# Patient Record
Sex: FEMALE | Race: WHITE | NOT HISPANIC OR LATINO | URBAN - METROPOLITAN AREA
[De-identification: names, ages, dates, MRNs, and addresses within clinical notes are randomized per-mention and may not be internally consistent; named-entity substitution may affect disease eponyms.]

---

## 2021-03-24 ENCOUNTER — INPATIENT (INPATIENT)
Facility: HOSPITAL | Age: 81
LOS: 1 days | Discharge: HOME | DRG: 251 | End: 2021-03-26
Attending: INTERNAL MEDICINE | Admitting: INTERNAL MEDICINE
Payer: MEDICARE

## 2021-03-24 VITALS
HEIGHT: 65.5 IN | RESPIRATION RATE: 18 BRPM | DIASTOLIC BLOOD PRESSURE: 82 MMHG | OXYGEN SATURATION: 100 % | HEART RATE: 72 BPM | WEIGHT: 164.91 LBS | SYSTOLIC BLOOD PRESSURE: 173 MMHG | TEMPERATURE: 99 F

## 2021-03-24 DIAGNOSIS — I20.0 UNSTABLE ANGINA: ICD-10-CM

## 2021-03-24 DIAGNOSIS — I73.9 PERIPHERAL VASCULAR DISEASE, UNSPECIFIED: ICD-10-CM

## 2021-03-24 DIAGNOSIS — I10 ESSENTIAL (PRIMARY) HYPERTENSION: ICD-10-CM

## 2021-03-24 DIAGNOSIS — R79.9 ABNORMAL FINDING OF BLOOD CHEMISTRY, UNSPECIFIED: ICD-10-CM

## 2021-03-24 DIAGNOSIS — E78.5 HYPERLIPIDEMIA, UNSPECIFIED: ICD-10-CM

## 2021-03-24 DIAGNOSIS — Z79.899 OTHER LONG TERM (CURRENT) DRUG THERAPY: ICD-10-CM

## 2021-03-24 DIAGNOSIS — I25.10 ATHEROSCLEROTIC HEART DISEASE OF NATIVE CORONARY ARTERY WITHOUT ANGINA PECTORIS: ICD-10-CM

## 2021-03-24 LAB
ALBUMIN SERPL ELPH-MCNC: 4.3 G/DL — SIGNIFICANT CHANGE UP (ref 3.3–5)
ALP SERPL-CCNC: 71 U/L — SIGNIFICANT CHANGE UP (ref 40–120)
ALT FLD-CCNC: 11 U/L — SIGNIFICANT CHANGE UP (ref 10–45)
ANION GAP SERPL CALC-SCNC: 18 MMOL/L — HIGH (ref 5–17)
APTT BLD: 32.2 SEC — SIGNIFICANT CHANGE UP (ref 27.5–35.5)
AST SERPL-CCNC: 19 U/L — SIGNIFICANT CHANGE UP (ref 10–40)
BASOPHILS # BLD AUTO: 0.03 K/UL — SIGNIFICANT CHANGE UP (ref 0–0.2)
BASOPHILS NFR BLD AUTO: 0.4 % — SIGNIFICANT CHANGE UP (ref 0–2)
BILIRUB SERPL-MCNC: 0.4 MG/DL — SIGNIFICANT CHANGE UP (ref 0.2–1.2)
BUN SERPL-MCNC: 37 MG/DL — HIGH (ref 7–23)
CALCIUM SERPL-MCNC: 10 MG/DL — SIGNIFICANT CHANGE UP (ref 8.4–10.5)
CHLORIDE SERPL-SCNC: 104 MMOL/L — SIGNIFICANT CHANGE UP (ref 96–108)
CK MB CFR SERPL CALC: 2.9 NG/ML — SIGNIFICANT CHANGE UP (ref 0–3.8)
CK SERPL-CCNC: 74 U/L — SIGNIFICANT CHANGE UP (ref 25–170)
CO2 SERPL-SCNC: 18 MMOL/L — LOW (ref 22–31)
CREAT SERPL-MCNC: 0.91 MG/DL — SIGNIFICANT CHANGE UP (ref 0.5–1.3)
EOSINOPHIL # BLD AUTO: 0.48 K/UL — SIGNIFICANT CHANGE UP (ref 0–0.5)
EOSINOPHIL NFR BLD AUTO: 5.9 % — SIGNIFICANT CHANGE UP (ref 0–6)
GLUCOSE SERPL-MCNC: 90 MG/DL — SIGNIFICANT CHANGE UP (ref 70–99)
HCT VFR BLD CALC: 38.6 % — SIGNIFICANT CHANGE UP (ref 34.5–45)
HGB BLD-MCNC: 12.4 G/DL — SIGNIFICANT CHANGE UP (ref 11.5–15.5)
IMM GRANULOCYTES NFR BLD AUTO: 0.5 % — SIGNIFICANT CHANGE UP (ref 0–1.5)
INR BLD: 0.99 RATIO — SIGNIFICANT CHANGE UP (ref 0.88–1.16)
LYMPHOCYTES # BLD AUTO: 2.34 K/UL — SIGNIFICANT CHANGE UP (ref 1–3.3)
LYMPHOCYTES # BLD AUTO: 29 % — SIGNIFICANT CHANGE UP (ref 13–44)
MCHC RBC-ENTMCNC: 30.4 PG — SIGNIFICANT CHANGE UP (ref 27–34)
MCHC RBC-ENTMCNC: 32.1 GM/DL — SIGNIFICANT CHANGE UP (ref 32–36)
MCV RBC AUTO: 94.6 FL — SIGNIFICANT CHANGE UP (ref 80–100)
MONOCYTES # BLD AUTO: 0.51 K/UL — SIGNIFICANT CHANGE UP (ref 0–0.9)
MONOCYTES NFR BLD AUTO: 6.3 % — SIGNIFICANT CHANGE UP (ref 2–14)
NEUTROPHILS # BLD AUTO: 4.67 K/UL — SIGNIFICANT CHANGE UP (ref 1.8–7.4)
NEUTROPHILS NFR BLD AUTO: 57.9 % — SIGNIFICANT CHANGE UP (ref 43–77)
NRBC # BLD: 0 /100 WBCS — SIGNIFICANT CHANGE UP (ref 0–0)
PLATELET # BLD AUTO: 330 K/UL — SIGNIFICANT CHANGE UP (ref 150–400)
POTASSIUM SERPL-MCNC: 4.4 MMOL/L — SIGNIFICANT CHANGE UP (ref 3.5–5.3)
POTASSIUM SERPL-SCNC: 4.4 MMOL/L — SIGNIFICANT CHANGE UP (ref 3.5–5.3)
PROT SERPL-MCNC: 7.4 G/DL — SIGNIFICANT CHANGE UP (ref 6–8.3)
PROTHROM AB SERPL-ACNC: 11.9 SEC — SIGNIFICANT CHANGE UP (ref 10.6–13.6)
RBC # BLD: 4.08 M/UL — SIGNIFICANT CHANGE UP (ref 3.8–5.2)
RBC # FLD: 12.7 % — SIGNIFICANT CHANGE UP (ref 10.3–14.5)
SARS-COV-2 RNA SPEC QL NAA+PROBE: SIGNIFICANT CHANGE UP
SODIUM SERPL-SCNC: 140 MMOL/L — SIGNIFICANT CHANGE UP (ref 135–145)
TROPONIN T, HIGH SENSITIVITY RESULT: 20 NG/L — SIGNIFICANT CHANGE UP (ref 0–51)
TROPONIN T, HIGH SENSITIVITY RESULT: 25 NG/L — SIGNIFICANT CHANGE UP (ref 0–51)
WBC # BLD: 8.07 K/UL — SIGNIFICANT CHANGE UP (ref 3.8–10.5)
WBC # FLD AUTO: 8.07 K/UL — SIGNIFICANT CHANGE UP (ref 3.8–10.5)

## 2021-03-24 PROCEDURE — 99285 EMERGENCY DEPT VISIT HI MDM: CPT | Mod: CS,GC

## 2021-03-24 PROCEDURE — 99223 1ST HOSP IP/OBS HIGH 75: CPT

## 2021-03-24 PROCEDURE — 71046 X-RAY EXAM CHEST 2 VIEWS: CPT | Mod: 26

## 2021-03-24 PROCEDURE — 93010 ELECTROCARDIOGRAM REPORT: CPT

## 2021-03-24 RX ORDER — METOPROLOL TARTRATE 50 MG
50 TABLET ORAL
Refills: 0 | Status: DISCONTINUED | OUTPATIENT
Start: 2021-03-24 | End: 2021-03-26

## 2021-03-24 RX ORDER — SODIUM CHLORIDE 9 MG/ML
1000 INJECTION, SOLUTION INTRAVENOUS
Refills: 0 | Status: DISCONTINUED | OUTPATIENT
Start: 2021-03-24 | End: 2021-03-26

## 2021-03-24 RX ORDER — ATORVASTATIN CALCIUM 80 MG/1
40 TABLET, FILM COATED ORAL AT BEDTIME
Refills: 0 | Status: DISCONTINUED | OUTPATIENT
Start: 2021-03-24 | End: 2021-03-26

## 2021-03-24 RX ORDER — SIMVASTATIN 20 MG/1
0 TABLET, FILM COATED ORAL
Qty: 0 | Refills: 0 | DISCHARGE

## 2021-03-24 RX ORDER — INSULIN GLARGINE 100 [IU]/ML
0 INJECTION, SOLUTION SUBCUTANEOUS
Qty: 0 | Refills: 0 | DISCHARGE

## 2021-03-24 RX ORDER — GABAPENTIN 400 MG/1
0 CAPSULE ORAL
Qty: 0 | Refills: 0 | DISCHARGE

## 2021-03-24 RX ORDER — METFORMIN HYDROCHLORIDE 850 MG/1
0 TABLET ORAL
Qty: 0 | Refills: 0 | DISCHARGE

## 2021-03-24 RX ORDER — DEXTROSE 50 % IN WATER 50 %
15 SYRINGE (ML) INTRAVENOUS ONCE
Refills: 0 | Status: DISCONTINUED | OUTPATIENT
Start: 2021-03-24 | End: 2021-03-26

## 2021-03-24 RX ORDER — HYDROCHLOROTHIAZIDE 25 MG
25 TABLET ORAL DAILY
Refills: 0 | Status: DISCONTINUED | OUTPATIENT
Start: 2021-03-24 | End: 2021-03-26

## 2021-03-24 RX ORDER — INSULIN LISPRO 100/ML
VIAL (ML) SUBCUTANEOUS
Refills: 0 | Status: DISCONTINUED | OUTPATIENT
Start: 2021-03-24 | End: 2021-03-26

## 2021-03-24 RX ORDER — CLOPIDOGREL BISULFATE 75 MG/1
75 TABLET, FILM COATED ORAL DAILY
Refills: 0 | Status: DISCONTINUED | OUTPATIENT
Start: 2021-03-24 | End: 2021-03-26

## 2021-03-24 RX ORDER — ASPIRIN/CALCIUM CARB/MAGNESIUM 324 MG
81 TABLET ORAL DAILY
Refills: 0 | Status: DISCONTINUED | OUTPATIENT
Start: 2021-03-24 | End: 2021-03-26

## 2021-03-24 RX ORDER — GLIMEPIRIDE 1 MG
0 TABLET ORAL
Qty: 0 | Refills: 0 | DISCHARGE

## 2021-03-24 RX ORDER — ENOXAPARIN SODIUM 100 MG/ML
40 INJECTION SUBCUTANEOUS DAILY
Refills: 0 | Status: DISCONTINUED | OUTPATIENT
Start: 2021-03-24 | End: 2021-03-26

## 2021-03-24 RX ORDER — GLUCAGON INJECTION, SOLUTION 0.5 MG/.1ML
1 INJECTION, SOLUTION SUBCUTANEOUS ONCE
Refills: 0 | Status: DISCONTINUED | OUTPATIENT
Start: 2021-03-24 | End: 2021-03-26

## 2021-03-24 RX ORDER — INSULIN GLARGINE 100 [IU]/ML
12 INJECTION, SOLUTION SUBCUTANEOUS AT BEDTIME
Refills: 0 | Status: DISCONTINUED | OUTPATIENT
Start: 2021-03-24 | End: 2021-03-26

## 2021-03-24 RX ORDER — DEXTROSE 50 % IN WATER 50 %
25 SYRINGE (ML) INTRAVENOUS ONCE
Refills: 0 | Status: DISCONTINUED | OUTPATIENT
Start: 2021-03-24 | End: 2021-03-26

## 2021-03-24 RX ORDER — LOSARTAN POTASSIUM 100 MG/1
100 TABLET, FILM COATED ORAL DAILY
Refills: 0 | Status: DISCONTINUED | OUTPATIENT
Start: 2021-03-24 | End: 2021-03-26

## 2021-03-24 RX ORDER — LIRAGLUTIDE 6 MG/ML
0 INJECTION SUBCUTANEOUS
Qty: 0 | Refills: 0 | DISCHARGE

## 2021-03-24 RX ADMIN — CLOPIDOGREL BISULFATE 75 MILLIGRAM(S): 75 TABLET, FILM COATED ORAL at 18:13

## 2021-03-24 RX ADMIN — Medication 50 MILLIGRAM(S): at 21:54

## 2021-03-24 RX ADMIN — INSULIN GLARGINE 12 UNIT(S): 100 INJECTION, SOLUTION SUBCUTANEOUS at 23:19

## 2021-03-24 RX ADMIN — ATORVASTATIN CALCIUM 40 MILLIGRAM(S): 80 TABLET, FILM COATED ORAL at 21:53

## 2021-03-24 NOTE — H&P ADULT - NSHPLABSRESULTS_GEN_ALL_CORE
.  LABS:                         12.4   8.07  )-----------( 330      ( 24 Mar 2021 16:07 )             38.6     03-24    140  |  104  |  37<H>  ----------------------------<  90  4.4   |  18<L>  |  0.91    Ca    10.0      24 Mar 2021 16:07    TPro  7.4  /  Alb  4.3  /  TBili  0.4  /  DBili  x   /  AST  19  /  ALT  11  /  AlkPhos  71  03-24    PT/INR - ( 24 Mar 2021 17:38 )   PT: 11.9 sec;   INR: 0.99 ratio         PTT - ( 24 Mar 2021 17:38 )  PTT:32.2 sec          RADIOLOGY, EKG & ADDITIONAL TESTS: Reviewed.

## 2021-03-24 NOTE — ED PROVIDER NOTE - OBJECTIVE STATEMENT
80 F with hx of CAD x 3 stents ( on aspirin/plavix), DM, HTN, PAD presenting for chest pressure on monday. As per patient symptoms started suddenly at rest on monday and was relieved shortly after taking tums. Patient was seen by her cardiologist today who sent her in for concern of EKG changes. Patient denies sob, fever, chills, headache, nausea, emesis, diarrhea, abdominal pain, hematuria/dysuria or lower extremity swelling. Unknown last echo.

## 2021-03-24 NOTE — H&P ADULT - ASSESSMENT
80f hx as above presenting with chest pressure which resolved on monday, possible GERD/dyspepsia, however given cardiac and PAD history, there is concern this may represent angina. Currently CP free. Also noted to have elevated anion gap and BUN

## 2021-03-24 NOTE — ED PROVIDER NOTE - ATTENDING CONTRIBUTION TO CARE
pt is a 79 y/o female with cad, 3 stents, pvd, htn dm hld presents with indigestion on monday saw dr dean today told she has usa and needs a cath, discussed already with dr hoffman, his np is here and spoke with him for admission for cath today.  no cp at this time.ekg with wellens like pattern, cardiac work up, admission.

## 2021-03-24 NOTE — ED PROVIDER NOTE - NS ED ROS FT
Constitutional: No fever or chills  Eyes: No visual changes, eye pain or redness  HEENT: No throat pain, ear pain, nasal pain. No nose bleeding.  CV: (+) chest pain. No lower extremity edema  Resp: No SOB no cough  GI: No abd pain. No nausea or vomiting. No diarrhea. No constipation.   : No dysuria, hematuria.   MSK: No musculoskeletal pain  Skin: No rash  Neuro: No headache. No numbness or tingling. No weakness.

## 2021-03-24 NOTE — H&P ADULT - PROBLEM SELECTOR PLAN 5
unclear etiology. In setting of recent presumed dyspepsia this could signal upper GIB. However patient without any other abd symptoms/bloody stool.  - continue monitoring  - send FOBT

## 2021-03-24 NOTE — H&P ADULT - HISTORY OF PRESENT ILLNESS
80f hx CAD/PAD last cardiac stent 1990's, recent RLE PAD stent on aspirin plavix, HTN, DM, presenting from PCPs office for new EKG changes. Pt states she had an episode of chest pressure on monday that self resolved after taking tums for presumed dyspepsia. No recurrent symptoms, no SOB, cough, no fevers/chills. No further abd symptoms/n/v/d. Went for routine check up today to Dr. Uribe's office, where she had EKG performed that prompted him to send her ED. Unclear what these new EKG changes were. At present, pt reports she is chest pain free, no acute symptoms. Of note, patient reports longstanding emotional stress from the loss of her  and two sons. ROS otherwise negative.    In ED: 98.9, 72, 173/82, 18, 100 RA. Seen by cardiology who recommended heparin gtt.

## 2021-03-24 NOTE — ED ADULT NURSE NOTE - OBJECTIVE STATEMENT
79yo F with hx CAD, PVD, DM, HTN . sent by pts cardiologist for noted EKG changes. pt states on monday she felt indigestion-type chest discomfort, took two tums, and pain subsided. went today to MD to follow up. hx MI x 3 stents placed years ago. pt arrives aaox4, good skin color, denies pain or complaints. no nausea diaphoresis clenching or signs of distress. pt reports compliance with daily meds including asa and plavix. CM placed. EKG is with MD Schwab

## 2021-03-24 NOTE — ED PROVIDER NOTE - PROGRESS NOTE DETAILS
Patients cardiology team noting patient to be taken to cath at this time and would like admission to their service. Will follow recommendations at this time.

## 2021-03-24 NOTE — ED PROVIDER NOTE - PLAN OF CARE
80 F with hx as above presenting for chest pressure on monday. Given hx concern for acs vs unstable angina. No signs of PNA, Pneumothorax or aortic dissection at this time. Plan for labs, EKG, cardiology.

## 2021-03-24 NOTE — ED ADULT NURSE REASSESSMENT NOTE - NS ED NURSE REASSESS COMMENT FT1
no change in pt presentation. remains free of complaints. CM in place. pending transport to possibly cath lab vs. tele floor. per cardiology team, heparin drip on hold at this time, not to be initiated

## 2021-03-24 NOTE — CONSULT NOTE ADULT - ASSESSMENT
a/p    80 F with hx of CAD x 3 stents 1997/1998 ( on aspirin/plavix), DM, HTN, PVD presenting for chest pressure on Monday.     1. Unstable Angina/ CAD x 3 stents   -symptoms resolved  -pending labs, Hema, CXR   -EKG noted with biphasic twave V1, slight ST elevation V2  -start hep gtt  -resume outpt asa, Plavix, Zocor 40 mg     2. HTN  -bp elevated  -resume outpt bp meds: Losartan 100 mg, Lopressor 50mg BID    dvt ppx     d/w ED a/p    80 F with hx of CAD x 3 stents 1997/1998 ( on aspirin/plavix), DM, HTN, PVD presenting for chest pressure on Monday.     1. Unstable Angina/ CAD x 3 stents   -symptoms resolved  -pending labs, Hema, CXR   -EKG noted with biphasic twave V1, slight ST elevation V2  -start hep gtt if hsT elevated  -resume outpt asa, Plavix, Zocor 40 mg   -plan for [facility] tm    2. HTN  -bp elevated  -resume outpt bp meds: Losartan 100 mg, Lopressor 50mg BID    dvt ppx     d/w ED

## 2021-03-24 NOTE — H&P ADULT - NSICDXPASTMEDICALHX_GEN_ALL_CORE_FT
PAST MEDICAL HISTORY:  Coronary artery disease     Hyperlipidemia     Hypertension     Peripheral artery disease

## 2021-03-24 NOTE — ED ADULT NURSE NOTE - CHPI ED NUR SYMPTOMS NEG
LM informing pt that a small supply was called in and that she should plan on scheduling an appt.  Andreina Giraldo     no back pain/no chest pain

## 2021-03-24 NOTE — ED PROVIDER NOTE - PHYSICAL EXAMINATION
· Physical Examination: PHYSICAL EXAM:   · CONSTITUTIONAL:  Appearance: well appearing.  Development: well developed.  Distress: no apparent  · Manner: appropriate for situation.  Mentation: awake, alert, oriented to person, place, time/situation  · Mood: appropriate.  Nourishment: well  · Head Shape: normal cephalic, ATRAUMATIC  · EYES: bilateral normal, no discharge, redness or evidence of any abnormality  · Nose: clear Mouth: normal mucosa  · Throat: uvula midline, no vesicles, no redness, and no oropharyngeal exudate.  · CARDIAC:  CARDIAC RHYTHM: regular  CARDIAC RATE: normal  CARDIAC PEDAL EDEMA: absent  CARDIAC JVD: non-distended bilaterally  · CARDIAC PULSES: normal bilaterally  · RESPIRATORY:  Respiratory Distress: no  Breath Sounds: normal  · Chest Exam: normal, non-tender  · Abdominal Exam: soft, nondistended, nontender  · MUSCULOSKELETAL: Spine appears normal, range of motion is not limited, no muscle or joint tenderness  · NEUROLOGICAL: Alert and oriented, no focal deficits, no motor or sensory deficits.  · SKIN: Skin normal color for race, warm, dry and intact. No evidence of rash.  · PSYCHIATRIC: Alert and oriented to person, place, time/situation. normal mood and affect. no apparent risk to self or others.

## 2021-03-24 NOTE — H&P ADULT - PROBLEM SELECTOR PLAN 1
s/p PCI, last in 1990's, currently asymptomatic. EKG with biphasic T waves and possible V2 ST elevation  - serial EKGs to track dynamic changes  - initial troponin indeterminate, continue trending  - discussed with Dr. Alcaraz- hold off on heparin unless tropes/EKG turn positive or recurrence of chest pain  - continue aspirin plavix, lopressor 50mg po bid  - cardiology following s/p PCI, last in 1990's, currently asymptomatic. EKG with biphasic T waves and possible V2 ST elevation  - serial EKGs to track dynamic changes  - initial troponin indeterminate, continue trending  - discussed with Dr. Alcaraz- hold off on heparin unless tropes/EKG turn positive or recurrence of chest pain  - continue aspirin plavix, lopressor 50mg po tid  - cardiology following- plan for [facility] tomorrow

## 2021-03-24 NOTE — ED ADULT NURSE REASSESSMENT NOTE - NS ED NURSE REASSESS COMMENT FT1
Report received from MONIQUE Romero. Pt AAOx4, NAD, resp nonlabored, skin warm/dry, resting comfortably in bed with call bell at bedside. Pt denies dizziness, chest pain, palpitations, SOB, abd pain, n/v/d, urinary symptoms, fevers, chills, weakness at this time. Pt awaiting bed/dispo. Safety maintained.

## 2021-03-24 NOTE — ED ADULT NURSE REASSESSMENT NOTE - NS ED NURSE REASSESS COMMENT FT1
MD team spoke with pts cardiologist who is recommending possible need to go to cath lab today. pt is aware and ok with possible plan

## 2021-03-24 NOTE — ED PROVIDER NOTE - CARE PLAN
Principal Discharge DX:	Unstable angina   Principal Discharge DX:	Unstable angina  Assessment and plan of treatment:	80 F with hx as above presenting for chest pressure on monday. Given hx concern for acs vs unstable angina. No signs of PNA, Pneumothorax or aortic dissection at this time. Plan for labs, EKG, cardiology.

## 2021-03-24 NOTE — H&P ADULT - PROBLEM SELECTOR PLAN 2
continue lopressor, losartan/hctz with hold parameters continue lopressor, losartan/hctz with hold parameters    #DM- hold oral meds while inpatient  - lantus 12 units qhs, sliding scale for now

## 2021-03-25 ENCOUNTER — TRANSCRIPTION ENCOUNTER (OUTPATIENT)
Age: 81
End: 2021-03-25

## 2021-03-25 LAB
A1C WITH ESTIMATED AVERAGE GLUCOSE RESULT: 7.2 % — HIGH (ref 4–5.6)
CHOLEST SERPL-MCNC: 118 MG/DL — SIGNIFICANT CHANGE UP
COVID-19 SPIKE DOMAIN AB INTERP: POSITIVE
COVID-19 SPIKE DOMAIN ANTIBODY RESULT: >250 U/ML — HIGH
ESTIMATED AVERAGE GLUCOSE: 160 MG/DL — HIGH (ref 68–114)
HDLC SERPL-MCNC: 29 MG/DL — LOW
LIPID PNL WITH DIRECT LDL SERPL: 69 MG/DL — SIGNIFICANT CHANGE UP
NON HDL CHOLESTEROL: 89 MG/DL — SIGNIFICANT CHANGE UP
SARS-COV-2 IGG+IGM SERPL QL IA: >250 U/ML — HIGH
SARS-COV-2 IGG+IGM SERPL QL IA: POSITIVE
TRIGL SERPL-MCNC: 98 MG/DL — SIGNIFICANT CHANGE UP

## 2021-03-25 PROCEDURE — 92920 PRQ TRLUML C ANGIOP 1ART&/BR: CPT | Mod: RC

## 2021-03-25 PROCEDURE — 93454 CORONARY ARTERY ANGIO S&I: CPT | Mod: 26,59

## 2021-03-25 PROCEDURE — 93010 ELECTROCARDIOGRAM REPORT: CPT

## 2021-03-25 RX ADMIN — Medication 25 MILLIGRAM(S): at 06:19

## 2021-03-25 RX ADMIN — CLOPIDOGREL BISULFATE 75 MILLIGRAM(S): 75 TABLET, FILM COATED ORAL at 06:18

## 2021-03-25 RX ADMIN — Medication 50 MILLIGRAM(S): at 06:19

## 2021-03-25 RX ADMIN — Medication 1: at 16:52

## 2021-03-25 RX ADMIN — LOSARTAN POTASSIUM 100 MILLIGRAM(S): 100 TABLET, FILM COATED ORAL at 06:19

## 2021-03-25 RX ADMIN — Medication 81 MILLIGRAM(S): at 06:19

## 2021-03-25 RX ADMIN — Medication 3: at 11:23

## 2021-03-25 RX ADMIN — INSULIN GLARGINE 12 UNIT(S): 100 INJECTION, SOLUTION SUBCUTANEOUS at 21:15

## 2021-03-25 RX ADMIN — ATORVASTATIN CALCIUM 40 MILLIGRAM(S): 80 TABLET, FILM COATED ORAL at 21:16

## 2021-03-25 RX ADMIN — Medication 50 MILLIGRAM(S): at 17:15

## 2021-03-25 NOTE — DISCHARGE NOTE PROVIDER - NSDCCPTREATMENT_GEN_ALL_CORE_FT
PRINCIPAL PROCEDURE  Procedure: Left heart cardiac cath  Findings and Treatment: s/p PCI/POBA to pCX 90% via RFA       PRINCIPAL PROCEDURE  Procedure: Left heart cardiac cath  Findings and Treatment: INDICATIONS: Other chest pain.  HISTORY: 80f hx CAD/PAD last cardiac stent 1990's, recent RLE PAD stent on aspirin plavix, HTN, DM presenting with chest pain, and posible NST. The patient has a history of coronary artery disease. There were no  significant risk factors.  PROCEDURE:  --  Left coronary angiography.  --  Right coronary angiography.  --  Sonosite - Diagnostic.  --  Sheath Exchange for Intervention.  --  Intervention on mid RCA: balloon angioplasty.  CORONARY VESSELS: The coronary circulation is right dominant.  LM:   --  LM: Angiography showed minor luminal irregularities with no flow  limiting lesions.  LAD:   --  LAD: Angiography showed mild atherosclerosis with no flow  limiting lesions.  CX:   --  Circumflex: Angiography showed mild atherosclerosis with no flow limiting lesions.  RCA:   --  Mid RCA: There was a 80 % stenosis.  COMPLICATIONS: There were no complications.  DIAGNOSTIC RECOMMENDATIONS: Successful angioplasty to mRCA severe ISR, with adequate expansion and FILI 3 flow. IVUS revealing multiple stent layers at site of lesion. Recommend brachytherapy in 3-4 weeks by Dr. Green.  Plan relayed to patient and patient's cardiologist.  INTERVENTIONAL RECOMMENDATIONS: Successful angioplasty to mRCA severe ISR,  with adequate expansion and FILI 3 flow. IVUS revealing multiple stent layers at site of lesion. Recommend brachytherapy in 3-4 weeks by Dr. Green. Plan relayed to patient and patient's cardiologist.

## 2021-03-25 NOTE — DISCHARGE NOTE PROVIDER - HOSPITAL COURSE
80f hx CAD/PAD last cardiac stent 1990's, recent RLE PAD stent on aspirin plavix, HTN, DM, presenting from PCPs office for new EKG changes. Pt states she had an episode of chest pressure on monday that self resolved after taking tums for presumed dyspepsia. No recurrent symptoms, no SOB, cough, no fevers/chills. No further abd symptoms/n/v/d. Went for routine check up today to Dr. Uribe's office, where she had EKG performed that prompted him to send her ED. Unclear what these new EKG changes were. At present, pt reports she is chest pain free, no acute symptoms. Of note, patient reports longstanding emotional stress from the loss of her  and two sons. ROS otherwise negative.  3/25 s/p C PCI/POBA to pCx (90%) via RFA, no hematoma, positive pulses. Patient without complaint, hemodynamically stable. Pending discharge tomorrow

## 2021-03-25 NOTE — PROGRESS NOTE ADULT - ASSESSMENT
a/p  80 F with hx of CAD x 3 stents 1997/1998 ( on aspirin/plavix), DM, HTN, PVD presenting for chest pressure on Monday.     1. Unstable Angina/ CAD x 3 stents   -no further cp  -mild dizziness - orthostatics neg  -EKG noted with biphasic twave V1, slight ST elevation V2  -HsT indeterminate, ck, ckmb nl   -c/w asa, plavix, statin, bb   -plan for C tpday   -Risks/benefits/alternatives of cardiac catheterization discussed with patient at length.  All questions and concerns were addressed. Patient agrees to proceed.     2. HTN  -bp improved   -c/w current meds     dvt ppx

## 2021-03-25 NOTE — DISCHARGE NOTE PROVIDER - CARE PROVIDER_API CALL
Leonardo Alcaraz)  Cardiology  [address], [address]  [place] [address]  Phone: [phone]  Fax: [phone]  Established Patient  Follow Up Time: 2 weeks   Kimani Uribe  CARDIOVASCULAR DISEASE  [address], [address]  [place] [address]  Phone: [phone]  Fax: [phone]  Follow Up Time: 2 weeks

## 2021-03-25 NOTE — DISCHARGE NOTE PROVIDER - NSDCFUADDINST_GEN_ALL_CORE_FT
Wound Care:   the day AFTER your procedure remove bandage GENTLY, and clean using  mild soap and gentle warm, water stream, pat dry. leave OPEN to air. YOU MAY SHOWER   DO NOT apply lotions, creams, ointments, powder, perfumes to your incision site  DO NOT SOAK your site for 1 week ( no baths, no pools, no tubs, etc...)  Check  your groin daily. A small amount of bruising, and soreness are normal    ACTIVITY: for 24 hours   - DO NOT DRIVE  - DO NOT make any important decisions or sign legal documents   - DO NOT operate heavy machineries  - you may resume sexual activity in 48 hours, unless otherwise instructed by your cardiologist     If your procedure was done through the WRIST: for the NEXT 3DAYS:  - avoid pushing, pulling, with that affected wrist   - avoid repeated movement of that hand and wrist (eg: typing, hammering)  - DO NOT LIFT anything more than 5 lbs     If your procedure was done through the GROIN: for the NEXT 5 DAYS  - Limit climbing stairs, DO NOT soak in bathtub or pool  - no strenuous activities, pushing, pulling, straining  - Do not lift anything 10lbs or heavier     MEDICATION:   take your medications as explained (see discharge paperwork)   If you received a STENT, you will be taking antiplatelet medications to KEEP YOUR STENT OPEN ( eg: Aspirin, Plavix, Brilinta, Effient, etc).  Take as prescribed DO NOT STOP taking them without consulting with your cardiologist first.     Follow heart healthy diet recommended by your doctor, , if you smoke STOP SMOKING ( may call [phone] for center of tobacco control if you need assistance)     CALL your doctor to make appointment in 2 WEEKS     ***CALL YOUR DOCTOR***  if you experience: fever, chills, body aches, or severe pain, swelling, redness, heat or yellow discharge at incision site  If you experience Bleeding or excruciating pain at the procedural site, swelling ( golf ball size) at your procedural site  If you experience CHEST PAIN  If you experience extremity numbness, tingling, temperature change ( of your procedural site)   If you are unable to reach your doctor, you may contact:   -Cardiology Office at [facility] at [phone] or   - [facility] [phone]  - [facility] [phone]

## 2021-03-25 NOTE — DISCHARGE NOTE PROVIDER - PROVIDER TOKENS
PROVIDER:[TOKEN:[[id number]],FOLLOWUP:[2 weeks],ESTABLISHEDPATIENT:[T]] PROVIDER:[TOKEN:[[id number]],FOLLOWUP:[2 weeks]]

## 2021-03-25 NOTE — PROGRESS NOTE ADULT - SUBJECTIVE AND OBJECTIVE BOX
CARDIOLOGY FOLLOW UP - Dr. Alcaraz    CC: no further cp. no sob.  reports mild dizziness, orthostatics neg       PHYSICAL EXAM:  T(C): 36.7 (03-25-21 @ 09:25), Max: 37.2 (03-24-21 @ 15:18)  HR: 63 (03-25-21 @ 09:25) (63 - 78)  BP: 139/49 (03-25-21 @ 09:25) (104/60 - 173/82)  RR: 16 (03-25-21 @ 09:25) (14 - 19)  SpO2: 100% (03-25-21 @ 09:25) (96% - 100%)  Wt(kg): --  I&O's Summary    25 Mar 2021 07:01  -  25 Mar 2021 10:48  --------------------------------------------------------  IN: 0 mL / OUT: 0 mL / NET: 0 mL        Appearance: Normal	  Cardiovascular: Normal S1 S2,RRR, No JVD, No murmurs  Respiratory: Lungs clear to auscultation	  Gastrointestinal:  Soft, Non-tender, + BS	  Extremities: Normal range of motion, No clubbing, cyanosis or edema      Home Medications:  aspirin 81 mg oral tablet: 1 tab(s) orally once a day (24 Mar 2021 18:17)  glimepiride 4 mg oral tablet: 1 tab(s) orally once a day (24 Mar 2021 18:17)  losartan-hydrochlorothiazide 100 mg-25 mg oral tablet: 1 tab(s) orally once a day (24 Mar 2021 18:17)  metFORMIN 1000 mg oral tablet: 1 tab(s) orally 2 times a day (24 Mar 2021 18:17)  metoprolol tartrate 50 mg oral tablet: 1 tab(s) orally 3 times a day (24 Mar 2021 18:17)  Plavix 75 mg oral tablet: 1 tab(s) orally once a day (24 Mar 2021 18:17)  simvastatin: 40 milligram(s) orally once a day (24 Mar 2021 18:17)      MEDICATIONS  (STANDING):  aspirin enteric coated 81 milliGRAM(s) Oral daily  atorvastatin 40 milliGRAM(s) Oral at bedtime  clopidogrel Tablet 75 milliGRAM(s) Oral daily  dextrose 40% Gel 15 Gram(s) Oral once  dextrose 5%. 1000 milliLiter(s) (50 mL/Hr) IV Continuous <Continuous>  dextrose 50% Injectable 25 Gram(s) IV Push once  enoxaparin Injectable 40 milliGRAM(s) SubCutaneous daily  glucagon  Injectable 1 milliGRAM(s) IntraMuscular once  hydrochlorothiazide 25 milliGRAM(s) Oral daily  insulin glargine Injectable (LANTUS) 12 Unit(s) SubCutaneous at bedtime  insulin lispro (ADMELOG) corrective regimen sliding scale   SubCutaneous three times a day before meals  losartan 100 milliGRAM(s) Oral daily  metoprolol tartrate 50 milliGRAM(s) Oral two times a day      TELEMETRY: NSR 	    ECG:  	  RADIOLOGY:  Xray Chest 2 Views PA/Lat (03.24.21 @ 17:07)   FINDINGS:    The lungs are clear. No pleural effusion or pneumothorax.  The heart appears normal in size. There is calcification of the aorta.  Status post reverse total right shoulder arthroplasty. Surgical clips are noted in the neck.  The visualized portions of the abdomen are unremarkable.    IMPRESSION:    Clear lungs.  DIAGNOSTIC TESTING:  [ ] Echocardiogram:  [ ]  Catheterization:  [ ] Stress Test:    OTHER: 	    LABS:	 	    Creatine Kinase, Serum: 74 U/L [25 - 170] (03-24 @ 22:00)  CKMB Units: 2.9 ng/mL [0.0 - 3.8] (03-24 @ 22:00)  Troponin T, High Sensitivity Result: 20 ng/L [0 - 51] (03-24 @ 22:00)  Troponin T, High Sensitivity Result: 25 ng/L [0 - 51] (03-24 @ 16:07)                          12.4   8.07  )-----------( 330      ( 24 Mar 2021 16:07 )             38.6     03-24    140  |  104  |  37<H>  ----------------------------<  90  4.4   |  18<L>  |  0.91    Ca    10.0      24 Mar 2021 16:07    TPro  7.4  /  Alb  4.3  /  TBili  0.4  /  DBili  x   /  AST  19  /  ALT  11  /  AlkPhos  71  03-24    PT/INR - ( 24 Mar 2021 17:38 )   PT: 11.9 sec;   INR: 0.99 ratio         PTT - ( 24 Mar 2021 17:38 )  PTT:32.2 sec

## 2021-03-25 NOTE — DISCHARGE NOTE PROVIDER - NSDCMRMEDTOKEN_GEN_ALL_CORE_FT
aspirin 81 mg oral tablet: 1 tab(s) orally once a day  glimepiride 4 mg oral tablet: 1 tab(s) orally once a day  losartan-hydrochlorothiazide 100 mg-25 mg oral tablet: 1 tab(s) orally once a day  metFORMIN 1000 mg oral tablet: 1 tab(s) orally 2 times a day  metoprolol tartrate 50 mg oral tablet: 1 tab(s) orally 3 times a day  Plavix 75 mg oral tablet: 1 tab(s) orally once a day  simvastatin: 40 milligram(s) orally once a day   aspirin 81 mg oral tablet: 1 tab(s) orally once a day  glimepiride 4 mg oral tablet: 1 tab(s) orally once a day  losartan-hydrochlorothiazide 100 mg-25 mg oral tablet: 1 tab(s) orally once a day  metFORMIN 1000 mg oral tablet: 1 tab(s) orally 2 times a day  HOLD  restart on 3/28/2021  metoprolol tartrate 50 mg oral tablet: 1 tab(s) orally 3 times a day  Plavix 75 mg oral tablet: 1 tab(s) orally once a day MDD:1  simvastatin: 40 milligram(s) orally once a day

## 2021-03-26 ENCOUNTER — TRANSCRIPTION ENCOUNTER (OUTPATIENT)
Age: 81
End: 2021-03-26

## 2021-03-26 VITALS
TEMPERATURE: 98 F | RESPIRATION RATE: 16 BRPM | DIASTOLIC BLOOD PRESSURE: 59 MMHG | HEART RATE: 63 BPM | OXYGEN SATURATION: 99 % | SYSTOLIC BLOOD PRESSURE: 111 MMHG

## 2021-03-26 LAB
ANION GAP SERPL CALC-SCNC: 13 MMOL/L — SIGNIFICANT CHANGE UP (ref 5–17)
BASOPHILS # BLD AUTO: 0.04 K/UL — SIGNIFICANT CHANGE UP (ref 0–0.2)
BASOPHILS NFR BLD AUTO: 0.5 % — SIGNIFICANT CHANGE UP (ref 0–2)
BUN SERPL-MCNC: 28 MG/DL — HIGH (ref 7–23)
CALCIUM SERPL-MCNC: 9.6 MG/DL — SIGNIFICANT CHANGE UP (ref 8.4–10.5)
CHLORIDE SERPL-SCNC: 105 MMOL/L — SIGNIFICANT CHANGE UP (ref 96–108)
CO2 SERPL-SCNC: 23 MMOL/L — SIGNIFICANT CHANGE UP (ref 22–31)
CREAT SERPL-MCNC: 1.06 MG/DL — SIGNIFICANT CHANGE UP (ref 0.5–1.3)
EOSINOPHIL # BLD AUTO: 0.37 K/UL — SIGNIFICANT CHANGE UP (ref 0–0.5)
EOSINOPHIL NFR BLD AUTO: 4.3 % — SIGNIFICANT CHANGE UP (ref 0–6)
GLUCOSE SERPL-MCNC: 146 MG/DL — HIGH (ref 70–99)
HCT VFR BLD CALC: 38.2 % — SIGNIFICANT CHANGE UP (ref 34.5–45)
HGB BLD-MCNC: 12.4 G/DL — SIGNIFICANT CHANGE UP (ref 11.5–15.5)
IMM GRANULOCYTES NFR BLD AUTO: 0.2 % — SIGNIFICANT CHANGE UP (ref 0–1.5)
LYMPHOCYTES # BLD AUTO: 2.04 K/UL — SIGNIFICANT CHANGE UP (ref 1–3.3)
LYMPHOCYTES # BLD AUTO: 23.9 % — SIGNIFICANT CHANGE UP (ref 13–44)
MCHC RBC-ENTMCNC: 30.9 PG — SIGNIFICANT CHANGE UP (ref 27–34)
MCHC RBC-ENTMCNC: 32.5 GM/DL — SIGNIFICANT CHANGE UP (ref 32–36)
MCV RBC AUTO: 95.3 FL — SIGNIFICANT CHANGE UP (ref 80–100)
MONOCYTES # BLD AUTO: 0.57 K/UL — SIGNIFICANT CHANGE UP (ref 0–0.9)
MONOCYTES NFR BLD AUTO: 6.7 % — SIGNIFICANT CHANGE UP (ref 2–14)
NEUTROPHILS # BLD AUTO: 5.51 K/UL — SIGNIFICANT CHANGE UP (ref 1.8–7.4)
NEUTROPHILS NFR BLD AUTO: 64.4 % — SIGNIFICANT CHANGE UP (ref 43–77)
NRBC # BLD: 0 /100 WBCS — SIGNIFICANT CHANGE UP (ref 0–0)
PLATELET # BLD AUTO: 324 K/UL — SIGNIFICANT CHANGE UP (ref 150–400)
POTASSIUM SERPL-MCNC: 3.7 MMOL/L — SIGNIFICANT CHANGE UP (ref 3.5–5.3)
POTASSIUM SERPL-SCNC: 3.7 MMOL/L — SIGNIFICANT CHANGE UP (ref 3.5–5.3)
RBC # BLD: 4.01 M/UL — SIGNIFICANT CHANGE UP (ref 3.8–5.2)
RBC # FLD: 12.8 % — SIGNIFICANT CHANGE UP (ref 10.3–14.5)
SODIUM SERPL-SCNC: 141 MMOL/L — SIGNIFICANT CHANGE UP (ref 135–145)
WBC # BLD: 8.55 K/UL — SIGNIFICANT CHANGE UP (ref 3.8–10.5)
WBC # FLD AUTO: 8.55 K/UL — SIGNIFICANT CHANGE UP (ref 3.8–10.5)

## 2021-03-26 PROCEDURE — C1887: CPT

## 2021-03-26 PROCEDURE — C1894: CPT

## 2021-03-26 PROCEDURE — 71046 X-RAY EXAM CHEST 2 VIEWS: CPT

## 2021-03-26 PROCEDURE — 82550 ASSAY OF CK (CPK): CPT

## 2021-03-26 PROCEDURE — 80061 LIPID PANEL: CPT

## 2021-03-26 PROCEDURE — 80048 BASIC METABOLIC PNL TOTAL CA: CPT

## 2021-03-26 PROCEDURE — C1725: CPT

## 2021-03-26 PROCEDURE — 86769 SARS-COV-2 COVID-19 ANTIBODY: CPT

## 2021-03-26 PROCEDURE — 93454 CORONARY ARTERY ANGIO S&I: CPT | Mod: 59

## 2021-03-26 PROCEDURE — 85730 THROMBOPLASTIN TIME PARTIAL: CPT

## 2021-03-26 PROCEDURE — 84484 ASSAY OF TROPONIN QUANT: CPT

## 2021-03-26 PROCEDURE — 85610 PROTHROMBIN TIME: CPT

## 2021-03-26 PROCEDURE — 83036 HEMOGLOBIN GLYCOSYLATED A1C: CPT

## 2021-03-26 PROCEDURE — 82962 GLUCOSE BLOOD TEST: CPT

## 2021-03-26 PROCEDURE — 80053 COMPREHEN METABOLIC PANEL: CPT

## 2021-03-26 PROCEDURE — 99285 EMERGENCY DEPT VISIT HI MDM: CPT

## 2021-03-26 PROCEDURE — 82553 CREATINE MB FRACTION: CPT

## 2021-03-26 PROCEDURE — C1753: CPT

## 2021-03-26 PROCEDURE — C1769: CPT

## 2021-03-26 PROCEDURE — 92920 PRQ TRLUML C ANGIOP 1ART&/BR: CPT | Mod: RC

## 2021-03-26 PROCEDURE — 93005 ELECTROCARDIOGRAM TRACING: CPT

## 2021-03-26 PROCEDURE — 85025 COMPLETE CBC W/AUTO DIFF WBC: CPT

## 2021-03-26 PROCEDURE — U0003: CPT

## 2021-03-26 RX ORDER — CLOPIDOGREL BISULFATE 75 MG/1
1 TABLET, FILM COATED ORAL
Qty: 90 | Refills: 3
Start: 2021-03-26

## 2021-03-26 RX ORDER — POTASSIUM CHLORIDE 20 MEQ
40 PACKET (EA) ORAL DAILY
Refills: 0 | Status: COMPLETED | OUTPATIENT
Start: 2021-03-26 | End: 2021-03-26

## 2021-03-26 RX ORDER — CLOPIDOGREL BISULFATE 75 MG/1
1 TABLET, FILM COATED ORAL
Qty: 0 | Refills: 0 | DISCHARGE

## 2021-03-26 RX ORDER — METFORMIN HYDROCHLORIDE 850 MG/1
1 TABLET ORAL
Qty: 0 | Refills: 0 | DISCHARGE

## 2021-03-26 RX ADMIN — Medication 81 MILLIGRAM(S): at 06:04

## 2021-03-26 RX ADMIN — Medication 1: at 07:48

## 2021-03-26 RX ADMIN — Medication 25 MILLIGRAM(S): at 06:04

## 2021-03-26 RX ADMIN — Medication 50 MILLIGRAM(S): at 06:04

## 2021-03-26 RX ADMIN — Medication 40 MILLIEQUIVALENT(S): at 11:58

## 2021-03-26 RX ADMIN — Medication 2: at 11:58

## 2021-03-26 RX ADMIN — LOSARTAN POTASSIUM 100 MILLIGRAM(S): 100 TABLET, FILM COATED ORAL at 06:04

## 2021-03-26 RX ADMIN — CLOPIDOGREL BISULFATE 75 MILLIGRAM(S): 75 TABLET, FILM COATED ORAL at 06:04

## 2021-03-26 NOTE — PROGRESS NOTE ADULT - ASSESSMENT
[facility] 3/25/21: PCI/POBA to pCX 90%     a/p  80 F with hx of CAD x 3 stents 1997/1998 ( on aspirin/plavix), DM, HTN, PVD presenting for chest pressure on Monday.     1. Unstable Angina/ CAD x 3 stents   -no further cp  -mild dizziness - orthostatics neg  -EKG noted with biphasic twave V1, slight ST elevation V2  -HsT indeterminate, ck, ckmb nl   -[facility] with PCI/POBA to pCX 90%   -plan for brachytherapy in a few weeks   -c/w asa, plavix, statin, bb     2. HTN  -bp stable  -c/w current meds     dvt ppx     DCP pt to f/u w Dr. Uribe upon dc

## 2021-03-26 NOTE — PROGRESS NOTE ADULT - TIME BILLING
Agree with above NP note.  cv stable  s/p cath with POBA to severe RCA instent restenosis  cont dap  d/c planning for tomorrow   will plan for brachytherapy next month
Agree with above NP note.  cv stable  s/p cath with POBA to severe RCA instent restenosis  cont dap  d/c planning   will plan for brachytherapy next month

## 2021-03-26 NOTE — CONSULT NOTE ADULT - ASSESSMENT
80f hx as above presenting with chest pressure which resolved on monday, possible GERD/dyspepsia, however given cardiac and PAD history, there is concern this may represent angina. Currently CP free. Also noted to have elevated anion gap and BUN    Coronary artery disease.      Unstable Angina/ CAD x 3 stents   -no further cp  -mild dizziness - orthostatics neg  -EKG noted with biphasic twave V1, slight ST elevation V2  -HsT indeterminate, ck, ckmb nl   -[facility] with PCI/POBA to pCX 90%   -plan for brachytherapy in a few weeks   -c/w asa, plavix, statin, bb   - cardiology following-      Hypertension.    continue lopressor, losartan/hctz with hold parameters    DM- hold oral meds while inpatient  - lantus 12 units qhs, sliding scale for now.     Peripheral artery disease.     s/p recent RLE stent  - cont aspirin, plavix for now.      Hyperlipidemia.    atorvastatin 40mg po qhs per therapeutic interchange.     Elevated BUN.    unclear etiology. In setting of recent presumed dyspepsia this could signal upper GIB. However patient without any other abd symptoms/bloody stool.  - continue monitoring  - send FOBT.      Medication management.   verified meds with [facility].

## 2021-03-26 NOTE — DISCHARGE NOTE NURSING/CASE MANAGEMENT/SOCIAL WORK - PATIENT PORTAL LINK FT
You can access the FollowMyHealth Patient Portal offered by [facility] by registering at the following website: [email or web address]. By joining [employer]’s FollowMyHealth portal, you will also be able to view your health information using other applications (apps) compatible with our system.

## 2021-03-26 NOTE — CONSULT NOTE ADULT - SUBJECTIVE AND OBJECTIVE BOX
80f hx CAD/PAD last cardiac stent 1990's, recent RLE PAD stent on aspirin plavix, HTN, DM, presenting from PCPs office for new EKG changes. Pt states she had an episode of chest pressure on monday that self resolved after taking tums for presumed dyspepsia. No recurrent symptoms, no SOB, cough, no fevers/chills. No further abd symptoms/n/v/d. Went for routine check up today to Dr. Uribe's office, where she had EKG performed that prompted him to send her ED. Unclear what these new EKG changes were. At present, pt reports she is chest pain free, no acute symptoms. Of note, patient reports longstanding emotional stress from the loss of her  and two sons. ROS otherwise negative.    In ED: 98.9, 72, 173/82, 18, 100 RA. Seen by cardiology who recommended heparin gtt. (24 Mar 2021 17:53)      PAST MEDICAL & SURGICAL HISTORY:  Peripheral artery disease    Hyperlipidemia    Coronary artery disease    Hypertension        Review of Systems:   CONSTITUTIONAL: No fever, weight loss, or fatigue  EYES: No eye pain, visual disturbances, or discharge  ENMT:  No difficulty hearing, tinnitus, vertigo; No sinus or throat pain  NECK: No pain or stiffness  BREASTS: No pain, masses, or nipple discharge  RESPIRATORY: No cough, wheezing, chills or hemoptysis; No shortness of breath  CARDIOVASCULAR: as above  GASTROINTESTINAL: No abdominal or epigastric pain. No nausea, vomiting, or hematemesis; No diarrhea or constipation. No melena or hematochezia.  GENITOURINARY: No dysuria, frequency, hematuria, or incontinence  NEUROLOGICAL: No headaches, memory loss, loss of strength, numbness, or tremors  SKIN: No itching, burning, rashes, or lesions   LYMPH NODES: No enlarged glands  ENDOCRINE: No heat or cold intolerance; No hair loss  MUSCULOSKELETAL: No joint pain or swelling; No muscle, back, or extremity pain  PSYCHIATRIC: No depression, anxiety, mood swings, or difficulty sleeping  HEME/LYMPH: No easy bruising, or bleeding gums  ALLERY AND IMMUNOLOGIC: No hives or eczema    Allergies    No Known Allergies    Intolerances        Social History:     FAMILY HISTORY:      MEDICATIONS  (STANDING):  aspirin enteric coated 81 milliGRAM(s) Oral daily  atorvastatin 40 milliGRAM(s) Oral at bedtime  clopidogrel Tablet 75 milliGRAM(s) Oral daily  dextrose 40% Gel 15 Gram(s) Oral once  dextrose 5%. 1000 milliLiter(s) (50 mL/Hr) IV Continuous <Continuous>  dextrose 50% Injectable 25 Gram(s) IV Push once  enoxaparin Injectable 40 milliGRAM(s) SubCutaneous daily  glucagon  Injectable 1 milliGRAM(s) IntraMuscular once  hydrochlorothiazide 25 milliGRAM(s) Oral daily  insulin glargine Injectable (LANTUS) 12 Unit(s) SubCutaneous at bedtime  insulin lispro (ADMELOG) corrective regimen sliding scale   SubCutaneous three times a day before meals  losartan 100 milliGRAM(s) Oral daily  metoprolol tartrate 50 milliGRAM(s) Oral two times a day    MEDICATIONS  (PRN):      Vital Signs Last 24 Hrs  T(C): 36.9 (26 Mar 2021 09:03), Max: 36.9 (26 Mar 2021 04:54)  T(F): 98.5 (26 Mar 2021 09:03), Max: 98.5 (26 Mar 2021 04:54)  HR: 63 (26 Mar 2021 09:03) (63 - 79)  BP: 111/59 (26 Mar 2021 09:03) (106/47 - 133/68)  BP(mean): --  RR: 16 (26 Mar 2021 09:03) (16 - 18)  SpO2: 99% (26 Mar 2021 09:03) (96% - 99%)  CAPILLARY BLOOD GLUCOSE      POCT Blood Glucose.: 204 mg/dL (26 Mar 2021 11:16)  POCT Blood Glucose.: 164 mg/dL (26 Mar 2021 07:05)  POCT Blood Glucose.: 212 mg/dL (25 Mar 2021 21:13)  POCT Blood Glucose.: 183 mg/dL (25 Mar 2021 16:39)    I&O's Summary    25 Mar 2021 07:01  -  26 Mar 2021 07:00  --------------------------------------------------------  IN: 0 mL / OUT: 0 mL / NET: 0 mL    26 Mar 2021 07:01  -  26 Mar 2021 16:00  --------------------------------------------------------  IN: 480 mL / OUT: 0 mL / NET: 480 mL        PHYSICAL EXAM:  GENERAL: NAD, well-developed  HEAD:  Atraumatic, Normocephalic  EYES: EOMI, PERRLA, conjunctiva and sclera clear  NECK: Supple, No JVD  CHEST/LUNG: Clear to auscultation bilaterally; No wheeze  HEART: Regular rate and rhythm; No murmurs, rubs, or gallops  ABDOMEN: Soft, Nontender, Nondistended; Bowel sounds present  EXTREMITIES:  2+ Peripheral Pulses, No clubbing, cyanosis, or edema  PSYCH: AAOx3  NEUROLOGY: non-focal  SKIN: No rashes or lesions    LABS:                        12.4   8.55  )-----------( 324      ( 26 Mar 2021 01:56 )             38.2     03-26    141  |  105  |  28<H>  ----------------------------<  146<H>  3.7   |  23  |  1.06    Ca    9.6      26 Mar 2021 01:56    TPro  7.4  /  Alb  4.3  /  TBili  0.4  /  DBili  x   /  AST  19  /  ALT  11  /  AlkPhos  71  03-24    PT/INR - ( 24 Mar 2021 17:38 )   PT: 11.9 sec;   INR: 0.99 ratio         PTT - ( 24 Mar 2021 17:38 )  PTT:32.2 sec  CARDIAC MARKERS ( 24 Mar 2021 22:00 )  x     / x     / 74 U/L / x     / 2.9 ng/mL          RADIOLOGY & ADDITIONAL TESTS:    Imaging Personally Reviewed:    Consultant(s) Notes Reviewed:      Care Discussed with Consultants/Other Providers:  
CARDIOLOGY CONSULT - Dr. Alcaraz         HPI:    80 F with hx of CAD x 3 stents 1997/1998 ( on aspirin/plavix), DM, HTN, PVD presenting for chest pressure on Monday.  As per patient symptoms started suddenly at rest on Monday, thought it was indigestion and took Tums with minimal relief.  Reports pain lasted for hours accompanied with mild dizziness and overall malaise.  Symptoms have resolved over the past 24-48 hours.  Patient was seen by her cardiologist ([facility] Dr. Uribe) today who sent her in for concern of EKG changes. Patient denies sob, fever, chills, headache, nausea, emesis, diarrhea, abdominal pain, hematuria/dysuria or lower extremity swelling. Denies any recent cardiac testing.        PAST MEDICAL & SURGICAL HISTORY:          PREVIOUS DIAGNOSTIC TESTING:    [ ] Echocardiogram:  [ ]  Catheterization:  [ ] Stress Test:  	    MEDICATIONS:  Home Medications:      MEDICATIONS  (STANDING):      FAMILY HISTORY:      SOCIAL HISTORY:    [x] Non-smoker  [ ] Smoker  [ ] Alcohol    Allergies    No Known Allergies    Intolerances    	    REVIEW OF SYSTEMS:  CONSTITUTIONAL: No fever, weight loss, or fatigue  EYES: No eye pain, visual disturbances, or discharge  ENMT:  No difficulty hearing, tinnitus, vertigo; No sinus or throat pain  NECK: No pain or stiffness  RESPIRATORY: No cough, wheezing, chills or hemoptysis; No Shortness of Breath  CARDIOVASCULAR: + chest pain,  no palpitations, passing out, dizziness, or leg swelling  GASTROINTESTINAL: No abdominal or epigastric pain. No nausea, vomiting, or hematemesis; No diarrhea or constipation. No melena or hematochezia.  GENITOURINARY: No dysuria, frequency, hematuria, or incontinence  NEUROLOGICAL: No headaches, memory loss, loss of strength, numbness, or tremors  SKIN: No itching, burning, rashes, or lesions   	    [x ] All others negative	see hpi   [ ] Unable to obtain    PHYSICAL EXAM:  T(C): 36.9 (03-24-21 @ 16:32), Max: 37.2 (03-24-21 @ 15:18)  HR: 78 (03-24-21 @ 16:32) (72 - 78)  BP: 165/75 (03-24-21 @ 16:32) (165/75 - 173/82)  RR: 18 (03-24-21 @ 16:32) (18 - 18)  SpO2: 100% (03-24-21 @ 16:32) (100% - 100%)  Wt(kg): --  I&O's Summary      Appearance: Normal	  Psychiatry: A & O x 3, Mood & affect appropriate  HEENT:   Normal oral mucosa, PERRL, EOMI	  Lymphatic: No lymphadenopathy  Cardiovascular: Normal S1 S2,RRR, No JVD, No murmurs  Respiratory: Lungs clear to auscultation	  Gastrointestinal:  Soft, Non-tender, + BS	  Skin: No rashes, No ecchymoses, No cyanosis	  Neurologic: Non-focal  Extremities: Normal range of motion, No clubbing, cyanosis or edema  Vascular: Peripheral pulses palpable 2+ bilaterally    TELEMETRY: NSR 	    ECG:  	NSR 71 - biphasic twave V1, slight ST elevation V2  RADIOLOGY:  OTHER: 	  	  LABS:	 	    CARDIAC MARKERS:                                  12.4   8.07  )-----------( 330      ( 24 Mar 2021 16:07 )             38.6             proBNP:   Lipid Profile:   HgA1c:   TSH:

## 2021-03-26 NOTE — PROGRESS NOTE ADULT - SUBJECTIVE AND OBJECTIVE BOX
CARDIOLOGY FOLLOW UP - Dr. Alcaraz    CC: denies cp, sob, and palpitations       PHYSICAL EXAM:  T(C): 36.9 (03-26-21 @ 09:03), Max: 36.9 (03-26-21 @ 04:54)  HR: 63 (03-26-21 @ 09:03) (63 - 79)  BP: 111/59 (03-26-21 @ 09:03) (98/66 - 133/68)  RR: 16 (03-26-21 @ 09:03) (16 - 18)  SpO2: 99% (03-26-21 @ 09:03) (96% - 99%)  Wt(kg): --  I&O's Summary    25 Mar 2021 07:01  -  26 Mar 2021 07:00  --------------------------------------------------------  IN: 0 mL / OUT: 0 mL / NET: 0 mL    26 Mar 2021 07:01  -  26 Mar 2021 13:57  --------------------------------------------------------  IN: 480 mL / OUT: 0 mL / NET: 480 mL        Appearance: Normal	  Cardiovascular: Normal S1 S2,RRR, No JVD, No murmurs  Respiratory: Lungs clear to auscultation	  Gastrointestinal:  Soft, Non-tender, + BS	  Extremities: Normal range of motion, No clubbing, cyanosis or edema      Home Medications:  aspirin 81 mg oral tablet: 1 tab(s) orally once a day (24 Mar 2021 18:17)  glimepiride 4 mg oral tablet: 1 tab(s) orally once a day (24 Mar 2021 18:17)  losartan-hydrochlorothiazide 100 mg-25 mg oral tablet: 1 tab(s) orally once a day (24 Mar 2021 18:17)  metFORMIN 1000 mg oral tablet: 1 tab(s) orally 2 times a day  HOLD  restart on 3/28/2021 (26 Mar 2021 10:50)  metoprolol tartrate 50 mg oral tablet: 1 tab(s) orally 3 times a day (24 Mar 2021 18:17)  simvastatin: 40 milligram(s) orally once a day (24 Mar 2021 18:17)      MEDICATIONS  (STANDING):  aspirin enteric coated 81 milliGRAM(s) Oral daily  atorvastatin 40 milliGRAM(s) Oral at bedtime  clopidogrel Tablet 75 milliGRAM(s) Oral daily  dextrose 40% Gel 15 Gram(s) Oral once  dextrose 5%. 1000 milliLiter(s) (50 mL/Hr) IV Continuous <Continuous>  dextrose 50% Injectable 25 Gram(s) IV Push once  enoxaparin Injectable 40 milliGRAM(s) SubCutaneous daily  glucagon  Injectable 1 milliGRAM(s) IntraMuscular once  hydrochlorothiazide 25 milliGRAM(s) Oral daily  insulin glargine Injectable (LANTUS) 12 Unit(s) SubCutaneous at bedtime  insulin lispro (ADMELOG) corrective regimen sliding scale   SubCutaneous three times a day before meals  losartan 100 milliGRAM(s) Oral daily  metoprolol tartrate 50 milliGRAM(s) Oral two times a day      TELEMETRY: NSR 	    ECG:  	  RADIOLOGY:   DIAGNOSTIC TESTING:  [ ] Echocardiogram:  [x ]  Catheterization:  Cardiac Cath Lab - Adult (03.25.21 @ 11:34)   CORONARY VESSELS: The coronary circulation is right dominant.  LM:   --  LM: Angiography showed minor luminal irregularities with no flow  limiting lesions.  LAD:   --  LAD: Angiography showed mild atherosclerosis with no flow  limiting lesions.  CX:   --  Circumflex: Angiography showed mild atherosclerosis with no flow  limiting lesions.  RCA:   --  Mid RCA: There was a 80 % stenosis.  COMPLICATIONS: Therewere no complications.  DIAGNOSTIC RECOMMENDATIONS: Successful angioplasty to mRCA severe ISR, with  adequate expansion and FILI 3 flow. IVUS revealing multiple stent layers  at site of lesion. Recommend brachytherapy in 3-4 weeks by Dr. Green.  Plan relayed to patient and patient's cardiologist.  INTERVENTIONAL RECOMMENDATIONS: Successful angioplasty to mRCA severe ISR,  with adequate expansion and FILI 3 flow. IVUS revealing multiple stent  layers at site of lesion. Recommend brachytherapy in 3-4 weeks by Dr. Green. Plan relayed to patient and patient's cardiologist.    < end of copied text >    [ ] Stress Test:    OTHER: 	    LABS:	 	    Creatine Kinase, Serum: 74 U/L [25 - 170] (03-24 @ 22:00)  CKMB Units: 2.9 ng/mL [0.0 - 3.8] (03-24 @ 22:00)  Troponin T, High Sensitivity Result: 20 ng/L [0 - 51] (03-24 @ 22:00)  Troponin T, High Sensitivity Result: 25 ng/L [0 - 51] (03-24 @ 16:07)                          12.4   8.55  )-----------( 324      ( 26 Mar 2021 01:56 )             38.2     03-26    141  |  105  |  28<H>  ----------------------------<  146<H>  3.7   |  23  |  1.06    Ca    9.6      26 Mar 2021 01:56    TPro  7.4  /  Alb  4.3  /  TBili  0.4  /  DBili  x   /  AST  19  /  ALT  11  /  AlkPhos  71  03-24    PT/INR - ( 24 Mar 2021 17:38 )   PT: 11.9 sec;   INR: 0.99 ratio         PTT - ( 24 Mar 2021 17:38 )  PTT:32.2 sec

## 2021-04-09 PROBLEM — I25.10 ATHEROSCLEROTIC HEART DISEASE OF NATIVE CORONARY ARTERY WITHOUT ANGINA PECTORIS: Chronic | Status: ACTIVE | Noted: 2021-03-24

## 2021-04-09 PROBLEM — E78.5 HYPERLIPIDEMIA, UNSPECIFIED: Chronic | Status: ACTIVE | Noted: 2021-03-24

## 2021-04-09 PROBLEM — I10 ESSENTIAL (PRIMARY) HYPERTENSION: Chronic | Status: ACTIVE | Noted: 2021-03-24

## 2021-04-09 PROBLEM — Z00.00 ENCOUNTER FOR PREVENTIVE HEALTH EXAMINATION: Status: ACTIVE | Noted: 2021-04-09

## 2021-04-09 PROBLEM — I73.9 PERIPHERAL VASCULAR DISEASE, UNSPECIFIED: Chronic | Status: ACTIVE | Noted: 2021-03-24

## 2021-04-10 DIAGNOSIS — Z01.818 ENCOUNTER FOR OTHER PREPROCEDURAL EXAMINATION: ICD-10-CM

## 2021-04-11 ENCOUNTER — APPOINTMENT (OUTPATIENT)
Dept: DISASTER EMERGENCY | Facility: CLINIC | Age: 81
End: 2021-04-11

## 2021-04-11 LAB — SARS-COV-2 N GENE NPH QL NAA+PROBE: NOT DETECTED

## 2021-04-12 ENCOUNTER — NON-APPOINTMENT (OUTPATIENT)
Age: 81
End: 2021-04-12

## 2021-04-14 ENCOUNTER — INPATIENT (INPATIENT)
Facility: HOSPITAL | Age: 81
LOS: 0 days | Discharge: HOME | DRG: 251 | End: 2021-04-15
Attending: INTERNAL MEDICINE | Admitting: INTERNAL MEDICINE
Payer: COMMERCIAL

## 2021-04-14 VITALS
SYSTOLIC BLOOD PRESSURE: 148 MMHG | TEMPERATURE: 98 F | OXYGEN SATURATION: 96 % | RESPIRATION RATE: 20 BRPM | WEIGHT: 164.02 LBS | HEIGHT: 65 IN | DIASTOLIC BLOOD PRESSURE: 94 MMHG | HEART RATE: 73 BPM

## 2021-04-14 DIAGNOSIS — I25.10 ATHEROSCLEROTIC HEART DISEASE OF NATIVE CORONARY ARTERY WITHOUT ANGINA PECTORIS: ICD-10-CM

## 2021-04-14 LAB
ANION GAP SERPL CALC-SCNC: 10 MMOL/L — SIGNIFICANT CHANGE UP (ref 5–17)
BUN SERPL-MCNC: 38 MG/DL — HIGH (ref 7–23)
CALCIUM SERPL-MCNC: 10 MG/DL — SIGNIFICANT CHANGE UP (ref 8.4–10.5)
CHLORIDE SERPL-SCNC: 104 MMOL/L — SIGNIFICANT CHANGE UP (ref 96–108)
CO2 SERPL-SCNC: 25 MMOL/L — SIGNIFICANT CHANGE UP (ref 22–31)
CREAT SERPL-MCNC: 1.09 MG/DL — SIGNIFICANT CHANGE UP (ref 0.5–1.3)
GLUCOSE SERPL-MCNC: 111 MG/DL — HIGH (ref 70–99)
HCT VFR BLD CALC: 38.5 % — SIGNIFICANT CHANGE UP (ref 34.5–45)
HGB BLD-MCNC: 12.1 G/DL — SIGNIFICANT CHANGE UP (ref 11.5–15.5)
MCHC RBC-ENTMCNC: 30.4 PG — SIGNIFICANT CHANGE UP (ref 27–34)
MCHC RBC-ENTMCNC: 31.4 GM/DL — LOW (ref 32–36)
MCV RBC AUTO: 96.7 FL — SIGNIFICANT CHANGE UP (ref 80–100)
NRBC # BLD: 0 /100 WBCS — SIGNIFICANT CHANGE UP (ref 0–0)
PLATELET # BLD AUTO: 324 K/UL — SIGNIFICANT CHANGE UP (ref 150–400)
POTASSIUM SERPL-MCNC: 4.7 MMOL/L — SIGNIFICANT CHANGE UP (ref 3.5–5.3)
POTASSIUM SERPL-SCNC: 4.7 MMOL/L — SIGNIFICANT CHANGE UP (ref 3.5–5.3)
RBC # BLD: 3.98 M/UL — SIGNIFICANT CHANGE UP (ref 3.8–5.2)
RBC # FLD: 12.8 % — SIGNIFICANT CHANGE UP (ref 10.3–14.5)
SODIUM SERPL-SCNC: 139 MMOL/L — SIGNIFICANT CHANGE UP (ref 135–145)
WBC # BLD: 8.03 K/UL — SIGNIFICANT CHANGE UP (ref 3.8–10.5)
WBC # FLD AUTO: 8.03 K/UL — SIGNIFICANT CHANGE UP (ref 3.8–10.5)

## 2021-04-14 RX ORDER — METOPROLOL TARTRATE 50 MG
50 TABLET ORAL THREE TIMES A DAY
Refills: 0 | Status: DISCONTINUED | OUTPATIENT
Start: 2021-04-14 | End: 2021-04-15

## 2021-04-14 RX ORDER — ACETAMINOPHEN 500 MG
650 TABLET ORAL ONCE
Refills: 0 | Status: COMPLETED | OUTPATIENT
Start: 2021-04-14 | End: 2021-04-14

## 2021-04-14 RX ORDER — GLIMEPIRIDE 1 MG
1 TABLET ORAL
Qty: 0 | Refills: 0 | DISCHARGE

## 2021-04-14 RX ORDER — LOSARTAN POTASSIUM 100 MG/1
100 TABLET, FILM COATED ORAL DAILY
Refills: 0 | Status: DISCONTINUED | OUTPATIENT
Start: 2021-04-14 | End: 2021-04-15

## 2021-04-14 RX ORDER — CLOPIDOGREL BISULFATE 75 MG/1
75 TABLET, FILM COATED ORAL DAILY
Refills: 0 | Status: DISCONTINUED | OUTPATIENT
Start: 2021-04-14 | End: 2021-04-15

## 2021-04-14 RX ORDER — SODIUM CHLORIDE 9 MG/ML
1000 INJECTION, SOLUTION INTRAVENOUS
Refills: 0 | Status: DISCONTINUED | OUTPATIENT
Start: 2021-04-14 | End: 2021-04-15

## 2021-04-14 RX ORDER — INSULIN LISPRO 100/ML
VIAL (ML) SUBCUTANEOUS AT BEDTIME
Refills: 0 | Status: DISCONTINUED | OUTPATIENT
Start: 2021-04-14 | End: 2021-04-15

## 2021-04-14 RX ORDER — INSULIN LISPRO 100/ML
VIAL (ML) SUBCUTANEOUS
Refills: 0 | Status: DISCONTINUED | OUTPATIENT
Start: 2021-04-14 | End: 2021-04-15

## 2021-04-14 RX ORDER — DEXTROSE 50 % IN WATER 50 %
15 SYRINGE (ML) INTRAVENOUS ONCE
Refills: 0 | Status: DISCONTINUED | OUTPATIENT
Start: 2021-04-14 | End: 2021-04-15

## 2021-04-14 RX ORDER — DEXTROSE 50 % IN WATER 50 %
25 SYRINGE (ML) INTRAVENOUS ONCE
Refills: 0 | Status: DISCONTINUED | OUTPATIENT
Start: 2021-04-14 | End: 2021-04-15

## 2021-04-14 RX ORDER — HYDROCHLOROTHIAZIDE 25 MG
25 TABLET ORAL DAILY
Refills: 0 | Status: DISCONTINUED | OUTPATIENT
Start: 2021-04-14 | End: 2021-04-15

## 2021-04-14 RX ORDER — DEXTROSE 50 % IN WATER 50 %
12.5 SYRINGE (ML) INTRAVENOUS ONCE
Refills: 0 | Status: DISCONTINUED | OUTPATIENT
Start: 2021-04-14 | End: 2021-04-15

## 2021-04-14 RX ORDER — GLUCAGON INJECTION, SOLUTION 0.5 MG/.1ML
1 INJECTION, SOLUTION SUBCUTANEOUS ONCE
Refills: 0 | Status: DISCONTINUED | OUTPATIENT
Start: 2021-04-14 | End: 2021-04-15

## 2021-04-14 RX ORDER — ASPIRIN/CALCIUM CARB/MAGNESIUM 324 MG
81 TABLET ORAL DAILY
Refills: 0 | Status: DISCONTINUED | OUTPATIENT
Start: 2021-04-14 | End: 2021-04-15

## 2021-04-14 RX ORDER — GABAPENTIN 400 MG/1
100 CAPSULE ORAL THREE TIMES A DAY
Refills: 0 | Status: DISCONTINUED | OUTPATIENT
Start: 2021-04-14 | End: 2021-04-15

## 2021-04-14 RX ORDER — ATORVASTATIN CALCIUM 80 MG/1
40 TABLET, FILM COATED ORAL AT BEDTIME
Refills: 0 | Status: DISCONTINUED | OUTPATIENT
Start: 2021-04-14 | End: 2021-04-15

## 2021-04-14 RX ORDER — SODIUM CHLORIDE 9 MG/ML
250 INJECTION INTRAMUSCULAR; INTRAVENOUS; SUBCUTANEOUS ONCE
Refills: 0 | Status: COMPLETED | OUTPATIENT
Start: 2021-04-14 | End: 2021-04-14

## 2021-04-14 RX ADMIN — Medication 50 MILLIGRAM(S): at 22:58

## 2021-04-14 RX ADMIN — SODIUM CHLORIDE 750 MILLILITER(S): 9 INJECTION INTRAMUSCULAR; INTRAVENOUS; SUBCUTANEOUS at 10:35

## 2021-04-14 RX ADMIN — Medication 650 MILLIGRAM(S): at 19:24

## 2021-04-14 RX ADMIN — ATORVASTATIN CALCIUM 40 MILLIGRAM(S): 80 TABLET, FILM COATED ORAL at 22:59

## 2021-04-14 RX ADMIN — GABAPENTIN 100 MILLIGRAM(S): 400 CAPSULE ORAL at 22:59

## 2021-04-14 RX ADMIN — Medication 650 MILLIGRAM(S): at 18:00

## 2021-04-14 NOTE — H&P CARDIOLOGY - HISTORY OF PRESENT ILLNESS
80 year old female (no implantable devices) with a PMHx of CAD (hx of stents), PAD (recent RLE PAD stent 11/2020), HTN, DMT2 (last A1c unknown). C/o new SOB and chest discomfort with exertion. States initially thought it was indigestion took Tums and it resolved. Presented to Cardiologists office and found to have abnormal EKG. Recent hospital admission 3/2021 had PCI with POBA to pCx, discharged and planned for brachytherapy today. She currently denies any complaints.

## 2021-04-14 NOTE — ASU PATIENT PROFILE, ADULT - REASON FOR ADMISSION, PROFILE
here for angiogram, EKG abnormal, c/o SOB and indigestion c/o SOB and indigestion, EKG abnormal, here for brachy therapy

## 2021-04-15 ENCOUNTER — TRANSCRIPTION ENCOUNTER (OUTPATIENT)
Age: 81
End: 2021-04-15

## 2021-04-15 VITALS
SYSTOLIC BLOOD PRESSURE: 109 MMHG | RESPIRATION RATE: 18 BRPM | HEART RATE: 69 BPM | OXYGEN SATURATION: 97 % | TEMPERATURE: 97 F | DIASTOLIC BLOOD PRESSURE: 66 MMHG

## 2021-04-15 LAB
COVID-19 SPIKE DOMAIN AB INTERP: POSITIVE
COVID-19 SPIKE DOMAIN ANTIBODY RESULT: >250 U/ML — HIGH
SARS-COV-2 IGG+IGM SERPL QL IA: >250 U/ML — HIGH
SARS-COV-2 IGG+IGM SERPL QL IA: POSITIVE

## 2021-04-15 PROCEDURE — 92974 CATH PLACE CARDIO BRACHYTX: CPT

## 2021-04-15 PROCEDURE — 99152 MOD SED SAME PHYS/QHP 5/>YRS: CPT

## 2021-04-15 PROCEDURE — C1769: CPT

## 2021-04-15 PROCEDURE — 93005 ELECTROCARDIOGRAM TRACING: CPT

## 2021-04-15 PROCEDURE — 82962 GLUCOSE BLOOD TEST: CPT

## 2021-04-15 PROCEDURE — 99153 MOD SED SAME PHYS/QHP EA: CPT

## 2021-04-15 PROCEDURE — 85027 COMPLETE CBC AUTOMATED: CPT

## 2021-04-15 PROCEDURE — C1894: CPT

## 2021-04-15 PROCEDURE — 86769 SARS-COV-2 COVID-19 ANTIBODY: CPT

## 2021-04-15 PROCEDURE — 80048 BASIC METABOLIC PNL TOTAL CA: CPT

## 2021-04-15 PROCEDURE — 92920 PRQ TRLUML C ANGIOP 1ART&/BR: CPT | Mod: RC

## 2021-04-15 PROCEDURE — C1887: CPT

## 2021-04-15 PROCEDURE — C1725: CPT

## 2021-04-15 RX ORDER — METFORMIN HYDROCHLORIDE 850 MG/1
1 TABLET ORAL
Qty: 0 | Refills: 0 | DISCHARGE

## 2021-04-15 RX ADMIN — Medication 2: at 09:02

## 2021-04-15 RX ADMIN — Medication 50 MILLIGRAM(S): at 05:51

## 2021-04-15 RX ADMIN — Medication 25 MILLIGRAM(S): at 05:51

## 2021-04-15 RX ADMIN — GABAPENTIN 100 MILLIGRAM(S): 400 CAPSULE ORAL at 05:51

## 2021-04-15 RX ADMIN — LOSARTAN POTASSIUM 100 MILLIGRAM(S): 100 TABLET, FILM COATED ORAL at 05:51

## 2021-04-15 RX ADMIN — Medication 2: at 13:46

## 2021-04-15 NOTE — DISCHARGE NOTE PROVIDER - PROVIDER TOKENS
PROVIDER:[TOKEN:[[id number]],FOLLOWUP:[1 week]] PROVIDER:[TOKEN:[[id number]],FOLLOWUP:[1 week]]

## 2021-04-15 NOTE — DISCHARGE NOTE PROVIDER - NSDCMRMEDTOKEN_GEN_ALL_CORE_FT
aspirin 81 mg oral tablet: 1 tab(s) orally once a day  gabapentin 100 mg oral capsule: 1 cap(s) orally 3 times a day  glimepiride 4 mg oral tablet: 2 tab(s) orally once a day  losartan-hydrochlorothiazide 100 mg-25 mg oral tablet: 1 tab(s) orally once a day  metFORMIN 1000 mg oral tablet: 1 tab(s) orally 2 times a day  HOLD  restart on 4/16/21  metoprolol tartrate 50 mg oral tablet: 1 tab(s) orally 3 times a day  Plavix 75 mg oral tablet: 1 tab(s) orally once a day MDD:1  simvastatin: 40 milligram(s) orally once a day  Toujeo Max SoloStar 300 units/mL subcutaneous solution: 6 unit(s) subcutaneous 2 times a day as directed  Victoza 18 mg/3 mL subcutaneous solution: 1 unit(s) subcutaneous once a day 6 units

## 2021-04-15 NOTE — CONSULT NOTE ADULT - SUBJECTIVE AND OBJECTIVE BOX
CARDIOLOGY CONSULT - Dr. Alcaraz         HPI:  80 year old female (no implantable devices) with a PMHx of CAD (hx of stents), PAD (recent RLE PAD stent 11/2020), HTN, DMT2 (last A1c unknown) presented for elective brachytherapy. Recent hospital admission 3/2021 had PCI with POBA to pCx, discharged and planned for brachytherapy. She currently denies any complaints.  Patient denies any chest pain, dyspnea, palpitations, cough, syncope, edema, exertional symptoms, nausea, abdominal pain, fever, chills,  or rash.      PAST MEDICAL & SURGICAL HISTORY:  Hypertension    Coronary artery disease    Hyperlipidemia    Peripheral artery disease            PREVIOUS DIAGNOSTIC TESTING:    [ ] Echocardiogram:    [ x]  Catheterization:   Cardiac Cath Lab - Adult (03.25.21 @ 11:34)   PROCEDURE:  --  Left coronary angiography.  --  Right coronary angiography.  --  Sonosite - Diagnostic.  --  Sheath Exchange for Intervention.  --  Intervention on mid RCA: balloon angioplasty.  TECHNIQUE: The risks and alternatives of the procedures and conscious  sedation were explained to the patient and informed consent was obtained.  Cardiac catheterization performed electively.  Local anesthetic given. Right femoral artery access. Left coronary artery  angiography. The vessel was injected utilizing a catheter. Right coronary  artery angiography. The vessel was injected utilizing a catheter. Sonosite  - Diagnostic. RADIATION EXPOSURE: 7.5 min. A balloon angioplasty was  performed on the 80 % lesion in the mid RCA. Following intervention there  was a 1 % residual stenosis. Vessel setup was performed. A 6FR JR4  LAUNCHER guiding catheter was used to intubate the vessel. Vessel setup  was performed. A ARSENIO [id number] wire was used to cross the lesion. Balloon  angioplasty was performed, using a 2.0 X 20 EUPHORA balloon, with 2  inflations and a maximum inflation pressure of 20 jony. Balloon angioplasty  was performed, using a 3.00 X 20 EUPHORA NC balloon, with 2 inflations.  Sheath Exchange for Intervention.  CONTRAST GIVEN: Omnipaque 20 ml. Omnipaque 30 ml.  MEDICATIONS GIVEN: Fentanyl, 25 mcg, IV. Nicardipine (Cardene), 250 mcg,  intracoronary. Heparin, 7000 units, IV.  CORONARY VESSELS: The coronary circulation is right dominant.  LM:   --  LM: Angiography showed minor luminal irregularities with no flow  limiting lesions.  LAD:   --  LAD: Angiography showed mild atherosclerosis with no flow  limiting lesions.  CX:   --  Circumflex: Angiography showed mild atherosclerosis with no flow  limiting lesions.  RCA:   --  Mid RCA: There was a 80 % stenosis.  COMPLICATIONS: Therewere no complications.  DIAGNOSTIC RECOMMENDATIONS: Successful angioplasty to mRCA severe ISR, with  adequate expansion and FILI 3 flow. IVUS revealing multiple stent layers  at site of lesion. Recommend brachytherapy in 3-4 weeks by Dr. Green.  Plan relayed to patient and patient's cardiologist.  INTERVENTIONAL RECOMMENDATIONS: Successful angioplasty to mRCA severe ISR,  with adequate expansion and FILI 3 flow. IVUS revealing multiple stent  layers at site of lesion. Recommend brachytherapy in 3-4 weeks by Dr. Green. Plan relayed to patient and patient's cardiologist.        [ ] Stress Test:  	    MEDICATIONS:  Home Medications:  aspirin 81 mg oral tablet: 1 tab(s) orally once a day (14 Apr 2021 09:44)  gabapentin 100 mg oral capsule: 1 cap(s) orally 3 times a day (14 Apr 2021 14:06)  glimepiride 4 mg oral tablet: 2 tab(s) orally once a day (14 Apr 2021 14:06)  losartan-hydrochlorothiazide 100 mg-25 mg oral tablet: 1 tab(s) orally once a day (14 Apr 2021 09:44)  metFORMIN 1000 mg oral tablet: 1 tab(s) orally 2 times a day  HOLD  restart on 4/16/21 (15 Apr 2021 11:21)  metoprolol tartrate 50 mg oral tablet: 1 tab(s) orally 3 times a day (14 Apr 2021 09:44)  simvastatin: 40 milligram(s) orally once a day (14 Apr 2021 09:44)  Toujeo Max SoloStar 300 units/mL subcutaneous solution: 6 unit(s) subcutaneous 2 times a day as directed (14 Apr 2021 14:06)  Victoza 18 mg/3 mL subcutaneous solution: 1 unit(s) subcutaneous once a day 6 units  (14 Apr 2021 14:06)      MEDICATIONS  (STANDING):  aspirin enteric coated 81 milliGRAM(s) Oral daily  atorvastatin 40 milliGRAM(s) Oral at bedtime  clopidogrel Tablet 75 milliGRAM(s) Oral daily  dextrose 40% Gel 15 Gram(s) Oral once  dextrose 5%. 1000 milliLiter(s) (50 mL/Hr) IV Continuous <Continuous>  dextrose 5%. 1000 milliLiter(s) (100 mL/Hr) IV Continuous <Continuous>  dextrose 50% Injectable 25 Gram(s) IV Push once  dextrose 50% Injectable 12.5 Gram(s) IV Push once  dextrose 50% Injectable 25 Gram(s) IV Push once  gabapentin 100 milliGRAM(s) Oral three times a day  glucagon  Injectable 1 milliGRAM(s) IntraMuscular once  hydrochlorothiazide 25 milliGRAM(s) Oral daily  insulin lispro (ADMELOG) corrective regimen sliding scale   SubCutaneous three times a day before meals  insulin lispro (ADMELOG) corrective regimen sliding scale   SubCutaneous at bedtime  losartan 100 milliGRAM(s) Oral daily  metoprolol tartrate 50 milliGRAM(s) Oral three times a day      FAMILY HISTORY:  No pertinent family history in first degree relatives        SOCIAL HISTORY:    [x ] Non-smoker  [ ] Smoker  [ ] Alcohol    Allergies    No Known Allergies    Intolerances    	    REVIEW OF SYSTEMS:  CONSTITUTIONAL: No fever, weight loss, or fatigue  EYES: No eye pain, visual disturbances, or discharge  ENMT:  No difficulty hearing, tinnitus, vertigo; No sinus or throat pain  NECK: No pain or stiffness  RESPIRATORY: No cough, wheezing, chills or hemoptysis; No Shortness of Breath  CARDIOVASCULAR: No chest pain, palpitations, passing out, dizziness, or leg swelling  GASTROINTESTINAL: No abdominal or epigastric pain. No nausea, vomiting, or hematemesis; No diarrhea or constipation. No melena or hematochezia.  GENITOURINARY: No dysuria, frequency, hematuria, or incontinence  NEUROLOGICAL: No headaches, memory loss, loss of strength, numbness, or tremors  SKIN: No itching, burning, rashes, or lesions   	    [x ] All others negative	see hpi  [ ] Unable to obtain    PHYSICAL EXAM:  T(C): 36.3 (04-15-21 @ 05:00), Max: 36.7 (04-14-21 @ 14:15)  HR: 69 (04-15-21 @ 05:00) (65 - 74)  BP: 109/66 (04-15-21 @ 05:00) (109/66 - 173/74)  RR: 18 (04-15-21 @ 05:00) (15 - 22)  SpO2: 97% (04-15-21 @ 05:00) (95% - 100%)  Wt(kg): --  I&O's Summary    15 Apr 2021 07:01  -  15 Apr 2021 11:52  --------------------------------------------------------  IN: 240 mL / OUT: 0 mL / NET: 240 mL        Appearance: Normal	  Psychiatry: A & O x 3, Mood & affect appropriate  HEENT:   Normal oral mucosa, PERRL, EOMI	  Lymphatic: No lymphadenopathy  Cardiovascular: Normal S1 S2,RRR, No JVD, No murmurs  Respiratory: Lungs clear to auscultation	  Gastrointestinal:  Soft, Non-tender, + BS	  Skin: No rashes, No ecchymoses, No cyanosis	  Neurologic: Non-focal  Extremities: Normal range of motion, No clubbing, cyanosis or edema  Vascular: Peripheral pulses palpable 2+ bilaterally    TELEMETRY: NSR 	    ECG:  	nsr @ 68bpm - grossly unchanged from prior   RADIOLOGY:  Cardiac Cath Lab - Adult (04.14.21 @ 12:28)   CORONARY VESSELS:  RCA:   --  Mid RCA: There was a 80 % stenosis. The lesion was mildly  calcified.  COMPLICATIONS: There were no complications.  DIAGNOSTIC IMPRESSIONS: Severe ISR of the mid RCA.  S/P succesful POBA to the lesion.  Unsuccessful deployment of the brachytherapy catheter due to lesion  calcification and recoil following angioplasty.  DIAGNOSTIC RECOMMENDATIONS: Continue current medical therapy.  Plan to repeat procedure with laser atherectomy in 1-2 weeks pending  clinical course.  INTERVENTIONAL IMPRESSIONS: Severe ISR of the mid RCA.  S/P succesful POBA to the lesion.  Unsuccessful deployment of the brachytherapy catheter due to lesion  calcification and recoil following angioplasty.  INTERVENTIONAL RECOMMENDATIONS: Continue current medical therapy.  Plan to repeat procedure with laser atherectomy in 1-2 weeks pending  clinical course.        OTHER: 	  	  LABS:	 	    CARDIAC MARKERS:                                  12.1   8.03  )-----------( 324      ( 14 Apr 2021 09:47 )             38.5     04-14    139  |  104  |  38<H>  ----------------------------<  111<H>  4.7   |  25  |  1.09    Ca    10.0      14 Apr 2021 09:47        proBNP:   Lipid Profile:   HgA1c:   TSH:       
80 year old female (no implantable devices) with a PMHx of CAD (hx of stents), PAD (recent RLE PAD stent 11/2020), HTN, DMT2 (last A1c unknown). C/o new SOB and chest discomfort with exertion. States initially thought it was indigestion took Tums and it resolved. Presented to Cardiologists office and found to have abnormal EKG. Recent hospital admission 3/2021 had PCI with POBA to pCx, discharged and planned for brachytherapy today. She currently denies any complaints.   (14 Apr 2021 09:16)      PAST MEDICAL & SURGICAL HISTORY:  Hypertension    Coronary artery disease    Hyperlipidemia    Peripheral artery disease        Review of Systems:   CONSTITUTIONAL: No fever, weight loss, or fatigue  EYES: No eye pain, visual disturbances, or discharge  ENMT:  No difficulty hearing, tinnitus, vertigo; No sinus or throat pain  NECK: No pain or stiffness  BREASTS: No pain, masses, or nipple discharge  RESPIRATORY: No cough, wheezing, chills or hemoptysis; has  shortness of breath  CARDIOVASCULAR: chest pain, nO palpitations, dizziness, or leg swelling  GASTROINTESTINAL: No abdominal or epigastric pain. No nausea, vomiting, or hematemesis; No diarrhea or constipation. No melena or hematochezia.  GENITOURINARY: No dysuria, frequency, hematuria, or incontinence  NEUROLOGICAL: No headaches, memory loss, loss of strength, numbness, or tremors  SKIN: No itching, burning, rashes, or lesions   LYMPH NODES: No enlarged glands  ENDOCRINE: No heat or cold intolerance; No hair loss  MUSCULOSKELETAL: No joint pain or swelling; No muscle, back, or extremity pain  PSYCHIATRIC: No depression, anxiety, mood swings, or difficulty sleeping  HEME/LYMPH: No easy bruising, or bleeding gums  ALLERY AND IMMUNOLOGIC: No hives or eczema    Allergies    No Known Allergies    Intolerances        Social History:     FAMILY HISTORY:  No pertinent family history in first degree relatives        MEDICATIONS  (STANDING):  aspirin enteric coated 81 milliGRAM(s) Oral daily  atorvastatin 40 milliGRAM(s) Oral at bedtime  clopidogrel Tablet 75 milliGRAM(s) Oral daily  dextrose 40% Gel 15 Gram(s) Oral once  dextrose 5%. 1000 milliLiter(s) (50 mL/Hr) IV Continuous <Continuous>  dextrose 5%. 1000 milliLiter(s) (100 mL/Hr) IV Continuous <Continuous>  dextrose 50% Injectable 25 Gram(s) IV Push once  dextrose 50% Injectable 12.5 Gram(s) IV Push once  dextrose 50% Injectable 25 Gram(s) IV Push once  gabapentin 100 milliGRAM(s) Oral three times a day  glucagon  Injectable 1 milliGRAM(s) IntraMuscular once  hydrochlorothiazide 25 milliGRAM(s) Oral daily  insulin lispro (ADMELOG) corrective regimen sliding scale   SubCutaneous three times a day before meals  insulin lispro (ADMELOG) corrective regimen sliding scale   SubCutaneous at bedtime  losartan 100 milliGRAM(s) Oral daily  metoprolol tartrate 50 milliGRAM(s) Oral three times a day    MEDICATIONS  (PRN):      Vital Signs Last 24 Hrs  T(C): 36.3 (15 Apr 2021 05:00), Max: 36.7 (14 Apr 2021 14:15)  T(F): 97.4 (15 Apr 2021 05:00), Max: 98.1 (14 Apr 2021 14:15)  HR: 69 (15 Apr 2021 05:00) (65 - 74)  BP: 109/66 (15 Apr 2021 05:00) (109/66 - 173/74)  BP(mean): --  RR: 18 (15 Apr 2021 05:00) (15 - 22)  SpO2: 97% (15 Apr 2021 05:00) (95% - 100%)  CAPILLARY BLOOD GLUCOSE      POCT Blood Glucose.: 211 mg/dL (15 Apr 2021 08:55)  POCT Blood Glucose.: 172 mg/dL (14 Apr 2021 22:11)  POCT Blood Glucose.: 144 mg/dL (14 Apr 2021 16:50)    I&O's Summary    15 Apr 2021 07:01  -  15 Apr 2021 12:42  --------------------------------------------------------  IN: 240 mL / OUT: 0 mL / NET: 240 mL        PHYSICAL EXAM:  GENERAL: NAD, well-developed  HEAD:  Atraumatic, Normocephalic  EYES: EOMI, PERRLA, conjunctiva and sclera clear  NECK: Supple, No JVD  CHEST/LUNG: Clear to auscultation bilaterally; No wheeze  HEART: Regular rate and rhythm; No murmurs, rubs, or gallops  ABDOMEN: Soft, Nontender, Nondistended; Bowel sounds present  EXTREMITIES:  2+ Peripheral Pulses, No clubbing, cyanosis, or edema  PSYCH: AAOx3  NEUROLOGY: non-focal  SKIN: No rashes or lesions    LABS:                        12.1   8.03  )-----------( 324      ( 14 Apr 2021 09:47 )             38.5     04-14    139  |  104  |  38<H>  ----------------------------<  111<H>  4.7   |  25  |  1.09    Ca    10.0      14 Apr 2021 09:47                RADIOLOGY & ADDITIONAL TESTS:    Imaging Personally Reviewed:    Consultant(s) Notes Reviewed:      Care Discussed with Consultants/Other Providers:

## 2021-04-15 NOTE — DISCHARGE NOTE NURSING/CASE MANAGEMENT/SOCIAL WORK - PATIENT PORTAL LINK FT
You can access the FollowMyHealth Patient Portal offered by [facility] by registering at the following website: [email or web address]. By joining [facility]’s FollowMyHealth portal, you will also be able to view your health information using other applications (apps) compatible with our system.

## 2021-04-15 NOTE — DISCHARGE NOTE PROVIDER - NSDCCPCAREPLAN_GEN_ALL_CORE_FT
PRINCIPAL DISCHARGE DIAGNOSIS  Diagnosis: Unstable angina  Assessment and Plan of Treatment: You had angioplasty done   follow up with cardiology  continue aspirin and plavix      SECONDARY DISCHARGE DIAGNOSES  Diagnosis: Benign hypertension  Assessment and Plan of Treatment: Low salt diet  Activity as tolerated.  Take all medication as prescribed.  Follow up with your medical doctor for routine blood pressure monitoring at your next visit.  Notify your doctor if you have any of the following symptoms:   Dizziness, Lightheadedness, Blurry vision, Headache, Chest pain, Shortness of breath      Diagnosis: Type 2 diabetes mellitus  Assessment and Plan of Treatment: HgA1C this admission  7.2  Make sure you get your HgA1c checked every three months.  If you take oral diabetes medications, check your blood glucose two times a day.  If you take insulin, check your blood glucose before meals and at bedtime.  It's important not to skip any meals.  Keep a log of your blood glucose results and always take it with you to your doctor appointments.  Keep a list of your current medications including injectables and over the counter medications and bring this medication list with you to all your doctor appointments.  If you have not seen your ophthalmologist this year call for appointment.  Check your feet daily for redness, sores, or openings. Do not self treat. If no improvement in two days call your primary care physician for an appointment.  Low blood sugar (hypoglycemia) is a blood sugar below 70mg/dl. Check your blood sugar if you feel signs/symptoms of hypoglycemia. If your blood sugar is below 70 take 15 grams of carbohydrates (ex 4 oz of apple juice, 3-4 glucose tablets, or 4-6 oz of regular soda) wait 15 minutes and repeat blood sugar to make sure it comes up above 70.  If your blood sugar is above 70 and you are due for a meal, have a meal.  If you are not due for a meal have a snack.  This snack helps keeps your blood sugar at a safe range.

## 2021-04-15 NOTE — PATIENT PROFILE ADULT - DO YOU FEEL UNSAFE AT HOME, WORK, OR SCHOOL?
History:   Diagnosis Date    Diabetes mellitus ([facility] [address]     Hypertension      SURGICAL HISTORY     No past surgical history on file. CURRENT MEDICATIONS       There are no discharge medications for this patient. ALLERGIES     has No Known Allergies. FAMILY HISTORY     has no family status information on file. SOCIAL HISTORY       Social History     Tobacco Use    Smoking status: Never Smoker    Smokeless tobacco: Never Used   Substance Use Topics    Alcohol use: Never     Frequency: Never    Drug use: Never     PHYSICAL EXAM     INITIAL VITALS: /61   Pulse 77   Temp 97.2 °F (36.2 °C)   Resp 16   Ht 5' 3\" (1.6 m)   Wt 198 lb (89.8 kg)   SpO2 98%   BMI 35.07 kg/m²    Physical Exam  Vitals signs and nursing note reviewed. Constitutional:       General: She is not in acute distress. Appearance: She is not ill-appearing, toxic-appearing or diaphoretic. HENT:      Head: Normocephalic and atraumatic. Eyes:      Extraocular Movements: Extraocular movements intact. Pupils: Pupils are equal, round, and reactive to light. Neck:      Musculoskeletal: Normal range of motion and neck supple. Cardiovascular:      Rate and Rhythm: Normal rate and regular rhythm. Pulmonary:      Effort: Pulmonary effort is normal. No respiratory distress. Abdominal:      Palpations: Abdomen is soft. Tenderness: There is no abdominal tenderness. Musculoskeletal: Normal range of motion. General: No deformity. Right hip: Normal.      Left hip: Normal.      Cervical back: She exhibits tenderness. She exhibits no bony tenderness. Thoracic back: Normal.      Lumbar back: Normal.      Comments: Paraspinal tenderness left side of the neck. She has occipital tenderness left back of the head area. There is no step-offs deformities no crepitus. No lacerations   Skin:     General: Skin is warm and dry. Capillary Refill: Capillary refill takes less than 2 seconds.    Neurological: General: No focal deficit present. Mental Status: She is alert and oriented to person, place, and time. GCS: GCS eye subscore is 4. GCS verbal subscore is 5. GCS motor subscore is 6. Cranial Nerves: No dysarthria or facial asymmetry. Sensory: Sensation is intact. Motor: Motor function is intact. No pronator drift. Coordination: Finger-Nose-Finger Test normal.      Comments: Strength is 5/5 all extremities   Psychiatric:         Thought Content: Thought content normal.         MEDICAL DECISION MAKING:          Please see ED Course below for MDM/ED course. DDx: Head injury, infection, arrhythmia, ACS, medication side effect    All patient's question's and concerns were answered prior to disposition and patient and/or family expressed understanding and agreement of treatment plan. NIH STROKE SCALE:            PROCEDURES:    Procedures    DIAGNOSTIC RESULTS   EKG:All EKG's are interpreted by the Emergency Department Physician who either signs or Co-signs this chart in the absence of a cardiologist.    Sinus rhythm rate of 74 first-degree AV block NM is prolonged 332 QRS is also prolonged at 146 with right bundle branch block, QTC also prolonged at 510, axis is normal, no ST elevations or depressions there is T wave inversion lead III, Q wave lead III, abnormal EKG; no prior for comparison    RADIOLOGY:All plain film, CT, MRI, and formal ultrasound images (except ED bedside ultrasound) are read by the radiologist, see reports below, unless otherwisenoted in MDM or here. XR CHEST 1 VIEW   Final Result   Borderline cardiac size. Otherwise unremarkable chest appearance. CT CERVICAL SPINE WO CONTRAST   Final Result   1. No acute fracture or traumatic malalignment. Multilevel degenerative and   degenerative disc changes, canal stenosis C5-C6 and multilevel neural   foraminal narrowing. 2.  Heterogenous thyroid, with nodule of 18 mm in the right lobe.   Ultrasound workup of the thyroid is advised. RECOMMENDATIONS:   Advise ultrasound of the thyroid which can be performed on an outpatient   nonemergent basis. CT HEAD WO CONTRAST   Final Result   1. No acute fracture or traumatic malalignment. Multilevel degenerative and   degenerative disc changes, canal stenosis C5-C6 and multilevel neural   foraminal narrowing. 2.  Heterogenous thyroid, with nodule of 18 mm in the right lobe. Ultrasound   workup of the thyroid is advised. RECOMMENDATIONS:   Advise ultrasound of the thyroid which can be performed on an outpatient   nonemergent basis. LABS: All lab results were reviewed by myself, and all abnormals are listed below. Labs Reviewed   CBC WITH AUTO DIFFERENTIAL - Abnormal; Notable for the following components:       Result Value    Platelets 600 (*)     Seg Neutrophils 72 (*)     Lymphocytes 18 (*)     Absolute Lymph # 1.09 (*)     All other components within normal limits   BASIC METABOLIC PANEL - Abnormal; Notable for the following components:    Glucose 192 (*)     CREATININE 1.81 (*)     Sodium 130 (*)     GFR Non- 28 (*)     GFR  34 (*)     All other components within normal limits   TROP/MYOGLOBIN - Abnormal; Notable for the following components:    Troponin, High Sensitivity 20 (*)     Myoglobin 237 (*)     All other components within normal limits   MAGNESIUM   TSH WITH REFLEX       EMERGENCY DEPARTMENTCOURSE:     Patient is a 77-year-old female here with left-sided neck and occipital pain after recent fall. She is hypotensive and reportedly was orthostatic at the PCP office. She is on Eliquis. She has been having possible syncopal episodes. On exam here in no distress speaking full sentences GCS 15. Neurologically intact. No signs of basilar skull fracture. Moving all extremities well. Will CT head neck, check cardiac work-up EKG and give fluids if repeat BP low and reassess.   EKG does show significant prolonged MD at 332 QRS at 146 and . First-degree AV block. Report from Dr. Christiana Centeno stated she wanted CT head and neck imaging as patient does have a new tremor and rule out stroke. No acute stroke findings on exam. NIH 0. Repeat BP normal. CT imaging negative for acute findings, I did update patient on thyroid findings and she has a thyroid doctor and had this addressed prior. Patient signed out to Dr. Rajinder Kirk pending blood work. Vitals:    Vitals:    04/12/21 1558 04/12/21 1805   BP: (!) 64/52 118/61   Pulse: 84 77   Resp: 17 16   Temp: 97.2 °F (36.2 °C)    SpO2: 98%    Weight: 198 lb (89.8 kg)    Height: 5' 3\" (1.6 m)        The patient was given the following medications while in the emergency department:  Orders Placed This Encounter   Medications    0.9 % sodium chloride bolus     CONSULTS:  None    FINAL IMPRESSION      1. Neck pain    2. Fall, initial encounter    3. First degree AV block          DISPOSITION/PLAN   DISPOSITION  Discharge    PATIENT REFERRED TO:  Maddy Alvarado MD  [address] [place] [address] [id number]      As needed    [facility] ED  [address]  [phone]    If symptoms worsen    DISCHARGE MEDICATIONS:  There are no discharge medications for this patient. Vivien Gomez MD  Attending Emergency Physician    This note was created with the assistance of a speech-recognition program. While intending to generate a document that actually reflects the content of the visit, no guarantees can be provided that every mistake has been identified and corrected by editing.                     Vivien Gomez MD  04/12/21 2028 no

## 2021-04-15 NOTE — CONSULT NOTE ADULT - ASSESSMENT
80 year old female (no implantable devices) with a PMHx of CAD (hx of stents), PAD (recent RLE PAD stent 11/2020), HTN, DMT2 (last A1c unknown) presented for elective brachytherapy.    Unstable Angina/ CAD x 3 stents   -no further cp  -EKG noted with biphasic twave V1, slight ST elevation V2  -LHC with POBA of mid RCA (80%) , Unsuccessful deployment of the brachytherapy catheter due to lesion calcification and recoil following angioplasty  -plan for brachytherapy in a few weeks   -c/w asa, plavix, statin, bb     HTN  -bp stable  -c/w current meds HCTZ, metoprolol, and losaran    DM  - hgb a1c 7.2  -resume metformin 48 hours post cath  - iss  - fs qid    neuropathy  - c/w neurontin    dvt ppx

## 2021-04-15 NOTE — CONSULT NOTE ADULT - ASSESSMENT
[facility] 4/14/21: POBA of mid RCA (80%)   [facility] 3/25/21: PCI/POBA to pCX 90%     a/p  80 year old female (no implantable devices) with a PMHx of CAD (hx of stents), PAD (recent RLE PAD stent 11/2020), HTN, DMT2 (last A1c unknown) presented for elective brachytherapy.    1. Unstable Angina/ CAD x 3 stents   -no further cp  -EKG noted with biphasic twave V1, slight ST elevation V2  -[facility] with POBA of mid RCA (80%) , Unsuccessful deployment of the brachytherapy catheter due to lesion calcification and recoil following angioplasty  -plan for brachytherapy in a few weeks   -c/w asa, plavix, statin, bb     2. HTN  -bp stable  -c/w current meds     3. DM  -resume metformin 48 hours post cath    dvt ppx     DCP     plan discussed with ACP     DCP pt to f/u w Dr. Uribe upon dc

## 2021-04-15 NOTE — DISCHARGE NOTE PROVIDER - CARE PROVIDER_API CALL
Leonardo Alcaraz)  Cardiology  [address], [address]  [place] [address]  Phone: [phone]  Fax: [phone]  Follow Up Time: 1 week   Kimani Uribe  CARDIOVASCULAR DISEASE  [address], [address]  [place] [address]  Phone: [phone]  Fax: [phone]  Follow Up Time: 1 week

## 2021-04-15 NOTE — CONSULT NOTE ADULT - TIME BILLING
Patient seen and examined.  Agree with above NP note.  cv stable  cath site ok  no hematoma  no chest pain, dyspnea  d/c home today   reattempt poba/brachy with atherectomy in 2-4 weeks   f/u dr dean

## 2021-04-15 NOTE — DISCHARGE NOTE PROVIDER - HOSPITAL COURSE
80 year old female (no implantable devices) with a PMHx of CAD (hx of stents), PAD (recent RLE PAD stent 11/2020), HTN, DMT2 (last A1c unknown). C/o new SOB and chest discomfort with exertion. States initially thought it was indigestion took Tums and it resolved. Presented to Cardiologists office and found to have abnormal EKG. Recent hospital admission 3/2021 had PCI with POBA to pCx, discharged and planned for brachytherapy today.Patient with s/p recent POBA to severe ISR of the mid RCA  returns today for intracoronary brachytherapy. Unstable angina - CCS3.  HISTORY: The patient has a history of coronary artery disease. The patient  has hypertension, oral hypoglycemic-treated diabetes, and  medication-treated dyslipidemia.   Severe ISR of the mid RCA.  S/P succesful POBA to the lesion.  Unsuccessful deployment of the brachytherapy catheter due to lesion  calcification and recoil following angioplasty.  DIAGNOSTIC RECOMMENDATIONS: Continue current medical therapy.  Plan to repeat procedure with laser atherectomy in 1-2 weeks pending  clinical course.  INTERVENTIONAL 80 year old female (no implantable devices) with a PMHx of CAD (hx of stents), PAD (recent RLE PAD stent 11/2020), HTN, DMT2 (last A1c unknown). C/o new SOB and chest discomfort with exertion. States initially thought it was indigestion took Tums and it resolved. Presented to Cardiologists office and found to have abnormal EKG. Recent hospital admission 3/2021 had PCI with POBA to pRCA, discharged and planned for brachytherapy today.Patient with s/p recent POBA to severe ISR of the mid RCA; returns today for intracoronary brachytherapy for severe ISR of the mid RCA. S/P successful POBA to the lesion, but Unsuccessful deployment of the brachytherapy catheter due to lesion calcification and recoil following angioplasty. plan to  Continue current medical therapy. with plan to repeat procedure with laser atherectomy in 1-2 weeks pending  clinical course. hemodynamically stable and discharged home with cardiology follow up.

## 2021-05-23 ENCOUNTER — APPOINTMENT (OUTPATIENT)
Dept: DISASTER EMERGENCY | Facility: CLINIC | Age: 81
End: 2021-05-23

## 2021-05-24 ENCOUNTER — NON-APPOINTMENT (OUTPATIENT)
Age: 81
End: 2021-05-24

## 2021-05-24 LAB — SARS-COV-2 N GENE NPH QL NAA+PROBE: NOT DETECTED

## 2021-05-26 ENCOUNTER — OUTPATIENT (OUTPATIENT)
Dept: OUTPATIENT SERVICES | Facility: HOSPITAL | Age: 81
LOS: 1 days | End: 2021-05-26
Payer: MEDICARE

## 2021-05-26 PROCEDURE — 85027 COMPLETE CBC AUTOMATED: CPT

## 2021-05-26 PROCEDURE — 93005 ELECTROCARDIOGRAM TRACING: CPT

## 2021-05-26 PROCEDURE — 82962 GLUCOSE BLOOD TEST: CPT

## 2021-05-26 PROCEDURE — 80053 COMPREHEN METABOLIC PANEL: CPT

## 2021-05-26 RX ORDER — LIRAGLUTIDE 6 MG/ML
1 INJECTION SUBCUTANEOUS
Qty: 0 | Refills: 0 | DISCHARGE

## 2021-05-26 RX ORDER — GABAPENTIN 400 MG/1
1 CAPSULE ORAL
Qty: 0 | Refills: 0 | DISCHARGE

## 2021-05-26 RX ORDER — GLIMEPIRIDE 1 MG
2 TABLET ORAL
Qty: 0 | Refills: 0 | DISCHARGE

## 2021-06-10 DIAGNOSIS — I25.10 ATHEROSCLEROTIC HEART DISEASE OF NATIVE CORONARY ARTERY WITHOUT ANGINA PECTORIS: ICD-10-CM

## 2021-06-17 PROBLEM — I21.9 ACUTE MYOCARDIAL INFARCTION, UNSPECIFIED: Chronic | Status: ACTIVE | Noted: 2021-05-26

## 2021-06-17 PROBLEM — G62.9 POLYNEUROPATHY, UNSPECIFIED: Chronic | Status: ACTIVE | Noted: 2021-05-26

## 2021-06-17 PROBLEM — E11.9 TYPE 2 DIABETES MELLITUS WITHOUT COMPLICATIONS: Chronic | Status: ACTIVE | Noted: 2021-05-26

## 2021-06-23 ENCOUNTER — INPATIENT (INPATIENT)
Facility: HOSPITAL | Age: 81
LOS: 0 days | Discharge: HOME | DRG: 251 | End: 2021-06-24
Attending: INTERNAL MEDICINE | Admitting: INTERNAL MEDICINE
Payer: COMMERCIAL

## 2021-06-23 ENCOUNTER — TRANSCRIPTION ENCOUNTER (OUTPATIENT)
Age: 81
End: 2021-06-23

## 2021-06-23 VITALS
SYSTOLIC BLOOD PRESSURE: 138 MMHG | HEART RATE: 66 BPM | OXYGEN SATURATION: 98 % | RESPIRATION RATE: 15 BRPM | TEMPERATURE: 98 F | DIASTOLIC BLOOD PRESSURE: 85 MMHG

## 2021-06-23 DIAGNOSIS — I25.10 ATHEROSCLEROTIC HEART DISEASE OF NATIVE CORONARY ARTERY WITHOUT ANGINA PECTORIS: Chronic | ICD-10-CM

## 2021-06-23 DIAGNOSIS — I73.9 PERIPHERAL VASCULAR DISEASE, UNSPECIFIED: Chronic | ICD-10-CM

## 2021-06-23 DIAGNOSIS — I25.10 ATHEROSCLEROTIC HEART DISEASE OF NATIVE CORONARY ARTERY WITHOUT ANGINA PECTORIS: ICD-10-CM

## 2021-06-23 LAB
ANION GAP SERPL CALC-SCNC: 13 MMOL/L — SIGNIFICANT CHANGE UP (ref 5–17)
BUN SERPL-MCNC: 41 MG/DL — HIGH (ref 7–23)
CALCIUM SERPL-MCNC: 9.9 MG/DL — SIGNIFICANT CHANGE UP (ref 8.4–10.5)
CHLORIDE SERPL-SCNC: 104 MMOL/L — SIGNIFICANT CHANGE UP (ref 96–108)
CO2 SERPL-SCNC: 25 MMOL/L — SIGNIFICANT CHANGE UP (ref 22–31)
CREAT SERPL-MCNC: 1.08 MG/DL — SIGNIFICANT CHANGE UP (ref 0.5–1.3)
GLUCOSE SERPL-MCNC: 186 MG/DL — HIGH (ref 70–99)
HCT VFR BLD CALC: 39.4 % — SIGNIFICANT CHANGE UP (ref 34.5–45)
HGB BLD-MCNC: 12.6 G/DL — SIGNIFICANT CHANGE UP (ref 11.5–15.5)
MCHC RBC-ENTMCNC: 31 PG — SIGNIFICANT CHANGE UP (ref 27–34)
MCHC RBC-ENTMCNC: 32 GM/DL — SIGNIFICANT CHANGE UP (ref 32–36)
MCV RBC AUTO: 97 FL — SIGNIFICANT CHANGE UP (ref 80–100)
NRBC # BLD: 0 /100 WBCS — SIGNIFICANT CHANGE UP (ref 0–0)
PLATELET # BLD AUTO: 329 K/UL — SIGNIFICANT CHANGE UP (ref 150–400)
POTASSIUM SERPL-MCNC: 4.4 MMOL/L — SIGNIFICANT CHANGE UP (ref 3.5–5.3)
POTASSIUM SERPL-SCNC: 4.4 MMOL/L — SIGNIFICANT CHANGE UP (ref 3.5–5.3)
RBC # BLD: 4.06 M/UL — SIGNIFICANT CHANGE UP (ref 3.8–5.2)
RBC # FLD: 12.3 % — SIGNIFICANT CHANGE UP (ref 10.3–14.5)
SODIUM SERPL-SCNC: 142 MMOL/L — SIGNIFICANT CHANGE UP (ref 135–145)
WBC # BLD: 9.43 K/UL — SIGNIFICANT CHANGE UP (ref 3.8–10.5)
WBC # FLD AUTO: 9.43 K/UL — SIGNIFICANT CHANGE UP (ref 3.8–10.5)

## 2021-06-23 PROCEDURE — 93010 ELECTROCARDIOGRAM REPORT: CPT | Mod: 77

## 2021-06-23 RX ORDER — SIMVASTATIN 20 MG/1
40 TABLET, FILM COATED ORAL AT BEDTIME
Refills: 0 | Status: DISCONTINUED | OUTPATIENT
Start: 2021-06-23 | End: 2021-06-24

## 2021-06-23 RX ORDER — DEXTROSE 50 % IN WATER 50 %
15 SYRINGE (ML) INTRAVENOUS ONCE
Refills: 0 | Status: DISCONTINUED | OUTPATIENT
Start: 2021-06-23 | End: 2021-06-24

## 2021-06-23 RX ORDER — SODIUM CHLORIDE 9 MG/ML
1000 INJECTION, SOLUTION INTRAVENOUS
Refills: 0 | Status: DISCONTINUED | OUTPATIENT
Start: 2021-06-23 | End: 2021-06-24

## 2021-06-23 RX ORDER — METFORMIN HYDROCHLORIDE 850 MG/1
1 TABLET ORAL
Qty: 0 | Refills: 0 | DISCHARGE

## 2021-06-23 RX ORDER — LOSARTAN POTASSIUM 100 MG/1
100 TABLET, FILM COATED ORAL DAILY
Refills: 0 | Status: DISCONTINUED | OUTPATIENT
Start: 2021-06-23 | End: 2021-06-24

## 2021-06-23 RX ORDER — INSULIN LISPRO 100/ML
VIAL (ML) SUBCUTANEOUS AT BEDTIME
Refills: 0 | Status: DISCONTINUED | OUTPATIENT
Start: 2021-06-23 | End: 2021-06-24

## 2021-06-23 RX ORDER — ASPIRIN/CALCIUM CARB/MAGNESIUM 324 MG
81 TABLET ORAL DAILY
Refills: 0 | Status: DISCONTINUED | OUTPATIENT
Start: 2021-06-23 | End: 2021-06-24

## 2021-06-23 RX ORDER — METOPROLOL TARTRATE 50 MG
50 TABLET ORAL THREE TIMES A DAY
Refills: 0 | Status: DISCONTINUED | OUTPATIENT
Start: 2021-06-23 | End: 2021-06-24

## 2021-06-23 RX ORDER — CLOPIDOGREL BISULFATE 75 MG/1
75 TABLET, FILM COATED ORAL DAILY
Refills: 0 | Status: DISCONTINUED | OUTPATIENT
Start: 2021-06-23 | End: 2021-06-24

## 2021-06-23 RX ORDER — GLUCAGON INJECTION, SOLUTION 0.5 MG/.1ML
1 INJECTION, SOLUTION SUBCUTANEOUS ONCE
Refills: 0 | Status: DISCONTINUED | OUTPATIENT
Start: 2021-06-23 | End: 2021-06-24

## 2021-06-23 RX ORDER — DEXTROSE 50 % IN WATER 50 %
12.5 SYRINGE (ML) INTRAVENOUS ONCE
Refills: 0 | Status: DISCONTINUED | OUTPATIENT
Start: 2021-06-23 | End: 2021-06-24

## 2021-06-23 RX ORDER — DEXTROSE 50 % IN WATER 50 %
25 SYRINGE (ML) INTRAVENOUS ONCE
Refills: 0 | Status: DISCONTINUED | OUTPATIENT
Start: 2021-06-23 | End: 2021-06-24

## 2021-06-23 RX ORDER — INSULIN LISPRO 100/ML
VIAL (ML) SUBCUTANEOUS
Refills: 0 | Status: DISCONTINUED | OUTPATIENT
Start: 2021-06-23 | End: 2021-06-24

## 2021-06-23 RX ORDER — GABAPENTIN 400 MG/1
200 CAPSULE ORAL AT BEDTIME
Refills: 0 | Status: DISCONTINUED | OUTPATIENT
Start: 2021-06-23 | End: 2021-06-24

## 2021-06-23 RX ORDER — HYDROCHLOROTHIAZIDE 25 MG
25 TABLET ORAL DAILY
Refills: 0 | Status: DISCONTINUED | OUTPATIENT
Start: 2021-06-23 | End: 2021-06-24

## 2021-06-23 RX ADMIN — GABAPENTIN 200 MILLIGRAM(S): 400 CAPSULE ORAL at 21:31

## 2021-06-23 RX ADMIN — Medication 50 MILLIGRAM(S): at 21:36

## 2021-06-23 RX ADMIN — Medication 4: at 21:31

## 2021-06-23 RX ADMIN — SIMVASTATIN 40 MILLIGRAM(S): 20 TABLET, FILM COATED ORAL at 21:31

## 2021-06-23 RX ADMIN — Medication 1: at 16:43

## 2021-06-23 RX ADMIN — Medication 50 MILLIGRAM(S): at 14:19

## 2021-06-23 RX ADMIN — LOSARTAN POTASSIUM 100 MILLIGRAM(S): 100 TABLET, FILM COATED ORAL at 16:44

## 2021-06-23 NOTE — DISCHARGE NOTE PROVIDER - PROVIDER TOKENS
PROVIDER:[TOKEN:[[id number]]] PROVIDER:[TOKEN:[[id number]],FOLLOWUP:[2 weeks],ESTABLISHEDPATIENT:[T]]

## 2021-06-23 NOTE — DISCHARGE NOTE PROVIDER - NSDCMRMEDTOKEN_GEN_ALL_CORE_FT
aspirin 81 mg oral tablet: 1 tab(s) orally once a day  gabapentin 100 mg oral capsule: 2 cap(s) orally once a day (at bedtime)  glimepiride 4 mg oral tablet: 1 tab(s) orally 2 times a day  losartan-hydrochlorothiazide 100 mg-25 mg oral tablet: 1 tab(s) orally once a day  metFORMIN 1000 mg oral tablet: 1 tab(s) orally 2 times a day  Hold for 2 days, restart on Saturday 6/26   metoprolol tartrate 50 mg oral tablet: 1 tab(s) orally 3 times a day  Plavix 75 mg oral tablet: 1 tab(s) orally once a day MDD:1  simvastatin: 40 milligram(s) orally once a day  Toujeo Max SoloStar 300 units/mL subcutaneous solution: 6 unit(s) subcutaneous 2 times a day as directed  Victoza 18 mg/3 mL subcutaneous solution: 0.6 milligram(s) subcutaneous once a day

## 2021-06-23 NOTE — DISCHARGE NOTE PROVIDER - NSDCCPCAREPLAN_GEN_ALL_CORE_FT
PRINCIPAL DISCHARGE DIAGNOSIS  Diagnosis: CAD (coronary artery disease)  Assessment and Plan of Treatment: Low salt, low fat diet.   Weight management.   Take medications as prescribed.    No smoking.  Follow up appointments with your doctor(s)  as instructed.        SECONDARY DISCHARGE DIAGNOSES  Diagnosis: HTN (hypertension)  Assessment and Plan of Treatment: Continue with your blood pressure medications; eat a heart healthy diet with low salt diet; exercise regularly (consult with your physician or cardiologist first); maintain a heart healthy weight; if you smoke - quit (A resource to help you stop smoking is the [facility] – phone number [phone].); include healthy ways to manage stress. Continue to follow with your primary care physician or cardiologist.    Diagnosis: HLD (hyperlipidemia)  Assessment and Plan of Treatment: Continue with your cholesterol medications. Eat a heart healthy diet that is low in saturated fats and salt, and includes whole grains, fruits, vegetables and lean protein; exercise regularly (consult with your physician or cardiologist first); maintain a heart healthy weight; if you smoke - quit (A resource to help you stop smoking is the [facility] – phone number [phone].). Continue to follow with your primary physician or cardiologist.    Diagnosis: DM (diabetes mellitus)  Assessment and Plan of Treatment: Continue to follow up with your primary care MD or your endocrinologist. Follow a heart healthy diabetic diet. Call your MD if your sugar level is greater than 250mg/dL or less than 100mg/dL on 2 occasions. Know signs of low blood sugar, such as: dizziness, shakiness, sweating, confusion, hunger, nervousness-drink 4 ounces apple juice call your doctor. Know early signs of high blood sugar; frequent urination,  inceased thirst, blurry vision, fatigue, headache. Follow with other practitioners to care for your diabetes, such as ophthalmologist and podiatrist every 3-6months.

## 2021-06-23 NOTE — H&P CARDIOLOGY - HISTORY OF PRESENT ILLNESS
81 year old female (no implantable devices) with a PMHx of CAD (hx of stents), PAD (recent RLE PAD stent 11/2020), HTN, DMT2 (last A1c unknown). Patient had an unsuccessful attempt at brachytherapy due to severely calcified lesion in April. Patient presents today for another brachytherapy attempt of the RCA. Patient denies any cardiac symptoms today.    COVID vaccinated    Cards: Leonardo Alcaraz

## 2021-06-23 NOTE — DISCHARGE NOTE PROVIDER - NSDCCPTREATMENT_GEN_ALL_CORE_FT
PRINCIPAL PROCEDURE  Procedure: Percutaneous transluminal coronary angioplasty (PTCA) with intravascular brachytherapy  Findings and Treatment:

## 2021-06-23 NOTE — H&P CARDIOLOGY - PMH
Coronary artery disease    Diabetes    Hyperlipidemia    Hypertension    Myocardial infarct    Peripheral artery disease    Peripheral neuropathy

## 2021-06-23 NOTE — DISCHARGE NOTE PROVIDER - NSDCFUADDINST_GEN_ALL_CORE_FT
Wound Care:   the day AFTER your procedure remove bandage GENTLY, and clean using  mild soap and warm water stream, pat dry. Leave the area OPEN to air. YOU MAY SHOWER 24 hour after procedure.   DO NOT apply lotions, creams, ointments, powder, perfumes to the puncture site.  DO NOT SOAK the site for 1 week (no tub bath, no swimming, etc.)  Check  your groin and daily. A small amount of bruising and sourness are normal.     ACTIVITY: for 24 hours   - DO NOT DRIVE  - DO NOT make any important decisions or sign legal documents   - DO NOT operate heavy duty machineries   - you may resume sexual activity in 48 hours, unless otherwise instructed by your cardiologist       If your procedure was done through the [facility]: for the NEXT 5 DAYS  - Limit climbing stairs, DO NOT soak in bathtub or pool  - no strenuous activities, pushing, pulling, straining  - Do not lift anything 10lbs or heavier     MEDICATION:   Take your medications as explained (see discharge paperwork)   If you received a STENT, you will be taking antiplatelet medications to KEEP YOUR STENT OPEN (Aspirin, Plavix).  Take as prescribed, DO NOT STOP taking them without consulting with your cardiologist first.     Follow heart healthy diet recommended by your doctor, if you smoke STOP SMOKING (may call [phone] for center of tobacco control if you need assistance)     CALL your doctor to make appointment in 2 WEEKS     ***CALL YOUR DOCTOR***  if you experience: fever, chills, body aches, or severe pain, swelling, redness, heat or yellow discharge at incision site  If you experience Bleeding or excruciating pain at the procedural site, swelling (golf ball size) at your procedural site  If you experience CHEST PAIN  If you experience extremity numbness, tingling, temperature change (of your procedural site)   If you are unable to reach your doctor, you may contact:   -Cardiology Office at [facility] at [phone] or [facility] [phone]- [facility] [phone]

## 2021-06-23 NOTE — DISCHARGE NOTE PROVIDER - HOSPITAL COURSE
81 year old female (no implantable devices) with a PMHx of CAD (hx of stents), PAD (recent RLE PAD stent 11/2020), HTN, DMT2 (last A1c unknown). Patient had an unsuccessful attempt at brachytherapy due to severely calcified lesion in April. Patient presents today for another brachytherapy attempt of the RCA. Patient denies any cardiac symptoms today.  S/p brachytherapy to the RCA,  RFA area benign, paln to discharge 6/24 with aspirin and plavix  and statin follow up with Dr. Alcaraz in 1-2 weeks.

## 2021-06-24 ENCOUNTER — TRANSCRIPTION ENCOUNTER (OUTPATIENT)
Age: 81
End: 2021-06-24

## 2021-06-24 VITALS
HEART RATE: 68 BPM | DIASTOLIC BLOOD PRESSURE: 50 MMHG | RESPIRATION RATE: 17 BRPM | SYSTOLIC BLOOD PRESSURE: 118 MMHG | OXYGEN SATURATION: 96 % | TEMPERATURE: 98 F

## 2021-06-24 PROCEDURE — C1769: CPT

## 2021-06-24 PROCEDURE — C1728: CPT

## 2021-06-24 PROCEDURE — C9607: CPT

## 2021-06-24 PROCEDURE — 93454 CORONARY ARTERY ANGIO S&I: CPT | Mod: 59

## 2021-06-24 PROCEDURE — C1725: CPT

## 2021-06-24 PROCEDURE — 80048 BASIC METABOLIC PNL TOTAL CA: CPT

## 2021-06-24 PROCEDURE — 93005 ELECTROCARDIOGRAM TRACING: CPT

## 2021-06-24 PROCEDURE — 85027 COMPLETE CBC AUTOMATED: CPT

## 2021-06-24 PROCEDURE — 99152 MOD SED SAME PHYS/QHP 5/>YRS: CPT

## 2021-06-24 PROCEDURE — 92920 PRQ TRLUML C ANGIOP 1ART&/BR: CPT | Mod: RC

## 2021-06-24 PROCEDURE — 93458 L HRT ARTERY/VENTRICLE ANGIO: CPT

## 2021-06-24 PROCEDURE — 99153 MOD SED SAME PHYS/QHP EA: CPT

## 2021-06-24 PROCEDURE — 93799 UNLISTED CV SVC/PROCEDURE: CPT

## 2021-06-24 PROCEDURE — 82962 GLUCOSE BLOOD TEST: CPT

## 2021-06-24 PROCEDURE — 92974 CATH PLACE CARDIO BRACHYTX: CPT

## 2021-06-24 PROCEDURE — C1887: CPT

## 2021-06-24 RX ORDER — SODIUM CHLORIDE 9 MG/ML
1000 INJECTION, SOLUTION INTRAVENOUS
Refills: 0 | Status: DISCONTINUED | OUTPATIENT
Start: 2021-06-24 | End: 2021-06-24

## 2021-06-24 RX ORDER — INSULIN LISPRO 100/ML
VIAL (ML) SUBCUTANEOUS AT BEDTIME
Refills: 0 | Status: DISCONTINUED | OUTPATIENT
Start: 2021-06-24 | End: 2021-06-24

## 2021-06-24 RX ORDER — INSULIN LISPRO 100/ML
VIAL (ML) SUBCUTANEOUS
Refills: 0 | Status: DISCONTINUED | OUTPATIENT
Start: 2021-06-24 | End: 2021-06-24

## 2021-06-24 RX ORDER — DEXTROSE 50 % IN WATER 50 %
15 SYRINGE (ML) INTRAVENOUS ONCE
Refills: 0 | Status: DISCONTINUED | OUTPATIENT
Start: 2021-06-24 | End: 2021-06-24

## 2021-06-24 RX ORDER — DEXTROSE 50 % IN WATER 50 %
25 SYRINGE (ML) INTRAVENOUS ONCE
Refills: 0 | Status: DISCONTINUED | OUTPATIENT
Start: 2021-06-24 | End: 2021-06-24

## 2021-06-24 RX ORDER — DEXTROSE 50 % IN WATER 50 %
12.5 SYRINGE (ML) INTRAVENOUS ONCE
Refills: 0 | Status: DISCONTINUED | OUTPATIENT
Start: 2021-06-24 | End: 2021-06-24

## 2021-06-24 RX ORDER — INSULIN GLARGINE 100 [IU]/ML
4 INJECTION, SOLUTION SUBCUTANEOUS
Refills: 0 | Status: DISCONTINUED | OUTPATIENT
Start: 2021-06-24 | End: 2021-06-24

## 2021-06-24 RX ORDER — GLUCAGON INJECTION, SOLUTION 0.5 MG/.1ML
1 INJECTION, SOLUTION SUBCUTANEOUS ONCE
Refills: 0 | Status: DISCONTINUED | OUTPATIENT
Start: 2021-06-24 | End: 2021-06-24

## 2021-06-24 RX ADMIN — Medication 6: at 11:15

## 2021-06-24 RX ADMIN — Medication 50 MILLIGRAM(S): at 05:43

## 2021-06-24 RX ADMIN — Medication 25 MILLIGRAM(S): at 05:44

## 2021-06-24 RX ADMIN — CLOPIDOGREL BISULFATE 75 MILLIGRAM(S): 75 TABLET, FILM COATED ORAL at 05:45

## 2021-06-24 RX ADMIN — Medication 2: at 07:13

## 2021-06-24 RX ADMIN — LOSARTAN POTASSIUM 100 MILLIGRAM(S): 100 TABLET, FILM COATED ORAL at 07:43

## 2021-06-24 RX ADMIN — Medication 81 MILLIGRAM(S): at 05:46

## 2021-06-24 RX ADMIN — INSULIN GLARGINE 4 UNIT(S): 100 INJECTION, SOLUTION SUBCUTANEOUS at 01:01

## 2021-06-24 NOTE — CONSULT NOTE ADULT - ASSESSMENT
81 year old female (no implantable devices) with a PMHx of CAD (hx of stents), PAD (recent RLE PAD stent 11/2020), HTN, DMT2 (last A1c unknown). Patient had an unsuccessful attempt at brachytherapy due to severely calcified lesion in April. Patient presented for another brachytherapy attempt of the RCA, S/p brachytherapy to the RCA.     #CAD s/p Stents, s/p brachytherapy   -6/23 - successful angioplasty to the mid RCA followed by intracoronary brachytherapy   -cv stable, no cp/sob - NAVARRETE improving - no ADHF on exam   -c/w asa , plavix, statin, bb     #htn  -stable  -c/w bb, losartan, hydrochlorothiazide.      plan for dc today - outpt f/u with Dr. Kimani Uribe in 1-2 weeks.

## 2021-06-24 NOTE — CONSULT NOTE ADULT - TIME BILLING
Agree with above NP note.  cv stable post sonali/poba to mid rca lesion  no chest pain  cont dap  d/c home today   f/u dr breanna dean

## 2021-06-24 NOTE — DISCHARGE NOTE NURSING/CASE MANAGEMENT/SOCIAL WORK - PATIENT PORTAL LINK FT
You can access the FollowMyHealth Patient Portal offered by [facility] by registering at the following website: [email or web address]. By joining [facility]’s FollowMyHealth portal, you will also be able to view your health information using other applications (apps) compatible with our system.

## 2021-06-24 NOTE — CONSULT NOTE ADULT - SUBJECTIVE AND OBJECTIVE BOX
CARDIOLOGY CONSULT - Dr. Alcaraz     CHIEF COMPLAINT: chest pain    HPI: 81 year old female (no implantable devices) with a PMHx of CAD (hx of stents), PAD (recent RLE PAD stent 2020), HTN, DMT2 (last A1c unknown). Patient had an unsuccessful attempt at brachytherapy due to severely calcified lesion in April. Patient presented for another brachytherapy attempt of the RCA.   S/p brachytherapy to the RCA,  RFA area benign. Patient denies any chest pain, dyspnea, palpitations, cough, syncope, edema, exertional symptoms, nausea, abdominal pain, fever, chills,  or rash.        COVID vaccinated    Cards: Leonardo Alcaraz   (2021 09:40)      PAST MEDICAL & SURGICAL HISTORY:  Hypertension    Coronary artery disease    Hyperlipidemia    Peripheral artery disease    Myocardial infarct    Peripheral neuropathy    Diabetes    PAD (peripheral artery disease)    CAD (coronary artery disease)  stents            PREVIOUS DIAGNOSTIC TESTING:    [ ] Echocardiogram: < from: Cardiac Cath Lab - Adult (21 @ 11:46) >    [facility]  Department of Cardiology  [address] [place] [address]  [phone]  Cath Lab Report -- Comprehensive Report  Patient: LANIE BERTRAND  Study date: 2021  Account number: [id number]  MR number: [id number]  : 1940  Gender: Female  Race: W  Case Physician(s):  PAT Laboy M.D.  Fellow:  Igor Allison MD  Referring Physician:  Kimani Uribe M.D.  INDICATIONS: Patient with prior history of severe ISR of the RCA s/p POBA  who returns today for intracoronary brachytherapy. Unstable angina - CCS3.  HISTORY: The patient has a history of coronary artery disease. The patient  has hypertension and oral hypoglycemic-treated diabetes.  PROCEDURE:  --  Left coronary angiography.  --  Sonosite - Diagnostic.  --  Intra-Coronary Brachytherapy.  --  Intravascular Lithotripsy.  --  Intervention on mid RCA: brachytherapy.  TECHNIQUE: The risks and alternatives of the procedures and conscious  sedation were explained to the patient and informed consent was obtained.  Cardiac catheterization performed electively.  Local anesthetic given. Right femoral artery access. Left coronary artery  angiography. The vessel was injected utilizing a catheter. Sonosite -  Diagnostic. RADIATION EXPOSURE: 34.7 min. A brachytherapy was performed on  the 80 % lesion in the mid RCA. Following intervention there was a 1 %  residual stenosis. Vessel setup was performed. A 6FR AL.75 LAUNCHER  guiding catheter was used to intubate thevessel. Vessel setup was  performed. A ARSENIO [id number] wire was used to cross the lesion. Balloon  angioplasty was performed, using a 2.5 X 20 EUPHORA balloon, with 10  inflations and a maximum inflation pressure of 20 jony. Balloon angioplasty  was performed, using a 3.00 X 20 NC APEX balloon, with 5 inflations and a  maximum inflation pressure of 20 jony. Balloon angioplasty was performed,  using a 3.50 X 20 EUPHORA NC balloon, with 3 inflations and a maximum  inflation pressure of 20 jony. Balloonangioplasty was performed, with 5  inflations. Balloon angioplasty was performed, using a 3.50 X 20 EUPHORA  NC balloon, with 1 inflations. Intracoronary brachytherapy was performed.  A total dose of 0 Gray was administered to the coronary segment.  Intra-Coronary Brachytherapy. Intravascular Lithotripsy.  CONTRAST GIVEN: Omnipaque 45 ml.  MEDICATIONS GIVEN: Fentanyl, 25 mcg, IV. Midazolam, 1 mg, IV. Labetalol  (Trandate), 20 mg, IV. Heparin, 7500 units, IV. Heparin, 2000 units, IV.  CORONARY VESSELS: The coronary circulation is right dominant.  LM:   --  LM: Angiography showed minor luminal irregularities with no flow  limiting lesions.  LAD:   --  LAD: Angiography showed minor luminal irregularities with no  flow limiting lesions.  CX:   -- Circumflex: Angiography showed minor luminal irregularities with  no flow limiting lesions.  RCA:   --  Mid RCA: There was a 90 % stenosis.  COMPLICATIONS: There were no complications.  DIAGNOSTIC IMPRESSIONS: Patient s/p successful angioplasty to the mid RCA  followed by intracoronary brachytherapy.  DIAGNOSTIC RECOMMENDATIONS: Continue dual antiplatelet therapy.  Continue routine post-cath care.  INTERVENTIONAL IMPRESSIONS: Patient s/p successful angioplasty to the mid  RCA followed by intracoronary brachytherapy.  INTERVENTIONAL RECOMMENDATIONS: Continue dual antiplatelet therapy.  Continue routine post-cath care.  DISPOSITION: The patient left the catheterization laboratory in stable  condition.  Prepared and signed by  Leonardo Alcaraz M.D.    < end of copied text >    [ ]  Catheterization:   [ ] Stress Test:  	    MEDICATIONS:  HOME MEDICATIONS:  aspirin 81 mg oral tablet: 1 tab(s) orally once a day (:46)  gabapentin 100 mg oral capsule: 2 cap(s) orally once a day (at bedtime) (:46)  glimepiride 4 mg oral tablet: 1 tab(s) orally 2 times a day (:46)  losartan-hydrochlorothiazide 100 mg-25 mg oral tablet: 1 tab(s) orally once a day (2021 09:46)  metFORMIN 1000 mg oral tablet: 1 tab(s) orally 2 times a day  Hold for 2 days, restart on   (2021 20:48)  metoprolol tartrate 50 mg oral tablet: 1 tab(s) orally 3 times a day (:46)  simvastatin: 40 milligram(s) orally once a day (:46)  Toujeo Max SoloStar 300 units/mL subcutaneous solution: 6 unit(s) subcutaneous 2 times a day as directed (2021 09:46)  Victoza 18 mg/3 mL subcutaneous solution: 0.6 milligram(s) subcutaneous once a day (:46)      MEDICATIONS  (STANDING):  aspirin enteric coated 81 milliGRAM(s) Oral daily  clopidogrel Tablet 75 milliGRAM(s) Oral daily  dextrose 40% Gel 15 Gram(s) Oral once  dextrose 5%. 1000 milliLiter(s) (50 mL/Hr) IV Continuous <Continuous>  dextrose 5%. 1000 milliLiter(s) (100 mL/Hr) IV Continuous <Continuous>  dextrose 50% Injectable 25 Gram(s) IV Push once  dextrose 50% Injectable 12.5 Gram(s) IV Push once  dextrose 50% Injectable 25 Gram(s) IV Push once  gabapentin 200 milliGRAM(s) Oral at bedtime  glucagon  Injectable 1 milliGRAM(s) IntraMuscular once  hydrochlorothiazide 25 milliGRAM(s) Oral daily  insulin glargine Injectable (LANTUS) 4 Unit(s) SubCutaneous two times a day  insulin lispro (ADMELOG) corrective regimen sliding scale   SubCutaneous three times a day before meals  insulin lispro (ADMELOG) corrective regimen sliding scale   SubCutaneous at bedtime  losartan 100 milliGRAM(s) Oral daily  metoprolol tartrate 50 milliGRAM(s) Oral three times a day  simvastatin 40 milliGRAM(s) Oral at bedtime          FAMILY HISTORY:  Family history of stroke (Sibling)        SOCIAL HISTORY:    [x ] Non-smoker  [ ] Smoker  [ ] Alcohol    Allergies    No Known Allergies    Intolerances    	    REVIEW OF SYSTEMS:  CONSTITUTIONAL: No fever, weight loss, or fatigue  EYES: No eye pain, visual disturbances, or discharge  ENMT:  No difficulty hearing, tinnitus, vertigo; No sinus or throat pain  NECK: No pain or stiffness  RESPIRATORY: No cough, wheezing, chills or hemoptysis; No Shortness of Breath  CARDIOVASCULAR: No chest pain, palpitations, passing out, dizziness, or leg swelling  GASTROINTESTINAL: No abdominal or epigastric pain. No nausea, vomiting, or hematemesis; No diarrhea or constipation. No melena or hematochezia.  GENITOURINARY: No dysuria, frequency, hematuria, or incontinence  NEUROLOGICAL: No headaches, memory loss, loss of strength, numbness, or tremors  SKIN: No itching, burning, rashes, or lesions   	    [x ] All others negative	  [ ] Unable to obtain    PHYSICAL EXAM:  T(C): 36.7 (21 @ 09:01), Max: 36.9 (21 @ 19:05)  HR: 64 (21 @ 09:01) (57 - 71)  BP: 124/49 (21 @ 09:01) (100/53 - 151/46)  RR: 17 (21 @ 09:01) (16 - 17)  SpO2: 97% (21 @ 09:01) (92% - 100%)  Wt(kg): --  I&O's Summary    2021 07:  -  2021 07:00  --------------------------------------------------------  IN: 320 mL / OUT: 700 mL / NET: -380 mL    :  -  2021 12:19  --------------------------------------------------------  IN: 480 mL / OUT: 0 mL / NET: 480 mL        Appearance: Normal	  Psychiatry: A & O x 3, Mood & affect appropriate  HEENT:   Normal oral mucosa, PERRL, EOMI	  Lymphatic: No lymphadenopathy  Cardiovascular: Normal S1 S2,RRR, No JVD, No murmurs  Respiratory: Lungs clear to auscultation	  Gastrointestinal:  Soft, Non-tender, + BS	  Skin: No rashes, No ecchymoses, No cyanosis	  Neurologic: Non-focal  Extremities: Normal range of motion, No clubbing, cyanosis or edema  Vascular: Peripheral pulses palpable 2+ bilaterally    TELEMETRY: nsr 	    ECG:  	  RADIOLOGY:  OTHER: 	  	  LABS:	 	    CARDIAC MARKERS:                                  12.6   9.43  )-----------( 329      ( 2021 09:49 )             39.4     06-    142  |  104  |  41<H>  ----------------------------<  186<H>  4.4   |  25  |  1.08    Ca    9.9      2021 09:49        proBNP:   Lipid Profile:   HgA1c:   TSH:

## 2021-06-24 NOTE — CHART NOTE - NSCHARTNOTEFT_GEN_A_CORE
RFA sheath removed , the 6 Fr sheath was removed and manual pressure held for 18 minutes. HEMOSTASIS achieved. DSD applied ( tegaderm and sterile guaze)   No hematoma or bleeding. DP pulse 2+/2+. Bedrest for 2 hrs . Vitals ordered for q15 x 4 , q 30 min x 3 , q 1 hr x 1 .
Pt seen by Dr. Alcaraz's NP and cleared pt for d/c home.

## 2022-03-04 NOTE — PATIENT PROFILE ADULT - ARRIVAL FROM
[facility]    3/7/2022      Re:  DANDY LOMAX  :  1954    Dear Sami:    It was my pleasure to see your patient, Dandy Lomax, in the [facility] today.      As you know, we follow him for subclinical coronary artery disease and dyslipidemia.     The patient has been through a previous cardiac workup by Dr. Santo Tucker at [facility].  A coronary calcium score was obtained in  which revealed Agatston score 174 with significant plaquing of his LAD.  This prompted a stress study which was equivocal for ischemia.  This was followed by a diagnostic left heart catheterization in 2016 which demonstrated moderate nonobstructive Mid LAD disease with negative FFR.  Medical management was indicated.    The patient has been on a prevention program over the past several years.  He is on Lipitor 10 mg daily.  His recent lipid panel dated 2021 revealed a total cholesterol of 167, triglycerides 33, HDL 78, LDL 81.  This is an excellent response to Lipitor 10 mg daily.    On his advanced lipid panel in  his bioinflammatory markers including HSCRP and LPPLA-2 were normal.  His endothelial function was normal.  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  He has a prothrombin mutation which increases his risk for venous thrombosis.  His homocystine level is elevated at 11.  There was no evidence of insulin resistance.  His ferritin levels are low.  His omega-3 index was10.9.    We reviewed his recent [facility] advanced lipid panel as well as his medications and supplements in detail and are making no substantial changes today.     The patient reports he had recent laminectomy which has decreased his physical activity tolerance lately, though he still tries to exercise 3 or 4 times a week. He reports eating lots of fruits, vegetables, nuts, and flaxseed oil.    His EKG today demonstrates an ECG finding of right BBB this  visit.  In reviewing the records he had an incomplete right bundle branch block in 2018 and then a complete right bundle branch block in 2019.  He underwent a stress echo which was negative for microischemia or scar.  In 2020 his EKG reverted back to normal conduction pattern.  We discussed this finding in detail, it is not cause for immediate concern as he is asymptomatic. We will get a repeat ECHO to confirm cardiac function.    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    CURRENT MEDICATIONS:   Current Outpatient Medications   Medication Instructions   • alpha lipoic acid 200 mg, oral, Daily   • aspirin (ASPIR-81) 81 mg, oral, Daily   • atorvastatin (LIPITOR) 10 mg, oral, Daily   • cholecalciferol, vitamin D3, (VITAMIN D3) 2,000 unit tablet oral, Daily   • coenzyme Q10 (CO Q-10) 200 mg, oral, Daily   • escitalopram (LEXAPRO) 10 mg, oral, Daily   • Lactobac no.41-Bifidobact no.7 (PROBIOTIC-10) 70 mg (3 billion cell) capsule oral, Daily   • losartan (COZAAR) 50 mg, oral, Daily   • magnesium 250 mg, oral, 2 times daily   • MULTIVIT WITH  Home MINERALS/LUTEIN (MULTIVITAMIN 50 PLUS ORAL) oral, Daily   • omega-3 fatty acids 1,000 mg capsule oral, Daily, Two capsules daily    • pantoprazole (PROTONIX) 40 mg, oral, Daily   • resveratroL 250 mg, oral, Daily   • zolpidem (AMBIEN) 5 mg, oral, Nightly PRN       SUPPLEMENTS:  Multivitamin, omega-3 fish oil, vitamin-D, Reservitol, methyl-B essentials, probiotic, coenzyme-Q10, lipoic acid, calcium.  We reviewed his supplements in detail today.    ALLERGIES:  No known drug allergies.    FAMILY HISTORY:  Mother with hypertension, congestive heart failure.  Father with myocardial infarction, hypertension.    SOCIAL HISTORY:  He is .  His wife's name is Lakeshia Presley.  He has one child.  He is an , self-employed.  He does not smoke.  Rare alcohol.  Regular exercise.    REVIEW OF SYSTEMS:  The patient has snoring, uses nasal CPAP, so he does have sleep apnea, GERD, reflux.  He has been on proton pump inhibitors for 15 years.  The remainder of his 12 point review of systems is negative.    PHYSICAL EXAMINATION:   Last is a 67-year-old male no acute distress    Vitals:    03/07/22 0958   BP: 118/72   Pulse: 76   SpO2: 98%        Wt Readings from Last 3 Encounters:   03/07/22 73.5 kg (162 lb)   02/10/22 73.9 kg (163 lb)   11/30/21 72.6 kg (160 lb)       HEENT:  Unremarkable.  Neck:  Supple, no JVD.  Lungs:  Clear to auscultation and percussion.  Cardiac:  Regular rate and rhythm.  Abdomen:  Soft, bowel sounds present, no organomegaly.  Extremities:  No edema, pulses intact.  Skin:  Warm and dry.  Neuro:  Alert and oriented X 3.    LABORATORY DATA:      EKG: Normal sinus rhythm with a right bundle branch block.  The patient has had intermittent right bundle branch block in the past.    Coronary Calcium score 2016 at [place]: 174 predominantly involving the mid LAD.       Cardiac catheterization 2016:  Mild to moderate non-obstructive LAD disease. FFR LAD negative.  Minor luminal  "irregularities LCX and RCA.  Normal LV function EF 60%.    Stress echo November 2019:  · Stress echocardiogram reveals discordant results with borderline abnormal ecg but normal wall motion.  · Response to Stress: A horizontal ST segment depression of 1 mm was noted during stress in the V5 and V6 leads, beginning at 11 minutes 50 seconds of stress, and returning to baseline by 1 min of recovery.  · Post-stress echocardiogram does not meet criteria for ischemia. All ventricular walls become hyperdynamic and the ejection fraction improves.    [facility] lipid panel April 2019 in \"media\":  Total cholesterol 153 LDL 72 HDL 77 trig, 30 APO B 72 LP(a) 64  Inflammation panel: Normal  Endothelial function panel: Normal  Diabetes panel: Normal  Metabolic panel: Normal.  Vitamin D 66 homocystine 11  Omega-3 index 10.6  Sterol panel: Normal  Renal function panel: GFR 74    Basic Lipid Panels:  Component      Latest Ref Rng & Units 10/31/2019 1/27/2020 7/29/2021   Triglycerides      0 - 149 mg/dL 37 30 33   LDL Calculated      0 - 99 mg/dL 76 71 81   Cholesterol      100 - 199 mg/dL 162 159 167   VLDL Cholesterol Timmy      5 - 40 mg/dL  6 8   HDL      >39 mg/dL 75 82 78     [facility]  12/21/21:  Lipid panel: Total cholesterol 162, LDL 69, HDL 76, triglycerides 31. ApoB 60, Lp(a) 25.  Inflammation panel: HS CRP 0.4 LP MIGUEL ÁNGEL 2 129  Sterol balance panel: Hyper absorber of cholesterol  Diabetes panel: Normal hemoglobin A1c 5.5 insulin 6 Kacy IR 1.6 LP IR 13  Fatty acid panel: EPA 68 DHA 80 mcg/mL  Metabolic panel: Uric acid 5.2 homocysteine 10.4  Hormone panel: TSH 3.6 vitamin D 63    IMPRESSIONS/RECOMMENDATIONS:  1. Coronary artery disease.  The patient has had prior cardiac catheterization which demonstrated nonobstructive LAD coronary artery disease in 2016.  His recent stress echo November 2019 demonstrated normal wall motion.  He did have borderline EKG changes at peak exercise. The patient needs an aggressive risk " factor modification program.  He will continue baby aspirin.   2. Dyslipidemia.  Continue Lipitor.  He has reached goal.  3. 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  The patient is at increased risk for progressive atherosclerosis.  4. Prothrombin mutation.  The patient is at increased risk of DVT.  5. Hyperhomocystinemia.  The patient should continue with B-complex and L5 methyl folate.  6. Obstructive sleep apnea.  The patient will continue nasal CPAP.  7. Borderline hypertension.  Patient will monitor his blood pressures at home.  Our goal is a blood pressure less than 130/80.  His bp today is He continues on losartan 50 mg daily.  8. Anxiety disorder.  9. GERD and irritable bowel syndrome.  Stable  10.        New right bundle branch block.  The patient has demonstrated an incomplete right bundle branch block since 2018.  He had a complete right bundle branch block in 2019.  This prompted a stress echo which was negative for ischemia in 2019.  His EKG in 2020 reverted to normal sinus rhythm without bundle branch block.  We recommend that he undergo a repeat echocardiogram at this time.    Summary: Dandy is demonstrating cardiovascular stability. .  We reviewed his prevention program in detail.  We are making no substantial changes today.    We reviewed his EKGs dating back to 2016.  He had incomplete right bundle branch block in 2018.  He subsequently developed complete bundle right bundle branch block in 2019.  A stress echo in 2019 was negative for ischemia.  In 2020 he reverted back to normal conduction.  Today his EKG demonstrates right bundle branch block.  I suspect that he is having intermittent right bundle branch block.  We will obtain an echocardiogram.    This was a 30-minute patient encounter with greater than 50% time spent in care coordination and counseling.      Sincerely,    Nnamdi Gustafson MD    3/7/2022

## 2022-03-21 NOTE — DISCHARGE NOTE PROVIDER - NSDCADMDATE_GEN_ALL_CORE_FT
14-Apr-2021 14:05 Ear Wedge Repair Text: A wedge excision was completed by carrying down an excision through the full thickness of the ear and cartilage with an inward facing Burow's triangle. The wound was then closed in a layered fashion.

## 2022-07-08 NOTE — DISCHARGE NOTE PROVIDER - CARE PROVIDER_API CALL
Yes Leonardo Alcaraz)  Cardiology  [address], [address]  [place] [address]  Phone: [phone]  Fax: [phone]  Follow Up Time:    Kimani Uribe  CARDIOVASCULAR DISEASE  [address], [address]  [place] [address]  Phone: [phone]  Fax: [phone]  Established Patient  Follow Up Time: 2 weeks

## 2022-07-18 ENCOUNTER — INPATIENT (INPATIENT)
Facility: HOSPITAL | Age: 82
LOS: 22 days | Discharge: NURSING, REHAB OR LONG-TERM CARE | DRG: 25 | End: 2022-08-10
Attending: NEUROLOGICAL SURGERY | Admitting: NEUROLOGICAL SURGERY
Payer: MEDICARE

## 2022-07-18 VITALS
OXYGEN SATURATION: 98 % | TEMPERATURE: 99 F | HEART RATE: 88 BPM | DIASTOLIC BLOOD PRESSURE: 74 MMHG | RESPIRATION RATE: 20 BRPM | HEIGHT: 65 IN | SYSTOLIC BLOOD PRESSURE: 141 MMHG | WEIGHT: 162.92 LBS

## 2022-07-18 DIAGNOSIS — I73.9 PERIPHERAL VASCULAR DISEASE, UNSPECIFIED: Chronic | ICD-10-CM

## 2022-07-18 DIAGNOSIS — I25.10 ATHEROSCLEROTIC HEART DISEASE OF NATIVE CORONARY ARTERY WITHOUT ANGINA PECTORIS: Chronic | ICD-10-CM

## 2022-07-18 LAB
ALBUMIN SERPL ELPH-MCNC: 4.8 G/DL — SIGNIFICANT CHANGE UP (ref 3.3–5)
ALP SERPL-CCNC: 96 U/L — SIGNIFICANT CHANGE UP (ref 40–120)
ALT FLD-CCNC: 9 U/L — LOW (ref 10–45)
ANION GAP SERPL CALC-SCNC: 14 MMOL/L — SIGNIFICANT CHANGE UP (ref 5–17)
APTT BLD: 29.2 SEC — SIGNIFICANT CHANGE UP (ref 27.5–35.5)
AST SERPL-CCNC: 16 U/L — SIGNIFICANT CHANGE UP (ref 10–40)
BASOPHILS # BLD AUTO: 0.04 K/UL — SIGNIFICANT CHANGE UP (ref 0–0.2)
BASOPHILS NFR BLD AUTO: 0.4 % — SIGNIFICANT CHANGE UP (ref 0–2)
BILIRUB SERPL-MCNC: 0.4 MG/DL — SIGNIFICANT CHANGE UP (ref 0.2–1.2)
BUN SERPL-MCNC: 29 MG/DL — HIGH (ref 7–23)
CALCIUM SERPL-MCNC: 10.3 MG/DL — SIGNIFICANT CHANGE UP (ref 8.4–10.5)
CHLORIDE SERPL-SCNC: 103 MMOL/L — SIGNIFICANT CHANGE UP (ref 96–108)
CO2 SERPL-SCNC: 25 MMOL/L — SIGNIFICANT CHANGE UP (ref 22–31)
CREAT SERPL-MCNC: 0.9 MG/DL — SIGNIFICANT CHANGE UP (ref 0.5–1.3)
EGFR: 64 ML/MIN/1.73M2 — SIGNIFICANT CHANGE UP
EOSINOPHIL # BLD AUTO: 0.48 K/UL — SIGNIFICANT CHANGE UP (ref 0–0.5)
EOSINOPHIL NFR BLD AUTO: 4.9 % — SIGNIFICANT CHANGE UP (ref 0–6)
GLUCOSE SERPL-MCNC: 180 MG/DL — HIGH (ref 70–99)
HCT VFR BLD CALC: 43.2 % — SIGNIFICANT CHANGE UP (ref 34.5–45)
HGB BLD-MCNC: 13.8 G/DL — SIGNIFICANT CHANGE UP (ref 11.5–15.5)
IMM GRANULOCYTES NFR BLD AUTO: 0.4 % — SIGNIFICANT CHANGE UP (ref 0–1.5)
INR BLD: 1.03 RATIO — SIGNIFICANT CHANGE UP (ref 0.88–1.16)
LYMPHOCYTES # BLD AUTO: 3.01 K/UL — SIGNIFICANT CHANGE UP (ref 1–3.3)
LYMPHOCYTES # BLD AUTO: 30.9 % — SIGNIFICANT CHANGE UP (ref 13–44)
MCHC RBC-ENTMCNC: 30.7 PG — SIGNIFICANT CHANGE UP (ref 27–34)
MCHC RBC-ENTMCNC: 31.9 GM/DL — LOW (ref 32–36)
MCV RBC AUTO: 96.2 FL — SIGNIFICANT CHANGE UP (ref 80–100)
MONOCYTES # BLD AUTO: 0.53 K/UL — SIGNIFICANT CHANGE UP (ref 0–0.9)
MONOCYTES NFR BLD AUTO: 5.4 % — SIGNIFICANT CHANGE UP (ref 2–14)
NEUTROPHILS # BLD AUTO: 5.63 K/UL — SIGNIFICANT CHANGE UP (ref 1.8–7.4)
NEUTROPHILS NFR BLD AUTO: 58 % — SIGNIFICANT CHANGE UP (ref 43–77)
NRBC # BLD: 0 /100 WBCS — SIGNIFICANT CHANGE UP (ref 0–0)
PLATELET # BLD AUTO: 387 K/UL — SIGNIFICANT CHANGE UP (ref 150–400)
POTASSIUM SERPL-MCNC: 3.8 MMOL/L — SIGNIFICANT CHANGE UP (ref 3.5–5.3)
POTASSIUM SERPL-SCNC: 3.8 MMOL/L — SIGNIFICANT CHANGE UP (ref 3.5–5.3)
PROT SERPL-MCNC: 7.7 G/DL — SIGNIFICANT CHANGE UP (ref 6–8.3)
PROTHROM AB SERPL-ACNC: 11.8 SEC — SIGNIFICANT CHANGE UP (ref 10.5–13.4)
RBC # BLD: 4.49 M/UL — SIGNIFICANT CHANGE UP (ref 3.8–5.2)
RBC # FLD: 12.5 % — SIGNIFICANT CHANGE UP (ref 10.3–14.5)
SODIUM SERPL-SCNC: 142 MMOL/L — SIGNIFICANT CHANGE UP (ref 135–145)
WBC # BLD: 9.73 K/UL — SIGNIFICANT CHANGE UP (ref 3.8–10.5)
WBC # FLD AUTO: 9.73 K/UL — SIGNIFICANT CHANGE UP (ref 3.8–10.5)

## 2022-07-18 PROCEDURE — 71260 CT THORAX DX C+: CPT | Mod: 26,MG

## 2022-07-18 PROCEDURE — 99285 EMERGENCY DEPT VISIT HI MDM: CPT | Mod: FS

## 2022-07-18 PROCEDURE — G1004: CPT

## 2022-07-18 PROCEDURE — 70450 CT HEAD/BRAIN W/O DYE: CPT | Mod: 26,MA

## 2022-07-18 PROCEDURE — 74177 CT ABD & PELVIS W/CONTRAST: CPT | Mod: 26,MG

## 2022-07-18 RX ORDER — LEVETIRACETAM 250 MG/1
500 TABLET, FILM COATED ORAL
Refills: 0 | Status: DISCONTINUED | OUTPATIENT
Start: 2022-07-18 | End: 2022-07-27

## 2022-07-18 RX ORDER — DEXAMETHASONE 0.5 MG/5ML
10 ELIXIR ORAL ONCE
Refills: 0 | Status: DISCONTINUED | OUTPATIENT
Start: 2022-07-18 | End: 2022-07-18

## 2022-07-18 RX ORDER — DEXAMETHASONE 0.5 MG/5ML
4 ELIXIR ORAL ONCE
Refills: 0 | Status: COMPLETED | OUTPATIENT
Start: 2022-07-18 | End: 2022-07-18

## 2022-07-18 NOTE — ED PROVIDER NOTE - NSICDXPASTSURGICALHX_GEN_ALL_CORE_FT
PAST SURGICAL HISTORY:  CAD (coronary artery disease) stents    PAD (peripheral artery disease)

## 2022-07-18 NOTE — ED PROVIDER NOTE - RAPID ASSESSMENT
83 y/o F w/ PMHx HTN, CAD, HLD, MI, peripheral neuropathy, DM and peripheral artery disease, had an abnormal MRI of spine of 07/14 and was told by her specialist (Dr. Castro) on 07/15 she needed to get a dedicated MRI of her brain. Pt waited until today to come in because she states she "feels fine." Denies any headache, weakness/numbness/tingling of extremities or any other acute complaints.     Tena MCCAINibpita) have documented this rapid assessment note under the dictation of Hillary TOWNSEND) which has been reviewed and affirmed to be accurate. Patient was seen as a QPA patient. The patient will be seen and further worked up in the main emergency department and their care will be completed by the main emergency department team along with a thorough physical exam. Receiving team will follow up on labs, analgesia, any clinical imaging, reassess and disposition as clinically indicated, all decisions regarding the progression of care will be made at their discretion. 81 y/o F w/ PMHx HTN, CAD, HLD, MI, peripheral neuropathy, DM and peripheral artery disease, had an abnormal MRI of spine of 07/14 and was told by her specialist (Dr. Castro) on 07/15 she needed to get a dedicated MRI of her brain. Pt waited until today to come in because she states she "feels fine." Denies any headache, weakness/numbness/tingling of extremities or any other acute complaints. reports chronic balance issues for over a year but nothing new or different. she reports "I don't know why I am here but Dr. Uribe does"    I, Tena Motley (Scribe) have documented this rapid assessment note under the dictation of Hillary Albarran (PA) which has been reviewed and affirmed to be accurate. Patient was seen as a QPA patient. The patient will be seen and further worked up in the main emergency department and their care will be completed by the main emergency department team along with a thorough physical exam. Receiving team will follow up on labs, analgesia, any clinical imaging, reassess and disposition as clinically indicated, all decisions regarding the progression of care will be made at their discretion.    Hillary Albarran PA-C: The scribe's documentation has been prepared under my direction and personally reviewed by me in its entirety. I confirm that the note above accurately reflects history obtained.   Patient was rapidly assessed via telemedicine encounter; a limited history was obtained. The patient will be seen and further examined and worked up in the main ED and their care will be completed by the main ED team. Receiving team will follow up on labs, analgesia, any clinical imaging, and perform reassessment and disposition of the patient as clinically indicated. All decisions regarding the progression of care will be made at their discretion. 81 y/o F w/ PMHx HTN, CAD, HLD, MI, peripheral neuropathy, DM and peripheral artery disease, had an abnormal MRI of spine of 07/14 and was told by her specialist (Dr. Castro) on 07/15 she needed to get a dedicated MRI of her brain. Pt waited until today to come in because she states she "feels fine." Denies any headache, weakness/numbness/tingling of extremities or any other acute complaints. reports chronic balance issues for over a year but nothing new or different. she reports "I don't know why I am here but Dr. Uribe does"    I, Tena Motley (Scribe) have documented this rapid assessment note under the dictation of Hillary Albarran (PA) which has been reviewed and affirmed to be accurate. Patient was seen as a QPA patient. The patient will be seen and further worked up in the main emergency department and their care will be completed by the main emergency department team along with a thorough physical exam. Receiving team will follow up on labs, analgesia, any clinical imaging, reassess and disposition as clinically indicated, all decisions regarding the progression of care will be made at their discretion.    Hillary Albarran PA-C: The scribe's documentation has been prepared under my direction and personally reviewed by me in its entirety. I confirm that the note above accurately reflects history obtained.   Patient was rapidly assessed via telemedicine encounter; a limited history was obtained. The patient will be seen and further examined and worked up in the main ED and their care will be completed by the main ED team. Receiving team will follow up on labs, analgesia, any clinical imaging, and perform reassessment and disposition of the patient as clinically indicated. All decisions regarding the progression of care will be made at their discretion.    Dandy Taylor MD: I did not see nor evaluate nor provide care for this patient.

## 2022-07-18 NOTE — ED PROVIDER NOTE - ATTENDING APP SHARED VISIT CONTRIBUTION OF CARE
83 y/o F w/ PMHx HTN, CAD, HLD, MI, peripheral neuropathy, DM and peripheral artery disease, had an abnormal MRI of spine of 07/14 and was told by her specialist (Dr. Castro) on 07/15 due to mri brain showing abnormal finding, here qdoc ct head with mass noted r sided with NEUROLOGICAL: Alert and oriented, no focal deficits, no motor or sensory deficits. CN II-XII intact, 5/5 strength. Gait intact, FTN intact, tandem gait with difficulty. Imngo-Hallpike neg. No nystagmus.  NS consulted likely will need med admission for malignancy work up, NS recommendations.

## 2022-07-18 NOTE — ED ADULT NURSE NOTE - OBJECTIVE STATEMENT
82 y.o female, A&Ox4, PMH neuropathy, DM, HTN, HLD, MI, CAD, pt presents to ED c/o abnormal MRI. pt states she has chronic spine issues and went for an MRI on 7/14, pt was then contacted by MD who advised pt to come to ED for further workup as he noticed a mass on the pt brain. pt states she has been dealing with balance issues since april 2022, pt states she "feels like her legs won't walk straight", pt states when she sits for long periods of time when she stands up she has increased loss of balance when walking. gross neuro intact, PERRL 3mm equal and reactive. pt otherwise denies headaches, N/V/D, dizziness, fevers, chills, chest pain, sob. pt iv done in triage by qdoc RN, pt safety and comfort provided.

## 2022-07-18 NOTE — ED PROVIDER NOTE - RAPID ASSESSMENT DATE/TIME
Patient and family member reviewed discharge.  Discussed printed discharge information.  Follow-up appts reviewed.   What s/s warrant return to the ER.    Importance of social distancing, good hand hygiene, and wearing a mask when in public spaces.   18-Jul-2022 20:51

## 2022-07-18 NOTE — ED PROVIDER NOTE - NSICDXFAMILYHX_GEN_ALL_CORE_FT
FAMILY HISTORY:  Sibling  Still living? No  Family history of stroke, Age at diagnosis: Age Unknown

## 2022-07-18 NOTE — ED PROVIDER NOTE - PROGRESS NOTE DETAILS
call from neuro rads regarding multiple masses with mass effect 1 cm shift, called mobile to get patient to main section ED,  spoke to care team to advise of discussion with neuro rads, paged nsx - Hillary Albarran PA-C nsx aware. - Hillary Albarran PA-C Reviewed pt case with Dr. Garza for admission, pt was accepted.  - Saul Painter PA-C Seen by KIMBERLY, recs for steroids, Keppra, recs for admission for expedited malignancy workup. Reviewed imaging with pt, aware of brain mass. Spoke with pt's son, aware of brain mass, questions answered to his satisfaction.  - Saul Painter PA-C

## 2022-07-18 NOTE — CONSULT NOTE ADULT - ASSESSMENT
82F mult med hx (no cancer hx) inc "stent in R leg" 2-3y ago on ASA/plavix, was called by her orthopedic surgeon, Dr. Alin Castro, telling her to come to ED 4d ago for abnormal MRI C-spine (7/14) c/f intracranial pathology. Pt waited until today to come in b/c feels fine. Reports balance/gait issues since April. CTH showing 4.8x3.2x3.2cm R temp mass and 1.4x1.3x1.6cm L frontal mass w/ vasogenic edema in mult lobes; ~8mm MLS. Also got MR L spine (7/14) form LBP radiating to legs showing multilevel foraminal stenosis/disc bulges; L5-S1 grade 1 spondy. Exam: AOx3, PERRL, EOMI, no facial, no drift, DE LA GARZA 5/5, SILT, no alfredo, no clonus.  -No acute nsgy intervention  -Rec adm med for met w/u inc CT CAP w/ IV contrast  -MRI brain yariel  -Keppra 500bid  -Dex 4q6 82F mult med hx (no cancer hx) inc "stent in R leg" 2-3y ago on ASA/plavix, was called by her orthopedic surgeon, Dr. Alin Castro, telling her to come to ED 4d ago for abnormal MRI C-spine (7/14) c/f intracranial pathology. Pt waited until today to come in b/c feels fine. Reports balance/gait issues since April. CTH showing 4.8x3.2x3.2cm R temp mass and 1.4x1.3x1.6cm L frontal mass w/ vasogenic edema in mult lobes; ~8mm MLS. Also got MR L spine (7/14) form LBP radiating to legs showing multilevel foraminal stenosis/disc bulges; L5-S1 grade 1 spondy. Exam: AOx3, PERRL, EOMI, no facial, no drift, DE LA GARZA 5/5, SILT, no alfredo, no clonus.  -No acute nsgy intervention  -Rec adm med for met w/u inc CT CAP w/ IV contrast  -MRI brain rachelo  -Keppra 500bid  -Dex 4q6  -PT/OT  -Pain control 82F mult med hx (no cancer hx) inc "stent in R leg" 2-3y ago on ASA/plavix, was called by her orthopedic surgeon, Dr. Alin Castro, telling her to come to ED 4d ago for abnormal MRI C-spine (7/14) c/f intracranial pathology. Pt waited until today to come in b/c feels fine. Reports balance/gait issues since April. CTH showing 4.8x3.2x3.2cm R temp mass and 1.4x1.3x1.6cm L frontal mass w/ vasogenic edema in mult lobes; ~8mm MLS. Also got MR L spine (7/14) form LBP radiating to legs showing multilevel foraminal stenosis/disc bulges; L5-S1 grade 1 spondy. Exam: AOx3, PERRL, EOMI, no facial, no drift, DE LA GARZA 5/5, SILT, no alfredo, no clonus.  -No acute nsgy intervention  -Rec adm med for met w/u inc CT CAP w/ IV contrast  -MRI brain rachelo  -Keppra 500bid  -Hold AC/AP  -Dex 4q6  -PT/OT  -Pain control

## 2022-07-18 NOTE — ED PROVIDER NOTE - OBJECTIVE STATEMENT
83 y/o female (former smoker) with PMHx of HTN, CAD, HLD, MI, peripheral neuropathy, DM and peripheral artery disease presented to the ED for imaging for her head due to abnormal spine MRI by her orthopedist (Dr. Castro) during workup for her spine for her chronic back problems. The orthopedist noted something in her brain. Otherwise pt has no symptoms, stated that since 04/2022 she has had gait/balance issues. Endorsed that if she sits for a while and gets up, she feels like she is "drunk" and feels off. Denied chest pain, SOB, fevers, chills, numbness/paresthesias, muscular weakness, dizziness, abdominal pain, headaches.

## 2022-07-18 NOTE — ED PROVIDER NOTE - NSICDXPASTMEDICALHX_GEN_ALL_CORE_FT
PAST MEDICAL HISTORY:  Coronary artery disease     Diabetes     Hyperlipidemia     Hypertension     Myocardial infarct     Peripheral artery disease     Peripheral neuropathy

## 2022-07-18 NOTE — CONSULT NOTE ADULT - SUBJECTIVE AND OBJECTIVE BOX
p (2650)    82F mult med hx (no cancer hx) inc "stent in R leg" 2-3y ago on ASA/plavix, was called by her orthopedic surgeon, Dr. Alin Castro, telling her to come to ED 4d ago for abnormal MRI C-spine (7/14) c/f intracranial pathology. Pt waited until today to come in b/c feels fine. Reports balance/gait issues since April. CTH showing 4.8x3.2x3.2cm R temp mass and 1.4x1.3x1.6cm L frontal mass w/ vasogenic edema in mult lobes; ~8mm MLS. Also got MR L spine (7/14) form LBP radiating to legs showing multilevel foraminal stenosis/disc bulges; L5-S1 grade 1 spondy. Exam: AOx3, PERRL, EOMI, no facial, no drift, DE LA GARZA 5/5, SILT, no alfredo, no clonus.    --Anticoagulation:    =====================  PAST MEDICAL HISTORY   Hypertension    Coronary artery disease    Hyperlipidemia    Peripheral artery disease    Myocardial infarct    Peripheral neuropathy    Diabetes      PAST SURGICAL HISTORY   PAD (peripheral artery disease)    CAD (coronary artery disease)          MEDICATIONS:  Antibiotics:    Neuro:    Other:  dexAMETHasone  Injectable 10 milliGRAM(s) IV Push Once      SOCIAL HISTORY:   Occupation:   Marital Status:     FAMILY HISTORY:  No pertinent family history in first degree relatives    Family history of stroke (Sibling)        ROS: Negative except per HPI    LABS:  PT/INR - ( 18 Jul 2022 21:10 )   PT: 11.8 sec;   INR: 1.03 ratio         PTT - ( 18 Jul 2022 21:10 )  PTT:29.2 sec                        13.8   9.73  )-----------( 387      ( 18 Jul 2022 21:10 )             43.2     07-18    142  |  103  |  29<H>  ----------------------------<  180<H>  3.8   |  25  |  0.90    Ca    10.3      18 Jul 2022 21:10    TPro  7.7  /  Alb  4.8  /  TBili  0.4  /  DBili  x   /  AST  16  /  ALT  9<L>  /  AlkPhos  96  07-18

## 2022-07-18 NOTE — ED PROVIDER NOTE - PHYSICAL EXAMINATION
CONSTITUTIONAL: Patient is awake, alert and oriented x 3. Patient is well appearing and in no acute distress.  HEAD: NC/AT  EYES: PERRL b/l, EOMI  NECK: supple, no LAD  LUNGS: CTAB, no increased WOB, no wheezing/rales appreciated  HEART: RRR.+S1S2, no murmurs appreciated  ABDOMEN: Soft nd/nt, +BS, no rebound or guarding.   EXTREMITY: WWP, no edema or calf tenderness b/l, full ROM upper and lower ext b/l  SKIN: No rash or lesions appreciated  NEURO: Cn3-12 grossly intact. Strength 5/5 UE/LE. normal sensation, normal gait.

## 2022-07-19 DIAGNOSIS — G93.89 OTHER SPECIFIED DISORDERS OF BRAIN: ICD-10-CM

## 2022-07-19 LAB
GLUCOSE BLDC GLUCOMTR-MCNC: 241 MG/DL — HIGH (ref 70–99)
GLUCOSE BLDC GLUCOMTR-MCNC: 299 MG/DL — HIGH (ref 70–99)
GLUCOSE BLDC GLUCOMTR-MCNC: 365 MG/DL — HIGH (ref 70–99)
GLUCOSE BLDC GLUCOMTR-MCNC: 394 MG/DL — HIGH (ref 70–99)
SARS-COV-2 RNA SPEC QL NAA+PROBE: SIGNIFICANT CHANGE UP

## 2022-07-19 RX ORDER — ATORVASTATIN CALCIUM 80 MG/1
40 TABLET, FILM COATED ORAL AT BEDTIME
Refills: 0 | Status: DISCONTINUED | OUTPATIENT
Start: 2022-07-19 | End: 2022-07-27

## 2022-07-19 RX ORDER — LOSARTAN POTASSIUM 100 MG/1
100 TABLET, FILM COATED ORAL DAILY
Refills: 0 | Status: DISCONTINUED | OUTPATIENT
Start: 2022-07-19 | End: 2022-07-27

## 2022-07-19 RX ORDER — METOPROLOL TARTRATE 50 MG
50 TABLET ORAL THREE TIMES A DAY
Refills: 0 | Status: DISCONTINUED | OUTPATIENT
Start: 2022-07-19 | End: 2022-07-21

## 2022-07-19 RX ORDER — INSULIN LISPRO 100/ML
VIAL (ML) SUBCUTANEOUS
Refills: 0 | Status: DISCONTINUED | OUTPATIENT
Start: 2022-07-19 | End: 2022-07-27

## 2022-07-19 RX ORDER — DEXTROSE 50 % IN WATER 50 %
15 SYRINGE (ML) INTRAVENOUS ONCE
Refills: 0 | Status: DISCONTINUED | OUTPATIENT
Start: 2022-07-19 | End: 2022-07-27

## 2022-07-19 RX ORDER — DEXTROSE 50 % IN WATER 50 %
12.5 SYRINGE (ML) INTRAVENOUS ONCE
Refills: 0 | Status: DISCONTINUED | OUTPATIENT
Start: 2022-07-19 | End: 2022-07-27

## 2022-07-19 RX ORDER — SIMVASTATIN 20 MG/1
40 TABLET, FILM COATED ORAL
Qty: 0 | Refills: 0 | DISCHARGE

## 2022-07-19 RX ORDER — HYDROCHLOROTHIAZIDE 25 MG
25 TABLET ORAL DAILY
Refills: 0 | Status: DISCONTINUED | OUTPATIENT
Start: 2022-07-19 | End: 2022-07-27

## 2022-07-19 RX ORDER — SODIUM CHLORIDE 9 MG/ML
1000 INJECTION, SOLUTION INTRAVENOUS
Refills: 0 | Status: DISCONTINUED | OUTPATIENT
Start: 2022-07-19 | End: 2022-07-27

## 2022-07-19 RX ORDER — GLUCAGON INJECTION, SOLUTION 0.5 MG/.1ML
1 INJECTION, SOLUTION SUBCUTANEOUS ONCE
Refills: 0 | Status: DISCONTINUED | OUTPATIENT
Start: 2022-07-19 | End: 2022-07-27

## 2022-07-19 RX ORDER — INSULIN LISPRO 100/ML
2 VIAL (ML) SUBCUTANEOUS
Refills: 0 | Status: DISCONTINUED | OUTPATIENT
Start: 2022-07-19 | End: 2022-07-20

## 2022-07-19 RX ORDER — INSULIN LISPRO 100/ML
VIAL (ML) SUBCUTANEOUS AT BEDTIME
Refills: 0 | Status: DISCONTINUED | OUTPATIENT
Start: 2022-07-19 | End: 2022-07-27

## 2022-07-19 RX ORDER — GABAPENTIN 400 MG/1
200 CAPSULE ORAL AT BEDTIME
Refills: 0 | Status: DISCONTINUED | OUTPATIENT
Start: 2022-07-19 | End: 2022-07-27

## 2022-07-19 RX ORDER — DEXAMETHASONE 0.5 MG/5ML
4 ELIXIR ORAL EVERY 6 HOURS
Refills: 0 | Status: DISCONTINUED | OUTPATIENT
Start: 2022-07-19 | End: 2022-07-27

## 2022-07-19 RX ORDER — DEXTROSE 50 % IN WATER 50 %
25 SYRINGE (ML) INTRAVENOUS ONCE
Refills: 0 | Status: DISCONTINUED | OUTPATIENT
Start: 2022-07-19 | End: 2022-07-27

## 2022-07-19 RX ORDER — INSULIN GLARGINE 100 [IU]/ML
15 INJECTION, SOLUTION SUBCUTANEOUS AT BEDTIME
Refills: 0 | Status: DISCONTINUED | OUTPATIENT
Start: 2022-07-19 | End: 2022-07-20

## 2022-07-19 RX ADMIN — Medication 4 MILLIGRAM(S): at 12:37

## 2022-07-19 RX ADMIN — Medication 4 MILLIGRAM(S): at 00:00

## 2022-07-19 RX ADMIN — Medication 2 UNIT(S): at 09:29

## 2022-07-19 RX ADMIN — GABAPENTIN 200 MILLIGRAM(S): 400 CAPSULE ORAL at 21:46

## 2022-07-19 RX ADMIN — LEVETIRACETAM 500 MILLIGRAM(S): 250 TABLET, FILM COATED ORAL at 00:00

## 2022-07-19 RX ADMIN — INSULIN GLARGINE 15 UNIT(S): 100 INJECTION, SOLUTION SUBCUTANEOUS at 21:48

## 2022-07-19 RX ADMIN — Medication 2 UNIT(S): at 12:58

## 2022-07-19 RX ADMIN — Medication 4 MILLIGRAM(S): at 06:15

## 2022-07-19 RX ADMIN — LEVETIRACETAM 500 MILLIGRAM(S): 250 TABLET, FILM COATED ORAL at 18:52

## 2022-07-19 RX ADMIN — Medication 4: at 15:54

## 2022-07-19 RX ADMIN — LEVETIRACETAM 500 MILLIGRAM(S): 250 TABLET, FILM COATED ORAL at 06:15

## 2022-07-19 RX ADMIN — Medication 6: at 12:13

## 2022-07-19 RX ADMIN — Medication 10: at 09:30

## 2022-07-19 RX ADMIN — Medication 6: at 21:49

## 2022-07-19 RX ADMIN — Medication 4 MILLIGRAM(S): at 18:52

## 2022-07-19 RX ADMIN — Medication 4 MILLIGRAM(S): at 23:24

## 2022-07-19 RX ADMIN — Medication 50 MILLIGRAM(S): at 21:47

## 2022-07-19 RX ADMIN — ATORVASTATIN CALCIUM 40 MILLIGRAM(S): 80 TABLET, FILM COATED ORAL at 21:46

## 2022-07-19 RX ADMIN — Medication 2 UNIT(S): at 15:54

## 2022-07-19 RX ADMIN — Medication 50 MILLIGRAM(S): at 15:32

## 2022-07-19 NOTE — CONSULT NOTE ADULT - SUBJECTIVE AND OBJECTIVE BOX
CARDIOLOGY CONSULT - Dr. Alcaraz         HPI:  83 y/o female (former smoker) with PMHx of HTN, CAD sp PCO,  HLD, MI, peripheral neuropathy, DM and peripheral artery disease presented to the ED for imaging for her head due to abnormal spine MRI by her orthopedist (Dr. Castro) during workup for her spine for her chronic back problems. The orthopedist noted something in her brain. Otherwise pt has no symptoms, stated that since 04/2022 she has had gait/balance issues. Endorsed that if she sits for a while and gets up, she feels like she is "drunk" and feels off. Denied chest pain, SOB, fevers, chills, numbness/paresthesias, muscular weakness, dizziness, abdominal pain, headaches. pt is known from prior admissions.       PAST MEDICAL & SURGICAL HISTORY:  Hypertension      Coronary artery disease      Hyperlipidemia      Peripheral artery disease      Myocardial infarct      Peripheral neuropathy      Diabetes      PAD (peripheral artery disease)      CAD (coronary artery disease)  stents              PREVIOUS DIAGNOSTIC TESTING:   [facility] 6/23/21:   s/p successful angioplasty to the mid RCA followed by intracoronary brachytherapy.  [facility] 4/14/21: POBA of mid RCA (80%)   [facility] 3/25/21: PCI/POBA to pCX 90%       MEDICATIONS:  Home Medications:  aspirin 81 mg oral tablet: 1 tab(s) orally once a day (23 Jun 2021 09:46)  gabapentin 100 mg oral capsule: 2 cap(s) orally once a day (at bedtime) (23 Jun 2021 09:46)  glimepiride 4 mg oral tablet: 1 tab(s) orally 2 times a day (23 Jun 2021 09:46)  losartan-hydrochlorothiazide 100 mg-25 mg oral tablet: 1 tab(s) orally once a day (23 Jun 2021 09:46)  metFORMIN 1000 mg oral tablet: 1 tab(s) orally 2 times a day  Hold for 2 days, restart on Saturday 6/26  (23 Jun 2021 20:48)  metoprolol tartrate 50 mg oral tablet: 1 tab(s) orally 3 times a day (23 Jun 2021 09:46)  simvastatin: 40 milligram(s) orally once a day (23 Jun 2021 09:46)  Toujeo Max SoloStar 300 units/mL subcutaneous solution: 6 unit(s) subcutaneous 2 times a day as directed (23 Jun 2021 09:46)  Victoza 18 mg/3 mL subcutaneous solution: 0.6 milligram(s) subcutaneous once a day (23 Jun 2021 09:46)      MEDICATIONS  (STANDING):  dexAMETHasone     Tablet 4 milliGRAM(s) Oral every 6 hours  dextrose 5%. 1000 milliLiter(s) (50 mL/Hr) IV Continuous <Continuous>  dextrose 5%. 1000 milliLiter(s) (100 mL/Hr) IV Continuous <Continuous>  dextrose 50% Injectable 25 Gram(s) IV Push once  dextrose 50% Injectable 12.5 Gram(s) IV Push once  dextrose 50% Injectable 25 Gram(s) IV Push once  glucagon  Injectable 1 milliGRAM(s) IntraMuscular once  insulin lispro (ADMELOG) corrective regimen sliding scale   SubCutaneous three times a day before meals  insulin lispro (ADMELOG) corrective regimen sliding scale   SubCutaneous at bedtime  insulin lispro Injectable (ADMELOG) 2 Unit(s) SubCutaneous three times a day before meals  levETIRAcetam 500 milliGRAM(s) Oral two times a day      FAMILY HISTORY:  Family history of stroke (Sibling)        SOCIAL HISTORY:    [ ] Non-smoker  [ ] Smoker  [ ] Alcohol    Allergies    No Known Allergies    Intolerances    	    REVIEW OF SYSTEMS:  CONSTITUTIONAL: No fever, weight loss, or fatigue  EYES: No eye pain, visual disturbances, or discharge  ENMT:  No difficulty hearing, tinnitus, vertigo; No sinus or throat pain  NECK: No pain or stiffness  RESPIRATORY: No cough, wheezing, chills or hemoptysis; No Shortness of Breath  CARDIOVASCULAR: No chest pain, palpitations, passing out, dizziness, or leg swelling  GASTROINTESTINAL: No abdominal or epigastric pain. No nausea, vomiting, or hematemesis; No diarrhea or constipation. No melena or hematochezia.  GENITOURINARY: No dysuria, frequency, hematuria, or incontinence  NEUROLOGICAL: No headaches, memory loss, loss of strength, numbness, or tremors  SKIN: No itching, burning, rashes, or lesions   	    [ ] All others negative	  [ ] Unable to obtain    PHYSICAL EXAM:  T(C): 36.6 (07-19-22 @ 07:15), Max: 37.1 (07-18-22 @ 17:01)  HR: 72 (07-19-22 @ 07:15) (72 - 95)  BP: 144/82 (07-19-22 @ 07:15) (102/61 - 164/83)  RR: 18 (07-19-22 @ 07:15) (18 - 20)  SpO2: 96% (07-19-22 @ 07:15) (96% - 99%)  Wt(kg): --  I&O's Summary      Appearance: Normal	  Psychiatry: A & O x 3, Mood & affect appropriate  HEENT:   Normal oral mucosa, PERRL, EOMI	  Lymphatic: No lymphadenopathy  Cardiovascular: Normal S1 S2,RRR, No JVD, No murmurs  Respiratory: Lungs clear to auscultation	  Gastrointestinal:  Soft, Non-tender, + BS	  Skin: No rashes, No ecchymoses, No cyanosis	  Neurologic: Non-focal  Extremities: Normal range of motion, No clubbing, cyanosis or edema  Vascular: Peripheral pulses palpable 2+ bilaterally    TELEMETRY: 	    ECG:  	  RADIOLOGY:  OTHER: 	  	  LABS:	 	    CARDIAC MARKERS:                                  13.8   9.73  )-----------( 387      ( 18 Jul 2022 21:10 )             43.2     07-18    142  |  103  |  29<H>  ----------------------------<  180<H>  3.8   |  25  |  0.90    Ca    10.3      18 Jul 2022 21:10    TPro  7.7  /  Alb  4.8  /  TBili  0.4  /  DBili  x   /  AST  16  /  ALT  9<L>  /  AlkPhos  96  07-18    PT/INR - ( 18 Jul 2022 21:10 )   PT: 11.8 sec;   INR: 1.03 ratio         PTT - ( 18 Jul 2022 21:10 )  PTT:29.2 sec  proBNP:   Lipid Profile:   HgA1c:   TSH:        CARDIOLOGY CONSULT - Dr. Alcaraz         HPI:  83 y/o female (former smoker) with PMHx of HTN, CAD sp PCI,  HLD, MI, peripheral neuropathy, DM and peripheral artery disease presented to the ED for imaging for her head due to abnormal spine MRI by her orthopedist (Dr. Castro) during workup for her spine for her chronic back problems. The orthopedist noted something in her brain. Otherwise pt has no symptoms, stated that since 04/2022 she has had gait/balance issues. Endorsed that if she sits for a while and gets up, she feels like she is "drunk" and feels off. Denied chest pain, SOB, fevers, chills, numbness/paresthesias, muscular weakness, dizziness, abdominal pain, headaches. pt is known from prior admissions. no cp or sob   takes 81 asa give PCI hx   ROS otherwise negative         PAST MEDICAL & SURGICAL HISTORY:  Hypertension      Coronary artery disease      Hyperlipidemia      Peripheral artery disease      Myocardial infarct      Peripheral neuropathy      Diabetes      PAD (peripheral artery disease)      CAD (coronary artery disease)  stents              PREVIOUS DIAGNOSTIC TESTING:   [facility] 6/23/21:   s/p successful angioplasty to the mid RCA followed by intracoronary brachytherapy.  [facility] 4/14/21: POBA of mid RCA (80%)   [facility] 3/25/21: PCI/POBA to pCX 90%       MEDICATIONS:  Home Medications:  aspirin 81 mg oral tablet: 1 tab(s) orally once a day (23 Jun 2021 09:46)  gabapentin 100 mg oral capsule: 2 cap(s) orally once a day (at bedtime) (23 Jun 2021 09:46)  glimepiride 4 mg oral tablet: 1 tab(s) orally 2 times a day (23 Jun 2021 09:46)  losartan-hydrochlorothiazide 100 mg-25 mg oral tablet: 1 tab(s) orally once a day (23 Jun 2021 09:46)  metFORMIN 1000 mg oral tablet: 1 tab(s) orally 2 times a day  Hold for 2 days, restart on Saturday 6/26  (23 Jun 2021 20:48)  metoprolol tartrate 50 mg oral tablet: 1 tab(s) orally 3 times a day (23 Jun 2021 09:46)  simvastatin: 40 milligram(s) orally once a day (23 Jun 2021 09:46)  Toujeo Max SoloStar 300 units/mL subcutaneous solution: 6 unit(s) subcutaneous 2 times a day as directed (23 Jun 2021 09:46)  Victoza 18 mg/3 mL subcutaneous solution: 0.6 milligram(s) subcutaneous once a day (23 Jun 2021 09:46)      MEDICATIONS  (STANDING):  dexAMETHasone     Tablet 4 milliGRAM(s) Oral every 6 hours  dextrose 5%. 1000 milliLiter(s) (50 mL/Hr) IV Continuous <Continuous>  dextrose 5%. 1000 milliLiter(s) (100 mL/Hr) IV Continuous <Continuous>  dextrose 50% Injectable 25 Gram(s) IV Push once  dextrose 50% Injectable 12.5 Gram(s) IV Push once  dextrose 50% Injectable 25 Gram(s) IV Push once  glucagon  Injectable 1 milliGRAM(s) IntraMuscular once  insulin lispro (ADMELOG) corrective regimen sliding scale   SubCutaneous three times a day before meals  insulin lispro (ADMELOG) corrective regimen sliding scale   SubCutaneous at bedtime  insulin lispro Injectable (ADMELOG) 2 Unit(s) SubCutaneous three times a day before meals  levETIRAcetam 500 milliGRAM(s) Oral two times a day      FAMILY HISTORY:  Family history of stroke (Sibling)        SOCIAL HISTORY:    [x ] Former smoker     Allergies    No Known Allergies    Intolerances    	    REVIEW OF SYSTEMS:  CONSTITUTIONAL: No fever, weight loss, or fatigue  EYES: No eye pain, visual disturbances, or discharge  ENMT:  No difficulty hearing, tinnitus, vertigo; No sinus or throat pain  NECK: No pain or stiffness  RESPIRATORY: No cough, wheezing, chills or hemoptysis; No Shortness of Breath  CARDIOVASCULAR: No chest pain, palpitations, passing out, dizziness, or leg swelling  GASTROINTESTINAL: No abdominal or epigastric pain. No nausea, vomiting, or hematemesis; No diarrhea or constipation. No melena or hematochezia.  GENITOURINARY: No dysuria, frequency, hematuria, or incontinence  NEUROLOGICAL: No headaches, memory loss, loss of strength, numbness, or tremors  SKIN: No itching, burning, rashes, or lesions   	    [x ] All others negative	  [ ] Unable to obtain    PHYSICAL EXAM:  T(C): 36.6 (07-19-22 @ 07:15), Max: 37.1 (07-18-22 @ 17:01)  HR: 72 (07-19-22 @ 07:15) (72 - 95)  BP: 144/82 (07-19-22 @ 07:15) (102/61 - 164/83)  RR: 18 (07-19-22 @ 07:15) (18 - 20)  SpO2: 96% (07-19-22 @ 07:15) (96% - 99%)  Wt(kg): --  I&O's Summary      Appearance: Normal	  Psychiatry: A & O x 3, Mood & affect appropriate  HEENT:   Normal oral mucosa, PERRL, EOMI	  Lymphatic: No lymphadenopathy  Cardiovascular: Normal S1 S2,RRR, No JVD, No murmurs  Respiratory: Lungs clear to auscultation	  Gastrointestinal:  Soft, Non-tender, + BS	  Skin: No rashes, No ecchymoses, No cyanosis	  Neurologic: Non-focal  Extremities: Normal range of motion, No clubbing, cyanosis or edema  Vascular: Peripheral pulses palpable 2+ bilaterally    TELEMETRY: 	    ECG:  	PENDING   RADIOLOGY:    < from: CT Head No Cont (07.18.22 @ 21:39) >  IMPRESSION:  Cystic versus a necrotic masses measure approximately 4.8 x 3.2 x 3.2 cm   in the right temporal lobe and 1.4 x 1.3 x 1.6 cm in the medial left   frontal lobe.    Moderate to large amount of vasogenic edema in the right frontal,   parietal and temporal lobes.  Trace edema in the left frontal lobe.    Regional mass effect in the right cerebral hemisphere with partial   effacement of theventricular system and 5 mm subfalcine herniation of   the right frontal lobe underneath the cerebral falx.    The findings may represent intracranial metastatic disease versus   glioblastoma.  Contrast-enhanced brain MRI is recommended for further   evaluation.      Findings discussed with DAVID Albarran by Dr. Harrington at 2153 hours on   7/8/2022 with readback confirmation.    < end of copied text >    < from: CT Chest w/ IV Cont (07.18.22 @ 22:52) >  IMPRESSION:  Left upper lobe lung mass with partial atelectasis of the left upper   lobe, concerning for primary lung neoplasm. Few scattered sub-4 mm   pulmonary nodules, indeterminate.    Indeterminant left adrenal nodule for which contrast enhanced MRI is   recommended for further evaluation.        < end of copied text >    OTHER: 	  	  LABS:	 	    CARDIAC MARKERS:                                  13.8   9.73  )-----------( 387      ( 18 Jul 2022 21:10 )             43.2     07-18    142  |  103  |  29<H>  ----------------------------<  180<H>  3.8   |  25  |  0.90    Ca    10.3      18 Jul 2022 21:10    TPro  7.7  /  Alb  4.8  /  TBili  0.4  /  DBili  x   /  AST  16  /  ALT  9<L>  /  AlkPhos  96  07-18    PT/INR - ( 18 Jul 2022 21:10 )   PT: 11.8 sec;   INR: 1.03 ratio         PTT - ( 18 Jul 2022 21:10 )  PTT:29.2 sec  proBNP:   Lipid Profile:   HgA1c:   TSH:

## 2022-07-19 NOTE — ED CLERICAL - DIVISION
[facility]... Rx Refill Note  Requested Prescriptions     Pending Prescriptions Disp Refills   • glucose blood (FREESTYLE TEST STRIPS) test strip 100 each 12     Sig: Twice daily and prn      Last office visit with prescribing clinician: 11/16/2021      Next office visit with prescribing clinician: 3/16/2022       {TIP  Please add Last Relevant Lab Date if appropriate: 11/16/21    Talita Spangler MA  01/21/22, 14:20 EST

## 2022-07-19 NOTE — PROGRESS NOTE ADULT - ASSESSMENT
82F mult med hx (no cancer hx) inc "stent in R leg" 2-3y ago on ASA/plavix, was called by her orthopedic surgeon, Dr. Alin Castro, telling her to come to ED 4d ago for abnormal MRI C-spine (7/14) c/f intracranial pathology. Pt waited until today to come in b/c feels fine. Reports balance/gait issues since April. CTH showing 4.8x3.2x3.2cm R temp mass and 1.4x1.3x1.6cm L frontal mass w/ vasogenic edema in mult lobes; ~8mm MLS. Also got MR L spine (7/14) form LBP radiating to legs showing multilevel foraminal stenosis/disc bulges; L5-S1 grade 1 spondy. Exam: AOx3, PERRL, EOMI, no facial, no drift, DE LA GARZA 5/5, SILT, no alfredo, no clonus.    -No acute nsgy intervention  -Rec adm med for met w/u  -MRI brain wwo  -IR consult to biopsy lung lesion  -Rad Onc, Med Onc consult  -Keppra 500bid  -Hold AC/AP  -Dex 4q6  -PT/OT  -Pain control 82F mult med hx (no cancer hx) inc "stent in R leg" 2-3y ago on ASA/plavix, was called by her orthopedic surgeon, Dr. Alin Castro, telling her to come to ED 4d ago for abnormal MRI C-spine (7/14) c/f intracranial pathology. Pt waited until today to come in b/c feels fine. Reports balance/gait issues since April. CTH showing 4.8x3.2x3.2cm R temp mass and 1.4x1.3x1.6cm L frontal mass w/ vasogenic edema in mult lobes; ~8mm MLS. Also got MR L spine (7/14) form LBP radiating to legs showing multilevel foraminal stenosis/disc bulges; L5-S1 grade 1 spondy. Exam: AOx3, PERRL, EOMI, no facial, no drift, DE LA GARZA 5/5, SILT, no alfredo, no clonus.  -Adm Medicine, q4h neurochecks  -MRI brain wwo  -Rec IR consult to biopsy lung lesion  -Rad Onc, Med Onc consult  -Keppra 500bid  -Hold AC/AP  -Dex 4q6  -PT/OT  -Pain control

## 2022-07-19 NOTE — CONSULT NOTE ADULT - SUBJECTIVE AND OBJECTIVE BOX
Chief Complaint:  Patient is a 82y old  Female who presents with a chief complaint of abnormal imaging (19 Jul 2022 12:50)      HPI: Patient has been experiencing abnormal gait. She had an out-pt MRI and reportedly was noted to have brain abnormalities. She was sent to the ER. CT scan noted multiple brain lesions with vasogenic edema. Seen by NS and she has been started on steroids and Keppra. CT C/A/P done and she has a ANTHONY lung mass and endobronchial lesion and scattered lung nodules and an indeterminate adrenal mass. She has been seen by pulmonary and she will be having a bronchoscopy tomorrow. patient seen and daughter at bedside. She says that her mother has been acting strange lately.     Medications:  atorvastatin 40 milliGRAM(s) Oral at bedtime  dexAMETHasone     Tablet 4 milliGRAM(s) Oral every 6 hours  dextrose 5%. 1000 milliLiter(s) IV Continuous <Continuous>  dextrose 5%. 1000 milliLiter(s) IV Continuous <Continuous>  dextrose 50% Injectable 25 Gram(s) IV Push once  dextrose 50% Injectable 12.5 Gram(s) IV Push once  dextrose 50% Injectable 25 Gram(s) IV Push once  dextrose Oral Gel 15 Gram(s) Oral once PRN  gabapentin 200 milliGRAM(s) Oral at bedtime  glucagon  Injectable 1 milliGRAM(s) IntraMuscular once  hydrochlorothiazide 25 milliGRAM(s) Oral daily  insulin glargine Injectable (LANTUS) 15 Unit(s) SubCutaneous at bedtime  insulin lispro (ADMELOG) corrective regimen sliding scale   SubCutaneous three times a day before meals  insulin lispro (ADMELOG) corrective regimen sliding scale   SubCutaneous at bedtime  insulin lispro Injectable (ADMELOG) 2 Unit(s) SubCutaneous three times a day before meals  levETIRAcetam 500 milliGRAM(s) Oral two times a day  losartan 100 milliGRAM(s) Oral daily  metoprolol tartrate 50 milliGRAM(s) Oral three times a day        Allergies:  No Known Allergies    FAMILY HISTORY:  Sister had a brain mass but she brady snot know the details    Social history: Social ETOH. No IVDA. Quite smoking in 2004.     PAST MEDICAL & SURGICAL HISTORY:  Hypertension      Coronary artery disease      Hyperlipidemia      Peripheral artery disease      Myocardial infarct      Peripheral neuropathy      Diabetes      PAD (peripheral artery disease)      CAD (coronary artery disease)  stents      REVIEW OF SYSTEMS      General: Appetite has been okay. She has not been losing weight. Some fatigue. No fevers, chills, sweats    Skin/Breast: No rash or bruising  	  	  ENMT:	No nosebleeds or sore thraot    Respiratory and Thorax: No cough, wheeze, SOB, or hemoptysis  	  Cardiovascular:	 No CP    Gastrointestinal:	No N/V/D/C, abd pain    Genitourinary:	No dysuria or hematuria    Musculoskeletal:	 Some back pain. No leg pain or swelling    Neurological:	No HA, dizziness. She has neuropathy in feet due to DM    Vitals:  Vital Signs Last 24 Hrs  T(C): 36.3 (19 Jul 2022 13:33), Max: 37.1 (18 Jul 2022 17:01)  T(F): 97.3 (19 Jul 2022 13:33), Max: 98.8 (18 Jul 2022 17:01)  HR: 82 (19 Jul 2022 13:33) (72 - 95)  BP: 119/81 (19 Jul 2022 13:33) (102/61 - 164/83)  BP(mean): --  RR: 18 (19 Jul 2022 13:33) (18 - 20)  SpO2: 98% (19 Jul 2022 13:33) (96% - 99%)    Parameters below as of 19 Jul 2022 13:33  Patient On (Oxygen Delivery Method): room air        Pex:  alert NAD  EOMI anicteric sclera  Neck  No LNA  Cv s1 S2 RRR  Lungs clear B/L  abd soft NT ND +BS  No LE edema or tenderness  Moves all extremities    Labs:                        13.8   9.73  )-----------( 387      ( 18 Jul 2022 21:10 )             43.2     CBC Full  -  ( 18 Jul 2022 21:10 )  WBC Count : 9.73 K/uL  RBC Count : 4.49 M/uL  Hemoglobin : 13.8 g/dL  Hematocrit : 43.2 %  Platelet Count - Automated : 387 K/uL  Mean Cell Volume : 96.2 fl  Mean Cell Hemoglobin : 30.7 pg  Mean Cell Hemoglobin Concentration : 31.9 gm/dL  Auto Neutrophil # : 5.63 K/uL  Auto Lymphocyte # : 3.01 K/uL  Auto Monocyte # : 0.53 K/uL  Auto Eosinophil # : 0.48 K/uL  Auto Basophil # : 0.04 K/uL  Auto Neutrophil % : 58.0 %  Auto Lymphocyte % : 30.9 %  Auto Monocyte % : 5.4 %  Auto Eosinophil % : 4.9 %  Auto Basophil % : 0.4 %    07-18    142  |  103  |  29<H>  ----------------------------<  180<H>  3.8   |  25  |  0.90    Ca    10.3      18 Jul 2022 21:10    TPro  7.7  /  Alb  4.8  /  TBili  0.4  /  DBili  x   /  AST  16  /  ALT  9<L>  /  AlkPhos  96  07-18    PT/INR - ( 18 Jul 2022 21:10 )   PT: 11.8 sec;   INR: 1.03 ratio         PTT - ( 18 Jul 2022 21:10 )  PTT:29.2 sec  [id number]

## 2022-07-19 NOTE — CONSULT NOTE ADULT - CONSULT REASON
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

## 2022-07-19 NOTE — PATIENT PROFILE ADULT - STATED REASON FOR ADMISSION
Orthopedic sent her to hospital due to unbalance gait Sent in by MD. Seen something in MRI outpt STEMI

## 2022-07-19 NOTE — CONSULT NOTE ADULT - ASSESSMENT
82 year old female admitted with an abnormal out pt MRI. She has been having unsteady gait. Per her daughter she is acting differently. CT head notes several brain lesions with vasogenic edema and she has been seen by NS who recommended an MRI and starting Dex and Keppra which have been started. Ct C/A/P notes a ANTHONY mass with indeterminate lung nodules and adrenal nodule possibly/likely reflecting metastatic lung cancer. She will be having a bronchoscopy tomorrow. Explained that the management will likely be as out-pt and pathology results will take several days to be resulted and will likely not have any results until next week. Therefore, if she is otherwise medically stable and okay for d/c from medical perspective, then she could follow up pathology results as out-pt next week and then could decide further plan. labs are mostly unremarkable. They were given office contact information. They do understand all that was discussed.

## 2022-07-19 NOTE — CONSULT NOTE ADULT - ASSESSMENT
ASSESSMENT:    82 year old gentlewoman, former smoker, without history of intrinsic lung disease. He has a history of HTN, HLD, DM, CAD s/p PCI (1990s and 2021) and PAD s/p RLE stenting. The patient has been having ambulating over the last several months due to a sense of imbalance with impaired gait. Outpatient CT revealed a right temporal lobe and a left frontal lobe mass with vasogenic edema in multiple lobes. An MRI of the lumbar spine due back pain revealed multilevel foraminal stenosis/disc bulges. The patient has no shortness of breath or hypoxemia on room air. She has no cough, sputum production, chest congestion or wheeze. No fevers, chills or sweats. No chest pain/pressure or palpitations.    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 She has a history of HTN, HLD, DM, CAD s/p PCI (1990s and 2021) and PAD s/p RLE stenting. The patient has been having difficulty ambulating over the last several months due to a sense of imbalance with impaired gait. Her family has noted a change in her personality with pressured speech and insomnia. Outpatient cervical MRI incidentally note a CNS lesion. Lumbar spine MRI performed due to chronic back pain revealed multilevel foraminal stenosis/disc bulges. 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 The patient has no shortness of breath or hypoxemia on room air. She has no cough, sputum production, chest congestion or wheeze. No fevers, chills or sweats. No chest pain/pressure or palpitations.     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 with presenting with several months of ataxia - frontal lobe lesions have lead to decreased inhibition as well as pressured and loquacious speech      PLAN/RECOMMENDATIONS:    stable oxygenation on room air  no indication for pulmonary medications or antibiotics  bronchoscopy scheduled for 7/20 at noon - NPO after midnight  neurosurgery evaluation noted    no emergent surgery indicated    await MRI brain    no anticoagulation    prophylactic anticonvulsants -> keppra    decadron 4mg q6h -> follow fingerstick glucoses and treat steroid induced hyperglycemia  cardiology evaluation    ASA and plavix are currently on hold - patient had PCI last year    usual medications: ASA/plavix/zocor.lopressor/losartan-HCTZ    Thank you for the courtesy of this referral. Plan of care discussed with the patient and her daughter-in-law at bedside and with Dr. Kayla Hopkins MD, [facility]  [phone]  Pulmonary Medicine   ASSESSMENT:    82 year old gentlewoman, former smoker, without history of intrinsic lung disease. She has a history of HTN, HLD, DM, CAD s/p PCI (1990s and 2021) and PAD s/p RLE stenting. The patient has been having difficulty ambulating over the last several months due to a sense of imbalance with impaired gait. Her family has noted a change in her personality with pressured speech and insomnia. Outpatient cervical MRI incidentally note a CNS lesion. Lumbar spine MRI performed due to chronic back pain revealed multilevel foraminal stenosis/disc bulges. 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 The patient has no shortness of breath or hypoxemia on room air. She has no cough, sputum production, chest congestion or wheeze. No fevers, chills or sweats. No chest pain/pressure or palpitations.     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 presenting with several months of ataxia - frontal lobe lesions have lead to decreased inhibition as well as pressured and loquacious speech      PLAN/RECOMMENDATIONS:    stable oxygenation on room air  no indication for pulmonary medications or antibiotics  bronchoscopy scheduled for 7/20 at noon - NPO after midnight  neurosurgery evaluation noted    no emergent surgery indicated    await MRI brain    no anticoagulation    prophylactic anticonvulsants -> keppra    decadron 4mg q6h -> follow fingerstick glucoses and treat steroid induced hyperglycemia    radiation oncology evaluation  cardiology evaluation    ASA and plavix are currently on hold - patient had PCI last year    usual medications: ASA/plavix/zocor.lopressor/losartan-HCTZ    Thank you for the courtesy of this referral. Plan of care discussed with the patient and her daughter-in-law at bedside and with Crissy Garza and Catherine Hopkins MD, [facility]  [phone]  Pulmonary Medicine

## 2022-07-19 NOTE — PATIENT PROFILE ADULT - FALL HARM RISK - HARM RISK INTERVENTIONS

## 2022-07-19 NOTE — PROGRESS NOTE ADULT - SUBJECTIVE AND OBJECTIVE BOX
Patient seen and examined at bedside.    --Anticoagulation--    T(C): 37.1 (07-19-22 @ 06:29), Max: 37.1 (07-18-22 @ 17:01)  HR: 85 (07-19-22 @ 06:29) (77 - 95)  BP: 164/83 (07-19-22 @ 06:29) (102/61 - 164/83)  RR: 18 (07-19-22 @ 06:29) (18 - 20)  SpO2: 97% (07-19-22 @ 06:29) (96% - 99%)  Wt(kg): --    Exam: AOx3, PERRL, EOMI, no drift, no facial droop, DE LA GARZA 5/5, SILT

## 2022-07-19 NOTE — CONSULT NOTE ADULT - TIME BILLING
agree with above  83 y/o female (former smoker) with PMHx of HTN, CAD sp PCi,  HLD, MI, peripheral neuropathy, DM and peripheral artery disease presented with abnl imaging on MRI as outpt      #Brain/ Lung  mass   -CT head/ CT chest noted   -work up hem/onc   -neurosx fu   -pending MRI brain  -pending IR for biopsy lung lesion  -Dex 4q6 per neuro sx   -pulm fu     #CAD sp PCI   -resume ASA once cleared by IR -- currently on hold for Bx  -check ecg     dvt ppx

## 2022-07-19 NOTE — H&P ADULT - ASSESSMENT
83 y/o female (former smoker) with PMHx of HTN, CAD, HLD, MI, peripheral neuropathy, DM and peripheral artery disease presented to the ED for imaging for her head due to abnormal spine MRI by her orthopedist (Dr. Castro) during workup for her spine for her chronic back problems. The orthopedist noted something in her brain. Otherwise pt has no symptoms, stated that since 04/2022 she has had gait/balance issues. Endorsed that if she sits for a while and gets up, she feels like she is "drunk" and feels off. Denied chest pain, SOB, fevers, chills, numbness/paresthesias, muscular weakness, dizziness, abdominal pain, headaches.  83 y/o female (former smoker) with PMHx of HTN, CAD, HLD, MI, peripheral neuropathy, DM and peripheral artery disease presented to the ED for imaging for her head due to abnormal spine MRI by her orthopedist (Dr. Castro) during workup for her spine for her chronic back problems. The orthopedist noted something in her brain. Otherwise pt has no symptoms, stated that since 04/2022 she has had gait/balance issues. Endorsed that if she sits for a while and gets up, she feels like she is "drunk" and feels off. Denied chest pain, SOB, fevers, chills, numbness/paresthesias, muscular weakness, dizziness, abdominal pain, headaches.    brain mass  - likely metastatic lung cancer  - await mri  - decadron  - keppra  - seen by NS    lung mass  - pulm called  - bronch is scheduled    diabetes  - hold oral hypoglycemics  - insulin ss  - hgb a1c  - elevated sugars are expected  - endo consult    HTN  -  cont home meds    cad  - s/p stent  - hold asa and plavix for poss bx    dvt px  - scd's

## 2022-07-19 NOTE — CONSULT NOTE ADULT - SUBJECTIVE AND OBJECTIVE BOX
[facility] - [facility] - CONSULT NOTE    HPI: 82 year old gentlewoman, former smoker, without history of intrinsic lung disease. He has a history of HTN, HLD, DM, CAD s/p PCI (1990s and 2021) and PAD s/p RLE stenting. The patient has been having ambulating over the last several months due to a sense of imbalance with impaired gait. Outpatient CT revealed a right temporal lobe and a left frontal lobe mass with vasogenic edema in multiple lobes. An MRI of the lumbar spine due back pain revealed multilevel foraminal stenosis/disc bulges. The patient has no shortness of breath or hypoxemia on room air. She has no cough, sputum production, chest congestion or wheeze. No fevers, chills or sweats. No chest pain/pressure or palpitations. Chest radiographs are abnormal. Asked to evaluate.    PMHX:  Hypertension  Hyperlipidemia  Diabetes mellitus  Coronary artery disease  Myocardial infarct  Peripheral artery disease  Peripheral neuropathy    PSHX:  Right lower extremity stenting  Percutaneous coronary intervention      FAMILY HISTORY:  Family history of stroke (Sibling)    SOCIAL HISTORY:  former smoker;     Pulmonary Medications:       Antimicrobials:      Cardiology:      Other:  dexAMETHasone     Tablet 4 milliGRAM(s) Oral every 6 hours  dextrose 5%. 1000 milliLiter(s) IV Continuous <Continuous>  dextrose 5%. 1000 milliLiter(s) IV Continuous <Continuous>  dextrose 50% Injectable 25 Gram(s) IV Push once  dextrose 50% Injectable 12.5 Gram(s) IV Push once  dextrose 50% Injectable 25 Gram(s) IV Push once  glucagon  Injectable 1 milliGRAM(s) IntraMuscular once  insulin lispro (ADMELOG) corrective regimen sliding scale   SubCutaneous three times a day before meals  insulin lispro (ADMELOG) corrective regimen sliding scale   SubCutaneous at bedtime  insulin lispro Injectable (ADMELOG) 2 Unit(s) SubCutaneous three times a day before meals  levETIRAcetam 500 milliGRAM(s) Oral two times a day      Prn:  MEDICATIONS  (PRN):  dextrose Oral Gel 15 Gram(s) Oral once PRN Blood Glucose LESS THAN 70 milliGRAM(s)/deciliter      Allergies    No Known Allergies    HOME MEDICATIONS: see  H & P    REVIEW OF SYSTEMS:  Constitutional: As per HPI  HEENT: Within normal limits  CV: As per HPI  Resp: As per HPI  GI: Within normal limits   : Within normal limits  Musculoskeletal: Within normal limits  Skin: Within normal limits  Neurological: Within normal limits  Psychiatric: Within normal limits  Endocrine: Within normal limits  Hematologic/Lymphatic: Within normal limits  Allergic/Immunologic: Within normal limits    [x] All other systems negative    OBJECTIVE:    I&O's Detail    Daily Height in cm: 165.1 (18 Jul 2022 17:01)      POCT Blood Glucose.: 365 mg/dL (19 Jul 2022 09:20)    PHYSICAL EXAM:  ICU Vital Signs Last 24 Hrs  T(C): 36.6 (19 Jul 2022 07:15), Max: 37.1 (18 Jul 2022 17:01)  T(F): 97.8 (19 Jul 2022 07:15), Max: 98.8 (18 Jul 2022 17:01)  HR: 72 (19 Jul 2022 07:15) (72 - 95)  BP: 144/82 (19 Jul 2022 07:15) (102/61 - 164/83)  BP(mean): --  ABP: --  ABP(mean): --  RR: 18 (19 Jul 2022 07:15) (18 - 20)  SpO2: 96% (19 Jul 2022 07:15) (96% - 99%) on room air    General: Awake. Alert. Cooperative. No distress. Appears stated age 	  HEENT:   Atraumatic. Normocephalic. Anicteric. Normal oral mucosa. PERRL. EOMI.  Neck: Supple. Trachea midline. Thyroid without enlargement/tenderness/nodules. No carotid bruit. No JVD.	  Cardiovascular: Regular rate and rhythm. S1 S2 normal. No murmurs, rubs or gallops.  Respiratory: Respirations unlabored. Clear to auscultation and percussion bilaterally. No curvature.  Abdomen: Soft. Non-tender. Non-distended. No organomegaly. No masses. Normal bowel sounds.  Extremities: Warm to touch. No clubbing or cyanosis. No pedal edema.  Pulses: 2+ peripheral pulses all extremities.	  Skin: Normal skin color. No rashes or lesions. No ecchymoses. No cyanosis. Warm to touch.  Lymph Nodes: Cervical, supraclavicular and axillary nodes normal  Neurological: Motor and sensory examination equal and normal. A and O x 3  Psychiatry: Appropriate mood and affect.      LABS:                          13.8   9.73  )-----------( 387      ( 18 Jul 2022 21:10 )             43.2     CBC    WBC  9.73 <==    Hemoglobin  13.8 <<==    Hematocrit  43.2 <==    Platelets  387 <==      142  |  103  |  29<H>  ----------------------------<  180<H>    07-18  3.8   |  25  |  0.90    LYTES    sodium  142 <==    potassium   3.8 <==    chloride  103 <==    carbon dioxide  25 <==    =============================================================================================  RENAL FUNCTION:    Creatinine:   0.90  <<==    BUN:   29 <==    ============================================================================================    calcium   10.3 <==    ============================================================================================  LFTs    AST:   16 <==     ALT:  9  <==     AP:  96  <=    Bili:  0.4  <=    PT/INR - ( 18 Jul 2022 21:10 )   PT: 11.8 sec;   INR: 1.03 ratio       PTT - ( 18 Jul 2022 21:10 )  PTT:29.2 sec    MICROBIOLOGY:     COVID-19 PCR . (07.18.22 @ 23:25)   COVID-19 PCR: [facility]    RADIOLOGY:  [x ] Chest radiographs reviewed and interpreted by me    EXAM:  CT ABDOMEN AND PELVIS IC                        EXAM:  CT CHEST IC                          PROCEDURE DATE:  07/18/2022      FINDINGS:  CHEST:  LUNGS AND LARGE AIRWAYS: There is a heterogeneous left upper lobe mass   approximately measuring 3.3 x 2.6 cm (2, 28) obstructing the left apical   posterior bronchus with partial atelectasis of the left upper lobe. Few   scattered bilateral pulmonary nodules measuring up to 3 mm in the right   upper lobe (2, 39).  PLEURA: No pleural effusion.  VESSELS: Atherosclerotic changes of the aorta and coronary arteries.  HEART: Heart size is normal. No pericardial effusion.  MEDIASTINUM AND SIXTO: Soft tissue contiguous with mass versus adenopathy   measuring 1.7 x 1.5 cm (2, 33).  CHEST WALL AND LOWER NECK: Bilateral subcentimeter hypoattenuating   thyroid nodules.    ABDOMEN AND PELVIS:  LIVER: Within normal limits.  BILE DUCTS: Normal caliber.  GALLBLADDER: Within normal limits.  SPLEEN: Within normal limits.  PANCREAS: Within normal limits.  ADRENALS: Indeterminant left adrenal nodule measuring 1.2 cm.  KIDNEYS/URETERS: No hydronephrosis. Renal cysts and subcentimeter   hypoattenuating foci bilaterally, too small to characterize.    BLADDER: Within normal limits.  REPRODUCTIVE ORGANS: Uterus and adnexa within normal limits.    BOWEL: Colonic diverticula withoutevidence of acute diverticulitis. No   bowel obstruction. Appendix is normal.  PERITONEUM: No ascites.  VESSELS: Atherosclerotic changes.  RETROPERITONEUM/LYMPH NODES: No lymphadenopathy.  ABDOMINAL WALL: Within normal limits.  BONES: Degenerative changes. Grade 1 anterolisthesis of L5 on S1. Status   post right shoulder arthroplasty.    IMPRESSION:  Left upper lobe lung mass with partial atelectasis of the left upper   lobe, concerning for primary lung neoplasm. Few scattered sub-4 mm   pulmonary nodules, indeterminate.    Indeterminant left adrenal nodule for which contrast enhanced MRI is   recommended for further evaluation.    DEEPAK HARRINGTON MD; Resident Radiologist  This document has been electronically signed.  HAYLEY DENTON MD; Attending Radiologist  This document has been electronically signed. Jul 19 2022  9:02AM  ---------------------------------------------------------------------------------------------------------------  EXAM:  CT BRAIN                          PROCEDURE DATE:  07/18/2022      FINDINGS:  A cystic versus centrally necrotic mass in the right temporal lobe   measures approximately 4.8 x 3.2 x 3.2 cm (602:15 and 601:33).    There moderate tolarge amount of vasogenic edema in the right frontal,   parietal and temporal lobes.  There is regional mass effect in the right   cerebral hemisphere with partial effacement of the right lateral   ventricle and third ventricle.  There is medial bulging of the right   thalamus across the midline by up to 1.4 cm (2:17).  There is   approximately 5 mm subfalcine subfalcine herniation of the right frontal   lobe underneath the cerebral falx (2:21).    There is an approximately 1.4 x 1.3 x 1.6 cm cystic versus centimeter   necrotic mass in the medial left frontal lobe.  There appears to be trace   edema surrounding this lesion, without significant mass effect.    There is no evidence of acute intracranial hemorrhage, transtentorial   herniation or acute territorial infarct.  Mild prominence the left   lateral ventricle may be related to pre-existing cerebral volume loss   rather than hydrocephalus.    The paranasal sinuses and mastoids are grossly clear.    The patient is status post cataractlens placement surgery bilaterally.    The calvarium and skull base appear within normal limits.    IMPRESSION:  Cystic versus a necrotic masses measure approximately 4.8 x 3.2 x 3.2 cm   in the right temporal lobe and 1.4 x 1.3 x 1.6 cm in the medial left   frontal lobe.    Moderate to large amount of vasogenic edema in the right frontal,   parietal and temporal lobes.  Trace edema in the left frontal lobe.    Regional mass effect in the right cerebral hemisphere with partial   effacement of theventricular system and 5 mm subfalcine herniation of   the right frontal lobe underneath the cerebral falx.    The findings may represent intracranial metastatic disease versus   glioblastoma.  Contrast-enhanced brain MRI is recommended for further   evaluation.      Findings discussed with DAVID Albarran by Dr. Harrington at 2153 hours on   7/8/2022 with readback confirmation.    DEEPAK HARRINGTON MD; Resident Radiologist  This document has been electronically signed.  PHILLY CISSE MD; Attending Radiologist  This document has been electronically signed. Jul 18 2022 10:15PM  ---------------------------------------------------------------------------------------------------------------             [facility] - [facility] - CONSULT NOTE    HPI: 82 year old gentlewoman, former smoker, without history of intrinsic lung disease. She has a history of HTN, HLD, DM, CAD s/p PCI (1990s and 2021) and PAD s/p RLE stenting. The patient has been having difficulty ambulating over the last several months due to a sense of imbalance with impaired gait. Her family has noted a change in her personality with pressured speech and insomnia. Outpatient cervical MRI incidentally note a CNS lesion. Lumbar spine MRI performed due to chronic back pain revealed multilevel foraminal stenosis/disc bulges. 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 The patient has no shortness of breath or hypoxemia on room air. She has no cough, sputum production, chest congestion or wheeze. No fevers, chills or sweats. No chest pain/pressure or palpitations. Chest radiographs are abnormal. Asked to evaluate.    PMHX:  Hypertension  Hyperlipidemia  Diabetes mellitus  Coronary artery disease  Myocardial infarct  Peripheral artery disease  Peripheral neuropathy    PSHX:  Right lower extremity stenting  Bilateral carotid endarterectomy  Percutaneous coronary intervention  Right shoulder replacement    FAMILY HISTORY:  Family history of stroke (Sibling)    SOCIAL HISTORY:  former smoker; ; retired    Pulmonary Medications:       Antimicrobials:      Cardiology:      Other:  dexAMETHasone     Tablet 4 milliGRAM(s) Oral every 6 hours  dextrose 5%. 1000 milliLiter(s) IV Continuous <Continuous>  dextrose 5%. 1000 milliLiter(s) IV Continuous <Continuous>  dextrose 50% Injectable 25 Gram(s) IV Push once  dextrose 50% Injectable 12.5 Gram(s) IV Push once  dextrose 50% Injectable 25 Gram(s) IV Push once  glucagon  Injectable 1 milliGRAM(s) IntraMuscular once  insulin lispro (ADMELOG) corrective regimen sliding scale   SubCutaneous three times a day before meals  insulin lispro (ADMELOG) corrective regimen sliding scale   SubCutaneous at bedtime  insulin lispro Injectable (ADMELOG) 2 Unit(s) SubCutaneous three times a day before meals  levETIRAcetam 500 milliGRAM(s) Oral two times a day      Prn:  MEDICATIONS  (PRN):  dextrose Oral Gel 15 Gram(s) Oral once PRN Blood Glucose LESS THAN 70 milliGRAM(s)/deciliter      Allergies    No Known Allergies    HOME MEDICATIONS: see  H & P    REVIEW OF SYSTEMS:  Constitutional: As per HPI  HEENT: Within normal limits  CV: As per HPI  Resp: As per HPI  GI: Within normal limits   : Within normal limits  Musculoskeletal: Within normal limits  Skin: Within normal limits  Neurological: ataxia - pressure speech  Psychiatric: Within normal limits  Endocrine: Within normal limits  Hematologic/Lymphatic: Within normal limits  Allergic/Immunologic: Within normal limits    [x] All other systems negative    OBJECTIVE:    Daily Height in cm: 165.1 (18 Jul 2022 17:01)      POCT Blood Glucose.: 365 mg/dL (19 Jul 2022 09:20)    PHYSICAL EXAM:  ICU Vital Signs Last 24 Hrs  T(C): 36.6 (19 Jul 2022 07:15), Max: 37.1 (18 Jul 2022 17:01)  T(F): 97.8 (19 Jul 2022 07:15), Max: 98.8 (18 Jul 2022 17:01)  HR: 72 (19 Jul 2022 07:15) (72 - 95)  BP: 144/82 (19 Jul 2022 07:15) (102/61 - 164/83)  BP(mean): --  ABP: --  ABP(mean): --  RR: 18 (19 Jul 2022 07:15) (18 - 20)  SpO2: 96% (19 Jul 2022 07:15) (96% - 99%) on room air    General: Awake. Alert. Cooperative. No distress. Appears stated age. Loquacious. Pressured speech.	  HEENT:  Atraumatic. Normocephalic. Anicteric. Normal oral mucosa. PERRL. EOMI.  Neck: Supple. Trachea midline. Thyroid without enlargement/tenderness/nodules. No carotid bruit. No JVD.	  Cardiovascular: Regular rate and rhythm. S1 S2 normal. No murmurs, rubs or gallops.  Respiratory: Respirations unlabored. Clear to auscultation and percussion bilaterally. No curvature.  Abdomen: Soft. Non-tender. Non-distended. No organomegaly. No masses. Normal bowel sounds.  Extremities: Warm to touch. No clubbing or cyanosis. No pedal edema.  Pulses: 2+ peripheral pulses all extremities.	  Skin: Normal skin color. No rashes or lesions. No ecchymoses. No cyanosis. Warm to touch.  Lymph Nodes: Cervical, supraclavicular and axillary nodes normal  Neurological: Ataxic gait. A and O x 3  Psychiatry: Appropriate mood and affect.      LABS:                          13.8   9.73  )-----------( 387      ( 18 Jul 2022 21:10 )             43.2     CBC    WBC  9.73 <==    Hemoglobin  13.8 <<==    Hematocrit  43.2 <==    Platelets  387 <==      142  |  103  |  29<H>  ----------------------------<  180<H>    07-18  3.8   |  25  |  0.90    LYTES    sodium  142 <==    potassium   3.8 <==    chloride  103 <==    carbon dioxide  25 <==    =============================================================================================  RENAL FUNCTION:    Creatinine:   0.90  <<==    BUN:   29 <==    ============================================================================================    calcium   10.3 <==    ============================================================================================  LFTs    AST:   16 <==     ALT:  9  <==     AP:  96  <=    Bili:  0.4  <=    PT/INR - ( 18 Jul 2022 21:10 )   PT: 11.8 sec;   INR: 1.03 ratio       PTT - ( 18 Jul 2022 21:10 )  PTT:29.2 sec    MICROBIOLOGY:     COVID-19 PCR . (07.18.22 @ 23:25)   COVID-19 PCR: [facility]    RADIOLOGY:  [x ] Chest radiographs reviewed and interpreted by me    EXAM:  CT ABDOMEN AND PELVIS IC                        EXAM:  CT CHEST IC                          PROCEDURE DATE:  07/18/2022      FINDINGS:  CHEST:  LUNGS AND LARGE AIRWAYS: There is a heterogeneous left upper lobe mass   approximately measuring 3.3 x 2.6 cm (2, 28) obstructing the left apical   posterior bronchus with partial atelectasis of the left upper lobe. Few   scattered bilateral pulmonary nodules measuring up to 3 mm in the right   upper lobe (2, 39).  PLEURA: No pleural effusion.  VESSELS: Atherosclerotic changes of the aorta and coronary arteries.  HEART: Heart size is normal. No pericardial effusion.  MEDIASTINUM AND SIXTO: Soft tissue contiguous with mass versus adenopathy   measuring 1.7 x 1.5 cm (2, 33).  CHEST WALL AND LOWER NECK: Bilateral subcentimeter hypoattenuating   thyroid nodules.    ABDOMEN AND PELVIS:  LIVER: Within normal limits.  BILE DUCTS: Normal caliber.  GALLBLADDER: Within normal limits.  SPLEEN: Within normal limits.  PANCREAS: Within normal limits.  ADRENALS: Indeterminant left adrenal nodule measuring 1.2 cm.  KIDNEYS/URETERS: No hydronephrosis. Renal cysts and subcentimeter   hypoattenuating foci bilaterally, too small to characterize.    BLADDER: Within normal limits.  REPRODUCTIVE ORGANS: Uterus and adnexa within normal limits.    BOWEL: Colonic diverticula withoutevidence of acute diverticulitis. No   bowel obstruction. Appendix is normal.  PERITONEUM: No ascites.  VESSELS: Atherosclerotic changes.  RETROPERITONEUM/LYMPH NODES: No lymphadenopathy.  ABDOMINAL WALL: Within normal limits.  BONES: Degenerative changes. Grade 1 anterolisthesis of L5 on S1. Status   post right shoulder arthroplasty.    IMPRESSION:  Left upper lobe lung mass with partial atelectasis of the left upper   lobe, concerning for primary lung neoplasm. Few scattered sub-4 mm   pulmonary nodules, indeterminate.    Indeterminant left adrenal nodule for which contrast enhanced MRI is   recommended for further evaluation.    DEEPAK HARRINGTON MD; Resident Radiologist  This document has been electronically signed.  HAYLEY DENTON MD; Attending Radiologist  This document has been electronically signed. Jul 19 2022  9:02AM  ---------------------------------------------------------------------------------------------------------------  EXAM:  CT BRAIN                          PROCEDURE DATE:  07/18/2022      FINDINGS:  A cystic versus centrally necrotic mass in the right temporal lobe   measures approximately 4.8 x 3.2 x 3.2 cm (602:15 and 601:33).    There moderate tolarge amount of vasogenic edema in the right frontal,   parietal and temporal lobes.  There is regional mass effect in the right   cerebral hemisphere with partial effacement of the right lateral   ventricle and third ventricle.  There is medial bulging of the right   thalamus across the midline by up to 1.4 cm (2:17).  There is   approximately 5 mm subfalcine subfalcine herniation of the right frontal   lobe underneath the cerebral falx (2:21).    There is an approximately 1.4 x 1.3 x 1.6 cm cystic versus centimeter   necrotic mass in the medial left frontal lobe.  There appears to be trace   edema surrounding this lesion, without significant mass effect.    There is no evidence of acute intracranial hemorrhage, transtentorial   herniation or acute territorial infarct.  Mild prominence the left   lateral ventricle may be related to pre-existing cerebral volume loss   rather than hydrocephalus.    The paranasal sinuses and mastoids are grossly clear.    The patient is status post cataractlens placement surgery bilaterally.    The calvarium and skull base appear within normal limits.    IMPRESSION:  Cystic versus a necrotic masses measure approximately 4.8 x 3.2 x 3.2 cm   in the right temporal lobe and 1.4 x 1.3 x 1.6 cm in the medial left   frontal lobe.    Moderate to large amount of vasogenic edema in the right frontal,   parietal and temporal lobes.  Trace edema in the left frontal lobe.    Regional mass effect in the right cerebral hemisphere with partial   effacement of theventricular system and 5 mm subfalcine herniation of   the right frontal lobe underneath the cerebral falx.    The findings may represent intracranial metastatic disease versus   glioblastoma.  Contrast-enhanced brain MRI is recommended for further   evaluation.      Findings discussed with DAVID Albarran by Dr. Harrington at 2153 hours on   7/8/2022 with readback confirmation.    DEEPAK HARRINGTON MD; Resident Radiologist  This document has been electronically signed.  PHILLY CISSE MD; Attending Radiologist  This document has been electronically signed. Jul 18 2022 10:15PM  ---------------------------------------------------------------------------------------------------------------

## 2022-07-19 NOTE — H&P ADULT - NSHPLABSRESULTS_GEN_ALL_CORE
LABS:                        13.8   9.73  )-----------( 387      ( 18 Jul 2022 21:10 )             43.2     07-18    142  |  103  |  29<H>  ----------------------------<  180<H>  3.8   |  25  |  0.90    Ca    10.3      18 Jul 2022 21:10    TPro  7.7  /  Alb  4.8  /  TBili  0.4  /  DBili  x   /  AST  16  /  ALT  9<L>  /  AlkPhos  96  07-18    PT/INR - ( 18 Jul 2022 21:10 )   PT: 11.8 sec;   INR: 1.03 ratio         PTT - ( 18 Jul 2022 21:10 )  PTT:29.2 sec          RADIOLOGY & ADDITIONAL TESTS:

## 2022-07-19 NOTE — CONSULT NOTE ADULT - ASSESSMENT
[facility] 6/23/21:   s/p successful angioplasty to the mid RCA followed by intracoronary brachytherapy.  [facility] 4/14/21: POBA of mid RCA (80%)   [facility] 3/25/21: PCI/POBA to pCX 90%     a/p [facility] 6/23/21:   s/p successful angioplasty to the mid RCA followed by intracoronary brachytherapy.  [facility] 4/14/21: POBA of mid RCA (80%)   [facility] 3/25/21: PCI/POBA to pCX 90%     a/p  83 y/o female (former smoker) with PMHx of HTN, CAD sp PCi,  HLD, MI, peripheral neuropathy, DM and peripheral artery disease presented with abnl imaging on MRI as outpt      #Brain  mass   -CT head/ CT chest noted   -work up hem/onc   -neurosx fu   -pending MRI brain  -IR consult to biopsy lung lesion  -Dex 4q6 per neuro sx     #CAD sp PCI   -resume ASA once cleared by IR -- currently on hold for Bx  -check ecg     dvt ppx  [facility] 6/23/21:   s/p successful angioplasty to the mid RCA followed by intracoronary brachytherapy.  [facility] 4/14/21: POBA of mid RCA (80%)   [facility] 3/25/21: PCI/POBA to pCX 90%     a/p  83 y/o female (former smoker) with PMHx of HTN, CAD sp PCi,  HLD, MI, peripheral neuropathy, DM and peripheral artery disease presented with abnl imaging on MRI as outpt      #Brain/ Lung  mass   -CT head/ CT chest noted   -work up hem/onc   -neurosx fu   -pending MRI brain  -pending IR for biopsy lung lesion  -Dex 4q6 per neuro sx   -pulm fu     #CAD sp PCI   -resume ASA once cleared by IR -- currently on hold for Bx  -check ecg     dvt ppx

## 2022-07-20 ENCOUNTER — RESULT REVIEW (OUTPATIENT)
Age: 82
End: 2022-07-20

## 2022-07-20 DIAGNOSIS — R91.8 OTHER NONSPECIFIC ABNORMAL FINDING OF LUNG FIELD: ICD-10-CM

## 2022-07-20 DIAGNOSIS — E04.2 NONTOXIC MULTINODULAR GOITER: ICD-10-CM

## 2022-07-20 DIAGNOSIS — E11.9 TYPE 2 DIABETES MELLITUS WITHOUT COMPLICATIONS: ICD-10-CM

## 2022-07-20 DIAGNOSIS — E27.8 OTHER SPECIFIED DISORDERS OF ADRENAL GLAND: ICD-10-CM

## 2022-07-20 DIAGNOSIS — G93.89 OTHER SPECIFIED DISORDERS OF BRAIN: ICD-10-CM

## 2022-07-20 LAB
A1C WITH ESTIMATED AVERAGE GLUCOSE RESULT: 8.7 % — HIGH (ref 4–5.6)
ANION GAP SERPL CALC-SCNC: 14 MMOL/L — SIGNIFICANT CHANGE UP (ref 5–17)
BUN SERPL-MCNC: 36 MG/DL — HIGH (ref 7–23)
CALCIUM SERPL-MCNC: 9.8 MG/DL — SIGNIFICANT CHANGE UP (ref 8.4–10.5)
CHLORIDE SERPL-SCNC: 104 MMOL/L — SIGNIFICANT CHANGE UP (ref 96–108)
CO2 SERPL-SCNC: 22 MMOL/L — SIGNIFICANT CHANGE UP (ref 22–31)
CREAT SERPL-MCNC: 1.08 MG/DL — SIGNIFICANT CHANGE UP (ref 0.5–1.3)
EGFR: 51 ML/MIN/1.73M2 — LOW
ESTIMATED AVERAGE GLUCOSE: 203 MG/DL — HIGH (ref 68–114)
FOLATE SERPL-MCNC: >20 NG/ML — SIGNIFICANT CHANGE UP
GLUCOSE BLDC GLUCOMTR-MCNC: 253 MG/DL — HIGH (ref 70–99)
GLUCOSE BLDC GLUCOMTR-MCNC: 261 MG/DL — HIGH (ref 70–99)
GLUCOSE BLDC GLUCOMTR-MCNC: 276 MG/DL — HIGH (ref 70–99)
GLUCOSE BLDC GLUCOMTR-MCNC: 370 MG/DL — HIGH (ref 70–99)
GLUCOSE SERPL-MCNC: 328 MG/DL — HIGH (ref 70–99)
GRAM STN FLD: SIGNIFICANT CHANGE UP
HCT VFR BLD CALC: 41.5 % — SIGNIFICANT CHANGE UP (ref 34.5–45)
HGB BLD-MCNC: 13.3 G/DL — SIGNIFICANT CHANGE UP (ref 11.5–15.5)
MAGNESIUM SERPL-MCNC: 1.5 MG/DL — LOW (ref 1.6–2.6)
MCHC RBC-ENTMCNC: 31.4 PG — SIGNIFICANT CHANGE UP (ref 27–34)
MCHC RBC-ENTMCNC: 32 GM/DL — SIGNIFICANT CHANGE UP (ref 32–36)
MCV RBC AUTO: 97.9 FL — SIGNIFICANT CHANGE UP (ref 80–100)
NRBC # BLD: 0 /100 WBCS — SIGNIFICANT CHANGE UP (ref 0–0)
PHOSPHATE SERPL-MCNC: 3.4 MG/DL — SIGNIFICANT CHANGE UP (ref 2.5–4.5)
PLATELET # BLD AUTO: 329 K/UL — SIGNIFICANT CHANGE UP (ref 150–400)
POTASSIUM SERPL-MCNC: 4.7 MMOL/L — SIGNIFICANT CHANGE UP (ref 3.5–5.3)
POTASSIUM SERPL-SCNC: 4.7 MMOL/L — SIGNIFICANT CHANGE UP (ref 3.5–5.3)
RBC # BLD: 4.24 M/UL — SIGNIFICANT CHANGE UP (ref 3.8–5.2)
RBC # FLD: 12.4 % — SIGNIFICANT CHANGE UP (ref 10.3–14.5)
SODIUM SERPL-SCNC: 140 MMOL/L — SIGNIFICANT CHANGE UP (ref 135–145)
SPECIMEN SOURCE: SIGNIFICANT CHANGE UP
TSH SERPL-MCNC: 0.27 UIU/ML — SIGNIFICANT CHANGE UP (ref 0.27–4.2)
VIT B12 SERPL-MCNC: 553 PG/ML — SIGNIFICANT CHANGE UP (ref 232–1245)
WBC # BLD: 11.97 K/UL — HIGH (ref 3.8–10.5)
WBC # FLD AUTO: 11.97 K/UL — HIGH (ref 3.8–10.5)

## 2022-07-20 PROCEDURE — 88360 TUMOR IMMUNOHISTOCHEM/MANUAL: CPT | Mod: 26

## 2022-07-20 PROCEDURE — 88341 IMHCHEM/IMCYTCHM EA ADD ANTB: CPT | Mod: 26,59

## 2022-07-20 PROCEDURE — 88112 CYTOPATH CELL ENHANCE TECH: CPT | Mod: 26

## 2022-07-20 PROCEDURE — 88305 TISSUE EXAM BY PATHOLOGIST: CPT | Mod: 26

## 2022-07-20 PROCEDURE — 70553 MRI BRAIN STEM W/O & W/DYE: CPT | Mod: 26

## 2022-07-20 PROCEDURE — 88342 IMHCHEM/IMCYTCHM 1ST ANTB: CPT | Mod: 26,59

## 2022-07-20 DEVICE — PACK THORACENTESIS CHG: Type: IMPLANTABLE DEVICE | Status: FUNCTIONAL

## 2022-07-20 RX ORDER — CHLORHEXIDINE GLUCONATE 213 G/1000ML
1 SOLUTION TOPICAL DAILY
Refills: 0 | Status: DISCONTINUED | OUTPATIENT
Start: 2022-07-20 | End: 2022-07-27

## 2022-07-20 RX ORDER — INSULIN LISPRO 100/ML
8 VIAL (ML) SUBCUTANEOUS
Refills: 0 | Status: DISCONTINUED | OUTPATIENT
Start: 2022-07-20 | End: 2022-07-21

## 2022-07-20 RX ORDER — INSULIN GLARGINE 100 [IU]/ML
26 INJECTION, SOLUTION SUBCUTANEOUS AT BEDTIME
Refills: 0 | Status: DISCONTINUED | OUTPATIENT
Start: 2022-07-20 | End: 2022-07-21

## 2022-07-20 RX ORDER — SODIUM CHLORIDE 9 MG/ML
500 INJECTION INTRAMUSCULAR; INTRAVENOUS; SUBCUTANEOUS
Refills: 0 | Status: COMPLETED | OUTPATIENT
Start: 2022-07-20 | End: 2022-07-20

## 2022-07-20 RX ORDER — MAGNESIUM SULFATE 500 MG/ML
2 VIAL (ML) INJECTION ONCE
Refills: 0 | Status: COMPLETED | OUTPATIENT
Start: 2022-07-20 | End: 2022-07-20

## 2022-07-20 RX ADMIN — Medication 10: at 10:00

## 2022-07-20 RX ADMIN — LEVETIRACETAM 500 MILLIGRAM(S): 250 TABLET, FILM COATED ORAL at 05:22

## 2022-07-20 RX ADMIN — SODIUM CHLORIDE 30 MILLILITER(S): 9 INJECTION INTRAMUSCULAR; INTRAVENOUS; SUBCUTANEOUS at 18:48

## 2022-07-20 RX ADMIN — Medication 25 MILLIGRAM(S): at 05:23

## 2022-07-20 RX ADMIN — Medication 25 GRAM(S): at 18:48

## 2022-07-20 RX ADMIN — Medication 2: at 21:34

## 2022-07-20 RX ADMIN — ATORVASTATIN CALCIUM 40 MILLIGRAM(S): 80 TABLET, FILM COATED ORAL at 21:33

## 2022-07-20 RX ADMIN — Medication 4 MILLIGRAM(S): at 19:05

## 2022-07-20 RX ADMIN — Medication 8 UNIT(S): at 18:47

## 2022-07-20 RX ADMIN — INSULIN GLARGINE 26 UNIT(S): 100 INJECTION, SOLUTION SUBCUTANEOUS at 21:33

## 2022-07-20 RX ADMIN — Medication 50 MILLIGRAM(S): at 19:06

## 2022-07-20 RX ADMIN — Medication 6: at 18:47

## 2022-07-20 RX ADMIN — Medication 4 MILLIGRAM(S): at 05:22

## 2022-07-20 RX ADMIN — Medication 25 GRAM(S): at 09:28

## 2022-07-20 RX ADMIN — GABAPENTIN 200 MILLIGRAM(S): 400 CAPSULE ORAL at 21:33

## 2022-07-20 RX ADMIN — LOSARTAN POTASSIUM 100 MILLIGRAM(S): 100 TABLET, FILM COATED ORAL at 05:23

## 2022-07-20 RX ADMIN — LEVETIRACETAM 500 MILLIGRAM(S): 250 TABLET, FILM COATED ORAL at 19:04

## 2022-07-20 RX ADMIN — CHLORHEXIDINE GLUCONATE 1 APPLICATION(S): 213 SOLUTION TOPICAL at 19:15

## 2022-07-20 NOTE — PROGRESS NOTE ADULT - SUBJECTIVE AND OBJECTIVE BOX
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; ataxia; frontal lobe syndrome    INTERVAL HISTORY: remains in the ER awaiting a bronchoscopy; no shortness of breath or hypoxemia on room air; no cough, sputum production, hemoptysis, chest congestion or wheeze; no fevers, chills or sweats; no chest pain/pressure or palpitations; she does not know if her gait has improved as she has not got off her stretcher      REVIEW OF SYSTEMS:  Constitutional: As per interval history  HEENT: Within normal limits  CV: As per interval history  Resp: As per interval history  GI: Within normal limits   : Within normal limits  Musculoskeletal: Within normal limits  Skin: Within normal limits  Neurological: ataxia - pressured speech  Psychiatric: Within normal limits  Endocrine: Within normal limits  Hematologic/Lymphatic: Within normal limits  Allergic/Immunologic: Within normal limits    MEDICATIONS:     Pulmonary "    Anti-microbials:    Cardiovascular:  hydrochlorothiazide 25 milliGRAM(s) Oral daily  losartan 100 milliGRAM(s) Oral daily  metoprolol tartrate 50 milliGRAM(s) Oral three times a day    Other:  atorvastatin 40 milliGRAM(s) Oral at bedtime  dexAMETHasone     Tablet 4 milliGRAM(s) Oral every 6 hours  dextrose 5%. 1000 milliLiter(s) IV Continuous <Continuous>  dextrose 5%. 1000 milliLiter(s) IV Continuous <Continuous>  dextrose 50% Injectable 25 Gram(s) IV Push once  dextrose 50% Injectable 12.5 Gram(s) IV Push once  dextrose 50% Injectable 25 Gram(s) IV Push once  gabapentin 200 milliGRAM(s) Oral at bedtime  glucagon  Injectable 1 milliGRAM(s) IntraMuscular once  insulin glargine Injectable (LANTUS) 26 Unit(s) SubCutaneous at bedtime  insulin lispro (ADMELOG) corrective regimen sliding scale   SubCutaneous three times a day before meals  insulin lispro (ADMELOG) corrective regimen sliding scale   SubCutaneous at bedtime  insulin lispro Injectable (ADMELOG) 8 Unit(s) SubCutaneous three times a day before meals  levETIRAcetam 500 milliGRAM(s) Oral two times a day  magnesium sulfate  IVPB 2 Gram(s) IV Intermittent once  sodium chloride 0.9%. 500 milliLiter(s) IV Continuous <Continuous>    MEDICATIONS  (PRN):  dextrose Oral Gel 15 Gram(s) Oral once PRN Blood Glucose LESS THAN 70 milliGRAM(s)/deciliter    OBJECTIVE:    POCT Blood Glucose.: 370 mg/dL (20 Jul 2022 09:49)  POCT Blood Glucose.: 394 mg/dL (19 Jul 2022 21:27)  POCT Blood Glucose.: 241 mg/dL (19 Jul 2022 15:51)  POCT Blood Glucose.: 299 mg/dL (19 Jul 2022 12:13)      PHYSICAL EXAM:       ICU Vital Signs Last 24 Hrs  T(C): 36.2 (20 Jul 2022 11:26), Max: 36.8 (20 Jul 2022 10:58)  T(F): 97.2 (20 Jul 2022 11:26), Max: 98.2 (20 Jul 2022 10:58)  HR: 58 (20 Jul 2022 11:26) (55 - 82)  BP: 131/59 (20 Jul 2022 11:26) (108/64 - 136/70)  BP(mean): --  ABP: --  ABP(mean): --  RR: 17 (20 Jul 2022 11:26) (17 - 18)  SpO2: 99% (20 Jul 2022 11:26) (95% - 99%) on room air    General: Awake. Alert. Cooperative. No distress. Appears stated age. Loquacious. Pressured speech.	  HEENT:  Atraumatic. Normocephalic. Anicteric. Normal oral mucosa. PERRL. EOMI.  Neck: Supple. Trachea midline. Thyroid without enlargement/tenderness/nodules. No carotid bruit. No JVD.	  Cardiovascular: Regular rate and rhythm. S1 S2 normal. No murmurs, rubs or gallops.  Respiratory: Respirations unlabored. Clear to auscultation and percussion bilaterally. No curvature.  Abdomen: Soft. Non-tender. Non-distended. No organomegaly. No masses. Normal bowel sounds.  Extremities: Warm to touch. No clubbing or cyanosis. No pedal edema.  Pulses: 2+ peripheral pulses all extremities.	  Skin: Normal skin color. No rashes or lesions. No ecchymoses. No cyanosis. Warm to touch.  Lymph Nodes: Cervical, supraclavicular and axillary nodes normal  Neurological: Ataxic gait. A and O x 3  Psychiatry: Appropriate mood and affect.    LABS:                          13.3   11.97 )-----------( 325 ( 20 Jul 2022 06:50 )             41.5     CBC    WBC  11.97 <==, 9.73 <==    Hemoglobin  13.3 <<==, 13.8 <<==    Hematocrit  41.5 <==, 43.2 <==    Platelets  329 <==, 387 <==      140  |  104  |  36<H>  ----------------------------<  328<H>    07-20  4.7   |  22  |  1.08      LYTES    sodium  140 <==, 142 <==    potassium   4.7 <==, 3.8 <==    chloride  104 <==, 103 <==    carbon dioxide  22 <==, 25 <==    =============================================================================================  RENAL FUNCTION:    Creatinine:   1.08  <<==, 0.90  <<==    BUN:   36 <==, 29 <==    ============================================================================================    calcium   9.8 <==, 10.3 <==    phos   3.4 <==    mag   1.5 <==    ============================================================================================  LFTs    AST:   16 <==     ALT:  9  <==     AP:  96  <=    Bili:  0.4  <=      PT/INR - ( 18 Jul 2022 21:10 )   PT: 11.8 sec;   INR: 1.03 ratio       PTT - ( 18 Jul 2022 21:10 )  PTT: 29.2 sec    MICROBIOLOGY:     COVID-19 PCR . (07.18.22 @ 23:25)   [facility]    RADIOLOGY:  [x ] Chest radiographs reviewed and interpreted by me    EXAM:  CT ABDOMEN AND PELVIS IC                        EXAM:  CT CHEST IC                          PROCEDURE DATE:  07/18/2022      FINDINGS:  CHEST:  LUNGS AND LARGE AIRWAYS: There is a heterogeneous left upper lobe mass   approximately measuring 3.3 x 2.6 cm (2, 28) obstructing the left apical   posterior bronchus with partial atelectasis of the left upper lobe. Few   scattered bilateral pulmonary nodules measuring up to 3 mm in the right   upper lobe (2, 39).  PLEURA: No pleural effusion.  VESSELS: Atherosclerotic changes of the aorta and coronary arteries.  HEART: Heart size is normal. No pericardial effusion.  MEDIASTINUM AND SIXTO: Soft tissue contiguous with mass versus adenopathy   measuring 1.7 x 1.5 cm (2, 33).  CHEST WALL AND LOWER NECK: Bilateral subcentimeter hypoattenuating   thyroid nodules.    ABDOMEN AND PELVIS:  LIVER: Within normal limits.  BILE DUCTS: Normal caliber.  GALLBLADDER: Within normal limits.  SPLEEN: Within normal limits.  PANCREAS: Within normal limits.  ADRENALS: Indeterminant left adrenal nodule measuring 1.2 cm.  KIDNEYS/URETERS: No hydronephrosis. Renal cysts and subcentimeter   hypoattenuating foci bilaterally, too small to characterize.    BLADDER: Within normal limits.  REPRODUCTIVE ORGANS: Uterus and adnexa within normal limits.    BOWEL: Colonic diverticula withoutevidence of acute diverticulitis. No   bowel obstruction. Appendix is normal.  PERITONEUM: No ascites.  VESSELS: Atherosclerotic changes.  RETROPERITONEUM/LYMPH NODES: No lymphadenopathy.  ABDOMINAL WALL: Within normal limits.  BONES: Degenerative changes. Grade 1 anterolisthesis of L5 on S1. Status   post right shoulder arthroplasty.    IMPRESSION:  Left upper lobe lung mass with partial atelectasis of the left upper   lobe, concerning for primary lung neoplasm. Few scattered sub-4 mm   pulmonary nodules, indeterminate.    Indeterminant left adrenal nodule for which contrast enhanced MRI is   recommended for further evaluation.    DEEPAK HARRINGTON MD; Resident Radiologist  This document has been electronically signed.  HAYLEY DENTON MD; Attending Radiologist  This document has been electronically signed. Jul 19 2022  9:02AM  ---------------------------------------------------------------------------------------------------------------  EXAM:  CT BRAIN                          PROCEDURE DATE:  07/18/2022      FINDINGS:  A cystic versus centrally necrotic mass in the right temporal lobe   measures approximately 4.8 x 3.2 x 3.2 cm (602:15 and 601:33).    There moderate tolarge amount of vasogenic edema in the right frontal,   parietal and temporal lobes.  There is regional mass effect in the right   cerebral hemisphere with partial effacement of the right lateral   ventricle and third ventricle.  There is medial bulging of the right   thalamus across the midline by up to 1.4 cm (2:17).  There is   approximately 5 mm subfalcine subfalcine herniation of the right frontal   lobe underneath the cerebral falx (2:21).    There is an approximately 1.4 x 1.3 x 1.6 cm cystic versus centimeter   necrotic mass in the medial left frontal lobe.  There appears to be trace   edema surrounding this lesion, without significant mass effect.    There is no evidence of acute intracranial hemorrhage, transtentorial   herniation or acute territorial infarct.  Mild prominence the left   lateral ventricle may be related to pre-existing cerebral volume loss   rather than hydrocephalus.    The paranasal sinuses and mastoids are grossly clear.    The patient is status post cataractlens placement surgery bilaterally.    The calvarium and skull base appear within normal limits.    IMPRESSION:  Cystic versus a necrotic masses measure approximately 4.8 x 3.2 x 3.2 cm   in the right temporal lobe and 1.4 x 1.3 x 1.6 cm in the medial left   frontal lobe.    Moderate to large amount of vasogenic edema in the right frontal,   parietal and temporal lobes.  Trace edema in the left frontal lobe.    Regional mass effect in the right cerebral hemisphere with partial   effacement of theventricular system and 5 mm subfalcine herniation of   the right frontal lobe underneath the cerebral falx.    The findings may represent intracranial metastatic disease versus   glioblastoma.  Contrast-enhanced brain MRI is recommended for further   evaluation.      Findings discussed with DAVID Albararn by Dr. Harrington at 2153 hours on   7/8/2022 with readback confirmation.    DEEPAK HARRINGTON MD; Resident Radiologist  This document has been electronically signed.  PHILLY CISSE MD; Attending Radiologist  This document has been electronically signed. Jul 18 2022 10:15PM  ---------------------------------------------------------------------------------------------------------------

## 2022-07-20 NOTE — PROGRESS NOTE ADULT - ASSESSMENT
ASSESSMENT:    82 year old gentlewoman, former smoker, without history of intrinsic lung disease. She has a history of HTN, HLD, DM, CAD s/p PCI (1990s and 2021) and PAD s/p RLE stenting. The patient has been having difficulty ambulating over the last several months due to a sense of imbalance with impaired gait. Her family has noted a change in her personality with pressured speech and insomnia. Outpatient cervical MRI incidentally note a CNS lesion. Lumbar spine MRI performed due to chronic back pain revealed multilevel foraminal stenosis/disc bulges. 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 The patient has no shortness of breath or hypoxemia on room air. She has no cough, sputum production, chest congestion or wheeze. No fevers, chills or sweats. No chest pain/pressure or palpitations.     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 presenting with several months of ataxia - frontal lobe lesions have lead to decreased inhibition as well as pressured and loquacious speech      PLAN/RECOMMENDATIONS:    stable oxygenation on room air  no indication for pulmonary medications or antibiotics  bronchoscopy for BAL, endobronchial brushings and biopsy today - no objection to discharge following the procedure if "cleared by neurosurgery)  neurosurgery evaluation noted    no emergent surgery indicated    awaiting MRI brain - can be done in the outpatient setting    no anticoagulation    prophylactic anticonvulsants -> keppra    decadron 4mg q6h po -> follow fingerstick glucoses and treat steroid induced hyperglycemia - her endocrinologist will need to adjust her insulin regimen    radiation oncology evaluation -> can be done in the outpatient setting  cardiology     ASA and plavix are currently on hold - patient had PCI last year    continue lipitor/lopressor/losartan/HCTZ    Will follow with you. Plan of care discussed with the patient and her daughter-in-law at bedside, her son by phone and with Dr. Garza.    Follow-up oncology care will be provided by Dr. Yaron Alexander who will arrange a radiation oncology evaluation in the outpatient setting and start therapy when biopsy results are available    Satish Hopkins MD, [facility]  [phone]  Pulmonary Medicine

## 2022-07-20 NOTE — PROGRESS NOTE ADULT - SUBJECTIVE AND OBJECTIVE BOX
DATE OF SERVICE: 07-20-22 @ 13:52    Patient is a 82y old  Female who presents with a chief complaint of abnormal imaging (20 Jul 2022 07:55)      SUBJECTIVE / OVERNIGHT EVENTS:  No chest pain. No shortness of breath. No complaints. No events overnight.     MEDICATIONS  (STANDING):  atorvastatin 40 milliGRAM(s) Oral at bedtime  dexAMETHasone     Tablet 4 milliGRAM(s) Oral every 6 hours  dextrose 5%. 1000 milliLiter(s) (50 mL/Hr) IV Continuous <Continuous>  dextrose 5%. 1000 milliLiter(s) (100 mL/Hr) IV Continuous <Continuous>  dextrose 50% Injectable 25 Gram(s) IV Push once  dextrose 50% Injectable 12.5 Gram(s) IV Push once  dextrose 50% Injectable 25 Gram(s) IV Push once  gabapentin 200 milliGRAM(s) Oral at bedtime  glucagon  Injectable 1 milliGRAM(s) IntraMuscular once  hydrochlorothiazide 25 milliGRAM(s) Oral daily  insulin glargine Injectable (LANTUS) 26 Unit(s) SubCutaneous at bedtime  insulin lispro (ADMELOG) corrective regimen sliding scale   SubCutaneous three times a day before meals  insulin lispro (ADMELOG) corrective regimen sliding scale   SubCutaneous at bedtime  insulin lispro Injectable (ADMELOG) 8 Unit(s) SubCutaneous three times a day before meals  levETIRAcetam 500 milliGRAM(s) Oral two times a day  losartan 100 milliGRAM(s) Oral daily  magnesium sulfate  IVPB 2 Gram(s) IV Intermittent once  metoprolol tartrate 50 milliGRAM(s) Oral three times a day  sodium chloride 0.9%. 500 milliLiter(s) (30 mL/Hr) IV Continuous <Continuous>    MEDICATIONS  (PRN):  dextrose Oral Gel 15 Gram(s) Oral once PRN Blood Glucose LESS THAN 70 milliGRAM(s)/deciliter      Vital Signs Last 24 Hrs  T(C): 36.6 (20 Jul 2022 13:00), Max: 36.8 (20 Jul 2022 10:58)  T(F): 97.9 (20 Jul 2022 13:00), Max: 98.2 (20 Jul 2022 10:58)  HR: 67 (20 Jul 2022 13:30) (55 - 71)  BP: 116/51 (20 Jul 2022 13:30) (104/53 - 136/70)  BP(mean): --  RR: 14 (20 Jul 2022 13:30) (14 - 18)  SpO2: 93% (20 Jul 2022 13:30) (93% - 99%)    Parameters below as of 20 Jul 2022 13:30  Patient On (Oxygen Delivery Method): room air      CAPILLARY BLOOD GLUCOSE      POCT Blood Glucose.: 370 mg/dL (20 Jul 2022 09:49)  POCT Blood Glucose.: 394 mg/dL (19 Jul 2022 21:27)  POCT Blood Glucose.: 241 mg/dL (19 Jul 2022 15:51)    I&O's Summary      PHYSICAL EXAM:  GENERAL: NAD, well-developed  HEAD:  Atraumatic, Normocephalic  EYES: EOMI, PERRLA, conjunctiva and sclera clear  NECK: Supple, No JVD  CHEST/LUNG: Clear to auscultation bilaterally; No wheeze  HEART: Regular rate and rhythm; No murmurs, rubs, or gallops  ABDOMEN: Soft, Nontender, Nondistended; Bowel sounds present  EXTREMITIES:  2+ Peripheral Pulses, No clubbing, cyanosis, or edema  PSYCH: AAOx3  NEUROLOGY: non-focal  SKIN: No rashes or lesions    LABS:                        13.3   11.97 )-----------( 329      ( 20 Jul 2022 06:50 )             41.5     07-20    140  |  104  |  36<H>  ----------------------------<  328<H>  4.7   |  22  |  1.08    Ca    9.8      20 Jul 2022 06:50  Phos  3.4     07-20  Mg     1.5     07-20    TPro  7.7  /  Alb  4.8  /  TBili  0.4  /  DBili  x   /  AST  16  /  ALT  9<L>  /  AlkPhos  96  07-18    PT/INR - ( 18 Jul 2022 21:10 )   PT: 11.8 sec;   INR: 1.03 ratio         PTT - ( 18 Jul 2022 21:10 )  PTT:29.2 sec          RADIOLOGY & ADDITIONAL TESTS:    Imaging Personally Reviewed:    Consultant(s) Notes Reviewed:      Care Discussed with Consultants/Other Providers:

## 2022-07-20 NOTE — PROGRESS NOTE ADULT - ASSESSMENT
[facility] 6/23/21:   s/p successful angioplasty to the mid RCA followed by intracoronary brachytherapy.  [facility] 4/14/21: POBA of mid RCA (80%)   [facility] 3/25/21: PCI/POBA to pCX 90%     a/p  81 y/o female (former smoker) with PMHx of HTN, CAD sp PCi,  HLD, MI, peripheral neuropathy, DM and peripheral artery disease presented with abnl imaging on MRI as outpt      #Brain/ Lung  mass   -CT head/ CT chest noted   -work up hem/onc   -neurosx fu   -pending MRI brain   -IR for biopsy lung lesion  -Dex 4q6 per neuro sx   -pulm fu     #CAD sp PCI   -resume ASA once cleared by IR -- currently on hold for Bx  -check ecg     dvt ppx

## 2022-07-20 NOTE — PROGRESS NOTE ADULT - ASSESSMENT
83 y/o female (former smoker) with PMHx of HTN, CAD, HLD, MI, peripheral neuropathy, DM and peripheral artery disease presented to the ED for imaging for her head due to abnormal spine MRI by her orthopedist (Dr. Castro) during workup for her spine for her chronic back problems. The orthopedist noted something in her brain. Otherwise pt has no symptoms, stated that since 04/2022 she has had gait/balance issues. Endorsed that if she sits for a while and gets up, she feels like she is "drunk" and feels off. Denied chest pain, SOB, fevers, chills, numbness/paresthesias, muscular weakness, dizziness, abdominal pain, headaches.    brain mass  - likely metastatic lung cancer  - await mri  - decadron  - keppra  - seen by NS    lung mass  - pulm called  - bronch is scheduled for today    diabetes  - hold oral hypoglycemics  - insulin ss  - hgb a1c  - elevated sugars are expected  - endo consult    HTN  -  cont home meds    cad  - s/p stent  - hold asa and plavix for poss bx    dvt px  - scd's

## 2022-07-20 NOTE — CONSULT NOTE ADULT - SUBJECTIVE AND OBJECTIVE BOX
HPI:   81 y/o female (former smoker) with PMHx of HTN, CAD, HLD, MI, peripheral neuropathy, DM and peripheral artery disease presented to the ED for imaging for her head due to abnormal spine MRI by her orthopedist (Dr. Castro) during workup for her spine for her chronic back problems. The orthopedist noted something in her brain. Otherwise pt has no symptoms, stated that since 04/2022 she has had gait/balance issues. Endorsed that if she sits for a while and gets up, she feels like she is "drunk" and feels off. Denied chest pain, SOB, fevers, chills, numbness/paresthesias, muscular weakness, dizziness, abdominal pain, headaches. (19 Jul 2022 10:06)    Patient has history of diabetes, A1C 8.7%, was on oral meds and insulin at home (Toujeo 10u PM, 6-10u AM, Glimepiride 4mg daily AM, Metformin), reports fluctuating blood sugars with recent hypoglycemic episodes, no polyuria polydipsia. Patient follows up with Endo Dr Richardson for diabetes management.  Endo was consulted for glycemic control.    PAST MEDICAL & SURGICAL HISTORY:  Hypertension      Coronary artery disease      Hyperlipidemia      Peripheral artery disease      Myocardial infarct      Peripheral neuropathy      Diabetes      PAD (peripheral artery disease)      CAD (coronary artery disease)  stents          FAMILY HISTORY:  Family history of stroke (Sibling)        Social History:    Outpatient Medications:    MEDICATIONS  (STANDING):  atorvastatin 40 milliGRAM(s) Oral at bedtime  dexAMETHasone     Tablet 4 milliGRAM(s) Oral every 6 hours  dextrose 5%. 1000 milliLiter(s) (50 mL/Hr) IV Continuous <Continuous>  dextrose 5%. 1000 milliLiter(s) (100 mL/Hr) IV Continuous <Continuous>  dextrose 50% Injectable 25 Gram(s) IV Push once  dextrose 50% Injectable 12.5 Gram(s) IV Push once  dextrose 50% Injectable 25 Gram(s) IV Push once  gabapentin 200 milliGRAM(s) Oral at bedtime  glucagon  Injectable 1 milliGRAM(s) IntraMuscular once  hydrochlorothiazide 25 milliGRAM(s) Oral daily  insulin glargine Injectable (LANTUS) 26 Unit(s) SubCutaneous at bedtime  insulin lispro (ADMELOG) corrective regimen sliding scale   SubCutaneous three times a day before meals  insulin lispro (ADMELOG) corrective regimen sliding scale   SubCutaneous at bedtime  insulin lispro Injectable (ADMELOG) 8 Unit(s) SubCutaneous three times a day before meals  levETIRAcetam 500 milliGRAM(s) Oral two times a day  losartan 100 milliGRAM(s) Oral daily  magnesium sulfate  IVPB 2 Gram(s) IV Intermittent once  metoprolol tartrate 50 milliGRAM(s) Oral three times a day  sodium chloride 0.9%. 500 milliLiter(s) (30 mL/Hr) IV Continuous <Continuous>    MEDICATIONS  (PRN):  dextrose Oral Gel 15 Gram(s) Oral once PRN Blood Glucose LESS THAN 70 milliGRAM(s)/deciliter      Allergies    No Known Allergies    Intolerances      Review of Systems:  Constitutional: No fever, no chills  Eyes: No blurry vision  Neuro: No tremors  HEENT: No pain, no neck swelling  Cardiovascular: No chest pain, no palpitations  Respiratory: Has SOB, no cough  GI: No nausea, vomiting, abdominal pain  : No dysuria  Skin: no rash  MSK: Has leg swelling.  Psych: no depression  Endocrine: no polyuria, polydipsia    ALL OTHER SYSTEMS REVIEWED AND NEGATIVE    UNABLE TO OBTAIN    PHYSICAL EXAM:  VITALS: T(C): 36.5 (07-20-22 @ 14:44)  T(F): 97.7 (07-20-22 @ 14:44), Max: 98.2 (07-20-22 @ 10:58)  HR: 64 (07-20-22 @ 14:44) (55 - 71)  BP: 127/83 (07-20-22 @ 14:44) (98/73 - 136/70)  RR:  (14 - 18)  SpO2:  (93% - 99%)  Wt(kg): --  GENERAL: NAD, well-groomed, well-developed  EYES: No proptosis, no lid lag  HEENT:  Atraumatic, Normocephalic  THYROID: Normal size, no palpable nodules  RESPIRATORY: Clear to auscultation bilaterally; No rales, rhonchi, wheezing  CARDIOVASCULAR: Si S2, No murmurs;  GI: Soft, non distended, normal bowel sounds  SKIN: Dry, intact, No rashes or lesions  MUSCULOSKELETAL: Has BL lower extremity edema.  NEURO:  no tremor, sensation decreased in feet BL,    POCT Blood Glucose.: 370 mg/dL (07-20-22 @ 09:49)  POCT Blood Glucose.: 394 mg/dL (07-19-22 @ 21:27)  POCT Blood Glucose.: 241 mg/dL (07-19-22 @ 15:51)  POCT Blood Glucose.: 299 mg/dL (07-19-22 @ 12:13)  POCT Blood Glucose.: 365 mg/dL (07-19-22 @ 09:20)                            13.3   11.97 )-----------( 329      ( 20 Jul 2022 06:50 )             41.5       07-20    140  |  104  |  36<H>  ----------------------------<  328<H>  4.7   |  22  |  1.08    eGFR: 51<L>    Ca    9.8      07-20  Mg     1.5     07-20  Phos  3.4     07-20    TPro  7.7  /  Alb  4.8  /  TBili  0.4  /  DBili  x   /  AST  16  /  ALT  9<L>  /  AlkPhos  96  07-18      Thyroid Function Tests:  07-20 @ 06:50 TSH 0.27 FreeT4 -- T3 -- Anti TPO -- Anti Thyroglobulin Ab -- TSI --              Radiology:

## 2022-07-20 NOTE — CONSULT NOTE ADULT - ASSESSMENT
Assessment  DMT2: 82y Female with DM T2 with hyperglycemia, A1C 8.4%, was on oral meds and insulin at home, now on basal bolus, blood sugars running high and not at target in the setting of steroid management, no hypoglycemic episodes, NAD, on dexamethasone.  Brain Mass: on meds, monitored, FU Neurosurgery.  Lung Mass: for bronchoscopy, monitored.  Thyroid Nodules: seen on imaging, B/L subcentimeter thyroid nodules, she denies neck mass/pain/dysphagia, euthyroid, reports known history and is being followed by Dr. Richardson.  Adrenal Nodule: seen on imaging, 1.2 cm left thyroid nodule, unknown history.        Marsha Tovar MD  Cell: [phone]  Office: [phone]               Assessment  DMT2: 82y Female with DM T2 with hyperglycemia, A1C 8.4%, was on oral meds and insulin at home, now on basal bolus, blood sugars running high and not at target in the setting of steroid management, no hypoglycemic episodes, NAD, on dexamethasone.  Brain Mass: on meds, monitored, FU Neurosurgery.  Lung Mass: for bronchoscopy, monitored.  Thyroid Nodules: seen on imaging, B/L subcentimeter thyroid nodules, she denies neck mass/pain/dysphagia, euthyroid, reports known history and is being followed by Dr. Richardson.  Adrenal Nodule: seen on imaging, 1.2 cm left thyroid nodule, unknown history.        Troy Thibodeaux MD  [phone]

## 2022-07-20 NOTE — CONSULT NOTE ADULT - PROBLEM SELECTOR RECOMMENDATION 9
Will increase Lantus to 26u at bedtime.  Will increase Admelog to 8u before each meal and continue Admelog correction scale coverage. Will continue monitoring FS and FU.  Patient counseled for compliance with consistent low carb diet and exercise as tolerated outpatient.

## 2022-07-20 NOTE — PROGRESS NOTE ADULT - SUBJECTIVE AND OBJECTIVE BOX
Patient seen and examined at bedside.    --Anticoagulation--    T(C): 36.4 (07-20-22 @ 04:31), Max: 36.6 (07-19-22 @ 21:30)  HR: 55 (07-20-22 @ 04:31) (55 - 82)  BP: 136/70 (07-20-22 @ 04:31) (118/76 - 136/70)  RR: 18 (07-20-22 @ 04:31) (18 - 18)  SpO2: 99% (07-20-22 @ 04:31) (95% - 99%)  Wt(kg): --    Exam: AOx3, PERRL, EOMI, no drift, no facial droop, DE LA GARZA 5/5, SILT

## 2022-07-20 NOTE — PROGRESS NOTE ADULT - ASSESSMENT
83 y/o woman with lung mass Bronchoscopy planned  Await pathology.    Brain mass- Right parietal and left as well.   Mri planned  Case d/w NSGY.    Oncology planning pending above.

## 2022-07-20 NOTE — PROGRESS NOTE ADULT - SUBJECTIVE AND OBJECTIVE BOX
CARDIOLOGY FOLLOW UP - Dr. Alcaraz  DATE OF SERVICE: 7/20/22      no acute cv events       REVIEW OF SYSTEMS:  CONSTITUTIONAL: No fever, weight loss, or fatigue  RESPIRATORY: No cough, wheezing, chills or hemoptysis; No Shortness of Breath  CARDIOVASCULAR: No chest pain, palpitations, passing out, dizziness, or leg swelling  GASTROINTESTINAL: No abdominal or epigastric pain. No nausea, vomiting, or hematemesis; No diarrhea or constipation. No melena or hematochezia.  VASCULAR: No edema     PHYSICAL EXAM:  T(C): 36.5 (07-20-22 @ 14:44), Max: 36.8 (07-20-22 @ 10:58)  HR: 64 (07-20-22 @ 14:44) (55 - 71)  BP: 127/83 (07-20-22 @ 14:44) (98/73 - 136/70)  RR: 16 (07-20-22 @ 14:44) (14 - 18)  SpO2: 96% (07-20-22 @ 14:44) (93% - 99%)  Wt(kg): --  I&O's Summary      Appearance: Normal	  Cardiovascular: Normal S1 S2,RRR, No JVD, No murmurs  Respiratory: Lungs clear to auscultation	  Gastrointestinal:  Soft, Non-tender, + BS	  Extremities: Normal range of motion, No clubbing, cyanosis or edema      Home Medications:  aspirin 81 mg oral tablet: 1 tab(s) orally once a day (19 Jul 2022 14:45)  gabapentin 100 mg oral capsule: 2 cap(s) orally once a day (at bedtime) (19 Jul 2022 14:45)  glimepiride 4 mg oral tablet: 1 tab(s) orally 2 times a day (19 Jul 2022 14:45)  losartan-hydrochlorothiazide 100 mg-25 mg oral tablet: 1 tab(s) orally once a day (19 Jul 2022 14:45)  metFORMIN 1000 mg oral tablet: 1 tab(s) orally 2 times a day   (19 Jul 2022 14:45)  metoprolol tartrate 50 mg oral tablet: 1 tab(s) orally 3 times a day (19 Jul 2022 14:45)  simvastatin 40 mg oral tablet: 1 tab(s) orally once a day (at bedtime) (19 Jul 2022 14:45)  Toujeo Max SoloStar 300 units/mL subcutaneous solution: 6 unit(s) subcutaneous 2 times a day as directed (19 Jul 2022 14:45)  Victoza 18 mg/3 mL subcutaneous solution: 0.6 milligram(s) subcutaneous once a day (19 Jul 2022 14:45)      MEDICATIONS  (STANDING):  atorvastatin 40 milliGRAM(s) Oral at bedtime  dexAMETHasone     Tablet 4 milliGRAM(s) Oral every 6 hours  dextrose 5%. 1000 milliLiter(s) (50 mL/Hr) IV Continuous <Continuous>  dextrose 5%. 1000 milliLiter(s) (100 mL/Hr) IV Continuous <Continuous>  dextrose 50% Injectable 25 Gram(s) IV Push once  dextrose 50% Injectable 12.5 Gram(s) IV Push once  dextrose 50% Injectable 25 Gram(s) IV Push once  gabapentin 200 milliGRAM(s) Oral at bedtime  glucagon  Injectable 1 milliGRAM(s) IntraMuscular once  hydrochlorothiazide 25 milliGRAM(s) Oral daily  insulin glargine Injectable (LANTUS) 26 Unit(s) SubCutaneous at bedtime  insulin lispro (ADMELOG) corrective regimen sliding scale   SubCutaneous three times a day before meals  insulin lispro (ADMELOG) corrective regimen sliding scale   SubCutaneous at bedtime  insulin lispro Injectable (ADMELOG) 8 Unit(s) SubCutaneous three times a day before meals  levETIRAcetam 500 milliGRAM(s) Oral two times a day  losartan 100 milliGRAM(s) Oral daily  magnesium sulfate  IVPB 2 Gram(s) IV Intermittent once  metoprolol tartrate 50 milliGRAM(s) Oral three times a day  sodium chloride 0.9%. 500 milliLiter(s) (30 mL/Hr) IV Continuous <Continuous>      TELEMETRY: 	    ECG:  	  RADIOLOGY:   DIAGNOSTIC TESTING:  [ ] Echocardiogram:  [ ]  Catheterization:  [ ] Stress Test:    OTHER: 	    LABS:	 	                            13.3   11.97 )-----------( 329      ( 20 Jul 2022 06:50 )             41.5     07-20    140  |  104  |  36<H>  ----------------------------<  328<H>  4.7   |  22  |  1.08    Ca    9.8      20 Jul 2022 06:50  Phos  3.4     07-20  Mg     1.5     07-20    TPro  7.7  /  Alb  4.8  /  TBili  0.4  /  DBili  x   /  AST  16  /  ALT  9<L>  /  AlkPhos  96  07-18    PT/INR - ( 18 Jul 2022 21:10 )   PT: 11.8 sec;   INR: 1.03 ratio         PTT - ( 18 Jul 2022 21:10 )  PTT:29.2 sec

## 2022-07-20 NOTE — PRE PROCEDURE NOTE - PRE PROCEDURE EVALUATION
Attending Physician:    Dr. Hopkins                        Procedure:   Bronchoscopy    Indication for Procedure:   Concern for Primary Malignancy  ________________________________________________________  PAST MEDICAL & SURGICAL HISTORY:    Hypertension  Hyperlipidemia  Peripheral artery disease  Myocardial infarct  Peripheral neuropathy  Diabetes  PAD (peripheral artery disease)  CAD (coronary artery disease)-stents    ALLERGIES:  No Known Allergies    HOME MEDICATIONS:  aspirin 81 mg oral tablet: 1 tab(s) orally once a day  gabapentin 100 mg oral capsule: 2 cap(s) orally once a day (at bedtime)  glimepiride 4 mg oral tablet: 1 tab(s) orally 2 times a day  losartan-hydrochlorothiazide 100 mg-25 mg oral tablet: 1 tab(s) orally once a day  metFORMIN 1000 mg oral tablet: 1 tab(s) orally 2 times a day  metoprolol tartrate 50 mg oral tablet: 1 tab(s) orally 3 times a day  simvastatin 40 mg oral tablet: 1 tab(s) orally once a day (at bedtime)  Toujeo Max SoloStar 300 units/mL subcutaneous solution: 6 unit(s) subcutaneous 2 times a day as directed  Victoza 18 mg/3 mL subcutaneous solution: 0.6 milligram(s) subcutaneous once a day    AICD/PPM: [ ] yes   [X ] no    PERTINENT LAB DATA:                        13.3   11.97 )-----------( 329      ( 20 Jul 2022 06:50 )             41.5     07-20    140  |  104  |  36<H>  ----------------------------<  328<H>  4.7   |  22  |  1.08    Ca    9.8      20 Jul 2022 06:50  Phos  3.4     07-20  Mg     1.5     07-20  TPro  7.7  /  Alb  4.8  /  TBili  0.4  /  DBili  x   /  AST  16  /  ALT  9<L>  /  AlkPhos  96  07-18  PT/INR - ( 18 Jul 2022 21:10 )   PT: 11.8 sec;   INR: 1.03 ratio    PTT - ( 18 Jul 2022 21:10 )  PTT:29.2 sec    PHYSICAL EXAMINATION:    T(C): 36.8  HR: 58  BP: 108/64  RR: 18  SpO2: 99%    Constitutional: NAD    Neck:  No JVD  Respiratory: CTAB/L  Cardiovascular: S1 and S2  Gastrointestinal: BS+, soft, NT/ND  Extremities: No peripheral edema  Neurological: A/O x 4      COMMENTS:    The patient is a suitable candidate for the planned procedure unless box checked [ ]  No, explain:

## 2022-07-20 NOTE — PROGRESS NOTE ADULT - ASSESSMENT
82F mult med hx (no cancer hx) inc "stent in R leg" 2-3y ago on ASA/plavix, was called by her orthopedic surgeon, Dr. Alin Castro, telling her to come to ED 4d ago for abnormal MRI C-spine (7/14) c/f intracranial pathology. Pt waited until today to come in b/c feels fine. Reports balance/gait issues since April. CTH showing 4.8x3.2x3.2cm R temp mass and 1.4x1.3x1.6cm L frontal mass w/ vasogenic edema in mult lobes; ~8mm MLS. Also got MR L spine (7/14) form LBP radiating to legs showing multilevel foraminal stenosis/disc bulges; L5-S1 grade 1 spondy. Exam: AOx3, PERRL, EOMI, no facial, no drift, DE LA GARZA 5/5, SILT, no alfredo, no clonus.    -No acute nsgy intervention  -Adm Medicine, q4h neurochecks  -MRI brain w/wo  -Bronchoscopy today to biopsy ANTHONY mass  -Rad Onc, Med Onc consult  -Keppra 500bid  -Hold AC/AP  -Dex 4q6  -PT/OT  -Pain control

## 2022-07-20 NOTE — PROGRESS NOTE ADULT - SUBJECTIVE AND OBJECTIVE BOX
LANIE BERTRAND  MRN-[id number]    Patient is a 82y old  Female who presents with a chief complaint of abnormal imaging (19 Jul 2022 14:13)      Review of System  REVIEW OF SYSTEMS      General:	Denies fatigue, fevers, chills, sweats, decreased appetite.    Skin/Breast: denies pruritis, rash  	  Ophthalmologic: no change in vision or blurring  	  HEENT	Denies dry mouth, oral sores, dysphagia,  change in hearing.    Respiratory and Thorax:  cough, sob, wheeze, hemoptysis  	  Cardiovascular:	no cp , palp, orthopnea    Gastrointestinal:	no n/v/d constipation    Genitourinary:	no dysuria of frequency, no hematuria, no flank pain    Musculoskeletal:	no bone or joint pain. no muscle aches.     Neurological:	no change in sensory or motor function. no headache. no weakness.     Psychiatric:	no depression, no anxiety, insomnia.     Hematology/Lymphatics:	no bleeding or bruising        Current Meds  MEDICATIONS  (STANDING):  atorvastatin 40 milliGRAM(s) Oral at bedtime  dexAMETHasone     Tablet 4 milliGRAM(s) Oral every 6 hours  dextrose 5%. 1000 milliLiter(s) (50 mL/Hr) IV Continuous <Continuous>  dextrose 5%. 1000 milliLiter(s) (100 mL/Hr) IV Continuous <Continuous>  dextrose 50% Injectable 25 Gram(s) IV Push once  dextrose 50% Injectable 12.5 Gram(s) IV Push once  dextrose 50% Injectable 25 Gram(s) IV Push once  gabapentin 200 milliGRAM(s) Oral at bedtime  glucagon  Injectable 1 milliGRAM(s) IntraMuscular once  hydrochlorothiazide 25 milliGRAM(s) Oral daily  insulin glargine Injectable (LANTUS) 15 Unit(s) SubCutaneous at bedtime  insulin lispro (ADMELOG) corrective regimen sliding scale   SubCutaneous three times a day before meals  insulin lispro (ADMELOG) corrective regimen sliding scale   SubCutaneous at bedtime  insulin lispro Injectable (ADMELOG) 2 Unit(s) SubCutaneous three times a day before meals  levETIRAcetam 500 milliGRAM(s) Oral two times a day  losartan 100 milliGRAM(s) Oral daily  metoprolol tartrate 50 milliGRAM(s) Oral three times a day    MEDICATIONS  (PRN):  dextrose Oral Gel 15 Gram(s) Oral once PRN Blood Glucose LESS THAN 70 milliGRAM(s)/deciliter      Vitals  Vital Signs Last 24 Hrs  T(C): 36.4 (20 Jul 2022 04:31), Max: 37.1 (19 Jul 2022 06:29)  T(F): 97.6 (20 Jul 2022 04:31), Max: 98.7 (19 Jul 2022 06:29)  HR: 55 (20 Jul 2022 04:31) (55 - 85)  BP: 136/70 (20 Jul 2022 04:31) (118/76 - 164/83)  BP(mean): --  RR: 18 (20 Jul 2022 04:31) (18 - 18)  SpO2: 99% (20 Jul 2022 04:31) (95% - 99%)    Parameters below as of 20 Jul 2022 04:31  Patient On (Oxygen Delivery Method): room air        Physical Exam  PHYSICAL EXAM:      Constitutional: NAD    Eyes: PERRLA EOMI, anicteric sclera    Heent :No oral sores, no pharyngeal injection. moist mucosa.    Neck: supple, no jvd, no LAD    Respiratory: CTA b/l     Cardiovascular: s1s2, no m/g/r    Gastrointestinal: soft, nt, nd, + BS    Extremities: no c/c/e    Neurological:A&O x 3 moves all ext.    Skin: no rash on exposed skin    Lymph Nodes: no lymphadenopathy.              Lab  CBC Full  -  ( 18 Jul 2022 21:10 )  WBC Count : 9.73 K/uL  RBC Count : 4.49 M/uL  Hemoglobin : 13.8 g/dL  Hematocrit : 43.2 %  Platelet Count - Automated : 387 K/uL  Mean Cell Volume : 96.2 fl  Mean Cell Hemoglobin : 30.7 pg  Mean Cell Hemoglobin Concentration : 31.9 gm/dL  Auto Neutrophil # : 5.63 K/uL  Auto Lymphocyte # : 3.01 K/uL  Auto Monocyte # : 0.53 K/uL  Auto Eosinophil # : 0.48 K/uL  Auto Basophil # : 0.04 K/uL  Auto Neutrophil % : 58.0 %  Auto Lymphocyte % : 30.9 %  Auto Monocyte % : 5.4 %  Auto Eosinophil % : 4.9 %  Auto Basophil % : 0.4 %    07-18    142  |  103  |  29<H>  ----------------------------<  180<H>  3.8   |  25  |  0.90    Ca    10.3      18 Jul 2022 21:10    TPro  7.7  /  Alb  4.8  /  TBili  0.4  /  DBili  x   /  AST  16  /  ALT  9<L>  /  AlkPhos  96  07-18    PT/INR - ( 18 Jul 2022 21:10 )   PT: 11.8 sec;   INR: 1.03 ratio         PTT - ( 18 Jul 2022 21:10 )  PTT:29.2 sec    Rad:    Assessment/Plan   LANIE BERTRAND  MRN-[id number]    Patient is a 82y old  Female who presents with a chief complaint of abnormal imaging (19 Jul 2022 14:13)      Review of System    No new events overnight.    General:	Denies fatigue, fevers, chills, sweats, decreased appetite.    Skin/Breast: denies pruritis, rash  	  Ophthalmologic: no change in vision or blurring  	  HEENT	Denies dry mouth, oral sores, dysphagia,  change in hearing.    Respiratory and Thorax:  no cough, sob, wheeze, hemoptysis  	  Cardiovascular:	no cp , palp, orthopnea    Gastrointestinal:	no n/v/d constipation    Genitourinary:	no dysuria of frequency, no hematuria, no flank pain    Musculoskeletal:	no bone or joint pain. no muscle aches.     Neurological:	no change in sensory or motor function. no headache. no weakness.     Psychiatric:	no depression, no anxiety, insomnia.     Hematology/Lymphatics:	no bleeding or bruising    Current Meds  MEDICATIONS  (STANDING):  atorvastatin 40 milliGRAM(s) Oral at bedtime  dexAMETHasone     Tablet 4 milliGRAM(s) Oral every 6 hours  dextrose 5%. 1000 milliLiter(s) (50 mL/Hr) IV Continuous <Continuous>  dextrose 5%. 1000 milliLiter(s) (100 mL/Hr) IV Continuous <Continuous>  dextrose 50% Injectable 25 Gram(s) IV Push once  dextrose 50% Injectable 12.5 Gram(s) IV Push once  dextrose 50% Injectable 25 Gram(s) IV Push once  gabapentin 200 milliGRAM(s) Oral at bedtime  glucagon  Injectable 1 milliGRAM(s) IntraMuscular once  hydrochlorothiazide 25 milliGRAM(s) Oral daily  insulin glargine Injectable (LANTUS) 15 Unit(s) SubCutaneous at bedtime  insulin lispro (ADMELOG) corrective regimen sliding scale   SubCutaneous three times a day before meals  insulin lispro (ADMELOG) corrective regimen sliding scale   SubCutaneous at bedtime  insulin lispro Injectable (ADMELOG) 2 Unit(s) SubCutaneous three times a day before meals  levETIRAcetam 500 milliGRAM(s) Oral two times a day  losartan 100 milliGRAM(s) Oral daily  metoprolol tartrate 50 milliGRAM(s) Oral three times a day    MEDICATIONS  (PRN):  dextrose Oral Gel 15 Gram(s) Oral once PRN Blood Glucose LESS THAN 70 milliGRAM(s)/deciliter    Vital Signs Last 24 Hrs  T(C): 36.4 (20 Jul 2022 04:31), Max: 37.1 (19 Jul 2022 06:29)  T(F): 97.6 (20 Jul 2022 04:31), Max: 98.7 (19 Jul 2022 06:29)  HR: 55 (20 Jul 2022 04:31) (55 - 85)  BP: 136/70 (20 Jul 2022 04:31) (118/76 - 164/83)  BP(mean): --  RR: 18 (20 Jul 2022 04:31) (18 - 18)  SpO2: 99% (20 Jul 2022 04:31) (95% - 99%)    Parameters below as of 20 Jul 2022 04:31  Patient On (Oxygen Delivery Method): room air      PHYSICAL EXAM:    Constitutional: NAD    Eyes: PERRLA EOMI, anicteric sclera    Heent :No oral sores, no pharyngeal injection. moist mucosa.    Neck: supple, no jvd, no LAD    Respiratory: CTA b/l     Cardiovascular: s1s2, no m/g/r    Gastrointestinal: soft, nt, nd, + BS    Extremities: no c/c/e    Neurological:A&O x 3 moves all ext.    Skin: no rash on exposed skin    Lymph Nodes: no lymphadenopathy.              Lab  CBC Full  -  ( 18 Jul 2022 21:10 )  WBC Count : 9.73 K/uL  RBC Count : 4.49 M/uL  Hemoglobin : 13.8 g/dL  Hematocrit : 43.2 %  Platelet Count - Automated : 387 K/uL  Mean Cell Volume : 96.2 fl  Mean Cell Hemoglobin : 30.7 pg  Mean Cell Hemoglobin Concentration : 31.9 gm/dL  Auto Neutrophil # : 5.63 K/uL  Auto Lymphocyte # : 3.01 K/uL  Auto Monocyte # : 0.53 K/uL  Auto Eosinophil # : 0.48 K/uL  Auto Basophil # : 0.04 K/uL  Auto Neutrophil % : 58.0 %  Auto Lymphocyte % : 30.9 %  Auto Monocyte % : 5.4 %  Auto Eosinophil % : 4.9 %  Auto Basophil % : 0.4 %    07-18    142  |  103  |  29<H>  ----------------------------<  180<H>  3.8   |  25  |  0.90    Ca    10.3      18 Jul 2022 21:10    TPro  7.7  /  Alb  4.8  /  TBili  0.4  /  DBili  x   /  AST  16  /  ALT  9<L>  /  AlkPhos  96  07-18    PT/INR - ( 18 Jul 2022 21:10 )   PT: 11.8 sec;   INR: 1.03 ratio         PTT - ( 18 Jul 2022 21:10 )  PTT:29.2 sec    Rad:    Assessment/Plan

## 2022-07-21 LAB
ANION GAP SERPL CALC-SCNC: 15 MMOL/L — SIGNIFICANT CHANGE UP (ref 5–17)
BUN SERPL-MCNC: 38 MG/DL — HIGH (ref 7–23)
CALCIUM SERPL-MCNC: 9.8 MG/DL — SIGNIFICANT CHANGE UP (ref 8.4–10.5)
CHLORIDE SERPL-SCNC: 100 MMOL/L — SIGNIFICANT CHANGE UP (ref 96–108)
CO2 SERPL-SCNC: 23 MMOL/L — SIGNIFICANT CHANGE UP (ref 22–31)
CORTIS AM PEAK SERPL-MCNC: 1.8 UG/DL — LOW (ref 6–18.4)
CREAT SERPL-MCNC: 0.98 MG/DL — SIGNIFICANT CHANGE UP (ref 0.5–1.3)
EGFR: 58 ML/MIN/1.73M2 — LOW
GLUCOSE BLDC GLUCOMTR-MCNC: 205 MG/DL — HIGH (ref 70–99)
GLUCOSE BLDC GLUCOMTR-MCNC: 219 MG/DL — HIGH (ref 70–99)
GLUCOSE BLDC GLUCOMTR-MCNC: 251 MG/DL — HIGH (ref 70–99)
GLUCOSE BLDC GLUCOMTR-MCNC: 315 MG/DL — HIGH (ref 70–99)
GLUCOSE SERPL-MCNC: 239 MG/DL — HIGH (ref 70–99)
HCT VFR BLD CALC: 41.8 % — SIGNIFICANT CHANGE UP (ref 34.5–45)
HGB BLD-MCNC: 13.6 G/DL — SIGNIFICANT CHANGE UP (ref 11.5–15.5)
MAGNESIUM SERPL-MCNC: 2.2 MG/DL — SIGNIFICANT CHANGE UP (ref 1.6–2.6)
MCHC RBC-ENTMCNC: 30.4 PG — SIGNIFICANT CHANGE UP (ref 27–34)
MCHC RBC-ENTMCNC: 32.5 GM/DL — SIGNIFICANT CHANGE UP (ref 32–36)
MCV RBC AUTO: 93.3 FL — SIGNIFICANT CHANGE UP (ref 80–100)
NIGHT BLUE STAIN TISS: SIGNIFICANT CHANGE UP
NRBC # BLD: 0 /100 WBCS — SIGNIFICANT CHANGE UP (ref 0–0)
PHOSPHATE SERPL-MCNC: 3.3 MG/DL — SIGNIFICANT CHANGE UP (ref 2.5–4.5)
PLATELET # BLD AUTO: 417 K/UL — HIGH (ref 150–400)
POTASSIUM SERPL-MCNC: 4.1 MMOL/L — SIGNIFICANT CHANGE UP (ref 3.5–5.3)
POTASSIUM SERPL-SCNC: 4.1 MMOL/L — SIGNIFICANT CHANGE UP (ref 3.5–5.3)
RBC # BLD: 4.48 M/UL — SIGNIFICANT CHANGE UP (ref 3.8–5.2)
RBC # FLD: 12.5 % — SIGNIFICANT CHANGE UP (ref 10.3–14.5)
SODIUM SERPL-SCNC: 138 MMOL/L — SIGNIFICANT CHANGE UP (ref 135–145)
SPECIMEN SOURCE: SIGNIFICANT CHANGE UP
WBC # BLD: 15.12 K/UL — HIGH (ref 3.8–10.5)
WBC # FLD AUTO: 15.12 K/UL — HIGH (ref 3.8–10.5)

## 2022-07-21 PROCEDURE — 99222 1ST HOSP IP/OBS MODERATE 55: CPT

## 2022-07-21 PROCEDURE — 93010 ELECTROCARDIOGRAM REPORT: CPT

## 2022-07-21 RX ORDER — INSULIN LISPRO 100/ML
10 VIAL (ML) SUBCUTANEOUS
Refills: 0 | Status: DISCONTINUED | OUTPATIENT
Start: 2022-07-21 | End: 2022-07-22

## 2022-07-21 RX ORDER — LANOLIN ALCOHOL/MO/W.PET/CERES
3 CREAM (GRAM) TOPICAL ONCE
Refills: 0 | Status: COMPLETED | OUTPATIENT
Start: 2022-07-21 | End: 2022-07-21

## 2022-07-21 RX ORDER — METOPROLOL TARTRATE 50 MG
50 TABLET ORAL THREE TIMES A DAY
Refills: 0 | Status: DISCONTINUED | OUTPATIENT
Start: 2022-07-21 | End: 2022-07-26

## 2022-07-21 RX ORDER — LANOLIN ALCOHOL/MO/W.PET/CERES
3 CREAM (GRAM) TOPICAL AT BEDTIME
Refills: 0 | Status: DISCONTINUED | OUTPATIENT
Start: 2022-07-21 | End: 2022-07-27

## 2022-07-21 RX ORDER — INSULIN GLARGINE 100 [IU]/ML
32 INJECTION, SOLUTION SUBCUTANEOUS AT BEDTIME
Refills: 0 | Status: DISCONTINUED | OUTPATIENT
Start: 2022-07-21 | End: 2022-07-22

## 2022-07-21 RX ADMIN — Medication 6: at 17:25

## 2022-07-21 RX ADMIN — LEVETIRACETAM 500 MILLIGRAM(S): 250 TABLET, FILM COATED ORAL at 17:51

## 2022-07-21 RX ADMIN — GABAPENTIN 200 MILLIGRAM(S): 400 CAPSULE ORAL at 21:52

## 2022-07-21 RX ADMIN — Medication 3 MILLIGRAM(S): at 00:15

## 2022-07-21 RX ADMIN — LOSARTAN POTASSIUM 100 MILLIGRAM(S): 100 TABLET, FILM COATED ORAL at 05:33

## 2022-07-21 RX ADMIN — Medication 3 MILLIGRAM(S): at 23:09

## 2022-07-21 RX ADMIN — ATORVASTATIN CALCIUM 40 MILLIGRAM(S): 80 TABLET, FILM COATED ORAL at 21:53

## 2022-07-21 RX ADMIN — Medication 25 MILLIGRAM(S): at 05:33

## 2022-07-21 RX ADMIN — INSULIN GLARGINE 32 UNIT(S): 100 INJECTION, SOLUTION SUBCUTANEOUS at 21:53

## 2022-07-21 RX ADMIN — Medication 10 UNIT(S): at 17:26

## 2022-07-21 RX ADMIN — Medication 4 MILLIGRAM(S): at 05:33

## 2022-07-21 RX ADMIN — Medication 4: at 12:20

## 2022-07-21 RX ADMIN — Medication 4 MILLIGRAM(S): at 23:09

## 2022-07-21 RX ADMIN — Medication 4: at 21:53

## 2022-07-21 RX ADMIN — CHLORHEXIDINE GLUCONATE 1 APPLICATION(S): 213 SOLUTION TOPICAL at 12:50

## 2022-07-21 RX ADMIN — Medication 4: at 08:53

## 2022-07-21 RX ADMIN — Medication 4 MILLIGRAM(S): at 12:51

## 2022-07-21 RX ADMIN — LEVETIRACETAM 500 MILLIGRAM(S): 250 TABLET, FILM COATED ORAL at 05:33

## 2022-07-21 RX ADMIN — Medication 4 MILLIGRAM(S): at 17:50

## 2022-07-21 RX ADMIN — Medication 4 MILLIGRAM(S): at 00:17

## 2022-07-21 NOTE — CONSULT NOTE ADULT - SUBJECTIVE AND OBJECTIVE BOX
HPI:   81 y/o female (former smoker) with PMHx of HTN, CAD, HLD, MI, peripheral neuropathy, DM and peripheral artery disease presented to the ED for imaging for her head due to abnormal spine MRI by her orthopedist (Dr. Castro) during workup for her spine for her chronic back problems. The orthopedist noted something in her brain. Otherwise pt has no symptoms, stated that since 04/2022 she has had gait/balance issues. Endorsed that if she sits for a while and gets up, she feels like she is "drunk" and feels off. Denied chest pain, SOB, fevers, chills, numbness/paresthesias, muscular weakness, dizziness, abdominal pain, headaches. (19 Jul 2022 10:06)    She is s/p brachytherapy for the right coronary artery 2021 with Dr. Chavez.  Unsteady gait has been a new complaint recently.   She is pending a brain MRI.    Seen by neurosurgery and will determine if brain metastases are amenable to resection.  Heme onc discussed likely outpatient consideration of treatment after lung biopsy and pathology results are in which may take days; no reason  to remain inpatient as she is medically stable.   Rad Onc consulted to be on board.     Inpatient work up showed: ANTHONY mass 3.3 cm and brain metastases, r/o adrenal nodule    < from: CT Chest w/ IV Cont (07.18.22 @ 22:52) >  IMPRESSION:  Left upper lobe lung mass with partial atelectasis of the left upper   lobe, concerning for primary lung neoplasm. Few scattered sub-4 mm   pulmonary nodules, indeterminate.    Indeterminant left adrenal nodule for which contrast enhanced MRI is   recommended for further evaluation.      < from: CT Head No Cont (07.18.22 @ 21:39) >  IMPRESSION:  Cystic versus a necrotic masses measure approximately 4.8 x 3.2 x 3.2 cm   in the right temporal lobe and 1.4 x 1.3 x 1.6 cm in the medial left   frontal lobe.    Moderate to large amount of vasogenic edema in the right frontal,   parietal and temporal lobes.  Trace edema in the left frontal lobe.    Regional mass effect in the right cerebral hemisphere with partial   effacement of theventricular system and 5 mm subfalcine herniation of   the right frontal lobe underneath the cerebral falx.    The findings may represent intracranial metastatic disease versus   glioblastoma.  Contrast-enhanced brain MRI is recommended for further   evaluation.      Findings discussed with DAVID Albarran by Dr. Harrington at 2153 hours on   7/8/2022 with readback confirmation.    < end of copied text >        Allergies    No Known Allergies    Intolerances        ROS: [  ] Fever  [  ] Chills  [  ]Chest Pain [  ] SOB  [  ]Cough [  ] N/V  [  ] Diarrhea [  ]Constipation [  ]Other ROS:  [  ] ROS otherwise negative    PAST MEDICAL & SURGICAL HISTORY:  Hypertension    Coronary artery disease    Hyperlipidemia    Peripheral artery disease    Myocardial infarct    Peripheral neuropathy    Diabetes    PAD (peripheral artery disease)    CAD (coronary artery disease)  stents        FAMILY HISTORY:  Family history of stroke (Sibling)        MEDICATIONS  (STANDING):  atorvastatin 40 milliGRAM(s) Oral at bedtime  chlorhexidine 2% Cloths 1 Application(s) Topical daily  dexAMETHasone     Tablet 4 milliGRAM(s) Oral every 6 hours  dextrose 5%. 1000 milliLiter(s) (50 mL/Hr) IV Continuous <Continuous>  dextrose 5%. 1000 milliLiter(s) (100 mL/Hr) IV Continuous <Continuous>  dextrose 50% Injectable 25 Gram(s) IV Push once  dextrose 50% Injectable 12.5 Gram(s) IV Push once  dextrose 50% Injectable 25 Gram(s) IV Push once  gabapentin 200 milliGRAM(s) Oral at bedtime  glucagon  Injectable 1 milliGRAM(s) IntraMuscular once  hydrochlorothiazide 25 milliGRAM(s) Oral daily  insulin glargine Injectable (LANTUS) 32 Unit(s) SubCutaneous at bedtime  insulin lispro (ADMELOG) corrective regimen sliding scale   SubCutaneous three times a day before meals  insulin lispro (ADMELOG) corrective regimen sliding scale   SubCutaneous at bedtime  insulin lispro Injectable (ADMELOG) 10 Unit(s) SubCutaneous three times a day before meals  levETIRAcetam 500 milliGRAM(s) Oral two times a day  losartan 100 milliGRAM(s) Oral daily  metoprolol tartrate 50 milliGRAM(s) Oral three times a day    MEDICATIONS  (PRN):  dextrose Oral Gel 15 Gram(s) Oral once PRN Blood Glucose LESS THAN 70 milliGRAM(s)/deciliter      PHYSICAL EXAM  Vital Signs Last 24 Hrs  T(C): 36.8 (21 Jul 2022 13:04), Max: 36.8 (21 Jul 2022 01:33)  T(F): 98.2 (21 Jul 2022 13:04), Max: 98.3 (21 Jul 2022 01:33)  HR: 68 (21 Jul 2022 13:04) (53 - 68)  BP: 125/83 (21 Jul 2022 13:04) (101/50 - 129/60)  BP(mean): --  RR: 18 (21 Jul 2022 13:04) (16 - 18)  SpO2: 97% (21 Jul 2022 13:04) (94% - 97%)    Parameters below as of 21 Jul 2022 13:04  Patient On (Oxygen Delivery Method): room air        General: Well nourished, well developed, no acute distress  HEENT: NC/AT; EOMI, PERRL, sclera nonicteric; external ears normal; no rhinorrhea or epistaxis; mucous membranes moist; oropharynx clear and without erythema  CV: NR, RR; no appreciable r/m/g  Lungs: CTAB, no increased work of breathing  Abdomen: Bowel sounds present; soft, NTND  MSK: Vertebral spine non-tender to palpation  Neuro: AAOx3; cranial nerves II-XII intact; strength 5/5 in upper and lower extremities; sensation to light touch in tact bilaterally.  Psych: Full affect; mood congruent  Skin: no visible rashes on limited examination    IMAGING/LABS/PATHOLOGY: I have personally reviewed the relevant labs, pathology, and imaging as noted in the HPI.  In addition,                          13.6   15.12 )-----------( [id number]      ( 21 Jul 2022 11:13 )             41.8     07-21    138  |  100  |  38<H>  ----------------------------<  239<H>  4.1   |  23  |  0.98    Ca    9.8      21 Jul 2022 11:13  Phos  3.3     07-21  Mg     2.2     07-21          ASSESSMENT/PLAN    LANIE BERTRAND is a 82y woman with  HPI:   83 y/o female (former smoker) with PMHx of HTN, CAD, HLD, MI, peripheral neuropathy, DM and peripheral artery disease presented to the ED for imaging for her head due to abnormal spine MRI by her orthopedist (Dr. Castro) during workup for her spine for her chronic back problems. The orthopedist noted something in her brain. Otherwise pt has no symptoms, stated that since 04/2022 she has had gait/balance issues. Endorsed that if she sits for a while and gets up, she feels like she is "drunk" and feels off. Denied chest pain, SOB, fevers, chills, numbness/paresthesias, muscular weakness, dizziness, abdominal pain, headaches. (19 Jul 2022 10:06)    Son at bedside, Dick: [phone].   He says there is a change in her personality.  She is more talkative and repeats herself.   Dick spoke to Dr. Loomis and his impression is she is planned for surgical resection for the larger mass at the left temporal lobe.     She is s/p brachytherapy for the right coronary artery 2021 with Dr. Chavez.  Unsteady gait has been a new complaint recently.   She is pending a brain MRI.    Seen by neurosurgery and will determine if brain metastases are amenable to resection.  Heme onc discussed likely outpatient consideration of treatment after lung biopsy and pathology results are in which may take days; no reason  to remain inpatient as she is medically stable.   Rad Onc consulted to be on board.     Inpatient work up showed: ANTHONY mass 3.3 cm and brain metastases, r/o adrenal nodule    < from: CT Chest w/ IV Cont (07.18.22 @ 22:52) >  IMPRESSION:  Left upper lobe lung mass with partial atelectasis of the left upper   lobe, concerning for primary lung neoplasm. Few scattered sub-4 mm   pulmonary nodules, indeterminate.    Indeterminant left adrenal nodule for which contrast enhanced MRI is   recommended for further evaluation.      < from: CT Head No Cont (07.18.22 @ 21:39) >  IMPRESSION:  Cystic versus a necrotic masses measure approximately 4.8 x 3.2 x 3.2 cm   in the right temporal lobe and 1.4 x 1.3 x 1.6 cm in the medial left   frontal lobe.    Moderate to large amount of vasogenic edema in the right frontal,   parietal and temporal lobes.  Trace edema in the left frontal lobe.    Regional mass effect in the right cerebral hemisphere with partial   effacement of theventricular system and 5 mm subfalcine herniation of   the right frontal lobe underneath the cerebral falx.    The findings may represent intracranial metastatic disease versus   glioblastoma.  Contrast-enhanced brain MRI is recommended for further   evaluation.      Findings discussed with DAVID Albarran by Dr. Harrington at 2153 hours on   7/8/2022 with readback confirmation.    < end of copied text >        Allergies    No Known Allergies    Intolerances        ROS: [  ] Fever  [  ] Chills  [  ]Chest Pain [  ] SOB  [  ]Cough [  ] N/V  [  ] Diarrhea [  ]Constipation [  ]Other ROS:  [  ] ROS otherwise negative    PAST MEDICAL & SURGICAL HISTORY:  Hypertension    Coronary artery disease    Hyperlipidemia    Peripheral artery disease    Myocardial infarct    Peripheral neuropathy    Diabetes    PAD (peripheral artery disease)    CAD (coronary artery disease)  stents        FAMILY HISTORY:  Family history of stroke (Sibling)        MEDICATIONS  (STANDING):  atorvastatin 40 milliGRAM(s) Oral at bedtime  chlorhexidine 2% Cloths 1 Application(s) Topical daily  dexAMETHasone     Tablet 4 milliGRAM(s) Oral every 6 hours  dextrose 5%. 1000 milliLiter(s) (50 mL/Hr) IV Continuous <Continuous>  dextrose 5%. 1000 milliLiter(s) (100 mL/Hr) IV Continuous <Continuous>  dextrose 50% Injectable 25 Gram(s) IV Push once  dextrose 50% Injectable 12.5 Gram(s) IV Push once  dextrose 50% Injectable 25 Gram(s) IV Push once  gabapentin 200 milliGRAM(s) Oral at bedtime  glucagon  Injectable 1 milliGRAM(s) IntraMuscular once  hydrochlorothiazide 25 milliGRAM(s) Oral daily  insulin glargine Injectable (LANTUS) 32 Unit(s) SubCutaneous at bedtime  insulin lispro (ADMELOG) corrective regimen sliding scale   SubCutaneous three times a day before meals  insulin lispro (ADMELOG) corrective regimen sliding scale   SubCutaneous at bedtime  insulin lispro Injectable (ADMELOG) 10 Unit(s) SubCutaneous three times a day before meals  levETIRAcetam 500 milliGRAM(s) Oral two times a day  losartan 100 milliGRAM(s) Oral daily  metoprolol tartrate 50 milliGRAM(s) Oral three times a day    MEDICATIONS  (PRN):  dextrose Oral Gel 15 Gram(s) Oral once PRN Blood Glucose LESS THAN 70 milliGRAM(s)/deciliter      PHYSICAL EXAM  Vital Signs Last 24 Hrs  T(C): 36.8 (21 Jul 2022 13:04), Max: 36.8 (21 Jul 2022 01:33)  T(F): 98.2 (21 Jul 2022 13:04), Max: 98.3 (21 Jul 2022 01:33)  HR: 68 (21 Jul 2022 13:04) (53 - 68)  BP: 125/83 (21 Jul 2022 13:04) (101/50 - 129/60)  BP(mean): --  RR: 18 (21 Jul 2022 13:04) (16 - 18)  SpO2: 97% (21 Jul 2022 13:04) (94% - 97%)    Parameters below as of 21 Jul 2022 13:04  Patient On (Oxygen Delivery Method): room air        General: Well nourished, well developed, no acute distress  HEENT: NC/AT; EOMI, PERRL, sclera nonicteric; external ears normal; no rhinorrhea or epistaxis; mucous membranes moist; oropharynx clear and without erythema  CV: NR, RR; no appreciable r/m/g  Lungs: CTAB, no increased work of breathing  Abdomen: Bowel sounds present; soft, NTND  MSK: Vertebral spine non-tender to palpation  Neuro: AAOx3; cranial nerves II-XII intact; strength 5/5 in upper and lower extremities; sensation to light touch in tact bilaterally.  no neuro deficits.  - Romberg.  able to touch finger to nose.  mildly unsteady when I had her stand up.       IMAGING/LABS/PATHOLOGY: I have personally reviewed the relevant labs, pathology, and imaging as noted in the HPI.  In addition,                          13.6   15.12 )-----------( 417      ( 21 Jul 2022 11:13 )             41.8     07-21    138  |  100  |  38<H>  ----------------------------<  239<H>  4.1   |  23  |  0.98    Ca    9.8      21 Jul 2022 11:13  Phos  3.3     07-21  Mg     2.2     07-21          ASSESSMENT/PLAN    LANIE BERTRAND is a 82y woman with no known cancer, h/o extensive smoking when younger, >40 years of smoking, admitted with gait instability for weeks to months.  Sent in by Dr. Castro orthopedist for abnormal MRI's of the spine and saw something on her "brain."  Now at [facility] undergoing work up.  Imaging shows ANTHONY mass and two brain metastases the   largest about 4cm at the left temporal lobe, another 1.6cm right frontal lobe.  Per son, she is planned for resection.  Await neurosurgery's recommendations.  We also discussed adjuvant chemo/RT.  I gave my card and RAd Onc DR. Funes - our CNS specialist.   Will continue to follow.

## 2022-07-21 NOTE — PROGRESS NOTE ADULT - ASSESSMENT
[facility] 6/23/21:   s/p successful angioplasty to the mid RCA followed by intracoronary brachytherapy.  [facility] 4/14/21: POBA of mid RCA (80%)   [facility] 3/25/21: PCI/POBA to pCX 90%     a/p  83 y/o female (former smoker) with PMHx of HTN, CAD sp PCi,  HLD, MI, peripheral neuropathy, DM and peripheral artery disease presented with abnl imaging on MRI as outpt      #Brain/ Lung  mass   -CT head/ CT chest noted   -work up hem/onc   -neurosx fu   -pending MRI brain   -s/p bronchoscopy  -Dex 4q6 per neuro sx   -pulm fu     #CAD sp PCI   -resume ASA given PCI hx once cleared by IR -- currently SP lung Bx  -check ecg   -possible plan for tumor debulking; check echo for optimization for possible sx      dvt ppx

## 2022-07-21 NOTE — PROGRESS NOTE ADULT - SUBJECTIVE AND OBJECTIVE BOX
Chief complaint  Patient is a 82y old  Female who presents with a chief complaint of abnormal imaging (20 Jul 2022 15:58)   Review of systems  Patient in bed, looks comfortable, no hypoglycemic episodes.    Labs and Fingersticks  CAPILLARY BLOOD GLUCOSE      POCT Blood Glucose.: 205 mg/dL (21 Jul 2022 12:02)  POCT Blood Glucose.: 219 mg/dL (21 Jul 2022 08:35)  POCT Blood Glucose.: 276 mg/dL (20 Jul 2022 21:10)  POCT Blood Glucose.: 261 mg/dL (20 Jul 2022 18:28)  POCT Blood Glucose.: 253 mg/dL (20 Jul 2022 16:56)      Anion Gap, Serum: 15 (07-21 @ 11:13)  Anion Gap, Serum: 14 (07-20 @ 06:50)      Calcium, Total Serum: 9.8 (07-21 @ 11:13)  Calcium, Total Serum: 9.8 (07-20 @ 06:50)          07-21    138  |  100  |  38<H>  ----------------------------<  239<H>  4.1   |  23  |  0.98    Ca    9.8      21 Jul 2022 11:13  Phos  3.3     07-21  Mg     2.2     07-21                          13.6   15.12 )-----------( 417      ( 21 Jul 2022 11:13 )             41.8     Medications  MEDICATIONS  (STANDING):  atorvastatin 40 milliGRAM(s) Oral at bedtime  chlorhexidine 2% Cloths 1 Application(s) Topical daily  dexAMETHasone     Tablet 4 milliGRAM(s) Oral every 6 hours  dextrose 5%. 1000 milliLiter(s) (50 mL/Hr) IV Continuous <Continuous>  dextrose 5%. 1000 milliLiter(s) (100 mL/Hr) IV Continuous <Continuous>  dextrose 50% Injectable 25 Gram(s) IV Push once  dextrose 50% Injectable 12.5 Gram(s) IV Push once  dextrose 50% Injectable 25 Gram(s) IV Push once  gabapentin 200 milliGRAM(s) Oral at bedtime  glucagon  Injectable 1 milliGRAM(s) IntraMuscular once  hydrochlorothiazide 25 milliGRAM(s) Oral daily  insulin glargine Injectable (LANTUS) 32 Unit(s) SubCutaneous at bedtime  insulin lispro (ADMELOG) corrective regimen sliding scale   SubCutaneous three times a day before meals  insulin lispro (ADMELOG) corrective regimen sliding scale   SubCutaneous at bedtime  insulin lispro Injectable (ADMELOG) 10 Unit(s) SubCutaneous three times a day before meals  levETIRAcetam 500 milliGRAM(s) Oral two times a day  losartan 100 milliGRAM(s) Oral daily  metoprolol tartrate 50 milliGRAM(s) Oral three times a day      Physical Exam  Culture - Fungal, Bronchial (collected 07-20-22 @ 16:52)  Source: .Bronchial Bronchial Lavage  Preliminary Report (07-21-22 @ 10:49):    Testing in progress    Culture - Bronchial (collected 07-20-22 @ 16:52)  Source: .Bronchial Bronchial Lavage  Gram Stain (07-20-22 @ 22:37):    Rare polymorphonuclear leukocytes per low power field    No squamous epithelial cells per low power field    Rare Gram positive cocci in pairs per oil power field      General: Patient comfortable in bed  Vital Signs Last 12 Hrs  T(F): 98.2 (07-21-22 @ 13:04), Max: 98.3 (07-21-22 @ 01:33)  HR: 68 (07-21-22 @ 13:04) (53 - 68)  BP: 125/83 (07-21-22 @ 13:04) (125/83 - 129/60)  BP(mean): --  RR: 18 (07-21-22 @ 13:04) (18 - 18)  SpO2: 97% (07-21-22 @ 13:04) (94% - 97%)  Neck: No palpable thyroid nodules.  CVS: S1S2, No murmurs  Respiratory: No wheezing, no crepitations  GI: Abdomen soft, bowel sounds positive  Musculoskeletal:  edema lower extremities.   Skin: No skin rashes, no ecchymosis    Diagnostics    Metanephrine, Plasma: AM Sched. Collection: 21-Jul-2022 06:00 (07-20 @ 10:54)  Aldosterone, Serum: AM Sched. Collection: 21-Jul-2022 06:00 (07-20 @ 10:54)  Cortisol AM, Serum: AM Sched. Collection: 21-Jul-2022 06:00 (07-20 @ 10:54)

## 2022-07-21 NOTE — CONSULT NOTE ADULT - ATTENDING COMMENTS
HPI as per resident note, personally verified by me. Denies any focal neurologic deficits or seizures. Only complains of dizziness and "balance issues" which she attributes to her peripheral neuropathy from diabetes.    Mental status - Awake, Alert, Oriented to person, place, and time. Speech fluent and pressured at times, repetition and naming intact. Follows simple and complex commands. Attention/concentration (can deviate from conversations from time to time but returns to topic), recent and remote memory (including registration 3/3 and recall 2/3), and fund of knowledge intact.    Cranial nerves - PERRLA, VFF, EOMI, face sensation (V1-V3) intact b/l, facial strength intact without asymmetry b/l, hearing intact b/l, palate with symmetric elevation, trapezius 5/5 strength b/l, tongue midline on protrusion with full lateral movement    Motor - Normal bulk and tone throughout. No pronator drift. Strength 5/5 all except for LUE 4+/5    Sensation - Light touch/temperature intact throughout    DTR's -             Biceps      Triceps     Brachioradialis      Patellar    Ankle    Toes/plantar response  R             2+             2+                  2+             1+            1+                mute  L              2+             2+                 2+              1+           1+                 mute    Coordination - Finger to Nose intact b/l. No tremors appreciated    Gait and station - Not willing to participate due to "balance issue." Romberg (-)    A/P:  Brain metastases with vasogenic edema  Lung cancer  HTN  CAD s/p stent  DM type 2    - Likely brain metastases from lung primary leading to some mild L sided weakness and possible imbalance. She has received dexamethasone infusion. No other focal neurologic deficits or seizures  - Appreciate NRS and heme/onc recommendations, for debulking surgery and likely chemo +/- XRT after  - Continue dexamethasone 4mg [id number] for now  - Continue Keppra 500mg PO BID for seizure prophylaxis  - rEEG to evaluate for focal slowing, epileptiform discharges, or seizures and establish baseline  - Continue to address above medical problems, as you are doing  - Will continue to follow patient with you, as needed

## 2022-07-21 NOTE — CONSULT NOTE ADULT - ASSESSMENT
82F (former smoker) PMHx of HTN, CAD, HLD, MI, peripheral neuropathy, DM and peripheral artery disease who initially presented to the ED after incidental abnormal findings of brain on MRI spine.  82F (former smoker) PMHx of HTN, CAD s/p stent, HLD, MI, DM c/b peripheral neuropathy and peripheral artery disease who initially presented to the ED after incidental findings of brain lesions on MRI cervical spine. Pt sent to ED to obtain dedicated MRI brain. MRI demonstrating multiple peripherally enhancing, centrally necrotic lesions including Right temporal lobe, Left parafalcine and Left frontal cortex concerning for brain metastasis from primary lung CA. Neuro consulted for further management. Vitals stable. CBC w/ leukocytosis and thrombocytosis. CMP w/ elevated BUN and glucose. Upon exam, no focal findings.     IMPRESSION: Abnormal MRI findings likely metastatic brain lesions i/s/o known primary lung CA. Pending pathology from bronchoscopy. Pt w/ ~3 months of imbalance and unsteady gait likely 2/2 metastatic findings although pt w/ extensive chronic back issues.      RECOMMENDATIONS:  [ ] Follow-up pathology results per pulm  [ ] Follow-up neurosurgery recommendations in terms of potential surgery   [ ] Follow-up radiation-oncology recommendations   [ ] Continue decadron 4mg Q6H  [ ] Continue Keppra 500mg BID for seizure ppx  [ ] Rest of care per primary team     Case to be seen and d/w Neuro attending in AM. Recommendations not finalized until attending attestation.  82F (former smoker) PMHx of HTN, CAD s/p stent, HLD, MI, DM c/b peripheral neuropathy and peripheral artery disease who initially presented to the ED after incidental findings of brain lesions on MRI cervical spine. Pt sent to ED to obtain dedicated MRI brain by her orthopedic surgeon, Dr. Castro. MRI demonstrating multiple peripherally enhancing, centrally necrotic lesions including Right temporal lobe, Left parafalcine and Left frontal cortex concerning for brain metastasis from primary lung CA. Neuro consulted for further management. Vitals stable. CBC w/ leukocytosis and thrombocytosis. CMP w/ elevated BUN and glucose. Upon exam, no focal findings.     IMPRESSION: Abnormal MRI findings likely metastatic brain lesions i/s/o known primary lung CA. Pending pathology from bronchoscopy. Pt w/ ~3 months of imbalance and unsteady gait likely 2/2 metastatic findings and/or pt's extensive chronic back issues    RECOMMENDATIONS:  [ ] Follow-up pathology results per pulm  [ ] Follow-up neurosurgery recommendations in terms of potential debulking surgery   [ ] Follow-up radiation-oncology and medical oncology recommendations   [ ] Continue decadron 4mg Q6H  [ ] Continue Keppra 500mg BID for seizure ppx  [ ] Rest of care per primary team     Case to be seen and d/w Neuro attending in AM. Recommendations not finalized until attending attestation.

## 2022-07-21 NOTE — PROGRESS NOTE ADULT - ASSESSMENT
Lung and brain masses likely a metastatic process. Spoke with NS who recommends surgery. Spoke with son and patient and they are considering options. Await pathology from bronchoscopy. patient has also been seen by rad/Onc. Getting steroids and keppra. Further med onc plan once pathology results are finalized.     Thrombocytosis is reactive.

## 2022-07-21 NOTE — PROGRESS NOTE ADULT - ASSESSMENT
83 y/o female (former smoker) with PMHx of HTN, CAD, HLD, MI, peripheral neuropathy, DM and peripheral artery disease presented to the ED for imaging for her head due to abnormal spine MRI by her orthopedist (Dr. Castro) during workup for her spine for her chronic back problems. The orthopedist noted something in her brain. Otherwise pt has no symptoms, stated that since 04/2022 she has had gait/balance issues. Endorsed that if she sits for a while and gets up, she feels like she is "drunk" and feels off. Denied chest pain, SOB, fevers, chills, numbness/paresthesias, muscular weakness, dizziness, abdominal pain, headaches.    brain mass  - likely metastatic lung cancer  - await mri  - decadron  - keppra  - seen by NS  - for poss resection    lung mass  - pulm following  - bronch was done  - follow up path    diabetes  - hold oral hypoglycemics  - insulin ss  - hgb a1c  - elevated sugars are expected  - endo consult following    HTN  -  cont home meds    cad  - s/p stent  - hold asa and plavix for poss brain surgery    dvt px  - scd's

## 2022-07-21 NOTE — PROGRESS NOTE ADULT - PROBLEM SELECTOR PLAN 1
Will increase Lantus to 32u at bedtime.  Will increase Admelog to 10u before each meal and continue Admelog correction scale coverage. Will continue monitoring FS and FU, will adjust dose as steroids are tapered/dc.  Patient counseled for compliance with consistent low carb diet and exercise as tolerated outpatient.

## 2022-07-21 NOTE — PROGRESS NOTE ADULT - ASSESSMENT
Assessment  DMT2: 82y Female with DM T2 with hyperglycemia, A1C 8.4%, was on oral meds and insulin at home, now on basal bolus insulin, increased dose yesterday, blood sugars improving though still running high in the setting of steroid management, no hypoglycemic episodes. Patient is s/p bronchoscopy with biopsy, NAD, remains on dexamethasone.  Brain Mass: on meds, monitored, FU Neurosurgery.  Lung Mass: s/p bronchoscopy, CA workup in progress, monitored.  Thyroid Nodules: seen on imaging, B/L subcentimeter thyroid nodules, she denies neck mass/pain/dysphagia, euthyroid, reports known history and is being followed by Dr. Richardson.  Adrenal Nodule: seen on imaging, 1.2 cm left thyroid nodule, unknown history. Adrenal labs pending.        Troy Thibodeaux MD  [phone]

## 2022-07-21 NOTE — PROGRESS NOTE ADULT - SUBJECTIVE AND OBJECTIVE BOX
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; ataxia; frontal lobe syndrome    INTERVAL HISTORY: s/p bronchoscopy with endobronchial brushings/biopsies of an obstructing left upper lobe endobronchial lesion; no shortness of breath or hypoxemia on room air; no cough, sputum production, hemoptysis, chest congestion or wheeze; no fevers, chills or sweats; no chest pain/pressure or palpitations; ongoing decreased inhibition, pressure speech and ataxia    REVIEW OF SYSTEMS:  Constitutional: As per interval history  HEENT: Within normal limits  CV: As per interval history  Resp: As per interval history  GI: Within normal limits   : Within normal limits  Musculoskeletal: Within normal limits  Skin: Within normal limits  Neurological: ataxia - pressured speech  Psychiatric: Within normal limits  Endocrine: Within normal limits  Hematologic/Lymphatic: Within normal limits  Allergic/Immunologic: Within normal limits    MEDICATIONS:     Pulmonary "    Anti-microbials:    Cardiovascular:  hydrochlorothiazide 25 milliGRAM(s) Oral daily  losartan 100 milliGRAM(s) Oral daily  metoprolol tartrate 50 milliGRAM(s) Oral three times a day    Other:  atorvastatin 40 milliGRAM(s) Oral at bedtime  chlorhexidine 2% Cloths 1 Application(s) Topical daily  dexAMETHasone     Tablet 4 milliGRAM(s) Oral every 6 hours  dextrose 5%. 1000 milliLiter(s) IV Continuous <Continuous>  dextrose 5%. 1000 milliLiter(s) IV Continuous <Continuous>  dextrose 50% Injectable 25 Gram(s) IV Push once  dextrose 50% Injectable 12.5 Gram(s) IV Push once  dextrose 50% Injectable 25 Gram(s) IV Push once  gabapentin 200 milliGRAM(s) Oral at bedtime  glucagon  Injectable 1 milliGRAM(s) IntraMuscular once  insulin glargine Injectable (LANTUS) 26 Unit(s) SubCutaneous at bedtime  insulin lispro (ADMELOG) corrective regimen sliding scale   SubCutaneous three times a day before meals  insulin lispro (ADMELOG) corrective regimen sliding scale   SubCutaneous at bedtime  insulin lispro Injectable (ADMELOG) 8 Unit(s) SubCutaneous three times a day before meals  levETIRAcetam 500 milliGRAM(s) Oral two times a day    MEDICATIONS  (PRN):  dextrose Oral Gel 15 Gram(s) Oral once PRN Blood Glucose LESS THAN 70 milliGRAM(s)/deciliter    OBJECTIVE:    Daily Height in cm: 166.37 (20 Jul 2022 14:44)      POCT Blood Glucose.: 276 mg/dL (20 Jul 2022 21:10)  POCT Blood Glucose.: 261 mg/dL (20 Jul 2022 18:28)  POCT Blood Glucose.: 253 mg/dL (20 Jul 2022 16:56)  POCT Blood Glucose.: 370 mg/dL (20 Jul 2022 09:49)      PHYSICAL EXAM:       ICU Vital Signs Last 24 Hrs  T(C): 36.8 (21 Jul 2022 01:33), Max: 36.8 (20 Jul 2022 10:58)  T(F): 98.3 (21 Jul 2022 01:33), Max: 98.3 (21 Jul 2022 01:33)  HR: 53 (21 Jul 2022 05:30) (53 - 71)  BP: 129/60 (21 Jul 2022 05:30) (98/73 - 131/59)  BP(mean): --  ABP: --  ABP(mean): --  RR: 18 (21 Jul 2022 01:33) (14 - 18)  SpO2: 94% (21 Jul 2022 01:33) (93% - 99%) on room air    General: Awake. Alert. Cooperative. No distress. Appears stated age. Loquacious. Pressured speech.	  HEENT:  Atraumatic. Normocephalic. Anicteric. Normal oral mucosa. PERRL. EOMI.  Neck: Supple. Trachea midline. Thyroid without enlargement/tenderness/nodules. No carotid bruit. No JVD.	  Cardiovascular: Regular rate and rhythm. S1 S2 normal. No murmurs, rubs or gallops.  Respiratory: Respirations unlabored. Clear to auscultation and percussion bilaterally. No curvature.  Abdomen: Soft. Non-tender. Non-distended. No organomegaly. No masses. Normal bowel sounds.  Extremities: Warm to touch. No clubbing or cyanosis. No pedal edema.  Pulses: 2+ peripheral pulses all extremities.	  Skin: Normal skin color. No rashes or lesions. No ecchymoses. No cyanosis. Warm to touch.  Lymph Nodes: Cervical, supraclavicular and axillary nodes normal  Neurological: Ataxic gait. A and O x 3  Psychiatry: Appropriate mood and affect.    LABS:                          13.3   11.97 )-----------( 329       20 Jul 2022 06:50 )             41.5     CBC    WBC  11.97 <==, 9.73 <==    Hemoglobin  13.3 <<==, 13.8 <<==    Hematocrit  41.5 <==, 43.2 <==    Platelets  329 <==, 387 <==      140  |  104  |  36<H>  ----------------------------<  328<H>    07-20  4.7   |  22  |  1.08      LYTES    sodium  140 <==, 142 <==    potassium   4.7 <==, 3.8 <==    chloride  104 <==, 103 <==    carbon dioxide  22 <==, 25 <==    =============================================================================================  RENAL FUNCTION:    Creatinine:   1.08  <<==, 0.90  <<==    BUN:   36 <==, 29 <==    ============================================================================================    calcium   9.8 <==, 10.3 <==    phos   3.4 <==    mag   1.5 <==    ============================================================================================  LFTs    AST:   16 <==     ALT:  9  <==     AP:  96  <=    Bili:  0.4  <=    PT/INR - ( 18 Jul 2022 21:10 )   PT: 11.8 sec;   INR: 1.03 ratio      PTT - ( 18 Jul 2022 21:10 )  PTT: 29.2 sec    MICROBIOLOGY:   COVID-19 PCR . (07.18.22 @ 23:25)   COVID-19 PCR: NotDetec    Culture - Bronchial (07.20.22 @ 16:52)   Gram Stain:   Rare polymorphonuclear leukocytes per low power field   No squamous epithelial cells per low power field   Rare Gram positive cocci in pairs per oil power field   Specimen Source: .Bronchial Bronchial Lavage     RADIOLOGY:  [x ] Chest radiographs reviewed and interpreted by me    EXAM:  CT ABDOMEN AND PELVIS IC                        EXAM:  CT CHEST IC                          PROCEDURE DATE:  07/18/2022      FINDINGS:  CHEST:  LUNGS AND LARGE AIRWAYS: There is a heterogeneous left upper lobe mass   approximately measuring 3.3 x 2.6 cm (2, 28) obstructing the left apical   posterior bronchus with partial atelectasis of the left upper lobe. Few   scattered bilateral pulmonary nodules measuring up to 3 mm in the right   upper lobe (2, 39).  PLEURA: No pleural effusion.  VESSELS: Atherosclerotic changes of the aorta and coronary arteries.  HEART: Heart size is normal. No pericardial effusion.  MEDIASTINUM AND SIXTO: Soft tissue contiguous with mass versus adenopathy   measuring 1.7 x 1.5 cm (2, 33).  CHEST WALL AND LOWER NECK: Bilateral subcentimeter hypoattenuating   thyroid nodules.    ABDOMEN AND PELVIS:  LIVER: Within normal limits.  BILE DUCTS: Normal caliber.  GALLBLADDER: Within normal limits.  SPLEEN: Within normal limits.  PANCREAS: Within normal limits.  ADRENALS: Indeterminant left adrenal nodule measuring 1.2 cm.  KIDNEYS/URETERS: No hydronephrosis. Renal cysts and subcentimeter   hypoattenuating foci bilaterally, too small to characterize.    BLADDER: Within normal limits.  REPRODUCTIVE ORGANS: Uterus and adnexa within normal limits.    BOWEL: Colonic diverticula withoutevidence of acute diverticulitis. No   bowel obstruction. Appendix is normal.  PERITONEUM: No ascites.  VESSELS: Atherosclerotic changes.  RETROPERITONEUM/LYMPH NODES: No lymphadenopathy.  ABDOMINAL WALL: Within normal limits.  BONES: Degenerative changes. Grade 1 anterolisthesis of L5 on S1. Status   post right shoulder arthroplasty.    IMPRESSION:  Left upper lobe lung mass with partial atelectasis of the left upper   lobe, concerning for primary lung neoplasm. Few scattered sub-4 mm   pulmonary nodules, indeterminate.    Indeterminant left adrenal nodule for which contrast enhanced MRI is   recommended for further evaluation.    DEEPAK HARRINGTON MD; Resident Radiologist  This document has been electronically signed.  HAYLEY DENTON MD; Attending Radiologist  This document has been electronically signed. Jul 19 2022  9:02AM  ---------------------------------------------------------------------------------------------------------------  EXAM:  CT BRAIN                          PROCEDURE DATE:  07/18/2022      FINDINGS:  A cystic versus centrally necrotic mass in the right temporal lobe   measures approximately 4.8 x 3.2 x 3.2 cm (602:15 and 601:33).    There moderate tolarge amount of vasogenic edema in the right frontal,   parietal and temporal lobes.  There is regional mass effect in the right   cerebral hemisphere with partial effacement of the right lateral   ventricle and third ventricle.  There is medial bulging of the right   thalamus across the midline by up to 1.4 cm (2:17).  There is   approximately 5 mm subfalcine subfalcine herniation of the right frontal   lobe underneath the cerebral falx (2:21).    There is an approximately 1.4 x 1.3 x 1.6 cm cystic versus centimeter   necrotic mass in the medial left frontal lobe.  There appears to be trace   edema surrounding this lesion, without significant mass effect.    There is no evidence of acute intracranial hemorrhage, transtentorial   herniation or acute territorial infarct.  Mild prominence the left   lateral ventricle may be related to pre-existing cerebral volume loss   rather than hydrocephalus.    The paranasal sinuses and mastoids are grossly clear.    The patient is status post cataractlens placement surgery bilaterally.    The calvarium and skull base appear within normal limits.    IMPRESSION:  Cystic versus a necrotic masses measure approximately 4.8 x 3.2 x 3.2 cm   in the right temporal lobe and 1.4 x 1.3 x 1.6 cm in the medial left   frontal lobe.    Moderate to large amount of vasogenic edema in the right frontal,   parietal and temporal lobes.  Trace edema in the left frontal lobe.    Regional mass effect in the right cerebral hemisphere with partial   effacement of theventricular system and 5 mm subfalcine herniation of   the right frontal lobe underneath the cerebral falx.    The findings may represent intracranial metastatic disease versus   glioblastoma.  Contrast-enhanced brain MRI is recommended for further   evaluation.      Findings discussed with DAVID Albarran by Dr. Harrington at 2153 hours on   7/8/2022 with readback confirmation.    DEEPAK HARRINGTON MD; Resident Radiologist  This document has been electronically signed.  PHILLY CISSE MD; Attending Radiologist  This document has been electronically signed. Jul 18 2022 10:15PM  ---------------------------------------------------------------------------------------------------------------

## 2022-07-21 NOTE — CONSULT NOTE ADULT - SUBJECTIVE AND OBJECTIVE BOX
Neurology - Consult Note    Spectra: [id number] ([facility]), [phone] ([facility])    HPI:   82F (former smoker) with PMHx of HTN, CAD, HLD, MI, peripheral neuropathy, DM and peripheral artery disease presented to the ED for imaging for her head due to abnormal spine MRI by her orthopedist (Dr. Castro) during workup for her spine for her chronic back problems. The orthopedist noted something in her brain. Otherwise pt has no symptoms, stated that since 04/2022 she has had gait/balance issues. Endorsed that if she sits for a while and gets up, she feels like she is "drunk" and feels off. Denied chest pain, SOB, fevers, chills, numbness/paresthesias, muscular weakness, dizziness, abdominal pain, headaches. MRI head demonstrating multiple peripherally enhancing, centrally necrotic lesions, one in the R temporal lobe, Left prafalcine and Left frontal cortex concerning for brain metastasis from primary lung CA. Neuro consulted for further management.     Review of Systems:  INCOMPLETE   CONSTITUTIONAL: No fevers or chills  EYES AND ENT: No visual changes or no throat pain   NECK: No pain or stiffness  RESPIRATORY: No hemoptysis or shortness of breath  CARDIOVASCULAR: No chest pain or palpitations  GASTROINTESTINAL: No melena or hematochezia  GENITOURINARY: No dysuria or hematuria  NEUROLOGICAL: +As stated in HPI above  SKIN: No itching, burning, rashes, or lesions   All other review of systems is negative unless indicated above.    Allergies: NKDA    PMHx/PSHx/Family Hx: As above, otherwise see below   Hypertension    Coronary artery disease    Hyperlipidemia    Peripheral artery disease    Myocardial infarct    Peripheral neuropathy    Diabetes    Social Hx:  Never smoker; no current use of tobacco, alcohol, or illicit drugs  Lives with ***; occupation ***, baseline functional status is ***    Medications:  MEDICATIONS  (STANDING):  atorvastatin 40 milliGRAM(s) Oral at bedtime  chlorhexidine 2% Cloths 1 Application(s) Topical daily  dexAMETHasone     Tablet 4 milliGRAM(s) Oral every 6 hours  dextrose 5%. 1000 milliLiter(s) (50 mL/Hr) IV Continuous <Continuous>  dextrose 5%. 1000 milliLiter(s) (100 mL/Hr) IV Continuous <Continuous>  dextrose 50% Injectable 25 Gram(s) IV Push once  dextrose 50% Injectable 12.5 Gram(s) IV Push once  dextrose 50% Injectable 25 Gram(s) IV Push once  gabapentin 200 milliGRAM(s) Oral at bedtime  glucagon  Injectable 1 milliGRAM(s) IntraMuscular once  hydrochlorothiazide 25 milliGRAM(s) Oral daily  insulin glargine Injectable (LANTUS) 32 Unit(s) SubCutaneous at bedtime  insulin lispro (ADMELOG) corrective regimen sliding scale   SubCutaneous three times a day before meals  insulin lispro (ADMELOG) corrective regimen sliding scale   SubCutaneous at bedtime  insulin lispro Injectable (ADMELOG) 10 Unit(s) SubCutaneous three times a day before meals  levETIRAcetam 500 milliGRAM(s) Oral two times a day  losartan 100 milliGRAM(s) Oral daily  metoprolol tartrate 50 milliGRAM(s) Oral three times a day    MEDICATIONS  (PRN):  dextrose Oral Gel 15 Gram(s) Oral once PRN Blood Glucose LESS THAN 70 milliGRAM(s)/deciliter    Vitals:  T(C): 36.8 (07-21-22 @ 13:04), Max: 36.8 (07-21-22 @ 01:33)  HR: 68 (07-21-22 @ 13:04) (53 - 68)  BP: 125/83 (07-21-22 @ 13:04) (125/83 - 129/60)  RR: 18 (07-21-22 @ 13:04) (18 - 18)  SpO2: 97% (07-21-22 @ 13:04) (94% - 97%)    Physical Examination: INCOMPLETE  General - non-toxic appearing male/female in no acute distress  Cardiovascular - peripheral pulses palpable, no edema  Respiratory - breathing comfortably with no incresed work of breathing    Neurologic Exam:  Mental status - Awake, Alert, Oriented to person, place, and time. Speech fluent, repetition and naming intact. Follows simple and complex commands. Attention/concentration, recent and remote memory (including registration and recall), and fund of knowledge intact    Cranial nerves - PERRLA, VFF, EOMI, face sensation (V1-V3) intact b/l, facial strength intact without asymmetry b/l, hearing intact b/l, palate with symmetric elevation, trapezius OR sternocleidomastiod 5/5 strength b/l, tongue midline on protrusion with full lateral movement    Motor - Normal bulk and tone throughout. No pronator drift.  Strength testing            Deltoid      Biceps      Triceps     Wrist Extension    Wrist Flexion     Interossei         R            5                 5               5                     5                              5                        5                 5  L             5                 5               5                     5                              5                        5                 5              Hip Flexion    Hip Extension    Knee Flexion    Knee Extension    Dorsiflexion    Plantar Flexion  R              5                         5                       5                           5                            5                          5  L              5                         5                        5                           5                            5                          5    Sensation - Light touch/temperature OR pain/vibration intact throughout    DTR's -             Biceps      Triceps     Brachioradialis      Patellar    Ankle    Toes/plantar response  R             2+             2+                  2+                       2+            2+                 Down  L              2+             2+                 2+                        2+           2+                 Down    Coordination - Finger to Nose intact b/l. No tremors appreciated    Gait and station - Normal casual gait. Romberg (-)    Labs:                        13.6   15.12 )-----------( 417      ( 21 Jul 2022 11:13 )             41.8     07-21    138  |  100  |  38<H>  ----------------------------<  239<H>  4.1   |  23  |  0.98    Ca    9.8      21 Jul 2022 11:13  Phos  3.3     07-21  Mg     2.2     07-21      CAPILLARY BLOOD GLUCOSE  POCT Blood Glucose.: 251 mg/dL (21 Jul 2022 16:43)    Culture - Fungal, Bronchial (collected 20 Jul 2022 16:52)  Source: .Bronchial Bronchial Lavage  Preliminary Report (21 Jul 2022 10:49):    Testing in progress    Culture - Bronchial (collected 20 Jul 2022 16:52)  Source: .Bronchial Bronchial Lavage  Gram Stain (20 Jul 2022 22:37):    Rare polymorphonuclear leukocytes per low power field    No squamous epithelial cells per low power field    Rare Gram positive cocci in pairs per oil power field    Culture - Acid Fast - Bronchial w/Smear (collected 20 Jul 2022 16:52)  Source: .Bronchial Bronchial Lavage    Radiology:  < from: MR Head w/wo IV Cont (07.20.22 @ 17:41) >  IMPRESSION:  Right temporal lobe peripherally enhancing, centrally necrotic 3.1 x 2.6   cm mass demonstrating mild diffusion restriction. There is surrounding   vasogenic edema. 8 mm leftward midline shift. Right uncal herniation.   Additional left parafalcine 1.3 x 0.9 cm rim-enhancing cystic lesion with   mild surrounding vasogenic edema. Tiny 0.3 x 0.3 cm rim-enhancing lesion   within the left frontal cortex. Findings are concerning for metastatic   disease in the context of patient's known primary lung cancer. The left   lateral ventricle is mildly dilated.    < end of copied text >       Neurology - Consult Note    Spectra: [phone] ([facility]), [phone] ([facility])    HPI:   82F (former smoker) PMHx of HTN, CAD s/p stent, HLD, MI, DM c/b peripheral neuropathy and peripheral artery disease presented to the ED for imaging for her head due to abnormal spine MRI by her orthopedist (Dr. Castro) during workup for her spine for her chronic back problems. The orthopedist noted something in her brain. Otherwise pt has no symptoms, stated that since 2022 she has had gait/balance issues. Endorsed that if she sits for a while and gets up, she feels like she is "drunk" and feels off. Denied chest pain, SOB, fevers, chills, numbness/paresthesias, muscular weakness, dizziness, abdominal pain, headaches. MRI head demonstrating multiple peripherally enhancing, centrally necrotic lesions including Right temporal lobe, Left parafalcine and Left frontal cortex concerning for brain metastasis from primary lung CA. Neuro consulted for further management.     Review of Systems:  CONSTITUTIONAL: No fevers or chills  EYES AND ENT: No visual changes or no throat pain   NECK: No pain or stiffness  RESPIRATORY: No hemoptysis or shortness of breath  CARDIOVASCULAR: No chest pain or palpitations  GASTROINTESTINAL: No melena or hematochezia  GENITOURINARY: No dysuria or hematuria  NEUROLOGICAL: +As stated in HPI above  SKIN: No itching, burning, rashes, or lesions   All other review of systems is negative unless indicated above.    Allergies: NKDA    PMHx/PSHx/Family Hx: As above, otherwise see below   Hypertension    Coronary artery disease    Hyperlipidemia    Peripheral artery disease    Myocardial infarct    Peripheral neuropathy    Diabetes    Social Hx:  Former smoker, 1ppd x40 years, quit 18 years ago after one of her sons ; no current use of tobacco, alcohol, or illicit drugs    Medications:  MEDICATIONS  (STANDING):  atorvastatin 40 milliGRAM(s) Oral at bedtime  chlorhexidine 2% Cloths 1 Application(s) Topical daily  dexAMETHasone     Tablet 4 milliGRAM(s) Oral every 6 hours  dextrose 5%. 1000 milliLiter(s) (50 mL/Hr) IV Continuous <Continuous>  dextrose 5%. 1000 milliLiter(s) (100 mL/Hr) IV Continuous <Continuous>  dextrose 50% Injectable 25 Gram(s) IV Push once  dextrose 50% Injectable 12.5 Gram(s) IV Push once  dextrose 50% Injectable 25 Gram(s) IV Push once  gabapentin 200 milliGRAM(s) Oral at bedtime  glucagon  Injectable 1 milliGRAM(s) IntraMuscular once  hydrochlorothiazide 25 milliGRAM(s) Oral daily  insulin glargine Injectable (LANTUS) 32 Unit(s) SubCutaneous at bedtime  insulin lispro (ADMELOG) corrective regimen sliding scale   SubCutaneous three times a day before meals  insulin lispro (ADMELOG) corrective regimen sliding scale   SubCutaneous at bedtime  insulin lispro Injectable (ADMELOG) 10 Unit(s) SubCutaneous three times a day before meals  levETIRAcetam 500 milliGRAM(s) Oral two times a day  losartan 100 milliGRAM(s) Oral daily  metoprolol tartrate 50 milliGRAM(s) Oral three times a day    MEDICATIONS  (PRN):  dextrose Oral Gel 15 Gram(s) Oral once PRN Blood Glucose LESS THAN 70 milliGRAM(s)/deciliter    Vitals:  T(C): 36.8 (22 @ 13:04), Max: 36.8 (22 @ 01:33)  HR: 68 (22 @ 13:04) (53 - 68)  BP: 125/83 (22 @ 13:04) (125/83 - 129/60)  RR: 18 (22 @ 13:04) (18 - 18)  SpO2: 97% (22 @ 13:04) (94% - 97%)    Physical Examination:  General - non-toxic appearing female in no acute distress  Cardiovascular - peripheral pulses palpable, no edema  Respiratory - breathing comfortably with no increased work of breathing    Neurologic Exam:  Mental status - Awake, Alert, Oriented to person, place, and time. Speech fluent and pressured at times, repetition and naming intact. Follows simple and complex commands. Attention/concentration (can deviate from conversations from time to time but returns to topic), recent and remote memory (including registration 3/3 and recall 2/3), and fund of knowledge intact.    Cranial nerves - PERRLA, VFF, EOMI, face sensation (V1-V3) intact b/l, facial strength intact without asymmetry b/l, hearing intact b/l, palate with symmetric elevation, trapezius 5/5 strength b/l, tongue midline on protrusion with full lateral movement    Motor - Normal bulk and tone throughout. No pronator drift.    Strength testing            Deltoid      Biceps      Triceps     Wrist Extension    Wrist Flexion     Interossei         R            5              5               5                    5                    5                     5                 5  L             5              5               5                     5                   5                      5                 5              Hip Flexion    Hip Extension    Knee Flexion    Knee Extension    Dorsiflexion    Plantar Flexion  R               5                        5                   5                   5                            5                   5  L               5                        5                    5                   5                            5                   5    Sensation - Light touch/temperature intact throughout    DTR's -             Biceps      Triceps     Brachioradialis      Patellar    Ankle    Toes/plantar response  R             2+             2+                  2+             1+            1+                mute  L              2+             2+                 2+              1+           1+                 mute    Coordination - Finger to Nose intact b/l. No tremors appreciated    Gait and station - Not willing to participate due to "balance issue." Romberg (-)    Labs:                        13.6   15.12 )-----------( 417      ( 2022 11:13 )             41.8     07-21    138  |  100  |  38<H>  ----------------------------<  239<H>  4.1   |  23  |  0.98    Ca    9.8      2022 11:13  Phos  3.3     07-21  Mg     2.2     07-21      CAPILLARY BLOOD GLUCOSE  POCT Blood Glucose.: 251 mg/dL (2022 16:43)    Culture - Fungal, Bronchial (collected 2022 16:52)  Source: .Bronchial Bronchial Lavage  Preliminary Report (2022 10:49):    Testing in progress    Culture - Bronchial (collected 2022 16:52)  Source: .Bronchial Bronchial Lavage  Gram Stain (2022 22:37):    Rare polymorphonuclear leukocytes per low power field    No squamous epithelial cells per low power field    Rare Gram positive cocci in pairs per oil power field    Culture - Acid Fast - Bronchial w/Smear (collected 2022 16:52)  Source: .Bronchial Bronchial Lavage    Radiology:  < from: MR Head w/wo IV Cont (22 @ 17:41) >  IMPRESSION:  Right temporal lobe peripherally enhancing, centrally necrotic 3.1 x 2.6   cm mass demonstrating mild diffusion restriction. There is surrounding   vasogenic edema. 8 mm leftward midline shift. Right uncal herniation.   Additional left parafalcine 1.3 x 0.9 cm rim-enhancing cystic lesion with   mild surrounding vasogenic edema. Tiny 0.3 x 0.3 cm rim-enhancing lesion   within the left frontal cortex. Findings are concerning for metastatic   disease in the context of patient's known primary lung cancer. The left   lateral ventricle is mildly dilated.  < end of copied text >       Neurology - Consult Note    Spectra: [phone] ([facility]), [phone] ([facility])    HPI:   82F (former smoker) PMHx of HTN, CAD s/p stent, HLD, MI, DM c/b peripheral neuropathy and peripheral artery disease presented to the ED for imaging for her head due to abnormal spine MRI by her orthopedist (Dr. Castro) during workup for her spine for her chronic back problems. The orthopedist noted something in her brain. Otherwise pt has no symptoms, stated that since 2022 she has had gait/balance issues. Endorsed that if she sits for a while and gets up, she feels like she is "drunk" and feels off. Denied chest pain, SOB, fevers, chills, numbness/paresthesias, muscular weakness, dizziness, abdominal pain, headaches. MRI head demonstrating multiple peripherally enhancing, centrally necrotic lesions including Right temporal lobe, Left parafalcine and Left frontal cortex concerning for brain metastasis from primary lung CA. Neuro consulted for further management.     Review of Systems:  CONSTITUTIONAL: No fevers or chills  EYES AND ENT: No visual changes or no throat pain   NECK: No pain or stiffness  RESPIRATORY: No hemoptysis or shortness of breath  CARDIOVASCULAR: No chest pain or palpitations  GASTROINTESTINAL: No melena or hematochezia  GENITOURINARY: No dysuria or hematuria  NEUROLOGICAL: +As stated in HPI above  SKIN: No itching, burning, rashes, or lesions   All other review of systems is negative unless indicated above.    Allergies: NKDA    PMHx/PSHx/Family Hx: As above, otherwise see below   Hypertension    Coronary artery disease    Hyperlipidemia    Peripheral artery disease    Myocardial infarct    Peripheral neuropathy    Diabetes    Social Hx:  Former smoker, 1ppd x40 years, quit 18 years ago after one of her sons ; no current use of tobacco, alcohol, or illicit drugs    Medications:  MEDICATIONS  (STANDING):  atorvastatin 40 milliGRAM(s) Oral at bedtime  chlorhexidine 2% Cloths 1 Application(s) Topical daily  dexAMETHasone     Tablet 4 milliGRAM(s) Oral every 6 hours  dextrose 5%. 1000 milliLiter(s) (50 mL/Hr) IV Continuous <Continuous>  dextrose 5%. 1000 milliLiter(s) (100 mL/Hr) IV Continuous <Continuous>  dextrose 50% Injectable 25 Gram(s) IV Push once  dextrose 50% Injectable 12.5 Gram(s) IV Push once  dextrose 50% Injectable 25 Gram(s) IV Push once  gabapentin 200 milliGRAM(s) Oral at bedtime  glucagon  Injectable 1 milliGRAM(s) IntraMuscular once  hydrochlorothiazide 25 milliGRAM(s) Oral daily  insulin glargine Injectable (LANTUS) 32 Unit(s) SubCutaneous at bedtime  insulin lispro (ADMELOG) corrective regimen sliding scale   SubCutaneous three times a day before meals  insulin lispro (ADMELOG) corrective regimen sliding scale   SubCutaneous at bedtime  insulin lispro Injectable (ADMELOG) 10 Unit(s) SubCutaneous three times a day before meals  levETIRAcetam 500 milliGRAM(s) Oral two times a day  losartan 100 milliGRAM(s) Oral daily  metoprolol tartrate 50 milliGRAM(s) Oral three times a day    MEDICATIONS  (PRN):  dextrose Oral Gel 15 Gram(s) Oral once PRN Blood Glucose LESS THAN 70 milliGRAM(s)/deciliter    Vitals:  T(C): 36.8 (22 @ 13:04), Max: 36.8 (22 @ 01:33)  HR: 68 (22 @ 13:04) (53 - 68)  BP: 125/83 (22 @ 13:04) (125/83 - 129/60)  RR: 18 (22 @ 13:04) (18 - 18)  SpO2: 97% (22 @ 13:04) (94% - 97%)    Physical Examination:  General - non-toxic appearing female in no acute distress  Cardiovascular - peripheral pulses palpable, no edema  Respiratory - breathing comfortably with no increased work of breathing    Neurologic Exam:  Mental status - Awake, Alert, Oriented to person, place, and time. Speech fluent and pressured at times, repetition and naming intact. Follows simple and complex commands. Attention/concentration (can deviate from conversations from time to time but returns to topic), recent and remote memory (including registration 3/3 and recall 2/3), and fund of knowledge intact.    Cranial nerves - PERRLA, VFF, EOMI, face sensation (V1-V3) intact b/l, facial strength intact without asymmetry b/l, hearing intact b/l, palate with symmetric elevation, trapezius 5/5 strength b/l, tongue midline on protrusion with full lateral movement    Motor - Normal bulk and tone throughout. No pronator drift.    Strength testing            Deltoid      Biceps      Triceps     Wrist Extension    Wrist Flexion     Interossei         R            4+             4+            4+                  4+                  4+                4+               4+  L             4+             4+            4+                 4+                  4+                4+               4+              Hip Flexion    Hip Extension    Knee Flexion    Knee Extension    Dorsiflexion    Plantar Flexion  R              4+                      4+                   4+                  4+               4+                  4+   L              4+                      4+                   4+                  4+                4+                 4+    Sensation - Light touch/temperature intact throughout    DTR's -             Biceps      Triceps     Brachioradialis      Patellar    Ankle    Toes/plantar response  R             2+             2+                  2+             1+            1+                mute  L              2+             2+                 2+              1+           1+                 mute    Coordination - Finger to Nose intact b/l. No tremors appreciated    Gait and station - Not willing to participate due to "balance issue." Romberg (-)    Labs:                        13.6   15.12 )-----------( 417      ( 2022 11:13 )             41.8     07-21    138  |  100  |  38<H>  ----------------------------<  239<H>  4.1   |  23  |  0.98    Ca    9.8      2022 11:13  Phos  3.3     07-  Mg     2.2     -      CAPILLARY BLOOD GLUCOSE  POCT Blood Glucose.: 251 mg/dL (2022 16:43)    Culture - Fungal, Bronchial (collected 2022 16:52)  Source: .Bronchial Bronchial Lavage  Preliminary Report (2022 10:49):    Testing in progress    Culture - Bronchial (collected 2022 16:52)  Source: .Bronchial Bronchial Lavage  Gram Stain (2022 22:37):    Rare polymorphonuclear leukocytes per low power field    No squamous epithelial cells per low power field    Rare Gram positive cocci in pairs per oil power field    Culture - Acid Fast - Bronchial w/Smear (collected 2022 16:52)  Source: .Bronchial Bronchial Lavage    Radiology:  < from: MR Head w/wo IV Cont (22 @ 17:41) >  IMPRESSION:  Right temporal lobe peripherally enhancing, centrally necrotic 3.1 x 2.6   cm mass demonstrating mild diffusion restriction. There is surrounding   vasogenic edema. 8 mm leftward midline shift. Right uncal herniation.   Additional left parafalcine 1.3 x 0.9 cm rim-enhancing cystic lesion with   mild surrounding vasogenic edema. Tiny 0.3 x 0.3 cm rim-enhancing lesion   within the left frontal cortex. Findings are concerning for metastatic   disease in the context of patient's known primary lung cancer. The left   lateral ventricle is mildly dilated.  < end of copied text >       Neurology - Consult Note    Spectra: [id number] ([facility]), [phone] ([facility])    HPI:   82F (former smoker) PMHx of HTN, CAD s/p stent, HLD, MI, DM c/b peripheral neuropathy and peripheral artery disease presented to the ED for imaging for her head due to abnormal spine MRI by her orthopedist (Dr. Castro) during workup for her spine for her chronic back problems. The orthopedist noted something in her brain. Otherwise pt has no symptoms, stated that since 2022 she has had gait/balance issues. Endorsed that if she sits for a while and gets up, she feels like she is "drunk" and feels off. Denied chest pain, SOB, fevers, chills, numbness/paresthesias, muscular weakness, dizziness, abdominal pain, headaches. MRI head demonstrating multiple peripherally enhancing, centrally necrotic lesions including Right temporal lobe, Left parafalcine and Left frontal cortex concerning for brain metastasis from primary lung CA. Neuro consulted for further management.     Review of Systems:  CONSTITUTIONAL: No fevers or chills  EYES AND ENT: No visual changes or no throat pain   NECK: No pain or stiffness  RESPIRATORY: No hemoptysis or shortness of breath  CARDIOVASCULAR: No chest pain or palpitations  GASTROINTESTINAL: No melena or hematochezia  GENITOURINARY: No dysuria or hematuria  NEUROLOGICAL: +As stated in HPI above  SKIN: No itching, burning, rashes, or lesions   All other review of systems is negative unless indicated above.    Allergies: NKDA    PMHx/PSHx/Family Hx: As above, otherwise see below   Hypertension    Coronary artery disease    Hyperlipidemia    Peripheral artery disease    Myocardial infarct    Peripheral neuropathy    Diabetes    Social Hx:  Former smoker, 1ppd x40 years, quit 18 years ago after one of her sons ; no current use of tobacco, alcohol, or illicit drugs    Medications:  MEDICATIONS  (STANDING):  atorvastatin 40 milliGRAM(s) Oral at bedtime  chlorhexidine 2% Cloths 1 Application(s) Topical daily  dexAMETHasone     Tablet 4 milliGRAM(s) Oral every 6 hours  dextrose 5%. 1000 milliLiter(s) (50 mL/Hr) IV Continuous <Continuous>  dextrose 5%. 1000 milliLiter(s) (100 mL/Hr) IV Continuous <Continuous>  dextrose 50% Injectable 25 Gram(s) IV Push once  dextrose 50% Injectable 12.5 Gram(s) IV Push once  dextrose 50% Injectable 25 Gram(s) IV Push once  gabapentin 200 milliGRAM(s) Oral at bedtime  glucagon  Injectable 1 milliGRAM(s) IntraMuscular once  hydrochlorothiazide 25 milliGRAM(s) Oral daily  insulin glargine Injectable (LANTUS) 32 Unit(s) SubCutaneous at bedtime  insulin lispro (ADMELOG) corrective regimen sliding scale   SubCutaneous three times a day before meals  insulin lispro (ADMELOG) corrective regimen sliding scale   SubCutaneous at bedtime  insulin lispro Injectable (ADMELOG) 10 Unit(s) SubCutaneous three times a day before meals  levETIRAcetam 500 milliGRAM(s) Oral two times a day  losartan 100 milliGRAM(s) Oral daily  metoprolol tartrate 50 milliGRAM(s) Oral three times a day    MEDICATIONS  (PRN):  dextrose Oral Gel 15 Gram(s) Oral once PRN Blood Glucose LESS THAN 70 milliGRAM(s)/deciliter    Vitals:  T(C): 36.8 (22 @ 13:04), Max: 36.8 (22 @ 01:33)  HR: 68 (22 @ 13:04) (53 - 68)  BP: 125/83 (22 @ 13:04) (125/83 - 129/60)  RR: 18 (22 @ 13:04) (18 - 18)  SpO2: 97% (22 @ 13:04) (94% - 97%)    Physical Examination:  General - non-toxic appearing female in no acute distress  Cardiovascular - peripheral pulses palpable, no edema  Respiratory - breathing comfortably with no increased work of breathing  Eyes - Fundoscopy not well visualized    Neurologic Exam:  Mental status - Awake, Alert, Oriented to person, place, and time. Speech fluent and pressured at times, repetition and naming intact. Follows simple and complex commands. Attention/concentration (can deviate from conversations from time to time but returns to topic), recent and remote memory (including registration 3/3 and recall 2/3), and fund of knowledge intact.    Cranial nerves - PERRLA, VFF, EOMI, face sensation (V1-V3) intact b/l, facial strength intact without asymmetry b/l, hearing intact b/l, palate with symmetric elevation, trapezius 5/5 strength b/l, tongue midline on protrusion with full lateral movement    Motor - Normal bulk and tone throughout. No pronator drift.    Strength testing            Deltoid      Biceps      Triceps     Wrist Extension    Wrist Flexion     Interossei         R            4+             4+            4+                  4+                  4+                4+               4+  L             4+             4+            4+                 4+                  4+                4+               4+              Hip Flexion    Hip Extension    Knee Flexion    Knee Extension    Dorsiflexion    Plantar Flexion  R              4+                      4+                   4+                  4+               4+                  4+   L              4+                      4+                   4+                  4+                4+                 4+    Sensation - Light touch/temperature intact throughout    DTR's -             Biceps      Triceps     Brachioradialis      Patellar    Ankle    Toes/plantar response  R             2+             2+                  2+             1+            1+                mute  L              2+             2+                 2+              1+           1+                 mute    Coordination - Finger to Nose intact b/l. No tremors appreciated    Gait and station - Not willing to participate due to "balance issue." Romberg (-)    Labs:                        13.6   15.12 )-----------( 417      ( 2022 11:13 )             41.8     07-    138  |  100  |  38<H>  ----------------------------<  239<H>  4.1   |  23  |  0.98    Ca    9.8      2022 11:13  Phos  3.3     -  Mg     2.2     -      CAPILLARY BLOOD GLUCOSE  POCT Blood Glucose.: 251 mg/dL (2022 16:43)    Culture - Fungal, Bronchial (collected 2022 16:52)  Source: .Bronchial Bronchial Lavage  Preliminary Report (2022 10:49):    Testing in progress    Culture - Bronchial (collected 2022 16:52)  Source: .Bronchial Bronchial Lavage  Gram Stain (2022 22:37):    Rare polymorphonuclear leukocytes per low power field    No squamous epithelial cells per low power field    Rare Gram positive cocci in pairs per oil power field    Culture - Acid Fast - Bronchial w/Smear (collected 2022 16:52)  Source: .Bronchial Bronchial Lavage    Radiology:  < from: MR Head w/wo IV Cont (22 @ 17:41) >  IMPRESSION:  Right temporal lobe peripherally enhancing, centrally necrotic 3.1 x 2.6   cm mass demonstrating mild diffusion restriction. There is surrounding   vasogenic edema. 8 mm leftward midline shift. Right uncal herniation.   Additional left parafalcine 1.3 x 0.9 cm rim-enhancing cystic lesion with   mild surrounding vasogenic edema. Tiny 0.3 x 0.3 cm rim-enhancing lesion   within the left frontal cortex. Findings are concerning for metastatic   disease in the context of patient's known primary lung cancer. The left   lateral ventricle is mildly dilated.  < end of copied text >

## 2022-07-21 NOTE — PROGRESS NOTE ADULT - SUBJECTIVE AND OBJECTIVE BOX
DATE OF SERVICE: 07-21-22 @ 14:37    Patient is a 82y old  Female who presents with a chief complaint of abnormal imaging (21 Jul 2022 13:55)      SUBJECTIVE / OVERNIGHT EVENTS:  No chest pain. No shortness of breath. No complaints. No events overnight.     MEDICATIONS  (STANDING):  atorvastatin 40 milliGRAM(s) Oral at bedtime  chlorhexidine 2% Cloths 1 Application(s) Topical daily  dexAMETHasone     Tablet 4 milliGRAM(s) Oral every 6 hours  dextrose 5%. 1000 milliLiter(s) (50 mL/Hr) IV Continuous <Continuous>  dextrose 5%. 1000 milliLiter(s) (100 mL/Hr) IV Continuous <Continuous>  dextrose 50% Injectable 25 Gram(s) IV Push once  dextrose 50% Injectable 12.5 Gram(s) IV Push once  dextrose 50% Injectable 25 Gram(s) IV Push once  gabapentin 200 milliGRAM(s) Oral at bedtime  glucagon  Injectable 1 milliGRAM(s) IntraMuscular once  hydrochlorothiazide 25 milliGRAM(s) Oral daily  insulin glargine Injectable (LANTUS) 32 Unit(s) SubCutaneous at bedtime  insulin lispro (ADMELOG) corrective regimen sliding scale   SubCutaneous three times a day before meals  insulin lispro (ADMELOG) corrective regimen sliding scale   SubCutaneous at bedtime  insulin lispro Injectable (ADMELOG) 10 Unit(s) SubCutaneous three times a day before meals  levETIRAcetam 500 milliGRAM(s) Oral two times a day  losartan 100 milliGRAM(s) Oral daily  metoprolol tartrate 50 milliGRAM(s) Oral three times a day    MEDICATIONS  (PRN):  dextrose Oral Gel 15 Gram(s) Oral once PRN Blood Glucose LESS THAN 70 milliGRAM(s)/deciliter      Vital Signs Last 24 Hrs  T(C): 36.8 (21 Jul 2022 13:04), Max: 36.8 (21 Jul 2022 01:33)  T(F): 98.2 (21 Jul 2022 13:04), Max: 98.3 (21 Jul 2022 01:33)  HR: 68 (21 Jul 2022 13:04) (53 - 68)  BP: 125/83 (21 Jul 2022 13:04) (111/64 - 129/60)  BP(mean): --  RR: 18 (21 Jul 2022 13:04) (16 - 18)  SpO2: 97% (21 Jul 2022 13:04) (94% - 97%)    Parameters below as of 21 Jul 2022 13:04  Patient On (Oxygen Delivery Method): room air      CAPILLARY BLOOD GLUCOSE      POCT Blood Glucose.: 205 mg/dL (21 Jul 2022 12:02)  POCT Blood Glucose.: 219 mg/dL (21 Jul 2022 08:35)  POCT Blood Glucose.: 276 mg/dL (20 Jul 2022 21:10)  POCT Blood Glucose.: 261 mg/dL (20 Jul 2022 18:28)  POCT Blood Glucose.: 253 mg/dL (20 Jul 2022 16:56)    I&O's Summary    20 Jul 2022 07:01  -  21 Jul 2022 07:00  --------------------------------------------------------  IN: 240 mL / OUT: 0 mL / NET: 240 mL        PHYSICAL EXAM:  GENERAL: NAD, well-developed  HEAD:  Atraumatic, Normocephalic  EYES: EOMI, PERRLA, conjunctiva and sclera clear  NECK: Supple, No JVD  CHEST/LUNG: Clear to auscultation bilaterally; No wheeze  HEART: Regular rate and rhythm; No murmurs, rubs, or gallops  ABDOMEN: Soft, Nontender, Nondistended; Bowel sounds present  EXTREMITIES:  2+ Peripheral Pulses, No clubbing, cyanosis, or edema  PSYCH: AAOx3  NEUROLOGY: non-focal  SKIN: No rashes or lesions    LABS:                        13.6   15.12 )-----------( 417      ( 21 Jul 2022 11:13 )             41.8     07-21    138  |  100  |  38<H>  ----------------------------<  239<H>  4.1   |  23  |  0.98    Ca    9.8      21 Jul 2022 11:13  Phos  3.3     07-21  Mg     2.2     07-21                RADIOLOGY & ADDITIONAL TESTS:    Imaging Personally Reviewed:    Consultant(s) Notes Reviewed:      Care Discussed with Consultants/Other Providers:

## 2022-07-21 NOTE — PROGRESS NOTE ADULT - ASSESSMENT
ASSESSMENT:    82 year old gentlewoman, former smoker, without history of intrinsic lung disease. She has a history of HTN, HLD, DM, CAD s/p PCI (1990s and 2021) and PAD s/p RLE stenting. The patient has been having difficulty ambulating over the last several months due to a sense of imbalance with impaired gait. Her family has noted a change in her personality with pressured speech and insomnia. Outpatient cervical MRI incidentally note a CNS lesion. Lumbar spine MRI performed due to chronic back pain revealed multilevel foraminal stenosis/disc bulges. 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 The patient has no shortness of breath or hypoxemia on room air. She has no cough, sputum production, chest congestion or wheeze. No fevers, chills or sweats. No chest pain/pressure or palpitations.     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 presenting with several months of ataxia - frontal lobe lesions have lead to decreased inhibition as well as pressured and loquacious speech      PLAN/RECOMMENDATIONS:    stable oxygenation on room air  no indication for pulmonary medications or antibiotics  s/p bronchoscopy for BAL and endobronchial brushings and biopsy of an obstructing tumor in the left upper lobe  neurosurgery evaluation noted    no emergent surgery indicated    awaiting MRI brain - can be done in the outpatient setting    no anticoagulation    prophylactic anticonvulsants -> keppra    decadron 4mg q6h po   endocrinology evaluation noted    insulin regimen being adjusted for steroid induced hyperglycemia  radiation oncology evaluation -> can be done in the outpatient setting  cardiology     ASA and plavix are currently on hold - patient had PCI last year -> can be restarted from the pulmonary perspective - would ask neurosurgery for their recommendations    continue lipitor/lopressor/losartan/HCTZ    Will follow with you. Plan of care discussed with the patient and her daughter-in-law at bedside, her son by phone and with Dr. Garza. No pulmonary objection to discharge    The patient and her family are deciding where they will pursue future oncologic care    Satish Hopkins MD, [facility]  [phone]  Pulmonary Medicine     ASSESSMENT:    82 year old gentlewoman, former smoker, without history of intrinsic lung disease. She has a history of HTN, HLD, DM, CAD s/p PCI (1990s and 2021) and PAD s/p RLE stenting. The patient has been having difficulty ambulating over the last several months due to a sense of imbalance with impaired gait. Her family has noted a change in her personality with pressured speech and insomnia. Outpatient cervical MRI incidentally note a CNS lesion. Lumbar spine MRI performed due to chronic back pain revealed multilevel foraminal stenosis/disc bulges. 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 The patient has no shortness of breath or hypoxemia on room air. She has no cough, sputum production, chest congestion or wheeze. No fevers, chills or sweats. No chest pain/pressure or palpitations.     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 presenting with several months of ataxia - frontal lobe lesions have lead to decreased inhibition as well as pressured and loquacious speech      PLAN/RECOMMENDATIONS:    stable oxygenation on room air  bedside spirometry  no indication for pulmonary medications or antibiotics  s/p bronchoscopy for BAL and endobronchial brushings and biopsy of an obstructing tumor in the left upper lobe  neurosurgery evaluation noted    being considered for neurosurgery for tumor debulking    awaiting MRI brain "official" results    no anticoagulation    prophylactic anticonvulsants -> keppra    decadron 4mg q6h po   endocrinology evaluation noted    insulin regimen being adjusted for steroid induced hyperglycemia  radiation oncology evaluation -> can be done in the outpatient setting  cardiology     ASA and plavix are currently on hold in anticipation of neurosurgery - patient had PCI last year    continue lipitor/lopressor/losartan/HCTZ    Will follow with you. Plan of care discussed with the patient and her daughter-in-law at bedside, her son by phone and with Crissy Garza and Ebonie Hopkins MD, [facility]  [phone]  Pulmonary Medicine

## 2022-07-21 NOTE — CONSULT NOTE ADULT - NS ATTEND AMEND GEN_ALL_CORE FT
Pt was seen and examined with PA, agree with note above  In brief. Ms. Phan is an 81 yo woman presenting with imaging findings suspicious for metastatic lung cancer. The dominant right temporal mass causes edema, midline shift and uncal herniation. Results of a lung biopsy are pending. She may be undergoing craniotomy for the right temporal mass. She is likely to be a candidate for RT, either to the whole brain or with SRS to the remaining lesions and the surgical bed. Systemic therapy will also likely be indicated, pending full characterization of the malignancy. A reassessment with coordination with multiple involved teams will be done. Will follow closely.
I have made amendments to the documentation where necessary. Additional comments: I have made amendments to the documentation where necessary. Additional comments: I attest my time as attending is greater than 50% of the total combined time spent on qualifying patient care activities by the PA/NP and attending.     I have made amendments to the documentation where necessary. Additional comments: Pt seen and examined, plan of care discussed with the NP, spent 30 min on the case.

## 2022-07-21 NOTE — PROGRESS NOTE ADULT - SUBJECTIVE AND OBJECTIVE BOX
Patient is a 82y old  Female who presents with a chief complaint of abnormal imaging (21 Jul 2022 14:49)    Denies HA, dizziness, cough, CP, SOB, N/V/D, abd pain, dysuria, fevers, bleeding.    Medication:   atorvastatin 40 milliGRAM(s) Oral at bedtime  chlorhexidine 2% Cloths 1 Application(s) Topical daily  dexAMETHasone     Tablet 4 milliGRAM(s) Oral every 6 hours  dextrose 5%. 1000 milliLiter(s) IV Continuous <Continuous>  dextrose 5%. 1000 milliLiter(s) IV Continuous <Continuous>  dextrose 50% Injectable 25 Gram(s) IV Push once  dextrose 50% Injectable 12.5 Gram(s) IV Push once  dextrose 50% Injectable 25 Gram(s) IV Push once  dextrose Oral Gel 15 Gram(s) Oral once PRN  gabapentin 200 milliGRAM(s) Oral at bedtime  glucagon  Injectable 1 milliGRAM(s) IntraMuscular once  hydrochlorothiazide 25 milliGRAM(s) Oral daily  insulin glargine Injectable (LANTUS) 32 Unit(s) SubCutaneous at bedtime  insulin lispro (ADMELOG) corrective regimen sliding scale   SubCutaneous three times a day before meals  insulin lispro (ADMELOG) corrective regimen sliding scale   SubCutaneous at bedtime  insulin lispro Injectable (ADMELOG) 10 Unit(s) SubCutaneous three times a day before meals  levETIRAcetam 500 milliGRAM(s) Oral two times a day  losartan 100 milliGRAM(s) Oral daily  metoprolol tartrate 50 milliGRAM(s) Oral three times a day      Physical exam    T(C): 36.8 (07-21-22 @ 13:04), Max: 36.8 (07-21-22 @ 01:33)  HR: 68 (07-21-22 @ 13:04) (53 - 68)  BP: 125/83 (07-21-22 @ 13:04) (111/64 - 129/60)  RR: 18 (07-21-22 @ 13:04) (18 - 18)  SpO2: 97% (07-21-22 @ 13:04) (94% - 97%)  Wt(kg): --    alert NAD  EOMI anicteric sclera  Neck No LNA  Cv s1 S2 RRR  Lungs clear B/L  abd soft NT ND +BS  No LE edema or tenderness    Labs                        13.6   15.12 )-----------( 417      ( 21 Jul 2022 11:13 )             41.8       07-21    138  |  100  |  38<H>  ----------------------------<  239<H>  4.1   |  23  |  0.98    Ca    9.8      21 Jul 2022 11:13  Phos  3.3     07-21  Mg     2.2     07-21            [id number]

## 2022-07-21 NOTE — PROGRESS NOTE ADULT - SUBJECTIVE AND OBJECTIVE BOX
CARDIOLOGY FOLLOW UP - Dr. Alcaraz  DATE OF SERVICE: 7/21/22     CC no cp or sob        REVIEW OF SYSTEMS:  CONSTITUTIONAL: No fever, weight loss, or fatigue  RESPIRATORY: No cough, wheezing, chills or hemoptysis; No Shortness of Breath  CARDIOVASCULAR: No chest pain, palpitations, passing out, dizziness, or leg swelling  GASTROINTESTINAL: No abdominal or epigastric pain. No nausea, vomiting, or hematemesis; No diarrhea or constipation. No melena or hematochezia.      PHYSICAL EXAM:  T(C): 36.8 (07-21-22 @ 13:04), Max: 36.8 (07-21-22 @ 01:33)  HR: 68 (07-21-22 @ 13:04) (53 - 68)  BP: 125/83 (07-21-22 @ 13:04) (111/64 - 129/60)  RR: 18 (07-21-22 @ 13:04) (18 - 18)  SpO2: 97% (07-21-22 @ 13:04) (94% - 97%)  Wt(kg): --  I&O's Summary    20 Jul 2022 07:01  -  21 Jul 2022 07:00  --------------------------------------------------------  IN: 240 mL / OUT: 0 mL / NET: 240 mL        Appearance: Normal	  Cardiovascular: Normal S1 S2,RRR, No JVD, No murmurs  Respiratory: Lungs clear to auscultation	  Gastrointestinal:  Soft, Non-tender, + BS	  Extremities: Normal range of motion, No clubbing, cyanosis or edema      Home Medications:  aspirin 81 mg oral tablet: 1 tab(s) orally once a day (19 Jul 2022 14:45)  gabapentin 100 mg oral capsule: 2 cap(s) orally once a day (at bedtime) (19 Jul 2022 14:45)  glimepiride 4 mg oral tablet: 1 tab(s) orally 2 times a day (19 Jul 2022 14:45)  losartan-hydrochlorothiazide 100 mg-25 mg oral tablet: 1 tab(s) orally once a day (19 Jul 2022 14:45)  metFORMIN 1000 mg oral tablet: 1 tab(s) orally 2 times a day   (19 Jul 2022 14:45)  metoprolol tartrate 50 mg oral tablet: 1 tab(s) orally 3 times a day (19 Jul 2022 14:45)  simvastatin 40 mg oral tablet: 1 tab(s) orally once a day (at bedtime) (19 Jul 2022 14:45)  [place] Max SoloStar 300 units/mL subcutaneous solution: 6 unit(s) subcutaneous 2 times a day as directed (19 Jul 2022 14:45)  Victoza 18 mg/3 mL subcutaneous solution: 0.6 milligram(s) subcutaneous once a day (19 Jul 2022 14:45)      MEDICATIONS  (STANDING):  atorvastatin 40 milliGRAM(s) Oral at bedtime  chlorhexidine 2% Cloths 1 Application(s) Topical daily  dexAMETHasone     Tablet 4 milliGRAM(s) Oral every 6 hours  dextrose 5%. 1000 milliLiter(s) (50 mL/Hr) IV Continuous <Continuous>  dextrose 5%. 1000 milliLiter(s) (100 mL/Hr) IV Continuous <Continuous>  dextrose 50% Injectable 25 Gram(s) IV Push once  dextrose 50% Injectable 12.5 Gram(s) IV Push once  dextrose 50% Injectable 25 Gram(s) IV Push once  gabapentin 200 milliGRAM(s) Oral at bedtime  glucagon  Injectable 1 milliGRAM(s) IntraMuscular once  hydrochlorothiazide 25 milliGRAM(s) Oral daily  insulin glargine Injectable (LANTUS) 32 Unit(s) SubCutaneous at bedtime  insulin lispro (ADMELOG) corrective regimen sliding scale   SubCutaneous three times a day before meals  insulin lispro (ADMELOG) corrective regimen sliding scale   SubCutaneous at bedtime  insulin lispro Injectable (ADMELOG) 10 Unit(s) SubCutaneous three times a day before meals  levETIRAcetam 500 milliGRAM(s) Oral two times a day  losartan 100 milliGRAM(s) Oral daily  metoprolol tartrate 50 milliGRAM(s) Oral three times a day      TELEMETRY: 	    ECG:  	  RADIOLOGY:   DIAGNOSTIC TESTING:  [ ] Echocardiogram:  [ ]  Catheterization:  [ ] Stress Test:    OTHER: 	    LABS:	 	                            13.6   15.12 )-----------( 417      ( 21 Jul 2022 11:13 )             41.8     07-21    138  |  100  |  38<H>  ----------------------------<  239<H>  4.1   |  23  |  0.98    Ca    9.8      21 Jul 2022 11:13  Phos  3.3     07-21  Mg     2.2     07-21

## 2022-07-22 LAB
CULTURE RESULTS: SIGNIFICANT CHANGE UP
GLUCOSE BLDC GLUCOMTR-MCNC: 242 MG/DL — HIGH (ref 70–99)
GLUCOSE BLDC GLUCOMTR-MCNC: 254 MG/DL — HIGH (ref 70–99)
GLUCOSE BLDC GLUCOMTR-MCNC: 381 MG/DL — HIGH (ref 70–99)
GLUCOSE BLDC GLUCOMTR-MCNC: 94 MG/DL — SIGNIFICANT CHANGE UP (ref 70–99)
PA ADP PRP-ACNC: 183 PRU — LOW (ref 194–417)
PLATELET RESPONSE ASPIRIN RESULT: 530 ARU — SIGNIFICANT CHANGE UP (ref 350–700)
SPECIMEN SOURCE: SIGNIFICANT CHANGE UP

## 2022-07-22 PROCEDURE — 99223 1ST HOSP IP/OBS HIGH 75: CPT | Mod: GC

## 2022-07-22 PROCEDURE — 93306 TTE W/DOPPLER COMPLETE: CPT | Mod: 26

## 2022-07-22 PROCEDURE — 94010 BREATHING CAPACITY TEST: CPT | Mod: 26

## 2022-07-22 RX ORDER — INSULIN GLARGINE 100 [IU]/ML
38 INJECTION, SOLUTION SUBCUTANEOUS AT BEDTIME
Refills: 0 | Status: DISCONTINUED | OUTPATIENT
Start: 2022-07-22 | End: 2022-07-23

## 2022-07-22 RX ORDER — INSULIN LISPRO 100/ML
14 VIAL (ML) SUBCUTANEOUS
Refills: 0 | Status: DISCONTINUED | OUTPATIENT
Start: 2022-07-22 | End: 2022-07-23

## 2022-07-22 RX ADMIN — Medication 10 UNIT(S): at 08:24

## 2022-07-22 RX ADMIN — Medication 3 MILLIGRAM(S): at 22:40

## 2022-07-22 RX ADMIN — Medication 14 UNIT(S): at 12:08

## 2022-07-22 RX ADMIN — GABAPENTIN 200 MILLIGRAM(S): 400 CAPSULE ORAL at 22:40

## 2022-07-22 RX ADMIN — LOSARTAN POTASSIUM 100 MILLIGRAM(S): 100 TABLET, FILM COATED ORAL at 06:14

## 2022-07-22 RX ADMIN — LEVETIRACETAM 500 MILLIGRAM(S): 250 TABLET, FILM COATED ORAL at 06:14

## 2022-07-22 RX ADMIN — CHLORHEXIDINE GLUCONATE 1 APPLICATION(S): 213 SOLUTION TOPICAL at 12:51

## 2022-07-22 RX ADMIN — Medication 4: at 12:10

## 2022-07-22 RX ADMIN — INSULIN GLARGINE 38 UNIT(S): 100 INJECTION, SOLUTION SUBCUTANEOUS at 22:40

## 2022-07-22 RX ADMIN — Medication 50 MILLIGRAM(S): at 12:54

## 2022-07-22 RX ADMIN — Medication 6: at 08:24

## 2022-07-22 RX ADMIN — Medication 6: at 22:40

## 2022-07-22 RX ADMIN — Medication 4 MILLIGRAM(S): at 17:58

## 2022-07-22 RX ADMIN — Medication 25 MILLIGRAM(S): at 06:14

## 2022-07-22 RX ADMIN — LEVETIRACETAM 500 MILLIGRAM(S): 250 TABLET, FILM COATED ORAL at 17:58

## 2022-07-22 RX ADMIN — Medication 4 MILLIGRAM(S): at 06:13

## 2022-07-22 RX ADMIN — Medication 4 MILLIGRAM(S): at 12:51

## 2022-07-22 RX ADMIN — ATORVASTATIN CALCIUM 40 MILLIGRAM(S): 80 TABLET, FILM COATED ORAL at 22:39

## 2022-07-22 NOTE — PROGRESS NOTE ADULT - SUBJECTIVE AND OBJECTIVE BOX
CARDIOLOGY FOLLOW UP - Dr. Alcaraz  DATE OF SERVICE: 7/22/22     CC no cp or sob       REVIEW OF SYSTEMS:  CONSTITUTIONAL: No fever, weight loss, or fatigue  RESPIRATORY: No cough, wheezing, chills or hemoptysis; No Shortness of Breath  CARDIOVASCULAR: No chest pain, palpitations, passing out, dizziness, or leg swelling  GASTROINTESTINAL: No abdominal or epigastric pain. No nausea, vomiting, or hematemesis; No diarrhea or constipation. No melena or hematochezia.  VASCULAR: No edema     PHYSICAL EXAM:  T(C): 36.4 (07-22-22 @ 10:04), Max: 36.8 (07-22-22 @ 00:04)  HR: 58 (07-22-22 @ 10:04) (55 - 81)  BP: 126/55 (07-22-22 @ 10:04) (116/72 - 130/56)  RR: 18 (07-22-22 @ 10:04) (18 - 18)  SpO2: 98% (07-22-22 @ 10:04) (93% - 98%)  Wt(kg): --  I&O's Summary    21 Jul 2022 07:01  -  22 Jul 2022 07:00  --------------------------------------------------------  IN: 240 mL / OUT: 0 mL / NET: 240 mL        Appearance: Normal	  Cardiovascular: Normal S1 S2,RRR, No JVD, No murmurs  Respiratory: Lungs clear to auscultation	  Gastrointestinal:  Soft, Non-tender, + BS	  Extremities: Normal range of motion, No clubbing, cyanosis or edema      Home Medications:  aspirin 81 mg oral tablet: 1 tab(s) orally once a day (19 Jul 2022 14:45)  gabapentin 100 mg oral capsule: 2 cap(s) orally once a day (at bedtime) (19 Jul 2022 14:45)  glimepiride 4 mg oral tablet: 1 tab(s) orally 2 times a day (19 Jul 2022 14:45)  losartan-hydrochlorothiazide 100 mg-25 mg oral tablet: 1 tab(s) orally once a day (19 Jul 2022 14:45)  metFORMIN 1000 mg oral tablet: 1 tab(s) orally 2 times a day   (19 Jul 2022 14:45)  metoprolol tartrate 50 mg oral tablet: 1 tab(s) orally 3 times a day (19 Jul 2022 14:45)  simvastatin 40 mg oral tablet: 1 tab(s) orally once a day (at bedtime) (19 Jul 2022 14:45)  [place] Max SoloStar 300 units/mL subcutaneous solution: 6 unit(s) subcutaneous 2 times a day as directed (19 Jul 2022 14:45)  Victoza 18 mg/3 mL subcutaneous solution: 0.6 milligram(s) subcutaneous once a day (19 Jul 2022 14:45)      MEDICATIONS  (STANDING):  atorvastatin 40 milliGRAM(s) Oral at bedtime  chlorhexidine 2% Cloths 1 Application(s) Topical daily  dexAMETHasone     Tablet 4 milliGRAM(s) Oral every 6 hours  dextrose 5%. 1000 milliLiter(s) (50 mL/Hr) IV Continuous <Continuous>  dextrose 5%. 1000 milliLiter(s) (100 mL/Hr) IV Continuous <Continuous>  dextrose 50% Injectable 25 Gram(s) IV Push once  dextrose 50% Injectable 12.5 Gram(s) IV Push once  dextrose 50% Injectable 25 Gram(s) IV Push once  gabapentin 200 milliGRAM(s) Oral at bedtime  glucagon  Injectable 1 milliGRAM(s) IntraMuscular once  hydrochlorothiazide 25 milliGRAM(s) Oral daily  insulin glargine Injectable (LANTUS) 38 Unit(s) SubCutaneous at bedtime  insulin lispro (ADMELOG) corrective regimen sliding scale   SubCutaneous three times a day before meals  insulin lispro (ADMELOG) corrective regimen sliding scale   SubCutaneous at bedtime  insulin lispro Injectable (ADMELOG) 14 Unit(s) SubCutaneous three times a day before meals  levETIRAcetam 500 milliGRAM(s) Oral two times a day  losartan 100 milliGRAM(s) Oral daily  melatonin 3 milliGRAM(s) Oral at bedtime  metoprolol tartrate 50 milliGRAM(s) Oral three times a day      TELEMETRY: 	    ECG:  	nsr pvc old inferior infarct LVH  RADIOLOGY:   DIAGNOSTIC TESTING:  [ ] Echocardiogram:  [ ]  Catheterization:  [ ] Stress Test:    OTHER: 	    LABS:	 	                            13.6   15.12 )-----------( 417      ( 21 Jul 2022 11:13 )             41.8     07-21    138  |  100  |  38<H>  ----------------------------<  239<H>  4.1   |  23  |  0.98    Ca    9.8      21 Jul 2022 11:13  Phos  3.3     07-21  Mg     2.2     07-21

## 2022-07-22 NOTE — PROGRESS NOTE ADULT - SUBJECTIVE AND OBJECTIVE BOX
DATE OF SERVICE: 07-22-22 @ 12:37    Patient is a 82y old  Female who presents with a chief complaint of abnormal imaging (22 Jul 2022 09:03)      SUBJECTIVE / OVERNIGHT EVENTS:  No chest pain. No shortness of breath. No complaints. No events overnight.     MEDICATIONS  (STANDING):  atorvastatin 40 milliGRAM(s) Oral at bedtime  chlorhexidine 2% Cloths 1 Application(s) Topical daily  dexAMETHasone     Tablet 4 milliGRAM(s) Oral every 6 hours  dextrose 5%. 1000 milliLiter(s) (50 mL/Hr) IV Continuous <Continuous>  dextrose 5%. 1000 milliLiter(s) (100 mL/Hr) IV Continuous <Continuous>  dextrose 50% Injectable 25 Gram(s) IV Push once  dextrose 50% Injectable 12.5 Gram(s) IV Push once  dextrose 50% Injectable 25 Gram(s) IV Push once  gabapentin 200 milliGRAM(s) Oral at bedtime  glucagon  Injectable 1 milliGRAM(s) IntraMuscular once  hydrochlorothiazide 25 milliGRAM(s) Oral daily  insulin glargine Injectable (LANTUS) 38 Unit(s) SubCutaneous at bedtime  insulin lispro (ADMELOG) corrective regimen sliding scale   SubCutaneous three times a day before meals  insulin lispro (ADMELOG) corrective regimen sliding scale   SubCutaneous at bedtime  insulin lispro Injectable (ADMELOG) 14 Unit(s) SubCutaneous three times a day before meals  levETIRAcetam 500 milliGRAM(s) Oral two times a day  losartan 100 milliGRAM(s) Oral daily  melatonin 3 milliGRAM(s) Oral at bedtime  metoprolol tartrate 50 milliGRAM(s) Oral three times a day    MEDICATIONS  (PRN):  dextrose Oral Gel 15 Gram(s) Oral once PRN Blood Glucose LESS THAN 70 milliGRAM(s)/deciliter      Vital Signs Last 24 Hrs  T(C): 36.4 (22 Jul 2022 10:04), Max: 36.8 (21 Jul 2022 13:04)  T(F): 97.6 (22 Jul 2022 10:04), Max: 98.3 (22 Jul 2022 00:04)  HR: 58 (22 Jul 2022 10:04) (55 - 81)  BP: 126/55 (22 Jul 2022 10:04) (116/72 - 130/56)  BP(mean): --  RR: 18 (22 Jul 2022 10:04) (18 - 18)  SpO2: 98% (22 Jul 2022 10:04) (93% - 98%)    Parameters below as of 22 Jul 2022 10:04  Patient On (Oxygen Delivery Method): room air      CAPILLARY BLOOD GLUCOSE      POCT Blood Glucose.: 242 mg/dL (22 Jul 2022 11:55)  POCT Blood Glucose.: 254 mg/dL (22 Jul 2022 08:09)  POCT Blood Glucose.: 315 mg/dL (21 Jul 2022 21:21)  POCT Blood Glucose.: 251 mg/dL (21 Jul 2022 16:43)    I&O's Summary    21 Jul 2022 07:01  -  22 Jul 2022 07:00  --------------------------------------------------------  IN: 240 mL / OUT: 0 mL / NET: 240 mL        PHYSICAL EXAM:  GENERAL: NAD, well-developed  HEAD:  Atraumatic, Normocephalic  EYES: EOMI, PERRLA, conjunctiva and sclera clear  NECK: Supple, No JVD  CHEST/LUNG: Clear to auscultation bilaterally; No wheeze  HEART: Regular rate and rhythm; No murmurs, rubs, or gallops  ABDOMEN: Soft, Nontender, Nondistended; Bowel sounds present  EXTREMITIES:  2+ Peripheral Pulses, No clubbing, cyanosis, or edema  PSYCH: AAOx3  NEUROLOGY: non-focal  SKIN: No rashes or lesions    LABS:                        13.6   15.12 )-----------( 417      ( 21 Jul 2022 11:13 )             41.8     07-21    138  |  100  |  38<H>  ----------------------------<  239<H>  4.1   |  23  |  0.98    Ca    9.8      21 Jul 2022 11:13  Phos  3.3     07-21  Mg     2.2     07-21                RADIOLOGY & ADDITIONAL TESTS:    Imaging Personally Reviewed:    Consultant(s) Notes Reviewed:      Care Discussed with Consultants/Other Providers:

## 2022-07-22 NOTE — PROGRESS NOTE ADULT - ASSESSMENT
[facility] 6/23/21:   s/p successful angioplasty to the mid RCA followed by intracoronary brachytherapy.  [facility] 4/14/21: POBA of mid RCA (80%)   [facility] 3/25/21: PCI/POBA to pCX 90%     a/p  81 y/o female (former smoker) with PMHx of HTN, CAD sp PCi,  HLD, MI, peripheral neuropathy, DM and peripheral artery disease presented with abnl imaging on MRI as outpt      #Brain/ Lung  mass   -CT head/ CT chest noted   -work up hem/onc   -neurosx fu noted MRI brain showed 2 mets R temporal 4.7x3.7x3cm L frontal 1.4x1.5x1.2cm   -s/p bronchoscopy  -Dex 4q6 per neuro sx   -pulm fu   -per neurosx -Tentatively plan for OR Monday 7/25    #CAD sp PCI   -resume ASA given PCI hx once cleared by IR -- currently SP lung Bx  -ecg with no ischemic changes   -possible plan for tumor debulking; check echo for optimization for possible sx      dvt ppx  [facility] 6/23/21:   s/p successful angioplasty to the mid RCA followed by intracoronary brachytherapy.  [facility] 4/14/21: POBA of mid RCA (80%)   [facility] 3/25/21: PCI/POBA to pCX 90%     a/p  83 y/o female (former smoker) with PMHx of HTN, CAD sp PCi,  HLD, MI, peripheral neuropathy, DM and peripheral artery disease presented with abnl imaging on MRI as outpt      #Brain/ Lung  mass   -CT head/ CT chest noted   -work up hem/onc   -neurosx fu noted MRI brain showed 2 mets R temporal 4.7x3.7x3cm L frontal 1.4x1.5x1.2cm   -s/p bronchoscopy  -Dex 4q6 per neuro sx   -pulm fu   -per neurosx -Tentatively plan for OR Monday 7/25    #CAD sp PCI   -resume ASA given PCI hx once cleared by IR/neurosx -- currently SP lung Bx  -ecg with no ischemic changes   -possible plan for tumor debulking; check echo for optimization for possible sx      dvt ppx

## 2022-07-22 NOTE — PROGRESS NOTE ADULT - ASSESSMENT
ASSESSMENT:    82 year old gentlewoman, former smoker, without history of intrinsic lung disease. She has a history of HTN, HLD, DM, CAD s/p PCI (1990s and 2021) and PAD s/p RLE stenting. The patient has been having difficulty ambulating over the last several months due to a sense of imbalance with impaired gait. Her family has noted a change in her personality with pressured speech and insomnia. Outpatient cervical MRI incidentally note a CNS lesion. Lumbar spine MRI performed due to chronic back pain revealed multilevel foraminal stenosis/disc bulges. 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 The patient has no shortness of breath or hypoxemia on room air. She has no cough, sputum production, chest congestion or wheeze. No fevers, chills or sweats. No chest pain/pressure or palpitations.     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 presenting with several months of ataxia - frontal lobe lesions have lead to decreased inhibition as well as pressured and loquacious speech      PLAN/RECOMMENDATIONS:    stable oxygenation on room air  spirometry    FEV1 - 1.81 liters - 88% predicted    FVC - 2.48 liters - 90% predicted    FEV1% - 73       c/w normal spiromatery  no indication for pulmonary medications or antibiotics  s/p bronchoscopy -> preliminary pathology -> poorly differentiated carcinoma -> special stains are pending  neurosurgery evaluation noted    being considered for neurosurgery for tumor debulking    MRI -> right temporal lobe peripherally enhancing, centrally necrotic 3.1 x 2.6 cm mass demonstrating mild diffusion restriction - there is surrounding vasogenic edema - 8 mm leftward midline shift right uncal herniation - additional left parafalcine 1.3 x 0.9 cm rim-enhancing cystic lesion with mild surrounding vasogenic edema - tiny 0.3 x 0.3 cm rim-enhancing lesion within the left frontal cortex - findings are concerning for metastatic disease - the left lateral ventricle is mildly dilated    no anticoagulation, ASA or plavix    prophylactic anticonvulsants -> keppra    decadron 4mg q6h po    tentatively scheduled for neurosurgery on Monday 7/25  endocrinology evaluation noted    insulin regimen being adjusted for steroid induced hyperglycemia  radiation oncology, neurooncology, medical oncology evaluations  cardiology     ASA and plavix are currently on hold in anticipation of neurosurgery - patient had PCI last year    continue lipitor/lopressor/losartan/HCTZ    Will follow with you. Plan of care discussed with the patient and her daughter-in-law at bedside, her son by phone and with Crissy Garza and Ebonie. There is no pulmonary contraindication to the proposed neurosurgical procedure    Satish Hopkins MD, [facility]  [phone]  Pulmonary Medicine     ASSESSMENT:    82 year old gentlewoman, former smoker, without history of intrinsic lung disease. She has a history of HTN, HLD, DM, CAD s/p PCI (1990s and 2021) and PAD s/p RLE stenting. The patient has been having difficulty ambulating over the last several months due to a sense of imbalance with impaired gait. Her family has noted a change in her personality with pressured speech and insomnia. Outpatient cervical MRI incidentally note a CNS lesion. Lumbar spine MRI performed due to chronic back pain revealed multilevel foraminal stenosis/disc bulges. 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 The patient has no shortness of breath or hypoxemia on room air. She has no cough, sputum production, chest congestion or wheeze. No fevers, chills or sweats. No chest pain/pressure or palpitations.     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 presenting with several months of ataxia - frontal lobe lesions have lead to decreased inhibition as well as pressured and loquacious speech      PLAN/RECOMMENDATIONS:    stable oxygenation on room air  spirometry    FEV1 - 1.81 liters - 88% predicted    FVC - 2.48 liters - 90% predicted    FEV1% - 73       c/w normal spiromatery  no indication for pulmonary medications or antibiotics  s/p bronchoscopy -> preliminary pathology -> poorly differentiated carcinoma -> special stains are pending  neurosurgery evaluation noted    being considered for neurosurgery for tumor debulking    MRI -> right temporal lobe peripherally enhancing, centrally necrotic 3.1 x 2.6 cm mass demonstrating mild diffusion restriction - there is surrounding vasogenic edema - 8 mm leftward midline shift right uncal herniation - additional left parafalcine 1.3 x 0.9 cm rim-enhancing cystic lesion with mild surrounding vasogenic edema - tiny 0.3 x 0.3 cm rim-enhancing lesion within the left frontal cortex - findings are concerning for metastatic disease - the left lateral ventricle is mildly dilated    no anticoagulation, ASA or plavix    prophylactic anticonvulsants -> keppra    decadron 4mg q6h po    tentatively scheduled for neurosurgery on Monday 7/25  endocrinology evaluation noted    insulin regimen being adjusted for steroid induced hyperglycemia  radiation oncology, neurooncology, medical oncology evaluations  cardiology     ASA and plavix are currently on hold in anticipation of neurosurgery - patient had PCI last year    continue lipitor/lopressor/losartan/HCTZ    ECHO - normal LVEF - no significant valvular heart disease    Will follow with you. Plan of care discussed with the patient and her daughter-in-law at bedside, her son by phone and with Crissy Garza and Ebonie. There is no pulmonary contraindication to the proposed neurosurgical procedure    Satish Hopkins MD, [facility]  [phone]  Pulmonary Medicine

## 2022-07-22 NOTE — PROGRESS NOTE ADULT - ASSESSMENT
82F mult med hx (no cancer hx) inc "stent in R leg" 2-3y ago on ASA/plavix, was called by her orthopedic surgeon, Dr. Alin Castro, telling her to come to ED 4d ago for abnormal MRI C-spine (7/14) c/f intracranial pathology. Pt waited until today to come in b/c feels fine. Reports balance/gait issues since April. CTH showing 4.8x3.2x3.2cm R temp mass and 1.4x1.3x1.6cm L frontal mass w/ vasogenic edema in mult lobes; ~8mm MLS. Also got MR L spine (7/14) form LBP radiating to legs showing multilevel foraminal stenosis/disc bulges; L5-S1 grade 1 spondy. Exam: AOx3, PERRL, EOMI, no facial, no drift, DE LA GARZA 5/5, SILT, no alfredo, no clonus.    -[facility]m [facility], q4h neurochecks  -MRI brain showed 2 mets R temporal 4.7x3.7x3cm L frontal 1.4x1.5x1.2cm   -s/p bronchoscopy ANTHONY mass 7/20, FU path  -Rad Onc, Med Onc, Neuro-onc consult  -Keppra 500bid  -Hold AC/AP  -Dex 4q6  -Tentatively plan for OR Monday 7/25  -Please document Medicine clearance

## 2022-07-22 NOTE — PROGRESS NOTE ADULT - SUBJECTIVE AND OBJECTIVE BOX
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; ataxia; frontal lobe syndrome    INTERVAL HISTORY: s/p bronchoscopy with endobronchial brushings/biopsies of an obstructing left upper lobe endobronchial lesion -> preliminary pathology c/w poorly differentiated cancer; no shortness of breath or hypoxemia on room air; no cough, sputum production, hemoptysis, chest congestion or wheeze; no fevers, chills or sweats; no chest pain/pressure or palpitations; ongoing decreased inhibition, pressured speech and ataxia    REVIEW OF SYSTEMS:  Constitutional: As per interval history  HEENT: Within normal limits  CV: As per interval history  Resp: As per interval history  GI: Within normal limits   : Within normal limits  Musculoskeletal: Within normal limits  Skin: Within normal limits  Neurological: ataxia - pressured speech  Psychiatric: Within normal limits  Endocrine: Within normal limits  Hematologic/Lymphatic: Within normal limits  Allergic/Immunologic: Within normal limits    MEDICATIONS:     Pulmonary "    Anti-microbials:    Cardiovascular:  hydrochlorothiazide 25 milliGRAM(s) Oral daily  losartan 100 milliGRAM(s) Oral daily  metoprolol tartrate 50 milliGRAM(s) Oral three times a day    Other:  atorvastatin 40 milliGRAM(s) Oral at bedtime  chlorhexidine 2% Cloths 1 Application(s) Topical daily  dexAMETHasone     Tablet 4 milliGRAM(s) Oral every 6 hours  dextrose 5%. 1000 milliLiter(s) IV Continuous <Continuous>  dextrose 5%. 1000 milliLiter(s) IV Continuous <Continuous>  dextrose 50% Injectable 25 Gram(s) IV Push once  dextrose 50% Injectable 12.5 Gram(s) IV Push once  dextrose 50% Injectable 25 Gram(s) IV Push once  gabapentin 200 milliGRAM(s) Oral at bedtime  glucagon  Injectable 1 milliGRAM(s) IntraMuscular once  insulin glargine Injectable (LANTUS) 38 Unit(s) SubCutaneous at bedtime  insulin lispro (ADMELOG) corrective regimen sliding scale   SubCutaneous three times a day before meals  insulin lispro (ADMELOG) corrective regimen sliding scale   SubCutaneous at bedtime  insulin lispro Injectable (ADMELOG) 14 Unit(s) SubCutaneous three times a day before meals  levETIRAcetam 500 milliGRAM(s) Oral two times a day  melatonin 3 milliGRAM(s) Oral at bedtime    MEDICATIONS  (PRN):  dextrose Oral Gel 15 Gram(s) Oral once PRN Blood Glucose LESS THAN 70 milliGRAM(s)/deciliter    OBJECTIVE:    POCT Blood Glucose.: 242 mg/dL (22 Jul 2022 11:55)  POCT Blood Glucose.: 254 mg/dL (22 Jul 2022 08:09)  POCT Blood Glucose.: 315 mg/dL (21 Jul 2022 21:21)  POCT Blood Glucose.: 251 mg/dL (21 Jul 2022 16:43)      PHYSICAL EXAM:       ICU Vital Signs Last 24 Hrs  T(C): 36.4 (22 Jul 2022 10:04), Max: 36.8 (22 Jul 2022 00:04)  T(F): 97.6 (22 Jul 2022 10:04), Max: 98.3 (22 Jul 2022 00:04)  HR: 58 (22 Jul 2022 10:04) (55 - 81)  BP: 126/55 (22 Jul 2022 10:04) (116/72 - 130/56)  BP(mean): --  ABP: --  ABP(mean): --  RR: 18 (22 Jul 2022 10:04) (18 - 18)  SpO2: 98% (22 Jul 2022 10:04) (93% - 98%) on room air     General: Awake. Alert. Cooperative. No distress. Appears stated age. Loquacious. Pressured speech.	  HEENT:  Atraumatic. Normocephalic. Anicteric. Normal oral mucosa. PERRL. EOMI.  Neck: Supple. Trachea midline. Thyroid without enlargement/tenderness/nodules. No carotid bruit. No JVD.	  Cardiovascular: Regular rate and rhythm. S1 S2 normal. No murmurs, rubs or gallops.  Respiratory: Respirations unlabored. Clear to auscultation and percussion bilaterally. No curvature.  Abdomen: Soft. Non-tender. Non-distended. No organomegaly. No masses. Normal bowel sounds.  Extremities: Warm to touch. No clubbing or cyanosis. No pedal edema.  Pulses: 2+ peripheral pulses all extremities.	  Skin: Normal skin color. No rashes or lesions. No ecchymoses. No cyanosis. Warm to touch.  Lymph Nodes: Cervical, supraclavicular and axillary nodes normal  Neurological: Ataxic gait. A and O x 3  Psychiatry: Appropriate mood and affect.    LABS:                          13.6   15.12 )-----------( 417      ( 21 Jul 2022 11:13 )             41.8     CBC    WBC  15.12 <==, 11.97 <==, 9.73 <==    Hemoglobin  13.6 <<==, 13.3 <<==, 13.8 <<==    Hematocrit  41.8 <==, 41.5 <==, 43.2 <==    Platelets  417 <==, 329 <==, 387 <==      138  |  100  |  38<H>  ----------------------------<  239<H>    07-21  4.1   |  23  |  0.98      LYTES    sodium  138 <==, 140 <==, 142 <==    potassium   4.1 <==, 4.7 <==, 3.8 <==    chloride  100 <==, 104 <==, 103 <==    carbon dioxide  23 <==, 22 <==, 25 <==    =============================================================================================  RENAL FUNCTION:    Creatinine:   0.98  <<==, 1.08  <<==, 0.90  <<==    BUN:   38 <==, 36 <==, 29 <==    ============================================================================================    calcium   9.8 <==, 9.8 <==, 10.3 <==    phos   3.3 <==, 3.4 <==    mag   2.2 <==, 1.5 <==    ============================================================================================  LFTs    AST:   16 <==     ALT:  9  <==     AP:  96  <=    Bili:  0.4  <=    PT/INR - ( 18 Jul 2022 21:10 )   PT: 11.8 sec;   INR: 1.03 ratio      PTT - ( 18 Jul 2022 21:10 )  PTT: 29.2 sec    MICROBIOLOGY:   COVID-19 PCR . (07.18.22 @ 23:25)   COVID-19 PCR: NotDetec    Culture - Bronchial (07.20.22 @ 16:52)   Culture Results:   Normal Respiratory Tamy present     Culture - Acid Fast - Bronchial w/Smear (07.20.22 @ 16:52)   Specimen Source: .Bronchial Bronchial Lavage   Acid Fast Bacilli Smear:   No acid fast bacilli seen by fluorochrome stain     RADIOLOGY:  [x ] Chest radiographs reviewed and interpreted by me    EXAM:  CT ABDOMEN AND PELVIS IC                        EXAM:  CT CHEST IC                          PROCEDURE DATE:  07/18/2022      FINDINGS:  CHEST:  LUNGS AND LARGE AIRWAYS: There is a heterogeneous left upper lobe mass   approximately measuring 3.3 x 2.6 cm (2, 28) obstructing the left apical   posterior bronchus with partial atelectasis of the left upper lobe. Few   scattered bilateral pulmonary nodules measuring up to 3 mm in the right   upper lobe (2, 39).  PLEURA: No pleural effusion.  VESSELS: Atherosclerotic changes of the aorta and coronary arteries.  HEART: Heart size is normal. No pericardial effusion.  MEDIASTINUM AND SIXTO: Soft tissue contiguous with mass versus adenopathy   measuring 1.7 x 1.5 cm (2, 33).  CHEST WALL AND LOWER NECK: Bilateral subcentimeter hypoattenuating   thyroid nodules.    ABDOMEN AND PELVIS:  LIVER: Within normal limits.  BILE DUCTS: Normal caliber.  GALLBLADDER: Within normal limits.  SPLEEN: Within normal limits.  PANCREAS: Within normal limits.  ADRENALS: Indeterminant left adrenal nodule measuring 1.2 cm.  KIDNEYS/URETERS: No hydronephrosis. Renal cysts and subcentimeter   hypoattenuating foci bilaterally, too small to characterize.    BLADDER: Within normal limits.  REPRODUCTIVE ORGANS: Uterus and adnexa within normal limits.    BOWEL: Colonic diverticula withoutevidence of acute diverticulitis. No   bowel obstruction. Appendix is normal.  PERITONEUM: No ascites.  VESSELS: Atherosclerotic changes.  RETROPERITONEUM/LYMPH NODES: No lymphadenopathy.  ABDOMINAL WALL: Within normal limits.  BONES: Degenerative changes. Grade 1 anterolisthesis of L5 on S1. Status   post right shoulder arthroplasty.    IMPRESSION:  Left upper lobe lung mass with partial atelectasis of the left upper   lobe, concerning for primary lung neoplasm. Few scattered sub-4 mm   pulmonary nodules, indeterminate.    Indeterminant left adrenal nodule for which contrast enhanced MRI is   recommended for further evaluation.    DEEPAK HARRINGTON MD; Resident Radiologist  This document has been electronically signed.  HAYLEY DENTON MD; Attending Radiologist  This document has been electronically signed. Jul 19 2022  9:02AM  ---------------------------------------------------------------------------------------------------------------  EXAM:  CT BRAIN                          PROCEDURE DATE:  07/18/2022      FINDINGS:  A cystic versus centrally necrotic mass in the right temporal lobe   measures approximately 4.8 x 3.2 x 3.2 cm (602:15 and 601:33).    There moderate tolarge amount of vasogenic edema in the right frontal,   parietal and temporal lobes.  There is regional mass effect in the right   cerebral hemisphere with partial effacement of the right lateral   ventricle and third ventricle.  There is medial bulging of the right   thalamus across the midline by up to 1.4 cm (2:17).  There is   approximately 5 mm subfalcine subfalcine herniation of the right frontal   lobe underneath the cerebral falx (2:21).    There is an approximately 1.4 x 1.3 x 1.6 cm cystic versus centimeter   necrotic mass in the medial left frontal lobe.  There appears to be trace   edema surrounding this lesion, without significant mass effect.    There is no evidence of acute intracranial hemorrhage, transtentorial   herniation or acute territorial infarct.  Mild prominence the left   lateral ventricle may be related to pre-existing cerebral volume loss   rather than hydrocephalus.    The paranasal sinuses and mastoids are grossly clear.    The patient is status post cataractlens placement surgery bilaterally.    The calvarium and skull base appear within normal limits.    IMPRESSION:  Cystic versus a necrotic masses measure approximately 4.8 x 3.2 x 3.2 cm   in the right temporal lobe and 1.4 x 1.3 x 1.6 cm in the medial left   frontal lobe.    Moderate to large amount of vasogenic edema in the right frontal,   parietal and temporal lobes.  Trace edema in the left frontal lobe.    Regional mass effect in the right cerebral hemisphere with partial   effacement of theventricular system and 5 mm subfalcine herniation of   the right frontal lobe underneath the cerebral falx.    The findings may represent intracranial metastatic disease versus   glioblastoma.  Contrast-enhanced brain MRI is recommended for further   evaluation.      Findings discussed with DAVID Albarran by Dr. Harrington at 2153 hours on   7/8/2022 with readback confirmation.    DEEPAK HARRINGTON MD; Resident Radiologist  This document has been electronically signed.  PHILLY CISSE MD; Attending Radiologist  This document has been electronically signed. Jul 18 2022 10:15PM  ---------------------------------------------------------------------------------------------------------------  EXAM:  MR BRAIN WAW IC                          PROCEDURE DATE:  07/20/2022      FINDINGS:    Right temporal lobe peripherally enhancing, centrally necrotic 3.1 x 2.6   cm mass demonstrating mild diffusion restriction. There is surrounding   vasogenic edema. 8 mm leftward midline shift. Right uncal herniation.   Additional left parafalcine 1.3 x 0.9 cm rim-enhancing cystic lesion with   mild surrounding vasogenic edema. Tiny 0.3 x 0.3 cm rim-enhancing lesion   within the left frontal cortex (10:125).    Scattered foci of T2/FLAIR hyperintensity in the bilateral hemispheric   white matter are nonspecific but likely related to chronic white matter   microvascular ischemic disease. The left lateral ventricle is mildly   dilated. Flow voids of the major intracranial vessels at the skull base   follow expected course and contour.    The paranasal sinuses demonstrate no signal abnormality. The mastoids   demonstrate no signal abnormality.  Status post bilateral intraocular   lens implants.      IMPRESSION:  Right temporal lobe peripherally enhancing, centrally necrotic 3.1 x 2.6   cm mass demonstrating mild diffusion restriction. There is surrounding   vasogenic edema. 8 mm leftward midline shift. Right uncal herniation.   Additional left parafalcine 1.3 x 0.9 cm rim-enhancing cystic lesion with   mild surrounding vasogenic edema. Tiny 0.3 x 0.3 cm rim-enhancing lesion   within the left frontal cortex. Findings are concerning for metastatic   disease in the context of patient's known primary lung cancer. The left   lateral ventricle is mildly dilated.    NEIL DIOP MD; Resident Radiologist  This document has been electronically signed.  NERI BARRERA MD; Attending Radiologist  This document has been electronically signed. 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; ataxia; frontal lobe syndrome    INTERVAL HISTORY: s/p bronchoscopy with endobronchial brushings/biopsies of an obstructing left upper lobe endobronchial lesion -> preliminary pathology c/w poorly differentiated cancer; no shortness of breath or hypoxemia on room air; no cough, sputum production, hemoptysis, chest congestion or wheeze; no fevers, chills or sweats; no chest pain/pressure or palpitations; ongoing decreased inhibition, pressured speech and ataxia    REVIEW OF SYSTEMS:  Constitutional: As per interval history  HEENT: Within normal limits  CV: As per interval history  Resp: As per interval history  GI: Within normal limits   : Within normal limits  Musculoskeletal: Within normal limits  Skin: Within normal limits  Neurological: ataxia - pressured speech  Psychiatric: Within normal limits  Endocrine: Within normal limits  Hematologic/Lymphatic: Within normal limits  Allergic/Immunologic: Within normal limits    MEDICATIONS:     Pulmonary "    Anti-microbials:    Cardiovascular:  hydrochlorothiazide 25 milliGRAM(s) Oral daily  losartan 100 milliGRAM(s) Oral daily  metoprolol tartrate 50 milliGRAM(s) Oral three times a day    Other:  atorvastatin 40 milliGRAM(s) Oral at bedtime  chlorhexidine 2% Cloths 1 Application(s) Topical daily  dexAMETHasone     Tablet 4 milliGRAM(s) Oral every 6 hours  dextrose 5%. 1000 milliLiter(s) IV Continuous <Continuous>  dextrose 5%. 1000 milliLiter(s) IV Continuous <Continuous>  dextrose 50% Injectable 25 Gram(s) IV Push once  dextrose 50% Injectable 12.5 Gram(s) IV Push once  dextrose 50% Injectable 25 Gram(s) IV Push once  gabapentin 200 milliGRAM(s) Oral at bedtime  glucagon  Injectable 1 milliGRAM(s) IntraMuscular once  insulin glargine Injectable (LANTUS) 38 Unit(s) SubCutaneous at bedtime  insulin lispro (ADMELOG) corrective regimen sliding scale   SubCutaneous three times a day before meals  insulin lispro (ADMELOG) corrective regimen sliding scale   SubCutaneous at bedtime  insulin lispro Injectable (ADMELOG) 14 Unit(s) SubCutaneous three times a day before meals  levETIRAcetam 500 milliGRAM(s) Oral two times a day  melatonin 3 milliGRAM(s) Oral at bedtime    MEDICATIONS  (PRN):  dextrose Oral Gel 15 Gram(s) Oral once PRN Blood Glucose LESS THAN 70 milliGRAM(s)/deciliter    OBJECTIVE:    POCT Blood Glucose.: 242 mg/dL (22 Jul 2022 11:55)  POCT Blood Glucose.: 254 mg/dL (22 Jul 2022 08:09)  POCT Blood Glucose.: 315 mg/dL (21 Jul 2022 21:21)  POCT Blood Glucose.: 251 mg/dL (21 Jul 2022 16:43)      PHYSICAL EXAM:       ICU Vital Signs Last 24 Hrs  T(C): 36.4 (22 Jul 2022 10:04), Max: 36.8 (22 Jul 2022 00:04)  T(F): 97.6 (22 Jul 2022 10:04), Max: 98.3 (22 Jul 2022 00:04)  HR: 58 (22 Jul 2022 10:04) (55 - 81)  BP: 126/55 (22 Jul 2022 10:04) (116/72 - 130/56)  BP(mean): --  ABP: --  ABP(mean): --  RR: 18 (22 Jul 2022 10:04) (18 - 18)  SpO2: 98% (22 Jul 2022 10:04) (93% - 98%) on room air     General: Awake. Alert. Cooperative. No distress. Appears stated age. Loquacious. Pressured speech.	  HEENT:  Atraumatic. Normocephalic. Anicteric. Normal oral mucosa. PERRL. EOMI.  Neck: Supple. Trachea midline. Thyroid without enlargement/tenderness/nodules. No carotid bruit. No JVD.	  Cardiovascular: Regular rate and rhythm. S1 S2 normal. No murmurs, rubs or gallops.  Respiratory: Respirations unlabored. Clear to auscultation and percussion bilaterally. No curvature.  Abdomen: Soft. Non-tender. Non-distended. No organomegaly. No masses. Normal bowel sounds.  Extremities: Warm to touch. No clubbing or cyanosis. No pedal edema.  Pulses: 2+ peripheral pulses all extremities.	  Skin: Normal skin color. No rashes or lesions. No ecchymoses. No cyanosis. Warm to touch.  Lymph Nodes: Cervical, supraclavicular and axillary nodes normal  Neurological: Ataxic gait. A and O x 3  Psychiatry: Appropriate mood and affect.    LABS:                          13.6   15.12 )-----------( 417      ( 21 Jul 2022 11:13 )             41.8     CBC    WBC  15.12 <==, 11.97 <==, 9.73 <==    Hemoglobin  13.6 <<==, 13.3 <<==, 13.8 <<==    Hematocrit  41.8 <==, 41.5 <==, 43.2 <==    Platelets  417 <==, 329 <==, 387 <==      138  |  100  |  38<H>  ----------------------------<  239<H>    07-21  4.1   |  23  |  0.98      LYTES    sodium  138 <==, 140 <==, 142 <==    potassium   4.1 <==, 4.7 <==, 3.8 <==    chloride  100 <==, 104 <==, 103 <==    carbon dioxide  23 <==, 22 <==, 25 <==    =============================================================================================  RENAL FUNCTION:    Creatinine:   0.98  <<==, 1.08  <<==, 0.90  <<==    BUN:   38 <==, 36 <==, 29 <==    ============================================================================================    calcium   9.8 <==, 9.8 <==, 10.3 <==    phos   3.3 <==, 3.4 <==    mag   2.2 <==, 1.5 <==    ============================================================================================  LFTs    AST:   16 <==     ALT:  9  <==     AP:  96  <=    Bili:  0.4  <=    PT/INR - ( 18 Jul 2022 21:10 )   PT: 11.8 sec;   INR: 1.03 ratio      PTT - ( 18 Jul 2022 21:10 )  PTT: 29.2 sec    < from: TTE with Doppler (w/Cont) (07.22.22 @ 10:09) >    Patient name: LANIE BERTRAND  YOB: 1940   Age: 82 (F)   MR#: [id number]  Study Date: 7/22/2022  Location: [address] [place], [place] [id number]: Yelena Louis DIMITRIS  Study quality: Technically difficult  Referring Physician: Mk Garza MD  BloodPressure: 116/72 mmHg  Height: 165 cm  Weight: 73 kg  BSA: 1.8 m2  ------------------------------------------------------------------------  PROCEDURE: Transthoracic echocardiogram with 2-D, M-Mode  and complete spectral and color flow Doppler.  INDICATION: Encounter for other preprocedural examination  (Z01.818)  ------------------------------------------------------------------------  Dimensions:    Normal Values:  LA:     3.4    2.0 - 4.0 cm  Ao:     3.1    2.0 - 3.8 cm  SEPTUM: 1.0    0.6 - 1.2 cm  PWT:    0.8    0.6 - 1.1 cm  LVIDd:  4.8    3.0 - 5.6 cm  LVIDs:  3.7    1.8 - 4.0 cm  Derived variables:  LVMI: 82 g/m2  RWT: 0.33  EF (Visual Estimate): 55 %  ------------------------------------------------------------------------  Observations:  Mitral Valve: Mitral annular calcification.  Aortic Valve/Aorta: Normal aortic valve.  Normal aortic root size.  Left Atrium: Normal left atrium.  Left Ventricle: Endocardial visualization enhanced with  intravenous injection of Ultrasonic Enhancing Agent  (Lumason).  Normal left ventricular internal dimensions with discrete  upper septal hypertrophy.  Estimated ejection fraction 55%, The inferolateral wall is  akinetic.  Impaired LV-relaxation with normal filling pressure.  Right Heart: Normal right atrium. Normal right ventricular  size and function.  Normal tricuspid valve.  Normal pulmonic valve. Mild pulmonic regurgitation.  Pericardium/Pleura: Normal pericardium with no pericardial  effusion.  Hemodynamic: Estimated right atrial pressure is normal.  No evidence of pulmonary hypertension.  ------------------------------------------------------------------------  Conclusions:  Endocardial visualization enhanced with intravenous  injection of Ultrasonic Enhancing Agent (Lumason).  Normal left ventricular internal dimensions with discrete  upper septal hypertrophy.  Estimated ejection fraction 55%, The inferolateral wall is  akinetic.  ------------------------------------------------------------------------  Confirmed on  7/22/2022 - 14:42:07 by David Vicente MD,  FASE  ------------------------------------------------------------------------  ---------------------------------------------------------------------------------------------------------------        MICROBIOLOGY:   COVID-19 PCR . (07.18.22 @ 23:25)   COVID-19 PCR: NotDeXerion Advanced Battery    Culture - Bronchial (07.20.22 @ 16:52)   Culture Results:   Normal Respiratory Tamy present     Culture - Acid Fast - Bronchial w/Smear (07.20.22 @ 16:52)   Specimen Source: .Bronchial Bronchial Lavage   Acid Fast Bacilli Smear:   No acid fast bacilli seen by fluorochrome stain     RADIOLOGY:  [x ] Chest radiographs reviewed and interpreted by me    EXAM:  CT ABDOMEN AND PELVIS IC                        EXAM:  CT CHEST IC                          PROCEDURE DATE:  07/18/2022      FINDINGS:  CHEST:  LUNGS AND LARGE AIRWAYS: There is a heterogeneous left upper lobe mass   approximately measuring 3.3 x 2.6 cm (2, 28) obstructing the left apical   posterior bronchus with partial atelectasis of the left upper lobe. Few   scattered bilateral pulmonary nodules measuring up to 3 mm in the right   upper lobe (2, 39).  PLEURA: No pleural effusion.  VESSELS: Atherosclerotic changes of the aorta and coronary arteries.  HEART: Heart size is normal. No pericardial effusion.  MEDIASTINUM AND SIXTO: Soft tissue contiguous with mass versus adenopathy   measuring 1.7 x 1.5 cm (2, 33).  CHEST WALL AND LOWER NECK: Bilateral subcentimeter hypoattenuating   thyroid nodules.    ABDOMEN AND PELVIS:  LIVER: Within normal limits.  BILE DUCTS: Normal caliber.  GALLBLADDER: Within normal limits.  SPLEEN: Within normal limits.  PANCREAS: Within normal limits.  ADRENALS: Indeterminant left adrenal nodule measuring 1.2 cm.  KIDNEYS/URETERS: No hydronephrosis. Renal cysts and subcentimeter   hypoattenuating foci bilaterally, too small to characterize.    BLADDER: Within normal limits.  REPRODUCTIVE ORGANS: Uterus and adnexa within normal limits.    BOWEL: Colonic diverticula withoutevidence of acute diverticulitis. No   bowel obstruction. Appendix is normal.  PERITONEUM: No ascites.  VESSELS: Atherosclerotic changes.  RETROPERITONEUM/LYMPH NODES: No lymphadenopathy.  ABDOMINAL WALL: Within normal limits.  BONES: Degenerative changes. Grade 1 anterolisthesis of L5 on S1. Status   post right shoulder arthroplasty.    IMPRESSION:  Left upper lobe lung mass with partial atelectasis of the left upper   lobe, concerning for primary lung neoplasm. Few scattered sub-4 mm   pulmonary nodules, indeterminate.    Indeterminant left adrenal nodule for which contrast enhanced MRI is   recommended for further evaluation.    DEEPAK HARRINGTON MD; Resident Radiologist  This document has been electronically signed.  HAYLEY DENTON MD; Attending Radiologist  This document has been electronically signed. Jul 19 2022  9:02AM  ---------------------------------------------------------------------------------------------------------------  EXAM:  CT BRAIN                          PROCEDURE DATE:  07/18/2022      FINDINGS:  A cystic versus centrally necrotic mass in the right temporal lobe   measures approximately 4.8 x 3.2 x 3.2 cm (602:15 and 601:33).    There moderate tolarge amount of vasogenic edema in the right frontal,   parietal and temporal lobes.  There is regional mass effect in the right   cerebral hemisphere with partial effacement of the right lateral   ventricle and third ventricle.  There is medial bulging of the right   thalamus across the midline by up to 1.4 cm (2:17).  There is   approximately 5 mm subfalcine subfalcine herniation of the right frontal   lobe underneath the cerebral falx (2:21).    There is an approximately 1.4 x 1.3 x 1.6 cm cystic versus centimeter   necrotic mass in the medial left frontal lobe.  There appears to be trace   edema surrounding this lesion, without significant mass effect.    There is no evidence of acute intracranial hemorrhage, transtentorial   herniation or acute territorial infarct.  Mild prominence the left   lateral ventricle may be related to pre-existing cerebral volume loss   rather than hydrocephalus.    The paranasal sinuses and mastoids are grossly clear.    The patient is status post cataractlens placement surgery bilaterally.    The calvarium and skull base appear within normal limits.    IMPRESSION:  Cystic versus a necrotic masses measure approximately 4.8 x 3.2 x 3.2 cm   in the right temporal lobe and 1.4 x 1.3 x 1.6 cm in the medial left   frontal lobe.    Moderate to large amount of vasogenic edema in the right frontal,   parietal and temporal lobes.  Trace edema in the left frontal lobe.    Regional mass effect in the right cerebral hemisphere with partial   effacement of theventricular system and 5 mm subfalcine herniation of   the right frontal lobe underneath the cerebral falx.    The findings may represent intracranial metastatic disease versus   glioblastoma.  Contrast-enhanced brain MRI is recommended for further   evaluation.      Findings discussed with DAVID Albarran by Dr. Harrington at 2153 hours on   7/8/2022 with readback confirmation.    DEEPAK HARRINGTON MD; Resident Radiologist  This document has been electronically signed.  PHILLY CISSE MD; Attending Radiologist  This document has been electronically signed. Jul 18 2022 10:15PM  ---------------------------------------------------------------------------------------------------------------  EXAM:  MR BRAIN WAW IC                          PROCEDURE DATE:  07/20/2022      FINDINGS:    Right temporal lobe peripherally enhancing, centrally necrotic 3.1 x 2.6   cm mass demonstrating mild diffusion restriction. There is surrounding   vasogenic edema. 8 mm leftward midline shift. Right uncal herniation.   Additional left parafalcine 1.3 x 0.9 cm rim-enhancing cystic lesion with   mild surrounding vasogenic edema. Tiny 0.3 x 0.3 cm rim-enhancing lesion   within the left frontal cortex (10:125).    Scattered foci of T2/FLAIR hyperintensity in the bilateral hemispheric   white matter are nonspecific but likely related to chronic white matter   microvascular ischemic disease. The left lateral ventricle is mildly   dilated. Flow voids of the major intracranial vessels at the skull base   follow expected course and contour.    The paranasal sinuses demonstrate no signal abnormality. The mastoids   demonstrate no signal abnormality.  Status post bilateral intraocular   lens implants.      IMPRESSION:  Right temporal lobe peripherally enhancing, centrally necrotic 3.1 x 2.6   cm mass demonstrating mild diffusion restriction. There is surrounding   vasogenic edema. 8 mm leftward midline shift. Right uncal herniation.   Additional left parafalcine 1.3 x 0.9 cm rim-enhancing cystic lesion with   mild surrounding vasogenic edema. Tiny 0.3 x 0.3 cm rim-enhancing lesion   within the left frontal cortex. Findings are concerning for metastatic   disease in the context of patient's known primary lung cancer. The left   lateral ventricle is mildly dilated.    NEIL DIOP MD; Resident Radiologist  This document has been electronically signed.  NERI BARRERA MD; Attending Radiologist  This document has been electronically signed. Jul 21 2022  4:26PM  ---------------------------------------------------------------------------------------------------------------

## 2022-07-22 NOTE — PROGRESS NOTE ADULT - ASSESSMENT
Assessment  DMT2: 82y Female with DM T2 with hyperglycemia, A1C 8.4%, was on oral meds and insulin at home, now on basal bolus insulin, increased dose yesterday, blood sugars are still running high in the setting of steroid management, no hypoglycemic episodes, eating meals, NAD, remains on dexamethasone.  Brain Mass: on meds, monitored, FU Neurosurgery.  Lung Mass: s/p bronchoscopy, CA workup in progress, monitored.  Thyroid Nodules: seen on imaging, B/L subcentimeter thyroid nodules, she denies neck mass/pain/dysphagia, euthyroid, reports known history and is being followed by Dr. Richardson.  Adrenal Nodule: seen on imaging, 1.2 cm left thyroid nodule, unknown history. AM cortisol suppressed 2/2 current steroid use, remaining labs pending..        Troy Thibodeaux MD  [phone]

## 2022-07-22 NOTE — PROGRESS NOTE ADULT - PROBLEM SELECTOR PLAN 1
Will increase Lantus to 38u at bedtime.  Will increase Admelog to 14u before each meal and continue Admelog correction scale coverage. Will continue monitoring FS and FU, will adjust dose as steroids are tapered/dc.  Patient counseled for compliance with consistent low carb diet and exercise as tolerated outpatient.

## 2022-07-22 NOTE — PROGRESS NOTE ADULT - ASSESSMENT
83 y/o female (former smoker) with PMHx of HTN, CAD, HLD, MI, peripheral neuropathy, DM and peripheral artery disease presented to the ED for imaging for her head due to abnormal spine MRI by her orthopedist (Dr. Castro) during workup for her spine for her chronic back problems. The orthopedist noted something in her brain. Otherwise pt has no symptoms, stated that since 04/2022 she has had gait/balance issues. Endorsed that if she sits for a while and gets up, she feels like she is "drunk" and feels off. Denied chest pain, SOB, fevers, chills, numbness/paresthesias, muscular weakness, dizziness, abdominal pain, headaches.    brain mass  - likely metastatic lung cancer  - await mri  - decadron  - keppra  - seen by NS  - for poss resection tentatively scheduled for Monday    lung mass  - pulm following  - bronch was done  - follow up path    diabetes  - hold oral hypoglycemics  - insulin ss  - hgb a1c  - elevated sugars are expected  - endo consult following    HTN  -  cont home meds    cad  - s/p stent  - hold asa and plavix for poss brain surgery    dvt px  - scd's

## 2022-07-22 NOTE — PROGRESS NOTE ADULT - SUBJECTIVE AND OBJECTIVE BOX
Patient seen and examined at bedside.    --Anticoagulation--    T(C): 36.8 (07-22-22 @ 00:04), Max: 36.8 (07-21-22 @ 13:04)  HR: 55 (07-22-22 @ 06:14) (55 - 81)  BP: 116/72 (07-22-22 @ 06:14) (116/72 - 130/56)  RR: 18 (07-22-22 @ 00:04) (18 - 18)  SpO2: 93% (07-22-22 @ 00:04) (93% - 97%)  Wt(kg): --    Exam: AOx3, PERRL, EOMI, no drift, no facial droop, DE LA GARZA 5/5, SILT

## 2022-07-22 NOTE — PROGRESS NOTE ADULT - SUBJECTIVE AND OBJECTIVE BOX
Chief complaint  Patient is a 82y old  Female who presents with a chief complaint of abnormal imaging (22 Jul 2022 12:37)   Review of systems  Patient in bed, looks comfortable, no hypoglycemic episodes.    Labs and Fingersticks  CAPILLARY BLOOD GLUCOSE      POCT Blood Glucose.: 242 mg/dL (22 Jul 2022 11:55)  POCT Blood Glucose.: 254 mg/dL (22 Jul 2022 08:09)  POCT Blood Glucose.: 315 mg/dL (21 Jul 2022 21:21)  POCT Blood Glucose.: 251 mg/dL (21 Jul 2022 16:43)      Anion Gap, Serum: 15 (07-21 @ 11:13)      Calcium, Total Serum: 9.8 (07-21 @ 11:13)          07-21    138  |  100  |  38<H>  ----------------------------<  239<H>  4.1   |  23  |  0.98    Ca    9.8      21 Jul 2022 11:13  Phos  3.3     07-21  Mg     2.2     07-21                          13.6   15.12 )-----------( 417      ( 21 Jul 2022 11:13 )             41.8     Medications  MEDICATIONS  (STANDING):  atorvastatin 40 milliGRAM(s) Oral at bedtime  chlorhexidine 2% Cloths 1 Application(s) Topical daily  dexAMETHasone     Tablet 4 milliGRAM(s) Oral every 6 hours  dextrose 5%. 1000 milliLiter(s) (50 mL/Hr) IV Continuous <Continuous>  dextrose 5%. 1000 milliLiter(s) (100 mL/Hr) IV Continuous <Continuous>  dextrose 50% Injectable 25 Gram(s) IV Push once  dextrose 50% Injectable 12.5 Gram(s) IV Push once  dextrose 50% Injectable 25 Gram(s) IV Push once  gabapentin 200 milliGRAM(s) Oral at bedtime  glucagon  Injectable 1 milliGRAM(s) IntraMuscular once  hydrochlorothiazide 25 milliGRAM(s) Oral daily  insulin glargine Injectable (LANTUS) 38 Unit(s) SubCutaneous at bedtime  insulin lispro (ADMELOG) corrective regimen sliding scale   SubCutaneous three times a day before meals  insulin lispro (ADMELOG) corrective regimen sliding scale   SubCutaneous at bedtime  insulin lispro Injectable (ADMELOG) 14 Unit(s) SubCutaneous three times a day before meals  levETIRAcetam 500 milliGRAM(s) Oral two times a day  losartan 100 milliGRAM(s) Oral daily  melatonin 3 milliGRAM(s) Oral at bedtime  metoprolol tartrate 50 milliGRAM(s) Oral three times a day      Physical Exam  General: Patient comfortable in bed  Vital Signs Last 12 Hrs  T(F): 97.6 (07-22-22 @ 10:04), Max: 97.6 (07-22-22 @ 10:04)  HR: 58 (07-22-22 @ 10:04) (55 - 58)  BP: 126/55 (07-22-22 @ 10:04) (116/72 - 126/55)  BP(mean): --  RR: 18 (07-22-22 @ 10:04) (18 - 18)  SpO2: 98% (07-22-22 @ 10:04) (98% - 98%)  Neck: No palpable thyroid nodules.  CVS: S1S2, No murmurs  Respiratory: No wheezing, no crepitations  GI: Abdomen soft, bowel sounds positive  Musculoskeletal:  edema lower extremities.   Skin: No skin rashes, no ecchymosis    Diagnostics    Metanephrine, Plasma: AM Sched. Collection: 21-Jul-2022 06:00 (07-20 @ 10:54)  Aldosterone, Serum: AM Sched. Collection: 21-Jul-2022 06:00 (07-20 @ 10:54)  Cortisol AM, Serum: AM Sched. Collection: 21-Jul-2022 06:00 (07-20 @ 10:54)

## 2022-07-23 LAB
GLUCOSE BLDC GLUCOMTR-MCNC: 122 MG/DL — HIGH (ref 70–99)
GLUCOSE BLDC GLUCOMTR-MCNC: 147 MG/DL — HIGH (ref 70–99)
GLUCOSE BLDC GLUCOMTR-MCNC: 171 MG/DL — HIGH (ref 70–99)
GLUCOSE BLDC GLUCOMTR-MCNC: 176 MG/DL — HIGH (ref 70–99)
GLUCOSE BLDC GLUCOMTR-MCNC: 200 MG/DL — HIGH (ref 70–99)

## 2022-07-23 RX ORDER — INSULIN GLARGINE 100 [IU]/ML
42 INJECTION, SOLUTION SUBCUTANEOUS AT BEDTIME
Refills: 0 | Status: DISCONTINUED | OUTPATIENT
Start: 2022-07-23 | End: 2022-07-24

## 2022-07-23 RX ORDER — INSULIN LISPRO 100/ML
16 VIAL (ML) SUBCUTANEOUS
Refills: 0 | Status: DISCONTINUED | OUTPATIENT
Start: 2022-07-23 | End: 2022-07-27

## 2022-07-23 RX ORDER — INSULIN LISPRO 100/ML
12 VIAL (ML) SUBCUTANEOUS
Refills: 0 | Status: DISCONTINUED | OUTPATIENT
Start: 2022-07-23 | End: 2022-07-27

## 2022-07-23 RX ADMIN — ATORVASTATIN CALCIUM 40 MILLIGRAM(S): 80 TABLET, FILM COATED ORAL at 21:10

## 2022-07-23 RX ADMIN — LEVETIRACETAM 500 MILLIGRAM(S): 250 TABLET, FILM COATED ORAL at 06:34

## 2022-07-23 RX ADMIN — Medication 3 MILLIGRAM(S): at 21:10

## 2022-07-23 RX ADMIN — Medication 50 MILLIGRAM(S): at 14:18

## 2022-07-23 RX ADMIN — INSULIN GLARGINE 42 UNIT(S): 100 INJECTION, SOLUTION SUBCUTANEOUS at 21:42

## 2022-07-23 RX ADMIN — Medication 2: at 08:15

## 2022-07-23 RX ADMIN — LOSARTAN POTASSIUM 100 MILLIGRAM(S): 100 TABLET, FILM COATED ORAL at 06:33

## 2022-07-23 RX ADMIN — LEVETIRACETAM 500 MILLIGRAM(S): 250 TABLET, FILM COATED ORAL at 17:29

## 2022-07-23 RX ADMIN — CHLORHEXIDINE GLUCONATE 1 APPLICATION(S): 213 SOLUTION TOPICAL at 13:15

## 2022-07-23 RX ADMIN — Medication 14 UNIT(S): at 08:14

## 2022-07-23 RX ADMIN — Medication 4 MILLIGRAM(S): at 17:29

## 2022-07-23 RX ADMIN — Medication 4 MILLIGRAM(S): at 13:20

## 2022-07-23 RX ADMIN — Medication 4 MILLIGRAM(S): at 00:38

## 2022-07-23 RX ADMIN — Medication 25 MILLIGRAM(S): at 06:34

## 2022-07-23 RX ADMIN — Medication 50 MILLIGRAM(S): at 06:33

## 2022-07-23 RX ADMIN — Medication 12 UNIT(S): at 13:07

## 2022-07-23 RX ADMIN — Medication 2: at 17:24

## 2022-07-23 RX ADMIN — Medication 4 MILLIGRAM(S): at 06:33

## 2022-07-23 RX ADMIN — GABAPENTIN 200 MILLIGRAM(S): 400 CAPSULE ORAL at 21:10

## 2022-07-23 RX ADMIN — Medication 16 UNIT(S): at 17:28

## 2022-07-23 NOTE — PROGRESS NOTE ADULT - ASSESSMENT
Assessment  DMT2: 82y Female with DM T2 with hyperglycemia, A1C 8.4%, was on oral meds and insulin at home, now on basal bolus insulin, increased dose yesterday, blood sugars are still running high in the setting of steroid management, no hypoglycemic episodes, eating meals, NAD, remains on dexamethasone.  Brain Mass: on meds, monitored, FU Neurosurgery.  Lung Mass: s/p bronchoscopy, CA workup in progress, monitored.  Thyroid Nodules: seen on imaging, B/L subcentimeter thyroid nodules, she denies neck mass/pain/dysphagia, euthyroid, reports known history and is being followed by Dr. Richardson.  Adrenal Nodule: seen on imaging, 1.2 cm left thyroid nodule, unknown history. AM cortisol suppressed 2/2 current steroid use, remaining labs pending..        Troy Thibodeaux MD  [phone]

## 2022-07-23 NOTE — PROGRESS NOTE ADULT - SUBJECTIVE AND OBJECTIVE BOX
Patient seen and examined at bedside.    --Anticoagulation--    T(C): 36.6 (07-23-22 @ 10:18), Max: 36.9 (07-22-22 @ 16:18)  HR: 58 (07-23-22 @ 10:18) (57 - 58)  BP: 136/65 (07-23-22 @ 10:18) (109/86 - 136/65)  RR: 18 (07-23-22 @ 10:18) (18 - 18)  SpO2: 98% (07-23-22 @ 10:18) (97% - 98%)  Wt(kg): --    Exam: AOx3, PERRL, EOMI, no drift, no facial droop, DE LA GARZA 5/5, SILT

## 2022-07-23 NOTE — PROGRESS NOTE ADULT - SUBJECTIVE AND OBJECTIVE BOX
Endocrinology Attending Covering for Dr. Tovar      Chief complaint  Patient is a 82y old  Female who presents with a chief complaint of abnormal imaging (23 Jul 2022 09:04)   Review of systems  Patient in bed, looks comfortable, no fever,  had no hypoglycemia.    Labs and Fingersticks  CAPILLARY BLOOD GLUCOSE      POCT Blood Glucose.: 200 mg/dL (23 Jul 2022 08:01)  POCT Blood Glucose.: 381 mg/dL (22 Jul 2022 22:16)  POCT Blood Glucose.: 94 mg/dL (22 Jul 2022 16:59)  POCT Blood Glucose.: 242 mg/dL (22 Jul 2022 11:55)      Anion Gap, Serum: 15 (07-21 @ 11:13)      Calcium, Total Serum: 9.8 (07-21 @ 11:13)          07-21    138  |  100  |  38<H>  ----------------------------<  239<H>  4.1   |  23  |  0.98    Ca    9.8      21 Jul 2022 11:13  Phos  3.3     07-21  Mg     2.2     07-21                          13.6   15.12 )-----------( 417      ( 21 Jul 2022 11:13 )             41.8     Medications  MEDICATIONS  (STANDING):  atorvastatin 40 milliGRAM(s) Oral at bedtime  chlorhexidine 2% Cloths 1 Application(s) Topical daily  dexAMETHasone     Tablet 4 milliGRAM(s) Oral every 6 hours  dextrose 5%. 1000 milliLiter(s) (100 mL/Hr) IV Continuous <Continuous>  dextrose 5%. 1000 milliLiter(s) (50 mL/Hr) IV Continuous <Continuous>  dextrose 50% Injectable 25 Gram(s) IV Push once  dextrose 50% Injectable 12.5 Gram(s) IV Push once  dextrose 50% Injectable 25 Gram(s) IV Push once  gabapentin 200 milliGRAM(s) Oral at bedtime  glucagon  Injectable 1 milliGRAM(s) IntraMuscular once  hydrochlorothiazide 25 milliGRAM(s) Oral daily  insulin glargine Injectable (LANTUS) 38 Unit(s) SubCutaneous at bedtime  insulin lispro (ADMELOG) corrective regimen sliding scale   SubCutaneous three times a day before meals  insulin lispro (ADMELOG) corrective regimen sliding scale   SubCutaneous at bedtime  insulin lispro Injectable (ADMELOG) 14 Unit(s) SubCutaneous three times a day before meals  levETIRAcetam 500 milliGRAM(s) Oral two times a day  losartan 100 milliGRAM(s) Oral daily  melatonin 3 milliGRAM(s) Oral at bedtime  metoprolol tartrate 50 milliGRAM(s) Oral three times a day      Physical Exam  General: Patient comfortable in bed  Vital Signs Last 12 Hrs  T(F): --  HR: --  BP: --  BP(mean): --  RR: --  SpO2: --  Neck: No palpable thyroid nodules.  CVS: S1S2, No murmurs  Respiratory: No wheezing, no crepitations  GI: Abdomen soft, bowel sounds positive  Musculoskeletal:  edema lower extremities.   Skin: No skin rashes, no ecchymosis    Diagnostics

## 2022-07-23 NOTE — PROGRESS NOTE ADULT - SUBJECTIVE AND OBJECTIVE BOX
CARDIOLOGY FOLLOW UP - Dr. Alcaraz  Date of Service: 7/23/2022  CC: no cp/sob    Review of Systems:  Constitutional: No fever, weight loss, or fatigue  Respiratory: No cough, wheezing, or hemoptysis, no shortness of breath  Cardiovascular: No chest pain, palpitations, passing out, dizziness, or leg swelling  Gastrointestinal: No abd or epigastric pain. No nausea, vomiting, or hematemesis; no diarrhea or consiptaiton, no melena or hematochezia  Vascular: No edema     TELEMETRY:    PHYSICAL EXAM:  T(C): 36.9 (07-22-22 @ 16:18), Max: 36.9 (07-22-22 @ 16:18)  HR: 57 (07-22-22 @ 16:18) (57 - 58)  BP: 109/86 (07-22-22 @ 16:18) (109/86 - 126/55)  RR: 18 (07-22-22 @ 16:18) (18 - 18)  SpO2: 97% (07-22-22 @ 16:18) (97% - 98%)  Wt(kg): --  I&O's Summary    22 Jul 2022 07:01  -  23 Jul 2022 07:00  --------------------------------------------------------  IN: 240 mL / OUT: 0 mL / NET: 240 mL        Appearance: Normal	  Cardiovascular: Normal S1 S2,RRR, No JVD, No murmurs  Respiratory: Lungs clear to auscultation	  Gastrointestinal:  Soft, Non-tender, + BS	  Extremities: Normal range of motion, No clubbing, cyanosis or edema  Vascular: Peripheral pulses palpable 2+ bilaterally       Home Medications:  aspirin 81 mg oral tablet: 1 tab(s) orally once a day (19 Jul 2022 14:45)  gabapentin 100 mg oral capsule: 2 cap(s) orally once a day (at bedtime) (19 Jul 2022 14:45)  glimepiride 4 mg oral tablet: 1 tab(s) orally 2 times a day (19 Jul 2022 14:45)  losartan-hydrochlorothiazide 100 mg-25 mg oral tablet: 1 tab(s) orally once a day (19 Jul 2022 14:45)  metFORMIN 1000 mg oral tablet: 1 tab(s) orally 2 times a day   (19 Jul 2022 14:45)  metoprolol tartrate 50 mg oral tablet: 1 tab(s) orally 3 times a day (19 Jul 2022 14:45)  simvastatin 40 mg oral tablet: 1 tab(s) orally once a day (at bedtime) (19 Jul 2022 14:45)  Toujeri Max SoloStar 300 units/mL subcutaneous solution: 6 unit(s) subcutaneous 2 times a day as directed (19 Jul 2022 14:45)  Victoza 18 mg/3 mL subcutaneous solution: 0.6 milligram(s) subcutaneous once a day (19 Jul 2022 14:45)        MEDICATIONS  (STANDING):  atorvastatin 40 milliGRAM(s) Oral at bedtime  chlorhexidine 2% Cloths 1 Application(s) Topical daily  dexAMETHasone     Tablet 4 milliGRAM(s) Oral every 6 hours  dextrose 5%. 1000 milliLiter(s) (100 mL/Hr) IV Continuous <Continuous>  dextrose 5%. 1000 milliLiter(s) (50 mL/Hr) IV Continuous <Continuous>  dextrose 50% Injectable 25 Gram(s) IV Push once  dextrose 50% Injectable 12.5 Gram(s) IV Push once  dextrose 50% Injectable 25 Gram(s) IV Push once  gabapentin 200 milliGRAM(s) Oral at bedtime  glucagon  Injectable 1 milliGRAM(s) IntraMuscular once  hydrochlorothiazide 25 milliGRAM(s) Oral daily  insulin glargine Injectable (LANTUS) 38 Unit(s) SubCutaneous at bedtime  insulin lispro (ADMELOG) corrective regimen sliding scale   SubCutaneous three times a day before meals  insulin lispro (ADMELOG) corrective regimen sliding scale   SubCutaneous at bedtime  insulin lispro Injectable (ADMELOG) 14 Unit(s) SubCutaneous three times a day before meals  levETIRAcetam 500 milliGRAM(s) Oral two times a day  losartan 100 milliGRAM(s) Oral daily  melatonin 3 milliGRAM(s) Oral at bedtime  metoprolol tartrate 50 milliGRAM(s) Oral three times a day        EKG:  RADIOLOGY:  DIAGNOSTIC TESTING:  [ ] Echocardiogram:  [ ] Catherterization:  [ ] Stress Test:  OTHER:     LABS:	 	                          13.6   15.12 )-----------( 417      ( 21 Jul 2022 11:13 )             41.8     07-21    138  |  100  |  38<H>  ----------------------------<  239<H>  4.1   |  23  |  0.98    Ca    9.8      21 Jul 2022 11:13  Phos  3.3     07-21  Mg     2.2     07-21            CARDIAC MARKERS:

## 2022-07-23 NOTE — PROGRESS NOTE ADULT - ASSESSMENT
82F mult med hx (no cancer hx) inc "stent in R leg" 2-3y ago on ASA/plavix, was called by her orthopedic surgeon, Dr. Alin Castro, telling her to come to ED 4d ago for abnormal MRI C-spine (7/14) c/f intracranial pathology. Pt waited until today to come in b/c feels fine. Reports balance/gait issues since April. CTH showing 4.8x3.2x3.2cm R temp mass and 1.4x1.3x1.6cm L frontal mass w/ vasogenic edema in mult lobes; ~8mm MLS. Also got MR L spine (7/14) form LBP radiating to legs showing multilevel foraminal stenosis/disc bulges; L5-S1 grade 1 spondy. Exam: AOx3, PERRL, EOMI, no facial, no drift, DE LA GARZA 5/5, SILT, no alfredo, no clonus.  -Adm Medicine, q4h neurochecks  -MRI brain showed 2 mets R temporal 4.7x3.7x3cm L frontal 1.4x1.5x1.2cm   -Pending bronchoscopy ANTHONY mass 7/20, pending final Path results  -Rad Onc, Med Onc, Neuro-onc consult  -Keppra 500bid  -Hold AC/AP  -Dex 4q6  -Tentatively plan for OR Monday 7/25  -TTE done, please document Medicine and Cardiac clearance

## 2022-07-23 NOTE — PROGRESS NOTE ADULT - SUBJECTIVE AND OBJECTIVE BOX
DATE OF SERVICE: 07-23-22 @ 08:48    Patient is a 82y old  Female who presents with a chief complaint of abnormal imaging (22 Jul 2022 13:57)      SUBJECTIVE / OVERNIGHT EVENTS:  No chest pain. No shortness of breath. No complaints. No events overnight.     MEDICATIONS  (STANDING):  atorvastatin 40 milliGRAM(s) Oral at bedtime  chlorhexidine 2% Cloths 1 Application(s) Topical daily  dexAMETHasone     Tablet 4 milliGRAM(s) Oral every 6 hours  dextrose 5%. 1000 milliLiter(s) (100 mL/Hr) IV Continuous <Continuous>  dextrose 5%. 1000 milliLiter(s) (50 mL/Hr) IV Continuous <Continuous>  dextrose 50% Injectable 25 Gram(s) IV Push once  dextrose 50% Injectable 12.5 Gram(s) IV Push once  dextrose 50% Injectable 25 Gram(s) IV Push once  gabapentin 200 milliGRAM(s) Oral at bedtime  glucagon  Injectable 1 milliGRAM(s) IntraMuscular once  hydrochlorothiazide 25 milliGRAM(s) Oral daily  insulin glargine Injectable (LANTUS) 38 Unit(s) SubCutaneous at bedtime  insulin lispro (ADMELOG) corrective regimen sliding scale   SubCutaneous at bedtime  insulin lispro (ADMELOG) corrective regimen sliding scale   SubCutaneous three times a day before meals  insulin lispro Injectable (ADMELOG) 14 Unit(s) SubCutaneous three times a day before meals  levETIRAcetam 500 milliGRAM(s) Oral two times a day  losartan 100 milliGRAM(s) Oral daily  melatonin 3 milliGRAM(s) Oral at bedtime  metoprolol tartrate 50 milliGRAM(s) Oral three times a day    MEDICATIONS  (PRN):  dextrose Oral Gel 15 Gram(s) Oral once PRN Blood Glucose LESS THAN 70 milliGRAM(s)/deciliter      Vital Signs Last 24 Hrs  T(C): 36.9 (22 Jul 2022 16:18), Max: 36.9 (22 Jul 2022 16:18)  T(F): 98.4 (22 Jul 2022 16:18), Max: 98.4 (22 Jul 2022 16:18)  HR: 57 (22 Jul 2022 16:18) (57 - 58)  BP: 109/86 (22 Jul 2022 16:18) (109/86 - 126/55)  BP(mean): --  RR: 18 (22 Jul 2022 16:18) (18 - 18)  SpO2: 97% (22 Jul 2022 16:18) (97% - 98%)    Parameters below as of 22 Jul 2022 16:18  Patient On (Oxygen Delivery Method): room air      CAPILLARY BLOOD GLUCOSE      POCT Blood Glucose.: 200 mg/dL (23 Jul 2022 08:01)  POCT Blood Glucose.: 381 mg/dL (22 Jul 2022 22:16)  POCT Blood Glucose.: 94 mg/dL (22 Jul 2022 16:59)  POCT Blood Glucose.: 242 mg/dL (22 Jul 2022 11:55)    I&O's Summary    22 Jul 2022 07:01  -  23 Jul 2022 07:00  --------------------------------------------------------  IN: 240 mL / OUT: 0 mL / NET: 240 mL        PHYSICAL EXAM:  GENERAL: NAD, well-developed  HEAD:  Atraumatic, Normocephalic  EYES: EOMI, PERRLA, conjunctiva and sclera clear  NECK: Supple, No JVD  CHEST/LUNG: Clear to auscultation bilaterally; No wheeze  HEART: Regular rate and rhythm; No murmurs, rubs, or gallops  ABDOMEN: Soft, Nontender, Nondistended; Bowel sounds present  EXTREMITIES:  2+ Peripheral Pulses, No clubbing, cyanosis, or edema  PSYCH: AAOx3  NEUROLOGY: non-focal  SKIN: No rashes or lesions    LABS:                        13.6   15.12 )-----------( 417      ( 21 Jul 2022 11:13 )             41.8     07-21    138  |  100  |  38<H>  ----------------------------<  239<H>  4.1   |  23  |  0.98    Ca    9.8      21 Jul 2022 11:13  Phos  3.3     07-21  Mg     2.2     07-21                RADIOLOGY & ADDITIONAL TESTS:    Imaging Personally Reviewed:    Consultant(s) Notes Reviewed:      Care Discussed with Consultants/Other Providers:

## 2022-07-23 NOTE — PROGRESS NOTE ADULT - PROBLEM SELECTOR PLAN 1
Will increase Lantus to 42u at bedtime.  Will increase Admelog to 16u before breakfast and dinner, and 12 units pre-lunch( since at 4 pm fs was 94) and continue Admelog correction scale coverage. Will continue monitoring FS and FU, will adjust dose as steroids are tapered/dc.  Patient counseled for compliance with consistent low carb diet and exercise as tolerated outpatient.

## 2022-07-23 NOTE — PROGRESS NOTE ADULT - ASSESSMENT
83 y/o female (former smoker) with PMHx of HTN, CAD, HLD, MI, peripheral neuropathy, DM and peripheral artery disease presented to the ED for imaging for her head due to abnormal spine MRI by her orthopedist (Dr. Castro) during workup for her spine for her chronic back problems. The orthopedist noted something in her brain. Otherwise pt has no symptoms, stated that since 04/2022 she has had gait/balance issues. Endorsed that if she sits for a while and gets up, she feels like she is "drunk" and feels off. Denied chest pain, SOB, fevers, chills, numbness/paresthesias, muscular weakness, dizziness, abdominal pain, headaches.    brain mass  - likely metastatic lung cancer  - await mri  - decadron  - keppra  - seen by NS  - for poss resection tentatively scheduled for Monday    lung mass  - pulm following  - bronch was done  - follow up path    diabetes  - hold oral hypoglycemics  - insulin ss  - hgb a1c  8.7   - elevated sugars are expected  - endo consult following    HTN  -  cont home meds    cad  - s/p stent  - hold asa and plavix for poss brain surgery    dvt px  - scd's

## 2022-07-23 NOTE — PROGRESS NOTE ADULT - ASSESSMENT
[facility] 6/23/21:   s/p successful angioplasty to the mid RCA followed by intracoronary brachytherapy.  [facility] 4/14/21: POBA of mid RCA (80%)   [facility] 3/25/21: PCI/POBA to pCX 90%     a/p  83 y/o female (former smoker) with PMHx of HTN, CAD sp PCi,  HLD, MI, peripheral neuropathy, DM and peripheral artery disease presented with abnl imaging on MRI as outpt      #Brain/ Lung  mass   -CT head/ CT chest noted   -work up hem/onc   -neurosx fu noted MRI brain showed 2 mets R temporal 4.7x3.7x3cm L frontal 1.4x1.5x1.2cm   -s/p bronchoscopy  -Dex 4q6 per neuro sx   -pulm fu   -per neurosx -Tentatively plan for OR Monday 7/25    #CAD sp PCI   -resume ASA given PCI hx once cleared by IR/neurosx -- currently SP lung Bx  -ecg with no ischemic changes   -possible plan for tumor debulking; check echo for optimization for possible sx      dvt ppx     35 minutes spent on total encounter; more than 50% of the visit was spent counseling and/or coordinating care by the attending physician.

## 2022-07-24 LAB
ANION GAP SERPL CALC-SCNC: 14 MMOL/L — SIGNIFICANT CHANGE UP (ref 5–17)
APTT BLD: 25.7 SEC — LOW (ref 27.5–35.5)
BLD GP AB SCN SERPL QL: NEGATIVE — SIGNIFICANT CHANGE UP
BUN SERPL-MCNC: 38 MG/DL — HIGH (ref 7–23)
CALCIUM SERPL-MCNC: 9.6 MG/DL — SIGNIFICANT CHANGE UP (ref 8.4–10.5)
CHLORIDE SERPL-SCNC: 102 MMOL/L — SIGNIFICANT CHANGE UP (ref 96–108)
CO2 SERPL-SCNC: 26 MMOL/L — SIGNIFICANT CHANGE UP (ref 22–31)
CREAT SERPL-MCNC: 1.08 MG/DL — SIGNIFICANT CHANGE UP (ref 0.5–1.3)
EGFR: 51 ML/MIN/1.73M2 — LOW
GLUCOSE BLDC GLUCOMTR-MCNC: 108 MG/DL — HIGH (ref 70–99)
GLUCOSE BLDC GLUCOMTR-MCNC: 122 MG/DL — HIGH (ref 70–99)
GLUCOSE BLDC GLUCOMTR-MCNC: 154 MG/DL — HIGH (ref 70–99)
GLUCOSE BLDC GLUCOMTR-MCNC: 99 MG/DL — SIGNIFICANT CHANGE UP (ref 70–99)
GLUCOSE SERPL-MCNC: 99 MG/DL — SIGNIFICANT CHANGE UP (ref 70–99)
HCT VFR BLD CALC: 47.1 % — HIGH (ref 34.5–45)
HGB BLD-MCNC: 15.2 G/DL — SIGNIFICANT CHANGE UP (ref 11.5–15.5)
INR BLD: 0.87 RATIO — LOW (ref 0.88–1.16)
MCHC RBC-ENTMCNC: 30.6 PG — SIGNIFICANT CHANGE UP (ref 27–34)
MCHC RBC-ENTMCNC: 32.3 GM/DL — SIGNIFICANT CHANGE UP (ref 32–36)
MCV RBC AUTO: 94.8 FL — SIGNIFICANT CHANGE UP (ref 80–100)
MRSA PCR RESULT.: SIGNIFICANT CHANGE UP
NRBC # BLD: 0 /100 WBCS — SIGNIFICANT CHANGE UP (ref 0–0)
PA ADP PRP-ACNC: 190 PRU — LOW (ref 194–417)
PLATELET # BLD AUTO: 374 K/UL — SIGNIFICANT CHANGE UP (ref 150–400)
PLATELET RESPONSE ASPIRIN RESULT: 647 ARU — SIGNIFICANT CHANGE UP (ref 350–700)
POTASSIUM SERPL-MCNC: 3.3 MMOL/L — LOW (ref 3.5–5.3)
POTASSIUM SERPL-SCNC: 3.3 MMOL/L — LOW (ref 3.5–5.3)
PROTHROM AB SERPL-ACNC: 10.1 SEC — LOW (ref 10.5–13.4)
RBC # BLD: 4.97 M/UL — SIGNIFICANT CHANGE UP (ref 3.8–5.2)
RBC # FLD: 12.3 % — SIGNIFICANT CHANGE UP (ref 10.3–14.5)
RH IG SCN BLD-IMP: POSITIVE — SIGNIFICANT CHANGE UP
S AUREUS DNA NOSE QL NAA+PROBE: SIGNIFICANT CHANGE UP
SARS-COV-2 RNA SPEC QL NAA+PROBE: SIGNIFICANT CHANGE UP
SODIUM SERPL-SCNC: 142 MMOL/L — SIGNIFICANT CHANGE UP (ref 135–145)
WBC # BLD: 14.95 K/UL — HIGH (ref 3.8–10.5)
WBC # FLD AUTO: 14.95 K/UL — HIGH (ref 3.8–10.5)

## 2022-07-24 PROCEDURE — 93970 EXTREMITY STUDY: CPT | Mod: 26

## 2022-07-24 RX ORDER — CHLORHEXIDINE GLUCONATE 213 G/1000ML
1 SOLUTION TOPICAL ONCE
Refills: 0 | Status: COMPLETED | OUTPATIENT
Start: 2022-07-24 | End: 2022-07-24

## 2022-07-24 RX ORDER — INSULIN GLARGINE 100 [IU]/ML
34 INJECTION, SOLUTION SUBCUTANEOUS AT BEDTIME
Refills: 0 | Status: DISCONTINUED | OUTPATIENT
Start: 2022-07-24 | End: 2022-07-25

## 2022-07-24 RX ORDER — SODIUM CHLORIDE 9 MG/ML
1000 INJECTION INTRAMUSCULAR; INTRAVENOUS; SUBCUTANEOUS
Refills: 0 | Status: DISCONTINUED | OUTPATIENT
Start: 2022-07-25 | End: 2022-07-27

## 2022-07-24 RX ORDER — POTASSIUM CHLORIDE 20 MEQ
20 PACKET (EA) ORAL
Refills: 0 | Status: COMPLETED | OUTPATIENT
Start: 2022-07-24 | End: 2022-07-24

## 2022-07-24 RX ADMIN — LOSARTAN POTASSIUM 100 MILLIGRAM(S): 100 TABLET, FILM COATED ORAL at 06:01

## 2022-07-24 RX ADMIN — Medication 20 MILLIEQUIVALENT(S): at 12:27

## 2022-07-24 RX ADMIN — ATORVASTATIN CALCIUM 40 MILLIGRAM(S): 80 TABLET, FILM COATED ORAL at 21:25

## 2022-07-24 RX ADMIN — CHLORHEXIDINE GLUCONATE 1 APPLICATION(S): 213 SOLUTION TOPICAL at 12:28

## 2022-07-24 RX ADMIN — Medication 16 UNIT(S): at 08:18

## 2022-07-24 RX ADMIN — LEVETIRACETAM 500 MILLIGRAM(S): 250 TABLET, FILM COATED ORAL at 06:01

## 2022-07-24 RX ADMIN — CHLORHEXIDINE GLUCONATE 1 APPLICATION(S): 213 SOLUTION TOPICAL at 21:33

## 2022-07-24 RX ADMIN — Medication 4 MILLIGRAM(S): at 06:02

## 2022-07-24 RX ADMIN — Medication 4 MILLIGRAM(S): at 12:27

## 2022-07-24 RX ADMIN — Medication 2: at 12:28

## 2022-07-24 RX ADMIN — Medication 20 MILLIEQUIVALENT(S): at 17:05

## 2022-07-24 RX ADMIN — Medication 12 UNIT(S): at 12:28

## 2022-07-24 RX ADMIN — Medication 4 MILLIGRAM(S): at 23:12

## 2022-07-24 RX ADMIN — LEVETIRACETAM 500 MILLIGRAM(S): 250 TABLET, FILM COATED ORAL at 17:03

## 2022-07-24 RX ADMIN — INSULIN GLARGINE 34 UNIT(S): 100 INJECTION, SOLUTION SUBCUTANEOUS at 21:26

## 2022-07-24 RX ADMIN — Medication 3 MILLIGRAM(S): at 21:26

## 2022-07-24 RX ADMIN — Medication 4 MILLIGRAM(S): at 17:03

## 2022-07-24 RX ADMIN — GABAPENTIN 200 MILLIGRAM(S): 400 CAPSULE ORAL at 21:25

## 2022-07-24 RX ADMIN — Medication 20 MILLIEQUIVALENT(S): at 15:05

## 2022-07-24 RX ADMIN — Medication 50 MILLIGRAM(S): at 15:05

## 2022-07-24 RX ADMIN — Medication 16 UNIT(S): at 17:03

## 2022-07-24 RX ADMIN — Medication 25 MILLIGRAM(S): at 06:01

## 2022-07-24 NOTE — PROGRESS NOTE ADULT - SUBJECTIVE AND OBJECTIVE BOX
DATE OF SERVICE: 07-24-22 @ 22:18    Patient is a 82y old  Female who presents with a chief complaint of abnormal imaging (24 Jul 2022 10:25)      SUBJECTIVE / OVERNIGHT EVENTS:  No chest pain. No shortness of breath. No complaints. No events overnight.     MEDICATIONS  (STANDING):  atorvastatin 40 milliGRAM(s) Oral at bedtime  chlorhexidine 2% Cloths 1 Application(s) Topical daily  dexAMETHasone     Tablet 4 milliGRAM(s) Oral every 6 hours  dextrose 5%. 1000 milliLiter(s) (100 mL/Hr) IV Continuous <Continuous>  dextrose 5%. 1000 milliLiter(s) (50 mL/Hr) IV Continuous <Continuous>  dextrose 50% Injectable 25 Gram(s) IV Push once  dextrose 50% Injectable 12.5 Gram(s) IV Push once  dextrose 50% Injectable 25 Gram(s) IV Push once  gabapentin 200 milliGRAM(s) Oral at bedtime  glucagon  Injectable 1 milliGRAM(s) IntraMuscular once  hydrochlorothiazide 25 milliGRAM(s) Oral daily  insulin glargine Injectable (LANTUS) 34 Unit(s) SubCutaneous at bedtime  insulin lispro (ADMELOG) corrective regimen sliding scale   SubCutaneous three times a day before meals  insulin lispro (ADMELOG) corrective regimen sliding scale   SubCutaneous at bedtime  insulin lispro Injectable (ADMELOG) 12 Unit(s) SubCutaneous before lunch  insulin lispro Injectable (ADMELOG) 16 Unit(s) SubCutaneous before dinner  insulin lispro Injectable (ADMELOG) 16 Unit(s) SubCutaneous before breakfast  levETIRAcetam 500 milliGRAM(s) Oral two times a day  losartan 100 milliGRAM(s) Oral daily  melatonin 3 milliGRAM(s) Oral at bedtime  metoprolol tartrate 50 milliGRAM(s) Oral three times a day    MEDICATIONS  (PRN):  dextrose Oral Gel 15 Gram(s) Oral once PRN Blood Glucose LESS THAN 70 milliGRAM(s)/deciliter      Vital Signs Last 24 Hrs  T(C): 36.9 (24 Jul 2022 16:00), Max: 36.9 (23 Jul 2022 23:16)  T(F): 98.5 (24 Jul 2022 16:00), Max: 98.5 (24 Jul 2022 16:00)  HR: 72 (24 Jul 2022 16:00) (56 - 72)  BP: 103/59 (24 Jul 2022 16:00) (91/61 - 142/69)  BP(mean): --  RR: 18 (24 Jul 2022 16:00) (18 - 18)  SpO2: 95% (24 Jul 2022 16:00) (95% - 99%)    Parameters below as of 24 Jul 2022 16:00  Patient On (Oxygen Delivery Method): room air      CAPILLARY BLOOD GLUCOSE      POCT Blood Glucose.: 122 mg/dL (24 Jul 2022 21:31)  POCT Blood Glucose.: 108 mg/dL (24 Jul 2022 16:37)  POCT Blood Glucose.: 154 mg/dL (24 Jul 2022 12:05)  POCT Blood Glucose.: 99 mg/dL (24 Jul 2022 08:02)    I&O's Summary    23 Jul 2022 07:01  -  24 Jul 2022 07:00  --------------------------------------------------------  IN: 300 mL / OUT: 0 mL / NET: 300 mL        PHYSICAL EXAM:  GENERAL: NAD, well-developed  HEAD:  Atraumatic, Normocephalic  EYES: EOMI, PERRLA, conjunctiva and sclera clear  NECK: Supple, No JVD  CHEST/LUNG: Clear to auscultation bilaterally; No wheeze  HEART: Regular rate and rhythm; No murmurs, rubs, or gallops  ABDOMEN: Soft, Nontender, Nondistended; Bowel sounds present  EXTREMITIES:  2+ Peripheral Pulses, No clubbing, cyanosis, or edema  PSYCH: AAOx3  NEUROLOGY: non-focal  SKIN: No rashes or lesions    LABS:                        15.2   14.95 )-----------( 374      ( 24 Jul 2022 06:15 )             47.1     07-24    142  |  102  |  38<H>  ----------------------------<  99  3.3<L>   |  26  |  1.08    Ca    9.6      24 Jul 2022 06:15      PT/INR - ( 24 Jul 2022 06:15 )   PT: 10.1 sec;   INR: 0.87 ratio         PTT - ( 24 Jul 2022 06:15 )  PTT:25.7 sec          RADIOLOGY & ADDITIONAL TESTS:    Imaging Personally Reviewed:    Consultant(s) Notes Reviewed:      Care Discussed with Consultants/Other Providers:

## 2022-07-24 NOTE — PROGRESS NOTE ADULT - PROBLEM SELECTOR PLAN 1
Will icontinue Lantus to 42u at bedtime., IF surgery tomorrow GIVE ONLY 34 Units Lantus tonight.  Will continue Admelog to 16u before breakfast and dinner, and 12 units pre-lunch( if Eating )) and continue Admelog correction scale coverage. Will continue monitoring FS and FU, will adjust dose as steroids are tapered/dc.  Patient counseled for compliance with consistent low carb diet and exercise as tolerated outpatient.

## 2022-07-24 NOTE — PROGRESS NOTE ADULT - SUBJECTIVE AND OBJECTIVE BOX
Patient seen and examined at bedside.    --Anticoagulation--    T(C): 36.9 (07-23-22 @ 23:16), Max: 36.9 (07-23-22 @ 23:16)  HR: 56 (07-23-22 @ 23:16) (56 - 62)  BP: 142/69 (07-23-22 @ 23:16) (109/57 - 142/69)  RR: 18 (07-23-22 @ 23:16) (18 - 18)  SpO2: 99% (07-23-22 @ 23:16) (96% - 99%)  Wt(kg): --    Exam:  AOx3, PERRL, EOMI, no drift, no facial droop, DE LA GARZA 5/5, SILT

## 2022-07-24 NOTE — PROGRESS NOTE ADULT - SUBJECTIVE AND OBJECTIVE BOX
CARDIOLOGY FOLLOW UP - Dr. Alcaraz  Date of Service: 7/24/2022  CC: no events    Review of Systems:  Constitutional: No fever, weight loss, or fatigue  Respiratory: No cough, wheezing, or hemoptysis, no shortness of breath  Cardiovascular: No chest pain, palpitations, passing out, dizziness, or leg swelling  Gastrointestinal: No abd or epigastric pain. No nausea, vomiting, or hematemesis; no diarrhea or consiptaiton, no melena or hematochezia  Vascular: No edema     TELEMETRY:    PHYSICAL EXAM:  T(C): 36.9 (07-23-22 @ 23:16), Max: 36.9 (07-23-22 @ 23:16)  HR: 56 (07-23-22 @ 23:16) (56 - 62)  BP: 142/69 (07-23-22 @ 23:16) (109/57 - 142/69)  RR: 18 (07-23-22 @ 23:16) (18 - 18)  SpO2: 99% (07-23-22 @ 23:16) (96% - 99%)  Wt(kg): --  I&O's Summary    23 Jul 2022 07:01  -  24 Jul 2022 07:00  --------------------------------------------------------  IN: 300 mL / OUT: 0 mL / NET: 300 mL        Appearance: Normal	  Cardiovascular: Normal S1 S2,RRR, No JVD, No murmurs  Respiratory: Lungs clear to auscultation	  Gastrointestinal:  Soft, Non-tender, + BS	  Extremities: Normal range of motion, No clubbing, cyanosis or edema  Vascular: Peripheral pulses palpable 2+ bilaterally       Home Medications:  aspirin 81 mg oral tablet: 1 tab(s) orally once a day (19 Jul 2022 14:45)  gabapentin 100 mg oral capsule: 2 cap(s) orally once a day (at bedtime) (19 Jul 2022 14:45)  glimepiride 4 mg oral tablet: 1 tab(s) orally 2 times a day (19 Jul 2022 14:45)  losartan-hydrochlorothiazide 100 mg-25 mg oral tablet: 1 tab(s) orally once a day (19 Jul 2022 14:45)  metFORMIN 1000 mg oral tablet: 1 tab(s) orally 2 times a day   (19 Jul 2022 14:45)  metoprolol tartrate 50 mg oral tablet: 1 tab(s) orally 3 times a day (19 Jul 2022 14:45)  simvastatin 40 mg oral tablet: 1 tab(s) orally once a day (at bedtime) (19 Jul 2022 14:45)  [place] Max SoloStar 300 units/mL subcutaneous solution: 6 unit(s) subcutaneous 2 times a day as directed (19 Jul 2022 14:45)  Victoza 18 mg/3 mL subcutaneous solution: 0.6 milligram(s) subcutaneous once a day (19 Jul 2022 14:45)        MEDICATIONS  (STANDING):  atorvastatin 40 milliGRAM(s) Oral at bedtime  chlorhexidine 2% Cloths 1 Application(s) Topical daily  chlorhexidine 4% Liquid 1 Application(s) Topical once  dexAMETHasone     Tablet 4 milliGRAM(s) Oral every 6 hours  dextrose 5%. 1000 milliLiter(s) (100 mL/Hr) IV Continuous <Continuous>  dextrose 5%. 1000 milliLiter(s) (50 mL/Hr) IV Continuous <Continuous>  dextrose 50% Injectable 25 Gram(s) IV Push once  dextrose 50% Injectable 12.5 Gram(s) IV Push once  dextrose 50% Injectable 25 Gram(s) IV Push once  gabapentin 200 milliGRAM(s) Oral at bedtime  glucagon  Injectable 1 milliGRAM(s) IntraMuscular once  hydrochlorothiazide 25 milliGRAM(s) Oral daily  insulin glargine Injectable (LANTUS) 42 Unit(s) SubCutaneous at bedtime  insulin lispro (ADMELOG) corrective regimen sliding scale   SubCutaneous three times a day before meals  insulin lispro (ADMELOG) corrective regimen sliding scale   SubCutaneous at bedtime  insulin lispro Injectable (ADMELOG) 12 Unit(s) SubCutaneous before lunch  insulin lispro Injectable (ADMELOG) 16 Unit(s) SubCutaneous before dinner  insulin lispro Injectable (ADMELOG) 16 Unit(s) SubCutaneous before breakfast  levETIRAcetam 500 milliGRAM(s) Oral two times a day  losartan 100 milliGRAM(s) Oral daily  melatonin 3 milliGRAM(s) Oral at bedtime  metoprolol tartrate 50 milliGRAM(s) Oral three times a day        EKG:  RADIOLOGY:  DIAGNOSTIC TESTING:  [ ] Echocardiogram:  [ ] Catherterization:  [ ] Stress Test:  OTHER:     LABS:	 	                          15.2   14.95 )-----------( 374      ( 24 Jul 2022 06:15 )             47.1     07-24    142  |  102  |  38<H>  ----------------------------<  99  3.3<L>   |  26  |  1.08    Ca    9.6      24 Jul 2022 06:15        PT/INR - ( 24 Jul 2022 06:15 )   PT: 10.1 sec;   INR: 0.87 ratio         PTT - ( 24 Jul 2022 06:15 )  PTT:25.7 sec    CARDIAC MARKERS:

## 2022-07-24 NOTE — PROGRESS NOTE ADULT - ASSESSMENT
82F mult med hx (no cancer hx) inc "stent in R leg" 2-3y ago on ASA/plavix, was called by her orthopedic surgeon, Dr. Alin Castro, telling her to come to ED 4d ago for abnormal MRI C-spine (7/14) c/f intracranial pathology. Pt waited until today to come in b/c feels fine. Reports balance/gait issues since April. CTH showing 4.8x3.2x3.2cm R temp mass and 1.4x1.3x1.6cm L frontal mass w/ vasogenic edema in mult lobes; ~8mm MLS. Also got MR L spine (7/14) form LBP radiating to legs showing multilevel foraminal stenosis/disc bulges; L5-S1 grade 1 spondy. Exam: AOx3, PERRL, EOMI, no facial, no drift, DE LA GARZA 5/5, SILT, no alfredo, no clonus.    -Adm Medicine, q4h neurochecks  -MRI brain showed 2 mets R temporal 4.7x3.7x3cm L frontal 1.4x1.5x1.2cm   -S/P bronchoscopy ANTHONY mass 7/20, pending final Path results  -please document medical clearance today  -Fu w/ Dr. Alcaraz today, please document cards clearance today, echo done EF 55%, inferolateral wall akinetic, no phth, impaired LV relaxation.   - Satting high 90s on RA, no pulm issues  - Please fu ARU/PRU this AM, last therapeutic 530/183  - ASA/Plavix held since 7/18 (s/p cardiac stent 1ya)  - please fu BLE duplex today  - Please adjust PM lantus for preop  - Cont dex, keppra

## 2022-07-24 NOTE — PROGRESS NOTE ADULT - ASSESSMENT
Assessment  DMT2: 82y Female with DM T2 with hyperglycemia, A1C 8.4%, was on oral meds and insulin at home, now on basal bolus insulin, increased dose yesterday, blood sugars trending nicely down in the setting of steroid management, no hypoglycemic episodes, eating meals, NAD, remains on dexamethasone.  Brain Mass: on meds, monitored, FU Neurosurgery.  Lung Mass: s/p bronchoscopy, CA workup in progress, monitored.  Thyroid Nodules: seen on imaging, B/L subcentimeter thyroid nodules, she denies neck mass/pain/dysphagia, euthyroid, reports known history and is being followed by Dr. Richardson.  Adrenal Nodule: seen on imaging, 1.2 cm left thyroid nodule, unknown history. AM cortisol suppressed 2/2 current steroid use, remaining labs pending..        Troy Thibodeaux MD  [phone]

## 2022-07-24 NOTE — PROGRESS NOTE ADULT - ASSESSMENT
81 y/o female (former smoker) with PMHx of HTN, CAD, HLD, MI, peripheral neuropathy, DM and peripheral artery disease presented to the ED for imaging for her head due to abnormal spine MRI by her orthopedist (Dr. Castro) during workup for her spine for her chronic back problems. The orthopedist noted something in her brain. Otherwise pt has no symptoms, stated that since 04/2022 she has had gait/balance issues. Endorsed that if she sits for a while and gets up, she feels like she is "drunk" and feels off. Denied chest pain, SOB, fevers, chills, numbness/paresthesias, muscular weakness, dizziness, abdominal pain, headaches.    brain mass  - likely metastatic lung cancer  - await mri  - decadron  - keppra  - seen by NS  - for poss resection tentatively scheduled for Monday  - pt has no medical contraindications for the planned procedure    lung mass  - pulm following  - bronch was done  - follow up path    diabetes  - hold oral hypoglycemics  - insulin ss  - hgb a1c  8.7   - elevated sugars are expected  - endo consult following    HTN  -  cont home meds    cad  - s/p stent  - hold asa and plavix for poss brain surgery    dvt px  - scd's

## 2022-07-24 NOTE — PROGRESS NOTE ADULT - ASSESSMENT
[facility] 6/23/21:   s/p successful angioplasty to the mid RCA followed by intracoronary brachytherapy.  [facility] 4/14/21: POBA of mid RCA (80%)   [facility] 3/25/21: PCI/POBA to pCX 90%     a/p  81 y/o female (former smoker) with PMHx of HTN, CAD sp PCi,  HLD, MI, peripheral neuropathy, DM and peripheral artery disease presented with abnl imaging on MRI as outpt      #Brain/ Lung  mass   -CT head/ CT chest noted   -work up hem/onc   -neurosx fu noted MRI brain showed 2 mets R temporal 4.7x3.7x3cm L frontal 1.4x1.5x1.2cm   -s/p bronchoscopy  -Dex 4q6 per neuro sx   -pulm fu   -per neurosx -Tentatively plan for OR tomorrow 7/25  -pt is medically optimized for the OT from CV perspective    #CAD sp PCI   -resume ASA given PCI hx once cleared by IR/neurosx -- currently SP lung Bx  -ecg with no ischemic changes   -possible plan for tumor debulking  -echo w inferlat akinesis and overall preserved EF check echo for optimization for possible sx      dvt ppx     35 minutes spent on total encounter; more than 50% of the visit was spent counseling and/or coordinating care by the attending physician.

## 2022-07-24 NOTE — PROGRESS NOTE ADULT - SUBJECTIVE AND OBJECTIVE BOX
Endocrinology Attending Covering for Dr. Tovar      Chief complaint  Patient is a 82y old  Female who presents with a chief complaint of abnormal imaging (24 Jul 2022 07:57)   Review of systems  Patient in bed, looks comfortable, no fever,  had no hypoglycemia.    Labs and Fingersticks  CAPILLARY BLOOD GLUCOSE      POCT Blood Glucose.: 99 mg/dL (24 Jul 2022 08:02)  POCT Blood Glucose.: 147 mg/dL (23 Jul 2022 21:32)  POCT Blood Glucose.: 171 mg/dL (23 Jul 2022 17:03)  POCT Blood Glucose.: 122 mg/dL (23 Jul 2022 13:06)  POCT Blood Glucose.: 176 mg/dL (23 Jul 2022 11:24)      Anion Gap, Serum: 14 (07-24 @ 06:15)      Calcium, Total Serum: 9.6 (07-24 @ 06:15)          07-24    142  |  102  |  38<H>  ----------------------------<  99  3.3<L>   |  26  |  1.08    Ca    9.6      24 Jul 2022 06:15                          15.2   14.95 )-----------( 374      ( 24 Jul 2022 06:15 )             47.1     Medications  MEDICATIONS  (STANDING):  atorvastatin 40 milliGRAM(s) Oral at bedtime  chlorhexidine 2% Cloths 1 Application(s) Topical daily  chlorhexidine 4% Liquid 1 Application(s) Topical once  dexAMETHasone     Tablet 4 milliGRAM(s) Oral every 6 hours  dextrose 5%. 1000 milliLiter(s) (100 mL/Hr) IV Continuous <Continuous>  dextrose 5%. 1000 milliLiter(s) (50 mL/Hr) IV Continuous <Continuous>  dextrose 50% Injectable 25 Gram(s) IV Push once  dextrose 50% Injectable 12.5 Gram(s) IV Push once  dextrose 50% Injectable 25 Gram(s) IV Push once  gabapentin 200 milliGRAM(s) Oral at bedtime  glucagon  Injectable 1 milliGRAM(s) IntraMuscular once  hydrochlorothiazide 25 milliGRAM(s) Oral daily  insulin glargine Injectable (LANTUS) 42 Unit(s) SubCutaneous at bedtime  insulin lispro (ADMELOG) corrective regimen sliding scale   SubCutaneous three times a day before meals  insulin lispro (ADMELOG) corrective regimen sliding scale   SubCutaneous at bedtime  insulin lispro Injectable (ADMELOG) 12 Unit(s) SubCutaneous before lunch  insulin lispro Injectable (ADMELOG) 16 Unit(s) SubCutaneous before dinner  insulin lispro Injectable (ADMELOG) 16 Unit(s) SubCutaneous before breakfast  levETIRAcetam 500 milliGRAM(s) Oral two times a day  losartan 100 milliGRAM(s) Oral daily  melatonin 3 milliGRAM(s) Oral at bedtime  metoprolol tartrate 50 milliGRAM(s) Oral three times a day  potassium chloride    Tablet ER 20 milliEquivalent(s) Oral every 2 hours      Physical Exam  General: Patient comfortable in bed  Vital Signs Last 12 Hrs  T(F): 98.4 (07-23-22 @ 23:16), Max: 98.4 (07-23-22 @ 23:16)  HR: 56 (07-23-22 @ 23:16) (56 - 56)  BP: 142/69 (07-23-22 @ 23:16) (142/69 - 142/69)  BP(mean): --  RR: 18 (07-23-22 @ 23:16) (18 - 18)  SpO2: 99% (07-23-22 @ 23:16) (99% - 99%)  Neck: No palpable thyroid nodules.  CVS: S1S2, No murmurs  Respiratory: No wheezing, no crepitations  GI: Abdomen soft, bowel sounds positive  Musculoskeletal:  edema lower extremities.   Skin: No skin rashes, no ecchymosis    Diagnostics

## 2022-07-25 LAB
ANION GAP SERPL CALC-SCNC: 11 MMOL/L — SIGNIFICANT CHANGE UP (ref 5–17)
BUN SERPL-MCNC: 52 MG/DL — HIGH (ref 7–23)
CALCIUM SERPL-MCNC: 8.8 MG/DL — SIGNIFICANT CHANGE UP (ref 8.4–10.5)
CHLORIDE SERPL-SCNC: 107 MMOL/L — SIGNIFICANT CHANGE UP (ref 96–108)
CO2 SERPL-SCNC: 23 MMOL/L — SIGNIFICANT CHANGE UP (ref 22–31)
CREAT SERPL-MCNC: 1.06 MG/DL — SIGNIFICANT CHANGE UP (ref 0.5–1.3)
EGFR: 52 ML/MIN/1.73M2 — LOW
GLUCOSE BLDC GLUCOMTR-MCNC: 108 MG/DL — HIGH (ref 70–99)
GLUCOSE BLDC GLUCOMTR-MCNC: 117 MG/DL — HIGH (ref 70–99)
GLUCOSE BLDC GLUCOMTR-MCNC: 119 MG/DL — HIGH (ref 70–99)
GLUCOSE BLDC GLUCOMTR-MCNC: 132 MG/DL — HIGH (ref 70–99)
GLUCOSE SERPL-MCNC: 75 MG/DL — SIGNIFICANT CHANGE UP (ref 70–99)
PA ADP PRP-ACNC: 209 PRU — SIGNIFICANT CHANGE UP (ref 194–417)
POTASSIUM SERPL-MCNC: 4.2 MMOL/L — SIGNIFICANT CHANGE UP (ref 3.5–5.3)
POTASSIUM SERPL-SCNC: 4.2 MMOL/L — SIGNIFICANT CHANGE UP (ref 3.5–5.3)
SODIUM SERPL-SCNC: 141 MMOL/L — SIGNIFICANT CHANGE UP (ref 135–145)

## 2022-07-25 RX ORDER — INSULIN GLARGINE 100 [IU]/ML
30 INJECTION, SOLUTION SUBCUTANEOUS AT BEDTIME
Refills: 0 | Status: DISCONTINUED | OUTPATIENT
Start: 2022-07-25 | End: 2022-07-25

## 2022-07-25 RX ORDER — INSULIN GLARGINE 100 [IU]/ML
36 INJECTION, SOLUTION SUBCUTANEOUS AT BEDTIME
Refills: 0 | Status: DISCONTINUED | OUTPATIENT
Start: 2022-07-25 | End: 2022-07-26

## 2022-07-25 RX ORDER — SENNA PLUS 8.6 MG/1
2 TABLET ORAL ONCE
Refills: 0 | Status: COMPLETED | OUTPATIENT
Start: 2022-07-25 | End: 2022-07-25

## 2022-07-25 RX ORDER — SODIUM CHLORIDE 9 MG/ML
250 INJECTION INTRAMUSCULAR; INTRAVENOUS; SUBCUTANEOUS ONCE
Refills: 0 | Status: COMPLETED | OUTPATIENT
Start: 2022-07-25 | End: 2022-07-26

## 2022-07-25 RX ADMIN — Medication 25 MILLIGRAM(S): at 06:33

## 2022-07-25 RX ADMIN — Medication 4 MILLIGRAM(S): at 06:33

## 2022-07-25 RX ADMIN — Medication 12 UNIT(S): at 11:54

## 2022-07-25 RX ADMIN — INSULIN GLARGINE 36 UNIT(S): 100 INJECTION, SOLUTION SUBCUTANEOUS at 21:23

## 2022-07-25 RX ADMIN — LOSARTAN POTASSIUM 100 MILLIGRAM(S): 100 TABLET, FILM COATED ORAL at 06:33

## 2022-07-25 RX ADMIN — CHLORHEXIDINE GLUCONATE 1 APPLICATION(S): 213 SOLUTION TOPICAL at 11:55

## 2022-07-25 RX ADMIN — Medication 16 UNIT(S): at 17:30

## 2022-07-25 RX ADMIN — Medication 4 MILLIGRAM(S): at 16:42

## 2022-07-25 RX ADMIN — Medication 4 MILLIGRAM(S): at 11:55

## 2022-07-25 RX ADMIN — ATORVASTATIN CALCIUM 40 MILLIGRAM(S): 80 TABLET, FILM COATED ORAL at 21:23

## 2022-07-25 RX ADMIN — LEVETIRACETAM 500 MILLIGRAM(S): 250 TABLET, FILM COATED ORAL at 06:33

## 2022-07-25 RX ADMIN — Medication 3 MILLIGRAM(S): at 23:24

## 2022-07-25 RX ADMIN — Medication 50 MILLIGRAM(S): at 16:42

## 2022-07-25 RX ADMIN — GABAPENTIN 200 MILLIGRAM(S): 400 CAPSULE ORAL at 21:23

## 2022-07-25 RX ADMIN — LEVETIRACETAM 500 MILLIGRAM(S): 250 TABLET, FILM COATED ORAL at 16:42

## 2022-07-25 RX ADMIN — SENNA PLUS 2 TABLET(S): 8.6 TABLET ORAL at 21:23

## 2022-07-25 NOTE — PROGRESS NOTE ADULT - ASSESSMENT
1) Lung and brain masses likely a metastatic process.   - NSGY planned    Await pathology from bronchoscopy.   patient has also been seen by rad/Onc. Getting steroids and keppra.   Further med onc plan once pathology results are finalized.     Thrombocytosis , now resolved.

## 2022-07-25 NOTE — PROGRESS NOTE ADULT - ASSESSMENT
Assessment  DMT2: 82y Female with DM T2 with hyperglycemia, A1C 8.4%, was on oral meds and insulin at home, now on basal bolus insulin, blood sugars trending down this morning (blood sugar 75) and trending within otherwise acceptable range postprandially, no hypoglycemic episodes, eating meals, NAD, remains on same-dose dexamethasone.  Brain Mass: on meds, monitored, FU Neurosurgery.  Lung Mass: s/p bronchoscopy, CA workup in progress, monitored.  Thyroid Nodules: seen on imaging, B/L subcentimeter thyroid nodules, she denies neck mass/pain/dysphagia, euthyroid, reports known history and is being followed by Dr. Richardson.  Adrenal Nodule: seen on imaging, 1.2 cm left thyroid nodule, unknown history. AM cortisol suppressed 2/2 current steroid use, remaining labs pending..        Troy Thibodeaux MD  [phone]               Assessment  DMT2: 82y Female with DM T2 with hyperglycemia, A1C 8.4%, was on oral meds and insulin at home, now on basal bolus insulin, received partial-dose Lantus 2/2 NPO status, blood sugars trending down this morning (blood sugar 75) and in otherwise acceptable range postprandially, no hypoglycemic episodes, eating meals, NAD, remains on same-dose dexamethasone, OR postponed.  Brain Mass: on meds, monitored, FU Neurosurgery.  Lung Mass: s/p bronchoscopy, CA workup in progress, monitored.  Thyroid Nodules: seen on imaging, B/L subcentimeter thyroid nodules, she denies neck mass/pain/dysphagia, euthyroid, reports known history and is being followed by Dr. Richardson.  Adrenal Nodule: seen on imaging, 1.2 cm left thyroid nodule, unknown history. AM cortisol suppressed 2/2 current steroid use, remaining labs pending..        Troy Thibodeaux MD  [phone]

## 2022-07-25 NOTE — PROGRESS NOTE ADULT - SUBJECTIVE AND OBJECTIVE BOX
CARDIOLOGY FOLLOW UP - Dr. Alcaraz  Date of Service: 7/25/2022  CC: no cp/sob    Review of Systems:  Constitutional: No fever, weight loss, or fatigue  Respiratory: No cough, wheezing, or hemoptysis, no shortness of breath  Cardiovascular: No chest pain, palpitations, passing out, dizziness, or leg swelling  Gastrointestinal: No abd or epigastric pain. No nausea, vomiting, or hematemesis; no diarrhea or consiptaiton, no melena or hematochezia  Vascular: No edema     TELEMETRY:    PHYSICAL EXAM:  T(C): 36.7 (07-25-22 @ 09:56), Max: 36.9 (07-24-22 @ 16:00)  HR: 56 (07-25-22 @ 09:56) (56 - 72)  BP: 109/49 (07-25-22 @ 09:56) (101/54 - 109/49)  RR: 18 (07-25-22 @ 09:56) (18 - 18)  SpO2: 99% (07-25-22 @ 09:56) (95% - 99%)  Wt(kg): --  I&O's Summary    25 Jul 2022 07:01  -  25 Jul 2022 12:45  --------------------------------------------------------  IN: 0 mL / OUT: 0 mL / NET: 0 mL        Appearance: Normal	  Cardiovascular: Normal S1 S2,RRR, No JVD, No murmurs  Respiratory: Lungs clear to auscultation	  Gastrointestinal:  Soft, Non-tender, + BS	  Extremities: Normal range of motion, No clubbing, cyanosis or edema  Vascular: Peripheral pulses palpable 2+ bilaterally       Home Medications:  aspirin 81 mg oral tablet: 1 tab(s) orally once a day (19 Jul 2022 14:45)  gabapentin 100 mg oral capsule: 2 cap(s) orally once a day (at bedtime) (19 Jul 2022 14:45)  glimepiride 4 mg oral tablet: 1 tab(s) orally 2 times a day (19 Jul 2022 14:45)  losartan-hydrochlorothiazide 100 mg-25 mg oral tablet: 1 tab(s) orally once a day (19 Jul 2022 14:45)  metFORMIN 1000 mg oral tablet: 1 tab(s) orally 2 times a day   (19 Jul 2022 14:45)  metoprolol tartrate 50 mg oral tablet: 1 tab(s) orally 3 times a day (19 Jul 2022 14:45)  simvastatin 40 mg oral tablet: 1 tab(s) orally once a day (at bedtime) (19 Jul 2022 14:45)  [place] Max SoloStar 300 units/mL subcutaneous solution: 6 unit(s) subcutaneous 2 times a day as directed (19 Jul 2022 14:45)  Victoza 18 mg/3 mL subcutaneous solution: 0.6 milligram(s) subcutaneous once a day (19 Jul 2022 14:45)        MEDICATIONS  (STANDING):  atorvastatin 40 milliGRAM(s) Oral at bedtime  chlorhexidine 2% Cloths 1 Application(s) Topical daily  dexAMETHasone     Tablet 4 milliGRAM(s) Oral every 6 hours  dextrose 5%. 1000 milliLiter(s) (100 mL/Hr) IV Continuous <Continuous>  dextrose 5%. 1000 milliLiter(s) (50 mL/Hr) IV Continuous <Continuous>  dextrose 50% Injectable 25 Gram(s) IV Push once  dextrose 50% Injectable 12.5 Gram(s) IV Push once  dextrose 50% Injectable 25 Gram(s) IV Push once  gabapentin 200 milliGRAM(s) Oral at bedtime  glucagon  Injectable 1 milliGRAM(s) IntraMuscular once  hydrochlorothiazide 25 milliGRAM(s) Oral daily  insulin glargine Injectable (LANTUS) 30 Unit(s) SubCutaneous at bedtime  insulin lispro (ADMELOG) corrective regimen sliding scale   SubCutaneous three times a day before meals  insulin lispro (ADMELOG) corrective regimen sliding scale   SubCutaneous at bedtime  insulin lispro Injectable (ADMELOG) 12 Unit(s) SubCutaneous before lunch  insulin lispro Injectable (ADMELOG) 16 Unit(s) SubCutaneous before dinner  insulin lispro Injectable (ADMELOG) 16 Unit(s) SubCutaneous before breakfast  levETIRAcetam 500 milliGRAM(s) Oral two times a day  losartan 100 milliGRAM(s) Oral daily  melatonin 3 milliGRAM(s) Oral at bedtime  metoprolol tartrate 50 milliGRAM(s) Oral three times a day  sodium chloride 0.9%. 1000 milliLiter(s) (75 mL/Hr) IV Continuous <Continuous>        EKG:  RADIOLOGY:  DIAGNOSTIC TESTING:  [ ] Echocardiogram:  [ ] Catherterization:  [ ] Stress Test:  OTHER:     LABS:	 	                          15.2   14.95 )-----------( 374      ( 24 Jul 2022 06:15 )             47.1     07-25    141  |  107  |  52<H>  ----------------------------<  75  4.2   |  23  |  1.06    Ca    8.8      25 Jul 2022 05:46        PT/INR - ( 24 Jul 2022 06:15 )   PT: 10.1 sec;   INR: 0.87 ratio         PTT - ( 24 Jul 2022 06:15 )  PTT:25.7 sec    CARDIAC MARKERS:

## 2022-07-25 NOTE — PROGRESS NOTE ADULT - ASSESSMENT
83 y/o female (former smoker) with PMHx of HTN, CAD, HLD, MI, peripheral neuropathy, DM and peripheral artery disease presented to the ED for imaging for her head due to abnormal spine MRI by her orthopedist (Dr. Castro) during workup for her spine for her chronic back problems. The orthopedist noted something in her brain. Otherwise pt has no symptoms, stated that since 04/2022 she has had gait/balance issues. Endorsed that if she sits for a while and gets up, she feels like she is "drunk" and feels off. Denied chest pain, SOB, fevers, chills, numbness/paresthesias, muscular weakness, dizziness, abdominal pain, headaches.    brain mass  - likely metastatic lung cancer  -  mri done  - decadron  - keppra  - seen by NS  - for poss resection tentatively scheduled for wednesday  - pt has no medical contraindications for the planned procedure    lung mass  - pulm following  - bronch was done  - follow up path    diabetes  - hold oral hypoglycemics  - insulin ss  - hgb a1c  8.7   - elevated sugars are expected  - endo consult following    HTN  -  cont home meds    cad  - s/p stent  - hold asa and plavix for poss brain surgery    dvt px  - scd's

## 2022-07-25 NOTE — PROGRESS NOTE ADULT - SUBJECTIVE AND OBJECTIVE BOX
Chief complaint  Patient is a 82y old  Female who presents with a chief complaint of abnormal imaging (25 Jul 2022 12:45)   Review of systems  Patient in bed, looks comfortable, no hypoglycemic episodes.    Labs and Fingersticks  CAPILLARY BLOOD GLUCOSE      POCT Blood Glucose.: 119 mg/dL (25 Jul 2022 11:31)  POCT Blood Glucose.: 108 mg/dL (25 Jul 2022 07:35)  POCT Blood Glucose.: 122 mg/dL (24 Jul 2022 21:31)  POCT Blood Glucose.: 108 mg/dL (24 Jul 2022 16:37)      Anion Gap, Serum: 11 (07-25 @ 05:46)  Anion Gap, Serum: 14 (07-24 @ 06:15)      Calcium, Total Serum: 8.8 (07-25 @ 05:46)  Calcium, Total Serum: 9.6 (07-24 @ 06:15)          07-25    141  |  107  |  52<H>  ----------------------------<  75  4.2   |  23  |  1.06    Ca    8.8      25 Jul 2022 05:46                          15.2   14.95 )-----------( 374      ( 24 Jul 2022 06:15 )             47.1     Medications  MEDICATIONS  (STANDING):  atorvastatin 40 milliGRAM(s) Oral at bedtime  chlorhexidine 2% Cloths 1 Application(s) Topical daily  dexAMETHasone     Tablet 4 milliGRAM(s) Oral every 6 hours  dextrose 5%. 1000 milliLiter(s) (100 mL/Hr) IV Continuous <Continuous>  dextrose 5%. 1000 milliLiter(s) (50 mL/Hr) IV Continuous <Continuous>  dextrose 50% Injectable 25 Gram(s) IV Push once  dextrose 50% Injectable 12.5 Gram(s) IV Push once  dextrose 50% Injectable 25 Gram(s) IV Push once  gabapentin 200 milliGRAM(s) Oral at bedtime  glucagon  Injectable 1 milliGRAM(s) IntraMuscular once  hydrochlorothiazide 25 milliGRAM(s) Oral daily  insulin glargine Injectable (LANTUS) 30 Unit(s) SubCutaneous at bedtime  insulin lispro (ADMELOG) corrective regimen sliding scale   SubCutaneous three times a day before meals  insulin lispro (ADMELOG) corrective regimen sliding scale   SubCutaneous at bedtime  insulin lispro Injectable (ADMELOG) 12 Unit(s) SubCutaneous before lunch  insulin lispro Injectable (ADMELOG) 16 Unit(s) SubCutaneous before dinner  insulin lispro Injectable (ADMELOG) 16 Unit(s) SubCutaneous before breakfast  levETIRAcetam 500 milliGRAM(s) Oral two times a day  losartan 100 milliGRAM(s) Oral daily  melatonin 3 milliGRAM(s) Oral at bedtime  metoprolol tartrate 50 milliGRAM(s) Oral three times a day  sodium chloride 0.9%. 1000 milliLiter(s) (75 mL/Hr) IV Continuous <Continuous>      Physical Exam  General: Patient comfortable in bed  Vital Signs Last 12 Hrs  T(F): 98 (07-25-22 @ 09:56), Max: 98 (07-25-22 @ 09:56)  HR: 56 (07-25-22 @ 09:56) (56 - 56)  BP: 109/49 (07-25-22 @ 09:56) (109/49 - 109/49)  BP(mean): --  RR: 18 (07-25-22 @ 09:56) (18 - 18)  SpO2: 99% (07-25-22 @ 09:56) (99% - 99%)  Neck: No palpable thyroid nodules.  CVS: S1S2, No murmurs  Respiratory: No wheezing, no crepitations  GI: Abdomen soft, bowel sounds positive  Musculoskeletal:  edema lower extremities.   Skin: No skin rashes, no ecchymosis    Diagnostics    Metanephrine, Plasma: AM Sched. Collection: 21-Jul-2022 06:00 (07-20 @ 10:54)  Aldosterone, Serum: AM Sched. Collection: 21-Jul-2022 06:00 (07-20 @ 10:54)  Cortisol AM, Serum: AM Sched. Collection: 21-Jul-2022 06:00 (07-20 @ 10:54)

## 2022-07-25 NOTE — PROGRESS NOTE ADULT - ASSESSMENT
[facility] 6/23/21:   s/p successful angioplasty to the mid RCA followed by intracoronary brachytherapy.  [facility] 4/14/21: POBA of mid RCA (80%)   [facility] 3/25/21: PCI/POBA to pCX 90%     a/p  83 y/o female (former smoker) with PMHx of HTN, CAD sp PCi,  HLD, MI, peripheral neuropathy, DM and peripheral artery disease presented with abnl imaging on MRI as outpt      #Brain/ Lung  mass   -CT head/ CT chest noted   -work up hem/onc   -neurosx fu noted MRI brain showed 2 mets R temporal 4.7x3.7x3cm L frontal 1.4x1.5x1.2cm   -s/p bronchoscopy  -Dex 4q6 per neuro sx   -pulm fu   -per neurosx -Tentatively plan for OR   -pt is medically optimized for the OT from CV perspective    #CAD sp PCI   -resume ASA given PCI hx once cleared by IR/neurosx -- currently SP lung Bx  -ecg with no ischemic changes   -possible plan for tumor debulking  -echo w inferlat akinesis and overall preserved EF check echo for optimization for possible sx      dvt ppx     35 minutes spent on total encounter; more than 50% of the visit was spent counseling and/or coordinating care by the attending physician.

## 2022-07-25 NOTE — PROGRESS NOTE ADULT - ASSESSMENT
ASSESSMENT:    82 year old gentlewoman, former smoker, without history of intrinsic lung disease. She has a history of HTN, HLD, DM, CAD s/p PCI (1990s and 2021) and PAD s/p RLE stenting. The patient has been having difficulty ambulating over the last several months due to a sense of imbalance with impaired gait. Her family has noted a change in her personality with pressured speech and insomnia. Outpatient cervical MRI incidentally note a CNS lesion. Lumbar spine MRI performed due to chronic back pain revealed multilevel foraminal stenosis/disc bulges. 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 The patient has no shortness of breath or hypoxemia on room air. She has no cough, sputum production, chest congestion or wheeze. No fevers, chills or sweats. No chest pain/pressure or palpitations.     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 presenting with several months of ataxia - frontal lobe lesions have lead to decreased inhibition as well as to pressured and loquacious speech      PLAN/RECOMMENDATIONS:    stable oxygenation on room air  spirometry    FEV1 - 1.81 liters - 88% predicted    FVC - 2.48 liters - 90% predicted    FEV1% - 73       c/w normal spiromatery  no indication for pulmonary medications or antibiotics  s/p bronchoscopy -> preliminary pathology -> poorly differentiated carcinoma -> special stains are pending  neurosurgery evaluation noted    neurosurgery for tumor debulking is scheduled for Wednesday - NPO discontinued    MRI -> right temporal lobe peripherally enhancing, centrally necrotic 3.1 x 2.6 cm mass demonstrating mild diffusion restriction - there is surrounding vasogenic edema - 8 mm leftward midline shift right uncal herniation - additional left parafalcine 1.3 x 0.9 cm rim-enhancing cystic lesion with mild surrounding vasogenic edema - tiny 0.3 x 0.3 cm rim-enhancing lesion within the left frontal cortex - findings are concerning for metastatic disease - the left lateral ventricle is mildly dilated    no anticoagulation, ASA or plavix    prophylactic anticonvulsants -> keppra    decadron 4mg q6h po  endocrinology evaluation noted    insulin regimen being adjusted for steroid induced hyperglycemia  radiation oncology, neurooncology, medical oncology evaluations noted  cardiology     ASA and plavix are currently on hold in anticipation of neurosurgery - patient had PCI last year    continue lipitor/lopressor/losartan/HCTZ    ECHO - normal LVEF - no significant valvular heart disease    Will follow with you. Plan of care discussed with the patient and her daughter-in-law by phone and with Crissy Garza and Ebonie. There is no pulmonary contraindication to the proposed neurosurgical procedure    Satish Hopkins MD, [facility]  [phone]  Pulmonary Medicine

## 2022-07-25 NOTE — PROGRESS NOTE ADULT - SUBJECTIVE AND OBJECTIVE BOX
LANIE BERTRAND  MRN-[id number]    Patient is a 82y old  Female who presents with a chief complaint of abnormal imaging (24 Jul 2022 22:18)      Review of System:    Pt reports being comfortable  Without complaints    General:	Denies fatigue, fevers, chills, sweats, decreased appetite.    Skin/Breast: denies pruritis, rash  	  Ophthalmologic: no change in vision or blurring  	  HEENT	Denies dry mouth, oral sores, dysphagia,  change in hearing.    Respiratory and Thorax:  no cough, sob, wheeze, hemoptysis  	  Cardiovascular:	no cp , palp, orthopnea    Gastrointestinal:	no n/v/d constipation    Genitourinary:	no dysuria of frequency, no hematuria, no flank pain    Musculoskeletal:	no bone or joint pain. no muscle aches.     Neurological:	no change in sensory or motor function. no headache. no weakness.     Psychiatric:	no depression, no anxiety, insomnia.     Hematology/Lymphatics:	no bleeding or bruising    Current Meds  MEDICATIONS  (STANDING):  atorvastatin 40 milliGRAM(s) Oral at bedtime  chlorhexidine 2% Cloths 1 Application(s) Topical daily  dexAMETHasone     Tablet 4 milliGRAM(s) Oral every 6 hours  dextrose 5%. 1000 milliLiter(s) (100 mL/Hr) IV Continuous <Continuous>  dextrose 5%. 1000 milliLiter(s) (50 mL/Hr) IV Continuous <Continuous>  dextrose 50% Injectable 25 Gram(s) IV Push once  dextrose 50% Injectable 12.5 Gram(s) IV Push once  dextrose 50% Injectable 25 Gram(s) IV Push once  gabapentin 200 milliGRAM(s) Oral at bedtime  glucagon  Injectable 1 milliGRAM(s) IntraMuscular once  hydrochlorothiazide 25 milliGRAM(s) Oral daily  insulin glargine Injectable (LANTUS) 34 Unit(s) SubCutaneous at bedtime  insulin lispro (ADMELOG) corrective regimen sliding scale   SubCutaneous three times a day before meals  insulin lispro (ADMELOG) corrective regimen sliding scale   SubCutaneous at bedtime  insulin lispro Injectable (ADMELOG) 12 Unit(s) SubCutaneous before lunch  insulin lispro Injectable (ADMELOG) 16 Unit(s) SubCutaneous before dinner  insulin lispro Injectable (ADMELOG) 16 Unit(s) SubCutaneous before breakfast  levETIRAcetam 500 milliGRAM(s) Oral two times a day  losartan 100 milliGRAM(s) Oral daily  melatonin 3 milliGRAM(s) Oral at bedtime  metoprolol tartrate 50 milliGRAM(s) Oral three times a day  sodium chloride 0.9%. 1000 milliLiter(s) (75 mL/Hr) IV Continuous <Continuous>    MEDICATIONS  (PRN):  dextrose Oral Gel 15 Gram(s) Oral once PRN Blood Glucose LESS THAN 70 milliGRAM(s)/deciliter      Vital Signs Last 24 Hrs  T(C): 36.8 (24 Jul 2022 23:09), Max: 36.9 (24 Jul 2022 16:00)  T(F): 98.3 (24 Jul 2022 23:09), Max: 98.5 (24 Jul 2022 16:00)  HR: 58 (24 Jul 2022 23:09) (58 - 72)  BP: 101/54 (24 Jul 2022 23:09) (91/61 - 103/59)  BP(mean): --  RR: 18 (24 Jul 2022 23:09) (18 - 18)  SpO2: 98% (24 Jul 2022 23:09) (95% - 99%)    Parameters below as of 24 Jul 2022 23:09  Patient On (Oxygen Delivery Method): room air      PHYSICAL EXAM:    Constitutional: NAD    Eyes: PERRLA EOMI, anicteric sclera    Heent :No oral sores, no pharyngeal injection. moist mucosa.    Neck: supple, no jvd, no LAD    Respiratory: CTA b/l     Cardiovascular: s1s2, no m/g/r    Gastrointestinal: soft, nt, nd, + BS    Extremities: no c/c/e    Neurological:A&O x 3 moves all ext.    Skin: no rash on exposed skin    Lymph Nodes: no lymphadenopathy.      Lab  CBC Full  -  ( 24 Jul 2022 06:15 )  WBC Count : 14.95 K/uL  RBC Count : 4.97 M/uL  Hemoglobin : 15.2 g/dL  Hematocrit : 47.1 %  Platelet Count - Automated : 374 K/uL  Mean Cell Volume : 94.8 fl  Mean Cell Hemoglobin : 30.6 pg  Mean Cell Hemoglobin Concentration : 32.3 gm/dL  Auto Neutrophil # : x  Auto Lymphocyte # : x  Auto Monocyte # : x  Auto Eosinophil # : x  Auto Basophil # : x  Auto Neutrophil % : x  Auto Lymphocyte % : x  Auto Monocyte % : x  Auto Eosinophil % : x  Auto Basophil % : x    07-25    141  |  107  |  52<H>  ----------------------------<  75  4.2   |  23  |  1.06    Ca    8.8      25 Jul 2022 05:46      PT/INR - ( 24 Jul 2022 06:15 )   PT: 10.1 sec;   INR: 0.87 ratio         PTT - ( 24 Jul 2022 06:15 )  PTT:25.7 sec    Rad:    Assessment/Plan

## 2022-07-25 NOTE — PROGRESS NOTE ADULT - SUBJECTIVE AND OBJECTIVE BOX
DATE OF SERVICE: 07-25-22 @ 17:38    Patient is a 82y old  Female who presents with a chief complaint of abnormal imaging (25 Jul 2022 14:24)      SUBJECTIVE / OVERNIGHT EVENTS:  No chest pain. No shortness of breath. No complaints. No events overnight.     MEDICATIONS  (STANDING):  atorvastatin 40 milliGRAM(s) Oral at bedtime  chlorhexidine 2% Cloths 1 Application(s) Topical daily  dexAMETHasone     Tablet 4 milliGRAM(s) Oral every 6 hours  dextrose 5%. 1000 milliLiter(s) (50 mL/Hr) IV Continuous <Continuous>  dextrose 5%. 1000 milliLiter(s) (100 mL/Hr) IV Continuous <Continuous>  dextrose 50% Injectable 25 Gram(s) IV Push once  dextrose 50% Injectable 12.5 Gram(s) IV Push once  dextrose 50% Injectable 25 Gram(s) IV Push once  gabapentin 200 milliGRAM(s) Oral at bedtime  glucagon  Injectable 1 milliGRAM(s) IntraMuscular once  hydrochlorothiazide 25 milliGRAM(s) Oral daily  insulin glargine Injectable (LANTUS) 36 Unit(s) SubCutaneous at bedtime  insulin lispro (ADMELOG) corrective regimen sliding scale   SubCutaneous three times a day before meals  insulin lispro (ADMELOG) corrective regimen sliding scale   SubCutaneous at bedtime  insulin lispro Injectable (ADMELOG) 12 Unit(s) SubCutaneous before lunch  insulin lispro Injectable (ADMELOG) 16 Unit(s) SubCutaneous before dinner  insulin lispro Injectable (ADMELOG) 16 Unit(s) SubCutaneous before breakfast  levETIRAcetam 500 milliGRAM(s) Oral two times a day  losartan 100 milliGRAM(s) Oral daily  melatonin 3 milliGRAM(s) Oral at bedtime  metoprolol tartrate 50 milliGRAM(s) Oral three times a day  sodium chloride 0.9%. 1000 milliLiter(s) (75 mL/Hr) IV Continuous <Continuous>    MEDICATIONS  (PRN):  dextrose Oral Gel 15 Gram(s) Oral once PRN Blood Glucose LESS THAN 70 milliGRAM(s)/deciliter      Vital Signs Last 24 Hrs  T(C): 36.8 (25 Jul 2022 16:27), Max: 36.8 (24 Jul 2022 23:09)  T(F): 98.3 (25 Jul 2022 16:27), Max: 98.3 (24 Jul 2022 23:09)  HR: 72 (25 Jul 2022 16:27) (56 - 72)  BP: 105/73 (25 Jul 2022 16:27) (101/54 - 109/49)  BP(mean): --  RR: 18 (25 Jul 2022 16:27) (18 - 18)  SpO2: 99% (25 Jul 2022 16:27) (98% - 99%)    Parameters below as of 25 Jul 2022 16:27  Patient On (Oxygen Delivery Method): room air      CAPILLARY BLOOD GLUCOSE      POCT Blood Glucose.: 117 mg/dL (25 Jul 2022 16:58)  POCT Blood Glucose.: 119 mg/dL (25 Jul 2022 11:31)  POCT Blood Glucose.: 108 mg/dL (25 Jul 2022 07:35)  POCT Blood Glucose.: 122 mg/dL (24 Jul 2022 21:31)    I&O's Summary    25 Jul 2022 07:01  -  25 Jul 2022 17:38  --------------------------------------------------------  IN: 0 mL / OUT: 0 mL / NET: 0 mL        PHYSICAL EXAM:  GENERAL: NAD, well-developed  HEAD:  Atraumatic, Normocephalic  EYES: EOMI, PERRLA, conjunctiva and sclera clear  NECK: Supple, No JVD  CHEST/LUNG: Clear to auscultation bilaterally; No wheeze  HEART: Regular rate and rhythm; No murmurs, rubs, or gallops  ABDOMEN: Soft, Nontender, Nondistended; Bowel sounds present  EXTREMITIES:  2+ Peripheral Pulses, No clubbing, cyanosis, or edema  PSYCH: AAOx3  NEUROLOGY: non-focal  SKIN: No rashes or lesions    LABS:                        15.2   14.95 )-----------( 374      ( 24 Jul 2022 06:15 )             47.1     07-25    141  |  107  |  52<H>  ----------------------------<  75  4.2   |  23  |  1.06    Ca    8.8      25 Jul 2022 05:46      PT/INR - ( 24 Jul 2022 06:15 )   PT: 10.1 sec;   INR: 0.87 ratio         PTT - ( 24 Jul 2022 06:15 )  PTT:25.7 sec    < from: MR Head w/wo IV Cont (07.20.22 @ 17:41) >  INTERPRETATION:  CLINICAL INFORMATION: intracranial metastases. MMR.   Admitting Dxs: G93.89 ABNORMAL MRI OF BRAIN.    TECHNIQUE: Multiplanar, multisequence brain MRI was performed following   the administration of 7 ml Gadavist intravenous contrast with 0.5 ml   discarded.    COMPARISON: CT head dated 7/18/2022.    FINDINGS:    Right temporal lobe peripherally enhancing, centrally necrotic 3.1 x 2.6   cm mass demonstrating mild diffusion restriction. There is surrounding   vasogenic edema. 8 mm leftward midline shift. Right uncal herniation.   Additional left parafalcine 1.3 x 0.9 cm rim-enhancing cystic lesion with   mild surrounding vasogenic edema. Tiny 0.3 x 0.3 cm rim-enhancing lesion   within the left frontal cortex (10:125).    Scattered foci of T2/FLAIR hyperintensity in the bilateral hemispheric   white matter are nonspecific but likely related to chronic white matter   microvascular ischemic disease. The left lateral ventricle is mildly   dilated. Flow voids of the major intracranial vessels at the skull base   follow expected course and contour.    The paranasal sinuses demonstrate no signal abnormality. The mastoids   demonstrate no signal abnormality.  Status post bilateral intraocular   lens implants.      IMPRESSION:  Right temporal lobe peripherally enhancing, centrally necrotic 3.1 x 2.6   cm mass demonstrating mild diffusion restriction. There is surrounding   vasogenic edema. 8 mm leftward midline shift. Right uncal herniation.   Additional left parafalcine 1.3 x 0.9 cm rim-enhancing cystic lesion with   mild surrounding vasogenic edema. Tiny 0.3 x 0.3 cm rim-enhancing lesion   within the left frontal cortex. Findings are concerning for metastatic   disease in the context of patient's known primary lung cancer. The left   lateral ventricle is mildly dilated.    < end of copied text >        RADIOLOGY & ADDITIONAL TESTS:    Imaging Personally Reviewed:    Consultant(s) Notes Reviewed:      Care Discussed with Consultants/Other Providers:

## 2022-07-25 NOTE — PROGRESS NOTE ADULT - PROBLEM SELECTOR PLAN 1
Will decrease Lantus to 30u at bedtime.  Will continue 16/12/16 units before each meal as well as coverage scale. Will continue monitoring FS and FU, will adjust dose as steroids are tapered/dc.  Patient counseled for compliance with consistent low carb diet and exercise as tolerated outpatient. Will adjust Lantus to 36u at bedtime.  Will continue 16/12/16 units before each meal as well as coverage scale. Will continue monitoring FS and FU, will adjust dose as steroids are tapered/dc.  Patient counseled for compliance with consistent low carb diet and exercise as tolerated outpatient.

## 2022-07-26 ENCOUNTER — TRANSCRIPTION ENCOUNTER (OUTPATIENT)
Age: 82
End: 2022-07-26

## 2022-07-26 LAB
ALDOST SERPL-MCNC: 10.5 NG/DL — SIGNIFICANT CHANGE UP
ANION GAP SERPL CALC-SCNC: 15 MMOL/L — SIGNIFICANT CHANGE UP (ref 5–17)
BLD GP AB SCN SERPL QL: NEGATIVE — SIGNIFICANT CHANGE UP
BUN SERPL-MCNC: 56 MG/DL — HIGH (ref 7–23)
CALCIUM SERPL-MCNC: 9.5 MG/DL — SIGNIFICANT CHANGE UP (ref 8.4–10.5)
CHLORIDE SERPL-SCNC: 105 MMOL/L — SIGNIFICANT CHANGE UP (ref 96–108)
CO2 SERPL-SCNC: 24 MMOL/L — SIGNIFICANT CHANGE UP (ref 22–31)
CREAT SERPL-MCNC: 1.18 MG/DL — SIGNIFICANT CHANGE UP (ref 0.5–1.3)
EGFR: 46 ML/MIN/1.73M2 — LOW
GLUCOSE BLDC GLUCOMTR-MCNC: 105 MG/DL — HIGH (ref 70–99)
GLUCOSE BLDC GLUCOMTR-MCNC: 149 MG/DL — HIGH (ref 70–99)
GLUCOSE BLDC GLUCOMTR-MCNC: 262 MG/DL — HIGH (ref 70–99)
GLUCOSE BLDC GLUCOMTR-MCNC: 80 MG/DL — SIGNIFICANT CHANGE UP (ref 70–99)
GLUCOSE SERPL-MCNC: 56 MG/DL — LOW (ref 70–99)
HCT VFR BLD CALC: 40.4 % — SIGNIFICANT CHANGE UP (ref 34.5–45)
HGB BLD-MCNC: 13.3 G/DL — SIGNIFICANT CHANGE UP (ref 11.5–15.5)
MCHC RBC-ENTMCNC: 31.3 PG — SIGNIFICANT CHANGE UP (ref 27–34)
MCHC RBC-ENTMCNC: 32.9 GM/DL — SIGNIFICANT CHANGE UP (ref 32–36)
MCV RBC AUTO: 95.1 FL — SIGNIFICANT CHANGE UP (ref 80–100)
NON-GYNECOLOGICAL CYTOLOGY STUDY: SIGNIFICANT CHANGE UP
NRBC # BLD: 0 /100 WBCS — SIGNIFICANT CHANGE UP (ref 0–0)
PLATELET # BLD AUTO: 329 K/UL — SIGNIFICANT CHANGE UP (ref 150–400)
POTASSIUM SERPL-MCNC: 3.4 MMOL/L — LOW (ref 3.5–5.3)
POTASSIUM SERPL-SCNC: 3.4 MMOL/L — LOW (ref 3.5–5.3)
RBC # BLD: 4.25 M/UL — SIGNIFICANT CHANGE UP (ref 3.8–5.2)
RBC # FLD: 12.3 % — SIGNIFICANT CHANGE UP (ref 10.3–14.5)
RH IG SCN BLD-IMP: POSITIVE — SIGNIFICANT CHANGE UP
SODIUM SERPL-SCNC: 144 MMOL/L — SIGNIFICANT CHANGE UP (ref 135–145)
WBC # BLD: 16.53 K/UL — HIGH (ref 3.8–10.5)
WBC # FLD AUTO: 16.53 K/UL — HIGH (ref 3.8–10.5)

## 2022-07-26 PROCEDURE — 95720 EEG PHY/QHP EA INCR W/VEEG: CPT

## 2022-07-26 RX ORDER — INSULIN GLARGINE 100 [IU]/ML
30 INJECTION, SOLUTION SUBCUTANEOUS AT BEDTIME
Refills: 0 | Status: DISCONTINUED | OUTPATIENT
Start: 2022-07-26 | End: 2022-07-26

## 2022-07-26 RX ORDER — POTASSIUM CHLORIDE 20 MEQ
40 PACKET (EA) ORAL EVERY 4 HOURS
Refills: 0 | Status: COMPLETED | OUTPATIENT
Start: 2022-07-26 | End: 2022-07-26

## 2022-07-26 RX ORDER — METOPROLOL TARTRATE 50 MG
25 TABLET ORAL
Refills: 0 | Status: DISCONTINUED | OUTPATIENT
Start: 2022-07-26 | End: 2022-07-27

## 2022-07-26 RX ORDER — INSULIN GLARGINE 100 [IU]/ML
20 INJECTION, SOLUTION SUBCUTANEOUS AT BEDTIME
Refills: 0 | Status: DISCONTINUED | OUTPATIENT
Start: 2022-07-26 | End: 2022-07-27

## 2022-07-26 RX ADMIN — Medication 16 UNIT(S): at 17:49

## 2022-07-26 RX ADMIN — Medication 4 MILLIGRAM(S): at 00:06

## 2022-07-26 RX ADMIN — Medication 0: at 21:40

## 2022-07-26 RX ADMIN — CHLORHEXIDINE GLUCONATE 1 APPLICATION(S): 213 SOLUTION TOPICAL at 12:09

## 2022-07-26 RX ADMIN — GABAPENTIN 200 MILLIGRAM(S): 400 CAPSULE ORAL at 21:39

## 2022-07-26 RX ADMIN — Medication 40 MILLIEQUIVALENT(S): at 14:22

## 2022-07-26 RX ADMIN — SODIUM CHLORIDE 500 MILLILITER(S): 9 INJECTION INTRAMUSCULAR; INTRAVENOUS; SUBCUTANEOUS at 00:10

## 2022-07-26 RX ADMIN — Medication 4 MILLIGRAM(S): at 05:09

## 2022-07-26 RX ADMIN — LEVETIRACETAM 500 MILLIGRAM(S): 250 TABLET, FILM COATED ORAL at 17:49

## 2022-07-26 RX ADMIN — INSULIN GLARGINE 20 UNIT(S): 100 INJECTION, SOLUTION SUBCUTANEOUS at 21:39

## 2022-07-26 RX ADMIN — Medication 3 MILLIGRAM(S): at 21:40

## 2022-07-26 RX ADMIN — Medication 12 UNIT(S): at 12:23

## 2022-07-26 RX ADMIN — Medication 4 MILLIGRAM(S): at 21:39

## 2022-07-26 RX ADMIN — LEVETIRACETAM 500 MILLIGRAM(S): 250 TABLET, FILM COATED ORAL at 05:09

## 2022-07-26 RX ADMIN — Medication 4 MILLIGRAM(S): at 12:09

## 2022-07-26 RX ADMIN — Medication 50 MILLIGRAM(S): at 05:10

## 2022-07-26 RX ADMIN — Medication 40 MILLIEQUIVALENT(S): at 17:48

## 2022-07-26 RX ADMIN — Medication 4 MILLIGRAM(S): at 17:49

## 2022-07-26 RX ADMIN — Medication 6: at 12:23

## 2022-07-26 RX ADMIN — LOSARTAN POTASSIUM 100 MILLIGRAM(S): 100 TABLET, FILM COATED ORAL at 05:10

## 2022-07-26 RX ADMIN — ATORVASTATIN CALCIUM 40 MILLIGRAM(S): 80 TABLET, FILM COATED ORAL at 21:39

## 2022-07-26 RX ADMIN — Medication 25 MILLIGRAM(S): at 05:10

## 2022-07-26 NOTE — PROGRESS NOTE ADULT - SUBJECTIVE AND OBJECTIVE BOX
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; ataxia; frontal lobe syndrome    INTERVAL HISTORY: s/p bronchoscopy with endobronchial brushings/biopsies of an obstructing left upper lobe endobronchial lesion -> preliminary pathology c/w poorly differentiated cancer; no shortness of breath or hypoxemia on room air; no cough, sputum production, hemoptysis, chest congestion or wheeze; no fevers, chills or sweats; no chest pain/pressure or palpitations; ongoing decreased inhibition, pressured speech and ataxia; neurosurgery planned for tomorrow    REVIEW OF SYSTEMS:  Constitutional: As per interval history  HEENT: Within normal limits  CV: As per interval history  Resp: As per interval history  GI: Within normal limits   : Within normal limits  Musculoskeletal: Within normal limits  Skin: Within normal limits  Neurological: ataxia - pressured speech  Psychiatric: Within normal limits  Endocrine: Within normal limits  Hematologic/Lymphatic: lung cancer  Allergic/Immunologic: Within normal limits    MEDICATIONS:     Pulmonary "    Anti-microbials:    Cardiovascular:  hydrochlorothiazide 25 milliGRAM(s) Oral daily  losartan 100 milliGRAM(s) Oral daily  metoprolol tartrate 50 milliGRAM(s) Oral three times a day    Other:  atorvastatin 40 milliGRAM(s) Oral at bedtime  chlorhexidine 2% Cloths 1 Application(s) Topical daily  dexAMETHasone     Tablet 4 milliGRAM(s) Oral every 6 hours  dextrose 5%. 1000 milliLiter(s) IV Continuous <Continuous>  dextrose 5%. 1000 milliLiter(s) IV Continuous <Continuous>  dextrose 50% Injectable 25 Gram(s) IV Push once  dextrose 50% Injectable 12.5 Gram(s) IV Push once  dextrose 50% Injectable 25 Gram(s) IV Push once  gabapentin 200 milliGRAM(s) Oral at bedtime  glucagon  Injectable 1 milliGRAM(s) IntraMuscular once  insulin glargine Injectable (LANTUS) 30 Unit(s) SubCutaneous at bedtime  insulin lispro (ADMELOG) corrective regimen sliding scale   SubCutaneous three times a day before meals  insulin lispro (ADMELOG) corrective regimen sliding scale   SubCutaneous at bedtime  insulin lispro Injectable (ADMELOG) 12 Unit(s) SubCutaneous before lunch  insulin lispro Injectable (ADMELOG) 16 Unit(s) SubCutaneous before dinner  insulin lispro Injectable (ADMELOG) 16 Unit(s) SubCutaneous before breakfast  levETIRAcetam 500 milliGRAM(s) Oral two times a day  melatonin 3 milliGRAM(s) Oral at bedtime  potassium chloride   Powder 40 milliEquivalent(s) Oral every 4 hours  sodium chloride 0.9%. 1000 milliLiter(s) IV Continuous <Continuous>    MEDICATIONS  (PRN):  dextrose Oral Gel 15 Gram(s) Oral once PRN Blood Glucose LESS THAN 70 milliGRAM(s)/deciliter    OBJECTIVE:    POCT Blood Glucose.: 262 mg/dL (26 Jul 2022 12:13)  POCT Blood Glucose.: 80 mg/dL (26 Jul 2022 08:09)  POCT Blood Glucose.: 132 mg/dL (25 Jul 2022 21:16)  POCT Blood Glucose.: 117 mg/dL (25 Jul 2022 16:58)      PHYSICAL EXAM:       ICU Vital Signs Last 24 Hrs  T(C): 36.5 (26 Jul 2022 00:09), Max: 36.8 (25 Jul 2022 16:27)  T(F): 97.7 (26 Jul 2022 00:09), Max: 98.3 (25 Jul 2022 16:27)  HR: 74 (26 Jul 2022 05:00) (54 - 74)  BP: 117/54 (26 Jul 2022 05:00) (79/54 - 125/60)  BP(mean): --  ABP: --  ABP(mean): --  RR: 17 (26 Jul 2022 05:00) (17 - 18)  SpO2: 94% (26 Jul 2022 05:00) (94% - 99%) on room air    General: Awake. Alert. Cooperative. No distress. Appears stated age. Loquacious. Pressured speech.	  HEENT:  Atraumatic. Normocephalic. Anicteric. Normal oral mucosa. PERRL. EOMI.  Neck: Supple. Trachea midline. Thyroid without enlargement/tenderness/nodules. No carotid bruit. No JVD.	  Cardiovascular: Regular rate and rhythm. S1 S2 normal. No murmurs, rubs or gallops.  Respiratory: Respirations unlabored. Clear to auscultation and percussion bilaterally. No curvature.  Abdomen: Soft. Non-tender. Non-distended. No organomegaly. No masses. Normal bowel sounds.  Extremities: Warm to touch. No clubbing or cyanosis. No pedal edema.  Pulses: 2+ peripheral pulses all extremities.	  Skin: Normal skin color. No rashes or lesions. No ecchymoses. No cyanosis. Warm to touch.  Lymph Nodes: Cervical, supraclavicular and axillary nodes normal  Neurological: Ataxic gait. A and O x 3  Psychiatry: Appropriate mood and affect.      LABS:                          13.3   16.53 )-----------( 329      ( 26 Jul 2022 06:51 )             40.4     CBC    WBC  16.53 <==, 14.95 <==, 15.12 <==, 11.97 <==    Hemoglobin  13.3 <<==, 15.2 <<==, 13.6 <<==, 13.3 <<==    Hematocrit  40.4 <==, 47.1 <==, 41.8 <==, 41.5 <==    Platelets  329 <==, 374 <==, 417 <==, 329 <==      144  |  105  |  56<H>  ----------------------------<  56<L>    07-26  3.4<L>   |  24  |  1.18      LYTES    sodium  144 <==, 141 <==, 142 <==, 138 <==, 140 <==    potassium   3.4 <==, 4.2 <==, 3.3 <==, 4.1 <==, 4.7 <==    chloride  105 <==, 107 <==, 102 <==, 100 <==, 104 <==    carbon dioxide  24 <==, 23 <==, 26 <==, 23 <==, 22 <==    =============================================================================================  RENAL FUNCTION:    Creatinine:   1.18  <<==, 1.06  <<==, 1.08  <<==, 0.98  <<==, 1.08  <<==    BUN:   56 <==, 52 <==, 38 <==, 38 <==, 36 <==    ============================================================================================    calcium   9.5 <==, 8.8 <==, 9.6 <==, 9.8 <==, 9.8 <==    phos   3.3 <==, 3.4 <==    mag   2.2 <==, 1.5 <==    ============================================================================================  PT/INR - ( 24 Jul 2022 06:15 )   PT: 10.1 sec;   INR: 0.87 ratio      PTT - ( 24 Jul 2022 06:15 )  PTT:25.7 sec    < from: TTE with Doppler (w/Cont) (07.22.22 @ 10:09) >    Patient name: LANIE BERTRAND  YOB: 1940   Age: 82 (F)   MR#: [id number]  Study Date: 7/22/2022  Location: [address] [place], [place] [id number]: Yelena Louis RDCS  Study quality: Technically difficult  Referring Physician: Mk Garza MD  BloodPressure: 116/72 mmHg  Height: 165 cm  Weight: 73 kg  BSA: 1.8 m2  ------------------------------------------------------------------------  PROCEDURE: Transthoracic echocardiogram with 2-D, M-Mode  and complete spectral and color flow Doppler.  INDICATION: Encounter for other preprocedural examination  (Z01.818)  ------------------------------------------------------------------------  Dimensions:    Normal Values:  LA:     3.4    2.0 - 4.0 cm  Ao:     3.1    2.0 - 3.8 cm  SEPTUM: 1.0    0.6 - 1.2 cm  PWT:    0.8    0.6 - 1.1 cm  LVIDd:  4.8    3.0 - 5.6 cm  LVIDs:  3.7    1.8 - 4.0 cm  Derived variables:  LVMI: 82 g/m2  RWT: 0.33  EF (Visual Estimate): 55 %  ------------------------------------------------------------------------  Observations:  Mitral Valve: Mitral annular calcification.  Aortic Valve/Aorta: Normal aortic valve.  Normal aortic root size.  Left Atrium: Normal left atrium.  Left Ventricle: Endocardial visualization enhanced with  intravenous injection of Ultrasonic Enhancing Agent  (Lumason).  Normal left ventricular internal dimensions with discrete  upper septal hypertrophy.  Estimated ejection fraction 55%, The inferolateral wall is  akinetic.  Impaired LV-relaxation with normal filling pressure.  Right Heart: Normal right atrium. Normal right ventricular  size and function.  Normal tricuspid valve.  Normal pulmonic valve. Mild pulmonic regurgitation.  Pericardium/Pleura: Normal pericardium with no pericardial  effusion.  Hemodynamic: Estimated right atrial pressure is normal.  No evidence of pulmonary hypertension.  ------------------------------------------------------------------------  Conclusions:  Endocardial visualization enhanced with intravenous  injection of Ultrasonic Enhancing Agent (Lumason).  Normal left ventricular internal dimensions with discrete  upper septal hypertrophy.  Estimated ejection fraction 55%, The inferolateral wall is  akinetic.  ------------------------------------------------------------------------  Confirmed on  7/22/2022 - 14:42:07 by David Vicente MD, FASE  ------------------------------------------------------------------------  ---------------------------------------------------------------------------------------------------------------    MICROBIOLOGY:   COVID-19 PCR . (07.18.22 @ 23:25)   COVID-19 PCR: NotDete    Culture - Bronchial (07.20.22 @ 16:52)   Culture Results:   Normal Respiratory Tamy present     Culture - Acid Fast - Bronchial w/Smear (07.20.22 @ 16:52)   Specimen Source: .Bronchial Bronchial Lavage   Acid Fast Bacilli Smear:   No acid fast bacilli seen by fluorochrome stain     RADIOLOGY:  [x] Chest radiographs reviewed and interpreted by me    EXAM:  CT ABDOMEN AND PELVIS IC                        EXAM:  CT CHEST IC                          PROCEDURE DATE:  07/18/2022      FINDINGS:  CHEST:  LUNGS AND LARGE AIRWAYS: There is a heterogeneous left upper lobe mass   approximately measuring 3.3 x 2.6 cm (2, 28) obstructing the left apical   posterior bronchus with partial atelectasis of the left upper lobe. Few   scattered bilateral pulmonary nodules measuring up to 3 mm in the right   upper lobe (2, 39).  PLEURA: No pleural effusion.  VESSELS: Atherosclerotic changes of the aorta and coronary arteries.  HEART: Heart size is normal. No pericardial effusion.  MEDIASTINUM AND SIXTO: Soft tissue contiguous with mass versus adenopathy   measuring 1.7 x 1.5 cm (2, 33).  CHEST WALL AND LOWER NECK: Bilateral subcentimeter hypoattenuating   thyroid nodules.    ABDOMEN AND PELVIS:  LIVER: Within normal limits.  BILE DUCTS: Normal caliber.  GALLBLADDER: Within normal limits.  SPLEEN: Within normal limits.  PANCREAS: Within normal limits.  ADRENALS: Indeterminant left adrenal nodule measuring 1.2 cm.  KIDNEYS/URETERS: No hydronephrosis. Renal cysts and subcentimeter   hypoattenuating foci bilaterally, too small to characterize.    BLADDER: Within normal limits.  REPRODUCTIVE ORGANS: Uterus and adnexa within normal limits.    BOWEL: Colonic diverticula withoutevidence of acute diverticulitis. No   bowel obstruction. Appendix is normal.  PERITONEUM: No ascites.  VESSELS: Atherosclerotic changes.  RETROPERITONEUM/LYMPH NODES: No lymphadenopathy.  ABDOMINAL WALL: Within normal limits.  BONES: Degenerative changes. Grade 1 anterolisthesis of L5 on S1. Status   post right shoulder arthroplasty.    IMPRESSION:  Left upper lobe lung mass with partial atelectasis of the left upper   lobe, concerning for primary lung neoplasm. Few scattered sub-4 mm   pulmonary nodules, indeterminate.    Indeterminant left adrenal nodule for which contrast enhanced MRI is   recommended for further evaluation.    DEEPAK HARRINGTON MD; Resident Radiologist  This document has been electronically signed.  HAYLEY DENTON MD; Attending Radiologist  This document has been electronically signed. Jul 19 2022  9:02AM  ---------------------------------------------------------------------------------------------------------------  EXAM:  CT BRAIN                          PROCEDURE DATE:  07/18/2022      FINDINGS:  A cystic versus centrally necrotic mass in the right temporal lobe   measures approximately 4.8 x 3.2 x 3.2 cm (602:15 and 601:33).    There moderate tolarge amount of vasogenic edema in the right frontal,   parietal and temporal lobes.  There is regional mass effect in the right   cerebral hemisphere with partial effacement of the right lateral   ventricle and third ventricle.  There is medial bulging of the right   thalamus across the midline by up to 1.4 cm (2:17).  There is   approximately 5 mm subfalcine subfalcine herniation of the right frontal   lobe underneath the cerebral falx (2:21).    There is an approximately 1.4 x 1.3 x 1.6 cm cystic versus centimeter   necrotic mass in the medial left frontal lobe.  There appears to be trace   edema surrounding this lesion, without significant mass effect.    There is no evidence of acute intracranial hemorrhage, transtentorial   herniation or acute territorial infarct.  Mild prominence the left   lateral ventricle may be related to pre-existing cerebral volume loss   rather than hydrocephalus.    The paranasal sinuses and mastoids are grossly clear.    The patient is status post cataractlens placement surgery bilaterally.    The calvarium and skull base appear within normal limits.    IMPRESSION:  Cystic versus a necrotic masses measure approximately 4.8 x 3.2 x 3.2 cm   in the right temporal lobe and 1.4 x 1.3 x 1.6 cm in the medial left   frontal lobe.    Moderate to large amount of vasogenic edema in the right frontal,   parietal and temporal lobes.  Trace edema in the left frontal lobe.    Regional mass effect in the right cerebral hemisphere with partial   effacement of theventricular system and 5 mm subfalcine herniation of   the right frontal lobe underneath the cerebral falx.    The findings may represent intracranial metastatic disease versus   glioblastoma.  Contrast-enhanced brain MRI is recommended for further   evaluation.      Findings discussed with DAVID Albarran by Dr. Harrington at 2153 hours on   7/8/2022 with readback confirmation.    DEEPAK HARRINGTON MD; Resident Radiologist  This document has been electronically signed.  PHILLY CISSE MD; Attending Radiologist  This document has been electronically signed. Jul 18 2022 10:15PM  ---------------------------------------------------------------------------------------------------------------  EXAM:  MR BRAIN WAW IC                          PROCEDURE DATE:  07/20/2022      FINDINGS:    Right temporal lobe peripherally enhancing, centrally necrotic 3.1 x 2.6   cm mass demonstrating mild diffusion restriction. There is surrounding   vasogenic edema. 8 mm leftward midline shift. Right uncal herniation.   Additional left parafalcine 1.3 x 0.9 cm rim-enhancing cystic lesion with   mild surrounding vasogenic edema. Tiny 0.3 x 0.3 cm rim-enhancing lesion   within the left frontal cortex (10:125).    Scattered foci of T2/FLAIR hyperintensity in the bilateral hemispheric   white matter are nonspecific but likely related to chronic white matter   microvascular ischemic disease. The left lateral ventricle is mildly   dilated. Flow voids of the major intracranial vessels at the skull base   follow expected course and contour.    The paranasal sinuses demonstrate no signal abnormality. The mastoids   demonstrate no signal abnormality.  Status post bilateral intraocular   lens implants.      IMPRESSION:  Right temporal lobe peripherally enhancing, centrally necrotic 3.1 x 2.6   cm mass demonstrating mild diffusion restriction. There is surrounding   vasogenic edema. 8 mm leftward midline shift. Right uncal herniation.   Additional left parafalcine 1.3 x 0.9 cm rim-enhancing cystic lesion with   mild surrounding vasogenic edema. Tiny 0.3 x 0.3 cm rim-enhancing lesion   within the left frontal cortex. Findings are concerning for metastatic   disease in the context of patient's known primary lung cancer. The left   lateral ventricle is mildly dilated.    NEIL DIOP MD; Resident Radiologist  This document has been electronically signed.  NERI BARRERA MD; Attending Radiologist  This document has been electronically signed. Jul 21 2022  4:26PM  ---------------------------------------------------------------------------------------------------------------  EXAM:  DUPLEX SCAN EXT VEINS LOWER BI                          PROCEDURE DATE:  07/24/2022      IMPRESSION:  No evidence of deep venous thrombosis in either lower extremity.    TISH VIDAL MD; Attending Radiologist  This document has been electronically signed. Jul 24 2022 11:19AM  ---------------------------------------------------------------------------------------------------------------

## 2022-07-26 NOTE — PROGRESS NOTE ADULT - SUBJECTIVE AND OBJECTIVE BOX
Patient seen and examined at bedside.    --Anticoagulation--    T(C): 36.5 (07-26-22 @ 00:09), Max: 36.8 (07-25-22 @ 16:27)  HR: 74 (07-26-22 @ 05:00) (54 - 74)  BP: 117/54 (07-26-22 @ 05:00) (79/54 - 125/60)  RR: 17 (07-26-22 @ 05:00) (17 - 18)  SpO2: 94% (07-26-22 @ 05:00) (94% - 99%)  Wt(kg): --    Exam: AOx3, PERRL, EOMI, no drift, no facial droop, DE LA GARZA 5/5, SILT

## 2022-07-26 NOTE — PROGRESS NOTE ADULT - ASSESSMENT
Assessment  DMT2: 82y Female with DM T2 with hyperglycemia, A1C 8.4%, was on oral meds and insulin at home, now on basal bolus insulin, adjusted dose, bolus dose held this morning 2/2 hypoglycemic episode and refusing breakfast, FS in otherwise acceptable range. Patient appears comfortable in NAD, eating meals with inconsistent intake, remains on same-dose dexamethasone. Planning OR for neurosurgery tomorrow.  Brain Mass: on meds, monitored, FU Neurosurgery.  Lung Mass: s/p bronchoscopy, CA workup in progress, monitored.  Thyroid Nodules: seen on imaging, B/L subcentimeter thyroid nodules, she denies neck mass/pain/dysphagia, euthyroid, reports known history and is being followed by Dr. Richardson.  Adrenal Nodule: seen on imaging, 1.2 cm left thyroid nodule, unknown history. AM cortisol suppressed 2/2 current steroid use, remaining labs pending..        Troy Thibodeaux MD  [phone]

## 2022-07-26 NOTE — PROGRESS NOTE ADULT - ASSESSMENT
ASSESSMENT:    82 year old gentlewoman, former smoker, without history of intrinsic lung disease. She has a history of HTN, HLD, DM, CAD s/p PCI (1990s and 2021) and PAD s/p RLE stenting. The patient has been having difficulty ambulating over the last several months due to a sense of imbalance with impaired gait. Her family has noted a change in her personality with pressured speech and insomnia. Outpatient cervical MRI incidentally note a CNS lesion. Lumbar spine MRI performed due to chronic back pain revealed multilevel foraminal stenosis/disc bulges. 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 The patient has no shortness of breath or hypoxemia on room air. She has no cough, sputum production, chest congestion or wheeze. No fevers, chills or sweats. No chest pain/pressure or palpitations.     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 presenting with several months of ataxia - frontal lobe lesions have lead to decreased inhibition as well as to pressured and loquacious speech      PLAN/RECOMMENDATIONS:    stable oxygenation on room air  spirometry    FEV1 - 1.81 liters - 88% predicted    FVC - 2.48 liters - 90% predicted    FEV1% - 73       c/w normal spiromatery  no indication for pulmonary medications or antibiotics  s/p bronchoscopy -> preliminary pathology -> poorly differentiated carcinoma -> special stains are pending  neurosurgery evaluation noted    neurosurgery for tumor debulking is scheduled for tomorrow    MRI -> right temporal lobe peripherally enhancing, centrally necrotic 3.1 x 2.6 cm mass demonstrating mild diffusion restriction - there is surrounding vasogenic edema - 8 mm leftward midline shift right uncal herniation - additional left parafalcine 1.3 x 0.9 cm rim-enhancing cystic lesion with mild surrounding vasogenic edema - tiny 0.3 x 0.3 cm rim-enhancing lesion within the left frontal cortex - findings are concerning for metastatic disease - the left lateral ventricle is mildly dilated    no anticoagulation, ASA or plavix    prophylactic anticonvulsants -> keppra    decadron 4mg q6h po  endocrinology evaluation noted    insulin regimen being adjusted for steroid induced hyperglycemia  radiation oncology, neurooncology, medical oncology evaluations noted  cardiology     ASA and plavix are currently on hold in anticipation of neurosurgery - patient had PCI last year    continue lipitor/lopressor/losartan/HCTZ    ECHO - normal LVEF - no significant valvular heart disease    Will follow with you. Plan of care discussed with the patient and her daughter-in-law by phone and with Crissy Garza and Ebonie. There is no pulmonary contraindication to the proposed neurosurgical procedure    Satish Hopkins MD, [facility]  [phone]  Pulmonary Medicine

## 2022-07-26 NOTE — PROGRESS NOTE ADULT - ASSESSMENT
[facility] 6/23/21:   s/p successful angioplasty to the mid RCA followed by intracoronary brachytherapy.  [facility] 4/14/21: POBA of mid RCA (80%)   [facility] 3/25/21: PCI/POBA to pCX 90%     a/p  81 y/o female (former smoker) with PMHx of HTN, CAD sp PCi,  HLD, MI, peripheral neuropathy, DM and peripheral artery disease presented with abnl imaging on MRI as outpt      #Brain/ Lung  mass   -CT head/ CT chest noted   -work up hem/onc   -neurosx fu noted MRI brain showed 2 mets R temporal 4.7x3.7x3cm L frontal 1.4x1.5x1.2cm   -s/p bronchoscopy  -Dex 4q6 per neuro sx   -pulm fu   -per neurosx -Tentatively plan for OR tomorrow  -pt is medically optimized for the OR from CV perspective    #CAD sp PCI   -resume ASA given PCI hx once cleared by IR/neurosx -- currently SP lung Bx  -ecg with no ischemic changes   -possible plan for tumor debulking  -echo w inferlat akinesis and overall preserved EF check echo for optimization for possible sx      dvt ppx     35 minutes spent on total encounter; more than 50% of the visit was spent counseling and/or coordinating care by the attending physician.

## 2022-07-26 NOTE — PROGRESS NOTE ADULT - SUBJECTIVE AND OBJECTIVE BOX
CARDIOLOGY FOLLOW UP - Dr. Alcaraz  Date of Service: 7/26/2022  CC: no cp/sob    Review of Systems:  Constitutional: No fever, weight loss, or fatigue  Respiratory: No cough, wheezing, or hemoptysis, no shortness of breath  Cardiovascular: No chest pain, palpitations, passing out, dizziness, or leg swelling  Gastrointestinal: No abd or epigastric pain. No nausea, vomiting, or hematemesis; no diarrhea or consiptaiton, no melena or hematochezia  Vascular: No edema     TELEMETRY:    PHYSICAL EXAM:  T(C): 36.5 (07-26-22 @ 00:09), Max: 36.8 (07-25-22 @ 16:27)  HR: 74 (07-26-22 @ 05:00) (54 - 74)  BP: 117/54 (07-26-22 @ 05:00) (79/54 - 125/60)  RR: 17 (07-26-22 @ 05:00) (17 - 18)  SpO2: 94% (07-26-22 @ 05:00) (94% - 99%)  Wt(kg): --  I&O's Summary    25 Jul 2022 07:01  -  26 Jul 2022 07:00  --------------------------------------------------------  IN: 490 mL / OUT: 0 mL / NET: 490 mL        Appearance: Normal	  Cardiovascular: Normal S1 S2,RRR, No JVD, No murmurs  Respiratory: Lungs clear to auscultation	  Gastrointestinal:  Soft, Non-tender, + BS	  Extremities: Normal range of motion, No clubbing, cyanosis or edema  Vascular: Peripheral pulses palpable 2+ bilaterally       Home Medications:  aspirin 81 mg oral tablet: 1 tab(s) orally once a day (19 Jul 2022 14:45)  gabapentin 100 mg oral capsule: 2 cap(s) orally once a day (at bedtime) (19 Jul 2022 14:45)  glimepiride 4 mg oral tablet: 1 tab(s) orally 2 times a day (19 Jul 2022 14:45)  losartan-hydrochlorothiazide 100 mg-25 mg oral tablet: 1 tab(s) orally once a day (19 Jul 2022 14:45)  metFORMIN 1000 mg oral tablet: 1 tab(s) orally 2 times a day   (19 Jul 2022 14:45)  metoprolol tartrate 50 mg oral tablet: 1 tab(s) orally 3 times a day (19 Jul 2022 14:45)  simvastatin 40 mg oral tablet: 1 tab(s) orally once a day (at bedtime) (19 Jul 2022 14:45)  T[place]elo Max SoloStar 300 units/mL subcutaneous solution: 6 unit(s) subcutaneous 2 times a day as directed (19 Jul 2022 14:45)  Victoza 18 mg/3 mL subcutaneous solution: 0.6 milligram(s) subcutaneous once a day (19 Jul 2022 14:45)        MEDICATIONS  (STANDING):  atorvastatin 40 milliGRAM(s) Oral at bedtime  chlorhexidine 2% Cloths 1 Application(s) Topical daily  dexAMETHasone     Tablet 4 milliGRAM(s) Oral every 6 hours  dextrose 5%. 1000 milliLiter(s) (50 mL/Hr) IV Continuous <Continuous>  dextrose 5%. 1000 milliLiter(s) (100 mL/Hr) IV Continuous <Continuous>  dextrose 50% Injectable 25 Gram(s) IV Push once  dextrose 50% Injectable 12.5 Gram(s) IV Push once  dextrose 50% Injectable 25 Gram(s) IV Push once  gabapentin 200 milliGRAM(s) Oral at bedtime  glucagon  Injectable 1 milliGRAM(s) IntraMuscular once  hydrochlorothiazide 25 milliGRAM(s) Oral daily  insulin glargine Injectable (LANTUS) 20 Unit(s) SubCutaneous at bedtime  insulin lispro (ADMELOG) corrective regimen sliding scale   SubCutaneous three times a day before meals  insulin lispro (ADMELOG) corrective regimen sliding scale   SubCutaneous at bedtime  insulin lispro Injectable (ADMELOG) 12 Unit(s) SubCutaneous before lunch  insulin lispro Injectable (ADMELOG) 16 Unit(s) SubCutaneous before dinner  insulin lispro Injectable (ADMELOG) 16 Unit(s) SubCutaneous before breakfast  levETIRAcetam 500 milliGRAM(s) Oral two times a day  losartan 100 milliGRAM(s) Oral daily  melatonin 3 milliGRAM(s) Oral at bedtime  metoprolol tartrate 50 milliGRAM(s) Oral three times a day  potassium chloride   Powder 40 milliEquivalent(s) Oral every 4 hours  sodium chloride 0.9%. 1000 milliLiter(s) (75 mL/Hr) IV Continuous <Continuous>        EKG:  RADIOLOGY:  DIAGNOSTIC TESTING:  [ ] Echocardiogram:  [ ] Catherterization:  [ ] Stress Test:  OTHER:     LABS:	 	                          13.3   16.53 )-----------( 329      ( 26 Jul 2022 06:51 )             40.4     07-26    144  |  105  |  56<H>  ----------------------------<  56<L>  3.4<L>   |  24  |  1.18    Ca    9.5      26 Jul 2022 06:51            CARDIAC MARKERS:

## 2022-07-26 NOTE — PROGRESS NOTE ADULT - PROBLEM SELECTOR PLAN 1
Will be NPO for OR tomorrow, Will adjust Lantus to be 20u tonight.  Will continue 16/12/16 units before each meal as well as coverage scale. Will continue monitoring FS and FU, will adjust dose as steroids are tapered/dc.  Discussed plan with patient, counseled for compliance with consistent low carb diet and exercise as tolerated outpatient.

## 2022-07-26 NOTE — PROGRESS NOTE ADULT - SUBJECTIVE AND OBJECTIVE BOX
Chief complaint  Patient is a 82y old  Female who presents with a chief complaint of abnormal imaging (26 Jul 2022 08:43)   Review of systems  Patient awake in bed, looks comfortable, had hypoglycemia this morning.    Labs and Fingersticks  CAPILLARY BLOOD GLUCOSE      POCT Blood Glucose.: 262 mg/dL (26 Jul 2022 12:13)  POCT Blood Glucose.: 80 mg/dL (26 Jul 2022 08:09)  POCT Blood Glucose.: 132 mg/dL (25 Jul 2022 21:16)  POCT Blood Glucose.: 117 mg/dL (25 Jul 2022 16:58)      Anion Gap, Serum: 15 (07-26 @ 06:51)  Anion Gap, Serum: 11 (07-25 @ 05:46)      Calcium, Total Serum: 9.5 (07-26 @ 06:51)  Calcium, Total Serum: 8.8 (07-25 @ 05:46)          07-26    144  |  105  |  56<H>  ----------------------------<  56<L>  3.4<L>   |  24  |  1.18    Ca    9.5      26 Jul 2022 06:51                          13.3   16.53 )-----------( 329      ( 26 Jul 2022 06:51 )             40.4     Medications  MEDICATIONS  (STANDING):  atorvastatin 40 milliGRAM(s) Oral at bedtime  chlorhexidine 2% Cloths 1 Application(s) Topical daily  dexAMETHasone     Tablet 4 milliGRAM(s) Oral every 6 hours  dextrose 5%. 1000 milliLiter(s) (50 mL/Hr) IV Continuous <Continuous>  dextrose 5%. 1000 milliLiter(s) (100 mL/Hr) IV Continuous <Continuous>  dextrose 50% Injectable 25 Gram(s) IV Push once  dextrose 50% Injectable 12.5 Gram(s) IV Push once  dextrose 50% Injectable 25 Gram(s) IV Push once  gabapentin 200 milliGRAM(s) Oral at bedtime  glucagon  Injectable 1 milliGRAM(s) IntraMuscular once  hydrochlorothiazide 25 milliGRAM(s) Oral daily  insulin glargine Injectable (LANTUS) 30 Unit(s) SubCutaneous at bedtime  insulin lispro (ADMELOG) corrective regimen sliding scale   SubCutaneous three times a day before meals  insulin lispro (ADMELOG) corrective regimen sliding scale   SubCutaneous at bedtime  insulin lispro Injectable (ADMELOG) 12 Unit(s) SubCutaneous before lunch  insulin lispro Injectable (ADMELOG) 16 Unit(s) SubCutaneous before dinner  insulin lispro Injectable (ADMELOG) 16 Unit(s) SubCutaneous before breakfast  levETIRAcetam 500 milliGRAM(s) Oral two times a day  losartan 100 milliGRAM(s) Oral daily  melatonin 3 milliGRAM(s) Oral at bedtime  metoprolol tartrate 50 milliGRAM(s) Oral three times a day  potassium chloride   Powder 40 milliEquivalent(s) Oral every 4 hours  sodium chloride 0.9%. 1000 milliLiter(s) (75 mL/Hr) IV Continuous <Continuous>      Physical Exam  General: Patient comfortable in bed  Vital Signs Last 12 Hrs  T(F): --  HR: 74 (07-26-22 @ 05:00) (74 - 74)  BP: 117/54 (07-26-22 @ 05:00) (117/54 - 117/54)  BP(mean): --  RR: 17 (07-26-22 @ 05:00) (17 - 17)  SpO2: 94% (07-26-22 @ 05:00) (94% - 94%)  Neck: No palpable thyroid nodules.  CVS: S1S2, No murmurs  Respiratory: No wheezing, no crepitations  GI: Abdomen soft, bowel sounds positive  Musculoskeletal:  edema lower extremities.   Skin: No skin rashes, no ecchymosis    Diagnostics    Metanephrine, Plasma: AM Sched. Collection: 21-Jul-2022 06:00 (07-20 @ 10:54)  Aldosterone, Serum: AM Sched. Collection: 21-Jul-2022 06:00 (07-20 @ 10:54)  Cortisol AM, Serum: AM Sched. Collection: 21-Jul-2022 06:00 (07-20 @ 10:54)

## 2022-07-26 NOTE — PROGRESS NOTE ADULT - SUBJECTIVE AND OBJECTIVE BOX
DATE OF SERVICE: 07-26-22 @ 14:22    Patient is a 82y old  Female who presents with a chief complaint of abnormal imaging (26 Jul 2022 14:15)      SUBJECTIVE / OVERNIGHT EVENTS:  No chest pain. No shortness of breath. No complaints. No events overnight.     MEDICATIONS  (STANDING):  atorvastatin 40 milliGRAM(s) Oral at bedtime  chlorhexidine 2% Cloths 1 Application(s) Topical daily  dexAMETHasone     Tablet 4 milliGRAM(s) Oral every 6 hours  dextrose 5%. 1000 milliLiter(s) (50 mL/Hr) IV Continuous <Continuous>  dextrose 5%. 1000 milliLiter(s) (100 mL/Hr) IV Continuous <Continuous>  dextrose 50% Injectable 25 Gram(s) IV Push once  dextrose 50% Injectable 12.5 Gram(s) IV Push once  dextrose 50% Injectable 25 Gram(s) IV Push once  gabapentin 200 milliGRAM(s) Oral at bedtime  glucagon  Injectable 1 milliGRAM(s) IntraMuscular once  hydrochlorothiazide 25 milliGRAM(s) Oral daily  insulin glargine Injectable (LANTUS) 20 Unit(s) SubCutaneous at bedtime  insulin lispro (ADMELOG) corrective regimen sliding scale   SubCutaneous three times a day before meals  insulin lispro (ADMELOG) corrective regimen sliding scale   SubCutaneous at bedtime  insulin lispro Injectable (ADMELOG) 12 Unit(s) SubCutaneous before lunch  insulin lispro Injectable (ADMELOG) 16 Unit(s) SubCutaneous before dinner  insulin lispro Injectable (ADMELOG) 16 Unit(s) SubCutaneous before breakfast  levETIRAcetam 500 milliGRAM(s) Oral two times a day  losartan 100 milliGRAM(s) Oral daily  melatonin 3 milliGRAM(s) Oral at bedtime  metoprolol tartrate 50 milliGRAM(s) Oral three times a day  potassium chloride   Powder 40 milliEquivalent(s) Oral every 4 hours  sodium chloride 0.9%. 1000 milliLiter(s) (75 mL/Hr) IV Continuous <Continuous>    MEDICATIONS  (PRN):  dextrose Oral Gel 15 Gram(s) Oral once PRN Blood Glucose LESS THAN 70 milliGRAM(s)/deciliter      Vital Signs Last 24 Hrs  T(C): 36.5 (26 Jul 2022 13:18), Max: 36.8 (25 Jul 2022 16:27)  T(F): 97.7 (26 Jul 2022 13:18), Max: 98.3 (25 Jul 2022 16:27)  HR: 74 (26 Jul 2022 05:00) (54 - 74)  BP: 145/58 (26 Jul 2022 13:18) (79/54 - 145/58)  BP(mean): --  RR: 17 (26 Jul 2022 13:18) (17 - 18)  SpO2: 100% (26 Jul 2022 13:18) (94% - 100%)    Parameters below as of 26 Jul 2022 13:18  Patient On (Oxygen Delivery Method): room air      CAPILLARY BLOOD GLUCOSE      POCT Blood Glucose.: 262 mg/dL (26 Jul 2022 12:13)  POCT Blood Glucose.: 80 mg/dL (26 Jul 2022 08:09)  POCT Blood Glucose.: 132 mg/dL (25 Jul 2022 21:16)  POCT Blood Glucose.: 117 mg/dL (25 Jul 2022 16:58)    I&O's Summary    25 Jul 2022 07:01  -  26 Jul 2022 07:00  --------------------------------------------------------  IN: 490 mL / OUT: 0 mL / NET: 490 mL    26 Jul 2022 07:01  -  26 Jul 2022 14:22  --------------------------------------------------------  IN: 480 mL / OUT: 0 mL / NET: 480 mL        PHYSICAL EXAM:  GENERAL: NAD, well-developed  HEAD:  Atraumatic, Normocephalic  EYES: EOMI, PERRLA, conjunctiva and sclera clear  NECK: Supple, No JVD  CHEST/LUNG: Clear to auscultation bilaterally; No wheeze  HEART: Regular rate and rhythm; No murmurs, rubs, or gallops  ABDOMEN: Soft, Nontender, Nondistended; Bowel sounds present  EXTREMITIES:  2+ Peripheral Pulses, No clubbing, cyanosis, or edema  PSYCH: AAOx3  NEUROLOGY: non-focal  SKIN: No rashes or lesions    LABS:                        13.3   16.53 )-----------( 329      ( 26 Jul 2022 06:51 )             40.4     07-26    144  |  105  |  56<H>  ----------------------------<  56<L>  3.4<L>   |  24  |  1.18    Ca    9.5      26 Jul 2022 06:51                RADIOLOGY & ADDITIONAL TESTS:    Imaging Personally Reviewed:    Consultant(s) Notes Reviewed:      Care Discussed with Consultants/Other Providers:

## 2022-07-26 NOTE — PROGRESS NOTE ADULT - SUBJECTIVE AND OBJECTIVE BOX
LANIE BERTRAND  MRN-[id number]    Patient is a 82y old  Female who presents with a chief complaint of abnormal imaging (26 Jul 2022 14:22)      Review of System  REVIEW OF SYSTEMS      General:	Denies fatigue, fevers, chills, sweats, decreased appetite.    Skin/Breast: denies pruritis, rash  	  Ophthalmologic: no change in vision or blurring  	  HEENT	Denies dry mouth, oral sores, dysphagia,  change in hearing.    Respiratory and Thorax:  cough, sob, wheeze, hemoptysis  	  Cardiovascular:	no cp , palp, orthopnea    Gastrointestinal:	no n/v/d constipation    Genitourinary:	no dysuria of frequency, no hematuria, no flank pain    Musculoskeletal:	no bone or joint pain. no muscle aches.     Neurological:	no change in sensory or motor function. no headache. no weakness.     Psychiatric:	no depression, no anxiety, insomnia.     Hematology/Lymphatics:	no bleeding or bruising        Current Meds  MEDICATIONS  (STANDING):  atorvastatin 40 milliGRAM(s) Oral at bedtime  chlorhexidine 2% Cloths 1 Application(s) Topical daily  dexAMETHasone     Tablet 4 milliGRAM(s) Oral every 6 hours  dextrose 5%. 1000 milliLiter(s) (50 mL/Hr) IV Continuous <Continuous>  dextrose 5%. 1000 milliLiter(s) (100 mL/Hr) IV Continuous <Continuous>  dextrose 50% Injectable 25 Gram(s) IV Push once  dextrose 50% Injectable 12.5 Gram(s) IV Push once  dextrose 50% Injectable 25 Gram(s) IV Push once  gabapentin 200 milliGRAM(s) Oral at bedtime  glucagon  Injectable 1 milliGRAM(s) IntraMuscular once  hydrochlorothiazide 25 milliGRAM(s) Oral daily  insulin glargine Injectable (LANTUS) 20 Unit(s) SubCutaneous at bedtime  insulin lispro (ADMELOG) corrective regimen sliding scale   SubCutaneous three times a day before meals  insulin lispro (ADMELOG) corrective regimen sliding scale   SubCutaneous at bedtime  insulin lispro Injectable (ADMELOG) 12 Unit(s) SubCutaneous before lunch  insulin lispro Injectable (ADMELOG) 16 Unit(s) SubCutaneous before dinner  insulin lispro Injectable (ADMELOG) 16 Unit(s) SubCutaneous before breakfast  levETIRAcetam 500 milliGRAM(s) Oral two times a day  losartan 100 milliGRAM(s) Oral daily  melatonin 3 milliGRAM(s) Oral at bedtime  metoprolol tartrate 50 milliGRAM(s) Oral three times a day  potassium chloride   Powder 40 milliEquivalent(s) Oral every 4 hours  sodium chloride 0.9%. 1000 milliLiter(s) (75 mL/Hr) IV Continuous <Continuous>    MEDICATIONS  (PRN):  dextrose Oral Gel 15 Gram(s) Oral once PRN Blood Glucose LESS THAN 70 milliGRAM(s)/deciliter      Vitals  Vital Signs Last 24 Hrs  T(C): 36.9 (26 Jul 2022 16:28), Max: 36.9 (26 Jul 2022 16:28)  T(F): 98.5 (26 Jul 2022 16:28), Max: 98.5 (26 Jul 2022 16:28)  HR: 53 (26 Jul 2022 16:28) (53 - 74)  BP: 120/63 (26 Jul 2022 16:28) (79/54 - 145/58)  BP(mean): --  RR: 18 (26 Jul 2022 16:28) (17 - 18)  SpO2: 99% (26 Jul 2022 16:28) (94% - 100%)    Parameters below as of 26 Jul 2022 16:28  Patient On (Oxygen Delivery Method): room air        Physical Exam  PHYSICAL EXAM:      Constitutional: NAD    Eyes: PERRLA EOMI, anicteric sclera    Heent :No oral sores, no pharyngeal injection. moist mucosa.    Neck: supple, no jvd, no LAD    Respiratory: CTA b/l     Cardiovascular: s1s2, no m/g/r    Gastrointestinal: soft, nt, nd, + BS    Extremities: no c/c/e    Neurological:A&O x 3 moves all ext.    Skin: no rash on exposed skin    Lymph Nodes: no lymphadenopathy.              Lab  CBC Full  -  ( 26 Jul 2022 06:51 )  WBC Count : 16.53 K/uL  RBC Count : 4.25 M/uL  Hemoglobin : 13.3 g/dL  Hematocrit : 40.4 %  Platelet Count - Automated : 329 K/uL  Mean Cell Volume : 95.1 fl  Mean Cell Hemoglobin : 31.3 pg  Mean Cell Hemoglobin Concentration : 32.9 gm/dL  Auto Neutrophil # : x  Auto Lymphocyte # : x  Auto Monocyte # : x  Auto Eosinophil # : x  Auto Basophil # : x  Auto Neutrophil % : x  Auto Lymphocyte % : x  Auto Monocyte % : x  Auto Eosinophil % : x  Auto Basophil % : x    07-26    144  |  105  |  56<H>  ----------------------------<  56<L>  3.4<L>   |  24  |  1.18    Ca    9.5      26 Jul 2022 06:51          Rad:    Assessment/Plan   LANIE BERTRAND  MRN-[id number]    Patient is a 82y old  Female who presents with a chief complaint of abnormal imaging (26 Jul 2022 14:22)      Review of System    Feels well.  Family at bedside    General:	Denies fatigue, fevers, chills, sweats, decreased appetite.    Skin/Breast: denies pruritis, rash  	  Ophthalmologic: no change in vision or blurring  	  HEENT	Denies dry mouth, oral sores, dysphagia,  change in hearing.    Respiratory and Thorax:  cough, sob, wheeze, hemoptysis  	  Cardiovascular:	no cp , palp, orthopnea    Gastrointestinal:	no n/v/d constipation    Genitourinary:	no dysuria of frequency, no hematuria, no flank pain    Musculoskeletal:	no bone or joint pain. no muscle aches.     Neurological:	no change in sensory or motor function. no headache. no weakness.     Psychiatric:	no depression, no anxiety, insomnia.     Hematology/Lymphatics:	no bleeding or bruising    Current Meds  MEDICATIONS  (STANDING):  atorvastatin 40 milliGRAM(s) Oral at bedtime  chlorhexidine 2% Cloths 1 Application(s) Topical daily  dexAMETHasone     Tablet 4 milliGRAM(s) Oral every 6 hours  dextrose 5%. 1000 milliLiter(s) (50 mL/Hr) IV Continuous <Continuous>  dextrose 5%. 1000 milliLiter(s) (100 mL/Hr) IV Continuous <Continuous>  dextrose 50% Injectable 25 Gram(s) IV Push once  dextrose 50% Injectable 12.5 Gram(s) IV Push once  dextrose 50% Injectable 25 Gram(s) IV Push once  gabapentin 200 milliGRAM(s) Oral at bedtime  glucagon  Injectable 1 milliGRAM(s) IntraMuscular once  hydrochlorothiazide 25 milliGRAM(s) Oral daily  insulin glargine Injectable (LANTUS) 20 Unit(s) SubCutaneous at bedtime  insulin lispro (ADMELOG) corrective regimen sliding scale   SubCutaneous three times a day before meals  insulin lispro (ADMELOG) corrective regimen sliding scale   SubCutaneous at bedtime  insulin lispro Injectable (ADMELOG) 12 Unit(s) SubCutaneous before lunch  insulin lispro Injectable (ADMELOG) 16 Unit(s) SubCutaneous before dinner  insulin lispro Injectable (ADMELOG) 16 Unit(s) SubCutaneous before breakfast  levETIRAcetam 500 milliGRAM(s) Oral two times a day  losartan 100 milliGRAM(s) Oral daily  melatonin 3 milliGRAM(s) Oral at bedtime  metoprolol tartrate 50 milliGRAM(s) Oral three times a day  potassium chloride   Powder 40 milliEquivalent(s) Oral every 4 hours  sodium chloride 0.9%. 1000 milliLiter(s) (75 mL/Hr) IV Continuous <Continuous>    MEDICATIONS  (PRN):  dextrose Oral Gel 15 Gram(s) Oral once PRN Blood Glucose LESS THAN 70 milliGRAM(s)/deciliter    Vital Signs Last 24 Hrs  T(C): 36.9 (26 Jul 2022 16:28), Max: 36.9 (26 Jul 2022 16:28)  T(F): 98.5 (26 Jul 2022 16:28), Max: 98.5 (26 Jul 2022 16:28)  HR: 53 (26 Jul 2022 16:28) (53 - 74)  BP: 120/63 (26 Jul 2022 16:28) (79/54 - 145/58)  BP(mean): --  RR: 18 (26 Jul 2022 16:28) (17 - 18)  SpO2: 99% (26 Jul 2022 16:28) (94% - 100%)    Parameters below as of 26 Jul 2022 16:28  Patient On (Oxygen Delivery Method): room air      PHYSICAL EXAM:    Constitutional: NAD    Eyes: PERRLA EOMI, anicteric sclera    Heent :No oral sores, no pharyngeal injection. moist mucosa.    Neck: supple, no jvd, no LAD    Respiratory: CTA b/l     Cardiovascular: s1s2, no m/g/r    Gastrointestinal: soft, nt, nd, + BS    Extremities: no c/c/e    Neurological:A&O x 3 moves all ext.    Skin: no rash on exposed skin    Lymph Nodes: no lymphadenopathy.              Lab  CBC Full  -  ( 26 Jul 2022 06:51 )  WBC Count : 16.53 K/uL  RBC Count : 4.25 M/uL  Hemoglobin : 13.3 g/dL  Hematocrit : 40.4 %  Platelet Count - Automated : 329 K/uL  Mean Cell Volume : 95.1 fl  Mean Cell Hemoglobin : 31.3 pg  Mean Cell Hemoglobin Concentration : 32.9 gm/dL  Auto Neutrophil # : x  Auto Lymphocyte # : x  Auto Monocyte # : x  Auto Eosinophil # : x  Auto Basophil # : x  Auto Neutrophil % : x  Auto Lymphocyte % : x  Auto Monocyte % : x  Auto Eosinophil % : x  Auto Basophil % : x    07-26    144  |  105  |  56<H>  ----------------------------<  56<L>  3.4<L>   |  24  |  1.18    Ca    9.5      26 Jul 2022 06:51          Rad:    Assessment/Plan

## 2022-07-26 NOTE — PROGRESS NOTE ADULT - ASSESSMENT
82F mult med hx (no cancer hx) inc "stent in R leg" 2-3y ago on ASA/plavix, was called by her orthopedic surgeon, Dr. Alin Castro, telling her to come to ED 4d ago for abnormal MRI C-spine (7/14) c/f intracranial pathology. Pt waited until today to come in b/c feels fine. Reports balance/gait issues since April. CTH showing 4.8x3.2x3.2cm R temp mass and 1.4x1.3x1.6cm L frontal mass w/ vasogenic edema in mult lobes; ~8mm MLS. Also got MR L spine (7/14) form LBP radiating to legs showing multilevel foraminal stenosis/disc bulges; L5-S1 grade 1 spondy. Exam: AOx3, PERRL, EOMI, no facial, no drift, DE LA GARZA 5/5, SILT, no alfredo, no clonus.  -Adm Medicine, q4h neurochecks  -MRI brain showed 2 mets R temporal 4.7x3.7x3cm L frontal 1.4x1.5x1.2cm   -Pending bronchoscopy ANTHONY mass 7/20, pending final Path results  -Rad Onc, Med Onc, Neuro-onc consult  -Keppra 500bid  -Hold AC/AP  -Dex 4q6  -Plan for OR Wednesday 7/27

## 2022-07-26 NOTE — PROGRESS NOTE ADULT - ASSESSMENT
81 y/o female (former smoker) with PMHx of HTN, CAD, HLD, MI, peripheral neuropathy, DM and peripheral artery disease presented to the ED for imaging for her head due to abnormal spine MRI by her orthopedist (Dr. Castro) during workup for her spine for her chronic back problems. The orthopedist noted something in her brain. Otherwise pt has no symptoms, stated that since 04/2022 she has had gait/balance issues. Endorsed that if she sits for a while and gets up, she feels like she is "drunk" and feels off. Denied chest pain, SOB, fevers, chills, numbness/paresthesias, muscular weakness, dizziness, abdominal pain, headaches.    brain mass  - likely metastatic lung cancer  -  mri done  - decadron  - keppra  - seen by NS  - for poss resection tentatively scheduled for wednesday  - pt has no medical contraindications for the planned procedure    lung mass  - pulm following  - bronch was done  - follow up path    diabetes  - hold oral hypoglycemics  - insulin ss  - hgb a1c  8.7   - elevated sugars are expected  - endo consult following    HTN  -  cont home meds    cad  - s/p stent  - hold asa and plavix for poss brain surgery    dvt px  - scd's

## 2022-07-26 NOTE — PROGRESS NOTE ADULT - ASSESSMENT
1) Lung and brain masses likely a metastatic process.  Lung bx c/w metastatic adenocarcinoma  - NSGY planned   patient has also been seen by rad/Onc. Getting steroids and keppra.   Further med onc mgmt as outpt  will work on having pathology sent for foundation testing.     Thrombocytosis , now resolved.

## 2022-07-27 ENCOUNTER — TRANSCRIPTION ENCOUNTER (OUTPATIENT)
Age: 82
End: 2022-07-27

## 2022-07-27 ENCOUNTER — APPOINTMENT (OUTPATIENT)
Dept: NEUROSURGERY | Facility: HOSPITAL | Age: 82
End: 2022-07-27

## 2022-07-27 ENCOUNTER — RESULT REVIEW (OUTPATIENT)
Age: 82
End: 2022-07-27

## 2022-07-27 LAB
ALBUMIN SERPL ELPH-MCNC: 3.7 G/DL — SIGNIFICANT CHANGE UP (ref 3.3–5)
ALP SERPL-CCNC: 76 U/L — SIGNIFICANT CHANGE UP (ref 40–120)
ALT FLD-CCNC: 21 U/L — SIGNIFICANT CHANGE UP (ref 10–45)
ANION GAP SERPL CALC-SCNC: 12 MMOL/L — SIGNIFICANT CHANGE UP (ref 5–17)
ANION GAP SERPL CALC-SCNC: 17 MMOL/L — SIGNIFICANT CHANGE UP (ref 5–17)
AST SERPL-CCNC: 14 U/L — SIGNIFICANT CHANGE UP (ref 10–40)
BILIRUB SERPL-MCNC: 0.2 MG/DL — SIGNIFICANT CHANGE UP (ref 0.2–1.2)
BUN SERPL-MCNC: 38 MG/DL — HIGH (ref 7–23)
BUN SERPL-MCNC: 43 MG/DL — HIGH (ref 7–23)
CALCIUM SERPL-MCNC: 9.1 MG/DL — SIGNIFICANT CHANGE UP (ref 8.4–10.5)
CALCIUM SERPL-MCNC: 9.1 MG/DL — SIGNIFICANT CHANGE UP (ref 8.4–10.5)
CHLORIDE SERPL-SCNC: 103 MMOL/L — SIGNIFICANT CHANGE UP (ref 96–108)
CHLORIDE SERPL-SCNC: 110 MMOL/L — HIGH (ref 96–108)
CO2 SERPL-SCNC: 18 MMOL/L — LOW (ref 22–31)
CO2 SERPL-SCNC: 20 MMOL/L — LOW (ref 22–31)
CREAT SERPL-MCNC: 0.93 MG/DL — SIGNIFICANT CHANGE UP (ref 0.5–1.3)
CREAT SERPL-MCNC: 0.93 MG/DL — SIGNIFICANT CHANGE UP (ref 0.5–1.3)
EGFR: 61 ML/MIN/1.73M2 — SIGNIFICANT CHANGE UP
EGFR: 61 ML/MIN/1.73M2 — SIGNIFICANT CHANGE UP
GAS PNL BLDA: SIGNIFICANT CHANGE UP
GLUCOSE BLDC GLUCOMTR-MCNC: 230 MG/DL — HIGH (ref 70–99)
GLUCOSE BLDC GLUCOMTR-MCNC: 265 MG/DL — HIGH (ref 70–99)
GLUCOSE BLDC GLUCOMTR-MCNC: 93 MG/DL — SIGNIFICANT CHANGE UP (ref 70–99)
GLUCOSE SERPL-MCNC: 253 MG/DL — HIGH (ref 70–99)
GLUCOSE SERPL-MCNC: 90 MG/DL — SIGNIFICANT CHANGE UP (ref 70–99)
HCT VFR BLD CALC: 38.8 % — SIGNIFICANT CHANGE UP (ref 34.5–45)
HCT VFR BLD CALC: 39.2 % — SIGNIFICANT CHANGE UP (ref 34.5–45)
HGB BLD-MCNC: 13.1 G/DL — SIGNIFICANT CHANGE UP (ref 11.5–15.5)
HGB BLD-MCNC: 13.4 G/DL — SIGNIFICANT CHANGE UP (ref 11.5–15.5)
INR BLD: 1 RATIO — SIGNIFICANT CHANGE UP (ref 0.88–1.16)
MCHC RBC-ENTMCNC: 31.4 PG — SIGNIFICANT CHANGE UP (ref 27–34)
MCHC RBC-ENTMCNC: 31.5 PG — SIGNIFICANT CHANGE UP (ref 27–34)
MCHC RBC-ENTMCNC: 33.8 GM/DL — SIGNIFICANT CHANGE UP (ref 32–36)
MCHC RBC-ENTMCNC: 34.2 GM/DL — SIGNIFICANT CHANGE UP (ref 32–36)
MCV RBC AUTO: 92 FL — SIGNIFICANT CHANGE UP (ref 80–100)
MCV RBC AUTO: 93 FL — SIGNIFICANT CHANGE UP (ref 80–100)
METANEPHRINE, PL: 17 PG/ML — SIGNIFICANT CHANGE UP (ref 0–88)
NORMETANEPHRINE, PL: 72.6 PG/ML — SIGNIFICANT CHANGE UP (ref 0–297.2)
NRBC # BLD: 0 /100 WBCS — SIGNIFICANT CHANGE UP (ref 0–0)
NRBC # BLD: 0 /100 WBCS — SIGNIFICANT CHANGE UP (ref 0–0)
PLATELET # BLD AUTO: 309 K/UL — SIGNIFICANT CHANGE UP (ref 150–400)
PLATELET # BLD AUTO: 342 K/UL — SIGNIFICANT CHANGE UP (ref 150–400)
POTASSIUM SERPL-MCNC: 4.1 MMOL/L — SIGNIFICANT CHANGE UP (ref 3.5–5.3)
POTASSIUM SERPL-MCNC: 4.1 MMOL/L — SIGNIFICANT CHANGE UP (ref 3.5–5.3)
POTASSIUM SERPL-SCNC: 4.1 MMOL/L — SIGNIFICANT CHANGE UP (ref 3.5–5.3)
POTASSIUM SERPL-SCNC: 4.1 MMOL/L — SIGNIFICANT CHANGE UP (ref 3.5–5.3)
PROT SERPL-MCNC: 6 G/DL — SIGNIFICANT CHANGE UP (ref 6–8.3)
PROTHROM AB SERPL-ACNC: 11.6 SEC — SIGNIFICANT CHANGE UP (ref 10.5–13.4)
RBC # BLD: 4.17 M/UL — SIGNIFICANT CHANGE UP (ref 3.8–5.2)
RBC # BLD: 4.26 M/UL — SIGNIFICANT CHANGE UP (ref 3.8–5.2)
RBC # FLD: 12.3 % — SIGNIFICANT CHANGE UP (ref 10.3–14.5)
RBC # FLD: 12.5 % — SIGNIFICANT CHANGE UP (ref 10.3–14.5)
SODIUM SERPL-SCNC: 138 MMOL/L — SIGNIFICANT CHANGE UP (ref 135–145)
SODIUM SERPL-SCNC: 142 MMOL/L — SIGNIFICANT CHANGE UP (ref 135–145)
WBC # BLD: 15.37 K/UL — HIGH (ref 3.8–10.5)
WBC # BLD: 21.83 K/UL — HIGH (ref 3.8–10.5)
WBC # FLD AUTO: 15.37 K/UL — HIGH (ref 3.8–10.5)
WBC # FLD AUTO: 21.83 K/UL — HIGH (ref 3.8–10.5)

## 2022-07-27 PROCEDURE — 61781 SCAN PROC CRANIAL INTRA: CPT

## 2022-07-27 PROCEDURE — 69990 MICROSURGERY ADD-ON: CPT | Mod: 59

## 2022-07-27 PROCEDURE — 88360 TUMOR IMMUNOHISTOCHEM/MANUAL: CPT | Mod: 26

## 2022-07-27 PROCEDURE — 88341 IMHCHEM/IMCYTCHM EA ADD ANTB: CPT | Mod: 26,59

## 2022-07-27 PROCEDURE — 61510 CRNEC TREPH EXC BRN TUM STTL: CPT

## 2022-07-27 PROCEDURE — 88342 IMHCHEM/IMCYTCHM 1ST ANTB: CPT | Mod: 26,59

## 2022-07-27 PROCEDURE — 88307 TISSUE EXAM BY PATHOLOGIST: CPT | Mod: 26

## 2022-07-27 PROCEDURE — 99291 CRITICAL CARE FIRST HOUR: CPT

## 2022-07-27 DEVICE — SCREW UN3 AXS SELF DRILL 1.5X4MM 5/PK: Type: IMPLANTABLE DEVICE | Site: RIGHT | Status: FUNCTIONAL

## 2022-07-27 DEVICE — PLATE BONE MICRO 10MM 2H: Type: IMPLANTABLE DEVICE | Site: RIGHT | Status: FUNCTIONAL

## 2022-07-27 DEVICE — SCREW 1.5X5MM: Type: IMPLANTABLE DEVICE | Site: RIGHT | Status: FUNCTIONAL

## 2022-07-27 DEVICE — MATRIX DURAGEN PLUS DURAL REGENERATION 3X3: Type: IMPLANTABLE DEVICE | Site: RIGHT | Status: FUNCTIONAL

## 2022-07-27 DEVICE — MAYFIELD SKULL PIN ADULT PLASTIC: Type: IMPLANTABLE DEVICE | Site: RIGHT | Status: FUNCTIONAL

## 2022-07-27 DEVICE — SURGIFOAM PAD 8CM X 12.5CM X 10MM (100): Type: IMPLANTABLE DEVICE | Site: RIGHT | Status: FUNCTIONAL

## 2022-07-27 DEVICE — GRAFT DURAL MATRX BOVINE 2.5X7.5CM: Type: IMPLANTABLE DEVICE | Site: RIGHT | Status: FUNCTIONAL

## 2022-07-27 DEVICE — SURGIFLO MATRIX WITH THROMBIN KIT: Type: IMPLANTABLE DEVICE | Site: RIGHT | Status: FUNCTIONAL

## 2022-07-27 DEVICE — SURGICEL FIBRILLAR 2 X 4": Type: IMPLANTABLE DEVICE | Site: RIGHT | Status: FUNCTIONAL

## 2022-07-27 DEVICE — PLATE COVER BURRHOLE UN3 W/TAB 14MM: Type: IMPLANTABLE DEVICE | Site: RIGHT | Status: FUNCTIONAL

## 2022-07-27 DEVICE — SURGICEL 2 X 14": Type: IMPLANTABLE DEVICE | Site: RIGHT | Status: FUNCTIONAL

## 2022-07-27 RX ORDER — OXYCODONE HYDROCHLORIDE 5 MG/1
10 TABLET ORAL EVERY 4 HOURS
Refills: 0 | Status: DISCONTINUED | OUTPATIENT
Start: 2022-07-27 | End: 2022-08-01

## 2022-07-27 RX ORDER — CHLORHEXIDINE GLUCONATE 213 G/1000ML
1 SOLUTION TOPICAL
Refills: 0 | Status: DISCONTINUED | OUTPATIENT
Start: 2022-07-27 | End: 2022-07-28

## 2022-07-27 RX ORDER — SODIUM CHLORIDE 9 MG/ML
1000 INJECTION, SOLUTION INTRAVENOUS
Refills: 0 | Status: DISCONTINUED | OUTPATIENT
Start: 2022-07-27 | End: 2022-07-27

## 2022-07-27 RX ORDER — SENNA PLUS 8.6 MG/1
2 TABLET ORAL AT BEDTIME
Refills: 0 | Status: DISCONTINUED | OUTPATIENT
Start: 2022-07-27 | End: 2022-08-04

## 2022-07-27 RX ORDER — AMLODIPINE BESYLATE 2.5 MG/1
10 TABLET ORAL ONCE
Refills: 0 | Status: COMPLETED | OUTPATIENT
Start: 2022-07-27 | End: 2022-07-27

## 2022-07-27 RX ORDER — SODIUM CHLORIDE 9 MG/ML
1000 INJECTION INTRAMUSCULAR; INTRAVENOUS; SUBCUTANEOUS
Refills: 0 | Status: DISCONTINUED | OUTPATIENT
Start: 2022-07-27 | End: 2022-07-28

## 2022-07-27 RX ORDER — ACETAMINOPHEN 500 MG
650 TABLET ORAL EVERY 6 HOURS
Refills: 0 | Status: DISCONTINUED | OUTPATIENT
Start: 2022-07-27 | End: 2022-08-01

## 2022-07-27 RX ORDER — DEXAMETHASONE 0.5 MG/5ML
4 ELIXIR ORAL EVERY 6 HOURS
Refills: 0 | Status: DISCONTINUED | OUTPATIENT
Start: 2022-07-27 | End: 2022-07-28

## 2022-07-27 RX ORDER — PANTOPRAZOLE SODIUM 20 MG/1
40 TABLET, DELAYED RELEASE ORAL DAILY
Refills: 0 | Status: DISCONTINUED | OUTPATIENT
Start: 2022-07-27 | End: 2022-07-28

## 2022-07-27 RX ORDER — INSULIN LISPRO 100/ML
VIAL (ML) SUBCUTANEOUS
Refills: 0 | Status: DISCONTINUED | OUTPATIENT
Start: 2022-07-27 | End: 2022-07-27

## 2022-07-27 RX ORDER — GABAPENTIN 400 MG/1
200 CAPSULE ORAL AT BEDTIME
Refills: 0 | Status: DISCONTINUED | OUTPATIENT
Start: 2022-07-27 | End: 2022-08-04

## 2022-07-27 RX ORDER — CEFAZOLIN SODIUM 1 G
2000 VIAL (EA) INJECTION EVERY 8 HOURS
Refills: 0 | Status: COMPLETED | OUTPATIENT
Start: 2022-07-27 | End: 2022-07-28

## 2022-07-27 RX ORDER — INSULIN LISPRO 100/ML
VIAL (ML) SUBCUTANEOUS
Refills: 0 | Status: DISCONTINUED | OUTPATIENT
Start: 2022-07-27 | End: 2022-08-04

## 2022-07-27 RX ORDER — LANOLIN ALCOHOL/MO/W.PET/CERES
3 CREAM (GRAM) TOPICAL AT BEDTIME
Refills: 0 | Status: DISCONTINUED | OUTPATIENT
Start: 2022-07-27 | End: 2022-07-27

## 2022-07-27 RX ORDER — OXYCODONE HYDROCHLORIDE 5 MG/1
5 TABLET ORAL EVERY 4 HOURS
Refills: 0 | Status: DISCONTINUED | OUTPATIENT
Start: 2022-07-27 | End: 2022-08-01

## 2022-07-27 RX ORDER — INSULIN GLARGINE 100 [IU]/ML
28 INJECTION, SOLUTION SUBCUTANEOUS AT BEDTIME
Refills: 0 | Status: DISCONTINUED | OUTPATIENT
Start: 2022-07-27 | End: 2022-07-27

## 2022-07-27 RX ORDER — LEVETIRACETAM 250 MG/1
500 TABLET, FILM COATED ORAL
Refills: 0 | Status: DISCONTINUED | OUTPATIENT
Start: 2022-07-27 | End: 2022-07-27

## 2022-07-27 RX ORDER — INSULIN GLARGINE 100 [IU]/ML
28 INJECTION, SOLUTION SUBCUTANEOUS AT BEDTIME
Refills: 0 | Status: DISCONTINUED | OUTPATIENT
Start: 2022-07-27 | End: 2022-07-28

## 2022-07-27 RX ORDER — INSULIN LISPRO 100/ML
12 VIAL (ML) SUBCUTANEOUS
Refills: 0 | Status: DISCONTINUED | OUTPATIENT
Start: 2022-07-27 | End: 2022-07-28

## 2022-07-27 RX ORDER — LOSARTAN POTASSIUM 100 MG/1
100 TABLET, FILM COATED ORAL DAILY
Refills: 0 | Status: DISCONTINUED | OUTPATIENT
Start: 2022-07-27 | End: 2022-08-04

## 2022-07-27 RX ORDER — DEXTROSE 50 % IN WATER 50 %
15 SYRINGE (ML) INTRAVENOUS ONCE
Refills: 0 | Status: DISCONTINUED | OUTPATIENT
Start: 2022-07-27 | End: 2022-07-27

## 2022-07-27 RX ORDER — DEXTROSE 50 % IN WATER 50 %
25 SYRINGE (ML) INTRAVENOUS ONCE
Refills: 0 | Status: DISCONTINUED | OUTPATIENT
Start: 2022-07-27 | End: 2022-08-01

## 2022-07-27 RX ORDER — ATORVASTATIN CALCIUM 80 MG/1
40 TABLET, FILM COATED ORAL AT BEDTIME
Refills: 0 | Status: DISCONTINUED | OUTPATIENT
Start: 2022-07-27 | End: 2022-08-01

## 2022-07-27 RX ORDER — METOPROLOL TARTRATE 50 MG
25 TABLET ORAL
Refills: 0 | Status: DISCONTINUED | OUTPATIENT
Start: 2022-07-27 | End: 2022-08-01

## 2022-07-27 RX ORDER — INSULIN LISPRO 100/ML
16 VIAL (ML) SUBCUTANEOUS
Refills: 0 | Status: DISCONTINUED | OUTPATIENT
Start: 2022-07-27 | End: 2022-07-28

## 2022-07-27 RX ORDER — VALPROIC ACID (AS SODIUM SALT) 250 MG/5ML
500 SOLUTION, ORAL ORAL EVERY 8 HOURS
Refills: 0 | Status: DISCONTINUED | OUTPATIENT
Start: 2022-07-27 | End: 2022-07-28

## 2022-07-27 RX ORDER — GLUCAGON INJECTION, SOLUTION 0.5 MG/.1ML
1 INJECTION, SOLUTION SUBCUTANEOUS ONCE
Refills: 0 | Status: DISCONTINUED | OUTPATIENT
Start: 2022-07-27 | End: 2022-07-27

## 2022-07-27 RX ORDER — LOSARTAN POTASSIUM 100 MG/1
100 TABLET, FILM COATED ORAL ONCE
Refills: 0 | Status: COMPLETED | OUTPATIENT
Start: 2022-07-27 | End: 2022-07-27

## 2022-07-27 RX ORDER — POLYETHYLENE GLYCOL 3350 17 G/17G
17 POWDER, FOR SOLUTION ORAL DAILY
Refills: 0 | Status: DISCONTINUED | OUTPATIENT
Start: 2022-07-27 | End: 2022-07-28

## 2022-07-27 RX ORDER — HYDROCHLOROTHIAZIDE 25 MG
25 TABLET ORAL DAILY
Refills: 0 | Status: DISCONTINUED | OUTPATIENT
Start: 2022-07-27 | End: 2022-07-27

## 2022-07-27 RX ORDER — INSULIN LISPRO 100/ML
VIAL (ML) SUBCUTANEOUS AT BEDTIME
Refills: 0 | Status: DISCONTINUED | OUTPATIENT
Start: 2022-07-27 | End: 2022-07-27

## 2022-07-27 RX ORDER — ONDANSETRON 8 MG/1
4 TABLET, FILM COATED ORAL EVERY 6 HOURS
Refills: 0 | Status: DISCONTINUED | OUTPATIENT
Start: 2022-07-27 | End: 2022-08-01

## 2022-07-27 RX ORDER — DEXTROSE 50 % IN WATER 50 %
12.5 SYRINGE (ML) INTRAVENOUS ONCE
Refills: 0 | Status: DISCONTINUED | OUTPATIENT
Start: 2022-07-27 | End: 2022-08-01

## 2022-07-27 RX ORDER — NICARDIPINE HYDROCHLORIDE 30 MG/1
7.5 CAPSULE, EXTENDED RELEASE ORAL
Qty: 40 | Refills: 0 | Status: DISCONTINUED | OUTPATIENT
Start: 2022-07-27 | End: 2022-07-28

## 2022-07-27 RX ADMIN — Medication 6: at 23:08

## 2022-07-27 RX ADMIN — INSULIN GLARGINE 28 UNIT(S): 100 INJECTION, SOLUTION SUBCUTANEOUS at 23:09

## 2022-07-27 RX ADMIN — AMLODIPINE BESYLATE 10 MILLIGRAM(S): 2.5 TABLET ORAL at 23:09

## 2022-07-27 RX ADMIN — Medication 25 MILLIGRAM(S): at 05:30

## 2022-07-27 RX ADMIN — ATORVASTATIN CALCIUM 40 MILLIGRAM(S): 80 TABLET, FILM COATED ORAL at 23:00

## 2022-07-27 RX ADMIN — Medication 4 MILLIGRAM(S): at 05:30

## 2022-07-27 RX ADMIN — OXYCODONE HYDROCHLORIDE 5 MILLIGRAM(S): 5 TABLET ORAL at 22:30

## 2022-07-27 RX ADMIN — LOSARTAN POTASSIUM 100 MILLIGRAM(S): 100 TABLET, FILM COATED ORAL at 05:30

## 2022-07-27 RX ADMIN — OXYCODONE HYDROCHLORIDE 5 MILLIGRAM(S): 5 TABLET ORAL at 23:00

## 2022-07-27 RX ADMIN — SENNA PLUS 2 TABLET(S): 8.6 TABLET ORAL at 23:01

## 2022-07-27 RX ADMIN — Medication 100 MILLIGRAM(S): at 22:59

## 2022-07-27 RX ADMIN — Medication 4 MILLIGRAM(S): at 23:01

## 2022-07-27 RX ADMIN — GABAPENTIN 200 MILLIGRAM(S): 400 CAPSULE ORAL at 23:00

## 2022-07-27 RX ADMIN — LOSARTAN POTASSIUM 100 MILLIGRAM(S): 100 TABLET, FILM COATED ORAL at 22:58

## 2022-07-27 RX ADMIN — LEVETIRACETAM 500 MILLIGRAM(S): 250 TABLET, FILM COATED ORAL at 05:30

## 2022-07-27 NOTE — PROGRESS NOTE ADULT - SUBJECTIVE AND OBJECTIVE BOX
CARDIOLOGY FOLLOW UP - Dr. Alcaraz  Date of Service: 7/27/2022  CC: no cp/sob, OR planned for today    Review of Systems:  Constitutional: No fever, weight loss, or fatigue  Respiratory: No cough, wheezing, or hemoptysis, no shortness of breath  Cardiovascular: No chest pain, palpitations, passing out, dizziness, or leg swelling  Gastrointestinal: No abd or epigastric pain. No nausea, vomiting, or hematemesis; no diarrhea or consiptaiton, no melena or hematochezia  Vascular: No edema     TELEMETRY:    PHYSICAL EXAM:  T(C): 36.6 (07-27-22 @ 11:11), Max: 36.9 (07-26-22 @ 16:28)  HR: 70 (07-27-22 @ 11:11) (50 - 70)  BP: 116/49 (07-27-22 @ 11:11) (116/49 - 155/83)  RR: 18 (07-27-22 @ 11:11) (17 - 18)  SpO2: 100% (07-27-22 @ 11:11) (99% - 100%)  Wt(kg): --  I&O's Summary    26 Jul 2022 07:01  -  27 Jul 2022 07:00  --------------------------------------------------------  IN: 1245 mL / OUT: 0 mL / NET: 1245 mL    27 Jul 2022 07:01  -  27 Jul 2022 12:32  --------------------------------------------------------  IN: 0 mL / OUT: 0 mL / NET: 0 mL        Appearance: Normal	  Cardiovascular: Normal S1 S2,RRR, No JVD, No murmurs  Respiratory: Lungs clear to auscultation	  Gastrointestinal:  Soft, Non-tender, + BS	  Extremities: Normal range of motion, No clubbing, cyanosis or edema  Vascular: Peripheral pulses palpable 2+ bilaterally       Home Medications:  aspirin 81 mg oral tablet: 1 tab(s) orally once a day (19 Jul 2022 14:45)  gabapentin 100 mg oral capsule: 2 cap(s) orally once a day (at bedtime) (19 Jul 2022 14:45)  glimepiride 4 mg oral tablet: 1 tab(s) orally 2 times a day (19 Jul 2022 14:45)  losartan-hydrochlorothiazide 100 mg-25 mg oral tablet: 1 tab(s) orally once a day (19 Jul 2022 14:45)  metFORMIN 1000 mg oral tablet: 1 tab(s) orally 2 times a day   (19 Jul 2022 14:45)  metoprolol tartrate 50 mg oral tablet: 1 tab(s) orally 3 times a day (19 Jul 2022 14:45)  simvastatin 40 mg oral tablet: 1 tab(s) orally once a day (at bedtime) (19 Jul 2022 14:45)  Toujeo Max SoloStar 300 units/mL subcutaneous solution: 6 unit(s) subcutaneous 2 times a day as directed (19 Jul 2022 14:45)  Victoza 18 mg/3 mL subcutaneous solution: 0.6 milligram(s) subcutaneous once a day (19 Jul 2022 14:45)        MEDICATIONS  (STANDING):        EKG:  RADIOLOGY:  DIAGNOSTIC TESTING:  [ ] Echocardiogram:  [ ] Catherterization:  [ ] Stress Test:  OTHER:     LABS:	 	                          13.1   15.37 )-----------( 309      ( 27 Jul 2022 07:14 )             38.8     07-27    142  |  110<H>  |  43<H>  ----------------------------<  90  4.1   |  20<L>  |  0.93    Ca    9.1      27 Jul 2022 07:17        PT/INR - ( 27 Jul 2022 07:17 )   PT: 11.6 sec;   INR: 1.00 ratio             CARDIAC MARKERS:

## 2022-07-27 NOTE — PROGRESS NOTE ADULT - ASSESSMENT
ASSESSMENT:    82 year old gentlewoman, former smoker, without history of intrinsic lung disease. She has a history of HTN, HLD, DM, CAD s/p PCI (1990s and 2021) and PAD s/p RLE stenting. The patient has been having difficulty ambulating over the last several months due to a sense of imbalance with impaired gait. Her family has noted a change in her personality with pressured speech and insomnia. Outpatient cervical MRI incidentally note a CNS lesion. Lumbar spine MRI performed due to chronic back pain revealed multilevel foraminal stenosis/disc bulges. 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 The patient has no shortness of breath or hypoxemia on room air. She has no cough, sputum production, chest congestion or wheeze. No fevers, chills or sweats. No chest pain/pressure or palpitations.     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  neurosurgery evaluation noted    neurosurgery for tumor debulking is scheduled for today    MRI -> right temporal lobe peripherally enhancing, centrally necrotic 3.1 x 2.6 cm mass demonstrating mild diffusion restriction - there is surrounding vasogenic edema - 8 mm leftward midline shift right uncal herniation - additional left parafalcine 1.3 x 0.9 cm rim-enhancing cystic lesion with mild surrounding vasogenic edema - tiny 0.3 x 0.3 cm rim-enhancing lesion within the left frontal cortex - findings are concerning for metastatic disease - the left lateral ventricle is mildly dilated    no anticoagulation, ASA or plavix    prophylactic anticonvulsants -> keppra    decadron 4mg q6h po  endocrinology evaluation noted    insulin regimen being adjusted for steroid induced hyperglycemia  radiation oncology, neurooncology, medical oncology evaluations noted  cardiology     ASA and plavix are currently on hold in anticipation of neurosurgery - patient had PCI last year    continue lipitor/lopressor/losartan/HCTZ    ECHO - normal LVEF - no significant valvular heart disease    Will follow with you. Plan of care discussed with the patient and her daughter-in-law by phone and with Crissy Garza and Ebonie. There is no pulmonary contraindication to the proposed neurosurgical procedure    Satish Hopkins MD, [facility]  [phone]  Pulmonary Medicine

## 2022-07-27 NOTE — PROGRESS NOTE ADULT - ASSESSMENT
82F mult med hx (no cancer hx) inc "stent in R leg" 2-3y ago on ASA/plavix, was called by her orthopedic surgeon, Dr. Alin Castro, telling her to come to ED 4d ago for abnormal MRI C-spine (7/14) c/f intracranial pathology. Pt waited until today to come in b/c feels fine. Reports balance/gait issues since April. CTH showing 4.8x3.2x3.2cm R temp mass and 1.4x1.3x1.6cm L frontal mass w/ vasogenic edema in mult lobes; ~8mm MLS. Also got MR L spine (7/14) form LBP radiating to legs showing multilevel foraminal stenosis/disc bulges; L5-S1 grade 1 spondy. Exam: AOx3, PERRL, EOMI, no facial, no drift, DE LA GARZA 5/5, SILT, no alfredo, no clonus.    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

## 2022-07-27 NOTE — PROGRESS NOTE ADULT - ASSESSMENT
POD 0 R craniotomy for tumor resection, likely metastatic     NEURO:  CT Head in AM, MRI post-op, Pain control  decadron for cerebral edema  keppra for seizure ppx   Q1hr neurochecks   HOB 30 degrees   Activity: [x] OOB as tolerated [] Bedrest [x] PT [x] OT [] PMNR    PULM:  Incentive spirometry, mobilize as tolerated    CV:  -160mmHg, d/c a-line in AM  nicardipine gtt right now wean as able   losartan continue, add amlodipine     RENAL:  IVF until good PO intake, d/c bob in AM    GI:  Diet: Dysphagia screen and then advance diet as tolerated  GI prophylaxis [] not indicated [x] PPI [] other:  Bowel regimen [x] miralax [x] senna [] other:    ENDO:   Goal euglycemia (-180)  lantus and premeal insulin, endo following     HEME/ONC:  VTE prophylaxis: [x] SCDs [] chemoprophylaxis [x] hold chemoprophylaxis due to: fresh post op [x] high risk of DVT/PE on admission due to: tumor     ID:  Dali-op antibiotics  monitor for fevers  leukocytosis likely 2/2 steroid     MISC:    SOCIAL/FAMILY:  [] awaiting [x] updated at bedside [] family meeting    CODE STATUS:  [x] Full Code [] DNR [] DNI [] Palliative/Comfort Care    DISPOSITION:  [x] ICU [] Stroke Unit [] Floor [] EMU [] RCU [] PCU    [x] Patient is at high risk of neurologic deterioration/death due to: post op hemorrhage, cerebral edema, brain compression    Contact: [phone]

## 2022-07-27 NOTE — PROGRESS NOTE ADULT - SUBJECTIVE AND OBJECTIVE BOX
SUMMARY:  83 y/o woman (former smoker) adm 7/19 for abnormal brain imaging. PMHx of HTN, CAD, HLD, MI, peripheral neuropathy, DM and peripheral artery disease presented to the ED for imaging for her head due to abnormal spine MRI by her orthopedist (Dr. Castro) during workup for her spine for her chronic back problems. The orthopedist noted something in her brain. Otherwise pt has no symptoms, stated that since 04/2022 she has had gait/balance issues. Endorsed that if she sits for a while and gets up, she feels like she is "drunk" and feels off. Denied chest pain, SOB, fevers, chills, numbness/paresthesias, muscular weakness, dizziness, abdominal pain, headaches. CTH showing 4.8x3.2x3.2cm R temp mass and 1.4x1.3x1.6cm L frontal mass w/ vasogenic edema in mult lobes; ~8mm MLS. Also got MR L spine (7/14) form LBP radiating to legs showing multilevel foraminal stenosis/disc bulges; L5-S1 grade 1 spondy.    Adm NSCU s/p R craniotomy for tumor resection    ADMISSION SCORES:   GCS: 15 HH: MF: NIHSS: ICH Score:    REVIEW OF SYSTEMS: [] Unable to Assess due to neurologic exam   [ x] All ROS addressed below are non-contributory, except:  Neuro: [x ] Headache [ ] Back pain [ ] Numbness [ ] Weakness [ ] Ataxia [ ] Dizziness [ ] Aphasia [ ] Dysarthria [ ] Visual disturbance  Resp: [ ] Shortness of breath/dyspnea [ ] Orthopnea [ ] Cough  CV: [ ] Chest pain [ ] Palpitation [ ] Lightheadedness [ ] Syncope  Renal: [ ] Thirst [ ] Edema  GI: [ ] Nausea [ ] Emesis [ ] Abdominal pain [ ] Constipation [ ] Diarrhea  Hem: [ ] Hematemesis [ ] bBright red blood per rectum  ID: [ ] Fever [ ] Chills [ ] Dysuria  ENT: [ ] Rhinorrhea    VITALS: [x] Reviewed    IMAGING/DATA: [x] Reviewed    IVF FLUIDS/MEDICATIONS: [x] Reviewed    ALLERGIES: Allergies    adhesives (Pruritus)  No Known Drug Allergies    Intolerances    DEVICES:   [] Restraints [x] PIVs [] ET tube [] central line [] PICC [x] arterial line [x] bob [] NGT/OGT [] EVD [] LD [] TRICIA/HMV [] LiCOX [] ICP monitor [] Trach [] PEG [] Chest Tube [] other:    EXAMINATION:  General: distress due to headache   HEENT: Anicteric sclerae  Cardiac: [id number]  Lungs: Clear  Abdomen: Soft, non-tender, +BS  Extremities: No c/c/e  Skin/Incision Site: Clean, dry and intact  Neurologic: Awake, alert, fully oriented, follows commands, PERRL, EOMI, face symmetric, tongue midline, no drift, full strength SUMMARY:  83 y/o woman (former smoker) adm 7/19 for abnormal brain imaging. PMHx of HTN, CAD, HLD, MI, peripheral neuropathy, DM and peripheral artery disease presented to the ED for imaging for her head due to abnormal spine MRI by her orthopedist (Dr. Castro) during workup for her spine for her chronic back problems. The orthopedist noted something in her brain. Otherwise pt has no symptoms, stated that since 04/2022 she has had gait/balance issues. Endorsed that if she sits for a while and gets up, she feels like she is "drunk" and feels off. Denied chest pain, SOB, fevers, chills, numbness/paresthesias, muscular weakness, dizziness, abdominal pain, headaches. CTH showing 4.8x3.2x3.2cm R temp mass and 1.4x1.3x1.6cm L frontal mass w/ vasogenic edema in mult lobes; ~8mm MLS. Also got MR L spine (7/14) form LBP radiating to legs showing multilevel foraminal stenosis/disc bulges; L5-S1 grade 1 spondy.    Adm NSCU s/p R craniotomy for tumor resection    ADMISSION SCORES:   GCS: 15 HH: MF: NIHSS: ICH Score:    REVIEW OF SYSTEMS: [] Unable to Assess due to neurologic exam   [ x] All ROS addressed below are non-contributory, except:  Neuro: [x ] Headache [ ] Back pain [ ] Numbness [ ] Weakness [ ] Ataxia [ ] Dizziness [ ] Aphasia [ ] Dysarthria [ ] Visual disturbance  Resp: [ ] Shortness of breath/dyspnea [ ] Orthopnea [ ] Cough  CV: [ ] Chest pain [ ] Palpitation [ ] Lightheadedness [ ] Syncope  Renal: [ ] Thirst [ ] Edema  GI: [ ] Nausea [ ] Emesis [ ] Abdominal pain [ ] Constipation [ ] Diarrhea  Hem: [ ] Hematemesis [ ] bBright red blood per rectum  ID: [ ] Fever [ ] Chills [ ] Dysuria  ENT: [ ] Rhinorrhea    VITALS: [x] Reviewed    IMAGING/DATA: [x] Reviewed    IVF FLUIDS/MEDICATIONS: [x] Reviewed    ALLERGIES: Allergies    adhesives (Pruritus)  No Known Drug Allergies    Intolerances    DEVICES:   [] Restraints [x] PIVs [] ET tube [] central line [] PICC [x] arterial line [x] bob [] NGT/OGT [] EVD [] LD [] TRICIA/HMV [] LiCOX [] ICP monitor [] Trach [] PEG [] Chest Tube [] other:    EXAMINATION:  General: agitated    HEENT: Anicteric sclerae  Cardiac: [id number]  Lungs: Clear  Abdomen: Soft, non-tender, +BS  Extremities: No c/c/e  Skin/Incision Site: Clean, dry and intact  Neurologic: Awake, agitated, oriented x3, follows commands, PERRL, EOMI, face symmetric, tongue midline, no drift, full strength

## 2022-07-27 NOTE — PROGRESS NOTE ADULT - ASSESSMENT
[facility] 6/23/21:   s/p successful angioplasty to the mid RCA followed by intracoronary brachytherapy.  [facility] 4/14/21: POBA of mid RCA (80%)   [facility] 3/25/21: PCI/POBA to pCX 90%     a/p  83 y/o female (former smoker) with PMHx of HTN, CAD sp PCi,  HLD, MI, peripheral neuropathy, DM and peripheral artery disease presented with abnl imaging on MRI as outpt      #Brain/ Lung  mass   -CT head/ CT chest noted   -work up hem/onc   -neurosx fu noted MRI brain showed 2 mets R temporal 4.7x3.7x3cm L frontal 1.4x1.5x1.2cm   -s/p bronchoscopy  -pulm fu   -per neurosx -plan for OR today  -pt remains medically optimized for the OR from a CV perspective    #CAD sp PCI   -resume ASA given PCI hx once cleared by IR/neurosx -- currently SP lung Bx  -ecg with no ischemic changes   -echo w inferlat akinesis and overall preserved EF check echo for optimization for possible sx      dvt ppx     35 minutes spent on total encounter; more than 50% of the visit was spent counseling and/or coordinating care by the attending physician.

## 2022-07-27 NOTE — EEG REPORT - NS EEG TEXT BOX
LANIE BERTRAND [id number]     Study Date: 		07-26-22 17:04PM to 07-27-22 08:00AM  Duration x Hours: 14:34 hrs  --------------------------------------------------------------------------------------------------  History:  CC/ HPI Patient is a 82y old  Female who presents with a chief complaint of abnormal imaging (27 Jul 2022 08:13)    MEDICATIONS  (STANDING):  atorvastatin 40 milliGRAM(s) Oral at bedtime  chlorhexidine 2% Cloths 1 Application(s) Topical daily  dexAMETHasone     Tablet 4 milliGRAM(s) Oral every 6 hours  dextrose 5%. 1000 milliLiter(s) (50 mL/Hr) IV Continuous <Continuous>  dextrose 5%. 1000 milliLiter(s) (100 mL/Hr) IV Continuous <Continuous>  dextrose 50% Injectable 25 Gram(s) IV Push once  dextrose 50% Injectable 12.5 Gram(s) IV Push once  dextrose 50% Injectable 25 Gram(s) IV Push once  gabapentin 200 milliGRAM(s) Oral at bedtime  glucagon  Injectable 1 milliGRAM(s) IntraMuscular once  hydrochlorothiazide 25 milliGRAM(s) Oral daily  insulin glargine Injectable (LANTUS) 20 Unit(s) SubCutaneous at bedtime  insulin lispro (ADMELOG) corrective regimen sliding scale   SubCutaneous three times a day before meals  insulin lispro (ADMELOG) corrective regimen sliding scale   SubCutaneous at bedtime  insulin lispro Injectable (ADMELOG) 12 Unit(s) SubCutaneous before lunch  insulin lispro Injectable (ADMELOG) 16 Unit(s) SubCutaneous before dinner  insulin lispro Injectable (ADMELOG) 16 Unit(s) SubCutaneous before breakfast  levETIRAcetam 500 milliGRAM(s) Oral two times a day  losartan 100 milliGRAM(s) Oral daily  melatonin 3 milliGRAM(s) Oral at bedtime  metoprolol tartrate 25 milliGRAM(s) Oral two times a day  sodium chloride 0.9%. 1000 milliLiter(s) (75 mL/Hr) IV Continuous <Continuous>    --------------------------------------------------------------------------------------------------  Study Interpretation:    [[[Abbreviation Key:  PDR=alpha rhythm/posterior dominant rhythm. A-P=anterior posterior.  Amplitude: ‘very low’:<20; ‘low’:20-49; ‘medium’:; ‘high’:>150uV.  Persistence for periodic/rhythmic patterns (% of epoch) ‘rare’:<1%; ‘occasional’:1-10%; ‘frequent’:10-50%; ‘abundant’:50-90%; ‘continuous’:>90%.  Persistence for sporadic discharges: ‘rare’:<1/hr; ‘occasional’:1/min-1/hr; ‘frequent’:>1/min; ‘abundant’:>1/10 sec.  RPP=rhythmic and periodic patterns; GRDA=generalized rhythmic delta activity; FIRDA=frontal intermittent GRDA; LRDA=lateralized rhythmic delta activity; TIRDA=temporal intermittent rhythmic delta activity;  LPD=PLED=lateralized periodic discharges; GPD=generalized periodic discharges; BIPDs =bilateral independent periodic discharges; Mf=multifocal; SIRPDs=stimulus induced rhythmic, periodic, or ictal appearing discharges; BIRDs=brief potentially ictal rhythmic discharges >4 Hz, lasting .5-10s; PFA (paroxysmal bursts >13 Hz or =8 Hz <10s).  Modifiers: +F=with fast component; +S=with spike component; +R=with rhythmic component.  S-B=burst suppression pattern.  Max=maximal. N1-drowsy; N2-stage II sleep; N3-slow wave sleep. SSS/BETS=small sharp spikes/benign epileptiform transients of sleep. HV=hyperventilation; PS=photic stimulation]]]    Daily EEG Visual Analysis    FINDINGS:      Background:  Continuous: continuous  Symmetry: symmetric  PDR: 10 Hz activity, with amplitude to 40 uV, that attenuated to eye opening.  Low amplitude frontal beta noted in wakefulness.  Reactivity: present  Voltage: normal, mostly 20-150uV  Anterior Posterior Gradient: present  Other background findings: none  Breach: absent    Background Slowing:  Generalized slowing: none was present.  Focal slowing: Intermittent right hemispheric delta and theta, more prominent during state changes, max in temporal region, with attenuation of fast frequency compared to left    State Changes:   -Drowsiness noted with increased slowing, attenuation of fast activity, vertex transients.  -Present with N2 sleep transients with symmetric spindles and K-complexes.    Sporadic Epileptiform Discharges:    None    Rhythmic and Periodic Patterns (RPPs):  None     Electrographic and Electroclinical seizures:  None    Other Clinical Events:  None    Activation Procedures:   -Hyperventilation was not performed.    -Photic stimulation was not performed.    Artifacts:  Intermittent myogenic and movement artifacts were noted.    ECG:  The heart rate on single channel ECG was predominantly between 55-65 BPM.    EEG Classification / Summary:  Abnormal  EEG in the awake / drowsy / asleep state(s).  Intermittent polymorphic slowing with loss of fast activity, focal, right hemispheric maximal in temporal region.     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     This is a prelim report only, pending review with attending prior to finalization.    -------------------------------------------------------------------------------------------------------  [facility] EEG Reading Room Ph#: [phone]  Epilepsy Answering Service after 5PM and before 8:30AM: Ph#: [phone]    Abhijeet Leal M.D.   Epilepsy fellow LANIE BERTRAND [id number]     Study Date: 		07-26-22 17:04PM to 07-27-22 08:00AM  Duration x Hours: 14:34 hrs  --------------------------------------------------------------------------------------------------  History:  CC/ HPI Patient is a 82y old  Female who presents with a chief complaint of abnormal imaging (27 Jul 2022 08:13)    MEDICATIONS  (STANDING):  atorvastatin 40 milliGRAM(s) Oral at bedtime  chlorhexidine 2% Cloths 1 Application(s) Topical daily  dexAMETHasone     Tablet 4 milliGRAM(s) Oral every 6 hours  dextrose 5%. 1000 milliLiter(s) (50 mL/Hr) IV Continuous <Continuous>  dextrose 5%. 1000 milliLiter(s) (100 mL/Hr) IV Continuous <Continuous>  dextrose 50% Injectable 25 Gram(s) IV Push once  dextrose 50% Injectable 12.5 Gram(s) IV Push once  dextrose 50% Injectable 25 Gram(s) IV Push once  gabapentin 200 milliGRAM(s) Oral at bedtime  glucagon  Injectable 1 milliGRAM(s) IntraMuscular once  hydrochlorothiazide 25 milliGRAM(s) Oral daily  insulin glargine Injectable (LANTUS) 20 Unit(s) SubCutaneous at bedtime  insulin lispro (ADMELOG) corrective regimen sliding scale   SubCutaneous three times a day before meals  insulin lispro (ADMELOG) corrective regimen sliding scale   SubCutaneous at bedtime  insulin lispro Injectable (ADMELOG) 12 Unit(s) SubCutaneous before lunch  insulin lispro Injectable (ADMELOG) 16 Unit(s) SubCutaneous before dinner  insulin lispro Injectable (ADMELOG) 16 Unit(s) SubCutaneous before breakfast  levETIRAcetam 500 milliGRAM(s) Oral two times a day  losartan 100 milliGRAM(s) Oral daily  melatonin 3 milliGRAM(s) Oral at bedtime  metoprolol tartrate 25 milliGRAM(s) Oral two times a day  sodium chloride 0.9%. 1000 milliLiter(s) (75 mL/Hr) IV Continuous <Continuous>    --------------------------------------------------------------------------------------------------  Study Interpretation:    [[[Abbreviation Key:  PDR=alpha rhythm/posterior dominant rhythm. A-P=anterior posterior.  Amplitude: ‘very low’:<20; ‘low’:20-49; ‘medium’:; ‘high’:>150uV.  Persistence for periodic/rhythmic patterns (% of epoch) ‘rare’:<1%; ‘occasional’:1-10%; ‘frequent’:10-50%; ‘abundant’:50-90%; ‘continuous’:>90%.  Persistence for sporadic discharges: ‘rare’:<1/hr; ‘occasional’:1/min-1/hr; ‘frequent’:>1/min; ‘abundant’:>1/10 sec.  RPP=rhythmic and periodic patterns; GRDA=generalized rhythmic delta activity; FIRDA=frontal intermittent GRDA; LRDA=lateralized rhythmic delta activity; TIRDA=temporal intermittent rhythmic delta activity;  LPD=PLED=lateralized periodic discharges; GPD=generalized periodic discharges; BIPDs =bilateral independent periodic discharges; Mf=multifocal; SIRPDs=stimulus induced rhythmic, periodic, or ictal appearing discharges; BIRDs=brief potentially ictal rhythmic discharges >4 Hz, lasting .5-10s; PFA (paroxysmal bursts >13 Hz or =8 Hz <10s).  Modifiers: +F=with fast component; +S=with spike component; +R=with rhythmic component.  S-B=burst suppression pattern.  Max=maximal. N1-drowsy; N2-stage II sleep; N3-slow wave sleep. SSS/BETS=small sharp spikes/benign epileptiform transients of sleep. HV=hyperventilation; PS=photic stimulation]]]    Daily EEG Visual Analysis    FINDINGS:      Background:  Continuous: continuous  Symmetry: symmetric  PDR: 10 Hz activity, with amplitude to 40 uV, that attenuated to eye opening.  Low amplitude frontal beta noted in wakefulness.  Reactivity: present  Voltage: normal, mostly 20-150uV  Anterior Posterior Gradient: present  Other background findings: none  Breach: absent    Background Slowing:  Generalized slowing: none was present.  Focal slowing: Intermittent right hemispheric delta and theta, more prominent during state changes, max in temporal region, with attenuation of fast frequency compared to left    State Changes:   -Drowsiness noted with increased slowing, attenuation of fast activity, vertex transients.  -Present with N2 sleep transients with symmetric spindles and K-complexes.    Sporadic Epileptiform Discharges:    None    Rhythmic and Periodic Patterns (RPPs):  None     Electrographic and Electroclinical seizures:  None    Other Clinical Events:  None    Activation Procedures:   -Hyperventilation was not performed.    -Photic stimulation was not performed.    Artifacts:  Intermittent myogenic and movement artifacts were noted.    ECG:  The heart rate on single channel ECG was predominantly between 55-65 BPM.    EEG Classification / Summary:  Abnormal  EEG in the awake / drowsy / asleep state(s).  Intermittent polymorphic slowing with loss of fast activity, focal, right hemispheric maximal in right temporal region.     Clinical Impression:  Evidence for right hemispheric (right temporal maximal) cortical/subcortical structural lesion  There were no epileptiform abnormalities or seizures recorded.     -------------------------------------------------------------------------------------------------------  [facility] EEG Reading Room Ph#: [phone]  Epilepsy Answering Service after 5PM and before 8:30AM: Ph#: [phone]    Abhijeet Leal M.D.   Epilepsy fellow LANIE BERTRAND [id number]     Study Date: 		07-26-22 17:04PM to 07-27-22 10:48 AM  Duration x Hours: 17 :22 hrs  --------------------------------------------------------------------------------------------------  History:  CC/ HPI Patient is a 82y old  Female who presents with a chief complaint of abnormal imaging (27 Jul 2022 08:13)    MEDICATIONS  (STANDING):  atorvastatin 40 milliGRAM(s) Oral at bedtime  chlorhexidine 2% Cloths 1 Application(s) Topical daily  dexAMETHasone     Tablet 4 milliGRAM(s) Oral every 6 hours  dextrose 5%. 1000 milliLiter(s) (50 mL/Hr) IV Continuous <Continuous>  dextrose 5%. 1000 milliLiter(s) (100 mL/Hr) IV Continuous <Continuous>  dextrose 50% Injectable 25 Gram(s) IV Push once  dextrose 50% Injectable 12.5 Gram(s) IV Push once  dextrose 50% Injectable 25 Gram(s) IV Push once  gabapentin 200 milliGRAM(s) Oral at bedtime  glucagon  Injectable 1 milliGRAM(s) IntraMuscular once  hydrochlorothiazide 25 milliGRAM(s) Oral daily  insulin glargine Injectable (LANTUS) 20 Unit(s) SubCutaneous at bedtime  insulin lispro (ADMELOG) corrective regimen sliding scale   SubCutaneous three times a day before meals  insulin lispro (ADMELOG) corrective regimen sliding scale   SubCutaneous at bedtime  insulin lispro Injectable (ADMELOG) 12 Unit(s) SubCutaneous before lunch  insulin lispro Injectable (ADMELOG) 16 Unit(s) SubCutaneous before dinner  insulin lispro Injectable (ADMELOG) 16 Unit(s) SubCutaneous before breakfast  levETIRAcetam 500 milliGRAM(s) Oral two times a day  losartan 100 milliGRAM(s) Oral daily  melatonin 3 milliGRAM(s) Oral at bedtime  metoprolol tartrate 25 milliGRAM(s) Oral two times a day  sodium chloride 0.9%. 1000 milliLiter(s) (75 mL/Hr) IV Continuous <Continuous>    --------------------------------------------------------------------------------------------------  Study Interpretation:    [[[Abbreviation Key:  PDR=alpha rhythm/posterior dominant rhythm. A-P=anterior posterior.  Amplitude: ‘very low’:<20; ‘low’:20-49; ‘medium’:; ‘high’:>150uV.  Persistence for periodic/rhythmic patterns (% of epoch) ‘rare’:<1%; ‘occasional’:1-10%; ‘frequent’:10-50%; ‘abundant’:50-90%; ‘continuous’:>90%.  Persistence for sporadic discharges: ‘rare’:<1/hr; ‘occasional’:1/min-1/hr; ‘frequent’:>1/min; ‘abundant’:>1/10 sec.  RPP=rhythmic and periodic patterns; GRDA=generalized rhythmic delta activity; FIRDA=frontal intermittent GRDA; LRDA=lateralized rhythmic delta activity; TIRDA=temporal intermittent rhythmic delta activity;  LPD=PLED=lateralized periodic discharges; GPD=generalized periodic discharges; BIPDs =bilateral independent periodic discharges; Mf=multifocal; SIRPDs=stimulus induced rhythmic, periodic, or ictal appearing discharges; BIRDs=brief potentially ictal rhythmic discharges >4 Hz, lasting .5-10s; PFA (paroxysmal bursts >13 Hz or =8 Hz <10s).  Modifiers: +F=with fast component; +S=with spike component; +R=with rhythmic component.  S-B=burst suppression pattern.  Max=maximal. N1-drowsy; N2-stage II sleep; N3-slow wave sleep. SSS/BETS=small sharp spikes/benign epileptiform transients of sleep. HV=hyperventilation; PS=photic stimulation]]]    Daily EEG Visual Analysis    FINDINGS:      Background:  Continuous: continuous  Symmetry: symmetric  PDR: 10 Hz activity, with amplitude to 40 uV, that attenuated to eye opening.  Low amplitude frontal beta noted in wakefulness.  Reactivity: present  Voltage: normal, mostly 20-150uV  Anterior Posterior Gradient: present  Other background findings: none  Breach: absent    Background Slowing:  Generalized slowing: none was present.  Focal slowing: Intermittent right hemispheric delta and theta, more prominent during state changes, max in temporal region, with attenuation of fast frequency compared to left    State Changes:   -Drowsiness noted with increased slowing, attenuation of fast activity, vertex transients.  -Present with N2 sleep transients with symmetric spindles and K-complexes.    Sporadic Epileptiform Discharges:    None    Rhythmic and Periodic Patterns (RPPs):  None     Electrographic and Electroclinical seizures:  None    Other Clinical Events:  None    Activation Procedures:   -Hyperventilation was not performed.    -Photic stimulation was not performed.    Artifacts:  Intermittent myogenic and movement artifacts were noted.    ECG:  The heart rate on single channel ECG was predominantly between 55-65 BPM.    EEG Classification / Summary:  Abnormal  EEG in the awake / drowsy / asleep state(s).  Intermittent polymorphic slowing with loss of fast activity, focal, right hemispheric maximal in right temporal region.     Clinical Impression:  Evidence for right hemispheric (right temporal maximal) cortical/subcortical structural lesion  There were no epileptiform abnormalities or seizures recorded.     -------------------------------------------------------------------------------------------------------  [facility] EEG Reading Room Ph#: [phone]  Epilepsy Answering Service after 5PM and before 8:30AM: Ph#: [phone]    Abhijeet Leal M.D.   Epilepsy fellow

## 2022-07-27 NOTE — BRIEF OPERATIVE NOTE - ASSISTANT(S)
Jonnie Diggs Alon 1. I was told the name of the doctor(s) who took care of my child while in the hospital.    2. I have been told about any important findings on my child's plan of care.    3. The doctor clearly explained my child's diagnosis and other possible diagnoses that were considered.    4. My child's doctor explained all the tests that were done and their results (if available). I understand that some of the test results may not be ready before we go home and I was told how I can get these results. I understand that a summary of my child's hospitalization and important test results will be shared with my child's outpatient doctor.    5. My child's doctor talked to me about what I need to do when we go home.    6. I understand what signs and symptoms to watch for. I understand what symptoms I would need to call my doctor for and/or return to the hospital.    7. I have the phone number to call the hospital for results and/or questions after I leave the hospital.

## 2022-07-27 NOTE — PROGRESS NOTE ADULT - SUBJECTIVE AND OBJECTIVE BOX
Patient seen and examined at bedside.    --Anticoagulation--    T(C): 36.5 (07-27-22 @ 00:49), Max: 36.9 (07-26-22 @ 16:28)  HR: 60 (07-27-22 @ 00:49) (53 - 60)  BP: 155/83 (07-27-22 @ 00:49) (120/63 - 155/83)  RR: 18 (07-27-22 @ 00:49) (17 - 18)  SpO2: 100% (07-27-22 @ 00:49) (99% - 100%)  Wt(kg): --    Exam: AOx3, PERRL, EOMI, no drift, no facial droop, DE LA GARZA 5/5, SILT

## 2022-07-27 NOTE — PROGRESS NOTE ADULT - SUBJECTIVE AND OBJECTIVE BOX
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; ataxia; frontal lobe syndrome    INTERVAL HISTORY: EEG ongoing; awaiting neurosurgical surgery; s/p bronchoscopy with endobronchial brushings/biopsies of an obstructing left upper lobe endobronchial lesion -> biopsy c/w - poorly differentiated adenocarcinoma of lung origin; no shortness of breath or hypoxemia on room air; no cough, sputum production, hemoptysis, chest congestion or wheeze; no fevers, chills or sweats; no chest pain/pressure or palpitations; ongoing decreased inhibition, pressured speech and ataxia;     REVIEW OF SYSTEMS:  Constitutional: As per interval history  HEENT: Within normal limits  CV: As per interval history  Resp: As per interval history  GI: Within normal limits   : Within normal limits  Musculoskeletal: Within normal limits  Skin: Within normal limits  Neurological: ataxia - pressured speech  Psychiatric: Within normal limits  Endocrine: Within normal limits  Hematologic/Lymphatic: lung cancer  Allergic/Immunologic: Within normal limits    MEDICATIONS:     Pulmonary "    Anti-microbials:    Cardiovascular:  hydrochlorothiazide 25 milliGRAM(s) Oral daily  losartan 100 milliGRAM(s) Oral daily  metoprolol tartrate 25 milliGRAM(s) Oral two times a day    Other:  atorvastatin 40 milliGRAM(s) Oral at bedtime  chlorhexidine 2% Cloths 1 Application(s) Topical daily  dexAMETHasone     Tablet 4 milliGRAM(s) Oral every 6 hours  dextrose 5%. 1000 milliLiter(s) IV Continuous <Continuous>  dextrose 5%. 1000 milliLiter(s) IV Continuous <Continuous>  dextrose 50% Injectable 25 Gram(s) IV Push once  dextrose 50% Injectable 12.5 Gram(s) IV Push once  dextrose 50% Injectable 25 Gram(s) IV Push once  gabapentin 200 milliGRAM(s) Oral at bedtime  glucagon  Injectable 1 milliGRAM(s) IntraMuscular once  insulin glargine Injectable (LANTUS) 20 Unit(s) SubCutaneous at bedtime  insulin lispro (ADMELOG) corrective regimen sliding scale   SubCutaneous three times a day before meals  insulin lispro (ADMELOG) corrective regimen sliding scale   SubCutaneous at bedtime  insulin lispro Injectable (ADMELOG) 12 Unit(s) SubCutaneous before lunch  insulin lispro Injectable (ADMELOG) 16 Unit(s) SubCutaneous before dinner  insulin lispro Injectable (ADMELOG) 16 Unit(s) SubCutaneous before breakfast  levETIRAcetam 500 milliGRAM(s) Oral two times a day  melatonin 3 milliGRAM(s) Oral at bedtime  sodium chloride 0.9%. 1000 milliLiter(s) IV Continuous <Continuous>    MEDICATIONS  (PRN):  dextrose Oral Gel 15 Gram(s) Oral once PRN Blood Glucose LESS THAN 70 milliGRAM(s)/deciliter    OBJECTIVE:    POCT Blood Glucose.: 149 mg/dL (26 Jul 2022 21:20)  POCT Blood Glucose.: 105 mg/dL (26 Jul 2022 17:14)  POCT Blood Glucose.: 262 mg/dL (26 Jul 2022 12:13)  POCT Blood Glucose.: 80 mg/dL (26 Jul 2022 08:09)    PHYSICAL EXAM:       ICU Vital Signs Last 24 Hrs  T(C): 36.5 (27 Jul 2022 00:49), Max: 36.9 (26 Jul 2022 16:28)  T(F): 97.7 (27 Jul 2022 00:49), Max: 98.5 (26 Jul 2022 16:28)  HR: 60 (27 Jul 2022 00:49) (53 - 60)  BP: 155/83 (27 Jul 2022 00:49) (120/63 - 155/83)  BP(mean): --  ABP: --  ABP(mean): --  RR: 18 (27 Jul 2022 00:49) (17 - 18)  SpO2: 100% (27 Jul 2022 00:49) (99% - 100%) on room air    General: Awake. Alert. Cooperative. No distress. Appears stated age. Loquacious. Pressured speech.	  HEENT:  Atraumatic. Normocephalic. Anicteric. Normal oral mucosa. PERRL. EOMI. EEG leads in place.  Neck: Supple. Trachea midline. Thyroid without enlargement/tenderness/nodules. No carotid bruit. No JVD.	  Cardiovascular: Regular rate and rhythm. S1 S2 normal. No murmurs, rubs or gallops.  Respiratory: Respirations unlabored. Clear to auscultation and percussion bilaterally. No curvature.  Abdomen: Soft. Non-tender. Non-distended. No organomegaly. No masses. Normal bowel sounds.  Extremities: Warm to touch. No clubbing or cyanosis. No pedal edema.  Pulses: 2+ peripheral pulses all extremities.	  Skin: Normal skin color. No rashes or lesions. No ecchymoses. No cyanosis. Warm to touch.  Lymph Nodes: Cervical, supraclavicular and axillary nodes normal  Neurological: Ataxic gait. A and O x 3  Psychiatry: Appropriate mood and affect.    LABS:                          13.3   16.53 )-----------( 329      ( 26 Jul 2022 06:51 )             40.4     CBC    WBC  16.53 <==, 14.95 <==, 15.12 <==, 11.97 <==    Hemoglobin  13.3 <<==, 15.2 <<==, 13.6 <<==, 13.3 <<==    Hematocrit  40.4 <==, 47.1 <==, 41.8 <==, 41.5 <==    Platelets  329 <==, 374 <==, 417 <==, 329 <==      144  |  105  |  56<H>  ----------------------------<  56<L>    07-26  3.4<L>   |  24  |  1.18      LYTES    sodium  144 <==, 141 <==, 142 <==, 138 <==, 140 <==    potassium   3.4 <==, 4.2 <==, 3.3 <==, 4.1 <==, 4.7 <==    chloride  105 <==, 107 <==, 102 <==, 100 <==, 104 <==    carbon dioxide  24 <==, 23 <==, 26 <==, 23 <==, 22 <==    =============================================================================================  RENAL FUNCTION:    Creatinine:   1.18  <<==, 1.06  <<==, 1.08  <<==, 0.98  <<==, 1.08  <<==    BUN:   56 <==, 52 <==, 38 <==, 38 <==, 36 <==    ============================================================================================    calcium   9.5 <==, 8.8 <==, 9.6 <==, 9.8 <==, 9.8 <==    phos   3.3 <==, 3.4 <==    mag   2.2 <==, 1.5 <==    ============================================================================================  PT/INR - ( 24 Jul 2022 06:15 )   PT: 10.1 sec;   INR: 0.87 ratio      PTT - ( 24 Jul 2022 06:15 )  PTT:25.7 sec    < from: TTE with Doppler (w/Cont) (07.22.22 @ 10:09) >    Patient name: LANIE BERTRAND  YOB: 1940   Age: 82 (F)   MR#: [id number]  Study Date: 7/22/2022  Location: [address] [place], [place] [id number]: Yelena Louis RDCS  Study quality: Technically difficult  Referring Physician: Mk Garza MD  BloodPressure: 116/72 mmHg  Height: 165 cm  Weight: 73 kg  BSA: 1.8 m2  ------------------------------------------------------------------------  PROCEDURE: Transthoracic echocardiogram with 2-D, M-Mode  and complete spectral and color flow Doppler.  INDICATION: Encounter for other preprocedural examination  (Z01.818)  ------------------------------------------------------------------------  Dimensions:    Normal Values:  LA:     3.4    2.0 - 4.0 cm  Ao:     3.1    2.0 - 3.8 cm  SEPTUM: 1.0    0.6 - 1.2 cm  PWT:    0.8    0.6 - 1.1 cm  LVIDd:  4.8    3.0 - 5.6 cm  LVIDs:  3.7    1.8 - 4.0 cm  Derived variables:  LVMI: 82 g/m2  RWT: 0.33  EF (Visual Estimate): 55 %  ------------------------------------------------------------------------  Observations:  Mitral Valve: Mitral annular calcification.  Aortic Valve/Aorta: Normal aortic valve.  Normal aortic root size.  Left Atrium: Normal left atrium.  Left Ventricle: Endocardial visualization enhanced with  intravenous injection of Ultrasonic Enhancing Agent  (Lumason).  Normal left ventricular internal dimensions with discrete  upper septal hypertrophy.  Estimated ejection fraction 55%, The inferolateral wall is  akinetic.  Impaired LV-relaxation with normal filling pressure.  Right Heart: Normal right atrium. Normal right ventricular  size and function.  Normal tricuspid valve.  Normal pulmonic valve. Mild pulmonic regurgitation.  Pericardium/Pleura: Normal pericardium with no pericardial  effusion.  Hemodynamic: Estimated right atrial pressure is normal.  No evidence of pulmonary hypertension.  ------------------------------------------------------------------------  Conclusions:  Endocardial visualization enhanced with intravenous  injection of Ultrasonic Enhancing Agent (Lumason).  Normal left ventricular internal dimensions with discrete  upper septal hypertrophy.  Estimated ejection fraction 55%, The inferolateral wall is  akinetic.  ------------------------------------------------------------------------  Confirmed on  7/22/2022 - 14:42:07 by David Vicente MD, FASE  ------------------------------------------------------------------------  ---------------------------------------------------------------------------------------------------------------  Cytopathology - Non Gyn Report (07.20.22 @ 16:10)   Cytopathology - Non Gyn Report:   ACCESSION No: [id number]   Patient: LANIE BERTRAND   Final Diagnosis   1. LUNG, LEFT UPPER LOBE, BRONCHOALVEOLAR LAVAGE   NEGATIVE FOR MALIGNANT CELLS.   Cytology slide and cell block revel ciliated bronchial cells.   2. LUNG, LEFT, ENDOBRONCHIAL BRUSH   POSITIVE FOR MALIGNANT CELLS.   Non small cell carcinoma favor adenocarcinoma   ---------------------------------------------------------------------------------------------------------------  Surgical Pathology Report (07.20.22 @ 11:53)   Surgical Pathology Report:   1. Left mainstem bronchus biopsy   Final Diagnosis   Bronchus, mainstem, left, biopsy:   - Poorly differentiated adenocarcinoma of lung origin, see comment.   Comment: Immunohistochemical stains: The neoplastic cells are positive   for CAM 5.2 and TTF-1 while negative for P40, CK20, chromogranin,   synaptophysin and CD56. Ki67 reveals a proliferative index of 40%.   In summary the morphology and immunophenotype are consistent with poorly   differentiated adenocarcinoma of lung origin.   Dr. Hopkins was notified of the diagnosis on 07/26/2022.   ---------------------------------------------------------------------------------------------------------------  MICROBIOLOGY:   COVID-19 PCR . (07.18.22 @ 23:25)   [facility]    Culture - Bronchial (07.20.22 @ 16:52)   Culture Results:   Normal Respiratory Tamy present     Culture - Acid Fast - Bronchial w/Smear (07.20.22 @ 16:52)   Specimen Source: .Bronchial Bronchial Lavage   Acid Fast Bacilli Smear:   No acid fast bacilli seen by fluorochrome stain     RADIOLOGY:  [x] Chest radiographs reviewed and interpreted by me    EXAM:  CT ABDOMEN AND PELVIS IC                        EXAM:  CT CHEST IC                          PROCEDURE DATE:  07/18/2022      FINDINGS:  CHEST:  LUNGS AND LARGE AIRWAYS: There is a heterogeneous left upper lobe mass   approximately measuring 3.3 x 2.6 cm (2, 28) obstructing the left apical   posterior bronchus with partial atelectasis of the left upper lobe. Few   scattered bilateral pulmonary nodules measuring up to 3 mm in the right   upper lobe (2, 39).  PLEURA: No pleural effusion.  VESSELS: Atherosclerotic changes of the aorta and coronary arteries.  HEART: Heart size is normal. No pericardial effusion.  MEDIASTINUM AND SIXTO: Soft tissue contiguous with mass versus adenopathy   measuring 1.7 x 1.5 cm (2, 33).  CHEST WALL AND LOWER NECK: Bilateral subcentimeter hypoattenuating   thyroid nodules.    ABDOMEN AND PELVIS:  LIVER: Within normal limits.  BILE DUCTS: Normal caliber.  GALLBLADDER: Within normal limits.  SPLEEN: Within normal limits.  PANCREAS: Within normal limits.  ADRENALS: Indeterminant left adrenal nodule measuring 1.2 cm.  KIDNEYS/URETERS: No hydronephrosis. Renal cysts and subcentimeter   hypoattenuating foci bilaterally, too small to characterize.    BLADDER: Within normal limits.  REPRODUCTIVE ORGANS: Uterus and adnexa within normal limits.    BOWEL: Colonic diverticula withoutevidence of acute diverticulitis. No   bowel obstruction. Appendix is normal.  PERITONEUM: No ascites.  VESSELS: Atherosclerotic changes.  RETROPERITONEUM/LYMPH NODES: No lymphadenopathy.  ABDOMINAL WALL: Within normal limits.  BONES: Degenerative changes. Grade 1 anterolisthesis of L5 on S1. Status   post right shoulder arthroplasty.    IMPRESSION:  Left upper lobe lung mass with partial atelectasis of the left upper   lobe, concerning for primary lung neoplasm. Few scattered sub-4 mm   pulmonary nodules, indeterminate.    Indeterminant left adrenal nodule for which contrast enhanced MRI is   recommended for further evaluation.    DEEPAK HARRINGTON MD; Resident Radiologist  This document has been electronically signed.  HAYLEY DENTON MD; Attending Radiologist  This document has been electronically signed. Jul 19 2022  9:02AM  ---------------------------------------------------------------------------------------------------------------  EXAM:  CT BRAIN                          PROCEDURE DATE:  07/18/2022      FINDINGS:  A cystic versus centrally necrotic mass in the right temporal lobe   measures approximately 4.8 x 3.2 x 3.2 cm (602:15 and 601:33).    There moderate tolarge amount of vasogenic edema in the right frontal,   parietal and temporal lobes.  There is regional mass effect in the right   cerebral hemisphere with partial effacement of the right lateral   ventricle and third ventricle.  There is medial bulging of the right   thalamus across the midline by up to 1.4 cm (2:17).  There is   approximately 5 mm subfalcine subfalcine herniation of the right frontal   lobe underneath the cerebral falx (2:21).    There is an approximately 1.4 x 1.3 x 1.6 cm cystic versus centimeter   necrotic mass in the medial left frontal lobe.  There appears to be trace   edema surrounding this lesion, without significant mass effect.    There is no evidence of acute intracranial hemorrhage, transtentorial   herniation or acute territorial infarct.  Mild prominence the left   lateral ventricle may be related to pre-existing cerebral volume loss   rather than hydrocephalus.    The paranasal sinuses and mastoids are grossly clear.    The patient is status post cataractlens placement surgery bilaterally.    The calvarium and skull base appear within normal limits.    IMPRESSION:  Cystic versus a necrotic masses measure approximately 4.8 x 3.2 x 3.2 cm   in the right temporal lobe and 1.4 x 1.3 x 1.6 cm in the medial left   frontal lobe.    Moderate to large amount of vasogenic edema in the right frontal,   parietal and temporal lobes.  Trace edema in the left frontal lobe.    Regional mass effect in the right cerebral hemisphere with partial   effacement of theventricular system and 5 mm subfalcine herniation of   the right frontal lobe underneath the cerebral falx.    The findings may represent intracranial metastatic disease versus   glioblastoma.  Contrast-enhanced brain MRI is recommended for further   evaluation.      Findings discussed with DAVID Albarran by Dr. Harrington at 2153 hours on   7/8/2022 with readback confirmation.    DEEPAK HARRINGTON MD; Resident Radiologist  This document has been electronically signed.  PHILLY CISSE MD; Attending Radiologist  This document has been electronically signed. Jul 18 2022 10:15PM  ---------------------------------------------------------------------------------------------------------------  EXAM:  MR BRAIN WAW IC                          PROCEDURE DATE:  07/20/2022      FINDINGS:    Right temporal lobe peripherally enhancing, centrally necrotic 3.1 x 2.6   cm mass demonstrating mild diffusion restriction. There is surrounding   vasogenic edema. 8 mm leftward midline shift. Right uncal herniation.   Additional left parafalcine 1.3 x 0.9 cm rim-enhancing cystic lesion with   mild surrounding vasogenic edema. Tiny 0.3 x 0.3 cm rim-enhancing lesion   within the left frontal cortex (10:125).    Scattered foci of T2/FLAIR hyperintensity in the bilateral hemispheric   white matter are nonspecific but likely related to chronic white matter   microvascular ischemic disease. The left lateral ventricle is mildly   dilated. Flow voids of the major intracranial vessels at the skull base   follow expected course and contour.    The paranasal sinuses demonstrate no signal abnormality. The mastoids   demonstrate no signal abnormality.  Status post bilateral intraocular   lens implants.      IMPRESSION:  Right temporal lobe peripherally enhancing, centrally necrotic 3.1 x 2.6   cm mass demonstrating mild diffusion restriction. There is surrounding   vasogenic edema. 8 mm leftward midline shift. Right uncal herniation.   Additional left parafalcine 1.3 x 0.9 cm rim-enhancing cystic lesion with   mild surrounding vasogenic edema. Tiny 0.3 x 0.3 cm rim-enhancing lesion   within the left frontal cortex. Findings are concerning for metastatic   disease in the context of patient's known primary lung cancer. The left   lateral ventricle is mildly dilated.    NEIL DIOP MD; Resident Radiologist  This document has been electronically signed.  NERI BARRERA MD; Attending Radiologist  This document has been electronically signed. Jul 21 2022  4:26PM  ---------------------------------------------------------------------------------------------------------------  EXAM:  DUPLEX SCAN EXT VEINS LOWER BI                          PROCEDURE DATE:  07/24/2022      IMPRESSION:  No evidence of deep venous thrombosis in either lower extremity.    TISH VIDAL MD; Attending Radiologist  This document has been electronically signed. Jul 24 2022 11:19AM  ---------------------------------------------------------------------------------------------------------------

## 2022-07-27 NOTE — PROGRESS NOTE ADULT - SUBJECTIVE AND OBJECTIVE BOX
DATE OF SERVICE: 07-27-22 @ 15:20    Patient is a 82y old  Female who presents with a chief complaint of abnormal imaging (27 Jul 2022 13:05)      SUBJECTIVE / OVERNIGHT EVENTS:  seen and examined this mourning  No chest pain. No shortness of breath. No complaints. No events overnight.     MEDICATIONS  (STANDING):    MEDICATIONS  (PRN):      Vital Signs Last 24 Hrs  T(C): 36.6 (27 Jul 2022 11:11), Max: 36.9 (26 Jul 2022 16:28)  T(F): 97.9 (27 Jul 2022 11:11), Max: 98.5 (26 Jul 2022 16:28)  HR: 70 (27 Jul 2022 11:11) (50 - 70)  BP: 116/49 (27 Jul 2022 11:11) (116/49 - 155/83)  BP(mean): --  RR: 18 (27 Jul 2022 11:11) (18 - 18)  SpO2: 100% (27 Jul 2022 11:11) (99% - 100%)    Parameters below as of 27 Jul 2022 11:11  Patient On (Oxygen Delivery Method): room air      CAPILLARY BLOOD GLUCOSE      POCT Blood Glucose.: 93 mg/dL (27 Jul 2022 07:37)  POCT Blood Glucose.: 149 mg/dL (26 Jul 2022 21:20)  POCT Blood Glucose.: 105 mg/dL (26 Jul 2022 17:14)    I&O's Summary    26 Jul 2022 07:01  -  27 Jul 2022 07:00  --------------------------------------------------------  IN: 1245 mL / OUT: 0 mL / NET: 1245 mL    27 Jul 2022 07:01  -  27 Jul 2022 15:20  --------------------------------------------------------  IN: 0 mL / OUT: 0 mL / NET: 0 mL        PHYSICAL EXAM:  GENERAL: NAD, well-developed  HEAD:  Atraumatic, Normocephalic  EYES: EOMI, PERRLA, conjunctiva and sclera clear  NECK: Supple, No JVD  CHEST/LUNG: Clear to auscultation bilaterally; No wheeze  HEART: Regular rate and rhythm; No murmurs, rubs, or gallops  ABDOMEN: Soft, Nontender, Nondistended; Bowel sounds present  EXTREMITIES:  2+ Peripheral Pulses, No clubbing, cyanosis, or edema  PSYCH: AAOx3  NEUROLOGY: non-focal  SKIN: No rashes or lesions    LABS:                        13.1   15.37 )-----------( 309      ( 27 Jul 2022 07:14 )             38.8     07-27    142  |  110<H>  |  43<H>  ----------------------------<  90  4.1   |  20<L>  |  0.93    Ca    9.1      27 Jul 2022 07:17      PT/INR - ( 27 Jul 2022 07:17 )   PT: 11.6 sec;   INR: 1.00 ratio                   RADIOLOGY & ADDITIONAL TESTS:    Imaging Personally Reviewed:    Consultant(s) Notes Reviewed:      Care Discussed with Consultants/Other Providers:

## 2022-07-27 NOTE — PROGRESS NOTE ADULT - SUBJECTIVE AND OBJECTIVE BOX
Chief complaint  Patient is a 82y old  Female who presents with a chief complaint of abnormal imaging (27 Jul 2022 11:32)   Review of systems  Patient for OR today, no hypoglycemic episodes.    Labs and Fingersticks  CAPILLARY BLOOD GLUCOSE      POCT Blood Glucose.: 93 mg/dL (27 Jul 2022 07:37)  POCT Blood Glucose.: 149 mg/dL (26 Jul 2022 21:20)  POCT Blood Glucose.: 105 mg/dL (26 Jul 2022 17:14)      Medications  MEDICATIONS  (STANDING):      Physical Exam  Vital Signs Last 12 Hrs  T(F): 97.9 (07-27-22 @ 11:11), Max: 97.9 (07-27-22 @ 11:11)  HR: 70 (07-27-22 @ 11:11) (50 - 70)  BP: 116/49 (07-27-22 @ 11:11) (116/49 - 134/88)  BP(mean): --  RR: 18 (07-27-22 @ 11:11) (18 - 18)  SpO2: 100% (07-27-22 @ 11:11) (100% - 100%)    Diagnostics    Metanephrine, Plasma: AM Sched. Collection: 21-Jul-2022 06:00 (07-20 @ 10:54)  Aldosterone, Serum: AM Sched. Collection: 21-Jul-2022 06:00 (07-20 @ 10:54)  Cortisol AM, Serum: AM Sched. Collection: 21-Jul-2022 06:00 (07-20 @ 10:54)         declines

## 2022-07-27 NOTE — PROGRESS NOTE ADULT - ASSESSMENT
Assessment  DMT2: 82y Female with DM T2 with hyperglycemia, A1C 8.4%, was on oral meds and insulin at home, now on basal bolus insulin, received Lantus 20u last night 2/2 NPO status, blood sugars trending within acceptable range, no hypoglycemias, NPO for OR today.  Brain Mass: on meds, monitored, FU Neurosurgery.  Lung Mass: s/p bronchoscopy, CA workup in progress, monitored.  Thyroid Nodules: seen on imaging, B/L subcentimeter thyroid nodules, she denies neck mass/pain/dysphagia, euthyroid, reports known history and is being followed by Dr. Richardson.  Adrenal Nodule: seen on imaging, 1.2 cm left thyroid nodule, unknown history. AM cortisol suppressed 2/2 current steroid use, remaining labs pending..        Troy Thibodeaux MD  [phone]

## 2022-07-28 LAB
ANION GAP SERPL CALC-SCNC: 11 MMOL/L — SIGNIFICANT CHANGE UP (ref 5–17)
BUN SERPL-MCNC: 31 MG/DL — HIGH (ref 7–23)
CALCIUM SERPL-MCNC: 9.1 MG/DL — SIGNIFICANT CHANGE UP (ref 8.4–10.5)
CHLORIDE SERPL-SCNC: 104 MMOL/L — SIGNIFICANT CHANGE UP (ref 96–108)
CO2 SERPL-SCNC: 25 MMOL/L — SIGNIFICANT CHANGE UP (ref 22–31)
CREAT SERPL-MCNC: 1.15 MG/DL — SIGNIFICANT CHANGE UP (ref 0.5–1.3)
EGFR: 48 ML/MIN/1.73M2 — LOW
GLUCOSE BLDC GLUCOMTR-MCNC: 119 MG/DL — HIGH (ref 70–99)
GLUCOSE BLDC GLUCOMTR-MCNC: 210 MG/DL — HIGH (ref 70–99)
GLUCOSE BLDC GLUCOMTR-MCNC: 79 MG/DL — SIGNIFICANT CHANGE UP (ref 70–99)
GLUCOSE BLDC GLUCOMTR-MCNC: 94 MG/DL — SIGNIFICANT CHANGE UP (ref 70–99)
GLUCOSE SERPL-MCNC: 142 MG/DL — HIGH (ref 70–99)
HCT VFR BLD CALC: 36.3 % — SIGNIFICANT CHANGE UP (ref 34.5–45)
HGB BLD-MCNC: 12.2 G/DL — SIGNIFICANT CHANGE UP (ref 11.5–15.5)
MAGNESIUM SERPL-MCNC: 1.8 MG/DL — SIGNIFICANT CHANGE UP (ref 1.6–2.6)
MAGNESIUM SERPL-MCNC: 1.9 MG/DL — SIGNIFICANT CHANGE UP (ref 1.6–2.6)
MCHC RBC-ENTMCNC: 31.2 PG — SIGNIFICANT CHANGE UP (ref 27–34)
MCHC RBC-ENTMCNC: 33.6 GM/DL — SIGNIFICANT CHANGE UP (ref 32–36)
MCV RBC AUTO: 92.8 FL — SIGNIFICANT CHANGE UP (ref 80–100)
NRBC # BLD: 0 /100 WBCS — SIGNIFICANT CHANGE UP (ref 0–0)
PHOSPHATE SERPL-MCNC: 4.2 MG/DL — SIGNIFICANT CHANGE UP (ref 2.5–4.5)
PHOSPHATE SERPL-MCNC: 4.2 MG/DL — SIGNIFICANT CHANGE UP (ref 2.5–4.5)
PLATELET # BLD AUTO: 254 K/UL — SIGNIFICANT CHANGE UP (ref 150–400)
POTASSIUM SERPL-MCNC: 4.5 MMOL/L — SIGNIFICANT CHANGE UP (ref 3.5–5.3)
POTASSIUM SERPL-SCNC: 4.5 MMOL/L — SIGNIFICANT CHANGE UP (ref 3.5–5.3)
RBC # BLD: 3.91 M/UL — SIGNIFICANT CHANGE UP (ref 3.8–5.2)
RBC # FLD: 12.7 % — SIGNIFICANT CHANGE UP (ref 10.3–14.5)
SODIUM SERPL-SCNC: 140 MMOL/L — SIGNIFICANT CHANGE UP (ref 135–145)
WBC # BLD: 23.88 K/UL — HIGH (ref 3.8–10.5)
WBC # FLD AUTO: 23.88 K/UL — HIGH (ref 3.8–10.5)

## 2022-07-28 PROCEDURE — 99233 SBSQ HOSP IP/OBS HIGH 50: CPT

## 2022-07-28 PROCEDURE — 70450 CT HEAD/BRAIN W/O DYE: CPT | Mod: 26

## 2022-07-28 PROCEDURE — 93970 EXTREMITY STUDY: CPT | Mod: 26

## 2022-07-28 RX ORDER — POLYETHYLENE GLYCOL 3350 17 G/17G
17 POWDER, FOR SOLUTION ORAL
Refills: 0 | Status: DISCONTINUED | OUTPATIENT
Start: 2022-07-28 | End: 2022-08-04

## 2022-07-28 RX ORDER — ENOXAPARIN SODIUM 100 MG/ML
40 INJECTION SUBCUTANEOUS
Refills: 0 | Status: DISCONTINUED | OUTPATIENT
Start: 2022-07-28 | End: 2022-08-01

## 2022-07-28 RX ORDER — INSULIN LISPRO 100/ML
12 VIAL (ML) SUBCUTANEOUS
Refills: 0 | Status: DISCONTINUED | OUTPATIENT
Start: 2022-07-29 | End: 2022-07-31

## 2022-07-28 RX ORDER — INSULIN LISPRO 100/ML
12 VIAL (ML) SUBCUTANEOUS
Refills: 0 | Status: DISCONTINUED | OUTPATIENT
Start: 2022-07-28 | End: 2022-07-31

## 2022-07-28 RX ORDER — INSULIN LISPRO 100/ML
8 VIAL (ML) SUBCUTANEOUS
Refills: 0 | Status: DISCONTINUED | OUTPATIENT
Start: 2022-07-28 | End: 2022-07-30

## 2022-07-28 RX ORDER — AMLODIPINE BESYLATE 2.5 MG/1
5 TABLET ORAL DAILY
Refills: 0 | Status: DISCONTINUED | OUTPATIENT
Start: 2022-07-28 | End: 2022-08-01

## 2022-07-28 RX ORDER — MULTIVIT WITH MIN/MFOLATE/K2 340-15/3 G
1 POWDER (GRAM) ORAL ONCE
Refills: 0 | Status: DISCONTINUED | OUTPATIENT
Start: 2022-07-28 | End: 2022-07-28

## 2022-07-28 RX ORDER — ACETAMINOPHEN 500 MG
1000 TABLET ORAL ONCE
Refills: 0 | Status: COMPLETED | OUTPATIENT
Start: 2022-07-28 | End: 2022-07-28

## 2022-07-28 RX ORDER — PANTOPRAZOLE SODIUM 20 MG/1
40 TABLET, DELAYED RELEASE ORAL
Refills: 0 | Status: DISCONTINUED | OUTPATIENT
Start: 2022-07-29 | End: 2022-08-04

## 2022-07-28 RX ORDER — DIVALPROEX SODIUM 500 MG/1
500 TABLET, DELAYED RELEASE ORAL EVERY 8 HOURS
Refills: 0 | Status: DISCONTINUED | OUTPATIENT
Start: 2022-07-28 | End: 2022-08-03

## 2022-07-28 RX ORDER — INSULIN GLARGINE 100 [IU]/ML
15 INJECTION, SOLUTION SUBCUTANEOUS AT BEDTIME
Refills: 0 | Status: DISCONTINUED | OUTPATIENT
Start: 2022-07-28 | End: 2022-07-29

## 2022-07-28 RX ORDER — DEXAMETHASONE 0.5 MG/5ML
4 ELIXIR ORAL EVERY 8 HOURS
Refills: 0 | Status: DISCONTINUED | OUTPATIENT
Start: 2022-07-28 | End: 2022-07-31

## 2022-07-28 RX ORDER — MAGNESIUM SULFATE 500 MG/ML
1 VIAL (ML) INJECTION ONCE
Refills: 0 | Status: COMPLETED | OUTPATIENT
Start: 2022-07-28 | End: 2022-07-28

## 2022-07-28 RX ADMIN — Medication 1000 MILLIGRAM(S): at 01:53

## 2022-07-28 RX ADMIN — INSULIN GLARGINE 15 UNIT(S): 100 INJECTION, SOLUTION SUBCUTANEOUS at 21:36

## 2022-07-28 RX ADMIN — LOSARTAN POTASSIUM 100 MILLIGRAM(S): 100 TABLET, FILM COATED ORAL at 11:28

## 2022-07-28 RX ADMIN — Medication 4 MILLIGRAM(S): at 21:35

## 2022-07-28 RX ADMIN — Medication 16 UNIT(S): at 08:39

## 2022-07-28 RX ADMIN — Medication 25 MILLIGRAM(S): at 05:13

## 2022-07-28 RX ADMIN — SENNA PLUS 2 TABLET(S): 8.6 TABLET ORAL at 21:36

## 2022-07-28 RX ADMIN — OXYCODONE HYDROCHLORIDE 10 MILLIGRAM(S): 5 TABLET ORAL at 21:36

## 2022-07-28 RX ADMIN — OXYCODONE HYDROCHLORIDE 5 MILLIGRAM(S): 5 TABLET ORAL at 03:45

## 2022-07-28 RX ADMIN — PANTOPRAZOLE SODIUM 40 MILLIGRAM(S): 20 TABLET, DELAYED RELEASE ORAL at 11:28

## 2022-07-28 RX ADMIN — ATORVASTATIN CALCIUM 40 MILLIGRAM(S): 80 TABLET, FILM COATED ORAL at 21:35

## 2022-07-28 RX ADMIN — Medication 4 MILLIGRAM(S): at 05:13

## 2022-07-28 RX ADMIN — Medication 100 MILLIGRAM(S): at 05:12

## 2022-07-28 RX ADMIN — OXYCODONE HYDROCHLORIDE 10 MILLIGRAM(S): 5 TABLET ORAL at 22:06

## 2022-07-28 RX ADMIN — DIVALPROEX SODIUM 500 MILLIGRAM(S): 500 TABLET, DELAYED RELEASE ORAL at 21:35

## 2022-07-28 RX ADMIN — Medication 4 MILLIGRAM(S): at 14:08

## 2022-07-28 RX ADMIN — Medication 10 MILLIGRAM(S): at 12:51

## 2022-07-28 RX ADMIN — Medication 4: at 08:40

## 2022-07-28 RX ADMIN — Medication 400 MILLIGRAM(S): at 01:23

## 2022-07-28 RX ADMIN — Medication 650 MILLIGRAM(S): at 18:04

## 2022-07-28 RX ADMIN — Medication 100 GRAM(S): at 06:08

## 2022-07-28 RX ADMIN — Medication 55 MILLIGRAM(S): at 05:12

## 2022-07-28 RX ADMIN — AMLODIPINE BESYLATE 5 MILLIGRAM(S): 2.5 TABLET ORAL at 05:13

## 2022-07-28 RX ADMIN — OXYCODONE HYDROCHLORIDE 5 MILLIGRAM(S): 5 TABLET ORAL at 04:15

## 2022-07-28 RX ADMIN — DIVALPROEX SODIUM 500 MILLIGRAM(S): 500 TABLET, DELAYED RELEASE ORAL at 14:09

## 2022-07-28 RX ADMIN — POLYETHYLENE GLYCOL 3350 17 GRAM(S): 17 POWDER, FOR SOLUTION ORAL at 17:59

## 2022-07-28 RX ADMIN — Medication 25 MILLIGRAM(S): at 17:59

## 2022-07-28 RX ADMIN — CHLORHEXIDINE GLUCONATE 1 APPLICATION(S): 213 SOLUTION TOPICAL at 21:36

## 2022-07-28 RX ADMIN — GABAPENTIN 200 MILLIGRAM(S): 400 CAPSULE ORAL at 21:36

## 2022-07-28 RX ADMIN — Medication 650 MILLIGRAM(S): at 10:30

## 2022-07-28 RX ADMIN — Medication 650 MILLIGRAM(S): at 11:00

## 2022-07-28 RX ADMIN — ENOXAPARIN SODIUM 40 MILLIGRAM(S): 100 INJECTION SUBCUTANEOUS at 18:00

## 2022-07-28 NOTE — PHYSICAL THERAPY INITIAL EVALUATION ADULT - PRECAUTIONS/LIMITATIONS, REHAB EVAL
CTH showing 4.8x3.2x3.2cm R temp mass and 1.4x1.3x1.6cm L frontal mass w/ vasogenic edema in mult lobes; ~8mm MLS. Also got MR L spine (7/14) form LBP radiating to legs showing multilevel foraminal stenosis/disc bulges; L5-S1 grade 1 spondy. Planned for possible OR for resection on monday 7/25./fall precautions
L frontal mass w/ vasogenic edema in mult lobes; ~8mm MLS. Also got MR L spine (7/14) form LBP radiating to BLES showing multilevel foraminal stenosis/disc bulges; L5-S1 grade 1 spondy. Hospital Course: Pt is now s/p R craniotomy for tumor resection on 7/27. CT CHEST (7/18): Left upper lobe lung mass with partial atelectasis of the left upper lobe, concerning for primary lung neoplasm. Few scattered sub-4 mm pulmonary nodules, indeterminate. CT HEAD (7/27): Mild pneumocephalus. Mod residual edema is noted but improved. There is improved effacement of RIGHT lateral ventricle with 8 mm subfalcine herniation to the LEFT, improved from prior./fall precautions

## 2022-07-28 NOTE — PHYSICAL THERAPY INITIAL EVALUATION ADULT - CRITERIA FOR SKILLED THERAPEUTIC INTERVENTIONS
impairments found
impairments found/functional limitations in following categories/rehab potential/therapy frequency/predicted duration of therapy intervention/anticipated discharge recommendation

## 2022-07-28 NOTE — DIETITIAN INITIAL EVALUATION ADULT - ORAL INTAKE PTA/DIET HISTORY
Pt reports good appetite PTA; consumes three meals daily and eats a variety of foods. Denies food allergies, denies intolerance to chewing/swallowing. Takes D3 and MVI. Denies N/V; reports occasional diarrhea and/or constipation. Pt educated on importance of increased protein needs at this time 2/2 neurosurgical procedure. Briefly discussed importance of pairing protein source with carbohydrate to aid in management of BG. To note, pt currently prescribed Decadron; at risk for elevated FSBG + hx of DM (see below).  No

## 2022-07-28 NOTE — OCCUPATIONAL THERAPY INITIAL EVALUATION ADULT - PRECAUTIONS/LIMITATIONS, REHAB EVAL
CTH showing 4.8x3.2x3.2cm R temp mass and 1.4x1.3x1.6cm L frontal mass w/ vasogenic edema in mult lobes; ~8mm MLS. Also got MR L spine (7/14) form LBP radiating to legs showing multilevel foraminal stenosis/disc bulges; L5-S1 grade 1 spondy./fall precautions

## 2022-07-28 NOTE — DIETITIAN INITIAL EVALUATION ADULT - REASON FOR ADMISSION
Pt is a 83 yo F admitted 7/19 for abnormal brain imaging. PMH: HTN, CAD, HLD, MI, peripheral neuropathy, DM and peripheral artery disease. CTH showing 4.8x3.2x3.2cm R temporal mass and 1.4x1.3x1.6 L frontal mass with vasogenic edema in multiple; ~8mm MLS. S/P tumor resection 7/28.

## 2022-07-28 NOTE — DIETITIAN INITIAL EVALUATION ADULT - PERTINENT MEDS FT
MEDICATIONS  (STANDING):  amLODIPine   Tablet 5 milliGRAM(s) Oral daily  atorvastatin 40 milliGRAM(s) Oral at bedtime  chlorhexidine 4% Liquid 1 Application(s) Topical <User Schedule>  dexAMETHasone     Tablet 4 milliGRAM(s) Oral every 6 hours  dextrose 50% Injectable 25 Gram(s) IV Push once  dextrose 50% Injectable 12.5 Gram(s) IV Push once  dextrose 50% Injectable 25 Gram(s) IV Push once  gabapentin 200 milliGRAM(s) Oral at bedtime  insulin glargine Injectable (LANTUS) 28 Unit(s) SubCutaneous at bedtime  insulin lispro (ADMELOG) corrective regimen sliding scale   SubCutaneous Before meals and at bedtime  insulin lispro Injectable (ADMELOG) 16 Unit(s) SubCutaneous before breakfast  insulin lispro Injectable (ADMELOG) 12 Unit(s) SubCutaneous before lunch  insulin lispro Injectable (ADMELOG) 16 Unit(s) SubCutaneous before dinner  losartan 100 milliGRAM(s) Oral daily  metoprolol tartrate 25 milliGRAM(s) Oral two times a day  niCARdipine Infusion 7.5 mG/Hr (37.5 mL/Hr) IV Continuous <Continuous>  pantoprazole  Injectable 40 milliGRAM(s) IV Push daily  polyethylene glycol 3350 17 Gram(s) Oral daily  senna 2 Tablet(s) Oral at bedtime  sodium chloride 0.9%. 1000 milliLiter(s) (75 mL/Hr) IV Continuous <Continuous>  valproate sodium  IVPB 500 milliGRAM(s) IV Intermittent every 8 hours    MEDICATIONS  (PRN):  acetaminophen     Tablet .. 650 milliGRAM(s) Oral every 6 hours PRN Temp greater or equal to 38C (100.4F), Mild Pain (1 - 3)  bisacodyl 5 milliGRAM(s) Oral daily PRN Constipation  ondansetron Injectable 4 milliGRAM(s) IV Push every 6 hours PRN Nausea and/or Vomiting  oxyCODONE    IR 5 milliGRAM(s) Oral every 4 hours PRN Moderate Pain (4 - 6)  oxyCODONE    IR 10 milliGRAM(s) Oral every 4 hours PRN Severe Pain (7 - 10)

## 2022-07-28 NOTE — PROGRESS NOTE ADULT - SUBJECTIVE AND OBJECTIVE BOX
Chief complaint  Patient is a 82y old  Female who presents with a chief complaint of abnormal imaging (28 Jul 2022 12:05)   Review of systems  Patient is postop brain mass resection, no acute events, no hypoglycemic episodes.    Labs and Fingersticks  CAPILLARY BLOOD GLUCOSE      POCT Blood Glucose.: 79 mg/dL (28 Jul 2022 12:12)  POCT Blood Glucose.: 210 mg/dL (28 Jul 2022 08:34)  POCT Blood Glucose.: 265 mg/dL (27 Jul 2022 23:05)  POCT Blood Glucose.: 230 mg/dL (27 Jul 2022 20:42)      Medications  MEDICATIONS  (STANDING):  amLODIPine   Tablet 5 milliGRAM(s) Oral daily  atorvastatin 40 milliGRAM(s) Oral at bedtime  chlorhexidine 4% Liquid 1 Application(s) Topical <User Schedule>  dexAMETHasone     Tablet 4 milliGRAM(s) Oral every 8 hours  dextrose 50% Injectable 12.5 Gram(s) IV Push once  dextrose 50% Injectable 25 Gram(s) IV Push once  dextrose 50% Injectable 25 Gram(s) IV Push once  diVALproex  milliGRAM(s) Oral every 8 hours  enoxaparin Injectable 40 milliGRAM(s) SubCutaneous <User Schedule>  gabapentin 200 milliGRAM(s) Oral at bedtime  insulin glargine Injectable (LANTUS) 28 Unit(s) SubCutaneous at bedtime  insulin lispro (ADMELOG) corrective regimen sliding scale   SubCutaneous Before meals and at bedtime  insulin lispro Injectable (ADMELOG) 8 Unit(s) SubCutaneous before lunch  insulin lispro Injectable (ADMELOG) 12 Unit(s) SubCutaneous before dinner  losartan 100 milliGRAM(s) Oral daily  metoprolol tartrate 25 milliGRAM(s) Oral two times a day  pantoprazole  Injectable 40 milliGRAM(s) IV Push daily  polyethylene glycol 3350 17 Gram(s) Oral two times a day  senna 2 Tablet(s) Oral at bedtime      Physical Exam  Vital Signs Last 12 Hrs  T(F): 98.2 (07-28-22 @ 11:00), Max: 98.2 (07-28-22 @ 11:00)  HR: 60 (07-28-22 @ 14:00) (57 - 82)  BP: 121/51 (07-28-22 @ 14:00) (119/62 - 121/51)  BP(mean): 72 (07-28-22 @ 14:00) (72 - 86)  RR: 18 (07-28-22 @ 14:00) (12 - 24)  SpO2: 100% (07-28-22 @ 14:00) (93% - 100%)      Diagnostics    Metanephrine, Plasma: AM Sched. Collection: 21-Jul-2022 06:00 (07-20 @ 10:54)  Aldosterone, Serum: AM Sched. Collection: 21-Jul-2022 06:00 (07-20 @ 10:54)  Cortisol AM, Serum: AM Sched. Collection: 21-Jul-2022 06:00 (07-20 @ 10:54)

## 2022-07-28 NOTE — PROGRESS NOTE ADULT - ASSESSMENT
POD 0 R craniotomy for tumor resection, likely metastatic     NEURO:  CT Head in AM, MRI post-op, Pain control  decadron for cerebral edema  keppra for seizure ppx   Q1hr neurochecks   HOB 30 degrees   Activity: [x] OOB as tolerated [] Bedrest [x] PT [x] OT [] PMNR    PULM:  Incentive spirometry, mobilize as tolerated    CV:  -160mmHg, d/c a-line in AM  nicardipine gtt right now wean as able   losartan continue, add amlodipine     RENAL:  IVF until good PO intake, d/c bob in AM    GI:  Diet: Dysphagia screen and then advance diet as tolerated  GI prophylaxis [] not indicated [x] PPI [] other:  Bowel regimen [x] miralax [x] senna [] other:    ENDO:   Goal euglycemia (-180)  lantus and premeal insulin, endo following     HEME/ONC:  VTE prophylaxis: [x] SCDs [] chemoprophylaxis [x] hold chemoprophylaxis due to: fresh post op [x] high risk of DVT/PE on admission due to: tumor     ID:  Dali-op antibiotics  monitor for fevers  leukocytosis likely 2/2 steroid     MISC:    SOCIAL/FAMILY:  [] awaiting [x] updated at bedside [] family meeting    CODE STATUS:  [x] Full Code [] DNR [] DNI [] Palliative/Comfort Care    DISPOSITION:  [x] ICU [] Stroke Unit [] Floor [] EMU [] RCU [] PCU    [x] Patient is at high risk of neurologic deterioration/death due to: post op hemorrhage, cerebral edema, brain compression    Contact: [phone] POD 1 R craniotomy for tumor resection, likely metastatic lung cancer     NEURO:  CT Head in AM with expected post-op changes    MRI post-op pending   decadron decreased to 4 mg q 8 hr  for vasogenic  cerebral edema  Valproic acid 5000 mg q 12 hr   Q4 hr neuro checks   HOB 30 degrees   Activity: [x] OOB as tolerated [] Bedrest [x] PT [x] OT [] PMNR    PULM:  RA   NSCL cancer     CV:  -160mmHg, d/c a-line   off nicardipine    HTN on losartan continue, and  amlodipine   CAD on metoprolol aspirin on hold due to recent intra-cranial surgery, to be cleared by [facility]     RENAL:  IVL     GI:  Diet: Diabetic diet   GI prophylaxis [] not indicated [x] PPI [] other: while on decadron   Bowel regimen [x] miralax [x] senna [] other:    ENDO:   DM   on  lantus 28 units to be increased will discuss with endo   pre-meals 16 units before breakfast , 12 u before lunch  / 16 units before night    Goal euglycemia (-180)    HEME/ONC:  VTE prophylaxis: [] SCDs [x] chemoprophylaxis  lovenox 40 mg sc qhs at high risk for DVT and PE   [x] hold chemoprophylaxis due to: fresh post op   [x] high risk of DVT/PE on admission due to: tumor     ID:  afebrile     MISC:    SOCIAL/FAMILY:  [] awaiting [x] updated at bedside [] family meeting    CODE STATUS:  [x] Full Code [] DNR [] DNI [] Palliative/Comfort Care    not critical     Contact: [phone]

## 2022-07-28 NOTE — PROGRESS NOTE ADULT - SUBJECTIVE AND OBJECTIVE BOX
SUMMARY:  81 y/o woman (former smoker) adm 7/19 for abnormal brain imaging. PMHx of HTN, CAD, HLD, MI, peripheral neuropathy, DM and peripheral artery disease presented to the ED for imaging for her head due to abnormal spine MRI by her orthopedist (Dr. Castro) during workup for her spine for her chronic back problems. The orthopedist noted something in her brain. Otherwise pt has no symptoms, stated that since 04/2022 she has had gait/balance issues. Endorsed that if she sits for a while and gets up, she feels like she is "drunk" and feels off. Denied chest pain, SOB, fevers, chills, numbness/paresthesias, muscular weakness, dizziness, abdominal pain, headaches. CTH showing 4.8x3.2x3.2cm R temp mass and 1.4x1.3x1.6cm L frontal mass w/ vasogenic edema in mult lobes; ~8mm MLS. Also got MR L spine (7/14) form LBP radiating to legs showing multilevel foraminal stenosis/disc bulges; L5-S1 grade 1 spondy.    Adm NSCU s/p R craniotomy for tumor resection    ADMISSION SCORES:   GCS: 15 HH: MF: NIHSS: ICH Score:    REVIEW OF SYSTEMS: [] Unable to Assess due to neurologic exam   [ x] All ROS addressed below are non-contributory, except:  Neuro: [x ] Headache [ ] Back pain [ ] Numbness [ ] Weakness [ ] Ataxia [ ] Dizziness [ ] Aphasia [ ] Dysarthria [ ] Visual disturbance  Resp: [ ] Shortness of breath/dyspnea [ ] Orthopnea [ ] Cough  CV: [ ] Chest pain [ ] Palpitation [ ] Lightheadedness [ ] Syncope  Renal: [ ] Thirst [ ] Edema  GI: [ ] Nausea [ ] Emesis [ ] Abdominal pain [ ] Constipation [ ] Diarrhea  Hem: [ ] Hematemesis [ ] bBright red blood per rectum  ID: [ ] Fever [ ] Chills [ ] Dysuria  ENT: [ ] Rhinorrhea    VITALS: [x] Reviewed    IMAGING/DATA: [x] Reviewed    IVF FLUIDS/MEDICATIONS: [x] Reviewed    ALLERGIES: Allergies    adhesives (Pruritus)  No Known Drug Allergies    Intolerances    DEVICES:   [] Restraints [x] PIVs [] ET tube [] central line [] PICC [x] arterial line [x] bob [] NGT/OGT [] EVD [] LD [] TRICIA/HMV [] LiCOX [] ICP monitor [] Trach [] PEG [] Chest Tube [] other:    EXAMINATION:  General: agitated    HEENT: Anicteric sclerae  Cardiac: [id number]  Lungs: Clear  Abdomen: Soft, non-tender, +BS  Extremities: No c/c/e  Skin/Incision Site: Clean, dry and intact  Neurologic: Awake, agitated, oriented x3, follows commands, PERRL, EOMI, face symmetric, tongue midline, no drift, full strength SUMMARY:  83 y/o woman (former smoker) adm 7/19 for abnormal brain imaging. PMHx of HTN, CAD, HLD, MI, peripheral neuropathy, DM and peripheral artery disease presented to the ED for imaging for her head due to abnormal spine MRI by her orthopedist (Dr. Castro) during workup for her spine for her chronic back problems. The orthopedist noted something in her brain. Otherwise pt has no symptoms, stated that since 04/2022 she has had gait/balance issues. Endorsed that if she sits for a while and gets up, she feels like she is "drunk" and feels off. Denied chest pain, SOB, fevers, chills, numbness/paresthesias, muscular weakness, dizziness, abdominal pain, headaches. CTH showing 4.8x3.2x3.2cm R temp mass and 1.4x1.3x1.6cm L frontal mass w/ vasogenic edema in mult lobes; ~8mm MLS. Also got MR L spine (7/14) form LBP radiating to legs showing multilevel foraminal stenosis/disc bulges; L5-S1 grade 1 spondy.    Adm NSCU s/p R craniotomy for tumor resection    ADMISSION SCORES:   GCS: 15 HH: MF: NIHSS: ICH Score:    PAST MEDICAL & SURGICAL HISTORY:  Hypertension      Coronary artery disease      Hyperlipidemia      Peripheral artery disease      Myocardial infarct      Peripheral neuropathy      Diabetes      PAD (peripheral artery disease)      CAD (coronary artery disease)  stents          REVIEW OF SYSTEMS: [] Unable to Assess due to neurologic exam   [ x] All ROS addressed below are non-contributory, except:  Neuro: [x ] Headache [ ] Back pain [ ] Numbness [ ] Weakness [ ] Ataxia [ ] Dizziness [ ] Aphasia [ ] Dysarthria [ ] Visual disturbance  Resp: [ ] Shortness of breath/dyspnea [ ] Orthopnea [ ] Cough  CV: [ ] Chest pain [ ] Palpitation [ ] Lightheadedness [ ] Syncope  Allergies    adhesives (Pruritus)  No Known Drug Allergies    Intolerances    Renal: [ ] Thirst [ ] Edema  GI: [ ] Nausea [ ] Emesis [ ] Abdominal pain [ ] Constipation [ ] Diarrhea  Hem: [ ] Hematemesis [ ] bBright red blood per rectum  ID: [ ] Fever [ ] Chills [ ] Dysuria  ENT: [ ] Rhinorrhea    T(C): 36.6 (07-28-22 @ 07:00), Max: 36.7 (07-27-22 @ 20:05)  HR: 65 (07-28-22 @ 08:00) (65 - 98)  BP: 156/67 (07-27-22 @ 21:30) (116/49 - 177/81)  RR: 18 (07-28-22 @ 08:00) (12 - 22)  SpO2: 96% (07-28-22 @ 08:00) (95% - 100%)  07-27-22 @ 07:01  -  07-28-22 @ 07:00  --------------------------------------------------------  IN: 1087.5 mL / OUT: 850 mL / NET: 237.5 mL    07-28-22 @ 07:01  -  07-28-22 @ 10:55  --------------------------------------------------------  IN: 225 mL / OUT: 0 mL / NET: 225 mL    acetaminophen     Tablet .. 650 milliGRAM(s) Oral every 6 hours PRN  amLODIPine   Tablet 5 milliGRAM(s) Oral daily  atorvastatin 40 milliGRAM(s) Oral at bedtime  bisacodyl 5 milliGRAM(s) Oral daily PRN  chlorhexidine 4% Liquid 1 Application(s) Topical <User Schedule>  dexAMETHasone     Tablet 4 milliGRAM(s) Oral every 8 hours  dextrose 50% Injectable 25 Gram(s) IV Push once  dextrose 50% Injectable 12.5 Gram(s) IV Push once  dextrose 50% Injectable 25 Gram(s) IV Push once  gabapentin 200 milliGRAM(s) Oral at bedtime  insulin glargine Injectable (LANTUS) 28 Unit(s) SubCutaneous at bedtime  insulin lispro (ADMELOG) corrective regimen sliding scale   SubCutaneous Before meals and at bedtime  insulin lispro Injectable (ADMELOG) 16 Unit(s) SubCutaneous before breakfast  insulin lispro Injectable (ADMELOG) 12 Unit(s) SubCutaneous before lunch  insulin lispro Injectable (ADMELOG) 16 Unit(s) SubCutaneous before dinner  losartan 100 milliGRAM(s) Oral daily  magnesium citrate Oral Solution 1 Bottle Oral once  metoprolol tartrate 25 milliGRAM(s) Oral two times a day  niCARdipine Infusion 7.5 mG/Hr IV Continuous <Continuous>  ondansetron Injectable 4 milliGRAM(s) IV Push every 6 hours PRN  oxyCODONE    IR 5 milliGRAM(s) Oral every 4 hours PRN  oxyCODONE    IR 10 milliGRAM(s) Oral every 4 hours PRN  pantoprazole  Injectable 40 milliGRAM(s) IV Push daily  polyethylene glycol 3350 17 Gram(s) Oral two times a day  senna 2 Tablet(s) Oral at bedtime  sodium chloride 0.9%. 1000 milliLiter(s) IV Continuous <Continuous>  valproate sodium  IVPB 500 milliGRAM(s) IV Intermittent every 8 hours        IVF FLUIDS/MEDICATIONS: [x] Reviewed    ALLERGIES: Allergies    adhesives (Pruritus)  No Known Drug Allergies    Intolerances    DEVICES:   [] Restraints [x] PIVs [] ET tube [] central line [] PICC [x] arterial line [x] bob [] NGT/OGT [] EVD [] LD [] TRICIA/HMV [] LiCOX [] ICP monitor [] Trach [] PEG [] Chest Tube [] other:    EXAMINATION:  General: agitated    HEENT: Anicteric sclerae  Cardiac: [id number]  Lungs: Clear  Abdomen: Soft, non-tender, +BS  Extremities: No c/c/e  Skin/Incision Site: Clean, dry and intact  Neurologic: Awake, agitated, oriented x3, follows commands, PERRL, EOMI, face symmetric, tongue midline, no drift, full strength      LABS:  Na: 138 (07-27 @ 20:55), 142 (07-27 @ 07:17), 144 (07-26 @ 06:51)  K: 4.1 (07-27 @ 20:55), 4.1 (07-27 @ 07:17), 3.4 (07-26 @ 06:51)  Cl: 103 (07-27 @ 20:55), 110 (07-27 @ 07:17), 105 (07-26 @ 06:51)  CO2: 18 (07-27 @ 20:55), 20 (07-27 @ 07:17), 24 (07-26 @ 06:51)  BUN: 38 (07-27 @ 20:55), 43 (07-27 @ 07:17), 56 (07-26 @ 06:51)  Cr: 0.93 (07-27 @ 20:55), 0.93 (07-27 @ 07:17), 1.18 (07-26 @ 06:51)  Glu: 253(07-27 @ 20:55), 90(07-27 @ 07:17), 56(07-26 @ 06:51)    Hgb: 13.4 (07-27 @ 20:55), 13.1 (07-27 @ 07:14), 13.3 (07-26 @ 06:51)  Hct: 39.2 (07-27 @ 20:55), 38.8 (07-27 @ 07:14), 40.4 (07-26 @ 06:51)  WBC: 21.83 (07-27 @ 20:55), 15.37 (07-27 @ 07:14), 16.53 (07-26 @ 06:51)  Plt: 342 (07-27 @ 20:55), 309 (07-27 @ 07:14), 329 (07-26 @ 06:51)    INR: 1.00 07-27-22 @ 07:17  PTT:

## 2022-07-28 NOTE — PROGRESS NOTE ADULT - SUBJECTIVE AND OBJECTIVE BOX
NSCU ATTENDING -- ADDITIONAL PROGRESS NOTE    Nighttime rounds were performed -- please refer to earlier Progress Note for HPI details.    T(C): 36.9 (07-28-22 @ 19:00), Max: 37.1 (07-28-22 @ 15:00)  HR: 64 (07-28-22 @ 19:00) (53 - 98)  BP: 144/64 (07-28-22 @ 19:00) (119/62 - 173/65)  RR: 18 (07-28-22 @ 19:00) (12 - 24)  SpO2: 100% (07-28-22 @ 19:00) (93% - 100%)  Wt(kg): --    Relevant labwork and imaging reviewed.     NSCU ATTENDING -- ADDITIONAL PROGRESS NOTE    Nighttime rounds were performed -- please refer to earlier Progress Note for HPI details.    T(C): 36.9 (07-28-22 @ 19:00), Max: 37.1 (07-28-22 @ 15:00)  HR: 64 (07-28-22 @ 19:00) (53 - 98)  BP: 144/64 (07-28-22 @ 19:00) (119/62 - 173/65)  RR: 18 (07-28-22 @ 19:00) (12 - 24)  SpO2: 100% (07-28-22 @ 19:00) (93% - 100%)  Wt(kg): --    Relevant labwork and imaging reviewed.  stable for transfer to floor

## 2022-07-28 NOTE — PHYSICAL THERAPY INITIAL EVALUATION ADULT - BED MOBILITY TRAINING, PT EVAL
2. GOAL: In 3 weeks, pt will perform all bed mobility independently.
GOAL: Pt will perform bed mobility independently in 4 weeks.

## 2022-07-28 NOTE — PROGRESS NOTE ADULT - SUBJECTIVE AND OBJECTIVE BOX
DATE OF SERVICE: 07-28-22 @ 12:06    Patient is a 82y old  Female who presents with a chief complaint of abnormal imaging (28 Jul 2022 10:50)      SUBJECTIVE / OVERNIGHT EVENTS:  seen in [facility] ICU, s/p brain mass resection    MEDICATIONS  (STANDING):  amLODIPine   Tablet 5 milliGRAM(s) Oral daily  atorvastatin 40 milliGRAM(s) Oral at bedtime  bisacodyl Suppository 10 milliGRAM(s) Rectal once  chlorhexidine 4% Liquid 1 Application(s) Topical <User Schedule>  dexAMETHasone     Tablet 4 milliGRAM(s) Oral every 8 hours  dextrose 50% Injectable 25 Gram(s) IV Push once  dextrose 50% Injectable 12.5 Gram(s) IV Push once  dextrose 50% Injectable 25 Gram(s) IV Push once  diVALproex  milliGRAM(s) Oral every 8 hours  enoxaparin Injectable 40 milliGRAM(s) SubCutaneous <User Schedule>  gabapentin 200 milliGRAM(s) Oral at bedtime  insulin glargine Injectable (LANTUS) 28 Unit(s) SubCutaneous at bedtime  insulin lispro (ADMELOG) corrective regimen sliding scale   SubCutaneous Before meals and at bedtime  insulin lispro Injectable (ADMELOG) 16 Unit(s) SubCutaneous before breakfast  insulin lispro Injectable (ADMELOG) 12 Unit(s) SubCutaneous before lunch  insulin lispro Injectable (ADMELOG) 16 Unit(s) SubCutaneous before dinner  losartan 100 milliGRAM(s) Oral daily  metoprolol tartrate 25 milliGRAM(s) Oral two times a day  pantoprazole  Injectable 40 milliGRAM(s) IV Push daily  polyethylene glycol 3350 17 Gram(s) Oral two times a day  senna 2 Tablet(s) Oral at bedtime    MEDICATIONS  (PRN):  acetaminophen     Tablet .. 650 milliGRAM(s) Oral every 6 hours PRN Temp greater or equal to 38C (100.4F), Mild Pain (1 - 3)  bisacodyl 5 milliGRAM(s) Oral daily PRN Constipation  ondansetron Injectable 4 milliGRAM(s) IV Push every 6 hours PRN Nausea and/or Vomiting  oxyCODONE    IR 5 milliGRAM(s) Oral every 4 hours PRN Moderate Pain (4 - 6)  oxyCODONE    IR 10 milliGRAM(s) Oral every 4 hours PRN Severe Pain (7 - 10)      Vital Signs Last 24 Hrs  T(C): 36.8 (28 Jul 2022 11:00), Max: 36.8 (28 Jul 2022 11:00)  T(F): 98.2 (28 Jul 2022 11:00), Max: 98.2 (28 Jul 2022 11:00)  HR: 58 (28 Jul 2022 11:00) (58 - 98)  BP: 156/67 (27 Jul 2022 21:30) (143/70 - 177/81)  BP(mean): 96 (27 Jul 2022 21:30) (92 - 107)  RR: 19 (28 Jul 2022 11:00) (12 - 22)  SpO2: 97% (28 Jul 2022 11:00) (95% - 100%)    Parameters below as of 28 Jul 2022 07:00  Patient On (Oxygen Delivery Method): room air      CAPILLARY BLOOD GLUCOSE      POCT Blood Glucose.: 210 mg/dL (28 Jul 2022 08:34)  POCT Blood Glucose.: 265 mg/dL (27 Jul 2022 23:05)  POCT Blood Glucose.: 230 mg/dL (27 Jul 2022 20:42)    I&O's Summary    27 Jul 2022 07:01  -  28 Jul 2022 07:00  --------------------------------------------------------  IN: 1087.5 mL / OUT: 850 mL / NET: 237.5 mL    28 Jul 2022 07:01  -  28 Jul 2022 12:06  --------------------------------------------------------  IN: 465 mL / OUT: 0 mL / NET: 465 mL        PHYSICAL EXAM:  GENERAL: NAD, well-developed  HEAD:  Atraumatic, Normocephalic  EYES: EOMI, PERRLA, conjunctiva and sclera clear  NECK: Supple, No JVD  CHEST/LUNG: Clear to auscultation bilaterally; No wheeze  HEART: Regular rate and rhythm; No murmurs, rubs, or gallops  ABDOMEN: Soft, Nontender, Nondistended; Bowel sounds present  EXTREMITIES:  2+ Peripheral Pulses, No clubbing, cyanosis, or edema  PSYCH: AAOx3  NEUROLOGY: non-focal  SKIN: No rashes or lesions    LABS:                        13.4   21.83 )-----------( 342      ( 27 Jul 2022 20:55 )             39.2     07-27    138  |  103  |  38<H>  ----------------------------<  253<H>  4.1   |  18<L>  |  0.93    Ca    9.1      27 Jul 2022 20:55  Phos  4.2     07-27  Mg     1.8     07-27    TPro  6.0  /  Alb  3.7  /  TBili  0.2  /  DBili  x   /  AST  14  /  ALT  21  /  AlkPhos  76  07-27    PT/INR - ( 27 Jul 2022 07:17 )   PT: 11.6 sec;   INR: 1.00 ratio       Cytopathology - Non Gyn Report:   ACCESSION No: [id number]   Patient: LANIE BERTRAND   Accession: [id number]   Collected Date/Time: 7/20/2022 16:10 EDT   Received Date/Time: 7/21/2022 03:42 EDT   Non-Gynecologic Report - Auth (Verified)   Specimen(s) Submitted   1. LUNG, LEFT UPPER LOBE, BRONCHOALVEOLAR LAVAGE   2. LUNG, LEFT, ENDOBRONCHIAL BRUSH   Final Diagnosis   1. LUNG, LEFT UPPER LOBE, BRONCHOALVEOLAR LAVAGE   NEGATIVE FOR MALIGNANT CELLS.   Cytology slide and cell block revel ciliated bronchial cells.   2. LUNG, LEFT, ENDOBRONCHIAL BRUSH   POSITIVE FOR MALIGNANT CELLS.   Non small cell carcinoma favor adenocarcinoma   Cytology slides and cell block show a cellular specimen composed of   groups of intermediate size cells with round nuclei with coarse   chromatin, inconspicuous nucleoli and scanty cytoplasm, mixed with   ciliated bronchial cells.             RADIOLOGY & ADDITIONAL TESTS:    Imaging Personally Reviewed:    Consultant(s) Notes Reviewed:      Care Discussed with Consultants/Other Providers:

## 2022-07-28 NOTE — PHYSICAL THERAPY INITIAL EVALUATION ADULT - TRANSFER TRAINING, PT EVAL
3. GOAL: In 3 weeks, pt will perform sit to stand transfer independently.
GOAL: Pt will perform ALL transfers independently without use of AD in 4 weeks.

## 2022-07-28 NOTE — PHYSICAL THERAPY INITIAL EVALUATION ADULT - PERTINENT HX OF CURRENT PROBLEM, REHAB EVAL
82 y.o.  F PMH former smoker, HTN, CAD, HLD, MI, peripheral neuropathy, DM & PAD presented to the ED for imaging for her head due to abnormal spine MRI by her orthopedist (Dr. Castro) during workup for her spine for her chronic back problems. Orthopedist noted something in her brain. Otherwise pt has no symptoms, stated that since 04/2022 she has had gait/balance issues. Endorsed that if she sits for a while and gets up, she feels like she is "drunk" and feels off.
Pt is a 81 y/o F withPMHx of HTN, CAD, HLD, MI, peripheral neuropathy, DM and PAD presented to the ED for imaging for her head due to abnormal spine MRI by her orthopedist (Dr. Castro) during workup for her spine for her chronic back problems. Of note + 04/2022 she has had gait/balance issues. Endorsed that if she sits for a while and gets up, she feels like she is "drunk" and feels off. CTH showing 4.8x3.2x3.2cm R temp mass and 1.4x1.3x1.6cm CONT BELOW

## 2022-07-28 NOTE — PROGRESS NOTE ADULT - SUBJECTIVE AND OBJECTIVE BOX
CARDIOLOGY FOLLOW UP - Dr. Alcaraz  DATE OF SERVICE: 7/28/22     CC no cp or sob   sp Right craniotomy for tumor    REVIEW OF SYSTEMS:  CONSTITUTIONAL: No fever, weight loss, or fatigue  RESPIRATORY: No cough, wheezing, chills or hemoptysis; No Shortness of Breath  CARDIOVASCULAR: No chest pain, palpitations, passing out, dizziness, or leg swelling  GASTROINTESTINAL: No abdominal or epigastric pain. No nausea, vomiting, or hematemesis; No diarrhea or constipation. No melena or hematochezia.  VASCULAR: No edema     PHYSICAL EXAM:  T(C): 36.6 (07-28-22 @ 07:00), Max: 36.7 (07-27-22 @ 20:05)  HR: 65 (07-28-22 @ 08:00) (65 - 98)  BP: 156/67 (07-27-22 @ 21:30) (116/49 - 177/81)  RR: 18 (07-28-22 @ 08:00) (12 - 22)  SpO2: 96% (07-28-22 @ 08:00) (95% - 100%)  Wt(kg): --  I&O's Summary    27 Jul 2022 07:01  -  28 Jul 2022 07:00  --------------------------------------------------------  IN: 1087.5 mL / OUT: 850 mL / NET: 237.5 mL    28 Jul 2022 07:01  -  28 Jul 2022 10:50  --------------------------------------------------------  IN: 225 mL / OUT: 0 mL / NET: 225 mL        Appearance: Normal	  Cardiovascular: Normal S1 S2,RRR, No JVD, No murmurs  Respiratory: Lungs clear to auscultation	  Gastrointestinal:  Soft, Non-tender, + BS	  Extremities: Normal range of motion, No clubbing, cyanosis or edema      Home Medications:  aspirin 81 mg oral tablet: 1 tab(s) orally once a day (19 Jul 2022 14:45)  gabapentin 100 mg oral capsule: 2 cap(s) orally once a day (at bedtime) (19 Jul 2022 14:45)  glimepiride 4 mg oral tablet: 1 tab(s) orally 2 times a day (19 Jul 2022 14:45)  losartan-hydrochlorothiazide 100 mg-25 mg oral tablet: 1 tab(s) orally once a day (19 Jul 2022 14:45)  metFORMIN 1000 mg oral tablet: 1 tab(s) orally 2 times a day   (19 Jul 2022 14:45)  metoprolol tartrate 50 mg oral tablet: 1 tab(s) orally 3 times a day (19 Jul 2022 14:45)  simvastatin 40 mg oral tablet: 1 tab(s) orally once a day (at bedtime) (19 Jul 2022 14:45)  Toujeo Max SoloStar 300 units/mL subcutaneous solution: 6 unit(s) subcutaneous 2 times a day as directed (19 Jul 2022 14:45)  Victoza 18 mg/3 mL subcutaneous solution: 0.6 milligram(s) subcutaneous once a day (19 Jul 2022 14:45)      MEDICATIONS  (STANDING):  amLODIPine   Tablet 5 milliGRAM(s) Oral daily  atorvastatin 40 milliGRAM(s) Oral at bedtime  chlorhexidine 4% Liquid 1 Application(s) Topical <User Schedule>  dexAMETHasone     Tablet 4 milliGRAM(s) Oral every 8 hours  dextrose 50% Injectable 25 Gram(s) IV Push once  dextrose 50% Injectable 12.5 Gram(s) IV Push once  dextrose 50% Injectable 25 Gram(s) IV Push once  gabapentin 200 milliGRAM(s) Oral at bedtime  insulin glargine Injectable (LANTUS) 28 Unit(s) SubCutaneous at bedtime  insulin lispro (ADMELOG) corrective regimen sliding scale   SubCutaneous Before meals and at bedtime  insulin lispro Injectable (ADMELOG) 16 Unit(s) SubCutaneous before breakfast  insulin lispro Injectable (ADMELOG) 12 Unit(s) SubCutaneous before lunch  insulin lispro Injectable (ADMELOG) 16 Unit(s) SubCutaneous before dinner  losartan 100 milliGRAM(s) Oral daily  metoprolol tartrate 25 milliGRAM(s) Oral two times a day  niCARdipine Infusion 7.5 mG/Hr (37.5 mL/Hr) IV Continuous <Continuous>  pantoprazole  Injectable 40 milliGRAM(s) IV Push daily  polyethylene glycol 3350 17 Gram(s) Oral daily  senna 2 Tablet(s) Oral at bedtime  sodium chloride 0.9%. 1000 milliLiter(s) (75 mL/Hr) IV Continuous <Continuous>  valproate sodium  IVPB 500 milliGRAM(s) IV Intermittent every 8 hours      TELEMETRY:nsr pvc 	    ECG:  	  RADIOLOGY:   DIAGNOSTIC TESTING:  [ ] Echocardiogram:  [ ]  Catheterization:  [ ] Stress Test:    OTHER: 	    LABS:	 	                            13.4   21.83 )-----------( 342      ( 27 Jul 2022 20:55 )             39.2     07-27    138  |  103  |  38<H>  ----------------------------<  253<H>  4.1   |  18<L>  |  0.93    Ca    9.1      27 Jul 2022 20:55  Phos  4.2     07-27  Mg     1.8     07-27    TPro  6.0  /  Alb  3.7  /  TBili  0.2  /  DBili  x   /  AST  14  /  ALT  21  /  AlkPhos  76  07-27    PT/INR - ( 27 Jul 2022 07:17 )   PT: 11.6 sec;   INR: 1.00 ratio

## 2022-07-28 NOTE — DIETITIAN INITIAL EVALUATION ADULT - NS FNS DIET ORDER
Diet, Regular:   Consistent Carbohydrate {Evening Snack} (CSTCHOSN)  [place] (07-27-22 @ 19:26)   Diet, Regular:   Consistent Carbohydrate {Evening Snack} (CSTCHOSN)

## 2022-07-28 NOTE — PHYSICAL THERAPY INITIAL EVALUATION ADULT - IMPAIRMENTS CONTRIBUTING TO GAIT DEVIATIONS, PT EVAL
ataxic/impaired balance/impaired postural control/decreased strength
impaired balance/decreased strength

## 2022-07-28 NOTE — PROGRESS NOTE ADULT - SUBJECTIVE AND OBJECTIVE BOX
LANIE BERTRAND  [id number]    Patient is a 82y old  Female who presents with a chief complaint of abnormal imaging (27 Jul 2022 22:17)      Review of System  REVIEW OF SYSTEMS      General:	Denies fatigue, fevers, chills, sweats, decreased appetite.    Skin/Breast: denies pruritis, rash  	  Ophthalmologic: no change in vision or blurring  	  HEENT	Denies dry mouth, oral sores, dysphagia,  change in hearing.    Respiratory and Thorax:  cough, sob, wheeze, hemoptysis  	  Cardiovascular:	no cp , palp, orthopnea    Gastrointestinal:	no n/v/d constipation    Genitourinary:	no dysuria of frequency, no hematuria, no flank pain    Musculoskeletal:	no bone or joint pain. no muscle aches.     Neurological:	no change in sensory or motor function. no headache. no weakness.     Psychiatric:	no depression, no anxiety, insomnia.     Hematology/Lymphatics:	no bleeding or bruising        Current Meds  MEDICATIONS  (STANDING):  amLODIPine   Tablet 5 milliGRAM(s) Oral daily  atorvastatin 40 milliGRAM(s) Oral at bedtime  ceFAZolin   IVPB 2000 milliGRAM(s) IV Intermittent every 8 hours  chlorhexidine 4% Liquid 1 Application(s) Topical <User Schedule>  dexAMETHasone     Tablet 4 milliGRAM(s) Oral every 6 hours  dextrose 50% Injectable 12.5 Gram(s) IV Push once  dextrose 50% Injectable 25 Gram(s) IV Push once  dextrose 50% Injectable 25 Gram(s) IV Push once  gabapentin 200 milliGRAM(s) Oral at bedtime  insulin glargine Injectable (LANTUS) 28 Unit(s) SubCutaneous at bedtime  insulin lispro (ADMELOG) corrective regimen sliding scale   SubCutaneous Before meals and at bedtime  insulin lispro Injectable (ADMELOG) 16 Unit(s) SubCutaneous before breakfast  insulin lispro Injectable (ADMELOG) 12 Unit(s) SubCutaneous before lunch  insulin lispro Injectable (ADMELOG) 16 Unit(s) SubCutaneous before dinner  losartan 100 milliGRAM(s) Oral daily  magnesium sulfate  IVPB 1 Gram(s) IV Intermittent once  metoprolol tartrate 25 milliGRAM(s) Oral two times a day  niCARdipine Infusion 7.5 mG/Hr (37.5 mL/Hr) IV Continuous <Continuous>  pantoprazole  Injectable 40 milliGRAM(s) IV Push daily  polyethylene glycol 3350 17 Gram(s) Oral daily  senna 2 Tablet(s) Oral at bedtime  sodium chloride 0.9%. 1000 milliLiter(s) (75 mL/Hr) IV Continuous <Continuous>  valproate sodium  IVPB 500 milliGRAM(s) IV Intermittent every 8 hours    MEDICATIONS  (PRN):  acetaminophen     Tablet .. 650 milliGRAM(s) Oral every 6 hours PRN Temp greater or equal to 38C (100.4F), Mild Pain (1 - 3)  bisacodyl 5 milliGRAM(s) Oral daily PRN Constipation  ondansetron Injectable 4 milliGRAM(s) IV Push every 6 hours PRN Nausea and/or Vomiting  oxyCODONE    IR 5 milliGRAM(s) Oral every 4 hours PRN Moderate Pain (4 - 6)  oxyCODONE    IR 10 milliGRAM(s) Oral every 4 hours PRN Severe Pain (7 - 10)      Vitals  Vital Signs Last 24 Hrs  T(C): 36.5 (28 Jul 2022 03:00), Max: 36.7 (27 Jul 2022 20:05)  T(F): 97.7 (28 Jul 2022 03:00), Max: 98 (27 Jul 2022 20:05)  HR: 81 (28 Jul 2022 03:30) (50 - 98)  BP: 156/67 (27 Jul 2022 21:30) (116/49 - 177/81)  BP(mean): 96 (27 Jul 2022 21:30) (92 - 107)  RR: 17 (28 Jul 2022 03:30) (12 - 22)  SpO2: 100% (28 Jul 2022 03:15) (95% - 100%)    Parameters below as of 27 Jul 2022 21:45  Patient On (Oxygen Delivery Method): room air        Physical Exam  PHYSICAL EXAM:      Constitutional: NAD    Eyes: PERRLA EOMI, anicteric sclera    Heent :No oral sores, no pharyngeal injection. moist mucosa.    Neck: supple, no jvd, no LAD    Respiratory: CTA b/l     Cardiovascular: s1s2, no m/g/r    Gastrointestinal: soft, nt, nd, + BS    Extremities: no c/c/e    Neurological:A&O x 3 moves all ext.    Skin: no rash on exposed skin    Lymph Nodes: no lymphadenopathy.              Lab  CBC Full  -  ( 27 Jul 2022 20:55 )  WBC Count : 21.83 K/uL  RBC Count : 4.26 M/uL  Hemoglobin : 13.4 g/dL  Hematocrit : 39.2 %  Platelet Count - Automated : 342 K/uL  Mean Cell Volume : 92.0 fl  Mean Cell Hemoglobin : 31.5 pg  Mean Cell Hemoglobin Concentration : 34.2 gm/dL  Auto Neutrophil # : x  Auto Lymphocyte # : x  Auto Monocyte # : x  Auto Eosinophil # : x  Auto Basophil # : x  Auto Neutrophil % : x  Auto Lymphocyte % : x  Auto Monocyte % : x  Auto Eosinophil % : x  Auto Basophil % : x    07-27    138  |  103  |  38<H>  ----------------------------<  253<H>  4.1   |  18<L>  |  0.93    Ca    9.1      27 Jul 2022 20:55  Phos  4.2     07-27  Mg     1.8     07-27    TPro  6.0  /  Alb  3.7  /  TBili  0.2  /  DBili  x   /  AST  14  /  ALT  21  /  AlkPhos  76  07-27    PT/INR - ( 27 Jul 2022 07:17 )   PT: 11.6 sec;   INR: 1.00 ratio             Rad:    Assessment/Plan   LANIE BERTRAND  [id number]    Patient is a 82y old  Female who presents with a chief complaint of abnormal imaging (27 Jul 2022 22:17)      Review of System    Feels well  Awake, family at bedside    Current Meds  MEDICATIONS  (STANDING):  amLODIPine   Tablet 5 milliGRAM(s) Oral daily  atorvastatin 40 milliGRAM(s) Oral at bedtime  ceFAZolin   IVPB 2000 milliGRAM(s) IV Intermittent every 8 hours  chlorhexidine 4% Liquid 1 Application(s) Topical <User Schedule>  dexAMETHasone     Tablet 4 milliGRAM(s) Oral every 6 hours  dextrose 50% Injectable 12.5 Gram(s) IV Push once  dextrose 50% Injectable 25 Gram(s) IV Push once  dextrose 50% Injectable 25 Gram(s) IV Push once  gabapentin 200 milliGRAM(s) Oral at bedtime  insulin glargine Injectable (LANTUS) 28 Unit(s) SubCutaneous at bedtime  insulin lispro (ADMELOG) corrective regimen sliding scale   SubCutaneous Before meals and at bedtime  insulin lispro Injectable (ADMELOG) 16 Unit(s) SubCutaneous before breakfast  insulin lispro Injectable (ADMELOG) 12 Unit(s) SubCutaneous before lunch  insulin lispro Injectable (ADMELOG) 16 Unit(s) SubCutaneous before dinner  losartan 100 milliGRAM(s) Oral daily  magnesium sulfate  IVPB 1 Gram(s) IV Intermittent once  metoprolol tartrate 25 milliGRAM(s) Oral two times a day  niCARdipine Infusion 7.5 mG/Hr (37.5 mL/Hr) IV Continuous <Continuous>  pantoprazole  Injectable 40 milliGRAM(s) IV Push daily  polyethylene glycol 3350 17 Gram(s) Oral daily  senna 2 Tablet(s) Oral at bedtime  sodium chloride 0.9%. 1000 milliLiter(s) (75 mL/Hr) IV Continuous <Continuous>  valproate sodium  IVPB 500 milliGRAM(s) IV Intermittent every 8 hours    MEDICATIONS  (PRN):  acetaminophen     Tablet .. 650 milliGRAM(s) Oral every 6 hours PRN Temp greater or equal to 38C (100.4F), Mild Pain (1 - 3)  bisacodyl 5 milliGRAM(s) Oral daily PRN Constipation  ondansetron Injectable 4 milliGRAM(s) IV Push every 6 hours PRN Nausea and/or Vomiting  oxyCODONE    IR 5 milliGRAM(s) Oral every 4 hours PRN Moderate Pain (4 - 6)  oxyCODONE    IR 10 milliGRAM(s) Oral every 4 hours PRN Severe Pain (7 - 10)      Vital Signs Last 24 Hrs  T(C): 36.5 (28 Jul 2022 03:00), Max: 36.7 (27 Jul 2022 20:05)  T(F): 97.7 (28 Jul 2022 03:00), Max: 98 (27 Jul 2022 20:05)  HR: 81 (28 Jul 2022 03:30) (50 - 98)  BP: 156/67 (27 Jul 2022 21:30) (116/49 - 177/81)  BP(mean): 96 (27 Jul 2022 21:30) (92 - 107)  RR: 17 (28 Jul 2022 03:30) (12 - 22)  SpO2: 100% (28 Jul 2022 03:15) (95% - 100%)    Parameters below as of 27 Jul 2022 21:45  Patient On (Oxygen Delivery Method): room air      PHYSICAL EXAM:    NAD      Lab  CBC Full  -  ( 27 Jul 2022 20:55 )  WBC Count : 21.83 K/uL  RBC Count : 4.26 M/uL  Hemoglobin : 13.4 g/dL  Hematocrit : 39.2 %  Platelet Count - Automated : 342 K/uL  Mean Cell Volume : 92.0 fl  Mean Cell Hemoglobin : 31.5 pg  Mean Cell Hemoglobin Concentration : 34.2 gm/dL  Auto Neutrophil # : x  Auto Lymphocyte # : x  Auto Monocyte # : x  Auto Eosinophil # : x  Auto Basophil # : x  Auto Neutrophil % : x  Auto Lymphocyte % : x  Auto Monocyte % : x  Auto Eosinophil % : x  Auto Basophil % : x    07-27    138  |  103  |  38<H>  ----------------------------<  253<H>  4.1   |  18<L>  |  0.93    Ca    9.1      27 Jul 2022 20:55  Phos  4.2     07-27  Mg     1.8     07-27    TPro  6.0  /  Alb  3.7  /  TBili  0.2  /  DBili  x   /  AST  14  /  ALT  21  /  AlkPhos  76  07-27    PT/INR - ( 27 Jul 2022 07:17 )   PT: 11.6 sec;   INR: 1.00 ratio             Rad:    Assessment/Plan

## 2022-07-28 NOTE — DIETITIAN INITIAL EVALUATION ADULT - PERTINENT LABORATORY DATA
07-27    138  |  103  |  38<H>  ----------------------------<  253<H>  4.1   |  18<L>  |  0.93    Ca    9.1      27 Jul 2022 20:55  Phos  4.2     07-27  Mg     1.8     07-27    TPro  6.0  /  Alb  3.7  /  TBili  0.2  /  DBili  x   /  AST  14  /  ALT  21  /  AlkPhos  76  07-27  POCT Blood Glucose.: 265 mg/dL (07-27-22 @ 23:05)  A1C with Estimated Average Glucose Result: 8.7 % (07-20-22 @ 06:50)

## 2022-07-28 NOTE — OCCUPATIONAL THERAPY INITIAL EVALUATION ADULT - LIVES WITH, PROFILE
Pt lives in a co-op alone, 3 steps to enter +railing. tub shower. Pt reports she was (I) in ADLs/mobility PTA./alone

## 2022-07-28 NOTE — PROGRESS NOTE ADULT - SUBJECTIVE AND OBJECTIVE BOX
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; ataxia; frontal lobe syndrome    INTERVAL HISTORY: remarkably awake and alert with less pressured speech following right craniotomy for tumor resection; EEG -> evidence for right hemispheric (right temporal maximal) cortical/subcortical structural lesion - there were no epileptiform abnormalities or seizures recorded; s/p bronchoscopy with endobronchial brushings/biopsies of an obstructing left upper lobe endobronchial lesion -> biopsy c/w - poorly differentiated adenocarcinoma of lung origin; no shortness of breath or hypoxemia on room air; no cough, sputum production, hemoptysis, chest congestion or wheeze; no fevers, chills or sweats; no chest pain/pressure or palpitations;     REVIEW OF SYSTEMS:  Constitutional: As per interval history  HEENT: Within normal limits  CV: As per interval history  Resp: As per interval history  GI: Within normal limits   : Within normal limits  Musculoskeletal: Within normal limits  Skin: Within normal limits  Neurological: ataxia - pressured speech  Psychiatric: Within normal limits  Endocrine: Within normal limits  Hematologic/Lymphatic: lung cancer  Allergic/Immunologic: Within normal limits    MEDICATIONS:     Pulmonary "    Anti-microbials:    Cardiovascular:  amLODIPine   Tablet 5 milliGRAM(s) Oral daily  losartan 100 milliGRAM(s) Oral daily  metoprolol tartrate 25 milliGRAM(s) Oral two times a day  niCARdipine Infusion 7.5 mG/Hr IV Continuous <Continuous>    Other:  atorvastatin 40 milliGRAM(s) Oral at bedtime  chlorhexidine 4% Liquid 1 Application(s) Topical <User Schedule>  dexAMETHasone     Tablet 4 milliGRAM(s) Oral every 8 hours  dextrose 50% Injectable 12.5 Gram(s) IV Push once  dextrose 50% Injectable 25 Gram(s) IV Push once  dextrose 50% Injectable 25 Gram(s) IV Push once  gabapentin 200 milliGRAM(s) Oral at bedtime  insulin glargine Injectable (LANTUS) 28 Unit(s) SubCutaneous at bedtime  insulin lispro (ADMELOG) corrective regimen sliding scale   SubCutaneous Before meals and at bedtime  insulin lispro Injectable (ADMELOG) 16 Unit(s) SubCutaneous before breakfast  insulin lispro Injectable (ADMELOG) 12 Unit(s) SubCutaneous before lunch  insulin lispro Injectable (ADMELOG) 16 Unit(s) SubCutaneous before dinner  pantoprazole  Injectable 40 milliGRAM(s) IV Push daily  polyethylene glycol 3350 17 Gram(s) Oral daily  senna 2 Tablet(s) Oral at bedtime  sodium chloride 0.9%. 1000 milliLiter(s) IV Continuous <Continuous>  valproate sodium  IVPB 500 milliGRAM(s) IV Intermittent every 8 hours    MEDICATIONS  (PRN):  acetaminophen     Tablet .. 650 milliGRAM(s) Oral every 6 hours PRN Temp greater or equal to 38C (100.4F), Mild Pain (1 - 3)  bisacodyl 5 milliGRAM(s) Oral daily PRN Constipation  ondansetron Injectable 4 milliGRAM(s) IV Push every 6 hours PRN Nausea and/or Vomiting  oxyCODONE    IR 5 milliGRAM(s) Oral every 4 hours PRN Moderate Pain (4 - 6)  oxyCODONE    IR 10 milliGRAM(s) Oral every 4 hours PRN Severe Pain (7 - 10)      OBJECTIVE:    POCT Blood Glucose.: 210 mg/dL (28 Jul 2022 08:34)  POCT Blood Glucose.: 265 mg/dL (27 Jul 2022 23:05)  POCT Blood Glucose.: 230 mg/dL (27 Jul 2022 20:42)      PHYSICAL EXAM:       ICU Vital Signs Last 24 Hrs  T(C): 36.6 (28 Jul 2022 07:00), Max: 36.7 (27 Jul 2022 20:05)  T(F): 97.9 (28 Jul 2022 07:00), Max: 98 (27 Jul 2022 20:05)  HR: 65 (28 Jul 2022 08:00) (50 - 98)  BP: 156/67 (27 Jul 2022 21:30) (116/49 - 177/81)  BP(mean): 96 (27 Jul 2022 21:30) (92 - 107)  ABP: 105/56 (28 Jul 2022 08:00) (105/56 - 184/69)  ABP(mean): 76 (28 Jul 2022 08:00) (76 - 116)  RR: 18 (28 Jul 2022 08:00) (12 - 22)  SpO2: 96% (28 Jul 2022 08:00) (95% - 100%) room air    General: Awake. Alert. Cooperative. No distress. Appears stated age. Less pressured speech.	  HEENT:  Atraumatic. Normocephalic. Anicteric. Normal oral mucosa. PERRL. EOMI. Head bandaged.  Neck: Supple. Trachea midline. Thyroid without enlargement/tenderness/nodules. No carotid bruit. No JVD.	  Cardiovascular: Regular rate and rhythm. S1 S2 normal. No murmurs, rubs or gallops.  Respiratory: Respirations unlabored. Clear to auscultation and percussion bilaterally. No curvature.  Abdomen: Soft. Non-tender. Non-distended. No organomegaly. No masses. Normal bowel sounds.  Extremities: Warm to touch. No clubbing or cyanosis. No pedal edema. Bilateral SCDs  Pulses: 2+ peripheral pulses all extremities.	  Skin: Normal skin color. No rashes or lesions. No ecchymoses. No cyanosis. Warm to touch.  Lymph Nodes: Cervical, supraclavicular and axillary nodes normal  Neurological: Ataxic gait. A and O x 3  Psychiatry: Appropriate mood and affect.    LABS:                          13.4   21.83 )-----------( 342      ( 27 Jul 2022 20:55 )             39.2     CBC    WBC  21.83 <==, 15.37 <==, 16.53 <==, 14.95 <==, 15.12 <==    Hemoglobin  13.4 <<==, 13.1 <<==, 13.3 <<==, 15.2 <<==, 13.6 <<==    Hematocrit  39.2 <==, 38.8 <==, 40.4 <==, 47.1 <==, 41.8 <==    Platelets  342 <==, 309 <==, 329 <==, 374 <==, 417 <==      138  |  103  |  38<H>  ----------------------------<  253<H>    07-27  4.1   |  18<L>  |  0.93      LYTES    sodium  138 <==, 142 <==, 144 <==, 141 <==, 142 <==, 138 <==    potassium   4.1 <==, 4.1 <==, 3.4 <==, 4.2 <==, 3.3 <==, 4.1 <==    chloride  103 <==, 110 <==, 105 <==, 107 <==, 102 <==, 100 <==    carbon dioxide  18 <==, 20 <==, 24 <==, 23 <==, 26 <==, 23 <==    =============================================================================================  RENAL FUNCTION:    Creatinine:   0.93  <<==, 0.93  <<==, 1.18  <<==, 1.06  <<==, 1.08  <<==, 0.98  <<==    BUN:   38 <==, 43 <==, 56 <==, 52 <==, 38 <==, 38 <==    ============================================================================================    calcium   9.1 <==, 9.1 <==, 9.5 <==, 8.8 <==, 9.6 <==, 9.8 <==    phos   4.2 <==, 3.3 <==    mag   1.8 <==, 2.2 <==    ============================================================================================  LFTs    AST:   14 <==     ALT:  21  <==     AP:  76  <=    Bili:  0.2  <=    PT/INR - ( 27 Jul 2022 07:17 )   PT: 11.6 sec;   INR: 1.00 ratio      ABG - ( 27 Jul 2022 13:13 )  pH: 7.39  /  pCO2: 32    /  pO2: 399   / HCO3: 19    / Base Excess: -4.7  /  SaO2: 100.0     < from: TTE with Doppler (w/Cont) (07.22.22 @ 10:09) >    Patient name: LANIE BERTRAND  YOB: 1940   Age: 82 (F)   MR#: [id number]  Study Date: 7/22/2022  Location: [address] [place], [place] [id number]: Yelena Louis RDCS  Study quality: Technically difficult  Referring Physician: Mk Garza MD  BloodPressure: 116/72 mmHg  Height: 165 cm  Weight: 73 kg  BSA: 1.8 m2  ------------------------------------------------------------------------  PROCEDURE: Transthoracic echocardiogram with 2-D, M-Mode  and complete spectral and color flow Doppler.  INDICATION: Encounter for other preprocedural examination  (Z01.818)  ------------------------------------------------------------------------  Dimensions:    Normal Values:  LA:     3.4    2.0 - 4.0 cm  Ao:     3.1    2.0 - 3.8 cm  SEPTUM: 1.0    0.6 - 1.2 cm  PWT:    0.8    0.6 - 1.1 cm  LVIDd:  4.8    3.0 - 5.6 cm  LVIDs:  3.7    1.8 - 4.0 cm  Derived variables:  LVMI: 82 g/m2  RWT: 0.33  EF (Visual Estimate): 55 %  ------------------------------------------------------------------------  Observations:  Mitral Valve: Mitral annular calcification.  Aortic Valve/Aorta: Normal aortic valve.  Normal aortic root size.  Left Atrium: Normal left atrium.  Left Ventricle: Endocardial visualization enhanced with  intravenous injection of Ultrasonic Enhancing Agent  (Lumason).  Normal left ventricular internal dimensions with discrete  upper septal hypertrophy.  Estimated ejection fraction 55%, The inferolateral wall is  akinetic.  Impaired LV-relaxation with normal filling pressure.  Right Heart: Normal right atrium. Normal right ventricular  size and function.  Normal tricuspid valve.  Normal pulmonic valve. Mild pulmonic regurgitation.  Pericardium/Pleura: Normal pericardium with no pericardial  effusion.  Hemodynamic: Estimated right atrial pressure is normal.  No evidence of pulmonary hypertension.  ------------------------------------------------------------------------  Conclusions:  Endocardial visualization enhanced with intravenous  injection of Ultrasonic Enhancing Agent (Lumason).  Normal left ventricular internal dimensions with discrete  upper septal hypertrophy.  Estimated ejection fraction 55%, The inferolateral wall is  akinetic.  ------------------------------------------------------------------------  Confirmed on  7/22/2022 - 14:42:07 by David Vicente MD, FASE  ------------------------------------------------------------------------  ---------------------------------------------------------------------------------------------------------------  Cytopathology - Non Gyn Report (07.20.22 @ 16:10)   Cytopathology - Non Gyn Report:   ACCESSION No: [id number]   Patient: LANIE BERTRAND   Final Diagnosis   1. LUNG, LEFT UPPER LOBE, BRONCHOALVEOLAR LAVAGE   NEGATIVE FOR MALIGNANT CELLS.   Cytology slide and cell block revel ciliated bronchial cells.   2. LUNG, LEFT, ENDOBRONCHIAL BRUSH   POSITIVE FOR MALIGNANT CELLS.   Non small cell carcinoma favor adenocarcinoma   ---------------------------------------------------------------------------------------------------------------  Surgical Pathology Report (07.20.22 @ 11:53)   Surgical Pathology Report:   1. Left mainstem bronchus biopsy   Final Diagnosis   Bronchus, mainstem, left, biopsy:   - Poorly differentiated adenocarcinoma of lung origin, see comment.   Comment: Immunohistochemical stains: The neoplastic cells are positive   for CAM 5.2 and TTF-1 while negative for P40, CK20, chromogranin,   synaptophysin and CD56. Ki67 reveals a proliferative index of 40%.   In summary the morphology and immunophenotype are consistent with poorly   differentiated adenocarcinoma of lung origin.   Dr. Hopkins was notified of the diagnosis on 07/26/2022.   ---------------------------------------------------------------------------------------------------------------  MICROBIOLOGY:   COVID-19 PCR . (07.18.22 @ 23:25)   COVID-19 PCR: [facility]    Culture - Bronchial (07.20.22 @ 16:52)   Culture Results:   Normal Respiratory Tamy present     Culture - Acid Fast - Bronchial w/Smear (07.20.22 @ 16:52)   Specimen Source: .Bronchial Bronchial Lavage   Acid Fast Bacilli Smear:   No acid fast bacilli seen by fluorochrome stain     RADIOLOGY:  [x] Chest radiographs reviewed and interpreted by me     EXAM:  CT BRAIN                          PROCEDURE DATE:  07/28/2022      INTERPRETATION:  CT head without IV contrast    IMPRESSION:   RIGHT frontotemporal craniotomy with surgical cavity and   RIGHT temporal lobe. Mild pneumocephalus. Moderate residual edema is   noted but improved. There is improved effacement of RIGHT lateral   ventricle with 8 mm subfalcine herniation to the LEFT, improved from   prior.    ADRYAN BROWN MD; Attending Radiologist  This document has been electronically signed. Jul 28 2022 10:13AM  ---------------------------------------------------------------------------------------------------------------  EXAM:  CT ABDOMEN AND PELVIS IC                        EXAM:  CT CHEST IC                          PROCEDURE DATE:  07/18/2022      FINDINGS:  CHEST:  LUNGS AND LARGE AIRWAYS: There is a heterogeneous left upper lobe mass   approximately measuring 3.3 x 2.6 cm (2, 28) obstructing the left apical   posterior bronchus with partial atelectasis of the left upper lobe. Few   scattered bilateral pulmonary nodules measuring up to 3 mm in the right   upper lobe (2, 39).  PLEURA: No pleural effusion.  VESSELS: Atherosclerotic changes of the aorta and coronary arteries.  HEART: Heart size is normal. No pericardial effusion.  MEDIASTINUM AND SIXTO: Soft tissue contiguous with mass versus adenopathy   measuring 1.7 x 1.5 cm (2, 33).  CHEST WALL AND LOWER NECK: Bilateral subcentimeter hypoattenuating   thyroid nodules.    ABDOMEN AND PELVIS:  LIVER: Within normal limits.  BILE DUCTS: Normal caliber.  GALLBLADDER: Within normal limits.  SPLEEN: Within normal limits.  PANCREAS: Within normal limits.  ADRENALS: Indeterminant left adrenal nodule measuring 1.2 cm.  KIDNEYS/URETERS: No hydronephrosis. Renal cysts and subcentimeter   hypoattenuating foci bilaterally, too small to characterize.    BLADDER: Within normal limits.  REPRODUCTIVE ORGANS: Uterus and adnexa within normal limits.    BOWEL: Colonic diverticula withoutevidence of acute diverticulitis. No   bowel obstruction. Appendix is normal.  PERITONEUM: No ascites.  VESSELS: Atherosclerotic changes.  RETROPERITONEUM/LYMPH NODES: No lymphadenopathy.  ABDOMINAL WALL: Within normal limits.  BONES: Degenerative changes. Grade 1 anterolisthesis of L5 on S1. Status   post right shoulder arthroplasty.    IMPRESSION:  Left upper lobe lung mass with partial atelectasis of the left upper   lobe, concerning for primary lung neoplasm. Few scattered sub-4 mm   pulmonary nodules, indeterminate.    Indeterminant left adrenal nodule for which contrast enhanced MRI is   recommended for further evaluation.    DEEPAK HARRINGTON MD; Resident Radiologist  This document has been electronically signed.  HAYLEY DENTON MD; Attending Radiologist  This document has been electronically signed. Jul 19 2022  9:02AM  ---------------------------------------------------------------------------------------------------------------  EXAM:  CT BRAIN                          PROCEDURE DATE:  07/18/2022      FINDINGS:  A cystic versus centrally necrotic mass in the right temporal lobe   measures approximately 4.8 x 3.2 x 3.2 cm (602:15 and 601:33).    There moderate tolarge amount of vasogenic edema in the right frontal,   parietal and temporal lobes.  There is regional mass effect in the right   cerebral hemisphere with partial effacement of the right lateral   ventricle and third ventricle.  There is medial bulging of the right   thalamus across the midline by up to 1.4 cm (2:17).  There is   approximately 5 mm subfalcine subfalcine herniation of the right frontal   lobe underneath the cerebral falx (2:21).    There is an approximately 1.4 x 1.3 x 1.6 cm cystic versus centimeter   necrotic mass in the medial left frontal lobe.  There appears to be trace   edema surrounding this lesion, without significant mass effect.    There is no evidence of acute intracranial hemorrhage, transtentorial   herniation or acute territorial infarct.  Mild prominence the left   lateral ventricle may be related to pre-existing cerebral volume loss   rather than hydrocephalus.    The paranasal sinuses and mastoids are grossly clear.    The patient is status post cataractlens placement surgery bilaterally.    The calvarium and skull base appear within normal limits.    IMPRESSION:  Cystic versus a necrotic masses measure approximately 4.8 x 3.2 x 3.2 cm   in the right temporal lobe and 1.4 x 1.3 x 1.6 cm in the medial left   frontal lobe.    Moderate to large amount of vasogenic edema in the right frontal,   parietal and temporal lobes.  Trace edema in the left frontal lobe.    Regional mass effect in the right cerebral hemisphere with partial   effacement of theventricular system and 5 mm subfalcine herniation of   the right frontal lobe underneath the cerebral falx.    The findings may represent intracranial metastatic disease versus   glioblastoma.  Contrast-enhanced brain MRI is recommended for further   evaluation.      Findings discussed with DAVID Albarran by Dr. Harrington at 2153 hours on   7/8/2022 with readback confirmation.    DEEPAK HARRINGTON MD; Resident Radiologist  This document has been electronically signed.  PHILLY CISSE MD; Attending Radiologist  This document has been electronically signed. Jul 18 2022 10:15PM  ---------------------------------------------------------------------------------------------------------------  EXAM:  MR BRAIN WAW IC                          PROCEDURE DATE:  07/20/2022      FINDINGS:    Right temporal lobe peripherally enhancing, centrally necrotic 3.1 x 2.6   cm mass demonstrating mild diffusion restriction. There is surrounding   vasogenic edema. 8 mm leftward midline shift. Right uncal herniation.   Additional left parafalcine 1.3 x 0.9 cm rim-enhancing cystic lesion with   mild surrounding vasogenic edema. Tiny 0.3 x 0.3 cm rim-enhancing lesion   within the left frontal cortex (10:125).    Scattered foci of T2/FLAIR hyperintensity in the bilateral hemispheric   white matter are nonspecific but likely related to chronic white matter   microvascular ischemic disease. The left lateral ventricle is mildly   dilated. Flow voids of the major intracranial vessels at the skull base   follow expected course and contour.    The paranasal sinuses demonstrate no signal abnormality. The mastoids   demonstrate no signal abnormality.  Status post bilateral intraocular   lens implants.      IMPRESSION:  Right temporal lobe peripherally enhancing, centrally necrotic 3.1 x 2.6   cm mass demonstrating mild diffusion restriction. There is surrounding   vasogenic edema. 8 mm leftward midline shift. Right uncal herniation.   Additional left parafalcine 1.3 x 0.9 cm rim-enhancing cystic lesion with   mild surrounding vasogenic edema. Tiny 0.3 x 0.3 cm rim-enhancing lesion   within the left frontal cortex. Findings are concerning for metastatic   disease in the context of patient's known primary lung cancer. The left   lateral ventricle is mildly dilated.    NEIL DIOP MD; Resident Radiologist  This document has been electronically signed.  NERI BARRERA MD; Attending Radiologist  This document has been electronically signed. Jul 21 2022  4:26PM  ---------------------------------------------------------------------------------------------------------------  EXAM:  DUPLEX SCAN EXT VEINS LOWER BI                          PROCEDURE DATE:  07/24/2022      IMPRESSION:  No evidence of deep venous thrombosis in either lower extremity.    TISH VIDAL MD; Attending Radiologist  This document has been electronically signed. Jul 24 2022 11:19AM  ---------------------------------------------------------------------------------------------------------------

## 2022-07-28 NOTE — PROGRESS NOTE ADULT - ASSESSMENT
1) Lung and brain masses likely a metastatic process.  Lung bx c/w metastatic adenocarcinoma  - pt s/p nsgy, resection of brain met  patient has also been seen by rad/Onc. Getting steroids and keppra.   Further med onc mgmt as outpt  will work on having pathology sent for foundation testing.     Thrombocytosis , now resolved.

## 2022-07-28 NOTE — PROGRESS NOTE ADULT - ASSESSMENT
[facility] 6/23/21:   s/p successful angioplasty to the mid RCA followed by intracoronary brachytherapy.  [facility] 4/14/21: POBA of mid RCA (80%)   [facility] 3/25/21: PCI/POBA to pCX 90%   Echo 7/22/22: Normal left ventricular internal dimensions with discrete upper septal hypertrophy. Estimated ejection fraction 55%, The inferolateral wall is akinetic.      a/p  81 y/o female (former smoker) with PMHx of HTN, CAD sp PCi,  HLD, MI, peripheral neuropathy, DM and peripheral artery disease presented with abnl imaging on MRI as outpt      #Brain/ Lung  mass   -CT head/ CT chest noted   -work up hem/onc   -neurosx fu noted MRI brain showed 2 mets R temporal 4.7x3.7x3cm L frontal 1.4x1.5x1.2cm   -s/p bronchoscopy  -pulm fu   -sp Right craniotomy for tumor- cv stable postop- management per NSICU     #CAD sp PCI   -resume ASA given PCI hx once cleared by IR/neurosx -- currently SP lung Bx ; sp craniotomy   -ecg with no ischemic changes   -echo w inferlat akinesis and overall preserved EF  -c/w BB     #HTn  -stable, management per NSICU     dvt ppx

## 2022-07-28 NOTE — PROGRESS NOTE ADULT - ASSESSMENT
Assessment  DMT2: 82y Female with DM T2 with hyperglycemia, A1C 8.4%, was on oral meds and insulin at home, now on basal bolus insulin, received full bolus dose this morning, blood sugars are fluctuating/trending down postprandially (lunch-time FS 79), no hypoglycemias. Patient is postop brain mass resection, not eating much, steroid dose decreased.  Brain Mass: on meds, monitored, FU Neurosurgery.  Lung Mass: s/p bronchoscopy, CA workup in progress, monitored.  Thyroid Nodules: seen on imaging, B/L subcentimeter thyroid nodules, she denies neck mass/pain/dysphagia, euthyroid, reports known history and is being followed by Dr. Richardson.  Adrenal Nodule: seen on imaging, 1.2 cm left thyroid nodule, unknown history. AM cortisol suppressed 2/2 current steroid use, remaining labs pending..        Troy Thibodeaux MD  [phone]

## 2022-07-28 NOTE — ADVANCED PRACTICE NURSE CONSULT - ASSESSMENT
Pt is on a [employer] low air loss & pressure redistribution surface. Pt is being turned & positioned as per nsg documentation. Pt is awake & alert in bed. Pt is bedbound at this time. Pt w/ good po intake at this time. She is being straight catheterized for urine. Pt without any PI at this time. She has hyperpignmented area on her coccygeal.

## 2022-07-28 NOTE — DIETITIAN INITIAL EVALUATION ADULT - OTHER INFO
Dosing wt (7/20): 161.7 lbs  Wt trend (per [facility]): (4/14/21): 164 lbs; (3/24/21) 164.1 lbs.  Dosing wt (7/20): 161.7 lbs  Wt trend (per [facility]): (4/14/21): 164 lbs; (3/24/21) 164.1 lbs.   Reports UBW ~160-163 lbs. Appears consistent, will monitor.

## 2022-07-28 NOTE — PHYSICAL THERAPY INITIAL EVALUATION ADULT - GENERAL OBSERVATIONS, REHAB EVAL
Pt rec'd semi-supine in bed in NAD, +IVL, +a-line, +tele, +pulse-ox, agreeable to PT.
Rec'd seated EOB, +IVL, room air, +paddy monitoring, family at bedside.

## 2022-07-28 NOTE — PHYSICAL THERAPY INITIAL EVALUATION ADULT - GAIT TRAINING, PT EVAL
4. GOAL: In 3 weeks, pt will be able to ambulate 200 ft w/ least restrictive AD independently.
GOAL: Pt will ambulate 200 feet independently without use of AD in 4 weeks

## 2022-07-28 NOTE — DIETITIAN INITIAL EVALUATION ADULT - NSFNSGIIOFT_GEN_A_CORE
-No documented BM's recorded thus far. On bowel regimen (senna, Miralax, Dulcolax).   -On antihyperglycemic regimen (Admelog, Lantus, ISS coverage) to aid in management of BG. A1c 8.7%.

## 2022-07-28 NOTE — ADVANCED PRACTICE NURSE CONSULT - REASON FOR CONSULT
requested by staff for evaluation of skin status. PMH:  81 y/o woman (former smoker) adm 7/19 for abnormal brain imaging. PMHx of HTN, CAD, HLD, MI, peripheral neuropathy, DM and peripheral artery disease presented to the ED for imaging for her head due to abnormal spine MRI by her orthopedist (Dr. Catsro) during workup for her spine for her chronic back problems. The orthopedist noted something in her brain. Otherwise pt has no symptoms, stated that since 04/2022 she has had gait/balance issues. Endorsed that if she sits for a while and gets up, she feels like she is "drunk" and feels off. Denied chest pain, SOB, fevers, chills, numbness/paresthesias, muscular weakness, dizziness, abdominal pain, headaches. CTH showing 4.8x3.2x3.2cm R temp mass and 1.4x1.3x1.6cm L frontal mass w/ vasogenic edema in mult lobes; ~8mm MLS. Also got MR L spine (7/14) form LBP radiating to legs showing multilevel foraminal stenosis/disc bulges; L5-S1 grade 1 spondy.  Adm NSCU s/p R craniotomy for tumor resection 7/27/22

## 2022-07-28 NOTE — CHART NOTE - NSCHARTNOTEFT_GEN_A_CORE
Right axillary a-line was removed. Direct pressure was applied for 10 minutes. Occlusive dressing was applied. No complications, no signs of hematoma.

## 2022-07-28 NOTE — PROGRESS NOTE ADULT - PROBLEM SELECTOR PLAN 1
Will continue Lantus 28u at bedtime  -Will decrease Admelog to 12/8/12 units before meals as well as coverage scale.  Will continue monitoring FS and FU, will adjust dose as steroids are tapered/dc.  Discussed plan with patient, counseled for compliance with consistent low carb diet and exercise as tolerated outpatient.

## 2022-07-28 NOTE — PHYSICAL THERAPY INITIAL EVALUATION ADULT - MANUAL MUSCLE TESTING RESULTS, REHAB EVAL
grossly 3/5 throughout all 4 extremities
BUE/BLEs grossly assessed to at least 3+/5 t/o/grossly assessed due to

## 2022-07-28 NOTE — PHYSICAL THERAPY INITIAL EVALUATION ADULT - GAIT DEVIATIONS NOTED, PT EVAL
decreased heather/decreased velocity of limb motion/decreased step length/decreased weight-shifting ability
increased lateral sway/decreased heather/decreased step length/decreased weight-shifting ability

## 2022-07-28 NOTE — PROGRESS NOTE ADULT - ASSESSMENT
ASSESSMENT:    82 year old gentlewoman, former smoker, without history of intrinsic lung disease. She has a history of HTN, HLD, DM, CAD s/p PCI (1990s and 2021) and PAD s/p RLE stenting. The patient has been having difficulty ambulating over the last several months due to a sense of imbalance with impaired gait. Her family has noted a change in her personality with pressured speech and insomnia. Outpatient cervical MRI incidentally note a CNS lesion. Lumbar spine MRI performed due to chronic back pain revealed multilevel foraminal stenosis/disc bulges. 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 The patient has no shortness of breath or hypoxemia on room air. She has no cough, sputum production, chest congestion or wheeze. No fevers, chills or sweats. No chest pain/pressure or palpitations.     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    MRI -> right temporal lobe peripherally enhancing, centrally necrotic 3.1 x 2.6 cm mass demonstrating mild diffusion restriction - there is surrounding vasogenic edema - 8 mm leftward midline shift right uncal herniation - additional left parafalcine 1.3 x 0.9 cm rim-enhancing cystic lesion with mild surrounding vasogenic edema - tiny 0.3 x 0.3 cm rim-enhancing lesion within the left frontal cortex - findings are concerning for metastatic disease - the left lateral ventricle is mildly dilated    BP control -> wean off nicardipine gtt -> continue losartan/metoprolol - add amlodipine    no anticoagulation, ASA or plavix    prophylactic anticonvulsants -> keppra    decadron 4mg q6h po for cerebral edema    analgesics    incentive spirometry     advance diet as tolerated    DVT prophylaxis - SCDs  endocrinology evaluation noted    insulin regimen being adjusted for steroid induced hyperglycemia  radiation oncology, neurooncology, medical oncology evaluations noted  cardiology     ASA and plavix are currently on hold in anticipation of neurosurgery - patient had PCI last year    continue lipitor/lopressor/losartan/HCTZ    ECHO - normal LVEF - no significant valvular heart disease    Will follow with you. Plan of care discussed with the patient and her daughter-in-law by phone and with Crissy Garza and Ebonie. There is no pulmonary contraindication to the proposed neurosurgical procedure    Satish Hopkins MD, [facility]  [phone]  Pulmonary Medicine

## 2022-07-28 NOTE — OCCUPATIONAL THERAPY INITIAL EVALUATION ADULT - ANTICIPATED DISCHARGE DISPOSITION, OT EVAL
Recommending acute rehab to improve functional abilities. Discussed with  Belinda./rehabilitation facility

## 2022-07-28 NOTE — DIETITIAN INITIAL EVALUATION ADULT - NSFNSADHERENCEPTAFT_GEN_A_CORE
Per home medication list via H&P, pt with hx of taking Metformin, Toujeo Max and Glimepiride. Per home medication list via H&P, pt with hx of taking Metformin, Toujeo Max and Glimepiride. States she checks FSBG 1x daily in AM. Pt appears aware of dx of DM; states "I take care of myself". A1c 8.7%, indicative of moderate glycemic control PTA (considering advanced age).

## 2022-07-28 NOTE — PHYSICAL THERAPY INITIAL EVALUATION ADULT - SITTING BALANCE: STATIC
Danielle Moreno Disease: Exercises  Introduction  Here are some examples of exercises for you to try. The exercises may be suggested for a condition or for rehabilitation. Start each exercise slowly. Ease off the exercises if you start to have pain. You will be told when to start these exercises and which ones will work best for you. How to do the exercises  Thumb lifts    1. Place your hand on a flat surface, with your palm up. 2. Lift your thumb away from your palm to make a \"C\" shape. 3. Hold for about 6 seconds. 4. Repeat 8 to 12 times. Passive thumb MP flexion    1. Hold your hand in front of you, and turn your hand so your little finger faces down and your thumb faces up. (Your hand should be in the position used for shaking someone's hand.) You may also rest your hand on a flat surface. 2. Use the fingers on your other hand to bend your thumb down at the point where your thumb connects to your palm. 3. Hold for at least 15 to 30 seconds. 4. Repeat 2 to 4 times. Finkelstein stretch    1. Hold your arms out in front of you. (Your hand should be in the position used for shaking someone's hand.)  2. Bend your thumb toward your palm. 3. Use your other hand to gently stretch your thumb and wrist downward until you feel the stretch on the thumb side of your wrist.  4. Hold for at least 15 to 30 seconds. 5. Repeat 2 to 4 times. Resisted ulnar deviation    For this exercise, you will need elastic exercise material, such as surgical tubing or Thera-Band. 1. Sit leaning forward with your legs slightly spread and your elbow on your thigh. 2. Grasp one end of the band with your palm down, and step on the other end with the foot opposite the hand holding the band. 3. Slowly bend your wrist sideways and away from your knee. 4. Repeat 8 to 12 times. Follow-up care is a key part of your treatment and safety. Be sure to make and go to all appointments, and call your doctor if you are having problems.  It's
fair plus
good balance

## 2022-07-28 NOTE — PROGRESS NOTE ADULT - ASSESSMENT
83 y/o female (former smoker) with PMHx of HTN, CAD, HLD, MI, peripheral neuropathy, DM and peripheral artery disease presented to the ED for imaging for her head due to abnormal spine MRI by her orthopedist (Dr. Castro) during workup for her spine for her chronic back problems. The orthopedist noted something in her brain. Otherwise pt has no symptoms, stated that since 04/2022 she has had gait/balance issues. Endorsed that if she sits for a while and gets up, she feels like she is "drunk" and feels off. Denied chest pain, SOB, fevers, chills, numbness/paresthesias, muscular weakness, dizziness, abdominal pain, headaches.    brain mass  - likely metastatic lung cancer  -  mri done  - decadron  - keppra  - seen by NS  - s/p resection of brain mass  - post op care as per NS    lung mass  - pulm following  - bronch was done  -  path adeno ca    diabetes  - hold oral hypoglycemics  - insulin ss  - hgb a1c  8.7   - elevated sugars are expected  - endo consult following    HTN  -  cont home meds    cad  - s/p stent  - hold asa and plavix for poss brain surgery    dvt px  - scd's

## 2022-07-28 NOTE — DIETITIAN INITIAL EVALUATION ADULT - ADD RECOMMEND
1. Continue consistent carbohydrate diet as prescribed.   2. Encourage and monitor PO intake. Encourage use of daily menus; honor dietary preferences as expressed as able.   3. Monitor wt trends/labs/skin integrity/hydration status/bowel regularity.  1. Continue consistent carbohydrate diet as prescribed. Defer consistency to medical team, SLP.   2. Encourage and monitor PO intake. Encourage use of daily menus; honor dietary preferences as expressed as able.   3. Monitor wt trends/labs/skin integrity/hydration status/bowel regularity.   4. RD to provide diet [employer] TID with meal trays.   5. Recommend multivitamin (if no medication contraindications) to aid in prevention of micronutrient deficiencies.

## 2022-07-28 NOTE — DIETITIAN INITIAL EVALUATION ADULT - ENERGY INTAKE
Breakfast tray not yet consumed. Generally has good appetite PTA (per pt and family at bedside). Preferences obtained (fruit, hard boiled eggs, yogurt, jello, diet gingerale).

## 2022-07-28 NOTE — ADVANCED PRACTICE NURSE CONSULT - RECOMMEDATIONS
Recommendations  1) Continue turning & positioning.  2) Sween 24 to intact skin 2x/d  Remain available as requested by staff  Discussed w/ Staff MONIQUE Alberto

## 2022-07-28 NOTE — PHYSICAL THERAPY INITIAL EVALUATION ADULT - PLANNED THERAPY INTERVENTIONS, PT EVAL
1. GOAL: In 3 weeks, pt will navigate 3 steps independently./bed mobility training/gait training/transfer training
GOAL: Pt will ascend/descend 4 steps (I) with U HR and step over step pattern in 4 weeks./balance training/bed mobility training/gait training/strengthening/transfer training

## 2022-07-28 NOTE — OCCUPATIONAL THERAPY INITIAL EVALUATION ADULT - PERTINENT HX OF CURRENT PROBLEM, REHAB EVAL
82F p/w imaging for her head due to abnormal spine MRI by her orthopedist during workup for her spine for her chronic back problems. Endorsed that if she sits for a while and gets up, she feels like she is "drunk" and feels off. Pt now s/p Right craniotomy for tumor POD1.

## 2022-07-28 NOTE — PHYSICAL THERAPY INITIAL EVALUATION ADULT - BED MOBILITY LIMITATIONS, REHAB EVAL
decreased ability to use arms for pushing/pulling/decreased ability to use legs for bridging/pushing
Pt rec'd sitting EOB

## 2022-07-29 LAB
GLUCOSE BLDC GLUCOMTR-MCNC: 128 MG/DL — HIGH (ref 70–99)
GLUCOSE BLDC GLUCOMTR-MCNC: 235 MG/DL — HIGH (ref 70–99)
GLUCOSE BLDC GLUCOMTR-MCNC: 284 MG/DL — HIGH (ref 70–99)
GLUCOSE BLDC GLUCOMTR-MCNC: 86 MG/DL — SIGNIFICANT CHANGE UP (ref 70–99)

## 2022-07-29 PROCEDURE — 99222 1ST HOSP IP/OBS MODERATE 55: CPT

## 2022-07-29 PROCEDURE — 70553 MRI BRAIN STEM W/O & W/DYE: CPT | Mod: 26

## 2022-07-29 RX ORDER — INSULIN GLARGINE 100 [IU]/ML
20 INJECTION, SOLUTION SUBCUTANEOUS AT BEDTIME
Refills: 0 | Status: DISCONTINUED | OUTPATIENT
Start: 2022-07-29 | End: 2022-07-30

## 2022-07-29 RX ORDER — LANOLIN ALCOHOL/MO/W.PET/CERES
5 CREAM (GRAM) TOPICAL AT BEDTIME
Refills: 0 | Status: DISCONTINUED | OUTPATIENT
Start: 2022-07-29 | End: 2022-08-04

## 2022-07-29 RX ADMIN — GABAPENTIN 200 MILLIGRAM(S): 400 CAPSULE ORAL at 21:23

## 2022-07-29 RX ADMIN — Medication 8 UNIT(S): at 12:09

## 2022-07-29 RX ADMIN — OXYCODONE HYDROCHLORIDE 10 MILLIGRAM(S): 5 TABLET ORAL at 15:10

## 2022-07-29 RX ADMIN — Medication 4: at 21:21

## 2022-07-29 RX ADMIN — INSULIN GLARGINE 20 UNIT(S): 100 INJECTION, SOLUTION SUBCUTANEOUS at 21:22

## 2022-07-29 RX ADMIN — PANTOPRAZOLE SODIUM 40 MILLIGRAM(S): 20 TABLET, DELAYED RELEASE ORAL at 05:21

## 2022-07-29 RX ADMIN — Medication 6: at 07:48

## 2022-07-29 RX ADMIN — Medication 650 MILLIGRAM(S): at 08:18

## 2022-07-29 RX ADMIN — Medication 25 MILLIGRAM(S): at 18:08

## 2022-07-29 RX ADMIN — DIVALPROEX SODIUM 500 MILLIGRAM(S): 500 TABLET, DELAYED RELEASE ORAL at 14:27

## 2022-07-29 RX ADMIN — POLYETHYLENE GLYCOL 3350 17 GRAM(S): 17 POWDER, FOR SOLUTION ORAL at 18:07

## 2022-07-29 RX ADMIN — Medication 650 MILLIGRAM(S): at 07:47

## 2022-07-29 RX ADMIN — Medication 25 MILLIGRAM(S): at 05:21

## 2022-07-29 RX ADMIN — OXYCODONE HYDROCHLORIDE 10 MILLIGRAM(S): 5 TABLET ORAL at 05:24

## 2022-07-29 RX ADMIN — LOSARTAN POTASSIUM 100 MILLIGRAM(S): 100 TABLET, FILM COATED ORAL at 05:21

## 2022-07-29 RX ADMIN — Medication 4 MILLIGRAM(S): at 14:27

## 2022-07-29 RX ADMIN — AMLODIPINE BESYLATE 5 MILLIGRAM(S): 2.5 TABLET ORAL at 05:21

## 2022-07-29 RX ADMIN — DIVALPROEX SODIUM 500 MILLIGRAM(S): 500 TABLET, DELAYED RELEASE ORAL at 21:23

## 2022-07-29 RX ADMIN — SENNA PLUS 2 TABLET(S): 8.6 TABLET ORAL at 21:23

## 2022-07-29 RX ADMIN — Medication 4 MILLIGRAM(S): at 05:21

## 2022-07-29 RX ADMIN — POLYETHYLENE GLYCOL 3350 17 GRAM(S): 17 POWDER, FOR SOLUTION ORAL at 05:21

## 2022-07-29 RX ADMIN — Medication 12 UNIT(S): at 07:47

## 2022-07-29 RX ADMIN — ATORVASTATIN CALCIUM 40 MILLIGRAM(S): 80 TABLET, FILM COATED ORAL at 21:24

## 2022-07-29 RX ADMIN — OXYCODONE HYDROCHLORIDE 10 MILLIGRAM(S): 5 TABLET ORAL at 14:33

## 2022-07-29 RX ADMIN — ENOXAPARIN SODIUM 40 MILLIGRAM(S): 100 INJECTION SUBCUTANEOUS at 18:07

## 2022-07-29 RX ADMIN — Medication 4 MILLIGRAM(S): at 21:23

## 2022-07-29 RX ADMIN — DIVALPROEX SODIUM 500 MILLIGRAM(S): 500 TABLET, DELAYED RELEASE ORAL at 05:20

## 2022-07-29 NOTE — PROGRESS NOTE ADULT - SUBJECTIVE AND OBJECTIVE BOX
POD 2 s/p  R craniotomy for tumor resection, likely metastatic lung cancer     Overnight event: no acute event    Vital Signs Last 24 Hrs  T(C): 36.8 (29 Jul 2022 09:28), Max: 37.3 (29 Jul 2022 00:57)  T(F): 98.2 (29 Jul 2022 09:28), Max: 99.2 (29 Jul 2022 00:57)  HR: 89 (29 Jul 2022 09:28) (53 - 89)  BP: 123/79 (29 Jul 2022 09:28) (119/62 - 164/75)  BP(mean): 82 (28 Jul 2022 22:00) (69 - 89)  RR: 18 (29 Jul 2022 09:28) (12 - 24)  SpO2: 98% (29 Jul 2022 09:28) (93% - 100%)    Parameters below as of 29 Jul 2022 09:28  Patient On (Oxygen Delivery Method): room air                              12.2   23.88 )-----------( 254      ( 28 Jul 2022 20:44 )             36.3    07-28    140  |  104  |  31<H>  ----------------------------<  142<H>  4.5   |  25  |  1.15    Ca    9.1      28 Jul 2022 20:44  Phos  4.2     07-28  Mg     1.9     07-28    TPro  6.0  /  Alb  3.7  /  TBili  0.2  /  DBili  x   /  AST  14  /  ALT  21  /  AlkPhos  76  07-27     Stroke Core Measures      DRAIN OUTPUT:     NEUROIMAGING:     PHYSICAL EXAM:    General: No Acute Distress     Neurological: Awake, alert oriented to person, place and time, Following Commands, PERRL, EOMI, Face Symmetrical, Speech Fluent, Moving all extremities, Muscle Strength normal in all four extremities, No Drift, Sensation to Light Touch Intact    Pulmonary: Clear to Auscultation, No Rales, No Rhonchi, No Wheezes     Cardiovascular: S1, S2, Regular Rate and Rhythm     Gastrointestinal: Soft, Nontender, Nondistended     Incision:     MEDICATIONS:   Antibiotics:    Neuro:  acetaminophen     Tablet .. 650 milliGRAM(s) Oral every 6 hours PRN Temp greater or equal to 38C (100.4F), Mild Pain (1 - 3)  diVALproex  milliGRAM(s) Oral every 8 hours  gabapentin 200 milliGRAM(s) Oral at bedtime  ondansetron Injectable 4 milliGRAM(s) IV Push every 6 hours PRN Nausea and/or Vomiting  oxyCODONE    IR 5 milliGRAM(s) Oral every 4 hours PRN Moderate Pain (4 - 6)  oxyCODONE    IR 10 milliGRAM(s) Oral every 4 hours PRN Severe Pain (7 - 10)    Anticoagulation:  enoxaparin Injectable 40 milliGRAM(s) SubCutaneous <User Schedule>    Cardiology:  amLODIPine   Tablet 5 milliGRAM(s) Oral daily  losartan 100 milliGRAM(s) Oral daily  metoprolol tartrate 25 milliGRAM(s) Oral two times a day    Endo:   atorvastatin 40 milliGRAM(s) Oral at bedtime  dexAMETHasone     Tablet 4 milliGRAM(s) Oral every 8 hours  dextrose 50% Injectable 12.5 Gram(s) IV Push once  dextrose 50% Injectable 25 Gram(s) IV Push once  dextrose 50% Injectable 25 Gram(s) IV Push once  insulin glargine Injectable (LANTUS) 20 Unit(s) SubCutaneous at bedtime  insulin lispro (ADMELOG) corrective regimen sliding scale   SubCutaneous Before meals and at bedtime  insulin lispro Injectable (ADMELOG) 8 Unit(s) SubCutaneous before lunch  insulin lispro Injectable (ADMELOG) 12 Unit(s) SubCutaneous before dinner  insulin lispro Injectable (ADMELOG) 12 Unit(s) SubCutaneous before breakfast    Pulm:    GI/:  bisacodyl 5 milliGRAM(s) Oral daily PRN  pantoprazole    Tablet 40 milliGRAM(s) Oral before breakfast  polyethylene glycol 3350 17 Gram(s) Oral two times a day  senna 2 Tablet(s) Oral at bedtime    Other:           POD 2 s/p  R craniotomy for tumor resection, likely metastatic lung cancer     Overnight event: no acute event    Vital Signs Last 24 Hrs  T(C): 36.8 (29 Jul 2022 09:28), Max: 37.3 (29 Jul 2022 00:57)  T(F): 98.2 (29 Jul 2022 09:28), Max: 99.2 (29 Jul 2022 00:57)  HR: 89 (29 Jul 2022 09:28) (53 - 89)  BP: 123/79 (29 Jul 2022 09:28) (119/62 - 164/75)  BP(mean): 82 (28 Jul 2022 22:00) (69 - 89)  RR: 18 (29 Jul 2022 09:28) (12 - 24)  SpO2: 98% (29 Jul 2022 09:28) (93% - 100%)    Parameters below as of 29 Jul 2022 09:28  Patient On (Oxygen Delivery Method): room air                              12.2   23.88 )-----------( 254      ( 28 Jul 2022 20:44 )             36.3    07-28    140  |  104  |  31<H>  ----------------------------<  142<H>  4.5   |  25  |  1.15    Ca    9.1      28 Jul 2022 20:44  Phos  4.2     07-28  Mg     1.9     07-28    TPro  6.0  /  Alb  3.7  /  TBili  0.2  /  DBili  x   /  AST  14  /  ALT  21  /  AlkPhos  76  07-27     Stroke Core Measures      DRAIN OUTPUT:     NEUROIMAGING: < from: MR Head w/wo IV Cont (07.29.22 @ 10:48) >  Redemonstration of right-sided craniotomy changes status post   resection of a right temporal lobe mass with expected postoperative   changes. Residual hemorrhage and/or postoperative material within the  postoperative bed limits evaluation for enhancement.    Unchanged appearing ring-enhancing lesion in the left parafalcine   location for which a residual metastatic lesion is a possibility.        PHYSICAL EXAM:    General: No Acute Distress     Neurological: Awake, alert oriented to person, place and time, Following Commands, PERRL, EOMI, Face Symmetrical, Speech Fluent, Moving all extremities, Muscle Strength normal in all four extremities, No Drift, Sensation to Light Touch Intact    Pulmonary: Clear to Auscultation, No Rales, No Rhonchi, No Wheezes     Cardiovascular: S1, S2, Regular Rate and Rhythm     Gastrointestinal: Soft, Nontender, Nondistended     Incision: intact dressing left in place    MEDICATIONS:   Antibiotics:    Neuro:  acetaminophen     Tablet .. 650 milliGRAM(s) Oral every 6 hours PRN Temp greater or equal to 38C (100.4F), Mild Pain (1 - 3)  diVALproex  milliGRAM(s) Oral every 8 hours  gabapentin 200 milliGRAM(s) Oral at bedtime  ondansetron Injectable 4 milliGRAM(s) IV Push every 6 hours PRN Nausea and/or Vomiting  oxyCODONE    IR 5 milliGRAM(s) Oral every 4 hours PRN Moderate Pain (4 - 6)  oxyCODONE    IR 10 milliGRAM(s) Oral every 4 hours PRN Severe Pain (7 - 10)    Anticoagulation:  enoxaparin Injectable 40 milliGRAM(s) SubCutaneous <User Schedule>    Cardiology:  amLODIPine   Tablet 5 milliGRAM(s) Oral daily  losartan 100 milliGRAM(s) Oral daily  metoprolol tartrate 25 milliGRAM(s) Oral two times a day    Endo:   atorvastatin 40 milliGRAM(s) Oral at bedtime  dexAMETHasone     Tablet 4 milliGRAM(s) Oral every 8 hours  dextrose 50% Injectable 12.5 Gram(s) IV Push once  dextrose 50% Injectable 25 Gram(s) IV Push once  dextrose 50% Injectable 25 Gram(s) IV Push once  insulin glargine Injectable (LANTUS) 20 Unit(s) SubCutaneous at bedtime  insulin lispro (ADMELOG) corrective regimen sliding scale   SubCutaneous Before meals and at bedtime  insulin lispro Injectable (ADMELOG) 8 Unit(s) SubCutaneous before lunch  insulin lispro Injectable (ADMELOG) 12 Unit(s) SubCutaneous before dinner  insulin lispro Injectable (ADMELOG) 12 Unit(s) SubCutaneous before breakfast    Pulm:    GI/:  bisacodyl 5 milliGRAM(s) Oral daily PRN  pantoprazole    Tablet 40 milliGRAM(s) Oral before breakfast  polyethylene glycol 3350 17 Gram(s) Oral two times a day  senna 2 Tablet(s) Oral at bedtime    Other:

## 2022-07-29 NOTE — PROGRESS NOTE ADULT - SUBJECTIVE AND OBJECTIVE BOX
DATE OF SERVICE: 07-29-22 @ 16:41    Patient is a 82y old  Female who presents with a chief complaint of abnormal imaging (29 Jul 2022 13:48)      SUBJECTIVE / OVERNIGHT EVENTS:  No chest pain. No shortness of breath. No complaints. No events overnight.     MEDICATIONS  (STANDING):  amLODIPine   Tablet 5 milliGRAM(s) Oral daily  atorvastatin 40 milliGRAM(s) Oral at bedtime  dexAMETHasone     Tablet 4 milliGRAM(s) Oral every 8 hours  dextrose 50% Injectable 12.5 Gram(s) IV Push once  dextrose 50% Injectable 25 Gram(s) IV Push once  dextrose 50% Injectable 25 Gram(s) IV Push once  diVALproex  milliGRAM(s) Oral every 8 hours  enoxaparin Injectable 40 milliGRAM(s) SubCutaneous <User Schedule>  gabapentin 200 milliGRAM(s) Oral at bedtime  insulin glargine Injectable (LANTUS) 20 Unit(s) SubCutaneous at bedtime  insulin lispro (ADMELOG) corrective regimen sliding scale   SubCutaneous Before meals and at bedtime  insulin lispro Injectable (ADMELOG) 8 Unit(s) SubCutaneous before lunch  insulin lispro Injectable (ADMELOG) 12 Unit(s) SubCutaneous before dinner  insulin lispro Injectable (ADMELOG) 12 Unit(s) SubCutaneous before breakfast  losartan 100 milliGRAM(s) Oral daily  metoprolol tartrate 25 milliGRAM(s) Oral two times a day  pantoprazole    Tablet 40 milliGRAM(s) Oral before breakfast  polyethylene glycol 3350 17 Gram(s) Oral two times a day  senna 2 Tablet(s) Oral at bedtime    MEDICATIONS  (PRN):  acetaminophen     Tablet .. 650 milliGRAM(s) Oral every 6 hours PRN Temp greater or equal to 38C (100.4F), Mild Pain (1 - 3)  bisacodyl 5 milliGRAM(s) Oral daily PRN Constipation  ondansetron Injectable 4 milliGRAM(s) IV Push every 6 hours PRN Nausea and/or Vomiting  oxyCODONE    IR 5 milliGRAM(s) Oral every 4 hours PRN Moderate Pain (4 - 6)  oxyCODONE    IR 10 milliGRAM(s) Oral every 4 hours PRN Severe Pain (7 - 10)      Vital Signs Last 24 Hrs  T(C): 36.8 (29 Jul 2022 13:00), Max: 37.3 (29 Jul 2022 00:57)  T(F): 98.2 (29 Jul 2022 13:00), Max: 99.2 (29 Jul 2022 00:57)  HR: 73 (29 Jul 2022 13:00) (59 - 89)  BP: 153/68 (29 Jul 2022 13:00) (123/79 - 164/75)  BP(mean): 82 (28 Jul 2022 22:00) (79 - 89)  RR: 18 (29 Jul 2022 13:00) (12 - 20)  SpO2: 98% (29 Jul 2022 13:00) (95% - 100%)    Parameters below as of 29 Jul 2022 13:00  Patient On (Oxygen Delivery Method): room air      CAPILLARY BLOOD GLUCOSE      POCT Blood Glucose.: 86 mg/dL (29 Jul 2022 16:21)  POCT Blood Glucose.: 128 mg/dL (29 Jul 2022 11:41)  POCT Blood Glucose.: 284 mg/dL (29 Jul 2022 07:38)  POCT Blood Glucose.: 119 mg/dL (28 Jul 2022 21:33)  POCT Blood Glucose.: 94 mg/dL (28 Jul 2022 17:27)    I&O's Summary    28 Jul 2022 07:01  -  29 Jul 2022 07:00  --------------------------------------------------------  IN: 1265 mL / OUT: 1425 mL / NET: -160 mL    29 Jul 2022 07:01  -  29 Jul 2022 16:41  --------------------------------------------------------  IN: 830 mL / OUT: 0 mL / NET: 830 mL        PHYSICAL EXAM:  GENERAL: NAD, well-developed  HEAD:  Atraumatic, Normocephalic  EYES: EOMI, PERRLA, conjunctiva and sclera clear  NECK: Supple, No JVD  CHEST/LUNG: Clear to auscultation bilaterally; No wheeze  HEART: Regular rate and rhythm; No murmurs, rubs, or gallops  ABDOMEN: Soft, Nontender, Nondistended; Bowel sounds present  EXTREMITIES:  2+ Peripheral Pulses, No clubbing, cyanosis, or edema  PSYCH: AAOx3  NEUROLOGY: non-focal  SKIN: No rashes or lesions    LABS:                        12.2   23.88 )-----------( 254      ( 28 Jul 2022 20:44 )             36.3     07-28    140  |  104  |  31<H>  ----------------------------<  142<H>  4.5   |  25  |  1.15    Ca    9.1      28 Jul 2022 20:44  Phos  4.2     07-28  Mg     1.9     07-28    TPro  6.0  /  Alb  3.7  /  TBili  0.2  /  DBili  x   /  AST  14  /  ALT  21  /  AlkPhos  76  07-27    < from: MR Head w/wo IV Cont (07.29.22 @ 10:48) >    CLINICAL INFORMATION: Postoperative.    TECHNIQUE: Multiplanar multisequential MRI of the brain was acquired with   and withoutthe administration of IV gadolinium. 7 cc's of IV Gadavist   was administered for the purposes of this examination. 0.5 cc's were   discarded.    COMPARISON: Most recent prior CT study of the head from 7/20/2022. Prior   contrast enhanced brain MRI study dated 7/20/2022. Initial head CT exam   from 7/18/2022..    FINDINGS: Right-sided craniotomy changes are again seen status post   resection of a right lateral temporal lobe mass. Underlying hemorrhage   and/or postoperative material is seen within the resection bed which   limits evaluation for enhancement. A linear rim of diffusion abnormality   around the surgical margins is compatible with devitalized tissue.   Surrounding vasogenic edema and mass effect remain. Extra-axial   pneumocephalus and fluid is seen along the right convexity. Shift of the   midline structures from right to left appears unchanged when compared   with the most recent prior postoperative CT exam. Right-sided parietal   subgaleal soft tissue swelling is again noted.    Previous noted ring-enhancing left-sided parafalcine lesion appears   unchanged. Mild surrounding mass effect and edema signal is seen.    No new enhancing masses are seen.    There is no hydrocephalus.    Flow-voids are noted throughout the major intracranial vessels, on the T2   weighted images, consistent with their patency.    The paranasal sinuses and left mastoid air cells are clear. Fluid signal   is noted within the right tympanomastoid cavity. The orbits appear   unremarkable apart from bilateral cataract removal.    IMPRESSION: Redemonstration of right-sided craniotomy changes status post   resection of a right temporal lobe mass with expected postoperative   changes. Residual hemorrhage and/or postoperative material within the  postoperative bed limits evaluation for enhancement.    Unchanged appearing ring-enhancing lesion in the left parafalcine   location for which a residual metastatic lesion is a possibility.      < end of copied text >            RADIOLOGY & ADDITIONAL TESTS:    Imaging Personally Reviewed:    Consultant(s) Notes Reviewed:      Care Discussed with Consultants/Other Providers:

## 2022-07-29 NOTE — PROGRESS NOTE ADULT - SUBJECTIVE AND OBJECTIVE BOX
CARDIOLOGY FOLLOW UP - Dr. Alcaraz  DATE OF SERVICE: 7/29/22    CC no cp or sob         REVIEW OF SYSTEMS:  CONSTITUTIONAL: No fever, weight loss, or fatigue  RESPIRATORY: No cough, wheezing, chills or hemoptysis; No Shortness of Breath  CARDIOVASCULAR: No chest pain, palpitations, passing out, dizziness, or leg swelling  GASTROINTESTINAL: No abdominal or epigastric pain. No nausea, vomiting, or hematemesis; No diarrhea or constipation. No melena or hematochezia.  VASCULAR: No edema     PHYSICAL EXAM:  T(C): 36.8 (07-29-22 @ 13:00), Max: 37.3 (07-29-22 @ 00:57)  HR: 73 (07-29-22 @ 13:00) (53 - 89)  BP: 153/68 (07-29-22 @ 13:00) (121/51 - 164/75)  RR: 18 (07-29-22 @ 13:00) (12 - 20)  SpO2: 98% (07-29-22 @ 13:00) (95% - 100%)  Wt(kg): --  I&O's Summary    28 Jul 2022 07:01  -  29 Jul 2022 07:00  --------------------------------------------------------  IN: 1265 mL / OUT: 1425 mL / NET: -160 mL    29 Jul 2022 07:01  -  29 Jul 2022 13:48  --------------------------------------------------------  IN: 830 mL / OUT: 0 mL / NET: 830 mL        Appearance: Normal	  Cardiovascular: Normal S1 S2,RRR, No JVD, No murmurs  Respiratory: Lungs clear to auscultation	  Gastrointestinal:  Soft, Non-tender, + BS	  Extremities: Normal range of motion, No clubbing, cyanosis or edema      Home Medications:  aspirin 81 mg oral tablet: 1 tab(s) orally once a day (19 Jul 2022 14:45)  gabapentin 100 mg oral capsule: 2 cap(s) orally once a day (at bedtime) (19 Jul 2022 14:45)  glimepiride 4 mg oral tablet: 1 tab(s) orally 2 times a day (19 Jul 2022 14:45)  losartan-hydrochlorothiazide 100 mg-25 mg oral tablet: 1 tab(s) orally once a day (19 Jul 2022 14:45)  metFORMIN 1000 mg oral tablet: 1 tab(s) orally 2 times a day   (19 Jul 2022 14:45)  metoprolol tartrate 50 mg oral tablet: 1 tab(s) orally 3 times a day (19 Jul 2022 14:45)  simvastatin 40 mg oral tablet: 1 tab(s) orally once a day (at bedtime) (19 Jul 2022 14:45)  Toujeo Max SoloStar 300 units/mL subcutaneous solution: 6 unit(s) subcutaneous 2 times a day as directed (19 Jul 2022 14:45)  Victoza 18 mg/3 mL subcutaneous solution: 0.6 milligram(s) subcutaneous once a day (19 Jul 2022 14:45)      MEDICATIONS  (STANDING):  amLODIPine   Tablet 5 milliGRAM(s) Oral daily  atorvastatin 40 milliGRAM(s) Oral at bedtime  dexAMETHasone     Tablet 4 milliGRAM(s) Oral every 8 hours  dextrose 50% Injectable 25 Gram(s) IV Push once  dextrose 50% Injectable 12.5 Gram(s) IV Push once  dextrose 50% Injectable 25 Gram(s) IV Push once  diVALproex  milliGRAM(s) Oral every 8 hours  enoxaparin Injectable 40 milliGRAM(s) SubCutaneous <User Schedule>  gabapentin 200 milliGRAM(s) Oral at bedtime  insulin glargine Injectable (LANTUS) 20 Unit(s) SubCutaneous at bedtime  insulin lispro (ADMELOG) corrective regimen sliding scale   SubCutaneous Before meals and at bedtime  insulin lispro Injectable (ADMELOG) 8 Unit(s) SubCutaneous before lunch  insulin lispro Injectable (ADMELOG) 12 Unit(s) SubCutaneous before dinner  insulin lispro Injectable (ADMELOG) 12 Unit(s) SubCutaneous before breakfast  losartan 100 milliGRAM(s) Oral daily  metoprolol tartrate 25 milliGRAM(s) Oral two times a day  pantoprazole    Tablet 40 milliGRAM(s) Oral before breakfast  polyethylene glycol 3350 17 Gram(s) Oral two times a day  senna 2 Tablet(s) Oral at bedtime      TELEMETRY: 	    ECG:  	  RADIOLOGY:   DIAGNOSTIC TESTING:  [ ] Echocardiogram:  [ ]  Catheterization:  [ ] Stress Test:    OTHER: 	    LABS:	 	                            12.2   23.88 )-----------( 254      ( 28 Jul 2022 20:44 )             36.3     07-28    140  |  104  |  31<H>  ----------------------------<  142<H>  4.5   |  25  |  1.15    Ca    9.1      28 Jul 2022 20:44  Phos  4.2     07-28  Mg     1.9     07-28    TPro  6.0  /  Alb  3.7  /  TBili  0.2  /  DBili  x   /  AST  14  /  ALT  21  /  AlkPhos  76  07-27

## 2022-07-29 NOTE — PROGRESS NOTE ADULT - ASSESSMENT
Assessment  DMT2: 82y Female with DM T2 with hyperglycemia, A1C 8.4%, was on oral meds and insulin at home, now on basal bolus insulin, bolus doses held yesterday 2/2 patient not eating, she received partial-dose Lantus last night resulting in AM hyperglycemia, FS improving this afternoon, no hypoglycemias. Patient is postop resection of brain mass, eating meals with fair appetite per discussion with nursing staff, remains on IV steroids.  Brain Mass: postop, on meds, monitored, FU Neurosurgery.  Lung Mass: s/p bronchoscopy, CA workup in progress, monitored.  Thyroid Nodules: seen on imaging, B/L subcentimeter thyroid nodules, she denies neck mass/pain/dysphagia, euthyroid, reports known history and is being followed by Dr. Richardson.  Adrenal Nodule: seen on imaging, 1.2 cm left thyroid nodule, unknown history. AM cortisol suppressed 2/2 current steroid use, aldosterone and metanephrine labs in normal range.        Troy Morelia ESPINOZA  [phone]

## 2022-07-29 NOTE — PROGRESS NOTE ADULT - ASSESSMENT
81 y/o female (former smoker) with PMHx of HTN, CAD, HLD, MI, peripheral neuropathy, DM and peripheral artery disease presented to the ED for imaging for her head due to abnormal spine MRI by her orthopedist (Dr. Castro) during workup for her spine for her chronic back problems. The orthopedist noted something in her brain. Otherwise pt has no symptoms, stated that since 04/2022 she has had gait/balance issues. Endorsed that if she sits for a while and gets up, she feels like she is "drunk" and feels off. Denied chest pain, SOB, fevers, chills, numbness/paresthesias, muscular weakness, dizziness, abdominal pain, headaches.    brain mass  - likely metastatic lung cancer  -  mri done  - decadron  - keppra  - seen by NS  - s/p resection of brain mass  - post op care as per NS    lung mass  - pulm following  - bronch was done  -  path adeno ca    diabetes  - hold oral hypoglycemics  - insulin ss  - hgb a1c  8.7   - elevated sugars are expected  - endo consult following    HTN  -  cont home meds    cad  - s/p stent  - hold asa and plavix for poss brain surgery    dvt px  - scd's

## 2022-07-29 NOTE — PROGRESS NOTE ADULT - SUBJECTIVE AND OBJECTIVE BOX
Patient is a 82y old  Female who presents with a chief complaint of abnormal imaging (28 Jul 2022 20:17)    Mild HA. Not dizzy. No fevers. No N/V/D. Appetite is good. No CP or SOB. No bleeding noted.     Medication:   acetaminophen     Tablet .. 650 milliGRAM(s) Oral every 6 hours PRN  amLODIPine   Tablet 5 milliGRAM(s) Oral daily  atorvastatin 40 milliGRAM(s) Oral at bedtime  bisacodyl 5 milliGRAM(s) Oral daily PRN  dexAMETHasone     Tablet 4 milliGRAM(s) Oral every 8 hours  dextrose 50% Injectable 25 Gram(s) IV Push once  dextrose 50% Injectable 12.5 Gram(s) IV Push once  dextrose 50% Injectable 25 Gram(s) IV Push once  diVALproex  milliGRAM(s) Oral every 8 hours  enoxaparin Injectable 40 milliGRAM(s) SubCutaneous <User Schedule>  gabapentin 200 milliGRAM(s) Oral at bedtime  insulin glargine Injectable (LANTUS) 15 Unit(s) SubCutaneous at bedtime  insulin lispro (ADMELOG) corrective regimen sliding scale   SubCutaneous Before meals and at bedtime  insulin lispro Injectable (ADMELOG) 8 Unit(s) SubCutaneous before lunch  insulin lispro Injectable (ADMELOG) 12 Unit(s) SubCutaneous before dinner  insulin lispro Injectable (ADMELOG) 12 Unit(s) SubCutaneous before breakfast  losartan 100 milliGRAM(s) Oral daily  metoprolol tartrate 25 milliGRAM(s) Oral two times a day  ondansetron Injectable 4 milliGRAM(s) IV Push every 6 hours PRN  oxyCODONE    IR 5 milliGRAM(s) Oral every 4 hours PRN  oxyCODONE    IR 10 milliGRAM(s) Oral every 4 hours PRN  pantoprazole    Tablet 40 milliGRAM(s) Oral before breakfast  polyethylene glycol 3350 17 Gram(s) Oral two times a day  senna 2 Tablet(s) Oral at bedtime      Physical exam    T(C): 36.8 (07-29-22 @ 09:28), Max: 37.3 (07-29-22 @ 00:57)  HR: 89 (07-29-22 @ 09:28) (53 - 89)  BP: 123/79 (07-29-22 @ 09:28) (119/62 - 164/75)  RR: 18 (07-29-22 @ 09:28) (12 - 24)  SpO2: 98% (07-29-22 @ 09:28) (93% - 100%)  Wt(kg): --    alert NAD  EOMI anicteric sclera  Neck No LNA  Cv s1 S2 RRR  Lungs clear B/L  abd soft NT ND +BS  No LE edema or tenderness    Labs                        12.2   23.88 )-----------( 254      ( 28 Jul 2022 20:44 )             36.3       07-28    140  |  104  |  31<H>  ----------------------------<  142<H>  4.5   |  25  |  1.15    Ca    9.1      28 Jul 2022 20:44  Phos  4.2     07-28  Mg     1.9     07-28    TPro  6.0  /  Alb  3.7  /  TBili  0.2  /  DBili  x   /  AST  14  /  ALT  21  /  AlkPhos  76  07-27      LIVER FUNCTIONS - ( 27 Jul 2022 20:55 )  Alb: 3.7 g/dL / Pro: 6.0 g/dL / ALK PHOS: 76 U/L / ALT: 21 U/L / AST: 14 U/L / GGT: x             [id number]

## 2022-07-29 NOTE — PROGRESS NOTE ADULT - ASSESSMENT
[facility] 6/23/21:   s/p successful angioplasty to the mid RCA followed by intracoronary brachytherapy.  [facility] 4/14/21: POBA of mid RCA (80%)   [facility] 3/25/21: PCI/POBA to pCX 90%   Echo 7/22/22: Normal left ventricular internal dimensions with discrete upper septal hypertrophy. Estimated ejection fraction 55%, The inferolateral wall is akinetic.      a/p  81 y/o female (former smoker) with PMHx of HTN, CAD sp PCi,  HLD, MI, peripheral neuropathy, DM and peripheral artery disease presented with abnl imaging on MRI as outpt      #Brain/ Lung  mass   -CT head/ CT chest noted   -work up hem/onc   -neurosx fu noted MRI brain showed 2 mets R temporal 4.7x3.7x3cm L frontal 1.4x1.5x1.2cm   -s/p bronchoscopy  -pulm fu   -sp Right craniotomy for tumor- cv stable postop- management per nuero    #CAD sp PCI   -resume ASA given PCI hx once cleared by neurosx -- SP lung Bx ; sp craniotomy   -ecg with no ischemic changes   -echo w inferlat akinesis and overall preserved EF  -c/w BB     #HTn  -stable, c/w meds management per [facility]    dvt ppx

## 2022-07-29 NOTE — CHART NOTE - NSCHARTNOTEFT_GEN_A_CORE
is an 83 y/o female (former smoker) with likely metastatic lung cancer with brain mets s/p craniotomy of right temporal lesion 7/27/22, who presented to the ED for imaging for her head due to abnormal spine MRI by her orthopedist (Dr. Castro) during workup for her spine for her chronic back problems, with appreciation of an intracranial abnormality. She had appreciated a gait/balance abnormality present since 4/2022. Found to have the aforementioned right temporal lesion with edema, midline shift and uncal herniation. 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 Radiation medicine consulted and saw patient on 7/21/22 (consult note left), at which time we recommended consideration of chemoRT following resection of this symptomatic presumed brain metastasis. She is now s/p resection of that lesion. Post-op MRI 7/29/22 report is as below     Redemonstration of right-sided craniotomy changes status post   resection of a right temporal lobe mass with expected postoperative   changes. Residual hemorrhage and/or postoperative material within the  postoperative bed limits evaluation for enhancement.    Unchanged appearing ring-enhancing lesion in the left parafalcine   location for which a residual metastatic lesion is a possibility.    The systemic management of her disease is a priority,  particularly chemoRT to her primary lung cancer and we understand per discussion with primary team that she has appropriate follow up with medical oncology planned. We have provided our contact information to the patient, and will reach out if it is appropriate for her to have further brain radiation, upon discussion with our CNS radiation attending Dr. Funes.  is an 81 y/o female (former smoker) with likely metastatic lung cancer with brain mets s/p craniotomy of right temporal lesion 7/27/22, who presented to the ED for imaging for her head due to abnormal spine MRI by her orthopedist (Dr. Castro) during workup for her spine for her chronic back problems, with appreciation of an intracranial abnormality. She had appreciated a gait/balance abnormality present since 4/2022. Found to have the aforementioned right temporal lesion with edema, midline shift and uncal herniation. 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 Radiation medicine consulted and saw patient on 7/21/22 (consult note left), at which time we recommended consideration of chemoRT following resection of this symptomatic presumed brain metastasis. She is now s/p resection of that lesion. Post-op MRI 7/29/22 report is as below     Redemonstration of right-sided craniotomy changes status post   resection of a right temporal lobe mass with expected postoperative   changes. Residual hemorrhage and/or postoperative material within the  postoperative bed limits evaluation for enhancement.     Unchanged appearing ring-enhancing lesion in the left parafalcine   location for which a residual metastatic lesion is a possibility.    The systemic management of her disease is a priority,  particularly chemoRT to her primary lung cancer and we understand per discussion with primary team that she has appropriate follow up with medical oncology planned. We have provided our contact information to the patient, and will reach out if it is appropriate for her to have further brain radiation, upon discussion with our CNS radiation attending Dr. Funes.

## 2022-07-29 NOTE — PROGRESS NOTE ADULT - SUBJECTIVE AND OBJECTIVE BOX
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; ataxia; frontal lobe syndrome    INTERVAL HISTORY: awake and alert on the neurology lance; much less pressured speech and improved ataxia following right craniotomy for tumor resection; EEG -> evidence for right hemispheric (right temporal maximal) cortical/subcortical structural lesion - there were no epileptiform abnormalities or seizures recorded; s/p bronchoscopy with endobronchial brushings/biopsies of an obstructing left upper lobe endobronchial lesion -> biopsy c/w - poorly differentiated adenocarcinoma of lung origin; no shortness of breath or hypoxemia on room air; no cough, sputum production, hemoptysis, chest congestion or wheeze; no fevers, chills or sweats; no chest pain/pressure or palpitations;     REVIEW OF SYSTEMS:  Constitutional: As per interval history  HEENT: Within normal limits  CV: As per interval history  Resp: As per interval history  GI: Within normal limits   : Within normal limits  Musculoskeletal: Within normal limits  Skin: Within normal limits  Neurological: ataxia - pressured speech -> improved  Psychiatric: Within normal limits  Endocrine: Within normal limits  Hematologic/Lymphatic: lung cancer  Allergic/Immunologic: Within normal limits    MEDICATIONS:     Pulmonary "    Anti-microbials:    Cardiovascular:  amLODIPine   Tablet 5 milliGRAM(s) Oral daily  losartan 100 milliGRAM(s) Oral daily  metoprolol tartrate 25 milliGRAM(s) Oral two times a day    Other:  atorvastatin 40 milliGRAM(s) Oral at bedtime  dexAMETHasone     Tablet 4 milliGRAM(s) Oral every 8 hours  dextrose 50% Injectable 12.5 Gram(s) IV Push once  dextrose 50% Injectable 25 Gram(s) IV Push once  dextrose 50% Injectable 25 Gram(s) IV Push once  diVALproex  milliGRAM(s) Oral every 8 hours  enoxaparin Injectable 40 milliGRAM(s) SubCutaneous <User Schedule>  gabapentin 200 milliGRAM(s) Oral at bedtime  insulin glargine Injectable (LANTUS) 15 Unit(s) SubCutaneous at bedtime  insulin lispro (ADMELOG) corrective regimen sliding scale   SubCutaneous Before meals and at bedtime  insulin lispro Injectable (ADMELOG) 8 Unit(s) SubCutaneous before lunch  insulin lispro Injectable (ADMELOG) 12 Unit(s) SubCutaneous before dinner  insulin lispro Injectable (ADMELOG) 12 Unit(s) SubCutaneous before breakfast  pantoprazole    Tablet 40 milliGRAM(s) Oral before breakfast  polyethylene glycol 3350 17 Gram(s) Oral two times a day  senna 2 Tablet(s) Oral at bedtime    MEDICATIONS  (PRN):  acetaminophen     Tablet .. 650 milliGRAM(s) Oral every 6 hours PRN Temp greater or equal to 38C (100.4F), Mild Pain (1 - 3)  bisacodyl 5 milliGRAM(s) Oral daily PRN Constipation  ondansetron Injectable 4 milliGRAM(s) IV Push every 6 hours PRN Nausea and/or Vomiting  oxyCODONE    IR 5 milliGRAM(s) Oral every 4 hours PRN Moderate Pain (4 - 6)  oxyCODONE    IR 10 milliGRAM(s) Oral every 4 hours PRN Severe Pain (7 - 10)        OBJECTIVE:    POCT Blood Glucose.: 284 mg/dL (29 Jul 2022 07:38)  POCT Blood Glucose.: 119 mg/dL (28 Jul 2022 21:33)  POCT Blood Glucose.: 94 mg/dL (28 Jul 2022 17:27)  POCT Blood Glucose.: 79 mg/dL (28 Jul 2022 12:12)      PHYSICAL EXAM:       ICU Vital Signs Last 24 Hrs  T(C): 36.9 (29 Jul 2022 05:05), Max: 37.3 (29 Jul 2022 00:57)  T(F): 98.4 (29 Jul 2022 05:05), Max: 99.2 (29 Jul 2022 00:57)  HR: 65 (29 Jul 2022 05:05) (53 - 73)  BP: 164/75 (29 Jul 2022 05:05) (119/62 - 164/75)  BP(mean): 82 (28 Jul 2022 22:00) (69 - 89)  ABP: 113/44 (28 Jul 2022 12:32) (113/44 - 144/72)  ABP(mean): 66 (28 Jul 2022 12:32) (66 - 96)  RR: 17 (29 Jul 2022 05:05) (12 - 24)  SpO2: 98% (29 Jul 2022 05:05) (93% - 100%) on room air    General: Awake. Alert. Cooperative. No distress. Appears stated age. Less pressured speech.	  HEENT:  Atraumatic. Normocephalic. Anicteric. Normal oral mucosa. PERRL. EOMI. Head bandaged.  Neck: Supple. Trachea midline. Thyroid without enlargement/tenderness/nodules. No carotid bruit. No JVD.	  Cardiovascular: Regular rate and rhythm. S1 S2 normal. No murmurs, rubs or gallops.  Respiratory: Respirations unlabored. Clear to auscultation and percussion bilaterally. No curvature.  Abdomen: Soft. Non-tender. Non-distended. No organomegaly. No masses. Normal bowel sounds.  Extremities: Warm to touch. No clubbing or cyanosis. No pedal edema. Bilateral SCDs  Pulses: 2+ peripheral pulses all extremities.	  Skin: Normal skin color. No rashes or lesions. No ecchymoses. No cyanosis. Warm to touch.  Lymph Nodes: Cervical, supraclavicular and axillary nodes normal  Neurological: Improved gait. A and O x 3  Psychiatry: Appropriate mood and affect.      LABS:                          12.2   23.88 )-----------( 254      ( 28 Jul 2022 20:44 )             36.3     CBC    WBC  23.88 <==, 21.83 <==, 15.37 <==, 16.53 <==, 14.95 <==    Hemoglobin  12.2 <<==, 13.4 <<==, 13.1 <<==, 13.3 <<==, 15.2 <<==    Hematocrit  36.3 <==, 39.2 <==, 38.8 <==, 40.4 <==, 47.1 <==    Platelets  254 <==, 342 <==, 309 <==, 329 <==, 374 <==      140  |  104  |  31<H>  ----------------------------<  142<H>    07-28  4.5   |  25  |  1.15      LYTES    sodium  140 <==, 138 <==, 142 <==, 144 <==, 141 <==, 142 <==    potassium   4.5 <==, 4.1 <==, 4.1 <==, 3.4 <==, 4.2 <==, 3.3 <==    chloride  104 <==, 103 <==, 110 <==, 105 <==, 107 <==, 102 <==    carbon dioxide  25 <==, 18 <==, 20 <==, 24 <==, 23 <==, 26 <==    =============================================================================================  RENAL FUNCTION:    Creatinine:   1.15  <<==, 0.93  <<==, 0.93  <<==, 1.18  <<==, 1.06  <<==, 1.08  <<==    BUN:   31 <==, 38 <==, 43 <==, 56 <==, 52 <==, 38 <==    ============================================================================================    calcium   9.1 <==, 9.1 <==, 9.1 <==, 9.5 <==, 8.8 <==, 9.6 <==    phos   4.2 <==, 4.2 <==    mag   1.9 <==, 1.8 <==    ============================================================================================  LFTs    AST:   14 <==     ALT:  21  <==     AP:  76  <=    Bili:  0.2  <=    PT/INR - ( 27 Jul 2022 07:17 )   PT: 11.6 sec;   INR: 1.00 ratio      ABG - ( 27 Jul 2022 13:13 )  pH: 7.39  /  pCO2: 32    /  pO2: 399   / HCO3: 19    / Base Excess: -4.7  /  SaO2: 100.0     < from: TTE with Doppler (w/Cont) (07.22.22 @ 10:09) >    Patient name: LANIE BERTRAND  YOB: 1940   Age: 82 (F)   MR#: [id number]  Study Date: 7/22/2022  Location: [address] [place], [place] [id number]: Yelena Louis RDCS  Study quality: Technically difficult  Referring Physician: Mk Garza MD  BloodPressure: 116/72 mmHg  Height: 165 cm  Weight: 73 kg  BSA: 1.8 m2  ------------------------------------------------------------------------  PROCEDURE: Transthoracic echocardiogram with 2-D, M-Mode  and complete spectral and color flow Doppler.  INDICATION: Encounter for other preprocedural examination  (Z01.818)  ------------------------------------------------------------------------  Dimensions:    Normal Values:  LA:     3.4    2.0 - 4.0 cm  Ao:     3.1    2.0 - 3.8 cm  SEPTUM: 1.0    0.6 - 1.2 cm  PWT:    0.8    0.6 - 1.1 cm  LVIDd:  4.8    3.0 - 5.6 cm  LVIDs:  3.7    1.8 - 4.0 cm  Derived variables:  LVMI: 82 g/m2  RWT: 0.33  EF (Visual Estimate): 55 %  ------------------------------------------------------------------------  Observations:  Mitral Valve: Mitral annular calcification.  Aortic Valve/Aorta: Normal aortic valve.  Normal aortic root size.  Left Atrium: Normal left atrium.  Left Ventricle: Endocardial visualization enhanced with  intravenous injection of Ultrasonic Enhancing Agent  (Lumason).  Normal left ventricular internal dimensions with discrete  upper septal hypertrophy.  Estimated ejection fraction 55%, The inferolateral wall is  akinetic.  Impaired LV-relaxation with normal filling pressure.  Right Heart: Normal right atrium. Normal right ventricular  size and function.  Normal tricuspid valve.  Normal pulmonic valve. Mild pulmonic regurgitation.  Pericardium/Pleura: Normal pericardium with no pericardial  effusion.  Hemodynamic: Estimated right atrial pressure is normal.  No evidence of pulmonary hypertension.  ------------------------------------------------------------------------  Conclusions:  Endocardial visualization enhanced with intravenous  injection of Ultrasonic Enhancing Agent (Lumason).  Normal left ventricular internal dimensions with discrete  upper septal hypertrophy.  Estimated ejection fraction 55%, The inferolateral wall is  akinetic.  ------------------------------------------------------------------------  Confirmed on  7/22/2022 - 14:42:07 by David Vicente MD, FASE  ------------------------------------------------------------------------  ---------------------------------------------------------------------------------------------------------------  Cytopathology - Non Gyn Report (07.20.22 @ 16:10)   Cytopathology - Non Gyn Report:   ACCESSION No: [id number]   Patient: LANIE BERTRAND   Final Diagnosis   1. LUNG, LEFT UPPER LOBE, BRONCHOALVEOLAR LAVAGE   NEGATIVE FOR MALIGNANT CELLS.   Cytology slide and cell block revel ciliated bronchial cells.   2. LUNG, LEFT, ENDOBRONCHIAL BRUSH   POSITIVE FOR MALIGNANT CELLS.   Non small cell carcinoma favor adenocarcinoma   ---------------------------------------------------------------------------------------------------------------  Surgical Pathology Report (07.20.22 @ 11:53)   Surgical Pathology Report:   1. Left mainstem bronchus biopsy   Final Diagnosis   Bronchus, mainstem, left, biopsy:   - Poorly differentiated adenocarcinoma of lung origin, see comment.   Comment: Immunohistochemical stains: The neoplastic cells are positive   for CAM 5.2 and TTF-1 while negative for P40, CK20, chromogranin,   synaptophysin and CD56. Ki67 reveals a proliferative index of 40%.   In summary the morphology and immunophenotype are consistent with poorly   differentiated adenocarcinoma of lung origin.   Dr. Hopkins was notified of the diagnosis on 07/26/2022.   ---------------------------------------------------------------------------------------------------------------  MICROBIOLOGY:   COVID-19 PCR . (07.18.22 @ 23:25)   [place] [facility]    Culture - Bronchial (07.20.22 @ 16:52)   Culture Results:   Normal Respiratory Tamy present     Culture - Acid Fast - Bronchial w/Smear (07.20.22 @ 16:52)   Specimen Source: .Bronchial Bronchial Lavage   Acid Fast Bacilli Smear:   No acid fast bacilli seen by fluorochrome stain     RADIOLOGY:  [x] Chest radiographs reviewed and interpreted by me     EXAM:  CT BRAIN                          PROCEDURE DATE:  07/28/2022      INTERPRETATION:  CT head without IV contrast    IMPRESSION:   RIGHT frontotemporal craniotomy with surgical cavity and   RIGHT temporal lobe. Mild pneumocephalus. Moderate residual edema is   noted but improved. There is improved effacement of RIGHT lateral   ventricle with 8 mm subfalcine herniation to the LEFT, improved from   prior.    ADRYAN BROWN MD; Attending Radiologist  This document has been electronically signed. Jul 28 2022 10:13AM  ---------------------------------------------------------------------------------------------------------------  EXAM:  CT ABDOMEN AND PELVIS IC                        EXAM:  CT CHEST IC                          PROCEDURE DATE:  07/18/2022      FINDINGS:  CHEST:  LUNGS AND LARGE AIRWAYS: There is a heterogeneous left upper lobe mass   approximately measuring 3.3 x 2.6 cm (2, 28) obstructing the left apical   posterior bronchus with partial atelectasis of the left upper lobe. Few   scattered bilateral pulmonary nodules measuring up to 3 mm in the right   upper lobe (2, 39).  PLEURA: No pleural effusion.  VESSELS: Atherosclerotic changes of the aorta and coronary arteries.  HEART: Heart size is normal. No pericardial effusion.  MEDIASTINUM AND SIXTO: Soft tissue contiguous with mass versus adenopathy   measuring 1.7 x 1.5 cm (2, 33).  CHEST WALL AND LOWER NECK: Bilateral subcentimeter hypoattenuating   thyroid nodules.    ABDOMEN AND PELVIS:  LIVER: Within normal limits.  BILE DUCTS: Normal caliber.  GALLBLADDER: Within normal limits.  SPLEEN: Within normal limits.  PANCREAS: Within normal limits.  ADRENALS: Indeterminant left adrenal nodule measuring 1.2 cm.  KIDNEYS/URETERS: No hydronephrosis. Renal cysts and subcentimeter   hypoattenuating foci bilaterally, too small to characterize.    BLADDER: Within normal limits.  REPRODUCTIVE ORGANS: Uterus and adnexa within normal limits.    BOWEL: Colonic diverticula withoutevidence of acute diverticulitis. No   bowel obstruction. Appendix is normal.  PERITONEUM: No ascites.  VESSELS: Atherosclerotic changes.  RETROPERITONEUM/LYMPH NODES: No lymphadenopathy.  ABDOMINAL WALL: Within normal limits.  BONES: Degenerative changes. Grade 1 anterolisthesis of L5 on S1. Status   post right shoulder arthroplasty.    IMPRESSION:  Left upper lobe lung mass with partial atelectasis of the left upper   lobe, concerning for primary lung neoplasm. Few scattered sub-4 mm   pulmonary nodules, indeterminate.    Indeterminant left adrenal nodule for which contrast enhanced MRI is   recommended for further evaluation.    DEEPAK HARRINGTON MD; Resident Radiologist  This document has been electronically signed.  HAYLEY DENTON MD; Attending Radiologist  This document has been electronically signed. Jul 19 2022  9:02AM  ---------------------------------------------------------------------------------------------------------------  EXAM:  CT BRAIN                          PROCEDURE DATE:  07/18/2022      FINDINGS:  A cystic versus centrally necrotic mass in the right temporal lobe   measures approximately 4.8 x 3.2 x 3.2 cm (602:15 and 601:33).    There moderate tolarge amount of vasogenic edema in the right frontal,   parietal and temporal lobes.  There is regional mass effect in the right   cerebral hemisphere with partial effacement of the right lateral   ventricle and third ventricle.  There is medial bulging of the right   thalamus across the midline by up to 1.4 cm (2:17).  There is   approximately 5 mm subfalcine subfalcine herniation of the right frontal   lobe underneath the cerebral falx (2:21).    There is an approximately 1.4 x 1.3 x 1.6 cm cystic versus centimeter   necrotic mass in the medial left frontal lobe.  There appears to be trace   edema surrounding this lesion, without significant mass effect.    There is no evidence of acute intracranial hemorrhage, transtentorial   herniation or acute territorial infarct.  Mild prominence the left   lateral ventricle may be related to pre-existing cerebral volume loss   rather than hydrocephalus.    The paranasal sinuses and mastoids are grossly clear.    The patient is status post cataractlens placement surgery bilaterally.    The calvarium and skull base appear within normal limits.    IMPRESSION:  Cystic versus a necrotic masses measure approximately 4.8 x 3.2 x 3.2 cm   in the right temporal lobe and 1.4 x 1.3 x 1.6 cm in the medial left   frontal lobe.    Moderate to large amount of vasogenic edema in the right frontal,   parietal and temporal lobes.  Trace edema in the left frontal lobe.    Regional mass effect in the right cerebral hemisphere with partial   effacement of theventricular system and 5 mm subfalcine herniation of   the right frontal lobe underneath the cerebral falx.    The findings may represent intracranial metastatic disease versus   glioblastoma.  Contrast-enhanced brain MRI is recommended for further   evaluation.      Findings discussed with DAVID Albarran by Dr. Harrington at 2153 hours on   7/8/2022 with readback confirmation.    DEEPAK HARRINGTON MD; Resident Radiologist  This document has been electronically signed.  PHILLY CISSE MD; Attending Radiologist  This document has been electronically signed. Jul 18 2022 10:15PM  ---------------------------------------------------------------------------------------------------------------  EXAM:  MR BRAIN WAW IC                          PROCEDURE DATE:  07/20/2022      FINDINGS:    Right temporal lobe peripherally enhancing, centrally necrotic 3.1 x 2.6   cm mass demonstrating mild diffusion restriction. There is surrounding   vasogenic edema. 8 mm leftward midline shift. Right uncal herniation.   Additional left parafalcine 1.3 x 0.9 cm rim-enhancing cystic lesion with   mild surrounding vasogenic edema. Tiny 0.3 x 0.3 cm rim-enhancing lesion   within the left frontal cortex (10:125).    Scattered foci of T2/FLAIR hyperintensity in the bilateral hemispheric   white matter are nonspecific but likely related to chronic white matter   microvascular ischemic disease. The left lateral ventricle is mildly   dilated. Flow voids of the major intracranial vessels at the skull base   follow expected course and contour.    The paranasal sinuses demonstrate no signal abnormality. The mastoids   demonstrate no signal abnormality.  Status post bilateral intraocular   lens implants.      IMPRESSION:  Right temporal lobe peripherally enhancing, centrally necrotic 3.1 x 2.6   cm mass demonstrating mild diffusion restriction. There is surrounding   vasogenic edema. 8 mm leftward midline shift. Right uncal herniation.   Additional left parafalcine 1.3 x 0.9 cm rim-enhancing cystic lesion with   mild surrounding vasogenic edema. Tiny 0.3 x 0.3 cm rim-enhancing lesion   within the left frontal cortex. Findings are concerning for metastatic   disease in the context of patient's known primary lung cancer. The left   lateral ventricle is mildly dilated.    NEIL DIOP MD; Resident Radiologist  This document has been electronically signed.  NERI BARRERA MD; Attending Radiologist  This document has been electronically signed. Jul 21 2022  4:26PM  ---------------------------------------------------------------------------------------------------------------  EXAM:  DUPLEX SCAN EXT VEINS LOWER BI                          PROCEDURE DATE:  07/28/2022      IMPRESSION:  No evidence of deep venous thrombosis in either lower extremity.    YENIFER QUAN MD; Attending Radiologist  This document has been electronically signed. Jul 28 2022  2:08PM  ---------------------------------------------------------------------------------------------------------------

## 2022-07-29 NOTE — PROGRESS NOTE ADULT - ASSESSMENT
81 y/o female (former smoker) with PMHx of HTN, CAD, HLD, MI, peripheral neuropathy, DM and peripheral artery disease presented to the ED for imaging for her head due to abnormal spine MRI by her orthopedist (Dr. Castro) during workup for her spine for her chronic back problems. The orthopedist noted something in her brain. Otherwise pt has no symptoms, stated that since 04/2022 she has had gait/balance issues. Endorsed that if she sits for a while and gets up, she feels like she is "drunk" and feels off. Denied chest pain, SOB, fevers, chills, numbness/paresthesias, muscular weakness, dizziness, abdominal pain, headaches. (19 Jul 2022 10:06)  POD 2 s/p  R craniotomy for tumor resection, likely metastatic lung cancer     NEURO:  CT Head in AM with expected post-op changes    MRI post-op pending   decadron decreased to 4 mg q 8 hr  for vasogenic  cerebral edema  Valproic acid 5000 mg q 12 hr   Q4 hr neuro checks   HOB 30 degrees   Activity: [x] OOB as tolerated [] Bedrest [x] PT [x] OT [] PMNR    PULM:  RA   NSCL cancer     CV:  -160mmHg, d/c a-line   off nicardipine    HTN on losartan continue, and  amlodipine   CAD on metoprolol aspirin on hold due to recent intra-cranial surgery, to be cleared by NS     RENAL:  IVL     GI:  Diet: Diabetic diet   GI prophylaxis [] not indicated [x] PPI [] other: while on decadron   Bowel regimen [x] miralax [x] senna [] other:    ENDO:   DM   on  lantus 28 units to be increased will discuss with endo   pre-meals 16 units before breakfast , 12 u before lunch  / 16 units before night    Goal euglycemia (-180)    HEME/ONC:  VTE prophylaxis: [] SCDs [x] chemoprophylaxis  lovenox 40 mg sc qhs at high risk for DVT and PE   [x] hold chemoprophylaxis due to: fresh post op   [x] high risk of DVT/PE on admission due to: tumor     ID:  [email or web address]     Will discuss with Dr Loomis  Spectra link [phone]       81 y/o female (former smoker) with PMHx of HTN, CAD, HLD, MI, peripheral neuropathy, DM and peripheral artery disease presented to the ED for imaging for her head due to abnormal spine MRI by her orthopedist (Dr. Castro) during workup for her spine for her chronic back problems. The orthopedist noted something in her brain. Otherwise pt has no symptoms, stated that since 04/2022 she has had gait/balance issues. Endorsed that if she sits for a while and gets up, she feels like she is "drunk" and feels off. Denied chest pain, SOB, fevers, chills, numbness/paresthesias, muscular weakness, dizziness, abdominal pain, headaches. (19 Jul 2022 10:06)  POD 2 s/p  R craniotomy for tumor resection, likely metastatic lung cancer     NEURO:  Q4 hr neuro checks   decadron decreased to 4 mg q 8 hr  for vasogenic  cerebral edema  Valproic acid 5000 mg q 12 hr for seizure prophylaxis  analgesic as needed  MRI post-op official report noted  PT/OT/PM&R: acute rehab  Heme oncology following  Will call RT      PULM:  RA   keep O2 sat > 94  NSCL cancer   incentive spirometer    CV  Hemodynamically stable  HTN: c/w  losartan and  amlodipine   CAD on metoprolol aspirin on hold due to recent intra-cranial surgery, to be cleared by NS   C/w lipitor    RENAL:  IVL   Voiding    GI:  Diet: Diabetic diet   GI prophylaxis [] not indicated [x] PPI [] other: while on decadron   Bowel regimen [x] miralax [x] senna [] other:    ENDO:   DM   on  lantus 28 units to be increased will discuss with endo   pre-meals 16 units before breakfast , 12 u before lunch  / 16 units before night    Goal euglycemia (-180)    HEME/ONC:  VTE prophylaxis: [] SCDs [x] chemoprophylaxis  lovenox 40 mg sc qhs at high risk for DVT and PE   [x] hold chemoprophylaxis due to: fresh post op   [x] high risk of DVT/PE on admission due to: tumor     ID:  [email or web address]     Will discuss with Dr Loomis  Spectra link [phone]       81 y/o female (former smoker) with PMHx of HTN, CAD, HLD, MI, peripheral neuropathy, DM and peripheral artery disease presented to the ED for imaging for her head due to abnormal spine MRI by her orthopedist (Dr. Castro) during workup for her spine for her chronic back problems. The orthopedist noted something in her brain. Otherwise pt has no symptoms, stated that since 04/2022 she has had gait/balance issues. Endorsed that if she sits for a while and gets up, she feels like she is "drunk" and feels off. Denied chest pain, SOB, fevers, chills, numbness/paresthesias, muscular weakness, dizziness, abdominal pain, headaches. (19 Jul 2022 10:06)  POD 2 s/p  R craniotomy for tumor resection, likely metastatic lung cancer     NEURO:  Q4 hr neuro checks   decadron decreased to 4 mg q 8 hr  for vasogenic  cerebral edema  Valproic acid 5000 mg q 12 hr for seizure prophylaxis  analgesic as needed  MRI post-op official report noted  PT/OT/PM&R: acute rehab  Heme oncology following  RT follow up requested      PULM:  RA   keep O2 sat > 94  NSCL cancer   incentive spirometer    CV  Hemodynamically stable  HTN: c/w  losartan and  amlodipine   CAD on metoprolol aspirin on hold due to recent intra-cranial surgery, to be cleared by NS   C/w lipitor    RENAL:  IVL   Voiding    GI:  Diet: Diabetic diet   GI prophylaxis [] not indicated [x] PPI [] other: while on decadron   Bowel regimen [x] miralax [x] senna [] other:    ENDO:   DM   on  lantus 28 units to be increased will discuss with endo   pre-meals 16 units before breakfast , 12 u before lunch  / 16 units before night    Goal euglycemia (-180)    HEME/ONC:  VTE prophylaxis: [] SCDs [x] chemoprophylaxis  lovenox 40 mg sc qhs at high risk for DVT and PE   [x] hold chemoprophylaxis due to: fresh post op   [x] high risk of DVT/PE on admission due to: tumor     ID:  [email or web address]     Will discuss with Dr Loomis  Spectra link [phone]       83 y/o female (former smoker) with PMHx of HTN, CAD, HLD, MI, peripheral neuropathy, DM and peripheral artery disease presented to the ED for imaging for her head due to abnormal spine MRI by her orthopedist (Dr. Castro) during workup for her spine for her chronic back problems. The orthopedist noted something in her brain. Otherwise pt has no symptoms, stated that since 04/2022 she has had gait/balance issues. Endorsed that if she sits for a while and gets up, she feels like she is "drunk" and feels off. Denied chest pain, SOB, fevers, chills, numbness/paresthesias, muscular weakness, dizziness, abdominal pain, headaches. (19 Jul 2022 10:06)  POD 2 s/p  R craniotomy for tumor resection, likely metastatic lung cancer     NEURO:  Q4 hr neuro checks   decadron decreased to 4 mg q 8 hr  for vasogenic  cerebral edema  Valproic acid 5000 mg q 12 hr for seizure prophylaxis  analgesic as needed  MRI post-op official report noted  PT/OT/PM&R: acute rehab  Heme oncology following  RT follow up requested      PULM:  RA   keep O2 sat > 94  NSCL cancer   incentive spirometer    CV  Hemodynamically stable  HTN: c/w  losartan and  amlodipine   CAD on metoprolol aspirin on hold due to recent intra-cranial surgery, to be cleared by NS   C/w lipitor    RENAL:  IVL   Voiding    GI:  Diet: Diabetic diet   GI prophylaxis [] not indicated [x] PPI [] other: while on decadron   Bowel regimen [x] miralax [x] senna [] other:    ENDO:   DM: as per endo   Will increase Lantus to 20u at bedtime  Will continue Admelog 12/8/12 units before meals as well as coverage scale. Will continue monitoring FS and FU, will adjust dose as steroids are tapered/dc.  If blood sugars are stable, suggest DC on current insulin regimen. FU Endo 4 weeks.  Discussed plan with patient, counseled for compliance with consistent low carb diet and exercise as tolerated outpatient.    HEME/ONC:  VTE prophylaxis: [] SCDs [x] chemoprophylaxis  lovenox 40 mg sc qhs at high risk for DVT and PE   [x] hold chemoprophylaxis due to: fresh post op   [x] high risk of DVT/PE on admission due to: tumor     ID:  [email or web address]     Will discuss with Dr Loomis  Spectra link [phone]

## 2022-07-29 NOTE — PROGRESS NOTE ADULT - ASSESSMENT
ASSESSMENT:    82 year old gentlewoman, former smoker, without history of intrinsic lung disease. She has a history of HTN, HLD, DM, CAD s/p PCI (1990s and 2021) and PAD s/p RLE stenting. The patient has been having difficulty ambulating over the last several months due to a sense of imbalance with impaired gait. Her family has noted a change in her personality with pressured speech and insomnia. Outpatient cervical MRI incidentally note a CNS lesion. Lumbar spine MRI performed due to chronic back pain revealed multilevel foraminal stenosis/disc bulges. 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 The patient has no shortness of breath or hypoxemia on room air. She has no cough, sputum production, chest congestion or wheeze. No fevers, chills or sweats. No chest pain/pressure or palpitations.     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    MRI -> right temporal lobe peripherally enhancing, centrally necrotic 3.1 x 2.6 cm mass demonstrating mild diffusion restriction - there is surrounding vasogenic edema - 8 mm leftward midline shift right uncal herniation - additional left parafalcine 1.3 x 0.9 cm rim-enhancing cystic lesion with mild surrounding vasogenic edema - tiny 0.3 x 0.3 cm rim-enhancing lesion within the left frontal cortex - findings are concerning for metastatic disease - the left lateral ventricle is mildly dilated    BP control -> losartan/metoprolol/amlodipine    no anticoagulation, ASA or plavix    prophylactic anticonvulsants -> depakote    decadron 4mg q8h po for cerebral edema    analgesics    incentive spirometry     advance diet as tolerated    GI/DVT prophylaxis - protonix/SQ lovenox  endocrinology evaluation noted    insulin regimen being adjusted for steroid induced hyperglycemia  radiation oncology, neurooncology, medical oncology evaluations noted  cardiology     ASA and plavix are currently on hold in anticipation of neurosurgery - patient had PCI last year    continue lipitor/lopressor/losartan/metoprolol    ECHO - normal LVEF - no significant valvular heart disease  bowel regimen    Will follow with you. Plan of care discussed with the patient and her daughter-in-law by phone and with Crissy Garza and Ebonie. There is no pulmonary contraindication to the proposed neurosurgical procedure    Satish Hopkins MD, [facility]  [phone]  Pulmonary Medicine

## 2022-07-29 NOTE — CONSULT NOTE ADULT - SUBJECTIVE AND OBJECTIVE BOX
HPI:   83 y/o female (former smoker) with PMHx of HTN, CAD, HLD, MI, peripheral neuropathy, DM and peripheral artery disease presented to the ED for imaging for her head due to abnormal spine MRI by her orthopedist (Dr. Castro) during workup for her spine for her chronic back problems. The orthopedist noted something in her brain. Otherwise pt has no symptoms, stated that since 04/2022 she has had gait/balance issues. Endorsed that if she sits for a while and gets up, she feels like she is "drunk" and feels off. Denied chest pain, SOB, fevers, chills, numbness/paresthesias, muscular weakness, dizziness, abdominal pain, headaches. (19 Jul 2022 10:06)    Patient was admitted on 7/19, CT head with right temporal mass, left frontal mass with vasogenic edema in multiple lobes, mid line shift, s/p  bronchoscopy ANTHONY mass on 7/20, right craniotomy for tumor on 7/27, patient seen today, states her "head" hurts, denies weakness.     REVIEW OF SYSTEMS  Constitutional - No fever, No weight loss, No fatigue  HEENT - No eye pain, No visual disturbances, No difficulty hearing, No tinnitus, No vertigo, No neck pain  Respiratory - No cough, No wheezing, No shortness of breath  Cardiovascular - No chest pain, No palpitations  Gastrointestinal - No abdominal pain, No nausea, No vomiting, No diarrhea, No constipation  Genitourinary - No dysuria, No frequency, No hematuria, No incontinence  Psychiatric - No depression, No anxiety    VITALS  T(C): 36.8 (07-29-22 @ 09:28), Max: 37.3 (07-29-22 @ 00:57)  HR: 89 (07-29-22 @ 09:28) (53 - 89)  BP: 123/79 (07-29-22 @ 09:28) (119/62 - 164/75)  RR: 18 (07-29-22 @ 09:28) (12 - 24)  SpO2: 98% (07-29-22 @ 09:28) (93% - 100%)  Wt(kg): --    PAST MEDICAL & SURGICAL HISTORY  Hypertension    Coronary artery disease    Hyperlipidemia    Peripheral artery disease    Myocardial infarct    Peripheral neuropathy    Diabetes        SOCIAL HISTORY  Smoking - Denied  EtOH - Denied   Drugs - Denied    FUNCTIONAL HISTORY  Lives alone, 3 HECTOR  Independent AMB and ADLs PTA     CURRENT FUNCTIONAL STATUS  7/28 PT  bed mobility min assist  transfers min assist with RW  gait mod assist bed to chair with RW  ataxic, follows commands    7/28 OT  bed mobility min assist  transfers min assist with RW    FAMILY HISTORY   No pertinent family history in first degree relatives    Family history of stroke (Sibling)        RECENT LABS/IMAGING  CBC Full  -  ( 28 Jul 2022 20:44 )  WBC Count : 23.88 K/uL  RBC Count : 3.91 M/uL  Hemoglobin : 12.2 g/dL  Hematocrit : 36.3 %  Platelet Count - Automated : 254 K/uL  Mean Cell Volume : 92.8 fl  Mean Cell Hemoglobin : 31.2 pg  Mean Cell Hemoglobin Concentration : 33.6 gm/dL  Auto Neutrophil # : x  Auto Lymphocyte # : x  Auto Monocyte # : x  Auto Eosinophil # : x  Auto Basophil # : x  Auto Neutrophil % : x  Auto Lymphocyte % : x  Auto Monocyte % : x  Auto Eosinophil % : x  Auto Basophil % : x    07-28    140  |  104  |  31<H>  ----------------------------<  142<H>  4.5   |  25  |  1.15    Ca    9.1      28 Jul 2022 20:44  Phos  4.2     07-28  Mg     1.9     07-28    TPro  6.0  /  Alb  3.7  /  TBili  0.2  /  DBili  x   /  AST  14  /  ALT  21  /  AlkPhos  76  07-27    < from: CT Head No Cont (07.28.22 @ 09:06) >    IMPRESSION:   RIGHT frontotemporal craniotomy with surgical cavity and   RIGHT temporal lobe. Mild pneumocephalus. Moderate residual edema is   noted but improved. There is improved effacement of RIGHT lateral   ventricle with 8 mm subfalcine herniation to the LEFT, improved from   prior.    < end of copied text >    < from: CT Chest w/ IV Cont (07.18.22 @ 22:52) >    IMPRESSION:  Left upper lobe lung mass with partial atelectasis of the left upper   lobe, concerning for primary lung neoplasm. Few scattered sub-4 mm   pulmonary nodules, indeterminate.    Indeterminant left adrenal nodule for which contrast enhanced MRI is   recommended for further evaluation.    < end of copied text >    ALLERGIES  adhesives (Pruritus)  No Known Drug Allergies      MEDICATIONS   acetaminophen     Tablet .. 650 milliGRAM(s) Oral every 6 hours PRN  amLODIPine   Tablet 5 milliGRAM(s) Oral daily  atorvastatin 40 milliGRAM(s) Oral at bedtime  bisacodyl 5 milliGRAM(s) Oral daily PRN  dexAMETHasone     Tablet 4 milliGRAM(s) Oral every 8 hours  dextrose 50% Injectable 12.5 Gram(s) IV Push once  dextrose 50% Injectable 25 Gram(s) IV Push once  dextrose 50% Injectable 25 Gram(s) IV Push once  diVALproex  milliGRAM(s) Oral every 8 hours  enoxaparin Injectable 40 milliGRAM(s) SubCutaneous <User Schedule>  gabapentin 200 milliGRAM(s) Oral at bedtime  insulin glargine Injectable (LANTUS) 20 Unit(s) SubCutaneous at bedtime  insulin lispro (ADMELOG) corrective regimen sliding scale   SubCutaneous Before meals and at bedtime  insulin lispro Injectable (ADMELOG) 8 Unit(s) SubCutaneous before lunch  insulin lispro Injectable (ADMELOG) 12 Unit(s) SubCutaneous before dinner  insulin lispro Injectable (ADMELOG) 12 Unit(s) SubCutaneous before breakfast  losartan 100 milliGRAM(s) Oral daily  metoprolol tartrate 25 milliGRAM(s) Oral two times a day  ondansetron Injectable 4 milliGRAM(s) IV Push every 6 hours PRN  oxyCODONE    IR 5 milliGRAM(s) Oral every 4 hours PRN  oxyCODONE    IR 10 milliGRAM(s) Oral every 4 hours PRN  pantoprazole    Tablet 40 milliGRAM(s) Oral before breakfast  polyethylene glycol 3350 17 Gram(s) Oral two times a day  senna 2 Tablet(s) Oral at bedtime      ----------------------------------------------------------------------------------------  PHYSICAL EXAM  Constitutional - NAD, Comfortable, laying in bed   Chest - Breathing comfortably  Cardiovascular - S1S2   Extremities - No C/C/E, No calf tenderness   Neurologic Exam -    follows commands                 Cognitive - Awake, Alert, AAO to self, place, date, year, situation     Communication - Fluent, No dysarthria     Cranial Nerves - CN 2-12 intact     Motor - No focal deficits                    LEFT    UE - ShAB 5/5, EF 5/5, EE 5/5, WE 5/5,  5/5                    RIGHT UE - ShAB 5/5, EF 5/5, EE 5/5, WE 5/5,  5/5                    LEFT    LE - HF 5/5, KE 5/5, DF 5/5, PF 5/5                    RIGHT LE - HF 5/5, KE 5/5, DF 5/5, PF 5/5        Sensory - Intact to LT     Psychiatric - Mood stable, Affect WNL  ----------------------------------------------------------------------------------------  ASSESSMENT/PLAN  82yFemale h/o HTN, CAD, HLD, MI, peripheral neuropathy, DM, smoker, PAD with functional deficits after craniotomy for brain mass, tumor resection  decadron taper, valproic acid   NSCL, mets to brain, seen by Fariba, Rad onc, outpatient follow up   Pain - Tylenol, gabapentin, oxycodone prn   DVT PPX - SCDs, lovenox   Rehab - Will continue to follow for ongoing rehab needs and recommendations.   continue bedside therapy  out of bed to chair daily    Recommend ACUTE inpatient rehabilitation for the functional deficits consisting of 3 hours of therapy/day & 24 hour RN/daily PMR physician for comorbid medical management. Patient will be able to tolerate 3 hours a day.

## 2022-07-29 NOTE — PROGRESS NOTE ADULT - ASSESSMENT
Stage IV NSCLC with brain metastases. S/p craniotomy and care per NS and primary team. Further out-pt systemic therapy. Will need to order foundation testing to assess optimal therapy. She will also need RT.    Leucocytosis is reactive due to steroids, surgery, etc. Trend the WBC.

## 2022-07-29 NOTE — PROGRESS NOTE ADULT - PROBLEM SELECTOR PLAN 1
Will increase Lantus to 20u at bedtime  Will continue Admelog 12/8/12 units before meals as well as coverage scale. Will continue monitoring FS and FU, will adjust dose as steroids are tapered/dc.  If blood sugars are stable, suggest DC on current insulin regimen. FU Endo 4 weeks.  Discussed plan with patient, counseled for compliance with consistent low carb diet and exercise as tolerated outpatient.

## 2022-07-29 NOTE — PROGRESS NOTE ADULT - SUBJECTIVE AND OBJECTIVE BOX
Chief complaint  Patient is a 82y old  Female who presents with a chief complaint of abnormal imaging (29 Jul 2022 12:22)   Review of systems  Patient in bed, looks comfortable, no hypoglycemic episodes.    Labs and Fingersticks  CAPILLARY BLOOD GLUCOSE      POCT Blood Glucose.: 128 mg/dL (29 Jul 2022 11:41)  POCT Blood Glucose.: 284 mg/dL (29 Jul 2022 07:38)  POCT Blood Glucose.: 119 mg/dL (28 Jul 2022 21:33)  POCT Blood Glucose.: 94 mg/dL (28 Jul 2022 17:27)      Anion Gap, Serum: 11 (07-28 @ 20:44)  Anion Gap, Serum: 17 (07-27 @ 20:55)      Calcium, Total Serum: 9.1 (07-28 @ 20:44)  Calcium, Total Serum: 9.1 (07-27 @ 20:55)  Albumin, Serum: 3.7 (07-27 @ 20:55)    Alanine Aminotransferase (ALT/SGPT): 21 (07-27 @ 20:55)  Alkaline Phosphatase, Serum: 76 (07-27 @ 20:55)  Aspartate Aminotransferase (AST/SGOT): 14 (07-27 @ 20:55)        07-28    140  |  104  |  31<H>  ----------------------------<  142<H>  4.5   |  25  |  1.15    Ca    9.1      28 Jul 2022 20:44  Phos  4.2     07-28  Mg     1.9     07-28    TPro  6.0  /  Alb  3.7  /  TBili  0.2  /  DBili  x   /  AST  14  /  ALT  21  /  AlkPhos  76  07-27                        12.2   23.88 )-----------( 254      ( 28 Jul 2022 20:44 )             36.3     Medications  MEDICATIONS  (STANDING):  amLODIPine   Tablet 5 milliGRAM(s) Oral daily  atorvastatin 40 milliGRAM(s) Oral at bedtime  dexAMETHasone     Tablet 4 milliGRAM(s) Oral every 8 hours  dextrose 50% Injectable 25 Gram(s) IV Push once  dextrose 50% Injectable 12.5 Gram(s) IV Push once  dextrose 50% Injectable 25 Gram(s) IV Push once  diVALproex  milliGRAM(s) Oral every 8 hours  enoxaparin Injectable 40 milliGRAM(s) SubCutaneous <User Schedule>  gabapentin 200 milliGRAM(s) Oral at bedtime  insulin glargine Injectable (LANTUS) 20 Unit(s) SubCutaneous at bedtime  insulin lispro (ADMELOG) corrective regimen sliding scale   SubCutaneous Before meals and at bedtime  insulin lispro Injectable (ADMELOG) 8 Unit(s) SubCutaneous before lunch  insulin lispro Injectable (ADMELOG) 12 Unit(s) SubCutaneous before dinner  insulin lispro Injectable (ADMELOG) 12 Unit(s) SubCutaneous before breakfast  losartan 100 milliGRAM(s) Oral daily  metoprolol tartrate 25 milliGRAM(s) Oral two times a day  pantoprazole    Tablet 40 milliGRAM(s) Oral before breakfast  polyethylene glycol 3350 17 Gram(s) Oral two times a day  senna 2 Tablet(s) Oral at bedtime      Physical Exam  General: Patient comfortable in bed  Vital Signs Last 12 Hrs  T(F): 98.2 (07-29-22 @ 09:28), Max: 99.2 (07-29-22 @ 00:57)  HR: 89 (07-29-22 @ 09:28) (62 - 89)  BP: 123/79 (07-29-22 @ 09:28) (123/79 - 164/75)  BP(mean): --  RR: 18 (07-29-22 @ 09:28) (17 - 18)  SpO2: 98% (07-29-22 @ 09:28) (95% - 98%)    Diagnostics    Metanephrine, Plasma: AM Sched. Collection: 21-Jul-2022 06:00 (07-20 @ 10:54)  Aldosterone, Serum: AM Sched. Collection: 21-Jul-2022 06:00 (07-20 @ 10:54)  Cortisol AM, Serum: AM Sched. Collection: 21-Jul-2022 06:00 (07-20 @ 10:54)

## 2022-07-30 ENCOUNTER — TRANSCRIPTION ENCOUNTER (OUTPATIENT)
Age: 82
End: 2022-07-30

## 2022-07-30 LAB
ANION GAP SERPL CALC-SCNC: 11 MMOL/L — SIGNIFICANT CHANGE UP (ref 5–17)
BUN SERPL-MCNC: 34 MG/DL — HIGH (ref 7–23)
CALCIUM SERPL-MCNC: 9.9 MG/DL — SIGNIFICANT CHANGE UP (ref 8.4–10.5)
CHLORIDE SERPL-SCNC: 102 MMOL/L — SIGNIFICANT CHANGE UP (ref 96–108)
CO2 SERPL-SCNC: 26 MMOL/L — SIGNIFICANT CHANGE UP (ref 22–31)
CREAT SERPL-MCNC: 0.85 MG/DL — SIGNIFICANT CHANGE UP (ref 0.5–1.3)
EGFR: 68 ML/MIN/1.73M2 — SIGNIFICANT CHANGE UP
GLUCOSE BLDC GLUCOMTR-MCNC: 147 MG/DL — HIGH (ref 70–99)
GLUCOSE BLDC GLUCOMTR-MCNC: 153 MG/DL — HIGH (ref 70–99)
GLUCOSE BLDC GLUCOMTR-MCNC: 238 MG/DL — HIGH (ref 70–99)
GLUCOSE BLDC GLUCOMTR-MCNC: 260 MG/DL — HIGH (ref 70–99)
GLUCOSE SERPL-MCNC: 227 MG/DL — HIGH (ref 70–99)
HCT VFR BLD CALC: 37.1 % — SIGNIFICANT CHANGE UP (ref 34.5–45)
HGB BLD-MCNC: 12.4 G/DL — SIGNIFICANT CHANGE UP (ref 11.5–15.5)
MCHC RBC-ENTMCNC: 31.1 PG — SIGNIFICANT CHANGE UP (ref 27–34)
MCHC RBC-ENTMCNC: 33.4 GM/DL — SIGNIFICANT CHANGE UP (ref 32–36)
MCV RBC AUTO: 93 FL — SIGNIFICANT CHANGE UP (ref 80–100)
NRBC # BLD: 0 /100 WBCS — SIGNIFICANT CHANGE UP (ref 0–0)
PLATELET # BLD AUTO: 235 K/UL — SIGNIFICANT CHANGE UP (ref 150–400)
POTASSIUM SERPL-MCNC: 4.6 MMOL/L — SIGNIFICANT CHANGE UP (ref 3.5–5.3)
POTASSIUM SERPL-SCNC: 4.6 MMOL/L — SIGNIFICANT CHANGE UP (ref 3.5–5.3)
RBC # BLD: 3.99 M/UL — SIGNIFICANT CHANGE UP (ref 3.8–5.2)
RBC # FLD: 12.3 % — SIGNIFICANT CHANGE UP (ref 10.3–14.5)
SODIUM SERPL-SCNC: 139 MMOL/L — SIGNIFICANT CHANGE UP (ref 135–145)
WBC # BLD: 21.01 K/UL — HIGH (ref 3.8–10.5)
WBC # FLD AUTO: 21.01 K/UL — HIGH (ref 3.8–10.5)

## 2022-07-30 RX ORDER — INSULIN LISPRO 100/ML
6 VIAL (ML) SUBCUTANEOUS
Refills: 0 | Status: DISCONTINUED | OUTPATIENT
Start: 2022-07-30 | End: 2022-07-31

## 2022-07-30 RX ORDER — INSULIN GLARGINE 100 [IU]/ML
24 INJECTION, SOLUTION SUBCUTANEOUS AT BEDTIME
Refills: 0 | Status: DISCONTINUED | OUTPATIENT
Start: 2022-07-30 | End: 2022-07-31

## 2022-07-30 RX ADMIN — Medication 2: at 17:02

## 2022-07-30 RX ADMIN — AMLODIPINE BESYLATE 5 MILLIGRAM(S): 2.5 TABLET ORAL at 05:04

## 2022-07-30 RX ADMIN — Medication 4 MILLIGRAM(S): at 21:22

## 2022-07-30 RX ADMIN — INSULIN GLARGINE 24 UNIT(S): 100 INJECTION, SOLUTION SUBCUTANEOUS at 22:01

## 2022-07-30 RX ADMIN — OXYCODONE HYDROCHLORIDE 5 MILLIGRAM(S): 5 TABLET ORAL at 05:03

## 2022-07-30 RX ADMIN — OXYCODONE HYDROCHLORIDE 5 MILLIGRAM(S): 5 TABLET ORAL at 06:24

## 2022-07-30 RX ADMIN — Medication 12 UNIT(S): at 07:53

## 2022-07-30 RX ADMIN — DIVALPROEX SODIUM 500 MILLIGRAM(S): 500 TABLET, DELAYED RELEASE ORAL at 21:22

## 2022-07-30 RX ADMIN — Medication 4 MILLIGRAM(S): at 12:57

## 2022-07-30 RX ADMIN — POLYETHYLENE GLYCOL 3350 17 GRAM(S): 17 POWDER, FOR SOLUTION ORAL at 05:04

## 2022-07-30 RX ADMIN — ATORVASTATIN CALCIUM 40 MILLIGRAM(S): 80 TABLET, FILM COATED ORAL at 21:21

## 2022-07-30 RX ADMIN — LOSARTAN POTASSIUM 100 MILLIGRAM(S): 100 TABLET, FILM COATED ORAL at 05:04

## 2022-07-30 RX ADMIN — POLYETHYLENE GLYCOL 3350 17 GRAM(S): 17 POWDER, FOR SOLUTION ORAL at 17:01

## 2022-07-30 RX ADMIN — DIVALPROEX SODIUM 500 MILLIGRAM(S): 500 TABLET, DELAYED RELEASE ORAL at 05:04

## 2022-07-30 RX ADMIN — SENNA PLUS 2 TABLET(S): 8.6 TABLET ORAL at 21:21

## 2022-07-30 RX ADMIN — Medication 5 MILLIGRAM(S): at 21:21

## 2022-07-30 RX ADMIN — Medication 4: at 12:01

## 2022-07-30 RX ADMIN — Medication 4 MILLIGRAM(S): at 05:04

## 2022-07-30 RX ADMIN — ENOXAPARIN SODIUM 40 MILLIGRAM(S): 100 INJECTION SUBCUTANEOUS at 17:03

## 2022-07-30 RX ADMIN — GABAPENTIN 200 MILLIGRAM(S): 400 CAPSULE ORAL at 21:21

## 2022-07-30 RX ADMIN — DIVALPROEX SODIUM 500 MILLIGRAM(S): 500 TABLET, DELAYED RELEASE ORAL at 12:57

## 2022-07-30 RX ADMIN — Medication 8 UNIT(S): at 12:01

## 2022-07-30 RX ADMIN — Medication 25 MILLIGRAM(S): at 17:03

## 2022-07-30 RX ADMIN — PANTOPRAZOLE SODIUM 40 MILLIGRAM(S): 20 TABLET, DELAYED RELEASE ORAL at 05:04

## 2022-07-30 RX ADMIN — Medication 6: at 07:52

## 2022-07-30 RX ADMIN — Medication 12 UNIT(S): at 17:02

## 2022-07-30 NOTE — DISCHARGE NOTE PROVIDER - NSDCCPCAREPLAN_GEN_ALL_CORE_FT
PRINCIPAL DISCHARGE DIAGNOSIS  Diagnosis: Brain mass  Assessment and Plan of Treatment: S/p Rt crani for brain mass resection on 7/27  Please follow up with neurosurgeon 1-2 weeks after discharge from rehab   Please follow up with heme onc and radiation oncology 1-2 weeks after discharge from rehab for further treatment planning        SECONDARY DISCHARGE DIAGNOSES  Diagnosis: Lung mass  Assessment and Plan of Treatment: S/p bronchoscopy on 7/20   Please follow up with pulmonology 1-2 weeks after dsicharge from rehab    Diagnosis: Multiple thyroid nodules  Assessment and Plan of Treatment: Please follow up with endocrinologist as an outpatient for further follow up.  Please call to make an appointment 1-2 weeks after discharge from rehab     PRINCIPAL DISCHARGE DIAGNOSIS  Diagnosis: Brain mass  Assessment and Plan of Treatment: s/p right craniotomy for resection of brain mass on 7/27/2022   No strenuous activity.  No lifting.  No twisting or bending.  Do not return to work until cleared to do so by physician.  No driving until cleared to do so by physician.  Keep incisions clean and dry. do not get incision wet until post op day #4. do not soak in water. no baths. pat dry. do not apply cream/moisture to incisions.  Do not take NSAIDs (aspirin, motrin, advil, aleve, etc) or blood thinners until cleared to do so by neurosurgeon  items for follow up: wound check  remove staples on 8/10/2022 (post op day #14)  follow up with neurosurgery, Dr. Loomis, within 1 week of discharge from rehab, call [phone]  follow up with hematology, Dr. Alexander, within 1 week of discharge from rehab, call [phone]  follow up with radiation oncology, Dr. Funes, within 1 week of discharge from rehab, call [phone]  follow up with cardiology, Dr. Alcaraz, within 1 week of discharge from rehab, call [phone]  follow up with PMD within 1 week of discharge from rehab      SECONDARY DISCHARGE DIAGNOSES  Diagnosis: Lung mass  Assessment and Plan of Treatment: S/p bronchoscopy on 7/20 /2022  follow up with pulmonary, Dr. Hopkins, within 1 week of discharge, call [phone]    Diagnosis: Multiple thyroid nodules  Assessment and Plan of Treatment: Please follow up with endocrinologist as an outpatient for further follow up and routine screening.  Please call to make an appointment 1-2 weeks after discharge from rehab     PRINCIPAL DISCHARGE DIAGNOSIS  Diagnosis: Brain mass  Assessment and Plan of Treatment: s/p right craniotomy for resection of brain mass on 7/27/2022   No strenuous activity.  No lifting.  No twisting or bending.  Do not return to work until cleared to do so by physician.  No driving until cleared to do so by physician.  Keep incisions clean and dry. do not get incision wet until post op day #4. do not soak in water. no baths. pat dry. do not apply cream/moisture to incisions.  Do not take NSAIDs (aspirin, motrin, advil, aleve, etc) or blood thinners until cleared to do so by neurosurgeon  White blood cell count elevated post op, likely reactive from OR/decadron, has been afebrile, repeat cbc at rehab.  items for follow up: wound check  remove staples on 8/10/2022 (post op day #14)  follow up with neurosurgery, Dr. Loomis, within 1 week of discharge from rehab, call [phone]  follow up with hematology, Dr. Alexander, within 1 week of discharge from rehab, call [phone]  follow up with radiation oncology, Dr. Funes, within 1 week of discharge from rehab, call [phone]  follow up with cardiology, Dr. Alcaraz, within 1 week of discharge from rehab, call [phone]  follow up with PMD within 1 week of discharge from rehab      SECONDARY DISCHARGE DIAGNOSES  Diagnosis: Lung mass  Assessment and Plan of Treatment: S/p bronchoscopy on 7/20 /2022  follow up with pulmonary, Dr. Hopkins, within 1 week of discharge, call [phone]    Diagnosis: Multiple thyroid nodules  Assessment and Plan of Treatment: Please follow up with endocrinologist as an outpatient for further follow up and routine screening.  Please call to make an appointment 1-2 weeks after discharge from rehab     PRINCIPAL DISCHARGE DIAGNOSIS  Diagnosis: Brain mass  Assessment and Plan of Treatment: s/p right craniotomy for resection of brain mass on 7/27/2022   No strenuous activity.  No lifting.  No twisting or bending.  Do not return to work until cleared to do so by physician.  No driving until cleared to do so by physician.  Keep incisions clean and dry. do not get incision wet until post op day #4. do not soak in water. no baths. pat dry. do not apply cream/moisture to incisions.  Do not take NSAIDs (aspirin, motrin, advil, aleve, etc) or blood thinners until cleared to do so by neurosurgeon  White blood cell count elevated post op, likely reactive from OR/decadron, has been afebrile, repeat cbc at rehab.  items for follow up: wound check, when to resume aspirin/plavix  remove staples on 8/10/2022 (post op day #14)  follow up with neurosurgery, Dr. Loomis, within 1 week of discharge from rehab, call [phone]  follow up with hematology, Dr. Alexander, within 1 week of discharge from rehab, call [phone]  follow up with radiation oncology, Dr. Funes, within 1 week of discharge from rehab, call [phone]  follow up with cardiology, Dr. Alcaraz, within 1 week of discharge from rehab, call [phone]  follow up with PMD within 1 week of discharge from rehab      SECONDARY DISCHARGE DIAGNOSES  Diagnosis: Lung mass  Assessment and Plan of Treatment: S/p bronchoscopy on 7/20 /2022  follow up with pulmonary, Dr. Hopkins, within 1 week of discharge, call [phone]    Diagnosis: Multiple thyroid nodules  Assessment and Plan of Treatment: Please follow up with endocrinologist as an outpatient for further follow up and routine screening.  Please call to make an appointment 1-2 weeks after discharge from rehab     PRINCIPAL DISCHARGE DIAGNOSIS  Diagnosis: Brain mass  Assessment and Plan of Treatment: s/p right craniotomy for resection of brain mass on 7/27/2022   Pathology NSC adenocarcinoma - lung origin   No strenuous activity.  No lifting.  No twisting or bending.  Do not return to work until cleared to do so by physician.  No driving until cleared to do so by physician.  Do not take NSAIDs (motrin, advil, aleve, etc)   Tissue injury forehead related to tight head wrap. Bacitracin to healing blisters. Keep area clean   follow up with neurosurgery, Dr. Loomis, within 1 week of discharge from rehab, call [phone]  follow up with hematology, Dr. Alexander, within 1 week of discharge from rehab, call [phone]  follow up with radiation oncology, Dr. Funes, within 1 week of discharge from rehab, call [phone]  follow up with PMD within 1 week of discharge from rehab      SECONDARY DISCHARGE DIAGNOSES  Diagnosis: DM (diabetes mellitus)  Assessment and Plan of Treatment: Continue Lantus & premeal. d/c Glimiperide. Follow up with endocrinology outpatient    Diagnosis: Lung mass  Assessment and Plan of Treatment: S/p bronchoscopy on 7/20 /2022  follow up with pulmonary, Dr. Hopkins, within 1 week of discharge, call [phone]    Diagnosis: Multiple thyroid nodules  Assessment and Plan of Treatment: Please follow up with endocrinologist as an outpatient for further follow up and routine screening.  Please call to make an appointment 1-2 weeks after discharge from rehab    Diagnosis: STEMI (ST elevation myocardial infarction)  Assessment and Plan of Treatment: On Aspirin Eliquis & Metoprolol  follow up with cardiology, Dr. Alcaraz, within 1 week of discharge from rehab, call [phone]      Diagnosis: Paroxysmal atrial fibrillation  Assessment and Plan of Treatment: New onset Afib. Continue Eliquis & Metoprolol for rate control     PRINCIPAL DISCHARGE DIAGNOSIS  Diagnosis: Brain mass  Assessment and Plan of Treatment: s/p right craniotomy for resection of brain mass on 7/27/2022   Pathology NSC adenocarcinoma - lung origin   No strenuous activity.  No lifting.  No twisting or bending.  Do not return to work until cleared to do so by physician.  No driving until cleared to do so by physician.  Do not take NSAIDs (motrin, advil, aleve, etc)   Tissue injury forehead related to tight head wrap. Bacitracin to healing blisters. Keep area clean   follow up with neurosurgery, Dr. Loomis, within 1 week of discharge from rehab, call [phone]  follow up with hematology, Dr. Alexander, within 1 week of discharge from rehab, call [phone]  follow up with radiation oncology, Dr. Funes, within 1 week of discharge from rehab, call [phone]  follow up with PMD within 1 week of discharge from rehab      SECONDARY DISCHARGE DIAGNOSES  Diagnosis: DM (diabetes mellitus)  Assessment and Plan of Treatment: Continue Lantus & premeal. d/c Glimiperide. Follow up with endocrinology outpatient    Diagnosis: Lung mass  Assessment and Plan of Treatment: S/p bronchoscopy on 7/20 /2022  follow up with pulmonary, Dr. Hopkins, within 1 week of discharge, call [phone]    Diagnosis: Multiple thyroid nodules  Assessment and Plan of Treatment: Please follow up with endocrinologist as an outpatient for further follow up and routine screening.  Please call to make an appointment 1-2 weeks after discharge from rehab    Diagnosis: STEMI (ST elevation myocardial infarction)  Assessment and Plan of Treatment: On Aspirin Eliquis & Metoprolol  follow up with cardiology, Dr. Alcaraz, within 1 week of discharge from rehab, call [phone]      Diagnosis: Paroxysmal atrial fibrillation  Assessment and Plan of Treatment: New onset Afib. 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 Alexander, within 1 week of discharge from rehab, call [phone]  follow up with radiation oncology, Dr. Funes, within 1 week of discharge from rehab, call [phone]

## 2022-07-30 NOTE — PROGRESS NOTE ADULT - SUBJECTIVE AND OBJECTIVE BOX
DATE OF SERVICE: 07-30-22 @ 10:15    Patient is a 82y old  Female who presents with a chief complaint of abnormal imaging (29 Jul 2022 16:41)      SUBJECTIVE / OVERNIGHT EVENTS:    MEDICATIONS  (STANDING):  amLODIPine   Tablet 5 milliGRAM(s) Oral daily  atorvastatin 40 milliGRAM(s) Oral at bedtime  dexAMETHasone     Tablet 4 milliGRAM(s) Oral every 8 hours  dextrose 50% Injectable 25 Gram(s) IV Push once  dextrose 50% Injectable 12.5 Gram(s) IV Push once  dextrose 50% Injectable 25 Gram(s) IV Push once  diVALproex  milliGRAM(s) Oral every 8 hours  enoxaparin Injectable 40 milliGRAM(s) SubCutaneous <User Schedule>  gabapentin 200 milliGRAM(s) Oral at bedtime  insulin glargine Injectable (LANTUS) 20 Unit(s) SubCutaneous at bedtime  insulin lispro (ADMELOG) corrective regimen sliding scale   SubCutaneous Before meals and at bedtime  insulin lispro Injectable (ADMELOG) 8 Unit(s) SubCutaneous before lunch  insulin lispro Injectable (ADMELOG) 12 Unit(s) SubCutaneous before dinner  insulin lispro Injectable (ADMELOG) 12 Unit(s) SubCutaneous before breakfast  losartan 100 milliGRAM(s) Oral daily  metoprolol tartrate 25 milliGRAM(s) Oral two times a day  pantoprazole    Tablet 40 milliGRAM(s) Oral before breakfast  polyethylene glycol 3350 17 Gram(s) Oral two times a day  senna 2 Tablet(s) Oral at bedtime    MEDICATIONS  (PRN):  acetaminophen     Tablet .. 650 milliGRAM(s) Oral every 6 hours PRN Temp greater or equal to 38C (100.4F), Mild Pain (1 - 3)  bisacodyl 5 milliGRAM(s) Oral daily PRN Constipation  melatonin 5 milliGRAM(s) Oral at bedtime PRN Sleep  ondansetron Injectable 4 milliGRAM(s) IV Push every 6 hours PRN Nausea and/or Vomiting  oxyCODONE    IR 5 milliGRAM(s) Oral every 4 hours PRN Moderate Pain (4 - 6)  oxyCODONE    IR 10 milliGRAM(s) Oral every 4 hours PRN Severe Pain (7 - 10)      Vital Signs Last 24 Hrs  T(C): 36.5 (30 Jul 2022 09:26), Max: 37.3 (29 Jul 2022 21:40)  T(F): 97.7 (30 Jul 2022 09:26), Max: 99.1 (29 Jul 2022 21:40)  HR: 62 (30 Jul 2022 09:26) (55 - 73)  BP: 123/76 (30 Jul 2022 09:26) (123/76 - 159/78)  BP(mean): --  RR: 18 (30 Jul 2022 09:26) (18 - 18)  SpO2: 97% (30 Jul 2022 09:26) (94% - 98%)    Parameters below as of 30 Jul 2022 04:57  Patient On (Oxygen Delivery Method): room air      CAPILLARY BLOOD GLUCOSE      POCT Blood Glucose.: 260 mg/dL (30 Jul 2022 07:37)  POCT Blood Glucose.: 235 mg/dL (29 Jul 2022 21:19)  POCT Blood Glucose.: 86 mg/dL (29 Jul 2022 16:21)  POCT Blood Glucose.: 128 mg/dL (29 Jul 2022 11:41)    I&O's Summary    29 Jul 2022 07:01  -  30 Jul 2022 07:00  --------------------------------------------------------  IN: 950 mL / OUT: 400 mL / NET: 550 mL        PHYSICAL EXAM:  GENERAL: NAD, well-developed  HEAD:  Atraumatic, Normocephalic  EYES: EOMI, PERRLA, conjunctiva and sclera clear  NECK: Supple, No JVD  CHEST/LUNG: Clear to auscultation bilaterally; No wheeze  HEART: Regular rate and rhythm; No murmurs, rubs, or gallops  ABDOMEN: Soft, Nontender, Nondistended; Bowel sounds present  EXTREMITIES:  2+ Peripheral Pulses, No clubbing, cyanosis, or edema  PSYCH: AAOx3  NEUROLOGY: non-focal  SKIN: No rashes or lesions    LABS:                        12.4   21.01 )-----------( 235      ( 30 Jul 2022 06:07 )             37.1     07-30    139  |  102  |  34<H>  ----------------------------<  227<H>  4.6   |  26  |  0.85    Ca    9.9      30 Jul 2022 06:07  Phos  4.2     07-28  Mg     1.9     07-28                RADIOLOGY & ADDITIONAL TESTS:    Imaging Personally Reviewed:    Consultant(s) Notes Reviewed:      Care Discussed with Consultants/Other Providers:

## 2022-07-30 NOTE — PROGRESS NOTE ADULT - PROBLEM SELECTOR PLAN 1
can be DC Lantus to 20u at bedtime  and  Admelog  8 units before meals as  . FU  by her Endocrinologist dr tommy Richardson 2weeks.  Discussed plan with patient, counseled for compliance with consistent low carb diet and exercise as tolerated outpatient. Increase  Lantus to 24u at bedtime  and  Admelog   12/ 6 /12 respectively pre-meals.  . FU  by her Endocrinologist dr tommy Richardson 2weeks.  Discussed plan with patient, counseled for compliance with consistent low carb diet and exercise as tolerated outpatient.

## 2022-07-30 NOTE — PROGRESS NOTE ADULT - ASSESSMENT
81 y/o female (former smoker) with PMHx of HTN, CAD, HLD, MI, peripheral neuropathy, DM and peripheral artery disease presented to the ED for imaging for her head due to abnormal spine MRI by her orthopedist (Dr. Castro) during workup for her spine for her chronic back problems. The orthopedist noted something in her brain. Otherwise pt has no symptoms, stated that since 04/2022 she has had gait/balance issues. Endorsed that if she sits for a while and gets up, she feels like she is "drunk" and feels off. Denied chest pain, SOB, fevers, chills, numbness/paresthesias, muscular weakness, dizziness, abdominal pain, headaches. (19 Jul 2022 10:06)  POD 3 s/p  R craniotomy for tumor resection, likely metastatic lung cancer     NEURO:  Q4 hr neuro checks   decadron decreased to 4 mg q 8 hr  for vasogenic  cerebral edema  Valproic acid 5000 mg q 12 hr for seizure prophylaxis  analgesic as needed  MRI post-op official report noted  PT/OT/PM&R: acute rehab  Heme oncology following  RT follow up requested      PULM:  RA   keep O2 sat > 94  NSCL cancer   incentive spirometer    CV  Hemodynamically stable  HTN: c/w  losartan and  amlodipine   CAD on metoprolol aspirin on hold due to recent intra-cranial surgery, to be cleared by NS   C/w lipitor    RENAL:  IVL   Voiding    GI:  Diet: Diabetic diet   GI prophylaxis [] not indicated [x] PPI [] other: while on decadron   Bowel regimen [x] miralax [x] senna [] other:    ENDO:   DM: as per endo   Will increase Lantus to 20u at bedtime  Will continue Admelog 12/8/12 units before meals as well as coverage scale. Will continue monitoring FS and FU, will adjust dose as steroids are tapered/dc.  If blood sugars are stable, suggest DC on current insulin regimen. FU Endo 4 weeks.  Discussed plan with patient, counseled for compliance with consistent low carb diet and exercise as tolerated outpatient.    HEME/ONC:  VTE prophylaxis: [] SCDs [x] chemoprophylaxis  lovenox 40 mg sc qhs at high risk for DVT and PE   [x] hold chemoprophylaxis due to: fresh post op   [x] high risk of DVT/PE on admission due to: tumor     ID:  afebrile     Will discuss with Dr Loomis  Spectra link [id number]       83 y/o female (former smoker) with PMHx of HTN, CAD, HLD, MI, peripheral neuropathy, DM and peripheral artery disease presented to the ED for imaging for her head due to abnormal spine MRI by her orthopedist (Dr. Castro) during workup for her spine for her chronic back problems. The orthopedist noted something in her brain. Otherwise pt has no symptoms, stated that since 04/2022 she has had gait/balance issues. Endorsed that if she sits for a while and gets up, she feels like she is "drunk" and feels off. Denied chest pain, SOB, fevers, chills, numbness/paresthesias, muscular weakness, dizziness, abdominal pain, headaches. (19 Jul 2022 10:06)  POD #3 s/p  R craniotomy for tumor resection, likely metastatic lung cancer     NEURO:  Q4 hr neuro checks   decadron decreased to 4 mg q 8 hr  for vasogenic  cerebral edema  Valproic acid 5000 mg q 12 hr for seizure prophylaxis  analgesic as needed  MRI post-op official report noted  PT/OT/PM&R: acute rehab  Heme oncology following  RT follow up requested pt needs to f/u with Dr Rodriguez once d/c from rehab      PULM:  RA   keep O2 sat > 94  NSCL cancer   incentive spirometer    CV  Hemodynamically stable  HTN: c/w  losartan and  amlodipine   CAD on metoprolol aspirin on hold due to recent intra-cranial surgery, to be cleared by NS   C/w lipitor    RENAL:  IVL   Voiding    GI:  Diet: Diabetic diet   GI prophylaxis [] not indicated [x] PPI [] other: while on decadron   Bowel regimen [x] miralax [x] senna [] other:    ENDO:   DM: as per endo   Will increase Lantus to 20u at bedtime  Will continue Admelog 12/8/12 units before meals as well as coverage scale. Will continue monitoring FS and FU, will adjust dose as steroids are tapered/dc.  If blood sugars are stable, suggest DC on current insulin regimen. FU Endo 4 weeks.  Discussed plan with patient, counseled for compliance with consistent low carb diet and exercise as tolerated outpatient.    HEME/ONC:  VTE prophylaxis: [] SCDs [x] chemoprophylaxis    [x] high risk of DVT/PE on admission due to: tumor     ID:  [email or web address]     Will discuss with Dr Loomis  Spectra link [phone]       83 y/o female (former smoker) with PMHx of HTN, CAD, HLD, MI, peripheral neuropathy, DM and peripheral artery disease presented to the ED for imaging for her head due to abnormal spine MRI by her orthopedist (Dr. Castro) during workup for her spine for her chronic back problems. The orthopedist noted something in her brain. Otherwise pt has no symptoms, stated that since 04/2022 she has had gait/balance issues. Endorsed that if she sits for a while and gets up, she feels like she is "drunk" and feels off. Denied chest pain, SOB, fevers, chills, numbness/paresthesias, muscular weakness, dizziness, abdominal pain, headaches. (19 Jul 2022 10:06)  POD #3 s/p  R craniotomy for tumor resection, likely metastatic lung cancer     NEURO:  Q4 hr neuro checks   decadron decreased to 4 mg q 8 hr  for vasogenic  cerebral edema  Valproic acid 5000 mg q 12 hr for seizure prophylaxis  analgesic as needed  MRI post-op official report noted  PT/OT/PM&R: acute rehab  Heme oncology following, will need out patient rx  RT follow up requested pt needs to f/u with Dr Rodriguez once d/c from rehab      PULM:  RA   keep O2 sat > 94  NSCL cancer   incentive spirometer    CV  Hemodynamically stable  HTN: c/w  losartan and  amlodipine   CAD on metoprolol aspirin on hold due to recent intra-cranial surgery, to be cleared by NS   C/w lipitor    RENAL:  IVL   Voiding    GI:  Diet: Diabetic diet   GI prophylaxis [] not indicated [x] PPI [] other: while on decadron   Bowel regimen [x] miralax [x] senna [] other:    ENDO:   DM: as per endo   Will increase Lantus to 20u at bedtime  Will continue Admelog 12/8/12 units before meals as well as coverage scale. Will continue monitoring FS and FU, will adjust dose as steroids are tapered/dc.  If blood sugars are stable, suggest DC on current insulin regimen. FU Endo 4 weeks.  Discussed plan with patient, counseled for compliance with consistent low carb diet and exercise as tolerated outpatient.    HEME/ONC:  VTE prophylaxis: [] SCDs [x] chemoprophylaxis    [x] high risk of DVT/PE on admission due to: tumor     ID:  afebrile     Will discuss with Dr Ebonie Johnston link [phone]       83 y/o female (former smoker) with PMHx of HTN, CAD, HLD, MI, peripheral neuropathy, DM and peripheral artery disease presented to the ED for imaging for her head due to abnormal spine MRI by her orthopedist (Dr. Castro) during workup for her spine for her chronic back problems. The orthopedist noted something in her brain. Otherwise pt has no symptoms, stated that since 04/2022 she has had gait/balance issues. Endorsed that if she sits for a while and gets up, she feels like she is "drunk" and feels off. Denied chest pain, SOB, fevers, chills, numbness/paresthesias, muscular weakness, dizziness, abdominal pain, headaches. (19 Jul 2022 10:06)  POD #3 s/p  R craniotomy for tumor resection, likely metastatic lung cancer     NEURO:  Q4 hr neuro checks   decadron decreased to 4 mg q 8 hr  for vasogenic  cerebral edema  Valproic acid 5000 mg q 12 hr for seizure prophylaxis  analgesic as needed  MRI post-op official report noted  PT/OT/PM&R: acute rehab  Heme oncology following, will need out patient rx  RT follow up requested pt needs to f/u with Dr Rodriguez once d/c from rehab      PULM:  RA   keep O2 sat > 94  NSCL cancer   incentive spirometer    CV  Hemodynamically stable  HTN: c/w  losartan and  amlodipine   CAD on metoprolol aspirin on hold due to recent intra-cranial surgery, to be cleared by NS   C/w lipitor    RENAL:  IVL   Voiding    GI:  Diet: Diabetic diet   GI prophylaxis [] not indicated [x] PPI [] other: while on decadron   Bowel regimen [x] miralax [x] senna [] other:    ENDO:   DM: as per endo   Will increase Lantus to 24u at bedtime  Will continue Admelog 12/6/12 units before meals as well as coverage scale. Will continue monitoring FS and FU, will adjust dose as steroids are tapered/dc.  If blood sugars are stable, suggest DC on current insulin regimen. FU Endo 4 weeks.  Discussed plan with patient, counseled for compliance with consistent low carb diet and exercise as tolerated outpatient.    HEME/ONC:  VTE prophylaxis: [] SCDs [x] chemoprophylaxis    [x] high risk of DVT/PE on admission due to: tumor     ID:  afebrile     Will discuss with Dr Ebonie Johnston link [phone]

## 2022-07-30 NOTE — PROGRESS NOTE ADULT - SUBJECTIVE AND OBJECTIVE BOX
CARDIOLOGY FOLLOW UP NOTE - DR. REDDING    Patient Name: LANIE BERTRAND  Date of Service: 07-30-22 @ 13:44    Patient seen and examined    Subjective:    cv: denies chest pain, dyspnea, palpitations, dizziness  pulmonary: denies cough  GI: denies abdominal pain, nausea, vomiting  vascular/legs: no edema   skin: no rash  ROS: otherwise negative   overnight events:      PHYSICAL EXAM:  T(C): 36.6 (07-30-22 @ 13:33), Max: 37.3 (07-29-22 @ 21:40)  HR: 71 (07-30-22 @ 13:33) (55 - 71)  BP: 118/82 (07-30-22 @ 13:33) (118/82 - 159/78)  RR: 18 (07-30-22 @ 13:33) (18 - 18)  SpO2: 96% (07-30-22 @ 13:33) (94% - 98%)  Wt(kg): --  I&O's Summary    29 Jul 2022 07:01  -  30 Jul 2022 07:00  --------------------------------------------------------  IN: 950 mL / OUT: 400 mL / NET: 550 mL    30 Jul 2022 07:01  -  30 Jul 2022 13:44  --------------------------------------------------------  IN: 360 mL / OUT: 0 mL / NET: 360 mL      Daily     Daily     Appearance: Normal	s/p cranio  Cardiovascular: Normal S1 S2,RRR, No JVD, No murmurs  Respiratory: Lungs clear to auscultation	  Gastrointestinal:  Soft, Non-tender, + BS	  Extremities: Normal range of motion, No clubbing, cyanosis or edema      Home Medications:  aspirin 81 mg oral tablet: 1 tab(s) orally once a day (19 Jul 2022 14:45)  gabapentin 100 mg oral capsule: 2 cap(s) orally once a day (at bedtime) (19 Jul 2022 14:45)  glimepiride 4 mg oral tablet: 1 tab(s) orally 2 times a day (19 Jul 2022 14:45)  losartan-hydrochlorothiazide 100 mg-25 mg oral tablet: 1 tab(s) orally once a day (19 Jul 2022 14:45)  metFORMIN 1000 mg oral tablet: 1 tab(s) orally 2 times a day   (19 Jul 2022 14:45)  metoprolol tartrate 50 mg oral tablet: 1 tab(s) orally 3 times a day (19 Jul 2022 14:45)  simvastatin 40 mg oral tablet: 1 tab(s) orally once a day (at bedtime) (19 Jul 2022 14:45)  Toujeo Max SoloStar 300 units/mL subcutaneous solution: 6 unit(s) subcutaneous 2 times a day as directed (19 Jul 2022 14:45)  Victoza 18 mg/3 mL subcutaneous solution: 0.6 milligram(s) subcutaneous once a day (19 Jul 2022 14:45)      MEDICATIONS  (STANDING):  amLODIPine   Tablet 5 milliGRAM(s) Oral daily  atorvastatin 40 milliGRAM(s) Oral at bedtime  dexAMETHasone     Tablet 4 milliGRAM(s) Oral every 8 hours  dextrose 50% Injectable 25 Gram(s) IV Push once  dextrose 50% Injectable 12.5 Gram(s) IV Push once  dextrose 50% Injectable 25 Gram(s) IV Push once  diVALproex  milliGRAM(s) Oral every 8 hours  enoxaparin Injectable 40 milliGRAM(s) SubCutaneous <User Schedule>  gabapentin 200 milliGRAM(s) Oral at bedtime  insulin glargine Injectable (LANTUS) 24 Unit(s) SubCutaneous at bedtime  insulin lispro (ADMELOG) corrective regimen sliding scale   SubCutaneous Before meals and at bedtime  insulin lispro Injectable (ADMELOG) 6 Unit(s) SubCutaneous before lunch  insulin lispro Injectable (ADMELOG) 12 Unit(s) SubCutaneous before dinner  insulin lispro Injectable (ADMELOG) 12 Unit(s) SubCutaneous before breakfast  losartan 100 milliGRAM(s) Oral daily  metoprolol tartrate 25 milliGRAM(s) Oral two times a day  pantoprazole    Tablet 40 milliGRAM(s) Oral before breakfast  polyethylene glycol 3350 17 Gram(s) Oral two times a day  senna 2 Tablet(s) Oral at bedtime      TELEMETRY: 	    ECG:  	  RADIOLOGY:   DIAGNOSTIC TESTING:  [ ] Echocardiogram:  [ ] Catheterization:  [ ] Stress Test:    OTHER: 	    LABS:	 	    CARDIAC MARKERS:                                      12.4   21.01 )-----------( 235      ( 30 Jul 2022 06:07 )             37.1     07-30    139  |  102  |  34<H>  ----------------------------<  227<H>  4.6   |  26  |  0.85    Ca    9.9      30 Jul 2022 06:07  Phos  4.2     07-28  Mg     1.9     07-28      proBNP:     Lipid Profile:   HgA1c:     Creatinine, Serum: 0.85 mg/dL (07-30-22 @ 06:07)  Creatinine, Serum: 1.15 mg/dL (07-28-22 @ 20:44)  Creatinine, Serum: 0.93 mg/dL (07-27-22 @ 20:55)

## 2022-07-30 NOTE — PROGRESS NOTE ADULT - SUBJECTIVE AND OBJECTIVE BOX
Endocrinology Attending Covering for Dr. Tovar      Chief complaint  Patient is a 82y old  Female who presents with a chief complaint of abnormal imaging (30 Jul 2022 10:59)   Review of systems  Patient in bed, looks comfortable, no fever,  had no hypoglycemia.    Labs and Fingersticks  CAPILLARY BLOOD GLUCOSE      POCT Blood Glucose.: 238 mg/dL (30 Jul 2022 11:29)  POCT Blood Glucose.: 260 mg/dL (30 Jul 2022 07:37)  POCT Blood Glucose.: 235 mg/dL (29 Jul 2022 21:19)  POCT Blood Glucose.: 86 mg/dL (29 Jul 2022 16:21)      Anion Gap, Serum: 11 (07-30 @ 06:07)  Anion Gap, Serum: 11 (07-28 @ 20:44)      Calcium, Total Serum: 9.9 (07-30 @ 06:07)  Calcium, Total Serum: 9.1 (07-28 @ 20:44)          07-30    139  |  102  |  34<H>  ----------------------------<  227<H>  4.6   |  26  |  0.85    Ca    9.9      30 Jul 2022 06:07  Phos  4.2     07-28  Mg     1.9     07-28                          12.4   21.01 )-----------( 235      ( 30 Jul 2022 06:07 )             37.1     Medications  MEDICATIONS  (STANDING):  amLODIPine   Tablet 5 milliGRAM(s) Oral daily  atorvastatin 40 milliGRAM(s) Oral at bedtime  dexAMETHasone     Tablet 4 milliGRAM(s) Oral every 8 hours  dextrose 50% Injectable 25 Gram(s) IV Push once  dextrose 50% Injectable 12.5 Gram(s) IV Push once  dextrose 50% Injectable 25 Gram(s) IV Push once  diVALproex  milliGRAM(s) Oral every 8 hours  enoxaparin Injectable 40 milliGRAM(s) SubCutaneous <User Schedule>  gabapentin 200 milliGRAM(s) Oral at bedtime  insulin glargine Injectable (LANTUS) 20 Unit(s) SubCutaneous at bedtime  insulin lispro (ADMELOG) corrective regimen sliding scale   SubCutaneous Before meals and at bedtime  insulin lispro Injectable (ADMELOG) 8 Unit(s) SubCutaneous before lunch  insulin lispro Injectable (ADMELOG) 12 Unit(s) SubCutaneous before dinner  insulin lispro Injectable (ADMELOG) 12 Unit(s) SubCutaneous before breakfast  losartan 100 milliGRAM(s) Oral daily  metoprolol tartrate 25 milliGRAM(s) Oral two times a day  pantoprazole    Tablet 40 milliGRAM(s) Oral before breakfast  polyethylene glycol 3350 17 Gram(s) Oral two times a day  senna 2 Tablet(s) Oral at bedtime      Physical Exam  General: Patient comfortable in bed  Vital Signs Last 12 Hrs  T(F): 97.7 (07-30-22 @ 09:26), Max: 98.3 (07-30-22 @ 00:41)  HR: 62 (07-30-22 @ 09:26) (55 - 62)  BP: 123/76 (07-30-22 @ 09:26) (123/76 - 159/78)  BP(mean): --  RR: 18 (07-30-22 @ 09:26) (18 - 18)  SpO2: 97% (07-30-22 @ 09:26) (95% - 97%)  Neck: No palpable thyroid nodules.  CVS: S1S2, No murmurs  Respiratory: No wheezing, no crepitations  GI: Abdomen soft, bowel sounds positive  Musculoskeletal:  edema lower extremities.   Skin: No skin rashes, no ecchymosis    Diagnostics

## 2022-07-30 NOTE — DISCHARGE NOTE PROVIDER - NSDCFUADDINST_GEN_ALL_CORE_FT
Go to the ED if you have any: bleeding, nausea, vomiting, diarrhea, constipation, fevers, chills, chest pain, shortness of breath, dizziness, gait/balance disturbances, wound drainage/erythema, pain not controlled by medication, or any other new symptoms.

## 2022-07-30 NOTE — DISCHARGE NOTE PROVIDER - PROVIDER TOKENS
PROVIDER:[TOKEN:[[id number]]],PROVIDER:[TOKEN:[[id number]]],PROVIDER:[TOKEN:[[id number]]],PROVIDER:[TOKEN:[[id number]]],PROVIDER:[TOKEN:[[id number]]]

## 2022-07-30 NOTE — PROGRESS NOTE ADULT - ASSESSMENT
83 y/o female (former smoker) with PMHx of HTN, CAD, HLD, MI, peripheral neuropathy, DM and peripheral artery disease presented to the ED for imaging for her head due to abnormal spine MRI by her orthopedist (Dr. Castro) during workup for her spine for her chronic back problems. The orthopedist noted something in her brain. Otherwise pt has no symptoms, stated that since 04/2022 she has had gait/balance issues. Endorsed that if she sits for a while and gets up, she feels like she is "drunk" and feels off. Denied chest pain, SOB, fevers, chills, numbness/paresthesias, muscular weakness, dizziness, abdominal pain, headaches.    brain mass  - likely metastatic lung cancer  -  mri done  - decadron  - keppra  - seen by NS  - s/p resection of brain mass  - post op care as per NS    lung mass  - pulm following  - bronch was done  -  path adeno ca  - follow up with onc    diabetes  - hold oral hypoglycemics  - insulin ss  - hgb a1c  8.7   - elevated sugars are expected  - endo consult following    HTN  -  cont home meds    cad  - s/p stent  - hold asa and plavix for poss brain surgery    dvt px  - scd's

## 2022-07-30 NOTE — DISCHARGE NOTE PROVIDER - CARE PROVIDERS DIRECT ADDRESSES
,DirectAddress_Unknown,DirectAddress_Unknown,[email or web address],DirectAddress_Unknown,DirectAddress_Unknown

## 2022-07-30 NOTE — PROGRESS NOTE ADULT - ASSESSMENT
[facility] 6/23/21:   s/p successful angioplasty to the mid RCA followed by intracoronary brachytherapy.  [facility] 4/14/21: POBA of mid RCA (80%)   [facility] 3/25/21: PCI/POBA to pCX 90%   Echo 7/22/22: Normal left ventricular internal dimensions with discrete upper septal hypertrophy. Estimated ejection fraction 55%, The inferolateral wall is akinetic.      a/p  83 y/o female (former smoker) with PMHx of HTN, CAD sp PCi,  HLD, MI, peripheral neuropathy, DM and peripheral artery disease presented with abnl imaging on MRI as outpt      #Brain/ Lung  mass   -CT head/ CT chest noted   -work up hem/onc   -neurosx fu noted MRI brain showed 2 mets R temporal 4.7x3.7x3cm L frontal 1.4x1.5x1.2cm   -s/p bronchoscopy  -s/p Right craniotomy for tumor  -cv stable postop- management per neuro    #CAD sp PCI   -resume ASA given PCI hx once cleared by neurosx   -SP lung Bx ; sp craniotomy   -echo w inflat akinesis and overall preserved EF  -c/w BB     #HTN  -stable, c/w meds    dvt ppx    35 minutes spent on total encounter; more than 50% of the visit was spent counseling and/or coordinating care by the attending physician.

## 2022-07-30 NOTE — PROGRESS NOTE ADULT - ASSESSMENT
ASSESSMENT:    82 year old gentlewoman, former smoker, without history of intrinsic lung disease. She has a history of HTN, HLD, DM, CAD s/p PCI (1990s and 2021) and PAD s/p RLE stenting. The patient has been having difficulty ambulating over the last several months due to a sense of imbalance with impaired gait. Her family has noted a change in her personality with pressured speech and insomnia. Outpatient cervical MRI incidentally note a CNS lesion. Lumbar spine MRI performed due to chronic back pain revealed multilevel foraminal stenosis/disc bulges. 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 The patient has no shortness of breath or hypoxemia on room air. She has no cough, sputum production, chest congestion or wheeze. No fevers, chills or sweats. No chest pain/pressure or palpitations.     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    MRI -> right temporal lobe peripherally enhancing, centrally necrotic 3.1 x 2.6 cm mass demonstrating mild diffusion restriction - there is surrounding vasogenic edema - 8 mm leftward midline shift right uncal herniation - additional left parafalcine 1.3 x 0.9 cm rim-enhancing cystic lesion with mild surrounding vasogenic edema - tiny 0.3 x 0.3 cm rim-enhancing lesion within the left frontal cortex - findings are concerning for metastatic disease - the left lateral ventricle is mildly dilated    BP control -> losartan/metoprolol/amlodipine    avoid full dose anticoagulation, ASA or plavix    prophylactic anticonvulsants -> depakote    decadron 4mg q8h po for cerebral edema    analgesics    incentive spirometry     advance diet as tolerated    GI/DVT prophylaxis - protonix/SQ lovenox  endocrinology evaluation noted    insulin regimen being adjusted for steroid induced hyperglycemia  radiation oncology, neurooncology, medical oncology evaluations   cardiology     ASA and plavix are currently on hold in anticipation of neurosurgery - patient had PCI last year    continue lipitor/lopressor/losartan/metoprolol    ECHO - normal LVEF - no significant valvular heart disease  bowel regimen    Will follow with you. Plan of care discussed with the patient and her daughter-in-law by phone and with Crissy Garza and Ebonie. Discharge planning to acute rehab.    Satish Hopkins MD, [facility]  [phone]  Pulmonary Medicine

## 2022-07-30 NOTE — DISCHARGE NOTE PROVIDER - HOSPITAL COURSE
82 year old female (former smoker) with PMHx of HTN, CAD, HLD, MI, peripheral neuropathy, DM and peripheral artery disease presented to the ED for imaging for her head due to abnormal spine MRI by her orthopedist (Dr. Castro) during workup for her spine for her chronic back problems. The orthopedist noted something in her brain. Stated that since 04/2022  she has had gait/balance issues. Endorsed that if she sits for a while and gets up, she feels like she is "drunk" and feels off. Lumbar spine MRI performed due to chronic back pain revealed multilevel foraminal stenosis/disc bulges. CT head -> cystic versus necrotic masses measure approximately in the right temporal lobe and in the medial left frontal lobe. Pt was found to have lung cancer with mets to the brain. Pt was seen by hemeonc and pulmonology, had bronchoscopy done on 7/20. Pt was also seen by endocrinology for multiple thyroid nodules and an adrenal nodule, which pt was advised to follow up as outpatient with endo for further monitoring. On 7/27 pt underwent right crani for tumor resection. Pt was monitored in the ICU postoperatively and postop imaging reviewed by neurosurgery. Pt followed by radiation oncology and patient will need to follow up after discharge.  Pt was also seen by cardiology and hospitalist team. Patient was eventually transferred to the floor for further discharge planning. Seen by PT/OT and recommended for acute rehab. Also seen by rehab medicine who agreed with discharge to acute rehab. Patient is an 82 year old female that presented to ED with abnormal MRI reading (discovered by patient's orthopedic).  Has been having gait/balance issues since April.  Admitted to the neurosurgery service for further management.  CT head -> cystic versus necrotic masses measure approximately in the right temporal lobe and in the medial left frontal lobe. Pt was found to have lung cancer with mets to the brain. Pt was seen by hemeonc and pulmonology, had bronchoscopy done on 7/20. Pt was also seen by endocrinology for multiple thyroid nodules and an adrenal nodule, which pt was advised to follow up as outpatient with endo for further monitoring. On 7/27 pt underwent right crani for tumor resection. Pt was monitored in the ICU postoperatively and postop imaging reviewed by neurosurgery. Pt followed by radiation oncology and patient will need to follow up after discharge.  Pt was also seen by cardiology and hospitalist team. Patient was eventually transferred to the floor for further discharge planning. Seen by PT/OT and recommended for acute rehab. Also seen by rehab medicine who agreed with discharge to acute rehab.  Final pathology pending.  Patient is medically and neurologically stable for discharge.      Please remove surgical site staples on 8/10/2022 (post op day #14). Patient is an 82 year old female that presented to ED with abnormal MRI reading (discovered by patient's orthopedic).  Has been having gait/balance issues since April.  Admitted to the neurosurgery service for further management.  CT head -> cystic versus necrotic masses measure approximately in the right temporal lobe and in the medial left frontal lobe. Pt was found to have lung cancer with likely mets to the brain.  Pt was seen by hemeonc and pulmonology, had bronchoscopy done on 7/20/2022.  Pt was also seen by endocrinology for multiple thyroid nodules and an adrenal nodule, which pt was advised to follow up as outpatient with endo for further monitoring.  On 7/27/2022 pt underwent right crani for tumor resection. Pt was monitored in the ICU postoperatively and postop imaging reviewed by neurosurgery. Pt followed by radiation oncology and patient will need to follow up after discharge.  Pt was also seen by cardiology and medicine team.  Patient was eventually transferred to the floor for further discharge planning.  Seen by PT/OT and recommended for acute rehab.  Also seen by rehab medicine who agreed with discharge to acute rehab.  White blood cell count elevated post op, likely reactive from OR/decadron, has been afebrile, repeat cbc at rehab.  Final pathology pending.  Patient is medically and neurologically stable for discharge.      Please remove surgical site staples on 8/10/2022 (post op day #14).  83 y/o female (former smoker) with PMHx of HTN, CAD, HLD, MI, peripheral neuropathy, DM and peripheral artery disease presented to the ED for imaging for her head due to abnormal spine MRI by her orthopedist (Dr. Castro) during workup for her spine for her chronic back problems Had gait/balance issues since April . . Work up revealed brai  mass. 04/2022 she has had gait/balance issues & feels drunk once sits up for a while . MR brain 7/20/22 with Right temporal lobe peripherally enhancing, centrally necrotic 3.1 x 2.6 cm mass with surrounding vasogenic edema. 8 mm leftward midline shift. Right uncal herniation. Additional left parafalcine 1.3 x 0.9 cm rim-enhancing cystic lesion with mild surrounding vasogenic edema. Tiny 0.3 x 0.3 cm rim-enhancing lesion within the left frontal cortex concerning for metastatic disease . CT chest obstructing left upper lobe endobronchial lesion Had bronchial biopsy consistent with poorly differentiated adenocarcinoma 7/20/22.      s/p Right craniotomy for resection of brain mass 7/27/22, pathology  showing NSC carcinoma (favor adeno) post op STEMI  8/1/22 Transferred to CICU- medical treatment On ASA/ heparin gtt & betablocker . no cath given high risk from comorbidities and recent surgery.    TTE 7/22 with normal LV internal dimensions with discrete upper septal hypertrophy. EF 55%, inferolateral wall is akinetic. 8/3 RML segmental PE , Started on heparin gtt.   Pt noticed with hyperkalemia. Renal consulted. Received Lokelma 8/7 & 8/8  Off ARB since 8/4/22. NSCU consulted for encephalopathy 8/3/22  likely UTI/early urosepsis, Treated with 5 days of Zosyn. Mental status improved  EEG rule out seizures Right temporal sharp waves. No seizures .  Started on Briviact 8/3/22 New onset Afib w RVR 8/4/22 .Transferred to floor 8/6/22  PAT 150s on 8/7/22 . Troponin remains  elevated.  Increased Metoprolol. Heparin anticoagulation switched to Eliquis on 8/9/22 Seen & followed by medicine & cardiology & endocrinology  . Oncology consulted & following.  Plans  for out-pt PET/CT scan, RT, and consideration for systemic therapy. Evaluated by PT/OT & PM&R and recommended for acute rehab Discharged in stable condition

## 2022-07-30 NOTE — DISCHARGE NOTE PROVIDER - NSDCACTIVITY_GEN_ALL_CORE
Do not drive or operate machinery/Do not make important decisions/Walking - Indoors allowed/No heavy lifting/straining/Follow Instructions Provided by your Surgical Team

## 2022-07-30 NOTE — DISCHARGE NOTE PROVIDER - NSDCCPTREATMENT_GEN_ALL_CORE_FT
PRINCIPAL PROCEDURE  Procedure: Right craniotomy for tumor  Findings and Treatment: Sp Rt craniotomy fo tumor resection on 7/27   Please follow up with Neurosurgeon 1-2 weeks after dsicahrge from rehab      SECONDARY PROCEDURE  Procedure: Bronchoscopy  Findings and Treatment: S/p bronchoscopy for lung mass on 7/20.  Please follow up with pulmonology 1-2 weeks after dischagre from rehab

## 2022-07-30 NOTE — PROGRESS NOTE ADULT - SUBJECTIVE AND OBJECTIVE BOX
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; ataxia; frontal lobe syndrome    INTERVAL HISTORY: awake and alert on the neurology lance; much less pressured speech following right craniotomy for tumor resection; remains in bed today due to ongoing ataxia; EEG -> evidence for right hemispheric (right temporal maximal) cortical/subcortical structural lesion - there were no epileptiform abnormalities or seizures recorded; s/p bronchoscopy with endobronchial brushings/biopsies of an obstructing left upper lobe endobronchial lesion -> 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; no shortness of breath or hypoxemia on room air; no cough, sputum production, hemoptysis, chest congestion or wheeze; no fevers, chills or sweats; no chest pain/pressure or palpitations;     REVIEW OF SYSTEMS:  Constitutional: As per interval history  HEENT: Within normal limits  CV: As per interval history  Resp: As per interval history  GI: Within normal limits   : Within normal limits  Musculoskeletal: Within normal limits  Skin: Within normal limits  Neurological: ataxia - pressured speech -> improved  Psychiatric: Within normal limits  Endocrine: Within normal limits  Hematologic/Lymphatic: lung cancer  Allergic/Immunologic: Within normal limits    MEDICATIONS:     Pulmonary "    Anti-microbials:    Cardiovascular:  amLODIPine   Tablet 5 milliGRAM(s) Oral daily  losartan 100 milliGRAM(s) Oral daily  metoprolol tartrate 25 milliGRAM(s) Oral two times a day    Other:  atorvastatin 40 milliGRAM(s) Oral at bedtime  dexAMETHasone     Tablet 4 milliGRAM(s) Oral every 8 hours  dextrose 50% Injectable 12.5 Gram(s) IV Push once  dextrose 50% Injectable 25 Gram(s) IV Push once  dextrose 50% Injectable 25 Gram(s) IV Push once  diVALproex  milliGRAM(s) Oral every 8 hours  enoxaparin Injectable 40 milliGRAM(s) SubCutaneous <User Schedule>  gabapentin 200 milliGRAM(s) Oral at bedtime  insulin glargine Injectable (LANTUS) 24 Unit(s) SubCutaneous at bedtime  insulin lispro (ADMELOG) corrective regimen sliding scale   SubCutaneous Before meals and at bedtime  insulin lispro Injectable (ADMELOG) 6 Unit(s) SubCutaneous before lunch  insulin lispro Injectable (ADMELOG) 12 Unit(s) SubCutaneous before dinner  insulin lispro Injectable (ADMELOG) 12 Unit(s) SubCutaneous before breakfast  pantoprazole    Tablet 40 milliGRAM(s) Oral before breakfast  polyethylene glycol 3350 17 Gram(s) Oral two times a day  senna 2 Tablet(s) Oral at bedtime    MEDICATIONS  (PRN):  acetaminophen     Tablet .. 650 milliGRAM(s) Oral every 6 hours PRN Temp greater or equal to 38C (100.4F), Mild Pain (1 - 3)  bisacodyl 5 milliGRAM(s) Oral daily PRN Constipation  melatonin 5 milliGRAM(s) Oral at bedtime PRN Sleep  ondansetron Injectable 4 milliGRAM(s) IV Push every 6 hours PRN Nausea and/or Vomiting  oxyCODONE    IR 5 milliGRAM(s) Oral every 4 hours PRN Moderate Pain (4 - 6)  oxyCODONE    IR 10 milliGRAM(s) Oral every 4 hours PRN Severe Pain (7 - 10)    OBJECTIVE:    POCT Blood Glucose.: 238 mg/dL (30 Jul 2022 11:29)  POCT Blood Glucose.: 260 mg/dL (30 Jul 2022 07:37)  POCT Blood Glucose.: 235 mg/dL (29 Jul 2022 21:19)  POCT Blood Glucose.: 86 mg/dL (29 Jul 2022 16:21)      PHYSICAL EXAM:       ICU Vital Signs Last 24 Hrs  T(C): 36.6 (30 Jul 2022 13:33), Max: 37.3 (29 Jul 2022 21:40)  T(F): 97.9 (30 Jul 2022 13:33), Max: 99.1 (29 Jul 2022 21:40)  HR: 71 (30 Jul 2022 13:33) (55 - 71)  BP: 118/82 (30 Jul 2022 13:33) (118/82 - 159/78)  BP(mean): --  ABP: --  ABP(mean): --  RR: 18 (30 Jul 2022 13:33) (18 - 18)  SpO2: 96% (30 Jul 2022 13:33) (94% - 98%) on room air     General: Awake. Alert. Cooperative. No distress. Appears stated age. Less pressured speech.	  HEENT:  Right craniotomy incision stapled. Normocephalic. Anicteric. Normal oral mucosa. PERRL. EOMI.  Neck: Supple. Trachea midline. Thyroid without enlargement/tenderness/nodules. No carotid bruit. No JVD.	  Cardiovascular: Regular rate and rhythm. S1 S2 normal. No murmurs, rubs or gallops.  Respiratory: Respirations unlabored. Clear to auscultation and percussion bilaterally. No curvature.  Abdomen: Soft. Non-tender. Non-distended. No organomegaly. No masses. Normal bowel sounds.  Extremities: Warm to touch. No clubbing or cyanosis. No pedal edema. Bilateral SCDs  Pulses: 2+ peripheral pulses all extremities.	  Skin: Craniotomy scar C/D/I. Abrasion on the forehead at the site of the EEG dressing  Lymph Nodes: Cervical, supraclavicular and axillary nodes normal  Neurological: Improved gait. A and O x 3  Psychiatry: Appropriate mood and affect.    LABS:                          12.4   21.01 )-----------( 235      ( 30 Jul 2022 06:07 )             37.1     CBC    WBC  21.01 <==, 23.88 <==, 21.83 <==, 15.37 <==, 16.53 <==, 14.95 <==    Hemoglobin  12.4 <<==, 12.2 <<==, 13.4 <<==, 13.1 <<==, 13.3 <<==, 15.2 <<==    Hematocrit  37.1 <==, 36.3 <==, 39.2 <==, 38.8 <==, 40.4 <==, 47.1 <==    Platelets  235 <==, 254 <==, 342 <==, 309 <==, 329 <==, 374 <==      139  |  102  |  34<H>  ----------------------------<  227<H>    07-30  4.6   |  26  |  0.85      LYTES    sodium  139 <==, 140 <==, 138 <==, 142 <==, 144 <==, 141 <==, 142 <==    potassium   4.6 <==, 4.5 <==, 4.1 <==, 4.1 <==, 3.4 <==, 4.2 <==, 3.3 <==    chloride  102 <==, 104 <==, 103 <==, 110 <==, 105 <==, 107 <==, 102 <==    carbon dioxide  26 <==, 25 <==, 18 <==, 20 <==, 24 <==, 23 <==, 26 <==    =============================================================================================  RENAL FUNCTION:    Creatinine:   0.85  <<==, 1.15  <<==, 0.93  <<==, 0.93  <<==, 1.18  <<==, 1.06  <<==, 1.08  <<==    BUN:   34 <==, 31 <==, 38 <==, 43 <==, 56 <==, 52 <==, 38 <==    ============================================================================================    calcium   9.9 <==, 9.1 <==, 9.1 <==, 9.1 <==, 9.5 <==, 8.8 <==, 9.6 <==    phos   4.2 <==, 4.2 <==    mag   1.9 <==, 1.8 <==    ============================================================================================  LFTs    AST:   14 <==     ALT:  21  <==     AP:  76  <=    Bili:  0.2  <=    PT/INR - ( 27 Jul 2022 07:17 )   PT: 11.6 sec;   INR: 1.00 ratio      ABG - ( 27 Jul 2022 13:13 )  pH: 7.39  /  pCO2: 32    /  pO2: 399   / HCO3: 19    / Base Excess: -4.7  /  SaO2: 100.0     < from: TTE with Doppler (w/Cont) (07.22.22 @ 10:09) >    Patient name: LANIE BERTRAND  YOB: 1940   Age: 82 (F)   MR#: [id number]  Study Date: 7/22/2022  Location: [address] [place], [place] [id number]: Yelena Louis RDCS  Study quality: Technically difficult  Referring Physician: Mk Garza MD  BloodPressure: 116/72 mmHg  Height: 165 cm  Weight: 73 kg  BSA: 1.8 m2  ------------------------------------------------------------------------  PROCEDURE: Transthoracic echocardiogram with 2-D, M-Mode  and complete spectral and color flow Doppler.  INDICATION: Encounter for other preprocedural examination  (Z01.818)  ------------------------------------------------------------------------  Dimensions:    Normal Values:  LA:     3.4    2.0 - 4.0 cm  Ao:     3.1    2.0 - 3.8 cm  SEPTUM: 1.0    0.6 - 1.2 cm  PWT:    0.8    0.6 - 1.1 cm  LVIDd:  4.8    3.0 - 5.6 cm  LVIDs:  3.7    1.8 - 4.0 cm  Derived variables:  LVMI: 82 g/m2  RWT: 0.33  EF (Visual Estimate): 55 %  ------------------------------------------------------------------------  Observations:  Mitral Valve: Mitral annular calcification.  Aortic Valve/Aorta: Normal aortic valve.  Normal aortic root size.  Left Atrium: Normal left atrium.  Left Ventricle: Endocardial visualization enhanced with  intravenous injection of Ultrasonic Enhancing Agent  (Lumason).  Normal left ventricular internal dimensions with discrete  upper septal hypertrophy.  Estimated ejection fraction 55%, The inferolateral wall is  akinetic.  Impaired LV-relaxation with normal filling pressure.  Right Heart: Normal right atrium. Normal right ventricular  size and function.  Normal tricuspid valve.  Normal pulmonic valve. Mild pulmonic regurgitation.  Pericardium/Pleura: Normal pericardium with no pericardial  effusion.  Hemodynamic: Estimated right atrial pressure is normal.  No evidence of pulmonary hypertension.  ------------------------------------------------------------------------  Conclusions:  Endocardial visualization enhanced with intravenous  injection of Ultrasonic Enhancing Agent (Lumason).  Normal left ventricular internal dimensions with discrete  upper septal hypertrophy.  Estimated ejection fraction 55%, The inferolateral wall is  akinetic.  ------------------------------------------------------------------------  Confirmed on  7/22/2022 - 14:42:07 by David Vicente MD, FASE  ------------------------------------------------------------------------  ---------------------------------------------------------------------------------------------------------------  Cytopathology - Non Gyn Report (07.20.22 @ 16:10)   Cytopathology - Non Gyn Report:   ACCESSION No: [id number]   Patient: LANIE BERTRAND   Final Diagnosis   1. LUNG, LEFT UPPER LOBE, BRONCHOALVEOLAR LAVAGE   NEGATIVE FOR MALIGNANT CELLS.   Cytology slide and cell block revel ciliated bronchial cells.   2. LUNG, LEFT, ENDOBRONCHIAL BRUSH   POSITIVE FOR MALIGNANT CELLS.   Non small cell carcinoma favor adenocarcinoma   ---------------------------------------------------------------------------------------------------------------  Surgical Pathology Report (07.20.22 @ 11:53)   Surgical Pathology Report:   1. Left mainstem bronchus biopsy   Final Diagnosis   Bronchus, mainstem, left, biopsy:   - Poorly differentiated adenocarcinoma of lung origin, see comment.   Comment: Immunohistochemical stains: The neoplastic cells are positive   for CAM 5.2 and TTF-1 while negative for P40, CK20, chromogranin,   synaptophysin and CD56. Ki67 reveals a proliferative index of 40%.   In summary the morphology and immunophenotype are consistent with poorly   differentiated adenocarcinoma of lung origin.   Dr. Hopkins was notified of the diagnosis on 07/26/2022.   ---------------------------------------------------------------------------------------------------------------  MICROBIOLOGY:   COVID-19 PCR . (07.18.22 @ 23:25)   COVID-19 PCR: [facility]    Culture - Bronchial (07.20.22 @ 16:52)   Culture Results:   Normal Respiratory Tamy present     Culture - Acid Fast - Bronchial w/Smear (07.20.22 @ 16:52)   Specimen Source: .Bronchial Bronchial Lavage   Acid Fast Bacilli Smear:   No acid fast bacilli seen by fluorochrome stain     RADIOLOGY:  [x] Chest radiographs reviewed and interpreted by me     EXAM:  CT BRAIN                          PROCEDURE DATE:  07/28/2022      INTERPRETATION:  CT head without IV contrast    IMPRESSION:   RIGHT frontotemporal craniotomy with surgical cavity and   RIGHT temporal lobe. Mild pneumocephalus. Moderate residual edema is   noted but improved. There is improved effacement of RIGHT lateral   ventricle with 8 mm subfalcine herniation to the LEFT, improved from   prior.    ADRYAN BROWN MD; Attending Radiologist  This document has been electronically signed. Jul 28 2022 10:13AM  ---------------------------------------------------------------------------------------------------------------  EXAM:  CT ABDOMEN AND PELVIS IC                        EXAM:  CT CHEST IC                          PROCEDURE DATE:  07/18/2022      FINDINGS:  CHEST:  LUNGS AND LARGE AIRWAYS: There is a heterogeneous left upper lobe mass   approximately measuring 3.3 x 2.6 cm (2, 28) obstructing the left apical   posterior bronchus with partial atelectasis of the left upper lobe. Few   scattered bilateral pulmonary nodules measuring up to 3 mm in the right   upper lobe (2, 39).  PLEURA: No pleural effusion.  VESSELS: Atherosclerotic changes of the aorta and coronary arteries.  HEART: Heart size is normal. No pericardial effusion.  MEDIASTINUM AND SIXTO: Soft tissue contiguous with mass versus adenopathy   measuring 1.7 x 1.5 cm (2, 33).  CHEST WALL AND LOWER NECK: Bilateral subcentimeter hypoattenuating   thyroid nodules.    ABDOMEN AND PELVIS:  LIVER: Within normal limits.  BILE DUCTS: Normal caliber.  GALLBLADDER: Within normal limits.  SPLEEN: Within normal limits.  PANCREAS: Within normal limits.  ADRENALS: Indeterminant left adrenal nodule measuring 1.2 cm.  KIDNEYS/URETERS: No hydronephrosis. Renal cysts and subcentimeter   hypoattenuating foci bilaterally, too small to characterize.    BLADDER: Within normal limits.  REPRODUCTIVE ORGANS: Uterus and adnexa within normal limits.    BOWEL: Colonic diverticula withoutevidence of acute diverticulitis. No   bowel obstruction. Appendix is normal.  PERITONEUM: No ascites.  VESSELS: Atherosclerotic changes.  RETROPERITONEUM/LYMPH NODES: No lymphadenopathy.  ABDOMINAL WALL: Within normal limits.  BONES: Degenerative changes. Grade 1 anterolisthesis of L5 on S1. Status   post right shoulder arthroplasty.    IMPRESSION:  Left upper lobe lung mass with partial atelectasis of the left upper   lobe, concerning for primary lung neoplasm. Few scattered sub-4 mm   pulmonary nodules, indeterminate.    Indeterminant left adrenal nodule for which contrast enhanced MRI is   recommended for further evaluation.    DEEPAK HARRINGTON MD; Resident Radiologist  This document has been electronically signed.  HAYLEY DENTON MD; Attending Radiologist  This document has been electronically signed. Jul 19 2022  9:02AM  ---------------------------------------------------------------------------------------------------------------  EXAM:  CT BRAIN                          PROCEDURE DATE:  07/18/2022      FINDINGS:  A cystic versus centrally necrotic mass in the right temporal lobe   measures approximately 4.8 x 3.2 x 3.2 cm (602:15 and 601:33).    There moderate tolarge amount of vasogenic edema in the right frontal,   parietal and temporal lobes.  There is regional mass effect in the right   cerebral hemisphere with partial effacement of the right lateral   ventricle and third ventricle.  There is medial bulging of the right   thalamus across the midline by up to 1.4 cm (2:17).  There is   approximately 5 mm subfalcine subfalcine herniation of the right frontal   lobe underneath the cerebral falx (2:21).    There is an approximately 1.4 x 1.3 x 1.6 cm cystic versus centimeter   necrotic mass in the medial left frontal lobe.  There appears to be trace   edema surrounding this lesion, without significant mass effect.    There is no evidence of acute intracranial hemorrhage, transtentorial   herniation or acute territorial infarct.  Mild prominence the left   lateral ventricle may be related to pre-existing cerebral volume loss   rather than hydrocephalus.    The paranasal sinuses and mastoids are grossly clear.    The patient is status post cataractlens placement surgery bilaterally.    The calvarium and skull base appear within normal limits.    IMPRESSION:  Cystic versus a necrotic masses measure approximately 4.8 x 3.2 x 3.2 cm   in the right temporal lobe and 1.4 x 1.3 x 1.6 cm in the medial left   frontal lobe.    Moderate to large amount of vasogenic edema in the right frontal,   parietal and temporal lobes.  Trace edema in the left frontal lobe.    Regional mass effect in the right cerebral hemisphere with partial   effacement of theventricular system and 5 mm subfalcine herniation of   the right frontal lobe underneath the cerebral falx.    The findings may represent intracranial metastatic disease versus   glioblastoma.  Contrast-enhanced brain MRI is recommended for further   evaluation.      Findings discussed with DAVID Albarran by Dr. Harrington at 2153 hours on   7/8/2022 with readback confirmation.    DEEPAK HARRINGTON MD; Resident Radiologist  This document has been electronically signed.  PHILLY CISSE MD; Attending Radiologist  This document has been electronically signed. Jul 18 2022 10:15PM  ---------------------------------------------------------------------------------------------------------------  EXAM:  MR BRAIN WAW IC                          PROCEDURE DATE:  07/20/2022      FINDINGS:    Right temporal lobe peripherally enhancing, centrally necrotic 3.1 x 2.6   cm mass demonstrating mild diffusion restriction. There is surrounding   vasogenic edema. 8 mm leftward midline shift. Right uncal herniation.   Additional left parafalcine 1.3 x 0.9 cm rim-enhancing cystic lesion with   mild surrounding vasogenic edema. Tiny 0.3 x 0.3 cm rim-enhancing lesion   within the left frontal cortex (10:125).    Scattered foci of T2/FLAIR hyperintensity in the bilateral hemispheric   white matter are nonspecific but likely related to chronic white matter   microvascular ischemic disease. The left lateral ventricle is mildly   dilated. Flow voids of the major intracranial vessels at the skull base   follow expected course and contour.    The paranasal sinuses demonstrate no signal abnormality. The mastoids   demonstrate no signal abnormality.  Status post bilateral intraocular   lens implants.      IMPRESSION:  Right temporal lobe peripherally enhancing, centrally necrotic 3.1 x 2.6   cm mass demonstrating mild diffusion restriction. There is surrounding   vasogenic edema. 8 mm leftward midline shift. Right uncal herniation.   Additional left parafalcine 1.3 x 0.9 cm rim-enhancing cystic lesion with   mild surrounding vasogenic edema. Tiny 0.3 x 0.3 cm rim-enhancing lesion   within the left frontal cortex. Findings are concerning for metastatic   disease in the context of patient's known primary lung cancer. The left   lateral ventricle is mildly dilated.    NEIL DIOP MD; Resident Radiologist  This document has been electronically signed.  NERI BARRERA MD; Attending Radiologist  This document has been electronically signed. Jul 21 2022  4:26PM  ---------------------------------------------------------------------------------------------------------------  EXAM:  DUPLEX SCAN EXT VEINS LOWER BI                          PROCEDURE DATE:  07/28/2022      IMPRESSION:  No evidence of deep venous thrombosis in either lower extremity.    YENIFER QUAN MD; Attending Radiologist  This document has been electronically signed. Jul 28 2022  2:08PM  ---------------------------------------------------------------------------------------------------------------

## 2022-07-30 NOTE — PROGRESS NOTE ADULT - SUBJECTIVE AND OBJECTIVE BOX
POD# 3 s/p  R craniotomy for tumor resection, likely metastatic lung cancer     Overnight event: no acute event    Vital Signs Last 24 Hrs  T(C): 36.5 (30 Jul 2022 09:26), Max: 37.3 (29 Jul 2022 21:40)  T(F): 97.7 (30 Jul 2022 09:26), Max: 99.1 (29 Jul 2022 21:40)  HR: 62 (30 Jul 2022 09:26) (55 - 73)  BP: 123/76 (30 Jul 2022 09:26) (123/76 - 159/78)  BP(mean): --  RR: 18 (30 Jul 2022 09:26) (18 - 18)  SpO2: 97% (30 Jul 2022 09:26) (94% - 98%)    Parameters below as of 30 Jul 2022 04:57  Patient On (Oxygen Delivery Method): room air                              12.4   21.01 )-----------( 235      ( 30 Jul 2022 06:07 )             37.1    07-30    139  |  102  |  34<H>  ----------------------------<  227<H>  4.6   |  26  |  0.85    Ca    9.9      30 Jul 2022 06:07  Phos  4.2     07-28  Mg     1.9     07-28       Stroke Core Measures      DRAIN OUTPUT:     NEUROIMAGING:     PHYSICAL EXAM:    General: No Acute Distress     Neurological: Awake, alert oriented to person, place and time, Following Commands, PERRL, EOMI, mild Rt Facial, Speech Fluent, Moving all extremities, Muscle Strength normal in all four extremities, No Drift, Sensation to Light Touch Intact    Pulmonary: Clear to Auscultation, No Rales, No Rhonchi, No Wheezes     Cardiovascular: S1, S2, Regular Rate and Rhythm     Gastrointestinal: Soft, Nontender, Nondistended     Incision:     MEDICATIONS:   Antibiotics:    Neuro:  acetaminophen     Tablet .. 650 milliGRAM(s) Oral every 6 hours PRN Temp greater or equal to 38C (100.4F), Mild Pain (1 - 3)  diVALproex  milliGRAM(s) Oral every 8 hours  gabapentin 200 milliGRAM(s) Oral at bedtime  melatonin 5 milliGRAM(s) Oral at bedtime PRN Sleep  ondansetron Injectable 4 milliGRAM(s) IV Push every 6 hours PRN Nausea and/or Vomiting  oxyCODONE    IR 5 milliGRAM(s) Oral every 4 hours PRN Moderate Pain (4 - 6)  oxyCODONE    IR 10 milliGRAM(s) Oral every 4 hours PRN Severe Pain (7 - 10)    Anticoagulation:  enoxaparin Injectable 40 milliGRAM(s) SubCutaneous <User Schedule>    Cardiology:  amLODIPine   Tablet 5 milliGRAM(s) Oral daily  losartan 100 milliGRAM(s) Oral daily  metoprolol tartrate 25 milliGRAM(s) Oral two times a day    Endo:   atorvastatin 40 milliGRAM(s) Oral at bedtime  dexAMETHasone     Tablet 4 milliGRAM(s) Oral every 8 hours  dextrose 50% Injectable 12.5 Gram(s) IV Push once  dextrose 50% Injectable 25 Gram(s) IV Push once  dextrose 50% Injectable 25 Gram(s) IV Push once  insulin glargine Injectable (LANTUS) 20 Unit(s) SubCutaneous at bedtime  insulin lispro (ADMELOG) corrective regimen sliding scale   SubCutaneous Before meals and at bedtime  insulin lispro Injectable (ADMELOG) 8 Unit(s) SubCutaneous before lunch  insulin lispro Injectable (ADMELOG) 12 Unit(s) SubCutaneous before dinner  insulin lispro Injectable (ADMELOG) 12 Unit(s) SubCutaneous before breakfast    Pulm:    GI/:  bisacodyl 5 milliGRAM(s) Oral daily PRN  pantoprazole    Tablet 40 milliGRAM(s) Oral before breakfast  polyethylene glycol 3350 17 Gram(s) Oral two times a day  senna 2 Tablet(s) Oral at bedtime    Other:      ASSESSMENT:   bisacodyl 5 milliGRAM(s) Oral daily PRN  pantoprazole    Tablet 40 milliGRAM(s) Oral before breakfast  polyethylene glycol 3350 17 Gram(s) Oral two times a day  senna 2 Tablet(s) Oral at bedtime   s/p    PLAN:  NEURO:  CARDIOVASCULAR:  PULMONARY:  RENAL:  GI:  HEME:  ID:  ENDO:    DVT PROPHYLAXIS:  [] Venodynes                                [] Heparin/Lovenox    FALL RISK:  [] Low Risk                                    [] Impulsive POD# 3 s/p  R craniotomy for tumor resection, likely metastatic lung cancer     Overnight event: no acute event    Vital Signs Last 24 Hrs  T(C): 36.5 (30 Jul 2022 09:26), Max: 37.3 (29 Jul 2022 21:40)  T(F): 97.7 (30 Jul 2022 09:26), Max: 99.1 (29 Jul 2022 21:40)  HR: 62 (30 Jul 2022 09:26) (55 - 73)  BP: 123/76 (30 Jul 2022 09:26) (123/76 - 159/78)  BP(mean): --  RR: 18 (30 Jul 2022 09:26) (18 - 18)  SpO2: 97% (30 Jul 2022 09:26) (94% - 98%)    Parameters below as of 30 Jul 2022 04:57  Patient On (Oxygen Delivery Method): room air                              12.4   21.01 )-----------( 235      ( 30 Jul 2022 06:07 )             37.1    07-30    139  |  102  |  34<H>  ----------------------------<  227<H>  4.6   |  26  |  0.85    Ca    9.9      30 Jul 2022 06:07  Phos  4.2     07-28  Mg     1.9     07-28       Stroke Core Measures      DRAIN OUTPUT:     NEUROIMAGING:     PHYSICAL EXAM:    General: No Acute Distress     Neurological: Awake, alert oriented to person, place and time, Following Commands, PERRL, EOMI, mild Rt Facial, Speech Fluent, Moving all extremities, Muscle Strength normal in all four extremities, No Drift, Sensation to Light Touch Intact    Pulmonary: Clear to Auscultation, No Rales, No Rhonchi, No Wheezes     Cardiovascular: S1, S2, Regular Rate and Rhythm     Gastrointestinal: Soft, Nontender, Nondistended     Incision: incision intact, forehead hematoma no evidence of infection or breakdown    MEDICATIONS:   Antibiotics:    Neuro:  acetaminophen     Tablet .. 650 milliGRAM(s) Oral every 6 hours PRN Temp greater or equal to 38C (100.4F), Mild Pain (1 - 3)  diVALproex  milliGRAM(s) Oral every 8 hours  gabapentin 200 milliGRAM(s) Oral at bedtime  melatonin 5 milliGRAM(s) Oral at bedtime PRN Sleep  ondansetron Injectable 4 milliGRAM(s) IV Push every 6 hours PRN Nausea and/or Vomiting  oxyCODONE    IR 5 milliGRAM(s) Oral every 4 hours PRN Moderate Pain (4 - 6)  oxyCODONE    IR 10 milliGRAM(s) Oral every 4 hours PRN Severe Pain (7 - 10)    Anticoagulation:  enoxaparin Injectable 40 milliGRAM(s) SubCutaneous <User Schedule>    Cardiology:  amLODIPine   Tablet 5 milliGRAM(s) Oral daily  losartan 100 milliGRAM(s) Oral daily  metoprolol tartrate 25 milliGRAM(s) Oral two times a day    Endo:   atorvastatin 40 milliGRAM(s) Oral at bedtime  dexAMETHasone     Tablet 4 milliGRAM(s) Oral every 8 hours  dextrose 50% Injectable 12.5 Gram(s) IV Push once  dextrose 50% Injectable 25 Gram(s) IV Push once  dextrose 50% Injectable 25 Gram(s) IV Push once  insulin glargine Injectable (LANTUS) 20 Unit(s) SubCutaneous at bedtime  insulin lispro (ADMELOG) corrective regimen sliding scale   SubCutaneous Before meals and at bedtime  insulin lispro Injectable (ADMELOG) 8 Unit(s) SubCutaneous before lunch  insulin lispro Injectable (ADMELOG) 12 Unit(s) SubCutaneous before dinner  insulin lispro Injectable (ADMELOG) 12 Unit(s) SubCutaneous before breakfast    Pulm:    GI/:  bisacodyl 5 milliGRAM(s) Oral daily PRN  pantoprazole    Tablet 40 milliGRAM(s) Oral before breakfast  polyethylene glycol 3350 17 Gram(s) Oral two times a day  senna 2 Tablet(s) Oral at bedtime    Other:  bisacodyl 5 milliGRAM(s) Oral daily PRN  pantoprazole    Tablet 40 milliGRAM(s) Oral before breakfast  polyethylene glycol 3350 17 Gram(s) Oral two times a day  senna 2 Tablet(s) Oral at bedtime

## 2022-07-30 NOTE — DISCHARGE NOTE PROVIDER - NSDCFUADDAPPT_GEN_ALL_CORE_FT
follow up with neurosurgery, Dr. Loomis, within 1 week of discharge from rehab, call [phone] follow up with hematology, Dr. Alexander, within 1 week of discharge from rehab, call [phone]. Needs outpatient PET scan & plans for systemic therapy & radiation  follow up with radiation oncology, Dr. Funes, within 1 week of discharge from rehab, call [phone] follow up with cardiology, Dr. Alcaraz, within 1 week of discharge from rehab, call [phone] follow up with PMD within 1 week of discharge from rehab

## 2022-07-30 NOTE — PROGRESS NOTE ADULT - ASSESSMENT
Assessment  DMT2: 82y Female with DM T2 with hyperglycemia, A1C 8.4%, was on oral meds and insulin at home, pt planed to DC home today    FS improving in the  afternoon, no hypoglycemias. Patient is postop resection of brain mass, eating meals with fair appetite per discussion with nursing staff, remains on IV steroids.  Brain Mass: postop, on meds, monitored, FU Neurosurgery.  Lung Mass: s/p bronchoscopy, CA workup in progress, monitored.  Thyroid Nodules: seen on imaging, B/L subcentimeter thyroid nodules, she denies neck mass/pain/dysphagia, euthyroid, reports known history and is being followed by Dr. Richardson.  Adrenal Nodule: seen on imaging, 1.2 cm left thyroid nodule, unknown history. AM cortisol suppressed 2/2 current steroid use, aldosterone and metanephrine labs in normal range.        Troy Morelia ESPINOZA  [phone]               Assessment  DMT2: 82y Female with DM T2 with hyperglycemia, A1C 8.4%, was on oral meds and insulin at home,posible discharge to rehab today, still on steroids    FS improving in the  afternoon, no hypoglycemias. Patient is postop resection of brain mass, eating meals with fair appetite per discussion with nursing staff, remains on IV steroids.  Brain Mass: postop, on meds, monitored, FU Neurosurgery.  Lung Mass: s/p bronchoscopy, CA workup in progress, monitored.  Thyroid Nodules: seen on imaging, B/L subcentimeter thyroid nodules, she denies neck mass/pain/dysphagia, euthyroid, reports known history and is being followed by Dr. Richardson.  Adrenal Nodule: seen on imaging, 1.2 cm left thyroid nodule, unknown history. AM cortisol suppressed 2/2 current steroid use, aldosterone and metanephrine labs in normal range.        Troy Morelia ESPINOZA  [phone]

## 2022-07-30 NOTE — DISCHARGE NOTE PROVIDER - NSDCMRMEDTOKEN_GEN_ALL_CORE_FT
aspirin 81 mg oral tablet: 1 tab(s) orally once a day  gabapentin 100 mg oral capsule: 2 cap(s) orally once a day (at bedtime)  glimepiride 4 mg oral tablet: 1 tab(s) orally 2 times a day  losartan-hydrochlorothiazide 100 mg-25 mg oral tablet: 1 tab(s) orally once a day  metFORMIN 1000 mg oral tablet: 1 tab(s) orally 2 times a day    metoprolol tartrate 50 mg oral tablet: 1 tab(s) orally 3 times a day  Plavix 75 mg oral tablet: 1 tab(s) orally once a day MDD:1  simvastatin 40 mg oral tablet: 1 tab(s) orally once a day (at bedtime)  Toujeo Max SoloStar 300 units/mL subcutaneous solution: 6 unit(s) subcutaneous 2 times a day as directed  Victoza 18 mg/3 mL subcutaneous solution: 0.6 milligram(s) subcutaneous once a day   acetaminophen 325 mg oral tablet: 2 tab(s) orally every 6 hours, As needed, Temp greater or equal to 38C (100.4F), Mild Pain (1 - 3)  amLODIPine 5 mg oral tablet: 1 tab(s) orally once a day  atorvastatin 40 mg oral tablet: 1 tab(s) orally once a day (at bedtime)  bisacodyl 5 mg oral delayed release tablet: 1 tab(s) orally once a day, As needed, Constipation  dexamethasone 2 mg oral tablet: 1 tab po every 8 hours for 2 days, then 1 tab po q12 hours x2 days, then 1 tab po daily x3 days, then stop  divalproex sodium 500 mg oral delayed release tablet: 1 tab(s) orally every 8 hours  enoxaparin: 40 milligram(s) subcutaneous once a day (at bedtime)  gabapentin 100 mg oral capsule: 2 cap(s) orally once a day (at bedtime)  insulin glargine 100 units/mL subcutaneous solution: 15 unit(s) subcutaneous once a day (at bedtime)  insulin lispro 100 units/mL injectable solution: 5 unit(s) subcutaneous once a day before lunch  insulin lispro 100 units/mL injectable solution: 7 unit(s) subcutaneous once a day before breakfast  insulin lispro 100 units/mL injectable solution: 7 unit(s) subcutaneous once a day before dinner  insulin lispro 100 units/mL injectable solution: moderate dose sliding scale as per hospital/facility protocol, 3 times a day (before meals)  losartan 100 mg oral tablet: 1 tab(s) orally once a day  melatonin 5 mg oral tablet: 1 tab(s) orally once a day (at bedtime), As needed, Sleep  metoprolol tartrate 25 mg oral tablet: 1 tab(s) orally 2 times a day  oxyCODONE 10 mg oral tablet: 1 tab(s) orally every 4 hours, As needed, Severe Pain (7 - 10)  oxyCODONE 5 mg oral tablet: 1 tab(s) orally every 4 hours, As needed, Moderate Pain (4 - 6)  pantoprazole 40 mg oral delayed release tablet: 1 tab(s) orally once a day (before a meal)  polyethylene glycol 3350 oral powder for reconstitution: 17 gram(s) orally 2 times a day  senna leaf extract oral tablet: 2 tab(s) orally once a day (at bedtime)   acetaminophen 325 mg oral tablet: 2 tab(s) orally every 6 hours, As needed, , Mild - moderate pain  apixaban 5 mg oral tablet: 1 tab(s) orally every 12 hours  aspirin 81 mg oral tablet, chewable: 1 tab(s) orally once a day  atorvastatin 40 mg oral tablet: 1 tab(s) orally once a day (at bedtime)  bacitracin 500 units/g topical ointment: 1 application topically 2 times a day x 7 days on  healing blisters   bisacodyl 5 mg oral delayed release tablet: 1 tab(s) orally once a day, As needed, Constipation  brivaracetam 50 mg oral tablet: 1 tab(s) orally 2 times a day  gabapentin 100 mg oral capsule: 2 cap(s) orally once a day (at bedtime)  insulin glargine 100 units/mL subcutaneous solution: 28 unit(s) subcutaneous once a day (at bedtime)  insulin lispro 100 units/mL injectable solution: 10 unit(s) subcutaneous 3 times a day (with meals)  insulin lispro 100 units/mL injectable solution: low  dose sliding scale as per hospital/facility protocol, 3 times a day (before meals) &amp; bedtime   melatonin 5 mg oral tablet: 1 tab(s) orally once a day (at bedtime), As needed, Sleep  metoprolol tartrate 25 mg oral tablet: 1 tab(s) orally 2 times a day  pantoprazole 40 mg oral delayed release tablet: 1 tab(s) orally once a day (before a meal)  senna leaf extract oral tablet: 2 tab(s) orally once a day (at bedtime)

## 2022-07-31 LAB
ANION GAP SERPL CALC-SCNC: 11 MMOL/L — SIGNIFICANT CHANGE UP (ref 5–17)
BUN SERPL-MCNC: 42 MG/DL — HIGH (ref 7–23)
CALCIUM SERPL-MCNC: 9.2 MG/DL — SIGNIFICANT CHANGE UP (ref 8.4–10.5)
CHLORIDE SERPL-SCNC: 102 MMOL/L — SIGNIFICANT CHANGE UP (ref 96–108)
CO2 SERPL-SCNC: 28 MMOL/L — SIGNIFICANT CHANGE UP (ref 22–31)
CREAT SERPL-MCNC: 0.94 MG/DL — SIGNIFICANT CHANGE UP (ref 0.5–1.3)
EGFR: 61 ML/MIN/1.73M2 — SIGNIFICANT CHANGE UP
GLUCOSE BLDC GLUCOMTR-MCNC: 124 MG/DL — HIGH (ref 70–99)
GLUCOSE BLDC GLUCOMTR-MCNC: 144 MG/DL — HIGH (ref 70–99)
GLUCOSE BLDC GLUCOMTR-MCNC: 199 MG/DL — HIGH (ref 70–99)
GLUCOSE BLDC GLUCOMTR-MCNC: 76 MG/DL — SIGNIFICANT CHANGE UP (ref 70–99)
GLUCOSE BLDC GLUCOMTR-MCNC: 91 MG/DL — SIGNIFICANT CHANGE UP (ref 70–99)
GLUCOSE SERPL-MCNC: 97 MG/DL — SIGNIFICANT CHANGE UP (ref 70–99)
HCT VFR BLD CALC: 38.5 % — SIGNIFICANT CHANGE UP (ref 34.5–45)
HGB BLD-MCNC: 12.8 G/DL — SIGNIFICANT CHANGE UP (ref 11.5–15.5)
MCHC RBC-ENTMCNC: 31 PG — SIGNIFICANT CHANGE UP (ref 27–34)
MCHC RBC-ENTMCNC: 33.2 GM/DL — SIGNIFICANT CHANGE UP (ref 32–36)
MCV RBC AUTO: 93.2 FL — SIGNIFICANT CHANGE UP (ref 80–100)
NRBC # BLD: 0 /100 WBCS — SIGNIFICANT CHANGE UP (ref 0–0)
PLATELET # BLD AUTO: 258 K/UL — SIGNIFICANT CHANGE UP (ref 150–400)
POTASSIUM SERPL-MCNC: 4.4 MMOL/L — SIGNIFICANT CHANGE UP (ref 3.5–5.3)
POTASSIUM SERPL-SCNC: 4.4 MMOL/L — SIGNIFICANT CHANGE UP (ref 3.5–5.3)
RBC # BLD: 4.13 M/UL — SIGNIFICANT CHANGE UP (ref 3.8–5.2)
RBC # FLD: 12.3 % — SIGNIFICANT CHANGE UP (ref 10.3–14.5)
SARS-COV-2 RNA SPEC QL NAA+PROBE: SIGNIFICANT CHANGE UP
SODIUM SERPL-SCNC: 141 MMOL/L — SIGNIFICANT CHANGE UP (ref 135–145)
WBC # BLD: 19 K/UL — HIGH (ref 3.8–10.5)
WBC # FLD AUTO: 19 K/UL — HIGH (ref 3.8–10.5)

## 2022-07-31 RX ORDER — SODIUM CHLORIDE 9 MG/ML
1000 INJECTION, SOLUTION INTRAVENOUS
Refills: 0 | Status: DISCONTINUED | OUTPATIENT
Start: 2022-07-31 | End: 2022-08-01

## 2022-07-31 RX ORDER — DEXTROSE 50 % IN WATER 50 %
15 SYRINGE (ML) INTRAVENOUS ONCE
Refills: 0 | Status: DISCONTINUED | OUTPATIENT
Start: 2022-07-31 | End: 2022-08-01

## 2022-07-31 RX ORDER — INSULIN LISPRO 100/ML
5 VIAL (ML) SUBCUTANEOUS
Refills: 0 | Status: DISCONTINUED | OUTPATIENT
Start: 2022-07-31 | End: 2022-08-04

## 2022-07-31 RX ORDER — INSULIN LISPRO 100/ML
9 VIAL (ML) SUBCUTANEOUS
Refills: 0 | Status: DISCONTINUED | OUTPATIENT
Start: 2022-07-31 | End: 2022-08-01

## 2022-07-31 RX ORDER — INSULIN LISPRO 100/ML
9 VIAL (ML) SUBCUTANEOUS
Refills: 0 | Status: DISCONTINUED | OUTPATIENT
Start: 2022-08-01 | End: 2022-08-01

## 2022-07-31 RX ORDER — GLUCAGON INJECTION, SOLUTION 0.5 MG/.1ML
1 INJECTION, SOLUTION SUBCUTANEOUS ONCE
Refills: 0 | Status: DISCONTINUED | OUTPATIENT
Start: 2022-07-31 | End: 2022-08-01

## 2022-07-31 RX ORDER — DEXAMETHASONE 0.5 MG/5ML
3 ELIXIR ORAL EVERY 8 HOURS
Refills: 0 | Status: DISCONTINUED | OUTPATIENT
Start: 2022-07-31 | End: 2022-08-01

## 2022-07-31 RX ORDER — INSULIN GLARGINE 100 [IU]/ML
20 INJECTION, SOLUTION SUBCUTANEOUS AT BEDTIME
Refills: 0 | Status: DISCONTINUED | OUTPATIENT
Start: 2022-07-31 | End: 2022-07-31

## 2022-07-31 RX ORDER — INSULIN GLARGINE 100 [IU]/ML
10 INJECTION, SOLUTION SUBCUTANEOUS AT BEDTIME
Refills: 0 | Status: DISCONTINUED | OUTPATIENT
Start: 2022-07-31 | End: 2022-08-01

## 2022-07-31 RX ADMIN — Medication 2: at 22:16

## 2022-07-31 RX ADMIN — Medication 3 MILLIGRAM(S): at 13:05

## 2022-07-31 RX ADMIN — LOSARTAN POTASSIUM 100 MILLIGRAM(S): 100 TABLET, FILM COATED ORAL at 05:04

## 2022-07-31 RX ADMIN — INSULIN GLARGINE 10 UNIT(S): 100 INJECTION, SOLUTION SUBCUTANEOUS at 22:22

## 2022-07-31 RX ADMIN — Medication 12 UNIT(S): at 08:02

## 2022-07-31 RX ADMIN — Medication 5 MILLIGRAM(S): at 21:24

## 2022-07-31 RX ADMIN — DIVALPROEX SODIUM 500 MILLIGRAM(S): 500 TABLET, DELAYED RELEASE ORAL at 21:24

## 2022-07-31 RX ADMIN — ENOXAPARIN SODIUM 40 MILLIGRAM(S): 100 INJECTION SUBCUTANEOUS at 16:53

## 2022-07-31 RX ADMIN — Medication 5 MILLIGRAM(S): at 16:53

## 2022-07-31 RX ADMIN — GABAPENTIN 200 MILLIGRAM(S): 400 CAPSULE ORAL at 21:25

## 2022-07-31 RX ADMIN — DIVALPROEX SODIUM 500 MILLIGRAM(S): 500 TABLET, DELAYED RELEASE ORAL at 13:05

## 2022-07-31 RX ADMIN — POLYETHYLENE GLYCOL 3350 17 GRAM(S): 17 POWDER, FOR SOLUTION ORAL at 16:53

## 2022-07-31 RX ADMIN — SENNA PLUS 2 TABLET(S): 8.6 TABLET ORAL at 21:25

## 2022-07-31 RX ADMIN — Medication 5 UNIT(S): at 12:21

## 2022-07-31 RX ADMIN — POLYETHYLENE GLYCOL 3350 17 GRAM(S): 17 POWDER, FOR SOLUTION ORAL at 05:07

## 2022-07-31 RX ADMIN — Medication 3 MILLIGRAM(S): at 21:24

## 2022-07-31 RX ADMIN — DIVALPROEX SODIUM 500 MILLIGRAM(S): 500 TABLET, DELAYED RELEASE ORAL at 05:03

## 2022-07-31 RX ADMIN — Medication 9 UNIT(S): at 16:55

## 2022-07-31 RX ADMIN — AMLODIPINE BESYLATE 5 MILLIGRAM(S): 2.5 TABLET ORAL at 05:04

## 2022-07-31 RX ADMIN — Medication 4 MILLIGRAM(S): at 05:03

## 2022-07-31 RX ADMIN — PANTOPRAZOLE SODIUM 40 MILLIGRAM(S): 20 TABLET, DELAYED RELEASE ORAL at 05:03

## 2022-07-31 RX ADMIN — Medication 25 MILLIGRAM(S): at 16:54

## 2022-07-31 RX ADMIN — ATORVASTATIN CALCIUM 40 MILLIGRAM(S): 80 TABLET, FILM COATED ORAL at 21:25

## 2022-07-31 RX ADMIN — Medication 25 MILLIGRAM(S): at 05:04

## 2022-07-31 NOTE — PROGRESS NOTE ADULT - ASSESSMENT
83 y/o female (former smoker) with PMHx of HTN, CAD, HLD, MI, peripheral neuropathy, DM and peripheral artery disease presented to the ED for imaging for her head due to abnormal spine MRI by her orthopedist (Dr. Catsro) during workup for her spine for her chronic back problems. The orthopedist noted something in her brain. Otherwise pt has no symptoms, stated that since 04/2022 she has had gait/balance issues. Endorsed that if she sits for a while and gets up, she feels like she is "drunk" and feels off. Denied chest pain, SOB, fevers, chills, numbness/paresthesias, muscular weakness, dizziness, abdominal pain, headaches.    brain mass  - likely metastatic lung cancer  -  mri done  - decadron  - keppra  - seen by NS  - s/p resection of brain mass  - post op care as per NS    lung mass  - pulm following  - bronch was done  -  path adeno ca  - follow up with onc    diabetes  - hold oral hypoglycemics  - insulin ss  - hgb a1c  8.7   - elevated sugars are expected  - endo consult following    HTN  -  cont home meds    cad  - s/p stent  - hold asa and plavix for poss brain surgery    dvt px  - scd's

## 2022-07-31 NOTE — PROGRESS NOTE ADULT - SUBJECTIVE AND OBJECTIVE BOX
Chief complaint  Patient is a 82y old  Female who presents with a chief complaint of abnormal imaging (31 Jul 2022 12:25)   Review of systems  Patient in bed, looks comfortable, no hypoglycemic episodes.    Labs and Fingersticks  CAPILLARY BLOOD GLUCOSE      POCT Blood Glucose.: 124 mg/dL (31 Jul 2022 12:03)  POCT Blood Glucose.: 91 mg/dL (31 Jul 2022 07:42)  POCT Blood Glucose.: 147 mg/dL (30 Jul 2022 21:37)  POCT Blood Glucose.: 153 mg/dL (30 Jul 2022 16:27)      Anion Gap, Serum: 11 (07-31 @ 07:13)  Anion Gap, Serum: 11 (07-30 @ 06:07)      Calcium, Total Serum: 9.2 (07-31 @ 07:13)  Calcium, Total Serum: 9.9 (07-30 @ 06:07)          07-31    141  |  102  |  42<H>  ----------------------------<  97  4.4   |  28  |  0.94    Ca    9.2      31 Jul 2022 07:13                          12.8   19.00 )-----------( 258      ( 31 Jul 2022 07:34 )             38.5     Medications  MEDICATIONS  (STANDING):  amLODIPine   Tablet 5 milliGRAM(s) Oral daily  atorvastatin 40 milliGRAM(s) Oral at bedtime  dexAMETHasone     Tablet 3 milliGRAM(s) Oral every 8 hours  dextrose 50% Injectable 25 Gram(s) IV Push once  dextrose 50% Injectable 12.5 Gram(s) IV Push once  dextrose 50% Injectable 25 Gram(s) IV Push once  diVALproex  milliGRAM(s) Oral every 8 hours  enoxaparin Injectable 40 milliGRAM(s) SubCutaneous <User Schedule>  gabapentin 200 milliGRAM(s) Oral at bedtime  insulin glargine Injectable (LANTUS) 20 Unit(s) SubCutaneous at bedtime  insulin lispro (ADMELOG) corrective regimen sliding scale   SubCutaneous Before meals and at bedtime  insulin lispro Injectable (ADMELOG) 9 Unit(s) SubCutaneous before dinner  insulin lispro Injectable (ADMELOG) 5 Unit(s) SubCutaneous before lunch  losartan 100 milliGRAM(s) Oral daily  metoprolol tartrate 25 milliGRAM(s) Oral two times a day  pantoprazole    Tablet 40 milliGRAM(s) Oral before breakfast  polyethylene glycol 3350 17 Gram(s) Oral two times a day  senna 2 Tablet(s) Oral at bedtime      Physical Exam  General: Patient comfortable in bed  Vital Signs Last 12 Hrs  T(F): 98.2 (07-31-22 @ 11:39), Max: 98.2 (07-31-22 @ 11:39)  HR: 72 (07-31-22 @ 11:39) (61 - 72)  BP: 122/59 (07-31-22 @ 11:39) (122/59 - 142/70)  BP(mean): --  RR: 18 (07-31-22 @ 11:39) (18 - 18)  SpO2: 95% (07-31-22 @ 11:39) (95% - 95%)  Neck: No palpable thyroid nodules.  CVS: S1S2, No murmurs  Respiratory: No wheezing, no crepitations  GI: Abdomen soft, bowel sounds positive  Musculoskeletal:  edema lower extremities.   Skin: No skin rashes, no ecchymosis    Diagnostics    Metanephrine, Plasma: AM Sched. Collection: 21-Jul-2022 06:00 (07-20 @ 10:54)  Aldosterone, Serum: AM Sched. Collection: 21-Jul-2022 06:00 (07-20 @ 10:54)  Cortisol AM, Serum: AM Sched. Collection: 21-Jul-2022 06:00 (07-20 @ 10:54)

## 2022-07-31 NOTE — PROGRESS NOTE ADULT - ASSESSMENT
[facility] 6/23/21:   s/p successful angioplasty to the mid RCA followed by intracoronary brachytherapy.  [facility] 4/14/21: POBA of mid RCA (80%)   [facility] 3/25/21: PCI/POBA to pCX 90%   Echo 7/22/22: Normal left ventricular internal dimensions with discrete upper septal hypertrophy. Estimated ejection fraction 55%, The inferolateral wall is akinetic.      a/p  81 y/o female (former smoker) with PMHx of HTN, CAD sp PCi,  HLD, MI, peripheral neuropathy, DM and peripheral artery disease presented with abnl imaging on MRI as outpt      #Brain/ Lung  mass   -CT head/ CT chest noted   -work up hem/onc   -neurosx fu noted MRI brain showed 2 mets R temporal 4.7x3.7x3cm L frontal 1.4x1.5x1.2cm   -s/p bronchoscopy  -s/p Right craniotomy for tumor  -cv stable postop- management per neuro    #CAD sp PCI   -resume ASA given PCI hx once cleared by neurosx   -SP lung Bx ; sp craniotomy   -echo w inflat akinesis and overall preserved EF  -c/w BB     #HTN  -stable, c/w meds    dvt ppx    35 minutes spent on total encounter; more than 50% of the visit was spent counseling and/or coordinating care by the attending physician.

## 2022-07-31 NOTE — PROGRESS NOTE ADULT - ASSESSMENT
ASSESSMENT:    82 year old gentlewoman, former smoker, without history of intrinsic lung disease. She has a history of HTN, HLD, DM, CAD s/p PCI (1990s and 2021) and PAD s/p RLE stenting. The patient has been having difficulty ambulating over the last several months due to a sense of imbalance with impaired gait. Her family has noted a change in her personality with pressured speech and insomnia. Outpatient cervical MRI incidentally note a CNS lesion. Lumbar spine MRI performed due to chronic back pain revealed multilevel foraminal stenosis/disc bulges. 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 The patient has no shortness of breath or hypoxemia on room air. She has no cough, sputum production, chest congestion or wheeze. No fevers, chills or sweats. No chest pain/pressure or palpitations.     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    MRI -> right temporal lobe peripherally enhancing, centrally necrotic 3.1 x 2.6 cm mass demonstrating mild diffusion restriction - there is surrounding vasogenic edema - 8 mm leftward midline shift right uncal herniation - additional left parafalcine 1.3 x 0.9 cm rim-enhancing cystic lesion with mild surrounding vasogenic edema - tiny 0.3 x 0.3 cm rim-enhancing lesion within the left frontal cortex - findings are concerning for metastatic disease - the left lateral ventricle is mildly dilated    BP control -> losartan/metoprolol/amlodipine    avoid full dose anticoagulation, ASA or plavix    prophylactic anticonvulsants -> depakote    decadron 3mg q8h po for cerebral edema -> taper continues    analgesics    incentive spirometry     advance diet as tolerated    GI/DVT prophylaxis - protonix/SQ lovenox  endocrinology evaluation noted    insulin regimen being adjusted for steroid induced hyperglycemia  radiation oncology, neurooncology, medical oncology evaluations   cardiology     ASA and plavix are currently on hold in anticipation of neurosurgery - patient had PCI last year    continue lipitor/lopressor/losartan/metoprolol    ECHO - normal LVEF - no significant valvular heart disease  bowel regimen    Will follow with you. Plan of care discussed with the patient and her daughter-in-law by phone and with Crissy Garza and Ebonie. Discharge planning to acute rehab.    Satish Hopkins MD, [facility]  [phone]  Pulmonary Medicine

## 2022-07-31 NOTE — PROGRESS NOTE ADULT - SUBJECTIVE AND OBJECTIVE BOX
Patient is a 82y old  Female who presents with a chief complaint of abnormal imaging (31 Jul 2022 13:00)      Medication:   acetaminophen     Tablet .. 650 milliGRAM(s) Oral every 6 hours PRN  amLODIPine   Tablet 5 milliGRAM(s) Oral daily  atorvastatin 40 milliGRAM(s) Oral at bedtime  bisacodyl 5 milliGRAM(s) Oral daily PRN  dexAMETHasone     Tablet 3 milliGRAM(s) Oral every 8 hours  dextrose 50% Injectable 25 Gram(s) IV Push once  dextrose 50% Injectable 12.5 Gram(s) IV Push once  dextrose 50% Injectable 25 Gram(s) IV Push once  diVALproex  milliGRAM(s) Oral every 8 hours  enoxaparin Injectable 40 milliGRAM(s) SubCutaneous <User Schedule>  gabapentin 200 milliGRAM(s) Oral at bedtime  insulin glargine Injectable (LANTUS) 20 Unit(s) SubCutaneous at bedtime  insulin lispro (ADMELOG) corrective regimen sliding scale   SubCutaneous Before meals and at bedtime  insulin lispro Injectable (ADMELOG) 9 Unit(s) SubCutaneous before dinner  insulin lispro Injectable (ADMELOG) 5 Unit(s) SubCutaneous before lunch  losartan 100 milliGRAM(s) Oral daily  melatonin 5 milliGRAM(s) Oral at bedtime PRN  metoprolol tartrate 25 milliGRAM(s) Oral two times a day  ondansetron Injectable 4 milliGRAM(s) IV Push every 6 hours PRN  oxyCODONE    IR 5 milliGRAM(s) Oral every 4 hours PRN  oxyCODONE    IR 10 milliGRAM(s) Oral every 4 hours PRN  pantoprazole    Tablet 40 milliGRAM(s) Oral before breakfast  polyethylene glycol 3350 17 Gram(s) Oral two times a day  senna 2 Tablet(s) Oral at bedtime      Physical exam    T(C): 36.8 (07-31-22 @ 17:00), Max: 36.8 (07-31-22 @ 11:39)  HR: 69 (07-31-22 @ 17:00) (61 - 72)  BP: 105/56 (07-31-22 @ 17:00) (105/56 - 143/87)  RR: 18 (07-31-22 @ 17:00) (18 - 18)  SpO2: 98% (07-31-22 @ 17:00) (94% - 98%)  Wt(kg): --    resting NAD  Cv s1 S2 RRR  Lungs clear B/L anteriorly    Labs                        12.8   19.00 )-----------( 258      ( 31 Jul 2022 07:34 )             38.5       07-31    141  |  102  |  42<H>  ----------------------------<  97  4.4   |  28  |  0.94    Ca    9.2      31 Jul 2022 07:13            [id number]

## 2022-07-31 NOTE — PROGRESS NOTE ADULT - SUBJECTIVE AND OBJECTIVE BOX
DATE OF SERVICE: 07-31-22 @ 12:11    Patient is a 82y old  Female who presents with a chief complaint of abnormal imaging (31 Jul 2022 10:27)      SUBJECTIVE / OVERNIGHT EVENTS:  No chest pain. No shortness of breath. No complaints. No events overnight.     MEDICATIONS  (STANDING):  amLODIPine   Tablet 5 milliGRAM(s) Oral daily  atorvastatin 40 milliGRAM(s) Oral at bedtime  dexAMETHasone     Tablet 3 milliGRAM(s) Oral every 8 hours  dextrose 50% Injectable 25 Gram(s) IV Push once  dextrose 50% Injectable 12.5 Gram(s) IV Push once  dextrose 50% Injectable 25 Gram(s) IV Push once  diVALproex  milliGRAM(s) Oral every 8 hours  enoxaparin Injectable 40 milliGRAM(s) SubCutaneous <User Schedule>  gabapentin 200 milliGRAM(s) Oral at bedtime  insulin glargine Injectable (LANTUS) 20 Unit(s) SubCutaneous at bedtime  insulin lispro (ADMELOG) corrective regimen sliding scale   SubCutaneous Before meals and at bedtime  insulin lispro Injectable (ADMELOG) 9 Unit(s) SubCutaneous before dinner  insulin lispro Injectable (ADMELOG) 5 Unit(s) SubCutaneous before lunch  losartan 100 milliGRAM(s) Oral daily  metoprolol tartrate 25 milliGRAM(s) Oral two times a day  pantoprazole    Tablet 40 milliGRAM(s) Oral before breakfast  polyethylene glycol 3350 17 Gram(s) Oral two times a day  senna 2 Tablet(s) Oral at bedtime    MEDICATIONS  (PRN):  acetaminophen     Tablet .. 650 milliGRAM(s) Oral every 6 hours PRN Temp greater or equal to 38C (100.4F), Mild Pain (1 - 3)  bisacodyl 5 milliGRAM(s) Oral daily PRN Constipation  melatonin 5 milliGRAM(s) Oral at bedtime PRN Sleep  ondansetron Injectable 4 milliGRAM(s) IV Push every 6 hours PRN Nausea and/or Vomiting  oxyCODONE    IR 5 milliGRAM(s) Oral every 4 hours PRN Moderate Pain (4 - 6)  oxyCODONE    IR 10 milliGRAM(s) Oral every 4 hours PRN Severe Pain (7 - 10)      Vital Signs Last 24 Hrs  T(C): 36.8 (31 Jul 2022 11:39), Max: 36.8 (31 Jul 2022 11:39)  T(F): 98.2 (31 Jul 2022 11:39), Max: 98.2 (31 Jul 2022 11:39)  HR: 72 (31 Jul 2022 11:39) (61 - 72)  BP: 122/59 (31 Jul 2022 11:39) (108/69 - 143/87)  BP(mean): --  RR: 18 (31 Jul 2022 11:39) (18 - 18)  SpO2: 95% (31 Jul 2022 11:39) (94% - 97%)    Parameters below as of 31 Jul 2022 04:35  Patient On (Oxygen Delivery Method): room air      CAPILLARY BLOOD GLUCOSE      POCT Blood Glucose.: 124 mg/dL (31 Jul 2022 12:03)  POCT Blood Glucose.: 91 mg/dL (31 Jul 2022 07:42)  POCT Blood Glucose.: 147 mg/dL (30 Jul 2022 21:37)  POCT Blood Glucose.: 153 mg/dL (30 Jul 2022 16:27)    I&O's Summary    30 Jul 2022 07:01  -  31 Jul 2022 07:00  --------------------------------------------------------  IN: 1140 mL / OUT: 0 mL / NET: 1140 mL        PHYSICAL EXAM:  GENERAL: NAD, well-developed  HEAD:  Atraumatic, Normocephalic  EYES: EOMI, PERRLA, conjunctiva and sclera clear  NECK: Supple, No JVD  CHEST/LUNG: Clear to auscultation bilaterally; No wheeze  HEART: Regular rate and rhythm; No murmurs, rubs, or gallops  ABDOMEN: Soft, Nontender, Nondistended; Bowel sounds present  EXTREMITIES:  2+ Peripheral Pulses, No clubbing, cyanosis, or edema  PSYCH: AAOx3  NEUROLOGY: non-focal  SKIN: No rashes or lesions    LABS:                        12.8   19.00 )-----------( 258      ( 31 Jul 2022 07:34 )             38.5     07-31    141  |  102  |  42<H>  ----------------------------<  97  4.4   |  28  |  0.94    Ca    9.2      31 Jul 2022 07:13                RADIOLOGY & ADDITIONAL TESTS:    Imaging Personally Reviewed:    Consultant(s) Notes Reviewed:      Care Discussed with Consultants/Other Providers:

## 2022-07-31 NOTE — PROGRESS NOTE ADULT - PROBLEM SELECTOR PLAN 1
Expect blood sugars to continue trending down 2/2 steroid taper.  Will decrease Lantus to 20u at bedtime.  Will decrease Admelog to be 9/5/9 units before each meal as well as Admelog correction scale coverage. Will continue monitoring FS and FU.  Suggest to DC on current insulin regimen, FU  by her Endocrinologist dr salas Richardson 2weeks.  Discussed plan with patient, counseled for compliance with consistent low carb diet and exercise as tolerated outpatient.

## 2022-07-31 NOTE — PROGRESS NOTE ADULT - ASSESSMENT
81 y/o female (former smoker) with PMHx of HTN, CAD, HLD, MI, peripheral neuropathy, DM and peripheral artery disease presented to the ED for imaging for her head due to abnormal spine MRI by her orthopedist (Dr. Castro) during workup for her spine for her chronic back problems. The orthopedist noted something in her brain. Otherwise pt has no symptoms, stated that since 04/2022 she has had gait/balance issues. Endorsed that if she sits for a while and gets up, she feels like she is "drunk" and feels off. Denied chest pain, SOB, fevers, chills, numbness/paresthesias, muscular weakness, dizziness, abdominal pain, headaches. (19 Jul 2022 10:06)  POD #4 s/p  R craniotomy for tumor resection, likely metastatic lung cancer     NEURO:  Q4 hr neuro checks   decadron decreased to 3 mg q 8 hr today for vasogenic  cerebral edema  Valproic acid 5000 mg q 12 hr for seizure prophylaxis  analgesic as needed  MRI post-op official report noted  PT/OT/PM&R: acute rehab  [facility] oncology following, will need out patient rx  RT follow up requested pt needs to f/u with Dr Rodriguez once d/c from rehab      PULM:  RA   keep O2 sat > 94  NSCL cancer   incentive spirometer    CV  Hemodynamically stable  HTN: c/w  losartan and  amlodipine   CAD on metoprolol, aspirin/plavix on hold due to recent intra-cranial surgery, to be cleared by NS   C/w lipitor    RENAL:  IVL   Voiding    GI:  Diet: Diabetic diet   GI prophylaxis [] not indicated [x] PPI [] other: while on decadron   Bowel regimen [x] miralax [x] senna [] other:    ENDO:   DM: as per endo   Continue Lantus to 20u at bedtime  continue Admelog 9/5/9 units before meals as well as coverage scale. Will continue monitoring FS and FU, will adjust dose as steroids are tapered/dc.  If blood sugars are stable, suggest DC on current insulin regimen. FU Endo 4 weeks.  Discussed plan with patient, counseled for compliance with consistent low carb diet and exercise as tolerated outpatient.    HEME/ONC:  VTE prophylaxis: [] SCDs [x] chemoprophylaxis    [x] high risk of DVT/PE on admission due to: tumor     ID:  afebrile     Will discuss with Dr Loomis  Spectra link [phone]

## 2022-07-31 NOTE — PROGRESS NOTE ADULT - SUBJECTIVE AND OBJECTIVE BOX
POD#4 s/p  R craniotomy for tumor resection, likely metastatic lung cancer     Overnight event: no acute event    Vital Signs Last 24 Hrs  T(C): 36.4 (31 Jul 2022 04:35), Max: 36.7 (31 Jul 2022 00:32)  T(F): 97.5 (31 Jul 2022 04:35), Max: 98 (31 Jul 2022 00:32)  HR: 61 (31 Jul 2022 04:35) (61 - 72)  BP: 142/70 (31 Jul 2022 04:35) (108/69 - 143/87)  BP(mean): --  RR: 18 (31 Jul 2022 04:35) (18 - 18)  SpO2: 95% (31 Jul 2022 04:35) (94% - 97%)    Parameters below as of 31 Jul 2022 04:35  Patient On (Oxygen Delivery Method): room air        I&O's Detail    30 Jul 2022 07:01  -  31 Jul 2022 07:00  --------------------------------------------------------  IN:    Oral Fluid: 1140 mL  Total IN: 1140 mL    OUT:  Total OUT: 0 mL    Total NET: 1140 mL        I&O's Summary    30 Jul 2022 07:01  -  31 Jul 2022 07:00  --------------------------------------------------------  IN: 1140 mL / OUT: 0 mL / NET: 1140 mL        PHYSICAL EXAM:  General: No Acute Distress     Neurological: Awake, alert oriented to person, place and time, Following Commands, PERRL, EOMI, mild Rt Facial, Speech Fluent, Moving all extremities, Muscle Strength normal in all four extremities, No Drift, Sensation to Light Touch Intact    Pulmonary: Clear to Auscultation, No Rales, No Rhonchi, No Wheezes     Cardiovascular: S1, S2, Regular Rate and Rhythm     Gastrointestinal: Soft, Nontender, Nondistended     Incision: staples in place, C/D/I      LABS:                        12.8   19.00 )-----------( 258      ( 31 Jul 2022 07:34 )             38.5     07-31    141  |  102  |  42<H>  ----------------------------<  97  4.4   |  28  |  0.94    Ca    9.2      31 Jul 2022 07:13              CAPILLARY BLOOD GLUCOSE      POCT Blood Glucose.: 91 mg/dL (31 Jul 2022 07:42)  POCT Blood Glucose.: 147 mg/dL (30 Jul 2022 21:37)  POCT Blood Glucose.: 153 mg/dL (30 Jul 2022 16:27)  POCT Blood Glucose.: 238 mg/dL (30 Jul 2022 11:29)      MEDICATIONS:  Antibiotics:    Neuro:  acetaminophen     Tablet .. 650 milliGRAM(s) Oral every 6 hours PRN  diVALproex  milliGRAM(s) Oral every 8 hours  gabapentin 200 milliGRAM(s) Oral at bedtime  melatonin 5 milliGRAM(s) Oral at bedtime PRN  ondansetron Injectable 4 milliGRAM(s) IV Push every 6 hours PRN  oxyCODONE    IR 5 milliGRAM(s) Oral every 4 hours PRN  oxyCODONE    IR 10 milliGRAM(s) Oral every 4 hours PRN    Anticoagulation:  enoxaparin Injectable 40 milliGRAM(s) SubCutaneous <User Schedule>    OTHER:  amLODIPine   Tablet 5 milliGRAM(s) Oral daily  atorvastatin 40 milliGRAM(s) Oral at bedtime  bisacodyl 5 milliGRAM(s) Oral daily PRN  dexAMETHasone     Tablet 3 milliGRAM(s) Oral every 8 hours  dextrose 50% Injectable 12.5 Gram(s) IV Push once  dextrose 50% Injectable 25 Gram(s) IV Push once  dextrose 50% Injectable 25 Gram(s) IV Push once  insulin glargine Injectable (LANTUS) 20 Unit(s) SubCutaneous at bedtime  insulin lispro (ADMELOG) corrective regimen sliding scale   SubCutaneous Before meals and at bedtime  insulin lispro Injectable (ADMELOG) 9 Unit(s) SubCutaneous before dinner  insulin lispro Injectable (ADMELOG) 5 Unit(s) SubCutaneous before lunch  losartan 100 milliGRAM(s) Oral daily  metoprolol tartrate 25 milliGRAM(s) Oral two times a day  pantoprazole    Tablet 40 milliGRAM(s) Oral before breakfast  polyethylene glycol 3350 17 Gram(s) Oral two times a day  senna 2 Tablet(s) Oral at bedtime    IVF:    CULTURES:  Culture Results:   No growth at 1 week. (07-20 @ 16:52)  Culture Results:   Normal Respiratory Tamy present (07-20 @ 16:52)

## 2022-07-31 NOTE — PROGRESS NOTE ADULT - SUBJECTIVE AND OBJECTIVE BOX
CARDIOLOGY FOLLOW UP NOTE - DR. REDDING    Patient Name: LANIE BETRRAND  Date of Service: 07-31-22     no new events    Subjective:    cv: denies chest pain, dyspnea, palpitations, dizziness  pulmonary: denies cough  GI: denies abdominal pain, nausea, vomiting  vascular/legs: no edema   skin: no rash  ROS: otherwise negative   overnight events:      PHYSICAL EXAM:  T(C): 36.8 (07-31-22 @ 11:39), Max: 36.8 (07-31-22 @ 11:39)  HR: 72 (07-31-22 @ 11:39) (61 - 72)  BP: 122/59 (07-31-22 @ 11:39) (108/69 - 143/87)  RR: 18 (07-31-22 @ 11:39) (18 - 18)  SpO2: 95% (07-31-22 @ 11:39) (94% - 97%)  Wt(kg): --  I&O's Summary    30 Jul 2022 07:01  -  31 Jul 2022 07:00  --------------------------------------------------------  IN: 1140 mL / OUT: 0 mL / NET: 1140 mL      Daily     Daily     Appearance: Normal	  Cardiovascular: Normal S1 S2,RRR, No JVD, No murmurs  Respiratory: Lungs clear to auscultation	  Gastrointestinal:  Soft, Non-tender, + BS	  Extremities: Normal range of motion, No clubbing, cyanosis or edema      Home Medications:  aspirin 81 mg oral tablet: 1 tab(s) orally once a day (19 Jul 2022 14:45)  gabapentin 100 mg oral capsule: 2 cap(s) orally once a day (at bedtime) (19 Jul 2022 14:45)  glimepiride 4 mg oral tablet: 1 tab(s) orally 2 times a day (19 Jul 2022 14:45)  losartan-hydrochlorothiazide 100 mg-25 mg oral tablet: 1 tab(s) orally once a day (19 Jul 2022 14:45)  metFORMIN 1000 mg oral tablet: 1 tab(s) orally 2 times a day   (19 Jul 2022 14:45)  metoprolol tartrate 50 mg oral tablet: 1 tab(s) orally 3 times a day (19 Jul 2022 14:45)  simvastatin 40 mg oral tablet: 1 tab(s) orally once a day (at bedtime) (19 Jul 2022 14:45)  Aydin Marsh SoloStar 300 units/mL subcutaneous solution: 6 unit(s) subcutaneous 2 times a day as directed (19 Jul 2022 14:45)  Victoza 18 mg/3 mL subcutaneous solution: 0.6 milligram(s) subcutaneous once a day (19 Jul 2022 14:45)      MEDICATIONS  (STANDING):  amLODIPine   Tablet 5 milliGRAM(s) Oral daily  atorvastatin 40 milliGRAM(s) Oral at bedtime  dexAMETHasone     Tablet 3 milliGRAM(s) Oral every 8 hours  dextrose 50% Injectable 25 Gram(s) IV Push once  dextrose 50% Injectable 12.5 Gram(s) IV Push once  dextrose 50% Injectable 25 Gram(s) IV Push once  diVALproex  milliGRAM(s) Oral every 8 hours  enoxaparin Injectable 40 milliGRAM(s) SubCutaneous <User Schedule>  gabapentin 200 milliGRAM(s) Oral at bedtime  insulin glargine Injectable (LANTUS) 20 Unit(s) SubCutaneous at bedtime  insulin lispro (ADMELOG) corrective regimen sliding scale   SubCutaneous Before meals and at bedtime  insulin lispro Injectable (ADMELOG) 9 Unit(s) SubCutaneous before dinner  insulin lispro Injectable (ADMELOG) 5 Unit(s) SubCutaneous before lunch  losartan 100 milliGRAM(s) Oral daily  metoprolol tartrate 25 milliGRAM(s) Oral two times a day  pantoprazole    Tablet 40 milliGRAM(s) Oral before breakfast  polyethylene glycol 3350 17 Gram(s) Oral two times a day  senna 2 Tablet(s) Oral at bedtime      TELEMETRY: 	    ECG:  	  RADIOLOGY:   DIAGNOSTIC TESTING:  [ ] Echocardiogram:  [ ] Catheterization:  [ ] Stress Test:    OTHER: 	    LABS:	 	    CARDIAC MARKERS:                                      12.8   19.00 )-----------( 258      ( 31 Jul 2022 07:34 )             38.5     07-31    141  |  102  |  42<H>  ----------------------------<  97  4.4   |  28  |  0.94    Ca    9.2      31 Jul 2022 07:13      proBNP:     Lipid Profile:   HgA1c:     Creatinine, Serum: 0.94 mg/dL (07-31-22 @ 07:13)  Creatinine, Serum: 0.85 mg/dL (07-30-22 @ 06:07)  Creatinine, Serum: 1.15 mg/dL (07-28-22 @ 20:44)

## 2022-07-31 NOTE — PROGRESS NOTE ADULT - SUBJECTIVE AND OBJECTIVE BOX
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; ataxia; frontal lobe syndrome    INTERVAL HISTORY: awake and alert on the neurology lance; much less pressured speech following right craniotomy for tumor resection; remains in bed today due to ongoing ataxia; EEG -> evidence for right hemispheric (right temporal maximal) cortical/subcortical structural lesion - there were no epileptiform abnormalities or seizures recorded; s/p bronchoscopy with endobronchial brushings/biopsies of an obstructing left upper lobe endobronchial lesion -> 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; no shortness of breath or hypoxemia on room air; no cough, sputum production, hemoptysis, chest congestion or wheeze; no fevers, chills or sweats; no chest pain/pressure or palpitations;     REVIEW OF SYSTEMS:  Constitutional: As per interval history  HEENT: Within normal limits  CV: As per interval history  Resp: As per interval history  GI: Within normal limits   : Within normal limits  Musculoskeletal: Within normal limits  Skin: Within normal limits  Neurological: ataxia - pressured speech -> improved  Psychiatric: Within normal limits  Endocrine: Within normal limits  Hematologic/Lymphatic: lung cancer  Allergic/Immunologic: Within normal limits    MEDICATIONS:     Cardiovascular:  amLODIPine   Tablet 5 milliGRAM(s) Oral daily  losartan 100 milliGRAM(s) Oral daily  metoprolol tartrate 25 milliGRAM(s) Oral two times a day    Other:  atorvastatin 40 milliGRAM(s) Oral at bedtime  dexAMETHasone     Tablet 3 milliGRAM(s) Oral every 8 hours  dextrose 50% Injectable 12.5 Gram(s) IV Push once  dextrose 50% Injectable 25 Gram(s) IV Push once  dextrose 50% Injectable 25 Gram(s) IV Push once  diVALproex  milliGRAM(s) Oral every 8 hours  enoxaparin Injectable 40 milliGRAM(s) SubCutaneous <User Schedule>  gabapentin 200 milliGRAM(s) Oral at bedtime  insulin glargine Injectable (LANTUS) 20 Unit(s) SubCutaneous at bedtime  insulin lispro (ADMELOG) corrective regimen sliding scale   SubCutaneous Before meals and at bedtime  insulin lispro Injectable (ADMELOG) 9 Unit(s) SubCutaneous before dinner  insulin lispro Injectable (ADMELOG) 5 Unit(s) SubCutaneous before lunch  pantoprazole    Tablet 40 milliGRAM(s) Oral before breakfast  polyethylene glycol 3350 17 Gram(s) Oral two times a day  senna 2 Tablet(s) Oral at bedtime    MEDICATIONS  (PRN):  acetaminophen     Tablet .. 650 milliGRAM(s) Oral every 6 hours PRN Temp greater or equal to 38C (100.4F), Mild Pain (1 - 3)  bisacodyl 5 milliGRAM(s) Oral daily PRN Constipation  melatonin 5 milliGRAM(s) Oral at bedtime PRN Sleep  ondansetron Injectable 4 milliGRAM(s) IV Push every 6 hours PRN Nausea and/or Vomiting  oxyCODONE    IR 5 milliGRAM(s) Oral every 4 hours PRN Moderate Pain (4 - 6)  oxyCODONE    IR 10 milliGRAM(s) Oral every 4 hours PRN Severe Pain (7 - 10)    OBJECTIVE:    POCT Blood Glucose.: 124 mg/dL (31 Jul 2022 12:03)  POCT Blood Glucose.: 91 mg/dL (31 Jul 2022 07:42)  POCT Blood Glucose.: 147 mg/dL (30 Jul 2022 21:37)  POCT Blood Glucose.: 153 mg/dL (30 Jul 2022 16:27)    PHYSICAL EXAM:       ICU Vital Signs Last 24 Hrs  T(C): 36.8 (31 Jul 2022 11:39), Max: 36.8 (31 Jul 2022 11:39)  T(F): 98.2 (31 Jul 2022 11:39), Max: 98.2 (31 Jul 2022 11:39)  HR: 72 (31 Jul 2022 11:39) (61 - 72)  BP: 122/59 (31 Jul 2022 11:39) (108/69 - 143/87)  BP(mean): --  ABP: --  ABP(mean): --  RR: 18 (31 Jul 2022 11:39) (18 - 18)  SpO2: 95% (31 Jul 2022 11:39) (94% - 97%) on room air    General: Awake. Alert. Cooperative. No distress. Appears stated age. Less pressured speech.	  HEENT:  Right craniotomy incision stapled. Normocephalic. Anicteric. Normal oral mucosa. PERRL. EOMI.  Neck: Supple. Trachea midline. Thyroid without enlargement/tenderness/nodules. No carotid bruit. No JVD.	  Cardiovascular: Regular rate and rhythm. S1 S2 normal. No murmurs, rubs or gallops.  Respiratory: Respirations unlabored. Clear to auscultation and percussion bilaterally. No curvature.  Abdomen: Soft. Non-tender. Non-distended. No organomegaly. No masses. Normal bowel sounds.  Extremities: Warm to touch. No clubbing or cyanosis. No pedal edema. Bilateral SCDs  Pulses: 2+ peripheral pulses all extremities.	  Skin: Craniotomy scar C/D/I. Abrasion on the forehead at the site of the EEG dressing  Lymph Nodes: Cervical, supraclavicular and axillary nodes normal  Neurological: Improved gait. A and O x 3  Psychiatry: Appropriate mood and affect.    LABS:                          12.8   19.00 )-----------( 258      ( 31 Jul 2022 07:34 )             38.5     CBC    WBC  19.00 <==, 21.01 <==, 23.88 <==, 21.83 <==, 15.37 <==, 16.53 <==    Hemoglobin  12.8 <<==, 12.4 <<==, 12.2 <<==, 13.4 <<==, 13.1 <<==, 13.3 <<==    Hematocrit  38.5 <==, 37.1 <==, 36.3 <==, 39.2 <==, 38.8 <==, 40.4 <==    Platelets  258 <==, 235 <==, 254 <==, 342 <==, 309 <==, 329 <==      141  |  102  |  42<H>  ----------------------------<  97    07-31  4.4   |  28  |  0.94      LYTES    sodium  141 <==, 139 <==, 140 <==, 138 <==, 142 <==, 144 <==, 141 <==    potassium   4.4 <==, 4.6 <==, 4.5 <==, 4.1 <==, 4.1 <==, 3.4 <==, 4.2 <==    chloride  102 <==, 102 <==, 104 <==, 103 <==, 110 <==, 105 <==, 107 <==    carbon dioxide  28 <==, 26 <==, 25 <==, 18 <==, 20 <==, 24 <==, 23 <==    =============================================================================================  RENAL FUNCTION:    Creatinine:   0.94  <<==, 0.85  <<==, 1.15  <<==, 0.93  <<==, 0.93  <<==, 1.18  <<==, 1.06  <<==    BUN:   42 <==, 34 <==, 31 <==, 38 <==, 43 <==, 56 <==, 52 <==    ============================================================================================    calcium   9.2 <==, 9.9 <==, 9.1 <==, 9.1 <==, 9.1 <==, 9.5 <==, 8.8 <==    phos   4.2 <==, 4.2 <==    mag   1.9 <==, 1.8 <==    ============================================================================================  LFTs    AST:   14 <==     ALT:  21  <==     AP:  76  <=    Bili:  0.2  <=      PT/INR - ( 27 Jul 2022 07:17 )   PT: 11.6 sec;   INR: 1.00 ratio      PT/INR - ( 27 Jul 2022 07:17 )   PT: 11.6 sec;   INR: 1.00 ratio      ABG - ( 27 Jul 2022 13:13 )  pH: 7.39  /  pCO2: 32    /  pO2: 399   / HCO3: 19    / Base Excess: -4.7  /  SaO2: 100.0     < from: TTE with Doppler (w/Cont) (07.22.22 @ 10:09) >    Patient name: LANIE BERTRAND  YOB: 1940   Age: 82 (F)   MR#: [id number]  Study Date: 7/22/2022  Location: [address] [place], [place] [id number]: Yelena Louis RDCS  Study quality: Technically difficult  Referring Physician: Mk Garza MD  BloodPressure: 116/72 mmHg  Height: 165 cm  Weight: 73 kg  BSA: 1.8 m2  ------------------------------------------------------------------------  PROCEDURE: Transthoracic echocardiogram with 2-D, M-Mode  and complete spectral and color flow Doppler.  INDICATION: Encounter for other preprocedural examination  (Z01.818)  ------------------------------------------------------------------------  Dimensions:    Normal Values:  LA:     3.4    2.0 - 4.0 cm  Ao:     3.1    2.0 - 3.8 cm  SEPTUM: 1.0    0.6 - 1.2 cm  PWT:    0.8    0.6 - 1.1 cm  LVIDd:  4.8    3.0 - 5.6 cm  LVIDs:  3.7    1.8 - 4.0 cm  Derived variables:  LVMI: 82 g/m2  RWT: 0.33  EF (Visual Estimate): 55 %  ------------------------------------------------------------------------  Observations:  Mitral Valve: Mitral annular calcification.  Aortic Valve/Aorta: Normal aortic valve.  Normal aortic root size.  Left Atrium: Normal left atrium.  Left Ventricle: Endocardial visualization enhanced with  intravenous injection of Ultrasonic Enhancing Agent  (Lumason).  Normal left ventricular internal dimensions with discrete  upper septal hypertrophy.  Estimated ejection fraction 55%, The inferolateral wall is  akinetic.  Impaired LV-relaxation with normal filling pressure.  Right Heart: Normal right atrium. Normal right ventricular  size and function.  Normal tricuspid valve.  Normal pulmonic valve. Mild pulmonic regurgitation.  Pericardium/Pleura: Normal pericardium with no pericardial  effusion.  Hemodynamic: Estimated right atrial pressure is normal.  No evidence of pulmonary hypertension.  ------------------------------------------------------------------------  Conclusions:  Endocardial visualization enhanced with intravenous  injection of Ultrasonic Enhancing Agent (Lumason).  Normal left ventricular internal dimensions with discrete  upper septal hypertrophy.  Estimated ejection fraction 55%, The inferolateral wall is  akinetic.  ------------------------------------------------------------------------  Confirmed on  7/22/2022 - 14:42:07 by David Vicente MD, FASE  ------------------------------------------------------------------------  ---------------------------------------------------------------------------------------------------------------  Cytopathology - Non Gyn Report (07.20.22 @ 16:10)   Cytopathology - Non Gyn Report:   ACCESSION No: [id number]   Patient: LANIE BERTRAND   Final Diagnosis   1. LUNG, LEFT UPPER LOBE, BRONCHOALVEOLAR LAVAGE   NEGATIVE FOR MALIGNANT CELLS.   Cytology slide and cell block revel ciliated bronchial cells.   2. LUNG, LEFT, ENDOBRONCHIAL BRUSH   POSITIVE FOR MALIGNANT CELLS.   Non small cell carcinoma favor adenocarcinoma   ---------------------------------------------------------------------------------------------------------------  Surgical Pathology Report (07.20.22 @ 11:53)   Surgical Pathology Report:   1. Left mainstem bronchus biopsy   Final Diagnosis   Bronchus, mainstem, left, biopsy:   - Poorly differentiated adenocarcinoma of lung origin, see comment.   Comment: Immunohistochemical stains: The neoplastic cells are positive   for CAM 5.2 and TTF-1 while negative for P40, CK20, chromogranin,   synaptophysin and CD56. Ki67 reveals a proliferative index of 40%.   In summary the morphology and immunophenotype are consistent with poorly   differentiated adenocarcinoma of lung origin.   Dr. Hopkins was notified of the diagnosis on 07/26/2022.   ---------------------------------------------------------------------------------------------------------------  MICROBIOLOGY:   COVID-19 PCR . (07.18.22 @ 23:25)   COVID-19 PCR: [facility]    Culture - Bronchial (07.20.22 @ 16:52)   Culture Results:   Normal Respiratory Tamy present     Culture - Acid Fast - Bronchial w/Smear (07.20.22 @ 16:52)   Specimen Source: .Bronchial Bronchial Lavage   Acid Fast Bacilli Smear:   No acid fast bacilli seen by fluorochrome stain     RADIOLOGY:  [x] Chest radiographs reviewed and interpreted by me    EXAM:  MR BRAIN WAW IC                          PROCEDURE DATE:  07/29/2022      IMPRESSION: Redemonstration of right-sided craniotomy changes status post   resection of a right temporal lobe mass with expected postoperative   changes. Residual hemorrhage and/or postoperative material within the  postoperative bed limits evaluation for enhancement.    Unchanged appearing ring-enhancing lesion in the left parafalcine   location for which a residual metastatic lesion is a possibility.    KATIE BARRERA MD; AttendingRadiologist  This document has been electronically signed. Jul 29 2022 12:24PM  ---------------------------------------------------------------------------------------------------------------     EXAM:  CT BRAIN                          PROCEDURE DATE:  07/28/2022      INTERPRETATION:  CT head without IV contrast    IMPRESSION:   RIGHT frontotemporal craniotomy with surgical cavity and   RIGHT temporal lobe. Mild pneumocephalus. Moderate residual edema is   noted but improved. There is improved effacement of RIGHT lateral   ventricle with 8 mm subfalcine herniation to the LEFT, improved from   prior.    ADRYAN BROWN MD; Attending Radiologist  This document has been electronically signed. Jul 28 2022 10:13AM  ---------------------------------------------------------------------------------------------------------------  EXAM:  CT ABDOMEN AND PELVIS IC                        EXAM:  CT CHEST IC                          PROCEDURE DATE:  07/18/2022      FINDINGS:  CHEST:  LUNGS AND LARGE AIRWAYS: There is a heterogeneous left upper lobe mass   approximately measuring 3.3 x 2.6 cm (2, 28) obstructing the left apical   posterior bronchus with partial atelectasis of the left upper lobe. Few   scattered bilateral pulmonary nodules measuring up to 3 mm in the right   upper lobe (2, 39).  PLEURA: No pleural effusion.  VESSELS: Atherosclerotic changes of the aorta and coronary arteries.  HEART: Heart size is normal. No pericardial effusion.  MEDIASTINUM AND SIXTO: Soft tissue contiguous with mass versus adenopathy   measuring 1.7 x 1.5 cm (2, 33).  CHEST WALL AND LOWER NECK: Bilateral subcentimeter hypoattenuating   thyroid nodules.    ABDOMEN AND PELVIS:  LIVER: Within normal limits.  BILE DUCTS: Normal caliber.  GALLBLADDER: Within normal limits.  SPLEEN: Within normal limits.  PANCREAS: Within normal limits.  ADRENALS: Indeterminant left adrenal nodule measuring 1.2 cm.  KIDNEYS/URETERS: No hydronephrosis. Renal cysts and subcentimeter   hypoattenuating foci bilaterally, too small to characterize.    BLADDER: Within normal limits.  REPRODUCTIVE ORGANS: Uterus and adnexa within normal limits.    BOWEL: Colonic diverticula withoutevidence of acute diverticulitis. No   bowel obstruction. Appendix is normal.  PERITONEUM: No ascites.  VESSELS: Atherosclerotic changes.  RETROPERITONEUM/LYMPH NODES: No lymphadenopathy.  ABDOMINAL WALL: Within normal limits.  BONES: Degenerative changes. Grade 1 anterolisthesis of L5 on S1. Status   post right shoulder arthroplasty.    IMPRESSION:  Left upper lobe lung mass with partial atelectasis of the left upper   lobe, concerning for primary lung neoplasm. Few scattered sub-4 mm   pulmonary nodules, indeterminate.    Indeterminant left adrenal nodule for which contrast enhanced MRI is   recommended for further evaluation.    DEEPAK HARRINGTON MD; Resident Radiologist  This document has been electronically signed.  HAYLEY DENTON MD; Attending Radiologist  This document has been electronically signed. Jul 19 2022  9:02AM  ---------------------------------------------------------------------------------------------------------------  EXAM:  CT BRAIN                          PROCEDURE DATE:  07/18/2022      FINDINGS:  A cystic versus centrally necrotic mass in the right temporal lobe   measures approximately 4.8 x 3.2 x 3.2 cm (602:15 and 601:33).    There moderate tolarge amount of vasogenic edema in the right frontal,   parietal and temporal lobes.  There is regional mass effect in the right   cerebral hemisphere with partial effacement of the right lateral   ventricle and third ventricle.  There is medial bulging of the right   thalamus across the midline by up to 1.4 cm (2:17).  There is   approximately 5 mm subfalcine subfalcine herniation of the right frontal   lobe underneath the cerebral falx (2:21).    There is an approximately 1.4 x 1.3 x 1.6 cm cystic versus centimeter   necrotic mass in the medial left frontal lobe.  There appears to be trace   edema surrounding this lesion, without significant mass effect.    There is no evidence of acute intracranial hemorrhage, transtentorial   herniation or acute territorial infarct.  Mild prominence the left   lateral ventricle may be related to pre-existing cerebral volume loss   rather than hydrocephalus.    The paranasal sinuses and mastoids are grossly clear.    The patient is status post cataractlens placement surgery bilaterally.    The calvarium and skull base appear within normal limits.    IMPRESSION:  Cystic versus a necrotic masses measure approximately 4.8 x 3.2 x 3.2 cm   in the right temporal lobe and 1.4 x 1.3 x 1.6 cm in the medial left   frontal lobe.    Moderate to large amount of vasogenic edema in the right frontal,   parietal and temporal lobes.  Trace edema in the left frontal lobe.    Regional mass effect in the right cerebral hemisphere with partial   effacement of theventricular system and 5 mm subfalcine herniation of   the right frontal lobe underneath the cerebral falx.    The findings may represent intracranial metastatic disease versus   glioblastoma.  Contrast-enhanced brain MRI is recommended for further   evaluation.      Findings discussed with DAVID Albarran by Dr. Harrington at 2153 hours on   7/8/2022 with readback confirmation.    DEEPAK HARRINGTON MD; Resident Radiologist  This document has been electronically signed.  PHILLY CISSE MD; Attending Radiologist  This document has been electronically signed. Jul 18 2022 10:15PM  ---------------------------------------------------------------------------------------------------------------  EXAM:  MR BRAIN WAW IC                          PROCEDURE DATE:  07/20/2022      FINDINGS:    Right temporal lobe peripherally enhancing, centrally necrotic 3.1 x 2.6   cm mass demonstrating mild diffusion restriction. There is surrounding   vasogenic edema. 8 mm leftward midline shift. Right uncal herniation.   Additional left parafalcine 1.3 x 0.9 cm rim-enhancing cystic lesion with   mild surrounding vasogenic edema. Tiny 0.3 x 0.3 cm rim-enhancing lesion   within the left frontal cortex (10:125).    Scattered foci of T2/FLAIR hyperintensity in the bilateral hemispheric   white matter are nonspecific but likely related to chronic white matter   microvascular ischemic disease. The left lateral ventricle is mildly   dilated. Flow voids of the major intracranial vessels at the skull base   follow expected course and contour.    The paranasal sinuses demonstrate no signal abnormality. The mastoids   demonstrate no signal abnormality.  Status post bilateral intraocular   lens implants.      IMPRESSION:  Right temporal lobe peripherally enhancing, centrally necrotic 3.1 x 2.6   cm mass demonstrating mild diffusion restriction. There is surrounding   vasogenic edema. 8 mm leftward midline shift. Right uncal herniation.   Additional left parafalcine 1.3 x 0.9 cm rim-enhancing cystic lesion with   mild surrounding vasogenic edema. Tiny 0.3 x 0.3 cm rim-enhancing lesion   within the left frontal cortex. Findings are concerning for metastatic   disease in the context of patient's known primary lung cancer. The left   lateral ventricle is mildly dilated.    NEIL DIOP MD; Resident Radiologist  This document has been electronically signed.  NERI BARRERA MD; Attending Radiologist  This document has been electronically signed. Jul 21 2022  4:26PM  ---------------------------------------------------------------------------------------------------------------  EXAM:  DUPLEX SCAN EXT VEINS LOWER BI                          PROCEDURE DATE:  07/28/2022      IMPRESSION:  No evidence of deep venous thrombosis in either lower extremity.    YENIFER QUAN MD; Attending Radiologist  This document has been electronically signed. Jul 28 2022  2:08PM  ---------------------------------------------------------------------------------------------------------------

## 2022-07-31 NOTE — PROGRESS NOTE ADULT - ASSESSMENT
Stage IV NSCLC with brain metastases s/p NS for resection of brain mass. Foundation testing to be done as out-pt to decide best option for systemic therapy. Outpt RT as well.    leucocytosis - getting steroids.

## 2022-07-31 NOTE — PROGRESS NOTE ADULT - ASSESSMENT
Assessment  DMT2: 82y Female with DM T2 with hyperglycemia, A1C 8.4%, was on oral meds and insulin at home, now on basal bolus insulin, blood sugars in acceptable range/trending down, no hypoglycemias, eating meals, dexamethasone dose decreased, DC planning for rehab.  Brain Mass: postop, on meds, monitored, FU Neurosurgery.  Lung Mass: s/p bronchoscopy, CA workup in progress, monitored.  Thyroid Nodules: seen on imaging, B/L subcentimeter thyroid nodules, she denies neck mass/pain/dysphagia, euthyroid, reports known history and is being followed by Dr. Richardson.  Adrenal Nodule: seen on imaging, 1.2 cm left thyroid nodule, unknown history. AM cortisol suppressed 2/2 current steroid use, aldosterone and metanephrine labs in normal range.        Troy Morelia ESPINOZA  [phone]

## 2022-08-01 ENCOUNTER — TRANSCRIPTION ENCOUNTER (OUTPATIENT)
Age: 82
End: 2022-08-01

## 2022-08-01 LAB
ALBUMIN SERPL ELPH-MCNC: 3.4 G/DL — SIGNIFICANT CHANGE UP (ref 3.3–5)
ALP SERPL-CCNC: 75 U/L — SIGNIFICANT CHANGE UP (ref 40–120)
ALT FLD-CCNC: 16 U/L — SIGNIFICANT CHANGE UP (ref 10–45)
ANION GAP SERPL CALC-SCNC: 17 MMOL/L — SIGNIFICANT CHANGE UP (ref 5–17)
APTT BLD: 50 SEC — HIGH (ref 27.5–35.5)
APTT BLD: >200 SEC — CRITICAL HIGH (ref 27.5–35.5)
AST SERPL-CCNC: 25 U/L — SIGNIFICANT CHANGE UP (ref 10–40)
BASOPHILS # BLD AUTO: 0 K/UL — SIGNIFICANT CHANGE UP (ref 0–0.2)
BASOPHILS NFR BLD AUTO: 0 % — SIGNIFICANT CHANGE UP (ref 0–2)
BILIRUB SERPL-MCNC: 0.2 MG/DL — SIGNIFICANT CHANGE UP (ref 0.2–1.2)
BLD GP AB SCN SERPL QL: NEGATIVE — SIGNIFICANT CHANGE UP
BUN SERPL-MCNC: 55 MG/DL — HIGH (ref 7–23)
CALCIUM SERPL-MCNC: 9 MG/DL — SIGNIFICANT CHANGE UP (ref 8.4–10.5)
CHLORIDE SERPL-SCNC: 94 MMOL/L — LOW (ref 96–108)
CK MB BLD-MCNC: 11.5 % — HIGH (ref 0–3.5)
CK MB BLD-MCNC: 2.6 % — SIGNIFICANT CHANGE UP (ref 0–3.5)
CK MB CFR SERPL CALC: 19.7 NG/ML — HIGH (ref 0–3.8)
CK MB CFR SERPL CALC: 2 NG/ML — SIGNIFICANT CHANGE UP (ref 0–3.8)
CK SERPL-CCNC: 171 U/L — HIGH (ref 25–170)
CK SERPL-CCNC: 78 U/L — SIGNIFICANT CHANGE UP (ref 25–170)
CO2 SERPL-SCNC: 25 MMOL/L — SIGNIFICANT CHANGE UP (ref 22–31)
CREAT SERPL-MCNC: 1.24 MG/DL — SIGNIFICANT CHANGE UP (ref 0.5–1.3)
EGFR: 43 ML/MIN/1.73M2 — LOW
EOSINOPHIL # BLD AUTO: 0 K/UL — SIGNIFICANT CHANGE UP (ref 0–0.5)
EOSINOPHIL NFR BLD AUTO: 0 % — SIGNIFICANT CHANGE UP (ref 0–6)
GLUCOSE BLDC GLUCOMTR-MCNC: 112 MG/DL — HIGH (ref 70–99)
GLUCOSE BLDC GLUCOMTR-MCNC: 187 MG/DL — HIGH (ref 70–99)
GLUCOSE BLDC GLUCOMTR-MCNC: 218 MG/DL — HIGH (ref 70–99)
GLUCOSE BLDC GLUCOMTR-MCNC: 261 MG/DL — HIGH (ref 70–99)
GLUCOSE SERPL-MCNC: 307 MG/DL — HIGH (ref 70–99)
HCT VFR BLD CALC: 45.4 % — HIGH (ref 34.5–45)
HGB BLD-MCNC: 15.2 G/DL — SIGNIFICANT CHANGE UP (ref 11.5–15.5)
INR BLD: 0.91 RATIO — SIGNIFICANT CHANGE UP (ref 0.88–1.16)
INR BLD: 0.93 RATIO — SIGNIFICANT CHANGE UP (ref 0.88–1.16)
INR BLD: 6.09 RATIO — CRITICAL HIGH (ref 0.88–1.16)
LACTATE SERPL-SCNC: 2.7 MMOL/L — HIGH (ref 0.7–2)
LYMPHOCYTES # BLD AUTO: 1.9 K/UL — SIGNIFICANT CHANGE UP (ref 1–3.3)
LYMPHOCYTES # BLD AUTO: 7.6 % — LOW (ref 13–44)
MAGNESIUM SERPL-MCNC: 1.6 MG/DL — SIGNIFICANT CHANGE UP (ref 1.6–2.6)
MANUAL SMEAR VERIFICATION: SIGNIFICANT CHANGE UP
MCHC RBC-ENTMCNC: 31.1 PG — SIGNIFICANT CHANGE UP (ref 27–34)
MCHC RBC-ENTMCNC: 33.5 GM/DL — SIGNIFICANT CHANGE UP (ref 32–36)
MCV RBC AUTO: 92.8 FL — SIGNIFICANT CHANGE UP (ref 80–100)
MONOCYTES # BLD AUTO: 0.85 K/UL — SIGNIFICANT CHANGE UP (ref 0–0.9)
MONOCYTES NFR BLD AUTO: 3.4 % — SIGNIFICANT CHANGE UP (ref 2–14)
NEUTROPHILS # BLD AUTO: 22.27 K/UL — HIGH (ref 1.8–7.4)
NEUTROPHILS NFR BLD AUTO: 89 % — HIGH (ref 43–77)
NT-PROBNP SERPL-SCNC: 1286 PG/ML — HIGH (ref 0–300)
PHOSPHATE SERPL-MCNC: 3.4 MG/DL — SIGNIFICANT CHANGE UP (ref 2.5–4.5)
PLAT MORPH BLD: NORMAL — SIGNIFICANT CHANGE UP
PLATELET # BLD AUTO: 336 K/UL — SIGNIFICANT CHANGE UP (ref 150–400)
POTASSIUM SERPL-MCNC: 4.2 MMOL/L — SIGNIFICANT CHANGE UP (ref 3.5–5.3)
POTASSIUM SERPL-SCNC: 4.2 MMOL/L — SIGNIFICANT CHANGE UP (ref 3.5–5.3)
PROT SERPL-MCNC: 6.5 G/DL — SIGNIFICANT CHANGE UP (ref 6–8.3)
PROTHROM AB SERPL-ACNC: 10.5 SEC — SIGNIFICANT CHANGE UP (ref 10.5–13.4)
PROTHROM AB SERPL-ACNC: 10.8 SEC — SIGNIFICANT CHANGE UP (ref 10.5–13.4)
PROTHROM AB SERPL-ACNC: 72 SEC — HIGH (ref 10.5–13.4)
RBC # BLD: 4.89 M/UL — SIGNIFICANT CHANGE UP (ref 3.8–5.2)
RBC # FLD: 12.2 % — SIGNIFICANT CHANGE UP (ref 10.3–14.5)
RBC BLD AUTO: SIGNIFICANT CHANGE UP
RH IG SCN BLD-IMP: POSITIVE — SIGNIFICANT CHANGE UP
SODIUM SERPL-SCNC: 136 MMOL/L — SIGNIFICANT CHANGE UP (ref 135–145)
TROPONIN T, HIGH SENSITIVITY RESULT: 22 NG/L — SIGNIFICANT CHANGE UP (ref 0–51)
TROPONIN T, HIGH SENSITIVITY RESULT: 285 NG/L — HIGH (ref 0–51)
WBC # BLD: 25.02 K/UL — HIGH (ref 3.8–10.5)
WBC # FLD AUTO: 25.02 K/UL — HIGH (ref 3.8–10.5)

## 2022-08-01 PROCEDURE — 93010 ELECTROCARDIOGRAM REPORT: CPT

## 2022-08-01 PROCEDURE — 93010 ELECTROCARDIOGRAM REPORT: CPT | Mod: 76

## 2022-08-01 PROCEDURE — 70450 CT HEAD/BRAIN W/O DYE: CPT | Mod: 26

## 2022-08-01 RX ORDER — ATORVASTATIN CALCIUM 80 MG/1
80 TABLET, FILM COATED ORAL AT BEDTIME
Refills: 0 | Status: DISCONTINUED | OUTPATIENT
Start: 2022-08-01 | End: 2022-08-04

## 2022-08-01 RX ORDER — HEPARIN SODIUM 5000 [USP'U]/ML
INJECTION INTRAVENOUS; SUBCUTANEOUS
Qty: 25000 | Refills: 0 | Status: DISCONTINUED | OUTPATIENT
Start: 2022-08-01 | End: 2022-08-01

## 2022-08-01 RX ORDER — NITROGLYCERIN 6.5 MG
0.4 CAPSULE, EXTENDED RELEASE ORAL ONCE
Refills: 0 | Status: COMPLETED | OUTPATIENT
Start: 2022-08-01 | End: 2022-08-01

## 2022-08-01 RX ORDER — INSULIN LISPRO 100/ML
7 VIAL (ML) SUBCUTANEOUS
Refills: 0 | Status: DISCONTINUED | OUTPATIENT
Start: 2022-08-01 | End: 2022-08-04

## 2022-08-01 RX ORDER — HYDRALAZINE HCL 50 MG
10 TABLET ORAL ONCE
Refills: 0 | Status: COMPLETED | OUTPATIENT
Start: 2022-08-01 | End: 2022-08-01

## 2022-08-01 RX ORDER — SODIUM CHLORIDE 9 MG/ML
1000 INJECTION, SOLUTION INTRAVENOUS
Refills: 0 | Status: DISCONTINUED | OUTPATIENT
Start: 2022-08-10 | End: 2022-08-26

## 2022-08-01 RX ORDER — DEXAMETHASONE 0.5 MG/5ML
ELIXIR ORAL
Refills: 0 | Status: COMPLETED | OUTPATIENT
Start: 2022-08-10 | End: 2022-08-18

## 2022-08-01 RX ORDER — INSULIN GLARGINE 100 [IU]/ML
15 INJECTION, SOLUTION SUBCUTANEOUS AT BEDTIME
Refills: 0 | Status: DISCONTINUED | OUTPATIENT
Start: 2022-08-10 | End: 2022-08-12

## 2022-08-01 RX ORDER — INSULIN LISPRO 100/ML
VIAL (ML) SUBCUTANEOUS
Refills: 0 | Status: DISCONTINUED | OUTPATIENT
Start: 2022-08-10 | End: 2022-08-26

## 2022-08-01 RX ORDER — ENOXAPARIN SODIUM 100 MG/ML
40 INJECTION SUBCUTANEOUS
Refills: 0 | Status: DISCONTINUED | OUTPATIENT
Start: 2022-08-10 | End: 2022-08-26

## 2022-08-01 RX ORDER — DEXTROSE 50 % IN WATER 50 %
25 SYRINGE (ML) INTRAVENOUS ONCE
Refills: 0 | Status: DISCONTINUED | OUTPATIENT
Start: 2022-08-10 | End: 2022-08-26

## 2022-08-01 RX ORDER — INSULIN GLARGINE 100 [IU]/ML
6 INJECTION, SOLUTION SUBCUTANEOUS
Qty: 0 | Refills: 0 | DISCHARGE

## 2022-08-01 RX ORDER — INSULIN LISPRO 100/ML
2 VIAL (ML) SUBCUTANEOUS
Qty: 0 | Refills: 0 | DISCHARGE
Start: 2022-08-01

## 2022-08-01 RX ORDER — METOPROLOL TARTRATE 50 MG
1 TABLET ORAL
Qty: 0 | Refills: 0 | DISCHARGE

## 2022-08-01 RX ORDER — METOPROLOL TARTRATE 50 MG
25 TABLET ORAL
Refills: 0 | Status: DISCONTINUED | OUTPATIENT
Start: 2022-08-10 | End: 2022-08-26

## 2022-08-01 RX ORDER — AMLODIPINE BESYLATE 2.5 MG/1
1 TABLET ORAL
Qty: 0 | Refills: 0 | DISCHARGE
Start: 2022-08-01

## 2022-08-01 RX ORDER — INSULIN LISPRO 100/ML
5 VIAL (ML) SUBCUTANEOUS
Refills: 0 | Status: DISCONTINUED | OUTPATIENT
Start: 2022-08-10 | End: 2022-08-13

## 2022-08-01 RX ORDER — ENOXAPARIN SODIUM 100 MG/ML
40 INJECTION SUBCUTANEOUS
Qty: 0 | Refills: 0 | DISCHARGE
Start: 2022-08-01

## 2022-08-01 RX ORDER — ATORVASTATIN CALCIUM 80 MG/1
40 TABLET, FILM COATED ORAL AT BEDTIME
Refills: 0 | Status: DISCONTINUED | OUTPATIENT
Start: 2022-08-10 | End: 2022-08-26

## 2022-08-01 RX ORDER — PANTOPRAZOLE SODIUM 20 MG/1
40 TABLET, DELAYED RELEASE ORAL
Refills: 0 | Status: DISCONTINUED | OUTPATIENT
Start: 2022-08-11 | End: 2022-08-26

## 2022-08-01 RX ORDER — GABAPENTIN 400 MG/1
2 CAPSULE ORAL
Qty: 0 | Refills: 0 | DISCHARGE
Start: 2022-08-01

## 2022-08-01 RX ORDER — ASPIRIN/CALCIUM CARB/MAGNESIUM 324 MG
325 TABLET ORAL ONCE
Refills: 0 | Status: COMPLETED | OUTPATIENT
Start: 2022-08-01 | End: 2022-08-01

## 2022-08-01 RX ORDER — LOSARTAN POTASSIUM 100 MG/1
1 TABLET, FILM COATED ORAL
Qty: 0 | Refills: 0 | DISCHARGE
Start: 2022-08-01

## 2022-08-01 RX ORDER — LANOLIN ALCOHOL/MO/W.PET/CERES
1 CREAM (GRAM) TOPICAL
Qty: 0 | Refills: 0 | DISCHARGE
Start: 2022-08-01

## 2022-08-01 RX ORDER — LANOLIN ALCOHOL/MO/W.PET/CERES
6 CREAM (GRAM) TOPICAL AT BEDTIME
Refills: 0 | Status: DISCONTINUED | OUTPATIENT
Start: 2022-08-10 | End: 2022-08-26

## 2022-08-01 RX ORDER — INSULIN LISPRO 100/ML
7 VIAL (ML) SUBCUTANEOUS
Refills: 0 | Status: DISCONTINUED | OUTPATIENT
Start: 2022-08-10 | End: 2022-08-13

## 2022-08-01 RX ORDER — ASPIRIN/CALCIUM CARB/MAGNESIUM 324 MG
1 TABLET ORAL
Qty: 0 | Refills: 0 | DISCHARGE

## 2022-08-01 RX ORDER — ACETAMINOPHEN 500 MG
2 TABLET ORAL
Qty: 0 | Refills: 0 | DISCHARGE
Start: 2022-08-01

## 2022-08-01 RX ORDER — INSULIN LISPRO 100/ML
7 VIAL (ML) SUBCUTANEOUS
Qty: 0 | Refills: 0 | DISCHARGE
Start: 2022-08-01

## 2022-08-01 RX ORDER — SENNA PLUS 8.6 MG/1
2 TABLET ORAL AT BEDTIME
Refills: 0 | Status: DISCONTINUED | OUTPATIENT
Start: 2022-08-10 | End: 2022-08-26

## 2022-08-01 RX ORDER — POLYETHYLENE GLYCOL 3350 17 G/17G
17 POWDER, FOR SOLUTION ORAL
Qty: 0 | Refills: 0 | DISCHARGE
Start: 2022-08-01

## 2022-08-01 RX ORDER — ACETAMINOPHEN 500 MG
650 TABLET ORAL EVERY 6 HOURS
Refills: 0 | Status: DISCONTINUED | OUTPATIENT
Start: 2022-08-10 | End: 2022-08-26

## 2022-08-01 RX ORDER — ASPIRIN/CALCIUM CARB/MAGNESIUM 324 MG
81 TABLET ORAL DAILY
Refills: 0 | Status: DISCONTINUED | OUTPATIENT
Start: 2022-08-02 | End: 2022-08-05

## 2022-08-01 RX ORDER — PANTOPRAZOLE SODIUM 20 MG/1
1 TABLET, DELAYED RELEASE ORAL
Qty: 0 | Refills: 0 | DISCHARGE
Start: 2022-08-01

## 2022-08-01 RX ORDER — MORPHINE SULFATE 50 MG/1
2 CAPSULE, EXTENDED RELEASE ORAL ONCE
Refills: 0 | Status: DISCONTINUED | OUTPATIENT
Start: 2022-08-01 | End: 2022-08-01

## 2022-08-01 RX ORDER — HEPARIN SODIUM 5000 [USP'U]/ML
1050 INJECTION INTRAVENOUS; SUBCUTANEOUS
Qty: 25000 | Refills: 0 | Status: DISCONTINUED | OUTPATIENT
Start: 2022-08-01 | End: 2022-08-03

## 2022-08-01 RX ORDER — INSULIN LISPRO 100/ML
7 VIAL (ML) SUBCUTANEOUS
Refills: 0 | Status: DISCONTINUED | OUTPATIENT
Start: 2022-08-02 | End: 2022-08-04

## 2022-08-01 RX ORDER — CARVEDILOL PHOSPHATE 80 MG/1
6.25 CAPSULE, EXTENDED RELEASE ORAL EVERY 12 HOURS
Refills: 0 | Status: DISCONTINUED | OUTPATIENT
Start: 2022-08-01 | End: 2022-08-01

## 2022-08-01 RX ORDER — GLUCAGON INJECTION, SOLUTION 0.5 MG/.1ML
1 INJECTION, SOLUTION SUBCUTANEOUS ONCE
Refills: 0 | Status: DISCONTINUED | OUTPATIENT
Start: 2022-08-10 | End: 2022-08-26

## 2022-08-01 RX ORDER — INSULIN GLARGINE 100 [IU]/ML
15 INJECTION, SOLUTION SUBCUTANEOUS
Qty: 0 | Refills: 0 | DISCHARGE
Start: 2022-08-01

## 2022-08-01 RX ORDER — OXYCODONE HYDROCHLORIDE 5 MG/1
1 TABLET ORAL
Qty: 0 | Refills: 0 | DISCHARGE
Start: 2022-08-01

## 2022-08-01 RX ORDER — DEXTROSE 50 % IN WATER 50 %
15 SYRINGE (ML) INTRAVENOUS ONCE
Refills: 0 | Status: DISCONTINUED | OUTPATIENT
Start: 2022-08-10 | End: 2022-08-26

## 2022-08-01 RX ORDER — INSULIN LISPRO 100/ML
5 VIAL (ML) SUBCUTANEOUS
Qty: 0 | Refills: 0 | DISCHARGE
Start: 2022-08-01

## 2022-08-01 RX ORDER — SENNA PLUS 8.6 MG/1
2 TABLET ORAL
Qty: 0 | Refills: 0 | DISCHARGE
Start: 2022-08-01

## 2022-08-01 RX ORDER — DEXTROSE 50 % IN WATER 50 %
12.5 SYRINGE (ML) INTRAVENOUS ONCE
Refills: 0 | Status: DISCONTINUED | OUTPATIENT
Start: 2022-08-10 | End: 2022-08-26

## 2022-08-01 RX ORDER — LOSARTAN/HYDROCHLOROTHIAZIDE 100MG-25MG
1 TABLET ORAL
Qty: 0 | Refills: 0 | DISCHARGE

## 2022-08-01 RX ORDER — DEXAMETHASONE 0.5 MG/5ML
3 ELIXIR ORAL EVERY 12 HOURS
Refills: 0 | Status: DISCONTINUED | OUTPATIENT
Start: 2022-08-01 | End: 2022-08-02

## 2022-08-01 RX ORDER — DIVALPROEX SODIUM 500 MG/1
500 TABLET, DELAYED RELEASE ORAL EVERY 8 HOURS
Refills: 0 | Status: DISCONTINUED | OUTPATIENT
Start: 2022-08-10 | End: 2022-08-26

## 2022-08-01 RX ORDER — HEPARIN SODIUM 5000 [USP'U]/ML
1000 INJECTION INTRAVENOUS; SUBCUTANEOUS
Qty: 25000 | Refills: 0 | Status: DISCONTINUED | OUTPATIENT
Start: 2022-08-01 | End: 2022-08-01

## 2022-08-01 RX ORDER — GABAPENTIN 400 MG/1
200 CAPSULE ORAL AT BEDTIME
Refills: 0 | Status: DISCONTINUED | OUTPATIENT
Start: 2022-08-10 | End: 2022-08-26

## 2022-08-01 RX ORDER — INSULIN LISPRO 100/ML
VIAL (ML) SUBCUTANEOUS AT BEDTIME
Refills: 0 | Status: DISCONTINUED | OUTPATIENT
Start: 2022-08-10 | End: 2022-08-26

## 2022-08-01 RX ORDER — INSULIN GLARGINE 100 [IU]/ML
15 INJECTION, SOLUTION SUBCUTANEOUS AT BEDTIME
Refills: 0 | Status: DISCONTINUED | OUTPATIENT
Start: 2022-08-01 | End: 2022-08-02

## 2022-08-01 RX ORDER — CARVEDILOL PHOSPHATE 80 MG/1
6.25 CAPSULE, EXTENDED RELEASE ORAL EVERY 12 HOURS
Refills: 0 | Status: DISCONTINUED | OUTPATIENT
Start: 2022-08-02 | End: 2022-08-02

## 2022-08-01 RX ORDER — SIMVASTATIN 20 MG/1
1 TABLET, FILM COATED ORAL
Qty: 0 | Refills: 0 | DISCHARGE

## 2022-08-01 RX ORDER — METFORMIN HYDROCHLORIDE 850 MG/1
1 TABLET ORAL
Qty: 0 | Refills: 0 | DISCHARGE

## 2022-08-01 RX ORDER — INSULIN LISPRO 100/ML
10 VIAL (ML) SUBCUTANEOUS
Qty: 0 | Refills: 0 | DISCHARGE
Start: 2022-08-01

## 2022-08-01 RX ORDER — METOPROLOL TARTRATE 50 MG
1 TABLET ORAL
Qty: 0 | Refills: 0 | DISCHARGE
Start: 2022-08-01

## 2022-08-01 RX ORDER — ATORVASTATIN CALCIUM 80 MG/1
1 TABLET, FILM COATED ORAL
Qty: 0 | Refills: 0 | DISCHARGE
Start: 2022-08-01

## 2022-08-01 RX ORDER — DIVALPROEX SODIUM 500 MG/1
1 TABLET, DELAYED RELEASE ORAL
Qty: 0 | Refills: 0 | DISCHARGE
Start: 2022-08-01

## 2022-08-01 RX ORDER — POLYETHYLENE GLYCOL 3350 17 G/17G
17 POWDER, FOR SOLUTION ORAL
Refills: 0 | Status: DISCONTINUED | OUTPATIENT
Start: 2022-08-10 | End: 2022-08-16

## 2022-08-01 RX ADMIN — GABAPENTIN 200 MILLIGRAM(S): 400 CAPSULE ORAL at 21:35

## 2022-08-01 RX ADMIN — Medication 25 MILLIGRAM(S): at 20:11

## 2022-08-01 RX ADMIN — Medication 5 UNIT(S): at 12:07

## 2022-08-01 RX ADMIN — Medication 4: at 12:07

## 2022-08-01 RX ADMIN — Medication 325 MILLIGRAM(S): at 17:25

## 2022-08-01 RX ADMIN — Medication 9 UNIT(S): at 08:14

## 2022-08-01 RX ADMIN — Medication 2: at 22:57

## 2022-08-01 RX ADMIN — INSULIN GLARGINE 15 UNIT(S): 100 INJECTION, SOLUTION SUBCUTANEOUS at 22:57

## 2022-08-01 RX ADMIN — MORPHINE SULFATE 2 MILLIGRAM(S): 50 CAPSULE, EXTENDED RELEASE ORAL at 15:11

## 2022-08-01 RX ADMIN — Medication 3 MILLIGRAM(S): at 05:26

## 2022-08-01 RX ADMIN — PANTOPRAZOLE SODIUM 40 MILLIGRAM(S): 20 TABLET, DELAYED RELEASE ORAL at 05:28

## 2022-08-01 RX ADMIN — LOSARTAN POTASSIUM 100 MILLIGRAM(S): 100 TABLET, FILM COATED ORAL at 05:27

## 2022-08-01 RX ADMIN — HEPARIN SODIUM 10 UNIT(S)/HR: 5000 INJECTION INTRAVENOUS; SUBCUTANEOUS at 20:12

## 2022-08-01 RX ADMIN — POLYETHYLENE GLYCOL 3350 17 GRAM(S): 17 POWDER, FOR SOLUTION ORAL at 05:27

## 2022-08-01 RX ADMIN — Medication 10 MILLIGRAM(S): at 20:36

## 2022-08-01 RX ADMIN — DIVALPROEX SODIUM 500 MILLIGRAM(S): 500 TABLET, DELAYED RELEASE ORAL at 13:59

## 2022-08-01 RX ADMIN — DIVALPROEX SODIUM 500 MILLIGRAM(S): 500 TABLET, DELAYED RELEASE ORAL at 21:34

## 2022-08-01 RX ADMIN — MORPHINE SULFATE 2 MILLIGRAM(S): 50 CAPSULE, EXTENDED RELEASE ORAL at 21:14

## 2022-08-01 RX ADMIN — SENNA PLUS 2 TABLET(S): 8.6 TABLET ORAL at 21:36

## 2022-08-01 RX ADMIN — MORPHINE SULFATE 2 MILLIGRAM(S): 50 CAPSULE, EXTENDED RELEASE ORAL at 21:29

## 2022-08-01 RX ADMIN — Medication 6: at 08:14

## 2022-08-01 RX ADMIN — HEPARIN SODIUM 1300 UNIT(S)/HR: 5000 INJECTION INTRAVENOUS; SUBCUTANEOUS at 17:57

## 2022-08-01 RX ADMIN — Medication 0.4 MILLIGRAM(S): at 16:00

## 2022-08-01 RX ADMIN — HEPARIN SODIUM 10.5 UNIT(S)/HR: 5000 INJECTION INTRAVENOUS; SUBCUTANEOUS at 22:15

## 2022-08-01 RX ADMIN — MORPHINE SULFATE 2 MILLIGRAM(S): 50 CAPSULE, EXTENDED RELEASE ORAL at 15:40

## 2022-08-01 RX ADMIN — DIVALPROEX SODIUM 500 MILLIGRAM(S): 500 TABLET, DELAYED RELEASE ORAL at 05:27

## 2022-08-01 RX ADMIN — AMLODIPINE BESYLATE 5 MILLIGRAM(S): 2.5 TABLET ORAL at 05:27

## 2022-08-01 RX ADMIN — ATORVASTATIN CALCIUM 80 MILLIGRAM(S): 80 TABLET, FILM COATED ORAL at 21:35

## 2022-08-01 RX ADMIN — POLYETHYLENE GLYCOL 3350 17 GRAM(S): 17 POWDER, FOR SOLUTION ORAL at 21:36

## 2022-08-01 NOTE — PROGRESS NOTE ADULT - ASSESSMENT
Brain metastases due to NSCLC s/p craniotomy. Out-pt RT and systemic therapy.     Leucocytosis likely due to steroids. Trend the WBC

## 2022-08-01 NOTE — CHART NOTE - NSCHARTNOTEFT_GEN_A_CORE
Initial ECG: 15:50  Called by primary team: 15:51  Saw patient at bedside: 15:59  Called Cath Attendin:08    HPI:  81yo F h/o HTN, HLD, CAD (s/p multiple PCI, last 2021), DM (c/b peripheral neuropathy), PAD, originally sent in for further mgmt after outpt MRI spine performed for neck pain found incidental brain mass, since dx w/ metastatic lung cancer, currently POD#5 s/p craniectomy and mass resection. DAPT held for ~2weeks now. About 2 hr prior to being called pt developed 8/10 substernal chest pain, EKGs followed and eventually w/ findings c/f STEMI, cardiology called. Pt received morphine IV prior to evaluation.  Upon interview w/ cousin at bedside pt w/ 8/10 chest pain, slightly confused ("my last cath was in "), slightly diaphoretic, denies dyspnea, orthopnea, palpitations, dizziness, abd pain, n/v, BRUCE, cough.     PMHx:   Hypertension  Coronary artery disease  Hyperlipidemia  Peripheral artery disease  Myocardial infarct  Peripheral neuropathy  Diabetes    PSHx:   PAD (peripheral artery disease)  CAD (coronary artery disease)    Current Meds:   acetaminophen     Tablet .. 650 milliGRAM(s) Oral every 6 hours PRN  amLODIPine   Tablet 5 milliGRAM(s) Oral daily  atorvastatin 40 milliGRAM(s) Oral at bedtime  bisacodyl 5 milliGRAM(s) Oral daily PRN  dexAMETHasone     Tablet 3 milliGRAM(s) Oral every 12 hours  dextrose 5%. 1000 milliLiter(s) IV Continuous <Continuous>  dextrose 5%. 1000 milliLiter(s) IV Continuous <Continuous>  dextrose 50% Injectable 25 Gram(s) IV Push once  dextrose 50% Injectable 12.5 Gram(s) IV Push once  dextrose 50% Injectable 25 Gram(s) IV Push once  dextrose Oral Gel 15 Gram(s) Oral once PRN  diVALproex  milliGRAM(s) Oral every 8 hours  gabapentin 200 milliGRAM(s) Oral at bedtime  glucagon  Injectable 1 milliGRAM(s) IntraMuscular once  heparin  Infusion.  Unit(s)/Hr IV Continuous <Continuous>  insulin glargine Injectable (LANTUS) 15 Unit(s) SubCutaneous at bedtime  insulin lispro (ADMELOG) corrective regimen sliding scale   SubCutaneous Before meals and at bedtime  insulin lispro Injectable (ADMELOG) 5 Unit(s) SubCutaneous before lunch  insulin lispro Injectable (ADMELOG) 7 Unit(s) SubCutaneous before dinner  losartan 100 milliGRAM(s) Oral daily  melatonin 5 milliGRAM(s) Oral at bedtime PRN  metoprolol tartrate 25 milliGRAM(s) Oral two times a day  ondansetron Injectable 4 milliGRAM(s) IV Push every 6 hours PRN  oxyCODONE    IR 5 milliGRAM(s) Oral every 4 hours PRN  oxyCODONE    IR 10 milliGRAM(s) Oral every 4 hours PRN  pantoprazole    Tablet 40 milliGRAM(s) Oral before breakfast  polyethylene glycol 3350 17 Gram(s) Oral two times a day  senna 2 Tablet(s) Oral at bedtime    Allergies:  adhesives (Pruritus)  No Known Drug Allergies    FAMILY HISTORY:  Family history of stroke (Sibling)    REVIEW OF SYSTEMS:  As above.    Physical Exam:  T(F): 98.1 (), Max: 98.1 ()  HR: 90 () (58 - 90)  BP: 163/79 () (104/60 - 169/84)  RR: 18 ()  SpO2: 95% ()    - gen: tired appearing elderly woman, laying in hospital bed, mild distress  - HEENT: s/p surgery, NC  - neck: supple, no JVD  - heart: RRR, nml S1/S2, no RMG  - lungs: trace bibasilar rales, nml WOB, no cough  - abd: obese, soft, NTND, ND  - ext: WWP, PPP, no BRUCE    Cardiovascular Diagnostic Testing:  ECG: Personally reviewed  Echo: Personally reviewed  Stress Testing: Personally reviewed  Cath: Personally reviewed  Imaging: Personally reviewed  Labs: Personally reviewed                        12.8   19.00 )-----------( 258      ( 2022 07:34 )             38.5         141  |  102  |  42<H>  ----------------------------<  97  4.4   |  28  |  0.94    Ca    9.2      2022 07:13    PT/INR - ( 01 Aug 2022 17:37 )   PT: 10.5 sec;   INR: 0.91 ratio      CARDIAC MARKERS ( 01 Aug 2022 15:26 )  22 ng/L / x     / x     / 78 U/L / x     / 2.0 ng/mL    A/P:  81yo F w/ newly dx metastatic lung cancer c/b brain mets, POD#5 s/p craniotomy w/ mass resection, off of DAPT x 2w, now w/ CP and EKG c/w inferolateral STEMI    - discussed at length w/ interventionalist on call (Dr. Jc), not offering interventional management given potential benefits would be outweighed by the risks of the burden of anticoagulation and antiplatelet agents required for therapy given recent brain surgery and metastatic cancer  - plan for medical management, dispo CCU, risk/benefits/alternatives discussed at length w/ neurosurgical team, interventional cardiology, and pt's HCP (son), will opt for ASA load, hep gtt (no bolus), will continue to discuss role of P2Y12 agents though likely risks to outweigh benefits in this setting  - appreciate nsgy recs re interval imaging required after loading  - hold amlodipine, losartan  - c/t metoprolol, atorvastatin    Ricardo Govea MD  Cardiology Fellow - F2    For all New Consults and Questions:  [email or web address]   Login: cardfellows    *** Recommendations are preliminary until cosigned by the attending.

## 2022-08-01 NOTE — PROGRESS NOTE ADULT - ASSESSMENT
Assessment  DMT2: 82y Female with DM T2 with hyperglycemia, A1C 8.4%, was on oral meds and insulin at home, now on basal bolus insulin, received partial-dose Lantus last night (10u instead of 20u) 2/2 blood sugars trending down, FS now elevated today, no hypoglycemias. Patient is eating meals, dexamethasone dose decreased, planning one week steroid taper per Neurosurgery.  Brain Mass: postop, on meds, monitored, FU Neurosurgery.  Lung Mass: s/p bronchoscopy, CA workup in progress, monitored.  Thyroid Nodules: seen on imaging, B/L subcentimeter thyroid nodules, she denies neck mass/pain/dysphagia, euthyroid, reports known history and is being followed by Dr. Richardson.  Adrenal Nodule: seen on imaging, 1.2 cm left thyroid nodule, unknown history. AM cortisol suppressed 2/2 current steroid use, aldosterone and metanephrine labs in normal range.        Troymichelle Thibodeaux MD  [phone]

## 2022-08-01 NOTE — CHART NOTE - NSCHARTNOTEFT_GEN_A_CORE
note delayed due to patient care.  called by rn for patient having chest pain.  patient describes pain and across her upper chest and radiating up to her head.  ekg done showing st elevations in multiple leads.  cardiac enzymes sent.  cardiology following and reviewed ekg, recommended repeating ekg and calling for cardiac cath consult (patient with a history of cardiac stents and she has been off her aspirin and plavix for recent craniotomy).  repeat ekg with more pronounced st elevations in multiple leads, reviewed with cardiology fellow Dr. Govea.  bedside tte done.  decision made by cardiology that patient too high risk for cardiac cath with recent craniotomy.  recommended aspirin 325mg po x1 and starting heparin gtt no bolus.  patient's son updated by Dr. Govea and once in CCU, will discuss further treatment options with son.  placed on telemetry and will transfer to CCU.  serial cardiac enzymes.  plan discussed w/ Dr. Loomis by neurosurgical residents.

## 2022-08-01 NOTE — PROGRESS NOTE ADULT - PROBLEM SELECTOR PLAN 1
Will adjust Lantus to 15u at bedtime.  Will adjust Admelog to be 7/5/7 units before each meal as well as Admelog correction scale coverage. Will continue monitoring FS and FU, will adjust dose as steroids are tapered/dc.  If blood sugars are stable, can DC on current insulin regimen, FU with her Endocrinologist Dr Chaz Richardson 2weeks.  Discussed plan with patient. Counseled that blood sugars will trend down as steroids are tapered/dc, counseled that insulin dose will have to be adjusted based on blood sugars/steroid. Counseled for compliance with consistent low carb diet and exercise as tolerated outpatient.

## 2022-08-01 NOTE — PROGRESS NOTE ADULT - SUBJECTIVE AND OBJECTIVE BOX
Chief complaint  Patient is a 82y old  Female who presents with a chief complaint of abnormal imaging (01 Aug 2022 12:10)   Review of systems  Patient in bed, looks comfortable, no hypoglycemic episodes.    Labs and Fingersticks  CAPILLARY BLOOD GLUCOSE      POCT Blood Glucose.: 218 mg/dL (01 Aug 2022 11:30)  POCT Blood Glucose.: 261 mg/dL (01 Aug 2022 07:43)  POCT Blood Glucose.: 199 mg/dL (31 Jul 2022 21:44)  POCT Blood Glucose.: 76 mg/dL (31 Jul 2022 21:02)  POCT Blood Glucose.: 144 mg/dL (31 Jul 2022 16:23)      Anion Gap, Serum: 11 (07-31 @ 07:13)      Calcium, Total Serum: 9.2 (07-31 @ 07:13)          07-31    141  |  102  |  42<H>  ----------------------------<  97  4.4   |  28  |  0.94    Ca    9.2      31 Jul 2022 07:13                          12.8   19.00 )-----------( 258      ( 31 Jul 2022 07:34 )             38.5     Medications  MEDICATIONS  (STANDING):  amLODIPine   Tablet 5 milliGRAM(s) Oral daily  atorvastatin 40 milliGRAM(s) Oral at bedtime  dexAMETHasone     Tablet 3 milliGRAM(s) Oral every 12 hours  dextrose 5%. 1000 milliLiter(s) (50 mL/Hr) IV Continuous <Continuous>  dextrose 5%. 1000 milliLiter(s) (100 mL/Hr) IV Continuous <Continuous>  dextrose 50% Injectable 25 Gram(s) IV Push once  dextrose 50% Injectable 12.5 Gram(s) IV Push once  dextrose 50% Injectable 25 Gram(s) IV Push once  diVALproex  milliGRAM(s) Oral every 8 hours  enoxaparin Injectable 40 milliGRAM(s) SubCutaneous <User Schedule>  gabapentin 200 milliGRAM(s) Oral at bedtime  glucagon  Injectable 1 milliGRAM(s) IntraMuscular once  insulin glargine Injectable (LANTUS) 15 Unit(s) SubCutaneous at bedtime  insulin lispro (ADMELOG) corrective regimen sliding scale   SubCutaneous Before meals and at bedtime  insulin lispro Injectable (ADMELOG) 5 Unit(s) SubCutaneous before lunch  insulin lispro Injectable (ADMELOG) 7 Unit(s) SubCutaneous before dinner  losartan 100 milliGRAM(s) Oral daily  metoprolol tartrate 25 milliGRAM(s) Oral two times a day  pantoprazole    Tablet 40 milliGRAM(s) Oral before breakfast  polyethylene glycol 3350 17 Gram(s) Oral two times a day  senna 2 Tablet(s) Oral at bedtime      Physical Exam  General: Patient comfortable in bed  Vital Signs Last 12 Hrs  T(F): 98.1 (08-01-22 @ 04:36), Max: 98.1 (08-01-22 @ 04:36)  HR: 62 (08-01-22 @ 09:00) (58 - 62)  BP: 104/60 (08-01-22 @ 09:00) (104/60 - 124/83)  BP(mean): --  RR: 18 (08-01-22 @ 09:00) (18 - 18)  SpO2: 98% (08-01-22 @ 09:00) (98% - 99%)  Neck: No palpable thyroid nodules.  CVS: S1S2, No murmurs  Respiratory: No wheezing, no crepitations  GI: Abdomen soft, bowel sounds positive  Musculoskeletal:  edema lower extremities.   Skin: No skin rashes, no ecchymosis    Diagnostics    Metanephrine, Plasma: AM Sched. Collection: 21-Jul-2022 06:00 (07-20 @ 10:54)  Aldosterone, Serum: AM Sched. Collection: 21-Jul-2022 06:00 (07-20 @ 10:54)  Cortisol AM, Serum: AM Sched. Collection: 21-Jul-2022 06:00 (07-20 @ 10:54)

## 2022-08-01 NOTE — CHART NOTE - NSCHARTNOTEFT_GEN_A_CORE
CCU Accept Note    LANIE BERTRAND  82y  Patient is a 82y old  Female who presents with a chief complaint of abnormal imaging (01 Aug 2022 14:38)      ====================  HPI & Hospital Course:     81 y/o female (former smoker) with PMHx of HTN, CAD, HLD, MI, peripheral neuropathy, DM and peripheral artery disease presented to the ED for imaging for her head due to abnormal spine MRI by her orthopedist (Dr. Castro) during workup for her spine for her chronic back problems. The orthopedist noted something in her brain. Otherwise pt has no symptoms, stated that since 04/2022 she has had gait/balance issues. Endorsed that if she sits for a while and gets up, she feels like she is "drunk" and feels off. Denied chest pain, SOB, fevers, chills, numbness/paresthesias, muscular weakness, dizziness, abdominal pain, headaches.    MR head significant for R temporal lobe lesion with edema, midline shift and uncal herniation concerning for metastatic disease. 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 s/p craniotomy for resection of brain mass 7/27. ASA/Plavix was held in preparation for craniotomy. 8/1 patient began to complain of 8/10 CP, EKG with STEMI in multiple leads. Patient ASA loaded and restarted on heparin gtt.    Past Medical History:   Coronary artery disease   Diabetes   Hyperlipidemia   Hypertension   Myocardial infarct   Peripheral artery disease   Peripheral neuropathy.       Past Surgical History:   CAD (coronary artery disease) stents  PAD (peripheral artery disease)      Home Medications:  acetaminophen 325 mg oral tablet: 2 tab(s) orally every 6 hours, As needed, Temp greater or equal to 38C (100.4F), Mild Pain (1 - 3) (01 Aug 2022 13:52)  amLODIPine 5 mg oral tablet: 1 tab(s) orally once a day (01 Aug 2022 13:52)  atorvastatin 40 mg oral tablet: 1 tab(s) orally once a day (at bedtime) (01 Aug 2022 13:52)  bisacodyl 5 mg oral delayed release tablet: 1 tab(s) orally once a day, As needed, Constipation (01 Aug 2022 13:52)  dexamethasone 2 mg oral tablet: 1 tab po every 8 hours for 2 days, then 1 tab po q12 hours x2 days, then 1 tab po daily x3 days, then stop (01 Aug 2022 13:52)  divalproex sodium 500 mg oral delayed release tablet: 1 tab(s) orally every 8 hours (01 Aug 2022 13:52)  enoxaparin: 40 milligram(s) subcutaneous once a day (at bedtime) (01 Aug 2022 13:52)  gabapentin 100 mg oral capsule: 2 cap(s) orally once a day (at bedtime) (01 Aug 2022 13:52)  insulin glargine 100 units/mL subcutaneous solution: 15 unit(s) subcutaneous once a day (at bedtime) (01 Aug 2022 13:52)  insulin lispro 100 units/mL injectable solution: 5 unit(s) subcutaneous once a day before lunch (01 Aug 2022 13:52)  insulin lispro 100 units/mL injectable solution: 7 unit(s) subcutaneous once a day before breakfast (01 Aug 2022 13:52)  insulin lispro 100 units/mL injectable solution: 7 unit(s) subcutaneous once a day before dinner (01 Aug 2022 13:52)  insulin lispro 100 units/mL injectable solution: moderate dose sliding scale as per hospital/facility protocol, 3 times a day (before meals) (01 Aug 2022 13:52)  losartan 100 mg oral tablet: 1 tab(s) orally once a day (01 Aug 2022 13:52)  melatonin 5 mg oral tablet: 1 tab(s) orally once a day (at bedtime), As needed, Sleep (01 Aug 2022 13:52)  metoprolol tartrate 25 mg oral tablet: 1 tab(s) orally 2 times a day (01 Aug 2022 13:52)  oxyCODONE 10 mg oral tablet: 1 tab(s) orally every 4 hours, As needed, Severe Pain (7 - 10) (01 Aug 2022 13:52)  oxyCODONE 5 mg oral tablet: 1 tab(s) orally every 4 hours, As needed, Moderate Pain (4 - 6) (01 Aug 2022 13:52)  pantoprazole 40 mg oral delayed release tablet: 1 tab(s) orally once a day (before a meal) (01 Aug 2022 13:52)  polyethylene glycol 3350 oral powder for reconstitution: 17 gram(s) orally 2 times a day (01 Aug 2022 13:52)  senna leaf extract oral tablet: 2 tab(s) orally once a day (at bedtime) (01 Aug 2022 13:52)      Current Medications:   MEDICATIONS  (STANDING):  amLODIPine   Tablet 5 milliGRAM(s) Oral daily  aspirin 325 milliGRAM(s) Oral once  atorvastatin 40 milliGRAM(s) Oral at bedtime  dexAMETHasone     Tablet 3 milliGRAM(s) Oral every 12 hours  dextrose 5%. 1000 milliLiter(s) (50 mL/Hr) IV Continuous <Continuous>  dextrose 5%. 1000 milliLiter(s) (100 mL/Hr) IV Continuous <Continuous>  dextrose 50% Injectable 25 Gram(s) IV Push once  dextrose 50% Injectable 12.5 Gram(s) IV Push once  dextrose 50% Injectable 25 Gram(s) IV Push once  diVALproex  milliGRAM(s) Oral every 8 hours  enoxaparin Injectable 40 milliGRAM(s) SubCutaneous <User Schedule>  gabapentin 200 milliGRAM(s) Oral at bedtime  glucagon  Injectable 1 milliGRAM(s) IntraMuscular once  heparin  Infusion.  Unit(s)/Hr (13 mL/Hr) IV Continuous <Continuous>  insulin glargine Injectable (LANTUS) 15 Unit(s) SubCutaneous at bedtime  insulin lispro (ADMELOG) corrective regimen sliding scale   SubCutaneous Before meals and at bedtime  insulin lispro Injectable (ADMELOG) 5 Unit(s) SubCutaneous before lunch  insulin lispro Injectable (ADMELOG) 7 Unit(s) SubCutaneous before dinner  losartan 100 milliGRAM(s) Oral daily  metoprolol tartrate 25 milliGRAM(s) Oral two times a day  nitroglycerin     SubLingual 0.4 milliGRAM(s) SubLingual once  pantoprazole    Tablet 40 milliGRAM(s) Oral before breakfast  polyethylene glycol 3350 17 Gram(s) Oral two times a day  senna 2 Tablet(s) Oral at bedtime    MEDICATIONS  (PRN):  acetaminophen     Tablet .. 650 milliGRAM(s) Oral every 6 hours PRN Temp greater or equal to 38C (100.4F), Mild Pain (1 - 3)  bisacodyl 5 milliGRAM(s) Oral daily PRN Constipation  dextrose Oral Gel 15 Gram(s) Oral once PRN Blood Glucose LESS THAN 70 milliGRAM(s)/deciliter  melatonin 5 milliGRAM(s) Oral at bedtime PRN Sleep  ondansetron Injectable 4 milliGRAM(s) IV Push every 6 hours PRN Nausea and/or Vomiting  oxyCODONE    IR 5 milliGRAM(s) Oral every 4 hours PRN Moderate Pain (4 - 6)  oxyCODONE    IR 10 milliGRAM(s) Oral every 4 hours PRN Severe Pain (7 - 10)      Allergies: NKDA    FAMILY HISTORY:  Sibling  Still living? No  Family history of stroke, Age at diagnosis: Age Unknown.    Social History:  Social History (marital status, living situation, occupation, tobacco use, alcohol and drug use, and sexual history): no tob  social etoh  lives alone      ====================  Vital Signs Last 24 Hrs  T(C): 36.7 (01 Aug 2022 04:36), Max: 36.7 (31 Jul 2022 22:48)  T(F): 98.1 (01 Aug 2022 04:36), Max: 98.1 (31 Jul 2022 22:48)  HR: 62 (01 Aug 2022 09:00) (58 - 75)  BP: 104/60 (01 Aug 2022 09:00) (104/60 - 124/83)  BP(mean): --  RR: 18 (01 Aug 2022 09:00) (18 - 18)  SpO2: 98% (01 Aug 2022 09:00) (96% - 99%)    Parameters below as of 01 Aug 2022 09:00  Patient On (Oxygen Delivery Method): room air        Adult Advanced Hemodynamics Last 24 Hrs  CVP(mm Hg): --  CVP(cm H2O): --  CO: --  CI: --  PA: --  PA(mean): --  PCWP: --  SVR: --  SVRI: --  PVR: --  PVRI: --    Physical Exam:   General: NAD  HEENT: PERRL, EOMI, normal sclera and conjunctiva, no oral lesions  Neck: Supple, no JVD  Lungs: CTA bilaterally  Heart: RRR, normal S1S2, no murmurs/rubs/gallops  Abdomen: Soft, ND/NT  Extremities: 2+ peripheral pulses, no cyanosis/clubbing/edema, full ROM  Skin: Warm, well-perfused  Neuro: A&O x3, no focal deficits      ====================  Labs & Imaging:   CBC Full  -  ( 31 Jul 2022 07:34 )  WBC Count : 19.00 K/uL  RBC Count : 4.13 M/uL  Hemoglobin : 12.8 g/dL  Hematocrit : 38.5 %  Platelet Count - Automated : 258 K/uL  Mean Cell Volume : 93.2 fl  Mean Cell Hemoglobin : 31.0 pg  Mean Cell Hemoglobin Concentration : 33.2 gm/dL  Auto Neutrophil # : x  Auto Lymphocyte # : x  Auto Monocyte # : x  Auto Eosinophil # : x  Auto Basophil # : x  Auto Neutrophil % : x  Auto Lymphocyte % : x  Auto Monocyte % : x  Auto Eosinophil % : x  Auto Basophil % : x    07-31    141  |  102  |  42<H>  ----------------------------<  97  4.4   |  28  |  0.94    Ca    9.2      31 Jul 2022 07:13          CARDIAC MARKERS ( 01 Aug 2022 15:26 )  x     / x     / 78 U/L / x     / 2.0 ng/mL      Echo 7/22/22: Normal left ventricular internal dimensions with discrete upper septal hypertrophy. Estimated ejection fraction 55%, The inferolateral wall is akinetic.            ====================  Assessment & Plan:   Patient is an 82 year old female that presented to ED with abnormal MRI reading (discovered by patient's orthopedic), also with gait/balance issues since April, found to have R temporal brain mass c/f metastatic brain lesion now s/p craniotomy 7/27. While pending surgery, AC held, and on 8/1 presented with CP found to have STEMI on EKG. ASA loaded and started on hep gtt 8/1 and transferred to CCU for further management.    ====================  NEUROLOGY    #Brain lesion  - s/p craniotomy for resection of brain mass 7/27  - c/w Keppra   - c/w dexamethasone  - repeat CTH noncon 1 hour after resuming ASA load and hep gtt     #Peripheral neuropathy  - c/w gabapentin    ====================  CARDIOVASCULAR    #CAD s/p PCI  #STEMI  - TTE 7/22 with normal LV internal dimensions with discrete upper septal hypertrophy. EF 55%, inferolateral wall is akinetic.  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  - no indication for pulmonary medications or antibiotics  - Pulm following, appreciate recs    - satting well on RA  - incentive mario    ====================  GI    - CC Diet  - pantoprazole 40 qD  - senna, dulcolax    ====================  ENDOCRINE    #DM  - c/w Lantus 15 qhs, Admelog 7 before breakfast and dinner, Admelog 5 before lunch  - moderate dose correctional scale  - Endo following, appreciate recs     ====================  RENAL    - No active issues  - Monitor UOP, lytes    ====================  HEMATOLOGY/ONCOLOGY    #Leukocytosis  - iso dexamethasone, no s/s infection  - ctm    #DVT PPx  - will resume ASA load, heparin gtt    ====================  INFECTIOUS DISEASES    - No active issues    ====================    Betty Reed MD  Internal Medicine Resident CCU Accept Note    LANIE BERTRAND  82y  Patient is a 82y old  Female who presents with a chief complaint of abnormal imaging (01 Aug 2022 14:38)      ====================  HPI & Hospital Course:     83 y/o female (former smoker) with PMHx of HTN, CAD, HLD, MI, peripheral neuropathy, DM and peripheral artery disease presented to the ED for imaging for her head due to abnormal spine MRI by her orthopedist (Dr. Castro) during workup for her spine for her chronic back problems. The orthopedist noted something in her brain. Otherwise pt has no symptoms, stated that since 04/2022 she has had gait/balance issues. Endorsed that if she sits for a while and gets up, she feels like she is "drunk" and feels off. Denied chest pain, SOB, fevers, chills, numbness/paresthesias, muscular weakness, dizziness, abdominal pain, headaches.    MR head significant for R temporal lobe lesion with edema, midline shift and uncal herniation concerning for metastatic disease. 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 s/p craniotomy for resection of brain mass 7/27. ASA/Plavix was held in preparation for craniotomy. 8/1 patient began to complain of 8/10 CP, EKG with STEMI in multiple leads. Patient ASA loaded and restarted on heparin gtt.    Past Medical History:   Coronary artery disease   Diabetes   Hyperlipidemia   Hypertension   Myocardial infarct   Peripheral artery disease   Peripheral neuropathy.       Past Surgical History:   CAD (coronary artery disease) stents  PAD (peripheral artery disease)      Home Medications:  acetaminophen 325 mg oral tablet: 2 tab(s) orally every 6 hours, As needed, Temp greater or equal to 38C (100.4F), Mild Pain (1 - 3) (01 Aug 2022 13:52)  amLODIPine 5 mg oral tablet: 1 tab(s) orally once a day (01 Aug 2022 13:52)  atorvastatin 40 mg oral tablet: 1 tab(s) orally once a day (at bedtime) (01 Aug 2022 13:52)  bisacodyl 5 mg oral delayed release tablet: 1 tab(s) orally once a day, As needed, Constipation (01 Aug 2022 13:52)  dexamethasone 2 mg oral tablet: 1 tab po every 8 hours for 2 days, then 1 tab po q12 hours x2 days, then 1 tab po daily x3 days, then stop (01 Aug 2022 13:52)  divalproex sodium 500 mg oral delayed release tablet: 1 tab(s) orally every 8 hours (01 Aug 2022 13:52)  enoxaparin: 40 milligram(s) subcutaneous once a day (at bedtime) (01 Aug 2022 13:52)  gabapentin 100 mg oral capsule: 2 cap(s) orally once a day (at bedtime) (01 Aug 2022 13:52)  insulin glargine 100 units/mL subcutaneous solution: 15 unit(s) subcutaneous once a day (at bedtime) (01 Aug 2022 13:52)  insulin lispro 100 units/mL injectable solution: 5 unit(s) subcutaneous once a day before lunch (01 Aug 2022 13:52)  insulin lispro 100 units/mL injectable solution: 7 unit(s) subcutaneous once a day before breakfast (01 Aug 2022 13:52)  insulin lispro 100 units/mL injectable solution: 7 unit(s) subcutaneous once a day before dinner (01 Aug 2022 13:52)  insulin lispro 100 units/mL injectable solution: moderate dose sliding scale as per hospital/facility protocol, 3 times a day (before meals) (01 Aug 2022 13:52)  losartan 100 mg oral tablet: 1 tab(s) orally once a day (01 Aug 2022 13:52)  melatonin 5 mg oral tablet: 1 tab(s) orally once a day (at bedtime), As needed, Sleep (01 Aug 2022 13:52)  metoprolol tartrate 25 mg oral tablet: 1 tab(s) orally 2 times a day (01 Aug 2022 13:52)  oxyCODONE 10 mg oral tablet: 1 tab(s) orally every 4 hours, As needed, Severe Pain (7 - 10) (01 Aug 2022 13:52)  oxyCODONE 5 mg oral tablet: 1 tab(s) orally every 4 hours, As needed, Moderate Pain (4 - 6) (01 Aug 2022 13:52)  pantoprazole 40 mg oral delayed release tablet: 1 tab(s) orally once a day (before a meal) (01 Aug 2022 13:52)  polyethylene glycol 3350 oral powder for reconstitution: 17 gram(s) orally 2 times a day (01 Aug 2022 13:52)  senna leaf extract oral tablet: 2 tab(s) orally once a day (at bedtime) (01 Aug 2022 13:52)      Current Medications:   MEDICATIONS  (STANDING):  amLODIPine   Tablet 5 milliGRAM(s) Oral daily  aspirin 325 milliGRAM(s) Oral once  atorvastatin 40 milliGRAM(s) Oral at bedtime  dexAMETHasone     Tablet 3 milliGRAM(s) Oral every 12 hours  dextrose 5%. 1000 milliLiter(s) (50 mL/Hr) IV Continuous <Continuous>  dextrose 5%. 1000 milliLiter(s) (100 mL/Hr) IV Continuous <Continuous>  dextrose 50% Injectable 25 Gram(s) IV Push once  dextrose 50% Injectable 12.5 Gram(s) IV Push once  dextrose 50% Injectable 25 Gram(s) IV Push once  diVALproex  milliGRAM(s) Oral every 8 hours  enoxaparin Injectable 40 milliGRAM(s) SubCutaneous <User Schedule>  gabapentin 200 milliGRAM(s) Oral at bedtime  glucagon  Injectable 1 milliGRAM(s) IntraMuscular once  heparin  Infusion.  Unit(s)/Hr (13 mL/Hr) IV Continuous <Continuous>  insulin glargine Injectable (LANTUS) 15 Unit(s) SubCutaneous at bedtime  insulin lispro (ADMELOG) corrective regimen sliding scale   SubCutaneous Before meals and at bedtime  insulin lispro Injectable (ADMELOG) 5 Unit(s) SubCutaneous before lunch  insulin lispro Injectable (ADMELOG) 7 Unit(s) SubCutaneous before dinner  losartan 100 milliGRAM(s) Oral daily  metoprolol tartrate 25 milliGRAM(s) Oral two times a day  nitroglycerin     SubLingual 0.4 milliGRAM(s) SubLingual once  pantoprazole    Tablet 40 milliGRAM(s) Oral before breakfast  polyethylene glycol 3350 17 Gram(s) Oral two times a day  senna 2 Tablet(s) Oral at bedtime    MEDICATIONS  (PRN):  acetaminophen     Tablet .. 650 milliGRAM(s) Oral every 6 hours PRN Temp greater or equal to 38C (100.4F), Mild Pain (1 - 3)  bisacodyl 5 milliGRAM(s) Oral daily PRN Constipation  dextrose Oral Gel 15 Gram(s) Oral once PRN Blood Glucose LESS THAN 70 milliGRAM(s)/deciliter  melatonin 5 milliGRAM(s) Oral at bedtime PRN Sleep  ondansetron Injectable 4 milliGRAM(s) IV Push every 6 hours PRN Nausea and/or Vomiting  oxyCODONE    IR 5 milliGRAM(s) Oral every 4 hours PRN Moderate Pain (4 - 6)  oxyCODONE    IR 10 milliGRAM(s) Oral every 4 hours PRN Severe Pain (7 - 10)      Allergies: NKDA    FAMILY HISTORY:  Sibling  Still living? No  Family history of stroke, Age at diagnosis: Age Unknown.    Social History:  Social History (marital status, living situation, occupation, tobacco use, alcohol and drug use, and sexual history): no tob  social etoh  lives alone      ====================  Vital Signs Last 24 Hrs  T(C): 36.7 (01 Aug 2022 04:36), Max: 36.7 (31 Jul 2022 22:48)  T(F): 98.1 (01 Aug 2022 04:36), Max: 98.1 (31 Jul 2022 22:48)  HR: 62 (01 Aug 2022 09:00) (58 - 75)  BP: 104/60 (01 Aug 2022 09:00) (104/60 - 124/83)  BP(mean): --  RR: 18 (01 Aug 2022 09:00) (18 - 18)  SpO2: 98% (01 Aug 2022 09:00) (96% - 99%)    Parameters below as of 01 Aug 2022 09:00  Patient On (Oxygen Delivery Method): room air        Adult Advanced Hemodynamics Last 24 Hrs  CVP(mm Hg): --  CVP(cm H2O): --  CO: --  CI: --  PA: --  PA(mean): --  PCWP: --  SVR: --  SVRI: --  PVR: --  PVRI: --    Physical Exam:   General: NAD  HEENT: Craniotomy scar noted  Neck: Supple, no JVD  Lungs: CTA bilaterally  Heart: RRR, normal S1S2, no murmurs/rubs/gallops  Abdomen: Soft, ND/NT  Extremities: 2+ peripheral pulses, no cyanosis/clubbing/edema, full ROM  Skin: Warm, well-perfused  Neuro: A&O x3, no focal deficits      ====================  Labs & Imaging:   CBC Full  -  ( 31 Jul 2022 07:34 )  WBC Count : 19.00 K/uL  RBC Count : 4.13 M/uL  Hemoglobin : 12.8 g/dL  Hematocrit : 38.5 %  Platelet Count - Automated : 258 K/uL  Mean Cell Volume : 93.2 fl  Mean Cell Hemoglobin : 31.0 pg  Mean Cell Hemoglobin Concentration : 33.2 gm/dL  Auto Neutrophil # : x  Auto Lymphocyte # : x  Auto Monocyte # : x  Auto Eosinophil # : x  Auto Basophil # : x  Auto Neutrophil % : x  Auto Lymphocyte % : x  Auto Monocyte % : x  Auto Eosinophil % : x  Auto Basophil % : x    07-31    141  |  102  |  42<H>  ----------------------------<  97  4.4   |  28  |  0.94    Ca    9.2      31 Jul 2022 07:13          CARDIAC MARKERS ( 01 Aug 2022 15:26 )  x     / x     / 78 U/L / x     / 2.0 ng/mL      Echo 7/22/22: Normal left ventricular internal dimensions with discrete upper septal hypertrophy. Estimated ejection fraction 55%, The inferolateral wall is akinetic.            ====================  Assessment & Plan:   Patient is an 82 year old female that presented to ED with abnormal MRI reading (discovered by patient's orthopedic), also with gait/balance issues since April, found to have R temporal brain mass c/f metastatic brain lesion now s/p craniotomy 7/27. While pending surgery, AC held, and on 8/1 presented with CP found to have STEMI on EKG. ASA loaded and started on hep gtt 8/1 and transferred to CCU for further management.    ====================  NEUROLOGY    #Brain lesion  - s/p craniotomy for resection of brain mass 7/27  - c/w Keppra   - c/w dexamethasone  - repeat CTH noncon 1 hour after resuming ASA load and hep gtt     #Peripheral neuropathy  - c/w gabapentin    ====================  CARDIOVASCULAR    #CAD s/p PCI  #STEMI  - TTE 7/22 with normal LV internal dimensions with discrete upper septal hypertrophy. EF 55%, inferolateral wall is akinetic.  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  - no indication for pulmonary medications or antibiotics  - Pulm following, appreciate recs    - satting well on RA  - incentive mario    ====================  GI    - CC Diet  - pantoprazole 40 qD  - senna, dulcolax    ====================  ENDOCRINE    #DM  - c/w Lantus 15 qhs, Admelog 7 before breakfast and dinner, Admelog 5 before lunch  - moderate dose correctional scale  - Endo following, appreciate recs     ====================  RENAL    - No active issues  - Monitor UOP, lytes    ====================  HEMATOLOGY/ONCOLOGY    #Leukocytosis  - iso dexamethasone, no s/s infection  - ctm    #DVT PPx  - will resume ASA load, heparin gtt    ====================  INFECTIOUS DISEASES    - No active issues    ====================    Betty Reed MD  Internal Medicine Resident

## 2022-08-01 NOTE — DISCHARGE NOTE NURSING/CASE MANAGEMENT/SOCIAL WORK - PATIENT PORTAL LINK FT
You can access the FollowMyHealth Patient Portal offered by [facility] by registering at the following website: [email or web address]. By joining [employer]’s FollowMyHealth portal, you will also be able to view your health information using other applications (apps) compatible with our system.

## 2022-08-01 NOTE — PROGRESS NOTE ADULT - SUBJECTIVE AND OBJECTIVE BOX
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; ataxia; frontal lobe syndrome    INTERVAL HISTORY: awake and alert on the neurology lance; much less pressured speech following right craniotomy for tumor resection; out of bed and into the chair with some assistance given ataxia; EEG -> evidence for right hemispheric (right temporal maximal) cortical/subcortical structural lesion - there were no epileptiform abnormalities or seizures recorded; s/p bronchoscopy with endobronchial brushings/biopsies of an obstructing left upper lobe endobronchial lesion -> 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; no shortness of breath or hypoxemia on room air; no cough, sputum production, hemoptysis, chest congestion or wheeze; no fevers, chills or sweats; no chest pain/pressure or palpitations;     REVIEW OF SYSTEMS:  Constitutional: As per interval history  HEENT: Within normal limits  CV: As per interval history  Resp: As per interval history  GI: Within normal limits   : Within normal limits  Musculoskeletal: Within normal limits  Skin: Within normal limits  Neurological: ataxia - pressured speech -> improved  Psychiatric: Within normal limits  Endocrine: Within normal limits  Hematologic/Lymphatic: lung cancer  Allergic/Immunologic: Within normal limits    MEDICATIONS:     Cardiovascular:  amLODIPine   Tablet 5 milliGRAM(s) Oral daily  losartan 100 milliGRAM(s) Oral daily  metoprolol tartrate 25 milliGRAM(s) Oral two times a day    Other:  atorvastatin 40 milliGRAM(s) Oral at bedtime  dexAMETHasone     Tablet 3 milliGRAM(s) Oral every 12 hours  dextrose 5%. 1000 milliLiter(s) IV Continuous <Continuous>  dextrose 5%. 1000 milliLiter(s) IV Continuous <Continuous>  dextrose 50% Injectable 12.5 Gram(s) IV Push once  dextrose 50% Injectable 25 Gram(s) IV Push once  dextrose 50% Injectable 25 Gram(s) IV Push once  diVALproex  milliGRAM(s) Oral every 8 hours  enoxaparin Injectable 40 milliGRAM(s) SubCutaneous <User Schedule>  gabapentin 200 milliGRAM(s) Oral at bedtime  glucagon  Injectable 1 milliGRAM(s) IntraMuscular once  insulin glargine Injectable (LANTUS) 15 Unit(s) SubCutaneous at bedtime  insulin lispro (ADMELOG) corrective regimen sliding scale   SubCutaneous Before meals and at bedtime  insulin lispro Injectable (ADMELOG) 5 Unit(s) SubCutaneous before lunch  insulin lispro Injectable (ADMELOG) 7 Unit(s) SubCutaneous before dinner  pantoprazole    Tablet 40 milliGRAM(s) Oral before breakfast  polyethylene glycol 3350 17 Gram(s) Oral two times a day  senna 2 Tablet(s) Oral at bedtime    MEDICATIONS  (PRN):  acetaminophen     Tablet .. 650 milliGRAM(s) Oral every 6 hours PRN Temp greater or equal to 38C (100.4F), Mild Pain (1 - 3)  bisacodyl 5 milliGRAM(s) Oral daily PRN Constipation  dextrose Oral Gel 15 Gram(s) Oral once PRN Blood Glucose LESS THAN 70 milliGRAM(s)/deciliter  melatonin 5 milliGRAM(s) Oral at bedtime PRN Sleep  ondansetron Injectable 4 milliGRAM(s) IV Push every 6 hours PRN Nausea and/or Vomiting  oxyCODONE    IR 5 milliGRAM(s) Oral every 4 hours PRN Moderate Pain (4 - 6)  oxyCODONE    IR 10 milliGRAM(s) Oral every 4 hours PRN Severe Pain (7 - 10)    OBJECTIVE:    POCT Blood Glucose.: 218 mg/dL (01 Aug 2022 11:30)  POCT Blood Glucose.: 261 mg/dL (01 Aug 2022 07:43)  POCT Blood Glucose.: 199 mg/dL (31 Jul 2022 21:44)  POCT Blood Glucose.: 76 mg/dL (31 Jul 2022 21:02)  POCT Blood Glucose.: 144 mg/dL (31 Jul 2022 16:23)    PHYSICAL EXAM:       ICU Vital Signs Last 24 Hrs  T(C): 36.7 (01 Aug 2022 04:36), Max: 36.8 (31 Jul 2022 17:00)  T(F): 98.1 (01 Aug 2022 04:36), Max: 98.3 (31 Jul 2022 17:00)  HR: 62 (01 Aug 2022 09:00) (58 - 75)  BP: 104/60 (01 Aug 2022 09:00) (104/60 - 124/83)  BP(mean): --  ABP: --  ABP(mean): --  RR: 18 (01 Aug 2022 09:00) (18 - 18)  SpO2: 98% (01 Aug 2022 09:00) (96% - 99%) on room air     General: Awake. Alert. Cooperative. No distress. Appears stated age. Sitting in the chair  HEENT:  Right craniotomy incision stapled. Normocephalic. Anicteric. Normal oral mucosa. PERRL. EOMI.  Neck: Supple. Trachea midline. Thyroid without enlargement/tenderness/nodules. No carotid bruit. No JVD.	  Cardiovascular: Regular rate and rhythm. S1 S2 normal. No murmurs, rubs or gallops.  Respiratory: Respirations unlabored. Clear to auscultation and percussion bilaterally. No curvature.  Abdomen: Soft. Non-tender. Non-distended. No organomegaly. No masses. Normal bowel sounds.  Extremities: Warm to touch. No clubbing or cyanosis. No pedal edema.   Pulses: 2+ peripheral pulses all extremities.	  Skin: Craniotomy scar C/D/I. Abrasion on the forehead at the site of the EEG dressing  Lymph Nodes: Cervical, supraclavicular and axillary nodes normal  Neurological: Improved gait. A and O x 3  Psychiatry: Appropriate mood and affect.    LABS:                       12.8   19.00 )-----------( 258      ( 31 Jul 2022 07:34 )             38.5   CBC    WBC  19.00 <==, 21.01 <==, 23.88 <==, 21.83 <==, 15.37 <==, 16.53 <==    Hemoglobin  12.8 <<==, 12.4 <<==, 12.2 <<==, 13.4 <<==, 13.1 <<==, 13.3 <<==    Hematocrit  38.5 <==, 37.1 <==, 36.3 <==, 39.2 <==, 38.8 <==, 40.4 <==    Platelets  258 <==, 235 <==, 254 <==, 342 <==, 309 <==, 329 <==    141  |  102  |  42<H>  ----------------------------<  97    07-31  4.4   |  28  |  0.94    LYTES    sodium  141 <==, 139 <==, 140 <==, 138 <==, 142 <==, 144 <==    potassium   4.4 <==, 4.6 <==, 4.5 <==, 4.1 <==, 4.1 <==, 3.4 <==    chloride  102 <==, 102 <==, 104 <==, 103 <==, 110 <==, 105 <==    carbon dioxide  28 <==, 26 <==, 25 <==, 18 <==, 20 <==, 24 <==  =============================================================================================  RENAL FUNCTION:    Creatinine:   0.94  <<==, 0.85  <<==, 1.15  <<==, 0.93  <<==, 0.93  <<==, 1.18  <<==    BUN:   42 <==, 34 <==, 31 <==, 38 <==, 43 <==, 56 <==  ============================================================================================  calcium   9.2 <==, 9.9 <==, 9.1 <==, 9.1 <==, 9.1 <==, 9.5 <==    phos   4.2 <==, 4.2 <==    mag   1.9 <==, 1.8 <==  ============================================================================================  LFTs    AST:   14 <==     ALT:  21  <==     AP:  76  <=    Bili:  0.2  <=    PT/INR - ( 27 Jul 2022 07:17 )   PT: 11.6 sec;   INR: 1.00 ratio      PT/INR - ( 27 Jul 2022 07:17 )   PT: 11.6 sec;   INR: 1.00 ratio      ABG - ( 27 Jul 2022 13:13 )  pH: 7.39  /  pCO2: 32    /  pO2: 399   / HCO3: 19    / Base Excess: -4.7  /  SaO2: 100.0     < from: TTE with Doppler (w/Cont) (07.22.22 @ 10:09) >    Patient name: LANIE BERTRAND  YOB: 1940   Age: 82 (F)   MR#: [id number]  Study Date: 7/22/2022  Location: [address] [place], [place] [id number]: Yelena Louis DIMITRIS  Study quality: Technically difficult  Referring Physician: Mk Garza MD  BloodPressure: 116/72 mmHg  Height: 165 cm  Weight: 73 kg  BSA: 1.8 m2  ------------------------------------------------------------------------  PROCEDURE: Transthoracic echocardiogram with 2-D, M-Mode  and complete spectral and color flow Doppler.  INDICATION: Encounter for other preprocedural examination  (Z01.818)  ------------------------------------------------------------------------  Dimensions:    Normal Values:  LA:     3.4    2.0 - 4.0 cm  Ao:     3.1    2.0 - 3.8 cm  SEPTUM: 1.0    0.6 - 1.2 cm  PWT:    0.8    0.6 - 1.1 cm  LVIDd:  4.8    3.0 - 5.6 cm  LVIDs:  3.7    1.8 - 4.0 cm  Derived variables:  LVMI: 82 g/m2  RWT: 0.33  EF (Visual Estimate): 55 %  ------------------------------------------------------------------------  Observations:  Mitral Valve: Mitral annular calcification.  Aortic Valve/Aorta: Normal aortic valve.  Normal aortic root size.  Left Atrium: Normal left atrium.  Left Ventricle: Endocardial visualization enhanced with  intravenous injection of Ultrasonic Enhancing Agent  (Lumason).  Normal left ventricular internal dimensions with discrete  upper septal hypertrophy.  Estimated ejection fraction 55%, The inferolateral wall is  akinetic.  Impaired LV-relaxation with normal filling pressure.  Right Heart: Normal right atrium. Normal right ventricular  size and function.  Normal tricuspid valve.  Normal pulmonic valve. Mild pulmonic regurgitation.  Pericardium/Pleura: Normal pericardium with no pericardial  effusion.  Hemodynamic: Estimated right atrial pressure is normal.  No evidence of pulmonary hypertension.  ------------------------------------------------------------------------  Conclusions:  Endocardial visualization enhanced with intravenous  injection of Ultrasonic Enhancing Agent (Lumason).  Normal left ventricular internal dimensions with discrete  upper septal hypertrophy.  Estimated ejection fraction 55%, The inferolateral wall is  akinetic.  ------------------------------------------------------------------------  Confirmed on  7/22/2022 - 14:42:07 by David Vicente MD, FASE  ------------------------------------------------------------------------  ---------------------------------------------------------------------------------------------------------------  Cytopathology - Non Gyn Report (07.20.22 @ 16:10)   Cytopathology - Non Gyn Report:   ACCESSION No: [id number]   Patient: LANIE BERTRAND   Final Diagnosis   1. LUNG, LEFT UPPER LOBE, BRONCHOALVEOLAR LAVAGE   NEGATIVE FOR MALIGNANT CELLS.   Cytology slide and cell block revel ciliated bronchial cells.   2. LUNG, LEFT, ENDOBRONCHIAL BRUSH   POSITIVE FOR MALIGNANT CELLS.   Non small cell carcinoma favor adenocarcinoma   ---------------------------------------------------------------------------------------------------------------  Surgical Pathology Report (07.20.22 @ 11:53)   Surgical Pathology Report:   1. Left mainstem bronchus biopsy   Final Diagnosis   Bronchus, mainstem, left, biopsy:   - Poorly differentiated adenocarcinoma of lung origin, see comment.   Comment: Immunohistochemical stains: The neoplastic cells are positive   for CAM 5.2 and TTF-1 while negative for P40, CK20, chromogranin,   synaptophysin and CD56. Ki67 reveals a proliferative index of 40%.   In summary the morphology and immunophenotype are consistent with poorly   differentiated adenocarcinoma of lung origin.   Dr. Hopkins was notified of the diagnosis on 07/26/2022.   ---------------------------------------------------------------------------------------------------------------  MICROBIOLOGY:   COVID-19 PCR . (07.18.22 @ 23:25)   COVID-19 PCR: RF [facility]    Culture - Bronchial (07.20.22 @ 16:52)   Culture Results:   Normal Respiratory Tamy present     Culture - Acid Fast - Bronchial w/Smear (07.20.22 @ 16:52)   Specimen Source: .Bronchial Bronchial Lavage   Acid Fast Bacilli Smear:   No acid fast bacilli seen by fluorochrome stain     RADIOLOGY:  [x] Chest radiographs reviewed and interpreted by me    EXAM:  MR BRAIN WAW IC                          PROCEDURE DATE:  07/29/2022      IMPRESSION: Redemonstration of right-sided craniotomy changes status post   resection of a right temporal lobe mass with expected postoperative   changes. Residual hemorrhage and/or postoperative material within the  postoperative bed limits evaluation for enhancement.    Unchanged appearing ring-enhancing lesion in the left parafalcine   location for which a residual metastatic lesion is a possibility.    KATIE BARRERA MD; AttendingRadiologist  This document has been electronically signed. Jul 29 2022 12:24PM  ---------------------------------------------------------------------------------------------------------------     EXAM:  CT BRAIN                          PROCEDURE DATE:  07/28/2022      INTERPRETATION:  CT head without IV contrast    IMPRESSION:   RIGHT frontotemporal craniotomy with surgical cavity and   RIGHT temporal lobe. Mild pneumocephalus. Moderate residual edema is   noted but improved. There is improved effacement of RIGHT lateral   ventricle with 8 mm subfalcine herniation to the LEFT, improved from   prior.    ADRYAN BROWN MD; Attending Radiologist  This document has been electronically signed. Jul 28 2022 10:13AM  ---------------------------------------------------------------------------------------------------------------  EXAM:  CT ABDOMEN AND PELVIS IC                        EXAM:  CT CHEST IC                          PROCEDURE DATE:  07/18/2022      FINDINGS:  CHEST:  LUNGS AND LARGE AIRWAYS: There is a heterogeneous left upper lobe mass   approximately measuring 3.3 x 2.6 cm (2, 28) obstructing the left apical   posterior bronchus with partial atelectasis of the left upper lobe. Few   scattered bilateral pulmonary nodules measuring up to 3 mm in the right   upper lobe (2, 39).  PLEURA: No pleural effusion.  VESSELS: Atherosclerotic changes of the aorta and coronary arteries.  HEART: Heart size is normal. No pericardial effusion.  MEDIASTINUM AND SIXTO: Soft tissue contiguous with mass versus adenopathy   measuring 1.7 x 1.5 cm (2, 33).  CHEST WALL AND LOWER NECK: Bilateral subcentimeter hypoattenuating   thyroid nodules.    ABDOMEN AND PELVIS:  LIVER: Within normal limits.  BILE DUCTS: Normal caliber.  GALLBLADDER: Within normal limits.  SPLEEN: Within normal limits.  PANCREAS: Within normal limits.  ADRENALS: Indeterminant left adrenal nodule measuring 1.2 cm.  KIDNEYS/URETERS: No hydronephrosis. Renal cysts and subcentimeter   hypoattenuating foci bilaterally, too small to characterize.    BLADDER: Within normal limits.  REPRODUCTIVE ORGANS: Uterus and adnexa within normal limits.    BOWEL: Colonic diverticula withoutevidence of acute diverticulitis. No   bowel obstruction. Appendix is normal.  PERITONEUM: No ascites.  VESSELS: Atherosclerotic changes.  RETROPERITONEUM/LYMPH NODES: No lymphadenopathy.  ABDOMINAL WALL: Within normal limits.  BONES: Degenerative changes. Grade 1 anterolisthesis of L5 on S1. Status   post right shoulder arthroplasty.    IMPRESSION:  Left upper lobe lung mass with partial atelectasis of the left upper   lobe, concerning for primary lung neoplasm. Few scattered sub-4 mm   pulmonary nodules, indeterminate.    Indeterminant left adrenal nodule for which contrast enhanced MRI is   recommended for further evaluation.    DEEPAK HARRINGTON MD; Resident Radiologist  This document has been electronically signed.  HAYLEY DENTON MD; Attending Radiologist  This document has been electronically signed. Jul 19 2022  9:02AM  ---------------------------------------------------------------------------------------------------------------  EXAM:  CT BRAIN                          PROCEDURE DATE:  07/18/2022      FINDINGS:  A cystic versus centrally necrotic mass in the right temporal lobe   measures approximately 4.8 x 3.2 x 3.2 cm (602:15 and 601:33).    There moderate tolarge amount of vasogenic edema in the right frontal,   parietal and temporal lobes.  There is regional mass effect in the right   cerebral hemisphere with partial effacement of the right lateral   ventricle and third ventricle.  There is medial bulging of the right   thalamus across the midline by up to 1.4 cm (2:17).  There is   approximately 5 mm subfalcine subfalcine herniation of the right frontal   lobe underneath the cerebral falx (2:21).    There is an approximately 1.4 x 1.3 x 1.6 cm cystic versus centimeter   necrotic mass in the medial left frontal lobe.  There appears to be trace   edema surrounding this lesion, without significant mass effect.    There is no evidence of acute intracranial hemorrhage, transtentorial   herniation or acute territorial infarct.  Mild prominence the left   lateral ventricle may be related to pre-existing cerebral volume loss   rather than hydrocephalus.    The paranasal sinuses and mastoids are grossly clear.    The patient is status post cataractlens placement surgery bilaterally.    The calvarium and skull base appear within normal limits.    IMPRESSION:  Cystic versus a necrotic masses measure approximately 4.8 x 3.2 x 3.2 cm   in the right temporal lobe and 1.4 x 1.3 x 1.6 cm in the medial left   frontal lobe.    Moderate to large amount of vasogenic edema in the right frontal,   parietal and temporal lobes.  Trace edema in the left frontal lobe.    Regional mass effect in the right cerebral hemisphere with partial   effacement of theventricular system and 5 mm subfalcine herniation of   the right frontal lobe underneath the cerebral falx.    The findings may represent intracranial metastatic disease versus   glioblastoma.  Contrast-enhanced brain MRI is recommended for further   evaluation.      Findings discussed with DAVID Albarran by Dr. Harrington at 2153 hours on   7/8/2022 with readback confirmation.    DEEPAK HARRINGTON MD; Resident Radiologist  This document has been electronically signed.  PHILLY CISSE MD; Attending Radiologist  This document has been electronically signed. Jul 18 2022 10:15PM  ---------------------------------------------------------------------------------------------------------------  EXAM:  MR BRAIN WAW IC                          PROCEDURE DATE:  07/20/2022      FINDINGS:    Right temporal lobe peripherally enhancing, centrally necrotic 3.1 x 2.6   cm mass demonstrating mild diffusion restriction. There is surrounding   vasogenic edema. 8 mm leftward midline shift. Right uncal herniation.   Additional left parafalcine 1.3 x 0.9 cm rim-enhancing cystic lesion with   mild surrounding vasogenic edema. Tiny 0.3 x 0.3 cm rim-enhancing lesion   within the left frontal cortex (10:125).    Scattered foci of T2/FLAIR hyperintensity in the bilateral hemispheric   white matter are nonspecific but likely related to chronic white matter   microvascular ischemic disease. The left lateral ventricle is mildly   dilated. Flow voids of the major intracranial vessels at the skull base   follow expected course and contour.    The paranasal sinuses demonstrate no signal abnormality. The mastoids   demonstrate no signal abnormality.  Status post bilateral intraocular   lens implants.      IMPRESSION:  Right temporal lobe peripherally enhancing, centrally necrotic 3.1 x 2.6   cm mass demonstrating mild diffusion restriction. There is surrounding   vasogenic edema. 8 mm leftward midline shift. Right uncal herniation.   Additional left parafalcine 1.3 x 0.9 cm rim-enhancing cystic lesion with   mild surrounding vasogenic edema. Tiny 0.3 x 0.3 cm rim-enhancing lesion   within the left frontal cortex. Findings are concerning for metastatic   disease in the context of patient's known primary lung cancer. The left   lateral ventricle is mildly dilated.    NEIL DIOP MD; Resident Radiologist  This document has been electronically signed.  NERI BARRERA MD; Attending Radiologist  This document has been electronically signed. Jul 21 2022  4:26PM  ---------------------------------------------------------------------------------------------------------------  EXAM:  DUPLEX SCAN EXT VEINS LOWER BI                          PROCEDURE DATE:  07/28/2022      IMPRESSION:  No evidence of deep venous thrombosis in either lower extremity.    YENIFER QUAN MD; Attending Radiologist  This document has been electronically signed. Jul 28 2022  2:08PM  ---------------------------------------------------------------------------------------------------------------

## 2022-08-01 NOTE — PROGRESS NOTE ADULT - ASSESSMENT
ASSESSMENT:    82 year old gentlewoman, former smoker, without history of intrinsic lung disease. She has a history of HTN, HLD, DM, CAD s/p PCI (1990s and 2021) and PAD s/p RLE stenting. The patient has been having difficulty ambulating over the last several months due to a sense of imbalance with impaired gait. Her family has noted a change in her personality with pressured speech and insomnia. Outpatient cervical MRI incidentally note a CNS lesion. Lumbar spine MRI performed due to chronic back pain revealed multilevel foraminal stenosis/disc bulges. 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 The patient has no shortness of breath or hypoxemia on room air. She has no cough, sputum production, chest congestion or wheeze. No fevers, chills or sweats. No chest pain/pressure or palpitations.     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    MRI -> right temporal lobe peripherally enhancing, centrally necrotic 3.1 x 2.6 cm mass demonstrating mild diffusion restriction - there is surrounding vasogenic edema - 8 mm leftward midline shift right uncal herniation - additional left parafalcine 1.3 x 0.9 cm rim-enhancing cystic lesion with mild surrounding vasogenic edema - tiny 0.3 x 0.3 cm rim-enhancing lesion within the left frontal cortex - findings are concerning for metastatic disease - the left lateral ventricle is mildly dilated    BP control -> losartan/metoprolol/amlodipine    avoid full dose anticoagulation, ASA or plavix    prophylactic anticonvulsants -> depakote    decadron 3mg q12h po for cerebral edema -> taper continues    analgesics    incentive spirometry     advance diet as tolerated    GI/DVT prophylaxis - protonix/SQ lovenox  endocrinology evaluation noted    insulin regimen being adjusted for steroid induced hyperglycemia  radiation oncology, neurooncology, medical oncology evaluations   bowel regimen    Will follow with you. Plan of care discussed with the patient and her daughter-in-law by phone and with Crissy Garza and Ebonie. Discharge planning to acute rehab.    Satish Hopkins MD, [facility]  [phone]  Pulmonary Medicine

## 2022-08-01 NOTE — PROGRESS NOTE ADULT - ASSESSMENT
[facility] 6/23/21:   s/p successful angioplasty to the mid RCA followed by intracoronary brachytherapy.  [facility] 4/14/21: POBA of mid RCA (80%)   [facility] 3/25/21: PCI/POBA to pCX 90%   Echo 7/22/22: Normal left ventricular internal dimensions with discrete upper septal hypertrophy. Estimated ejection fraction 55%, The inferolateral wall is akinetic.      a/p  81 y/o female (former smoker) with PMHx of HTN, CAD sp PCi,  HLD, MI, peripheral neuropathy, DM and peripheral artery disease presented with abnl imaging on MRI as outpt      #Brain/ Lung  mass   -CT head/ CT chest noted   -work up hem/onc   -neurosx fu noted MRI brain showed 2 mets R temporal 4.7x3.7x3cm L frontal 1.4x1.5x1.2cm   -s/p bronchoscopy  -s/p Right craniotomy for tumor  -cv stable postop- management per neuro    #CAD sp PCI   -resume ASA given PCI hx once cleared by neurosx   -SP lung Bx ; sp craniotomy   -echo w inflat akinesis and overall preserved EF  -c/w BB     #HTN  -stable, c/w meds    dvt ppx   [facility] 6/23/21:   s/p successful angioplasty to the mid RCA followed by intracoronary brachytherapy.  [facility] 4/14/21: POBA of mid RCA (80%)   [facility] 3/25/21: PCI/POBA to pCX 90%   Echo 7/22/22: Normal left ventricular internal dimensions with discrete upper septal hypertrophy. Estimated ejection fraction 55%, The inferolateral wall is akinetic.      a/p  83 y/o female (former smoker) with PMHx of HTN, CAD sp PCi,  HLD, MI, peripheral neuropathy, DM and peripheral artery disease presented with abnl imaging on MRI as outpt      #Brain/ Lung  mass   -CT head/ CT chest noted   -work up hem/onc   -neurosx fu noted MRI brain showed 2 mets R temporal 4.7x3.7x3cm L frontal 1.4x1.5x1.2cm   -s/p bronchoscopy  -s/p Right craniotomy for tumor  -cv stable postop- management per neuro    #CAD sp PCI   -resume ASA given PCI hx once cleared by neurosx   -SP lung Bx ; sp craniotomy   -echo w inflat akinesis and overall preserved EF  -c/w BB     #HTN  -stable, c/w meds      ADDENDUM   this afternoon doe chacon reported pt had  acute onset of chest pain   on exam pt reported 8/10- radiating to bl ears   -ecg wtih st elevation to inferion and anterior leads  -trop , ck ckmb sent   -Cath lab consult called stat     d/w attending     loy ppx

## 2022-08-01 NOTE — PROGRESS NOTE ADULT - SUBJECTIVE AND OBJECTIVE BOX
HPI:  Patient is an 82 year old female that presented to ED with abnormal MRI reading (discovered by patient's orthopedic).  Has been having gait/balance issues since April.  Admitted to the neurosurgery service for further management.    OVERNIGHT EVENTS:  No acute events overnight.  Incisional pain well controlled.  Has been out of bed.  Tolerating diet.    Vital Signs Last 24 Hrs  T(C): 36.7 (01 Aug 2022 04:36), Max: 36.8 (31 Jul 2022 17:00)  T(F): 98.1 (01 Aug 2022 04:36), Max: 98.3 (31 Jul 2022 17:00)  HR: 62 (01 Aug 2022 09:00) (58 - 75)  BP: 104/60 (01 Aug 2022 09:00) (104/60 - 124/83)  BP(mean): --  RR: 18 (01 Aug 2022 09:00) (18 - 18)  SpO2: 98% (01 Aug 2022 09:00) (96% - 99%)    Parameters below as of 01 Aug 2022 09:00  Patient On (Oxygen Delivery Method): room air        PHYSICAL EXAM:  Neurological: awake, alert, oriented x3, follows commands, speech clear and fluent, perrl, eomi, face symmetric, tongue midline, no drift b/l, moves all extremities x4 w/ 5/5 strength throughout, sensation present, intact, equal throughout    Cardiovascular: +s1, s2  Respiratory: clear to auscultation b/l  Gastrointestinal: soft, non-distended, non-tender  Genitourinary: +voiding  Incision/Wound: right crani incision c/d/i w/ staples    TUBES/LINES:  [x] none    DIET:  [x] consistent carbohydrate      LABS:                        12.8   19.00 )-----------( 258      ( 31 Jul 2022 07:34 )             38.5     07-31    141  |  102  |  42<H>  ----------------------------<  97  4.4   |  28  |  0.94    Ca    9.2      31 Jul 2022 07:13              CAPILLARY BLOOD GLUCOSE      POCT Blood Glucose.: 218 mg/dL (01 Aug 2022 11:30)  POCT Blood Glucose.: 261 mg/dL (01 Aug 2022 07:43)  POCT Blood Glucose.: 199 mg/dL (31 Jul 2022 21:44)  POCT Blood Glucose.: 76 mg/dL (31 Jul 2022 21:02)  POCT Blood Glucose.: 144 mg/dL (31 Jul 2022 16:23)          Allergies    adhesives (Pruritus)  No Known Drug Allergies          MEDICATIONS:  Antibiotics:  none    Neuro:  acetaminophen     Tablet .. 650 milliGRAM(s) Oral every 6 hours PRN  diVALproex  milliGRAM(s) Oral every 8 hours  gabapentin 200 milliGRAM(s) Oral at bedtime  melatonin 5 milliGRAM(s) Oral at bedtime PRN  ondansetron Injectable 4 milliGRAM(s) IV Push every 6 hours PRN  oxyCODONE    IR 5 milliGRAM(s) Oral every 4 hours PRN  oxyCODONE    IR 10 milliGRAM(s) Oral every 4 hours PRN    Anticoagulation:  enoxaparin Injectable 40 milliGRAM(s) SubCutaneous <User Schedule>    OTHER:  amLODIPine   Tablet 5 milliGRAM(s) Oral daily  atorvastatin 40 milliGRAM(s) Oral at bedtime  bisacodyl 5 milliGRAM(s) Oral daily PRN  dexAMETHasone     Tablet 3 milliGRAM(s) Oral every 12 hours  dextrose 50% Injectable 12.5 Gram(s) IV Push once  dextrose 50% Injectable 25 Gram(s) IV Push once  dextrose 50% Injectable 25 Gram(s) IV Push once  dextrose Oral Gel 15 Gram(s) Oral once PRN  glucagon  Injectable 1 milliGRAM(s) IntraMuscular once  insulin glargine Injectable (LANTUS) 15 Unit(s) SubCutaneous at bedtime  insulin lispro (ADMELOG) corrective regimen sliding scale   SubCutaneous Before meals and at bedtime  insulin lispro Injectable (ADMELOG) 5 Unit(s) SubCutaneous before lunch  insulin lispro Injectable (ADMELOG) 7 Unit(s) SubCutaneous before dinner  losartan 100 milliGRAM(s) Oral daily  metoprolol tartrate 25 milliGRAM(s) Oral two times a day  pantoprazole    Tablet 40 milliGRAM(s) Oral before breakfast  polyethylene glycol 3350 17 Gram(s) Oral two times a day  senna 2 Tablet(s) Oral at bedtime    IVF:  dextrose 5%. 1000 milliLiter(s) IV Continuous <Continuous>  dextrose 5%. 1000 milliLiter(s) IV Continuous <Continuous>    CULTURES:  Culture Results:   No growth at 1 week. (07-20 @ 16:52)  Culture Results:   Normal Respiratory Tamy present (07-20 @ 16:52)    RADIOLOGY & ADDITIONAL TESTS:  no new imaging     HPI:  Patient is an 82 year old female that presented to ED with abnormal MRI reading (discovered by patient's orthopedic).  Has been having gait/balance issues since April.  Admitted to the neurosurgery service for further management.    OVERNIGHT EVENTS:  No acute events overnight.  Incisional pain well controlled.  Has been out of bed.  Tolerating diet.    Vital Signs Last 24 Hrs  T(C): 36.7 (01 Aug 2022 04:36), Max: 36.8 (31 Jul 2022 17:00)  T(F): 98.1 (01 Aug 2022 04:36), Max: 98.3 (31 Jul 2022 17:00)  HR: 62 (01 Aug 2022 09:00) (58 - 75)  BP: 104/60 (01 Aug 2022 09:00) (104/60 - 124/83)  BP(mean): --  RR: 18 (01 Aug 2022 09:00) (18 - 18)  SpO2: 98% (01 Aug 2022 09:00) (96% - 99%)    Parameters below as of 01 Aug 2022 09:00  Patient On (Oxygen Delivery Method): room air        PHYSICAL EXAM:  Neurological: awake, alert, oriented x3, follows commands, speech clear and fluent, perrl, eomi, face symmetric, tongue midline, no drift b/l, moves all extremities x4 w/ 5/5 strength throughout, sensation present, intact, equal throughout    Cardiovascular: +s1, s2  Respiratory: clear to auscultation b/l  Gastrointestinal: soft, non-distended, non-tender  Genitourinary: +voiding  Incision/Wound: right crani incision c/d/i w/ staples, forehead with horizontal pressure ulcer, no open areas or drainage, healing    TUBES/LINES:  [x] none    DIET:  [x] consistent carbohydrate      LABS:                        12.8   19.00 )-----------( 258      ( 31 Jul 2022 07:34 )             38.5     07-31    141  |  102  |  42<H>  ----------------------------<  97  4.4   |  28  |  0.94    Ca    9.2      31 Jul 2022 07:13              CAPILLARY BLOOD GLUCOSE      POCT Blood Glucose.: 218 mg/dL (01 Aug 2022 11:30)  POCT Blood Glucose.: 261 mg/dL (01 Aug 2022 07:43)  POCT Blood Glucose.: 199 mg/dL (31 Jul 2022 21:44)  POCT Blood Glucose.: 76 mg/dL (31 Jul 2022 21:02)  POCT Blood Glucose.: 144 mg/dL (31 Jul 2022 16:23)          Allergies    adhesives (Pruritus)  No Known Drug Allergies          MEDICATIONS:  Antibiotics:  none    Neuro:  acetaminophen     Tablet .. 650 milliGRAM(s) Oral every 6 hours PRN  diVALproex  milliGRAM(s) Oral every 8 hours  gabapentin 200 milliGRAM(s) Oral at bedtime  melatonin 5 milliGRAM(s) Oral at bedtime PRN  ondansetron Injectable 4 milliGRAM(s) IV Push every 6 hours PRN  oxyCODONE    IR 5 milliGRAM(s) Oral every 4 hours PRN  oxyCODONE    IR 10 milliGRAM(s) Oral every 4 hours PRN    Anticoagulation:  enoxaparin Injectable 40 milliGRAM(s) SubCutaneous <User Schedule>    OTHER:  amLODIPine   Tablet 5 milliGRAM(s) Oral daily  atorvastatin 40 milliGRAM(s) Oral at bedtime  bisacodyl 5 milliGRAM(s) Oral daily PRN  dexAMETHasone     Tablet 3 milliGRAM(s) Oral every 12 hours  dextrose 50% Injectable 12.5 Gram(s) IV Push once  dextrose 50% Injectable 25 Gram(s) IV Push once  dextrose 50% Injectable 25 Gram(s) IV Push once  dextrose Oral Gel 15 Gram(s) Oral once PRN  glucagon  Injectable 1 milliGRAM(s) IntraMuscular once  insulin glargine Injectable (LANTUS) 15 Unit(s) SubCutaneous at bedtime  insulin lispro (ADMELOG) corrective regimen sliding scale   SubCutaneous Before meals and at bedtime  insulin lispro Injectable (ADMELOG) 5 Unit(s) SubCutaneous before lunch  insulin lispro Injectable (ADMELOG) 7 Unit(s) SubCutaneous before dinner  losartan 100 milliGRAM(s) Oral daily  metoprolol tartrate 25 milliGRAM(s) Oral two times a day  pantoprazole    Tablet 40 milliGRAM(s) Oral before breakfast  polyethylene glycol 3350 17 Gram(s) Oral two times a day  senna 2 Tablet(s) Oral at bedtime    IVF:  dextrose 5%. 1000 milliLiter(s) IV Continuous <Continuous>  dextrose 5%. 1000 milliLiter(s) IV Continuous <Continuous>    CULTURES:  Culture Results:   No growth at 1 week. (07-20 @ 16:52)  Culture Results:   Normal Respiratory Tamy present (07-20 @ 16:52)    RADIOLOGY & ADDITIONAL TESTS:  no new imaging

## 2022-08-01 NOTE — PROGRESS NOTE ADULT - ASSESSMENT
81 y/o female (former smoker) with PMHx of HTN, CAD, HLD, MI, peripheral neuropathy, DM and peripheral artery disease presented to the ED for imaging for her head due to abnormal spine MRI by her orthopedist (Dr. Castro) during workup for her spine for her chronic back problems. The orthopedist noted something in her brain. Otherwise pt has no symptoms, stated that since 04/2022 she has had gait/balance issues. Endorsed that if she sits for a while and gets up, she feels like she is "drunk" and feels off. Denied chest pain, SOB, fevers, chills, numbness/paresthesias, muscular weakness, dizziness, abdominal pain, headaches.    brain mass  - likely metastatic lung cancer  -  mri done  - decadron  - keppra  - seen by NS  - s/p resection of brain mass  - post op care as per NS    lung mass  - pulm following  - bronch was done  -  path adeno ca  - follow up with onc    diabetes  - hold oral hypoglycemics  - insulin ss  - hgb a1c  8.7   - elevated sugars are expected  - endo consult following    HTN  -  cont home meds    cad  - s/p stent  - hold asa and plavix for poss brain surgery    constipation  - on senna and Miralax  - consider dulcolax or enema    dvt px  - scd's

## 2022-08-01 NOTE — PROGRESS NOTE ADULT - ASSESSMENT
HPI:  Patient is an 82 year old female that presented to ED with abnormal MRI reading (discovered by patient's orthopedic).  Has been having gait/balance issues since April.  Admitted to the neurosurgery service for further management.    PROCEDURE: s/p right craniotomy for resection of brain mass on 7/27/2022   POD#5    PLAN:  Neuro:   -q4 hour neuro checks  -oxycodone for pain control  -gabapentin for neuropathic pain  -decadron 3q12, taper to off over 1 week  -depakote for seizure prophylaxis  -out of bed with assistance  -pt/ot - acute rehab upon discharge    Respiratory:   -satting well on room air  -incentive spirometer for lung expansion    CV:  -keep sbp 100-140  -losartan, metoprolol, and norvasc for bp control  -atorvastatin for lipid control    Endocrine:   -lantus, lispro, and SALAS for glucose control  -keep fingersticks 100-180  -endocrine following    Heme/Onc:   -lovenox and scds for dvt prophylaxis     Renal:   -voiding    ID:   -afebrile    GI:   -consistent carbohydrate diet  -senna, dulcolax, and miralax for bowel regimen  -protonix for gi prophylaxis      discussed w/ Dr. Ebonie Johnston #[phone]

## 2022-08-01 NOTE — PROGRESS NOTE ADULT - SUBJECTIVE AND OBJECTIVE BOX
DATE OF SERVICE: 08-01-22 @ 14:38    Patient is a 82y old  Female who presents with a chief complaint of abnormal imaging (01 Aug 2022 12:44)      SUBJECTIVE / OVERNIGHT EVENTS:  No chest pain. No shortness of breath. No BM for days    MEDICATIONS  (STANDING):  amLODIPine   Tablet 5 milliGRAM(s) Oral daily  atorvastatin 40 milliGRAM(s) Oral at bedtime  dexAMETHasone     Tablet 3 milliGRAM(s) Oral every 12 hours  dextrose 5%. 1000 milliLiter(s) (50 mL/Hr) IV Continuous <Continuous>  dextrose 5%. 1000 milliLiter(s) (100 mL/Hr) IV Continuous <Continuous>  dextrose 50% Injectable 25 Gram(s) IV Push once  dextrose 50% Injectable 12.5 Gram(s) IV Push once  dextrose 50% Injectable 25 Gram(s) IV Push once  diVALproex  milliGRAM(s) Oral every 8 hours  enoxaparin Injectable 40 milliGRAM(s) SubCutaneous <User Schedule>  gabapentin 200 milliGRAM(s) Oral at bedtime  glucagon  Injectable 1 milliGRAM(s) IntraMuscular once  insulin glargine Injectable (LANTUS) 15 Unit(s) SubCutaneous at bedtime  insulin lispro (ADMELOG) corrective regimen sliding scale   SubCutaneous Before meals and at bedtime  insulin lispro Injectable (ADMELOG) 5 Unit(s) SubCutaneous before lunch  insulin lispro Injectable (ADMELOG) 7 Unit(s) SubCutaneous before dinner  losartan 100 milliGRAM(s) Oral daily  metoprolol tartrate 25 milliGRAM(s) Oral two times a day  pantoprazole    Tablet 40 milliGRAM(s) Oral before breakfast  polyethylene glycol 3350 17 Gram(s) Oral two times a day  senna 2 Tablet(s) Oral at bedtime    MEDICATIONS  (PRN):  acetaminophen     Tablet .. 650 milliGRAM(s) Oral every 6 hours PRN Temp greater or equal to 38C (100.4F), Mild Pain (1 - 3)  bisacodyl 5 milliGRAM(s) Oral daily PRN Constipation  dextrose Oral Gel 15 Gram(s) Oral once PRN Blood Glucose LESS THAN 70 milliGRAM(s)/deciliter  melatonin 5 milliGRAM(s) Oral at bedtime PRN Sleep  ondansetron Injectable 4 milliGRAM(s) IV Push every 6 hours PRN Nausea and/or Vomiting  oxyCODONE    IR 5 milliGRAM(s) Oral every 4 hours PRN Moderate Pain (4 - 6)  oxyCODONE    IR 10 milliGRAM(s) Oral every 4 hours PRN Severe Pain (7 - 10)      Vital Signs Last 24 Hrs  T(C): 36.7 (01 Aug 2022 04:36), Max: 36.8 (31 Jul 2022 17:00)  T(F): 98.1 (01 Aug 2022 04:36), Max: 98.3 (31 Jul 2022 17:00)  HR: 62 (01 Aug 2022 09:00) (58 - 75)  BP: 104/60 (01 Aug 2022 09:00) (104/60 - 124/83)  BP(mean): --  RR: 18 (01 Aug 2022 09:00) (18 - 18)  SpO2: 98% (01 Aug 2022 09:00) (96% - 99%)    Parameters below as of 01 Aug 2022 09:00  Patient On (Oxygen Delivery Method): room air      CAPILLARY BLOOD GLUCOSE      POCT Blood Glucose.: 218 mg/dL (01 Aug 2022 11:30)  POCT Blood Glucose.: 261 mg/dL (01 Aug 2022 07:43)  POCT Blood Glucose.: 199 mg/dL (31 Jul 2022 21:44)  POCT Blood Glucose.: 76 mg/dL (31 Jul 2022 21:02)  POCT Blood Glucose.: 144 mg/dL (31 Jul 2022 16:23)    I&O's Summary      PHYSICAL EXAM:  GENERAL: NAD, well-developed  HEAD:  Atraumatic, Normocephalic  EYES: EOMI, PERRLA, conjunctiva and sclera clear  NECK: Supple, No JVD  CHEST/LUNG: Clear to auscultation bilaterally; No wheeze  HEART: Regular rate and rhythm; No murmurs, rubs, or gallops  ABDOMEN: Soft, Nontender, Nondistended; Bowel sounds present  EXTREMITIES:  2+ Peripheral Pulses, No clubbing, cyanosis, or edema  PSYCH: AAOx3  NEUROLOGY: non-focal  SKIN: No rashes or lesions    LABS:                        12.8   19.00 )-----------( 258      ( 31 Jul 2022 07:34 )             38.5     07-31    141  |  102  |  42<H>  ----------------------------<  97  4.4   |  28  |  0.94    Ca    9.2      31 Jul 2022 07:13                RADIOLOGY & ADDITIONAL TESTS:    Imaging Personally Reviewed:    Consultant(s) Notes Reviewed:      Care Discussed with Consultants/Other Providers:

## 2022-08-01 NOTE — DISCHARGE NOTE NURSING/CASE MANAGEMENT/SOCIAL WORK - NSDCFUADDAPPT_GEN_ALL_CORE_FT
follow up with neurosurgery, Dr. Loomis, within 1 week of discharge from rehab, call [phone] follow up with hematology, Dr. Alexander, within 1 week of discharge from rehab, call [phone]. Needs outpatient PET scan & plans for systemic therapy & radiation  follow up with radiation oncology, Dr. Funes, within 1 week of discharge from rehab, call [phone] follow up with cardiology, Dr. Alcaraz, within 1 week of discharge from rehab, call [phone] follow up with PMD within 1 week of discharge from rehab

## 2022-08-01 NOTE — PROGRESS NOTE ADULT - SUBJECTIVE AND OBJECTIVE BOX
Patient is a 82y old  Female who presents with a chief complaint of abnormal imaging (01 Aug 2022 14:38)    appetite good. No HA or dizziness. No CP or SOB    Medication:   acetaminophen     Tablet .. 650 milliGRAM(s) Oral every 6 hours PRN  amLODIPine   Tablet 5 milliGRAM(s) Oral daily  atorvastatin 40 milliGRAM(s) Oral at bedtime  bisacodyl 5 milliGRAM(s) Oral daily PRN  dexAMETHasone     Tablet 3 milliGRAM(s) Oral every 12 hours  dextrose 5%. 1000 milliLiter(s) IV Continuous <Continuous>  dextrose 5%. 1000 milliLiter(s) IV Continuous <Continuous>  dextrose 50% Injectable 25 Gram(s) IV Push once  dextrose 50% Injectable 12.5 Gram(s) IV Push once  dextrose 50% Injectable 25 Gram(s) IV Push once  dextrose Oral Gel 15 Gram(s) Oral once PRN  diVALproex  milliGRAM(s) Oral every 8 hours  gabapentin 200 milliGRAM(s) Oral at bedtime  glucagon  Injectable 1 milliGRAM(s) IntraMuscular once  heparin  Infusion.  Unit(s)/Hr IV Continuous <Continuous>  insulin glargine Injectable (LANTUS) 15 Unit(s) SubCutaneous at bedtime  insulin lispro (ADMELOG) corrective regimen sliding scale   SubCutaneous Before meals and at bedtime  insulin lispro Injectable (ADMELOG) 5 Unit(s) SubCutaneous before lunch  insulin lispro Injectable (ADMELOG) 7 Unit(s) SubCutaneous before dinner  losartan 100 milliGRAM(s) Oral daily  melatonin 5 milliGRAM(s) Oral at bedtime PRN  metoprolol tartrate 25 milliGRAM(s) Oral two times a day  ondansetron Injectable 4 milliGRAM(s) IV Push every 6 hours PRN  oxyCODONE    IR 5 milliGRAM(s) Oral every 4 hours PRN  oxyCODONE    IR 10 milliGRAM(s) Oral every 4 hours PRN  pantoprazole    Tablet 40 milliGRAM(s) Oral before breakfast  polyethylene glycol 3350 17 Gram(s) Oral two times a day  senna 2 Tablet(s) Oral at bedtime      Physical exam    T(C): 36.7 (08-01-22 @ 04:36), Max: 36.7 (07-31-22 @ 22:48)  HR: 90 (08-01-22 @ 17:41) (58 - 90)  BP: 163/79 (08-01-22 @ 17:41) (104/60 - 169/84)  RR: 18 (08-01-22 @ 16:00) (18 - 18)  SpO2: 95% (08-01-22 @ 16:00) (95% - 99%)  Wt(kg): --    alert NAD  EOMI anicteric sclera  Cv s1 S2 RRR  Lungs clear B/L  abd soft NT ND +BS  No LE edema or tenderness    Labs                        12.8   19.00 )-----------( 258      ( 31 Jul 2022 07:34 )             38.5       07-31    141  |  102  |  42<H>  ----------------------------<  97  4.4   |  28  |  0.94    Ca    9.2      31 Jul 2022 07:13            [phone]

## 2022-08-02 LAB
ALBUMIN SERPL ELPH-MCNC: 2.9 G/DL — LOW (ref 3.3–5)
ALP SERPL-CCNC: 63 U/L — SIGNIFICANT CHANGE UP (ref 40–120)
ALT FLD-CCNC: 14 U/L — SIGNIFICANT CHANGE UP (ref 10–45)
ANION GAP SERPL CALC-SCNC: 11 MMOL/L — SIGNIFICANT CHANGE UP (ref 5–17)
APTT BLD: 58.6 SEC — HIGH (ref 27.5–35.5)
APTT BLD: 62 SEC — HIGH (ref 27.5–35.5)
APTT BLD: 92.3 SEC — HIGH (ref 27.5–35.5)
AST SERPL-CCNC: 53 U/L — HIGH (ref 10–40)
BASOPHILS # BLD AUTO: 0.05 K/UL — SIGNIFICANT CHANGE UP (ref 0–0.2)
BASOPHILS NFR BLD AUTO: 0.3 % — SIGNIFICANT CHANGE UP (ref 0–2)
BILIRUB SERPL-MCNC: 0.2 MG/DL — SIGNIFICANT CHANGE UP (ref 0.2–1.2)
BUN SERPL-MCNC: 60 MG/DL — HIGH (ref 7–23)
CALCIUM SERPL-MCNC: 8.8 MG/DL — SIGNIFICANT CHANGE UP (ref 8.4–10.5)
CHLORIDE SERPL-SCNC: 102 MMOL/L — SIGNIFICANT CHANGE UP (ref 96–108)
CHOLEST SERPL-MCNC: 136 MG/DL — SIGNIFICANT CHANGE UP
CK MB BLD-MCNC: 13.6 % — HIGH (ref 0–3.5)
CK MB BLD-MCNC: 14.8 % — HIGH (ref 0–3.5)
CK MB BLD-MCNC: 15.5 % — HIGH (ref 0–3.5)
CK MB CFR SERPL CALC: 57.1 NG/ML — HIGH (ref 0–3.8)
CK MB CFR SERPL CALC: 63.2 NG/ML — HIGH (ref 0–3.8)
CK MB CFR SERPL CALC: 72.7 NG/ML — HIGH (ref 0–3.8)
CK SERPL-CCNC: 386 U/L — HIGH (ref 25–170)
CK SERPL-CCNC: 466 U/L — HIGH (ref 25–170)
CK SERPL-CCNC: 470 U/L — HIGH (ref 25–170)
CO2 SERPL-SCNC: 28 MMOL/L — SIGNIFICANT CHANGE UP (ref 22–31)
CREAT SERPL-MCNC: 1.23 MG/DL — SIGNIFICANT CHANGE UP (ref 0.5–1.3)
EGFR: 44 ML/MIN/1.73M2 — LOW
EOSINOPHIL # BLD AUTO: 0.06 K/UL — SIGNIFICANT CHANGE UP (ref 0–0.5)
EOSINOPHIL NFR BLD AUTO: 0.3 % — SIGNIFICANT CHANGE UP (ref 0–6)
GLUCOSE BLDC GLUCOMTR-MCNC: 130 MG/DL — HIGH (ref 70–99)
GLUCOSE BLDC GLUCOMTR-MCNC: 152 MG/DL — HIGH (ref 70–99)
GLUCOSE BLDC GLUCOMTR-MCNC: 156 MG/DL — HIGH (ref 70–99)
GLUCOSE BLDC GLUCOMTR-MCNC: 255 MG/DL — HIGH (ref 70–99)
GLUCOSE BLDC GLUCOMTR-MCNC: 318 MG/DL — HIGH (ref 70–99)
GLUCOSE SERPL-MCNC: 145 MG/DL — HIGH (ref 70–99)
HCT VFR BLD CALC: 40.5 % — SIGNIFICANT CHANGE UP (ref 34.5–45)
HCT VFR BLD CALC: 40.7 % — SIGNIFICANT CHANGE UP (ref 34.5–45)
HDLC SERPL-MCNC: 59 MG/DL — SIGNIFICANT CHANGE UP
HGB BLD-MCNC: 13.3 G/DL — SIGNIFICANT CHANGE UP (ref 11.5–15.5)
HGB BLD-MCNC: 13.4 G/DL — SIGNIFICANT CHANGE UP (ref 11.5–15.5)
IMM GRANULOCYTES NFR BLD AUTO: 2.2 % — HIGH (ref 0–1.5)
INR BLD: 0.98 RATIO — SIGNIFICANT CHANGE UP (ref 0.88–1.16)
LACTATE SERPL-SCNC: 1.4 MMOL/L — SIGNIFICANT CHANGE UP (ref 0.7–2)
LIPID PNL WITH DIRECT LDL SERPL: 63 MG/DL — SIGNIFICANT CHANGE UP
LYMPHOCYTES # BLD AUTO: 17.8 % — SIGNIFICANT CHANGE UP (ref 13–44)
LYMPHOCYTES # BLD AUTO: 3.09 K/UL — SIGNIFICANT CHANGE UP (ref 1–3.3)
MAGNESIUM SERPL-MCNC: 1.7 MG/DL — SIGNIFICANT CHANGE UP (ref 1.6–2.6)
MCHC RBC-ENTMCNC: 30.8 PG — SIGNIFICANT CHANGE UP (ref 27–34)
MCHC RBC-ENTMCNC: 30.9 PG — SIGNIFICANT CHANGE UP (ref 27–34)
MCHC RBC-ENTMCNC: 32.7 GM/DL — SIGNIFICANT CHANGE UP (ref 32–36)
MCHC RBC-ENTMCNC: 33.1 GM/DL — SIGNIFICANT CHANGE UP (ref 32–36)
MCV RBC AUTO: 93.3 FL — SIGNIFICANT CHANGE UP (ref 80–100)
MCV RBC AUTO: 94.2 FL — SIGNIFICANT CHANGE UP (ref 80–100)
MONOCYTES # BLD AUTO: 1.23 K/UL — HIGH (ref 0–0.9)
MONOCYTES NFR BLD AUTO: 7.1 % — SIGNIFICANT CHANGE UP (ref 2–14)
NEUTROPHILS # BLD AUTO: 12.56 K/UL — HIGH (ref 1.8–7.4)
NEUTROPHILS NFR BLD AUTO: 72.3 % — SIGNIFICANT CHANGE UP (ref 43–77)
NON HDL CHOLESTEROL: 77 MG/DL — SIGNIFICANT CHANGE UP
NRBC # BLD: 0 /100 WBCS — SIGNIFICANT CHANGE UP (ref 0–0)
NRBC # BLD: 0 /100 WBCS — SIGNIFICANT CHANGE UP (ref 0–0)
PHOSPHATE SERPL-MCNC: 3.3 MG/DL — SIGNIFICANT CHANGE UP (ref 2.5–4.5)
PLATELET # BLD AUTO: 267 K/UL — SIGNIFICANT CHANGE UP (ref 150–400)
PLATELET # BLD AUTO: 303 K/UL — SIGNIFICANT CHANGE UP (ref 150–400)
POTASSIUM SERPL-MCNC: 4.5 MMOL/L — SIGNIFICANT CHANGE UP (ref 3.5–5.3)
POTASSIUM SERPL-SCNC: 4.5 MMOL/L — SIGNIFICANT CHANGE UP (ref 3.5–5.3)
PROT SERPL-MCNC: 5.6 G/DL — LOW (ref 6–8.3)
PROTHROM AB SERPL-ACNC: 11.4 SEC — SIGNIFICANT CHANGE UP (ref 10.5–13.4)
RBC # BLD: 4.32 M/UL — SIGNIFICANT CHANGE UP (ref 3.8–5.2)
RBC # BLD: 4.34 M/UL — SIGNIFICANT CHANGE UP (ref 3.8–5.2)
RBC # FLD: 12.2 % — SIGNIFICANT CHANGE UP (ref 10.3–14.5)
RBC # FLD: 12.4 % — SIGNIFICANT CHANGE UP (ref 10.3–14.5)
SODIUM SERPL-SCNC: 141 MMOL/L — SIGNIFICANT CHANGE UP (ref 135–145)
T4 AB SER-ACNC: 6.3 UG/DL — SIGNIFICANT CHANGE UP (ref 4.6–12)
TRIGL SERPL-MCNC: 73 MG/DL — SIGNIFICANT CHANGE UP
TROPONIN T, HIGH SENSITIVITY RESULT: 622 NG/L — HIGH (ref 0–51)
TROPONIN T, HIGH SENSITIVITY RESULT: 948 NG/L — HIGH (ref 0–51)
TROPONIN T, HIGH SENSITIVITY RESULT: 961 NG/L — HIGH (ref 0–51)
WBC # BLD: 16.24 K/UL — HIGH (ref 3.8–10.5)
WBC # BLD: 17.37 K/UL — HIGH (ref 3.8–10.5)
WBC # FLD AUTO: 16.24 K/UL — HIGH (ref 3.8–10.5)
WBC # FLD AUTO: 17.37 K/UL — HIGH (ref 3.8–10.5)

## 2022-08-02 PROCEDURE — 93010 ELECTROCARDIOGRAM REPORT: CPT

## 2022-08-02 PROCEDURE — 93308 TTE F-UP OR LMTD: CPT | Mod: 26

## 2022-08-02 PROCEDURE — 93321 DOPPLER ECHO F-UP/LMTD STD: CPT | Mod: 26

## 2022-08-02 PROCEDURE — 70450 CT HEAD/BRAIN W/O DYE: CPT | Mod: 26,76

## 2022-08-02 PROCEDURE — 99291 CRITICAL CARE FIRST HOUR: CPT | Mod: 25

## 2022-08-02 PROCEDURE — 99292 CRITICAL CARE ADDL 30 MIN: CPT

## 2022-08-02 PROCEDURE — 93010 ELECTROCARDIOGRAM REPORT: CPT | Mod: 77

## 2022-08-02 RX ORDER — METOCLOPRAMIDE HCL 10 MG
10 TABLET ORAL ONCE
Refills: 0 | Status: COMPLETED | OUTPATIENT
Start: 2022-08-02 | End: 2022-08-02

## 2022-08-02 RX ORDER — INSULIN GLARGINE 100 [IU]/ML
5 INJECTION, SOLUTION SUBCUTANEOUS ONCE
Refills: 0 | Status: COMPLETED | OUTPATIENT
Start: 2022-08-02 | End: 2022-08-02

## 2022-08-02 RX ORDER — DEXAMETHASONE 0.5 MG/5ML
2 ELIXIR ORAL EVERY 12 HOURS
Refills: 0 | Status: DISCONTINUED | OUTPATIENT
Start: 2022-08-02 | End: 2022-08-03

## 2022-08-02 RX ORDER — MAGNESIUM SULFATE 500 MG/ML
2 VIAL (ML) INJECTION
Refills: 0 | Status: COMPLETED | OUTPATIENT
Start: 2022-08-02 | End: 2022-08-02

## 2022-08-02 RX ORDER — INSULIN GLARGINE 100 [IU]/ML
20 INJECTION, SOLUTION SUBCUTANEOUS AT BEDTIME
Refills: 0 | Status: DISCONTINUED | OUTPATIENT
Start: 2022-08-03 | End: 2022-08-04

## 2022-08-02 RX ORDER — METOPROLOL TARTRATE 50 MG
12.5 TABLET ORAL
Refills: 0 | Status: DISCONTINUED | OUTPATIENT
Start: 2022-08-02 | End: 2022-08-04

## 2022-08-02 RX ORDER — SODIUM CHLORIDE 9 MG/ML
250 INJECTION INTRAMUSCULAR; INTRAVENOUS; SUBCUTANEOUS ONCE
Refills: 0 | Status: COMPLETED | OUTPATIENT
Start: 2022-08-02 | End: 2022-08-02

## 2022-08-02 RX ADMIN — LOSARTAN POTASSIUM 100 MILLIGRAM(S): 100 TABLET, FILM COATED ORAL at 05:22

## 2022-08-02 RX ADMIN — Medication 7 UNIT(S): at 18:16

## 2022-08-02 RX ADMIN — DIVALPROEX SODIUM 500 MILLIGRAM(S): 500 TABLET, DELAYED RELEASE ORAL at 22:25

## 2022-08-02 RX ADMIN — Medication 25 GRAM(S): at 05:22

## 2022-08-02 RX ADMIN — SENNA PLUS 2 TABLET(S): 8.6 TABLET ORAL at 22:25

## 2022-08-02 RX ADMIN — GABAPENTIN 200 MILLIGRAM(S): 400 CAPSULE ORAL at 22:25

## 2022-08-02 RX ADMIN — Medication 12.5 MILLIGRAM(S): at 18:15

## 2022-08-02 RX ADMIN — INSULIN GLARGINE 15 UNIT(S): 100 INJECTION, SOLUTION SUBCUTANEOUS at 22:28

## 2022-08-02 RX ADMIN — PANTOPRAZOLE SODIUM 40 MILLIGRAM(S): 20 TABLET, DELAYED RELEASE ORAL at 05:23

## 2022-08-02 RX ADMIN — POLYETHYLENE GLYCOL 3350 17 GRAM(S): 17 POWDER, FOR SOLUTION ORAL at 05:22

## 2022-08-02 RX ADMIN — CARVEDILOL PHOSPHATE 6.25 MILLIGRAM(S): 80 CAPSULE, EXTENDED RELEASE ORAL at 05:23

## 2022-08-02 RX ADMIN — DIVALPROEX SODIUM 500 MILLIGRAM(S): 500 TABLET, DELAYED RELEASE ORAL at 15:37

## 2022-08-02 RX ADMIN — Medication 3 MILLIGRAM(S): at 05:22

## 2022-08-02 RX ADMIN — Medication 25 GRAM(S): at 08:25

## 2022-08-02 RX ADMIN — Medication 2: at 08:28

## 2022-08-02 RX ADMIN — HEPARIN SODIUM 8.5 UNIT(S)/HR: 5000 INJECTION INTRAVENOUS; SUBCUTANEOUS at 06:00

## 2022-08-02 RX ADMIN — SODIUM CHLORIDE 250 MILLILITER(S): 9 INJECTION INTRAMUSCULAR; INTRAVENOUS; SUBCUTANEOUS at 09:15

## 2022-08-02 RX ADMIN — Medication 2 MILLIGRAM(S): at 18:15

## 2022-08-02 RX ADMIN — INSULIN GLARGINE 5 UNIT(S): 100 INJECTION, SOLUTION SUBCUTANEOUS at 23:21

## 2022-08-02 RX ADMIN — Medication 81 MILLIGRAM(S): at 15:37

## 2022-08-02 RX ADMIN — DIVALPROEX SODIUM 500 MILLIGRAM(S): 500 TABLET, DELAYED RELEASE ORAL at 05:23

## 2022-08-02 RX ADMIN — Medication 8: at 22:29

## 2022-08-02 RX ADMIN — POLYETHYLENE GLYCOL 3350 17 GRAM(S): 17 POWDER, FOR SOLUTION ORAL at 18:43

## 2022-08-02 RX ADMIN — Medication 7 UNIT(S): at 08:29

## 2022-08-02 RX ADMIN — ATORVASTATIN CALCIUM 80 MILLIGRAM(S): 80 TABLET, FILM COATED ORAL at 22:25

## 2022-08-02 RX ADMIN — Medication 6: at 18:16

## 2022-08-02 RX ADMIN — Medication 10 MILLIGRAM(S): at 11:05

## 2022-08-02 NOTE — PROGRESS NOTE ADULT - ASSESSMENT
[facility] 6/23/21:   s/p successful angioplasty to the mid RCA followed by intracoronary brachytherapy.  [facility] 4/14/21: POBA of mid RCA (80%)   [facility] 3/25/21: PCI/POBA to pCX 90%   Echo 7/22/22: Normal left ventricular internal dimensions with discrete upper septal hypertrophy. Estimated ejection fraction 55%, The inferolateral wall is akinetic.  Echo 8/2/22:  grossly, preserved left ventricular systolic function with segmental wall motion abnormalities. EF approximately 55%. The mid to distal inferolateral and mid to distal inferior segments are hypkinetic. The apex is akinetic. No left ventricular thrombus seen.      a/p  83 y/o female (former smoker) with PMHx of HTN, CAD sp PCi,  HLD, MI, peripheral neuropathy, DM and peripheral artery disease presented with abnl imaging on MRI as outpt      #Brain/ Lung  mass   -CT head/ CT chest noted   -work up hem/onc   -neurosx fu noted MRI brain showed 2 mets R temporal 4.7x3.7x3cm L frontal 1.4x1.5x1.2cm   -s/p bronchoscopy  -s/p Right craniotomy for tumor  -management per neuro-- started on antiplt/ hep as mentioned below- CT head fu     #STEMI   -8/1 co cp-  found to have an inferior STEMI  -per cath lab  attending -Not a PCI candidate given potential benefits would be outweighed by the risks of the burden of anticoagulation and antiplatelet agents required for therapy  due to recent brain surgery  -now in  CICU for medical management  -started on asa,  hep gtt   -limited echo grossly, preserved left ventricular systolic function with segmental wall motion abnormalities. EF approximately 55%. The mid to distal inferolateral and mid to distal inferior segments are hypkinetic. The apex is akinetic. No left ventricular thrombus seen.  -c/w statin   -ccu fu   -monitor ct head - neuro sx following     #CAD sp PCI   -antiplt/ac as mentioned above  -echo w inflat akinesis and overall preserved EF  -repeat limited echo as mentioned above   -c/w BB     #HTN  -stable, c/w meds    dvt ppx

## 2022-08-02 NOTE — CHART NOTE - NSCHARTNOTEFT_GEN_A_CORE
CCU Transfer Note    Transfer from: CCU    Transfer to: (  ) Medicine    (  ) Telemetry    (  ) RCU                               (  ) Palliative    (  ) Stroke Unit    (  ) MICU    (  ) Other:    Accepting Physician:    Signout given to:     HPI / CCU COURSE:    81 y/o female (former smoker) with PMHx of HTN, CAD, HLD, MI, peripheral neuropathy, DM and peripheral artery disease presented to the ED for imaging for her head due to abnormal spine MRI by her orthopedist (Dr. Castro) during workup for her spine for her chronic back problems. The orthopedist noted something in her brain. Otherwise pt has no symptoms, stated that since 04/2022 she has had gait/balance issues. Endorsed that if she sits for a while and gets up, she feels like she is "drunk" and feels off. Denied chest pain, SOB, fevers, chills, numbness/paresthesias, muscular weakness, dizziness, abdominal pain, headaches.    MR head significant for R temporal lobe lesion with edema, midline shift and uncal herniation concerning for metastatic disease. 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 s/p craniotomy for resection of brain mass 7/27. ASA/Plavix was held in preparation for craniotomy. 8/1 patient began to complain of 8/10 CP, EKG with STEMI in multiple leads. Patient ASA loaded and restarted on heparin gtt.        Vital Signs Last 24 Hrs  T(C): 36.8 (02 Aug 2022 08:00), Max: 36.9 (01 Aug 2022 19:50)  T(F): 98.2 (02 Aug 2022 08:00), Max: 98.4 (01 Aug 2022 19:50)  HR: 63 (02 Aug 2022 15:00) (59 - 95)  BP: 97/51 (02 Aug 2022 15:00) (77/48 - 177/77)  BP(mean): 70 (02 Aug 2022 15:00) (58 - 123)  RR: 18 (02 Aug 2022 15:00) (16 - 27)  SpO2: 93% (02 Aug 2022 13:00) (93% - 100%)    Parameters below as of 02 Aug 2022 11:00  Patient On (Oxygen Delivery Method): room air        I&O's Summary    01 Aug 2022 07:01  -  02 Aug 2022 07:00  --------------------------------------------------------  IN: 310 mL / OUT: 0 mL / NET: 310 mL    02 Aug 2022 07:01  -  02 Aug 2022 16:50  --------------------------------------------------------  IN: 368 mL / OUT: 1100 mL / NET: -732 mL        PHYSICAL EXAMINATION:  General: Comfortable, no acute distress, cooperative with exam.  Respiratory: CTAB, normal respiratory effort, no coughing, wheezes, crackles, or rales.  CV: RRR, S1/S2, no murmurs, rubs or gallops. No JVD. Distal pulses intact.  Abdominal: Soft, nontender, nondistended, no rebound or guarding, normal bowel sounds.  Neurology: AOx3, no focal neuro defects, DE LA GARZA x 4.  Extremities: No pitting edema, + Peripheral pulses.  Incisions:   Tubes:    LABS:   CARDIAC MARKERS ( 02 Aug 2022 11:09 )  x     / x     / 470 U/L / x     / 72.7 ng/mL  CARDIAC MARKERS ( 02 Aug 2022 04:07 )  x     / x     / 386 U/L / x     / 57.1 ng/mL  CARDIAC MARKERS ( 01 Aug 2022 20:29 )  x     / x     / 171 U/L / x     / 19.7 ng/mL  CARDIAC MARKERS ( 01 Aug 2022 15:26 )  x     / x     / 78 U/L / x     / 2.0 ng/mL                              13.3   16.24 )-----------( 267      ( 02 Aug 2022 11:09 )             40.7       08-02    141  |  102  |  60<H>  ----------------------------<  145<H>  4.5   |  28  |  1.23    Ca    8.8      02 Aug 2022 04:07  Phos  3.3     08-02  Mg     1.7     08-02    TPro  5.6<L>  /  Alb  2.9<L>  /  TBili  0.2  /  DBili  x   /  AST  53<H>  /  ALT  14  /  AlkPhos  63  08-02      PT/INR - ( 02 Aug 2022 04:07 )   PT: 11.4 sec;   INR: 0.98 ratio         PTT - ( 02 Aug 2022 11:09 )  PTT:58.6 sec          ECG: QTc 415    TELEMETRY: 3 beats NSVT at 5:30am    IMAGING:     < from: CT Head No Cont (08.02.22 @ 14:58) >    IMPRESSION:    No significant interval change from 8/2/2022 study obtained at 10:43 AM.    --- End of Report ---    < end of copied text >      < from: CT Head No Cont (08.02.22 @ 10:55) >    IMPRESSION:    No significant interval change from 8/2/2022 study obtained at 4:51 AM    --- End of Report ---    < end of copied text >      < from: CT Head No Cont (08.02.22 @ 05:17) >    IMPRESSION:    No significant interval change from 8/1/2022.    < end of copied text >          ASSESSMENT & PLAN:   ====================  Assessment & Plan:   Patient is an 82 year old female that presented to ED with abnormal MRI reading (discovered by patient's orthopedic), also with gait/balance issues since April, found to have R temporal brain mass c/f metastatic brain lesion now s/p craniotomy 7/27. While pending surgery, AC held, and on 8/1 presented with CP found to have STEMI on EKG. ASA loaded and started on hep gtt 8/1 and transferred to CCU for further management.    ====================  NEUROLOGY    #Brain lesion  - s/p craniotomy for resection of brain mass 7/27, path showing NSC carcinoma (favor adeno)  - c/w depakote  - c/w dexamethasone  - repeat CTH 1 hour post AC resumption unremarkable  - patient increasingly somnolent this AM, repeat CTH x2 unchanged from prior, improved later in day  > Will ctm, NSGY aware  - goal -160     #Peripheral neuropathy  - c/w gabapentin    ====================  CARDIOVASCULAR    #CAD s/p PCI  #STEMI  - TTE 7/22 with normal LV internal dimensions with discrete upper septal hypertrophy. EF 55%, inferolateral wall is akinetic.  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  - no indication for pulmonary medications or antibiotics  - Pulm following, appreciate recs    - satting well on RA  - incentive mario    ====================  GI    - CC Diet  - pantoprazole 40 qD  - senna, dulcolax    ====================  ENDOCRINE    #DM  - c/w Lantus 15 qhs, Admelog 7 before breakfast and dinner, Admelog 5 before lunch  - moderate dose correctional scale  - Endo following, appreciate recs     ====================  RENAL    - Retaining - straight cath prn  - Monitor UOP, lytes    ====================  HEMATOLOGY/ONCOLOGY    #Leukocytosis  - iso dexamethasone, no s/s infection  - ctm    #DVT PPx  - c/w ASA, heparin gtt    ====================  INFECTIOUS DISEASES    - No active issues    ====================    Betty Reed MD  Internal Medicine Resident.       FOR FOLLOW UP:  [ ] SBP goal 100-160  [ ]   [ ] CCU Transfer Note    Transfer from: CCU    Transfer to: (  ) Medicine    (  ) Telemetry    (  ) RCU                               (  ) Palliative    (  ) Stroke Unit    (  ) MICU    ( X ) Other: NSICU    Accepting Physician:    Signout given to:     HPI / CCU COURSE:    83 y/o female (former smoker) with PMHx of HTN, CAD, HLD, MI, peripheral neuropathy, DM and peripheral artery disease presented to the ED for imaging for her head due to abnormal spine MRI by her orthopedist (Dr. Castro) during workup for her spine for her chronic back problems. The orthopedist noted something in her brain. Otherwise pt has no symptoms, stated that since 04/2022 she has had gait/balance issues. Endorsed that if she sits for a while and gets up, she feels like she is "drunk" and feels off. Denied chest pain, SOB, fevers, chills, numbness/paresthesias, muscular weakness, dizziness, abdominal pain, headaches.    MR head significant for R temporal lobe lesion with edema, midline shift and uncal herniation concerning for metastatic disease. 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 s/p craniotomy for resection of brain mass 7/27. ASA/Plavix was held in preparation for craniotomy. 8/1 patient began to complain of 8/10 CP, EKG with STEMI in multiple leads. Patient ASA loaded and restarted on heparin gtt.        Vital Signs Last 24 Hrs  T(C): 36.8 (02 Aug 2022 08:00), Max: 36.9 (01 Aug 2022 19:50)  T(F): 98.2 (02 Aug 2022 08:00), Max: 98.4 (01 Aug 2022 19:50)  HR: 63 (02 Aug 2022 15:00) (59 - 95)  BP: 97/51 (02 Aug 2022 15:00) (77/48 - 177/77)  BP(mean): 70 (02 Aug 2022 15:00) (58 - 123)  RR: 18 (02 Aug 2022 15:00) (16 - 27)  SpO2: 93% (02 Aug 2022 13:00) (93% - 100%)    Parameters below as of 02 Aug 2022 11:00  Patient On (Oxygen Delivery Method): room air        I&O's Summary    01 Aug 2022 07:01  -  02 Aug 2022 07:00  --------------------------------------------------------  IN: 310 mL / OUT: 0 mL / NET: 310 mL    02 Aug 2022 07:01  -  02 Aug 2022 16:50  --------------------------------------------------------  IN: 368 mL / OUT: 1100 mL / NET: -732 mL        PHYSICAL EXAMINATION:  General: Comfortable, no acute distress, cooperative with exam.  Respiratory: CTAB, normal respiratory effort, no coughing, wheezes, crackles, or rales.  CV: RRR, S1/S2, no murmurs, rubs or gallops. No JVD. Distal pulses intact.  Abdominal: Soft, nontender, nondistended, no rebound or guarding, normal bowel sounds.  Neurology: AOx3, no focal neuro defects, DE LA GARZA x 4.  Extremities: No pitting edema, + Peripheral pulses.  Incisions:   Tubes:    LABS:   CARDIAC MARKERS ( 02 Aug 2022 11:09 )  x     / x     / 470 U/L / x     / 72.7 ng/mL  CARDIAC MARKERS ( 02 Aug 2022 04:07 )  x     / x     / 386 U/L / x     / 57.1 ng/mL  CARDIAC MARKERS ( 01 Aug 2022 20:29 )  x     / x     / 171 U/L / x     / 19.7 ng/mL  CARDIAC MARKERS ( 01 Aug 2022 15:26 )  x     / x     / 78 U/L / x     / 2.0 ng/mL                              13.3   16.24 )-----------( 267      ( 02 Aug 2022 11:09 )             40.7       08-02    141  |  102  |  60<H>  ----------------------------<  145<H>  4.5   |  28  |  1.23    Ca    8.8      02 Aug 2022 04:07  Phos  3.3     08-02  Mg     1.7     08-02    TPro  5.6<L>  /  Alb  2.9<L>  /  TBili  0.2  /  DBili  x   /  AST  53<H>  /  ALT  14  /  AlkPhos  63  08-02      PT/INR - ( 02 Aug 2022 04:07 )   PT: 11.4 sec;   INR: 0.98 ratio         PTT - ( 02 Aug 2022 11:09 )  PTT:58.6 sec          ECG: QTc 415    TELEMETRY: 3 beats NSVT at 5:30am    IMAGING:     < from: CT Head No Cont (08.02.22 @ 14:58) >    IMPRESSION:    No significant interval change from 8/2/2022 study obtained at 10:43 AM.    --- End of Report ---    < end of copied text >      < from: CT Head No Cont (08.02.22 @ 10:55) >    IMPRESSION:    No significant interval change from 8/2/2022 study obtained at 4:51 AM    --- End of Report ---    < end of copied text >      < from: CT Head No Cont (08.02.22 @ 05:17) >    IMPRESSION:    No significant interval change from 8/1/2022.    < end of copied text >          ASSESSMENT & PLAN:   ====================  Assessment & Plan:   Patient is an 82 year old female that presented to ED with abnormal MRI reading (discovered by patient's orthopedic), also with gait/balance issues since April, found to have R temporal brain mass c/f metastatic brain lesion now s/p craniotomy 7/27. While pending surgery, AC held, and on 8/1 presented with CP found to have STEMI on EKG. ASA loaded and started on hep gtt 8/1 and transferred to CCU for further management.    ====================  NEUROLOGY    #Brain lesion  - s/p craniotomy for resection of brain mass 7/27, path showing NSC carcinoma (favor adeno)  - c/w depakote  - c/w dexamethasone  - repeat CTH 1 hour post AC resumption unremarkable  - patient increasingly somnolent this AM, repeat CTH x2 unchanged from prior, improved later in day  > Will ctm, NSGY aware  - goal -160     #Peripheral neuropathy  - c/w gabapentin    ====================  CARDIOVASCULAR    #CAD s/p PCI  #STEMI  - TTE 7/22 with normal LV internal dimensions with discrete upper septal hypertrophy. EF 55%, inferolateral wall is akinetic.  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  - no indication for pulmonary medications or antibiotics  - Pulm following, appreciate recs    - satting well on RA  - incentive mario    ====================  GI    - CC Diet  - pantoprazole 40 qD  - senna, dulcolax    ====================  ENDOCRINE    #DM  - c/w Lantus 15 qhs, Admelog 7 before breakfast and dinner, Admelog 5 before lunch  - moderate dose correctional scale  - Endo following, appreciate recs     ====================  RENAL    - Retaining - straight cath prn  - Monitor UOP, lytes    ====================  HEMATOLOGY/ONCOLOGY    #Leukocytosis  - iso dexamethasone, no s/s infection  - ctm    #DVT PPx  - c/w ASA, heparin gtt    ====================  INFECTIOUS DISEASES    - No active issues    ====================    Betty Reed MD  Internal Medicine Resident.       FOR FOLLOW UP:  [ ] SBP goal 100-160  [ ]   [ ] CCU Transfer Note    Transfer from: CCU    Transfer to: (  ) Medicine    (  ) Telemetry    (  ) RCU                               (  ) Palliative    (  ) Stroke Unit    (  ) MICU    ( X ) Other: NSICU    Accepting Physician:    Signout given to:     HPI / CCU COURSE:    81 y/o female (former smoker) with PMHx of HTN, CAD, HLD, MI, peripheral neuropathy, DM and peripheral artery disease presented to the ED for imaging for her head due to abnormal spine MRI by her orthopedist (Dr. Castro) during workup for her spine for her chronic back problems. The orthopedist noted something in her brain. Otherwise pt has no symptoms, stated that since 04/2022 she has had gait/balance issues. Endorsed that if she sits for a while and gets up, she feels like she is "drunk" and feels off. Denied chest pain, SOB, fevers, chills, numbness/paresthesias, muscular weakness, dizziness, abdominal pain, headaches.    MR head significant for R temporal lobe lesion with edema, midline shift and uncal herniation concerning for metastatic disease. 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 s/p craniotomy for resection of brain mass 7/27. ASA/Plavix was held in preparation for craniotomy. 8/1 patient began to complain of 8/10 CP, EKG with STEMI in multiple leads. Patient ASA loaded and restarted on heparin gtt.        Vital Signs Last 24 Hrs  T(C): 36.8 (02 Aug 2022 08:00), Max: 36.9 (01 Aug 2022 19:50)  T(F): 98.2 (02 Aug 2022 08:00), Max: 98.4 (01 Aug 2022 19:50)  HR: 63 (02 Aug 2022 15:00) (59 - 95)  BP: 97/51 (02 Aug 2022 15:00) (77/48 - 177/77)  BP(mean): 70 (02 Aug 2022 15:00) (58 - 123)  RR: 18 (02 Aug 2022 15:00) (16 - 27)  SpO2: 93% (02 Aug 2022 13:00) (93% - 100%)    Parameters below as of 02 Aug 2022 11:00  Patient On (Oxygen Delivery Method): room air        I&O's Summary    01 Aug 2022 07:01  -  02 Aug 2022 07:00  --------------------------------------------------------  IN: 310 mL / OUT: 0 mL / NET: 310 mL    02 Aug 2022 07:01  -  02 Aug 2022 16:50  --------------------------------------------------------  IN: 368 mL / OUT: 1100 mL / NET: -732 mL        PHYSICAL EXAMINATION:  General: Comfortable, no acute distress, cooperative with exam.  Respiratory: CTAB, normal respiratory effort, no coughing, wheezes, crackles, or rales.  CV: RRR, S1/S2, no murmurs, rubs or gallops. No JVD. Distal pulses intact.  Abdominal: Soft, nontender, nondistended, no rebound or guarding, normal bowel sounds.  Neurology: AOx3, no focal neuro defects, DE LA GARZA x 4.  Extremities: No pitting edema, + Peripheral pulses.  Incisions:   Tubes:    LABS:   CARDIAC MARKERS ( 02 Aug 2022 11:09 )  x     / x     / 470 U/L / x     / 72.7 ng/mL  CARDIAC MARKERS ( 02 Aug 2022 04:07 )  x     / x     / 386 U/L / x     / 57.1 ng/mL  CARDIAC MARKERS ( 01 Aug 2022 20:29 )  x     / x     / 171 U/L / x     / 19.7 ng/mL  CARDIAC MARKERS ( 01 Aug 2022 15:26 )  x     / x     / 78 U/L / x     / 2.0 ng/mL                              13.3   16.24 )-----------( 267      ( 02 Aug 2022 11:09 )             40.7       08-02    141  |  102  |  60<H>  ----------------------------<  145<H>  4.5   |  28  |  1.23    Ca    8.8      02 Aug 2022 04:07  Phos  3.3     08-02  Mg     1.7     08-02    TPro  5.6<L>  /  Alb  2.9<L>  /  TBili  0.2  /  DBili  x   /  AST  53<H>  /  ALT  14  /  AlkPhos  63  08-02      PT/INR - ( 02 Aug 2022 04:07 )   PT: 11.4 sec;   INR: 0.98 ratio         PTT - ( 02 Aug 2022 11:09 )  PTT:58.6 sec          ECG: QTc 415    TELEMETRY: 3 beats NSVT at 5:30am    IMAGING:     < from: CT Head No Cont (08.02.22 @ 14:58) >    IMPRESSION:    No significant interval change from 8/2/2022 study obtained at 10:43 AM.    --- End of Report ---    < end of copied text >      < from: CT Head No Cont (08.02.22 @ 10:55) >    IMPRESSION:    No significant interval change from 8/2/2022 study obtained at 4:51 AM    --- End of Report ---    < end of copied text >      < from: CT Head No Cont (08.02.22 @ 05:17) >    IMPRESSION:    No significant interval change from 8/1/2022.    < end of copied text >          ASSESSMENT & PLAN:   ====================  Assessment & Plan:   Patient is an 82 year old female that presented to ED with abnormal MRI reading (discovered by patient's orthopedic), also with gait/balance issues since April, found to have R temporal brain mass c/f metastatic brain lesion now s/p craniotomy 7/27. While pending surgery, AC held, and on 8/1 presented with CP found to have STEMI on EKG. ASA loaded and started on hep gtt 8/1 and transferred to CCU for further management.    ====================  NEUROLOGY    #AMS  - patient increasingly somnolent 8/2 AM, follows commands intermittently  - responds briefly to sternal rub and painful stimuli   - Repeat CTH x5 unchanged from prior  - UA+, started on CTX  > Will ctm, NSGY aware    #Brain lesion  - s/p craniotomy for resection of brain mass 7/27, path showing NSC carcinoma (favor adeno)  - c/w depakote  - c/w dexamethasone  - repeat CTH 1 hour post AC resumption unremarkable  - goal -160     #Peripheral neuropathy  - c/w gabapentin    ====================  CARDIOVASCULAR    #CAD s/p PCI  #STEMI  - TTE 7/22 with normal LV internal dimensions with discrete upper septal hypertrophy. EF 55%, inferolateral wall is akinetic.  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  - no indication for pulmonary medications or antibiotics  - Pulm following, appreciate recs    - Pt tachypneic, borderline HoTN 8/2 c/f PE, CTA chest obtained to r/o PE  - F/u CTA chest  - satting well on RA  - incentive mario    ====================  GI    - CC Diet  - pantoprazole 40 qD  - senna, dulcolax    ====================  ENDOCRINE    #DM  - c/w Lantus 15 qhs, Admelog 7 before breakfast and dinner, Admelog 5 before lunch  - moderate dose correctional scale  - Endo following, appreciate recs     ====================  RENAL    - Retaining - straight cath prn  - Monitor UOP, lytes    ====================  HEMATOLOGY/ONCOLOGY    #Leukocytosis  - iso dexamethasone, however now with +UA    #Metastatic lung cancer  - Onc following, recs appreciated  - Path +NSCC    #DVT PPx  - c/w ASA, heparin gtt    ====================  INFECTIOUS DISEASES    - UA +LE  - started on CTX    ====================    Betty Reed MD  Internal Medicine Resident.        FOR FOLLOW UP:  [ ] SBP goal 100-160  [ ] f/u CT PE  [ ] CCU Transfer Note    Transfer from: CCU    Transfer to: (  ) Medicine    (  ) Telemetry    (  ) RCU                               (  ) Palliative    (  ) Stroke Unit    (  ) MICU    ( X ) Other: NSICU    Accepting Physician: Dr. Loomis    Signout given to: Maddy Tyler NP    HPI / CCU COURSE:    83 y/o female (former smoker) with PMHx of HTN, CAD, HLD, MI, peripheral neuropathy, DM and peripheral artery disease presented to the ED for imaging for her head due to abnormal spine MRI by her orthopedist (Dr. Castro) during workup for her spine for her chronic back problems. The orthopedist noted something in her brain. Otherwise pt has no symptoms, stated that since 04/2022 she has had gait/balance issues. Endorsed that if she sits for a while and gets up, she feels like she is "drunk" and feels off. Denied chest pain, SOB, fevers, chills, numbness/paresthesias, muscular weakness, dizziness, abdominal pain, headaches.    MR head significant for R temporal lobe lesion with edema, midline shift and uncal herniation concerning for metastatic disease. 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 s/p craniotomy for resection of brain mass 7/27. ASA/Plavix was held in preparation for craniotomy. 8/1 patient began to complain of 8/10 CP, EKG with STEMI in multiple leads. Patient ASA loaded and restarted on heparin gtt.        Vital Signs Last 24 Hrs  T(C): 36.8 (02 Aug 2022 08:00), Max: 36.9 (01 Aug 2022 19:50)  T(F): 98.2 (02 Aug 2022 08:00), Max: 98.4 (01 Aug 2022 19:50)  HR: 63 (02 Aug 2022 15:00) (59 - 95)  BP: 97/51 (02 Aug 2022 15:00) (77/48 - 177/77)  BP(mean): 70 (02 Aug 2022 15:00) (58 - 123)  RR: 18 (02 Aug 2022 15:00) (16 - 27)  SpO2: 93% (02 Aug 2022 13:00) (93% - 100%)    Parameters below as of 02 Aug 2022 11:00  Patient On (Oxygen Delivery Method): room air        I&O's Summary    01 Aug 2022 07:01  -  02 Aug 2022 07:00  --------------------------------------------------------  IN: 310 mL / OUT: 0 mL / NET: 310 mL    02 Aug 2022 07:01  -  02 Aug 2022 16:50  --------------------------------------------------------  IN: 368 mL / OUT: 1100 mL / NET: -732 mL        PHYSICAL EXAMINATION:  General: Comfortable, no acute distress, cooperative with exam.  Respiratory: CTAB, normal respiratory effort, no coughing, wheezes, crackles, or rales.  CV: RRR, S1/S2, no murmurs, rubs or gallops. No JVD. Distal pulses intact.  Abdominal: Soft, nontender, nondistended, no rebound or guarding, normal bowel sounds.  Neurology: AOx3, no focal neuro defects, DE LA GARZA x 4.  Extremities: No pitting edema, + Peripheral pulses.  Incisions:   Tubes:    LABS:   CARDIAC MARKERS ( 02 Aug 2022 11:09 )  x     / x     / 470 U/L / x     / 72.7 ng/mL  CARDIAC MARKERS ( 02 Aug 2022 04:07 )  x     / x     / 386 U/L / x     / 57.1 ng/mL  CARDIAC MARKERS ( 01 Aug 2022 20:29 )  x     / x     / 171 U/L / x     / 19.7 ng/mL  CARDIAC MARKERS ( 01 Aug 2022 15:26 )  x     / x     / 78 U/L / x     / 2.0 ng/mL                              13.3   16.24 )-----------( 267      ( 02 Aug 2022 11:09 )             40.7       08-02    141  |  102  |  60<H>  ----------------------------<  145<H>  4.5   |  28  |  1.23    Ca    8.8      02 Aug 2022 04:07  Phos  3.3     08-02  Mg     1.7     08-02    TPro  5.6<L>  /  Alb  2.9<L>  /  TBili  0.2  /  DBili  x   /  AST  53<H>  /  ALT  14  /  AlkPhos  63  08-02      PT/INR - ( 02 Aug 2022 04:07 )   PT: 11.4 sec;   INR: 0.98 ratio         PTT - ( 02 Aug 2022 11:09 )  PTT:58.6 sec          ECG: QTc 415    TELEMETRY: 3 beats NSVT at 5:30am    IMAGING:     < from: CT Head No Cont (08.02.22 @ 14:58) >    IMPRESSION:    No significant interval change from 8/2/2022 study obtained at 10:43 AM.    --- End of Report ---    < end of copied text >      < from: CT Head No Cont (08.02.22 @ 10:55) >    IMPRESSION:    No significant interval change from 8/2/2022 study obtained at 4:51 AM    --- End of Report ---    < end of copied text >      < from: CT Head No Cont (08.02.22 @ 05:17) >    IMPRESSION:    No significant interval change from 8/1/2022.    < end of copied text >          ASSESSMENT & PLAN:   ====================  Assessment & Plan:   Patient is an 82 year old female that presented to ED with abnormal MRI reading (discovered by patient's orthopedic), also with gait/balance issues since April, found to have R temporal brain mass c/f metastatic brain lesion now s/p craniotomy 7/27. While pending surgery, AC held, and on 8/1 presented with CP found to have STEMI on EKG. ASA loaded and started on hep gtt 8/1 and transferred to CCU for further management.    ====================  NEUROLOGY    #AMS  - patient increasingly somnolent 8/2 AM, follows commands intermittently  - responds briefly to sternal rub and painful stimuli   - Repeat CTH x5 unchanged from prior  - UA+, started on CTX  > Will ctm, NSGY aware    #Brain lesion  - s/p craniotomy for resection of brain mass 7/27, path showing NSC carcinoma (favor adeno)  - c/w depakote  - c/w dexamethasone  - repeat CTH 1 hour post AC resumption unremarkable  - goal -160     #Peripheral neuropathy  - c/w gabapentin    ====================  CARDIOVASCULAR    #CAD s/p PCI  #STEMI  - TTE 7/22 with normal LV internal dimensions with discrete upper septal hypertrophy. EF 55%, inferolateral wall is akinetic.  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  - no indication for pulmonary medications or antibiotics  - Pulm following, appreciate recs    - Pt tachypneic, borderline HoTN 8/2 c/f PE, CTA chest obtained to r/o PE  - F/u CTA chest  - satting well on RA  - incentive mario    ====================  GI    - CC Diet  - pantoprazole 40 qD  - senna, dulcolax    ====================  ENDOCRINE    #DM  - c/w Lantus 15 qhs, Admelog 7 before breakfast and dinner, Admelog 5 before lunch  - moderate dose correctional scale  - Endo following, appreciate recs     ====================  RENAL    - Retaining - straight cath prn  - Monitor UOP, lytes    ====================  HEMATOLOGY/ONCOLOGY    #Leukocytosis  - iso dexamethasone, however now with +UA    #Metastatic lung cancer  - Onc following, recs appreciated  - Path +NSCC    #DVT PPx  - c/w ASA, heparin gtt    ====================  INFECTIOUS DISEASES    - UA +LE  - started on CTX    ====================    Betty Reed MD  Internal Medicine Resident.        FOR FOLLOW UP:  [ ] SBP goal 100-160  [ ] f/u CT PE  [ ]

## 2022-08-02 NOTE — PROGRESS NOTE ADULT - ASSESSMENT
====================  Assessment & Plan:   Patient is an 82 year old female that presented to ED with abnormal MRI reading (discovered by patient's orthopedic), also with gait/balance issues since April, found to have R temporal brain mass c/f metastatic brain lesion now s/p craniotomy 7/27. While pending surgery, AC held, and on 8/1 presented with CP found to have STEMI on EKG. ASA loaded and started on hep gtt 8/1 and transferred to CCU for further management.    ====================  NEUROLOGY    #Brain lesion  - s/p craniotomy for resection of brain mass 7/27, path showing NSC carcinoma (favor adeno)  - c/w depakote  - c/w dexamethasone  - repeat CTH 1 hour post AC resumption unremarkable  - patient increasingly somnolent this AM, follows commands intermittently, and repeat CTH unchanged from prior  > Will ctm, NSGY aware  - goal -160     #Peripheral neuropathy  - c/w gabapentin    ====================  CARDIOVASCULAR    #CAD s/p PCI  #STEMI  - TTE 7/22 with normal LV internal dimensions with discrete upper septal hypertrophy. EF 55%, inferolateral wall is akinetic.  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  - no indication for pulmonary medications or antibiotics  - Pulm following, appreciate recs    - satting well on RA  - incentive mario    ====================  GI    - CC Diet  - pantoprazole 40 qD  - senna, dulcolax    ====================  ENDOCRINE    #DM  - c/w Lantus 15 qhs, Admelog 7 before breakfast and dinner, Admelog 5 before lunch  - moderate dose correctional scale  - Endo following, appreciate recs     ====================  RENAL    - Retaining - straight cath prn  - Monitor UOP, lytes    ====================  HEMATOLOGY/ONCOLOGY    #Leukocytosis  - iso dexamethasone, no s/s infection  - ctm    #DVT PPx  - c/w ASA, heparin gtt    ====================  INFECTIOUS DISEASES    - No active issues    ====================    Betty Reed MD  Internal Medicine Resident.

## 2022-08-02 NOTE — PROGRESS NOTE ADULT - SUBJECTIVE AND OBJECTIVE BOX
LANIE BERTRAND  MRN-[id number]    Patient is a 82y old  Female who presents with a chief complaint of abnormal imaging (02 Aug 2022 02:39)      Review of System  REVIEW OF SYSTEMS      General:	Denies fatigue, fevers, chills, sweats, decreased appetite.    Skin/Breast: denies pruritis, rash  	  Ophthalmologic: no change in vision or blurring  	  HEENT	Denies dry mouth, oral sores, dysphagia,  change in hearing.    Respiratory and Thorax:  cough, sob, wheeze, hemoptysis  	  Cardiovascular:	no cp , palp, orthopnea    Gastrointestinal:	no n/v/d constipation    Genitourinary:	no dysuria of frequency, no hematuria, no flank pain    Musculoskeletal:	no bone or joint pain. no muscle aches.     Neurological:	no change in sensory or motor function. no headache. no weakness.     Psychiatric:	no depression, no anxiety, insomnia.     Hematology/Lymphatics:	no bleeding or bruising        Current Meds  MEDICATIONS  (STANDING):  aspirin enteric coated 81 milliGRAM(s) Oral daily  atorvastatin 80 milliGRAM(s) Oral at bedtime  carvedilol 6.25 milliGRAM(s) Oral every 12 hours  dexAMETHasone     Tablet 2 milliGRAM(s) Oral every 12 hours  diVALproex  milliGRAM(s) Oral every 8 hours  gabapentin 200 milliGRAM(s) Oral at bedtime  heparin  Infusion 1050 Unit(s)/Hr (8.5 mL/Hr) IV Continuous <Continuous>  insulin glargine Injectable (LANTUS) 15 Unit(s) SubCutaneous at bedtime  insulin lispro (ADMELOG) corrective regimen sliding scale   SubCutaneous Before meals and at bedtime  insulin lispro Injectable (ADMELOG) 5 Unit(s) SubCutaneous before lunch  insulin lispro Injectable (ADMELOG) 7 Unit(s) SubCutaneous before breakfast  insulin lispro Injectable (ADMELOG) 7 Unit(s) SubCutaneous before dinner  losartan 100 milliGRAM(s) Oral daily  magnesium sulfate  IVPB 2 Gram(s) IV Intermittent every 2 hours  pantoprazole    Tablet 40 milliGRAM(s) Oral before breakfast  polyethylene glycol 3350 17 Gram(s) Oral two times a day  senna 2 Tablet(s) Oral at bedtime    MEDICATIONS  (PRN):  bisacodyl 5 milliGRAM(s) Oral daily PRN Constipation  melatonin 5 milliGRAM(s) Oral at bedtime PRN Sleep      Vitals  Vital Signs Last 24 Hrs  T(C): 36.8 (02 Aug 2022 03:00), Max: 36.9 (01 Aug 2022 19:50)  T(F): 98.2 (02 Aug 2022 03:00), Max: 98.4 (01 Aug 2022 19:50)  HR: 72 (02 Aug 2022 06:00) (62 - 95)  BP: 102/54 (02 Aug 2022 06:00) (102/54 - 177/77)  BP(mean): 74 (02 Aug 2022 06:00) (74 - 123)  RR: 24 (02 Aug 2022 06:00) (18 - 27)  SpO2: 100% (02 Aug 2022 06:00) (95% - 100%)    Parameters below as of 02 Aug 2022 06:00  Patient On (Oxygen Delivery Method): nasal cannula  O2 Flow (L/min): 2      Physical Exam  PHYSICAL EXAM:      Constitutional: NAD    Eyes: PERRLA EOMI, anicteric sclera    Heent :No oral sores, no pharyngeal injection. moist mucosa.    Neck: supple, no jvd, no LAD    Respiratory: CTA b/l     Cardiovascular: s1s2, no m/g/r    Gastrointestinal: soft, nt, nd, + BS    Extremities: no c/c/e    Neurological:A&O x 3 moves all ext.    Skin: no rash on exposed skin    Lymph Nodes: no lymphadenopathy.              Lab  CBC Full  -  ( 02 Aug 2022 04:07 )  WBC Count : 17.37 K/uL  RBC Count : 4.34 M/uL  Hemoglobin : 13.4 g/dL  Hematocrit : 40.5 %  Platelet Count - Automated : 303 K/uL  Mean Cell Volume : 93.3 fl  Mean Cell Hemoglobin : 30.9 pg  Mean Cell Hemoglobin Concentration : 33.1 gm/dL  Auto Neutrophil # : 12.56 K/uL  Auto Lymphocyte # : 3.09 K/uL  Auto Monocyte # : 1.23 K/uL  Auto Eosinophil # : 0.06 K/uL  Auto Basophil # : 0.05 K/uL  Auto Neutrophil % : 72.3 %  Auto Lymphocyte % : 17.8 %  Auto Monocyte % : 7.1 %  Auto Eosinophil % : 0.3 %  Auto Basophil % : 0.3 %    08-02    141  |  102  |  60<H>  ----------------------------<  145<H>  4.5   |  28  |  1.23    Ca    8.8      02 Aug 2022 04:07  Phos  3.3     08-02  Mg     1.7     08-02    TPro  5.6<L>  /  Alb  2.9<L>  /  TBili  0.2  /  DBili  x   /  AST  53<H>  /  ALT  14  /  AlkPhos  63  08-02    PT/INR - ( 02 Aug 2022 04:07 )   PT: 11.4 sec;   INR: 0.98 ratio         PTT - ( 02 Aug 2022 04:07 )  PTT:92.3 sec    Rad:    Assessment/Plan   LANIE BERTRAND  MRN-[id number]    Patient is a 82y old  Female who presents with a chief complaint of abnormal imaging (02 Aug 2022 02:39)      Review of System    comfortable  family at bedside    Current Meds  MEDICATIONS  (STANDING):  aspirin enteric coated 81 milliGRAM(s) Oral daily  atorvastatin 80 milliGRAM(s) Oral at bedtime  carvedilol 6.25 milliGRAM(s) Oral every 12 hours  dexAMETHasone     Tablet 2 milliGRAM(s) Oral every 12 hours  diVALproex  milliGRAM(s) Oral every 8 hours  gabapentin 200 milliGRAM(s) Oral at bedtime  heparin  Infusion 1050 Unit(s)/Hr (8.5 mL/Hr) IV Continuous <Continuous>  insulin glargine Injectable (LANTUS) 15 Unit(s) SubCutaneous at bedtime  insulin lispro (ADMELOG) corrective regimen sliding scale   SubCutaneous Before meals and at bedtime  insulin lispro Injectable (ADMELOG) 5 Unit(s) SubCutaneous before lunch  insulin lispro Injectable (ADMELOG) 7 Unit(s) SubCutaneous before breakfast  insulin lispro Injectable (ADMELOG) 7 Unit(s) SubCutaneous before dinner  losartan 100 milliGRAM(s) Oral daily  magnesium sulfate  IVPB 2 Gram(s) IV Intermittent every 2 hours  pantoprazole    Tablet 40 milliGRAM(s) Oral before breakfast  polyethylene glycol 3350 17 Gram(s) Oral two times a day  senna 2 Tablet(s) Oral at bedtime    MEDICATIONS  (PRN):  bisacodyl 5 milliGRAM(s) Oral daily PRN Constipation  melatonin 5 milliGRAM(s) Oral at bedtime PRN Sleep    Vital Signs Last 24 Hrs  T(C): 36.8 (02 Aug 2022 03:00), Max: 36.9 (01 Aug 2022 19:50)  T(F): 98.2 (02 Aug 2022 03:00), Max: 98.4 (01 Aug 2022 19:50)  HR: 72 (02 Aug 2022 06:00) (62 - 95)  BP: 102/54 (02 Aug 2022 06:00) (102/54 - 177/77)  BP(mean): 74 (02 Aug 2022 06:00) (74 - 123)  RR: 24 (02 Aug 2022 06:00) (18 - 27)  SpO2: 100% (02 Aug 2022 06:00) (95% - 100%)    Parameters below as of 02 Aug 2022 06:00  Patient On (Oxygen Delivery Method): nasal cannula  O2 Flow (L/min): 2        PHYSICAL EXAM:     NAD    Lab  CBC Full  -  ( 02 Aug 2022 04:07 )  WBC Count : 17.37 K/uL  RBC Count : 4.34 M/uL  Hemoglobin : 13.4 g/dL  Hematocrit : 40.5 %  Platelet Count - Automated : 303 K/uL  Mean Cell Volume : 93.3 fl  Mean Cell Hemoglobin : 30.9 pg  Mean Cell Hemoglobin Concentration : 33.1 gm/dL  Auto Neutrophil # : 12.56 K/uL  Auto Lymphocyte # : 3.09 K/uL  Auto Monocyte # : 1.23 K/uL  Auto Eosinophil # : 0.06 K/uL  Auto Basophil # : 0.05 K/uL  Auto Neutrophil % : 72.3 %  Auto Lymphocyte % : 17.8 %  Auto Monocyte % : 7.1 %  Auto Eosinophil % : 0.3 %  Auto Basophil % : 0.3 %    08-02    141  |  102  |  60<H>  ----------------------------<  145<H>  4.5   |  28  |  1.23    Ca    8.8      02 Aug 2022 04:07  Phos  3.3     08-02  Mg     1.7     08-02    TPro  5.6<L>  /  Alb  2.9<L>  /  TBili  0.2  /  DBili  x   /  AST  53<H>  /  ALT  14  /  AlkPhos  63  08-02    PT/INR - ( 02 Aug 2022 04:07 )   PT: 11.4 sec;   INR: 0.98 ratio         PTT - ( 02 Aug 2022 04:07 )  PTT:92.3 sec    Rad:    Assessment/Plan

## 2022-08-02 NOTE — PROGRESS NOTE ADULT - SUBJECTIVE AND OBJECTIVE BOX
DATE OF SERVICE: 08-02-22 @ 13:32    Patient is a 82y old  Female who presents with a chief complaint of abnormal imaging (02 Aug 2022 12:14)      SUBJECTIVE / OVERNIGHT EVENTS:  events noted.  seen in  ccu, spoke with son    MEDICATIONS  (STANDING):  aspirin enteric coated 81 milliGRAM(s) Oral daily  atorvastatin 80 milliGRAM(s) Oral at bedtime  dexAMETHasone     Tablet 2 milliGRAM(s) Oral every 12 hours  diVALproex  milliGRAM(s) Oral every 8 hours  gabapentin 200 milliGRAM(s) Oral at bedtime  heparin  Infusion 1050 Unit(s)/Hr (8.5 mL/Hr) IV Continuous <Continuous>  insulin glargine Injectable (LANTUS) 15 Unit(s) SubCutaneous at bedtime  insulin lispro (ADMELOG) corrective regimen sliding scale   SubCutaneous Before meals and at bedtime  insulin lispro Injectable (ADMELOG) 5 Unit(s) SubCutaneous before lunch  insulin lispro Injectable (ADMELOG) 7 Unit(s) SubCutaneous before dinner  insulin lispro Injectable (ADMELOG) 7 Unit(s) SubCutaneous before breakfast  losartan 100 milliGRAM(s) Oral daily  metoprolol tartrate 12.5 milliGRAM(s) Oral two times a day  pantoprazole    Tablet 40 milliGRAM(s) Oral before breakfast  polyethylene glycol 3350 17 Gram(s) Oral two times a day  senna 2 Tablet(s) Oral at bedtime    MEDICATIONS  (PRN):  bisacodyl 5 milliGRAM(s) Oral daily PRN Constipation  melatonin 5 milliGRAM(s) Oral at bedtime PRN Sleep      Vital Signs Last 24 Hrs  T(C): 36.8 (02 Aug 2022 08:00), Max: 36.9 (01 Aug 2022 19:50)  T(F): 98.2 (02 Aug 2022 08:00), Max: 98.4 (01 Aug 2022 19:50)  HR: 65 (02 Aug 2022 13:00) (59 - 95)  BP: 102/55 (02 Aug 2022 13:00) (77/48 - 177/77)  BP(mean): 77 (02 Aug 2022 13:00) (58 - 123)  RR: 16 (02 Aug 2022 13:00) (16 - 27)  SpO2: 93% (02 Aug 2022 13:00) (93% - 100%)    Parameters below as of 02 Aug 2022 11:00  Patient On (Oxygen Delivery Method): room air      CAPILLARY BLOOD GLUCOSE      POCT Blood Glucose.: 130 mg/dL (02 Aug 2022 13:20)  POCT Blood Glucose.: 152 mg/dL (02 Aug 2022 11:17)  POCT Blood Glucose.: 156 mg/dL (02 Aug 2022 07:57)  POCT Blood Glucose.: 187 mg/dL (01 Aug 2022 22:35)  POCT Blood Glucose.: 112 mg/dL (01 Aug 2022 17:34)    I&O's Summary    01 Aug 2022 07:01  -  02 Aug 2022 07:00  --------------------------------------------------------  IN: 310 mL / OUT: 0 mL / NET: 310 mL    02 Aug 2022 07:01  -  02 Aug 2022 13:32  --------------------------------------------------------  IN: 317 mL / OUT: 0 mL / NET: 317 mL        PHYSICAL EXAM:  GENERAL: NAD, well-developed  HEAD:  Atraumatic, Normocephalic  EYES: EOMI, PERRLA, conjunctiva and sclera clear  NECK: Supple, No JVD  CHEST/LUNG: Clear to auscultation bilaterally; No wheeze  HEART: Regular rate and rhythm; No murmurs, rubs, or gallops  ABDOMEN: Soft, Nontender, Nondistended; Bowel sounds present  EXTREMITIES:  2+ Peripheral Pulses, No clubbing, cyanosis, or edema  PSYCH: AAOx3  NEUROLOGY: non-focal  SKIN: No rashes or lesions    LABS:                        13.3   16.24 )-----------( 267      ( 02 Aug 2022 11:09 )             40.7     08-02    141  |  102  |  60<H>  ----------------------------<  145<H>  4.5   |  28  |  1.23    Ca    8.8      02 Aug 2022 04:07  Phos  3.3     08-02  Mg     1.7     08-02    TPro  5.6<L>  /  Alb  2.9<L>  /  TBili  0.2  /  DBili  x   /  AST  53<H>  /  ALT  14  /  AlkPhos  63  08-02    PT/INR - ( 02 Aug 2022 04:07 )   PT: 11.4 sec;   INR: 0.98 ratio         PTT - ( 02 Aug 2022 11:09 )  PTT:58.6 sec  CARDIAC MARKERS ( 02 Aug 2022 11:09 )  x     / x     / 470 U/L / x     / 72.7 ng/mL  CARDIAC MARKERS ( 02 Aug 2022 04:07 )  x     / x     / 386 U/L / x     / 57.1 ng/mL  CARDIAC MARKERS ( 01 Aug 2022 20:29 )  x     / x     / 171 U/L / x     / 19.7 ng/mL  CARDIAC MARKERS ( 01 Aug 2022 15:26 )  x     / x     / 78 U/L / x     / 2.0 ng/mL          RADIOLOGY & ADDITIONAL TESTS:    Imaging Personally Reviewed:    Consultant(s) Notes Reviewed:      Care Discussed with Consultants/Other Providers:

## 2022-08-02 NOTE — PROGRESS NOTE ADULT - SUBJECTIVE AND OBJECTIVE BOX
Patient seen and examined at bedside.    --Anticoagulation--  aspirin enteric coated 81 milliGRAM(s) Oral daily  heparin  Infusion 1050 Unit(s)/Hr IV Continuous <Continuous>    T(C): 36.9 (08-01-22 @ 19:50), Max: 36.9 (08-01-22 @ 19:50)  HR: 75 (08-02-22 @ 02:00) (58 - 95)  BP: 139/64 (08-02-22 @ 02:00) (104/60 - 177/77)  RR: 21 (08-02-22 @ 02:00) (18 - 27)  SpO2: 100% (08-02-22 @ 01:00) (95% - 100%)  Wt(kg): --    Exam: OX3, EOMI, mild R facial, [place] 5/5

## 2022-08-02 NOTE — PROGRESS NOTE ADULT - SUBJECTIVE AND OBJECTIVE BOX
Betty Reed , PGY-2    PATIENT: LANIE BERTRAND, MRN: [id number]    CHIEF COMPLAINT: Patient is a 82y old  Female who presents with a chief complaint of abnormal imaging (02 Aug 2022 07:05)      INTERVAL HISTORY/OVERNIGHT EVENTS: No overnight events. At bedside, patient has been sleeping and eating well. Denies N/V/D/C. Last BM x1 regular yesterday. Denies abdominal pain. Denies chest pain or SOB, cough. Has been ambulating with assistance. Oriented to person, place, and time. Breathing comfortably on room air.      MEDICATIONS:  MEDICATIONS  (STANDING):  aspirin enteric coated 81 milliGRAM(s) Oral daily  atorvastatin 80 milliGRAM(s) Oral at bedtime  carvedilol 6.25 milliGRAM(s) Oral every 12 hours  dexAMETHasone     Tablet 2 milliGRAM(s) Oral every 12 hours  diVALproex  milliGRAM(s) Oral every 8 hours  gabapentin 200 milliGRAM(s) Oral at bedtime  heparin  Infusion 1050 Unit(s)/Hr (8.5 mL/Hr) IV Continuous <Continuous>  insulin glargine Injectable (LANTUS) 15 Unit(s) SubCutaneous at bedtime  insulin lispro (ADMELOG) corrective regimen sliding scale   SubCutaneous Before meals and at bedtime  insulin lispro Injectable (ADMELOG) 5 Unit(s) SubCutaneous before lunch  insulin lispro Injectable (ADMELOG) 7 Unit(s) SubCutaneous before dinner  insulin lispro Injectable (ADMELOG) 7 Unit(s) SubCutaneous before breakfast  losartan 100 milliGRAM(s) Oral daily  magnesium sulfate  IVPB 2 Gram(s) IV Intermittent every 2 hours  pantoprazole    Tablet 40 milliGRAM(s) Oral before breakfast  polyethylene glycol 3350 17 Gram(s) Oral two times a day  senna 2 Tablet(s) Oral at bedtime    MEDICATIONS  (PRN):  bisacodyl 5 milliGRAM(s) Oral daily PRN Constipation  melatonin 5 milliGRAM(s) Oral at bedtime PRN Sleep      ALLERGIES: Allergies    adhesives (Pruritus)  No Known Drug Allergies    Intolerances        OBJECTIVE:  ICU Vital Signs Last 24 Hrs  T(C): 36.8 (02 Aug 2022 03:00), Max: 36.9 (01 Aug 2022 19:50)  T(F): 98.2 (02 Aug 2022 03:00), Max: 98.4 (01 Aug 2022 19:50)  HR: 70 (02 Aug 2022 07:00) (62 - 95)  BP: 96/51 (02 Aug 2022 07:00) (96/51 - 177/77)  BP(mean): 69 (02 Aug 2022 07:00) (69 - 123)  ABP: --  ABP(mean): --  RR: 21 (02 Aug 2022 07:00) (18 - 27)  SpO2: 98% (02 Aug 2022 07:00) (95% - 100%)    O2 Parameters below as of 02 Aug 2022 07:00  Patient On (Oxygen Delivery Method): nasal cannula  O2 Flow (L/min): 2          Adult Advanced Hemodynamics Last 24 Hrs  CVP(mm Hg): --  CVP(cm H2O): --  CO: --  CI: --  PA: --  PA(mean): --  PCWP: --  SVR: --  SVRI: --  PVR: --  PVRI: --  CAPILLARY BLOOD GLUCOSE      POCT Blood Glucose.: 156 mg/dL (02 Aug 2022 07:57)  POCT Blood Glucose.: 187 mg/dL (01 Aug 2022 22:35)  POCT Blood Glucose.: 112 mg/dL (01 Aug 2022 17:34)  POCT Blood Glucose.: 218 mg/dL (01 Aug 2022 11:30)    CAPILLARY BLOOD GLUCOSE      POCT Blood Glucose.: 156 mg/dL (02 Aug 2022 07:57)    I&O's Summary    01 Aug 2022 07:01  -  02 Aug 2022 07:00  --------------------------------------------------------  IN: 310 mL / OUT: 0 mL / NET: 310 mL      Daily     Daily     PHYSICAL EXAMINATION:  General: Comfortable, no acute distress, cooperative with exam.  HEENT: PERRLA, EOMI, moist mucous membranes.  Respiratory: CTAB, normal respiratory effort, no coughing, wheezes, crackles, or rales.  CV: RRR, S1/S2, no murmurs, rubs or gallops. No JVD. Distal pulses intact.  Abdominal: Soft, nontender, nondistended, no rebound or guarding, normal bowel sounds.  Neurology: AOx3, no focal neuro defects, DE LA GARZA x 4.  Extremities: No pitting edema, + Peripheral pulses.  Incisions:   Tubes:    LABS:                          13.4   17.37 )-----------( 303      ( 02 Aug 2022 04:07 )             40.5     08-02    141  |  102  |  60<H>  ----------------------------<  145<H>  4.5   |  28  |  1.23    Ca    8.8      02 Aug 2022 04:07  Phos  3.3     08-02  Mg     1.7     08-02    TPro  5.6<L>  /  Alb  2.9<L>  /  TBili  0.2  /  DBili  x   /  AST  53<H>  /  ALT  14  /  AlkPhos  63  08-02    LIVER FUNCTIONS - ( 02 Aug 2022 04:07 )  Alb: 2.9 g/dL / Pro: 5.6 g/dL / ALK PHOS: 63 U/L / ALT: 14 U/L / AST: 53 U/L / GGT: x           PT/INR - ( 02 Aug 2022 04:07 )   PT: 11.4 sec;   INR: 0.98 ratio         PTT - ( 02 Aug 2022 04:07 )  PTT:92.3 sec  CKMB Units: 57.1 ng/mL (08-02 @ 04:07)  Creatine Kinase, Serum: 386 U/L (08-02 @ 04:07)  CKMB Units: 19.7 ng/mL (08-01 @ 20:29)  Creatine Kinase, Serum: 171 U/L (08-01 @ 20:29)  CKMB Units: 2.0 ng/mL (08-01 @ 15:26)  Creatine Kinase, Serum: 78 U/L (08-01 @ 15:26)    CARDIAC MARKERS ( 02 Aug 2022 04:07 )  x     / x     / 386 U/L / x     / 57.1 ng/mL  CARDIAC MARKERS ( 01 Aug 2022 20:29 )  x     / x     / 171 U/L / x     / 19.7 ng/mL  CARDIAC MARKERS ( 01 Aug 2022 15:26 )  x     / x     / 78 U/L / x     / 2.0 ng/mL          TELEMETRY:     EKG:     IMAGING:  Betty Reed , PGY-2    PATIENT: LANIE BERTRAND, MRN: [id number]    CHIEF COMPLAINT: Patient is a 82y old  Female who presents with a chief complaint of abnormal imaging (02 Aug 2022 07:05)      INTERVAL HISTORY/OVERNIGHT EVENTS: Overnight CTH unchanged. This AM, follows is AAOx3 and follows commands, however somnolent. Denies N/V/D/C. Denies abdominal pain. Denies chest pain or SOB, cough. Oriented to person, place, and time. Breathing comfortably on 2L NC.      MEDICATIONS:  MEDICATIONS  (STANDING):  aspirin enteric coated 81 milliGRAM(s) Oral daily  atorvastatin 80 milliGRAM(s) Oral at bedtime  carvedilol 6.25 milliGRAM(s) Oral every 12 hours  dexAMETHasone     Tablet 2 milliGRAM(s) Oral every 12 hours  diVALproex  milliGRAM(s) Oral every 8 hours  gabapentin 200 milliGRAM(s) Oral at bedtime  heparin  Infusion 1050 Unit(s)/Hr (8.5 mL/Hr) IV Continuous <Continuous>  insulin glargine Injectable (LANTUS) 15 Unit(s) SubCutaneous at bedtime  insulin lispro (ADMELOG) corrective regimen sliding scale   SubCutaneous Before meals and at bedtime  insulin lispro Injectable (ADMELOG) 5 Unit(s) SubCutaneous before lunch  insulin lispro Injectable (ADMELOG) 7 Unit(s) SubCutaneous before dinner  insulin lispro Injectable (ADMELOG) 7 Unit(s) SubCutaneous before breakfast  losartan 100 milliGRAM(s) Oral daily  magnesium sulfate  IVPB 2 Gram(s) IV Intermittent every 2 hours  pantoprazole    Tablet 40 milliGRAM(s) Oral before breakfast  polyethylene glycol 3350 17 Gram(s) Oral two times a day  senna 2 Tablet(s) Oral at bedtime    MEDICATIONS  (PRN):  bisacodyl 5 milliGRAM(s) Oral daily PRN Constipation  melatonin 5 milliGRAM(s) Oral at bedtime PRN Sleep      ALLERGIES: Allergies    adhesives (Pruritus)  No Known Drug Allergies    Intolerances        OBJECTIVE:  ICU Vital Signs Last 24 Hrs  T(C): 36.8 (02 Aug 2022 03:00), Max: 36.9 (01 Aug 2022 19:50)  T(F): 98.2 (02 Aug 2022 03:00), Max: 98.4 (01 Aug 2022 19:50)  HR: 70 (02 Aug 2022 07:00) (62 - 95)  BP: 96/51 (02 Aug 2022 07:00) (96/51 - 177/77)  BP(mean): 69 (02 Aug 2022 07:00) (69 - 123)  ABP: --  ABP(mean): --  RR: 21 (02 Aug 2022 07:00) (18 - 27)  SpO2: 98% (02 Aug 2022 07:00) (95% - 100%)    O2 Parameters below as of 02 Aug 2022 07:00  Patient On (Oxygen Delivery Method): nasal cannula  O2 Flow (L/min): 2          Adult Advanced Hemodynamics Last 24 Hrs  CVP(mm Hg): --  CVP(cm H2O): --  CO: --  CI: --  PA: --  PA(mean): --  PCWP: --  SVR: --  SVRI: --  PVR: --  PVRI: --  CAPILLARY BLOOD GLUCOSE      POCT Blood Glucose.: 156 mg/dL (02 Aug 2022 07:57)  POCT Blood Glucose.: 187 mg/dL (01 Aug 2022 22:35)  POCT Blood Glucose.: 112 mg/dL (01 Aug 2022 17:34)  POCT Blood Glucose.: 218 mg/dL (01 Aug 2022 11:30)    CAPILLARY BLOOD GLUCOSE      POCT Blood Glucose.: 156 mg/dL (02 Aug 2022 07:57)    I&O's Summary    01 Aug 2022 07:01  -  02 Aug 2022 07:00  --------------------------------------------------------  IN: 310 mL / OUT: 0 mL / NET: 310 mL      Daily     Daily     PHYSICAL EXAMINATION:  General: Comfortable, no acute distress, cooperative with exam.  Respiratory: CTAB, normal respiratory effort, no coughing, wheezes, crackles, or rales.  CV: RRR, S1/S2, no murmurs, rubs or gallops. No JVD. Distal pulses intact.  Abdominal: Soft, nontender, nondistended, no rebound or guarding, normal bowel sounds.  Neurology: AOx3, no focal neuro defects, DE LA GARZA x 4.  Extremities: No pitting edema, + Peripheral pulses.  Incisions:   Tubes:    LABS:                          13.4   17.37 )-----------( 303      ( 02 Aug 2022 04:07 )             40.5         141  |  102  |  60<H>  ----------------------------<  145<H>  4.5   |  28  |  1.23    Ca    8.8      02 Aug 2022 04:07  Phos  3.3       Mg     1.7         TPro  5.6<L>  /  Alb  2.9<L>  /  TBili  0.2  /  DBili  x   /  AST  53<H>  /  ALT  14  /  AlkPhos  63      LIVER FUNCTIONS - ( 02 Aug 2022 04:07 )  Alb: 2.9 g/dL / Pro: 5.6 g/dL / ALK PHOS: 63 U/L / ALT: 14 U/L / AST: 53 U/L / GGT: x           PT/INR - ( 02 Aug 2022 04:07 )   PT: 11.4 sec;   INR: 0.98 ratio         PTT - ( 02 Aug 2022 04:07 )  PTT:92.3 sec  CKMB Units: 57.1 ng/mL ( @ 04:07)  Creatine Kinase, Serum: 386 U/L ( @ 04:07)  CKMB Units: 19.7 ng/mL ( @ 20:29)  Creatine Kinase, Serum: 171 U/L ( @ 20:29)  CKMB Units: 2.0 ng/mL ( @ 15:26)  Creatine Kinase, Serum: 78 U/L ( @ 15:26)    CARDIAC MARKERS ( 02 Aug 2022 04:07 )  x     / x     / 386 U/L / x     / 57.1 ng/mL  CARDIAC MARKERS ( 01 Aug 2022 20:29 )  x     / x     / 171 U/L / x     / 19.7 ng/mL  CARDIAC MARKERS ( 01 Aug 2022 15:26 )  x     / x     / 78 U/L / x     / 2.0 ng/mL          TELEMETRY: 3 beats NSVT at 5:30am    EKG: QTc 506    IMAGIN:15am CTH reviewed, unchanged from prior  10:55am CTH reviewed, unchanged from prior

## 2022-08-02 NOTE — PROGRESS NOTE ADULT - ASSESSMENT
Assessment  DMT2: 82y Female with DM T2 with hyperglycemia, A1C 8.4%, was on oral meds and insulin at home, now on basal bolus insulin, decreased dose yesterday, blood sugars are trending within acceptable range, no hypoglycemias, steroid taper in progress, transferred to ccu s/p chest pain/STEMI.  Brain Mass: postop, on meds, monitored, FU Neurosurgery.  Lung Mass: s/p bronchoscopy, CA workup in progress, monitored.  Thyroid Nodules: seen on imaging, B/L subcentimeter thyroid nodules, she denies neck mass/pain/dysphagia, euthyroid, reports known history and is being followed by Dr. Richardson.  Adrenal Nodule: seen on imaging, 1.2 cm left thyroid nodule, unknown history. AM cortisol suppressed 2/2 current steroid use, aldosterone and metanephrine labs in normal range.        Troy Morelia ESPINOZA  [phone]

## 2022-08-02 NOTE — PROGRESS NOTE ADULT - ASSESSMENT
ASSESSMENT:    82 year old gentlewoman, former smoker, without history of intrinsic lung disease. She has a history of HTN, HLD, DM, CAD s/p PCI (1990s and 2021) and PAD s/p RLE stenting. The patient has been having difficulty ambulating over the last several months due to a sense of imbalance with impaired gait. Her family has noted a change in her personality with pressured speech and insomnia. Outpatient cervical MRI incidentally note a CNS lesion. Lumbar spine MRI performed due to chronic back pain revealed multilevel foraminal stenosis/disc bulges. 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 The patient has no shortness of breath or hypoxemia on room air. She has no cough, sputum production, chest congestion or wheeze. No fevers, chills or sweats. No chest pain/pressure or palpitations.     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 presenting with several months of ataxia - frontal lobe lesions have lead to decreased inhibition as well as to pressured and loquacious speech    8/2 - chest pain last night -> EKG c/w STEMI - cardiac enzymes are elevated -> catheterization not performed due to recent neurosurgery -> started on ASA and heparin gtt -> now quite lethargic although awakes to state her name and the name of her children;       PLAN/RECOMMENDATIONS:    stable oxygenation on room air  spirometry    FEV1 - 1.81 liters - 88% predicted    FVC - 2.48 liters - 90% predicted    FEV1% - 73       c/w normal spiromatery  no indication for pulmonary medications or antibiotics  s/p bronchoscopy -> 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 - foundation studies have been ordered  cardiology evaluation noted     STEMI - not a candidate for percutaneous coronary intervention given recent craniotomy limiting anti-platelet agents    medical management including ASA/heparin gtt/lipitor/losartan/metoprolol  neurosurgery evaluation noted    s/p right craniotomy for tumor resection    repeat CT scan 8/2  -> right temporal craniotomy - minimally decreased right frontal pneumocephalus - similar subdural hemorrhage along the right lateral convexity measuring up to 7 to 8 mm in greatest depth anteriorly - extensive edema surrounding the right temporal surgical bed - leftward midline shift of 6 to 7 mm - partial effacement of the suprasellar and right perimesencephalic cisterns - mild asymmetric dilatation of the left lateral ventricle - low density lesion with mild surrounding edema in the left parasagittal frontal lobe.    prophylactic anticonvulsants -> depakote    decadron 2mg q12h po for cerebral edema -> taper continues (?)    incentive spirometry as able    NPO until mental status improves    GI/DVT prophylaxis - protonix/heparin gtt  endocrinology evaluation noted    insulin regimen being adjusted for steroid induced hyperglycemia  radiation oncology, neurooncology, medical oncology evaluations   bowel regimen    Will follow with you. Plan of care discussed with the patient's daughter-in-law by phone and with the CCU team.    Satish Hopkins MD, [facility]  [phone]  Pulmonary Medicine

## 2022-08-02 NOTE — ED PROVIDER NOTE - MDM ORDERS SUBMITTED SELECTION
“Patient's name, , procedure and correct site were confirmed during the [place] Timeout.” Labs/EKG/Imaging Studies/Medications

## 2022-08-02 NOTE — PROGRESS NOTE ADULT - SUBJECTIVE AND OBJECTIVE BOX
Chief complaint  Patient is a 82y old  Female who presents with a chief complaint of abnormal imaging (02 Aug 2022 13:32)   Review of systems  Events noted, transferred to cicu s/p STEMI, no hypoglycemic episodes.    Labs and Fingersticks  CAPILLARY BLOOD GLUCOSE      POCT Blood Glucose.: 130 mg/dL (02 Aug 2022 13:20)  POCT Blood Glucose.: 152 mg/dL (02 Aug 2022 11:17)  POCT Blood Glucose.: 156 mg/dL (02 Aug 2022 07:57)  POCT Blood Glucose.: 187 mg/dL (01 Aug 2022 22:35)  POCT Blood Glucose.: 112 mg/dL (01 Aug 2022 17:34)      Anion Gap, Serum: 11 (08-02 @ 04:07)  Anion Gap, Serum: 17 (08-01 @ 20:29)      Calcium, Total Serum: 8.8 (08-02 @ 04:07)  Calcium, Total Serum: 9.0 (08-01 @ 20:29)  Albumin, Serum: 2.9 *L* (08-02 @ 04:07)  Albumin, Serum: 3.4 (08-01 @ 20:29)    Alanine Aminotransferase (ALT/SGPT): 14 (08-02 @ 04:07)  Alanine Aminotransferase (ALT/SGPT): 16 (08-01 @ 20:29)  Alkaline Phosphatase, Serum: 63 (08-02 @ 04:07)  Alkaline Phosphatase, Serum: 75 (08-01 @ 20:29)  Aspartate Aminotransferase (AST/SGOT): 53 *H* (08-02 @ 04:07)  Aspartate Aminotransferase (AST/SGOT): 25 (08-01 @ 20:29)        08-02    141  |  102  |  60<H>  ----------------------------<  145<H>  4.5   |  28  |  1.23    Ca    8.8      02 Aug 2022 04:07  Phos  3.3     08-02  Mg     1.7     08-02    TPro  5.6<L>  /  Alb  2.9<L>  /  TBili  0.2  /  DBili  x   /  AST  53<H>  /  ALT  14  /  AlkPhos  63  08-02                        13.3   16.24 )-----------( 267      ( 02 Aug 2022 11:09 )             40.7     Medications  MEDICATIONS  (STANDING):  aspirin enteric coated 81 milliGRAM(s) Oral daily  atorvastatin 80 milliGRAM(s) Oral at bedtime  dexAMETHasone     Tablet 2 milliGRAM(s) Oral every 12 hours  diVALproex  milliGRAM(s) Oral every 8 hours  gabapentin 200 milliGRAM(s) Oral at bedtime  heparin  Infusion 1050 Unit(s)/Hr (8.5 mL/Hr) IV Continuous <Continuous>  insulin glargine Injectable (LANTUS) 15 Unit(s) SubCutaneous at bedtime  insulin lispro (ADMELOG) corrective regimen sliding scale   SubCutaneous Before meals and at bedtime  insulin lispro Injectable (ADMELOG) 5 Unit(s) SubCutaneous before lunch  insulin lispro Injectable (ADMELOG) 7 Unit(s) SubCutaneous before dinner  insulin lispro Injectable (ADMELOG) 7 Unit(s) SubCutaneous before breakfast  losartan 100 milliGRAM(s) Oral daily  metoprolol tartrate 12.5 milliGRAM(s) Oral two times a day  pantoprazole    Tablet 40 milliGRAM(s) Oral before breakfast  polyethylene glycol 3350 17 Gram(s) Oral two times a day  senna 2 Tablet(s) Oral at bedtime      Physical Exam  General: Patient comfortable in bed  Vital Signs Last 12 Hrs  T(F): 98.2 (08-02-22 @ 08:00), Max: 98.2 (08-02-22 @ 03:00)  HR: 65 (08-02-22 @ 13:00) (59 - 75)  BP: 102/55 (08-02-22 @ 13:00) (77/48 - 152/69)  BP(mean): 77 (08-02-22 @ 13:00) (58 - 107)  RR: 16 (08-02-22 @ 13:00) (16 - 24)  SpO2: 93% (08-02-22 @ 13:00) (93% - 100%)    Diagnostics    Metanephrine, Plasma: AM Sched. Collection: 21-Jul-2022 06:00 (07-20 @ 10:54)  Aldosterone, Serum: AM Sched. Collection: 21-Jul-2022 06:00 (07-20 @ 10:54)  Cortisol AM, Serum: AM Sched. Collection: 21-Jul-2022 06:00 (07-20 @ 10:54)

## 2022-08-02 NOTE — PROGRESS NOTE ADULT - ASSESSMENT
1) Lung and brain masses likely a metastatic process.  Lung bx c/w metastatic adenocarcinoma  - pt s/p nsgy, resection of brain met  patient has also been seen by rad/Onc. Getting steroids and keppra.   Further med onc mgmt as outpt  pathology being sent for foundation testing.     2)Leukocytosis- likely reactive/steroid related 1) Lung and brain masses likely a metastatic process.  Lung bx c/w metastatic adenocarcinoma  - pt s/p nsgy, resection of brain met  patient has also been seen by rad/Onc. Getting steroids and keppra.   Further med onc mgmt as outpt  pathology being sent for foundation testing.     2)Leukocytosis- likely reactive/steroid related    30 CAD- s/p MI. mgmt as per mrd, cv

## 2022-08-02 NOTE — PROGRESS NOTE ADULT - SUBJECTIVE AND OBJECTIVE BOX
CARDIOLOGY FOLLOW UP - Dr. Alcaraz  DATE OF SERVICE: 8/2/22     CC transferred to CCU   fam at bedside   unable to assess ROS given mental status       REVIEW OF SYSTEMS:  unable to assess given mental status     PHYSICAL EXAM:  T(C): 36.8 (08-02-22 @ 08:00), Max: 36.9 (08-01-22 @ 19:50)  HR: 59 (08-02-22 @ 11:00) (59 - 95)  BP: 126/60 (08-02-22 @ 11:00) (77/49 - 177/77)  RR: 16 (08-02-22 @ 11:00) (16 - 27)  SpO2: 95% (08-02-22 @ 11:00) (94% - 100%)  Wt(kg): --  I&O's Summary    01 Aug 2022 07:01  -  02 Aug 2022 07:00  --------------------------------------------------------  IN: 310 mL / OUT: 0 mL / NET: 310 mL    02 Aug 2022 07:01  -  02 Aug 2022 12:14  --------------------------------------------------------  IN: 317 mL / OUT: 0 mL / NET: 317 mL        Appearance: Normal	  Cardiovascular: Normal S1 S2,RRR, No JVD, No murmurs  Respiratory: Lungs clear to auscultation	  Gastrointestinal:  Soft, Non-tender, + BS	  Extremities: Normal range of motion, No clubbing, cyanosis or edema      Home Medications:  acetaminophen 325 mg oral tablet: 2 tab(s) orally every 6 hours, As needed, Temp greater or equal to 38C (100.4F), Mild Pain (1 - 3) (01 Aug 2022 13:52)  amLODIPine 5 mg oral tablet: 1 tab(s) orally once a day (01 Aug 2022 13:52)  atorvastatin 40 mg oral tablet: 1 tab(s) orally once a day (at bedtime) (01 Aug 2022 13:52)  bisacodyl 5 mg oral delayed release tablet: 1 tab(s) orally once a day, As needed, Constipation (01 Aug 2022 13:52)  dexamethasone 2 mg oral tablet: 1 tab po every 8 hours for 2 days, then 1 tab po q12 hours x2 days, then 1 tab po daily x3 days, then stop (01 Aug 2022 13:52)  divalproex sodium 500 mg oral delayed release tablet: 1 tab(s) orally every 8 hours (01 Aug 2022 13:52)  enoxaparin: 40 milligram(s) subcutaneous once a day (at bedtime) (01 Aug 2022 13:52)  gabapentin 100 mg oral capsule: 2 cap(s) orally once a day (at bedtime) (01 Aug 2022 13:52)  insulin glargine 100 units/mL subcutaneous solution: 15 unit(s) subcutaneous once a day (at bedtime) (01 Aug 2022 13:52)  insulin lispro 100 units/mL injectable solution: 5 unit(s) subcutaneous once a day before lunch (01 Aug 2022 13:52)  insulin lispro 100 units/mL injectable solution: 7 unit(s) subcutaneous once a day before breakfast (01 Aug 2022 13:52)  insulin lispro 100 units/mL injectable solution: 7 unit(s) subcutaneous once a day before dinner (01 Aug 2022 13:52)  insulin lispro 100 units/mL injectable solution: moderate dose sliding scale as per hospital/facility protocol, 3 times a day (before meals) (01 Aug 2022 13:52)  losartan 100 mg oral tablet: 1 tab(s) orally once a day (01 Aug 2022 13:52)  melatonin 5 mg oral tablet: 1 tab(s) orally once a day (at bedtime), As needed, Sleep (01 Aug 2022 13:52)  metoprolol tartrate 25 mg oral tablet: 1 tab(s) orally 2 times a day (01 Aug 2022 13:52)  oxyCODONE 10 mg oral tablet: 1 tab(s) orally every 4 hours, As needed, Severe Pain (7 - 10) (01 Aug 2022 13:52)  oxyCODONE 5 mg oral tablet: 1 tab(s) orally every 4 hours, As needed, Moderate Pain (4 - 6) (01 Aug 2022 13:52)  pantoprazole 40 mg oral delayed release tablet: 1 tab(s) orally once a day (before a meal) (01 Aug 2022 13:52)  polyethylene glycol 3350 oral powder for reconstitution: 17 gram(s) orally 2 times a day (01 Aug 2022 13:52)  senna leaf extract oral tablet: 2 tab(s) orally once a day (at bedtime) (01 Aug 2022 13:52)      MEDICATIONS  (STANDING):  aspirin enteric coated 81 milliGRAM(s) Oral daily  atorvastatin 80 milliGRAM(s) Oral at bedtime  dexAMETHasone     Tablet 2 milliGRAM(s) Oral every 12 hours  diVALproex  milliGRAM(s) Oral every 8 hours  gabapentin 200 milliGRAM(s) Oral at bedtime  heparin  Infusion 1050 Unit(s)/Hr (8.5 mL/Hr) IV Continuous <Continuous>  insulin glargine Injectable (LANTUS) 15 Unit(s) SubCutaneous at bedtime  insulin lispro (ADMELOG) corrective regimen sliding scale   SubCutaneous Before meals and at bedtime  insulin lispro Injectable (ADMELOG) 5 Unit(s) SubCutaneous before lunch  insulin lispro Injectable (ADMELOG) 7 Unit(s) SubCutaneous before dinner  insulin lispro Injectable (ADMELOG) 7 Unit(s) SubCutaneous before breakfast  losartan 100 milliGRAM(s) Oral daily  metoclopramide Injectable 10 milliGRAM(s) IV Push once  metoprolol tartrate 12.5 milliGRAM(s) Oral two times a day  pantoprazole    Tablet 40 milliGRAM(s) Oral before breakfast  polyethylene glycol 3350 17 Gram(s) Oral two times a day  senna 2 Tablet(s) Oral at bedtime      TELEMETRY: 	    ECG:  	  RADIOLOGY:   < from: CT Head No Cont (08.02.22 @ 10:55) >  FINDINGS:    Redemonstration of right temporal craniotomy.    Mildly decreased right frontal pneumocephalus.    Right lateral convexity mixed density subdural collection measures 8 mm   in greatest depth, previously measuring 7.    Leftward midline shift of 6 mm, previously 7 mm.    Similar partial effacement of the right aspect of the suprasellar and   perimesencephalic cisterns. Similar mild asymmetric dilatation of the   left lateral ventricle.    Similar minimal hemorrhage in the right temporal surgical bed. Similar   extensive surrounding vasogenic edema.    Similar 1.1 cm low-density lesion in the left parasagittal frontal lobe   with small surrounding edema.    IMPRESSION:    No significant interval change from 8/2/2022 study obtained at 4:51 AM    --- End of Report ---      < end of copied text >    DIAGNOSTIC TESTING:  [ ] Echocardiogram:  < from: Limited Transthoracic Echo (w/Cont) (08.02.22 @ 08:02) >    ------------------------------------------------------------------------  Conclusions:  1. Endocardial visualization enhanced with intravenous  injection of Ultrasonic Enhancing Agent (Lumason). Left  ventricle not well visualized despite intravenous  ultrasonic enhancing agent; grossly, preserved left  ventricular systolic function with segmental wall motion  abnormalities. EF approximately 55%. The mid to distal  inferolateral and mid to distal inferior segments are  hypkinetic. The apex is akinetic. No left ventricular  thrombus seen.  *** Compared with echocardiogram of 7/22/2022, results are  similar on today's study. The mid to distal inferior and  the apex appear hypo/akinetic on today's study on images  with intravenous ultrasonic enhancing agent.  ------------------------------------------------------------------------  Confirmed on  8/2/2022 - 10:57:19 by Kelsie Becker M.D.  ------------------------------------------------------------------------    < end of copied text >    [ ]  Catheterization:  [ ] Stress Test:    OTHER: 	    LABS:	 	    Creatine Kinase, Serum: 470 U/L [25 - 170] (08-02 @ 11:09)  Troponin T, High Sensitivity Result: 961 ng/L [0 - 51] (08-02 @ 11:09)  CKMB Units: 57.1 ng/mL [0.0 - 3.8] (08-02 @ 04:07)  Creatine Kinase, Serum: 386 U/L [25 - 170] (08-02 @ 04:07)  Troponin T, High Sensitivity Result: 622 ng/L [0 - 51] (08-02 @ 04:07)  CKMB Units: 19.7 ng/mL [0.0 - 3.8] (08-01 @ 20:29)  Creatine Kinase, Serum: 171 U/L [25 - 170] (08-01 @ 20:29)  Troponin T, High Sensitivity Result: 285 ng/L [0 - 51] (08-01 @ 20:29)  Troponin T, High Sensitivity Result: 22 ng/L [0 - 51] (08-01 @ 15:26)  CKMB Units: 2.0 ng/mL [0.0 - 3.8] (08-01 @ 15:26)  Creatine Kinase, Serum: 78 U/L [25 - 170] (08-01 @ 15:26)                          13.3   16.24 )-----------( 267      ( 02 Aug 2022 11:09 )             40.7     08-02    141  |  102  |  60<H>  ----------------------------<  145<H>  4.5   |  28  |  1.23    Ca    8.8      02 Aug 2022 04:07  Phos  3.3     08-02  Mg     1.7     08-02    TPro  5.6<L>  /  Alb  2.9<L>  /  TBili  0.2  /  DBili  x   /  AST  53<H>  /  ALT  14  /  AlkPhos  63  08-02    PT/INR - ( 02 Aug 2022 04:07 )   PT: 11.4 sec;   INR: 0.98 ratio         PTT - ( 02 Aug 2022 11:09 )  PTT:58.6 sec

## 2022-08-02 NOTE — PROGRESS NOTE ADULT - SUBJECTIVE AND OBJECTIVE BOX
LANIE BERTRAND  MRN-[id number]  Patient is a 82y old  Female who presents with a chief complaint of abnormal imaging (02 Aug 2022 13:47)    HPI: 82f (former smoker) with PMHx of HTN, CAD, HLD, MI, peripheral neuropathy, DM and peripheral artery disease presented to the ED for imaging for her head due to abnormal spine MRI by her orthopedist (Dr. Castro) during workup for her spine for her chronic back problems. The orthopedist noted something in her brain. Otherwise pt has no symptoms, stated that since 2022 she has had gait/balance issues. Endorsed that if she sits for a while and gets up, she feels like she is "drunk" and feels off. Denied chest pain, SOB, fevers, chills, numbness/paresthesias, muscular weakness, dizziness, abdominal pain, headaches. (2022 10:06)    Hospital course: MR head significant for R temporal lobe lesion with edema, midline shift and uncal herniation concerning for metastatic disease. 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 s/p craniotomy for resection of brain mass . ASA/Plavix was held in preparation for craniotomy.  patient began to complain of 8/10 CP, EKG with STEMI in multiple leads. Patient ASA loaded and restarted on heparin gtt.    REVIEW OF SYSTEMS:  CONSTITUTIONAL: No weakness, fevers or chills  EYES/ENT: No visual changes;  No vertigo or throat pain   NECK: No pain or stiffness  RESPIRATORY: No cough, wheezing, hemoptysis; No shortness of breath  CARDIOVASCULAR: No chest pain or palpitations  GASTROINTESTINAL: No abdominal or epigastric pain. No nausea, vomiting, or hematemesis; No diarrhea or constipation. No melena or hematochezia.  GENITOURINARY: No dysuria, frequency or hematuria  NEUROLOGICAL: No numbness or weakness  SKIN: No itching, rashes    ICU Vital Signs Last 24 Hrs  T(C): 36.4 (02 Aug 2022 19:00), Max: 36.9 (01 Aug 2022 23:00)  T(F): 97.6 (02 Aug 2022 19:00), Max: 98.4 (01 Aug 2022 23:00)  HR: 83 (02 Aug 2022 19:00) (59 - 89)  BP: 121/63 (02 Aug 2022 19:00) (77/48 - 152/69)  BP(mean): 84 (02 Aug 2022 19:00) (58 - 107)  RR: 25 (02 Aug 2022 19:00) (16 - 26)  SpO2: 97% (02 Aug 2022 19:00) (93% - 100%)    O2 Parameters below as of 02 Aug 2022 19:00  Patient On (Oxygen Delivery Method): room air    I&O's Summary    01 Aug 2022 07:01  -  02 Aug 2022 07:00  --------------------------------------------------------  IN: 310 mL / OUT: 0 mL / NET: 310 mL    02 Aug 2022 07:01  -  02 Aug 2022 22:32  --------------------------------------------------------  IN: 659 mL / OUT: 1100 mL / NET: -441 mL      CAPILLARY BLOOD GLUCOSE  POCT Blood Glucose.: 318 mg/dL (02 Aug 2022 22:27)  ============================I/O===========================   I&O's Detail    01 Aug 2022 07:01  -  02 Aug 2022 07:00  --------------------------------------------------------  IN:    Heparin: 20 mL    Heparin: 80 mL    IV PiggyBack: 50 mL    Oral Fluid: 160 mL  Total IN: 310 mL    OUT:  Total OUT: 0 mL    Total NET: 310 mL      02 Aug 2022 07:01  -  02 Aug 2022 22:32  --------------------------------------------------------  IN:    Heparin: 119 mL    IV PiggyBack: 50 mL    Oral Fluid: 240 mL    Sodium Chloride 0.9% Bolus: 250 mL  Total IN: 659 mL    OUT:    Intermittent Catheterization - Urethral (mL): 1100 mL  Total OUT: 1100 mL    Total NET: -441 mL  ============================ LABS ========================                   13.3   16.24 )-----------( 267      ( 02 Aug 2022 11:09 )             40.7         141  |  102  |  60<H>  ----------------------------<  145<H>  4.5   |  28  |  1.23    Ca    8.8      02 Aug 2022 04:07  Phos  3.3       Mg     1.7         TPro  5.6<L>  /  Alb  2.9<L>  /  TBili  0.2  /  DBili  x   /  AST  53<H>  /  ALT  14  /  AlkPhos  63      Troponin T, High Sensitivity Result: 948 ng/L (22 @ 17:12)  Troponin T, High Sensitivity Result: 961 ng/L (22 @ 11:09)  Troponin T, High Sensitivity Result: 622 ng/L (22 @ 04:07)  Troponin T, High Sensitivity Result: 285 ng/L (22 @ 20:29)  Troponin T, High Sensitivity Result: 22 ng/L (22 @ 15:26)    CKMB Units: 63.2 ng/mL (22 @ 17:12)  CKMB Units: 72.7 ng/mL (22 @ 11:09)  CKMB Units: 57.1 ng/mL (22 @ 04:07)  CKMB Units: 19.7 ng/mL (22 @ 20:29)  CKMB Units: 2.0 ng/mL (22 @ 15:26)    Creatine Kinase, Serum: 466 U/L (22 @ 17:12)  Creatine Kinase, Serum: 470 U/L (22 @ 11:09)  Creatine Kinase, Serum: 386 U/L (22 @ 04:07)  Creatine Kinase, Serum: 171 U/L (22 @ 20:29)  Creatine Kinase, Serum: 78 U/L (22 @ 15:26)    CPK Mass Assay %: 13.6 % (22 @ 17:12)  CPK Mass Assay %: 15.5 % (22 @ 11:09)  CPK Mass Assay %: 14.8 % (22 @ 04:07)  CPK Mass Assay %: 11.5 % (22 @ 20:29)  CPK Mass Assay %: 2.6 % (22 @ 15:26)    LIVER FUNCTIONS - ( 02 Aug 2022 04:07 )  Alb: 2.9 g/dL / Pro: 5.6 g/dL / ALK PHOS: 63 U/L / ALT: 14 U/L / AST: 53 U/L / GGT: x           PT/INR - ( 02 Aug 2022 04:07 )   PT: 11.4 sec;   INR: 0.98 ratio    PTT - ( 02 Aug 2022 18:01 )  PTT:62.0 sec    Lactate, Blood: 1.4 mmol/L (22 @ 04:07)  Lactate, Blood: 2.7 mmol/L (22 @ 20:29)  ======================Micro/Rad/Cardio=================  Telemetry: Reviewed   EKG: Reviewed  CXR: Reviewed  Culture: Reviewed   Echo: Limited Transthoracic Echo (w/Cont):   Patient name: LANIE BERTRAND  YOB: 1940   Age: 82 (F)   MR#: [id number]  Study Date: 2022  [facility] Cesar Cobos RDCS  Study quality: Technically difficult  Referring Physician: Willard Loomis MD  Blood Pressure: 102/54 mmHg  Height: 165 cm  Weight: 73 kg  BSA: 1.8 m2  ------------------------------------------------------------------------  PROCEDURE: Limited transthoracic echocardiogram with 2-D.  M-Mode and spectral and color flow Doppler.  INDICATION: ST elevation (STEMI) myocardial infarction of  unspecified site (I21.3)  ------------------------------------------------------------------------  Observations:  Left Ventricle: Endocardial visualization enhanced with  intravenous injection of Ultrasonic Enhancing Agent  (Lumason). Left ventricle not well visualized despite  intravenous ultrasonic enhancing agent; grossly, preserved  left ventricular systolic function with segmental wall  motion abnormalities. EF approximately 55%. The mid to  distal inferolateral and mid to distal inferior segments  are hypkinetic. The apex is akinetic. No left ventricular  thrombus seen.  ------------------------------------------------------------------------  Conclusions:  1. Endocardial visualization enhanced with intravenous  injection of Ultrasonic Enhancing Agent (Lumason). Left  ventricle not well visualized despite intravenous  ultrasonic enhancing agent; grossly, preserved left  ventricular systolic function with segmental wall motion  abnormalities. EF approximately 55%. The mid to distal  inferolateral and mid to distal inferior segments are  hypkinetic. The apex is akinetic. No left ventricular  thrombus seen.    Cath: Cardiac Cath Lab - Adult:   [facility]  Department of Cardiology  [address] [place], [place] [address] [phone]  Cath Lab Report -- Comprehensive Report  Patient: LANIE BERTRAND  Study date: 2021  Account number: [id number]  MR number: [id number]  : 1940  Gender: Female  Race: W  Case Physician(s):  PAT Laboy M.D.  Fellow:  Igor Allison MD  Referring Physician:  Kimani Uribe M.D.  INDICATIONS: Patient with prior history of severe ISR of the RCA s/p POBA  who returns today for intracoronary brachytherapy. Unstable angina - CCS3.  HISTORY: The patient has a history of coronary artery disease. The patient  has hypertension and oral hypoglycemic-treated diabetes.  PROCEDURE:  --  Left coronary angiography.  --  Sonosite - Diagnostic.  --  Intra-Coronary Brachytherapy.  --  Intravascular Lithotripsy.  --  Intervention on mid RCA: brachytherapy.  TECHNIQUE: The risks and alternatives of the procedures and conscious  sedation were explained to the patient and informed consent was obtained.  Cardiac catheterization performed electively.  Local anesthetic given. Right femoral artery access. Left coronary artery  angiography. The vessel was injected utilizing a catheter. Sonosite -  Diagnostic. RADIATION EXPOSURE: 34.7 min. A brachytherapy was performed on  the 80 % lesion in the mid RCA. Following intervention there was a 1 %  residual stenosis. Vessel setup was performed. A 6FR AL.75 LAUNCHER  guiding catheter was used to intubate thevessel. Vessel setup was  performed. A ARSENIO [id number] wire was used to cross the lesion. Balloon  angioplasty was performed, using a 2.5 X 20 EUPHORA balloon, with 10  inflations and a maximum inflation pressure of 20 jony. Balloon angioplasty  was performed, using a 3.00 X 20 NC APEX balloon, with 5 inflations and a  maximum inflation pressure of 20 jony. Balloon angioplasty was performed,  using a 3.50 X 20 EUPHORA NC balloon, with 3 inflations and a maximum  inflation pressure of 20 jony. Balloonangioplasty was performed, with 5  inflations. Balloon angioplasty was performed, using a 3.50 X 20 EUPHORA  NC balloon, with 1 inflations. Intracoronary brachytherapy was performed.  A total dose of 0 Gray was administered to the coronary segment.  Intra-Coronary Brachytherapy. Intravascular Lithotripsy.  CONTRAST GIVEN: Omnipaque 45 ml.  MEDICATIONS GIVEN: Fentanyl, 25 mcg, IV. Midazolam, 1 mg, IV. Labetalol  (Trandate), 20 mg, IV. Heparin, 7500 units, IV. Heparin, 2000 units, IV.  CORONARY VESSELS: The coronary circulation is right dominant.  LM:   --  LM: Angiography showed minor luminal irregularities with no flow  limiting lesions.  LAD:   --  LAD: Angiography showed minor luminal irregularities with no  flow limiting lesions.  CX:   -- Circumflex: Angiography showed minor luminal irregularities with  no flow limiting lesions.  RCA:   --  Mid RCA: There was a 90 % stenosis.  COMPLICATIONS: There were no complications.  DIAGNOSTIC IMPRESSIONS: Patient s/p successful angioplasty to the mid RCA  followed by intracoronary brachytherapy.  DIAGNOSTIC RECOMMENDATIONS: Continue dual antiplatelet therapy.  Continue routine post-cath care.  INTERVENTIONAL IMPRESSIONS: Patient s/p successful angioplasty to the mid  RCA followed by intracoronary brachytherapy.  INTERVENTIONAL RECOMMENDATIONS: Continue dual antiplatelet therapy.  Continue routine post-cath care.  DISPOSITION: The patient left the catheterization laboratory in stable  condition.  Prepared and signed by  Leonardo Alcaraz M.D.  Signed 2021 15:09:28  HEMODYNAMIC TABLES  Outputs:  Baseline  Outputs:  -- CALCULATIONS[age]  Outputs:  -- CALCULATIONS: Body Surface Area: 1.82  Outputs:  -- CALCULATIONS: Height in cm: 165.00  Outputs:  -- CALCULATIONS:Sex: Female  Outputs:  -- CALCULATIONS: Weight in k.40  Outputs:  -- OUTPUTS: O2 consumption: 227.18  Outputs:  -- OUTPUTS: Vo2 Indexed: 125.00 (21 @ 11:46)  Cardiac Cath Lab - Adult:   [facility]  Department of Cardiology  [address] [place] [address]  [phone]  Cath Lab Report -- Comprehensive Report  Patient: LANIE BERTRAND  Study date: 2021  Account number: [id number]  MR number: [id number]  : 1940  Gender: Female  Race: W  Case Physician(s):  Leonardo Alcaraz M.D.  Fellow:  Chuck Crawford MD  Referring Physician:  Kimani Uribe M.D.  INDICATIONS: Patient with s/p recent POBA to severe ISR of the mid RCA  returns today for intracoronary brachytherapy. Unstable angina - CCS3.  HISTORY: The patient has a history of coronary artery disease. The patient  has hypertension, oral hypoglycemic-treated diabetes, and  medication-treated dyslipidemia.  PROCEDURE:  --  Intervention on mid RCA: balloon angioplasty.  TECHNIQUE: The risks and alternatives of the procedures and conscious  sedation were explained to the patient and informed consent was obtained.  Cardiac catheterization performed electively. Coronary intervention  performed electively.  Local anesthetic given. Right radial artery access. RADIATION EXPOSURE:  38.4 min. A balloon angioplasty was performed on the 80 % lesion in the  mid RCA. Following intervention there was a 30 % residual stenosis. There  was no dissection. Vessel setup was performed. A RUNTHROUGH 180CM wire was  used to cross the lesion. Vessel setup was performed. A 6FR JR4 LAUNCHER  guiding catheter was used to intubate the vessel. Balloon angioplasty was  performed, using a 3.00 X 15 EUPHORA NC balloon, with 3 inflations and a  maximum inflation pressure of 20 jony. Balloon angioplasty was performed,  using a 3.50 X 20 EUPHORA NC balloon, with 3 inflations and a maximum  inflation pressure of 20 jony. Balloon angioplasty was performed, using a  4.00 X 15 EUPHORA NC balloon, with 1 inflations and a maximum inflation  pressure of 8 jony. Balloon angioplasty was performed, using a 3.0 X 15  EUPHORA balloon, with 2 inflations and a maximum inflation pressure of 18  jony. Vessel setup was performed. A [employer] UNIVERSAL 190CM wire was used to  cross the lesion. Balloon angioplasty was performed, using a 4.0 X 15  EUPHORA balloon, with 2 inflations and a maximum inflation pressure of 18  jony.  CONTRAST GIVEN: Omnipaque 45 ml.  MEDICATIONS GIVEN: Midazolam, 0.5 mg, IV. Fentanyl, 25 mcg, IV. Midazolam,  0.5 mg, IV. Fentanyl, 25 mcg, IV. Verapamil (Isoptin, Calan, Covera), 2.5  mg, IA. Heparin, 3000 units, IA. Heparin, 4000 units, IV. Heparin, 3000  units, IV.  CORONARY VESSELS:  RCA:   --  Mid RCA: There was a 80 % stenosis. The lesion was mildly  calcified.  COMPLICATIONS: There were no complications.  DIAGNOSTIC IMPRESSIONS: Severe ISR of the mid RCA.  S/P succesful POBA to the lesion.  Unsuccessful deployment of the brachytherapy catheter due to lesion  calcification and recoil following angioplasty.  DIAGNOSTIC RECOMMENDATIONS: Continue current medical therapy.  Plan to repeat procedure with laser atherectomy in 1-2 weeks pending  clinical course.  INTERVENTIONAL IMPRESSIONS: Severe ISR of the mid RCA.  S/P succesful POBA to the lesion.  Unsuccessful deployment of the brachytherapy catheter due to lesion  calcification and recoil following angioplasty.  INTERVENTIONAL RECOMMENDATIONS: Continue current medical therapy.  Plan to repeat procedure with laser atherectomy in 1-2 weeks pending  clinical course.  DISPOSITION: The patient left the catheterization laboratory in stable  condition.  Prepared and signed by  Leonardo Alcaraz M.D.  Signed 2021 15:59:58  HEMODYNAMIC TABLES  Pressures:  Baseline  Pressures:  - HR: 73  Pressures:  - Rhythm:  Pressures:  -- Aortic Pressure (S/D/M): 134/44/77  Outputs:  Baseline  Outputs:  -- CALCULATIONS: [age]  Outputs:  -- CALCULATIONS: Body Surface Area: 1.82  Outputs:  -- CALCULATIONS: Height in cm: 165.00  Outputs:  -- CALCULATIONS: Sex: Female  Outputs:  -- CALCULATIONS: Weight in k.40 (21 @ 12:28)  Cardiac Cath Lab - Adult:   [facility]  Department of Cardiology  [address] [place] [address]  [phone]  Cath Lab Report -- Comprehensive Report  Patient: LANIE BERTRAND  Study date: 2021  Account number: [id number]  MR number: [id number]  : 1940  Gender: Female  Race: W  Case Physician(s):  PAT Cabral M.D.  Fellow:  Raciel Gabriel M.D.  Referring Physician:  Leonardo Alcaraz M.D.  INDICATIONS: Other chest pain.  HISTORY: 80f hx CAD/PAD last cardiac stent , recent RLE PAD stent on  aspirin plavix, HTN, DM presenting with chest pain, and posible NST. The  patient has a history of coronary artery disease. There were no  significant risk factors.  PROCEDURE:  --  Left coronary angiography.  --  Right coronary angiography.  --  Sonosite - Diagnostic.  --  Sheath Exchange for Intervention.  --  Intervention on mid RCA: balloon angioplasty.  TECHNIQUE: The risks and alternatives of the procedures and conscious  sedation were explained to the patient and informed consent was obtained.  Cardiac catheterization performed electively.  Local anesthetic given. Right femoral artery access. Left coronary artery  angiography. The vessel was injected utilizing a catheter. Right coronary  artery angiography. The vessel was injected utilizing a catheter. Sonosite  - Diagnostic. RADIATION EXPOSURE: 7.5 min. A balloon angioplasty was  performed on the 80 % lesion in the mid RCA. Following intervention there  was a 1 % residual stenosis. Vessel setup was performed. A 6FR JR4  LAUNCHER guiding catheter was used to intubate the vessel. Vessel setup  was performed. A ARSENIO [id number] wire was used to cross the lesion. Balloon  angioplasty was performed, using a 2.0 X 20 EUPHORA balloon, with 2  inflations and a maximum inflation pressure of 20 jony. Balloon angioplasty  was performed, using a 3.00 X 20 EUPHORA NC balloon, with 2 inflations.  Sheath Exchange for Intervention.  CONTRAST GIVEN: Omnipaque 20 ml. Omnipaque 30 ml.  MEDICATIONS GIVEN: Fentanyl, 25 mcg, IV. Nicardipine (Cardene), 250 mcg,  intracoronary. Heparin, 7000 units, IV.  CORONARY VESSELS: The coronary circulation is right dominant.  LM:   --  LM: Angiography showed minor luminal irregularities with no flow  limiting lesions.  LAD:   --  LAD: Angiography showed mild atherosclerosis with no flow  limiting lesions.  CX:   --  Circumflex: Angiography showed mild atherosclerosis with no flow  limiting lesions.  RCA:   --  Mid RCA: There was a 80 % stenosis.  COMPLICATIONS: Therewere no complications.  DIAGNOSTIC RECOMMENDATIONS: Successful angioplasty to mRCA severe ISR, with  adequate expansion and FILI 3 flow. IVUS revealing multiple stent layers  at site of lesion. Recommend brachytherapy in 3-4 weeks by Dr. Green.  Plan relayed to patient and patient's cardiologist.  INTERVENTIONAL RECOMMENDATIONS: Successful angioplasty to mRCA severe ISR,  with adequate expansion and FILI 3 flow. IVUS revealing multiple stent  layers at site of lesion. Recommend brachytherapy in 3-4 weeks by Dr. Green. Plan relayed to patient and patient's cardiologist.  Prepared and signed by  Juan Pat M.D.  Signed 2021 20:30:30  HEMODYNAMIC TABLES  Outputs:  Baseline  Outputs:  -- CALCULATIONS: [age]  Outputs:  -- CALCULATIONS: Body Surface Area: 1.82  Outputs:  -- CALCULATIONS: Height in cm: 165.00  Outputs:  -- CALCULATIONS: Sex: Female  Outputs:  -- CALCULATIONS: Weight in k.80 (03-25-21 @ 11:34)  ======================================================  PAST MEDICAL & SURGICAL HISTORY:  Hypertension    Coronary artery disease    Hyperlipidemia    Peripheral artery disease    Myocardial infarct    Peripheral neuropathy    Diabetes    PAD (peripheral artery disease)    CAD (coronary artery disease)

## 2022-08-02 NOTE — PROGRESS NOTE ADULT - ASSESSMENT
Patient is an 82 year old female that presented to ED with abnormal MRI reading (discovered by patient's orthopedic), also with gait/balance issues since April, found to have R temporal brain mass c/f metastatic brain lesion now s/p craniotomy 7/27. While pending surgery, AC held, and on 8/1 presented with CP found to have STEMI on EKG. ASA loaded and started on hep gtt 8/1 and transferred to CCU for further management.    ====================  NEUROLOGY    #Brain lesion  - s/p craniotomy for resection of brain mass 7/27, path showing NSC carcinoma (favor adeno)  - c/w depakote  - c/w dexamethasone  - repeat CTH 1 hour post AC resumption unremarkable  - patient increasingly somnolent this AM, follows commands intermittently, and repeat CTH unchanged from prior  > Will ctm, NSGY aware  - goal -160     #Peripheral neuropathy  - c/w gabapentin    ====================  CARDIOVASCULAR    #CAD s/p PCI  #STEMI  - TTE 7/22 with normal LV internal dimensions with discrete upper septal hypertrophy. EF 55%, inferolateral wall is akinetic.  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  - no indication for pulmonary medications or antibiotics  - Pulm following, appreciate recs    - satting well on RA  - incentive mario    ====================  GI    - CC Diet  - pantoprazole 40 qD  - senna, dulcolax    ====================  ENDOCRINE    #DM  - c/w Lantus 15 qhs, Admelog 7 before breakfast and dinner, Admelog 5 before lunch  - moderate dose correctional scale  - Endo following, appreciate recs     ====================  RENAL    - Retaining - straight cath prn  - Monitor UOP, lytes    ====================  HEMATOLOGY/ONCOLOGY    #Leukocytosis  - iso dexamethasone, no s/s infection  - ctm    #DVT PPx  - c/w ASA, heparin gtt

## 2022-08-02 NOTE — PROGRESS NOTE ADULT - SUBJECTIVE AND OBJECTIVE BOX
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; ataxia; frontal lobe syndrome    INTERVAL HISTORY: chest pain last night -> EKG c/w STEMI - cardiac enzymes are elevated -> catheterization not performed due to recent neurosurgery -> started on ASA and heparin gtt -> now quite lethargic although awakes to state her name and the name of her children; no shortness of breath or hypoxemia on room air; no cough, sputum production, hemoptysis, chest congestion or wheeze; no fevers, chills or sweats; no chest pain/pressure or palpitations; s/p bronchoscopy with endobronchial brushings/biopsies of an obstructing left upper lobe endobronchial lesion -> 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;     REVIEW OF SYSTEMS:  Constitutional: As per interval history  HEENT: Within normal limits  CV: As per interval history  Resp: As per interval history  GI: Within normal limits   : Within normal limits  Musculoskeletal: Within normal limits  Skin: Within normal limits  Neurological: lethargy  Psychiatric: Within normal limits  Endocrine: Within normal limits  Hematologic/Lymphatic: Within normal limits  Allergic/Immunologic: Within normal limits    MEDICATIONS:   Cardiovascular:  losartan 100 milliGRAM(s) Oral daily  metoprolol tartrate 12.5 milliGRAM(s) Oral two times a day    Other:  aspirin enteric coated 81 milliGRAM(s) Oral daily  atorvastatin 80 milliGRAM(s) Oral at bedtime  dexAMETHasone     Tablet 2 milliGRAM(s) Oral every 12 hours  diVALproex  milliGRAM(s) Oral every 8 hours  gabapentin 200 milliGRAM(s) Oral at bedtime  heparin  Infusion 1050 Unit(s)/Hr IV Continuous <Continuous>  insulin glargine Injectable (LANTUS) 15 Unit(s) SubCutaneous at bedtime  insulin lispro (ADMELOG) corrective regimen sliding scale   SubCutaneous Before meals and at bedtime  insulin lispro Injectable (ADMELOG) 5 Unit(s) SubCutaneous before lunch  insulin lispro Injectable (ADMELOG) 7 Unit(s) SubCutaneous before dinner  insulin lispro Injectable (ADMELOG) 7 Unit(s) SubCutaneous before breakfast  metoclopramide Injectable 10 milliGRAM(s) IV Push once  pantoprazole    Tablet 40 milliGRAM(s) Oral before breakfast  polyethylene glycol 3350 17 Gram(s) Oral two times a day  senna 2 Tablet(s) Oral at bedtime  sodium chloride 0.9% Bolus 250 milliLiter(s) IV Bolus once    MEDICATIONS  (PRN):  bisacodyl 5 milliGRAM(s) Oral daily PRN Constipation  melatonin 5 milliGRAM(s) Oral at bedtime PRN Sleep    OBJECTIVE:    POCT Blood Glucose.: 156 mg/dL (02 Aug 2022 07:57)  POCT Blood Glucose.: 187 mg/dL (01 Aug 2022 22:35)  POCT Blood Glucose.: 112 mg/dL (01 Aug 2022 17:34)  POCT Blood Glucose.: 218 mg/dL (01 Aug 2022 11:30)    PHYSICAL EXAM:       ICU Vital Signs Last 24 Hrs  T(C): 36.8 (02 Aug 2022 08:00), Max: 36.9 (01 Aug 2022 19:50)  T(F): 98.2 (02 Aug 2022 08:00), Max: 98.4 (01 Aug 2022 19:50)  HR: 63 (02 Aug 2022 10:00) (63 - 95)  BP: 105/51 (02 Aug 2022 10:00) (77/49 - 177/77)  BP(mean): 74 (02 Aug 2022 10:00) (58 - 123)  ABP: --  ABP(mean): --  RR: 21 (02 Aug 2022 10:00) (18 - 27)  SpO2: 94% (02 Aug 2022 10:00) (94% - 100%) on room air    General: Lethargic. Opens eyes to loud verbal stimuli. No distress. Appears stated age.  HEENT:  Right craniotomy incision stapled. Normocephalic. Anicteric. Normal oral mucosa. PERRL. EOMI.  Neck: Supple. Trachea midline. Thyroid without enlargement/tenderness/nodules. No carotid bruit. No JVD.	  Cardiovascular: Regular rate and rhythm. S1 S2 normal. No murmurs, rubs or gallops.  Respiratory: Respirations unlabored. Clear to auscultation and percussion bilaterally. No curvature.  Abdomen: Soft. Non-tender. Non-distended. No organomegaly. No masses. Normal bowel sounds.  Extremities: Warm to touch. No clubbing or cyanosis. No pedal edema.   Pulses: 2+ peripheral pulses all extremities.	  Skin: Craniotomy scar C/D/I. Abrasion on the forehead at the site of the EEG dressing  Lymph Nodes: Cervical, supraclavicular and axillary nodes normal  Neurological: Moving all extremities. A and O x 1  Psychiatry: Calm    LABS:                        13.4   17.37 )-----------( 303      ( 02 Aug 2022 04:07 )             40.5   CBC    WBC  17.37 <==, 25.02 <==, 19.00 <==, 21.01 <==, 23.88 <==, 21.83 <==, 15.37 <==    Hemoglobin  13.4 <<==, 15.2 <<==, 12.8 <<==, 12.4 <<==, 12.2 <<==, 13.4 <<==, 13.1 <<==    Hematocrit  40.5 <==, 45.4 <==, 38.5 <==, 37.1 <==, 36.3 <==, 39.2 <==, 38.8 <==    Platelets  303 <==, 336 <==, 258 <==, 235 <==, 254 <==, 342 <==, 309 <==    141  |  102  |  60<H>  ----------------------------<  145<H>    08-02  4.5   |  28  |  1.23    LYTES    sodium  141 <==, 136 <==, 141 <==, 139 <==, 140 <==, 138 <==, 142 <==    potassium   4.5 <==, 4.2 <==, 4.4 <==, 4.6 <==, 4.5 <==, 4.1 <==, 4.1 <==    chloride  102 <==, 94 <==, 102 <==, 102 <==, 104 <==, 103 <==, 110 <==    carbon dioxide  28 <==, 25 <==, 28 <==, 26 <==, 25 <==, 18 <==, 20 <==  =============================================================================================  RENAL FUNCTION:    Creatinine:   1.23  <<==, 1.24  <<==, 0.94  <<==, 0.85  <<==, 1.15  <<==, 0.93  <<==, 0.93  <<==    BUN:   60 <==, 55 <==, 42 <==, 34 <==, 31 <==, 38 <==, 43 <==  ============================================================================================  calcium   8.8 <==, 9.0 <==, 9.2 <==, 9.9 <==, 9.1 <==, 9.1 <==, 9.1 <==    phos   3.3 <==, 3.4 <==, 4.2 <==, 4.2 <==    mag   1.7 <==, 1.6 <==, 1.9 <==, 1.8 <==  ============================================================================================  LFTs    AST:   53 <== , 25 <== , 14 <==     ALT:  14  <== , 16  <== , 21  <==     AP:  63  <=, 75  <=, 76  <=    Bili:  0.2  <=, 0.2  <=, 0.2  <=    PT/INR - ( 02 Aug 2022 04:07 )   PT: 11.4 sec;   INR: 0.98 ratio       PTT - ( 02 Aug 2022 04:07 )  PTT:92.3 sec    Serum Pro-Brain Natriuretic Peptide: 1286 pg/mL (08-01 @ 20:29)    CARDIAC MARKERS ( 02 Aug 2022 04:07 )   U/L /CKMB x     /CKMB Units 57.1 ng/mL    troponin 622 ng/L    CARDIAC MARKERS ( 01 Aug 2022 20:29 )   U/L /CKMB x     /CKMB Units 19.7 ng/mL    troponin 285 ng/L    CARDIAC MARKERS ( 01 Aug 2022 15:26 )  CPK 78 U/L /CKMB x     /CKMB Units 2.0 ng/mL    troponin 22 ng/L    < from: Limited Transthoracic Echo (w/Cont) (08.02.22 @ 08:02) >    Patient name: LANIE BERTRAND  YOB: 1940   Age: 82 (F)   MR#: [id number]  Study Date: 8/2/2022  ------------------------------------------------------------------------  Conclusions:  1. Endocardial visualization enhanced with intravenous  injection of Ultrasonic Enhancing Agent (Lumason). Left  ventricle not well visualized despite intravenous  ultrasonic enhancing agent; grossly, preserved left  ventricular systolic function with segmental wall motion  abnormalities. EF approximately 55%. The mid to distal  inferolateral and mid to distal inferior segments are  hypkinetic. The apex is akinetic. No left ventricular  thrombus seen.  *** Compared with echocardiogram of 7/22/2022, results are  similar on today's study. The mid to distal inferior and  the apex appear hypo/akinetic on today's study on images  with intravenous ultrasonic enhancing agent.  ------------------------------------------------------------------------  Confirmed on  8/2/2022 - 10:57:19 by Kelsie Becker M.D.  ------------------------------------------------------------------------  ---------------------------------------------------------------------------------------------------------------  < from: TTE with Doppler (w/Cont) (07.22.22 @ 10:09) >    Patient name: LANIE BERTRAND  YOB: 1940   Age: 82 (F)   MR#: [id number]  Study Date: 7/22/2022  ------------------------------------------------------------------------  Dimensions:    Normal Values:  LA:     3.4    2.0 - 4.0 cm  Ao:     3.1    2.0 - 3.8 cm  SEPTUM: 1.0    0.6 - 1.2 cm  PWT:    0.8    0.6 - 1.1 cm  LVIDd:  4.8    3.0 - 5.6 cm  LVIDs:  3.7    1.8 - 4.0 cm  Derived variables:  LVMI: 82 g/m2  RWT: 0.33  EF (Visual Estimate): 55 %  ------------------------------------------------------------------------  Observations:  Mitral Valve: Mitral annular calcification.  Aortic Valve/Aorta: Normal aortic valve.  Normal aortic root size.  Left Atrium: Normal left atrium.  Left Ventricle: Endocardial visualization enhanced with  intravenous injection of Ultrasonic Enhancing Agent  (Lumason).  Normal left ventricular internal dimensions with discrete  upper septal hypertrophy.  Estimated ejection fraction 55%, The inferolateral wall is  akinetic.  Impaired LV-relaxation with normal filling pressure.  Right Heart: Normal right atrium. Normal right ventricular  size and function.  Normal tricuspid valve.  Normal pulmonic valve. Mild pulmonic regurgitation.  Pericardium/Pleura: Normal pericardium with no pericardial  effusion.  Hemodynamic: Estimated right atrial pressure is normal.  No evidence of pulmonary hypertension.  ------------------------------------------------------------------------  Conclusions:  Endocardial visualization enhanced with intravenous  injection of Ultrasonic Enhancing Agent (Lumason).  Normal left ventricular internal dimensions with discrete  upper septal hypertrophy.  Estimated ejection fraction 55%, The inferolateral wall is  akinetic.  ------------------------------------------------------------------------  Confirmed on  7/22/2022 - 14:42:07 by David Vicente MD, FASE  ------------------------------------------------------------------------  ---------------------------------------------------------------------------------------------------------------  Cytopathology - Non Gyn Report (07.20.22 @ 16:10)   Cytopathology - Non Gyn Report:   LUNG, LEFT, ENDOBRONCHIAL BRUSH   POSITIVE FOR MALIGNANT CELLS.   Non small cell carcinoma favor adenocarcinoma   ---------------------------------------------------------------------------------------------------------------  Surgical Pathology Report (07.20.22 @ 11:53)   Surgical Pathology Report:   1. Left mainstem bronchus biopsy   Final Diagnosis   Bronchus, mainstem, left, biopsy:   In summary the morphology and immunophenotype are consistent with poorly   differentiated adenocarcinoma of lung origin.   Dr. Hopkins was notified of the diagnosis on 07/26/2022.   ---------------------------------------------------------------------------------------------------------------  MICROBIOLOGY:   COVID-19 PCR . (07.18.22 @ 23:25)   COVID-19 PCR: NotDetec    Culture - Bronchial (07.20.22 @ 16:52)   Culture Results:   Normal Respiratory Tamy present     RADIOLOGY:  [x] Chest radiographs reviewed and interpreted by me    EXAM:  CT BRAIN                          PROCEDURE DATE:  08/02/2022      FINDINGS:    Redemonstration of right temporal craniotomy.    Minimally decreased right frontal pneumocephalus.    Similar subdural hemorrhage along the right lateral convexity measuring   up to 7 to 8 mm in greatest depth anteriorly.    Similar extensive edema surrounding the right temporal surgical bed.    Similar leftward midline shift of 6 to 7 mm. Similar partial effacement   of the suprasellar and right perimesencephalic cisterns.    Similar mild asymmetric dilatation of the left lateral ventricle.    Similar low density lesion with mild surrounding edema in the left   parasagittal frontal lobe.    IMPRESSION:    No significant interval change from 8/1/2022.    WENDY MEADE MD; Attending Radiologist  This document has been electronically signed. Aug  2 2022  9:02AM  ---------------------------------------------------------------------------------------------------------------  EXAM:  CT ABDOMEN AND PELVIS IC                        EXAM:  CT CHEST IC                          PROCEDURE DATE:  07/18/2022      FINDINGS:  CHEST:  LUNGS AND LARGE AIRWAYS: There is a heterogeneous left upper lobe mass   approximately measuring 3.3 x 2.6 cm (2, 28) obstructing the left apical   posterior bronchus with partial atelectasis of the left upper lobe. Few   scattered bilateral pulmonary nodules measuring up to 3 mm in the right   upper lobe (2, 39).  PLEURA: No pleural effusion.  VESSELS: Atherosclerotic changes of the aorta and coronary arteries.  HEART: Heart size is normal. No pericardial effusion.  MEDIASTINUM AND SIXTO: Soft tissue contiguous with mass versus adenopathy   measuring 1.7 x 1.5 cm (2, 33).  CHEST WALL AND LOWER NECK: Bilateral subcentimeter hypoattenuating   thyroid nodules.    ABDOMEN AND PELVIS:  LIVER: Within normal limits.  BILE DUCTS: Normal caliber.  GALLBLADDER: Within normal limits.  SPLEEN: Within normal limits.  PANCREAS: Within normal limits.  ADRENALS: Indeterminant left adrenal nodule measuring 1.2 cm.  KIDNEYS/URETERS: No hydronephrosis. Renal cysts and subcentimeter   hypoattenuating foci bilaterally, too small to characterize.    BLADDER: Within normal limits.  REPRODUCTIVE ORGANS: Uterus and adnexa within normal limits.    BOWEL: Colonic diverticula withoutevidence of acute diverticulitis. No   bowel obstruction. Appendix is normal.  PERITONEUM: No ascites.  VESSELS: Atherosclerotic changes.  RETROPERITONEUM/LYMPH NODES: No lymphadenopathy.  ABDOMINAL WALL: Within normal limits.  BONES: Degenerative changes. Grade 1 anterolisthesis of L5 on S1. Status   post right shoulder arthroplasty.    IMPRESSION:  Left upper lobe lung mass with partial atelectasis of the left upper   lobe, concerning for primary lung neoplasm. Few scattered sub-4 mm   pulmonary nodules, indeterminate.    Indeterminant left adrenal nodule for which contrast enhanced MRI is   recommended for further evaluation.    DEEPAK HARRINGTON MD; Resident Radiologist  This document has been electronically signed.  HAYLEY DENTON MD; Attending Radiologist  This document has been electronically signed. Jul 19 2022  9:02AM  ---------------------------------------------------------------------------------------------------------------  EXAM:  CT BRAIN                          PROCEDURE DATE:  07/18/2022      FINDINGS:  A cystic versus centrally necrotic mass in the right temporal lobe   measures approximately 4.8 x 3.2 x 3.2 cm (602:15 and 601:33).    There moderate tolarge amount of vasogenic edema in the right frontal,   parietal and temporal lobes.  There is regional mass effect in the right   cerebral hemisphere with partial effacement of the right lateral   ventricle and third ventricle.  There is medial bulging of the right   thalamus across the midline by up to 1.4 cm (2:17).  There is   approximately 5 mm subfalcine subfalcine herniation of the right frontal   lobe underneath the cerebral falx (2:21).    There is an approximately 1.4 x 1.3 x 1.6 cm cystic versus centimeter   necrotic mass in the medial left frontal lobe.  There appears to be trace   edema surrounding this lesion, without significant mass effect.    There is no evidence of acute intracranial hemorrhage, transtentorial   herniation or acute territorial infarct.  Mild prominence the left   lateral ventricle may be related to pre-existing cerebral volume loss   rather than hydrocephalus.    The paranasal sinuses and mastoids are grossly clear.    The patient is status post cataractlens placement surgery bilaterally.    The calvarium and skull base appear within normal limits.    IMPRESSION:  Cystic versus a necrotic masses measure approximately 4.8 x 3.2 x 3.2 cm   in the right temporal lobe and 1.4 x 1.3 x 1.6 cm in the medial left   frontal lobe.    Moderate to large amount of vasogenic edema in the right frontal,   parietal and temporal lobes.  Trace edema in the left frontal lobe.    Regional mass effect in the right cerebral hemisphere with partial   effacement of theventricular system and 5 mm subfalcine herniation of   the right frontal lobe underneath the cerebral falx.    The findings may represent intracranial metastatic disease versus   glioblastoma.  Contrast-enhanced brain MRI is recommended for further   evaluation.      Findings discussed with DAVID Albarran by Dr. Harrington at 2153 hours on   7/8/2022 with readback confirmation.    DEEPAK HARRINGTON MD; Resident Radiologist  This document has been electronically signed.  PHILLY CISSE MD; Attending Radiologist  This document has been electronically signed. Jul 18 2022 10:15PM  ---------------------------------------------------------------------------------------------------------------  EXAM:  MR BRAIN WAW IC                          PROCEDURE DATE:  07/20/2022      FINDINGS:    Right temporal lobe peripherally enhancing, centrally necrotic 3.1 x 2.6   cm mass demonstrating mild diffusion restriction. There is surrounding   vasogenic edema. 8 mm leftward midline shift. Right uncal herniation.   Additional left parafalcine 1.3 x 0.9 cm rim-enhancing cystic lesion with   mild surrounding vasogenic edema. Tiny 0.3 x 0.3 cm rim-enhancing lesion   within the left frontal cortex (10:125).    Scattered foci of T2/FLAIR hyperintensity in the bilateral hemispheric   white matter are nonspecific but likely related to chronic white matter   microvascular ischemic disease. The left lateral ventricle is mildly   dilated. Flow voids of the major intracranial vessels at the skull base   follow expected course and contour.    The paranasal sinuses demonstrate no signal abnormality. The mastoids   demonstrate no signal abnormality.  Status post bilateral intraocular   lens implants.      IMPRESSION:  Right temporal lobe peripherally enhancing, centrally necrotic 3.1 x 2.6   cm mass demonstrating mild diffusion restriction. There is surrounding   vasogenic edema. 8 mm leftward midline shift. Right uncal herniation.   Additional left parafalcine 1.3 x 0.9 cm rim-enhancing cystic lesion with   mild surrounding vasogenic edema. Tiny 0.3 x 0.3 cm rim-enhancing lesion   within the left frontal cortex. Findings are concerning for metastatic   disease in the context of patient's known primary lung cancer. The left   lateral ventricle is mildly dilated.    NEIL DIOP MD; Resident Radiologist  This document has been electronically signed.  NERI BARRERA MD; Attending Radiologist  This document has been electronically signed. Jul 21 2022  4:26PM  ---------------------------------------------------------------------------------------------------------------  EXAM:  DUPLEX SCAN EXT VEINS LOWER BI                          PROCEDURE DATE:  07/28/2022      IMPRESSION:  No evidence of deep venous thrombosis in either lower extremity.    YENIFER QUAN MD; Attending Radiologist  This document has been electronically signed. Jul 28 2022  2:08PM  ---------------------------------------------------------------------------------------------------------------

## 2022-08-02 NOTE — PROGRESS NOTE ADULT - ASSESSMENT
A/P: 82F presented to ED with abnormal MRI reading (discovered by patient's orthopedic), also with gait/balance issues since April, found to have R temporal brain mass c/f metastatic brain lesion now s/p craniotomy 7/27. While pending surgery, AC held, and on 8/1 presented with CP found to have STEMI on EKG. ASA loaded and started on hep gtt 8/1 and transferred to CCU for further management.    ====================  NEUROLOGY  #Brain lesion  - s/p craniotomy for resection of brain mass 7/27, path showing NSC carcinoma (favor adeno)  - c/w depakote  - c/w dexamethasone  - repeat CTH 1 hour post AC resumption unremarkable  - patient increasingly somnolent this AM, repeat CTH x2 unchanged from prior, improved later in day  > Will ctm, NSGY aware  - goal -160     #Peripheral neuropathy  - c/w gabapentin    ====================  CARDIOVASCULAR    #CAD s/p PCI  #STEMI  - TTE 7/22 with normal LV internal dimensions with discrete upper septal hypertrophy     EF 55%, inferolateral wall is akinetic.  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  - no indication for pulmonary medications or antibiotics  - Pulm following, appreciate recs  - satting well on RA  - incentive mario  ====================  GI  - CC Diet  - pantoprazole 40 qD  - senna, dulcolax    ====================  ENDOCRINE    #DM  - Hyperglycemia, on steriods  - increase Lantus to 20 qhr   - c/w Admelog 7 ac breakfast/dinner  - c/w Admelog 5 ac lunch  - moderate dose correctional scale  - Endo following, appreciate recs     ====================  RENAL  PERCY  - SCr 1.24, c/w strict I/Os  - renally dose meds, monitor on ARB    ====================  HEMATOLOGY/ONCOLOGY  #Leukocytosis  - iso dexamethasone, no s/s infection  - ctm    #DVT PPx  - c/w ASA, on heparin gtt x 48hrs     ====================  INFECTIOUS DISEASES  - No active issues  ======================= DISPOSITION  =====================  [] Critical   [ ] Guarded    [X ] Stable    [ ] Maintain in CICU  [X ] Downgrade to Telemetry  [ ] Discharge Home    Patient requires continuous monitoring with bedside rhythm monitoring, pulse ox monitoring, and intermittent blood gas analysis. Care plan discussed with ICU care team. Patient remained critical and at risk for life threatening decompensation.  Patient seen, examined and plan discussed with CCU team during rounds.     I have personally provided 30 minutes of critical care time excluding time spent on separate procedures, in addition to initial critical care time provided by the CICU Attending, Dr. Junior/ Caroline/ Tari/ Angi/ Rosita/ Vito .       Maddy Hoffmann [facility] [phone] A/P: 82F presented to ED with abnormal MRI reading (discovered by patient's orthopedic), also with gait/balance issues since April, found to have R temporal brain mass c/f metastatic brain lesion now s/p craniotomy 7/27. While pending surgery, AC held, and on 8/1 presented with CP found to have STEMI on EKG. ASA loaded and started on hep gtt 8/1 and transferred to CCU for further management.    ====================  NEUROLOGY  #Brain lesion  - s/p craniotomy for resection of brain mass 7/27, path showing NSC carcinoma (favor adeno)  - c/w depakote  - c/w dexamethasone  - repeat CTH 1 hour post AC resumption unremarkable  - patient increasingly somnolent this AM, repeat CTH x2 unchanged from prior, improved later in day  > Will ctm, NSGY aware  - goal -160     #Peripheral neuropathy  - c/w gabapentin    ====================  CARDIOVASCULAR    #CAD s/p PCI  #STEMI  - TTE 7/22 with normal LV internal dimensions with discrete upper septal hypertrophy     EF 55%, inferolateral wall is akinetic.  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  - no indication for pulmonary medications or antibiotics  - Pulm following, appreciate recs  - satting well on RA  - incentive mario  ====================  GI  - CC Diet  - pantoprazole 40 qD  - senna, dulcolax    ====================  ENDOCRINE    #DM  - Hyperglycemia, on steriods  - increase Lantus to 20 qhr   - c/w Admelog 7 ac breakfast/dinner  - c/w Admelog 5 ac lunch  - moderate dose correctional scale  - Endo following, appreciate recs     ====================  RENAL  PERCY  - SCr 1.24, c/w strict I/Os  - renally dose meds, monitor on ARB    ====================  HEMATOLOGY/ONCOLOGY  #Leukocytosis  - iso dexamethasone, no s/s infection  - ctm    #DVT PPx  - c/w ASA, on heparin gtt x 48hrs     ====================  INFECTIOUS DISEASES  - No active issues  ======================= DISPOSITION  =====================  [] Critical   [ ] Guarded    [X ] Stable    [ ] Maintain in CICU  [X ] Downgrade to Telemetry  [ ] Discharge Home    Patient requires continuous monitoring with bedside rhythm monitoring, pulse ox monitoring, and intermittent blood gas analysis while in CICU awaiting for telemetry bed transfer.  Care plan discussed with ICU care team.Patient seen, examined and plan discussed with CCU team during rounds.  I have personally provided 30 minutes of critical care time excluding time spent on separate procedures, in addition to initial critical care time provided by the CICU Attending, Dr. Junior.      Maddy Hoffmann [facility] [phone]

## 2022-08-02 NOTE — PROGRESS NOTE ADULT - PROBLEM SELECTOR PLAN 1
Will continue Lantus 15u at bedtime.  Will continue Admelog 7/5/7 units before each meal as well as Admelog correction scale coverage. Will continue monitoring FS and FU, will adjust dose as steroids are tapered/dc.  If blood sugars are stable, can DC on current insulin regimen, FU with her Endocrinologist Dr Chaz Richardson 2weeks.  Discussed plan with patient. Counseled that blood sugars will trend down as steroids are tapered/dc, counseled that insulin dose will have to be adjusted based on blood sugars/steroid. Counseled for compliance with consistent low carb diet and exercise as tolerated outpatient.

## 2022-08-02 NOTE — PROGRESS NOTE ADULT - ATTENDING COMMENTS
Admitted with brain mass s/p resection  On 8/1 was having chest pain and was found to have an inferior STEMI  Not a PCI candidate due to recent brain surgery  Transferred to the CICU for medical management  Continue Depakote, Decadron per Neuro; CT head has not shown bleed after starting Heparin  Continue ASA, holding P2Y12 inhibitor due to bleeding risk  Hemodynamics acceptable, chest pain free - start beta blocker  Check TTE  Trend cardiac enzymes until peak  O2 sats mid 90s on nasal cannula  Normal renal function, compensated on exam  H/H acceptable on Heparin drip for ACS  COVID negative, no antibiotics   Sugars controlled on Lantus  No central access Admitted with brain mass s/p resection  On 8/1 was having chest pain and was found to have an inferior STEMI  Not a PCI candidate due to recent brain surgery  Transferred to the CICU for medical management  Continue Depakote, Decadron per Neuro; CT head has not shown bleed after starting Heparin  Continue ASA, holding P2Y12 inhibitor due to bleeding risk  Borderline hypotension, chest pain free - transition Coreg to Lopressor, continue Losartan  Check TTE  Trend cardiac enzymes until peak  O2 sats mid 90s on nasal cannula - wean to room air  Normal renal function, compensated on exam  H/H acceptable on Heparin drip for ACS  COVID negative, no antibiotics   Sugars controlled on Lantus  No central access Admitted with brain mass s/p resection  On 8/1 was having chest pain and was found to have an inferior STEMI  Not a PCI candidate due to recent brain surgery  Transferred to the CICU for medical management  Continue Depakote, Decadron per Neuro; CT head has not shown bleed after starting Heparin  Continue ASA, holding P2Y12 inhibitor due to bleeding risk  Borderline hypotension, chest pain free - transition Coreg to Lopressor, continue Losartan  Target -160 per Neurosurgery  Check TTE  Trend cardiac enzymes until peak  O2 sats mid 90s on nasal cannula - wean to room air  Normal renal function, compensated on exam  H/H acceptable on Heparin drip for ACS  COVID negative, no antibiotics   Sugars controlled on Lantus  No central access

## 2022-08-03 LAB
ALBUMIN SERPL ELPH-MCNC: 2.8 G/DL — LOW (ref 3.3–5)
ALP SERPL-CCNC: 66 U/L — SIGNIFICANT CHANGE UP (ref 40–120)
ALT FLD-CCNC: 16 U/L — SIGNIFICANT CHANGE UP (ref 10–45)
ANION GAP SERPL CALC-SCNC: 13 MMOL/L — SIGNIFICANT CHANGE UP (ref 5–17)
APPEARANCE UR: CLEAR — SIGNIFICANT CHANGE UP
APTT BLD: 66.8 SEC — HIGH (ref 27.5–35.5)
APTT BLD: 67.6 SEC — HIGH (ref 27.5–35.5)
APTT BLD: 93.5 SEC — HIGH (ref 27.5–35.5)
AST SERPL-CCNC: 57 U/L — HIGH (ref 10–40)
BACTERIA # UR AUTO: ABNORMAL
BASOPHILS # BLD AUTO: 0.04 K/UL — SIGNIFICANT CHANGE UP (ref 0–0.2)
BASOPHILS NFR BLD AUTO: 0.2 % — SIGNIFICANT CHANGE UP (ref 0–2)
BILIRUB SERPL-MCNC: 0.2 MG/DL — SIGNIFICANT CHANGE UP (ref 0.2–1.2)
BILIRUB UR-MCNC: NEGATIVE — SIGNIFICANT CHANGE UP
BUN SERPL-MCNC: 56 MG/DL — HIGH (ref 7–23)
CALCIUM SERPL-MCNC: 8.9 MG/DL — SIGNIFICANT CHANGE UP (ref 8.4–10.5)
CHLORIDE SERPL-SCNC: 102 MMOL/L — SIGNIFICANT CHANGE UP (ref 96–108)
CK MB BLD-MCNC: 9 % — HIGH (ref 0–3.5)
CK MB BLD-MCNC: 9 % — HIGH (ref 0–3.5)
CK MB CFR SERPL CALC: 29.3 NG/ML — HIGH (ref 0–3.8)
CK MB CFR SERPL CALC: 32 NG/ML — HIGH (ref 0–3.8)
CK SERPL-CCNC: 326 U/L — HIGH (ref 25–170)
CK SERPL-CCNC: 355 U/L — HIGH (ref 25–170)
CO2 SERPL-SCNC: 25 MMOL/L — SIGNIFICANT CHANGE UP (ref 22–31)
COLOR SPEC: SIGNIFICANT CHANGE UP
CREAT SERPL-MCNC: 1.28 MG/DL — SIGNIFICANT CHANGE UP (ref 0.5–1.3)
DIFF PNL FLD: NEGATIVE — SIGNIFICANT CHANGE UP
EGFR: 42 ML/MIN/1.73M2 — LOW
EOSINOPHIL # BLD AUTO: 0.01 K/UL — SIGNIFICANT CHANGE UP (ref 0–0.5)
EOSINOPHIL NFR BLD AUTO: 0.1 % — SIGNIFICANT CHANGE UP (ref 0–6)
EPI CELLS # UR: 0 /HPF — SIGNIFICANT CHANGE UP
GAS PNL BLDA: SIGNIFICANT CHANGE UP
GLUCOSE BLDC GLUCOMTR-MCNC: 111 MG/DL — HIGH (ref 70–99)
GLUCOSE BLDC GLUCOMTR-MCNC: 139 MG/DL — HIGH (ref 70–99)
GLUCOSE BLDC GLUCOMTR-MCNC: 173 MG/DL — HIGH (ref 70–99)
GLUCOSE BLDC GLUCOMTR-MCNC: 189 MG/DL — HIGH (ref 70–99)
GLUCOSE SERPL-MCNC: 169 MG/DL — HIGH (ref 70–99)
GLUCOSE UR QL: ABNORMAL
HCT VFR BLD CALC: 40.4 % — SIGNIFICANT CHANGE UP (ref 34.5–45)
HGB BLD-MCNC: 13.2 G/DL — SIGNIFICANT CHANGE UP (ref 11.5–15.5)
HYALINE CASTS # UR AUTO: 1 /LPF — SIGNIFICANT CHANGE UP (ref 0–2)
IMM GRANULOCYTES NFR BLD AUTO: 1.8 % — HIGH (ref 0–1.5)
INR BLD: 1.02 RATIO — SIGNIFICANT CHANGE UP (ref 0.88–1.16)
KETONES UR-MCNC: SIGNIFICANT CHANGE UP
LACTATE SERPL-SCNC: 2.5 MMOL/L — HIGH (ref 0.7–2)
LEUKOCYTE ESTERASE UR-ACNC: ABNORMAL
LYMPHOCYTES # BLD AUTO: 1.9 K/UL — SIGNIFICANT CHANGE UP (ref 1–3.3)
LYMPHOCYTES # BLD AUTO: 10.1 % — LOW (ref 13–44)
MAGNESIUM SERPL-MCNC: 2.2 MG/DL — SIGNIFICANT CHANGE UP (ref 1.6–2.6)
MCHC RBC-ENTMCNC: 30.6 PG — SIGNIFICANT CHANGE UP (ref 27–34)
MCHC RBC-ENTMCNC: 32.7 GM/DL — SIGNIFICANT CHANGE UP (ref 32–36)
MCV RBC AUTO: 93.5 FL — SIGNIFICANT CHANGE UP (ref 80–100)
MONOCYTES # BLD AUTO: 1.02 K/UL — HIGH (ref 0–0.9)
MONOCYTES NFR BLD AUTO: 5.4 % — SIGNIFICANT CHANGE UP (ref 2–14)
NEUTROPHILS # BLD AUTO: 15.55 K/UL — HIGH (ref 1.8–7.4)
NEUTROPHILS NFR BLD AUTO: 82.4 % — HIGH (ref 43–77)
NITRITE UR-MCNC: NEGATIVE — SIGNIFICANT CHANGE UP
NRBC # BLD: 0 /100 WBCS — SIGNIFICANT CHANGE UP (ref 0–0)
NT-PROBNP SERPL-SCNC: 7011 PG/ML — HIGH (ref 0–300)
NT-PROBNP SERPL-SCNC: 7944 PG/ML — HIGH (ref 0–300)
PH UR: 6 — SIGNIFICANT CHANGE UP (ref 5–8)
PHOSPHATE SERPL-MCNC: 2.7 MG/DL — SIGNIFICANT CHANGE UP (ref 2.5–4.5)
PLATELET # BLD AUTO: 230 K/UL — SIGNIFICANT CHANGE UP (ref 150–400)
POTASSIUM SERPL-MCNC: 4.5 MMOL/L — SIGNIFICANT CHANGE UP (ref 3.5–5.3)
POTASSIUM SERPL-SCNC: 4.5 MMOL/L — SIGNIFICANT CHANGE UP (ref 3.5–5.3)
PROCALCITONIN SERPL-MCNC: 0.11 NG/ML — HIGH (ref 0.02–0.1)
PROLACTIN SERPL-MCNC: 13.4 NG/ML — SIGNIFICANT CHANGE UP (ref 3.4–24.1)
PROT SERPL-MCNC: 5.5 G/DL — LOW (ref 6–8.3)
PROT UR-MCNC: NEGATIVE — SIGNIFICANT CHANGE UP
PROTHROM AB SERPL-ACNC: 11.8 SEC — SIGNIFICANT CHANGE UP (ref 10.5–13.4)
RBC # BLD: 4.32 M/UL — SIGNIFICANT CHANGE UP (ref 3.8–5.2)
RBC # FLD: 12.4 % — SIGNIFICANT CHANGE UP (ref 10.3–14.5)
RBC CASTS # UR COMP ASSIST: 1 /HPF — SIGNIFICANT CHANGE UP (ref 0–4)
SODIUM SERPL-SCNC: 140 MMOL/L — SIGNIFICANT CHANGE UP (ref 135–145)
SP GR SPEC: 1.01 — SIGNIFICANT CHANGE UP (ref 1.01–1.02)
TROPONIN T, HIGH SENSITIVITY RESULT: 1120 NG/L — HIGH (ref 0–51)
TROPONIN T, HIGH SENSITIVITY RESULT: 1121 NG/L — HIGH (ref 0–51)
UROBILINOGEN FLD QL: NEGATIVE — SIGNIFICANT CHANGE UP
VALPROATE SERPL-MCNC: 102 UG/ML — HIGH (ref 50–100)
WBC # BLD: 18.86 K/UL — HIGH (ref 3.8–10.5)
WBC # FLD AUTO: 18.86 K/UL — HIGH (ref 3.8–10.5)
WBC UR QL: 51 /HPF — HIGH (ref 0–5)

## 2022-08-03 PROCEDURE — 99233 SBSQ HOSP IP/OBS HIGH 50: CPT

## 2022-08-03 PROCEDURE — 99291 CRITICAL CARE FIRST HOUR: CPT | Mod: 25

## 2022-08-03 PROCEDURE — 93010 ELECTROCARDIOGRAM REPORT: CPT

## 2022-08-03 PROCEDURE — 95720 EEG PHY/QHP EA INCR W/VEEG: CPT

## 2022-08-03 PROCEDURE — 70450 CT HEAD/BRAIN W/O DYE: CPT | Mod: 26

## 2022-08-03 PROCEDURE — 71275 CT ANGIOGRAPHY CHEST: CPT | Mod: 26

## 2022-08-03 PROCEDURE — 93010 ELECTROCARDIOGRAM REPORT: CPT | Mod: 77

## 2022-08-03 PROCEDURE — 71045 X-RAY EXAM CHEST 1 VIEW: CPT | Mod: 26

## 2022-08-03 RX ORDER — HEPARIN SODIUM 5000 [USP'U]/ML
650 INJECTION INTRAVENOUS; SUBCUTANEOUS
Qty: 25000 | Refills: 0 | Status: DISCONTINUED | OUTPATIENT
Start: 2022-08-03 | End: 2022-08-04

## 2022-08-03 RX ORDER — NOREPINEPHRINE BITARTRATE/D5W 8 MG/250ML
0.05 PLASTIC BAG, INJECTION (ML) INTRAVENOUS
Qty: 8 | Refills: 0 | Status: DISCONTINUED | OUTPATIENT
Start: 2022-08-03 | End: 2022-08-04

## 2022-08-03 RX ORDER — VALPROIC ACID (AS SODIUM SALT) 250 MG/5ML
500 SOLUTION, ORAL ORAL EVERY 8 HOURS
Refills: 0 | Status: DISCONTINUED | OUTPATIENT
Start: 2022-08-03 | End: 2022-08-03

## 2022-08-03 RX ORDER — CEFTRIAXONE 500 MG/1
1000 INJECTION, POWDER, FOR SOLUTION INTRAMUSCULAR; INTRAVENOUS EVERY 24 HOURS
Refills: 0 | Status: DISCONTINUED | OUTPATIENT
Start: 2022-08-03 | End: 2022-08-03

## 2022-08-03 RX ORDER — SODIUM CHLORIDE 9 MG/ML
500 INJECTION INTRAMUSCULAR; INTRAVENOUS; SUBCUTANEOUS ONCE
Refills: 0 | Status: COMPLETED | OUTPATIENT
Start: 2022-08-03 | End: 2022-08-03

## 2022-08-03 RX ORDER — PIPERACILLIN AND TAZOBACTAM 4; .5 G/20ML; G/20ML
3.38 INJECTION, POWDER, LYOPHILIZED, FOR SOLUTION INTRAVENOUS ONCE
Refills: 0 | Status: COMPLETED | OUTPATIENT
Start: 2022-08-03 | End: 2022-08-03

## 2022-08-03 RX ORDER — DEXAMETHASONE 0.5 MG/5ML
2 ELIXIR ORAL EVERY 12 HOURS
Refills: 0 | Status: DISCONTINUED | OUTPATIENT
Start: 2022-08-03 | End: 2022-08-04

## 2022-08-03 RX ORDER — PIPERACILLIN AND TAZOBACTAM 4; .5 G/20ML; G/20ML
3.38 INJECTION, POWDER, LYOPHILIZED, FOR SOLUTION INTRAVENOUS EVERY 8 HOURS
Refills: 0 | Status: DISCONTINUED | OUTPATIENT
Start: 2022-08-03 | End: 2022-08-07

## 2022-08-03 RX ORDER — ASPIRIN/CALCIUM CARB/MAGNESIUM 324 MG
300 TABLET ORAL ONCE
Refills: 0 | Status: DISCONTINUED | OUTPATIENT
Start: 2022-08-03 | End: 2022-08-03

## 2022-08-03 RX ORDER — BRIVARACETAM 25 MG/1
100 TABLET, FILM COATED ORAL
Refills: 0 | Status: DISCONTINUED | OUTPATIENT
Start: 2022-08-03 | End: 2022-08-10

## 2022-08-03 RX ORDER — BACITRACIN ZINC 500 UNIT/G
1 OINTMENT IN PACKET (EA) TOPICAL
Refills: 0 | Status: DISCONTINUED | OUTPATIENT
Start: 2022-08-03 | End: 2022-08-10

## 2022-08-03 RX ADMIN — Medication 2: at 18:24

## 2022-08-03 RX ADMIN — SODIUM CHLORIDE 1000 MILLILITER(S): 9 INJECTION INTRAMUSCULAR; INTRAVENOUS; SUBCUTANEOUS at 23:00

## 2022-08-03 RX ADMIN — PIPERACILLIN AND TAZOBACTAM 25 GRAM(S): 4; .5 INJECTION, POWDER, LYOPHILIZED, FOR SOLUTION INTRAVENOUS at 17:47

## 2022-08-03 RX ADMIN — HEPARIN SODIUM 7 UNIT(S)/HR: 5000 INJECTION INTRAVENOUS; SUBCUTANEOUS at 05:30

## 2022-08-03 RX ADMIN — BRIVARACETAM 240 MILLIGRAM(S): 25 TABLET, FILM COATED ORAL at 17:46

## 2022-08-03 RX ADMIN — Medication 1 APPLICATION(S): at 13:16

## 2022-08-03 RX ADMIN — PIPERACILLIN AND TAZOBACTAM 200 GRAM(S): 4; .5 INJECTION, POWDER, LYOPHILIZED, FOR SOLUTION INTRAVENOUS at 12:57

## 2022-08-03 RX ADMIN — Medication 2 MILLIGRAM(S): at 05:51

## 2022-08-03 RX ADMIN — Medication 2 MILLIGRAM(S): at 17:47

## 2022-08-03 RX ADMIN — HEPARIN SODIUM 6.5 UNIT(S)/HR: 5000 INJECTION INTRAVENOUS; SUBCUTANEOUS at 22:22

## 2022-08-03 RX ADMIN — Medication 81 MILLIGRAM(S): at 19:03

## 2022-08-03 RX ADMIN — Medication 12.5 MILLIGRAM(S): at 17:47

## 2022-08-03 RX ADMIN — POLYETHYLENE GLYCOL 3350 17 GRAM(S): 17 POWDER, FOR SOLUTION ORAL at 17:47

## 2022-08-03 RX ADMIN — Medication 55 MILLIGRAM(S): at 05:51

## 2022-08-03 NOTE — PROGRESS NOTE ADULT - ASSESSMENT
Assessment  DMT2: 82y Female with DM T2 with hyperglycemia, A1C 8.4%, was on oral meds and insulin at home, now on basal bolus insulin, bolus dose held this AM, blood sugars are trending within overall acceptable range, no hypoglycemias, steroid taper in progress, transferred to ccu s/p chest pain/STEMI now with changes in mental status.  Brain Mass: postop, on meds, monitored, FU Neurosurgery.  Lung Mass: s/p bronchoscopy, CA workup in progress, monitored.  Thyroid Nodules: seen on imaging, B/L subcentimeter thyroid nodules, she denies neck mass/pain/dysphagia, euthyroid, reports known history and is being followed by Dr. Richardson.  Adrenal Nodule: seen on imaging, 1.2 cm left thyroid nodule, unknown history. AM cortisol suppressed 2/2 current steroid use, aldosterone and metanephrine labs in normal range.        Troy Morelia ESPINOZA  [phone]

## 2022-08-03 NOTE — PROGRESS NOTE ADULT - SUBJECTIVE AND OBJECTIVE BOX
Chief complaint  Patient is a 82y old  Female who presents with a chief complaint of abnormal imaging (03 Aug 2022 12:05)   Review of systems  Events noted, change in mental status, no hypoglycemic episodes.    Labs and Fingersticks  CAPILLARY BLOOD GLUCOSE      POCT Blood Glucose.: 139 mg/dL (03 Aug 2022 04:45)  POCT Blood Glucose.: 318 mg/dL (02 Aug 2022 22:27)  POCT Blood Glucose.: 255 mg/dL (02 Aug 2022 17:55)  POCT Blood Glucose.: 130 mg/dL (02 Aug 2022 13:20)      Anion Gap, Serum: 13 (08-03 @ 04:07)  Anion Gap, Serum: 11 (08-02 @ 04:07)  Anion Gap, Serum: 17 (08-01 @ 20:29)      Calcium, Total Serum: 8.9 (08-03 @ 04:07)  Calcium, Total Serum: 8.8 (08-02 @ 04:07)  Calcium, Total Serum: 9.0 (08-01 @ 20:29)  Albumin, Serum: 2.8 *L* (08-03 @ 04:07)  Albumin, Serum: 2.9 *L* (08-02 @ 04:07)  Albumin, Serum: 3.4 (08-01 @ 20:29)    Alanine Aminotransferase (ALT/SGPT): 16 (08-03 @ 04:07)  Alanine Aminotransferase (ALT/SGPT): 14 (08-02 @ 04:07)  Alanine Aminotransferase (ALT/SGPT): 16 (08-01 @ 20:29)  Alkaline Phosphatase, Serum: 66 (08-03 @ 04:07)  Alkaline Phosphatase, Serum: 63 (08-02 @ 04:07)  Alkaline Phosphatase, Serum: 75 (08-01 @ 20:29)  Aspartate Aminotransferase (AST/SGOT): 57 *H* (08-03 @ 04:07)  Aspartate Aminotransferase (AST/SGOT): 53 *H* (08-02 @ 04:07)  Aspartate Aminotransferase (AST/SGOT): 25 (08-01 @ 20:29)        08-03    140  |  102  |  56<H>  ----------------------------<  169<H>  4.5   |  25  |  1.28    Ca    8.9      03 Aug 2022 04:07  Phos  2.7     08-03  Mg     2.2     08-03    TPro  5.5<L>  /  Alb  2.8<L>  /  TBili  0.2  /  DBili  x   /  AST  57<H>  /  ALT  16  /  AlkPhos  66  08-03                        13.2   18.86 )-----------( 230      ( 03 Aug 2022 04:07 )             40.4     Medications  MEDICATIONS  (STANDING):  aspirin enteric coated 81 milliGRAM(s) Oral daily  atorvastatin 80 milliGRAM(s) Oral at bedtime  BACItracin   Ointment 1 Application(s) Topical two times a day  brivaracetam  IVPB 100 milliGRAM(s) IV Intermittent two times a day  dexAMETHasone  Injectable 2 milliGRAM(s) IV Push every 12 hours  gabapentin 200 milliGRAM(s) Oral at bedtime  heparin  Infusion 1050 Unit(s)/Hr (7 mL/Hr) IV Continuous <Continuous>  insulin glargine Injectable (LANTUS) 20 Unit(s) SubCutaneous at bedtime  insulin lispro (ADMELOG) corrective regimen sliding scale   SubCutaneous Before meals and at bedtime  insulin lispro Injectable (ADMELOG) 5 Unit(s) SubCutaneous before lunch  insulin lispro Injectable (ADMELOG) 7 Unit(s) SubCutaneous before breakfast  insulin lispro Injectable (ADMELOG) 7 Unit(s) SubCutaneous before dinner  losartan 100 milliGRAM(s) Oral daily  metoprolol tartrate 12.5 milliGRAM(s) Oral two times a day  pantoprazole    Tablet 40 milliGRAM(s) Oral before breakfast  piperacillin/tazobactam IVPB. 3.375 Gram(s) IV Intermittent once  piperacillin/tazobactam IVPB.. 3.375 Gram(s) IV Intermittent every 8 hours  polyethylene glycol 3350 17 Gram(s) Oral two times a day  senna 2 Tablet(s) Oral at bedtime      Physical Exam  Vital Signs Last 12 Hrs  T(F): 98.1 (08-03-22 @ 06:00), Max: 98.1 (08-03-22 @ 06:00)  HR: 73 (08-03-22 @ 10:00) (71 - 80)  BP: 118/58 (08-03-22 @ 10:00) (94/53 - 150/66)  BP(mean): 84 (08-03-22 @ 10:00) (71 - 95)  RR: 23 (08-03-22 @ 10:00) (22 - 28)  SpO2: 99% (08-03-22 @ 10:00) (94% - 99%)    Diagnostics    Metanephrine, Plasma: AM Sched. Collection: 21-Jul-2022 06:00 (07-20 @ 10:54)  Aldosterone, Serum: AM Sched. Collection: 21-Jul-2022 06:00 (07-20 @ 10:54)  Cortisol AM, Serum: AM Sched. Collection: 21-Jul-2022 06:00 (07-20 @ 10:54)

## 2022-08-03 NOTE — PROVIDER CONTACT NOTE (CHANGE IN STATUS NOTIFICATION) - ASSESSMENT
Pt. lethargic/obtunded. Opening eyes briefly but no sustained to voice, withdraws from pain, unable to sustain awakening to follow commands, no verbal response. Pt. moving legs/arms to move side to side to sleep but unable to follow commands. VSS. Blood sugar 139. Pupils remain reactive +2 brisk.

## 2022-08-03 NOTE — PROGRESS NOTE ADULT - SUBJECTIVE AND OBJECTIVE BOX
Patient seen and examined at bedside.    --Anticoagulation--  aspirin enteric coated 81 milliGRAM(s) Oral daily  heparin  Infusion 1050 Unit(s)/Hr IV Continuous <Continuous>    T(C): 36.4 (08-02-22 @ 19:00), Max: 36.8 (08-02-22 @ 03:00)  HR: 75 (08-03-22 @ 02:00) (59 - 83)  BP: 143/65 (08-03-22 @ 02:00) (77/48 - 156/97)  RR: 25 (08-03-22 @ 02:00) (16 - 32)  SpO2: 97% (08-03-22 @ 02:00) (93% - 100%)  Wt(kg): --    Exam overnight: EOV, pupils 3R, no gaze preference. But not answering orientation questions, no FC, says "ouch" to noxious stimulus. Wds all extremities AG to nox.

## 2022-08-03 NOTE — PROGRESS NOTE ADULT - ASSESSMENT
82F, s/p Right crani for tumor resection  -Adm CICU, q1h neurochecks  -Per CICU, on ASA81, hep gtt PTT goal 60-90, no need for Cangrelor  -Latest PTT 93.5 (goal 60-90)  -VEEG d/t concern for seizure activity  -Further steroid dosing/taper per neuro-onc

## 2022-08-03 NOTE — PROGRESS NOTE ADULT - SUBJECTIVE AND OBJECTIVE BOX
DATE OF SERVICE: 22 @ 12:06    Patient is a 82y old  Female who presents with a chief complaint of abnormal imaging (03 Aug 2022 09:28)      SUBJECTIVE / OVERNIGHT EVENTS:  seen in ccu, pt is lethargic    MEDICATIONS  (STANDING):  aspirin enteric coated 81 milliGRAM(s) Oral daily  atorvastatin 80 milliGRAM(s) Oral at bedtime  BACItracin   Ointment 1 Application(s) Topical two times a day  brivaracetam  IVPB 100 milliGRAM(s) IV Intermittent two times a day  dexAMETHasone  Injectable 2 milliGRAM(s) IV Push every 12 hours  gabapentin 200 milliGRAM(s) Oral at bedtime  heparin  Infusion 1050 Unit(s)/Hr (7 mL/Hr) IV Continuous <Continuous>  insulin glargine Injectable (LANTUS) 20 Unit(s) SubCutaneous at bedtime  insulin lispro (ADMELOG) corrective regimen sliding scale   SubCutaneous Before meals and at bedtime  insulin lispro Injectable (ADMELOG) 5 Unit(s) SubCutaneous before lunch  insulin lispro Injectable (ADMELOG) 7 Unit(s) SubCutaneous before breakfast  insulin lispro Injectable (ADMELOG) 7 Unit(s) SubCutaneous before dinner  losartan 100 milliGRAM(s) Oral daily  metoprolol tartrate 12.5 milliGRAM(s) Oral two times a day  pantoprazole    Tablet 40 milliGRAM(s) Oral before breakfast  piperacillin/tazobactam IVPB. 3.375 Gram(s) IV Intermittent once  piperacillin/tazobactam IVPB.. 3.375 Gram(s) IV Intermittent every 8 hours  polyethylene glycol 3350 17 Gram(s) Oral two times a day  senna 2 Tablet(s) Oral at bedtime    MEDICATIONS  (PRN):  bisacodyl 5 milliGRAM(s) Oral daily PRN Constipation  melatonin 5 milliGRAM(s) Oral at bedtime PRN Sleep      Vital Signs Last 24 Hrs  T(C): 36.7 (03 Aug 2022 06:00), Max: 36.7 (03 Aug 2022 06:00)  T(F): 98.1 (03 Aug 2022 06:00), Max: 98.1 (03 Aug 2022 06:00)  HR: 73 (03 Aug 2022 10:00) (63 - 83)  BP: 118/58 (03 Aug 2022 10:00) (94/53 - 156/97)  BP(mean): 84 (03 Aug 2022 10:00) (70 - 121)  RR: 23 (03 Aug 2022 10:00) (16 - 32)  SpO2: 99% (03 Aug 2022 10:00) (93% - 99%)    Parameters below as of 03 Aug 2022 10:00  Patient On (Oxygen Delivery Method): nasal cannula  O2 Flow (L/min): 2    CAPILLARY BLOOD GLUCOSE      POCT Blood Glucose.: 139 mg/dL (03 Aug 2022 04:45)  POCT Blood Glucose.: 318 mg/dL (02 Aug 2022 22:27)  POCT Blood Glucose.: 255 mg/dL (02 Aug 2022 17:55)  POCT Blood Glucose.: 130 mg/dL (02 Aug 2022 13:20)    I&O's Summary    02 Aug 2022 07:01  -  03 Aug 2022 07:00  --------------------------------------------------------  IN: 744 mL / OUT: 1100 mL / NET: -356 mL    03 Aug 2022 07:01  -  03 Aug 2022 12:06  --------------------------------------------------------  IN: 21 mL / OUT: 900 mL / NET: -879 mL        PHYSICAL EXAM:  GENERAL: NAD, well-developed  HEAD:  Atraumatic, Normocephalic  EYES: EOMI, PERRLA, conjunctiva and sclera clear  NECK: Supple, No JVD  CHEST/LUNG: Clear to auscultation bilaterally; No wheeze  HEART: Regular rate and rhythm; No murmurs, rubs, or gallops  ABDOMEN: Soft, Nontender, Nondistended; Bowel sounds present  EXTREMITIES:  2+ Peripheral Pulses, No clubbing, cyanosis, or edema  NEUROLOGY: non-focal  SKIN: No rashes or lesions    LABS:                        13.2   18.86 )-----------( 230      ( 03 Aug 2022 04:07 )             40.4     08-03    140  |  102  |  56<H>  ----------------------------<  169<H>  4.5   |  25  |  1.28    Ca    8.9      03 Aug 2022 04:07  Phos  2.7     08-03  Mg     2.2     08-03    TPro  5.5<L>  /  Alb  2.8<L>  /  TBili  0.2  /  DBili  x   /  AST  57<H>  /  ALT  16  /  AlkPhos  66  08-03    PT/INR - ( 03 Aug 2022 04:07 )   PT: 11.8 sec;   INR: 1.02 ratio         PTT - ( 03 Aug 2022 04:07 )  PTT:93.5 sec  CARDIAC MARKERS ( 03 Aug 2022 08:57 )  x     / x     / 326 U/L / x     / 29.3 ng/mL  CARDIAC MARKERS ( 03 Aug 2022 07:45 )  x     / x     / 355 U/L / x     / 32.0 ng/mL  CARDIAC MARKERS ( 02 Aug 2022 17:12 )  x     / x     / 466 U/L / x     / 63.2 ng/mL  CARDIAC MARKERS ( 02 Aug 2022 11:09 )  x     / x     / 470 U/L / x     / 72.7 ng/mL  CARDIAC MARKERS ( 02 Aug 2022 04:07 )  x     / x     / 386 U/L / x     / 57.1 ng/mL  CARDIAC MARKERS ( 01 Aug 2022 20:29 )  x     / x     / 171 U/L / x     / 19.7 ng/mL  CARDIAC MARKERS ( 01 Aug 2022 15:26 )  x     / x     / 78 U/L / x     / 2.0 ng/mL      Urinalysis Basic - ( 03 Aug 2022 08:40 )    Color: Light Yellow / Appearance: Clear / S.015 / pH: x  Gluc: x / Ketone: Trace  / Bili: Negative / Urobili: Negative   Blood: x / Protein: Negative / Nitrite: Negative   Leuk Esterase: Large / RBC: 1 /hpf / WBC 51 /HPF   Sq Epi: x / Non Sq Epi: 0 /hpf / Bacteria: Many    < from: CT Head No Cont (22 @ 04:38) >  IMPRESSION:    Redemonstration of right temporal craniotomy for prior resection of a   lesion. There is a large degree of hypoattenuation throughout the right   temporal lobe and extending into the right basal ganglia and right   parietal lobe, suggestive of a combination of vasogenic edema and/or   encephalomalacia/gliosis. This results in secondary mass effect upon the   parenchyma of the right cerebral hemisphere, not significantly changed.   There is mild mass effect on the ventricular system, right greater than   left. There is secondary right to left midline shift of approximately 5.7   mm. Expected postsurgical extra-axial pneumocephalus is reidentified   without significant change. Tiny expected subdural hematoma overlying the   right anterior superior frontal lobe is reidentified without change.    Reidentified is a hypoattenuating lesion within the left medial frontal   lobe measuring 1.4 x 1.6 cm, compatible with known additional metastasis.   No significant surrounding edema.    < end of copied text >      RADIOLOGY & ADDITIONAL TESTS:    Imaging Personally Reviewed:    Consultant(s) Notes Reviewed:      Care Discussed with Consultants/Other Providers:

## 2022-08-03 NOTE — PROGRESS NOTE ADULT - ASSESSMENT
====================  Assessment & Plan:   Patient is an 82 year old female that presented to ED with abnormal MRI reading (discovered by patient's orthopedic), also with gait/balance issues since April, found to have R temporal brain mass c/f metastatic brain lesion now s/p craniotomy 7/27. While pending surgery, AC held, and on 8/1 presented with CP found to have STEMI on EKG. ASA loaded and started on hep gtt 8/1 and transferred to CCU for further management.    ====================  NEUROLOGY    #Brain lesion  - s/p craniotomy for resection of brain mass 7/27, path showing NSC carcinoma (favor adeno)  - c/w depakote  - c/w dexamethasone  - repeat CTH 1 hour post AC resumption unremarkable  - patient increasingly somnolent this AM, follows commands intermittently, and repeat CTH unchanged from prior  > Will ctm, NSGY aware  - goal -160     #Peripheral neuropathy  - c/w gabapentin    ====================  CARDIOVASCULAR    #CAD s/p PCI  #STEMI  - TTE 7/22 with normal LV internal dimensions with discrete upper septal hypertrophy. EF 55%, inferolateral wall is akinetic.  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  - no indication for pulmonary medications or antibiotics  - Pulm following, appreciate recs    - satting well on RA  - incentive mario    ====================  GI    - CC Diet  - pantoprazole 40 qD  - senna, dulcolax    ====================  ENDOCRINE    #DM  - c/w Lantus 15 qhs, Admelog 7 before breakfast and dinner, Admelog 5 before lunch  - moderate dose correctional scale  - Endo following, appreciate recs     ====================  RENAL    - Retaining - straight cath prn  - Monitor UOP, lytes    ====================  HEMATOLOGY/ONCOLOGY    #Leukocytosis  - iso dexamethasone, no s/s infection  - ctm    #DVT PPx  - c/w ASA, heparin gtt    ====================  INFECTIOUS DISEASES    - No active issues    ====================    Betty Reed MD  Internal Medicine Resident. ====================  Assessment & Plan:   Patient is an 82 year old female that presented to ED with abnormal MRI reading (discovered by patient's orthopedic), also with gait/balance issues since April, found to have R temporal brain mass c/f metastatic brain lesion now s/p craniotomy 7/27. While pending surgery, AC held, and on 8/1 presented with CP found to have STEMI on EKG. ASA loaded and started on hep gtt 8/1 and transferred to CCU for further management.    ====================  NEUROLOGY    #AMS  - patient increasingly somnolent 8/2 AM, follows commands intermittently  - responds briefly to sternal rub and painful stimuli   - Repeat CTH x5 unchanged from prior  - UA+, started on CTX  > Will ctm, NSGY aware    #Brain lesion  - s/p craniotomy for resection of brain mass 7/27, path showing NSC carcinoma (favor adeno)  - c/w depakote  - c/w dexamethasone  - repeat CTH 1 hour post AC resumption unremarkable  - goal -160     #Peripheral neuropathy  - c/w gabapentin    ====================  CARDIOVASCULAR    #CAD s/p PCI  #STEMI  - TTE 7/22 with normal LV internal dimensions with discrete upper septal hypertrophy. EF 55%, inferolateral wall is akinetic.  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  - no indication for pulmonary medications or antibiotics  - Pulm following, appreciate recs    - Pt tachypneic, borderline HoTN 8/2 c/f PE, CTA chest obtained to r/o PE  - F/u CTA chest  - satting well on RA  - incentive mario    ====================  GI    - CC Diet  - pantoprazole 40 qD  - senna, dulcolax    ====================  ENDOCRINE    #DM  - c/w Lantus 15 qhs, Admelog 7 before breakfast and dinner, Admelog 5 before lunch  - moderate dose correctional scale  - Endo following, appreciate recs     ====================  RENAL    - Retaining - straight cath prn  - Monitor UOP, lytes    ====================  HEMATOLOGY/ONCOLOGY    #Leukocytosis  - iso dexamethasone, however now with +UA    #Metastatic lung cancer  - Onc following, recs appreciated  - Path +NSCC    #DVT PPx  - c/w ASA, heparin gtt    ====================  INFECTIOUS DISEASES    - UA +LE  - started on CTX    ====================    Betty Reed MD  Internal Medicine Resident.

## 2022-08-03 NOTE — PROGRESS NOTE ADULT - SUBJECTIVE AND OBJECTIVE BOX
SUMMARY:  83 y/o female (former smoker) with PMHx of HTN, CAD, HLD, MI, peripheral neuropathy, DM and peripheral artery disease presented to the ED for imaging for her head due to abnormal spine MRI by her orthopedist (Dr. Castro) during workup for her spine for her chronic back problems. The orthopedist noted something in her brain. Otherwise pt has no symptoms, stated that since 04/2022 she has had gait/balance issues. Endorsed that if she sits for a while and gets up, she feels like she is "drunk" and feels off. Denied chest pain, SOB, fevers, chills, numbness/paresthesias, muscular weakness, dizziness, abdominal pain, headaches. MR head significant for R temporal lobe lesion with edema, midline shift and uncal herniation concerning for metastatic disease. 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     HOSPITAL COURSE: R crani for metastatic lesion 7/27 consistent w metastatic non small cell lung cancer, residual lesion on L side of brain. Course complicated on the hospital floors by inferolateral STEMI 8/1, pt transferred to CICU and started on hep gtt no bolus and ASA, no cath given high risk from comorbidities and recent surgery.     24 HOUR EVENTS: Decreasing mental status this AM, back to baseline after straight cath, UA+, started on zosyn, cultures sent.       REVIEW OF SYSTEMS: Denies any symptoms    ALLERGIES: Allergies    adhesives (Pruritus)  No Known Drug Allergies    Intolerances        VITALS/DATA/ORDERS: [x] Reviewed    DEVICES:   [] Restraints [x] PIVs [] ET tube [] central line [] PICC [] arterial line [] bob [] NGT/OGT [] EVD [] LD [] TRICIA/HMV [] LiCOX [] ICP monitor [] Trach [] PEG [] Chest Tube [] other:    EXAMINATION:  General: No acute distress, conversational  HEENT: Anicteric sclerae  Cardiac: [id number]  Lungs: Clear  Abdomen: Soft, non-tender, +BS  Extremities: No c/c/e  Skin/Incision Site: Clean, dry and intact  Neurologic: Awake, alert, fully oriented, follows commands, PERRL, EOMI, ?R nasolabial flattening, tongue midline, no drift, full strength

## 2022-08-03 NOTE — CHART NOTE - NSCHARTNOTEFT_GEN_A_CORE
Called to bedside because patient had another episode of decreased responsiveness.  Exam: EOV, pupils 3R, no gaze preference. But not answering orientation questions, no FC, says "ouch" to noxious stimulus. Wds all extremities AG to nox.  -CTH stat  -Hook patient to vEEG due to concern for seizure activity Called to bedside because patient had another episode of decreased responsiveness.  Exam: EOV, pupils 3R, no gaze preference. But not answering orientation questions, no FC, says "ouch" to noxious stimulus. Wds all extremities AG to nox.  -CTH appears stable, no new acute ICH  -vEEG to r/o seizure activity

## 2022-08-03 NOTE — PROGRESS NOTE ADULT - ASSESSMENT
ASSESSMENT:    82 year old gentlewoman, former smoker, without history of intrinsic lung disease. She has a history of HTN, HLD, DM, CAD s/p PCI (1990s and 2021) and PAD s/p RLE stenting. The patient has been having difficulty ambulating over the last several months due to a sense of imbalance with impaired gait. Her family has noted a change in her personality with pressured speech and insomnia. Outpatient cervical MRI incidentally note a CNS lesion. Lumbar spine MRI performed due to chronic back pain revealed multilevel foraminal stenosis/disc bulges. 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 The patient has no shortness of breath or hypoxemia on room air. She has no cough, sputum production, chest congestion or wheeze. No fevers, chills or sweats. No chest pain/pressure or palpitations.     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 presenting with several months of ataxia - frontal lobe lesions have lead to decreased inhibition as well as to pressured and loquacious speech    8/2 - chest pain last night -> EKG c/w STEMI - cardiac enzymes are elevated -> catheterization not performed due to recent neurosurgery -> started on ASA and heparin gtt -> now quite lethargic although awakes to state her name and the name of her children;       PLAN/RECOMMENDATIONS:    stable oxygenation on room air  start zosyn for possible UTI and post-obstructive pneumonia  CTA ordered by CCU team - patient already on heparin  spirometry    FEV1 - 1.81 liters - 88% predicted    FVC - 2.48 liters - 90% predicted    FEV1% - 73       c/w normal spirometry  no indication for pulmonary medications  s/p bronchoscopy -> 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 - foundation studies have been ordered  cardiology evaluation noted     STEMI - not a candidate for percutaneous coronary intervention given recent craniotomy limiting anti-platelet agents    medical management including ASA/heparin gtt/lipitor/losartan/metoprolol  neurosurgery evaluation noted    s/p right craniotomy for tumor resection    repeat CT scan 8/2  -> right temporal craniotomy - minimally decreased right frontal pneumocephalus - similar subdural hemorrhage along the right lateral convexity measuring up to 7 to 8 mm in greatest depth anteriorly - extensive edema surrounding the right temporal surgical bed - leftward midline shift of 6 to 7 mm - partial effacement of the suprasellar and right perimesencephalic cisterns - mild asymmetric dilatation of the left lateral ventricle - low density lesion with mild surrounding edema in the left parasagittal frontal lobe.    prophylactic anticonvulsants -> brivaracetam    decadron 2mg q12h po for cerebral edema -> taper continues (?)    incentive spirometry as able    NPO until mental status improves    GI/DVT prophylaxis - protonix/heparin gtt  endocrinology evaluation noted    insulin regimen being adjusted for steroid induced hyperglycemia  radiation oncology, neurooncology, medical oncology evaluations   bowel regimen    Will follow with you. Plan of care discussed with the patient's daughter-in-law by phone, with the neurosurgery team and with the CCU team.    Satish Hopkins MD, [facility]  [phone]  Pulmonary Medicine     ASSESSMENT:    82 year old gentlewoman, former smoker, without history of intrinsic lung disease. She has a history of HTN, HLD, DM, CAD s/p PCI (1990s and 2021) and PAD s/p RLE stenting. The patient has been having difficulty ambulating over the last several months due to a sense of imbalance with impaired gait. Her family has noted a change in her personality with pressured speech and insomnia. Outpatient cervical MRI incidentally note a CNS lesion. Lumbar spine MRI performed due to chronic back pain revealed multilevel foraminal stenosis/disc bulges. 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 The patient has no shortness of breath or hypoxemia on room air. She has no cough, sputum production, chest congestion or wheeze. No fevers, chills or sweats. No chest pain/pressure or palpitations.     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 presenting with several months of ataxia - frontal lobe lesions have lead to decreased inhibition as well as to pressured and loquacious speech    8/2 - chest pain last night -> EKG c/w STEMI - cardiac enzymes are elevated -> catheterization not performed due to recent neurosurgery -> started on ASA and heparin gtt -> now quite lethargic although awakes to state her name and the name of her children;       PLAN/RECOMMENDATIONS:    stable oxygenation on room air  start zosyn for possible UTI and post-obstructive pneumonia  CTA -> pulmonary embolism in the lateral segmental branch of the right middle lobe without evidence of right heart strain - multiple segmental branches in the left lung are obscured by motion - 1.2 cm left hilar lymph node - patent central airways - left upper lobe mass decreased in size - obstruction of the left apical posterior bronchus with associated segmental atelectasis along the mediastinum  spirometry    FEV1 - 1.81 liters - 88% predicted    FVC - 2.48 liters - 90% predicted    FEV1% - 73       c/w normal spirometry  no indication for pulmonary medications  patient already on heparin gtt for ACS which will treat the pulmonary embolism  s/p bronchoscopy -> 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 - foundation studies have been ordered  cardiology evaluation noted     STEMI - not a candidate for percutaneous coronary intervention given recent craniotomy limiting anti-platelet agents    medical management including ASA/heparin gtt/lipitor/losartan/metoprolol  neurosurgery evaluation noted    s/p right craniotomy for tumor resection    repeat CT scan 8/2  -> right temporal craniotomy - minimally decreased right frontal pneumocephalus - similar subdural hemorrhage along the right lateral convexity measuring up to 7 to 8 mm in greatest depth anteriorly - extensive edema surrounding the right temporal surgical bed - leftward midline shift of 6 to 7 mm - partial effacement of the suprasellar and right perimesencephalic cisterns - mild asymmetric dilatation of the left lateral ventricle - low density lesion with mild surrounding edema in the left parasagittal frontal lobe.    prophylactic anticonvulsants -> brivaracetam    decadron 2mg q12h po for cerebral edema -> taper continues (?)    incentive spirometry as able    NPO until mental status improves    GI/DVT prophylaxis - protonix/heparin gtt  endocrinology evaluation noted    insulin regimen being adjusted for steroid induced hyperglycemia  radiation oncology, neurooncology, medical oncology evaluations   bowel regimen    Will follow with you. Plan of care discussed with the patient's daughter-in-law by phone, with the neurosurgery team and with the CCU team.    Satish Hopkins MD, [facility]  [phone]  Pulmonary Medicine

## 2022-08-03 NOTE — PROGRESS NOTE ADULT - SUBJECTIVE AND OBJECTIVE BOX
CARDIOLOGY FOLLOW UP - Dr. Alcaraz  DATE OF SERVICE: 8/3/22     CC events noted , change in mental status   eeg in progress       REVIEW OF SYSTEMS:  DOUGLAS     PHYSICAL EXAM:  T(C): 36.7 (08-03-22 @ 06:00), Max: 36.7 (08-03-22 @ 06:00)  HR: 73 (08-03-22 @ 10:00) (63 - 83)  BP: 118/58 (08-03-22 @ 10:00) (94/53 - 156/97)  RR: 23 (08-03-22 @ 10:00) (16 - 32)  SpO2: 99% (08-03-22 @ 10:00) (93% - 99%)  Wt(kg): --  I&O's Summary    02 Aug 2022 07:01  -  03 Aug 2022 07:00  --------------------------------------------------------  IN: 744 mL / OUT: 1100 mL / NET: -356 mL    03 Aug 2022 07:01  -  03 Aug 2022 12:04  --------------------------------------------------------  IN: 21 mL / OUT: 900 mL / NET: -879 mL        Appearance: lethargic 	  Cardiovascular: Normal S1 S2,RR  Respiratory: Lungs clear to auscultation	  Gastrointestinal:  Soft, Non-tender, + BS	  Extremities: Normal range of motion, No clubbing, cyanosis or edema      Home Medications:  acetaminophen 325 mg oral tablet: 2 tab(s) orally every 6 hours, As needed, Temp greater or equal to 38C (100.4F), Mild Pain (1 - 3) (01 Aug 2022 13:52)  amLODIPine 5 mg oral tablet: 1 tab(s) orally once a day (01 Aug 2022 13:52)  atorvastatin 40 mg oral tablet: 1 tab(s) orally once a day (at bedtime) (01 Aug 2022 13:52)  bisacodyl 5 mg oral delayed release tablet: 1 tab(s) orally once a day, As needed, Constipation (01 Aug 2022 13:52)  dexamethasone 2 mg oral tablet: 1 tab po every 8 hours for 2 days, then 1 tab po q12 hours x2 days, then 1 tab po daily x3 days, then stop (01 Aug 2022 13:52)  divalproex sodium 500 mg oral delayed release tablet: 1 tab(s) orally every 8 hours (01 Aug 2022 13:52)  enoxaparin: 40 milligram(s) subcutaneous once a day (at bedtime) (01 Aug 2022 13:52)  gabapentin 100 mg oral capsule: 2 cap(s) orally once a day (at bedtime) (01 Aug 2022 13:52)  insulin glargine 100 units/mL subcutaneous solution: 15 unit(s) subcutaneous once a day (at bedtime) (01 Aug 2022 13:52)  insulin lispro 100 units/mL injectable solution: 5 unit(s) subcutaneous once a day before lunch (01 Aug 2022 13:52)  insulin lispro 100 units/mL injectable solution: 7 unit(s) subcutaneous once a day before breakfast (01 Aug 2022 13:52)  insulin lispro 100 units/mL injectable solution: 7 unit(s) subcutaneous once a day before dinner (01 Aug 2022 13:52)  insulin lispro 100 units/mL injectable solution: moderate dose sliding scale as per hospital/facility protocol, 3 times a day (before meals) (01 Aug 2022 13:52)  losartan 100 mg oral tablet: 1 tab(s) orally once a day (01 Aug 2022 13:52)  melatonin 5 mg oral tablet: 1 tab(s) orally once a day (at bedtime), As needed, Sleep (01 Aug 2022 13:52)  metoprolol tartrate 25 mg oral tablet: 1 tab(s) orally 2 times a day (01 Aug 2022 13:52)  oxyCODONE 10 mg oral tablet: 1 tab(s) orally every 4 hours, As needed, Severe Pain (7 - 10) (01 Aug 2022 13:52)  oxyCODONE 5 mg oral tablet: 1 tab(s) orally every 4 hours, As needed, Moderate Pain (4 - 6) (01 Aug 2022 13:52)  pantoprazole 40 mg oral delayed release tablet: 1 tab(s) orally once a day (before a meal) (01 Aug 2022 13:52)  polyethylene glycol 3350 oral powder for reconstitution: 17 gram(s) orally 2 times a day (01 Aug 2022 13:52)  senna leaf extract oral tablet: 2 tab(s) orally once a day (at bedtime) (01 Aug 2022 13:52)      MEDICATIONS  (STANDING):  aspirin enteric coated 81 milliGRAM(s) Oral daily  atorvastatin 80 milliGRAM(s) Oral at bedtime  BACItracin   Ointment 1 Application(s) Topical two times a day  brivaracetam  IVPB 100 milliGRAM(s) IV Intermittent two times a day  dexAMETHasone  Injectable 2 milliGRAM(s) IV Push every 12 hours  gabapentin 200 milliGRAM(s) Oral at bedtime  heparin  Infusion 1050 Unit(s)/Hr (7 mL/Hr) IV Continuous <Continuous>  insulin glargine Injectable (LANTUS) 20 Unit(s) SubCutaneous at bedtime  insulin lispro (ADMELOG) corrective regimen sliding scale   SubCutaneous Before meals and at bedtime  insulin lispro Injectable (ADMELOG) 5 Unit(s) SubCutaneous before lunch  insulin lispro Injectable (ADMELOG) 7 Unit(s) SubCutaneous before breakfast  insulin lispro Injectable (ADMELOG) 7 Unit(s) SubCutaneous before dinner  losartan 100 milliGRAM(s) Oral daily  metoprolol tartrate 12.5 milliGRAM(s) Oral two times a day  pantoprazole    Tablet 40 milliGRAM(s) Oral before breakfast  piperacillin/tazobactam IVPB. 3.375 Gram(s) IV Intermittent once  piperacillin/tazobactam IVPB.. 3.375 Gram(s) IV Intermittent every 8 hours  polyethylene glycol 3350 17 Gram(s) Oral two times a day  senna 2 Tablet(s) Oral at bedtime      TELEMETRY: nsr    ECG:  	  RADIOLOGY:    < from: CT Head No Cont (08.03.22 @ 04:38) >    IMPRESSION:    Redemonstration of right temporal craniotomy for prior resection of a   lesion. There is a large degree of hypoattenuation throughout the right   temporal lobe and extending into the right basal ganglia and right   parietal lobe, suggestive of a combination of vasogenic edema and/or   encephalomalacia/gliosis. This results in secondary mass effect upon the   parenchyma of the right cerebral hemisphere, not significantly changed.   There is mild mass effect on the ventricular system, right greater than   left. There is secondary right to left midline shift of approximately 5.7   mm. Expected postsurgical extra-axial pneumocephalus is reidentified   without significant change. Tiny expected subdural hematoma overlying the   right anterior superior frontal lobe is reidentified without change.    Reidentified is a hypoattenuating lesion within the left medial frontal   lobe measuring 1.4 x 1.6 cm, compatible with known additional metastasis.   No significant surrounding edema.    < end of copied text >    DIAGNOSTIC TESTING:  [ ] Echocardiogram:  [ ]  Catheterization:  [ ] Stress Test:    OTHER: 	    LABS:	 	    Creatine Kinase, Serum: 326 U/L [25 - 170] (08-03 @ 08:57)  CKMB Units: 29.3 ng/mL [0.0 - 3.8] (08-03 @ 08:57)  Troponin T, High Sensitivity Result: 1121 ng/L [0 - 51] (08-03 @ 08:57)  Creatine Kinase, Serum: 355 U/L [25 - 170] (08-03 @ 07:45)  CKMB Units: 32.0 ng/mL [0.0 - 3.8] (08-03 @ 07:45)  Troponin T, High Sensitivity Result: 1120 ng/L [0 - 51] (08-03 @ 07:45)  CKMB Units: 63.2 ng/mL [0.0 - 3.8] (08-02 @ 17:12)  Creatine Kinase, Serum: 466 U/L [25 - 170] (08-02 @ 17:12)  Troponin T, High Sensitivity Result: 948 ng/L [0 - 51] (08-02 @ 17:12)  Creatine Kinase, Serum: 470 U/L [25 - 170] (08-02 @ 11:09)  CKMB Units: 72.7 ng/mL [0.0 - 3.8] (08-02 @ 11:09)  Troponin T, High Sensitivity Result: 961 ng/L [0 - 51] (08-02 @ 11:09)  CKMB Units: 57.1 ng/mL [0.0 - 3.8] (08-02 @ 04:07)  Creatine Kinase, Serum: 386 U/L [25 - 170] (08-02 @ 04:07)  Troponin T, High Sensitivity Result: 622 ng/L [0 - 51] (08-02 @ 04:07)  CKMB Units: 19.7 ng/mL [0.0 - 3.8] (08-01 @ 20:29)  Creatine Kinase, Serum: 171 U/L [25 - 170] (08-01 @ 20:29)  Troponin T, High Sensitivity Result: 285 ng/L [0 - 51] (08-01 @ 20:29)  Troponin T, High Sensitivity Result: 22 ng/L [0 - 51] (08-01 @ 15:26)  CKMB Units: 2.0 ng/mL [0.0 - 3.8] (08-01 @ 15:26)  Creatine Kinase, Serum: 78 U/L [25 - 170] (08-01 @ 15:26)                          13.2   18.86 )-----------( 230      ( 03 Aug 2022 04:07 )             40.4     08-03    140  |  102  |  56<H>  ----------------------------<  169<H>  4.5   |  25  |  1.28    Ca    8.9      03 Aug 2022 04:07  Phos  2.7     08-03  Mg     2.2     08-03    TPro  5.5<L>  /  Alb  2.8<L>  /  TBili  0.2  /  DBili  x   /  AST  57<H>  /  ALT  16  /  AlkPhos  66  08-03    PT/INR - ( 03 Aug 2022 04:07 )   PT: 11.8 sec;   INR: 1.02 ratio         PTT - ( 03 Aug 2022 04:07 )  PTT:93.5 sec

## 2022-08-03 NOTE — PROGRESS NOTE ADULT - ATTENDING COMMENTS
Admitted with brain mass s/p resection  On 8/1 was having chest pain and was found to have an inferior STEMI  Not a PCI candidate due to recent brain surgery  Transferred to the CICU for medical management  She is somnolent, rousable to painful stimuli only  She has had multiple head CTs which have not shown new pathology  Continue Depakote, Decadron; start EEG per Neuro  Continue ASA, holding P2Y12 inhibitor due to bleeding risk  Borderline hypotension, chest pain free - continue Lopressor and Losartan  Target -160 per Neurosurgery  TTE with preserved biventricular systolic function  Cardiac enzymes have peaked  O2 sats mid 90s on room air (former smoker)  Normal renal function, compensated on exam  H/H acceptable on Heparin drip for ACS and segmental PE  +UA - start Ceftriaxone   Sugars controlled on Lantus  No central access

## 2022-08-03 NOTE — CHART NOTE - NSCHARTNOTEFT_GEN_A_CORE
Wound Care Team Note:    Request for wound care consult for forehead wound related to neurosurgery procedure. Neurosurgery managing. Discussed with clinical nurse. Will not follow.    Paige Epstein NP-C, CWOCN [id number]

## 2022-08-03 NOTE — PROVIDER CONTACT NOTE (CHANGE IN STATUS NOTIFICATION) - BACKGROUND
Pt. admitted for right temporal brain mass s/p craniotomy 07/27 during admission and metastic disease to lungs and brain. Pt. experienced lethargy and difficulty moving extremities 08/02 during the day, urgent CTH performed x3 resulting no changes. After straight cath, pt. resumed baseline neuro status.

## 2022-08-03 NOTE — PROGRESS NOTE ADULT - ASSESSMENT
Patient is an 82 year old female that presented to ED with abnormal MRI reading (discovered by patient's orthopedic), also with gait/balance issues since April, found to have R temporal brain mass c/f metastatic brain lesion now s/p craniotomy 7/27. While pending surgery, AC held, and on 8/1 presented with CP found to have STEMI on EKG. ASA loaded and started on hep gtt 8/1 and transferred to CCU for further management.    ====================  NEUROLOGY    #AMS  - patient increasingly somnolent 8/2 AM, follows commands intermittently  - responds briefly to sternal rub and painful stimuli   - Repeat CTH x5 unchanged from prior  - UA+, started on CTX  > Will ctm, NSGY aware    #Brain lesion  - s/p craniotomy for resection of brain mass 7/27, path showing NSC carcinoma (favor adeno)  - c/w depakote  - c/w dexamethasone  - repeat CTH 1 hour post AC resumption unremarkable  - goal -160     #Peripheral neuropathy  - c/w gabapentin    ====================  CARDIOVASCULAR    #CAD s/p PCI  #STEMI  - TTE 7/22 with normal LV internal dimensions with discrete upper septal hypertrophy. EF 55%, inferolateral wall is akinetic.  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  - no indication for pulmonary medications or antibiotics  - Pulm following, appreciate recs    - Pt tachypneic, borderline HoTN 8/2 c/f PE, CTA chest obtained to r/o PE  - F/u CTA chest  - satting well on RA  - incentive mario    ====================  GI    - CC Diet  - pantoprazole 40 qD  - senna, dulcolax    ====================  ENDOCRINE    #DM  - c/w Lantus 15 qhs, Admelog 7 before breakfast and dinner, Admelog 5 before lunch  - moderate dose correctional scale  - Endo following, appreciate recs     ====================  RENAL    - Retaining - straight cath prn  - Monitor UOP, lytes    ====================  HEMATOLOGY/ONCOLOGY    #Leukocytosis  - iso dexamethasone, however now with +UA    #Metastatic lung cancer  - Onc following, recs appreciated  - Path +NSCC    #DVT PPx  - c/w ASA, heparin gtt    ====================  INFECTIOUS DISEASES    - UA +LE  - started on CTX

## 2022-08-03 NOTE — PROGRESS NOTE ADULT - SUBJECTIVE AND OBJECTIVE BOX
Betty Reed , PGY-2    PATIENT: LANIE BERTRAND, MRN: [id number]    CHIEF COMPLAINT: Patient is a 82y old  Female who presents with a chief complaint of abnormal imaging (03 Aug 2022 05:38)      INTERVAL HISTORY/OVERNIGHT EVENTS: No overnight events. At bedside, patient has been sleeping and eating well. Denies N/V/D/C. Last BM x1 regular yesterday. Denies abdominal pain. Denies chest pain or SOB, cough. Has been ambulating with assistance. Oriented to person, place, and time. Breathing comfortably on room air.      MEDICATIONS:  MEDICATIONS  (STANDING):  aspirin enteric coated 81 milliGRAM(s) Oral daily  atorvastatin 80 milliGRAM(s) Oral at bedtime  BACItracin   Ointment 1 Application(s) Topical two times a day  brivaracetam  IVPB 100 milliGRAM(s) IV Intermittent two times a day  cefTRIAXone   IVPB 1000 milliGRAM(s) IV Intermittent every 24 hours  dexAMETHasone  Injectable 2 milliGRAM(s) IV Push every 12 hours  gabapentin 200 milliGRAM(s) Oral at bedtime  heparin  Infusion 1050 Unit(s)/Hr (7 mL/Hr) IV Continuous <Continuous>  insulin glargine Injectable (LANTUS) 20 Unit(s) SubCutaneous at bedtime  insulin lispro (ADMELOG) corrective regimen sliding scale   SubCutaneous Before meals and at bedtime  insulin lispro Injectable (ADMELOG) 5 Unit(s) SubCutaneous before lunch  insulin lispro Injectable (ADMELOG) 7 Unit(s) SubCutaneous before breakfast  insulin lispro Injectable (ADMELOG) 7 Unit(s) SubCutaneous before dinner  losartan 100 milliGRAM(s) Oral daily  metoprolol tartrate 12.5 milliGRAM(s) Oral two times a day  pantoprazole    Tablet 40 milliGRAM(s) Oral before breakfast  polyethylene glycol 3350 17 Gram(s) Oral two times a day  senna 2 Tablet(s) Oral at bedtime    MEDICATIONS  (PRN):  bisacodyl 5 milliGRAM(s) Oral daily PRN Constipation  melatonin 5 milliGRAM(s) Oral at bedtime PRN Sleep      ALLERGIES: Allergies    adhesives (Pruritus)  No Known Drug Allergies    Intolerances        OBJECTIVE:  ICU Vital Signs Last 24 Hrs  T(C): 36.7 (03 Aug 2022 06:00), Max: 36.7 (03 Aug 2022 06:00)  T(F): 98.1 (03 Aug 2022 06:00), Max: 98.1 (03 Aug 2022 06:00)  HR: 80 (03 Aug 2022 08:00) (59 - 83)  BP: 128/58 (03 Aug 2022 08:00) (77/48 - 156/97)  BP(mean): 83 (03 Aug 2022 08:00) (58 - 121)  ABP: --  ABP(mean): --  RR: 24 (03 Aug 2022 08:00) (16 - 32)  SpO2: 94% (03 Aug 2022 08:00) (93% - 98%)    O2 Parameters below as of 03 Aug 2022 08:00  Patient On (Oxygen Delivery Method): room air            Adult Advanced Hemodynamics Last 24 Hrs  CVP(mm Hg): --  CVP(cm H2O): --  CO: --  CI: --  PA: --  PA(mean): --  PCWP: --  SVR: --  SVRI: --  PVR: --  PVRI: --  CAPILLARY BLOOD GLUCOSE      POCT Blood Glucose.: 139 mg/dL (03 Aug 2022 04:45)  POCT Blood Glucose.: 318 mg/dL (02 Aug 2022 22:27)  POCT Blood Glucose.: 255 mg/dL (02 Aug 2022 17:55)  POCT Blood Glucose.: 130 mg/dL (02 Aug 2022 13:20)  POCT Blood Glucose.: 152 mg/dL (02 Aug 2022 11:17)    CAPILLARY BLOOD GLUCOSE      POCT Blood Glucose.: 139 mg/dL (03 Aug 2022 04:45)    I&O's Summary    02 Aug 2022 07:01  -  03 Aug 2022 07:00  --------------------------------------------------------  IN: 744 mL / OUT: 1100 mL / NET: -356 mL      Daily     Daily     PHYSICAL EXAMINATION:  General: Comfortable, no acute distress, cooperative with exam.  HEENT: PERRLA, EOMI, moist mucous membranes.  Respiratory: CTAB, normal respiratory effort, no coughing, wheezes, crackles, or rales.  CV: RRR, S1/S2, no murmurs, rubs or gallops. No JVD. Distal pulses intact.  Abdominal: Soft, nontender, nondistended, no rebound or guarding, normal bowel sounds.  Neurology: AOx3, no focal neuro defects, DE LA GARZA x 4.  Extremities: No pitting edema, + Peripheral pulses.  Incisions:   Tubes:    LABS:  ABG - ( 03 Aug 2022 08:23 )  pH, Arterial: 7.56  pH, Blood: x     /  pCO2: 33    /  pO2: 84    / HCO3: 30    / Base Excess: 7.3   /  SaO2: 97.0                                    13.2   18.86 )-----------( 230      ( 03 Aug 2022 04:07 )             40.4     08-    140  |  102  |  56<H>  ----------------------------<  169<H>  4.5   |  25  |  1.28    Ca    8.9      03 Aug 2022 04:07  Phos  2.7     08-  Mg     2.2     08-    TPro  5.5<L>  /  Alb  2.8<L>  /  TBili  0.2  /  DBili  x   /  AST  57<H>  /  ALT  16  /  AlkPhos  66  08-03    LIVER FUNCTIONS - ( 03 Aug 2022 04:07 )  Alb: 2.8 g/dL / Pro: 5.5 g/dL / ALK PHOS: 66 U/L / ALT: 16 U/L / AST: 57 U/L / GGT: x           PT/INR - ( 03 Aug 2022 04:07 )   PT: 11.8 sec;   INR: 1.02 ratio         PTT - ( 03 Aug 2022 04:07 )  PTT:93.5 sec  Creatine Kinase, Serum: 355 U/L ( @ 07:45)  CKMB Units: 32.0 ng/mL ( @ 07:45)  CKMB Units: 63.2 ng/mL ( @ 17:12)  Creatine Kinase, Serum: 466 U/L ( @ 17:12)  Creatine Kinase, Serum: 470 U/L ( @ 11:09)  CKMB Units: 72.7 ng/mL ( @ 11:09)    CARDIAC MARKERS ( 03 Aug 2022 07:45 )  x     / x     / 355 U/L / x     / 32.0 ng/mL  CARDIAC MARKERS ( 02 Aug 2022 17:12 )  x     / x     / 466 U/L / x     / 63.2 ng/mL  CARDIAC MARKERS ( 02 Aug 2022 11:09 )  x     / x     / 470 U/L / x     / 72.7 ng/mL  CARDIAC MARKERS ( 02 Aug 2022 04:07 )  x     / x     / 386 U/L / x     / 57.1 ng/mL  CARDIAC MARKERS ( 01 Aug 2022 20:29 )  x     / x     / 171 U/L / x     / 19.7 ng/mL  CARDIAC MARKERS ( 01 Aug 2022 15:26 )  x     / x     / 78 U/L / x     / 2.0 ng/mL      Urinalysis Basic - ( 03 Aug 2022 08:40 )    Color: Light Yellow / Appearance: Clear / S.015 / pH: x  Gluc: x / Ketone: Trace  / Bili: Negative / Urobili: Negative   Blood: x / Protein: Negative / Nitrite: Negative   Leuk Esterase: Large / RBC: 1 /hpf / WBC 51 /HPF   Sq Epi: x / Non Sq Epi: 0 /hpf / Bacteria: Many        TELEMETRY:     EKG:     IMAGING:  Betty Reed , PGY-2    PATIENT: LANIE BERTRAND, MRN: [id number]    CHIEF COMPLAINT: Patient is a 82y old  Female who presents with a chief complaint of abnormal imaging (03 Aug 2022 05:38)      INTERVAL HISTORY/OVERNIGHT EVENTS: No overnight events. At bedside, patient has been sleeping and eating well. Denies N/V/D/C. Last BM x1 regular yesterday. Denies abdominal pain. Denies chest pain or SOB, cough. Has been ambulating with assistance. Oriented to person, place, and time. Breathing comfortably on room air.      MEDICATIONS:  MEDICATIONS  (STANDING):  aspirin enteric coated 81 milliGRAM(s) Oral daily  atorvastatin 80 milliGRAM(s) Oral at bedtime  BACItracin   Ointment 1 Application(s) Topical two times a day  brivaracetam  IVPB 100 milliGRAM(s) IV Intermittent two times a day  cefTRIAXone   IVPB 1000 milliGRAM(s) IV Intermittent every 24 hours  dexAMETHasone  Injectable 2 milliGRAM(s) IV Push every 12 hours  gabapentin 200 milliGRAM(s) Oral at bedtime  heparin  Infusion 1050 Unit(s)/Hr (7 mL/Hr) IV Continuous <Continuous>  insulin glargine Injectable (LANTUS) 20 Unit(s) SubCutaneous at bedtime  insulin lispro (ADMELOG) corrective regimen sliding scale   SubCutaneous Before meals and at bedtime  insulin lispro Injectable (ADMELOG) 5 Unit(s) SubCutaneous before lunch  insulin lispro Injectable (ADMELOG) 7 Unit(s) SubCutaneous before breakfast  insulin lispro Injectable (ADMELOG) 7 Unit(s) SubCutaneous before dinner  losartan 100 milliGRAM(s) Oral daily  metoprolol tartrate 12.5 milliGRAM(s) Oral two times a day  pantoprazole    Tablet 40 milliGRAM(s) Oral before breakfast  polyethylene glycol 3350 17 Gram(s) Oral two times a day  senna 2 Tablet(s) Oral at bedtime    MEDICATIONS  (PRN):  bisacodyl 5 milliGRAM(s) Oral daily PRN Constipation  melatonin 5 milliGRAM(s) Oral at bedtime PRN Sleep      ALLERGIES: Allergies    adhesives (Pruritus)  No Known Drug Allergies    Intolerances        OBJECTIVE:  ICU Vital Signs Last 24 Hrs  T(C): 36.7 (03 Aug 2022 06:00), Max: 36.7 (03 Aug 2022 06:00)  T(F): 98.1 (03 Aug 2022 06:00), Max: 98.1 (03 Aug 2022 06:00)  HR: 80 (03 Aug 2022 08:00) (59 - 83)  BP: 128/58 (03 Aug 2022 08:00) (77/48 - 156/97)  BP(mean): 83 (03 Aug 2022 08:00) (58 - 121)  ABP: --  ABP(mean): --  RR: 24 (03 Aug 2022 08:00) (16 - 32)  SpO2: 94% (03 Aug 2022 08:00) (93% - 98%)    O2 Parameters below as of 03 Aug 2022 08:00  Patient On (Oxygen Delivery Method): room air            Adult Advanced Hemodynamics Last 24 Hrs  CVP(mm Hg): --  CVP(cm H2O): --  CO: --  CI: --  PA: --  PA(mean): --  PCWP: --  SVR: --  SVRI: --  PVR: --  PVRI: --  CAPILLARY BLOOD GLUCOSE      POCT Blood Glucose.: 139 mg/dL (03 Aug 2022 04:45)  POCT Blood Glucose.: 318 mg/dL (02 Aug 2022 22:27)  POCT Blood Glucose.: 255 mg/dL (02 Aug 2022 17:55)  POCT Blood Glucose.: 130 mg/dL (02 Aug 2022 13:20)  POCT Blood Glucose.: 152 mg/dL (02 Aug 2022 11:17)    CAPILLARY BLOOD GLUCOSE      POCT Blood Glucose.: 139 mg/dL (03 Aug 2022 04:45)    I&O's Summary    02 Aug 2022 07:01  -  03 Aug 2022 07:00  --------------------------------------------------------  IN: 744 mL / OUT: 1100 mL / NET: -356 mL      Daily     Daily     PHYSICAL EXAMINATION:  General: Comfortable, no acute distress, cooperative with exam.  HEENT: PERRLA, EOMI, moist mucous membranes.  Respiratory: CTAB, normal respiratory effort, no coughing, wheezes, crackles, or rales.  CV: RRR, S1/S2, no murmurs, rubs or gallops. No JVD. Distal pulses intact.  Abdominal: Soft, nontender, nondistended, no rebound or guarding, normal bowel sounds.  Neurology: Responds to painful stimuli, DE LA GARZA x 4.  Extremities: No pitting edema, 2+ Peripheral pulses.  Incisions:   Tubes:    LABS:  ABG - ( 03 Aug 2022 08:23 )  pH, Arterial: 7.56  pH, Blood: x     /  pCO2: 33    /  pO2: 84    / HCO3: 30    / Base Excess: 7.3   /  SaO2: 97.0                                    13.2   18.86 )-----------( 230      ( 03 Aug 2022 04:07 )             40.4     08-03    140  |  102  |  56<H>  ----------------------------<  169<H>  4.5   |  25  |  1.28    Ca    8.9      03 Aug 2022 04:07  Phos  2.7     08-  Mg     2.2     08-    TPro  5.5<L>  /  Alb  2.8<L>  /  TBili  0.2  /  DBili  x   /  AST  57<H>  /  ALT  16  /  AlkPhos  66  08-03    LIVER FUNCTIONS - ( 03 Aug 2022 04:07 )  Alb: 2.8 g/dL / Pro: 5.5 g/dL / ALK PHOS: 66 U/L / ALT: 16 U/L / AST: 57 U/L / GGT: x           PT/INR - ( 03 Aug 2022 04:07 )   PT: 11.8 sec;   INR: 1.02 ratio         PTT - ( 03 Aug 2022 04:07 )  PTT:93.5 sec  Creatine Kinase, Serum: 355 U/L ( @ 07:45)  CKMB Units: 32.0 ng/mL ( @ 07:45)  CKMB Units: 63.2 ng/mL ( @ 17:12)  Creatine Kinase, Serum: 466 U/L ( @ 17:12)  Creatine Kinase, Serum: 470 U/L ( @ 11:09)  CKMB Units: 72.7 ng/mL ( @ 11:09)    CARDIAC MARKERS ( 03 Aug 2022 07:45 )  x     / x     / 355 U/L / x     / 32.0 ng/mL  CARDIAC MARKERS ( 02 Aug 2022 17:12 )  x     / x     / 466 U/L / x     / 63.2 ng/mL  CARDIAC MARKERS ( 02 Aug 2022 11:09 )  x     / x     / 470 U/L / x     / 72.7 ng/mL  CARDIAC MARKERS ( 02 Aug 2022 04:07 )  x     / x     / 386 U/L / x     / 57.1 ng/mL  CARDIAC MARKERS ( 01 Aug 2022 20:29 )  x     / x     / 171 U/L / x     / 19.7 ng/mL  CARDIAC MARKERS ( 01 Aug 2022 15:26 )  x     / x     / 78 U/L / x     / 2.0 ng/mL      Urinalysis Basic - ( 03 Aug 2022 08:40 )    Color: Light Yellow / Appearance: Clear / S.015 / pH: x  Gluc: x / Ketone: Trace  / Bili: Negative / Urobili: Negative   Blood: x / Protein: Negative / Nitrite: Negative   Leuk Esterase: Large / RBC: 1 /hpf / WBC 51 /HPF   Sq Epi: x / Non Sq Epi: 0 /hpf / Bacteria: Many        TELEMETRY:     EKG:     IMAGING:

## 2022-08-03 NOTE — PROGRESS NOTE ADULT - SUBJECTIVE AND OBJECTIVE BOX
LANIE BERTRAND  MRN-[id number]    Patient is a 82y old  Female who presents with a chief complaint of abnormal imaging (03 Aug 2022 05:16)      Review of System  REVIEW OF SYSTEMS      General:	Denies fatigue, fevers, chills, sweats, decreased appetite.    Skin/Breast: denies pruritis, rash  	  Ophthalmologic: no change in vision or blurring  	  HEENT	Denies dry mouth, oral sores, dysphagia,  change in hearing.    Respiratory and Thorax:  cough, sob, wheeze, hemoptysis  	  Cardiovascular:	no cp , palp, orthopnea    Gastrointestinal:	no n/v/d constipation    Genitourinary:	no dysuria of frequency, no hematuria, no flank pain    Musculoskeletal:	no bone or joint pain. no muscle aches.     Neurological:	no change in sensory or motor function. no headache. no weakness.     Psychiatric:	no depression, no anxiety, insomnia.     Hematology/Lymphatics:	no bleeding or bruising        Current Meds  MEDICATIONS  (STANDING):  aspirin enteric coated 81 milliGRAM(s) Oral daily  atorvastatin 80 milliGRAM(s) Oral at bedtime  dexAMETHasone  Injectable 2 milliGRAM(s) IV Push every 12 hours  gabapentin 200 milliGRAM(s) Oral at bedtime  heparin  Infusion 1050 Unit(s)/Hr (7 mL/Hr) IV Continuous <Continuous>  insulin glargine Injectable (LANTUS) 20 Unit(s) SubCutaneous at bedtime  insulin lispro (ADMELOG) corrective regimen sliding scale   SubCutaneous Before meals and at bedtime  insulin lispro Injectable (ADMELOG) 5 Unit(s) SubCutaneous before lunch  insulin lispro Injectable (ADMELOG) 7 Unit(s) SubCutaneous before breakfast  insulin lispro Injectable (ADMELOG) 7 Unit(s) SubCutaneous before dinner  losartan 100 milliGRAM(s) Oral daily  metoprolol tartrate 12.5 milliGRAM(s) Oral two times a day  pantoprazole    Tablet 40 milliGRAM(s) Oral before breakfast  polyethylene glycol 3350 17 Gram(s) Oral two times a day  senna 2 Tablet(s) Oral at bedtime  valproate sodium  IVPB 500 milliGRAM(s) IV Intermittent every 8 hours    MEDICATIONS  (PRN):  bisacodyl 5 milliGRAM(s) Oral daily PRN Constipation  melatonin 5 milliGRAM(s) Oral at bedtime PRN Sleep      Vitals  Vital Signs Last 24 Hrs  T(C): 36.4 (02 Aug 2022 19:00), Max: 36.8 (02 Aug 2022 08:00)  T(F): 97.6 (02 Aug 2022 19:00), Max: 98.2 (02 Aug 2022 08:00)  HR: 77 (03 Aug 2022 04:00) (59 - 83)  BP: 117/57 (03 Aug 2022 04:00) (77/48 - 156/97)  BP(mean): 80 (03 Aug 2022 04:00) (58 - 121)  RR: 26 (03 Aug 2022 04:00) (16 - 32)  SpO2: 95% (03 Aug 2022 04:00) (93% - 100%)    Parameters below as of 03 Aug 2022 04:00  Patient On (Oxygen Delivery Method): room air        Physical Exam  PHYSICAL EXAM:      Constitutional: NAD    Eyes: PERRLA EOMI, anicteric sclera    Heent :No oral sores, no pharyngeal injection. moist mucosa.    Neck: supple, no jvd, no LAD    Respiratory: CTA b/l     Cardiovascular: s1s2, no m/g/r    Gastrointestinal: soft, nt, nd, + BS    Extremities: no c/c/e    Neurological:A&O x 3 moves all ext.    Skin: no rash on exposed skin    Lymph Nodes: no lymphadenopathy.              Lab  CBC Full  -  ( 03 Aug 2022 04:07 )  WBC Count : 18.86 K/uL  RBC Count : 4.32 M/uL  Hemoglobin : 13.2 g/dL  Hematocrit : 40.4 %  Platelet Count - Automated : 230 K/uL  Mean Cell Volume : 93.5 fl  Mean Cell Hemoglobin : 30.6 pg  Mean Cell Hemoglobin Concentration : 32.7 gm/dL  Auto Neutrophil # : 15.55 K/uL  Auto Lymphocyte # : 1.90 K/uL  Auto Monocyte # : 1.02 K/uL  Auto Eosinophil # : 0.01 K/uL  Auto Basophil # : 0.04 K/uL  Auto Neutrophil % : 82.4 %  Auto Lymphocyte % : 10.1 %  Auto Monocyte % : 5.4 %  Auto Eosinophil % : 0.1 %  Auto Basophil % : 0.2 %    08-03    140  |  102  |  56<H>  ----------------------------<  169<H>  4.5   |  25  |  1.28    Ca    8.9      03 Aug 2022 04:07  Phos  2.7     08-03  Mg     2.2     08-03    TPro  5.5<L>  /  Alb  2.8<L>  /  TBili  0.2  /  DBili  x   /  AST  57<H>  /  ALT  16  /  AlkPhos  66  08-03    PT/INR - ( 03 Aug 2022 04:07 )   PT: 11.8 sec;   INR: 1.02 ratio         PTT - ( 03 Aug 2022 04:07 )  PTT:93.5 sec    Rad:    Assessment/Plan   LANIE BERTRAND  MRN-[id number]    Patient is a 82y old  Female who presents with a chief complaint of abnormal imaging (03 Aug 2022 05:16)      Review of System    Seen together with unit staff at bedside  lethargic at times    Current Meds  MEDICATIONS  (STANDING):  aspirin enteric coated 81 milliGRAM(s) Oral daily  atorvastatin 80 milliGRAM(s) Oral at bedtime  dexAMETHasone  Injectable 2 milliGRAM(s) IV Push every 12 hours  gabapentin 200 milliGRAM(s) Oral at bedtime  heparin  Infusion 1050 Unit(s)/Hr (7 mL/Hr) IV Continuous <Continuous>  insulin glargine Injectable (LANTUS) 20 Unit(s) SubCutaneous at bedtime  insulin lispro (ADMELOG) corrective regimen sliding scale   SubCutaneous Before meals and at bedtime  insulin lispro Injectable (ADMELOG) 5 Unit(s) SubCutaneous before lunch  insulin lispro Injectable (ADMELOG) 7 Unit(s) SubCutaneous before breakfast  insulin lispro Injectable (ADMELOG) 7 Unit(s) SubCutaneous before dinner  losartan 100 milliGRAM(s) Oral daily  metoprolol tartrate 12.5 milliGRAM(s) Oral two times a day  pantoprazole    Tablet 40 milliGRAM(s) Oral before breakfast  polyethylene glycol 3350 17 Gram(s) Oral two times a day  senna 2 Tablet(s) Oral at bedtime  valproate sodium  IVPB 500 milliGRAM(s) IV Intermittent every 8 hours    MEDICATIONS  (PRN):  bisacodyl 5 milliGRAM(s) Oral daily PRN Constipation  melatonin 5 milliGRAM(s) Oral at bedtime PRN Sleep    Vital Signs Last 24 Hrs  T(C): 36.4 (02 Aug 2022 19:00), Max: 36.8 (02 Aug 2022 08:00)  T(F): 97.6 (02 Aug 2022 19:00), Max: 98.2 (02 Aug 2022 08:00)  HR: 77 (03 Aug 2022 04:00) (59 - 83)  BP: 117/57 (03 Aug 2022 04:00) (77/48 - 156/97)  BP(mean): 80 (03 Aug 2022 04:00) (58 - 121)  RR: 26 (03 Aug 2022 04:00) (16 - 32)  SpO2: 95% (03 Aug 2022 04:00) (93% - 100%)    Parameters below as of 03 Aug 2022 04:00  Patient On (Oxygen Delivery Method): room air      PHYSICAL EXAM:     NAD    Lab  CBC Full  -  ( 03 Aug 2022 04:07 )  WBC Count : 18.86 K/uL  RBC Count : 4.32 M/uL  Hemoglobin : 13.2 g/dL  Hematocrit : 40.4 %  Platelet Count - Automated : 230 K/uL  Mean Cell Volume : 93.5 fl  Mean Cell Hemoglobin : 30.6 pg  Mean Cell Hemoglobin Concentration : 32.7 gm/dL  Auto Neutrophil # : 15.55 K/uL  Auto Lymphocyte # : 1.90 K/uL  Auto Monocyte # : 1.02 K/uL  Auto Eosinophil # : 0.01 K/uL  Auto Basophil # : 0.04 K/uL  Auto Neutrophil % : 82.4 %  Auto Lymphocyte % : 10.1 %  Auto Monocyte % : 5.4 %  Auto Eosinophil % : 0.1 %  Auto Basophil % : 0.2 %    08-03    140  |  102  |  56<H>  ----------------------------<  169<H>  4.5   |  25  |  1.28    Ca    8.9      03 Aug 2022 04:07  Phos  2.7     08-03  Mg     2.2     08-03    TPro  5.5<L>  /  Alb  2.8<L>  /  TBili  0.2  /  DBili  x   /  AST  57<H>  /  ALT  16  /  AlkPhos  66  08-03    PT/INR - ( 03 Aug 2022 04:07 )   PT: 11.8 sec;   INR: 1.02 ratio         PTT - ( 03 Aug 2022 04:07 )  PTT:93.5 sec    Rad:    Assessment/Plan

## 2022-08-03 NOTE — PROGRESS NOTE ADULT - SUBJECTIVE AND OBJECTIVE BOX
Patient seen and examined at bedside.    --Anticoagulation--  aspirin enteric coated 81 milliGRAM(s) Oral daily  heparin  Infusion 1050 Unit(s)/Hr IV Continuous <Continuous>    T(C): 36.4 (08-02-22 @ 19:00), Max: 36.8 (08-02-22 @ 03:00)  HR: 75 (08-03-22 @ 02:00) (59 - 83)  BP: 143/65 (08-03-22 @ 02:00) (77/48 - 156/97)  RR: 25 (08-03-22 @ 02:00) (16 - 32)  SpO2: 97% (08-03-22 @ 02:00) (93% - 100%)  Wt(kg): --    Exam: Ox3, PERRL, EOMI, no facial/drift, DE LA GARZA 5/5, SILT

## 2022-08-03 NOTE — PROGRESS NOTE ADULT - ASSESSMENT
82F, s/p Right crani for tumor resection  -Adm CICU, q4h neurochecks  -Per CICU, on ASA81, hep gtt PTT goal 60-90, no need for Cangrelor  -Latest PTT 62.0  -Pending transfer out of CICU

## 2022-08-03 NOTE — PROGRESS NOTE ADULT - SUBJECTIVE AND OBJECTIVE BOX
SUMMARY:  81 y/o female (former smoker) with PMHx of HTN, CAD, HLD, MI, peripheral neuropathy, DM and peripheral artery disease presented to the ED for imaging for her head due to abnormal spine MRI by her orthopedist (Dr. Castro) during workup for her spine for her chronic back problems. The orthopedist noted something in her brain. Otherwise pt has no symptoms, stated that since 04/2022 she has had gait/balance issues. Endorsed that if she sits for a while and gets up, she feels like she is "drunk" and feels off. Denied chest pain, SOB, fevers, chills, numbness/paresthesias, muscular weakness, dizziness, abdominal pain, headaches. MR head significant for R temporal lobe lesion with edema, midline shift and uncal herniation concerning for metastatic disease. 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     HOSPITAL COURSE: R crani for metastatic lesion 7/27 consistent w metastatic non small cell lung cancer, residual lesion on L side of brain. Course complicated on the hospital floors by inferolateral STEMI 8/1, pt transferred to CICU and started on hep gtt no bolus and ASA, no cath given high risk from comorbidities and recent surgery.     24 HOUR EVENTS: Decreasing mental status this AM, back to baseline after straight cath, UA+, started on zosyn, cultures sent.       REVIEW OF SYSTEMS: Denies any symptoms    ALLERGIES: Allergies    adhesives (Pruritus)  No Known Drug Allergies    Intolerances        VITALS/DATA/ORDERS: [x] Reviewed    DEVICES:   [] Restraints [x] PIVs [] ET tube [] central line [] PICC [] arterial line [] bob [] NGT/OGT [] EVD [] LD [] TRICIA/HMV [] LiCOX [] ICP monitor [] Trach [] PEG [] Chest Tube [] other:    EXAMINATION:  General: No acute distress, conversational  HEENT: Anicteric sclerae  Cardiac: [id number]  Lungs: Clear  Abdomen: Soft, non-tender, +BS  Extremities: No c/c/e  Skin/Incision Site: Clean, dry and intact  Neurologic: Awake, alert, fully oriented, follows commands, PERRL, EOMI, ?R nasolabial flattening, tongue midline, no drift, full strength SUMMARY:  83 y/o female (former smoker) with PMHx of HTN, CAD, HLD, MI, peripheral neuropathy, DM and peripheral artery disease presented to the ED for imaging for her head due to abnormal spine MRI by her orthopedist (Dr. Castro) during workup for her spine for her chronic back problems. The orthopedist noted something in her brain. Otherwise pt has no symptoms, stated that since 04/2022 she has had gait/balance issues. Endorsed that if she sits for a while and gets up, she feels like she is "drunk" and feels off. Denied chest pain, SOB, fevers, chills, numbness/paresthesias, muscular weakness, dizziness, abdominal pain, headaches. MR head significant for R temporal lobe lesion with edema, midline shift and uncal herniation concerning for metastatic disease. 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     HOSPITAL COURSE: R crani for metastatic lesion 7/27 consistent w metastatic non small cell lung cancer, residual lesion on L side of brain. Course complicated on the hospital floors by inferolateral STEMI 8/1, pt transferred to CICU and started on hep gtt no bolus and ASA, no cath given high risk from comorbidities and recent surgery.     24 HOUR EVENTS:   - started on zosyn for +UA, transient pressor requirements overnight i nthe s/o ?urosepsis; brodened to includ vanc, pending MRSA Swab  - given 500cc IVB, new onset afib rvr      REVIEW OF SYSTEMS: Denies any symptoms    ALLERGIES: Allergies        VITALS/DATA/ORDERS: [x] Reviewed      EXAMINATION:  General: No acute distress, conversational  HEENT: Anicteric sclerae  Cardiac: [id number]  Lungs: Clear  Abdomen: Soft, non-tender, +BS  Extremities: No c/c/e  Skin/Incision Site: Clean, dry and intact  Neurologic: AOx0, ROBERTH intermittently, PERRL, EOMI, ?R nasolabial flattening, tongue midline, no drift, DE [place] GARZA spont/to nox

## 2022-08-03 NOTE — PROGRESS NOTE ADULT - ASSESSMENT
ASSESSMENT:    81 y/o female (former smoker) with PMHx of HTN, CAD, HLD, MI, peripheral neuropathy, DM and peripheral artery disease presented to the ED for imaging for her head due to abnormal spine MRI by her orthopedist (Dr. Castro) during workup for her spine for her chronic back problems. The orthopedist noted something in her brain. Otherwise pt has no symptoms, stated that since 04/2022 she has had gait/balance issues. Endorsed that if she sits for a while and gets up, she feels like she is "drunk" and feels off. Denied chest pain, SOB, fevers, chills, numbness/paresthesias, muscular weakness, dizziness, abdominal pain, headaches.     NSCU consulted for encephalopathy in s/o likely UTI/early urosepsis, rule out seizures    NEURO:  [id number]  CT Head if any mental status changes  Pain control  VEEG  cont steroids taper per nsgy and keppra per nsgy, no hx of seizures  Activity: [] OOB as tolerated [] Bedrest [x] PT [x] OT [] PMNR    PULM: RA  s/p bronchoscopy with endobronchial brushings/biopsies of an obstructing left upper lobe endobronchial lesion -> 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; No O2 requirements, pulmonary on board  Incentive spirometry, mobilize as tolerated    CV:  Keep -160mmHg  EF 55%  STEMI being treated medically, cont hep gtt goal 55-65 monitor ptt, cont ASA  cardiology following  cont losartan  cont lopressor    RENAL: borderline PERCY w high BUN, continue hydration if ok w cardiology keep net even to 500cc+  cont straight caths, bladder [id number]  See ID for UA+  check ur lytes    GI: reg diet  GI prophylaxis x PPI [] other:  Bowel regimen standing    ENDO: Endo following  outpt FU for adrenal and thyroid abnormalities  ISS, premeals and lantus 20U  Goal euglycemia (-180)    HEME/ONC: hep gtt  small RML PE on CTA today, on hep gtt, if any worsening O2 requirements, consult cardio interventional  SCDs    ID: on zosyn, 5 day course  WBC high, could be steroid induced hyperglycemia vs new UTI  FU blood and urine cultures  consult ID      MISC:    SOCIAL/FAMILY:  [] awaiting [x] updated at bedside [] family meeting    CODE STATUS:  [x] Full Code [] DNR [] DNI [] Palliative/Comfort Care    DISPOSITION:  [x] ICU [] Stroke Unit [] Floor [] EMU [] RCU [] PCU    [] Patient is at high risk of neurologic deterioration/death due to:     Time seen:  Time spent: ___ [] critical care minutes    Contact: [phone]

## 2022-08-03 NOTE — CHART NOTE - NSCHARTNOTEFT_GEN_A_CORE
Nutrition Follow Up Note  LANIE BERTRAND  Patient seen for: nutrition follow-up and transfer to CICU     Hospital course per chart: Pt is an 81 yo female who presented with abnormal MRI reading, also with gait/balance issues since April, found to have R temporal brain mass concerning for metastatic brain lesion now s/p craniotomy 7/27. While pending surgery, AC held, and on 8/1 presented with CP found to have STEMI. Transferred to CICU for further management. Chart reviewed, events noted.    - S/p bronchoscopy, biopsy consistent with poorly differentiated adenocarcinoma of lung origin     Source: [] Patient       [x] EMR        [] RN        [] Family at bedside       [] Other:    -If unable to interview patient: [] Trach/Vent/BiPAP  [x] Disoriented/confused/inappropriate to interview  - Patient noted to be lethargic, obtunded this morning    Diet Order:   Diet, Regular:   Consistent Carbohydrate {Evening Snack} (CSTCHOSN)  DASH/TLC {Sodium & Cholesterol Restricted} (DASH) (08-02-22)    Is current diet order appropriate/adequate? [x] Yes  []  No:     PO intake: Previously noted with fair PO intake, no PO intake this morning in setting of AMS     Nutrition-related concerns:  - Hyperglycemia noted, ordered for Consistent Carbohydrate diet, Lantus, Admelog. Endocrinology following. Will continue to monitor as able.    GI:  Last BM 8/1.   Bowel Regimen? [x] Yes   [] No    Weights: no new weights to assess at this time  - Will continue to monitor and trend weights as able     MEDICATIONS  (STANDING):  atorvastatin  dexAMETHasone  Injectable  insulin glargine Injectable (LANTUS)  insulin lispro (ADMELOG) corrective regimen sliding scale  insulin lispro Injectable (ADMELOG)  insulin lispro Injectable (ADMELOG)  insulin lispro Injectable (ADMELOG)  losartan  metoprolol tartrate  pantoprazole    Tablet  polyethylene glycol 3350  senna    Pertinent Labs: 08-03 @ 04:07: Na 140, BUN 56<H>, Cr 1.28, <H>, K+ 4.5, Phos 2.7, Mg 2.2, Alk Phos 66, ALT/SGPT 16, AST/SGOT 57<H>, HbA1c --    A1C with Estimated Average Glucose Result: 8.7 % (07-20-22 @ 06:50)    Finger Sticks:  POCT Blood Glucose.: 139 mg/dL (08-03 @ 04:45)  POCT Blood Glucose.: 318 mg/dL (08-02 @ 22:27)  POCT Blood Glucose.: 255 mg/dL (08-02 @ 17:55)  POCT Blood Glucose.: 130 mg/dL (08-02 @ 13:20)  POCT Blood Glucose.: 152 mg/dL (08-02 @ 11:17)    Triglycerides, Serum: 73 mg/dL (08-01-22 @ 20:29)    Skin per nursing documentation: +surgical incision, no pressure injuries  Edema: no edema per flowsheets     Estimated needs based on dosing weight 73.5 kg   Estimated Energy Needs (25-30 kcal/kg):2648-8753 kcal/day  Estimated Protein Needs (1.2-1.6 g/kg):  g/day  Defer fluids to team     Previous Nutrition Diagnosis: Increased Nutrient Needs  Nutrition Diagnosis is: [x] ongoing  [] resolved [] not applicable   Being addressed with PO diet    New Nutrition Diagnosis:   1) Inadequate Oral Intake related to altered mental status/acute on chronic illness as evidenced by documented suboptimal PO intake in-house.  Goal: Pt to meet >80% of estimated nutritional needs during hospital stay.   2) Altered Nutrition Related Lab Values related to suboptimal glycemic control as evidenced by elevated glucose fingersticks, HbA1c 8.7%.   Goal: Pt's glucose to trend toward goal in-house.    Nutrition Care Plan:  [x] In Progress  [] Achieved  [] Not applicable    Nutrition Interventions:     Education Provided:       [] Yes:  [x] No: inappropriate at this time in setting of AMS       Recommendations:      1) As medically feasible, continue consistent carbohydrate (evening snack), DASH/TLC diet. Adjust diet as needed.  2) As medically feasible, recommend adding Glucerna 2 daily to optimize intake in setting of increased nutrient needs  3) Add multivitamin if no medical contraindications  4) Please obtain new weight via zeroed bed scale as able    Monitoring and Evaluation: Monitor PO intake, weight, labs, skin, GI status, diet     RD remains available upon request and will follow up per protocol.  Magali Johnston MS RD CDN [facility] Pager #[phone]

## 2022-08-03 NOTE — PROGRESS NOTE ADULT - ASSESSMENT
[facility] 6/23/21:   s/p successful angioplasty to the mid RCA followed by intracoronary brachytherapy.  [facility] 4/14/21: POBA of mid RCA (80%)   [facility] 3/25/21: PCI/POBA to pCX 90%   Echo 7/22/22: Normal left ventricular internal dimensions with discrete upper septal hypertrophy. Estimated ejection fraction 55%, The inferolateral wall is akinetic.  Echo 8/2/22:  grossly, preserved left ventricular systolic function with segmental wall motion abnormalities. EF approximately 55%. The mid to distal inferolateral and mid to distal inferior segments are hypkinetic. The apex is akinetic. No left ventricular thrombus seen.      a/p  81 y/o female (former smoker) with PMHx of HTN, CAD sp PCi,  HLD, MI, peripheral neuropathy, DM and peripheral artery disease presented with abnl imaging on MRI as outpt      #Brain/ Lung  mass   -CT head/ CT chest noted   -work up hem/onc   -neurosx fu noted MRI brain showed 2 mets R temporal 4.7x3.7x3cm L frontal 1.4x1.5x1.2cm   -s/p bronchoscopy  -s/p Right craniotomy for tumor  -management per neuro-- started on antiplt/ hep as mentioned below- repeat CT head  8/3 noted -neurosx to fu   -eeg       #STEMI   -8/1 co cp-  found to have an inferior STEMI  -per cath lab  attending -Not a PCI candidate given potential benefits would be outweighed by the risks of the burden of anticoagulation and antiplatelet agents required for therapy  due to recent brain surgery  -now in  CICU for medical management  -on asa,  hep gtt   -limited echo grossly, preserved left ventricular systolic function with segmental wall motion abnormalities. EF approximately 55%. The mid to distal inferolateral and mid to distal inferior segments are hypkinetic. The apex is akinetic. No left ventricular thrombus seen.  -c/w statin ,  bb, arb  -ccu fu   -monitor ct head - neuro sx following     #CAD sp PCI   -antiplt/ac as mentioned above  -echo w inflat akinesis and overall preserved EF  -repeat limited echo as mentioned above   -c/w BB     #HTN  -stable, c/w meds    dvt ppx

## 2022-08-03 NOTE — PROGRESS NOTE ADULT - PROBLEM SELECTOR PLAN 1
Suggest to hold standing bolus dose if patient is not eating.  Will continue current insulin regimen for now. Will continue monitoring FS and FU, will adjust dose as steroids are tapered/dc.  If blood sugars are stable, can DC on current insulin regimen, FU with her Endocrinologist Dr Chaz Richardson 2weeks.  Discussed plan with patient. Counseled that blood sugars will trend down as steroids are tapered/dc, counseled that insulin dose will have to be adjusted based on blood sugars/steroid. Counseled for compliance with consistent low carb diet and exercise as tolerated outpatient.

## 2022-08-03 NOTE — PROGRESS NOTE ADULT - ASSESSMENT
ASSESSMENT:    81 y/o female (former smoker) with PMHx of HTN, CAD, HLD, MI, peripheral neuropathy, DM and peripheral artery disease presented to the ED for imaging for her head due to abnormal spine MRI by her orthopedist (Dr. Castro) during workup for her spine for her chronic back problems. The orthopedist noted something in her brain. Otherwise pt has no symptoms, stated that since 04/2022 she has had gait/balance issues. Endorsed that if she sits for a while and gets up, she feels like she is "drunk" and feels off. Denied chest pain, SOB, fevers, chills, numbness/paresthesias, muscular weakness, dizziness, abdominal pain, headaches.     NSCU consulted for encephalopathy in s/o likely UTI/early urosepsis, rule out seizures    NEURO:  [id number]  CT Head if any mental status changes  Pain control  VEEG  cont steroids taper per nsgy and keppra per nsgy, no hx of seizures  Activity: [] OOB as tolerated [] Bedrest [x] PT [x] OT [] PMNR    PULM: RA  s/p bronchoscopy with endobronchial brushings/biopsies of an obstructing left upper lobe endobronchial lesion -> 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; No O2 requirements, pulmonary on board  Incentive spirometry, mobilize as tolerated    CV:  Keep -160mmHg  EF 55%  STEMI being treated medically, cont hep gtt goal 55-65 monitor ptt, cont ASA  cardiology following  cont losartan  cont lopressor    RENAL: borderline PERCY w high BUN, continue hydration if ok w cardiology keep net even to 500cc+  cont straight caths, bladder [id number]  See ID for UA+  check ur lytes    GI: reg diet  GI prophylaxis x PPI [] other:  Bowel regimen standing    ENDO: Endo following  outpt FU for adrenal and thyroid abnormalities  ISS, premeals and lantus 20U  Goal euglycemia (-180)    HEME/ONC: hep gtt  small RML PE on CTA today, on hep gtt, if any worsening O2 requirements, consult cardio interventional  SCDs    ID: on zosyn, 5 day course  WBC high, could be steroid induced hyperglycemia vs new UTI  FU blood and urine cultures  consult ID      MISC:    SOCIAL/FAMILY:  [] awaiting [x] updated at bedside [] family meeting    CODE STATUS:  [x] Full Code [] DNR [] DNI [] Palliative/Comfort Care    DISPOSITION:  [x] ICU [] Stroke Unit [] Floor [] EMU [] RCU [] PCU    [] Patient is at high risk of neurologic deterioration/death due to:     Time seen:  Time spent: ___ [] critical care minutes    Contact: [phone] ASSESSMENT:    81 y/o female (former smoker) with PMHx of HTN, CAD, HLD, MI, peripheral neuropathy, DM and peripheral artery disease presented to the ED for imaging for her head due to abnormal spine MRI by her orthopedist (Dr. Castro) during workup for her spine for her chronic back problems. The orthopedist noted something in her brain. Otherwise pt has no symptoms, stated that since 04/2022 she has had gait/balance issues. Endorsed that if she sits for a while and gets up, she feels like she is "drunk" and feels off. Denied chest pain, SOB, fevers, chills, numbness/paresthesias, muscular weakness, dizziness, abdominal pain, headaches.     NSCU consulted for encephalopathy in s/o likely UTI/early urosepsis, rule out seizures    NEURO:  [id number]  CT Head if any mental status changes  Pain control  VEEG to r/o sz given mental status change  cont steroids taper per nsgy and keppra per nsgy, no hx of seizures  Activity: [] OOB as tolerated [] Bedrest [x] PT [x] OT [] PMNR    PULM: RA  s/p bronchoscopy with endobronchial brushings/biopsies of an obstructing left upper lobe endobronchial lesion -> 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; No O2 requirements, pulmonary on board  Incentive spirometry, mobilize as tolerated    CV:  Keep -160mmHg (MAP >65 in the s/o sepsis)  EF 55%  STEMI being treated medically, cont hep gtt goal 55-65 monitor ptt, cont ASA  cardiology following  cont losartan  cont lopressor  new onset afib  levo PRN    RENAL: borderline PERCY w high BUN, continue hydration if ok w cardiology keep net even to 500cc+  cont straight caths, bladder [id number]  See ID for UA+  check ur lytes    GI: reg diet  GI prophylaxis x PPI [] other:  Bowel regimen standing    ENDO: Endo following  outpt FU for adrenal and thyroid abnormalities  ISS, premeals and lantus 20U  Goal euglycemia (-180)    HEME/ONC: hep gtt  small RML PE on CTA today, on hep gtt, if any worsening O2 requirements, consult cardio interventional  SCDs    ID: on zosyn, 5 day course  WBC high, could be steroid induced hyperglycemia vs new UTI  FU blood and urine cultures  consult ID  vanc empricically  MRSA swab      MISC:    SOCIAL/FAMILY:  [] awaiting [x] updated at bedside [] family meeting    CODE STATUS:  [x] Full Code [] DNR [] DNI [] Palliative/Comfort Care    DISPOSITION:  [x] ICU [] Stroke Unit [] Floor [] EMU [] RCU [] PCU    [] Patient is at high risk of neurologic deterioration/death due to:     Time seen:  Time spent: ___ [] critical care minutes    Contact: [phone] ASSESSMENT:    81 y/o female (former smoker) with PMHx of HTN, CAD, HLD, MI, peripheral neuropathy, DM and peripheral artery disease presented to the ED for imaging for her head due to abnormal spine MRI by her orthopedist (Dr. Castro) during workup for her spine for her chronic back problems. The orthopedist noted something in her brain. Otherwise pt has no symptoms, stated that since 04/2022 she has had gait/balance issues. Endorsed that if she sits for a while and gets up, she feels like she is "drunk" and feels off. Denied chest pain, SOB, fevers, chills, numbness/paresthesias, muscular weakness, dizziness, abdominal pain, headaches.     NSCU consulted for encephalopathy in s/o likely UTI/early urosepsis, rule out seizures    NEURO:  [id number]  CT Head if any mental status changes  Pain control  VEEG to r/o sz given mental status change  cont steroids taper per nsgy and keppra per nsgy, no hx of seizures  Activity: [] OOB as tolerated [] Bedrest [x] PT [x] OT [] PMNR    PULM: RA  s/p bronchoscopy with endobronchial brushings/biopsies of an obstructing left upper lobe endobronchial lesion -> 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; No O2 requirements, pulmonary on board  Incentive spirometry, mobilize as tolerated  on 4L NC, wean as tolerated  small incidental PE  Repeat CXR    CV:  Keep -160mmHg (MAP >65 in the s/o sepsis)  EF 55%  STEMI being treated medically, cont hep gtt goal 55-65 monitor ptt, cont ASA  cardiology following  cont losartan  cont lopressor  new onset afib  levo PRN    RENAL: borderline PERCY w high BUN, continue hydration if ok w cardiology keep net even to 500cc+  cont straight caths, bladder [id number]  See ID for UA+  check ur lytes    GI: reg diet  GI prophylaxis x PPI [] other:  Bowel regimen standing    ENDO: Endo following  outpt FU for adrenal and thyroid abnormalities  ISS, premeals and lantus 20U  Goal euglycemia (-180)    HEME/ONC: hep gtt  small RML PE on CTA today, on hep gtt, if any worsening O2 requirements, consult cardio interventional  SCDs    ID: on zosyn, 5 day course  WBC high, could be steroid induced hyperglycemia vs new UTI  FU blood and urine cultures  consult ID  vanc empricically  MRSA swab      MISC:    SOCIAL/FAMILY:  [] awaiting [x] updated at bedside [] family meeting    CODE STATUS:  [x] Full Code [] DNR [] DNI [] Palliative/Comfort Care    DISPOSITION:  [x] ICU [] Stroke Unit [] Floor [] EMU [] RCU [] PCU    [] Patient is at high risk of neurologic deterioration/death due to:     Time seen:  Time spent: ___ [] critical care minutes    Contact: [phone]

## 2022-08-03 NOTE — PROGRESS NOTE ADULT - ASSESSMENT
1) Lung and brain masses likely a metastatic process.  Lung bx c/w metastatic adenocarcinoma  - pt s/p nsgy, resection of brain met  patient has also been seen by rad/Onc. Getting steroids and keppra.   Further med onc mgmt as outpt  pathology being sent for foundation testing.     2)Leukocytosis- likely reactive/steroid related    3) CAD- s/p MI. mgmt as per mrd, cv

## 2022-08-04 LAB
ALBUMIN SERPL ELPH-MCNC: 2.2 G/DL — LOW (ref 3.3–5)
ALBUMIN SERPL ELPH-MCNC: 3 G/DL — LOW (ref 3.3–5)
ALP SERPL-CCNC: 57 U/L — SIGNIFICANT CHANGE UP (ref 40–120)
ALP SERPL-CCNC: 79 U/L — SIGNIFICANT CHANGE UP (ref 40–120)
ALT FLD-CCNC: 17 U/L — SIGNIFICANT CHANGE UP (ref 10–45)
ALT FLD-CCNC: 22 U/L — SIGNIFICANT CHANGE UP (ref 10–45)
ANION GAP SERPL CALC-SCNC: 12 MMOL/L — SIGNIFICANT CHANGE UP (ref 5–17)
ANION GAP SERPL CALC-SCNC: 14 MMOL/L — SIGNIFICANT CHANGE UP (ref 5–17)
ANION GAP SERPL CALC-SCNC: 16 MMOL/L — SIGNIFICANT CHANGE UP (ref 5–17)
APTT BLD: 54.5 SEC — HIGH (ref 27.5–35.5)
APTT BLD: 58.3 SEC — HIGH (ref 27.5–35.5)
APTT BLD: 64.6 SEC — HIGH (ref 27.5–35.5)
APTT BLD: >200 SEC — CRITICAL HIGH (ref 27.5–35.5)
AST SERPL-CCNC: 37 U/L — SIGNIFICANT CHANGE UP (ref 10–40)
AST SERPL-CCNC: 48 U/L — HIGH (ref 10–40)
BASE EXCESS BLDV CALC-SCNC: 2.1 MMOL/L — HIGH (ref -2–2)
BILIRUB SERPL-MCNC: 0.4 MG/DL — SIGNIFICANT CHANGE UP (ref 0.2–1.2)
BILIRUB SERPL-MCNC: 0.5 MG/DL — SIGNIFICANT CHANGE UP (ref 0.2–1.2)
BLD GP AB SCN SERPL QL: NEGATIVE — SIGNIFICANT CHANGE UP
BUN SERPL-MCNC: 41 MG/DL — HIGH (ref 7–23)
BUN SERPL-MCNC: 42 MG/DL — HIGH (ref 7–23)
BUN SERPL-MCNC: 44 MG/DL — HIGH (ref 7–23)
CA-I SERPL-SCNC: 1.21 MMOL/L — SIGNIFICANT CHANGE UP (ref 1.15–1.33)
CALCIUM SERPL-MCNC: 8.2 MG/DL — LOW (ref 8.4–10.5)
CALCIUM SERPL-MCNC: 9 MG/DL — SIGNIFICANT CHANGE UP (ref 8.4–10.5)
CALCIUM SERPL-MCNC: 9.3 MG/DL — SIGNIFICANT CHANGE UP (ref 8.4–10.5)
CHLORIDE BLDV-SCNC: 99 MMOL/L — SIGNIFICANT CHANGE UP (ref 96–108)
CHLORIDE SERPL-SCNC: 100 MMOL/L — SIGNIFICANT CHANGE UP (ref 96–108)
CHLORIDE SERPL-SCNC: 103 MMOL/L — SIGNIFICANT CHANGE UP (ref 96–108)
CHLORIDE SERPL-SCNC: 99 MMOL/L — SIGNIFICANT CHANGE UP (ref 96–108)
CK MB BLD-MCNC: 7.1 % — HIGH (ref 0–3.5)
CK MB CFR SERPL CALC: 18.9 NG/ML — HIGH (ref 0–3.8)
CK SERPL-CCNC: 265 U/L — HIGH (ref 25–170)
CO2 BLDV-SCNC: 30 MMOL/L — HIGH (ref 22–26)
CO2 SERPL-SCNC: 20 MMOL/L — LOW (ref 22–31)
CO2 SERPL-SCNC: 24 MMOL/L — SIGNIFICANT CHANGE UP (ref 22–31)
CO2 SERPL-SCNC: 25 MMOL/L — SIGNIFICANT CHANGE UP (ref 22–31)
CREAT SERPL-MCNC: 1.05 MG/DL — SIGNIFICANT CHANGE UP (ref 0.5–1.3)
CREAT SERPL-MCNC: 1.06 MG/DL — SIGNIFICANT CHANGE UP (ref 0.5–1.3)
CREAT SERPL-MCNC: 1.13 MG/DL — SIGNIFICANT CHANGE UP (ref 0.5–1.3)
EGFR: 49 ML/MIN/1.73M2 — LOW
EGFR: 52 ML/MIN/1.73M2 — LOW
EGFR: 53 ML/MIN/1.73M2 — LOW
GAS PNL BLDV: 131 MMOL/L — LOW (ref 136–145)
GAS PNL BLDV: SIGNIFICANT CHANGE UP
GLUCOSE BLDC GLUCOMTR-MCNC: 186 MG/DL — HIGH (ref 70–99)
GLUCOSE BLDC GLUCOMTR-MCNC: 197 MG/DL — HIGH (ref 70–99)
GLUCOSE BLDC GLUCOMTR-MCNC: 198 MG/DL — HIGH (ref 70–99)
GLUCOSE BLDC GLUCOMTR-MCNC: 305 MG/DL — HIGH (ref 70–99)
GLUCOSE BLDV-MCNC: 294 MG/DL — HIGH (ref 70–99)
GLUCOSE SERPL-MCNC: 200 MG/DL — HIGH (ref 70–99)
GLUCOSE SERPL-MCNC: 264 MG/DL — HIGH (ref 70–99)
GLUCOSE SERPL-MCNC: 274 MG/DL — HIGH (ref 70–99)
HCO3 BLDV-SCNC: 28 MMOL/L — SIGNIFICANT CHANGE UP (ref 22–29)
HCT VFR BLD CALC: 34.9 % — SIGNIFICANT CHANGE UP (ref 34.5–45)
HCT VFR BLD CALC: 38.9 % — SIGNIFICANT CHANGE UP (ref 34.5–45)
HCT VFR BLD CALC: 44.3 % — SIGNIFICANT CHANGE UP (ref 34.5–45)
HCT VFR BLDA CALC: 44 % — SIGNIFICANT CHANGE UP (ref 34.5–46.5)
HGB BLD CALC-MCNC: 14.8 G/DL — SIGNIFICANT CHANGE UP (ref 11.7–16.1)
HGB BLD-MCNC: 11.5 G/DL — SIGNIFICANT CHANGE UP (ref 11.5–15.5)
HGB BLD-MCNC: 12.4 G/DL — SIGNIFICANT CHANGE UP (ref 11.5–15.5)
HGB BLD-MCNC: 14 G/DL — SIGNIFICANT CHANGE UP (ref 11.5–15.5)
HOROWITZ INDEX BLDV+IHG-RTO: 28 — SIGNIFICANT CHANGE UP
INR BLD: 0.99 RATIO — SIGNIFICANT CHANGE UP (ref 0.88–1.16)
INR BLD: 1.57 RATIO — HIGH (ref 0.88–1.16)
LACTATE BLDV-MCNC: 2.3 MMOL/L — HIGH (ref 0.7–2)
LACTATE SERPL-SCNC: 2.3 MMOL/L — HIGH (ref 0.7–2)
MAGNESIUM SERPL-MCNC: 1.8 MG/DL — SIGNIFICANT CHANGE UP (ref 1.6–2.6)
MAGNESIUM SERPL-MCNC: 2.4 MG/DL — SIGNIFICANT CHANGE UP (ref 1.6–2.6)
MAGNESIUM SERPL-MCNC: 2.5 MG/DL — SIGNIFICANT CHANGE UP (ref 1.6–2.6)
MCHC RBC-ENTMCNC: 30.7 PG — SIGNIFICANT CHANGE UP (ref 27–34)
MCHC RBC-ENTMCNC: 30.8 PG — SIGNIFICANT CHANGE UP (ref 27–34)
MCHC RBC-ENTMCNC: 31.2 PG — SIGNIFICANT CHANGE UP (ref 27–34)
MCHC RBC-ENTMCNC: 31.6 GM/DL — LOW (ref 32–36)
MCHC RBC-ENTMCNC: 31.9 GM/DL — LOW (ref 32–36)
MCHC RBC-ENTMCNC: 33 GM/DL — SIGNIFICANT CHANGE UP (ref 32–36)
MCV RBC AUTO: 94.6 FL — SIGNIFICANT CHANGE UP (ref 80–100)
MCV RBC AUTO: 96.5 FL — SIGNIFICANT CHANGE UP (ref 80–100)
MCV RBC AUTO: 97.1 FL — SIGNIFICANT CHANGE UP (ref 80–100)
MRSA PCR RESULT.: SIGNIFICANT CHANGE UP
NRBC # BLD: 0 /100 WBCS — SIGNIFICANT CHANGE UP (ref 0–0)
NT-PROBNP SERPL-SCNC: 4009 PG/ML — HIGH (ref 0–300)
NT-PROBNP SERPL-SCNC: 7071 PG/ML — HIGH (ref 0–300)
PCO2 BLDV: 49 MMHG — HIGH (ref 39–42)
PH BLDV: 7.37 — SIGNIFICANT CHANGE UP (ref 7.32–7.43)
PHOSPHATE SERPL-MCNC: 4 MG/DL — SIGNIFICANT CHANGE UP (ref 2.5–4.5)
PHOSPHATE SERPL-MCNC: 4.6 MG/DL — HIGH (ref 2.5–4.5)
PHOSPHATE SERPL-MCNC: 5.3 MG/DL — HIGH (ref 2.5–4.5)
PLATELET # BLD AUTO: 182 K/UL — SIGNIFICANT CHANGE UP (ref 150–400)
PLATELET # BLD AUTO: 219 K/UL — SIGNIFICANT CHANGE UP (ref 150–400)
PLATELET # BLD AUTO: 229 K/UL — SIGNIFICANT CHANGE UP (ref 150–400)
PO2 BLDV: 21 MMHG — LOW (ref 25–45)
POTASSIUM BLDV-SCNC: 4.4 MMOL/L — SIGNIFICANT CHANGE UP (ref 3.5–5.1)
POTASSIUM SERPL-MCNC: 4.3 MMOL/L — SIGNIFICANT CHANGE UP (ref 3.5–5.3)
POTASSIUM SERPL-MCNC: 4.5 MMOL/L — SIGNIFICANT CHANGE UP (ref 3.5–5.3)
POTASSIUM SERPL-MCNC: 4.7 MMOL/L — SIGNIFICANT CHANGE UP (ref 3.5–5.3)
POTASSIUM SERPL-SCNC: 4.3 MMOL/L — SIGNIFICANT CHANGE UP (ref 3.5–5.3)
POTASSIUM SERPL-SCNC: 4.5 MMOL/L — SIGNIFICANT CHANGE UP (ref 3.5–5.3)
POTASSIUM SERPL-SCNC: 4.7 MMOL/L — SIGNIFICANT CHANGE UP (ref 3.5–5.3)
PROT SERPL-MCNC: 5 G/DL — LOW (ref 6–8.3)
PROT SERPL-MCNC: 6.7 G/DL — SIGNIFICANT CHANGE UP (ref 6–8.3)
PROTHROM AB SERPL-ACNC: 11.4 SEC — SIGNIFICANT CHANGE UP (ref 10.5–13.4)
PROTHROM AB SERPL-ACNC: 18.3 SEC — HIGH (ref 10.5–13.4)
RBC # BLD: 3.69 M/UL — LOW (ref 3.8–5.2)
RBC # BLD: 4.03 M/UL — SIGNIFICANT CHANGE UP (ref 3.8–5.2)
RBC # BLD: 4.56 M/UL — SIGNIFICANT CHANGE UP (ref 3.8–5.2)
RBC # FLD: 12.5 % — SIGNIFICANT CHANGE UP (ref 10.3–14.5)
RBC # FLD: 12.5 % — SIGNIFICANT CHANGE UP (ref 10.3–14.5)
RBC # FLD: 12.6 % — SIGNIFICANT CHANGE UP (ref 10.3–14.5)
RH IG SCN BLD-IMP: POSITIVE — SIGNIFICANT CHANGE UP
S AUREUS DNA NOSE QL NAA+PROBE: SIGNIFICANT CHANGE UP
SAO2 % BLDV: 23 % — LOW (ref 67–88)
SODIUM SERPL-SCNC: 135 MMOL/L — SIGNIFICANT CHANGE UP (ref 135–145)
SODIUM SERPL-SCNC: 137 MMOL/L — SIGNIFICANT CHANGE UP (ref 135–145)
SODIUM SERPL-SCNC: 140 MMOL/L — SIGNIFICANT CHANGE UP (ref 135–145)
TROPONIN T, HIGH SENSITIVITY RESULT: 1535 NG/L — HIGH (ref 0–51)
TROPONIN T, HIGH SENSITIVITY RESULT: 1633 NG/L — HIGH (ref 0–51)
TROPONIN T, HIGH SENSITIVITY RESULT: 1971 NG/L — HIGH (ref 0–51)
WBC # BLD: 16.18 K/UL — HIGH (ref 3.8–10.5)
WBC # BLD: 17.41 K/UL — HIGH (ref 3.8–10.5)
WBC # BLD: 20.39 K/UL — HIGH (ref 3.8–10.5)
WBC # FLD AUTO: 16.18 K/UL — HIGH (ref 3.8–10.5)
WBC # FLD AUTO: 17.41 K/UL — HIGH (ref 3.8–10.5)
WBC # FLD AUTO: 20.39 K/UL — HIGH (ref 3.8–10.5)

## 2022-08-04 PROCEDURE — 99233 SBSQ HOSP IP/OBS HIGH 50: CPT

## 2022-08-04 PROCEDURE — 71045 X-RAY EXAM CHEST 1 VIEW: CPT | Mod: 26

## 2022-08-04 PROCEDURE — 93010 ELECTROCARDIOGRAM REPORT: CPT

## 2022-08-04 PROCEDURE — 95720 EEG PHY/QHP EA INCR W/VEEG: CPT

## 2022-08-04 PROCEDURE — 76700 US EXAM ABDOM COMPLETE: CPT | Mod: 26

## 2022-08-04 RX ORDER — SENNA PLUS 8.6 MG/1
2 TABLET ORAL AT BEDTIME
Refills: 0 | Status: DISCONTINUED | OUTPATIENT
Start: 2022-08-04 | End: 2022-08-10

## 2022-08-04 RX ORDER — MAGNESIUM SULFATE 500 MG/ML
2 VIAL (ML) INJECTION ONCE
Refills: 0 | Status: COMPLETED | OUTPATIENT
Start: 2022-08-04 | End: 2022-08-04

## 2022-08-04 RX ORDER — VANCOMYCIN HCL 1 G
500 VIAL (EA) INTRAVENOUS EVERY 8 HOURS
Refills: 0 | Status: DISCONTINUED | OUTPATIENT
Start: 2022-08-04 | End: 2022-08-04

## 2022-08-04 RX ORDER — GABAPENTIN 400 MG/1
200 CAPSULE ORAL AT BEDTIME
Refills: 0 | Status: DISCONTINUED | OUTPATIENT
Start: 2022-08-04 | End: 2022-08-10

## 2022-08-04 RX ORDER — METOPROLOL TARTRATE 50 MG
12.5 TABLET ORAL
Refills: 0 | Status: DISCONTINUED | OUTPATIENT
Start: 2022-08-04 | End: 2022-08-07

## 2022-08-04 RX ORDER — CHLORHEXIDINE GLUCONATE 213 G/1000ML
1 SOLUTION TOPICAL DAILY
Refills: 0 | Status: DISCONTINUED | OUTPATIENT
Start: 2022-08-04 | End: 2022-08-05

## 2022-08-04 RX ORDER — PANTOPRAZOLE SODIUM 20 MG/1
40 TABLET, DELAYED RELEASE ORAL EVERY 24 HOURS
Refills: 0 | Status: DISCONTINUED | OUTPATIENT
Start: 2022-08-04 | End: 2022-08-10

## 2022-08-04 RX ORDER — LANOLIN ALCOHOL/MO/W.PET/CERES
5 CREAM (GRAM) TOPICAL AT BEDTIME
Refills: 0 | Status: DISCONTINUED | OUTPATIENT
Start: 2022-08-04 | End: 2022-08-10

## 2022-08-04 RX ORDER — HEPARIN SODIUM 5000 [USP'U]/ML
800 INJECTION INTRAVENOUS; SUBCUTANEOUS
Qty: 25000 | Refills: 0 | Status: DISCONTINUED | OUTPATIENT
Start: 2022-08-04 | End: 2022-08-05

## 2022-08-04 RX ORDER — POLYETHYLENE GLYCOL 3350 17 G/17G
17 POWDER, FOR SOLUTION ORAL
Refills: 0 | Status: DISCONTINUED | OUTPATIENT
Start: 2022-08-04 | End: 2022-08-05

## 2022-08-04 RX ORDER — SODIUM CHLORIDE 9 MG/ML
4 INJECTION INTRAMUSCULAR; INTRAVENOUS; SUBCUTANEOUS EVERY 6 HOURS
Refills: 0 | Status: DISCONTINUED | OUTPATIENT
Start: 2022-08-04 | End: 2022-08-06

## 2022-08-04 RX ORDER — VANCOMYCIN HCL 1 G
VIAL (EA) INTRAVENOUS
Refills: 0 | Status: DISCONTINUED | OUTPATIENT
Start: 2022-08-04 | End: 2022-08-04

## 2022-08-04 RX ORDER — INSULIN LISPRO 100/ML
VIAL (ML) SUBCUTANEOUS EVERY 6 HOURS
Refills: 0 | Status: DISCONTINUED | OUTPATIENT
Start: 2022-08-04 | End: 2022-08-10

## 2022-08-04 RX ORDER — HEPARIN SODIUM 5000 [USP'U]/ML
750 INJECTION INTRAVENOUS; SUBCUTANEOUS
Qty: 25000 | Refills: 0 | Status: DISCONTINUED | OUTPATIENT
Start: 2022-08-04 | End: 2022-08-04

## 2022-08-04 RX ORDER — VANCOMYCIN HCL 1 G
500 VIAL (EA) INTRAVENOUS ONCE
Refills: 0 | Status: COMPLETED | OUTPATIENT
Start: 2022-08-04 | End: 2022-08-04

## 2022-08-04 RX ORDER — HEPARIN SODIUM 5000 [USP'U]/ML
700 INJECTION INTRAVENOUS; SUBCUTANEOUS
Qty: 25000 | Refills: 0 | Status: DISCONTINUED | OUTPATIENT
Start: 2022-08-04 | End: 2022-08-04

## 2022-08-04 RX ORDER — DEXAMETHASONE 0.5 MG/5ML
1 ELIXIR ORAL EVERY 12 HOURS
Refills: 0 | Status: DISCONTINUED | OUTPATIENT
Start: 2022-08-04 | End: 2022-08-09

## 2022-08-04 RX ORDER — INSULIN GLARGINE 100 [IU]/ML
10 INJECTION, SOLUTION SUBCUTANEOUS AT BEDTIME
Refills: 0 | Status: DISCONTINUED | OUTPATIENT
Start: 2022-08-04 | End: 2022-08-05

## 2022-08-04 RX ORDER — LOSARTAN POTASSIUM 100 MG/1
100 TABLET, FILM COATED ORAL DAILY
Refills: 0 | Status: DISCONTINUED | OUTPATIENT
Start: 2022-08-04 | End: 2022-08-08

## 2022-08-04 RX ORDER — ACETYLCYSTEINE 200 MG/ML
4 VIAL (ML) MISCELLANEOUS EVERY 6 HOURS
Refills: 0 | Status: DISCONTINUED | OUTPATIENT
Start: 2022-08-04 | End: 2022-08-06

## 2022-08-04 RX ORDER — ATORVASTATIN CALCIUM 80 MG/1
80 TABLET, FILM COATED ORAL AT BEDTIME
Refills: 0 | Status: DISCONTINUED | OUTPATIENT
Start: 2022-08-04 | End: 2022-08-10

## 2022-08-04 RX ADMIN — Medication 4 MILLILITER(S): at 17:16

## 2022-08-04 RX ADMIN — Medication 12.5 MILLIGRAM(S): at 17:34

## 2022-08-04 RX ADMIN — Medication 4 MILLILITER(S): at 21:29

## 2022-08-04 RX ADMIN — INSULIN GLARGINE 10 UNIT(S): 100 INJECTION, SOLUTION SUBCUTANEOUS at 22:30

## 2022-08-04 RX ADMIN — BRIVARACETAM 240 MILLIGRAM(S): 25 TABLET, FILM COATED ORAL at 05:52

## 2022-08-04 RX ADMIN — PIPERACILLIN AND TAZOBACTAM 25 GRAM(S): 4; .5 INJECTION, POWDER, LYOPHILIZED, FOR SOLUTION INTRAVENOUS at 09:53

## 2022-08-04 RX ADMIN — PANTOPRAZOLE SODIUM 40 MILLIGRAM(S): 20 TABLET, DELAYED RELEASE ORAL at 17:33

## 2022-08-04 RX ADMIN — Medication 100 MILLIGRAM(S): at 16:29

## 2022-08-04 RX ADMIN — Medication 2: at 12:34

## 2022-08-04 RX ADMIN — Medication 1 APPLICATION(S): at 17:33

## 2022-08-04 RX ADMIN — SODIUM CHLORIDE 4 MILLILITER(S): 9 INJECTION INTRAMUSCULAR; INTRAVENOUS; SUBCUTANEOUS at 17:17

## 2022-08-04 RX ADMIN — Medication 1 MILLIGRAM(S): at 17:33

## 2022-08-04 RX ADMIN — HEPARIN SODIUM 8 UNIT(S)/HR: 5000 INJECTION INTRAVENOUS; SUBCUTANEOUS at 22:17

## 2022-08-04 RX ADMIN — Medication 10 MILLIGRAM(S): at 09:53

## 2022-08-04 RX ADMIN — BRIVARACETAM 240 MILLIGRAM(S): 25 TABLET, FILM COATED ORAL at 17:34

## 2022-08-04 RX ADMIN — PIPERACILLIN AND TAZOBACTAM 25 GRAM(S): 4; .5 INJECTION, POWDER, LYOPHILIZED, FOR SOLUTION INTRAVENOUS at 00:26

## 2022-08-04 RX ADMIN — Medication 2 MILLIGRAM(S): at 05:51

## 2022-08-04 RX ADMIN — Medication 25 GRAM(S): at 05:51

## 2022-08-04 RX ADMIN — HEPARIN SODIUM 7.5 UNIT(S)/HR: 5000 INJECTION INTRAVENOUS; SUBCUTANEOUS at 08:47

## 2022-08-04 RX ADMIN — Medication 2: at 09:03

## 2022-08-04 RX ADMIN — ATORVASTATIN CALCIUM 80 MILLIGRAM(S): 80 TABLET, FILM COATED ORAL at 22:22

## 2022-08-04 RX ADMIN — SODIUM CHLORIDE 4 MILLILITER(S): 9 INJECTION INTRAMUSCULAR; INTRAVENOUS; SUBCUTANEOUS at 21:30

## 2022-08-04 RX ADMIN — Medication 8: at 22:31

## 2022-08-04 RX ADMIN — Medication 1 APPLICATION(S): at 06:41

## 2022-08-04 RX ADMIN — Medication 2: at 17:34

## 2022-08-04 RX ADMIN — PIPERACILLIN AND TAZOBACTAM 25 GRAM(S): 4; .5 INJECTION, POWDER, LYOPHILIZED, FOR SOLUTION INTRAVENOUS at 17:46

## 2022-08-04 RX ADMIN — Medication 100 MILLIGRAM(S): at 06:00

## 2022-08-04 RX ADMIN — GABAPENTIN 200 MILLIGRAM(S): 400 CAPSULE ORAL at 22:22

## 2022-08-04 NOTE — PROGRESS NOTE ADULT - PROBLEM SELECTOR PLAN 1
Will DC standing-dose Admelog for now.  Will decrease Lantus to 10u at bedtime and continue coverage scale. Will continue monitoring blood sugars and FU (Will start NPH once TF started..)  Discussed with son at bedside.

## 2022-08-04 NOTE — PROGRESS NOTE ADULT - ASSESSMENT
1) Lung and brain masses,  metastatic process.  Lung bx c/w metastatic NSCLC, adenocarcinoma  - pt s/p nsgy, resection of brain met  patient has also been seen by rad/Onc. Getting steroids and keppra.   Further med onc mgmt as outpt  pathology being sent for foundation testing.     2)Leukocytosis- likely reactive/steroid related    3) CAD- s/p MI. mgmt as per mrd, cv    4) PE- ac as per CCU team

## 2022-08-04 NOTE — PROGRESS NOTE ADULT - ASSESSMENT
Assessment  DMT2: 82y Female with DM T2 with hyperglycemia, A1C 8.4%, was on oral meds and insulin at home, now on basal bolus insulin, insulin doses held 2/2 NPO, blood sugars slightly elevated today, no hypoglycemias, dexamethasone dose decreased, planning to start NGT feeds.  Brain Mass: postop, on meds, monitored, FU Neurosurgery.  Lung Mass: s/p bronchoscopy, CA workup in progress, monitored.  Thyroid Nodules: seen on imaging, B/L subcentimeter thyroid nodules, she denies neck mass/pain/dysphagia, euthyroid, reports known history and is being followed by Dr. Richardson.  Adrenal Nodule: seen on imaging, 1.2 cm left thyroid nodule, unknown history. AM cortisol suppressed 2/2 current steroid use, aldosterone and metanephrine labs in normal range.        Troy Thibodeaux MD  [phone]

## 2022-08-04 NOTE — EEG REPORT - NS EEG TEXT BOX
LANIE BERTRAND [id number]     Study Date: 08-03-22 10 AM to 8/4/22 8AM  Duration (hrs): 22hrs     --------------------------------------------------------------------------------------------------  History:  CC/ HPI Patient is a 82y old  Female who presents with a chief complaint of abnormal imaging (04 Aug 2022 12:55)    MEDICATIONS  (STANDING):  acetylcysteine 10%  Inhalation 4 milliLiter(s) Inhalation every 6 hours  aspirin enteric coated 81 milliGRAM(s) Oral daily  atorvastatin 80 milliGRAM(s) Oral at bedtime  BACItracin   Ointment 1 Application(s) Topical two times a day  brivaracetam  IVPB 100 milliGRAM(s) IV Intermittent two times a day  dexAMETHasone  Injectable 1 milliGRAM(s) IV Push every 12 hours  gabapentin 200 milliGRAM(s) Oral at bedtime  heparin  Infusion 750 Unit(s)/Hr (7.5 mL/Hr) IV Continuous <Continuous>  insulin glargine Injectable (LANTUS) 10 Unit(s) SubCutaneous at bedtime  insulin lispro (ADMELOG) corrective regimen sliding scale   SubCutaneous Before meals and at bedtime  losartan 100 milliGRAM(s) Oral daily  metoprolol tartrate 12.5 milliGRAM(s) Oral two times a day  pantoprazole    Tablet 40 milliGRAM(s) Oral before breakfast  piperacillin/tazobactam IVPB.. 3.375 Gram(s) IV Intermittent every 8 hours  polyethylene glycol 3350 17 Gram(s) Oral two times a day  senna 2 Tablet(s) Oral at bedtime  sodium chloride 3%  Inhalation 4 milliLiter(s) Inhalation every 6 hours  vancomycin  IVPB      vancomycin  IVPB 500 milliGRAM(s) IV Intermittent every 8 hours    --------------------------------------------------------------------------------------------------  Study Interpretation:    [Abbreviation Key:  PDR=alpha rhythm/posterior dominant rhythm. A-P=anterior posterior.  Amplitude: ‘very low’:<20; ‘low’:20-49; ‘medium’:; ‘high’:>150uV.  Persistence for periodic/rhythmic patterns (% of epoch) ‘rare’:<1%; ‘occasional’:1-10%; ‘frequent’:10-50%; ‘abundant’:50-90%; ‘continuous’:>90%.  Persistence for sporadic discharges: ‘rare’:<1/hr; ‘occasional’:1/min-1/hr; ‘frequent’:>1/min; ‘abundant’:>1/10 sec.  RPP=rhythmic and periodic patterns; GRDA=generalized rhythmic delta activity; FIRDA=frontal intermittent GRDA; LRDA=lateralized rhythmic delta activity; TIRDA=temporal intermittent rhythmic delta activity;  LPD=PLED=lateralized periodic discharges; GPD=generalized periodic discharges; BIPDs =bilateral independent periodic discharges; Mf=multifocal; SIRPDs=stimulus induced rhythmic, periodic, or ictal appearing discharges; BIRDs=brief potentially ictal rhythmic discharges >4 Hz, lasting .5-10s; PFA (paroxysmal bursts >13 Hz or =8 Hz <10s).  Modifiers: +F=with fast component; +S=with spike component; +R=with rhythmic component.  S-B=burst suppression pattern.  Max=maximal. N1-drowsy; N2-stage II sleep; N3-slow wave sleep. SSS/BETS=small sharp spikes/benign epileptiform transients of sleep. HV=hyperventilation; PS=photic stimulation]    Daily EEG Visual Analysis  FINDINGS:      Background:  Continuous: continuous  Symmetry: asymmetric  PDR: not present   Reactivity: present  Voltage: low ( 20-49uV)  Anterior Posterior Gradient: present  Other background findings: none  Breach: absent    Background Slowing:  Generalized slowing: generalized delta slowing  Focal slowing: right temporal delta slowing     State Changes:   -N2 sleep transients were not recorded.    Sporadic Epileptiform Discharges:    Frequent sharp waves over the right midtemporal region     Rhythmic and Periodic Patterns (RPPs):  None     Electrographic and Electroclinical seizures:  None    Other Clinical Events:  None    Activation Procedures:   -Hyperventilation was not performed.    -Photic stimulation was not performed.     Artifacts:  Intermittent myogenic and movement artifacts were noted.    ECG:  The heart rate on single channel ECG was predominantly between 60-70 BPM.    EEG Classification / Summary:  Abnormal EEG study  Frequent sharp waves over the right midtemporal region   Focal right temporal delta slowing   Severe generalized background slowing    -----------------------------------------------------------------------------------------------------    Clinical Impression:  Risk of seizures from the right temporal region   Focal right temporal cerebral dysfunction   Severe diffuse/multifocal cerebral dysfunction, not specific as to etiology.    this is a preliminary report     Samy Lowery MD  Epilepsy Fellow , [facility]  Department of Neurology, [facility]    [facility] EEG Reading Room Ph#: [phone]  Epilepsy Answering Service after 5PM and before 8:30AM: Ph#: [phone]   LANIE BERTRAND [id number]     Study Date: 08-03-22 10 AM to 8/4/22 8AM  Duration (hrs): 22hrs   No Fp1/Fp2  --------------------------------------------------------------------------------------------------  History:  CC/ HPI Patient is a 82y old  Female who presents with a chief complaint of abnormal imaging (04 Aug 2022 12:55)    MEDICATIONS  (STANDING):  acetylcysteine 10%  Inhalation 4 milliLiter(s) Inhalation every 6 hours  aspirin enteric coated 81 milliGRAM(s) Oral daily  atorvastatin 80 milliGRAM(s) Oral at bedtime  BACItracin   Ointment 1 Application(s) Topical two times a day  brivaracetam  IVPB 100 milliGRAM(s) IV Intermittent two times a day  dexAMETHasone  Injectable 1 milliGRAM(s) IV Push every 12 hours  gabapentin 200 milliGRAM(s) Oral at bedtime  heparin  Infusion 750 Unit(s)/Hr (7.5 mL/Hr) IV Continuous <Continuous>  insulin glargine Injectable (LANTUS) 10 Unit(s) SubCutaneous at bedtime  insulin lispro (ADMELOG) corrective regimen sliding scale   SubCutaneous Before meals and at bedtime  losartan 100 milliGRAM(s) Oral daily  metoprolol tartrate 12.5 milliGRAM(s) Oral two times a day  pantoprazole    Tablet 40 milliGRAM(s) Oral before breakfast  piperacillin/tazobactam IVPB.. 3.375 Gram(s) IV Intermittent every 8 hours  polyethylene glycol 3350 17 Gram(s) Oral two times a day  senna 2 Tablet(s) Oral at bedtime  sodium chloride 3%  Inhalation 4 milliLiter(s) Inhalation every 6 hours  vancomycin  IVPB      vancomycin  IVPB 500 milliGRAM(s) IV Intermittent every 8 hours    --------------------------------------------------------------------------------------------------  Study Interpretation:    [Abbreviation Key:  PDR=alpha rhythm/posterior dominant rhythm. A-P=anterior posterior.  Amplitude: ‘very low’:<20; ‘low’:20-49; ‘medium’:; ‘high’:>150uV.  Persistence for periodic/rhythmic patterns (% of epoch) ‘rare’:<1%; ‘occasional’:1-10%; ‘frequent’:10-50%; ‘abundant’:50-90%; ‘continuous’:>90%.  Persistence for sporadic discharges: ‘rare’:<1/hr; ‘occasional’:1/min-1/hr; ‘frequent’:>1/min; ‘abundant’:>1/10 sec.  RPP=rhythmic and periodic patterns; GRDA=generalized rhythmic delta activity; FIRDA=frontal intermittent GRDA; LRDA=lateralized rhythmic delta activity; TIRDA=temporal intermittent rhythmic delta activity;  LPD=PLED=lateralized periodic discharges; GPD=generalized periodic discharges; BIPDs =bilateral independent periodic discharges; Mf=multifocal; SIRPDs=stimulus induced rhythmic, periodic, or ictal appearing discharges; BIRDs=brief potentially ictal rhythmic discharges >4 Hz, lasting .5-10s; PFA (paroxysmal bursts >13 Hz or =8 Hz <10s).  Modifiers: +F=with fast component; +S=with spike component; +R=with rhythmic component.  S-B=burst suppression pattern.  Max=maximal. N1-drowsy; N2-stage II sleep; N3-slow wave sleep. SSS/BETS=small sharp spikes/benign epileptiform transients of sleep. HV=hyperventilation; PS=photic stimulation]    Daily EEG Visual Analysis  FINDINGS:      Background:  Continuous: continuous  Symmetry: asymmetric  PDR: not present   Reactivity: present  Voltage: low ( 20-49uV)  Anterior Posterior Gradient: present  Other background findings: none  Breach: absent    Background Slowing:  Generalized slowing: generalized delta slowing  Focal slowing: right temporal delta slowing     State Changes:   -N2 sleep transients were not recorded.    Sporadic Epileptiform Discharges:    Frequent sharp waves over the right midtemporal region     Rhythmic and Periodic Patterns (RPPs):  None     Electrographic and Electroclinical seizures:  None    Other Clinical Events:  None    Activation Procedures:   -Hyperventilation was not performed.    -Photic stimulation was not performed.     Artifacts:  Intermittent myogenic and movement artifacts were noted.    ECG:  The heart rate on single channel ECG was predominantly between 60-70 BPM.    EEG Classification / Summary:  Abnormal EEG study  Frequent sharp waves over the right midtemporal region   Focal right temporal delta slowing   Severe generalized background slowing    -----------------------------------------------------------------------------------------------------    Clinical Impression:  Risk of seizures from the right temporal region   Focal right temporal cerebral dysfunction   Severe diffuse/multifocal cerebral dysfunction, not specific as to etiology.    this is a preliminary report     Samy Lowery MD  Epilepsy Fellow , [facility]  Department of Neurology, [facility]    [facility] EEG Reading Room Ph#: [phone]  Epilepsy Answering Service after 5PM and before 8:30AM: Ph#: [phone]   LANIE BERTRAND [id number]     Study Date: 08-03-22 10 AM to 8/4/22 8AM  Duration (hrs): 22hrs   No Fp1/Fp2  --------------------------------------------------------------------------------------------------  History:  CC/ HPI Patient is a 82y old  Female who presents with a chief complaint of abnormal imaging (04 Aug 2022 12:55)    MEDICATIONS  (STANDING):  acetylcysteine 10%  Inhalation 4 milliLiter(s) Inhalation every 6 hours  aspirin enteric coated 81 milliGRAM(s) Oral daily  atorvastatin 80 milliGRAM(s) Oral at bedtime  BACItracin   Ointment 1 Application(s) Topical two times a day  brivaracetam  IVPB 100 milliGRAM(s) IV Intermittent two times a day  dexAMETHasone  Injectable 1 milliGRAM(s) IV Push every 12 hours  gabapentin 200 milliGRAM(s) Oral at bedtime  heparin  Infusion 750 Unit(s)/Hr (7.5 mL/Hr) IV Continuous <Continuous>  insulin glargine Injectable (LANTUS) 10 Unit(s) SubCutaneous at bedtime  insulin lispro (ADMELOG) corrective regimen sliding scale   SubCutaneous Before meals and at bedtime  losartan 100 milliGRAM(s) Oral daily  metoprolol tartrate 12.5 milliGRAM(s) Oral two times a day  pantoprazole    Tablet 40 milliGRAM(s) Oral before breakfast  piperacillin/tazobactam IVPB.. 3.375 Gram(s) IV Intermittent every 8 hours  polyethylene glycol 3350 17 Gram(s) Oral two times a day  senna 2 Tablet(s) Oral at bedtime  sodium chloride 3%  Inhalation 4 milliLiter(s) Inhalation every 6 hours  vancomycin  IVPB      vancomycin  IVPB 500 milliGRAM(s) IV Intermittent every 8 hours    --------------------------------------------------------------------------------------------------  Study Interpretation:    [Abbreviation Key:  PDR=alpha rhythm/posterior dominant rhythm. A-P=anterior posterior.  Amplitude: ‘very low’:<20; ‘low’:20-49; ‘medium’:; ‘high’:>150uV.  Persistence for periodic/rhythmic patterns (% of epoch) ‘rare’:<1%; ‘occasional’:1-10%; ‘frequent’:10-50%; ‘abundant’:50-90%; ‘continuous’:>90%.  Persistence for sporadic discharges: ‘rare’:<1/hr; ‘occasional’:1/min-1/hr; ‘frequent’:>1/min; ‘abundant’:>1/10 sec.  RPP=rhythmic and periodic patterns; GRDA=generalized rhythmic delta activity; FIRDA=frontal intermittent GRDA; LRDA=lateralized rhythmic delta activity; TIRDA=temporal intermittent rhythmic delta activity;  LPD=PLED=lateralized periodic discharges; GPD=generalized periodic discharges; BIPDs =bilateral independent periodic discharges; Mf=multifocal; SIRPDs=stimulus induced rhythmic, periodic, or ictal appearing discharges; BIRDs=brief potentially ictal rhythmic discharges >4 Hz, lasting .5-10s; PFA (paroxysmal bursts >13 Hz or =8 Hz <10s).  Modifiers: +F=with fast component; +S=with spike component; +R=with rhythmic component.  S-B=burst suppression pattern.  Max=maximal. N1-drowsy; N2-stage II sleep; N3-slow wave sleep. SSS/BETS=small sharp spikes/benign epileptiform transients of sleep. HV=hyperventilation; PS=photic stimulation]    Daily EEG Visual Analysis  FINDINGS:      Background:  Continuous: continuous  Symmetry: asymmetric  PDR: not present   Reactivity: present  Voltage: low ( 20-49uV)  Anterior Posterior Gradient: present  Other background findings: none  Breach: absent    Background Slowing:  Generalized slowing: generalized delta slowing  Focal slowing: right temporal delta slowing     State Changes:   -N2 sleep transients were not recorded.    Sporadic Epileptiform Discharges:    Frequent sharp waves over the right midtemporal region     Rhythmic and Periodic Patterns (RPPs):  None     Electrographic and Electroclinical seizures:  None    Other Clinical Events:  None    Activation Procedures:   -Hyperventilation was not performed.    -Photic stimulation was not performed.     Artifacts:  Intermittent myogenic and movement artifacts were noted.    ECG:  The heart rate on single channel ECG was predominantly between 60-70 BPM.    EEG Classification / Summary:  Abnormal EEG study  Frequent sharp waves over the right midtemporal region   Focal right temporal delta slowing   Moderate to severe generalized background slowing    -----------------------------------------------------------------------------------------------------    Clinical Impression:  Risk of seizures from the right temporal region   Focal right temporal cerebral dysfunction   Moderate to severe diffuse/multifocal cerebral dysfunction, not specific as to etiology.      Samy Lowery MD  Epilepsy Fellow , [facility]  Department of Neurology, [facility] [employer] [facility] [employer] [facility]    [facility] EEG Reading Room Ph#: [phone]  Epilepsy Answering Service after 5PM and before 8:30AM: Ph#: [phone]

## 2022-08-04 NOTE — PROGRESS NOTE ADULT - SUBJECTIVE AND OBJECTIVE BOX
is an 81 y/o female (former smoker) with newly diagnosed NSCLC favoring adenocarcinoma, with brain mets s/p craniotomy of right temporal lesion 7/27/22, who presented to the ED for imaging for her head due to abnormal spine MRI by her orthopedist (Dr. Castro) during workup for her spine for her chronic back problems, some difficulty walking with intracranial abnormality noted on imaging.  She was seen for consult prior. She had appreciated a gait/balance abnormality present since 4/2022.     Once stable enough to be discharged, plan is for outpatient adjuvant RT with Dr. Funes.  Her son was given our contact information.  [facility]: [address] [place] [address]  Radiation Oncology, main level  Dr. Funes.    Call: [phone] or email [email or web address]  to obtain consult date/time during discharge planning.

## 2022-08-04 NOTE — PROGRESS NOTE ADULT - SUBJECTIVE AND OBJECTIVE BOX
DATE OF SERVICE: 22 @ 15:05    Patient is a 82y old  Female who presents with a chief complaint of abnormal imaging (04 Aug 2022 12:55)      SUBJECTIVE / OVERNIGHT EVENTS:  No chest pain. No shortness of breath. No complaints. No events overnight. more alert and verbal    MEDICATIONS  (STANDING):  acetylcysteine 10%  Inhalation 4 milliLiter(s) Inhalation every 6 hours  aspirin enteric coated 81 milliGRAM(s) Oral daily  atorvastatin 80 milliGRAM(s) Oral at bedtime  BACItracin   Ointment 1 Application(s) Topical two times a day  brivaracetam  IVPB 100 milliGRAM(s) IV Intermittent two times a day  dexAMETHasone  Injectable 1 milliGRAM(s) IV Push every 12 hours  gabapentin 200 milliGRAM(s) Oral at bedtime  heparin  Infusion 750 Unit(s)/Hr (7.5 mL/Hr) IV Continuous <Continuous>  insulin glargine Injectable (LANTUS) 10 Unit(s) SubCutaneous at bedtime  insulin lispro (ADMELOG) corrective regimen sliding scale   SubCutaneous Before meals and at bedtime  losartan 100 milliGRAM(s) Oral daily  metoprolol tartrate 12.5 milliGRAM(s) Oral two times a day  pantoprazole    Tablet 40 milliGRAM(s) Oral before breakfast  pantoprazole  Injectable 40 milliGRAM(s) IV Push every 24 hours  piperacillin/tazobactam IVPB.. 3.375 Gram(s) IV Intermittent every 8 hours  polyethylene glycol 3350 17 Gram(s) Oral two times a day  senna 2 Tablet(s) Oral at bedtime  sodium chloride 3%  Inhalation 4 milliLiter(s) Inhalation every 6 hours  vancomycin  IVPB      vancomycin  IVPB 500 milliGRAM(s) IV Intermittent every 8 hours    MEDICATIONS  (PRN):  melatonin 5 milliGRAM(s) Oral at bedtime PRN Sleep      Vital Signs Last 24 Hrs  T(C): 36.6 (04 Aug 2022 12:00), Max: 36.8 (03 Aug 2022 20:00)  T(F): 97.9 (04 Aug 2022 12:00), Max: 98.2 (03 Aug 2022 20:00)  HR: 56 (04 Aug 2022 14:00) (56 - 120)  BP: 174/69 (04 Aug 2022 14:00) (66/44 - 174/69)  BP(mean): 99 (04 Aug 2022 14:00) (51 - 99)  RR: 20 (04 Aug 2022 14:00) (18 - 38)  SpO2: 100% (04 Aug 2022 14:00) (96% - 100%)    Parameters below as of 04 Aug 2022 08:00  Patient On (Oxygen Delivery Method): nasal cannula  O2 Flow (L/min): 2    CAPILLARY BLOOD GLUCOSE      POCT Blood Glucose.: 197 mg/dL (04 Aug 2022 12:31)  POCT Blood Glucose.: 198 mg/dL (04 Aug 2022 08:49)  POCT Blood Glucose.: 111 mg/dL (03 Aug 2022 22:24)  POCT Blood Glucose.: 189 mg/dL (03 Aug 2022 17:46)    I&O's Summary    03 Aug 2022 07:01  -  04 Aug 2022 07:00  --------------------------------------------------------  IN: 835 mL / OUT: 2600 mL / NET: -1765 mL        PHYSICAL EXAM:  GENERAL: NAD, well-developed  HEAD:  Atraumatic, Normocephalic  EYES: EOMI, PERRLA, conjunctiva and sclera clear  NECK: Supple, No JVD  CHEST/LUNG: Clear to auscultation bilaterally; No wheeze  HEART: Regular rate and rhythm; No murmurs, rubs, or gallops  ABDOMEN: Soft, Nontender, Nondistended; Bowel sounds present  EXTREMITIES:  2+ Peripheral Pulses, No clubbing, cyanosis, or edema  PSYCH: AAOx3  NEUROLOGY: non-focal  SKIN: No rashes or lesions    LABS:                        11.5   20.39 )-----------( 182      ( 04 Aug 2022 04:20 )             34.9     08-04    137  |  100  |  41<H>  ----------------------------<  264<H>  4.3   |  24  |  1.06    Ca    8.2<L>      04 Aug 2022 04:20  Phos  5.3     08-04  Mg     1.8     08-04    TPro  5.0<L>  /  Alb  2.2<L>  /  TBili  0.4  /  DBili  x   /  AST  48<H>  /  ALT  17  /  AlkPhos  57  08-04    PT/INR - ( 04 Aug 2022 04:20 )   PT: 18.3 sec;   INR: 1.57 ratio         PTT - ( 04 Aug 2022 06:10 )  PTT:54.5 sec  CARDIAC MARKERS ( 04 Aug 2022 04:20 )  x     / x     / 265 U/L / x     / 18.9 ng/mL  CARDIAC MARKERS ( 03 Aug 2022 08:57 )  x     / x     / 326 U/L / x     / 29.3 ng/mL  CARDIAC MARKERS ( 03 Aug 2022 07:45 )  x     / x     / 355 U/L / x     / 32.0 ng/mL  CARDIAC MARKERS ( 02 Aug 2022 17:12 )  x     / x     / 466 U/L / x     / 63.2 ng/mL      Urinalysis Basic - ( 03 Aug 2022 08:40 )    Color: Light Yellow / Appearance: Clear / S.015 / pH: x  Gluc: x / Ketone: Trace  / Bili: Negative / Urobili: Negative   Blood: x / Protein: Negative / Nitrite: Negative   Leuk Esterase: Large / RBC: 1 /hpf / WBC 51 /HPF   Sq Epi: x / Non Sq Epi: 0 /hpf / Bacteria: Many    < from: CT Angio Chest PE Protocol w/ IV Cont (22 @ 09:59) >  INTERPRETATION:  CLINICAL INFORMATION: Patient with metastatic brain   lesion status post craniotomy, metastatic lung cancer status post   bronchoscopy and biopsy indicating poorly differentiated adenocarcinoma   subsequent STEMI now with altered mental status and unresponsiveness.   Evaluate for PE    COMPARISON: CT chest from 2022    CONTRAST/COMPLICATIONS:  IV Contrast: Omnipaque 350  90 cc administered   0 cc discarded  Oral Contrast: NONE  Complications: None reported at time of study completion    PROCEDURE:  CT Angiogram of the chest was obtained with intravenous contrast. Three   dimensional maximum intensity projection (MIP) images were generated.    FINDINGS:    PULMONARY ANGIOGRAM: Pulmonary embolism in the lateral segmental branch   of the right middle lobe without evidence of right heart strain. Multiple   segmental branches in the left lung are obscured by motion.    LYMPH NODES: Stable 1.2 cm left hilar lymph node.    HEART/VASCULATURE: The heart is normal in size. No pericardial effusion.   There are aortic, aortic valve, and coronary artery calcifications.    AIRWAYS/LUNGS/PLEURA: Patent central airways. Left upper lobe mass   measuring 2.7 x 2.1 cm, series 5 image 50, decreased in size from prior,   and unchanged obstruction left apical posterior bronchus, and associated   segmental atelectasis along the mediastinum. No pleural effusion or   pneumothorax.    UPPER ABDOMEN: Within normal limits.    BONES/SOFT TISSUES: Stable multinodular thyroid. Metallic hardware in the   right humeral head and shaft. Degenerative changes of the spine.    IMPRESSION:  Pulmonary embolism in the lateral segmental branch of the right middle   lobe. No evidence of right heart strain.    Interval decrease in left upper lobe mass. Unchanged obstruction of the   posterior bronchus, and stable fibroglandular lymph node.    COMMUNICATION:  Discussed positive PE findings with [facility] DAVID Poon by Dr. Joycelyn Rebollar at 12:15 PM on 8/3/2022.    --- End of Report ---    < end of copied text >      RADIOLOGY & ADDITIONAL TESTS:    Imaging Personally Reviewed:    Consultant(s) Notes Reviewed:      Care Discussed with Consultants/Other Providers:

## 2022-08-04 NOTE — PROGRESS NOTE ADULT - ASSESSMENT
Patient is an 82 year old female that presented to ED with abnormal MRI reading (discovered by patient's orthopedic), also with gait/balance issues since April, found to have R temporal brain mass c/f metastatic brain lesion now s/p craniotomy 7/27. While pending surgery, AC held, and on 8/1 presented with CP found to have STEMI on EKG. ASA loaded and started on hep gtt 8/1 and transferred to CCU for further management.    ====================  NEUROLOGY    #AMS  - patient increasingly somnolent 8/2 AM, follows commands intermittently  - responds briefly to sternal rub and painful stimuli   - Repeat CTH x5 unchanged from prior  - UA+, started on CTX  > Will ctm, NSGY aware    #Brain lesion  - s/p craniotomy for resection of brain mass 7/27, path showing NSC carcinoma (favor adeno)  - c/w depakote  - c/w dexamethasone  - repeat CTH 1 hour post AC resumption unremarkable  - goal -160     #Peripheral neuropathy  - c/w gabapentin    ====================  CARDIOVASCULAR    #CAD s/p PCI  #STEMI  - TTE 7/22 with normal LV internal dimensions with discrete upper septal hypertrophy. EF 55%, inferolateral wall is akinetic.  - c/w lopressor 25 bid  - c/w atorva 40  - EKG 8/1 with STEMI in multiple leads, r/p EKG more pronounced STEMI   - too high risk for cath given recent craniotomy  - s/p ASA load, starting heparin gtt  - serial CE    PE  -  on iv heparin    #HTN  - c/w losartan 100 qD, amlo 5    ====================  RESPIRATORY    #Metastatic lung cancer  - s/p bronchoscopy - 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  - no indication for pulmonary medications or antibiotics  - Pulm following, appreciate recs    - Pt tachypneic, borderline HoTN 8/2 c/f PE, CTA chest obtained to r/o PE  - F/u CTA chest  - satting well on RA  - incentive mario    ====================  GI    - CC Diet  - pantoprazole 40 qD  - senna, dulcolax    ====================  ENDOCRINE    #DM  - c/w Lantus 15 qhs, Admelog 7 before breakfast and dinner, Admelog 5 before lunch  - moderate dose correctional scale  - Endo following, appreciate recs     ====================  RENAL    - Retaining - straight cath prn  - Monitor UOP, lytes    ====================  HEMATOLOGY/ONCOLOGY    #Leukocytosis  - iso dexamethasone, however now with +UA    #Metastatic lung cancer  - Onc following, recs appreciated  - Path +NSCC    #DVT PPx  - c/w ASA, heparin gtt    ====================  INFECTIOUS DISEASES    - on abx for poss uti

## 2022-08-04 NOTE — PROGRESS NOTE ADULT - SUBJECTIVE AND OBJECTIVE BOX
CARDIOLOGY FOLLOW UP - Dr. Alcaraz  DATE OF SERVICE: 8/4/22     CC pt is more awake today   family at bedside  events noted + acute PE   -PAFIB yesterday         REVIEW OF SYSTEMS:  CONSTITUTIONAL: No fever, weight loss, or fatigue  RESPIRATORY: No cough, wheezing, chills or hemoptysis; No Shortness of Breath  CARDIOVASCULAR: No chest pain, palpitations, passing out, dizziness, or leg swelling  GASTROINTESTINAL: No abdominal or epigastric pain. No nausea, vomiting, or hematemesis; No diarrhea or constipation. No melena or hematochezia.  VASCULAR: No edema     PHYSICAL EXAM:  T(C): 36.3 (08-04-22 @ 04:00), Max: 36.8 (08-03-22 @ 20:00)  HR: 63 (08-04-22 @ 12:00) (63 - 120)  BP: 102/56 (08-04-22 @ 12:00) (66/44 - 146/63)  RR: 25 (08-04-22 @ 12:00) (18 - 38)  SpO2: 97% (08-03-22 @ 18:00) (96% - 97%)  Wt(kg): --  I&O's Summary    03 Aug 2022 07:01  -  04 Aug 2022 07:00  --------------------------------------------------------  IN: 835 mL / OUT: 2600 mL / NET: -1765 mL        Appearance: Normal	  Cardiovascular: Normal S1 S2,RRR  Respiratory: Lungs clear to auscultation	  Gastrointestinal:  Soft, Non-tender, + BS	  Extremities: Normal range of motion, No clubbing, cyanosis or edema      Home Medications:  acetaminophen 325 mg oral tablet: 2 tab(s) orally every 6 hours, As needed, Temp greater or equal to 38C (100.4F), Mild Pain (1 - 3) (01 Aug 2022 13:52)  amLODIPine 5 mg oral tablet: 1 tab(s) orally once a day (01 Aug 2022 13:52)  atorvastatin 40 mg oral tablet: 1 tab(s) orally once a day (at bedtime) (01 Aug 2022 13:52)  bisacodyl 5 mg oral delayed release tablet: 1 tab(s) orally once a day, As needed, Constipation (01 Aug 2022 13:52)  dexamethasone 2 mg oral tablet: 1 tab po every 8 hours for 2 days, then 1 tab po q12 hours x2 days, then 1 tab po daily x3 days, then stop (01 Aug 2022 13:52)  divalproex sodium 500 mg oral delayed release tablet: 1 tab(s) orally every 8 hours (01 Aug 2022 13:52)  enoxaparin: 40 milligram(s) subcutaneous once a day (at bedtime) (01 Aug 2022 13:52)  gabapentin 100 mg oral capsule: 2 cap(s) orally once a day (at bedtime) (01 Aug 2022 13:52)  insulin glargine 100 units/mL subcutaneous solution: 15 unit(s) subcutaneous once a day (at bedtime) (01 Aug 2022 13:52)  insulin lispro 100 units/mL injectable solution: 5 unit(s) subcutaneous once a day before lunch (01 Aug 2022 13:52)  insulin lispro 100 units/mL injectable solution: 7 unit(s) subcutaneous once a day before breakfast (01 Aug 2022 13:52)  insulin lispro 100 units/mL injectable solution: 7 unit(s) subcutaneous once a day before dinner (01 Aug 2022 13:52)  insulin lispro 100 units/mL injectable solution: moderate dose sliding scale as per hospital/facility protocol, 3 times a day (before meals) (01 Aug 2022 13:52)  losartan 100 mg oral tablet: 1 tab(s) orally once a day (01 Aug 2022 13:52)  melatonin 5 mg oral tablet: 1 tab(s) orally once a day (at bedtime), As needed, Sleep (01 Aug 2022 13:52)  metoprolol tartrate 25 mg oral tablet: 1 tab(s) orally 2 times a day (01 Aug 2022 13:52)  oxyCODONE 10 mg oral tablet: 1 tab(s) orally every 4 hours, As needed, Severe Pain (7 - 10) (01 Aug 2022 13:52)  oxyCODONE 5 mg oral tablet: 1 tab(s) orally every 4 hours, As needed, Moderate Pain (4 - 6) (01 Aug 2022 13:52)  pantoprazole 40 mg oral delayed release tablet: 1 tab(s) orally once a day (before a meal) (01 Aug 2022 13:52)  polyethylene glycol 3350 oral powder for reconstitution: 17 gram(s) orally 2 times a day (01 Aug 2022 13:52)  senna leaf extract oral tablet: 2 tab(s) orally once a day (at bedtime) (01 Aug 2022 13:52)      MEDICATIONS  (STANDING):  acetylcysteine 10%  Inhalation 4 milliLiter(s) Inhalation every 6 hours  aspirin enteric coated 81 milliGRAM(s) Oral daily  atorvastatin 80 milliGRAM(s) Oral at bedtime  BACItracin   Ointment 1 Application(s) Topical two times a day  brivaracetam  IVPB 100 milliGRAM(s) IV Intermittent two times a day  dexAMETHasone  Injectable 1 milliGRAM(s) IV Push every 12 hours  gabapentin 200 milliGRAM(s) Oral at bedtime  heparin  Infusion 750 Unit(s)/Hr (7.5 mL/Hr) IV Continuous <Continuous>  insulin glargine Injectable (LANTUS) 20 Unit(s) SubCutaneous at bedtime  insulin lispro (ADMELOG) corrective regimen sliding scale   SubCutaneous Before meals and at bedtime  insulin lispro Injectable (ADMELOG) 5 Unit(s) SubCutaneous before lunch  insulin lispro Injectable (ADMELOG) 7 Unit(s) SubCutaneous before breakfast  insulin lispro Injectable (ADMELOG) 7 Unit(s) SubCutaneous before dinner  losartan 100 milliGRAM(s) Oral daily  metoprolol tartrate 12.5 milliGRAM(s) Oral two times a day  pantoprazole    Tablet 40 milliGRAM(s) Oral before breakfast  piperacillin/tazobactam IVPB.. 3.375 Gram(s) IV Intermittent every 8 hours  polyethylene glycol 3350 17 Gram(s) Oral two times a day  senna 2 Tablet(s) Oral at bedtime  sodium chloride 3%  Inhalation 4 milliLiter(s) Inhalation every 6 hours  vancomycin  IVPB      vancomycin  IVPB 500 milliGRAM(s) IV Intermittent every 8 hours      TELEMETRY: nsr  	    ECG:  	  RADIOLOGY:     < from: CT Angio Chest PE Protocol w/ IV Cont (08.03.22 @ 09:59) >    IMPRESSION:  Pulmonary embolism in the lateral segmental branch of the right middle   lobe. No evidence of right heart strain.    Interval decrease in left upper lobe mass. Unchanged obstruction of the   posterior bronchus, and stable fibroglandular lymph node.    COMMUNICATION:  Discussed positive PE findings with [facility] DAVID Poon by Dr. Joycelyn Rebollar at 12:15 PM on 8/3/2022.    --- End of Report ---        < end of copied text >    DIAGNOSTIC TESTING:  [ ] Echocardiogram:  [ ]  Catheterization:  [ ] Stress Test:    OTHER: 	    LABS:	 	    Troponin T, High Sensitivity Result: 1971 ng/L [0 - 51] (08-04 @ 04:20)  Creatine Kinase, Serum: 265 U/L [25 - 170] (08-04 @ 04:20)  CKMB Units: 18.9 ng/mL [0.0 - 3.8] (08-04 @ 04:20)  Creatine Kinase, Serum: 326 U/L [25 - 170] (08-03 @ 08:57)  CKMB Units: 29.3 ng/mL [0.0 - 3.8] (08-03 @ 08:57)  Troponin T, High Sensitivity Result: 1121 ng/L [0 - 51] (08-03 @ 08:57)  Creatine Kinase, Serum: 355 U/L [25 - 170] (08-03 @ 07:45)  CKMB Units: 32.0 ng/mL [0.0 - 3.8] (08-03 @ 07:45)  Troponin T, High Sensitivity Result: 1120 ng/L [0 - 51] (08-03 @ 07:45)  CKMB Units: 63.2 ng/mL [0.0 - 3.8] (08-02 @ 17:12)  Creatine Kinase, Serum: 466 U/L [25 - 170] (08-02 @ 17:12)  Troponin T, High Sensitivity Result: 948 ng/L [0 - 51] (08-02 @ 17:12)  Creatine Kinase, Serum: 470 U/L [25 - 170] (08-02 @ 11:09)  CKMB Units: 72.7 ng/mL [0.0 - 3.8] (08-02 @ 11:09)  Troponin T, High Sensitivity Result: 961 ng/L [0 - 51] (08-02 @ 11:09)  CKMB Units: 57.1 ng/mL [0.0 - 3.8] (08-02 @ 04:07)  Creatine Kinase, Serum: 386 U/L [25 - 170] (08-02 @ 04:07)  Troponin T, High Sensitivity Result: 622 ng/L [0 - 51] (08-02 @ 04:07)  CKMB Units: 19.7 ng/mL [0.0 - 3.8] (08-01 @ 20:29)  Creatine Kinase, Serum: 171 U/L [25 - 170] (08-01 @ 20:29)  Troponin T, High Sensitivity Result: 285 ng/L [0 - 51] (08-01 @ 20:29)  Troponin T, High Sensitivity Result: 22 ng/L [0 - 51] (08-01 @ 15:26)  CKMB Units: 2.0 ng/mL [0.0 - 3.8] (08-01 @ 15:26)  Creatine Kinase, Serum: 78 U/L [25 - 170] (08-01 @ 15:26)                          11.5   20.39 )-----------( 182      ( 04 Aug 2022 04:20 )             34.9     08-04    137  |  100  |  41<H>  ----------------------------<  264<H>  4.3   |  24  |  1.06    Ca    8.2<L>      04 Aug 2022 04:20  Phos  5.3     08-04  Mg     1.8     08-04    TPro  5.0<L>  /  Alb  2.2<L>  /  TBili  0.4  /  DBili  x   /  AST  48<H>  /  ALT  17  /  AlkPhos  57  08-04    PT/INR - ( 04 Aug 2022 04:20 )   PT: 18.3 sec;   INR: 1.57 ratio         PTT - ( 04 Aug 2022 06:10 )  PTT:54.5 sec

## 2022-08-04 NOTE — PROGRESS NOTE ADULT - SUBJECTIVE AND OBJECTIVE BOX
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; pulmonary embolism; STEMI; lethargy    INTERVAL HISTORY: much more awake and alert -> A & O x 3 -> very slow speech; remains in the CCU -> EKG c/w STEMI - cardiac enzymes are elevated -> catheterization not performed due to recent neurosurgery -> continues on ASA and heparin gtt without a change in brain CT -> troponin level continues to increase; no shortness of breath or hypoxemia on a 2lpm nasal canula; no cough, sputum production, hemoptysis, chest congestion or wheeze; no fevers, chills or sweats; no chest pain/pressure or palpitations; s/p bronchoscopy with endobronchial brushings/biopsies of an obstructing left upper lobe endobronchial lesion -> 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;     REVIEW OF SYSTEMS:  Constitutional: As per interval history  HEENT: Within normal limits  CV: As per interval history  Resp: As per interval history  GI: Within normal limits   : Within normal limits  Musculoskeletal: Within normal limits  Skin: Within normal limits  Neurological: lethargy  Psychiatric: Within normal limits  Endocrine: Within normal limits  Hematologic/Lymphatic: Within normal limits  Allergic/Immunologic: Within normal limits      MEDICATIONS:     Pulmonary "  acetylcysteine 10%  Inhalation 4 milliLiter(s) Inhalation every 6 hours  sodium chloride 3%  Inhalation 4 milliLiter(s) Inhalation every 6 hours    Anti-microbials:  piperacillin/tazobactam IVPB.. 3.375 Gram(s) IV Intermittent every 8 hours  vancomycin  IVPB      vancomycin  IVPB 500 milliGRAM(s) IV Intermittent every 8 hours    Cardiovascular:  losartan 100 milliGRAM(s) Oral daily  metoprolol tartrate 12.5 milliGRAM(s) Oral two times a day  norepinephrine Infusion 0.05 MICROgram(s)/kG/Min IV Continuous <Continuous>    Other:  aspirin enteric coated 81 milliGRAM(s) Oral daily  atorvastatin 80 milliGRAM(s) Oral at bedtime  BACItracin   Ointment 1 Application(s) Topical two times a day  bisacodyl Suppository 10 milliGRAM(s) Rectal once  brivaracetam  IVPB 100 milliGRAM(s) IV Intermittent two times a day  dexAMETHasone  Injectable 1 milliGRAM(s) IV Push every 12 hours  gabapentin 200 milliGRAM(s) Oral at bedtime  heparin  Infusion 750 Unit(s)/Hr IV Continuous <Continuous>  insulin glargine Injectable (LANTUS) 20 Unit(s) SubCutaneous at bedtime  insulin lispro (ADMELOG) corrective regimen sliding scale   SubCutaneous Before meals and at bedtime  insulin lispro Injectable (ADMELOG) 5 Unit(s) SubCutaneous before lunch  insulin lispro Injectable (ADMELOG) 7 Unit(s) SubCutaneous before breakfast  insulin lispro Injectable (ADMELOG) 7 Unit(s) SubCutaneous before dinner  pantoprazole    Tablet 40 milliGRAM(s) Oral before breakfast  polyethylene glycol 3350 17 Gram(s) Oral two times a day  senna 2 Tablet(s) Oral at bedtime    MEDICATIONS  (PRN):  melatonin 5 milliGRAM(s) Oral at bedtime PRN Sleep    OBJECTIVE:    Daily Weight in k.5 (04 Aug 2022 06:00)    POCT Blood Glucose.: 198 mg/dL (04 Aug 2022 08:49)  POCT Blood Glucose.: 111 mg/dL (03 Aug 2022 22:24)  POCT Blood Glucose.: 189 mg/dL (03 Aug 2022 17:46)  POCT Blood Glucose.: 173 mg/dL (03 Aug 2022 13:45)      PHYSICAL EXAM:       ICU Vital Signs Last 24 Hrs  T(C): 36.3 (04 Aug 2022 04:00), Max: 36.8 (03 Aug 2022 12:00)  T(F): 97.4 (04 Aug 2022 04:00), Max: 98.3 (03 Aug 2022 12:00)  HR: 69 (04 Aug 2022 06:00) (64 - 120)  BP: 120/58 (04 Aug 2022 06:00) (66/44 - 137/62)  BP(mean): 72 (04 Aug 2022 05:00) (51 - 91)  ABP: --  ABP(mean): --  RR: 20 (04 Aug 2022 06:00) (19 - 38)  SpO2: 97% (03 Aug 2022 18:00) (96% - 99%) on 2lpm nasal canula     General: Awake. Alert. Slow speech. Cooperative. No distress. Appears stated age.	  HEENT:  Right craniotomy incision stapled. Normocephalic. Anicteric. Normal oral mucosa. PERRL. EOMI.  Neck: Supple. Trachea midline. Thyroid without enlargement/tenderness/nodules. No carotid bruit. No JVD.	  Cardiovascular: Regular rate and rhythm. S1 S2 normal. No murmurs, rubs or gallops.  Respiratory: Respirations unlabored. Clear to auscultation and percussion bilaterally. No curvature.  Abdomen: Soft. Non-tender. Non-distended. No organomegaly. No masses. Normal bowel sounds.  Extremities: Warm to touch. No clubbing or cyanosis. No pedal edema.   Pulses: 2+ peripheral pulses all extremities.	  Skin: Craniotomy scar C/D/I. Abrasion on the forehead at the site of the craniotomy dressing  Lymph Nodes: Cervical, supraclavicular and axillary nodes normal  Neurological: Moving all extremities. A and O x 3.  Psychiatry: Calm    LABS:                          11.5   20.39 )-----------( 182      ( 04 Aug 2022 04:20 )             34.9     CBC    WBC  20.39 <==, 18.86 <==, 16.24 <==, 17.37 <==, 25.02 <==, 19.00 <==, 21.01 <==    Hemoglobin  11.5 <<==, 13.2 <<==, 13.3 <<==, 13.4 <<==, 15.2 <<==, 12.8 <<==, 12.4 <<==    Hematocrit  34.9 <==, 40.4 <==, 40.7 <==, 40.5 <==, 45.4 <==, 38.5 <==, 37.1 <==    Platelets  182 <==, 230 <==, 267 <==, 303 <==, 336 <==, 258 <==, 235 <==      137  |  100  |  41<H>  ----------------------------<  264<H>    08-04  4.3   |  24  |  1.06      LYTES    sodium  137 <==, 140 <==, 141 <==, 136 <==, 141 <==, 139 <==, 140 <==    potassium   4.3 <==, 4.5 <==, 4.5 <==, 4.2 <==, 4.4 <==, 4.6 <==, 4.5 <==    chloride  100 <==, 102 <==, 102 <==, 94 <==, 102 <==, 102 <==, 104 <==    carbon dioxide  24 <==, 25 <==, 28 <==, 25 <==, 28 <==, 26 <==, 25 <==    =============================================================================================  RENAL FUNCTION:    Creatinine:   1.06  <<==, 1.28  <<==, 1.23  <<==, 1.24  <<==, 0.94  <<==, 0.85  <<==, 1.15  <<==    BUN:   41 <==, 56 <==, 60 <==, 55 <==, 42 <==, 34 <==, 31 <==    ============================================================================================    calcium   8.2 <==, 8.9 <==, 8.8 <==, 9.0 <==, 9.2 <==, 9.9 <==, 9.1 <==    phos   5.3 <==, 2.7 <==, 3.3 <==, 3.4 <==, 4.2 <==    mag   1.8 <==, 2.2 <==, 1.7 <==, 1.6 <==, 1.9 <==    ============================================================================================  LFTs    AST:   48 <== , 57 <== , 53 <== , 25 <==     ALT:  17  <== , 16  <== , 14  <== , 16  <==     AP:  57  <=, 66  <=, 63  <=, 75  <=    Bili:  0.4  <=, 0.2  <=, 0.2  <=, 0.2  <=    PT/INR - ( 04 Aug 2022 04:20 )   PT: 18.3 sec;   INR: 1.57 ratio      PTT - ( 04 Aug 2022 06:10 )  PTT:54.5 sec    ABG - ( 03 Aug 2022 08:23 )  pH: 7.56  /  pCO2: 33    /  pO2: 84    / HCO3: 30    / Base Excess: 7.3   /  SaO2: 97.0      Procalcitonin, Serum: 0.11 ng/mL ( @ 07:45)    Serum Pro-Brain Natriuretic Peptide: 7071 pg/mL ( @ 04:20)  Serum Pro-Brain Natriuretic Peptide: 7944 pg/mL ( @ 07:45)  Serum Pro-Brain Natriuretic Peptide: 7011 pg/mL ( @ 04:07)  Serum Pro-Brain Natriuretic Peptide: 1286 pg/mL ( @ 20:29)    CARDIAC MARKERS ( 04 Aug 2022 04:20 )   U/L /CKMB x     /CKMB Units 18.9 ng/mL    troponin 1971 ng/L    CARDIAC MARKERS ( 03 Aug 2022 08:57 )   U/L /CKMB x     /CKMB Units 29.3 ng/mL    troponin 1121 ng/LCARDIAC MARKERS ( 03 Aug 2022 07:45 )   U/L /CKMB x     /CKMB Units 32.0 ng/mL    troponin 1120 ng/LCARDIAC MARKERS ( 02 Aug 2022 17:12 )   U/L /CKMB x     /CKMB Units 63.2 ng/mL    troponin 948 ng/LCARDIAC MARKERS ( 02 Aug 2022 11:09 )   U/L /CKMB x     /CKMB Units 72.7 ng/mL    troponin 961 ng/LCARDIAC MARKERS ( 02 Aug 2022 04:07 )   U/L /CKMB x     /CKMB Units 57.1 ng/mL    troponin 622 ng/LCARDIAC MARKERS ( 01 Aug 2022 20:29 )   U/L /CKMB x     /CKMB Units 19.7 ng/mL    troponin 285 ng/LCARDIAC MARKERS ( 01 Aug 2022 15:26 )  CPK 78 U/L /CKMB x     /CKMB Units 2.0 ng/mL    troponin 22 ng/L    < from: Limited Transthoracic Echo (w/Cont) (22 @ 08:02) >    Patient name: LANIE BERTRAND  YOB: 1940   Age: 82 (F)   MR#: [id number]  Study Date: 2022  ------------------------------------------------------------------------  Conclusions:  1. Endocardial visualization enhanced with intravenous  injection of Ultrasonic Enhancing Agent (Lumason). Left  ventricle not well visualized despite intravenous  ultrasonic enhancing agent; grossly, preserved left  ventricular systolic function with segmental wall motion  abnormalities. EF approximately 55%. The mid to distal  inferolateral and mid to distal inferior segments are  hypkinetic. The apex is akinetic. No left ventricular  thrombus seen.  *** Compared with echocardiogram of 2022, results are  similar on today's study. The mid to distal inferior and  the apex appear hypo/akinetic on today's study on images  with intravenous ultrasonic enhancing agent.  ------------------------------------------------------------------------  Confirmed on  2022 - 10:57:19 by Kelsie Becker M.D.  ------------------------------------------------------------------------  ------------------------------------------------------------------------  Surgical Pathology Report (22 @ 11:53)   Surgical Pathology Report:   1. Left mainstem bronchus biopsy   Final Diagnosis   Bronchus, mainstem, left, biopsy:   In summary the morphology and immunophenotype are consistent with poorly   differentiated adenocarcinoma of lung origin.   Dr. Hopkins was notified of the diagnosis on 2022.   ------------------------------------------------------------------------------  MICROBIOLOGY:   Urinalysis Basic - ( 03 Aug 2022 08:40 )    Color: Light Yellow / Appearance: Clear / S.015 / pH: x  Gluc: x / Ketone: Trace  / Bili: Negative / Urobili: Negative   Blood: x / Protein: Negative / Nitrite: Negative   Leuk Esterase: Large / RBC: 1 /hpf / WBC 51 /HPF   Sq Epi: x / Non Sq Epi: 0 /hpf / Bacteria: Many    RADIOLOGY:    EXAM:  [facility]                          PROCEDURE DATE:  2022      FINDINGS:    PULMONARY ANGIOGRAM: Pulmonary embolism in the lateral segmental branch   of the right middle lobe without evidence of right heart strain. Multiple   segmental branches in the left lung are obscured by motion.    LYMPH NODES: Stable 1.2 cm left hilar lymph node.    HEART/VASCULATURE: The heart is normal in size. No pericardial effusion.   There are aortic, aortic valve, and coronary artery calcifications.    AIRWAYS/LUNGS/PLEURA: Patent central airways. Left upper lobe mass   measuring 2.7 x 2.1 cm, series 5 image 50, decreased in size from prior,   and unchanged obstruction left apical posterior bronchus, and associated   segmental atelectasis along the mediastinum. No pleural effusion or   pneumothorax.    UPPER ABDOMEN: Within normal limits.    BONES/SOFT TISSUES: Stable multinodular thyroid. Metallic hardware in the   right humeral head and shaft. Degenerative changes of the spine.    IMPRESSION:  Pulmonary embolism in the lateral segmental branch of the right middle   lobe. No evidence of right heart strain.    Interval decrease in left upper lobe mass. Unchanged obstruction of the   posterior bronchus, and stable fibroglandular lymph node.    COMMUNICATION:  Discussed positive PE findings with [facility] DAVID Poon by Dr. Michelle Rebollar at 12:15 PM on 8/3/2022.    MICHELLE JAUREGUI MD; Resident Radiologist  This document has been electronically signed.  NEHA BAILON M.D., ATTENDINGRADIOGIST  This document has been electronically signed. Aug  3 2022 12:35PM  ---------------------------------------------------------------------------------------------------------------  EXAM:  CT BRAIN                          PROCEDURE DATE:  2022      IMPRESSION:    Redemonstration of right temporal craniotomy for prior resection of a   lesion. There is a large degree of hypoattenuation throughout the right   temporal lobe and extending into the right basal ganglia and right   parietal lobe, suggestive of a combination of vasogenic edema and/or   encephalomalacia/gliosis. This results in secondary mass effect upon the   parenchyma of the right cerebral hemisphere, not significantly changed.   There is mild mass effect on the ventricular system, right greater than   left. There is secondary right to left midline shift of approximately 5.7   mm. Expected postsurgical extra-axial pneumocephalus is reidentified   without significant change. Tiny expected subdural hematoma overlying the   right anterior superior frontal lobe is reidentified without change.    Reidentified is a hypoattenuating lesion within the left medial frontal   lobe measuring 1.4 x 1.6 cm, compatible with known additional metastasis.   No significant surrounding edema.    NERI CERNA MD; Resident Radiologist  This document has been electronically signed.  ALTAF SEBASTIAN MD; Attending Radiologist  This document has been electronically signed. Aug  3 2022 10:23AM  ---------------------------------------------------------------------------------------------------------------  EXAM:  CT ABDOMEN AND PELVIS IC                        EXAM:  CT CHEST IC                          PROCEDURE DATE:  2022      FINDINGS:  CHEST:  LUNGS AND LARGE AIRWAYS: There is a heterogeneous left upper lobe mass   approximately measuring 3.3 x 2.6 cm (2, 28) obstructing the left apical   posterior bronchus with partial atelectasis of the left upper lobe. Few   scattered bilateral pulmonary nodules measuring up to 3 mm in the right   upper lobe (2, 39).  PLEURA: No pleural effusion.  VESSELS: Atherosclerotic changes of the aorta and coronary arteries.  HEART: Heart size is normal. No pericardial effusion.  MEDIASTINUM AND SIXTO: Soft tissue contiguous with mass versus adenopathy   measuring 1.7 x 1.5 cm (2, 33).  CHEST WALL AND LOWER NECK: Bilateral subcentimeter hypoattenuating   thyroid nodules.    ABDOMEN AND PELVIS:  LIVER: Within normal limits.  BILE DUCTS: Normal caliber.  GALLBLADDER: Within normal limits.  SPLEEN: Within normal limits.  PANCREAS: Within normal limits.  ADRENALS: Indeterminant left adrenal nodule measuring 1.2 cm.  KIDNEYS/URETERS: No hydronephrosis. Renal cysts and subcentimeter   hypoattenuating foci bilaterally, too small to characterize.    BLADDER: Within normal limits.  REPRODUCTIVE ORGANS: Uterus and adnexa within normal limits.    BOWEL: Colonic diverticula withoutevidence of acute diverticulitis. No   bowel obstruction. Appendix is normal.  PERITONEUM: No ascites.  VESSELS: Atherosclerotic changes.  RETROPERITONEUM/LYMPH NODES: No lymphadenopathy.  ABDOMINAL WALL: Within normal limits.  BONES: Degenerative changes. Grade 1 anterolisthesis of L5 on S1. Status   post right shoulder arthroplasty.    IMPRESSION:  Left upper lobe lung mass with partial atelectasis of the left upper   lobe, concerning for primary lung neoplasm. Few scattered sub-4 mm   pulmonary nodules, indeterminate.    Indeterminant left adrenal nodule for which contrast enhanced MRI is   recommended for further evaluation.    DEEPAK MORALES MD; Resident Radiologist  This document has been electronically signed.  HAYLEY DENTON MD; Attending Radiologist  This document has been electronically signed. 2022  9:02AM  ---------------------------------------------------------------------------------------------------------------  EXAM:  DUPLEX SCAN EXT VEINS LOWER BI                          PROCEDURE DATE:  2022      IMPRESSION:  No evidence of deep venous thrombosis in either lower extremity.    YENIFER QUAN MD; Attending Radiologist  This document has been electronically signed. 2022  2:08PM  ---------------------------------------------------------------------------------------------------------------

## 2022-08-04 NOTE — PROGRESS NOTE ADULT - SUBJECTIVE AND OBJECTIVE BOX
SUMMARY:  83 y/o female (former smoker) with PMHx of HTN, CAD, HLD, MI, peripheral neuropathy, DM and peripheral artery disease presented to the ED for imaging for her head due to abnormal spine MRI by her orthopedist (Dr. Castro) during workup for her spine for her chronic back problems. The orthopedist noted something in her brain. Otherwise pt has no symptoms, stated that since 04/2022 she has had gait/balance issues. Endorsed that if she sits for a while and gets up, she feels like she is "drunk" and feels off. Denied chest pain, SOB, fevers, chills, numbness/paresthesias, muscular weakness, dizziness, abdominal pain, headaches. MR head significant for R temporal lobe lesion with edema, midline shift and uncal herniation concerning for metastatic disease. 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     HOSPITAL COURSE: R crani for metastatic lesion 7/27 consistent w metastatic non small cell lung cancer, residual lesion on L side of brain. Course complicated on the hospital floors by inferolateral STEMI 8/1, pt transferred to CICU and started on hep gtt no bolus and ASA, no cath given high risk from comorbidities and recent surgery.     24 HOUR EVENTS:   - started on zosyn for +UA, transient pressor requirements overnight inthe s/o ?urosepsis; brodened to includ vanc, pending MRSA Swab  - given 500cc IVB, new onset afib rvr      REVIEW OF SYSTEMS: Denies any symptoms    ALLERGIES: Allergies        VITALS/DATA/ORDERS: [x] Reviewed      EXAMINATION:  General: No acute distress, conversational  HEENT: Anicteric sclerae  Cardiac: [id number]  Lungs: Clear  Abdomen: Soft, non-tender, +BS  Extremities: No c/c/e  Skin/Incision Site: Clean, dry and intact  Neurologic: AOx0, ROBERTH intermittently, PERRL, EOMI, ?R nasolabial flattening, tongue midline, no drift, DE LA GARZA spont/to nox

## 2022-08-04 NOTE — PROGRESS NOTE ADULT - SUBJECTIVE AND OBJECTIVE BOX
Chief complaint  Patient is a 82y old  Female who presents with a chief complaint of abnormal imaging (04 Aug 2022 15:04)   Review of systems  Patient resting in bed, NAD, no hypoglycemic episodes. Spoke with son at bedside.    Labs and Fingersticks  CAPILLARY BLOOD GLUCOSE      POCT Blood Glucose.: 197 mg/dL (04 Aug 2022 12:31)  POCT Blood Glucose.: 198 mg/dL (04 Aug 2022 08:49)  POCT Blood Glucose.: 111 mg/dL (03 Aug 2022 22:24)  POCT Blood Glucose.: 189 mg/dL (03 Aug 2022 17:46)      Anion Gap, Serum: 14 (08-04 @ 04:20)  Anion Gap, Serum: 13 (08-03 @ 04:07)      Calcium, Total Serum: 8.2 *L* (08-04 @ 04:20)  Calcium, Total Serum: 8.9 (08-03 @ 04:07)  Albumin, Serum: 2.2 *L* (08-04 @ 04:20)  Albumin, Serum: 2.8 *L* (08-03 @ 04:07)    Alanine Aminotransferase (ALT/SGPT): 17 (08-04 @ 04:20)  Alanine Aminotransferase (ALT/SGPT): 16 (08-03 @ 04:07)  Alkaline Phosphatase, Serum: 57 (08-04 @ 04:20)  Alkaline Phosphatase, Serum: 66 (08-03 @ 04:07)  Aspartate Aminotransferase (AST/SGOT): 48 *H* (08-04 @ 04:20)  Aspartate Aminotransferase (AST/SGOT): 57 *H* (08-03 @ 04:07)        08-04    137  |  100  |  41<H>  ----------------------------<  264<H>  4.3   |  24  |  1.06    Ca    8.2<L>      04 Aug 2022 04:20  Phos  5.3     08-04  Mg     1.8     08-04    TPro  5.0<L>  /  Alb  2.2<L>  /  TBili  0.4  /  DBili  x   /  AST  48<H>  /  ALT  17  /  AlkPhos  57  08-04                        12.4   17.41 )-----------( 219      ( 04 Aug 2022 15:09 )             38.9     Medications  MEDICATIONS  (STANDING):  acetylcysteine 10%  Inhalation 4 milliLiter(s) Inhalation every 6 hours  aspirin enteric coated 81 milliGRAM(s) Oral daily  atorvastatin 80 milliGRAM(s) Oral at bedtime  BACItracin   Ointment 1 Application(s) Topical two times a day  brivaracetam  IVPB 100 milliGRAM(s) IV Intermittent two times a day  dexAMETHasone  Injectable 1 milliGRAM(s) IV Push every 12 hours  gabapentin 200 milliGRAM(s) Oral at bedtime  heparin  Infusion 750 Unit(s)/Hr (7.5 mL/Hr) IV Continuous <Continuous>  insulin glargine Injectable (LANTUS) 10 Unit(s) SubCutaneous at bedtime  insulin lispro (ADMELOG) corrective regimen sliding scale   SubCutaneous Before meals and at bedtime  losartan 100 milliGRAM(s) Oral daily  metoprolol tartrate 12.5 milliGRAM(s) Oral two times a day  pantoprazole  Injectable 40 milliGRAM(s) IV Push every 24 hours  piperacillin/tazobactam IVPB.. 3.375 Gram(s) IV Intermittent every 8 hours  polyethylene glycol 3350 17 Gram(s) Oral two times a day  senna 2 Tablet(s) Oral at bedtime  sodium chloride 3%  Inhalation 4 milliLiter(s) Inhalation every 6 hours  vancomycin  IVPB      vancomycin  IVPB 500 milliGRAM(s) IV Intermittent every 8 hours      Physical Exam  General: Patient comfortable in bed  Vital Signs Last 12 Hrs  T(F): 97.9 (08-04-22 @ 12:00), Max: 97.9 (08-04-22 @ 12:00)  HR: 63 (08-04-22 @ 15:00) (56 - 74)  BP: 119/58 (08-04-22 @ 15:00) (97/55 - 174/69)  BP(mean): 83 (08-04-22 @ 15:00) (72 - 99)  RR: 23 (08-04-22 @ 15:00) (18 - 25)  SpO2: 100% (08-04-22 @ 15:00) (96% - 100%)    Diagnostics    Metanephrine, Plasma: AM Sched. Collection: 21-Jul-2022 06:00 (07-20 @ 10:54)  Aldosterone, Serum: AM Sched. Collection: 21-Jul-2022 06:00 (07-20 @ 10:54)  Cortisol AM, Serum: AM Sched. Collection: 21-Jul-2022 06:00 (07-20 @ 10:54)

## 2022-08-04 NOTE — PROGRESS NOTE ADULT - SUBJECTIVE AND OBJECTIVE BOX
SUMMARY:  81 y/o female (former smoker) with PMHx of HTN, CAD, HLD, MI, peripheral neuropathy, DM and peripheral artery disease presented to the ED for imaging for her head due to abnormal spine MRI by her orthopedist (Dr. Castro) during workup for her spine for her chronic back problems. The orthopedist noted something in her brain. Otherwise pt has no symptoms, stated that since 04/2022 she has had gait/balance issues. Endorsed that if she sits for a while and gets up, she feels like she is "drunk" and feels off. Denied chest pain, SOB, fevers, chills, numbness/paresthesias, muscular weakness, dizziness, abdominal pain, headaches. MR head significant for R temporal lobe lesion with edema, midline shift and uncal herniation concerning for metastatic disease. 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     HOSPITAL COURSE: R crani for metastatic lesion 7/27 consistent w metastatic non small cell lung cancer, residual lesion on L side of brain. Course complicated on the hospital floors by inferolateral STEMI 8/1, pt transferred to CICU and started on hep gtt no bolus and ASA, no cath given high risk from comorbidities and recent surgery.     24 HOUR EVENTS:   - started on zosyn for +UA, transient pressor requirements overnight i nthe s/o ?urosepsis; brodened to includ vanc, pending MRSA Swab  - given 500cc IVB, new onset afib rvr      REVIEW OF SYSTEMS: Denies any symptoms    ALLERGIES: Allergies        VITALS/DATA/ORDERS: [x] Reviewed      EXAMINATION:  General: No acute distress, conversational  HEENT: Anicteric sclerae  Cardiac: [id number]  Lungs: Clear  Abdomen: Soft, non-tender, +BS  Extremities: No c/c/e  Skin/Incision Site: Clean, dry and intact  Neurologic: AOx0, ROBERTH intermittently, PERRL, EOMI, ?R nasolabial flattening, tongue midline, no drift, DE LA GARZA spont/to nox SUMMARY:  81 y/o female (former smoker) with PMHx of HTN, CAD, HLD, MI, peripheral neuropathy, DM and peripheral artery disease presented to the ED for imaging for her head due to abnormal spine MRI by her orthopedist (Dr. Castro) during workup for her spine for her chronic back problems. The orthopedist noted something in her brain. Otherwise pt has no symptoms, stated that since 04/2022 she has had gait/balance issues. Endorsed that if she sits for a while and gets up, she feels like she is "drunk" and feels off. Denied chest pain, SOB, fevers, chills, numbness/paresthesias, muscular weakness, dizziness, abdominal pain, headaches. MR head significant for R temporal lobe lesion with edema, midline shift and uncal herniation concerning for metastatic disease. 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     HOSPITAL COURSE: R crani for metastatic lesion 7/27 consistent w metastatic non small cell lung cancer, residual lesion on L side of brain. Course complicated on the hospital floors by inferolateral STEMI 8/1, pt transferred to CICU and started on hep gtt no bolus and ASA, no cath given high risk from comorbidities and recent surgery.     24 HOUR EVENTS:   - no sz on eeg, dc'ed  -mental status significantly improved  - on zosyn for uti      REVIEW OF SYSTEMS: Denies any symptoms    ALLERGIES: Allergies        VITALS/DATA/ORDERS: [x] Reviewed      EXAMINATION:  General: No acute distress, conversational  HEENT: Anicteric sclerae  Cardiac: [id number]  Lungs: Clear  Abdomen: Soft, non-tender, +BS  Extremities: No c/c/e  Skin/Incision Site: Clean, dry and intact  Neurologic: AOx3, fc, no facial, DE LA GARZA spont/ag/purposeful

## 2022-08-04 NOTE — PROGRESS NOTE ADULT - ASSESSMENT
-POD 8 from Right craniotomy for tumor resection  -Levo to keep -160, MAP >65  -Continue Zosyn for UTI

## 2022-08-04 NOTE — PROGRESS NOTE ADULT - ASSESSMENT
ASSESSMENT:    81 y/o female (former smoker) with PMHx of HTN, CAD, HLD, MI, peripheral neuropathy, DM and peripheral artery disease presented to the ED for imaging for her head due to abnormal spine MRI by her orthopedist (Dr. Castro) during workup for her spine for her chronic back problems. The orthopedist noted something in her brain. Otherwise pt has no symptoms, stated that since 04/2022 she has had gait/balance issues. Endorsed that if she sits for a while and gets up, she feels like she is "drunk" and feels off. Denied chest pain, SOB, fevers, chills, numbness/paresthesias, muscular weakness, dizziness, abdominal pain, headaches.     NSCU consulted for encephalopathy in s/o likely UTI/early urosepsis, rule out seizures    NEURO:  [id number]  CT Head if any mental status changes  Pain control  VEEG to r/o sz given mental status change  cont steroids taper per nsgy and keppra per nsgy, no hx of seizures  Activity: [] OOB as tolerated [] Bedrest [x] PT [x] OT [] PMNR    PULM: RA  s/p bronchoscopy with endobronchial brushings/biopsies of an obstructing left upper lobe endobronchial lesion -> 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; No O2 requirements, pulmonary on board  Incentive spirometry, mobilize as tolerated  on 4L NC, wean as tolerated  small incidental PE  Repeat CXR    CV:  Keep -160mmHg (MAP >65 in the s/o sepsis)  EF 55%  STEMI being treated medically, cont hep gtt goal 55-65 monitor ptt, cont ASA  cardiology following  cont losartan  cont lopressor  new onset afib  levo PRN    RENAL: borderline PERCY w high BUN, continue hydration if ok w cardiology keep net even to 500cc+  cont straight caths, bladder [id number]  See ID for UA+  check ur lytes    GI: reg diet  GI prophylaxis x PPI [] other:  Bowel regimen standing    ENDO: Endo following  outpt FU for adrenal and thyroid abnormalities  ISS, premeals and lantus 20U  Goal euglycemia (-180)    HEME/ONC: hep gtt  small RML PE on CTA today, on hep gtt, if any worsening O2 requirements, consult cardio interventional  SCDs    ID: on zosyn, 5 day course  WBC high, could be steroid induced hyperglycemia vs new UTI  FU blood and urine cultures  consult ID  vanc empricically  MRSA swab      MISC:    SOCIAL/FAMILY:  [] awaiting [x] updated at bedside [] family meeting    CODE STATUS:  [x] Full Code [] DNR [] DNI [] Palliative/Comfort Care    DISPOSITION:  [x] ICU [] Stroke Unit [] Floor [] EMU [] RCU [] PCU    [] Patient is at high risk of neurologic deterioration/death due to:     Time seen:  Time spent: ___ [] critical care minutes    Contact: [phone]

## 2022-08-04 NOTE — CONSULT NOTE ADULT - SUBJECTIVE AND OBJECTIVE BOX
HPI:   Patient is a 82y female with cad, pvd, neuropathy, dm who developed back pain and unsteady gait some months ago, seen by orthopedist who found brain lesins hence admitted  for further evaluation. She was found to have lung mass and brain mets. She had bronch on  and bx showed poorly differentiated cancer. SH underwen tright craniotomy for brain mass and found to be adenoca of lung origin on . On  she developed chest pain and found to have stemi. On 8/3 she was very sleepy, had ct angio of chest and found to have a pE. She was also placed on zosyn and vanco  due to pyuria. She has been much more alert today and is having eeg for seizure evaluation.   SHe is now on a heparin drip. Urine cx pend, bc so far neg. She reports that I dont look old enough to be a doctor so she is obviously somewhat confused and she feels like she is drunk all the time. No other complaints. She is planned for eventual brain RT since has another mass and for chemo  REVIEW OF SYSTEMS:  All other review of systems negative (Comprehensive ROS)    PAST MEDICAL & SURGICAL HISTORY:  Hypertension      Coronary artery disease      Hyperlipidemia      Peripheral artery disease      Myocardial infarct      Peripheral neuropathy      Diabetes      PAD (peripheral artery disease)      CAD (coronary artery disease)  stents          Allergies    adhesives (Pruritus)  No Known Drug Allergies    Intolerances        Antimicrobials Day #  :2  piperacillin/tazobactam IVPB.. 3.375 Gram(s) IV Intermittent every 8 hours  vancomycin  IVPB      vancomycin  IVPB 500 milliGRAM(s) IV Intermittent every 8 hours    Other Medications:  acetylcysteine 10%  Inhalation 4 milliLiter(s) Inhalation every 6 hours  aspirin enteric coated 81 milliGRAM(s) Oral daily  atorvastatin 80 milliGRAM(s) Oral at bedtime  BACItracin   Ointment 1 Application(s) Topical two times a day  brivaracetam  IVPB 100 milliGRAM(s) IV Intermittent two times a day  dexAMETHasone  Injectable 1 milliGRAM(s) IV Push every 12 hours  gabapentin 200 milliGRAM(s) Oral at bedtime  heparin  Infusion 750 Unit(s)/Hr IV Continuous <Continuous>  insulin glargine Injectable (LANTUS) 10 Unit(s) SubCutaneous at bedtime  insulin lispro (ADMELOG) corrective regimen sliding scale   SubCutaneous Before meals and at bedtime  losartan 100 milliGRAM(s) Oral daily  melatonin 5 milliGRAM(s) Oral at bedtime PRN  metoprolol tartrate 12.5 milliGRAM(s) Oral two times a day  pantoprazole  Injectable 40 milliGRAM(s) IV Push every 24 hours  polyethylene glycol 3350 17 Gram(s) Oral two times a day  senna 2 Tablet(s) Oral at bedtime  sodium chloride 3%  Inhalation 4 milliLiter(s) Inhalation every 6 hours      FAMILY HISTORY:  Family history of stroke (Sibling)        SOCIAL HISTORY:  Smoking: [ ]Yes [ x]No  ETOH: [ ]Yesx [ ]No  Drug Use: [ ]Yes [ x]No   [x ] Single[ ]    T(F): 97.9 (22 @ 12:00), Max: 98.2 (22 @ 20:00)  HR: 63 (22 @ 15:00)  BP: 119/58 (22 @ 15:00)  RR: 23 (22 @ 15:00)  SpO2: 100% (22 @ 15:00)  Wt(kg): --    PHYSICAL EXAM:  General: alert, no acute distress, speech mild slur  excoriation on forehead  Eyes:  anicteric, no conjunctival injection, no discharge  Oropharynx: no lesions or injection 	  Neck: supple, without adenopathy  Lungs: clear to auscultation  Heart: regular rate and rhythm; no murmur, rubs or gallops  Abdomen: soft, nondistended, nontender, without mass or organomegaly  Skin: no lesions  Extremities: no clubbing, cyanosis, or edema  Neurologic: alert, moves all extremities  head wound is clean  LAB RESULTS:                        12.4   17.41 )-----------( 219      ( 04 Aug 2022 15:09 )             38.9     08-04    140  |  103  |  44<H>  ----------------------------<  200<H>  4.5   |  25  |  1.13    Ca    9.0      04 Aug 2022 15:08  Phos  4.6     08-04  Mg     2.5     08-04    TPro  5.0<L>  /  Alb  2.2<L>  /  TBili  0.4  /  DBili  x   /  AST  48<H>  /  ALT  17  /  AlkPhos  57  08-04    LIVER FUNCTIONS - ( 04 Aug 2022 04:20 )  Alb: 2.2 g/dL / Pro: 5.0 g/dL / ALK PHOS: 57 U/L / ALT: 17 U/L / AST: 48 U/L / GGT: x           Urinalysis Basic - ( 03 Aug 2022 08:40 )    Color: Light Yellow / Appearance: Clear / S.015 / pH: x  Gluc: x / Ketone: Trace  / Bili: Negative / Urobili: Negative   Blood: x / Protein: Negative / Nitrite: Negative   Leuk Esterase: Large / RBC: 1 /hpf / WBC 51 /HPF   Sq Epi: x / Non Sq Epi: 0 /hpf / Bacteria: Many        MICROBIOLOGY:  RECENT CULTURES:   @ 10:59 .Blood Blood     No growth to date.       @ 10:58 .Blood Blood     No growth to date.            RADIOLOGY REVIEWED:    < from: US Abdomen Complete (US Abdomen Complete .) (22 @ 15:00) >    ACC: [id number] EXAM:  US ABDOMEN COMPLETE                          PROCEDURE DATE:  2022          INTERPRETATION:  CLINICAL INFORMATION: 82 year-old female presenting with   abnormal MRI as well as gait/balance issues. Evaluate for renal and liver   acute pathology. Pulmonary embolization.      COMPARISON: CT Abdomen 2022. Chest CT 8/3/2022.    TECHNIQUE: Sonography of the abdomen.    FINDINGS:  Liver: Increased echogenicity. Smooth contour.  Bile ducts: Normal caliber. Common bile duct measures 3 mm.  Gallbladder: Mildly distended. No stones. No sludge. Negative near the   sonographic Rivera's sign.  Pancreas: Some degree of atrophy. Limited visualization due to bowel gas.  Spleen: 9.9 cm. Within normal limits.  Right kidney: 9.4cm. Possible mild hydronephrosis including a mildly   dilated renal pelvis.  Left kidney: 11.4 cm. No hydronephrosis. Several small cysts lower pole   cyst measuring 1.1 x 1.1 x 0.9 cm at the lower pole.  Ascites: None.  Aorta and IVC: Aorta with atherosclerotic changes. IVC is patent..    IMPRESSION:    Mildly distended gallbladder. No evidence of acute cholecystitis.    Possible mild hydronephrosis.    < from: CT Angio Chest PE Protocol w/ IV Cont (22 @ 09:59) >    ACC: [id number] EXAM:  CT ANGIO CHEST PULM ART [facility]                          PROCEDURE DATE:  2022          INTERPRETATION:  CLINICAL INFORMATION: Patient with metastatic brain   lesion status post craniotomy, metastatic lung cancer status post   bronchoscopy and biopsy indicating poorly differentiated adenocarcinoma   subsequent STEMI now with altered mental status and unresponsiveness.   Evaluate for PE    COMPARISON: CT chest from 2022    CONTRAST/COMPLICATIONS:  IV Contrast: Omnipaque 350  90 cc administered   0 cc discarded  Oral Contrast: NONE  Complications: None reported at time of study completion    PROCEDURE:  CT Angiogram of the chest was obtained with intravenous contrast. Three   dimensional maximum intensity projection (MIP) images were generated.    FINDINGS:    PULMONARY ANGIOGRAM: Pulmonary embolism in the lateral segmental branch   of the right middle lobe without evidence of right heart strain. Multiple   segmental branches in the left lung are obscured by motion.    LYMPH NODES: Stable 1.2 cm left hilar lymph node.    HEART/VASCULATURE: The heart is normal in size. No pericardial effusion.   There are aortic, aortic valve, and coronary artery calcifications.    AIRWAYS/LUNGS/PLEURA: Patent central airways. Left upper lobe mass   measuring 2.7 x 2.1 cm, series 5 image 50, decreased in size from prior,   and unchanged obstruction left apical posterior bronchus, and associated   segmental atelectasis along the mediastinum. No pleural effusion or   pneumothorax.    UPPER ABDOMEN: Within normal limits.    BONES/SOFT TISSUES: Stable multinodular thyroid. Metallic hardware in the   right humeral head and shaft. Degenerative changes of the spine.    IMPRESSION:  Pulmonary embolism in the lateral segmental branch of the right middle   lobe. No evidence of right heart strain.    Interval decrease in left upper lobe mass. Unchanged obstruction of the   posterior bronchus, and stable fibroglandular lymph node.    COMMUNICATION:  Discussed positive PE findings with NSICU DAVID Poon by Dr. Joycelyn Rebollar at 12:15 PM on 8/3/2022.    --- End of Report ---      < from: MR Head w/wo IV Cont (22 @ 10:48) >  ACC: [id number] EXAM:  MR BRAIN WAW                           PROCEDURE DATE:  2022          INTERPRETATION:  .    CLINICAL INFORMATION: Postoperative.    TECHNIQUE: Multiplanar multisequential MRI of the brain was acquired with   and withoutthe administration of IV gadolinium. 7 cc's of IV Gadavist   was administered for the purposes of this examination. 0.5 cc's were   discarded.    COMPARISON: Most recent prior CT study of the head from 2022. Prior   contrast enhanced brain MRI study dated 2022. Initial head CT exam   from 2022..    FINDINGS: Right-sided craniotomy changes are again seen status post   resection of a right lateral temporal lobe mass. Underlying hemorrhage   and/or postoperative material is seen within the resection bed which   limits evaluation for enhancement. A linear rim of diffusion abnormality   around the surgical margins is compatible with devitalized tissue.   Surrounding vasogenic edema and mass effect remain. Extra-axial   pneumocephalus and fluid is seen along the right convexity. Shift of the   midline structures from right to left appears unchanged when compared   with the most recent prior postoperative CT exam. Right-sided parietal   subgaleal soft tissue swelling is again noted.    Previous noted ring-enhancing left-sided parafalcine lesion appears   unchanged. Mild surrounding mass effect and edema signal is seen.    No new enhancing masses are seen.    There is no hydrocephalus.    Flow-voids are noted throughout the major intracranial vessels, on the T2   weighted images, consistent with their patency.    The paranasal sinuses and left mastoid air cells are clear. Fluid signal   is noted within the right tympanomastoid cavity. The orbits appear   unremarkable apart from bilateral cataract removal.    IMPRESSION: Redemonstration of right-sided craniotomy changes status post   resection of a right temporal lobe mass with expected postoperative   changes. Residual hemorrhage and/or postoperative material within the  postoperative bed limits evaluation for enhancement.    Unchanged appearing ring-enhancing lesion in the left parafalcine   location for which a residual metastatic lesion is a possibility.    --- End of Report ---    < end of copied text >    < end of copied text >    < end of copied text >        Impression:    83 y/o woman admitted for evaluation of brain masses after seen for gait disturbance, found to have lung mass with some post obstructive changes and 2 brain masses. SHe had bronch on  with poorly differentiated cancer found and had craniotomy for resection of right temporal lobe mass with finding of adenoca of lung origin on .  she had chest pain and found to have an MI. She did not have cath due to too high risk in face of recent brain surgery. Yesterday she got very lethargic , wbc high despite decadron taperand had a ct angio chest with PE found and some suggestion of post obstructive pneumonia . She also has pyuria . She has not had fever. Today she is awake and seems confused but otherwise intact and is being evaluated for seizure. Vanco and zosyn commenced for concern of pneumonia/uti. Leukocytosis has multiple reasons in this patient including the cardiac event, PE and steroids. I am not as suspect of infection but for now given that she did get better after starting antibiotics and cultures pend , will continue zosyn but hold further vanco to limit renal toxicity.       Recommendations:  will continue zosyn pending further culture data, day 2  if cultures are negative and she remains stable anticipate limited course of antibiotics.   supportive care per cicu and nscu team

## 2022-08-04 NOTE — PROGRESS NOTE ADULT - ASSESSMENT
ASSESSMENT:    83 y/o female (former smoker) with PMHx of HTN, CAD, HLD, MI, peripheral neuropathy, DM and peripheral artery disease presented to the ED for imaging for her head due to abnormal spine MRI by her orthopedist (Dr. Castro) during workup for her spine for her chronic back problems. The orthopedist noted something in her brain. Otherwise pt has no symptoms, stated that since 04/2022 she has had gait/balance issues. Endorsed that if she sits for a while and gets up, she feels like she is "drunk" and feels off. Denied chest pain, SOB, fevers, chills, numbness/paresthesias, muscular weakness, dizziness, abdominal pain, headaches.     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; No O2 requirements, pulmonary on board  Incentive spirometry, mobilize as tolerated  on 4L NC, wean as tolerated  small incidental PE  Repeat CXR    CV:  Keep -160mmHg (MAP >65 in the s/o sepsis)  EF 55%  STEMI being treated medically, cont hep gtt goal 55-65 monitor ptt, cont ASA  cardiology following  cont losartan  cont lopressor  new onset afib  levo PRN    RENAL: borderline PERCY w high BUN, continue hydration if ok w cardiology keep net even to 500cc+  cont straight caths, bladder [id number]  See ID for UA+  check ur lytes    GI: reg diet  GI prophylaxis x PPI [] other:  Bowel regimen standing    ENDO: Endo following  outpt FU for adrenal and thyroid abnormalities  ISS, premeals and lantus 20U  Goal euglycemia (-180)    HEME/ONC: hep gtt  small RML PE on CTA today, on hep gtt, if any worsening O2 requirements, consult cardio interventional  SCDs    ID: on zosyn, 5 day course  WBC high, could be steroid induced hyperglycemia vs new UTI  FU blood and urine cultures  consult ID  vanc empricically  MRSA swab      MISC:    SOCIAL/FAMILY:  [] awaiting [x] updated at bedside [] family meeting    CODE STATUS:  [x] Full Code [] DNR [] DNI [] Palliative/Comfort Care    DISPOSITION:  [x] ICU [] Stroke Unit [] Floor [] EMU [] RCU [] PCU    [] Patient is at high risk of neurologic deterioration/death due to:     Time seen:  Time spent: ___ [] critical care minutes    Contact: [phone] ASSESSMENT:    83 y/o female (former smoker) with PMHx of HTN, CAD, HLD, MI, peripheral neuropathy, DM and peripheral artery disease presented to the ED for imaging for her head due to abnormal spine MRI by her orthopedist (Dr. Castro) during workup for her spine for her chronic back problems. The orthopedist noted something in her brain. Otherwise pt has no symptoms, stated that since 04/2022 she has had gait/balance issues. Endorsed that if she sits for a while and gets up, she feels like she is "drunk" and feels off. Denied chest pain, SOB, fevers, chills, numbness/paresthesias, muscular weakness, dizziness, abdominal pain, headaches.     NSCU consulted for encephalopathy in s/o likely UTI/early urosepsis, rule out seizures    NEURO:  [id number]  CT Head if any mental status changes  Pain control  DC eeg  cont steroids taper per nsgy and keppra per nsgy, no hx of seizures  Activity: [] OOB as tolerated [] Bedrest [x] PT [x] OT [] PMNR    PULM: RA  s/p bronchoscopy with endobronchial brushings/biopsies of an obstructing left upper lobe endobronchial lesion -> 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; No O2 requirements, pulmonary on board  Incentive spirometry, mobilize as tolerated  on 4L NC, wean as tolerated  small incidental PE  Repeat CXR    CV:  Keep -160mmHg (MAP >65 in the s/o sepsis)  EF 55%  STEMI being treated medically, cont hep gtt goal 55-65 monitor ptt, cont ASA  cardiology following  cont losartan  cont lopressor  new onset afib  levo PRN    RENAL: borderline PERCY w high BUN, continue hydration if ok w cardiology keep net even to 500cc+  cont straight caths, bladder [id number]  See ID for UA+  check ur lytes    GI: reg diet  GI prophylaxis x PPI [] other:  Bowel regimen standing    ENDO: Endo following  outpt FU for adrenal and thyroid abnormalities  ISS, premeals and lantus 20U  Goal euglycemia (-180)    HEME/ONC: hep gtt  small RML PE on CTA today, on hep gtt, if any worsening O2 requirements, consult cardio interventional  SCDs    ID: on zosyn, 5 day course  WBC high, could be steroid induced hyperglycemia vs new UTI  FU blood and urine cultures  consult ID  vanc empricically  MRSA swab      MISC:    SOCIAL/FAMILY:  [] awaiting [x] updated at bedside [] family meeting    CODE STATUS:  [x] Full Code [] DNR [] DNI [] Palliative/Comfort Care    DISPOSITION:  [x] ICU [] Stroke Unit [] Floor [] EMU [] RCU [] PCU    [] Patient is at high risk of neurologic deterioration/death due to:     Time seen:  Time spent: ___ [] critical care minutes    Contact: [phone]

## 2022-08-04 NOTE — PROGRESS NOTE ADULT - SUBJECTIVE AND OBJECTIVE BOX
LANIE BERTRAND  MRN-[id number]    Patient is a 82y old  Female who presents with a chief complaint of abnormal imaging (04 Aug 2022 06:47)      Review of System  REVIEW OF SYSTEMS      General:	Denies fatigue, fevers, chills, sweats, decreased appetite.    Skin/Breast: denies pruritis, rash  	  Ophthalmologic: no change in vision or blurring  	  HEENT	Denies dry mouth, oral sores, dysphagia,  change in hearing.    Respiratory and Thorax:  cough, sob, wheeze, hemoptysis  	  Cardiovascular:	no cp , palp, orthopnea    Gastrointestinal:	no n/v/d constipation    Genitourinary:	no dysuria of frequency, no hematuria, no flank pain    Musculoskeletal:	no bone or joint pain. no muscle aches.     Neurological:	no change in sensory or motor function. no headache. no weakness.     Psychiatric:	no depression, no anxiety, insomnia.     Hematology/Lymphatics:	no bleeding or bruising        Current Meds  MEDICATIONS  (STANDING):  acetylcysteine 10%  Inhalation 4 milliLiter(s) Inhalation every 6 hours  aspirin enteric coated 81 milliGRAM(s) Oral daily  atorvastatin 80 milliGRAM(s) Oral at bedtime  BACItracin   Ointment 1 Application(s) Topical two times a day  brivaracetam  IVPB 100 milliGRAM(s) IV Intermittent two times a day  dexAMETHasone  Injectable 2 milliGRAM(s) IV Push every 12 hours  gabapentin 200 milliGRAM(s) Oral at bedtime  heparin  Infusion 700 Unit(s)/Hr (7 mL/Hr) IV Continuous <Continuous>  insulin glargine Injectable (LANTUS) 20 Unit(s) SubCutaneous at bedtime  insulin lispro (ADMELOG) corrective regimen sliding scale   SubCutaneous Before meals and at bedtime  insulin lispro Injectable (ADMELOG) 5 Unit(s) SubCutaneous before lunch  insulin lispro Injectable (ADMELOG) 7 Unit(s) SubCutaneous before breakfast  insulin lispro Injectable (ADMELOG) 7 Unit(s) SubCutaneous before dinner  losartan 100 milliGRAM(s) Oral daily  metoprolol tartrate 12.5 milliGRAM(s) Oral two times a day  norepinephrine Infusion 0.05 MICROgram(s)/kG/Min (6.89 mL/Hr) IV Continuous <Continuous>  pantoprazole    Tablet 40 milliGRAM(s) Oral before breakfast  piperacillin/tazobactam IVPB.. 3.375 Gram(s) IV Intermittent every 8 hours  polyethylene glycol 3350 17 Gram(s) Oral two times a day  senna 2 Tablet(s) Oral at bedtime  sodium chloride 3%  Inhalation 4 milliLiter(s) Inhalation every 6 hours  vancomycin  IVPB      vancomycin  IVPB 500 milliGRAM(s) IV Intermittent every 8 hours    MEDICATIONS  (PRN):  bisacodyl 5 milliGRAM(s) Oral daily PRN Constipation  melatonin 5 milliGRAM(s) Oral at bedtime PRN Sleep      Vitals  Vital Signs Last 24 Hrs  T(C): 36.3 (04 Aug 2022 04:00), Max: 36.8 (03 Aug 2022 12:00)  T(F): 97.4 (04 Aug 2022 04:00), Max: 98.3 (03 Aug 2022 12:00)  HR: 69 (04 Aug 2022 06:00) (64 - 120)  BP: 120/58 (04 Aug 2022 06:00) (66/44 - 137/62)  BP(mean): 72 (04 Aug 2022 05:00) (51 - 91)  RR: 20 (04 Aug 2022 06:00) (19 - 38)  SpO2: 97% (03 Aug 2022 18:00) (94% - 99%)    Parameters below as of 04 Aug 2022 06:00  Patient On (Oxygen Delivery Method): nasal cannula  O2 Flow (L/min): 2      Physical Exam  PHYSICAL EXAM:      Constitutional: NAD    Eyes: PERRLA EOMI, anicteric sclera    Heent :No oral sores, no pharyngeal injection. moist mucosa.    Neck: supple, no jvd, no LAD    Respiratory: CTA b/l     Cardiovascular: s1s2, no m/g/r    Gastrointestinal: soft, nt, nd, + BS    Extremities: no c/c/e    Neurological:A&O x 3 moves all ext.    Skin: no rash on exposed skin    Lymph Nodes: no lymphadenopathy.              Lab  CBC Full  -  ( 04 Aug 2022 04:20 )  WBC Count : 20.39 K/uL  RBC Count : 3.69 M/uL  Hemoglobin : 11.5 g/dL  Hematocrit : 34.9 %  Platelet Count - Automated : 182 K/uL  Mean Cell Volume : 94.6 fl  Mean Cell Hemoglobin : 31.2 pg  Mean Cell Hemoglobin Concentration : 33.0 gm/dL  Auto Neutrophil # : x  Auto Lymphocyte # : x  Auto Monocyte # : x  Auto Eosinophil # : x  Auto Basophil # : x  Auto Neutrophil % : x  Auto Lymphocyte % : x  Auto Monocyte % : x  Auto Eosinophil % : x  Auto Basophil % : x    08-04    137  |  100  |  41<H>  ----------------------------<  264<H>  4.3   |  24  |  1.06    Ca    8.2<L>      04 Aug 2022 04:20  Phos  5.3     08-04  Mg     1.8     08-04    TPro  5.0<L>  /  Alb  2.2<L>  /  TBili  0.4  /  DBili  x   /  AST  48<H>  /  ALT  17  /  AlkPhos  57  08-04    PT/INR - ( 04 Aug 2022 04:20 )   PT: 18.3 sec;   INR: 1.57 ratio         PTT - ( 04 Aug 2022 06:10 )  PTT:54.5 sec    Rad:    Assessment/Plan   JERZYLANIE ARREGUIN  MRN-[id number]    Patient is a 82y old  Female who presents with a chief complaint of abnormal imaging (04 Aug 2022 06:47)      Review of System    resting comfortably    Current Meds  MEDICATIONS  (STANDING):  acetylcysteine 10%  Inhalation 4 milliLiter(s) Inhalation every 6 hours  aspirin enteric coated 81 milliGRAM(s) Oral daily  atorvastatin 80 milliGRAM(s) Oral at bedtime  BACItracin   Ointment 1 Application(s) Topical two times a day  brivaracetam  IVPB 100 milliGRAM(s) IV Intermittent two times a day  dexAMETHasone  Injectable 2 milliGRAM(s) IV Push every 12 hours  gabapentin 200 milliGRAM(s) Oral at bedtime  heparin  Infusion 700 Unit(s)/Hr (7 mL/Hr) IV Continuous <Continuous>  insulin glargine Injectable (LANTUS) 20 Unit(s) SubCutaneous at bedtime  insulin lispro (ADMELOG) corrective regimen sliding scale   SubCutaneous Before meals and at bedtime  insulin lispro Injectable (ADMELOG) 5 Unit(s) SubCutaneous before lunch  insulin lispro Injectable (ADMELOG) 7 Unit(s) SubCutaneous before breakfast  insulin lispro Injectable (ADMELOG) 7 Unit(s) SubCutaneous before dinner  losartan 100 milliGRAM(s) Oral daily  metoprolol tartrate 12.5 milliGRAM(s) Oral two times a day  norepinephrine Infusion 0.05 MICROgram(s)/kG/Min (6.89 mL/Hr) IV Continuous <Continuous>  pantoprazole    Tablet 40 milliGRAM(s) Oral before breakfast  piperacillin/tazobactam IVPB.. 3.375 Gram(s) IV Intermittent every 8 hours  polyethylene glycol 3350 17 Gram(s) Oral two times a day  senna 2 Tablet(s) Oral at bedtime  sodium chloride 3%  Inhalation 4 milliLiter(s) Inhalation every 6 hours  vancomycin  IVPB      vancomycin  IVPB 500 milliGRAM(s) IV Intermittent every 8 hours    MEDICATIONS  (PRN):  bisacodyl 5 milliGRAM(s) Oral daily PRN Constipation  melatonin 5 milliGRAM(s) Oral at bedtime PRN Sleep      Vitals  Vital Signs Last 24 Hrs  T(C): 36.3 (04 Aug 2022 04:00), Max: 36.8 (03 Aug 2022 12:00)  T(F): 97.4 (04 Aug 2022 04:00), Max: 98.3 (03 Aug 2022 12:00)  HR: 69 (04 Aug 2022 06:00) (64 - 120)  BP: 120/58 (04 Aug 2022 06:00) (66/44 - 137/62)  BP(mean): 72 (04 Aug 2022 05:00) (51 - 91)  RR: 20 (04 Aug 2022 06:00) (19 - 38)  SpO2: 97% (03 Aug 2022 18:00) (94% - 99%)    Parameters below as of 04 Aug 2022 06:00  Patient On (Oxygen Delivery Method): nasal cannula  O2 Flow (L/min): 2        PHYSICAL EXAM:    NAD    Lab  CBC Full  -  ( 04 Aug 2022 04:20 )  WBC Count : 20.39 K/uL  RBC Count : 3.69 M/uL  Hemoglobin : 11.5 g/dL  Hematocrit : 34.9 %  Platelet Count - Automated : 182 K/uL  Mean Cell Volume : 94.6 fl  Mean Cell Hemoglobin : 31.2 pg  Mean Cell Hemoglobin Concentration : 33.0 gm/dL  Auto Neutrophil # : x  Auto Lymphocyte # : x  Auto Monocyte # : x  Auto Eosinophil # : x  Auto Basophil # : x  Auto Neutrophil % : x  Auto Lymphocyte % : x  Auto Monocyte % : x  Auto Eosinophil % : x  Auto Basophil % : x    08-04    137  |  100  |  41<H>  ----------------------------<  264<H>  4.3   |  24  |  1.06    Ca    8.2<L>      04 Aug 2022 04:20  Phos  5.3     08-04  Mg     1.8     08-04    TPro  5.0<L>  /  Alb  2.2<L>  /  TBili  0.4  /  DBili  x   /  AST  48<H>  /  ALT  17  /  AlkPhos  57  08-04    PT/INR - ( 04 Aug 2022 04:20 )   PT: 18.3 sec;   INR: 1.57 ratio         PTT - ( 04 Aug 2022 06:10 )  PTT:54.5 sec    Rad:    Assessment/Plan

## 2022-08-04 NOTE — PROGRESS NOTE ADULT - SUBJECTIVE AND OBJECTIVE BOX
Patient seen and examined at bedside.    --Anticoagulation--  aspirin enteric coated 81 milliGRAM(s) Oral daily  heparin  Infusion 650 Unit(s)/Hr IV Continuous <Continuous>    T(C): 36.7 (08-04-22 @ 00:00), Max: 36.8 (08-03-22 @ 12:00)  HR: 69 (08-04-22 @ 02:00) (64 - 120)  BP: 93/50 (08-04-22 @ 02:00) (66/44 - 146/63)  RR: 21 (08-04-22 @ 02:00) (19 - 38)  SpO2: 97% (08-03-22 @ 18:00) (94% - 99%)  Wt(kg): --    Exam: EOS, PERRL, no FC, DE LA GARZA AG to nox

## 2022-08-04 NOTE — PROGRESS NOTE ADULT - ASSESSMENT
[facility] 6/23/21:   s/p successful angioplasty to the mid RCA followed by intracoronary brachytherapy.  [facility] 4/14/21: POBA of mid RCA (80%)   [facility] 3/25/21: PCI/POBA to pCX 90%   Echo 7/22/22: Normal left ventricular internal dimensions with discrete upper septal hypertrophy. Estimated ejection fraction 55%, The inferolateral wall is akinetic.  Echo 8/2/22:  grossly, preserved left ventricular systolic function with segmental wall motion abnormalities. EF approximately 55%. The mid to distal inferolateral and mid to distal inferior segments are hypkinetic. The apex is akinetic. No left ventricular thrombus seen.      a/p  81 y/o female (former smoker) with PMHx of HTN, CAD sp PCi,  HLD, MI, peripheral neuropathy, DM and peripheral artery disease presented with abnl imaging on MRI as outpt      #Brain/ Lung  mass   -CT head/ CT chest noted   -work up hem/onc   -neurosx fu noted MRI brain showed 2 mets R temporal 4.7x3.7x3cm L frontal 1.4x1.5x1.2cm   -s/p bronchoscopy  -s/p Right craniotomy for tumor  -management per neuro-- started on antiplt/ hep as mentioned below- repeat CT head  8/3 noted -neurosx to fu   -eeg       #STEMI   -8/1 co cp-  found to have an inferior STEMI  -per cath lab  attending -Not a PCI candidate given potential benefits would be outweighed by the risks of the burden of anticoagulation and antiplatelet agents required for therapy  due to recent brain surgery  -now in  CICU for medical management  -on asa,  hep gtt   -limited echo grossly, preserved left ventricular systolic function with segmental wall motion abnormalities. EF approximately 55%. The mid to distal inferolateral and mid to distal inferior segments are hypkinetic. The apex is akinetic. No left ventricular thrombus seen.  -c/w statin ,  bb, arb  -ccu fu   -monitor ct head - neuro sx following     #acute PE   -c/w hep gtt     #new PAFIB   -now in NSR   -c.w BB   -ac as mentioned above     #CAD sp PCI   -antiplt/ac as mentioned above  -echo w inflat akinesis and overall preserved EF  -repeat limited echo as mentioned above   -c/w BB     #HTN  -stable, c/w meds    dvt ppx

## 2022-08-04 NOTE — PROGRESS NOTE ADULT - ASSESSMENT
ASSESSMENT:    82 year old gentlewoman, former smoker, without history of intrinsic lung disease. She has a history of HTN, HLD, DM, CAD s/p PCI (1990s and 2021) and PAD s/p RLE stenting. The patient has been having difficulty ambulating over the last several months due to a sense of imbalance with impaired gait. Her family has noted a change in her personality with pressured speech and insomnia. Outpatient cervical MRI incidentally note a CNS lesion. Lumbar spine MRI performed due to chronic back pain revealed multilevel foraminal stenosis/disc bulges. 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 The patient has no shortness of breath or hypoxemia on room air. She has no cough, sputum production, chest congestion or wheeze. No fevers, chills or sweats. No chest pain/pressure or palpitations.     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 presenting with several months of ataxia - frontal lobe lesions have lead to decreased inhibition as well as to pressured and loquacious speech    8/2 - chest pain last night -> EKG c/w STEMI - cardiac enzymes are elevated -> catheterization not performed due to recent neurosurgery -> started on ASA and heparin gtt -> now quite lethargic although awakes to state her name and the name of her children    8/4 - much more awake and alert -> A & O x 3 -> very slow speech; remains in the CCU -> EKG c/w STEMI - cardiac enzymes are elevated -> catheterization not performed due to recent neurosurgery -> continues on ASA and heparin gtt without a change in brain CT -> troponin level continues to increase; no shortness of breath or hypoxemia on a 2lpm nasal canula;       PLAN/RECOMMENDATIONS:    stable oxygenation on a 2lpm nasal canula  markedly improved mental status on vanco/zosyn for possible UTI and post-obstructive pneumonia - check MRSA nasal swab  CTA -> pulmonary embolism in the lateral segmental branch of the right middle lobe without evidence of right heart strain - multiple segmental branches in the left lung are obscured by motion - 1.2 cm left hilar lymph node - patent central airways - left upper lobe mass decreased in size - obstruction of the left apical posterior bronchus with associated segmental atelectasis along the mediastinum  spirometry    FEV1 - 1.81 liters - 88% predicted    FVC - 2.48 liters - 90% predicted    FEV1% - 73       c/w normal spirometry  mucomyst and hypertonic saline nebs to facilitate mucous clearance   patient is already on heparin gtt for ACS which will treat the pulmonary embolism  s/p bronchoscopy -> 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 - foundation studies have been ordered  cardiology evaluation noted     STEMI - not a candidate for percutaneous coronary intervention given recent craniotomy limiting anti-platelet agents    medical management including ASA/heparin gtt/lipitor/losartan/metoprolol  neurosurgery evaluation noted    s/p right craniotomy for tumor resection    repeat CT scan 8/2  -> right temporal craniotomy - minimally decreased right frontal pneumocephalus - similar subdural hemorrhage along the right lateral convexity measuring up to 7 to 8 mm in greatest depth anteriorly - extensive edema surrounding the right temporal surgical bed - leftward midline shift of 6 to 7 mm - partial effacement of the suprasellar and right perimesencephalic cisterns - mild asymmetric dilatation of the left lateral ventricle - low density lesion with mild surrounding edema in the left parasagittal frontal lobe.    prophylactic anticonvulsants -> brivaracetam    decadron 1mg q12h po for cerebral edema -> taper continues (?)    incentive spirometry as able    NGT being placed for nutritional support given dsyphagia    GI/DVT prophylaxis - protonix/heparin gtt  endocrinology evaluation noted    insulin regimen being adjusted for steroid induced hyperglycemia  radiation oncology, neurooncology, medical oncology evaluations   bowel regimen    Will follow with you. Plan of care discussed with the patient and her family at bedside, with the neurosurgery team and with the CCU staff.    Satish Hopkins MD, [facility]  [phone]  Pulmonary Medicine

## 2022-08-05 LAB
APTT BLD: 53.9 SEC — HIGH (ref 27.5–35.5)
APTT BLD: 67.5 SEC — HIGH (ref 27.5–35.5)
APTT BLD: 69.5 SEC — HIGH (ref 27.5–35.5)
APTT BLD: >200 SEC — CRITICAL HIGH (ref 27.5–35.5)
GLUCOSE BLDC GLUCOMTR-MCNC: 275 MG/DL — HIGH (ref 70–99)
GLUCOSE BLDC GLUCOMTR-MCNC: 279 MG/DL — HIGH (ref 70–99)
GLUCOSE BLDC GLUCOMTR-MCNC: 288 MG/DL — HIGH (ref 70–99)
GLUCOSE BLDC GLUCOMTR-MCNC: 350 MG/DL — HIGH (ref 70–99)

## 2022-08-05 PROCEDURE — 99233 SBSQ HOSP IP/OBS HIGH 50: CPT

## 2022-08-05 RX ORDER — HEPARIN SODIUM 5000 [USP'U]/ML
850 INJECTION INTRAVENOUS; SUBCUTANEOUS
Qty: 25000 | Refills: 0 | Status: DISCONTINUED | OUTPATIENT
Start: 2022-08-05 | End: 2022-08-06

## 2022-08-05 RX ORDER — HUMAN INSULIN 100 [IU]/ML
3 INJECTION, SUSPENSION SUBCUTANEOUS EVERY 6 HOURS
Refills: 0 | Status: DISCONTINUED | OUTPATIENT
Start: 2022-08-05 | End: 2022-08-05

## 2022-08-05 RX ORDER — ASPIRIN/CALCIUM CARB/MAGNESIUM 324 MG
81 TABLET ORAL DAILY
Refills: 0 | Status: DISCONTINUED | OUTPATIENT
Start: 2022-08-05 | End: 2022-08-10

## 2022-08-05 RX ORDER — GLUCAGON INJECTION, SOLUTION 0.5 MG/.1ML
1 INJECTION, SOLUTION SUBCUTANEOUS ONCE
Refills: 0 | Status: DISCONTINUED | OUTPATIENT
Start: 2022-08-05 | End: 2022-08-05

## 2022-08-05 RX ORDER — DEXTROSE 50 % IN WATER 50 %
15 SYRINGE (ML) INTRAVENOUS ONCE
Refills: 0 | Status: DISCONTINUED | OUTPATIENT
Start: 2022-08-05 | End: 2022-08-05

## 2022-08-05 RX ORDER — DEXTROSE 50 % IN WATER 50 %
25 SYRINGE (ML) INTRAVENOUS ONCE
Refills: 0 | Status: DISCONTINUED | OUTPATIENT
Start: 2022-08-05 | End: 2022-08-05

## 2022-08-05 RX ORDER — SODIUM CHLORIDE 9 MG/ML
1000 INJECTION, SOLUTION INTRAVENOUS
Refills: 0 | Status: DISCONTINUED | OUTPATIENT
Start: 2022-08-05 | End: 2022-08-05

## 2022-08-05 RX ORDER — PSYLLIUM SEED (WITH DEXTROSE)
1 POWDER (GRAM) ORAL EVERY 12 HOURS
Refills: 0 | Status: DISCONTINUED | OUTPATIENT
Start: 2022-08-05 | End: 2022-08-10

## 2022-08-05 RX ORDER — HUMAN INSULIN 100 [IU]/ML
5 INJECTION, SUSPENSION SUBCUTANEOUS ONCE
Refills: 0 | Status: COMPLETED | OUTPATIENT
Start: 2022-08-05 | End: 2022-08-05

## 2022-08-05 RX ORDER — HUMAN INSULIN 100 [IU]/ML
10 INJECTION, SUSPENSION SUBCUTANEOUS EVERY 6 HOURS
Refills: 0 | Status: DISCONTINUED | OUTPATIENT
Start: 2022-08-05 | End: 2022-08-05

## 2022-08-05 RX ORDER — DEXTROSE 50 % IN WATER 50 %
12.5 SYRINGE (ML) INTRAVENOUS ONCE
Refills: 0 | Status: DISCONTINUED | OUTPATIENT
Start: 2022-08-05 | End: 2022-08-05

## 2022-08-05 RX ORDER — IPRATROPIUM/ALBUTEROL SULFATE 18-103MCG
3 AEROSOL WITH ADAPTER (GRAM) INHALATION ONCE
Refills: 0 | Status: COMPLETED | OUTPATIENT
Start: 2022-08-05 | End: 2022-08-05

## 2022-08-05 RX ORDER — HUMAN INSULIN 100 [IU]/ML
6 INJECTION, SUSPENSION SUBCUTANEOUS ONCE
Refills: 0 | Status: COMPLETED | OUTPATIENT
Start: 2022-08-05 | End: 2022-08-05

## 2022-08-05 RX ORDER — HUMAN INSULIN 100 [IU]/ML
16 INJECTION, SUSPENSION SUBCUTANEOUS EVERY 6 HOURS
Refills: 0 | Status: DISCONTINUED | OUTPATIENT
Start: 2022-08-05 | End: 2022-08-06

## 2022-08-05 RX ADMIN — Medication 1 APPLICATION(S): at 05:42

## 2022-08-05 RX ADMIN — CHLORHEXIDINE GLUCONATE 1 APPLICATION(S): 213 SOLUTION TOPICAL at 12:35

## 2022-08-05 RX ADMIN — HEPARIN SODIUM 8.5 UNIT(S)/HR: 5000 INJECTION INTRAVENOUS; SUBCUTANEOUS at 05:36

## 2022-08-05 RX ADMIN — HUMAN INSULIN 10 UNIT(S): 100 INJECTION, SUSPENSION SUBCUTANEOUS at 16:05

## 2022-08-05 RX ADMIN — SODIUM CHLORIDE 4 MILLILITER(S): 9 INJECTION INTRAMUSCULAR; INTRAVENOUS; SUBCUTANEOUS at 17:35

## 2022-08-05 RX ADMIN — Medication 3 MILLILITER(S): at 09:19

## 2022-08-05 RX ADMIN — Medication 6: at 10:03

## 2022-08-05 RX ADMIN — ATORVASTATIN CALCIUM 80 MILLIGRAM(S): 80 TABLET, FILM COATED ORAL at 21:54

## 2022-08-05 RX ADMIN — Medication 1 APPLICATION(S): at 18:35

## 2022-08-05 RX ADMIN — PIPERACILLIN AND TAZOBACTAM 25 GRAM(S): 4; .5 INJECTION, POWDER, LYOPHILIZED, FOR SOLUTION INTRAVENOUS at 00:41

## 2022-08-05 RX ADMIN — PIPERACILLIN AND TAZOBACTAM 25 GRAM(S): 4; .5 INJECTION, POWDER, LYOPHILIZED, FOR SOLUTION INTRAVENOUS at 16:48

## 2022-08-05 RX ADMIN — PANTOPRAZOLE SODIUM 40 MILLIGRAM(S): 20 TABLET, DELAYED RELEASE ORAL at 16:46

## 2022-08-05 RX ADMIN — SENNA PLUS 2 TABLET(S): 8.6 TABLET ORAL at 21:54

## 2022-08-05 RX ADMIN — SODIUM CHLORIDE 4 MILLILITER(S): 9 INJECTION INTRAMUSCULAR; INTRAVENOUS; SUBCUTANEOUS at 21:55

## 2022-08-05 RX ADMIN — Medication 4 MILLILITER(S): at 03:59

## 2022-08-05 RX ADMIN — HUMAN INSULIN 16 UNIT(S): 100 INJECTION, SUSPENSION SUBCUTANEOUS at 18:38

## 2022-08-05 RX ADMIN — Medication 1 PACKET(S): at 17:24

## 2022-08-05 RX ADMIN — Medication 81 MILLIGRAM(S): at 13:06

## 2022-08-05 RX ADMIN — Medication 8: at 16:04

## 2022-08-05 RX ADMIN — HEPARIN SODIUM 8.5 UNIT(S)/HR: 5000 INJECTION INTRAVENOUS; SUBCUTANEOUS at 18:34

## 2022-08-05 RX ADMIN — BRIVARACETAM 240 MILLIGRAM(S): 25 TABLET, FILM COATED ORAL at 05:52

## 2022-08-05 RX ADMIN — LOSARTAN POTASSIUM 100 MILLIGRAM(S): 100 TABLET, FILM COATED ORAL at 05:37

## 2022-08-05 RX ADMIN — Medication 4 MILLILITER(S): at 17:36

## 2022-08-05 RX ADMIN — Medication 12.5 MILLIGRAM(S): at 05:37

## 2022-08-05 RX ADMIN — Medication 4 MILLILITER(S): at 22:26

## 2022-08-05 RX ADMIN — Medication 12.5 MILLIGRAM(S): at 18:36

## 2022-08-05 RX ADMIN — BRIVARACETAM 240 MILLIGRAM(S): 25 TABLET, FILM COATED ORAL at 18:34

## 2022-08-05 RX ADMIN — Medication 4 MILLILITER(S): at 09:17

## 2022-08-05 RX ADMIN — HUMAN INSULIN 5 UNIT(S): 100 INJECTION, SUSPENSION SUBCUTANEOUS at 16:37

## 2022-08-05 RX ADMIN — Medication 1 MILLIGRAM(S): at 18:58

## 2022-08-05 RX ADMIN — GABAPENTIN 200 MILLIGRAM(S): 400 CAPSULE ORAL at 21:54

## 2022-08-05 RX ADMIN — Medication 1 MILLIGRAM(S): at 05:36

## 2022-08-05 RX ADMIN — SODIUM CHLORIDE 4 MILLILITER(S): 9 INJECTION INTRAMUSCULAR; INTRAVENOUS; SUBCUTANEOUS at 09:17

## 2022-08-05 RX ADMIN — HUMAN INSULIN 10 UNIT(S): 100 INJECTION, SUSPENSION SUBCUTANEOUS at 10:00

## 2022-08-05 RX ADMIN — Medication 6: at 03:53

## 2022-08-05 RX ADMIN — HUMAN INSULIN 3 UNIT(S): 100 INJECTION, SUSPENSION SUBCUTANEOUS at 04:24

## 2022-08-05 RX ADMIN — PIPERACILLIN AND TAZOBACTAM 25 GRAM(S): 4; .5 INJECTION, POWDER, LYOPHILIZED, FOR SOLUTION INTRAVENOUS at 09:13

## 2022-08-05 RX ADMIN — SODIUM CHLORIDE 4 MILLILITER(S): 9 INJECTION INTRAMUSCULAR; INTRAVENOUS; SUBCUTANEOUS at 03:59

## 2022-08-05 NOTE — EEG REPORT - NS EEG TEXT BOX
LANIE BERTRAND [id number]     Study Date: 08-04-22 8 AM to 8/4/22 6PM  Duration (hrs): 10hrs   No Fp1/Fp2  --------------------------------------------------------------------------------------------------  History:  CC/ HPI Patient is a 82y old  Female who presents with a chief complaint of abnormal imaging (04 Aug 2022 12:55)    MEDICATIONS  (STANDING):  acetylcysteine 10%  Inhalation 4 milliLiter(s) Inhalation every 6 hours  aspirin enteric coated 81 milliGRAM(s) Oral daily  atorvastatin 80 milliGRAM(s) Oral at bedtime  BACItracin   Ointment 1 Application(s) Topical two times a day  brivaracetam  IVPB 100 milliGRAM(s) IV Intermittent two times a day  dexAMETHasone  Injectable 1 milliGRAM(s) IV Push every 12 hours  gabapentin 200 milliGRAM(s) Oral at bedtime  heparin  Infusion 750 Unit(s)/Hr (7.5 mL/Hr) IV Continuous <Continuous>  insulin glargine Injectable (LANTUS) 10 Unit(s) SubCutaneous at bedtime  insulin lispro (ADMELOG) corrective regimen sliding scale   SubCutaneous Before meals and at bedtime  losartan 100 milliGRAM(s) Oral daily  metoprolol tartrate 12.5 milliGRAM(s) Oral two times a day  pantoprazole    Tablet 40 milliGRAM(s) Oral before breakfast  piperacillin/tazobactam IVPB.. 3.375 Gram(s) IV Intermittent every 8 hours  polyethylene glycol 3350 17 Gram(s) Oral two times a day  senna 2 Tablet(s) Oral at bedtime  sodium chloride 3%  Inhalation 4 milliLiter(s) Inhalation every 6 hours  vancomycin  IVPB      vancomycin  IVPB 500 milliGRAM(s) IV Intermittent every 8 hours    --------------------------------------------------------------------------------------------------  Study Interpretation:    [Abbreviation Key:  PDR=alpha rhythm/posterior dominant rhythm. A-P=anterior posterior.  Amplitude: ‘very low’:<20; ‘low’:20-49; ‘medium’:; ‘high’:>150uV.  Persistence for periodic/rhythmic patterns (% of epoch) ‘rare’:<1%; ‘occasional’:1-10%; ‘frequent’:10-50%; ‘abundant’:50-90%; ‘continuous’:>90%.  Persistence for sporadic discharges: ‘rare’:<1/hr; ‘occasional’:1/min-1/hr; ‘frequent’:>1/min; ‘abundant’:>1/10 sec.  RPP=rhythmic and periodic patterns; GRDA=generalized rhythmic delta activity; FIRDA=frontal intermittent GRDA; LRDA=lateralized rhythmic delta activity; TIRDA=temporal intermittent rhythmic delta activity;  LPD=PLED=lateralized periodic discharges; GPD=generalized periodic discharges; BIPDs =bilateral independent periodic discharges; Mf=multifocal; SIRPDs=stimulus induced rhythmic, periodic, or ictal appearing discharges; BIRDs=brief potentially ictal rhythmic discharges >4 Hz, lasting .5-10s; PFA (paroxysmal bursts >13 Hz or =8 Hz <10s).  Modifiers: +F=with fast component; +S=with spike component; +R=with rhythmic component.  S-B=burst suppression pattern.  Max=maximal. N1-drowsy; N2-stage II sleep; N3-slow wave sleep. SSS/BETS=small sharp spikes/benign epileptiform transients of sleep. HV=hyperventilation; PS=photic stimulation]    Daily EEG Visual Analysis  FINDINGS:      Background:  Continuous: continuous  Symmetry: asymmetric  PDR: 5-6 Hz during maximal wakefulness   Reactivity: present  Voltage: low ( 20-49uV)  Anterior Posterior Gradient: present  Other background findings: none  Breach: absent    Background Slowing:  Generalized slowing: generalized delta slowing  Focal slowing: right temporal delta slowing     State Changes:   -N2 sleep transients were not recorded.    Sporadic Epileptiform Discharges:    Frequent sharp waves over the right midtemporal region     Rhythmic and Periodic Patterns (RPPs):  None     Electrographic and Electroclinical seizures:  None    Other Clinical Events:  None    Activation Procedures:   -Hyperventilation was not performed.    -Photic stimulation was not performed.     Artifacts:  Intermittent myogenic and movement artifacts were noted.    ECG:  The heart rate on single channel ECG was predominantly between 60-70 BPM.    EEG Classification / Summary:  Abnormal EEG study  Frequent sharp waves over the right midtemporal region   Focal right temporal delta slowing   Moderate to severe generalized background slowing    -----------------------------------------------------------------------------------------------------    Clinical Impression:  Risk of seizures from the right temporal region   Focal right temporal cerebral dysfunction   Moderate to severe diffuse/multifocal cerebral dysfunction, not specific as to etiology.    Preliminary report    Samy Lowery MD  Epilepsy Fellow , [facility]  Department of Neurology, [employer]    [facility] EEG Reading Room Ph#: [phone]  Epilepsy Answering Service after 5PM and before 8:30AM: Ph#: [phone]

## 2022-08-05 NOTE — PROGRESS NOTE ADULT - ASSESSMENT
-POD 9 from Right craniotomy for tumor resection  -Levo to keep -160, MAP >65  -Continue Zosyn and vanc  -Continue hep gtt  -Dex 2BID

## 2022-08-05 NOTE — PROGRESS NOTE ADULT - ASSESSMENT
ASSESSMENT:    83 y/o female (former smoker) with PMHx of HTN, CAD, HLD, MI, peripheral neuropathy, DM and peripheral artery disease presented to the ED for imaging for her head due to abnormal spine MRI by her orthopedist (Dr. Castro) during workup for her spine for her chronic back problems. The orthopedist noted something in her brain. Otherwise pt has no symptoms, stated that since 04/2022 she has had gait/balance issues. Endorsed that if she sits for a while and gets up, she feels like she is "drunk" and feels off. Denied chest pain, SOB, fevers, chills, numbness/paresthesias, muscular weakness, dizziness, abdominal pain, headaches.     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; No O2 requirements, pulmonary on board  Incentive spirometry, mobilize as tolerated  on 4L NC, wean as tolerated  small incidental PE  Repeat CXR    CV:  Keep -160mmHg (MAP >65 in the s/o sepsis)  EF 55%  STEMI being treated medically, cont hep gtt goal 55-65 monitor ptt, cont ASA  cardiology following  cont losartan  cont lopressor  new onset afib  levo PRN    RENAL: borderline PERCY w high BUN, continue hydration if ok w cardiology keep net even to 500cc+  cont straight caths, bladder [id number]  See ID for UA+  check ur lytes    GI: reg diet  GI prophylaxis x PPI [] other:  Bowel regimen standing    ENDO: Endo following  outpt FU for adrenal and thyroid abnormalities  ISS, premeals and lantus 20U  Goal euglycemia (-180)    HEME/ONC: hep gtt  small RML PE on CTA today, on hep gtt, if any worsening O2 requirements, consult cardio interventional  SCDs    ID: on zosyn, 5 day course  WBC high, could be steroid induced hyperglycemia vs new UTI  FU blood and urine cultures  consult ID  vanc empricically  MRSA swab      MISC:    SOCIAL/FAMILY:  [] awaiting [x] updated at bedside [] family meeting    CODE STATUS:  [x] Full Code [] DNR [] DNI [] Palliative/Comfort Care    DISPOSITION:  [x] ICU [] Stroke Unit [] Floor [] EMU [] RCU [] PCU    [] Patient is at high risk of neurologic deterioration/death due to:     Time seen:  Time spent: ___ [] critical care minutes    Contact: [phone] ASSESSMENT:    83 y/o female (former smoker) with PMHx of HTN, CAD, HLD, MI, peripheral neuropathy, DM and peripheral artery disease presented to the ED for imaging for her head due to abnormal spine MRI by her orthopedist (Dr. Castro) during workup for her spine for her chronic back problems. The orthopedist noted something in her brain. Otherwise pt has no symptoms, stated that since 04/2022 she has had gait/balance issues. Endorsed that if she sits for a while and gets up, she feels like she is "drunk" and feels off. Denied chest pain, SOB, fevers, chills, numbness/paresthesias, muscular weakness, dizziness, abdominal pain, headaches.     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; No O2 requirements, pulmonary on board  Incentive spirometry, mobilize as tolerated  on 2L NC, wean as tolerated  small incidental PE    CV:  Keep -160mmHg (MAP >65 in the s/o sepsis)  EF 55%  STEMI being treated medically, cont hep gtt goal 55-65 monitor ptt, cont ASA  cardiology following  cont losartan  cont lopressor  new onset afib  off levo    RENAL: borderline PERCY w high BUN, continue hydration if ok w cardiology keep net even to 500cc+  cont straight caths, bladder [id number]  See ID for UA+      GI: NGT w TF  Repeat S&S eval  GI prophylaxis x PPI while on steroids  Bowel regimen standing    ENDO: Endo following  outpt FU for adrenal and thyroid abnormalities  ISS, NPH will adjust based in sugars  Goal euglycemia (-180)    HEME/ONC: hep gtt  small RML PE on CTA today  SCDs    ID: on zosyn, 5 day course  WBC high, could be steroid induced hyperglycemia vs new UTI  FU blood and urine cultures  consult ID  vanc empricically  MRSA swab      MISC:    SOCIAL/FAMILY:  [] awaiting [x] updated at bedside [] family meeting    CODE STATUS:  [x] Full Code [] DNR [] DNI [] Palliative/Comfort Care    DISPOSITION:  [] ICU [] Stroke Unit [x] Floor [] EMU [] RCU [] PCU    [] Patient is at high risk of neurologic deterioration/death due to:     Time seen:  Time spent: ___ [] critical care minutes    Contact: [phone] ASSESSMENT:    83 y/o female (former smoker) with PMHx of HTN, CAD, HLD, MI, peripheral neuropathy, DM and peripheral artery disease presented to the ED for imaging for her head due to abnormal spine MRI by her orthopedist (Dr. Castro) during workup for her spine for her chronic back problems. The orthopedist noted something in her brain. Otherwise pt has no symptoms, stated that since 04/2022 she has had gait/balance issues. Endorsed that if she sits for a while and gets up, she feels like she is "drunk" and feels off. Denied chest pain, SOB, fevers, chills, numbness/paresthesias, muscular weakness, dizziness, abdominal pain, headaches.     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; No O2 requirements, pulmonary on board  Incentive spirometry, mobilize as tolerated  on 2L NC, wean as tolerated  small incidental PE    CV:  Keep -160mmHg (MAP >65 in the s/o sepsis)  EF 55%  STEMI being treated medically, cont hep gtt goal 55-65 monitor ptt, cont ASA  cardiology following  cont losartan  cont lopressor  new onset afib  off levo    RENAL: borderline PERYC w high BUN, continue hydration if ok w cardiology keep net even to 500cc+  cont straight caths, bladder [id number]  See ID for UA+      GI: NGT w TF  Repeat S&S eval  GI prophylaxis x PPI while on steroids  Bowel regimen standing    ENDO: Endo following  outpt FU for adrenal and thyroid abnormalities  ISS, NPH will adjust based in sugars  Goal euglycemia (-180)    HEME/ONC: hep gtt  small RML PE on CTA today  SCDs    ID: on zosyn, 5 day course  WBC high, could be steroid induced hyperglycemia vs new UTI  FU blood and urine cultures  consult ID  vanc empricically  MRSA swab          CODE STATUS:  [x] Full Code [] DNR [] DNI [] Palliative/Comfort Care    DISPOSITION:  [] ICU [] Stroke Unit [x] Floor [] EMU [] RCU [] PCU      Contact: [phone]

## 2022-08-05 NOTE — PROGRESS NOTE ADULT - SUBJECTIVE AND OBJECTIVE BOX
DATE OF SERVICE: 08-05-22 @ 13:39    Patient is a 82y old  Female who presents with a chief complaint of abnormal imaging (05 Aug 2022 13:04)      SUBJECTIVE / OVERNIGHT EVENTS:  seen and examined in sicu    MEDICATIONS  (STANDING):  acetylcysteine 10%  Inhalation 4 milliLiter(s) Inhalation every 6 hours  aspirin  chewable 81 milliGRAM(s) Oral daily  atorvastatin 80 milliGRAM(s) Oral at bedtime  BACItracin   Ointment 1 Application(s) Topical two times a day  brivaracetam  IVPB 100 milliGRAM(s) IV Intermittent two times a day  chlorhexidine 4% Liquid 1 Application(s) Topical daily  dexAMETHasone  Injectable 1 milliGRAM(s) IV Push every 12 hours  gabapentin 200 milliGRAM(s) Oral at bedtime  heparin  Infusion 800 Unit(s)/Hr (8.5 mL/Hr) IV Continuous <Continuous>  insulin lispro (ADMELOG) corrective regimen sliding scale   SubCutaneous every 6 hours  insulin NPH human recombinant 10 Unit(s) SubCutaneous every 6 hours  losartan 100 milliGRAM(s) Oral daily  metoprolol tartrate 12.5 milliGRAM(s) Oral two times a day  pantoprazole  Injectable 40 milliGRAM(s) IV Push every 24 hours  piperacillin/tazobactam IVPB.. 3.375 Gram(s) IV Intermittent every 8 hours  psyllium Powder 1 Packet(s) Enteral Tube every 12 hours  senna 2 Tablet(s) Oral at bedtime  sodium chloride 3%  Inhalation 4 milliLiter(s) Inhalation every 6 hours    MEDICATIONS  (PRN):  melatonin 5 milliGRAM(s) Oral at bedtime PRN Sleep      Vital Signs Last 24 Hrs  T(C): 37 (05 Aug 2022 11:00), Max: 37 (05 Aug 2022 11:00)  T(F): 98.6 (05 Aug 2022 11:00), Max: 98.6 (05 Aug 2022 11:00)  HR: 78 (05 Aug 2022 13:00) (49 - 78)  BP: 106/59 (05 Aug 2022 13:00) (87/44 - 174/69)  BP(mean): 77 (05 Aug 2022 13:00) (60 - 99)  RR: 23 (05 Aug 2022 13:00) (17 - 29)  SpO2: 100% (05 Aug 2022 13:00) (97% - 100%)    Parameters below as of 05 Aug 2022 09:27  Patient On (Oxygen Delivery Method): room air      CAPILLARY BLOOD GLUCOSE      POCT Blood Glucose.: 288 mg/dL (05 Aug 2022 09:50)  POCT Blood Glucose.: 275 mg/dL (05 Aug 2022 03:36)  POCT Blood Glucose.: 305 mg/dL (04 Aug 2022 22:21)  POCT Blood Glucose.: 186 mg/dL (04 Aug 2022 17:32)    I&O's Summary    04 Aug 2022 07:01  -  05 Aug 2022 07:00  --------------------------------------------------------  IN: 1386 mL / OUT: 200 mL / NET: 1186 mL    05 Aug 2022 07:01  -  05 Aug 2022 13:39  --------------------------------------------------------  IN: 511 mL / OUT: 0 mL / NET: 511 mL        PHYSICAL EXAM:  GENERAL: NAD, well-developed  HEAD:  Atraumatic, Normocephalic  EYES: EOMI, PERRLA, conjunctiva and sclera clear  NECK: Supple, No JVD  CHEST/LUNG: Clear to auscultation bilaterally; No wheeze  HEART: Regular rate and rhythm; No murmurs, rubs, or gallops  ABDOMEN: Soft, Nontender, Nondistended; Bowel sounds present  EXTREMITIES:  2+ Peripheral Pulses, No clubbing, cyanosis, or edema  PSYCH: AAOx3  NEUROLOGY: non-focal  SKIN: No rashes or lesions    LABS:                        14.0   16.18 )-----------( 229      ( 04 Aug 2022 21:53 )             44.3     08-04    135  |  99  |  42<H>  ----------------------------<  274<H>  4.7   |  20<L>  |  1.05    Ca    9.3      04 Aug 2022 21:53  Phos  4.0     08-04  Mg     2.4     08-04    TPro  6.7  /  Alb  3.0<L>  /  TBili  0.5  /  DBili  x   /  AST  37  /  ALT  22  /  AlkPhos  79  08-04    PT/INR - ( 04 Aug 2022 21:53 )   PT: 11.4 sec;   INR: 0.99 ratio         PTT - ( 05 Aug 2022 11:53 )  PTT:69.5 sec  CARDIAC MARKERS ( 04 Aug 2022 04:20 )  x     / x     / 265 U/L / x     / 18.9 ng/mL          RADIOLOGY & ADDITIONAL TESTS:    Imaging Personally Reviewed:    Consultant(s) Notes Reviewed:      Care Discussed with Consultants/Other Providers:

## 2022-08-05 NOTE — PROGRESS NOTE ADULT - SUBJECTIVE AND OBJECTIVE BOX
CC: f/u for possible pneumonia    Patient reports: she is alert, seems a little confused, frequent BM's but not loose.    REVIEW OF SYSTEMS:  All other review of systems negative (Comprehensive ROS)    Antimicrobials Day #  :day 3, s/p vanco  piperacillin/tazobactam IVPB.. 3.375 Gram(s) IV Intermittent every 8 hours    Other Medications Reviewed  MEDICATIONS  (STANDING):  acetylcysteine 10%  Inhalation 4 milliLiter(s) Inhalation every 6 hours  aspirin enteric coated 81 milliGRAM(s) Oral daily  atorvastatin 80 milliGRAM(s) Oral at bedtime  BACItracin   Ointment 1 Application(s) Topical two times a day  brivaracetam  IVPB 100 milliGRAM(s) IV Intermittent two times a day  chlorhexidine 4% Liquid 1 Application(s) Topical daily  dexAMETHasone  Injectable 1 milliGRAM(s) IV Push every 12 hours  gabapentin 200 milliGRAM(s) Oral at bedtime  heparin  Infusion 800 Unit(s)/Hr (8.5 mL/Hr) IV Continuous <Continuous>  insulin lispro (ADMELOG) corrective regimen sliding scale   SubCutaneous every 6 hours  insulin NPH human recombinant 10 Unit(s) SubCutaneous every 6 hours  losartan 100 milliGRAM(s) Oral daily  metoprolol tartrate 12.5 milliGRAM(s) Oral two times a day  pantoprazole  Injectable 40 milliGRAM(s) IV Push every 24 hours  piperacillin/tazobactam IVPB.. 3.375 Gram(s) IV Intermittent every 8 hours  psyllium Powder 1 Packet(s) Enteral Tube every 12 hours  senna 2 Tablet(s) Oral at bedtime  sodium chloride 3%  Inhalation 4 milliLiter(s) Inhalation every 6 hours    T(F): 98.4 (08-05-22 @ 07:00), Max: 98.4 (08-05-22 @ 07:00)  HR: 72 (08-05-22 @ 10:00)  BP: 116/60 (08-05-22 @ 10:00)  RR: 24 (08-05-22 @ 10:00)  SpO2: 98% (08-05-22 @ 10:00)  Wt(kg): --    PHYSICAL EXAM:  General: alert, no acute distress, forehead abrasion  Eyes:  anicteric, no conjunctival injection, no discharge  Oropharynx: no lesions or injection 	  Neck: supple, without adenopathy  Lungs: clear to auscultation  Heart: regular rate and rhythm; no murmur, rubs or gallops  Abdomen: soft, nondistended, nontender, without mass or organomegaly  Skin: no lesions  Extremities: no clubbing, cyanosis, or edema  Neurologic: alert, oriented, moves all extremities    LAB RESULTS:                        14.0   16.18 )-----------( 229      ( 04 Aug 2022 21:53 )             44.3     08-04    135  |  99  |  42<H>  ----------------------------<  274<H>  4.7   |  20<L>  |  1.05    Ca    9.3      04 Aug 2022 21:53  Phos  4.0     08-04  Mg     2.4     08-04    TPro  6.7  /  Alb  3.0<L>  /  TBili  0.5  /  DBili  x   /  AST  37  /  ALT  22  /  AlkPhos  79  08-04    LIVER FUNCTIONS - ( 04 Aug 2022 21:53 )  Alb: 3.0 g/dL / Pro: 6.7 g/dL / ALK PHOS: 79 U/L / ALT: 22 U/L / AST: 37 U/L / GGT: x             MICROBIOLOGY:  RECENT CULTURES:  08-03 @ 13:05 Catheterized Catheterized     >100,000 CFU/ml Escherichia coli  >100,000 CFU/ml Klebsiella pneumoniae      08-03 @ 10:59 .Blood Blood     No growth to date.      08-03 @ 10:58 .Blood Blood     No growth to date.          RADIOLOGY REVIEWED:  < from: US Abdomen Complete (US Abdomen Complete .) (08.04.22 @ 15:00) >  IMPRESSION:    Mildly distended gallbladder. No evidence of acute cholecystitis.    Possible mild hydronephrosis.    --- End of Report ---    < end of copied text >  < from: CT Angio Chest PE Protocol w/ IV Cont (08.03.22 @ 09:59) >  IMPRESSION:  Pulmonary embolism in the lateral segmental branch of the right middle   lobe. No evidence of right heart strain.    Interval decrease in left upper lobe mass. Unchanged obstruction of the   posterior bronchus, and stable fibroglandular lymph node.    COMMUNICATION:  Discussed positive PE findings with [facility] DAVID Poon by Dr. Joycelyn Rebollar at 12:15 PM on 8/3/2022.    < end of copied text >  < from: CT Head No Cont (08.03.22 @ 04:38) >  IMPRESSION:    Redemonstration of right temporal craniotomy for prior resection of a   lesion. There is a large degree of hypoattenuation throughout the right   temporal lobe and extending into the right basal ganglia and right   parietal lobe, suggestive of a combination of vasogenic edema and/or   encephalomalacia/gliosis. This results in secondary mass effect upon the   parenchyma of the right cerebral hemisphere, not significantly changed.   There is mild mass effect on the ventricular system, right greater than   left. There is secondary right to left midline shift of approximately 5.7   mm. Expected postsurgical extra-axial pneumocephalus is reidentified   without significant change. Tiny expected subdural hematoma overlying the   right anterior superior frontal lobe is reidentified without change.    Reidentified is a hypoattenuating lesion within the left medial frontal   lobe measuring 1.4 x 1.6 cm, compatible with known additional metastasis.   No significant surrounding edema.    < end of copied text >

## 2022-08-05 NOTE — SWALLOW BEDSIDE ASSESSMENT ADULT - SLP GENERAL OBSERVATIONS
Patient encountered at bedside with daughter present. NGT in place. Per daughter, patient had been tolerating regular diet, but then declined in function following NSTEMI and UTI, with overlaying worsening cognitive status and speech. IVs and telemonitoring in place. NC in place for 2 L supplemental oxygen. Patient oriented to person, type of place but with some confusion. Patient's speech dysarthric and patient with marked confusion during basic conversations requiring redirecting and cues for topic maintenance.

## 2022-08-05 NOTE — PROGRESS NOTE ADULT - SUBJECTIVE AND OBJECTIVE BOX
SUMMARY:  81 y/o female (former smoker) with PMHx of HTN, CAD, HLD, MI, peripheral neuropathy, DM and peripheral artery disease presented to the ED for imaging for her head due to abnormal spine MRI by her orthopedist (Dr. Castro) during workup for her spine for her chronic back problems. The orthopedist noted something in her brain. Otherwise pt has no symptoms, stated that since 04/2022 she has had gait/balance issues. Endorsed that if she sits for a while and gets up, she feels like she is "drunk" and feels off. Denied chest pain, SOB, fevers, chills, numbness/paresthesias, muscular weakness, dizziness, abdominal pain, headaches. MR head significant for R temporal lobe lesion with edema, midline shift and uncal herniation concerning for metastatic disease. 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     HOSPITAL COURSE: R crani for metastatic lesion 7/27 consistent w metastatic non small cell lung cancer, residual lesion on L side of brain. Course complicated on the hospital floors by inferolateral STEMI 8/1, pt transferred to CICU and started on hep gtt no bolus and ASA, no cath given high risk from comorbidities and recent surgery.     24 HOUR EVENTS:   - no sz on eeg, dc'ed  -mental status significantly improved  - on zosyn for uti      REVIEW OF SYSTEMS: Denies any symptoms    ALLERGIES: Allergies        VITALS/DATA/ORDERS: [x] Reviewed      EXAMINATION:  General: No acute distress, conversational  HEENT: Anicteric sclerae  Cardiac: [id number]  Lungs: Clear  Abdomen: Soft, non-tender, +BS  Extremities: No c/c/e  Skin/Incision Site: Clean, dry and intact  Neurologic: AOx3, fc, no facial, [place] GARZA spont/ag/purposeful

## 2022-08-05 NOTE — PROGRESS NOTE ADULT - SUBJECTIVE AND OBJECTIVE BOX
CARDIOLOGY FOLLOW UP - Dr. Alcaraz  DATE OF SERVICE: 8/5/22    CC transferred to SICU   -alert but appears confuse at times   -family at bedside     REVIEW OF SYSTEMS:  limited no CP OR  sob     PHYSICAL EXAM:  T(C): 37 (08-05-22 @ 11:00), Max: 37 (08-05-22 @ 11:00)  HR: 72 (08-05-22 @ 11:00) (49 - 77)  BP: 87/44 (08-05-22 @ 11:00) (87/44 - 174/69)  RR: 25 (08-05-22 @ 11:00) (17 - 28)  SpO2: 98% (08-05-22 @ 11:00) (97% - 100%)  Wt(kg): --  I&O's Summary    04 Aug 2022 07:01  -  05 Aug 2022 07:00  --------------------------------------------------------  IN: 1386 mL / OUT: 200 mL / NET: 1186 mL    05 Aug 2022 07:01  -  05 Aug 2022 13:05  --------------------------------------------------------  IN: 315.5 mL / OUT: 0 mL / NET: 315.5 mL        Appearance: Normal	  Cardiovascular: Normal S1 S2,RRR  Respiratory: Lungs clear to auscultation	  Gastrointestinal:  Soft, Non-tender, + BS	  Extremities: Normal range of motion, No clubbing, cyanosis or edema      Home Medications:  acetaminophen 325 mg oral tablet: 2 tab(s) orally every 6 hours, As needed, Temp greater or equal to 38C (100.4F), Mild Pain (1 - 3) (01 Aug 2022 13:52)  amLODIPine 5 mg oral tablet: 1 tab(s) orally once a day (01 Aug 2022 13:52)  atorvastatin 40 mg oral tablet: 1 tab(s) orally once a day (at bedtime) (01 Aug 2022 13:52)  bisacodyl 5 mg oral delayed release tablet: 1 tab(s) orally once a day, As needed, Constipation (01 Aug 2022 13:52)  dexamethasone 2 mg oral tablet: 1 tab po every 8 hours for 2 days, then 1 tab po q12 hours x2 days, then 1 tab po daily x3 days, then stop (01 Aug 2022 13:52)  divalproex sodium 500 mg oral delayed release tablet: 1 tab(s) orally every 8 hours (01 Aug 2022 13:52)  enoxaparin: 40 milligram(s) subcutaneous once a day (at bedtime) (01 Aug 2022 13:52)  gabapentin 100 mg oral capsule: 2 cap(s) orally once a day (at bedtime) (01 Aug 2022 13:52)  insulin glargine 100 units/mL subcutaneous solution: 15 unit(s) subcutaneous once a day (at bedtime) (01 Aug 2022 13:52)  insulin lispro 100 units/mL injectable solution: 5 unit(s) subcutaneous once a day before lunch (01 Aug 2022 13:52)  insulin lispro 100 units/mL injectable solution: 7 unit(s) subcutaneous once a day before breakfast (01 Aug 2022 13:52)  insulin lispro 100 units/mL injectable solution: 7 unit(s) subcutaneous once a day before dinner (01 Aug 2022 13:52)  insulin lispro 100 units/mL injectable solution: moderate dose sliding scale as per hospital/facility protocol, 3 times a day (before meals) (01 Aug 2022 13:52)  losartan 100 mg oral tablet: 1 tab(s) orally once a day (01 Aug 2022 13:52)  melatonin 5 mg oral tablet: 1 tab(s) orally once a day (at bedtime), As needed, Sleep (01 Aug 2022 13:52)  metoprolol tartrate 25 mg oral tablet: 1 tab(s) orally 2 times a day (01 Aug 2022 13:52)  oxyCODONE 10 mg oral tablet: 1 tab(s) orally every 4 hours, As needed, Severe Pain (7 - 10) (01 Aug 2022 13:52)  oxyCODONE 5 mg oral tablet: 1 tab(s) orally every 4 hours, As needed, Moderate Pain (4 - 6) (01 Aug 2022 13:52)  pantoprazole 40 mg oral delayed release tablet: 1 tab(s) orally once a day (before a meal) (01 Aug 2022 13:52)  polyethylene glycol 3350 oral powder for reconstitution: 17 gram(s) orally 2 times a day (01 Aug 2022 13:52)  senna leaf extract oral tablet: 2 tab(s) orally once a day (at bedtime) (01 Aug 2022 13:52)      MEDICATIONS  (STANDING):  acetylcysteine 10%  Inhalation 4 milliLiter(s) Inhalation every 6 hours  aspirin  chewable 81 milliGRAM(s) Oral daily  atorvastatin 80 milliGRAM(s) Oral at bedtime  BACItracin   Ointment 1 Application(s) Topical two times a day  brivaracetam  IVPB 100 milliGRAM(s) IV Intermittent two times a day  chlorhexidine 4% Liquid 1 Application(s) Topical daily  dexAMETHasone  Injectable 1 milliGRAM(s) IV Push every 12 hours  gabapentin 200 milliGRAM(s) Oral at bedtime  heparin  Infusion 800 Unit(s)/Hr (8.5 mL/Hr) IV Continuous <Continuous>  insulin lispro (ADMELOG) corrective regimen sliding scale   SubCutaneous every 6 hours  insulin NPH human recombinant 10 Unit(s) SubCutaneous every 6 hours  losartan 100 milliGRAM(s) Oral daily  metoprolol tartrate 12.5 milliGRAM(s) Oral two times a day  pantoprazole  Injectable 40 milliGRAM(s) IV Push every 24 hours  piperacillin/tazobactam IVPB.. 3.375 Gram(s) IV Intermittent every 8 hours  psyllium Powder 1 Packet(s) Enteral Tube every 12 hours  senna 2 Tablet(s) Oral at bedtime  sodium chloride 3%  Inhalation 4 milliLiter(s) Inhalation every 6 hours      TELEMETRY: nsr	    ECG:  	  RADIOLOGY:   DIAGNOSTIC TESTING:  [ ] Echocardiogram:  [ ]  Catheterization:  [ ] Stress Test:    OTHER: 	    LABS:	 	    Troponin T, High Sensitivity Result: 1633 ng/L [0 - 51] (08-04 @ 21:53)  Troponin T, High Sensitivity Result: 1535 ng/L [0 - 51] (08-04 @ 15:08)  Troponin T, High Sensitivity Result: 1971 ng/L [0 - 51] (08-04 @ 04:20)  Creatine Kinase, Serum: 265 U/L [25 - 170] (08-04 @ 04:20)  CKMB Units: 18.9 ng/mL [0.0 - 3.8] (08-04 @ 04:20)  Creatine Kinase, Serum: 326 U/L [25 - 170] (08-03 @ 08:57)  CKMB Units: 29.3 ng/mL [0.0 - 3.8] (08-03 @ 08:57)  Troponin T, High Sensitivity Result: 1121 ng/L [0 - 51] (08-03 @ 08:57)  Creatine Kinase, Serum: 355 U/L [25 - 170] (08-03 @ 07:45)  CKMB Units: 32.0 ng/mL [0.0 - 3.8] (08-03 @ 07:45)  Troponin T, High Sensitivity Result: 1120 ng/L [0 - 51] (08-03 @ 07:45)  CKMB Units: 63.2 ng/mL [0.0 - 3.8] (08-02 @ 17:12)  Creatine Kinase, Serum: 466 U/L [25 - 170] (08-02 @ 17:12)  Troponin T, High Sensitivity Result: 948 ng/L [0 - 51] (08-02 @ 17:12)  Creatine Kinase, Serum: 470 U/L [25 - 170] (08-02 @ 11:09)  CKMB Units: 72.7 ng/mL [0.0 - 3.8] (08-02 @ 11:09)  Troponin T, High Sensitivity Result: 961 ng/L [0 - 51] (08-02 @ 11:09)  CKMB Units: 57.1 ng/mL [0.0 - 3.8] (08-02 @ 04:07)  Creatine Kinase, Serum: 386 U/L [25 - 170] (08-02 @ 04:07)  Troponin T, High Sensitivity Result: 622 ng/L [0 - 51] (08-02 @ 04:07)  CKMB Units: 19.7 ng/mL [0.0 - 3.8] (08-01 @ 20:29)  Creatine Kinase, Serum: 171 U/L [25 - 170] (08-01 @ 20:29)  Troponin T, High Sensitivity Result: 285 ng/L [0 - 51] (08-01 @ 20:29)  Troponin T, High Sensitivity Result: 22 ng/L [0 - 51] (08-01 @ 15:26)  CKMB Units: 2.0 ng/mL [0.0 - 3.8] (08-01 @ 15:26)  Creatine Kinase, Serum: 78 U/L [25 - 170] (08-01 @ 15:26)                          14.0   16.18 )-----------( 229      ( 04 Aug 2022 21:53 )             44.3     08-04    135  |  99  |  42<H>  ----------------------------<  274<H>  4.7   |  20<L>  |  1.05    Ca    9.3      04 Aug 2022 21:53  Phos  4.0     08-04  Mg     2.4     08-04    TPro  6.7  /  Alb  3.0<L>  /  TBili  0.5  /  DBili  x   /  AST  37  /  ALT  22  /  AlkPhos  79  08-04    PT/INR - ( 04 Aug 2022 21:53 )   PT: 11.4 sec;   INR: 0.99 ratio         PTT - ( 05 Aug 2022 11:53 )  PTT:69.5 sec

## 2022-08-05 NOTE — PROGRESS NOTE ADULT - ASSESSMENT
[facility] 6/23/21:   s/p successful angioplasty to the mid RCA followed by intracoronary brachytherapy.  [facility] 4/14/21: POBA of mid RCA (80%)   [facility] 3/25/21: PCI/POBA to pCX 90%   Echo 7/22/22: Normal left ventricular internal dimensions with discrete upper septal hypertrophy. Estimated ejection fraction 55%, The inferolateral wall is akinetic.  Echo 8/2/22:  grossly, preserved left ventricular systolic function with segmental wall motion abnormalities. EF approximately 55%. The mid to distal inferolateral and mid to distal inferior segments are hypkinetic. The apex is akinetic. No left ventricular thrombus seen.      a/p  83 y/o female (former smoker) with PMHx of HTN, CAD sp PCi,  HLD, MI, peripheral neuropathy, DM and peripheral artery disease presented with abnl imaging on MRI as outpt      #Brain/ Lung  mass   -CT head/ CT chest noted   -work up hem/onc   -neurosx fu noted MRI brain showed 2 mets R temporal 4.7x3.7x3cm L frontal 1.4x1.5x1.2cm   -s/p bronchoscopy  -s/p Right craniotomy for tumor  -management per neuro-- started on antiplt/ hep as mentioned below- repeat CT head  8/3 noted -neurosx to fu   -eeg fu per neuro      #STEMI   -8/1 co cp-  found to have an inferior STEMI  -per cath lab  attending -Not a PCI candidate given potential benefits would be outweighed by the risks of the burden of anticoagulation and antiplatelet agents required for therapy  due to recent brain surgery  -sp  CICU for medical management  -on asa,  c/w hep gtt   -limited echo grossly, preserved left ventricular systolic function with segmental wall motion abnormalities. EF approximately 55%. The mid to distal inferolateral and mid to distal inferior segments are hypkinetic. The apex is akinetic. No left ventricular thrombus seen.  -c/w statin ,  bb, arb  -monitor ct head - neuro sx following     #acute PE   -c/w hep gtt     #new PAFIB   -now in NSR   -c.w BB   -ac as mentioned above     #CAD sp PCI   -antiplt/ac as mentioned above  -echo w inflat akinesis and overall preserved EF  -repeat limited echo as mentioned above   -c/w BB     #HTN  -hypotension noted- decrease ARB  if needed-- management per  NSICU

## 2022-08-05 NOTE — PROGRESS NOTE ADULT - PROBLEM SELECTOR PLAN 1
Will continue NPH 10u q6 as well as coverage scale for now..  If blood sugars remain elevated > 160, suggest increasing to NPH 12-14u q6. Will continue monitoring blood sugars and FU.

## 2022-08-05 NOTE — SWALLOW BEDSIDE ASSESSMENT ADULT - SWALLOW EVAL: PATIENT/FAMILY GOALS STATEMENT
Patient will tolerate least restrictive po diet by time of discharge without overt s/s penetration/aspiration across trials.

## 2022-08-05 NOTE — PROGRESS NOTE ADULT - SUBJECTIVE AND OBJECTIVE BOX
LANIE BERTRAND  MRN-[id number]    Patient is a 82y old  Female who presents with a chief complaint of abnormal imaging (05 Aug 2022 13:39)      Review of System  REVIEW OF SYSTEMS      General:	Denies fatigue, fevers, chills, sweats, decreased appetite.    Skin/Breast: denies pruritis, rash  	  Ophthalmologic: no change in vision or blurring  	  HEENT	Denies dry mouth, oral sores, dysphagia,  change in hearing.    Respiratory and Thorax:  cough, sob, wheeze, hemoptysis  	  Cardiovascular:	no cp , palp, orthopnea    Gastrointestinal:	no n/v/d constipation    Genitourinary:	no dysuria of frequency, no hematuria, no flank pain    Musculoskeletal:	no bone or joint pain. no muscle aches.     Neurological:	no change in sensory or motor function. no headache. no weakness.     Psychiatric:	no depression, no anxiety, insomnia.     Hematology/Lymphatics:	no bleeding or bruising        Current Meds  MEDICATIONS  (STANDING):  acetylcysteine 10%  Inhalation 4 milliLiter(s) Inhalation every 6 hours  aspirin  chewable 81 milliGRAM(s) Oral daily  atorvastatin 80 milliGRAM(s) Oral at bedtime  BACItracin   Ointment 1 Application(s) Topical two times a day  brivaracetam  IVPB 100 milliGRAM(s) IV Intermittent two times a day  chlorhexidine 4% Liquid 1 Application(s) Topical daily  dexAMETHasone  Injectable 1 milliGRAM(s) IV Push every 12 hours  gabapentin 200 milliGRAM(s) Oral at bedtime  heparin  Infusion 800 Unit(s)/Hr (8.5 mL/Hr) IV Continuous <Continuous>  insulin lispro (ADMELOG) corrective regimen sliding scale   SubCutaneous every 6 hours  insulin NPH human recombinant 10 Unit(s) SubCutaneous every 6 hours  losartan 100 milliGRAM(s) Oral daily  metoprolol tartrate 12.5 milliGRAM(s) Oral two times a day  pantoprazole  Injectable 40 milliGRAM(s) IV Push every 24 hours  piperacillin/tazobactam IVPB.. 3.375 Gram(s) IV Intermittent every 8 hours  psyllium Powder 1 Packet(s) Enteral Tube every 12 hours  senna 2 Tablet(s) Oral at bedtime  sodium chloride 3%  Inhalation 4 milliLiter(s) Inhalation every 6 hours    MEDICATIONS  (PRN):  melatonin 5 milliGRAM(s) Oral at bedtime PRN Sleep      Vitals  Vital Signs Last 24 Hrs  T(C): 37 (05 Aug 2022 11:00), Max: 37 (05 Aug 2022 11:00)  T(F): 98.6 (05 Aug 2022 11:00), Max: 98.6 (05 Aug 2022 11:00)  HR: 78 (05 Aug 2022 13:00) (49 - 78)  BP: 106/59 (05 Aug 2022 13:00) (87/44 - 159/60)  BP(mean): 77 (05 Aug 2022 13:00) (60 - 97)  RR: 23 (05 Aug 2022 13:00) (17 - 29)  SpO2: 100% (05 Aug 2022 13:00) (97% - 100%)    Parameters below as of 05 Aug 2022 09:27  Patient On (Oxygen Delivery Method): room air        Physical Exam  PHYSICAL EXAM:      Constitutional: NAD    Eyes: PERRLA EOMI, anicteric sclera    Heent :No oral sores, no pharyngeal injection. moist mucosa.    Neck: supple, no jvd, no LAD    Respiratory: CTA b/l     Cardiovascular: s1s2, no m/g/r    Gastrointestinal: soft, nt, nd, + BS    Extremities: no c/c/e    Neurological:A&O x 3 moves all ext.    Skin: no rash on exposed skin    Lymph Nodes: no lymphadenopathy.              Lab  CBC Full  -  ( 04 Aug 2022 21:53 )  WBC Count : 16.18 K/uL  RBC Count : 4.56 M/uL  Hemoglobin : 14.0 g/dL  Hematocrit : 44.3 %  Platelet Count - Automated : 229 K/uL  Mean Cell Volume : 97.1 fl  Mean Cell Hemoglobin : 30.7 pg  Mean Cell Hemoglobin Concentration : 31.6 gm/dL  Auto Neutrophil # : x  Auto Lymphocyte # : x  Auto Monocyte # : x  Auto Eosinophil # : x  Auto Basophil # : x  Auto Neutrophil % : x  Auto Lymphocyte % : x  Auto Monocyte % : x  Auto Eosinophil % : x  Auto Basophil % : x    08-04    135  |  99  |  42<H>  ----------------------------<  274<H>  4.7   |  20<L>  |  1.05    Ca    9.3      04 Aug 2022 21:53  Phos  4.0     08-04  Mg     2.4     08-04    TPro  6.7  /  Alb  3.0<L>  /  TBili  0.5  /  DBili  x   /  AST  37  /  ALT  22  /  AlkPhos  79  08-04    PT/INR - ( 04 Aug 2022 21:53 )   PT: 11.4 sec;   INR: 0.99 ratio         PTT - ( 05 Aug 2022 11:53 )  PTT:69.5 sec    Rad:    Assessment/Plan

## 2022-08-05 NOTE — PROGRESS NOTE ADULT - ASSESSMENT
Assessment  DMT2: 82y Female with DM T2 with hyperglycemia, A1C 8.4%, was on oral meds and insulin at home, transitioned to NPH insulin and coverage (first dose NPH at 10AM), blood sugars running high and not at target, no hypoglycemias, dexamethasone taper in progress, started on tube feeds.  Brain Mass: postop, on meds, monitored, FU Neurosurgery.  Lung Mass: s/p bronchoscopy, CA workup in progress, monitored.  Thyroid Nodules: seen on imaging, B/L subcentimeter thyroid nodules, she denies neck mass/pain/dysphagia, euthyroid, reports known history and is being followed by Dr. Richardson.  Adrenal Nodule: seen on imaging, 1.2 cm left thyroid nodule, unknown history. AM cortisol suppressed 2/2 current steroid use, aldosterone and metanephrine labs in normal range.        Troy Morelia ESPINOZA  [phone]

## 2022-08-05 NOTE — SWALLOW BEDSIDE ASSESSMENT ADULT - SLP PERTINENT HISTORY OF CURRENT PROBLEM
81 y/o female (former smoker) with PMHx of HTN, CAD, HLD, MI, peripheral neuropathy, DM and peripheral artery disease presented to the ED for imaging for her head due to abnormal spine MRI by her orthopedist (Dr. Castro) during workup for her spine for her chronic back problems. The orthopedist noted something in her brain. Otherwise pt has no symptoms, stated that since 04/2022 she has had gait/balance issues. Endorsed that if she sits for a while and gets up, she feels like she is "drunk" and feels off. Denied chest pain, SOB, fevers, chills, numbness/paresthesias, muscular weakness, dizziness, abdominal pain, headaches. MR head significant for R temporal lobe lesion with edema, midline shift and uncal herniation concerning for metastatic disease. 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

## 2022-08-05 NOTE — PROGRESS NOTE ADULT - SUBJECTIVE AND OBJECTIVE BOX
Chief complaint  Patient is a 82y old  Female who presents with a chief complaint of abnormal imaging (05 Aug 2022 10:06)   Review of systems  Patient in bed, NAD, no hypoglycemic episodes.    Labs and Fingersticks  CAPILLARY BLOOD GLUCOSE      POCT Blood Glucose.: 288 mg/dL (05 Aug 2022 09:50)  POCT Blood Glucose.: 275 mg/dL (05 Aug 2022 03:36)  POCT Blood Glucose.: 305 mg/dL (04 Aug 2022 22:21)  POCT Blood Glucose.: 186 mg/dL (04 Aug 2022 17:32)  POCT Blood Glucose.: 197 mg/dL (04 Aug 2022 12:31)      Anion Gap, Serum: 16 (08-04 @ 21:53)  Anion Gap, Serum: 12 (08-04 @ 15:08)  Anion Gap, Serum: 14 (08-04 @ 04:20)      Calcium, Total Serum: 9.3 (08-04 @ 21:53)  Calcium, Total Serum: 9.0 (08-04 @ 15:08)  Calcium, Total Serum: 8.2 *L* (08-04 @ 04:20)  Albumin, Serum: 3.0 *L* (08-04 @ 21:53)  Albumin, Serum: 2.2 *L* (08-04 @ 04:20)    Alanine Aminotransferase (ALT/SGPT): 22 (08-04 @ 21:53)  Alanine Aminotransferase (ALT/SGPT): 17 (08-04 @ 04:20)  Alkaline Phosphatase, Serum: 79 (08-04 @ 21:53)  Alkaline Phosphatase, Serum: 57 (08-04 @ 04:20)  Aspartate Aminotransferase (AST/SGOT): 37 (08-04 @ 21:53)  Aspartate Aminotransferase (AST/SGOT): 48 *H* (08-04 @ 04:20)        08-04    135  |  99  |  42<H>  ----------------------------<  274<H>  4.7   |  20<L>  |  1.05    Ca    9.3      04 Aug 2022 21:53  Phos  4.0     08-04  Mg     2.4     08-04    TPro  6.7  /  Alb  3.0<L>  /  TBili  0.5  /  DBili  x   /  AST  37  /  ALT  22  /  AlkPhos  79  08-04                        14.0   16.18 )-----------( 229      ( 04 Aug 2022 21:53 )             44.3     Medications  MEDICATIONS  (STANDING):  acetylcysteine 10%  Inhalation 4 milliLiter(s) Inhalation every 6 hours  aspirin  chewable 81 milliGRAM(s) Oral daily  atorvastatin 80 milliGRAM(s) Oral at bedtime  BACItracin   Ointment 1 Application(s) Topical two times a day  brivaracetam  IVPB 100 milliGRAM(s) IV Intermittent two times a day  chlorhexidine 4% Liquid 1 Application(s) Topical daily  dexAMETHasone  Injectable 1 milliGRAM(s) IV Push every 12 hours  gabapentin 200 milliGRAM(s) Oral at bedtime  heparin  Infusion 800 Unit(s)/Hr (8.5 mL/Hr) IV Continuous <Continuous>  insulin lispro (ADMELOG) corrective regimen sliding scale   SubCutaneous every 6 hours  insulin NPH human recombinant 10 Unit(s) SubCutaneous every 6 hours  losartan 100 milliGRAM(s) Oral daily  metoprolol tartrate 12.5 milliGRAM(s) Oral two times a day  pantoprazole  Injectable 40 milliGRAM(s) IV Push every 24 hours  piperacillin/tazobactam IVPB.. 3.375 Gram(s) IV Intermittent every 8 hours  psyllium Powder 1 Packet(s) Enteral Tube every 12 hours  senna 2 Tablet(s) Oral at bedtime  sodium chloride 3%  Inhalation 4 milliLiter(s) Inhalation every 6 hours      Physical Exam  Vital Signs Last 12 Hrs  T(F): 98.6 (08-05-22 @ 11:00), Max: 98.6 (08-05-22 @ 11:00)  HR: 72 (08-05-22 @ 11:00) (63 - 77)  BP: 87/44 (08-05-22 @ 11:00) (87/44 - 144/69)  BP(mean): 60 (08-05-22 @ 11:00) (60 - 97)  RR: 25 (08-05-22 @ 11:00) (17 - 25)  SpO2: 98% (08-05-22 @ 11:00) (98% - 100%)    Diagnostics    Metanephrine, Plasma: AM Sched. Collection: 21-Jul-2022 06:00 (07-20 @ 10:54)  Aldosterone, Serum: AM Sched. Collection: 21-Jul-2022 06:00 (07-20 @ 10:54)  Cortisol AM, Serum: AM Sched. Collection: 21-Jul-2022 06:00 (07-20 @ 10:54)

## 2022-08-05 NOTE — PROGRESS NOTE ADULT - SUBJECTIVE AND OBJECTIVE BOX
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; pulmonary embolism; STEMI; lethargy    INTERVAL HISTORY: much more awake and alert -> A & O x 3 -> slow speech and talking as if intoxicated; transferred to the surgical ICU; continues on ASA and heparin gtt without a change in brain CT following a NSTEMI; no shortness of breath or hypoxemia on room air; no cough, sputum production, hemoptysis, chest congestion or wheeze; no fevers, chills or sweats; no chest pain/pressure or palpitations; s/p bronchoscopy with endobronchial brushings/biopsies of an obstructing left upper lobe endobronchial lesion -> 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; no seizures on EEG; receiving NGT feeds due to dysphagia    REVIEW OF SYSTEMS:  Constitutional: As per interval history  HEENT: Within normal limits  CV: As per interval history  Resp: As per interval history  GI: Within normal limits   : Within normal limits  Musculoskeletal: Within normal limits  Skin: Within normal limits  Neurological: slow speech  Psychiatric: Within normal limits  Endocrine: Within normal limits  Hematologic/Lymphatic: Within normal limits  Allergic/Immunologic: Within normal limits    MEDICATIONS:     Pulmonary "  acetylcysteine 10%  Inhalation 4 milliLiter(s) Inhalation every 6 hours  sodium chloride 3%  Inhalation 4 milliLiter(s) Inhalation every 6 hours    Anti-microbials:  piperacillin/tazobactam IVPB.. 3.375 Gram(s) IV Intermittent every 8 hours    Cardiovascular:  losartan 100 milliGRAM(s) Oral daily  metoprolol tartrate 12.5 milliGRAM(s) Oral two times a day    Other:  aspirin enteric coated 81 milliGRAM(s) Oral daily  atorvastatin 80 milliGRAM(s) Oral at bedtime  BACItracin   Ointment 1 Application(s) Topical two times a day  brivaracetam  IVPB 100 milliGRAM(s) IV Intermittent two times a day  chlorhexidine 4% Liquid 1 Application(s) Topical daily  dexAMETHasone  Injectable 1 milliGRAM(s) IV Push every 12 hours  gabapentin 200 milliGRAM(s) Oral at bedtime  heparin  Infusion 800 Unit(s)/Hr IV Continuous <Continuous>  insulin lispro (ADMELOG) corrective regimen sliding scale   SubCutaneous every 6 hours  insulin NPH human recombinant 10 Unit(s) SubCutaneous every 6 hours  pantoprazole  Injectable 40 milliGRAM(s) IV Push every 24 hours  psyllium Powder 1 Packet(s) Enteral Tube every 12 hours  senna 2 Tablet(s) Oral at bedtime    MEDICATIONS  (PRN):  melatonin 5 milliGRAM(s) Oral at bedtime PRN Sleep      OBJECTIVE:    POCT Blood Glucose.: 288 mg/dL (05 Aug 2022 09:50)  POCT Blood Glucose.: 275 mg/dL (05 Aug 2022 03:36)  POCT Blood Glucose.: 305 mg/dL (04 Aug 2022 22:21)  POCT Blood Glucose.: 186 mg/dL (04 Aug 2022 17:32)  POCT Blood Glucose.: 197 mg/dL (04 Aug 2022 12:31)      PHYSICAL EXAM:       ICU Vital Signs Last 24 Hrs  T(C): 36.9 (05 Aug 2022 07:00), Max: 36.9 (05 Aug 2022 07:00)  T(F): 98.4 (05 Aug 2022 07:00), Max: 98.4 (05 Aug 2022 07:00)  HR: 72 (05 Aug 2022 10:00) (49 - 77)  BP: 116/60 (05 Aug 2022 10:00) (95/51 - 174/69)  BP(mean): 83 (05 Aug 2022 10:00) (69 - 99)  ABP: --  ABP(mean): --  RR: 24 (05 Aug 2022 10:00) (17 - 28)  SpO2: 98% (05 Aug 2022 10:00) (97% - 100%) on room air    General: Awake. Alert. Slow speech. Sounds intoxicated. Cooperative. No distress. Appears stated age.	  HEENT:  Right craniotomy incision stapled. Normocephalic. Anicteric. Normal oral mucosa. PERRL. EOMI.  Neck: Supple. Trachea midline. Thyroid without enlargement/tenderness/nodules. No carotid bruit. No JVD.	  Cardiovascular: Regular rate and rhythm. S1 S2 normal. No murmurs, rubs or gallops.  Respiratory: Respirations unlabored. Clear to auscultation and percussion bilaterally. No curvature.  Abdomen: Soft. Non-tender. Non-distended. No organomegaly. No masses. Normal bowel sounds.  Extremities: Warm to touch. No clubbing or cyanosis. No pedal edema.   Pulses: 2+ peripheral pulses all extremities.	  Skin: Craniotomy scar C/D/I. Abrasion on the forehead at the site of the craniotomy dressing  Lymph Nodes: Cervical, supraclavicular and axillary nodes normal  Neurological: Moving all extremities. A and O x 3.  Psychiatry: Calm    LABS:                          14.0   16.18 )-----------( 229      ( 04 Aug 2022 21:53 )             44.3     CBC    WBC  16.18 <==, 17.41 <==, 20.39 <==, 18.86 <==, 16.24 <==, 17.37 <==, 25.02 <==    Hemoglobin  14.0 <<==, 12.4 <<==, 11.5 <<==, 13.2 <<==, 13.3 <<==, 13.4 <<==, 15.2 <<==    Hematocrit  44.3 <==, 38.9 <==, 34.9 <==, 40.4 <==, 40.7 <==, 40.5 <==, 45.4 <==    Platelets  229 <==, 219 <==, 182 <==, 230 <==, 267 <==, 303 <==, 336 <==      135  |  99  |  42<H>  ----------------------------<  274<H>    08-04  4.7   |  20<L>  |  1.05      LYTES    sodium  135 <==, 140 <==, 137 <==, 140 <==, 141 <==, 136 <==, 141 <==    potassium   4.7 <==, 4.5 <==, 4.3 <==, 4.5 <==, 4.5 <==, 4.2 <==, 4.4 <==    chloride  99 <==, 103 <==, 100 <==, 102 <==, 102 <==, 94 <==, 102 <==    carbon dioxide  20 <==, 25 <==, 24 <==, 25 <==, 28 <==, 25 <==, 28 <==    =============================================================================================  RENAL FUNCTION:    Creatinine:   1.05  <<==, 1.13  <<==, 1.06  <<==, 1.28  <<==, 1.23  <<==, 1.24  <<==, 0.94  <<==    BUN:   42 <==, 44 <==, 41 <==, 56 <==, 60 <==, 55 <==, 42 <==    ============================================================================================    calcium   9.3 <==, 9.0 <==, 8.2 <==, 8.9 <==, 8.8 <==, 9.0 <==, 9.2 <==    phos   4.0 <==, 4.6 <==, 5.3 <==, 2.7 <==, 3.3 <==, 3.4 <==    mag   2.4 <==, 2.5 <==, 1.8 <==, 2.2 <==, 1.7 <==, 1.6 <==    ============================================================================================  LFTs    AST:   37 <== , 48 <== , 57 <== , 53 <== , 25 <==     ALT:  22  <== , 17  <== , 16  <== , 14  <== , 16  <==     AP:  79  <=, 57  <=, 66  <=, 63  <=, 75  <=    Bili:  0.5  <=, 0.4  <=, 0.2  <=, 0.2  <=, 0.2  <=      PT/INR - ( 04 Aug 2022 21:53 )   PT: 11.4 sec;   INR: 0.99 ratio       PTT - ( 05 Aug 2022 04:48 )  PTT:53.9 sec    Venous Blood Gas:   @ 21:40  7.37/49/21/28/23.0  VBG Lactate: 2.3    Procalcitonin, Serum: 0.11 ng/mL ( @ 07:45)    Serum Pro-Brain Natriuretic Peptide: 4009 pg/mL ( @ 21:53)  Serum Pro-Brain Natriuretic Peptide: 7071 pg/mL ( @ 04:20)  Serum Pro-Brain Natriuretic Peptide: 7944 pg/mL ( @ 07:45)  Serum Pro-Brain Natriuretic Peptide: 7011 pg/mL ( @ 04:07)  Serum Pro-Brain Natriuretic Peptide: 1286 pg/mL ( @ 20:29)    CARDIAC MARKERS ( 04 Aug 2022 21:53 )  CPK x     /CKMB x     /CKMB Units x        troponin 1633 ng/L    CARDIAC MARKERS ( 04 Aug 2022 15:08 )  CPK x     /CKMB x     /CKMB Units x        troponin 1535 ng/L    CARDIAC MARKERS ( 04 Aug 2022 04:20 )   U/L /CKMB x     /CKMB Units 18.9 ng/mL    troponin 1971 ng/L    CARDIAC MARKERS ( 03 Aug 2022 08:57 )   U/L /CKMB x     /CKMB Units 29.3 ng/mL    troponin 1121 ng/L    CARDIAC MARKERS ( 03 Aug 2022 07:45 )   U/L /CKMB x     /CKMB Units 32.0 ng/mL    troponin 1120 ng/L    CARDIAC MARKERS ( 02 Aug 2022 17:12 )   U/L /CKMB x     /CKMB Units 63.2 ng/mL    troponin 948 ng/L  CARDIAC MARKERS ( 02 Aug 2022 11:09 )   U/L /CKMB x     /CKMB Units 72.7 ng/mL    troponin 961 ng/L    CARDIAC MARKERS ( 02 Aug 2022 04:07 )   U/L /CKMB x     /CKMB Units 57.1 ng/mL    troponin 622 ng/L    CARDIAC MARKERS ( 01 Aug 2022 20:29 )   U/L /CKMB x     /CKMB Units 19.7 ng/mL    troponin 285 ng/L    CARDIAC MARKERS ( 01 Aug 2022 15:26 )  CPK 78 U/L /CKMB x     /CKMB Units 2.0 ng/mL    troponin 22 ng/L    < from: Limited Transthoracic Echo (w/Cont) (22 @ 08:02) >    Patient name: LANIE BERTRAND  YOB: 1940   Age: 82 (F)   MR#: [id number]  Study Date: 2022  ------------------------------------------------------------------------  Conclusions:  1. Endocardial visualization enhanced with intravenous  injection of Ultrasonic Enhancing Agent (Lumason). Left  ventricle not well visualized despite intravenous  ultrasonic enhancing agent; grossly, preserved left  ventricular systolic function with segmental wall motion  abnormalities. EF approximately 55%. The mid to distal  inferolateral and mid to distal inferior segments are  hypkinetic. The apex is akinetic. No left ventricular  thrombus seen.  *** Compared with echocardiogram of 2022, results are  similar on today's study. The mid to distal inferior and  the apex appear hypo/akinetic on today's study on images  with intravenous ultrasonic enhancing agent.  ------------------------------------------------------------------------  Confirmed on  2022 - 10:57:19 by Kelsie Becker M.D.  ------------------------------------------------------------------------  ------------------------------------------------------------------------  Surgical Pathology Report (22 @ 11:53)   Surgical Pathology Report:   1. Left mainstem bronchus biopsy   Final Diagnosis   Bronchus, mainstem, left, biopsy:   In summary the morphology and immunophenotype are consistent with poorly   differentiated adenocarcinoma of lung origin.   Dr. Hopkins was notified of the diagnosis on 2022.   ------------------------------------------------------------------------------  MICROBIOLOGY:   Urinalysis Basic - ( 03 Aug 2022 08:40 )    Color: Light Yellow / Appearance: Clear / S.015 / pH: x  Gluc: x / Ketone: Trace  / Bili: Negative / Urobili: Negative   Blood: x / Protein: Negative / Nitrite: Negative   Leuk Esterase: Large / RBC: 1 /hpf / WBC 51 /HPF   Sq Epi: x / Non Sq Epi: 0 /hpf / Bacteria: Many    Culture - Urine (22 @ 13:05)   Specimen Source: Catheterized Catheterized   Culture Results:   >100,000 CFU/ml Escherichia coli   >100,000 CFU/ml Klebsiella pneumoniae     Culture - Blood (22 @ 10:59)   Specimen Source: .Blood Blood   Culture Results:   No growth to date.     Culture - Blood (22 @ 10:58)   Specimen Source: .Blood Blood   Culture Results:   No growth to date.     MRSA/MSSA PCR (22 @ 07:20)   MRSA PCR Result.: NotDetefelicia  Staph aureus PCR Result: NotDetec     RADIOLOGY:    EXAM:  CT ANGIO CHEST PULM ART [facility]                          PROCEDURE DATE:  2022      FINDINGS:    PULMONARY ANGIOGRAM: Pulmonary embolism in the lateral segmental branch   of the right middle lobe without evidence of right heart strain. Multiple   segmental branches in the left lung are obscured by motion.    LYMPH NODES: Stable 1.2 cm left hilar lymph node.    HEART/VASCULATURE: The heart is normal in size. No pericardial effusion.   There are aortic, aortic valve, and coronary artery calcifications.    AIRWAYS/LUNGS/PLEURA: Patent central airways. Left upper lobe mass   measuring 2.7 x 2.1 cm, series 5 image 50, decreased in size from prior,   and unchanged obstruction left apical posterior bronchus, and associated   segmental atelectasis along the mediastinum. No pleural effusion or   pneumothorax.    UPPER ABDOMEN: Within normal limits.    BONES/SOFT TISSUES: Stable multinodular thyroid. Metallic hardware in the   right humeral head and shaft. Degenerative changes of the spine.    IMPRESSION:  Pulmonary embolism in the lateral segmental branch of the right middle   lobe. No evidence of right heart strain.    Interval decrease in left upper lobe mass. Unchanged obstruction of the   posterior bronchus, and stable fibroglandular lymph node.    COMMUNICATION:  Discussed positive PE findings with [facility] DAVID Poon by Dr. Michelle Rebollar at 12:15 PM on 8/3/2022.    MICHELLE JAUREGUI MD; Resident Radiologist  This document has been electronically signed.  NEHA BAILON M.D., ATTENDINGRADIOGIST  This document has been electronically signed. Aug  3 2022 12:35PM  ---------------------------------------------------------------------------------------------------------------  EXAM:  CT BRAIN                          PROCEDURE DATE:  2022      IMPRESSION:    Redemonstration of right temporal craniotomy for prior resection of a   lesion. There is a large degree of hypoattenuation throughout the right   temporal lobe and extending into the right basal ganglia and right   parietal lobe, suggestive of a combination of vasogenic edema and/or   encephalomalacia/gliosis. This results in secondary mass effect upon the   parenchyma of the right cerebral hemisphere, not significantly changed.   There is mild mass effect on the ventricular system, right greater than   left. There is secondary right to left midline shift of approximately 5.7   mm. Expected postsurgical extra-axial pneumocephalus is reidentified   without significant change. Tiny expected subdural hematoma overlying the   right anterior superior frontal lobe is reidentified without change.    Reidentified is a hypoattenuating lesion within the left medial frontal   lobe measuring 1.4 x 1.6 cm, compatible with known additional metastasis.   No significant surrounding edema.    NERI CERNA MD; Resident Radiologist  This document has been electronically signed.  ALTAF SEBASTIAN MD; Attending Radiologist  This document has been electronically signed. Aug  3 2022 10:23AM  ---------------------------------------------------------------------------------------------------------------  EXAM:  CT ABDOMEN AND PELVIS IC                        EXAM:  CT CHEST IC                          PROCEDURE DATE:  2022      FINDINGS:  CHEST:  LUNGS AND LARGE AIRWAYS: There is a heterogeneous left upper lobe mass   approximately measuring 3.3 x 2.6 cm (2, 28) obstructing the left apical   posterior bronchus with partial atelectasis of the left upper lobe. Few   scattered bilateral pulmonary nodules measuring up to 3 mm in the right   upper lobe (2, 39).  PLEURA: No pleural effusion.  VESSELS: Atherosclerotic changes of the aorta and coronary arteries.  HEART: Heart size is normal. No pericardial effusion.  MEDIASTINUM AND SIXTO: Soft tissue contiguous with mass versus adenopathy   measuring 1.7 x 1.5 cm (2, 33).  CHEST WALL AND LOWER NECK: Bilateral subcentimeter hypoattenuating   thyroid nodules.    ABDOMEN AND PELVIS:  LIVER: Within normal limits.  BILE DUCTS: Normal caliber.  GALLBLADDER: Within normal limits.  SPLEEN: Within normal limits.  PANCREAS: Within normal limits.  ADRENALS: Indeterminant left adrenal nodule measuring 1.2 cm.  KIDNEYS/URETERS: No hydronephrosis. Renal cysts and subcentimeter   hypoattenuating foci bilaterally, too small to characterize.    BLADDER: Within normal limits.  REPRODUCTIVE ORGANS: Uterus and adnexa within normal limits.    BOWEL: Colonic diverticula withoutevidence of acute diverticulitis. No   bowel obstruction. Appendix is normal.  PERITONEUM: No ascites.  VESSELS: Atherosclerotic changes.  RETROPERITONEUM/LYMPH NODES: No lymphadenopathy.  ABDOMINAL WALL: Within normal limits.  BONES: Degenerative changes. Grade 1 anterolisthesis of L5 on S1. Status   post right shoulder arthroplasty.    IMPRESSION:  Left upper lobe lung mass with partial atelectasis of the left upper   lobe, concerning for primary lung neoplasm. Few scattered sub-4 mm   pulmonary nodules, indeterminate.    Indeterminant left adrenal nodule for which contrast enhanced MRI is   recommended for further evaluation.    DEEPAK MORALES MD; Resident Radiologist  This document has been electronically signed.  HAYLEY DENTON MD; Attending Radiologist  This document has been electronically signed. 2022  9:02AM  ---------------------------------------------------------------------------------------------------------------  EXAM:  DUPLEX SCAN EXT VEINS LOWER BI                          PROCEDURE DATE:  2022      IMPRESSION:  No evidence of deep venous thrombosis in either lower extremity.    YENIFER QUAN MD; Attending Radiologist  This document has been electronically signed. 2022  2:08PM  ---------------------------------------------------------------------------------------------------------------

## 2022-08-05 NOTE — PROGRESS NOTE ADULT - SUBJECTIVE AND OBJECTIVE BOX
Patient seen and examined at bedside.    --Anticoagulation--  aspirin enteric coated 81 milliGRAM(s) Oral daily  heparin  Infusion 800 Unit(s)/Hr IV Continuous <Continuous>    T(C): 36.6 (08-05-22 @ 03:00), Max: 36.6 (08-04-22 @ 08:00)  HR: 67 (08-05-22 @ 04:00) (49 - 77)  BP: 144/69 (08-05-22 @ 04:00) (95/52 - 174/69)  RR: 21 (08-05-22 @ 04:00) (17 - 28)  SpO2: 100% (08-05-22 @ 04:00) (96% - 100%)  Wt(kg): --    Exam: AOx3, PERRL, EOMI, no facial/drift, DE LA GARZA AG

## 2022-08-05 NOTE — PROGRESS NOTE ADULT - ASSESSMENT
81 yo female with history of HTN,CAD,PAD,MI, and DM admitted July 19th with gait insatbility and OPD scans showing brain lesions.  She was found to have lung cancer, bronch on 7/20 with poorly differentiated cancer and CT scan with brain mets.  She is s/p RT frontal carniotomy on 7/27 for metastatic adenocarcinoma.  She has a STEMI on 8/1.  Her recent events over past 48 hours include a leukocytosis, lethargy, and she is now known to have PE.  No fever, possible post obstructive pneumonia, she is being covered broadly although not clear if infection is active.  Her micro is negative so far.Mixed GNR's in the urine may reflect colonizers, although she is being covered.  Suggest:  1. Zosyn for now, day 3  2.Hold additional vanco  3.Anticoagulation per NS  4.Await sensitivities of urine isolates  5, Steroid taper per NS

## 2022-08-05 NOTE — SWALLOW BEDSIDE ASSESSMENT ADULT - SWALLOW EVAL: DIAGNOSIS
81 yo female with history of HTN,CAD,PAD,MI, and DM admitted July 19th with gait insatbility and OPD scans showing brain lesions. She was found to have lung cancer, bronch on 7/20 with poorly differentiated cancer and CT scan with brain mets. She is s/p RT frontal carniotomy on 7/27 for metastatic adenocarcinoma. She has a STEMI on 8/1. Now being assessed for dysphagia. Patient with overlying cognitive-communication deficits noted, with dysarthria, suggest formal speech and language evaluation is warranted at this time. Patient's dysphagia is characterized by suspected piecemeal deglutition, suspected reduced hyolaryngeal elevation/excursion and pharyngeal trigger delay upon digital palpation, multiple swallows (x6-7 per bolus indicative of possible pharyngeal residue), and overt s/s penetration/aspiration following ice chip and honey thick liquid trials as seen by change in vocal quality. No overt s/s penetration/aspiration noted for thin puree consistency.

## 2022-08-05 NOTE — PROGRESS NOTE ADULT - ASSESSMENT
ASSESSMENT:    82 year old gentlewoman, former smoker, without history of intrinsic lung disease. She has a history of HTN, HLD, DM, CAD s/p PCI (1990s and 2021) and PAD s/p RLE stenting. The patient has been having difficulty ambulating over the last several months due to a sense of imbalance with impaired gait. Her family has noted a change in her personality with pressured speech and insomnia. Outpatient cervical MRI incidentally note a CNS lesion. Lumbar spine MRI performed due to chronic back pain revealed multilevel foraminal stenosis/disc bulges. 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 The patient has no shortness of breath or hypoxemia on room air. She has no cough, sputum production, chest congestion or wheeze. No fevers, chills or sweats. No chest pain/pressure or palpitations.     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 presenting with several months of ataxia - frontal lobe lesions have lead to decreased inhibition as well as to pressured and loquacious speech    8/2 - chest pain last night -> EKG c/w STEMI - cardiac enzymes are elevated -> catheterization not performed due to recent neurosurgery -> started on ASA and heparin gtt -> now quite lethargic although awakes to state her name and the name of her children    8/4 - much more awake and alert -> A & O x 3 -> very slow speech; remains in the CCU -> EKG c/w STEMI - cardiac enzymes are elevated -> catheterization not performed due to recent neurosurgery -> continues on ASA and heparin gtt without a change in brain CT -> troponin level continues to increase; no shortness of breath or hypoxemia on a 2lpm nasal canula    8/5 - much more awake and alert -> A & O x 3 -> slow speech and talking as if intoxicated; transferred to the surgical ICU; continues on ASA and heparin gtt without a change in brain CT following a NSTEMI; no shortness of breath or hypoxemia on room air; no seizures on EEG; receiving NGT feeds due to dysphagia    PLAN/RECOMMENDATIONS:    stable oxygenation on room air  markedly improved mental status on zosyn for E coli and Klebsiella UTI and possible post-obstructive pneumonia - MRSA nasal swab is negative -> vancomycin discontinued  CTA -> pulmonary embolism in the lateral segmental branch of the right middle lobe pulmonary artery without evidence of right heart strain - multiple segmental branches in the left lung are obscured by motion - 1.2 cm left hilar lymph node - patent central airways - left upper lobe mass decreased in size - obstruction of the left apical posterior bronchus with associated segmental atelectasis along the mediastinum  spirometry    FEV1 - 1.81 liters - 88% predicted    FVC - 2.48 liters - 90% predicted    FEV1% - 73       c/w normal spirometry  mucomyst and hypertonic saline nebs to facilitate mucous clearance   patient is already on heparin gtt for ACS which will treat the pulmonary embolism  s/p bronchoscopy -> 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 - foundation studies have been ordered  cardiology evaluation noted     STEMI - not a candidate for percutaneous coronary intervention given recent craniotomy limiting anti-platelet agents    medical management including ASA/heparin gtt/lipitor/losartan/metoprolol  neurosurgery evaluation noted    s/p right craniotomy for tumor resection    repeat CT scan 8/2  -> right temporal craniotomy - minimally decreased right frontal pneumocephalus - similar subdural hemorrhage along the right lateral convexity measuring up to 7 to 8 mm in greatest depth anteriorly - extensive edema surrounding the right temporal surgical bed - leftward midline shift of 6 to 7 mm - partial effacement of the suprasellar and right perimesencephalic cisterns - mild asymmetric dilatation of the left lateral ventricle - low density lesion with mild surrounding edema in the left parasagittal frontal lobe.    prophylactic anticonvulsants -> brivaracetam - no seizures on EEG    decadron 1mg q12h po for cerebral edema -> glucose control on steroids    incentive spirometry as able    NGT feeds due to dysphagia    GI/DVT prophylaxis - protonix/heparin gtt  radiation oncology, neurooncology, medical oncology evaluations   bowel regimen    Will follow with you. Plan of care discussed with the patient and her family at bedside.    Satish Hopkins MD, [facility]  [phone]  Pulmonary Medicine

## 2022-08-05 NOTE — SWALLOW BEDSIDE ASSESSMENT ADULT - ADDITIONAL RECOMMENDATIONS
This service to continue to follow. ST recommended at the next level of care. Consider formal speech and language evaluation to assess speech and cognitive-linguistic skills.

## 2022-08-05 NOTE — SWALLOW BEDSIDE ASSESSMENT ADULT - COMMENTS
s/p R crani for metastatic lesion 7/27 consistent w metastatic non small cell lung cancer, residual lesion on L side of brain. Course complicated on the hospital floors by inferolateral STEMI 8/1, pt transferred to CICU and started on hep gtt no bolus and ASA, no cath given high risk from comorbidities and recent surgery.

## 2022-08-06 LAB
-  AMIKACIN: SIGNIFICANT CHANGE UP
-  AMIKACIN: SIGNIFICANT CHANGE UP
-  AMOXICILLIN/CLAVULANIC ACID: SIGNIFICANT CHANGE UP
-  AMOXICILLIN/CLAVULANIC ACID: SIGNIFICANT CHANGE UP
-  AMPICILLIN/SULBACTAM: SIGNIFICANT CHANGE UP
-  AMPICILLIN/SULBACTAM: SIGNIFICANT CHANGE UP
-  AMPICILLIN: SIGNIFICANT CHANGE UP
-  AMPICILLIN: SIGNIFICANT CHANGE UP
-  AZTREONAM: SIGNIFICANT CHANGE UP
-  AZTREONAM: SIGNIFICANT CHANGE UP
-  CEFAZOLIN: SIGNIFICANT CHANGE UP
-  CEFAZOLIN: SIGNIFICANT CHANGE UP
-  CEFEPIME: SIGNIFICANT CHANGE UP
-  CEFEPIME: SIGNIFICANT CHANGE UP
-  CEFOXITIN: SIGNIFICANT CHANGE UP
-  CEFOXITIN: SIGNIFICANT CHANGE UP
-  CEFTRIAXONE: SIGNIFICANT CHANGE UP
-  CEFTRIAXONE: SIGNIFICANT CHANGE UP
-  CIPROFLOXACIN: SIGNIFICANT CHANGE UP
-  CIPROFLOXACIN: SIGNIFICANT CHANGE UP
-  ERTAPENEM: SIGNIFICANT CHANGE UP
-  ERTAPENEM: SIGNIFICANT CHANGE UP
-  GENTAMICIN: SIGNIFICANT CHANGE UP
-  GENTAMICIN: SIGNIFICANT CHANGE UP
-  IMIPENEM: SIGNIFICANT CHANGE UP
-  IMIPENEM: SIGNIFICANT CHANGE UP
-  LEVOFLOXACIN: SIGNIFICANT CHANGE UP
-  LEVOFLOXACIN: SIGNIFICANT CHANGE UP
-  MEROPENEM: SIGNIFICANT CHANGE UP
-  MEROPENEM: SIGNIFICANT CHANGE UP
-  NITROFURANTOIN: SIGNIFICANT CHANGE UP
-  NITROFURANTOIN: SIGNIFICANT CHANGE UP
-  PIPERACILLIN/TAZOBACTAM: SIGNIFICANT CHANGE UP
-  PIPERACILLIN/TAZOBACTAM: SIGNIFICANT CHANGE UP
-  TIGECYCLINE: SIGNIFICANT CHANGE UP
-  TIGECYCLINE: SIGNIFICANT CHANGE UP
-  TOBRAMYCIN: SIGNIFICANT CHANGE UP
-  TOBRAMYCIN: SIGNIFICANT CHANGE UP
-  TRIMETHOPRIM/SULFAMETHOXAZOLE: SIGNIFICANT CHANGE UP
-  TRIMETHOPRIM/SULFAMETHOXAZOLE: SIGNIFICANT CHANGE UP
ANION GAP SERPL CALC-SCNC: 11 MMOL/L — SIGNIFICANT CHANGE UP (ref 5–17)
APTT BLD: 62.6 SEC — HIGH (ref 27.5–35.5)
APTT BLD: 67.6 SEC — HIGH (ref 27.5–35.5)
APTT BLD: 90.4 SEC — HIGH (ref 27.5–35.5)
BUN SERPL-MCNC: 46 MG/DL — HIGH (ref 7–23)
CALCIUM SERPL-MCNC: 8.9 MG/DL — SIGNIFICANT CHANGE UP (ref 8.4–10.5)
CHLORIDE SERPL-SCNC: 103 MMOL/L — SIGNIFICANT CHANGE UP (ref 96–108)
CO2 SERPL-SCNC: 27 MMOL/L — SIGNIFICANT CHANGE UP (ref 22–31)
CREAT SERPL-MCNC: 1.01 MG/DL — SIGNIFICANT CHANGE UP (ref 0.5–1.3)
CULTURE RESULTS: SIGNIFICANT CHANGE UP
EGFR: 56 ML/MIN/1.73M2 — LOW
GLUCOSE BLDC GLUCOMTR-MCNC: 122 MG/DL — HIGH (ref 70–99)
GLUCOSE BLDC GLUCOMTR-MCNC: 217 MG/DL — HIGH (ref 70–99)
GLUCOSE BLDC GLUCOMTR-MCNC: 225 MG/DL — HIGH (ref 70–99)
GLUCOSE BLDC GLUCOMTR-MCNC: 257 MG/DL — HIGH (ref 70–99)
GLUCOSE BLDC GLUCOMTR-MCNC: 263 MG/DL — HIGH (ref 70–99)
GLUCOSE SERPL-MCNC: 215 MG/DL — HIGH (ref 70–99)
HCT VFR BLD CALC: 32.9 % — LOW (ref 34.5–45)
HCT VFR BLD CALC: 33.4 % — LOW (ref 34.5–45)
HGB BLD-MCNC: 10.6 G/DL — LOW (ref 11.5–15.5)
HGB BLD-MCNC: 10.6 G/DL — LOW (ref 11.5–15.5)
MCHC RBC-ENTMCNC: 30.9 PG — SIGNIFICANT CHANGE UP (ref 27–34)
MCHC RBC-ENTMCNC: 31.5 PG — SIGNIFICANT CHANGE UP (ref 27–34)
MCHC RBC-ENTMCNC: 31.7 GM/DL — LOW (ref 32–36)
MCHC RBC-ENTMCNC: 32.2 GM/DL — SIGNIFICANT CHANGE UP (ref 32–36)
MCV RBC AUTO: 97.4 FL — SIGNIFICANT CHANGE UP (ref 80–100)
MCV RBC AUTO: 97.9 FL — SIGNIFICANT CHANGE UP (ref 80–100)
METHOD TYPE: SIGNIFICANT CHANGE UP
METHOD TYPE: SIGNIFICANT CHANGE UP
NRBC # BLD: 0 /100 WBCS — SIGNIFICANT CHANGE UP (ref 0–0)
NRBC # BLD: 0 /100 WBCS — SIGNIFICANT CHANGE UP (ref 0–0)
ORGANISM # SPEC MICROSCOPIC CNT: SIGNIFICANT CHANGE UP
PLATELET # BLD AUTO: 228 K/UL — SIGNIFICANT CHANGE UP (ref 150–400)
PLATELET # BLD AUTO: 248 K/UL — SIGNIFICANT CHANGE UP (ref 150–400)
POTASSIUM SERPL-MCNC: 4.5 MMOL/L — SIGNIFICANT CHANGE UP (ref 3.5–5.3)
POTASSIUM SERPL-SCNC: 4.5 MMOL/L — SIGNIFICANT CHANGE UP (ref 3.5–5.3)
RBC # BLD: 3.36 M/UL — LOW (ref 3.8–5.2)
RBC # BLD: 3.43 M/UL — LOW (ref 3.8–5.2)
RBC # FLD: 12.6 % — SIGNIFICANT CHANGE UP (ref 10.3–14.5)
RBC # FLD: 12.6 % — SIGNIFICANT CHANGE UP (ref 10.3–14.5)
SARS-COV-2 RNA SPEC QL NAA+PROBE: SIGNIFICANT CHANGE UP
SODIUM SERPL-SCNC: 141 MMOL/L — SIGNIFICANT CHANGE UP (ref 135–145)
SPECIMEN SOURCE: SIGNIFICANT CHANGE UP
WBC # BLD: 12.7 K/UL — HIGH (ref 3.8–10.5)
WBC # BLD: 13.06 K/UL — HIGH (ref 3.8–10.5)
WBC # FLD AUTO: 12.7 K/UL — HIGH (ref 3.8–10.5)
WBC # FLD AUTO: 13.06 K/UL — HIGH (ref 3.8–10.5)

## 2022-08-06 RX ORDER — HEPARIN SODIUM 5000 [USP'U]/ML
750 INJECTION INTRAVENOUS; SUBCUTANEOUS
Qty: 25000 | Refills: 0 | Status: DISCONTINUED | OUTPATIENT
Start: 2022-08-06 | End: 2022-08-09

## 2022-08-06 RX ORDER — HUMAN INSULIN 100 [IU]/ML
18 INJECTION, SUSPENSION SUBCUTANEOUS EVERY 6 HOURS
Refills: 0 | Status: DISCONTINUED | OUTPATIENT
Start: 2022-08-06 | End: 2022-08-07

## 2022-08-06 RX ADMIN — LOSARTAN POTASSIUM 100 MILLIGRAM(S): 100 TABLET, FILM COATED ORAL at 05:46

## 2022-08-06 RX ADMIN — Medication 4 MILLILITER(S): at 02:06

## 2022-08-06 RX ADMIN — PANTOPRAZOLE SODIUM 40 MILLIGRAM(S): 20 TABLET, DELAYED RELEASE ORAL at 17:24

## 2022-08-06 RX ADMIN — Medication 1 PACKET(S): at 05:44

## 2022-08-06 RX ADMIN — Medication 1 PACKET(S): at 17:25

## 2022-08-06 RX ADMIN — SODIUM CHLORIDE 4 MILLILITER(S): 9 INJECTION INTRAMUSCULAR; INTRAVENOUS; SUBCUTANEOUS at 02:06

## 2022-08-06 RX ADMIN — Medication 1 APPLICATION(S): at 17:30

## 2022-08-06 RX ADMIN — HUMAN INSULIN 18 UNIT(S): 100 INJECTION, SUSPENSION SUBCUTANEOUS at 17:26

## 2022-08-06 RX ADMIN — Medication 1 APPLICATION(S): at 05:43

## 2022-08-06 RX ADMIN — HUMAN INSULIN 16 UNIT(S): 100 INJECTION, SUSPENSION SUBCUTANEOUS at 00:29

## 2022-08-06 RX ADMIN — Medication 6: at 10:56

## 2022-08-06 RX ADMIN — Medication 81 MILLIGRAM(S): at 12:20

## 2022-08-06 RX ADMIN — Medication 1 MILLIGRAM(S): at 17:24

## 2022-08-06 RX ADMIN — HUMAN INSULIN 18 UNIT(S): 100 INJECTION, SUSPENSION SUBCUTANEOUS at 12:20

## 2022-08-06 RX ADMIN — BRIVARACETAM 240 MILLIGRAM(S): 25 TABLET, FILM COATED ORAL at 17:23

## 2022-08-06 RX ADMIN — HEPARIN SODIUM 7.5 UNIT(S)/HR: 5000 INJECTION INTRAVENOUS; SUBCUTANEOUS at 07:47

## 2022-08-06 RX ADMIN — PIPERACILLIN AND TAZOBACTAM 25 GRAM(S): 4; .5 INJECTION, POWDER, LYOPHILIZED, FOR SOLUTION INTRAVENOUS at 17:28

## 2022-08-06 RX ADMIN — GABAPENTIN 200 MILLIGRAM(S): 400 CAPSULE ORAL at 22:26

## 2022-08-06 RX ADMIN — PIPERACILLIN AND TAZOBACTAM 25 GRAM(S): 4; .5 INJECTION, POWDER, LYOPHILIZED, FOR SOLUTION INTRAVENOUS at 01:01

## 2022-08-06 RX ADMIN — ATORVASTATIN CALCIUM 80 MILLIGRAM(S): 80 TABLET, FILM COATED ORAL at 22:26

## 2022-08-06 RX ADMIN — Medication 6: at 00:29

## 2022-08-06 RX ADMIN — Medication 4 MILLILITER(S): at 08:59

## 2022-08-06 RX ADMIN — Medication 1 MILLIGRAM(S): at 05:43

## 2022-08-06 RX ADMIN — Medication 4: at 17:22

## 2022-08-06 RX ADMIN — SODIUM CHLORIDE 4 MILLILITER(S): 9 INJECTION INTRAMUSCULAR; INTRAVENOUS; SUBCUTANEOUS at 08:59

## 2022-08-06 RX ADMIN — BRIVARACETAM 240 MILLIGRAM(S): 25 TABLET, FILM COATED ORAL at 05:42

## 2022-08-06 RX ADMIN — HUMAN INSULIN 16 UNIT(S): 100 INJECTION, SUSPENSION SUBCUTANEOUS at 05:42

## 2022-08-06 RX ADMIN — SENNA PLUS 2 TABLET(S): 8.6 TABLET ORAL at 22:27

## 2022-08-06 RX ADMIN — Medication 12.5 MILLIGRAM(S): at 05:46

## 2022-08-06 RX ADMIN — PIPERACILLIN AND TAZOBACTAM 25 GRAM(S): 4; .5 INJECTION, POWDER, LYOPHILIZED, FOR SOLUTION INTRAVENOUS at 08:58

## 2022-08-06 RX ADMIN — Medication 12.5 MILLIGRAM(S): at 17:25

## 2022-08-06 RX ADMIN — Medication 4: at 05:42

## 2022-08-06 NOTE — SPEECH LANGUAGE PATHOLOGY EVALUATION - SLP PERTINENT HISTORY OF CURRENT PROBLEM
81 y/o female (former smoker) with PMHx of HTN, CAD, HLD, MI, peripheral neuropathy, DM and peripheral artery disease presented to the ED for imaging for her head due to abnormal spine MRI by her orthopedist (Dr. Castro) during workup for her spine for her chronic back problems. The orthopedist noted something in her brain. Otherwise pt has no symptoms, stated that since 04/2022 she has had gait/balance issues. Endorsed that if she sits for a while and gets up, she feels like she is "drunk" and feels off. Denied chest pain, SOB, fevers, chills, numbness/paresthesias, muscular weakness, dizziness, abdominal pain, headaches. MR head significant for R temporal lobe lesion with edema, midline shift and uncal herniation concerning for metastatic disease. 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 hx con't s/p R crani for metastatic lesion 7/27 consistent w metastatic non small cell lung cancer, residual lesion on L side of brain. Course complicated on the hospital floors by inferolateral STEMI 8/1, pt transferred to CICU and started on hep gtt no bolus and ASA, no cath given high risk from comorbidities and recent surgery. 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

## 2022-08-06 NOTE — PROGRESS NOTE ADULT - ASSESSMENT
1) Lung and brain masses,  metastatic process.  Lung bx c/w metastatic NSCLC, adenocarcinoma  - pt s/p nsgy, resection of brain met  patient has also been seen by rad/Onc. Getting steroids and keppra.   Further med onc mgmt as outpt  pathology being sent for foundation testing.     2)Leukocytosis- likely reactive/steroid related    3) CAD- s/p MI. mgmt as per mrd, cv    4) PE- ac as per med

## 2022-08-06 NOTE — SPEECH LANGUAGE PATHOLOGY EVALUATION - H & P REVIEW
yes 81 y/o female (former smoker) with PMHx of HTN, CAD, HLD, MI, peripheral neuropathy, DM and peripheral artery disease presented to the ED for imaging for her head due to abnormal spine MRI by her orthopedist (Dr. Castro) during workup for her spine for her chronic back problems. The orthopedist noted something in her brain. Otherwise pt has no symptoms, stated that since 04/2022 she has had gait/balance issues. Endorsed that if she sits for a while and gets up, she feels like she is "drunk" and feels off. Denied chest pain, SOB, fevers, chills, numbness/paresthesias, muscular weakness, dizziness, abdominal pain, headaches. MR head significant for R temporal lobe lesion with edema, midline shift and uncal herniation concerning for metastatic disease. Had bronchial biopsy consistent with poorly differentiated adenocarcinoma 7/20/22./yes

## 2022-08-06 NOTE — PROGRESS NOTE ADULT - ASSESSMENT
83 yo female with history of HTN,CAD,PAD,MI, and DM admitted July 19th with gait insatbility and OPD scans showing brain lesions.  She was found to have lung cancer, bronch on 7/20 with poorly differentiated cancer and CT scan with brain mets.  She is s/p RT frontal carniotomy on 7/27 for metastatic adenocarcinoma.  She has a STEMI on 8/1.  Her recent events over past 48 hours include a leukocytosis, lethargy, and she is now known to have PE.  No fever, possible post obstructive pneumonia, she is being covered broadly although not clear if infection is active.  Her blood cultures remain  negative so far.Mixed GNR's in the urine may reflect colonizers, although she is being covered.  CTA with improvement in known ANTHONY mass.Her leukocytosis may simply be reactive to PE/STEMI/Surgery/Cancer?steroids  Suggest:  1. Zosyn for now, day 4, perhaps 5-7 days for any  or pulmonary infection concerns  2.Hold additional vanco  3.Anticoagulation per NS  4.Await sensitivities of urine isolates  5, Steroid taper per NS

## 2022-08-06 NOTE — PROGRESS NOTE ADULT - SUBJECTIVE AND OBJECTIVE BOX
Chief complaint    Patient is a 82y old  Female who presents with a chief complaint of Brain tumor (06 Aug 2022 16:31)   Review of systems  Patient in bed, appears comfortable.    Labs and Fingersticks  CAPILLARY BLOOD GLUCOSE      POCT Blood Glucose.: 225 mg/dL (06 Aug 2022 16:40)  POCT Blood Glucose.: 263 mg/dL (06 Aug 2022 10:44)  POCT Blood Glucose.: 217 mg/dL (06 Aug 2022 05:11)  POCT Blood Glucose.: 257 mg/dL (06 Aug 2022 00:24)  POCT Blood Glucose.: 279 mg/dL (05 Aug 2022 21:57)      Anion Gap, Serum: 11 (08-06 @ 06:36)  Anion Gap, Serum: 16 (08-04 @ 21:53)      Calcium, Total Serum: 8.9 (08-06 @ 06:36)  Calcium, Total Serum: 9.3 (08-04 @ 21:53)  Albumin, Serum: 3.0 *L* (08-04 @ 21:53)    Alanine Aminotransferase (ALT/SGPT): 22 (08-04 @ 21:53)  Alkaline Phosphatase, Serum: 79 (08-04 @ 21:53)  Aspartate Aminotransferase (AST/SGOT): 37 (08-04 @ 21:53)        08-06    141  |  103  |  46<H>  ----------------------------<  215<H>  4.5   |  27  |  1.01    Ca    8.9      06 Aug 2022 06:36  Phos  4.0     08-04  Mg     2.4     08-04    TPro  6.7  /  Alb  3.0<L>  /  TBili  0.5  /  DBili  x   /  AST  37  /  ALT  22  /  AlkPhos  79  08-04                        10.6   12.70 )-----------( 228      ( 06 Aug 2022 06:36 )             33.4     Medications  MEDICATIONS  (STANDING):  aspirin  chewable 81 milliGRAM(s) Oral daily  atorvastatin 80 milliGRAM(s) Oral at bedtime  BACItracin   Ointment 1 Application(s) Topical two times a day  brivaracetam  IVPB 100 milliGRAM(s) IV Intermittent two times a day  dexAMETHasone  Injectable 1 milliGRAM(s) IV Push every 12 hours  gabapentin 200 milliGRAM(s) Oral at bedtime  heparin  Infusion 750 Unit(s)/Hr (7.5 mL/Hr) IV Continuous <Continuous>  insulin lispro (ADMELOG) corrective regimen sliding scale   SubCutaneous every 6 hours  insulin NPH human recombinant 18 Unit(s) SubCutaneous every 6 hours  losartan 100 milliGRAM(s) Oral daily  metoprolol tartrate 12.5 milliGRAM(s) Oral two times a day  pantoprazole  Injectable 40 milliGRAM(s) IV Push every 24 hours  piperacillin/tazobactam IVPB.. 3.375 Gram(s) IV Intermittent every 8 hours  psyllium Powder 1 Packet(s) Enteral Tube every 12 hours  senna 2 Tablet(s) Oral at bedtime      Physical Exam  General: Patient comfortable in bed  Vital Signs Last 12 Hrs  T(F): 97.8 (08-06-22 @ 16:00), Max: 97.8 (08-06-22 @ 08:40)  HR: 72 (08-06-22 @ 16:00) (67 - 75)  BP: 126/69 (08-06-22 @ 16:00) (89/56 - 126/69)  BP(mean): --  RR: 18 (08-06-22 @ 16:00) (18 - 18)  SpO2: 99% (08-06-22 @ 16:00) (96% - 99%)  Neck: No palpable thyroid nodules.  CVS: S1S2, No murmurs  Respiratory: No wheezing, no crepitations  GI: Abdomen soft, bowel sounds positive  Musculoskeletal:  edema lower extremities.     Diagnostics

## 2022-08-06 NOTE — PROGRESS NOTE ADULT - ASSESSMENT
[facility] 6/23/21:   s/p successful angioplasty to the mid RCA followed by intracoronary brachytherapy.  [facility] 4/14/21: POBA of mid RCA (80%)   [facility] 3/25/21: PCI/POBA to pCX 90%   Echo 7/22/22: Normal left ventricular internal dimensions with discrete upper septal hypertrophy. Estimated ejection fraction 55%, The inferolateral wall is akinetic.  Echo 8/2/22:  grossly, preserved left ventricular systolic function with segmental wall motion abnormalities. EF approximately 55%. The mid to distal inferolateral and mid to distal inferior segments are hypkinetic. The apex is akinetic. No left ventricular thrombus seen.      a/p  81 y/o female (former smoker) with PMHx of HTN, CAD sp PCi,  HLD, MI, peripheral neuropathy, DM and peripheral artery disease presented with abnl imaging on MRI as outpt      #Brain/ Lung  mass   -CT head/ CT chest noted   -work up hem/onc   -neurosx fu noted MRI brain showed 2 mets R temporal 4.7x3.7x3cm L frontal 1.4x1.5x1.2cm   -s/p bronchoscopy  -s/p Right craniotomy for tumor  -management per neuro-- started on antiplt/ hep as mentioned below- repeat CT head  8/3 noted -neurosx to fu   -eeg fu per neuro      #STEMI   -8/1 co cp-  found to have an inferior STEMI  -per cath lab  attending -Not a PCI candidate given potential benefits would be outweighed by the risks of the burden of anticoagulation and antiplatelet agents required for therapy  due to recent brain surgery  -sp  CICU for medical management  -on asa,  c/w hep gtt   -limited echo grossly, preserved left ventricular systolic function with segmental wall motion abnormalities. EF approximately 55%. The mid to distal inferolateral and mid to distal inferior segments are hypkinetic. The apex is akinetic. No left ventricular thrombus seen.  -c/w statin ,  bb, arb  -monitor ct head - neuro sx following     #acute PE   -c/w hep gtt     #new PAFIB   -now in NSR   -c.w BB   -ac as mentioned above     #CAD sp PCI   -antiplt/ac as mentioned above  -echo w inflat akinesis and overall preserved EF  -repeat limited echo as mentioned above   -c/w BB     #HTN  -hypotension noted- decrease ARB  if needed-- management per  NS    35 minutes spent on total encounter; more than 50% of the visit was spent counseling and/or coordinating care by the attending physician.

## 2022-08-06 NOTE — PROGRESS NOTE ADULT - SUBJECTIVE AND OBJECTIVE BOX
CC: f/u for possible sepsis    Patient reports: she is alert, has some word finding difficulty, is not short of breath    REVIEW OF SYSTEMS:  All other review of systems negative (Comprehensive ROS): no headache , shortness of breath,GI or  complaints    Antimicrobials Day #  :day 4  piperacillin/tazobactam IVPB.. 3.375 Gram(s) IV Intermittent every 8 hours    Other Medications Reviewed  MEDICATIONS  (STANDING):  acetylcysteine 10%  Inhalation 4 milliLiter(s) Inhalation every 6 hours  aspirin  chewable 81 milliGRAM(s) Oral daily  atorvastatin 80 milliGRAM(s) Oral at bedtime  BACItracin   Ointment 1 Application(s) Topical two times a day  brivaracetam  IVPB 100 milliGRAM(s) IV Intermittent two times a day  dexAMETHasone  Injectable 1 milliGRAM(s) IV Push every 12 hours  gabapentin 200 milliGRAM(s) Oral at bedtime  heparin  Infusion 850 Unit(s)/Hr (8.5 mL/Hr) IV Continuous <Continuous>  insulin lispro (ADMELOG) corrective regimen sliding scale   SubCutaneous every 6 hours  insulin NPH human recombinant 16 Unit(s) SubCutaneous every 6 hours  insulin NPH human recombinant 6 Unit(s) SubCutaneous once  losartan 100 milliGRAM(s) Oral daily  metoprolol tartrate 12.5 milliGRAM(s) Oral two times a day  pantoprazole  Injectable 40 milliGRAM(s) IV Push every 24 hours  piperacillin/tazobactam IVPB.. 3.375 Gram(s) IV Intermittent every 8 hours  psyllium Powder 1 Packet(s) Enteral Tube every 12 hours  senna 2 Tablet(s) Oral at bedtime  sodium chloride 3%  Inhalation 4 milliLiter(s) Inhalation every 6 hours    T(F): 98.2 (08-06-22 @ 05:01), Max: 98.9 (08-05-22 @ 21:54)  HR: 75 (08-06-22 @ 05:01)  BP: 102/67 (08-06-22 @ 05:01)  RR: 18 (08-06-22 @ 05:01)  SpO2: 97% (08-06-22 @ 05:01)  Wt(kg): --    PHYSICAL EXAM:  General: alert, no acute distress, craniotomy incision intact  Eyes:  anicteric, no conjunctival injection, no discharge, forehead abrasion  Oropharynx: no lesions or injection 	  Neck: supple, without adenopathy  Lungs: clear to auscultation  Heart: regular rate and rhythm; no murmur, rubs or gallops  Abdomen: soft, nondistended, nontender, without mass or organomegaly  Skin: no lesions  Extremities: no clubbing, cyanosis, or edema  Neurologic: alert, oriented, moves all extremities    LAB RESULTS:                        14.0   16.18 )-----------( 229      ( 04 Aug 2022 21:53 )             44.3     08-04    135  |  99  |  42<H>  ----------------------------<  274<H>  4.7   |  20<L>  |  1.05    Ca    9.3      04 Aug 2022 21:53  Phos  4.0     08-04  Mg     2.4     08-04    TPro  6.7  /  Alb  3.0<L>  /  TBili  0.5  /  DBili  x   /  AST  37  /  ALT  22  /  AlkPhos  79  08-04    LIVER FUNCTIONS - ( 04 Aug 2022 21:53 )  Alb: 3.0 g/dL / Pro: 6.7 g/dL / ALK PHOS: 79 U/L / ALT: 22 U/L / AST: 37 U/L / GGT: x             MICROBIOLOGY:  RECENT CULTURES:  08-03 @ 13:05 Catheterized Catheterized     >100,000 CFU/ml Escherichia coli  >100,000 CFU/ml Klebsiella pneumoniae      08-03 @ 10:59 .Blood Blood     No growth to date.      08-03 @ 10:58 .Blood Blood     No growth to date.          RADIOLOGY REVIEWED:  < from: US Abdomen Complete (US Abdomen Complete .) (08.04.22 @ 15:00) >  IMPRESSION:    Mildly distended gallbladder. No evidence of acute cholecystitis.    Possible mild hydronephrosis.    --- End of Report ---    < end of copied text >  < from: Xray Chest 1 View- PORTABLE-Urgent (Xray Chest 1 View- PORTABLE-Urgent .) (08.04.22 @ 11:32) >  FINDINGS:    Clear lungs. No pleural effusions or pneumothorax. Cardiac silhouette is   unchanged. No acute osseous pathology. Partially imaged right shoulder   joint prosthesis. Enteric tube terminates in the stomach.      IMPRESSION:    Enteric tube is in a good position.    --- End of Report ---    < end of copied text >  < from: CT Angio Chest PE Protocol w/ IV Cont (08.03.22 @ 09:59) >  IMPRESSION:  Pulmonary embolism in the lateral segmental branch of the right middle   lobe. No evidence of right heart strain.    Interval decrease in left upper lobe mass. Unchanged obstruction of the   posterior bronchus, and stable fibroglandular lymph node.    COMMUNICATION:  Discussed positive PE findings with [facility] DAVID Poon by Dr. Joycelyn Rebollar at 12:15 PM on 8/3/2022.    < end of copied text >

## 2022-08-06 NOTE — PROGRESS NOTE ADULT - SUBJECTIVE AND OBJECTIVE BOX
LANIE BERTRAND  MRN-[id number]    Patient is a 82y old  Female who presents with a chief complaint of abnormal imaging (06 Aug 2022 20:03)      Review of System  REVIEW OF SYSTEMS      General:	Denies fatigue, fevers, chills, sweats, decreased appetite.    Skin/Breast: denies pruritis, rash  	  Ophthalmologic: no change in vision or blurring  	  HEENT	Denies dry mouth, oral sores, dysphagia,  change in hearing.    Respiratory and Thorax:  cough, sob, wheeze, hemoptysis  	  Cardiovascular:	no cp , palp, orthopnea    Gastrointestinal:	no n/v/d constipation    Genitourinary:	no dysuria of frequency, no hematuria, no flank pain    Musculoskeletal:	no bone or joint pain. no muscle aches.     Neurological:	no change in sensory or motor function. no headache. no weakness.     Psychiatric:	no depression, no anxiety, insomnia.     Hematology/Lymphatics:	no bleeding or bruising        Current Meds  MEDICATIONS  (STANDING):  aspirin  chewable 81 milliGRAM(s) Oral daily  atorvastatin 80 milliGRAM(s) Oral at bedtime  BACItracin   Ointment 1 Application(s) Topical two times a day  brivaracetam  IVPB 100 milliGRAM(s) IV Intermittent two times a day  dexAMETHasone  Injectable 1 milliGRAM(s) IV Push every 12 hours  gabapentin 200 milliGRAM(s) Oral at bedtime  heparin  Infusion 750 Unit(s)/Hr (7.5 mL/Hr) IV Continuous <Continuous>  insulin lispro (ADMELOG) corrective regimen sliding scale   SubCutaneous every 6 hours  insulin NPH human recombinant 18 Unit(s) SubCutaneous every 6 hours  losartan 100 milliGRAM(s) Oral daily  metoprolol tartrate 12.5 milliGRAM(s) Oral two times a day  pantoprazole  Injectable 40 milliGRAM(s) IV Push every 24 hours  piperacillin/tazobactam IVPB.. 3.375 Gram(s) IV Intermittent every 8 hours  psyllium Powder 1 Packet(s) Enteral Tube every 12 hours  senna 2 Tablet(s) Oral at bedtime    MEDICATIONS  (PRN):  melatonin 5 milliGRAM(s) Oral at bedtime PRN Sleep      Vitals  Vital Signs Last 24 Hrs  T(C): 36.5 (06 Aug 2022 21:00), Max: 36.9 (06 Aug 2022 00:13)  T(F): 97.7 (06 Aug 2022 21:00), Max: 98.5 (06 Aug 2022 00:13)  HR: 69 (06 Aug 2022 21:00) (67 - 75)  BP: 115/62 (06 Aug 2022 21:00) (89/56 - 126/69)  BP(mean): --  RR: 18 (06 Aug 2022 21:00) (18 - 19)  SpO2: 98% (06 Aug 2022 21:00) (96% - 99%)    Parameters below as of 06 Aug 2022 21:00  Patient On (Oxygen Delivery Method): room air        Physical Exam  PHYSICAL EXAM:      Constitutional: NAD    Eyes: PERRLA EOMI, anicteric sclera    Heent :No oral sores, no pharyngeal injection. moist mucosa.    Neck: supple, no jvd, no LAD    Respiratory: CTA b/l     Cardiovascular: s1s2, no m/g/r    Gastrointestinal: soft, nt, nd, + BS    Extremities: no c/c/e    Neurological:A&O x 3 moves all ext.    Skin: no rash on exposed skin    Lymph Nodes: no lymphadenopathy.              Lab  CBC Full  -  ( 06 Aug 2022 23:04 )  WBC Count : 13.06 K/uL  RBC Count : 3.36 M/uL  Hemoglobin : 10.6 g/dL  Hematocrit : 32.9 %  Platelet Count - Automated : 248 K/uL  Mean Cell Volume : 97.9 fl  Mean Cell Hemoglobin : 31.5 pg  Mean Cell Hemoglobin Concentration : 32.2 gm/dL  Auto Neutrophil # : x  Auto Lymphocyte # : x  Auto Monocyte # : x  Auto Eosinophil # : x  Auto Basophil # : x  Auto Neutrophil % : x  Auto Lymphocyte % : x  Auto Monocyte % : x  Auto Eosinophil % : x  Auto Basophil % : x    08-06    141  |  103  |  46<H>  ----------------------------<  215<H>  4.5   |  27  |  1.01    Ca    8.9      06 Aug 2022 06:36      PTT - ( 06 Aug 2022 23:04 )  PTT:62.6 sec    Rad:    Assessment/Plan   LANIE BERTRAND  MRN-[id number]    Patient is a 82y old  Female who presents with a chief complaint of abnormal imaging (06 Aug 2022 20:03)      Review of System    Resting comfortably	    Current Meds  MEDICATIONS  (STANDING):  aspirin  chewable 81 milliGRAM(s) Oral daily  atorvastatin 80 milliGRAM(s) Oral at bedtime  BACItracin   Ointment 1 Application(s) Topical two times a day  brivaracetam  IVPB 100 milliGRAM(s) IV Intermittent two times a day  dexAMETHasone  Injectable 1 milliGRAM(s) IV Push every 12 hours  gabapentin 200 milliGRAM(s) Oral at bedtime  heparin  Infusion 750 Unit(s)/Hr (7.5 mL/Hr) IV Continuous <Continuous>  insulin lispro (ADMELOG) corrective regimen sliding scale   SubCutaneous every 6 hours  insulin NPH human recombinant 18 Unit(s) SubCutaneous every 6 hours  losartan 100 milliGRAM(s) Oral daily  metoprolol tartrate 12.5 milliGRAM(s) Oral two times a day  pantoprazole  Injectable 40 milliGRAM(s) IV Push every 24 hours  piperacillin/tazobactam IVPB.. 3.375 Gram(s) IV Intermittent every 8 hours  psyllium Powder 1 Packet(s) Enteral Tube every 12 hours  senna 2 Tablet(s) Oral at bedtime    MEDICATIONS  (PRN):  melatonin 5 milliGRAM(s) Oral at bedtime PRN Sleep      Vitals  Vital Signs Last 24 Hrs  T(C): 36.5 (06 Aug 2022 21:00), Max: 36.9 (06 Aug 2022 00:13)  T(F): 97.7 (06 Aug 2022 21:00), Max: 98.5 (06 Aug 2022 00:13)  HR: 69 (06 Aug 2022 21:00) (67 - 75)  BP: 115/62 (06 Aug 2022 21:00) (89/56 - 126/69)  BP(mean): --  RR: 18 (06 Aug 2022 21:00) (18 - 19)  SpO2: 98% (06 Aug 2022 21:00) (96% - 99%)    Parameters below as of 06 Aug 2022 21:00  Patient On (Oxygen Delivery Method): room air        Physical Exam    Nad    Lab  CBC Full  -  ( 06 Aug 2022 23:04 )  WBC Count : 13.06 K/uL  RBC Count : 3.36 M/uL  Hemoglobin : 10.6 g/dL  Hematocrit : 32.9 %  Platelet Count - Automated : 248 K/uL  Mean Cell Volume : 97.9 fl  Mean Cell Hemoglobin : 31.5 pg  Mean Cell Hemoglobin Concentration : 32.2 gm/dL  Auto Neutrophil # : x  Auto Lymphocyte # : x  Auto Monocyte # : x  Auto Eosinophil # : x  Auto Basophil # : x  Auto Neutrophil % : x  Auto Lymphocyte % : x  Auto Monocyte % : x  Auto Eosinophil % : x  Auto Basophil % : x    08-06    141  |  103  |  46<H>  ----------------------------<  215<H>  4.5   |  27  |  1.01    Ca    8.9      06 Aug 2022 06:36      PTT - ( 06 Aug 2022 23:04 )  PTT:62.6 sec    Rad:    Assessment/Plan

## 2022-08-06 NOTE — PROGRESS NOTE ADULT - SUBJECTIVE AND OBJECTIVE BOX
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; pulmonary embolism; STEMI; lethargy    INTERVAL HISTORY: transferred to the neurology floor; much more awake and alert chatting with her family with much  more fluent speech; continues on ASA and heparin gtt without a change in brain CT following a NSTEMI; no shortness of breath or hypoxemia on room air; no cough, sputum production, hemoptysis, chest congestion or wheeze; no fevers, chills or sweats; no chest pain/pressure or palpitations; s/p bronchoscopy with endobronchial brushings/biopsies of an obstructing left upper lobe endobronchial lesion -> 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; no seizures on EEG; receiving NGT feeds due to dysphagia    REVIEW OF SYSTEMS:  Constitutional: As per interval history  HEENT: Within normal limits  CV: As per interval history  Resp: As per interval history  GI: Within normal limits   : Within normal limits  Musculoskeletal: Within normal limits  Skin: Within normal limits  Neurological: lethargy -> improving  Psychiatric: Within normal limits  Endocrine: Within normal limits  Hematologic/Lymphatic: 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 3.375 Gram(s) IV Intermittent every 8 hours    Cardiovascular:  losartan 100 milliGRAM(s) Oral daily  metoprolol tartrate 12.5 milliGRAM(s) Oral two times a day    Other:  aspirin  chewable 81 milliGRAM(s) Oral daily  atorvastatin 80 milliGRAM(s) Oral at bedtime  BACItracin   Ointment 1 Application(s) Topical two times a day  brivaracetam  IVPB 100 milliGRAM(s) IV Intermittent two times a day  dexAMETHasone  Injectable 1 milliGRAM(s) IV Push every 12 hours  gabapentin 200 milliGRAM(s) Oral at bedtime  heparin  Infusion 750 Unit(s)/Hr IV Continuous <Continuous>  insulin lispro (ADMELOG) corrective regimen sliding scale   SubCutaneous every 6 hours  insulin NPH human recombinant 18 Unit(s) SubCutaneous every 6 hours  pantoprazole  Injectable 40 milliGRAM(s) IV Push every 24 hours  psyllium Powder 1 Packet(s) Enteral Tube every 12 hours  senna 2 Tablet(s) Oral at bedtime    MEDICATIONS  (PRN):  melatonin 5 milliGRAM(s) Oral at bedtime PRN Sleep    OBJECTIVE:    POCT Blood Glucose.: 263 mg/dL (06 Aug 2022 10:44)  POCT Blood Glucose.: 217 mg/dL (06 Aug 2022 05:11)  POCT Blood Glucose.: 257 mg/dL (06 Aug 2022 00:24)  POCT Blood Glucose.: 279 mg/dL (05 Aug 2022 21:57)  POCT Blood Glucose.: 350 mg/dL (05 Aug 2022 16:00)      PHYSICAL EXAM:       ICU Vital Signs Last 24 Hrs  T(C): 36.4 (06 Aug 2022 12:04), Max: 37.2 (05 Aug 2022 21:54)  T(F): 97.6 (06 Aug 2022 12:04), Max: 98.9 (05 Aug 2022 21:54)  HR: 75 (06 Aug 2022 12:04) (68 - 81)  BP: 89/56 (06 Aug 2022 12:04) (89/56 - 132/58)  BP(mean): 83 (05 Aug 2022 17:00) (77 - 84)  ABP: --  ABP(mean): --  RR: 18 (06 Aug 2022 12:04) (18 - 27)  SpO2: 98% (06 Aug 2022 12:04) (94% - 100%) on room air    General: Awake. Alert. Cooperative. No distress. Appears stated age. Sitting in the chair  HEENT:  Right craniotomy incision stapled. Normocephalic. Anicteric. Normal oral mucosa. PERRL. EOMI.  Neck: Supple. Trachea midline. Thyroid without enlargement/tenderness/nodules. No carotid bruit. No JVD.	  Cardiovascular: Regular rate and rhythm. S1 S2 normal. No murmurs, rubs or gallops.  Respiratory: Respirations unlabored. Clear to auscultation and percussion bilaterally. No curvature.  Abdomen: Soft. Non-tender. Non-distended. No organomegaly. No masses. Normal bowel sounds.  Extremities: Warm to touch. No clubbing or cyanosis. No pedal edema.   Pulses: 2+ peripheral pulses all extremities.	  Skin: Craniotomy scar C/D/I. Abrasion on the forehead at the site of the craniotomy dressing  Lymph Nodes: Cervical, supraclavicular and axillary nodes normal  Neurological: Moving all extremities. A and O x 3.  Psychiatry: Appropriate mood and affect.    LABS:                          10.6   12.70 )-----------( 228      ( 06 Aug 2022 06:36 )             33.4     CBC    WBC  12.70 <==, 16.18 <==, 17.41 <==, 20.39 <==, 18.86 <==, 16.24 <==, 17.37 <==    Hemoglobin  10.6 <<==, 14.0 <<==, 12.4 <<==, 11.5 <<==, 13.2 <<==, 13.3 <<==, 13.4 <<==    Hematocrit  33.4 <==, 44.3 <==, 38.9 <==, 34.9 <==, 40.4 <==, 40.7 <==, 40.5 <==    Platelets  228 <==, 229 <==, 219 <==, 182 <==, 230 <==, 267 <==, 303 <==      141  |  103  |  46<H>  ----------------------------<  215<H>    0806  4.5   |  27  |  1.01      LYTES    sodium  141 <==, 135 <==, 140 <==, 137 <==, 140 <==, 141 <==, 136 <==    potassium   4.5 <==, 4.7 <==, 4.5 <==, 4.3 <==, 4.5 <==, 4.5 <==, 4.2 <==    chloride  103 <==, 99 <==, 103 <==, 100 <==, 102 <==, 102 <==, 94 <==    carbon dioxide  27 <==, 20 <==, 25 <==, 24 <==, 25 <==, 28 <==, 25 <==    =============================================================================================  RENAL FUNCTION:    Creatinine:   1.01  <<==, 1.05  <<==, 1.13  <<==, 1.06  <<==, 1.28  <<==, 1.23  <<==, 1.24  <<==    BUN:   46 <==, 42 <==, 44 <==, 41 <==, 56 <==, 60 <==, 55 <==    ============================================================================================    calcium   8.9 <==, 9.3 <==, 9.0 <==, 8.2 <==, 8.9 <==, 8.8 <==, 9.0 <==    phos   4.0 <==, 4.6 <==, 5.3 <==, 2.7 <==, 3.3 <==, 3.4 <==    mag   2.4 <==, 2.5 <==, 1.8 <==, 2.2 <==, 1.7 <==, 1.6 <==    ============================================================================================  LFTs    AST:   37 <== , 48 <== , 57 <== , 53 <== , 25 <==     ALT:  22  <== , 17  <== , 16  <== , 14  <== , 16  <==     AP:  79  <=, 57  <=, 66  <=, 63  <=, 75  <=    Bili:  0.5  <=, 0.4  <=, 0.2  <=, 0.2  <=, 0.2  <=    PT/INR - ( 04 Aug 2022 21:53 )   PT: 11.4 sec;   INR: 0.99 ratio       PTT - ( 06 Aug 2022 06:33 )  PTT:90.4 sec    Venous Blood Gas:   @ 21:40  7.37/49/21/28/23.0  VBG Lactate: 2.3    Procalcitonin, Serum: 0.11 ng/mL ( @ 07:45)    Serum Pro-Brain Natriuretic Peptide: 4009 pg/mL ( @ 21:53)  Serum Pro-Brain Natriuretic Peptide: 7071 pg/mL ( @ 04:20)  Serum Pro-Brain Natriuretic Peptide: 7944 pg/mL ( @ 07:45)  Serum Pro-Brain Natriuretic Peptide: 7011 pg/mL ( @ 04:07)  Serum Pro-Brain Natriuretic Peptide: 1286 pg/mL ( @ 20:29)    CARDIAC MARKERS ( 04 Aug 2022 21:53 )  CPK x     /CKMB x     /CKMB Units x        troponin 1633 ng/L    CARDIAC MARKERS ( 04 Aug 2022 15:08 )  CPK x     /CKMB x     /CKMB Units x        troponin 1535 ng/L    CARDIAC MARKERS ( 04 Aug 2022 04:20 )   U/L /CKMB x     /CKMB Units 18.9 ng/mL    troponin 1971 ng/L    CARDIAC MARKERS ( 03 Aug 2022 08:57 )   U/L /CKMB x     /CKMB Units 29.3 ng/mL    troponin 1121 ng/L    CARDIAC MARKERS ( 03 Aug 2022 07:45 )   U/L /CKMB x     /CKMB Units 32.0 ng/mL    troponin 1120 ng/L    CARDIAC MARKERS ( 02 Aug 2022 17:12 )   U/L /CKMB x     /CKMB Units 63.2 ng/mL    troponin 948 ng/L  CARDIAC MARKERS ( 02 Aug 2022 11:09 )   U/L /CKMB x     /CKMB Units 72.7 ng/mL    troponin 961 ng/L    CARDIAC MARKERS ( 02 Aug 2022 04:07 )   U/L /CKMB x     /CKMB Units 57.1 ng/mL    troponin 622 ng/L    CARDIAC MARKERS ( 01 Aug 2022 20:29 )   U/L /CKMB x     /CKMB Units 19.7 ng/mL    troponin 285 ng/L    CARDIAC MARKERS ( 01 Aug 2022 15:26 )  CPK 78 U/L /CKMB x     /CKMB Units 2.0 ng/mL    troponin 22 ng/L    < from: Limited Transthoracic Echo (w/Cont) (22 @ 08:02) >    Patient name: LANIE BERTRAND  YOB: 1940   Age: 82 (F)   MR#: [id number]  Study Date: 2022  ------------------------------------------------------------------------  Conclusions:  1. Endocardial visualization enhanced with intravenous  injection of Ultrasonic Enhancing Agent (Lumason). Left  ventricle not well visualized despite intravenous  ultrasonic enhancing agent; grossly, preserved left  ventricular systolic function with segmental wall motion  abnormalities. EF approximately 55%. The mid to distal  inferolateral and mid to distal inferior segments are  hypkinetic. The apex is akinetic. No left ventricular  thrombus seen.  *** Compared with echocardiogram of 2022, results are  similar on today's study. The mid to distal inferior and  the apex appear hypo/akinetic on today's study on images  with intravenous ultrasonic enhancing agent.  ------------------------------------------------------------------------  Confirmed on  2022 - 10:57:19 by Kelsie Becker M.D.  ------------------------------------------------------------------------  ------------------------------------------------------------------------  Surgical Pathology Report (22 @ 11:53)   Surgical Pathology Report:   1. Left mainstem bronchus biopsy   Final Diagnosis   Bronchus, mainstem, left, biopsy:   In summary the morphology and immunophenotype are consistent with poorly   differentiated adenocarcinoma of lung origin.   Dr. Hopkins was notified of the diagnosis on 2022.   ------------------------------------------------------------------------------  MICROBIOLOGY:   Urinalysis Basic - ( 03 Aug 2022 08:40 )    Color: Light Yellow / Appearance: Clear / S.015 / pH: x  Gluc: x / Ketone: Trace  / Bili: Negative / Urobili: Negative   Blood: x / Protein: Negative / Nitrite: Negative   Leuk Esterase: Large / RBC: 1 /hpf / WBC 51 /HPF   Sq Epi: x / Non Sq Epi: 0 /hpf / Bacteria: Many    Culture - Urine (22 @ 13:05)   Specimen Source: Catheterized Catheterized   Culture Results:   >100,000 CFU/ml Escherichia coli   >100,000 CFU/ml Klebsiella pneumoniae     Culture - Blood (22 @ 10:59)   Specimen Source: .Blood Blood   Culture Results:   No growth to date.     Culture - Blood (22 @ 10:58)   Specimen Source: .Blood Blood   Culture Results:   No growth to date.     MRSA/MSSA PCR (22 @ 07:20)   MRSA PCR Result.: NotDetefelicia  Staph aureus PCR Result: NotDetec     RADIOLOGY:    EXAM:  CT ANGIO CHEST PULM JULIO BONE                          PROCEDURE DATE:  2022      FINDINGS:    PULMONARY ANGIOGRAM: Pulmonary embolism in the lateral segmental branch   of the right middle lobe without evidence of right heart strain. Multiple   segmental branches in the left lung are obscured by motion.    LYMPH NODES: Stable 1.2 cm left hilar lymph node.    HEART/VASCULATURE: The heart is normal in size. No pericardial effusion.   There are aortic, aortic valve, and coronary artery calcifications.    AIRWAYS/LUNGS/PLEURA: Patent central airways. Left upper lobe mass   measuring 2.7 x 2.1 cm, series 5 image 50, decreased in size from prior,   and unchanged obstruction left apical posterior bronchus, and associated   segmental atelectasis along the mediastinum. No pleural effusion or   pneumothorax.    UPPER ABDOMEN: Within normal limits.    BONES/SOFT TISSUES: Stable multinodular thyroid. Metallic hardware in the   right humeral head and shaft. Degenerative changes of the spine.    IMPRESSION:  Pulmonary embolism in the lateral segmental branch of the right middle   lobe. No evidence of right heart strain.    Interval decrease in left upper lobe mass. Unchanged obstruction of the   posterior bronchus, and stable fibroglandular lymph node.    COMMUNICATION:  Discussed positive PE findings with [facility] DAVID Poon by Dr. Michelle Rebollar at 12:15 PM on 8/3/2022.    MICHELLE JAUREGUI MD; Resident Radiologist  This document has been electronically signed.  NEHA BAILON M.D., ATTENDINGRADIOGIST  This document has been electronically signed. Aug  3 2022 12:35PM  ---------------------------------------------------------------------------------------------------------------  EXAM:  CT BRAIN                          PROCEDURE DATE:  2022      IMPRESSION:    Redemonstration of right temporal craniotomy for prior resection of a   lesion. There is a large degree of hypoattenuation throughout the right   temporal lobe and extending into the right basal ganglia and right   parietal lobe, suggestive of a combination of vasogenic edema and/or   encephalomalacia/gliosis. This results in secondary mass effect upon the   parenchyma of the right cerebral hemisphere, not significantly changed.   There is mild mass effect on the ventricular system, right greater than   left. There is secondary right to left midline shift of approximately 5.7   mm. Expected postsurgical extra-axial pneumocephalus is reidentified   without significant change. Tiny expected subdural hematoma overlying the   right anterior superior frontal lobe is reidentified without change.    Reidentified is a hypoattenuating lesion within the left medial frontal   lobe measuring 1.4 x 1.6 cm, compatible with known additional metastasis.   No significant surrounding edema.    NERI CERNA MD; Resident Radiologist  This document has been electronically signed.  ALTAF SEBASTIAN MD; Attending Radiologist  This document has been electronically signed. Aug  3 2022 10:23AM  ---------------------------------------------------------------------------------------------------------------  EXAM:  CT ABDOMEN AND PELVIS IC                        EXAM:  CT CHEST IC                          PROCEDURE DATE:  2022      FINDINGS:  CHEST:  LUNGS AND LARGE AIRWAYS: There is a heterogeneous left upper lobe mass   approximately measuring 3.3 x 2.6 cm (2, 28) obstructing the left apical   posterior bronchus with partial atelectasis of the left upper lobe. Few   scattered bilateral pulmonary nodules measuring up to 3 mm in the right   upper lobe (2, 39).  PLEURA: No pleural effusion.  VESSELS: Atherosclerotic changes of the aorta and coronary arteries.  HEART: Heart size is normal. No pericardial effusion.  MEDIASTINUM AND SIXTO: Soft tissue contiguous with mass versus adenopathy   measuring 1.7 x 1.5 cm (2, 33).  CHEST WALL AND LOWER NECK: Bilateral subcentimeter hypoattenuating   thyroid nodules.    ABDOMEN AND PELVIS:  LIVER: Within normal limits.  BILE DUCTS: Normal caliber.  GALLBLADDER: Within normal limits.  SPLEEN: Within normal limits.  PANCREAS: Within normal limits.  ADRENALS: Indeterminant left adrenal nodule measuring 1.2 cm.  KIDNEYS/URETERS: No hydronephrosis. Renal cysts and subcentimeter   hypoattenuating foci bilaterally, too small to characterize.    BLADDER: Within normal limits.  REPRODUCTIVE ORGANS: Uterus and adnexa within normal limits.    BOWEL: Colonic diverticula withoutevidence of acute diverticulitis. No   bowel obstruction. Appendix is normal.  PERITONEUM: No ascites.  VESSELS: Atherosclerotic changes.  RETROPERITONEUM/LYMPH NODES: No lymphadenopathy.  ABDOMINAL WALL: Within normal limits.  BONES: Degenerative changes. Grade 1 anterolisthesis of L5 on S1. Status   post right shoulder arthroplasty.    IMPRESSION:  Left upper lobe lung mass with partial atelectasis of the left upper   lobe, concerning for primary lung neoplasm. Few scattered sub-4 mm   pulmonary nodules, indeterminate.    Indeterminant left adrenal nodule for which contrast enhanced MRI is   recommended for further evaluation.    DEEPAK MORALES MD; Resident Radiologist  This document has been electronically signed.  HAYLEY DENTON MD; Attending Radiologist  This document has been electronically signed. 2022  9:02AM  ---------------------------------------------------------------------------------------------------------------  EXAM:  DUPLEX SCAN EXT VEINS LOWER BI                          PROCEDURE DATE:  2022      IMPRESSION:  No evidence of deep venous thrombosis in either lower extremity.    YENIEFR QUAN MD; Attending Radiologist  This document has been electronically signed. 2022  2:08PM  ---------------------------------------------------------------------------------------------------------------

## 2022-08-06 NOTE — PROGRESS NOTE ADULT - SUBJECTIVE AND OBJECTIVE BOX
SUBJECTIVE: Pt seen and examined, resting comfortably in bed, daughter in law and grand daughter at bedside, pt says she wants to go home    OVERNIGHT EVENTS: none    Vital Signs Last 24 Hrs  T(C): 36.6 (06 Aug 2022 16:00), Max: 37.2 (05 Aug 2022 21:54)  T(F): 97.8 (06 Aug 2022 16:00), Max: 98.9 (05 Aug 2022 21:54)  HR: 72 (06 Aug 2022 16:00) (67 - 81)  BP: 126/69 (06 Aug 2022 16:00) (89/56 - 126/69)  BP(mean): 83 (05 Aug 2022 17:00) (83 - 83)  RR: 18 (06 Aug 2022 16:00) (18 - 26)  SpO2: 99% (06 Aug 2022 16:00) (94% - 99%)    Parameters below as of 06 Aug 2022 16:00  Patient On (Oxygen Delivery Method): room air        PHYSICAL EXAM:    General: No Acute Distress     Neurological: Awake, alert oriented to person, place and time, Following Commands, Face Symmetrical, Speech Fluent, Moving all extremities spont/ag/purposeful, No Drift, Sensation to Light Touch Intact    Pulmonary: Clear to Auscultation, No Rales, No Rhonchi, No Wheezes     Cardiovascular: S1, S2, Regular Rate and Rhythm     Gastrointestinal: Soft, Nontender, Nondistended, + NGT    Incision: cranial staples c/d/i    LABS:                        10.6   12.70 )-----------( 228      ( 06 Aug 2022 06:36 )             33.4    08-06    141  |  103  |  46<H>  ----------------------------<  215<H>  4.5   |  27  |  1.01    Ca    8.9      06 Aug 2022 06:36  Phos  4.0     08-04  Mg     2.4     08-04    TPro  6.7  /  Alb  3.0<L>  /  TBili  0.5  /  DBili  x   /  AST  37  /  ALT  22  /  AlkPhos  79  08-04  PT/INR - ( 04 Aug 2022 21:53 )   PT: 11.4 sec;   INR: 0.99 ratio         PTT - ( 06 Aug 2022 14:38 )  PTT:67.6 sec      08-05 @ 07:01 - 08-06 @ 07:00  --------------------------------------------------------  IN: 717.5 mL / OUT: 902 mL / NET: -184.5 mL    08-06 @ 07:01  - 08-06 @ 16:33  --------------------------------------------------------  IN: 0 mL / OUT: 600 mL / NET: -600 mL        MEDICATIONS:  Antibiotics:  piperacillin/tazobactam IVPB.. 3.375 Gram(s) IV Intermittent every 8 hours    Neuro:  brivaracetam  IVPB 100 milliGRAM(s) IV Intermittent two times a day  gabapentin 200 milliGRAM(s) Oral at bedtime  melatonin 5 milliGRAM(s) Oral at bedtime PRN Sleep    Cardiac:  losartan 100 milliGRAM(s) Oral daily  metoprolol tartrate 12.5 milliGRAM(s) Oral two times a day    Pulm:    GI/:  pantoprazole  Injectable 40 milliGRAM(s) IV Push every 24 hours  psyllium Powder 1 Packet(s) Enteral Tube every 12 hours  senna 2 Tablet(s) Oral at bedtime    Other:   aspirin  chewable 81 milliGRAM(s) Oral daily  atorvastatin 80 milliGRAM(s) Oral at bedtime  BACItracin   Ointment 1 Application(s) Topical two times a day  dexAMETHasone  Injectable 1 milliGRAM(s) IV Push every 12 hours  heparin  Infusion 750 Unit(s)/Hr IV Continuous <Continuous>  insulin lispro (ADMELOG) corrective regimen sliding scale   SubCutaneous every 6 hours  insulin NPH human recombinant 18 Unit(s) SubCutaneous every 6 hours    DIET: [] Regular [] CCD [] Renal [] Puree [] Dysphagia [x] Tube Feeds:     IMAGING:   < from: CT Angio Chest PE Protocol w/ IV Cont (08.03.22 @ 09:59) >  IMPRESSION:  Pulmonary embolism in the lateral segmental branch of the right middle   lobe. No evidence of right heart strain.    Interval decrease in left upper lobe mass. Unchanged obstruction of the   posterior bronchus, and stable fibroglandular lymph node.    < end of copied text >  < from: CT Head No Cont (08.03.22 @ 04:38) >    IMPRESSION:    Redemonstration of right temporal craniotomy for prior resection of a   lesion. There is a large degree of hypoattenuation throughout the right   temporal lobe and extending into the right basal ganglia and right   parietal lobe, suggestive of a combination of vasogenic edema and/or   encephalomalacia/gliosis. This results in secondary mass effect upon the   parenchyma of the right cerebral hemisphere, not significantly changed.   There is mild mass effect on the ventricular system, right greater than   left. There is secondary right to left midline shift of approximately 5.7   mm. Expected postsurgical extra-axial pneumocephalus is reidentified   without significant change. Tiny expected subdural hematoma overlying the   right anterior superior frontal lobe is reidentified without change.    Reidentified is a hypoattenuating lesion within the left medial frontal   lobe measuring 1.4 x 1.6 cm, compatible with known additional metastasis.   No significant surrounding edema.    < end of copied text >  < from: MR Head w/wo IV Cont (07.29.22 @ 10:48) >  IMPRESSION: Redemonstration of right-sided craniotomy changes status post   resection of a right temporal lobe mass with expected postoperative   changes. Residual hemorrhage and/or postoperative material within the  postoperative bed limits evaluation for enhancement.    Unchanged appearing ring-enhancing lesion in the left parafalcine   location for which a residual metastatic lesion is a possibility.    --- End of Report ---    < end of copied text >  < from: [facility] Duplex Lower Ext Vein Scan, Bilat (07.28.22 @ 12:35) >    IMPRESSION:  No evidence of deep venous thrombosis in either lower extremi    < end of copied text >  < from: TTE with Doppler (w/Cont) (07.22.22 @ 10:09) >  Conclusions:  Endocardial visualization enhanced with intravenous  injection of Ultrasonic Enhancing Agent (Lumason).  Normal left ventricular internal dimensions with discrete  upper septal hypertrophy.  Estimated ejection fraction 55%, The inferolateral wall is  akinetic.    < end of copied text >

## 2022-08-06 NOTE — PROGRESS NOTE ADULT - ASSESSMENT
81 y/o female (former smoker) with PMHx of HTN, CAD, HLD, MI, peripheral neuropathy, DM and peripheral artery disease presented to the ED for imaging for her head due to abnormal spine MRI by her orthopedist (Dr. Castro) during workup for her spine for her chronic back problems. The orthopedist noted something in her brain. Otherwise pt has no symptoms, stated that since 04/2022 she has had gait/balance issues. Endorsed that if she sits for a while and gets up, she feels like she is "drunk" and feels off. Denied chest pain, SOB, fevers, chills, numbness/paresthesias, muscular weakness, dizziness, abdominal pain, headaches.   MR head significant for R temporal lobe lesion with edema, midline shift and uncal herniation concerning for metastatic disease. 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      R crani for metastatic lesion 7/27 consistent w metastatic non small cell lung cancer, residual lesion on L side of brain. Course complicated on the hospital floors by inferolateral STEMI 8/1, pt transferred to CICU and started on hep gtt no bolus and ASA, no cath given high risk from comorbidities and recent surgery.       NSCU consulted for encephalopathy in s/o likely UTI/early urosepsis, rule out seizures    NEURO:  [id number]  CT Head if any mental status changes  Pain control  DC eeg  cont steroids taper per nsgy and keppra per nsgy, no hx of seizures  Activity: [] OOB as tolerated [] Bedrest [x] PT [x] OT [] PMNR    PULM: RA  s/p bronchoscopy with endobronchial brushings/biopsies of an obstructing left upper lobe endobronchial lesion -> 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; No O2 requirements, pulmonary on board  Incentive spirometry, mobilize as tolerated  on 2L NC, wean as tolerated  small incidental PE    CV:  Keep -160mmHg (MAP >65 in the s/o sepsis)  EF 55%  STEMI being treated medically, cont hep gtt goal 55-65 monitor ptt, cont ASA  cardiology following  cont losartan  cont lopressor  new onset afib  off levo    RENAL: borderline PERCY w high BUN, continue hydration if ok w cardiology keep net even to 500cc+  cont straight caths, bladder cieybc1h  See ID for UA+      GI: NGT w TF  Repeat S&S eval  GI prophylaxis x PPI while on steroids  Bowel regimen standing    ENDO: Endo following  outpt FU for adrenal and thyroid abnormalities  ISS, NPH will adjust based in sugars  Goal euglycemia (-180)    HEME/ONC: hep gtt  small RML PE on CTA today  SCDs    ID: on zosyn, 5 day course  WBC high, could be steroid induced hyperglycemia vs new UTI  FU blood and urine cultures  consult ID  vanc empricically  MRSA swab          CODE STATUS:  [x] Full Code [] DNR [] DNI [] Palliative/Comfort Care    DISPOSITION:  [] ICU [] Stroke Unit [x] Floor [] EMU [] RCU [] PCU       POD#    PLAN:  Neuro:   Respiratory:   CV:  Endocrine:   Heme/Onc:             DVT ppx:   Renal:   ID:   GI:    PT/OT:   Will discuss with ____      Spectralink # [id number]  83 y/o female (former smoker) with PMHx of HTN, CAD, HLD, MI, peripheral neuropathy, DM and peripheral artery disease presented to the ED for imaging for her head due to abnormal spine MRI by her orthopedist (Dr. Castro) during workup for her spine for her chronic back problems. The orthopedist noted something in her brain. Otherwise pt has no symptoms, stated that since 04/2022 she has had gait/balance issues. Endorsed that if she sits for a while and gets up, she feels like she is "drunk" and feels off. Denied chest pain, SOB, fevers, chills, numbness/paresthesias, muscular weakness, dizziness, abdominal pain, headaches.   MR head significant for R temporal lobe lesion with edema, midline shift and uncal herniation concerning for metastatic disease. 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      Hospital course: R crani for metastatic lesion 7/27 consistent w metastatic non small cell lung cancer, residual lesion on L side of brain. Course complicated on the hospital floor by inferolateral STEMI 8/1, pt transferred to CICU and started on hep gtt no bolus and ASA, no cath given high risk from comorbidities and recent surgery.       NSCU consulted for encephalopathy in s/o likely UTI/early urosepsis, rule out seizures    NEURO:  - no seizure on EEG  - neur checks q 4h  - -mental status significantly improved  - on zosyn for uti  - on decadron 1q12  - on brivaracetam for seizure      PULM: RA  - s/p bronchoscopy with endobronchial brushings/biopsies of an obstructing left upper lobe endobronchial lesion -> 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; No O2 requirements, pulmonary on board  - Incentive spirometry, mobilize as tolerated  - small incidental PE  - Pulmonary following    CV:  EF 55%  STEMI being treated medically, cont hep gtt goal 55-65, currently PTT 67.6, Hep @ 7.5,  monitor ptt, cont ASA 81  Pulm recs  switch to a DOAC when safe  cardiology following  cont losartan  cont lopressor  on statin  new onset afib  Echo-nmkl LVF with discrete upper septal hypertrophy, EF 55%, inferolateral wall is akinetic    RENAL: borderline PERCY w high BUN, continue hydration if ok w cardiology keep net even to 500cc+  cont straight caths, bladder bilbyu0l  See ID for UA+      GI: NGT w TF  Repeat S&S eval- MBS on Monday  GI prophylaxis x PPI while on steroids  Bowel regimen standing    ENDO: Endo following  outpt FU for adrenal and thyroid abnormalities  ISS, NPH will adjust based in sugars  Goal euglycemia (-180)    HEME/ONC: hep gtt  small RML PE on CTA, no LV thrombus  SCDs    ID: on zosyn, 5 day course  WBC high, could be steroid induced hyperglycemia vs new UTI  FU blood and urine cultures   ID following  MRSA swab     PT/OT:   - Acute rehab      Spectralink # [id number]

## 2022-08-06 NOTE — SPEECH LANGUAGE PATHOLOGY EVALUATION - SLP GENERAL OBSERVATIONS
Pt received at bedside +josé antonio Echeverriain +NGT AOx3, mildly tangential and easily distracted, but cooperative and pleasant throughout evaluation requiring min verbal cueing to re-direct. Able to follow all directives and express all wants/needs. Pt strongly exprssed desire to eat by mouth. . Noted with cognitive-impairments in logic, memory, executive functioning, and mild ataxic dysarthria. +upper dentures which were mildly loose given no adhesive placement today; pt reported she needs to put in adhesive daily. 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

## 2022-08-06 NOTE — PROGRESS NOTE ADULT - ASSESSMENT
ASSESSMENT:    82 year old gentlewoman, former smoker, without history of intrinsic lung disease. She has a history of HTN, HLD, DM, CAD s/p PCI (1990s and 2021) and PAD s/p RLE stenting. The patient has been having difficulty ambulating over the last several months due to a sense of imbalance with impaired gait. Her family has noted a change in her personality with pressured speech and insomnia. Outpatient cervical MRI incidentally note a CNS lesion. Lumbar spine MRI performed due to chronic back pain revealed multilevel foraminal stenosis/disc bulges. 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 The patient has no shortness of breath or hypoxemia on room air. She has no cough, sputum production, chest congestion or wheeze. No fevers, chills or sweats. No chest pain/pressure or palpitations.     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 presenting with several months of ataxia - frontal lobe lesions have lead to decreased inhibition as well as to pressured and loquacious speech    8/2 - chest pain last night -> EKG c/w STEMI - cardiac enzymes are elevated -> catheterization not performed due to recent neurosurgery -> started on ASA and heparin gtt -> now quite lethargic although awakes to state her name and the name of her children    8/4 - much more awake and alert -> A & O x 3 -> very slow speech; remains in the CCU -> EKG c/w STEMI - cardiac enzymes are elevated -> catheterization not performed due to recent neurosurgery -> continues on ASA and heparin gtt without a change in brain CT -> troponin level continues to increase; no shortness of breath or hypoxemia on a 2lpm nasal canula    8/5 - much more awake and alert -> A & O x 3 -> slow speech and talking as if intoxicated; transferred to the surgical ICU; continues on ASA and heparin gtt without a change in brain CT following a NSTEMI; no shortness of breath or hypoxemia on room air; no seizures on EEG; receiving NGT feeds due to dysphagia    PLAN/RECOMMENDATIONS:    stable oxygenation on room air  markedly improved mental status on zosyn for E coli and Klebsiella UTI and possible post-obstructive pneumonia - MRSA nasal swab is negative -> vancomycin discontinued  CTA -> pulmonary embolism in the lateral segmental branch of the right middle lobe pulmonary artery without evidence of right heart strain - multiple segmental branches in the left lung are obscured by motion - 1.2 cm left hilar lymph node - patent central airways - left upper lobe mass decreased in size - obstruction of the left apical posterior bronchus with associated segmental atelectasis along the mediastinum  spirometry    FEV1 - 1.81 liters - 88% predicted    FVC - 2.48 liters - 90% predicted    FEV1% - 73       c/w normal spirometry  discontinue mucomyst and hypertonic saline nebs - the patient now has a vigorous cough  patient is already on heparin gtt for ACS which will treat the pulmonary embolism -> would switch to a DOAC in the next few days  s/p bronchoscopy -> 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 - foundation studies have been ordered  cardiology evaluation noted     STEMI - not a candidate for percutaneous coronary intervention given recent craniotomy limiting anti-platelet agents    medical management including ASA/heparin gtt/lipitor/losartan/metoprolol  neurosurgery evaluation noted    s/p right craniotomy for tumor resection    repeat CT scan 8/2  -> right temporal craniotomy - minimally decreased right frontal pneumocephalus - similar subdural hemorrhage along the right lateral convexity measuring up to 7 to 8 mm in greatest depth anteriorly - extensive edema surrounding the right temporal surgical bed - leftward midline shift of 6 to 7 mm - partial effacement of the suprasellar and right perimesencephalic cisterns - mild asymmetric dilatation of the left lateral ventricle - low density lesion with mild surrounding edema in the left parasagittal frontal lobe.    prophylactic anticonvulsants -> brivaracetam - no seizures on EEG    decadron 1mg q12h IV for cerebral edema -> glucose control on steroids    incentive spirometry as able    NGT feeds due to dysphagia -> repeat swallowing study on 8/8 now that the patient's mental status has improved    GI/DVT prophylaxis - protonix/heparin gtt  radiation oncology, neurooncology, medical oncology evaluations   bowel regimen    Will follow with you. Plan of care discussed with the patient and her family at bedside.    Satish Hopkins MD, [facility]  [phone]  Pulmonary Medicine

## 2022-08-06 NOTE — SPEECH LANGUAGE PATHOLOGY EVALUATION - COMMENTS
s/p R crani for metastatic lesion 7/27 consistent w metastatic non small cell lung cancer, residual lesion on L side of brain. Course complicated on the hospital floors by inferolateral STEMI 8/1, pt transferred to CICU and started on hep gtt no bolus and ASA, no cath given high risk from comorbidities and recent surgery.    Pulm 8/5> markedly improved mental status on zosyn for E coli and Klebsiella UTI and possible post-obstructive pneumonia - MRSA nasal swab is negative -> vancomycin discontinued. CTA -> pulmonary embolism in the lateral segmental branch of the right middle lobe pulmonary artery without evidence of right heart strain - multiple segmental branches in the left lung are obscured by motion. On AC.    8/5 seen for bedside swallow evaluation. At time of evaluation, pt NPO with NGT. Per daughter at bedside, patient had been tolerating regular diet, but then declined in function following NSTEMI and UTI, with overlaying worsening cognitive status and speech. Patient with overlying cognitive-communication deficits noted, with dysarthria, suggest formal speech and language evaluation is warranted at this time. Pt p/w oropharyngeal dysphagia with overt s/s penetration/aspiration following ice chip and honey thick liquid trials as seen by change in vocal quality. No overt s/s penetration/aspiration noted for thin puree consistency.    CXR 8/4 FINDINGS: Clear lungs. No pleural effusions or pneumothorax. Cardiac silhouette is unchanged. No acute osseous pathology. Partially imaged right shoulder   joint prosthesis. Enteric tube terminates in the stomach.    CTH 8/1 IMPRESSION:  Evolving postop changes in the right temporal lobe with   significant lucency appreciated in this region. Still with mass effect on   the right lateral ventricle and midline shift to the left roughly 6 mm. Please see postop MR 7/29/2022 for discussion of the operative bed. Still with lucency in the left parafalcine region as described on the prior MR   7/29/2022. Mild ataxic dysarthria Pulm 8/5>  markedly improved mental status on zosyn for E coli and Klebsiella UTI and possible post-obstructive pneumonia. Pt with Pulmonary embolism in lateral segmental branch of the right middle lobe pulmonary artery    8/5 seen for bedside swallow evaluation. At time of evaluation, pt NPO with NGT. Per daughter at bedside, patient had been tolerating regular diet, but then declined in function following NSTEMI and UTI, with overlaying worsening cognitive status and speech. Patient with overlying cognitive-communication deficits noted, with dysarthria, suggest formal speech and language evaluation is warranted at this time. Pt p/w oropharyngeal dysphagia with overt s/s penetration/aspiration following ice chip and honey thick liquid trials as seen by change in vocal quality. No overt s/s penetration/aspiration noted for thin puree consistency.    CXR 8/4 FINDINGS: Clear lungs. No pleural effusions or pneumothorax. Cardiac silhouette is unchanged. No acute osseous pathology. Partially imaged right shoulder joint prosthesis. Enteric tube terminates in the stomach.    CTH 8/1 IMPRESSION:  Evolving postop changes in the right temporal lobe with significant lucency appreciated in this region. Still with mass effect on the right lateral ventricle and midline shift to the left roughly 6 mm. Please see postop MR 7/29/2022 for discussion of the operative bed. Still with lucency in the left parafalcine region as described on the prior MR 7/29/2022. Cognition: 1. Pt will improve cognitive-linguistic skills for functional communication.

## 2022-08-06 NOTE — SPEECH LANGUAGE PATHOLOGY EVALUATION - SLP EXECUTIVE FUNCTIONS
impaired Executive function impairment: With clock drawing task; pt tamiko numbers in counterclockwise order; placed the hour hand at the appropriate number and did not know where to put the minute hand./impaired

## 2022-08-06 NOTE — PROGRESS NOTE ADULT - SUBJECTIVE AND OBJECTIVE BOX
CARDIOLOGY FOLLOW UP - Dr. Alcaraz  Date of Service: 8/6/2022  CC: no events    Review of Systems:  Constitutional: No fever, weight loss, or fatigue  Respiratory: No cough, wheezing, or hemoptysis, no shortness of breath  Cardiovascular: No chest pain, palpitations, passing out, dizziness, or leg swelling  Gastrointestinal: No abd or epigastric pain. No nausea, vomiting, or hematemesis; no diarrhea or consiptaiton, no melena or hematochezia  Vascular: No edema     TELEMETRY:    PHYSICAL EXAM:  T(C): 36.8 (08-06-22 @ 05:01), Max: 37.2 (08-05-22 @ 21:54)  HR: 75 (08-06-22 @ 05:01) (63 - 81)  BP: 102/67 (08-06-22 @ 05:01) (87/44 - 132/58)  RR: 18 (08-06-22 @ 05:01) (18 - 29)  SpO2: 97% (08-06-22 @ 05:01) (94% - 100%)  Wt(kg): --  I&O's Summary    05 Aug 2022 07:01  -  06 Aug 2022 07:00  --------------------------------------------------------  IN: 717.5 mL / OUT: 902 mL / NET: -184.5 mL        Appearance: Normal	  Cardiovascular: Normal S1 S2,RRR, No JVD, No murmurs  Respiratory: Lungs clear to auscultation	  Gastrointestinal:  Soft, Non-tender, + BS	  Extremities: Normal range of motion, No clubbing, cyanosis or edema  Vascular: Peripheral pulses palpable 2+ bilaterally       Home Medications:  acetaminophen 325 mg oral tablet: 2 tab(s) orally every 6 hours, As needed, Temp greater or equal to 38C (100.4F), Mild Pain (1 - 3) (01 Aug 2022 13:52)  amLODIPine 5 mg oral tablet: 1 tab(s) orally once a day (01 Aug 2022 13:52)  atorvastatin 40 mg oral tablet: 1 tab(s) orally once a day (at bedtime) (01 Aug 2022 13:52)  bisacodyl 5 mg oral delayed release tablet: 1 tab(s) orally once a day, As needed, Constipation (01 Aug 2022 13:52)  dexamethasone 2 mg oral tablet: 1 tab po every 8 hours for 2 days, then 1 tab po q12 hours x2 days, then 1 tab po daily x3 days, then stop (01 Aug 2022 13:52)  divalproex sodium 500 mg oral delayed release tablet: 1 tab(s) orally every 8 hours (01 Aug 2022 13:52)  enoxaparin: 40 milligram(s) subcutaneous once a day (at bedtime) (01 Aug 2022 13:52)  gabapentin 100 mg oral capsule: 2 cap(s) orally once a day (at bedtime) (01 Aug 2022 13:52)  insulin glargine 100 units/mL subcutaneous solution: 15 unit(s) subcutaneous once a day (at bedtime) (01 Aug 2022 13:52)  insulin lispro 100 units/mL injectable solution: 5 unit(s) subcutaneous once a day before lunch (01 Aug 2022 13:52)  insulin lispro 100 units/mL injectable solution: 7 unit(s) subcutaneous once a day before breakfast (01 Aug 2022 13:52)  insulin lispro 100 units/mL injectable solution: 7 unit(s) subcutaneous once a day before dinner (01 Aug 2022 13:52)  insulin lispro 100 units/mL injectable solution: moderate dose sliding scale as per hospital/facility protocol, 3 times a day (before meals) (01 Aug 2022 13:52)  losartan 100 mg oral tablet: 1 tab(s) orally once a day (01 Aug 2022 13:52)  melatonin 5 mg oral tablet: 1 tab(s) orally once a day (at bedtime), As needed, Sleep (01 Aug 2022 13:52)  metoprolol tartrate 25 mg oral tablet: 1 tab(s) orally 2 times a day (01 Aug 2022 13:52)  oxyCODONE 10 mg oral tablet: 1 tab(s) orally every 4 hours, As needed, Severe Pain (7 - 10) (01 Aug 2022 13:52)  oxyCODONE 5 mg oral tablet: 1 tab(s) orally every 4 hours, As needed, Moderate Pain (4 - 6) (01 Aug 2022 13:52)  pantoprazole 40 mg oral delayed release tablet: 1 tab(s) orally once a day (before a meal) (01 Aug 2022 13:52)  polyethylene glycol 3350 oral powder for reconstitution: 17 gram(s) orally 2 times a day (01 Aug 2022 13:52)  senna leaf extract oral tablet: 2 tab(s) orally once a day (at bedtime) (01 Aug 2022 13:52)        MEDICATIONS  (STANDING):  acetylcysteine 10%  Inhalation 4 milliLiter(s) Inhalation every 6 hours  aspirin  chewable 81 milliGRAM(s) Oral daily  atorvastatin 80 milliGRAM(s) Oral at bedtime  BACItracin   Ointment 1 Application(s) Topical two times a day  brivaracetam  IVPB 100 milliGRAM(s) IV Intermittent two times a day  dexAMETHasone  Injectable 1 milliGRAM(s) IV Push every 12 hours  gabapentin 200 milliGRAM(s) Oral at bedtime  heparin  Infusion 750 Unit(s)/Hr (7.5 mL/Hr) IV Continuous <Continuous>  insulin lispro (ADMELOG) corrective regimen sliding scale   SubCutaneous every 6 hours  insulin NPH human recombinant 16 Unit(s) SubCutaneous every 6 hours  losartan 100 milliGRAM(s) Oral daily  metoprolol tartrate 12.5 milliGRAM(s) Oral two times a day  pantoprazole  Injectable 40 milliGRAM(s) IV Push every 24 hours  piperacillin/tazobactam IVPB.. 3.375 Gram(s) IV Intermittent every 8 hours  psyllium Powder 1 Packet(s) Enteral Tube every 12 hours  senna 2 Tablet(s) Oral at bedtime  sodium chloride 3%  Inhalation 4 milliLiter(s) Inhalation every 6 hours        EKG:  RADIOLOGY:  DIAGNOSTIC TESTING:  [ ] Echocardiogram:  [ ] Catherterization:  [ ] Stress Test:  OTHER:     LABS:	 	                          10.6   12.70 )-----------( 228      ( 06 Aug 2022 06:36 )             33.4     08-06    141  |  103  |  46<H>  ----------------------------<  215<H>  4.5   |  27  |  1.01    Ca    8.9      06 Aug 2022 06:36  Phos  4.0     08-04  Mg     2.4     08-04    TPro  6.7  /  Alb  3.0<L>  /  TBili  0.5  /  DBili  x   /  AST  37  /  ALT  22  /  AlkPhos  79  08-04      PT/INR - ( 04 Aug 2022 21:53 )   PT: 11.4 sec;   INR: 0.99 ratio         PTT - ( 06 Aug 2022 06:33 )  PTT:90.4 sec    CARDIAC MARKERS:

## 2022-08-06 NOTE — PROGRESS NOTE ADULT - ASSESSMENT
Assessment  DMT2: 82y Female with DM T2 with hyperglycemia, A1C 8.4%, was on oral meds and insulin at home, now on basal bolus, blood sugars running high and not at target in the setting of steroid management, no hypoglycemic episodes, NAD, on dexamethasone.  Brain Mass: on meds, monitored, FU Neurosurgery.  Lung Mass: for bronchoscopy, monitored.  Thyroid Nodules: seen on imaging, B/L subcentimeter thyroid nodules, she denies neck mass/pain/dysphagia, euthyroid, reports known history and is being followed by Dr. Richardson.  Adrenal Nodule: seen on imaging, 1.2 cm left thyroid nodule, unknown history.        Troy Thibodeaux MD  [phone]

## 2022-08-06 NOTE — CHART NOTE - NSCHARTNOTEFT_GEN_A_CORE
Pt is an 83 y/o female (former smoker) with PMHx of HTN, CAD, HLD, MI, peripheral neuropathy, DM and peripheral artery disease presented to the ED for head imaging due to abnormal spine MRI, found to have metastatic lung cancer with mets to brain.7/27. s/p R crani for metastatic lesion 7/27 consistent w metastatic non small cell lung cancer, residual lesion on L side of brain; heme/onc following. Course complicated on the hospital floors by inferolateral STEMI 8/1, pt transferred to CICU 8/6 transfer to neuro floor. Per pulm, + pulmonary embolism s/p bronch on 7/20 with poorly differentiated adenocarcinoma. ID following for leukocytosis unclear origin, possible post-obstructive PNA, blood cultures (-), on abx.      8/5 Seen for bedside swallow evaluation with Rx for NPO with alternate means of nutrition/hydration/meds given oropharyngeal dysphagia with overt s/s of aspiration following ice chips and moderately thick liquids. Per daughter, patient had been tolerating regular diet, but then declined in function following NSTEMI and UTI. Noted cognitive deficits and dysarthria warranting Speech-language evaluation  8/6 Speech-language exam completed. Pt p/w intact language skills, moderate cognitive-linguistic impairments, and mild ataxic dysarthria.     TODAY, seen for re-evaluation of swallow to assess candidacy for P.O. and objective testing. Pt received at bedside +josé antonio Del Rosario +NGT AOx3, mildly tangential/easily distracted, requiring min verbal re-direction. Overall cooperative and able to follow all directives and express all wants/needs in conversation. Pt strongly expressed desire to eat by mouth. +upper dentures +mild ataxic dysarthria. + large asymmetrical protrusion on hard palate; pt endorsed presence of torus palatinus for 8 years+ without change, discomfort, or impact to speech or swallow. Pt provided with puree, moderately thick liquids, mildly thick liquids, easy to chew solids. Swallow characterized by mildly prolonged oral transport with suspected premature spillage, multiple swallows (x2-4 per bolus) indicative of pharyngeal residue vs laryngeal penetration, with immediate cough following easy to chew solids, and latent coughing after intake of puree and mildly thick liquids. Noted with reduced safety awareness stating "I don't have any trouble swallowing" during coughing episode. Although no overt s/sx of aspiration with moderately thick liquids, given hospital course, severity of dysphagia upon onset, and compromised pulmonary status, recommendations for conservative management at this time due to high risk of aspiration. Oral diet not recommended at this time given high risk of aspiration with current swallow profile; objective testing warranted.    IMPRESSIONS; Pt p/w clinical s/s of oropharyngeal dysphagia with overt s/s of aspiration on conservative textures. Pt not a candidate for oral diet at this time. Objective testing indicated to r/o aspiration and determine least restrictive diet.     RECOMMENDATIONS  > NPO, with non-oral nutrition/hydration/medications.   > MBS scheduled for Monday 8/8  >Maintain aspiration precautions  >Maintain oral hygiene  >D/C acute rehab    D/W MONIQUE Mott; ANDREWS Duckworth  Speech Pathology: Erik Raphael CCC-SLP *Teams preferred* ([id number] or [phone])

## 2022-08-06 NOTE — PROGRESS NOTE ADULT - PROBLEM SELECTOR PLAN 1
Will increase NPH to 18u q6hr, continue current insulin coverage for now. Will continue monitoring  blood sugars, will Follow up.

## 2022-08-06 NOTE — PROGRESS NOTE ADULT - SUBJECTIVE AND OBJECTIVE BOX
DATE OF SERVICE: 08-06-22 @ 10:45    Patient is a 82y old  Female who presents with a chief complaint of abnormal imaging (06 Aug 2022 08:39)      SUBJECTIVE / OVERNIGHT EVENTS:  No chest pain. No shortness of breath. No complaints. No events overnight.     MEDICATIONS  (STANDING):  acetylcysteine 10%  Inhalation 4 milliLiter(s) Inhalation every 6 hours  aspirin  chewable 81 milliGRAM(s) Oral daily  atorvastatin 80 milliGRAM(s) Oral at bedtime  BACItracin   Ointment 1 Application(s) Topical two times a day  brivaracetam  IVPB 100 milliGRAM(s) IV Intermittent two times a day  dexAMETHasone  Injectable 1 milliGRAM(s) IV Push every 12 hours  gabapentin 200 milliGRAM(s) Oral at bedtime  heparin  Infusion 750 Unit(s)/Hr (7.5 mL/Hr) IV Continuous <Continuous>  insulin lispro (ADMELOG) corrective regimen sliding scale   SubCutaneous every 6 hours  insulin NPH human recombinant 16 Unit(s) SubCutaneous every 6 hours  losartan 100 milliGRAM(s) Oral daily  metoprolol tartrate 12.5 milliGRAM(s) Oral two times a day  pantoprazole  Injectable 40 milliGRAM(s) IV Push every 24 hours  piperacillin/tazobactam IVPB.. 3.375 Gram(s) IV Intermittent every 8 hours  psyllium Powder 1 Packet(s) Enteral Tube every 12 hours  senna 2 Tablet(s) Oral at bedtime  sodium chloride 3%  Inhalation 4 milliLiter(s) Inhalation every 6 hours    MEDICATIONS  (PRN):  melatonin 5 milliGRAM(s) Oral at bedtime PRN Sleep      Vital Signs Last 24 Hrs  T(C): 36.8 (06 Aug 2022 05:01), Max: 37.2 (05 Aug 2022 21:54)  T(F): 98.2 (06 Aug 2022 05:01), Max: 98.9 (05 Aug 2022 21:54)  HR: 75 (06 Aug 2022 05:01) (68 - 81)  BP: 102/67 (06 Aug 2022 05:01) (87/44 - 132/58)  BP(mean): 83 (05 Aug 2022 17:00) (60 - 84)  RR: 18 (06 Aug 2022 05:01) (18 - 29)  SpO2: 97% (06 Aug 2022 05:01) (94% - 100%)    Parameters below as of 06 Aug 2022 08:00  Patient On (Oxygen Delivery Method): room air      CAPILLARY BLOOD GLUCOSE      POCT Blood Glucose.: 217 mg/dL (06 Aug 2022 05:11)  POCT Blood Glucose.: 257 mg/dL (06 Aug 2022 00:24)  POCT Blood Glucose.: 279 mg/dL (05 Aug 2022 21:57)  POCT Blood Glucose.: 350 mg/dL (05 Aug 2022 16:00)    I&O's Summary    05 Aug 2022 07:01  -  06 Aug 2022 07:00  --------------------------------------------------------  IN: 717.5 mL / OUT: 902 mL / NET: -184.5 mL        PHYSICAL EXAM:  GENERAL: NAD, well-developed  HEAD:  Atraumatic, Normocephalic  EYES: EOMI, PERRLA, conjunctiva and sclera clear  NECK: Supple, No JVD  CHEST/LUNG: Clear to auscultation bilaterally; No wheeze  HEART: Regular rate and rhythm; No murmurs, rubs, or gallops  ABDOMEN: Soft, Nontender, Nondistended; Bowel sounds present  EXTREMITIES:  2+ Peripheral Pulses, No clubbing, cyanosis, or edema  PSYCH: AAOx3  NEUROLOGY: non-focal, slurred speech  SKIN: No rashes or lesions    LABS:                        10.6   12.70 )-----------( 228      ( 06 Aug 2022 06:36 )             33.4     08-06    141  |  103  |  46<H>  ----------------------------<  215<H>  4.5   |  27  |  1.01    Ca    8.9      06 Aug 2022 06:36  Phos  4.0     08-04  Mg     2.4     08-04    TPro  6.7  /  Alb  3.0<L>  /  TBili  0.5  /  DBili  x   /  AST  37  /  ALT  22  /  AlkPhos  79  08-04    PT/INR - ( 04 Aug 2022 21:53 )   PT: 11.4 sec;   INR: 0.99 ratio         PTT - ( 06 Aug 2022 06:33 )  PTT:90.4 sec          RADIOLOGY & ADDITIONAL TESTS:    Imaging Personally Reviewed:    Consultant(s) Notes Reviewed:      Care Discussed with Consultants/Other Providers:

## 2022-08-06 NOTE — PROGRESS NOTE ADULT - ASSESSMENT
Patient is an 82 year old female that presented to ED with abnormal MRI reading (discovered by patient's orthopedic), also with gait/balance issues since April, found to have R temporal brain mass c/f metastatic brain lesion now s/p craniotomy 7/27. While pending surgery, AC held, and on 8/1 presented with CP found to have STEMI on EKG. ASA loaded and started on hep gtt 8/1 and transferred to CCU for further management.    ====================  NEUROLOGY    #AMS  - Repeat CTH x5 unchanged from prior  - UA+, started on zosyn day 4    #Brain lesion  - s/p craniotomy for resection of brain mass 7/27, path showing NSC carcinoma (favor adeno)  - c/w depakote  - c/w dexamethasone  - repeat CTH 1 hour post AC resumption unremarkable  - goal -160     #Peripheral neuropathy  - c/w gabapentin    ====================  CARDIOVASCULAR    #CAD s/p PCI  #STEMI  - TTE 7/22 with normal LV internal dimensions with discrete upper septal hypertrophy. EF 55%, inferolateral wall is akinetic.  - c/w lopressor 25 bid  - c/w atorva 40  - EKG 8/1 with STEMI in multiple leads, r/p EKG more pronounced STEMI   - too high risk for cath given recent craniotomy  - s/p ASA load, starting heparin gtt      PE  -  on iv heparin    #HTN  - consider decreasing  losartan to 50 as pt is hypotensive    ====================  RESPIRATORY    #Metastatic lung cancer  - s/p bronchoscopy - 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  - no indication for pulmonary medications or antibiotics  - Pulm following, appreciate recs    - Pt tachypneic, borderline HoTN 8/2 c/f PE, CTA chest obtained to r/o PE  - iv heparin for PE  - satting well on RA  - incentive mario    ====================  GI    - NG tube feeds  - pantoprazole 40 qD  - senna, dulcolax    ====================  ENDOCRINE    #DM  - NPH 6   - moderate dose correctional scale  - Endo following, appreciate recs     ====================  RENAL    - Monitor UOP, lytes    ====================  HEMATOLOGY/ONCOLOGY    #Leukocytosis  - is on dexamethasone,     #Metastatic lung cancer  - Onc following, recs appreciated  - Path +NSCC    #DVT PPx  - c/w ASA, heparin gtt    ====================  INFECTIOUS DISEASES    - on abx for poss uti

## 2022-08-07 LAB
ALBUMIN SERPL ELPH-MCNC: 2.6 G/DL — LOW (ref 3.3–5)
ALP SERPL-CCNC: 63 U/L — SIGNIFICANT CHANGE UP (ref 40–120)
ALT FLD-CCNC: 26 U/L — SIGNIFICANT CHANGE UP (ref 10–45)
ANION GAP SERPL CALC-SCNC: 10 MMOL/L — SIGNIFICANT CHANGE UP (ref 5–17)
ANION GAP SERPL CALC-SCNC: 8 MMOL/L — SIGNIFICANT CHANGE UP (ref 5–17)
APTT BLD: 75.6 SEC — HIGH (ref 27.5–35.5)
AST SERPL-CCNC: 22 U/L — SIGNIFICANT CHANGE UP (ref 10–40)
BILIRUB SERPL-MCNC: 0.2 MG/DL — SIGNIFICANT CHANGE UP (ref 0.2–1.2)
BUN SERPL-MCNC: 46 MG/DL — HIGH (ref 7–23)
BUN SERPL-MCNC: 49 MG/DL — HIGH (ref 7–23)
CALCIUM SERPL-MCNC: 8.9 MG/DL — SIGNIFICANT CHANGE UP (ref 8.4–10.5)
CALCIUM SERPL-MCNC: 9.1 MG/DL — SIGNIFICANT CHANGE UP (ref 8.4–10.5)
CHLORIDE SERPL-SCNC: 103 MMOL/L — SIGNIFICANT CHANGE UP (ref 96–108)
CHLORIDE SERPL-SCNC: 104 MMOL/L — SIGNIFICANT CHANGE UP (ref 96–108)
CO2 SERPL-SCNC: 25 MMOL/L — SIGNIFICANT CHANGE UP (ref 22–31)
CO2 SERPL-SCNC: 26 MMOL/L — SIGNIFICANT CHANGE UP (ref 22–31)
CREAT SERPL-MCNC: 1.04 MG/DL — SIGNIFICANT CHANGE UP (ref 0.5–1.3)
CREAT SERPL-MCNC: 1.05 MG/DL — SIGNIFICANT CHANGE UP (ref 0.5–1.3)
EGFR: 53 ML/MIN/1.73M2 — LOW
EGFR: 54 ML/MIN/1.73M2 — LOW
GLUCOSE BLDC GLUCOMTR-MCNC: 150 MG/DL — HIGH (ref 70–99)
GLUCOSE BLDC GLUCOMTR-MCNC: 211 MG/DL — HIGH (ref 70–99)
GLUCOSE BLDC GLUCOMTR-MCNC: 216 MG/DL — HIGH (ref 70–99)
GLUCOSE BLDC GLUCOMTR-MCNC: 217 MG/DL — HIGH (ref 70–99)
GLUCOSE SERPL-MCNC: 167 MG/DL — HIGH (ref 70–99)
GLUCOSE SERPL-MCNC: 220 MG/DL — HIGH (ref 70–99)
HCT VFR BLD CALC: 31.8 % — LOW (ref 34.5–45)
HGB BLD-MCNC: 10.2 G/DL — LOW (ref 11.5–15.5)
MCHC RBC-ENTMCNC: 30.9 PG — SIGNIFICANT CHANGE UP (ref 27–34)
MCHC RBC-ENTMCNC: 32.1 GM/DL — SIGNIFICANT CHANGE UP (ref 32–36)
MCV RBC AUTO: 96.4 FL — SIGNIFICANT CHANGE UP (ref 80–100)
NRBC # BLD: 0 /100 WBCS — SIGNIFICANT CHANGE UP (ref 0–0)
PLATELET # BLD AUTO: 223 K/UL — SIGNIFICANT CHANGE UP (ref 150–400)
POTASSIUM SERPL-MCNC: 5.4 MMOL/L — HIGH (ref 3.5–5.3)
POTASSIUM SERPL-MCNC: 5.7 MMOL/L — HIGH (ref 3.5–5.3)
POTASSIUM SERPL-SCNC: 5.4 MMOL/L — HIGH (ref 3.5–5.3)
POTASSIUM SERPL-SCNC: 5.7 MMOL/L — HIGH (ref 3.5–5.3)
PROT SERPL-MCNC: 5.4 G/DL — LOW (ref 6–8.3)
RBC # BLD: 3.3 M/UL — LOW (ref 3.8–5.2)
RBC # FLD: 12.6 % — SIGNIFICANT CHANGE UP (ref 10.3–14.5)
SODIUM SERPL-SCNC: 137 MMOL/L — SIGNIFICANT CHANGE UP (ref 135–145)
SODIUM SERPL-SCNC: 139 MMOL/L — SIGNIFICANT CHANGE UP (ref 135–145)
TROPONIN T, HIGH SENSITIVITY RESULT: 3489 NG/L — HIGH (ref 0–51)
WBC # BLD: 11.75 K/UL — HIGH (ref 3.8–10.5)
WBC # FLD AUTO: 11.75 K/UL — HIGH (ref 3.8–10.5)

## 2022-08-07 PROCEDURE — 93970 EXTREMITY STUDY: CPT | Mod: 26

## 2022-08-07 PROCEDURE — 93010 ELECTROCARDIOGRAM REPORT: CPT

## 2022-08-07 RX ORDER — INSULIN GLARGINE 100 [IU]/ML
6 INJECTION, SOLUTION SUBCUTANEOUS AT BEDTIME
Refills: 0 | Status: DISCONTINUED | OUTPATIENT
Start: 2022-08-07 | End: 2022-08-07

## 2022-08-07 RX ORDER — HUMAN INSULIN 100 [IU]/ML
16 INJECTION, SUSPENSION SUBCUTANEOUS EVERY 8 HOURS
Refills: 0 | Status: DISCONTINUED | OUTPATIENT
Start: 2022-08-07 | End: 2022-08-08

## 2022-08-07 RX ORDER — GLUCAGON INJECTION, SOLUTION 0.5 MG/.1ML
1 INJECTION, SOLUTION SUBCUTANEOUS ONCE
Refills: 0 | Status: DISCONTINUED | OUTPATIENT
Start: 2022-08-07 | End: 2022-08-10

## 2022-08-07 RX ORDER — DEXTROSE 50 % IN WATER 50 %
15 SYRINGE (ML) INTRAVENOUS ONCE
Refills: 0 | Status: DISCONTINUED | OUTPATIENT
Start: 2022-08-07 | End: 2022-08-10

## 2022-08-07 RX ORDER — DEXTROSE 50 % IN WATER 50 %
25 SYRINGE (ML) INTRAVENOUS ONCE
Refills: 0 | Status: DISCONTINUED | OUTPATIENT
Start: 2022-08-07 | End: 2022-08-10

## 2022-08-07 RX ORDER — METOPROLOL TARTRATE 50 MG
25 TABLET ORAL
Refills: 0 | Status: DISCONTINUED | OUTPATIENT
Start: 2022-08-08 | End: 2022-08-10

## 2022-08-07 RX ORDER — SODIUM ZIRCONIUM CYCLOSILICATE 10 G/10G
5 POWDER, FOR SUSPENSION ORAL ONCE
Refills: 0 | Status: COMPLETED | OUTPATIENT
Start: 2022-08-07 | End: 2022-08-07

## 2022-08-07 RX ORDER — DEXTROSE 50 % IN WATER 50 %
12.5 SYRINGE (ML) INTRAVENOUS ONCE
Refills: 0 | Status: DISCONTINUED | OUTPATIENT
Start: 2022-08-07 | End: 2022-08-10

## 2022-08-07 RX ADMIN — HUMAN INSULIN 18 UNIT(S): 100 INJECTION, SUSPENSION SUBCUTANEOUS at 11:45

## 2022-08-07 RX ADMIN — SENNA PLUS 2 TABLET(S): 8.6 TABLET ORAL at 21:29

## 2022-08-07 RX ADMIN — SODIUM ZIRCONIUM CYCLOSILICATE 5 GRAM(S): 10 POWDER, FOR SUSPENSION ORAL at 23:59

## 2022-08-07 RX ADMIN — LOSARTAN POTASSIUM 100 MILLIGRAM(S): 100 TABLET, FILM COATED ORAL at 05:27

## 2022-08-07 RX ADMIN — PANTOPRAZOLE SODIUM 40 MILLIGRAM(S): 20 TABLET, DELAYED RELEASE ORAL at 17:32

## 2022-08-07 RX ADMIN — ATORVASTATIN CALCIUM 80 MILLIGRAM(S): 80 TABLET, FILM COATED ORAL at 21:28

## 2022-08-07 RX ADMIN — HUMAN INSULIN 16 UNIT(S): 100 INJECTION, SUSPENSION SUBCUTANEOUS at 21:30

## 2022-08-07 RX ADMIN — Medication 4: at 05:21

## 2022-08-07 RX ADMIN — Medication 1 APPLICATION(S): at 18:46

## 2022-08-07 RX ADMIN — Medication 1 PACKET(S): at 18:46

## 2022-08-07 RX ADMIN — BRIVARACETAM 240 MILLIGRAM(S): 25 TABLET, FILM COATED ORAL at 05:28

## 2022-08-07 RX ADMIN — Medication 81 MILLIGRAM(S): at 11:46

## 2022-08-07 RX ADMIN — HUMAN INSULIN 18 UNIT(S): 100 INJECTION, SUSPENSION SUBCUTANEOUS at 00:41

## 2022-08-07 RX ADMIN — Medication 1 MILLIGRAM(S): at 17:33

## 2022-08-07 RX ADMIN — Medication 1 MILLIGRAM(S): at 05:27

## 2022-08-07 RX ADMIN — Medication 4: at 11:46

## 2022-08-07 RX ADMIN — PIPERACILLIN AND TAZOBACTAM 25 GRAM(S): 4; .5 INJECTION, POWDER, LYOPHILIZED, FOR SOLUTION INTRAVENOUS at 00:40

## 2022-08-07 RX ADMIN — GABAPENTIN 200 MILLIGRAM(S): 400 CAPSULE ORAL at 21:29

## 2022-08-07 RX ADMIN — Medication 1 PACKET(S): at 05:26

## 2022-08-07 RX ADMIN — PIPERACILLIN AND TAZOBACTAM 25 GRAM(S): 4; .5 INJECTION, POWDER, LYOPHILIZED, FOR SOLUTION INTRAVENOUS at 10:05

## 2022-08-07 RX ADMIN — Medication 4: at 21:30

## 2022-08-07 RX ADMIN — BRIVARACETAM 240 MILLIGRAM(S): 25 TABLET, FILM COATED ORAL at 17:31

## 2022-08-07 RX ADMIN — Medication 1 APPLICATION(S): at 05:27

## 2022-08-07 RX ADMIN — HUMAN INSULIN 18 UNIT(S): 100 INJECTION, SUSPENSION SUBCUTANEOUS at 05:27

## 2022-08-07 NOTE — PROGRESS NOTE ADULT - SUBJECTIVE AND OBJECTIVE BOX
CC: f/u for possible septic event    Patient reports: she is frustrated by her inability to void.No cough or dyspnea, no headache    REVIEW OF SYSTEMS:  All other review of systems negative (Comprehensive ROS)    Antimicrobials Day #  :day 5/5  piperacillin/tazobactam IVPB.. 3.375 Gram(s) IV Intermittent every 8 hours    Other Medications Reviewed  MEDICATIONS  (STANDING):  aspirin  chewable 81 milliGRAM(s) Oral daily  atorvastatin 80 milliGRAM(s) Oral at bedtime  BACItracin   Ointment 1 Application(s) Topical two times a day  brivaracetam  IVPB 100 milliGRAM(s) IV Intermittent two times a day  dexAMETHasone  Injectable 1 milliGRAM(s) IV Push every 12 hours  gabapentin 200 milliGRAM(s) Oral at bedtime  heparin  Infusion 750 Unit(s)/Hr (7.5 mL/Hr) IV Continuous <Continuous>  insulin lispro (ADMELOG) corrective regimen sliding scale   SubCutaneous every 6 hours  insulin NPH human recombinant 18 Unit(s) SubCutaneous every 6 hours  losartan 100 milliGRAM(s) Oral daily  metoprolol tartrate 12.5 milliGRAM(s) Oral two times a day  pantoprazole  Injectable 40 milliGRAM(s) IV Push every 24 hours  piperacillin/tazobactam IVPB.. 3.375 Gram(s) IV Intermittent every 8 hours  psyllium Powder 1 Packet(s) Enteral Tube every 12 hours  senna 2 Tablet(s) Oral at bedtime    T(F): 98.4 (08-07-22 @ 04:53), Max: 98.4 (08-07-22 @ 04:53)  HR: 67 (08-07-22 @ 04:53)  BP: 104/64 (08-07-22 @ 04:53)  RR: 18 (08-07-22 @ 04:53)  SpO2: 99% (08-07-22 @ 04:53)  Wt(kg): --    PHYSICAL EXAM:  General: alert, no acute distress, craniotomy incision C/D/I  Eyes:  anicteric, no conjunctival injection, no discharge  Oropharynx: no lesions or injection 	  Neck: supple, without adenopathy  Lungs: clear to auscultation  Heart: regular rate and rhythm; no murmur, rubs or gallops  Abdomen: soft, nondistended, nontender, without mass or organomegaly  Skin: no lesions  Extremities: no clubbing, cyanosis, or edema  Neurologic: alert, oriented, moves all extremities  : ISC continues  LAB RESULTS:                        10.6   13.06 )-----------( 248      ( 06 Aug 2022 23:04 )             32.9     08-06    141  |  103  |  46<H>  ----------------------------<  215<H>  4.5   |  27  |  1.01    Ca    8.9      06 Aug 2022 06:36          MICROBIOLOGY:  RECENT CULTURES:  08-03 @ 13:05 Catheterized Catheterized Escherichia coli  Klebsiella pneumoniae    >100,000 CFU/ml Escherichia coli  >100,000 CFU/ml Klebsiella pneumoniae      08-03 @ 10:59 .Blood Blood     No growth to date.      08-03 @ 10:58 .Blood Blood     No growth to date.          RADIOLOGY REVIEWED:  < from: US Abdomen Complete (US Abdomen Complete .) (08.04.22 @ 15:00) >  IMPRESSION:    Mildly distended gallbladder. No evidence of acute cholecystitis.    Possible mild hydronephrosis.    < end of copied text >

## 2022-08-07 NOTE — PROGRESS NOTE ADULT - SUBJECTIVE AND OBJECTIVE BOX
DATE OF SERVICE: 08-07-22 @ 11:50    Patient is a 82y old  Female who presents with a chief complaint of abnormal imaging (07 Aug 2022 07:37)      SUBJECTIVE / OVERNIGHT EVENTS:  No chest pain. No shortness of breath. No complaints. No events overnight.     MEDICATIONS  (STANDING):  aspirin  chewable 81 milliGRAM(s) Oral daily  atorvastatin 80 milliGRAM(s) Oral at bedtime  BACItracin   Ointment 1 Application(s) Topical two times a day  brivaracetam  IVPB 100 milliGRAM(s) IV Intermittent two times a day  dexAMETHasone  Injectable 1 milliGRAM(s) IV Push every 12 hours  gabapentin 200 milliGRAM(s) Oral at bedtime  heparin  Infusion 750 Unit(s)/Hr (7.5 mL/Hr) IV Continuous <Continuous>  insulin lispro (ADMELOG) corrective regimen sliding scale   SubCutaneous every 6 hours  insulin NPH human recombinant 18 Unit(s) SubCutaneous every 6 hours  losartan 100 milliGRAM(s) Oral daily  metoprolol tartrate 12.5 milliGRAM(s) Oral two times a day  pantoprazole  Injectable 40 milliGRAM(s) IV Push every 24 hours  piperacillin/tazobactam IVPB.. 3.375 Gram(s) IV Intermittent every 8 hours  psyllium Powder 1 Packet(s) Enteral Tube every 12 hours  senna 2 Tablet(s) Oral at bedtime    MEDICATIONS  (PRN):  melatonin 5 milliGRAM(s) Oral at bedtime PRN Sleep      Vital Signs Last 24 Hrs  T(C): 36.7 (07 Aug 2022 09:47), Max: 36.9 (07 Aug 2022 04:53)  T(F): 98 (07 Aug 2022 09:47), Max: 98.4 (07 Aug 2022 04:53)  HR: 67 (07 Aug 2022 09:47) (67 - 75)  BP: 98/63 (07 Aug 2022 09:47) (89/56 - 126/69)  BP(mean): --  RR: 18 (07 Aug 2022 09:47) (18 - 18)  SpO2: 99% (07 Aug 2022 09:47) (95% - 99%)    Parameters below as of 07 Aug 2022 09:47  Patient On (Oxygen Delivery Method): room air      CAPILLARY BLOOD GLUCOSE      POCT Blood Glucose.: 216 mg/dL (07 Aug 2022 11:39)  POCT Blood Glucose.: 211 mg/dL (07 Aug 2022 04:40)  POCT Blood Glucose.: 122 mg/dL (06 Aug 2022 21:51)  POCT Blood Glucose.: 225 mg/dL (06 Aug 2022 16:40)    I&O's Summary    06 Aug 2022 07:01  -  07 Aug 2022 07:00  --------------------------------------------------------  IN: 0 mL / OUT: 1100 mL / NET: -1100 mL    07 Aug 2022 07:01  -  07 Aug 2022 11:50  --------------------------------------------------------  IN: 270 mL / OUT: 0 mL / NET: 270 mL        PHYSICAL EXAM:  GENERAL: NAD, well-developed  HEAD:  Atraumatic, Normocephalic  EYES: EOMI, PERRLA, conjunctiva and sclera clear  NECK: Supple, No JVD  CHEST/LUNG: Clear to auscultation bilaterally; No wheeze  HEART: Regular rate and rhythm; No murmurs, rubs, or gallops  ABDOMEN: Soft, Nontender, Nondistended; Bowel sounds present  EXTREMITIES:  2+ Peripheral Pulses, No clubbing, cyanosis, or edema  PSYCH: AAOx3  NEUROLOGY: non-focal  SKIN: No rashes or lesions    LABS:                        10.2   11.75 )-----------( 223      ( 07 Aug 2022 06:53 )             31.8     08-06    141  |  103  |  46<H>  ----------------------------<  215<H>  4.5   |  27  |  1.01    Ca    8.9      06 Aug 2022 06:36      PTT - ( 07 Aug 2022 06:53 )  PTT:75.6 sec          RADIOLOGY & ADDITIONAL TESTS:    Imaging Personally Reviewed:    Consultant(s) Notes Reviewed:      Care Discussed with Consultants/Other Providers:

## 2022-08-07 NOTE — PROVIDER CONTACT NOTE (OTHER) - REASON
pt c/o of chest pain
HR 45 while sleeping-Due for metoprolol
Bradycardia/ new EKG
HR 47 while patient asleep
Blood pressure 79/54
Patient's FS 79 before lunch.
ptt 58.3
patient has  last for 1.6 sec at 1920
pt finger stick 275 pt also with large loose BM X3 Since shift 1900

## 2022-08-07 NOTE — PROGRESS NOTE ADULT - ASSESSMENT
Assessment  DMT2: 82y Female with DM T2 with hyperglycemia, A1C 8.4%, was on oral meds and insulin at home, on NPH and coverage insulin , blood sugars trending up, no hypoglycemic episodes, NAD, on dexamethasone.  Brain Mass: on meds, monitored, FU Neurosurgery.  Lung Mass: for bronchoscopy, monitored.  Thyroid Nodules: seen on imaging, B/L subcentimeter thyroid nodules, she denies neck mass/pain/dysphagia, euthyroid, reports known history and is being followed by Dr. Richardson.  Adrenal Nodule: seen on imaging, 1.2 cm left thyroid nodule, unknown history.        Troy Thibodeaux MD  [phone]

## 2022-08-07 NOTE — PROGRESS NOTE ADULT - SUBJECTIVE AND OBJECTIVE BOX
SUBJECTIVE: Pt seen and examined, resting comfortably in bed    OVERNIGHT EVENTS: none    Vital Signs Last 24 Hrs  T(C): 36.7 (07 Aug 2022 09:47), Max: 36.9 (07 Aug 2022 04:53)  T(F): 98 (07 Aug 2022 09:47), Max: 98.4 (07 Aug 2022 04:53)  HR: 67 (07 Aug 2022 09:47) (67 - 73)  BP: 98/63 (07 Aug 2022 09:47) (98/63 - 126/69)  BP(mean): --  RR: 18 (07 Aug 2022 09:47) (18 - 18)  SpO2: 99% (07 Aug 2022 09:47) (95% - 99%)    Parameters below as of 07 Aug 2022 09:47  Patient On (Oxygen Delivery Method): room air        PHYSICAL EXAM:    General: No Acute Distress     Neurological: Awake, alert oriented to person, place and time, Following Commands, Face Symmetrical, Speech Fluent, Moving all extremities spont/ag/purposeful, No Drift, Sensation to Light Touch Intact    Pulmonary: Clear to Auscultation, No Rales, No Rhonchi, No Wheezes     Cardiovascular: S1, S2, Regular Rate and Rhythm     Gastrointestinal: Soft, Nontender, Nondistended, + NGT    Incision: cranial staples c/d/i      LABS:                        10.2   11.75 )-----------( 223      ( 07 Aug 2022 06:53 )             31.8    08-06    141  |  103  |  46<H>  ----------------------------<  215<H>  4.5   |  27  |  1.01    Ca    8.9      06 Aug 2022 06:36    PTT - ( 07 Aug 2022 06:53 )  PTT:75.6 sec      08-06 @ 07:01  -  08-07 @ 07:00  --------------------------------------------------------  IN: 0 mL / OUT: 1100 mL / NET: -1100 mL    08-07 @ 07:01  -  08-07 @ 12:43  --------------------------------------------------------  IN: 270 mL / OUT: 0 mL / NET: 270 mL        MEDICATIONS:  Antibiotics:  piperacillin/tazobactam IVPB.. 3.375 Gram(s) IV Intermittent every 8 hours    Neuro:  brivaracetam  IVPB 100 milliGRAM(s) IV Intermittent two times a day  gabapentin 200 milliGRAM(s) Oral at bedtime  melatonin 5 milliGRAM(s) Oral at bedtime PRN Sleep    Cardiac:  losartan 100 milliGRAM(s) Oral daily  metoprolol tartrate 12.5 milliGRAM(s) Oral two times a day    Pulm:    GI/:  pantoprazole  Injectable 40 milliGRAM(s) IV Push every 24 hours  psyllium Powder 1 Packet(s) Enteral Tube every 12 hours  senna 2 Tablet(s) Oral at bedtime    Other:   aspirin  chewable 81 milliGRAM(s) Oral daily  atorvastatin 80 milliGRAM(s) Oral at bedtime  BACItracin   Ointment 1 Application(s) Topical two times a day  dexAMETHasone  Injectable 1 milliGRAM(s) IV Push every 12 hours  heparin  Infusion 750 Unit(s)/Hr IV Continuous <Continuous>  insulin lispro (ADMELOG) corrective regimen sliding scale   SubCutaneous every 6 hours  insulin NPH human recombinant 18 Unit(s) SubCutaneous every 6 hours    DIET: [] Regular [] CCD [] Renal [] Puree [] Dysphagia [x] Tube Feeds:     IMAGING:   < from: CT Angio Chest PE Protocol w/ IV Cont (08.03.22 @ 09:59) >  IMPRESSION:  Pulmonary embolism in the lateral segmental branch of the right middle   lobe. No evidence of right heart strain.    Interval decrease in left upper lobe mass. Unchanged obstruction of the   posterior bronchus, and stable fibroglandular lymph node.    < end of copied text >  < from: CT Head No Cont (08.03.22 @ 04:38) >    IMPRESSION:    Redemonstration of right temporal craniotomy for prior resection of a   lesion. There is a large degree of hypoattenuation throughout the right   temporal lobe and extending into the right basal ganglia and right   parietal lobe, suggestive of a combination of vasogenic edema and/or   encephalomalacia/gliosis. This results in secondary mass effect upon the   parenchyma of the right cerebral hemisphere, not significantly changed.   There is mild mass effect on the ventricular system, right greater than   left. There is secondary right to left midline shift of approximately 5.7   mm. Expected postsurgical extra-axial pneumocephalus is reidentified   without significant change. Tiny expected subdural hematoma overlying the   right anterior superior frontal lobe is reidentified without change.    Reidentified is a hypoattenuating lesion within the left medial frontal   lobe measuring 1.4 x 1.6 cm, compatible with known additional metastasis.   No significant surrounding edema.    < end of copied text >  < from: MR Head w/wo IV Cont (07.29.22 @ 10:48) >  IMPRESSION: Redemonstration of right-sided craniotomy changes status post   resection of a right temporal lobe mass with expected postoperative   changes. Residual hemorrhage and/or postoperative material within the  postoperative bed limits evaluation for enhancement.    Unchanged appearing ring-enhancing lesion in the left parafalcine   location for which a residual metastatic lesion is a possibility.    --- End of Report ---    < end of copied text >  < from: [facility] Duplex Lower Ext Vein Scan, Bilat (07.28.22 @ 12:35) >    IMPRESSION:  No evidence of deep venous thrombosis in either lower extremi    < end of copied text >  < from: TTE with Doppler (w/Cont) (07.22.22 @ 10:09) >  Conclusions:  Endocardial visualization enhanced with intravenous  injection of Ultrasonic Enhancing Agent (Lumason).  Normal left ventricular internal dimensions with discrete  upper septal hypertrophy.  Estimated ejection fraction 55%, The inferolateral wall is  akinetic.    < end of copied text >

## 2022-08-07 NOTE — PROVIDER CONTACT NOTE (OTHER) - BACKGROUND
83 y/o female s/p   R craniotomy  for metastatic lesion on 7/27, inferolateral STEMI 8/1,
Admitted s/p tumor resection.
pmhx DM, peripheral neuropathy, MI, hyperlipidemia, PAD, CAD, HTN
pt admitted with other condition of brain
s/p crani
83 y/o female s/p   R craniotomy  for metastatic lesion on 7/27, inferolateral STEMI 8/1
pt admitted with dx of other conditions of brain
sp crani for brain tumor

## 2022-08-07 NOTE — PROVIDER CONTACT NOTE (OTHER) - SITUATION
pt finger stick 275 pt also with large loose BM X3 Since shift 1900
patient has  last for 1.6 sec at 1920
pt co of chest pain 8/10
HR 45 while sleeping-Due for metoprolol. No parameters listed.
HR 47 while patient asleep. when patient is awaken HR goes up to 58
Pt s/p craniotomy, EEG monitoring
patients Blood pressure 79/54. patient is asymptomatic
Patient's FS 79 before lunch. as per patient she has a poor appetite and only wants soup. Order for Admelog 12 units before lunch.
ptt 58.3

## 2022-08-07 NOTE — PROGRESS NOTE ADULT - ASSESSMENT
[facility] 6/23/21:   s/p successful angioplasty to the mid RCA followed by intracoronary brachytherapy.  [facility] 4/14/21: POBA of mid RCA (80%)   [facility] 3/25/21: PCI/POBA to pCX 90%   Echo 7/22/22: Normal left ventricular internal dimensions with discrete upper septal hypertrophy. Estimated ejection fraction 55%, The inferolateral wall is akinetic.  Echo 8/2/22:  grossly, preserved left ventricular systolic function with segmental wall motion abnormalities. EF approximately 55%. The mid to distal inferolateral and mid to distal inferior segments are hypkinetic. The apex is akinetic. No left ventricular thrombus seen.      a/p  83 y/o female (former smoker) with PMHx of HTN, CAD sp PCi,  HLD, MI, peripheral neuropathy, DM and peripheral artery disease presented with abnl imaging on MRI as outpt      #Brain/ Lung  mass   -MRI brain showed 2 mets R temporal 4.7x3.7x3cm L frontal 1.4x1.5x1.2cm   -s/p bronchoscopy  -s/p Right craniotomy for tumor  -management per neuro-- started on antiplt/ hep as mentioned below- repeat CT head  8/3 noted -neurosx to fu   -eeg fu per neuro      #STEMI   -8/1 co cp-  found to have an inferior STEMI  -per cath lab  attending -Not a PCI candidate given potential benefits would be outweighed by the risks of the burden of anticoagulation and antiplatelet agents required for therapy  due to recent brain surgery  --on asa,  c/w hep gtt   -limited echo grossly, preserved left ventricular systolic function with segmental wall motion abnormalities. EF approximately 55%. The mid to distal inferolateral and mid to distal inferior segments are hypkinetic. The apex is akinetic. No left ventricular thrombus seen.  -c/w statin ,  bb, arb  -monitor ct head - neuro sx following     #acute PE   -c/w hep gtt     #new PAFIB   -now in NSR   -c.w BB   -ac as mentioned above     #CAD sp PCI   -antiplt/ac as mentioned above  -echo w inflat akinesis and overall preserved EF  -repeat limited echo as mentioned above   -c/w BB     #HTN  -cont current meds    35 minutes spent on total encounter; more than 50% of the visit was spent counseling and/or coordinating care by the attending physician.

## 2022-08-07 NOTE — PROGRESS NOTE ADULT - ASSESSMENT
81 y/o female (former smoker) with PMHx of HTN, CAD, HLD, MI, peripheral neuropathy, DM and peripheral artery disease presented to the ED for imaging for her head due to abnormal spine MRI by her orthopedist (Dr. Castro) during workup for her spine for her chronic back problems. The orthopedist noted something in her brain. Otherwise pt has no symptoms, stated that since 04/2022 she has had gait/balance issues. Endorsed that if she sits for a while and gets up, she feels like she is "drunk" and feels off. Denied chest pain, SOB, fevers, chills, numbness/paresthesias, muscular weakness, dizziness, abdominal pain, headaches.   MR head significant for R temporal lobe lesion with edema, midline shift and uncal herniation concerning for metastatic disease. 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      Hospital course: R crani for metastatic lesion 7/27 consistent w metastatic non small cell lung cancer, residual lesion on L side of brain. Course complicated on the hospital floor by inferolateral STEMI 8/1, pt transferred to CICU and started on hep gtt no bolus and ASA, no cath given high risk from comorbidities and recent surgery.       NSCU consulted for encephalopathy in s/o likely UTI/early urosepsis, rule out seizures    NEURO:  - no seizure on EEG  - neuro checks q 4h   -mental status significantly improved  - on zosyn for uti  - on decadron 1q12  - on brivaracetam for seizure      PULM: RA  - s/p bronchoscopy with endobronchial brushings/biopsies of an obstructing left upper lobe endobronchial lesion -> 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; No O2 requirements, pulmonary on board  - Incentive spirometry, mobilize as tolerated  - small incidental PE  - Pulmonary following    CV:  EF 55%  STEMI being treated medically, cont hep gtt goal 55-65, currently PTT 67.6, Hep @ 7.5,  monitor ptt, cont ASA 81  Pulm recs  switch to a DOAC when safe  cardiology following  cont losartan  cont lopressor  on statin  new onset afib- rate controlled  Echo-nml LVF with discrete upper septal hypertrophy, EF 55%, inferolateral wall is akinetic    RENAL: borderline PERCY w high BUN, continue hydration if ok w cardiology keep net even to 500cc+  cont straight caths, bladder [id number]  See ID for UA+      GI: NGT w TF  Repeat S&S eval- MBS on Monday  GI prophylaxis x PPI while on steroids  Bowel regimen standing    ENDO: Endo following  outpt FU for adrenal and thyroid abnormalities  continue fingersticks with NPH 18 u q6  Goal euglycemia (-180)    HEME/ONC: hep gtt, PTT at goal 75.6  small RML PE on CTA, no LV thrombus  venodynes    ID: on zosyn, 5 day course ending today then observe off abx  WBC high, could be steroid induced hyperglycemia vs new UTI  FU blood and urine cultures   ID following  MRSA swab neg     PT/OT:   - Acute rehab      [id number]    83 y/o female (former smoker) with PMHx of HTN, CAD, HLD, MI, peripheral neuropathy, DM and peripheral artery disease presented to the ED for imaging for her head due to abnormal spine MRI by her orthopedist (Dr. Castro) during workup for her spine for her chronic back problems. The orthopedist noted something in her brain. Otherwise pt has no symptoms, stated that since 04/2022 she has had gait/balance issues. Endorsed that if she sits for a while and gets up, she feels like she is "drunk" and feels off. Denied chest pain, SOB, fevers, chills, numbness/paresthesias, muscular weakness, dizziness, abdominal pain, headaches.   MR head significant for R temporal lobe lesion with edema, midline shift and uncal herniation concerning for metastatic disease. 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      Hospital course: R crani for metastatic lesion 7/27 consistent w metastatic non small cell lung cancer, residual lesion on L side of brain. Course complicated on the hospital floor by inferolateral STEMI 8/1, pt transferred to CICU and started on hep gtt no bolus and ASA, no cath given high risk from comorbidities and recent surgery.       NSCU consulted for encephalopathy in s/o likely UTI/early urosepsis, rule out seizures    NEURO:  - no seizure on EEG  - neuro checks q 4h   -mental status significantly improved  - on zosyn for uti  - on decadron 1q12  - on brivaracetam for seizure      PULM: RA  - s/p bronchoscopy with endobronchial brushings/biopsies of an obstructing left upper lobe endobronchial lesion -> 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; No O2 requirements, pulmonary on board  - Incentive spirometry, mobilize as tolerated  - small incidental PE  - Pulmonary following    CV:  EF 55%  STEMI being treated medically, cont hep gtt goal 55-65, currently PTT 67.6, Hep @ 7.5,  monitor ptt, cont ASA 81  Pulm recs  switch to a DOAC when safe  cardiology following  cont losartan  cont lopressor  on statin  new onset afib- rate controlled  Echo-nml LVF with discrete upper septal hypertrophy, EF 55%, inferolateral wall is akinetic    RENAL: borderline PERCY w high BUN, continue hydration if ok w cardiology keep net even to 500cc+  cont straight caths, bladder [id number]  See ID for UA+      GI: NGT w TF  Repeat S&S eval- MBS on Monday  GI prophylaxis x PPI while on steroids  Bowel regimen standing    ENDO: Endo following  outpt FU for adrenal and thyroid abnormalities  continue fingersticks with NPH 18 u q6  Goal euglycemia (-180)    HEME/ONC: hep gtt, PTT at goal 75.6  small RML PE on CTA, no LV thrombus  venodynes    ID: on zosyn, 5 day course ending today then observe off abx  WBC high, could be steroid induced hyperglycemia vs new UTI  FU blood and urine cultures   ID following  MRSA swab neg     PT/OT:   - Acute rehab      [facility] # [id number]

## 2022-08-07 NOTE — PROGRESS NOTE ADULT - SUBJECTIVE AND OBJECTIVE BOX
LANIE BERTRAND  MRN-[id number]    Patient is a 82y old  Female who presents with a chief complaint of brain tumor (07 Aug 2022 12:42)      Review of System  REVIEW OF SYSTEMS      General:	Denies fatigue, fevers, chills, sweats, decreased appetite.    Skin/Breast: denies pruritis, rash  	  Ophthalmologic: no change in vision or blurring  	  HEENT	Denies dry mouth, oral sores, dysphagia,  change in hearing.    Respiratory and Thorax:  cough, sob, wheeze, hemoptysis  	  Cardiovascular:	no cp , palp, orthopnea    Gastrointestinal:	no n/v/d constipation    Genitourinary:	no dysuria of frequency, no hematuria, no flank pain    Musculoskeletal:	no bone or joint pain. no muscle aches.     Neurological:	no change in sensory or motor function. no headache. no weakness.     Psychiatric:	no depression, no anxiety, insomnia.     Hematology/Lymphatics:	no bleeding or bruising        Current Meds  MEDICATIONS  (STANDING):  aspirin  chewable 81 milliGRAM(s) Oral daily  atorvastatin 80 milliGRAM(s) Oral at bedtime  BACItracin   Ointment 1 Application(s) Topical two times a day  brivaracetam  IVPB 100 milliGRAM(s) IV Intermittent two times a day  dexAMETHasone  Injectable 1 milliGRAM(s) IV Push every 12 hours  gabapentin 200 milliGRAM(s) Oral at bedtime  heparin  Infusion 750 Unit(s)/Hr (7.5 mL/Hr) IV Continuous <Continuous>  insulin lispro (ADMELOG) corrective regimen sliding scale   SubCutaneous every 6 hours  insulin NPH human recombinant 18 Unit(s) SubCutaneous every 6 hours  losartan 100 milliGRAM(s) Oral daily  metoprolol tartrate 12.5 milliGRAM(s) Oral two times a day  pantoprazole  Injectable 40 milliGRAM(s) IV Push every 24 hours  piperacillin/tazobactam IVPB.. 3.375 Gram(s) IV Intermittent every 8 hours  psyllium Powder 1 Packet(s) Enteral Tube every 12 hours  senna 2 Tablet(s) Oral at bedtime    MEDICATIONS  (PRN):  melatonin 5 milliGRAM(s) Oral at bedtime PRN Sleep      Vitals  Vital Signs Last 24 Hrs  T(C): 36.7 (07 Aug 2022 09:47), Max: 36.9 (07 Aug 2022 04:53)  T(F): 98 (07 Aug 2022 09:47), Max: 98.4 (07 Aug 2022 04:53)  HR: 67 (07 Aug 2022 09:47) (67 - 73)  BP: 98/63 (07 Aug 2022 09:47) (98/63 - 126/69)  BP(mean): --  RR: 18 (07 Aug 2022 09:47) (18 - 18)  SpO2: 99% (07 Aug 2022 09:47) (95% - 99%)    Parameters below as of 07 Aug 2022 09:47  Patient On (Oxygen Delivery Method): room air        Physical Exam  PHYSICAL EXAM:      Constitutional: NAD    Eyes: PERRLA EOMI, anicteric sclera    Heent :No oral sores, no pharyngeal injection. moist mucosa.    Neck: supple, no jvd, no LAD    Respiratory: CTA b/l     Cardiovascular: s1s2, no m/g/r    Gastrointestinal: soft, nt, nd, + BS    Extremities: no c/c/e    Neurological:A&O x 3 moves all ext.    Skin: no rash on exposed skin    Lymph Nodes: no lymphadenopathy.              Lab  CBC Full  -  ( 07 Aug 2022 06:53 )  WBC Count : 11.75 K/uL  RBC Count : 3.30 M/uL  Hemoglobin : 10.2 g/dL  Hematocrit : 31.8 %  Platelet Count - Automated : 223 K/uL  Mean Cell Volume : 96.4 fl  Mean Cell Hemoglobin : 30.9 pg  Mean Cell Hemoglobin Concentration : 32.1 gm/dL  Auto Neutrophil # : x  Auto Lymphocyte # : x  Auto Monocyte # : x  Auto Eosinophil # : x  Auto Basophil # : x  Auto Neutrophil % : x  Auto Lymphocyte % : x  Auto Monocyte % : x  Auto Eosinophil % : x  Auto Basophil % : x    08-06    141  |  103  |  46<H>  ----------------------------<  215<H>  4.5   |  27  |  1.01    Ca    8.9      06 Aug 2022 06:36      PTT - ( 07 Aug 2022 06:53 )  PTT:75.6 sec    Rad:    Assessment/Plan   LANIE BERTRAND  MRN-[id number]    Patient is a 82y old  Female who presents with a chief complaint of brain tumor (07 Aug 2022 12:42)      Review of System    Pt seen with dtr at bedside. No complaints  no f./c/s  no Ha or dizziness  no cp or palp.  no cough or sob.  no n/v/d  no dysuria or hematuria    Current Meds  MEDICATIONS  (STANDING):  aspirin  chewable 81 milliGRAM(s) Oral daily  atorvastatin 80 milliGRAM(s) Oral at bedtime  BACItracin   Ointment 1 Application(s) Topical two times a day  brivaracetam  IVPB 100 milliGRAM(s) IV Intermittent two times a day  dexAMETHasone  Injectable 1 milliGRAM(s) IV Push every 12 hours  gabapentin 200 milliGRAM(s) Oral at bedtime  heparin  Infusion 750 Unit(s)/Hr (7.5 mL/Hr) IV Continuous <Continuous>  insulin lispro (ADMELOG) corrective regimen sliding scale   SubCutaneous every 6 hours  insulin NPH human recombinant 18 Unit(s) SubCutaneous every 6 hours  losartan 100 milliGRAM(s) Oral daily  metoprolol tartrate 12.5 milliGRAM(s) Oral two times a day  pantoprazole  Injectable 40 milliGRAM(s) IV Push every 24 hours  piperacillin/tazobactam IVPB.. 3.375 Gram(s) IV Intermittent every 8 hours  psyllium Powder 1 Packet(s) Enteral Tube every 12 hours  senna 2 Tablet(s) Oral at bedtime    MEDICATIONS  (PRN):  melatonin 5 milliGRAM(s) Oral at bedtime PRN Sleep      Vital Signs Last 24 Hrs  T(C): 36.7 (07 Aug 2022 09:47), Max: 36.9 (07 Aug 2022 04:53)  T(F): 98 (07 Aug 2022 09:47), Max: 98.4 (07 Aug 2022 04:53)  HR: 67 (07 Aug 2022 09:47) (67 - 73)  BP: 98/63 (07 Aug 2022 09:47) (98/63 - 126/69)  BP(mean): --  RR: 18 (07 Aug 2022 09:47) (18 - 18)  SpO2: 99% (07 Aug 2022 09:47) (95% - 99%)    Parameters below as of 07 Aug 2022 09:47  Patient On (Oxygen Delivery Method): room air    PHYSICAL EXAM:    Constitutional: NAD    Eyes: PERRLA EOMI, anicteric sclera    Heent :No oral sores, no pharyngeal injection. moist mucosa.    Neck: supple, no jvd, no LAD    Respiratory: CTA b/l     Cardiovascular: s1s2, no m/g/r    Gastrointestinal: soft, nt, nd, + BS    Extremities: no c/c/e    Neurological:A&O x 3 moves all ext.    Skin: no rash on exposed skin    Lymph Nodes: no lymphadenopathy.    Lab  CBC Full  -  ( 07 Aug 2022 06:53 )  WBC Count : 11.75 K/uL  RBC Count : 3.30 M/uL  Hemoglobin : 10.2 g/dL  Hematocrit : 31.8 %  Platelet Count - Automated : 223 K/uL  Mean Cell Volume : 96.4 fl  Mean Cell Hemoglobin : 30.9 pg  Mean Cell Hemoglobin Concentration : 32.1 gm/dL  Auto Neutrophil # : x  Auto Lymphocyte # : x  Auto Monocyte # : x  Auto Eosinophil # : x  Auto Basophil # : x  Auto Neutrophil % : x  Auto Lymphocyte % : x  Auto Monocyte % : x  Auto Eosinophil % : x  Auto Basophil % : x    08-06    141  |  103  |  46<H>  ----------------------------<  215<H>  4.5   |  27  |  1.01    Ca    8.9      06 Aug 2022 06:36      PTT - ( 07 Aug 2022 06:53 )  PTT:75.6 sec    Rad:    Assessment/Plan

## 2022-08-07 NOTE — PROVIDER CONTACT NOTE (OTHER) - RECOMMENDATIONS
adjusting tube feeds?
EKG
Notify Provider
EKG monitoring , repeat cardiac enzymes.
ekg, vs, labs
Hold Admelog 12 units.
titrate heparin up
Notify Provider

## 2022-08-07 NOTE — PROGRESS NOTE ADULT - SUBJECTIVE AND OBJECTIVE BOX
Chief complaint    Patient is a 82y old  Female who presents with a chief complaint of abnormal imaging (07 Aug 2022 13:34)   Review of systems  Patient in bed, appears comfortable.    Labs and Fingersticks  CAPILLARY BLOOD GLUCOSE      POCT Blood Glucose.: 216 mg/dL (07 Aug 2022 11:39)  POCT Blood Glucose.: 211 mg/dL (07 Aug 2022 04:40)  POCT Blood Glucose.: 122 mg/dL (06 Aug 2022 21:51)  POCT Blood Glucose.: 225 mg/dL (06 Aug 2022 16:40)      Anion Gap, Serum: 11 (08-06 @ 06:36)      Calcium, Total Serum: 8.9 (08-06 @ 06:36)          08-06    141  |  103  |  46<H>  ----------------------------<  215<H>  4.5   |  27  |  1.01    Ca    8.9      06 Aug 2022 06:36                          10.2   11.75 )-----------( 223      ( 07 Aug 2022 06:53 )             31.8     Medications  MEDICATIONS  (STANDING):  aspirin  chewable 81 milliGRAM(s) Oral daily  atorvastatin 80 milliGRAM(s) Oral at bedtime  BACItracin   Ointment 1 Application(s) Topical two times a day  brivaracetam  IVPB 100 milliGRAM(s) IV Intermittent two times a day  dexAMETHasone  Injectable 1 milliGRAM(s) IV Push every 12 hours  gabapentin 200 milliGRAM(s) Oral at bedtime  heparin  Infusion 750 Unit(s)/Hr (7.5 mL/Hr) IV Continuous <Continuous>  insulin lispro (ADMELOG) corrective regimen sliding scale   SubCutaneous every 6 hours  losartan 100 milliGRAM(s) Oral daily  metoprolol tartrate 12.5 milliGRAM(s) Oral two times a day  pantoprazole  Injectable 40 milliGRAM(s) IV Push every 24 hours  piperacillin/tazobactam IVPB.. 3.375 Gram(s) IV Intermittent every 8 hours  psyllium Powder 1 Packet(s) Enteral Tube every 12 hours  senna 2 Tablet(s) Oral at bedtime      Physical Exam  General: Patient comfortable in bed  Vital Signs Last 12 Hrs  T(F): 98.5 (08-07-22 @ 14:52), Max: 98.5 (08-07-22 @ 14:52)  HR: 68 (08-07-22 @ 14:52) (67 - 68)  BP: 105/68 (08-07-22 @ 14:52) (98/63 - 105/68)  BP(mean): --  RR: 18 (08-07-22 @ 14:52) (18 - 18)  SpO2: 98% (08-07-22 @ 14:52) (98% - 99%)  Neck: No palpable thyroid nodules.  CVS: S1S2, No murmurs  Respiratory: No wheezing, no crepitations  GI: Abdomen soft, bowel sounds positive  Musculoskeletal:  edema lower extremities.     Diagnostics

## 2022-08-07 NOTE — PROVIDER CONTACT NOTE (OTHER) - NAME OF MD/NP/PA/DO NOTIFIED:
Miko Connelly
DAVID Owen
Johnny HERNANDEZ neuro
DAVID Chavez
DAVID Pinedo
NSCU Team
DAVID graves
NSCU
Soco Sage MD

## 2022-08-07 NOTE — PROVIDER CONTACT NOTE (OTHER) - ACTION/TREATMENT ORDERED:
12 lead EKG, BMP and Troponin level ordered and send
hold Metoprolol dose at this time.
start heparin gtt at 8ml/hr repeat ptt in 6hrs.
EKG  completed. neuro pa called for interpretation and patient eval
NNO at this time
as per nscu team continue with tube feeds okay to give admelog 6units as per sliding scale, will add nph q6
PA Semianow notified and aware - 250 NS bolus administered, blood pressure regulated to 125/60
Per DAVID Parry ekg, vs labs will call cardio consult.
Shalini held.

## 2022-08-07 NOTE — PROVIDER CONTACT NOTE (OTHER) - DATE AND TIME:
04-Aug-2022 19:09
21-Jul-2022 01:45
25-Jul-2022 21:30
28-Jul-2022 12:15
04-Aug-2022 22:11
05-Aug-2022 03:48
07-Aug-2022 19:25
01-Aug-2022 14:55
21-Jul-2022 03:54

## 2022-08-07 NOTE — PROGRESS NOTE ADULT - ASSESSMENT
Patient is an 82 year old female that presented to ED with abnormal MRI reading (discovered by patient's orthopedic), also with gait/balance issues since April, found to have R temporal brain mass c/f metastatic brain lesion now s/p craniotomy 7/27. While pending surgery, AC held, and on 8/1 presented with CP found to have STEMI on EKG. ASA loaded and started on hep gtt 8/1 and transferred to CCU for further management.    ====================  NEUROLOGY    #AMS  - Repeat CTH x5 unchanged from prior  - UA+, started on zosyn day 4    #Brain lesion  - s/p craniotomy for resection of brain mass 7/27, path showing NSC carcinoma (favor adeno)  - c/w depakote  - c/w dexamethasone  - repeat CTH 1 hour post AC resumption unremarkable  - goal -160     #Peripheral neuropathy  - c/w gabapentin    ====================  CARDIOVASCULAR    #CAD s/p PCI  #STEMI  - TTE 7/22 with normal LV internal dimensions with discrete upper septal hypertrophy. EF 55%, inferolateral wall is akinetic.  - c/w lopressor 25 bid  - c/w atorva 40  - EKG 8/1 with STEMI in multiple leads, r/p EKG more pronounced STEMI   - too high risk for cath given recent craniotomy  - s/p ASA load, starting heparin gtt      PE  -  on iv heparin    #HTN  - consider decreasing  losartan to 50 as pt is hypotensive    ====================  RESPIRATORY    #Metastatic lung cancer  - s/p bronchoscopy - 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  - no indication for pulmonary medications or antibiotics  - Pulm following, appreciate recs    - Pt tachypneic, borderline HoTN 8/2 c/f PE, CTA chest obtained to r/o PE  - iv heparin for PE  - satting well on RA  - incentive mario    ====================  GI    - NG tube feeds  - pantoprazole 40 qD  - senna, dulcolax    ====================  ENDOCRINE    #DM  - NPH 6   - moderate dose correctional scale  - Endo following, appreciate recs     ====================  RENAL    - Monitor UOP, lytes    ====================  HEMATOLOGY/ONCOLOGY    #Leukocytosis  - is on dexamethasone,     #Metastatic lung cancer  - Onc following, recs appreciated  - Path +NSCC    #DVT PPx  - c/w ASA, heparin gtt    ====================  INFECTIOUS DISEASES    - on abx for poss uti x 5 days

## 2022-08-07 NOTE — PROVIDER CONTACT NOTE (OTHER) - ASSESSMENT
Blood pressure 79/54 - patient is asymptomatic and responsive
No acute distress noted
Patient alert and oriented x 4 , Vitals stable. No changes noted in neuro checks.
Pt bradycardic on monitor to 46 sustaining now in 50's , Lethargy noted. Pt denies chest pain at this time spo2 >90 on 2l
pt in pain, not comfortable
pt a&ox3, Glucerna 1.5 currently at 50cc/hr goal at 60.  pt finger stick 275, given 10 units lantus 2200 in addition to 8units admelog pt also with large loose BM X3 brown color, Since shift 1900
pt a&ox4, spo2  100%, pulse 67, bp 138/62 map 89. no s/sx of bleeding nored.
vital signs stable. Noc/o chestpain or anydistrss. see flow sheet for vital signs
No acute distress noted

## 2022-08-07 NOTE — PROGRESS NOTE ADULT - SUBJECTIVE AND OBJECTIVE BOX
CARDIOLOGY FOLLOW UP - Dr. Alcaraz  Date of Service: 8/7/22  CC: no events    Review of Systems:  Constitutional: No fever, weight loss, or fatigue  Respiratory: No cough, wheezing, or hemoptysis, no shortness of breath  Cardiovascular: No chest pain, palpitations, passing out, dizziness, or leg swelling  Gastrointestinal: No abd or epigastric pain. No nausea, vomiting, or hematemesis; no diarrhea or consiptaiton, no melena or hematochezia  Vascular: No edema     TELEMETRY:    PHYSICAL EXAM:  T(C): 36.9 (08-07-22 @ 04:53), Max: 36.9 (08-07-22 @ 04:53)  HR: 67 (08-07-22 @ 04:53) (67 - 75)  BP: 104/64 (08-07-22 @ 04:53) (89/56 - 126/69)  RR: 18 (08-07-22 @ 04:53) (18 - 18)  SpO2: 99% (08-07-22 @ 04:53) (95% - 99%)  Wt(kg): --  I&O's Summary    06 Aug 2022 07:01  -  07 Aug 2022 07:00  --------------------------------------------------------  IN: 0 mL / OUT: 1100 mL / NET: -1100 mL        Appearance: Normal	  Cardiovascular: Normal S1 S2,RRR, No JVD, No murmurs  Respiratory: Lungs clear to auscultation	  Gastrointestinal:  Soft, Non-tender, + BS	  Extremities: Normal range of motion, No clubbing, cyanosis or edema  Vascular: Peripheral pulses palpable 2+ bilaterally       Home Medications:  acetaminophen 325 mg oral tablet: 2 tab(s) orally every 6 hours, As needed, Temp greater or equal to 38C (100.4F), Mild Pain (1 - 3) (01 Aug 2022 13:52)  amLODIPine 5 mg oral tablet: 1 tab(s) orally once a day (01 Aug 2022 13:52)  atorvastatin 40 mg oral tablet: 1 tab(s) orally once a day (at bedtime) (01 Aug 2022 13:52)  bisacodyl 5 mg oral delayed release tablet: 1 tab(s) orally once a day, As needed, Constipation (01 Aug 2022 13:52)  dexamethasone 2 mg oral tablet: 1 tab po every 8 hours for 2 days, then 1 tab po q12 hours x2 days, then 1 tab po daily x3 days, then stop (01 Aug 2022 13:52)  divalproex sodium 500 mg oral delayed release tablet: 1 tab(s) orally every 8 hours (01 Aug 2022 13:52)  enoxaparin: 40 milligram(s) subcutaneous once a day (at bedtime) (01 Aug 2022 13:52)  gabapentin 100 mg oral capsule: 2 cap(s) orally once a day (at bedtime) (01 Aug 2022 13:52)  insulin glargine 100 units/mL subcutaneous solution: 15 unit(s) subcutaneous once a day (at bedtime) (01 Aug 2022 13:52)  insulin lispro 100 units/mL injectable solution: 5 unit(s) subcutaneous once a day before lunch (01 Aug 2022 13:52)  insulin lispro 100 units/mL injectable solution: 7 unit(s) subcutaneous once a day before breakfast (01 Aug 2022 13:52)  insulin lispro 100 units/mL injectable solution: 7 unit(s) subcutaneous once a day before dinner (01 Aug 2022 13:52)  insulin lispro 100 units/mL injectable solution: moderate dose sliding scale as per hospital/facility protocol, 3 times a day (before meals) (01 Aug 2022 13:52)  losartan 100 mg oral tablet: 1 tab(s) orally once a day (01 Aug 2022 13:52)  melatonin 5 mg oral tablet: 1 tab(s) orally once a day (at bedtime), As needed, Sleep (01 Aug 2022 13:52)  metoprolol tartrate 25 mg oral tablet: 1 tab(s) orally 2 times a day (01 Aug 2022 13:52)  oxyCODONE 10 mg oral tablet: 1 tab(s) orally every 4 hours, As needed, Severe Pain (7 - 10) (01 Aug 2022 13:52)  oxyCODONE 5 mg oral tablet: 1 tab(s) orally every 4 hours, As needed, Moderate Pain (4 - 6) (01 Aug 2022 13:52)  pantoprazole 40 mg oral delayed release tablet: 1 tab(s) orally once a day (before a meal) (01 Aug 2022 13:52)  polyethylene glycol 3350 oral powder for reconstitution: 17 gram(s) orally 2 times a day (01 Aug 2022 13:52)  senna leaf extract oral tablet: 2 tab(s) orally once a day (at bedtime) (01 Aug 2022 13:52)        MEDICATIONS  (STANDING):  aspirin  chewable 81 milliGRAM(s) Oral daily  atorvastatin 80 milliGRAM(s) Oral at bedtime  BACItracin   Ointment 1 Application(s) Topical two times a day  brivaracetam  IVPB 100 milliGRAM(s) IV Intermittent two times a day  dexAMETHasone  Injectable 1 milliGRAM(s) IV Push every 12 hours  gabapentin 200 milliGRAM(s) Oral at bedtime  heparin  Infusion 750 Unit(s)/Hr (7.5 mL/Hr) IV Continuous <Continuous>  insulin lispro (ADMELOG) corrective regimen sliding scale   SubCutaneous every 6 hours  insulin NPH human recombinant 18 Unit(s) SubCutaneous every 6 hours  losartan 100 milliGRAM(s) Oral daily  metoprolol tartrate 12.5 milliGRAM(s) Oral two times a day  pantoprazole  Injectable 40 milliGRAM(s) IV Push every 24 hours  piperacillin/tazobactam IVPB.. 3.375 Gram(s) IV Intermittent every 8 hours  psyllium Powder 1 Packet(s) Enteral Tube every 12 hours  senna 2 Tablet(s) Oral at bedtime        EKG:  RADIOLOGY:  DIAGNOSTIC TESTING:  [ ] Echocardiogram:  [ ] Catherterization:  [ ] Stress Test:  OTHER:     LABS:	 	                          10.2   11.75 )-----------( 223      ( 07 Aug 2022 06:53 )             31.8     08-06    141  |  103  |  46<H>  ----------------------------<  215<H>  4.5   |  27  |  1.01    Ca    8.9      06 Aug 2022 06:36        PTT - ( 07 Aug 2022 06:53 )  PTT:75.6 sec    CARDIAC MARKERS:

## 2022-08-07 NOTE — PROGRESS NOTE ADULT - ASSESSMENT
1) Lung and brain masses,  metastatic process.  Lung bx c/w metastatic NSCLC, adenocarcinoma  - pt s/p nsgy, resection of brain met  patient has also been seen by rad/Onc. Getting steroids and keppra.   Further med onc mgmt as outpt  pathology being sent for foundation testing.     2)Leukocytosis- likely reactive/steroid related    3) CAD- s/p MI. AFIB mgmt as per mrd, cv    4) PE- ac as per med    5) UTI- mgmt as per med, ID 1) Lung and brain masses,  metastatic process.  Lung bx c/w metastatic NSCLC, adenocarcinoma  - pt s/p nsgy, resection of brain met  patient has also been seen by rad/Onc. Getting steroids and keppra.   Further med onc mgmt as outpt  pathology being sent for foundation testing.     2)Leukocytosis- likely reactive/steroid related    3) CAD- s/p MI. AFIB mgmt as per mrd, cv    4) PE- ac as per med    5) UTI- mgmt as per med, ID    6) anemia - post-op. monitor cbc    case and plan d/w pt and dtr at bedside

## 2022-08-07 NOTE — PROGRESS NOTE ADULT - ASSESSMENT
83 yo female with history of HTN,CAD,PAD,MI, and DM admitted July 19th with gait insatbility and OPD scans showing brain lesions.  She was found to have lung cancer, bronch on 7/20 with poorly differentiated cancer and CT scan with brain mets.  She is s/p RT frontal carniotomy on 7/27 for metastatic adenocarcinoma.  She has a STEMI on 8/1.  Her recent events from 8/3-8/4  include a leukocytosis, lethargy, and she is now known to have PE.  Due to concerns of possible infection, zosyn was started empirically.  No fever, possible post obstructive pneumonia,although X rays have not shown progressive infiltrate .Regardless,  she is being covered broadly although not clear if infection is active.  Her blood cultures remain  negative so far.Mixed GNR's in the urine may reflect colonizers,she requires ISC,  although she is being covered.  CTA with improvement in known ANTHONY mass.Her leukocytosis may simply be reactive to PE/STEMI/Surgery/Cancer?steroids  She appears clinically stable, no clinical signs of active infection.  Mixed GNR in urine are sensitive to zosyn.  Suggest:  1. Zosyn for now, day 5, perhaps limiting to a 5 day course is adequate to address any concern of infection.  2.Anticoagulation per NS  3 Steroid taper per NS  4,Favor observing off antibiotics after todays doses.

## 2022-08-08 LAB
ANION GAP SERPL CALC-SCNC: 10 MMOL/L — SIGNIFICANT CHANGE UP (ref 5–17)
ANION GAP SERPL CALC-SCNC: 8 MMOL/L — SIGNIFICANT CHANGE UP (ref 5–17)
ANION GAP SERPL CALC-SCNC: 9 MMOL/L — SIGNIFICANT CHANGE UP (ref 5–17)
APTT BLD: 67.4 SEC — HIGH (ref 27.5–35.5)
BUN SERPL-MCNC: 49 MG/DL — HIGH (ref 7–23)
BUN SERPL-MCNC: 50 MG/DL — HIGH (ref 7–23)
BUN SERPL-MCNC: 50 MG/DL — HIGH (ref 7–23)
CALCIUM SERPL-MCNC: 9 MG/DL — SIGNIFICANT CHANGE UP (ref 8.4–10.5)
CALCIUM SERPL-MCNC: 9.1 MG/DL — SIGNIFICANT CHANGE UP (ref 8.4–10.5)
CALCIUM SERPL-MCNC: 9.2 MG/DL — SIGNIFICANT CHANGE UP (ref 8.4–10.5)
CHLORIDE SERPL-SCNC: 102 MMOL/L — SIGNIFICANT CHANGE UP (ref 96–108)
CHLORIDE SERPL-SCNC: 102 MMOL/L — SIGNIFICANT CHANGE UP (ref 96–108)
CHLORIDE SERPL-SCNC: 103 MMOL/L — SIGNIFICANT CHANGE UP (ref 96–108)
CK SERPL-CCNC: 40 U/L — SIGNIFICANT CHANGE UP (ref 25–170)
CO2 SERPL-SCNC: 25 MMOL/L — SIGNIFICANT CHANGE UP (ref 22–31)
CO2 SERPL-SCNC: 27 MMOL/L — SIGNIFICANT CHANGE UP (ref 22–31)
CO2 SERPL-SCNC: 27 MMOL/L — SIGNIFICANT CHANGE UP (ref 22–31)
CREAT SERPL-MCNC: 0.98 MG/DL — SIGNIFICANT CHANGE UP (ref 0.5–1.3)
CREAT SERPL-MCNC: 1.09 MG/DL — SIGNIFICANT CHANGE UP (ref 0.5–1.3)
CREAT SERPL-MCNC: 1.21 MG/DL — SIGNIFICANT CHANGE UP (ref 0.5–1.3)
CULTURE RESULTS: SIGNIFICANT CHANGE UP
CULTURE RESULTS: SIGNIFICANT CHANGE UP
EGFR: 45 ML/MIN/1.73M2 — LOW
EGFR: 51 ML/MIN/1.73M2 — LOW
EGFR: 58 ML/MIN/1.73M2 — LOW
GLUCOSE BLDC GLUCOMTR-MCNC: 148 MG/DL — HIGH (ref 70–99)
GLUCOSE BLDC GLUCOMTR-MCNC: 191 MG/DL — HIGH (ref 70–99)
GLUCOSE BLDC GLUCOMTR-MCNC: 206 MG/DL — HIGH (ref 70–99)
GLUCOSE BLDC GLUCOMTR-MCNC: 219 MG/DL — HIGH (ref 70–99)
GLUCOSE BLDC GLUCOMTR-MCNC: 258 MG/DL — HIGH (ref 70–99)
GLUCOSE SERPL-MCNC: 142 MG/DL — HIGH (ref 70–99)
GLUCOSE SERPL-MCNC: 157 MG/DL — HIGH (ref 70–99)
GLUCOSE SERPL-MCNC: 267 MG/DL — HIGH (ref 70–99)
HCT VFR BLD CALC: 32 % — LOW (ref 34.5–45)
HGB BLD-MCNC: 10.3 G/DL — LOW (ref 11.5–15.5)
MCHC RBC-ENTMCNC: 31.3 PG — SIGNIFICANT CHANGE UP (ref 27–34)
MCHC RBC-ENTMCNC: 32.2 GM/DL — SIGNIFICANT CHANGE UP (ref 32–36)
MCV RBC AUTO: 97.3 FL — SIGNIFICANT CHANGE UP (ref 80–100)
NRBC # BLD: 0 /100 WBCS — SIGNIFICANT CHANGE UP (ref 0–0)
PLATELET # BLD AUTO: 240 K/UL — SIGNIFICANT CHANGE UP (ref 150–400)
POTASSIUM SERPL-MCNC: 5 MMOL/L — SIGNIFICANT CHANGE UP (ref 3.5–5.3)
POTASSIUM SERPL-MCNC: 5.2 MMOL/L — SIGNIFICANT CHANGE UP (ref 3.5–5.3)
POTASSIUM SERPL-MCNC: 5.8 MMOL/L — HIGH (ref 3.5–5.3)
POTASSIUM SERPL-SCNC: 5 MMOL/L — SIGNIFICANT CHANGE UP (ref 3.5–5.3)
POTASSIUM SERPL-SCNC: 5.2 MMOL/L — SIGNIFICANT CHANGE UP (ref 3.5–5.3)
POTASSIUM SERPL-SCNC: 5.8 MMOL/L — HIGH (ref 3.5–5.3)
RBC # BLD: 3.29 M/UL — LOW (ref 3.8–5.2)
RBC # FLD: 12.6 % — SIGNIFICANT CHANGE UP (ref 10.3–14.5)
SODIUM SERPL-SCNC: 137 MMOL/L — SIGNIFICANT CHANGE UP (ref 135–145)
SODIUM SERPL-SCNC: 138 MMOL/L — SIGNIFICANT CHANGE UP (ref 135–145)
SODIUM SERPL-SCNC: 138 MMOL/L — SIGNIFICANT CHANGE UP (ref 135–145)
SPECIMEN SOURCE: SIGNIFICANT CHANGE UP
SPECIMEN SOURCE: SIGNIFICANT CHANGE UP
WBC # BLD: 12.58 K/UL — HIGH (ref 3.8–10.5)
WBC # FLD AUTO: 12.58 K/UL — HIGH (ref 3.8–10.5)

## 2022-08-08 PROCEDURE — 93321 DOPPLER ECHO F-UP/LMTD STD: CPT | Mod: 26

## 2022-08-08 PROCEDURE — 74230 X-RAY XM SWLNG FUNCJ C+: CPT | Mod: 26

## 2022-08-08 PROCEDURE — 93308 TTE F-UP OR LMTD: CPT | Mod: 26

## 2022-08-08 RX ORDER — INSULIN LISPRO 100/ML
8 VIAL (ML) SUBCUTANEOUS
Refills: 0 | Status: DISCONTINUED | OUTPATIENT
Start: 2022-08-08 | End: 2022-08-09

## 2022-08-08 RX ORDER — INSULIN GLARGINE 100 [IU]/ML
24 INJECTION, SOLUTION SUBCUTANEOUS AT BEDTIME
Refills: 0 | Status: DISCONTINUED | OUTPATIENT
Start: 2022-08-08 | End: 2022-08-09

## 2022-08-08 RX ORDER — HUMAN INSULIN 100 [IU]/ML
17 INJECTION, SUSPENSION SUBCUTANEOUS EVERY 8 HOURS
Refills: 0 | Status: DISCONTINUED | OUTPATIENT
Start: 2022-08-08 | End: 2022-08-08

## 2022-08-08 RX ORDER — SODIUM ZIRCONIUM CYCLOSILICATE 10 G/10G
10 POWDER, FOR SUSPENSION ORAL ONCE
Refills: 0 | Status: COMPLETED | OUTPATIENT
Start: 2022-08-08 | End: 2022-08-08

## 2022-08-08 RX ADMIN — Medication 1 MILLIGRAM(S): at 05:10

## 2022-08-08 RX ADMIN — Medication 2: at 21:49

## 2022-08-08 RX ADMIN — Medication 1 APPLICATION(S): at 17:23

## 2022-08-08 RX ADMIN — Medication 1 PACKET(S): at 05:25

## 2022-08-08 RX ADMIN — ATORVASTATIN CALCIUM 80 MILLIGRAM(S): 80 TABLET, FILM COATED ORAL at 21:43

## 2022-08-08 RX ADMIN — Medication 25 MILLIGRAM(S): at 05:11

## 2022-08-08 RX ADMIN — SODIUM ZIRCONIUM CYCLOSILICATE 10 GRAM(S): 10 POWDER, FOR SUSPENSION ORAL at 09:23

## 2022-08-08 RX ADMIN — LOSARTAN POTASSIUM 100 MILLIGRAM(S): 100 TABLET, FILM COATED ORAL at 05:11

## 2022-08-08 RX ADMIN — Medication 8 UNIT(S): at 17:28

## 2022-08-08 RX ADMIN — Medication 4: at 12:53

## 2022-08-08 RX ADMIN — BRIVARACETAM 240 MILLIGRAM(S): 25 TABLET, FILM COATED ORAL at 17:24

## 2022-08-08 RX ADMIN — INSULIN GLARGINE 24 UNIT(S): 100 INJECTION, SOLUTION SUBCUTANEOUS at 21:48

## 2022-08-08 RX ADMIN — Medication 1 PACKET(S): at 17:23

## 2022-08-08 RX ADMIN — Medication 1 MILLIGRAM(S): at 17:22

## 2022-08-08 RX ADMIN — Medication 5 MILLIGRAM(S): at 21:48

## 2022-08-08 RX ADMIN — HUMAN INSULIN 16 UNIT(S): 100 INJECTION, SUSPENSION SUBCUTANEOUS at 05:23

## 2022-08-08 RX ADMIN — Medication 1 APPLICATION(S): at 05:11

## 2022-08-08 RX ADMIN — BRIVARACETAM 240 MILLIGRAM(S): 25 TABLET, FILM COATED ORAL at 05:09

## 2022-08-08 RX ADMIN — Medication 81 MILLIGRAM(S): at 12:55

## 2022-08-08 RX ADMIN — SENNA PLUS 2 TABLET(S): 8.6 TABLET ORAL at 21:42

## 2022-08-08 RX ADMIN — PANTOPRAZOLE SODIUM 40 MILLIGRAM(S): 20 TABLET, DELAYED RELEASE ORAL at 17:23

## 2022-08-08 RX ADMIN — Medication 25 MILLIGRAM(S): at 17:22

## 2022-08-08 RX ADMIN — HEPARIN SODIUM 7.5 UNIT(S)/HR: 5000 INJECTION INTRAVENOUS; SUBCUTANEOUS at 05:56

## 2022-08-08 RX ADMIN — Medication 6: at 05:23

## 2022-08-08 RX ADMIN — GABAPENTIN 200 MILLIGRAM(S): 400 CAPSULE ORAL at 21:42

## 2022-08-08 NOTE — PROGRESS NOTE ADULT - ASSESSMENT
83 yo female with history of HTN,CAD,PAD,MI, and DM admitted July 19th with gait insatbility and OPD scans showing brain lesions.  She was found to have lung cancer, bronch on 7/20 with poorly differentiated cancer and CT scan with brain mets.  She is s/p RT frontal craniotomy  on 7/27 for metastatic adenocarcinoma.  She has a STEMI on 8/1.  Her recent events from 8/3-8/4  include a leukocytosis, lethargy, and she is now known to have PE.  Due to concerns of possible infection, zosyn was started empirically.  No fever, possible post obstructive pneumonia,although X rays have not shown progressive infiltrate .Regardless,  she is being covered broadly although not clear if infection is active.  Her blood cultures remain  negative so far.Mixed GNR's in the urine may reflect colonizers,she requires ISC,  although she is being covered.  CTA with improvement in known ANTHONY mass.Her leukocytosis may simply be reactive to PE/STEMI/Surgery/Cancer?steroids  She appears clinically stable, no clinical signs of active infection.  Mixed GNR in urine are sensitive to zosyn.(E Coli and Klebsiella)  5 day course of zosyn completed 8/7, she is being monitored off antibiotics  Suggest:  1.Monitor off antibiotics  2.Anticoagulation per NS  3 Steroid taper per NS  4,Additional ID w/u as needed

## 2022-08-08 NOTE — SWALLOW VFSS/MBS ASSESSMENT ADULT - ADDITIONAL RECOMMENDATIONS
Maintain good oral hygiene   Service will continue to follow to monitor tolerance of recommended diet.

## 2022-08-08 NOTE — PROGRESS NOTE ADULT - SUBJECTIVE AND OBJECTIVE BOX
CARDIOLOGY FOLLOW UP - Dr. Alcaraz  DATE OF SERVICE: 8/8/22     CC no cp or sob       REVIEW OF SYSTEMS:  CONSTITUTIONAL: No fever, weight loss, or fatigue  RESPIRATORY: No cough, wheezing, chills or hemoptysis; No Shortness of Breath  CARDIOVASCULAR: No chest pain, palpitations, passing out, dizziness, or leg swelling  GASTROINTESTINAL: No abdominal or epigastric pain. No nausea, vomiting, or hematemesis; No diarrhea or constipation. No melena or hematochezia.  VASCULAR: No edema     PHYSICAL EXAM:  T(C): 37.2 (08-08-22 @ 13:14), Max: 37.2 (08-08-22 @ 13:14)  HR: 72 (08-08-22 @ 13:14) (65 - 72)  BP: 104/67 (08-08-22 @ 13:14) (96/59 - 136/75)  RR: 18 (08-08-22 @ 13:14) (18 - 18)  SpO2: 96% (08-08-22 @ 13:14) (94% - 98%)  Wt(kg): --  I&O's Summary    07 Aug 2022 07:01  -  08 Aug 2022 07:00  --------------------------------------------------------  IN: 1935 mL / OUT: 1100 mL / NET: 835 mL    08 Aug 2022 07:01  -  08 Aug 2022 13:28  --------------------------------------------------------  IN: 320 mL / OUT: 0 mL / NET: 320 mL        Appearance: Normal	  Cardiovascular: Normal S1 S2,RRR, No JVD, No murmurs  Respiratory: Lungs clear to auscultation	  Gastrointestinal:  Soft, Non-tender, + BS	  Extremities: Normal range of motion, No clubbing, cyanosis or edema      Home Medications:  acetaminophen 325 mg oral tablet: 2 tab(s) orally every 6 hours, As needed, Temp greater or equal to 38C (100.4F), Mild Pain (1 - 3) (01 Aug 2022 13:52)  amLODIPine 5 mg oral tablet: 1 tab(s) orally once a day (01 Aug 2022 13:52)  atorvastatin 40 mg oral tablet: 1 tab(s) orally once a day (at bedtime) (01 Aug 2022 13:52)  bisacodyl 5 mg oral delayed release tablet: 1 tab(s) orally once a day, As needed, Constipation (01 Aug 2022 13:52)  dexamethasone 2 mg oral tablet: 1 tab po every 8 hours for 2 days, then 1 tab po q12 hours x2 days, then 1 tab po daily x3 days, then stop (01 Aug 2022 13:52)  divalproex sodium 500 mg oral delayed release tablet: 1 tab(s) orally every 8 hours (01 Aug 2022 13:52)  enoxaparin: 40 milligram(s) subcutaneous once a day (at bedtime) (01 Aug 2022 13:52)  gabapentin 100 mg oral capsule: 2 cap(s) orally once a day (at bedtime) (01 Aug 2022 13:52)  insulin glargine 100 units/mL subcutaneous solution: 15 unit(s) subcutaneous once a day (at bedtime) (01 Aug 2022 13:52)  insulin lispro 100 units/mL injectable solution: 5 unit(s) subcutaneous once a day before lunch (01 Aug 2022 13:52)  insulin lispro 100 units/mL injectable solution: 7 unit(s) subcutaneous once a day before breakfast (01 Aug 2022 13:52)  insulin lispro 100 units/mL injectable solution: 7 unit(s) subcutaneous once a day before dinner (01 Aug 2022 13:52)  insulin lispro 100 units/mL injectable solution: moderate dose sliding scale as per hospital/facility protocol, 3 times a day (before meals) (01 Aug 2022 13:52)  losartan 100 mg oral tablet: 1 tab(s) orally once a day (01 Aug 2022 13:52)  melatonin 5 mg oral tablet: 1 tab(s) orally once a day (at bedtime), As needed, Sleep (01 Aug 2022 13:52)  metoprolol tartrate 25 mg oral tablet: 1 tab(s) orally 2 times a day (01 Aug 2022 13:52)  oxyCODONE 10 mg oral tablet: 1 tab(s) orally every 4 hours, As needed, Severe Pain (7 - 10) (01 Aug 2022 13:52)  oxyCODONE 5 mg oral tablet: 1 tab(s) orally every 4 hours, As needed, Moderate Pain (4 - 6) (01 Aug 2022 13:52)  pantoprazole 40 mg oral delayed release tablet: 1 tab(s) orally once a day (before a meal) (01 Aug 2022 13:52)  polyethylene glycol 3350 oral powder for reconstitution: 17 gram(s) orally 2 times a day (01 Aug 2022 13:52)  senna leaf extract oral tablet: 2 tab(s) orally once a day (at bedtime) (01 Aug 2022 13:52)      MEDICATIONS  (STANDING):  aspirin  chewable 81 milliGRAM(s) Oral daily  atorvastatin 80 milliGRAM(s) Oral at bedtime  BACItracin   Ointment 1 Application(s) Topical two times a day  brivaracetam  IVPB 100 milliGRAM(s) IV Intermittent two times a day  dexAMETHasone  Injectable 1 milliGRAM(s) IV Push every 12 hours  dextrose 50% Injectable 25 Gram(s) IV Push once  dextrose 50% Injectable 12.5 Gram(s) IV Push once  dextrose 50% Injectable 25 Gram(s) IV Push once  dextrose Oral Gel 15 Gram(s) Oral once  gabapentin 200 milliGRAM(s) Oral at bedtime  glucagon  Injectable 1 milliGRAM(s) IntraMuscular once  heparin  Infusion 750 Unit(s)/Hr (7.5 mL/Hr) IV Continuous <Continuous>  insulin lispro (ADMELOG) corrective regimen sliding scale   SubCutaneous every 6 hours  insulin NPH human recombinant 17 Unit(s) SubCutaneous every 8 hours  losartan 100 milliGRAM(s) Oral daily  metoprolol tartrate 25 milliGRAM(s) Oral two times a day  pantoprazole  Injectable 40 milliGRAM(s) IV Push every 24 hours  psyllium Powder 1 Packet(s) Enteral Tube every 12 hours  senna 2 Tablet(s) Oral at bedtime      TELEMETRY: nsr 	    ECG:  	  RADIOLOGY:   DIAGNOSTIC TESTING:  [ ] Echocardiogram:  [ ]  Catheterization:  [ ] Stress Test:    OTHER: 	    LABS:	 	    Troponin T, High Sensitivity Result: 3489 ng/L [0 - 51] (08-07 @ 20:41)  Troponin T, High Sensitivity Result: 1633 ng/L [0 - 51] (08-04 @ 21:53)  Troponin T, High Sensitivity Result: 1535 ng/L [0 - 51] (08-04 @ 15:08)  Troponin T, High Sensitivity Result: 1971 ng/L [0 - 51] (08-04 @ 04:20)  Creatine Kinase, Serum: 265 U/L [25 - 170] (08-04 @ 04:20)  CKMB Units: 18.9 ng/mL [0.0 - 3.8] (08-04 @ 04:20)  Creatine Kinase, Serum: 326 U/L [25 - 170] (08-03 @ 08:57)  CKMB Units: 29.3 ng/mL [0.0 - 3.8] (08-03 @ 08:57)  Troponin T, High Sensitivity Result: 1121 ng/L [0 - 51] (08-03 @ 08:57)  Creatine Kinase, Serum: 355 U/L [25 - 170] (08-03 @ 07:45)  CKMB Units: 32.0 ng/mL [0.0 - 3.8] (08-03 @ 07:45)  Troponin T, High Sensitivity Result: 1120 ng/L [0 - 51] (08-03 @ 07:45)  CKMB Units: 63.2 ng/mL [0.0 - 3.8] (08-02 @ 17:12)  Creatine Kinase, Serum: 466 U/L [25 - 170] (08-02 @ 17:12)  Troponin T, High Sensitivity Result: 948 ng/L [0 - 51] (08-02 @ 17:12)  Creatine Kinase, Serum: 470 U/L [25 - 170] (08-02 @ 11:09)  CKMB Units: 72.7 ng/mL [0.0 - 3.8] (08-02 @ 11:09)  Troponin T, High Sensitivity Result: 961 ng/L [0 - 51] (08-02 @ 11:09)  CKMB Units: 57.1 ng/mL [0.0 - 3.8] (08-02 @ 04:07)  Creatine Kinase, Serum: 386 U/L [25 - 170] (08-02 @ 04:07)  Troponin T, High Sensitivity Result: 622 ng/L [0 - 51] (08-02 @ 04:07)  CKMB Units: 19.7 ng/mL [0.0 - 3.8] (08-01 @ 20:29)  Creatine Kinase, Serum: 171 U/L [25 - 170] (08-01 @ 20:29)  Troponin T, High Sensitivity Result: 285 ng/L [0 - 51] (08-01 @ 20:29)  Troponin T, High Sensitivity Result: 22 ng/L [0 - 51] (08-01 @ 15:26)  CKMB Units: 2.0 ng/mL [0.0 - 3.8] (08-01 @ 15:26)  Creatine Kinase, Serum: 78 U/L [25 - 170] (08-01 @ 15:26)                          10.3   12.58 )-----------( 240      ( 08 Aug 2022 05:19 )             32.0     08-08    137  |  102  |  49<H>  ----------------------------<  267<H>  5.8<H>   |  27  |  1.09    Ca    9.0      08 Aug 2022 05:19    TPro  5.4<L>  /  Alb  2.6<L>  /  TBili  0.2  /  DBili  x   /  AST  22  /  ALT  26  /  AlkPhos  63  08-07    PTT - ( 08 Aug 2022 05:19 )  PTT:67.4 sec

## 2022-08-08 NOTE — PROGRESS NOTE ADULT - ASSESSMENT
Patient is an 82 year old female that presented to ED with abnormal MRI reading (discovered by patient's orthopedic), also with gait/balance issues since April, found to have R temporal brain mass c/f metastatic brain lesion now s/p craniotomy 7/27. While pending surgery, AC held, and on 8/1 presented with CP found to have STEMI on EKG. ASA loaded and started on hep gtt 8/1 and transferred to CCU for further management.    ====================  NEUROLOGY    #AMS  - Repeat CTH x5 unchanged from prior  - UA+, completed zosyn     #Brain lesion  - s/p craniotomy for resection of brain mass 7/27, path showing NSC carcinoma (favor adeno)  - c/w depakote  - c/w dexamethasone  - repeat CTH 1 hour post AC resumption unremarkable  - goal -160     #Peripheral neuropathy  - c/w gabapentin    ====================  CARDIOVASCULAR    #CAD s/p PCI  #STEMI  - TTE 7/22 with normal LV internal dimensions with discrete upper septal hypertrophy. EF 55%, inferolateral wall is akinetic.  - c/w lopressor 25 bid  - c/w atorva 40  - EKG 8/1 with STEMI in multiple leads, r/p EKG more pronounced STEMI   - too high risk for cath given recent craniotomy  - s/p ASA load, starting heparin gtt      PE  -  on iv heparin  - switch to eliquis    #HTN  - consider decreasing  losartan to 50 as pt is hypotensive    ====================  RESPIRATORY    #Metastatic lung cancer  - s/p bronchoscopy - 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  - no indication for pulmonary medications or antibiotics  - Pulm following, appreciate recs    - Pt tachypneic, borderline HoTN 8/2 c/f PE, CTA chest obtained to r/o PE  - iv heparin for PE  - satting well on RA  - incentive mario    ====================  GI    dysphagia  - passed BMS  - pantoprazole 40 qD  - senna, dulcolax    ====================  ENDOCRINE    #DM  - NPH 6   - moderate dose correctional scale  - Endo following, appreciate recs     ====================  RENAL    - Monitor UOP, lytes    ====================  HEMATOLOGY/ONCOLOGY    #Leukocytosis  - is on dexamethasone,     #Metastatic lung cancer  - Onc following, recs appreciated  - Path +NSCC    #DVT PPx  - c/w ASA, heparin gtt    ====================  INFECTIOUS DISEASES    - completed abx for poss uti x 5 days

## 2022-08-08 NOTE — PROGRESS NOTE ADULT - ASSESSMENT
Assessment  DMT2: 82y Female with DM T2 with hyperglycemia, A1C 8.4%, was on oral meds and insulin at home, on NPH insulin and coverage, blood sugars running slightly high/improving this afternoon, no hypoglycemic episodes. NAD, on dexamethasone, diet advanced, diet orders noted to be nondiabetic.  Brain Mass: on meds, monitored, FU Neurosurgery.  Lung Mass: for bronchoscopy, monitored.  Thyroid Nodules: seen on imaging, B/L subcentimeter thyroid nodules, she denies neck mass/pain/dysphagia, euthyroid, reports known history and is being followed by Dr. Richardson.  Adrenal Nodule: seen on imaging, 1.2 cm left thyroid nodule, unknown history.        Troy Thibodeaux MD  [phone]               Assessment  DMT2: 82y Female with DM T2 with hyperglycemia, A1C 8.4%, was on oral meds and insulin at home, on NPH insulin and coverage, blood sugars running slightly high/improving this afternoon, no  hypoglycemic episodes. NAD, on dexamethasone, diet advanced, diet orders noted to be nondiabetic.  Brain Mass: on meds, monitored, FU Neurosurgery.  Lung Mass: for bronchoscopy, monitored.  Thyroid Nodules: seen on imaging, B/L subcentimeter thyroid nodules, she denies neck mass/pain/dysphagia, euthyroid, reports known history and is being followed by Dr. Richardson.  Adrenal Nodule: seen on imaging, 1.2 cm left thyroid nodule, unknown history.        Troy Thibodeaux MD  [phone]

## 2022-08-08 NOTE — CONSULT NOTE ADULT - CONSULT REQUESTED DATE/TIME
04-Aug-2022 16:12
08-Aug-2022 16:19
19-Jul-2022
21-Jul-2022 14:30
19-Jul-2022 11:08
18-Jul-2022 23:41
21-Jul-2022 17:30
29-Jul-2022 12:22
19-Jul-2022 14:37
20-Jul-2022 15:58

## 2022-08-08 NOTE — SWALLOW VFSS/MBS ASSESSMENT ADULT - DIAGNOSTIC IMPRESSIONS
Pt is 83 y/o F admitted for abnormal imaging found to have R temporal brain mass c/f metastatic brain lesion now s/p craniotomy 7/27. Course c/b STEMI. Now presenting with 1. Grossly functional swallow sequence to tolerate regular texture diet. One episode of laryngeal penetration with thin liquids was noted (however penetration is considered to be a normal variant). 2. Cognitive linguistic impairments.

## 2022-08-08 NOTE — PROGRESS NOTE ADULT - SUBJECTIVE AND OBJECTIVE BOX
Chief complaint  Patient is a 82y old  Female who presents with a chief complaint of abnormal imaging (08 Aug 2022 13:28)   Review of systems  Patient in bed, looks comfortable, no hypoglycemic episodes.    Labs and Fingersticks  CAPILLARY BLOOD GLUCOSE      POCT Blood Glucose.: 219 mg/dL (08 Aug 2022 12:51)  POCT Blood Glucose.: 206 mg/dL (08 Aug 2022 11:23)  POCT Blood Glucose.: 258 mg/dL (08 Aug 2022 05:15)  POCT Blood Glucose.: 217 mg/dL (07 Aug 2022 21:14)  POCT Blood Glucose.: 150 mg/dL (07 Aug 2022 17:25)      Anion Gap, Serum: 9 (08-08 @ 13:55)  Anion Gap, Serum: 8 (08-08 @ 05:19)  Anion Gap, Serum: 8 (08-07 @ 22:20)  Anion Gap, Serum: 10 (08-07 @ 20:41)      Calcium, Total Serum: 9.2 (08-08 @ 13:55)  Calcium, Total Serum: 9.0 (08-08 @ 05:19)  Calcium, Total Serum: 8.9 (08-07 @ 22:20)  Calcium, Total Serum: 9.1 (08-07 @ 20:41)  Albumin, Serum: 2.6 *L* (08-07 @ 22:20)    Alanine Aminotransferase (ALT/SGPT): 26 (08-07 @ 22:20)  Alkaline Phosphatase, Serum: 63 (08-07 @ 22:20)  Aspartate Aminotransferase (AST/SGOT): 22 (08-07 @ 22:20)        08-08    138  |  102  |  50<H>  ----------------------------<  157<H>  5.0   |  27  |  0.98    Ca    9.2      08 Aug 2022 13:55    TPro  5.4<L>  /  Alb  2.6<L>  /  TBili  0.2  /  DBili  x   /  AST  22  /  ALT  26  /  AlkPhos  63  08-07                        10.3   12.58 )-----------( 240      ( 08 Aug 2022 05:19 )             32.0     Medications  MEDICATIONS  (STANDING):  aspirin  chewable 81 milliGRAM(s) Oral daily  atorvastatin 80 milliGRAM(s) Oral at bedtime  BACItracin   Ointment 1 Application(s) Topical two times a day  brivaracetam  IVPB 100 milliGRAM(s) IV Intermittent two times a day  dexAMETHasone  Injectable 1 milliGRAM(s) IV Push every 12 hours  dextrose 50% Injectable 25 Gram(s) IV Push once  dextrose 50% Injectable 12.5 Gram(s) IV Push once  dextrose 50% Injectable 25 Gram(s) IV Push once  dextrose Oral Gel 15 Gram(s) Oral once  gabapentin 200 milliGRAM(s) Oral at bedtime  glucagon  Injectable 1 milliGRAM(s) IntraMuscular once  heparin  Infusion 750 Unit(s)/Hr (7.5 mL/Hr) IV Continuous <Continuous>  insulin lispro (ADMELOG) corrective regimen sliding scale   SubCutaneous every 6 hours  insulin NPH human recombinant 17 Unit(s) SubCutaneous every 8 hours  losartan 100 milliGRAM(s) Oral daily  metoprolol tartrate 25 milliGRAM(s) Oral two times a day  pantoprazole  Injectable 40 milliGRAM(s) IV Push every 24 hours  psyllium Powder 1 Packet(s) Enteral Tube every 12 hours  senna 2 Tablet(s) Oral at bedtime      Physical Exam  General: Patient comfortable in bed  Vital Signs Last 12 Hrs  T(F): 98.9 (08-08-22 @ 13:14), Max: 98.9 (08-08-22 @ 13:14)  HR: 72 (08-08-22 @ 13:14) (66 - 72)  BP: 104/67 (08-08-22 @ 13:14) (104/67 - 124/72)  BP(mean): --  RR: 18 (08-08-22 @ 13:14) (18 - 18)  SpO2: 96% (08-08-22 @ 13:14) (94% - 96%)      Diagnostics    Metanephrine, Plasma: AM Sched. Collection: 21-Jul-2022 06:00 (07-20 @ 10:54)  Aldosterone, Serum: AM Sched. Collection: 21-Jul-2022 06:00 (07-20 @ 10:54)  Cortisol AM, Serum: AM Sched. Collection: 21-Jul-2022 06:00 (07-20 @ 10:54)           Chief complaint  Patient is a 82y old  Female who presents with a chief complaint of abnormal imaging (08 Aug 2022 13:28)   Review of systems  Patient in bed, looks comfortable, no hypoglycemic episodes.    Labs and Fingersticks  CAPILLARY BLOOD GLUCOSE      POCT Blood Glucose.: 219 mg/dL (08 Aug 2022 12:51)  POCT Blood Glucose.: 206 mg/dL (08 Aug 2022 11:23)  POCT Blood Glucose.: 258 mg/dL (08 Aug 2022 05:15)  POCT Blood Glucose.: 217 mg/dL (07 Aug 2022 21:14)  POCT Blood Glucose.: 150 mg/dL (07 Aug 2022 17:25)      Anion Gap, Serum: 9 (08-08 @ 13:55)  Anion Gap, Serum: 8 (08-08 @ 05:19)  Anion Gap, Serum: 8 (08-07 @ 22:20)  Anion Gap, Serum: 10 (08-07 @ 20:41)      Calcium, Total Serum: 9.2 (08-08 @ 13:55)  Calcium, Total Serum: 9.0 (08-08 @ 05:19)  Calcium, Total Serum: 8.9 (08-07 @ 22:20)  Calcium, Total Serum: 9.1 (08-07 @ 20:41)  Albumin, Serum: 2.6 *L* (08-07 @ 22:20)    Alanine Aminotransferase (ALT/SGPT): 26 (08-07 @ 22:20)  Alkaline Phosphatase, Serum: 63 (08-07 @ 22:20)  Aspartate Aminotransferase (AST/SGOT): 22 (08-07 @ 22:20)        08-08    138  |  102  |  50<H>  ----------------------------<  157<H>  5.0   |  27  |  0.98    Ca    9.2      08 Aug 2022 13:55    TPro  5.4<L>  /  Alb  2.6<L>  /  TBili  0.2  /  DBili  x   /  AST  22  /  ALT  26  /  AlkPhos  63  08-07                        10.3   12.58 )-----------( 240      ( 08 Aug 2022 05:19 )             32.0     Medications  MEDICATIONS  (STANDING):  aspirin  chewable 81 milliGRAM(s) Oral daily  atorvastatin 80 milliGRAM(s) Oral at bedtime  BACItracin   Ointment 1 Application(s) Topical two times a day  brivaracetam  IVPB 100 milliGRAM(s) IV Intermittent two times a day  dexAMETHasone  Injectable 1 milliGRAM(s) IV Push every 12 hours  dextrose 50% Injectable 25 Gram(s) IV Push once  dextrose 50% Injectable 12.5 Gram(s) IV Push once  dextrose 50% Injectable 25 Gram(s) IV Push once  dextrose Oral Gel 15 Gram(s) Oral once  gabapentin 200 milliGRAM(s) Oral at bedtime  glucagon  Injectable 1 milliGRAM(s) IntraMuscular once  heparin  Infusion 750 Unit(s)/Hr (7.5 mL/Hr) IV Continuous <Continuous>  insulin lispro (ADMELOG) corrective regimen sliding scale   SubCutaneous every 6 hours  insulin NPH human recombinant 17 Unit(s) SubCutaneous every 8 hours  losartan 100 milliGRAM(s) Oral daily  metoprolol tartrate 25 milliGRAM(s) Oral two times a day  pantoprazole  Injectable 40 milliGRAM(s) IV Push every 24 hours  psyllium Powder 1 Packet(s) Enteral Tube every 12 hours  senna 2 Tablet(s) Oral at bedtime      Physical Exam  General: Patient comfortable in bed  Vital Signs Last 12 Hrs  T(F): 98.9 (08-08-22 @ 13:14), Max: 98.9 (08-08-22 @ 13:14)  HR: 72 (08-08-22 @ 13:14) (66 - 72)  BP: 104/67 (08-08-22 @ 13:14) (104/67 - 124/72)  BP(mean): --  RR: 18 (08-08-22 @ 13:14) (18 - 18)  SpO2: 96% (08-08-22 @ 13:14) (94% - 96%)      Diagnostics    Metanephrine, Plasma: AM Sched. Collection: 21-Jul-2022 06:00 (07-20 @ 10:54)  Aldosterone, Serum: AM Sched. Collection: 21-Jul-2022 06:00 (07-20 @ 10:54)  Cortisol AM, Serum: AM Sched. Collection: 21-Jul-2022 06:00 (07-20 @ 10:54)

## 2022-08-08 NOTE — PROGRESS NOTE ADULT - SUBJECTIVE AND OBJECTIVE BOX
CC: f/u for possible septic event    Patient reports: she offers no complaints, aware of condition    REVIEW OF SYSTEMS:  All other review of systems negative (Comprehensive ROS)    Antimicrobials Day #  :s/p 5 days of zosyn    Other Medications Reviewed  MEDICATIONS  (STANDING):  aspirin  chewable 81 milliGRAM(s) Oral daily  atorvastatin 80 milliGRAM(s) Oral at bedtime  BACItracin   Ointment 1 Application(s) Topical two times a day  brivaracetam  IVPB 100 milliGRAM(s) IV Intermittent two times a day  dexAMETHasone  Injectable 1 milliGRAM(s) IV Push every 12 hours  dextrose 50% Injectable 25 Gram(s) IV Push once  dextrose 50% Injectable 12.5 Gram(s) IV Push once  dextrose 50% Injectable 25 Gram(s) IV Push once  dextrose Oral Gel 15 Gram(s) Oral once  gabapentin 200 milliGRAM(s) Oral at bedtime  glucagon  Injectable 1 milliGRAM(s) IntraMuscular once  heparin  Infusion 750 Unit(s)/Hr (7.5 mL/Hr) IV Continuous <Continuous>  insulin lispro (ADMELOG) corrective regimen sliding scale   SubCutaneous every 6 hours  insulin NPH human recombinant 16 Unit(s) SubCutaneous every 8 hours  losartan 100 milliGRAM(s) Oral daily  metoprolol tartrate 25 milliGRAM(s) Oral two times a day  pantoprazole  Injectable 40 milliGRAM(s) IV Push every 24 hours  psyllium Powder 1 Packet(s) Enteral Tube every 12 hours  senna 2 Tablet(s) Oral at bedtime    T(F): 98.4 (08-08-22 @ 09:08), Max: 98.5 (08-07-22 @ 14:52)  HR: 72 (08-08-22 @ 09:08)  BP: 109/64 (08-08-22 @ 09:08)  RR: 18 (08-08-22 @ 09:08)  SpO2: 94% (08-08-22 @ 09:08)  Wt(kg): --    PHYSICAL EXAM:  General: alert, no acute distress, craniotomy incision is c/d/i  Eyes:  anicteric, no conjunctival injection, no discharge  Oropharynx: no lesions or injection 	  Neck: supple, without adenopathy  Lungs: clear to auscultation  Heart: irregular rate and rhythm;   Abdomen: soft, nondistended, nontender, without mass or organomegaly  Skin: no lesions  Extremities: no clubbing, cyanosis, or edema  Neurologic: alert, oriented, moves all extremities    LAB RESULTS:                        10.3   12.58 )-----------( 240      ( 08 Aug 2022 05:19 )             32.0     08-08    137  |  102  |  49<H>  ----------------------------<  267<H>  5.8<H>   |  27  |  1.09    Ca    9.0      08 Aug 2022 05:19    TPro  5.4<L>  /  Alb  2.6<L>  /  TBili  0.2  /  DBili  x   /  AST  22  /  ALT  26  /  AlkPhos  63  08-07    LIVER FUNCTIONS - ( 07 Aug 2022 22:20 )  Alb: 2.6 g/dL / Pro: 5.4 g/dL / ALK PHOS: 63 U/L / ALT: 26 U/L / AST: 22 U/L / GGT: x             MICROBIOLOGY:  RECENT CULTURES:  08-03 @ 13:05 Catheterized Catheterized Escherichia coli  Klebsiella pneumoniae    >100,000 CFU/ml Escherichia coli  >100,000 CFU/ml Klebsiella pneumoniae      08-03 @ 10:59 .Blood Blood     No growth to date.      08-03 @ 10:58 .Blood Blood     No growth to date.          RADIOLOGY REVIEWED:    < from: [facility] Duplex Lower Ext Vein Scan, Bilat (08.07.22 @ 12:59) >  IMPRESSION:  No evidence of deep venous thrombosis in either lower extremity.    < end of copied text >  < from: US Abdomen Complete (US Abdomen Complete .) (08.04.22 @ 15:00) >  IMPRESSION:    Mildly distended gallbladder. No evidence of acute cholecystitis.    Possible mild hydronephrosis.    --- End of Report ---    < end of copied text >

## 2022-08-08 NOTE — PROGRESS NOTE ADULT - PROBLEM SELECTOR PLAN 1
Will adjust diet orders to be consistent carb.  Will DC NPH and switch to the following basal bolus insulin regimen:  Will start Lantus 24u at bedtime.  Will start Admelog 8u before each meal and continue Admelog correction scale coverage. Will continue monitoring FS and FU.  Suggest DC to rehab on current basal bolus insulin, discontinue prior Glimepiride. FU 4 weeks.    for compliance with consistent low carb diet.

## 2022-08-08 NOTE — PROGRESS NOTE ADULT - SUBJECTIVE AND OBJECTIVE BOX
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; pulmonary embolism; STEMI; lethargy    INTERVAL HISTORY: looks well  on the neurology floor; awake and alert chatting with her family while sitting in the chair - speech is now fluent; no shortness of breath or hypoxemia on room air; no cough, sputum production, hemoptysis, chest congestion or wheeze; no fevers, chills or sweats; no chest pain/pressure or palpitations; s/p bronchoscopy with endobronchial brushings/biopsies of an obstructing left upper lobe endobronchial lesion -> 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; no seizures on EEG; NGT is out and the patient is enjoying a regular diet    REVIEW OF SYSTEMS:  Constitutional: As per interval history  HEENT: Within normal limits  CV: As per interval history  Resp: As per interval history  GI: dysphagia -> resolved   : Within normal limits  Musculoskeletal: Within normal limits  Skin: Within normal limits  Neurological: lethargy -> resovled  Psychiatric: Within normal limits  Endocrine: Within normal limits  Hematologic/Lymphatic: 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  Allergic/Immunologic: Within normal limits    MEDICATIONS:     Pulmonary "    Anti-microbials:    Cardiovascular:  losartan 100 milliGRAM(s) Oral daily  metoprolol tartrate 25 milliGRAM(s) Oral two times a day    Other:  aspirin  chewable 81 milliGRAM(s) Oral daily  atorvastatin 80 milliGRAM(s) Oral at bedtime  BACItracin   Ointment 1 Application(s) Topical two times a day  brivaracetam  IVPB 100 milliGRAM(s) IV Intermittent two times a day  dexAMETHasone  Injectable 1 milliGRAM(s) IV Push every 12 hours  dextrose 50% Injectable 25 Gram(s) IV Push once  dextrose 50% Injectable 12.5 Gram(s) IV Push once  dextrose 50% Injectable 25 Gram(s) IV Push once  dextrose Oral Gel 15 Gram(s) Oral once  gabapentin 200 milliGRAM(s) Oral at bedtime  glucagon  Injectable 1 milliGRAM(s) IntraMuscular once  heparin  Infusion 750 Unit(s)/Hr IV Continuous <Continuous>  insulin lispro (ADMELOG) corrective regimen sliding scale   SubCutaneous every 6 hours  insulin NPH human recombinant 17 Unit(s) SubCutaneous every 8 hours  pantoprazole  Injectable 40 milliGRAM(s) IV Push every 24 hours  psyllium Powder 1 Packet(s) Enteral Tube every 12 hours  senna 2 Tablet(s) Oral at bedtime    MEDICATIONS  (PRN):  melatonin 5 milliGRAM(s) Oral at bedtime PRN Sleep        OBJECTIVE:    POCT Blood Glucose.: 219 mg/dL (08 Aug 2022 12:51)  POCT Blood Glucose.: 206 mg/dL (08 Aug 2022 11:23)  POCT Blood Glucose.: 258 mg/dL (08 Aug 2022 05:15)  POCT Blood Glucose.: 217 mg/dL (07 Aug 2022 21:14)  POCT Blood Glucose.: 150 mg/dL (07 Aug 2022 17:25)      PHYSICAL EXAM:       ICU Vital Signs Last 24 Hrs  T(C): 37.2 (08 Aug 2022 13:14), Max: 37.2 (08 Aug 2022 13:14)  T(F): 98.9 (08 Aug 2022 13:14), Max: 98.9 (08 Aug 2022 13:14)  HR: 72 (08 Aug 2022 13:14) (65 - 72)  BP: 104/67 (08 Aug 2022 13:14) (96/59 - 136/75)  BP(mean): --  ABP: --  ABP(mean): --  RR: 18 (08 Aug 2022 13:14) (18 - 18)  SpO2: 96% (08 Aug 2022 13:14) (94% - 98%) on room air    General: Awake. Alert. Cooperative. No distress. Appears stated age. Sitting in the chair  HEENT:  Right craniotomy incision stapled. Normocephalic. Anicteric. Normal oral mucosa. PERRL. EOMI.  Neck: Supple. Trachea midline. Thyroid without enlargement/tenderness/nodules. No carotid bruit. No JVD.	  Cardiovascular: Regular rate and rhythm. S1 S2 normal. No murmurs, rubs or gallops.  Respiratory: Respirations unlabored. Clear to auscultation and percussion bilaterally. No curvature.  Abdomen: Soft. Non-tender. Non-distended. No organomegaly. No masses. Normal bowel sounds.  Extremities: Warm to touch. No clubbing or cyanosis. No pedal edema.   Pulses: 2+ peripheral pulses all extremities.	  Skin: Craniotomy scar C/D/I. Abrasion with ulceraton on the forehead at the site of the craniotomy dressing  Lymph Nodes: Cervical, supraclavicular and axillary nodes normal  Neurological: Moving all extremities. A and O x 3.  Psychiatry: Appropriate mood and affect.    LABS:                          10.3   12.58 )-----------( 240      ( 08 Aug 2022 05:19 )             32.0     CBC    WBC  12.58 <==, 11.75 <==, 13.06 <==, 12.70 <==, 16.18 <==, 17.41 <==, 20.39 <==    Hemoglobin  10.3 <<==, 10.2 <<==, 10.6 <<==, 10.6 <<==, 14.0 <<==, 12.4 <<==, 11.5 <<==    Hematocrit  32.0 <==, 31.8 <==, 32.9 <==, 33.4 <==, 44.3 <==, 38.9 <==, 34.9 <==    Platelets  240 <==, 223 <==, 248 <==, 228 <==, 229 <==, 219 <==, 182 <==      137  |  102  |  49<H>  ----------------------------<  267<H>    08-08  5.8<H>   |  27  |  1.09      LYTES    sodium  137 <==, 137 <==, 139 <==, 141 <==, 135 <==, 140 <==, 137 <==    potassium   5.8 <==, 5.7 <==, 5.4 <==, 4.5 <==, 4.7 <==, 4.5 <==, 4.3 <==    chloride  102 <==, 103 <==, 104 <==, 103 <==, 99 <==, 103 <==, 100 <==    carbon dioxide  27 <==, 26 <==, 25 <==, 27 <==, 20 <==, 25 <==, 24 <==    =============================================================================================  RENAL FUNCTION:    Creatinine:   1.09  <<==, 1.05  <<==, 1.04  <<==, 1.01  <<==, 1.05  <<==, 1.13  <<==, 1.06  <<==    BUN:   49 <==, 49 <==, 46 <==, 46 <==, 42 <==, 44 <==, 41 <==    ============================================================================================  calcium   9.0 <==, 8.9 <==, 9.1 <==, 8.9 <==, 9.3 <==, 9.0 <==, 8.2 <==    phos   4.0 <==, 4.6 <==, 5.3 <==, 2.7 <==, 3.3 <==, 3.4 <==    mag   2.4 <==, 2.5 <==, 1.8 <==, 2.2 <==, 1.7 <==, 1.6 <==  ============================================================================================  LFTs    AST:   22 <== , 37 <== , 48 <== , 57 <== , 53 <== , 25 <==     ALT:  26  <== , 22  <== , 17  <== , 16  <== , 14  <== , 16  <==     AP:  63  <=, 79  <=, 57  <=, 66  <=, 63  <=, 75  <=    Bili:  0.2  <=, 0.5  <=, 0.4  <=, 0.2  <=, 0.2  <=, 0.2  <=    PT/INR - ( 04 Aug 2022 21:53 )   PT: 11.4 sec;   INR: 0.99 ratio       PTT - ( 08 Aug 2022 05:19 )  PTT:67.4 sec    Serum Pro-Brain Natriuretic Peptide: 4009 pg/mL ( @ 21:53)  Serum Pro-Brain Natriuretic Peptide: 7071 pg/mL ( @ 04:20)  Serum Pro-Brain Natriuretic Peptide: 7944 pg/mL ( @ 07:45)  Serum Pro-Brain Natriuretic Peptide: 7011 pg/mL ( @ 04:07)  Serum Pro-Brain Natriuretic Peptide: 1286 pg/mL ( @ 20:29)    CARDIAC MARKERS ( 07 Aug 2022 20:41 )  CPK x     /CKMB x     /CKMB Units x        troponin 3489 ng/L    CARDIAC MARKERS ( 04 Aug 2022 21:53 )  CPK x     /CKMB x     /CKMB Units x        troponin 1633 ng/L    CARDIAC MARKERS ( 04 Aug 2022 15:08 )  CPK x     /CKMB x     /CKMB Units x        troponin 1535 ng/L    CARDIAC MARKERS ( 04 Aug 2022 04:20 )   U/L /CKMB x     /CKMB Units 18.9 ng/mL    troponin 1971 ng/L    CARDIAC MARKERS ( 03 Aug 2022 08:57 )   U/L /CKMB x     /CKMB Units 29.3 ng/mL    troponin 1121 ng/L    CARDIAC MARKERS ( 03 Aug 2022 07:45 )   U/L /CKMB x     /CKMB Units 32.0 ng/mL    troponin 1120 ng/L    CARDIAC MARKERS ( 02 Aug 2022 17:12 )   U/L /CKMB x     /CKMB Units 63.2 ng/mL    troponin 948 ng/L  CARDIAC MARKERS ( 02 Aug 2022 11:09 )   U/L /CKMB x     /CKMB Units 72.7 ng/mL    troponin 961 ng/L    CARDIAC MARKERS ( 02 Aug 2022 04:07 )   U/L /CKMB x     /CKMB Units 57.1 ng/mL    troponin 622 ng/L    CARDIAC MARKERS ( 01 Aug 2022 20:29 )   U/L /CKMB x     /CKMB Units 19.7 ng/mL    troponin 285 ng/L    CARDIAC MARKERS ( 01 Aug 2022 15:26 )  CPK 78 U/L /CKMB x     /CKMB Units 2.0 ng/mL    troponin 22 ng/L    < from: Limited Transthoracic Echo (w/Cont) (22 @ 08:02) >    Patient name: LANIE BERTRAND  YOB: 1940   Age: 82 (F)   MR#: [id number]  Study Date: 2022  ------------------------------------------------------------------------  Conclusions:  1. Endocardial visualization enhanced with intravenous  injection of Ultrasonic Enhancing Agent (Lumason). Left  ventricle not well visualized despite intravenous  ultrasonic enhancing agent; grossly, preserved left  ventricular systolic function with segmental wall motion  abnormalities. EF approximately 55%. The mid to distal  inferolateral and mid to distal inferior segments are  hypkinetic. The apex is akinetic. No left ventricular  thrombus seen.  *** Compared with echocardiogram of 2022, results are  similar on today's study. The mid to distal inferior and  the apex appear hypo/akinetic on today's study on images  with intravenous ultrasonic enhancing agent.  ------------------------------------------------------------------------  Confirmed on  2022 - 10:57:19 by Kelsie Becker M.D.  ------------------------------------------------------------------------  ------------------------------------------------------------------------  Surgical Pathology Report (22 @ 11:53)   Surgical Pathology Report:   1. Left mainstem bronchus biopsy   Final Diagnosis   Bronchus, mainstem, left, biopsy:   In summary the morphology and immunophenotype are consistent with poorly   differentiated adenocarcinoma of lung origin.   Dr. Hopkins was notified of the diagnosis on 2022.   ------------------------------------------------------------------------------  MICROBIOLOGY:   Urinalysis Basic - ( 03 Aug 2022 08:40 )    Color: Light Yellow / Appearance: Clear / S.015 / pH: x  Gluc: x / Ketone: Trace  / Bili: Negative / Urobili: Negative   Blood: x / Protein: Negative / Nitrite: Negative   Leuk Esterase: Large / RBC: 1 /hpf / WBC 51 /HPF   Sq Epi: x / Non Sq Epi: 0 /hpf / Bacteria: Many    Culture - Urine (22 @ 13:05)   Specimen Source: Catheterized Catheterized   Culture Results:   >100,000 CFU/ml Escherichia coli   >100,000 CFU/ml Klebsiella pneumoniae     Culture - Blood (22 @ 10:59)   Specimen Source: .Blood Blood   Culture Results:   No growth to date.     Culture - Blood (22 @ 10:58)   Specimen Source: .Blood Blood   Culture Results:   No growth to date.     MRSA/MSSA PCR (22 @ 07:20)   MRSA PCR Result.: NotDetec  Staph aureus PCR Result: NotDete     RADIOLOGY:    EXAM:  CT ANGIO CHEST PUL JULIO MCKOY                          PROCEDURE DATE:  2022      FINDINGS:    PULMONARY ANGIOGRAM: Pulmonary embolism in the lateral segmental branch   of the right middle lobe without evidence of right heart strain. Multiple   segmental branches in the left lung are obscured by motion.    LYMPH NODES: Stable 1.2 cm left hilar lymph node.    HEART/VASCULATURE: The heart is normal in size. No pericardial effusion.   There are aortic, aortic valve, and coronary artery calcifications.    AIRWAYS/LUNGS/PLEURA: Patent central airways. Left upper lobe mass   measuring 2.7 x 2.1 cm, series 5 image 50, decreased in size from prior,   and unchanged obstruction left apical posterior bronchus, and associated   segmental atelectasis along the mediastinum. No pleural effusion or   pneumothorax.    UPPER ABDOMEN: Within normal limits.    BONES/SOFT TISSUES: Stable multinodular thyroid. Metallic hardware in the   right humeral head and shaft. Degenerative changes of the spine.    IMPRESSION:  Pulmonary embolism in the lateral segmental branch of the right middle   lobe. No evidence of right heart strain.    Interval decrease in left upper lobe mass. Unchanged obstruction of the   posterior bronchus, and stable fibroglandular lymph node.    COMMUNICATION:  Discussed positive PE findings with [facility] DAVID Poon by Dr. Michelle Rebollar at 12:15 PM on 8/3/2022.    MICHELLE JAUREGUI MD; Resident Radiologist  This document has been electronically signed.  NEHA BAILON M.D., ATTENDINGRADIOGIST  This document has been electronically signed. Aug  3 2022 12:35PM  ---------------------------------------------------------------------------------------------------------------  EXAM:  CT BRAIN                          PROCEDURE DATE:  2022      IMPRESSION:    Redemonstration of right temporal craniotomy for prior resection of a   lesion. There is a large degree of hypoattenuation throughout the right   temporal lobe and extending into the right basal ganglia and right   parietal lobe, suggestive of a combination of vasogenic edema and/or   encephalomalacia/gliosis. This results in secondary mass effect upon the   parenchyma of the right cerebral hemisphere, not significantly changed.   There is mild mass effect on the ventricular system, right greater than   left. There is secondary right to left midline shift of approximately 5.7   mm. Expected postsurgical extra-axial pneumocephalus is reidentified   without significant change. Tiny expected subdural hematoma overlying the   right anterior superior frontal lobe is reidentified without change.    Reidentified is a hypoattenuating lesion within the left medial frontal   lobe measuring 1.4 x 1.6 cm, compatible with known additional metastasis.   No significant surrounding edema.    NERI CERNA MD; Resident Radiologist  This document has been electronically signed.  ALTAF SEBASTIAN MD; Attending Radiologist  This document has been electronically signed. Aug  3 2022 10:23AM  ---------------------------------------------------------------------------------------------------------------  EXAM:  CT ABDOMEN AND PELVIS IC                        EXAM:  CT CHEST IC                          PROCEDURE DATE:  2022      FINDINGS:  CHEST:  LUNGS AND LARGE AIRWAYS: There is a heterogeneous left upper lobe mass   approximately measuring 3.3 x 2.6 cm (2, 28) obstructing the left apical   posterior bronchus with partial atelectasis of the left upper lobe. Few   scattered bilateral pulmonary nodules measuring up to 3 mm in the right   upper lobe (2, 39).  PLEURA: No pleural effusion.  VESSELS: Atherosclerotic changes of the aorta and coronary arteries.  HEART: Heart size is normal. No pericardial effusion.  MEDIASTINUM AND SIXTO: Soft tissue contiguous with mass versus adenopathy   measuring 1.7 x 1.5 cm (2, 33).  CHEST WALL AND LOWER NECK: Bilateral subcentimeter hypoattenuating   thyroid nodules.    ABDOMEN AND PELVIS:  LIVER: Within normal limits.  BILE DUCTS: Normal caliber.  GALLBLADDER: Within normal limits.  SPLEEN: Within normal limits.  PANCREAS: Within normal limits.  ADRENALS: Indeterminant left adrenal nodule measuring 1.2 cm.  KIDNEYS/URETERS: No hydronephrosis. Renal cysts and subcentimeter   hypoattenuating foci bilaterally, too small to characterize.    BLADDER: Within normal limits.  REPRODUCTIVE ORGANS: Uterus and adnexa within normal limits.    BOWEL: Colonic diverticula withoutevidence of acute diverticulitis. No   bowel obstruction. Appendix is normal.  PERITONEUM: No ascites.  VESSELS: Atherosclerotic changes.  RETROPERITONEUM/LYMPH NODES: No lymphadenopathy.  ABDOMINAL WALL: Within normal limits.  BONES: Degenerative changes. Grade 1 anterolisthesis of L5 on S1. Status   post right shoulder arthroplasty.    IMPRESSION:  Left upper lobe lung mass with partial atelectasis of the left upper   lobe, concerning for primary lung neoplasm. Few scattered sub-4 mm   pulmonary nodules, indeterminate.    Indeterminant left adrenal nodule for which contrast enhanced MRI is   recommended for further evaluation.    DEEPAK MORALES MD; Resident Radiologist  This document has been electronically signed.  HAYLEY DENTON MD; Attending Radiologist  This document has been electronically signed. 2022  9:02AM  ---------------------------------------------------------------------------------------------------------------  EXAM:  DUPLEX SCAN EXT VEINS LOWER BI                          PROCEDURE DATE:  2022      IMPRESSION:  No evidence of deep venous thrombosis in either lower extremity.    TISH VIDAL MD; Attending Radiologist  This document has been electronically signed. Aug  7 2022  1:05PM  --------------------------------------------------------------------------------------------------------------  EXAM:  US ABDOMEN COMPLETE                          PROCEDURE DATE:  2022      FINDINGS:  Liver: Increased echogenicity. Smooth contour.  Bile ducts: Normal caliber. Common bile duct measures 3 mm.  Gallbladder: Mildly distended. No stones. No sludge. Negative near the   sonographic Rivera's sign.  Pancreas: Some degree of atrophy. Limited visualization due to bowel gas.  Spleen: 9.9 cm. Within normal limits.  Right kidney: 9.4cm. Possible mild hydronephrosis including a mildly   dilated renal pelvis.  Left kidney: 11.4 cm. No hydronephrosis. Several small cysts lower pole   cyst measuring 1.1 x 1.1 x 0.9 cm at the lower pole.  Ascites: None.  Aorta and IVC: Aorta with atherosclerotic changes. IVC is patent..    IMPRESSION:    Mildly distended gallbladder. No evidence of acute cholecystitis.    Possible mild hydronephrosis.    SOCORRO CARRION MD; Resident Radiologist  This document has been electronically signed.  KUSUM OSBORNE MD; Attending Radiologist  This document has been electronically signed. Aug  4 2022  4:05PM  ---------------------------------------------------------------------------------------------------------------

## 2022-08-08 NOTE — SWALLOW VFSS/MBS ASSESSMENT ADULT - NS SWALLOW VFSS REC ASPIR MON
Monitor for s/s aspiration/laryngeal penetration. If noted:  D/C p.o. intake, provide non-oral nutrition/hydration/meds, and contact this service @ [phone]. Please note: subtle intermittent wet vocal quality noted throughout exam, did not correlate with laryngeal penetration or aspiration./change of breathing pattern/cough/gurgly voice/fever/pneumonia/throat clearing/upper respiratory infection

## 2022-08-08 NOTE — SWALLOW VFSS/MBS ASSESSMENT ADULT - SLP GENERAL OBSERVATIONS
Pt arrived to radiology secured in HENRY chair, able to communicate needs and follow directions for exam. Distractible. + Cognitive linguistic impairments.

## 2022-08-08 NOTE — PROGRESS NOTE ADULT - SUBJECTIVE AND OBJECTIVE BOX
LANIE BERTRAND  MRN-[id number]    Patient is a 82y old  Female who presents with a chief complaint of abnormal imaging (07 Aug 2022 14:53)      Review of System  REVIEW OF SYSTEMS      General:	Denies fatigue, fevers, chills, sweats, decreased appetite.    Skin/Breast: denies pruritis, rash  	  Ophthalmologic: no change in vision or blurring  	  HEENT	Denies dry mouth, oral sores, dysphagia,  change in hearing.    Respiratory and Thorax:  cough, sob, wheeze, hemoptysis  	  Cardiovascular:	no cp , palp, orthopnea    Gastrointestinal:	no n/v/d constipation    Genitourinary:	no dysuria of frequency, no hematuria, no flank pain    Musculoskeletal:	no bone or joint pain. no muscle aches.     Neurological:	no change in sensory or motor function. no headache. no weakness.     Psychiatric:	no depression, no anxiety, insomnia.     Hematology/Lymphatics:	no bleeding or bruising        Current Meds  MEDICATIONS  (STANDING):  aspirin  chewable 81 milliGRAM(s) Oral daily  atorvastatin 80 milliGRAM(s) Oral at bedtime  BACItracin   Ointment 1 Application(s) Topical two times a day  brivaracetam  IVPB 100 milliGRAM(s) IV Intermittent two times a day  dexAMETHasone  Injectable 1 milliGRAM(s) IV Push every 12 hours  dextrose 50% Injectable 25 Gram(s) IV Push once  dextrose 50% Injectable 12.5 Gram(s) IV Push once  dextrose 50% Injectable 25 Gram(s) IV Push once  dextrose Oral Gel 15 Gram(s) Oral once  gabapentin 200 milliGRAM(s) Oral at bedtime  glucagon  Injectable 1 milliGRAM(s) IntraMuscular once  heparin  Infusion 750 Unit(s)/Hr (7.5 mL/Hr) IV Continuous <Continuous>  insulin lispro (ADMELOG) corrective regimen sliding scale   SubCutaneous every 6 hours  insulin NPH human recombinant 16 Unit(s) SubCutaneous every 8 hours  losartan 100 milliGRAM(s) Oral daily  metoprolol tartrate 25 milliGRAM(s) Oral two times a day  pantoprazole  Injectable 40 milliGRAM(s) IV Push every 24 hours  psyllium Powder 1 Packet(s) Enteral Tube every 12 hours  senna 2 Tablet(s) Oral at bedtime    MEDICATIONS  (PRN):  melatonin 5 milliGRAM(s) Oral at bedtime PRN Sleep      Vitals  Vital Signs Last 24 Hrs  T(C): 36.7 (08 Aug 2022 05:00), Max: 36.9 (07 Aug 2022 14:52)  T(F): 98.1 (08 Aug 2022 05:00), Max: 98.5 (07 Aug 2022 14:52)  HR: 66 (08 Aug 2022 05:00) (65 - 69)  BP: 124/72 (08 Aug 2022 05:00) (96/59 - 136/75)  BP(mean): --  RR: 18 (08 Aug 2022 05:00) (18 - 18)  SpO2: 96% (08 Aug 2022 05:00) (96% - 99%)    Parameters below as of 08 Aug 2022 05:00  Patient On (Oxygen Delivery Method): room air        Physical Exam  PHYSICAL EXAM:      Constitutional: NAD    Eyes: PERRLA EOMI, anicteric sclera    Heent :No oral sores, no pharyngeal injection. moist mucosa.    Neck: supple, no jvd, no LAD    Respiratory: CTA b/l     Cardiovascular: s1s2, no m/g/r    Gastrointestinal: soft, nt, nd, + BS    Extremities: no c/c/e    Neurological:A&O x 3 moves all ext.    Skin: no rash on exposed skin    Lymph Nodes: no lymphadenopathy.              Lab  CBC Full  -  ( 08 Aug 2022 05:19 )  WBC Count : 12.58 K/uL  RBC Count : 3.29 M/uL  Hemoglobin : 10.3 g/dL  Hematocrit : 32.0 %  Platelet Count - Automated : 240 K/uL  Mean Cell Volume : 97.3 fl  Mean Cell Hemoglobin : 31.3 pg  Mean Cell Hemoglobin Concentration : 32.2 gm/dL  Auto Neutrophil # : x  Auto Lymphocyte # : x  Auto Monocyte # : x  Auto Eosinophil # : x  Auto Basophil # : x  Auto Neutrophil % : x  Auto Lymphocyte % : x  Auto Monocyte % : x  Auto Eosinophil % : x  Auto Basophil % : x    08-08    137  |  102  |  49<H>  ----------------------------<  267<H>  5.8<H>   |  27  |  1.09    Ca    9.0      08 Aug 2022 05:19    TPro  5.4<L>  /  Alb  2.6<L>  /  TBili  0.2  /  DBili  x   /  AST  22  /  ALT  26  /  AlkPhos  63  08-07    PTT - ( 08 Aug 2022 05:19 )  PTT:67.4 sec    Rad:    Assessment/Plan   LANIE BERTRAND  MRN-[id number]    Patient is a 82y old  Female who presents with a chief complaint of abnormal imaging (07 Aug 2022 14:53)      Review of System    Resting comfortably    Current Meds  MEDICATIONS  (STANDING):  aspirin  chewable 81 milliGRAM(s) Oral daily  atorvastatin 80 milliGRAM(s) Oral at bedtime  BACItracin   Ointment 1 Application(s) Topical two times a day  brivaracetam  IVPB 100 milliGRAM(s) IV Intermittent two times a day  dexAMETHasone  Injectable 1 milliGRAM(s) IV Push every 12 hours  dextrose 50% Injectable 25 Gram(s) IV Push once  dextrose 50% Injectable 12.5 Gram(s) IV Push once  dextrose 50% Injectable 25 Gram(s) IV Push once  dextrose Oral Gel 15 Gram(s) Oral once  gabapentin 200 milliGRAM(s) Oral at bedtime  glucagon  Injectable 1 milliGRAM(s) IntraMuscular once  heparin  Infusion 750 Unit(s)/Hr (7.5 mL/Hr) IV Continuous <Continuous>  insulin lispro (ADMELOG) corrective regimen sliding scale   SubCutaneous every 6 hours  insulin NPH human recombinant 16 Unit(s) SubCutaneous every 8 hours  losartan 100 milliGRAM(s) Oral daily  metoprolol tartrate 25 milliGRAM(s) Oral two times a day  pantoprazole  Injectable 40 milliGRAM(s) IV Push every 24 hours  psyllium Powder 1 Packet(s) Enteral Tube every 12 hours  senna 2 Tablet(s) Oral at bedtime    MEDICATIONS  (PRN):  melatonin 5 milliGRAM(s) Oral at bedtime PRN Sleep    Vital Signs Last 24 Hrs  T(C): 36.7 (08 Aug 2022 05:00), Max: 36.9 (07 Aug 2022 14:52)  T(F): 98.1 (08 Aug 2022 05:00), Max: 98.5 (07 Aug 2022 14:52)  HR: 66 (08 Aug 2022 05:00) (65 - 69)  BP: 124/72 (08 Aug 2022 05:00) (96/59 - 136/75)  BP(mean): --  RR: 18 (08 Aug 2022 05:00) (18 - 18)  SpO2: 96% (08 Aug 2022 05:00) (96% - 99%)    Parameters below as of 08 Aug 2022 05:00  Patient On (Oxygen Delivery Method): room air      PHYSICAL EXAM:     NAD    Lab  CBC Full  -  ( 08 Aug 2022 05:19 )  WBC Count : 12.58 K/uL  RBC Count : 3.29 M/uL  Hemoglobin : 10.3 g/dL  Hematocrit : 32.0 %  Platelet Count - Automated : 240 K/uL  Mean Cell Volume : 97.3 fl  Mean Cell Hemoglobin : 31.3 pg  Mean Cell Hemoglobin Concentration : 32.2 gm/dL  Auto Neutrophil # : x  Auto Lymphocyte # : x  Auto Monocyte # : x  Auto Eosinophil # : x  Auto Basophil # : x  Auto Neutrophil % : x  Auto Lymphocyte % : x  Auto Monocyte % : x  Auto Eosinophil % : x  Auto Basophil % : x    08-08    137  |  102  |  49<H>  ----------------------------<  267<H>  5.8<H>   |  27  |  1.09    Ca    9.0      08 Aug 2022 05:19    TPro  5.4<L>  /  Alb  2.6<L>  /  TBili  0.2  /  DBili  x   /  AST  22  /  ALT  26  /  AlkPhos  63  08-07    PTT - ( 08 Aug 2022 05:19 )  PTT:67.4 sec    Rad:    Assessment/Plan

## 2022-08-08 NOTE — PROGRESS NOTE ADULT - ASSESSMENT
83 y/o female (former smoker) with PMHx of HTN, CAD, HLD, MI, peripheral neuropathy, DM and peripheral artery disease presented to the ED for imaging for her head due to abnormal spine MRI by her orthopedist (Dr. Castro) during workup for her spine for her chronic back problems. The orthopedist noted something in her brain. Otherwise pt has no symptoms, stated that since 04/2022 she has had gait/balance issues. Endorsed that if she sits for a while and gets up, she feels like she is "drunk" and feels off. Denied chest pain, SOB, fevers, chills, numbness/paresthesias, muscular weakness, dizziness, abdominal pain, headaches.   MR head significant for R temporal lobe lesion with edema, midline shift and uncal herniation concerning for metastatic disease. 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      Hospital course: R crani for metastatic lesion 7/27 consistent w metastatic non small cell lung cancer, residual lesion on L side of brain. Course complicated on the hospital floor by inferolateral STEMI 8/1, pt transferred to CICU and started on hep gtt no bolus and ASA, no cath given high risk from comorbidities and recent surgery.       NSCU consulted for encephalopathy in s/o likely UTI/early urosepsis, rule out seizures    NEURO:  - no seizure on EEG  - neuro checks q 4h   -mental status significantly improved  - on decadron 1q12  - on brivaracetam for seizure      PULM: RA  - s/p bronchoscopy with endobronchial brushings/biopsies of an obstructing left upper lobe endobronchial lesion -> 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; No O2 requirements, pulmonary on board  - Incentive spirometry, mobilize as tolerated  - small incidental PE- on hep gtt  - Pulmonary following    CV:  - last night episode of PACs lasting 1.5 sec, remained stable, no c/o chest pain or palpitations, troponin 3489, K 5.7 and given lokelma, metoprolol increased to 25 mg bid  - monitor K-  -Cardiology following  - EF 55%  - STEMI being treated medically, cont hep gtt, PTT  goal 60-80,  Hep @ 7.5,  monitor ptt,   - cont ASA 81  - Pulm recs  switch to a DOAC when safe  - cardiology following  - cont losartan  - cont lopressor ( increased to 25 mg bid) after episode of PAC overnight  - on statin  - new onset afib- rate controlled  - Echo-nml LVF with discrete upper septal hypertrophy, EF 55%, inferolateral wall is akinetic    RENAL:   - borderline PERCY w high BUN, trend BUN  - cont straight caths, bladder [id number]      GI:   - MBS today- did well and started on reg diet w/thin liquids  - GI prophylaxis x PPI while on steroids  - Bowel regimen standing    ENDO:   - Endo following  - outpt FU for adrenal and thyroid abnormalities  - continue fingersticks with NPH 16 u q6  - Goal euglycemia (-180)    HEME/ONC:   - hep gtt, PTT at goal   - small RML PE on CTA, no LV thrombus  - venodynes    ID:   - leukocytosis and lethargy- completed 5 day course of zosyn- being monitored off antibiotics  - leukocytosis may simply be reactive to PE/STEMI/Surgery/Cancer?steroids  - blood cultures remain neg  - ID following  - MRSA swab neg         PT/OT:   - Acute rehab      Spectralink # [id number]   81 y/o female (former smoker) with PMHx of HTN, CAD, HLD, MI, peripheral neuropathy, DM and peripheral artery disease presented to the ED for imaging for her head due to abnormal spine MRI by her orthopedist (Dr. Castro) during workup for her spine for her chronic back problems. The orthopedist noted something in her brain. Otherwise pt has no symptoms, stated that since 04/2022 she has had gait/balance issues. Endorsed that if she sits for a while and gets up, she feels like she is "drunk" and feels off. Denied chest pain, SOB, fevers, chills, numbness/paresthesias, muscular weakness, dizziness, abdominal pain, headaches.   MR head significant for R temporal lobe lesion with edema, midline shift and uncal herniation concerning for metastatic disease. 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      Hospital course: R crani for metastatic lesion 7/27 consistent w metastatic non small cell lung cancer, residual lesion on L side of brain. Course complicated on the hospital floor by inferolateral STEMI 8/1, pt transferred to CICU and started on hep gtt no bolus and ASA, no cath given high risk from comorbidities and recent surgery.       NSCU consulted for encephalopathy in s/o likely UTI/early urosepsis, rule out seizures    NEURO:  - no seizure on EEG  - neuro checks q 4h   -mental status significantly improved  - on decadron 1q12  - on brivaracetam for seizure      PULM: RA  - s/p bronchoscopy with endobronchial brushings/biopsies of an obstructing left upper lobe endobronchial lesion -> 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; No O2 requirements, pulmonary on board  - Incentive spirometry, mobilize as tolerated  - small incidental PE- on hep gtt  - Pulmonary following    CV:  - last night episode of PACs lasting 1.5 sec, remained stable, no c/o chest pain or palpitations, troponin 3489, K 5.7 and given lokelma, metoprolol increased to 25 mg bid  - monitor K-  -Cardiology following  - EF 55%  - STEMI being treated medically, cont hep gtt, PTT  goal 60-80,  Hep @ 7.5,  monitor ptt,   - cont ASA 81  - Pulm recs  switch to a DOAC when safe- ok per Dr Loomis to start DOAC- will f/u with Pulm/Card for recommendations  - cardiology following  - cont losartan  - cont lopressor ( increased to 25 mg bid) after episode of PAC overnight  - on statin  - new onset afib- rate controlled  - Echo-nml LVF with discrete upper septal hypertrophy, EF 55%, inferolateral wall is akinetic    RENAL:   - borderline PERCY w high BUN, trend BUN  - cont straight caths, bladder [id number]      GI:   - MBS today- did well and started on reg diet w/thin liquids  - GI prophylaxis x PPI while on steroids  - Bowel regimen standing    ENDO:   - Endo following  - outpt FU for adrenal and thyroid abnormalities  - continue fingersticks with NPH 16 u q6  - Goal euglycemia (-180)    HEME/ONC:   - hep gtt, PTT at goal   - small RML PE on CTA, no LV thrombus  - venodynes    ID:   - leukocytosis and lethargy- completed 5 day course of zosyn- being monitored off antibiotics  - leukocytosis may simply be reactive to PE/STEMI/Surgery/Cancer?steroids  - blood cultures remain neg  - ID following  - MRSA swab neg         PT/OT:   - Acute rehab      Spectralink # [id number]   81 y/o female (former smoker) with PMHx of HTN, CAD, HLD, MI, peripheral neuropathy, DM and peripheral artery disease presented to the ED for imaging for her head due to abnormal spine MRI by her orthopedist (Dr. Castro) during workup for her spine for her chronic back problems. The orthopedist noted something in her brain. Otherwise pt has no symptoms, stated that since 04/2022 she has had gait/balance issues. Endorsed that if she sits for a while and gets up, she feels like she is "drunk" and feels off. Denied chest pain, SOB, fevers, chills, numbness/paresthesias, muscular weakness, dizziness, abdominal pain, headaches.   MR head significant for R temporal lobe lesion with edema, midline shift and uncal herniation concerning for metastatic disease. 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      Hospital course: R crani for metastatic lesion 7/27 consistent w metastatic non small cell lung cancer, residual lesion on L side of brain. Course complicated on the hospital floor by inferolateral STEMI 8/1, pt transferred to CICU and started on hep gtt no bolus and ASA, no cath given high risk from comorbidities and recent surgery.       NSCU consulted for encephalopathy in s/o likely UTI/early urosepsis, rule out seizures    NEURO:  - no seizure on EEG  - neuro checks q 4h   -mental status significantly improved  - on decadron 1q12  - on brivaracetam for seizure      PULM: RA  - s/p bronchoscopy with endobronchial brushings/biopsies of an obstructing left upper lobe endobronchial lesion -> 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; No O2 requirements, pulmonary on board  - Incentive spirometry, mobilize as tolerated  - small incidental PE- on hep gtt  - Pulmonary following    CV:  - last night episode of PACs lasting 1.5 sec, remained stable, no c/o chest pain or palpitations, troponin 3489, K 5.7 and given lokelma, metoprolol increased to 25 mg bid  - hyperkalemia- K now 5.0 after 2 doses of lokelma- stopped losartan- monitor K-  -Cardiology following  - EF 55%  - STEMI being treated medically, cont hep gtt, PTT  goal 60-80,  Hep @ 7.5,  monitor ptt,   - continue heparin until 8/9 (will be 7 days total) (this will be loading dose) then transition to eliquis 5 mg bid tomorrow  - cont ASA 81  - Pulm recs  switch to a DOAC when safe- ok per Dr Loomis to start DOAC- will f/u with Pulm/Card for recommendations  - cardiology following  - cont losartan  - cont lopressor ( increased to 25 mg bid) after episode of PAC overnight  - on statin  - new onset afib- rate controlled  - Echo-nml LVF with discrete upper septal hypertrophy, EF 55%, inferolateral wall is akinetic    RENAL:   - borderline PERCY w high BUN, trend BUN  - cont straight caths, bladder [id number]      GI:   - MBS today- did well and started on reg diet w/thin liquids  - GI prophylaxis x PPI while on steroids  - Bowel regimen standing    ENDO:   - Endo following  - outpt FU for adrenal and thyroid abnormalities  - continue fingersticks with NPH 16 u q6  - Goal euglycemia (-180)    HEME/ONC:   - hep gtt, PTT at goal   - small RML PE on CTA, no LV thrombus  - venodynes    ID:   - leukocytosis and lethargy- completed 5 day course of zosyn- being monitored off antibiotics  - leukocytosis may simply be reactive to PE/STEMI/Surgery/Cancer?steroids  - blood cultures remain neg  - ID following  - MRSA swab neg         PT/OT:   - Acute rehab      Spectralink # [id number]

## 2022-08-08 NOTE — PROGRESS NOTE ADULT - SUBJECTIVE AND OBJECTIVE BOX
SUBJECTIVE: Pt seen and examined, resting in bed, multiple family members at the bedside. Just returned from [facility] and recommended for a regular diet. NGT is out.    OVERNIGHT EVENTS: Episode of PACs lasting 1.5 sec, remained stable, no c/o chest pain or palpitations, troponin 3489, K 5.7 and given lokelma, metoprolol increased to 25 mg bid    Vital Signs Last 24 Hrs  T(C): 36.9 (08 Aug 2022 09:08), Max: 36.9 (07 Aug 2022 14:52)  T(F): 98.4 (08 Aug 2022 09:08), Max: 98.5 (07 Aug 2022 14:52)  HR: 72 (08 Aug 2022 09:08) (65 - 72)  BP: 109/64 (08 Aug 2022 09:08) (96/59 - 136/75)  BP(mean): --  RR: 18 (08 Aug 2022 09:08) (18 - 18)  SpO2: 94% (08 Aug 2022 09:08) (94% - 98%)    Parameters below as of 08 Aug 2022 09:08  Patient On (Oxygen Delivery Method): room air        PHYSICAL EXAM:    General: No Acute Distress     Neurological: Awake, alert oriented to person, place and time, Following Commands, Face Symmetrical, Speech Fluent, Moving all extremities spont/ag/purposeful, No Drift, Sensation to Light Touch Intact    Pulmonary: Clear to Auscultation, No Rales, No Rhonchi, No Wheezes     Cardiovascular: S1, S2, Regular Rate and Rhythm     Gastrointestinal: Soft, Nontender, Nondistended     Incision: cranial staples c/d/i    LABS:                        10.3   12.58 )-----------( 240      ( 08 Aug 2022 05:19 )             32.0    08-08    137  |  102  |  49<H>  ----------------------------<  267<H>  5.8<H>   |  27  |  1.09    Ca    9.0      08 Aug 2022 05:19    TPro  5.4<L>  /  Alb  2.6<L>  /  TBili  0.2  /  DBili  x   /  AST  22  /  ALT  26  /  AlkPhos  63  08-07  PTT - ( 08 Aug 2022 05:19 )  PTT:67.4 sec      08-07 @ 07:01  -  08-08 @ 07:00  --------------------------------------------------------  IN: 1935 mL / OUT: 1100 mL / NET: 835 mL        MEDICATIONS:  Antibiotics:    Neuro:  brivaracetam  IVPB 100 milliGRAM(s) IV Intermittent two times a day  gabapentin 200 milliGRAM(s) Oral at bedtime  melatonin 5 milliGRAM(s) Oral at bedtime PRN Sleep    Cardiac:  losartan 100 milliGRAM(s) Oral daily  metoprolol tartrate 25 milliGRAM(s) Oral two times a day    Pulm:    GI/:  pantoprazole  Injectable 40 milliGRAM(s) IV Push every 24 hours  psyllium Powder 1 Packet(s) Enteral Tube every 12 hours  senna 2 Tablet(s) Oral at bedtime    Other:   aspirin  chewable 81 milliGRAM(s) Oral daily  atorvastatin 80 milliGRAM(s) Oral at bedtime  BACItracin   Ointment 1 Application(s) Topical two times a day  dexAMETHasone  Injectable 1 milliGRAM(s) IV Push every 12 hours  dextrose 50% Injectable 25 Gram(s) IV Push once  dextrose 50% Injectable 12.5 Gram(s) IV Push once  dextrose 50% Injectable 25 Gram(s) IV Push once  dextrose Oral Gel 15 Gram(s) Oral once  glucagon  Injectable 1 milliGRAM(s) IntraMuscular once  heparin  Infusion 750 Unit(s)/Hr IV Continuous <Continuous>  insulin lispro (ADMELOG) corrective regimen sliding scale   SubCutaneous every 6 hours  insulin NPH human recombinant 16 Unit(s) SubCutaneous every 8 hours    DIET: [] Regular [x] CCD [] Renal [] Puree [] Dysphagia [] Tube Feeds:     IMAGING:   < from: CT Angio Chest PE Protocol w/ IV Cont (08.03.22 @ 09:59) >  IMPRESSION:  Pulmonary embolism in the lateral segmental branch of the right middle   lobe. No evidence of right heart strain.    Interval decrease in left upper lobe mass. Unchanged obstruction of the   posterior bronchus, and stable fibroglandular lymph node.    < end of copied text >  < from: CT Head No Cont (08.03.22 @ 04:38) >    IMPRESSION:    Redemonstration of right temporal craniotomy for prior resection of a   lesion. There is a large degree of hypoattenuation throughout the right   temporal lobe and extending into the right basal ganglia and right   parietal lobe, suggestive of a combination of vasogenic edema and/or   encephalomalacia/gliosis. This results in secondary mass effect upon the   parenchyma of the right cerebral hemisphere, not significantly changed.   There is mild mass effect on the ventricular system, right greater than   left. There is secondary right to left midline shift of approximately 5.7   mm. Expected postsurgical extra-axial pneumocephalus is reidentified   without significant change. Tiny expected subdural hematoma overlying the   right anterior superior frontal lobe is reidentified without change.    Reidentified is a hypoattenuating lesion within the left medial frontal   lobe measuring 1.4 x 1.6 cm, compatible with known additional metastasis.   No significant surrounding edema.    < end of copied text >  < from: MR Head w/wo IV Cont (07.29.22 @ 10:48) >  IMPRESSION: Redemonstration of right-sided craniotomy changes status post   resection of a right temporal lobe mass with expected postoperative   changes. Residual hemorrhage and/or postoperative material within the  postoperative bed limits evaluation for enhancement.    Unchanged appearing ring-enhancing lesion in the left parafalcine   location for which a residual metastatic lesion is a possibility.    --- End of Report ---    < end of copied text >  < from: [facility] Duplex Lower Ext Vein Scan, Bilat (07.28.22 @ 12:35) >    IMPRESSION:  No evidence of deep venous thrombosis in either lower extremi    < end of copied text >  < from: TTE with Doppler (w/Cont) (07.22.22 @ 10:09) >  Conclusions:  Endocardial visualization enhanced with intravenous  injection of Ultrasonic Enhancing Agent (Lumason).  Normal left ventricular internal dimensions with discrete  upper septal hypertrophy.  Estimated ejection fraction 55%, The inferolateral wall is  akinetic.    < end of copied text >

## 2022-08-08 NOTE — CONSULT NOTE ADULT - ASSESSMENT
81 y/o female (former smoker) with PMHx of HTN, CAD, HLD, MI, peripheral neuropathy, DM and peripheral artery disease presented to the ED for imaging for her head due to abnormal spine MRI by her orthopedist (Dr. Castro) during workup for her spine for her chronic back problems. The orthopedist noted something in her brain. Otherwise pt has no symptoms, stated that since 04/2022 she has had gait/balance issues. now   s/p craniotomy for resection of brain mass 7/27, path showing NSC carcinoma (favor adeno) post op STEMI - TTE 7/22 with normal LV internal dimensions with discrete upper septal hypertrophy. EF 55%, inferolateral wall is akinetic.  pt noticed with hyperkalemia.     1- hyperkalemia   2- HTN   3- s/p STEMI   4- NSC carcinoma     etiology of hyperkalemia unclear.   to have renal scan   lokelma 10 g po   low k diet  pt is also very difficult blood draw. may need to have arterial blood gas for k if repeat k is elevated   to have LDH as well

## 2022-08-08 NOTE — PROGRESS NOTE ADULT - ASSESSMENT
[facility] 6/23/21:   s/p successful angioplasty to the mid RCA followed by intracoronary brachytherapy.  [facility] 4/14/21: POBA of mid RCA (80%)   [facility] 3/25/21: PCI/POBA to pCX 90%   Echo 7/22/22: Normal left ventricular internal dimensions with discrete upper septal hypertrophy. Estimated ejection fraction 55%, The inferolateral wall is akinetic.  Echo 8/2/22:  grossly, preserved left ventricular systolic function with segmental wall motion abnormalities. EF approximately 55%. The mid to distal inferolateral and mid to distal inferior segments are hypkinetic. The apex is akinetic. No left ventricular thrombus seen.      a/p  83 y/o female (former smoker) with PMHx of HTN, CAD sp PCi,  HLD, MI, peripheral neuropathy, DM and peripheral artery disease presented with abnl imaging on MRI as outpt      #Brain/ Lung  mass   -MRI brain showed 2 mets R temporal 4.7x3.7x3cm L frontal 1.4x1.5x1.2cm   -s/p bronchoscopy  -s/p Right craniotomy for tumor  -management per neuro-- started on antiplt/ hep as mentioned below- repeat CT head  8/3 noted -neurosx to fu   -speeg fu per neuro      #STEMI   -8/1 co cp-  found to have an inferior STEMI  -per cath lab  attending -Not a PCI candidate given potential benefits would be outweighed by the risks of the burden of anticoagulation and antiplatelet agents required for therapy  due to recent brain surgery  -on asa,  c/w hep gtt   -limited echo grossly, preserved left ventricular systolic function with segmental wall motion abnormalities. EF approximately 55%. The mid to distal inferolateral and mid to distal inferior segments are hypkinetic. The apex is akinetic. No left ventricular thrombus seen.  -c/w statin ,  bb, arb  -monitor ct head - neuro sx following     #acute PE   -c/w hep gtt - change to DOAC per primary team     #new PAFIB   -now in NSR   -c.w BB   -ac as mentioned above     #CAD sp PCI   -antiplt/ac as mentioned above  -echo w inflat akinesis and overall preserved EF  -repeat limited echo as mentioned above   -c/w BB     #HTN  -cont current meds   [facility] 6/23/21:   s/p successful angioplasty to the mid RCA followed by intracoronary brachytherapy.  [facility] 4/14/21: POBA of mid RCA (80%)   [facility] 3/25/21: PCI/POBA to pCX 90%   Echo 7/22/22: Normal left ventricular internal dimensions with discrete upper septal hypertrophy. Estimated ejection fraction 55%, The inferolateral wall is akinetic.  Echo 8/2/22:  grossly, preserved left ventricular systolic function with segmental wall motion abnormalities. EF approximately 55%. The mid to distal inferolateral and mid to distal inferior segments are hypkinetic. The apex is akinetic. No left ventricular thrombus seen.      a/p  81 y/o female (former smoker) with PMHx of HTN, CAD sp PCi,  HLD, MI, peripheral neuropathy, DM and peripheral artery disease presented with abnl imaging on MRI as outpt      #Brain/ Lung  mass   -MRI brain showed 2 mets R temporal 4.7x3.7x3cm L frontal 1.4x1.5x1.2cm   -s/p bronchoscopy  -s/p Right craniotomy for tumor  -management per neuro-- started on antiplt/ hep as mentioned below- repeat CT head  8/3 noted -neurosx to fu   -speeg fu per neuro      #STEMI   -8/1 co cp-  found to have an inferior STEMI  -per cath lab  attending -Not a PCI candidate given potential benefits would be outweighed by the risks of the burden of anticoagulation and antiplatelet agents required for therapy  due to recent brain surgery  -on asa,  c/w hep gtt   -limited echo grossly, preserved left ventricular systolic function with segmental wall motion abnormalities. EF approximately 55%. The mid to distal inferolateral and mid to distal inferior segments are hypkinetic. The apex is akinetic. No left ventricular thrombus seen.  -c/w statin ,  bb,  -hyperkalemia noted-- CAN HOLD ARB for now   -monitor ct head - neuro sx following     #acute PE   -c/w hep gtt - change to DOAC per primary team     #new PAFIB   -now in NSR   -c.w BB   -ac as mentioned above     #CAD sp PCI   -antiplt/ac as mentioned above  -echo w inflat akinesis and overall preserved EF  -repeat limited echo as mentioned above   -c/w BB     #HTN  -cont current meds

## 2022-08-08 NOTE — CONSULT NOTE ADULT - PROVIDER SPECIALTY LIST ADULT
Neurosurgery
Rehab Medicine
Infectious Disease
Nephrology
Neurology
Cardiology
Pulmonology
Rad Onc
Endocrinology
Heme/Onc

## 2022-08-08 NOTE — PROGRESS NOTE ADULT - SUBJECTIVE AND OBJECTIVE BOX
DATE OF SERVICE: 08-08-22 @ 12:03    Patient is a 82y old  Female who presents with a chief complaint of abnormal imaging (08 Aug 2022 09:58)      SUBJECTIVE / OVERNIGHT EVENTS:  No chest pain. No shortness of breath. No complaints. No events overnight.     MEDICATIONS  (STANDING):  aspirin  chewable 81 milliGRAM(s) Oral daily  atorvastatin 80 milliGRAM(s) Oral at bedtime  BACItracin   Ointment 1 Application(s) Topical two times a day  brivaracetam  IVPB 100 milliGRAM(s) IV Intermittent two times a day  dexAMETHasone  Injectable 1 milliGRAM(s) IV Push every 12 hours  dextrose 50% Injectable 25 Gram(s) IV Push once  dextrose 50% Injectable 12.5 Gram(s) IV Push once  dextrose 50% Injectable 25 Gram(s) IV Push once  dextrose Oral Gel 15 Gram(s) Oral once  gabapentin 200 milliGRAM(s) Oral at bedtime  glucagon  Injectable 1 milliGRAM(s) IntraMuscular once  heparin  Infusion 750 Unit(s)/Hr (7.5 mL/Hr) IV Continuous <Continuous>  insulin lispro (ADMELOG) corrective regimen sliding scale   SubCutaneous every 6 hours  insulin NPH human recombinant 16 Unit(s) SubCutaneous every 8 hours  losartan 100 milliGRAM(s) Oral daily  metoprolol tartrate 25 milliGRAM(s) Oral two times a day  pantoprazole  Injectable 40 milliGRAM(s) IV Push every 24 hours  psyllium Powder 1 Packet(s) Enteral Tube every 12 hours  senna 2 Tablet(s) Oral at bedtime    MEDICATIONS  (PRN):  melatonin 5 milliGRAM(s) Oral at bedtime PRN Sleep      Vital Signs Last 24 Hrs  T(C): 36.9 (08 Aug 2022 09:08), Max: 36.9 (07 Aug 2022 14:52)  T(F): 98.4 (08 Aug 2022 09:08), Max: 98.5 (07 Aug 2022 14:52)  HR: 72 (08 Aug 2022 09:08) (65 - 72)  BP: 109/64 (08 Aug 2022 09:08) (96/59 - 136/75)  BP(mean): --  RR: 18 (08 Aug 2022 09:08) (18 - 18)  SpO2: 94% (08 Aug 2022 09:08) (94% - 98%)    Parameters below as of 08 Aug 2022 09:08  Patient On (Oxygen Delivery Method): room air      CAPILLARY BLOOD GLUCOSE      POCT Blood Glucose.: 206 mg/dL (08 Aug 2022 11:23)  POCT Blood Glucose.: 258 mg/dL (08 Aug 2022 05:15)  POCT Blood Glucose.: 217 mg/dL (07 Aug 2022 21:14)  POCT Blood Glucose.: 150 mg/dL (07 Aug 2022 17:25)    I&O's Summary    07 Aug 2022 07:01  -  08 Aug 2022 07:00  --------------------------------------------------------  IN: 1935 mL / OUT: 1100 mL / NET: 835 mL        PHYSICAL EXAM:  GENERAL: NAD, well-developed  HEAD:  Atraumatic, Normocephalic  EYES: EOMI, PERRLA, conjunctiva and sclera clear  NECK: Supple, No JVD  CHEST/LUNG: Clear to auscultation bilaterally; No wheeze  HEART: Regular rate and rhythm; No murmurs, rubs, or gallops  ABDOMEN: Soft, Nontender, Nondistended; Bowel sounds present  EXTREMITIES:  2+ Peripheral Pulses, No clubbing, cyanosis, or edema  PSYCH: AAOx3  NEUROLOGY: non-focal  SKIN: No rashes or lesions    LABS:                        10.3   12.58 )-----------( 240      ( 08 Aug 2022 05:19 )             32.0     08-08    137  |  102  |  49<H>  ----------------------------<  267<H>  5.8<H>   |  27  |  1.09    Ca    9.0      08 Aug 2022 05:19    TPro  5.4<L>  /  Alb  2.6<L>  /  TBili  0.2  /  DBili  x   /  AST  22  /  ALT  26  /  AlkPhos  63  08-07    PTT - ( 08 Aug 2022 05:19 )  PTT:67.4 sec          RADIOLOGY & ADDITIONAL TESTS:    Imaging Personally Reviewed:    Consultant(s) Notes Reviewed:      Care Discussed with Consultants/Other Providers:

## 2022-08-08 NOTE — PROGRESS NOTE ADULT - ASSESSMENT
1) Lung and brain masses,  metastatic process.  Lung bx c/w metastatic NSCLC, adenocarcinoma  - pt s/p nsgy, resection of brain met  patient has also been seen by rad/Onc. Getting steroids and keppra.   Further med onc mgmt as outpt  pathology being sent for foundation testing.     2)Leukocytosis- likely reactive/steroid related    3) CAD- s/p MI. AFIB mgmt as per mrd, cv    4) PE- ac as per med    5) UTI- mgmt as per med, ID    6) anemia - post-op. monitor cbc

## 2022-08-08 NOTE — PROGRESS NOTE ADULT - ASSESSMENT
ASSESSMENT:    82 year old gentlewoman, former smoker, without history of intrinsic lung disease. She has a history of HTN, HLD, DM, CAD s/p PCI (1990s and 2021) and PAD s/p RLE stenting. The patient has been having difficulty ambulating over the last several months due to a sense of imbalance with impaired gait. Her family has noted a change in her personality with pressured speech and insomnia. Outpatient cervical MRI incidentally note a CNS lesion. Lumbar spine MRI performed due to chronic back pain revealed multilevel foraminal stenosis/disc bulges. 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 The patient has no shortness of breath or hypoxemia on room air. She has no cough, sputum production, chest congestion or wheeze. No fevers, chills or sweats. No chest pain/pressure or palpitations.     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 presenting with several months of ataxia - frontal lobe lesions have lead to decreased inhibition as well as to pressured and loquacious speech    8/2 - chest pain last night -> EKG c/w STEMI - cardiac enzymes are elevated -> catheterization not performed due to recent neurosurgery -> started on ASA and heparin gtt -> now quite lethargic although awakes to state her name and the name of her children    8/4 - much more awake and alert -> A & O x 3 -> very slow speech; remains in the CCU -> EKG c/w STEMI - cardiac enzymes are elevated -> catheterization not performed due to recent neurosurgery -> continues on ASA and heparin gtt without a change in brain CT -> troponin level continues to increase; no shortness of breath or hypoxemia on a 2lpm nasal canula    8/5 - much more awake and alert -> A & O x 3 -> slow speech and talking as if intoxicated; transferred to the surgical ICU; continues on ASA and heparin gtt without a change in brain CT following a NSTEMI; no shortness of breath or hypoxemia on room air; no seizures on EEG; receiving NGT feeds due to dysphagia    8/8 - looks well  on the neurology floor; awake and alert chatting with her family while sitting in the chair - speech is now fluent; no shortness of breath or hypoxemia on room air; no cough, sputum production, hemoptysis, chest congestion or wheeze; no fevers, chills or sweats; no chest pain/pressure or palpitations; s/p bronchoscopy with endobronchial brushings/biopsies of an obstructing left upper lobe endobronchial lesion -> 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; no seizures on EEG; NGT is out and the patient is enjoying a regular diet      PLAN/RECOMMENDATIONS:    stable oxygenation on room air  mental status has returned to normal on zosyn for E coli and Klebsiella UTI and possible post-obstructive pneumonia - MRSA nasal swab is negative -> vancomycin discontinued  CTA -> pulmonary embolism in the lateral segmental branch of the right middle lobe pulmonary artery without evidence of right heart strain - multiple segmental branches in the left lung are obscured by motion - 1.2 cm left hilar lymph node - patent central airways - left upper lobe mass decreased in size - obstruction of the left apical posterior bronchus with associated segmental atelectasis along the mediastinum  spirometry    FEV1 - 1.81 liters - 88% predicted    FVC - 2.48 liters - 90% predicted    FEV1% - 73       c/w normal spirometry  patient is already on heparin gtt for ACS which will treat the pulmonary embolism -> would switch to a DOAC   s/p bronchoscopy -> 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 - foundation studies have been ordered  cardiology evaluation noted     STEMI - not a candidate for percutaneous coronary intervention given recent craniotomy limiting anti-platelet agents    medical management including ASA/lipitor/heparin gtt/losartan/metoprolol     repeat ECHO for LVEF following STEMI  neurosurgery evaluation noted    s/p right craniotomy for tumor resection    repeat CT scan 8/2  -> right temporal craniotomy - minimally decreased right frontal pneumocephalus - similar subdural hemorrhage along the right lateral convexity measuring up to 7 to 8 mm in greatest depth anteriorly - extensive edema surrounding the right temporal surgical bed - leftward midline shift of 6 to 7 mm - partial effacement of the suprasellar and right perimesencephalic cisterns - mild asymmetric dilatation of the left lateral ventricle - low density lesion with mild surrounding edema in the left parasagittal frontal lobe.    prophylactic anticonvulsants -> brivaracetam - no seizures on EEG    decadron 1mg q12h IV for cerebral edema -> glucose control on steroids    incentive spirometry as able    regular diet -> NGT is out    GI/DVT prophylaxis - protonix/heparin gtt  bowel regimen  physical therapy    Will follow with you. Plan of care discussed with the patient and her family at bedside. Discharge planning    Satish Hopkins MD, [facility]  [phone]  Pulmonary Medicine

## 2022-08-08 NOTE — CONSULT NOTE ADULT - SUBJECTIVE AND OBJECTIVE BOX
seen and examined full consult to follow. repeat k at 8 pm  seen and examined full consult to follow. repeat k at 8 pm       [facility]  [phone]  NEPHROLOGY      INITIAL CONSULT NOTE  --------------------------------------------------------------------------------  HPI:       81 y/o female (former smoker) with PMHx of HTN, CAD, HLD, MI, peripheral neuropathy, DM and peripheral artery disease presented to the ED for imaging for her head due to abnormal spine MRI by her orthopedist (Dr. Castro) during workup for her spine for her chronic back problems. The orthopedist noted something in her brain. Otherwise pt has no symptoms, stated that since 04/2022 she has had gait/balance issues. Endorsed that if she sits for a while and gets up, she feels like she is "drunk" and feels off.   s/p craniotomy for resection of brain mass 7/27, path showing NSC carcinoma (favor adeno) post op STEMI - TTE 7/22 with normal LV internal dimensions with discrete upper septal hypertrophy. EF 55%, inferolateral wall is akinetic.  pt noticed with hyperkalemia. renal consult called.       PAST HISTORY  --------------------------------------------------------------------------------  PAST MEDICAL & SURGICAL HISTORY:  Hypertension      Coronary artery disease      Hyperlipidemia      Peripheral artery disease      Myocardial infarct      Peripheral neuropathy      Diabetes      PAD (peripheral artery disease)      CAD (coronary artery disease)  stents        FAMILY HISTORY:  Family history of stroke (Sibling)      PAST SOCIAL HISTORY:    ALLERGIES & MEDICATIONS  --------------------------------------------------------------------------------  Allergies    adhesives (Pruritus)  No Known Drug Allergies    Intolerances      Standing Inpatient Medications  aspirin  chewable 81 milliGRAM(s) Oral daily  atorvastatin 80 milliGRAM(s) Oral at bedtime  BACItracin   Ointment 1 Application(s) Topical two times a day  brivaracetam  IVPB 100 milliGRAM(s) IV Intermittent two times a day  dexAMETHasone  Injectable 1 milliGRAM(s) IV Push every 12 hours  dextrose 50% Injectable 25 Gram(s) IV Push once  dextrose 50% Injectable 12.5 Gram(s) IV Push once  dextrose 50% Injectable 25 Gram(s) IV Push once  dextrose Oral Gel 15 Gram(s) Oral once  gabapentin 200 milliGRAM(s) Oral at bedtime  glucagon  Injectable 1 milliGRAM(s) IntraMuscular once  heparin  Infusion 750 Unit(s)/Hr IV Continuous <Continuous>  insulin glargine Injectable (LANTUS) 24 Unit(s) SubCutaneous at bedtime  insulin lispro (ADMELOG) corrective regimen sliding scale   SubCutaneous every 6 hours  insulin lispro Injectable (ADMELOG) 8 Unit(s) SubCutaneous three times a day before meals  metoprolol tartrate 25 milliGRAM(s) Oral two times a day  pantoprazole  Injectable 40 milliGRAM(s) IV Push every 24 hours  psyllium Powder 1 Packet(s) Enteral Tube every 12 hours  senna 2 Tablet(s) Oral at bedtime    PRN Inpatient Medications  melatonin 5 milliGRAM(s) Oral at bedtime PRN      REVIEW OF SYSTEMS  reliability poor   --------------------------------------------------------------------------------  Gen: No  fevers/chills   Skin: No rashes  Head/Eyes/Ears/Mouth: No headache; Normal hearing;  No sinus pain/discomfort, sore throat  Respiratory: No dyspnea, cough, wheezing, hemoptysis  CV: No chest pain, orthopnea  GI: No abdominal pain, diarrhea, nausea, vomiting, melena  : No dysuria, decrease urination or hesitancy urinating  MSK: No joint pain/swelling; no back pain  Neuro: No dizziness/lightheadedness  also with no edema       VITALS/PHYSICAL EXAM  --------------------------------------------------------------------------------  T(C): 36.9 (08-08-22 @ 21:12), Max: 37.2 (08-08-22 @ 13:14)  HR: 73 (08-08-22 @ 21:12) (65 - 73)  BP: 109/61 (08-08-22 @ 21:12) (104/67 - 136/75)  RR: 18 (08-08-22 @ 21:12) (18 - 18)  SpO2: 97% (08-08-22 @ 21:12) (94% - 99%)  Wt(kg): --        08-07-22 @ 07:01  -  08-08-22 @ 07:00  --------------------------------------------------------  IN: 1935 mL / OUT: 1100 mL / NET: 835 mL    08-08-22 @ 07:01  -  08-08-22 @ 22:16  --------------------------------------------------------  IN: 320 mL / OUT: 0 mL / NET: 320 mL      Physical Exam:  	Gen: Non toxic comfortable appearing  + forehead skin ulceration   	no jvd   	Pulm: decrease bs  no rales or ronchi or wheezing  	CV: RRR, S1S2; no rub  	Back: No CVA tenderness  	Abd: +BS, soft, nontender/nondistended  	: No suprapubic tenderness  	UE: Warm, no cyanosis  no clubbing,  no edema  	LE: Warm, no cyanosis  no clubbing, no edema  	Neuro: oriented x 2   	Skin: Warm, no decrease skin turgor       LABS/STUDIES  --------------------------------------------------------------------------------              10.3   12.58 >-----------<  240      [08-08-22 @ 05:19]              32.0     138  |  103  |  50  ----------------------------<  142      [08-08-22 @ 20:19]  5.2   |  25  |  1.21        Ca     9.1     [08-08-22 @ 20:19]  k 5.8   TPro  5.4  /  Alb  2.6  /  TBili  0.2  /  DBili  x   /  AST  22  /  ALT  26  /  AlkPhos  63  [08-07-22 @ 22:20]      PTT: 67.4       [08-08-22 @ 05:19]    CK 40      [08-08-22 @ 20:19]    Creatinine Trend:  SCr 1.21 [08-08 @ 20:19]  SCr 0.98 [08-08 @ 13:55]  SCr 1.09 [08-08 @ 05:19]  SCr 1.05 [08-07 @ 22:20]  SCr 1.04 [08-07 @ 20:41]    Urinalysis - [08-03-22 @ 08:40]      Color Light Yellow / Appearance Clear / SG 1.015 / pH 6.0      Gluc Trace / Ketone Trace  / Bili Negative / Urobili Negative       Blood Negative / Protein Negative / Leuk Est Large / Nitrite Negative      RBC 1 / WBC 51 / Hyaline 1 / Gran  / Sq Epi  / Non Sq Epi 0 / Bacteria Many      TSH 0.27      [07-20-22 @ 06:50]  Lipid: chol 136, TG 73, HDL 59, LDL --      [08-01-22 @ 20:29]

## 2022-08-08 NOTE — CONSULT NOTE ADULT - REASON FOR ADMISSION
abnormal imaging

## 2022-08-09 LAB
ANION GAP SERPL CALC-SCNC: 12 MMOL/L — SIGNIFICANT CHANGE UP (ref 5–17)
APTT BLD: 65.9 SEC — HIGH (ref 27.5–35.5)
BUN SERPL-MCNC: 49 MG/DL — HIGH (ref 7–23)
CALCIUM SERPL-MCNC: 9 MG/DL — SIGNIFICANT CHANGE UP (ref 8.4–10.5)
CHLORIDE SERPL-SCNC: 103 MMOL/L — SIGNIFICANT CHANGE UP (ref 96–108)
CO2 SERPL-SCNC: 23 MMOL/L — SIGNIFICANT CHANGE UP (ref 22–31)
CREAT SERPL-MCNC: 1.04 MG/DL — SIGNIFICANT CHANGE UP (ref 0.5–1.3)
EGFR: 54 ML/MIN/1.73M2 — LOW
GLUCOSE BLDC GLUCOMTR-MCNC: 130 MG/DL — HIGH (ref 70–99)
GLUCOSE BLDC GLUCOMTR-MCNC: 185 MG/DL — HIGH (ref 70–99)
GLUCOSE BLDC GLUCOMTR-MCNC: 203 MG/DL — HIGH (ref 70–99)
GLUCOSE BLDC GLUCOMTR-MCNC: 221 MG/DL — HIGH (ref 70–99)
GLUCOSE SERPL-MCNC: 209 MG/DL — HIGH (ref 70–99)
HCT VFR BLD CALC: 33.2 % — LOW (ref 34.5–45)
HGB BLD-MCNC: 10.8 G/DL — LOW (ref 11.5–15.5)
LDH SERPL L TO P-CCNC: 321 U/L — HIGH (ref 50–242)
MCHC RBC-ENTMCNC: 31.3 PG — SIGNIFICANT CHANGE UP (ref 27–34)
MCHC RBC-ENTMCNC: 32.5 GM/DL — SIGNIFICANT CHANGE UP (ref 32–36)
MCV RBC AUTO: 96.2 FL — SIGNIFICANT CHANGE UP (ref 80–100)
NRBC # BLD: 0 /100 WBCS — SIGNIFICANT CHANGE UP (ref 0–0)
PLATELET # BLD AUTO: 268 K/UL — SIGNIFICANT CHANGE UP (ref 150–400)
POTASSIUM SERPL-MCNC: 4.8 MMOL/L — SIGNIFICANT CHANGE UP (ref 3.5–5.3)
POTASSIUM SERPL-SCNC: 4.8 MMOL/L — SIGNIFICANT CHANGE UP (ref 3.5–5.3)
RBC # BLD: 3.45 M/UL — LOW (ref 3.8–5.2)
RBC # FLD: 12.6 % — SIGNIFICANT CHANGE UP (ref 10.3–14.5)
SODIUM SERPL-SCNC: 138 MMOL/L — SIGNIFICANT CHANGE UP (ref 135–145)
WBC # BLD: 13.59 K/UL — HIGH (ref 3.8–10.5)
WBC # FLD AUTO: 13.59 K/UL — HIGH (ref 3.8–10.5)

## 2022-08-09 PROCEDURE — 99232 SBSQ HOSP IP/OBS MODERATE 35: CPT

## 2022-08-09 RX ORDER — BRIVARACETAM 25 MG/1
100 TABLET, FILM COATED ORAL
Refills: 0 | Status: DISCONTINUED | OUTPATIENT
Start: 2022-08-09 | End: 2022-08-09

## 2022-08-09 RX ORDER — INSULIN GLARGINE 100 [IU]/ML
28 INJECTION, SOLUTION SUBCUTANEOUS AT BEDTIME
Refills: 0 | Status: DISCONTINUED | OUTPATIENT
Start: 2022-08-09 | End: 2022-08-10

## 2022-08-09 RX ORDER — APIXABAN 2.5 MG/1
5 TABLET, FILM COATED ORAL EVERY 12 HOURS
Refills: 0 | Status: DISCONTINUED | OUTPATIENT
Start: 2022-08-09 | End: 2022-08-10

## 2022-08-09 RX ORDER — BRIVARACETAM 25 MG/1
100 TABLET, FILM COATED ORAL
Refills: 0 | Status: DISCONTINUED | OUTPATIENT
Start: 2022-08-10 | End: 2022-08-10

## 2022-08-09 RX ORDER — INSULIN LISPRO 100/ML
10 VIAL (ML) SUBCUTANEOUS
Refills: 0 | Status: DISCONTINUED | OUTPATIENT
Start: 2022-08-09 | End: 2022-08-10

## 2022-08-09 RX ADMIN — PANTOPRAZOLE SODIUM 40 MILLIGRAM(S): 20 TABLET, DELAYED RELEASE ORAL at 18:10

## 2022-08-09 RX ADMIN — BRIVARACETAM 240 MILLIGRAM(S): 25 TABLET, FILM COATED ORAL at 05:18

## 2022-08-09 RX ADMIN — Medication 1 APPLICATION(S): at 18:11

## 2022-08-09 RX ADMIN — APIXABAN 5 MILLIGRAM(S): 2.5 TABLET, FILM COATED ORAL at 11:22

## 2022-08-09 RX ADMIN — Medication 4: at 12:25

## 2022-08-09 RX ADMIN — Medication 4: at 08:00

## 2022-08-09 RX ADMIN — Medication 25 MILLIGRAM(S): at 18:10

## 2022-08-09 RX ADMIN — Medication 5 MILLIGRAM(S): at 22:46

## 2022-08-09 RX ADMIN — Medication 1 MILLIGRAM(S): at 05:20

## 2022-08-09 RX ADMIN — Medication 8 UNIT(S): at 12:25

## 2022-08-09 RX ADMIN — Medication 8 UNIT(S): at 08:00

## 2022-08-09 RX ADMIN — Medication 25 MILLIGRAM(S): at 05:20

## 2022-08-09 RX ADMIN — Medication 81 MILLIGRAM(S): at 11:22

## 2022-08-09 RX ADMIN — APIXABAN 5 MILLIGRAM(S): 2.5 TABLET, FILM COATED ORAL at 21:57

## 2022-08-09 RX ADMIN — Medication 1 PACKET(S): at 06:20

## 2022-08-09 RX ADMIN — BRIVARACETAM 240 MILLIGRAM(S): 25 TABLET, FILM COATED ORAL at 18:11

## 2022-08-09 RX ADMIN — ATORVASTATIN CALCIUM 80 MILLIGRAM(S): 80 TABLET, FILM COATED ORAL at 21:56

## 2022-08-09 RX ADMIN — Medication 1 APPLICATION(S): at 06:20

## 2022-08-09 RX ADMIN — GABAPENTIN 200 MILLIGRAM(S): 400 CAPSULE ORAL at 21:56

## 2022-08-09 RX ADMIN — HEPARIN SODIUM 7.5 UNIT(S)/HR: 5000 INJECTION INTRAVENOUS; SUBCUTANEOUS at 06:45

## 2022-08-09 RX ADMIN — INSULIN GLARGINE 28 UNIT(S): 100 INJECTION, SOLUTION SUBCUTANEOUS at 21:56

## 2022-08-09 NOTE — PROGRESS NOTE ADULT - SUBJECTIVE AND OBJECTIVE BOX
CARDIOLOGY FOLLOW UP - Dr. Alcaraz  DATE OF SERVICE: 8/8/22     CC no cp or sob   feels well   fam at bedside       REVIEW OF SYSTEMS:  CONSTITUTIONAL: No fever, weight loss, or fatigue  RESPIRATORY: No cough, wheezing, chills or hemoptysis; No Shortness of Breath  CARDIOVASCULAR: No chest pain, palpitations, passing out, dizziness, or leg swelling  GASTROINTESTINAL: No abdominal or epigastric pain. No nausea, vomiting, or hematemesis; No diarrhea or constipation. No melena or hematochezia.  VASCULAR: No edema     PHYSICAL EXAM:  T(C): 36.5 (08-09-22 @ 10:39), Max: 37.2 (08-08-22 @ 13:14)  HR: 64 (08-09-22 @ 10:39) (62 - 80)  BP: 123/78 (08-09-22 @ 10:39) (100/83 - 123/78)  RR: 18 (08-09-22 @ 10:39) (18 - 18)  SpO2: 98% (08-09-22 @ 10:39) (96% - 99%)  Wt(kg): --  I&O's Summary    08 Aug 2022 07:01  -  09 Aug 2022 07:00  --------------------------------------------------------  IN: 870 mL / OUT: 600 mL / NET: 270 mL    09 Aug 2022 07:01  -  09 Aug 2022 12:26  --------------------------------------------------------  IN: 15 mL / OUT: 0 mL / NET: 15 mL        Appearance: Normal	  Cardiovascular: Normal S1 S2,RRR  Respiratory: Lungs clear to auscultation	  Gastrointestinal:  Soft, Non-tender, + BS	  Extremities: Normal range of motion, No clubbing, cyanosis or edema      Home Medications:  acetaminophen 325 mg oral tablet: 2 tab(s) orally every 6 hours, As needed, Temp greater or equal to 38C (100.4F), Mild Pain (1 - 3) (01 Aug 2022 13:52)  amLODIPine 5 mg oral tablet: 1 tab(s) orally once a day (01 Aug 2022 13:52)  atorvastatin 40 mg oral tablet: 1 tab(s) orally once a day (at bedtime) (01 Aug 2022 13:52)  bisacodyl 5 mg oral delayed release tablet: 1 tab(s) orally once a day, As needed, Constipation (01 Aug 2022 13:52)  dexamethasone 2 mg oral tablet: 1 tab po every 8 hours for 2 days, then 1 tab po q12 hours x2 days, then 1 tab po daily x3 days, then stop (01 Aug 2022 13:52)  divalproex sodium 500 mg oral delayed release tablet: 1 tab(s) orally every 8 hours (01 Aug 2022 13:52)  enoxaparin: 40 milligram(s) subcutaneous once a day (at bedtime) (01 Aug 2022 13:52)  gabapentin 100 mg oral capsule: 2 cap(s) orally once a day (at bedtime) (01 Aug 2022 13:52)  insulin glargine 100 units/mL subcutaneous solution: 15 unit(s) subcutaneous once a day (at bedtime) (01 Aug 2022 13:52)  insulin lispro 100 units/mL injectable solution: 5 unit(s) subcutaneous once a day before lunch (01 Aug 2022 13:52)  insulin lispro 100 units/mL injectable solution: 7 unit(s) subcutaneous once a day before breakfast (01 Aug 2022 13:52)  insulin lispro 100 units/mL injectable solution: 7 unit(s) subcutaneous once a day before dinner (01 Aug 2022 13:52)  insulin lispro 100 units/mL injectable solution: moderate dose sliding scale as per hospital/facility protocol, 3 times a day (before meals) (01 Aug 2022 13:52)  losartan 100 mg oral tablet: 1 tab(s) orally once a day (01 Aug 2022 13:52)  melatonin 5 mg oral tablet: 1 tab(s) orally once a day (at bedtime), As needed, Sleep (01 Aug 2022 13:52)  metoprolol tartrate 25 mg oral tablet: 1 tab(s) orally 2 times a day (01 Aug 2022 13:52)  oxyCODONE 10 mg oral tablet: 1 tab(s) orally every 4 hours, As needed, Severe Pain (7 - 10) (01 Aug 2022 13:52)  oxyCODONE 5 mg oral tablet: 1 tab(s) orally every 4 hours, As needed, Moderate Pain (4 - 6) (01 Aug 2022 13:52)  pantoprazole 40 mg oral delayed release tablet: 1 tab(s) orally once a day (before a meal) (01 Aug 2022 13:52)  polyethylene glycol 3350 oral powder for reconstitution: 17 gram(s) orally 2 times a day (01 Aug 2022 13:52)  senna leaf extract oral tablet: 2 tab(s) orally once a day (at bedtime) (01 Aug 2022 13:52)      MEDICATIONS  (STANDING):  apixaban 5 milliGRAM(s) Oral every 12 hours  aspirin  chewable 81 milliGRAM(s) Oral daily  atorvastatin 80 milliGRAM(s) Oral at bedtime  BACItracin   Ointment 1 Application(s) Topical two times a day  brivaracetam  IVPB 100 milliGRAM(s) IV Intermittent two times a day  dexAMETHasone  Injectable 1 milliGRAM(s) IV Push every 12 hours  dextrose 50% Injectable 25 Gram(s) IV Push once  dextrose 50% Injectable 12.5 Gram(s) IV Push once  dextrose 50% Injectable 25 Gram(s) IV Push once  dextrose Oral Gel 15 Gram(s) Oral once  gabapentin 200 milliGRAM(s) Oral at bedtime  glucagon  Injectable 1 milliGRAM(s) IntraMuscular once  insulin glargine Injectable (LANTUS) 24 Unit(s) SubCutaneous at bedtime  insulin lispro (ADMELOG) corrective regimen sliding scale   SubCutaneous every 6 hours  insulin lispro Injectable (ADMELOG) 8 Unit(s) SubCutaneous three times a day before meals  metoprolol tartrate 25 milliGRAM(s) Oral two times a day  pantoprazole  Injectable 40 milliGRAM(s) IV Push every 24 hours  psyllium Powder 1 Packet(s) Enteral Tube every 12 hours  senna 2 Tablet(s) Oral at bedtime      TELEMETRY: sb hr  50-60	    ECG:  	  RADIOLOGY:   DIAGNOSTIC TESTING:  [ ] Echocardiogram:  [ ]  Catheterization:  [ ] Stress Test:    OTHER: 	    LABS:	 	    Creatine Kinase, Serum: 40 U/L [25 - 170] (08-08 @ 20:19)  Troponin T, High Sensitivity Result: 3489 ng/L [0 - 51] (08-07 @ 20:41)  Troponin T, High Sensitivity Result: 1633 ng/L [0 - 51] (08-04 @ 21:53)  Troponin T, High Sensitivity Result: 1535 ng/L [0 - 51] (08-04 @ 15:08)  Troponin T, High Sensitivity Result: 1971 ng/L [0 - 51] (08-04 @ 04:20)  Creatine Kinase, Serum: 265 U/L [25 - 170] (08-04 @ 04:20)  CKMB Units: 18.9 ng/mL [0.0 - 3.8] (08-04 @ 04:20)  Creatine Kinase, Serum: 326 U/L [25 - 170] (08-03 @ 08:57)  CKMB Units: 29.3 ng/mL [0.0 - 3.8] (08-03 @ 08:57)  Troponin T, High Sensitivity Result: 1121 ng/L [0 - 51] (08-03 @ 08:57)  Creatine Kinase, Serum: 355 U/L [25 - 170] (08-03 @ 07:45)  CKMB Units: 32.0 ng/mL [0.0 - 3.8] (08-03 @ 07:45)  Troponin T, High Sensitivity Result: 1120 ng/L [0 - 51] (08-03 @ 07:45)  CKMB Units: 63.2 ng/mL [0.0 - 3.8] (08-02 @ 17:12)  Creatine Kinase, Serum: 466 U/L [25 - 170] (08-02 @ 17:12)  Troponin T, High Sensitivity Result: 948 ng/L [0 - 51] (08-02 @ 17:12)                          10.8   13.59 )-----------( 268      ( 09 Aug 2022 06:20 )             33.2     08-09    138  |  103  |  49<H>  ----------------------------<  209<H>  4.8   |  23  |  1.04    Ca    9.0      09 Aug 2022 06:20    TPro  5.4<L>  /  Alb  2.6<L>  /  TBili  0.2  /  DBili  x   /  AST  22  /  ALT  26  /  AlkPhos  63  08-07    PTT - ( 09 Aug 2022 06:20 )  PTT:65.9 sec

## 2022-08-09 NOTE — PROGRESS NOTE ADULT - SUBJECTIVE AND OBJECTIVE BOX
DATE OF SERVICE: 08-09-22 @ 12:24    Patient is a 82y old  Female who presents with a chief complaint of abnormal imaging (09 Aug 2022 11:47)      SUBJECTIVE / OVERNIGHT EVENTS:  No chest pain. No shortness of breath. No complaints. No events overnight.     MEDICATIONS  (STANDING):  apixaban 5 milliGRAM(s) Oral every 12 hours  aspirin  chewable 81 milliGRAM(s) Oral daily  atorvastatin 80 milliGRAM(s) Oral at bedtime  BACItracin   Ointment 1 Application(s) Topical two times a day  brivaracetam  IVPB 100 milliGRAM(s) IV Intermittent two times a day  dexAMETHasone  Injectable 1 milliGRAM(s) IV Push every 12 hours  dextrose 50% Injectable 25 Gram(s) IV Push once  dextrose 50% Injectable 12.5 Gram(s) IV Push once  dextrose 50% Injectable 25 Gram(s) IV Push once  dextrose Oral Gel 15 Gram(s) Oral once  gabapentin 200 milliGRAM(s) Oral at bedtime  glucagon  Injectable 1 milliGRAM(s) IntraMuscular once  insulin glargine Injectable (LANTUS) 24 Unit(s) SubCutaneous at bedtime  insulin lispro (ADMELOG) corrective regimen sliding scale   SubCutaneous every 6 hours  insulin lispro Injectable (ADMELOG) 8 Unit(s) SubCutaneous three times a day before meals  metoprolol tartrate 25 milliGRAM(s) Oral two times a day  pantoprazole  Injectable 40 milliGRAM(s) IV Push every 24 hours  psyllium Powder 1 Packet(s) Enteral Tube every 12 hours  senna 2 Tablet(s) Oral at bedtime    MEDICATIONS  (PRN):  melatonin 5 milliGRAM(s) Oral at bedtime PRN Sleep      Vital Signs Last 24 Hrs  T(C): 36.5 (09 Aug 2022 10:39), Max: 37.2 (08 Aug 2022 13:14)  T(F): 97.7 (09 Aug 2022 10:39), Max: 98.9 (08 Aug 2022 13:14)  HR: 64 (09 Aug 2022 10:39) (62 - 80)  BP: 123/78 (09 Aug 2022 10:39) (100/83 - 123/78)  BP(mean): --  RR: 18 (09 Aug 2022 10:39) (18 - 18)  SpO2: 98% (09 Aug 2022 10:39) (96% - 99%)    Parameters below as of 09 Aug 2022 10:39  Patient On (Oxygen Delivery Method): room air      CAPILLARY BLOOD GLUCOSE      POCT Blood Glucose.: 221 mg/dL (09 Aug 2022 11:46)  POCT Blood Glucose.: 203 mg/dL (09 Aug 2022 07:25)  POCT Blood Glucose.: 191 mg/dL (08 Aug 2022 21:42)  POCT Blood Glucose.: 148 mg/dL (08 Aug 2022 17:27)  POCT Blood Glucose.: 219 mg/dL (08 Aug 2022 12:51)    I&O's Summary    08 Aug 2022 07:01  -  09 Aug 2022 07:00  --------------------------------------------------------  IN: 870 mL / OUT: 600 mL / NET: 270 mL    09 Aug 2022 07:01  -  09 Aug 2022 12:24  --------------------------------------------------------  IN: 15 mL / OUT: 0 mL / NET: 15 mL        PHYSICAL EXAM:  GENERAL: NAD, well-developed  HEAD:  Atraumatic, Normocephalic  EYES: EOMI, PERRLA, conjunctiva and sclera clear  NECK: Supple, No JVD  CHEST/LUNG: Clear to auscultation bilaterally; No wheeze  HEART: Regular rate and rhythm; No murmurs, rubs, or gallops  ABDOMEN: Soft, Nontender, Nondistended; Bowel sounds present  EXTREMITIES:  2+ Peripheral Pulses, No clubbing, cyanosis, or edema  PSYCH: AAOx3  NEUROLOGY: non-focal  SKIN: No rashes or lesions    LABS:                        10.8   13.59 )-----------( 268      ( 09 Aug 2022 06:20 )             33.2     08-09    138  |  103  |  49<H>  ----------------------------<  209<H>  4.8   |  23  |  1.04    Ca    9.0      09 Aug 2022 06:20    TPro  5.4<L>  /  Alb  2.6<L>  /  TBili  0.2  /  DBili  x   /  AST  22  /  ALT  26  /  AlkPhos  63  08-07    PTT - ( 09 Aug 2022 06:20 )  PTT:65.9 sec  CARDIAC MARKERS ( 08 Aug 2022 20:19 )  x     / x     / 40 U/L / x     / x              RADIOLOGY & ADDITIONAL TESTS:    Imaging Personally Reviewed:    Consultant(s) Notes Reviewed:      Care Discussed with Consultants/Other Providers:

## 2022-08-09 NOTE — PROGRESS NOTE ADULT - ASSESSMENT
Assessment  DMT2: 82y Female with DM T2 with hyperglycemia, A1C 8.4%, was on oral meds and insulin at home, DC'd NPH and transitioned to basal bolus insulin, blood sugars running slightly high and not at target, no hypoglycemic episodes. Remains on dexamethasone, eating meals with fair appetite, appears alert and comfortable.  Brain Mass: on meds, monitored, FU Neurosurgery.  Lung Mass: for bronchoscopy, monitored.  Thyroid Nodules: seen on imaging, B/L subcentimeter thyroid nodules, she denies neck mass/pain/dysphagia, euthyroid, reports known history and is being followed by Dr. Richardson.  Adrenal Nodule: seen on imaging, 1.2 cm left thyroid nodule, unknown history.

## 2022-08-09 NOTE — PROGRESS NOTE ADULT - SUBJECTIVE AND OBJECTIVE BOX
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; no seizures on EEG; NGT is out and the patient is enjoying a regular diet; continues on IV heparin and IV anticonvulsants    REVIEW OF SYSTEMS:  Constitutional: As per interval history  HEENT: Within normal limits  CV: As per interval history  Resp: As per interval history  GI: dysphagia -> resolved   : Within normal limits  Musculoskeletal: Within normal limits  Skin: Within normal limits  Neurological: lethargy -> resovled  Psychiatric: Within normal limits  Endocrine: Within normal limits  Hematologic/Lymphatic: 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  Allergic/Immunologic: Within normal limits    MEDICATIONS:     Pulmonary "    Anti-microbials:    Cardiovascular:  metoprolol tartrate 25 milliGRAM(s) Oral two times a day    Other:  apixaban 5 milliGRAM(s) Oral every 12 hours  aspirin  chewable 81 milliGRAM(s) Oral daily  atorvastatin 80 milliGRAM(s) Oral at bedtime  BACItracin   Ointment 1 Application(s) Topical two times a day  brivaracetam  IVPB 100 milliGRAM(s) IV Intermittent two times a day  dexAMETHasone  Injectable 1 milliGRAM(s) IV Push every 12 hours  dextrose 50% Injectable 25 Gram(s) IV Push once  dextrose 50% Injectable 12.5 Gram(s) IV Push once  dextrose 50% Injectable 25 Gram(s) IV Push once  dextrose Oral Gel 15 Gram(s) Oral once  gabapentin 200 milliGRAM(s) Oral at bedtime  glucagon  Injectable 1 milliGRAM(s) IntraMuscular once  insulin glargine Injectable (LANTUS) 24 Unit(s) SubCutaneous at bedtime  insulin lispro (ADMELOG) corrective regimen sliding scale   SubCutaneous every 6 hours  insulin lispro Injectable (ADMELOG) 8 Unit(s) SubCutaneous three times a day before meals  pantoprazole  Injectable 40 milliGRAM(s) IV Push every 24 hours  psyllium Powder 1 Packet(s) Enteral Tube every 12 hours  senna 2 Tablet(s) Oral at bedtime    MEDICATIONS  (PRN):  melatonin 5 milliGRAM(s) Oral at bedtime PRN Sleep    OBJECTIVE:    POCT Blood Glucose.: 203 mg/dL (09 Aug 2022 07:25)  POCT Blood Glucose.: 191 mg/dL (08 Aug 2022 21:42)  POCT Blood Glucose.: 148 mg/dL (08 Aug 2022 17:27)  POCT Blood Glucose.: 219 mg/dL (08 Aug 2022 12:51)  POCT Blood Glucose.: 206 mg/dL (08 Aug 2022 11:23)      PHYSICAL EXAM:       ICU Vital Signs Last 24 Hrs  T(C): 36.6 (09 Aug 2022 04:58), Max: 37.2 (08 Aug 2022 13:14)  T(F): 97.9 (09 Aug 2022 04:58), Max: 98.9 (08 Aug 2022 13:14)  HR: 80 (09 Aug 2022 04:58) (62 - 80)  BP: 100/83 (09 Aug 2022 04:58) (100/83 - 122/79)  BP(mean): --  ABP: --  ABP(mean): --  RR: 18 (09 Aug 2022 04:58) (18 - 18)  SpO2: 99% (09 Aug 2022 04:58) (96% - 99%) on room air    General: Awake. Alert. Cooperative. No distress. Appears stated age. Sitting in the chair  HEENT:  Right craniotomy incision stapled. Normocephalic. Anicteric. Normal oral mucosa. PERRL. EOMI.  Neck: Supple. Trachea midline. Thyroid without enlargement/tenderness/nodules. No carotid bruit. No JVD.	  Cardiovascular: Regular rate and rhythm. S1 S2 normal. No murmurs, rubs or gallops.  Respiratory: Respirations unlabored. Clear to auscultation and percussion bilaterally. No curvature.  Abdomen: Soft. Non-tender. Non-distended. No organomegaly. No masses. Normal bowel sounds.  Extremities: Warm to touch. No clubbing or cyanosis. No pedal edema.   Pulses: 2+ peripheral pulses all extremities.	  Skin: Craniotomy scar C/D/I. Abrasion with ulceration on the forehead at the site of the craniotomy dressing  Lymph Nodes: Cervical, supraclavicular and axillary nodes normal  Neurological: Moving all extremities. A and O x 3.  Psychiatry: Appropriate mood and affect.    LABS:                          10.8   13.59 )-----------( 268      ( 09 Aug 2022 06:20 )             33.2     CBC    WBC  13.59 <==, 12.58 <==, 11.75 <==, 13.06 <==, 12.70 <==, 16.18 <==, 17.41 <==    Hemoglobin  10.8 <<==, 10.3 <<==, 10.2 <<==, 10.6 <<==, 10.6 <<==, 14.0 <<==, 12.4 <<==    Hematocrit  33.2 <==, 32.0 <==, 31.8 <==, 32.9 <==, 33.4 <==, 44.3 <==, 38.9 <==    Platelets  268 <==, 240 <==, 223 <==, 248 <==, 228 <==, 229 <==, 219 <==      138  |  103  |  49<H>  ----------------------------<  209<H>    08-09  4.8   |  23  |  1.04      LYTES    sodium  138 <==, 138 <==, 138 <==, 137 <==, 137 <==, 139 <==, 141 <==    potassium   4.8 <==, 5.2 <==, 5.0 <==, 5.8 <==, 5.7 <==, 5.4 <==, 4.5 <==    chloride  103 <==, 103 <==, 102 <==, 102 <==, 103 <==, 104 <==, 103 <==    carbon dioxide  23 <==, 25 <==, 27 <==, 27 <==, 26 <==, 25 <==, 27 <==    =============================================================================================  RENAL FUNCTION:    Creatinine:   1.04  <<==, 1.21  <<==, 0.98  <<==, 1.09  <<==, 1.05  <<==, 1.04  <<==, 1.01  <<==    BUN:   49 <==, 50 <==, 50 <==, 49 <==, 49 <==, 46 <==, 46 <==    ============================================================================================    calcium   9.0 <==, 9.1 <==, 9.2 <==, 9.0 <==, 8.9 <==, 9.1 <==, 8.9 <==    phos   4.0 <==, 4.6 <==, 5.3 <==, 2.7 <==    mag   2.4 <==, 2.5 <==, 1.8 <==, 2.2 <==    ============================================================================================  LFTs    AST:   22 <== , 37 <== , 48 <== , 57 <==     ALT:  26  <== , 22  <== , 17  <== , 16  <==     AP:  63  <=, 79  <=, 57  <=, 66  <=    Bili:  0.2  <=, 0.5  <=, 0.4  <=, 0.2  <=    PT/INR - ( 04 Aug 2022 21:53 )   PT: 11.4 sec;   INR: 0.99 ratio       PTT - ( 09 Aug 2022 06:20 )  PTT:65.9 sec      Serum Pro-Brain Natriuretic Peptide: 4009 pg/mL ( @ 21:53)    CARDIAC MARKERS ( 08 Aug 2022 20:19 )  CPK 40 U/L /CKMB x     /CKMB Units x        troponin x        CARDIAC MARKERS ( 07 Aug 2022 20:41 )  CPK x     /CKMB x     /CKMB Units x        troponin 3489 ng/L    CARDIAC MARKERS ( 04 Aug 2022 21:53 )  CPK x     /CKMB x     /CKMB Units x        troponin 1633 ng/L    CARDIAC MARKERS ( 04 Aug 2022 15:08 )  CPK x     /CKMB x     /CKMB Units x        troponin 1535 ng/L    < from: Limited Transthoracic Echo (w/Cont) (22 @ 15:43) >    Patient name: LANIE BERTRAND  YOB: 1940   Age: 82 (F)   MR#: [id number]  Study Date: 2022  ------------------------------------------------------------------------  Dimensions:    Normal Values:  LA:            2.0 - 4.0 cm  Ao:            2.0 - 3.8 cm  SEPTUM:        0.6 - 1.2 cm  PWT:   0.7    0.6 - 1.1 cm  LVIDd:  3.3    3.0 - 5.6 cm  LVIDs:  2.7    1.8 - 4.0 cm  Derived variables:  RWT: 0.46  Fractional short: 16 %  EF (Visual Estimate): 60-65 %  ------------------------------------------------------------------------  Observations:  Aortic Valve/Aorta:  Left Ventricle: Endocardial visualization enhanced with  intravenous injection of Ultrasonic Enhancing Agent  (Lumason). Mild segmental left ventricular systolic  dysfunction. No left ventricular thrombus. The basal  inferolateral wall, the basal inferior wall, the basal  anterolateral wall, and the mid inferior wall are  hypokinetic.  Right Heart: Normal right ventricular size and function.  Hemodynamic: Estimated right atrial pressure is 8 mm Hg.  ------------------------------------------------------------------------  Conclusions:  1. Endocardial visualization enhanced with intravenous  injection of Ultrasonic Enhancing Agent (Lumason). Mild  segmental left ventricular systolic dysfunction. No left  ventricular thrombus. The basal  inferolateral wall, the  basal inferior wall, the basal anterolateral wall, and the  mid inferior wall are hypokinetic.  *** Compared with echocardiogram of 2022, the apical  akinesis in not seen in the current study.  ------------------------------------------------------------------------  Confirmed on  2022 - 19:12:19 by Pedro Luis Lorenzo M.D.  ------------------------------------------------------------------------  ---------------------------------------------------------------------------------------------------------------  < from: Limited Transthoracic Echo (w/Cont) (22 @ 08:02) >    Patient name: LANIE BERTRAND  YOB: 1940   Age: 82 (F)   MR#: [id number]  Study Date: 2022  ------------------------------------------------------------------------  Conclusions:  1. Endocardial visualization enhanced with intravenous  injection of Ultrasonic Enhancing Agent (Lumason). Left  ventricle not well visualized despite intravenous  ultrasonic enhancing agent; grossly, preserved left  ventricular systolic function with segmental wall motion  abnormalities. EF approximately 55%. The mid to distal  inferolateral and mid to distal inferior segments are  hypkinetic. The apex is akinetic. No left ventricular  thrombus seen.  *** Compared with echocardiogram of 2022, results are  similar on today's study. The mid to distal inferior and  the apex appear hypo/akinetic on today's study on images  with intravenous ultrasonic enhancing agent.  ------------------------------------------------------------------------  Confirmed on  2022 - 10:57:19 by Kelsie Becker M.D.  ------------------------------------------------------------------------  ------------------------------------------------------------------------  Surgical Pathology Report (22 @ 11:53)   Surgical Pathology Report:   1. Left mainstem bronchus biopsy   Final Diagnosis   Bronchus, mainstem, left, biopsy:   In summary the morphology and immunophenotype are consistent with poorly   differentiated adenocarcinoma of lung origin.   Dr. Hopkins was notified of the diagnosis on 2022.   ------------------------------------------------------------------------------  MICROBIOLOGY:   Urinalysis Basic - ( 03 Aug 2022 08:40 )    Color: Light Yellow / Appearance: Clear / S.015 / pH: x  Gluc: x / Ketone: Trace  / Bili: Negative / Urobili: Negative   Blood: x / Protein: Negative / Nitrite: Negative   Leuk Esterase: Large / RBC: 1 /hpf / WBC 51 /HPF   Sq Epi: x / Non Sq Epi: 0 /hpf / Bacteria: Many    Culture - Urine (22 @ 13:05)   Specimen Source: Catheterized Catheterized   Culture Results:   >100,000 CFU/ml Escherichia coli   >100,000 CFU/ml Klebsiella pneumoniae     Culture - Blood (22 @ 10:59)   Specimen Source: .Blood Blood   Culture Results:   No growth to date.     Culture - Blood (22 @ 10:58)   Specimen Source: .Blood Blood   Culture Results:   No growth to date.     MRSA/MSSA PCR (22 @ 07:20)   MRSA PCR Result.: NotDetec  Staph aureus PCR Result: NotDetec     RADIOLOGY:    [facility]  [facility]                          PROCEDURE DATE:  2022      FINDINGS:    PULMONARY ANGIOGRAM: Pulmonary embolism in the lateral segmental branch   of the right middle lobe without evidence of right heart strain. Multiple   segmental branches in the left lung are obscured by motion.    LYMPH NODES: Stable 1.2 cm left hilar lymph node.    HEART/VASCULATURE: The heart is normal in size. No pericardial effusion.   There are aortic, aortic valve, and coronary artery calcifications.    AIRWAYS/LUNGS/PLEURA: Patent central airways. Left upper lobe mass   measuring 2.7 x 2.1 cm, series 5 image 50, decreased in size from prior,   and unchanged obstruction left apical posterior bronchus, and associated   segmental atelectasis along the mediastinum. No pleural effusion or   pneumothorax.    UPPER ABDOMEN: Within normal limits.    BONES/SOFT TISSUES: Stable multinodular thyroid. Metallic hardware in the   right humeral head and shaft. Degenerative changes of the spine.    IMPRESSION:  Pulmonary embolism in the lateral segmental branch of the right middle   lobe. No evidence of right heart strain.    Interval decrease in left upper lobe mass. Unchanged obstruction of the   posterior bronchus, and stable fibroglandular lymph node.    COMMUNICATION:  Discussed positive PE findings with [facility] DAVID Poon by Dr. Michelle Rebollar at 12:15 PM on 8/3/2022.    MICHELLE JAUREGUI MD; Resident Radiologist  This document has been electronically signed.  NEHA BAILON M.D., ATTENDINGRADIOGIST  This document has been electronically signed. Aug  3 2022 12:35PM  ---------------------------------------------------------------------------------------------------------------  EXAM:  CT BRAIN                          PROCEDURE DATE:  2022      IMPRESSION:    Redemonstration of right temporal craniotomy for prior resection of a   lesion. There is a large degree of hypoattenuation throughout the right   temporal lobe and extending into the right basal ganglia and right   parietal lobe, suggestive of a combination of vasogenic edema and/or   encephalomalacia/gliosis. This results in secondary mass effect upon the   parenchyma of the right cerebral hemisphere, not significantly changed.   There is mild mass effect on the ventricular system, right greater than   left. There is secondary right to left midline shift of approximately 5.7   mm. Expected postsurgical extra-axial pneumocephalus is reidentified   without significant change. Tiny expected subdural hematoma overlying the   right anterior superior frontal lobe is reidentified without change.    Reidentified is a hypoattenuating lesion within the left medial frontal   lobe measuring 1.4 x 1.6 cm, compatible with known additional metastasis.   No significant surrounding edema.    NERI CERNA MD; Resident Radiologist  This document has been electronically signed.  ALTAF SEBASTIAN MD; Attending Radiologist  This document has been electronically signed. Aug  3 2022 10:23AM  ---------------------------------------------------------------------------------------------------------------  EXAM:  CT ABDOMEN AND PELVIS IC                        EXAM:  CT CHEST IC                          PROCEDURE DATE:  2022      FINDINGS:  CHEST:  LUNGS AND LARGE AIRWAYS: There is a heterogeneous left upper lobe mass   approximately measuring 3.3 x 2.6 cm (2, 28) obstructing the left apical   posterior bronchus with partial atelectasis of the left upper lobe. Few   scattered bilateral pulmonary nodules measuring up to 3 mm in the right   upper lobe (2, 39).  PLEURA: No pleural effusion.  VESSELS: Atherosclerotic changes of the aorta and coronary arteries.  HEART: Heart size is normal. No pericardial effusion.  MEDIASTINUM AND SIXTO: Soft tissue contiguous with mass versus adenopathy   measuring 1.7 x 1.5 cm (2, 33).  CHEST WALL AND LOWER NECK: Bilateral subcentimeter hypoattenuating   thyroid nodules.    ABDOMEN AND PELVIS:  LIVER: Within normal limits.  BILE DUCTS: Normal caliber.  GALLBLADDER: Within normal limits.  SPLEEN: Within normal limits.  PANCREAS: Within normal limits.  ADRENALS: Indeterminant left adrenal nodule measuring 1.2 cm.  KIDNEYS/URETERS: No hydronephrosis. Renal cysts and subcentimeter   hypoattenuating foci bilaterally, too small to characterize.    BLADDER: Within normal limits.  REPRODUCTIVE ORGANS: Uterus and adnexa within normal limits.    BOWEL: Colonic diverticula withoutevidence of acute diverticulitis. No   bowel obstruction. Appendix is normal.  PERITONEUM: No ascites.  VESSELS: Atherosclerotic changes.  RETROPERITONEUM/LYMPH NODES: No lymphadenopathy.  ABDOMINAL WALL: Within normal limits.  BONES: Degenerative changes. Grade 1 anterolisthesis of L5 on S1. Status   post right shoulder arthroplasty.    IMPRESSION:  Left upper lobe lung mass with partial atelectasis of the left upper   lobe, concerning for primary lung neoplasm. Few scattered sub-4 mm   pulmonary nodules, indeterminate.    Indeterminant left adrenal nodule for which contrast enhanced MRI is   recommended for further evaluation.    DEEPAK MORALES MD; Resident Radiologist  This document has been electronically signed.  HAYLEY DENTON MD; Attending Radiologist  This document has been electronically signed. 2022  9:02AM  ---------------------------------------------------------------------------------------------------------------  EXAM:  DUPLEX SCAN EXT VEINS LOWER BI                          PROCEDURE DATE:  2022      IMPRESSION:  No evidence of deep venous thrombosis in either lower extremity.    TISH VIDAL MD; Attending Radiologist  This document has been electronically signed. Aug  7 2022  1:05PM  --------------------------------------------------------------------------------------------------------------  EXAM:  US ABDOMEN COMPLETE                          PROCEDURE DATE:  2022      FINDINGS:  Liver: Increased echogenicity. Smooth contour.  Bile ducts: Normal caliber. Common bile duct measures 3 mm.  Gallbladder: Mildly distended. No stones. No sludge. Negative near the   sonographic Rivera's sign.  Pancreas: Some degree of atrophy. Limited visualization due to bowel gas.  Spleen: 9.9 cm. Within normal limits.  Right kidney: 9.4cm. Possible mild hydronephrosis including a mildly   dilated renal pelvis.  Left kidney: 11.4 cm. No hydronephrosis. Several small cysts lower pole   cyst measuring 1.1 x 1.1 x 0.9 cm at the lower pole.  Ascites: None.  Aorta and IVC: Aorta with atherosclerotic changes. IVC is patent..    IMPRESSION:    Mildly distended gallbladder. No evidence of acute cholecystitis.    Possible mild hydronephrosis.    SOCORRO CARRION MD; Resident Radiologist  This document has been electronically signed.  KUSUM OSBORNE MD; Attending Radiologist  This document has been electronically signed. Aug  4 2022  4:05PM  ---------------------------------------------------------------------------------------------------------------

## 2022-08-09 NOTE — PROGRESS NOTE ADULT - ASSESSMENT
Patient is an 82 year old female that presented to ED with abnormal MRI reading (discovered by patient's orthopedic), also with gait/balance issues since April, found to have R temporal brain mass c/f metastatic brain lesion now s/p craniotomy 7/27. While pending surgery, AC held, and on 8/1 presented with CP found to have STEMI on EKG. ASA loaded and started on hep gtt 8/1 and transferred to CCU for further management.    ====================  NEUROLOGY    #AMS  - Repeat CTH x5 unchanged from prior  - UA+, completed zosyn     #Brain lesion  - s/p craniotomy for resection of brain mass 7/27, path showing NSC carcinoma (favor adeno)  - c/w brivaracetam   - c/w dexamethasone  - repeat CTH 1 hour post AC resumption unremarkable  - goal -160     #Peripheral neuropathy  - c/w gabapentin    ====================  CARDIOVASCULAR    #CAD s/p PCI  #STEMI  - TTE 7/22 with normal LV internal dimensions with discrete upper septal hypertrophy. EF 55%, inferolateral wall is akinetic.  - c/w lopressor 25 bid  - c/w atorva 40  - EKG 8/1 with STEMI in multiple leads, r/p EKG more pronounced STEMI   - too high risk for cath given recent craniotomy  - s/p ASA load, starting heparin gtt      PE  - cont eliquis    #HTN  - consider decreasing  losartan to 50 as pt is hypotensive    ====================  RESPIRATORY    #Metastatic lung cancer  - s/p bronchoscopy - 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  - no indication for pulmonary medications or antibiotics  - Pulm following, appreciate recs    - Pt tachypneic, borderline HoTN 8/2 c/f PE, CTA chest obtained to r/o PE  - iv heparin for PE  - satting well on RA  - incentive mario    ====================  GI    dysphagia  - passed BMS  - pantoprazole 40 qD  - senna, dulcolax    ====================  ENDOCRINE    #DM  - NPH 6   - moderate dose correctional scale  - Endo following, appreciate recs     ====================  RENAL    - Monitor UOP, lytes    ====================  HEMATOLOGY/ONCOLOGY    #Leukocytosis  - is on dexamethasone,     #Metastatic lung cancer  - Onc following, recs appreciated  - Path +NSCC    #DVT PPx  - c/w ASA, heparin gtt    ====================  INFECTIOUS DISEASES    - completed abx for poss uti x 5 days

## 2022-08-09 NOTE — PROGRESS NOTE ADULT - ASSESSMENT
1) Lung and brain masses,  metastatic process.  Lung bx c/w metastatic NSCLC, adenocarcinoma  - pt s/p nsgy, resection of brain met  patient has also been seen by rad/Onc. Getting steroids and keppra.   Further med onc mgmt as outpt  pathology  sent for foundation testing.  Await full results. PDL1 negative.     2)Leukocytosis- likely reactive/steroid related, UTI, PE, MI, PNA    3) CAD- s/p MI. AFIB mgmt as per mrd, cv    4) PE- ac as per med    5) UTI- mgmt as per med, ID    6) anemia - post-op. monitor cbc

## 2022-08-09 NOTE — PROGRESS NOTE ADULT - ASSESSMENT
81 y/o female (former smoker) with PMHx of HTN, CAD, HLD, MI, peripheral neuropathy, DM and peripheral artery disease presented to the ED for imaging for her head due to abnormal spine MRI by her orthopedist (Dr. Castro) during workup for her spine for her chronic back problems Had gait/balance issues since April . . Work up revealed brai  mass. 04/2022 she has had gait/balance issues & feels drunk once sits up for a while . MR brain 7/20/22 with Right temporal lobe peripherally enhancing, centrally necrotic 3.1 x 2.6 cm mass with surrounding vasogenic edema. 8 mm leftward midline shift. Right uncal herniation. Additional left parafalcine 1.3 x 0.9 cm rim-enhancing cystic lesion with mild surrounding vasogenic edema. Tiny 0.3 x 0.3 cm rim-enhancing lesion within the left frontal cortex concerning for metastatic disease .. 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      s/p Right craniotomy for resection of brain mass 7/27, path showing NSC carcinoma (favor adeno) post op STEMI  8/1, pt transferred to CICU- medical treatment On ASA/ heparin gtt & betablocker . no cath given high risk from comorbidities and recent surgery.    TTE 7/22 with normal LV internal dimensions with discrete upper septal hypertrophy. EF 55%, inferolateral wall is akinetic. 8/3 RML segmental PE   Pt noticed with hyperkalemia. Renal consulted. Received Lokelma 8/7 & 8/8  Off ARB since 8/4/22. NSCU consulted for encephalopathy 8/3/22  likely UTI/early urosepsis, Started on Zosyn. Mental status improved  EEG rule out seizures Right temporal sharp waves. No seizures .  Started on Briviact 8/3/22 New onset Afib w RVR 8/4/22 .Transferred to floor 8/6/22  PAT 150s on 8/7. Troponin remains  elevated.  Increased Metoprolol.     Plan    Neuro stable. Cranial staples discontinues Decadron tapered off Continue Briviact  Cards- Continue Metoprolol 25mg BID , ASA 81mg > Switched to Eliquis BID  Hyperkalemia resolved . Creatinine stable D/w renal   Type 2 DM- BS 200s. Continue Lantus 28U & premeal 10u   DVT/PE- Continue Lovenox CBC stable   Awaiting rehab placement

## 2022-08-09 NOTE — PROGRESS NOTE ADULT - SUBJECTIVE AND OBJECTIVE BOX
Chief complaint  Patient is a 82y old  Female who presents with a chief complaint of abnormal imaging (09 Aug 2022 14:01)   Review of systems  Patient in chair, looks comfortable, no hypoglycemic episodes.    Labs and Fingersticks  CAPILLARY BLOOD GLUCOSE      POCT Blood Glucose.: 221 mg/dL (09 Aug 2022 11:46)  POCT Blood Glucose.: 203 mg/dL (09 Aug 2022 07:25)  POCT Blood Glucose.: 191 mg/dL (08 Aug 2022 21:42)  POCT Blood Glucose.: 148 mg/dL (08 Aug 2022 17:27)      Anion Gap, Serum: 12 (08-09 @ 06:20)  Anion Gap, Serum: 10 (08-08 @ 20:19)  Anion Gap, Serum: 9 (08-08 @ 13:55)  Anion Gap, Serum: 8 (08-08 @ 05:19)  Anion Gap, Serum: 8 (08-07 @ 22:20)  Anion Gap, Serum: 10 (08-07 @ 20:41)      Calcium, Total Serum: 9.0 (08-09 @ 06:20)  Calcium, Total Serum: 9.1 (08-08 @ 20:19)  Calcium, Total Serum: 9.2 (08-08 @ 13:55)  Calcium, Total Serum: 9.0 (08-08 @ 05:19)  Calcium, Total Serum: 8.9 (08-07 @ 22:20)  Calcium, Total Serum: 9.1 (08-07 @ 20:41)  Albumin, Serum: 2.6 *L* (08-07 @ 22:20)    Alanine Aminotransferase (ALT/SGPT): 26 (08-07 @ 22:20)  Alkaline Phosphatase, Serum: 63 (08-07 @ 22:20)  Aspartate Aminotransferase (AST/SGOT): 22 (08-07 @ 22:20)        08-09    138  |  103  |  49<H>  ----------------------------<  209<H>  4.8   |  23  |  1.04    Ca    9.0      09 Aug 2022 06:20    TPro  5.4<L>  /  Alb  2.6<L>  /  TBili  0.2  /  DBili  x   /  AST  22  /  ALT  26  /  AlkPhos  63  08-07                        10.8   13.59 )-----------( 268      ( 09 Aug 2022 06:20 )             33.2     Medications  MEDICATIONS  (STANDING):  apixaban 5 milliGRAM(s) Oral every 12 hours  aspirin  chewable 81 milliGRAM(s) Oral daily  atorvastatin 80 milliGRAM(s) Oral at bedtime  BACItracin   Ointment 1 Application(s) Topical two times a day  brivaracetam  IVPB 100 milliGRAM(s) IV Intermittent two times a day  dextrose 50% Injectable 25 Gram(s) IV Push once  dextrose 50% Injectable 12.5 Gram(s) IV Push once  dextrose 50% Injectable 25 Gram(s) IV Push once  dextrose Oral Gel 15 Gram(s) Oral once  gabapentin 200 milliGRAM(s) Oral at bedtime  glucagon  Injectable 1 milliGRAM(s) IntraMuscular once  insulin glargine Injectable (LANTUS) 28 Unit(s) SubCutaneous at bedtime  insulin lispro (ADMELOG) corrective regimen sliding scale   SubCutaneous every 6 hours  insulin lispro Injectable (ADMELOG) 10 Unit(s) SubCutaneous three times a day before meals  metoprolol tartrate 25 milliGRAM(s) Oral two times a day  pantoprazole  Injectable 40 milliGRAM(s) IV Push every 24 hours  psyllium Powder 1 Packet(s) Enteral Tube every 12 hours  senna 2 Tablet(s) Oral at bedtime      Physical Exam  General: Patient comfortable in bed  Vital Signs Last 12 Hrs  T(F): 97.6 (08-09-22 @ 13:25), Max: 97.9 (08-09-22 @ 04:58)  HR: 62 (08-09-22 @ 13:25) (62 - 80)  BP: 107/62 (08-09-22 @ 13:25) (100/83 - 123/78)  BP(mean): --  RR: 18 (08-09-22 @ 13:25) (18 - 18)  SpO2: 97% (08-09-22 @ 13:25) (97% - 99%)  Neck: No palpable thyroid nodules.  CVS: S1S2, No murmurs  Respiratory: No wheezing, no crepitations  GI: Abdomen soft, bowel sounds positive  Musculoskeletal:  edema lower extremities.   Skin: No skin rashes, no ecchymosis    Diagnostics    Diet, Regular:   Consistent Carbohydrate No Snacks (CSTCHO) (08-08 @ 15:11)  Metanephrine, Plasma: AM Sched. Collection: 21-Jul-2022 06:00 (07-20 @ 10:54)  Aldosterone, Serum: AM Sched. Collection: 21-Jul-2022 06:00 (07-20 @ 10:54)  Cortisol AM, Serum: AM Sched. Collection: 21-Jul-2022 06:00 (07-20 @ 10:54)           Chief complaint  Patient is a 82y old  Female who presents with a chief complaint of abnormal imaging (09 Aug 2022 14:01)   Review of systems  Patient in chair, looks comfortable, no hypoglycemic episodes.    Labs and Fingersticks  CAPILLARY BLOOD GLUCOSE      POCT Blood Glucose.: 221 mg/dL (09 Aug 2022 11:46)  POCT Blood Glucose.: 203 mg/dL (09 Aug 2022 07:25)  POCT Blood Glucose.: 191 mg/dL (08 Aug 2022 21:42)  POCT Blood Glucose.: 148 mg/dL (08 Aug 2022 17:27)      Anion Gap, Serum: 12 (08-09 @ 06:20)  Anion Gap, Serum: 10 (08-08 @ 20:19)  Anion Gap, Serum: 9 (08-08 @ 13:55)  Anion Gap, Serum: 8 (08-08 @ 05:19)  Anion Gap, Serum: 8 (08-07 @ 22:20)  Anion Gap, Serum: 10 (08-07 @ 20:41)      Calcium, Total Serum: 9.0 (08-09 @ 06:20)  Calcium, Total Serum: 9.1 (08-08 @ 20:19)  Calcium, Total Serum: 9.2 (08-08 @ 13:55)  Calcium, Total Serum: 9.0 (08-08 @ 05:19)  Calcium, Total Serum: 8.9 (08-07 @ 22:20)  Calcium, Total Serum: 9.1 (08-07 @ 20:41)  Albumin, Serum: 2.6 *L* (08-07 @ 22:20)    Alanine Aminotransferase (ALT/SGPT): 26 (08-07 @ 22:20)  Alkaline Phosphatase, Serum: 63 (08-07 @ 22:20)  Aspartate Aminotransferase (AST/SGOT): 22 (08-07 @ 22:20)        08-09    138  |  103  |  49<H>  ----------------------------<  209<H>  4.8   |  23  |  1.04    Ca    9.0      09 Aug 2022 06:20    TPro  5.4<L>  /  Alb  2.6<L>  /  TBili  0.2  /  DBili  x   /  AST  22  /  ALT  26  /  AlkPhos  63  08-07                        10.8   13.59 )-----------( 268      ( 09 Aug 2022 06:20 )             33.2     Medications  MEDICATIONS  (STANDING):  apixaban 5 milliGRAM(s) Oral every 12 hours  aspirin  chewable 81 milliGRAM(s) Oral daily  atorvastatin 80 milliGRAM(s) Oral at bedtime  BACItracin   Ointment 1 Application(s) Topical two times a day  brivaracetam  IVPB 100 milliGRAM(s) IV Intermittent two times a day  dextrose 50% Injectable 25 Gram(s) IV Push once  dextrose 50% Injectable 12.5 Gram(s) IV Push once  dextrose 50% Injectable 25 Gram(s) IV Push once  dextrose Oral Gel 15 Gram(s) Oral once  gabapentin 200 milliGRAM(s) Oral at bedtime  glucagon  Injectable 1 milliGRAM(s) IntraMuscular once  insulin glargine Injectable (LANTUS) 28 Unit(s) SubCutaneous at bedtime  insulin lispro (ADMELOG) corrective regimen sliding scale   SubCutaneous every 6 hours  insulin lispro Injectable (ADMELOG) 10 Unit(s) SubCutaneous three times a day before meals  metoprolol tartrate 25 milliGRAM(s) Oral two times a day  pantoprazole  Injectable 40 milliGRAM(s) IV Push every 24 hours  psyllium Powder 1 Packet(s) Enteral Tube every 12 hours  senna 2 Tablet(s) Oral at bedtime      Physical Exam  General: Patient comfortable in bed  Vital Signs Last 12 Hrs  T(F): 97.6 (08-09-22 @ 13:25), Max: 97.9 (08-09-22 @ 04:58)  HR: 62 (08-09-22 @ 13:25) (62 - 80)  BP: 107/62 (08-09-22 @ 13:25) (100/83 - 123/78)  BP(mean): --  RR: 18 (08-09-22 @ 13:25) (18 - 18)  SpO2: 97% (08-09-22 @ 13:25) (97% - 99%)  Neck: No palpable thyroid nodules.  CVS: S1S2, No murmurs  Respiratory: No wheezing, no crepitations  GI: Abdomen soft, bowel sounds positive  Musculoskeletal:  edema lower extremities.   Skin: No skin rashes, no ecchymosis    Diagnostics    Diet, Regular:   Consistent Carbohydrate No Snacks (CSTCHO) (08-08 @ 15:11)  Metanephrine, Plasma: AM Sched. Collection: 21-Jul-2022 06:00 (07-20 @ 10:54)  Aldosterone, Serum: AM Sched. Collection: 21-Jul-2022 06:00 (07-20 @ 10:54)  Cortisol AM, Serum: AM Sched. Collection: 21-Jul-2022 06:00 (07-20 @ 10:54)

## 2022-08-09 NOTE — PROGRESS NOTE ADULT - ASSESSMENT
ASSESSMENT:    82 year old gentlewoman, former smoker, without history of intrinsic lung disease. She has a history of HTN, HLD, DM, CAD s/p PCI (1990s and 2021) and PAD s/p RLE stenting. The patient has been having difficulty ambulating over the last several months due to a sense of imbalance with impaired gait. Her family has noted a change in her personality with pressured speech and insomnia. Outpatient cervical MRI incidentally note a CNS lesion. Lumbar spine MRI performed due to chronic back pain revealed multilevel foraminal stenosis/disc bulges. 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 The patient has no shortness of breath or hypoxemia on room air. She has no cough, sputum production, chest congestion or wheeze. No fevers, chills or sweats. No chest pain/pressure or palpitations.     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 presenting with several months of ataxia - frontal lobe lesions have lead to decreased inhibition as well as to pressured and loquacious speech    8/2 - chest pain last night -> EKG c/w STEMI - cardiac enzymes are elevated -> catheterization not performed due to recent neurosurgery -> started on ASA and heparin gtt -> now quite lethargic although awakes to state her name and the name of her children    8/4 - much more awake and alert -> A & O x 3 -> very slow speech; remains in the CCU -> EKG c/w STEMI - cardiac enzymes are elevated -> catheterization not performed due to recent neurosurgery -> continues on ASA and heparin gtt without a change in brain CT -> troponin level continues to increase; no shortness of breath or hypoxemia on a 2lpm nasal canula    8/5 - much more awake and alert -> A & O x 3 -> slow speech and talking as if intoxicated; transferred to the surgical ICU; continues on ASA and heparin gtt without a change in brain CT following a NSTEMI; no shortness of breath or hypoxemia on room air; no seizures on EEG; receiving NGT feeds due to dysphagia    8/8 - looks well  on the neurology floor; awake and alert chatting with her family while sitting in the chair - speech is now fluent; no shortness of breath or hypoxemia on room air; no cough, sputum production, hemoptysis, chest congestion or wheeze; no fevers, chills or sweats; no chest pain/pressure or palpitations; s/p bronchoscopy with endobronchial brushings/biopsies of an obstructing left upper lobe endobronchial lesion -> 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; no seizures on EEG; NGT is out and the patient is enjoying a regular diet      PLAN/RECOMMENDATIONS:    stable oxygenation on room air  mental status has returned to normal following a course of zosyn for E coli and Klebsiella UTI and possible post-obstructive pneumonia - MRSA nasal swab is negative -> vancomycin discontinued  CTA -> pulmonary embolism in the lateral segmental branch of the right middle lobe pulmonary artery without evidence of right heart strain - multiple segmental branches in the left lung are obscured by motion - 1.2 cm left hilar lymph node - patent central airways - left upper lobe mass decreased in size - obstruction of the left apical posterior bronchus with associated segmental atelectasis along the mediastinum  spirometry    FEV1 - 1.81 liters - 88% predicted    FVC - 2.48 liters - 90% predicted    FEV1% - 73       c/w normal spirometry  patient is already on heparin gtt for ACS which will treat the pulmonary embolism -> would switch to a DOAC   s/p bronchoscopy -> 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 - foundation studies have been ordered  cardiology evaluation noted     STEMI - not a candidate for percutaneous coronary intervention given recent craniotomy limiting anti-platelet agents    medical management including ASA/lipitor/metoprolol/eliquis    repeat ECHO 8/8 ->  mild segmental left ventricular systolic dysfunction - no left ventricular thrombus - the basal  inferolateral wall, the basal inferior wall, the basal anterolateral wall and the mid inferior wall are hypokinetic  neurosurgery evaluation noted    s/p right craniotomy for tumor resection    repeat CT scan 8/2  -> right temporal craniotomy - minimally decreased right frontal pneumocephalus - similar subdural hemorrhage along the right lateral convexity measuring up to 7 to 8 mm in greatest depth anteriorly - extensive edema surrounding the right temporal surgical bed - leftward midline shift of 6 to 7 mm - partial effacement of the suprasellar and right perimesencephalic cisterns - mild asymmetric dilatation of the left lateral ventricle - low density lesion with mild surrounding edema in the left parasagittal frontal lobe.    prophylactic anticonvulsants -> brivaracetam - no seizures on EEG    decadron 1mg q12h IV for cerebral edema -> glucose control on steroids    incentive spirometry as able    regular diet    GI/DVT prophylaxis - protonix/eliquis  bowel regimen  physical therapy    Will follow with you. Plan of care discussed with the patient and her family at bedside. Discharge planning    Satish Hopkins MD, [facility]  [phone]  Pulmonary Medicine

## 2022-08-09 NOTE — PROGRESS NOTE ADULT - SUBJECTIVE AND OBJECTIVE BOX
no new complaints, on [address] Martin     REVIEW OF SYSTEMS  Constitutional - No fever,  No fatigue  HEENT - No vertigo, No neck pain  Neurological - No headaches, No memory loss, No loss of strength, No numbness, No tremors  Skin - No rashes, No lesions   Musculoskeletal - No joint pain, No joint swelling, No muscle pain  Psychiatric - No depression, No anxiety    FUNCTIONAL PROGRESS  8/8 SLP/FEES  regular texture diet    8/6 PT  transfers mod assist with RW  gait mod assist with RW x 15 feet    8/8 OT  follows 75% simple commands   LB dressing max assist     VITALS  T(C): 36.5 (08-09-22 @ 10:39), Max: 37.2 (08-08-22 @ 13:14)  HR: 64 (08-09-22 @ 10:39) (62 - 80)  BP: 123/78 (08-09-22 @ 10:39) (100/83 - 123/78)  RR: 18 (08-09-22 @ 10:39) (18 - 18)  SpO2: 98% (08-09-22 @ 10:39) (96% - 99%)  Wt(kg): --    MEDICATIONS   apixaban 5 milliGRAM(s) every 12 hours  aspirin  chewable 81 milliGRAM(s) daily  atorvastatin 80 milliGRAM(s) at bedtime  BACItracin   Ointment 1 Application(s) two times a day  brivaracetam  IVPB 100 milliGRAM(s) two times a day  dexAMETHasone  Injectable 1 milliGRAM(s) every 12 hours  dextrose 50% Injectable 25 Gram(s) once  dextrose 50% Injectable 12.5 Gram(s) once  dextrose 50% Injectable 25 Gram(s) once  dextrose Oral Gel 15 Gram(s) once  gabapentin 200 milliGRAM(s) at bedtime  glucagon  Injectable 1 milliGRAM(s) once  insulin glargine Injectable (LANTUS) 24 Unit(s) at bedtime  insulin lispro (ADMELOG) corrective regimen sliding scale   every 6 hours  insulin lispro Injectable (ADMELOG) 8 Unit(s) three times a day before meals  melatonin 5 milliGRAM(s) at bedtime PRN  metoprolol tartrate 25 milliGRAM(s) two times a day  pantoprazole  Injectable 40 milliGRAM(s) every 24 hours  psyllium Powder 1 Packet(s) every 12 hours  senna 2 Tablet(s) at bedtime      RECENT LABS - Reviewed                        10.8   13.59 )-----------( 268      ( 09 Aug 2022 06:20 )             33.2     08-09    138  |  103  |  49<H>  ----------------------------<  209<H>  4.8   |  23  |  1.04    Ca    9.0      09 Aug 2022 06:20    TPro  5.4<L>  /  Alb  2.6<L>  /  TBili  0.2  /  DBili  x   /  AST  22  /  ALT  26  /  AlkPhos  63  08-07    PTT - ( 09 Aug 2022 06:20 )  PTT:65.9 sec    < from: CT Head No Cont (08.03.22 @ 04:38) >    IMPRESSION:    Redemonstration of right temporal craniotomy for prior resection of a   lesion. There is a large degree of hypoattenuation throughout the right   temporal lobe and extending into the right basal ganglia and right   parietal lobe, suggestive of a combination of vasogenic edema and/or   encephalomalacia/gliosis. This results in secondary mass effect upon the   parenchyma of the right cerebral hemisphere, not significantly changed.   There is mild mass effect on the ventricular system, right greater than   left. There is secondary right to left midline shift of approximately 5.7   mm. Expected postsurgical extra-axial pneumocephalus is reidentified   without significant change. Tiny expected subdural hematoma overlying the   right anterior superior frontal lobe is reidentified without change.    Reidentified is a hypoattenuating lesion within the left medial frontal   lobe measuring 1.4 x 1.6 cm, compatible with known additional metastasis.   No significant surrounding edema    < end of copied text >    -------------------------------------------------------------------------------  PHYSICAL EXAM  Constitutional - NAD, Comfortable, laying in bed   Chest - Breathing comfortably  Cardiovascular - S1S2   Extremities - No C/C/E, No calf tenderness   Neurologic Exam -    follows commands                 Cognitive - Awake, Alert, AAO to self, place, date, year, situation     Communication - Fluent, No dysarthria     Cranial Nerves - CN 2-12 intact     Motor - No focal deficits                    LEFT    UE - ShAB 5/5, EF 5/5, EE 5/5, WE 5/5,  5/5                    RIGHT UE - ShAB 5/5, EF 5/5, EE 5/5, WE 5/5,  5/5                    LEFT    LE - HF 5/5, KE 5/5, DF 5/5, PF 5/5                    RIGHT LE - HF 5/5, KE 5/5, DF 5/5, PF 5/5        Sensory - Intact to LT     Psychiatric - Mood stable, Affect WNL  ----------------------------------------------------------------------------------------  ASSESSMENT/PLAN  82yFemale h/o HTN, CAD, HLD, MI, peripheral neuropathy, DM, smoker, PAD with functional deficits after craniotomy for brain mass, tumor resection 7/27, post op STEMI, PE   pathology: NSC carcinoma  decadron, brivaracetam for seizures  NSCL, mets to brain, seen by [facility], outpatient follow up   dysphagia, s/p FEES, regular diet   Pain - Tylenol, gabapentin, oxycodone prn   heparin, transition to eliquis   Rehab - Will continue to follow for ongoing rehab needs and recommendations.   continue bedside therapy  out of bed to chair daily    Recommend ACUTE inpatient rehabilitation for the functional deficits consisting of 3 hours of therapy/day & 24 hour RN/daily PMR physician for comorbid medical management. Patient will be able to tolerate 3 hours a day.

## 2022-08-09 NOTE — PROGRESS NOTE ADULT - SUBJECTIVE AND OBJECTIVE BOX
CC: f/u for septic event    Patient reports she is feeling    REVIEW OF SYSTEMS:  All other review of systems negative (Comprehensive ROS)    Antimicrobials Day #  :    Other Medications Reviewed    T(F): 97.6 (08-09-22 @ 13:25), Max: 98.4 (08-08-22 @ 21:12)  HR: 74 (08-09-22 @ 18:07)  BP: 124/78 (08-09-22 @ 18:07)  RR: 18 (08-09-22 @ 18:07)  SpO2: 97% (08-09-22 @ 13:25)  Wt(kg): --    PHYSICAL EXAM:  General: alert, no acute distress  Eyes:  anicteric, no conjunctival injection, no discharge  Oropharynx: no lesions or injection 	  Neck: supple, without adenopathy  Lungs: course  to auscultation  Heart: regular rate and rhythm; no murmur, rubs or gallops  Abdomen: soft, nondistended, nontender, without mass or organomegaly  Skin: no lesions  Extremities: no clubbing, cyanosis, or edema  Neurologic: alert, , moves all extremities  forehead with scab  LAB RESULTS:                        10.8   13.59 )-----------( 268      ( 09 Aug 2022 06:20 )             33.2     08-09    138  |  103  |  49<H>  ----------------------------<  209<H>  4.8   |  23  |  1.04    Ca    9.0      09 Aug 2022 06:20    TPro  5.4<L>  /  Alb  2.6<L>  /  TBili  0.2  /  DBili  x   /  AST  22  /  ALT  26  /  AlkPhos  63  08-07    LIVER FUNCTIONS - ( 07 Aug 2022 22:20 )  Alb: 2.6 g/dL / Pro: 5.4 g/dL / ALK PHOS: 63 U/L / ALT: 26 U/L / AST: 22 U/L / GGT: x             MICROBIOLOGY:  RECENT CULTURES:      RADIOLOGY REVIEWED:  < from: [facility] Duplex Lower Ext Vein Scan, Bilat (08.07.22 @ 12:59) >    ACC: [id number] EXAM:  DUPLEX SCAN EXT VEINS LOWER BI                          PROCEDURE DATE:  08/07/2022          INTERPRETATION:  CLINICAL INFORMATION: Prolonged bedrest.    COMPARISON: Venous Doppler dated 07/28/2022.    TECHNIQUE: Duplex sonography of the BILATERAL LOWER extremity veins with   color and spectral Doppler, with and without compression.    FINDINGS:    RIGHT:  Normal compressibility of the RIGHT common femoral, femoral and popliteal   veins.  Doppler examination shows normal spontaneous and phasic flow.  No RIGHT calf vein thrombosis is detected.    LEFT:  Normal compressibility of the LEFT common femoral, femoral and popliteal   veins.  Doppler examination shows normal spontaneous and phasic flow.  No LEFT calf vein thrombosis is detected.    IMPRESSION:  No evidence of deep venous thrombosis in either lower extremity.      Assessment:  Patient s/p new dx lung ca, brain mets, s/p lung bx and craniotomy for brain met, had post op MI, had episode of lethargy, seemed septic, antibiotics commenced but no infection found. She  remains stable off antibiotics.   Plan:  monitor off antibiotics\  supportive care CC: f/u for septic event    Patient reports she is feeling fine, remembers meeting me last week and the green dress I had been wearing    REVIEW OF SYSTEMS:  All other review of systems negative (Comprehensive ROS)    Antimicrobials Day #  :    Other Medications Reviewed    T(F): 97.6 (08-09-22 @ 13:25), Max: 98.4 (08-08-22 @ 21:12)  HR: 74 (08-09-22 @ 18:07)  BP: 124/78 (08-09-22 @ 18:07)  RR: 18 (08-09-22 @ 18:07)  SpO2: 97% (08-09-22 @ 13:25)  Wt(kg): --    PHYSICAL EXAM:  General: alert, no acute distress, surgical wound on head clean,   Eyes:  anicteric, no conjunctival injection, no discharge  Oropharynx: no lesions or injection 	  Neck: supple, without adenopathy  Lungs: course  to auscultation  Heart: regular rate and rhythm; no murmur, rubs or gallops  Abdomen: soft, nondistended, nontender, without mass or organomegaly  Skin: no lesions  Extremities: no clubbing, cyanosis, or edema  Neurologic: alert, , moves all extremities  forehead with scab  LAB RESULTS:                        10.8   13.59 )-----------( 268      ( 09 Aug 2022 06:20 )             33.2     08-09    138  |  103  |  49<H>  ----------------------------<  209<H>  4.8   |  23  |  1.04    Ca    9.0      09 Aug 2022 06:20    TPro  5.4<L>  /  Alb  2.6<L>  /  TBili  0.2  /  DBili  x   /  AST  22  /  ALT  26  /  AlkPhos  63  08-07    LIVER FUNCTIONS - ( 07 Aug 2022 22:20 )  Alb: 2.6 g/dL / Pro: 5.4 g/dL / ALK PHOS: 63 U/L / ALT: 26 U/L / AST: 22 U/L / GGT: x             MICROBIOLOGY:  RECENT CULTURES:      RADIOLOGY REVIEWED:  < from: [facility] Duplex Lower Ext Vein Scan, Bilat (08.07.22 @ 12:59) >    ACC: [id number] EXAM:  DUPLEX SCAN EXT VEINS LOWER BI                          PROCEDURE DATE:  08/07/2022          INTERPRETATION:  CLINICAL INFORMATION: Prolonged bedrest.    COMPARISON: Venous Doppler dated 07/28/2022.    TECHNIQUE: Duplex sonography of the BILATERAL LOWER extremity veins with   color and spectral Doppler, with and without compression.    FINDINGS:    RIGHT:  Normal compressibility of the RIGHT common femoral, femoral and popliteal   veins.  Doppler examination shows normal spontaneous and phasic flow.  No RIGHT calf vein thrombosis is detected.    LEFT:  Normal compressibility of the LEFT common femoral, femoral and popliteal   veins.  Doppler examination shows normal spontaneous and phasic flow.  No LEFT calf vein thrombosis is detected.    IMPRESSION:  No evidence of deep venous thrombosis in either lower extremity.      Assessment:  Patient s/p new dx lung ca, brain mets, s/p lung bx and craniotomy for brain met, had post op MI, had episode of lethargy, seemed septic, antibiotics commenced , had 5 d of zosyn for a possible uti.  She  remains stable off antibiotics. Suspect mild leukocytosis is just reactive  Plan:  monitor off antibiotics\  supportive care

## 2022-08-09 NOTE — PROGRESS NOTE ADULT - ASSESSMENT
[facility] 6/23/21:   s/p successful angioplasty to the mid RCA followed by intracoronary brachytherapy.  [facility] 4/14/21: POBA of mid RCA (80%)   [facility] 3/25/21: PCI/POBA to pCX 90%   Echo 7/22/22: Normal left ventricular internal dimensions with discrete upper septal hypertrophy. Estimated ejection fraction 55%, The inferolateral wall is akinetic.  Echo 8/2/22:  grossly, preserved left ventricular systolic function with segmental wall motion abnormalities. EF approximately 55%. The mid to distal inferolateral and mid to distal inferior segments are hypkinetic. The apex is akinetic. No left ventricular thrombus seen.  limited Echo 8/8/22: EF 60-65%;  Mild segmental left ventricular systolic dysfunction; No left ventricular thrombus. The basal inferolateral wall, the basal inferior wall, the basal  anterolateral wall, and the mid inferior wall are hypokinetic.    a/p  83 y/o female (former smoker) with PMHx of HTN, CAD sp PCi,  HLD, MI, peripheral neuropathy, DM and peripheral artery disease presented with abnl imaging on MRI as outpt      #Brain/ Lung  mass   -MRI brain showed 2 mets R temporal 4.7x3.7x3cm L frontal 1.4x1.5x1.2cm   -s/p bronchoscopy  -s/p Right craniotomy for tumor  -management per neuro-- started on antiplt/ hep as mentioned below- repeat CT head  8/3 noted -neurosx to fu   -s/p eeg fu per neuro      #STEMI   -8/1 co cp-  found to have an inferior STEMI  -per cath lab  attending -Not a PCI candidate given potential benefits would be outweighed by the risks of the burden of anticoagulation and antiplatelet agents required for therapy  due to recent brain surgery  -on asa,  sp hep gtt -- now on eliquis    -limited ( 8/2 )echo grossly, preserved left ventricular systolic function with segmental wall motion abnormalities. EF approximately 55%. The mid to distal inferolateral and mid to distal inferior segments are hypkinetic. The apex is akinetic. No left ventricular thrombus seen.  -repeat limited echo (8/8) with preserved left ventricular systolic function with segmental wall motion abnormalities. EF 60-65%;  The basal inferolateral wall, the basal inferior wall, the basal  anterolateral wall, and the mid inferior wall are hypokinetic.  -c/w statin ,  bb,  -hyperkalemia improved-- CAN continue to HOLD ARB for now   -monitor ct head - neuro sx following     #acute PE   -sp hep gtt - now on eliquis     #new PAFIB   -now in NSR   -c.w BB   -ac as mentioned above     #CAD sp PCI   -antiplt/ac as mentioned above  -echo w inflat akinesis and overall preserved EF  -repeat limited echo as mentioned above   -c/w BB     #HTN  -cont current meds

## 2022-08-09 NOTE — PROGRESS NOTE ADULT - SUBJECTIVE AND OBJECTIVE BOX
SUBJECTIVE:   Patient seen & examined   OVERNIGHT EVENTS: none    Vital Signs Last 24 Hrs  T(C): 36.4 (09 Aug 2022 13:25), Max: 36.9 (08 Aug 2022 21:12)  T(F): 97.6 (09 Aug 2022 13:25), Max: 98.4 (08 Aug 2022 21:12)  HR: 62 (09 Aug 2022 13:25) (62 - 80)  BP: 107/62 (09 Aug 2022 13:25) (100/83 - 123/78)  BP(mean): --  RR: 18 (09 Aug 2022 13:25) (18 - 18)  SpO2: 97% (09 Aug 2022 13:25) (97% - 99%)    Parameters below as of 09 Aug 2022 13:25  Patient On (Oxygen Delivery Method): room air        PHYSICAL EXAM:      Neurological: Awake, alert oriented to person, place and time, Following Commands, Face Symmetrical, Speech Fluent, Moving all extremities spont/ag/purposeful, No Drift, Sensation to Light Touch Intact    Pulmonary: Clear to Auscultation, No Rales, No Rhonchi, No Wheezes     Cardiovascular: S1, S2, Regular Rate and Rhythm     Gastrointestinal: Soft, Nontender, Nondistended     Incision: cranial staples c/d/i. Forehead pressure injury open blister area with granulation tissue     LABS:                        10.8   13.59 )-----------( 268      ( 09 Aug 2022 06:20 )             33.2    08-09    138  |  103  |  49<H>  ----------------------------<  209<H>  4.8   |  23  |  1.04    Ca    9.0      09 Aug 2022 06:20    TPro  5.4<L>  /  Alb  2.6<L>  /  TBili  0.2  /  DBili  x   /  AST  22  /  ALT  26  /  AlkPhos  63  08-07  PTT:65.9 sec        IMAGING:         MEDICATIONS:    brivaracetam  IVPB 100 milliGRAM(s) IV Intermittent two times a day  gabapentin 200 milliGRAM(s) Oral at bedtime  melatonin 5 milliGRAM(s) Oral at bedtime PRN Sleep  metoprolol tartrate 25 milliGRAM(s) Oral two times a day  pantoprazole  Injectable 40 milliGRAM(s) IV Push every 24 hours  psyllium Powder 1 Packet(s) Enteral Tube every 12 hours  senna 2 Tablet(s) Oral at bedtime  apixaban 5 milliGRAM(s) Oral every 12 hours  aspirin  chewable 81 milliGRAM(s) Oral daily  atorvastatin 80 milliGRAM(s) Oral at bedtime  BACItracin   Ointment 1 Application(s) Topical two times a day  dexAMETHasone  Injectable 1 milliGRAM(s) IV Push every 12 hours  glucagon  Injectable 1 milliGRAM(s) IntraMuscular once  insulin glargine Injectable (LANTUS) 28 Unit(s) SubCutaneous at bedtime  insulin lispro (ADMELOG) corrective regimen sliding scale   SubCutaneous every 6 hours  insulin lispro Injectable (ADMELOG) 10 Unit(s) SubCutaneous three times a day before meals      DIET:

## 2022-08-09 NOTE — PROGRESS NOTE ADULT - PROBLEM SELECTOR PLAN 1
Will increase Lantus to 28u at bedtime.  Will increase Admelog to 10u before each meal and continue Admelog correction scale coverage. Will continue monitoring FS and FU.  Suggest DC to rehab on current basal bolus insulin, discontinue prior Glimepiride. FU 4 weeks.    for compliance with consistent low carb diet.

## 2022-08-09 NOTE — PROGRESS NOTE ADULT - SUBJECTIVE AND OBJECTIVE BOX
LANIE BERTRAND  MRN-[id number]    Patient is a 82y old  Female who presents with a chief complaint of abnormal imaging (08 Aug 2022 16:18)      Review of System  REVIEW OF SYSTEMS      General:	Denies fatigue, fevers, chills, sweats, decreased appetite.    Skin/Breast: denies pruritis, rash  	  Ophthalmologic: no change in vision or blurring  	  HEENT	Denies dry mouth, oral sores, dysphagia,  change in hearing.    Respiratory and Thorax:  cough, sob, wheeze, hemoptysis  	  Cardiovascular:	no cp , palp, orthopnea    Gastrointestinal:	no n/v/d constipation    Genitourinary:	no dysuria of frequency, no hematuria, no flank pain    Musculoskeletal:	no bone or joint pain. no muscle aches.     Neurological:	no change in sensory or motor function. no headache. no weakness.     Psychiatric:	no depression, no anxiety, insomnia.     Hematology/Lymphatics:	no bleeding or bruising        Current Meds  MEDICATIONS  (STANDING):  aspirin  chewable 81 milliGRAM(s) Oral daily  atorvastatin 80 milliGRAM(s) Oral at bedtime  BACItracin   Ointment 1 Application(s) Topical two times a day  brivaracetam  IVPB 100 milliGRAM(s) IV Intermittent two times a day  dexAMETHasone  Injectable 1 milliGRAM(s) IV Push every 12 hours  dextrose 50% Injectable 25 Gram(s) IV Push once  dextrose 50% Injectable 25 Gram(s) IV Push once  dextrose 50% Injectable 12.5 Gram(s) IV Push once  dextrose Oral Gel 15 Gram(s) Oral once  gabapentin 200 milliGRAM(s) Oral at bedtime  glucagon  Injectable 1 milliGRAM(s) IntraMuscular once  heparin  Infusion 750 Unit(s)/Hr (7.5 mL/Hr) IV Continuous <Continuous>  insulin glargine Injectable (LANTUS) 24 Unit(s) SubCutaneous at bedtime  insulin lispro (ADMELOG) corrective regimen sliding scale   SubCutaneous every 6 hours  insulin lispro Injectable (ADMELOG) 8 Unit(s) SubCutaneous three times a day before meals  metoprolol tartrate 25 milliGRAM(s) Oral two times a day  pantoprazole  Injectable 40 milliGRAM(s) IV Push every 24 hours  psyllium Powder 1 Packet(s) Enteral Tube every 12 hours  senna 2 Tablet(s) Oral at bedtime    MEDICATIONS  (PRN):  melatonin 5 milliGRAM(s) Oral at bedtime PRN Sleep      Vitals  Vital Signs Last 24 Hrs  T(C): 36.6 (09 Aug 2022 04:58), Max: 37.2 (08 Aug 2022 13:14)  T(F): 97.9 (09 Aug 2022 04:58), Max: 98.9 (08 Aug 2022 13:14)  HR: 80 (09 Aug 2022 04:58) (62 - 80)  BP: 100/83 (09 Aug 2022 04:58) (100/83 - 122/79)  BP(mean): --  RR: 18 (09 Aug 2022 04:58) (18 - 18)  SpO2: 99% (09 Aug 2022 04:58) (94% - 99%)    Parameters below as of 09 Aug 2022 04:58  Patient On (Oxygen Delivery Method): room air        Physical Exam  PHYSICAL EXAM:      Constitutional: NAD    Eyes: PERRLA EOMI, anicteric sclera    Heent :No oral sores, no pharyngeal injection. moist mucosa.    Neck: supple, no jvd, no LAD    Respiratory: CTA b/l     Cardiovascular: s1s2, no m/g/r    Gastrointestinal: soft, nt, nd, + BS    Extremities: no c/c/e    Neurological:A&O x 3 moves all ext.    Skin: no rash on exposed skin    Lymph Nodes: no lymphadenopathy.              Lab  CBC Full  -  ( 09 Aug 2022 06:20 )  WBC Count : 13.59 K/uL  RBC Count : 3.45 M/uL  Hemoglobin : 10.8 g/dL  Hematocrit : 33.2 %  Platelet Count - Automated : 268 K/uL  Mean Cell Volume : 96.2 fl  Mean Cell Hemoglobin : 31.3 pg  Mean Cell Hemoglobin Concentration : 32.5 gm/dL  Auto Neutrophil # : x  Auto Lymphocyte # : x  Auto Monocyte # : x  Auto Eosinophil # : x  Auto Basophil # : x  Auto Neutrophil % : x  Auto Lymphocyte % : x  Auto Monocyte % : x  Auto Eosinophil % : x  Auto Basophil % : x    08-09    138  |  103  |  49<H>  ----------------------------<  209<H>  4.8   |  23  |  1.04    Ca    9.0      09 Aug 2022 06:20    TPro  5.4<L>  /  Alb  2.6<L>  /  TBili  0.2  /  DBili  x   /  AST  22  /  ALT  26  /  AlkPhos  63  08-07    PTT - ( 09 Aug 2022 06:20 )  PTT:65.9 sec    Rad:    Assessment/Plan   LANIE BERTRAND  MRN-[id number]    Patient is a 82y old  Female who presents with a chief complaint of abnormal imaging (08 Aug 2022 16:18)      Review of System    Resting comfortably    Current Meds  MEDICATIONS  (STANDING):  aspirin  chewable 81 milliGRAM(s) Oral daily  atorvastatin 80 milliGRAM(s) Oral at bedtime  BACItracin   Ointment 1 Application(s) Topical two times a day  brivaracetam  IVPB 100 milliGRAM(s) IV Intermittent two times a day  dexAMETHasone  Injectable 1 milliGRAM(s) IV Push every 12 hours  dextrose 50% Injectable 25 Gram(s) IV Push once  dextrose 50% Injectable 25 Gram(s) IV Push once  dextrose 50% Injectable 12.5 Gram(s) IV Push once  dextrose Oral Gel 15 Gram(s) Oral once  gabapentin 200 milliGRAM(s) Oral at bedtime  glucagon  Injectable 1 milliGRAM(s) IntraMuscular once  heparin  Infusion 750 Unit(s)/Hr (7.5 mL/Hr) IV Continuous <Continuous>  insulin glargine Injectable (LANTUS) 24 Unit(s) SubCutaneous at bedtime  insulin lispro (ADMELOG) corrective regimen sliding scale   SubCutaneous every 6 hours  insulin lispro Injectable (ADMELOG) 8 Unit(s) SubCutaneous three times a day before meals  metoprolol tartrate 25 milliGRAM(s) Oral two times a day  pantoprazole  Injectable 40 milliGRAM(s) IV Push every 24 hours  psyllium Powder 1 Packet(s) Enteral Tube every 12 hours  senna 2 Tablet(s) Oral at bedtime    MEDICATIONS  (PRN):  melatonin 5 milliGRAM(s) Oral at bedtime PRN Sleep      Vitals  Vital Signs Last 24 Hrs  T(C): 36.6 (09 Aug 2022 04:58), Max: 37.2 (08 Aug 2022 13:14)  T(F): 97.9 (09 Aug 2022 04:58), Max: 98.9 (08 Aug 2022 13:14)  HR: 80 (09 Aug 2022 04:58) (62 - 80)  BP: 100/83 (09 Aug 2022 04:58) (100/83 - 122/79)  BP(mean): --  RR: 18 (09 Aug 2022 04:58) (18 - 18)  SpO2: 99% (09 Aug 2022 04:58) (94% - 99%)    Parameters below as of 09 Aug 2022 04:58  Patient On (Oxygen Delivery Method): room air    Physical Exam    NAD    Lab  CBC Full  -  ( 09 Aug 2022 06:20 )  WBC Count : 13.59 K/uL  RBC Count : 3.45 M/uL  Hemoglobin : 10.8 g/dL  Hematocrit : 33.2 %  Platelet Count - Automated : 268 K/uL  Mean Cell Volume : 96.2 fl  Mean Cell Hemoglobin : 31.3 pg  Mean Cell Hemoglobin Concentration : 32.5 gm/dL  Auto Neutrophil # : x  Auto Lymphocyte # : x  Auto Monocyte # : x  Auto Eosinophil # : x  Auto Basophil # : x  Auto Neutrophil % : x  Auto Lymphocyte % : x  Auto Monocyte % : x  Auto Eosinophil % : x  Auto Basophil % : x    08-09    138  |  103  |  49<H>  ----------------------------<  209<H>  4.8   |  23  |  1.04    Ca    9.0      09 Aug 2022 06:20    TPro  5.4<L>  /  Alb  2.6<L>  /  TBili  0.2  /  DBili  x   /  AST  22  /  ALT  26  /  AlkPhos  63  08-07    PTT - ( 09 Aug 2022 06:20 )  PTT:65.9 sec    Rad:    Assessment/Plan

## 2022-08-10 ENCOUNTER — INPATIENT (INPATIENT)
Facility: HOSPITAL | Age: 82
LOS: 15 days | Discharge: HOME HEALTH | DRG: 949 | End: 2022-08-26
Attending: PHYSICAL MEDICINE & REHABILITATION | Admitting: PHYSICAL MEDICINE & REHABILITATION
Payer: MEDICARE

## 2022-08-10 VITALS
RESPIRATION RATE: 16 BRPM | HEIGHT: 65 IN | OXYGEN SATURATION: 96 % | SYSTOLIC BLOOD PRESSURE: 102 MMHG | DIASTOLIC BLOOD PRESSURE: 65 MMHG | WEIGHT: 140.65 LBS | TEMPERATURE: 97 F | HEART RATE: 73 BPM

## 2022-08-10 VITALS
DIASTOLIC BLOOD PRESSURE: 64 MMHG | HEART RATE: 84 BPM | OXYGEN SATURATION: 97 % | SYSTOLIC BLOOD PRESSURE: 111 MMHG | TEMPERATURE: 98 F | RESPIRATION RATE: 18 BRPM

## 2022-08-10 DIAGNOSIS — Z79.4 LONG TERM (CURRENT) USE OF INSULIN: ICD-10-CM

## 2022-08-10 DIAGNOSIS — I10 ESSENTIAL (PRIMARY) HYPERTENSION: ICD-10-CM

## 2022-08-10 DIAGNOSIS — E86.0 DEHYDRATION: ICD-10-CM

## 2022-08-10 DIAGNOSIS — D72.829 ELEVATED WHITE BLOOD CELL COUNT, UNSPECIFIED: ICD-10-CM

## 2022-08-10 DIAGNOSIS — E43 UNSPECIFIED SEVERE PROTEIN-CALORIE MALNUTRITION: ICD-10-CM

## 2022-08-10 DIAGNOSIS — R29.810 FACIAL WEAKNESS: ICD-10-CM

## 2022-08-10 DIAGNOSIS — L89.621 PRESSURE ULCER OF LEFT HEEL, STAGE 1: ICD-10-CM

## 2022-08-10 DIAGNOSIS — E11.42 TYPE 2 DIABETES MELLITUS WITH DIABETIC POLYNEUROPATHY: ICD-10-CM

## 2022-08-10 DIAGNOSIS — R35.1 NOCTURIA: ICD-10-CM

## 2022-08-10 DIAGNOSIS — R47.01 APHASIA: ICD-10-CM

## 2022-08-10 DIAGNOSIS — Z79.01 LONG TERM (CURRENT) USE OF ANTICOAGULANTS: ICD-10-CM

## 2022-08-10 DIAGNOSIS — N39.0 URINARY TRACT INFECTION, SITE NOT SPECIFIED: ICD-10-CM

## 2022-08-10 DIAGNOSIS — R26.9 UNSPECIFIED ABNORMALITIES OF GAIT AND MOBILITY: ICD-10-CM

## 2022-08-10 DIAGNOSIS — R53.83 OTHER FATIGUE: ICD-10-CM

## 2022-08-10 DIAGNOSIS — E04.1 NONTOXIC SINGLE THYROID NODULE: ICD-10-CM

## 2022-08-10 DIAGNOSIS — D43.2 NEOPLASM OF UNCERTAIN BEHAVIOR OF BRAIN, UNSPECIFIED: ICD-10-CM

## 2022-08-10 DIAGNOSIS — R47.1 DYSARTHRIA AND ANARTHRIA: ICD-10-CM

## 2022-08-10 DIAGNOSIS — E87.1 HYPO-OSMOLALITY AND HYPONATREMIA: ICD-10-CM

## 2022-08-10 DIAGNOSIS — R41.3 OTHER AMNESIA: ICD-10-CM

## 2022-08-10 DIAGNOSIS — Z48.3 AFTERCARE FOLLOWING SURGERY FOR NEOPLASM: ICD-10-CM

## 2022-08-10 DIAGNOSIS — I95.1 ORTHOSTATIC HYPOTENSION: ICD-10-CM

## 2022-08-10 DIAGNOSIS — I22.9 SUBSEQUENT ST ELEVATION (STEMI) MYOCARDIAL INFARCTION OF UNSPECIFIED SITE: ICD-10-CM

## 2022-08-10 DIAGNOSIS — B96.1 KLEBSIELLA PNEUMONIAE [K. PNEUMONIAE] AS THE CAUSE OF DISEASES CLASSIFIED ELSEWHERE: ICD-10-CM

## 2022-08-10 DIAGNOSIS — L89.311 PRESSURE ULCER OF RIGHT BUTTOCK, STAGE 1: ICD-10-CM

## 2022-08-10 DIAGNOSIS — D63.0 ANEMIA IN NEOPLASTIC DISEASE: ICD-10-CM

## 2022-08-10 DIAGNOSIS — E11.51 TYPE 2 DIABETES MELLITUS WITH DIABETIC PERIPHERAL ANGIOPATHY WITHOUT GANGRENE: ICD-10-CM

## 2022-08-10 DIAGNOSIS — Z86.711 PERSONAL HISTORY OF PULMONARY EMBOLISM: ICD-10-CM

## 2022-08-10 DIAGNOSIS — Z48.812 ENCOUNTER FOR SURGICAL AFTERCARE FOLLOWING SURGERY ON THE CIRCULATORY SYSTEM: ICD-10-CM

## 2022-08-10 DIAGNOSIS — I48.91 UNSPECIFIED ATRIAL FIBRILLATION: ICD-10-CM

## 2022-08-10 DIAGNOSIS — R30.0 DYSURIA: ICD-10-CM

## 2022-08-10 DIAGNOSIS — E78.5 HYPERLIPIDEMIA, UNSPECIFIED: ICD-10-CM

## 2022-08-10 DIAGNOSIS — Z87.891 PERSONAL HISTORY OF NICOTINE DEPENDENCE: ICD-10-CM

## 2022-08-10 DIAGNOSIS — C79.31 SECONDARY MALIGNANT NEOPLASM OF BRAIN: ICD-10-CM

## 2022-08-10 DIAGNOSIS — H04.129 DRY EYE SYNDROME OF UNSPECIFIED LACRIMAL GLAND: ICD-10-CM

## 2022-08-10 DIAGNOSIS — H53.9 UNSPECIFIED VISUAL DISTURBANCE: ICD-10-CM

## 2022-08-10 DIAGNOSIS — N28.9 DISORDER OF KIDNEY AND URETER, UNSPECIFIED: ICD-10-CM

## 2022-08-10 DIAGNOSIS — I73.9 PERIPHERAL VASCULAR DISEASE, UNSPECIFIED: Chronic | ICD-10-CM

## 2022-08-10 DIAGNOSIS — T38.0X5D ADVERSE EFFECT OF GLUCOCORTICOIDS AND SYNTHETIC ANALOGUES, SUBSEQUENT ENCOUNTER: ICD-10-CM

## 2022-08-10 DIAGNOSIS — E11.37X9: ICD-10-CM

## 2022-08-10 DIAGNOSIS — Z51.89 ENCOUNTER FOR OTHER SPECIFIED AFTERCARE: ICD-10-CM

## 2022-08-10 DIAGNOSIS — I25.10 ATHEROSCLEROTIC HEART DISEASE OF NATIVE CORONARY ARTERY WITHOUT ANGINA PECTORIS: ICD-10-CM

## 2022-08-10 DIAGNOSIS — I25.10 ATHEROSCLEROTIC HEART DISEASE OF NATIVE CORONARY ARTERY WITHOUT ANGINA PECTORIS: Chronic | ICD-10-CM

## 2022-08-10 DIAGNOSIS — R41.89 OTHER SYMPTOMS AND SIGNS INVOLVING COGNITIVE FUNCTIONS AND AWARENESS: ICD-10-CM

## 2022-08-10 DIAGNOSIS — Z95.5 PRESENCE OF CORONARY ANGIOPLASTY IMPLANT AND GRAFT: ICD-10-CM

## 2022-08-10 DIAGNOSIS — E11.649 TYPE 2 DIABETES MELLITUS WITH HYPOGLYCEMIA WITHOUT COMA: ICD-10-CM

## 2022-08-10 DIAGNOSIS — C34.90 MALIGNANT NEOPLASM OF UNSPECIFIED PART OF UNSPECIFIED BRONCHUS OR LUNG: ICD-10-CM

## 2022-08-10 LAB
ANION GAP SERPL CALC-SCNC: 15 MMOL/L — SIGNIFICANT CHANGE UP (ref 5–17)
BUN SERPL-MCNC: 49 MG/DL — HIGH (ref 7–23)
CALCIUM SERPL-MCNC: 9.3 MG/DL — SIGNIFICANT CHANGE UP (ref 8.4–10.5)
CHLORIDE SERPL-SCNC: 103 MMOL/L — SIGNIFICANT CHANGE UP (ref 96–108)
CO2 SERPL-SCNC: 22 MMOL/L — SIGNIFICANT CHANGE UP (ref 22–31)
CREAT SERPL-MCNC: 1.16 MG/DL — SIGNIFICANT CHANGE UP (ref 0.5–1.3)
EGFR: 47 ML/MIN/1.73M2 — LOW
GLUCOSE BLDC GLUCOMTR-MCNC: 136 MG/DL — HIGH (ref 70–99)
GLUCOSE BLDC GLUCOMTR-MCNC: 144 MG/DL — HIGH (ref 70–99)
GLUCOSE BLDC GLUCOMTR-MCNC: 147 MG/DL — HIGH (ref 70–99)
GLUCOSE BLDC GLUCOMTR-MCNC: 151 MG/DL — HIGH (ref 70–99)
GLUCOSE BLDC GLUCOMTR-MCNC: 164 MG/DL — HIGH (ref 70–99)
GLUCOSE SERPL-MCNC: 139 MG/DL — HIGH (ref 70–99)
HCT VFR BLD CALC: 37.9 % — SIGNIFICANT CHANGE UP (ref 34.5–45)
HGB BLD-MCNC: 12 G/DL — SIGNIFICANT CHANGE UP (ref 11.5–15.5)
MCHC RBC-ENTMCNC: 31.4 PG — SIGNIFICANT CHANGE UP (ref 27–34)
MCHC RBC-ENTMCNC: 31.7 GM/DL — LOW (ref 32–36)
MCV RBC AUTO: 99.2 FL — SIGNIFICANT CHANGE UP (ref 80–100)
NRBC # BLD: 0 /100 WBCS — SIGNIFICANT CHANGE UP (ref 0–0)
PLATELET # BLD AUTO: 326 K/UL — SIGNIFICANT CHANGE UP (ref 150–400)
POTASSIUM SERPL-MCNC: 4.1 MMOL/L — SIGNIFICANT CHANGE UP (ref 3.5–5.3)
POTASSIUM SERPL-SCNC: 4.1 MMOL/L — SIGNIFICANT CHANGE UP (ref 3.5–5.3)
RBC # BLD: 3.82 M/UL — SIGNIFICANT CHANGE UP (ref 3.8–5.2)
RBC # FLD: 13 % — SIGNIFICANT CHANGE UP (ref 10.3–14.5)
SARS-COV-2 RNA SPEC QL NAA+PROBE: SIGNIFICANT CHANGE UP
SARS-COV-2 RNA SPEC QL NAA+PROBE: SIGNIFICANT CHANGE UP
SODIUM SERPL-SCNC: 140 MMOL/L — SIGNIFICANT CHANGE UP (ref 135–145)
WBC # BLD: 10.97 K/UL — HIGH (ref 3.8–10.5)
WBC # FLD AUTO: 10.97 K/UL — HIGH (ref 3.8–10.5)

## 2022-08-10 PROCEDURE — A9585: CPT

## 2022-08-10 PROCEDURE — 71045 X-RAY EXAM CHEST 1 VIEW: CPT

## 2022-08-10 PROCEDURE — 82533 TOTAL CORTISOL: CPT

## 2022-08-10 PROCEDURE — 88112 CYTOPATH CELL ENHANCE TECH: CPT

## 2022-08-10 PROCEDURE — 36415 COLL VENOUS BLD VENIPUNCTURE: CPT

## 2022-08-10 PROCEDURE — 80053 COMPREHEN METABOLIC PANEL: CPT

## 2022-08-10 PROCEDURE — 88305 TISSUE EXAM BY PATHOLOGIST: CPT

## 2022-08-10 PROCEDURE — 87086 URINE CULTURE/COLONY COUNT: CPT

## 2022-08-10 PROCEDURE — 87040 BLOOD CULTURE FOR BACTERIA: CPT

## 2022-08-10 PROCEDURE — 87116 MYCOBACTERIA CULTURE: CPT

## 2022-08-10 PROCEDURE — 87635 SARS-COV-2 COVID-19 AMP PRB: CPT

## 2022-08-10 PROCEDURE — 80048 BASIC METABOLIC PNL TOTAL CA: CPT

## 2022-08-10 PROCEDURE — 87206 SMEAR FLUORESCENT/ACID STAI: CPT

## 2022-08-10 PROCEDURE — 97110 THERAPEUTIC EXERCISES: CPT

## 2022-08-10 PROCEDURE — 88360 TUMOR IMMUNOHISTOCHEM/MANUAL: CPT

## 2022-08-10 PROCEDURE — 85025 COMPLETE CBC W/AUTO DIFF WBC: CPT

## 2022-08-10 PROCEDURE — 82550 ASSAY OF CK (CPK): CPT

## 2022-08-10 PROCEDURE — 95714 VEEG EA 12-26 HR UNMNTR: CPT

## 2022-08-10 PROCEDURE — 97116 GAIT TRAINING THERAPY: CPT

## 2022-08-10 PROCEDURE — 87070 CULTURE OTHR SPECIMN AEROBIC: CPT

## 2022-08-10 PROCEDURE — 97162 PT EVAL MOD COMPLEX 30 MIN: CPT

## 2022-08-10 PROCEDURE — 97530 THERAPEUTIC ACTIVITIES: CPT

## 2022-08-10 PROCEDURE — 93306 TTE W/DOPPLER COMPLETE: CPT

## 2022-08-10 PROCEDURE — 93321 DOPPLER ECHO F-UP/LMTD STD: CPT

## 2022-08-10 PROCEDURE — 82088 ASSAY OF ALDOSTERONE: CPT

## 2022-08-10 PROCEDURE — U0005: CPT

## 2022-08-10 PROCEDURE — 76700 US EXAM ABDOM COMPLETE: CPT

## 2022-08-10 PROCEDURE — 86901 BLOOD TYPING SEROLOGIC RH(D): CPT

## 2022-08-10 PROCEDURE — 83735 ASSAY OF MAGNESIUM: CPT

## 2022-08-10 PROCEDURE — 93005 ELECTROCARDIOGRAM TRACING: CPT

## 2022-08-10 PROCEDURE — 93308 TTE F-UP OR LMTD: CPT

## 2022-08-10 PROCEDURE — 84443 ASSAY THYROID STIM HORMONE: CPT

## 2022-08-10 PROCEDURE — 74177 CT ABD & PELVIS W/CONTRAST: CPT | Mod: MG

## 2022-08-10 PROCEDURE — 85576 BLOOD PLATELET AGGREGATION: CPT

## 2022-08-10 PROCEDURE — 99285 EMERGENCY DEPT VISIT HI MDM: CPT

## 2022-08-10 PROCEDURE — U0003: CPT

## 2022-08-10 PROCEDURE — 97535 SELF CARE MNGMENT TRAINING: CPT

## 2022-08-10 PROCEDURE — 80164 ASSAY DIPROPYLACETIC ACD TOT: CPT

## 2022-08-10 PROCEDURE — 92526 ORAL FUNCTION THERAPY: CPT

## 2022-08-10 PROCEDURE — 85018 HEMOGLOBIN: CPT

## 2022-08-10 PROCEDURE — 80061 LIPID PANEL: CPT

## 2022-08-10 PROCEDURE — 85730 THROMBOPLASTIN TIME PARTIAL: CPT

## 2022-08-10 PROCEDURE — 71275 CT ANGIOGRAPHY CHEST: CPT

## 2022-08-10 PROCEDURE — 87641 MR-STAPH DNA AMP PROBE: CPT

## 2022-08-10 PROCEDURE — 87186 SC STD MICRODIL/AGAR DIL: CPT

## 2022-08-10 PROCEDURE — 88341 IMHCHEM/IMCYTCHM EA ADD ANTB: CPT

## 2022-08-10 PROCEDURE — 84100 ASSAY OF PHOSPHORUS: CPT

## 2022-08-10 PROCEDURE — 86850 RBC ANTIBODY SCREEN: CPT

## 2022-08-10 PROCEDURE — 71260 CT THORAX DX C+: CPT | Mod: MG

## 2022-08-10 PROCEDURE — 95700 EEG CONT REC W/VID EEG TECH: CPT

## 2022-08-10 PROCEDURE — 85027 COMPLETE CBC AUTOMATED: CPT

## 2022-08-10 PROCEDURE — 93970 EXTREMITY STUDY: CPT

## 2022-08-10 PROCEDURE — 81001 URINALYSIS AUTO W/SCOPE: CPT

## 2022-08-10 PROCEDURE — 82565 ASSAY OF CREATININE: CPT

## 2022-08-10 PROCEDURE — 82607 VITAMIN B-12: CPT

## 2022-08-10 PROCEDURE — 82746 ASSAY OF FOLIC ACID SERUM: CPT

## 2022-08-10 PROCEDURE — 70450 CT HEAD/BRAIN W/O DYE: CPT | Mod: MA

## 2022-08-10 PROCEDURE — 84132 ASSAY OF SERUM POTASSIUM: CPT

## 2022-08-10 PROCEDURE — 94010 BREATHING CAPACITY TEST: CPT

## 2022-08-10 PROCEDURE — 84436 ASSAY OF TOTAL THYROXINE: CPT

## 2022-08-10 PROCEDURE — 83615 LACTATE (LD) (LDH) ENZYME: CPT

## 2022-08-10 PROCEDURE — 94640 AIRWAY INHALATION TREATMENT: CPT

## 2022-08-10 PROCEDURE — 83036 HEMOGLOBIN GLYCOSYLATED A1C: CPT

## 2022-08-10 PROCEDURE — 82947 ASSAY GLUCOSE BLOOD QUANT: CPT

## 2022-08-10 PROCEDURE — 85014 HEMATOCRIT: CPT

## 2022-08-10 PROCEDURE — 97161 PT EVAL LOW COMPLEX 20 MIN: CPT

## 2022-08-10 PROCEDURE — 82330 ASSAY OF CALCIUM: CPT

## 2022-08-10 PROCEDURE — 86900 BLOOD TYPING SEROLOGIC ABO: CPT

## 2022-08-10 PROCEDURE — 88342 IMHCHEM/IMCYTCHM 1ST ANTB: CPT

## 2022-08-10 PROCEDURE — 85610 PROTHROMBIN TIME: CPT

## 2022-08-10 PROCEDURE — 97166 OT EVAL MOD COMPLEX 45 MIN: CPT

## 2022-08-10 PROCEDURE — 84295 ASSAY OF SERUM SODIUM: CPT

## 2022-08-10 PROCEDURE — 83605 ASSAY OF LACTIC ACID: CPT

## 2022-08-10 PROCEDURE — 84484 ASSAY OF TROPONIN QUANT: CPT

## 2022-08-10 PROCEDURE — 84146 ASSAY OF PROLACTIN: CPT

## 2022-08-10 PROCEDURE — 82803 BLOOD GASES ANY COMBINATION: CPT

## 2022-08-10 PROCEDURE — 92611 MOTION FLUOROSCOPY/SWALLOW: CPT

## 2022-08-10 PROCEDURE — 87015 SPECIMEN INFECT AGNT CONCNTJ: CPT

## 2022-08-10 PROCEDURE — 87077 CULTURE AEROBIC IDENTIFY: CPT

## 2022-08-10 PROCEDURE — 88307 TISSUE EXAM BY PATHOLOGIST: CPT

## 2022-08-10 PROCEDURE — C1713: CPT

## 2022-08-10 PROCEDURE — G1004: CPT

## 2022-08-10 PROCEDURE — 84145 PROCALCITONIN (PCT): CPT

## 2022-08-10 PROCEDURE — 82553 CREATINE MB FRACTION: CPT

## 2022-08-10 PROCEDURE — 83880 ASSAY OF NATRIURETIC PEPTIDE: CPT

## 2022-08-10 PROCEDURE — 83835 ASSAY OF METANEPHRINES: CPT

## 2022-08-10 PROCEDURE — 92523 SPEECH SOUND LANG COMPREHEN: CPT

## 2022-08-10 PROCEDURE — 82962 GLUCOSE BLOOD TEST: CPT

## 2022-08-10 PROCEDURE — 92610 EVALUATE SWALLOWING FUNCTION: CPT

## 2022-08-10 PROCEDURE — 82435 ASSAY OF BLOOD CHLORIDE: CPT

## 2022-08-10 PROCEDURE — 74230 X-RAY XM SWLNG FUNCJ C+: CPT

## 2022-08-10 PROCEDURE — C1889: CPT

## 2022-08-10 PROCEDURE — 87102 FUNGUS ISOLATION CULTURE: CPT

## 2022-08-10 PROCEDURE — 70553 MRI BRAIN STEM W/O & W/DYE: CPT

## 2022-08-10 PROCEDURE — 87640 STAPH A DNA AMP PROBE: CPT

## 2022-08-10 RX ORDER — APIXABAN 2.5 MG/1
1 TABLET, FILM COATED ORAL
Qty: 0 | Refills: 0 | DISCHARGE
Start: 2022-08-10

## 2022-08-10 RX ORDER — ASPIRIN/CALCIUM CARB/MAGNESIUM 324 MG
1 TABLET ORAL
Qty: 0 | Refills: 0 | DISCHARGE
Start: 2022-08-10

## 2022-08-10 RX ORDER — DEXAMETHASONE 0.5 MG/5ML
1 ELIXIR ORAL
Qty: 0 | Refills: 0 | DISCHARGE

## 2022-08-10 RX ORDER — DEXAMETHASONE 0.5 MG/5ML
2 ELIXIR ORAL
Refills: 0 | Status: COMPLETED | OUTPATIENT
Start: 2022-08-13 | End: 2022-08-14

## 2022-08-10 RX ORDER — DEXAMETHASONE 0.5 MG/5ML
2 ELIXIR ORAL THREE TIMES A DAY
Refills: 0 | Status: COMPLETED | OUTPATIENT
Start: 2022-08-11 | End: 2022-08-12

## 2022-08-10 RX ORDER — PANTOPRAZOLE SODIUM 20 MG/1
40 TABLET, DELAYED RELEASE ORAL DAILY
Refills: 0 | Status: DISCONTINUED | OUTPATIENT
Start: 2022-08-10 | End: 2022-08-10

## 2022-08-10 RX ORDER — AMLODIPINE BESYLATE 2.5 MG/1
5 TABLET ORAL DAILY
Refills: 0 | Status: DISCONTINUED | OUTPATIENT
Start: 2022-08-11 | End: 2022-08-11

## 2022-08-10 RX ORDER — BRIVARACETAM 25 MG/1
50 TABLET, FILM COATED ORAL
Refills: 0 | Status: DISCONTINUED | OUTPATIENT
Start: 2022-08-10 | End: 2022-08-10

## 2022-08-10 RX ORDER — LOSARTAN POTASSIUM 100 MG/1
100 TABLET, FILM COATED ORAL DAILY
Refills: 0 | Status: DISCONTINUED | OUTPATIENT
Start: 2022-08-11 | End: 2022-08-11

## 2022-08-10 RX ORDER — INSULIN GLARGINE 100 [IU]/ML
24 INJECTION, SOLUTION SUBCUTANEOUS AT BEDTIME
Refills: 0 | Status: DISCONTINUED | OUTPATIENT
Start: 2022-08-10 | End: 2022-08-10

## 2022-08-10 RX ORDER — INSULIN GLARGINE 100 [IU]/ML
28 INJECTION, SOLUTION SUBCUTANEOUS
Qty: 0 | Refills: 0 | DISCHARGE
Start: 2022-08-10

## 2022-08-10 RX ORDER — DEXAMETHASONE 0.5 MG/5ML
3 ELIXIR ORAL AT BEDTIME
Refills: 0 | Status: COMPLETED | OUTPATIENT
Start: 2022-08-10 | End: 2022-08-10

## 2022-08-10 RX ORDER — DEXAMETHASONE 0.5 MG/5ML
2 ELIXIR ORAL DAILY
Refills: 0 | Status: COMPLETED | OUTPATIENT
Start: 2022-08-15 | End: 2022-08-17

## 2022-08-10 RX ORDER — BACITRACIN ZINC 500 UNIT/G
1 OINTMENT IN PACKET (EA) TOPICAL
Qty: 0 | Refills: 0 | DISCHARGE
Start: 2022-08-10

## 2022-08-10 RX ORDER — BRIVARACETAM 25 MG/1
1 TABLET, FILM COATED ORAL
Qty: 0 | Refills: 0 | DISCHARGE
Start: 2022-08-10

## 2022-08-10 RX ORDER — ACETAMINOPHEN 500 MG
650 TABLET ORAL EVERY 6 HOURS
Refills: 0 | Status: DISCONTINUED | OUTPATIENT
Start: 2022-08-10 | End: 2022-08-10

## 2022-08-10 RX ORDER — INSULIN LISPRO 100/ML
8 VIAL (ML) SUBCUTANEOUS
Refills: 0 | Status: DISCONTINUED | OUTPATIENT
Start: 2022-08-10 | End: 2022-08-10

## 2022-08-10 RX ADMIN — DIVALPROEX SODIUM 500 MILLIGRAM(S): 500 TABLET, DELAYED RELEASE ORAL at 18:21

## 2022-08-10 RX ADMIN — Medication 10 UNIT(S): at 11:37

## 2022-08-10 RX ADMIN — Medication 10 UNIT(S): at 07:54

## 2022-08-10 RX ADMIN — DIVALPROEX SODIUM 500 MILLIGRAM(S): 500 TABLET, DELAYED RELEASE ORAL at 21:57

## 2022-08-10 RX ADMIN — Medication 25 MILLIGRAM(S): at 05:30

## 2022-08-10 RX ADMIN — PANTOPRAZOLE SODIUM 40 MILLIGRAM(S): 20 TABLET, DELAYED RELEASE ORAL at 11:36

## 2022-08-10 RX ADMIN — Medication 81 MILLIGRAM(S): at 11:36

## 2022-08-10 RX ADMIN — Medication 650 MILLIGRAM(S): at 12:10

## 2022-08-10 RX ADMIN — APIXABAN 5 MILLIGRAM(S): 2.5 TABLET, FILM COATED ORAL at 09:55

## 2022-08-10 RX ADMIN — Medication 6 MILLIGRAM(S): at 21:54

## 2022-08-10 RX ADMIN — Medication 650 MILLIGRAM(S): at 11:36

## 2022-08-10 RX ADMIN — SENNA PLUS 2 TABLET(S): 8.6 TABLET ORAL at 21:54

## 2022-08-10 RX ADMIN — ENOXAPARIN SODIUM 40 MILLIGRAM(S): 100 INJECTION SUBCUTANEOUS at 18:21

## 2022-08-10 RX ADMIN — Medication 2: at 07:54

## 2022-08-10 RX ADMIN — Medication 3 MILLIGRAM(S): at 21:59

## 2022-08-10 RX ADMIN — Medication 7 UNIT(S): at 17:13

## 2022-08-10 RX ADMIN — INSULIN GLARGINE 15 UNIT(S): 100 INJECTION, SOLUTION SUBCUTANEOUS at 22:45

## 2022-08-10 RX ADMIN — GABAPENTIN 200 MILLIGRAM(S): 400 CAPSULE ORAL at 21:54

## 2022-08-10 RX ADMIN — BRIVARACETAM 240 MILLIGRAM(S): 25 TABLET, FILM COATED ORAL at 05:29

## 2022-08-10 RX ADMIN — POLYETHYLENE GLYCOL 3350 17 GRAM(S): 17 POWDER, FOR SOLUTION ORAL at 18:21

## 2022-08-10 RX ADMIN — Medication 1 APPLICATION(S): at 05:31

## 2022-08-10 RX ADMIN — ATORVASTATIN CALCIUM 40 MILLIGRAM(S): 80 TABLET, FILM COATED ORAL at 21:58

## 2022-08-10 NOTE — PROGRESS NOTE ADULT - ASSESSMENT
ASSESSMENT:    82 year old gentlewoman, former smoker, without history of intrinsic lung disease. She has a history of HTN, HLD, DM, CAD s/p PCI (1990s and 2021) and PAD s/p RLE stenting. The patient has been having difficulty ambulating over the last several months due to a sense of imbalance with impaired gait. Her family has noted a change in her personality with pressured speech and insomnia. Outpatient cervical MRI incidentally note a CNS lesion. Lumbar spine MRI performed due to chronic back pain revealed multilevel foraminal stenosis/disc bulges. 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 The patient has no shortness of breath or hypoxemia on room air. She has no cough, sputum production, chest congestion or wheeze. No fevers, chills or sweats. No chest pain/pressure or palpitations.     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 presenting with several months of ataxia - frontal lobe lesions have lead to decreased inhibition as well as to pressured and loquacious speech    8/2 - chest pain last night -> EKG c/w STEMI - cardiac enzymes are elevated -> catheterization not performed due to recent neurosurgery -> started on ASA and heparin gtt -> now quite lethargic although awakes to state her name and the name of her children    8/4 - much more awake and alert -> A & O x 3 -> very slow speech; remains in the CCU -> EKG c/w STEMI - cardiac enzymes are elevated -> catheterization not performed due to recent neurosurgery -> continues on ASA and heparin gtt without a change in brain CT -> troponin level continues to increase; no shortness of breath or hypoxemia on a 2lpm nasal canula    8/5 - much more awake and alert -> A & O x 3 -> slow speech and talking as if intoxicated; transferred to the surgical ICU; continues on ASA and heparin gtt without a change in brain CT following a NSTEMI; no shortness of breath or hypoxemia on room air; no seizures on EEG; receiving NGT feeds due to dysphagia    8/8 - looks well  on the neurology floor; awake and alert chatting with her family while sitting in the chair - speech is now fluent; no shortness of breath or hypoxemia on room air; no cough, sputum production, hemoptysis, chest congestion or wheeze; no fevers, chills or sweats; no chest pain/pressure or palpitations; s/p bronchoscopy with endobronchial brushings/biopsies of an obstructing left upper lobe endobronchial lesion -> 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; no seizures on EEG; NGT is out and the patient is enjoying a regular diet    8/10 - looks well but overwhelmed; surgical staples removed; switched from heparin gtt -> Eliquis; transitioned from IV -> po anticonvulsants; awake and alert chatting with her family while sitting in the chair - speech is now fluent;       PLAN/RECOMMENDATIONS:    stable oxygenation on room air  mental status has returned to normal following a course of zosyn for E coli and Klebsiella UTI and possible post-obstructive pneumonia - MRSA nasal swab is negative   CTA -> pulmonary embolism in the lateral segmental branch of the right middle lobe pulmonary artery without evidence of right heart strain - multiple segmental branches in the left lung are obscured by motion - 1.2 cm left hilar lymph node - patent central airways - left upper lobe mass decreased in size - obstruction of the left apical posterior bronchus with associated segmental atelectasis along the mediastinum  spirometry    FEV1 - 1.81 liters - 88% predicted    FVC - 2.48 liters - 90% predicted    FEV1% - 73       c/w normal spirometry  patient is now on Eliquis for ACS which will treat the pulmonary embolism   s/p bronchoscopy -> 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 - foundation studies have been ordered  cardiology evaluation noted     STEMI - not a candidate for percutaneous coronary intervention given recent craniotomy limiting anti-platelet agents    medical management including ASA/lipitor/metoprolol/eliquis    repeat ECHO 8/8 ->  mild segmental left ventricular systolic dysfunction - no left ventricular thrombus - the basal  inferolateral wall, the basal inferior wall, the basal anterolateral wall and the mid inferior wall are hypokinetic  neurosurgery evaluation noted    s/p right craniotomy for tumor resection    repeat CT scan 8/2  -> right temporal craniotomy - minimally decreased right frontal pneumocephalus - similar subdural hemorrhage along the right lateral convexity measuring up to 7 to 8 mm in greatest depth anteriorly - extensive edema surrounding the right temporal surgical bed - leftward midline shift of 6 to 7 mm - partial effacement of the suprasellar and right perimesencephalic cisterns - mild asymmetric dilatation of the left lateral ventricle - low density lesion with mild surrounding edema in the left parasagittal frontal lobe.    prophylactic anticonvulsants -> brivaracetam po - no seizures on EEG    off decadron    incentive spirometry as able    regular diet    GI/DVT prophylaxis - protonix/eliquis  glucose control  bowel regimen  physical therapy    Will follow with you. Plan of care discussed with the patient and her family at bedside. No pulmonary objection to discharge to acute rehab    Satish Hopkins MD, [facility]  [phone]  Pulmonary Medicine

## 2022-08-10 NOTE — PROGRESS NOTE ADULT - ASSESSMENT
[facility] 6/23/21:   s/p successful angioplasty to the mid RCA followed by intracoronary brachytherapy.  [facility] 4/14/21: POBA of mid RCA (80%)   [facility] 3/25/21: PCI/POBA to pCX 90%   Echo 7/22/22: Normal left ventricular internal dimensions with discrete upper septal hypertrophy. Estimated ejection fraction 55%, The inferolateral wall is akinetic.  Echo 8/2/22:  grossly, preserved left ventricular systolic function with segmental wall motion abnormalities. EF approximately 55%. The mid to distal inferolateral and mid to distal inferior segments are hypkinetic. The apex is akinetic. No left ventricular thrombus seen.  limited Echo 8/8/22: EF 60-65%;  Mild segmental left ventricular systolic dysfunction; No left ventricular thrombus. The basal inferolateral wall, the basal inferior wall, the basal  anterolateral wall, and the mid inferior wall are hypokinetic.    a/p  81 y/o female (former smoker) with PMHx of HTN, CAD sp PCi,  HLD, MI, peripheral neuropathy, DM and peripheral artery disease presented with abnl imaging on MRI as outpt      #Brain/ Lung  mass   -MRI brain showed 2 mets R temporal 4.7x3.7x3cm L frontal 1.4x1.5x1.2cm   -s/p bronchoscopy  -s/p Right craniotomy for tumor  -management per neuro-- started on antiplt/ hep as mentioned below- repeat CT head  8/3 noted -neurosx to fu   -s/p eeg fu per neuro      #STEMI   -8/1 co cp-  found to have an inferior STEMI  -per cath lab  attending -Not a PCI candidate given potential benefits would be outweighed by the risks of the burden of anticoagulation and antiplatelet agents required for therapy  due to recent brain surgery  -on asa,  sp hep gtt -- now on eliquis    -limited ( 8/2 )echo grossly, preserved left ventricular systolic function with segmental wall motion abnormalities. EF approximately 55%. The mid to distal inferolateral and mid to distal inferior segments are hypkinetic. The apex is akinetic. No left ventricular thrombus seen.  -repeat limited echo (8/8) with preserved left ventricular systolic function with segmental wall motion abnormalities. EF 60-65%;  The basal inferolateral wall, the basal inferior wall, the basal  anterolateral wall, and the mid inferior wall are hypokinetic.  -c/w statin ,  bb,  -hyperkalemia improved-- CAN continue to HOLD ARB for now   -monitor ct head - neuro sx following     #acute PE   -sp hep gtt - now on eliquis     #new PAFIB   -now in NSR   -c.w BB   -ac as mentioned above     #CAD sp PCI   -antiplt/ac as mentioned above  -echo w inflat akinesis and overall preserved EF  -repeat limited echo as mentioned above   -c/w BB     #HTN  -cont current meds

## 2022-08-10 NOTE — H&P ADULT - HISTORY OF PRESENT ILLNESS
This is a 83 YO female with PMHx of HTN, CAD, HLD, MI, peripheral neuropathy, former smoker, DM and peripheral artery disease who presented to [facility] ED on 7/19 for imaging for her head due to abnormal spine MRI by her orthopedist (Dr. Castro) during workup for her chronic back problems. She has had gait/balance issues since April. Work up revealed brain  mass on 04/2022 she has had gait/balance issues & feels drunk once sits up for a while.     MR brain 7/20/22 with Right temporal lobe peripherally enhancing, centrally necrotic 3.1 x 2.6 cm mass with surrounding vasogenic edema. 8 mm leftward midline shift. Right uncal herniation. Additional left parafalcine 1.3 x 0.9 cm rim-enhancing cystic lesion with mild surrounding vasogenic edema. Tiny 0.3 x 0.3cm rim-enhancing lesion within the left frontal cortex concerning for metastatic disease. CT chest obstructing left upper lobe endobronchial lesion. 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     She is s/p Right craniotomy for resection of brain mass 7/27/22, pathology showing NSC carcinoma (favor adeno) post-op STEMI 8/1/22 Transferred to CICU- medical treatment on ASA/ heparin gtt & betablocker. no cath given high risk from comorbidities and recent surgery.    TTE 7/22 with normal LV internal dimensions with discrete upper septal hypertrophy. EF 55%, inferolateral wall is akinetic. 8/3 RML segmental PE, Started on heparin gtt.     Pt noticed with hyperkalemia. Renal consulted. Received Lokelma on 8/7 & 8/8. Off ARB since 8/4/22. NSCU consulted for encephalopathy on 8/3/22 likely UTI/early urosepsis, she was treated with 5 days of Zosyn. Mental status improved, EEG rule out seizures. Right temporal sharp waves seen, no seizures. Started on Briviact 8/3/22. Noted with new onset Afib w RVR on 8/4/22. She was transferred to floor on 8/6/22. PAT (150s) on 8/7/22. Troponin remained elevated, metoprolol was increased. Heparin anticoagulation switched to Eliquis on 8/9/22.    Seen & followed by medicine, cardiology, endocrinology, and Oncology. Plans for out-pt PET/CT scan, RT, and consideration for systemic therapy. Patient was evaluated by PM&R and therapy for functional deficits, gait/ADL impairments and acute rehabilitation was recommended. Patient was medically optimized for discharge to [facility] IRU on 8/10/22. This is a 83 YO female with PMHx of HTN, CAD, HLD, MI, peripheral neuropathy, former smoker, DM and peripheral artery disease who presented to [facility] ED on 7/19 for imaging for her head due to abnormal spine MRI by her orthopedist (Dr. Castro) during workup for her chronic back problems. She has had gait/balance issues since April. Work up revealed brain  mass on 04/2022 she has had gait/balance issues & feels drunk once sits up for a while.     MR brain 7/20/22 with Right temporal lobe peripherally enhancing, centrally necrotic 3.1 x 2.6 cm mass with surrounding vasogenic edema. 8 mm leftward midline shift. Right uncal herniation. Additional left parafalcine 1.3 x 0.9 cm rim-enhancing cystic lesion with mild surrounding vasogenic edema. Tiny 0.3 x 0.3cm rim-enhancing lesion within the left frontal cortex concerning for metastatic disease. CT chest obstructing left upper lobe endobronchial lesion. 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     She is s/p Right craniotomy for resection of brain mass 7/27/22, pathology showing NSC carcinoma (favor adeno) post-op STEMI 8/1/22 Transferred to CICU- medical treatment on ASA/ heparin gtt & betablocker. no cath given high risk from comorbidities and recent surgery. TTE 7/22 with normal LV internal dimensions with discrete upper septal hypertrophy. EF 55%, inferolateral wall is akinetic. 8/3 RML segmental PE, Started on heparin gtt.     Pt noticed with hyperkalemia. Renal consulted. Received Lokelma on 8/7 & 8/8. Off ARB since 8/4/22. NSCU consulted for encephalopathy on 8/3/22 likely UTI/early urosepsis, she was treated with 5 days of Zosyn. Mental status improved, EEG rule out seizures. Right temporal sharp waves seen, no seizures. Started on Briviact 8/3/22. Noted with new onset Afib w RVR on 8/4/22. She was transferred to floor on 8/6/22. PAT (150s) on 8/7/22. Troponin remained elevated, metoprolol was increased. Heparin anticoagulation switched to Eliquis on 8/9/22.    Seen & followed by medicine, cardiology, endocrinology, and Oncology. Plans for out-pt PET/CT scan, RT, and consideration for systemic therapy. Patient was evaluated by PM&R and therapy for functional deficits, gait/ADL impairments and acute rehabilitation was recommended. Patient was medically optimized for discharge to [facility] IRU on 8/10/22. Patient is a 83 YO female with PMHx HTN, DM, HLD, CAD, MI, PAD, peripheral neuropathy, former smoker, who presented to [facility] ED on 7/19/22 with chronic back pain and gait/balance issues x 4 months. She was found to have a brain mass on imaging while undergoing work up by her orthopedist (Dr. Castro).    MR brain 7/20/22 +Right temporal lobe peripherally enhancing, centrally necrotic 3.1 x 2.6 cm mass with surrounding vasogenic edema. 8 mm leftward midline shift. Right uncal herniation. Additional left parafalcine 1.3 x 0.9 cm rim-enhancing cystic lesion with mild surrounding vasogenic edema. Tiny 0.3 x 0.3cm rim-enhancing lesion within the left frontal cortex concerning for metastatic disease. CT chest showed an obstructing left upper lobe endobronchial lesion. 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 Patient underwent Right craniotomy for resection of brain mass 7/27/22, Pathology +NSC carcinoma (favor adeno). Plans for out-pt PET/CT scan, RT, and consideration for systemic therapy.     Post-op course significant for STEMI 8/1/22. Not a candidate for cath secondary to comorbidities and recent surgery. She was started on ASA/ heparin gtt and a betablocker. TTE 7/22 +normal LV internal dimensions with discrete upper septal hypertrophy. EF 55%, inferolateral wall is akinetic. She was found to have RML segmental PE 8./3. Hospital course also significant for hyperkalemia, and encephalopathy, likely secondary to UTI/early urosepsis (treated with 5 days of Zosyn). EEG +Right temporal sharp waves, no seizures; started on Briviact 8/3/22. She developed new onset Afib w RVR on 8/4/22. with elevated troponin; metoprolol was increased, and heparin anticoagulation switched to Eliquis on 8/9/22.    Patient was evaluated by PM&R and therapy for functional deficits, gait/ADL impairments and acute rehabilitation was recommended. Patient was medically optimized for discharge to [facility] IRF 8/10/22. Patient is a 81 YO female RH dominant with PMHx HTN, DM, HLD, CAD, MI, PAD, peripheral neuropathy, former smoker, who presented to [facility] ED on 7/19/22 with chronic back pain and gait/balance issues x 4 months. She was found to have a brain mass on imaging while undergoing work up by her orthopedist (Dr. Castro).    MR brain 7/20/22 +Right temporal lobe peripherally enhancing, centrally necrotic 3.1 x 2.6 cm mass with surrounding vasogenic edema. 8 mm leftward midline shift. Right uncal herniation. Additional left parafalcine 1.3 x 0.9 cm rim-enhancing cystic lesion with mild surrounding vasogenic edema. Tiny 0.3 x 0.3cm rim-enhancing lesion within the left frontal cortex concerning for metastatic disease. CT chest showed an obstructing left upper lobe endobronchial lesion. 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 Patient underwent Right craniotomy for resection of brain mass 7/27/22, Pathology +NSC carcinoma (favor adeno). Plans for out-pt PET/CT scan, RT, and consideration for systemic therapy.     Post-op course significant for STEMI 8/1/22. Not a candidate for cath secondary to comorbidities and recent surgery. She was started on ASA/ heparin gtt and a betablocker. TTE 7/22 +normal LV internal dimensions with discrete upper septal hypertrophy. EF 55%, inferolateral wall is akinetic. She was found to have RML segmental PE 8./3. Hospital course also significant for hyperkalemia, and encephalopathy, likely secondary to UTI/early urosepsis (treated with 5 days of Zosyn). EEG +Right temporal sharp waves, no seizures; started on Briviact 8/3/22. She developed new onset Afib w RVR on 8/4/22. with elevated troponin; metoprolol was increased, and heparin anticoagulation switched to Eliquis on 8/9/22.    Patient was evaluated by PM&R and therapy for functional deficits, gait/ADL impairments and acute rehabilitation was recommended. Patient was medically optimized for discharge to [facility] IRF 8/10/22.

## 2022-08-10 NOTE — PROGRESS NOTE ADULT - SUBJECTIVE AND OBJECTIVE BOX
CC: f/u for leukocytosis and UTI    Patient reports: she is afebrile, is anticipating discharge soon    REVIEW OF SYSTEMS:  All other review of systems negative (Comprehensive ROS)    Antimicrobials Day #  :off    Other Medications Reviewed  MEDICATIONS  (STANDING):  apixaban 5 milliGRAM(s) Oral every 12 hours  aspirin  chewable 81 milliGRAM(s) Oral daily  atorvastatin 80 milliGRAM(s) Oral at bedtime  BACItracin   Ointment 1 Application(s) Topical two times a day  brivaracetam 50 milliGRAM(s) Oral two times a day  dextrose 50% Injectable 25 Gram(s) IV Push once  dextrose 50% Injectable 12.5 Gram(s) IV Push once  dextrose 50% Injectable 25 Gram(s) IV Push once  dextrose Oral Gel 15 Gram(s) Oral once  gabapentin 200 milliGRAM(s) Oral at bedtime  glucagon  Injectable 1 milliGRAM(s) IntraMuscular once  insulin glargine Injectable (LANTUS) 28 Unit(s) SubCutaneous at bedtime  insulin lispro (ADMELOG) corrective regimen sliding scale   SubCutaneous every 6 hours  insulin lispro Injectable (ADMELOG) 10 Unit(s) SubCutaneous three times a day before meals  metoprolol tartrate 25 milliGRAM(s) Oral two times a day  pantoprazole    Tablet 40 milliGRAM(s) Oral daily  psyllium Powder 1 Packet(s) Enteral Tube every 12 hours  senna 2 Tablet(s) Oral at bedtime    T(F): 98.4 (08-10-22 @ 12:47), Max: 98.6 (08-10-22 @ 01:02)  HR: 84 (08-10-22 @ 12:47)  BP: 111/64 (08-10-22 @ 12:47)  RR: 18 (08-10-22 @ 12:47)  SpO2: 97% (08-10-22 @ 12:47)  Wt(kg): --    PHYSICAL EXAM:  General: alert, no acute distress, craniotomy incision intact  Eyes:  anicteric, no conjunctival injection, no discharge  Oropharynx: no lesions or injection 	  Neck: supple, without adenopathy  Lungs: clear to auscultation  Heart: regular rate and rhythm; no murmur, rubs or gallops  Abdomen: soft, nondistended, nontender, without mass or organomegaly  Skin: no lesions  Extremities: no clubbing, cyanosis, or edema  Neurologic: alert, oriented, moves all extremities    LAB RESULTS:                        12.0   10.97 )-----------( 326      ( 10 Aug 2022 11:24 )             37.9     08-10    140  |  103  |  49<H>  ----------------------------<  139<H>  4.1   |  22  |  1.16    Ca    9.3      10 Aug 2022 11:24          MICROBIOLOGY:  RECENT CULTURES:      RADIOLOGY REVIEWED:

## 2022-08-10 NOTE — PROGRESS NOTE ADULT - TIME BILLING
Agree with above NP note.  cv stable post cranio  care per neurosx  resume asa when feasible from neurosx standpoint
Agree with above NP note.  events noted  patient developed acute onset chest pain this afternoon with ECG evidence of inferior stemi  patient with known RCA disease, s/p pci, s/p poba, s/p brachy   patient s/p cranio 727 for new brain mass/met disease and has been off antiplatelets to minimize ich/bleed risk   in light of recent craniotomy, concern for post op brain bleeding with dual antiplatelet therapy, jonathan pci a/c, and known high risk anatomic features of likely culprit vessel, emergent cath deferred and decision was made to proceed with medical therapy  pt not candidate for lytics   patient remains HD stable  cont current mgmt per CCU  monitor for signs of neuro deficits/compromise  neuro sx f/u  plavix/bilinta deferred for now, R/B in light of recent craniotomy   if becomes unstable, refractory angina, develops decomp HF, consider IABP if tolerating iv heparin  case discussed w CCU team, interventional and patient's family in detail     care per CCU
agree with above  cv stable  pending MRI brain   IR for biopsy lung lesion  cont Dex 4q6 per neuro sx   resume ASA once cleared by IR -- currently on hold for Bx      dvt ppx
agree with above NP note.  events noted  ECHO noted with overall preserved LVEF  Continue current medical mgmt of MI  CTA noted for lateral acute PE  cont IV heparin for MI/PE/PAF  neurosx f/u  monitor neuro exam  brain imaging per neurosx
Agree with above NP note.  cv stable post cranio  care per neurosx  resume asa when feasible from neurosx standpoint
Agree with above NP note.  events noted  patient developed acute onset chest pain this afternoon with ECG evidence of inferior stemi  patient with known RCA disease, s/p pci, s/p poba, s/p brachy   patient s/p cranio 727 for new brain mass/met disease and has been off antiplatelets to minimize ich/bleed risk   in light of recent craniotomy, concern for post op brain bleeding with dual antiplatelet therapy, jonathan pci a/c, and known high risk anatomic features of likely culprit vessel, emergent cath deferred and decision was made to proceed with medical therapy  pt not candidate for lytics   patient remains HD stable  tx to CCU  cont asa, IV heparin  monitor for signs of neuro deficits/compromise  neuro sx f/u  plavix/bilinta deferred for now, R/B in light of recent craniotomy   if becomes unstable, refractory angina, develops decomp HF, consider IABP if tolerating iv heparin  d/w cath atth  d/w ccu fellow   d/w son Dick [phone] via phone  d/w r/b/a of cath vs med tx risks of antiplat/a/c post recent craniotomy   he is in agreement with overall medical treatment plan  updated outpatient cardio Dr Uribe     care per CCU
agree with above  cv stable  f/u MRI brain   IR for biopsy lung lesion  cont Dex 4q6 per neuro sx   resume ASA once cleared by IR -- currently on hold for Bx  dvt ppx
agree with above  cv stable  s/p IR biopsy  await neurosx monday   cardiac optimized to proceed with acceptable cardiac risk   resume ASA 81mg as soon as feasible form IR/neurosx standpoint  dvt ppx
agree with above NP note.  Vevents noted  ECHO noted with overall preserved LVEF  Continue current medical mgmt of MI  CTA noted for lateral acute PE  cont IV heparin for MI/PE/PAF  care per icu   neurosx f/u  monitor neuro exam  brain imaging per neurosx
agree with the above assessment and plan by ROSA Lindquist.  remains cv stable  continue Eliquis  continue BB  resume statin
Patient seen and examined, agree with the above assessment and plan by ROSA Lindquist.  CV status remains stable  pt somnolent but arousable  BP stable  ECHO w preserved LVEF  Continue current medical mgmt of MI  CTA noted for lateral acute PE  cont IV heparin for MI/PE  steroids per neurosx
Patient seen and examined, agree with the above assessment and plan by ROSA Lindquist.  remains cv stable  troponins were elevated yest  unclear if related to MI or subsequent PE  no symptoms  if no further invasive procedures planned and pt cleared from a neuro sx perspective I would start oral AC
agree with the above assessment and plan by ROSA Lindquist.  remains cv stable  continue Eliquis  continue BB  resume statin
POD 1 R craniotomy for tumor resection, likely metastatic lung cancer   CT Head in AM with expected post-op changes    MRI post-op pending   decadron decreased to 4 mg q 8 hr  for vasogenic  cerebral edema  Valproic acid 5000 mg q 12 hr   Q4 hr neuro checks   lovenox 40 mg sc qhs
Data reviewed, patient seen/examined and care plan reviewed/updated with fellow. Care plan coordinated with NSGY.

## 2022-08-10 NOTE — PROGRESS NOTE ADULT - ASSESSMENT
83 yo female with history of HTN,CAD,PAD,MI, and DM admitted July 19th with gait insatbility and OPD scans showing brain lesions.  She was found to have lung cancer, bronch on 7/20 with poorly differentiated cancer and CT scan with brain mets.  She is s/p RT frontal craniotomy  on 7/27 for metastatic adenocarcinoma.  She has a STEMI on 8/1.  Her recent events from 8/3-8/4  include a leukocytosis, lethargy, and she is now known to have PE.  Due to concerns of possible infection, zosyn was started empirically.  No fever, possible post obstructive pneumonia,although X rays have not shown progressive infiltrate .Regardless,  she is being covered broadly although not clear if infection is active.  Her blood cultures remain  negative so far.Mixed GNR's in the urine may reflect colonizers,she requires ISC,  although she is being covered.  CTA with improvement in known ANTHONY mass.Her leukocytosis may simply be reactive to PE/STEMI/Surgery/Cancer?steroids  She appears clinically stable, no clinical signs of active infection.  Mixed GNR in urine are sensitive to zosyn.(E Coli and Klebsiella)  5 day course of zosyn completed 8/7, she is being monitored off antibiotics  Her leukocytosis has resolved  Suggest:  1.Monitor off antibiotics  2.Anticoagulation per NS  3 Steroid taper per NS  4,With no additional ID w/u planned we will stop actively following.Please call if ID issues  arise

## 2022-08-10 NOTE — PROGRESS NOTE ADULT - NS ATTEND OPT1 GEN_ALL_CORE
I attest my time as attending is greater than 50% of the total combined time spent on qualifying patient care activities by the PA/NP and attending.

## 2022-08-10 NOTE — H&P ADULT - NSHPPHYSICALEXAM_GEN_ALL_CORE
Constitutional - NAD, Comfortable  HEENT - NCAT, EOMI  Neck - Supple, No limited ROM  Chest - good chest expansion, good respiratory effort, CTAB   Cardio - warm and well perfused, RRR, no murmur  Abdomen -  Soft, NTND  Extremities - No peripheral edema, No calf tenderness   Neurologic Exam:                    Cognitive -             Orientation: Awake, Alert, AAO to self, place, date, year, situation            Attention:  recall 3 objects without cuing, spelled word "WORLD" backwards            Memory: Recent/ remote memory intact            Thought: process, content appropriate     Speech - Fluent, Comprehensible, No dysarthria, No aphasia      Cranial Nerves - No facial asymmetry, Tongue midline, EOMI, Shoulder shrug intact     Motor -                      LEFT    UE - ShAB 5/5, EF 5/5, EE 5/5, WE 5/5,  WNL                    RIGHT UE - ShAB 5/5, EF 5/5, EE 5/5, WE 5/5,  WNL                    LEFT    LE - HF 5/5, KE 5/5, DF 5/5, PF 5/5                    RIGHT LE - HF 5/5, KE 5/5, DF 5/5, PF 5/5        Sensory - Intact to LT bilateral     Coordination - FTN / HTS intact     OculoVestibular -  No nystagmus  Psychiatric - Mood stable, Affect WNL  Skin on admission: stage 1 ulcer on LLE lateral heel and right buttock, cranial staples c/d/i. Forehead pressure injury open blister area with granulation tissue Constitutional - NAD, Comfortable  HEENT - NCAT, EOMI  Neck - Supple, No limited ROM  Chest - good chest expansion, good respiratory effort, CTAB   Cardio - warm and well perfused, RRR, no murmur  Abdomen -  Soft, NTND  Extremities - No peripheral edema, No calf tenderness   Neurologic Exam:                    Cognitive -             Orientation: Awake, Alert, AAO to self, place, date, year, situation            Attention:  recall 3 objects without cuing, spelled word "WORLD" backwards            Memory: Recent/ remote memory intact            Thought: process, content appropriate     Speech - Fluent, Comprehensible, No dysarthria, No aphasia      Cranial Nerves - No facial asymmetry, Tongue midline, EOMI, Shoulder shrug intact     Motor -                      LEFT    UE - ShAB 5/5, EF 5/5, EE 5/5, WE 5/5,  WNL                    RIGHT UE - ShAB 5/5, EF 5/5, EE 5/5, WE 5/5,  WNL                    LEFT    LE - HF 5/5, KE 5/5, DF 5/5, PF 5/5                    RIGHT LE - HF 5/5, KE 5/5, DF 5/5, PF 5/5        Sensory - Intact to LT bilateral     Coordination - FTN / HTS intact     OculoVestibular -  No nystagmus  Psychiatric - Mood stable, Affect WNL  Skin on admission: stage 1 pressure ulcer on LLE lateral heel and right buttock, cranial staples c/d/i. Forehead open blister area with granulation tissue Constitutional - NAD, Comfortable. Eyes open spontaneously, follows simple commands. Reduced complex command following, reduced sustained attention +word finding deficits at sentence level  HEENT - +right cranial incision, intact, dry no swelling no TTP or erythema    Chest - good chest expansion, good respiratory effort, CTAB   Cardio - warm and well perfused, RRR, no murmur  Abdomen -  Soft, NTND  Extremities - No peripheral edema, No calf tenderness   Neurologic Exam:                    Cognitive -             Orientation: Awake, Alert, AAO to self, place, date, year, grossly to situation            Attention:  immediate recall 3/3 delayed 2/3, spelled word "WORLD" backwards            Memory: difficulty with chrolongy events            Thought: process, content appropriate     Speech - +confrontational naming intact +word finding deficits in conversation     Cranial Nerves - No facial asymmetry, Tongue midline, EOMI, Shoulder shrug intact     Motor -                      LEFT    UE - ShAB 5/5, EF 5/5, EE 5/5, WE 5/5,  WNL                    RIGHT UE - ShAB 5/5, EF 5/5, EE 5/5, WE 5/5,  WNL                    LEFT    LE - HF 5/5, KE 5/5, DF 5/5, PF 5/5                    RIGHT LE - HF 5/5, KE 5/5, DF 5/5, PF 5/5        Sensory - Intact to LT bilateral     Coordination - FTN / HTS intact     OculoVestibular -  No nystagmus  Psychiatric - Mood stable, Affect WNL  Skin on admission: stage 1 pressure ulcer on LLE lateral heel and right buttock, cranial staples c/d/i. Forehead open blister area with granulation tissue

## 2022-08-10 NOTE — PROGRESS NOTE ADULT - PROVIDER SPECIALTY LIST ADULT
Cardiology
Heme/Onc
Heme/Onc
Infectious Disease
Internal Medicine
MATILDA
Neurosurgery
Pulmonology
Cardiology
Heme/Onc
Infectious Disease
NSICU
Neurosurgery
Neurosurgery
Pulmonology
Rad Onc
Cardiology
Endocrinology
Heme/Onc
Infectious Disease
Internal Medicine
MATILDA
MATILDA
NSICU
Neurosurgery
Pulmonology
Rehab Medicine
Cardiology
Endocrinology
Heme/Onc
Infectious Disease
Internal Medicine
NSICU
NSICU
Neurosurgery
Endocrinology
Endocrinology
Neurosurgery
Endocrinology

## 2022-08-10 NOTE — H&P ADULT - ASSESSMENT
ASSESSMENT/PLAN    This is a 81 YO female with PMHx of HTN, CAD, HLD, MI, peripheral neuropathy, former smoker, DM and peripheral artery disease who presented to [facility] ED on 7/19 for imaging for her head due to abnormal spine MRI by her orthopedist. Work up revealed brain mass on 04/2022. She is s/p Right craniotomy for resection of brain mass 7/27/22, pathology showing NSC carcinoma. Post-op significant for STEMI, hyperkalemia, encephalopathy 2/2 UTI, new onset a-fib. Patient now with gait Instability, ADL impairments and Functional impairments.    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   - Out-pt PET/CT scan, RT, and consideration for systemic therapy.   - Brivaracetam 50mg BID    #HTN  #CAD  - c/w ASA    #HLD  - Lipitor 40mg daily    #DMII  - ISS and FS  - Lantus and Admelog  - a1c 8.7 on 7/20    #A-fib  - Eliquis 5mg BID  - Metoprolol 25mg BID    #PE  - On Eliquis 5mg BID    #Pain management  - Tylenol PRN, gabapentin    #DVT ppx  - SCD, TEDs    #GI ppx  - Protonix 40mg    #Bowel Regimen  - Senna, miralax PRN    #Bladder management  - BS on admission, and q 8 hours (SC if > 400)  - Monitor UO    #FEN   - Diet: Consistent Carbohydrate Diabetic     #Skin:  - No active issues at this time  - Skin on admission: ***  - Pressure injury/Skin: Turn Q2hrs while in bed, OOB to Chair, PT/OT     #Sleep:   - Maintain quiet hours and low stim environment.  - Melatonin PRN to maximize participation in therapy during the day.     #Precaution  - Fall, Aspiration, cardiac, Seizure    Outpatient Follow-up (Specialty/Name of physician):    Willard Loomis (MD; YUKO; MS)  Neurosurgery  [address] [place] [address], [address]  [place] [address]  Phone: [phone]  Fax: [phone]  Follow Up Time:     Yaron Alexander)  Hematology; Internal Medicine; Medical Oncology  [address] [facility], [address]  [place] [address]  Phone: [phone]  Fax: [phone]  Follow Up Time:     Rafael Funes  RADIATION ONCOLOGY  [address] [facility] [address] [place] [address]  Phone: [phone]  Fax: [phone]    Satish Hopkins (MD)  Internal Medicine; Pulmonary Disease  [address] [facility], [address]  [place] [address]  Phone: [phone]  Fax: [phone]    Satish Alcaraz)  Cardiovascular Disease; Interventional Cardiology; Nuclear Cardiology  [address] [facility], [address]  [place] [address]  Phone: [phone]  Fax: [phone]    MEDICAL PROGNOSIS: GOOD            REHAB POTENTIAL: GOOD             ESTIMATED DISPOSITION: HOME WITH HOME CARE            ELOS: 10-14 Days   EXPECTED THERAPY:     P.T. 1hr/day       O.T. 1hr/day      S.L.P. 1hr/day     P&O Unnecessary     EXP FREQUENCY: 5 days per 7 day period     PRESCREEN COMPARISON:   I have reviewed the prescreen information and I have found no relevant changes between the preadmission screening and my post admission evaluation     RATIONALE FOR INPATIENT ADMISSION - Patient demonstrates the following: (check all that apply)  [X] Medically appropriate for rehabilitation admission  [X] Has attainable rehab goals with an appropriate initial discharge plan  [X] Has rehabilitation potential (expected to make a significant improvement within a reasonable period of time)   [X] Requires close medical management by a rehab physician, rehab nursing care, Hospitalist and comprehensive interdisciplinary team (including PT, OT, & or SLP, Prosthetics and Orthotics)   ASSESSMENT/PLAN    This is a 83 YO female with PMHx of HTN, CAD, HLD, MI, peripheral neuropathy, former smoker, DM and peripheral artery disease who presented to [facility] ED on 7/19 for imaging for her head due to abnormal spine MRI by her orthopedist. Work up revealed brain mass on 04/2022. She is s/p Right craniotomy for resection of brain mass 7/27/22, pathology showing NSC carcinoma. Post-op significant for STEMI, hyperkalemia, encephalopathy 2/2 UTI, new onset a-fib. Patient now with gait Instability, ADL impairments and Functional impairments.    #Brain Mass  - MR brain 7/20/22 with Right temporal lobe peripherally enhancing, centrally necrotic 3.1 x 2.6 cm mass with surrounding vasogenic edema with METS  - s/p Right craniotomy for resection of brain mass 7/27/22, pathology showing NSC carcinoma   - Start Comprehensive Rehab Program: PT/OT/ST, 3hours daily and 5 days weekly  - Bronchial biopsy consistent with poorly differentiated adenocarcinoma 7/20/22  - Out-pt PET/CT scan, RT, and consideration for systemic therapy  - Brivaracetam 50mg BID    #HTN  #CAD  #A-fib  - c/w ASA  - Eliquis 5mg BID  - Metoprolol 25mg BID    #HLD  - Lipitor 40mg daily    #DMII  - ISS and FS  - Lantus and Admelog  - a1c 8.7 on 7/20    #Thyroid Nodule  - OP f/u with Endocrinology    #PE  - On Eliquis 5mg BID    #Pain management  - Tylenol PRN, gabapentin    #DVT ppx  - SCD, TEDs    #GI ppx  - Protonix 40mg    #Bowel Regimen  - Senna, miralax PRN    #Bladder management  - BS on admission, and q 8 hours (SC if > 400)  - Monitor UO    #FEN   - Diet: Consistent Carbohydrate Diabetic     #Skin:  - No active issues at this time  - Skin on admission: ***  - Pressure injury/Skin: Turn Q2hrs while in bed, OOB to Chair, PT/OT     #Sleep:   - Maintain quiet hours and low stim environment.  - Melatonin PRN to maximize participation in therapy during the day.     #Precaution  - Fall, Aspiration, cardiac, Seizure    Outpatient Follow-up (Specialty/Name of physician):    Willard Loomis (MD; YUKO; MS)  Neurosurgery  [address] [place] [address], [address]  [place] [address]  Phone: [phone]  Fax: [phone]  Follow Up Time:     Yaron Alexander)  Hematology; Internal Medicine; Medical Oncology  [address] [facility], [address]  [place] [address]  Phone: [phone]  Fax: [phone]  Follow Up Time:     Rafael Funes  RADIATION ONCOLOGY  [address] [facility] [address] - [place] [address]  Phone: [phone]  Fax: [phone]    Satish Hopkins (MD)  Internal Medicine; Pulmonary Disease  [address] [facility], [address]  [place] [address]  Phone: [phone]  Fax: [phone]    Satish Alcaraz)  Cardiovascular Disease; Interventional Cardiology; Nuclear Cardiology  [address] [facility], [address]  [place] [address]  Phone: [phone]  Fax: [phone]    MEDICAL PROGNOSIS: GOOD            REHAB POTENTIAL: GOOD             ESTIMATED DISPOSITION: HOME WITH HOME CARE            ELOS: 10-14 Days   EXPECTED THERAPY:     P.T. 1hr/day       O.T. 1hr/day      S.L.P. 1hr/day     P&O Unnecessary     EXP FREQUENCY: 5 days per 7 day period     PRESCREEN COMPARISON:   I have reviewed the prescreen information and I have found no relevant changes between the preadmission screening and my post admission evaluation     RATIONALE FOR INPATIENT ADMISSION - Patient demonstrates the following: (check all that apply)  [X] Medically appropriate for rehabilitation admission  [X] Has attainable rehab goals with an appropriate initial discharge plan  [X] Has rehabilitation potential (expected to make a significant improvement within a reasonable period of time)   [X] Requires close medical management by a rehab physician, rehab nursing care, Hospitalist and comprehensive interdisciplinary team (including PT, OT, & or SLP, Prosthetics and Orthotics)   Patient is a 81 YO female with PMHx HTN, DM, HLD, CAD, MI, PAD, peripheral neuropathy, former smoker, who presented to [facility] ED on 7/19/22 with brain mass s/p Right craniotomy/resection 7/27/22, Pathology +NSC carcinoma. Post-op course significant for STEMI, hyperkalemia, encephalopathy 2/2 UTI, new onset a-fib. Patient now with gait Instability, ADL impairments and Functional impairments.    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   - MR brain 7/20/22 with Right temporal lobe peripherally enhancing, centrally necrotic 3.1 x 2.6 cm mass with surrounding vasogenic edema with METS  - s/p Right craniotomy for resection of brain mass 7/27/22  - Bronchial biopsy consistent with poorly differentiated adenocarcinoma 7/20/22  - Brivaracetam 50mg BID  - Start Comprehensive Rehab Program: PT/OT/ST, 3hours daily and 5 days weekly  - recreation therapy  - Out-pt PET/CT scan, RT, and consideration for systemic therapy  - Precautions: cardiac, DM, fall, sensory, SZ, AMS, aspiration, AC    # HTN/CAD/A-fib  - c/w ASA  - Eliquis 5mg BID  - Metoprolol 25mg BID  - admission EKG  - hospitalist consult    # HLD  - Lipitor 40mg daily    # DMII  - a1c 8.7 on 7/20  - ISS and FS  - Lantus and Admelog  - nutrition and hospitalist consults    # Thyroid Nodule  - OP f/u with Endocrinology    # PE  - On Eliquis 5mg BID    # Pain management  - Tylenol PRN  - gabapentin    # GI ppx  - Protonix 40mg  - Senna, miralax PRN    # Bladder management  - BS on admission, and q 8 hours (SC if > 400)  - Monitor UO    # FEN   - Diet: Consistent Carbohydrate Diabetic     # Skin:  - No active issues at this time  - Skin on admission: ***  - Pressure injury/Skin: Turn Q2hrs while in bed, OOB to Chair, PT/OT     # DVT ppx  - eliquis  - SCD, TEDs        Outpatient Follow-up (Specialty/Name of physician):    Willard Loomis (MD; YUKO; MS)  Neurosurgery  [address] [place] [address], [address]  [place] [address]  Phone: [phone]  Fax: [phone]  Follow Up Time:     Yaron Alexander)  Hematology; Internal Medicine; Medical Oncology  [address] [facility], [address]  [place] [address]  Phone: [phone]  Fax: [phone]  Follow Up Time:     Rafael Funes  RADIATION ONCOLOGY  [address] [facility] [address] - [place] [address]  Phone: [phone]  Fax: [phone]    Satish Hopkins (MD)  Internal Medicine; Pulmonary Disease  [address] [facility], [address]  [place] [address]  Phone: [phone]  Fax: [phone]    Satish Alcaraz (MD)  Cardiovascular Disease; Interventional Cardiology; Nuclear Cardiology  [address] [facility], [address]  [place] [address]  Phone: [phone]  Fax: [phone]    MEDICAL PROGNOSIS: fair-          REHAB POTENTIAL: fair+            ESTIMATED DISPOSITION: HOME WITH HOME CARE and caregiver support         ELOS: 17-21 Days   EXPECTED THERAPY:     P.T. 1hr/day       O.T. 1hr/day      S.L.P. 1hr/day     P&O Unnecessary     EXP FREQUENCY: 5 days per 7 day period     PRESCREEN COMPARISON:   I have reviewed the prescreen information and I have found no relevant changes between the preadmission screening and my post admission evaluation     RATIONALE FOR INPATIENT ADMISSION - Patient demonstrates the following: (check all that apply)  [X] Medically appropriate for rehabilitation admission  [X] Has attainable rehab goals with an appropriate initial discharge plan  [X] Has rehabilitation potential (expected to make a significant improvement within a reasonable period of time)   [X] Requires close medical management by a rehab physician, rehab nursing care, Hospitalist and comprehensive interdisciplinary team (including PT, OT, & or SLP, Prosthetics and Orthotics)   Patient is a 83 YO female with PMHx HTN, DM, HLD, CAD, MI, PAD, peripheral neuropathy, former smoker, who presented to [facility] ED on 7/19/22 with brain mass s/p Right craniotomy/resection 7/27/22, Pathology +NSC carcinoma. Post-op course significant for STEMI, hyperkalemia, encephalopathy 2/2 UTI, new onset a-fib. Patient now with gait Instability, ADL impairments and Functional impairments.    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, primary lung  - MR brain 7/20/22 with Right temporal lobe peripherally enhancing, centrally necrotic 3.1 x 2.6 cm mass with surrounding vasogenic edema with METS  - s/p Right craniotomy for resection of brain mass 7/27/22  - Bronchial biopsy consistent with poorly differentiated adenocarcinoma 7/20/22  - Brivaracetam 50mg BID  - Start Comprehensive Rehab Program: PT/OT/ST, 3hours daily and 5 days weekly  - recreation therapy  - Out-pt PET/CT scan, RT, and consideration for systemic therapy  - Precautions: cardiac, DM, fall, sensory, SZ, AMS, aspiration, AC    # HTN/CAD/A-fib  - c/w ASA  - Eliquis 5mg BID  - Metoprolol 25mg BID  - admission EKG  - hospitalist consult    # HLD  - Lipitor 40mg daily    # DMII  - a1c 8.7 on 7/20  - ISS and FS  - Lantus and Admelog  - nutrition and hospitalist consults    # Thyroid Nodule  - OP f/u with Endocrinology    # PE  - On Eliquis 5mg BID    # Pain management  - Tylenol PRN  - gabapentin    # GI ppx  - Protonix 40mg  - Senna, miralax PRN    # Bladder management  - BS on admission, and q 8 hours (SC if > 400)  - Monitor UO    # FEN   - Diet: Consistent Carbohydrate Diabetic     # Skin:  - No active issues at this time  - Skin on admission: ***  - Pressure injury/Skin: Turn Q2hrs while in bed, OOB to Chair, PT/OT     # DVT ppx  - eliquis  - SCD, TEDs        Outpatient Follow-up (Specialty/Name of physician):    Willard Lomois (MD; YUKO; MS)  Neurosurgery  [address] [place] [address], [address]  [place] [address]  Phone: [phone]  Fax: [phone]  Follow Up Time:     Yaron Alexander)  Hematology; Internal Medicine; Medical Oncology  [address] [facility], [address]  [place] [address]  Phone: [phone]  Fax: [phone]  Follow Up Time:     Rafael Funes  RADIATION ONCOLOGY  [address] [facility] [address] - [place] [address]  Phone: [phone]  Fax: [phone]    Satish Hopkins)  Internal Medicine; Pulmonary Disease  [address] [facility], [address]  [place] [address]  Phone: [phone]  Fax: [phone]    Satish Alcaraz)  Cardiovascular Disease; Interventional Cardiology; Nuclear Cardiology  [address] [facility], [address]  [place] [address]  Phone: [phone]  Fax: [phone]    MEDICAL PROGNOSIS: fair-          REHAB POTENTIAL: fair+            ESTIMATED DISPOSITION: HOME WITH HOME CARE and caregiver support         ELOS: 17-21 Days   EXPECTED THERAPY:     P.T. 1hr/day       O.T. 1hr/day      S.L.P. 1hr/day     P&O Unnecessary     EXP FREQUENCY: 5 days per 7 day period     PRESCREEN COMPARISON:   I have reviewed the prescreen information and I have found no relevant changes between the preadmission screening and my post admission evaluation     RATIONALE FOR INPATIENT ADMISSION - Patient demonstrates the following: (check all that apply)  [X] Medically appropriate for rehabilitation admission  [X] Has attainable rehab goals with an appropriate initial discharge plan  [X] Has rehabilitation potential (expected to make a significant improvement within a reasonable period of time)   [X] Requires close medical management by a rehab physician, rehab nursing care, Hospitalist and comprehensive interdisciplinary team (including PT, OT, & or SLP, Prosthetics and Orthotics)   Patient is a 81 YO female with PMHx HTN, DM, HLD, CAD, MI, PAD, peripheral neuropathy, former smoker, who presented to [facility] ED on 7/19/22 with brain mass s/p Right craniotomy/resection 7/27/22, Pathology +NSC carcinoma. Post-op course significant for STEMI, hyperkalemia, encephalopathy 2/2 UTI, new onset a-fib. Patient now with gait Instability, ADL impairments and Functional impairments.    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, primary lung  - MR brain 7/20/22 with Right temporal lobe peripherally enhancing, centrally necrotic 3.1 x 2.6 cm mass with surrounding vasogenic edema with METS  - s/p Right craniotomy for resection of brain mass 7/27/22  - Bronchial biopsy consistent with poorly differentiated adenocarcinoma 7/20/22  - Brivaracetam 50mg BID  - Start Comprehensive Rehab Program: PT/OT/ST, 3hours daily and 5 days weekly  - recreation therapy  - Out-pt PET/CT scan, RT, and consideration for systemic therapy  - Precautions: cardiac, DM, fall, sensory, SZ, AMS, aspiration, AC    # HTN/CAD/A-fib  - c/w ASA  - Eliquis 5mg BID  - Metoprolol 25mg BID  - admission EKG  - hospitalist consult    # HLD  - Lipitor 40mg daily    # DMII  - a1c 8.7 on 7/20  - ISS and FS  - Lantus and Admelog  - nutrition and hospitalist consults    # Thyroid Nodule  - OP f/u with Endocrinology    # PE  - On Eliquis 5mg BID    # Pain management  - Tylenol PRN  - gabapentin    # GI ppx  - Protonix 40mg  - Senna, miralax PRN    # Bladder management  - BS on admission, and q 8 hours (SC if > 400)  - Monitor UO    # FEN   - Diet: Consistent Carbohydrate Diabetic     # Skin:  - Skin on admission: stage 1 pressure ulcer on LLE lateral heel and right buttock, cranial staples c/d/i. Forehead open blister area with granulation tissue  - Pressure injury/Skin: Turn Q2hrs while in bed, OOB to Chair, PT/OT     # DVT ppx  - eliquis  - SCD, TEDs        Outpatient Follow-up (Specialty/Name of physician):    Willard Loomis (MD; YUKO; MS)  Neurosurgery  [address] [facility], [address]  [place] [address]  Phone: [phone]  Fax: [phone]  Follow Up Time:     Yaron Alexander (MD)  Hematology; Internal Medicine; Medical Oncology  [address] [facility], [address]  [place] [address]  Phone: [phone]  Fax: [phone]  Follow Up Time:     Rafael Funes  RADIATION ONCOLOGY  [address] [facility] [address] - [place] [address]  Phone: [phone]  Fax: [phone]    aStish Hopkins (MD)  Internal Medicine; Pulmonary Disease  [address] [facility], [address]  [place] [address]  Phone: [phone]  Fax: [phone]    Satish Alcaraz)  Cardiovascular Disease; Interventional Cardiology; Nuclear Cardiology  [address] [facility], [address]  [place] [address]  Phone: [phone]  Fax: [phone]    MEDICAL PROGNOSIS: fair-          REHAB POTENTIAL: fair+            ESTIMATED DISPOSITION: HOME WITH HOME CARE and caregiver support         ELOS: 17-21 Days   EXPECTED THERAPY:     P.T. 1hr/day       O.T. 1hr/day      S.L.P. 1hr/day     P&O Unnecessary     EXP FREQUENCY: 5 days per 7 day period     PRESCREEN COMPARISON:   I have reviewed the prescreen information and I have found no relevant changes between the preadmission screening and my post admission evaluation     RATIONALE FOR INPATIENT ADMISSION - Patient demonstrates the following: (check all that apply)  [X] Medically appropriate for rehabilitation admission  [X] Has attainable rehab goals with an appropriate initial discharge plan  [X] Has rehabilitation potential (expected to make a significant improvement within a reasonable period of time)   [X] Requires close medical management by a rehab physician, rehab nursing care, Hospitalist and comprehensive interdisciplinary team (including PT, OT, & or SLP, Prosthetics and Orthotics)   Patient is a 83 YO female with PMHx HTN, DM, HLD, CAD, MI, PAD, peripheral neuropathy, former smoker, who presented to [facility] ED on 7/19/22 with brain mass s/p Right craniotomy/resection 7/27/22, Pathology +NSC carcinoma. Post-op course significant for STEMI, hyperkalemia, encephalopathy 2/2 UTI, new onset a-fib. Patient now with gait Instability, ADL impairments and Functional impairments.    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, primary lung aphasia, cognitive impairment  - MR brain 7/20/22 with Right temporal lobe peripherally enhancing, centrally necrotic 3.1 x 2.6 cm mass with surrounding vasogenic edema with METS  - s/p Right craniotomy for resection of brain mass 7/27/22  - Bronchial biopsy consistent with poorly differentiated adenocarcinoma 7/20/22  - Brivaracetam 50mg BID  - Start Comprehensive Rehab Program: PT/OT/ST, 3hours daily and 5 days weekly  - recreation therapy  - Out-pt PET/CT scan, RT, and consideration for systemic therapy  - Precautions: cardiac, DM, fall, sensory, SZ, AMS, aspiration, AC    # HTN/CAD/A-fib  - c/w ASA  - Eliquis 5mg BID  - Metoprolol 25mg BID  - admission EKG  - hospitalist consult    # HLD  - Lipitor 40mg daily    # DMII  - a1c 8.7 on 7/20  - ISS and FS  - Lantus and Admelog  - nutrition and hospitalist consults    # Thyroid Nodule  - OP f/u with Endocrinology    # PE  - On Eliquis 5mg BID    # Pain management  - Tylenol PRN  - gabapentin    # GI ppx  - Protonix 40mg  - Senna, miralax PRN    # Bladder management  - BS on admission, and q 8 hours (SC if > 400)  - Monitor UO    # FEN   - Diet: Consistent Carbohydrate Diabetic     # Skin:  - Skin on admission: stage 1 pressure ulcer on LLE lateral heel and right buttock, cranial staples c/d/i. Forehead open blister area with granulation tissue  - Pressure injury/Skin: Turn Q2hrs while in bed, OOB to Chair, PT/OT     # DVT ppx  - eliquis  - SCD, TEDs        Outpatient Follow-up (Specialty/Name of physician):    Willard Loomis (MD; YUKO; MS)  Neurosurgery  [address] [facility], [address]  [place] [address]  Phone: [phone]  Fax: [phone]  Follow Up Time:     Yaron Alexander)  Hematology; Internal Medicine; Medical Oncology  [address] [facility], [address]  [place] [address]  Phone: [phone]  Fax: [phone]  Follow Up Time:     Rafael Funes  RADIATION ONCOLOGY  [address] [facility] [place] [address]  Phone: [phone]  Fax: [phone]    Satish Hopkins (MD)  Internal Medicine; Pulmonary Disease  [address] [facility], [address]  [place] [address]  Phone: [phone]  Fax: [phone]    Satish Alcaraz)  Cardiovascular Disease; Interventional Cardiology; Nuclear Cardiology  [address] [facility], [address]  [place] [address]  Phone: [phone]  Fax: [phone]    MEDICAL PROGNOSIS: fair-          REHAB POTENTIAL: fair+            ESTIMATED DISPOSITION: HOME WITH HOME CARE and caregiver support         ELOS: 17-21 Days   EXPECTED THERAPY:     P.T. 1hr/day       O.T. 1hr/day      S.L.P. 1hr/day     P&O Unnecessary     EXP FREQUENCY: 5 days per 7 day period     PRESCREEN COMPARISON:   I have reviewed the prescreen information and I have found no relevant changes between the preadmission screening and my post admission evaluation     RATIONALE FOR INPATIENT ADMISSION - Patient demonstrates the following: (check all that apply)  [X] Medically appropriate for rehabilitation admission  [X] Has attainable rehab goals with an appropriate initial discharge plan  [X] Has rehabilitation potential (expected to make a significant improvement within a reasonable period of time)   [X] Requires close medical management by a rehab physician, rehab nursing care, Hospitalist and comprehensive interdisciplinary team (including PT, OT, & or SLP, Prosthetics and Orthotics)

## 2022-08-10 NOTE — PROGRESS NOTE ADULT - PROBLEM SELECTOR PROBLEM 1
DM (diabetes mellitus)

## 2022-08-10 NOTE — H&P ADULT - NSHPLABSRESULTS_GEN_ALL_CORE
12.0   10.97 )-----------( 326      ( 10 Aug 2022 11:24 )             37.9     08-10    140  |  103  |  49<H>  ----------------------------<  139<H>  4.1   |  22  |  1.16    Ca    9.3      10 Aug 2022 11:24    Limited Transthoracic Echo (w/Cont) (08.08.22 @ 15:43)     Conclusions:  1. Endocardial visualization enhanced with intravenous injection of Ultrasonic Enhancing Agent (Lumason). Mild segmental left ventricular systolic dysfunction. No left ventricular thrombus. The basal  inferolateral wall, the basal inferior wall, the basal anterolateral wall, and the mid inferior wall are hypokinetic.    VA Duplex Lower Ext Vein Scan, Bilat (08.07.22 @ 12:59)     IMPRESSION:  No evidence of deep venous thrombosis in either lower extremity.      US Abdomen Complete (US Abdomen Complete .) (08.04.22 @ 15:00)     IMPRESSION:    Mildly distended gallbladder. No evidence of acute cholecystitis. Possible mild hydronephrosis.    CT Angio Chest PE Protocol w/ IV Cont (08.03.22 @ 09:59)     IMPRESSION:  Pulmonary embolism in the lateral segmental branch of the right middle lobe. No evidence of right heart strain.    CT Head No Cont (08.03.22 @ 04:38)     IMPRESSION:    Redemonstration of right temporal craniotomy for prior resection of a lesion. There is a large degree of hypoattenuation throughout the right temporal lobe and extending into the right basal ganglia and right parietal lobe, suggestive of a combination of vasogenic edema and/or   encephalomalacia/gliosis. This results in secondary mass effect upon the parenchyma of the right cerebral hemisphere, not significantly changed. There is mild mass effect on the ventricular system, right greater than left. There is secondary right to left midline shift of approximately 5.7   mm. Expected postsurgical extra-axial pneumocephalus is reidentified without significant change. Tiny expected subdural hematoma overlying the right anterior superior frontal lobe is reidentified without change.    Reidentified is a hypoattenuating lesion within the left medial frontal lobe measuring 1.4 x 1.6 cm, compatible with known additional metastasis. No significant surrounding edema.

## 2022-08-10 NOTE — PROGRESS NOTE ADULT - SUBJECTIVE AND OBJECTIVE BOX
CARDIOLOGY FOLLOW UP - Dr. Alcaraz  DATE OF SERVICE: 8/10/22     CC no cp or sob       REVIEW OF SYSTEMS:  CONSTITUTIONAL: No fever, weight loss, or fatigue  RESPIRATORY: No cough, wheezing, chills or hemoptysis; No Shortness of Breath  CARDIOVASCULAR: No chest pain, palpitations, passing out, dizziness, or leg swelling  GASTROINTESTINAL: No abdominal or epigastric pain. No nausea, vomiting, or hematemesis; No diarrhea or constipation. No melena or hematochezia.  VASCULAR: No edema     PHYSICAL EXAM:  T(C): 36.9 (08-10-22 @ 12:47), Max: 37 (08-10-22 @ 01:02)  HR: 84 (08-10-22 @ 12:47) (58 - 84)  BP: 111/64 (08-10-22 @ 12:47) (95/64 - 124/78)  RR: 18 (08-10-22 @ 12:47) (18 - 18)  SpO2: 97% (08-10-22 @ 12:47) (97% - 98%)  Wt(kg): --  I&O's Summary    09 Aug 2022 07:01  -  10 Aug 2022 07:00  --------------------------------------------------------  IN: 65 mL / OUT: 1600 mL / NET: -1535 mL    10 Aug 2022 07:01  -  10 Aug 2022 13:14  --------------------------------------------------------  IN: 600 mL / OUT: 600 mL / NET: 0 mL        Appearance: Normal	  Cardiovascular: Normal S1 S2,RRR, No JVD, No murmurs  Respiratory: Lungs clear to auscultation	  Gastrointestinal:  Soft, Non-tender, + BS	  Extremities: Normal range of motion, No clubbing, cyanosis or edema      Home Medications:  acetaminophen 325 mg oral tablet: 2 tab(s) orally every 6 hours, As needed, , Mild - moderate pain (10 Aug 2022 12:53)  apixaban 5 mg oral tablet: 1 tab(s) orally every 12 hours (10 Aug 2022 12:53)  aspirin 81 mg oral tablet, chewable: 1 tab(s) orally once a day (10 Aug 2022 12:53)  atorvastatin 40 mg oral tablet: 1 tab(s) orally once a day (at bedtime) (01 Aug 2022 13:52)  bacitracin 500 units/g topical ointment: 1 application topically 2 times a day x 7 days on  healing blisters  (10 Aug 2022 12:53)  bisacodyl 5 mg oral delayed release tablet: 1 tab(s) orally once a day, As needed, Constipation (01 Aug 2022 13:52)  brivaracetam 50 mg oral tablet: 1 tab(s) orally 2 times a day (10 Aug 2022 12:53)  gabapentin 100 mg oral capsule: 2 cap(s) orally once a day (at bedtime) (01 Aug 2022 13:52)  insulin glargine 100 units/mL subcutaneous solution: 28 unit(s) subcutaneous once a day (at bedtime) (10 Aug 2022 12:53)  insulin lispro 100 units/mL injectable solution: 10 unit(s) subcutaneous 3 times a day (with meals) (10 Aug 2022 12:53)  insulin lispro 100 units/mL injectable solution: low  dose sliding scale as per hospital/facility protocol, 3 times a day (before meals) &amp; bedtime  (10 Aug 2022 12:53)  melatonin 5 mg oral tablet: 1 tab(s) orally once a day (at bedtime), As needed, Sleep (01 Aug 2022 13:52)  metoprolol tartrate 25 mg oral tablet: 1 tab(s) orally 2 times a day (01 Aug 2022 13:52)  pantoprazole 40 mg oral delayed release tablet: 1 tab(s) orally once a day (before a meal) (01 Aug 2022 13:52)  senna leaf extract oral tablet: 2 tab(s) orally once a day (at bedtime) (01 Aug 2022 13:52)      MEDICATIONS  (STANDING):  apixaban 5 milliGRAM(s) Oral every 12 hours  aspirin  chewable 81 milliGRAM(s) Oral daily  atorvastatin 80 milliGRAM(s) Oral at bedtime  BACItracin   Ointment 1 Application(s) Topical two times a day  brivaracetam 50 milliGRAM(s) Oral two times a day  dextrose 50% Injectable 25 Gram(s) IV Push once  dextrose 50% Injectable 12.5 Gram(s) IV Push once  dextrose 50% Injectable 25 Gram(s) IV Push once  dextrose Oral Gel 15 Gram(s) Oral once  gabapentin 200 milliGRAM(s) Oral at bedtime  glucagon  Injectable 1 milliGRAM(s) IntraMuscular once  insulin glargine Injectable (LANTUS) 28 Unit(s) SubCutaneous at bedtime  insulin lispro (ADMELOG) corrective regimen sliding scale   SubCutaneous every 6 hours  insulin lispro Injectable (ADMELOG) 10 Unit(s) SubCutaneous three times a day before meals  metoprolol tartrate 25 milliGRAM(s) Oral two times a day  pantoprazole    Tablet 40 milliGRAM(s) Oral daily  psyllium Powder 1 Packet(s) Enteral Tube every 12 hours  senna 2 Tablet(s) Oral at bedtime      TELEMETRY: nsr 	    ECG:  	  RADIOLOGY:   DIAGNOSTIC TESTING:  [ ] Echocardiogram:  [ ]  Catheterization:  [ ] Stress Test:    OTHER: 	    LABS:	 	    Creatine Kinase, Serum: 40 U/L [25 - 170] (08-08 @ 20:19)  Troponin T, High Sensitivity Result: 3489 ng/L [0 - 51] (08-07 @ 20:41)  Troponin T, High Sensitivity Result: 1633 ng/L [0 - 51] (08-04 @ 21:53)  Troponin T, High Sensitivity Result: 1535 ng/L [0 - 51] (08-04 @ 15:08)  Troponin T, High Sensitivity Result: 1971 ng/L [0 - 51] (08-04 @ 04:20)  Creatine Kinase, Serum: 265 U/L [25 - 170] (08-04 @ 04:20)  CKMB Units: 18.9 ng/mL [0.0 - 3.8] (08-04 @ 04:20)                          12.0   10.97 )-----------( 326      ( 10 Aug 2022 11:24 )             37.9     08-10    140  |  103  |  49<H>  ----------------------------<  139<H>  4.1   |  22  |  1.16    Ca    9.3      10 Aug 2022 11:24      PTT - ( 09 Aug 2022 06:20 )  PTT:65.9 sec

## 2022-08-10 NOTE — PROGRESS NOTE ADULT - SUBJECTIVE AND OBJECTIVE BOX
SUBJECTIVE:     OVERNIGHT EVENTS: none    Vital Signs Last 24 Hrs  T(C): 36.9 (10 Aug 2022 12:47), Max: 37 (10 Aug 2022 01:02)  T(F): 98.4 (10 Aug 2022 12:47), Max: 98.6 (10 Aug 2022 01:02)  HR: 84 (10 Aug 2022 12:47) (58 - 84)  BP: 111/64 (10 Aug 2022 12:47) (95/64 - 124/78)  BP(mean): --  RR: 18 (10 Aug 2022 12:47) (18 - 18)  SpO2: 97% (10 Aug 2022 12:47) (97% - 98%)    Parameters below as of 10 Aug 2022 12:47  Patient On (Oxygen Delivery Method): room air        PHYSICAL EXAM:     Neurological: Awake, alert oriented to person, place and time, Following Commands, Face Symmetrical, Speech Fluent, Moving all extremities spont/ag/purposeful, No Drift, Sensation to Light Touch Intact    Pulmonary: Clear to Auscultation, No Rales, No Rhonchi, No Wheezes     Cardiovascular: S1, S2, Regular Rate and Rhythm     Gastrointestinal: Soft, Nontender, Nondistended     Incision: cranial staples c/d/i. Forehead pressure injury open blister area with granulation tissue     LABS:                        12.0   10.97 )-----------( 326      ( 10 Aug 2022 11:24 )             37.9    08-10    140  |  103  |  49<H>  ----------------------------<  139<H>  4.1   |  22  |  1.16    Ca    9.3      10 Aug 2022 11:24  PTT:65.9 sec    IMAGING:         MEDICATIONS:  Antibiotics:    Neuro:  acetaminophen     Tablet .. 650 milliGRAM(s) Oral every 6 hours PRN Moderate Pain (4 - 6)  brivaracetam 50 milliGRAM(s) Oral two times a day  gabapentin 200 milliGRAM(s) Oral at bedtime  melatonin 5 milliGRAM(s) Oral at bedtime PRN Sleep  metoprolol tartrate 25 milliGRAM(s) Oral two times a day  pantoprazole    Tablet 40 milliGRAM(s) Oral daily  psyllium Powder 1 Packet(s) Enteral Tube every 12 hours  senna 2 Tablet(s) Oral at bedtim  apixaban 5 milliGRAM(s) Oral every 12 hours  aspirin  chewable 81 milliGRAM(s) Oral daily  atorvastatin 80 milliGRAM(s) Oral at bedtime  BACItracin   Ointment 1 Application(s) Topical two times a day  glucagon  Injectable 1 milliGRAM(s) IntraMuscular once  insulin glargine Injectable (LANTUS) 24 Unit(s) SubCutaneous at bedtime  insulin lispro (ADMELOG) corrective regimen sliding scale   SubCutaneous every 6 hours  insulin lispro Injectable (ADMELOG) 8 Unit(s) SubCutaneous three times a day before meals      DIET:

## 2022-08-10 NOTE — PROGRESS NOTE ADULT - PROBLEM SELECTOR PLAN 4
Suggest to continue medications, monitoring, FU primary team recommendations.
Suggest to continue medications, monitoring, FU primary team recommendations. .
Suggest to continue medications, monitoring, FU primary team recommendations.
Suggest to continue medications, monitoring, FU primary team recommendations. .
Suggest to continue medications, monitoring, FU primary team recommendations. .
Suggest to continue medications, monitoring, FU primary team recommendations.
Suggest to continue medications, monitoring, FU primary team recommendations. .
Suggest to continue medications, monitoring, FU primary team recommendations.
Suggest to continue medications, monitoring, FU primary team recommendations. .
Suggest to continue medications, monitoring, FU primary team recommendations.

## 2022-08-10 NOTE — H&P ADULT - NSHPSOCIALHISTORY_GEN_ALL_CORE
Smoking - Denied  EtOH - Denied   Drugs - Denied     Patient lives   PTA: Independent in ADLs and ambulation     CURRENT FUNCTIONAL STATUS  Bed Mobility:   Transfers:   Gait: Smoking - Denied  EtOH - Denied   Drugs - Denied     Patient lives   PTA: Independent in ADLs and ambulation     CURRENT FUNCTIONAL STATUS  Transfers: mod assist with RW   Gait: most assist with RW x 15ft  Diet: regular texture diet

## 2022-08-10 NOTE — PROGRESS NOTE ADULT - SUBJECTIVE AND OBJECTIVE BOX
DATE OF SERVICE: 08-10-22 @ 22:10    Patient is a 82y old  Female who presents with a chief complaint of abnormal imaging (10 Aug 2022 15:07)      SUBJECTIVE / OVERNIGHT EVENTS:  No chest pain. No shortness of breath. No complaints. No events overnight.     MEDICATIONS  (STANDING):  apixaban 5 milliGRAM(s) Oral every 12 hours  aspirin  chewable 81 milliGRAM(s) Oral daily  atorvastatin 80 milliGRAM(s) Oral at bedtime  BACItracin   Ointment 1 Application(s) Topical two times a day  brivaracetam 50 milliGRAM(s) Oral two times a day  dextrose 50% Injectable 25 Gram(s) IV Push once  dextrose 50% Injectable 12.5 Gram(s) IV Push once  dextrose 50% Injectable 25 Gram(s) IV Push once  dextrose Oral Gel 15 Gram(s) Oral once  gabapentin 200 milliGRAM(s) Oral at bedtime  glucagon  Injectable 1 milliGRAM(s) IntraMuscular once  insulin glargine Injectable (LANTUS) 24 Unit(s) SubCutaneous at bedtime  insulin lispro (ADMELOG) corrective regimen sliding scale   SubCutaneous every 6 hours  insulin lispro Injectable (ADMELOG) 8 Unit(s) SubCutaneous three times a day before meals  metoprolol tartrate 25 milliGRAM(s) Oral two times a day  pantoprazole    Tablet 40 milliGRAM(s) Oral daily  psyllium Powder 1 Packet(s) Enteral Tube every 12 hours  senna 2 Tablet(s) Oral at bedtime    MEDICATIONS  (PRN):  acetaminophen     Tablet .. 650 milliGRAM(s) Oral every 6 hours PRN Moderate Pain (4 - 6)  melatonin 5 milliGRAM(s) Oral at bedtime PRN Sleep      Vital Signs Last 24 Hrs  T(C): 36.7 (10 Aug 2022 21:52), Max: 37 (10 Aug 2022 01:02)  T(F): 98 (10 Aug 2022 21:52), Max: 98.6 (10 Aug 2022 01:02)  HR: 77 (10 Aug 2022 21:52) (58 - 84)  BP: 111/69 (10 Aug 2022 21:52) (99/57 - 119/60)  BP(mean): --  RR: 16 (10 Aug 2022 21:52) (16 - 18)  SpO2: 98% (10 Aug 2022 21:52) (96% - 98%)    Parameters below as of 10 Aug 2022 12:47  Patient On (Oxygen Delivery Method): room air      CAPILLARY BLOOD GLUCOSE      POCT Blood Glucose.: 147 mg/dL (10 Aug 2022 17:10)  POCT Blood Glucose.: 164 mg/dL (10 Aug 2022 16:33)  POCT Blood Glucose.: 136 mg/dL (10 Aug 2022 11:16)  POCT Blood Glucose.: 151 mg/dL (10 Aug 2022 07:27)    I&O's Summary    09 Aug 2022 07:01  -  10 Aug 2022 07:00  --------------------------------------------------------  IN: 65 mL / OUT: 1600 mL / NET: -1535 mL    10 Aug 2022 07:01  -  10 Aug 2022 22:10  --------------------------------------------------------  IN: 600 mL / OUT: 600 mL / NET: 0 mL        PHYSICAL EXAM:  GENERAL: NAD, well-developed  HEAD:  Atraumatic, Normocephalic  EYES: EOMI, PERRLA, conjunctiva and sclera clear  NECK: Supple, No JVD  CHEST/LUNG: Clear to auscultation bilaterally; No wheeze  HEART: Regular rate and rhythm; No murmurs, rubs, or gallops  ABDOMEN: Soft, Nontender, Nondistended; Bowel sounds present  EXTREMITIES:  2+ Peripheral Pulses, No clubbing, cyanosis, or edema  PSYCH: AAOx3  NEUROLOGY: non-focal  SKIN: No rashes or lesions    LABS:                        12.0   10.97 )-----------( 326      ( 10 Aug 2022 11:24 )             37.9     08-10    140  |  103  |  49<H>  ----------------------------<  139<H>  4.1   |  22  |  1.16    Ca    9.3      10 Aug 2022 11:24      PTT - ( 09 Aug 2022 06:20 )  PTT:65.9 sec          RADIOLOGY & ADDITIONAL TESTS:    Imaging Personally Reviewed:    Consultant(s) Notes Reviewed:      Care Discussed with Consultants/Other Providers:

## 2022-08-10 NOTE — PROGRESS NOTE ADULT - PROBLEM SELECTOR PROBLEM 4
Brain mass

## 2022-08-10 NOTE — PROGRESS NOTE ADULT - PROBLEM SELECTOR PLAN 5
Oncology team FU
Suggest to continue medications, monitoring, FU primary team/Pulm recommendations.
Oncology team FU
Suggest to continue medications, monitoring, FU primary team/Pulm recommendations.
Suggest to continue medications, monitoring, FU primary team/Pulm recommendations.
Oncology team FU
Suggest to continue medications, monitoring, FU primary team/Pulm recommendations.
Oncology team FU
Suggest to continue medications, monitoring, FU primary team/Pulm recommendations.
Oncology team FU
Suggest to continue medications, monitoring, FU primary team/Pulm recommendations.

## 2022-08-10 NOTE — PROGRESS NOTE ADULT - SUBJECTIVE AND OBJECTIVE BOX
Chief complaint  Patient is a 82y old  Female who presents with a chief complaint of abnormal imaging (10 Aug 2022 13:32)   Review of systems  Patient in bed, looks comfortable, no hypoglycemic episodes.    Labs and Fingersticks  CAPILLARY BLOOD GLUCOSE      POCT Blood Glucose.: 136 mg/dL (10 Aug 2022 11:16)  POCT Blood Glucose.: 151 mg/dL (10 Aug 2022 07:27)  POCT Blood Glucose.: 185 mg/dL (09 Aug 2022 21:10)  POCT Blood Glucose.: 130 mg/dL (09 Aug 2022 16:21)      Anion Gap, Serum: 15 (08-10 @ 11:24)  Anion Gap, Serum: 12 (08-09 @ 06:20)  Anion Gap, Serum: 10 (08-08 @ 20:19)      Calcium, Total Serum: 9.3 (08-10 @ 11:24)  Calcium, Total Serum: 9.0 (08-09 @ 06:20)  Calcium, Total Serum: 9.1 (08-08 @ 20:19)          08-10    140  |  103  |  49<H>  ----------------------------<  139<H>  4.1   |  22  |  1.16    Ca    9.3      10 Aug 2022 11:24                          12.0   10.97 )-----------( 326      ( 10 Aug 2022 11:24 )             37.9     Medications  MEDICATIONS  (STANDING):  apixaban 5 milliGRAM(s) Oral every 12 hours  aspirin  chewable 81 milliGRAM(s) Oral daily  atorvastatin 80 milliGRAM(s) Oral at bedtime  BACItracin   Ointment 1 Application(s) Topical two times a day  brivaracetam 50 milliGRAM(s) Oral two times a day  dextrose 50% Injectable 25 Gram(s) IV Push once  dextrose 50% Injectable 12.5 Gram(s) IV Push once  dextrose 50% Injectable 25 Gram(s) IV Push once  dextrose Oral Gel 15 Gram(s) Oral once  gabapentin 200 milliGRAM(s) Oral at bedtime  glucagon  Injectable 1 milliGRAM(s) IntraMuscular once  insulin glargine Injectable (LANTUS) 28 Unit(s) SubCutaneous at bedtime  insulin lispro (ADMELOG) corrective regimen sliding scale   SubCutaneous every 6 hours  insulin lispro Injectable (ADMELOG) 10 Unit(s) SubCutaneous three times a day before meals  metoprolol tartrate 25 milliGRAM(s) Oral two times a day  pantoprazole    Tablet 40 milliGRAM(s) Oral daily  psyllium Powder 1 Packet(s) Enteral Tube every 12 hours  senna 2 Tablet(s) Oral at bedtime      Physical Exam  General: Patient comfortable in bed  Vital Signs Last 12 Hrs  T(F): 98.4 (08-10-22 @ 12:47), Max: 98.4 (08-10-22 @ 12:47)  HR: 84 (08-10-22 @ 12:47) (58 - 84)  BP: 111/64 (08-10-22 @ 12:47) (106/61 - 111/64)  BP(mean): --  RR: 18 (08-10-22 @ 12:47) (18 - 18)  SpO2: 97% (08-10-22 @ 12:47) (97% - 98%)  Neck: No palpable thyroid nodules.  CVS: S1S2, No murmurs  Respiratory: No wheezing, no crepitations  GI: Abdomen soft, bowel sounds positive  Musculoskeletal:  edema lower extremities.   Skin: No skin rashes, no ecchymosis    Diagnostics    Diet, Regular:   Consistent Carbohydrate No Snacks (CSTCHO) (08-08 @ 15:11)  Metanephrine, Plasma: AM Sched. Collection: 21-Jul-2022 06:00 (07-20 @ 10:54)  Aldosterone, Serum: AM Sched. Collection: 21-Jul-2022 06:00 (07-20 @ 10:54)  Cortisol AM, Serum: AM Sched. Collection: 21-Jul-2022 06:00 (07-20 @ 10:54)           Chief complaint  Patient is a 82y old  Female who presents with a chief complaint of abnormal imaging (10 Aug 2022 13:32)   Review of systems  Patient in bed, looks comfortable, no hypoglycemic episodes.    Labs and Fingersticks  CAPILLARY BLOOD GLUCOSE      POCT Blood Glucose.: 136 mg/dL (10 Aug 2022 11:16)  POCT Blood Glucose.: 151 mg/dL (10 Aug 2022 07:27)  POCT Blood Glucose.: 185 mg/dL (09 Aug 2022 21:10)  POCT Blood Glucose.: 130 mg/dL (09 Aug 2022 16:21)      Anion Gap, Serum: 15 (08-10 @ 11:24)  Anion Gap, Serum: 12 (08-09 @ 06:20)  Anion Gap, Serum: 10 (08-08 @ 20:19)      Calcium, Total Serum: 9.3 (08-10 @ 11:24)  Calcium, Total Serum: 9.0 (08-09 @ 06:20)  Calcium, Total Serum: 9.1 (08-08 @ 20:19)          08-10    140  |  103  |  49<H>  ----------------------------<  139<H>  4.1   |  22  |  1.16    Ca    9.3      10 Aug 2022 11:24                          12.0   10.97 )-----------( 326      ( 10 Aug 2022 11:24 )             37.9     Medications  MEDICATIONS  (STANDING):  apixaban 5 milliGRAM(s) Oral every 12 hours  aspirin  chewable 81 milliGRAM(s) Oral daily  atorvastatin 80 milliGRAM(s) Oral at bedtime  BACItracin   Ointment 1 Application(s) Topical two times a day  brivaracetam 50 milliGRAM(s) Oral two times a day  dextrose 50% Injectable 25 Gram(s) IV Push once  dextrose 50% Injectable 12.5 Gram(s) IV Push once  dextrose 50% Injectable 25 Gram(s) IV Push once  dextrose Oral Gel 15 Gram(s) Oral once  gabapentin 200 milliGRAM(s) Oral at bedtime  glucagon  Injectable 1 milliGRAM(s) IntraMuscular once  insulin glargine Injectable (LANTUS) 28 Unit(s) SubCutaneous at bedtime  insulin lispro (ADMELOG) corrective regimen sliding scale   SubCutaneous every 6 hours  insulin lispro Injectable (ADMELOG) 10 Unit(s) SubCutaneous three times a day before meals  metoprolol tartrate 25 milliGRAM(s) Oral two times a day  pantoprazole    Tablet 40 milliGRAM(s) Oral daily  psyllium Powder 1 Packet(s) Enteral Tube every 12 hours  senna 2 Tablet(s) Oral at bedtime      Physical Exam  General: Patient comfortable in bed  Vital Signs Last 12 Hrs  T(F): 98.4 (08-10-22 @ 12:47), Max: 98.4 (08-10-22 @ 12:47)  HR: 84 (08-10-22 @ 12:47) (58 - 84)  BP: 111/64 (08-10-22 @ 12:47) (106/61 - 111/64)  BP(mean): --  RR: 18 (08-10-22 @ 12:47) (18 - 18)  SpO2: 97% (08-10-22 @ 12:47) (97% - 98%)  Neck: No palpable thyroid nodules.  CVS: S1S2, No murmurs  Respiratory: No wheezing, no crepitations  GI: Abdomen soft, bowel sounds positive  Musculoskeletal:  edema lower extremities.   Skin: No skin rashes, no ecchymosis    Diagnostics    Diet, Regular:   Consistent Carbohydrate No Snacks (CSTCHO) (08-08 @ 15:11)  Metanephrine, Plasma: AM Sched. Collection: 21-Jul-2022 06:00 (07-20 @ 10:54)  Aldosterone, Serum: AM Sched. Collection: 21-Jul-2022 06:00 (07-20 @ 10:54)  Cortisol AM, Serum: AM Sched. Collection: 21-Jul-2022 06:00 (07-20 @ 10:54)

## 2022-08-10 NOTE — PROGRESS NOTE ADULT - ASSESSMENT
81 y/o female (former smoker) with PMHx of HTN, CAD, HLD, MI, peripheral neuropathy, DM and peripheral artery disease presented to the ED for imaging for her head due to abnormal spine MRI by her orthopedist (Dr. Castro) during workup for her spine for her chronic back problems Had gait/balance issues since April . . Work up revealed brai  mass. 04/2022 she has had gait/balance issues & feels drunk once sits up for a while . MR brain 7/20/22 with Right temporal lobe peripherally enhancing, centrally necrotic 3.1 x 2.6 cm mass with surrounding vasogenic edema. 8 mm leftward midline shift. Right uncal herniation. Additional left parafalcine 1.3 x 0.9 cm rim-enhancing cystic lesion with mild surrounding vasogenic edema. Tiny 0.3 x 0.3 cm rim-enhancing lesion within the left frontal cortex concerning for metastatic disease .. 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      s/p Right craniotomy for resection of brain mass 7/27, path showing NSC carcinoma (favor adeno) post op STEMI  8/1, pt transferred to CICU- medical treatment On ASA/ heparin gtt & betablocker . no cath given high risk from comorbidities and recent surgery.    TTE 7/22 with normal LV internal dimensions with discrete upper septal hypertrophy. EF 55%, inferolateral wall is akinetic. 8/3 RML segmental PE   Pt noticed with hyperkalemia. Renal consulted. Received Lokelma 8/7 & 8/8  Off ARB since 8/4/22. NSCU consulted for encephalopathy 8/3/22  likely UTI/early urosepsis, Started on Zosyn. Mental status improved  EEG rule out seizures Right temporal sharp waves. No seizures .  Started on Briviact 8/3/22 New onset Afib w RVR 8/4/22 .Transferred to floor 8/6/22  PAT 150s on 8/7. Troponin remains  elevated.  Increased Metoprolol.     Plan    Neuro stable. On Brivact 50q12 Off steroids   Cards- Continue Metoprolol 25mg BID , ASA 81mg > Switched to Eliquis BID  Hyperkalemia resolved . Creatinine stable D/w renal   Type 2 DM- BS 200s. Continue Lantus 28U & premeal 10u   DVT/PE- Continue Eliquis . CBC stable   Outpatient oncology f/u for PET scan & plans for systemic therapy &  radiation plans   d/c to rehab placement

## 2022-08-10 NOTE — PROGRESS NOTE ADULT - SUBJECTIVE AND OBJECTIVE BOX
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; pulmonary embolism; STEMI; lethargy    INTERVAL HISTORY: looks well but overwhelmed; surgical staples removed; switched from heparin gtt -> Eliquis; transitioned from IV -> po anticonvulsants; awake and alert chatting with her family while sitting in the chair - speech is now fluent; no shortness of breath or hypoxemia on room air; no cough, sputum production, hemoptysis, chest congestion or wheeze; no fevers, chills or sweats; no chest pain/pressure or palpitations; s/p bronchoscopy with endobronchial brushings/biopsies of an obstructing left upper lobe endobronchial lesion -> 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; no seizures on EEG;  tolerating a regular diet;     REVIEW OF SYSTEMS:  Constitutional: As per interval history  HEENT: Within normal limits  CV: As per interval history  Resp: As per interval history  GI: dysphagia -> resolved   : Within normal limits  Musculoskeletal: Within normal limits  Skin: Within normal limits  Neurological: lethargy -> resolved  Psychiatric: Within normal limits  Endocrine: Within normal limits  Hematologic/Lymphatic: 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  Allergic/Immunologic: Within normal limits    MEDICATIONS:     Pulmonary "    Anti-microbials:    Cardiovascular:  metoprolol tartrate 25 milliGRAM(s) Oral two times a day    Other:  apixaban 5 milliGRAM(s) Oral every 12 hours  aspirin  chewable 81 milliGRAM(s) Oral daily  atorvastatin 80 milliGRAM(s) Oral at bedtime  BACItracin   Ointment 1 Application(s) Topical two times a day  brivaracetam 50 milliGRAM(s) Oral two times a day  dextrose 50% Injectable 25 Gram(s) IV Push once  dextrose 50% Injectable 12.5 Gram(s) IV Push once  dextrose 50% Injectable 25 Gram(s) IV Push once  dextrose Oral Gel 15 Gram(s) Oral once  gabapentin 200 milliGRAM(s) Oral at bedtime  glucagon  Injectable 1 milliGRAM(s) IntraMuscular once  insulin glargine Injectable (LANTUS) 28 Unit(s) SubCutaneous at bedtime  insulin lispro (ADMELOG) corrective regimen sliding scale   SubCutaneous every 6 hours  insulin lispro Injectable (ADMELOG) 10 Unit(s) SubCutaneous three times a day before meals  pantoprazole    Tablet 40 milliGRAM(s) Oral daily  psyllium Powder 1 Packet(s) Enteral Tube every 12 hours  senna 2 Tablet(s) Oral at bedtime    MEDICATIONS  (PRN):  melatonin 5 milliGRAM(s) Oral at bedtime PRN Sleep        OBJECTIVE:    POCT Blood Glucose.: 151 mg/dL (10 Aug 2022 07:27)  POCT Blood Glucose.: 185 mg/dL (09 Aug 2022 21:10)  POCT Blood Glucose.: 130 mg/dL (09 Aug 2022 16:21)  POCT Blood Glucose.: 221 mg/dL (09 Aug 2022 11:46)      PHYSICAL EXAM:       ICU Vital Signs Last 24 Hrs  T(C): 36.7 (10 Aug 2022 09:12), Max: 37 (10 Aug 2022 01:02)  T(F): 98 (10 Aug 2022 09:12), Max: 98.6 (10 Aug 2022 01:02)  HR: 58 (10 Aug 2022 09:12) (58 - 74)  BP: 106/61 (10 Aug 2022 09:12) (95/64 - 124/78)  BP(mean): --  ABP: --  ABP(mean): --  RR: 18 (10 Aug 2022 09:12) (18 - 18)  SpO2: 98% (10 Aug 2022 09:12) (97% - 98%) on room air    General: Awake. Alert. Cooperative. No distress. Appears stated age. Sitting in the chair  HEENT:  Healing right craniotomy incision. Staples removed. Normocephalic. Anicteric. Normal oral mucosa. PERRL. EOMI.  Neck: Supple. Trachea midline. Thyroid without enlargement/tenderness/nodules. No carotid bruit. No JVD.	  Cardiovascular: Regular rate and rhythm. S1 S2 normal. No murmurs, rubs or gallops.  Respiratory: Respirations unlabored. Clear to auscultation and percussion bilaterally. No curvature.  Abdomen: Soft. Non-tender. Non-distended. No organomegaly. No masses. Normal bowel sounds.  Extremities: Warm to touch. No clubbing or cyanosis. No pedal edema.   Pulses: 2+ peripheral pulses all extremities.	  Skin: Craniotomy scar C/D/I. Abrasion with ulceration on the forehead at the site of the craniotomy dressing  Lymph Nodes: Cervical, supraclavicular and axillary nodes normal  Neurological: Moving all extremities. A and O x 3.  Psychiatry: Appropriate mood and affect.    LABS:                          10.8   13.59 )-----------( 268      ( 09 Aug 2022 06:20 )             33.2     CBC    WBC  13.59 <==, 12.58 <==, 11.75 <==, 13.06 <==, 12.70 <==, 16.18 <==, 17.41 <==    Hemoglobin  10.8 <<==, 10.3 <<==, 10.2 <<==, 10.6 <<==, 10.6 <<==, 14.0 <<==, 12.4 <<==    Hematocrit  33.2 <==, 32.0 <==, 31.8 <==, 32.9 <==, 33.4 <==, 44.3 <==, 38.9 <==    Platelets  268 <==, 240 <==, 223 <==, 248 <==, 228 <==, 229 <==, 219 <==      138  |  103  |  49<H>  ----------------------------<  209<H>    08-09  4.8   |  23  |  1.04      LYTES    sodium  138 <==, 138 <==, 138 <==, 137 <==, 137 <==, 139 <==, 141 <==    potassium   4.8 <==, 5.2 <==, 5.0 <==, 5.8 <==, 5.7 <==, 5.4 <==, 4.5 <==    chloride  103 <==, 103 <==, 102 <==, 102 <==, 103 <==, 104 <==, 103 <==    carbon dioxide  23 <==, 25 <==, 27 <==, 27 <==, 26 <==, 25 <==, 27 <==    =============================================================================================  RENAL FUNCTION:    Creatinine:   1.04  <<==, 1.21  <<==, 0.98  <<==, 1.09  <<==, 1.05  <<==, 1.04  <<==, 1.01  <<==    BUN:   49 <==, 50 <==, 50 <==, 49 <==, 49 <==, 46 <==, 46 <==    ============================================================================================    calcium   9.0 <==, 9.1 <==, 9.2 <==, 9.0 <==, 8.9 <==, 9.1 <==, 8.9 <==    phos   4.0 <==, 4.6 <==, 5.3 <==    mag   2.4 <==, 2.5 <==, 1.8 <==    ============================================================================================  LFTs    AST:   22 <== , 37 <== , 48 <==     ALT:  26  <== , 22  <== , 17  <==     AP:  63  <=, 79  <=, 57  <=    Bili:  0.2  <=, 0.5  <=, 0.4  <=    PTT - ( 09 Aug 2022 06:20 )  PTT:65.9 sec    PT/INR - ( 04 Aug 2022 21:53 )   PT: 11.4 sec;   INR: 0.99 ratio       PTT - ( 09 Aug 2022 06:20 )  PTT:65.9 sec      Serum Pro-Brain Natriuretic Peptide: 4009 pg/mL ( @ 21:53)    CARDIAC MARKERS ( 08 Aug 2022 20:19 )  CPK 40 U/L /CKMB x     /CKMB Units x        troponin x        CARDIAC MARKERS ( 07 Aug 2022 20:41 )  CPK x     /CKMB x     /CKMB Units x        troponin 3489 ng/L    CARDIAC MARKERS ( 04 Aug 2022 21:53 )  CPK x     /CKMB x     /CKMB Units x        troponin 1633 ng/L    CARDIAC MARKERS ( 04 Aug 2022 15:08 )  CPK x     /CKMB x     /CKMB Units x        troponin 1535 ng/L    < from: Limited Transthoracic Echo (w/Cont) (22 @ 15:43) >    Patient name: LANIE BERTRAND  YOB: 1940   Age: 82 (F)   MR#: [id number]  Study Date: 2022  ------------------------------------------------------------------------  Dimensions:    Normal Values:  LA:            2.0 - 4.0 cm  Ao:            2.0 - 3.8 cm  SEPTUM:        0.6 - 1.2 cm  PWT:   0.7    0.6 - 1.1 cm  LVIDd:  3.3    3.0 - 5.6 cm  LVIDs:  2.7    1.8 - 4.0 cm  Derived variables:  RWT: 0.46  Fractional short: 16 %  EF (Visual Estimate): 60-65 %  ------------------------------------------------------------------------  Observations:  Aortic Valve/Aorta:  Left Ventricle: Endocardial visualization enhanced with  intravenous injection of Ultrasonic Enhancing Agent  (Lumason). Mild segmental left ventricular systolic  dysfunction. No left ventricular thrombus. The basal  inferolateral wall, the basal inferior wall, the basal  anterolateral wall, and the mid inferior wall are  hypokinetic.  Right Heart: Normal right ventricular size and function.  Hemodynamic: Estimated right atrial pressure is 8 mm Hg.  ------------------------------------------------------------------------  Conclusions:  1. Endocardial visualization enhanced with intravenous  injection of Ultrasonic Enhancing Agent (Lumason). Mild  segmental left ventricular systolic dysfunction. No left  ventricular thrombus. The basal  inferolateral wall, the  basal inferior wall, the basal anterolateral wall, and the  mid inferior wall are hypokinetic.  *** Compared with echocardiogram of 2022, the apical  akinesis in not seen in the current study.  ------------------------------------------------------------------------  Confirmed on  2022 - 19:12:19 by Pedro Luis Lorenzo M.D.  ------------------------------------------------------------------------  ---------------------------------------------------------------------------------------------------------------  < from: Limited Transthoracic Echo (w/Cont) (22 @ 08:02) >    Patient name: LANIE BERTRAND  YOB: 1940   Age: 82 (F)   MR#: [id number]  Study Date: 2022  ------------------------------------------------------------------------  Conclusions:  1. Endocardial visualization enhanced with intravenous  injection of Ultrasonic Enhancing Agent (Lumason). Left  ventricle not well visualized despite intravenous  ultrasonic enhancing agent; grossly, preserved left  ventricular systolic function with segmental wall motion  abnormalities. EF approximately 55%. The mid to distal  inferolateral and mid to distal inferior segments are  hypkinetic. The apex is akinetic. No left ventricular  thrombus seen.  *** Compared with echocardiogram of 2022, results are  similar on today's study. The mid to distal inferior and  the apex appear hypo/akinetic on today's study on images  with intravenous ultrasonic enhancing agent.  ------------------------------------------------------------------------  Confirmed on  2022 - 10:57:19 by Kelsie Becker M.D.  ------------------------------------------------------------------------  ------------------------------------------------------------------------  Surgical Pathology Report (22 @ 11:53)   Surgical Pathology Report:   1. Left mainstem bronchus biopsy   Final Diagnosis   Bronchus, mainstem, left, biopsy:   In summary the morphology and immunophenotype are consistent with poorly   differentiated adenocarcinoma of lung origin.   Dr. Hopkins was notified of the diagnosis on 2022.   ------------------------------------------------------------------------------  MICROBIOLOGY:   Urinalysis Basic - ( 03 Aug 2022 08:40 )    Color: Light Yellow / Appearance: Clear / S.015 / pH: x  Gluc: x / Ketone: Trace  / Bili: Negative / Urobili: Negative   Blood: x / Protein: Negative / Nitrite: Negative   Leuk Esterase: Large / RBC: 1 /hpf / WBC 51 /HPF   Sq Epi: x / Non Sq Epi: 0 /hpf / Bacteria: Many    Culture - Urine (22 @ 13:05)   Specimen Source: Catheterized Catheterized   Culture Results:   >100,000 CFU/ml Escherichia coli   >100,000 CFU/ml Klebsiella pneumoniae     Culture - Blood (22 @ 10:59)   Specimen Source: .Blood Blood   Culture Results:   No growth to date.     Culture - Blood (22 @ 10:58)   Specimen Source: .Blood Blood   Culture Results:   No growth to date.     MRSA/MSSA PCR (22 @ 07:20)   MRSA PCR Result.: NotDetec  Staph aureus PCR Result: NotDetec   RADIOLOGY:    [facility]                          PROCEDURE DATE:  2022      FINDINGS:    PULMONARY ANGIOGRAM: Pulmonary embolism in the lateral segmental branch   of the right middle lobe without evidence of right heart strain. Multiple   segmental branches in the left lung are obscured by motion.    LYMPH NODES: Stable 1.2 cm left hilar lymph node.    HEART/VASCULATURE: The heart is normal in size. No pericardial effusion.   There are aortic, aortic valve, and coronary artery calcifications.    AIRWAYS/LUNGS/PLEURA: Patent central airways. Left upper lobe mass   measuring 2.7 x 2.1 cm, series 5 image 50, decreased in size from prior,   and unchanged obstruction left apical posterior bronchus, and associated   segmental atelectasis along the mediastinum. No pleural effusion or   pneumothorax.    UPPER ABDOMEN: Within normal limits.    BONES/SOFT TISSUES: Stable multinodular thyroid. Metallic hardware in the   right humeral head and shaft. Degenerative changes of the spine.    IMPRESSION:  Pulmonary embolism in the lateral segmental branch of the right middle   lobe. No evidence of right heart strain.    Interval decrease in left upper lobe mass. Unchanged obstruction of the   posterior bronchus, and stable fibroglandular lymph node.    COMMUNICATION:  Discussed positive PE findings with [facility] DAVID Poon by Dr. Michelle Rebollar at 12:15 PM on 8/3/2022.    MICHELLE JAUREGUI MD; Resident Radiologist  This document has been electronically signed.  NEHA BAILON M.D., ATTENDINGRADIOGIST  This document has been electronically signed. Aug  3 2022 12:35PM  ---------------------------------------------------------------------------------------------------------------  EXAM:  CT BRAIN                          PROCEDURE DATE:  2022      IMPRESSION:    Redemonstration of right temporal craniotomy for prior resection of a   lesion. There is a large degree of hypoattenuation throughout the right   temporal lobe and extending into the right basal ganglia and right   parietal lobe, suggestive of a combination of vasogenic edema and/or   encephalomalacia/gliosis. This results in secondary mass effect upon the   parenchyma of the right cerebral hemisphere, not significantly changed.   There is mild mass effect on the ventricular system, right greater than   left. There is secondary right to left midline shift of approximately 5.7   mm. Expected postsurgical extra-axial pneumocephalus is reidentified   without significant change. Tiny expected subdural hematoma overlying the   right anterior superior frontal lobe is reidentified without change.    Reidentified is a hypoattenuating lesion within the left medial frontal   lobe measuring 1.4 x 1.6 cm, compatible with known additional metastasis.   No significant surrounding edema.    NERI CERNA MD; Resident Radiologist  This document has been electronically signed.  ALTAF SEBASTIAN MD; Attending Radiologist  This document has been electronically signed. Aug  3 2022 10:23AM  ---------------------------------------------------------------------------------------------------------------  EXAM:  CT ABDOMEN AND PELVIS IC                        EXAM:  CT CHEST IC                          PROCEDURE DATE:  2022      FINDINGS:  CHEST:  LUNGS AND LARGE AIRWAYS: There is a heterogeneous left upper lobe mass   approximately measuring 3.3 x 2.6 cm (2, 28) obstructing the left apical   posterior bronchus with partial atelectasis of the left upper lobe. Few   scattered bilateral pulmonary nodules measuring up to 3 mm in the right   upper lobe (2, 39).  PLEURA: No pleural effusion.  VESSELS: Atherosclerotic changes of the aorta and coronary arteries.  HEART: Heart size is normal. No pericardial effusion.  MEDIASTINUM AND SIXTO: Soft tissue contiguous with mass versus adenopathy   measuring 1.7 x 1.5 cm (2, 33).  CHEST WALL AND LOWER NECK: Bilateral subcentimeter hypoattenuating   thyroid nodules.    ABDOMEN AND PELVIS:  LIVER: Within normal limits.  BILE DUCTS: Normal caliber.  GALLBLADDER: Within normal limits.  SPLEEN: Within normal limits.  PANCREAS: Within normal limits.  ADRENALS: Indeterminant left adrenal nodule measuring 1.2 cm.  KIDNEYS/URETERS: No hydronephrosis. Renal cysts and subcentimeter   hypoattenuating foci bilaterally, too small to characterize.    BLADDER: Within normal limits.  REPRODUCTIVE ORGANS: Uterus and adnexa within normal limits.    BOWEL: Colonic diverticula withoutevidence of acute diverticulitis. No   bowel obstruction. Appendix is normal.  PERITONEUM: No ascites.  VESSELS: Atherosclerotic changes.  RETROPERITONEUM/LYMPH NODES: No lymphadenopathy.  ABDOMINAL WALL: Within normal limits.  BONES: Degenerative changes. Grade 1 anterolisthesis of L5 on S1. Status   post right shoulder arthroplasty.    IMPRESSION:  Left upper lobe lung mass with partial atelectasis of the left upper   lobe, concerning for primary lung neoplasm. Few scattered sub-4 mm   pulmonary nodules, indeterminate.    Indeterminant left adrenal nodule for which contrast enhanced MRI is   recommended for further evaluation.    DEEPAK MORALES MD; Resident Radiologist  This document has been electronically signed.  HAYLEY DENTON MD; Attending Radiologist  This document has been electronically signed. 2022  9:02AM  ---------------------------------------------------------------------------------------------------------------  EXAM:  DUPLEX SCAN EXT VEINS LOWER BI                          PROCEDURE DATE:  2022      IMPRESSION:  No evidence of deep venous thrombosis in either lower extremity.    TISH VIDAL MD; Attending Radiologist  This document has been electronically signed. Aug  7 2022  1:05PM  --------------------------------------------------------------------------------------------------------------  EXAM:  US ABDOMEN COMPLETE                          PROCEDURE DATE:  2022      FINDINGS:  Liver: Increased echogenicity. Smooth contour.  Bile ducts: Normal caliber. Common bile duct measures 3 mm.  Gallbladder: Mildly distended. No stones. No sludge. Negative near the   sonographic Rivera's sign.  Pancreas: Some degree of atrophy. Limited visualization due to bowel gas.  Spleen: 9.9 cm. Within normal limits.  Right kidney: 9.4cm. Possible mild hydronephrosis including a mildly   dilated renal pelvis.  Left kidney: 11.4 cm. No hydronephrosis. Several small cysts lower pole   cyst measuring 1.1 x 1.1 x 0.9 cm at the lower pole.  Ascites: None.  Aorta and IVC: Aorta with atherosclerotic changes. IVC is patent..    IMPRESSION:    Mildly distended gallbladder. No evidence of acute cholecystitis.    Possible mild hydronephrosis.    SOCORRO CARRION MD; Resident Radiologist  This document has been electronically signed.  KUSUM OSBORNE MD; Attending Radiologist  This document has been electronically signed. Aug  4 2022  4:05PM  ---------------------------------------------------------------------------------------------------------------

## 2022-08-10 NOTE — PROGRESS NOTE ADULT - PROBLEM SELECTOR PLAN 2
FU outpatient for yearly monitoring.
Suggest to FU as out patient.
FU outpatient for yearly monitoring.
Suggest to FU as out patient.
FU outpatient for yearly monitoring.
Suggest to FU as out patient.
FU outpatient for yearly monitoring.
FU outpatient for yearly monitoring.
Suggest to FU as out patient.
Suggest to FU as out patient.
FU outpatient for yearly monitoring.

## 2022-08-10 NOTE — H&P ADULT - ATTENDING COMMENTS
Progress note amended to include my discussions with patient, resident, hospitalist.    Patient pleasant female, alert, grossly O x 3, RH dominant and to situation but has difficult time giving chronologic history as well as specifics. "I had a hard time" "I was off" "that's why I came in". She reports incidences where she had difficulty in transferring from sitting to standing in circumlocutory manner, and was unable to specify whether it was due to balance or weakness, only that she felt "off". She denies any history of falls. She reports living alone and was independent in household chores and ambulation prior to coming in. She knows that she had a procedure to her lung and brain surgery, but was unable to connect the two to a diagnosis which was discussed.    Follows 1-2 step verbal commands. Eyes open spontaneously and good simple attention, mild fatigue and fair+ sustained attention. mood stable and pleasant. very mild dysarthria, EOMI, PERRL, trace facial droop left side, tongue midline. Able to name "pen" "glove" "knife" but when asked what glove is for, unable to say. Has difficulty word finding in sentence level. motor 5/5 BUe and LE with normal tone, no calf swellign or pedal edema. no sensory deficits LT    Vitals and lbas reviewed as below. Slgiht elevation WBC 11.78 from 10.5, possibly reactive, afebrile, no symptoms. will monitor, CBC in AM 8/12. Also mild RI, encourage po fluids, BUn/C4 53/1.36, ordered BMP in AM. Primafit ordered for nighttime incontinence, but should focus on toileting program    RECENT LABS    Vital Signs Last 24 Hrs  T(C): 36.3 (11 Aug 2022 10:40), Max: 36.7 (10 Aug 2022 21:52)  T(F): 97.4 (11 Aug 2022 10:40), Max: 98 (10 Aug 2022 21:52)  HR: 57 (11 Aug 2022 10:40) (57 - 77)  BP: 80/53 (11 Aug 2022 10:40) (80/53 - 118/74)  BP(mean): --  RR: 16 (11 Aug 2022 10:40) (16 - 16)  SpO2: 97% (11 Aug 2022 10:40) (96% - 98%)    Parameters below as of 11 Aug 2022 10:40  Patient On (Oxygen Delivery Method): room air                              11.7   11.78 )-----------( 345      ( 11 Aug 2022 06:30 )             35.7     08-11    140  |  104  |  53<H>  ----------------------------<  222<H>  4.6   |  24  |  1.36<H>    Ca    9.2      11 Aug 2022 06:30    TPro  6.2  /  Alb  2.3<L>  /  TBili  0.2  /  DBili  x   /  AST  15  /  ALT  28  /  AlkPhos  73  08-11        CAPILLARY BLOOD GLUCOSE      POCT Blood Glucose.: 189 mg/dL (11 Aug 2022 12:14)  POCT Blood Glucose.: 212 mg/dL (11 Aug 2022 08:05)  POCT Blood Glucose.: 144 mg/dL (10 Aug 2022 22:36)  POCT Blood Glucose.: 147 mg/dL (10 Aug 2022 17:10)  POCT Blood Glucose.: 164 mg/dL (10 Aug 2022 16:33)

## 2022-08-10 NOTE — PROGRESS NOTE ADULT - PROBLEM SELECTOR PROBLEM 3
Adrenal nodule

## 2022-08-10 NOTE — H&P ADULT - NSICDXPASTMEDICALHX_GEN_ALL_CORE_FT
PAST MEDICAL HISTORY:  Chronic back pain     Coronary artery disease     Diabetes     Former smoker     Hyperlipidemia     Hypertension     Myocardial infarct     Peripheral artery disease     Peripheral neuropathy

## 2022-08-10 NOTE — PROGRESS NOTE ADULT - PROBLEM SELECTOR PLAN 1
Blood sugars may trend down 2/2 steroid DC.  Will decrease Lantus to 24u at bedtime.  Will decrease Admelog to 8u before each meal and continue Admelog correction scale coverage. Will continue monitoring FS and FU.  Suggest DC to rehab on current basal bolus insulin, discontinue prior Glimepiride. FU 4 weeks.    for compliance with consistent low carb diet.

## 2022-08-10 NOTE — PROGRESS NOTE ADULT - PROBLEM SELECTOR PROBLEM 2
Multiple thyroid nodules

## 2022-08-10 NOTE — PROGRESS NOTE ADULT - REASON FOR ADMISSION
abnormal imaging
brain tumor
abnormal imaging
Brain tumor
STEMI
abnormal imaging
brain tumor
abnormal imaging

## 2022-08-10 NOTE — PROGRESS NOTE ADULT - SUBJECTIVE AND OBJECTIVE BOX
Patient is a 82y old  Female who presents with a chief complaint of abnormal imaging (09 Aug 2022 19:12)   patient seen and daughter at bedside. says that she feels okay. No HA, dizziness, CP, SOB, cough, hemoptysis, N/V/D, abd pain, bleeding. has weakness and awaiting rehab. Appetite is good.     Medication:   apixaban 5 milliGRAM(s) Oral every 12 hours  aspirin  chewable 81 milliGRAM(s) Oral daily  atorvastatin 80 milliGRAM(s) Oral at bedtime  BACItracin   Ointment 1 Application(s) Topical two times a day  brivaracetam 50 milliGRAM(s) Oral two times a day  dextrose 50% Injectable 25 Gram(s) IV Push once  dextrose 50% Injectable 12.5 Gram(s) IV Push once  dextrose 50% Injectable 25 Gram(s) IV Push once  dextrose Oral Gel 15 Gram(s) Oral once  gabapentin 200 milliGRAM(s) Oral at bedtime  glucagon  Injectable 1 milliGRAM(s) IntraMuscular once  insulin glargine Injectable (LANTUS) 28 Unit(s) SubCutaneous at bedtime  insulin lispro (ADMELOG) corrective regimen sliding scale   SubCutaneous every 6 hours  insulin lispro Injectable (ADMELOG) 10 Unit(s) SubCutaneous three times a day before meals  melatonin 5 milliGRAM(s) Oral at bedtime PRN  metoprolol tartrate 25 milliGRAM(s) Oral two times a day  pantoprazole    Tablet 40 milliGRAM(s) Oral daily  psyllium Powder 1 Packet(s) Enteral Tube every 12 hours  senna 2 Tablet(s) Oral at bedtime      Physical exam    T(C): 36.7 (08-10-22 @ 09:12), Max: 37 (08-10-22 @ 01:02)  HR: 58 (08-10-22 @ 09:12) (58 - 74)  BP: 106/61 (08-10-22 @ 09:12) (95/64 - 124/78)  RR: 18 (08-10-22 @ 09:12) (18 - 18)  SpO2: 98% (08-10-22 @ 09:12) (97% - 98%)  Wt(kg): --    alert NAD  EOMI anicteric sclera  Cv s1 S2 RRR  Lungs clear B/L  abd soft NT ND +BS  No LE edema or tenderness    Labs                        10.8   13.59 )-----------( 268      ( 09 Aug 2022 06:20 )             33.2       08-09    138  |  103  |  49<H>  ----------------------------<  209<H>  4.8   |  23  |  1.04    Ca    9.0      09 Aug 2022 06:20            [id number]

## 2022-08-10 NOTE — PROGRESS NOTE ADULT - PROBLEM SELECTOR PROBLEM 5
Lung mass

## 2022-08-10 NOTE — PROGRESS NOTE ADULT - ASSESSMENT
Assessment  DMT2: 82y Female with DM T2 with hyperglycemia, A1C 8.4%, was on oral meds and insulin at home, now on basal bolus insulin, increased dose yesterday, blood sugars improved and trending within acceptable range today, no hypoglycemic episodes. NAD, eating meals with fair appetite, dexamethasone DC'd, planning DC to rehab.  Brain Mass: on meds, monitored, FU Neurosurgery.  Lung Mass: for bronchoscopy, monitored.  Thyroid Nodules: seen on imaging, B/L subcentimeter thyroid nodules, she denies neck mass/pain/dysphagia, euthyroid, reports known history and is being followed by Dr. Richardson.  Adrenal Nodule: seen on imaging, 1.2 cm left thyroid nodule, unknown history.             Assessment  DMT2: 82y Female with DM T2 with hyperglycemia, A1C 8.4%, was on oral meds and insulin at home, now on basal bolus insulin,  increased dose yesterday, blood sugars improved and trending within acceptable range today, no hypoglycemic episodes. NAD, eating meals with fair appetite, dexamethasone DC'd, planning DC to rehab.  Brain Mass: on meds, monitored, FU Neurosurgery.  Lung Mass: for bronchoscopy, monitored.  Thyroid Nodules: seen on imaging, B/L subcentimeter thyroid nodules, she denies neck mass/pain/dysphagia, euthyroid, reports known history and is being followed by Dr. Richardson.  Adrenal Nodule: seen on imaging, 1.2 cm left thyroid nodule, unknown history.

## 2022-08-10 NOTE — PROGRESS NOTE ADULT - NS ATTEND AMEND GEN_ALL_CORE FT
I have made amendments to the documentation where necessary. Additional comments: I have made amendments to the documentation where necessary. Additional comments: I have made amendments to the documentation where necessary. Additional comments: I attest my time as attending is greater than 50% of the total combined time spent on qualifying patient care activities by the PA/NP and attending.     I have made amendments to the documentation where necessary. Additional comments: Pt seen and examined, plan of care discussed with the NP, spent 30 min on the case.
I have made amendments to the documentation where necessary. Additional comments: I have made amendments to the documentation where necessary. Additional comments: I have made amendments to the documentation where necessary. Additional comments: I have made amendments to the documentation where necessary. Additional comments: I have made amendments to the documentation where necessary. Additional comments: I attest my time as attending is greater than 50% of the total combined time spent on qualifying patient care activities by the PA/NP and attending.
I have made amendments to the documentation where necessary. Additional comments: I have made amendments to the documentation where necessary. Additional comments: I have made amendments to the documentation where necessary. Additional comments: I have made amendments to the documentation where necessary. Additional comments: I have made amendments to the documentation where necessary. Additional comments: I have made amendments to the documentation where necessary. Additional comments: I attest my time as attending is greater than 50% of the total combined time spent on qualifying patient care activities by the PA/NP and attending.
I have made amendments to the documentation where necessary. Additional comments: I have made amendments to the documentation where necessary. Additional comments: have made amendments to the documentation where necessary. Additional comments: I have made amendments to the documentation where necessary. Additional comments: I have made amendments to the documentation where necessary. Additional comments: I have made amendments to the documentation where necessary. Additional comments: I attest my time as attending is greater than 50% of the total combined time spent on qualifying patient care activities by the PA/NP and attending.
I have made amendments to the documentation where necessary. Additional comments: have made amendments to the documentation where necessary. Additional comments: I have made amendments to the documentation where necessary. Additional comments: I have made amendments to the documentation where necessary. Additional comments: I have made amendments to the documentation where necessary. Additional comments: I attest my time as attending is greater than 50% of the total combined time spent on qualifying patient care activities by the PA/NP and attending.
have made amendments to the documentation where necessary. Additional comments: have made amendments to the documentation where necessary. Additional comments: I have made amendments to the documentation where necessary. Additional comments: I have made amendments to the documentation where necessary. Additional comments: I have made amendments to the documentation where necessary. Additional comments: I attest my time as attending is greater than 50% of the total combined time spent on qualifying patient care activities by the PA/NP and attending.
I have made amendments to the documentation where necessary. Additional comments: I have made amendments to the documentation where necessary. Additional comments: I have made amendments to the documentation where necessary. Additional comments: I have made amendments to the documentation where necessary. Additional comments: I attest my time as attending is greater than 50% of the total combined time spent on qualifying patient care activities by the PA/NP and attending.
Patient seen and examined today at bedside. Chart, labs, vitals, radiology reviewed. Above H&P reviewed and edited where appropriate. Agree with history and physical exam. Agree with assessment and plan. I reviewed the overnight course of events and discussed the care with the patient and their family  35 minutes spent on total encounter of which more than fifty percent of the encounter was spent counseling and/or coordinating care by the attending physician.
have made amendments to the documentation where necessary. Additional comments: I have made amendments to the documentation where necessary. Additional comments: I have made amendments to the documentation where necessary. Additional comments: I have made amendments to the documentation where necessary. Additional comments: I attest my time as attending is greater than 50% of the total combined time spent on qualifying patient care activities by the PA/NP and attending.
I have made amendments to the documentation where necessary. Additional comments: I have made amendments to the documentation where necessary. Additional comments: I attest my time as attending is greater than 50% of the total combined time spent on qualifying patient care activities by the PA/NP and attending.
I have made amendments to the documentation where necessary. Additional comments: I have made amendments to the documentation where necessary. Additional comments: I have made amendments to the documentation where necessary. Additional comments: I attest my time as attending is greater than 50% of the total combined time spent on qualifying patient care activities by the PA/NP and attending.
Patient seen and examined today at bedside. Chart, labs, vitals, radiology reviewed. Above H&P reviewed and edited where appropriate. Agree with history and physical exam. Agree with assessment and plan. I reviewed the overnight course of events and discussed the care with the patient and their family  35 minutes spent on total encounter of which more than fifty percent of the encounter was spent counseling and/or coordinating care by the attending physician.
Patient seen and examined today at bedside. Chart, labs, vitals, radiology reviewed. Above H&P reviewed and edited where appropriate. Agree with history and physical exam. Agree with assessment and plan. I reviewed the overnight course of events and discussed the care with the patient and their family  35 minutes spent on total encounter of which more than fifty percent of the encounter was spent counseling and/or coordinating care by the attending physician.
have made amendments to the documentation where necessary. Additional comments: I have made amendments to the documentation where necessary. Additional comments: I have made amendments to the documentation where necessary. Additional comments: I have made amendments to the documentation where necessary. Additional comments: I attest my time as attending is greater than 50% of the total combined time spent on qualifying patient care activities by the PA/NP and attending.
I have made amendments to the documentation where necessary. Additional comments: I have made amendments to the documentation where necessary. Additional comments: I have made amendments to the documentation where necessary. Additional comments: I attest my time as attending is greater than 50% of the total combined time spent on qualifying patient care activities by the PA/NP and attending.     I have made amendments to the documentation where necessary. Additional comments: Pt seen and examined, plan of care discussed with the NP, spent 30 min on the case.
I have made amendments to the documentation where necessary. Additional comments: I have made amendments to the documentation where necessary. Additional comments: I have made amendments to the documentation where necessary. Additional comments: I have made amendments to the documentation where necessary. Additional comments: I attest my time as attending is greater than 50% of the total combined time spent on qualifying patient care activities by the PA/NP and attending.

## 2022-08-10 NOTE — H&P ADULT - NSHPREVIEWOFSYSTEMS_GEN_ALL_CORE
REVIEW OF SYSTEMS  Constitutional: No fever, No Chills, No fatigue  HEENT: No eye pain, No visual disturbances, No difficulty hearing, No Dysphagia   Pulm: No cough,  No shortness of breath  Cardio: No chest pain, No palpitations  GI:  No abdominal pain, No nausea, No vomiting,  No incontinence, constipation with Last Bowel Movement on 8/5/22  : No dysuria, No frequency, No hematuria, No incontinence  Neuro: No headaches, No memory loss, No loss of strength, No numbness, No tremors  Skin: Stage 1 ulcer on left heel and right buttock, ,cranial staples c/d/i. Forehead pressure injury open blister area with granulation tissue   Endo: No temperature intolerance  MSK: No joint pain, No joint swelling, No muscle pain, No Neck or back pain  Psych:  No depression, No anxiety REVIEW OF SYSTEMS  Constitutional: No fever, No Chills, No fatigue  HEENT: No eye pain, No visual disturbances, No difficulty hearing, No Dysphagia   Pulm: No cough,  No shortness of breath  Cardio: No chest pain, No palpitations  GI:  No abdominal pain, No nausea, No vomiting,  No incontinence, constipation with Last Bowel Movement on 8/5/22  : No dysuria, No frequency, No hematuria, No incontinence  Neuro: No headaches, No memory loss, No loss of strength, No numbness, No tremors  Skin: Stage 1 pressure ulcer on left heel and right buttock, ,cranial staples c/d/i. Forehead  open blister area with granulation tissue   Endo: No temperature intolerance  MSK: No joint pain, No joint swelling, No muscle pain, No Neck or back pain  Psych:  No depression, No anxiety REVIEW OF SYSTEMS  Constitutional: No fever, No Chills, RH dominant +mild fatigue  HEENT: No eye pain, No visual disturbances, No difficulty hearing, No Dysphagia Denies H/A  Pulm: No cough,  No shortness of breath  Cardio: No chest pain, No palpitations  GI:  No abdominal pain, No nausea, No vomiting,  No incontinence, constipation with Last Bowel Movement on 8/5/22  : No dysuria, No frequency, No hematuria, +nocturia and nighttime functional incontinence  Neuro: No headaches, + memory loss, No loss of strength, No numbness, No tremors +difficulty walking  Skin: Stage 1 pressure ulcer on left heel and right buttock, ,cranial staples c/d/i. Forehead  open blister area with granulation tissue     MSK: No joint pain, No joint swelling, No muscle pain, No Neck or back pain  Psych:  No depression, No anxiety

## 2022-08-10 NOTE — PATIENT PROFILE ADULT - FALL HARM RISK - HARM RISK INTERVENTIONS

## 2022-08-10 NOTE — PROGRESS NOTE ADULT - PROBLEM SELECTOR PLAN 3
No active issues or management.
Adrenal labs pending; Will continue monitoring labs and FU.
No active issues or management.
FU outpatient for yearly monitoring.
FU outpatient for yearly monitoring.
Adrenal labs pending; Will continue monitoring labs and FU.
FU outpatient for yearly monitoring.
FU outpatient for yearly monitoring.
Adrenal labs pending; Will continue monitoring labs and FU.
Adrenal labs pending; Will continue monitoring labs and FU.
No active issues or management.
FU outpatient for yearly monitoring.
Adrenal labs pending; Will continue monitoring labs and FU.
FU outpatient for yearly monitoring.
Adrenal labs pending; Will continue monitoring labs and FU.
No active issues or management.
No active issues or management.
FU outpatient for yearly monitoring.
Adrenal labs pending; Will continue monitoring labs and FU.
FU outpatient for yearly monitoring.
Adrenal labs pending; Will continue monitoring labs and FU.

## 2022-08-10 NOTE — PROGRESS NOTE ADULT - ASSESSMENT
Stage IV NSCLC, adenocarcinoma, including brain metastases. S/p craniotomy and recovering. She is weak and awaiting rehab. Awaiting foundation testing rsults but PDL-1 TPS is 0 so not candidate for upfront single agent immunotherapy. plan for out-pt PET/CT scan, RT, and consideration for systemic therapy.    Anemia is an AOCD process due to malignancy and decreased EGFR. Hgb adequate at this time and can monitor.

## 2022-08-10 NOTE — PROGRESS NOTE ADULT - THIS PATIENT HAS THE FOLLOWING CONDITION(S)/DIAGNOSES ON THIS ADMISSION:
Cerebral Edema/Brain Compression / Herniation
None
Shock/Encephalopathy/Functional Quadriplegia/Cerebral Edema/Brain Compression / Herniation
Cerebral Edema
None
Cerebral Edema
None
None
Cerebral Edema
Encephalopathy/Functional Quadriplegia/Cerebral Edema
None
Cerebral Edema
Cerebral Edema
Functional Quadriplegia/Cerebral Edema
None

## 2022-08-11 LAB
ALBUMIN SERPL ELPH-MCNC: 2.3 G/DL — LOW (ref 3.3–5)
ALP SERPL-CCNC: 73 U/L — SIGNIFICANT CHANGE UP (ref 40–120)
ALT FLD-CCNC: 28 U/L — SIGNIFICANT CHANGE UP (ref 10–45)
ANION GAP SERPL CALC-SCNC: 12 MMOL/L — SIGNIFICANT CHANGE UP (ref 5–17)
ANISOCYTOSIS BLD QL: SLIGHT — SIGNIFICANT CHANGE UP
AST SERPL-CCNC: 15 U/L — SIGNIFICANT CHANGE UP (ref 10–40)
BASOPHILS # BLD AUTO: 0 K/UL — SIGNIFICANT CHANGE UP (ref 0–0.2)
BASOPHILS NFR BLD AUTO: 0 % — SIGNIFICANT CHANGE UP (ref 0–2)
BILIRUB SERPL-MCNC: 0.2 MG/DL — SIGNIFICANT CHANGE UP (ref 0.2–1.2)
BUN SERPL-MCNC: 53 MG/DL — HIGH (ref 7–23)
CALCIUM SERPL-MCNC: 9.2 MG/DL — SIGNIFICANT CHANGE UP (ref 8.4–10.5)
CHLORIDE SERPL-SCNC: 104 MMOL/L — SIGNIFICANT CHANGE UP (ref 96–108)
CO2 SERPL-SCNC: 24 MMOL/L — SIGNIFICANT CHANGE UP (ref 22–31)
CREAT SERPL-MCNC: 1.36 MG/DL — HIGH (ref 0.5–1.3)
EGFR: 39 ML/MIN/1.73M2 — LOW
EOSINOPHIL # BLD AUTO: 0.12 K/UL — SIGNIFICANT CHANGE UP (ref 0–0.5)
EOSINOPHIL NFR BLD AUTO: 1 % — SIGNIFICANT CHANGE UP (ref 0–6)
GLUCOSE BLDC GLUCOMTR-MCNC: 189 MG/DL — HIGH (ref 70–99)
GLUCOSE BLDC GLUCOMTR-MCNC: 206 MG/DL — HIGH (ref 70–99)
GLUCOSE BLDC GLUCOMTR-MCNC: 208 MG/DL — HIGH (ref 70–99)
GLUCOSE BLDC GLUCOMTR-MCNC: 212 MG/DL — HIGH (ref 70–99)
GLUCOSE SERPL-MCNC: 222 MG/DL — HIGH (ref 70–99)
HCT VFR BLD CALC: 35.7 % — SIGNIFICANT CHANGE UP (ref 34.5–45)
HGB BLD-MCNC: 11.7 G/DL — SIGNIFICANT CHANGE UP (ref 11.5–15.5)
LYMPHOCYTES # BLD AUTO: 1.77 K/UL — SIGNIFICANT CHANGE UP (ref 1–3.3)
LYMPHOCYTES # BLD AUTO: 15 % — SIGNIFICANT CHANGE UP (ref 13–44)
MANUAL SMEAR VERIFICATION: SIGNIFICANT CHANGE UP
MCHC RBC-ENTMCNC: 31.7 PG — SIGNIFICANT CHANGE UP (ref 27–34)
MCHC RBC-ENTMCNC: 32.8 GM/DL — SIGNIFICANT CHANGE UP (ref 32–36)
MCV RBC AUTO: 96.7 FL — SIGNIFICANT CHANGE UP (ref 80–100)
METAMYELOCYTES # FLD: 1 % — HIGH (ref 0–0)
MONOCYTES # BLD AUTO: 0.59 K/UL — SIGNIFICANT CHANGE UP (ref 0–0.9)
MONOCYTES NFR BLD AUTO: 5 % — SIGNIFICANT CHANGE UP (ref 2–14)
MYELOCYTES NFR BLD: 2 % — HIGH (ref 0–0)
NEUTROPHILS # BLD AUTO: 8.95 K/UL — HIGH (ref 1.8–7.4)
NEUTROPHILS NFR BLD AUTO: 75 % — SIGNIFICANT CHANGE UP (ref 43–77)
NEUTS BAND # BLD: 1 % — SIGNIFICANT CHANGE UP (ref 0–8)
NRBC # BLD: 0 /100 — SIGNIFICANT CHANGE UP (ref 0–0)
PLAT MORPH BLD: NORMAL — SIGNIFICANT CHANGE UP
PLATELET # BLD AUTO: 345 K/UL — SIGNIFICANT CHANGE UP (ref 150–400)
POLYCHROMASIA BLD QL SMEAR: SLIGHT — SIGNIFICANT CHANGE UP
POTASSIUM SERPL-MCNC: 4.6 MMOL/L — SIGNIFICANT CHANGE UP (ref 3.5–5.3)
POTASSIUM SERPL-SCNC: 4.6 MMOL/L — SIGNIFICANT CHANGE UP (ref 3.5–5.3)
PROT SERPL-MCNC: 6.2 G/DL — SIGNIFICANT CHANGE UP (ref 6–8.3)
RBC # BLD: 3.69 M/UL — LOW (ref 3.8–5.2)
RBC # FLD: 13 % — SIGNIFICANT CHANGE UP (ref 10.3–14.5)
RBC BLD AUTO: ABNORMAL
SODIUM SERPL-SCNC: 140 MMOL/L — SIGNIFICANT CHANGE UP (ref 135–145)
WBC # BLD: 11.78 K/UL — HIGH (ref 3.8–10.5)
WBC # FLD AUTO: 11.78 K/UL — HIGH (ref 3.8–10.5)

## 2022-08-11 PROCEDURE — 99223 1ST HOSP IP/OBS HIGH 75: CPT

## 2022-08-11 PROCEDURE — 93010 ELECTROCARDIOGRAM REPORT: CPT

## 2022-08-11 RX ORDER — BRIVARACETAM 25 MG/1
50 TABLET, FILM COATED ORAL EVERY 12 HOURS
Refills: 0 | Status: DISCONTINUED | OUTPATIENT
Start: 2022-08-11 | End: 2022-08-26

## 2022-08-11 RX ORDER — ZINC OXIDE 200 MG/G
1 OINTMENT TOPICAL
Refills: 0 | Status: DISCONTINUED | OUTPATIENT
Start: 2022-08-11 | End: 2022-08-26

## 2022-08-11 RX ORDER — AMLODIPINE BESYLATE 2.5 MG/1
5 TABLET ORAL DAILY
Refills: 0 | Status: DISCONTINUED | OUTPATIENT
Start: 2022-08-12 | End: 2022-08-11

## 2022-08-11 RX ORDER — LOSARTAN POTASSIUM 100 MG/1
100 TABLET, FILM COATED ORAL DAILY
Refills: 0 | Status: DISCONTINUED | OUTPATIENT
Start: 2022-08-12 | End: 2022-08-11

## 2022-08-11 RX ADMIN — Medication 2 MILLIGRAM(S): at 05:27

## 2022-08-11 RX ADMIN — ATORVASTATIN CALCIUM 40 MILLIGRAM(S): 80 TABLET, FILM COATED ORAL at 21:21

## 2022-08-11 RX ADMIN — Medication 7 UNIT(S): at 08:24

## 2022-08-11 RX ADMIN — Medication 4: at 08:24

## 2022-08-11 RX ADMIN — Medication 25 MILLIGRAM(S): at 05:27

## 2022-08-11 RX ADMIN — ENOXAPARIN SODIUM 40 MILLIGRAM(S): 100 INJECTION SUBCUTANEOUS at 19:01

## 2022-08-11 RX ADMIN — Medication 2 MILLIGRAM(S): at 13:12

## 2022-08-11 RX ADMIN — SENNA PLUS 2 TABLET(S): 8.6 TABLET ORAL at 21:21

## 2022-08-11 RX ADMIN — POLYETHYLENE GLYCOL 3350 17 GRAM(S): 17 POWDER, FOR SOLUTION ORAL at 19:01

## 2022-08-11 RX ADMIN — PANTOPRAZOLE SODIUM 40 MILLIGRAM(S): 20 TABLET, DELAYED RELEASE ORAL at 06:24

## 2022-08-11 RX ADMIN — ZINC OXIDE 1 APPLICATION(S): 200 OINTMENT TOPICAL at 19:01

## 2022-08-11 RX ADMIN — Medication 7 UNIT(S): at 17:24

## 2022-08-11 RX ADMIN — GABAPENTIN 200 MILLIGRAM(S): 400 CAPSULE ORAL at 21:21

## 2022-08-11 RX ADMIN — INSULIN GLARGINE 15 UNIT(S): 100 INJECTION, SOLUTION SUBCUTANEOUS at 21:30

## 2022-08-11 RX ADMIN — Medication 5 UNIT(S): at 12:35

## 2022-08-11 RX ADMIN — DIVALPROEX SODIUM 500 MILLIGRAM(S): 500 TABLET, DELAYED RELEASE ORAL at 05:27

## 2022-08-11 RX ADMIN — DIVALPROEX SODIUM 500 MILLIGRAM(S): 500 TABLET, DELAYED RELEASE ORAL at 21:21

## 2022-08-11 RX ADMIN — BRIVARACETAM 50 MILLIGRAM(S): 25 TABLET, FILM COATED ORAL at 19:01

## 2022-08-11 RX ADMIN — AMLODIPINE BESYLATE 5 MILLIGRAM(S): 2.5 TABLET ORAL at 05:27

## 2022-08-11 RX ADMIN — Medication 4: at 17:24

## 2022-08-11 RX ADMIN — Medication 2 MILLIGRAM(S): at 21:21

## 2022-08-11 RX ADMIN — POLYETHYLENE GLYCOL 3350 17 GRAM(S): 17 POWDER, FOR SOLUTION ORAL at 05:27

## 2022-08-11 RX ADMIN — Medication 6 MILLIGRAM(S): at 21:22

## 2022-08-11 RX ADMIN — DIVALPROEX SODIUM 500 MILLIGRAM(S): 500 TABLET, DELAYED RELEASE ORAL at 13:12

## 2022-08-11 RX ADMIN — LOSARTAN POTASSIUM 100 MILLIGRAM(S): 100 TABLET, FILM COATED ORAL at 05:27

## 2022-08-11 RX ADMIN — Medication 2: at 12:36

## 2022-08-12 LAB
ANION GAP SERPL CALC-SCNC: 12 MMOL/L — SIGNIFICANT CHANGE UP (ref 5–17)
BUN SERPL-MCNC: 65 MG/DL — HIGH (ref 7–23)
CALCIUM SERPL-MCNC: 8.6 MG/DL — SIGNIFICANT CHANGE UP (ref 8.4–10.5)
CHLORIDE SERPL-SCNC: 101 MMOL/L — SIGNIFICANT CHANGE UP (ref 96–108)
CO2 SERPL-SCNC: 23 MMOL/L — SIGNIFICANT CHANGE UP (ref 22–31)
CREAT SERPL-MCNC: 1.6 MG/DL — HIGH (ref 0.5–1.3)
EGFR: 32 ML/MIN/1.73M2 — LOW
GLUCOSE BLDC GLUCOMTR-MCNC: 221 MG/DL — HIGH (ref 70–99)
GLUCOSE BLDC GLUCOMTR-MCNC: 243 MG/DL — HIGH (ref 70–99)
GLUCOSE BLDC GLUCOMTR-MCNC: 330 MG/DL — HIGH (ref 70–99)
GLUCOSE BLDC GLUCOMTR-MCNC: 377 MG/DL — HIGH (ref 70–99)
GLUCOSE SERPL-MCNC: 265 MG/DL — HIGH (ref 70–99)
HCT VFR BLD CALC: 34.1 % — LOW (ref 34.5–45)
HGB BLD-MCNC: 11.2 G/DL — LOW (ref 11.5–15.5)
MCHC RBC-ENTMCNC: 31.5 PG — SIGNIFICANT CHANGE UP (ref 27–34)
MCHC RBC-ENTMCNC: 32.8 GM/DL — SIGNIFICANT CHANGE UP (ref 32–36)
MCV RBC AUTO: 96.1 FL — SIGNIFICANT CHANGE UP (ref 80–100)
NRBC # BLD: 0 /100 WBCS — SIGNIFICANT CHANGE UP (ref 0–0)
PLATELET # BLD AUTO: 369 K/UL — SIGNIFICANT CHANGE UP (ref 150–400)
POTASSIUM SERPL-MCNC: 4.4 MMOL/L — SIGNIFICANT CHANGE UP (ref 3.5–5.3)
POTASSIUM SERPL-SCNC: 4.4 MMOL/L — SIGNIFICANT CHANGE UP (ref 3.5–5.3)
RBC # BLD: 3.55 M/UL — LOW (ref 3.8–5.2)
RBC # FLD: 13.1 % — SIGNIFICANT CHANGE UP (ref 10.3–14.5)
SODIUM SERPL-SCNC: 136 MMOL/L — SIGNIFICANT CHANGE UP (ref 135–145)
WBC # BLD: 12.4 K/UL — HIGH (ref 3.8–10.5)
WBC # FLD AUTO: 12.4 K/UL — HIGH (ref 3.8–10.5)

## 2022-08-12 PROCEDURE — 99233 SBSQ HOSP IP/OBS HIGH 50: CPT

## 2022-08-12 RX ORDER — SODIUM CHLORIDE 9 MG/ML
1000 INJECTION INTRAMUSCULAR; INTRAVENOUS; SUBCUTANEOUS
Refills: 0 | Status: DISCONTINUED | OUTPATIENT
Start: 2022-08-12 | End: 2022-08-13

## 2022-08-12 RX ORDER — SODIUM CHLORIDE 9 MG/ML
500 INJECTION INTRAMUSCULAR; INTRAVENOUS; SUBCUTANEOUS ONCE
Refills: 0 | Status: COMPLETED | OUTPATIENT
Start: 2022-08-12 | End: 2022-08-12

## 2022-08-12 RX ORDER — INSULIN GLARGINE 100 [IU]/ML
18 INJECTION, SOLUTION SUBCUTANEOUS AT BEDTIME
Refills: 0 | Status: DISCONTINUED | OUTPATIENT
Start: 2022-08-12 | End: 2022-08-17

## 2022-08-12 RX ADMIN — POLYETHYLENE GLYCOL 3350 17 GRAM(S): 17 POWDER, FOR SOLUTION ORAL at 12:31

## 2022-08-12 RX ADMIN — Medication 2 MILLIGRAM(S): at 13:01

## 2022-08-12 RX ADMIN — GABAPENTIN 200 MILLIGRAM(S): 400 CAPSULE ORAL at 22:28

## 2022-08-12 RX ADMIN — Medication 6 MILLIGRAM(S): at 22:27

## 2022-08-12 RX ADMIN — Medication 7 UNIT(S): at 08:38

## 2022-08-12 RX ADMIN — DIVALPROEX SODIUM 500 MILLIGRAM(S): 500 TABLET, DELAYED RELEASE ORAL at 13:01

## 2022-08-12 RX ADMIN — Medication 25 MILLIGRAM(S): at 18:24

## 2022-08-12 RX ADMIN — Medication 4: at 08:38

## 2022-08-12 RX ADMIN — INSULIN GLARGINE 18 UNIT(S): 100 INJECTION, SOLUTION SUBCUTANEOUS at 22:28

## 2022-08-12 RX ADMIN — Medication 5 UNIT(S): at 12:31

## 2022-08-12 RX ADMIN — SODIUM CHLORIDE 60 MILLILITER(S): 9 INJECTION INTRAMUSCULAR; INTRAVENOUS; SUBCUTANEOUS at 18:25

## 2022-08-12 RX ADMIN — Medication 2 MILLIGRAM(S): at 22:28

## 2022-08-12 RX ADMIN — BRIVARACETAM 50 MILLIGRAM(S): 25 TABLET, FILM COATED ORAL at 05:49

## 2022-08-12 RX ADMIN — Medication 7 UNIT(S): at 17:11

## 2022-08-12 RX ADMIN — Medication 10: at 12:30

## 2022-08-12 RX ADMIN — ATORVASTATIN CALCIUM 40 MILLIGRAM(S): 80 TABLET, FILM COATED ORAL at 22:27

## 2022-08-12 RX ADMIN — SODIUM CHLORIDE 500 MILLILITER(S): 9 INJECTION INTRAMUSCULAR; INTRAVENOUS; SUBCUTANEOUS at 15:17

## 2022-08-12 RX ADMIN — BRIVARACETAM 50 MILLIGRAM(S): 25 TABLET, FILM COATED ORAL at 18:23

## 2022-08-12 RX ADMIN — Medication 8: at 17:10

## 2022-08-12 RX ADMIN — DIVALPROEX SODIUM 500 MILLIGRAM(S): 500 TABLET, DELAYED RELEASE ORAL at 22:27

## 2022-08-12 RX ADMIN — ENOXAPARIN SODIUM 40 MILLIGRAM(S): 100 INJECTION SUBCUTANEOUS at 18:23

## 2022-08-12 RX ADMIN — ZINC OXIDE 1 APPLICATION(S): 200 OINTMENT TOPICAL at 18:25

## 2022-08-12 RX ADMIN — Medication 2 MILLIGRAM(S): at 05:48

## 2022-08-12 RX ADMIN — PANTOPRAZOLE SODIUM 40 MILLIGRAM(S): 20 TABLET, DELAYED RELEASE ORAL at 05:49

## 2022-08-12 RX ADMIN — DIVALPROEX SODIUM 500 MILLIGRAM(S): 500 TABLET, DELAYED RELEASE ORAL at 05:48

## 2022-08-12 RX ADMIN — ZINC OXIDE 1 APPLICATION(S): 200 OINTMENT TOPICAL at 12:32

## 2022-08-12 RX ADMIN — POLYETHYLENE GLYCOL 3350 17 GRAM(S): 17 POWDER, FOR SOLUTION ORAL at 18:24

## 2022-08-12 NOTE — DIETITIAN NUTRITION RISK NOTIFICATION - TREATMENT: THE FOLLOWING DIET HAS BEEN RECOMMENDED
Diet, Regular:   Consistent Carbohydrate {Evening Snack} (CSTCHOSN) (08-01-22 @ 15:08) [Active]

## 2022-08-12 NOTE — DIETITIAN INITIAL EVALUATION ADULT - PERTINENT MEDS FT
MEDICATIONS  (STANDING):  atorvastatin 40 milliGRAM(s) Oral at bedtime  brivaracetam 50 milliGRAM(s) Oral every 12 hours  dexAMETHasone     Tablet   Oral   dexAMETHasone     Tablet 2 milliGRAM(s) Oral three times a day  dextrose 5%. 1000 milliLiter(s) (100 mL/Hr) IV Continuous <Continuous>  dextrose 5%. 1000 milliLiter(s) (50 mL/Hr) IV Continuous <Continuous>  dextrose 50% Injectable 25 Gram(s) IV Push once  dextrose 50% Injectable 12.5 Gram(s) IV Push once  dextrose 50% Injectable 25 Gram(s) IV Push once  diVALproex  milliGRAM(s) Oral every 8 hours  enoxaparin Injectable 40 milliGRAM(s) SubCutaneous <User Schedule>  gabapentin 200 milliGRAM(s) Oral at bedtime  glucagon  Injectable 1 milliGRAM(s) IntraMuscular once  insulin glargine Injectable (LANTUS) 18 Unit(s) SubCutaneous at bedtime  insulin lispro (ADMELOG) corrective regimen sliding scale   SubCutaneous three times a day before meals  insulin lispro (ADMELOG) corrective regimen sliding scale   SubCutaneous at bedtime  insulin lispro Injectable (ADMELOG) 7 Unit(s) SubCutaneous before breakfast  insulin lispro Injectable (ADMELOG) 5 Unit(s) SubCutaneous before lunch  insulin lispro Injectable (ADMELOG) 7 Unit(s) SubCutaneous before dinner  melatonin 6 milliGRAM(s) Oral at bedtime  metoprolol tartrate 25 milliGRAM(s) Oral two times a day  pantoprazole    Tablet 40 milliGRAM(s) Oral before breakfast  polyethylene glycol 3350 17 Gram(s) Oral two times a day  senna 2 Tablet(s) Oral at bedtime  sodium chloride 0.9% Bolus 500 milliLiter(s) IV Bolus once  sodium chloride 0.9%. 1000 milliLiter(s) (60 mL/Hr) IV Continuous <Continuous>  zinc oxide 40% Paste 1 Application(s) Topical two times a day    MEDICATIONS  (PRN):  acetaminophen     Tablet .. 650 milliGRAM(s) Oral every 6 hours PRN Temp greater or equal to 38C (100.4F), Mild Pain (1 - 3)  bisacodyl Oral Tab/Cap - Peds 5 milliGRAM(s) Oral daily PRN Constipation  dextrose Oral Gel 15 Gram(s) Oral once PRN Blood Glucose LESS THAN 70 milliGRAM(s)/deciliter

## 2022-08-12 NOTE — DIETITIAN INITIAL EVALUATION ADULT - PERTINENT LABORATORY DATA
08-12    136  |  101  |  65<H>  ----------------------------<  265<H>  4.4   |  23  |  1.60<H>    Ca    8.6      12 Aug 2022 06:07    TPro  6.2  /  Alb  2.3<L>  /  TBili  0.2  /  DBili  x   /  AST  15  /  ALT  28  /  AlkPhos  73  08-11  POCT Blood Glucose.: 221 mg/dL (08-12-22 @ 08:17)  A1C with Estimated Average Glucose Result: 8.7 % (07-20-22 @ 06:50)

## 2022-08-12 NOTE — DIETITIAN INITIAL EVALUATION ADULT - ORAL INTAKE PTA/DIET HISTORY
Pt reports fair PO intake normally at home, prior to admission. Pt normally does not eat breakfast, just drinks coffee, eats yogurt/fruit after 11 AM. Pt does not cook, gets dinner already prepared/takeout. Pt reports trying to monitor carb intake but noted with HbA1c of 8.7%. Pt reports taking MVI, vitamin D3 PTA. Pt reports trying to do weight watchers program PTA. Reports UBW of 164 lbs. Noted w/ suboptimal PO intake during previous hospitalization.

## 2022-08-12 NOTE — PROGRESS NOTE ADULT - SUBJECTIVE AND OBJECTIVE BOX
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 No events overnight however patient noted to have low BPs but asymptomatic. Denies headache, changes in vision, chest pain or palpitations.    ALLERGIES:  adhesives (Pruritus)  No Known Drug Allergies    MEDICATIONS  (STANDING):  atorvastatin 40 milliGRAM(s) Oral at bedtime  brivaracetam 50 milliGRAM(s) Oral every 12 hours  dexAMETHasone     Tablet   Oral   dexAMETHasone     Tablet 2 milliGRAM(s) Oral three times a day  dextrose 5%. 1000 milliLiter(s) (100 mL/Hr) IV Continuous <Continuous>  dextrose 5%. 1000 milliLiter(s) (50 mL/Hr) IV Continuous <Continuous>  dextrose 50% Injectable 25 Gram(s) IV Push once  dextrose 50% Injectable 12.5 Gram(s) IV Push once  dextrose 50% Injectable 25 Gram(s) IV Push once  diVALproex  milliGRAM(s) Oral every 8 hours  enoxaparin Injectable 40 milliGRAM(s) SubCutaneous <User Schedule>  gabapentin 200 milliGRAM(s) Oral at bedtime  glucagon  Injectable 1 milliGRAM(s) IntraMuscular once  insulin glargine Injectable (LANTUS) 18 Unit(s) SubCutaneous at bedtime  insulin lispro (ADMELOG) corrective regimen sliding scale   SubCutaneous three times a day before meals  insulin lispro (ADMELOG) corrective regimen sliding scale   SubCutaneous at bedtime  insulin lispro Injectable (ADMELOG) 7 Unit(s) SubCutaneous before breakfast  insulin lispro Injectable (ADMELOG) 5 Unit(s) SubCutaneous before lunch  insulin lispro Injectable (ADMELOG) 7 Unit(s) SubCutaneous before dinner  melatonin 6 milliGRAM(s) Oral at bedtime  metoprolol tartrate 25 milliGRAM(s) Oral two times a day  pantoprazole    Tablet 40 milliGRAM(s) Oral before breakfast  polyethylene glycol 3350 17 Gram(s) Oral two times a day  senna 2 Tablet(s) Oral at bedtime  sodium chloride 0.9% Bolus 500 milliLiter(s) IV Bolus once  sodium chloride 0.9%. 1000 milliLiter(s) (60 mL/Hr) IV Continuous <Continuous>  zinc oxide 40% Paste 1 Application(s) Topical two times a day    MEDICATIONS  (PRN):  acetaminophen     Tablet .. 650 milliGRAM(s) Oral every 6 hours PRN Temp greater or equal to 38C (100.4F), Mild Pain (1 - 3)  bisacodyl Oral Tab/Cap - Peds 5 milliGRAM(s) Oral daily PRN Constipation  dextrose Oral Gel 15 Gram(s) Oral once PRN Blood Glucose LESS THAN 70 milliGRAM(s)/deciliter    Vital Signs Last 24 Hrs  T(F): 98 (12 Aug 2022 09:38), Max: 98 (12 Aug 2022 09:38)  HR: 61 (12 Aug 2022 09:38) (61 - 82)  BP: 97/57 (12 Aug 2022 09:38) (86/49 - 97/72)  RR: 16 (12 Aug 2022 09:38) (15 - 16)  SpO2: 95% (12 Aug 2022 09:38) (95% - 99%)  I&O's Summary      PHYSICAL EXAM:  GENERAL: NAD, sitting in chair  HEAD:  Normocephalic, incision site on R parietal region c/d/i, forehead pressure injury blister  EYES: PERRL, conjunctiva and sclera clear  ENMT: Moist mucous membranes, Good dentition  CHEST/LUNG: Clear to auscultation bilaterally, non-labored breathing, good air entry  HEART: RRR; S1/S2, No murmur  ABDOMEN: Soft, Nontender, Nondistended; Bowel sounds present  VASCULAR: Normal pulses, Normal capillary refill  EXTREMITIES: No cyanosis, No edema  SKIN: Warm, perfused    LABS:                        11.2   12.40 )-----------( 369      ( 12 Aug 2022 06:07 )             34.1     08-12    136  |  101  |  65  ----------------------------<  265  4.4   |  23  |  1.60    Ca    8.6      12 Aug 2022 06:07    TPro  6.2  /  Alb  2.3  /  TBili  0.2  /  DBili  x   /  AST  15  /  ALT  28  /  AlkPhos  73  08-11              08-01 Chol 136 mg/dL LDL -- HDL 59 mg/dL Trig 73 mg/dL              POCT Blood Glucose.: 221 mg/dL (12 Aug 2022 08:17)  POCT Blood Glucose.: 206 mg/dL (11 Aug 2022 21:30)  POCT Blood Glucose.: 208 mg/dL (11 Aug 2022 17:00)  POCT Blood Glucose.: 189 mg/dL (11 Aug 2022 12:14)          COVID-19 PCR: NotDetec (08-10-22 @ 17:00)  COVID-19 PCR: NotDetec (08-10-22 @ 07:27)  COVID-19 PCR: NotDetec (08-06-22 @ 06:46)  COVID-19 PCR: NotDetec (07-31-22 @ 07:45)  COVID-19 PCR: NotDetec (07-24-22 @ 06:24)      RADIOLOGY & ADDITIONAL TESTS: reviewed labs    Care Discussed with Consultants/Other Providers: discussed with Dr. 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 No events overnight however patient noted to have low BPs but asymptomatic. Denies headache, changes in vision, chest pain or palpitations.    ALLERGIES:  adhesives (Pruritus)  No Known Drug Allergies    MEDICATIONS  (STANDING):  atorvastatin 40 milliGRAM(s) Oral at bedtime  brivaracetam 50 milliGRAM(s) Oral every 12 hours  dexAMETHasone     Tablet   Oral   dexAMETHasone     Tablet 2 milliGRAM(s) Oral three times a day  dextrose 5%. 1000 milliLiter(s) (100 mL/Hr) IV Continuous <Continuous>  dextrose 5%. 1000 milliLiter(s) (50 mL/Hr) IV Continuous <Continuous>  dextrose 50% Injectable 25 Gram(s) IV Push once  dextrose 50% Injectable 12.5 Gram(s) IV Push once  dextrose 50% Injectable 25 Gram(s) IV Push once  diVALproex  milliGRAM(s) Oral every 8 hours  enoxaparin Injectable 40 milliGRAM(s) SubCutaneous <User Schedule>  gabapentin 200 milliGRAM(s) Oral at bedtime  glucagon  Injectable 1 milliGRAM(s) IntraMuscular once  insulin glargine Injectable (LANTUS) 18 Unit(s) SubCutaneous at bedtime  insulin lispro (ADMELOG) corrective regimen sliding scale   SubCutaneous three times a day before meals  insulin lispro (ADMELOG) corrective regimen sliding scale   SubCutaneous at bedtime  insulin lispro Injectable (ADMELOG) 7 Unit(s) SubCutaneous before breakfast  insulin lispro Injectable (ADMELOG) 5 Unit(s) SubCutaneous before lunch  insulin lispro Injectable (ADMELOG) 7 Unit(s) SubCutaneous before dinner  melatonin 6 milliGRAM(s) Oral at bedtime  metoprolol tartrate 25 milliGRAM(s) Oral two times a day  pantoprazole    Tablet 40 milliGRAM(s) Oral before breakfast  polyethylene glycol 3350 17 Gram(s) Oral two times a day  senna 2 Tablet(s) Oral at bedtime  sodium chloride 0.9% Bolus 500 milliLiter(s) IV Bolus once  sodium chloride 0.9%. 1000 milliLiter(s) (60 mL/Hr) IV Continuous <Continuous>  zinc oxide 40% Paste 1 Application(s) Topical two times a day    MEDICATIONS  (PRN):  acetaminophen     Tablet .. 650 milliGRAM(s) Oral every 6 hours PRN Temp greater or equal to 38C (100.4F), Mild Pain (1 - 3)  bisacodyl Oral Tab/Cap - Peds 5 milliGRAM(s) Oral daily PRN Constipation  dextrose Oral Gel 15 Gram(s) Oral once PRN Blood Glucose LESS THAN 70 milliGRAM(s)/deciliter    Vital Signs Last 24 Hrs  T(F): 98 (12 Aug 2022 09:38), Max: 98 (12 Aug 2022 09:38)  HR: 61 (12 Aug 2022 09:38) (61 - 82)  BP: 97/57 (12 Aug 2022 09:38) (86/49 - 97/72)  RR: 16 (12 Aug 2022 09:38) (15 - 16)  SpO2: 95% (12 Aug 2022 09:38) (95% - 99%)  I&O's Summary      PHYSICAL EXAM:  GENERAL: NAD, sitting in chair  HEAD:  Normocephalic, incision site on R parietal region c/d/i, forehead blister  EYES: PERRL, conjunctiva and sclera clear  ENMT: Moist mucous membranes, Good dentition  CHEST/LUNG: Clear to auscultation bilaterally, non-labored breathing, good air entry  HEART: RRR; S1/S2, No murmur  ABDOMEN: Soft, Nontender, Nondistended; Bowel sounds present  VASCULAR: Normal pulses, Normal capillary refill  EXTREMITIES: No cyanosis, No edema  SKIN: Warm, perfused    LABS:                        11.2   12.40 )-----------( 369      ( 12 Aug 2022 06:07 )             34.1     08-12    136  |  101  |  65  ----------------------------<  265  4.4   |  23  |  1.60    Ca    8.6      12 Aug 2022 06:07    TPro  6.2  /  Alb  2.3  /  TBili  0.2  /  DBili  x   /  AST  15  /  ALT  28  /  AlkPhos  73  08-11              08-01 Chol 136 mg/dL LDL -- HDL 59 mg/dL Trig 73 mg/dL              POCT Blood Glucose.: 221 mg/dL (12 Aug 2022 08:17)  POCT Blood Glucose.: 206 mg/dL (11 Aug 2022 21:30)  POCT Blood Glucose.: 208 mg/dL (11 Aug 2022 17:00)  POCT Blood Glucose.: 189 mg/dL (11 Aug 2022 12:14)          COVID-19 PCR: NotDetec (08-10-22 @ 17:00)  COVID-19 PCR: NotDetec (08-10-22 @ 07:27)  COVID-19 PCR: NotDetec (08-06-22 @ 06:46)  COVID-19 PCR: NotDetec (07-31-22 @ 07:45)  COVID-19 PCR: NotDetec (07-24-22 @ 06:24)      RADIOLOGY & ADDITIONAL TESTS: reviewed labs    Care Discussed with Consultants/Other Providers: discussed with Dr. Saucedo

## 2022-08-12 NOTE — PROGRESS NOTE ADULT - ASSESSMENT
82 year old F PMH HTN, DM, HLD, CAD, MI, PAD, peripheral neuropathy, former smoker presented to [facility] ED on 7/19/22 with brain mass s/p Right craniotomy/resection 7/27/22. 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, primary lung  #ADL impairement  - MR brain 7/20/22 with Right temporal lobe peripherally enhancing, centrally necrotic 3.1 x 2.6 cm mass with surrounding vasogenic edema with METS  - s/p right craniotomy for resection of brain mass 7/27/22  - Bronchial biopsy consistent with poorly differentiated adenocarcinoma 7/20/22  - continue with Brivaracetam 50mg BID and decadron taper  - continue depakote 500mg q8hrs for seizure ppx  - continue comprehensive rehab program  - will need outpatient PET/CT scan, radiation therapy and further evaluation with heme/onc    #PERCY  - possibly due to dehydration vs hypoperfusion due to low BPs  - discontinue losartan and amlodipine  - encourage PO fluid intake  - will order for NS bolus today and IVF maintenance IVF  - follow up BMP tomorrow     #orthostatic  - orthostatic in PT, possibly from deconditioning and multiple BP meds  - discontinued losartan and amlodipine   - encourage PO intake  - continue to monitor     #leukocytosis  - likely in setting of recent steroid use  - afebrile, monitor closely and off abx for now  - recently treated with zosyn x5 days for UTI    #CAD  #STEMI  #new onset afib  #HTN  #HLD  - echo 8/8/22 with EF 60-65%, basal inferolateral wall, the basal inferior wall, the basal anterolateral wall, and the mid inferior wall are hypokinetic  - not candidate for PCI given risks > benefit as per cath team due to recent craniotomy   - continue asa 81mg and lopressor 25mg BID, atorvastatin 40mg daily   - continue eliquis 5mg BID  - follow up EKG  - will discontinue amlodipine and losartan for now, monitor BP    #DM type 2  - HbA1C 8.7 on 7/20  - continue lantus 15U qhs and admleog 5U TID     #history of PE  - found to have RML segmental PE on 8/3  - continue Eliquis 5mg BID    #anemia  - H/H stable  - anemia of chronic disease due to malignancy as per heme    #DVT ppx  - on Eliquis 82 year old F PMH HTN, DM, HLD, CAD, MI, PAD, peripheral neuropathy, former smoker presented to [facility] ED on 7/19/22 with brain mass s/p Right craniotomy/resection 7/27/22. 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, primary lung  #ADL impairement  - MR brain 7/20/22 with Right temporal lobe peripherally enhancing, centrally necrotic 3.1 x 2.6 cm mass with surrounding vasogenic edema with METS  - s/p right craniotomy for resection of brain mass 7/27/22  - Bronchial biopsy consistent with poorly differentiated adenocarcinoma 7/20/22  - continue with Brivaracetam 50mg BID and decadron taper  - continue depakote 500mg q8hrs for seizure ppx  - continue comprehensive rehab program  - will need outpatient PET/CT scan, radiation therapy and further evaluation with heme/onc    #PERCY  - possibly due to dehydration vs hypoperfusion due to low BPs  - discontinue losartan and amlodipine  - encourage PO fluid intake  - will order for NS bolus today and IVF maintenance IVF  - follow up BMP tomorrow     #orthostatic  - orthostatic in PT, possibly from deconditioning and multiple BP meds  - discontinued losartan and amlodipine   - encourage PO intake  - continue to monitor     #leukocytosis  - likely in setting of recent steroid use  - afebrile, monitor closely and off abx for now  - recently treated with zosyn x5 days for UTI    #CAD  #STEMI  #new onset afib  #HTN  #HLD  - echo 8/8/22 with EF 60-65%, basal inferolateral wall, the basal inferior wall, the basal anterolateral wall, and the mid inferior wall are hypokinetic  - not candidate for PCI given risks > benefit as per cath team due to recent craniotomy   - continue asa 81mg and lopressor 25mg BID, atorvastatin 40mg daily   - continue eliquis 5mg BID  - follow up EKG  - will discontinue amlodipine and losartan for now, monitor BP    #DM type 2  - HbA1C 8.7 on 7/20  - increase lantus to 18U 8/12  - continue admleog 7/5/7    #history of PE  - found to have RML segmental PE on 8/3  - continue Eliquis 5mg BID    #anemia  - H/H stable  - anemia of chronic disease due to malignancy as per heme    #DVT ppx  - on Eliquis

## 2022-08-12 NOTE — PROGRESS NOTE ADULT - SUBJECTIVE AND OBJECTIVE BOX
Patient is a 82y old  Female who presents with a chief complaint of Neoplasm of uncertain behavior of brain     (12 Aug 2022 11:51)      HPI:  Patient is a 81 YO female RH dominant with PMHx HTN, DM, HLD, CAD, MI, PAD, peripheral neuropathy, former smoker, who presented to [facility] ED on 7/19/22 with chronic back pain and gait/balance issues x 4 months. She was found to have a brain mass on imaging while undergoing work up by her orthopedist (Dr. Castro).    MR brain 7/20/22 +Right temporal lobe peripherally enhancing, centrally necrotic 3.1 x 2.6 cm mass with surrounding vasogenic edema. 8 mm leftward midline shift. Right uncal herniation. Additional left parafalcine 1.3 x 0.9 cm rim-enhancing cystic lesion with mild surrounding vasogenic edema. Tiny 0.3 x 0.3cm rim-enhancing lesion within the left frontal cortex concerning for metastatic disease. CT chest showed an obstructing left upper lobe endobronchial lesion. 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 Patient underwent Right craniotomy for resection of brain mass 7/27/22, Pathology +NSC carcinoma (favor adeno). Plans for out-pt PET/CT scan, RT, and consideration for systemic therapy.     Post-op course significant for STEMI 8/1/22. Not a candidate for cath secondary to comorbidities and recent surgery. She was started on ASA/ heparin gtt and a betablocker. TTE 7/22 +normal LV internal dimensions with discrete upper septal hypertrophy. EF 55%, inferolateral wall is akinetic. She was found to have RML segmental PE 8./3. Hospital course also significant for hyperkalemia, and encephalopathy, likely secondary to UTI/early urosepsis (treated with 5 days of Zosyn). EEG +Right temporal sharp waves, no seizures; started on Briviact 8/3/22. She developed new onset Afib w RVR on 8/4/22. with elevated troponin; metoprolol was increased, and heparin anticoagulation switched to Eliquis on 8/9/22.    Patient was evaluated by PM&R and therapy for functional deficits, gait/ADL impairments and acute rehabilitation was recommended. Patient was medically optimized for discharge to [facility] IRF 8/10/22. (10 Aug 2022 13:45)      PAST MEDICAL & SURGICAL HISTORY:  Hypertension      Coronary artery disease      Hyperlipidemia      Peripheral artery disease      Myocardial infarct      Peripheral neuropathy      Diabetes      Chronic back pain      Former smoker      PAD (peripheral artery disease)      CAD (coronary artery disease)  stents          MEDICATIONS  (STANDING):  atorvastatin 40 milliGRAM(s) Oral at bedtime  brivaracetam 50 milliGRAM(s) Oral every 12 hours  dexAMETHasone     Tablet 2 milliGRAM(s) Oral three times a day  dexAMETHasone     Tablet   Oral   dextrose 5%. 1000 milliLiter(s) (50 mL/Hr) IV Continuous <Continuous>  dextrose 5%. 1000 milliLiter(s) (100 mL/Hr) IV Continuous <Continuous>  dextrose 50% Injectable 25 Gram(s) IV Push once  dextrose 50% Injectable 12.5 Gram(s) IV Push once  dextrose 50% Injectable 25 Gram(s) IV Push once  diVALproex  milliGRAM(s) Oral every 8 hours  enoxaparin Injectable 40 milliGRAM(s) SubCutaneous <User Schedule>  gabapentin 200 milliGRAM(s) Oral at bedtime  glucagon  Injectable 1 milliGRAM(s) IntraMuscular once  insulin glargine Injectable (LANTUS) 18 Unit(s) SubCutaneous at bedtime  insulin lispro (ADMELOG) corrective regimen sliding scale   SubCutaneous three times a day before meals  insulin lispro (ADMELOG) corrective regimen sliding scale   SubCutaneous at bedtime  insulin lispro Injectable (ADMELOG) 7 Unit(s) SubCutaneous before breakfast  insulin lispro Injectable (ADMELOG) 5 Unit(s) SubCutaneous before lunch  insulin lispro Injectable (ADMELOG) 7 Unit(s) SubCutaneous before dinner  melatonin 6 milliGRAM(s) Oral at bedtime  metoprolol tartrate 25 milliGRAM(s) Oral two times a day  pantoprazole    Tablet 40 milliGRAM(s) Oral before breakfast  polyethylene glycol 3350 17 Gram(s) Oral two times a day  senna 2 Tablet(s) Oral at bedtime  sodium chloride 0.9% Bolus 500 milliLiter(s) IV Bolus once  sodium chloride 0.9%. 1000 milliLiter(s) (60 mL/Hr) IV Continuous <Continuous>  zinc oxide 40% Paste 1 Application(s) Topical two times a day    MEDICATIONS  (PRN):  acetaminophen     Tablet .. 650 milliGRAM(s) Oral every 6 hours PRN Temp greater or equal to 38C (100.4F), Mild Pain (1 - 3)  bisacodyl Oral Tab/Cap - Peds 5 milliGRAM(s) Oral daily PRN Constipation  dextrose Oral Gel 15 Gram(s) Oral once PRN Blood Glucose LESS THAN 70 milliGRAM(s)/deciliter      Allergies    adhesives (Pruritus)  No Known Drug Allergies    Intolerances          VITALS  82y  Vital Signs Last 24 Hrs  T(C): 36.7 (12 Aug 2022 09:38), Max: 36.7 (12 Aug 2022 09:38)  T(F): 98 (12 Aug 2022 09:38), Max: 98 (12 Aug 2022 09:38)  HR: 61 (12 Aug 2022 09:38) (61 - 82)  BP: 97/57 (12 Aug 2022 09:38) (86/49 - 97/72)  BP(mean): --  RR: 16 (12 Aug 2022 09:38) (15 - 16)  SpO2: 95% (12 Aug 2022 09:38) (95% - 99%)    Parameters below as of 12 Aug 2022 09:38  Patient On (Oxygen Delivery Method): room air      Daily     Daily         RECENT LABS:                          11.2   12.40 )-----------( 369      ( 12 Aug 2022 06:07 )             34.1     08-12    136  |  101  |  65<H>  ----------------------------<  265<H>  4.4   |  23  |  1.60<H>    Ca    8.6      12 Aug 2022 06:07    TPro  6.2  /  Alb  2.3<L>  /  TBili  0.2  /  DBili  x   /  AST  15  /  ALT  28  /  AlkPhos  73  08-11    LIVER FUNCTIONS - ( 11 Aug 2022 06:30 )  Alb: 2.3 g/dL / Pro: 6.2 g/dL / ALK PHOS: 73 U/L / ALT: 28 U/L / AST: 15 U/L / GGT: x                   CAPILLARY BLOOD GLUCOSE      POCT Blood Glucose.: 377 mg/dL (12 Aug 2022 12:25)  POCT Blood Glucose.: 221 mg/dL (12 Aug 2022 08:17)  POCT Blood Glucose.: 206 mg/dL (11 Aug 2022 21:30)  POCT Blood Glucose.: 208 mg/dL (11 Aug 2022 17:00)

## 2022-08-12 NOTE — DIETITIAN INITIAL EVALUATION ADULT - SIGNS/SYMPTOMS
as evidenced by HbA1c of 8.7%, elevated POCT glucose as evidenced by 24#/14% unintentional wt loss in 1 month, moderate loss of muscle and fat

## 2022-08-12 NOTE — PROGRESS NOTE ADULT - ASSESSMENT
Patient is a 83 YO female with PMHx HTN, DM, HLD, CAD, MI, PAD, peripheral neuropathy, former smoker, who presented to [facility] ED on 7/19/22 with brain mass s/p Right craniotomy/resection 7/27/22, Pathology +NSC carcinoma. Post-op course significant for STEMI, hyperkalemia, encephalopathy 2/2 UTI, new onset a-fib. Patient now with gait Instability, ADL impairments and Functional impairments.    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, primary lung aphasia, cognitive impairment  - MR brain 7/20/22 with Right temporal lobe peripherally enhancing, centrally necrotic 3.1 x 2.6 cm mass with surrounding vasogenic edema with METS  - s/p Right craniotomy for resection of brain mass 7/27/22  - Bronchial biopsy consistent with poorly differentiated adenocarcinoma 7/20/22  - Brivaracetam 50mg BID  - continueComprehensive Rehab Program: PT/OT/ST, 3hours daily and 5 days weekly  - recreation therapy  - Out-pt PET/CT scan, RT, and consideration for systemic therapy  - Precautions: cardiac, DM, fall, sensory, SZ, AMS, aspiration, AC    # HTN/CAD/A-fib  - c/w ASA  - Eliquis 5mg BID  - Metoprolol 25mg BID  - discontinue amlodipine and losartan for now, monitor BP  - IVF 8/12  -  (86/49 - 97/72)    # HLD  - Lipitor 40mg daily    # DMII  - a1c 8.7 on 7/20  - ISS and FS  - increase lantus to 18U 8/12  - continue admleog 7/5/7 qAC    # leukocytosis  - WBC 12.4 8/12 (increased from 11.78)  - possibly reactive, afebrile, no symptoms.  - monitor CBC in AM 8/13    # RI  - BUn/Cr increased 65/1.60 8/12, hypotension  - encourage po fluids, discussed with patient  - IVF NS bolus  - BMP 8/13    # Thyroid Nodule  - OP f/u with Endocrinology    # PE  - Eliquis 5mg BID  - O2 sat 95-99% RA 8/12    # Pain management  - Tylenol PRN  - gabapentin    # GI ppx  - Protonix 40mg  - Senna, miralax PRN    # FEN   - Diet: Consistent Carbohydrate Diabetic     # Skin:  - Skin on admission: stage 1 pressure ulcer on LLE lateral heel and right buttock, cranial staples c/d/i. Forehead open blister area with granulation tissue  - Pressure injury/Skin: Turn Q2hrs while in bed, OOB to Chair, PT/OT     # DVT ppx  - eliquis  - SCD, TEDs    # LABS  CBC BMP 8/13        Outpatient Follow-up (Specialty/Name of physician):    Willard Loomis; YUKO; MS)  Neurosurgery  [address], [address]  [place] [address]  Phone: [phone]  Fax: [phone]  Follow Up Time:     Yaron Alexander (MD)  Hematology; Internal Medicine; Medical Oncology  [address] [facility], [address]  [place] [address]  Phone: [phone]  Fax: [phone]  Follow Up Time:     Rafael Funes  RADIATION ONCOLOGY  [address] [facility] [address] [place] [address]  Phone: [phone]  Fax: [phone]    Satish Hopkins)  Internal Medicine; Pulmonary Disease  [address] [facility], [address]  [place] [address]  Phone: [phone]  Fax: [phone]    Satish Alcaraz)  Cardiovascular Disease; Interventional Cardiology; Nuclear Cardiology  [address] [facility], [address]  [place] [address]  Phone: [phone]  Fax: [phone]     Patient is a 83 YO female with PMHx HTN, DM, HLD, CAD, MI, PAD, peripheral neuropathy, former smoker, who presented to [facility] ED on 7/19/22 with brain mass s/p Right craniotomy/resection 7/27/22, Pathology +NSC carcinoma. Post-op course significant for STEMI, hyperkalemia, encephalopathy 2/2 UTI, new onset a-fib. Patient now with gait Instability, ADL impairments and Functional impairments.    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, primary lung aphasia, cognitive impairment  - MR brain 7/20/22 with Right temporal lobe peripherally enhancing, centrally necrotic 3.1 x 2.6 cm mass with surrounding vasogenic edema with METS  - s/p Right craniotomy for resection of brain mass 7/27/22  - Bronchial biopsy consistent with poorly differentiated adenocarcinoma 7/20/22  - Brivaracetam 50mg BID  - continueComprehensive Rehab Program: PT/OT/ST, 3hours daily and 5 days weekly  - recreation therapy  - Out-pt PET/CT scan, RT, and consideration for systemic therapy  - Precautions: cardiac, DM, fall, sensory, SZ, AMS, aspiration, AC    # HTN/CAD/A-fib  - c/w ASA  - Eliquis 5mg BID  - Metoprolol 25mg BID  - discontinue amlodipine and losartan for now, monitor BP  - IVF 8/12  -  (86/49 - 97/72)    # HLD  - Lipitor 40mg daily    # DMII  - a1c 8.7 on 7/20  - ISS and FS  - increase lantus to 18U 8/12  - continue admleog 7/5/7 qAC    # leukocytosis  - WBC 12.4 8/12 (increased from 11.78)  - possibly reactive, afebrile, no symptoms.  - monitor CBC in AM 8/13    # RI  - BUn/Cr increased 65/1.60 8/12, hypotension  - encourage po fluids, discussed with patient  - IVF NS bolus  - BMP 8/13    # Thyroid Nodule  - OP f/u with Endocrinology    # PE  - Eliquis 5mg BID  - O2 sat 95-99% RA 8/12    # Pain management  - Tylenol PRN  - gabapentin    # GI ppx  - Protonix 40mg  - Senna, miralax PRN    #   - voiding well, dc bladder scan    # FEN   - Diet: Consistent Carbohydrate Diabetic     # Skin:  - Skin on admission: stage 1 pressure ulcer on LLE lateral heel and right buttock, cranial staples c/d/i. Forehead open blister area with granulation tissue  - Pressure injury/Skin: Turn Q2hrs while in bed, OOB to Chair, PT/OT     # DVT ppx  - eliquis  - SCD, TEDs    # LABS  CBC BMP 8/13        Outpatient Follow-up (Specialty/Name of physician):    Willard Loomis (MD; YUKO; MS)  Neurosurgery  [address] [facility], [address]  [place] [address]  Phone: [phone]  Fax: [phone]  Follow Up Time:     Yaron Alexander (MD)  Hematology; Internal Medicine; Medical Oncology  [address] [facility], [address]  [place] [address]  Phone: [phone]  Fax: [phone]  Follow Up Time:     Rafael Funes  RADIATION ONCOLOGY  [address] [facility] [address] [place] [address]  Phone: [phone]  Fax: [phone]    Satish Hopkins)  Internal Medicine; Pulmonary Disease  [address] [facility], [address]  [place] [address]  Phone: [phone]  Fax: [phone]    Satish Alcaraz)  Cardiovascular Disease; Interventional Cardiology; Nuclear Cardiology  [address] [facility]  [place] [address]  Phone: [phone]  Fax: [phone]

## 2022-08-12 NOTE — DIETITIAN INITIAL EVALUATION ADULT - OTHER INFO
Patient is a 81 YO female with PMHx HTN, DM, HLD, CAD, MI, PAD, peripheral neuropathy, former smoker, who presented to [facility] ED on 7/19/22 with brain mass s/p Right craniotomy/resection 7/27/22, Pathology +NSC carcinoma. Post-op course significant for STEMI, hyperkalemia, encephalopathy 2/2 UTI, new onset a-fib. Patient now with gait Instability, ADL impairments and Functional impairments.    Pt tolerating regular consistency, consistent carbohydrate diet with evening snack, reports fair appetite/PO intake but has been liking the hospital food here thus far. Pt educated on consistent carbohydrate diet + carb counting at each meal to promote improvement in glycemic control. Noted to be on steroids which are likely affecting glycemic control. Pt reports UBW of 164 lbs. Current weight is 140.6 lbs. Noted w/ physical signs of malnutrition (loss of muscle and subcutaneous fat). Encouraged protein intake @ meals. Pt receptive to adding Glucerna supplement (provides 220 kcal, 10 g protein/serving) to current diet. Denies nausea, vomiting, diarrhea, constipation. Pt denies chewing/swallowing difficulties.

## 2022-08-13 LAB
ANION GAP SERPL CALC-SCNC: 10 MMOL/L — SIGNIFICANT CHANGE UP (ref 5–17)
BUN SERPL-MCNC: 45 MG/DL — HIGH (ref 7–23)
CALCIUM SERPL-MCNC: 8.4 MG/DL — SIGNIFICANT CHANGE UP (ref 8.4–10.5)
CHLORIDE SERPL-SCNC: 105 MMOL/L — SIGNIFICANT CHANGE UP (ref 96–108)
CO2 SERPL-SCNC: 25 MMOL/L — SIGNIFICANT CHANGE UP (ref 22–31)
CREAT SERPL-MCNC: 0.95 MG/DL — SIGNIFICANT CHANGE UP (ref 0.5–1.3)
EGFR: 60 ML/MIN/1.73M2 — SIGNIFICANT CHANGE UP
GLUCOSE BLDC GLUCOMTR-MCNC: 133 MG/DL — HIGH (ref 70–99)
GLUCOSE BLDC GLUCOMTR-MCNC: 141 MG/DL — HIGH (ref 70–99)
GLUCOSE BLDC GLUCOMTR-MCNC: 175 MG/DL — HIGH (ref 70–99)
GLUCOSE BLDC GLUCOMTR-MCNC: 215 MG/DL — HIGH (ref 70–99)
GLUCOSE SERPL-MCNC: 138 MG/DL — HIGH (ref 70–99)
HCT VFR BLD CALC: 32 % — LOW (ref 34.5–45)
HGB BLD-MCNC: 10.6 G/DL — LOW (ref 11.5–15.5)
MCHC RBC-ENTMCNC: 31.2 PG — SIGNIFICANT CHANGE UP (ref 27–34)
MCHC RBC-ENTMCNC: 33.1 GM/DL — SIGNIFICANT CHANGE UP (ref 32–36)
MCV RBC AUTO: 94.1 FL — SIGNIFICANT CHANGE UP (ref 80–100)
NRBC # BLD: 0 /100 WBCS — SIGNIFICANT CHANGE UP (ref 0–0)
PLATELET # BLD AUTO: 363 K/UL — SIGNIFICANT CHANGE UP (ref 150–400)
POTASSIUM SERPL-MCNC: 4.4 MMOL/L — SIGNIFICANT CHANGE UP (ref 3.5–5.3)
POTASSIUM SERPL-SCNC: 4.4 MMOL/L — SIGNIFICANT CHANGE UP (ref 3.5–5.3)
RBC # BLD: 3.4 M/UL — LOW (ref 3.8–5.2)
RBC # FLD: 13.3 % — SIGNIFICANT CHANGE UP (ref 10.3–14.5)
SODIUM SERPL-SCNC: 140 MMOL/L — SIGNIFICANT CHANGE UP (ref 135–145)
WBC # BLD: 14.09 K/UL — HIGH (ref 3.8–10.5)
WBC # FLD AUTO: 14.09 K/UL — HIGH (ref 3.8–10.5)

## 2022-08-13 PROCEDURE — 99233 SBSQ HOSP IP/OBS HIGH 50: CPT

## 2022-08-13 PROCEDURE — 99232 SBSQ HOSP IP/OBS MODERATE 35: CPT

## 2022-08-13 RX ORDER — INSULIN LISPRO 100/ML
7 VIAL (ML) SUBCUTANEOUS
Refills: 0 | Status: DISCONTINUED | OUTPATIENT
Start: 2022-08-13 | End: 2022-08-25

## 2022-08-13 RX ADMIN — DIVALPROEX SODIUM 500 MILLIGRAM(S): 500 TABLET, DELAYED RELEASE ORAL at 13:18

## 2022-08-13 RX ADMIN — Medication 7 UNIT(S): at 08:05

## 2022-08-13 RX ADMIN — Medication 2 MILLIGRAM(S): at 17:09

## 2022-08-13 RX ADMIN — GABAPENTIN 200 MILLIGRAM(S): 400 CAPSULE ORAL at 22:14

## 2022-08-13 RX ADMIN — Medication 4: at 12:28

## 2022-08-13 RX ADMIN — DIVALPROEX SODIUM 500 MILLIGRAM(S): 500 TABLET, DELAYED RELEASE ORAL at 22:14

## 2022-08-13 RX ADMIN — ZINC OXIDE 1 APPLICATION(S): 200 OINTMENT TOPICAL at 17:18

## 2022-08-13 RX ADMIN — Medication 2: at 17:10

## 2022-08-13 RX ADMIN — ENOXAPARIN SODIUM 40 MILLIGRAM(S): 100 INJECTION SUBCUTANEOUS at 17:09

## 2022-08-13 RX ADMIN — Medication 25 MILLIGRAM(S): at 17:10

## 2022-08-13 RX ADMIN — Medication 2 MILLIGRAM(S): at 06:18

## 2022-08-13 RX ADMIN — Medication 7 UNIT(S): at 12:29

## 2022-08-13 RX ADMIN — PANTOPRAZOLE SODIUM 40 MILLIGRAM(S): 20 TABLET, DELAYED RELEASE ORAL at 06:18

## 2022-08-13 RX ADMIN — Medication 7 UNIT(S): at 17:11

## 2022-08-13 RX ADMIN — BRIVARACETAM 50 MILLIGRAM(S): 25 TABLET, FILM COATED ORAL at 06:18

## 2022-08-13 RX ADMIN — Medication 6 MILLIGRAM(S): at 22:15

## 2022-08-13 RX ADMIN — BRIVARACETAM 50 MILLIGRAM(S): 25 TABLET, FILM COATED ORAL at 17:09

## 2022-08-13 RX ADMIN — DIVALPROEX SODIUM 500 MILLIGRAM(S): 500 TABLET, DELAYED RELEASE ORAL at 06:18

## 2022-08-13 RX ADMIN — INSULIN GLARGINE 18 UNIT(S): 100 INJECTION, SOLUTION SUBCUTANEOUS at 22:15

## 2022-08-13 RX ADMIN — SENNA PLUS 2 TABLET(S): 8.6 TABLET ORAL at 22:14

## 2022-08-13 RX ADMIN — ATORVASTATIN CALCIUM 40 MILLIGRAM(S): 80 TABLET, FILM COATED ORAL at 22:14

## 2022-08-13 NOTE — PROGRESS NOTE ADULT - SUBJECTIVE AND OBJECTIVE BOX
Patient is a 82y old  Female who presents with a chief complaint of meetastatic adenoCA to brain s/p resection 7/27/22 (12 Aug 2022 13:35)    Patient seen and examined at bedside. No events overnight. Denies headache, changes in vision, chest pain or palpitations.    ALLERGIES:  adhesives (Pruritus)  No Known Drug Allergies    MEDICATIONS  (STANDING):  atorvastatin 40 milliGRAM(s) Oral at bedtime  brivaracetam 50 milliGRAM(s) Oral every 12 hours  dexAMETHasone     Tablet   Oral   dexAMETHasone     Tablet 2 milliGRAM(s) Oral two times a day  dextrose 5%. 1000 milliLiter(s) (100 mL/Hr) IV Continuous <Continuous>  dextrose 5%. 1000 milliLiter(s) (50 mL/Hr) IV Continuous <Continuous>  dextrose 50% Injectable 25 Gram(s) IV Push once  dextrose 50% Injectable 12.5 Gram(s) IV Push once  dextrose 50% Injectable 25 Gram(s) IV Push once  diVALproex  milliGRAM(s) Oral every 8 hours  enoxaparin Injectable 40 milliGRAM(s) SubCutaneous <User Schedule>  gabapentin 200 milliGRAM(s) Oral at bedtime  glucagon  Injectable 1 milliGRAM(s) IntraMuscular once  insulin glargine Injectable (LANTUS) 18 Unit(s) SubCutaneous at bedtime  insulin lispro (ADMELOG) corrective regimen sliding scale   SubCutaneous three times a day before meals  insulin lispro (ADMELOG) corrective regimen sliding scale   SubCutaneous at bedtime  insulin lispro Injectable (ADMELOG) 7 Unit(s) SubCutaneous three times a day before meals  melatonin 6 milliGRAM(s) Oral at bedtime  metoprolol tartrate 25 milliGRAM(s) Oral two times a day  pantoprazole    Tablet 40 milliGRAM(s) Oral before breakfast  polyethylene glycol 3350 17 Gram(s) Oral two times a day  senna 2 Tablet(s) Oral at bedtime  zinc oxide 40% Paste 1 Application(s) Topical two times a day    MEDICATIONS  (PRN):  acetaminophen     Tablet .. 650 milliGRAM(s) Oral every 6 hours PRN Temp greater or equal to 38C (100.4F), Mild Pain (1 - 3)  bisacodyl Oral Tab/Cap - Peds 5 milliGRAM(s) Oral daily PRN Constipation  dextrose Oral Gel 15 Gram(s) Oral once PRN Blood Glucose LESS THAN 70 milliGRAM(s)/deciliter    Vital Signs Last 24 Hrs  T(F): 97.8 (13 Aug 2022 08:53), Max: 98 (12 Aug 2022 22:25)  HR: 57 (13 Aug 2022 08:53) (57 - 75)  BP: 125/57 (13 Aug 2022 08:53) (115/70 - 125/57)  RR: 16 (13 Aug 2022 08:53) (15 - 16)  SpO2: 96% (13 Aug 2022 08:53) (96% - 98%)  I&O's Summary    12 Aug 2022 07:01  -  13 Aug 2022 07:00  --------------------------------------------------------  IN: 0 mL / OUT: 1000 mL / NET: -1000 mL      PHYSICAL EXAM:  GENERAL: NAD, laying in bed  HEAD:  Normocephalic, incision site on R parietal region c/d/i, forehead blister  EYES: PERRL, conjunctiva and sclera clear  ENMT: Moist mucous membranes, Good dentition  CHEST/LUNG: Clear to auscultation bilaterally, non-labored breathing, good air entry  HEART: RRR; S1/S2, No murmur  ABDOMEN: Soft, Nontender, Nondistended; Bowel sounds present  VASCULAR: Normal pulses, Normal capillary refill  EXTREMITIES: No cyanosis, No edema  SKIN: Warm, perfused    LABS:                        10.6   14.09 )-----------( 363      ( 13 Aug 2022 06:00 )             32.0     08-13    140  |  105  |  45  ----------------------------<  138  4.4   |  25  |  0.95    Ca    8.4      13 Aug 2022 06:00    TPro  6.2  /  Alb  2.3  /  TBili  0.2  /  DBili  x   /  AST  15  /  ALT  28  /  AlkPhos  73  08-11              08-01 Chol 136 mg/dL LDL -- HDL 59 mg/dL Trig 73 mg/dL              POCT Blood Glucose.: 133 mg/dL (13 Aug 2022 08:10)  POCT Blood Glucose.: 243 mg/dL (12 Aug 2022 22:26)  POCT Blood Glucose.: 330 mg/dL (12 Aug 2022 17:03)  POCT Blood Glucose.: 377 mg/dL (12 Aug 2022 12:25)          COVID-19 PCR: NotDetec (08-10-22 @ 17:00)  COVID-19 PCR: NotDetec (08-10-22 @ 07:27)  COVID-19 PCR: NotDetec (08-06-22 @ 06:46)  COVID-19 PCR: NotDetec (07-31-22 @ 07:45)  COVID-19 PCR: NotDetec (07-24-22 @ 06:24)      RADIOLOGY & ADDITIONAL TESTS: reviewed labs    Care Discussed with Consultants/Other Providers:    Yes

## 2022-08-13 NOTE — PROGRESS NOTE ADULT - ASSESSMENT
82 year old F PMH HTN, DM, HLD, CAD, MI, PAD, peripheral neuropathy, former smoker presented to [facility] ED on 7/19/22 with brain mass s/p Right craniotomy/resection 7/27/22. 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, primary lung  #ADL impairement  - MR brain 7/20/22 with Right temporal lobe peripherally enhancing, centrally necrotic 3.1 x 2.6 cm mass with surrounding vasogenic edema with METS  - s/p right craniotomy for resection of brain mass 7/27/22  - Bronchial biopsy consistent with poorly differentiated adenocarcinoma 7/20/22  - continue with Brivaracetam 50mg BID and decadron taper  - continue depakote 500mg q8hrs for seizure ppx  - continue comprehensive rehab program  - will need outpatient PET/CT scan, radiation therapy and further evaluation with heme/onc    #PERCY  - possibly due to dehydration vs hypoperfusion due to low BPs  - discontinue losartan and amlodipine  - s/p IVF with resolution of PERCY  - encourage PO fluid intake    #orthostatic  - orthostatic in PT, possibly from deconditioning and multiple BP meds  - discontinued losartan and amlodipine   - encourage PO intake  - continue to monitor     #leukocytosis  - likely in setting of recent steroid use  - afebrile, monitor closely and off abx for now  - recently treated with zosyn x5 days for UTI    #CAD  #STEMI  #new onset afib  #HTN  #HLD  - echo 8/8/22 with EF 60-65%, basal inferolateral wall, the basal inferior wall, the basal anterolateral wall, and the mid inferior wall are hypokinetic  - not candidate for PCI given risks > benefit as per cath team due to recent craniotomy   - continue asa 81mg and lopressor 25mg BID, atorvastatin 40mg daily   - continue eliquis 5mg BID  - follow up EKG  - will discontinue amlodipine and losartan for now, monitor BP    #DM type 2  - HbA1C 8.7 on 7/20  - continue lantus 18U and admelog increased to 7U TID qAC    #history of PE  - found to have RML segmental PE on 8/3  - continue Eliquis 5mg BID    #anemia  - H/H stable  - anemia of chronic disease due to malignancy as per heme    #DVT ppx  - on Eliquis

## 2022-08-13 NOTE — PROGRESS NOTE ADULT - SUBJECTIVE AND OBJECTIVE BOX
"Subjective:   Esteban Al is a 15 month old male who presents for   Chief Complaint   Patient presents with     Ear Problem     Week after treatment for stridor/croup --was much iproved    Starting last night waking up every 15 minutes fussy . Not napping well today. Runny nose developed. No recorded fevers.   Appetite is so-so. No increased work of breathing.   Is having about 4-6 wet diapers a day.   No vomiting or diarrhea. No new rash of the body.     Patient is accompanied by both parents  PMHX/PSHX/MEDS/ALLERGIES/SHX/FHX reviewed in Epic.    Patient Active Problem List    Diagnosis Date Noted     Infantile eczema 2017     Priority: Medium     Liveborn by  2017     Priority: Medium       Current Outpatient Prescriptions   Medication     dexamethasone (DECADRON) 4 MG tablet     dexamethasone PF (DECADRON) 10 MG/ML injection     EPINEPHrine (EPIPEN JR) 0.15 MG/0.3ML injection 2-pack     POLY-Vi-SOL (POLY-VI-SOL) solution     No current facility-administered medications for this visit.      ROS:  As above per HPI    Objective:   Pulse 134  Temp 98.2  F (36.8  C) (Axillary)  Ht 2' 7\" (0.787 m)  Wt 21 lb 15 oz (9.951 kg)  SpO2 97%  BMI 16.05 kg/m2, Body mass index is 16.05 kg/(m^2).  Gen:  well-nourished, sitting comfortably, NAD, active, consolable  HEENT: EOMI, sclera anicteric, head normocephalic, ; nares patent; moist mucous membranes, left TM just mildly erythematous but no bulging, mild cerumen in both ears, right tm normal  Neck: trachea midline, no thyromegaly  CV:  Hemodynamically stable, RRR  Pulm:  no increased work of breathing , CTAB, no wheezes/rales/rhonchi   Extrem: no cyanosis, edema or clubbing  Skin: no obvious rashes or abnormalities of exposed skin  Gait: normal  MSK: no muscle wasting    Assessment & Plan:   Esteban Al, 15 month old male who presents with:  Fussy infant  If he looks well-hydrated and eating well while he was in the room has good energy and is " consolable.  Reassuring vital signs.  No increased work of breathing and he has no evidence of wheezing.  Does have a slightly stuffy nose and occasional cough.  Reassuring is had no fevers over the last day or so.  Had a difficult night with sleeping but on exam does not appear that he has a recurrence of an ear infection.  Recommended ibuprofen or Tylenol if he is fussy.  If he is coughing while laying down can try a dose of children's Benadryl to try to reduce postnasal drip.  Fussiness could be due to dentition that is coming in or sensitivity to changes in the barometric pressure as a rain storm is approaching. F/u as needed      Jacek Bowers MD   [facility]     Options for treatment and/or follow-up care were reviewed with the patient. Esteban Al and/or legal guardian was engaged and actively involved in the decision making process. Patient/guardian verbalized understanding of the options discussed and was satisfied with the final plan.     Patient is a 82y old  Female who presents with a chief complaint of metastatic adenoCA to brain s/p resection 7/27/22 (13 Aug 2022 10:00)      HPI:  Patient is a 81 YO female RH dominant with PMHx HTN, DM, HLD, CAD, MI, PAD, peripheral neuropathy, former smoker, who presented to [facility] ED on 7/19/22 with chronic back pain and gait/balance issues x 4 months. She was found to have a brain mass on imaging while undergoing work up by her orthopedist (Dr. Castro).    MR brain 7/20/22 +Right temporal lobe peripherally enhancing, centrally necrotic 3.1 x 2.6 cm mass with surrounding vasogenic edema. 8 mm leftward midline shift. Right uncal herniation. Additional left parafalcine 1.3 x 0.9 cm rim-enhancing cystic lesion with mild surrounding vasogenic edema. Tiny 0.3 x 0.3cm rim-enhancing lesion within the left frontal cortex concerning for metastatic disease. CT chest showed an obstructing left upper lobe endobronchial lesion. 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 Patient underwent Right craniotomy for resection of brain mass 7/27/22, Pathology +NSC carcinoma (favor adeno). Plans for out-pt PET/CT scan, RT, and consideration for systemic therapy.     Post-op course significant for STEMI 8/1/22. Not a candidate for cath secondary to comorbidities and recent surgery. She was started on ASA/ heparin gtt and a betablocker. TTE 7/22 +normal LV internal dimensions with discrete upper septal hypertrophy. EF 55%, inferolateral wall is akinetic. She was found to have RML segmental PE 8./3. Hospital course also significant for hyperkalemia, and encephalopathy, likely secondary to UTI/early urosepsis (treated with 5 days of Zosyn). EEG +Right temporal sharp waves, no seizures; started on Briviact 8/3/22. She developed new onset Afib w RVR on 8/4/22. with elevated troponin; metoprolol was increased, and heparin anticoagulation switched to Eliquis on 8/9/22.    Patient was evaluated by PM&R and therapy for functional deficits, gait/ADL impairments and acute rehabilitation was recommended. Patient was medically optimized for discharge to [facility] IRF 8/10/22. (10 Aug 2022 13:45)      PAST MEDICAL & SURGICAL HISTORY:  Hypertension      Coronary artery disease      Hyperlipidemia      Peripheral artery disease      Myocardial infarct      Peripheral neuropathy      Diabetes      Chronic back pain      Former smoker      PAD (peripheral artery disease)      CAD (coronary artery disease)  stents          MEDICATIONS  (STANDING):  atorvastatin 40 milliGRAM(s) Oral at bedtime  brivaracetam 50 milliGRAM(s) Oral every 12 hours  dexAMETHasone     Tablet   Oral   dexAMETHasone     Tablet 2 milliGRAM(s) Oral two times a day  dextrose 5%. 1000 milliLiter(s) (100 mL/Hr) IV Continuous <Continuous>  dextrose 5%. 1000 milliLiter(s) (50 mL/Hr) IV Continuous <Continuous>  dextrose 50% Injectable 25 Gram(s) IV Push once  dextrose 50% Injectable 12.5 Gram(s) IV Push once  dextrose 50% Injectable 25 Gram(s) IV Push once  diVALproex  milliGRAM(s) Oral every 8 hours  enoxaparin Injectable 40 milliGRAM(s) SubCutaneous <User Schedule>  gabapentin 200 milliGRAM(s) Oral at bedtime  glucagon  Injectable 1 milliGRAM(s) IntraMuscular once  insulin glargine Injectable (LANTUS) 18 Unit(s) SubCutaneous at bedtime  insulin lispro (ADMELOG) corrective regimen sliding scale   SubCutaneous three times a day before meals  insulin lispro (ADMELOG) corrective regimen sliding scale   SubCutaneous at bedtime  insulin lispro Injectable (ADMELOG) 7 Unit(s) SubCutaneous three times a day before meals  melatonin 6 milliGRAM(s) Oral at bedtime  metoprolol tartrate 25 milliGRAM(s) Oral two times a day  pantoprazole    Tablet 40 milliGRAM(s) Oral before breakfast  polyethylene glycol 3350 17 Gram(s) Oral two times a day  senna 2 Tablet(s) Oral at bedtime  zinc oxide 40% Paste 1 Application(s) Topical two times a day    MEDICATIONS  (PRN):  acetaminophen     Tablet .. 650 milliGRAM(s) Oral every 6 hours PRN Temp greater or equal to 38C (100.4F), Mild Pain (1 - 3)  bisacodyl Oral Tab/Cap - Peds 5 milliGRAM(s) Oral daily PRN Constipation  dextrose Oral Gel 15 Gram(s) Oral once PRN Blood Glucose LESS THAN 70 milliGRAM(s)/deciliter      Allergies    adhesives (Pruritus)  No Known Drug Allergies    Intolerances          VITALS  82y  Vital Signs Last 24 Hrs  T(C): 36.6 (13 Aug 2022 08:53), Max: 36.7 (12 Aug 2022 22:25)  T(F): 97.8 (13 Aug 2022 08:53), Max: 98 (12 Aug 2022 22:25)  HR: 57 (13 Aug 2022 08:53) (57 - 75)  BP: 125/57 (13 Aug 2022 08:53) (115/70 - 125/57)  BP(mean): --  RR: 16 (13 Aug 2022 08:53) (15 - 16)  SpO2: 96% (13 Aug 2022 08:53) (96% - 98%)    Parameters below as of 13 Aug 2022 08:53  Patient On (Oxygen Delivery Method): room air      Daily     Daily         RECENT LABS:                          10.6   14.09 )-----------( 363      ( 13 Aug 2022 06:00 )             32.0     08-13    140  |  105  |  45<H>  ----------------------------<  138<H>  4.4   |  25  |  0.95    Ca    8.4      13 Aug 2022 06:00                CAPILLARY BLOOD GLUCOSE      POCT Blood Glucose.: 215 mg/dL (13 Aug 2022 12:22)  POCT Blood Glucose.: 133 mg/dL (13 Aug 2022 08:10)  POCT Blood Glucose.: 243 mg/dL (12 Aug 2022 22:26)  POCT Blood Glucose.: 330 mg/dL (12 Aug 2022 17:03)        Review of Systems:   · Additional ROS	Patient denies any H/A, dizziness, lightheadedness, palpitations or SOB. Improtance of adequate hydration discussed, patient agreeable although she doesn't like using bathroom frequently    slept well and in good spirits,. feels steadier on standing acivities no new complaints. Had BM x 2 yesterday    Physical Exam:   · Constitutional Comments	alert O x 2-3 craniotomy incision healing well Mood stable. Reduced recall  · Respiratory	clear to auscultation bilaterally; no wheezes; no rales; no rhonchi; good- effort  · Cardiovascular	regular rate and rhythm; S1 S2 present; no gallops; no rub; no murmur  · Gastrointestinal	soft; nontender; nondistended; normal active bowel sounds  · Musculoskeletal	no calf tenderness  · Musculoskeletal Comments	bilateral HF 4/5 quad 4+/5 ankle PF and DF 5/5 normal tone

## 2022-08-13 NOTE — PROGRESS NOTE ADULT - ASSESSMENT
Patient is a 81 YO female with PMHx HTN, DM, HLD, CAD, MI, PAD, peripheral neuropathy, former smoker, who presented to [facility] ED on 7/19/22 with brain mass s/p Right craniotomy/resection 7/27/22, Pathology +NSC carcinoma. Post-op course significant for STEMI, hyperkalemia, encephalopathy 2/2 UTI, new onset a-fib.    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, primary lung aphasia, cognitive impairment  - MR brain 7/20/22 with Right temporal lobe peripherally enhancing, centrally necrotic 3.1 x 2.6 cm mass with surrounding vasogenic edema with METS  - s/p Right craniotomy for resection of brain mass 7/27/22  - Bronchial biopsy consistent with poorly differentiated adenocarcinoma 7/20/22  - Brivaracetam 50mg BID  - continue Comprehensive Rehab Program: PT/OT/ST, 3hours daily and 5 days weekly  - recreation therapy  - Out-pt PET/CT scan, RT, and consideration for systemic therapy  - Precautions: cardiac, DM, fall, sensory, SZ, AMS, aspiration, AC    # HTN/CAD/A-fib  - c/w ASA  - Eliquis 5mg BID  - Metoprolol 25mg BID  - discontinue amlodipine and losartan due to soft BP 8/12  - s/p IVF 8/12  - 115/70 - 125/57) 8/13 improved    # HLD  - Lipitor 40mg daily    # DMII  - a1c 8.7 on 7/20  - ISS and FS  - increase lantus to 18U 8/12  - continue admleog 7/5/7 qAC    # leukocytosis  - WBC 12.4 8/12 (increased from 11.78)--> 14.09 8/13  - discussed with hospitalist, recently Rx UTI, asymptomatic, afebrile ? steroid use. Monitor  - CBC 8/14    # RI  - encourage po fluids, discussed with patient  - s/p IVF NS bolus  - BUn/Cr  65/1.60 8/12--> 45/0.95 8/13 improved,  - BMP 8/14    # Thyroid Nodule  - OP f/u with Endocrinology    # PE  - Eliquis 5mg BID    # Pain management  - Tylenol PRN  - gabapentin    # GI ppx  - Protonix 40mg  - Senna, miralax PRN    # FEN   - Diet: Consistent Carbohydrate Diabetic     # Skin:  - Skin on admission: stage 1 pressure ulcer on LLE lateral heel and right buttock, cranial staples c/d/i. Forehead open blister area with granulation tissue  - Pressure injury/Skin: Turn Q2hrs while in bed, OOB to Chair, PT/OT     # DVT ppx  - eliquis  - SCD, TEDs    # LABS  CBC BMP 8/14        Outpatient Follow-up (Specialty/Name of physician):    Willard Loomis (MD; YUKO; MS)  Neurosurgery  [address], [address]  [place] [address]  Phone: [phone]  Fax: [phone]  Follow Up Time:     Yaron Alexander (MD)  Hematology; Internal Medicine; Medical Oncology  [address] [facility], [address]  [place] [address]  Phone: [phone]  Fax: [phone]  Follow Up Time:     Rafael Funes  RADIATION ONCOLOGY  [address] [facility] [address] - [place] [address]  Phone: [phone]  Fax: [phone]    Satish Hopkins)  Internal Medicine; Pulmonary Disease  [address] [facility] [address], [address]  [place] [address]  Phone: [phone]  Fax: [phone]    Satish Alcaraz)  Cardiovascular Disease; Interventional Cardiology; Nuclear Cardiology  [address] [facility], [address]  [place] [address]  Phone: [phone]  Fax: [phone]

## 2022-08-14 LAB
ANION GAP SERPL CALC-SCNC: 11 MMOL/L — SIGNIFICANT CHANGE UP (ref 5–17)
BUN SERPL-MCNC: 55 MG/DL — HIGH (ref 7–23)
CALCIUM SERPL-MCNC: 8.6 MG/DL — SIGNIFICANT CHANGE UP (ref 8.4–10.5)
CHLORIDE SERPL-SCNC: 108 MMOL/L — SIGNIFICANT CHANGE UP (ref 96–108)
CO2 SERPL-SCNC: 24 MMOL/L — SIGNIFICANT CHANGE UP (ref 22–31)
CREAT SERPL-MCNC: 1.23 MG/DL — SIGNIFICANT CHANGE UP (ref 0.5–1.3)
EGFR: 44 ML/MIN/1.73M2 — LOW
GLUCOSE BLDC GLUCOMTR-MCNC: 107 MG/DL — HIGH (ref 70–99)
GLUCOSE BLDC GLUCOMTR-MCNC: 120 MG/DL — HIGH (ref 70–99)
GLUCOSE BLDC GLUCOMTR-MCNC: 163 MG/DL — HIGH (ref 70–99)
GLUCOSE BLDC GLUCOMTR-MCNC: 92 MG/DL — SIGNIFICANT CHANGE UP (ref 70–99)
GLUCOSE SERPL-MCNC: 94 MG/DL — SIGNIFICANT CHANGE UP (ref 70–99)
HCT VFR BLD CALC: 29.9 % — LOW (ref 34.5–45)
HGB BLD-MCNC: 10 G/DL — LOW (ref 11.5–15.5)
MCHC RBC-ENTMCNC: 31.5 PG — SIGNIFICANT CHANGE UP (ref 27–34)
MCHC RBC-ENTMCNC: 33.4 GM/DL — SIGNIFICANT CHANGE UP (ref 32–36)
MCV RBC AUTO: 94.3 FL — SIGNIFICANT CHANGE UP (ref 80–100)
NRBC # BLD: 0 /100 WBCS — SIGNIFICANT CHANGE UP (ref 0–0)
PLATELET # BLD AUTO: 315 K/UL — SIGNIFICANT CHANGE UP (ref 150–400)
POTASSIUM SERPL-MCNC: 4 MMOL/L — SIGNIFICANT CHANGE UP (ref 3.5–5.3)
POTASSIUM SERPL-SCNC: 4 MMOL/L — SIGNIFICANT CHANGE UP (ref 3.5–5.3)
RBC # BLD: 3.17 M/UL — LOW (ref 3.8–5.2)
RBC # FLD: 13.5 % — SIGNIFICANT CHANGE UP (ref 10.3–14.5)
SODIUM SERPL-SCNC: 143 MMOL/L — SIGNIFICANT CHANGE UP (ref 135–145)
WBC # BLD: 8.38 K/UL — SIGNIFICANT CHANGE UP (ref 3.8–10.5)
WBC # FLD AUTO: 8.38 K/UL — SIGNIFICANT CHANGE UP (ref 3.8–10.5)

## 2022-08-14 PROCEDURE — 99232 SBSQ HOSP IP/OBS MODERATE 35: CPT

## 2022-08-14 RX ADMIN — SENNA PLUS 2 TABLET(S): 8.6 TABLET ORAL at 22:45

## 2022-08-14 RX ADMIN — ENOXAPARIN SODIUM 40 MILLIGRAM(S): 100 INJECTION SUBCUTANEOUS at 17:52

## 2022-08-14 RX ADMIN — ZINC OXIDE 1 APPLICATION(S): 200 OINTMENT TOPICAL at 18:01

## 2022-08-14 RX ADMIN — Medication 6 MILLIGRAM(S): at 22:44

## 2022-08-14 RX ADMIN — DIVALPROEX SODIUM 500 MILLIGRAM(S): 500 TABLET, DELAYED RELEASE ORAL at 06:44

## 2022-08-14 RX ADMIN — Medication 7 UNIT(S): at 08:17

## 2022-08-14 RX ADMIN — DIVALPROEX SODIUM 500 MILLIGRAM(S): 500 TABLET, DELAYED RELEASE ORAL at 22:45

## 2022-08-14 RX ADMIN — BRIVARACETAM 50 MILLIGRAM(S): 25 TABLET, FILM COATED ORAL at 06:45

## 2022-08-14 RX ADMIN — Medication 2 MILLIGRAM(S): at 17:51

## 2022-08-14 RX ADMIN — INSULIN GLARGINE 18 UNIT(S): 100 INJECTION, SOLUTION SUBCUTANEOUS at 22:44

## 2022-08-14 RX ADMIN — Medication 25 MILLIGRAM(S): at 17:56

## 2022-08-14 RX ADMIN — Medication 7 UNIT(S): at 17:53

## 2022-08-14 RX ADMIN — Medication 2 MILLIGRAM(S): at 06:44

## 2022-08-14 RX ADMIN — Medication 25 MILLIGRAM(S): at 06:45

## 2022-08-14 RX ADMIN — GABAPENTIN 200 MILLIGRAM(S): 400 CAPSULE ORAL at 22:44

## 2022-08-14 RX ADMIN — ZINC OXIDE 1 APPLICATION(S): 200 OINTMENT TOPICAL at 06:44

## 2022-08-14 RX ADMIN — DIVALPROEX SODIUM 500 MILLIGRAM(S): 500 TABLET, DELAYED RELEASE ORAL at 13:07

## 2022-08-14 RX ADMIN — ATORVASTATIN CALCIUM 40 MILLIGRAM(S): 80 TABLET, FILM COATED ORAL at 22:45

## 2022-08-14 RX ADMIN — PANTOPRAZOLE SODIUM 40 MILLIGRAM(S): 20 TABLET, DELAYED RELEASE ORAL at 06:44

## 2022-08-14 RX ADMIN — Medication 7 UNIT(S): at 11:58

## 2022-08-14 RX ADMIN — BRIVARACETAM 50 MILLIGRAM(S): 25 TABLET, FILM COATED ORAL at 17:51

## 2022-08-14 NOTE — PROGRESS NOTE ADULT - ASSESSMENT
82 year old F PMH HTN, DM, HLD, CAD, MI, PAD, peripheral neuropathy, former smoker presented to [facility] ED on 7/19/22 with brain mass s/p Right craniotomy/resection 7/27/22. 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, primary lung  #ADL impairement  - MR brain 7/20/22 with Right temporal lobe peripherally enhancing, centrally necrotic 3.1 x 2.6 cm mass with surrounding vasogenic edema with METS  - s/p right craniotomy for resection of brain mass 7/27/22  - Bronchial biopsy consistent with poorly differentiated adenocarcinoma 7/20/22  - continue with Brivaracetam 50mg BID and decadron taper  - continue depakote 500mg q8hrs for seizure ppx  - continue comprehensive rehab program  - will need outpatient PET/CT scan, radiation therapy and further evaluation with heme/onc    #visual changes in R eye  - will order for eye drops as she takes them at home  - continue to monitor for now as no other neurological changes noted    #PERCY  - possibly due to dehydration vs hypoperfusion due to low BPs  - discontinue losartan and amlodipine  - s/p IVF with resolution of PERCY  - encourage PO fluid intake    #orthostatic  - orthostatic in PT, possibly from deconditioning and multiple BP meds  - discontinued losartan and amlodipine   - encourage PO intake  - continue to monitor     #leukocytosis  - likely in setting of recent steroid use, resolved  - afebrile, monitor closely and off abx for now  - recently treated with zosyn x5 days for UTI    #CAD  #STEMI  #new onset afib  #HTN  #HLD  - echo 8/8/22 with EF 60-65%, basal inferolateral wall, the basal inferior wall, the basal anterolateral wall, and the mid inferior wall are hypokinetic  - not candidate for PCI given risks > benefit as per cath team due to recent craniotomy   - continue asa 81mg and lopressor 25mg BID, atorvastatin 40mg daily   - continue eliquis 5mg BID  - follow up EKG  - will discontinue amlodipine and losartan for now, monitor BP    #DM type 2  - HbA1C 8.7 on 7/20  - continue lantus 18U and admelog increased to 7U TID qAC    #history of PE  - found to have RML segmental PE on 8/3  - continue Eliquis 5mg BID    #anemia  - H/H stable  - anemia of chronic disease due to malignancy as per heme    #DVT ppx  - on Eliquis

## 2022-08-14 NOTE — PROGRESS NOTE ADULT - SUBJECTIVE AND OBJECTIVE BOX
Patient is a 82y old  Female who presents with a chief complaint of metastatic adenoCA to brain s/p resection 22 (14 Aug 2022 09:30)      HPI:  Patient is a 81 YO female RH dominant with PMHx HTN, DM, HLD, CAD, MI, PAD, peripheral neuropathy, former smoker, who presented to [facility] ED on 22 with chronic back pain and gait/balance issues x 4 months. She was found to have a brain mass on imaging while undergoing work up by her orthopedist (Dr. Castro).    MR brain 22 +Right temporal lobe peripherally enhancing, centrally necrotic 3.1 x 2.6 cm mass with surrounding vasogenic edema. 8 mm leftward midline shift. Right uncal herniation. Additional left parafalcine 1.3 x 0.9 cm rim-enhancing cystic lesion with mild surrounding vasogenic edema. Tiny 0.3 x 0.3cm rim-enhancing lesion within the left frontal cortex concerning for metastatic disease. CT chest showed an obstructing left upper lobe endobronchial lesion. She had bronchial biopsy, consistent with poorly differentiated adenocarcinoma 22. Patient underwent Right craniotomy for resection of brain mass 22, Pathology +NSC carcinoma (favor adeno). Plans for out-pt PET/CT scan, RT, and consideration for systemic therapy.     Post-op course significant for STEMI 22. Not a candidate for cath secondary to comorbidities and recent surgery. She was started on ASA/ heparin gtt and a betablocker. TTE  +normal LV internal dimensions with discrete upper septal hypertrophy. EF 55%, inferolateral wall is akinetic. She was found to have RML segmental PE 8./3. Hospital course also significant for hyperkalemia, and encephalopathy, likely secondary to UTI/early urosepsis (treated with 5 days of Zosyn). EEG +Right temporal sharp waves, no seizures; started on Briviact 8/3/22. She developed new onset Afib w RVR on 22. with elevated troponin; metoprolol was increased, and heparin anticoagulation switched to Eliquis on 22.    Patient was evaluated by PM&R and therapy for functional deficits, gait/ADL impairments and acute rehabilitation was recommended. Patient was medically optimized for discharge to [facility] IRF 8/10/22. (10 Aug 2022 13:45)      PAST MEDICAL & SURGICAL HISTORY:  Hypertension      Coronary artery disease      Hyperlipidemia      Peripheral artery disease      Myocardial infarct      Peripheral neuropathy      Diabetes      Chronic back pain      Former smoker      PAD (peripheral artery disease)      CAD (coronary artery disease)  stents          MEDICATIONS  (STANDING):  atorvastatin 40 milliGRAM(s) Oral at bedtime  brivaracetam 50 milliGRAM(s) Oral every 12 hours  dexAMETHasone     Tablet   Oral   dexAMETHasone     Tablet 2 milliGRAM(s) Oral two times a day  dextrose 5%. 1000 milliLiter(s) (100 mL/Hr) IV Continuous <Continuous>  dextrose 5%. 1000 milliLiter(s) (50 mL/Hr) IV Continuous <Continuous>  dextrose 50% Injectable 25 Gram(s) IV Push once  dextrose 50% Injectable 12.5 Gram(s) IV Push once  dextrose 50% Injectable 25 Gram(s) IV Push once  diVALproex  milliGRAM(s) Oral every 8 hours  enoxaparin Injectable 40 milliGRAM(s) SubCutaneous <User Schedule>  gabapentin 200 milliGRAM(s) Oral at bedtime  glucagon  Injectable 1 milliGRAM(s) IntraMuscular once  insulin glargine Injectable (LANTUS) 18 Unit(s) SubCutaneous at bedtime  insulin lispro (ADMELOG) corrective regimen sliding scale   SubCutaneous three times a day before meals  insulin lispro (ADMELOG) corrective regimen sliding scale   SubCutaneous at bedtime  insulin lispro Injectable (ADMELOG) 7 Unit(s) SubCutaneous three times a day before meals  melatonin 6 milliGRAM(s) Oral at bedtime  metoprolol tartrate 25 milliGRAM(s) Oral two times a day  pantoprazole    Tablet 40 milliGRAM(s) Oral before breakfast  polyethylene glycol 3350 17 Gram(s) Oral two times a day  senna 2 Tablet(s) Oral at bedtime  zinc oxide 40% Paste 1 Application(s) Topical two times a day    MEDICATIONS  (PRN):  acetaminophen     Tablet .. 650 milliGRAM(s) Oral every 6 hours PRN Temp greater or equal to 38C (100.4F), Mild Pain (1 - 3)  artificial  tears Solution 1 Drop(s) Both EYES three times a day PRN Dry Eyes  bisacodyl Oral Tab/Cap - Peds 5 milliGRAM(s) Oral daily PRN Constipation  dextrose Oral Gel 15 Gram(s) Oral once PRN Blood Glucose LESS THAN 70 milliGRAM(s)/deciliter      Allergies    adhesives (Pruritus)  No Known Drug Allergies    Intolerances          VITALS  82y  Vital Signs Last 24 Hrs  T(C): 36.3 (14 Aug 2022 08:27), Max: 36.8 (13 Aug 2022 22:09)  T(F): 97.3 (14 Aug 2022 08:27), Max: 98.2 (13 Aug 2022 22:09)  HR: 55 (14 Aug 2022 08:27) (55 - 81)  BP: 111/55 (14 Aug 2022 08:27) (111/55 - 126/74)  BP(mean): --  RR: 16 (14 Aug 2022 08:) (15 - 16)  SpO2: 98% (14 Aug 2022 08:) (98% - 98%)    Parameters below as of 14 Aug 2022 08:27  Patient On (Oxygen Delivery Method): room air      Daily     Daily Weight in k (14 Aug 2022 04:32)        RECENT LABS:                          10.0   8.38  )-----------( 315      ( 14 Aug 2022 06:40 )             29.9     08-14    143  |  108  |  55<H>  ----------------------------<  94  4.0   |  24  |  1.23    Ca    8.6      14 Aug 2022 06:40                CAPILLARY BLOOD GLUCOSE      POCT Blood Glucose.: 120 mg/dL (14 Aug 2022 11:54)  POCT Blood Glucose.: 92 mg/dL (14 Aug 2022 07:56)  POCT Blood Glucose.: 141 mg/dL (13 Aug 2022 22:12)  POCT Blood Glucose.: 175 mg/dL (13 Aug 2022 17:03)      Review of Systems:   · Additional ROS	Patient fair mood, enjoys chatting with roommates. Has reduced recall mood fair, stable. Reports some dry eye in past and took systane with relief. Questionable double vision, which she has mentioned in past, but she then attrigutes to irritaon    Physical Exam:   · Constitutional Comments	alert O x 2-3   mildly dry eye, no discahrge, no swelling  EOMI, no nystagmus    incision C/D/I  · Respiratory	clear to auscultation bilaterally; no wheezes; no rales; no rhonchi; good- effort  · Cardiovascular	regular rate and rhythm; S1 S2 present; no gallops; no rub; no murmur  · Gastrointestinal	soft; nontender; nondistended; normal active bowel sounds  · Musculoskeletal	no calf tenderness  · Musculoskeletal Comments	bilateral HF 4/5 quad 4+/5 ankle PF and DF 5/5 normal tone

## 2022-08-14 NOTE — PROGRESS NOTE ADULT - ASSESSMENT
Patient is a 81 YO female with PMHx HTN, DM, HLD, CAD, MI, PAD, peripheral neuropathy, former smoker, who presented to [facility] ED on 7/19/22 with brain mass s/p Right craniotomy/resection 7/27/22, Pathology +NSC carcinoma. Post-op course significant for STEMI, hyperkalemia, encephalopathy 2/2 UTI, new onset a-fib.    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 IVF  - 111/55 - 126/74) 8/14 stable/improved    # HLD  - Lipitor 40mg daily    # DMII  - a1c 8.7 on 7/20  - ISS and FS  - increase lantus to 18U 8/12  - continue admleog 7/5/7 qA    # leukocytosis  - WBC 12.4 8/12 (increased from 11.78)--> 14.09 8/13--> 8.38 8/14  - likely from steroids.   - CBC 8/15    # RI  - encourage po fluids, discussed with patient  - s/p IVF NS bolus  - BUn/Cr  65/1.60 8/12--> 45/0.95 8/13--> 55/1.24 8/15 encourage fluids  - BMP 8/15    # Thyroid Nodule  - OP f/u with Endocrinology    # PE  - Eliquis 5mg BID    # Pain management  - Tylenol PRN  - gabapentin    # GI ppx  - Protonix 40mg  - Senna, miralax PRN    # FEN   - Diet: Consistent Carbohydrate Diabetic     # Skin:  - Skin on admission: stage 1 pressure ulcer on LLE lateral heel and right buttock, cranial staples c/d/i. Forehead open blister area with granulation tissue  - Pressure injury/Skin: Turn Q2hrs while in bed, OOB to Chair, PT/OT     # DVT ppx  - eliquis  - SCD, TEDs    # LABS  CBC BMP 8/15        Outpatient Follow-up (Specialty/Name of physician):    Willard Loomis; YUKO; MS)  Neurosurgery  [address] [facility] [facility], [address]  [place] [address]  Phone: [phone]  Fax: [phone]  Follow Up Time:     Yaron Alexander)  Hematology; Internal Medicine; Medical Oncology  [address] [facility], [address]  [place] [address]  Phone: [phone]  Fax: [phone]  Follow Up Time:     Rafael Funes  RADIATION ONCOLOGY  [address] [facility] [address] - [place] [address]  Phone: [phone]  Fax: [phone]    Satish Hopkins (MD)  Internal Medicine; Pulmonary Disease  [address] [facility], [address]  [place] [address]  Phone: [phone]  Fax: [phone]    Satish Alcaraz)  Cardiovascular Disease; Interventional Cardiology; Nuclear Cardiology  [address] [facility], [address]  [place] [address]  Phone: [phone]  Fax: [phone]     Patient is a 81 YO female with PMHx HTN, DM, HLD, CAD, MI, PAD, peripheral neuropathy, former smoker, who presented to [facility] ED on 7/19/22 with brain mass s/p Right craniotomy/resection 7/27/22, Pathology +NSC carcinoma. Post-op course significant for STEMI, hyperkalemia, encephalopathy 2/2 UTI, new onset a-fib.    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 IVF  - 111/55 - 126/74) 8/14 stable/improved    # HLD  - Lipitor 40mg daily    # DMII  - a1c 8.7 on 7/20  - ISS and FS  - increase lantus to 18U 8/12  - continue admleog 7/5/7 qAC    # leukocytosis  - WBC 12.4 8/12 (increased from 11.78)--> 14.09 8/13--> 8.38 8/14  - likely from steroids.   - CBC 8/15    # RI  - encourage po fluids, discussed with patient  - s/p IVF NS bolus  - BUn/Cr  65/1.60 8/12--> 45/0.95 8/13--> 55/1.24 8/15 encourage fluids  - BMP 8/15    # Thyroid Nodule  - OP f/u with Endocrinology    # PE  - Eliquis 5mg BID    # Pain management  - Tylenol PRN  - gabapentin    # GI ppx  - Protonix 40mg  - Senna, miralax PRN    # FEN   - Diet: Consistent Carbohydrate Diabetic     # Skin:  - Skin on admission: stage 1 pressure ulcer on LLE lateral heel and right buttock, cranial staples c/d/i. Forehead open blister area with granulation tissue  - Pressure injury/Skin: Turn Q2hrs while in bed, OOB to Chair, PT/OT     # DVT ppx  - eliquis  - SCD, TEDs    # LABS  CBC BMP 8/15        Outpatient Follow-up (Specialty/Name of physician):    Willard Loomis; YUKO; MS)  Neurosurgery  [address] [facility], [address]  [place] [address]  Phone: [phone]  Fax: [phone]  Follow Up Time:     Yaron Alexander)  Hematology; Internal Medicine; Medical Oncology  [address] [facility], [address]  [place] [address]  Phone: [phone]  Fax: [phone]  Follow Up Time:     Rafael Funes  RADIATION ONCOLOGY  [address] [facility] [address] [place] [address]  Phone: [phone]  Fax: [phone]    Satish Hopkins (MD)  Internal Medicine; Pulmonary Disease  [address] [facility], [address]  [place] [address]  Phone: [phone]  Fax: [phone]    Satish Alcaraz)  Cardiovascular Disease; Interventional Cardiology; Nuclear Cardiology  [address] [facility], [address]  [place] [address]  Phone: [phone]  Fax: [phone]

## 2022-08-14 NOTE — PROGRESS NOTE ADULT - SUBJECTIVE AND OBJECTIVE BOX
Patient is a 82y old  Female who presents with a chief complaint of meetastatic adenoCA to brain s/p resection 7/27/22 (13 Aug 2022 13:05)    Patient seen and examined at bedside. No events overnight. Denies headache, chest pain or palpitations. Patient states that yesterday she had an episode of double vision where she saw 2 of her roommate and self resolved, happened again this morning but resolved. Denies blurry vision, states when one eye is closed at a time then no visual changes but happens when both eyes open, currently not having these symptoms. Hasn't noticed if this has happened since the surgery, states she also feels her eye is dry and uses eye drops at home. No other neurological changes    ALLERGIES:  adhesives (Pruritus)  No Known Drug Allergies    MEDICATIONS  (STANDING):  atorvastatin 40 milliGRAM(s) Oral at bedtime  brivaracetam 50 milliGRAM(s) Oral every 12 hours  dexAMETHasone     Tablet   Oral   dexAMETHasone     Tablet 2 milliGRAM(s) Oral two times a day  dextrose 5%. 1000 milliLiter(s) (100 mL/Hr) IV Continuous <Continuous>  dextrose 5%. 1000 milliLiter(s) (50 mL/Hr) IV Continuous <Continuous>  dextrose 50% Injectable 25 Gram(s) IV Push once  dextrose 50% Injectable 12.5 Gram(s) IV Push once  dextrose 50% Injectable 25 Gram(s) IV Push once  diVALproex  milliGRAM(s) Oral every 8 hours  enoxaparin Injectable 40 milliGRAM(s) SubCutaneous <User Schedule>  gabapentin 200 milliGRAM(s) Oral at bedtime  glucagon  Injectable 1 milliGRAM(s) IntraMuscular once  insulin glargine Injectable (LANTUS) 18 Unit(s) SubCutaneous at bedtime  insulin lispro (ADMELOG) corrective regimen sliding scale   SubCutaneous three times a day before meals  insulin lispro (ADMELOG) corrective regimen sliding scale   SubCutaneous at bedtime  insulin lispro Injectable (ADMELOG) 7 Unit(s) SubCutaneous three times a day before meals  melatonin 6 milliGRAM(s) Oral at bedtime  metoprolol tartrate 25 milliGRAM(s) Oral two times a day  pantoprazole    Tablet 40 milliGRAM(s) Oral before breakfast  polyethylene glycol 3350 17 Gram(s) Oral two times a day  senna 2 Tablet(s) Oral at bedtime  zinc oxide 40% Paste 1 Application(s) Topical two times a day    MEDICATIONS  (PRN):  acetaminophen     Tablet .. 650 milliGRAM(s) Oral every 6 hours PRN Temp greater or equal to 38C (100.4F), Mild Pain (1 - 3)  artificial  tears Solution 1 Drop(s) Both EYES three times a day PRN Dry Eyes  bisacodyl Oral Tab/Cap - Peds 5 milliGRAM(s) Oral daily PRN Constipation  dextrose Oral Gel 15 Gram(s) Oral once PRN Blood Glucose LESS THAN 70 milliGRAM(s)/deciliter    Vital Signs Last 24 Hrs  T(F): 97.3 (14 Aug 2022 08:27), Max: 98.2 (13 Aug 2022 22:09)  HR: 55 (14 Aug 2022 08:27) (55 - 81)  BP: 111/55 (14 Aug 2022 08:27) (111/55 - 126/74)  RR: 16 (14 Aug 2022 08:27) (15 - 16)  SpO2: 98% (14 Aug 2022 08:27) (98% - 98%)  I&O's Summary      PHYSICAL EXAM:  GENERAL: NAD, laying in bed  HEAD:  Normocephalic, incision site on R parietal region c/d/i, forehead blister  EYES: PERRL, conjunctiva and sclera clear  ENMT: Moist mucous membranes, Good dentition  CHEST/LUNG: Clear to auscultation bilaterally, non-labored breathing, good air entry  HEART: RRR; S1/S2, No murmur  ABDOMEN: Soft, Nontender, Nondistended; Bowel sounds present  VASCULAR: Normal pulses, Normal capillary refill  EXTREMITIES: No cyanosis, No edema  NEUROLOGICAL: CN 2-12 intact  SKIN: Warm, perfused    LABS:                        10.0   8.38  )-----------( 315      ( 14 Aug 2022 06:40 )             29.9     08-14    143  |  108  |  55  ----------------------------<  94  4.0   |  24  |  1.23    Ca    8.6      14 Aug 2022 06:40                08-01 Chol 136 mg/dL LDL -- HDL 59 mg/dL Trig 73 mg/dL              POCT Blood Glucose.: 92 mg/dL (14 Aug 2022 07:56)  POCT Blood Glucose.: 141 mg/dL (13 Aug 2022 22:12)  POCT Blood Glucose.: 175 mg/dL (13 Aug 2022 17:03)  POCT Blood Glucose.: 215 mg/dL (13 Aug 2022 12:22)          COVID-19 PCR: NotDetec (08-10-22 @ 17:00)  COVID-19 PCR: NotDetec (08-10-22 @ 07:27)  COVID-19 PCR: NotDetec (08-06-22 @ 06:46)  COVID-19 PCR: NotDetec (07-31-22 @ 07:45)  COVID-19 PCR: NotDetec (07-24-22 @ 06:24)    RADIOLOGY & ADDITIONAL TESTS: reviewed labs    Care Discussed with Consultants/Other Providers:

## 2022-08-15 LAB
GLUCOSE BLDC GLUCOMTR-MCNC: 149 MG/DL — HIGH (ref 70–99)
GLUCOSE BLDC GLUCOMTR-MCNC: 149 MG/DL — HIGH (ref 70–99)
GLUCOSE BLDC GLUCOMTR-MCNC: 160 MG/DL — HIGH (ref 70–99)
GLUCOSE BLDC GLUCOMTR-MCNC: 206 MG/DL — HIGH (ref 70–99)

## 2022-08-15 PROCEDURE — 99232 SBSQ HOSP IP/OBS MODERATE 35: CPT

## 2022-08-15 RX ADMIN — ATORVASTATIN CALCIUM 40 MILLIGRAM(S): 80 TABLET, FILM COATED ORAL at 22:17

## 2022-08-15 RX ADMIN — Medication 6 MILLIGRAM(S): at 22:17

## 2022-08-15 RX ADMIN — INSULIN GLARGINE 18 UNIT(S): 100 INJECTION, SOLUTION SUBCUTANEOUS at 22:18

## 2022-08-15 RX ADMIN — ZINC OXIDE 1 APPLICATION(S): 200 OINTMENT TOPICAL at 05:25

## 2022-08-15 RX ADMIN — Medication 7 UNIT(S): at 17:12

## 2022-08-15 RX ADMIN — Medication 4: at 12:26

## 2022-08-15 RX ADMIN — Medication 7 UNIT(S): at 12:27

## 2022-08-15 RX ADMIN — Medication 2: at 17:11

## 2022-08-15 RX ADMIN — Medication 7 UNIT(S): at 08:07

## 2022-08-15 RX ADMIN — ENOXAPARIN SODIUM 40 MILLIGRAM(S): 100 INJECTION SUBCUTANEOUS at 17:11

## 2022-08-15 RX ADMIN — BRIVARACETAM 50 MILLIGRAM(S): 25 TABLET, FILM COATED ORAL at 17:16

## 2022-08-15 RX ADMIN — DIVALPROEX SODIUM 500 MILLIGRAM(S): 500 TABLET, DELAYED RELEASE ORAL at 13:07

## 2022-08-15 RX ADMIN — SENNA PLUS 2 TABLET(S): 8.6 TABLET ORAL at 22:17

## 2022-08-15 RX ADMIN — GABAPENTIN 200 MILLIGRAM(S): 400 CAPSULE ORAL at 22:17

## 2022-08-15 RX ADMIN — DIVALPROEX SODIUM 500 MILLIGRAM(S): 500 TABLET, DELAYED RELEASE ORAL at 22:17

## 2022-08-15 RX ADMIN — Medication 1 DROP(S): at 17:11

## 2022-08-15 RX ADMIN — ZINC OXIDE 1 APPLICATION(S): 200 OINTMENT TOPICAL at 17:18

## 2022-08-15 RX ADMIN — Medication 2 MILLIGRAM(S): at 05:25

## 2022-08-15 RX ADMIN — BRIVARACETAM 50 MILLIGRAM(S): 25 TABLET, FILM COATED ORAL at 05:25

## 2022-08-15 RX ADMIN — Medication 25 MILLIGRAM(S): at 05:25

## 2022-08-15 RX ADMIN — Medication 1 DROP(S): at 22:18

## 2022-08-15 RX ADMIN — DIVALPROEX SODIUM 500 MILLIGRAM(S): 500 TABLET, DELAYED RELEASE ORAL at 05:25

## 2022-08-15 RX ADMIN — PANTOPRAZOLE SODIUM 40 MILLIGRAM(S): 20 TABLET, DELAYED RELEASE ORAL at 05:25

## 2022-08-15 NOTE — PROGRESS NOTE ADULT - ASSESSMENT
Patient is a 83 YO female with PMHx HTN, DM, HLD, CAD, MI, PAD, peripheral neuropathy, former smoker, who presented to [facility] ED on 7/19/22 with brain mass s/p Right craniotomy/resection 7/27/22, Pathology +NSC carcinoma. Post-op course significant for STEMI, hyperkalemia, encephalopathy 2/2 UTI, new onset a-fib.    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 diplopia    # HTN/CAD/A-fib  - c/w ASA  - Eliquis 5mg BID  - Metoprolol 25mg BID  - discontinue amlodipine and losartan due to soft BP  - encourage po fluids  - (112/63 - 141/58) 8/15 improved    # HLD  - Lipitor 40mg daily    # DMII  - a1c 8.7 on 7/20  - ISS and FS  - increase lantus to 18U 8/12  - continue admleog 7/5/7 qAC    # leukocytosis  - likely from steroids.   - WBC 12.4 8/12 -> 8.38 8/14 resolved  - CBC 8/18    # RI  - encourage po fluids, discussed with patient  - s/p IVF NS bolus  - BUn/Cr  65/1.60 8/12--> 45/0.95 8/13--> 55/1.24 8/14  - encourage fluids  - BMP 8/16    # Thyroid Nodule  - f/u with Endocrinology on dc    # PE  - Eliquis 5mg BID    # Pain management  - Tylenol PRN  - gabapentin    # GI ppx  - Protonix 40mg  - Senna, miralax PRN    # FEN   - Diet: Consistent Carbohydrate Diabetic     # Skin:  - Skin on admission: stage 1 pressure ulcer on LLE lateral heel and right buttock, cranial staples c/d/i. Forehead open blister area with granulation tissue  - Pressure injury/Skin: Turn Q2hrs while in bed, OOB to Chair, PT/OT     # DVT ppx  - eliquis  - SCD, TEDs    # Case discussed in IDT rounds 8/15. For tentative dc date home 8/24 with caregiver support during waking hours at supervision/CG level, and home PT, OT SLP  - will need caregiver training      # LABS  BMP 8/16  ophtho consult  CBC BMP 8/18        Outpatient Follow-up (Specialty/Name of physician):    Willard Loomis; YUKO; MS)  Neurosurgery  [address] [facility] [facility], [address]  [place] [address]  Phone: [phone]  Fax: [phone]  Follow Up Time:     Yaron Alexander (MD)  Hematology; Internal Medicine; Medical Oncology  [address] [facility] [facility], [address]  [place] [address]  Phone: [phone]  Fax: [phone]  Follow Up Time:     Rafael Funes  RADIATION ONCOLOGY  [address] [place] [address] A [place] [address]  Phone: [phone]  Fax: [phone]    Satish Hopkins (MD)  Internal Medicine; Pulmonary Disease  [address] [facility], [address]  [place] [address]  Phone: [phone]  Fax: [phone]    Satish Alcaraz)  Cardiovascular Disease; Interventional Cardiology; Nuclear Cardiology  [address] [facility], [address]  [place] [address]  Phone: [phone]  Fax: [phone]     Patient is a 83 YO female with PMHx HTN, DM, HLD, CAD, MI, PAD, peripheral neuropathy, former smoker, who presented to [facility] ED on 7/19/22 with brain mass s/p Right craniotomy/resection 7/27/22, Pathology +NSC carcinoma. Post-op course significant for STEMI, hyperkalemia, encephalopathy 2/2 UTI, new onset a-fib.    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 diplopia    # HTN/CAD/A-fib  - c/w ASA  - Eliquis 5mg BID  - Metoprolol 25mg BID  - discontinue amlodipine and losartan due to soft BP  - encourage po fluids  - (112/63 - 141/58) 8/15 improved    # HLD  - Lipitor 40mg daily    # DMII  - a1c 8.7 on 7/20  - ISS and FS  - increase lantus to 18U 8/12  - continue admleog 7/5/7 qAC    # leukocytosis  - likely from steroids.   - WBC 12.4 8/12 -> 8.38 8/14 resolved  - CBC 8/18    # RI  - encourage po fluids, discussed with patient  - s/p IVF NS bolus  - BUn/Cr  65/1.60 8/12--> 45/0.95 8/13--> 55/1.24 8/14  - encourage fluids  - BMP 8/16    # Thyroid Nodule  - f/u with Endocrinology on dc    # PE  - Eliquis 5mg BID    # Pain management  - Tylenol PRN  - gabapentin    # GI ppx  - Protonix 40mg  - Senna, miralax PRN    # FEN   - Diet: Consistent Carbohydrate Diabetic     # Skin:  - Skin on admission: stage 1 pressure ulcer on LLE lateral heel and right buttock, cranial staples c/d/i. Forehead open blister area with granulation tissue  - Pressure injury/Skin: Turn Q2hrs while in bed, OOB to Chair, PT/OT     # DVT ppx  - eliquis  - SCD, TEDs    # Case discussed in IDT rounds 8/15. For tentative dc date home 8/24 with caregiver support during waking hours at supervision/CG level, and home PT, OT SLP  - will need caregiver training      # LABS  [facility] 8/16  ophtho consult  CBC [facility] 8/18    addendum 3:11 PM: reached out to patient's private ophthalmologist Dr. Grady. 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 continue artificial tests 3-4 x daily as needed, will cancel in-house ophtho consult        Outpatient Follow-up (Specialty/Name of physician):    Willard Loomis; YUKO; MS)  Neurosurgery  [address] [facility], [address]  [place] [address]  Phone: [phone]  Fax: [phone]  Follow Up Time:     Yaron Alexander)  Hematology; Internal Medicine; Medical Oncology  [address] [facility], [address]  [place] [address]  Phone: [phone]  Fax: [phone]  Follow Up Time:     Rafael Funes  RADIATION ONCOLOGY  [address] [facility] [place] [address]  Phone: [phone]  Fax: [phone]    Satish Hopkins)  Internal Medicine; Pulmonary Disease  [address] [facility], [address]  [place] [address]  Phone: [phone]  Fax: [phone]    Satish Alcaraz)  Cardiovascular Disease; Interventional Cardiology; Nuclear Cardiology  [address] [facility] [address] [address], [address]  [place] [address]  Phone: [phone]  Fax: [phone]

## 2022-08-15 NOTE — PROGRESS NOTE ADULT - ASSESSMENT
82 year old F PMH HTN, DM, HLD, CAD, MI, PAD, peripheral neuropathy, former smoker presented to [facility] ED on 7/19/22 with brain mass s/p Right craniotomy/resection 7/27/22. 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, primary lung  #ADL impairement  - MR brain 7/20/22 with Right temporal lobe peripherally enhancing, centrally necrotic 3.1 x 2.6 cm mass with surrounding vasogenic edema with METS  - s/p right craniotomy for resection of brain mass 7/27/22  - Bronchial biopsy consistent with poorly differentiated adenocarcinoma 7/20/22  - continue with Brivaracetam 50mg BID and decadron taper  - continue depakote 500mg q8hrs for seizure ppx  - will need outpatient PET/CT scan, radiation therapy and further evaluation with heme/onc    #PERCY  - Resolved  - encourage PO fluid intake    #orthostatic  - orthostatic in PT, possibly from deconditioning and multiple BP meds  - Off antihypertensives losartan and amlodipine   - encourage PO intake  - continue to monitor     #leukocytosis  - Resolved    #CAD  #STEMI  #new onset afib  #HTN  #HLD  - echo 8/8/22 with EF 60-65%, basal inferolateral wall, the basal inferior wall, the basal anterolateral wall, and the mid inferior wall are hypokinetic  - not candidate for PCI given risks > benefit as per cath team due to recent craniotomy   - continue asa 81mg and lopressor 25mg BID, atorvastatin 40mg daily   - continue eliquis 5mg BID  - follow up EKG  - Off antihypertensives losartan and amlodipine     #DM type 2  - HbA1C 8.7 on 7/20  - continue lantus 18U and admelog 7U TID qAC    #history of PE  - found to have RML segmental PE on 8/3  - continue Eliquis 5mg BID    #anemia  - H/H stable  - anemia of chronic disease due to malignancy as per heme    #DVT ppx  - on Eliquis

## 2022-08-15 NOTE — PROGRESS NOTE ADULT - SUBJECTIVE AND OBJECTIVE BOX
Patient is a 82y old  Female who presents with a chief complaint of meetastatic adenoCA to brain s/p resection 22 (15 Aug 2022 11:57)      HPI:  Patient is a 83 YO female RH dominant with PMHx HTN, DM, HLD, CAD, MI, PAD, peripheral neuropathy, former smoker, who presented to [facility] ED on 22 with chronic back pain and gait/balance issues x 4 months. She was found to have a brain mass on imaging while undergoing work up by her orthopedist (Dr. Castro).    MR brain 22 +Right temporal lobe peripherally enhancing, centrally necrotic 3.1 x 2.6 cm mass with surrounding vasogenic edema. 8 mm leftward midline shift. Right uncal herniation. Additional left parafalcine 1.3 x 0.9 cm rim-enhancing cystic lesion with mild surrounding vasogenic edema. Tiny 0.3 x 0.3cm rim-enhancing lesion within the left frontal cortex concerning for metastatic disease. CT chest showed an obstructing left upper lobe endobronchial lesion. She had bronchial biopsy, consistent with poorly differentiated adenocarcinoma 22. Patient underwent Right craniotomy for resection of brain mass 22, Pathology +NSC carcinoma (favor adeno). Plans for out-pt PET/CT scan, RT, and consideration for systemic therapy.     Post-op course significant for STEMI 22. Not a candidate for cath secondary to comorbidities and recent surgery. She was started on ASA/ heparin gtt and a betablocker. TTE  +normal LV internal dimensions with discrete upper septal hypertrophy. EF 55%, inferolateral wall is akinetic. She was found to have RML segmental PE 8./3. Hospital course also significant for hyperkalemia, and encephalopathy, likely secondary to UTI/early urosepsis (treated with 5 days of Zosyn). EEG +Right temporal sharp waves, no seizures; started on Briviact 8/3/22. She developed new onset Afib w RVR on 22. with elevated troponin; metoprolol was increased, and heparin anticoagulation switched to Eliquis on 22.    Patient was evaluated by PM&R and therapy for functional deficits, gait/ADL impairments and acute rehabilitation was recommended. Patient was medically optimized for discharge to [facility] IRF 8/10/22. (10 Aug 2022 13:45)      PAST MEDICAL & SURGICAL HISTORY:  Hypertension      Coronary artery disease      Hyperlipidemia      Peripheral artery disease      Myocardial infarct      Peripheral neuropathy      Diabetes      Chronic back pain      Former smoker      PAD (peripheral artery disease)      CAD (coronary artery disease)  stents          MEDICATIONS  (STANDING):  atorvastatin 40 milliGRAM(s) Oral at bedtime  brivaracetam 50 milliGRAM(s) Oral every 12 hours  dexAMETHasone     Tablet   Oral   dexAMETHasone     Tablet 2 milliGRAM(s) Oral daily  dextrose 5%. 1000 milliLiter(s) (100 mL/Hr) IV Continuous <Continuous>  dextrose 5%. 1000 milliLiter(s) (50 mL/Hr) IV Continuous <Continuous>  dextrose 50% Injectable 25 Gram(s) IV Push once  dextrose 50% Injectable 12.5 Gram(s) IV Push once  dextrose 50% Injectable 25 Gram(s) IV Push once  diVALproex  milliGRAM(s) Oral every 8 hours  enoxaparin Injectable 40 milliGRAM(s) SubCutaneous <User Schedule>  gabapentin 200 milliGRAM(s) Oral at bedtime  glucagon  Injectable 1 milliGRAM(s) IntraMuscular once  insulin glargine Injectable (LANTUS) 18 Unit(s) SubCutaneous at bedtime  insulin lispro (ADMELOG) corrective regimen sliding scale   SubCutaneous at bedtime  insulin lispro (ADMELOG) corrective regimen sliding scale   SubCutaneous three times a day before meals  insulin lispro Injectable (ADMELOG) 7 Unit(s) SubCutaneous three times a day before meals  melatonin 6 milliGRAM(s) Oral at bedtime  metoprolol tartrate 25 milliGRAM(s) Oral two times a day  pantoprazole    Tablet 40 milliGRAM(s) Oral before breakfast  polyethylene glycol 3350 17 Gram(s) Oral two times a day  senna 2 Tablet(s) Oral at bedtime  zinc oxide 40% Paste 1 Application(s) Topical two times a day    MEDICATIONS  (PRN):  acetaminophen     Tablet .. 650 milliGRAM(s) Oral every 6 hours PRN Temp greater or equal to 38C (100.4F), Mild Pain (1 - 3)  artificial  tears Solution 1 Drop(s) Both EYES three times a day PRN Dry Eyes  bisacodyl Oral Tab/Cap - Peds 5 milliGRAM(s) Oral daily PRN Constipation  dextrose Oral Gel 15 Gram(s) Oral once PRN Blood Glucose LESS THAN 70 milliGRAM(s)/deciliter      Allergies    adhesives (Pruritus)  No Known Drug Allergies    Intolerances          VITALS  82y  Vital Signs Last 24 Hrs  T(C): 36.3 (15 Aug 2022 08:18), Max: 36.3 (15 Aug 2022 08:18)  T(F): 97.3 (15 Aug 2022 08:18), Max: 97.3 (15 Aug 2022 08:18)  HR: 69 (15 Aug 2022 08:18) (69 - 77)  BP: 141/58 (15 Aug 2022 08:18) (112/63 - 141/58)  BP(mean): --  RR: 16 (15 Aug 2022 08:18) (16 - 16)  SpO2: 98% (15 Aug 2022 08:18) (98% - 98%)    Parameters below as of 15 Aug 2022 08:18  Patient On (Oxygen Delivery Method): room air      Daily     Daily Weight in k.5 (15 Aug 2022 04:19)        RECENT LABS:                          10.0   8.38  )-----------( 315      ( 14 Aug 2022 06:40 )             29.9     08-14    143  |  108  |  55<H>  ----------------------------<  94  4.0   |  24  |  1.23    Ca    8.6      14 Aug 2022 06:40                CAPILLARY BLOOD GLUCOSE      POCT Blood Glucose.: 206 mg/dL (15 Aug 2022 12:10)  POCT Blood Glucose.: 149 mg/dL (15 Aug 2022 07:40)  POCT Blood Glucose.: 163 mg/dL (14 Aug 2022 22:36)  POCT Blood Glucose.: 107 mg/dL (14 Aug 2022 17:44)        Review of Systems:   · Additional ROS	Patient did not receive eye drops, currently written for PRN. Will switch to standing. She still feels some irritaiton in right eye; no discharge, no swelling, no pain. has history of cataracts, had laser surgery, but states that there is something off with her vision (?diplopia) with binocular input, none monocular    Physical Exam:   · Constitutional Comments	alert O x 2-3   conjunctiva clear. no discharge/weeping  appears slightly mproved with attention although benefits from redirection. good arousal    right cranial incision healing well, C/D/I no swelling  · Respiratory	clear to auscultation bilaterally; no wheezes; no rales; no rhonchi; good- effort  · Cardiovascular	regular rate and rhythm; S1 S2 present; no gallops; no rub; no murmur  · Gastrointestinal	soft; nontender; nondistended; normal active bowel sounds  · Musculoskeletal	no calf tenderness no erythema or warmth  · Musculoskeletal Comments	bilateral HF 4/5 quad 4+/5 ankle PF and DF 5/5 normal tone

## 2022-08-15 NOTE — PROGRESS NOTE ADULT - SUBJECTIVE AND OBJECTIVE BOX
Patient is a 82y old  Female who presents with a chief complaint of meetastatic adenoCA to brain s/p resection 7/27/22 (14 Aug 2022 13:29)      SUBJECTIVE / OVERNIGHT EVENTS:  Pt seen and examined at bedside. No acute events overnight.  Pt denies cp, palpitations, sob, abd pain, N/V, fever, chills.    ROS:  All other review of systems negative    Allergies    adhesives (Pruritus)  No Known Drug Allergies    Intolerances        MEDICATIONS  (STANDING):  atorvastatin 40 milliGRAM(s) Oral at bedtime  brivaracetam 50 milliGRAM(s) Oral every 12 hours  dexAMETHasone     Tablet   Oral   dexAMETHasone     Tablet 2 milliGRAM(s) Oral daily  dextrose 5%. 1000 milliLiter(s) (50 mL/Hr) IV Continuous <Continuous>  dextrose 5%. 1000 milliLiter(s) (100 mL/Hr) IV Continuous <Continuous>  dextrose 50% Injectable 25 Gram(s) IV Push once  dextrose 50% Injectable 12.5 Gram(s) IV Push once  dextrose 50% Injectable 25 Gram(s) IV Push once  diVALproex  milliGRAM(s) Oral every 8 hours  enoxaparin Injectable 40 milliGRAM(s) SubCutaneous <User Schedule>  gabapentin 200 milliGRAM(s) Oral at bedtime  glucagon  Injectable 1 milliGRAM(s) IntraMuscular once  insulin glargine Injectable (LANTUS) 18 Unit(s) SubCutaneous at bedtime  insulin lispro (ADMELOG) corrective regimen sliding scale   SubCutaneous three times a day before meals  insulin lispro (ADMELOG) corrective regimen sliding scale   SubCutaneous at bedtime  insulin lispro Injectable (ADMELOG) 7 Unit(s) SubCutaneous three times a day before meals  melatonin 6 milliGRAM(s) Oral at bedtime  metoprolol tartrate 25 milliGRAM(s) Oral two times a day  pantoprazole    Tablet 40 milliGRAM(s) Oral before breakfast  polyethylene glycol 3350 17 Gram(s) Oral two times a day  senna 2 Tablet(s) Oral at bedtime  zinc oxide 40% Paste 1 Application(s) Topical two times a day    MEDICATIONS  (PRN):  acetaminophen     Tablet .. 650 milliGRAM(s) Oral every 6 hours PRN Temp greater or equal to 38C (100.4F), Mild Pain (1 - 3)  artificial  tears Solution 1 Drop(s) Both EYES three times a day PRN Dry Eyes  bisacodyl Oral Tab/Cap - Peds 5 milliGRAM(s) Oral daily PRN Constipation  dextrose Oral Gel 15 Gram(s) Oral once PRN Blood Glucose LESS THAN 70 milliGRAM(s)/deciliter      Vital Signs Last 24 Hrs  T(C): 36.3 (15 Aug 2022 08:18), Max: 36.3 (15 Aug 2022 08:18)  T(F): 97.3 (15 Aug 2022 08:18), Max: 97.3 (15 Aug 2022 08:18)  HR: 69 (15 Aug 2022 08:18) (69 - 77)  BP: 141/58 (15 Aug 2022 08:18) (112/63 - 141/58)  BP(mean): --  RR: 16 (15 Aug 2022 08:18) (16 - 16)  SpO2: 98% (15 Aug 2022 08:18) (98% - 98%)    Parameters below as of 15 Aug 2022 08:18  Patient On (Oxygen Delivery Method): room air      CAPILLARY BLOOD GLUCOSE      POCT Blood Glucose.: 149 mg/dL (15 Aug 2022 07:40)  POCT Blood Glucose.: 163 mg/dL (14 Aug 2022 22:36)  POCT Blood Glucose.: 107 mg/dL (14 Aug 2022 17:44)    I&O's Summary      PHYSICAL EXAM:  GENERAL: NAD, well-developed female  HEAD:  Surgical incision c/d/i  EYES: EOMI, PERRLA, conjunctiva and sclera clear  NECK: Supple, No JVD  CHEST/LUNG: Clear to auscultation bilaterally; No wheeze, nonlabored breathing  HEART: Regular rate and rhythm; No murmurs, rubs, or gallops  ABDOMEN: Soft, Nontender, Nondistended; Bowel sounds present  EXTREMITIES:  2+ Peripheral Pulses, No clubbing, cyanosis, or edema  PSYCH: calm, appropriate mood  SKIN: + Forehead blister with fluid drained    LABS:                        10.0   8.38  )-----------( 315      ( 14 Aug 2022 06:40 )             29.9     08-14    143  |  108  |  55<H>  ----------------------------<  94  4.0   |  24  |  1.23    Ca    8.6      14 Aug 2022 06:40                RADIOLOGY & ADDITIONAL TESTS:  Results Reviewed:   Imaging Personally Reviewed:  Electrocardiogram Personally Reviewed:    COORDINATION OF CARE:  Care Discussed with Consultants/Other Providers [Y/N]:  Prior or Outpatient Records Reviewed [Y/N]:

## 2022-08-16 LAB
ANION GAP SERPL CALC-SCNC: 10 MMOL/L — SIGNIFICANT CHANGE UP (ref 5–17)
BUN SERPL-MCNC: 57 MG/DL — HIGH (ref 7–23)
CALCIUM SERPL-MCNC: 9.2 MG/DL — SIGNIFICANT CHANGE UP (ref 8.4–10.5)
CHLORIDE SERPL-SCNC: 109 MMOL/L — HIGH (ref 96–108)
CO2 SERPL-SCNC: 25 MMOL/L — SIGNIFICANT CHANGE UP (ref 22–31)
CREAT SERPL-MCNC: 1.04 MG/DL — SIGNIFICANT CHANGE UP (ref 0.5–1.3)
EGFR: 54 ML/MIN/1.73M2 — LOW
GLUCOSE BLDC GLUCOMTR-MCNC: 174 MG/DL — HIGH (ref 70–99)
GLUCOSE BLDC GLUCOMTR-MCNC: 72 MG/DL — SIGNIFICANT CHANGE UP (ref 70–99)
GLUCOSE BLDC GLUCOMTR-MCNC: 78 MG/DL — SIGNIFICANT CHANGE UP (ref 70–99)
GLUCOSE BLDC GLUCOMTR-MCNC: 85 MG/DL — SIGNIFICANT CHANGE UP (ref 70–99)
GLUCOSE SERPL-MCNC: 71 MG/DL — SIGNIFICANT CHANGE UP (ref 70–99)
POTASSIUM SERPL-MCNC: 3.8 MMOL/L — SIGNIFICANT CHANGE UP (ref 3.5–5.3)
POTASSIUM SERPL-SCNC: 3.8 MMOL/L — SIGNIFICANT CHANGE UP (ref 3.5–5.3)
SODIUM SERPL-SCNC: 144 MMOL/L — SIGNIFICANT CHANGE UP (ref 135–145)

## 2022-08-16 PROCEDURE — 99232 SBSQ HOSP IP/OBS MODERATE 35: CPT

## 2022-08-16 RX ORDER — POLYETHYLENE GLYCOL 3350 17 G/17G
17 POWDER, FOR SOLUTION ORAL DAILY
Refills: 0 | Status: DISCONTINUED | OUTPATIENT
Start: 2022-08-16 | End: 2022-08-26

## 2022-08-16 RX ADMIN — BRIVARACETAM 50 MILLIGRAM(S): 25 TABLET, FILM COATED ORAL at 05:20

## 2022-08-16 RX ADMIN — Medication 1 DROP(S): at 14:16

## 2022-08-16 RX ADMIN — Medication 25 MILLIGRAM(S): at 17:18

## 2022-08-16 RX ADMIN — BRIVARACETAM 50 MILLIGRAM(S): 25 TABLET, FILM COATED ORAL at 17:06

## 2022-08-16 RX ADMIN — Medication 2: at 12:31

## 2022-08-16 RX ADMIN — INSULIN GLARGINE 18 UNIT(S): 100 INJECTION, SOLUTION SUBCUTANEOUS at 22:19

## 2022-08-16 RX ADMIN — Medication 2 MILLIGRAM(S): at 05:21

## 2022-08-16 RX ADMIN — Medication 6 MILLIGRAM(S): at 22:20

## 2022-08-16 RX ADMIN — DIVALPROEX SODIUM 500 MILLIGRAM(S): 500 TABLET, DELAYED RELEASE ORAL at 14:16

## 2022-08-16 RX ADMIN — ENOXAPARIN SODIUM 40 MILLIGRAM(S): 100 INJECTION SUBCUTANEOUS at 17:06

## 2022-08-16 RX ADMIN — Medication 7 UNIT(S): at 17:05

## 2022-08-16 RX ADMIN — GABAPENTIN 200 MILLIGRAM(S): 400 CAPSULE ORAL at 22:20

## 2022-08-16 RX ADMIN — DIVALPROEX SODIUM 500 MILLIGRAM(S): 500 TABLET, DELAYED RELEASE ORAL at 05:21

## 2022-08-16 RX ADMIN — ATORVASTATIN CALCIUM 40 MILLIGRAM(S): 80 TABLET, FILM COATED ORAL at 22:21

## 2022-08-16 RX ADMIN — Medication 1 DROP(S): at 05:21

## 2022-08-16 RX ADMIN — Medication 1 DROP(S): at 22:20

## 2022-08-16 RX ADMIN — DIVALPROEX SODIUM 500 MILLIGRAM(S): 500 TABLET, DELAYED RELEASE ORAL at 22:21

## 2022-08-16 RX ADMIN — Medication 7 UNIT(S): at 12:33

## 2022-08-16 RX ADMIN — Medication 7 UNIT(S): at 08:31

## 2022-08-16 RX ADMIN — PANTOPRAZOLE SODIUM 40 MILLIGRAM(S): 20 TABLET, DELAYED RELEASE ORAL at 05:21

## 2022-08-16 RX ADMIN — ZINC OXIDE 1 APPLICATION(S): 200 OINTMENT TOPICAL at 17:18

## 2022-08-16 RX ADMIN — SENNA PLUS 2 TABLET(S): 8.6 TABLET ORAL at 22:20

## 2022-08-16 RX ADMIN — Medication 25 MILLIGRAM(S): at 05:21

## 2022-08-16 RX ADMIN — ZINC OXIDE 1 APPLICATION(S): 200 OINTMENT TOPICAL at 05:20

## 2022-08-16 NOTE — PROGRESS NOTE ADULT - ASSESSMENT
Patient is a 83 YO female with PMHx HTN, DM, HLD, CAD, MI, PAD, peripheral neuropathy, former smoker, who presented to [facility] ED on 7/19/22 with brain mass s/p Right craniotomy/resection 7/27/22, Pathology +NSC carcinoma. Post-op course significant for STEMI, hyperkalemia, encephalopathy 2/2 UTI, new onset a-fib.    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 diplopia    # HTN/CAD/A-fib  - c/w ASA  - Eliquis 5mg BID w/protonix GI ppx  - Metoprolol 25mg BID  - discontinue amlodipine and losartan due to soft BP  - encourage po fluids  - Lipitor 40mg daily continues    # DMII  - a1c 8.7 on 7/20  - ISS and FS  - lantus / admleog per hospitalist    # leukocytosis  - likely from steroids.   - afebrile  - CBC 8/18    # RI  - s/p IVF NS bolus prior  - BUn/Cr improved  - encourage fluids    # Thyroid Nodule  - f/u with Endocrinology on dc    # PE  - Eliquis 5mg BID    # Pain management  - Tylenol PRN  - gabapentin    # GI ppx  - Protonix 40mg  - Senna, miralax PRN    # FEN   - Diet: Consistent Carbohydrate Diabetic     # Skin:  - Skin on admission: stage 1 pressure ulcer on LLE lateral heel and right buttock, cranial staples c/d/i. Forehead open blister area with granulation tissue  - Pressure injury/Skin: Turn Q2hrs while in bed, OOB to Chair, PT/OT     # DVT ppx  - eliquis

## 2022-08-16 NOTE — PROGRESS NOTE ADULT - ASSESSMENT
82 year old F PMH HTN, DM, HLD, CAD, MI, PAD, peripheral neuropathy, former smoker presented to [facility] ED on 7/19/22 with brain mass s/p Right craniotomy/resection 7/27/22. 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, primary lung  #ADL impairement  - MR brain 7/20/22 with Right temporal lobe peripherally enhancing, centrally necrotic 3.1 x 2.6 cm mass with surrounding vasogenic edema with METS  - s/p right craniotomy for resection of brain mass 7/27/22  - Bronchial biopsy consistent with poorly differentiated adenocarcinoma 7/20/22  - continue with Brivaracetam 50mg BID and decadron taper  - continue depakote 500mg q8hrs for seizure ppx  - will need outpatient PET/CT scan, radiation therapy and further evaluation with heme/onc    #PERCY  - Resolved  - encourage PO fluid intake    #orthostatic  - orthostatic in PT, possibly from deconditioning and multiple BP meds  - Off antihypertensives losartan and amlodipine   - encourage PO intake  - continue to monitor     #leukocytosis  - Resolved    #CAD  #STEMI  #new onset afib  #HTN  #HLD  - echo 8/8/22 with EF 60-65%, basal inferolateral wall, the basal inferior wall, the basal anterolateral wall, and the mid inferior wall are hypokinetic  - not candidate for PCI given risks > benefit as per cath team due to recent craniotomy   - continue asa 81mg and lopressor 25mg BID, atorvastatin 40mg daily   - continue eliquis 5mg BID  - follow up EKG  - Off antihypertensives losartan and amlodipine     #DM type 2  - HbA1C 8.7 on 7/20  - continue lantus 18U and admelog 7U TID qAC    #history of PE  - found to have RML segmental PE on 8/3  - continue Eliquis 5mg BID    #anemia  - H/H stable  - anemia of chronic disease due to malignancy as per heme    #DVT ppx  - on Eliquis

## 2022-08-16 NOTE — PROGRESS NOTE ADULT - SUBJECTIVE AND OBJECTIVE BOX
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 No acute events overnight.  Pt denies cp, palpitations, sob, abd pain, N/V, fever, chills.    ROS:  All other review of systems negative    Allergies    adhesives (Pruritus)  No Known Drug Allergies    Intolerances        MEDICATIONS  (STANDING):  artificial  tears Solution 1 Drop(s) Both EYES three times a day  atorvastatin 40 milliGRAM(s) Oral at bedtime  brivaracetam 50 milliGRAM(s) Oral every 12 hours  dexAMETHasone     Tablet   Oral   dexAMETHasone     Tablet 2 milliGRAM(s) Oral daily  dextrose 5%. 1000 milliLiter(s) (100 mL/Hr) IV Continuous <Continuous>  dextrose 5%. 1000 milliLiter(s) (50 mL/Hr) IV Continuous <Continuous>  dextrose 50% Injectable 25 Gram(s) IV Push once  dextrose 50% Injectable 12.5 Gram(s) IV Push once  dextrose 50% Injectable 25 Gram(s) IV Push once  diVALproex  milliGRAM(s) Oral every 8 hours  enoxaparin Injectable 40 milliGRAM(s) SubCutaneous <User Schedule>  gabapentin 200 milliGRAM(s) Oral at bedtime  glucagon  Injectable 1 milliGRAM(s) IntraMuscular once  insulin glargine Injectable (LANTUS) 18 Unit(s) SubCutaneous at bedtime  insulin lispro (ADMELOG) corrective regimen sliding scale   SubCutaneous three times a day before meals  insulin lispro (ADMELOG) corrective regimen sliding scale   SubCutaneous at bedtime  insulin lispro Injectable (ADMELOG) 7 Unit(s) SubCutaneous three times a day before meals  melatonin 6 milliGRAM(s) Oral at bedtime  metoprolol tartrate 25 milliGRAM(s) Oral two times a day  pantoprazole    Tablet 40 milliGRAM(s) Oral before breakfast  polyethylene glycol 3350 17 Gram(s) Oral daily  senna 2 Tablet(s) Oral at bedtime  zinc oxide 40% Paste 1 Application(s) Topical two times a day    MEDICATIONS  (PRN):  acetaminophen     Tablet .. 650 milliGRAM(s) Oral every 6 hours PRN Temp greater or equal to 38C (100.4F), Mild Pain (1 - 3)  bisacodyl Oral Tab/Cap - Peds 5 milliGRAM(s) Oral daily PRN Constipation  dextrose Oral Gel 15 Gram(s) Oral once PRN Blood Glucose LESS THAN 70 milliGRAM(s)/deciliter      Vital Signs Last 24 Hrs  T(C): 36.4 (16 Aug 2022 08:24), Max: 36.6 (15 Aug 2022 20:15)  T(F): 97.6 (16 Aug 2022 08:24), Max: 97.9 (15 Aug 2022 20:15)  HR: 76 (16 Aug 2022 08:24) (68 - 78)  BP: 98/67 (16 Aug 2022 08:24) (97/61 - 122/86)  BP(mean): --  RR: 16 (16 Aug 2022 08:24) (16 - 16)  SpO2: 99% (16 Aug 2022 08:24) (98% - 99%)    Parameters below as of 16 Aug 2022 08:24  Patient On (Oxygen Delivery Method): room air      CAPILLARY BLOOD GLUCOSE      POCT Blood Glucose.: 174 mg/dL (16 Aug 2022 11:57)  POCT Blood Glucose.: 78 mg/dL (16 Aug 2022 08:21)  POCT Blood Glucose.: 149 mg/dL (15 Aug 2022 22:16)  POCT Blood Glucose.: 160 mg/dL (15 Aug 2022 17:10)    I&O's Summary      PHYSICAL EXAM:  GENERAL: NAD, well-developed female  HEAD:  Surgical incision c/d/i  EYES: EOMI, PERRLA, conjunctiva and sclera clear  NECK: Supple, No JVD  CHEST/LUNG: Clear to auscultation bilaterally; No wheeze, nonlabored breathing  HEART: Regular rate and rhythm; No murmurs, rubs, or gallops  ABDOMEN: Soft, Nontender, Nondistended; Bowel sounds present  EXTREMITIES:  2+ Peripheral Pulses, No clubbing, cyanosis, or edema  PSYCH: calm, appropriate mood  SKIN: + Forehead blister with fluid drained    LABS:    08-16    144  |  109<H>  |  57<H>  ----------------------------<  71  3.8   |  25  |  1.04    Ca    9.2      16 Aug 2022 06:38                RADIOLOGY & ADDITIONAL TESTS:  Results Reviewed:   Imaging Personally Reviewed:  Electrocardiogram Personally Reviewed:    COORDINATION OF CARE:  Care Discussed with Consultants/Other Providers [Y/N]:  Prior or Outpatient Records Reviewed [Y/N]:

## 2022-08-16 NOTE — PROGRESS NOTE ADULT - SUBJECTIVE AND OBJECTIVE BOX
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 She was found to have a brain mass on imaging while undergoing work up by her orthopedist (Dr. Castro).    MR brain 7/20/22 +Right temporal lobe peripherally enhancing, centrally necrotic 3.1 x 2.6 cm mass with surrounding vasogenic edema. 8 mm leftward midline shift. Right uncal herniation. Additional left parafalcine 1.3 x 0.9 cm rim-enhancing cystic lesion with mild surrounding vasogenic edema. Tiny 0.3 x 0.3cm rim-enhancing lesion within the left frontal cortex concerning for metastatic disease. CT chest showed an obstructing left upper lobe endobronchial lesion. 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 Patient underwent Right craniotomy for resection of brain mass 7/27/22, Pathology +NSC carcinoma (favor adeno). Plans for out-pt PET/CT scan, RT, and consideration for systemic therapy.     Post-op course significant for STEMI 8/1/22. Not a candidate for cath secondary to comorbidities and recent surgery. She was started on ASA/ heparin gtt and a betablocker. TTE 7/22 +normal LV internal dimensions with discrete upper septal hypertrophy. EF 55%, inferolateral wall is akinetic. She was found to have RML segmental PE 8./3. Hospital course also significant for hyperkalemia, and encephalopathy, likely secondary to UTI/early urosepsis (treated with 5 days of Zosyn). EEG +Right temporal sharp waves, no seizures; started on Briviact 8/3/22. She developed new onset Afib w RVR on 8/4/22. with elevated troponin; metoprolol was increased, and heparin anticoagulation switched to Eliquis on 8/9/22.    Patient was evaluated by PM&R and therapy for functional deficits, gait/ADL impairments and acute rehabilitation was recommended. Patient was medically optimized for discharge to [facility] IRF 8/10/22.     ROS/Subjective: NAD this am, denies any HA, vision changes, nor new weakness, denies chest pain/sob. No seizures overnight. Reports urinary freq overnight but resolved now. Impaired sleep. Tolerating PO. No NV, diarrhea resolved. to therapy.      PAST MEDICAL & SURGICAL HISTORY:  Hypertension      Coronary artery disease      Hyperlipidemia      Peripheral artery disease      Myocardial infarct      Peripheral neuropathy      Diabetes      Chronic back pain      Former smoker      PAD (peripheral artery disease)      CAD (coronary artery disease)  stents      VITALS  Vital Signs Last 24 Hrs  T(C): 36.4 (16 Aug 2022 08:24), Max: 36.6 (15 Aug 2022 20:15)  T(F): 97.6 (16 Aug 2022 08:24), Max: 97.9 (15 Aug 2022 20:15)  HR: 76 (16 Aug 2022 08:24) (68 - 78)  BP: 98/67 (16 Aug 2022 08:24) (97/61 - 122/86)  BP(mean): --  RR: 16 (16 Aug 2022 08:24) (16 - 16)  SpO2: 99% (16 Aug 2022 08:24) (98% - 99%)    Parameters below as of 16 Aug 2022 08:24  Patient On (Oxygen Delivery Method): room air       RECENT LABS    08-16    144  |  109<H>  |  57<H>  ----------------------------<  71  3.8   |  25  |  1.04    Ca    9.2      16 Aug 2022 06:38                Valproic Acid Level, Serum: 102 ug/mL (08-03-22 @ 05:52)      CURRENT MEDICATIONS  MEDICATIONS  (STANDING):  artificial  tears Solution 1 Drop(s) Both EYES three times a day  atorvastatin 40 milliGRAM(s) Oral at bedtime  brivaracetam 50 milliGRAM(s) Oral every 12 hours  dexAMETHasone     Tablet   Oral   dexAMETHasone     Tablet 2 milliGRAM(s) Oral daily  dextrose 5%. 1000 milliLiter(s) (100 mL/Hr) IV Continuous <Continuous>  dextrose 5%. 1000 milliLiter(s) (50 mL/Hr) IV Continuous <Continuous>  dextrose 50% Injectable 25 Gram(s) IV Push once  dextrose 50% Injectable 12.5 Gram(s) IV Push once  dextrose 50% Injectable 25 Gram(s) IV Push once  diVALproex  milliGRAM(s) Oral every 8 hours  enoxaparin Injectable 40 milliGRAM(s) SubCutaneous <User Schedule>  gabapentin 200 milliGRAM(s) Oral at bedtime  glucagon  Injectable 1 milliGRAM(s) IntraMuscular once  insulin glargine Injectable (LANTUS) 18 Unit(s) SubCutaneous at bedtime  insulin lispro (ADMELOG) corrective regimen sliding scale   SubCutaneous three times a day before meals  insulin lispro (ADMELOG) corrective regimen sliding scale   SubCutaneous at bedtime  insulin lispro Injectable (ADMELOG) 7 Unit(s) SubCutaneous three times a day before meals  melatonin 6 milliGRAM(s) Oral at bedtime  metoprolol tartrate 25 milliGRAM(s) Oral two times a day  pantoprazole    Tablet 40 milliGRAM(s) Oral before breakfast  polyethylene glycol 3350 17 Gram(s) Oral daily  senna 2 Tablet(s) Oral at bedtime  zinc oxide 40% Paste 1 Application(s) Topical two times a day    MEDICATIONS  (PRN):  acetaminophen     Tablet .. 650 milliGRAM(s) Oral every 6 hours PRN Temp greater or equal to 38C (100.4F), Mild Pain (1 - 3)  bisacodyl Oral Tab/Cap - Peds 5 milliGRAM(s) Oral daily PRN Constipation  dextrose Oral Gel 15 Gram(s) Oral once PRN Blood Glucose LESS THAN 70 milliGRAM(s)/deciliter      Review of Systems:   · Additional ROS	NAD this am, eating breakfast,     Physical Exam:   · Constitutional Comments	alert O x 2-3   conjunctiva clear. no discharge/weeping  appears tired/didn't sleep well    right cranial incision healing well, C/D/I   · Respiratory	clear to auscultation bilaterally; no wheezes; no rales; no rhonchi; good- effort  · Cardiovascular	regular rate and rhythm; S1 S2 present; no gallops; no rub; no murmur  · Gastrointestinal	soft; nontender; nondistended; normal active bowel sounds  · Musculoskeletal	no calf tenderness no erythema or warmth  · Musculoskeletal Comments	bilateral HF 4/5 quad 4+/5 ankle PF and DF 5/5 normal tone    Continue comprehensive acute rehab program consisting of 3hrs/day of OT/PT and SLP.

## 2022-08-17 LAB
APPEARANCE UR: ABNORMAL
BACTERIA # UR AUTO: ABNORMAL /HPF
BILIRUB UR-MCNC: NEGATIVE — SIGNIFICANT CHANGE UP
COLOR SPEC: YELLOW — SIGNIFICANT CHANGE UP
DIFF PNL FLD: ABNORMAL
EPI CELLS # UR: SIGNIFICANT CHANGE UP
GLUCOSE BLDC GLUCOMTR-MCNC: 142 MG/DL — HIGH (ref 70–99)
GLUCOSE BLDC GLUCOMTR-MCNC: 143 MG/DL — HIGH (ref 70–99)
GLUCOSE BLDC GLUCOMTR-MCNC: 67 MG/DL — LOW (ref 70–99)
GLUCOSE BLDC GLUCOMTR-MCNC: 77 MG/DL — SIGNIFICANT CHANGE UP (ref 70–99)
GLUCOSE BLDC GLUCOMTR-MCNC: 98 MG/DL — SIGNIFICANT CHANGE UP (ref 70–99)
GLUCOSE UR QL: NEGATIVE — SIGNIFICANT CHANGE UP
KETONES UR-MCNC: ABNORMAL
LEUKOCYTE ESTERASE UR-ACNC: ABNORMAL
NITRITE UR-MCNC: NEGATIVE — SIGNIFICANT CHANGE UP
PH UR: 5 — SIGNIFICANT CHANGE UP (ref 5–8)
PROT UR-MCNC: 15
RBC CASTS # UR COMP ASSIST: NEGATIVE /HPF — SIGNIFICANT CHANGE UP (ref 0–4)
SP GR SPEC: 1.01 — SIGNIFICANT CHANGE UP (ref 1.01–1.02)
UROBILINOGEN FLD QL: NEGATIVE — SIGNIFICANT CHANGE UP
WBC UR QL: >50 /HPF (ref 0–5)

## 2022-08-17 PROCEDURE — 99223 1ST HOSP IP/OBS HIGH 75: CPT

## 2022-08-17 PROCEDURE — 99232 SBSQ HOSP IP/OBS MODERATE 35: CPT

## 2022-08-17 RX ORDER — CIPROFLOXACIN LACTATE 400MG/40ML
500 VIAL (ML) INTRAVENOUS EVERY 12 HOURS
Refills: 0 | Status: COMPLETED | OUTPATIENT
Start: 2022-08-17 | End: 2022-08-20

## 2022-08-17 RX ORDER — NITROFURANTOIN MACROCRYSTAL 50 MG
100 CAPSULE ORAL
Refills: 0 | Status: DISCONTINUED | OUTPATIENT
Start: 2022-08-17 | End: 2022-08-17

## 2022-08-17 RX ORDER — INSULIN GLARGINE 100 [IU]/ML
12 INJECTION, SOLUTION SUBCUTANEOUS AT BEDTIME
Refills: 0 | Status: DISCONTINUED | OUTPATIENT
Start: 2022-08-17 | End: 2022-08-26

## 2022-08-17 RX ORDER — SODIUM CHLORIDE 9 MG/ML
1000 INJECTION INTRAMUSCULAR; INTRAVENOUS; SUBCUTANEOUS
Refills: 0 | Status: DISCONTINUED | OUTPATIENT
Start: 2022-08-17 | End: 2022-08-18

## 2022-08-17 RX ADMIN — ZINC OXIDE 1 APPLICATION(S): 200 OINTMENT TOPICAL at 17:43

## 2022-08-17 RX ADMIN — DIVALPROEX SODIUM 500 MILLIGRAM(S): 500 TABLET, DELAYED RELEASE ORAL at 05:26

## 2022-08-17 RX ADMIN — Medication 25 MILLIGRAM(S): at 05:26

## 2022-08-17 RX ADMIN — ZINC OXIDE 1 APPLICATION(S): 200 OINTMENT TOPICAL at 05:26

## 2022-08-17 RX ADMIN — INSULIN GLARGINE 12 UNIT(S): 100 INJECTION, SOLUTION SUBCUTANEOUS at 22:08

## 2022-08-17 RX ADMIN — Medication 2 MILLIGRAM(S): at 05:26

## 2022-08-17 RX ADMIN — Medication 7 UNIT(S): at 17:38

## 2022-08-17 RX ADMIN — SODIUM CHLORIDE 60 MILLILITER(S): 9 INJECTION INTRAMUSCULAR; INTRAVENOUS; SUBCUTANEOUS at 12:10

## 2022-08-17 RX ADMIN — Medication 1 DROP(S): at 13:23

## 2022-08-17 RX ADMIN — BRIVARACETAM 50 MILLIGRAM(S): 25 TABLET, FILM COATED ORAL at 05:27

## 2022-08-17 RX ADMIN — SENNA PLUS 2 TABLET(S): 8.6 TABLET ORAL at 22:09

## 2022-08-17 RX ADMIN — BRIVARACETAM 50 MILLIGRAM(S): 25 TABLET, FILM COATED ORAL at 17:38

## 2022-08-17 RX ADMIN — ATORVASTATIN CALCIUM 40 MILLIGRAM(S): 80 TABLET, FILM COATED ORAL at 22:09

## 2022-08-17 RX ADMIN — DIVALPROEX SODIUM 500 MILLIGRAM(S): 500 TABLET, DELAYED RELEASE ORAL at 22:09

## 2022-08-17 RX ADMIN — GABAPENTIN 200 MILLIGRAM(S): 400 CAPSULE ORAL at 22:09

## 2022-08-17 RX ADMIN — PANTOPRAZOLE SODIUM 40 MILLIGRAM(S): 20 TABLET, DELAYED RELEASE ORAL at 05:26

## 2022-08-17 RX ADMIN — Medication 25 MILLIGRAM(S): at 17:41

## 2022-08-17 RX ADMIN — Medication 6 MILLIGRAM(S): at 22:09

## 2022-08-17 RX ADMIN — Medication 500 MILLIGRAM(S): at 17:38

## 2022-08-17 RX ADMIN — DIVALPROEX SODIUM 500 MILLIGRAM(S): 500 TABLET, DELAYED RELEASE ORAL at 13:22

## 2022-08-17 RX ADMIN — Medication 1 DROP(S): at 05:27

## 2022-08-17 RX ADMIN — ENOXAPARIN SODIUM 40 MILLIGRAM(S): 100 INJECTION SUBCUTANEOUS at 17:40

## 2022-08-17 RX ADMIN — Medication 1 DROP(S): at 22:08

## 2022-08-17 RX ADMIN — Medication 7 UNIT(S): at 12:08

## 2022-08-17 NOTE — PROGRESS NOTE ADULT - SUBJECTIVE AND OBJECTIVE BOX
Patient is a 82y old  Female who presents with a chief complaint of meetastatic adenoCA to brain s/p resection 7/27/22 (17 Aug 2022 09:37)      SUBJECTIVE / OVERNIGHT EVENTS:  Pt seen and examined at bedside. No acute events overnight. C/o dysuria  Pt denies cp, palpitations, sob, abd pain, N/V, fever, chills.    ROS:  All other review of systems negative    Allergies    adhesives (Pruritus)  No Known Drug Allergies    Intolerances        MEDICATIONS  (STANDING):  artificial  tears Solution 1 Drop(s) Both EYES three times a day  atorvastatin 40 milliGRAM(s) Oral at bedtime  brivaracetam 50 milliGRAM(s) Oral every 12 hours  dextrose 5%. 1000 milliLiter(s) (100 mL/Hr) IV Continuous <Continuous>  dextrose 5%. 1000 milliLiter(s) (50 mL/Hr) IV Continuous <Continuous>  dextrose 50% Injectable 25 Gram(s) IV Push once  dextrose 50% Injectable 12.5 Gram(s) IV Push once  dextrose 50% Injectable 25 Gram(s) IV Push once  diVALproex  milliGRAM(s) Oral every 8 hours  enoxaparin Injectable 40 milliGRAM(s) SubCutaneous <User Schedule>  gabapentin 200 milliGRAM(s) Oral at bedtime  glucagon  Injectable 1 milliGRAM(s) IntraMuscular once  insulin glargine Injectable (LANTUS) 18 Unit(s) SubCutaneous at bedtime  insulin lispro (ADMELOG) corrective regimen sliding scale   SubCutaneous three times a day before meals  insulin lispro (ADMELOG) corrective regimen sliding scale   SubCutaneous at bedtime  insulin lispro Injectable (ADMELOG) 7 Unit(s) SubCutaneous three times a day before meals  melatonin 6 milliGRAM(s) Oral at bedtime  metoprolol tartrate 25 milliGRAM(s) Oral two times a day  pantoprazole    Tablet 40 milliGRAM(s) Oral before breakfast  polyethylene glycol 3350 17 Gram(s) Oral daily  senna 2 Tablet(s) Oral at bedtime  sodium chloride 0.9%. 1000 milliLiter(s) (60 mL/Hr) IV Continuous <Continuous>  zinc oxide 40% Paste 1 Application(s) Topical two times a day    MEDICATIONS  (PRN):  acetaminophen     Tablet .. 650 milliGRAM(s) Oral every 6 hours PRN Temp greater or equal to 38C (100.4F), Mild Pain (1 - 3)  bisacodyl Oral Tab/Cap - Peds 5 milliGRAM(s) Oral daily PRN Constipation  dextrose Oral Gel 15 Gram(s) Oral once PRN Blood Glucose LESS THAN 70 milliGRAM(s)/deciliter      Vital Signs Last 24 Hrs  T(C): 36.4 (17 Aug 2022 09:41), Max: 36.4 (16 Aug 2022 19:20)  T(F): 97.5 (17 Aug 2022 09:41), Max: 97.6 (16 Aug 2022 19:20)  HR: 68 (17 Aug 2022 09:41) (68 - 90)  BP: 128/72 (17 Aug 2022 09:41) (106/76 - 128/72)  BP(mean): --  RR: 16 (17 Aug 2022 09:41) (16 - 16)  SpO2: 96% (17 Aug 2022 09:41) (96% - 97%)    Parameters below as of 17 Aug 2022 09:41  Patient On (Oxygen Delivery Method): room air      CAPILLARY BLOOD GLUCOSE      POCT Blood Glucose.: 143 mg/dL (17 Aug 2022 12:07)  POCT Blood Glucose.: 77 mg/dL (17 Aug 2022 07:49)  POCT Blood Glucose.: 67 mg/dL (17 Aug 2022 07:45)  POCT Blood Glucose.: 72 mg/dL (16 Aug 2022 22:18)  POCT Blood Glucose.: 85 mg/dL (16 Aug 2022 17:02)    I&O's Summary      PHYSICAL EXAM:  GENERAL: NAD, well-developed female  HEAD:  Surgical incision c/d/i  EYES: EOMI, PERRLA, conjunctiva and sclera clear  NECK: Supple, No JVD  CHEST/LUNG: Clear to auscultation bilaterally; No wheeze, nonlabored breathing  HEART: Regular rate and rhythm; No murmurs, rubs, or gallops  ABDOMEN: Soft, Nontender, Nondistended; Bowel sounds present  EXTREMITIES:  2+ Peripheral Pulses, No clubbing, cyanosis, or edema  PSYCH: calm, appropriate mood  SKIN: + Forehead blister with fluid drained    LABS:    08-16    144  |  109<H>  |  57<H>  ----------------------------<  71  3.8   |  25  |  1.04    Ca    9.2      16 Aug 2022 06:38                RADIOLOGY & ADDITIONAL TESTS:  Results Reviewed:   Imaging Personally Reviewed:  Electrocardiogram Personally Reviewed:    COORDINATION OF CARE:  Care Discussed with Consultants/Other Providers [Y/N]:  Prior or Outpatient Records Reviewed [Y/N]:

## 2022-08-17 NOTE — CONSULT NOTE ADULT - ASSESSMENT
82 year old F PMH HTN, DM, HLD, CAD, MI, PAD, peripheral neuropathy, former smoker presented to [facility] ED on 7/19/22 with brain mass s/p Right craniotomy/resection 7/27/22. 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, primary lung  #ADL impairement  - MR brain 7/20/22 with Right temporal lobe peripherally enhancing, centrally necrotic 3.1 x 2.6 cm mass with surrounding vasogenic edema with METS  - s/p right craniotomy for resection of brain mass 7/27/22  - Bronchial biopsy consistent with poorly differentiated adenocarcinoma 7/20/22  - continue with Brivaracetam 50mg BID and decadron taper  - continue depakote 500mg q8hrs for seizure ppx  - start comprehensive rehab program  - will need outpatient PET/CT scan, radiation therapy and further evaluation with heme/onc    #PERCY  - possibly due to dehydration vs hypoperfusion due to low BPs  - discontinue losartan  - encourage PO fluid intake    #orthostatic  - orthostatic in PT, possibly from deconditioning and multiple BP meds  - discontinued losartan and amlodipine   - encourage PO intake  - continue to monitor     #leukocytosis  - likely in setting of recent steroid use  - afebrile, monitor closely and off abx for now    #CAD  #STEMI  #new onset afib  #HTN  #HLD  - echo 8/8/22 with EF 60-65%, basal inferolateral wall, the basal inferior wall, the basal anterolateral wall, and the mid inferior wall are hypokinetic  - not candidate for PCI given risks > benefit as per cath team due to recent craniotomy   - continue asa 81mg and lopressor 25mg BID, atorvastatin 40mg daily   - continue eliquis 5mg BID  - follow up EKG  - will discontinue amlodipine and losartan for now, monitor BP    #DM type 2  - HbA1C 8.7 on 7/20  - continue lantus 15U qhs and admleog 5U TID     #history of PE  - found to have RML segmental PE on 8/3  - continue Eliquis 5mg BID    #anemia  - H/H stable  - anemia of chronic disease due to malignancy as per heme    #DVT ppx  - on Eliquis
This is an 82 year old woman with a history of HTN, DM, HLD, CAD, MI, PAD, peripheral neuropathy, former heavy smoker though she quit 14 years ago.  Patient presented to [facility] ED on 7/19/22 with chronic back pain and gait imbalance for 4 months. Patient was found to have a brain mass on imaging while under orthopedic evaluation with Dr. Castro.  Brain MRI at [facility] demonstrated a 7/20/22 Right temporal lobe enhancing 3.1X2.6cm mass with vasogenic edema, left midline shift, right uncal herniation and left parafalcine 1.3X0.9cm mass, and a third lesion 3mm in the left frontal cortex. Patient also found to have a left upper lobe endobronchial lesion.  Biopsy 7/20/22 demonstrated a poorly differentiated adenocarcinoma.  Brain mas was resected by right craniotomy 7/27/22.  Non small cell cancer, favor adenocarcinoma of the lung.  Patient was seen by Dr. Yaron Tse once at [facility], however patient expresses wishes to transfer to the [facility] system.  Hospital course was complicated post  craniotomy by STEMI 8/1, RML segmental PE, Hyperkalemia, encephalopathy, UTI.  Patient on Brivaracetam gabapentin and depakote for seizure prophylaxis.  Now transferred to  acute rehab since 8/4/22.      Currently the most important task is to complete the rehabilitation training to gain functional status to begin palliative treatment.    Following discharge patient will need appointments with radiation oncology for palliative radiation therapy for the CNS metastasis.    Will also need follow up with neurosurgery to follow up on the craniotomy incisions  I can have patient set up with a lung oncologist at [facility] upon discharge to discuss palliative chemotherapy and immunotherapy.        Patient remains on decadron taper, that is ending in a few days, can discontinue soon after what appears to be 2 more days.      Given recent CNS surgery, PE can not be fully treated on full dose anticoagulation now on 40mg lovenox SQ daily for prophylaxis. Recommend increase to full dose of lovenox 1mg/kg BID once surgically feasible.      Attempted to call son and daughter to discuss further given patient has poor memory right now. No answers left VM for daughter Suzan

## 2022-08-17 NOTE — CONSULT NOTE ADULT - REASON FOR ADMISSION
metastatic adenocarcinoma to brain s/p resection 7/27/22
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

## 2022-08-17 NOTE — CONSULT NOTE ADULT - SUBJECTIVE AND OBJECTIVE BOX
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 She was found to have a brain mass on imaging while undergoing work up by her orthopedist (Dr. Castro).    MR brain 7/20/22 +Right temporal lobe peripherally enhancing, centrally necrotic 3.1 x 2.6 cm mass with surrounding vasogenic edema. 8 mm leftward midline shift. Right uncal herniation. Additional left parafalcine 1.3 x 0.9 cm rim-enhancing cystic lesion with mild surrounding vasogenic edema. Tiny 0.3 x 0.3cm rim-enhancing lesion within the left frontal cortex concerning for metastatic disease. CT chest showed an obstructing left upper lobe endobronchial lesion. 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 Patient underwent Right craniotomy for resection of brain mass 7/27/22, Pathology +NSC carcinoma (favor adeno). Plans for out-pt PET/CT scan, RT, and consideration for systemic therapy.     Post-op course significant for STEMI 8/1/22. Not a candidate for cath secondary to comorbidities and recent surgery. She was started on ASA/ heparin gtt and a betablocker. TTE 7/22 +normal LV internal dimensions with discrete upper septal hypertrophy. EF 55%, inferolateral wall is akinetic. She was found to have RML segmental PE 8./3. Hospital course also significant for hyperkalemia, and encephalopathy, likely secondary to UTI/early urosepsis (treated with 5 days of Zosyn). EEG +Right temporal sharp waves, no seizures; started on Briviact 8/3/22. She developed new onset Afib w RVR on 8/4/22. with elevated troponin; metoprolol was increased, and heparin anticoagulation switched to Eliquis on 8/9/22.    Patient was evaluated by PM&R and therapy for functional deficits, gait/ADL impairments and acute rehabilitation was recommended. Patient was medically optimized for discharge to [facility] IRF 8/10/22. (10 Aug 2022 13:45)      Subjective History:  Patient seen and examined at bedside, sitting in chair. She denies any complaints at this time, and is looking forward to PT as she was hospitalized for almost a month and was independent prior. She denies headache, changes in vision, chest pain or palpitations.    PAST MEDICAL & SURGICAL HISTORY:  Hypertension  Coronary artery disease  Hyperlipidemia  Peripheral artery disease  Myocardial infarct  Peripheral neuropathy  Diabetes  Chronic back pain  Former smoker  PAD (peripheral artery disease)  CAD (coronary artery disease) stents    SOCIAL HISTORY:  Lives at home alone  Ambulates and performs ADLs independently  Former smoker, quit about 14.5 years ago  Denies illicit drug use, EtOH    FAMILY HISTORY:  family history of stoke in sibling    ALLERGIES:  adhesives (Pruritus)  No Known Drug Allergies    MEDICATIONS  (STANDING):  amLODIPine   Tablet 5 milliGRAM(s) Oral daily  atorvastatin 40 milliGRAM(s) Oral at bedtime  dexAMETHasone     Tablet 2 milliGRAM(s) Oral three times a day  dexAMETHasone     Tablet   Oral   dextrose 5%. 1000 milliLiter(s) (100 mL/Hr) IV Continuous <Continuous>  dextrose 5%. 1000 milliLiter(s) (50 mL/Hr) IV Continuous <Continuous>  dextrose 50% Injectable 25 Gram(s) IV Push once  dextrose 50% Injectable 12.5 Gram(s) IV Push once  dextrose 50% Injectable 25 Gram(s) IV Push once  diVALproex  milliGRAM(s) Oral every 8 hours  enoxaparin Injectable 40 milliGRAM(s) SubCutaneous <User Schedule>  gabapentin 200 milliGRAM(s) Oral at bedtime  glucagon  Injectable 1 milliGRAM(s) IntraMuscular once  insulin glargine Injectable (LANTUS) 15 Unit(s) SubCutaneous at bedtime  insulin lispro (ADMELOG) corrective regimen sliding scale   SubCutaneous three times a day before meals  insulin lispro (ADMELOG) corrective regimen sliding scale   SubCutaneous at bedtime  insulin lispro Injectable (ADMELOG) 7 Unit(s) SubCutaneous before breakfast  insulin lispro Injectable (ADMELOG) 5 Unit(s) SubCutaneous before lunch  insulin lispro Injectable (ADMELOG) 7 Unit(s) SubCutaneous before dinner  losartan 100 milliGRAM(s) Oral daily  melatonin 6 milliGRAM(s) Oral at bedtime  metoprolol tartrate 25 milliGRAM(s) Oral two times a day  pantoprazole    Tablet 40 milliGRAM(s) Oral before breakfast  polyethylene glycol 3350 17 Gram(s) Oral two times a day  senna 2 Tablet(s) Oral at bedtime    MEDICATIONS  (PRN):  acetaminophen     Tablet .. 650 milliGRAM(s) Oral every 6 hours PRN Temp greater or equal to 38C (100.4F), Mild Pain (1 - 3)  bisacodyl Oral Tab/Cap - Peds 5 milliGRAM(s) Oral daily PRN Constipation  dextrose Oral Gel 15 Gram(s) Oral once PRN Blood Glucose LESS THAN 70 milliGRAM(s)/deciliter    Review of Systems:   CONSTITUTIONAL: denies fever, chills, fatigue, weakness  HEENT: denies blurred vision, sore throat  CARDIOVASCULAR: denies chest pain, chest pressure, palpitations  RESPIRATORY: denies shortness of breath, sputum production  GASTROINTESTINAL: denies nausea, vomiting, diarrhea, abdominal pain  GENITOURINARY: denies dysuria, discharge  NEUROLOGICAL: denies numbness, headache, focal weakness  MUSCULOSKELETAL: denies new joint pain, muscle aches  LYMPHATICS: denies enlarged lymph nodes, extremity swelling  PSYCHIATRIC: denies recent changes in anxiety, depression  ENDOCRINOLOGIC: denies sweating, cold or heat intolerance    Vital Signs Last 24 Hrs  T(F): 97.4 (11 Aug 2022 05:21), Max: 98.4 (10 Aug 2022 12:47)  HR: 70 (11 Aug 2022 05:21) (64 - 84)  BP: 118/74 (11 Aug 2022 05:21) (99/57 - 118/74)  RR: 16 (11 Aug 2022 05:21) (16 - 18)  SpO2: 96% (11 Aug 2022 05:21) (96% - 98%)  I&O's Summary    PHYSICAL EXAM:  GENERAL: NAD, sitting in chair  HEAD:  Normocephalic, incision site on R parietal region c/d/i, forehead pressure injury blister  EYES: PERRL, conjunctiva and sclera clear  ENMT: Moist mucous membranes, Good dentition  NECK: Supple, No JVD  CHEST/LUNG: Clear to auscultation bilaterally, non-labored breathing, good air entry  HEART: RRR; S1/S2, No murmur  ABDOMEN: Soft, Nontender, Nondistended; Bowel sounds present  VASCULAR: Normal pulses, Normal capillary refill  EXTREMITIES: No cyanosis, No edema  SKIN: Warm, perfused  PSYCH: Normal mood and affect  NERVOUS SYSTEM:  good concentration, 5/5 strength in all extremities     LABS:                        11.7   11.78 )-----------( 345      ( 11 Aug 2022 06:30 )             35.7     08-11    140  |  104  |  53  ----------------------------<  222  4.6   |  24  |  1.36    Ca    9.2      11 Aug 2022 06:30    TPro  6.2  /  Alb  2.3  /  TBili  0.2  /  DBili  x   /  AST  15  /  ALT  28  /  AlkPhos  73  08-11      PTT - ( 09 Aug 2022 06:20 )  PTT:65.9 sec    CARDIAC MARKERS ( 08 Aug 2022 20:19 )  x     / x     / 40 U/L / x     / x                      POCT Blood Glucose.: 212 mg/dL (11 Aug 2022 08:05)  POCT Blood Glucose.: 144 mg/dL (10 Aug 2022 22:36)  POCT Blood Glucose.: 147 mg/dL (10 Aug 2022 17:10)  POCT Blood Glucose.: 164 mg/dL (10 Aug 2022 16:33)  POCT Blood Glucose.: 136 mg/dL (10 Aug 2022 11:16)        Serum Pro-Brain Natriuretic Peptide: 4009 pg/mL (08-04-22 @ 21:53)  Serum Pro-Brain Natriuretic Peptide: 7071 pg/mL (08-04-22 @ 04:20)        COVID-19 PCR: NotDetec (08-10-22 @ 17:00)  COVID-19 PCR: NotDetec (08-10-22 @ 07:27)  COVID-19 PCR: NotDetec (08-06-22 @ 06:46)  COVID-19 PCR: NotDetec (07-31-22 @ 07:45)  COVID-19 PCR: NotDetec (07-24-22 @ 06:24)    RADIOLOGY & ADDITIONAL TESTS: reviewed labs    Care Discussed with Consultants/Other Providers: discussed with Dr. Saucedo
This is an 82 year old woman with a history of HTN, DM, HLD, CAD, MI, PAD, peripheral neuropathy, former heavy smoker though she quit 14 years ago.  Patient presented to [facility] ED on 7/19/22 with chronic back pain and gait imbalance for 4 months. Patient was found to have a brain mass on imaging while under orthopedic evaluation with Dr. Castro.  Brain MRI at [facility] demonstrated a 7/20/22 Right temporal lobe enhancing 3.1X2.6cm mass with vasogenic edema, left midline shift, right uncal herniation and left parafalcine 1.3X0.9cm mass, and a third lesion 3mm in the left frontal cortex. Patient also found to have a left upper lobe endobronchial lesion.  Biopsy 7/20/22 demonstrated a poorly differentiated adenocarcinoma.  Brain mas was resected by right craniotomy 7/27/22.  Non small cell cancer, favor adenocarcinoma of the lung.  Patient was seen by Dr. Yaron Tse once at [facility], however patient expresses wishes to transfer to the [facility] system.  Hospital course was complicated post  craniotomy by STEMI 8/1, RML segmental PE, Hyperkalemia, encephalopathy, UTI.  Patient on Brivaracetam gabapentin and depakote for seizure prophylaxis.  Now transferred to  acute rehab since 8/4/22.    Called heriberto Jennings, RACHEL not set up   Called pio Connors with call back.     MEDICATIONS  (STANDING):  artificial  tears Solution 1 Drop(s) Both EYES three times a day  atorvastatin 40 milliGRAM(s) Oral at bedtime  brivaracetam 50 milliGRAM(s) Oral every 12 hours  ciprofloxacin     Tablet 500 milliGRAM(s) Oral every 12 hours  diVALproex  milliGRAM(s) Oral every 8 hours  enoxaparin Injectable 40 milliGRAM(s) SubCutaneous <User Schedule>  gabapentin 200 milliGRAM(s) Oral at bedtime  glucagon  Injectable 1 milliGRAM(s) IntraMuscular once  insulin glargine Injectable (LANTUS) 12 Unit(s) SubCutaneous at bedtime  insulin lispro (ADMELOG) corrective regimen sliding scale   SubCutaneous three times a day before meals  insulin lispro (ADMELOG) corrective regimen sliding scale   SubCutaneous at bedtime  insulin lispro Injectable (ADMELOG) 7 Unit(s) SubCutaneous three times a day before meals  melatonin 6 milliGRAM(s) Oral at bedtime  metoprolol tartrate 25 milliGRAM(s) Oral two times a day  pantoprazole    Tablet 40 milliGRAM(s) Oral before breakfast  polyethylene glycol 3350 17 Gram(s) Oral daily  senna 2 Tablet(s) Oral at bedtime  sodium chloride 0.9%. 1000 milliLiter(s) (60 mL/Hr) IV Continuous <Continuous>  zinc oxide 40% Paste 1 Application(s) Topical two times a day    MEDICATIONS  (PRN):  acetaminophen     Tablet .. 650 milliGRAM(s) Oral every 6 hours PRN Temp greater or equal to 38C (100.4F), Mild Pain (1 - 3)  bisacodyl Oral Tab/Cap - Peds 5 milliGRAM(s) Oral daily PRN Constipation  dextrose Oral Gel 15 Gram(s) Oral once PRN Blood Glucose LESS THAN 70 milliGRAM(s)/deciliter    8-16  144  |  109<H>  |  57<H>  ----------------------------<  71  3.8   |  25  |  1.04    Ca    9.2      16 Aug 2022 06:38

## 2022-08-17 NOTE — PROGRESS NOTE ADULT - ASSESSMENT
82 year old F PMH HTN, DM, HLD, CAD, MI, PAD, peripheral neuropathy, former smoker presented to [facility] ED on 7/19/22 with brain mass s/p Right craniotomy/resection 7/27/22. Hospital course complicated with STEMI and metabolic encephalopathy 2/2 UTI, new onset afib, now admitted to  rehab for ADL impairment    #Dysuria  -Follow up with UA. 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, primary lung  #ADL impairement  - MR brain 7/20/22 with Right temporal lobe peripherally enhancing, centrally necrotic 3.1 x 2.6 cm mass with surrounding vasogenic edema with METS  - s/p right craniotomy for resection of brain mass 7/27/22  - Bronchial biopsy consistent with poorly differentiated adenocarcinoma 7/20/22  - continue with Brivaracetam 50mg BID and decadron taper  - continue depakote 500mg q8hrs for seizure ppx  - will need outpatient PET/CT scan, radiation therapy and further evaluation with heme/onc    #PERCY  - Resolved  - encourage PO fluid intake    #orthostatic  - orthostatic in PT, possibly from deconditioning and multiple BP meds  - Off antihypertensives losartan and amlodipine   - encourage PO intake  - continue to monitor     #leukocytosis  - Resolved    #CAD  #STEMI  #new onset afib  #HTN  #HLD  - echo 8/8/22 with EF 60-65%, basal inferolateral wall, the basal inferior wall, the basal anterolateral wall, and the mid inferior wall are hypokinetic  - not candidate for PCI given risks > benefit as per cath team due to recent craniotomy   - continue asa 81mg and lopressor 25mg BID, atorvastatin 40mg daily   - continue eliquis 5mg BID  - follow up EKG  - Off antihypertensives losartan and amlodipine     #DM type 2  -Hypoglycemic episodes  - HbA1C 8.7  - Decrease Lantus to 12U and continue admelog 7U TID qAC    #history of PE  - found to have RML segmental PE on 8/3  - continue Eliquis 5mg BID    #anemia  - H/H stable  - anemia of chronic disease due to malignancy as per heme    #DVT ppx  - on Eliquis

## 2022-08-17 NOTE — PROGRESS NOTE ADULT - SUBJECTIVE AND OBJECTIVE BOX
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 ED on 22 with chronic back pain and gait/balance issues x 4 months. She was found to have a brain mass on imaging while undergoing work up by her orthopedist (Dr. Castro).    MR brain 22 +Right temporal lobe peripherally enhancing, centrally necrotic 3.1 x 2.6 cm mass with surrounding vasogenic edema. 8 mm leftward midline shift. Right uncal herniation. Additional left parafalcine 1.3 x 0.9 cm rim-enhancing cystic lesion with mild surrounding vasogenic edema. Tiny 0.3 x 0.3cm rim-enhancing lesion within the left frontal cortex concerning for metastatic disease. CT chest showed an obstructing left upper lobe endobronchial lesion. She had bronchial biopsy, consistent with poorly differentiated adenocarcinoma 22. Patient underwent Right craniotomy for resection of brain mass 22, Pathology +NSC carcinoma (favor adeno). Plans for out-pt PET/CT scan, RT, and consideration for systemic therapy.     Post-op course significant for STEMI 22. Not a candidate for cath secondary to comorbidities and recent surgery. She was started on ASA/ heparin gtt and a betablocker. TTE  +normal LV internal dimensions with discrete upper septal hypertrophy. EF 55%, inferolateral wall is akinetic. She was found to have RML segmental PE 8./3. Hospital course also significant for hyperkalemia, and encephalopathy, likely secondary to UTI/early urosepsis (treated with 5 days of Zosyn). EEG +Right temporal sharp waves, no seizures; started on Briviact 8/3/22. She developed new onset Afib w RVR on 22. with elevated troponin; metoprolol was increased, and heparin anticoagulation switched to Eliquis on 22.    Patient was evaluated by PM&R and therapy for functional deficits, gait/ADL impairments and acute rehabilitation was recommended. Patient was medically optimized for discharge to [facility] IRF 8/10/22. (10 Aug 2022 13:45)      PAST MEDICAL & SURGICAL HISTORY:  Hypertension      Coronary artery disease      Hyperlipidemia      Peripheral artery disease      Myocardial infarct      Peripheral neuropathy      Diabetes      Chronic back pain      Former smoker      PAD (peripheral artery disease)      CAD (coronary artery disease)  stents          MEDICATIONS  (STANDING):  artificial  tears Solution 1 Drop(s) Both EYES three times a day  atorvastatin 40 milliGRAM(s) Oral at bedtime  brivaracetam 50 milliGRAM(s) Oral every 12 hours  dextrose 5%. 1000 milliLiter(s) (100 mL/Hr) IV Continuous <Continuous>  dextrose 5%. 1000 milliLiter(s) (50 mL/Hr) IV Continuous <Continuous>  dextrose 50% Injectable 25 Gram(s) IV Push once  dextrose 50% Injectable 12.5 Gram(s) IV Push once  dextrose 50% Injectable 25 Gram(s) IV Push once  diVALproex  milliGRAM(s) Oral every 8 hours  enoxaparin Injectable 40 milliGRAM(s) SubCutaneous <User Schedule>  gabapentin 200 milliGRAM(s) Oral at bedtime  glucagon  Injectable 1 milliGRAM(s) IntraMuscular once  insulin glargine Injectable (LANTUS) 18 Unit(s) SubCutaneous at bedtime  insulin lispro (ADMELOG) corrective regimen sliding scale   SubCutaneous three times a day before meals  insulin lispro (ADMELOG) corrective regimen sliding scale   SubCutaneous at bedtime  insulin lispro Injectable (ADMELOG) 7 Unit(s) SubCutaneous three times a day before meals  melatonin 6 milliGRAM(s) Oral at bedtime  metoprolol tartrate 25 milliGRAM(s) Oral two times a day  pantoprazole    Tablet 40 milliGRAM(s) Oral before breakfast  polyethylene glycol 3350 17 Gram(s) Oral daily  senna 2 Tablet(s) Oral at bedtime  zinc oxide 40% Paste 1 Application(s) Topical two times a day    MEDICATIONS  (PRN):  acetaminophen     Tablet .. 650 milliGRAM(s) Oral every 6 hours PRN Temp greater or equal to 38C (100.4F), Mild Pain (1 - 3)  bisacodyl Oral Tab/Cap - Peds 5 milliGRAM(s) Oral daily PRN Constipation  dextrose Oral Gel 15 Gram(s) Oral once PRN Blood Glucose LESS THAN 70 milliGRAM(s)/deciliter      Allergies    adhesives (Pruritus)  No Known Drug Allergies    Intolerances          VITALS  82y  Vital Signs Last 24 Hrs  T(C): 36.4 (16 Aug 2022 19:20), Max: 36.4 (16 Aug 2022 19:20)  T(F): 97.6 (16 Aug 2022 19:20), Max: 97.6 (16 Aug 2022 19:20)  HR: 74 (17 Aug 2022 05:21) (74 - 90)  BP: 106/76 (17 Aug 2022 05:21) (106/76 - 116/86)  BP(mean): --  RR: 16 (16 Aug 2022 19:20) (16 - 16)  SpO2: 97% (16 Aug 2022 19:20) (97% - 97%)    Parameters below as of 16 Aug 2022 19:20  Patient On (Oxygen Delivery Method): room air      Daily     Daily Weight in k.5 (17 Aug 2022 04:11)        RECENT LABS:          144  |  109<H>  |  57<H>  ----------------------------<  71  3.8   |  25  |  1.04    Ca    9.2      16 Aug 2022 06:38                CAPILLARY BLOOD GLUCOSE      POCT Blood Glucose.: 77 mg/dL (17 Aug 2022 07:49)  POCT Blood Glucose.: 67 mg/dL (17 Aug 2022 07:45)  POCT Blood Glucose.: 72 mg/dL (16 Aug 2022 22:18)  POCT Blood Glucose.: 85 mg/dL (16 Aug 2022 17:02)  POCT Blood Glucose.: 174 mg/dL (16 Aug 2022 11:57)               Patient is a 82y old  Female who presents with a chief complaint of 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 She was found to have a brain mass on imaging while undergoing work up by her orthopedist (Dr. Catsro).    MR brain 22 +Right temporal lobe peripherally enhancing, centrally necrotic 3.1 x 2.6 cm mass with surrounding vasogenic edema. 8 mm leftward midline shift. Right uncal herniation. Additional left parafalcine 1.3 x 0.9 cm rim-enhancing cystic lesion with mild surrounding vasogenic edema. Tiny 0.3 x 0.3cm rim-enhancing lesion within the left frontal cortex concerning for metastatic disease. CT chest showed an obstructing left upper lobe endobronchial lesion. She had bronchial biopsy, consistent with poorly differentiated adenocarcinoma 22. Patient underwent Right craniotomy for resection of brain mass 22, Pathology +NSC carcinoma (favor adeno). Plans for out-pt PET/CT scan, RT, and consideration for systemic therapy.     Post-op course significant for STEMI 22. Not a candidate for cath secondary to comorbidities and recent surgery. She was started on ASA/ heparin gtt and a betablocker. TTE  +normal LV internal dimensions with discrete upper septal hypertrophy. EF 55%, inferolateral wall is akinetic. She was found to have RML segmental PE 8./3. Hospital course also significant for hyperkalemia, and encephalopathy, likely secondary to UTI/early urosepsis (treated with 5 days of Zosyn). EEG +Right temporal sharp waves, no seizures; started on Briviact 8/3/22. She developed new onset Afib w RVR on 22. with elevated troponin; metoprolol was increased, and heparin anticoagulation switched to Eliquis on 22.    Patient was evaluated by PM&R and therapy for functional deficits, gait/ADL impairments and acute rehabilitation was recommended. Patient was medically optimized for discharge to [facility] IRF 8/10/22. (10 Aug 2022 13:45)      PAST MEDICAL & SURGICAL HISTORY:  Hypertension      Coronary artery disease      Hyperlipidemia      Peripheral artery disease      Myocardial infarct      Peripheral neuropathy      Diabetes      Chronic back pain      Former smoker      PAD (peripheral artery disease)      CAD (coronary artery disease)  stents          MEDICATIONS  (STANDING):  artificial  tears Solution 1 Drop(s) Both EYES three times a day  atorvastatin 40 milliGRAM(s) Oral at bedtime  brivaracetam 50 milliGRAM(s) Oral every 12 hours  dextrose 5%. 1000 milliLiter(s) (100 mL/Hr) IV Continuous <Continuous>  dextrose 5%. 1000 milliLiter(s) (50 mL/Hr) IV Continuous <Continuous>  dextrose 50% Injectable 25 Gram(s) IV Push once  dextrose 50% Injectable 12.5 Gram(s) IV Push once  dextrose 50% Injectable 25 Gram(s) IV Push once  diVALproex  milliGRAM(s) Oral every 8 hours  enoxaparin Injectable 40 milliGRAM(s) SubCutaneous <User Schedule>  gabapentin 200 milliGRAM(s) Oral at bedtime  glucagon  Injectable 1 milliGRAM(s) IntraMuscular once  insulin glargine Injectable (LANTUS) 18 Unit(s) SubCutaneous at bedtime  insulin lispro (ADMELOG) corrective regimen sliding scale   SubCutaneous three times a day before meals  insulin lispro (ADMELOG) corrective regimen sliding scale   SubCutaneous at bedtime  insulin lispro Injectable (ADMELOG) 7 Unit(s) SubCutaneous three times a day before meals  melatonin 6 milliGRAM(s) Oral at bedtime  metoprolol tartrate 25 milliGRAM(s) Oral two times a day  pantoprazole    Tablet 40 milliGRAM(s) Oral before breakfast  polyethylene glycol 3350 17 Gram(s) Oral daily  senna 2 Tablet(s) Oral at bedtime  zinc oxide 40% Paste 1 Application(s) Topical two times a day    MEDICATIONS  (PRN):  acetaminophen     Tablet .. 650 milliGRAM(s) Oral every 6 hours PRN Temp greater or equal to 38C (100.4F), Mild Pain (1 - 3)  bisacodyl Oral Tab/Cap - Peds 5 milliGRAM(s) Oral daily PRN Constipation  dextrose Oral Gel 15 Gram(s) Oral once PRN Blood Glucose LESS THAN 70 milliGRAM(s)/deciliter      Allergies    adhesives (Pruritus)  No Known Drug Allergies    Intolerances          VITALS  82y  Vital Signs Last 24 Hrs  T(C): 36.4 (16 Aug 2022 19:20), Max: 36.4 (16 Aug 2022 19:20)  T(F): 97.6 (16 Aug 2022 19:20), Max: 97.6 (16 Aug 2022 19:20)  HR: 74 (17 Aug 2022 05:21) (74 - 90)  BP: 106/76 (17 Aug 2022 05:21) (106/76 - 116/86)  BP(mean): --  RR: 16 (16 Aug 2022 19:20) (16 - 16)  SpO2: 97% (16 Aug 2022 19:20) (97% - 97%)    Parameters below as of 16 Aug 2022 19:20  Patient On (Oxygen Delivery Method): room air      Daily     Daily Weight in k.5 (17 Aug 2022 04:11)        RECENT LABS:          144  |  109<H>  |  57<H>  ----------------------------<  71  3.8   |  25  |  1.04    Ca    9.2      16 Aug 2022 06:38                CAPILLARY BLOOD GLUCOSE      POCT Blood Glucose.: 77 mg/dL (17 Aug 2022 07:49)  POCT Blood Glucose.: 67 mg/dL (17 Aug 2022 07:45)  POCT Blood Glucose.: 72 mg/dL (16 Aug 2022 22:18)  POCT Blood Glucose.: 85 mg/dL (16 Aug 2022 17:02)  POCT Blood Glucose.: 174 mg/dL (16 Aug 2022 11:57)        Review of Systems:   · Additional ROS	Discussed Dr Grady's recommendations, patient agreeable    Has mild sensitivity in eye which she states she always has (uses less eye makeup because of it). and dryness. No diplopia complaints. Denies H/A    Had some dysuria earlier and UA sent. BUn/Cr 57/1.04 and need for IVF discussed; patient not happy but agreeable. importance of trying to increae po liquids reinforced. BP sl better    Physical Exam:   · Constitutional Comments	alert O x 2-3   conjunctiva clear.no injection    right cranial incision healing well, C/D/I no swelling  · Respiratory	clear to auscultation bilaterally; no wheezes; no rales; no rhonchi; good- effort  · Cardiovascular	regular rate and rhythm; S1 S2 present; no gallops; no rub; no murmur  · Gastrointestinal	soft; nontender; nondistended; normal active bowel sounds  · Musculoskeletal	no calf tenderness no erythema or warmth  · Musculoskeletal Comments	bilateral HF 4/5 quad 4+/5 ankle PF and DF 5/5 normal tone

## 2022-08-17 NOTE — PROGRESS NOTE ADULT - ASSESSMENT
Patient is a 81 YO female with PMHx HTN, DM, HLD, CAD, MI, PAD, peripheral neuropathy, former smoker, who presented to [facility] ED on 7/19/22 with brain mass s/p Right craniotomy/resection 7/27/22, Pathology +NSC carcinoma. Post-op course significant for STEMI, hyperkalemia, encephalopathy 2/2 UTI, new onset a-fib.    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 Grady. 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   - afebrile  - CBC 8/18    # RI  - s/p IVF NS bolus prior  - encourage fluids  - BUn/Cr 57/1.04 8/17. Will resume IVF NS  - BMP 8/18    # Thyroid Nodule  - f/u with Endocrinology on dc    # PE  - Eliquis 5mg BID    # Pain management  - Tylenol PRN  - gabapentin    # GI ppx  - Protonix 40mg  - Senna, miralax PRN    # FEN   - Diet: Consistent Carbohydrate Diabetic     # Skin:  - Skin on admission: stage 1 pressure ulcer on LLE lateral heel and right buttock, cranial staples c/d/i. Forehead open blister area with granulation tissue  - Pressure injury/Skin: Turn Q2hrs while in bed, OOB to Chair, PT/OT     # DVT ppx  - eliquis    # Case discussed in IDT rounds 8/15.  - For tentative dc date home 8/24 with caregiver support during waking hours at supervision/CG level, and home PT, OT SLP  - will need caregiver training    # LABS  CBC BMP 8/18 Patient is a 81 YO female with PMHx HTN, DM, HLD, CAD, MI, PAD, peripheral neuropathy, former smoker, who presented to [facility] ED on 7/19/22 with brain mass s/p Right craniotomy/resection 7/27/22, Pathology +NSC carcinoma. Post-op course significant for STEMI, hyperkalemia, encephalopathy 2/2 UTI, new onset a-fib.    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 Discussed with patient    # HTN/CAD/A-fib  - c/w ASA  - Eliquis 5mg BID  - Metoprolol 25mg BID  - encourage po fluids  - Lipitor 40mg daily continues  -  (106/76 - 116/86) 8/17 improved    # DMII  - a1c 8.7 on 7/20  - ISS and FS  - lantus / admleog per hospitalist    # leukocytosis  - likely from steroids.   - afebrile  - CBC 8/18    # RI  - s/p IVF NS bolus prior  - encourage fluids  - BUn/Cr 57/1.04 8/17. Will resume IVF NS, discussed with patient  - BMP 8/18    # Thyroid Nodule  - f/u with Endocrinology on dc    # PE  - Eliquis 5mg BID    # Pain management  - Tylenol PRN  - gabapentin    # GI ppx  - Protonix 40mg  - Senna, miralax PRN    #   - dysuria 8/17  - UA +. many bacteria mod LE WBC >50  - urine Cx ordered. will discuss with hospitalist empiric Abx or hold for Cx results  - resume bladder scan q8 8/17 r/o retention    # FEN   - Diet: Consistent Carbohydrate Diabetic     # Skin:  - Skin on admission: stage 1 pressure ulcer on LLE lateral heel and right buttock, cranial staples c/d/i. Forehead open blister area with granulation tissue  - Pressure injury/Skin: Turn Q2hrs while in bed, OOB to Chair, PT/OT     # DVT ppx  - eliquis    # Case discussed in IDT rounds 8/15.  - For tentative dc date home 8/24 with caregiver support during waking hours at supervision/CG level, and home PT, OT SLP  - will need caregiver training    # LABS  CBC BMP 8/18  urine Cx Patient is a 81 YO female with PMHx HTN, DM, HLD, CAD, MI, PAD, peripheral neuropathy, former smoker, who presented to [facility] ED on 7/19/22 with brain mass s/p Right craniotomy/resection 7/27/22, Pathology +NSC carcinoma. Post-op course significant for STEMI, hyperkalemia, encephalopathy 2/2 UTI, new onset a-fib.    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 Will have in=-house oncologist per family request  - continue Comprehensive Rehab Program: PT/OT/ST, 3hours daily and 5 days weekly  - recreation therapy  - Precautions: cardiac, DM, fall, sensory, SZ, AMS, aspiration, AC    # dry eye  - artiificial tears TID standing  - private ophthalmologist Dr. 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 Discussed with patient    # HTN/CAD/A-fib  - c/w ASA  - Eliquis 5mg BID  - Metoprolol 25mg BID  - encourage po fluids  - Lipitor 40mg daily continues  - (106/76 - 116/86) 8/17 improved    # DMII  - a1c 8.7 on 7/20  - ISS and FS  - lantus / admleog per hospitalist    # leukocytosis  - likely from steroids.   - afebrile  - CBC 8/18    # RI  - s/p IVF NS bolus prior  - encourage fluids  - BUn/Cr 57/1.04 8/17. Will resume IVF NS, discussed with patient  - BMP 8/18    # Thyroid Nodule  - f/u with Endocrinology on dc    # PE  - Eliquis 5mg BID    # Pain management  - Tylenol PRN  - gabapentin    # GI ppx  - Protonix 40mg  - Senna, miralax PRN    #   - dysuria 8/17  - UA +. many bacteria mod LE WBC >50  - urine Cx ordered. will discuss with hospitalist empiric Abx or hold for Cx results  - resume bladder scan q8 8/17 r/o retention    # FEN   - Diet: Consistent Carbohydrate Diabetic     # Skin:  - Skin on admission: stage 1 pressure ulcer on LLE lateral heel and right buttock, cranial staples c/d/i. Forehead open blister area with granulation tissue  - Pressure injury/Skin: Turn Q2hrs while in bed, OOB to Chair, PT/OT     # DVT ppx  - eliquis    # Case discussed in IDT rounds 8/15.  - For tentative dc date home 8/24 with caregiver support during waking hours at supervision/CG level, and home PT, OT SLP  - will need caregiver training  - case discussed with daughter Suzan [phone] 8/17    # LABS  CBC BMP 8/18  urine Cx Patient is a 81 YO female with PMHx HTN, DM, HLD, CAD, MI, PAD, peripheral neuropathy, former smoker, who presented to [facility] ED on 7/19/22 with brain mass s/p Right craniotomy/resection 7/27/22, Pathology +NSC carcinoma. Post-op course significant for STEMI, hyperkalemia, encephalopathy 2/2 UTI, new onset a-fib.    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 Will have in=-house oncologist per family request  - continue Comprehensive Rehab Program: PT/OT/ST, 3hours daily and 5 days weekly  - recreation therapy  - Precautions: cardiac, DM, fall, sensory, SZ, AMS, aspiration, AC    # dry eye  - artiificial tears TID standing  - private ophthalmologist Dr. 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 Discussed with patient    # HTN/CAD/A-fib  - c/w ASA  - Eliquis 5mg BID  - Metoprolol 25mg BID  - encourage po fluids  - Lipitor 40mg daily continues  - (106/76 - 116/86) 8/17 improved    # DMII  - a1c 8.7 on 7/20  - ISS and FS  - lantus / admleog per hospitalist    # leukocytosis  - likely from steroids.   - afebrile  - CBC 8/18    # RI  - s/p IVF NS bolus prior  - encourage fluids  - BUn/Cr 57/1.04 8/17. Will resume IVF NS, discussed with patient  - BMP 8/18    # Thyroid Nodule  - f/u with Endocrinology on dc    # PE  - Eliquis 5mg BID    # Pain management  - Tylenol PRN  - gabapentin    # GI ppx  - Protonix 40mg  - Senna, miralax PRN    #   - dysuria 8/17  - UA +. many bacteria mod LE WBC >50  - urine Cx ordered. start Macrobid 100mg BID x 5 days 8/17  - resume bladder scan q8 8/17 r/o retention    # FEN   - Diet: Consistent Carbohydrate Diabetic     # Skin:  - Skin on admission: stage 1 pressure ulcer on LLE lateral heel and right buttock, cranial staples c/d/i. Forehead open blister area with granulation tissue  - Pressure injury/Skin: Turn Q2hrs while in bed, OOB to Chair, PT/OT     # DVT ppx  - eliquis    # Case discussed in IDT rounds 8/15.  - For tentative dc date home 8/24 with caregiver support during waking hours at supervision/CG level, and home PT, OT SLP  - will need caregiver training  - case discussed with daughter Suzan [phone] 8/17    # LABS  CBC BMP 8/18  urine Cx Patient is a 83 YO female with PMHx HTN, DM, HLD, CAD, MI, PAD, peripheral neuropathy, former smoker, who presented to [facility] ED on 7/19/22 with brain mass s/p Right craniotomy/resection 7/27/22, Pathology +NSC carcinoma. Post-op course significant for STEMI, hyperkalemia, encephalopathy 2/2 UTI, new onset a-fib.    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 Will have in=-house oncologist per family request  - continue Comprehensive Rehab Program: PT/OT/ST, 3hours daily and 5 days weekly  - recreation therapy  - Precautions: cardiac, DM, fall, sensory, SZ, AMS, aspiration, AC    # dry eye  - artiificial tears TID standing  - private ophthalmologist Dr. 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 Discussed with patient    # HTN/CAD/A-fib  - c/w ASA  - Eliquis 5mg BID  - Metoprolol 25mg BID  - encourage po fluids  - Lipitor 40mg daily continues  - (106/76 - 116/86) 8/17 improved    # DMII  - a1c 8.7 on 7/20  - ISS and FS  - lantus / admleog per hospitalist    # leukocytosis  - likely from steroids.   - afebrile  - CBC 8/18    # RI  - s/p IVF NS bolus prior  - encourage fluids  - BUn/Cr 57/1.04 8/17. Will resume IVF NS, discussed with patient  - BMP 8/18    # Thyroid Nodule  - f/u with Endocrinology on dc    # PE  - Eliquis 5mg BID    # Pain management  - Tylenol PRN  - gabapentin    # GI ppx  - Protonix 40mg  - Senna, miralax PRN    #   - dysuria 8/17  - UA +. many bacteria mod LE WBC >50  - urine Cx ordered. start cipro 500 mg q12 x 3 days, discussed with hospitalist and pharmacy (macrobid dc due to renal function)  - resume bladder scan q8 8/17 r/o retention    # FEN   - Diet: Consistent Carbohydrate Diabetic     # Skin:  - Skin on admission: stage 1 pressure ulcer on LLE lateral heel and right buttock, cranial staples c/d/i. Forehead open blister area with granulation tissue  - Pressure injury/Skin: Turn Q2hrs while in bed, OOB to Chair, PT/OT     # DVT ppx  - eliquis    # Case discussed in IDT rounds 8/15.  - For tentative dc date home 8/24 with caregiver support during waking hours at supervision/CG level, and home PT, OT SLP  - will need caregiver training  - case discussed with daughter Suzan [phone] 8/17    # LABS  CBC BMP 8/18  urine Cx

## 2022-08-18 LAB
ANION GAP SERPL CALC-SCNC: 9 MMOL/L — SIGNIFICANT CHANGE UP (ref 5–17)
BUN SERPL-MCNC: 48 MG/DL — HIGH (ref 7–23)
CALCIUM SERPL-MCNC: 8.5 MG/DL — SIGNIFICANT CHANGE UP (ref 8.4–10.5)
CHLORIDE SERPL-SCNC: 107 MMOL/L — SIGNIFICANT CHANGE UP (ref 96–108)
CO2 SERPL-SCNC: 27 MMOL/L — SIGNIFICANT CHANGE UP (ref 22–31)
CREAT SERPL-MCNC: 1.27 MG/DL — SIGNIFICANT CHANGE UP (ref 0.5–1.3)
EGFR: 42 ML/MIN/1.73M2 — LOW
GLUCOSE BLDC GLUCOMTR-MCNC: 111 MG/DL — HIGH (ref 70–99)
GLUCOSE BLDC GLUCOMTR-MCNC: 72 MG/DL — SIGNIFICANT CHANGE UP (ref 70–99)
GLUCOSE BLDC GLUCOMTR-MCNC: 76 MG/DL — SIGNIFICANT CHANGE UP (ref 70–99)
GLUCOSE BLDC GLUCOMTR-MCNC: 97 MG/DL — SIGNIFICANT CHANGE UP (ref 70–99)
GLUCOSE SERPL-MCNC: 118 MG/DL — HIGH (ref 70–99)
HCT VFR BLD CALC: 34.5 % — SIGNIFICANT CHANGE UP (ref 34.5–45)
HGB BLD-MCNC: 11 G/DL — LOW (ref 11.5–15.5)
MCHC RBC-ENTMCNC: 31.4 PG — SIGNIFICANT CHANGE UP (ref 27–34)
MCHC RBC-ENTMCNC: 31.9 GM/DL — LOW (ref 32–36)
MCV RBC AUTO: 98.6 FL — SIGNIFICANT CHANGE UP (ref 80–100)
NRBC # BLD: 0 /100 WBCS — SIGNIFICANT CHANGE UP (ref 0–0)
PLATELET # BLD AUTO: 305 K/UL — SIGNIFICANT CHANGE UP (ref 150–400)
POTASSIUM SERPL-MCNC: 3.3 MMOL/L — LOW (ref 3.5–5.3)
POTASSIUM SERPL-SCNC: 3.3 MMOL/L — LOW (ref 3.5–5.3)
RBC # BLD: 3.5 M/UL — LOW (ref 3.8–5.2)
RBC # FLD: 14.2 % — SIGNIFICANT CHANGE UP (ref 10.3–14.5)
SODIUM SERPL-SCNC: 143 MMOL/L — SIGNIFICANT CHANGE UP (ref 135–145)
WBC # BLD: 7.74 K/UL — SIGNIFICANT CHANGE UP (ref 3.8–10.5)
WBC # FLD AUTO: 7.74 K/UL — SIGNIFICANT CHANGE UP (ref 3.8–10.5)

## 2022-08-18 PROCEDURE — 99232 SBSQ HOSP IP/OBS MODERATE 35: CPT

## 2022-08-18 RX ORDER — POTASSIUM CHLORIDE 20 MEQ
20 PACKET (EA) ORAL
Refills: 0 | Status: COMPLETED | OUTPATIENT
Start: 2022-08-18 | End: 2022-08-18

## 2022-08-18 RX ORDER — SODIUM CHLORIDE 9 MG/ML
1000 INJECTION INTRAMUSCULAR; INTRAVENOUS; SUBCUTANEOUS
Refills: 0 | Status: DISCONTINUED | OUTPATIENT
Start: 2022-08-18 | End: 2022-08-19

## 2022-08-18 RX ADMIN — Medication 6 MILLIGRAM(S): at 21:56

## 2022-08-18 RX ADMIN — POLYETHYLENE GLYCOL 3350 17 GRAM(S): 17 POWDER, FOR SOLUTION ORAL at 14:22

## 2022-08-18 RX ADMIN — ZINC OXIDE 1 APPLICATION(S): 200 OINTMENT TOPICAL at 17:46

## 2022-08-18 RX ADMIN — BRIVARACETAM 50 MILLIGRAM(S): 25 TABLET, FILM COATED ORAL at 17:31

## 2022-08-18 RX ADMIN — Medication 20 MILLIEQUIVALENT(S): at 14:20

## 2022-08-18 RX ADMIN — Medication 500 MILLIGRAM(S): at 05:50

## 2022-08-18 RX ADMIN — GABAPENTIN 200 MILLIGRAM(S): 400 CAPSULE ORAL at 21:56

## 2022-08-18 RX ADMIN — DIVALPROEX SODIUM 500 MILLIGRAM(S): 500 TABLET, DELAYED RELEASE ORAL at 05:49

## 2022-08-18 RX ADMIN — Medication 1 DROP(S): at 14:20

## 2022-08-18 RX ADMIN — SENNA PLUS 2 TABLET(S): 8.6 TABLET ORAL at 21:56

## 2022-08-18 RX ADMIN — ENOXAPARIN SODIUM 40 MILLIGRAM(S): 100 INJECTION SUBCUTANEOUS at 17:30

## 2022-08-18 RX ADMIN — DIVALPROEX SODIUM 500 MILLIGRAM(S): 500 TABLET, DELAYED RELEASE ORAL at 21:56

## 2022-08-18 RX ADMIN — Medication 25 MILLIGRAM(S): at 05:49

## 2022-08-18 RX ADMIN — PANTOPRAZOLE SODIUM 40 MILLIGRAM(S): 20 TABLET, DELAYED RELEASE ORAL at 05:49

## 2022-08-18 RX ADMIN — BRIVARACETAM 50 MILLIGRAM(S): 25 TABLET, FILM COATED ORAL at 05:51

## 2022-08-18 RX ADMIN — Medication 1 DROP(S): at 05:49

## 2022-08-18 RX ADMIN — DIVALPROEX SODIUM 500 MILLIGRAM(S): 500 TABLET, DELAYED RELEASE ORAL at 14:20

## 2022-08-18 RX ADMIN — Medication 7 UNIT(S): at 08:28

## 2022-08-18 RX ADMIN — Medication 7 UNIT(S): at 17:28

## 2022-08-18 RX ADMIN — Medication 25 MILLIGRAM(S): at 17:28

## 2022-08-18 RX ADMIN — SODIUM CHLORIDE 60 MILLILITER(S): 9 INJECTION INTRAMUSCULAR; INTRAVENOUS; SUBCUTANEOUS at 14:24

## 2022-08-18 RX ADMIN — Medication 20 MILLIEQUIVALENT(S): at 12:40

## 2022-08-18 RX ADMIN — Medication 500 MILLIGRAM(S): at 17:28

## 2022-08-18 RX ADMIN — ATORVASTATIN CALCIUM 40 MILLIGRAM(S): 80 TABLET, FILM COATED ORAL at 21:56

## 2022-08-18 NOTE — PROGRESS NOTE ADULT - ASSESSMENT
Patient is a 81 YO female with PMHx HTN, DM, HLD, CAD, MI, PAD, peripheral neuropathy, former smoker, who presented to [facility] ED on 7/19/22 with brain mass s/p Right craniotomy/resection 7/27/22, Pathology +NSC carcinoma. Post-op course significant for STEMI, hyperkalemia, encephalopathy 2/2 UTI, new onset a-fib.    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 Will have in=-house oncologist per family request  - continue Comprehensive Rehab Program: PT/OT/ST, 3hours daily and 5 days weekly  - recreation therapy  - Precautions: cardiac, DM, fall, sensory, SZ, AMS, aspiration, AC    # dry eye  - artiificial tears TID standing  - private ophthalmologist Dr. 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 Discussed with patient    # HTN/CAD/A-fib  - c/w ASA  - Eliquis 5mg BID  - Metoprolol 25mg BID  - encourage po fluids  - Lipitor 40mg daily continues  - (108/73 - 122/69) 8/18 improved    # DMII  - a1c 8.7 on 7/20  - ISS and FS  - lantus / admleog per hospitalist    # leukocytosis  - likely from steroids.   - afebrile  - CBC 8/22    # RI  - s/p IVF NS bolus prior  - encourage fluids  - BUn/Cr 48/1.27 8/18. Will resume IVF NS, discussed with patient  - BMP 8/22    # Thyroid Nodule  - f/u with Endocrinology on dc    # PE  - Eliquis 5mg BID    # Pain management  - Tylenol PRN  - gabapentin    # GI ppx  - Protonix 40mg  - Senna, miralax PRN    #   - dysuria 8/17  - UA +. many bacteria mod LE WBC >50  - urine Cx ordered. start cipro 500 mg q12 x 3 days, discussed with hospitalist and pharmacy (macrobid dc due to renal function)  - resume bladder scan q8 8/17 r/o retention    # FEN   - Diet: Consistent Carbohydrate Diabetic     # Skin:  - Skin on admission: stage 1 pressure ulcer on LLE lateral heel and right buttock, cranial staples c/d/i. Forehead open blister area with granulation tissue  - Pressure injury/Skin: Turn Q2hrs while in bed, OOB to Chair, PT/OT     # DVT ppx  - eliquis    # Case discussed in IDT rounds 8/15.  - For tentative dc date home 8/24 with caregiver support during waking hours at supervision/CG level, and home PT, OT SLP  - will need caregiver training  - case discussed with daughter Suzan [phone] 8/17    # LABS  CBC BMP 8/22  urine Cx Patient is a 83 YO female with PMHx HTN, DM, HLD, CAD, MI, PAD, peripheral neuropathy, former smoker, who presented to [facility] ED on 7/19/22 with brain mass s/p Right craniotomy/resection 7/27/22, Pathology +NSC carcinoma. Post-op course significant for STEMI, hyperkalemia, encephalopathy 2/2 UTI, new onset a-fib.    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 in-house oncology consult pending per family request  - continue Comprehensive Rehab Program: PT/OT/ST, 3hours daily and 5 days weekly  - recreation therapy  - Precautions: cardiac, DM, fall, sensory, SZ, AMS, aspiration, AC    # dry eye  - artiificial tears TID standing  - private ophthalmologist Dr. 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 Discussed with patient    # HTN/CAD/A-fib  - c/w ASA  - Eliquis 5mg BID  - Metoprolol 25mg BID  - encourage po fluids  - Lipitor 40mg daily continues  - (108/73 - 122/69) 8/18 improved    # DMII  - a1c 8.7 on 7/20  - ISS and FS  - lantus / admleog per hospitalist    # leukocytosis  - likely due to steroids, UTI  - afebrile. WBC 7.7 8/18 resolved  - CBC 8/22    # RI  - s/p IVF NS bolus prior  - encourage fluids  - BUn/Cr 48/1.27 8/18. Will resume IVF NS, discussed with patient  - BMP 8/19    # Thyroid Nodule  - f/u with Endocrinology on dc    # PE  - Eliquis 5mg BID    # Pain management  - Tylenol PRN  - gabapentin    # GI ppx  - Protonix 40mg  - Senna, miralax PRN    #   - dysuria 8/17  - UA +. many bacteria mod LE WBC >50  - urine Cx ordered. start cipro 500 mg q12 x 3 days, discussed with hospitalist and pharmacy (macrobid dc due to renal function)  - resume bladder scan q8 8/17 r/o retention    # FEN   - Diet: Consistent Carbohydrate Diabetic     # Skin:  - Skin on admission: stage 1 pressure ulcer on LLE lateral heel and right buttock, cranial staples c/d/i. Forehead open blister area with granulation tissue  - Pressure injury/Skin: Turn Q2hrs while in bed, OOB to Chair, PT/OT     # DVT ppx  - eliquis    # Case discussed in IDT rounds 8/15.  - For tentative dc date home 8/24 with caregiver support during waking hours at supervision/CG level, and home PT, OT SLP  - will need caregiver training  - case discussed with daughter Suzan [phone] 8/17    # LABS  CBC BMP 8/22  urine Cx  heme onc

## 2022-08-18 NOTE — PROGRESS NOTE ADULT - ATTENDING COMMENTS
Progress note amended to include my discussions with patient, resident. Patient eating breakfast, had shower this morning. Denies any dysuria today, and had primafit placed last night to help with frequency especially in setting of IVF. Looks more alert today, BP improved, less fatigued, and BUn/Cr 48/1.27. Will continue IVF today; repeat BMP in AM, discussed with patient. Urine Cx pending, afebrile and leukocytosis resolved    Also found to be hypokalemic, repleted. monitor. Heme onc pending as family would like to have services at [facility] on dc, Sw also helping coordinate for services on dc. Continue program

## 2022-08-18 NOTE — PROGRESS NOTE ADULT - ASSESSMENT
82 year old F PMH HTN, DM, HLD, CAD, MI, PAD, peripheral neuropathy, former smoker presented to [facility] ED on 7/19/22 with brain mass s/p Right craniotomy/resection 7/27/22. 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, primary lung  #ADL impairement  - MR brain 7/20/22 with Right temporal lobe peripherally enhancing, centrally necrotic 3.1 x 2.6 cm mass with surrounding vasogenic edema with METS  - s/p right craniotomy for resection of brain mass 7/27/22  - Bronchial biopsy consistent with poorly differentiated adenocarcinoma 7/20/22  - continue with Brivaracetam 50mg BID and decadron taper  - continue depakote 500mg q8hrs for seizure ppx  - will need outpatient PET/CT scan, radiation therapy and further evaluation with heme/onc    #PERCY  - Resolved  - encourage PO fluid intake    #orthostatic  - orthostatic in PT, possibly from deconditioning and multiple BP meds  - Off antihypertensives losartan and amlodipine   - encourage PO intake  - continue to monitor     #leukocytosis  - Resolved    #CAD  #STEMI  #new onset afib  #HTN  #HLD  - echo 8/8/22 with EF 60-65%, basal inferolateral wall, the basal inferior wall, the basal anterolateral wall, and the mid inferior wall are hypokinetic  - not candidate for PCI given risks > benefit as per cath team due to recent craniotomy   - continue asa 81mg and lopressor 25mg BID, atorvastatin 40mg daily   - continue eliquis 5mg BID  - follow up EKG  - Off antihypertensives losartan and amlodipine     #DM type 2  -Hypoglycemic episodes  - HbA1C 8.7  - Decrease Lantus to 12U and continue admelog 7U TID qAC    #history of PE  - found to have RML segmental PE on 8/3  - continue Eliquis 5mg BID    #anemia  - H/H stable  - anemia of chronic disease due to malignancy as per heme    #DVT ppx  - on Eliquis

## 2022-08-18 NOTE — PROVIDER CONTACT NOTE (OTHER) - RECOMMENDATIONS
DAVID Li advised to have patient eat crackers and drink orange juice, then recheck blood glucose.  Patient refused to drink OJ and eat crackers.  DAVID Li advised to hold Lantus 12 units

## 2022-08-18 NOTE — PROGRESS NOTE ADULT - SUBJECTIVE AND OBJECTIVE BOX
Patient is a 82y old  Female who presents with a chief complaint of meetastatic adenoCA to brain s/p resection 22 (18 Aug 2022 12:19)      HPI:  Patient is a 83 YO female RH dominant with PMHx HTN, DM, HLD, CAD, MI, PAD, peripheral neuropathy, former smoker, who presented to [facility] ED on 22 with chronic back pain and gait/balance issues x 4 months. She was found to have a brain mass on imaging while undergoing work up by her orthopedist (Dr. Castro).    MR brain 22 +Right temporal lobe peripherally enhancing, centrally necrotic 3.1 x 2.6 cm mass with surrounding vasogenic edema. 8 mm leftward midline shift. Right uncal herniation. Additional left parafalcine 1.3 x 0.9 cm rim-enhancing cystic lesion with mild surrounding vasogenic edema. Tiny 0.3 x 0.3cm rim-enhancing lesion within the left frontal cortex concerning for metastatic disease. CT chest showed an obstructing left upper lobe endobronchial lesion. She had bronchial biopsy, consistent with poorly differentiated adenocarcinoma 22. Patient underwent Right craniotomy for resection of brain mass 22, Pathology +NSC carcinoma (favor adeno). Plans for out-pt PET/CT scan, RT, and consideration for systemic therapy.     Post-op course significant for STEMI 22. Not a candidate for cath secondary to comorbidities and recent surgery. She was started on ASA/ heparin gtt and a betablocker. TTE  +normal LV internal dimensions with discrete upper septal hypertrophy. EF 55%, inferolateral wall is akinetic. She was found to have RML segmental PE 8./3. Hospital course also significant for hyperkalemia, and encephalopathy, likely secondary to UTI/early urosepsis (treated with 5 days of Zosyn). EEG +Right temporal sharp waves, no seizures; started on Briviact 8/3/22. She developed new onset Afib w RVR on 22. with elevated troponin; metoprolol was increased, and heparin anticoagulation switched to Eliquis on 22.    Patient was evaluated by PM&R and therapy for functional deficits, gait/ADL impairments and acute rehabilitation was recommended. Patient was medically optimized for discharge to [facility] IRF 8/10/22. (10 Aug 2022 13:45)      PAST MEDICAL & SURGICAL HISTORY:  Hypertension      Coronary artery disease      Hyperlipidemia      Peripheral artery disease      Myocardial infarct      Peripheral neuropathy      Diabetes      Chronic back pain      Former smoker      PAD (peripheral artery disease)      CAD (coronary artery disease)  stents          Allergies    adhesives (Pruritus)  No Known Drug Allergies    Intolerances          PHYSICAL EXAM    VITALS  82y  Vital Signs Last 24 Hrs  T(C): 36.3 (18 Aug 2022 09:21), Max: 36.8 (17 Aug 2022 19:30)  T(F): 97.4 (18 Aug 2022 09:21), Max: 98.3 (17 Aug 2022 19:30)  HR: 63 (18 Aug 2022 09:21) (63 - 91)  BP: 108/73 (18 Aug 2022 09:21) (108/73 - 129/88)  BP(mean): --  RR: 16 (18 Aug 2022 09:21) (16 - 16)  SpO2: 98% (18 Aug 2022 09:21) (98% - 98%)    Parameters below as of 18 Aug 2022 09:21  Patient On (Oxygen Delivery Method): room air      Daily     Daily         RECENT LABS:                          11.0   7.74  )-----------( 305      ( 18 Aug 2022 06:45 )             34.5     08-18    143  |  107  |  48<H>  ----------------------------<  118<H>  3.3<L>   |  27  |  1.27    Ca    8.5      18 Aug 2022 06:45          Urinalysis Basic - ( 17 Aug 2022 13:38 )    Color: Yellow / Appearance: Slightly Turbid / S.015 / pH: x  Gluc: x / Ketone: Trace  / Bili: Negative / Urobili: Negative   Blood: x / Protein: 15 / Nitrite: Negative   Leuk Esterase: Moderate / RBC: Negative /HPF / WBC >50 /HPF   Sq Epi: x / Non Sq Epi: Neg.-Few / Bacteria: Many /HPF          CAPILLARY BLOOD GLUCOSE      POCT Blood Glucose.: 76 mg/dL (18 Aug 2022 12:33)  POCT Blood Glucose.: 97 mg/dL (18 Aug 2022 08:24)  POCT Blood Glucose.: 98 mg/dL (17 Aug 2022 22:04)  POCT Blood Glucose.: 142 mg/dL (17 Aug 2022 17:11)      MEDICATIONS  (STANDING):  artificial  tears Solution 1 Drop(s) Both EYES three times a day  atorvastatin 40 milliGRAM(s) Oral at bedtime  brivaracetam 50 milliGRAM(s) Oral every 12 hours  ciprofloxacin     Tablet 500 milliGRAM(s) Oral every 12 hours  dextrose 5%. 1000 milliLiter(s) (100 mL/Hr) IV Continuous <Continuous>  dextrose 5%. 1000 milliLiter(s) (50 mL/Hr) IV Continuous <Continuous>  dextrose 50% Injectable 25 Gram(s) IV Push once  dextrose 50% Injectable 12.5 Gram(s) IV Push once  dextrose 50% Injectable 25 Gram(s) IV Push once  diVALproex  milliGRAM(s) Oral every 8 hours  enoxaparin Injectable 40 milliGRAM(s) SubCutaneous <User Schedule>  gabapentin 200 milliGRAM(s) Oral at bedtime  glucagon  Injectable 1 milliGRAM(s) IntraMuscular once  insulin glargine Injectable (LANTUS) 12 Unit(s) SubCutaneous at bedtime  insulin lispro (ADMELOG) corrective regimen sliding scale   SubCutaneous three times a day before meals  insulin lispro (ADMELOG) corrective regimen sliding scale   SubCutaneous at bedtime  insulin lispro Injectable (ADMELOG) 7 Unit(s) SubCutaneous three times a day before meals  melatonin 6 milliGRAM(s) Oral at bedtime  metoprolol tartrate 25 milliGRAM(s) Oral two times a day  pantoprazole    Tablet 40 milliGRAM(s) Oral before breakfast  polyethylene glycol 3350 17 Gram(s) Oral daily  potassium chloride    Tablet ER 20 milliEquivalent(s) Oral every 2 hours  senna 2 Tablet(s) Oral at bedtime  sodium chloride 0.9%. 1000 milliLiter(s) (60 mL/Hr) IV Continuous <Continuous>  zinc oxide 40% Paste 1 Application(s) Topical two times a day    MEDICATIONS  (PRN):  acetaminophen     Tablet .. 650 milliGRAM(s) Oral every 6 hours PRN Temp greater or equal to 38C (100.4F), Mild Pain (1 - 3)  bisacodyl Oral Tab/Cap - Peds 5 milliGRAM(s) Oral daily PRN Constipation  dextrose Oral Gel 15 Gram(s) Oral once PRN Blood Glucose LESS THAN 70 milliGRAM(s)/deciliter      Review of Systems:   · Additional ROS	Patient in NAD and resting comfortably in bed. Last BM yesterday. Patient is currently on NS@60cc/h with last Cr slightly higher at 1.27. Denies dysuria but does complain of some burning sensation when urinating yesterday. Denies HA, N/V, sob, dyspnea, CP, dizziness or confusion. Tolerating therapies well without any other complaints.       Physical Exam:   · Constitutional Comments	alert O x 2-3   conjunctiva clear.no injection    right cranial incision healing well, C/D/I no swelling  · Respiratory	clear to auscultation bilaterally; no wheezes; no rales; no rhonchi; good- effort  · Cardiovascular	regular rate and rhythm; S1 S2 present; no gallops; no rub; no murmur  · Gastrointestinal	soft; nontender; nondistended; normal active bowel sounds  · Musculoskeletal	no calf tenderness no erythema or warmth  · Musculoskeletal Comments	bilateral HF 4/5 quad 4+/5 ankle PF and DF 5/5 normal tone       Patient is a 82y old  Female who presents with a chief complaint of meetastatic adenoCA to brain s/p resection 22 (18 Aug 2022 12:19)      HPI:  Patient is a 83 YO female RH dominant with PMHx HTN, DM, HLD, CAD, MI, PAD, peripheral neuropathy, former smoker, who presented to [facility] ED on 22 with chronic back pain and gait/balance issues x 4 months. She was found to have a brain mass on imaging while undergoing work up by her orthopedist (Dr. Castro).    MR brain 22 +Right temporal lobe peripherally enhancing, centrally necrotic 3.1 x 2.6 cm mass with surrounding vasogenic edema. 8 mm leftward midline shift. Right uncal herniation. Additional left parafalcine 1.3 x 0.9 cm rim-enhancing cystic lesion with mild surrounding vasogenic edema. Tiny 0.3 x 0.3cm rim-enhancing lesion within the left frontal cortex concerning for metastatic disease. CT chest showed an obstructing left upper lobe endobronchial lesion. She had bronchial biopsy, consistent with poorly differentiated adenocarcinoma 22. Patient underwent Right craniotomy for resection of brain mass 22, Pathology +NSC carcinoma (favor adeno). Plans for out-pt PET/CT scan, RT, and consideration for systemic therapy.     Post-op course significant for STEMI 22. Not a candidate for cath secondary to comorbidities and recent surgery. She was started on ASA/ heparin gtt and a betablocker. TTE  +normal LV internal dimensions with discrete upper septal hypertrophy. EF 55%, inferolateral wall is akinetic. She was found to have RML segmental PE 8./3. Hospital course also significant for hyperkalemia, and encephalopathy, likely secondary to UTI/early urosepsis (treated with 5 days of Zosyn). EEG +Right temporal sharp waves, no seizures; started on Briviact 8/3/22. She developed new onset Afib w RVR on 22. with elevated troponin; metoprolol was increased, and heparin anticoagulation switched to Eliquis on 22.    Patient was evaluated by PM&R and therapy for functional deficits, gait/ADL impairments and acute rehabilitation was recommended. Patient was medically optimized for discharge to [facility] IRF 8/10/22. (10 Aug 2022 13:45)      PAST MEDICAL & SURGICAL HISTORY:  Hypertension      Coronary artery disease      Hyperlipidemia      Peripheral artery disease      Myocardial infarct      Peripheral neuropathy      Diabetes      Chronic back pain      Former smoker      PAD (peripheral artery disease)      CAD (coronary artery disease)  stents          Allergies    adhesives (Pruritus)  No Known Drug Allergies    Intolerances          PHYSICAL EXAM    VITALS  82y  Vital Signs Last 24 Hrs  T(C): 36.3 (18 Aug 2022 09:21), Max: 36.8 (17 Aug 2022 19:30)  T(F): 97.4 (18 Aug 2022 09:21), Max: 98.3 (17 Aug 2022 19:30)  HR: 63 (18 Aug 2022 09:21) (63 - 91)  BP: 108/73 (18 Aug 2022 09:21) (108/73 - 129/88)  BP(mean): --  RR: 16 (18 Aug 2022 09:21) (16 - 16)  SpO2: 98% (18 Aug 2022 09:21) (98% - 98%)    Parameters below as of 18 Aug 2022 09:21  Patient On (Oxygen Delivery Method): room air      Daily     Daily         RECENT LABS:                          11.0   7.74  )-----------( 305      ( 18 Aug 2022 06:45 )             34.5     08-18    143  |  107  |  48<H>  ----------------------------<  118<H>  3.3<L>   |  27  |  1.27    Ca    8.5      18 Aug 2022 06:45          Urinalysis Basic - ( 17 Aug 2022 13:38 )    Color: Yellow / Appearance: Slightly Turbid / S.015 / pH: x  Gluc: x / Ketone: Trace  / Bili: Negative / Urobili: Negative   Blood: x / Protein: 15 / Nitrite: Negative   Leuk Esterase: Moderate / RBC: Negative /HPF / WBC >50 /HPF   Sq Epi: x / Non Sq Epi: Neg.-Few / Bacteria: Many /HPF          CAPILLARY BLOOD GLUCOSE      POCT Blood Glucose.: 76 mg/dL (18 Aug 2022 12:33)  POCT Blood Glucose.: 97 mg/dL (18 Aug 2022 08:24)  POCT Blood Glucose.: 98 mg/dL (17 Aug 2022 22:04)  POCT Blood Glucose.: 142 mg/dL (17 Aug 2022 17:11)      MEDICATIONS  (STANDING):  artificial  tears Solution 1 Drop(s) Both EYES three times a day  atorvastatin 40 milliGRAM(s) Oral at bedtime  brivaracetam 50 milliGRAM(s) Oral every 12 hours  ciprofloxacin     Tablet 500 milliGRAM(s) Oral every 12 hours  dextrose 5%. 1000 milliLiter(s) (100 mL/Hr) IV Continuous <Continuous>  dextrose 5%. 1000 milliLiter(s) (50 mL/Hr) IV Continuous <Continuous>  dextrose 50% Injectable 25 Gram(s) IV Push once  dextrose 50% Injectable 12.5 Gram(s) IV Push once  dextrose 50% Injectable 25 Gram(s) IV Push once  diVALproex  milliGRAM(s) Oral every 8 hours  enoxaparin Injectable 40 milliGRAM(s) SubCutaneous <User Schedule>  gabapentin 200 milliGRAM(s) Oral at bedtime  glucagon  Injectable 1 milliGRAM(s) IntraMuscular once  insulin glargine Injectable (LANTUS) 12 Unit(s) SubCutaneous at bedtime  insulin lispro (ADMELOG) corrective regimen sliding scale   SubCutaneous three times a day before meals  insulin lispro (ADMELOG) corrective regimen sliding scale   SubCutaneous at bedtime  insulin lispro Injectable (ADMELOG) 7 Unit(s) SubCutaneous three times a day before meals  melatonin 6 milliGRAM(s) Oral at bedtime  metoprolol tartrate 25 milliGRAM(s) Oral two times a day  pantoprazole    Tablet 40 milliGRAM(s) Oral before breakfast  polyethylene glycol 3350 17 Gram(s) Oral daily  potassium chloride    Tablet ER 20 milliEquivalent(s) Oral every 2 hours  senna 2 Tablet(s) Oral at bedtime  sodium chloride 0.9%. 1000 milliLiter(s) (60 mL/Hr) IV Continuous <Continuous>  zinc oxide 40% Paste 1 Application(s) Topical two times a day    MEDICATIONS  (PRN):  acetaminophen     Tablet .. 650 milliGRAM(s) Oral every 6 hours PRN Temp greater or equal to 38C (100.4F), Mild Pain (1 - 3)  bisacodyl Oral Tab/Cap - Peds 5 milliGRAM(s) Oral daily PRN Constipation  dextrose Oral Gel 15 Gram(s) Oral once PRN Blood Glucose LESS THAN 70 milliGRAM(s)/deciliter      Review of Systems:   · Additional ROS	Patient in NAD and resting comfortably in bed. Last BM yesterday. Patient is currently on NS@60cc/h with last Cr slightly higher at 1.27 but BP improved. Primafit placed last night     Denies dysuria but does complain of some burning sensation when urinating yesterday. Denies HA, N/V, sob, dyspnea, CP, dizziness or confusion. Tolerating therapies well without any other complaints.  Showered this morning      Physical Exam:   · Constitutional Comments	alert O x 2-3   right cranial incision C/D/I  · Respiratory	clear to auscultation bilaterally; no wheezes; no rales; no rhonchi; good- effort  · Cardiovascular	regular rate and rhythm; S1 S2 present; no gallops; no rub; no murmur  · Gastrointestinal	soft; nontender; nondistended; normal active bowel sounds  · Musculoskeletal	no calf tenderness no erythema or warmth  · Musculoskeletal Comments	bilateral HF 4/5 quad 4+/5 ankle PF and DF 5/5 normal tone

## 2022-08-18 NOTE — PROVIDER CONTACT NOTE (OTHER) - ASSESSMENT
Temp: 97.6  HR   : 85  BP    : 118/78  RR    : 15  SpO2:  99
Patient denies any complaints of dizziness or lightheadedness.

## 2022-08-18 NOTE — PROGRESS NOTE ADULT - SUBJECTIVE AND OBJECTIVE BOX
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 No acute events overnight.  Pt denies cp, palpitations, sob, abd pain, N/V, fever, chills.    ROS:  All other review of systems negative    Allergies    adhesives (Pruritus)  No Known Drug Allergies    Intolerances        MEDICATIONS  (STANDING):  artificial  tears Solution 1 Drop(s) Both EYES three times a day  atorvastatin 40 milliGRAM(s) Oral at bedtime  brivaracetam 50 milliGRAM(s) Oral every 12 hours  ciprofloxacin     Tablet 500 milliGRAM(s) Oral every 12 hours  dextrose 5%. 1000 milliLiter(s) (100 mL/Hr) IV Continuous <Continuous>  dextrose 5%. 1000 milliLiter(s) (50 mL/Hr) IV Continuous <Continuous>  dextrose 50% Injectable 25 Gram(s) IV Push once  dextrose 50% Injectable 12.5 Gram(s) IV Push once  dextrose 50% Injectable 25 Gram(s) IV Push once  diVALproex  milliGRAM(s) Oral every 8 hours  enoxaparin Injectable 40 milliGRAM(s) SubCutaneous <User Schedule>  gabapentin 200 milliGRAM(s) Oral at bedtime  glucagon  Injectable 1 milliGRAM(s) IntraMuscular once  insulin glargine Injectable (LANTUS) 12 Unit(s) SubCutaneous at bedtime  insulin lispro (ADMELOG) corrective regimen sliding scale   SubCutaneous three times a day before meals  insulin lispro (ADMELOG) corrective regimen sliding scale   SubCutaneous at bedtime  insulin lispro Injectable (ADMELOG) 7 Unit(s) SubCutaneous three times a day before meals  melatonin 6 milliGRAM(s) Oral at bedtime  metoprolol tartrate 25 milliGRAM(s) Oral two times a day  pantoprazole    Tablet 40 milliGRAM(s) Oral before breakfast  polyethylene glycol 3350 17 Gram(s) Oral daily  potassium chloride    Tablet ER 20 milliEquivalent(s) Oral every 2 hours  senna 2 Tablet(s) Oral at bedtime  sodium chloride 0.9%. 1000 milliLiter(s) (60 mL/Hr) IV Continuous <Continuous>  zinc oxide 40% Paste 1 Application(s) Topical two times a day    MEDICATIONS  (PRN):  acetaminophen     Tablet .. 650 milliGRAM(s) Oral every 6 hours PRN Temp greater or equal to 38C (100.4F), Mild Pain (1 - 3)  bisacodyl Oral Tab/Cap - Peds 5 milliGRAM(s) Oral daily PRN Constipation  dextrose Oral Gel 15 Gram(s) Oral once PRN Blood Glucose LESS THAN 70 milliGRAM(s)/deciliter      Vital Signs Last 24 Hrs  T(C): 36.3 (18 Aug 2022 09:21), Max: 36.8 (17 Aug 2022 19:30)  T(F): 97.4 (18 Aug 2022 09:21), Max: 98.3 (17 Aug 2022 19:30)  HR: 63 (18 Aug 2022 09:21) (63 - 91)  BP: 108/73 (18 Aug 2022 09:21) (108/73 - 129/88)  BP(mean): --  RR: 16 (18 Aug 2022 09:) (16 - 16)  SpO2: 98% (18 Aug 2022 09:21) (98% - 98%)    Parameters below as of 18 Aug 2022 09:21  Patient On (Oxygen Delivery Method): room air      CAPILLARY BLOOD GLUCOSE      POCT Blood Glucose.: 97 mg/dL (18 Aug 2022 08:24)  POCT Blood Glucose.: 98 mg/dL (17 Aug 2022 22:04)  POCT Blood Glucose.: 142 mg/dL (17 Aug 2022 17:11)    I&O's Summary      PHYSICAL EXAM:  GENERAL: NAD, well-developed female  HEAD:  Surgical incision c/d/i  EYES: EOMI, PERRLA, conjunctiva and sclera clear  NECK: Supple, No JVD  CHEST/LUNG: Clear to auscultation bilaterally; No wheeze, nonlabored breathing  HEART: Regular rate and rhythm; No murmurs, rubs, or gallops  ABDOMEN: Soft, Nontender, Nondistended; Bowel sounds present  EXTREMITIES:  2+ Peripheral Pulses, No clubbing, cyanosis, or edema  PSYCH: calm, appropriate mood  SKIN: + Forehead blister with fluid drained    LABS:                        11.0   7.74  )-----------( 305      ( 18 Aug 2022 06:45 )             34.5     08-18    143  |  107  |  48<H>  ----------------------------<  118<H>  3.3<L>   |  27  |  1.27    Ca    8.5      18 Aug 2022 06:45            Urinalysis Basic - ( 17 Aug 2022 13:38 )    Color: Yellow / Appearance: Slightly Turbid / S.015 / pH: x  Gluc: x / Ketone: Trace  / Bili: Negative / Urobili: Negative   Blood: x / Protein: 15 / Nitrite: Negative   Leuk Esterase: Moderate / RBC: Negative /HPF / WBC >50 /HPF   Sq Epi: x / Non Sq Epi: Neg.-Few / Bacteria: Many /HPF        RADIOLOGY & ADDITIONAL TESTS:  Results Reviewed:   Imaging Personally Reviewed:  Electrocardiogram Personally Reviewed:    COORDINATION OF CARE:  Care Discussed with Consultants/Other Providers [Y/N]:  Prior or Outpatient Records Reviewed [Y/N]:

## 2022-08-19 LAB
ANION GAP SERPL CALC-SCNC: 8 MMOL/L — SIGNIFICANT CHANGE UP (ref 5–17)
BUN SERPL-MCNC: 35 MG/DL — HIGH (ref 7–23)
CALCIUM SERPL-MCNC: 8.2 MG/DL — LOW (ref 8.4–10.5)
CHLORIDE SERPL-SCNC: 109 MMOL/L — HIGH (ref 96–108)
CO2 SERPL-SCNC: 26 MMOL/L — SIGNIFICANT CHANGE UP (ref 22–31)
CREAT SERPL-MCNC: 1.12 MG/DL — SIGNIFICANT CHANGE UP (ref 0.5–1.3)
EGFR: 49 ML/MIN/1.73M2 — LOW
GLUCOSE BLDC GLUCOMTR-MCNC: 109 MG/DL — HIGH (ref 70–99)
GLUCOSE BLDC GLUCOMTR-MCNC: 150 MG/DL — HIGH (ref 70–99)
GLUCOSE BLDC GLUCOMTR-MCNC: 162 MG/DL — HIGH (ref 70–99)
GLUCOSE BLDC GLUCOMTR-MCNC: 226 MG/DL — HIGH (ref 70–99)
GLUCOSE SERPL-MCNC: 128 MG/DL — HIGH (ref 70–99)
POTASSIUM SERPL-MCNC: 3.6 MMOL/L — SIGNIFICANT CHANGE UP (ref 3.5–5.3)
POTASSIUM SERPL-SCNC: 3.6 MMOL/L — SIGNIFICANT CHANGE UP (ref 3.5–5.3)
SODIUM SERPL-SCNC: 143 MMOL/L — SIGNIFICANT CHANGE UP (ref 135–145)

## 2022-08-19 PROCEDURE — 99232 SBSQ HOSP IP/OBS MODERATE 35: CPT

## 2022-08-19 RX ADMIN — BRIVARACETAM 50 MILLIGRAM(S): 25 TABLET, FILM COATED ORAL at 18:22

## 2022-08-19 RX ADMIN — Medication 1 DROP(S): at 06:24

## 2022-08-19 RX ADMIN — Medication 6 MILLIGRAM(S): at 20:22

## 2022-08-19 RX ADMIN — ZINC OXIDE 1 APPLICATION(S): 200 OINTMENT TOPICAL at 17:05

## 2022-08-19 RX ADMIN — Medication 1 DROP(S): at 20:23

## 2022-08-19 RX ADMIN — Medication 7 UNIT(S): at 08:26

## 2022-08-19 RX ADMIN — Medication 25 MILLIGRAM(S): at 06:23

## 2022-08-19 RX ADMIN — Medication 2: at 08:26

## 2022-08-19 RX ADMIN — SENNA PLUS 2 TABLET(S): 8.6 TABLET ORAL at 20:22

## 2022-08-19 RX ADMIN — Medication 4: at 17:04

## 2022-08-19 RX ADMIN — Medication 7 UNIT(S): at 12:06

## 2022-08-19 RX ADMIN — DIVALPROEX SODIUM 500 MILLIGRAM(S): 500 TABLET, DELAYED RELEASE ORAL at 20:22

## 2022-08-19 RX ADMIN — GABAPENTIN 200 MILLIGRAM(S): 400 CAPSULE ORAL at 20:22

## 2022-08-19 RX ADMIN — Medication 500 MILLIGRAM(S): at 18:21

## 2022-08-19 RX ADMIN — ENOXAPARIN SODIUM 40 MILLIGRAM(S): 100 INJECTION SUBCUTANEOUS at 18:26

## 2022-08-19 RX ADMIN — BRIVARACETAM 50 MILLIGRAM(S): 25 TABLET, FILM COATED ORAL at 06:24

## 2022-08-19 RX ADMIN — Medication 500 MILLIGRAM(S): at 06:24

## 2022-08-19 RX ADMIN — INSULIN GLARGINE 12 UNIT(S): 100 INJECTION, SOLUTION SUBCUTANEOUS at 20:22

## 2022-08-19 RX ADMIN — ATORVASTATIN CALCIUM 40 MILLIGRAM(S): 80 TABLET, FILM COATED ORAL at 20:22

## 2022-08-19 RX ADMIN — PANTOPRAZOLE SODIUM 40 MILLIGRAM(S): 20 TABLET, DELAYED RELEASE ORAL at 06:23

## 2022-08-19 RX ADMIN — Medication 1 DROP(S): at 13:23

## 2022-08-19 RX ADMIN — DIVALPROEX SODIUM 500 MILLIGRAM(S): 500 TABLET, DELAYED RELEASE ORAL at 13:22

## 2022-08-19 RX ADMIN — Medication 7 UNIT(S): at 17:04

## 2022-08-19 RX ADMIN — DIVALPROEX SODIUM 500 MILLIGRAM(S): 500 TABLET, DELAYED RELEASE ORAL at 06:23

## 2022-08-19 RX ADMIN — ZINC OXIDE 1 APPLICATION(S): 200 OINTMENT TOPICAL at 06:25

## 2022-08-19 RX ADMIN — Medication 25 MILLIGRAM(S): at 18:24

## 2022-08-19 NOTE — PROGRESS NOTE ADULT - SUBJECTIVE AND OBJECTIVE BOX
Patient is a 82y old  Female who presents with a chief complaint of meetastatic adenoCA to brain s/p resection 22 (19 Aug 2022 11:35)      HPI:  Patient is a 83 YO female RH dominant with PMHx HTN, DM, HLD, CAD, MI, PAD, peripheral neuropathy, former smoker, who presented to [facility] ED on 22 with chronic back pain and gait/balance issues x 4 months. She was found to have a brain mass on imaging while undergoing work up by her orthopedist (Dr. Castro).    MR brain 22 +Right temporal lobe peripherally enhancing, centrally necrotic 3.1 x 2.6 cm mass with surrounding vasogenic edema. 8 mm leftward midline shift. Right uncal herniation. Additional left parafalcine 1.3 x 0.9 cm rim-enhancing cystic lesion with mild surrounding vasogenic edema. Tiny 0.3 x 0.3cm rim-enhancing lesion within the left frontal cortex concerning for metastatic disease. CT chest showed an obstructing left upper lobe endobronchial lesion. She had bronchial biopsy, consistent with poorly differentiated adenocarcinoma 22. Patient underwent Right craniotomy for resection of brain mass 22, Pathology +NSC carcinoma (favor adeno). Plans for out-pt PET/CT scan, RT, and consideration for systemic therapy.     Post-op course significant for STEMI 22. Not a candidate for cath secondary to comorbidities and recent surgery. She was started on ASA/ heparin gtt and a betablocker. TTE  +normal LV internal dimensions with discrete upper septal hypertrophy. EF 55%, inferolateral wall is akinetic. She was found to have RML segmental PE 8./3. Hospital course also significant for hyperkalemia, and encephalopathy, likely secondary to UTI/early urosepsis (treated with 5 days of Zosyn). EEG +Right temporal sharp waves, no seizures; started on Briviact 8/3/22. She developed new onset Afib w RVR on 22. with elevated troponin; metoprolol was increased, and heparin anticoagulation switched to Eliquis on 22.    Patient was evaluated by PM&R and therapy for functional deficits, gait/ADL impairments and acute rehabilitation was recommended. Patient was medically optimized for discharge to [facility] IRF 8/10/22. (10 Aug 2022 13:45)      PAST MEDICAL & SURGICAL HISTORY:  Hypertension      Coronary artery disease      Hyperlipidemia      Peripheral artery disease      Myocardial infarct      Peripheral neuropathy      Diabetes      Chronic back pain      Former smoker      PAD (peripheral artery disease)      CAD (coronary artery disease)  stents          MEDICATIONS  (STANDING):  artificial  tears Solution 1 Drop(s) Both EYES three times a day  atorvastatin 40 milliGRAM(s) Oral at bedtime  brivaracetam 50 milliGRAM(s) Oral every 12 hours  ciprofloxacin     Tablet 500 milliGRAM(s) Oral every 12 hours  dextrose 5%. 1000 milliLiter(s) (100 mL/Hr) IV Continuous <Continuous>  dextrose 5%. 1000 milliLiter(s) (50 mL/Hr) IV Continuous <Continuous>  dextrose 50% Injectable 25 Gram(s) IV Push once  dextrose 50% Injectable 12.5 Gram(s) IV Push once  dextrose 50% Injectable 25 Gram(s) IV Push once  diVALproex  milliGRAM(s) Oral every 8 hours  enoxaparin Injectable 40 milliGRAM(s) SubCutaneous <User Schedule>  gabapentin 200 milliGRAM(s) Oral at bedtime  glucagon  Injectable 1 milliGRAM(s) IntraMuscular once  insulin glargine Injectable (LANTUS) 12 Unit(s) SubCutaneous at bedtime  insulin lispro (ADMELOG) corrective regimen sliding scale   SubCutaneous three times a day before meals  insulin lispro (ADMELOG) corrective regimen sliding scale   SubCutaneous at bedtime  insulin lispro Injectable (ADMELOG) 7 Unit(s) SubCutaneous three times a day before meals  melatonin 6 milliGRAM(s) Oral at bedtime  metoprolol tartrate 25 milliGRAM(s) Oral two times a day  pantoprazole    Tablet 40 milliGRAM(s) Oral before breakfast  polyethylene glycol 3350 17 Gram(s) Oral daily  senna 2 Tablet(s) Oral at bedtime  sodium chloride 0.9%. 1000 milliLiter(s) (60 mL/Hr) IV Continuous <Continuous>  zinc oxide 40% Paste 1 Application(s) Topical two times a day    MEDICATIONS  (PRN):  acetaminophen     Tablet .. 650 milliGRAM(s) Oral every 6 hours PRN Temp greater or equal to 38C (100.4F), Mild Pain (1 - 3)  bisacodyl Oral Tab/Cap - Peds 5 milliGRAM(s) Oral daily PRN Constipation  dextrose Oral Gel 15 Gram(s) Oral once PRN Blood Glucose LESS THAN 70 milliGRAM(s)/deciliter      Allergies    adhesives (Pruritus)  No Known Drug Allergies    Intolerances          VITALS  82y  Vital Signs Last 24 Hrs  T(C): 36.5 (19 Aug 2022 09:52), Max: 36.5 (19 Aug 2022 09:52)  T(F): 97.7 (19 Aug 2022 09:52), Max: 97.7 (19 Aug 2022 09:52)  HR: 56 (19 Aug 2022 09:52) (56 - 85)  BP: 99/59 (19 Aug 2022 09:52) (99/59 - 122/74)  BP(mean): --  RR: 16 (19 Aug 2022 09:52) (15 - 16)  SpO2: 97% (19 Aug 2022 09:52) (97% - 99%)    Parameters below as of 19 Aug 2022 09:52  Patient On (Oxygen Delivery Method): room air      Daily     Daily Weight in k.6 (19 Aug 2022 06:26)        RECENT LABS:                          11.0   7.74  )-----------( 305      ( 18 Aug 2022 06:45 )             34.5     08-19    143  |  109<H>  |  35<H>  ----------------------------<  128<H>  3.6   |  26  |  1.12    Ca    8.2<L>      19 Aug 2022 05:30              Culture - Urine (collected 22 @ 17:05)  Source: Clean Catch Clean Catch (Midstream)  Preliminary Report (22 @ 12:48):    >100,000 CFU/ml Klebsiella pneumoniae    <10,000 CFU/ml Normal Urogenital anju present        CAPILLARY BLOOD GLUCOSE      POCT Blood Glucose.: 150 mg/dL (19 Aug 2022 12:05)  POCT Blood Glucose.: 162 mg/dL (19 Aug 2022 08:16)  POCT Blood Glucose.: 72 mg/dL (18 Aug 2022 21:42)  POCT Blood Glucose.: 111 mg/dL (18 Aug 2022 17:17)        Review of Systems:   · Additional ROS	Patient seen in OT. Appears fatigued today; she states that the burning on urination has improved. IV infiltrated, Bun/Cr improved although not at baseline. Importance of po hydration reinforced as patient decline new IV; if worsens tomorrow, may need new one, discussed    afebrile. Has some anxiety and sad mood regarding diagnosis and future Rx    Physical Exam:   · Constitutional Comments	fatigued but sustained eye opening  reduced recall and comprehension/problem solving   right cranial incision C/D/I  · Respiratory	clear to auscultation bilaterally; no wheezes; no rales; no rhonchi; good- effort  · Cardiovascular	regular rate and rhythm; S1 S2 present; no gallops; no rub; no murmur  · Gastrointestinal	soft; nontender; nondistended; normal active bowel sounds  · Musculoskeletal	no calf tenderness no erythema or warmth  · Musculoskeletal Comments	bilateral HF 4/5 quad 4+/5 ankle PF and DF 5/5 normal tone

## 2022-08-19 NOTE — PROGRESS NOTE ADULT - ASSESSMENT
82 year old F PMH HTN, DM, HLD, CAD, MI, PAD, peripheral neuropathy, former smoker presented to [facility] ED on 7/19/22 with brain mass s/p Right craniotomy/resection 7/27/22. 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, primary lung  #ADL impairement  - MR brain 7/20/22 with Right temporal lobe peripherally enhancing, centrally necrotic 3.1 x 2.6 cm mass with surrounding vasogenic edema with METS  - s/p right craniotomy for resection of brain mass 7/27/22  - Bronchial biopsy consistent with poorly differentiated adenocarcinoma 7/20/22  - continue with Brivaracetam 50mg BID and decadron taper  - continue depakote 500mg q8hrs for seizure ppx  - will need outpatient PET/CT scan, radiation therapy and further evaluation with heme/onc    #PERCY  - Resolved  - encourage PO fluid intake    #orthostatic  - orthostatic in PT, possibly from deconditioning and multiple BP meds  - Off antihypertensives losartan and amlodipine   - encourage PO intake  - continue to monitor     #leukocytosis  - Resolved    #CAD  #STEMI  #new onset afib  #HTN  #HLD  - echo 8/8/22 with EF 60-65%, basal inferolateral wall, the basal inferior wall, the basal anterolateral wall, and the mid inferior wall are hypokinetic  - not candidate for PCI given risks > benefit as per cath team due to recent craniotomy   - continue asa 81mg and lopressor 25mg BID, atorvastatin 40mg daily   - continue eliquis 5mg BID  - Off antihypertensives losartan and amlodipine     #DM type 2  -Hypoglycemic episodes  - HbA1C 8.7  -Lantus 12U and continue admelog 7U TID qAC    #history of PE  - found to have RML segmental PE on 8/3  - continue Eliquis 5mg BID    #anemia  - H/H stable  - anemia of chronic disease due to malignancy as per heme    #DVT ppx  - on Eliquis

## 2022-08-19 NOTE — PROGRESS NOTE ADULT - SUBJECTIVE AND OBJECTIVE BOX
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 No acute events overnight.  Pt denies cp, palpitations, sob, abd pain, N/V, fever, chills.    ROS:  All other review of systems negative    Allergies    adhesives (Pruritus)  No Known Drug Allergies    Intolerances        MEDICATIONS  (STANDING):  artificial  tears Solution 1 Drop(s) Both EYES three times a day  atorvastatin 40 milliGRAM(s) Oral at bedtime  brivaracetam 50 milliGRAM(s) Oral every 12 hours  ciprofloxacin     Tablet 500 milliGRAM(s) Oral every 12 hours  dextrose 5%. 1000 milliLiter(s) (100 mL/Hr) IV Continuous <Continuous>  dextrose 5%. 1000 milliLiter(s) (50 mL/Hr) IV Continuous <Continuous>  dextrose 50% Injectable 25 Gram(s) IV Push once  dextrose 50% Injectable 12.5 Gram(s) IV Push once  dextrose 50% Injectable 25 Gram(s) IV Push once  diVALproex  milliGRAM(s) Oral every 8 hours  enoxaparin Injectable 40 milliGRAM(s) SubCutaneous <User Schedule>  gabapentin 200 milliGRAM(s) Oral at bedtime  glucagon  Injectable 1 milliGRAM(s) IntraMuscular once  insulin glargine Injectable (LANTUS) 12 Unit(s) SubCutaneous at bedtime  insulin lispro (ADMELOG) corrective regimen sliding scale   SubCutaneous three times a day before meals  insulin lispro (ADMELOG) corrective regimen sliding scale   SubCutaneous at bedtime  insulin lispro Injectable (ADMELOG) 7 Unit(s) SubCutaneous three times a day before meals  melatonin 6 milliGRAM(s) Oral at bedtime  metoprolol tartrate 25 milliGRAM(s) Oral two times a day  pantoprazole    Tablet 40 milliGRAM(s) Oral before breakfast  polyethylene glycol 3350 17 Gram(s) Oral daily  senna 2 Tablet(s) Oral at bedtime  sodium chloride 0.9%. 1000 milliLiter(s) (60 mL/Hr) IV Continuous <Continuous>  zinc oxide 40% Paste 1 Application(s) Topical two times a day    MEDICATIONS  (PRN):  acetaminophen     Tablet .. 650 milliGRAM(s) Oral every 6 hours PRN Temp greater or equal to 38C (100.4F), Mild Pain (1 - 3)  bisacodyl Oral Tab/Cap - Peds 5 milliGRAM(s) Oral daily PRN Constipation  dextrose Oral Gel 15 Gram(s) Oral once PRN Blood Glucose LESS THAN 70 milliGRAM(s)/deciliter      Vital Signs Last 24 Hrs  T(C): 36.5 (19 Aug 2022 09:52), Max: 36.5 (19 Aug 2022 09:52)  T(F): 97.7 (19 Aug 2022 09:52), Max: 97.7 (19 Aug 2022 09:52)  HR: 56 (19 Aug 2022 09:52) (56 - 85)  BP: 99/59 (19 Aug 2022 09:52) (99/59 - 122/74)  BP(mean): --  RR: 16 (19 Aug 2022 09:52) (15 - 16)  SpO2: 97% (19 Aug 2022 09:52) (97% - 99%)    Parameters below as of 19 Aug 2022 09:52  Patient On (Oxygen Delivery Method): room air      CAPILLARY BLOOD GLUCOSE      POCT Blood Glucose.: 162 mg/dL (19 Aug 2022 08:16)  POCT Blood Glucose.: 72 mg/dL (18 Aug 2022 21:42)  POCT Blood Glucose.: 111 mg/dL (18 Aug 2022 17:17)  POCT Blood Glucose.: 76 mg/dL (18 Aug 2022 12:33)    I&O's Summary      PHYSICAL EXAM:  GENERAL: NAD, well-developed female  HEAD:  Surgical incision c/d/i  NECK: Supple, No JVD  CHEST/LUNG: Clear to auscultation bilaterally; No wheeze, nonlabored breathing  HEART: Regular rate and rhythm; No murmurs, rubs, or gallops  ABDOMEN: Soft, Nontender, Nondistended; Bowel sounds present  EXTREMITIES:  2+ Peripheral Pulses, No clubbing, cyanosis, or edema  PSYCH: calm, appropriate mood  SKIN: + Forehead blister with fluid drained    LABS:                        11.0   7.74  )-----------( 305      ( 18 Aug 2022 06:45 )             34.5     08-19    143  |  109<H>  |  35<H>  ----------------------------<  128<H>  3.6   |  26  |  1.12    Ca    8.2<L>      19 Aug 2022 05:30            Urinalysis Basic - ( 17 Aug 2022 13:38 )    Color: Yellow / Appearance: Slightly Turbid / S.015 / pH: x  Gluc: x / Ketone: Trace  / Bili: Negative / Urobili: Negative   Blood: x / Protein: 15 / Nitrite: Negative   Leuk Esterase: Moderate / RBC: Negative /HPF / WBC >50 /HPF   Sq Epi: x / Non Sq Epi: Neg.-Few / Bacteria: Many /HPF        RADIOLOGY & ADDITIONAL TESTS:  Results Reviewed:   Imaging Personally Reviewed:  Electrocardiogram Personally Reviewed:    COORDINATION OF CARE:  Care Discussed with Consultants/Other Providers [Y/N]:  Prior or Outpatient Records Reviewed [Y/N]:

## 2022-08-19 NOTE — PROGRESS NOTE ADULT - ASSESSMENT
Patient is a 81 YO female with PMHx HTN, DM, HLD, CAD, MI, PAD, peripheral neuropathy, former smoker, who presented to [facility] ED on 7/19/22 with brain mass s/p Right craniotomy/resection 7/27/22, Pathology +NSC carcinoma. Post-op course significant for STEMI, hyperkalemia, encephalopathy 2/2 UTI, new onset a-fib.    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 Recommend increase to full dose of lovenox 1mg/kg BID once surgically feasible.    - continue Comprehensive Rehab Program: PT/OT/ST, 3hours daily and 5 days weekly  - recreation therapy. Add psychology supportive services as patient with anxiety about diagnosis and future Rx  - Precautions: cardiac, DM, fall, sensory, SZ, AMS, aspiration, AC    # dry eyes  - artificial tears TID standing  - private ophthalmologist Dr. 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 Discussed with patient    # HTN/CAD/A-fib  - c/w ASA  - Eliquis 5mg BID  - Metoprolol 25mg BID  - Lipitor 40mg daily continues  - 99/59 - 122/74 8/19    # DMII  - a1c 8.7 on 7/20  - ISS and FS  - lantus / admleog    # leukocytosis: resolved  - likely due to steroids, UTI  - WBC 7.7 8/18   - CBC 8/22    # RI  - s/p IVF NS. IV infiltrated, patient declines IVF today  - encourage fluids  - BUn/Cr 48/1.27 8/18--> 35/1.12 8/19  - BMP 8/20. May need IVF if worsens, discussed with patient    # UTI  - UA +. many bacteria mod LE WBC >50  - urine Cx pending >100k GNR 8/19  - cipro 500 mg q12 x 3 days, discussed with hospitalist and pharmacy (macrobid dc due to renal function)  - bladder scan q8    # Thyroid Nodule  - f/u with Endocrinology on dc    # PE  - Eliquis 5mg BID    # Pain management  - Tylenol PRN  - gabapentin    # GI ppx  - Protonix 40mg  - Senna, miralax PRN    # FEN   - Diet: Consistent Carbohydrate Diabetic     # Skin:  - Skin on admission: stage 1 pressure ulcer on LLE lateral heel and right buttock, cranial staples c/d/i. Forehead open blister area with granulation tissue  - Pressure injury/Skin: Turn Q2hrs while in bed, OOB to Chair, PT/OT     # DVT ppx  - eliquis    # Case discussed in IDT rounds 8/15.  - For tentative dc date home 8/24 with caregiver support during waking hours at supervision/CG level, and home PT, OT SLP  - will need caregiver training  - case discussed with daughter Suzan [phone] 8/17    # LABS  CBC BMP 8/22  urine Cx  BMp 8/20

## 2022-08-20 LAB
-  AMIKACIN: SIGNIFICANT CHANGE UP
-  AMOXICILLIN/CLAVULANIC ACID: SIGNIFICANT CHANGE UP
-  AMPICILLIN/SULBACTAM: SIGNIFICANT CHANGE UP
-  AMPICILLIN: SIGNIFICANT CHANGE UP
-  AZTREONAM: SIGNIFICANT CHANGE UP
-  CEFAZOLIN: SIGNIFICANT CHANGE UP
-  CEFEPIME: SIGNIFICANT CHANGE UP
-  CEFOXITIN: SIGNIFICANT CHANGE UP
-  CEFTRIAXONE: SIGNIFICANT CHANGE UP
-  CIPROFLOXACIN: SIGNIFICANT CHANGE UP
-  ERTAPENEM: SIGNIFICANT CHANGE UP
-  GENTAMICIN: SIGNIFICANT CHANGE UP
-  IMIPENEM: SIGNIFICANT CHANGE UP
-  LEVOFLOXACIN: SIGNIFICANT CHANGE UP
-  MEROPENEM: SIGNIFICANT CHANGE UP
-  NITROFURANTOIN: SIGNIFICANT CHANGE UP
-  PIPERACILLIN/TAZOBACTAM: SIGNIFICANT CHANGE UP
-  TIGECYCLINE: SIGNIFICANT CHANGE UP
-  TOBRAMYCIN: SIGNIFICANT CHANGE UP
-  TRIMETHOPRIM/SULFAMETHOXAZOLE: SIGNIFICANT CHANGE UP
CULTURE RESULTS: SIGNIFICANT CHANGE UP
CULTURE RESULTS: SIGNIFICANT CHANGE UP
GLUCOSE BLDC GLUCOMTR-MCNC: 112 MG/DL — HIGH (ref 70–99)
GLUCOSE BLDC GLUCOMTR-MCNC: 119 MG/DL — HIGH (ref 70–99)
GLUCOSE BLDC GLUCOMTR-MCNC: 121 MG/DL — HIGH (ref 70–99)
GLUCOSE BLDC GLUCOMTR-MCNC: 141 MG/DL — HIGH (ref 70–99)
METHOD TYPE: SIGNIFICANT CHANGE UP
ORGANISM # SPEC MICROSCOPIC CNT: SIGNIFICANT CHANGE UP
ORGANISM # SPEC MICROSCOPIC CNT: SIGNIFICANT CHANGE UP
SPECIMEN SOURCE: SIGNIFICANT CHANGE UP
SPECIMEN SOURCE: SIGNIFICANT CHANGE UP

## 2022-08-20 PROCEDURE — 99232 SBSQ HOSP IP/OBS MODERATE 35: CPT

## 2022-08-20 RX ADMIN — POLYETHYLENE GLYCOL 3350 17 GRAM(S): 17 POWDER, FOR SOLUTION ORAL at 13:12

## 2022-08-20 RX ADMIN — ZINC OXIDE 1 APPLICATION(S): 200 OINTMENT TOPICAL at 18:12

## 2022-08-20 RX ADMIN — ENOXAPARIN SODIUM 40 MILLIGRAM(S): 100 INJECTION SUBCUTANEOUS at 18:12

## 2022-08-20 RX ADMIN — INSULIN GLARGINE 12 UNIT(S): 100 INJECTION, SOLUTION SUBCUTANEOUS at 21:23

## 2022-08-20 RX ADMIN — GABAPENTIN 200 MILLIGRAM(S): 400 CAPSULE ORAL at 21:22

## 2022-08-20 RX ADMIN — BRIVARACETAM 50 MILLIGRAM(S): 25 TABLET, FILM COATED ORAL at 18:12

## 2022-08-20 RX ADMIN — DIVALPROEX SODIUM 500 MILLIGRAM(S): 500 TABLET, DELAYED RELEASE ORAL at 06:02

## 2022-08-20 RX ADMIN — ATORVASTATIN CALCIUM 40 MILLIGRAM(S): 80 TABLET, FILM COATED ORAL at 21:23

## 2022-08-20 RX ADMIN — Medication 7 UNIT(S): at 08:09

## 2022-08-20 RX ADMIN — SENNA PLUS 2 TABLET(S): 8.6 TABLET ORAL at 21:22

## 2022-08-20 RX ADMIN — DIVALPROEX SODIUM 500 MILLIGRAM(S): 500 TABLET, DELAYED RELEASE ORAL at 21:23

## 2022-08-20 RX ADMIN — Medication 6 MILLIGRAM(S): at 21:22

## 2022-08-20 RX ADMIN — Medication 500 MILLIGRAM(S): at 06:05

## 2022-08-20 RX ADMIN — Medication 1 DROP(S): at 06:02

## 2022-08-20 RX ADMIN — Medication 7 UNIT(S): at 17:11

## 2022-08-20 RX ADMIN — BRIVARACETAM 50 MILLIGRAM(S): 25 TABLET, FILM COATED ORAL at 06:02

## 2022-08-20 RX ADMIN — DIVALPROEX SODIUM 500 MILLIGRAM(S): 500 TABLET, DELAYED RELEASE ORAL at 13:12

## 2022-08-20 RX ADMIN — Medication 1 DROP(S): at 13:12

## 2022-08-20 RX ADMIN — Medication 1 DROP(S): at 21:23

## 2022-08-20 RX ADMIN — PANTOPRAZOLE SODIUM 40 MILLIGRAM(S): 20 TABLET, DELAYED RELEASE ORAL at 06:02

## 2022-08-20 RX ADMIN — ZINC OXIDE 1 APPLICATION(S): 200 OINTMENT TOPICAL at 06:06

## 2022-08-20 RX ADMIN — Medication 7 UNIT(S): at 12:17

## 2022-08-20 NOTE — PROGRESS NOTE ADULT - SUBJECTIVE AND OBJECTIVE BOX
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 She was found to have a brain mass on imaging while undergoing work up by her orthopedist (Dr. Castro).    MR brain 7/20/22 +Right temporal lobe peripherally enhancing, centrally necrotic 3.1 x 2.6 cm mass with surrounding vasogenic edema. 8 mm leftward midline shift. Right uncal herniation. Additional left parafalcine 1.3 x 0.9 cm rim-enhancing cystic lesion with mild surrounding vasogenic edema. Tiny 0.3 x 0.3cm rim-enhancing lesion within the left frontal cortex concerning for metastatic disease. CT chest showed an obstructing left upper lobe endobronchial lesion. 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 Patient underwent Right craniotomy for resection of brain mass 7/27/22, Pathology +NSC carcinoma (favor adeno). Plans for out-pt PET/CT scan, RT, and consideration for systemic therapy.     Post-op course significant for STEMI 8/1/22. Not a candidate for cath secondary to comorbidities and recent surgery. She was started on ASA/ heparin gtt and a betablocker. TTE 7/22 +normal LV internal dimensions with discrete upper septal hypertrophy. EF 55%, inferolateral wall is akinetic. She was found to have RML segmental PE 8./3. Hospital course also significant for hyperkalemia, and encephalopathy, likely secondary to UTI/early urosepsis (treated with 5 days of Zosyn). EEG +Right temporal sharp waves, no seizures; started on Briviact 8/3/22. She developed new onset Afib w RVR on 8/4/22. with elevated troponin; metoprolol was increased, and heparin anticoagulation switched to Eliquis on 8/9/22.    Patient was evaluated by PM&R and therapy for functional deficits, gait/ADL impairments and acute rehabilitation was recommended. Patient was medically optimized for discharge to [facility] IRF 8/10/22. (10 Aug 2022 13:45)      PAST MEDICAL & SURGICAL HISTORY:  Hypertension      Coronary artery disease      Hyperlipidemia      Peripheral artery disease      Myocardial infarct      Peripheral neuropathy      Diabetes      Chronic back pain      Former smoker      PAD (peripheral artery disease)      CAD (coronary artery disease)  stents          MEDICATIONS  (STANDING):  artificial  tears Solution 1 Drop(s) Both EYES three times a day  atorvastatin 40 milliGRAM(s) Oral at bedtime  brivaracetam 50 milliGRAM(s) Oral every 12 hours  ciprofloxacin     Tablet 500 milliGRAM(s) Oral every 12 hours  dextrose 5%. 1000 milliLiter(s) (100 mL/Hr) IV Continuous <Continuous>  dextrose 5%. 1000 milliLiter(s) (50 mL/Hr) IV Continuous <Continuous>  dextrose 50% Injectable 25 Gram(s) IV Push once  dextrose 50% Injectable 12.5 Gram(s) IV Push once  dextrose 50% Injectable 25 Gram(s) IV Push once  diVALproex  milliGRAM(s) Oral every 8 hours  enoxaparin Injectable 40 milliGRAM(s) SubCutaneous <User Schedule>  gabapentin 200 milliGRAM(s) Oral at bedtime  glucagon  Injectable 1 milliGRAM(s) IntraMuscular once  insulin glargine Injectable (LANTUS) 12 Unit(s) SubCutaneous at bedtime  insulin lispro (ADMELOG) corrective regimen sliding scale   SubCutaneous three times a day before meals  insulin lispro (ADMELOG) corrective regimen sliding scale   SubCutaneous at bedtime  insulin lispro Injectable (ADMELOG) 7 Unit(s) SubCutaneous three times a day before meals  melatonin 6 milliGRAM(s) Oral at bedtime  metoprolol tartrate 25 milliGRAM(s) Oral two times a day  pantoprazole    Tablet 40 milliGRAM(s) Oral before breakfast  polyethylene glycol 3350 17 Gram(s) Oral daily  senna 2 Tablet(s) Oral at bedtime  sodium chloride 0.9%. 1000 milliLiter(s) (60 mL/Hr) IV Continuous <Continuous>  zinc oxide 40% Paste 1 Application(s) Topical two times a day    MEDICATIONS  (PRN):  acetaminophen     Tablet .. 650 milliGRAM(s) Oral every 6 hours PRN Temp greater or equal to 38C (100.4F), Mild Pain (1 - 3)  bisacodyl Oral Tab/Cap - Peds 5 milliGRAM(s) Oral daily PRN Constipation  dextrose Oral Gel 15 Gram(s) Oral once PRN Blood Glucose LESS THAN 70 milliGRAM(s)/deciliter      Allergies    adhesives (Pruritus)  No Known Drug Allergies    Intolerances      ROS/Subjective: Patient seen this morning at bedside. Patient in bed in NAD, no SOB, no n/v, no CP, no pain    VITALS  Vital Signs Last 24 Hrs  T(C): 36.5 (20 Aug 2022 07:35), Max: 36.6 (19 Aug 2022 20:15)  T(F): 97.7 (20 Aug 2022 07:35), Max: 97.8 (19 Aug 2022 20:15)  HR: 67 (20 Aug 2022 07:35) (64 - 74)  BP: 100/60 (20 Aug 2022 10:54) (95/58 - 121/79)  BP(mean): --  RR: 16 (20 Aug 2022 07:35) (15 - 16)  SpO2: 98% (20 Aug 2022 07:35) (98% - 100%)    Parameters below as of 20 Aug 2022 07:35  Patient On (Oxygen Delivery Method): room air      PHYSICAL EXAM:  GENERAL: NAD, well-developed female  HEAD:  Surgical incision c/d/i  NECK: Supple  CHEST/LUNG: Clear to auscultation bilaterally; No wheeze  HEART: Regular rate and rhythm  ABDOMEN: Soft, Nontender, Nondistended; Bowel sounds present  EXTREMITIES:  2+ Peripheral Pulses, No clubbing, cyanosis, or edema  PSYCH: calm, appropriate mood  Motor:  BUE: 5/5mp  BLE 5/5mp          RECENT LABS:  LABS:      Ca    8.2        19 Aug 2022 05:30                              11.0   7.74  )-----------( 305      ( 18 Aug 2022 06:45 )             34.5     08-19    143  |  109<H>  |  35<H>  ----------------------------<  128<H>  3.6   |  26  |  1.12    Ca    8.2<L>      19 Aug 2022 05:30              Culture - Urine (collected 08-17-22 @ 17:05)  Source: Clean Catch Clean Catch (Midstream)  Preliminary Report (08-19-22 @ 12:48):    >100,000 CFU/ml Klebsiella pneumoniae    <10,000 CFU/ml Normal Urogenital anju present        CAPILLARY BLOOD GLUCOSE      POCT Blood Glucose.: 150 mg/dL (19 Aug 2022 12:05)  POCT Blood Glucose.: 162 mg/dL (19 Aug 2022 08:16)  POCT Blood Glucose.: 72 mg/dL (18 Aug 2022 21:42)  POCT Blood Glucose.: 111 mg/dL (18 Aug 2022 17:17)

## 2022-08-20 NOTE — PROGRESS NOTE ADULT - ASSESSMENT
82 year old F PMH HTN, DM, HLD, CAD, MI, PAD, peripheral neuropathy, former smoker presented to [facility] ED on 7/19/22 with brain mass s/p Right craniotomy/resection 7/27/22. Hospital course complicated with STEMI and metabolic encephalopathy 2/2 UTI, new onset afib, now admitted to  rehab for ADL impairment    #Klebsiella UTI  -Completed 3 day course of Cipro  -Encourage PO hydration  -Monitor vitals    #metastatic adenocarcinoma to brain, primary lung  #ADL impairement  - MR brain 7/20/22 with Right temporal lobe peripherally enhancing, centrally necrotic 3.1 x 2.6 cm mass with surrounding vasogenic edema with METS  - s/p right craniotomy for resection of brain mass 7/27/22  - Bronchial biopsy consistent with poorly differentiated adenocarcinoma 7/20/22  - continue with Brivaracetam 50mg BID and decadron taper  - continue depakote 500mg q8hrs for seizure ppx  - will need outpatient PET/CT scan, radiation therapy and further evaluation with heme/onc    #PERCY  - Resolved  - encourage PO fluid intake    #orthostatic  - orthostatic in PT, possibly from deconditioning and multiple BP meds  - Off antihypertensives losartan and amlodipine   - encourage PO intake  - continue to monitor     #leukocytosis  - Resolved    #CAD  #STEMI  #new onset afib  #HTN  #HLD  - echo 8/8/22 with EF 60-65%, basal inferolateral wall, the basal inferior wall, the basal anterolateral wall, and the mid inferior wall are hypokinetic  - not candidate for PCI given risks > benefit as per cath team due to recent craniotomy   - continue asa 81mg and lopressor 25mg BID, atorvastatin 40mg daily   - continue eliquis 5mg BID  - Off antihypertensives losartan and amlodipine     #DM type 2  -Hypoglycemic episodes  - HbA1C 8.7  -Lantus 12U and continue admelog 7U TID qAC    #history of PE  - found to have RML segmental PE on 8/3  - continue Eliquis 5mg BID    #anemia  - H/H stable  - anemia of chronic disease due to malignancy as per heme    #DVT ppx  - on Eliquis

## 2022-08-20 NOTE — PROGRESS NOTE ADULT - ASSESSMENT
Patient is a 81 YO female with PMHx HTN, DM, HLD, CAD, MI, PAD, peripheral neuropathy, former smoker, who presented to [facility] ED on 7/19/22 with brain mass s/p Right craniotomy/resection 7/27/22, Pathology +NSC carcinoma. Post-op course significant for STEMI, hyperkalemia, encephalopathy 2/2 UTI, new onset a-fib.    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 Recommend increase to full dose of lovenox 1mg/kg BID once surgically feasible.    - continue Comprehensive Rehab Program: PT/OT/ST, 3hours daily and 5 days weekly  - recreation therapy. Add psychology supportive services as patient with anxiety about diagnosis and future Rx  - Precautions: cardiac, DM, fall, sensory, SZ, AMS, aspiration, AC    # dry eyes  - artificial tears TID standing  - private ophthalmologist Dr. 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 Discussed with patient    # HTN/CAD/A-fib  - c/w ASA  - Eliquis 5mg BID  - Metoprolol 25mg BID  - Lipitor 40mg daily continues  - 95/58 - 121/79 8/20    # DMII  - a1c 8.7 on 7/20  - ISS and FS  - lantus / admleog    # leukocytosis: resolved  - likely due to steroids, UTI  - WBC 7.7 8/18   - CBC 8/22    # RI  - encourage fluids  - BUn/Cr 48/1.27 8/18--> 35/1.12 8/19  - BMP 8/20 (P). May need IVF if worsens    # UTI  - UA +. many bacteria mod LE WBC >50  - urine Cx pending >100k GNR 8/19  - cipro 500 mg q12 x 3 days, discussed with hospitalist and pharmacy (macrobid dc due to renal function)  - bladder scan q8    # Thyroid Nodule  - f/u with Endocrinology on dc    # PE  - Eliquis 5mg BID    # Pain management  - Tylenol PRN  - gabapentin    # GI ppx  - Protonix 40mg  - Senna, miralax PRN    # FEN   - Diet: Consistent Carbohydrate Diabetic     # Skin:  - Skin on admission: stage 1 pressure ulcer on LLE lateral heel and right buttock, cranial staples c/d/i. Forehead open blister area with granulation tissue  - Pressure injury/Skin: Turn Q2hrs while in bed, OOB to Chair, PT/OT     # DVT ppx  - eliquis      # LABS  CBC BMP 8/22  urine Cx  BMp 8/20

## 2022-08-20 NOTE — PROGRESS NOTE ADULT - SUBJECTIVE AND OBJECTIVE BOX
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 No acute events overnight.  Pt denies cp, palpitations, sob, abd pain, N/V, fever, chills.    ROS:  All other review of systems negative    Allergies    adhesives (Pruritus)  No Known Drug Allergies    Intolerances        MEDICATIONS  (STANDING):  artificial  tears Solution 1 Drop(s) Both EYES three times a day  atorvastatin 40 milliGRAM(s) Oral at bedtime  brivaracetam 50 milliGRAM(s) Oral every 12 hours  dextrose 5%. 1000 milliLiter(s) (50 mL/Hr) IV Continuous <Continuous>  dextrose 5%. 1000 milliLiter(s) (100 mL/Hr) IV Continuous <Continuous>  dextrose 50% Injectable 25 Gram(s) IV Push once  dextrose 50% Injectable 12.5 Gram(s) IV Push once  dextrose 50% Injectable 25 Gram(s) IV Push once  diVALproex  milliGRAM(s) Oral every 8 hours  enoxaparin Injectable 40 milliGRAM(s) SubCutaneous <User Schedule>  gabapentin 200 milliGRAM(s) Oral at bedtime  glucagon  Injectable 1 milliGRAM(s) IntraMuscular once  insulin glargine Injectable (LANTUS) 12 Unit(s) SubCutaneous at bedtime  insulin lispro (ADMELOG) corrective regimen sliding scale   SubCutaneous three times a day before meals  insulin lispro (ADMELOG) corrective regimen sliding scale   SubCutaneous at bedtime  insulin lispro Injectable (ADMELOG) 7 Unit(s) SubCutaneous three times a day before meals  melatonin 6 milliGRAM(s) Oral at bedtime  metoprolol tartrate 25 milliGRAM(s) Oral two times a day  pantoprazole    Tablet 40 milliGRAM(s) Oral before breakfast  polyethylene glycol 3350 17 Gram(s) Oral daily  senna 2 Tablet(s) Oral at bedtime  zinc oxide 40% Paste 1 Application(s) Topical two times a day    MEDICATIONS  (PRN):  acetaminophen     Tablet .. 650 milliGRAM(s) Oral every 6 hours PRN Temp greater or equal to 38C (100.4F), Mild Pain (1 - 3)  bisacodyl Oral Tab/Cap - Peds 5 milliGRAM(s) Oral daily PRN Constipation  dextrose Oral Gel 15 Gram(s) Oral once PRN Blood Glucose LESS THAN 70 milliGRAM(s)/deciliter      Vital Signs Last 24 Hrs  T(C): 36.5 (20 Aug 2022 07:35), Max: 36.6 (19 Aug 2022 20:15)  T(F): 97.7 (20 Aug 2022 07:35), Max: 97.8 (19 Aug 2022 20:15)  HR: 67 (20 Aug 2022 07:35) (64 - 74)  BP: 95/58 (20 Aug 2022 07:35) (95/58 - 121/79)  BP(mean): --  RR: 16 (20 Aug 2022 07:35) (15 - 16)  SpO2: 98% (20 Aug 2022 07:35) (98% - 100%)    Parameters below as of 20 Aug 2022 07:35  Patient On (Oxygen Delivery Method): room air      CAPILLARY BLOOD GLUCOSE      POCT Blood Glucose.: 119 mg/dL (20 Aug 2022 07:56)  POCT Blood Glucose.: 109 mg/dL (19 Aug 2022 20:18)  POCT Blood Glucose.: 226 mg/dL (19 Aug 2022 17:02)  POCT Blood Glucose.: 150 mg/dL (19 Aug 2022 12:05)    I&O's Summary      PHYSICAL EXAM:  GENERAL: NAD, well-developed female  HEAD:  Surgical incision c/d/i  NECK: Supple, No JVD  CHEST/LUNG: Clear to auscultation bilaterally; No wheeze, nonlabored breathing  HEART: Regular rate and rhythm; No murmurs, rubs, or gallops  ABDOMEN: Soft, Nontender, Nondistended; Bowel sounds present  EXTREMITIES:  2+ Peripheral Pulses, No clubbing, cyanosis, or edema  PSYCH: calm, appropriate mood  SKIN: + Forehead blister with fluid drained    LABS:    08-19    143  |  109<H>  |  35<H>  ----------------------------<  128<H>  3.6   |  26  |  1.12    Ca    8.2<L>      19 Aug 2022 05:30                RADIOLOGY & ADDITIONAL TESTS:  Results Reviewed:   Imaging Personally Reviewed:  Electrocardiogram Personally Reviewed:    COORDINATION OF CARE:  Care Discussed with Consultants/Other Providers [Y/N]:  Prior or Outpatient Records Reviewed [Y/N]:

## 2022-08-21 LAB
ANION GAP SERPL CALC-SCNC: 12 MMOL/L — SIGNIFICANT CHANGE UP (ref 5–17)
APPEARANCE UR: CLEAR — SIGNIFICANT CHANGE UP
BILIRUB UR-MCNC: NEGATIVE — SIGNIFICANT CHANGE UP
BUN SERPL-MCNC: 31 MG/DL — HIGH (ref 7–23)
CALCIUM SERPL-MCNC: 8.2 MG/DL — LOW (ref 8.4–10.5)
CHLORIDE SERPL-SCNC: 107 MMOL/L — SIGNIFICANT CHANGE UP (ref 96–108)
CO2 SERPL-SCNC: 22 MMOL/L — SIGNIFICANT CHANGE UP (ref 22–31)
COLOR SPEC: YELLOW — SIGNIFICANT CHANGE UP
CREAT SERPL-MCNC: 1.09 MG/DL — SIGNIFICANT CHANGE UP (ref 0.5–1.3)
DIFF PNL FLD: NEGATIVE — SIGNIFICANT CHANGE UP
EGFR: 50 ML/MIN/1.73M2 — LOW
GLUCOSE BLDC GLUCOMTR-MCNC: 129 MG/DL — HIGH (ref 70–99)
GLUCOSE BLDC GLUCOMTR-MCNC: 162 MG/DL — HIGH (ref 70–99)
GLUCOSE BLDC GLUCOMTR-MCNC: 163 MG/DL — HIGH (ref 70–99)
GLUCOSE BLDC GLUCOMTR-MCNC: 193 MG/DL — HIGH (ref 70–99)
GLUCOSE SERPL-MCNC: 158 MG/DL — HIGH (ref 70–99)
GLUCOSE UR QL: NEGATIVE — SIGNIFICANT CHANGE UP
KETONES UR-MCNC: NEGATIVE — SIGNIFICANT CHANGE UP
LEUKOCYTE ESTERASE UR-ACNC: NEGATIVE — SIGNIFICANT CHANGE UP
NITRITE UR-MCNC: NEGATIVE — SIGNIFICANT CHANGE UP
PH UR: 6 — SIGNIFICANT CHANGE UP (ref 5–8)
POTASSIUM SERPL-MCNC: 4.3 MMOL/L — SIGNIFICANT CHANGE UP (ref 3.5–5.3)
POTASSIUM SERPL-SCNC: 4.3 MMOL/L — SIGNIFICANT CHANGE UP (ref 3.5–5.3)
PROT UR-MCNC: NEGATIVE — SIGNIFICANT CHANGE UP
SODIUM SERPL-SCNC: 141 MMOL/L — SIGNIFICANT CHANGE UP (ref 135–145)
SP GR SPEC: 1.02 — SIGNIFICANT CHANGE UP (ref 1.01–1.02)
UROBILINOGEN FLD QL: NEGATIVE — SIGNIFICANT CHANGE UP

## 2022-08-21 PROCEDURE — 99232 SBSQ HOSP IP/OBS MODERATE 35: CPT

## 2022-08-21 RX ADMIN — BRIVARACETAM 50 MILLIGRAM(S): 25 TABLET, FILM COATED ORAL at 17:56

## 2022-08-21 RX ADMIN — INSULIN GLARGINE 12 UNIT(S): 100 INJECTION, SOLUTION SUBCUTANEOUS at 21:06

## 2022-08-21 RX ADMIN — Medication 2: at 08:09

## 2022-08-21 RX ADMIN — PANTOPRAZOLE SODIUM 40 MILLIGRAM(S): 20 TABLET, DELAYED RELEASE ORAL at 05:53

## 2022-08-21 RX ADMIN — Medication 1 DROP(S): at 05:53

## 2022-08-21 RX ADMIN — Medication 25 MILLIGRAM(S): at 21:06

## 2022-08-21 RX ADMIN — Medication 1 DROP(S): at 21:06

## 2022-08-21 RX ADMIN — DIVALPROEX SODIUM 500 MILLIGRAM(S): 500 TABLET, DELAYED RELEASE ORAL at 21:07

## 2022-08-21 RX ADMIN — Medication 7 UNIT(S): at 12:00

## 2022-08-21 RX ADMIN — Medication 7 UNIT(S): at 17:13

## 2022-08-21 RX ADMIN — DIVALPROEX SODIUM 500 MILLIGRAM(S): 500 TABLET, DELAYED RELEASE ORAL at 14:56

## 2022-08-21 RX ADMIN — Medication 2: at 12:00

## 2022-08-21 RX ADMIN — SENNA PLUS 2 TABLET(S): 8.6 TABLET ORAL at 21:06

## 2022-08-21 RX ADMIN — ATORVASTATIN CALCIUM 40 MILLIGRAM(S): 80 TABLET, FILM COATED ORAL at 21:07

## 2022-08-21 RX ADMIN — Medication 6 MILLIGRAM(S): at 21:06

## 2022-08-21 RX ADMIN — Medication 1 DROP(S): at 14:57

## 2022-08-21 RX ADMIN — DIVALPROEX SODIUM 500 MILLIGRAM(S): 500 TABLET, DELAYED RELEASE ORAL at 05:53

## 2022-08-21 RX ADMIN — ZINC OXIDE 1 APPLICATION(S): 200 OINTMENT TOPICAL at 17:56

## 2022-08-21 RX ADMIN — Medication 7 UNIT(S): at 08:08

## 2022-08-21 RX ADMIN — GABAPENTIN 200 MILLIGRAM(S): 400 CAPSULE ORAL at 21:06

## 2022-08-21 RX ADMIN — BRIVARACETAM 50 MILLIGRAM(S): 25 TABLET, FILM COATED ORAL at 05:53

## 2022-08-21 RX ADMIN — Medication 25 MILLIGRAM(S): at 05:53

## 2022-08-21 RX ADMIN — ENOXAPARIN SODIUM 40 MILLIGRAM(S): 100 INJECTION SUBCUTANEOUS at 17:56

## 2022-08-21 NOTE — PROGRESS NOTE ADULT - ASSESSMENT
Patient is a 81 YO female with PMHx HTN, DM, HLD, CAD, MI, PAD, peripheral neuropathy, former smoker, who presented to [facility] ED on 7/19/22 with brain mass s/p Right craniotomy/resection 7/27/22, Pathology +NSC carcinoma. Post-op course significant for STEMI, hyperkalemia, encephalopathy 2/2 UTI, new onset a-fib.    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 Recommend increase to full dose of lovenox 1mg/kg BID once surgically feasible.    - continue Comprehensive Rehab Program: PT/OT/ST, 3hours daily and 5 days weekly  - recreation therapy. Add psychology supportive services as patient with anxiety about diagnosis and future Rx  - Precautions: cardiac, DM, fall, sensory, SZ, AMS, aspiration, AC    # dry eyes  - artificial tears TID standing  - private ophthalmologist Dr. 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 Discussed with patient    # HTN/CAD/A-fib  - c/w ASA  - Eliquis 5mg BID  - Metoprolol 25mg BID  - Lipitor 40mg daily continues  - 121/72 8/21    # DMII  - a1c 8.7 on 7/20  - ISS and FS  - lantus / admleog    # leukocytosis: resolved  - likely due to steroids, UTI  - WBC 7.7 8/18   - CBC 8/22    # RI- improved  - encourage fluids  - BUn/Cr 48/1.27 8/18--> 35/1.12 8/19; 31/1.09 8/21  - CMP 8/21      # UTI  - UA +. many bacteria mod LE WBC >50  - urine Cx (+) Klebsiella, sensitive to Cipro  - cipro 500 mg q12 x 3 days, completed  - bladder scan q8  - U/A    # Thyroid Nodule  - f/u with Endocrinology on dc    # PE  - Eliquis 5mg BID    # Pain management  - Tylenol PRN  - gabapentin    # GI ppx  - Protonix 40mg  - Senna, miralax PRN    # FEN   - Diet: Consistent Carbohydrate Diabetic     # Skin:  - Skin on admission: stage 1 pressure ulcer on LLE lateral heel and right buttock, cranial staples c/d/i. Forehead open blister area with granulation tissue  - Pressure injury/Skin: Turn Q2hrs while in bed, OOB to Chair, PT/OT     # DVT ppx  - eliquis      # LABS  CBC BMP 8/22

## 2022-08-21 NOTE — PROGRESS NOTE ADULT - SUBJECTIVE AND OBJECTIVE BOX
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 She was found to have a brain mass on imaging while undergoing work up by her orthopedist (Dr. Castro).    MR brain 7/20/22 +Right temporal lobe peripherally enhancing, centrally necrotic 3.1 x 2.6 cm mass with surrounding vasogenic edema. 8 mm leftward midline shift. Right uncal herniation. Additional left parafalcine 1.3 x 0.9 cm rim-enhancing cystic lesion with mild surrounding vasogenic edema. Tiny 0.3 x 0.3cm rim-enhancing lesion within the left frontal cortex concerning for metastatic disease. CT chest showed an obstructing left upper lobe endobronchial lesion. 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 Patient underwent Right craniotomy for resection of brain mass 7/27/22, Pathology +NSC carcinoma (favor adeno). Plans for out-pt PET/CT scan, RT, and consideration for systemic therapy.     Post-op course significant for STEMI 8/1/22. Not a candidate for cath secondary to comorbidities and recent surgery. She was started on ASA/ heparin gtt and a betablocker. TTE 7/22 +normal LV internal dimensions with discrete upper septal hypertrophy. EF 55%, inferolateral wall is akinetic. She was found to have RML segmental PE 8./3. Hospital course also significant for hyperkalemia, and encephalopathy, likely secondary to UTI/early urosepsis (treated with 5 days of Zosyn). EEG +Right temporal sharp waves, no seizures; started on Briviact 8/3/22. She developed new onset Afib w RVR on 8/4/22. with elevated troponin; metoprolol was increased, and heparin anticoagulation switched to Eliquis on 8/9/22.    Patient was evaluated by PM&R and therapy for functional deficits, gait/ADL impairments and acute rehabilitation was recommended. Patient was medically optimized for discharge to [facility] IRF 8/10/22. (10 Aug 2022 13:45)      PAST MEDICAL & SURGICAL HISTORY:  Hypertension      Coronary artery disease      Hyperlipidemia      Peripheral artery disease      Myocardial infarct      Peripheral neuropathy      Diabetes      Chronic back pain      Former smoker      PAD (peripheral artery disease)      CAD (coronary artery disease)  stents          MEDICATIONS  (STANDING):  artificial  tears Solution 1 Drop(s) Both EYES three times a day  atorvastatin 40 milliGRAM(s) Oral at bedtime  brivaracetam 50 milliGRAM(s) Oral every 12 hours  ciprofloxacin     Tablet 500 milliGRAM(s) Oral every 12 hours  dextrose 5%. 1000 milliLiter(s) (100 mL/Hr) IV Continuous <Continuous>  dextrose 5%. 1000 milliLiter(s) (50 mL/Hr) IV Continuous <Continuous>  dextrose 50% Injectable 25 Gram(s) IV Push once  dextrose 50% Injectable 12.5 Gram(s) IV Push once  dextrose 50% Injectable 25 Gram(s) IV Push once  diVALproex  milliGRAM(s) Oral every 8 hours  enoxaparin Injectable 40 milliGRAM(s) SubCutaneous <User Schedule>  gabapentin 200 milliGRAM(s) Oral at bedtime  glucagon  Injectable 1 milliGRAM(s) IntraMuscular once  insulin glargine Injectable (LANTUS) 12 Unit(s) SubCutaneous at bedtime  insulin lispro (ADMELOG) corrective regimen sliding scale   SubCutaneous three times a day before meals  insulin lispro (ADMELOG) corrective regimen sliding scale   SubCutaneous at bedtime  insulin lispro Injectable (ADMELOG) 7 Unit(s) SubCutaneous three times a day before meals  melatonin 6 milliGRAM(s) Oral at bedtime  metoprolol tartrate 25 milliGRAM(s) Oral two times a day  pantoprazole    Tablet 40 milliGRAM(s) Oral before breakfast  polyethylene glycol 3350 17 Gram(s) Oral daily  senna 2 Tablet(s) Oral at bedtime  sodium chloride 0.9%. 1000 milliLiter(s) (60 mL/Hr) IV Continuous <Continuous>  zinc oxide 40% Paste 1 Application(s) Topical two times a day    MEDICATIONS  (PRN):  acetaminophen     Tablet .. 650 milliGRAM(s) Oral every 6 hours PRN Temp greater or equal to 38C (100.4F), Mild Pain (1 - 3)  bisacodyl Oral Tab/Cap - Peds 5 milliGRAM(s) Oral daily PRN Constipation  dextrose Oral Gel 15 Gram(s) Oral once PRN Blood Glucose LESS THAN 70 milliGRAM(s)/deciliter      Allergies    adhesives (Pruritus)  No Known Drug Allergies    Intolerances      ROS/Subjective: Patient seen this morning at bedside. Patient in bed in NAD, no SOB, no n/v, no CP, no pain    VITALS  Vital Signs Last 24 Hrs  T(C): 36.6 (20 Aug 2022 21:17), Max: 36.6 (20 Aug 2022 21:17)  T(F): 97.8 (20 Aug 2022 21:17), Max: 97.8 (20 Aug 2022 21:17)  HR: 67 (21 Aug 2022 05:51) (67 - 78)  BP: 121/72 (21 Aug 2022 05:51) (95/58 - 121/72)  BP(mean): --  RR: 17 (20 Aug 2022 21:17) (16 - 17)  SpO2: 99% (20 Aug 2022 21:17) (98% - 99%)    Parameters below as of 20 Aug 2022 21:17  Patient On (Oxygen Delivery Method): room air              PHYSICAL EXAM:  GENERAL: NAD, well-developed female  HEAD:  Surgical incision c/d/i  NECK: Supple  CHEST/LUNG: Clear to auscultation bilaterally; No wheeze  HEART: Regular rate and rhythm  ABDOMEN: Soft, Nontender, Nondistended; Bowel sounds present  EXTREMITIES:  2+ Peripheral Pulses, No clubbing, cyanosis, or edema  PSYCH: calm, appropriate mood  Motor:  BUE: 5/5mp  BLE 5/5mp          RECENT LABS:  LABS:  LABS:    21 Aug 2022 06:00    141    |  107    |  31     ----------------------------<  158    4.3     |  22     |  1.09     Ca    8.2        21 Aug 2022 06:00          Ca    8.2        19 Aug 2022 05:30                              11.0   7.74  )-----------( 305      ( 18 Aug 2022 06:45 )             34.5     08-19    143  |  109<H>  |  35<H>  ----------------------------<  128<H>  3.6   |  26  |  1.12    Ca    8.2<L>      19 Aug 2022 05:30              Culture - Urine (collected 08-17-22 @ 17:05)  Source: Clean Catch Clean Catch (Midstream)  Preliminary Report (08-19-22 @ 12:48):    >100,000 CFU/ml Klebsiella pneumoniae    <10,000 CFU/ml Normal Urogenital anju present        CAPILLARY BLOOD GLUCOSE      POCT Blood Glucose.: 150 mg/dL (19 Aug 2022 12:05)  POCT Blood Glucose.: 162 mg/dL (19 Aug 2022 08:16)  POCT Blood Glucose.: 72 mg/dL (18 Aug 2022 21:42)  POCT Blood Glucose.: 111 mg/dL (18 Aug 2022 17:17)

## 2022-08-22 LAB
ANION GAP SERPL CALC-SCNC: 8 MMOL/L — SIGNIFICANT CHANGE UP (ref 5–17)
BUN SERPL-MCNC: 32 MG/DL — HIGH (ref 7–23)
CALCIUM SERPL-MCNC: 7.8 MG/DL — LOW (ref 8.4–10.5)
CHLORIDE SERPL-SCNC: 109 MMOL/L — HIGH (ref 96–108)
CO2 SERPL-SCNC: 28 MMOL/L — SIGNIFICANT CHANGE UP (ref 22–31)
CREAT SERPL-MCNC: 0.91 MG/DL — SIGNIFICANT CHANGE UP (ref 0.5–1.3)
EGFR: 63 ML/MIN/1.73M2 — SIGNIFICANT CHANGE UP
GLUCOSE BLDC GLUCOMTR-MCNC: 133 MG/DL — HIGH (ref 70–99)
GLUCOSE BLDC GLUCOMTR-MCNC: 135 MG/DL — HIGH (ref 70–99)
GLUCOSE BLDC GLUCOMTR-MCNC: 148 MG/DL — HIGH (ref 70–99)
GLUCOSE BLDC GLUCOMTR-MCNC: 95 MG/DL — SIGNIFICANT CHANGE UP (ref 70–99)
GLUCOSE SERPL-MCNC: 152 MG/DL — HIGH (ref 70–99)
HCT VFR BLD CALC: 30.7 % — LOW (ref 34.5–45)
HGB BLD-MCNC: 9.9 G/DL — LOW (ref 11.5–15.5)
MCHC RBC-ENTMCNC: 31.5 PG — SIGNIFICANT CHANGE UP (ref 27–34)
MCHC RBC-ENTMCNC: 32.2 GM/DL — SIGNIFICANT CHANGE UP (ref 32–36)
MCV RBC AUTO: 97.8 FL — SIGNIFICANT CHANGE UP (ref 80–100)
NRBC # BLD: 0 /100 WBCS — SIGNIFICANT CHANGE UP (ref 0–0)
PLATELET # BLD AUTO: 217 K/UL — SIGNIFICANT CHANGE UP (ref 150–400)
POTASSIUM SERPL-MCNC: 4 MMOL/L — SIGNIFICANT CHANGE UP (ref 3.5–5.3)
POTASSIUM SERPL-SCNC: 4 MMOL/L — SIGNIFICANT CHANGE UP (ref 3.5–5.3)
RBC # BLD: 3.14 M/UL — LOW (ref 3.8–5.2)
RBC # FLD: 15.1 % — HIGH (ref 10.3–14.5)
SODIUM SERPL-SCNC: 145 MMOL/L — SIGNIFICANT CHANGE UP (ref 135–145)
WBC # BLD: 4.1 K/UL — SIGNIFICANT CHANGE UP (ref 3.8–10.5)
WBC # FLD AUTO: 4.1 K/UL — SIGNIFICANT CHANGE UP (ref 3.8–10.5)

## 2022-08-22 PROCEDURE — 99232 SBSQ HOSP IP/OBS MODERATE 35: CPT

## 2022-08-22 RX ADMIN — Medication 7 UNIT(S): at 08:07

## 2022-08-22 RX ADMIN — ZINC OXIDE 1 APPLICATION(S): 200 OINTMENT TOPICAL at 18:16

## 2022-08-22 RX ADMIN — Medication 1 DROP(S): at 14:48

## 2022-08-22 RX ADMIN — INSULIN GLARGINE 12 UNIT(S): 100 INJECTION, SOLUTION SUBCUTANEOUS at 21:19

## 2022-08-22 RX ADMIN — DIVALPROEX SODIUM 500 MILLIGRAM(S): 500 TABLET, DELAYED RELEASE ORAL at 05:01

## 2022-08-22 RX ADMIN — ENOXAPARIN SODIUM 40 MILLIGRAM(S): 100 INJECTION SUBCUTANEOUS at 18:15

## 2022-08-22 RX ADMIN — Medication 1 DROP(S): at 21:17

## 2022-08-22 RX ADMIN — DIVALPROEX SODIUM 500 MILLIGRAM(S): 500 TABLET, DELAYED RELEASE ORAL at 14:48

## 2022-08-22 RX ADMIN — BRIVARACETAM 50 MILLIGRAM(S): 25 TABLET, FILM COATED ORAL at 18:15

## 2022-08-22 RX ADMIN — DIVALPROEX SODIUM 500 MILLIGRAM(S): 500 TABLET, DELAYED RELEASE ORAL at 21:18

## 2022-08-22 RX ADMIN — Medication 7 UNIT(S): at 17:19

## 2022-08-22 RX ADMIN — ZINC OXIDE 1 APPLICATION(S): 200 OINTMENT TOPICAL at 05:04

## 2022-08-22 RX ADMIN — Medication 25 MILLIGRAM(S): at 05:01

## 2022-08-22 RX ADMIN — GABAPENTIN 200 MILLIGRAM(S): 400 CAPSULE ORAL at 21:18

## 2022-08-22 RX ADMIN — SENNA PLUS 2 TABLET(S): 8.6 TABLET ORAL at 21:18

## 2022-08-22 RX ADMIN — BRIVARACETAM 50 MILLIGRAM(S): 25 TABLET, FILM COATED ORAL at 05:02

## 2022-08-22 RX ADMIN — Medication 6 MILLIGRAM(S): at 21:18

## 2022-08-22 RX ADMIN — ATORVASTATIN CALCIUM 40 MILLIGRAM(S): 80 TABLET, FILM COATED ORAL at 21:19

## 2022-08-22 RX ADMIN — PANTOPRAZOLE SODIUM 40 MILLIGRAM(S): 20 TABLET, DELAYED RELEASE ORAL at 05:01

## 2022-08-22 RX ADMIN — Medication 1 DROP(S): at 05:01

## 2022-08-22 NOTE — PROGRESS NOTE ADULT - SUBJECTIVE AND OBJECTIVE BOX
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 She was found to have a brain mass on imaging while undergoing work up by her orthopedist (Dr. Castro).    MR brain 22 +Right temporal lobe peripherally enhancing, centrally necrotic 3.1 x 2.6 cm mass with surrounding vasogenic edema. 8 mm leftward midline shift. Right uncal herniation. Additional left parafalcine 1.3 x 0.9 cm rim-enhancing cystic lesion with mild surrounding vasogenic edema. Tiny 0.3 x 0.3cm rim-enhancing lesion within the left frontal cortex concerning for metastatic disease. CT chest showed an obstructing left upper lobe endobronchial lesion. She had bronchial biopsy, consistent with poorly differentiated adenocarcinoma 22. Patient underwent Right craniotomy for resection of brain mass 22, Pathology +NSC carcinoma (favor adeno). Plans for out-pt PET/CT scan, RT, and consideration for systemic therapy.     Post-op course significant for STEMI 22. Not a candidate for cath secondary to comorbidities and recent surgery. She was started on ASA/ heparin gtt and a betablocker. TTE  +normal LV internal dimensions with discrete upper septal hypertrophy. EF 55%, inferolateral wall is akinetic. She was found to have RML segmental PE 8./3. Hospital course also significant for hyperkalemia, and encephalopathy, likely secondary to UTI/early urosepsis (treated with 5 days of Zosyn). EEG +Right temporal sharp waves, no seizures; started on Briviact 8/3/22. She developed new onset Afib w RVR on 22. with elevated troponin; metoprolol was increased, and heparin anticoagulation switched to Eliquis on 22.    Patient was evaluated by PM&R and therapy for functional deficits, gait/ADL impairments and acute rehabilitation was recommended. Patient was medically optimized for discharge to [facility] IRF 8/10/22. (10 Aug 2022 13:45)      PAST MEDICAL & SURGICAL HISTORY:  Hypertension      Coronary artery disease      Hyperlipidemia      Peripheral artery disease      Myocardial infarct      Peripheral neuropathy      Diabetes      Chronic back pain      Former smoker      PAD (peripheral artery disease)      CAD (coronary artery disease)  stents          MEDICATIONS  (STANDING):  artificial  tears Solution 1 Drop(s) Both EYES three times a day  atorvastatin 40 milliGRAM(s) Oral at bedtime  brivaracetam 50 milliGRAM(s) Oral every 12 hours  dextrose 5%. 1000 milliLiter(s) (50 mL/Hr) IV Continuous <Continuous>  dextrose 5%. 1000 milliLiter(s) (100 mL/Hr) IV Continuous <Continuous>  dextrose 50% Injectable 25 Gram(s) IV Push once  dextrose 50% Injectable 12.5 Gram(s) IV Push once  dextrose 50% Injectable 25 Gram(s) IV Push once  diVALproex  milliGRAM(s) Oral every 8 hours  enoxaparin Injectable 40 milliGRAM(s) SubCutaneous <User Schedule>  gabapentin 200 milliGRAM(s) Oral at bedtime  glucagon  Injectable 1 milliGRAM(s) IntraMuscular once  insulin glargine Injectable (LANTUS) 12 Unit(s) SubCutaneous at bedtime  insulin lispro (ADMELOG) corrective regimen sliding scale   SubCutaneous three times a day before meals  insulin lispro (ADMELOG) corrective regimen sliding scale   SubCutaneous at bedtime  insulin lispro Injectable (ADMELOG) 7 Unit(s) SubCutaneous three times a day before meals  melatonin 6 milliGRAM(s) Oral at bedtime  metoprolol tartrate 25 milliGRAM(s) Oral two times a day  pantoprazole    Tablet 40 milliGRAM(s) Oral before breakfast  polyethylene glycol 3350 17 Gram(s) Oral daily  senna 2 Tablet(s) Oral at bedtime  zinc oxide 40% Paste 1 Application(s) Topical two times a day    MEDICATIONS  (PRN):  acetaminophen     Tablet .. 650 milliGRAM(s) Oral every 6 hours PRN Temp greater or equal to 38C (100.4F), Mild Pain (1 - 3)  bisacodyl Oral Tab/Cap - Peds 5 milliGRAM(s) Oral daily PRN Constipation  dextrose Oral Gel 15 Gram(s) Oral once PRN Blood Glucose LESS THAN 70 milliGRAM(s)/deciliter      Allergies    adhesives (Pruritus)  No Known Drug Allergies    Intolerances          VITALS  82y  Vital Signs Last 24 Hrs  T(C): 36.6 (22 Aug 2022 07:38), Max: 36.6 (22 Aug 2022 07:38)  T(F): 97.9 (22 Aug 2022 07:38), Max: 97.9 (22 Aug 2022 07:38)  HR: 61 (22 Aug 2022 07:38) (61 - 79)  BP: 115/62 (22 Aug 2022 07:38) (109/72 - 119/76)  BP(mean): --  RR: 16 (22 Aug 2022 07:38) (16 - 16)  SpO2: 97% (22 Aug 2022 07:38) (97% - 97%)    Parameters below as of 22 Aug 2022 07:38  Patient On (Oxygen Delivery Method): room air      Daily     Daily Weight in k.8 (21 Aug 2022 23:42)        RECENT LABS:                          9.9    4.10  )-----------( 217      ( 22 Aug 2022 06:50 )             30.7         145  |  109<H>  |  32<H>  ----------------------------<  152<H>  4.0   |  28  |  0.91    Ca    7.8<L>      22 Aug 2022 06:50          Urinalysis Basic - ( 21 Aug 2022 10:30 )    Color: Yellow / Appearance: Clear / S.020 / pH: x  Gluc: x / Ketone: Negative  / Bili: Negative / Urobili: Negative   Blood: x / Protein: Negative / Nitrite: Negative   Leuk Esterase: Negative / RBC: x / WBC x   Sq Epi: x / Non Sq Epi: x / Bacteria: x        Culture - Urine (collected 22 @ 17:05)  Source: Clean Catch Clean Catch (Midstream)  Final Report (22 @ 08:46):    >100,000 CFU/ml Klebsiella pneumoniae    <10,000 CFU/ml Normal Urogenital anju present  Organism: Klebsiella pneumoniae (22 @ 08:46)  Organism: Klebsiella pneumoniae (22 @ 08:46)      -  Amikacin: S <=16      -  Amoxicillin/Clavulanic Acid: R >16/8      -  Ampicillin: R >16 These ampicillin results predict results for amoxicillin      -  Ampicillin/Sulbactam: R >16/8 Enterobacter, Klebsiella aerogenes, Citrobacter, and Serratia may develop resistance during prolonged therapy (3-4 days)      -  Aztreonam: S <=4      -  Cefazolin: R >16 ([id number]) For uncomplicated UTI with K. pneumoniae, E. coli, or P. mirablis: KRYSTYNA <=16 is sensitive and KRYSTYNA >=32 is resistant. This also predicts results for oral agents cefaclor, cefdinir, cefpodoxime, cefprozil, cefuroxime axetil, cephalexin and locarbef for uncomplicated UTI. Note that some isolates may be susceptible to these agents while testing resistant to cefazolin.      -  Cefepime: S <=2      -  Cefoxitin: S <=8      -  Ceftriaxone: S <=1 Enterobacter, Klebsiella aerogenes, Citrobacter, and Serratia may develop resistance during prolonged therapy      -  Ciprofloxacin: S <=0.25      -  Ertapenem: S <=0.5      -  Gentamicin: S <=2      -  Imipenem: S <=1      -  Levofloxacin: S <=0.5      -  Meropenem: S <=1      -  Nitrofurantoin: S <=32 Should not be used to treat pyelonephritis      -  Piperacillin/Tazobactam: R >64      -  Tigecycline: S <=2      -  Tobramycin: S <=2      -  Trimethoprim/Sulfamethoxazole: S <=0.5/9.5      Method Type: KRYSTYNA        CAPILLARY BLOOD GLUCOSE      POCT Blood Glucose.: 135 mg/dL (22 Aug 2022 07:25)  POCT Blood Glucose.: 163 mg/dL (21 Aug 2022 21:03)  POCT Blood Glucose.: 129 mg/dL (21 Aug 2022 17:03)  POCT Blood Glucose.: 162 mg/dL (21 Aug 2022 11:58)

## 2022-08-22 NOTE — PROGRESS NOTE ADULT - SUBJECTIVE AND OBJECTIVE BOX
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 No acute events overnight.  Pt denies cp, palpitations, sob, abd pain, N/V, fever, chills.    ROS:  All other review of systems negative    Allergies    adhesives (Pruritus)  No Known Drug Allergies    Intolerances        MEDICATIONS  (STANDING):  artificial  tears Solution 1 Drop(s) Both EYES three times a day  atorvastatin 40 milliGRAM(s) Oral at bedtime  brivaracetam 50 milliGRAM(s) Oral every 12 hours  dextrose 5%. 1000 milliLiter(s) (50 mL/Hr) IV Continuous <Continuous>  dextrose 5%. 1000 milliLiter(s) (100 mL/Hr) IV Continuous <Continuous>  dextrose 50% Injectable 25 Gram(s) IV Push once  dextrose 50% Injectable 12.5 Gram(s) IV Push once  dextrose 50% Injectable 25 Gram(s) IV Push once  diVALproex  milliGRAM(s) Oral every 8 hours  enoxaparin Injectable 40 milliGRAM(s) SubCutaneous <User Schedule>  gabapentin 200 milliGRAM(s) Oral at bedtime  glucagon  Injectable 1 milliGRAM(s) IntraMuscular once  insulin glargine Injectable (LANTUS) 12 Unit(s) SubCutaneous at bedtime  insulin lispro (ADMELOG) corrective regimen sliding scale   SubCutaneous three times a day before meals  insulin lispro (ADMELOG) corrective regimen sliding scale   SubCutaneous at bedtime  insulin lispro Injectable (ADMELOG) 7 Unit(s) SubCutaneous three times a day before meals  melatonin 6 milliGRAM(s) Oral at bedtime  metoprolol tartrate 25 milliGRAM(s) Oral two times a day  pantoprazole    Tablet 40 milliGRAM(s) Oral before breakfast  polyethylene glycol 3350 17 Gram(s) Oral daily  senna 2 Tablet(s) Oral at bedtime  zinc oxide 40% Paste 1 Application(s) Topical two times a day    MEDICATIONS  (PRN):  acetaminophen     Tablet .. 650 milliGRAM(s) Oral every 6 hours PRN Temp greater or equal to 38C (100.4F), Mild Pain (1 - 3)  bisacodyl Oral Tab/Cap - Peds 5 milliGRAM(s) Oral daily PRN Constipation  dextrose Oral Gel 15 Gram(s) Oral once PRN Blood Glucose LESS THAN 70 milliGRAM(s)/deciliter      Vital Signs Last 24 Hrs  T(C): 36.6 (22 Aug 2022 07:38), Max: 36.6 (22 Aug 2022 07:38)  T(F): 97.9 (22 Aug 2022 07:38), Max: 97.9 (22 Aug 2022 07:38)  HR: 61 (22 Aug 2022 07:38) (61 - 79)  BP: 115/62 (22 Aug 2022 07:38) (109/72 - 119/76)  BP(mean): --  RR: 16 (22 Aug 2022 07:38) (16 - 16)  SpO2: 97% (22 Aug 2022 07:38) (97% - 97%)    Parameters below as of 22 Aug 2022 07:38  Patient On (Oxygen Delivery Method): room air      CAPILLARY BLOOD GLUCOSE      POCT Blood Glucose.: 135 mg/dL (22 Aug 2022 07:25)  POCT Blood Glucose.: 163 mg/dL (21 Aug 2022 21:03)  POCT Blood Glucose.: 129 mg/dL (21 Aug 2022 17:03)  POCT Blood Glucose.: 162 mg/dL (21 Aug 2022 11:58)    I&O's Summary      PHYSICAL EXAM:  GENERAL: NAD, well-developed female  HEAD:  Surgical incision c/d/i  NECK: Supple, No JVD  CHEST/LUNG: Clear to auscultation bilaterally; No wheeze, nonlabored breathing  HEART: Regular rate and rhythm; No murmurs, rubs, or gallops  ABDOMEN: Soft, Nontender, Nondistended; Bowel sounds present  EXTREMITIES:  2+ Peripheral Pulses, No clubbing, cyanosis, or edema  PSYCH: calm, appropriate mood  SKIN: + Forehead blister with fluid drained    LABS:                        9.9    4.10  )-----------( 217      ( 22 Aug 2022 06:50 )             30.7     08-22    145  |  109<H>  |  32<H>  ----------------------------<  152<H>  4.0   |  28  |  0.91    Ca    7.8<L>      22 Aug 2022 06:50            Urinalysis Basic - ( 21 Aug 2022 10:30 )    Color: Yellow / Appearance: Clear / S.020 / pH: x  Gluc: x / Ketone: Negative  / Bili: Negative / Urobili: Negative   Blood: x / Protein: Negative / Nitrite: Negative   Leuk Esterase: Negative / RBC: x / WBC x   Sq Epi: x / Non Sq Epi: x / Bacteria: x        RADIOLOGY & ADDITIONAL TESTS:  Results Reviewed:   Imaging Personally Reviewed:  Electrocardiogram Personally Reviewed:    COORDINATION OF CARE:  Care Discussed with Consultants/Other Providers [Y/N]:  Prior or Outpatient Records Reviewed [Y/N]:

## 2022-08-22 NOTE — PROGRESS NOTE ADULT - ASSESSMENT
82 year old F PMH HTN, DM, HLD, CAD, MI, PAD, peripheral neuropathy, former smoker presented to [facility] ED on 7/19/22 with brain mass s/p Right craniotomy/resection 7/27/22. 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, primary lung  #ADL impairment  - MR brain 7/20/22 with Right temporal lobe peripherally enhancing, centrally necrotic 3.1 x 2.6 cm mass with surrounding vasogenic edema with METS  - s/p right craniotomy for resection of brain mass 7/27/22  - Bronchial biopsy consistent with poorly differentiated adenocarcinoma 7/20/22  - continue with Brivaracetam 50mg BID and decadron taper  - continue depakote 500mg q8hrs for seizure ppx  - will need outpatient PET/CT scan, radiation therapy and further evaluation with heme/onc    #PERCY  - Resolved  - encourage PO fluid intake    #orthostatic  - Off antihypertensives losartan and amlodipine   - encourage PO intake  - continue to monitor     #leukocytosis  - Resolved    #CAD  #STEMI  #new onset afib  #HTN  #HLD  - echo 8/8/22 with EF 60-65%, basal inferolateral wall, the basal inferior wall, the basal anterolateral wall, and the mid inferior wall are hypokinetic  - not candidate for PCI given risks > benefit as per cath team due to recent craniotomy   - continue ASA/statin/Lopressor  - continue eliquis 5mg BID  - Off antihypertensives losartan and amlodipine     #DM type 2  - HbA1C 8.7  -Lantus 12U and continue admelog 7U TID qAC    #history of PE  - found to have RML segmental PE on 8/3  - continue Eliquis 5mg BID    #anemia  - H/H stable  - anemia of chronic disease due to malignancy as per heme    #DVT ppx  - on Eliquis

## 2022-08-22 NOTE — PROGRESS NOTE ADULT - ASSESSMENT
Patient is a 83 YO female with PMHx HTN, DM, HLD, CAD, MI, PAD, peripheral neuropathy, former smoker, who presented to [facility] ED on 7/19/22 with brain mass s/p Right craniotomy/resection 7/27/22, Pathology +NSC carcinoma. Post-op course significant for STEMI, hyperkalemia, encephalopathy 2/2 UTI, new onset a-fib.    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 Recommend increase to full dose of lovenox 1mg/kg BID once surgically feasible.    - continue Comprehensive Rehab Program: PT/OT/ST, 3hours daily and 5 days weekly  - recreation therapy. Add psychology supportive services as patient with anxiety about diagnosis and future Rx  - Precautions: cardiac, DM, fall, sensory, SZ, AMS, aspiration, AC    # dry eyes/proliferators diabetic retinopathy/macular edema  - artificial tears TID standing  - private ophthalmologist Dr. Grady: Last visit was in May 2022, under treatment    # HTN/CAD/A-fib  - c/w ASA  - Eliquis 5mg BID  - Metoprolol 25mg BID  - Lipitor 40mg daily  - (109/72 - 119/76) 8/22    # DMII  - a1c 8.7 on 7/20  - ISS and FS  - lantus / admleog    # leukocytosis: resolved  - likely due to steroids, UTI  - WBC 7.7 8/18 --> 4.10 8/22 resolved    # RI- improved  - encourage fluids  - BUn/Cr 48/1.27 8/18--> 32/0.91 8/22  - CMP 8/25      # klebsiella UTI  - cipro 500 mg q12 x 3 days, completed    # Thyroid Nodule  - f/u with Endocrinology on dc    # PE  - Eliquis 5mg BID    # Pain management  - Tylenol PRN  - gabapentin    # GI ppx  - Protonix 40mg  - Senna, miralax PRN    # FEN   - Diet: Consistent Carbohydrate Diabetic     # Skin:  - Skin on admission: stage 1 pressure ulcer on LLE lateral heel and right buttock, cranial staples c/d/i. Forehead open blister area with granulation tissue  - Pressure injury/Skin: Turn Q2hrs while in bed, OOB to Chair, PT/OT     # DVT ppx  - eliquis    # Case discussed in IDT rounds 8/22:    Son:  [phone]          # LABS  CBC [facility] 8/25       Patient is a 83 YO female with PMHx HTN, DM, HLD, CAD, MI, PAD, peripheral neuropathy, former smoker, who presented to [facility] ED on 7/19/22 with brain mass s/p Right craniotomy/resection 7/27/22, Pathology +NSC carcinoma. Post-op course significant for STEMI, hyperkalemia, encephalopathy 2/2 UTI, new onset a-fib.    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 Recommend increase to full dose of lovenox 1mg/kg BID once surgically feasible.    - continue Comprehensive Rehab Program: PT/OT/ST, 3hours daily and 5 days weekly  - recreation therapy. Add psychology supportive services as patient with anxiety about diagnosis and future Rx  - Precautions: cardiac, DM, fall, sensory, SZ, AMS, aspiration, AC    # dry eyes/proliferators diabetic retinopathy/macular edema  - artificial tears TID standing  - private ophthalmologist Dr. Grady: Last visit was in May 2022, under treatment    # HTN/CAD/A-fib  - c/w ASA  - Eliquis 5mg BID  - Metoprolol 25mg BID  - Lipitor 40mg daily  - (109/72 - 119/76) 8/22    # DMII  - a1c 8.7 on 7/20  - ISS and FS  - lantus / admleog    # leukocytosis: resolved  - likely due to steroids, UTI  - WBC 7.7 8/18 --> 4.10 8/22 resolved    # RI- improved  - encourage fluids  - BUn/Cr 48/1.27 8/18--> 32/0.91 8/22. continue with increased po fluids, patient maintaining through oral intake  - CMP 8/25      # klebsiella UTI  - cipro 500 mg q12 x 3 days, completed    # Thyroid Nodule  - f/u with Endocrinology on dc    # PE  - Eliquis 5mg BID    # Pain management  - Tylenol PRN  - gabapentin    # GI ppx  - Protonix 40mg  - Senna, miralax PRN    # FEN   - Diet: Consistent Carbohydrate Diabetic     # Skin:  - Skin on admission: stage 1 pressure ulcer on LLE lateral heel and right buttock, cranial staples c/d/i. Forehead open blister area with granulation tissue  - Pressure injury/Skin: Turn Q2hrs while in bed, OOB to Chair, PT/OT     # DVT ppx  - eliquis    # Case discussed in IDT rounds 8/22:  - mild deficits in cognition, reduced attention, improved participation, ambulates 50-75 feet RW and CG, negotiates 4 steps with bilateral HR and min assist/CG  - goals: supervision bADLs, supervision transfers and ambulation, CG stairs Will need supervision 24 x 7 on dc due to cognitive deficits  - will request family meetng  - target dc home 8/25/22  - patient will need appointments with radiation oncology for palliative radiation therapy for the CNS metastasis.  Will also need set up with a lung oncologist at [facility] upon discharge to discuss palliative chemotherapy and immunotherapy.        Son Dick:  [phone]. Discussed with son diagnosis, current functional status, treatment of UTI and dehydration, as well as future treatment for lung CA and mets, At this time, her family also leans to home but agreeable with family meeting. They are aware she will need to balance getting stronger and her treatments. Sw to reach out for scheduling          # LABS  CBC BMP 8/25

## 2022-08-23 LAB
GLUCOSE BLDC GLUCOMTR-MCNC: 109 MG/DL — HIGH (ref 70–99)
GLUCOSE BLDC GLUCOMTR-MCNC: 135 MG/DL — HIGH (ref 70–99)
GLUCOSE BLDC GLUCOMTR-MCNC: 146 MG/DL — HIGH (ref 70–99)
GLUCOSE BLDC GLUCOMTR-MCNC: 77 MG/DL — SIGNIFICANT CHANGE UP (ref 70–99)

## 2022-08-23 PROCEDURE — 99233 SBSQ HOSP IP/OBS HIGH 50: CPT

## 2022-08-23 PROCEDURE — 99232 SBSQ HOSP IP/OBS MODERATE 35: CPT

## 2022-08-23 RX ADMIN — ENOXAPARIN SODIUM 40 MILLIGRAM(S): 100 INJECTION SUBCUTANEOUS at 18:20

## 2022-08-23 RX ADMIN — ZINC OXIDE 1 APPLICATION(S): 200 OINTMENT TOPICAL at 18:19

## 2022-08-23 RX ADMIN — Medication 1 DROP(S): at 22:00

## 2022-08-23 RX ADMIN — DIVALPROEX SODIUM 500 MILLIGRAM(S): 500 TABLET, DELAYED RELEASE ORAL at 05:07

## 2022-08-23 RX ADMIN — Medication 6 MILLIGRAM(S): at 22:00

## 2022-08-23 RX ADMIN — Medication 7 UNIT(S): at 08:10

## 2022-08-23 RX ADMIN — SENNA PLUS 2 TABLET(S): 8.6 TABLET ORAL at 22:00

## 2022-08-23 RX ADMIN — ZINC OXIDE 1 APPLICATION(S): 200 OINTMENT TOPICAL at 05:10

## 2022-08-23 RX ADMIN — Medication 7 UNIT(S): at 17:16

## 2022-08-23 RX ADMIN — Medication 1 DROP(S): at 15:09

## 2022-08-23 RX ADMIN — INSULIN GLARGINE 12 UNIT(S): 100 INJECTION, SOLUTION SUBCUTANEOUS at 22:00

## 2022-08-23 RX ADMIN — BRIVARACETAM 50 MILLIGRAM(S): 25 TABLET, FILM COATED ORAL at 05:09

## 2022-08-23 RX ADMIN — DIVALPROEX SODIUM 500 MILLIGRAM(S): 500 TABLET, DELAYED RELEASE ORAL at 22:00

## 2022-08-23 RX ADMIN — PANTOPRAZOLE SODIUM 40 MILLIGRAM(S): 20 TABLET, DELAYED RELEASE ORAL at 05:08

## 2022-08-23 RX ADMIN — Medication 25 MILLIGRAM(S): at 18:20

## 2022-08-23 RX ADMIN — DIVALPROEX SODIUM 500 MILLIGRAM(S): 500 TABLET, DELAYED RELEASE ORAL at 15:09

## 2022-08-23 RX ADMIN — Medication 1 DROP(S): at 05:08

## 2022-08-23 RX ADMIN — GABAPENTIN 200 MILLIGRAM(S): 400 CAPSULE ORAL at 22:00

## 2022-08-23 RX ADMIN — BRIVARACETAM 50 MILLIGRAM(S): 25 TABLET, FILM COATED ORAL at 18:20

## 2022-08-23 RX ADMIN — ATORVASTATIN CALCIUM 40 MILLIGRAM(S): 80 TABLET, FILM COATED ORAL at 22:00

## 2022-08-23 NOTE — PROGRESS NOTE ADULT - MUSCULOSKELETAL COMMENTS
calves soft, no TTP bilaterally, no pedal edema
bilateral HF 4/5 quad 4+/5 ankle PF and DF 5/5 normal tone

## 2022-08-23 NOTE — PROGRESS NOTE ADULT - ASSESSMENT
Patient is a 83 YO female with PMHx HTN, DM, HLD, CAD, MI, PAD, peripheral neuropathy, former smoker, who presented to [facility] ED on 7/19/22 with brain mass s/p Right craniotomy/resection 7/27/22, Pathology +NSC carcinoma. Post-op course significant for STEMI, hyperkalemia, encephalopathy, UTI, new onset a-fib.    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 Recommend increase to full dose of lovenox 1mg/kg BID once surgically feasible.    - continue Comprehensive Rehab Program: PT/OT/ST, 3hours daily and 5 days weekly  - recreation therapy. Add psychology supportive services as patient with anxiety about diagnosis and future Rx  - Precautions: cardiac, DM, fall, sensory, SZ, AMS, aspiration, AC    # dry eyes/proliferators diabetic retinopathy/macular edema  - artificial tears TID  - stable  - private ophthalmologist Dr. Grady on dc    # HTN/CAD/A-fib  - c/w ASA  - Eliquis 5mg BID  - Metoprolol 25mg BID  - Lipitor 40mg daily  - (98/60 - 116/65) HR 73-86 8/23    # DMII  - a1c 8.7 on 7/20  - ISS and FS  - lantus / admleog    # leukocytosis: resolved  - likely due to steroids, UTI  - WBC 7.7 8/18 --> 4.10 8/22 resolved    # RI- improved  - encourage fluids  - BUn/Cr 48/1.27 8/18--> 32/0.91 8/22. continue with increased po fluids, patient maintaining through oral intake  - CMP 8/25      # klebsiella UTI  - cipro 500 mg q12 x 3 days, completed    # Thyroid Nodule  - f/u with Endocrinology on dc    # PE  - Eliquis 5mg BID    # Pain management  - Tylenol PRN  - gabapentin    # GI ppx  - Protonix 40mg  - Senna, miralax PRN    # FEN   - Diet: Consistent Carbohydrate Diabetic     # Skin:  - Skin on admission: stage 1 pressure ulcer on LLE lateral heel and right buttock, cranial staples c/d/i. Forehead open blister area with granulation tissue  - Pressure injury/Skin: Turn Q2hrs while in bed, OOB to Chair, PT/OT     # DVT ppx  - eliquis    # Case discussed in IDT rounds 8/22:  - mild deficits in cognition, reduced attention, improved participation, ambulates 50-75 feet RW and CG, negotiates 4 steps with bilateral HR and min assist/CG  - goals: supervision bADLs, supervision transfers and ambulation, CG stairs Will need supervision 24 x 7 on dc due to cognitive deficits  - will request family meetng  - target dc home 8/25/22  - patient will need appointments with radiation oncology for palliative radiation therapy for the CNS metastasis.  Will also need set up with a lung oncologist at [facility] upon discharge to discuss palliative chemotherapy and immunotherapy.        Son Dick:  [phone]. Discussed with son diagnosis, current functional status, treatment of UTI and dehydration, as well as future treatment for lung CA and mets, At this time, her family also leans to home but agreeable with family meeting. They are aware she will need to balance getting stronger and her treatments. Sw to reach out for scheduling          # LABS  CBC BMP 8/25       Patient is a 83 YO female with PMHx HTN, DM, HLD, CAD, MI, PAD, peripheral neuropathy, former smoker, who presented to [facility] ED on 7/19/22 with brain mass s/p Right craniotomy/resection 7/27/22, Pathology +NSC carcinoma. Post-op course significant for STEMI, hyperkalemia, encephalopathy, UTI, new onset a-fib.    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 Recommend increase to full dose of lovenox 1mg/kg BID once surgically feasible.    - continue Comprehensive Rehab Program: PT/OT/ST, 3hours daily and 5 days weekly  - recreation therapy. Add psychology supportive services as patient with anxiety about diagnosis and future Rx  - Precautions: cardiac, DM, fall, sensory, SZ, AMS, aspiration, AC    # dry eyes/proliferators diabetic retinopathy/macular edema  - artificial tears TID  - stable  - private ophthalmologist Dr. Grady on dc    # HTN/CAD/A-fib  - c/w ASA  - Eliquis 5mg BID  - Metoprolol 25mg BID  - Lipitor 40mg daily  - (98/60 - 116/65) HR 73-86 8/23    # DMII  - a1c 8.7 on 7/20  - ISS and FS  - lantus / admleog    # leukocytosis: resolved  - likely due to steroids, UTI  - WBC 7.7 8/18 --> 4.10 8/22 resolved    # RI- improved  - encourage fluids  - BUn/Cr 48/1.27 8/18--> 32/0.91 8/22  - continue with increased po fluids, patient maintaining through oral intake  - CMP 8/25      # klebsiella UTI  - cipro 500 mg q12 x 3 days, completed    # Thyroid Nodule  - f/u with Endocrinology on dc    # PE  - Eliquis 5mg BID    # Pain management  - Tylenol PRN  - gabapentin    # GI ppx  - Protonix 40mg  - Senna, miralax PRN    # FEN   - Diet: Consistent Carbohydrate Diabetic     # Skin:  - Skin on admission: stage 1 pressure ulcer on LLE lateral heel and right buttock healed    # DVT ppx  - eliquis    # Case discussed in IDT rounds 8/22:  - mild deficits in cognition, reduced attention, improved participation, ambulates 50-75 feet RW and CG, negotiates 4 steps with bilateral HR and min assist/CG  - goals: supervision bADLs, supervision transfers and ambulation, CG stairs Will need supervision 24 x 7 on dc due to cognitive deficits  - patient will need appointments with radiation oncology for palliative radiation therapy for the CNS metastasis.  Will also need set up with a lung oncologist at [facility] upon discharge to discuss palliative chemotherapy and immunotherapy.        Son Dick:  [phone], patient, and daughter-in-law Suzan met with SW, Pt, OT, SLP, myself and MS4 today to discuss current functional status, DME recommendations, safety considerations, home care services and outpatient follow up. Family is arranging for 24 hour private hire at this time for dc, and has also noted significant improvement in patient's endurance, arousal, verbalization. They are trying to coordinate their schedule and aide's for discharge; will target Friday 8/26 if confirmed by aide, otherwise Tuesday 8/30. They are also installing grab/bars/modifications 8/25.   - meds: maintenance through mail order (son to provide list of meds)  - new medications: [facility] [address] [place], [place]  - DME: shower chair, raised toilet seat, transport WC, RW  - time spent 75 minutes          # LABS  CBC BMP 8/25       Patient is a 81 YO female with PMHx HTN, DM, HLD, CAD, MI, PAD, peripheral neuropathy, former smoker, who presented to [facility] ED on 7/19/22 with brain mass s/p Right craniotomy/resection 7/27/22, Pathology +NSC carcinoma. Post-op course significant for STEMI, hyperkalemia, encephalopathy, UTI, new onset a-fib.    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 Recommend increase to full dose of lovenox 1mg/kg BID once surgically feasible.    - continue Comprehensive Rehab Program: PT/OT/ST, 3hours daily and 5 days weekly  - recreation therapy. Add psychology supportive services as patient with anxiety about diagnosis and future Rx  - Precautions: cardiac, DM, fall, sensory, SZ, AMS, aspiration, AC    # dry eyes/proliferators diabetic retinopathy/macular edema  - artificial tears TID  - stable  - private ophthalmologist Dr. Grady on dc    # HTN/CAD/A-fib  - c/w ASA  - Eliquis 5mg BID  - Metoprolol 25mg BID  - Lipitor 40mg daily  - (98/60 - 116/65) HR 73-86 8/23    # DMII  - a1c 8.7 on 7/20  - ISS and FS  - lantus / admleog    # leukocytosis: resolved  - likely due to steroids, UTI  - WBC 7.7 8/18 --> 4.10 8/22 resolved    # RI- improved  - encourage fluids  - BUn/Cr 48/1.27 8/18--> 32/0.91 8/22  - continue with increased po fluids, patient maintaining through oral intake  - CMP 8/25      # klebsiella UTI  - cipro 500 mg q12 x 3 days, completed    # Thyroid Nodule  - f/u with Endocrinology on dc    # PE  - Eliquis 5mg BID    # Pain management  - Tylenol PRN  - gabapentin    # GI ppx  - Protonix 40mg  - Senna, miralax PRN    # FEN   - Diet: Consistent Carbohydrate Diabetic     # Skin:  - Skin on admission: stage 1 pressure ulcer on LLE lateral heel and right buttock healed    # DVT ppx  - eliquis    # Case discussed in IDT rounds 8/22:  - mild deficits in cognition, reduced attention, improved participation, ambulates 50-75 feet RW and CG, negotiates 4 steps with bilateral HR and min assist/CG  - goals: supervision bADLs, supervision transfers and ambulation, CG stairs Will need supervision 24 x 7 on dc due to cognitive deficits  - patient will need appointments with radiation oncology for palliative radiation therapy for the CNS metastasis.  Will also need set up with a lung oncologist at [facility] upon discharge to discuss palliative chemotherapy and immunotherapy.        Son Dick:  [phone], patient, and daughter-in-law Suzan met with SW, Pt, OT, SLP, myself and MS4 today in family meeting to discuss current functional status, DME recommendations, safety considerations, home care services and outpatient follow up. Family is arranging for 24 hour private hire at this time for dc, and has also noted significant improvement in patient's endurance, arousal, verbalization. They are trying to coordinate their schedule and aide's for discharge; will target Friday 8/26 if confirmed by aide, otherwise Tuesday 8/30. They are also installing grab/bars/modifications 8/25.   - meds: maintenance through mail order (son to provide list of meds)  - new medications: [facility] [address] [place], [place]  - DME: shower chair, raised toilet seat, transport WC, RW  - time spent 75 minutes for AM rounds/evaluation/management and family meeting          # LABS  CBC BMP 8/25

## 2022-08-23 NOTE — PROGRESS NOTE ADULT - SUBJECTIVE AND OBJECTIVE BOX
Patient is a 82y old  Female who presents with a chief complaint of meetastatic adenoCA to brain s/p resection 7/27/22 (22 Aug 2022 09:37)      SUBJECTIVE / OVERNIGHT EVENTS:  Pt seen and examined at bedside. No acute events overnight.  Pt denies cp, palpitations, sob, abd pain, N/V, fever, chills.    ROS:  All other review of systems negative    Allergies    adhesives (Pruritus)  No Known Drug Allergies    Intolerances        MEDICATIONS  (STANDING):  artificial  tears Solution 1 Drop(s) Both EYES three times a day  atorvastatin 40 milliGRAM(s) Oral at bedtime  brivaracetam 50 milliGRAM(s) Oral every 12 hours  dextrose 5%. 1000 milliLiter(s) (100 mL/Hr) IV Continuous <Continuous>  dextrose 5%. 1000 milliLiter(s) (50 mL/Hr) IV Continuous <Continuous>  dextrose 50% Injectable 25 Gram(s) IV Push once  dextrose 50% Injectable 12.5 Gram(s) IV Push once  dextrose 50% Injectable 25 Gram(s) IV Push once  diVALproex  milliGRAM(s) Oral every 8 hours  enoxaparin Injectable 40 milliGRAM(s) SubCutaneous <User Schedule>  gabapentin 200 milliGRAM(s) Oral at bedtime  glucagon  Injectable 1 milliGRAM(s) IntraMuscular once  insulin glargine Injectable (LANTUS) 12 Unit(s) SubCutaneous at bedtime  insulin lispro (ADMELOG) corrective regimen sliding scale   SubCutaneous three times a day before meals  insulin lispro (ADMELOG) corrective regimen sliding scale   SubCutaneous at bedtime  insulin lispro Injectable (ADMELOG) 7 Unit(s) SubCutaneous three times a day before meals  melatonin 6 milliGRAM(s) Oral at bedtime  metoprolol tartrate 25 milliGRAM(s) Oral two times a day  pantoprazole    Tablet 40 milliGRAM(s) Oral before breakfast  polyethylene glycol 3350 17 Gram(s) Oral daily  senna 2 Tablet(s) Oral at bedtime  zinc oxide 40% Paste 1 Application(s) Topical two times a day    MEDICATIONS  (PRN):  acetaminophen     Tablet .. 650 milliGRAM(s) Oral every 6 hours PRN Temp greater or equal to 38C (100.4F), Mild Pain (1 - 3)  bisacodyl Oral Tab/Cap - Peds 5 milliGRAM(s) Oral daily PRN Constipation  dextrose Oral Gel 15 Gram(s) Oral once PRN Blood Glucose LESS THAN 70 milliGRAM(s)/deciliter      Vital Signs Last 24 Hrs  T(C): 36.8 (23 Aug 2022 08:02), Max: 36.8 (23 Aug 2022 08:02)  T(F): 98.2 (23 Aug 2022 08:02), Max: 98.2 (23 Aug 2022 08:02)  HR: 73 (23 Aug 2022 08:02) (73 - 86)  BP: 116/65 (23 Aug 2022 08:02) (98/60 - 116/65)  BP(mean): --  RR: 16 (23 Aug 2022 08:02) (16 - 16)  SpO2: 96% (23 Aug 2022 08:02) (95% - 96%)    Parameters below as of 23 Aug 2022 08:02  Patient On (Oxygen Delivery Method): room air      CAPILLARY BLOOD GLUCOSE      POCT Blood Glucose.: 135 mg/dL (23 Aug 2022 08:04)  POCT Blood Glucose.: 148 mg/dL (22 Aug 2022 21:10)  POCT Blood Glucose.: 133 mg/dL (22 Aug 2022 17:08)  POCT Blood Glucose.: 95 mg/dL (22 Aug 2022 11:44)    I&O's Summary      PHYSICAL EXAM:  GENERAL: NAD, well-developed female  HEAD:  Surgical incision c/d/i  NECK: Supple, No JVD  CHEST/LUNG: Clear to auscultation bilaterally; No wheeze, nonlabored breathing  HEART: Regular rate and rhythm; No murmurs, rubs, or gallops  ABDOMEN: Soft, Nontender, Nondistended; Bowel sounds present  EXTREMITIES:  2+ Peripheral Pulses, No clubbing, cyanosis, or edema  PSYCH: calm, appropriate mood  SKIN: + Forehead blister with fluid drained    LABS:                        9.9    4.10  )-----------( 217      ( 22 Aug 2022 06:50 )             30.7     08-22    145  |  109<H>  |  32<H>  ----------------------------<  152<H>  4.0   |  28  |  0.91    Ca    7.8<L>      22 Aug 2022 06:50                RADIOLOGY & ADDITIONAL TESTS:  Results Reviewed:   Imaging Personally Reviewed:  Electrocardiogram Personally Reviewed:    COORDINATION OF CARE:  Care Discussed with Consultants/Other Providers [Y/N]:  Prior or Outpatient Records Reviewed [Y/N]:

## 2022-08-23 NOTE — PROGRESS NOTE ADULT - ASSESSMENT
82 year old F PMH HTN, DM, HLD, CAD, MI, PAD, peripheral neuropathy, former smoker presented to [facility] ED on 7/19/22 with brain mass s/p Right craniotomy/resection 7/27/22. 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, primary lung  #ADL impairment  - MR brain 7/20/22 with Right temporal lobe peripherally enhancing, centrally necrotic 3.1 x 2.6 cm mass with surrounding vasogenic edema with METS  - s/p right craniotomy for resection of brain mass 7/27/22  - Bronchial biopsy consistent with poorly differentiated adenocarcinoma 7/20/22  - continue with Brivaracetam 50mg BID and decadron taper  - continue depakote 500mg q8hrs for seizure ppx  - will need outpatient PET/CT scan, radiation therapy and further evaluation with heme/onc    #PERCY  - Resolved  - encourage PO fluid intake    #orthostatic  - Off antihypertensives losartan and amlodipine   - encourage PO intake  - continue to monitor     #leukocytosis  - Resolved    #CAD  #STEMI  #new onset afib  #HTN  #HLD  - echo 8/8/22 with EF 60-65%, basal inferolateral wall, the basal inferior wall, the basal anterolateral wall, and the mid inferior wall are hypokinetic  - not candidate for PCI given risks > benefit as per cath team due to recent craniotomy   - continue ASA/statin/Lopressor  - continue eliquis 5mg BID  - Off antihypertensives losartan and amlodipine     #DM type 2  - HbA1C 8.7  -Lantus 12U and continue admelog 7U TID qAC    #history of PE  - found to have RML segmental PE on 8/3  - continue Eliquis 5mg BID    #anemia  - H/H stable  - anemia of chronic disease due to malignancy as per heme    #DVT ppx  - on Eliquis

## 2022-08-23 NOTE — PROGRESS NOTE ADULT - SUBJECTIVE AND OBJECTIVE BOX
Patient is a 82y old  Female who presents with a chief complaint of meetastatic adenoCA to brain s/p resection 22 (23 Aug 2022 11:24)      HPI:  Patient is a 83 YO female RH dominant with PMHx HTN, DM, HLD, CAD, MI, PAD, peripheral neuropathy, former smoker, who presented to [facility] ED on 22 with chronic back pain and gait/balance issues x 4 months. She was found to have a brain mass on imaging while undergoing work up by her orthopedist (Dr. Castro).    MR brain 22 +Right temporal lobe peripherally enhancing, centrally necrotic 3.1 x 2.6 cm mass with surrounding vasogenic edema. 8 mm leftward midline shift. Right uncal herniation. Additional left parafalcine 1.3 x 0.9 cm rim-enhancing cystic lesion with mild surrounding vasogenic edema. Tiny 0.3 x 0.3cm rim-enhancing lesion within the left frontal cortex concerning for metastatic disease. CT chest showed an obstructing left upper lobe endobronchial lesion. She had bronchial biopsy, consistent with poorly differentiated adenocarcinoma 22. Patient underwent Right craniotomy for resection of brain mass 22, Pathology +NSC carcinoma (favor adeno). Plans for out-pt PET/CT scan, RT, and consideration for systemic therapy.     Post-op course significant for STEMI 22. Not a candidate for cath secondary to comorbidities and recent surgery. She was started on ASA/ heparin gtt and a betablocker. TTE  +normal LV internal dimensions with discrete upper septal hypertrophy. EF 55%, inferolateral wall is akinetic. She was found to have RML segmental PE 8./3. Hospital course also significant for hyperkalemia, and encephalopathy, likely secondary to UTI/early urosepsis (treated with 5 days of Zosyn). EEG +Right temporal sharp waves, no seizures; started on Briviact 8/3/22. She developed new onset Afib w RVR on 22. with elevated troponin; metoprolol was increased, and heparin anticoagulation switched to Eliquis on 22.    Patient was evaluated by PM&R and therapy for functional deficits, gait/ADL impairments and acute rehabilitation was recommended. Patient was medically optimized for discharge to [facility] IRF 8/10/22. (10 Aug 2022 13:45)      PAST MEDICAL & SURGICAL HISTORY:  Hypertension      Coronary artery disease      Hyperlipidemia      Peripheral artery disease      Myocardial infarct      Peripheral neuropathy      Diabetes      Chronic back pain      Former smoker      PAD (peripheral artery disease)      CAD (coronary artery disease)  stents          MEDICATIONS  (STANDING):  artificial  tears Solution 1 Drop(s) Both EYES three times a day  atorvastatin 40 milliGRAM(s) Oral at bedtime  brivaracetam 50 milliGRAM(s) Oral every 12 hours  dextrose 5%. 1000 milliLiter(s) (100 mL/Hr) IV Continuous <Continuous>  dextrose 5%. 1000 milliLiter(s) (50 mL/Hr) IV Continuous <Continuous>  dextrose 50% Injectable 25 Gram(s) IV Push once  dextrose 50% Injectable 12.5 Gram(s) IV Push once  dextrose 50% Injectable 25 Gram(s) IV Push once  diVALproex  milliGRAM(s) Oral every 8 hours  enoxaparin Injectable 40 milliGRAM(s) SubCutaneous <User Schedule>  gabapentin 200 milliGRAM(s) Oral at bedtime  glucagon  Injectable 1 milliGRAM(s) IntraMuscular once  insulin glargine Injectable (LANTUS) 12 Unit(s) SubCutaneous at bedtime  insulin lispro (ADMELOG) corrective regimen sliding scale   SubCutaneous three times a day before meals  insulin lispro (ADMELOG) corrective regimen sliding scale   SubCutaneous at bedtime  insulin lispro Injectable (ADMELOG) 7 Unit(s) SubCutaneous three times a day before meals  melatonin 6 milliGRAM(s) Oral at bedtime  metoprolol tartrate 25 milliGRAM(s) Oral two times a day  pantoprazole    Tablet 40 milliGRAM(s) Oral before breakfast  polyethylene glycol 3350 17 Gram(s) Oral daily  senna 2 Tablet(s) Oral at bedtime  zinc oxide 40% Paste 1 Application(s) Topical two times a day    MEDICATIONS  (PRN):  acetaminophen     Tablet .. 650 milliGRAM(s) Oral every 6 hours PRN Temp greater or equal to 38C (100.4F), Mild Pain (1 - 3)  bisacodyl Oral Tab/Cap - Peds 5 milliGRAM(s) Oral daily PRN Constipation  dextrose Oral Gel 15 Gram(s) Oral once PRN Blood Glucose LESS THAN 70 milliGRAM(s)/deciliter      Allergies    adhesives (Pruritus)  No Known Drug Allergies    Intolerances          VITALS  82y  Vital Signs Last 24 Hrs  T(C): 36.8 (23 Aug 2022 08:02), Max: 36.8 (23 Aug 2022 08:02)  T(F): 98.2 (23 Aug 2022 08:02), Max: 98.2 (23 Aug 2022 08:02)  HR: 73 (23 Aug 2022 08:02) (73 - 86)  BP: 116/65 (23 Aug 2022 08:02) (98/60 - 116/65)  BP(mean): --  RR: 16 (23 Aug 2022 08:02) (16 - 16)  SpO2: 96% (23 Aug 2022 08:02) (95% - 96%)    Parameters below as of 23 Aug 2022 08:02  Patient On (Oxygen Delivery Method): room air      Daily     Daily Weight in k.9 (22 Aug 2022 23:24)        RECENT LABS:                          9.9    4.10  )-----------( 217      ( 22 Aug 2022 06:50 )             30.7     08-22    145  |  109<H>  |  32<H>  ----------------------------<  152<H>  4.0   |  28  |  0.91    Ca    7.8<L>      22 Aug 2022 06:50                CAPILLARY BLOOD GLUCOSE      POCT Blood Glucose.: 135 mg/dL (23 Aug 2022 08:04)  POCT Blood Glucose.: 148 mg/dL (22 Aug 2022 21:10)  POCT Blood Glucose.: 133 mg/dL (22 Aug 2022 17:08)        Review of Systems:   · Additional ROS	Patient continues to look better, less anxious today. More alert, conversant, joking about boxes of pampers. Aware of family meeting today at 3 PM; says she is willing to do anything [for her recovery] as long as it's individualized to her [and not based on her family's experience from a previous member]    drinking well, BP improved. Had therapy early today at 7 AM, showered    Physical Exam:   · Constitutional Comments	alert, no discomfort, no headache.  · Respiratory	clear to auscultation bilaterally; no wheezes; no rales; no rhonchi  · Cardiovascular	regular rate and rhythm; S1 S2 present; no gallops; no rub; no murmur  · Gastrointestinal	soft; nontender; nondistended; normal active bowel sounds  · Mental Status	improved arousal and simple attention. mood overall improved  improved initiation and simple problem solving

## 2022-08-24 ENCOUNTER — TRANSCRIPTION ENCOUNTER (OUTPATIENT)
Age: 82
End: 2022-08-24

## 2022-08-24 PROBLEM — M54.9 DORSALGIA, UNSPECIFIED: Chronic | Status: ACTIVE | Noted: 2022-08-10

## 2022-08-24 PROBLEM — Z87.891 PERSONAL HISTORY OF NICOTINE DEPENDENCE: Chronic | Status: ACTIVE | Noted: 2022-08-10

## 2022-08-24 LAB
GLUCOSE BLDC GLUCOMTR-MCNC: 103 MG/DL — HIGH (ref 70–99)
GLUCOSE BLDC GLUCOMTR-MCNC: 130 MG/DL — HIGH (ref 70–99)
GLUCOSE BLDC GLUCOMTR-MCNC: 131 MG/DL — HIGH (ref 70–99)
GLUCOSE BLDC GLUCOMTR-MCNC: 180 MG/DL — HIGH (ref 70–99)
GLUCOSE BLDC GLUCOMTR-MCNC: 61 MG/DL — LOW (ref 70–99)
GLUCOSE BLDC GLUCOMTR-MCNC: 62 MG/DL — LOW (ref 70–99)

## 2022-08-24 PROCEDURE — 99232 SBSQ HOSP IP/OBS MODERATE 35: CPT

## 2022-08-24 RX ORDER — INSULIN LISPRO 100/ML
10 VIAL (ML) SUBCUTANEOUS
Qty: 1 | Refills: 0
Start: 2022-08-24 | End: 2022-09-22

## 2022-08-24 RX ORDER — INSULIN GLARGINE 100 [IU]/ML
12 INJECTION, SOLUTION SUBCUTANEOUS
Qty: 1 | Refills: 0
Start: 2022-08-24 | End: 2022-09-22

## 2022-08-24 RX ORDER — BRIVARACETAM 25 MG/1
1 TABLET, FILM COATED ORAL
Qty: 60 | Refills: 0
Start: 2022-08-24 | End: 2022-09-22

## 2022-08-24 RX ORDER — LANOLIN ALCOHOL/MO/W.PET/CERES
1 CREAM (GRAM) TOPICAL
Qty: 0 | Refills: 0 | DISCHARGE
Start: 2022-08-24 | End: 2022-09-22

## 2022-08-24 RX ORDER — LANOLIN ALCOHOL/MO/W.PET/CERES
2 CREAM (GRAM) TOPICAL
Qty: 60 | Refills: 0
Start: 2022-08-24 | End: 2022-09-22

## 2022-08-24 RX ORDER — SENNA PLUS 8.6 MG/1
2 TABLET ORAL
Qty: 60 | Refills: 0
Start: 2022-08-24 | End: 2022-09-22

## 2022-08-24 RX ORDER — ENOXAPARIN SODIUM 100 MG/ML
40 INJECTION SUBCUTANEOUS
Qty: 21 | Refills: 0
Start: 2022-08-24 | End: 2022-09-13

## 2022-08-24 RX ORDER — METOPROLOL TARTRATE 50 MG
1 TABLET ORAL
Qty: 60 | Refills: 0
Start: 2022-08-24 | End: 2022-09-22

## 2022-08-24 RX ORDER — ACETAMINOPHEN 500 MG
2 TABLET ORAL
Qty: 240 | Refills: 0
Start: 2022-08-24 | End: 2022-09-22

## 2022-08-24 RX ORDER — ATORVASTATIN CALCIUM 80 MG/1
1 TABLET, FILM COATED ORAL
Qty: 30 | Refills: 0
Start: 2022-08-24 | End: 2022-09-22

## 2022-08-24 RX ORDER — PANTOPRAZOLE SODIUM 20 MG/1
1 TABLET, DELAYED RELEASE ORAL
Qty: 30 | Refills: 0
Start: 2022-08-24 | End: 2022-09-22

## 2022-08-24 RX ORDER — GABAPENTIN 400 MG/1
2 CAPSULE ORAL
Qty: 60 | Refills: 0
Start: 2022-08-24 | End: 2022-09-22

## 2022-08-24 RX ORDER — DIVALPROEX SODIUM 500 MG/1
1 TABLET, DELAYED RELEASE ORAL
Qty: 90 | Refills: 0
Start: 2022-08-24 | End: 2022-09-22

## 2022-08-24 RX ADMIN — Medication 2: at 17:19

## 2022-08-24 RX ADMIN — Medication 6 MILLIGRAM(S): at 21:57

## 2022-08-24 RX ADMIN — Medication 7 UNIT(S): at 17:20

## 2022-08-24 RX ADMIN — GABAPENTIN 200 MILLIGRAM(S): 400 CAPSULE ORAL at 21:58

## 2022-08-24 RX ADMIN — ZINC OXIDE 1 APPLICATION(S): 200 OINTMENT TOPICAL at 18:53

## 2022-08-24 RX ADMIN — ZINC OXIDE 1 APPLICATION(S): 200 OINTMENT TOPICAL at 05:30

## 2022-08-24 RX ADMIN — DIVALPROEX SODIUM 500 MILLIGRAM(S): 500 TABLET, DELAYED RELEASE ORAL at 22:45

## 2022-08-24 RX ADMIN — SENNA PLUS 2 TABLET(S): 8.6 TABLET ORAL at 21:55

## 2022-08-24 RX ADMIN — INSULIN GLARGINE 12 UNIT(S): 100 INJECTION, SOLUTION SUBCUTANEOUS at 21:58

## 2022-08-24 RX ADMIN — Medication 1 DROP(S): at 14:26

## 2022-08-24 RX ADMIN — Medication 25 MILLIGRAM(S): at 05:27

## 2022-08-24 RX ADMIN — ENOXAPARIN SODIUM 40 MILLIGRAM(S): 100 INJECTION SUBCUTANEOUS at 18:52

## 2022-08-24 RX ADMIN — ATORVASTATIN CALCIUM 40 MILLIGRAM(S): 80 TABLET, FILM COATED ORAL at 21:56

## 2022-08-24 RX ADMIN — Medication 25 MILLIGRAM(S): at 18:52

## 2022-08-24 RX ADMIN — BRIVARACETAM 50 MILLIGRAM(S): 25 TABLET, FILM COATED ORAL at 18:52

## 2022-08-24 RX ADMIN — DIVALPROEX SODIUM 500 MILLIGRAM(S): 500 TABLET, DELAYED RELEASE ORAL at 14:25

## 2022-08-24 RX ADMIN — BRIVARACETAM 50 MILLIGRAM(S): 25 TABLET, FILM COATED ORAL at 05:28

## 2022-08-24 RX ADMIN — PANTOPRAZOLE SODIUM 40 MILLIGRAM(S): 20 TABLET, DELAYED RELEASE ORAL at 05:27

## 2022-08-24 RX ADMIN — Medication 1 DROP(S): at 05:27

## 2022-08-24 RX ADMIN — DIVALPROEX SODIUM 500 MILLIGRAM(S): 500 TABLET, DELAYED RELEASE ORAL at 05:27

## 2022-08-24 RX ADMIN — Medication 7 UNIT(S): at 09:16

## 2022-08-24 RX ADMIN — Medication 1 DROP(S): at 21:56

## 2022-08-24 NOTE — PROGRESS NOTE ADULT - ASSESSMENT
Patient is a 81 YO female with PMHx HTN, DM, HLD, CAD, MI, PAD, peripheral neuropathy, former smoker, who presented to [facility] ED on 7/19/22 with brain mass s/p Right craniotomy/resection 7/27/22, Pathology +NSC carcinoma. Post-op course significant for STEMI, hyperkalemia, encephalopathy, UTI, new onset a-fib.    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 Recommend increase to full dose of lovenox 1mg/kg BID once surgically feasible. Will need lovenox teaching for dc   - continue Comprehensive Rehab Program: PT/OT/ST, 3hours daily and 5 days weekly  - recreation therapy, psychology supportive services  - Precautions: cardiac, DM, fall, sensory, SZ, AMS, aspiration, AC    # dry eyes/proliferators diabetic retinopathy/macular edema  - artificial tears TID  - stable  - private ophthalmologist Dr. Grady on dc    # HTN/CAD/A-fib  - c/w ASA  - Eliquis 5mg BID  - Metoprolol 25mg BID  - Lipitor 40mg daily  - 99/60 - 111/68 HR 77-97 8/24    # DMII  - a1c 8.7 on 7/20  - ISS and FS  - lantus / admleog  - FS . f/u hospitalist re: dc medication regimen    # leukocytosis: resolved  - likely due to steroids, UTI  - WBC 4.10 8/22  - CBC 8/25    # RI- improved  - encourage fluids  - BUn/Cr 48/1.27 8/18--> 32/0.91 8/22  - continue with increased po fluids, patient maintaining through oral intake  - CMP 8/25      # klebsiella UTI  - s/p cipro 500 mg q12 x 3 days    # Thyroid Nodule  - f/u with Endocrinology on dc    # PE  - lovenox PPX dose until cleared by NSGY    # Pain management  - Tylenol PRN  - gabapentin    # GI ppx  - Protonix 40mg  - Senna, miralax PRN    # FEN   - Diet: Consistent Carbohydrate Diabetic     # Skin:  - Skin on admission: stage 1 pressure ulcer on LLE lateral heel and right buttock healed    # DVT ppx  - lovenox    # Case discussed in IDT rounds 8/22:  - mild deficits in cognition, reduced attention, improved participation, ambulates 50-75 feet RW and CG, negotiates 4 steps with bilateral HR and min assist/CG  - goals: supervision bADLs, supervision transfers and ambulation, CG stairs Will need supervision 24 x 7 on dc due to cognitive deficits  - patient will need appointments with radiation oncology for palliative radiation therapy for the CNS metastasis.  Will also need set up with a lung oncologist at [facility] upon discharge to discuss palliative chemotherapy and immunotherapy.    - family meeting took place 8/23 Son Dick:  [phone], patient, and daughter-in-law Suzan  - for dc home 8/26 with 24 hour supervision and home care referral OT, PT, SLP, family pickup around 3 PM  - meds: maintenance through mail order (son to provide list of meds)  - new medications: [facility] [address], [place]  - DME: shower chair, raised toilet seat, transport WC, RW      # LABS  CBC BMP 8/25  lovenox teaching     Patient is a 83 YO female with PMHx HTN, DM, HLD, CAD, MI, PAD, peripheral neuropathy, former smoker, who presented to [facility] ED on 7/19/22 with brain mass s/p Right craniotomy/resection 7/27/22, Pathology +NSC carcinoma. Post-op course significant for STEMI, hyperkalemia, encephalopathy, UTI, new onset a-fib.    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 Recommend increase to full dose of lovenox 1mg/kg BID once surgically feasible. Will need lovenox teaching for dc   - continue Comprehensive Rehab Program: PT/OT/ST, 3hours daily and 5 days weekly  - recreation therapy, psychology supportive services  - Precautions: cardiac, DM, fall, sensory, SZ, AMS, aspiration, AC    # dry eyes/proliferators diabetic retinopathy/macular edema  - artificial tears TID  - stable  - private ophthalmologist Dr. Grady on dc    # HTN/CAD/A-fib  - c/w ASA  - Eliquis 5mg BID  - Metoprolol 25mg BID  - Lipitor 40mg daily  - 99/60 - 111/68 HR 77-97 8/24    # DMII  - a1c 8.7 on 7/20  - ISS and FS  - lantus / admleog  - FS . f/u hospitalist re: dc medication regimen    # leukocytosis: resolved  - likely due to steroids, UTI  - WBC 4.10 8/22  - CBC 8/25    # RI- improved  - encourage fluids  - BUn/Cr 48/1.27 8/18--> 32/0.91 8/22  - voiding well, dc bladder scan  - CMP 8/25      # klebsiella UTI  - s/p cipro 500 mg q12 x 3 days    # Thyroid Nodule  - f/u with Endocrinology on dc    # PE  - lovenox PPX dose until cleared by NSGY for full dose, to f/u as outpatient    # Pain management  - Tylenol PRN  - gabapentin    # GI ppx  - Protonix 40mg  - Senna, miralax PRN    # FEN   - Diet: Consistent Carbohydrate Diabetic     # Skin:  - Skin on admission: stage 1 pressure ulcer on LLE lateral heel and right buttock healed    # DVT ppx  - lovenox    # Case discussed in IDT rounds 8/22:  - mild deficits in cognition, reduced attention, improved participation, ambulates 50-75 feet RW and CG, negotiates 4 steps with bilateral HR and min assist/CG  - goals: supervision bADLs, supervision transfers and ambulation, CG stairs Will need supervision 24 x 7 on dc due to cognitive deficits  - patient will need appointments with radiation oncology for palliative radiation therapy for the CNS metastasis.  Will also need set up with a lung oncologist at [facility] upon discharge to discuss palliative chemotherapy and immunotherapy.    - family meeting took place 8/23 Son Dick:  [phone], patient, and daughter-in-law Suzan  - for dc home 8/26 with 24 hour supervision and home care referral OT, PT, SLP, family pickup around 3 PM  - meds: maintenance through mail order (son to provide list of meds)  - new medications: [facility] [address] [place], [place]  - DME: shower chair, raised toilet seat, transport WC, RW      # LABS  CBC BMP 8/25  lovenox teaching

## 2022-08-24 NOTE — DISCHARGE NOTE PROVIDER - PROVIDER TOKENS
PROVIDER:[TOKEN:[[id number]],FOLLOWUP:[2 weeks]],PROVIDER:[TOKEN:[[id number]],FOLLOWUP:[2 weeks]],PROVIDER:[TOKEN:[[id number]],FOLLOWUP:[1 week]],PROVIDER:[TOKEN:[[id number]],FOLLOWUP:[1 week]],PROVIDER:[TOKEN:[[id number]],FOLLOWUP:[1 month]],PROVIDER:[TOKEN:[[id number]],FOLLOWUP:[2 weeks]]

## 2022-08-24 NOTE — PROGRESS NOTE ADULT - REASON FOR ADMISSION
meetastatic adenoCA to brain s/p resection 7/27/22 metastatic adenoCA to brain s/p resection 7/27/22

## 2022-08-24 NOTE — PROGRESS NOTE ADULT - ASSESSMENT
82 year old F PMH HTN, DM, HLD, CAD, MI, PAD, peripheral neuropathy, former smoker presented to [facility] ED on 7/19/22 with brain mass s/p Right craniotomy/resection 7/27/22. 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, primary lung  #ADL impairment  - MR brain 7/20/22 with Right temporal lobe peripherally enhancing, centrally necrotic 3.1 x 2.6 cm mass with surrounding vasogenic edema with METS  - s/p right craniotomy for resection of brain mass 7/27/22  - Bronchial biopsy consistent with poorly differentiated adenocarcinoma 7/20/22  - continue with Brivaracetam 50mg BID and decadron taper  - continue depakote 500mg q8hrs for seizure ppx  - will need outpatient PET/CT scan, radiation therapy and further evaluation with heme/onc    #PERCY  - Resolved  - encourage PO fluid intake    #orthostatic  - Off antihypertensives losartan and amlodipine   - encourage PO intake  - continue to monitor     #CAD  #STEMI  #new onset afib  #HTN  #HLD  - echo 8/8/22 with EF 60-65%, basal inferolateral wall, the basal inferior wall, the basal anterolateral wall, and the mid inferior wall are hypokinetic  - not candidate for PCI given risks > benefit as per cath team due to recent craniotomy   - continue ASA/statin/Lopressor  - continue eliquis 5mg BID  - Off antihypertensives losartan and amlodipine     #DM type 2  - HbA1C 8.7  -Lantus 12U and continue admelog 7U TID qAC    #history of PE  - found to have RML segmental PE on 8/3  - continue Eliquis 5mg BID    #anemia  - H/H stable  - anemia of chronic disease due to malignancy as per heme    #DVT ppx  - on Eliquis

## 2022-08-24 NOTE — PROGRESS NOTE ADULT - COMMENTS
Patient sitting in wheelchair. Eyes open spontaneously, did not remember when her last BM was. Toileted this morning but only urination. Denies H/A or dizziness, but noted to have low SBP 90-100s with nursing and in therapy. No CP or SOB.     Sustained eye opening throughout exam, but reduced complex comprehension and processing information. Lantus increased for elevated FS and patient encouraged to increase fluids
Patient has been compliant with increased po fluids over the weekend per nursing staff; also able to verbalize recommendation. She looks more alert, increased participation and progress in therapy, with stabilized BP. She does express anxiety about dc, her medical condition, and also expresses increased anxiety as a result of caregiver worries and recommendations which she feels is not suited for her (e.g buying 6 boxes of incontinence diapers). agreeable to family meeting
Patient stable, denies feeling thirsty today. famly has confirmed availability of aide and can take patient home Friday. Discussed with patient who is agreeable. She is more interactive overall, makes jokes with dry sense of humor, and has improved attention and endurance. No new complaints, no B/B issues    voiding well, will dc bladder scan

## 2022-08-24 NOTE — DISCHARGE NOTE PROVIDER - NSDCFUSCHEDAPPT_GEN_ALL_CORE_FT
Sudhakar Ramon  [facility] Physician [facility]  [facility]  Scheduled Appointment: 08/31/2022    Willard Loomis Physician [facility]  NEUROSURG [address] [facility]  Scheduled Appointment: 09/22/2022     Sudhakar Ramon  [facility] Physician [facility]  [facility]  Scheduled Appointment: 08/31/2022    Lisker, Gita N  [facility] Physician [facility]  PULMMED [address]  Scheduled Appointment: 09/01/2022    Willard Loomis  [facility] Physician [facility]  NEUROSURG [address] [facility]  Scheduled Appointment: 09/22/2022

## 2022-08-24 NOTE — DISCHARGE NOTE PROVIDER - NSDCMRMEDTOKEN_GEN_ALL_CORE_FT
acetaminophen 325 mg oral tablet: 2 tab(s) orally every 6 hours, As needed, Temp greater or equal to 38C (100.4F), Mild Pain (1 - 3)  atorvastatin 40 mg oral tablet: 1 tab(s) orally once a day (at bedtime)  bisacodyl 5 mg oral delayed release tablet: 1 tab(s) orally once a day, As needed, Constipation  divalproex sodium 500 mg oral delayed release tablet: 1 tab(s) orally every 8 hours  enoxaparin 40 mg/0.4 mL injectable solution: 40 milligram(s) subcutaneously once a day   gabapentin 100 mg oral capsule: 2 cap(s) orally once a day (at bedtime)  insulin glargine 100 units/mL subcutaneous solution: 12 unit(s) subcutaneous once a day (at bedtime)  insulin lispro 100 units/mL injectable solution: 10 unit(s) subcutaneous 3 times a day (with meals)  melatonin 3 mg oral tablet: 2 tab(s) orally once a day (at bedtime)  metoprolol tartrate 25 mg oral tablet: 1 tab(s) orally 2 times a day  ocular lubricant ophthalmic solution: 1 drop(s) to each affected eye 3 times a day  pantoprazole 40 mg oral delayed release tablet: 1 tab(s) orally once a day (before a meal)  senna leaf extract oral tablet: 2 tab(s) orally once a day (at bedtime)   acetaminophen 325 mg oral tablet: 2 tab(s) orally every 6 hours, As needed, Temp greater or equal to 38C (100.4F), Mild Pain (1 - 3)  atorvastatin 40 mg oral tablet: 1 tab(s) orally once a day (at bedtime)  bisacodyl 5 mg oral delayed release tablet: 1 tab(s) orally once a day, As needed, Constipation  divalproex sodium 500 mg oral delayed release tablet: 1 tab(s) orally every 8 hours  enoxaparin 40 mg/0.4 mL injectable solution: 40 milligram(s) subcutaneously once a day   gabapentin 100 mg oral capsule: 2 cap(s) orally once a day (at bedtime)  Lantus Solostar Pen 100 units/mL subcutaneous solution: 12 unit(s) subcutaneous once a day (at bedtime)  melatonin 3 mg oral tablet: 2 tab(s) orally once a day (at bedtime)  metFORMIN 500 mg oral tablet: 1 tab(s) orally 2 times a day  metoprolol tartrate 25 mg oral tablet: 1 tab(s) orally 2 times a day  ocular lubricant ophthalmic solution: 1 drop(s) to each affected eye 3 times a day  pantoprazole 40 mg oral delayed release tablet: 1 tab(s) orally once a day (before a meal)  senna leaf extract oral tablet: 2 tab(s) orally once a day (at bedtime)   acetaminophen 325 mg oral tablet: 2 tab(s) orally every 6 hours, As needed, Temp greater or equal to 38C (100.4F), Mild Pain (1 - 3)  atorvastatin 40 mg oral tablet: 1 tab(s) orally once a day (at bedtime)  bisacodyl 5 mg oral delayed release tablet: 1 tab(s) orally once a day, As needed, Constipation  brivaracetam 50 mg oral tablet: 1 tab(s) orally every 12 hours MDD:100mg  divalproex sodium 500 mg oral delayed release tablet: 1 tab(s) orally every 8 hours  enoxaparin 40 mg/0.4 mL injectable solution: 40 milligram(s) subcutaneously once a day   gabapentin 100 mg oral capsule: 2 cap(s) orally once a day (at bedtime)  Lantus Solostar Pen 100 units/mL subcutaneous solution: 12 unit(s) subcutaneous once a day (at bedtime)  melatonin 3 mg oral tablet: 2 tab(s) orally once a day (at bedtime)  metFORMIN 500 mg oral tablet: 1 tab(s) orally 2 times a day  metoprolol tartrate 25 mg oral tablet: 1 tab(s) orally 2 times a day  ocular lubricant ophthalmic solution: 1 drop(s) to each affected eye 3 times a day  pantoprazole 40 mg oral delayed release tablet: 1 tab(s) orally once a day (before a meal)  senna leaf extract oral tablet: 2 tab(s) orally once a day (at bedtime)   acetaminophen 325 mg oral tablet: 2 tab(s) orally every 6 hours, As needed, Temp greater or equal to 38C (100.4F), Mild Pain (1 - 3)  atorvastatin 40 mg oral tablet: 1 tab(s) orally once a day (at bedtime)  bisacodyl 5 mg oral delayed release tablet: 1 tab(s) orally once a day, As needed, Constipation  divalproex sodium 500 mg oral delayed release tablet: 1 tab(s) orally every 8 hours  enoxaparin 40 mg/0.4 mL injectable solution: 40 milligram(s) subcutaneously once a day   gabapentin 100 mg oral capsule: 2 cap(s) orally once a day (at bedtime)  Keppra 500 mg oral tablet: 1 tab(s) orally 2 times a day   Lantus Solostar Pen 100 units/mL subcutaneous solution: 12 unit(s) subcutaneous once a day (at bedtime)  melatonin 3 mg oral tablet: 2 tab(s) orally once a day (at bedtime)  metFORMIN 500 mg oral tablet: 1 tab(s) orally 2 times a day  metoprolol tartrate 25 mg oral tablet: 1 tab(s) orally 2 times a day  ocular lubricant ophthalmic solution: 1 drop(s) to each affected eye 3 times a day  pantoprazole 40 mg oral delayed release tablet: 1 tab(s) orally once a day (before a meal)  senna leaf extract oral tablet: 2 tab(s) orally once a day (at bedtime)

## 2022-08-24 NOTE — DISCHARGE NOTE PROVIDER - CARE PROVIDER_API CALL
Willard Loomis; YUKO; MS)  Neurosurgery  [address], [address]  [place] [address]  Phone: [phone]  Fax: [phone]  Follow Up Time: 2 weeks    Yaron Alexander)  Hematology; Internal Medicine; Medical Oncology  [address], [address]  [place] [address]  Phone: [phone]  Fax: [phone]  Follow Up Time: 2 weeks    Rafael Funes  RADIATION ONCOLOGY  [address] [place] [address]  Phone: [phone]  Fax: [phone]  Follow Up Time: 1 week    Satish Hopkins)  Internal Medicine; Pulmonary Disease  [address], [address]  [place] [address]  Phone: [phone]  Fax: [phone]  Follow Up Time: 1 week    Svetlana Saucedo  Physical Medicine and Rehabilitation  [address] [place] [address]  Phone: [phone]  Fax: [phone]  Follow Up Time: 1 month    Satish Alcaraz)  Cardiovascular Disease; Interventional Cardiology; Nuclear Cardiology  [address], [address]  [place] [address]  Phone: [phone]  Fax: [phone]  Follow Up Time: 2 weeks

## 2022-08-24 NOTE — DISCHARGE NOTE PROVIDER - DETAILS OF MALNUTRITION DIAGNOSIS/DIAGNOSES
This patient has been assessed with a concern for Malnutrition and was treated during this hospitalization for the following Nutrition diagnosis/diagnoses:     -  08/12/2022: Severe protein-calorie malnutrition

## 2022-08-24 NOTE — PROGRESS NOTE ADULT - RESPIRATORY
good- effort/normal/clear to auscultation bilaterally/no wheezes/no rales/no rhonchi
normal/clear to auscultation bilaterally/no wheezes/no rales/no rhonchi
normal/clear to auscultation bilaterally/no wheezes/no rales/no rhonchi

## 2022-08-24 NOTE — DISCHARGE NOTE PROVIDER - HOSPITAL COURSE
HPI:  Patient is a 83 YO female RH dominant with PMHx HTN, DM, HLD, CAD, MI, PAD, peripheral neuropathy, former smoker, who presented to [facility] ED on 7/19/22 with chronic back pain and gait/balance issues x 4 months. She was found to have a brain mass on imaging while undergoing work up by her orthopedist (Dr. Castro).    MR brain 7/20/22 +Right temporal lobe peripherally enhancing, centrally necrotic 3.1 x 2.6 cm mass with surrounding vasogenic edema. 8 mm leftward midline shift. Right uncal herniation. Additional left parafalcine 1.3 x 0.9 cm rim-enhancing cystic lesion with mild surrounding vasogenic edema. Tiny 0.3 x 0.3cm rim-enhancing lesion within the left frontal cortex concerning for metastatic disease. CT chest showed an obstructing left upper lobe endobronchial lesion. 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 Patient underwent Right craniotomy for resection of brain mass 7/27/22, Pathology +NSC carcinoma (favor adeno). Plans for out-pt PET/CT scan, RT, and consideration for systemic therapy.     Post-op course significant for STEMI 8/1/22. Not a candidate for cath secondary to comorbidities and recent surgery. She was started on ASA/ heparin gtt and a betablocker. TTE 7/22 +normal LV internal dimensions with discrete upper septal hypertrophy. EF 55%, inferolateral wall is akinetic. She was found to have RML segmental PE 8./3. Hospital course also significant for hyperkalemia, and encephalopathy, likely secondary to UTI/early urosepsis (treated with 5 days of Zosyn). EEG +Right temporal sharp waves, no seizures; started on Briviact 8/3/22. She developed new onset Afib w RVR on 8/4/22. with elevated troponin; metoprolol was increased, and heparin anticoagulation switched to Eliquis on 8/9/22.    Patient was evaluated by PM&R and therapy for functional deficits, gait/ADL impairments and acute rehabilitation was recommended. Patient was medically optimized for discharge to [facility] IRF 8/10/22. (10 Aug 2022 13:45)      Patient was evaluated by PM&R and therapy for gait/ADL impairments and recommended acute rehabilitation. Patient was medically optimized for discharge to [facility] on 8/10/22. Admitted with gait instability, ADL, and functional impairments.     Rehab course significant for an episode of UTI which was treated with ciprofloxacin and an PERCY due dehydration with most recent BUN/CR 32/0.91. 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  Will also need set up with a lung oncologist at [facility] upon discharge to discuss palliative chemotherapy and immunotherapy.        Patient has mild deficits in cognition, reduced attention, improved participation, amulates 50-75ft RW and CG, negotiates 4 steps with bilateral HR and min assist/CG     All other medical co-morbidities were stable. Patient tolerated course of inpatient PT/OT/SLP rehab with significant improvements and met rehab goals prior to discharge. Patient was medically cleared on 8/26/22 for discharge to home with 24 hour supervision and home care referral, OT, PT and SLP. HPI:  Patient is a 81 YO female RH dominant with PMHx HTN, DM, HLD, CAD, MI, PAD, peripheral neuropathy, former smoker, who presented to [facility] ED on 7/19/22 with chronic back pain and gait/balance issues x 4 months. She was found to have a brain mass on imaging while undergoing work up by her orthopedist (Dr. Castro).    MR brain 7/20/22 +Right temporal lobe peripherally enhancing, centrally necrotic 3.1 x 2.6 cm mass with surrounding vasogenic edema. 8 mm leftward midline shift. Right uncal herniation. Additional left parafalcine 1.3 x 0.9 cm rim-enhancing cystic lesion with mild surrounding vasogenic edema. Tiny 0.3 x 0.3cm rim-enhancing lesion within the left frontal cortex concerning for metastatic disease. CT chest showed an obstructing left upper lobe endobronchial lesion. 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 Patient underwent Right craniotomy for resection of brain mass 7/27/22, Pathology +NSC carcinoma (favor adeno). Plans for out-pt PET/CT scan, RT, and consideration for systemic therapy.     Post-op course significant for STEMI 8/1/22. Not a candidate for cath secondary to comorbidities and recent surgery. She was started on ASA/ heparin gtt and a betablocker. TTE 7/22 +normal LV internal dimensions with discrete upper septal hypertrophy. EF 55%, inferolateral wall is akinetic. She was found to have RML segmental PE 8./3. Hospital course also significant for hyperkalemia, and encephalopathy, likely secondary to UTI/early urosepsis (treated with 5 days of Zosyn). EEG +Right temporal sharp waves, no seizures; started on Briviact 8/3/22. She developed new onset Afib w RVR on 8/4/22. with elevated troponin; metoprolol was increased, and heparin anticoagulation switched to Eliquis on 8/9/22.    Patient was evaluated by PM&R and therapy for functional deficits, gait/ADL impairments and acute rehabilitation was recommended. Patient was medically optimized for discharge to [facility] IRF 8/10/22. (10 Aug 2022 13:45)      Patient was evaluated by PM&R and therapy for gait/ADL impairments and recommended acute rehabilitation. Patient was medically optimized for discharge to [facility] on 8/10/22. Admitted with gait instability, ADL, and functional impairments.     Rehab course significant for an episode of UTI which was treated with ciprofloxacin and an PERCY due dehydration with most recent BUN/CR 32/0.91. 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  Will also need set up with a lung oncologist at [facility] upon discharge to discuss palliative chemotherapy and immunotherapy.  She is discharged on prophylactic lovenox dose, to be increased to full dose for treatment of her PE once she is cleared by NSGY. Heme onc followed patient while she was in the hospital. on discharge, she required supervision bADLs, supervision transfers and ambulation, CG stairs Will need supervision 24 x 7 on dc due to cognitive deficits; caregiver training took place, as well as family meeting, and family will arrange for aide on dc    All other medical co-morbidities were stable. Patient tolerated course of inpatient PT/OT/SLP rehab with significant improvements and met rehab goals prior to discharge. Patient was medically cleared on 8/26/22 for discharge to home with 24 hour supervision and home care referral, OT, PT and SLP. HPI:  Patient is a 81 YO female RH dominant with PMHx HTN, DM, HLD, CAD, MI, PAD, peripheral neuropathy, former smoker, who presented to [facility] ED on 7/19/22 with chronic back pain and gait/balance issues x 4 months. She was found to have a brain mass on imaging while undergoing work up by her orthopedist (Dr. Castro).    MR brain 7/20/22 +Right temporal lobe peripherally enhancing, centrally necrotic 3.1 x 2.6 cm mass with surrounding vasogenic edema. 8 mm leftward midline shift. Right uncal herniation. Additional left parafalcine 1.3 x 0.9 cm rim-enhancing cystic lesion with mild surrounding vasogenic edema. Tiny 0.3 x 0.3cm rim-enhancing lesion within the left frontal cortex concerning for metastatic disease. CT chest showed an obstructing left upper lobe endobronchial lesion. 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 Patient underwent Right craniotomy for resection of brain mass 7/27/22, Pathology +NSC carcinoma (favor adeno). Plans for out-pt PET/CT scan, RT, and consideration for systemic therapy.     Post-op course significant for STEMI 8/1/22. Not a candidate for cath secondary to comorbidities and recent surgery. She was started on ASA/ heparin gtt and a betablocker. TTE 7/22 +normal LV internal dimensions with discrete upper septal hypertrophy. EF 55%, inferolateral wall is akinetic. She was found to have RML segmental PE 8./3. Hospital course also significant for hyperkalemia, and encephalopathy, likely secondary to UTI/early urosepsis (treated with 5 days of Zosyn). EEG +Right temporal sharp waves, no seizures; started on Briviact 8/3/22. She developed new onset Afib w RVR on 8/4/22. with elevated troponin; metoprolol was increased, and heparin anticoagulation switched to Eliquis on 8/9/22.    Patient was evaluated by PM&R and therapy for functional deficits, gait/ADL impairments and acute rehabilitation was recommended. Patient was medically optimized for discharge to [facility] IRF 8/10/22. (10 Aug 2022 13:45)      Patient was evaluated by PM&R and therapy for gait/ADL impairments and recommended acute rehabilitation. Patient was medically optimized for discharge to [facility] on 8/10/22. Admitted with gait instability, ADL, and functional impairments.     Rehab course significant for an episode of UTI which was treated with ciprofloxacin and an PERCY due dehydration with most recent BUN/CR 32/0.91. 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  Will also need set up with a lung oncologist at [facility] upon discharge to discuss palliative chemotherapy and immunotherapy.  She is discharged on prophylactic lovenox dose, to be increased to full dose for treatment of her PE once she is cleared by NSGY. Heme onc followed patient while she was in the hospital. on discharge, she required supervision bADLs, supervision transfers and ambulation, CG stairs Will need supervision 24 x 7 on dc due to cognitive deficits; caregiver training took place, as well as family meeting, and family will arrange for aide on dc    Of note, patient discharged on Briviact for seizure prophylaxis, however this medication is not covered by patient's insurance. Discussed with patient's Neurosurgeon Dr. Loomis on 8/27/22, who said can change Briviact to Keppra until follow up in his office outpatient.     All other medical co-morbidities were stable. Patient tolerated course of inpatient PT/OT/SLP rehab with significant improvements and met rehab goals prior to discharge. Patient was medically cleared on 8/26/22 for discharge to home with 24 hour supervision and home care referral, OT, PT and SLP. HPI:  Patient is a 81 YO female RH dominant with PMHx HTN, DM, HLD, CAD, MI, PAD, peripheral neuropathy, former smoker, who presented to [facility] ED on 7/19/22 with chronic back pain and gait/balance issues x 4 months. She was found to have a brain mass on imaging while undergoing work up by her orthopedist (Dr. Castro).    MR brain 7/20/22 +Right temporal lobe peripherally enhancing, centrally necrotic 3.1 x 2.6 cm mass with surrounding vasogenic edema. 8 mm leftward midline shift. Right uncal herniation. Additional left parafalcine 1.3 x 0.9 cm rim-enhancing cystic lesion with mild surrounding vasogenic edema. Tiny 0.3 x 0.3cm rim-enhancing lesion within the left frontal cortex concerning for metastatic disease. CT chest showed an obstructing left upper lobe endobronchial lesion. 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 Patient underwent Right craniotomy for resection of brain mass 7/27/22, Pathology +NSC carcinoma (favor adeno). Plans for out-pt PET/CT scan, RT, and consideration for systemic therapy.     Post-op course significant for STEMI 8/1/22. Not a candidate for cath secondary to comorbidities and recent surgery. She was started on ASA/ heparin gtt and a betablocker. TTE 7/22 +normal LV internal dimensions with discrete upper septal hypertrophy. EF 55%, inferolateral wall is akinetic. She was found to have RML segmental PE 8./3. Hospital course also significant for hyperkalemia, and encephalopathy, likely secondary to UTI/early urosepsis (treated with 5 days of Zosyn). EEG +Right temporal sharp waves, no seizures; started on Briviact 8/3/22. She developed new onset Afib w RVR on 8/4/22. with elevated troponin; metoprolol was increased, and heparin anticoagulation switched to Eliquis on 8/9/22.    Patient was evaluated by PM&R and therapy for functional deficits, gait/ADL impairments and acute rehabilitation was recommended. Patient was medically optimized for discharge to [facility] IRF 8/10/22. (10 Aug 2022 13:45)      Patient was evaluated by PM&R and therapy for gait/ADL impairments and recommended acute rehabilitation. Patient was medically optimized for discharge to [facility] on 8/10/22. Admitted with gait instability, ADL, and functional impairments.     Rehab course significant for an episode of UTI which was treated with ciprofloxacin and an PERCY due dehydration with most recent BUN/CR 32/0.91. 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  Will also need set up with a lung oncologist at [facility] upon discharge to discuss palliative chemotherapy and immunotherapy.  She is discharged on prophylactic lovenox dose, to be increased to full dose for treatment of her PE once she is cleared by NSGY. Heme onc followed patient while she was in the hospital. on discharge, she required supervision bADLs, supervision transfers and ambulation, CG stairs Will need supervision 24 x 7 on dc due to cognitive deficits; caregiver training took place, as well as family meeting, and family will arrange for aide on dc    Of note, patient discharged on Briviact for seizure prophylaxis, however this medication is not covered by patient's insurance. Discussed with patient's Neurosurgeon Dr. Loomis on 8/27/22, who said can change Briviact to Keppra until follow up in his office outpatient. Spoke w/ patient's granddaugther Lexi who agreed w/ plan.    All other medical co-morbidities were stable. Patient tolerated course of inpatient PT/OT/SLP rehab with significant improvements and met rehab goals prior to discharge. Patient was medically cleared on 8/26/22 for discharge to home with 24 hour supervision and home care referral, OT, PT and SLP.

## 2022-08-24 NOTE — DISCHARGE NOTE PROVIDER - NSDCCPCAREPLAN_GEN_ALL_CORE_FT
PRINCIPAL DISCHARGE DIAGNOSIS  Diagnosis: S/P craniotomy  Assessment and Plan of Treatment: Brain mass s/p R craniotomy/resection. Continue bivaracetam and depakote for seizure ppx. Follow up with neurosurgery for rest of managment. Continue lovenox 40mg QD for dvt ppx.      SECONDARY DISCHARGE DIAGNOSES  Diagnosis: History of coronary artery disease  Assessment and Plan of Treatment: Continue metoprolol and atorvastatin. Follow up with PCP for rest of management.    Diagnosis: Diabetes mellitus, stable  Assessment and Plan of Treatment: Continue long and short acting insulin regimen. Follow up with PCP regarding rest of management.     PRINCIPAL DISCHARGE DIAGNOSIS  Diagnosis: S/P craniotomy  Assessment and Plan of Treatment: Brain mass s/p R craniotomy/resection. Continue depakote for seizure ppx. Follow up with neurosurgery for rest of managment. Continue lovenox 40mg QD for dvt ppx. Brivaict for siezure ppx changed to keppra for outpatient due to insurance coveraged, cleared by your neurosurgeon Dr. Loomis      SECONDARY DISCHARGE DIAGNOSES  Diagnosis: History of coronary artery disease  Assessment and Plan of Treatment: Continue metoprolol and atorvastatin. Follow up with PCP for rest of management.    Diagnosis: Diabetes mellitus, stable  Assessment and Plan of Treatment: Continue long and short acting insulin regimen. Follow up with PCP regarding rest of management.

## 2022-08-24 NOTE — PROGRESS NOTE ADULT - GASTROINTESTINAL
normal/soft/nontender/nondistended/normal active bowel sounds

## 2022-08-24 NOTE — PROGRESS NOTE ADULT - SUBJECTIVE AND OBJECTIVE BOX
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 She was found to have a brain mass on imaging while undergoing work up by her orthopedist (Dr. Castro).    MR brain 22 +Right temporal lobe peripherally enhancing, centrally necrotic 3.1 x 2.6 cm mass with surrounding vasogenic edema. 8 mm leftward midline shift. Right uncal herniation. Additional left parafalcine 1.3 x 0.9 cm rim-enhancing cystic lesion with mild surrounding vasogenic edema. Tiny 0.3 x 0.3cm rim-enhancing lesion within the left frontal cortex concerning for metastatic disease. CT chest showed an obstructing left upper lobe endobronchial lesion. She had bronchial biopsy, consistent with poorly differentiated adenocarcinoma 22. Patient underwent Right craniotomy for resection of brain mass 22, Pathology +NSC carcinoma (favor adeno). Plans for out-pt PET/CT scan, RT, and consideration for systemic therapy.     Post-op course significant for STEMI 22. Not a candidate for cath secondary to comorbidities and recent surgery. She was started on ASA/ heparin gtt and a betablocker. TTE  +normal LV internal dimensions with discrete upper septal hypertrophy. EF 55%, inferolateral wall is akinetic. She was found to have RML segmental PE 8./3. Hospital course also significant for hyperkalemia, and encephalopathy, likely secondary to UTI/early urosepsis (treated with 5 days of Zosyn). EEG +Right temporal sharp waves, no seizures; started on Briviact 8/3/22. She developed new onset Afib w RVR on 22. with elevated troponin; metoprolol was increased, and heparin anticoagulation switched to Eliquis on 22.    Patient was evaluated by PM&R and therapy for functional deficits, gait/ADL impairments and acute rehabilitation was recommended. Patient was medically optimized for discharge to [facility] IRF 8/10/22. (10 Aug 2022 13:45)      PAST MEDICAL & SURGICAL HISTORY:  Hypertension      Coronary artery disease      Hyperlipidemia      Peripheral artery disease      Myocardial infarct      Peripheral neuropathy      Diabetes      Chronic back pain      Former smoker      PAD (peripheral artery disease)      CAD (coronary artery disease)  stents          MEDICATIONS  (STANDING):  artificial  tears Solution 1 Drop(s) Both EYES three times a day  atorvastatin 40 milliGRAM(s) Oral at bedtime  brivaracetam 50 milliGRAM(s) Oral every 12 hours  dextrose 5%. 1000 milliLiter(s) (100 mL/Hr) IV Continuous <Continuous>  dextrose 5%. 1000 milliLiter(s) (50 mL/Hr) IV Continuous <Continuous>  dextrose 50% Injectable 25 Gram(s) IV Push once  dextrose 50% Injectable 12.5 Gram(s) IV Push once  dextrose 50% Injectable 25 Gram(s) IV Push once  diVALproex  milliGRAM(s) Oral every 8 hours  enoxaparin Injectable 40 milliGRAM(s) SubCutaneous <User Schedule>  gabapentin 200 milliGRAM(s) Oral at bedtime  glucagon  Injectable 1 milliGRAM(s) IntraMuscular once  insulin glargine Injectable (LANTUS) 12 Unit(s) SubCutaneous at bedtime  insulin lispro (ADMELOG) corrective regimen sliding scale   SubCutaneous three times a day before meals  insulin lispro (ADMELOG) corrective regimen sliding scale   SubCutaneous at bedtime  insulin lispro Injectable (ADMELOG) 7 Unit(s) SubCutaneous three times a day before meals  melatonin 6 milliGRAM(s) Oral at bedtime  metoprolol tartrate 25 milliGRAM(s) Oral two times a day  pantoprazole    Tablet 40 milliGRAM(s) Oral before breakfast  polyethylene glycol 3350 17 Gram(s) Oral daily  senna 2 Tablet(s) Oral at bedtime  zinc oxide 40% Paste 1 Application(s) Topical two times a day    MEDICATIONS  (PRN):  acetaminophen     Tablet .. 650 milliGRAM(s) Oral every 6 hours PRN Temp greater or equal to 38C (100.4F), Mild Pain (1 - 3)  bisacodyl Oral Tab/Cap - Peds 5 milliGRAM(s) Oral daily PRN Constipation  dextrose Oral Gel 15 Gram(s) Oral once PRN Blood Glucose LESS THAN 70 milliGRAM(s)/deciliter      Allergies    adhesives (Pruritus)  No Known Drug Allergies    Intolerances          VITALS  82y  Vital Signs Last 24 Hrs  T(C): 36.8 (24 Aug 2022 08:39), Max: 36.8 (24 Aug 2022 08:39)  T(F): 98.2 (24 Aug 2022 08:39), Max: 98.2 (24 Aug 2022 08:39)  HR: 78 (24 Aug 2022 08:39) (77 - 97)  BP: 99/60 (24 Aug 2022 08:39) (99/60 - 111/68)  BP(mean): --  RR: 16 (24 Aug 2022 08:39) (16 - 16)  SpO2: 100% (24 Aug 2022 08:39) (98% - 100%)    Parameters below as of 24 Aug 2022 08:39  Patient On (Oxygen Delivery Method): room air      Daily     Daily Weight in k.5 (24 Aug 2022 06:17)        RECENT LABS:                      CAPILLARY BLOOD GLUCOSE      POCT Blood Glucose.: 131 mg/dL (24 Aug 2022 08:31)  POCT Blood Glucose.: 146 mg/dL (23 Aug 2022 21:48)  POCT Blood Glucose.: 109 mg/dL (23 Aug 2022 17:11)  POCT Blood Glucose.: 77 mg/dL (23 Aug 2022 12:32)

## 2022-08-24 NOTE — PROGRESS NOTE ADULT - SUBJECTIVE AND OBJECTIVE BOX
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 No acute events overnight.  Pt denies cp, palpitations, sob, abd pain, N/V, fever, chills.    ROS:  All other review of systems negative    Allergies    adhesives (Pruritus)  No Known Drug Allergies    Intolerances        MEDICATIONS  (STANDING):  artificial  tears Solution 1 Drop(s) Both EYES three times a day  atorvastatin 40 milliGRAM(s) Oral at bedtime  brivaracetam 50 milliGRAM(s) Oral every 12 hours  dextrose 5%. 1000 milliLiter(s) (100 mL/Hr) IV Continuous <Continuous>  dextrose 5%. 1000 milliLiter(s) (50 mL/Hr) IV Continuous <Continuous>  dextrose 50% Injectable 25 Gram(s) IV Push once  dextrose 50% Injectable 12.5 Gram(s) IV Push once  dextrose 50% Injectable 25 Gram(s) IV Push once  diVALproex  milliGRAM(s) Oral every 8 hours  enoxaparin Injectable 40 milliGRAM(s) SubCutaneous <User Schedule>  gabapentin 200 milliGRAM(s) Oral at bedtime  glucagon  Injectable 1 milliGRAM(s) IntraMuscular once  insulin glargine Injectable (LANTUS) 12 Unit(s) SubCutaneous at bedtime  insulin lispro (ADMELOG) corrective regimen sliding scale   SubCutaneous three times a day before meals  insulin lispro (ADMELOG) corrective regimen sliding scale   SubCutaneous at bedtime  insulin lispro Injectable (ADMELOG) 7 Unit(s) SubCutaneous three times a day before meals  melatonin 6 milliGRAM(s) Oral at bedtime  metoprolol tartrate 25 milliGRAM(s) Oral two times a day  pantoprazole    Tablet 40 milliGRAM(s) Oral before breakfast  polyethylene glycol 3350 17 Gram(s) Oral daily  senna 2 Tablet(s) Oral at bedtime  zinc oxide 40% Paste 1 Application(s) Topical two times a day    MEDICATIONS  (PRN):  acetaminophen     Tablet .. 650 milliGRAM(s) Oral every 6 hours PRN Temp greater or equal to 38C (100.4F), Mild Pain (1 - 3)  bisacodyl Oral Tab/Cap - Peds 5 milliGRAM(s) Oral daily PRN Constipation  dextrose Oral Gel 15 Gram(s) Oral once PRN Blood Glucose LESS THAN 70 milliGRAM(s)/deciliter      Vital Signs Last 24 Hrs  T(C): 36.8 (24 Aug 2022 08:39), Max: 36.8 (24 Aug 2022 08:39)  T(F): 98.2 (24 Aug 2022 08:39), Max: 98.2 (24 Aug 2022 08:39)  HR: 78 (24 Aug 2022 08:39) (77 - 97)  BP: 99/60 (24 Aug 2022 08:39) (99/60 - 111/68)  BP(mean): --  RR: 16 (24 Aug 2022 08:39) (16 - 16)  SpO2: 100% (24 Aug 2022 08:39) (98% - 100%)    Parameters below as of 24 Aug 2022 08:39  Patient On (Oxygen Delivery Method): room air      CAPILLARY BLOOD GLUCOSE      POCT Blood Glucose.: 131 mg/dL (24 Aug 2022 08:31)  POCT Blood Glucose.: 146 mg/dL (23 Aug 2022 21:48)  POCT Blood Glucose.: 109 mg/dL (23 Aug 2022 17:11)  POCT Blood Glucose.: 77 mg/dL (23 Aug 2022 12:32)    I&O's Summary      PHYSICAL EXAM:  GENERAL: NAD, well-developed female  HEAD:  Surgical incision c/d/i  NECK: Supple, No JVD  CHEST/LUNG: Clear to auscultation bilaterally; No wheeze, nonlabored breathing  HEART: Regular rate and rhythm; No murmurs, rubs, or gallops  ABDOMEN: Soft, Nontender, Nondistended; Bowel sounds present  EXTREMITIES:  2+ Peripheral Pulses, No clubbing, cyanosis, or edema  PSYCH: calm, appropriate mood    LABS:                    RADIOLOGY & ADDITIONAL TESTS:  Results Reviewed:   Imaging Personally Reviewed:  Electrocardiogram Personally Reviewed:    COORDINATION OF CARE:  Care Discussed with Consultants/Other Providers [Y/N]:  Prior or Outpatient Records Reviewed [Y/N]:

## 2022-08-24 NOTE — CHART NOTE - NSCHARTNOTEFT_GEN_A_CORE
Nutrition Follow Up Note  Source: Medical Record [X] Patient [X] Family [ ]         Diet: Regular, consistent carbohydrate with evening snack, Glucerna BID  Pt tolerating diet with fair-good PO intake, eating % of meals. Discussed consistent carb diet + food preferences; pt noted w/ low blood sugar before lunch. Obtained food preferences for breakfast and other meals to optimize PO intake. Denies nausea, vomiting, diarrhea, constipation. Pt denies chewing/swallowing difficulties.     Enteral/Parenteral Nutrition: N/A    Current Weight: 139.9 lbs (8/24)  140.8 lbs (8/22)  140.2 lbs (8/20)  140.2 lbs (8/19)  139.9 lbs (8/17)  140.6 lbs (8/16)  137.7 (8/15)  136.6 lbs (8/14)      Pertinent Medications: MEDICATIONS  (STANDING):  artificial  tears Solution 1 Drop(s) Both EYES three times a day  atorvastatin 40 milliGRAM(s) Oral at bedtime  brivaracetam 50 milliGRAM(s) Oral every 12 hours  dextrose 5%. 1000 milliLiter(s) (100 mL/Hr) IV Continuous <Continuous>  dextrose 5%. 1000 milliLiter(s) (50 mL/Hr) IV Continuous <Continuous>  dextrose 50% Injectable 25 Gram(s) IV Push once  dextrose 50% Injectable 12.5 Gram(s) IV Push once  dextrose 50% Injectable 25 Gram(s) IV Push once  diVALproex  milliGRAM(s) Oral every 8 hours  enoxaparin Injectable 40 milliGRAM(s) SubCutaneous <User Schedule>  gabapentin 200 milliGRAM(s) Oral at bedtime  glucagon  Injectable 1 milliGRAM(s) IntraMuscular once  insulin glargine Injectable (LANTUS) 12 Unit(s) SubCutaneous at bedtime  insulin lispro (ADMELOG) corrective regimen sliding scale   SubCutaneous three times a day before meals  insulin lispro (ADMELOG) corrective regimen sliding scale   SubCutaneous at bedtime  insulin lispro Injectable (ADMELOG) 7 Unit(s) SubCutaneous three times a day before meals  melatonin 6 milliGRAM(s) Oral at bedtime  metoprolol tartrate 25 milliGRAM(s) Oral two times a day  pantoprazole    Tablet 40 milliGRAM(s) Oral before breakfast  polyethylene glycol 3350 17 Gram(s) Oral daily  senna 2 Tablet(s) Oral at bedtime  zinc oxide 40% Paste 1 Application(s) Topical two times a day    MEDICATIONS  (PRN):  acetaminophen     Tablet .. 650 milliGRAM(s) Oral every 6 hours PRN Temp greater or equal to 38C (100.4F), Mild Pain (1 - 3)  bisacodyl Oral Tab/Cap - Peds 5 milliGRAM(s) Oral daily PRN Constipation  dextrose Oral Gel 15 Gram(s) Oral once PRN Blood Glucose LESS THAN 70 milliGRAM(s)/deciliter      Pertinent Labs:  08-22 Na145 mmol/L Glu 152 mg/dL<H> K+ 4.0 mmol/L Cr  0.91 mg/dL BUN 32 mg/dL<H> 08-01 Chol 136 mg/dL LDL --    HDL 59 mg/dL Trig 73 mg/dL  POCT glucose x 4: 103 (H), 62 (L), 61 (L), 131 (H)    Skin: stage I pressure injury R buttocks, stage I pressure injury L heel, surgical incision per nursing flow sheets     Edema: No edema per nursing flow sheets     Last BM: on 8/22 Per nursing flowsheets     Estimated Needs:   [X] No Change since Previous Assessment  [ ] Recalculated:     Previous Nutrition Diagnosis:   1. Severe malnutrition, acute  2. Altered nutrition related lab values    Nutrition Diagnosis is [X] Ongoing  - addressed with Glucerna BID, therapeutic diet, ongoing nutrition education    New Nutrition Diagnosis: [X] Not Applicable  [ ] Inadequate Protein Energy Intake   [ ] Inadequate Oral Intake   [ ] Excessive Energy Intake   [ ] Increased Nutrient Needs   [ ] Obesity   [ ] Altered GI Function   [ ] Unintended Weight Loss   [ ] Food & Nutrition Related Knowledge Deficit  [ ] Limited Adherence to nutrition related recommendations   [ ] Malnutrition      Interventions:   1. Recommend continuing with current plan of care    Monitoring & Evaluation:   [X] Weights   [X] PO Intake   [X] Follow Up (Per Protocol)  [X] Tolerance to Diet Prescription   [X] Other: Labs     RD Remains Available.  Osiris Schmitz RD
Nutrition Follow Up Note  Source: Medical Record [X] Patient [X] Family [ ]         Diet: Regular, consistent carbohydrate with snack, Glucerna BID  Pt tolerating diet with fair-good PO intake, eating % of meals per nursing flow sheets. Pt reports good appetite/PO intake, but noted w/ low blood sugar this AM. Pt encouraged to eat PM snack- requests yogurt as a snack. Pt reports that she was having constipation but had a BM yesterday. Pt denies chewing/swallowing difficulties.     Enteral/Parenteral Nutrition: N/A    Current Weight: 139.9 lbs (8/17)  140.6 lbs (8/16)  137.7 (8/15)  136.6 lbs (8/14)      Pertinent Medications: MEDICATIONS  (STANDING):  artificial  tears Solution 1 Drop(s) Both EYES three times a day  atorvastatin 40 milliGRAM(s) Oral at bedtime  brivaracetam 50 milliGRAM(s) Oral every 12 hours  dextrose 5%. 1000 milliLiter(s) (100 mL/Hr) IV Continuous <Continuous>  dextrose 5%. 1000 milliLiter(s) (50 mL/Hr) IV Continuous <Continuous>  dextrose 50% Injectable 25 Gram(s) IV Push once  dextrose 50% Injectable 12.5 Gram(s) IV Push once  dextrose 50% Injectable 25 Gram(s) IV Push once  diVALproex  milliGRAM(s) Oral every 8 hours  enoxaparin Injectable 40 milliGRAM(s) SubCutaneous <User Schedule>  gabapentin 200 milliGRAM(s) Oral at bedtime  glucagon  Injectable 1 milliGRAM(s) IntraMuscular once  insulin glargine Injectable (LANTUS) 18 Unit(s) SubCutaneous at bedtime  insulin lispro (ADMELOG) corrective regimen sliding scale   SubCutaneous three times a day before meals  insulin lispro (ADMELOG) corrective regimen sliding scale   SubCutaneous at bedtime  insulin lispro Injectable (ADMELOG) 7 Unit(s) SubCutaneous three times a day before meals  melatonin 6 milliGRAM(s) Oral at bedtime  metoprolol tartrate 25 milliGRAM(s) Oral two times a day  pantoprazole    Tablet 40 milliGRAM(s) Oral before breakfast  polyethylene glycol 3350 17 Gram(s) Oral daily  senna 2 Tablet(s) Oral at bedtime  sodium chloride 0.9%. 1000 milliLiter(s) (60 mL/Hr) IV Continuous <Continuous>  zinc oxide 40% Paste 1 Application(s) Topical two times a day    MEDICATIONS  (PRN):  acetaminophen     Tablet .. 650 milliGRAM(s) Oral every 6 hours PRN Temp greater or equal to 38C (100.4F), Mild Pain (1 - 3)  bisacodyl Oral Tab/Cap - Peds 5 milliGRAM(s) Oral daily PRN Constipation  dextrose Oral Gel 15 Gram(s) Oral once PRN Blood Glucose LESS THAN 70 milliGRAM(s)/deciliter      Pertinent Labs:  08-16 Na144 mmol/L Glu 71 mg/dL K+ 3.8 mmol/L Cr  1.04 mg/dL BUN 57 mg/dL<H> 08-11 Alb 2.3 g/dL<L> 08-01 Chol 136 mg/dL LDL --    HDL 59 mg/dL Trig 73 mg/dL        Skin: stage I pressure injury R buttocks, stage I pressure injury L heel, surgical incision per nursing flow sheets     Edema: No edema per nursing flow sheets     Last BM: on 8/16 Per nursing flowsheets     Estimated Needs:   [X] No Change since Previous Assessment  [ ] Recalculated:     Previous Nutrition Diagnosis:   1. Severe malnutrition, acute  2. Altered nutrition related lab values      Nutrition Diagnosis is [X] Ongoing  - addressed with Glucerna BID, on therapeutic diet       New Nutrition Diagnosis: [X] Not Applicable  [ ] Inadequate Protein Energy Intake   [ ] Inadequate Oral Intake   [ ] Excessive Energy Intake   [ ] Increased Nutrient Needs   [ ] Obesity   [ ] Altered GI Function   [ ] Unintended Weight Loss   [ ] Food & Nutrition Related Knowledge Deficit  [ ] Limited Adherence to nutrition related recommendations   [ ] Malnutrition      Interventions:   1. Recommend continuing with current plan of care  2. Encourage PO intake  3. Ongoing nutrition education    Monitoring & Evaluation:   [X] Weights   [X] PO Intake   [X] Follow Up (Per Protocol)  [X] Tolerance to Diet Prescription   [X] Other: Labs     RD Remains Available.  Osiris Schmitz RD
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        COMORBIDITIES/COMPLICATING CONDITIONS IMPACTING REHABILITATION:  HEALTH ISSUES - PROBLEM Dx:  adenocarcinoma of the lung  leukocytosis  renal insufficiency  anemia  s/p craniotomy  aphasia  cognitive impairment   new onset Afib  long term anticoagulation  stage 1 pressure sore buttock      PAST MEDICAL & SURGICAL HISTORY:  Hypertension      Coronary artery disease      Hyperlipidemia      Peripheral artery disease      Myocardial infarct      Peripheral neuropathy      Diabetes      Chronic back pain      Former smoker      PAD (peripheral artery disease)      CAD (coronary artery disease)  stents          Based upon consideration of the patient's impairments, functional status, complicating conditions and any other contributing factors and after information garnered from the assessments of all therapy disciplines involved in treating the patient and other pertinent clinicians:    INTERDISCIPLINARY REHABILITATION INTERVENTIONS:    [ x  ] Transfer Training  [ x  ] Bed Mobility  [ x  ] Therapeutic Exercise  [ x  ] Balance/Coordination Exercises  [  x ] Locomotion training  [ x  ] Stairs    [  x ] Functional transfers  [  x ] Activities of daily living  [ x  ] Visual Perceptual training    [ x  ] Bowel/Bladder program  [ x  ] Pain Management  [ x  ] Skin/Wound Care    [   ] Speech/Communication Exercise  [   ] Swallowing Exercises    [ x  ] Cognitive Exercises  [x   ] Cognitive-linguistic Treatment  [ x  ] Behavioral Program    [ x  ] Patient/Family Counseling  [ x  ] Family Training  [   ] Community Re-entry  [   ] Orthotic Evaluation/Training  [   ] Prosthetic Eval/Training    MEDICAL PROGNOSIS:  fair    REHAB POTENTIAL:  fair+  EXPECTED DAILY THERAPIES:    [ x  ] PT  [ x  ] OT  [ x  ] ST    EXPECTED INTENSITY OF PROGRAM:  3 hours daily    EXPECTED FREQUENCY OF PROGRAM:  5 x week    ESTIMATED LOS:  17 days    ESTIMATED DISPOSITION:  home with supervision daytime hours and home Pt, OT, SLP    INTERDISCIPLINARY FUNCTIONAL OUTCOMES/GOALS:         Gait/Mobility: supervision       Transfers: supervision       ADLs: supervision       Functional Transfers: CG/min assist       Medication Management: supervision       Communication: supervision       Cognitive: supervision iADLs       Nutrition: regular solid thin liquids       Bladder: continent       Bowel: continent

## 2022-08-24 NOTE — PROVIDER CONTACT NOTE (HYPOGLYCEMIA EVENT) - NS PROVIDER CONTACT BACKGROUND-HYPO
Age: 82y    Gender: Female    POCT Blood Glucose:  180 mg/dL (08-24-22 @ 17:14)  103 mg/dL (08-24-22 @ 12:24)  62 mg/dL (08-24-22 @ 12:06)  61 mg/dL (08-24-22 @ 12:03)  131 mg/dL (08-24-22 @ 08:31)  146 mg/dL (08-23-22 @ 21:48)      eMAR:atorvastatin   40 milliGRAM(s) Oral (08-23-22 @ 22:00)    insulin glargine Injectable (LANTUS)   12 Unit(s) SubCutaneous (08-23-22 @ 22:00)    insulin lispro (ADMELOG) corrective regimen sliding scale   2 Unit(s) SubCutaneous (08-24-22 @ 17:19)    insulin lispro Injectable (ADMELOG)   7 Unit(s) SubCutaneous (08-24-22 @ 17:20)   7 Unit(s) SubCutaneous (08-24-22 @ 09:16)

## 2022-08-24 NOTE — PROGRESS NOTE ADULT - CONSTITUTIONAL COMMENTS
alert O x 2-3 craniotomy incision healing well intact, no swelling
alert, mildly anxious but improved overall arousal. O x 2-3. right incision healing well
alert O x 3 grossly, mood stable. Cranial incisions healing well, no redness, intact, no swelling

## 2022-08-25 ENCOUNTER — TRANSCRIPTION ENCOUNTER (OUTPATIENT)
Age: 82
End: 2022-08-25

## 2022-08-25 LAB
ANION GAP SERPL CALC-SCNC: 11 MMOL/L — SIGNIFICANT CHANGE UP (ref 5–17)
BUN SERPL-MCNC: 36 MG/DL — HIGH (ref 7–23)
CALCIUM SERPL-MCNC: 8 MG/DL — LOW (ref 8.4–10.5)
CHLORIDE SERPL-SCNC: 108 MMOL/L — SIGNIFICANT CHANGE UP (ref 96–108)
CO2 SERPL-SCNC: 21 MMOL/L — LOW (ref 22–31)
CREAT SERPL-MCNC: 1.09 MG/DL — SIGNIFICANT CHANGE UP (ref 0.5–1.3)
EGFR: 51 ML/MIN/1.73M2 — LOW
GLUCOSE BLDC GLUCOMTR-MCNC: 127 MG/DL — HIGH (ref 70–99)
GLUCOSE BLDC GLUCOMTR-MCNC: 170 MG/DL — HIGH (ref 70–99)
GLUCOSE BLDC GLUCOMTR-MCNC: 170 MG/DL — HIGH (ref 70–99)
GLUCOSE BLDC GLUCOMTR-MCNC: 183 MG/DL — HIGH (ref 70–99)
GLUCOSE SERPL-MCNC: 163 MG/DL — HIGH (ref 70–99)
HCT VFR BLD CALC: 33.6 % — LOW (ref 34.5–45)
HGB BLD-MCNC: 10.6 G/DL — LOW (ref 11.5–15.5)
MCHC RBC-ENTMCNC: 31.5 GM/DL — LOW (ref 32–36)
MCHC RBC-ENTMCNC: 32.1 PG — SIGNIFICANT CHANGE UP (ref 27–34)
MCV RBC AUTO: 101.8 FL — HIGH (ref 80–100)
NRBC # BLD: 0 /100 WBCS — SIGNIFICANT CHANGE UP (ref 0–0)
PLATELET # BLD AUTO: 141 K/UL — LOW (ref 150–400)
POTASSIUM SERPL-MCNC: 4.5 MMOL/L — SIGNIFICANT CHANGE UP (ref 3.5–5.3)
POTASSIUM SERPL-SCNC: 4.5 MMOL/L — SIGNIFICANT CHANGE UP (ref 3.5–5.3)
RBC # BLD: 3.3 M/UL — LOW (ref 3.8–5.2)
RBC # FLD: 15.4 % — HIGH (ref 10.3–14.5)
SODIUM SERPL-SCNC: 140 MMOL/L — SIGNIFICANT CHANGE UP (ref 135–145)
WBC # BLD: 4.48 K/UL — SIGNIFICANT CHANGE UP (ref 3.8–10.5)
WBC # FLD AUTO: 4.48 K/UL — SIGNIFICANT CHANGE UP (ref 3.8–10.5)

## 2022-08-25 PROCEDURE — 99232 SBSQ HOSP IP/OBS MODERATE 35: CPT

## 2022-08-25 RX ORDER — ENOXAPARIN SODIUM 100 MG/ML
12 INJECTION SUBCUTANEOUS
Qty: 4 | Refills: 0
Start: 2022-08-25 | End: 2022-09-23

## 2022-08-25 RX ORDER — METFORMIN HYDROCHLORIDE 850 MG/1
1 TABLET ORAL
Qty: 60 | Refills: 0
Start: 2022-08-25 | End: 2022-09-23

## 2022-08-25 RX ORDER — METFORMIN HYDROCHLORIDE 850 MG/1
500 TABLET ORAL
Refills: 0 | Status: DISCONTINUED | OUTPATIENT
Start: 2022-08-25 | End: 2022-08-26

## 2022-08-25 RX ADMIN — Medication 6 MILLIGRAM(S): at 21:57

## 2022-08-25 RX ADMIN — Medication 2: at 12:16

## 2022-08-25 RX ADMIN — PANTOPRAZOLE SODIUM 40 MILLIGRAM(S): 20 TABLET, DELAYED RELEASE ORAL at 05:16

## 2022-08-25 RX ADMIN — ZINC OXIDE 1 APPLICATION(S): 200 OINTMENT TOPICAL at 05:19

## 2022-08-25 RX ADMIN — GABAPENTIN 200 MILLIGRAM(S): 400 CAPSULE ORAL at 21:21

## 2022-08-25 RX ADMIN — Medication 1 DROP(S): at 21:22

## 2022-08-25 RX ADMIN — Medication 2: at 08:26

## 2022-08-25 RX ADMIN — ATORVASTATIN CALCIUM 40 MILLIGRAM(S): 80 TABLET, FILM COATED ORAL at 21:22

## 2022-08-25 RX ADMIN — Medication 1 DROP(S): at 13:20

## 2022-08-25 RX ADMIN — BRIVARACETAM 50 MILLIGRAM(S): 25 TABLET, FILM COATED ORAL at 05:18

## 2022-08-25 RX ADMIN — ENOXAPARIN SODIUM 40 MILLIGRAM(S): 100 INJECTION SUBCUTANEOUS at 17:47

## 2022-08-25 RX ADMIN — BRIVARACETAM 50 MILLIGRAM(S): 25 TABLET, FILM COATED ORAL at 17:47

## 2022-08-25 RX ADMIN — DIVALPROEX SODIUM 500 MILLIGRAM(S): 500 TABLET, DELAYED RELEASE ORAL at 21:21

## 2022-08-25 RX ADMIN — Medication 25 MILLIGRAM(S): at 05:17

## 2022-08-25 RX ADMIN — METFORMIN HYDROCHLORIDE 500 MILLIGRAM(S): 850 TABLET ORAL at 17:47

## 2022-08-25 RX ADMIN — SENNA PLUS 2 TABLET(S): 8.6 TABLET ORAL at 21:22

## 2022-08-25 RX ADMIN — POLYETHYLENE GLYCOL 3350 17 GRAM(S): 17 POWDER, FOR SOLUTION ORAL at 11:49

## 2022-08-25 RX ADMIN — DIVALPROEX SODIUM 500 MILLIGRAM(S): 500 TABLET, DELAYED RELEASE ORAL at 13:20

## 2022-08-25 RX ADMIN — DIVALPROEX SODIUM 500 MILLIGRAM(S): 500 TABLET, DELAYED RELEASE ORAL at 05:17

## 2022-08-25 RX ADMIN — Medication 25 MILLIGRAM(S): at 17:47

## 2022-08-25 RX ADMIN — INSULIN GLARGINE 12 UNIT(S): 100 INJECTION, SOLUTION SUBCUTANEOUS at 21:58

## 2022-08-25 RX ADMIN — Medication 2: at 17:19

## 2022-08-25 RX ADMIN — ZINC OXIDE 1 APPLICATION(S): 200 OINTMENT TOPICAL at 17:47

## 2022-08-25 RX ADMIN — Medication 1 DROP(S): at 05:18

## 2022-08-25 NOTE — PROGRESS NOTE ADULT - SUBJECTIVE AND OBJECTIVE BOX
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 No acute events overnight.  Pt denies cp, palpitations, sob, abd pain, N/V, fever, chills.    ROS:  All other review of systems negative    Allergies    adhesives (Pruritus)  No Known Drug Allergies    Intolerances        MEDICATIONS  (STANDING):  artificial  tears Solution 1 Drop(s) Both EYES three times a day  atorvastatin 40 milliGRAM(s) Oral at bedtime  brivaracetam 50 milliGRAM(s) Oral every 12 hours  dextrose 5%. 1000 milliLiter(s) (100 mL/Hr) IV Continuous <Continuous>  dextrose 5%. 1000 milliLiter(s) (50 mL/Hr) IV Continuous <Continuous>  dextrose 50% Injectable 25 Gram(s) IV Push once  dextrose 50% Injectable 12.5 Gram(s) IV Push once  dextrose 50% Injectable 25 Gram(s) IV Push once  diVALproex  milliGRAM(s) Oral every 8 hours  enoxaparin Injectable 40 milliGRAM(s) SubCutaneous <User Schedule>  gabapentin 200 milliGRAM(s) Oral at bedtime  glucagon  Injectable 1 milliGRAM(s) IntraMuscular once  insulin glargine Injectable (LANTUS) 12 Unit(s) SubCutaneous at bedtime  insulin lispro (ADMELOG) corrective regimen sliding scale   SubCutaneous three times a day before meals  insulin lispro (ADMELOG) corrective regimen sliding scale   SubCutaneous at bedtime  melatonin 6 milliGRAM(s) Oral at bedtime  metFORMIN 500 milliGRAM(s) Oral two times a day  metoprolol tartrate 25 milliGRAM(s) Oral two times a day  pantoprazole    Tablet 40 milliGRAM(s) Oral before breakfast  polyethylene glycol 3350 17 Gram(s) Oral daily  senna 2 Tablet(s) Oral at bedtime  zinc oxide 40% Paste 1 Application(s) Topical two times a day    MEDICATIONS  (PRN):  acetaminophen     Tablet .. 650 milliGRAM(s) Oral every 6 hours PRN Temp greater or equal to 38C (100.4F), Mild Pain (1 - 3)  bisacodyl Oral Tab/Cap - Peds 5 milliGRAM(s) Oral daily PRN Constipation  dextrose Oral Gel 15 Gram(s) Oral once PRN Blood Glucose LESS THAN 70 milliGRAM(s)/deciliter      Vital Signs Last 24 Hrs  T(C): 36.8 (24 Aug 2022 19:26), Max: 36.8 (24 Aug 2022 08:39)  T(F): 98.2 (24 Aug 2022 19:26), Max: 98.2 (24 Aug 2022 08:39)  HR: 88 (25 Aug 2022 05:21) (78 - 88)  BP: 114/69 (25 Aug 2022 05:21) (99/60 - 116/62)  BP(mean): --  RR: 16 (24 Aug 2022 19:26) (16 - 16)  SpO2: 98% (24 Aug 2022 19:26) (98% - 100%)    Parameters below as of 24 Aug 2022 19:26  Patient On (Oxygen Delivery Method): room air      CAPILLARY BLOOD GLUCOSE      POCT Blood Glucose.: 170 mg/dL (25 Aug 2022 08:10)  POCT Blood Glucose.: 130 mg/dL (24 Aug 2022 21:52)  POCT Blood Glucose.: 180 mg/dL (24 Aug 2022 17:14)  POCT Blood Glucose.: 103 mg/dL (24 Aug 2022 12:24)  POCT Blood Glucose.: 62 mg/dL (24 Aug 2022 12:06)  POCT Blood Glucose.: 61 mg/dL (24 Aug 2022 12:03)  POCT Blood Glucose.: 131 mg/dL (24 Aug 2022 08:31)    I&O's Summary      PHYSICAL EXAM:  GENERAL: NAD, well-developed female  HEAD:  Surgical incision c/d/i  NECK: Supple, No JVD  CHEST/LUNG: Clear to auscultation bilaterally; No wheeze, nonlabored breathing  HEART: Regular rate and rhythm; No murmurs, rubs, or gallops  ABDOMEN: Soft, Nontender, Nondistended; Bowel sounds present  EXTREMITIES:  2+ Peripheral Pulses, No clubbing, cyanosis, or edema  PSYCH: calm, appropriate mood    LABS:                    RADIOLOGY & ADDITIONAL TESTS:  Results Reviewed:   Imaging Personally Reviewed:  Electrocardiogram Personally Reviewed:    COORDINATION OF CARE:  Care Discussed with Consultants/Other Providers [Y/N]:  Prior or Outpatient Records Reviewed [Y/N]:

## 2022-08-25 NOTE — DISCHARGE NOTE NURSING/CASE MANAGEMENT/SOCIAL WORK - PATIENT PORTAL LINK FT
You can access the FollowMyHealth Patient Portal offered by [facility] by registering at the following website: [email or web address]. By joining [facility]’s FollowMyHealth portal, you will also be able to view your health information using other applications (apps) compatible with our system.

## 2022-08-25 NOTE — PROGRESS NOTE ADULT - ASSESSMENT
82 year old F PMH HTN, DM, HLD, CAD, MI, PAD, peripheral neuropathy, former smoker presented to [facility] ED on 7/19/22 with brain mass s/p Right craniotomy/resection 7/27/22. 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, primary lung  #ADL impairment  - MR brain 7/20/22 with Right temporal lobe peripherally enhancing, centrally necrotic 3.1 x 2.6 cm mass with surrounding vasogenic edema with METS  - s/p right craniotomy for resection of brain mass 7/27/22  - Bronchial biopsy consistent with poorly differentiated adenocarcinoma 7/20/22  - continue with Brivaracetam 50mg BID and decadron taper  - continue depakote 500mg q8hrs for seizure ppx  - will need outpatient PET/CT scan, radiation therapy and further evaluation with heme/onc    #PERCY  - Resolved  - encourage PO fluid intake    #orthostatic  - Off antihypertensives losartan and amlodipine   - encourage PO intake  - continue to monitor     #CAD  #STEMI  #new onset afib  #HTN  #HLD  - echo 8/8/22 with EF 60-65%, basal inferolateral wall, the basal inferior wall, the basal anterolateral wall, and the mid inferior wall are hypokinetic  - not candidate for PCI given risks > benefit as per cath team due to recent craniotomy   - continue ASA/statin/Lopressor  - continue eliquis 5mg BID  - Off antihypertensives losartan and amlodipine     #DM type 2  - Hypoglycemic episodes yesterday  - HbA1C 8.7  - Start Metformin 500mg BID   - Continue Lantus 12U QHS. Discontinue premeal coverage    #history of PE  - found to have RML segmental PE on 8/3  - continue Eliquis 5mg BID    #anemia  - H/H stable  - anemia of chronic disease due to malignancy as per heme    #DVT ppx  - on Eliquis

## 2022-08-25 NOTE — PROGRESS NOTE ADULT - ASSESSMENT
Patient is a 81 YO female with PMHx HTN, DM, HLD, CAD, MI, PAD, peripheral neuropathy, former smoker, who presented to [facility] ED on 7/19/22 with brain mass s/p Right craniotomy/resection 7/27/22, Pathology +NSC carcinoma. Post-op course significant for STEMI, hyperkalemia, encephalopathy, UTI, new onset a-fib.    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 Recommend increase to full dose of lovenox 1mg/kg BID once surgically feasible. Will need lovenox teaching for dc   - continue Comprehensive Rehab Program: PT/OT/ST, 3hours daily and 5 days weekly  - recreation therapy, psychology supportive services  - Precautions: cardiac, DM, fall, sensory, SZ, AMS, aspiration, AC    # dry eyes/proliferators diabetic retinopathy/macular edema  - artificial tears TID  - stable  - private ophthalmologist Dr. Grady on dc    # HTN/CAD/A-fib  - c/w ASA  - Eliquis 5mg BID  - Metoprolol 25mg BID  - Lipitor 40mg daily  - 99/60 - 111/68 HR 77-97 8/24    # DMII  - a1c 8.7 on 7/20  - ISS and FS  - lantus / admleog  - FS . f/u hospitalist re: dc medication regimen    # leukocytosis: resolved  - likely due to steroids, UTI  - WBC 4.10 8/22  - CBC 8/25    # RI- improved  - encourage fluids  - BUn/Cr 48/1.27 8/18--> 32/0.91 8/22  - voiding well, dc bladder scan  - CMP 8/25      # klebsiella UTI  - s/p cipro 500 mg q12 x 3 days    # Thyroid Nodule  - f/u with Endocrinology on dc    # PE  - lovenox PPX dose until cleared by NSGY for full dose, to f/u as outpatient    # Pain management  - Tylenol PRN  - gabapentin    # GI ppx  - Protonix 40mg  - Senna, miralax PRN    # FEN   - Diet: Consistent Carbohydrate Diabetic     # Skin:  - Skin on admission: stage 1 pressure ulcer on LLE lateral heel and right buttock healed    # DVT ppx  - lovenox    # Case discussed in IDT rounds 8/22:  - mild deficits in cognition, reduced attention, improved participation, ambulates 50-75 feet RW and CG, negotiates 4 steps with bilateral HR and min assist/CG  - goals: supervision bADLs, supervision transfers and ambulation, CG stairs Will need supervision 24 x 7 on dc due to cognitive deficits  - patient will need appointments with radiation oncology for palliative radiation therapy for the CNS metastasis.  Will also need set up with a lung oncologist at [facility] upon discharge to discuss palliative chemotherapy and immunotherapy.    - family meeting took place 8/23 Son Dick:  [phone], patient, and daughter-in-law Suzan  - for dc home 8/26 with 24 hour supervision and home care referral OT, PT, SLP, family pickup around 3 PM  - meds: maintenance through mail order (son to provide list of meds)  - new medications: [facility] [address] [place], [place]  - DME: shower chair, raised toilet seat, transport WC, RW      # LABS  CBC BMP 8/25  lovenox teaching     Patient is a 81 YO female with PMHx HTN, DM, HLD, CAD, MI, PAD, peripheral neuropathy, former smoker, who presented to [facility] ED on 7/19/22 with brain mass s/p Right craniotomy/resection 7/27/22, Pathology +NSC carcinoma. Post-op course significant for STEMI, hyperkalemia, encephalopathy, UTI, new onset a-fib.    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 upon discharge to discuss palliative chemotherapy and immunotherapy  ·	Given recent CNS surgery, dc on 40mg lovenox SQ daily for prophylaxis. Will need NSGY clearance for full dose of lovenox 1mg/kg BID to treat PE once surgically feasible. lovenox teaching in progress  - continue Comprehensive Rehab Program: PT/OT/ST, 3hours daily and 5 days weekly  - recreation therapy, psychology supportive services  - Precautions: cardiac, DM, fall, sensory, SZ, AMS, aspiration, AC    # dry eyes/proliferators diabetic retinopathy/macular edema  - artificial tears TID  - stable  - private ophthalmologist Dr. Grady on [place]    # HTN/CAD/A-fib  - c/w ASA  - Eliquis 5mg BID  - Metoprolol 25mg BID  - Lipitor 40mg daily  - (112/58 - 116/62) HR 84-88 8/25  - PCP /cardiology f/u on [place]    # DMII  - a1c 8.7 on 7/20  - ISS and FS  - Start Metformin 500mg BID . Patient reports she was on metformin at home  - Continue Lantus 12U QHS. Discontinue premeal coverage  - PCP f/u on dc    # leukocytosis: resolved  - likely due to steroids, UTI  - WBC 4.10 8/22--> 4.48 8/25    # RI- improved  - encourage fluids  - BUn/Cr 48/1.27 8/18--> 36/1.09 8/25  - PCP f/u on [place]    # klebsiella UTI  - s/p cipro 500 mg q12 x 3 days    # Thyroid Nodule  - f/u with Endocrinology on dc    # PE  - lovenox PPX dose until cleared by NSGY for full dose, to f/u as outpatient    # Pain management  - Tylenol PRN  - gabapentin    # GI ppx  - Protonix 40mg  - Senna, miralax PRN    # FEN   - Diet: Consistent Carbohydrate Diabetic     # Skin:  - Skin on admission: stage 1 pressure ulcer on LLE lateral heel and right buttock healed    # DVT ppx  - lovenox    # Case discussed in IDT rounds 8/22:  - mild deficits in cognition, reduced attention, improved participation, ambulates 50-75 feet RW and CG, negotiates 4 steps with bilateral HR and min assist/CG  - goals: supervision bADLs, supervision transfers and ambulation, CG stairs Will need supervision 24 x 7 on dc due to cognitive deficits  - patient will need appointments with radiation oncology for palliative radiation therapy for the CNS metastasis.  Will also need set up with a lung oncologist at [facility] upon discharge to discuss palliative chemotherapy and immunotherapy.    - family meeting took place 8/23 Son Dick:  [phone], patient, and daughter-in-law Suzan  - for dc home 8/26 with 24 hour supervision and home care referral OT, PT, SLP, family pickup around 3 PM  - meds: maintenance through mail order (son to provide list of meds)  - new medications: [facility] [address] [place], [place]  - DME: shower chair, raised toilet seat, transport WC, RW  - lovenox teaching

## 2022-08-25 NOTE — PROGRESS NOTE ADULT - SUBJECTIVE AND OBJECTIVE BOX
Patient is a 82y old  Female who presents with a chief complaint of meetastatic adenoCA to brain s/p resection 22 (25 Aug 2022 08:13)      HPI:  Patient is a 83 YO female RH dominant with PMHx HTN, DM, HLD, CAD, MI, PAD, peripheral neuropathy, former smoker, who presented to [facility] ED on 22 with chronic back pain and gait/balance issues x 4 months. She was found to have a brain mass on imaging while undergoing work up by her orthopedist (Dr. Castro).    MR brain 22 +Right temporal lobe peripherally enhancing, centrally necrotic 3.1 x 2.6 cm mass with surrounding vasogenic edema. 8 mm leftward midline shift. Right uncal herniation. Additional left parafalcine 1.3 x 0.9 cm rim-enhancing cystic lesion with mild surrounding vasogenic edema. Tiny 0.3 x 0.3cm rim-enhancing lesion within the left frontal cortex concerning for metastatic disease. CT chest showed an obstructing left upper lobe endobronchial lesion. She had bronchial biopsy, consistent with poorly differentiated adenocarcinoma 22. Patient underwent Right craniotomy for resection of brain mass 22, Pathology +NSC carcinoma (favor adeno). Plans for out-pt PET/CT scan, RT, and consideration for systemic therapy.     Post-op course significant for STEMI 22. Not a candidate for cath secondary to comorbidities and recent surgery. She was started on ASA/ heparin gtt and a betablocker. TTE  +normal LV internal dimensions with discrete upper septal hypertrophy. EF 55%, inferolateral wall is akinetic. She was found to have RML segmental PE 8./3. Hospital course also significant for hyperkalemia, and encephalopathy, likely secondary to UTI/early urosepsis (treated with 5 days of Zosyn). EEG +Right temporal sharp waves, no seizures; started on Briviact 8/3/22. She developed new onset Afib w RVR on 22. with elevated troponin; metoprolol was increased, and heparin anticoagulation switched to Eliquis on 22.    Patient was evaluated by PM&R and therapy for functional deficits, gait/ADL impairments and acute rehabilitation was recommended. Patient was medically optimized for discharge to [facility] IRF 8/10/22. (10 Aug 2022 13:45)      PAST MEDICAL & SURGICAL HISTORY:  Hypertension      Coronary artery disease      Hyperlipidemia      Peripheral artery disease      Myocardial infarct      Peripheral neuropathy      Diabetes      Chronic back pain      Former smoker      PAD (peripheral artery disease)      CAD (coronary artery disease)  stents          MEDICATIONS  (STANDING):  artificial  tears Solution 1 Drop(s) Both EYES three times a day  atorvastatin 40 milliGRAM(s) Oral at bedtime  brivaracetam 50 milliGRAM(s) Oral every 12 hours  dextrose 5%. 1000 milliLiter(s) (100 mL/Hr) IV Continuous <Continuous>  dextrose 5%. 1000 milliLiter(s) (50 mL/Hr) IV Continuous <Continuous>  dextrose 50% Injectable 25 Gram(s) IV Push once  dextrose 50% Injectable 12.5 Gram(s) IV Push once  dextrose 50% Injectable 25 Gram(s) IV Push once  diVALproex  milliGRAM(s) Oral every 8 hours  enoxaparin Injectable 40 milliGRAM(s) SubCutaneous <User Schedule>  gabapentin 200 milliGRAM(s) Oral at bedtime  glucagon  Injectable 1 milliGRAM(s) IntraMuscular once  insulin glargine Injectable (LANTUS) 12 Unit(s) SubCutaneous at bedtime  insulin lispro (ADMELOG) corrective regimen sliding scale   SubCutaneous three times a day before meals  insulin lispro (ADMELOG) corrective regimen sliding scale   SubCutaneous at bedtime  melatonin 6 milliGRAM(s) Oral at bedtime  metFORMIN 500 milliGRAM(s) Oral two times a day  metoprolol tartrate 25 milliGRAM(s) Oral two times a day  pantoprazole    Tablet 40 milliGRAM(s) Oral before breakfast  polyethylene glycol 3350 17 Gram(s) Oral daily  senna 2 Tablet(s) Oral at bedtime  zinc oxide 40% Paste 1 Application(s) Topical two times a day    MEDICATIONS  (PRN):  acetaminophen     Tablet .. 650 milliGRAM(s) Oral every 6 hours PRN Temp greater or equal to 38C (100.4F), Mild Pain (1 - 3)  bisacodyl Oral Tab/Cap - Peds 5 milliGRAM(s) Oral daily PRN Constipation  dextrose Oral Gel 15 Gram(s) Oral once PRN Blood Glucose LESS THAN 70 milliGRAM(s)/deciliter      Allergies    adhesives (Pruritus)  No Known Drug Allergies    Intolerances          VITALS  82y  Vital Signs Last 24 Hrs  T(C): 36.8 (24 Aug 2022 19:26), Max: 36.8 (24 Aug 2022 19:26)  T(F): 98.2 (24 Aug 2022 19:26), Max: 98.2 (24 Aug 2022 19:26)  HR: 88 (25 Aug 2022 05:21) (84 - 88)  BP: 114/69 (25 Aug 2022 05:21) (112/58 - 116/62)  BP(mean): --  RR: 16 (24 Aug 2022 19:26) (16 - 16)  SpO2: 98% (24 Aug 2022 19:26) (98% - 98%)    Parameters below as of 24 Aug 2022 19:26  Patient On (Oxygen Delivery Method): room air      Daily     Daily Weight in k.8 (24 Aug 2022 22:44)        RECENT LABS:                          10.6   4.48  )-----------( 141      ( 25 Aug 2022 08:27 )             33.6     08-25    140  |  108  |  36<H>  ----------------------------<  163<H>  4.5   |  21<L>  |  1.09    Ca    8.0<L>      25 Aug 2022 08:27                CAPILLARY BLOOD GLUCOSE      POCT Blood Glucose.: 170 mg/dL (25 Aug 2022 08:10)  POCT Blood Glucose.: 130 mg/dL (24 Aug 2022 21:52)  POCT Blood Glucose.: 180 mg/dL (24 Aug 2022 17:14)  POCT Blood Glucose.: 103 mg/dL (24 Aug 2022 12:24)  POCT Blood Glucose.: 62 mg/dL (24 Aug 2022 12:06)  POCT Blood Glucose.: 61 mg/dL (24 Aug 2022 12:03)          Review of Systems:   · Additional ROS	Patient alert, reports some tenderness at site of right incision     Physical Exam:   · Constitutional Comments	alert O x 3 grossly, mood stable. Cranial incisions healing well, no redness, intact, no swelling  · Respiratory	clear to auscultation bilaterally; no wheezes; no rales; no rhonchi  · Cardiovascular	regular rate and rhythm; S1 S2 present; no gallops; no rub; no murmur  · Gastrointestinal	soft; nontender; nondistended; normal active bowel sounds  · Motor	BUE and LE normal tone and ROM  motor 5/5 BUe and LE  no calf swelling +soft, no TTP no pedal edema         Patient is a 82y old  Female who presents with a chief complaint of meetastatic adenoCA to brain s/p resection 22 (25 Aug 2022 08:13)      HPI:  Patient is a 81 YO female RH dominant with PMHx HTN, DM, HLD, CAD, MI, PAD, peripheral neuropathy, former smoker, who presented to [facility] ED on 22 with chronic back pain and gait/balance issues x 4 months. She was found to have a brain mass on imaging while undergoing work up by her orthopedist (Dr. Castro).    MR brain 22 +Right temporal lobe peripherally enhancing, centrally necrotic 3.1 x 2.6 cm mass with surrounding vasogenic edema. 8 mm leftward midline shift. Right uncal herniation. Additional left parafalcine 1.3 x 0.9 cm rim-enhancing cystic lesion with mild surrounding vasogenic edema. Tiny 0.3 x 0.3cm rim-enhancing lesion within the left frontal cortex concerning for metastatic disease. CT chest showed an obstructing left upper lobe endobronchial lesion. She had bronchial biopsy, consistent with poorly differentiated adenocarcinoma 22. Patient underwent Right craniotomy for resection of brain mass 22, Pathology +NSC carcinoma (favor adeno). Plans for out-pt PET/CT scan, RT, and consideration for systemic therapy.     Post-op course significant for STEMI 22. Not a candidate for cath secondary to comorbidities and recent surgery. She was started on ASA/ heparin gtt and a betablocker. TTE  +normal LV internal dimensions with discrete upper septal hypertrophy. EF 55%, inferolateral wall is akinetic. She was found to have RML segmental PE 8./3. Hospital course also significant for hyperkalemia, and encephalopathy, likely secondary to UTI/early urosepsis (treated with 5 days of Zosyn). EEG +Right temporal sharp waves, no seizures; started on Briviact 8/3/22. She developed new onset Afib w RVR on 22. with elevated troponin; metoprolol was increased, and heparin anticoagulation switched to Eliquis on 22.    Patient was evaluated by PM&R and therapy for functional deficits, gait/ADL impairments and acute rehabilitation was recommended. Patient was medically optimized for discharge to [facility] IRF 8/10/22. (10 Aug 2022 13:45)      PAST MEDICAL & SURGICAL HISTORY:  Hypertension      Coronary artery disease      Hyperlipidemia      Peripheral artery disease      Myocardial infarct      Peripheral neuropathy      Diabetes      Chronic back pain      Former smoker      PAD (peripheral artery disease)      CAD (coronary artery disease)  stents          MEDICATIONS  (STANDING):  artificial  tears Solution 1 Drop(s) Both EYES three times a day  atorvastatin 40 milliGRAM(s) Oral at bedtime  brivaracetam 50 milliGRAM(s) Oral every 12 hours  dextrose 5%. 1000 milliLiter(s) (100 mL/Hr) IV Continuous <Continuous>  dextrose 5%. 1000 milliLiter(s) (50 mL/Hr) IV Continuous <Continuous>  dextrose 50% Injectable 25 Gram(s) IV Push once  dextrose 50% Injectable 12.5 Gram(s) IV Push once  dextrose 50% Injectable 25 Gram(s) IV Push once  diVALproex  milliGRAM(s) Oral every 8 hours  enoxaparin Injectable 40 milliGRAM(s) SubCutaneous <User Schedule>  gabapentin 200 milliGRAM(s) Oral at bedtime  glucagon  Injectable 1 milliGRAM(s) IntraMuscular once  insulin glargine Injectable (LANTUS) 12 Unit(s) SubCutaneous at bedtime  insulin lispro (ADMELOG) corrective regimen sliding scale   SubCutaneous three times a day before meals  insulin lispro (ADMELOG) corrective regimen sliding scale   SubCutaneous at bedtime  melatonin 6 milliGRAM(s) Oral at bedtime  metFORMIN 500 milliGRAM(s) Oral two times a day  metoprolol tartrate 25 milliGRAM(s) Oral two times a day  pantoprazole    Tablet 40 milliGRAM(s) Oral before breakfast  polyethylene glycol 3350 17 Gram(s) Oral daily  senna 2 Tablet(s) Oral at bedtime  zinc oxide 40% Paste 1 Application(s) Topical two times a day    MEDICATIONS  (PRN):  acetaminophen     Tablet .. 650 milliGRAM(s) Oral every 6 hours PRN Temp greater or equal to 38C (100.4F), Mild Pain (1 - 3)  bisacodyl Oral Tab/Cap - Peds 5 milliGRAM(s) Oral daily PRN Constipation  dextrose Oral Gel 15 Gram(s) Oral once PRN Blood Glucose LESS THAN 70 milliGRAM(s)/deciliter      Allergies    adhesives (Pruritus)  No Known Drug Allergies    Intolerances          VITALS  82y  Vital Signs Last 24 Hrs  T(C): 36.8 (24 Aug 2022 19:26), Max: 36.8 (24 Aug 2022 19:26)  T(F): 98.2 (24 Aug 2022 19:26), Max: 98.2 (24 Aug 2022 19:26)  HR: 88 (25 Aug 2022 05:21) (84 - 88)  BP: 114/69 (25 Aug 2022 05:21) (112/58 - 116/62)  BP(mean): --  RR: 16 (24 Aug 2022 19:26) (16 - 16)  SpO2: 98% (24 Aug 2022 19:26) (98% - 98%)    Parameters below as of 24 Aug 2022 19:26  Patient On (Oxygen Delivery Method): room air      Daily     Daily Weight in k.8 (24 Aug 2022 22:44)        RECENT LABS:                          10.6   4.48  )-----------( 141      ( 25 Aug 2022 08:27 )             33.6     08-25    140  |  108  |  36<H>  ----------------------------<  163<H>  4.5   |  21<L>  |  1.09    Ca    8.0<L>      25 Aug 2022 08:27                CAPILLARY BLOOD GLUCOSE      POCT Blood Glucose.: 170 mg/dL (25 Aug 2022 08:10)  POCT Blood Glucose.: 130 mg/dL (24 Aug 2022 21:52)  POCT Blood Glucose.: 180 mg/dL (24 Aug 2022 17:14)  POCT Blood Glucose.: 103 mg/dL (24 Aug 2022 12:24)  POCT Blood Glucose.: 62 mg/dL (24 Aug 2022 12:06)  POCT Blood Glucose.: 61 mg/dL (24 Aug 2022 12:03)          Review of Systems:   · Additional ROS	Patient alert, reports some tenderness at site of right incision had shower and hair was done. Incision healing well, 2/3 without eschar and no erythema, intact. distal portion with residual eschar, no subcuanteous swelling, no erythema no oozing    Was able to perform transfers to shower with cues and CS today    Physical Exam:   · Constitutional Comments	alert O x 3 grossly, for dc tomorrow. mood stable, participating  · Respiratory	clear to auscultation bilaterally; no wheezes; no rales; no rhonchi  · Cardiovascular	regular rate and rhythm; S1 S2 present; no gallops; no rub; no murmur  · Gastrointestinal	soft; nontender; nondistended; normal active bowel sounds  · Motor	BUE and LE normal tone and ROM  motor 5/5 BUe and LE  no calf swelling +soft, no TTP no pedal edema

## 2022-08-26 VITALS
SYSTOLIC BLOOD PRESSURE: 107 MMHG | TEMPERATURE: 98 F | OXYGEN SATURATION: 95 % | HEART RATE: 68 BPM | DIASTOLIC BLOOD PRESSURE: 65 MMHG | RESPIRATION RATE: 16 BRPM

## 2022-08-26 LAB
GLUCOSE BLDC GLUCOMTR-MCNC: 148 MG/DL — HIGH (ref 70–99)
GLUCOSE BLDC GLUCOMTR-MCNC: 240 MG/DL — HIGH (ref 70–99)

## 2022-08-26 PROCEDURE — 99239 HOSP IP/OBS DSCHRG MGMT >30: CPT

## 2022-08-26 PROCEDURE — 99232 SBSQ HOSP IP/OBS MODERATE 35: CPT

## 2022-08-26 RX ORDER — METOPROLOL TARTRATE 50 MG
1 TABLET ORAL
Qty: 60 | Refills: 0
Start: 2022-08-26 | End: 2022-09-24

## 2022-08-26 RX ORDER — BRIVARACETAM 25 MG/1
1 TABLET, FILM COATED ORAL
Qty: 60 | Refills: 0
Start: 2022-08-26 | End: 2022-09-24

## 2022-08-26 RX ORDER — ATORVASTATIN CALCIUM 80 MG/1
1 TABLET, FILM COATED ORAL
Qty: 30 | Refills: 0
Start: 2022-08-26 | End: 2022-09-24

## 2022-08-26 RX ORDER — PANTOPRAZOLE SODIUM 20 MG/1
1 TABLET, DELAYED RELEASE ORAL
Qty: 30 | Refills: 0
Start: 2022-08-26 | End: 2022-09-24

## 2022-08-26 RX ADMIN — METFORMIN HYDROCHLORIDE 500 MILLIGRAM(S): 850 TABLET ORAL at 05:02

## 2022-08-26 RX ADMIN — Medication 1 DROP(S): at 13:29

## 2022-08-26 RX ADMIN — Medication 4: at 12:06

## 2022-08-26 RX ADMIN — BRIVARACETAM 50 MILLIGRAM(S): 25 TABLET, FILM COATED ORAL at 05:03

## 2022-08-26 RX ADMIN — PANTOPRAZOLE SODIUM 40 MILLIGRAM(S): 20 TABLET, DELAYED RELEASE ORAL at 05:02

## 2022-08-26 RX ADMIN — Medication 25 MILLIGRAM(S): at 05:01

## 2022-08-26 RX ADMIN — DIVALPROEX SODIUM 500 MILLIGRAM(S): 500 TABLET, DELAYED RELEASE ORAL at 13:29

## 2022-08-26 RX ADMIN — ZINC OXIDE 1 APPLICATION(S): 200 OINTMENT TOPICAL at 05:04

## 2022-08-26 RX ADMIN — Medication 1 DROP(S): at 05:03

## 2022-08-26 RX ADMIN — DIVALPROEX SODIUM 500 MILLIGRAM(S): 500 TABLET, DELAYED RELEASE ORAL at 05:02

## 2022-08-26 NOTE — PROGRESS NOTE ADULT - ASSESSMENT
Patient is a 81 YO female with PMHx HTN, DM, HLD, CAD, MI, PAD, peripheral neuropathy, former smoker, who presented to [facility] ED on 7/19/22 with brain mass s/p Right craniotomy/resection 7/27/22, Pathology +NSC carcinoma. Post-op course significant for STEMI, hyperkalemia, encephalopathy, UTI, new onset a-fib.    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 Will need NSGY clearance for full dose of lovenox 1mg/kg BID to treat PE once surgically feasible. lovenox teaching in progress  - continue [facility] Program: PT/OT/ST, 3hours daily and 5 days weekly  - recreation therapy, psychology supportive services  - Precautions: cardiac, DM, fall, sensory, SZ, AMS, aspiration, AC    # dry eyes/proliferators diabetic retinopathy/macular edema  - artificial tears TID  - stable  - private ophthalmologist Dr. Grady on MI    # HTN/CAD/A-fib  - c/w ASA  - Eliquis 5mg BID  - Metoprolol 25mg BID  - Lipitor 40mg daily  - (112/58 - 116/62) HR 84-88 8/25  - PCP /cardiology f/u on MI    # DMII  - a1c 8.7 on 7/20  - ISS and FS  - Start Metformin 500mg BID . Patient reports she was on metformin at home  - Continue Lantus 12U QHS. Discontinue premeal coverage  - PCP f/u on dc    # leukocytosis: resolved  - likely due to steroids, UTI  - WBC 4.10 8/22--> 4.48 8/25    # RI- improved  - encourage fluids  - BUn/Cr 48/1.27 8/18--> 36/1.09 8/25  - PCP f/u on MI    # klebsiella UTI  - s/p cipro 500 mg q12 x 3 days    # Thyroid Nodule  - f/u with Endocrinology on dc    # PE  - lovenox PPX dose until cleared by NSGY for full dose, to f/u as outpatient    # Pain management  - Tylenol PRN  - gabapentin    # GI ppx  - Protonix 40mg  - Senna, miralax PRN    # FEN   - Diet: Consistent Carbohydrate Diabetic     # Skin:  - Skin on admission: stage 1 pressure ulcer on LLE lateral heel and right buttock healed    # DVT ppx  - lovenox    # Case discussed in IDT rounds 8/22:  - mild deficits in cognition, reduced attention, improved participation, ambulates 50-75 feet RW and CG, negotiates 4 steps with bilateral HR and min assist/CG  - goals: supervision bADLs, supervision transfers and ambulation, CG stairs Will need supervision 24 x 7 on dc due to cognitive deficits  - patient will need appointments with radiation oncology for palliative radiation therapy for the CNS metastasis.  Will also need set up with a lung oncologist at [facility] upon discharge to discuss palliative chemotherapy and immunotherapy.    - family meeting took place 8/23 Son Dick:  [phone], patient, and daughter-in-law Suzan  - for dc home 8/26 with 24 hour supervision and home care referral OT, PT, SLP, family pickup around 3 PM  - meds: maintenance through mail order (son to provide list of meds)  - new medications: [facility] [address] [place], [place]  - DME: shower chair, raised toilet seat, transport WC, RW  - lovenox teaching     Patient is a 81 YO female with PMHx HTN, DM, HLD, CAD, MI, PAD, peripheral neuropathy, former smoker, who presented to [facility] ED on 7/19/22 with brain mass s/p Right craniotomy/resection 7/27/22, Pathology +NSC carcinoma. Post-op course significant for STEMI, hyperkalemia, encephalopathy, UTI, new onset a-fib.    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 Will need NSGY clearance for full dose of lovenox 1mg/kg BID to treat PE once surgically feasible  - for home care referral and home PT, OT, SLP on dc  - NSGY f/u on dc    # dry eyes/proliferators diabetic retinopathy/macular edema  - artificial tears TID  - stable  - private ophthalmologist Dr. Grady on dc    # HTN/CAD/A-fib  - c/w ASA  - Eliquis 5mg BID  - Metoprolol 25mg BID  - Lipitor 40mg daily  - PCP /cardiology f/u on dc    # DMII  - a1c 8.7 on 7/20  - ISS and FS  - Metformin 500mg BID  - Lantus 12U QHS  - PCP f/u on dc    # leukocytosis: resolved  - likely due to steroids, UTI  - WBC 4.48 8/25    # RI- improved  - encourage fluids  - BUn/Cr 36/1.09 8/25  - PCP f/u on dc    # klebsiella UTI  - s/p cipro 500 mg q12 x 3 days    # Thyroid Nodule  - f/u with Endocrinology on dc    # PE  - lovenox PPX dose until cleared by NSGY for full dose, to f/u as outpatient    # Pain management  - Tylenol PRN  - gabapentin    # GI ppx  - Protonix 40mg  - Senna, miralax PRN    # FEN   - Diet: Consistent Carbohydrate Diabetic     # Skin:  - Skin on admission: stage 1 pressure ulcer on LLE lateral heel and right buttock healed    # DVT ppx  - lovenox    # Medically cleared for dc home. Caregiver training and family meeting performed, patient and family agreeable with dc plan. they will make appointments to f/u with oncologist and radiation oncologist on dc, as well as NSGY, PCP and PMR/ Home care referral for PT, OT, SLP also made, and family will provide 24 hour supervision through aide on dc. Lovenox teaching performed. to f/u NSGY and oncology for clearance for full dose AC. Medications sent to pharmacy, DME delivered. Time spent for education, evaluation, coordinaton dc 45 min

## 2022-08-26 NOTE — PROGRESS NOTE ADULT - SUBJECTIVE AND OBJECTIVE BOX
Patient is a 82y old  Female who presents with a chief complaint of metastatic adenoCA to brain s/p resection 22 (26 Aug 2022 09:23)      HPI:  Patient is a 81 YO female RH dominant with PMHx HTN, DM, HLD, CAD, MI, PAD, peripheral neuropathy, former smoker, who presented to [facility] ED on 22 with chronic back pain and gait/balance issues x 4 months. She was found to have a brain mass on imaging while undergoing work up by her orthopedist (Dr. Castro).    MR brain 22 +Right temporal lobe peripherally enhancing, centrally necrotic 3.1 x 2.6 cm mass with surrounding vasogenic edema. 8 mm leftward midline shift. Right uncal herniation. Additional left parafalcine 1.3 x 0.9 cm rim-enhancing cystic lesion with mild surrounding vasogenic edema. Tiny 0.3 x 0.3cm rim-enhancing lesion within the left frontal cortex concerning for metastatic disease. CT chest showed an obstructing left upper lobe endobronchial lesion. She had bronchial biopsy, consistent with poorly differentiated adenocarcinoma 22. Patient underwent Right craniotomy for resection of brain mass 22, Pathology +NSC carcinoma (favor adeno). Plans for out-pt PET/CT scan, RT, and consideration for systemic therapy.     Post-op course significant for STEMI 22. Not a candidate for cath secondary to comorbidities and recent surgery. She was started on ASA/ heparin gtt and a betablocker. TTE  +normal LV internal dimensions with discrete upper septal hypertrophy. EF 55%, inferolateral wall is akinetic. She was found to have RML segmental PE 8./3. Hospital course also significant for hyperkalemia, and encephalopathy, likely secondary to UTI/early urosepsis (treated with 5 days of Zosyn). EEG +Right temporal sharp waves, no seizures; started on Briviact 8/3/22. She developed new onset Afib w RVR on 22. with elevated troponin; metoprolol was increased, and heparin anticoagulation switched to Eliquis on 22.    Patient was evaluated by PM&R and therapy for functional deficits, gait/ADL impairments and acute rehabilitation was recommended. Patient was medically optimized for discharge to [facility] IRF 8/10/22. (10 Aug 2022 13:45)      PAST MEDICAL & SURGICAL HISTORY:  Hypertension      Coronary artery disease      Hyperlipidemia      Peripheral artery disease      Myocardial infarct      Peripheral neuropathy      Diabetes      Chronic back pain      Former smoker      PAD (peripheral artery disease)      CAD (coronary artery disease)  stents          MEDICATIONS  (STANDING):  artificial  tears Solution 1 Drop(s) Both EYES three times a day  atorvastatin 40 milliGRAM(s) Oral at bedtime  brivaracetam 50 milliGRAM(s) Oral every 12 hours  dextrose 5%. 1000 milliLiter(s) (100 mL/Hr) IV Continuous <Continuous>  dextrose 5%. 1000 milliLiter(s) (50 mL/Hr) IV Continuous <Continuous>  dextrose 50% Injectable 25 Gram(s) IV Push once  dextrose 50% Injectable 12.5 Gram(s) IV Push once  dextrose 50% Injectable 25 Gram(s) IV Push once  diVALproex  milliGRAM(s) Oral every 8 hours  enoxaparin Injectable 40 milliGRAM(s) SubCutaneous <User Schedule>  gabapentin 200 milliGRAM(s) Oral at bedtime  glucagon  Injectable 1 milliGRAM(s) IntraMuscular once  insulin glargine Injectable (LANTUS) 12 Unit(s) SubCutaneous at bedtime  insulin lispro (ADMELOG) corrective regimen sliding scale   SubCutaneous three times a day before meals  insulin lispro (ADMELOG) corrective regimen sliding scale   SubCutaneous at bedtime  melatonin 6 milliGRAM(s) Oral at bedtime  metFORMIN 500 milliGRAM(s) Oral two times a day  metoprolol tartrate 25 milliGRAM(s) Oral two times a day  pantoprazole    Tablet 40 milliGRAM(s) Oral before breakfast  polyethylene glycol 3350 17 Gram(s) Oral daily  senna 2 Tablet(s) Oral at bedtime  zinc oxide 40% Paste 1 Application(s) Topical two times a day    MEDICATIONS  (PRN):  acetaminophen     Tablet .. 650 milliGRAM(s) Oral every 6 hours PRN Temp greater or equal to 38C (100.4F), Mild Pain (1 - 3)  bisacodyl Oral Tab/Cap - Peds 5 milliGRAM(s) Oral daily PRN Constipation  dextrose Oral Gel 15 Gram(s) Oral once PRN Blood Glucose LESS THAN 70 milliGRAM(s)/deciliter      Allergies    adhesives (Pruritus)  No Known Drug Allergies    Intolerances          VITALS  82y  Vital Signs Last 24 Hrs  T(C): 36.7 (26 Aug 2022 07:30), Max: 36.7 (25 Aug 2022 20:39)  T(F): 98.1 (26 Aug 2022 07:30), Max: 98.1 (25 Aug 2022 20:39)  HR: 68 (26 Aug 2022 07:30) (68 - 86)  BP: 107/65 (26 Aug 2022 07:30) (101/57 - 107/65)  BP(mean): --  RR: 16 (26 Aug 2022 07:30) (16 - 16)  SpO2: 95% (26 Aug 2022 07:30) (95% - 95%)    Parameters below as of 26 Aug 2022 07:30  Patient On (Oxygen Delivery Method): room air      Daily     Daily Weight in k.7 (26 Aug 2022 04:24)        RECENT LABS:                          10.6   4.48  )-----------( 141      ( 25 Aug 2022 08:27 )             33.6     08-25    140  |  108  |  36<H>  ----------------------------<  163<H>  4.5   |  21<L>  |  1.09    Ca    8.0<L>      25 Aug 2022 08:27                CAPILLARY BLOOD GLUCOSE      POCT Blood Glucose.: 240 mg/dL (26 Aug 2022 12:02)  POCT Blood Glucose.: 148 mg/dL (26 Aug 2022 08:00)  POCT Blood Glucose.: 127 mg/dL (25 Aug 2022 21:40)  POCT Blood Glucose.: 183 mg/dL (25 Aug 2022 17:17)          Review of Systems:   · Additional ROS	Patient     Physical Exam:   · Constitutional Comments	alert O x 3 grossly, for dc tomorrow. mood stable, participating  · Respiratory	clear to auscultation bilaterally; no wheezes; no rales; no rhonchi  · Cardiovascular	regular rate and rhythm; S1 S2 present; no gallops; no rub; no murmur  · Gastrointestinal	soft; nontender; nondistended; normal active bowel sounds  · Motor	BUE and LE normal tone and ROM  motor 5/5 BUe and LE  no calf swelling +soft, no TTP no pedal edema           Patient is a 82y old  Female who presents with a chief complaint of metastatic adenoCA to brain s/p resection 22 (26 Aug 2022 09:23)      HPI:  Patient is a 81 YO female RH dominant with PMHx HTN, DM, HLD, CAD, MI, PAD, peripheral neuropathy, former smoker, who presented to [facility] ED on 22 with chronic back pain and gait/balance issues x 4 months. She was found to have a brain mass on imaging while undergoing work up by her orthopedist (Dr. Castro).    MR brain 22 +Right temporal lobe peripherally enhancing, centrally necrotic 3.1 x 2.6 cm mass with surrounding vasogenic edema. 8 mm leftward midline shift. Right uncal herniation. Additional left parafalcine 1.3 x 0.9 cm rim-enhancing cystic lesion with mild surrounding vasogenic edema. Tiny 0.3 x 0.3cm rim-enhancing lesion within the left frontal cortex concerning for metastatic disease. CT chest showed an obstructing left upper lobe endobronchial lesion. She had bronchial biopsy, consistent with poorly differentiated adenocarcinoma 22. Patient underwent Right craniotomy for resection of brain mass 22, Pathology +NSC carcinoma (favor adeno). Plans for out-pt PET/CT scan, RT, and consideration for systemic therapy.     Post-op course significant for STEMI 22. Not a candidate for cath secondary to comorbidities and recent surgery. She was started on ASA/ heparin gtt and a betablocker. TTE  +normal LV internal dimensions with discrete upper septal hypertrophy. EF 55%, inferolateral wall is akinetic. She was found to have RML segmental PE 8./3. Hospital course also significant for hyperkalemia, and encephalopathy, likely secondary to UTI/early urosepsis (treated with 5 days of Zosyn). EEG +Right temporal sharp waves, no seizures; started on Briviact 8/3/22. She developed new onset Afib w RVR on 22. with elevated troponin; metoprolol was increased, and heparin anticoagulation switched to Eliquis on 22.    Patient was evaluated by PM&R and therapy for functional deficits, gait/ADL impairments and acute rehabilitation was recommended. Patient was medically optimized for discharge to [facility] IRF 8/10/22. (10 Aug 2022 13:45)      PAST MEDICAL & SURGICAL HISTORY:  Hypertension      Coronary artery disease      Hyperlipidemia      Peripheral artery disease      Myocardial infarct      Peripheral neuropathy      Diabetes      Chronic back pain      Former smoker      PAD (peripheral artery disease)      CAD (coronary artery disease)  stents          MEDICATIONS  (STANDING):  artificial  tears Solution 1 Drop(s) Both EYES three times a day  atorvastatin 40 milliGRAM(s) Oral at bedtime  brivaracetam 50 milliGRAM(s) Oral every 12 hours  dextrose 5%. 1000 milliLiter(s) (100 mL/Hr) IV Continuous <Continuous>  dextrose 5%. 1000 milliLiter(s) (50 mL/Hr) IV Continuous <Continuous>  dextrose 50% Injectable 25 Gram(s) IV Push once  dextrose 50% Injectable 12.5 Gram(s) IV Push once  dextrose 50% Injectable 25 Gram(s) IV Push once  diVALproex  milliGRAM(s) Oral every 8 hours  enoxaparin Injectable 40 milliGRAM(s) SubCutaneous <User Schedule>  gabapentin 200 milliGRAM(s) Oral at bedtime  glucagon  Injectable 1 milliGRAM(s) IntraMuscular once  insulin glargine Injectable (LANTUS) 12 Unit(s) SubCutaneous at bedtime  insulin lispro (ADMELOG) corrective regimen sliding scale   SubCutaneous three times a day before meals  insulin lispro (ADMELOG) corrective regimen sliding scale   SubCutaneous at bedtime  melatonin 6 milliGRAM(s) Oral at bedtime  metFORMIN 500 milliGRAM(s) Oral two times a day  metoprolol tartrate 25 milliGRAM(s) Oral two times a day  pantoprazole    Tablet 40 milliGRAM(s) Oral before breakfast  polyethylene glycol 3350 17 Gram(s) Oral daily  senna 2 Tablet(s) Oral at bedtime  zinc oxide 40% Paste 1 Application(s) Topical two times a day    MEDICATIONS  (PRN):  acetaminophen     Tablet .. 650 milliGRAM(s) Oral every 6 hours PRN Temp greater or equal to 38C (100.4F), Mild Pain (1 - 3)  bisacodyl Oral Tab/Cap - Peds 5 milliGRAM(s) Oral daily PRN Constipation  dextrose Oral Gel 15 Gram(s) Oral once PRN Blood Glucose LESS THAN 70 milliGRAM(s)/deciliter      Allergies    adhesives (Pruritus)  No Known Drug Allergies    Intolerances          VITALS  82y  Vital Signs Last 24 Hrs  T(C): 36.7 (26 Aug 2022 07:30), Max: 36.7 (25 Aug 2022 20:39)  T(F): 98.1 (26 Aug 2022 07:30), Max: 98.1 (25 Aug 2022 20:39)  HR: 68 (26 Aug 2022 07:30) (68 - 86)  BP: 107/65 (26 Aug 2022 07:30) (101/57 - 107/65)  BP(mean): --  RR: 16 (26 Aug 2022 07:30) (16 - 16)  SpO2: 95% (26 Aug 2022 07:30) (95% - 95%)    Parameters below as of 26 Aug 2022 07:30  Patient On (Oxygen Delivery Method): room air      Daily     Daily Weight in k.7 (26 Aug 2022 04:24)        RECENT LABS:                          10.6   4.48  )-----------( 141      ( 25 Aug 2022 08:27 )             33.6     08-25    140  |  108  |  36<H>  ----------------------------<  163<H>  4.5   |  21<L>  |  1.09    Ca    8.0<L>      25 Aug 2022 08:27                CAPILLARY BLOOD GLUCOSE      POCT Blood Glucose.: 240 mg/dL (26 Aug 2022 12:02)  POCT Blood Glucose.: 148 mg/dL (26 Aug 2022 08:00)  POCT Blood Glucose.: 127 mg/dL (25 Aug 2022 21:40)  POCT Blood Glucose.: 183 mg/dL (25 Aug 2022 17:17)          Review of Systems:   · Additional ROS	Patient aware she is going home today. She will miss people here, says she knows how much she improved, that she had a lot of pain in her left side and weakness and was not able to stand and walk perviuosly and that she is doing more now. She is able to verbalize safety issues, that she needs someone with her, and that she should use WC outdoors. She has understandable anxiety about going home, but also says she needs to start treatment for her cancer    Family has arranged 24 hour assist on dc. Medications sent to pharmacy    Physical Exam:   · Constitutional Comments	alert O x 3 mood stable, improved insight and awareness NAD  incision healing well C/D/I  · Respiratory	clear to auscultation bilaterally; no wheezes; no rales; no rhonchi  · Cardiovascular	regular rate and rhythm; S1 S2 present; no gallops; no rub; no murmur  · Gastrointestinal	soft; nontender; nondistended; normal active bowel sounds  · Motor	  motor 5/5 BUe and LE  no calf swelling +soft, no TTP no pedal edema

## 2022-08-26 NOTE — PROGRESS NOTE ADULT - NUTRITIONAL ASSESSMENT
This patient has been assessed with a concern for Malnutrition and has been determined to have a diagnosis/diagnoses of Severe protein-calorie malnutrition.    This patient is being managed with:   Diet Regular-  Consistent Carbohydrate {Evening Snack}  Supplement Feeding Modality:  Oral  Glucerna Shake Cans or Servings Per Day:  1       Frequency:  Two Times a day  Entered: Aug 12 2022 12:18PM    

## 2022-08-26 NOTE — PROGRESS NOTE ADULT - ASSESSMENT
82 year old F PMH HTN, DM, HLD, CAD, MI, PAD, peripheral neuropathy, former smoker presented to [facility] ED on 7/19/22 with brain mass s/p Right craniotomy/resection 7/27/22. 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, primary lung  #ADL impairment  - MR brain 7/20/22 with Right temporal lobe peripherally enhancing, centrally necrotic 3.1 x 2.6 cm mass with surrounding vasogenic edema with METS  - s/p right craniotomy for resection of brain mass 7/27/22  - Bronchial biopsy consistent with poorly differentiated adenocarcinoma 7/20/22  - continue with Brivaracetam 50mg BID and decadron taper  - continue depakote 500mg q8hrs for seizure ppx  - will need outpatient PET/CT scan, radiation therapy and further evaluation with heme/onc    #PERCY  - Resolved  - encourage PO fluid intake    #orthostatic  - Off antihypertensives losartan and amlodipine   - encourage PO intake  - continue to monitor     #CAD  #STEMI  #new onset afib  #HTN  #HLD  - echo 8/8/22 with EF 60-65%, basal inferolateral wall, the basal inferior wall, the basal anterolateral wall, and the mid inferior wall are hypokinetic  - not candidate for PCI given risks > benefit as per cath team due to recent craniotomy   - continue ASA/statin/Lopressor  - continue eliquis 5mg BID  - Off antihypertensives losartan and amlodipine     #DM type 2  - HbA1C 8.7  - Continue Metformin 500mg BID   - Continue Lantus 12U QHS    #history of PE  - found to have RML segmental PE on 8/3  - continue Eliquis 5mg BID    #anemia  - H/H stable  - anemia of chronic disease due to malignancy as per heme    #DVT ppx  - on Eliquis

## 2022-08-26 NOTE — PROGRESS NOTE ADULT - SUBJECTIVE AND OBJECTIVE BOX
Patient is a 82y old  Female who presents with a chief complaint of metastatic adenoCA to brain s/p resection 7/27/22 (25 Aug 2022 10:33)      SUBJECTIVE / OVERNIGHT EVENTS:  Pt seen and examined at bedside. No acute events overnight.  Pt denies cp, palpitations, sob, abd pain, N/V, fever, chills.    ROS:  All other review of systems negative    Allergies    adhesives (Pruritus)  No Known Drug Allergies    Intolerances        MEDICATIONS  (STANDING):  artificial  tears Solution 1 Drop(s) Both EYES three times a day  atorvastatin 40 milliGRAM(s) Oral at bedtime  brivaracetam 50 milliGRAM(s) Oral every 12 hours  dextrose 5%. 1000 milliLiter(s) (100 mL/Hr) IV Continuous <Continuous>  dextrose 5%. 1000 milliLiter(s) (50 mL/Hr) IV Continuous <Continuous>  dextrose 50% Injectable 25 Gram(s) IV Push once  dextrose 50% Injectable 12.5 Gram(s) IV Push once  dextrose 50% Injectable 25 Gram(s) IV Push once  diVALproex  milliGRAM(s) Oral every 8 hours  enoxaparin Injectable 40 milliGRAM(s) SubCutaneous <User Schedule>  gabapentin 200 milliGRAM(s) Oral at bedtime  glucagon  Injectable 1 milliGRAM(s) IntraMuscular once  insulin glargine Injectable (LANTUS) 12 Unit(s) SubCutaneous at bedtime  insulin lispro (ADMELOG) corrective regimen sliding scale   SubCutaneous three times a day before meals  insulin lispro (ADMELOG) corrective regimen sliding scale   SubCutaneous at bedtime  melatonin 6 milliGRAM(s) Oral at bedtime  metFORMIN 500 milliGRAM(s) Oral two times a day  metoprolol tartrate 25 milliGRAM(s) Oral two times a day  pantoprazole    Tablet 40 milliGRAM(s) Oral before breakfast  polyethylene glycol 3350 17 Gram(s) Oral daily  senna 2 Tablet(s) Oral at bedtime  zinc oxide 40% Paste 1 Application(s) Topical two times a day    MEDICATIONS  (PRN):  acetaminophen     Tablet .. 650 milliGRAM(s) Oral every 6 hours PRN Temp greater or equal to 38C (100.4F), Mild Pain (1 - 3)  bisacodyl Oral Tab/Cap - Peds 5 milliGRAM(s) Oral daily PRN Constipation  dextrose Oral Gel 15 Gram(s) Oral once PRN Blood Glucose LESS THAN 70 milliGRAM(s)/deciliter      Vital Signs Last 24 Hrs  T(C): 36.7 (26 Aug 2022 07:30), Max: 36.7 (25 Aug 2022 20:39)  T(F): 98.1 (26 Aug 2022 07:30), Max: 98.1 (25 Aug 2022 20:39)  HR: 68 (26 Aug 2022 07:30) (68 - 86)  BP: 107/65 (26 Aug 2022 07:30) (101/57 - 107/65)  BP(mean): --  RR: 16 (26 Aug 2022 07:30) (16 - 16)  SpO2: 95% (26 Aug 2022 07:30) (95% - 95%)    Parameters below as of 26 Aug 2022 07:30  Patient On (Oxygen Delivery Method): room air      CAPILLARY BLOOD GLUCOSE      POCT Blood Glucose.: 148 mg/dL (26 Aug 2022 08:00)  POCT Blood Glucose.: 127 mg/dL (25 Aug 2022 21:40)  POCT Blood Glucose.: 183 mg/dL (25 Aug 2022 17:17)  POCT Blood Glucose.: 170 mg/dL (25 Aug 2022 11:46)    I&O's Summary    25 Aug 2022 07:01  -  26 Aug 2022 07:00  --------------------------------------------------------  IN: 480 mL / OUT: 0 mL / NET: 480 mL        PHYSICAL EXAM:  GENERAL: NAD, well-developed female  HEAD:  Surgical incision c/d/i  NECK: Supple, No JVD  CHEST/LUNG: Clear to auscultation bilaterally; No wheeze, nonlabored breathing  HEART: Regular rate and rhythm; No murmurs, rubs, or gallops  ABDOMEN: Soft, Nontender, Nondistended; Bowel sounds present  EXTREMITIES:  2+ Peripheral Pulses, No clubbing, cyanosis, or edema  PSYCH: calm, appropriate mood    LABS:                        10.6   4.48  )-----------( 141      ( 25 Aug 2022 08:27 )             33.6     08-25    140  |  108  |  36<H>  ----------------------------<  163<H>  4.5   |  21<L>  |  1.09    Ca    8.0<L>      25 Aug 2022 08:27                RADIOLOGY & ADDITIONAL TESTS:  Results Reviewed:   Imaging Personally Reviewed:  Electrocardiogram Personally Reviewed:    COORDINATION OF CARE:  Care Discussed with Consultants/Other Providers [Y/N]:  Prior or Outpatient Records Reviewed [Y/N]:

## 2022-08-26 NOTE — PROGRESS NOTE ADULT - PROVIDER SPECIALTY LIST ADULT
Hospitalist
Physiatry
Physiatry
Hospitalist
Physiatry
Rehab Medicine
Hospitalist
Hospitalist
Physiatry
Rehab Medicine
Physiatry

## 2022-08-27 ENCOUNTER — OUTPATIENT (OUTPATIENT)
Dept: OUTPATIENT SERVICES | Facility: HOSPITAL | Age: 82
LOS: 1 days | Discharge: HOME | End: 2022-08-27

## 2022-08-27 DIAGNOSIS — I73.9 PERIPHERAL VASCULAR DISEASE, UNSPECIFIED: Chronic | ICD-10-CM

## 2022-08-27 DIAGNOSIS — I25.10 ATHEROSCLEROTIC HEART DISEASE OF NATIVE CORONARY ARTERY WITHOUT ANGINA PECTORIS: Chronic | ICD-10-CM

## 2022-08-27 DIAGNOSIS — C34.90 MALIGNANT NEOPLASM OF UNSPECIFIED PART OF UNSPECIFIED BRONCHUS OR LUNG: ICD-10-CM

## 2022-08-27 RX ORDER — LEVETIRACETAM 250 MG/1
1 TABLET, FILM COATED ORAL
Qty: 60 | Refills: 0
Start: 2022-08-27 | End: 2022-09-25

## 2022-08-29 ENCOUNTER — NON-APPOINTMENT (OUTPATIENT)
Age: 82
End: 2022-08-29

## 2022-08-31 ENCOUNTER — APPOINTMENT (OUTPATIENT)
Dept: HEMATOLOGY ONCOLOGY | Facility: CLINIC | Age: 82
End: 2022-08-31

## 2022-08-31 VITALS
HEIGHT: 62.99 IN | WEIGHT: 169.76 LBS | OXYGEN SATURATION: 97 % | SYSTOLIC BLOOD PRESSURE: 117 MMHG | HEART RATE: 73 BPM | BODY MASS INDEX: 30.08 KG/M2 | DIASTOLIC BLOOD PRESSURE: 78 MMHG | RESPIRATION RATE: 16 BRPM | TEMPERATURE: 98.1 F

## 2022-08-31 DIAGNOSIS — Z78.9 OTHER SPECIFIED HEALTH STATUS: ICD-10-CM

## 2022-08-31 DIAGNOSIS — I25.2 OLD MYOCARDIAL INFARCTION: ICD-10-CM

## 2022-08-31 DIAGNOSIS — Z63.4 DISAPPEARANCE AND DEATH OF FAMILY MEMBER: ICD-10-CM

## 2022-08-31 DIAGNOSIS — M79.89 OTHER SPECIFIED SOFT TISSUE DISORDERS: ICD-10-CM

## 2022-08-31 DIAGNOSIS — Z87.891 PERSONAL HISTORY OF NICOTINE DEPENDENCE: ICD-10-CM

## 2022-08-31 DIAGNOSIS — Z60.2 PROBLEMS RELATED TO LIVING ALONE: ICD-10-CM

## 2022-08-31 PROCEDURE — G2212 PROLONG OUTPT/OFFICE VIS: CPT

## 2022-08-31 PROCEDURE — 99215 OFFICE O/P EST HI 40 MIN: CPT

## 2022-08-31 RX ORDER — GLUCOSAMINE HCL/CHONDROITIN SU 500-400 MG
3 CAPSULE ORAL
Refills: 0 | Status: ACTIVE | COMMUNITY

## 2022-08-31 RX ORDER — ATORVASTATIN CALCIUM 40 MG/1
40 TABLET, FILM COATED ORAL
Refills: 0 | Status: ACTIVE | COMMUNITY

## 2022-08-31 RX ORDER — ACETAMINOPHEN 500 MG/1
TABLET ORAL
Refills: 0 | Status: ACTIVE | COMMUNITY

## 2022-08-31 SDOH — SOCIAL STABILITY - SOCIAL INSECURITY: DISSAPEARANCE AND DEATH OF FAMILY MEMBER: Z63.4

## 2022-08-31 SDOH — SOCIAL STABILITY - SOCIAL INSECURITY: PROBLEMS RELATED TO LIVING ALONE: Z60.2

## 2022-08-31 NOTE — REASON FOR VISIT
[Initial Consultation] : an initial consultation [Other: _____] : [unfilled] [FreeTextEntry2] : Lung Cancer

## 2022-08-31 NOTE — ASSESSMENT
[Palliative] : Goals of care discussed with patient: Palliative [Palliative/Supportive Care consultation requested] : Palliative/Supportive Care consultation requested [FreeTextEntry1] : NSCLC with adenocarcinoma histology that is stage IV or metastatic.  Discussed that her disease is not curable however systemic therapy can be employed with the goal of prolonging survival and palliating symptoms.  Recommend:\par – PET/CT for initial staging of her disease.  PET CT scan can serve to evaluate the nonspecific findings on her inpatient CT scan including the lung nodules, lymph nodes, adrenal nodule and also for evaluation of the bones.\par – Radiation oncology consultation for outpatient RT to the surgical bed as well as the apparent 2 remaining brain metastases.  Request for appointment sent.\par – Discussed palliative care consultation for global symptom management and given her advanced cancer diagnosis; they were agreeable and request for appointment sent.  Patient is apparently a close family friend of a palliative care  at [facility].\par – Patient has a number of comorbidities and her functional status remains borderline currently.  I do have some concerns about administering systemic therapy in this patient, however, we will plan to pursue initial PET CT scan and postop RT treatment to the brain, which will allow time for her functional status to improve.  She has been discharged home from acute rehab and will be starting home PT.\par – Her tumor tested PD-L1 negative, indicating that single agent immunotherapy would not likely be beneficial.  Her tumor lacks actionable mutations that can be exploited as the mainstay of treatment.  Recommended systemic therapy with a combination of chemotherapy and immunotherapy with: Carboplatin AUC 4 and Pemetrexed 500 mg/m2 and Pembrolizumab 200 mg administered IV every 3 weeks.\par – Risks, benefits and side effects of systemic therapy were discussed with the patient who agreed to proceed and signed the consent form; drug info sheets provided.\par – The appropriate antiemetic and prophylactic medications were prescribed\par – Discussed that systemic therapy would be ongoing, for as long as it benefited the patient or unless she developed intolerable side effects.  Would plan to obtain a restaging scan following 3 cycles of systemic therapy to assess response.\par – PE: Patient is currently on prophylactic anticoagulation with enoxaparin and is reportedly waiting for clearance from neurosurgery for full dose therapeutic anticoagulation.  Would plan to continue anticoagulation indefinitely given her active metastatic malignancy.\par – Continue follow-up and management with her PCP/cardiologist for her other ongoing medical problems and comorbidities.\par – I will defer to radiation oncology with regards to her antiseizure medications.  The patient may benefit from neurology or neuro–oncology consultation for management of these medications, and that referral can be made by them, if appropriate.\par – We will sort out the timing of the start of her systemic therapy once her PET/CT is obtained and her postop brain SRS is scheduled.  Would plan to repeat blood work prior to start of systemic therapy as the date approaches.\par – Practice RN attempted to assist the patient with filling out the MOLST form at daughter-in-law’s request, however the patient did not wish to complete this at this time.  This can be addressed by palliative care during that consultation visit.\par – We will plan for the patient to follow-up following each treatment mid-cycle to assess tolerability, or sooner should problems arise\par All questions answered to their apparent satisfaction

## 2022-08-31 NOTE — PHYSICAL EXAM
[Ambulatory and capable of all self care but unable to carry out any work activities] : Status 2- Ambulatory and capable of all self care but unable to carry out any work activities. Up and about more than 50% of waking hours [Normal] : affect appropriate [de-identified] : Elderly woman in wheelchair  [de-identified] : No icterus  [de-identified] : MMM O/P Clear  [de-identified] : Supple No LAD  [de-identified] : Clear  [de-identified] : S1 S2 [de-identified] : LE 1+ pitting edema  [de-identified] : Soft NT [de-identified] : No spine tenderness

## 2022-08-31 NOTE — HISTORY OF PRESENT ILLNESS
[Disease: _____________________] : Disease: [unfilled] [AJCC Stage: ____] : AJCC Stage: [unfilled] [de-identified] : 82F former smoker with multiple comorbidities was undergoing orthopedic evaluation as an outpatient due to ongoing chronic back pain and gait imbalance.  She ultimately presented to [facility] ED on 7/19/2022.  Brain imaging demonstrated evidence of metastatic disease including a dominant right temporal lobe 3.1 cm enhancing mass with vasogenic edema.  CT scan of her body demonstrated a ANTHONY tumor with subcentimeter pulmonary nodules and a nonspecific adrenal nodule.  Bronchoscopy with biopsy performed on 7/20/2022 demonstrated adenocarcinoma consistent with lung primary; tumor tested PD-L1 negative and was negative for actionable mutations.  She underwent craniotomy resection of the dominant brain mass on 7/27/2022 with pathology revealing the same findings.  Hospital course was complicated by NSTEMI, A. fib with RVR and RML PE.  She was put on medications for seizure prophylaxis.  She was put on anticoagulation and currently remains on prophylactic dosing of anticoagulation with enoxaparin 40 mg subcu daily, pending clearance for therapeutic anticoagulation by neurosurgery.  She was subsequently discharged to acute rehab on 8/10–8/26/2022.\par \par She was titrated off steroids while at acute Rehab earlier this month.  She has been home for a few days and reports having a limited functional status.  She has 24-hour home care.  She complains of lower extremity pedal edema; edema is resolved when she wakes up in the morning.  She reports some throat congestion.  No significant SOB or cough.  Weight stable.  No significant pain.  \par \par All other ROS otherwise as outlined.   [de-identified] : – Left mainstem bronchus biopsy 7/20/2022: Poorly differentiated adenocarcinoma of lung origin.\par PD-L1 negative/0%.\par Foundation One:  No actionable mutations (TP53+, among others); TMB-High.  \par –Right brain tumor resection 7/27/2022: Metastatic non-small cell carcinoma, favor adenocarcinoma, consistent with lung origin.\par PD-L1 negative. [de-identified] : I saw this patient for an initial outpatient consultation yesterday.  It is documented that the patient is awaiting neurosurgical clearance prior to therapeutic anticoagulation dosing; she is currently only on prophylactic anticoagulant dosing of enoxaparin 40 mg subcu daily.  Please advise.

## 2022-08-31 NOTE — RESULTS/DATA
Podiatry pager #: [facility] [phone]/ [phone]    Patient is a 72y old  Male who presents with a chief complaint of Left foot gangrene, rule out osteomyelitis (18 Nov 2019 07:07)       INTERVAL HPI/OVERNIGHT EVENTS:  Patient seen and evaluated at bedside.  Pt is resting comfortable in NAD. Denies N/V/F/C.      Allergies    No Known Allergies    Intolerances        Vital Signs Last 24 Hrs  T(C): 36.6 (18 Nov 2019 05:17), Max: 36.9 (17 Nov 2019 22:24)  T(F): 97.9 (18 Nov 2019 05:17), Max: 98.5 (17 Nov 2019 22:24)  HR: 80 (18 Nov 2019 05:17) (75 - 80)  BP: 122/55 (18 Nov 2019 05:17) (119/68 - 135/80)  BP(mean): --  RR: 18 (18 Nov 2019 05:17) (17 - 18)  SpO2: 99% (18 Nov 2019 05:17) (96% - 99%)    LABS:                        7.9    8.06  )-----------( 255      ( 18 Nov 2019 05:15 )             24.1     11-18    126<L>  |  92<L>  |  12  ----------------------------<  139<H>  3.6   |  23  |  0.64    Ca    8.6      18 Nov 2019 05:15          CAPILLARY BLOOD GLUCOSE      POCT Blood Glucose.: 176 mg/dL (17 Nov 2019 22:37)  POCT Blood Glucose.: 150 mg/dL (17 Nov 2019 17:27)  POCT Blood Glucose.: 220 mg/dL (17 Nov 2019 13:12)  POCT Blood Glucose.: 114 mg/dL (17 Nov 2019 08:51)      Lower Extremity Physical Exam:    Vascular: DP/PT 0/4, B/L, CFT <3 seconds B/L, Temperature gradient cool to cool, B/L.   Neuro: Epicritic sensation absent to level of ankles B/L.  Musculoskeletal/Ortho: left lower extremity flexion contraction of knee w/ increased pressure on plantar heel  Skin: Left foot plantar heel deep tissue injury w/ ecchymosis & no open lesions    Wound #1:  Left foot distal hallux eschar, 2.5x2.0cm, depth to subq, wound bed necrotic, no active drainage, no malodor, resolved erythema, etiology 2/ 2 medial hallucal nail border partial nail avulsion & PVD    Wound #2: Left foot lateral 4th digit ulcer, 1.0x1.0cm, depth to subq, wound bed fibrotic, no active drainage, no malodor, erythema resolved, eitology 2/2 chronic pressure from hypertrophic 4th digit proximal phalanx base abutting against 5th digit proximal phalanx head & PVD    left heel unstagable deep tissue injury     RADIOLOGY & ADDITIONAL TESTS: [FreeTextEntry1] : Images Reviewed/Interpreted:\par \par -CT Brain 7/18/22:  Cystic versus a necrotic masses measure approximately 4.8 x 3.2 x 3.2 cm in the right temporal lobe and 1.4 x 1.3 x 1.6 cm in the medial left frontal lobe.  Moderate to large amount of vasogenic edema in the right frontal, parietal and temporal lobes. Trace edema in the left frontal lobe.  Regional mass effect in the right cerebral hemisphere with partial effacement of the ventricular system and 5 mm subfalcine herniation of the right frontal lobe underneath the cerebral falx.  The findings may represent intracranial metastatic disease versus glioblastoma. Contrast-enhanced brain MRI is recommended for further evaluation.\par \par -CT C/A/P 7/18/22:  Left upper lobe lung mass with partial atelectasis of the left upper lobe, concerning for primary lung neoplasm. Few scattered sub-4 mm pulmonary nodules, indeterminate.  Indeterminant left adrenal nodule for which contrast enhanced MRI is recommended for further evaluation.\par \par -MRI Brain 7/20/22:  Right temporal lobe peripherally enhancing, centrally necrotic 3.1 x 2.6 cm mass demonstrating mild diffusion restriction. There is surrounding vasogenic edema. 8 mm leftward midline shift. Right uncal herniation. Additional left parafalcine 1.3 x 0.9 cm rim-enhancing cystic lesion with mild surrounding vasogenic edema. Tiny 0.3 x 0.3 cm rim-enhancing lesion within the left frontal cortex. Findings are concerning for metastatic disease in the context of patient's known primary lung cancer. The left lateral ventricle is mildly dilated.\par \par -LE Dopplers 7/24/22:  No evidence of deep venous thrombosis in either lower extremity.\par -LE Dopplers 7/28/22:  No evidence of deep venous thrombosis in either lower extremity.\par \par -CT Chest Angio 8/3/22:  Pulmonary embolism in the lateral segmental branch of the right middle lobe. No evidence of right heart strain.  Interval decrease in left upper lobe mass.  Unchanged obstruction of the posterior bronchus, and stable fibroglandular lymph node.\par \par -Abdominal U/S 8/4/22:   Mildly distended gallbladder. No evidence of acute cholecystitis.  Possible mild hydronephrosis.\par \par -LE Dopplers 8/7/22:  No evidence of deep venous thrombosis in either lower extremity.\par \par – Labs from acute rehab stay 8/25/2022: WBC 4.4 hemoglobin 10.6  platelets 141K.  Serum creatinine 1.0\par \par

## 2022-09-01 ENCOUNTER — APPOINTMENT (OUTPATIENT)
Dept: ULTRASOUND IMAGING | Facility: IMAGING CENTER | Age: 82
End: 2022-09-01

## 2022-09-01 ENCOUNTER — APPOINTMENT (OUTPATIENT)
Dept: PULMONOLOGY | Facility: CLINIC | Age: 82
End: 2022-09-01

## 2022-09-01 ENCOUNTER — OUTPATIENT (OUTPATIENT)
Dept: OUTPATIENT SERVICES | Facility: HOSPITAL | Age: 82
LOS: 1 days | End: 2022-09-01
Payer: MEDICARE

## 2022-09-01 VITALS
HEIGHT: 62.99 IN | TEMPERATURE: 97.3 F | WEIGHT: 167 LBS | RESPIRATION RATE: 16 BRPM | OXYGEN SATURATION: 98 % | HEART RATE: 73 BPM | BODY MASS INDEX: 29.59 KG/M2

## 2022-09-01 DIAGNOSIS — I73.9 PERIPHERAL VASCULAR DISEASE, UNSPECIFIED: Chronic | ICD-10-CM

## 2022-09-01 DIAGNOSIS — C34.12 MALIGNANT NEOPLASM OF UPPER LOBE, LEFT BRONCHUS OR LUNG: ICD-10-CM

## 2022-09-01 DIAGNOSIS — I25.10 ATHEROSCLEROTIC HEART DISEASE OF NATIVE CORONARY ARTERY WITHOUT ANGINA PECTORIS: Chronic | ICD-10-CM

## 2022-09-01 PROCEDURE — 93970 EXTREMITY STUDY: CPT

## 2022-09-01 PROCEDURE — 99205 OFFICE O/P NEW HI 60 MIN: CPT

## 2022-09-01 PROCEDURE — 94799 UNLISTED PULMONARY SVC/PX: CPT

## 2022-09-01 PROCEDURE — 93970 EXTREMITY STUDY: CPT | Mod: 26

## 2022-09-02 DIAGNOSIS — J43.9 EMPHYSEMA, UNSPECIFIED: ICD-10-CM

## 2022-09-02 DIAGNOSIS — J98.4 EMPHYSEMA, UNSPECIFIED: ICD-10-CM

## 2022-09-06 ENCOUNTER — APPOINTMENT (OUTPATIENT)
Dept: RADIATION ONCOLOGY | Facility: CLINIC | Age: 82
End: 2022-09-06

## 2022-09-06 ENCOUNTER — OUTPATIENT (OUTPATIENT)
Dept: OUTPATIENT SERVICES | Facility: HOSPITAL | Age: 82
LOS: 1 days | Discharge: HOME | End: 2022-09-06

## 2022-09-06 VITALS
TEMPERATURE: 97.5 F | SYSTOLIC BLOOD PRESSURE: 122 MMHG | RESPIRATION RATE: 18 BRPM | OXYGEN SATURATION: 96 % | HEART RATE: 66 BPM | DIASTOLIC BLOOD PRESSURE: 78 MMHG

## 2022-09-06 DIAGNOSIS — Z87.891 PERSONAL HISTORY OF NICOTINE DEPENDENCE: ICD-10-CM

## 2022-09-06 DIAGNOSIS — Z78.9 OTHER SPECIFIED HEALTH STATUS: ICD-10-CM

## 2022-09-06 DIAGNOSIS — I25.10 ATHEROSCLEROTIC HEART DISEASE OF NATIVE CORONARY ARTERY WITHOUT ANGINA PECTORIS: Chronic | ICD-10-CM

## 2022-09-06 DIAGNOSIS — I73.9 PERIPHERAL VASCULAR DISEASE, UNSPECIFIED: Chronic | ICD-10-CM

## 2022-09-06 PROCEDURE — 99215 OFFICE O/P EST HI 40 MIN: CPT | Mod: 25

## 2022-09-06 PROCEDURE — 77263 THER RADIOLOGY TX PLNG CPLX: CPT

## 2022-09-06 NOTE — VITALS
[Maximal Pain Intensity: 8/10] : 8/10 [Least Pain Intensity: 0/10] : 0/10 [50: Requires considerable assistance and frequent medical care.] : 50: Requires considerable assistance and frequent medical care. [Date: ____________] : Patient's last distress assessment performed on [unfilled]. [5 - Distress Level] : Distress Level: 5

## 2022-09-08 ENCOUNTER — APPOINTMENT (OUTPATIENT)
Dept: HEMATOLOGY ONCOLOGY | Facility: CLINIC | Age: 82
End: 2022-09-08

## 2022-09-08 VITALS
RESPIRATION RATE: 17 BRPM | WEIGHT: 168 LBS | BODY MASS INDEX: 29.77 KG/M2 | HEART RATE: 68 BPM | TEMPERATURE: 97.2 F | SYSTOLIC BLOOD PRESSURE: 95 MMHG | DIASTOLIC BLOOD PRESSURE: 68 MMHG

## 2022-09-08 PROCEDURE — 99205 OFFICE O/P NEW HI 60 MIN: CPT

## 2022-09-08 NOTE — REVIEW OF SYSTEMS
[Negative] : Musculoskeletal [Dizziness] : dizziness [Difficulty Walking] : difficulty walking [Fever] : no fever [Chills] : no chills [Night Sweats] : no night sweats [Eye Pain] : no eye pain [Red Eyes] : eyes not red [Dry Eyes] : no dryness of the eyes [Dysphagia] : no dysphagia [Loss of Hearing] : no loss of hearing [Chest Pain] : no chest pain [Palpitations] : no palpitations [FreeTextEntry4] : hoarse voice since surgery [FreeTextEntry7] : denies nausea [FreeTextEntry9] : denies focal weakness [de-identified] : unsteady since surgery. dizziness intermittently since surgery. forgetfulness, notes stable since prior to RT. Unsteady gait since surgery, stable.

## 2022-09-08 NOTE — HISTORY OF PRESENT ILLNESS
[FreeTextEntry1] : Ms. Ros Phan presents for evaluation of brain mets (notably a right temporal lobe peripherally enhancing, centrally necrotic 3.1 x 2.6 cm mass demonstrating mild diffusion restriction with surrounding vasogenic edema as well as a left parafalcine lesion) s/p craniotomy and resection of R temporal lesion on 7/27/2022, and now being considered for RT. Patient has a lung primary, NSCLC (adenocarcinoma on histology, PD-L1 negative and negative for targetable mutations).\par \par ONCOLOGIC HISTORY\par 82F former smoker, PMH of HTN, HLD, CAD, PAD, MI, DM2, peripheral neuropathy who was undergoing outpatient orthopedic evaluation due to ongoing chronic back pain (20+ years) and gait imbalance. MRI outpatient was reported to be abnormal and told to go to ED for further imaging. She ultimately presented to [facility] ED on 7/19/2022. Brain imaging demonstrated evidence of metastatic disease including a dominant right temporal lobe 3.1 cm enhancing mass with vasogenic edema. There was also a Left parafalcine lesion noted. CT scan of her body demonstrated a ANTHONY tumor with subcentimeter pulmonary nodules and a nonspecific adrenal nodule. Bronchoscopy with biopsy performed on 7/20/2022 demonstrated adenocarcinoma consistent with lung primary; tumor tested PD-L1 negative and was negative for actionable mutations. She underwent craniotomy resection of the dominant brain mass on 7/27/2022 with pathology revealing the same findings. Hospital course was complicated by NSTEMI, A. fib with RVR and RML PE. She was put on medications for seizure prophylaxis. She was put on anticoagulation and currently remains on prophylactic dosing of anticoagulation with enoxaparin 40 mg subcu daily, pending clearance for therapeutic anticoagulation by neurosurgery. She was subsequently discharged to acute rehab on 8/10–8/26/2022.\par \par 9/5/2022: Patient presents for initial consult today. She is off of steroids at this time. Continues on keppra and deptakote at this time. Recently follow up with Dr. Ramon and plans to start patient on carboplatin/pemetrexed/pembro. Patient has a PET scan scheduled for Friday. She notes blurry vision in the L eye, short-term memory issues, gait instability following surgery.

## 2022-09-08 NOTE — PHYSICAL EXAM
[General Appearance - Alert] : alert [General Appearance - In No Acute Distress] : in no acute distress [Sclera] : the sclera and conjunctiva were normal [Normal Oral Mucosa] : normal oral mucosa [Neck Appearance] : the appearance of the neck was normal [] : no respiratory distress [Auscultation Breath Sounds / Voice Sounds] : lungs were clear to auscultation bilaterally [Heart Rate And Rhythm] : heart rate was normal and rhythm regular [Heart Sounds] : normal S1 and S2 [Bowel Sounds] : normal bowel sounds [Abdomen Soft] : soft [Abdomen Tenderness] : non-tender [Skin Color & Pigmentation] : normal skin color and pigmentation [No Focal Deficits] : no focal deficits [FreeTextEntry1] : pitting edema b/l LEs

## 2022-09-08 NOTE — ASSESSMENT
[______] : HCP: [unfilled] [FreeTextEntry1] : 82yoF with:\par \par # Stage IV Lung Adenocarcinoma - Patient has established care with Med Onc and Rad Onc, will be pursuing 1st line systemic treatment and gamma knife. \par \par # Fatigue - multifactorial - related to her malignancy + recent surgery + AEDs.  She will be following up with Neurosurgery tomorrow, she will find out about the duration of AED therapy and whether there is a plan to taper doses at all. \par \par # Adjustment d/o - Empathetic listening and validation of patient's emotions provided. \par \par # Physical debility - c/w home PT.\par \par # Encounter for palliative care- Emotional support provided to patient and son.  Validated patient's frustrations with this major life change, barrage of medical appointments and loss of independence. \par Explained the role of palliative care in enhancing quality of life in the setting of serious illness.\par Health Care Proxy (HCP) form completed in office today after explanation of the role of a HCP.\par \par Follow up in 2-3 weeks, call sooner with questions or issues.

## 2022-09-08 NOTE — HISTORY OF PRESENT ILLNESS
[FreeTextEntry1] : 82yoF with stage IV NSCLC presents for initial palliative care visit, referred by Dr. Ramon. \par PMH significant for NSTEMI, DM2, Afib with RVR and RML PE, chronic low back pain, former smoker. \par \par Patient reports that she was suffering with chronic low back pain and progressive gait instability, was evaluated by orthopedist in 2022. She underwent imaging of the spinerain imaging demonstrated evidence of metastatic disease including a dominant right temporal lobe 3.1 cm enhancing mass with vasogenic edema. CT scan of her body demonstrated a ANTHONY tumor with subcentimeter pulmonary nodules and a nonspecific adrenal nodule. Bronchoscopy with biopsy performed on 2022 demonstrated adenocarcinoma consistent with lung primary; tumor tested PD-L1 negative and was negative for actionable mutations. She underwent craniotomy resection of the dominant brain mass on 2022 with pathology revealing the same findings. Hospital course was complicated by NSTEMI, A. fib with RVR and RML PE. She was put on medications for seizure prophylaxis. She was put on anticoagulation and currently remains on prophylactic dosing of anticoagulation with enoxaparin 40 mg subcu daily, pending clearance for therapeutic anticoagulation by neurosurgery. She was subsequently discharged to acute rehab on 8/10–2022. Has initiated home PT services. \par \[facility] ([place] Pt met with Dr. Funes yesterday for evaluation for RT. \par Patient presents to establish with palliative care early on. \par She reports fatigue and weakness as her major issues lately. \par \par ROS:\par +chronic trouble sleeping - takes melatonin 3mg QHS. Since the surgery has been sleeping a lot more, though.\par +mood has been labile, difficult time coping with the diagnosis\par Denies appetite loss, n/v, constipatoin.\par Remainder of ROS non-contributory\par \par Patient is , lives alone. She has one son who lives nearby (two other sons are ). She has a 24/7 HHA since being home, is requiring assistance with her ADLs. She has a shower chair, grab bars. Has not had any falls since being home. Prior to the surgery she was independent, driving. \par \par PMD/Cardiologist: Dr. Kimani Uribe \par Endocrinologist: Dr. Chaz Richardson\par \par I-STOP Ref#: [id number]

## 2022-09-08 NOTE — PHYSICAL EXAM
[Motor Tone] : muscle strength and tone were normal [Normal] : oriented to person, place and time, the affect was normal, the mood was normal and not anxious [de-identified] : patient reports blurry vision in left eye [de-identified] : B/L pitting edema in LE

## 2022-09-08 NOTE — DISEASE MANAGEMENT
[Clinical] : TNM Stage: c [N/A] : Currently not applicable [FreeTextEntry4] : brain mets [TTNM] : x [NTNM] : x [MTNM] : x

## 2022-09-09 ENCOUNTER — APPOINTMENT (OUTPATIENT)
Dept: NUCLEAR MEDICINE | Facility: IMAGING CENTER | Age: 82
End: 2022-09-09

## 2022-09-09 ENCOUNTER — OUTPATIENT (OUTPATIENT)
Dept: OUTPATIENT SERVICES | Facility: HOSPITAL | Age: 82
LOS: 1 days | End: 2022-09-09
Payer: MEDICARE

## 2022-09-09 ENCOUNTER — APPOINTMENT (OUTPATIENT)
Dept: NEUROSURGERY | Facility: CLINIC | Age: 82
End: 2022-09-09

## 2022-09-09 DIAGNOSIS — C34.12 MALIGNANT NEOPLASM OF UPPER LOBE, LEFT BRONCHUS OR LUNG: ICD-10-CM

## 2022-09-09 DIAGNOSIS — I73.9 PERIPHERAL VASCULAR DISEASE, UNSPECIFIED: Chronic | ICD-10-CM

## 2022-09-09 DIAGNOSIS — I25.10 ATHEROSCLEROTIC HEART DISEASE OF NATIVE CORONARY ARTERY WITHOUT ANGINA PECTORIS: Chronic | ICD-10-CM

## 2022-09-09 PROCEDURE — 78815 PET IMAGE W/CT SKULL-THIGH: CPT | Mod: 26,PI,MH

## 2022-09-09 PROCEDURE — A9552: CPT

## 2022-09-09 PROCEDURE — 78815 PET IMAGE W/CT SKULL-THIGH: CPT

## 2022-09-09 PROCEDURE — 99215 OFFICE O/P EST HI 40 MIN: CPT | Mod: 24,95

## 2022-09-10 LAB
CULTURE RESULTS: SIGNIFICANT CHANGE UP
SPECIMEN SOURCE: SIGNIFICANT CHANGE UP

## 2022-09-11 NOTE — REASON FOR VISIT
[Consultation] : a consultation visit [Referred By: _________] : Patient was referred by T[facility]RA [Family Member] : family member [FreeTextEntry1] : Brain Metastases

## 2022-09-11 NOTE — ASSESSMENT
[FreeTextEntry1] : MRI Brain from 7/29/22 reviewed by Dr. Lala with patient and patient's daughter, demonstrates right temporal resection cavity.\par Gamma Knife Radiosurgery recommended in 3 fractions. Risks, alternatives and benefits to Gamma Knife Radiosurgery discussed. Risks include but are not limited to cerebral edema, radionecrosis, seizures, continued tumor growth. Patient understands and wishes to proceed.\par \par Explained procedure to patient in detail including  new MRI the day of procedure, radiation treatment, and post-procedure care. \par Radiation treatment will take place at [address] [place] [address]. Directions and contact information provided to patient.\par Education packet regarding Gamma Knife Radiosurgery provided.\par Multiple questions answered to patient's satisfaction.\par \par Patient and patient's family verbalize agreement and understanding with plan of care.\par \par I, Dr. Lala, personally performed the evaluation and management (E/M) services for this new patient.  That E/M includes conducting the initial examination, assessing all conditions, and establishing the plan of care.  Today, my ACP, Destiny Son, was here to observe my evaluation and management services for this patient to be followed going forward.\par \par

## 2022-09-12 ENCOUNTER — NON-APPOINTMENT (OUTPATIENT)
Age: 82
End: 2022-09-12

## 2022-09-12 DIAGNOSIS — J44.9 CHRONIC OBSTRUCTIVE PULMONARY DISEASE, UNSPECIFIED: ICD-10-CM

## 2022-09-12 NOTE — CONSULT LETTER
[Consult Letter:] : I had the pleasure of evaluating your patient, [unfilled]. [FreeTextEntry2] : Dr. Kimani Uribe

## 2022-09-12 NOTE — HISTORY OF PRESENT ILLNESS
[Former] : former [TextBox_4] : 82-year-old woman here for initial office consultation, post hospital follow-up.  She is accompanied by her son and her home health aide.\par \par This is an 82-year-old woman with extensive tobacco history who quit 18 years ago.  She was admitted to [facility] from July 19 through August 10 with newly diagnosed metastatic non-small cell lung cancer with brain mets.  She subsequently was at [facility] acute rehab from August 10 through August 26.  She is now back home with 24/7 home health aide.\par \par Patient was initially being followed by her orthopedist this past spring and early summer for chronic back pain as well as an unsteady gait.  She underwent an MRI which showed 2 metastatic brain lesions and was admitted to the hospital.  She underwent craniotomy with resection of the larger lesion.  She underwent a CT chest showed a left upper lobe lesion, bronchoscopy with biopsy which consistent with metastatic non-small cell cancer.  Treated with steroids for the brain mets, which were eventually tapered off.  She was also found to have a pulmonary embolism on CTA, currently only on prophylactic dose Lovenox given the recent neurosurgery.\par \par Patient denies particularly significant pulmonary symptoms.  She has some raspiness to her voice and some phlegm in her throat.  She has significant fatigue and is very deconditioned but denies shortness of breath per se.  She is using a walker at home, and now getting home physical therapy.  Her biggest complaint is fatigue and sleepiness.\par She also has had bilateral lower extremity pitting edema, worse at the end of the day but not completely resolved even after leg elevation.\par \par She is currently taking Depakote and Keppra, Lovenox 40 once a day, she is on gabapentin chronically for diabetic neuropathy, and she is on Lantus.

## 2022-09-12 NOTE — DISCUSSION/SUMMARY
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  She had a PE on CTA in the hospital, and is currently only on prophylactic dose Lovenox due to the recent neurosurgery.  Patient and her son now agreed to lower extremity Dopplers, I have arranged an appointment for them for this afternoon.\par \par From a pulmonary standpoint she is actually quite stable.  She has no shortness of breath.  Her oxygen saturation is normal.  She does have some phlegm in her throat she is having difficulty coughing up.  She is going to get an aerobika device for airway clearance from our pulmonary function lab today, and I am starting her on twice daily nebulized DuoNeb to use with it\par \par smoking hx, empiric diagnosis copd as well as lung ca. nebulizer ordered\par

## 2022-09-12 NOTE — REVIEW OF SYSTEMS
[Diabetes] : diabetes [Negative] : Psychiatric [TextBox_3] : HPI [TextBox_14] : HPI [TextBox_30] : HPI [TextBox_83] : She was treated for UTI in the hospital [TextBox_94] : HPI [TextBox_122] : HPI

## 2022-09-12 NOTE — PHYSICAL EXAM
[No Acute Distress] : no acute distress [Normal Appearance] : normal appearance [Normal Rate/Rhythm] : normal rate/rhythm [Normal S1, S2] : normal s1, s2 [No Resp Distress] : no resp distress [Clear to Auscultation Bilaterally] : clear to auscultation bilaterally [Benign] : benign [No Clubbing] : no clubbing [1+ Pitting] : 1+ pitting [No Focal Deficits] : no focal deficits [Oriented x3] : oriented x3 [TextBox_11] : Status post right [place] [TextBox_140] : Falls asleep easily

## 2022-09-15 ENCOUNTER — APPOINTMENT (OUTPATIENT)
Dept: NEUROSURGERY | Facility: CLINIC | Age: 82
End: 2022-09-15

## 2022-09-15 VITALS
DIASTOLIC BLOOD PRESSURE: 78 MMHG | SYSTOLIC BLOOD PRESSURE: 129 MMHG | HEIGHT: 62 IN | WEIGHT: 168 LBS | OXYGEN SATURATION: 100 % | BODY MASS INDEX: 30.91 KG/M2 | HEART RATE: 71 BPM | TEMPERATURE: 97.9 F

## 2022-09-15 DIAGNOSIS — R51.9 HEADACHE, UNSPECIFIED: ICD-10-CM

## 2022-09-15 DIAGNOSIS — L90.5 SCAR CONDITIONS AND FIBROSIS OF SKIN: ICD-10-CM

## 2022-09-15 DIAGNOSIS — G44.209 TENSION-TYPE HEADACHE, UNSPECIFIED, NOT INTRACTABLE: ICD-10-CM

## 2022-09-15 DIAGNOSIS — R26.2 DIFFICULTY IN WALKING, NOT ELSEWHERE CLASSIFIED: ICD-10-CM

## 2022-09-15 PROCEDURE — 99024 POSTOP FOLLOW-UP VISIT: CPT

## 2022-09-16 PROCEDURE — 92610 EVALUATE SWALLOWING FUNCTION: CPT

## 2022-09-16 PROCEDURE — 82962 GLUCOSE BLOOD TEST: CPT

## 2022-09-16 PROCEDURE — 80048 BASIC METABOLIC PNL TOTAL CA: CPT

## 2022-09-16 PROCEDURE — 92523 SPEECH SOUND LANG COMPREHEN: CPT

## 2022-09-16 PROCEDURE — 97110 THERAPEUTIC EXERCISES: CPT

## 2022-09-16 PROCEDURE — 85027 COMPLETE CBC AUTOMATED: CPT

## 2022-09-16 PROCEDURE — 99366 TEAM CONF W/PAT BY HC PROF: CPT

## 2022-09-16 PROCEDURE — 36415 COLL VENOUS BLD VENIPUNCTURE: CPT

## 2022-09-16 PROCEDURE — 81001 URINALYSIS AUTO W/SCOPE: CPT

## 2022-09-16 PROCEDURE — 92507 TX SP LANG VOICE COMM INDIV: CPT

## 2022-09-16 PROCEDURE — 97535 SELF CARE MNGMENT TRAINING: CPT

## 2022-09-16 PROCEDURE — 87086 URINE CULTURE/COLONY COUNT: CPT

## 2022-09-16 PROCEDURE — 87186 SC STD MICRODIL/AGAR DIL: CPT

## 2022-09-16 PROCEDURE — 97530 THERAPEUTIC ACTIVITIES: CPT

## 2022-09-16 PROCEDURE — 97163 PT EVAL HIGH COMPLEX 45 MIN: CPT

## 2022-09-16 PROCEDURE — 97167 OT EVAL HIGH COMPLEX 60 MIN: CPT

## 2022-09-16 PROCEDURE — 80053 COMPREHEN METABOLIC PANEL: CPT

## 2022-09-16 PROCEDURE — 97116 GAIT TRAINING THERAPY: CPT

## 2022-09-16 PROCEDURE — 85025 COMPLETE CBC W/AUTO DIFF WBC: CPT

## 2022-09-16 PROCEDURE — 93005 ELECTROCARDIOGRAM TRACING: CPT

## 2022-09-16 PROCEDURE — 87077 CULTURE AEROBIC IDENTIFY: CPT

## 2022-09-16 PROCEDURE — 87635 SARS-COV-2 COVID-19 AMP PRB: CPT

## 2022-09-19 ENCOUNTER — APPOINTMENT (OUTPATIENT)
Dept: NEUROSURGERY | Facility: AMBULATORY SURGERY CENTER | Age: 82
End: 2022-09-19

## 2022-09-19 ENCOUNTER — OUTPATIENT (OUTPATIENT)
Dept: OUTPATIENT SERVICES | Facility: HOSPITAL | Age: 82
LOS: 1 days | End: 2022-09-19
Payer: MEDICARE

## 2022-09-19 ENCOUNTER — APPOINTMENT (OUTPATIENT)
Dept: MRI IMAGING | Facility: IMAGING CENTER | Age: 82
End: 2022-09-19

## 2022-09-19 DIAGNOSIS — I73.9 PERIPHERAL VASCULAR DISEASE, UNSPECIFIED: Chronic | ICD-10-CM

## 2022-09-19 DIAGNOSIS — C79.31 SECONDARY MALIGNANT NEOPLASM OF BRAIN: ICD-10-CM

## 2022-09-19 DIAGNOSIS — I25.10 ATHEROSCLEROTIC HEART DISEASE OF NATIVE CORONARY ARTERY WITHOUT ANGINA PECTORIS: Chronic | ICD-10-CM

## 2022-09-19 PROCEDURE — 61798 SRS CRANIAL LESION COMPLEX: CPT | Mod: 78

## 2022-09-19 PROCEDURE — 61797 SRS CRAN LES SIMPLE ADDL: CPT

## 2022-09-19 PROCEDURE — 77334 RADIATION TREATMENT AID(S): CPT | Mod: 26

## 2022-09-19 PROCEDURE — 77290 THER RAD SIMULAJ FIELD CPLX: CPT | Mod: 26

## 2022-09-19 PROCEDURE — 76498 UNLISTED MR PROCEDURE: CPT

## 2022-09-19 PROCEDURE — A9585: CPT

## 2022-09-21 ENCOUNTER — APPOINTMENT (OUTPATIENT)
Dept: NEUROSURGERY | Facility: AMBULATORY SURGERY CENTER | Age: 82
End: 2022-09-21

## 2022-09-22 ENCOUNTER — NON-APPOINTMENT (OUTPATIENT)
Age: 82
End: 2022-09-22

## 2022-09-22 ENCOUNTER — APPOINTMENT (OUTPATIENT)
Dept: NEUROSURGERY | Facility: AMBULATORY SURGERY CENTER | Age: 82
End: 2022-09-22

## 2022-09-22 PROCEDURE — 61796 SRS CRANIAL LESION SIMPLE: CPT | Mod: 58

## 2022-09-22 PROCEDURE — 77295 3-D RADIOTHERAPY PLAN: CPT | Mod: 26

## 2022-09-22 PROCEDURE — 77300 RADIATION THERAPY DOSE PLAN: CPT | Mod: 26

## 2022-09-22 PROCEDURE — 77435 SBRT MANAGEMENT: CPT

## 2022-09-26 NOTE — HISTORY OF PRESENT ILLNESS
[FreeTextEntry1] : She is an 82 year old female with past med hx of HTN, CAD, HLD, MI, diabetes, PAD, former smoker (quit 18 years), peripheral neuropathy, and non small cell carcinoma of the lung. She comes to follow up as she underwent a right frontotemporal craniotomy secondary to right metastatic brain tumor (07/27/2022). \par \par She was being evaluated by Ortho for chronic neck pain, who sent her to [facility] ED due to abnormal imaging. Brain MRI showed metastatic brain tumor. at the right frontotemporal lobe. Craniotomy for tumor resection was performed uneventfully. During hospital stay she presented with a NSTEMI and A fib. She was placed on anticoagulants and discharged with Lovenox (she will finish today), Depakote and Keppra. \par \par Today, she complains of very mild recurrent headaches which sometimes originate in right or left side. Her headaches last few minutes. She denies nausea or vomiting, visual disturbance, tinnitus, seizures, or focal neurological symptoms.\par     \par She also comes to discuss venous ultrasound of bilateral lower extremities which is negative. Radiation therapy (Dr. Funes) will start next Monday, and Chemotherapy (Dr. Ramon) in 2 weeks. She ambulates with walker and has a Home health aide.

## 2022-09-26 NOTE — PHYSICAL EXAM
[General Appearance - Alert] : alert [Clean] : clean [Dry] : dry [Healing Well] : healing well [Well-Healed] : well-healed [Normal Skin] : normal [Oriented To Time, Place, And Person] : oriented to person, place, and time [Impaired Insight] : insight and judgment were intact [Affect] : the affect was normal [Memory Recent] : recent memory was not impaired [Person] : oriented to person [Place] : oriented to place [Time] : oriented to time [Short Term Intact] : short term memory intact [Remote Intact] : remote memory intact [Registration Intact] : recent registration memory intact [Span Intact] : the attention span was normal [Concentration Intact] : normal concentrating ability [Visual Intact] : visual attention was ~T not ~L decreased [Fluency] : fluency intact [Comprehension] : comprehension intact [Reading] : reading intact [Current Events] : adequate knowledge of current events [Past History] : adequate knowledge of personal past history [Vocabulary] : adequate range of vocabulary [Cranial Nerves Oculomotor (III)] : extraocular motion intact [Cranial Nerves Trigeminal (V)] : facial sensation intact symmetrically [Cranial Nerves Facial (VII)] : face symmetrical [Cranial Nerves Vestibulocochlear (VIII)] : hearing was intact bilaterally [Cranial Nerves Glossopharyngeal (IX)] : tongue and palate midline [Cranial Nerves Accessory (XI - Cranial And Spinal)] : head turning and shoulder shrug symmetric [Motor Tone] : muscle tone was normal in all four extremities [Motor Strength] : muscle strength was normal in all four extremities [Involuntary Movements] : no involuntary movements were seen [No Muscle Atrophy] : normal bulk in all four extremities [5] : S1 toe walking 5/5 [Limited Balance] : the patient's balance was impaired [Able to toe walk] : the patient was able to toe walk [Able to heel walk] : the patient was able to heel walk [Intact] : all motor groups within normal limits of strength and tone bilaterally [FreeTextEntry8] : Walks with walker.  [Straight-Leg Raise Test - Left] : straight leg raise of the left leg was negative [Straight-Leg Raise Test - Right] : straight leg raise  of the right leg was negative

## 2022-09-26 NOTE — ASSESSMENT
[FreeTextEntry1] : She is progressing well after surgery. She will start radiation therapy next week and follow up with her oncologist in 2 weeks.. therapy. She walks with a walker but she is very steady. We will give her a cane for ambulation at home. We will continue the keppra but will wean the depakote. She will make a follow up appointment with her cardiologist to follow up since she had a MI post op.\par \par \par \par  \par

## 2022-09-26 NOTE — REASON FOR VISIT
[Other: _____] : [unfilled] [Post Op: _________] : a [unfilled] post op visit [FreeTextEntry1] : s/p right craniotomy

## 2022-09-28 ENCOUNTER — NON-APPOINTMENT (OUTPATIENT)
Age: 82
End: 2022-09-28

## 2022-09-30 ENCOUNTER — APPOINTMENT (OUTPATIENT)
Dept: HEMATOLOGY ONCOLOGY | Facility: CLINIC | Age: 82
End: 2022-09-30

## 2022-09-30 ENCOUNTER — INPATIENT (INPATIENT)
Facility: HOSPITAL | Age: 82
LOS: 4 days | Discharge: HOME | DRG: 280 | End: 2022-10-05
Attending: INTERNAL MEDICINE | Admitting: INTERNAL MEDICINE
Payer: MEDICARE

## 2022-09-30 ENCOUNTER — NON-APPOINTMENT (OUTPATIENT)
Age: 82
End: 2022-09-30

## 2022-09-30 VITALS
OXYGEN SATURATION: 96 % | RESPIRATION RATE: 16 BRPM | BODY MASS INDEX: 30.24 KG/M2 | WEIGHT: 165.35 LBS | TEMPERATURE: 98.2 F | DIASTOLIC BLOOD PRESSURE: 81 MMHG | HEART RATE: 74 BPM | SYSTOLIC BLOOD PRESSURE: 125 MMHG

## 2022-09-30 VITALS
SYSTOLIC BLOOD PRESSURE: 137 MMHG | TEMPERATURE: 99 F | OXYGEN SATURATION: 100 % | RESPIRATION RATE: 20 BRPM | WEIGHT: 162.04 LBS | HEART RATE: 81 BPM | DIASTOLIC BLOOD PRESSURE: 84 MMHG | HEIGHT: 65 IN

## 2022-09-30 DIAGNOSIS — I25.10 ATHEROSCLEROTIC HEART DISEASE OF NATIVE CORONARY ARTERY WITHOUT ANGINA PECTORIS: Chronic | ICD-10-CM

## 2022-09-30 DIAGNOSIS — I73.9 PERIPHERAL VASCULAR DISEASE, UNSPECIFIED: Chronic | ICD-10-CM

## 2022-09-30 LAB
BASOPHILS # BLD AUTO: 0.05 K/UL — SIGNIFICANT CHANGE UP (ref 0–0.2)
BASOPHILS NFR BLD AUTO: 0.6 % — SIGNIFICANT CHANGE UP (ref 0–2)
EOSINOPHIL # BLD AUTO: 0.18 K/UL — SIGNIFICANT CHANGE UP (ref 0–0.5)
EOSINOPHIL NFR BLD AUTO: 2 % — SIGNIFICANT CHANGE UP (ref 0–6)
HCT VFR BLD CALC: 38.6 % — SIGNIFICANT CHANGE UP (ref 34.5–45)
HGB BLD-MCNC: 12.4 G/DL — SIGNIFICANT CHANGE UP (ref 11.5–15.5)
IMM GRANULOCYTES NFR BLD AUTO: 0.4 % — SIGNIFICANT CHANGE UP (ref 0–0.9)
LYMPHOCYTES # BLD AUTO: 1.55 K/UL — SIGNIFICANT CHANGE UP (ref 1–3.3)
LYMPHOCYTES # BLD AUTO: 17.3 % — SIGNIFICANT CHANGE UP (ref 13–44)
MCHC RBC-ENTMCNC: 30.7 PG — SIGNIFICANT CHANGE UP (ref 27–34)
MCHC RBC-ENTMCNC: 32.1 GM/DL — SIGNIFICANT CHANGE UP (ref 32–36)
MCV RBC AUTO: 95.5 FL — SIGNIFICANT CHANGE UP (ref 80–100)
MONOCYTES # BLD AUTO: 0.51 K/UL — SIGNIFICANT CHANGE UP (ref 0–0.9)
MONOCYTES NFR BLD AUTO: 5.7 % — SIGNIFICANT CHANGE UP (ref 2–14)
NEUTROPHILS # BLD AUTO: 6.65 K/UL — SIGNIFICANT CHANGE UP (ref 1.8–7.4)
NEUTROPHILS NFR BLD AUTO: 74 % — SIGNIFICANT CHANGE UP (ref 43–77)
NRBC # BLD: 0 /100 WBCS — SIGNIFICANT CHANGE UP (ref 0–0)
PLATELET # BLD AUTO: 214 K/UL — SIGNIFICANT CHANGE UP (ref 150–400)
RBC # BLD: 4.04 M/UL — SIGNIFICANT CHANGE UP (ref 3.8–5.2)
RBC # FLD: 14.2 % — SIGNIFICANT CHANGE UP (ref 10.3–14.5)
WBC # BLD: 8.98 K/UL — SIGNIFICANT CHANGE UP (ref 3.8–10.5)
WBC # FLD AUTO: 8.98 K/UL — SIGNIFICANT CHANGE UP (ref 3.8–10.5)

## 2022-09-30 PROCEDURE — 93010 ELECTROCARDIOGRAM REPORT: CPT | Mod: GC

## 2022-09-30 PROCEDURE — 99285 EMERGENCY DEPT VISIT HI MDM: CPT | Mod: CS,25,GC

## 2022-09-30 PROCEDURE — 99214 OFFICE O/P EST MOD 30 MIN: CPT

## 2022-09-30 RX ORDER — ACETAMINOPHEN 500 MG
975 TABLET ORAL ONCE
Refills: 0 | Status: COMPLETED | OUTPATIENT
Start: 2022-09-30 | End: 2022-09-30

## 2022-09-30 NOTE — ED PROVIDER NOTE - CLINICAL SUMMARY MEDICAL DECISION MAKING FREE TEXT BOX
81 y/o F PMH HTN, DM, HLD, CAD, MI, PAD, peripheral neuropathy, former smoker, brain mass s/p resection and RT presents with bilateral chest pain and headache concern for intracranial process complicated by ACS. Labs trop CT head. EKG shows ST elevations III, Avf, no reciprocal changes, q waves in precordial leads, unchanged from previous EKG. Dispo pending.

## 2022-09-30 NOTE — ED PROVIDER NOTE - NS ED ROS FT
General: no fever, chills  HENT: no nasal congestion, no sore throat  Eyes: no visual changes, no blurred vision  Neck: no neck pain  CV: +chest pain, no palpitations  Resp: no difficulty breathing, no cough  Abdominal: no nausea, no vomiting, no diarrhea, no abdominal pain  MSK: no muscle aches, no leg pain, no leg swelling  Neuro: +headaches  Skin: no rashes

## 2022-09-30 NOTE — ED PROVIDER NOTE - PROGRESS NOTE DETAILS
Endorsed to Dr JUAN Mims MD, Facep Phil PGY4: Spoke to Dr. Leonardo Gillespie, case endorsed to him and recommended admission to Dr. Satish Alcaraz.

## 2022-09-30 NOTE — ED PROVIDER NOTE - ATTENDING CONTRIBUTION TO CARE
Private Physician Rony.   82y female pmh Lung ca w metastatic adenoCA to brain s/p resection 7/27/22, CAD/MI, SP PTCA w stents, DM, Former smoker, HLD, HTN, PAD, Neruopathy. Pt was dc to rehab. Now comes to ed c/o not feeling well today w ha and cp onset,7p now much improved. Took tylenol w improvement in ha. PE HEENT normocephalic atraumatic +recent scar w scab on forehead. chest clear anterior & posterior cv no rubs, gallops or murmurs abd soft +bs no mass guarding neruo gcs 15 speech fluent Power 5/5 all ext  Dick Mims MD, Facep

## 2022-10-01 DIAGNOSIS — R07.9 CHEST PAIN, UNSPECIFIED: ICD-10-CM

## 2022-10-01 DIAGNOSIS — E11.10 TYPE 2 DIABETES MELLITUS WITH KETOACIDOSIS WITHOUT COMA: ICD-10-CM

## 2022-10-01 DIAGNOSIS — I48.0 PAROXYSMAL ATRIAL FIBRILLATION: ICD-10-CM

## 2022-10-01 DIAGNOSIS — I21.3 ST ELEVATION (STEMI) MYOCARDIAL INFARCTION OF UNSPECIFIED SITE: ICD-10-CM

## 2022-10-01 DIAGNOSIS — C34.90 MALIGNANT NEOPLASM OF UNSPECIFIED PART OF UNSPECIFIED BRONCHUS OR LUNG: ICD-10-CM

## 2022-10-01 DIAGNOSIS — E11.9 TYPE 2 DIABETES MELLITUS WITHOUT COMPLICATIONS: ICD-10-CM

## 2022-10-01 DIAGNOSIS — Z29.9 ENCOUNTER FOR PROPHYLACTIC MEASURES, UNSPECIFIED: ICD-10-CM

## 2022-10-01 DIAGNOSIS — I10 ESSENTIAL (PRIMARY) HYPERTENSION: ICD-10-CM

## 2022-10-01 LAB
ALBUMIN SERPL ELPH-MCNC: 3.6 G/DL — SIGNIFICANT CHANGE UP (ref 3.3–5)
ALP SERPL-CCNC: 76 U/L — SIGNIFICANT CHANGE UP (ref 40–120)
ALT FLD-CCNC: 13 U/L — SIGNIFICANT CHANGE UP (ref 10–45)
ANION GAP SERPL CALC-SCNC: 20 MMOL/L — HIGH (ref 5–17)
APTT BLD: 22.9 SEC — LOW (ref 27.5–35.5)
APTT BLD: 27.7 SEC — SIGNIFICANT CHANGE UP (ref 27.5–35.5)
AST SERPL-CCNC: 16 U/L — SIGNIFICANT CHANGE UP (ref 10–40)
BASE EXCESS BLDV CALC-SCNC: -3 MMOL/L — LOW (ref -2–3)
BILIRUB SERPL-MCNC: 0.3 MG/DL — SIGNIFICANT CHANGE UP (ref 0.2–1.2)
BUN SERPL-MCNC: 21 MG/DL — SIGNIFICANT CHANGE UP (ref 7–23)
CA-I SERPL-SCNC: 1.13 MMOL/L — LOW (ref 1.15–1.33)
CALCIUM SERPL-MCNC: 8.7 MG/DL — SIGNIFICANT CHANGE UP (ref 8.4–10.5)
CHLORIDE BLDV-SCNC: 107 MMOL/L — SIGNIFICANT CHANGE UP (ref 96–108)
CHLORIDE SERPL-SCNC: 105 MMOL/L — SIGNIFICANT CHANGE UP (ref 96–108)
CK MB BLD-MCNC: 14 % — HIGH (ref 0–3.5)
CK MB CFR SERPL CALC: 21.7 NG/ML — HIGH (ref 0–3.8)
CK MB CFR SERPL CALC: 25.9 NG/ML — HIGH (ref 0–3.8)
CK SERPL-CCNC: 155 U/L — SIGNIFICANT CHANGE UP (ref 25–170)
CO2 BLDV-SCNC: 23 MMOL/L — SIGNIFICANT CHANGE UP (ref 22–26)
CO2 SERPL-SCNC: 16 MMOL/L — LOW (ref 22–31)
CREAT SERPL-MCNC: 0.74 MG/DL — SIGNIFICANT CHANGE UP (ref 0.5–1.3)
EGFR: 81 ML/MIN/1.73M2 — SIGNIFICANT CHANGE UP
GAS PNL BLDV: 137 MMOL/L — SIGNIFICANT CHANGE UP (ref 136–145)
GAS PNL BLDV: SIGNIFICANT CHANGE UP
GAS PNL BLDV: SIGNIFICANT CHANGE UP
GLUCOSE BLDC GLUCOMTR-MCNC: 134 MG/DL — HIGH (ref 70–99)
GLUCOSE BLDC GLUCOMTR-MCNC: 191 MG/DL — HIGH (ref 70–99)
GLUCOSE BLDC GLUCOMTR-MCNC: 194 MG/DL — HIGH (ref 70–99)
GLUCOSE BLDC GLUCOMTR-MCNC: 201 MG/DL — HIGH (ref 70–99)
GLUCOSE BLDV-MCNC: 232 MG/DL — HIGH (ref 70–99)
GLUCOSE SERPL-MCNC: 241 MG/DL — HIGH (ref 70–99)
HCO3 BLDV-SCNC: 22 MMOL/L — SIGNIFICANT CHANGE UP (ref 22–29)
HCT VFR BLDA CALC: 33 % — LOW (ref 34.5–46.5)
HGB BLD CALC-MCNC: 11.1 G/DL — LOW (ref 11.7–16.1)
INR BLD: 1.01 RATIO — SIGNIFICANT CHANGE UP (ref 0.88–1.16)
INR BLD: 1.17 RATIO — HIGH (ref 0.88–1.16)
LACTATE BLDV-MCNC: 1.3 MMOL/L — SIGNIFICANT CHANGE UP (ref 0.5–2)
NT-PROBNP SERPL-SCNC: 1664 PG/ML — HIGH (ref 0–300)
PCO2 BLDV: 38 MMHG — LOW (ref 39–42)
PH BLDV: 7.37 — SIGNIFICANT CHANGE UP (ref 7.32–7.43)
PO2 BLDV: 53 MMHG — HIGH (ref 25–45)
POTASSIUM BLDV-SCNC: 3.2 MMOL/L — LOW (ref 3.5–5.1)
POTASSIUM SERPL-MCNC: 4 MMOL/L — SIGNIFICANT CHANGE UP (ref 3.5–5.3)
POTASSIUM SERPL-SCNC: 4 MMOL/L — SIGNIFICANT CHANGE UP (ref 3.5–5.3)
PROT SERPL-MCNC: 6.4 G/DL — SIGNIFICANT CHANGE UP (ref 6–8.3)
PROTHROM AB SERPL-ACNC: 11.7 SEC — SIGNIFICANT CHANGE UP (ref 10.5–13.4)
PROTHROM AB SERPL-ACNC: 13.6 SEC — HIGH (ref 10.5–13.4)
SAO2 % BLDV: 82.4 % — SIGNIFICANT CHANGE UP (ref 67–88)
SARS-COV-2 RNA SPEC QL NAA+PROBE: SIGNIFICANT CHANGE UP
SODIUM SERPL-SCNC: 141 MMOL/L — SIGNIFICANT CHANGE UP (ref 135–145)
TROPONIN T, HIGH SENSITIVITY RESULT: 120 NG/L — HIGH (ref 0–51)
TROPONIN T, HIGH SENSITIVITY RESULT: 221 NG/L — HIGH (ref 0–51)
TROPONIN T, HIGH SENSITIVITY RESULT: 430 NG/L — HIGH (ref 0–51)
TROPONIN T, HIGH SENSITIVITY RESULT: 64 NG/L — HIGH (ref 0–51)

## 2022-10-01 PROCEDURE — 99223 1ST HOSP IP/OBS HIGH 75: CPT

## 2022-10-01 PROCEDURE — 70450 CT HEAD/BRAIN W/O DYE: CPT | Mod: 26,MA

## 2022-10-01 RX ORDER — SODIUM CHLORIDE 9 MG/ML
1000 INJECTION, SOLUTION INTRAVENOUS
Refills: 0 | Status: DISCONTINUED | OUTPATIENT
Start: 2022-10-01 | End: 2022-10-05

## 2022-10-01 RX ORDER — LANOLIN ALCOHOL/MO/W.PET/CERES
3 CREAM (GRAM) TOPICAL AT BEDTIME
Refills: 0 | Status: DISCONTINUED | OUTPATIENT
Start: 2022-10-01 | End: 2022-10-05

## 2022-10-01 RX ORDER — SENNA PLUS 8.6 MG/1
2 TABLET ORAL AT BEDTIME
Refills: 0 | Status: DISCONTINUED | OUTPATIENT
Start: 2022-10-01 | End: 2022-10-05

## 2022-10-01 RX ORDER — PANTOPRAZOLE SODIUM 20 MG/1
40 TABLET, DELAYED RELEASE ORAL
Refills: 0 | Status: DISCONTINUED | OUTPATIENT
Start: 2022-10-01 | End: 2022-10-05

## 2022-10-01 RX ORDER — ONDANSETRON 8 MG/1
4 TABLET, FILM COATED ORAL EVERY 8 HOURS
Refills: 0 | Status: DISCONTINUED | OUTPATIENT
Start: 2022-10-01 | End: 2022-10-05

## 2022-10-01 RX ORDER — METOPROLOL TARTRATE 50 MG
25 TABLET ORAL
Refills: 0 | Status: DISCONTINUED | OUTPATIENT
Start: 2022-10-01 | End: 2022-10-05

## 2022-10-01 RX ORDER — ACETAMINOPHEN 500 MG
650 TABLET ORAL EVERY 6 HOURS
Refills: 0 | Status: DISCONTINUED | OUTPATIENT
Start: 2022-10-01 | End: 2022-10-05

## 2022-10-01 RX ORDER — GLUCAGON INJECTION, SOLUTION 0.5 MG/.1ML
1 INJECTION, SOLUTION SUBCUTANEOUS ONCE
Refills: 0 | Status: DISCONTINUED | OUTPATIENT
Start: 2022-10-01 | End: 2022-10-04

## 2022-10-01 RX ORDER — HEPARIN SODIUM 5000 [USP'U]/ML
500 INJECTION INTRAVENOUS; SUBCUTANEOUS
Qty: 25000 | Refills: 0 | Status: DISCONTINUED | OUTPATIENT
Start: 2022-10-01 | End: 2022-10-02

## 2022-10-01 RX ORDER — LEVETIRACETAM 250 MG/1
500 TABLET, FILM COATED ORAL
Refills: 0 | Status: DISCONTINUED | OUTPATIENT
Start: 2022-10-01 | End: 2022-10-05

## 2022-10-01 RX ORDER — GABAPENTIN 400 MG/1
200 CAPSULE ORAL AT BEDTIME
Refills: 0 | Status: DISCONTINUED | OUTPATIENT
Start: 2022-10-01 | End: 2022-10-05

## 2022-10-01 RX ORDER — HEPARIN SODIUM 5000 [USP'U]/ML
500 INJECTION INTRAVENOUS; SUBCUTANEOUS
Qty: 25000 | Refills: 0 | Status: DISCONTINUED | OUTPATIENT
Start: 2022-10-01 | End: 2022-10-01

## 2022-10-01 RX ORDER — ASPIRIN/CALCIUM CARB/MAGNESIUM 324 MG
81 TABLET ORAL DAILY
Refills: 0 | Status: DISCONTINUED | OUTPATIENT
Start: 2022-10-01 | End: 2022-10-04

## 2022-10-01 RX ORDER — DEXTROSE 50 % IN WATER 50 %
25 SYRINGE (ML) INTRAVENOUS ONCE
Refills: 0 | Status: DISCONTINUED | OUTPATIENT
Start: 2022-10-01 | End: 2022-10-04

## 2022-10-01 RX ORDER — ATORVASTATIN CALCIUM 80 MG/1
40 TABLET, FILM COATED ORAL AT BEDTIME
Refills: 0 | Status: DISCONTINUED | OUTPATIENT
Start: 2022-10-01 | End: 2022-10-05

## 2022-10-01 RX ORDER — HEPARIN SODIUM 5000 [USP'U]/ML
4000 INJECTION INTRAVENOUS; SUBCUTANEOUS EVERY 6 HOURS
Refills: 0 | Status: DISCONTINUED | OUTPATIENT
Start: 2022-10-01 | End: 2022-10-01

## 2022-10-01 RX ORDER — INFLUENZA VIRUS VACCINE 15; 15; 15; 15 UG/.5ML; UG/.5ML; UG/.5ML; UG/.5ML
0.7 SUSPENSION INTRAMUSCULAR ONCE
Refills: 0 | Status: COMPLETED | OUTPATIENT
Start: 2022-10-01 | End: 2022-10-05

## 2022-10-01 RX ORDER — DEXTROSE 50 % IN WATER 50 %
15 SYRINGE (ML) INTRAVENOUS ONCE
Refills: 0 | Status: DISCONTINUED | OUTPATIENT
Start: 2022-10-01 | End: 2022-10-05

## 2022-10-01 RX ORDER — INSULIN LISPRO 100/ML
VIAL (ML) SUBCUTANEOUS
Refills: 0 | Status: DISCONTINUED | OUTPATIENT
Start: 2022-10-01 | End: 2022-10-04

## 2022-10-01 RX ORDER — HEPARIN SODIUM 5000 [USP'U]/ML
INJECTION INTRAVENOUS; SUBCUTANEOUS
Qty: 25000 | Refills: 0 | Status: DISCONTINUED | OUTPATIENT
Start: 2022-10-01 | End: 2022-10-01

## 2022-10-01 RX ADMIN — ATORVASTATIN CALCIUM 40 MILLIGRAM(S): 80 TABLET, FILM COATED ORAL at 21:08

## 2022-10-01 RX ADMIN — Medication 1 DROP(S): at 21:07

## 2022-10-01 RX ADMIN — Medication 25 MILLIGRAM(S): at 17:16

## 2022-10-01 RX ADMIN — LEVETIRACETAM 500 MILLIGRAM(S): 250 TABLET, FILM COATED ORAL at 17:16

## 2022-10-01 RX ADMIN — Medication 3 MILLIGRAM(S): at 23:00

## 2022-10-01 RX ADMIN — Medication 1: at 17:16

## 2022-10-01 RX ADMIN — Medication 975 MILLIGRAM(S): at 00:52

## 2022-10-01 RX ADMIN — Medication 1: at 13:32

## 2022-10-01 RX ADMIN — GABAPENTIN 200 MILLIGRAM(S): 400 CAPSULE ORAL at 21:07

## 2022-10-01 RX ADMIN — Medication 650 MILLIGRAM(S): at 14:25

## 2022-10-01 RX ADMIN — HEPARIN SODIUM 5 UNIT(S)/HR: 5000 INJECTION INTRAVENOUS; SUBCUTANEOUS at 20:12

## 2022-10-01 NOTE — H&P ADULT - ASSESSMENT
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 Hospital course complicated with STEMI and metabolic encephalopathy 2/2 UTI, new onset afib, discharged to  rehab presents with acute onset of chest pain, concerning for ACS

## 2022-10-01 NOTE — H&P ADULT - PROBLEM SELECTOR PLAN 2
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 Ramon at [facility] - plan for immunotherapy in 2 weeks  - follows Dr. Funes at [facility] for RT - s/p 3 sessions of RT last week

## 2022-10-01 NOTE — H&P ADULT - NSHPLABSRESULTS_GEN_ALL_CORE
LABS:   personally reviewed                        12.4   8.98  )-----------( 214      ( 30 Sep 2022 23:12 )             38.6     09-30    141  |  105  |  21  ----------------------------<  241<H>  4.0   |  16<L>  |  0.74    Ca    8.7      30 Sep 2022 23:12    TPro  6.4  /  Alb  3.6  /  TBili  0.3  /  DBili  x   /  AST  16  /  ALT  13  /  AlkPhos  76  09-30    PT/INR - ( 30 Sep 2022 23:12 )   PT: 11.7 sec;   INR: 1.01 ratio         PTT - ( 30 Sep 2022 23:12 )  PTT:22.9 sec  Serum Pro-Brain Natriuretic Peptide: 1664 pg/mL (09-30 @ 23:12)    HS troponin: 64 -> 120 -> 221    ECG personally reviewed:  - sinus 74bpm, LVH, Q waves in inferior leads, HECTOR in lead III    CTH  < from: CT Head No Cont (10.01.22 @ 00:34) >    Ventriculomegaly compared to the prior study, which likely reflects   partial resolution of previously seen mass effect secondary to   postoperative edema on 8/3/2022. Attention on follow-up is advised as   these findings can reflect some degree of communicating hydrocephalus   (i.e. normal pressure hydrocephalus).    Postsurgical changes of the right temporal lobe with interval decrease in   vasogenic edema in this region. 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    No acute intracranial hemorrhage or mass effect.

## 2022-10-01 NOTE — ED ADULT NURSE NOTE - OBJECTIVE STATEMENT
Pt is 83 y/o female, presenting to the ED c/o chest pain and headache. As per pt, she received a R-craniotomy/resection 7/27/22 and recently d/c from rehab facility. During hospital stay, pt reports STEMI and metabolic encephalopathy w/ new onset of afib, on eliquis. PMH HTN, DM, HLD, CAD, MI, PAD, and peripheral neuropathy. As per pt, took tyenol @ 6 pm w/ minimal relief and instructed to come to ED by PCP. Upon assessment, pt AxOx3, sitting up in stretcher, and speaking in full sentences. Breathing spontaneously and unlabored, placed on continuous pulse ox, >95% RA. Abdomen is soft and non-tender to palpation. EKG done, given to MD, placed on continuous cardiac monitor. Pt moves all extremities w/ = strength. Skin is warm, dry, and intact w/ + peripheral pulses. Pt denies SOB, n/v/d, dizziness, vision changes, chills, and fever. Safety and comfort measures provided- bed in lowest position, locked, and blanket given. Son @ bedside.

## 2022-10-01 NOTE — CONSULT NOTE ADULT - ASSESSMENT
[facility] 6/23/21:   s/p successful angioplasty to the mid RCA followed by intracoronary brachytherapy.  [facility] 4/14/21: POBA of mid RCA (80%)   [facility] 3/25/21: PCI/POBA to pCX 90%   Echo 7/22/22: Normal left ventricular internal dimensions with discrete upper septal hypertrophy. Estimated ejection fraction 55%, The inferolateral wall is akinetic.  Echo 8/2/22:  grossly, preserved left ventricular systolic function with segmental wall motion abnormalities. EF approximately 55%. The mid to distal inferolateral and mid to distal inferior segments are hypkinetic. The apex is akinetic. No left ventricular thrombus seen.  limited Echo 8/8/22: EF 60-65%;  Mild segmental left ventricular systolic dysfunction; No left ventricular thrombus. The basal inferolateral wall, the basal inferior wall, the basal  anterolateral wall, and the mid inferior wall are hypokinetic.    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 Hospital course complicated with STEMI and metabolic encephalopathy 2/2 UTI, new onset afib, discharged to  rehab presents with acute onset of chest pain, concerning for ACS    #NSTEMI, CAD, s/p PCI  -chest pain now resolved  -ECG without significant dynamic changes from previous  -trop trending up with increasing CKMB  -trend trop/CK-MB  -ASA 81  -start iv hep x 48 hours if no neurosx contraindications  -had recent STEMI in 8/2022 treated medically at the time in setting of recent craniotomy  -echo 8/8/22 with EF 60-65%, basal inferolateral wall, the basal inferior wall, the basal anterolateral wall, and the mid inferior wall are hypokinetic    #Metast. Lung cancer to brain.   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   -stable, was on eliquis on last admission, but patient says she has not taken since discharge  -resume meotprolol xl 25 qd    #hypertension  -stable  -BB  -hold arb/ccb to minimize hypotension    #history of PE   -a/c if no neurosx CI     dvt ppx      70 minutes spent on total encounter; more than 50% of the visit was spent counseling and/or coordinating care by the attending physician.

## 2022-10-01 NOTE — H&P ADULT - PROBLEM SELECTOR PLAN 5
antihypertensives (losartan, amlodipine) were dc'ed last admission 2/2 orthostatic hypotension  - continue to monitor BP closely

## 2022-10-01 NOTE — H&P ADULT - PROBLEM SELECTOR PLAN 1
chest pain x 1 day, with troponin level trending up as well as mild HECTOR in lead III  - pt with hx of STEMI in 8/2022 but deemed high risk for surgery so was medically managed at that time given recent craniotomy  - TTE 8/8/22 with EF 60-65%, basal inferolateral wall, the basal inferior wall, the basal anterolateral wall, and the mid inferior wall are hypokinetic  - now improvement in chest pain but troponin continues to trend up  - continue to trend trop until peaks  - continue to trend ECGs  - appreciate cardiology recs: care discussed with Dr. Alcaraz - will start on heparin gtt foe ACS if no contraindication from NSG  - appreciate NSG recs  - admit to telemetry  - continue asa 81mg, lipitor 40mg daily

## 2022-10-01 NOTE — H&P ADULT - NSHPREVIEWOFSYSTEMS_GEN_ALL_CORE
Review of Systems:   CONSTITUTIONAL: No fever, weight loss, or fatigue  EYES: No eye pain, visual disturbances, or discharge  ENMT:  No difficulty hearing, tinnitus, vertigo; No sinus or throat pain  NECK: No pain or stiffness  BREASTS: No pain, masses, or nipple discharge  RESPIRATORY: No cough, wheezing, chills or hemoptysis; No shortness of breath  CARDIOVASCULAR: (+) chest pain, leg swelling  GASTROINTESTINAL: No abdominal or epigastric pain. No nausea, vomiting, or hematemesis; No diarrhea or constipation. No melena or hematochezia.  GENITOURINARY: No dysuria, frequency, hematuria, or incontinence  NEUROLOGICAL: No headaches, memory loss, loss of strength, numbness, or tremors  SKIN: No itching, burning, rashes, or lesions   LYMPH NODES: No enlarged glands  ENDOCRINE: No heat or cold intolerance; No hair loss  MUSCULOSKELETAL: No joint pain or swelling; No muscle, back, or extremity pain  PSYCHIATRIC: No depression, anxiety, mood swings, or difficulty sleeping  HEME/LYMPH: No easy bruising, or bleeding gums  ALLERGY AND IMMUNOLOGIC: No hives or eczema

## 2022-10-01 NOTE — CONSULT NOTE ADULT - SUBJECTIVE AND OBJECTIVE BOX
CARDIOLOGY CONSULT NOTE - DR. REDDING    HPI:  82 year old F PMH HTN, DM, HLD, CAD, MI, PAD, peripheral neuropathy, former smoker, recent admission for brain mass s/p Right craniotomy/resection 7/27/22. Hospital course complicated with STEMI and metabolic encephalopathy 2/2 UTI, new onset afib, discharged to  rehab presents with acute onset of chest pain. She states that she has been okay since discharge from rehab. The day before admission, ate lunch after which she felt excruciating pain over the left chest area, feeling severe indigestion. Not associated with radiation, diaphoresis, dyspnea, lightheadedness, palpitation, syncopal episode. She lied down in bed but the symptoms didn't improve. Since she had few heart attacks in the past, she was concerned and came to ED.     In ED, s/p tylenol. This morning, pt feels much better.  (01 Oct 2022 10:51)    feels better  resolved chest pain  no dyspnea      PAST MEDICAL & SURGICAL HISTORY:  Hypertension      Coronary artery disease      Hyperlipidemia      Peripheral artery disease      Myocardial infarct      Peripheral neuropathy      Diabetes      Chronic back pain      Former smoker      PAD (peripheral artery disease)      CAD (coronary artery disease)  stents            PREVIOUS DIAGNOSTIC TESTING:    [ ] Echocardiogram:  [ ]  Catheterization:  [ ] Stress Test:  	    MEDICATIONS:    Home Medications:  hydroCHLOROthiazide 25 mg oral tablet: 1 tab(s) orally , As Needed (01 Oct 2022 11:50)  melatonin 3 mg oral tablet: 1 tab(s) orally once a day (at bedtime) (01 Oct 2022 11:50)  Tresiba FlexTouch: 12 unit(s) subcutaneous once a day    Note:Pt gets from MD office (01 Oct 2022 11:49)  Victoza 18 mg/3 mL subcutaneous solution: 1.8 milligram(s) subcutaneous once a day (01 Oct 2022 11:50)      MEDICATIONS  (STANDING):  dextrose 5%. 1000 milliLiter(s) (50 mL/Hr) IV Continuous <Continuous>  dextrose 50% Injectable 25 Gram(s) IV Push once  glucagon  Injectable 1 milliGRAM(s) IntraMuscular once  insulin lispro (ADMELOG) corrective regimen sliding scale   SubCutaneous three times a day before meals      FAMILY HISTORY:  Family history of stroke (Sibling)        SOCIAL HISTORY:    [x ] Non-smoker  [ ] Smoker  [ ] Alcohol    Allergies    adhesives (Pruritus)  No Known Drug Allergies    Intolerances    	    REVIEW OF SYSTEMS:  CONSTITUTIONAL: No fever, weight loss, or fatigue  EYES: No eye pain, visual disturbances, or discharge  ENMT:  No difficulty hearing, tinnitus, vertigo; No sinus or throat pain  NECK: No pain or stiffness  RESPIRATORY: No cough, wheezing, chills or hemoptysis; No Shortness of Breath  CARDIOVASCULAR: as HPI  GASTROINTESTINAL: No abdominal or epigastric pain. No nausea, vomiting, or hematemesis; No diarrhea or constipation. No melena or hematochezia.  GENITOURINARY: No dysuria, frequency, hematuria, or incontinence  NEUROLOGICAL: No headaches, memory loss, loss of strength, numbness, or tremors  SKIN: No itching, burning, rashes, or lesions   	  [ ] All others negative	  [ ] Unable to obtain    PHYSICAL EXAM:    T(C): 36.8 (10-01-22 @ 11:29), Max: 37.3 (09-30-22 @ 22:37)  HR: 71 (10-01-22 @ 11:29) (60 - 81)  BP: 129/76 (10-01-22 @ 11:29) (126/79 - 180/70)  RR: 18 (10-01-22 @ 11:29) (18 - 20)  SpO2: 99% (10-01-22 @ 11:29) (98% - 100%)  Wt(kg): --  I&O's Summary    Daily Height in cm: 165.1 (30 Sep 2022 22:37)    Daily     Appearance: Normal	  Psychiatry: A & O x 3, Mood & affect appropriate  HEENT:   Normal oral mucosa, PERRL, EOMI	  Lymphatic: No lymphadenopathy  Cardiovascular: Normal S1 S2,RRR, No JVD, No murmurs  Respiratory: Lungs clear to auscultation	  Gastrointestinal:  Soft, Non-tender, + BS	  Skin: No rashes, No ecchymoses, No cyanosis	  Neurologic: Non-focal  Extremities: Normal range of motion, No clubbing, cyanosis or edema  Vascular: Peripheral pulses palpable 2+ bilaterally    TELEMETRY: 	    ECG:  	sr, old inf/inf lat mi  RADIOLOGY:  OTHER: 	  	  LABS:	 	    CARDIAC MARKERS:        proBNP: Serum Pro-Brain Natriuretic Peptide: 1664 pg/mL (09-30 @ 23:12)      Lipid Profile:   HgA1c:   TSH:                           12.4   8.98  )-----------( 214      ( 30 Sep 2022 23:12 )             38.6     09-30    141  |  105  |  21  ----------------------------<  241<H>  4.0   |  16<L>  |  0.74    Ca    8.7      30 Sep 2022 23:12    TPro  6.4  /  Alb  3.6  /  TBili  0.3  /  DBili  x   /  AST  16  /  ALT  13  /  AlkPhos  76  09-30    PT/INR - ( 30 Sep 2022 23:12 )   PT: 11.7 sec;   INR: 1.01 ratio         PTT - ( 30 Sep 2022 23:12 )  PTT:22.9 sec    Creatinine, Serum: 0.74 mg/dL (09-30-22 @ 23:12)        ASSESSMENT/PLAN:

## 2022-10-01 NOTE — H&P ADULT - NSHPADDITIONALINFOADULT_GEN_ALL_CORE
above plans discussed with NP Raul Jacinto MD  Division of Hospital Medicine  Contact via Microsoft Teams  Office: [phone]

## 2022-10-01 NOTE — H&P ADULT - PROBLEM SELECTOR PLAN 3
currently in sinus rhythm  - pt was started on eliquis on last admission, but seems like she wasn't on it at home  - continue lopressor 25mg BID  - will verify with pharmacy

## 2022-10-01 NOTE — H&P ADULT - NSHPPHYSICALEXAM_GEN_ALL_CORE
Vital Signs Last 24 Hrs  T(C): 36.8 (01 Oct 2022 04:49), Max: 37.3 (30 Sep 2022 22:37)  T(F): 98.2 (01 Oct 2022 04:49), Max: 99.2 (30 Sep 2022 22:37)  HR: 60 (01 Oct 2022 04:49) (60 - 81)  BP: 139/83 (01 Oct 2022 04:49) (126/79 - 180/70)  BP(mean): --  RR: 18 (01 Oct 2022 04:49) (18 - 20)  SpO2: 100% (01 Oct 2022 04:49) (98% - 100%)    Parameters below as of 01 Oct 2022 04:49  Patient On (Oxygen Delivery Method): room air      CAPILLARY BLOOD GLUCOSE      POCT Blood Glucose.: 134 mg/dL (01 Oct 2022 08:54)    I&O's Summary      PHYSICAL EXAM:  GENERAL: NAD, well-developed  HEAD:  Atraumatic, Normocephalic  EYES: EOMI, PERRLA, conjunctiva and sclera clear  MOUTH: no oral thrush  NECK: Supple, No JVD  CHEST/LUNG: Clear to auscultation bilaterally; No wheeze  HEART: Regular rate and rhythm; No murmurs, rubs, or gallops  ABDOMEN: Soft, Nontender, Nondistended; Bowel sounds present  EXTREMITIES:  trace pitting edema in bilateral LEs  NEUROLOGY: AAOx3, non-focal  SKIN: No rashes or lesions

## 2022-10-01 NOTE — H&P ADULT - HISTORY OF PRESENT ILLNESS
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 Hospital course complicated with STEMI and metabolic encephalopathy 2/2 UTI, new onset afib, discharged to  rehab presents with acute onset of chest pain. She states that she has been okay since discharge from rehab. The day before admission, ate lunch after which she felt excruciating pain over the left chest area, feeling severe indigestion. Not associated with radiation, diaphoresis, dyspnea, lightheadedness, palpitation, syncopal episode. She lied down in bed but the symptoms didn't improve. Since she had few heart attacks in the past, she was concerned and came to ED.     In ED, s/p tylenol. This morning, pt feels much better.

## 2022-10-02 LAB
A1C WITH ESTIMATED AVERAGE GLUCOSE RESULT: 7.4 % — HIGH (ref 4–5.6)
ANION GAP SERPL CALC-SCNC: 17 MMOL/L — SIGNIFICANT CHANGE UP (ref 5–17)
APTT BLD: 28.2 SEC — SIGNIFICANT CHANGE UP (ref 27.5–35.5)
APTT BLD: 31.9 SEC — SIGNIFICANT CHANGE UP (ref 27.5–35.5)
APTT BLD: 54 SEC — HIGH (ref 27.5–35.5)
BUN SERPL-MCNC: 10 MG/DL — SIGNIFICANT CHANGE UP (ref 7–23)
CALCIUM SERPL-MCNC: 8.4 MG/DL — SIGNIFICANT CHANGE UP (ref 8.4–10.5)
CHLORIDE SERPL-SCNC: 106 MMOL/L — SIGNIFICANT CHANGE UP (ref 96–108)
CK MB BLD-MCNC: 10 % — HIGH (ref 0–3.5)
CK MB CFR SERPL CALC: 11 NG/ML — HIGH (ref 0–3.8)
CK SERPL-CCNC: 110 U/L — SIGNIFICANT CHANGE UP (ref 25–170)
CO2 SERPL-SCNC: 21 MMOL/L — LOW (ref 22–31)
CREAT SERPL-MCNC: 0.76 MG/DL — SIGNIFICANT CHANGE UP (ref 0.5–1.3)
EGFR: 78 ML/MIN/1.73M2 — SIGNIFICANT CHANGE UP
ESTIMATED AVERAGE GLUCOSE: 166 MG/DL — HIGH (ref 68–114)
GLUCOSE BLDC GLUCOMTR-MCNC: 201 MG/DL — HIGH (ref 70–99)
GLUCOSE BLDC GLUCOMTR-MCNC: 208 MG/DL — HIGH (ref 70–99)
GLUCOSE BLDC GLUCOMTR-MCNC: 219 MG/DL — HIGH (ref 70–99)
GLUCOSE BLDC GLUCOMTR-MCNC: 290 MG/DL — HIGH (ref 70–99)
GLUCOSE SERPL-MCNC: 315 MG/DL — HIGH (ref 70–99)
HCT VFR BLD CALC: 33.5 % — LOW (ref 34.5–45)
HCT VFR BLD CALC: 34.9 % — SIGNIFICANT CHANGE UP (ref 34.5–45)
HGB BLD-MCNC: 11.1 G/DL — LOW (ref 11.5–15.5)
HGB BLD-MCNC: 11.3 G/DL — LOW (ref 11.5–15.5)
INR BLD: 1.14 RATIO — SIGNIFICANT CHANGE UP (ref 0.88–1.16)
MAGNESIUM SERPL-MCNC: 0.9 MG/DL — CRITICAL LOW (ref 1.6–2.6)
MAGNESIUM SERPL-MCNC: 1.4 MG/DL — LOW (ref 1.6–2.6)
MCHC RBC-ENTMCNC: 30.8 PG — SIGNIFICANT CHANGE UP (ref 27–34)
MCHC RBC-ENTMCNC: 30.8 PG — SIGNIFICANT CHANGE UP (ref 27–34)
MCHC RBC-ENTMCNC: 32.4 GM/DL — SIGNIFICANT CHANGE UP (ref 32–36)
MCHC RBC-ENTMCNC: 33.1 GM/DL — SIGNIFICANT CHANGE UP (ref 32–36)
MCV RBC AUTO: 93.1 FL — SIGNIFICANT CHANGE UP (ref 80–100)
MCV RBC AUTO: 95.1 FL — SIGNIFICANT CHANGE UP (ref 80–100)
NRBC # BLD: 0 /100 WBCS — SIGNIFICANT CHANGE UP (ref 0–0)
NRBC # BLD: 0 /100 WBCS — SIGNIFICANT CHANGE UP (ref 0–0)
PLATELET # BLD AUTO: 215 K/UL — SIGNIFICANT CHANGE UP (ref 150–400)
PLATELET # BLD AUTO: 279 K/UL — SIGNIFICANT CHANGE UP (ref 150–400)
POTASSIUM SERPL-MCNC: 3.4 MMOL/L — LOW (ref 3.5–5.3)
POTASSIUM SERPL-MCNC: 4 MMOL/L — SIGNIFICANT CHANGE UP (ref 3.5–5.3)
POTASSIUM SERPL-SCNC: 3.4 MMOL/L — LOW (ref 3.5–5.3)
POTASSIUM SERPL-SCNC: 4 MMOL/L — SIGNIFICANT CHANGE UP (ref 3.5–5.3)
PROTHROM AB SERPL-ACNC: 13.1 SEC — SIGNIFICANT CHANGE UP (ref 10.5–13.4)
RBC # BLD: 3.6 M/UL — LOW (ref 3.8–5.2)
RBC # BLD: 3.67 M/UL — LOW (ref 3.8–5.2)
RBC # FLD: 14 % — SIGNIFICANT CHANGE UP (ref 10.3–14.5)
RBC # FLD: 14.3 % — SIGNIFICANT CHANGE UP (ref 10.3–14.5)
SODIUM SERPL-SCNC: 144 MMOL/L — SIGNIFICANT CHANGE UP (ref 135–145)
TROPONIN T, HIGH SENSITIVITY RESULT: 730 NG/L — HIGH (ref 0–51)
WBC # BLD: 6.22 K/UL — SIGNIFICANT CHANGE UP (ref 3.8–10.5)
WBC # BLD: 7.6 K/UL — SIGNIFICANT CHANGE UP (ref 3.8–10.5)
WBC # FLD AUTO: 6.22 K/UL — SIGNIFICANT CHANGE UP (ref 3.8–10.5)
WBC # FLD AUTO: 7.6 K/UL — SIGNIFICANT CHANGE UP (ref 3.8–10.5)

## 2022-10-02 PROCEDURE — 93010 ELECTROCARDIOGRAM REPORT: CPT

## 2022-10-02 RX ORDER — HEPARIN SODIUM 5000 [USP'U]/ML
900 INJECTION INTRAVENOUS; SUBCUTANEOUS
Qty: 25000 | Refills: 0 | Status: DISCONTINUED | OUTPATIENT
Start: 2022-10-02 | End: 2022-10-04

## 2022-10-02 RX ORDER — HEPARIN SODIUM 5000 [USP'U]/ML
700 INJECTION INTRAVENOUS; SUBCUTANEOUS
Qty: 25000 | Refills: 0 | Status: DISCONTINUED | OUTPATIENT
Start: 2022-10-02 | End: 2022-10-02

## 2022-10-02 RX ORDER — CHLORHEXIDINE GLUCONATE 213 G/1000ML
1 SOLUTION TOPICAL DAILY
Refills: 0 | Status: DISCONTINUED | OUTPATIENT
Start: 2022-10-02 | End: 2022-10-05

## 2022-10-02 RX ORDER — POTASSIUM CHLORIDE 20 MEQ
20 PACKET (EA) ORAL
Refills: 0 | Status: COMPLETED | OUTPATIENT
Start: 2022-10-02 | End: 2022-10-02

## 2022-10-02 RX ORDER — MAGNESIUM SULFATE 500 MG/ML
2 VIAL (ML) INJECTION ONCE
Refills: 0 | Status: COMPLETED | OUTPATIENT
Start: 2022-10-02 | End: 2022-10-02

## 2022-10-02 RX ADMIN — Medication 1 DROP(S): at 06:08

## 2022-10-02 RX ADMIN — Medication 3: at 11:44

## 2022-10-02 RX ADMIN — Medication 1 DROP(S): at 13:09

## 2022-10-02 RX ADMIN — Medication 3 MILLIGRAM(S): at 22:09

## 2022-10-02 RX ADMIN — Medication 20 MILLIEQUIVALENT(S): at 13:08

## 2022-10-02 RX ADMIN — ATORVASTATIN CALCIUM 40 MILLIGRAM(S): 80 TABLET, FILM COATED ORAL at 22:10

## 2022-10-02 RX ADMIN — Medication 25 MILLIGRAM(S): at 17:04

## 2022-10-02 RX ADMIN — Medication 25 GRAM(S): at 12:22

## 2022-10-02 RX ADMIN — Medication 2: at 17:04

## 2022-10-02 RX ADMIN — GABAPENTIN 200 MILLIGRAM(S): 400 CAPSULE ORAL at 22:10

## 2022-10-02 RX ADMIN — HEPARIN SODIUM 9 UNIT(S)/HR: 5000 INJECTION INTRAVENOUS; SUBCUTANEOUS at 18:29

## 2022-10-02 RX ADMIN — HEPARIN SODIUM 7 UNIT(S)/HR: 5000 INJECTION INTRAVENOUS; SUBCUTANEOUS at 04:09

## 2022-10-02 RX ADMIN — Medication 81 MILLIGRAM(S): at 11:18

## 2022-10-02 RX ADMIN — HEPARIN SODIUM 9 UNIT(S)/HR: 5000 INJECTION INTRAVENOUS; SUBCUTANEOUS at 11:19

## 2022-10-02 RX ADMIN — Medication 1 DROP(S): at 22:09

## 2022-10-02 RX ADMIN — LEVETIRACETAM 500 MILLIGRAM(S): 250 TABLET, FILM COATED ORAL at 17:04

## 2022-10-02 RX ADMIN — CHLORHEXIDINE GLUCONATE 1 APPLICATION(S): 213 SOLUTION TOPICAL at 11:19

## 2022-10-02 RX ADMIN — SENNA PLUS 2 TABLET(S): 8.6 TABLET ORAL at 22:10

## 2022-10-02 RX ADMIN — Medication 25 MILLIGRAM(S): at 06:07

## 2022-10-02 RX ADMIN — Medication 2: at 08:00

## 2022-10-02 RX ADMIN — LEVETIRACETAM 500 MILLIGRAM(S): 250 TABLET, FILM COATED ORAL at 06:06

## 2022-10-02 RX ADMIN — Medication 20 MILLIEQUIVALENT(S): at 14:03

## 2022-10-02 RX ADMIN — PANTOPRAZOLE SODIUM 40 MILLIGRAM(S): 20 TABLET, DELAYED RELEASE ORAL at 06:07

## 2022-10-02 RX ADMIN — Medication 25 GRAM(S): at 22:32

## 2022-10-02 NOTE — PHYSICAL THERAPY INITIAL EVALUATION ADULT - TRANSFER TRAINING, PT EVAL
GOAL: Patient will perform sit to stand transfers independently at rolling walker with proper hand placement in 2 weeks

## 2022-10-02 NOTE — PHYSICAL THERAPY INITIAL EVALUATION ADULT - ADDITIONAL COMMENTS
Pt reports living alone in [facility] with 3 steps to enter. Pt reports having 24/7 paid aide to assist when she returns home. Pt owns commode and RW already

## 2022-10-02 NOTE — PROGRESS NOTE ADULT - SUBJECTIVE AND OBJECTIVE BOX
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 Hospital course complicated with STEMI and metabolic encephalopathy 2/2 UTI, new onset afib, discharged to  rehab presents with acute onset of chest pain. She states that she has been okay since discharge from rehab. The day before admission, ate lunch after which she felt excruciating pain over the left chest area, feeling severe indigestion. Not associated with radiation, diaphoresis, dyspnea, lightheadedness, palpitation, syncopal episode. She lied down in bed but the symptoms didn't improve. Since she had few heart attacks in the past, she was concerned and came to ED.     In ED, s/p tylenol. This morning, pt feels much better.  (01 Oct 2022 10:51)    10-02-22 @ 13:18  PAST MEDICAL & SURGICAL HISTORY:  Hypertension      Coronary artery disease      Hyperlipidemia      Peripheral artery disease      Myocardial infarct      Peripheral neuropathy      Diabetes      Chronic back pain      Former smoker      PAD (peripheral artery disease)      CAD (coronary artery disease)  stents          Review of Systems:   CONSTITUTIONAL: No fever, weight loss, or fatigue  EYES: No eye pain, visual disturbances, or discharge  ENMT:  No difficulty hearing, tinnitus, vertigo; No sinus or throat pain  NECK: No pain or stiffness  BREASTS: No pain, masses, or nipple discharge  RESPIRATORY: No cough, wheezing, chills or hemoptysis; No shortness of breath  CARDIOVASCULAR: see above  GASTROINTESTINAL: No abdominal or epigastric pain. No nausea, vomiting, or hematemesis; No diarrhea or constipation. No melena or hematochezia.  GENITOURINARY: No dysuria, frequency, hematuria, or incontinence  NEUROLOGICAL: No headaches, memory loss, loss of strength, numbness, or tremors  SKIN: No itching, burning, rashes, or lesions   LYMPH NODES: No enlarged glands  ENDOCRINE: No heat or cold intolerance; No hair loss  MUSCULOSKELETAL: No joint pain or swelling; No muscle, back, or extremity pain  PSYCHIATRIC: No depression, anxiety, mood swings, or difficulty sleeping  HEME/LYMPH: No easy bruising, or bleeding gums  ALLERY AND IMMUNOLOGIC: No hives or eczema    Allergies    adhesives (Pruritus)  No Known Drug Allergies    Intolerances        Social History:     FAMILY HISTORY:  Family history of stroke (Sibling)        MEDICATIONS  (STANDING):  artificial  tears Solution 1 Drop(s) Both EYES three times a day  aspirin enteric coated 81 milliGRAM(s) Oral daily  atorvastatin 40 milliGRAM(s) Oral at bedtime  chlorhexidine 2% Cloths 1 Application(s) Topical daily  dextrose 5%. 1000 milliLiter(s) (50 mL/Hr) IV Continuous <Continuous>  dextrose 50% Injectable 25 Gram(s) IV Push once  gabapentin 200 milliGRAM(s) Oral at bedtime  glucagon  Injectable 1 milliGRAM(s) IntraMuscular once  heparin  Infusion 900 Unit(s)/Hr (9 mL/Hr) IV Continuous <Continuous>  influenza  Vaccine (HIGH DOSE) 0.7 milliLiter(s) IntraMuscular once  insulin lispro (ADMELOG) corrective regimen sliding scale   SubCutaneous three times a day before meals  levETIRAcetam 500 milliGRAM(s) Oral two times a day  metoprolol tartrate 25 milliGRAM(s) Oral two times a day  pantoprazole    Tablet 40 milliGRAM(s) Oral before breakfast  potassium chloride    Tablet ER 20 milliEquivalent(s) Oral every 2 hours  senna 2 Tablet(s) Oral at bedtime    MEDICATIONS  (PRN):  acetaminophen     Tablet .. 650 milliGRAM(s) Oral every 6 hours PRN Temp greater or equal to 38C (100.4F), Mild Pain (1 - 3)  aluminum hydroxide/magnesium hydroxide/simethicone Suspension 30 milliLiter(s) Oral every 4 hours PRN Dyspepsia  dextrose Oral Gel 15 Gram(s) Oral once PRN Blood Glucose LESS THAN 70 milliGRAM(s)/deciliter  melatonin 3 milliGRAM(s) Oral at bedtime PRN Insomnia  ondansetron Injectable 4 milliGRAM(s) IV Push every 8 hours PRN Nausea and/or Vomiting      Vital Signs Last 24 Hrs  T(C): 37 (02 Oct 2022 11:23), Max: 37.1 (01 Oct 2022 15:17)  T(F): 98.6 (02 Oct 2022 11:23), Max: 98.8 (01 Oct 2022 20:20)  HR: 66 (02 Oct 2022 11:23) (66 - 91)  BP: 119/75 (02 Oct 2022 11:23) (119/75 - 162/83)  BP(mean): --  RR: 18 (02 Oct 2022 11:23) (18 - 18)  SpO2: 93% (02 Oct 2022 11:23) (93% - 98%)    Parameters below as of 02 Oct 2022 11:23  Patient On (Oxygen Delivery Method): room air      CAPILLARY BLOOD GLUCOSE      POCT Blood Glucose.: 290 mg/dL (02 Oct 2022 11:33)  POCT Blood Glucose.: 219 mg/dL (02 Oct 2022 07:59)  POCT Blood Glucose.: 201 mg/dL (01 Oct 2022 21:30)  POCT Blood Glucose.: 194 mg/dL (01 Oct 2022 16:51)    I&O's Summary      PHYSICAL EXAM:  GENERAL: NAD, well-developed  HEAD:  Atraumatic, Normocephalic  EYES: EOMI, PERRLA, conjunctiva and sclera clear  NECK: Supple, No JVD  CHEST/LUNG: Clear to auscultation bilaterally; No wheeze  HEART: Regular rate and rhythm; No murmurs, rubs, or gallops  ABDOMEN: Soft, Nontender, Nondistended; Bowel sounds present  EXTREMITIES:  2+ Peripheral Pulses, No clubbing, cyanosis, or edema  PSYCH: AAOx3  NEUROLOGY: non-focal  SKIN: No rashes or lesions    LABS:                        11.3   7.60  )-----------( 279      ( 02 Oct 2022 09:51 )             34.9     10-02    144  |  106  |  10  ----------------------------<  315<H>  3.4<L>   |  21<L>  |  0.76    Ca    8.4      02 Oct 2022 09:51  Mg     .9     10-02    TPro  6.4  /  Alb  3.6  /  TBili  0.3  /  DBili  x   /  AST  16  /  ALT  13  /  AlkPhos  76  09-30    PT/INR - ( 02 Oct 2022 09:51 )   PT: 13.1 sec;   INR: 1.14 ratio         PTT - ( 02 Oct 2022 09:51 )  PTT:28.2 sec  CARDIAC MARKERS ( 02 Oct 2022 09:51 )  x     / x     / 110 U/L / x     / 11.0 ng/mL  CARDIAC MARKERS ( 01 Oct 2022 13:55 )  x     / x     / x     / x     / 25.9 ng/mL  CARDIAC MARKERS ( 01 Oct 2022 06:15 )  x     / x     / 155 U/L / x     / 21.7 ng/mL  CARDIAC MARKERS ( 30 Sep 2022 23:12 )  x     / x     / 77 U/L / x     / 5.6 ng/mL          RADIOLOGY & ADDITIONAL TESTS:    Imaging Personally Reviewed:    Consultant(s) Notes Reviewed:      Care Discussed with Consultants/Other Providers:

## 2022-10-02 NOTE — PHYSICAL THERAPY INITIAL EVALUATION ADULT - PERTINENT HX OF CURRENT PROBLEM, REHAB EVAL
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 Hospital course complicated with STEMI and metabolic encephalopathy 2/2 UTI, new onset afib, discharged to  rehab presents with acute onset of chest pain. She states that she has been okay since discharge from rehab. The day before admission, ate lunch after which she felt excruciating pain over the left chest area, feeling severe indigestion. Not associated with radiation, diaphoresis, dyspnea, lightheadedness, palpitation, syncopal episode. She lied down in bed but the symptoms didn't improve. Since she had few heart attacks in the past, she was concerned and came to ED. In ED, s/p tylenol. This morning, pt feels much better. HOSPITAL COURSE: CT Head 10/1: No acute intracranial hemorrhage or mass effect

## 2022-10-02 NOTE — PROGRESS NOTE ADULT - SUBJECTIVE AND OBJECTIVE BOX
CARDIOLOGY FOLLOW UP NOTE - DR. REDDING    Patient Name: LANIE BERTRAND  Date of Service: 10-02-22 @ 11:36    Patient seen and examined  feels better  no chest pain      Subjective:    cv: denies chest pain, dyspnea, palpitations, dizziness  pulmonary: denies cough  GI: denies abdominal pain, nausea, vomiting  vascular/legs: no edema   skin: no rash  ROS: otherwise negative   overnight events:      PHYSICAL EXAM:  T(C): 37 (10-02-22 @ 11:23), Max: 37.1 (10-01-22 @ 15:17)  HR: 66 (10-02-22 @ 11:23) (66 - 91)  BP: 119/75 (10-02-22 @ 11:23) (119/75 - 162/83)  RR: 18 (10-02-22 @ 11:23) (18 - 18)  SpO2: 93% (10-02-22 @ 11:23) (93% - 98%)  Wt(kg): --  I&O's Summary    Daily     Daily     Appearance: Normal	  Cardiovascular: Normal S1 S2,RRR, No JVD, No murmurs  Respiratory: Lungs clear to auscultation	  Gastrointestinal:  Soft, Non-tender, + BS	  Extremities: Normal range of motion, No clubbing, cyanosis or edema      Home Medications:  hydroCHLOROthiazide 25 mg oral tablet: 1 tab(s) orally , As Needed (01 Oct 2022 11:50)  melatonin 3 mg oral tablet: 1 tab(s) orally once a day (at bedtime) (01 Oct 2022 11:50)  Tresiba FlexTouch: 12 unit(s) subcutaneous once a day    Note:Pt gets from MD office (01 Oct 2022 11:49)  Victoza 18 mg/3 mL subcutaneous solution: 1.8 milligram(s) subcutaneous once a day (01 Oct 2022 11:50)      MEDICATIONS  (STANDING):  artificial  tears Solution 1 Drop(s) Both EYES three times a day  aspirin enteric coated 81 milliGRAM(s) Oral daily  atorvastatin 40 milliGRAM(s) Oral at bedtime  chlorhexidine 2% Cloths 1 Application(s) Topical daily  dextrose 5%. 1000 milliLiter(s) (50 mL/Hr) IV Continuous <Continuous>  dextrose 50% Injectable 25 Gram(s) IV Push once  gabapentin 200 milliGRAM(s) Oral at bedtime  glucagon  Injectable 1 milliGRAM(s) IntraMuscular once  heparin  Infusion 900 Unit(s)/Hr (9 mL/Hr) IV Continuous <Continuous>  influenza  Vaccine (HIGH DOSE) 0.7 milliLiter(s) IntraMuscular once  insulin lispro (ADMELOG) corrective regimen sliding scale   SubCutaneous three times a day before meals  levETIRAcetam 500 milliGRAM(s) Oral two times a day  metoprolol tartrate 25 milliGRAM(s) Oral two times a day  pantoprazole    Tablet 40 milliGRAM(s) Oral before breakfast  senna 2 Tablet(s) Oral at bedtime      TELEMETRY: 	    ECG:  	  RADIOLOGY:   DIAGNOSTIC TESTING:  [ ] Echocardiogram:  [ ] Catheterization:  [ ] Stress Test:    OTHER: 	    LABS:	 	    CARDIAC MARKERS:        Troponin T, High Sensitivity Result: 730 ng/L (10-02 @ 09:51)  Troponin T, High Sensitivity Result: 430 ng/L (10-01 @ 13:55)  Troponin T, High Sensitivity Result: 221 ng/L (10-01 @ 06:15)  Troponin T, High Sensitivity Result: 120 ng/L (10-01 @ 02:17)  Troponin T, High Sensitivity Result: 64 ng/L (09-30 @ 23:12)                                11.3   7.60  )-----------( 279      ( 02 Oct 2022 09:51 )             34.9     09-30    141  |  105  |  21  ----------------------------<  241<H>  4.0   |  16<L>  |  0.74    Ca    8.7      30 Sep 2022 23:12    TPro  6.4  /  Alb  3.6  /  TBili  0.3  /  DBili  x   /  AST  16  /  ALT  13  /  AlkPhos  76  09-30    proBNP:   PT/INR - ( 02 Oct 2022 09:51 )   PT: 13.1 sec;   INR: 1.14 ratio         PTT - ( 02 Oct 2022 09:51 )  PTT:28.2 sec  Lipid Profile:   HgA1c:     Creatinine, Serum: 0.74 mg/dL (09-30-22 @ 23:12)

## 2022-10-02 NOTE — PHYSICAL THERAPY INITIAL EVALUATION ADULT - BALANCE TRAINING, PT EVAL
GOAL: Patient will demonstrate an increase in static/dynamic balance in sitting/standing where deficient by at least 1 grade to facilitate greater independence during functional mobility and ADL's in 2 weeks

## 2022-10-03 ENCOUNTER — NON-APPOINTMENT (OUTPATIENT)
Age: 82
End: 2022-10-03

## 2022-10-03 LAB
ANION GAP SERPL CALC-SCNC: 13 MMOL/L — SIGNIFICANT CHANGE UP (ref 5–17)
APTT BLD: 55 SEC — HIGH (ref 27.5–35.5)
APTT BLD: 57.9 SEC — HIGH (ref 27.5–35.5)
APTT BLD: 89.1 SEC — HIGH (ref 27.5–35.5)
BUN SERPL-MCNC: 12 MG/DL — SIGNIFICANT CHANGE UP (ref 7–23)
CALCIUM SERPL-MCNC: 8.7 MG/DL — SIGNIFICANT CHANGE UP (ref 8.4–10.5)
CHLORIDE SERPL-SCNC: 106 MMOL/L — SIGNIFICANT CHANGE UP (ref 96–108)
CK MB BLD-MCNC: 5.7 % — HIGH (ref 0–3.5)
CK MB CFR SERPL CALC: 2.6 NG/ML — SIGNIFICANT CHANGE UP (ref 0–3.8)
CK SERPL-CCNC: 46 U/L — SIGNIFICANT CHANGE UP (ref 25–170)
CO2 SERPL-SCNC: 21 MMOL/L — LOW (ref 22–31)
CREAT SERPL-MCNC: 0.78 MG/DL — SIGNIFICANT CHANGE UP (ref 0.5–1.3)
EGFR: 76 ML/MIN/1.73M2 — SIGNIFICANT CHANGE UP
GLUCOSE BLDC GLUCOMTR-MCNC: 200 MG/DL — HIGH (ref 70–99)
GLUCOSE BLDC GLUCOMTR-MCNC: 238 MG/DL — HIGH (ref 70–99)
GLUCOSE BLDC GLUCOMTR-MCNC: 253 MG/DL — HIGH (ref 70–99)
GLUCOSE BLDC GLUCOMTR-MCNC: 259 MG/DL — HIGH (ref 70–99)
GLUCOSE SERPL-MCNC: 244 MG/DL — HIGH (ref 70–99)
HCT VFR BLD CALC: 34.2 % — LOW (ref 34.5–45)
HGB BLD-MCNC: 11 G/DL — LOW (ref 11.5–15.5)
INR BLD: 1.06 RATIO — SIGNIFICANT CHANGE UP (ref 0.88–1.16)
MAGNESIUM SERPL-MCNC: 1.8 MG/DL — SIGNIFICANT CHANGE UP (ref 1.6–2.6)
MCHC RBC-ENTMCNC: 31 PG — SIGNIFICANT CHANGE UP (ref 27–34)
MCHC RBC-ENTMCNC: 32.2 GM/DL — SIGNIFICANT CHANGE UP (ref 32–36)
MCV RBC AUTO: 96.3 FL — SIGNIFICANT CHANGE UP (ref 80–100)
NRBC # BLD: 0 /100 WBCS — SIGNIFICANT CHANGE UP (ref 0–0)
PLATELET # BLD AUTO: 201 K/UL — SIGNIFICANT CHANGE UP (ref 150–400)
POTASSIUM SERPL-MCNC: 3.6 MMOL/L — SIGNIFICANT CHANGE UP (ref 3.5–5.3)
POTASSIUM SERPL-SCNC: 3.6 MMOL/L — SIGNIFICANT CHANGE UP (ref 3.5–5.3)
PROTHROM AB SERPL-ACNC: 12.2 SEC — SIGNIFICANT CHANGE UP (ref 10.5–13.4)
RBC # BLD: 3.55 M/UL — LOW (ref 3.8–5.2)
RBC # FLD: 14.2 % — SIGNIFICANT CHANGE UP (ref 10.3–14.5)
SODIUM SERPL-SCNC: 140 MMOL/L — SIGNIFICANT CHANGE UP (ref 135–145)
TROPONIN T, HIGH SENSITIVITY RESULT: 697 NG/L — HIGH (ref 0–51)
WBC # BLD: 5.73 K/UL — SIGNIFICANT CHANGE UP (ref 3.8–10.5)
WBC # FLD AUTO: 5.73 K/UL — SIGNIFICANT CHANGE UP (ref 3.8–10.5)

## 2022-10-03 PROCEDURE — 93306 TTE W/DOPPLER COMPLETE: CPT | Mod: 26

## 2022-10-03 RX ADMIN — GABAPENTIN 200 MILLIGRAM(S): 400 CAPSULE ORAL at 21:13

## 2022-10-03 RX ADMIN — HEPARIN SODIUM 8 UNIT(S)/HR: 5000 INJECTION INTRAVENOUS; SUBCUTANEOUS at 00:42

## 2022-10-03 RX ADMIN — ATORVASTATIN CALCIUM 40 MILLIGRAM(S): 80 TABLET, FILM COATED ORAL at 21:13

## 2022-10-03 RX ADMIN — PANTOPRAZOLE SODIUM 40 MILLIGRAM(S): 20 TABLET, DELAYED RELEASE ORAL at 05:25

## 2022-10-03 RX ADMIN — Medication 1 DROP(S): at 21:14

## 2022-10-03 RX ADMIN — CHLORHEXIDINE GLUCONATE 1 APPLICATION(S): 213 SOLUTION TOPICAL at 11:36

## 2022-10-03 RX ADMIN — Medication 1 DROP(S): at 15:21

## 2022-10-03 RX ADMIN — LEVETIRACETAM 500 MILLIGRAM(S): 250 TABLET, FILM COATED ORAL at 17:21

## 2022-10-03 RX ADMIN — Medication 25 MILLIGRAM(S): at 17:21

## 2022-10-03 RX ADMIN — Medication 25 MILLIGRAM(S): at 05:24

## 2022-10-03 RX ADMIN — LEVETIRACETAM 500 MILLIGRAM(S): 250 TABLET, FILM COATED ORAL at 05:25

## 2022-10-03 RX ADMIN — Medication 1: at 11:35

## 2022-10-03 RX ADMIN — Medication 3 MILLIGRAM(S): at 21:19

## 2022-10-03 RX ADMIN — Medication 81 MILLIGRAM(S): at 11:33

## 2022-10-03 RX ADMIN — HEPARIN SODIUM 8 UNIT(S)/HR: 5000 INJECTION INTRAVENOUS; SUBCUTANEOUS at 21:15

## 2022-10-03 RX ADMIN — Medication 2: at 16:49

## 2022-10-03 RX ADMIN — Medication 3: at 08:00

## 2022-10-03 RX ADMIN — Medication 1 DROP(S): at 05:25

## 2022-10-03 NOTE — PROGRESS NOTE ADULT - SUBJECTIVE AND OBJECTIVE BOX
CARDIOLOGY FOLLOW UP - Dr. Alcaraz  DATE OF SERVICE: 10/3/22     CC no cp or sob   no HA, dizziness       REVIEW OF SYSTEMS:  CONSTITUTIONAL: No fever, weight loss, or fatigue  RESPIRATORY: No cough, wheezing, chills or hemoptysis; No Shortness of Breath  CARDIOVASCULAR: No chest pain, palpitations, passing out, dizziness, or leg swelling  GASTROINTESTINAL: No abdominal or epigastric pain. No nausea, vomiting, or hematemesis; No diarrhea or constipation. No melena or hematochezia.  VASCULAR: No edema     PHYSICAL EXAM:  T(C): 36.5 (10-03-22 @ 04:46), Max: 36.7 (10-02-22 @ 20:49)  HR: 68 (10-03-22 @ 04:46) (61 - 69)  BP: 132/76 (10-03-22 @ 04:46) (116/71 - 146/69)  RR: 18 (10-03-22 @ 04:46) (18 - 18)  SpO2: 97% (10-03-22 @ 04:46) (93% - 97%)  Wt(kg): --  I&O's Summary    02 Oct 2022 07:01  -  03 Oct 2022 07:00  --------------------------------------------------------  IN: 880 mL / OUT: 550 mL / NET: 330 mL    03 Oct 2022 07:01  -  03 Oct 2022 13:44  --------------------------------------------------------  IN: 360 mL / OUT: 0 mL / NET: 360 mL        Appearance: Normal	  Cardiovascular: Normal S1 S2,RRR, No JVD, No murmurs  Respiratory: Lungs clear to auscultation	  Gastrointestinal:  Soft, Non-tender, + BS	  Extremities: Normal range of motion, No clubbing, cyanosis or edema      Home Medications:  hydroCHLOROthiazide 25 mg oral tablet: 1 tab(s) orally , As Needed (01 Oct 2022 11:50)  melatonin 3 mg oral tablet: 1 tab(s) orally once a day (at bedtime) (01 Oct 2022 11:50)  Tresiba FlexTouch: 12 unit(s) subcutaneous once a day    Note:Pt gets from MD office (01 Oct 2022 11:49)  Victoza 18 mg/3 mL subcutaneous solution: 1.8 milligram(s) subcutaneous once a day (01 Oct 2022 11:50)      MEDICATIONS  (STANDING):  artificial  tears Solution 1 Drop(s) Both EYES three times a day  aspirin enteric coated 81 milliGRAM(s) Oral daily  atorvastatin 40 milliGRAM(s) Oral at bedtime  chlorhexidine 2% Cloths 1 Application(s) Topical daily  dextrose 5%. 1000 milliLiter(s) (50 mL/Hr) IV Continuous <Continuous>  dextrose 50% Injectable 25 Gram(s) IV Push once  gabapentin 200 milliGRAM(s) Oral at bedtime  glucagon  Injectable 1 milliGRAM(s) IntraMuscular once  heparin  Infusion 900 Unit(s)/Hr (8 mL/Hr) IV Continuous <Continuous>  influenza  Vaccine (HIGH DOSE) 0.7 milliLiter(s) IntraMuscular once  insulin lispro (ADMELOG) corrective regimen sliding scale   SubCutaneous three times a day before meals  levETIRAcetam 500 milliGRAM(s) Oral two times a day  metoprolol tartrate 25 milliGRAM(s) Oral two times a day  pantoprazole    Tablet 40 milliGRAM(s) Oral before breakfast  senna 2 Tablet(s) Oral at bedtime      TELEMETRY:  nsr 	    ECG:  	  RADIOLOGY:  < from: CT Head No Cont (10.01.22 @ 00:34) >  IMPRESSION:  Ventriculomegaly compared to the prior study, which likely reflects   partial resolution of previously seen mass effect secondary to   postoperative edema on 8/3/2022. Attention on follow-up is advised as   these findings can reflect some degree of communicating hydrocephalus   (i.e. normal pressure hydrocephalus).    Postsurgical changes of the right temporal lobe with interval decrease in   vasogenic edema in this region. 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    No acute intracranial hemorrhage or mass effect.    --- End of Report ---      < end of copied text >    DIAGNOSTIC TESTING:  [ ] Echocardiogram:  [ ]  Catheterization:  [ ] Stress Test:    OTHER: 	    LABS:	 	    Creatine Kinase, Serum: 110 U/L [25 - 170] (10-02 @ 09:51)  CKMB Units: 11.0 ng/mL [0.0 - 3.8] (10-02 @ 09:51)  Troponin T, High Sensitivity Result: 730 ng/L [0 - 51] (10-02 @ 09:51)  Troponin T, High Sensitivity Result: 430 ng/L [0 - 51] (10-01 @ 13:55)  CKMB Units: 25.9 ng/mL [0.0 - 3.8] (10-01 @ 13:55)  Creatine Kinase, Serum: 155 U/L [25 - 170] (10-01 @ 06:15)  CKMB Units: 21.7 ng/mL [0.0 - 3.8] (10-01 @ 06:15)  Troponin T, High Sensitivity Result: 221 ng/L [0 - 51] (10-01 @ 06:15)  Troponin T, High Sensitivity Result: 120 ng/L [0 - 51] (10-01 @ 02:17)  Troponin T, High Sensitivity Result: 64 ng/L [0 - 51] (09-30 @ 23:12)  Creatine Kinase, Serum: 77 U/L [25 - 170] (09-30 @ 23:12)  CKMB Units: 5.6 ng/mL [0.0 - 3.8] (09-30 @ 23:12)                          11.0   5.73  )-----------( 201      ( 03 Oct 2022 06:26 )             34.2     10-03    140  |  106  |  12  ----------------------------<  244<H>  3.6   |  21<L>  |  0.78    Ca    8.7      03 Oct 2022 06:26  Mg     1.8     10-03      PT/INR - ( 03 Oct 2022 06:26 )   PT: 12.2 sec;   INR: 1.06 ratio         PTT - ( 03 Oct 2022 06:26 )  PTT:57.9 sec

## 2022-10-03 NOTE — CONSULT NOTE ADULT - ASSESSMENT
ASSESSMENT:     82 year old gentlewoman, former smoker, without history of intrinsic lung disease. She has a history of HTN, HLD, DM, CAD s/p PCI (1990s and 2021) and PAD s/p RLE stenting. The patient was admitted in July with a marked change in personality (hyperactive, loquacious, etc) and ataxia. Outpatient cervical MRI incidentally noted a CNS lesion. Lumbar spine MRI performed due to chronic back pain revealed multilevel foraminal stenosis/disc bulges. CT head -> cystic versus necrotic masses in the right temporal lobe and in the medial left frontal lobe - moderate to large amount of vasogenic edema in the right frontal, parietal and temporal lobes - regional mass effect in the right cerebral hemisphere with partial effacement of the ventricular system and 5 mm subfalcine herniation of the right frontal lobe underneath the cerebral falx. She underwent right craniotomy for resection of tumor on July 27th. Post operative course was notable for an acute STEMI, atrial fibrillation, encephalopathy due to a UTI and a right middle lobe pulmonary embolism. She tolerated ASA and heparin gtt -> Eliquis without neurological complications. Cardiac catheterization was deferred as her recent craniotomy would limit anti-platelet therapy. She was discharged to [facility] where she was treated with Brivaracetam and depakote for seizure prophylaxis and continued on steroids. She has received radiation therapy and is awaiting initiation of immunotherapy in several weeks. The patient returned to the ER on 9/30 with chest discomfort described as severe "indigestion" and a headache. Pain improved somewhat with antacids. She had no shortness of breath or hypoxemia on room air. No cough, sputum production, hemoptysis, chest congestion or wheeze. No fevers, chills or sweats. She was found to have a NSTEMI with elevated cardiac markers but without significant EKG changes. She has been started on a heparin gtt in addition to ASA/statin/beta-blocker.     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/p right craniotomy and radiation therapy soon to be followed by immunotherapy - recent PET/CT is without evidence of distant metastatic disease -> FDG-avid left upper lobe lung mass abutting the mediastinum at level of AP window with associated atelectasis along the mediastinum corresponds to patient's known adenocarcinoma - right temporal craniotomy with photopenic low attenuation within the right temporal lobe is compatible with vasogenic edema, gliosis, and/or postsurgical changes - left vocal cord paralysis likely due to the left upper lobe lung mass encroaching on the recurrent laryngeal nerve    admitted with a NSTEMI    PLAN/RECOMMENDATIONS:    stable oxygenation on room air  spirometry    FEV1 - 1.81 liters - 88% predicted    FVC - 2.48 liters - 90% predicted    FEV1% - 73       c/w normal spirometry  observe off pulmonary medications and antibiotics  would continue lifelong A/C for paroxysmal atrial fibrillation 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 normal pressure hydrocephalus) - postsurgical changes of the right temporal lobe with interval decrease in vasogenic edema in this region - known cystic metastatic lesion in the high left frontal lobe is unchanged - no acute intracranial hemorrhage or mass effect.    off systemic steroids    on keppra for seizure prophylaxis (had been on brivaracetam)  glucose control  bowel regimen  physical therapy    Thank you for the courtesy of this referral. Plan of care discussed with the patient at bedside and with Dr. Garza.    Satish Hopkins MD, [facility]  [phone]  Pulmonary Medicine                             ASSESSMENT:     82 year old gentlewoman, former smoker, without history of intrinsic lung disease. She has a history of HTN, HLD, DM, CAD s/p PCI (1990s and 2021) and PAD s/p RLE stenting. The patient was admitted in July with a marked change in personality (hyperactive, loquacious, etc) and ataxia after an outpatient cervical MRI incidentally noted a CNS lesion. Lumbar spine MRI performed due to chronic back pain revealed multilevel foraminal stenosis/disc bulges. CT head -> cystic versus necrotic masses in the right temporal lobe and in the medial left frontal lobe - moderate to large amount of vasogenic edema in the right frontal, parietal and temporal lobes - regional mass effect in the right cerebral hemisphere with partial effacement of the ventricular system and 5 mm subfalcine herniation of the right frontal lobe underneath the cerebral falx. She underwent right craniotomy for resection of tumor on July 27th. Post operative course was notable for an acute STEMI, atrial fibrillation, encephalopathy due to a UTI and a right middle lobe pulmonary embolism. She tolerated ASA and heparin gtt -> Eliquis without neurological complications. Cardiac catheterization was deferred as her recent craniotomy would limit further anti-platelet therapy. She was discharged to [facility] where she was treated with Brivaracetam and depakote for seizure prophylaxis and tapered off steroids. She has received radiation therapy to the resection cavity of the right frontal lobe. She is scheduled to receive radiation to the left parafalcine lesoin. She is awaiting initiation of chemoimmunotherapy in several weeks pending improvement in functional status. The patient returned to the ER on 9/30 with chest discomfort described as severe "indigestion" and a headache. Pain improved somewhat with antacids. She had no shortness of breath or hypoxemia on room air. No cough, sputum production, hemoptysis, chest congestion or wheeze. No fevers, chills or sweats. She was found to have a NSTEMI with elevated cardiac markers but without significant EKG changes. She has been started on a heparin gtt in addition to ASA/statin/beta-blocker.     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/p right craniotomy and radiation therapy soon to be followed by immunotherapy - recent PET/CT is without evidence of distant metastatic disease -> FDG-avid left upper lobe lung mass abutting the mediastinum at level of AP window with associated atelectasis along the mediastinum corresponds to patient's known adenocarcinoma - right temporal craniotomy with photopenic low attenuation within the right temporal lobe is compatible with vasogenic edema, gliosis, and/or postsurgical changes - left vocal cord paralysis likely due to the left upper lobe lung mass encroaching on the recurrent laryngeal nerve    admitted with a NSTEMI    PLAN/RECOMMENDATIONS:    stable oxygenation on room air  spirometry    FEV1 - 1.81 liters - 88% predicted    FVC - 2.48 liters - 90% predicted    FEV1% - 73       c/w normal spirometry  observe off pulmonary medications and antibiotics  would continue lifelong A/C for paroxysmal atrial fibrillation 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    no cardiac catheterization to be pursued at this time  neurosurgery evaluation noted    head CT -> ventriculomegaly compared to the prior study, which likely reflects partial resolution of the previously seen mass effect secondary to postoperative edema on 8/3/2022 - attention on follow-up is advised as these findings can reflect some degree of communicating hydrocephalus ((i.e. normal pressure hydrocephalus) - postsurgical changes of the right temporal lobe with interval decrease in vasogenic edema in this region - known cystic metastatic lesion in the high left frontal lobe is unchanged - no acute intracranial hemorrhage or mass effect.    off systemic steroids    on keppra for seizure prophylaxis (had also been on brivaracetam)  glucose control  bowel regimen  physical therapy  outpatient radiation and medical oncology follow-up  outpatient ENT follow-up for possible intervention on the paralyzed vocal cord    Thank you for the courtesy of this referral. Plan of care discussed with the patient at bedside and with Dr. Andreia Hopkins MD, [facility]  [phone]  Pulmonary Medicine

## 2022-10-03 NOTE — CONSULT NOTE ADULT - SUBJECTIVE AND OBJECTIVE BOX
[facility] - [facility] - CONSULT NOTE    HPI: 82 year old gentlewoman, former smoker, without history of intrinsic lung disease. She has a history of HTN, HLD, DM, CAD s/p PCI (1990s and 2021) and PAD s/p RLE stenting. The patient was admitted in July with a marked change in personality (hyperactive, loquacious, etc) and ataxia. Outpatient cervical MRI incidentally noted a CNS lesion. Lumbar spine MRI performed due to chronic back pain revealed multilevel foraminal stenosis/disc bulges. CT head -> cystic versus necrotic masses in the right temporal lobe and in the medial left frontal lobe - moderate to large amount of vasogenic edema in the right frontal, parietal and temporal lobes - regional mass effect in the right cerebral hemisphere with partial effacement of the ventricular system and 5 mm subfalcine herniation of the right frontal lobe underneath the cerebral falx. She underwent right craniotomy for resection of tumor on July 27th. Post operative course was notable for an acute STEMI, atrial fibrillation, encephalopathy due to a UTI and a right middle lobe pulmonary embolism. She tolerated ASA and heparin gtt -> Eliquis without neurological complications. Cardiac catheterization was deferred as her recent craniotomy would limit anti-platelet therapy. She was discharged to [place] acute rehab where she was treated with Brivaracetam and depakote for seizure prophylaxis and continued on steroids. She has received radiation therapy and is awaiting initiation of immunotherapy in several weeks. The patient returned to the ER on 9/30 with chest discomfort described as severe "indigestion" and a headache. Pain improved somewhat with antacids. She had no shortness of breath or hypoxemia on room air. No cough, sputum production, hemoptysis, chest congestion or wheeze. No fevers, chills or sweats. She was found to have a NSTEMI with elevated cardiac markers but without significant EKG changes. She has been started on a heparin gtt in addition to ASA/statin/beta-blocker. Asked to evaluate.    PMHX:  Lung cancer metastatic to brain  Hypertension  Hyperlipidemia  Diabetes  Coronary artery disease  Myocardial infarction  Peripheral artery disease  Peripheral neuropathy  Chronic back pain    PSHX:  Right craniotomy  Bronchoscopy  RLE stenting  Percutaneous coronary intervention    FAMILY HISTORY:  Family history of stroke (Sibling)    SOCIAL HISTORY:  former smoker    Pulmonary Medications:       Antimicrobials:      Cardiology:  metoprolol tartrate 25 milliGRAM(s) Oral two times a day      Other:  artificial  tears Solution 1 Drop(s) Both EYES three times a day  aspirin enteric coated 81 milliGRAM(s) Oral daily  atorvastatin 40 milliGRAM(s) Oral at bedtime  chlorhexidine 2% Cloths 1 Application(s) Topical daily  dextrose 5%. 1000 milliLiter(s) IV Continuous <Continuous>  dextrose 50% Injectable 25 Gram(s) IV Push once  gabapentin 200 milliGRAM(s) Oral at bedtime  glucagon  Injectable 1 milliGRAM(s) IntraMuscular once  heparin  Infusion 900 Unit(s)/Hr IV Continuous <Continuous>  influenza  Vaccine (HIGH DOSE) 0.7 milliLiter(s) IntraMuscular once  insulin lispro (ADMELOG) corrective regimen sliding scale   SubCutaneous three times a day before meals  levETIRAcetam 500 milliGRAM(s) Oral two times a day  pantoprazole    Tablet 40 milliGRAM(s) Oral before breakfast  senna 2 Tablet(s) Oral at bedtime      Prn:  MEDICATIONS  (PRN):  acetaminophen     Tablet .. 650 milliGRAM(s) Oral every 6 hours PRN Temp greater or equal to 38C (100.4F), Mild Pain (1 - 3)  aluminum hydroxide/magnesium hydroxide/simethicone Suspension 30 milliLiter(s) Oral every 4 hours PRN Dyspepsia  dextrose Oral Gel 15 Gram(s) Oral once PRN Blood Glucose LESS THAN 70 milliGRAM(s)/deciliter  melatonin 3 milliGRAM(s) Oral at bedtime PRN Insomnia  ondansetron Injectable 4 milliGRAM(s) IV Push every 8 hours PRN Nausea and/or Vomiting      Allergies    adhesives (Pruritus)  No Known Drug Allergies      HOME MEDICATIONS: see  H & P    REVIEW OF SYSTEMS:  Constitutional: As per HPI  HEENT: Within normal limits  CV: As per HPI  Resp: As per HPI  GI: Within normal limits   : Within normal limits  Musculoskeletal: Within normal limits  Skin: Within normal limits  Neurological: ataxia  Psychiatric: Within normal limits  Endocrine: Within normal limits  Hematologic/Lymphatic: lung cancer metastatic to brain  Allergic/Immunologic: immunosuppressed on chronic steroids    [x] All other systems negative    OBJECTIVE:    POCT Blood Glucose.: 259 mg/dL (03 Oct 2022 07:48)  POCT Blood Glucose.: 208 mg/dL (02 Oct 2022 21:44)  POCT Blood Glucose.: 201 mg/dL (02 Oct 2022 16:48)  POCT Blood Glucose.: 290 mg/dL (02 Oct 2022 11:33)      PHYSICAL EXAM:  ICU Vital Signs Last 24 Hrs  T(C): 36.5 (03 Oct 2022 04:46), Max: 37 (02 Oct 2022 11:23)  T(F): 97.7 (03 Oct 2022 04:46), Max: 98.6 (02 Oct 2022 11:23)  HR: 68 (03 Oct 2022 04:46) (61 - 69)  BP: 132/76 (03 Oct 2022 04:46) (116/71 - 146/69)  BP(mean): --  ABP: --  ABP(mean): --  RR: 18 (03 Oct 2022 04:46) (18 - 18)  SpO2: 97% (03 Oct 2022 04:46) (93% - 97%) on room air    General: Awake. Alert. Cooperative. No distress. Appears stated age. Sitting up in bed  HEENT:  Atraumatic. Normocephalic. Anicteric. Normal oral mucosa. PERRL. EOMI. Partial. alopecia  Neck: Supple. Trachea midline. Thyroid without enlargement/tenderness/nodules. No carotid bruit. No JVD.	  Cardiovascular: Regular rate and rhythm. S1 S2 normal. No murmurs, rubs or gallops.  Respiratory: Respirations unlabored. Bilateral course breath sounds. No curvature.  Abdomen: Soft. Non-tender. Non-distended. No organomegaly. No masses. Normal bowel sounds.  Extremities: Warm to touch. No clubbing or cyanosis. No pedal edema.   Pulses: 2+ peripheral pulses all extremities.	  Skin: Healed craniotomy. Forehead ulcerations at the site of the craniotomy dressing now healed or crusted.  Lymph Nodes: Cervical, supraclavicular and axillary nodes normal  Neurological: Ataxia. upper extremity tremor. A and O x 3.  Psychiatry: Appropriate mood and affect.      LABS:                          11.0   5.73  )-----------( 201      ( 03 Oct 2022 06:26 )             34.2     CBC    WBC  5.73 <==, 7.60 <==, 6.22 <==, 8.98 <==    Hemoglobin  11.0 <<==, 11.3 <<==, 11.1 <<==, 12.4 <<==    Hematocrit  34.2 <==, 34.9 <==, 33.5 <==, 38.6 <==    Platelets  201 <==, 279 <==, 215 <==, 214 <==      140  |  106  |  12  ----------------------------<  244<H>    10-03  3.6   |  21<L>  |  0.78    LYTES    sodium  140 <==, 144 <==, 141 <==    potassium   3.6 <==, 4.0 <==, 3.4 <==, 4.0 <==    chloride  106 <==, 106 <==, 105 <==    carbon dioxide  21 <==, 21 <==, 16 <==    =============================================================================================  RENAL FUNCTION:    Creatinine:   0.78  <<==, 0.76  <<==, 0.74  <<==    BUN:   12 <==, 10 <==, 21 <==    ============================================================================================    calcium   8.7 <==, 8.4 <==, 8.7 <==    mag   1.8 <==, 1.4 <==, .9 <==    ============================================================================================  LFTs    AST:   16 <==     ALT:  13  <==     AP:  76  <=    Bili:  0.3  <=    PT/INR - ( 03 Oct 2022 06:26 )   PT: 12.2 sec;   INR: 1.06 ratio       PTT - ( 03 Oct 2022 06:26 )  PTT:57.9 sec      Serum Pro-Brain Natriuretic Peptide: 1664 pg/mL (09-30 @ 23:12)      CARDIAC MARKERS ( 02 Oct 2022 09:51 )   U/L /CKMB x     /CKMB Units 11.0 ng/mL    troponin 730 ng/L    CARDIAC MARKERS ( 01 Oct 2022 13:55 )  CPK x     /CKMB x     /CKMB Units 25.9 ng/mL    troponin 430 ng/L    CARDIAC MARKERS ( 01 Oct 2022 06:15 )   U/L /CKMB x     /CKMB Units 21.7 ng/mL    troponin 221 ng/L    CARDIAC MARKERS ( 01 Oct 2022 02:17 )  CPK x     /CKMB x     /CKMB Units x        troponin 120 ng/L    CARDIAC MARKERS ( 30 Sep 2022 23:12 )  CPK 77 U/L /CKMB x     /CKMB Units 5.6 ng/mL    troponin 64 ng/L    < from: Limited Transthoracic Echo (w/Cont) (08.08.22 @ 15:43) >    Patient name: LANIE BERTRAND  YOB: 1940   Age: 82 (F)   MR#: [id number]  Study Date: 8/8/2022  [id number]  ------------------------------------------------------------------------  Dimensions:    Normal Values:  LA:            2.0 - 4.0 cm  Ao:            2.0 - 3.8 cm  SEPTUM:        0.6 - 1.2 cm  PWT:   0.7    0.6 - 1.1 cm  LVIDd:  3.3    3.0 - 5.6 cm  LVIDs:  2.7    1.8 - 4.0 cm  Derived variables:  RWT: 0.46  Fractional short: 16 %  EF (Visual Estimate): 60-65 %  ------------------------------------------------------------------------  Conclusions:  1. Endocardial visualization enhanced with intravenous  injection of Ultrasonic Enhancing Agent (Lumason). Mild  segmental left ventricular systolic dysfunction. No left  ventricular thrombus. The basal  inferolateral wall, the  basal inferior wall, the basal anterolateral wall, and the  mid inferior wall are hypokinetic.  *** Compared with echocardiogram of 8/2/2022, the apical  akinesis in not seen in the current study.  ------------------------------------------------------------------------  Confirmed on  8/8/2022 - 19:12:19 by Pedro Luis Lorenzo M.D.  ------------------------------------------------------------------------  ---------------------------------------------------------------------------------------------------------------    MICROBIOLOGY:     COVID-19 PCR . (10.01.22 @ 02:36)   COVID-19 PCR: [facility]      RADIOLOGY:  [x ] Chest radiographs reviewed and interpreted by me    EXAM:  CT BRAIN                          PROCEDURE DATE:  10/01/2022      IMPRESSION:  Ventriculomegaly compared to the prior study, which likely reflects   partial resolution of previously seen mass effect secondary to   postoperative edema on 8/3/2022. Attention on follow-up is advised as   these findings can reflect some degree of communicating hydrocephalus   (i.e. normal pressure hydrocephalus).    Postsurgical changes of the right temporal lobe with interval decrease in   vasogenic edema in this region. 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    No acute intracranial hemorrhage or mass effect.    KATIE MEADE MD; Resident Radiologist  This documenthas been electronically signed.   KRISTOPHER DUNNE MD; Attending Radiologist  This document has been electronically signed. Oct  1 2022  1:39AM  ---------------------------------------------------------------------------------------------------------------  PETCT SKUL-THI ONC FDG INIT  - ORDERED BY: BURTON KILGORE    PROCEDURE DATE:  09/09/2022       OTHER STUDIES USED FOR CORRELATION: CTA of chest dated 8/3/2022 and CT   head dated 8/3/2022.    FINDINGS:    HEAD/NECK: Right temporal craniotomy again noted. Mild FDG activity in   overlying scalp is compatible with recent surgery. Photopenia in right   temporal lobe corresponding to area of hypoattenuation suggestive of   edema and/or encephalomalacia/postsurgical changes. Previously noted   lesion in left medial frontal lobe is not imaged. No focus of increased   FDG activity within the visualized portions of the brain.    Asymmetric FDG activity in the vocal cords, present on the right, and   absent on the left, compatible with left vocal cord paralysis. No   enlarged or FDG-avid cervical lymph node.    CHEST: No FDG-avid mediastinal or hilar lymph node.    LUNGS: FDG-avid left upper lobe mass abutting mediastinum at the level of   the AP window measures 2.7 x 2.1 cm, SUV 9.6 (image 71), unchanged as   compared to CT dated 8/3/2022. Atelectasis along the mediastinum, also is   unchanged.    PLEURA/PERICARDIUM: No abnormal FDG activity. No effusion.    HEPATOBILIARY/PANCREAS:  Physiologic FDG activity. Liver SUV mean is 3.0.    SPLEEN: Physiologic FDG activity. Normal in size.    ADRENAL GLANDS: No abnormal FDG activity. No nodule.    KIDNEYS/URINARY BLADDER: Physiologic excreted FDG activity.    REPRODUCTIVE ORGANS: No abnormal FDG activity.    ABDOMINOPELVIC NODES: No enlarged or FDG-avid lymph node.    BOWEL/PERITONEUM/MESENTERY: Diffuse small bowel and pancolonic   hypermetabolism, without corresponding abnormalities on CT, likely is   related to metformin.    BONES/SOFT TISSUES: No abnormal FDG activity. Right shoulder prosthesis.   Nonspecific, mildly increased radiotracer activity in right greater than   left glenohumeral joints.    IMPRESSION: Abnormal FDG-PET/CT scan.    1. FDG-avid left upper lobe lung mass abutting the mediastinum at level   of AP window, with associated atelectasis along the mediastinum,   unchanged as compared to CT dated 8/3/2022, corresponds to patient's   known adenocarcinoma.    2. Right temporal craniotomy with photopenic low attenuation within right   temporal lobe, compatible with vasogenic edema, gliosis, and/or   postsurgical changes, better evaluated on contrast-enhanced CT or MRI.    3. Findings suggestive of left vocal cord paralysis, likely secondary to   left upper lobe lung mass. Please correlate clinically and with direct   visualization, as indicated.    4. Nonspecific hypermetabolism in small bowel and colon likely is related   to metformin.    5. No scan evidence of metastatic disease.    JENNIFER BENEDICT MD; Attending Nuclear Medicine   This document has been electronically signed. Sep 11 2022  7:13AM  ---------------------------------------------------------------------------------------------------------------                 [facility] - [facility] - CONSULT NOTE    HPI: 82 year old gentlewoman, former smoker, without history of intrinsic lung disease. She has a history of HTN, HLD, DM, CAD s/p PCI (1990s and 2021) and PAD s/p RLE stenting. The patient was admitted in July with a marked change in personality (hyperactive, loquacious, etc) and ataxia after an outpatient cervical MRI incidentally noted a CNS lesion. Lumbar spine MRI performed due to chronic back pain revealed multilevel foraminal stenosis/disc bulges. CT head -> cystic versus necrotic masses in the right temporal lobe and in the medial left frontal lobe - moderate to large amount of vasogenic edema in the right frontal, parietal and temporal lobes - regional mass effect in the right cerebral hemisphere with partial effacement of the ventricular system and 5 mm subfalcine herniation of the right frontal lobe underneath the cerebral falx. She underwent right craniotomy for resection of tumor on July 27th. Post operative course was notable for an acute STEMI, atrial fibrillation, encephalopathy due to a UTI and a right middle lobe pulmonary embolism. She tolerated ASA and heparin gtt -> Eliquis without neurological complications. Cardiac catheterization was deferred as her recent craniotomy would limit further anti-platelet therapy. She was discharged to [place] acute rehab where she was treated with Brivaracetam and depakote for seizure prophylaxis and tapered off steroids. She has received radiation therapy to the resection cavity of the right frontal lobe. She is scheduled to receive radiation to the left parafalcine lesoin. She is awaiting initiation of chemoimmunotherapy in several weeks pending improvement in functional status. The patient returned to the ER on 9/30 with chest discomfort described as severe "indigestion" and a headache. Pain improved somewhat with antacids. She had no shortness of breath or hypoxemia on room air. No cough, sputum production, hemoptysis, chest congestion or wheeze. No fevers, chills or sweats. She was found to have a NSTEMI with elevated cardiac markers but without significant EKG changes. She has been started on a heparin gtt in addition to ASA/statin/beta-blocker.   Asked to evaluate.    PMHX:  Lung cancer metastatic to brain  Hypertension  Hyperlipidemia  Diabetes  Coronary artery disease  Myocardial infarction  Peripheral artery disease  Peripheral neuropathy  Chronic back pain    PSHX:  Right craniotomy  Bronchoscopy  RLE stenting  Percutaneous coronary intervention    FAMILY HISTORY:  Family history of stroke (Sibling)    SOCIAL HISTORY:  former smoker    Pulmonary Medications:       Antimicrobials:      Cardiology:  metoprolol tartrate 25 milliGRAM(s) Oral two times a day      Other:  artificial  tears Solution 1 Drop(s) Both EYES three times a day  aspirin enteric coated 81 milliGRAM(s) Oral daily  atorvastatin 40 milliGRAM(s) Oral at bedtime  chlorhexidine 2% Cloths 1 Application(s) Topical daily  dextrose 5%. 1000 milliLiter(s) IV Continuous <Continuous>  dextrose 50% Injectable 25 Gram(s) IV Push once  gabapentin 200 milliGRAM(s) Oral at bedtime  glucagon  Injectable 1 milliGRAM(s) IntraMuscular once  heparin  Infusion 900 Unit(s)/Hr IV Continuous <Continuous>  influenza  Vaccine (HIGH DOSE) 0.7 milliLiter(s) IntraMuscular once  insulin lispro (ADMELOG) corrective regimen sliding scale   SubCutaneous three times a day before meals  levETIRAcetam 500 milliGRAM(s) Oral two times a day  pantoprazole    Tablet 40 milliGRAM(s) Oral before breakfast  senna 2 Tablet(s) Oral at bedtime      Prn:  MEDICATIONS  (PRN):  acetaminophen     Tablet .. 650 milliGRAM(s) Oral every 6 hours PRN Temp greater or equal to 38C (100.4F), Mild Pain (1 - 3)  aluminum hydroxide/magnesium hydroxide/simethicone Suspension 30 milliLiter(s) Oral every 4 hours PRN Dyspepsia  dextrose Oral Gel 15 Gram(s) Oral once PRN Blood Glucose LESS THAN 70 milliGRAM(s)/deciliter  melatonin 3 milliGRAM(s) Oral at bedtime PRN Insomnia  ondansetron Injectable 4 milliGRAM(s) IV Push every 8 hours PRN Nausea and/or Vomiting      Allergies    adhesives (Pruritus)  No Known Drug Allergies      HOME MEDICATIONS: see  H & P    REVIEW OF SYSTEMS:  Constitutional: As per HPI  HEENT: Within normal limits  CV: As per HPI  Resp: As per HPI  GI: Within normal limits   : Within normal limits  Musculoskeletal: Within normal limits  Skin: Within normal limits  Neurological: ataxia  Psychiatric: Within normal limits  Endocrine: Within normal limits  Hematologic/Lymphatic: lung cancer metastatic to brain  Allergic/Immunologic: immunosuppressed on chronic steroids    [x] All other systems negative    OBJECTIVE:    POCT Blood Glucose.: 259 mg/dL (03 Oct 2022 07:48)  POCT Blood Glucose.: 208 mg/dL (02 Oct 2022 21:44)  POCT Blood Glucose.: 201 mg/dL (02 Oct 2022 16:48)  POCT Blood Glucose.: 290 mg/dL (02 Oct 2022 11:33)      PHYSICAL EXAM:  ICU Vital Signs Last 24 Hrs  T(C): 36.5 (03 Oct 2022 04:46), Max: 37 (02 Oct 2022 11:23)  T(F): 97.7 (03 Oct 2022 04:46), Max: 98.6 (02 Oct 2022 11:23)  HR: 68 (03 Oct 2022 04:46) (61 - 69)  BP: 132/76 (03 Oct 2022 04:46) (116/71 - 146/69)  BP(mean): --  ABP: --  ABP(mean): --  RR: 18 (03 Oct 2022 04:46) (18 - 18)  SpO2: 97% (03 Oct 2022 04:46) (93% - 97%) on room air    General: Awake. Alert. Cooperative. No distress. Appears stated age. Sitting up in bed  HEENT:  Atraumatic. Normocephalic. Anicteric. Normal oral mucosa. PERRL. EOMI. Partial. alopecia  Neck: Supple. Trachea midline. Thyroid without enlargement/tenderness/nodules. No carotid bruit. No JVD.	  Cardiovascular: Regular rate and rhythm. S1 S2 normal. No murmurs, rubs or gallops.  Respiratory: Respirations unlabored. Bilateral course breath sounds. No curvature.  Abdomen: Soft. Non-tender. Non-distended. No organomegaly. No masses. Normal bowel sounds.  Extremities: Warm to touch. No clubbing or cyanosis. No pedal edema.   Pulses: 2+ peripheral pulses all extremities.	  Skin: Healed craniotomy. Forehead ulcerations at the site of the craniotomy dressing now healed or crusted.  Lymph Nodes: Cervical, supraclavicular and axillary nodes normal  Neurological: Ataxia. upper extremity tremor. A and O x 3.  Psychiatry: Appropriate mood and affect.      LABS:                          11.0   5.73  )-----------( 201      ( 03 Oct 2022 06:26 )             34.2     CBC    WBC  5.73 <==, 7.60 <==, 6.22 <==, 8.98 <==    Hemoglobin  11.0 <<==, 11.3 <<==, 11.1 <<==, 12.4 <<==    Hematocrit  34.2 <==, 34.9 <==, 33.5 <==, 38.6 <==    Platelets  201 <==, 279 <==, 215 <==, 214 <==      140  |  106  |  12  ----------------------------<  244<H>    10-03  3.6   |  21<L>  |  0.78    LYTES    sodium  140 <==, 144 <==, 141 <==    potassium   3.6 <==, 4.0 <==, 3.4 <==, 4.0 <==    chloride  106 <==, 106 <==, 105 <==    carbon dioxide  21 <==, 21 <==, 16 <==    =============================================================================================  RENAL FUNCTION:    Creatinine:   0.78  <<==, 0.76  <<==, 0.74  <<==    BUN:   12 <==, 10 <==, 21 <==    ============================================================================================    calcium   8.7 <==, 8.4 <==, 8.7 <==    mag   1.8 <==, 1.4 <==, .9 <==    ============================================================================================  LFTs    AST:   16 <==     ALT:  13  <==     AP:  76  <=    Bili:  0.3  <=    PT/INR - ( 03 Oct 2022 06:26 )   PT: 12.2 sec;   INR: 1.06 ratio       PTT - ( 03 Oct 2022 06:26 )  PTT:57.9 sec      Serum Pro-Brain Natriuretic Peptide: 1664 pg/mL (09-30 @ 23:12)      CARDIAC MARKERS ( 02 Oct 2022 09:51 )   U/L /CKMB x     /CKMB Units 11.0 ng/mL    troponin 730 ng/L    CARDIAC MARKERS ( 01 Oct 2022 13:55 )  CPK x     /CKMB x     /CKMB Units 25.9 ng/mL    troponin 430 ng/L    CARDIAC MARKERS ( 01 Oct 2022 06:15 )   U/L /CKMB x     /CKMB Units 21.7 ng/mL    troponin 221 ng/L    CARDIAC MARKERS ( 01 Oct 2022 02:17 )  CPK x     /CKMB x     /CKMB Units x        troponin 120 ng/L    CARDIAC MARKERS ( 30 Sep 2022 23:12 )  CPK 77 U/L /CKMB x     /CKMB Units 5.6 ng/mL    troponin 64 ng/L    < from: Limited Transthoracic Echo (w/Cont) (08.08.22 @ 15:43) >    Patient name: LANIE BERTRAND  YOB: 1940   Age: 82 (F)   MR#: [id number]  Study Date: 8/8/2022  Location: [address] [place], [place] [id number]: [facility]  ------------------------------------------------------------------------  Dimensions:    Normal Values:  LA:            2.0 - 4.0 cm  Ao:            2.0 - 3.8 cm  SEPTUM:        0.6 - 1.2 cm  PWT:   0.7    0.6 - 1.1 cm  LVIDd:  3.3    3.0 - 5.6 cm  LVIDs:  2.7    1.8 - 4.0 cm  Derived variables:  RWT: 0.46  Fractional short: 16 %  EF (Visual Estimate): 60-65 %  ------------------------------------------------------------------------  Conclusions:  1. Endocardial visualization enhanced with intravenous  injection of Ultrasonic Enhancing Agent (Lumason). Mild  segmental left ventricular systolic dysfunction. No left  ventricular thrombus. The basal  inferolateral wall, the  basal inferior wall, the basal anterolateral wall, and the  mid inferior wall are hypokinetic.  *** Compared with echocardiogram of 8/2/2022, the apical  akinesis in not seen in the current study.  ------------------------------------------------------------------------  Confirmed on  8/8/2022 - 19:12:19 by Pedro Luis Lorenzo M.D.  ------------------------------------------------------------------------  ---------------------------------------------------------------------------------------------------------------    MICROBIOLOGY:     COVID-19 PCR . (10.01.22 @ 02:36)   COVID-19 PCR: [facility]      RADIOLOGY:  [x ] Chest radiographs reviewed and interpreted by me    EXAM:  CT BRAIN                          PROCEDURE DATE:  10/01/2022      IMPRESSION:  Ventriculomegaly compared to the prior study, which likely reflects   partial resolution of previously seen mass effect secondary to   postoperative edema on 8/3/2022. Attention on follow-up is advised as   these findings can reflect some degree of communicating hydrocephalus   (i.e. normal pressure hydrocephalus).    Postsurgical changes of the right temporal lobe with interval decrease in   vasogenic edema in this region. 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    No acute intracranial hemorrhage or mass effect.    KATIE MEADE MD; Resident Radiologist  This documenthas been electronically signed.   KRISTOPHER DUNNE MD; Attending Radiologist  This document has been electronically signed. Oct  1 2022  1:39AM  ---------------------------------------------------------------------------------------------------------------  [facility] ONC FDG INIT  - ORDERED BY: BURTON KILGORE    PROCEDURE DATE:  09/09/2022       OTHER STUDIES USED FOR CORRELATION: CTA of chest dated 8/3/2022 and CT   head dated 8/3/2022.    FINDINGS:    HEAD/NECK: Right temporal craniotomy again noted. Mild FDG activity in   overlying scalp is compatible with recent surgery. Photopenia in right   temporal lobe corresponding to area of hypoattenuation suggestive of   edema and/or encephalomalacia/postsurgical changes. Previously noted   lesion in left medial frontal lobe is not imaged. No focus of increased   FDG activity within the visualized portions of the brain.    Asymmetric FDG activity in the vocal cords, present on the right, and   absent on the left, compatible with left vocal cord paralysis. No   enlarged or FDG-avid cervical lymph node.    CHEST: No FDG-avid mediastinal or hilar lymph node.    LUNGS: FDG-avid left upper lobe mass abutting mediastinum at the level of   the AP window measures 2.7 x 2.1 cm, SUV 9.6 (image 71), unchanged as   compared to CT dated 8/3/2022. Atelectasis along the mediastinum, also is   unchanged.    PLEURA/PERICARDIUM: No abnormal FDG activity. No effusion.    HEPATOBILIARY/PANCREAS:  Physiologic FDG activity. Liver SUV mean is 3.0.    SPLEEN: Physiologic FDG activity. Normal in size.    ADRENAL GLANDS: No abnormal FDG activity. No nodule.    KIDNEYS/URINARY BLADDER: Physiologic excreted FDG activity.    REPRODUCTIVE ORGANS: No abnormal FDG activity.    ABDOMINOPELVIC NODES: No enlarged or FDG-avid lymph node.    BOWEL/PERITONEUM/MESENTERY: Diffuse small bowel and pancolonic   hypermetabolism, without corresponding abnormalities on CT, likely is   related to metformin.    BONES/SOFT TISSUES: No abnormal FDG activity. Right shoulder prosthesis.   Nonspecific, mildly increased radiotracer activity in right greater than   left glenohumeral joints.    IMPRESSION: Abnormal FDG-PET/CT scan.    1. FDG-avid left upper lobe lung mass abutting the mediastinum at level   of AP window, with associated atelectasis along the mediastinum,   unchanged as compared to CT dated 8/3/2022, corresponds to patient's   known adenocarcinoma.    2. Right temporal craniotomy with photopenic low attenuation within right   temporal lobe, compatible with vasogenic edema, gliosis, and/or   postsurgical changes, better evaluated on contrast-enhanced CT or MRI.    3. Findings suggestive of left vocal cord paralysis, likely secondary to   left upper lobe lung mass. Please correlate clinically and with direct   visualization, as indicated.    4. Nonspecific hypermetabolism in small bowel and colon likely is related   to metformin.    5. No scan evidence of metastatic disease.    JENNIFER BENEDICT MD; Attending Nuclear Medicine   This document has been electronically signed. Sep 11 2022  7:13AM  ---------------------------------------------------------------------------------------------------------------

## 2022-10-03 NOTE — PROGRESS NOTE ADULT - SUBJECTIVE AND OBJECTIVE BOX
DATE OF SERVICE: 10-03-22 @ 11:01    Patient is a 82y old  Female who presents with a chief complaint of chest pain (02 Oct 2022 13:17)      SUBJECTIVE / OVERNIGHT EVENTS:  No chest pain. No shortness of breath. No complaints. No events overnight.     MEDICATIONS  (STANDING):  artificial  tears Solution 1 Drop(s) Both EYES three times a day  aspirin enteric coated 81 milliGRAM(s) Oral daily  atorvastatin 40 milliGRAM(s) Oral at bedtime  chlorhexidine 2% Cloths 1 Application(s) Topical daily  dextrose 5%. 1000 milliLiter(s) (50 mL/Hr) IV Continuous <Continuous>  dextrose 50% Injectable 25 Gram(s) IV Push once  gabapentin 200 milliGRAM(s) Oral at bedtime  glucagon  Injectable 1 milliGRAM(s) IntraMuscular once  heparin  Infusion 900 Unit(s)/Hr (8 mL/Hr) IV Continuous <Continuous>  influenza  Vaccine (HIGH DOSE) 0.7 milliLiter(s) IntraMuscular once  insulin lispro (ADMELOG) corrective regimen sliding scale   SubCutaneous three times a day before meals  levETIRAcetam 500 milliGRAM(s) Oral two times a day  metoprolol tartrate 25 milliGRAM(s) Oral two times a day  pantoprazole    Tablet 40 milliGRAM(s) Oral before breakfast  senna 2 Tablet(s) Oral at bedtime    MEDICATIONS  (PRN):  acetaminophen     Tablet .. 650 milliGRAM(s) Oral every 6 hours PRN Temp greater or equal to 38C (100.4F), Mild Pain (1 - 3)  aluminum hydroxide/magnesium hydroxide/simethicone Suspension 30 milliLiter(s) Oral every 4 hours PRN Dyspepsia  dextrose Oral Gel 15 Gram(s) Oral once PRN Blood Glucose LESS THAN 70 milliGRAM(s)/deciliter  melatonin 3 milliGRAM(s) Oral at bedtime PRN Insomnia  ondansetron Injectable 4 milliGRAM(s) IV Push every 8 hours PRN Nausea and/or Vomiting      Vital Signs Last 24 Hrs  T(C): 36.5 (03 Oct 2022 04:46), Max: 37 (02 Oct 2022 11:23)  T(F): 97.7 (03 Oct 2022 04:46), Max: 98.6 (02 Oct 2022 11:23)  HR: 68 (03 Oct 2022 04:46) (61 - 69)  BP: 132/76 (03 Oct 2022 04:46) (116/71 - 146/69)  BP(mean): --  RR: 18 (03 Oct 2022 04:46) (18 - 18)  SpO2: 97% (03 Oct 2022 04:46) (93% - 97%)    Parameters below as of 03 Oct 2022 04:46  Patient On (Oxygen Delivery Method): room air      CAPILLARY BLOOD GLUCOSE      POCT Blood Glucose.: 259 mg/dL (03 Oct 2022 07:48)  POCT Blood Glucose.: 208 mg/dL (02 Oct 2022 21:44)  POCT Blood Glucose.: 201 mg/dL (02 Oct 2022 16:48)  POCT Blood Glucose.: 290 mg/dL (02 Oct 2022 11:33)    I&O's Summary    02 Oct 2022 07:01  -  03 Oct 2022 07:00  --------------------------------------------------------  IN: 880 mL / OUT: 550 mL / NET: 330 mL    03 Oct 2022 07:01  -  03 Oct 2022 11:01  --------------------------------------------------------  IN: 360 mL / OUT: 0 mL / NET: 360 mL        PHYSICAL EXAM:  GENERAL: NAD, well-developed  HEAD:  Atraumatic, Normocephalic  EYES: EOMI, PERRLA, conjunctiva and sclera clear  NECK: Supple, No JVD  CHEST/LUNG: Clear to auscultation bilaterally; No wheeze  HEART: Regular rate and rhythm; No murmurs, rubs, or gallops  ABDOMEN: Soft, Nontender, Nondistended; Bowel sounds present  EXTREMITIES:  2+ Peripheral Pulses, No clubbing, cyanosis, or edema  PSYCH: AAOx3  NEUROLOGY: non-focal  SKIN: No rashes or lesions    LABS:                        11.0   5.73  )-----------( 201      ( 03 Oct 2022 06:26 )             34.2     10-03    140  |  106  |  12  ----------------------------<  244<H>  3.6   |  21<L>  |  0.78    Ca    8.7      03 Oct 2022 06:26  Mg     1.8     10-03      PT/INR - ( 03 Oct 2022 06:26 )   PT: 12.2 sec;   INR: 1.06 ratio         PTT - ( 03 Oct 2022 06:26 )  PTT:57.9 sec  CARDIAC MARKERS ( 02 Oct 2022 09:51 )  x     / x     / 110 U/L / x     / 11.0 ng/mL  CARDIAC MARKERS ( 01 Oct 2022 13:55 )  x     / x     / x     / x     / 25.9 ng/mL          RADIOLOGY & ADDITIONAL TESTS:    Imaging Personally Reviewed:    Consultant(s) Notes Reviewed:      Care Discussed with Consultants/Other Providers:

## 2022-10-03 NOTE — GOALS OF CARE CONVERSATION - ADVANCED CARE PLANNING - CONVERSATION DETAILS
Discussed with patient and Dick current hospital findings, current diagnosis, treatment.  Patient remains FULL CODE  await start of poss chemo/immunotherapy next week     Regarding a/c for PAF, PE d/w them r/b/a of cont ora l a/c in light of recent cranio/met brain dz  Neuro sx states no absolute CI to a/c  Son and patient will further discuss a/c and let me later tonight

## 2022-10-04 LAB
APTT BLD: 44.6 SEC — HIGH (ref 27.5–35.5)
APTT BLD: 77.2 SEC — HIGH (ref 27.5–35.5)
GLUCOSE BLDC GLUCOMTR-MCNC: 183 MG/DL — HIGH (ref 70–99)
GLUCOSE BLDC GLUCOMTR-MCNC: 216 MG/DL — HIGH (ref 70–99)
GLUCOSE BLDC GLUCOMTR-MCNC: 255 MG/DL — HIGH (ref 70–99)
GLUCOSE BLDC GLUCOMTR-MCNC: 346 MG/DL — HIGH (ref 70–99)
HCT VFR BLD CALC: 29 % — LOW (ref 34.5–45)
HGB BLD-MCNC: 9.5 G/DL — LOW (ref 11.5–15.5)
MCHC RBC-ENTMCNC: 31.4 PG — SIGNIFICANT CHANGE UP (ref 27–34)
MCHC RBC-ENTMCNC: 32.8 GM/DL — SIGNIFICANT CHANGE UP (ref 32–36)
MCV RBC AUTO: 95.7 FL — SIGNIFICANT CHANGE UP (ref 80–100)
NRBC # BLD: 0 /100 WBCS — SIGNIFICANT CHANGE UP (ref 0–0)
PLATELET # BLD AUTO: 211 K/UL — SIGNIFICANT CHANGE UP (ref 150–400)
RBC # BLD: 3.03 M/UL — LOW (ref 3.8–5.2)
RBC # FLD: 14.3 % — SIGNIFICANT CHANGE UP (ref 10.3–14.5)
WBC # BLD: 5.83 K/UL — SIGNIFICANT CHANGE UP (ref 3.8–10.5)
WBC # FLD AUTO: 5.83 K/UL — SIGNIFICANT CHANGE UP (ref 3.8–10.5)

## 2022-10-04 PROCEDURE — 99222 1ST HOSP IP/OBS MODERATE 55: CPT | Mod: GC

## 2022-10-04 RX ORDER — SODIUM CHLORIDE 9 MG/ML
1000 INJECTION, SOLUTION INTRAVENOUS
Refills: 0 | Status: DISCONTINUED | OUTPATIENT
Start: 2022-10-04 | End: 2022-10-05

## 2022-10-04 RX ORDER — GLUCAGON INJECTION, SOLUTION 0.5 MG/.1ML
1 INJECTION, SOLUTION SUBCUTANEOUS ONCE
Refills: 0 | Status: DISCONTINUED | OUTPATIENT
Start: 2022-10-04 | End: 2022-10-05

## 2022-10-04 RX ORDER — HEPARIN SODIUM 5000 [USP'U]/ML
750 INJECTION INTRAVENOUS; SUBCUTANEOUS
Qty: 25000 | Refills: 0 | Status: DISCONTINUED | OUTPATIENT
Start: 2022-10-04 | End: 2022-10-04

## 2022-10-04 RX ORDER — APIXABAN 2.5 MG/1
2.5 TABLET, FILM COATED ORAL
Refills: 0 | Status: DISCONTINUED | OUTPATIENT
Start: 2022-10-04 | End: 2022-10-05

## 2022-10-04 RX ORDER — INSULIN LISPRO 100/ML
VIAL (ML) SUBCUTANEOUS AT BEDTIME
Refills: 0 | Status: DISCONTINUED | OUTPATIENT
Start: 2022-10-04 | End: 2022-10-05

## 2022-10-04 RX ORDER — DEXTROSE 50 % IN WATER 50 %
25 SYRINGE (ML) INTRAVENOUS ONCE
Refills: 0 | Status: DISCONTINUED | OUTPATIENT
Start: 2022-10-04 | End: 2022-10-05

## 2022-10-04 RX ORDER — POLYETHYLENE GLYCOL 3350 17 G/17G
17 POWDER, FOR SOLUTION ORAL DAILY
Refills: 0 | Status: DISCONTINUED | OUTPATIENT
Start: 2022-10-04 | End: 2022-10-05

## 2022-10-04 RX ORDER — INSULIN GLARGINE 100 [IU]/ML
5 INJECTION, SOLUTION SUBCUTANEOUS AT BEDTIME
Refills: 0 | Status: DISCONTINUED | OUTPATIENT
Start: 2022-10-04 | End: 2022-10-05

## 2022-10-04 RX ORDER — DEXTROSE 50 % IN WATER 50 %
12.5 SYRINGE (ML) INTRAVENOUS ONCE
Refills: 0 | Status: DISCONTINUED | OUTPATIENT
Start: 2022-10-04 | End: 2022-10-05

## 2022-10-04 RX ORDER — DEXTROSE 50 % IN WATER 50 %
15 SYRINGE (ML) INTRAVENOUS ONCE
Refills: 0 | Status: DISCONTINUED | OUTPATIENT
Start: 2022-10-04 | End: 2022-10-05

## 2022-10-04 RX ORDER — HEPARIN SODIUM 5000 [USP'U]/ML
700 INJECTION INTRAVENOUS; SUBCUTANEOUS
Qty: 25000 | Refills: 0 | Status: DISCONTINUED | OUTPATIENT
Start: 2022-10-04 | End: 2022-10-04

## 2022-10-04 RX ORDER — INSULIN LISPRO 100/ML
VIAL (ML) SUBCUTANEOUS
Refills: 0 | Status: DISCONTINUED | OUTPATIENT
Start: 2022-10-04 | End: 2022-10-05

## 2022-10-04 RX ADMIN — Medication 4: at 12:20

## 2022-10-04 RX ADMIN — LEVETIRACETAM 500 MILLIGRAM(S): 250 TABLET, FILM COATED ORAL at 17:34

## 2022-10-04 RX ADMIN — Medication 1 DROP(S): at 22:00

## 2022-10-04 RX ADMIN — HEPARIN SODIUM 7.5 UNIT(S)/HR: 5000 INJECTION INTRAVENOUS; SUBCUTANEOUS at 16:38

## 2022-10-04 RX ADMIN — HEPARIN SODIUM 7 UNIT(S)/HR: 5000 INJECTION INTRAVENOUS; SUBCUTANEOUS at 08:29

## 2022-10-04 RX ADMIN — Medication 25 MILLIGRAM(S): at 17:34

## 2022-10-04 RX ADMIN — CHLORHEXIDINE GLUCONATE 1 APPLICATION(S): 213 SOLUTION TOPICAL at 11:27

## 2022-10-04 RX ADMIN — PANTOPRAZOLE SODIUM 40 MILLIGRAM(S): 20 TABLET, DELAYED RELEASE ORAL at 06:07

## 2022-10-04 RX ADMIN — LEVETIRACETAM 500 MILLIGRAM(S): 250 TABLET, FILM COATED ORAL at 05:25

## 2022-10-04 RX ADMIN — Medication 1 DROP(S): at 13:04

## 2022-10-04 RX ADMIN — GABAPENTIN 200 MILLIGRAM(S): 400 CAPSULE ORAL at 21:53

## 2022-10-04 RX ADMIN — Medication 3: at 08:25

## 2022-10-04 RX ADMIN — APIXABAN 2.5 MILLIGRAM(S): 2.5 TABLET, FILM COATED ORAL at 18:13

## 2022-10-04 RX ADMIN — Medication 25 MILLIGRAM(S): at 05:25

## 2022-10-04 RX ADMIN — Medication 2: at 17:01

## 2022-10-04 RX ADMIN — ATORVASTATIN CALCIUM 40 MILLIGRAM(S): 80 TABLET, FILM COATED ORAL at 21:53

## 2022-10-04 RX ADMIN — INSULIN GLARGINE 5 UNIT(S): 100 INJECTION, SOLUTION SUBCUTANEOUS at 22:30

## 2022-10-04 RX ADMIN — Medication 81 MILLIGRAM(S): at 11:27

## 2022-10-04 RX ADMIN — Medication 3 MILLIGRAM(S): at 21:53

## 2022-10-04 RX ADMIN — POLYETHYLENE GLYCOL 3350 17 GRAM(S): 17 POWDER, FOR SOLUTION ORAL at 11:27

## 2022-10-04 RX ADMIN — Medication 1 DROP(S): at 05:26

## 2022-10-04 NOTE — PROGRESS NOTE ADULT - SUBJECTIVE AND OBJECTIVE BOX
DATE OF SERVICE: 10-04-22 @ 11:05    Patient is a 82y old  Female who presents with a chief complaint of chest pain (03 Oct 2022 13:44)      SUBJECTIVE / OVERNIGHT EVENTS:  No chest pain. No shortness of breath. No complaints. No events overnight.     MEDICATIONS  (STANDING):  artificial  tears Solution 1 Drop(s) Both EYES three times a day  aspirin enteric coated 81 milliGRAM(s) Oral daily  atorvastatin 40 milliGRAM(s) Oral at bedtime  chlorhexidine 2% Cloths 1 Application(s) Topical daily  dextrose 5%. 1000 milliLiter(s) (50 mL/Hr) IV Continuous <Continuous>  dextrose 50% Injectable 25 Gram(s) IV Push once  gabapentin 200 milliGRAM(s) Oral at bedtime  glucagon  Injectable 1 milliGRAM(s) IntraMuscular once  heparin  Infusion 700 Unit(s)/Hr (7 mL/Hr) IV Continuous <Continuous>  influenza  Vaccine (HIGH DOSE) 0.7 milliLiter(s) IntraMuscular once  insulin lispro (ADMELOG) corrective regimen sliding scale   SubCutaneous three times a day before meals  levETIRAcetam 500 milliGRAM(s) Oral two times a day  metoprolol tartrate 25 milliGRAM(s) Oral two times a day  pantoprazole    Tablet 40 milliGRAM(s) Oral before breakfast  senna 2 Tablet(s) Oral at bedtime    MEDICATIONS  (PRN):  acetaminophen     Tablet .. 650 milliGRAM(s) Oral every 6 hours PRN Temp greater or equal to 38C (100.4F), Mild Pain (1 - 3)  aluminum hydroxide/magnesium hydroxide/simethicone Suspension 30 milliLiter(s) Oral every 4 hours PRN Dyspepsia  dextrose Oral Gel 15 Gram(s) Oral once PRN Blood Glucose LESS THAN 70 milliGRAM(s)/deciliter  melatonin 3 milliGRAM(s) Oral at bedtime PRN Insomnia  ondansetron Injectable 4 milliGRAM(s) IV Push every 8 hours PRN Nausea and/or Vomiting      Vital Signs Last 24 Hrs  T(C): 36.6 (04 Oct 2022 00:02), Max: 36.6 (04 Oct 2022 00:02)  T(F): 97.9 (04 Oct 2022 00:02), Max: 97.9 (04 Oct 2022 00:02)  HR: 65 (04 Oct 2022 00:02) (64 - 66)  BP: 118/72 (04 Oct 2022 00:02) (110/69 - 118/72)  BP(mean): --  RR: 18 (04 Oct 2022 00:02) (18 - 18)  SpO2: 94% (04 Oct 2022 00:02) (94% - 100%)    Parameters below as of 04 Oct 2022 00:02  Patient On (Oxygen Delivery Method): room air      CAPILLARY BLOOD GLUCOSE      POCT Blood Glucose.: 255 mg/dL (04 Oct 2022 08:05)  POCT Blood Glucose.: 253 mg/dL (03 Oct 2022 21:50)  POCT Blood Glucose.: 238 mg/dL (03 Oct 2022 16:32)  POCT Blood Glucose.: 200 mg/dL (03 Oct 2022 11:33)    I&O's Summary    03 Oct 2022 07:01  -  04 Oct 2022 07:00  --------------------------------------------------------  IN: 1140 mL / OUT: 0 mL / NET: 1140 mL    04 Oct 2022 07:01  -  04 Oct 2022 11:05  --------------------------------------------------------  IN: 360 mL / OUT: 0 mL / NET: 360 mL        PHYSICAL EXAM:  GENERAL: NAD, well-developed  HEAD:  Atraumatic, Normocephalic  EYES: EOMI, PERRLA, conjunctiva and sclera clear  NECK: Supple, No JVD  CHEST/LUNG: Clear to auscultation bilaterally; No wheeze  HEART: Regular rate and rhythm; No murmurs, rubs, or gallops  ABDOMEN: Soft, Nontender, Nondistended; Bowel sounds present  EXTREMITIES:  2+ Peripheral Pulses, No clubbing, cyanosis, or edema  PSYCH: AAOx3  NEUROLOGY: non-focal  SKIN: No rashes or lesions    LABS:                        9.5    5.83  )-----------( 211      ( 04 Oct 2022 06:32 )             29.0     10-03    140  |  106  |  12  ----------------------------<  244<H>  3.6   |  21<L>  |  0.78    Ca    8.7      03 Oct 2022 06:26  Mg     1.8     10-03      PT/INR - ( 03 Oct 2022 06:26 )   PT: 12.2 sec;   INR: 1.06 ratio         PTT - ( 04 Oct 2022 06:32 )  PTT:77.2 sec  CARDIAC MARKERS ( 03 Oct 2022 13:49 )  x     / x     / 46 U/L / x     / 2.6 ng/mL    Troponin T, High Sensitivity Result: 697: Specimen not hemolyzed   *   *   Rapid upward or downward changes in high-sensitivity troponin levels   suggest acute myocardial injury. Renal impairment may cause sustained   troponin elevations.   Normal: <6 - 14 ng/L   Indeterminate: 15-51 ng/L   Elevated: > 51 ng/L   See [email or web address] on the [facility] [employer] for more   information ng/L (10.03.22 @ 13:49)       Historical Values  Troponin T, High Sensitivity Result: 697: Specimen not hemolyzed   *   *   Rapid upward or downward changes in high-sensitivity troponin levels   suggest acute myocardial injury. Renal impairment may cause sustained   troponin elevations.   Normal: <6 - 14 ng/L   Indeterminate: 15-51 ng/L   Elevated: > 51 ng/L   See [email or web address] on the [facility] for more   information ng/L (10.03.22 @ 13:49)   Troponin T, High Sensitivity Result: 730: Specimen not hemolyzed   *   *   Rapid upward or downward changes in high-sensitivity troponin levels   suggest acute myocardial injury. Renal impairment may cause sustained   troponin elevations.   Normal: <6 - 14 ng/L   Indeterminate: 15-51 ng/L   Elevated: > 51 ng/L   See [email or web address] on the [employer] for more   information ng/L (10.02.22 @ 09:51)   Troponin T, High Sensitivity Result: 430: Specimen not hemolyzed   *       RADIOLOGY & ADDITIONAL TESTS:    Imaging Personally Reviewed:    Consultant(s) Notes Reviewed:      Care Discussed with Consultants/Other Providers:

## 2022-10-04 NOTE — CONSULT NOTE ADULT - ASSESSMENT
82F h/o HTN, T2DM, CAD, MI, PAD, peripheral neuropathy, former smoker, recent d/o NSCLC with adenocarcinoma histology that is stage IV or metastatic (ANTHONY tumor with brain mets, treatment naive and due to start as outpt with Dr. Ramon), presenting from home for CP:     #NSTEMI, PE, afib  -risks vs benefits discussion held with patient/family during beginning of admission. neurosx recs appreciated. patient/family in agreement to proceeding with therapeutic a/c (hep gtt with low therapeutic PTT goal) to treat NSTEMI, as well as, afib and prior PE  -on hep gtt. c/w management per cardiology. CKMB trending down     #PE   -currently on hep gtt. can use either DOAC or lovenox for PE from heme/onc standpoint     #metastatic lung adenocarcinoma:   -was undergoing orthopedic evaluation as an outpatient due to ongoing chronic back pain and gait imbalance.   -She ultimately presented to [facility] ED on 7/19/2022. Brain imaging demonstrated evidence of metastatic disease including a dominant right temporal lobe 3.1 cm enhancing mass with vasogenic edema.   -CT scan of her body demonstrated a ANTHONY tumor with subcentimeter pulmonary nodules and a nonspecific adrenal nodule.   -Bronchoscopy with biopsy performed on 7/20/2022 demonstrated adenocarcinoma consistent with lung primary; tumor tested PD-L1 negative and was negative for actionable mutations.  - She underwent craniotomy resection of the dominant brain mass on 7/27/2022 with pathology revealing the same findings.  - Hospital course was complicated by NSTEMI, A. fib with RVR and RML PE. She was put on medications for seizure prophylaxis. She was put on anticoagulation and was started on prophylactic dosing of anticoagulation with enoxaparin 40 mg subcu daily, pending clearance for therapeutic anticoagulation by neurosurgery. She was subsequently discharged to acute rehab on 8/10–8/26/2022.  -She was titrated off steroids while at acute Rehab. at home reported to have limited functional status. She has 24-hour home care.   -PET-CT on 9/9/22: 1. FDG-avid left upper lobe lung mass abutting the mediastinum at level of AP window, with associated atelectasis along the mediastinum, unchanged as compared to CT dated 8/3/2022, corresponds to patient's known adenocarcinoma. 2. Right temporal craniotomy with photopenic low attenuation within right  temporal lobe, compatible with vasogenic edema, gliosis, and/or postsurgical changes, better evaluated on contrast-enhanced CT or MRI. 3. Findings suggestive of left vocal cord paralysis, likely secondary to  left upper lobe lung mass. Please correlate clinically and with direct visualization, as indicated. 4. Nonspecific hypermetabolism in small bowel and colon likely is related to metformin. 5. No scan evidence of metastatic disease.  -evaluated by rad onc (Dr. Funes), planned to stat  GK SRS, 27Gy in 3 fractions to resection cavity of R temporal lobe and planned to irradiate the L parafalcine lesion.  -pending improvement in functional status, patient was tentatively planned to be treated with chemoimmunotherapy as outpt   -no plan for inpatient chemoimmunotherapy and she can f/u with Dr. Ramon as outpt to continue discussion     Pj Rivera Heme/onc PGY6 [phone]

## 2022-10-04 NOTE — CONSULT NOTE ADULT - ATTENDING COMMENTS
NSCLC with ANTHONY primary tumor and brain metastasis s/p craniotomy resection and post-op RT.    Patient admitted with NSTEMI.  Continue medical management.    Plan is for outpatient systemic therapy with Pembrolizumab.      Sudhakar Ramon MD

## 2022-10-04 NOTE — PROGRESS NOTE ADULT - TIME BILLING
Patient seen and examined.  Agree with above NP note.  cv stable  no chest pAIN  ECHO NOTED  mild seg lv dysfxn  cont asa  cont iv hep  d/c plan bety  d/w son lino  they will decide on starting full therap dose oral a/c for pe, paf alone, vs low dose vs no a/c vs asa only vs asa + a/c
Agree with above NP note.  cv stable  no chest pain  ECHO NOTED  mild seg lv dysfxn  cont asa  heparin dc'ed  d/c plan today pending neurosx eval  family will decide on starting full therap dose oral a/c for pe, paf alone, vs low dose vs no a/c vs asa only vs asa + a/c

## 2022-10-04 NOTE — CONSULT NOTE ADULT - CONSULT REASON
h/o lung cancer
chest pain, nstemi
lung cancer metastatic to brain; chest pain; left vocal cord paralysis; NSTEMI

## 2022-10-04 NOTE — CONSULT NOTE ADULT - SUBJECTIVE AND OBJECTIVE BOX
HPI:  82 year old F PMH HTN, DM, HLD, CAD, MI, PAD, peripheral neuropathy, former smoker, recent admission for brain mass s/p Right craniotomy/resection 7/27/22. Hospital course complicated with STEMI and metabolic encephalopathy 2/2 UTI, new onset afib, discharged to  rehab presents with acute onset of chest pain. She states that she has been okay since discharge from rehab. The day before admission, ate lunch after which she felt excruciating pain over the left chest area, feeling severe indigestion. Not associated with radiation, diaphoresis, dyspnea, lightheadedness, palpitation, syncopal episode. She lied down in bed but the symptoms didn't improve. Since she had few heart attacks in the past, she was concerned and came to ED.     In ED, s/p tylenol. This morning, pt feels much better.  (01 Oct 2022 10:51)      Allergies  adhesives (Pruritus)  No Known Drug Allergies    MEDICATIONS  (STANDING):  artificial  tears Solution 1 Drop(s) Both EYES three times a day  aspirin enteric coated 81 milliGRAM(s) Oral daily  atorvastatin 40 milliGRAM(s) Oral at bedtime  chlorhexidine 2% Cloths 1 Application(s) Topical daily  dextrose 5%. 1000 milliLiter(s) (50 mL/Hr) IV Continuous <Continuous>  dextrose 50% Injectable 25 Gram(s) IV Push once  gabapentin 200 milliGRAM(s) Oral at bedtime  glucagon  Injectable 1 milliGRAM(s) IntraMuscular once  heparin  Infusion 700 Unit(s)/Hr (7 mL/Hr) IV Continuous <Continuous>  influenza  Vaccine (HIGH DOSE) 0.7 milliLiter(s) IntraMuscular once  insulin lispro (ADMELOG) corrective regimen sliding scale   SubCutaneous three times a day before meals  levETIRAcetam 500 milliGRAM(s) Oral two times a day  metoprolol tartrate 25 milliGRAM(s) Oral two times a day  pantoprazole    Tablet 40 milliGRAM(s) Oral before breakfast  polyethylene glycol 3350 17 Gram(s) Oral daily  senna 2 Tablet(s) Oral at bedtime    MEDICATIONS  (PRN):  acetaminophen     Tablet .. 650 milliGRAM(s) Oral every 6 hours PRN Temp greater or equal to 38C (100.4F), Mild Pain (1 - 3)  aluminum hydroxide/magnesium hydroxide/simethicone Suspension 30 milliLiter(s) Oral every 4 hours PRN Dyspepsia  dextrose Oral Gel 15 Gram(s) Oral once PRN Blood Glucose LESS THAN 70 milliGRAM(s)/deciliter  melatonin 3 milliGRAM(s) Oral at bedtime PRN Insomnia  ondansetron Injectable 4 milliGRAM(s) IV Push every 8 hours PRN Nausea and/or Vomiting      PAST MEDICAL & SURGICAL HISTORY:  Hypertension      Coronary artery disease      Hyperlipidemia      Peripheral artery disease      Myocardial infarct      Peripheral neuropathy      Diabetes      Chronic back pain      Former smoker      PAD (peripheral artery disease)      CAD (coronary artery disease)  stents          FAMILY HISTORY:  Family history of stroke (Sibling)        SOCIAL HISTORY: No EtOH, no tobacco    REVIEW OF SYSTEMS:    CONSTITUTIONAL: no fever  EYES/ENT: No visual changes;  no throat pain   NECK: No pain or stiffness  RESPIRATORY: no sob  CARDIOVASCULAR: see above hpi   GASTROINTESTINAL: No abdominal or epigastric pain. No NV/D/C  GENITOURINARY: No dysuria, change in frequency or hematuria  NEUROLOGICAL: No numbness or weakness  SKIN: No itching, burning, rashes, or lesions   MSK: no joint pain  Allergy: no urticaria         T(F): 98.4 (10-04-22 @ 11:08), Max: 98.4 (10-04-22 @ 11:08)  HR: 67 (10-04-22 @ 11:08)  BP: 119/61 (10-04-22 @ 11:08)  RR: 18 (10-04-22 @ 11:08)  SpO2: 97% (10-04-22 @ 11:08)  Wt(kg): --    GENERAL: NAD  HEENT: EOMI, MMM, no oropharyngeal lesions or erythema appreciated  Pulm: no inc wob  CV: RRR  ABDOMEN: soft, nt, nd  MSK: nl ROM  EXTREMITIES:  no appreciable edema in b/l LE  Neuro: A&Ox3  SKIN: warm and dry, no visible rash                          9.5    5.83  )-----------( 211      ( 04 Oct 2022 06:32 )             29.0       10-03    140  |  106  |  12  ----------------------------<  244<H>  3.6   |  21<L>  |  0.78    Ca    8.7      03 Oct 2022 06:26  Mg     1.8     10-03      Radiology:   < from: CT Head No Cont (10.01.22 @ 00:34) >  FINDINGS:  VENTRICLES AND SULCI:  Ventriculomegaly. The cortical sulci are not   effaced.  INTRA-AXIAL:  No acute intraparenchymal hemorrhage. Postsurgical changes   of the right temporal lobe with associated encephalomalacia and gliosis.   Overall decrease in vasogenic edema involving the right temporal lobe   when compared with 8/3/2022. Known 1.5 cm partially cystic metastatic   lesion in the high left frontal lobe with adjacent vasogenic edema is   unchanged. There is no midline shift or evidence of herniation.  EXTRA-AXIAL:  No mass or collection is seen.  VISUALIZED SINUSES:  Clear.  VISUALIZED MASTOIDS:  Clear.  CALVARIUM:  Status post right temporal craniotomy.  MISCELLANEOUS:  Bilateral cataract surgery.    IMPRESSION:  Ventriculomegaly compared to the prior study, which likely reflects   partial resolution of previously seen mass effect secondary to   postoperative edema on 8/3/2022. Attention on follow-up is advised as   these findings can reflect some degree of communicating hydrocephalus   (i.e. normal pressure hydrocephalus).    Postsurgical changes of the right temporal lobe with interval decrease in   vasogenic edema in this region. 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    No acute intracranial hemorrhage or mass effect.    < end of copied text >

## 2022-10-04 NOTE — PROGRESS NOTE ADULT - SUBJECTIVE AND OBJECTIVE BOX
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; chest pain; left vocal cord paralysis; NSTEMI    INTERVAL HISTORY: no shortness of breath or hypoxemia on room air; no cough, sputum production, hemoptysis, chest congestion or wheeze; no fevers, chills or sweats; no further chest pain/pressure or palpitations; tolerating ASA and heparin thus far without neurological complaints    REVIEW OF SYSTEMS:  Constitutional: As per interval history  HEENT: Within normal limits  CV: As per interval history  Resp: As per interval history  GI: Within normal limits   : Within normal limits  Musculoskeletal: Within normal limits  Skin: Healing abrasion on the front of the scalp due to craniotomy dressing  Neurological: ataxia  Psychiatric: Within normal limits  Endocrine: Within normal limits  Hematologic/Lymphatic: lung cancer metastatic to brain  Allergic/Immunologic: immunosuppressed on chronic steroids    MEDICATIONS:     Pulmonary "    Anti-microbials:    Cardiovascular:  metoprolol tartrate 25 milliGRAM(s) Oral two times a day    Other:  artificial  tears Solution 1 Drop(s) Both EYES three times a day  aspirin enteric coated 81 milliGRAM(s) Oral daily  atorvastatin 40 milliGRAM(s) Oral at bedtime  chlorhexidine 2% Cloths 1 Application(s) Topical daily  dextrose 5%. 1000 milliLiter(s) IV Continuous <Continuous>  dextrose 50% Injectable 25 Gram(s) IV Push once  gabapentin 200 milliGRAM(s) Oral at bedtime  glucagon  Injectable 1 milliGRAM(s) IntraMuscular once  heparin  Infusion 700 Unit(s)/Hr IV Continuous <Continuous>  influenza  Vaccine (HIGH DOSE) 0.7 milliLiter(s) IntraMuscular once  insulin lispro (ADMELOG) corrective regimen sliding scale   SubCutaneous three times a day before meals  levETIRAcetam 500 milliGRAM(s) Oral two times a day  pantoprazole    Tablet 40 milliGRAM(s) Oral before breakfast  polyethylene glycol 3350 17 Gram(s) Oral daily  senna 2 Tablet(s) Oral at bedtime    MEDICATIONS  (PRN):  acetaminophen     Tablet .. 650 milliGRAM(s) Oral every 6 hours PRN Temp greater or equal to 38C (100.4F), Mild Pain (1 - 3)  aluminum hydroxide/magnesium hydroxide/simethicone Suspension 30 milliLiter(s) Oral every 4 hours PRN Dyspepsia  dextrose Oral Gel 15 Gram(s) Oral once PRN Blood Glucose LESS THAN 70 milliGRAM(s)/deciliter  melatonin 3 milliGRAM(s) Oral at bedtime PRN Insomnia  ondansetron Injectable 4 milliGRAM(s) IV Push every 8 hours PRN Nausea and/or Vomiting    OBJECTIVE:    Daily Weight in k.3 (04 Oct 2022 08:19)    POCT Blood Glucose.: 255 mg/dL (04 Oct 2022 08:05)  POCT Blood Glucose.: 253 mg/dL (03 Oct 2022 21:50)  POCT Blood Glucose.: 238 mg/dL (03 Oct 2022 16:32)      PHYSICAL EXAM:       ICU Vital Signs Last 24 Hrs  T(C): 36.9 (04 Oct 2022 11:08), Max: 36.9 (04 Oct 2022 11:08)  T(F): 98.4 (04 Oct 2022 11:08), Max: 98.4 (04 Oct 2022 11:08)  HR: 67 (04 Oct 2022 11:08) (64 - 67)  BP: 119/61 (04 Oct 2022 11:08) (110/69 - 119/61)  BP(mean): --  ABP: --  ABP(mean): --  RR: 18 (04 Oct 2022 11:08) (18 - 18)  SpO2: 97% (04 Oct 2022 11:08) (94% - 100%) on room air     General: Awake. Alert. Cooperative. No distress. Appears stated age. Sitting up in bed  HEENT:  Atraumatic. Normocephalic. Anicteric. Normal oral mucosa. PERRL. EOMI. Partial. alopecia.  Neck: Supple. Trachea midline. Thyroid without enlargement/tenderness/nodules. No carotid bruit. No JVD.	  Cardiovascular: Regular rate and rhythm. S1 S2 normal. No murmurs, rubs or gallops.  Respiratory: Respirations unlabored. Right sided rales. No curvature.  Abdomen: Soft. Non-tender. Non-distended. No organomegaly. No masses. Normal bowel sounds.  Extremities: Warm to touch. No clubbing or cyanosis. No pedal edema.   Pulses: 2+ peripheral pulses all extremities.	  Skin: Healed craniotomy. Forehead ulcerations at the site of the craniotomy dressing now healed or crusted.  Lymph Nodes: Cervical, supraclavicular and axillary nodes normal  Neurological: Ataxia. Upper extremity intention tremor. A and O x 3.  Psychiatry: Appropriate mood and affect.    LABS:                          9.5    5.83  )-----------( 211      ( 04 Oct 2022 06:32 )             29.0     CBC    WBC  5.83 <==, 5.73 <==, 7.60 <==, 6.22 <==, 8.98 <==    Hemoglobin  9.5 <<==, 11.0 <<==, 11.3 <<==, 11.1 <<==, 12.4 <<==    Hematocrit  29.0 <==, 34.2 <==, 34.9 <==, 33.5 <==, 38.6 <==    Platelets  211 <==, 201 <==, 279 <==, 215 <==, 214 <==      140  |  106  |  12  ----------------------------<  244<H>    10-03  3.6   |  21<L>  |  0.78      LYTES    sodium  140 <==, 144 <==, 141 <==    potassium   3.6 <==, 4.0 <==, 3.4 <==, 4.0 <==    chloride  106 <==, 106 <==, 105 <==    carbon dioxide  21 <==, 21 <==, 16 <==    =============================================================================================  RENAL FUNCTION:    Creatinine:   0.78  <<==, 0.76  <<==, 0.74  <<==    BUN:   12 <==, 10 <==, 21 <==    ============================================================================================    calcium   8.7 <==, 8.4 <==, 8.7 <==    mag   1.8 <==, 1.4 <==, .9 <==    ============================================================================================  LFTs    AST:   16 <==     ALT:  13  <==     AP:  76  <=    Bili:  0.3  <=    PT/INR - ( 03 Oct 2022 06:26 )   PT: 12.2 sec;   INR: 1.06 ratio      PTT - ( 04 Oct 2022 06:32 )  PTT:77.2 sec    Serum Pro-Brain Natriuretic Peptide: 1664 pg/mL ( @ 23:12)    CARDIAC MARKERS ( 03 Oct 2022 13:49 )  CPK 46 U/L /CKMB x     /CKMB Units 2.6 ng/mL    troponin 697 ng/L    CARDIAC MARKERS ( 02 Oct 2022 09:51 )   U/L /CKMB x     /CKMB Units 11.0 ng/mL    troponin 730 ng/L    CARDIAC MARKERS ( 01 Oct 2022 13:55 )  CPK x     /CKMB x     /CKMB Units 25.9 ng/mL    troponin 430 ng/L    CARDIAC MARKERS ( 01 Oct 2022 06:15 )   U/L /CKMB x     /CKMB Units 21.7 ng/mL    troponin 221 ng/L    CARDIAC MARKERS ( 01 Oct 2022 02:17 )  CPK x     /CKMB x     /CKMB Units x        troponin 120 ng/L    CARDIAC MARKERS ( 30 Sep 2022 23:12 )  CPK 77 U/L /CKMB x     /CKMB Units 5.6 ng/mL    troponin 64 ng/L    < from: TTE with Doppler (w/Cont) (10.. @ 09:51) >    Patient name: LANIE BERTRAND  YOB: 1940   Age: 82 (F)   MR#: [id number]  Study Date: 10/3/2022  ------------------------------------------------------------------------  Dimensions:    Normal Values:  LA:     3.6    2.0 - 4.0 cm  Ao:     3.5    2.0 - 3.8 cm  SEPTUM: 1.0    0.6 - 1.2 cm  PWT:    0.8    0.6 - 1.1 cm  LVIDd:  4.5    3.0 - 5.6 cm  LVIDs:  2.9    1.8 - 4.0 cm  Derived variables:  LVMI: 73 g/m2  RWT: 0.35  Fractional short: 36 %  EF (Visual Estimate): 40-45 %  ------------------------------------------------------------------------  Conclusions:  1. Normal mitral valve. Mild mitral regurgitation.  2. Normal left atrium.  LA volume index = 21 cc/m2.  Endocardial visualization enhanced with intravenous  injection of Ultrasonic Enhancing Agent (Definity).  Moderate segmental left ventricular systolic dysfunction.  LVEF visually is 40-45%. Akinesis of the inferior with  hypokinesis of the inferolateral, septum and anterolateral  hypokinetic. Remaining walls are normal. No left  ventricular thrombus.  4. Mild diastolic dysfunction (Stage I).  5. Normal right atrium.  6. Normal right ventricular size and function.  7. Normal tricuspid valve. No tricuspid regurgitation.RVSP  not calculated due to insufficient Doppler signal.  8. No pericardial effusion seen.  *** Compared with echocardiogram of 2022, there is a  decrease in LVEF and wall motion abnormalities are new.  ------------------------------------------------------------------------  Confirmed on  10/3/2022 - 17:14:47 by Mariposa Jose M.D.  ------------------------------------------------------------------------  ---------------------------------------------------------------------------------------------------------------  MICROBIOLOGY:     COVID-19 PCR . (10.01.22 @ 02:36)   [facility]      RADIOLOGY:  [x ] Chest radiographs reviewed and interpreted by me    EXAM:  CT BRAIN                          PROCEDURE DATE:  10/01/2022      IMPRESSION:  Ventriculomegaly compared to the prior study, which likely reflects   partial resolution of previously seen mass effect secondary to   postoperative edema on 8/3/2022. Attention on follow-up is advised as   these findings can reflect some degree of communicating hydrocephalus   (i.e. normal pressure hydrocephalus).    Postsurgical changes of the right temporal lobe with interval decrease in   vasogenic edema in this region. 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    No acute intracranial hemorrhage or mass effect.    KATIE MEADE MD; Resident Radiologist  This documenthas been electronically signed.   KRISTOPHER DUNNE MD; Attending Radiologist  This document has been electronically signed. Oct  1 2022  1:39AM  ---------------------------------------------------------------------------------------------------------------  PETCT SKUL-THI ONC FDG INIT  - ORDERED BY: BURTON KILGORE    PROCEDURE DATE:  2022       OTHER STUDIES USED FOR CORRELATION: CTA of chest dated 8/3/2022 and CT   head dated 8/3/2022.    FINDINGS:    HEAD/NECK: Right temporal craniotomy again noted. Mild FDG activity in   overlying scalp is compatible with recent surgery. Photopenia in right   temporal lobe corresponding to area of hypoattenuation suggestive of   edema and/or encephalomalacia/postsurgical changes. Previously noted   lesion in left medial frontal lobe is not imaged. No focus of increased   FDG activity within the visualized portions of the brain.    Asymmetric FDG activity in the vocal cords, present on the right, and   absent on the left, compatible with left vocal cord paralysis. No   enlarged or FDG-avid cervical lymph node.    CHEST: No FDG-avid mediastinal or hilar lymph node.    LUNGS: FDG-avid left upper lobe mass abutting mediastinum at the level of   the AP window measures 2.7 x 2.1 cm, SUV 9.6 (image 71), unchanged as   compared to CT dated 8/3/2022. Atelectasis along the mediastinum, also is   unchanged.    PLEURA/PERICARDIUM: No abnormal FDG activity. No effusion.    HEPATOBILIARY/PANCREAS:  Physiologic FDG activity. Liver SUV mean is 3.0.    SPLEEN: Physiologic FDG activity. Normal in size.    ADRENAL GLANDS: No abnormal FDG activity. No nodule.    KIDNEYS/URINARY BLADDER: Physiologic excreted FDG activity.    REPRODUCTIVE ORGANS: No abnormal FDG activity.    ABDOMINOPELVIC NODES: No enlarged or FDG-avid lymph node.    BOWEL/PERITONEUM/MESENTERY: Diffuse small bowel and pancolonic   hypermetabolism, without corresponding abnormalities on CT, likely is   related to metformin.    BONES/SOFT TISSUES: No abnormal FDG activity. Right shoulder prosthesis.   Nonspecific, mildly increased radiotracer activity in right greater than   left glenohumeral joints.    IMPRESSION: Abnormal FDG-PET/CT scan.    1. FDG-avid left upper lobe lung mass abutting the mediastinum at level   of AP window, with associated atelectasis along the mediastinum,   unchanged as compared to CT dated 8/3/2022, corresponds to patient's   known adenocarcinoma.    2. Right temporal craniotomy with photopenic low attenuation within right   temporal lobe, compatible with vasogenic edema, gliosis, and/or   postsurgical changes, better evaluated on contrast-enhanced CT or MRI.    3. Findings suggestive of left vocal cord paralysis, likely secondary to   left upper lobe lung mass. Please correlate clinically and with direct   visualization, as indicated.    4. Nonspecific hypermetabolism in small bowel and colon likely is related   to metformin.    5. No scan evidence of metastatic disease.    JENNIFER BENEDICT MD; Attending Nuclear Medicine   This document has been electronically signed. Sep 11 2022  7:13AM  ---------------------------------------------------------------------------------------------------------------

## 2022-10-04 NOTE — PROGRESS NOTE ADULT - SUBJECTIVE AND OBJECTIVE BOX
CARDIOLOGY FOLLOW UP - Dr. Alcaraz  DATE OF SERVICE: 10/4/22     CC no cp or sob   no HA   feels well       REVIEW OF SYSTEMS:  CONSTITUTIONAL: No fever, weight loss, or fatigue  RESPIRATORY: No cough, wheezing, chills or hemoptysis; No Shortness of Breath  CARDIOVASCULAR: No chest pain, palpitations, passing out, dizziness, or leg swelling  GASTROINTESTINAL: No abdominal or epigastric pain. No nausea, vomiting, or hematemesis; No diarrhea or constipation. No melena or hematochezia.  VASCULAR: No edema     PHYSICAL EXAM:  T(C): 36.9 (10-04-22 @ 11:08), Max: 36.9 (10-04-22 @ 11:08)  HR: 67 (10-04-22 @ 11:08) (64 - 67)  BP: 119/61 (10-04-22 @ 11:08) (110/69 - 119/61)  RR: 18 (10-04-22 @ 11:08) (18 - 18)  SpO2: 97% (10-04-22 @ 11:08) (94% - 100%)  Wt(kg): --  I&O's Summary    03 Oct 2022 07:01  -  04 Oct 2022 07:00  --------------------------------------------------------  IN: 1140 mL / OUT: 0 mL / NET: 1140 mL    04 Oct 2022 07:01  -  04 Oct 2022 13:22  --------------------------------------------------------  IN: 360 mL / OUT: 0 mL / NET: 360 mL        Appearance: Normal	  Cardiovascular: Normal S1 S2,RRR, No JVD, No murmurs  Respiratory: Lungs clear to auscultation	  Gastrointestinal:  Soft, Non-tender, + BS	  Extremities: Normal range of motion, No clubbing, cyanosis or edema      Home Medications:  hydroCHLOROthiazide 25 mg oral tablet: 1 tab(s) orally , As Needed (01 Oct 2022 11:50)  melatonin 3 mg oral tablet: 1 tab(s) orally once a day (at bedtime) (01 Oct 2022 11:50)  Tresiba FlexTouch: 12 unit(s) subcutaneous once a day    Note:Pt gets from MD office (01 Oct 2022 11:49)  Victoza 18 mg/3 mL subcutaneous solution: 1.8 milligram(s) subcutaneous once a day (01 Oct 2022 11:50)      MEDICATIONS  (STANDING):  artificial  tears Solution 1 Drop(s) Both EYES three times a day  aspirin enteric coated 81 milliGRAM(s) Oral daily  atorvastatin 40 milliGRAM(s) Oral at bedtime  chlorhexidine 2% Cloths 1 Application(s) Topical daily  dextrose 5%. 1000 milliLiter(s) (50 mL/Hr) IV Continuous <Continuous>  dextrose 50% Injectable 25 Gram(s) IV Push once  gabapentin 200 milliGRAM(s) Oral at bedtime  glucagon  Injectable 1 milliGRAM(s) IntraMuscular once  heparin  Infusion 700 Unit(s)/Hr (7 mL/Hr) IV Continuous <Continuous>  influenza  Vaccine (HIGH DOSE) 0.7 milliLiter(s) IntraMuscular once  insulin lispro (ADMELOG) corrective regimen sliding scale   SubCutaneous three times a day before meals  levETIRAcetam 500 milliGRAM(s) Oral two times a day  metoprolol tartrate 25 milliGRAM(s) Oral two times a day  pantoprazole    Tablet 40 milliGRAM(s) Oral before breakfast  polyethylene glycol 3350 17 Gram(s) Oral daily  senna 2 Tablet(s) Oral at bedtime      TELEMETRY: 	nsr    ECG:  	  RADIOLOGY:   DIAGNOSTIC TESTING:  [ ] Echocardiogram:  [ ]  Catheterization:  [ ] Stress Test:    OTHER: 	    LABS:	 	    Troponin T, High Sensitivity Result: 697 ng/L [0 - 51] (10-03 @ 13:49)  Creatine Kinase, Serum: 46 U/L [25 - 170] (10-03 @ 13:49)  CKMB Units: 2.6 ng/mL [0.0 - 3.8] (10-03 @ 13:49)  Creatine Kinase, Serum: 110 U/L [25 - 170] (10-02 @ 09:51)  CKMB Units: 11.0 ng/mL [0.0 - 3.8] (10-02 @ 09:51)  Troponin T, High Sensitivity Result: 730 ng/L [0 - 51] (10-02 @ 09:51)  Troponin T, High Sensitivity Result: 430 ng/L [0 - 51] (10-01 @ 13:55)  CKMB Units: 25.9 ng/mL [0.0 - 3.8] (10-01 @ 13:55)  Creatine Kinase, Serum: 155 U/L [25 - 170] (10-01 @ 06:15)  CKMB Units: 21.7 ng/mL [0.0 - 3.8] (10-01 @ 06:15)  Troponin T, High Sensitivity Result: 221 ng/L [0 - 51] (10-01 @ 06:15)  Troponin T, High Sensitivity Result: 120 ng/L [0 - 51] (10-01 @ 02:17)  Troponin T, High Sensitivity Result: 64 ng/L [0 - 51] (09-30 @ 23:12)  Creatine Kinase, Serum: 77 U/L [25 - 170] (09-30 @ 23:12)  CKMB Units: 5.6 ng/mL [0.0 - 3.8] (09-30 @ 23:12)                          9.5    5.83  )-----------( 211      ( 04 Oct 2022 06:32 )             29.0     10-03    140  |  106  |  12  ----------------------------<  244<H>  3.6   |  21<L>  |  0.78    Ca    8.7      03 Oct 2022 06:26  Mg     1.8     10-03      PT/INR - ( 03 Oct 2022 06:26 )   PT: 12.2 sec;   INR: 1.06 ratio         PTT - ( 04 Oct 2022 06:32 )  PTT:77.2 sec

## 2022-10-04 NOTE — PHARMACOTHERAPY INTERVENTION NOTE - COMMENTS
Current Therapy: SSI  Home Therapy: Vishnu Wilkes (pt cannot confirm doses when prompted, says "she takes according to her sugars". [facility] has 22 units listed.   HgbA1C: 7.4%  24h POCT:  POCT Blood Glucose.: 346 mg/dL (04 Oct 2022 12:13)  POCT Blood Glucose.: 255 mg/dL (04 Oct 2022 08:05)  POCT Blood Glucose.: 253 mg/dL (03 Oct 2022 21:50)  POCT Blood Glucose.: 238 mg/dL (03 Oct 2022 16:32)    Diet: Regular    Recommend starting basal Lantus 5 units SQ QHS for tighter glycemic control.     Kaylee Castañeda, PharmD, BCPS  Clinical Pharmacy Specialist  [phone] or Teams    Current Therapy: SSI  Home Therapy: [facility]ish[facility] Wilkes (pt cannot confirm doses when prompted, says "she takes according to her sugars". [facility] has 12 units listed.   HgbA1C: 7.4%  24h POCT:  POCT Blood Glucose.: 346 mg/dL (04 Oct 2022 12:13)  POCT Blood Glucose.: 255 mg/dL (04 Oct 2022 08:05)  POCT Blood Glucose.: 253 mg/dL (03 Oct 2022 21:50)  POCT Blood Glucose.: 238 mg/dL (03 Oct 2022 16:32)    Diet: Regular    Recommend starting basal Lantus 5 units SQ QHS for tighter glycemic control.     Kaylee Castañeda, PharmD, BCPS  Clinical Pharmacy Specialist  [phone] or Teams

## 2022-10-05 ENCOUNTER — TRANSCRIPTION ENCOUNTER (OUTPATIENT)
Age: 82
End: 2022-10-05

## 2022-10-05 VITALS
SYSTOLIC BLOOD PRESSURE: 109 MMHG | TEMPERATURE: 98 F | OXYGEN SATURATION: 96 % | DIASTOLIC BLOOD PRESSURE: 71 MMHG | HEART RATE: 60 BPM | RESPIRATION RATE: 18 BRPM

## 2022-10-05 LAB
GLUCOSE BLDC GLUCOMTR-MCNC: 217 MG/DL — HIGH (ref 70–99)
GLUCOSE BLDC GLUCOMTR-MCNC: 304 MG/DL — HIGH (ref 70–99)
HCT VFR BLD CALC: 31.2 % — LOW (ref 34.5–45)
HGB BLD-MCNC: 10 G/DL — LOW (ref 11.5–15.5)
MCHC RBC-ENTMCNC: 30.8 PG — SIGNIFICANT CHANGE UP (ref 27–34)
MCHC RBC-ENTMCNC: 32.1 GM/DL — SIGNIFICANT CHANGE UP (ref 32–36)
MCV RBC AUTO: 96 FL — SIGNIFICANT CHANGE UP (ref 80–100)
NRBC # BLD: 0 /100 WBCS — SIGNIFICANT CHANGE UP (ref 0–0)
PLATELET # BLD AUTO: 213 K/UL — SIGNIFICANT CHANGE UP (ref 150–400)
RBC # BLD: 3.25 M/UL — LOW (ref 3.8–5.2)
RBC # FLD: 14.2 % — SIGNIFICANT CHANGE UP (ref 10.3–14.5)
WBC # BLD: 5.84 K/UL — SIGNIFICANT CHANGE UP (ref 3.8–10.5)
WBC # FLD AUTO: 5.84 K/UL — SIGNIFICANT CHANGE UP (ref 3.8–10.5)

## 2022-10-05 PROCEDURE — 70450 CT HEAD/BRAIN W/O DYE: CPT | Mod: 26

## 2022-10-05 RX ORDER — LIRAGLUTIDE 6 MG/ML
3 INJECTION SUBCUTANEOUS
Qty: 0 | Refills: 0 | DISCHARGE

## 2022-10-05 RX ORDER — INSULIN GLARGINE 100 [IU]/ML
12 INJECTION, SOLUTION SUBCUTANEOUS
Qty: 0 | Refills: 0 | DISCHARGE
Start: 2022-10-05

## 2022-10-05 RX ORDER — INSULIN GLARGINE 100 [IU]/ML
5 INJECTION, SOLUTION SUBCUTANEOUS
Qty: 0 | Refills: 0 | DISCHARGE
Start: 2022-10-05

## 2022-10-05 RX ORDER — INSULIN DEGLUDEC 100 U/ML
12 INJECTION, SOLUTION SUBCUTANEOUS
Qty: 0 | Refills: 0 | DISCHARGE

## 2022-10-05 RX ORDER — PANTOPRAZOLE SODIUM 20 MG/1
1 TABLET, DELAYED RELEASE ORAL
Qty: 30 | Refills: 0
Start: 2022-10-05 | End: 2022-11-03

## 2022-10-05 RX ORDER — APIXABAN 2.5 MG/1
1 TABLET, FILM COATED ORAL
Qty: 60 | Refills: 0
Start: 2022-10-05 | End: 2022-11-03

## 2022-10-05 RX ORDER — LIRAGLUTIDE 6 MG/ML
1.8 INJECTION SUBCUTANEOUS
Qty: 0 | Refills: 0 | DISCHARGE

## 2022-10-05 RX ORDER — APIXABAN 2.5 MG/1
1 TABLET, FILM COATED ORAL
Qty: 0 | Refills: 0 | DISCHARGE
Start: 2022-10-05

## 2022-10-05 RX ORDER — PANTOPRAZOLE SODIUM 20 MG/1
1 TABLET, DELAYED RELEASE ORAL
Qty: 0 | Refills: 0 | DISCHARGE
Start: 2022-10-05

## 2022-10-05 RX ADMIN — Medication 25 MILLIGRAM(S): at 05:11

## 2022-10-05 RX ADMIN — POLYETHYLENE GLYCOL 3350 17 GRAM(S): 17 POWDER, FOR SOLUTION ORAL at 11:10

## 2022-10-05 RX ADMIN — LEVETIRACETAM 500 MILLIGRAM(S): 250 TABLET, FILM COATED ORAL at 05:10

## 2022-10-05 RX ADMIN — Medication 1 DROP(S): at 05:11

## 2022-10-05 RX ADMIN — Medication 1 DROP(S): at 13:25

## 2022-10-05 RX ADMIN — APIXABAN 2.5 MILLIGRAM(S): 2.5 TABLET, FILM COATED ORAL at 05:11

## 2022-10-05 RX ADMIN — Medication 4: at 08:28

## 2022-10-05 RX ADMIN — Medication 8: at 12:29

## 2022-10-05 RX ADMIN — CHLORHEXIDINE GLUCONATE 1 APPLICATION(S): 213 SOLUTION TOPICAL at 11:10

## 2022-10-05 RX ADMIN — INFLUENZA VIRUS VACCINE 0.7 MILLILITER(S): 15; 15; 15; 15 SUSPENSION INTRAMUSCULAR at 15:20

## 2022-10-05 RX ADMIN — PANTOPRAZOLE SODIUM 40 MILLIGRAM(S): 20 TABLET, DELAYED RELEASE ORAL at 05:10

## 2022-10-05 NOTE — PROGRESS NOTE ADULT - ASSESSMENT
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 Hospital course complicated with STEMI and metabolic encephalopathy 2/2 UTI, new onset afib, discharged to  rehab presents with acute onset of chest pain, concerning for ACS     STEMI (ST elevation myocardial infarction).   · NSTEMI, CAD, s/p PCI  -chest pain now resolved  -ECG without significant dynamic changes from previous  -trop trending down  -CKMB peaked, downtrending   -ASA 81  -iv hep as ordered  -neurosx input appreciated  -had recent STEMI in 8/2022 treated medically at the time in setting of recent craniotomy  -echo 8/8/22 with EF 60-65%, basal inferolateral wall, the basal inferior wall, the basal anterolateral wall, and the mid inferior wall are hypokinetic  -f/u echo   -will d/c iv hep bety if remains angina free and ckmb remains stable     Lung cancer metastatic to brain.   ·  Plan: MR brain 7/20/22 with Right temporal lobe peripherally enhancing, centrally necrotic 3.1 x 2.6 cm mass with surrounding vasogenic edema with METS  - s/p right craniotomy for resection of brain mass 7/27/22  - Bronchial biopsy consistent with poorly differentiated adenocarcinoma 7/20/22  - continue with Brivaracetam 50mg BID and decadron taper  - continue depakote 500mg q8hrs for seizure ppx  - appreciate NSG recs  - follows Dr. Ramon at [facility] - plan for immunotherapy in 2 weeks  - follows Dr. Funes at [facility] for RT - s/p 3 sessions of RT last week.     Paroxysmal atrial fibrillation.   -cont iv hep  -was on eliquis on last admission, but patient says she has not taken since discharge  -will need to discuss with pt and fam regarding home oral a/c  -meotprolol xl 25 qd    h/o pulm embolism  - on iv heparin     DM II (diabetes mellitus, type II), controlled.   - on metformin at home  - continue FSS+ISS inpatient  - A1C. 7.4    HTN (hypertension).   -stable  -BB  -hold arb/ccb to minimize hypotension     Prophylactic measure.   -  DVT ppx: on heparin gtt  Dispo: PT consult.  - dispo  - home PT  
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 Hospital course complicated with STEMI and metabolic encephalopathy 2/2 UTI, new onset afib, discharged to  rehab presents with acute onset of chest pain, concerning for ACS     STEMI (ST elevation myocardial infarction).   · NSTEMI, CAD, s/p PCI  -chest pain now resolved  -ECG without significant dynamic changes from previous  -trop trending up  -CKMB peaked, downtrending   -ASA 81  -iv hep as ordered  -neurosx input appreciated  -had recent STEMI in 8/2022 treated medically at the time in setting of recent craniotomy  -echo 8/8/22 with EF 60-65%, basal inferolateral wall, the basal inferior wall, the basal anterolateral wall, and the mid inferior wall are hypokinetic  -f/u echo   -will d/c iv hep bety if remains angina free and ckmb remains stable     Lung cancer metastatic to brain.   ·  Plan: MR brain 7/20/22 with Right temporal lobe peripherally enhancing, centrally necrotic 3.1 x 2.6 cm mass with surrounding vasogenic edema with METS  - s/p right craniotomy for resection of brain mass 7/27/22  - Bronchial biopsy consistent with poorly differentiated adenocarcinoma 7/20/22  - continue with Brivaracetam 50mg BID and decadron taper  - continue depakote 500mg q8hrs for seizure ppx  - appreciate NSG recs  - follows Dr. Ramon at [facility] - plan for immunotherapy in 2 weeks  - follows Dr. Funes at [facility] for RT - s/p 3 sessions of RT last week.     Paroxysmal atrial fibrillation.   ·  Plan: currently in sinus rhythm  - pt was started on eliquis on last admission, but seems like she wasn't on it at home  - continue lopressor 25mg BID  - will verify with pharmacy.     DM II (diabetes mellitus, type II), controlled.   ·  Plan: on metformin at home  - continue FSS+ISS inpatient  - check A1C.    HTN (hypertension).   ·  Plan: antihypertensives (losartan, amlodipine) were dc'ed last admission 2/2 orthostatic hypotension  - continue to monitor BP closely.     Prophylactic measure.   ·  Plan: DVT ppx: pending NSG clearance on initiation of heparin gtt  Dispo: PT consult.  
ASSESSMENT:     82 year old gentlewoman, former smoker, without history of intrinsic lung disease. She has a history of HTN, HLD, DM, CAD s/p PCI (1990s and 2021) and PAD s/p RLE stenting. The patient was admitted in July with a marked change in personality (hyperactive, loquacious, etc) and ataxia after an outpatient cervical MRI incidentally noted a CNS lesion. Lumbar spine MRI performed due to chronic back pain revealed multilevel foraminal stenosis/disc bulges. CT head -> cystic versus necrotic masses in the right temporal lobe and in the medial left frontal lobe - moderate to large amount of vasogenic edema in the right frontal, parietal and temporal lobes - regional mass effect in the right cerebral hemisphere with partial effacement of the ventricular system and 5 mm subfalcine herniation of the right frontal lobe underneath the cerebral falx. She underwent right craniotomy for resection of tumor on July 27th. Post operative course was notable for an acute STEMI, atrial fibrillation, encephalopathy due to a UTI and a right middle lobe pulmonary embolism. She tolerated ASA and heparin gtt -> Eliquis without neurological complications. Cardiac catheterization was deferred as her recent craniotomy would limit further anti-platelet therapy. She was discharged to [place] acute rehab where she was treated with Brivaracetam and depakote for seizure prophylaxis and tapered off steroids. She has received radiation therapy to the resection cavity of the right frontal lobe. She is scheduled to receive radiation to the left parafalcine lesoin. She is awaiting initiation of chemoimmunotherapy in several weeks pending improvement in functional status. The patient returned to the ER on 9/30 with chest discomfort described as severe "indigestion" and a headache. Pain improved somewhat with antacids. She had no shortness of breath or hypoxemia on room air. No cough, sputum production, hemoptysis, chest congestion or wheeze. No fevers, chills or sweats. She was found to have a NSTEMI with elevated cardiac markers but without significant EKG changes. She has been started on a heparin gtt in addition to ASA/statin/beta-blocker.     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/p right craniotomy and radiation therapy soon to be followed by immunotherapy - recent PET/CT is without evidence of distant metastatic disease -> FDG-avid left upper lobe lung mass abutting the mediastinum at level of AP window with associated atelectasis along the mediastinum corresponds to patient's known adenocarcinoma - right temporal craniotomy with photopenic low attenuation within the right temporal lobe is compatible with vasogenic edema, gliosis, and/or postsurgical changes - left vocal cord paralysis likely due to the left upper lobe lung mass encroaching on the recurrent laryngeal nerve    admitted with a NSTEMI    PLAN/RECOMMENDATIONS:    stable oxygenation on room air  spirometry    FEV1 - 1.81 liters - 88% predicted    FVC - 2.48 liters - 90% predicted    FEV1% - 73       c/w normal spirometry  observe off pulmonary medications and antibiotics  would continue lifelong A/C for paroxysmal atrial fibrillation and a history of VTE disease in a patient with underlying malignancy and relative immopbility  cardiology follow-up    cardiac meds: ASA/lipitor/metoprolol/heparin gtt - A/C to be discussed with family    ECHO -> new moderate segmental left ventricular systolic dysfunction - LVEF visually is 40-45% - akinesis of the inferior wall with hypokinesis of the inferolateral, septum and anterolateral walls - the remaining walls are normal - no left ventricular thrombus - mild diastolic dysfunction (Stage I).    no cardiac catheterization to be pursued at this time  neurosurgery evaluation noted    head CT -> ventriculomegaly compared to the prior study, which likely reflects partial resolution of the previously seen mass effect secondary to postoperative edema on 8/3/2022 - attention on follow-up is advised as these findings can reflect some degree of communicating hydrocephalus ((i.e. normal pressure hydrocephalus) - postsurgical changes of the right temporal lobe with interval decrease in vasogenic edema in this region - known cystic metastatic lesion in the high left frontal lobe is unchanged - no acute intracranial hemorrhage or mass effect.    off systemic steroids    on keppra for seizure prophylaxis (had also been on brivaracetam)  glucose control  bowel regimen  physical therapy    Will follow with you. Plan of care discussed with the patient at bedside and the dedicated floor NP.    Satish Hopkins MD, [facility]  [phone]  Pulmonary Medicine    
[facility] 6/23/21:   s/p successful angioplasty to the mid RCA followed by intracoronary brachytherapy.  [facility] 4/14/21: POBA of mid RCA (80%)   [facility] 3/25/21: PCI/POBA to pCX 90%   Echo 7/22/22: Normal left ventricular internal dimensions with discrete upper septal hypertrophy. Estimated ejection fraction 55%, The inferolateral wall is akinetic.  Echo 8/2/22:  grossly, preserved left ventricular systolic function with segmental wall motion abnormalities. EF approximately 55%. The mid to distal inferolateral and mid to distal inferior segments are hypkinetic. The apex is akinetic. No left ventricular thrombus seen.  limited Echo 8/8/22: EF 60-65%;  Mild segmental left ventricular systolic dysfunction; No left ventricular thrombus. The basal inferolateral wall, the basal inferior wall, the basal  anterolateral wall, and the mid inferior wall are hypokinetic.    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 Hospital course complicated with STEMI and metabolic encephalopathy 2/2 UTI, new onset afib, discharged to  rehab presents with acute onset of chest pain, concerning for ACS    #NSTEMI, CAD, s/p PCI  -chest pain now resolved  -ECG without significant dynamic changes from previous  -trop trending up  -CKMB peaked, downtrending   -ASA 81  -iv hep as ordered-- CHECK HS TROP CK CKMB today  --will d/c iv hep today if  ckmb remains stable  -neurosx input appreciated  -had recent STEMI in 8/2022 treated medically at the time in setting of recent craniotomy  -echo 8/8/22 with EF 60-65%, basal inferolateral wall, the basal inferior wall, the basal anterolateral wall, and the mid inferior wall are hypokinetic  -f/u echo     #Metast. Lung cancer to brain.   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   -cont iv hep  -was on eliquis on last admission, but patient says she has not taken since discharge  -will need to discuss with pt and fam and NEURo SX  regarding home oral a/c  -meotprolol xl 25 qd    #hypertension  -stable  -BB  -hold arb/ccb to minimize hypotension    #history of PE - dx in AUG 2022   -iv hep for now            
[facility] 6/23/21:   s/p successful angioplasty to the mid RCA followed by intracoronary brachytherapy.  [facility] 4/14/21: POBA of mid RCA (80%)   [facility] 3/25/21: PCI/POBA to pCX 90%   Echo 7/22/22: Normal left ventricular internal dimensions with discrete upper septal hypertrophy. Estimated ejection fraction 55%, The inferolateral wall is akinetic.  Echo 8/2/22:  grossly, preserved left ventricular systolic function with segmental wall motion abnormalities. EF approximately 55%. The mid to distal inferolateral and mid to distal inferior segments are hypkinetic. The apex is akinetic. No left ventricular thrombus seen.  limited Echo 8/8/22: EF 60-65%;  Mild segmental left ventricular systolic dysfunction; No left ventricular thrombus. The basal inferolateral wall, the basal inferior wall, the basal  anterolateral wall, and the mid inferior wall are hypokinetic.    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 Hospital course complicated with STEMI and metabolic encephalopathy 2/2 UTI, new onset afib, discharged to  rehab presents with acute onset of chest pain, concerning for ACS    #NSTEMI, CAD, s/p PCI  -chest pain now resolved  -ECG without significant dynamic changes from previous  -trop trending up  -CKMB peaked, downtrending   -ASA 81  -iv hep as ordered  -neurosx input appreciated  -had recent STEMI in 8/2022 treated medically at the time in setting of recent craniotomy  -echo 8/8/22 with EF 60-65%, basal inferolateral wall, the basal inferior wall, the basal anterolateral wall, and the mid inferior wall are hypokinetic  -f/u echo   -will d/c iv hep bety if remains angina free and ckmb remains stable    #Metast. Lung cancer to brain.   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   -cont iv hep  -was on eliquis on last admission, but patient says she has not taken since discharge  -will need to discuss with pt and fam regarding home oral a/c  -meotprolol xl 25 qd    #hypertension  -stable  -BB  -hold arb/ccb to minimize hypotension    #history of PE   -iv hep    45 minutes spent on total encounter; more than 50% of the visit was spent counseling and/or coordinating care by the attending physician.        
[facility] 6/23/21:   s/p successful angioplasty to the mid RCA followed by intracoronary brachytherapy.  [facility] 4/14/21: POBA of mid RCA (80%)   [facility] 3/25/21: PCI/POBA to pCX 90%   Echo 7/22/22: Normal left ventricular internal dimensions with discrete upper septal hypertrophy. Estimated ejection fraction 55%, The inferolateral wall is akinetic.  Echo 8/2/22:  grossly, preserved left ventricular systolic function with segmental wall motion abnormalities. EF approximately 55%. The mid to distal inferolateral and mid to distal inferior segments are hypkinetic. The apex is akinetic. No left ventricular thrombus seen.  limited Echo 8/8/22: EF 60-65%;  Mild segmental left ventricular systolic dysfunction; No left ventricular thrombus. The basal inferolateral wall, the basal inferior wall, the basal  anterolateral wall, and the mid inferior wall are hypokinetic.  echo 10/3/22  ef 40 -45%:  mild MR Moderate  segmental left ventricular systolic dysfunction.  Akinesis of the inferior with hypokinesis of the inferolateral, septum and anterolateral hypokinetic. Remaining walls are normal. No left ventricular thrombus  Akinesis of the inferior with hypokinesis of the inferolateral, septum and anterolateral hypokinetic. Remaining walls are normal. 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 Hospital course complicated with STEMI and metabolic encephalopathy 2/2 UTI, new onset afib, discharged to  rehab presents with acute onset of chest pain, concerning for ACS    #NSTEMI, CAD, s/p PCI  -chest pain now resolved  -ECG without significant dynamic changes from previous  -initially trop trending up  -CKMB peaked, cardaic enzymes now downtrending   -d/w neurosx, agreeable to initiating low dose eliquis  -will hold asa for now  -had recent STEMI in 8/2022 treated medically at the time in setting of recent craniotomy  -echo 8/8/22 with EF 60-65%, basal inferolateral wall, the basal inferior wall, the basal anterolateral wall, and the mid inferior wall are hypokinetic  -repeat echo noted ef 40 -45%:  Moderate  segmental left ventricular systolic dysfunction.  Akinesis of the inferior with hypokinesis of the inferolateral, septum and anterolateral hypokinetic. Remaining walls are normal. No left ventricular thrombus  Akinesis of the inferior with hypokinesis of the inferolateral, septum and anterolateral hypokinetic. Remaining walls are normal. No left ventricular thrombus      #Metast. Lung cancer to brain.   -s/p right craniotomy for resection of brain mass 7/27/22  -Bronchial biopsy consistent with poorly differentiated adenocarcinoma 7/20/22  -Brivaracetam 50mg BID and decadron taper  -depakote   -ct head 10/1/22 noted- neurosx FU noted  -repeat head CT stable on low dose elquis  -per d/w neurosx if exam remains stable can eventually increase to full dose DOAC    #Paroxysmal atrial fibrillation.   -can hold hep gtt as mentioned above   -was on eliquis on last admission, but patient says she has not taken since discharge ? she was taking lovenox as outpt   -eliquis started at above  -meotprolol xl 25 qd    #hypertension  -stable  -BB  -hold arb/ccb to minimize hypotension    #history of PE - dx in AUG 2022   -oral ac to be determine       DCP   outpt followup w Dr Uribe and Selina    35 minutes spent on total encounter; more than 50% of the visit was spent counseling and/or coordinating care by the attending physician.  
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 Hospital course complicated with STEMI and metabolic encephalopathy 2/2 UTI, new onset afib, discharged to  rehab presents with acute onset of chest pain, concerning for ACS     NSTEMI, CAD, s/p PCI  -chest pain now resolved  -ECG without significant dynamic changes from previous  -initially trop trending up  -CKMB peaked, cardaic enzymes now downtrending   -d/w neurosx, agreeable to initiating low dose eliquis  -will hold asa for now  -had recent STEMI in 8/2022 treated medically at the time in setting of recent craniotomy  -echo 8/8/22 with EF 60-65%, basal inferolateral wall, the basal inferior wall, the basal anterolateral wall, and the mid inferior wall are hypokinetic  -repeat echo noted ef 40 -45%:  Moderate  segmental left ventricular systolic dysfunction.  Akinesis of the inferior with hypokinesis of the inferolateral, septum and anterolateral hypokinetic. Remaining walls are normal. No left ventricular thrombus  Akinesis of the inferior with hypokinesis of the inferolateral, septum and anterolateral hypokinetic. Remaining walls are normal. No left ventricular thrombus     Lung cancer metastatic to brain.   ·  Plan: MR brain 7/20/22 with Right temporal lobe peripherally enhancing, centrally necrotic 3.1 x 2.6 cm mass with surrounding vasogenic edema with METS  - s/p right craniotomy for resection of brain mass 7/27/22  - Bronchial biopsy consistent with poorly differentiated adenocarcinoma 7/20/22  - continue with Brivaracetam 50mg BID and decadron taper  - continue depakote 500mg q8hrs for seizure ppx  - appreciate NSG recs  - follows Dr. Ramon at [facility] - plan for immunotherapy in 2 weeks  - follows Dr. Funes at [facility] for RT - s/p 3 sessions of RT last week.     Paroxysmal atrial fibrillation.   -cont iv hep  -was on eliquis on last admission, but patient says she has not taken since discharge  -will need to discuss with pt and fam regarding home oral a/c  -meotprolol xl 25 qd    h/o pulm embolism  - on iv heparin     DM II (diabetes mellitus, type II), controlled.   - on metformin at home  - continue FSS+ISS inpatient  - A1C. 7.4    HTN (hypertension).   -stable  -BB  -hold arb/ccb to minimize hypotension     Prophylactic measure.   -  DVT ppx: on heparin gtt  Dispo: PT consult.  - dispo  - home PT  
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 Hospital course complicated with STEMI and metabolic encephalopathy 2/2 UTI, new onset afib, discharged to  rehab presents with acute onset of chest pain, concerning for ACS     STEMI (ST elevation myocardial infarction).   · NSTEMI, CAD, s/p PCI  -chest pain now resolved  -ECG without significant dynamic changes from previous  -trop trending up  -CKMB peaked, downtrending   -ASA 81  -iv hep as ordered  -neurosx input appreciated  -had recent STEMI in 8/2022 treated medically at the time in setting of recent craniotomy  -echo 8/8/22 with EF 60-65%, basal inferolateral wall, the basal inferior wall, the basal anterolateral wall, and the mid inferior wall are hypokinetic  -f/u echo   -will d/c iv hep bety if remains angina free and ckmb remains stable     Lung cancer metastatic to brain.   ·  Plan: MR brain 7/20/22 with Right temporal lobe peripherally enhancing, centrally necrotic 3.1 x 2.6 cm mass with surrounding vasogenic edema with METS  - s/p right craniotomy for resection of brain mass 7/27/22  - Bronchial biopsy consistent with poorly differentiated adenocarcinoma 7/20/22  - continue with Brivaracetam 50mg BID and decadron taper  - continue depakote 500mg q8hrs for seizure ppx  - appreciate NSG recs  - follows Dr. Ramon at [facility] - plan for immunotherapy in 2 weeks  - follows Dr. Funes at [facility] for RT - s/p 3 sessions of RT last week.     Paroxysmal atrial fibrillation.   -cont iv hep  -was on eliquis on last admission, but patient says she has not taken since discharge  -will need to discuss with pt and fam regarding home oral a/c  -meotprolol xl 25 qd    h/o pulm embolism  - on iv heparin     DM II (diabetes mellitus, type II), controlled.   - on metformin at home  - continue FSS+ISS inpatient  - A1C. 7.4    HTN (hypertension).   -stable  -BB  -hold arb/ccb to minimize hypotension     Prophylactic measure.   -  DVT ppx: pending NSG clearance on initiation of heparin gtt  Dispo: PT consult.  
[facility] 6/23/21:   s/p successful angioplasty to the mid RCA followed by intracoronary brachytherapy.  [facility] 4/14/21: POBA of mid RCA (80%)   [facility] 3/25/21: PCI/POBA to pCX 90%   Echo 7/22/22: Normal left ventricular internal dimensions with discrete upper septal hypertrophy. Estimated ejection fraction 55%, The inferolateral wall is akinetic.  Echo 8/2/22:  grossly, preserved left ventricular systolic function with segmental wall motion abnormalities. EF approximately 55%. The mid to distal inferolateral and mid to distal inferior segments are hypkinetic. The apex is akinetic. No left ventricular thrombus seen.  limited Echo 8/8/22: EF 60-65%;  Mild segmental left ventricular systolic dysfunction; No left ventricular thrombus. The basal inferolateral wall, the basal inferior wall, the basal  anterolateral wall, and the mid inferior wall are hypokinetic.  echo 10/3/22  ef 40 -45%:  mild MR Moderate  segmental left ventricular systolic dysfunction.  Akinesis of the inferior with hypokinesis of the inferolateral, septum and anterolateral hypokinetic. Remaining walls are normal. No left ventricular thrombus  Akinesis of the inferior with hypokinesis of the inferolateral, septum and anterolateral hypokinetic. Remaining walls are normal. 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 Hospital course complicated with STEMI and metabolic encephalopathy 2/2 UTI, new onset afib, discharged to  rehab presents with acute onset of chest pain, concerning for ACS    #NSTEMI, CAD, s/p PCI  -chest pain now resolved  -ECG without significant dynamic changes from previous  -initially trop trending up  -CKMB peaked, cardaic enzymes now downtrending   -c/w ASA 81  -can DC HEP GTT ckmb nl  -neurosx input appreciated  -had recent STEMI in 8/2022 treated medically at the time in setting of recent craniotomy  -echo 8/8/22 with EF 60-65%, basal inferolateral wall, the basal inferior wall, the basal anterolateral wall, and the mid inferior wall are hypokinetic  -repeat echo noted ef 40 -45%:  Moderate  segmental left ventricular systolic dysfunction.  Akinesis of the inferior with hypokinesis of the inferolateral, septum and anterolateral hypokinetic. Remaining walls are normal. No left ventricular thrombus  Akinesis of the inferior with hypokinesis of the inferolateral, septum and anterolateral hypokinetic. Remaining walls are normal. No left ventricular thrombus      #Metast. Lung cancer to brain.   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   -can hold hep gtt as mentioned above   -was on eliquis on last admission, but patient says she has not taken since discharge ? she was taking lovenox as outpt   -attending to d/w with neuro sdonovan lorenzo  regarding starting oral a/c  -meotprolol xl 25 qd    #hypertension  -stable  -BB  -hold arb/ccb to minimize hypotension    #history of PE - dx in AUG 2022   -oral ac to be determine       DCP pending decision of oral ac , possible head ct prior to dc       
ASSESSMENT:     82 year old gentlewoman, former smoker, without history of intrinsic lung disease. She has a history of HTN, HLD, DM, CAD s/p PCI (1990s and 2021) and PAD s/p RLE stenting. The patient was admitted in July with a marked change in personality (hyperactive, loquacious, etc) and ataxia after an outpatient cervical MRI incidentally noted a CNS lesion. Lumbar spine MRI performed due to chronic back pain revealed multilevel foraminal stenosis/disc bulges. CT head -> cystic versus necrotic masses in the right temporal lobe and in the medial left frontal lobe - moderate to large amount of vasogenic edema in the right frontal, parietal and temporal lobes - regional mass effect in the right cerebral hemisphere with partial effacement of the ventricular system and 5 mm subfalcine herniation of the right frontal lobe underneath the cerebral falx. She underwent right craniotomy for resection of tumor on July 27th. Post operative course was notable for an acute STEMI, atrial fibrillation, encephalopathy due to a UTI and a right middle lobe pulmonary embolism. She tolerated ASA and heparin gtt -> Eliquis without neurological complications. Cardiac catheterization was deferred as her recent craniotomy would limit further anti-platelet therapy. She was discharged to [facility] where she was treated with Brivaracetam and depakote for seizure prophylaxis and tapered off steroids. She has received radiation therapy to the resection cavity of the right frontal lobe. She is scheduled to receive radiation to the left parafalcine lesoin. She is awaiting initiation of chemoimmunotherapy in several weeks pending improvement in functional status. The patient returned to the ER on 9/30 with chest discomfort described as severe "indigestion" and a headache. Pain improved somewhat with antacids. She had no shortness of breath or hypoxemia on room air. No cough, sputum production, hemoptysis, chest congestion or wheeze. No fevers, chills or sweats. She was found to have a NSTEMI with elevated cardiac markers but without significant EKG changes. She has been started on a heparin gtt in addition to ASA/statin/beta-blocker.     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/p right craniotomy and radiation therapy soon to be followed by immunotherapy - recent PET/CT is without evidence of distant metastatic disease -> FDG-avid left upper lobe lung mass abutting the mediastinum at level of AP window with associated atelectasis along the mediastinum corresponds to patient's known adenocarcinoma - right temporal craniotomy with photopenic low attenuation within the right temporal lobe is compatible with vasogenic edema, gliosis, and/or postsurgical changes - left vocal cord paralysis likely due to the left upper lobe lung mass encroaching on the recurrent laryngeal nerve    admitted with a NSTEMI    PLAN/RECOMMENDATIONS:    stable oxygenation on room air  spirometry    FEV1 - 1.81 liters - 88% predicted    FVC - 2.48 liters - 90% predicted    FEV1% - 73       c/w normal spirometry  observe off pulmonary medications and antibiotics  would continue lifelong A/C for paroxysmal atrial fibrillation and 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    no cardiac catheterization to be pursued at this time  neurosurgery evaluation noted    head CT -> ventriculomegaly compared to the prior study, which likely reflects partial resolution of the previously seen mass effect secondary to postoperative edema on 8/3/2022 - attention on follow-up is advised as these findings can reflect some degree of communicating hydrocephalus ((i.e. normal pressure hydrocephalus) - postsurgical changes of the right temporal lobe with interval decrease in vasogenic edema in this region - known cystic metastatic lesion in the high left frontal lobe is unchanged - no acute intracranial hemorrhage or mass effect -> repeat CT is without change    off systemic steroids    on keppra for seizure prophylaxis (had also been on brivaracetam)  glucose control  bowel regimen  physical therapy    Will follow with you. Plan of care discussed with the patient at bedside and the dedicated floor NP. No pulmonary objection to discharge.    Satish Hopkins MD, [facility]  [phone]  Pulmonary Medicine

## 2022-10-05 NOTE — DISCHARGE NOTE NURSING/CASE MANAGEMENT/SOCIAL WORK - PATIENT PORTAL LINK FT
You can access the FollowMyHealth Patient Portal offered by [facility] by registering at the following website: [email or web address]. By joining [facility]’s FollowMyHealth portal, you will also be able to view your health information using other applications (apps) compatible with our system.

## 2022-10-05 NOTE — DISCHARGE NOTE PROVIDER - HOSPITAL COURSE
82 year old F PMH HTN, DM, HLD, CAD, MI, PAD, peripheral neuropathy, former smoker, NSCLC with ANTHONY primary tumor and brain metastasis s/p right craniotomy,   /resection on 7/27/22 and post-op RT. Hospital course complicated with STEMI and metabolic encephalopathy 2/2 UTI, new onset afib, RML PE discharged to  rehab presents with acute onset of chest pain.     Patient admitted with NSTEMI,  - Started on Heparin drip after neuro clearance to start AC and patient/family in agreement to proceeding with therapeutic a/c   NSTEMI  Pt had recent STEMI in 8/2022 treated medically at the time in setting of recent craniotomy  - ECG without significant dynamic changes from previous  - Initially trop trending up, CKMB peaked, cardiac enzymes then downtrending   - Initially placed on Heparin drip after Nsx consult  - Echo 8/8/22 with EF 60-65%, basal inferolateral wall, the basal inferior wall, the basal anterolateral wall, and the mid inferior wall are hypokinetic  - Repeat echo noted ef 40 -45%:  Moderate  segmental left ventricular systolic dysfunction.  Akinesis of the inferior with hypokinesis of the inferolateral, septum and anterolateral hypokinetic. Remaining walls are normal. No left ventricular thrombus  Akinesis of the inferior with hypokinesis of the inferolateral, septum and anterolateral hypokinetic. Remaining walls are normal. No left ventricular thrombus.  -Chest pain now resolved    #PE and Afib  Post craniotomy - in July 2022 complicated by NSTEMI, A. fib with RVR and RML PE. She  was started on prophylactic dosing of anticoagulation with enoxaparin 40 mg subcu daily, pending clearance as outpt for therapeutic anticoagulation by neurosurgery. She was subsequently discharged to acute rehab on 8/10–8/26/2022. and then to home with 24 hr HHA    #Paroxysmal atrial fibrillation.   Was placed on hep gtt with Neuro Sx clearance and transitioned to Eliquis low dose with plans to increase to full dose as outpt with Nsx clearance  -Metoprolol xl 25 qd  Pending repeat CT head result post after resumption of AC      #history of PE - dx in AUG 2022   Was placed on hep gtt with Neuro Sx clearance and transitioned to Eliquis low dose with plans to increase to full dose as outpt with Nsx clearance  Pending repeat CT head result post after resumption of AC     #metastatic lung adenocarcinoma mets to brain  -Bronchial biopsy consistent with poorly differentiated adenocarcinoma 7/20/22  - Pt underwent craniotomy resection of the dominant brain mass on 7/27/2022 and s/p post op RT  - Hospital course was complicated by NSTEMI, A. fib with RVR and RML PE. She was put on medications for seizure prophylaxis. She was put on anticoagulation and was started on prophylactic dosing of anticoagulation with enoxaparin 40 mg subcu daily, pending clearance as outpt for therapeutic anticoagulation by neurosurgery. She was subsequently discharged to acute rehab on 8/10–8/26/2022. and then to home with 24 hr HHA  patient was tentatively planned to be treated with chemoimmunotherapy as outpt   Follow up with Oncologist - Dr. Ramon as outpt  -Brivaracetam 50mg BID and decadron taper  -depakote     #hypertension  -stable  -BB  -hold arb/ccb to minimize hypotension   82 year old F PMH HTN, DM, HLD, CAD, MI, PAD, peripheral neuropathy, former smoker, NSCLC with ANTHONY primary tumor and brain metastasis s/p right craniotomy,   /resection on 7/27/22 and post-op RT. Hospital course complicated with STEMI and metabolic encephalopathy 2/2 UTI, new onset afib, RML PE discharged to  rehab presents with acute onset of chest pain.     Patient admitted with NSTEMI,  - Started on Heparin drip after neuro clearance to start AC and patient/family in agreement to proceeding with therapeutic a/c   NSTEMI  Pt had recent STEMI in 8/2022 treated medically at the time in setting of recent craniotomy  - ECG without significant dynamic changes from previous  - Initially trop trending up, CKMB peaked, cardiac enzymes then downtrending   - Initially placed on Heparin drip after Nsx consult  - Echo 8/8/22 with EF 60-65%, basal inferolateral wall, the basal inferior wall, the basal anterolateral wall, and the mid inferior wall are hypokinetic  - Repeat echo noted ef 40 -45%:  Moderate  segmental left ventricular systolic dysfunction.  Akinesis of the inferior with hypokinesis of the inferolateral, septum and anterolateral hypokinetic. Remaining walls are normal. No left ventricular thrombus  Akinesis of the inferior with hypokinesis of the inferolateral, septum and anterolateral hypokinetic. Remaining walls are normal. No left ventricular thrombus.  -Chest pain now resolved. Discontinue Aspirin as pt now on low dose Eliquis    #PE and Afib  Post craniotomy - in July 2022 complicated by NSTEMI, A. fib with RVR and RML PE. She  was started on prophylactic dosing of anticoagulation with enoxaparin 40 mg subcu daily, pending clearance as outpt for therapeutic anticoagulation by neurosurgery. She was subsequently discharged to acute rehab on 8/10–8/26/2022. and then to home with 24 hr HHA    #Paroxysmal atrial fibrillation.   Was placed on hep gtt with Neuro Sx clearance and transitioned to Eliquis low dose with plans to increase to full dose as outpt with Nsx clearance  -Metoprolol xl 25 qd  - Repeat CT head post after resumption of AC  is stable,     #history of PE - dx in AUG 2022   Was placed on hep gtt with Neuro Sx clearance and transitioned to Eliquis low dose with plans to increase to full dose as outpt with Nsx clearance  - Repeat CT head post after resumption of AC  is stable    #metastatic lung adenocarcinoma mets to brain  -Bronchial biopsy consistent with poorly differentiated adenocarcinoma 7/20/22  - Pt underwent craniotomy resection of the dominant brain mass on 7/27/2022 and s/p post op RT  - Hospital course was complicated by NSTEMI, A. fib with RVR and RML PE. She was put on medications for seizure prophylaxis. She was put on anticoagulation and was started on prophylactic dosing of anticoagulation with enoxaparin 40 mg subcu daily, pending clearance as outpt for therapeutic anticoagulation by neurosurgery. She was subsequently discharged to acute rehab on 8/10–8/26/2022. and then to home with 24 hr HHA  patient was tentatively planned to be treated with chemoimmunotherapy as outpt   Follow up with Oncologist - Dr. Ramon as outpt  Continue [facility]    #hypertension  -stable  -BB  -hold Arb to minimize hypotension      - Repeat CT head post after resumption of low dose AC is stable, medically cleared by Dr. Alcaraz to discharge pt home with assist with follow up with NSx in 1 week 82 year old F PMH HTN, DM, HLD, CAD, MI, PAD, peripheral neuropathy, former smoker, NSCLC with ANTHONY primary tumor and brain metastasis s/p right craniotomy,   /resection on 7/27/22 and post-op RT. Hospital course complicated with STEMI and metabolic encephalopathy 2/2 UTI, new onset afib, RML PE discharged to  rehab presents with acute onset of chest pain.     Patient admitted with NSTEMI,  - Started on Heparin drip after neuro clearance to start AC and patient/family in agreement to proceeding with therapeutic a/c   NSTEMI  Pt had recent STEMI in 8/2022 treated medically at the time in setting of recent craniotomy  - ECG without significant dynamic changes from previous  - Initially trop trending up, CKMB peaked, cardiac enzymes then downtrending   - Initially placed on Heparin drip after Nsx consult  - Echo 8/8/22 with EF 60-65%, basal inferolateral wall, the basal inferior wall, the basal anterolateral wall, and the mid inferior wall are hypokinetic  - Repeat echo noted ef 40 -45%:  Moderate  segmental left ventricular systolic dysfunction.  Akinesis of the inferior with hypokinesis of the inferolateral, septum and anterolateral hypokinetic. Remaining walls are normal. No left ventricular thrombus  Akinesis of the inferior with hypokinesis of the inferolateral, septum and anterolateral hypokinetic. Remaining walls are normal. No left ventricular thrombus.  -Chest pain now resolved. Discontinue Aspirin as pt now on low dose Eliquis    #PE and Afib  Post craniotomy - in July 2022 complicated by NSTEMI, A. fib with RVR and RML PE. She  was started on prophylactic dosing of anticoagulation with enoxaparin 40 mg subcu daily, pending clearance as outpt for therapeutic anticoagulation by neurosurgery. She was subsequently discharged to acute rehab on 8/10–8/26/2022. and then to home with 24 hr HHA    #Paroxysmal atrial fibrillation.   Was placed on hep gtt with Neuro Sx clearance and transitioned to Eliquis low dose with plans to increase to full dose as outpt with Nsx clearance  -Metoprolol xl 25 qd  - Repeat CT head post after resumption of AC  is stable,     #history of PE - dx in AUG 2022   Was placed on hep gtt with Neuro Sx clearance and transitioned to Eliquis low dose with plans to increase to full dose as outpt with Nsx clearance  - Repeat CT head post after resumption of AC  is stable    #metastatic lung adenocarcinoma mets to brain  -Bronchial biopsy consistent with poorly differentiated adenocarcinoma 7/20/22  - Pt underwent craniotomy resection of the dominant brain mass on 7/27/2022 and s/p post op RT  - Hospital course was complicated by NSTEMI, A. fib with RVR and RML PE. She was put on medications for seizure prophylaxis. She was put on anticoagulation and was started on prophylactic dosing of anticoagulation with enoxaparin 40 mg subcu daily, pending clearance as outpt for therapeutic anticoagulation by neurosurgery. She was subsequently discharged to acute rehab on 8/10–8/26/2022. and then to home with 24 hr HHA  patient was tentatively planned to be treated with chemoimmunotherapy as outpt   Follow up with Oncologist - Dr. Ramon as outpt  Continue [facility]    #Hypertension  -stable  -BB  -hold Arb to minimize hypotension      - Repeat CT head on 10/5/22 after resumption of low dose AC is stable, medically cleared by Dr. Alcaraz to discharge pt home with assist with follow up with NSx and cardiology in 1 week

## 2022-10-05 NOTE — PHARMACOTHERAPY INTERVENTION NOTE - COMMENTS
Counseled patient and son at bedside on the following discharge medications names (brand/generic), indication, and possible side effects:  Eliquis 2.5mg BID (per pt has never taken med before but per [facility], pt has some doses there, but d/c on lovenox?)    Eliquis RX sent to [facility] pharmacy for pricing - copay is $47/ month. First month free trial coupon applied. Discussed with son and pt and both are agreeable and will  med at [facility]. Needs refills for Protonix.     Also reviewed home med list (see discharge order reconciliation) with patient and son. All other home meds unchanged.     Patient was provided with a medication card for their new medication. Patient questions and concerns were answered and addressed. Patient demonstrated understanding.    Kaylee Castañeda, PharmD, [facility]  Clinical Pharmacy Specialist  [phone] or Teams

## 2022-10-05 NOTE — PROGRESS NOTE ADULT - SUBJECTIVE AND OBJECTIVE BOX
CARDIOLOGY FOLLOW UP - Dr. Alcaraz  Date of Service: 10/5/2022  CC: no cp/sob    Review of Systems:  Constitutional: No fever, weight loss, or fatigue  Respiratory: No cough, wheezing, or hemoptysis, no shortness of breath  Cardiovascular: No chest pain, palpitations, passing out, dizziness, or leg swelling  Gastrointestinal: No abd or epigastric pain. No nausea, vomiting, or hematemesis; no diarrhea or consiptaiton, no melena or hematochezia  Vascular: No edema     TELEMETRY:    PHYSICAL EXAM:  T(C): 36.6 (10-05-22 @ 11:03), Max: 36.6 (10-05-22 @ 11:03)  HR: 60 (10-05-22 @ 11:03) (60 - 77)  BP: 109/71 (10-05-22 @ 11:03) (109/71 - 136/75)  RR: 18 (10-05-22 @ 11:03) (18 - 18)  SpO2: 96% (10-05-22 @ 11:03) (96% - 99%)  Wt(kg): --  I&O's Summary    04 Oct 2022 07:01  -  05 Oct 2022 07:00  --------------------------------------------------------  IN: 1044 mL / OUT: 0 mL / NET: 1044 mL        Appearance: Normal	  Cardiovascular: Normal S1 S2,RRR, No JVD, No murmurs  Respiratory: Lungs clear to auscultation	  Gastrointestinal:  Soft, Non-tender, + BS	  Extremities: Normal range of motion, No clubbing, cyanosis or edema  Vascular: Peripheral pulses palpable 2+ bilaterally       Home Medications:  apixaban 2.5 mg oral tablet: 1 tab(s) orally 2 times a day (05 Oct 2022 11:20)  hydroCHLOROthiazide 25 mg oral tablet: 1 tab(s) orally , As Needed (01 Oct 2022 11:50)  melatonin 3 mg oral tablet: 1 tab(s) orally once a day (at bedtime) (01 Oct 2022 11:50)  pantoprazole 40 mg oral delayed release tablet: 1 tab(s) orally once a day (before a meal) (05 Oct 2022 11:20)  Tresiba FlexTouch: 12 unit(s) subcutaneous once a day    Note:Pt gets from MD office (01 Oct 2022 11:49)  Victoza 18 mg/3 mL subcutaneous solution: 1.8 milligram(s) subcutaneous once a day (01 Oct 2022 11:50)        MEDICATIONS  (STANDING):  apixaban 2.5 milliGRAM(s) Oral two times a day  artificial  tears Solution 1 Drop(s) Both EYES three times a day  atorvastatin 40 milliGRAM(s) Oral at bedtime  chlorhexidine 2% Cloths 1 Application(s) Topical daily  dextrose 5%. 1000 milliLiter(s) (100 mL/Hr) IV Continuous <Continuous>  dextrose 5%. 1000 milliLiter(s) (50 mL/Hr) IV Continuous <Continuous>  dextrose 5%. 1000 milliLiter(s) (50 mL/Hr) IV Continuous <Continuous>  dextrose 50% Injectable 25 Gram(s) IV Push once  dextrose 50% Injectable 12.5 Gram(s) IV Push once  dextrose 50% Injectable 25 Gram(s) IV Push once  gabapentin 200 milliGRAM(s) Oral at bedtime  glucagon  Injectable 1 milliGRAM(s) IntraMuscular once  influenza  Vaccine (HIGH DOSE) 0.7 milliLiter(s) IntraMuscular once  insulin glargine Injectable (LANTUS) 5 Unit(s) SubCutaneous at bedtime  insulin lispro (ADMELOG) corrective regimen sliding scale   SubCutaneous three times a day before meals  insulin lispro (ADMELOG) corrective regimen sliding scale   SubCutaneous at bedtime  levETIRAcetam 500 milliGRAM(s) Oral two times a day  metoprolol tartrate 25 milliGRAM(s) Oral two times a day  pantoprazole    Tablet 40 milliGRAM(s) Oral before breakfast  polyethylene glycol 3350 17 Gram(s) Oral daily  senna 2 Tablet(s) Oral at bedtime        EKG:  RADIOLOGY:  DIAGNOSTIC TESTING:  [ ] Echocardiogram:  [ ] Catherterization:  [ ] Stress Test:  OTHER:     LABS:	 	                          10.0   5.84  )-----------( 213      ( 05 Oct 2022 06:43 )             31.2             PTT - ( 04 Oct 2022 14:56 )  PTT:44.6 sec    CARDIAC MARKERS:

## 2022-10-05 NOTE — DISCHARGE NOTE PROVIDER - NSDCCPCAREPLAN_GEN_ALL_CORE_FT
PRINCIPAL DISCHARGE DIAGNOSIS  Diagnosis: NSTEMI (non-ST elevation myocardial infarction)  Assessment and Plan of Treatment: You had elevated troponin in 8/2022 after surgery treated medically at the time in setting of recent craniotomy  ECG with no significant dynamic changes from previous  - Echo EF 40 -45%:    -Chest pain now resolved.   SEEK IMMEDIATE MEDICAL CARE IF:  You have increased chest pain or pain that spreads to your arm, neck, jaw, back, or abdomen.   You develop shortness of breath, an increasing cough, or you are coughing up blood.  You have severe back or abdominal pain, feel nauseous, or vomit.  You develop severe weakness, fainting, or chills.  THIS IS AN EMERGENCY. Do not wait to see if the pain will go away. Get medical help at once. Call your local emergency services (____________[phone]_________). Do not drive yourself to the hospital.        SECONDARY DISCHARGE DIAGNOSES  Diagnosis: Pulmonary embolism  Assessment and Plan of Treatment: History of PE - dx in AUG 2022   Continue Metoprolol xl 25 qd  You were placed on hep gtt with Neuro Sx clearance and transitioned to Eliquis low dose.  Repeat CT head post after starting blood thinner is stable,   - Follow up with Dr. Uribe and Dr. Loomis in 1 week  with plans to increase to full dose as outpt with Nsx clearance.  If you develop shortness of breath or if your shortness of breath worsens call your Health Care Provider or go to the Emergency Department.      Diagnosis: Atrial fibrillation  Assessment and Plan of Treatment: Post craniotomy - in July 2022 you developed,  A. fib with RVR and RML PE.  You were placed on hep drip with Neuro Sx clearance and transitioned to Eliquis low dose.  Repeat CT head post after starting blood thinner is stable,   - Follow up with Dr. Uribe and Dr. Loomis in 1 week  with plans to increase to full dose as outpt with Nsx clearance   Atrial fibrillation is the most common heart rhythm problem.  The condition puts you at risk for has stroke and heart attack  You were started on blood thinners - Eliquis to prevent possible clot and stroke complications.   Monitor for any signs of bleeding and avoid injury while on this medication  If any bleeding persistent and symptomatic stop the medication and notify your doctor immediately.  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 Ramon as outpt  Continue Keppra.  Seek medical help for any change in mental status, vision changes, facial and limb weakness, change in speech.

## 2022-10-05 NOTE — DISCHARGE NOTE PROVIDER - NSDCFUADDAPPT_GEN_ALL_CORE_FT
APPTS ARE READY TO BE MADE: [x] YES    Best Family or Patient Contact (if needed):    Additional Information about above appointments (if needed):    1:   2:   3:     Other comments or requests:    APPTS ARE READY TO BE MADE: [x] YES    Best Family or Patient Contact (if needed):    Additional Information about above appointments (if needed):    1:   2:   3:     Other comments or requests:   Patient was scheduled with Dr. Eduard Loomis on 11/ 03 1pm at [address] [place] [address] through the provider's office. Patient advised of appointment details.																			                              Patient was scheduled with JESSICA Stevens on 10/13 12:00pm at [address] [place] [address] [address] [place] [address] through the provider's office. Patient advised of appointment details.

## 2022-10-05 NOTE — DISCHARGE NOTE PROVIDER - NSDCMRMEDTOKEN_GEN_ALL_CORE_FT
acetaminophen 325 mg oral tablet: 2 tab(s) orally every 6 hours, As needed, Temp greater or equal to 38C (100.4F), Mild Pain (1 - 3)  apixaban 2.5 mg oral tablet: 1 tab(s) orally 2 times a day  atorvastatin 40 mg oral tablet: 1 tab(s) orally once a day (at bedtime)  bisacodyl 5 mg oral delayed release tablet: 1 tab(s) orally once a day, As needed, Constipation  gabapentin 100 mg oral capsule: 2 cap(s) orally once a day (at bedtime)  hydroCHLOROthiazide 25 mg oral tablet: 1 tab(s) orally , As Needed  Keppra 500 mg oral tablet: 1 tab(s) orally 2 times a day   melatonin 3 mg oral tablet: 1 tab(s) orally once a day (at bedtime)  metFORMIN 500 mg oral tablet: 1 tab(s) orally 2 times a day  metoprolol tartrate 25 mg oral tablet: 1 tab(s) orally 2 times a day  ocular lubricant ophthalmic solution: 1 drop(s) to each affected eye 3 times a day  pantoprazole 40 mg oral delayed release tablet: 1 tab(s) orally once a day (before a meal)  senna leaf extract oral tablet: 2 tab(s) orally once a day (at bedtime), As Needed  Tresiba FlexTouch: 12 unit(s) subcutaneous once a day    Note:Pt gets from MD office  Victoza 18 mg/3 mL subcutaneous solution: 1.8 milligram(s) subcutaneous once a day   acetaminophen 325 mg oral tablet: 2 tab(s) orally every 6 hours, As needed, Temp greater or equal to 38C (100.4F), Mild Pain (1 - 3)  apixaban 2.5 mg oral tablet: 1 tab(s) orally 2 times a day  atorvastatin 40 mg oral tablet: 1 tab(s) orally once a day (at bedtime)  bisacodyl 5 mg oral delayed release tablet: 1 tab(s) orally once a day, As needed, Constipation  gabapentin 100 mg oral capsule: 2 cap(s) orally once a day (at bedtime)  insulin glargine 100 units/mL subcutaneous solution: 12 unit(s) subcutaneous once a day (at bedtime)- Home dose  Keppra 500 mg oral tablet: 1 tab(s) orally 2 times a day   melatonin 3 mg oral tablet: 1 tab(s) orally once a day (at bedtime)  metFORMIN 500 mg oral tablet: 1 tab(s) orally 2 times a day  metoprolol tartrate 25 mg oral tablet: 1 tab(s) orally 2 times a day  ocular lubricant ophthalmic solution: 1 drop(s) to each affected eye 3 times a day  pantoprazole 40 mg oral delayed release tablet: 1 tab(s) orally once a day (before a meal)  senna leaf extract oral tablet: 2 tab(s) orally once a day (at bedtime), As Needed  Victoza 18 mg/3 mL subcutaneous solution: 3 milliliter(s) subcutaneous once a week   acetaminophen 325 mg oral tablet: 2 tab(s) orally every 6 hours, As needed, Temp greater or equal to 38C (100.4F), Mild Pain (1 - 3)  apixaban 2.5 mg oral tablet: 1 tab(s) orally 2 times a day  atorvastatin 40 mg oral tablet: 1 tab(s) orally once a day (at bedtime)  bisacodyl 5 mg oral delayed release tablet: 1 tab(s) orally once a day, As needed, Constipation  gabapentin 100 mg oral capsule: 2 cap(s) orally once a day (at bedtime)  insulin glargine 100 units/mL subcutaneous solution: 12 unit(s) subcutaneous once a day (at bedtime)- Home dose  Keppra 500 mg oral tablet: 1 tab(s) orally 2 times a day   melatonin 3 mg oral tablet: 1 tab(s) orally once a day (at bedtime)  metFORMIN 500 mg oral tablet: 1 tab(s) orally 2 times a day  metoprolol tartrate 25 mg oral tablet: 1 tab(s) orally 2 times a day  ocular lubricant ophthalmic solution: 1 drop(s) to each affected eye 3 times a day  pantoprazole 40 mg oral delayed release tablet: 1 tab(s) orally once a day (before a meal)  senna leaf extract oral tablet: 2 tab(s) orally once a day (at bedtime), As Needed

## 2022-10-05 NOTE — DISCHARGE NOTE NURSING/CASE MANAGEMENT/SOCIAL WORK - NSDCVIVACCINE_GEN_ALL_CORE_FT
No Vaccines Administered. influenza, high-dose, quadrivalent; 05-Oct-2022 15:20; Meera Jha (RN); [employer]; [id number] (Exp. Date: 30-Jun-2023); IntraMuscular; Deltoid Left.; 0.7 milliLiter(s); VIS (VIS Published: 06-Aug-2021, VIS Presented: 05-Oct-2022);

## 2022-10-05 NOTE — DISCHARGE NOTE PROVIDER - NSDCFUSCHEDAPPT_GEN_ALL_CORE_FT
[facility]  [facility]  Scheduled Appointment: 10/11/2022    Sudhakar Ramon  [facility]  [facility]  Scheduled Appointment: 11/01/2022    [facility]  [facility]  Scheduled Appointment: 11/01/2022    Sudhakar Ramon  [facility]  [facility]  Scheduled Appointment: 11/22/2022    [facility]  [facility]  Scheduled Appointment: 11/22/2022    Rafael Funes  [address]  Scheduled Appointment: 12/08/2022    [facility]  [facility]  Scheduled Appointment: 12/13/2022    [facility]  [facility]  Scheduled Appointment: 12/13/2022     [facility]  [facility]  Scheduled Appointment: 10/11/2022    Sudhakar Ramon  [facility]  [facility]  Scheduled Appointment: 11/01/2022    [facility]  [facility]  Scheduled Appointment: 11/01/2022    Willard Loomis  [facility]  NEUROSURG [address] [facility]  Scheduled Appointment: 11/03/2022    Sudhakar Ramon  [facility]  [facility]  Scheduled Appointment: 11/22/2022    [facility]  [facility]  Scheduled Appointment: 11/22/2022    Rafael Funes  [facility]  [facility] [address] [place] [address]  Scheduled Appointment: 12/08/2022    [facility]  [facility]  Scheduled Appointment: 12/13/2022    [facility]  [facility]  Scheduled Appointment: 12/13/2022

## 2022-10-05 NOTE — CHART NOTE - NSCHARTNOTEFT_GEN_A_CORE
Left (1) message(s) for patient in regards to follow up care with callback information.
Neurosurgery note reviewed (regarding recommendation for anticoagulation)  Dr Alcaraz contacted   - As per Dr Alcaraz:  - Heparin Gtt - patient specific with goal PTT 50-60, NO INITIAL BOLUS - ordered  - ASA 81mg PO daily  - Close Neuro monitoring -  Above discussed with patient and her daughter-in-law Suzan at bedside and son Dick (HCP) (over phone) - They are able to verbalize risks of anticoagulation as documented by neurosurgery.  They all agree to Heparin drip (at rate as recommended by Dr Alcaraz) as well as ASA 81mg   Ordered as recommended
Today's CT Head (10/1/22) is without any new signs of intracranial hemorrhage. While there is no absolute neurosurgical contraindication to systemic anti coagulation, there does exist an increased risk of intracranial hemorrhage which can result in significant morbidity including but not limited to headache, seizure, stroke, paralysis, and in severe cases even death.    The risks and benefits of starting systemic anticoagulation must be considered carefully in the setting of the patients medical history and current clinical condition.    If AC is started...  - Recommend NO bolus with low ptt goal of ~60-65  - Recommend obtaining follow up CTH after patient is therapeutic and to notify neurosurgery immediately with any changes in the patients neurologic exam

## 2022-10-05 NOTE — PROGRESS NOTE ADULT - SUBJECTIVE AND OBJECTIVE BOX
DATE OF SERVICE: 10-05-22 @ 14:44    Patient is a 82y old  Female who presents with a chief complaint of chest pain (05 Oct 2022 11:43)      SUBJECTIVE / OVERNIGHT EVENTS:  No chest pain. No shortness of breath. No complaints. No events overnight.     MEDICATIONS  (STANDING):  apixaban 2.5 milliGRAM(s) Oral two times a day  artificial  tears Solution 1 Drop(s) Both EYES three times a day  atorvastatin 40 milliGRAM(s) Oral at bedtime  chlorhexidine 2% Cloths 1 Application(s) Topical daily  dextrose 5%. 1000 milliLiter(s) (100 mL/Hr) IV Continuous <Continuous>  dextrose 5%. 1000 milliLiter(s) (50 mL/Hr) IV Continuous <Continuous>  dextrose 5%. 1000 milliLiter(s) (50 mL/Hr) IV Continuous <Continuous>  dextrose 50% Injectable 25 Gram(s) IV Push once  dextrose 50% Injectable 12.5 Gram(s) IV Push once  dextrose 50% Injectable 25 Gram(s) IV Push once  gabapentin 200 milliGRAM(s) Oral at bedtime  glucagon  Injectable 1 milliGRAM(s) IntraMuscular once  influenza  Vaccine (HIGH DOSE) 0.7 milliLiter(s) IntraMuscular once  insulin glargine Injectable (LANTUS) 5 Unit(s) SubCutaneous at bedtime  insulin lispro (ADMELOG) corrective regimen sliding scale   SubCutaneous three times a day before meals  insulin lispro (ADMELOG) corrective regimen sliding scale   SubCutaneous at bedtime  levETIRAcetam 500 milliGRAM(s) Oral two times a day  metoprolol tartrate 25 milliGRAM(s) Oral two times a day  pantoprazole    Tablet 40 milliGRAM(s) Oral before breakfast  polyethylene glycol 3350 17 Gram(s) Oral daily  senna 2 Tablet(s) Oral at bedtime    MEDICATIONS  (PRN):  acetaminophen     Tablet .. 650 milliGRAM(s) Oral every 6 hours PRN Temp greater or equal to 38C (100.4F), Mild Pain (1 - 3)  aluminum hydroxide/magnesium hydroxide/simethicone Suspension 30 milliLiter(s) Oral every 4 hours PRN Dyspepsia  dextrose Oral Gel 15 Gram(s) Oral once PRN Blood Glucose LESS THAN 70 milliGRAM(s)/deciliter  dextrose Oral Gel 15 Gram(s) Oral once PRN Blood Glucose LESS THAN 70 milliGRAM(s)/deciliter  melatonin 3 milliGRAM(s) Oral at bedtime PRN Insomnia  ondansetron Injectable 4 milliGRAM(s) IV Push every 8 hours PRN Nausea and/or Vomiting      Vital Signs Last 24 Hrs  T(C): 36.6 (05 Oct 2022 11:03), Max: 36.6 (05 Oct 2022 11:03)  T(F): 97.9 (05 Oct 2022 11:03), Max: 97.9 (05 Oct 2022 11:03)  HR: 60 (05 Oct 2022 11:03) (60 - 77)  BP: 109/71 (05 Oct 2022 11:03) (109/71 - 136/75)  BP(mean): --  RR: 18 (05 Oct 2022 11:03) (18 - 18)  SpO2: 96% (05 Oct 2022 11:03) (96% - 99%)    Parameters below as of 05 Oct 2022 11:03  Patient On (Oxygen Delivery Method): room air      CAPILLARY BLOOD GLUCOSE      POCT Blood Glucose.: 304 mg/dL (05 Oct 2022 12:17)  POCT Blood Glucose.: 217 mg/dL (05 Oct 2022 07:52)  POCT Blood Glucose.: 216 mg/dL (04 Oct 2022 22:03)  POCT Blood Glucose.: 183 mg/dL (04 Oct 2022 16:54)    I&O's Summary    04 Oct 2022 07:01  -  05 Oct 2022 07:00  --------------------------------------------------------  IN: 1044 mL / OUT: 0 mL / NET: 1044 mL    05 Oct 2022 07:01  -  05 Oct 2022 14:44  --------------------------------------------------------  IN: 960 mL / OUT: 0 mL / NET: 960 mL        PHYSICAL EXAM:  GENERAL: NAD, well-developed  HEAD:  Atraumatic, Normocephalic  EYES: EOMI, PERRLA, conjunctiva and sclera clear  NECK: Supple, No JVD  CHEST/LUNG: Clear to auscultation bilaterally; No wheeze  HEART: Regular rate and rhythm; No murmurs, rubs, or gallops  ABDOMEN: Soft, Nontender, Nondistended; Bowel sounds present  EXTREMITIES:  2+ Peripheral Pulses, No clubbing, cyanosis, or edema  PSYCH: AAOx3  NEUROLOGY: non-focal  SKIN: No rashes or lesions    LABS:                        10.0   5.84  )-----------( 213      ( 05 Oct 2022 06:43 )             31.2           PTT - ( 04 Oct 2022 14:56 )  PTT:44.6 sec    < from: TTE with Doppler (w/Cont) (10.03.22 @ 09:51) >  ------------------------------------------------------------------------  PROCEDURE: Transthoracic echocardiogram with 2-D, M-Mode  and complete spectral and color flow Doppler. Verbal  consent was obtained for injection of  Ultrasonic Enhancing  Agent following a discussion of risks and benefits.  Following intravenous injection of Ultrasonic Enhancing  Agent, harmonic imaging was performed.  INDICATION: ST elevation (STEMI) myocardial infarction of  unspecified site (I21.3)  ------------------------------------------------------------------------  Dimensions:    Normal Values:  LA:     3.6    2.0 - 4.0 cm  Ao:     3.5    2.0 - 3.8 cm  SEPTUM: 1.0    0.6 - 1.2 cm  PWT:    0.8    0.6 - 1.1 cm  LVIDd:  4.5    3.0 - 5.6 cm  LVIDs:  2.9    1.8 - 4.0 cm  Derived variables:  LVMI: 73 g/m2  RWT: 0.35  Fractional short: 36 %  EF (Visual Estimate): 40-45 %  ------------------------------------------------------------------------  Observations:  Mitral Valve: Normal mitral valve. Mild mitral  regurgitation.  Aortic Valve/Aorta: Normal trileaflet aortic valve. No  aortic valve regurgitation seen.  Aortic Root: 3.5 cm.  Left Atrium: Normal left atrium.  LA volume index = 21  cc/m2.  Left Ventricle: Endocardial visualization enhanced with  intravenous injection of Ultrasonic Enhancing Agent  (Definity).  Moderate  segmental left ventricular systolic  dysfunction. LVEF visually is 40-45%. Akinesis of the  inferior with hypokinesis of the inferolateral, septum and  anterolateral hypokinetic. Remaining walls are normal. No  left ventricular thrombus. Normal left ventricular internal  dimensions and wall thicknesses. Mild diastolic dysfunction  (Stage I).  Right Heart: Normal right atrium. Normal right ventricular  size and function. Normal tricuspid valve. No tricuspid  regurgitation. RVSP not calculated due to insufficient  Doppler signal. Normal pulmonic valve.  Pericardium/Pleura: No pericardial effusion seen.  Hemodynamic: Normal IVC.  ------------------------------------------------------------------------  Conclusions:  1. Normal mitral valve. Mild mitral regurgitation.  2. Normal left atrium.  LA volume index = 21 cc/m2.  Endocardial visualization enhanced with intravenous  injection of Ultrasonic Enhancing Agent (Definity).  Moderate segmental left ventricular systolic dysfunction.  LVEF visually is 40-45%. Akinesis of the inferior with  hypokinesis of the inferolateral, septum and anterolateral  hypokinetic. Remaining walls are normal. No left  ventricular thrombus.  4. Mild diastolic dysfunction (Stage I).  5. Normal right atrium.  6. Normal right ventricular size and function.  7. Normal tricuspid valve. No tricuspid regurgitation.RVSP  not calculated due to insufficient Doppler signal.  8. No pericardial effusion seen.  *** Compared with echocardiogram of 8/8/2022, there is a  decrease in LVEF and wall motion abnormalities are new.    < end of copied text >        RADIOLOGY & ADDITIONAL TESTS:    Imaging Personally Reviewed:    Consultant(s) Notes Reviewed:      Care Discussed with Consultants/Other Providers:

## 2022-10-05 NOTE — DISCHARGE NOTE PROVIDER - CARE PROVIDER_API CALL
Leonardo Alcaraz)  Cardiology  [address], [address]  [place] [address]  Phone: [phone]  Fax: [phone]  Follow Up Time: 1 week    Willard Loomis; YUKO; MS)  Neurosurgery  [address], [address]  [place] [address]  Phone: [phone]  Fax: [phone]  Follow Up Time: 1 week   Willard Loomis; YUKO; MS)  Neurosurgery  [address], [address]  [place] [address]  Phone: [phone]  Fax: [phone]  Follow Up Time: 1 week    Kimani Uribe)  Cardiovascular Disease  [address], [address]  [place] [address]  Phone: [phone]  Fax: [phone]  Follow Up Time: 1 week    Sudhakar Ramon)  Hematology; Medical Oncology  [address] [place] [address]  Phone: [phone]  Fax: [phone]  Follow Up Time: 1 week

## 2022-10-05 NOTE — DISCHARGE NOTE PROVIDER - NSDCHHCONTACT_GEN_ALL_CORE_FT
[address] Patient Status:  Inpatient   Age/Gender 76year old female MRN [id number]   Location [facility]. Attending Nereida Dudley MD   Hosp Day # 2 PCP Blayne Phipps MD       Anesthesia Post-op Note    Procedure(s):  ES As certified below, I, or a nurse practitioner or physician assistant working with me, had a face-to-face encounter that meets the physician face-to-face encounter requirements.

## 2022-10-05 NOTE — DISCHARGE NOTE PROVIDER - CARE PROVIDERS DIRECT ADDRESSES
,DirectAddress_Unknown,DirectAddress_Unknown ,DirectAddress_Unknown,DirectAddress_Unknown,[email or web address]

## 2022-10-05 NOTE — DISCHARGE NOTE NURSING/CASE MANAGEMENT/SOCIAL WORK - NSDCPEFALRISK_GEN_ALL_CORE
For information on Fall & Injury Prevention, visit: [email or web address] OR  [email or web address] OR  https://www.cdc.gov/steadi/patient.html

## 2022-10-05 NOTE — DISCHARGE NOTE PROVIDER - PROVIDER TOKENS
PROVIDER:[TOKEN:[[id number]],FOLLOWUP:[1 week]],PROVIDER:[TOKEN:[[id number]],FOLLOWUP:[1 week]] PROVIDER:[TOKEN:[[id number]],FOLLOWUP:[1 week]],PROVIDER:[TOKEN:[[id number]],FOLLOWUP:[1 week]],PROVIDER:[TOKEN:[[id number]],FOLLOWUP:[1 week]]

## 2022-10-05 NOTE — PROGRESS NOTE ADULT - PROVIDER SPECIALTY LIST ADULT
Pulmonology
Cardiology
Internal Medicine
Pulmonology
Internal Medicine
Internal Medicine
Cardiology
Internal Medicine

## 2022-10-05 NOTE — PROGRESS NOTE ADULT - SUBJECTIVE AND OBJECTIVE BOX
[facility] - [facility] - PROGRESS NOTE        INTERVAL HISTORY: head CT is without change on current A/C    REVIEW OF SYSTEMS:  Constitutional: As per interval history  HEENT: Within normal limits  CV: As per interval history  Resp: As per interval history  GI: Within normal limits   : Within normal limits  Musculoskeletal: Within normal limits  Skin: Within normal limits  Neurological: Within normal limits  Psychiatric: Within normal limits  Endocrine: Within normal limits  Hematologic/Lymphatic: Within normal limits  Allergic/Immunologic: Within normal limits    [ ] Unable to assess ROS because     MEDICATIONS:     Pulmonary "    Anti-microbials:    Cardiovascular:  metoprolol tartrate 25 milliGRAM(s) Oral two times a day    Other:  apixaban 2.5 milliGRAM(s) Oral two times a day  artificial  tears Solution 1 Drop(s) Both EYES three times a day  atorvastatin 40 milliGRAM(s) Oral at bedtime  chlorhexidine 2% Cloths 1 Application(s) Topical daily  dextrose 5%. 1000 milliLiter(s) IV Continuous <Continuous>  dextrose 5%. 1000 milliLiter(s) IV Continuous <Continuous>  dextrose 5%. 1000 milliLiter(s) IV Continuous <Continuous>  dextrose 50% Injectable 12.5 Gram(s) IV Push once  dextrose 50% Injectable 25 Gram(s) IV Push once  dextrose 50% Injectable 25 Gram(s) IV Push once  gabapentin 200 milliGRAM(s) Oral at bedtime  glucagon  Injectable 1 milliGRAM(s) IntraMuscular once  insulin glargine Injectable (LANTUS) 5 Unit(s) SubCutaneous at bedtime  insulin lispro (ADMELOG) corrective regimen sliding scale   SubCutaneous three times a day before meals  insulin lispro (ADMELOG) corrective regimen sliding scale   SubCutaneous at bedtime  levETIRAcetam 500 milliGRAM(s) Oral two times a day  pantoprazole    Tablet 40 milliGRAM(s) Oral before breakfast  polyethylene glycol 3350 17 Gram(s) Oral daily  senna 2 Tablet(s) Oral at bedtime    MEDICATIONS  (PRN):  acetaminophen     Tablet .. 650 milliGRAM(s) Oral every 6 hours PRN Temp greater or equal to 38C (100.4F), Mild Pain (1 - 3)  aluminum hydroxide/magnesium hydroxide/simethicone Suspension 30 milliLiter(s) Oral every 4 hours PRN Dyspepsia  dextrose Oral Gel 15 Gram(s) Oral once PRN Blood Glucose LESS THAN 70 milliGRAM(s)/deciliter  dextrose Oral Gel 15 Gram(s) Oral once PRN Blood Glucose LESS THAN 70 milliGRAM(s)/deciliter  melatonin 3 milliGRAM(s) Oral at bedtime PRN Insomnia  ondansetron Injectable 4 milliGRAM(s) IV Push every 8 hours PRN Nausea and/or Vomiting        OBJECTIVE:    I&O's Detail    04 Oct 2022 07:01  -  05 Oct 2022 07:00  --------------------------------------------------------  IN:    Heparin: 84 mL    Oral Fluid: 960 mL  Total IN: 1044 mL    OUT:  Total OUT: 0 mL    Total NET: 1044 mL      05 Oct 2022 07:01  -  05 Oct 2022 16:26  --------------------------------------------------------  IN:    Oral Fluid: 960 mL  Total IN: 960 mL    OUT:  Total OUT: 0 mL    Total NET: 960 mL          Daily     Daily Weight in k.3 (05 Oct 2022 07:50)    POCT Blood Glucose.: 304 mg/dL (05 Oct 2022 12:17)  POCT Blood Glucose.: 217 mg/dL (05 Oct 2022 07:52)  POCT Blood Glucose.: 216 mg/dL (04 Oct 2022 22:03)  POCT Blood Glucose.: 183 mg/dL (04 Oct 2022 16:54)      PHYSICAL EXAM:       ICU Vital Signs Last 24 Hrs  T(C): 36.6 (05 Oct 2022 11:03), Max: 36.6 (05 Oct 2022 11:03)  T(F): 97.9 (05 Oct 2022 11:03), Max: 97.9 (05 Oct 2022 11:03)  HR: 60 (05 Oct 2022 11:03) (60 - 77)  BP: 109/71 (05 Oct 2022 11:03) (109/71 - 136/75)  BP(mean): --  ABP: --  ABP(mean): --  RR: 18 (05 Oct 2022 11:03) (18 - 18)  SpO2: 96% (05 Oct 2022 11:03) (96% - 99%)    O2 Parameters below as of 05 Oct 2022 11:03  Patient On (Oxygen Delivery Method): room air           General: Awake. Alert. Cooperative. No distress. Appears stated age.	  HEENT: Atraumatic. Normocephalic. Anicteric. Normal oral mucosa. PERRL. EOMI.  Neck: Supple. Trachea midline. Thyroid without enlargement/tenderness/nodules. No carotid bruit. No JVD.	  Cardiovascular: Regular rate and rhythm. S1 S2 normal. No murmurs, rubs or gallops.  Respiratory: Respirations unlabored. Clear to auscultation and percussion bilaterally. No curvature.  Abdomen: Soft. Non-tender. Non-distended. No organomegaly. No masses. Normal bowel sounds.  Extremities: Warm to touch. No clubbing or cyanosis. No pedal edema.  Pulses: 2+ peripheral pulses all extremities.	  Skin: Normal skin color. No rashes or lesions. No ecchymoses. No cyanosis. Warm to touch.  Lymph Nodes: Cervical, supraclavicular and axillary nodes normal  Neurological: Motor and sensory examination equal and normal. A and O x 3  Psychiatry: Appropriate mood and affect.    LABS:                          10.0   5.84  )-----------( 213      ( 05 Oct 2022 06:43 )             31.2     CBC    WBC  5.84 <==, 5.83 <==, 5.73 <==, 7.60 <==, 6.22 <==, 8.98 <==    Hemoglobin  10.0 <<==, 9.5 <<==, 11.0 <<==, 11.3 <<==, 11.1 <<==, 12.4 <<==    Hematocrit  31.2 <==, 29.0 <==, 34.2 <==, 34.9 <==, 33.5 <==, 38.6 <==    Platelets  213 <==, 211 <==, 201 <==, 279 <==, 215 <==, 214 <==      140  |  106  |  12  ----------------------------<  244<H>    10-03  3.6   |  21<L>  |  0.78      LYTES    sodium  140 <==, 144 <==, 141 <==    potassium   3.6 <==, 4.0 <==, 3.4 <==, 4.0 <==    chloride  106 <==, 106 <==, 105 <==    carbon dioxide  21 <==, 21 <==, 16 <==    =============================================================================================  RENAL FUNCTION:    Creatinine:   0.78  <<==, 0.76  <<==, 0.74  <<==    BUN:   12 <==, 10 <==, 21 <==    ============================================================================================    calcium   8.7 <==, 8.4 <==, 8.7 <==    phos       mag   1.8 <==, 1.4 <==, .9 <==    ============================================================================================  LFTs    AST:   16 <==     ALT:  13  <==     AP:  76  <=    Bili:  0.3  <=      PT/INR - ( 03 Oct 2022 06:26 )   PT: 12.2 sec;   INR: 1.06 ratio         PTT - ( 04 Oct 2022 14:56 )  PTT:44.6 sec          Serum Pro-Brain Natriuretic Peptide: 1664 pg/mL ( @ 23:12)    CARDIAC MARKERS ( 03 Oct 2022 13:49 )  CPK 46 U/L /CKMB x     /CKMB Units 2.6 ng/mL    troponin 697 ng/L  CARDIAC MARKERS ( 02 Oct 2022 09:51 )   U/L /CKMB x     /CKMB Units 11.0 ng/mL    troponin 730 ng/L  CARDIAC MARKERS ( 01 Oct 2022 13:55 )  CPK x     /CKMB x     /CKMB Units 25.9 ng/mL    troponin 430 ng/L  CARDIAC MARKERS ( 01 Oct 2022 06:15 )   U/L /CKMB x     /CKMB Units 21.7 ng/mL    troponin 221 ng/L  CARDIAC MARKERS ( 01 Oct 2022 02:17 )  CPK x     /CKMB x     /CKMB Units x        troponin 120 ng/L  CARDIAC MARKERS ( 30 Sep 2022 23:12 )  CPK 77 U/L /CKMB x     /CKMB Units 5.6 ng/mL    troponin 64 ng/L      MICROBIOLOGY:       RADIOLOGY:  [x] Chest radiographs reviewed and interpreted by me    < from: CT Head No Cont (10.05.22 @ 08:16) >    ACC: [id number] EXAM:  CT BRAIN                          PROCEDURE DATE:  10/05/2022          INTERPRETATION:  Clinical indication: Headache. Metabolic encephalopathy.    Multiple axial sections were performed from base skull to vertex without   contrast enhancement. Coronal and sagittal destructions were performed as   well.    This exams compare prior head CT performed on 2022.    Postoperative changes compatible with a right temporal frontal craniotomy   is again seen. Area of encephalomalacia and gliosis in postop region is   again seen and unchanged.    Parenchymal volume loss and chronic microvascular ischemic changes are   identified and unchanged. Abnormal low-attenuation involving the medial   right frontal cortex is againseen and unchanged. This could be   compatible with a cystic neoplastic lesion. This finding is also   demonstrated on prior contrast enhanced brain MRI performed on 2022. This finding measures approximately 1.4 by by 1.0 cm.    Evaluation of the osseous appropriate window aside from postop changes   appears unremarkable.    The visualized paranasal sinuses mastoid and mastoid clear.    IMPRESSION: Stable exam.    PHILLY ROSENTHAL MD; Attending Radiologist  This document has been electronically signed. 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; chest pain; left vocal cord paralysis; NSTEMI    INTERVAL HISTORY: head CT is without change on current A/C; no shortness of breath or hypoxemia on room air; no cough, sputum production, hemoptysis, chest congestion or wheeze; no fevers, chills or sweats; no further chest pain/pressure or palpitations;     REVIEW OF SYSTEMS:  Constitutional: As per interval history  HEENT: Within normal limits  CV: As per interval history  Resp: As per interval history  GI: Within normal limits   : Within normal limits  Musculoskeletal: Within normal limits  Skin: Healing abrasion on the front of the scalp due to craniotomy dressing  Neurological: ataxia  Psychiatric: Within normal limits  Endocrine: Within normal limits  Hematologic/Lymphatic: lung cancer metastatic to brain  Allergic/Immunologic: immunosuppressed on chronic steroids    MEDICATIONS:     Pulmonary "    Anti-microbials:    Cardiovascular:  metoprolol tartrate 25 milliGRAM(s) Oral two times a day    Other:  apixaban 2.5 milliGRAM(s) Oral two times a day  artificial  tears Solution 1 Drop(s) Both EYES three times a day  atorvastatin 40 milliGRAM(s) Oral at bedtime  chlorhexidine 2% Cloths 1 Application(s) Topical daily  dextrose 5%. 1000 milliLiter(s) IV Continuous <Continuous>  dextrose 5%. 1000 milliLiter(s) IV Continuous <Continuous>  dextrose 5%. 1000 milliLiter(s) IV Continuous <Continuous>  dextrose 50% Injectable 12.5 Gram(s) IV Push once  dextrose 50% Injectable 25 Gram(s) IV Push once  dextrose 50% Injectable 25 Gram(s) IV Push once  gabapentin 200 milliGRAM(s) Oral at bedtime  glucagon  Injectable 1 milliGRAM(s) IntraMuscular once  insulin glargine Injectable (LANTUS) 5 Unit(s) SubCutaneous at bedtime  insulin lispro (ADMELOG) corrective regimen sliding scale   SubCutaneous three times a day before meals  insulin lispro (ADMELOG) corrective regimen sliding scale   SubCutaneous at bedtime  levETIRAcetam 500 milliGRAM(s) Oral two times a day  pantoprazole    Tablet 40 milliGRAM(s) Oral before breakfast  polyethylene glycol 3350 17 Gram(s) Oral daily  senna 2 Tablet(s) Oral at bedtime    MEDICATIONS  (PRN):  acetaminophen     Tablet .. 650 milliGRAM(s) Oral every 6 hours PRN Temp greater or equal to 38C (100.4F), Mild Pain (1 - 3)  aluminum hydroxide/magnesium hydroxide/simethicone Suspension 30 milliLiter(s) Oral every 4 hours PRN Dyspepsia  dextrose Oral Gel 15 Gram(s) Oral once PRN Blood Glucose LESS THAN 70 milliGRAM(s)/deciliter  dextrose Oral Gel 15 Gram(s) Oral once PRN Blood Glucose LESS THAN 70 milliGRAM(s)/deciliter  melatonin 3 milliGRAM(s) Oral at bedtime PRN Insomnia  ondansetron Injectable 4 milliGRAM(s) IV Push every 8 hours PRN Nausea and/or Vomiting        OBJECTIVE:    Daily Weight in k.3 (05 Oct 2022 07:50)    POCT Blood Glucose.: 304 mg/dL (05 Oct 2022 12:17)  POCT Blood Glucose.: 217 mg/dL (05 Oct 2022 07:52)  POCT Blood Glucose.: 216 mg/dL (04 Oct 2022 22:03)  POCT Blood Glucose.: 183 mg/dL (04 Oct 2022 16:54)      PHYSICAL EXAM:       ICU Vital Signs Last 24 Hrs  T(C): 36.6 (05 Oct 2022 11:03), Max: 36.6 (05 Oct 2022 11:03)  T(F): 97.9 (05 Oct 2022 11:03), Max: 97.9 (05 Oct 2022 11:03)  HR: 60 (05 Oct 2022 11:03) (60 - 77)  BP: 109/71 (05 Oct 2022 11:03) (109/71 - 136/75)  BP(mean): --  ABP: --  ABP(mean): --  RR: 18 (05 Oct 2022 11:03) (18 - 18)  SpO2: 96% (05 Oct 2022 11:03) (96% - 99%) on room air    General: Awake. Alert. Cooperative. No distress. Appears stated age. Sitting up in bed  HEENT:  Atraumatic. Normocephalic. Anicteric. Normal oral mucosa. PERRL. EOMI. Partial. alopecia.  Neck: Supple. Trachea midline. Thyroid without enlargement/tenderness/nodules. No carotid bruit. No JVD.	  Cardiovascular: Regular rate and rhythm. S1 S2 normal. No murmurs, rubs or gallops.  Respiratory: Respirations unlabored. Right sided rales. No curvature.  Abdomen: Soft. Non-tender. Non-distended. No organomegaly. No masses. Normal bowel sounds.  Extremities: Warm to touch. No clubbing or cyanosis. No pedal edema.   Pulses: 2+ peripheral pulses all extremities.	  Skin: Healed craniotomy. Forehead ulcerations at the site of the craniotomy dressing now healed or crusted.  Lymph Nodes: Cervical, supraclavicular and axillary nodes normal  Neurological: Ataxia. Upper extremity intention tremor. A and O x 3.  Psychiatry: Appropriate mood and affect.    LABS:                          10.0   5.84  )-----------( 213      ( 05 Oct 2022 06:43 )             31.2     CBC    WBC  5.84 <==, 5.83 <==, 5.73 <==, 7.60 <==, 6.22 <==, 8.98 <==    Hemoglobin  10.0 <<==, 9.5 <<==, 11.0 <<==, 11.3 <<==, 11.1 <<==, 12.4 <<==    Hematocrit  31.2 <==, 29.0 <==, 34.2 <==, 34.9 <==, 33.5 <==, 38.6 <==    Platelets  213 <==, 211 <==, 201 <==, 279 <==, 215 <==, 214 <==      140  |  106  |  12  ----------------------------<  244<H>    10-03  3.6   |  21<L>  |  0.78      LYTES    sodium  140 <==, 144 <==, 141 <==    potassium   3.6 <==, 4.0 <==, 3.4 <==, 4.0 <==    chloride  106 <==, 106 <==, 105 <==    carbon dioxide  21 <==, 21 <==, 16 <==    =============================================================================================  RENAL FUNCTION:    Creatinine:   0.78  <<==, 0.76  <<==, 0.74  <<==    BUN:   12 <==, 10 <==, 21 <==    ============================================================================================    calcium   8.7 <==, 8.4 <==, 8.7 <==    phos       mag   1.8 <==, 1.4 <==, .9 <==    ============================================================================================  LFTs    AST:   16 <==     ALT:  13  <==     AP:  76  <=    Bili:  0.3  <=      PT/INR - ( 03 Oct 2022 06:26 )   PT: 12.2 sec;   INR: 1.06 ratio       PTT - ( 04 Oct 2022 14:56 )  PTT:44.6 sec    Serum Pro-Brain Natriuretic Peptide: 1664 pg/mL ( @ 23:12)    CARDIAC MARKERS ( 03 Oct 2022 13:49 )  CPK 46 U/L /CKMB x     /CKMB Units 2.6 ng/mL    troponin 697 ng/L    CARDIAC MARKERS ( 02 Oct 2022 09:51 )   U/L /CKMB x     /CKMB Units 11.0 ng/mL    troponin 730 ng/L    CARDIAC MARKERS ( 01 Oct 2022 13:55 )  CPK x     /CKMB x     /CKMB Units 25.9 ng/mL    troponin 430 ng/L    CARDIAC MARKERS ( 01 Oct 2022 06:15 )   U/L /CKMB x     /CKMB Units 21.7 ng/mL    troponin 221 ng/L    CARDIAC MARKERS ( 01 Oct 2022 02:17 )  CPK x     /CKMB x     /CKMB Units x        troponin 120 ng/L    CARDIAC MARKERS ( 30 Sep 2022 23:12 )  CPK 77 U/L /CKMB x     /CKMB Units 5.6 ng/mL    troponin 64 ng/L    < from: TTE with Doppler (w/Cont) (10.03. @ 09:51) >    Patient name: LANIE BERTRAND  YOB: 1940   Age: 82 (F)   MR#: [id number]  Study Date: 10/3/2022  ------------------------------------------------------------------------  Dimensions:    Normal Values:  LA:     3.6    2.0 - 4.0 cm  Ao:     3.5    2.0 - 3.8 cm  SEPTUM: 1.0    0.6 - 1.2 cm  PWT:    0.8    0.6 - 1.1 cm  LVIDd:  4.5    3.0 - 5.6 cm  LVIDs:  2.9    1.8 - 4.0 cm  Derived variables:  LVMI: 73 g/m2  RWT: 0.35  Fractional short: 36 %  EF (Visual Estimate): 40-45 %  ------------------------------------------------------------------------  Conclusions:  1. Normal mitral valve. Mild mitral regurgitation.  2. Normal left atrium.  LA volume index = 21 cc/m2.  Endocardial visualization enhanced with intravenous  injection of Ultrasonic Enhancing Agent (Definity).  Moderate segmental left ventricular systolic dysfunction.  LVEF visually is 40-45%. Akinesis of the inferior with  hypokinesis of the inferolateral, septum and anterolateral  hypokinetic. Remaining walls are normal. No left  ventricular thrombus.  4. Mild diastolic dysfunction (Stage I).  5. Normal right atrium.  6. Normal right ventricular size and function.  7. Normal tricuspid valve. No tricuspid regurgitation.RVSP  not calculated due to insufficient Doppler signal.  8. No pericardial effusion seen.  *** Compared with echocardiogram of 2022, there is a  decrease in LVEF and wall motion abnormalities are new.  ------------------------------------------------------------------------  Confirmed on  10/3/2022 - 17:14:47 by Mariposa Jose M.D.  ------------------------------------------------------------------------  ---------------------------------------------------------------------------------------------------------------  MICROBIOLOGY:     COVID-19 PCR . (10.01.22 @ 02:36)   COVID-19 PCR: [facility]    RADIOLOGY:  [x] Chest radiographs reviewed and interpreted by me    EXAM:  CT BRAIN                          PROCEDURE DATE:  10/05/2022      INTERPRETATION:  Clinical indication: Headache. Metabolic encephalopathy.    Multiple axial sections were performed from base skull to vertex without   contrast enhancement. Coronal and sagittal destructions were performed as   well.    This exams compare prior head CT performed on 2022.    Postoperative changes compatible with a right temporal frontal craniotomy   is again seen. Area of encephalomalacia and gliosis in postop region is   again seen and unchanged.    Parenchymal volume loss and chronic microvascular ischemic changes are   identified and unchanged. Abnormal low-attenuation involving the medial   right frontal cortex is againseen and unchanged. This could be   compatible with a cystic neoplastic lesion. This finding is also   demonstrated on prior contrast enhanced brain MRI performed on 2022. This finding measures approximately 1.4 by by 1.0 cm.    Evaluation of the osseous appropriate window aside from postop changes   appears unremarkable.    The visualized paranasal sinuses mastoid and mastoid clear.    IMPRESSION: Stable exam.    PHILLY ROSENTHAL MD; Attending Radiologist  This document has been electronically signed. Oct  5 2022  8:29AM  ---------------------------------------------------------------------------------------------------------------    EXAM:  CT BRAIN                          PROCEDURE DATE:  10/01/2022      IMPRESSION:  Ventriculomegaly compared to the prior study, which likely reflects   partial resolution of previously seen mass effect secondary to   postoperative edema on 8/3/2022. Attention on follow-up is advised as   these findings can reflect some degree of communicating hydrocephalus   (i.e. normal pressure hydrocephalus).    Postsurgical changes of the right temporal lobe with interval decrease in   vasogenic edema in this region. 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    No acute intracranial hemorrhage or mass effect.    KATIE MEADE MD; Resident Radiologist  This documenthas been electronically signed.   KRISTOPHER DUNNE MD; Attending Radiologist  This document has been electronically signed. Oct  1 2022  1:39AM  ---------------------------------------------------------------------------------------------------------------  [facility] ONC FDG INIT  - ORDERED BY: BURTON KILGORE    PROCEDURE DATE:  2022       OTHER STUDIES USED FOR CORRELATION: CTA of chest dated 8/3/2022 and CT   head dated 8/3/2022.    FINDINGS:    HEAD/NECK: Right temporal craniotomy again noted. Mild FDG activity in   overlying scalp is compatible with recent surgery. Photopenia in right   temporal lobe corresponding to area of hypoattenuation suggestive of   edema and/or encephalomalacia/postsurgical changes. Previously noted   lesion in left medial frontal lobe is not imaged. No focus of increased   FDG activity within the visualized portions of the brain.    Asymmetric FDG activity in the vocal cords, present on the right, and   absent on the left, compatible with left vocal cord paralysis. No   enlarged or FDG-avid cervical lymph node.    CHEST: No FDG-avid mediastinal or hilar lymph node.    LUNGS: FDG-avid left upper lobe mass abutting mediastinum at the level of   the AP window measures 2.7 x 2.1 cm, SUV 9.6 (image 71), unchanged as   compared to CT dated 8/3/2022. Atelectasis along the mediastinum, also is   unchanged.    PLEURA/PERICARDIUM: No abnormal FDG activity. No effusion.    HEPATOBILIARY/PANCREAS:  Physiologic FDG activity. Liver SUV mean is 3.0.    SPLEEN: Physiologic FDG activity. Normal in size.    ADRENAL GLANDS: No abnormal FDG activity. No nodule.    KIDNEYS/URINARY BLADDER: Physiologic excreted FDG activity.    REPRODUCTIVE ORGANS: No abnormal FDG activity.    ABDOMINOPELVIC NODES: No enlarged or FDG-avid lymph node.    BOWEL/PERITONEUM/MESENTERY: Diffuse small bowel and pancolonic   hypermetabolism, without corresponding abnormalities on CT, likely is   related to metformin.    BONES/SOFT TISSUES: No abnormal FDG activity. Right shoulder prosthesis.   Nonspecific, mildly increased radiotracer activity in right greater than   left glenohumeral joints.    IMPRESSION: Abnormal FDG-PET/CT scan.    1. FDG-avid left upper lobe lung mass abutting the mediastinum at level   of AP window, with associated atelectasis along the mediastinum,   unchanged as compared to CT dated 8/3/2022, corresponds to patient's   known adenocarcinoma.    2. Right temporal craniotomy with photopenic low attenuation within right   temporal lobe, compatible with vasogenic edema, gliosis, and/or   postsurgical changes, better evaluated on contrast-enhanced CT or MRI.    3. Findings suggestive of left vocal cord paralysis, likely secondary to   left upper lobe lung mass. Please correlate clinically and with direct   visualization, as indicated.    4. Nonspecific hypermetabolism in small bowel and colon likely is related   to metformin.    5. No scan evidence of metastatic disease.    JENNIFER BENEDICT MD; Attending Nuclear Medicine   This document has been electronically signed. Sep 11 2022  7:13AM  ---------------------------------------------------------------------------------------------------------------

## 2022-10-06 ENCOUNTER — APPOINTMENT (OUTPATIENT)
Dept: PHYSICAL MEDICINE AND REHAB | Facility: CLINIC | Age: 82
End: 2022-10-06

## 2022-10-06 NOTE — ASSESSMENT
[FreeTextEntry1] : Metastatic NSCLC with adenocarcinoma histology; tumor tested PDL1 Negative and lacks actionable mutations but tested TMB-High.  Recommend:\par – Proceed with first-line systemic therapy with single agent Pembrolizumab 200mg IV q3 weeks; she was previously consented for this agent.  Will aim to start after the [employer] holidays during the week of 10/10\par – F/U with Radiation oncology s/p GK-SRS to brain metastases \par – Established care with Palliative Care \par – Can avoid taking the previously prescribed antiemetic and prophylactic medications as we will be proceeding with single agent Pembro\par – Discussed that systemic therapy would be ongoing, for as long as it benefited the patient or unless she developed intolerable side effects.  Would plan to obtain a restaging scan following 3 cycles of systemic therapy to assess response.\par – PE: Patient is currently on prophylactic anticoagulation with enoxaparin.  Await clearance from neurosurgery for full dose therapeutic anticoagulation.  Would plan to continue anticoagulation indefinitely given her active metastatic malignancy.\par – Continue follow-up and management with her PCP/cardiologist for her other ongoing medical problems and comorbidities.\par – I will defer to Neurosurgery with regards to her antiseizure medications.  The patient may benefit from neurology or neuro–oncology consultation for management of these medications, and that referral can be made by them, if appropriate.\par – F/U pre-treatment with each cycle beginning with C2\par All questions answered to their apparent satisfaction

## 2022-10-06 NOTE — RESULTS/DATA
[FreeTextEntry1] : -CT Brain 7/18/22:  Cystic versus a necrotic masses measure approximately 4.8 x 3.2 x 3.2 cm in the right temporal lobe and 1.4 x 1.3 x 1.6 cm in the medial left frontal lobe.  Moderate to large amount of vasogenic edema in the right frontal, parietal and temporal lobes. Trace edema in the left frontal lobe.  Regional mass effect in the right cerebral hemisphere with partial effacement of the ventricular system and 5 mm subfalcine herniation of the right frontal lobe underneath the cerebral falx.  The findings may represent intracranial metastatic disease versus glioblastoma. Contrast-enhanced brain MRI is recommended for further evaluation.\par \par -CT C/A/P 7/18/22:  Left upper lobe lung mass with partial atelectasis of the left upper lobe, concerning for primary lung neoplasm. Few scattered sub-4 mm pulmonary nodules, indeterminate.  Indeterminant left adrenal nodule for which contrast enhanced MRI is recommended for further evaluation.\par \par -MRI Brain 7/20/22:  Right temporal lobe peripherally enhancing, centrally necrotic 3.1 x 2.6 cm mass demonstrating mild diffusion restriction. There is surrounding vasogenic edema. 8 mm leftward midline shift. Right uncal herniation. Additional left parafalcine 1.3 x 0.9 cm rim-enhancing cystic lesion with mild surrounding vasogenic edema. Tiny 0.3 x 0.3 cm rim-enhancing lesion within the left frontal cortex. Findings are concerning for metastatic disease in the context of patient's known primary lung cancer. The left lateral ventricle is mildly dilated.\par \par -LE Dopplers 7/24/22:  No evidence of deep venous thrombosis in either lower extremity.\par -LE Dopplers 7/28/22:  No evidence of deep venous thrombosis in either lower extremity.\par \par -CT Chest Angio 8/3/22:  Pulmonary embolism in the lateral segmental branch of the right middle lobe. No evidence of right heart strain.  Interval decrease in left upper lobe mass.  Unchanged obstruction of the posterior bronchus, and stable fibroglandular lymph node.\par \par -Abdominal U/S 8/4/22:   Mildly distended gallbladder. No evidence of acute cholecystitis.  Possible mild hydronephrosis.\par \par -LE Dopplers 8/7/22:  No evidence of deep venous thrombosis in either lower extremity.\par \par Images Reviewed/Interpreted:\par \par -LE Dopplers 9/1/22:  No evidence of deep venous thrombosis in either lower extremity.\par \par -PET/CT 9/9/22:  Abnormal FDG-PET/CT scan.\par 1. FDG-avid left upper lobe lung mass abutting the mediastinum at level of AP window, with associated atelectasis along the mediastinum, unchanged as compared to CT dated 8/3/2022, corresponds to patient's known adenocarcinoma.\par 2. Right temporal craniotomy with photopenic low attenuation within right temporal lobe, compatible with vasogenic edema, gliosis, and/or postsurgical changes, better evaluated on contrast-enhanced CT or MRI.\par 3. Findings suggestive of left vocal cord paralysis, likely secondary to left upper lobe lung mass. Please correlate clinically and with direct visualization, as indicated.\par 4. Nonspecific hypermetabolism in small bowel and colon likely is related to metformin.\par 5. No scan evidence of metastatic disease.\par \par

## 2022-10-06 NOTE — HISTORY OF PRESENT ILLNESS
[Disease: _____________________] : Disease: [unfilled] [AJCC Stage: ____] : AJCC Stage: [unfilled] [de-identified] : 82F former smoker with multiple comorbidities was undergoing orthopedic evaluation as an outpatient due to ongoing chronic back pain and gait imbalance.  She ultimately presented to [facility] ED on 7/19/2022.  Brain imaging demonstrated evidence of metastatic disease including a dominant right temporal lobe 3.1 cm enhancing mass with vasogenic edema.  CT scan of her body demonstrated a ANTHONY tumor with subcentimeter pulmonary nodules and a nonspecific adrenal nodule.  Bronchoscopy with biopsy performed on 7/20/2022 demonstrated adenocarcinoma consistent with lung primary; tumor tested PD-L1 negative and was negative for actionable mutations.  She underwent craniotomy resection of the dominant brain mass on 7/27/2022 with pathology revealing the same findings.  Hospital course was complicated by NSTEMI, A. fib with RVR and RML PE.  She was put on medications for seizure prophylaxis.  She was put on anticoagulation and currently remains on prophylactic dosing of anticoagulation with enoxaparin 40 mg subcu daily, pending clearance for therapeutic anticoagulation by neurosurgery.  She was subsequently discharged to acute rehab on 8/10–8/26/2022.  Treated with GK-SRS to brain metastases in Sept 2022.   [de-identified] : – Left mainstem bronchus biopsy 7/20/2022: Poorly differentiated adenocarcinoma of lung origin.\par PD-L1 negative/0%.\par Foundation One:  No actionable mutations (TP53+, among others); TMB-High.  \par –Right brain tumor resection 7/27/2022: Metastatic non-small cell carcinoma, favor adenocarcinoma, consistent with lung origin.\par PD-L1 negative. [de-identified] : She continues off steroids.  She is status post GK–SRS in 3 fractions earlier this month.  Functional status has continued to improve.  She continues to be very concerned about side effects from chemotherapy.\par \par Initial PET CT scan outlined below.

## 2022-10-06 NOTE — PHYSICAL EXAM
[Ambulatory and capable of all self care but unable to carry out any work activities] : Status 2- Ambulatory and capable of all self care but unable to carry out any work activities. Up and about more than 50% of waking hours [Normal] : affect appropriate [de-identified] : Elderly woman in wheelchair  [de-identified] : No icterus  [de-identified] : MMM O/P Clear  [de-identified] : Supple No LAD  [de-identified] : Clear  [de-identified] : S1 S2 [de-identified] : LE 1+ pitting edema  [de-identified] : Soft NT [de-identified] : No spine tenderness

## 2022-10-07 ENCOUNTER — NON-APPOINTMENT (OUTPATIENT)
Age: 82
End: 2022-10-07

## 2022-10-07 ENCOUNTER — APPOINTMENT (OUTPATIENT)
Age: 82
End: 2022-10-07

## 2022-10-07 DIAGNOSIS — I10 ESSENTIAL (PRIMARY) HYPERTENSION: ICD-10-CM

## 2022-10-07 DIAGNOSIS — E11.9 TYPE 2 DIABETES MELLITUS W/OUT COMPLICATIONS: ICD-10-CM

## 2022-10-07 DIAGNOSIS — I25.10 ATHEROSCLEROTIC HEART DISEASE OF NATIVE CORONARY ARTERY W/OUT ANGINA PECTORIS: ICD-10-CM

## 2022-10-07 PROCEDURE — 99495 TRANSJ CARE MGMT MOD F2F 14D: CPT | Mod: 95

## 2022-10-07 RX ORDER — LEVETIRACETAM 500 MG/1
500 TABLET, FILM COATED ORAL TWICE DAILY
Refills: 0 | Status: DISCONTINUED | COMMUNITY
Start: 2022-09-06 | End: 2022-10-07

## 2022-10-07 RX ORDER — METOPROLOL TARTRATE 25 MG/1
25 TABLET, FILM COATED ORAL TWICE DAILY
Refills: 0 | Status: ACTIVE | COMMUNITY

## 2022-10-07 RX ORDER — DIVALPROEX SODIUM 500 MG/1
500 TABLET, DELAYED RELEASE ORAL
Refills: 0 | Status: DISCONTINUED | COMMUNITY
End: 2022-10-07

## 2022-10-07 NOTE — REVIEW OF SYSTEMS
[Fatigue] : fatigue [Back Pain] : back pain [Negative] : Genitourinary [Fever] : no fever [Chills] : no chills

## 2022-10-07 NOTE — HISTORY OF PRESENT ILLNESS
[Home] : at home, [unfilled] , at the time of the visit. [Other Location: e.g. Home (Enter Location, City,State)___] : at [unfilled] [Verbal consent obtained from patient] : the patient, [unfilled] [FreeTextEntry1] : F/u post hospital discharge STAR NSTEMI. Patient was contacted by TCM RN on 10/6 for 24 hour call and documentation is present in the Clinical viewer.  [de-identified] : Ms. Phan is a 81 yo female with PMH of HTN, DM, HLD, CAD, MI, PAD, peripheral neuropathy, former smoker, NSCLC with ANTHONY primary tumor and brain metastasis s/p right craniotomy/resection on 7/27/22 and post-op RT, hospital course c/b STEMI, metabolic encephalopathy, new onset afib, RML PE and completed rehab course at [place]. Presents to [facility] with acute onset of chest pain and admitted from 10/1-10/5. Recent STEMI in 8/2022 treated medically in setting of recent craniotomy, ECG without significant changes. Echo 8/22 with EF 60-65%, repeat EF 40-45% with left ventricular systolic dysfunction. Chest pain resolved and pt started on low dose eliquis with plan for outpatient chemo.\par \par Ms. Phan is seen via telecommunication video visit, A&O x 3 and in nad. Collateral information provided by 24/7 SARA Lanza and son Dick [phone]. Since discharge to home, feeling okay, FS today 116. Reports trouble clearing throat after taking medications, not new and states she is taking 4-5 pills at a time. Denies any coughing or aspiration events during meals or liquids. Denies any fever/chills, SOB, CP, numbness/tingling or worsening edema. Pt educated on aspiration precautions and advised to take 1 pill at a time, can crush if warranted. Medications and discharge instructions reviewed with positive teach back. She has Chemo scheduled for 10/11, Heme 11/1, neuro sx 11/4  and advised for sooner f/u with providers. Declined TCM assistance with appt scheduling, son Dick to coordinate. Has home services with [facility], referral sent for PT, OT and speech. Pt and family offers no other concerns at this time. TCM contact provided. \par \par PMD/Cards: Dr. Uribe\par Neuro sx: Dr. Loomis\par Heme/ONC: Dr. Ramon\par Endo: Dr. Richardson

## 2022-10-07 NOTE — PHYSICAL EXAM
[No Acute Distress] : no acute distress [Normal Sclera/Conjunctiva] : normal sclera/conjunctiva [Normal Outer Ear/Nose] : the outer ears and nose were normal in appearance [No Respiratory Distress] : no respiratory distress  [No Accessory Muscle Use] : no accessory muscle use [Normal Affect] : the affect was normal [Alert and Oriented x3] : oriented to person, place, and time [de-identified] : Physical exam limited d/t telehealth encounter

## 2022-10-07 NOTE — PLAN
[FreeTextEntry1] : [employer] TCM STARS teaching: Adhere to low salt, low fat diet and educated patient on foods that should be avoided such as processed or fast food. Continue medications as ordered. Follow up with Cardiologist,Oncologist and neuro sx. Contact information given, patient advised to call with any questions/concerns.

## 2022-10-11 ENCOUNTER — NON-APPOINTMENT (OUTPATIENT)
Age: 82
End: 2022-10-11

## 2022-10-11 ENCOUNTER — RESULT REVIEW (OUTPATIENT)
Age: 82
End: 2022-10-11

## 2022-10-11 ENCOUNTER — APPOINTMENT (OUTPATIENT)
Dept: INFUSION THERAPY | Facility: HOSPITAL | Age: 82
End: 2022-10-11

## 2022-10-11 LAB
IRON SATN MFR SERPL: 10 % — LOW (ref 14–50)
IRON SATN MFR SERPL: 33 UG/DL — SIGNIFICANT CHANGE UP (ref 30–160)
TIBC SERPL-MCNC: 340 UG/DL — SIGNIFICANT CHANGE UP (ref 220–430)
UIBC SERPL-MCNC: 307 UG/DL — SIGNIFICANT CHANGE UP (ref 110–370)

## 2022-10-12 ENCOUNTER — NON-APPOINTMENT (OUTPATIENT)
Age: 82
End: 2022-10-12

## 2022-10-12 DIAGNOSIS — C34.12 MALIGNANT NEOPLASM OF UPPER LOBE, LEFT BRONCHUS OR LUNG: ICD-10-CM

## 2022-10-12 DIAGNOSIS — Z51.11 ENCOUNTER FOR ANTINEOPLASTIC CHEMOTHERAPY: ICD-10-CM

## 2022-10-12 LAB
FERRITIN SERPL-MCNC: 35 NG/ML — SIGNIFICANT CHANGE UP (ref 15–150)
FOLATE SERPL-MCNC: 19.5 NG/ML — SIGNIFICANT CHANGE UP
HBV CORE AB SER-ACNC: SIGNIFICANT CHANGE UP
HBV SURFACE AB SER-ACNC: SIGNIFICANT CHANGE UP
HBV SURFACE AG SER-ACNC: SIGNIFICANT CHANGE UP
HCV AB S/CO SERPL IA: 0.08 S/CO — SIGNIFICANT CHANGE UP (ref 0–0.99)
HCV AB SERPL-IMP: SIGNIFICANT CHANGE UP
T4 FREE SERPL-MCNC: 1.4 NG/DL — SIGNIFICANT CHANGE UP (ref 0.9–1.8)
T4 FREE+ TSH PNL SERPL: 0.21 UIU/ML — LOW (ref 0.27–4.2)
VIT B12 SERPL-MCNC: 518 PG/ML — SIGNIFICANT CHANGE UP (ref 232–1245)

## 2022-10-13 ENCOUNTER — NON-APPOINTMENT (OUTPATIENT)
Age: 82
End: 2022-10-13

## 2022-10-20 PROCEDURE — 90662 IIV NO PRSV INCREASED AG IM: CPT

## 2022-10-20 PROCEDURE — 82553 CREATINE MB FRACTION: CPT

## 2022-10-20 PROCEDURE — 82947 ASSAY GLUCOSE BLOOD QUANT: CPT

## 2022-10-20 PROCEDURE — 83036 HEMOGLOBIN GLYCOSYLATED A1C: CPT

## 2022-10-20 PROCEDURE — 84132 ASSAY OF SERUM POTASSIUM: CPT

## 2022-10-20 PROCEDURE — U0003: CPT

## 2022-10-20 PROCEDURE — 83880 ASSAY OF NATRIURETIC PEPTIDE: CPT

## 2022-10-20 PROCEDURE — 85610 PROTHROMBIN TIME: CPT

## 2022-10-20 PROCEDURE — 36415 COLL VENOUS BLD VENIPUNCTURE: CPT

## 2022-10-20 PROCEDURE — 85018 HEMOGLOBIN: CPT

## 2022-10-20 PROCEDURE — 82435 ASSAY OF BLOOD CHLORIDE: CPT

## 2022-10-20 PROCEDURE — 80053 COMPREHEN METABOLIC PANEL: CPT

## 2022-10-20 PROCEDURE — 84484 ASSAY OF TROPONIN QUANT: CPT

## 2022-10-20 PROCEDURE — 93005 ELECTROCARDIOGRAM TRACING: CPT

## 2022-10-20 PROCEDURE — 99285 EMERGENCY DEPT VISIT HI MDM: CPT | Mod: 25

## 2022-10-20 PROCEDURE — 82330 ASSAY OF CALCIUM: CPT

## 2022-10-20 PROCEDURE — 85025 COMPLETE CBC W/AUTO DIFF WBC: CPT

## 2022-10-20 PROCEDURE — 97161 PT EVAL LOW COMPLEX 20 MIN: CPT

## 2022-10-20 PROCEDURE — 85014 HEMATOCRIT: CPT

## 2022-10-20 PROCEDURE — C8929: CPT

## 2022-10-20 PROCEDURE — 80048 BASIC METABOLIC PNL TOTAL CA: CPT

## 2022-10-20 PROCEDURE — 82962 GLUCOSE BLOOD TEST: CPT

## 2022-10-20 PROCEDURE — 83735 ASSAY OF MAGNESIUM: CPT

## 2022-10-20 PROCEDURE — 83605 ASSAY OF LACTIC ACID: CPT

## 2022-10-20 PROCEDURE — 82550 ASSAY OF CK (CPK): CPT

## 2022-10-20 PROCEDURE — U0005: CPT

## 2022-10-20 PROCEDURE — 84295 ASSAY OF SERUM SODIUM: CPT

## 2022-10-20 PROCEDURE — 70450 CT HEAD/BRAIN W/O DYE: CPT

## 2022-10-20 PROCEDURE — 82803 BLOOD GASES ANY COMBINATION: CPT

## 2022-10-20 PROCEDURE — 85730 THROMBOPLASTIN TIME PARTIAL: CPT

## 2022-10-20 PROCEDURE — 85027 COMPLETE CBC AUTOMATED: CPT

## 2022-10-24 NOTE — ED PROVIDER NOTE - OBJECTIVE STATEMENT
[id number]
83 y/o F PMH HTN, DM, HLD, CAD, MI, PAD, peripheral neuropathy, former smoker presents with headache and bilateral chest pain x one day. Patient states she took Tylenol for pain with minimal relief. Of note, patient recently admitted on 7/19/22 with brain mass s/p Right craniotomy/resection 7/27/22. Hospital course complicated with STEMI and metabolic encephalopathy 2/2 UTI, new onset afib now on eliquis.

## 2022-10-25 ENCOUNTER — OUTPATIENT (OUTPATIENT)
Dept: OUTPATIENT SERVICES | Facility: HOSPITAL | Age: 82
LOS: 1 days | Discharge: HOME | End: 2022-10-25

## 2022-10-25 DIAGNOSIS — C34.90 MALIGNANT NEOPLASM OF UNSPECIFIED PART OF UNSPECIFIED BRONCHUS OR LUNG: ICD-10-CM

## 2022-10-25 DIAGNOSIS — I25.10 ATHEROSCLEROTIC HEART DISEASE OF NATIVE CORONARY ARTERY WITHOUT ANGINA PECTORIS: Chronic | ICD-10-CM

## 2022-10-25 DIAGNOSIS — I73.9 PERIPHERAL VASCULAR DISEASE, UNSPECIFIED: Chronic | ICD-10-CM

## 2022-10-31 ENCOUNTER — APPOINTMENT (OUTPATIENT)
Dept: GERIATRICS | Facility: CLINIC | Age: 82
End: 2022-10-31

## 2022-10-31 PROCEDURE — 99442: CPT | Mod: 95

## 2022-11-01 ENCOUNTER — RESULT REVIEW (OUTPATIENT)
Age: 82
End: 2022-11-01

## 2022-11-01 ENCOUNTER — APPOINTMENT (OUTPATIENT)
Dept: HEMATOLOGY ONCOLOGY | Facility: CLINIC | Age: 82
End: 2022-11-01

## 2022-11-01 ENCOUNTER — APPOINTMENT (OUTPATIENT)
Dept: INFUSION THERAPY | Facility: HOSPITAL | Age: 82
End: 2022-11-01

## 2022-11-01 VITALS
DIASTOLIC BLOOD PRESSURE: 69 MMHG | SYSTOLIC BLOOD PRESSURE: 105 MMHG | WEIGHT: 163.8 LBS | RESPIRATION RATE: 16 BRPM | BODY MASS INDEX: 29.96 KG/M2 | TEMPERATURE: 97.7 F

## 2022-11-01 VITALS — HEART RATE: 107 BPM | OXYGEN SATURATION: 98 %

## 2022-11-01 LAB
ALBUMIN SERPL ELPH-MCNC: 4.1 G/DL — SIGNIFICANT CHANGE UP (ref 3.3–5)
ALP SERPL-CCNC: 112 U/L — SIGNIFICANT CHANGE UP (ref 40–120)
ALT FLD-CCNC: 6 U/L — LOW (ref 10–45)
ANION GAP SERPL CALC-SCNC: 12 MMOL/L — SIGNIFICANT CHANGE UP (ref 5–17)
AST SERPL-CCNC: 8 U/L — LOW (ref 10–40)
BILIRUB SERPL-MCNC: 0.2 MG/DL — SIGNIFICANT CHANGE UP (ref 0.2–1.2)
BUN SERPL-MCNC: 23 MG/DL — SIGNIFICANT CHANGE UP (ref 7–23)
CALCIUM SERPL-MCNC: 9.7 MG/DL — SIGNIFICANT CHANGE UP (ref 8.4–10.5)
CHLORIDE SERPL-SCNC: 103 MMOL/L — SIGNIFICANT CHANGE UP (ref 96–108)
CO2 SERPL-SCNC: 26 MMOL/L — SIGNIFICANT CHANGE UP (ref 22–31)
CREAT SERPL-MCNC: 0.74 MG/DL — SIGNIFICANT CHANGE UP (ref 0.5–1.3)
EGFR: 81 ML/MIN/1.73M2 — SIGNIFICANT CHANGE UP
GLUCOSE SERPL-MCNC: 179 MG/DL — HIGH (ref 70–99)
HCT VFR BLD CALC: 36 % — SIGNIFICANT CHANGE UP (ref 34.5–45)
HGB BLD-MCNC: 11.8 G/DL — SIGNIFICANT CHANGE UP (ref 11.5–15.5)
MCHC RBC-ENTMCNC: 30.9 PG — SIGNIFICANT CHANGE UP (ref 27–34)
MCHC RBC-ENTMCNC: 32.8 GM/DL — SIGNIFICANT CHANGE UP (ref 32–36)
MCV RBC AUTO: 94.2 FL — SIGNIFICANT CHANGE UP (ref 80–100)
PLATELET # BLD AUTO: 348 K/UL — SIGNIFICANT CHANGE UP (ref 150–400)
POTASSIUM SERPL-MCNC: 4.5 MMOL/L — SIGNIFICANT CHANGE UP (ref 3.5–5.3)
POTASSIUM SERPL-SCNC: 4.5 MMOL/L — SIGNIFICANT CHANGE UP (ref 3.5–5.3)
PROT SERPL-MCNC: 6.4 G/DL — SIGNIFICANT CHANGE UP (ref 6–8.3)
RBC # BLD: 3.82 M/UL — SIGNIFICANT CHANGE UP (ref 3.8–5.2)
RBC # FLD: 13.2 % — SIGNIFICANT CHANGE UP (ref 10.3–14.5)
SODIUM SERPL-SCNC: 140 MMOL/L — SIGNIFICANT CHANGE UP (ref 135–145)
WBC # BLD: 5.6 K/UL — SIGNIFICANT CHANGE UP (ref 3.8–10.5)
WBC # FLD AUTO: 5.6 K/UL — SIGNIFICANT CHANGE UP (ref 3.8–10.5)

## 2022-11-01 PROCEDURE — 99214 OFFICE O/P EST MOD 30 MIN: CPT

## 2022-11-01 NOTE — PHYSICAL EXAM
[General Appearance - Alert] : alert [General Appearance - In No Acute Distress] : in no acute distress [Oriented To Time, Place, And Person] : oriented to person, place, and time [Affect] : the affect was normal [FreeTextEntry1] : weak-appearing, sitting in wheelchair

## 2022-11-01 NOTE — ASSESSMENT
[______] : HCP: [unfilled] [FreeTextEntry1] : 82yoF with:\par \par # Stage IV Lung Adenocarcinoma - s/p gamma knife surgery, on Keytruda as of 10/11/22. Med Onc follow up. \par \par # Low back pain - MRI LS Spine from 7/2022 reviewed; patient has a multitude of lumbar spine issues. Recommend use of Tylenol 500mg PRN. \par \par # Adjustment d/o - Mood is more positive; empathetic listening and validation of patient's emotions provided. \par \par # Physical debility - c/w home PT. \par \par # Encounter for palliative care- \par Explained the role of palliative care in enhancing quality of life in the setting of serious illness.\par Health Care Proxy (HCP) form on file.\par \par Follow up in 4 weeks, call sooner with questions or issues.

## 2022-11-01 NOTE — HISTORY OF PRESENT ILLNESS
[Home] : at home, [unfilled] , at the time of the visit. [Medical Office: ([facility])___] : at the medical office located in  [Verbal consent obtained from patient] : the patient, [unfilled] [FreeTextEntry1] : 82yoF with stage IV NSCLC presents for follow-up palliative care visit. \par PMH significant for NSTEMI, DM2, Afib with RVR and RML PE, chronic low back pain, former smoker. \par \par Patient reports that she was suffering with chronic low back pain and progressive gait instability, was evaluated by orthopedist in 2022. She underwent imaging of the spinerain imaging demonstrated evidence of metastatic disease including a dominant right temporal lobe 3.1 cm enhancing mass with vasogenic edema. CT scan of her body demonstrated a ANTHONY tumor with subcentimeter pulmonary nodules and a nonspecific adrenal nodule. Bronchoscopy with biopsy performed on 2022 demonstrated adenocarcinoma consistent with lung primary; tumor tested PD-L1 negative and was negative for actionable mutations. She underwent craniotomy resection of the dominant brain mass on 2022 with pathology revealing the same findings. Hospital course was complicated by NSTEMI, A. fib with RVR and RML PE. She was put on medications for seizure prophylaxis. She was put on anticoagulation and currently remains on prophylactic dosing of anticoagulation with enoxaparin 40 mg subcu daily, pending clearance for therapeutic anticoagulation by neurosurgery. She was subsequently discharged to acute rehab on 8/10–2022. Has initiated home PT services. \par \par Pt met with Dr. Funes yesterday for evaluation for RT. \par Patient presents to establish with palliative care early on. \par She reports fatigue and weakness as her major issues lately. \par \par Interval history (10/28/22):\par Visit done via telephone. \par She was admitted to [facility] 10/1 - 10/5/22 with NSTEMI (had STMI in 2022).  Was placed on hep gtt with Neuro Sx clearance and transitioned to Eliquis low dose with plans to increase to full dose as outpt with Nsx clearance \par On Metoprolol xl 25 qd. \par \par Pt reports improvement in her ambulation, has not been using an assistive device.  \par She has pain in her low back, R>L. The pain is non-radicular. She describes the pain as "annoying" and causes her to limp when she walks.  She rates it as 8/10. Has not been using any analgesic for the pain.\par \par ROS:\par +mood has been more positive lately, she feels hopeful.\par +has been losing her hair (this precedes her cancer diagnosis)\par +appetite is low but she does well in keeping her nutritional intake up. \par Denies n/v, constipation, trouble sleeping\par Remainder of ROS non-contributory\par \par Patient is , lives alone. She has one son who lives nearby (two other sons are ). She has a 24/7 HHA since being home, is requiring assistance with her ADLs. She has a shower chair, grab bars. Has not had any falls since being home. Prior to the surgery she was independent, driving. \par \par PMD/Cardiologist: Dr. Kimani Uribe \par Endocrinologist: Dr. Chaz Richardson\par \par I-STOP Ref#: [id number]

## 2022-11-02 DIAGNOSIS — Z51.11 ENCOUNTER FOR ANTINEOPLASTIC CHEMOTHERAPY: ICD-10-CM

## 2022-11-03 ENCOUNTER — APPOINTMENT (OUTPATIENT)
Dept: NEUROSURGERY | Facility: CLINIC | Age: 82
End: 2022-11-03

## 2022-11-03 VITALS
BODY MASS INDEX: 30 KG/M2 | HEART RATE: 76 BPM | OXYGEN SATURATION: 99 % | WEIGHT: 163 LBS | TEMPERATURE: 97.7 F | SYSTOLIC BLOOD PRESSURE: 118 MMHG | HEIGHT: 62 IN | DIASTOLIC BLOOD PRESSURE: 80 MMHG

## 2022-11-03 DIAGNOSIS — M54.2 CERVICALGIA: ICD-10-CM

## 2022-11-03 PROCEDURE — 99024 POSTOP FOLLOW-UP VISIT: CPT

## 2022-11-04 PROBLEM — M54.2 PAIN IN NECK: Status: ACTIVE | Noted: 2022-11-04

## 2022-11-04 NOTE — HISTORY OF PRESENT ILLNESS
[FreeTextEntry1] : 11/3/22\par Today she is here for follow up. SHe is doing well has finished the radiation. She complains of dryness of the skull and being tired. She is walking with a walker and has a HHA. She denies fevers, chills or night sweats. \par She also complains of long standing low back pain. She has not had help with PT or injections in the past.\par \par 82F former smoker with multiple comorbidities was undergoing orthopedic evaluation as an outpatient due to ongoing chronic back pain and gait imbalance.  She ultimately presented to [facility] ED on 7/19/2022.  Brain imaging demonstrated evidence of metastatic disease including a dominant right temporal lobe 3.1 cm enhancing mass with vasogenic edema.  CT scan of her body demonstrated a ANTHONY tumor with subcentimeter pulmonary nodules and a nonspecific adrenal nodule.  Bronchoscopy with biopsy performed on 7/20/2022 demonstrated adenocarcinoma consistent with lung primary; tumor tested PD-L1 negative and was negative for actionable mutations.  She underwent craniotomy resection of the dominant brain mass on 7/27/2022 with pathology revealing the same findings.  Hospital course was complicated by NSTEMI, A. fib with RVR and RML PE.  She was put on medications for seizure prophylaxis.  She was put on anticoagulation and currently remains on prophylactic dosing of anticoagulation with enoxaparin 40 mg subcu daily, pending clearance for therapeutic anticoagulation by neurosurgery.  She was subsequently discharged to acute rehab on 8/10–8/26/2022.  Treated with GK-SRS to brain metastases in Sept 2022.  \par

## 2022-11-04 NOTE — ASSESSMENT
[FreeTextEntry1] : From the surgical point of view she is doing well. SHe will continue to follow with Crissy Leigh and Tristen. I renewed her Eliquis for her PE and afib since she is running out. For her low back pain I will have her do PT. I will see her back in 3 moths.

## 2022-11-04 NOTE — PHYSICAL EXAM
[FreeTextEntry1] : Assessment: \par Bilateral Knee OA; Bilateral Shoulder OA\par \par Plan:\par The bilateral knees and shoulders were injected as described above. Today they will rest, ice and use tylenol as needed for pain. Will also give prescription for Mobic. \par \par F/U in 3 months.\par All medical record entries made by the PA/Scribe/Fellow are at my, Dr. Toñito Raygoza's direction and personally dictated by me on 05/18/2021]. I have reviewed the chart and agree that the record accurately reflects my personal performance of the history, physical exam, assessment, and plan. I have also personally directed reviewed, and agreed with the chart.\par  [Well-Healed] : well-healed [FreeTextEntry1] : right frotoparietal [Person] : oriented to person [Place] : oriented to place [Time] : oriented to time [Short Term Intact] : short term memory intact [Span Intact] : the attention span was normal [Concentration Intact] : normal concentrating ability [Fluency] : fluency intact [Cranial Nerves Optic (II)] : visual acuity intact bilaterally,  pupils equal round and reactive to light [Cranial Nerves Oculomotor (III)] : extraocular motion intact [Cranial Nerves Trigeminal (V)] : facial sensation intact symmetrically [Cranial Nerves Facial (VII)] : face symmetrical [Cranial Nerves Vestibulocochlear (VIII)] : hearing was intact bilaterally [Cranial Nerves Glossopharyngeal (IX)] : tongue and palate midline [Cranial Nerves Accessory (XI - Cranial And Spinal)] : head turning and shoulder shrug symmetric [Cranial Nerves Hypoglossal (XII)] : there was no tongue deviation with protrusion [Motor Tone] : muscle tone was normal in all four extremities

## 2022-11-09 NOTE — ASSESSMENT
[FreeTextEntry1] : Metastatic NSCLC with adenocarcinoma histology; tumor tested PDL1 Negative and lacks actionable mutations but tested TMB-High. Started 1st line Pembro in mid Oct 2022.  Recommend:\par – Proceed with  first-line systemic therapy with single agent Pembrolizumab 200mg IV q3 weeks. For C2 today. \par – F/U with Radiation oncology s/p GK-SRS to brain metastases \par – Established care with Palliative Care \par – Previously discussed that systemic therapy would be ongoing, for as long as it benefited the patient or unless she developed intolerable side effects.  Would plan to obtain a restaging scan following 3 cycles of systemic therapy to assess response.\par – PE: Patient is currently on prophylactic anticoagulation with enoxaparin.  Await clearance from neurosurgery for full dose therapeutic anticoagulation.  Would plan to continue anticoagulation indefinitely given her active metastatic malignancy.\par – Continue follow-up and management with her PCP/cardiologist for her other ongoing medical problems and comorbidities.\par – I will defer to Neurosurgery with regards to her antiseizure medications.  The patient may benefit from neurology or neuro–oncology consultation for management of these medications, and that referral can be made by them, if appropriate; appointment with Dr. Loomis is scheduled for 11/3. \par –F/U pre-treatment with each cycle beginning with C2\par -Plan for restaging imaging following C3 if she remains clinically stable. \par -MONIQUE Goldberg gave patient her schedule and reviewed appointments.

## 2022-11-09 NOTE — HISTORY OF PRESENT ILLNESS
[Disease: _____________________] : Disease: [unfilled] [AJCC Stage: ____] : AJCC Stage: [unfilled] [de-identified] : 82F former smoker with multiple comorbidities was undergoing orthopedic evaluation as an outpatient due to ongoing chronic back pain and gait imbalance.  She ultimately presented to [facility] ED on 7/19/2022.  Brain imaging demonstrated evidence of metastatic disease including a dominant right temporal lobe 3.1 cm enhancing mass with vasogenic edema.  CT scan of her body demonstrated a ANTHONY tumor with subcentimeter pulmonary nodules and a nonspecific adrenal nodule.  Bronchoscopy with biopsy performed on 7/20/2022 demonstrated adenocarcinoma consistent with lung primary; tumor tested PD-L1 negative and was negative for actionable mutations.  She underwent craniotomy resection of the dominant brain mass on 7/27/2022 with pathology revealing the same findings.  Hospital course was complicated by NSTEMI, A. fib with RVR and RML PE.  She was put on medications for seizure prophylaxis.  She was put on anticoagulation and currently remains on prophylactic dosing of anticoagulation with enoxaparin 40 mg subcu daily, pending clearance for therapeutic anticoagulation by neurosurgery.  She was subsequently discharged to acute rehab on 8/10–8/26/2022.  Treated with GK-SRS to brain metastases in Sept 2022. Started 1st line single agent Pembro in mid Oct 2022.  [de-identified] : – Left mainstem bronchus biopsy 7/20/2022: Poorly differentiated adenocarcinoma of lung origin.\par PD-L1 negative/0%.\par Foundation One:  No actionable mutations (TP53+, among others); TMB-High.  \par –Right brain tumor resection 7/27/2022: Metastatic non-small cell carcinoma, favor adenocarcinoma, consistent with lung origin.\par PD-L1 negative. [de-identified] : She continues off steroids.  She is status post GK–SRS in 3 fractions earlier this month. \par \par Patient presents today prior to start C2 of 1st line single agent Pembro. \par Patient reports good appetite and stable weight. \par Her DM is managed by Dr. Chaz Richardson, endocrinologist, who she reports is considering starting her on Ozempic. \par She endorses occasional fleeting pain in her head that spontaneously resolves; denies visual changes. Reports compliance with keppra. \par She had a 24/7 HHA, Myla, that she is fond of. \par \par

## 2022-11-09 NOTE — PHYSICAL EXAM
[Ambulatory and capable of all self care but unable to carry out any work activities] : Status 2- Ambulatory and capable of all self care but unable to carry out any work activities. Up and about more than 50% of waking hours [Normal] : affect appropriate [de-identified] : No icterus  [de-identified] : Elderly woman in wheelchair  [de-identified] : MMM O/P Clear  [de-identified] : Supple No LAD  [de-identified] : Clear  [de-identified] : S1 S2 [de-identified] : LE 1+ pitting edema  [de-identified] : Soft NT [de-identified] : No spine tenderness

## 2022-11-22 ENCOUNTER — RESULT REVIEW (OUTPATIENT)
Age: 82
End: 2022-11-22

## 2022-11-22 ENCOUNTER — APPOINTMENT (OUTPATIENT)
Dept: INFUSION THERAPY | Facility: HOSPITAL | Age: 82
End: 2022-11-22

## 2022-11-22 ENCOUNTER — APPOINTMENT (OUTPATIENT)
Dept: HEMATOLOGY ONCOLOGY | Facility: CLINIC | Age: 82
End: 2022-11-22

## 2022-11-22 VITALS
OXYGEN SATURATION: 99 % | TEMPERATURE: 97.7 F | DIASTOLIC BLOOD PRESSURE: 80 MMHG | BODY MASS INDEX: 29.6 KG/M2 | SYSTOLIC BLOOD PRESSURE: 131 MMHG | WEIGHT: 162.92 LBS | HEIGHT: 62.01 IN | HEART RATE: 77 BPM | RESPIRATION RATE: 16 BRPM

## 2022-11-22 PROCEDURE — 99214 OFFICE O/P EST MOD 30 MIN: CPT

## 2022-11-23 LAB
ALBUMIN SERPL ELPH-MCNC: 4.2 G/DL — SIGNIFICANT CHANGE UP (ref 3.3–5)
ALP SERPL-CCNC: 98 U/L — SIGNIFICANT CHANGE UP (ref 40–120)
ALT FLD-CCNC: 9 U/L — LOW (ref 10–45)
ANION GAP SERPL CALC-SCNC: 12 MMOL/L — SIGNIFICANT CHANGE UP (ref 5–17)
AST SERPL-CCNC: 9 U/L — LOW (ref 10–40)
BILIRUB SERPL-MCNC: 0.3 MG/DL — SIGNIFICANT CHANGE UP (ref 0.2–1.2)
BUN SERPL-MCNC: 17 MG/DL — SIGNIFICANT CHANGE UP (ref 7–23)
CALCIUM SERPL-MCNC: 9.3 MG/DL — SIGNIFICANT CHANGE UP (ref 8.4–10.5)
CHLORIDE SERPL-SCNC: 102 MMOL/L — SIGNIFICANT CHANGE UP (ref 96–108)
CO2 SERPL-SCNC: 26 MMOL/L — SIGNIFICANT CHANGE UP (ref 22–31)
CREAT SERPL-MCNC: 0.86 MG/DL — SIGNIFICANT CHANGE UP (ref 0.5–1.3)
EGFR: 67 ML/MIN/1.73M2 — SIGNIFICANT CHANGE UP
GLUCOSE SERPL-MCNC: 178 MG/DL — HIGH (ref 70–99)
HCT VFR BLD CALC: 37.7 % — SIGNIFICANT CHANGE UP (ref 34.5–45)
HGB BLD-MCNC: 11.9 G/DL — SIGNIFICANT CHANGE UP (ref 11.5–15.5)
MCHC RBC-ENTMCNC: 29.7 PG — SIGNIFICANT CHANGE UP (ref 27–34)
MCHC RBC-ENTMCNC: 31.6 GM/DL — LOW (ref 32–36)
MCV RBC AUTO: 94 FL — SIGNIFICANT CHANGE UP (ref 80–100)
PLATELET # BLD AUTO: 278 K/UL — SIGNIFICANT CHANGE UP (ref 150–400)
POTASSIUM SERPL-MCNC: 4.9 MMOL/L — SIGNIFICANT CHANGE UP (ref 3.5–5.3)
POTASSIUM SERPL-SCNC: 4.9 MMOL/L — SIGNIFICANT CHANGE UP (ref 3.5–5.3)
PROT SERPL-MCNC: 6.2 G/DL — SIGNIFICANT CHANGE UP (ref 6–8.3)
RBC # BLD: 4.01 M/UL — SIGNIFICANT CHANGE UP (ref 3.8–5.2)
RBC # FLD: 13.2 % — SIGNIFICANT CHANGE UP (ref 10.3–14.5)
SODIUM SERPL-SCNC: 140 MMOL/L — SIGNIFICANT CHANGE UP (ref 135–145)
T4 FREE SERPL-MCNC: 1.2 NG/DL — SIGNIFICANT CHANGE UP (ref 0.9–1.8)
TSH SERPL-MCNC: 0.35 UIU/ML — SIGNIFICANT CHANGE UP (ref 0.27–4.2)
WBC # BLD: 6.53 K/UL — SIGNIFICANT CHANGE UP (ref 3.8–10.5)
WBC # FLD AUTO: 6.53 K/UL — SIGNIFICANT CHANGE UP (ref 3.8–10.5)

## 2022-11-25 NOTE — PATIENT PROFILE ADULT - NSPROPTRIGHTNOTIFY_GEN_A_NUR
Punch Biopsy Procedure Note     Pre-operative Diagnosis: Rash    Post-operative Diagnosis:   (R21) Rash  (primary encounter diagnosis)  Plan: SURGICAL PATHOLOGY    (L25.9) Contact dermatitis, unspecified contact dermatitis type, unspecified trigger    (R03.0) Elevated blood pressure reading without diagnosis of hypertension       Locations: Left outher thigh    Indications: Rash, spreading  --------------------------------------------------------------------------------------------------------------  TIME OUT  Date of Procedure:  Start Date: 11/25/2022  Start Time: 11:15    Medication List reviewed noting presence of those which may impact coagulation  Current Outpatient Medications   Medication Sig Dispense Refill   • loratadine (CLARITIN) 10 MG tablet Take 10 mg by mouth daily.     • Pramoxine/Calamine Lotion (Calahist) 1-8 % Lotion Apply 1 application topically 2 times daily as needed.     • predniSONE (DELTASONE) 10 MG tablet Take 1 tablet by mouth daily. 5 tablet 0   • triamcinolone (ARISTOCORT) 0.1 % ointment Apply topically 2 times daily. 30 g 0   • hydrOXYzine (ATARAX) 50 MG tablet Take 1 tablet by mouth 3 times daily as needed for Itching. 30 tablet 0   • ondansetron (ZOFRAN ODT) 4 MG disintegrating tablet Place 1 tablet onto the tongue every 8 hours as needed for Nausea. 10 tablet 0     No current facility-administered medications for this visit.        Pre-Procedure verification process  A review of all pertinent documentation related to the procedure was performed  Consent form, signed, dated, and witnessed and in the paper chart  yes  Date: 11/25/2022  Time: 11:15   -------------------------------------------------------------------------------------------------------------------  To be completed immediately prior to invasive procedure by a member of the procedural team.  Initial each section and sign (N/A where appropriate for not applicable)    Performed by: Angelica Brown,     Patient (Name and  JESSICA) verified :  Mel Pugh   1981    Procedure site verified : yes:     Procedure site marked : yes:   (unless genital, perineal, anal)    Availability of special equipment:  Not Applicable    Procedure verified : yes    Patient Position verified :  yes      Risks and benefits of procedure discussed.  ------------------------------------------------------------------------------------------------------------------- Start Date: 2022  Time: 11:15    Anesthesia: Lidocaine 1% with epinephrine with added sodium bicarbonate    Procedure Details       The risks (including bleeding and infection) and benefits of the   procedure and Verbal informed consent obtained.    The lesion and surrounding area was given a sterile prep using alcohol and draped in the usual sterile fashion. The skin was then stretched perpendicular to the skin tension lines and the lesion removed using the 2mm punch. Antibiotic ointment and a sterile dressing applied.  The specimen was sent for pathologic examination. The patient tolerated the procedure well.    EBL: 0 ml    Condition:  Stable    Complications:  none.    Plan:  1. Instructed to keep the wound dry and covered for 24-48h and clean thereafter.  2. Warning signs of infection were reviewed.    3. Recommended that the patient use NSAID as needed for pain.       Angelica Brown DO     declines

## 2022-12-01 NOTE — PHYSICAL EXAM
[Ambulatory and capable of all self care but unable to carry out any work activities] : Status 2- Ambulatory and capable of all self care but unable to carry out any work activities. Up and about more than 50% of waking hours [Normal] : affect appropriate [de-identified] : Elderly woman in wheelchair  [de-identified] : No icterus  [de-identified] : MMM O/P Clear  [de-identified] : Supple No LAD  [de-identified] : Clear  [de-identified] : S1 S2 [de-identified] : LE 1+ pitting edema  [de-identified] : Soft NT [de-identified] : No spine tenderness

## 2022-12-01 NOTE — HISTORY OF PRESENT ILLNESS
[Disease: _____________________] : Disease: [unfilled] [AJCC Stage: ____] : AJCC Stage: [unfilled] [de-identified] : 82F former smoker with multiple comorbidities was undergoing orthopedic evaluation as an outpatient due to ongoing chronic back pain and gait imbalance.  She ultimately presented to [facility] ED on 7/19/2022.  Brain imaging demonstrated evidence of metastatic disease including a dominant right temporal lobe 3.1 cm enhancing mass with vasogenic edema.  CT scan of her body demonstrated a ANTHONY tumor with subcentimeter pulmonary nodules and a nonspecific adrenal nodule.  Bronchoscopy with biopsy performed on 7/20/2022 demonstrated adenocarcinoma consistent with lung primary; tumor tested PD-L1 negative and was negative for actionable mutations. however, tested TMB-High.  She underwent craniotomy resection of the dominant brain mass on 7/27/2022 with pathology revealing the same findings.  Hospital course was complicated by NSTEMI, Afib with RVR and RML PE.  She was put on medications for seizure prophylaxis.  She was subsequently discharged to acute rehab on 8/10–8/26/2022.  Treated with GK-SRS to brain metastases in Sept 2022. Started 1st line single agent Pembro in mid Oct 2022.  [de-identified] : – Left mainstem bronchus biopsy 7/20/2022: Poorly differentiated adenocarcinoma of lung origin.\par PD-L1 negative/0%.\par [facility] One:  No actionable mutations (TP53+, among others); TMB-High.  \par –Right brain tumor resection 7/27/2022: Metastatic non-small cell carcinoma, favor adenocarcinoma, consistent with lung origin.\par PD-L1 negative. [de-identified] : Patient started first-line single agent Pembrolizumab in Oct 2022; she presents today prior to C3.  \par She complains of right hip pain which is chronic and predates her diagnosis.  She reports pruritus; no skin rash.  She experiences constipation alternating with diarrhea; she uses MiraLAX occasionally.  She reports significant diarrhea last night which has resolved.  She describes postnasal drip symptoms; recommended Flonase or generic equivalents.\par I noted and discussed with her that she has follow-up with Dr. Pierre however she needs to have her brain MRI scheduled.\par \par

## 2022-12-01 NOTE — ASSESSMENT
[FreeTextEntry1] : Metastatic NSCLC with adenocarcinoma histology; tumor tested PDL1 Negative and lacks actionable mutations but tested TMB-High. Started first line single agent Pembrolizumab in Oct 2022.  Recommend:\par – Continue with  first-line systemic therapy with single agent Pembrolizumab. For C3 today. \par – F/U with Radiation oncology s/p GK-SRS to brain metastases \par – Established care with Palliative Care \par – PE: Continue Eliquis anticoagulation indefinitely given PE and Afib.\par – Continue follow-up and management with her PCP/cardiologist for her other ongoing medical problems and comorbidities.\par -Iron deficiency:  On PO iron repletion.  Monitor hemoglobin. \par – Restaging scan prior to C4 of systemic therapy to assess response.

## 2022-12-01 NOTE — RESULTS/DATA
[FreeTextEntry1] : -CT Brain 7/18/22:  Cystic versus a necrotic masses measure approximately 4.8 x 3.2 x 3.2 cm in the right temporal lobe and 1.4 x 1.3 x 1.6 cm in the medial left frontal lobe.  Moderate to large amount of vasogenic edema in the right frontal, parietal and temporal lobes. Trace edema in the left frontal lobe.  Regional mass effect in the right cerebral hemisphere with partial effacement of the ventricular system and 5 mm subfalcine herniation of the right frontal lobe underneath the cerebral falx.  The findings may represent intracranial metastatic disease versus glioblastoma. Contrast-enhanced brain MRI is recommended for further evaluation.\par \par -CT C/A/P 7/18/22:  Left upper lobe lung mass with partial atelectasis of the left upper lobe, concerning for primary lung neoplasm. Few scattered sub-4 mm pulmonary nodules, indeterminate.  Indeterminant left adrenal nodule for which contrast enhanced MRI is recommended for further evaluation.\par \par -MRI Brain 7/20/22:  Right temporal lobe peripherally enhancing, centrally necrotic 3.1 x 2.6 cm mass demonstrating mild diffusion restriction. There is surrounding vasogenic edema. 8 mm leftward midline shift. Right uncal herniation. Additional left parafalcine 1.3 x 0.9 cm rim-enhancing cystic lesion with mild surrounding vasogenic edema. Tiny 0.3 x 0.3 cm rim-enhancing lesion within the left frontal cortex. Findings are concerning for metastatic disease in the context of patient's known primary lung cancer. The left lateral ventricle is mildly dilated.\par \par -LE Dopplers 7/24/22:  No evidence of deep venous thrombosis in either lower extremity.\par -LE Dopplers 7/28/22:  No evidence of deep venous thrombosis in either lower extremity.\par \par -CT Chest Angio 8/3/22:  Pulmonary embolism in the lateral segmental branch of the right middle lobe. No evidence of right heart strain.  Interval decrease in left upper lobe mass.  Unchanged obstruction of the posterior bronchus, and stable fibroglandular lymph node.\par \par -Abdominal U/S 8/4/22:   Mildly distended gallbladder. No evidence of acute cholecystitis.  Possible mild hydronephrosis.\par \par -LE Dopplers 8/7/22:  No evidence of deep venous thrombosis in either lower extremity.\par \par -LE Dopplers 9/1/22:  No evidence of deep venous thrombosis in either lower extremity.\par \par -PET/CT 9/9/22:  Abnormal FDG-PET/CT scan.\par 1. FDG-avid left upper lobe lung mass abutting the mediastinum at level of AP window, with associated atelectasis along the mediastinum, unchanged as compared to CT dated 8/3/2022, corresponds to patient's known adenocarcinoma.\par 2. Right temporal craniotomy with photopenic low attenuation within right temporal lobe, compatible with vasogenic edema, gliosis, and/or postsurgical changes, better evaluated on contrast-enhanced CT or MRI.\par 3. Findings suggestive of left vocal cord paralysis, likely secondary to left upper lobe lung mass. Please correlate clinically and with direct visualization, as indicated.\par 4. Nonspecific hypermetabolism in small bowel and colon likely is related to metformin.\par 5. No scan evidence of metastatic disease.\par \par

## 2022-12-08 ENCOUNTER — APPOINTMENT (OUTPATIENT)
Dept: NUCLEAR MEDICINE | Facility: IMAGING CENTER | Age: 82
End: 2022-12-08

## 2022-12-08 ENCOUNTER — OUTPATIENT (OUTPATIENT)
Dept: OUTPATIENT SERVICES | Facility: HOSPITAL | Age: 82
LOS: 1 days | End: 2022-12-08
Payer: MEDICARE

## 2022-12-08 ENCOUNTER — APPOINTMENT (OUTPATIENT)
Dept: RADIATION ONCOLOGY | Facility: CLINIC | Age: 82
End: 2022-12-08

## 2022-12-08 ENCOUNTER — APPOINTMENT (OUTPATIENT)
Dept: MRI IMAGING | Facility: IMAGING CENTER | Age: 82
End: 2022-12-08

## 2022-12-08 DIAGNOSIS — C79.31 SECONDARY MALIGNANT NEOPLASM OF BRAIN: ICD-10-CM

## 2022-12-08 DIAGNOSIS — C34.12 MALIGNANT NEOPLASM OF UPPER LOBE, LEFT BRONCHUS OR LUNG: ICD-10-CM

## 2022-12-08 PROCEDURE — 78815 PET IMAGE W/CT SKULL-THIGH: CPT | Mod: 26,PS,MH

## 2022-12-08 PROCEDURE — 70553 MRI BRAIN STEM W/O & W/DYE: CPT | Mod: 26,MH

## 2022-12-08 PROCEDURE — 78815 PET IMAGE W/CT SKULL-THIGH: CPT

## 2022-12-08 PROCEDURE — 70553 MRI BRAIN STEM W/O & W/DYE: CPT

## 2022-12-08 PROCEDURE — A9585: CPT

## 2022-12-08 PROCEDURE — A9552: CPT

## 2022-12-09 ENCOUNTER — RX RENEWAL (OUTPATIENT)
Age: 82
End: 2022-12-09

## 2022-12-09 ENCOUNTER — NON-APPOINTMENT (OUTPATIENT)
Age: 82
End: 2022-12-09

## 2022-12-10 RX ORDER — APIXABAN 2.5 MG/1
2.5 TABLET, FILM COATED ORAL
Qty: 60 | Refills: 5 | Status: ACTIVE | COMMUNITY
Start: 2022-11-03 | End: 1900-01-01

## 2022-12-15 ENCOUNTER — NON-APPOINTMENT (OUTPATIENT)
Age: 82
End: 2022-12-15

## 2022-12-15 ENCOUNTER — APPOINTMENT (OUTPATIENT)
Dept: HEMATOLOGY ONCOLOGY | Facility: CLINIC | Age: 82
End: 2022-12-15

## 2022-12-15 ENCOUNTER — APPOINTMENT (OUTPATIENT)
Dept: RADIATION ONCOLOGY | Facility: CLINIC | Age: 82
End: 2022-12-15

## 2022-12-15 ENCOUNTER — APPOINTMENT (OUTPATIENT)
Dept: INFUSION THERAPY | Facility: HOSPITAL | Age: 82
End: 2022-12-15

## 2022-12-15 ENCOUNTER — RESULT REVIEW (OUTPATIENT)
Age: 82
End: 2022-12-15

## 2022-12-15 VITALS
WEIGHT: 162.15 LBS | HEIGHT: 62.01 IN | DIASTOLIC BLOOD PRESSURE: 88 MMHG | BODY MASS INDEX: 29.46 KG/M2 | RESPIRATION RATE: 16 BRPM | OXYGEN SATURATION: 99 % | HEART RATE: 61 BPM | SYSTOLIC BLOOD PRESSURE: 139 MMHG | TEMPERATURE: 98.5 F

## 2022-12-15 LAB
ALBUMIN SERPL ELPH-MCNC: 4.1 G/DL — SIGNIFICANT CHANGE UP (ref 3.3–5)
ALP SERPL-CCNC: 95 U/L — SIGNIFICANT CHANGE UP (ref 40–120)
ALT FLD-CCNC: 9 U/L — LOW (ref 10–45)
ANION GAP SERPL CALC-SCNC: 15 MMOL/L — SIGNIFICANT CHANGE UP (ref 5–17)
AST SERPL-CCNC: 13 U/L — SIGNIFICANT CHANGE UP (ref 10–40)
BASOPHILS # BLD AUTO: 0.04 K/UL — SIGNIFICANT CHANGE UP (ref 0–0.2)
BASOPHILS NFR BLD AUTO: 0.7 % — SIGNIFICANT CHANGE UP (ref 0–2)
BILIRUB SERPL-MCNC: 0.4 MG/DL — SIGNIFICANT CHANGE UP (ref 0.2–1.2)
BUN SERPL-MCNC: 23 MG/DL — SIGNIFICANT CHANGE UP (ref 7–23)
CALCIUM SERPL-MCNC: 9.7 MG/DL — SIGNIFICANT CHANGE UP (ref 8.4–10.5)
CHLORIDE SERPL-SCNC: 104 MMOL/L — SIGNIFICANT CHANGE UP (ref 96–108)
CO2 SERPL-SCNC: 22 MMOL/L — SIGNIFICANT CHANGE UP (ref 22–31)
CREAT SERPL-MCNC: 0.86 MG/DL — SIGNIFICANT CHANGE UP (ref 0.5–1.3)
EGFR: 67 ML/MIN/1.73M2 — SIGNIFICANT CHANGE UP
EOSINOPHIL # BLD AUTO: 0.33 K/UL — SIGNIFICANT CHANGE UP (ref 0–0.5)
EOSINOPHIL NFR BLD AUTO: 5.6 % — SIGNIFICANT CHANGE UP (ref 0–6)
GLUCOSE SERPL-MCNC: 93 MG/DL — SIGNIFICANT CHANGE UP (ref 70–99)
HCT VFR BLD CALC: 39.2 % — SIGNIFICANT CHANGE UP (ref 34.5–45)
HGB BLD-MCNC: 12.6 G/DL — SIGNIFICANT CHANGE UP (ref 11.5–15.5)
IMM GRANULOCYTES NFR BLD AUTO: 0.3 % — SIGNIFICANT CHANGE UP (ref 0–0.9)
LYMPHOCYTES # BLD AUTO: 2.06 K/UL — SIGNIFICANT CHANGE UP (ref 1–3.3)
LYMPHOCYTES # BLD AUTO: 35 % — SIGNIFICANT CHANGE UP (ref 13–44)
MCHC RBC-ENTMCNC: 29.6 PG — SIGNIFICANT CHANGE UP (ref 27–34)
MCHC RBC-ENTMCNC: 32.1 G/DL — SIGNIFICANT CHANGE UP (ref 32–36)
MCV RBC AUTO: 92 FL — SIGNIFICANT CHANGE UP (ref 80–100)
MONOCYTES # BLD AUTO: 0.41 K/UL — SIGNIFICANT CHANGE UP (ref 0–0.9)
MONOCYTES NFR BLD AUTO: 7 % — SIGNIFICANT CHANGE UP (ref 2–14)
NEUTROPHILS # BLD AUTO: 3.03 K/UL — SIGNIFICANT CHANGE UP (ref 1.8–7.4)
NEUTROPHILS NFR BLD AUTO: 51.4 % — SIGNIFICANT CHANGE UP (ref 43–77)
NRBC # BLD: 0 /100 WBCS — SIGNIFICANT CHANGE UP (ref 0–0)
PLATELET # BLD AUTO: 294 K/UL — SIGNIFICANT CHANGE UP (ref 150–400)
POTASSIUM SERPL-MCNC: 4.7 MMOL/L — SIGNIFICANT CHANGE UP (ref 3.5–5.3)
POTASSIUM SERPL-SCNC: 4.7 MMOL/L — SIGNIFICANT CHANGE UP (ref 3.5–5.3)
PROT SERPL-MCNC: 6.4 G/DL — SIGNIFICANT CHANGE UP (ref 6–8.3)
RBC # BLD: 4.26 M/UL — SIGNIFICANT CHANGE UP (ref 3.8–5.2)
RBC # FLD: 13.2 % — SIGNIFICANT CHANGE UP (ref 10.3–14.5)
SODIUM SERPL-SCNC: 142 MMOL/L — SIGNIFICANT CHANGE UP (ref 135–145)
WBC # BLD: 5.89 K/UL — SIGNIFICANT CHANGE UP (ref 3.8–10.5)
WBC # FLD AUTO: 5.89 K/UL — SIGNIFICANT CHANGE UP (ref 3.8–10.5)

## 2022-12-15 PROCEDURE — 99024 POSTOP FOLLOW-UP VISIT: CPT

## 2022-12-15 PROCEDURE — 99214 OFFICE O/P EST MOD 30 MIN: CPT | Mod: 95

## 2022-12-15 NOTE — PHYSICAL EXAM
[Motor Tone] : muscle strength and tone were normal [] : no respiratory distress [Normal] : no joint swelling, no clubbing or cyanosis of the fingernails and muscle strength and tone were normal

## 2022-12-15 NOTE — VITALS
[Least Pain Intensity: 0/10] : 0/10 [50: Requires considerable assistance and frequent medical care.] : 50: Requires considerable assistance and frequent medical care. [Date: ____________] : Patient's last distress assessment performed on [unfilled]. [5 - Distress Level] : Distress Level: 5 [Maximal Pain Intensity: 0/10] : 0/10

## 2022-12-20 NOTE — DISCHARGE NOTE PROVIDER - NS AS DC PROVIDER CONTACT Y/N MULTI
Yes

## 2022-12-22 ENCOUNTER — APPOINTMENT (OUTPATIENT)
Dept: OTOLARYNGOLOGY | Facility: CLINIC | Age: 82
End: 2022-12-22

## 2022-12-22 ENCOUNTER — RX RENEWAL (OUTPATIENT)
Age: 82
End: 2022-12-22

## 2022-12-22 ENCOUNTER — APPOINTMENT (OUTPATIENT)
Dept: MRI IMAGING | Facility: IMAGING CENTER | Age: 82
End: 2022-12-22

## 2022-12-22 VITALS
SYSTOLIC BLOOD PRESSURE: 127 MMHG | WEIGHT: 162 LBS | HEIGHT: 62 IN | BODY MASS INDEX: 29.81 KG/M2 | DIASTOLIC BLOOD PRESSURE: 84 MMHG | HEART RATE: 76 BPM

## 2022-12-22 DIAGNOSIS — R09.81 NASAL CONGESTION: ICD-10-CM

## 2022-12-22 PROCEDURE — 99204 OFFICE O/P NEW MOD 45 MIN: CPT | Mod: 25

## 2022-12-22 PROCEDURE — 31575 DIAGNOSTIC LARYNGOSCOPY: CPT

## 2022-12-22 RX ORDER — DEXAMETHASONE 4 MG/1
4 TABLET ORAL
Qty: 30 | Refills: 5 | Status: COMPLETED | COMMUNITY
Start: 2022-08-31 | End: 2022-12-22

## 2022-12-22 NOTE — REVIEW OF SYSTEMS
[Dizziness] : dizziness [Difficulty Walking] : difficulty walking [Negative] : Musculoskeletal [Dysphagia] : dysphagia [Fever] : no fever [Chills] : no chills [Night Sweats] : no night sweats [Eye Pain] : no eye pain [Red Eyes] : eyes not red [Dry Eyes] : no dryness of the eyes [Loss of Hearing] : no loss of hearing [Chest Pain] : no chest pain [Palpitations] : no palpitations [FreeTextEntry3] : eyes not right since surgery. eyes itchy, vision not acutely worsening [FreeTextEntry4] : dysphagia to liquids since surgery [FreeTextEntry7] : denies nausea [FreeTextEntry9] : denies focal weakness [de-identified] : unsteady since surgery, stable. uses wheelchair for appointments. intermittent mild headaches. occasional dizziness. forgetfulness, notes stable since prior to RT. Unsteady gait since surgery, stable.

## 2022-12-22 NOTE — HISTORY OF PRESENT ILLNESS
[FreeTextEntry1] : Ms. Ros Phan presented initially for evaluation of brain mets on 9/6/2022 (notably a right temporal lobe peripherally enhancing, centrally necrotic 3.1 x 2.6 cm mass demonstrating mild diffusion restriction with surrounding vasogenic edema as well as a left parafalcine lesion) s/p craniotomy and resection of R temporal lesion on 7/27/2022, and now being considered for RT. Patient has a lung primary, NSCLC (adenocarcinoma on histology, PD-L1 negative and negative for targetable mutations).\par She completed radiation therapy for a total of 2700 cgy to a right temporal cavity and a left frontal lesion over 3 fractions 9/19-9/22/2022, and to a left frontal lesion 1800 cgy over 1 fraction on 9/22/2022\par \par ONCOLOGIC HISTORY\par 82F former smoker, PMH of HTN, HLD, CAD, PAD, MI, DM2, peripheral neuropathy who was undergoing outpatient orthopedic evaluation due to ongoing chronic back pain (20+ years) and gait imbalance. MRI outpatient was reported to be abnormal and told to go to ED for further imaging. She ultimately presented to [facility] ED on 7/19/2022. Brain imaging demonstrated evidence of metastatic disease including a dominant right temporal lobe 3.1 cm enhancing mass with vasogenic edema. There was also a Left parafalcine lesion noted. CT scan of her body demonstrated a ANTHONY tumor with subcentimeter pulmonary nodules and a nonspecific adrenal nodule. Bronchoscopy with biopsy performed on 7/20/2022 demonstrated adenocarcinoma consistent with lung primary; tumor tested PD-L1 negative and was negative for actionable mutations. She underwent craniotomy resection of the dominant brain mass on 7/27/2022 with pathology revealing the same findings. Hospital course was complicated by NSTEMI, A. fib with RVR and RML PE. She was put on medications for seizure prophylaxis. She was put on anticoagulation and currently remains on prophylactic dosing of anticoagulation with enoxaparin 40 mg subcu daily, pending clearance for therapeutic anticoagulation by neurosurgery. She was subsequently discharged to acute rehab on 8/10–8/26/2022.\par \par 9/5/2022: Patient presents for initial consult today. She is off of steroids at this time. Continues on keppra and deptakote at this time. Recently follow up with Dr. Ramon and plans to start patient on carboplatin/pemetrexed/pembro. Patient has a PET scan scheduled for Friday. She notes blurry vision in the L eye, short-term memory issues, gait instability following surgery. \par \par 12/15/2022- Ms. Phan presents today for post treatment evaluation.\par Brain MRI 12/8/2022 showed Interval decrease in size and peripheral enhancement of the medial left frontal lesion. Smaller ring-enhancing lesion more laterally along the left frontal cortex no longer visualized.\par No new lesions seen\par Postsurgical cavity right temporal lobe with mild adjacent enhancement. Continued follow-up recommended.\par \par She continues to follow with Dr. Ramon. continues on first line therapy with single agent pembrolizumab. \par \par PET scan 12/8/2022 showed 1. FDG-avid left upper lobe lung nodule is decreased in size and metabolism, compatible with a partial response to interval therapy. Interval decrease in adjacent left upper lobe paramediastinal opacity, likely atelectasis, which is unchanged in metabolism.\par 2. Right temporal craniotomy with photopenic low attenuation within right temporal lobe, compatible with vasogenic edema, gliosis, and/or postsurgical changes, better evaluated on contrast-enhanced CT or MRI, not significantly changed.\par 3. 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 Nonspecific hypermetabolism in small bowel and colon, unchanged, likely related to metformin.\par 5. No scan evidence of metastatic disease.\par \par Today she feels well overall. no headaches, no nausea. notes some persistent dysphagia since post surgery. still having trouble ambulating since surgery due to imbalance, but not worse at all. Uses a wheelchair only when out for appointments. memory is poor, remaining since surgery, not worse. \par

## 2022-12-22 NOTE — DISEASE MANAGEMENT
[Clinical] : TNM Stage: c [N/A] : Currently not applicable [FreeTextEntry4] : brain mets [NTNM] : x [TTNM] : x [MTNM] : x

## 2022-12-23 ENCOUNTER — OUTPATIENT (OUTPATIENT)
Dept: OUTPATIENT SERVICES | Facility: HOSPITAL | Age: 82
LOS: 1 days | Discharge: HOME | End: 2022-12-23

## 2022-12-23 DIAGNOSIS — C34.90 MALIGNANT NEOPLASM OF UNSPECIFIED PART OF UNSPECIFIED BRONCHUS OR LUNG: ICD-10-CM

## 2022-12-23 DIAGNOSIS — I73.9 PERIPHERAL VASCULAR DISEASE, UNSPECIFIED: Chronic | ICD-10-CM

## 2022-12-23 DIAGNOSIS — I25.10 ATHEROSCLEROTIC HEART DISEASE OF NATIVE CORONARY ARTERY WITHOUT ANGINA PECTORIS: Chronic | ICD-10-CM

## 2022-12-23 NOTE — RESULTS/DATA
[FreeTextEntry1] : -CT Brain 7/18/22:  Cystic versus a necrotic masses measure approximately 4.8 x 3.2 x 3.2 cm in the right temporal lobe and 1.4 x 1.3 x 1.6 cm in the medial left frontal lobe.  Moderate to large amount of vasogenic edema in the right frontal, parietal and temporal lobes. Trace edema in the left frontal lobe.  Regional mass effect in the right cerebral hemisphere with partial effacement of the ventricular system and 5 mm subfalcine herniation of the right frontal lobe underneath the cerebral falx.  The findings may represent intracranial metastatic disease versus glioblastoma. Contrast-enhanced brain MRI is recommended for further evaluation.\par \par -CT C/A/P 7/18/22:  Left upper lobe lung mass with partial atelectasis of the left upper lobe, concerning for primary lung neoplasm. Few scattered sub-4 mm pulmonary nodules, indeterminate.  Indeterminant left adrenal nodule for which contrast enhanced MRI is recommended for further evaluation.\par \par -MRI Brain 7/20/22:  Right temporal lobe peripherally enhancing, centrally necrotic 3.1 x 2.6 cm mass demonstrating mild diffusion restriction. There is surrounding vasogenic edema. 8 mm leftward midline shift. Right uncal herniation. Additional left parafalcine 1.3 x 0.9 cm rim-enhancing cystic lesion with mild surrounding vasogenic edema. Tiny 0.3 x 0.3 cm rim-enhancing lesion within the left frontal cortex. Findings are concerning for metastatic disease in the context of patient's known primary lung cancer. The left lateral ventricle is mildly dilated.\par \par -LE Dopplers 7/24/22:  No evidence of deep venous thrombosis in either lower extremity.\par -LE Dopplers 7/28/22:  No evidence of deep venous thrombosis in either lower extremity.\par \par -CT Chest Angio 8/3/22:  Pulmonary embolism in the lateral segmental branch of the right middle lobe. No evidence of right heart strain.  Interval decrease in left upper lobe mass.  Unchanged obstruction of the posterior bronchus, and stable fibroglandular lymph node.\par \par -Abdominal U/S 8/4/22:   Mildly distended gallbladder. No evidence of acute cholecystitis.  Possible mild hydronephrosis.\par \par -LE Dopplers 8/7/22:  No evidence of deep venous thrombosis in either lower extremity.\par \par -LE Dopplers 9/1/22:  No evidence of deep venous thrombosis in either lower extremity.\par \par -PET/CT 9/9/22:  Abnormal FDG-PET/CT scan.\par 1. FDG-avid left upper lobe lung mass abutting the mediastinum at level of AP window, with associated atelectasis along the mediastinum, unchanged as compared to CT dated 8/3/2022, corresponds to patient's known adenocarcinoma.\par 2. Right temporal craniotomy with photopenic low attenuation within right temporal lobe, compatible with vasogenic edema, gliosis, and/or postsurgical changes, better evaluated on contrast-enhanced CT or MRI.\par 3. Findings suggestive of left vocal cord paralysis, likely secondary to left upper lobe lung mass. Please correlate clinically and with direct visualization, as indicated.\par 4. Nonspecific hypermetabolism in small bowel and colon likely is related to metformin.\par 5. No scan evidence of metastatic disease.\par \par -PET/CT 12/8/22: \par 1. FDG-avid left upper lobe lung nodule is decreased in size and metabolism, compatible with a partial response to interval therapy. Interval decrease in adjacent left upper lobe paramediastinal opacity, likely atelectasis, which is unchanged in metabolism.\par 2. Right temporal craniotomy with photopenic low attenuation within right temporal lobe, compatible with vasogenic edema, gliosis, and/or postsurgical changes, better evaluated on contrast-enhanced CT or MRI, not significantly changed.\par 3. 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 Nonspecific hypermetabolism in small bowel and colon, unchanged, likely related to metformin.\par 5. No scan evidence of metastatic disease.\par \par -MRI 12/8/22:  Interval decrease in size and peripheral enhancement of the medial left frontal lesion. Smaller ring-enhancing lesion more laterally along the left frontal cortex no longer visualized.\par No new lesions seen\par Postsurgical cavity right temporal lobe with mild adjacent enhancement. Continued follow-up recommended.\par \par

## 2022-12-23 NOTE — PHYSICAL EXAM
[Ambulatory and capable of all self care but unable to carry out any work activities] : Status 2- Ambulatory and capable of all self care but unable to carry out any work activities. Up and about more than 50% of waking hours [Normal] : affect appropriate [de-identified] : Elderly woman in wheelchair  [de-identified] : No icterus  [de-identified] : MMM O/P Clear  [de-identified] : No visible LAD  [de-identified] : Clear  [de-identified] : S1 S2 [de-identified] : LE 1+ pitting edema  [de-identified] : Soft NT [de-identified] : No spine tenderness

## 2022-12-23 NOTE — ASSESSMENT
[FreeTextEntry1] : Metastatic NSCLC with adenocarcinoma histology; tumor tested PDL1 Negative and lacks actionable mutations but tested TMB-High. Started first line single agent Pembrolizumab in Oct 2022. PET/CT December 2022 with [facility]. Brain MRI Brain with appropriate response as per Dr. Funes.  Recommend:\par – Continue with  first-line systemic therapy with single agent Pembrolizumab. For C4 today. \par – F/U with Radiation oncology s/p GK-SRS to brain metastases \par – Established care with Palliative Care \par -Dysphagia: referred to speech and swallow by Dr. Funes\par -Gait Imbalace/deconditioning: referred to PM&R by Dr. Funes\par – PE: Continue Eliquis anticoagulation indefinitely given PE and Afib.\par – Continue follow-up and management with her PCP/cardiologist for her other ongoing medical problems and comorbidities.\par -Iron deficiency:  On PO iron repletion.  Monitor hemoglobin. \par -Follow up prior to treatment going forward. \par -Further appointments to be made at next f/u visit.

## 2022-12-30 ENCOUNTER — NON-APPOINTMENT (OUTPATIENT)
Age: 82
End: 2022-12-30

## 2023-01-03 ENCOUNTER — APPOINTMENT (OUTPATIENT)
Dept: HEMATOLOGY ONCOLOGY | Facility: CLINIC | Age: 83
End: 2023-01-03

## 2023-01-03 ENCOUNTER — NON-APPOINTMENT (OUTPATIENT)
Age: 83
End: 2023-01-03

## 2023-01-05 ENCOUNTER — RESULT REVIEW (OUTPATIENT)
Age: 83
End: 2023-01-05

## 2023-01-05 ENCOUNTER — APPOINTMENT (OUTPATIENT)
Dept: HEMATOLOGY ONCOLOGY | Facility: CLINIC | Age: 83
End: 2023-01-05
Payer: MEDICARE

## 2023-01-05 ENCOUNTER — APPOINTMENT (OUTPATIENT)
Dept: INFUSION THERAPY | Facility: HOSPITAL | Age: 83
End: 2023-01-05

## 2023-01-05 ENCOUNTER — APPOINTMENT (OUTPATIENT)
Dept: OTOLARYNGOLOGY | Facility: CLINIC | Age: 83
End: 2023-01-05
Payer: MEDICARE

## 2023-01-05 VITALS
SYSTOLIC BLOOD PRESSURE: 116 MMHG | HEIGHT: 62 IN | BODY MASS INDEX: 29.81 KG/M2 | HEART RATE: 73 BPM | DIASTOLIC BLOOD PRESSURE: 73 MMHG | WEIGHT: 162 LBS

## 2023-01-05 DIAGNOSIS — J38.01 PARALYSIS OF VOCAL CORDS AND LARYNX, UNILATERAL: ICD-10-CM

## 2023-01-05 DIAGNOSIS — R49.0 DYSPHONIA: ICD-10-CM

## 2023-01-05 DIAGNOSIS — R13.10 DYSPHAGIA, UNSPECIFIED: ICD-10-CM

## 2023-01-05 LAB
ALBUMIN SERPL ELPH-MCNC: 4.3 G/DL — SIGNIFICANT CHANGE UP (ref 3.3–5)
ALP SERPL-CCNC: 100 U/L — SIGNIFICANT CHANGE UP (ref 40–120)
ALT FLD-CCNC: 7 U/L — LOW (ref 10–45)
ANION GAP SERPL CALC-SCNC: 13 MMOL/L — SIGNIFICANT CHANGE UP (ref 5–17)
AST SERPL-CCNC: 10 U/L — SIGNIFICANT CHANGE UP (ref 10–40)
BASOPHILS # BLD AUTO: 0.03 K/UL — SIGNIFICANT CHANGE UP (ref 0–0.2)
BASOPHILS NFR BLD AUTO: 0.4 % — SIGNIFICANT CHANGE UP (ref 0–2)
BILIRUB SERPL-MCNC: 0.3 MG/DL — SIGNIFICANT CHANGE UP (ref 0.2–1.2)
BUN SERPL-MCNC: 23 MG/DL — SIGNIFICANT CHANGE UP (ref 7–23)
CALCIUM SERPL-MCNC: 9.5 MG/DL — SIGNIFICANT CHANGE UP (ref 8.4–10.5)
CHLORIDE SERPL-SCNC: 103 MMOL/L — SIGNIFICANT CHANGE UP (ref 96–108)
CO2 SERPL-SCNC: 26 MMOL/L — SIGNIFICANT CHANGE UP (ref 22–31)
CREAT SERPL-MCNC: 0.98 MG/DL — SIGNIFICANT CHANGE UP (ref 0.5–1.3)
EGFR: 58 ML/MIN/1.73M2 — LOW
EOSINOPHIL # BLD AUTO: 0.38 K/UL — SIGNIFICANT CHANGE UP (ref 0–0.5)
EOSINOPHIL NFR BLD AUTO: 5.3 % — SIGNIFICANT CHANGE UP (ref 0–6)
GLUCOSE SERPL-MCNC: 129 MG/DL — HIGH (ref 70–99)
HCT VFR BLD CALC: 37.4 % — SIGNIFICANT CHANGE UP (ref 34.5–45)
HGB BLD-MCNC: 12.3 G/DL — SIGNIFICANT CHANGE UP (ref 11.5–15.5)
IMM GRANULOCYTES NFR BLD AUTO: 0.4 % — SIGNIFICANT CHANGE UP (ref 0–0.9)
LYMPHOCYTES # BLD AUTO: 2.15 K/UL — SIGNIFICANT CHANGE UP (ref 1–3.3)
LYMPHOCYTES # BLD AUTO: 30.2 % — SIGNIFICANT CHANGE UP (ref 13–44)
MCHC RBC-ENTMCNC: 29.5 PG — SIGNIFICANT CHANGE UP (ref 27–34)
MCHC RBC-ENTMCNC: 32.9 G/DL — SIGNIFICANT CHANGE UP (ref 32–36)
MCV RBC AUTO: 89.7 FL — SIGNIFICANT CHANGE UP (ref 80–100)
MONOCYTES # BLD AUTO: 0.48 K/UL — SIGNIFICANT CHANGE UP (ref 0–0.9)
MONOCYTES NFR BLD AUTO: 6.8 % — SIGNIFICANT CHANGE UP (ref 2–14)
NEUTROPHILS # BLD AUTO: 4.04 K/UL — SIGNIFICANT CHANGE UP (ref 1.8–7.4)
NEUTROPHILS NFR BLD AUTO: 56.9 % — SIGNIFICANT CHANGE UP (ref 43–77)
NRBC # BLD: 0 /100 WBCS — SIGNIFICANT CHANGE UP (ref 0–0)
PLATELET # BLD AUTO: 285 K/UL — SIGNIFICANT CHANGE UP (ref 150–400)
POTASSIUM SERPL-MCNC: 4.2 MMOL/L — SIGNIFICANT CHANGE UP (ref 3.5–5.3)
POTASSIUM SERPL-SCNC: 4.2 MMOL/L — SIGNIFICANT CHANGE UP (ref 3.5–5.3)
PROT SERPL-MCNC: 6.4 G/DL — SIGNIFICANT CHANGE UP (ref 6–8.3)
RBC # BLD: 4.17 M/UL — SIGNIFICANT CHANGE UP (ref 3.8–5.2)
RBC # FLD: 13 % — SIGNIFICANT CHANGE UP (ref 10.3–14.5)
SODIUM SERPL-SCNC: 142 MMOL/L — SIGNIFICANT CHANGE UP (ref 135–145)
WBC # BLD: 7.11 K/UL — SIGNIFICANT CHANGE UP (ref 3.8–10.5)
WBC # FLD AUTO: 7.11 K/UL — SIGNIFICANT CHANGE UP (ref 3.8–10.5)

## 2023-01-05 PROCEDURE — 99214 OFFICE O/P EST MOD 30 MIN: CPT

## 2023-01-05 PROCEDURE — 99214 OFFICE O/P EST MOD 30 MIN: CPT | Mod: 25

## 2023-01-05 PROCEDURE — 31579 LARYNGOSCOPY TELESCOPIC: CPT

## 2023-01-05 RX ORDER — PEMBROLIZUMAB 50 MG/2ML
INJECTION, POWDER, LYOPHILIZED, FOR SOLUTION INTRAVENOUS
Refills: 0 | Status: ACTIVE | COMMUNITY

## 2023-01-05 RX ORDER — PANTOPRAZOLE 40 MG/1
40 TABLET, DELAYED RELEASE ORAL
Refills: 0 | Status: COMPLETED | COMMUNITY
End: 2023-01-05

## 2023-01-05 RX ORDER — SENNOSIDES 8.6 MG TABLETS 8.6 MG/1
TABLET ORAL
Refills: 0 | Status: COMPLETED | COMMUNITY
End: 2023-01-05

## 2023-01-05 RX ORDER — PETROLATUM 76 G/100G
OINTMENT TOPICAL 3 TIMES DAILY
Qty: 1 | Refills: 0 | Status: COMPLETED | COMMUNITY
Start: 2022-09-15 | End: 2023-01-05

## 2023-01-05 RX ORDER — FLUTICASONE PROPIONATE 50 UG/1
50 SPRAY, METERED NASAL DAILY
Qty: 48 | Refills: 0 | Status: COMPLETED | COMMUNITY
Start: 2022-12-22 | End: 2023-01-05

## 2023-01-05 RX ORDER — SOD CHLOR,BICARB/SQUEEZ BOTTLE
PACKET, WITH RINSE DEVICE NASAL
Qty: 1 | Refills: 3 | Status: COMPLETED | COMMUNITY
Start: 2022-12-22 | End: 2023-01-05

## 2023-01-05 RX ORDER — APIXABAN 2.5 MG/1
2.5 TABLET, FILM COATED ORAL TWICE DAILY
Refills: 0 | Status: COMPLETED | COMMUNITY
Start: 2022-10-07 | End: 2023-01-05

## 2023-01-05 RX ORDER — METOCLOPRAMIDE 10 MG/1
10 TABLET ORAL
Qty: 60 | Refills: 5 | Status: COMPLETED | COMMUNITY
Start: 2022-08-31 | End: 2023-01-05

## 2023-01-05 RX ORDER — BISACODYL 5 MG/1
5 TABLET, DELAYED RELEASE ORAL
Refills: 0 | Status: DISCONTINUED | COMMUNITY
End: 2023-01-05

## 2023-01-05 NOTE — CONSULT LETTER
[Dear  ___] : Dear  [unfilled], [Consult Letter:] : I had the pleasure of evaluating your patient, [unfilled]. [Consult Closing:] : Thank you very much for allowing me to participate in the care of this patient.  If you have any questions, please do not hesitate to contact me. [Sincerely,] : Sincerely, [FreeTextEntry2] : [[facility] provider] [FreeTextEntry1] : She was given several options for treating her vocal fold paralysis and will discuss with her family.   [FreeTextEntry3] : Meet Cox M.D.\par Division of Laryngology | Department of Otolaryngology \par [facility] \[place] [phone]

## 2023-01-05 NOTE — ASSESSMENT
[FreeTextEntry1] : Metastatic NSCLC with adenocarcinoma histology; tumor tested PDL1 Negative and lacks actionable mutations but tested TMB-High. Started first line single agent Pembrolizumab in Oct 2022. PET/CT December 2022 with [place]. Brain MRI Brain with appropriate response as per Dr. Funes.  Recommend:\par – Continue with  first-line systemic therapy with single agent Pembrolizumab. For C5 today. \par – F/U with Radiation oncology s/p GK-SRS to brain metastases \par – Established care with Palliative Care \par -Dysphagia: referred to speech and swallow by Dr. Funes/appt with [facility] today\par -Gait Imbalace/deconditioning: referred to PM&R by Dr. Funes\par – PE: Continue Eliquis anticoagulation indefinitely given PE and Afib.\par – Continue follow-up and management with her PCP/cardiologist for her other ongoing medical problems and comorbidities.\par -Back pain: Pt with h/o spinal stenosis.  Takes gabapentin once daily. May utilize Tylenol 1000mg tid prn. Not interested in PM&R\par -Iron deficiency:  On PO iron repletion.  Monitor hemoglobin. \par -Dry skin: recommend moisturizer; trial Claritin qhs \par -Follow up prior to treatment going forward. \par -Information sheet given to son with directions for making appointments. I will continue to see the patient on Thursdays to accommodate her son's schedule. They understand that appointments with Dr. Ramon followed by treatment will be on Tuesdays.

## 2023-01-05 NOTE — PROCEDURE
[Video Captured] : video captured and filed [de-identified] : Procedure: Transnasal flexible laryngoscopy \par Indication: Dysphonia, left vocal fold paralysis \par \par Description: Informed consent was obtained from the patient prior to the procedure. The patient was seated in the clinic chair. Topical anesthesia was achieved by first spraying the nasal cavities with 4% lidocaine and 1% phenylephrine. \par \par Exam: This demonstrates a clear vallecula and crisp epiglottis. The aryepiglottic folds are intact and symmetric bilaterally. The hypopharynx does not demonstrate pooling. The interarytenoid space demonstrates no lesions. It does not demonstrate pachydermia. The false vocal folds are symmetric and without lesions or masses. False fold voicing (plica ventricularis) is not noted today. The right true vocal fold shows normal motion. The left true vocal fold shows absence of mobility and is in the paramedian position. The right medial edge is crisp and shows no lesions or masses. The left medial edge is crisp and shows no lesions or masses. The mucosal covering is minimally edematous. There is not erythema present today. The vocal processes do not demonstrate granulomas. The subglottis and proximal trachea is clear and unobstructed to the limits of the examination today. There is substantial supraglottic muscle tension on phonation.\par \par Stroboscopic Laryngoscopy Procedure Note: \par Indication:	Assess laryngeal biomechanics and vocal fold oscillation. \par Description of Procedure:	Informed consent was verbally obtained from the patient prior to the procedure. The patient was seated in the clinic chair. Topical anesthesia was achieved by first spraying the nasal cavities with 4% lidocaine and nasal decongestant. \par \par Findings: \par Supraglottis: no masses or lesions \par Glottis:    Structure: \par                       Right: crisp and shows no lesions or masses \par                       Left:  crisp and shows no lesions or masses \par                Mobility: \par                       Right:  full \par                       Left:  absent \par                Amplitude: \par                       Right:  increased \par                       Left:  increased\par                Closure: complete \par                Wave symmetry:  symmetric \par Subglottis: no masses or lesions within the visualized subglottis Visualized airway is widely patent.

## 2023-01-05 NOTE — PROCEDURE
[Video Captured] : video captured and filed [de-identified] : Procedure: Transnasal flexible laryngoscopy \par Indication: Dysphonia, left vocal fold paralysis \par \par Description: Informed consent was obtained from the patient prior to the procedure. The patient was seated in the clinic chair. Topical anesthesia was achieved by first spraying the nasal cavities with 4% lidocaine and 1% phenylephrine. \par \par Exam: This demonstrates a clear vallecula and crisp epiglottis. The aryepiglottic folds are intact and symmetric bilaterally. The hypopharynx does not demonstrate pooling. The interarytenoid space demonstrates no lesions. It does not demonstrate pachydermia. The false vocal folds are symmetric and without lesions or masses. False fold voicing (plica ventricularis) is not noted today. The right true vocal fold shows normal motion. The left true vocal fold shows absence of mobility and is in the paramedian position. The right medial edge is crisp and shows no lesions or masses. The left medial edge is crisp and shows no lesions or masses. The mucosal covering is minimally edematous. There is not erythema present today. The vocal processes do not demonstrate granulomas. The subglottis and proximal trachea is clear and unobstructed to the limits of the examination today. There is substantial supraglottic muscle tension on phonation.\par \par Stroboscopic Laryngoscopy Procedure Note: \par Indication:	Assess laryngeal biomechanics and vocal fold oscillation. \par Description of Procedure:	Informed consent was verbally obtained from the patient prior to the procedure. The patient was seated in the clinic chair. Topical anesthesia was achieved by first spraying the nasal cavities with 4% lidocaine and nasal decongestant. \par \par Findings: \par Supraglottis: no masses or lesions \par Glottis:    Structure: \par                       Right: crisp and shows no lesions or masses \par                       Left:  crisp and shows no lesions or masses \par                Mobility: \par                       Right:  full \par                       Left:  absent \par                Amplitude: \par                       Right:  increased \par                       Left:  increased\par                Closure: complete \par                Wave symmetry:  symmetric \par Subglottis: no masses or lesions within the visualized subglottis Visualized airway is widely patent.

## 2023-01-05 NOTE — ASSESSMENT
[FreeTextEntry1] : 82F with a history of left lung cancer and newly diagnosed left vocal fold paralysis.  \par We went over the likely cause, her lung cancer, and possible treatment including:\par 1) Observation\par 2) Injection laryngoplasty in the office\par 3) Mediailzation in August 2023 or later\par 4) Voice therapy\par \par The risks, benefits, and alternatives were discussed and understanding expressed.\par \par They have elected to discuss these options and will reach out to my office with their preference.  As she had poor pain tolerance of the scope done today I do not think she would be a good candidate for injection laryngoplasty in the office.

## 2023-01-05 NOTE — PHYSICAL EXAM
[Normal] : mucosa is normal [Midline] : trachea located in midline position [de-identified] : Large maxillary torus, small mandibular jed

## 2023-01-05 NOTE — PHYSICAL EXAM
[Ambulatory and capable of all self care but unable to carry out any work activities] : Status 2- Ambulatory and capable of all self care but unable to carry out any work activities. Up and about more than 50% of waking hours [Normal] : affect appropriate [de-identified] : Elderly woman in wheelchair  [de-identified] : No icterus  [de-identified] : MMM O/P Clear  [de-identified] : No LAD  [de-identified] : Clear  [de-identified] : S1 S2 [de-identified] : LE 1+ pitting edema  [de-identified] : Soft NT [de-identified] : No spine tenderness

## 2023-01-05 NOTE — PHYSICAL EXAM
[Normal] : mucosa is normal [Midline] : trachea located in midline position [de-identified] : Large maxillary torus, small mandibular jed

## 2023-01-05 NOTE — PHYSICAL EXAM
[facility]  Discharge Summary    Mahogany Morales Patient Status:  Inpatient    1930 MRN [id number]   [facility] 3NE-A Attending Linda Holt MD   Hosp Day # 3 PCP Lina Farris MD     Date of Admission: 2020    Date reticular densities lower lobes are probably result of chronic postinflammatory scar. Superimposed pneumonia is not excluded without the benefit of a comparison study.     Dictated by (CST): Saira Sandy MD on 9/14/2020 at 2:46 PM     Finalized by (CST):          [facility]     Imaging: No results found.     Medications:   • Metoprolol Succinate ER  37.5 mg Oral Daily   • Montelukast Sodium  10 mg Oral Nightly   • dilTIAZem HCl ER Coated Beads  120 mg Oral Daily         ASSESSMENT / PLAN:   Pancytopenia w given hx of afib         Disposition- plan for DC today with home PT and HHA  DVT prophylaxis–SCDs      See tests ordered,  Available and radiology reviewed  All consultant notes reviewed  Discussed with nursing on floor, Beau   dvt prophylaxis reviewed [Ambulatory and capable of all self care but unable to carry out any work activities] : Status 2- Ambulatory and capable of all self care but unable to carry out any work activities. Up and about more than 50% of waking hours [Normal] : affect appropriate [de-identified] : Elderly woman in wheelchair  [de-identified] : No icterus  [de-identified] : MMM O/P Clear  [de-identified] : No LAD  [de-identified] : Clear  [de-identified] : S1 S2 [de-identified] : LE 1+ pitting edema  [de-identified] : Soft NT [de-identified] : No spine tenderness

## 2023-01-05 NOTE — CONSULT LETTER
[Dear  ___] : Dear  [unfilled], [Consult Letter:] : I had the pleasure of evaluating your patient, [unfilled]. [Consult Closing:] : Thank you very much for allowing me to participate in the care of this patient.  If you have any questions, please do not hesitate to contact me. [Sincerely,] : Sincerely, [FreeTextEntry2] : [[facility] provider] [FreeTextEntry1] : She was given several options for treating her vocal fold paralysis and will discuss with her family.   [FreeTextEntry3] : Meet Cox M.D.\par Division of Laryngology | Department of Otolaryngology \par [facility] \[place] [phone]

## 2023-01-05 NOTE — HISTORY OF PRESENT ILLNESS
[Disease: _____________________] : Disease: [unfilled] [AJCC Stage: ____] : AJCC Stage: [unfilled] [de-identified] : 82F former smoker with multiple comorbidities was undergoing orthopedic evaluation as an outpatient due to ongoing chronic back pain and gait imbalance.  She ultimately presented to [facility] ED on 7/19/2022.  Brain imaging demonstrated evidence of metastatic disease including a dominant right temporal lobe 3.1 cm enhancing mass with vasogenic edema.  CT scan of her body demonstrated a ANTHONY tumor with subcentimeter pulmonary nodules and a nonspecific adrenal nodule.  Bronchoscopy with biopsy performed on 7/20/2022 demonstrated adenocarcinoma consistent with lung primary; tumor tested PD-L1 negative and was negative for actionable mutations. however, tested TMB-High.  She underwent craniotomy resection of the dominant brain mass on 7/27/2022 with pathology revealing the same findings.  Hospital course was complicated by NSTEMI, Afib with RVR and RML PE.  She was put on medications for seizure prophylaxis.  She was subsequently discharged to acute rehab on 8/10–8/26/2022.  Treated with GK-SRS to brain metastases in Sept 2022. Started 1st line single agent Pembro in mid Oct 2022.  [de-identified] : – Left mainstem bronchus biopsy 7/20/2022: Poorly differentiated adenocarcinoma of lung origin.\par PD-L1 negative/0%.\par Foundation One:  No actionable mutations (TP53+, among others); TMB-High.  \par –Right brain tumor resection 7/27/2022: Metastatic non-small cell carcinoma, favor adenocarcinoma, consistent with lung origin.\par PD-L1 negative. [de-identified] : Patient started first-line single agent Pembrolizumab in Oct 2022; she presents today prior to C5.  \par \par Patient reports feeling fairly well. Reports dry, pruritic skin; denies rash. She experiences constipation alternating with diarrhea; she uses MiraLAX occasionally.  Denies headaches or nausea. She notes some persistent dysphagia since post surgery and saw ALTAF earlier today; states that there "is nothing they can do". She reports low appetite with desire for sweets; this is an issue since she is diabetic. She is still having trouble ambulating since surgery due to imbalance; reports long standing spinal stenosis and disc issues that cause back and leg pain. Discussed PM&R referral but she is not interested. Uses a wheelchair only when out for appointments. Her memory is poor. She complains of sore scalp. \par \par \par \par

## 2023-01-05 NOTE — ASSESSMENT
[FreeTextEntry1] : Metastatic NSCLC with adenocarcinoma histology; tumor tested PDL1 Negative and lacks actionable mutations but tested TMB-High. Started first line single agent Pembrolizumab in Oct 2022. PET/CT December 2022 with [place]. Brain MRI Brain with appropriate response as per Dr. Funes.  Recommend:\par – Continue with  first-line systemic therapy with single agent Pembrolizumab. For C5 today. \par – F/U with Radiation oncology s/p GK-SRS to brain metastases \par – Established care with Palliative Care \par -Dysphagia: referred to speech and swallow by Dr. Funes/appt with ALTAF today\par -Gait Imbalace/deconditioning: referred to PM&R by Dr. Funes\par – PE: Continue Eliquis anticoagulation indefinitely given PE and Afib.\par – Continue follow-up and management with her PCP/cardiologist for her other ongoing medical problems and comorbidities.\par -Back pain: Pt with h/o spinal stenosis.  Takes gabapentin once daily. May utilize Tylenol 1000mg tid prn. Not interested in PM&R\par -Iron deficiency:  On PO iron repletion.  Monitor hemoglobin. \par -Dry skin: recommend moisturizer; trial Claritin qhs \par -Follow up prior to treatment going forward. \par -Information sheet given to son with directions for making appointments. I will continue to see the patient on Thursdays to accommodate her son's schedule. They understand that appointments with Dr. Ramon followed by treatment will be on Tuesdays.

## 2023-01-05 NOTE — REASON FOR VISIT
[Initial Consultation] : an initial consultation for [Formal Caregiver] : formal caregiver [Family Member] : family member [FreeTextEntry2] : referred by Dr. Block for vocal cord paralysis.

## 2023-01-05 NOTE — HISTORY OF PRESENT ILLNESS
[de-identified] : LANIE BERTRAND is a 82 year old woman who presents to [facility] with dysphonia and known left vocal fold immobility. States her voice changed after her prior craniotomy July 2022.  States her voice will vary between normal to hoarse depending on the amount she speaks.  Reports this does not bother her.  She has a history of left upper lobe lung adenocarcinoma currently getting Pembrolizumab. Also has history of brain mets s/p craniotomy and resection of right temporal lesion on 7/27/2022. \par \par Patient is referred by Dr. Block who saw the patient on 12/22/22 and first identified vocal fold immobility.  He referred to me for possible injection laryngoplasty. \par \par PET scan completed 12/8/22. I have reviewed these images and agree with the report.\par \par Denies dysphagia (expect to large pills), odynophagia, dyspnea.\par Denies regurgitation of recently eaten foods. \par Denies recent weight loss.  \par Denies frequent classic reflux symptoms.

## 2023-01-05 NOTE — HISTORY OF PRESENT ILLNESS
[de-identified] : LANIE BERTRAND is a 82 year old woman who presents to [facility] with dysphonia and known left vocal fold immobility. States her voice changed after her prior craniotomy July 2022.  States her voice will vary between normal to hoarse depending on the amount she speaks.  Reports this does not bother her.  She has a history of left upper lobe lung adenocarcinoma currently getting Pembrolizumab. Also has history of brain mets s/p craniotomy and resection of right temporal lesion on 7/27/2022. \par \par Patient is referred by Dr. Block who saw the patient on 12/22/22 and first identified vocal fold immobility.  He referred to me for possible injection laryngoplasty. \par \par PET scan completed 12/8/22. I have reviewed these images and agree with the report.\par \par Denies dysphagia (expect to large pills), odynophagia, dyspnea.\par Denies regurgitation of recently eaten foods. \par Denies recent weight loss.  \par Denies frequent classic reflux symptoms.

## 2023-01-05 NOTE — HISTORY OF PRESENT ILLNESS
[Disease: _____________________] : Disease: [unfilled] [AJCC Stage: ____] : AJCC Stage: [unfilled] [de-identified] : 82F former smoker with multiple comorbidities was undergoing orthopedic evaluation as an outpatient due to ongoing chronic back pain and gait imbalance.  She ultimately presented to [facility] ED on 7/19/2022.  Brain imaging demonstrated evidence of metastatic disease including a dominant right temporal lobe 3.1 cm enhancing mass with vasogenic edema.  CT scan of her body demonstrated a ANTHONY tumor with subcentimeter pulmonary nodules and a nonspecific adrenal nodule.  Bronchoscopy with biopsy performed on 7/20/2022 demonstrated adenocarcinoma consistent with lung primary; tumor tested PD-L1 negative and was negative for actionable mutations. however, tested TMB-High.  She underwent craniotomy resection of the dominant brain mass on 7/27/2022 with pathology revealing the same findings.  Hospital course was complicated by NSTEMI, Afib with RVR and RML PE.  She was put on medications for seizure prophylaxis.  She was subsequently discharged to acute rehab on 8/10–8/26/2022.  Treated with GK-SRS to brain metastases in Sept 2022. Started 1st line single agent Pembro in mid Oct 2022.  [de-identified] : – Left mainstem bronchus biopsy 7/20/2022: Poorly differentiated adenocarcinoma of lung origin.\par PD-L1 negative/0%.\par Foundation One:  No actionable mutations (TP53+, among others); TMB-High.  \par –Right brain tumor resection 7/27/2022: Metastatic non-small cell carcinoma, favor adenocarcinoma, consistent with lung origin.\par PD-L1 negative. [de-identified] : Patient started first-line single agent Pembrolizumab in Oct 2022; she presents today prior to C5.  \par \par Patient reports feeling fairly well. Reports dry, pruritic skin; denies rash. She experiences constipation alternating with diarrhea; she uses MiraLAX occasionally.  Denies headaches or nausea. She notes some persistent dysphagia since post surgery and saw ALTAF earlier today; states that there "is nothing they can do". She reports low appetite with desire for sweets; this is an issue since she is diabetic. She is still having trouble ambulating since surgery due to imbalance; reports long standing spinal stenosis and disc issues that cause back and leg pain. Discussed PM&R referral but she is not interested. Uses a wheelchair only when out for appointments. Her memory is poor. She complains of sore scalp. \par \par \par \par

## 2023-01-05 NOTE — PROCEDURE
[Video Captured] : video captured and filed [de-identified] : Procedure: Transnasal flexible laryngoscopy \par Indication: Dysphonia, left vocal fold paralysis \par \par Description: Informed consent was obtained from the patient prior to the procedure. The patient was seated in the clinic chair. Topical anesthesia was achieved by first spraying the nasal cavities with 4% lidocaine and 1% phenylephrine. \par \par Exam: This demonstrates a clear vallecula and crisp epiglottis. The aryepiglottic folds are intact and symmetric bilaterally. The hypopharynx does not demonstrate pooling. The interarytenoid space demonstrates no lesions. It does not demonstrate pachydermia. The false vocal folds are symmetric and without lesions or masses. False fold voicing (plica ventricularis) is not noted today. The right true vocal fold shows normal motion. The left true vocal fold shows absence of mobility and is in the paramedian position. The right medial edge is crisp and shows no lesions or masses. The left medial edge is crisp and shows no lesions or masses. The mucosal covering is minimally edematous. There is not erythema present today. The vocal processes do not demonstrate granulomas. The subglottis and proximal trachea is clear and unobstructed to the limits of the examination today. There is substantial supraglottic muscle tension on phonation.\par \par Stroboscopic Laryngoscopy Procedure Note: \par Indication:	Assess laryngeal biomechanics and vocal fold oscillation. \par Description of Procedure:	Informed consent was verbally obtained from the patient prior to the procedure. The patient was seated in the clinic chair. Topical anesthesia was achieved by first spraying the nasal cavities with 4% lidocaine and nasal decongestant. \par \par Findings: \par Supraglottis: no masses or lesions \par Glottis:    Structure: \par                       Right: crisp and shows no lesions or masses \par                       Left:  crisp and shows no lesions or masses \par                Mobility: \par                       Right:  full \par                       Left:  absent \par                Amplitude: \par                       Right:  increased \par                       Left:  increased\par                Closure: complete \par                Wave symmetry:  symmetric \par Subglottis: no masses or lesions within the visualized subglottis Visualized airway is widely patent.

## 2023-01-05 NOTE — PHYSICAL EXAM
[Normal] : mucosa is normal [Midline] : trachea located in midline position [de-identified] : Large maxillary torus, small mandibular jed

## 2023-01-05 NOTE — HISTORY OF PRESENT ILLNESS
[de-identified] : LANIE BERTRAND is a 82 year old woman who presents to [facility] with dysphonia and known left vocal fold immobility. States her voice changed after her prior craniotomy July 2022.  States her voice will vary between normal to hoarse depending on the amount she speaks.  Reports this does not bother her.  She has a history of left upper lobe lung adenocarcinoma currently getting Pembrolizumab. Also has history of brain mets s/p craniotomy and resection of right temporal lesion on 7/27/2022. \par \par Patient is referred by Dr. Block who saw the patient on 12/22/22 and first identified vocal fold immobility.  He referred to me for possible injection laryngoplasty. \par \par PET scan completed 12/8/22. I have reviewed these images and agree with the report.\par \par Denies dysphagia (expect to large pills), odynophagia, dyspnea.\par Denies regurgitation of recently eaten foods. \par Denies recent weight loss.  \par Denies frequent classic reflux symptoms.

## 2023-01-05 NOTE — CONSULT LETTER
[Dear  ___] : Dear  [unfilled], [Consult Letter:] : I had the pleasure of evaluating your patient, [unfilled]. [Consult Closing:] : Thank you very much for allowing me to participate in the care of this patient.  If you have any questions, please do not hesitate to contact me. [Sincerely,] : Sincerely, [FreeTextEntry2] : [[facility] provider] [FreeTextEntry1] : She was given several options for treating her vocal fold paralysis and will discuss with her family.   [FreeTextEntry3] : Meet Cox M.D.\par Division of Laryngology | Department of Otolaryngology \par [facility] \[place] [phone]

## 2023-01-06 DIAGNOSIS — Z51.11 ENCOUNTER FOR ANTINEOPLASTIC CHEMOTHERAPY: ICD-10-CM

## 2023-01-06 LAB
T3 SERPL-MCNC: 162 NG/DL — SIGNIFICANT CHANGE UP (ref 80–200)
T4 FREE SERPL-MCNC: 1.3 NG/DL — SIGNIFICANT CHANGE UP (ref 0.9–1.8)
TSH SERPL-MCNC: 0.06 UIU/ML — LOW (ref 0.27–4.2)

## 2023-01-10 NOTE — REASON FOR VISIT
[Initial Consultation] : an initial consultation for [Formal Caregiver] : formal caregiver [Family Member] : family member [FreeTextEntry2] : dysphagia

## 2023-01-10 NOTE — HISTORY OF PRESENT ILLNESS
[de-identified] : 82 year old female, initial consultation for dysphagia \par Referred by Dr Funes (Radiologist) \par History of Small cell carcinoma with metastasis, brain tumor s/p brain surgery 7/22, completed RT 10/22 currently on Keytruda. \par Reports "congestion" in the throat after surgery. \par Reports pills get stuck when swallowing \par Currently tolerating regular diet and liquids. Weight is stable. \par Reports intermittent vocal changes. \par Family reports voice does sound different. \par Reports frequent throat clearing. \par Reports intermittent clear nasal discharge and frequent sneezing \par PET scan completed 12/8/22. \par Denies dysphagia, odynophagia, aspirations, dyspnea, otalgia.

## 2023-01-10 NOTE — ASSESSMENT
[FreeTextEntry1] : 82Y F with Left upper lobe lung adenocarcinoma currently getting Pembrolizumab and had brain mets s/p craniotomy and resection of R temporal lesion on 7/27/2022 present for Dysphonia. Indirect Laryngoscopy shows Left vocal fold paresis. Right Vocal fold full mobility with good compensation mild glottic gap on closure. Patient states has been having difficulty with voice over the course the day especially after a long day of talking. Patient denies any dysphagia to solid or liquid but does have some difficulty with large pills but is able to swallow with water.\par \par Recommend\par Left Vocal Fold Paralysis 2/2 Left upper lobe adenocarcinoma of the Lung\par -No history of aspiration or recurrent pneumonia\par -Referral to Dr. Meet Cox Laryngologist for evaluation of injection laryngoplasty\par \par Nasal Congestion\par -Send to pharmacy Flonase nasal spray to be used after completing daily Sinus Rinse\par -Discussed the importance of rinsing the sinus before using nasal spray so that excess mucus lining the nasal cavity can be wash away so medication can penetration the nose.\par -If normal saline sinus rinse + nasal spray is not effective can discuss medicated nasal rinses and imaging for additional evaluation\par \par -Return to clinic in 3 months or sooner if new/worsen symptoms present\par \par

## 2023-01-12 ENCOUNTER — APPOINTMENT (OUTPATIENT)
Dept: GERIATRICS | Facility: CLINIC | Age: 83
End: 2023-01-12

## 2023-01-18 NOTE — DATA REVIEWED
[FreeTextEntry1] : PET CT  (12/08/2022): \par \par COMPARISON:  FDG-PET/CT dated  9/9/2022\par \par OTHER STUDIES USED FOR CORRELATION: None\par \par FINDINGS:\par \par HEAD/NECK: Incompletely imaged right temporal craniotomy again noted. Resolution of FDG activity and overlying scalp, compatible with resolution of postsurgical inflammation. Photopenia in right temporal lobe corresponding to area of hypoattenuation suggestive of edema and/or encephalomalacia/postsurgical changes, unchanged.\par \par Asymmetric FDG activity in the vocal cords, present on the right, and absent on the left, unchanged, compatible with left vocal cord paralysis. No enlarged or FDG-avid cervical lymph node.\par \par CHEST: No FDG-avid mediastinal or hilar lymph node.\par \par LUNGS: FDG-avid left upper lobe mass abutting mediastinum at the level of the AP window is decreased in size and metabolism, measuring approximately 1.4 cm, SUV 5.6 (image 74); previously 2.7 cm, SUV 9.6.\par \par Interval decrease in mildly FDG-avid left upper lobe paramediastinal opacity, likely atelectasis, which is unchanged in metabolism (SUV 4.0; image 62; previous SUV 4.1).\par \par PLEURA/PERICARDIUM: No abnormal FDG activity. No effusion.\par \par HEPATOBILIARY/PANCREAS:  Physiologic FDG activity. Liver SUV mean is 2.9; previously 3.0.\par \par SPLEEN: Physiologic FDG activity. Normal in size.\par \par ADRENAL GLANDS: No abnormal FDG activity. No nodule.\par \par KIDNEYS/URINARY BLADDER: Physiologic excreted FDG activity.\par \par REPRODUCTIVE ORGANS: No abnormal FDG activity.\par \par ABDOMINOPELVIC NODES: No enlarged or FDG-avid lymph node.\par \par BOWEL/PERITONEUM/MESENTERY: Diffuse small bowel and pancolonic hypermetabolism, without corresponding abnormalities on CT, unchanged, likely related to metformin.\par \par BONES/SOFT TISSUES: No abnormal FDG activity. Right shoulder prosthesis. Nonspecific, mildly increased radiotracer activity in right greater than left glenohumeral joints, unchanged. FDG-avid degenerative change in lumbar spine.\par \par Nonspecific, mildly FDG-avid skin thickening, lower anterior abdominal wall, not significantly changed, likely reflects inflammation (SUV 2.0; image 159).\par \par IMPRESSION: Compared to FDG PET/CT dated 9/9/2022:\par \par 1. FDG-avid left upper lobe lung nodule is decreased in size and metabolism, compatible with a partial response to interval therapy. Interval decrease in adjacent left upper lobe paramediastinal opacity, likely atelectasis, which is unchanged in metabolism.\par \par 2. Right temporal craniotomy with photopenic low attenuation within right temporal lobe, compatible with vasogenic edema, gliosis, and/or postsurgical changes, better evaluated on contrast-enhanced CT or MRI, not significantly changed.\par \par 3. Findings suggestive of left vocal cord paralysis, likely secondary to left upper lobe lung mass, unchanged.\par \par 4. Nonspecific hypermetabolism in small bowel and colon, unchanged, likely related to metformin.\par \par 5. No scan evidence of metastatic disease.

## 2023-01-18 NOTE — HISTORY OF PRESENT ILLNESS
[FreeTextEntry1] : 82yoF with stage IV NSCLC presents for follow-up palliative care visit. \par PMH significant for NSTEMI, DM2, Afib with RVR and RML PE, chronic low back pain, former smoker. \par \par Patient reports that she was suffering with chronic low back pain and progressive gait instability, was evaluated by orthopedist in 2022. She underwent imaging of the spinerain imaging demonstrated evidence of metastatic disease including a dominant right temporal lobe 3.1 cm enhancing mass with vasogenic edema. CT scan of her body demonstrated a ANTHONY tumor with subcentimeter pulmonary nodules and a nonspecific adrenal nodule. Bronchoscopy with biopsy performed on 2022 demonstrated adenocarcinoma consistent with lung primary; tumor tested PD-L1 negative and was negative for actionable mutations. She underwent craniotomy resection of the dominant brain mass on 2022 with pathology revealing the same findings. Hospital course was complicated by NSTEMI, A. fib with RVR and RML PE. She was put on medications for seizure prophylaxis. She was put on anticoagulation and currently remains on prophylactic dosing of anticoagulation with enoxaparin 40 mg subcu daily, pending clearance for therapeutic anticoagulation by neurosurgery. She was subsequently discharged to acute rehab on 8/10–2022. Has initiated home PT services. \par \par Pt met with Dr. Funes yesterday for evaluation for RT. \par Patient presents to establish with palliative care early on. \par She reports fatigue and weakness as her major issues lately. \par \par Interval history (23):\par Patient seen for follow up in treatment room. Therapy continues with Pembroluzimab, overall tolerating it well.\par She reports a sharp, intense pain to her right hip and upper leg. Pain is always present but some days the intensity is worse than others. She is unable to stand for a prolonged time and can only walk short distances. \par She has tried OTC transdermal patches and analgesic creams, which provided no relief. Tylenol 1000mg provides her with minimal relief which she will take two times a day. Appetite is diminished, she tolerates two meals a day. She restricts her diet to "healthy items" which she finds less appealing. Bowels fluctuates, PRN Miralax. Sleep is interrupted to urinate overnight, typically once a night. Mood fluctuates, she shares January is a difficult month for her family. \par \par ROS:\par +mood labile\par +has been losing her hair (this precedes her cancer diagnosis)\par +appetite is low \par Denies n/v, constipation, trouble sleeping\par Remainder of ROS non-contributory\par \par Patient is , lives alone. She has one son who lives nearby (two other sons are ). She has a 24/7 HHA since being home, is requiring assistance with her ADLs. She has a shower chair, grab bars. Has not had any falls since being home. Prior to the surgery she was independent, driving. \par \par PMD/Cardiologist: Dr. Kimani Uribe \par Endocrinologist: Dr. Chaz Richardson\par \par I-STOP Ref#: [id number]

## 2023-01-18 NOTE — ASSESSMENT
[FreeTextEntry1] : 82yoF with:\par \par # Stage IV Lung Adenocarcinoma - On Keytruda since 10/11/22; s/p gamma knife surgery. Med Onc follow up. \par \par # Right hip/thigh pain - Increase Gabapentin to 300mg QHS. Discussed how medical cannabis may be beneficial.\par - Medical cannabis certification completed today. Provided cannabis education, overview of state program, and discussed adverse effects in detail. Counseled that vaporized cannabis is not the preferred route of administration due to the fact that both short-term and long-term risks associated with vaporizing oils are not yet fully understood. Recommend starting with 1:1 THC:CBD formulation at low dose of THC (~2mg/dose). THC:CBD compound creams may be beneficial. \par \par # Chronic lower back pain - Patient has a multitude of lumbar spine issues as indicated on MRI LS Spine (7/2022). C/w Tylenol 500-1000mg PRN. \par \par # Diminished appetite - Medical cannabis may be beneficial. Recommend small, frequent meals and being less restrictive with her diet. \par \par # Adjustment d/o - Empathetic listening and validation of patient's emotions provided. \par \par # Encounter for palliative care- \par Explained the role of palliative care in enhancing quality of life in the setting of serious illness.\par Health Care Proxy (HCP) form on file.\par \par Follow up in 4 weeks, call sooner with questions or issues.

## 2023-01-18 NOTE — PHYSICAL EXAM
[Sclera] : the sclera and conjunctiva were normal [Outer Ear] : the ears and nose were normal in appearance [Hearing Threshold Finger Rub Not [place]] : hearing was normal [Examination Of The Oral Cavity] : the lips and gums were normal [Neck Appearance] : the appearance of the neck was normal [Respiration, Rhythm And Depth] : normal respiratory rhythm and effort [Auscultation Breath Sounds / Voice Sounds] : lungs were clear to auscultation bilaterally [Heart Rate And Rhythm] : heart rate was normal and rhythm regular [Heart Sounds] : normal S1 and S2 [Edema] : there was no peripheral edema [Bowel Sounds] : normal bowel sounds [Abdomen Soft] : soft [Abdomen Tenderness] : non-tender [Skin Color & Pigmentation] : normal skin color and pigmentation [] : no rash [No Focal Deficits] : no focal deficits [FreeTextEntry1] : Tearful at times during the visit

## 2023-01-19 ENCOUNTER — APPOINTMENT (OUTPATIENT)
Dept: NEUROSURGERY | Facility: CLINIC | Age: 83
End: 2023-01-19

## 2023-01-24 ENCOUNTER — RESULT REVIEW (OUTPATIENT)
Age: 83
End: 2023-01-24

## 2023-01-24 ENCOUNTER — APPOINTMENT (OUTPATIENT)
Dept: INFUSION THERAPY | Facility: HOSPITAL | Age: 83
End: 2023-01-24

## 2023-01-24 ENCOUNTER — APPOINTMENT (OUTPATIENT)
Dept: HEMATOLOGY ONCOLOGY | Facility: CLINIC | Age: 83
End: 2023-01-24
Payer: MEDICARE

## 2023-01-24 VITALS
WEIGHT: 160.06 LBS | SYSTOLIC BLOOD PRESSURE: 156 MMHG | TEMPERATURE: 97.5 F | HEART RATE: 88 BPM | BODY MASS INDEX: 29.08 KG/M2 | OXYGEN SATURATION: 99 % | RESPIRATION RATE: 16 BRPM | DIASTOLIC BLOOD PRESSURE: 82 MMHG | HEIGHT: 62.01 IN

## 2023-01-24 LAB
ALBUMIN SERPL ELPH-MCNC: 4.2 G/DL — SIGNIFICANT CHANGE UP (ref 3.3–5)
ALP SERPL-CCNC: 99 U/L — SIGNIFICANT CHANGE UP (ref 40–120)
ALT FLD-CCNC: 7 U/L — LOW (ref 10–45)
ANION GAP SERPL CALC-SCNC: 15 MMOL/L — SIGNIFICANT CHANGE UP (ref 5–17)
AST SERPL-CCNC: 11 U/L — SIGNIFICANT CHANGE UP (ref 10–40)
BASOPHILS # BLD AUTO: 0.03 K/UL — SIGNIFICANT CHANGE UP (ref 0–0.2)
BASOPHILS NFR BLD AUTO: 0.5 % — SIGNIFICANT CHANGE UP (ref 0–2)
BILIRUB SERPL-MCNC: 0.3 MG/DL — SIGNIFICANT CHANGE UP (ref 0.2–1.2)
BUN SERPL-MCNC: 21 MG/DL — SIGNIFICANT CHANGE UP (ref 7–23)
CALCIUM SERPL-MCNC: 9.9 MG/DL — SIGNIFICANT CHANGE UP (ref 8.4–10.5)
CHLORIDE SERPL-SCNC: 103 MMOL/L — SIGNIFICANT CHANGE UP (ref 96–108)
CO2 SERPL-SCNC: 24 MMOL/L — SIGNIFICANT CHANGE UP (ref 22–31)
CREAT SERPL-MCNC: 0.82 MG/DL — SIGNIFICANT CHANGE UP (ref 0.5–1.3)
EGFR: 71 ML/MIN/1.73M2 — SIGNIFICANT CHANGE UP
EOSINOPHIL # BLD AUTO: 0.33 K/UL — SIGNIFICANT CHANGE UP (ref 0–0.5)
EOSINOPHIL NFR BLD AUTO: 5.2 % — SIGNIFICANT CHANGE UP (ref 0–6)
GLUCOSE SERPL-MCNC: 149 MG/DL — HIGH (ref 70–99)
HCT VFR BLD CALC: 37.5 % — SIGNIFICANT CHANGE UP (ref 34.5–45)
HGB BLD-MCNC: 12.3 G/DL — SIGNIFICANT CHANGE UP (ref 11.5–15.5)
IMM GRANULOCYTES NFR BLD AUTO: 0.5 % — SIGNIFICANT CHANGE UP (ref 0–0.9)
LYMPHOCYTES # BLD AUTO: 1.92 K/UL — SIGNIFICANT CHANGE UP (ref 1–3.3)
LYMPHOCYTES # BLD AUTO: 30.1 % — SIGNIFICANT CHANGE UP (ref 13–44)
MCHC RBC-ENTMCNC: 29.4 PG — SIGNIFICANT CHANGE UP (ref 27–34)
MCHC RBC-ENTMCNC: 32.8 G/DL — SIGNIFICANT CHANGE UP (ref 32–36)
MCV RBC AUTO: 89.7 FL — SIGNIFICANT CHANGE UP (ref 80–100)
MONOCYTES # BLD AUTO: 0.44 K/UL — SIGNIFICANT CHANGE UP (ref 0–0.9)
MONOCYTES NFR BLD AUTO: 6.9 % — SIGNIFICANT CHANGE UP (ref 2–14)
NEUTROPHILS # BLD AUTO: 3.63 K/UL — SIGNIFICANT CHANGE UP (ref 1.8–7.4)
NEUTROPHILS NFR BLD AUTO: 56.8 % — SIGNIFICANT CHANGE UP (ref 43–77)
NRBC # BLD: 0 /100 WBCS — SIGNIFICANT CHANGE UP (ref 0–0)
PLATELET # BLD AUTO: 267 K/UL — SIGNIFICANT CHANGE UP (ref 150–400)
POTASSIUM SERPL-MCNC: 4.7 MMOL/L — SIGNIFICANT CHANGE UP (ref 3.5–5.3)
POTASSIUM SERPL-SCNC: 4.7 MMOL/L — SIGNIFICANT CHANGE UP (ref 3.5–5.3)
PROT SERPL-MCNC: 6.3 G/DL — SIGNIFICANT CHANGE UP (ref 6–8.3)
RBC # BLD: 4.18 M/UL — SIGNIFICANT CHANGE UP (ref 3.8–5.2)
RBC # FLD: 13.3 % — SIGNIFICANT CHANGE UP (ref 10.3–14.5)
SODIUM SERPL-SCNC: 143 MMOL/L — SIGNIFICANT CHANGE UP (ref 135–145)
WBC # BLD: 6.38 K/UL — SIGNIFICANT CHANGE UP (ref 3.8–10.5)
WBC # FLD AUTO: 6.38 K/UL — SIGNIFICANT CHANGE UP (ref 3.8–10.5)

## 2023-01-24 PROCEDURE — 99214 OFFICE O/P EST MOD 30 MIN: CPT

## 2023-01-25 DIAGNOSIS — C34.12 MALIGNANT NEOPLASM OF UPPER LOBE, LEFT BRONCHUS OR LUNG: ICD-10-CM

## 2023-01-27 NOTE — ASSESSMENT
[FreeTextEntry1] : Metastatic NSCLC with adenocarcinoma histology; tumor tested PDL1 Negative and lacks actionable mutations but tested TMB-High.  Treated with GK-SRS for brain metastases in Sept 2022.  Started first line single agent Pembrolizumab in Oct 2022; achieved [place]. Recommend:\par – Continue with first-line systemic therapy with single agent Pembrolizumab. For C6 today. \par – Brain Metastases:  F/U with Radiation oncology s/p GK-SRS to brain metastases.  Has Brain MRI ordered for March.  Rad-Onc referring her to Neuro-Onc given c/o HA’s and word-finding difficulty.  Could consider role of moving up Brain MRI. \par – Established care with Palliative Care \par – PE: Continue Eliquis anticoagulation indefinitely given PE and Afib.\par – Continue follow-up and management with her PCP/cardiologist for her other ongoing medical problems and comorbidities.\par -Back pain: Pt with h/o spinal stenosis.  Takes gabapentin once daily. May utilize Tylenol 1000mg tid prn. Not interested in PM&R\par -Iron deficiency:  On PO iron repletion.  Monitor hemoglobin. \par -Dermatitis:  recommended frequent moisturizer and OTC cortisone cream \par -Vocal fold paralysis:  follows with ENT.  Considering options however hoarseness is not significant currently\par -Follow up prior to each cycle.  Restaging scan ordered for March.

## 2023-01-27 NOTE — REASON FOR VISIT
[Follow-Up Visit] : a follow-up [Family Member] : family member [Formal Caregiver] : formal caregiver [FreeTextEntry2] : Lung Cancer

## 2023-01-27 NOTE — PHYSICAL EXAM
[Ambulatory and capable of all self care but unable to carry out any work activities] : Status 2- Ambulatory and capable of all self care but unable to carry out any work activities. Up and about more than 50% of waking hours [Normal] : affect appropriate [de-identified] : Soft NT [de-identified] : Elderly woman in wheelchair  [de-identified] : No icterus  [de-identified] : MMM O/P Clear  [de-identified] : No LAD  [de-identified] : Clear  [de-identified] : S1 S2 [de-identified] : LE 1+ pitting edema  [de-identified] : No spine tenderness

## 2023-01-27 NOTE — HISTORY OF PRESENT ILLNESS
[Disease: _____________________] : Disease: [unfilled] [AJCC Stage: ____] : AJCC Stage: [unfilled] [de-identified] : 82F former smoker with multiple comorbidities was undergoing orthopedic evaluation as an outpatient due to ongoing chronic back pain and gait imbalance.  She ultimately presented to [facility] ED on 7/19/2022.  Brain imaging demonstrated evidence of metastatic disease including a dominant right temporal lobe 3.1 cm enhancing mass with vasogenic edema.  CT scan of her body demonstrated a ANTHONY tumor with subcentimeter pulmonary nodules and a nonspecific adrenal nodule.  Bronchoscopy with biopsy performed on 7/20/2022 demonstrated adenocarcinoma consistent with lung primary; tumor tested PD-L1 negative and was negative for actionable mutations. however, tested TMB-High.  She underwent craniotomy resection of the dominant brain mass on 7/27/2022 with pathology revealing the same findings.  Hospital course was complicated by NSTEMI, Afib with RVR and RML PE.  She was put on medications for seizure prophylaxis.  She was subsequently discharged to acute rehab on 8/10–8/26/2022.  Treated with GK-SRS to brain metastases in Sept 2022. Started 1st line single agent Pembro in Oct 2022; achieved [place].  [de-identified] : – Left mainstem bronchus biopsy 7/20/2022: Poorly differentiated adenocarcinoma of lung origin.\par PD-L1 negative/0%.\par Foundation One:  No actionable mutations (TP53+, among others); TMB-High.  \par –Right brain tumor resection 7/27/2022: Metastatic non-small cell carcinoma, favor adenocarcinoma, consistent with lung origin.\par PD-L1 negative. [de-identified] : Patient started first-line single agent Pembrolizumab in Oct 2022; achieved [place].  She presents prior to planned treatment with C6. \par \par Patient and her family agree that she has been clinically improved on therapy.  She complains of pruritus of her trunk and scalp.  Discussed using moisturizer on the skin and an OTC cortisone cream.\par She complains of worsening headaches and dysarthria where she has word finding difficulty.  They reached out to radiation oncology and are being referred to neurooncology.\par She denies dysphagia and only reports difficulty swallowing large pills.  She is not hoarse today.  She saw ENT earlier this month for left vocal fold paralysis and are considering options, but likely will pursue observation given lack of significant hoarseness.\par \par \par \par

## 2023-01-27 NOTE — RESULTS/DATA
[FreeTextEntry1] : -CT Brain 7/18/22:  Cystic versus a necrotic masses measure approximately 4.8 x 3.2 x 3.2 cm in the right temporal lobe and 1.4 x 1.3 x 1.6 cm in the medial left frontal lobe.  Moderate to large amount of vasogenic edema in the right frontal, parietal and temporal lobes. Trace edema in the left frontal lobe.  Regional mass effect in the right cerebral hemisphere with partial effacement of the ventricular system and 5 mm subfalcine herniation of the right frontal lobe underneath the cerebral falx.  The findings may represent intracranial metastatic disease versus glioblastoma. Contrast-enhanced brain MRI is recommended for further evaluation.\par \par -CT C/A/P 7/18/22:  Left upper lobe lung mass with partial atelectasis of the left upper lobe, concerning for primary lung neoplasm. Few scattered sub-4 mm pulmonary nodules, indeterminate.  Indeterminant left adrenal nodule for which contrast enhanced MRI is recommended for further evaluation.\par \par -MRI Brain 7/20/22:  Right temporal lobe peripherally enhancing, centrally necrotic 3.1 x 2.6 cm mass demonstrating mild diffusion restriction. There is surrounding vasogenic edema. 8 mm leftward midline shift. Right uncal herniation. Additional left parafalcine 1.3 x 0.9 cm rim-enhancing cystic lesion with mild surrounding vasogenic edema. Tiny 0.3 x 0.3 cm rim-enhancing lesion within the left frontal cortex. Findings are concerning for metastatic disease in the context of patient's known primary lung cancer. The left lateral ventricle is mildly dilated.\par \par -LE Dopplers 7/24/22:  No evidence of deep venous thrombosis in either lower extremity.\par -LE Dopplers 7/28/22:  No evidence of deep venous thrombosis in either lower extremity.\par \par -CT Chest Angio 8/3/22:  Pulmonary embolism in the lateral segmental branch of the right middle lobe. No evidence of right heart strain.  Interval decrease in left upper lobe mass.  Unchanged obstruction of the posterior bronchus, and stable fibroglandular lymph node.\par \par -Abdominal U/S 8/4/22:   Mildly distended gallbladder. No evidence of acute cholecystitis.  Possible mild hydronephrosis.\par \par -LE Dopplers 8/7/22:  No evidence of deep venous thrombosis in either lower extremity.\par \par -LE Dopplers 9/1/22:  No evidence of deep venous thrombosis in either lower extremity.\par \par -PET/CT 9/9/22:  Abnormal FDG-PET/CT scan.\par 1. FDG-avid left upper lobe lung mass abutting the mediastinum at level of AP window, with associated atelectasis along the mediastinum, unchanged as compared to CT dated 8/3/2022, corresponds to patient's known adenocarcinoma.\par 2. Right temporal craniotomy with photopenic low attenuation within right temporal lobe, compatible with vasogenic edema, gliosis, and/or postsurgical changes, better evaluated on contrast-enhanced CT or MRI.\par 3. Findings suggestive of left vocal cord paralysis, likely secondary to left upper lobe lung mass. Please correlate clinically and with direct visualization, as indicated.\par 4. Nonspecific hypermetabolism in small bowel and colon likely is related to metformin.\par 5. No scan evidence of metastatic disease.\par \par -PET/CT 12/8/22: \par 1. FDG-avid left upper lobe lung nodule is decreased in size and metabolism, compatible with a partial response to interval therapy. Interval decrease in adjacent left upper lobe paramediastinal opacity, likely atelectasis, which is unchanged in metabolism.\par 2. Right temporal craniotomy with photopenic low attenuation within right temporal lobe, compatible with vasogenic edema, gliosis, and/or postsurgical changes, better evaluated on contrast-enhanced CT or MRI, not significantly changed.\par 3. 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 Nonspecific hypermetabolism in small bowel and colon, unchanged, likely related to metformin.\par 5. No scan evidence of metastatic disease.\par \par -MRI 12/8/22:  Interval decrease in size and peripheral enhancement of the medial left frontal lesion. Smaller ring-enhancing lesion more laterally along the left frontal cortex no longer visualized.  No new lesions seen.  Postsurgical cavity right temporal lobe with mild adjacent enhancement. Continued follow-up recommended.\par \par

## 2023-02-02 ENCOUNTER — APPOINTMENT (OUTPATIENT)
Dept: NEUROLOGY | Facility: CLINIC | Age: 83
End: 2023-02-02
Payer: MEDICARE

## 2023-02-02 ENCOUNTER — APPOINTMENT (OUTPATIENT)
Dept: HEMATOLOGY ONCOLOGY | Facility: CLINIC | Age: 83
End: 2023-02-02

## 2023-02-02 ENCOUNTER — NON-APPOINTMENT (OUTPATIENT)
Age: 83
End: 2023-02-02

## 2023-02-02 ENCOUNTER — RESULT REVIEW (OUTPATIENT)
Age: 83
End: 2023-02-02

## 2023-02-02 VITALS
RESPIRATION RATE: 16 BRPM | OXYGEN SATURATION: 98 % | HEART RATE: 68 BPM | TEMPERATURE: 97.8 F | SYSTOLIC BLOOD PRESSURE: 102 MMHG | DIASTOLIC BLOOD PRESSURE: 70 MMHG

## 2023-02-02 DIAGNOSIS — R26.89 OTHER ABNORMALITIES OF GAIT AND MOBILITY: ICD-10-CM

## 2023-02-02 DIAGNOSIS — I48.91 UNSPECIFIED ATRIAL FIBRILLATION: ICD-10-CM

## 2023-02-02 LAB
BASOPHILS # BLD AUTO: 0.04 K/UL — SIGNIFICANT CHANGE UP (ref 0–0.2)
BASOPHILS NFR BLD AUTO: 0.5 % — SIGNIFICANT CHANGE UP (ref 0–2)
EOSINOPHIL # BLD AUTO: 0.17 K/UL — SIGNIFICANT CHANGE UP (ref 0–0.5)
EOSINOPHIL NFR BLD AUTO: 2.2 % — SIGNIFICANT CHANGE UP (ref 0–6)
HCT VFR BLD CALC: 39.5 % — SIGNIFICANT CHANGE UP (ref 34.5–45)
HGB BLD-MCNC: 12.9 G/DL — SIGNIFICANT CHANGE UP (ref 11.5–15.5)
IMM GRANULOCYTES NFR BLD AUTO: 0.6 % — SIGNIFICANT CHANGE UP (ref 0–0.9)
LYMPHOCYTES # BLD AUTO: 1.56 K/UL — SIGNIFICANT CHANGE UP (ref 1–3.3)
LYMPHOCYTES # BLD AUTO: 20.1 % — SIGNIFICANT CHANGE UP (ref 13–44)
MCHC RBC-ENTMCNC: 29.4 PG — SIGNIFICANT CHANGE UP (ref 27–34)
MCHC RBC-ENTMCNC: 32.7 G/DL — SIGNIFICANT CHANGE UP (ref 32–36)
MCV RBC AUTO: 90 FL — SIGNIFICANT CHANGE UP (ref 80–100)
MONOCYTES # BLD AUTO: 0.25 K/UL — SIGNIFICANT CHANGE UP (ref 0–0.9)
MONOCYTES NFR BLD AUTO: 3.2 % — SIGNIFICANT CHANGE UP (ref 2–14)
NEUTROPHILS # BLD AUTO: 5.71 K/UL — SIGNIFICANT CHANGE UP (ref 1.8–7.4)
NEUTROPHILS NFR BLD AUTO: 73.4 % — SIGNIFICANT CHANGE UP (ref 43–77)
NRBC # BLD: 0 /100 WBCS — SIGNIFICANT CHANGE UP (ref 0–0)
PLATELET # BLD AUTO: 269 K/UL — SIGNIFICANT CHANGE UP (ref 150–400)
RBC # BLD: 4.39 M/UL — SIGNIFICANT CHANGE UP (ref 3.8–5.2)
RBC # FLD: 13.2 % — SIGNIFICANT CHANGE UP (ref 10.3–14.5)
WBC # BLD: 7.78 K/UL — SIGNIFICANT CHANGE UP (ref 3.8–10.5)
WBC # FLD AUTO: 7.78 K/UL — SIGNIFICANT CHANGE UP (ref 3.8–10.5)

## 2023-02-02 PROCEDURE — 99215 OFFICE O/P EST HI 40 MIN: CPT

## 2023-02-02 RX ORDER — INSULIN GLARGINE 100 [IU]/ML
100 INJECTION, SOLUTION SUBCUTANEOUS
Refills: 0 | Status: ACTIVE | COMMUNITY
Start: 2022-09-06

## 2023-02-02 NOTE — PHYSICAL EXAM
[FreeTextEntry1] : \par Exam as below (with documented exceptions in parentheses):\par \par General:  Healthy appearing woman well groomed and without deformities. KPS is 80\par \par Mental Status:  Awake, alert and attentive. Oriented to person, place and time. Recent and remote memory intact. Normal concentration. Fluent spontaneous speech with intact naming and repetition. Normal fund of knowledge.  \par \par Cranial Nerves: II: Full visual fields. III,IV,VI:Pupils round, reactive to light. Full extraocular movements. No nystagmus. V: Normal bilateral bite, facial sensation. VII: No facial weakness. VIII: Hearing intact. IX,X: Palate midline, intact gag. XI:  Sternocleidomastoids normal. XII: Tongue protrudes midline. No dysarthria. \par \par Motor:  Normal tone, bulk and power throughout including arms and legs, proximal and distal. No pronator drift. No abnormal movements. \par \par Sensation: Normal in arms, legs and trunk to pin, proprioception and vibration. Negative Romberg. \par \par Coordination: No dysmetria or dysdiadochokinesis bilaterally. Normal heel-shin testing. \par \par Gait: Normal including heel and toe walking. Normal station. \par \par Reflexes: Normoactive and symmetric throughout. Absent Babinski bilaterally. \par \par Vascular: No peripheral edema/calf tenderness. No carotid bruits\par \par Eyes: Funduscopic exam without papilledema (deferred)\par \par Heart: Regular rate and rhythm. Normal s1-s2. No murmurs, rubs or gallops. \par \par Respiratory: Lungs clear to auscultation bilaterally. \par \par Abdomen: Nontender, non-distended. No hepatosplenomegaly. Normal bowel sounds in four quadrants. \par \par

## 2023-02-02 NOTE — DATA REVIEWED
[de-identified] : I reviewed pre and post oeprative scans from 7/22 showing resection of right temporal tumor, and SRS planning scan 9/22 showing cystic left frontal parasag lesion and small left frontal lateral lesion, both improved on 12/22 MRI without edema

## 2023-02-02 NOTE — DISCUSSION/SUMMARY
[FreeTextEntry1] : Episodic aphasia.  She has vascular risk (albeit on eliquis,however also history of carotid disease and smoking), and interictal spikes on EEG at time of surgery but on RIGHT side.  Left sided lesions seem unlikely to be cause of symptoms given size and edema.  \par \par APHASIA:\par new MRI pending.  CUS ordered.  Lipids today.  Continue eliquis.  \par \par POSSIBLE EPILEPSY:\par increase keppra to 750 BID. \par \par BRAIN METS:\par MRI pending\par \par RTC 3w

## 2023-02-02 NOTE — HISTORY OF PRESENT ILLNESS
[FreeTextEntry1] : This 82 year old right handed woman is seen at the request of Dr. Funes for my advice and opinion regarding episodic aphasia\par \par She has NSCLC.  She presented with headaches, and was found to have a dominant right temporal and two small left frontal brain metastases.  The dominant lesion was resected by Dr. Loomis in 7/22, followed by SRS to cavity and two small lesions in left frontal lobe in 9/22 with good response.  She started pembrolizumab under Dr. Lay's care. EEG at time of diagnosis with RIGHT temporal sharps. \par \par She has had 3 episodes of transient expressive aphasia lasting 5-10 minutes, in 9/22, 10/22 and 1/23. One occurred off eliquis, but the other two occurred on eliquis and keppra. She's been on keppra since surgery. She had no language issues following surgery, but does note that her handwriting (right hand) has become worse.  She's had intermittent headaches. \par \par PMH:  Cardiac stents in 1990s.  MI.  PE.  Afib, \par \par PSH:  CEA on RIGHT.  \par \par SHX:  Office work.  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  no etoh. here with son and aide.

## 2023-02-07 ENCOUNTER — NON-APPOINTMENT (OUTPATIENT)
Age: 83
End: 2023-02-07

## 2023-02-07 LAB
CHOLEST SERPL-MCNC: 125 MG/DL
HDLC SERPL-MCNC: 61 MG/DL
LDLC SERPL CALC-MCNC: 28 MG/DL
NONHDLC SERPL-MCNC: 63 MG/DL
TRIGL SERPL-MCNC: 175 MG/DL

## 2023-02-08 RX ORDER — LEVETIRACETAM 500 MG/1
500 TABLET, FILM COATED ORAL TWICE DAILY
Qty: 60 | Refills: 3 | Status: DISCONTINUED | COMMUNITY
Start: 2022-09-19 | End: 2023-02-08

## 2023-02-09 ENCOUNTER — APPOINTMENT (OUTPATIENT)
Dept: NEUROSURGERY | Facility: CLINIC | Age: 83
End: 2023-02-09
Payer: MEDICARE

## 2023-02-09 VITALS
BODY MASS INDEX: 28.89 KG/M2 | OXYGEN SATURATION: 96 % | HEART RATE: 75 BPM | WEIGHT: 157 LBS | SYSTOLIC BLOOD PRESSURE: 122 MMHG | HEIGHT: 62 IN | TEMPERATURE: 97.1 F | DIASTOLIC BLOOD PRESSURE: 71 MMHG

## 2023-02-09 PROCEDURE — 99213 OFFICE O/P EST LOW 20 MIN: CPT

## 2023-02-09 NOTE — PHYSICAL EXAM
[General Appearance - Alert] : alert [General Appearance - In No Acute Distress] : in no acute distress [Oriented To Time, Place, And Person] : oriented to person, place, and time [Impaired Insight] : insight and judgment were intact [Affect] : the affect was normal [Person] : oriented to person [Place] : oriented to place [Time] : oriented to time [I: Normal Smell] : smell intact bilaterally [Cranial Nerves Optic (II)] : visual acuity intact bilaterally,  pupils equal round and reactive to light [Cranial Nerves Oculomotor (III)] : extraocular motion intact [Cranial Nerves Trigeminal (V)] : facial sensation intact symmetrically [Cranial Nerves Facial (VII)] : face symmetrical [Cranial Nerves Vestibulocochlear (VIII)] : hearing was intact bilaterally [Cranial Nerves Glossopharyngeal (IX)] : tongue and palate midline [Cranial Nerves Accessory (XI - Cranial And Spinal)] : head turning and shoulder shrug symmetric [Cranial Nerves Hypoglossal (XII)] : there was no tongue deviation with protrusion

## 2023-02-15 NOTE — RESULTS/DATA
[FreeTextEntry1] : \par IMPRESSION:\par Interval decrease in size and peripheral enhancement of the medial left frontal lesion. Smaller ring-enhancing lesion more laterally along the left frontal cortex no longer visualized.\par No new lesions seen\par Postsurgical cavity right temporal lobe with mild adjacent enhancement. Continued follow-up recommended.

## 2023-02-15 NOTE — HISTORY OF PRESENT ILLNESS
[FreeTextEntry1] : 2/9/23:  83 yo female with PMH of  chronic low back pain, Afib on Eliquis, NSCLC, s/p 7/27/22 R craniotomy for resection of Right temporal lesion, Path:  metastatic NSCLC, s/p 9/2022 GKRS.  She is here for 3 month f/u and to review repeat MRI brain 02/08. Pt is schedule for PET CT and repeat MRI brain at the end Feb and early March. She is now following with neuro onc and heme/onc. She is still on chemotherapy. She reports occasional HA. Denies any seizures, AMS, n/v, SOB, CP, loss of vision. \par \par 11/3/22\par Today she is here for follow up. SHe is doing well has finished the radiation. She complains of dryness of the skull and being tired. She is walking with a walker and has a HHA. She denies fevers, chills or night sweats. \par She also complains of long standing low back pain. She has not had help with PT or injections in the past.\par \par 82F former smoker with multiple comorbidities was undergoing orthopedic evaluation as an outpatient due to ongoing chronic back pain and gait imbalance.  She ultimately presented to [facility] ED on 7/19/2022.  Brain imaging demonstrated evidence of metastatic disease including a dominant right temporal lobe 3.1 cm enhancing mass with vasogenic edema.  CT scan of her body demonstrated a ANTHONY tumor with subcentimeter pulmonary nodules and a nonspecific adrenal nodule.  Bronchoscopy with biopsy performed on 7/20/2022 demonstrated adenocarcinoma consistent with lung primary; tumor tested PD-L1 negative and was negative for actionable mutations.  She underwent craniotomy resection of the dominant brain mass on 7/27/2022 with pathology revealing the same findings.  Hospital course was complicated by NSTEMI, A. fib with RVR and RML PE.  She was put on medications for seizure prophylaxis.  She was put on anticoagulation and currently remains on prophylactic dosing of anticoagulation with enoxaparin 40 mg subcu daily, pending clearance for therapeutic anticoagulation by neurosurgery.  She was subsequently discharged to acute rehab on 8/10–8/26/2022.  Treated with GK-SRS to brain metastases in Sept 2022.  \par

## 2023-02-15 NOTE — ASSESSMENT
[FreeTextEntry1] : Mrs. Phan is  s/p 7/27/22 R craniotomy for resection of Right temporal lesion, Path: metastatic NSCLC, s/p 9/2022 GKRS. MRI brain 02/08 stable with no new findings. Pt to continue with therapy and following with heme onc. Will undergo PET CT and repeat MRI brain in March. Continue treatment and f/u in 1 year.

## 2023-02-16 ENCOUNTER — RESULT REVIEW (OUTPATIENT)
Age: 83
End: 2023-02-16

## 2023-02-16 ENCOUNTER — APPOINTMENT (OUTPATIENT)
Dept: GERIATRICS | Facility: CLINIC | Age: 83
End: 2023-02-16
Payer: MEDICARE

## 2023-02-16 ENCOUNTER — APPOINTMENT (OUTPATIENT)
Dept: INFUSION THERAPY | Facility: HOSPITAL | Age: 83
End: 2023-02-16

## 2023-02-16 ENCOUNTER — APPOINTMENT (OUTPATIENT)
Dept: HEMATOLOGY ONCOLOGY | Facility: CLINIC | Age: 83
End: 2023-02-16
Payer: MEDICARE

## 2023-02-16 LAB
ALBUMIN SERPL ELPH-MCNC: 3.9 G/DL — SIGNIFICANT CHANGE UP (ref 3.3–5)
ALP SERPL-CCNC: 92 U/L — SIGNIFICANT CHANGE UP (ref 40–120)
ALT FLD-CCNC: 8 U/L — LOW (ref 10–45)
ANION GAP SERPL CALC-SCNC: 13 MMOL/L — SIGNIFICANT CHANGE UP (ref 5–17)
AST SERPL-CCNC: 19 U/L — SIGNIFICANT CHANGE UP (ref 10–40)
BASOPHILS # BLD AUTO: 0.03 K/UL — SIGNIFICANT CHANGE UP (ref 0–0.2)
BASOPHILS NFR BLD AUTO: 0.5 % — SIGNIFICANT CHANGE UP (ref 0–2)
BILIRUB SERPL-MCNC: 0.4 MG/DL — SIGNIFICANT CHANGE UP (ref 0.2–1.2)
BUN SERPL-MCNC: 19 MG/DL — SIGNIFICANT CHANGE UP (ref 7–23)
CALCIUM SERPL-MCNC: 9.3 MG/DL — SIGNIFICANT CHANGE UP (ref 8.4–10.5)
CHLORIDE SERPL-SCNC: 103 MMOL/L — SIGNIFICANT CHANGE UP (ref 96–108)
CO2 SERPL-SCNC: 23 MMOL/L — SIGNIFICANT CHANGE UP (ref 22–31)
CREAT SERPL-MCNC: 0.81 MG/DL — SIGNIFICANT CHANGE UP (ref 0.5–1.3)
EGFR: 72 ML/MIN/1.73M2 — SIGNIFICANT CHANGE UP
EOSINOPHIL # BLD AUTO: 0.25 K/UL — SIGNIFICANT CHANGE UP (ref 0–0.5)
EOSINOPHIL NFR BLD AUTO: 3.8 % — SIGNIFICANT CHANGE UP (ref 0–6)
GLUCOSE SERPL-MCNC: 183 MG/DL — HIGH (ref 70–99)
HCT VFR BLD CALC: 36.3 % — SIGNIFICANT CHANGE UP (ref 34.5–45)
HGB BLD-MCNC: 11.9 G/DL — SIGNIFICANT CHANGE UP (ref 11.5–15.5)
IMM GRANULOCYTES NFR BLD AUTO: 0.5 % — SIGNIFICANT CHANGE UP (ref 0–0.9)
LYMPHOCYTES # BLD AUTO: 2.02 K/UL — SIGNIFICANT CHANGE UP (ref 1–3.3)
LYMPHOCYTES # BLD AUTO: 30.7 % — SIGNIFICANT CHANGE UP (ref 13–44)
MCHC RBC-ENTMCNC: 29.8 PG — SIGNIFICANT CHANGE UP (ref 27–34)
MCHC RBC-ENTMCNC: 32.8 G/DL — SIGNIFICANT CHANGE UP (ref 32–36)
MCV RBC AUTO: 91 FL — SIGNIFICANT CHANGE UP (ref 80–100)
MONOCYTES # BLD AUTO: 0.46 K/UL — SIGNIFICANT CHANGE UP (ref 0–0.9)
MONOCYTES NFR BLD AUTO: 7 % — SIGNIFICANT CHANGE UP (ref 2–14)
NEUTROPHILS # BLD AUTO: 3.79 K/UL — SIGNIFICANT CHANGE UP (ref 1.8–7.4)
NEUTROPHILS NFR BLD AUTO: 57.5 % — SIGNIFICANT CHANGE UP (ref 43–77)
NRBC # BLD: 0 /100 WBCS — SIGNIFICANT CHANGE UP (ref 0–0)
PLATELET # BLD AUTO: 233 K/UL — SIGNIFICANT CHANGE UP (ref 150–400)
POTASSIUM SERPL-MCNC: 5.1 MMOL/L — SIGNIFICANT CHANGE UP (ref 3.5–5.3)
POTASSIUM SERPL-SCNC: 5.1 MMOL/L — SIGNIFICANT CHANGE UP (ref 3.5–5.3)
PROT SERPL-MCNC: 6 G/DL — SIGNIFICANT CHANGE UP (ref 6–8.3)
RBC # BLD: 3.99 M/UL — SIGNIFICANT CHANGE UP (ref 3.8–5.2)
RBC # FLD: 13.1 % — SIGNIFICANT CHANGE UP (ref 10.3–14.5)
SODIUM SERPL-SCNC: 139 MMOL/L — SIGNIFICANT CHANGE UP (ref 135–145)
T4 FREE SERPL-MCNC: 1.5 NG/DL — SIGNIFICANT CHANGE UP (ref 0.9–1.8)
TSH SERPL-MCNC: 0.04 UIU/ML — LOW (ref 0.27–4.2)
WBC # BLD: 6.58 K/UL — SIGNIFICANT CHANGE UP (ref 3.8–10.5)
WBC # FLD AUTO: 6.58 K/UL — SIGNIFICANT CHANGE UP (ref 3.8–10.5)

## 2023-02-16 PROCEDURE — 99213 OFFICE O/P EST LOW 20 MIN: CPT

## 2023-02-16 PROCEDURE — 99215 OFFICE O/P EST HI 40 MIN: CPT

## 2023-02-17 DIAGNOSIS — E05.90 THYROTOXICOSIS, UNSPECIFIED WITHOUT THYROTOXIC CRISIS OR STORM: ICD-10-CM

## 2023-02-17 NOTE — REVIEW OF SYSTEMS
[Joint Pain] : joint pain [Negative] : Heme/Lymph [FreeTextEntry9] : Low back pain (chronic) [de-identified] : (+) pruritis. Dry skin [de-identified] : difficulty word finding (+) HA

## 2023-02-17 NOTE — HISTORY OF PRESENT ILLNESS
[Disease: _____________________] : Disease: [unfilled] [AJCC Stage: ____] : AJCC Stage: [unfilled] [de-identified] : 82F former smoker with multiple comorbidities was undergoing orthopedic evaluation as an outpatient due to ongoing chronic back pain and gait imbalance.  She ultimately presented to [facility] ED on 7/19/2022.  Brain imaging demonstrated evidence of metastatic disease including a dominant right temporal lobe 3.1 cm enhancing mass with vasogenic edema.  CT scan of her body demonstrated a ANTHONY tumor with subcentimeter pulmonary nodules and a nonspecific adrenal nodule.  Bronchoscopy with biopsy performed on 7/20/2022 demonstrated adenocarcinoma consistent with lung primary; tumor tested PD-L1 negative and was negative for actionable mutations. however, tested TMB-High.  She underwent craniotomy resection of the dominant brain mass on 7/27/2022 with pathology revealing the same findings.  Hospital course was complicated by NSTEMI, Afib with RVR and RML PE.  She was put on medications for seizure prophylaxis.  She was subsequently discharged to acute rehab on 8/10–8/26/2022.  Treated with GK-SRS to brain metastases in Sept 2022. Started 1st line single agent Pembro in Oct 2022; achieved NE.  [de-identified] : – Left mainstem bronchus biopsy 7/20/2022: Poorly differentiated adenocarcinoma of lung origin.\par PD-L1 negative/0%.\par Foundation One:  No actionable mutations (TP53+, among others); TMB-High.  \par –Right brain tumor resection 7/27/2022: Metastatic non-small cell carcinoma, favor adenocarcinoma, consistent with lung origin.\par PD-L1 negative. [de-identified] : Patient started first-line single agent Pembrolizumab in Oct 2022; achieved NM.  She presents prior to planned treatment with C7. \par \par Patient and her family agree that she has been clinically improved on therapy.  She complains of pruritus of her trunk and scalp.  Using moisturizer with some relief\par She has been having some difficulty with word finding and is scheduled for an MRI of head next week. She also is scheduled for follow up with radiation medicine and neurology following imaging. She has been having headaches which are unchanged and well managed with tylenol. SHe denies dizziness or visual changes. No nausea or vomiting. \par \par She continues to c/o back and LE pain which prevents her from walking long distances or standing for extended periods. She has documented degenerative disc disease. PET in December did not reveal any metastatic bony disease. SHe is on gabapentin for this and has follow up with palliative care today. Next PET imaging scheduled for 3/2/23. She has declined PMR referral. \par \par No new complaints. \par \par \par

## 2023-02-17 NOTE — ASSESSMENT
[FreeTextEntry1] : Metastatic NSCLC with adenocarcinoma histology; tumor tested PDL1 Negative and lacks actionable mutations but tested TMB-High.  Treated with GK-SRS for brain metastases in Sept 2022.  Started first line single agent Pembrolizumab in Oct 2022; achieved UT. Recommend:\par – Continue with first-line systemic therapy with single agent Pembrolizumab. For C7 today. \par – Brain Metastases:  F/U with Radiation oncology s/p GK-SRS to brain metastases.  Has Brain MRI ordered for next week.  Rad-Onc follow up as schedule  Neuro-Onc consult with Dr. Hauser appreciated. \par –Continue f/u with Palliative Care \par – PE: Continue Eliquis anticoagulation indefinitely given PE and Afib.\par – Continue follow-up and management with her PCP/cardiologist for her other ongoing medical problems and comorbidities.\par -Back pain: Pt with h/o spinal stenosis.  Takes gabapentin once daily. Continue Tylenol 1000mg tid prn and Gabapentin\par -Iron deficiency:  On PO iron repletion.  Monitor hemoglobin. \par -Dermatitis:  recommended frequent moisturizer and OTC cortisone cream \par -Follow up prior to each cycle.  Restaging scan scheduled for 3/2/23.

## 2023-02-17 NOTE — REASON FOR VISIT
[Follow-Up Visit] : a follow-up [Formal Caregiver] : formal caregiver [Family Member] : family member [FreeTextEntry2] : Lung Cancer

## 2023-02-17 NOTE — PHYSICAL EXAM
[Ambulatory and capable of all self care but unable to carry out any work activities] : Status 2- Ambulatory and capable of all self care but unable to carry out any work activities. Up and about more than 50% of waking hours [Normal] : affect appropriate [de-identified] : Elderly woman NAD [de-identified] : No icterus  [de-identified] : MMM O/P Clear  [de-identified] : No LAD  [de-identified] : Clear  [de-identified] : S1 S2 [de-identified] : LE 1+ pitting edema (chronic)

## 2023-02-23 ENCOUNTER — RESULT REVIEW (OUTPATIENT)
Age: 83
End: 2023-02-23

## 2023-02-23 ENCOUNTER — APPOINTMENT (OUTPATIENT)
Dept: MRI IMAGING | Facility: IMAGING CENTER | Age: 83
End: 2023-02-23
Payer: MEDICARE

## 2023-02-23 ENCOUNTER — OUTPATIENT (OUTPATIENT)
Dept: OUTPATIENT SERVICES | Facility: HOSPITAL | Age: 83
LOS: 1 days | End: 2023-02-23

## 2023-02-23 ENCOUNTER — OUTPATIENT (OUTPATIENT)
Dept: OUTPATIENT SERVICES | Facility: HOSPITAL | Age: 83
LOS: 1 days | End: 2023-02-23
Payer: MEDICARE

## 2023-02-23 ENCOUNTER — APPOINTMENT (OUTPATIENT)
Dept: ULTRASOUND IMAGING | Facility: CLINIC | Age: 83
End: 2023-02-23
Payer: MEDICARE

## 2023-02-23 DIAGNOSIS — C79.31 SECONDARY MALIGNANT NEOPLASM OF BRAIN: ICD-10-CM

## 2023-02-23 DIAGNOSIS — I25.10 ATHEROSCLEROTIC HEART DISEASE OF NATIVE CORONARY ARTERY WITHOUT ANGINA PECTORIS: ICD-10-CM

## 2023-02-23 DIAGNOSIS — I73.9 PERIPHERAL VASCULAR DISEASE, UNSPECIFIED: Chronic | ICD-10-CM

## 2023-02-23 DIAGNOSIS — I25.10 ATHEROSCLEROTIC HEART DISEASE OF NATIVE CORONARY ARTERY WITHOUT ANGINA PECTORIS: Chronic | ICD-10-CM

## 2023-02-23 PROCEDURE — 93880 EXTRACRANIAL BILAT STUDY: CPT | Mod: 26

## 2023-02-23 PROCEDURE — 93880 EXTRACRANIAL BILAT STUDY: CPT

## 2023-02-23 PROCEDURE — 70553 MRI BRAIN STEM W/O & W/DYE: CPT | Mod: MH

## 2023-02-23 PROCEDURE — A9585: CPT

## 2023-02-23 PROCEDURE — 70553 MRI BRAIN STEM W/O & W/DYE: CPT | Mod: 26,MH

## 2023-02-24 NOTE — PHYSICAL EXAM
[General Appearance - Alert] : alert [General Appearance - In No Acute Distress] : in no acute distress [Sclera] : the sclera and conjunctiva were normal [Outer Ear] : the ears and nose were normal in appearance [Hearing Threshold Finger Rub Not [place]] : hearing was normal [Examination Of The Oral Cavity] : the lips and gums were normal [Neck Appearance] : the appearance of the neck was normal [Respiration, Rhythm And Depth] : normal respiratory rhythm and effort [Auscultation Breath Sounds / Voice Sounds] : lungs were clear to auscultation bilaterally [Heart Rate And Rhythm] : heart rate was normal and rhythm regular [Heart Sounds] : normal S1 and S2 [Edema] : there was no peripheral edema [Bowel Sounds] : normal bowel sounds [Abdomen Soft] : soft [Abdomen Tenderness] : non-tender [Skin Color & Pigmentation] : normal skin color and pigmentation [] : no rash [No Focal Deficits] : no focal deficits [Oriented To Time, Place, And Person] : oriented to person, place, and time [Affect] : the affect was normal [FreeTextEntry1] : Tearful at times during the visit

## 2023-02-24 NOTE — HISTORY OF PRESENT ILLNESS
[FreeTextEntry1] : 82yoF with stage IV NSCLC presents for follow-up palliative care visit. \par PMH significant for NSTEMI, DM2, Afib with RVR and RML PE, chronic low back pain, former smoker. \par \par Patient reports that she was suffering with chronic low back pain and progressive gait instability, was evaluated by orthopedist in 2022. She underwent imaging of the spinerain imaging demonstrated evidence of metastatic disease including a dominant right temporal lobe 3.1 cm enhancing mass with vasogenic edema. CT scan of her body demonstrated a ANTHONY tumor with subcentimeter pulmonary nodules and a nonspecific adrenal nodule. Bronchoscopy with biopsy performed on 2022 demonstrated adenocarcinoma consistent with lung primary; tumor tested PD-L1 negative and was negative for actionable mutations. She underwent craniotomy resection of the dominant brain mass on 2022 with pathology revealing the same findings. Hospital course was complicated by NSTEMI, A. fib with RVR and RML PE. She was put on medications for seizure prophylaxis. She was put on anticoagulation and currently remains on prophylactic dosing of anticoagulation with enoxaparin 40 mg subcu daily, pending clearance for therapeutic anticoagulation by neurosurgery. She was subsequently discharged to acute rehab on 8/10–2022. Has initiated home PT services. \par \par Pt met with Dr. Funes yesterday for evaluation for RT. \par Patient presents to establish with palliative care early on. \par She reports fatigue and weakness as her major issues lately. \par \par Interval history (23):\par Patient seen for follow up in treatment room. Therapy continues with Pembroluzimab, overall tolerating it well.\par She continues to report a sharp, intense pain to her right hip and upper leg. Pain is always present but some days the intensity is worse than others. She finds temporary relief with PRN Tylenol 1000mg, one to two times a day. She is unable to stand for a prolonged time and can only walk short distances. This is very frustrating for her. \par She was previously certified for medical cannabis but has not registered due to hesitations with using it. Appetite is OK. Constipation well managed with PRN Miralax. Sleep is interrupted to urinate overnight, typically once a night. Mood fluctuates, she reports feeling like a burden to her family and struggles with her loss of independence over the years. \par \par ROS:\par +mood labile\par +has been losing her hair (this precedes her cancer diagnosis)\par +appetite is low \par Denies n/v, constipation, trouble sleeping\par Remainder of ROS non-contributory\par \par Patient is , lives alone. She has one son who lives nearby (two other sons are ). She has a 24/7 HHA since being home, is requiring assistance with her ADLs. She has a shower chair, grab bars. Has not had any falls since being home. Prior to the surgery she was independent, driving. \par \par PMD/Cardiologist: Dr. Kimani Uribe \par Endocrinologist: Dr. Chaz Richardson\par \par I-STOP Ref#: [id number]

## 2023-02-24 NOTE — ASSESSMENT
[______] : HCP: [unfilled] [FreeTextEntry1] : 82yoF with:\par \par # Stage IV Lung Adenocarcinoma - On Keytruda since 10/11/22; s/p gamma knife surgery. Med Onc follow up. \par \par # Right hip/thigh pain - C/w Gabapentin to 300mg QHS. Discussed how medical cannabis may be beneficial.\par Recommend starting with 1:1 THC:CBD formulation at low dose of THC (~2mg/dose). THC:CBD compound creams may be beneficial. \par \par # Chronic lower back pain - Patient has a multitude of lumbar spine issues as indicated on MRI LS Spine (7/2022). C/w Tylenol 500-1000mg PRN. \par \par # Diminished appetite - Medical cannabis may be beneficial. Recommend small, frequent meals and being less restrictive with her diet. \par \par # Adjustment d/o - Empathetic listening and validation of patient's emotions provided. \par \par # Encounter for palliative care- \par Explained the role of palliative care in enhancing quality of life in the setting of serious illness.\par Health Care Proxy (HCP) form on file.\par \par Follow up in PRN, call with questions or issues.

## 2023-02-27 ENCOUNTER — NON-APPOINTMENT (OUTPATIENT)
Age: 83
End: 2023-02-27

## 2023-02-28 ENCOUNTER — OUTPATIENT (OUTPATIENT)
Dept: OUTPATIENT SERVICES | Facility: HOSPITAL | Age: 83
LOS: 1 days | Discharge: HOME | End: 2023-02-28

## 2023-02-28 ENCOUNTER — TRANSCRIPTION ENCOUNTER (OUTPATIENT)
Age: 83
End: 2023-02-28

## 2023-02-28 DIAGNOSIS — I73.9 PERIPHERAL VASCULAR DISEASE, UNSPECIFIED: Chronic | ICD-10-CM

## 2023-02-28 DIAGNOSIS — I25.10 ATHEROSCLEROTIC HEART DISEASE OF NATIVE CORONARY ARTERY WITHOUT ANGINA PECTORIS: Chronic | ICD-10-CM

## 2023-02-28 DIAGNOSIS — C34.90 MALIGNANT NEOPLASM OF UNSPECIFIED PART OF UNSPECIFIED BRONCHUS OR LUNG: ICD-10-CM

## 2023-03-02 ENCOUNTER — OUTPATIENT (OUTPATIENT)
Dept: OUTPATIENT SERVICES | Facility: HOSPITAL | Age: 83
LOS: 1 days | End: 2023-03-02
Payer: MEDICARE

## 2023-03-02 ENCOUNTER — APPOINTMENT (OUTPATIENT)
Dept: NEUROLOGY | Facility: CLINIC | Age: 83
End: 2023-03-02
Payer: MEDICARE

## 2023-03-02 ENCOUNTER — APPOINTMENT (OUTPATIENT)
Dept: NUCLEAR MEDICINE | Facility: IMAGING CENTER | Age: 83
End: 2023-03-02
Payer: MEDICARE

## 2023-03-02 ENCOUNTER — APPOINTMENT (OUTPATIENT)
Dept: RADIATION ONCOLOGY | Facility: CLINIC | Age: 83
End: 2023-03-02
Payer: MEDICARE

## 2023-03-02 VITALS
TEMPERATURE: 208.58 F | SYSTOLIC BLOOD PRESSURE: 111 MMHG | OXYGEN SATURATION: 98 % | HEART RATE: 74 BPM | RESPIRATION RATE: 16 BRPM | DIASTOLIC BLOOD PRESSURE: 82 MMHG

## 2023-03-02 VITALS
WEIGHT: 157 LBS | SYSTOLIC BLOOD PRESSURE: 125 MMHG | TEMPERATURE: 96.98 F | HEART RATE: 90 BPM | BODY MASS INDEX: 28.89 KG/M2 | DIASTOLIC BLOOD PRESSURE: 77 MMHG | RESPIRATION RATE: 17 BRPM | OXYGEN SATURATION: 96 % | HEIGHT: 62 IN

## 2023-03-02 DIAGNOSIS — I25.10 ATHEROSCLEROTIC HEART DISEASE OF NATIVE CORONARY ARTERY WITHOUT ANGINA PECTORIS: Chronic | ICD-10-CM

## 2023-03-02 DIAGNOSIS — C34.12 MALIGNANT NEOPLASM OF UPPER LOBE, LEFT BRONCHUS OR LUNG: ICD-10-CM

## 2023-03-02 DIAGNOSIS — I73.9 PERIPHERAL VASCULAR DISEASE, UNSPECIFIED: Chronic | ICD-10-CM

## 2023-03-02 PROCEDURE — A9552: CPT

## 2023-03-02 PROCEDURE — 78815 PET IMAGE W/CT SKULL-THIGH: CPT

## 2023-03-02 PROCEDURE — 78815 PET IMAGE W/CT SKULL-THIGH: CPT | Mod: 26,PS,MH

## 2023-03-02 PROCEDURE — 99212 OFFICE O/P EST SF 10 MIN: CPT

## 2023-03-02 PROCEDURE — 99215 OFFICE O/P EST HI 40 MIN: CPT

## 2023-03-02 NOTE — VITALS
[Maximal Pain Intensity: 0/10] : 0/10 [Least Pain Intensity: 0/10] : 0/10 [50: Requires considerable assistance and frequent medical care.] : 50: Requires considerable assistance and frequent medical care. [Date: ____________] : Patient's last distress assessment performed on [unfilled]. [5 - Distress Level] : Distress Level: 5

## 2023-03-02 NOTE — PHYSICAL EXAM
[] : no respiratory distress [Motor Tone] : muscle strength and tone were normal [Normal] : oriented to person, place and time, the affect was normal, the mood was normal and not anxious

## 2023-03-03 NOTE — DATA REVIEWED
[de-identified] : I reviewed pre and post oeprative scans from 7/22 showing resection of right temporal tumor, and SRS planning scan 9/22 showing cystic left frontal parasag lesion and small left frontal lateral lesion, both improved on 12/22 MRI without edema, stable without new lesion in 2/23 MRI. [de-identified] : US bilateral carotid doppler 02/23 reveals bilateral 50-69% internal carotid artery stenosis.

## 2023-03-03 NOTE — HISTORY OF PRESENT ILLNESS
[FreeTextEntry1] : This 82 year old right handed woman is seen in follow up for evaluation and management of episodic aphasia\par \par She has NSCLC.  She presented with headaches, and was found to have a dominant right temporal and two small left frontal brain metastases.  The dominant lesion was resected by Dr. Loomis in 7/22, followed by SRS to cavity and two small lesions in left frontal lobe in 9/22 with good response.  She started pembrolizumab under Dr. Lay's care. EEG at time of diagnosis with RIGHT temporal sharps. \par \par She has had 3 episodes of transient expressive aphasia lasting 5-10 minutes, in 9/22, 10/22 and 1/23. One occurred off eliquis, but the other two occurred on eliquis and keppra. She's been on keppra since surgery. She had no language issues following surgery, but does note that her handwriting (right hand) has become worse.  She's had intermittent headaches. \par \par INTERVAL HISTORY: As per son, had another episode of transient expressive aphasia that lasted 2-3 mins without any other symptoms. Continues to have infrequent mild headaches. US carotid dopplers reveal 50-69% bilateral internal carotid artery stenosis.  Son spoke to vascular surgeon Dr. Mcgrath over the phone who recommended no intervention at this time. To follow up with cardiologist Dr. Uribe next week.  New MRI 2/23 without significant change. \par \par PMH:  Cardiac stents in 1990s.  MI.  PE in 8/22.  Afib listed in chart, but cardiologist denies she had it, \par \par PSH:  CEA on RIGHT.  \par \par SHX:  Office work.  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  no etoh. here with son and aide.

## 2023-03-03 NOTE — DISCUSSION/SUMMARY
[FreeTextEntry1] : Episodic aphasia.  She has vascular risk (albeit on eliquis,however also history of carotid disease and smoking), and interictal spikes on EEG at time of surgery but on RIGHT side.  Left sided lesions seem unlikely to be cause of symptoms given size and edema. MRI head 2/23 stable. Seizures do not seem to be etiology for the aphasia and suspect TIA. I discussed the situation with Dr. Uribe and Dr. Loomis, and the consensus is that adding asa 81 is the most prudent move here.  I think revascularizing carotid on left is not justified in light of questionable etiology and overall prognosis.  Dr. Banda cleared her for additional antplt or anticoag as needed. \par \par APHASIA:\par Continue eliquis. will likely add asa 81.  LDL at goal. \par \par POSSIBLE EPILEPSY:\par maintain keppra 750 BID. \par \par BRAIN METS:\par MRI 2/23 stable. Next MRI in 3 months.\par \par RTC 3 months or sooner if symptoms occur

## 2023-03-07 ENCOUNTER — APPOINTMENT (OUTPATIENT)
Dept: HEMATOLOGY ONCOLOGY | Facility: CLINIC | Age: 83
End: 2023-03-07
Payer: MEDICARE

## 2023-03-07 ENCOUNTER — RESULT REVIEW (OUTPATIENT)
Age: 83
End: 2023-03-07

## 2023-03-07 ENCOUNTER — APPOINTMENT (OUTPATIENT)
Dept: INFUSION THERAPY | Facility: HOSPITAL | Age: 83
End: 2023-03-07

## 2023-03-07 ENCOUNTER — NON-APPOINTMENT (OUTPATIENT)
Age: 83
End: 2023-03-07

## 2023-03-07 VITALS
SYSTOLIC BLOOD PRESSURE: 117 MMHG | BODY MASS INDEX: 28.75 KG/M2 | TEMPERATURE: 97.1 F | WEIGHT: 157.19 LBS | DIASTOLIC BLOOD PRESSURE: 72 MMHG | RESPIRATION RATE: 18 BRPM | HEART RATE: 86 BPM | OXYGEN SATURATION: 98 %

## 2023-03-07 LAB
ALBUMIN SERPL ELPH-MCNC: 3.9 G/DL — SIGNIFICANT CHANGE UP (ref 3.3–5)
ALP SERPL-CCNC: 104 U/L — SIGNIFICANT CHANGE UP (ref 40–120)
ALT FLD-CCNC: 10 U/L — SIGNIFICANT CHANGE UP (ref 10–45)
ANION GAP SERPL CALC-SCNC: 13 MMOL/L — SIGNIFICANT CHANGE UP (ref 5–17)
AST SERPL-CCNC: 11 U/L — SIGNIFICANT CHANGE UP (ref 10–40)
BILIRUB SERPL-MCNC: 0.3 MG/DL — SIGNIFICANT CHANGE UP (ref 0.2–1.2)
BUN SERPL-MCNC: 20 MG/DL — SIGNIFICANT CHANGE UP (ref 7–23)
CALCIUM SERPL-MCNC: 9.6 MG/DL — SIGNIFICANT CHANGE UP (ref 8.4–10.5)
CHLORIDE SERPL-SCNC: 103 MMOL/L — SIGNIFICANT CHANGE UP (ref 96–108)
CO2 SERPL-SCNC: 24 MMOL/L — SIGNIFICANT CHANGE UP (ref 22–31)
CREAT SERPL-MCNC: 0.77 MG/DL — SIGNIFICANT CHANGE UP (ref 0.5–1.3)
EGFR: 77 ML/MIN/1.73M2 — SIGNIFICANT CHANGE UP
GLUCOSE SERPL-MCNC: 150 MG/DL — HIGH (ref 70–99)
HCT VFR BLD CALC: 36.2 % — SIGNIFICANT CHANGE UP (ref 34.5–45)
HGB BLD-MCNC: 11.7 G/DL — SIGNIFICANT CHANGE UP (ref 11.5–15.5)
MCHC RBC-ENTMCNC: 30.1 PG — SIGNIFICANT CHANGE UP (ref 27–34)
MCHC RBC-ENTMCNC: 32.3 GM/DL — SIGNIFICANT CHANGE UP (ref 32–36)
MCV RBC AUTO: 93.1 FL — SIGNIFICANT CHANGE UP (ref 80–100)
PLATELET # BLD AUTO: 296 K/UL — SIGNIFICANT CHANGE UP (ref 150–400)
POTASSIUM SERPL-MCNC: 4.7 MMOL/L — SIGNIFICANT CHANGE UP (ref 3.5–5.3)
POTASSIUM SERPL-SCNC: 4.7 MMOL/L — SIGNIFICANT CHANGE UP (ref 3.5–5.3)
PROT SERPL-MCNC: 6.2 G/DL — SIGNIFICANT CHANGE UP (ref 6–8.3)
RBC # BLD: 3.89 M/UL — SIGNIFICANT CHANGE UP (ref 3.8–5.2)
RBC # FLD: 13 % — SIGNIFICANT CHANGE UP (ref 10.3–14.5)
SODIUM SERPL-SCNC: 140 MMOL/L — SIGNIFICANT CHANGE UP (ref 135–145)
WBC # BLD: 7.19 K/UL — SIGNIFICANT CHANGE UP (ref 3.8–10.5)
WBC # FLD AUTO: 7.19 K/UL — SIGNIFICANT CHANGE UP (ref 3.8–10.5)

## 2023-03-07 PROCEDURE — 99213 OFFICE O/P EST LOW 20 MIN: CPT

## 2023-03-07 PROCEDURE — 99214 OFFICE O/P EST MOD 30 MIN: CPT

## 2023-03-08 DIAGNOSIS — Z51.11 ENCOUNTER FOR ANTINEOPLASTIC CHEMOTHERAPY: ICD-10-CM

## 2023-03-08 DIAGNOSIS — C34.12 MALIGNANT NEOPLASM OF UPPER LOBE, LEFT BRONCHUS OR LUNG: ICD-10-CM

## 2023-03-08 DIAGNOSIS — E05.90 THYROTOXICOSIS, UNSPECIFIED WITHOUT THYROTOXIC CRISIS OR STORM: ICD-10-CM

## 2023-03-08 NOTE — HISTORY OF PRESENT ILLNESS
[de-identified] : Pt here for Keytruda today. Pt c/o itching and pain near her vaginal area going on for a few days. Denies any other symptoms'. On exam no rash appreciated, + nodular pustule present in left groin area. M/l from ingrown hair. Recc warm compresses every few hours . Instructed pt to call clinic if sx worsening. Pt understands and agrees with plan. Primary team made aware.

## 2023-03-10 NOTE — HISTORY OF PRESENT ILLNESS
[Disease: _____________________] : Disease: [unfilled] [AJCC Stage: ____] : AJCC Stage: [unfilled] [de-identified] : 82F former smoker with multiple comorbidities was undergoing orthopedic evaluation as an outpatient due to ongoing chronic back pain and gait imbalance.  She ultimately presented to [facility] ED on 7/19/2022.  Brain imaging demonstrated evidence of metastatic disease including a dominant right temporal lobe 3.1 cm enhancing mass with vasogenic edema.  CT scan of her body demonstrated a ANTHONY tumor with subcentimeter pulmonary nodules and a nonspecific adrenal nodule.  Bronchoscopy with biopsy performed on 7/20/2022 demonstrated adenocarcinoma consistent with lung primary; tumor tested PD-L1 negative and was negative for actionable mutations. however, tested TMB-High.  She underwent craniotomy resection of the dominant brain mass on 7/27/2022 with pathology revealing the same findings.  Hospital course was complicated by NSTEMI, Afib with RVR and RML PE.  She was put on medications for seizure prophylaxis.  She was subsequently discharged to acute rehab on 8/10–8/26/2022.  Treated with GK-SRS to brain metastases in Sept 2022. Started 1st line single agent Pembro in Oct 2022; achieved UT.  [de-identified] : – Left mainstem bronchus biopsy 7/20/2022: Poorly differentiated adenocarcinoma of lung origin.\par PD-L1 negative/0%.\par Foundation One:  No actionable mutations (TP53+, among others); TMB-High.  \par –Right brain tumor resection 7/27/2022: Metastatic non-small cell carcinoma, favor adenocarcinoma, consistent with lung origin.\par PD-L1 negative. [de-identified] : Patient started first-line single agent Pembrolizumab in Oct 2022; achieved PA.  \par She presents prior to planned treatment with C8. \par Patient's main complaint is that of pruritus, particularly on her abdomen, back and groin region.  She has been utilizing moisturizer.  Discussed incorporating OTC hydrocortisone cream first and then considering a nondrowsy allergy medication, if needed.  She does have longstanding difficulty with ambulation; she declines to meet with PMR.\par \par Restaging brain MRI from February 2023 with sustained intracranial response.  A 3-month follow-up brain MRI is planned.\par Restaging PET/CT from this month with sustained response.  There is evidence of abdominal wall subcutaneous FDG activity, which appears to be secondary to the patient's scratching.

## 2023-03-10 NOTE — PHYSICAL EXAM
[Ambulatory and capable of all self care but unable to carry out any work activities] : Status 2- Ambulatory and capable of all self care but unable to carry out any work activities. Up and about more than 50% of waking hours [Normal] : grossly intact [de-identified] : Elderly woman NAD [de-identified] : MMM O/P Clear  [de-identified] : No icterus  [de-identified] : No LAD  [de-identified] : Clear  [de-identified] : S1 S2 [de-identified] : LE 1+ pitting edema (chronic) [de-identified] : No spine/CVA tenderness [de-identified] : Scattered erythema secondary to scratching

## 2023-03-10 NOTE — REVIEW OF SYSTEMS
[Joint Pain] : joint pain [Negative] : Heme/Lymph [FreeTextEntry9] : Low back pain (chronic) [de-identified] : (+) pruritis. Dry skin [de-identified] : difficulty word finding (+) HA

## 2023-03-10 NOTE — RESULTS/DATA
[FreeTextEntry1] : -CT Brain 7/18/22:  Cystic versus a necrotic masses measure approximately 4.8 x 3.2 x 3.2 cm in the right temporal lobe and 1.4 x 1.3 x 1.6 cm in the medial left frontal lobe.  Moderate to large amount of vasogenic edema in the right frontal, parietal and temporal lobes. Trace edema in the left frontal lobe.  Regional mass effect in the right cerebral hemisphere with partial effacement of the ventricular system and 5 mm subfalcine herniation of the right frontal lobe underneath the cerebral falx.  The findings may represent intracranial metastatic disease versus glioblastoma. Contrast-enhanced brain MRI is recommended for further evaluation.\par \par -CT C/A/P 7/18/22:  Left upper lobe lung mass with partial atelectasis of the left upper lobe, concerning for primary lung neoplasm. Few scattered sub-4 mm pulmonary nodules, indeterminate.  Indeterminant left adrenal nodule for which contrast enhanced MRI is recommended for further evaluation.\par \par -MRI Brain 7/20/22:  Right temporal lobe peripherally enhancing, centrally necrotic 3.1 x 2.6 cm mass demonstrating mild diffusion restriction. There is surrounding vasogenic edema. 8 mm leftward midline shift. Right uncal herniation. Additional left parafalcine 1.3 x 0.9 cm rim-enhancing cystic lesion with mild surrounding vasogenic edema. Tiny 0.3 x 0.3 cm rim-enhancing lesion within the left frontal cortex. Findings are concerning for metastatic disease in the context of patient's known primary lung cancer. The left lateral ventricle is mildly dilated.\par \par -LE Dopplers 7/24/22:  No evidence of deep venous thrombosis in either lower extremity.\par -LE Dopplers 7/28/22:  No evidence of deep venous thrombosis in either lower extremity.\par \par -CT Chest Angio 8/3/22:  Pulmonary embolism in the lateral segmental branch of the right middle lobe. No evidence of right heart strain.  Interval decrease in left upper lobe mass.  Unchanged obstruction of the posterior bronchus, and stable fibroglandular lymph node.\par \par -Abdominal U/S 8/4/22:   Mildly distended gallbladder. No evidence of acute cholecystitis.  Possible mild hydronephrosis.\par \par -LE Dopplers 8/7/22:  No evidence of deep venous thrombosis in either lower extremity.\par \par -LE Dopplers 9/1/22:  No evidence of deep venous thrombosis in either lower extremity.\par \par -PET/CT 9/9/22:  Abnormal FDG-PET/CT scan.\par 1. FDG-avid left upper lobe lung mass abutting the mediastinum at level of AP window, with associated atelectasis along the mediastinum, unchanged as compared to CT dated 8/3/2022, corresponds to patient's known adenocarcinoma.\par 2. Right temporal craniotomy with photopenic low attenuation within right temporal lobe, compatible with vasogenic edema, gliosis, and/or postsurgical changes, better evaluated on contrast-enhanced CT or MRI.\par 3. Findings suggestive of left vocal cord paralysis, likely secondary to left upper lobe lung mass. Please correlate clinically and with direct visualization, as indicated.\par 4. Nonspecific hypermetabolism in small bowel and colon likely is related to metformin.\par 5. No scan evidence of metastatic disease.\par \par -PET/CT 12/8/22: \par 1. FDG-avid left upper lobe lung nodule is decreased in size and metabolism, compatible with a partial response to interval therapy. Interval decrease in adjacent left upper lobe paramediastinal opacity, likely atelectasis, which is unchanged in metabolism.\par 2. Right temporal craniotomy with photopenic low attenuation within right temporal lobe, compatible with vasogenic edema, gliosis, and/or postsurgical changes, better evaluated on contrast-enhanced CT or MRI, not significantly changed.\par 3. 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 Nonspecific hypermetabolism in small bowel and colon, unchanged, likely related to metformin.\par 5. No scan evidence of metastatic disease.\par \par -MRI 12/8/22:  Interval decrease in size and peripheral enhancement of the medial left frontal lesion. Smaller ring-enhancing lesion more laterally along the left frontal cortex no longer visualized.  No new lesions seen.  Postsurgical cavity right temporal lobe with mild adjacent enhancement. Continued follow-up recommended.\par \par Images Reviewed/Interpreted:\par \par –MRI Brain 2/23/23:  Stable medial left frontal lobe nonenhancing cystic lesion. Stable linear enhancement medial to the right temporal lobe surgical cavity. No new areas abnormal enhancement. Continued follow-up is recommended\par \par -PET/CT 3/2/23:  Compared to FDG PET/CT dated 12/8/2022:\par 1. FDG-avid left upper lobe lung nodule is not significantly changed in size or metabolism, compatible with stable disease. Interval decrease in size and metabolism of adjacent left upper lobe opacity abutting the mediastinum.\par 2. Nonspecific FDG-avid skin thickening, bilateral mid/lower anterior abdominal wall with new associated subcutaneous stranding, and increased in metabolism, likely secondary to inflammation. Please correlate clinically.\par 3. Right temporal craniotomy with photopenic low attenuation within right temporal lobe, compatible with vasogenic edema, gliosis, and/or postsurgical changes, better evaluated on contrast-enhanced CT or MRI, not significantly changed.\par 4. Nonspecific pancolonic hypermetabolism, not significantly changed, likely is related to metformin.\par 5. No scan evidence of metastatic disease.\par \par

## 2023-03-10 NOTE — ASSESSMENT
[FreeTextEntry1] : Metastatic NSCLC with adenocarcinoma histology; tumor tested PDL1 Negative and lacks actionable mutations but tested TMB-High.  Treated with GK-SRS for brain metastases in Sept 2022.  Started first line single agent Pembrolizumab in Oct 2022; achieved NV. \par Restaging Brain MRI Feb 2023 with sustained intracranial response.  \par Restaging PET/CT March 2023 with sustained response.  \par Recommend:\par – Continue with first-line systemic therapy with single agent Pembrolizumab for as long as it benefits the patient.  For C8 today. \par -Repeat labs today.  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  Free T4 normal.  Monitor. \par – Brain Metastases:  F/U with Radiation oncology s/p GK-SRS to brain metastases.  Due for repeat Brain MRI in May 2023.  Follows with Neuro-Onc as well. \par –Continue f/u with Palliative Care \par – PE: Continue Eliquis anticoagulation indefinitely given PE and Afib.\par – Continue follow-up and management with her PCP/cardiologist for her other ongoing medical problems and comorbidities.\par -Back pain: Pt with h/o spinal stenosis.  Takes gabapentin once daily. Continue Tylenol 1000mg tid prn and Gabapentin\par -Iron deficiency:  On PO iron repletion.  Monitor hemoglobin. \par -Dermatitis:  recommended frequent moisturizer and OTC cortisone cream.  Can utilize OTC non-drowsy allergy medication if other measures are insufficient \par -Follow up prior to each cycle.  Would plan on obtaining her next restaging scan in the latter half of June if she remains clinically stable.    \par Checkout form provided to patient with instructions for scheduling appointments.  She schedules some cycles on days when I do not have office hours; discussed that treatment should be within 1 day of the q3 week schedule to avoid any potential reimbursement issues for her treatment.

## 2023-03-30 ENCOUNTER — RESULT REVIEW (OUTPATIENT)
Age: 83
End: 2023-03-30

## 2023-03-30 ENCOUNTER — APPOINTMENT (OUTPATIENT)
Dept: INFUSION THERAPY | Facility: HOSPITAL | Age: 83
End: 2023-03-30

## 2023-03-30 ENCOUNTER — APPOINTMENT (OUTPATIENT)
Dept: HEMATOLOGY ONCOLOGY | Facility: CLINIC | Age: 83
End: 2023-03-30
Payer: MEDICARE

## 2023-03-30 LAB
ALBUMIN SERPL ELPH-MCNC: 4.2 G/DL — SIGNIFICANT CHANGE UP (ref 3.3–5)
ALP SERPL-CCNC: 97 U/L — SIGNIFICANT CHANGE UP (ref 40–120)
ALT FLD-CCNC: 10 U/L — SIGNIFICANT CHANGE UP (ref 10–45)
ANION GAP SERPL CALC-SCNC: 14 MMOL/L — SIGNIFICANT CHANGE UP (ref 5–17)
AST SERPL-CCNC: 10 U/L — SIGNIFICANT CHANGE UP (ref 10–40)
BASOPHILS # BLD AUTO: 0.03 K/UL — SIGNIFICANT CHANGE UP (ref 0–0.2)
BASOPHILS NFR BLD AUTO: 0.5 % — SIGNIFICANT CHANGE UP (ref 0–2)
BILIRUB SERPL-MCNC: 0.4 MG/DL — SIGNIFICANT CHANGE UP (ref 0.2–1.2)
BUN SERPL-MCNC: 24 MG/DL — HIGH (ref 7–23)
CALCIUM SERPL-MCNC: 9.8 MG/DL — SIGNIFICANT CHANGE UP (ref 8.4–10.5)
CHLORIDE SERPL-SCNC: 104 MMOL/L — SIGNIFICANT CHANGE UP (ref 96–108)
CO2 SERPL-SCNC: 25 MMOL/L — SIGNIFICANT CHANGE UP (ref 22–31)
CREAT SERPL-MCNC: 0.83 MG/DL — SIGNIFICANT CHANGE UP (ref 0.5–1.3)
EGFR: 70 ML/MIN/1.73M2 — SIGNIFICANT CHANGE UP
EOSINOPHIL # BLD AUTO: 0.24 K/UL — SIGNIFICANT CHANGE UP (ref 0–0.5)
EOSINOPHIL NFR BLD AUTO: 3.9 % — SIGNIFICANT CHANGE UP (ref 0–6)
GLUCOSE SERPL-MCNC: 157 MG/DL — HIGH (ref 70–99)
HCT VFR BLD CALC: 35.7 % — SIGNIFICANT CHANGE UP (ref 34.5–45)
HGB BLD-MCNC: 11.8 G/DL — SIGNIFICANT CHANGE UP (ref 11.5–15.5)
IMM GRANULOCYTES NFR BLD AUTO: 0.5 % — SIGNIFICANT CHANGE UP (ref 0–0.9)
LYMPHOCYTES # BLD AUTO: 2.12 K/UL — SIGNIFICANT CHANGE UP (ref 1–3.3)
LYMPHOCYTES # BLD AUTO: 34.4 % — SIGNIFICANT CHANGE UP (ref 13–44)
MCHC RBC-ENTMCNC: 30.6 PG — SIGNIFICANT CHANGE UP (ref 27–34)
MCHC RBC-ENTMCNC: 33.1 G/DL — SIGNIFICANT CHANGE UP (ref 32–36)
MCV RBC AUTO: 92.7 FL — SIGNIFICANT CHANGE UP (ref 80–100)
MONOCYTES # BLD AUTO: 0.47 K/UL — SIGNIFICANT CHANGE UP (ref 0–0.9)
MONOCYTES NFR BLD AUTO: 7.6 % — SIGNIFICANT CHANGE UP (ref 2–14)
NEUTROPHILS # BLD AUTO: 3.27 K/UL — SIGNIFICANT CHANGE UP (ref 1.8–7.4)
NEUTROPHILS NFR BLD AUTO: 53.1 % — SIGNIFICANT CHANGE UP (ref 43–77)
NRBC # BLD: 0 /100 WBCS — SIGNIFICANT CHANGE UP (ref 0–0)
PLATELET # BLD AUTO: 221 K/UL — SIGNIFICANT CHANGE UP (ref 150–400)
POTASSIUM SERPL-MCNC: 4.7 MMOL/L — SIGNIFICANT CHANGE UP (ref 3.5–5.3)
POTASSIUM SERPL-SCNC: 4.7 MMOL/L — SIGNIFICANT CHANGE UP (ref 3.5–5.3)
PROT SERPL-MCNC: 6 G/DL — SIGNIFICANT CHANGE UP (ref 6–8.3)
RBC # BLD: 3.85 M/UL — SIGNIFICANT CHANGE UP (ref 3.8–5.2)
RBC # FLD: 13.1 % — SIGNIFICANT CHANGE UP (ref 10.3–14.5)
SODIUM SERPL-SCNC: 143 MMOL/L — SIGNIFICANT CHANGE UP (ref 135–145)
T4 FREE SERPL-MCNC: 1 NG/DL — SIGNIFICANT CHANGE UP (ref 0.9–1.8)
TSH SERPL-MCNC: 0.26 UIU/ML — LOW (ref 0.27–4.2)
WBC # BLD: 6.16 K/UL — SIGNIFICANT CHANGE UP (ref 3.8–10.5)
WBC # FLD AUTO: 6.16 K/UL — SIGNIFICANT CHANGE UP (ref 3.8–10.5)

## 2023-03-30 PROCEDURE — 99214 OFFICE O/P EST MOD 30 MIN: CPT

## 2023-03-31 NOTE — PHYSICAL EXAM
[Ambulatory and capable of all self care but unable to carry out any work activities] : Status 2- Ambulatory and capable of all self care but unable to carry out any work activities. Up and about more than 50% of waking hours [Normal] : affect appropriate [de-identified] : Elderly woman NAD [de-identified] : No icterus  [de-identified] : MMM O/P Clear  [de-identified] : Clear  [de-identified] : No LAD  [de-identified] : S1 S2 [de-identified] : LE 1+ pitting edema (chronic) [de-identified] : No spine/CVA tenderness [de-identified] : Scattered erythema secondary to scratching

## 2023-03-31 NOTE — REVIEW OF SYSTEMS
[Joint Pain] : joint pain [Negative] : Heme/Lymph [FreeTextEntry9] : Low back pain (chronic) [de-identified] : (+) pruritis. Dry skin [de-identified] : difficulty word finding (+) HA

## 2023-03-31 NOTE — HISTORY OF PRESENT ILLNESS
[Disease: _____________________] : Disease: [unfilled] [AJCC Stage: ____] : AJCC Stage: [unfilled] [de-identified] : 82F former smoker with multiple comorbidities was undergoing orthopedic evaluation as an outpatient due to ongoing chronic back pain and gait imbalance.  She ultimately presented to [facility] ED on 7/19/2022.  Brain imaging demonstrated evidence of metastatic disease including a dominant right temporal lobe 3.1 cm enhancing mass with vasogenic edema.  CT scan of her body demonstrated a ANTHONY tumor with subcentimeter pulmonary nodules and a nonspecific adrenal nodule.  Bronchoscopy with biopsy performed on 7/20/2022 demonstrated adenocarcinoma consistent with lung primary; tumor tested PD-L1 negative and was negative for actionable mutations. however, tested TMB-High.  She underwent craniotomy resection of the dominant brain mass on 7/27/2022 with pathology revealing the same findings.  Hospital course was complicated by NSTEMI, Afib with RVR and RML PE.  She was put on medications for seizure prophylaxis.  She was subsequently discharged to acute rehab on 8/10–8/26/2022.  Treated with GK-SRS to brain metastases in Sept 2022. Started 1st line single agent Pembro in Oct 2022; achieved [place].  [de-identified] : – Left mainstem bronchus biopsy 7/20/2022: Poorly differentiated adenocarcinoma of lung origin.\par PD-L1 negative/0%.\par Foundation One:  No actionable mutations (TP53+, among others); TMB-High.  \par –Right brain tumor resection 7/27/2022: Metastatic non-small cell carcinoma, favor adenocarcinoma, consistent with lung origin.\par PD-L1 negative. [de-identified] : Patient started first-line single agent Pembrolizumab in Oct 2022; achieved [place].  \par She is seen in the treatment room prior to treatment with C9. \par Feeling fairly well. Her major complaint is longstanding difficulty with ambulation; today she is in agreement to meet with PMR. \par Denies N/V/C/D. \par

## 2023-03-31 NOTE — ASSESSMENT
[FreeTextEntry1] : Metastatic NSCLC with adenocarcinoma histology; tumor tested PDL1 Negative and lacks actionable mutations but tested TMB-High.  Treated with GK-SRS for brain metastases in Sept 2022.  Started first line single agent Pembrolizumab in Oct 2022; achieved [place]. \par Restaging Brain MRI Feb 2023 with sustained intracranial response.  \par Restaging PET/CT March 2023 with sustained response.  \par Recommend:\par – Continue with first-line systemic therapy with single agent Pembrolizumab for as long as it benefits the patient.  For C9 today. \par -Repeat labs today.  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  Free T4 normal.  Monitor. \par – Brain Metastases:  F/U with Radiation oncology s/p GK-SRS to brain metastases.  Due for repeat Brain MRI in May 2023.  Follows with Neuro-Onc as well. \par –Continue f/u with Palliative Care \par – PE: Continue Eliquis anticoagulation indefinitely given PE and Afib.\par – Continue follow-up and management with her PCP/cardiologist for her other ongoing medical problems and comorbidities.\par -Back pain: Pt with h/o spinal stenosis.  Takes gabapentin once daily. Continue Tylenol 1000mg tid prn and Gabapentin 300mg qhs. She is in agreement to meet with PM&R for evaluation\par -Iron deficiency:  On PO iron repletion.  Monitor hemoglobin. \par -Dermatitis:  recommended frequent moisturizer and OTC cortisone cream.  Can utilize OTC non-drowsy allergy medication if other measures are insufficient \par -Follow up prior to each cycle.  Would plan on obtaining her next restaging scan in the latter half of June if she remains clinically stable.    \par Checkout form provided to patient with instructions for scheduling appointments.  She schedules some cycles on days when I do not have office hours; discussed that treatment should be within 1 day of the q3 week schedule to avoid any potential reimbursement issues for her treatment.

## 2023-04-04 NOTE — HISTORY OF PRESENT ILLNESS
[FreeTextEntry1] : Ms. Ros Phan presented initially for evaluation of brain mets on 9/6/2022 (notably a right temporal lobe peripherally enhancing, centrally necrotic 3.1 x 2.6 cm mass demonstrating mild diffusion restriction with surrounding vasogenic edema as well as a left parafalcine lesion) s/p craniotomy and resection of R temporal lesion on 7/27/2022, and now being considered for RT. Patient has a lung primary, NSCLC (adenocarcinoma on histology, PD-L1 negative and negative for targetable mutations).\par She completed radiation therapy for a total of 2700 cgy to a right temporal cavity and a left frontal lesion over 3 fractions 9/19-9/22/2022, and to a left frontal lesion 1800 cgy over 1 fraction on 9/22/2022\par \par ONCOLOGIC HISTORY\par 82F former smoker, PMH of HTN, HLD, CAD, PAD, MI, DM2, peripheral neuropathy who was undergoing outpatient orthopedic evaluation due to ongoing chronic back pain (20+ years) and gait imbalance. MRI outpatient was reported to be abnormal and told to go to ED for further imaging. She ultimately presented to [facility] ED on 7/19/2022. Brain imaging demonstrated evidence of metastatic disease including a dominant right temporal lobe 3.1 cm enhancing mass with vasogenic edema. There was also a Left parafalcine lesion noted. CT scan of her body demonstrated a ANTHONY tumor with subcentimeter pulmonary nodules and a nonspecific adrenal nodule. Bronchoscopy with biopsy performed on 7/20/2022 demonstrated adenocarcinoma consistent with lung primary; tumor tested PD-L1 negative and was negative for actionable mutations. She underwent craniotomy resection of the dominant brain mass on 7/27/2022 with pathology revealing the same findings. Hospital course was complicated by NSTEMI, A. fib with RVR and RML PE. She was put on medications for seizure prophylaxis. She was put on anticoagulation and currently remains on prophylactic dosing of anticoagulation with enoxaparin 40 mg subcu daily, pending clearance for therapeutic anticoagulation by neurosurgery. She was subsequently discharged to acute rehab on 8/10–8/26/2022.\par \par 9/5/2022: Patient presents for initial consult today. She is off of steroids at this time. Continues on keppra and deptakote at this time. Recently follow up with Dr. Ramon and plans to start patient on carboplatin/pemetrexed/pembro. Patient has a PET scan scheduled for Friday. She notes blurry vision in the L eye, short-term memory issues, gait instability following surgery. \par \par 12/15/2022- Ms. Phan presents today for post treatment evaluation.\par Brain MRI 12/8/2022 showed Interval decrease in size and peripheral enhancement of the medial left frontal lesion. Smaller ring-enhancing lesion more laterally along the left frontal cortex no longer visualized.\par No new lesions seen\par Postsurgical cavity right temporal lobe with mild adjacent enhancement. Continued follow-up recommended.\par \par She continues to follow with Dr. Ramon. continues on first line therapy with single agent pembrolizumab. \par \par PET scan 12/8/2022 showed 1. FDG-avid left upper lobe lung nodule is decreased in size and metabolism, compatible with a partial response to interval therapy. Interval decrease in adjacent left upper lobe paramediastinal opacity, likely atelectasis, which is unchanged in metabolism.\par 2. Right temporal craniotomy with photopenic low attenuation within right temporal lobe, compatible with vasogenic edema, gliosis, and/or postsurgical changes, better evaluated on contrast-enhanced CT or MRI, not significantly changed.\par 3. 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 Nonspecific hypermetabolism in small bowel and colon, unchanged, likely related to metformin.\par 5. No scan evidence of metastatic disease.\par \par Today she feels well overall. no headaches, no nausea. notes some persistent dysphagia since post surgery. still having trouble ambulating since surgery due to imbalance, but not worse at all. Uses a wheelchair only when out for appointments. memory is poor, remaining since surgery, not worse. \par \par 3/2/2023- Ms. Phan presents today for follow up. Has continued to follow with Dr. Ramon. continues on pembrolizumab. has followed with Dr. Loomis and Dr. Hauser regarding intermittent aphasia episodes. keppra increased to 750 mg BID. Noted to be high vascular risk. \par \par Brain MRI 2/23/2023 showed Stable medial left frontal lobe nonenhancing cystic lesion. Stable linear enhancement medial to the right temporal lobe surgical cavity. No new areas abnormal enhancement. Continued follow-up is recommended\par \par Saw Dr. Cox regarding possible vocal cord procedures.  PET scan to be done today 3/2. \par \par Carotid ultrasound 2/23/2023 showed Bilateral 50-69% internal carotid artery stenosis.Right external carotid artery stenosis.\par Bilateral nonspecific thyroid nodules. Follow-up dedicated thyroid ultrasound is recommended.\par \par Today she feels overall well. notes some trouble with balance persists. mild head pain every once in a while. did have an episode of speech trouble yesterday. no nausea, no vomiting, no focal weakness. using a wheelchair today, but does not use it much at home. notes eyes and nose are draining since surgery.

## 2023-04-04 NOTE — REVIEW OF SYSTEMS
[Dizziness] : dizziness [Difficulty Walking] : difficulty walking [Negative] : Psychiatric [Fever] : no fever [Chills] : no chills [Night Sweats] : no night sweats [Eye Pain] : no eye pain [Red Eyes] : eyes not red [Dry Eyes] : no dryness of the eyes [Dysphagia] : no dysphagia [Loss of Hearing] : no loss of hearing [Chest Pain] : no chest pain [Palpitations] : no palpitations [Shortness Of Breath] : no shortness of breath [Wheezing] : no wheezing [FreeTextEntry3] : notes eyes are watery since surgery [FreeTextEntry4] : still bothered by voice [FreeTextEntry9] : denies focal weakness. ambulation long distances remains hard [de-identified] : unsteady since surgery, stable. uses wheelchair for appointments. intermittent mild head pain. forgetfulness, notes stable since prior to RT. Unsteady gait since surgery, stable.

## 2023-04-20 ENCOUNTER — RESULT REVIEW (OUTPATIENT)
Age: 83
End: 2023-04-20

## 2023-04-20 ENCOUNTER — APPOINTMENT (OUTPATIENT)
Dept: INFUSION THERAPY | Facility: HOSPITAL | Age: 83
End: 2023-04-20

## 2023-04-20 LAB
ALBUMIN SERPL ELPH-MCNC: 4.2 G/DL — SIGNIFICANT CHANGE UP (ref 3.3–5)
ALP SERPL-CCNC: 95 U/L — SIGNIFICANT CHANGE UP (ref 40–120)
ALT FLD-CCNC: 10 U/L — SIGNIFICANT CHANGE UP (ref 10–45)
ANION GAP SERPL CALC-SCNC: 14 MMOL/L — SIGNIFICANT CHANGE UP (ref 5–17)
AST SERPL-CCNC: 18 U/L — SIGNIFICANT CHANGE UP (ref 10–40)
BASOPHILS # BLD AUTO: 0.04 K/UL — SIGNIFICANT CHANGE UP (ref 0–0.2)
BASOPHILS NFR BLD AUTO: 0.6 % — SIGNIFICANT CHANGE UP (ref 0–2)
BILIRUB SERPL-MCNC: 0.4 MG/DL — SIGNIFICANT CHANGE UP (ref 0.2–1.2)
BUN SERPL-MCNC: 21 MG/DL — SIGNIFICANT CHANGE UP (ref 7–23)
CALCIUM SERPL-MCNC: 9.6 MG/DL — SIGNIFICANT CHANGE UP (ref 8.4–10.5)
CHLORIDE SERPL-SCNC: 105 MMOL/L — SIGNIFICANT CHANGE UP (ref 96–108)
CO2 SERPL-SCNC: 23 MMOL/L — SIGNIFICANT CHANGE UP (ref 22–31)
CREAT SERPL-MCNC: 1.06 MG/DL — SIGNIFICANT CHANGE UP (ref 0.5–1.3)
EGFR: 52 ML/MIN/1.73M2 — LOW
EOSINOPHIL # BLD AUTO: 0.27 K/UL — SIGNIFICANT CHANGE UP (ref 0–0.5)
EOSINOPHIL NFR BLD AUTO: 3.7 % — SIGNIFICANT CHANGE UP (ref 0–6)
GLUCOSE SERPL-MCNC: 179 MG/DL — HIGH (ref 70–99)
HCT VFR BLD CALC: 37.2 % — SIGNIFICANT CHANGE UP (ref 34.5–45)
HGB BLD-MCNC: 12.4 G/DL — SIGNIFICANT CHANGE UP (ref 11.5–15.5)
IMM GRANULOCYTES NFR BLD AUTO: 0.4 % — SIGNIFICANT CHANGE UP (ref 0–0.9)
LYMPHOCYTES # BLD AUTO: 2.27 K/UL — SIGNIFICANT CHANGE UP (ref 1–3.3)
LYMPHOCYTES # BLD AUTO: 31.2 % — SIGNIFICANT CHANGE UP (ref 13–44)
MCHC RBC-ENTMCNC: 31.2 PG — SIGNIFICANT CHANGE UP (ref 27–34)
MCHC RBC-ENTMCNC: 33.3 G/DL — SIGNIFICANT CHANGE UP (ref 32–36)
MCV RBC AUTO: 93.5 FL — SIGNIFICANT CHANGE UP (ref 80–100)
MONOCYTES # BLD AUTO: 0.44 K/UL — SIGNIFICANT CHANGE UP (ref 0–0.9)
MONOCYTES NFR BLD AUTO: 6.1 % — SIGNIFICANT CHANGE UP (ref 2–14)
NEUTROPHILS # BLD AUTO: 4.22 K/UL — SIGNIFICANT CHANGE UP (ref 1.8–7.4)
NEUTROPHILS NFR BLD AUTO: 58 % — SIGNIFICANT CHANGE UP (ref 43–77)
NRBC # BLD: 0 /100 WBCS — SIGNIFICANT CHANGE UP (ref 0–0)
PLATELET # BLD AUTO: 252 K/UL — SIGNIFICANT CHANGE UP (ref 150–400)
POTASSIUM SERPL-MCNC: 5.7 MMOL/L — HIGH (ref 3.5–5.3)
POTASSIUM SERPL-SCNC: 5.7 MMOL/L — HIGH (ref 3.5–5.3)
PROT SERPL-MCNC: 6.3 G/DL — SIGNIFICANT CHANGE UP (ref 6–8.3)
RBC # BLD: 3.98 M/UL — SIGNIFICANT CHANGE UP (ref 3.8–5.2)
RBC # FLD: 12.7 % — SIGNIFICANT CHANGE UP (ref 10.3–14.5)
SODIUM SERPL-SCNC: 141 MMOL/L — SIGNIFICANT CHANGE UP (ref 135–145)
WBC # BLD: 7.27 K/UL — SIGNIFICANT CHANGE UP (ref 3.8–10.5)
WBC # FLD AUTO: 7.27 K/UL — SIGNIFICANT CHANGE UP (ref 3.8–10.5)

## 2023-05-01 ENCOUNTER — OUTPATIENT (OUTPATIENT)
Dept: OUTPATIENT SERVICES | Facility: HOSPITAL | Age: 83
LOS: 1 days | Discharge: HOME | End: 2023-05-01

## 2023-05-01 DIAGNOSIS — I73.9 PERIPHERAL VASCULAR DISEASE, UNSPECIFIED: Chronic | ICD-10-CM

## 2023-05-01 DIAGNOSIS — I25.10 ATHEROSCLEROTIC HEART DISEASE OF NATIVE CORONARY ARTERY WITHOUT ANGINA PECTORIS: Chronic | ICD-10-CM

## 2023-05-01 DIAGNOSIS — C34.90 MALIGNANT NEOPLASM OF UNSPECIFIED PART OF UNSPECIFIED BRONCHUS OR LUNG: ICD-10-CM

## 2023-05-09 ENCOUNTER — RESULT REVIEW (OUTPATIENT)
Age: 83
End: 2023-05-09

## 2023-05-09 ENCOUNTER — APPOINTMENT (OUTPATIENT)
Dept: INFUSION THERAPY | Facility: HOSPITAL | Age: 83
End: 2023-05-09

## 2023-05-09 ENCOUNTER — APPOINTMENT (OUTPATIENT)
Dept: HEMATOLOGY ONCOLOGY | Facility: CLINIC | Age: 83
End: 2023-05-09
Payer: MEDICARE

## 2023-05-09 VITALS
HEART RATE: 76 BPM | RESPIRATION RATE: 16 BRPM | WEIGHT: 160.27 LBS | SYSTOLIC BLOOD PRESSURE: 138 MMHG | DIASTOLIC BLOOD PRESSURE: 79 MMHG | BODY MASS INDEX: 29.31 KG/M2 | TEMPERATURE: 97.3 F

## 2023-05-09 LAB
BASOPHILS # BLD AUTO: 0.03 K/UL — SIGNIFICANT CHANGE UP (ref 0–0.2)
BASOPHILS NFR BLD AUTO: 0.4 % — SIGNIFICANT CHANGE UP (ref 0–2)
EOSINOPHIL # BLD AUTO: 0.33 K/UL — SIGNIFICANT CHANGE UP (ref 0–0.5)
EOSINOPHIL NFR BLD AUTO: 4.5 % — SIGNIFICANT CHANGE UP (ref 0–6)
HCT VFR BLD CALC: 39 % — SIGNIFICANT CHANGE UP (ref 34.5–45)
HGB BLD-MCNC: 12.9 G/DL — SIGNIFICANT CHANGE UP (ref 11.5–15.5)
IMM GRANULOCYTES NFR BLD AUTO: 0.4 % — SIGNIFICANT CHANGE UP (ref 0–0.9)
LYMPHOCYTES # BLD AUTO: 2.24 K/UL — SIGNIFICANT CHANGE UP (ref 1–3.3)
LYMPHOCYTES # BLD AUTO: 30.2 % — SIGNIFICANT CHANGE UP (ref 13–44)
MCHC RBC-ENTMCNC: 31.1 PG — SIGNIFICANT CHANGE UP (ref 27–34)
MCHC RBC-ENTMCNC: 33.1 G/DL — SIGNIFICANT CHANGE UP (ref 32–36)
MCV RBC AUTO: 94 FL — SIGNIFICANT CHANGE UP (ref 80–100)
MONOCYTES # BLD AUTO: 0.46 K/UL — SIGNIFICANT CHANGE UP (ref 0–0.9)
MONOCYTES NFR BLD AUTO: 6.2 % — SIGNIFICANT CHANGE UP (ref 2–14)
NEUTROPHILS # BLD AUTO: 4.32 K/UL — SIGNIFICANT CHANGE UP (ref 1.8–7.4)
NEUTROPHILS NFR BLD AUTO: 58.3 % — SIGNIFICANT CHANGE UP (ref 43–77)
NRBC # BLD: 0 /100 WBCS — SIGNIFICANT CHANGE UP (ref 0–0)
PLATELET # BLD AUTO: 250 K/UL — SIGNIFICANT CHANGE UP (ref 150–400)
RBC # BLD: 4.15 M/UL — SIGNIFICANT CHANGE UP (ref 3.8–5.2)
RBC # FLD: 12.4 % — SIGNIFICANT CHANGE UP (ref 10.3–14.5)
WBC # BLD: 7.41 K/UL — SIGNIFICANT CHANGE UP (ref 3.8–10.5)
WBC # FLD AUTO: 7.41 K/UL — SIGNIFICANT CHANGE UP (ref 3.8–10.5)

## 2023-05-09 PROCEDURE — 99214 OFFICE O/P EST MOD 30 MIN: CPT

## 2023-05-09 RX ORDER — PREDNISOLONE ACETATE 10 MG/ML
1 SUSPENSION/ DROPS OPHTHALMIC
Refills: 0 | Status: ACTIVE | COMMUNITY

## 2023-05-10 DIAGNOSIS — Z51.11 ENCOUNTER FOR ANTINEOPLASTIC CHEMOTHERAPY: ICD-10-CM

## 2023-05-10 DIAGNOSIS — C34.12 MALIGNANT NEOPLASM OF UPPER LOBE, LEFT BRONCHUS OR LUNG: ICD-10-CM

## 2023-05-10 DIAGNOSIS — E05.90 THYROTOXICOSIS, UNSPECIFIED WITHOUT THYROTOXIC CRISIS OR STORM: ICD-10-CM

## 2023-05-10 LAB
ALBUMIN SERPL ELPH-MCNC: 4.5 G/DL — SIGNIFICANT CHANGE UP (ref 3.3–5)
ALP SERPL-CCNC: 94 U/L — SIGNIFICANT CHANGE UP (ref 40–120)
ALT FLD-CCNC: 12 U/L — SIGNIFICANT CHANGE UP (ref 10–45)
ANION GAP SERPL CALC-SCNC: 19 MMOL/L — HIGH (ref 5–17)
AST SERPL-CCNC: 17 U/L — SIGNIFICANT CHANGE UP (ref 10–40)
BILIRUB SERPL-MCNC: 0.4 MG/DL — SIGNIFICANT CHANGE UP (ref 0.2–1.2)
BUN SERPL-MCNC: 18 MG/DL — SIGNIFICANT CHANGE UP (ref 7–23)
CALCIUM SERPL-MCNC: 9.9 MG/DL — SIGNIFICANT CHANGE UP (ref 8.4–10.5)
CHLORIDE SERPL-SCNC: 104 MMOL/L — SIGNIFICANT CHANGE UP (ref 96–108)
CO2 SERPL-SCNC: 21 MMOL/L — LOW (ref 22–31)
CREAT SERPL-MCNC: 0.74 MG/DL — SIGNIFICANT CHANGE UP (ref 0.5–1.3)
EGFR: 80 ML/MIN/1.73M2 — SIGNIFICANT CHANGE UP
GLUCOSE SERPL-MCNC: 144 MG/DL — HIGH (ref 70–99)
POTASSIUM SERPL-MCNC: 5.1 MMOL/L — SIGNIFICANT CHANGE UP (ref 3.5–5.3)
POTASSIUM SERPL-SCNC: 5.1 MMOL/L — SIGNIFICANT CHANGE UP (ref 3.5–5.3)
PROT SERPL-MCNC: 6.6 G/DL — SIGNIFICANT CHANGE UP (ref 6–8.3)
SODIUM SERPL-SCNC: 144 MMOL/L — SIGNIFICANT CHANGE UP (ref 135–145)
T4 FREE SERPL-MCNC: 1.1 NG/DL — SIGNIFICANT CHANGE UP (ref 0.9–1.8)
TSH SERPL-MCNC: 0.4 UIU/ML — SIGNIFICANT CHANGE UP (ref 0.27–4.2)

## 2023-05-11 NOTE — ASSESSMENT
[FreeTextEntry1] : Metastatic NSCLC with adenocarcinoma histology; tumor tested PDL1 Negative and lacks actionable mutations but tested TMB-High.  Treated with GK-SRS for brain metastases in Sept 2022.  Started first line single agent Pembrolizumab in Oct 2022; achieved SD. \par Restaging Brain MRI Feb 2023 with sustained intracranial response.  \par Restaging PET/CT March 2023 with sustained response.  \par Recommend:\par – Continue with first-line systemic therapy with single agent Pembrolizumab for as long as it benefits the patient.  \par -Repeat labs today.  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  Free T4 normal.  Monitor. \par – Brain Metastases:  F/U with Radiation oncology s/p GK-SRS to brain metastases.  Due for repeat Brain MRI this month (May).  Follows with Neuro-Onc as well. \par –Continue f/u with Palliative Care \par – PE: Continue Eliquis anticoagulation indefinitely given PE and Afib.\par – Continue follow-up and management with her PCP/cardiologist for her other ongoing medical problems and comorbidities.\par -Back pain: Pt with h/o spinal stenosis.  Takes gabapentin once daily. Continue Tylenol 1000mg tid prn and Gabapentin 300mg qhs.  Referral previously placed for PMR evaluation\par -Iron deficiency:  On PO iron repletion.  Monitor hemoglobin. \par -Dermatitis:  frequent moisturizer and OTC cortisone cream.  Can utilize OTC non-drowsy allergy medication if other measures are insufficient.  Consider Derm referral for skin/scalp complaints\par -Follow up prior to each cycle.  Restaging scan ordered for Late June.

## 2023-05-11 NOTE — REASON FOR VISIT
[Follow-Up Visit] : a follow-up [Formal Caregiver] : formal caregiver [FreeTextEntry2] : Lung Cancer

## 2023-05-11 NOTE — HISTORY OF PRESENT ILLNESS
[Disease: _____________________] : Disease: [unfilled] [AJCC Stage: ____] : AJCC Stage: [unfilled] [de-identified] : 82F former smoker with multiple comorbidities was undergoing orthopedic evaluation as an outpatient due to ongoing chronic back pain and gait imbalance.  She ultimately presented to [facility] ED on 7/19/2022.  Brain imaging demonstrated evidence of metastatic disease including a dominant right temporal lobe 3.1 cm enhancing mass with vasogenic edema.  CT scan of her body demonstrated a ANTHONY tumor with subcentimeter pulmonary nodules and a nonspecific adrenal nodule.  Bronchoscopy with biopsy performed on 7/20/2022 demonstrated adenocarcinoma consistent with lung primary; tumor tested PD-L1 negative and was negative for actionable mutations. however, tested TMB-High.  She underwent craniotomy resection of the dominant brain mass on 7/27/2022 with pathology revealing the same findings.  Hospital course was complicated by NSTEMI, Afib with RVR and RML PE.  She was put on medications for seizure prophylaxis.  She was subsequently discharged to acute rehab on 8/10–8/26/2022.  Treated with GK-SRS to brain metastases in Sept 2022. Started 1st line single agent Pembro in Oct 2022; achieved ND.  [de-identified] : – Left mainstem bronchus biopsy 7/20/2022: Poorly differentiated adenocarcinoma of lung origin.\par PD-L1 negative/0%.\par Foundation One:  No actionable mutations (TP53+, among others); TMB-High.  \par –Right brain tumor resection 7/27/2022: Metastatic non-small cell carcinoma, favor adenocarcinoma, consistent with lung origin.\par PD-L1 negative. [de-identified] : Patient started first-line single agent Pembrolizumab in Oct 2022; achieved [place].  \par Patient seen prior to continuation of treatment with single agent pembrolizumab.\par She reports scalp pruritus since her craniotomy.  She reports generalized pruritus and has been moisturizing frequently.  She reports watery eyes and saw an eye doctor and was put on prednisolone eyedrops.  She feels these are helping.  At her last visit, a referral was placed for PMR but does not appear to be scheduled as of yet.  She inquired whether she can dye her hair which would be fine.\par

## 2023-05-11 NOTE — RESULTS/DATA
[FreeTextEntry1] : -CT Brain 7/18/22:  Cystic versus a necrotic masses measure approximately 4.8 x 3.2 x 3.2 cm in the right temporal lobe and 1.4 x 1.3 x 1.6 cm in the medial left frontal lobe.  Moderate to large amount of vasogenic edema in the right frontal, parietal and temporal lobes. Trace edema in the left frontal lobe.  Regional mass effect in the right cerebral hemisphere with partial effacement of the ventricular system and 5 mm subfalcine herniation of the right frontal lobe underneath the cerebral falx.  The findings may represent intracranial metastatic disease versus glioblastoma. Contrast-enhanced brain MRI is recommended for further evaluation.\par \par -CT C/A/P 7/18/22:  Left upper lobe lung mass with partial atelectasis of the left upper lobe, concerning for primary lung neoplasm. Few scattered sub-4 mm pulmonary nodules, indeterminate.  Indeterminant left adrenal nodule for which contrast enhanced MRI is recommended for further evaluation.\par \par -MRI Brain 7/20/22:  Right temporal lobe peripherally enhancing, centrally necrotic 3.1 x 2.6 cm mass demonstrating mild diffusion restriction. There is surrounding vasogenic edema. 8 mm leftward midline shift. Right uncal herniation. Additional left parafalcine 1.3 x 0.9 cm rim-enhancing cystic lesion with mild surrounding vasogenic edema. Tiny 0.3 x 0.3 cm rim-enhancing lesion within the left frontal cortex. Findings are concerning for metastatic disease in the context of patient's known primary lung cancer. The left lateral ventricle is mildly dilated.\par \par -LE Dopplers 7/24/22:  No evidence of deep venous thrombosis in either lower extremity.\par -LE Dopplers 7/28/22:  No evidence of deep venous thrombosis in either lower extremity.\par \par -CT Chest Angio 8/3/22:  Pulmonary embolism in the lateral segmental branch of the right middle lobe. No evidence of right heart strain.  Interval decrease in left upper lobe mass.  Unchanged obstruction of the posterior bronchus, and stable fibroglandular lymph node.\par \par -Abdominal U/S 8/4/22:   Mildly distended gallbladder. No evidence of acute cholecystitis.  Possible mild hydronephrosis.\par \par -LE Dopplers 8/7/22:  No evidence of deep venous thrombosis in either lower extremity.\par \par -LE Dopplers 9/1/22:  No evidence of deep venous thrombosis in either lower extremity.\par \par -PET/CT 9/9/22:  Abnormal FDG-PET/CT scan.\par 1. FDG-avid left upper lobe lung mass abutting the mediastinum at level of AP window, with associated atelectasis along the mediastinum, unchanged as compared to CT dated 8/3/2022, corresponds to patient's known adenocarcinoma.\par 2. Right temporal craniotomy with photopenic low attenuation within right temporal lobe, compatible with vasogenic edema, gliosis, and/or postsurgical changes, better evaluated on contrast-enhanced CT or MRI.\par 3. Findings suggestive of left vocal cord paralysis, likely secondary to left upper lobe lung mass. Please correlate clinically and with direct visualization, as indicated.\par 4. Nonspecific hypermetabolism in small bowel and colon likely is related to metformin.\par 5. No scan evidence of metastatic disease.\par \par -PET/CT 12/8/22: \par 1. FDG-avid left upper lobe lung nodule is decreased in size and metabolism, compatible with a partial response to interval therapy. Interval decrease in adjacent left upper lobe paramediastinal opacity, likely atelectasis, which is unchanged in metabolism.\par 2. Right temporal craniotomy with photopenic low attenuation within right temporal lobe, compatible with vasogenic edema, gliosis, and/or postsurgical changes, better evaluated on contrast-enhanced CT or MRI, not significantly changed.\par 3. 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 Nonspecific hypermetabolism in small bowel and colon, unchanged, likely related to metformin.\par 5. No scan evidence of metastatic disease.\par \par -MRI 12/8/22:  Interval decrease in size and peripheral enhancement of the medial left frontal lesion. Smaller ring-enhancing lesion more laterally along the left frontal cortex no longer visualized.  No new lesions seen.  Postsurgical cavity right temporal lobe with mild adjacent enhancement. Continued follow-up recommended.\par \par –MRI Brain 2/23/23:  Stable medial left frontal lobe nonenhancing cystic lesion. Stable linear enhancement medial to the right temporal lobe surgical cavity. No new areas abnormal enhancement. Continued follow-up is recommended\par \par -PET/CT 3/2/23:  Compared to FDG PET/CT dated 12/8/2022:\par 1. FDG-avid left upper lobe lung nodule is not significantly changed in size or metabolism, compatible with stable disease. Interval decrease in size and metabolism of adjacent left upper lobe opacity abutting the mediastinum.\par 2. Nonspecific FDG-avid skin thickening, bilateral mid/lower anterior abdominal wall with new associated subcutaneous stranding, and increased in metabolism, likely secondary to inflammation. Please correlate clinically.\par 3. Right temporal craniotomy with photopenic low attenuation within right temporal lobe, compatible with vasogenic edema, gliosis, and/or postsurgical changes, better evaluated on contrast-enhanced CT or MRI, not significantly changed.\par 4. Nonspecific pancolonic hypermetabolism, not significantly changed, likely is related to metformin.\par 5. No scan evidence of metastatic disease.\par \par

## 2023-05-11 NOTE — PHYSICAL EXAM
[Ambulatory and capable of all self care but unable to carry out any work activities] : Status 2- Ambulatory and capable of all self care but unable to carry out any work activities. Up and about more than 50% of waking hours [Normal] : affect appropriate [de-identified] : Elderly woman NAD [de-identified] : No icterus  [de-identified] : MMM O/P Clear  [de-identified] : No LAD  [de-identified] : Clear  [de-identified] : S1 S2 [de-identified] : LE 1+ pitting edema (chronic) [de-identified] : No spine/CVA tenderness [de-identified] : Scattered erythema secondary to scratching

## 2023-05-18 ENCOUNTER — NON-APPOINTMENT (OUTPATIENT)
Age: 83
End: 2023-05-18

## 2023-05-22 ENCOUNTER — APPOINTMENT (OUTPATIENT)
Dept: GERIATRICS | Facility: CLINIC | Age: 83
End: 2023-05-22
Payer: MEDICARE

## 2023-05-22 PROCEDURE — 99215 OFFICE O/P EST HI 40 MIN: CPT | Mod: 95

## 2023-05-22 NOTE — REASON FOR VISIT
[Follow-Up] : a follow-up visit [Other: _____] : [unfilled] [Home] : at home, [unfilled] , at the time of the visit. [Medical Office: ([facility])___] : at the medical office located in  [Patient] : the patient

## 2023-05-25 ENCOUNTER — NON-APPOINTMENT (OUTPATIENT)
Age: 83
End: 2023-05-25

## 2023-05-25 NOTE — DATA REVIEWED
[FreeTextEntry1] : PET CT [facility] FDG SUBS   (03/02/2023): \par \par COMPARISON:  FDG-PET/CT dated  12/8/2022\par \par OTHER STUDIES USED FOR CORRELATION: MRI of brain dated 2/23/2023\par \par FINDINGS:\par \par HEAD/NECK: Again seen is incompletely imaged right temporal craniotomy. Photopenia in right temporal lobe corresponding to area of hypoattenuation suggestive of edema and/or encephalomalacia/postsurgical changes, unchanged.\par \par Asymmetric minimal FDG activity in the vocal cords, present on the right, and absent on the left, is less conspicuous than on the prior study.\par \par CHEST: No FDG-avid mediastinal or hilar lymph node.\par \par LUNGS: FDG-avid left upper lobe nodule abutting the mediastinum at the level of the AP window is not significantly changed in size and metabolism, measuring approximately 1.2 cm, SUV 4.7 (image 71); previous SUV 5.6.\par \par Small, minimally FDG-avid left upper lobe opacity all is further decreased in size and density (SUV 2.8; image 63; previous SUV 4.0).\par \par PLEURA/PERICARDIUM: No abnormal FDG activity. No effusion.\par \par HEPATOBILIARY/PANCREAS:  Physiologic FDG activity. Liver SUV mean is 3.1; previously 2.9.\par \par SPLEEN: Physiologic FDG activity. Normal in size.\par \par ADRENAL GLANDS: No abnormal FDG activity. No nodule.\par \par KIDNEYS/URINARY BLADDER: Physiologic excreted FDG activity.\par \par REPRODUCTIVE ORGANS: No abnormal FDG activity.\par \par ABDOMINOPELVIC NODES: No enlarged or FDG-avid lymph node.\par \par BOWEL/PERITONEUM/MESENTERY: Pancolonic hypermetabolism, not significantly changed, without corresponding abnormality on CT, likely is related to metformin.\par \par BONES/SOFT TISSUES: No abnormal FDG activity. Right shoulder prosthesis. Nonspecific, mildly increased radiotracer activity in right greater than left glenohumeral joints, unchanged. FDG-avid degenerative change in lumbar spine.\par \par Nonspecific, mildly FDG-avid skin thickening, bilateral mid/lower anterior abdominal wall, with new associated subcutaneous stranding, and increased in metabolism (SUV 4.7; image 170; previous SUV 2.0).\par \par IMPRESSION: Compared to FDG PET/CT dated 12/8/2022:\par \par 1. FDG-avid left upper lobe lung nodule is not significantly changed in size or metabolism, compatible with stable disease. Interval decrease in size and metabolism of adjacent left upper lobe opacity abutting the mediastinum.\par \par 2. Nonspecific FDG-avid skin thickening, bilateral mid/lower anterior abdominal wall with new associated subcutaneous stranding, and increased in metabolism, likely secondary to inflammation. Please correlate clinically.\par \par 3. Right temporal craniotomy with photopenic low attenuation within right temporal lobe, compatible with vasogenic edema, gliosis, and/or postsurgical changes, better evaluated on contrast-enhanced CT or MRI, not significantly changed.\par \par 4. Nonspecific pancolonic hypermetabolism, not significantly changed, likely is related to metformin.\par \par 5. No scan evidence of metastatic disease.

## 2023-05-25 NOTE — ASSESSMENT
[______] : HCP: [unfilled] [FreeTextEntry1] : 82yoF with:\par \par # Stage IV Lung Adenocarcinoma - On Keytruda since 10/11/22; s/p gamma knife surgery. Med Onc follow up. \par \par # Left hip pain - Increase gabapentin to 100mg QAM and C/w Gabapentin to 300mg QHS. Will continue to increase gabapentin to therapeutic dosing as tolerated. \par -Discussed how medical cannabis may be beneficial. She was previously certified through the [place] program. Recommend starting with 1:1 THC:CBD formulation at low dose of THC (~2mg/dose). THC:CBD compound creams may be beneficial. She remains hesitant. \par \par # Chronic lower back pain - Patient has a multitude of lumbar spine issues as indicated on MRI LS Spine (7/2022). C/w Tylenol 500-1000mg PRN. \par \par # Constipation - Recommend use of Miralax daily. \par \par # Adjustment d/o - Empathetic listening and validation of patient's emotions provided. \par \par # Encounter for palliative care- \par Explained the role of palliative care in enhancing quality of life in the setting of serious illness.\par Health Care Proxy (HCP) form on file.\par \par Follow up in PRN, call with questions or issues.

## 2023-05-25 NOTE — HISTORY OF PRESENT ILLNESS
[FreeTextEntry1] : 82yoF with stage IV NSCLC presents for follow-up palliative care visit. \par PMH significant for NSTEMI, DM2, Afib with RVR and RML PE, chronic low back pain, former smoker. \par \par Patient reports that she was suffering with chronic low back pain and progressive gait instability, was evaluated by orthopedist in 2022. She underwent imaging of the spinerain imaging demonstrated evidence of metastatic disease including a dominant right temporal lobe 3.1 cm enhancing mass with vasogenic edema. CT scan of her body demonstrated a ANTHONY tumor with subcentimeter pulmonary nodules and a nonspecific adrenal nodule. Bronchoscopy with biopsy performed on 2022 demonstrated adenocarcinoma consistent with lung primary; tumor tested PD-L1 negative and was negative for actionable mutations. She underwent craniotomy resection of the dominant brain mass on 2022 with pathology revealing the same findings. Hospital course was complicated by NSTEMI, A. fib with RVR and RML PE. She was put on medications for seizure prophylaxis. She was put on anticoagulation and currently remains on prophylactic dosing of anticoagulation with enoxaparin 40 mg subcu daily, pending clearance for therapeutic anticoagulation by neurosurgery. She was subsequently discharged to acute rehab on 8/10–2022. Has initiated home PT services. \par \par Pt met with Dr. Funes yesterday for evaluation for RT. \par Patient presents to establish with palliative care early on. \par She reports fatigue and weakness as her major issues lately. \par \par Interval history (23):\par Patient seen for follow up in treatment room. Therapy continues with Pembroluzimab, overall tolerating it well.\par She has a long history of spine pain, this has been managed with epidural injections with Dr. Salazar and physical therapy. Over the past month, her pain has changed. She reports a sharp stabbing pain to her left posterior hip. The pain is worse in the morning and  she wakes up in "agony". The pain makes it difficult for her to ambulate even short distances. She is using PRN Tylenol 1000mg one to two times a day, this eases the pain. She is taking gabapentin 300mg QHS. She was previously certified for medical cannabis but has not registered due to hesitations with using it. Appetite is stable. Constipated, moves her bowels every three days. She uses Miralax as-needed. Sleep is interrupted to urinate overnight, typically once a night. Mood fluctuates, she is upset with her loss of independence and ongoing pain. \par \par ROS:\par + mood labile\par + has been losing her hair (this precedes her cancer diagnosis)\par + appetite is low \par + constipation, uses PRN Miralax\par + sleep disturbances\par Denies n/v, constipation, trouble sleeping\par Remainder of ROS non-contributory\par \par Patient is , lives alone. She has one son who lives nearby (two other sons are ). She has a 24/7 HHA since being home, is requiring assistance with her ADLs. She has a shower chair, grab bars. Has not had any falls since being home. Prior to the surgery she was independent, driving. \par \par PMD/Cardiologist: Dr. Kimani Uribe \par Endocrinologist: Dr. Chaz Richardson\par \par I-STOP Ref#: [id number]

## 2023-05-25 NOTE — PHYSICAL EXAM
[General Appearance - Alert] : alert [General Appearance - In No Acute Distress] : in no acute distress [Oriented To Time, Place, And Person] : oriented to person, place, and time [Affect] : the affect was normal [FreeTextEntry1] : Tearful at times during the visit

## 2023-05-30 ENCOUNTER — APPOINTMENT (OUTPATIENT)
Dept: PHYSICAL MEDICINE AND REHAB | Facility: CLINIC | Age: 83
End: 2023-05-30

## 2023-06-01 ENCOUNTER — APPOINTMENT (OUTPATIENT)
Dept: HEMATOLOGY ONCOLOGY | Facility: CLINIC | Age: 83
End: 2023-06-01
Payer: MEDICARE

## 2023-06-01 ENCOUNTER — RESULT REVIEW (OUTPATIENT)
Age: 83
End: 2023-06-01

## 2023-06-01 ENCOUNTER — APPOINTMENT (OUTPATIENT)
Dept: INFUSION THERAPY | Facility: HOSPITAL | Age: 83
End: 2023-06-01

## 2023-06-01 VITALS
SYSTOLIC BLOOD PRESSURE: 135 MMHG | HEIGHT: 62 IN | TEMPERATURE: 97 F | HEART RATE: 69 BPM | RESPIRATION RATE: 16 BRPM | OXYGEN SATURATION: 99 % | WEIGHT: 162.02 LBS | BODY MASS INDEX: 29.81 KG/M2 | DIASTOLIC BLOOD PRESSURE: 73 MMHG

## 2023-06-01 LAB
ALBUMIN SERPL ELPH-MCNC: 4.4 G/DL — SIGNIFICANT CHANGE UP (ref 3.3–5)
ALP SERPL-CCNC: 85 U/L — SIGNIFICANT CHANGE UP (ref 40–120)
ALT FLD-CCNC: 19 U/L — SIGNIFICANT CHANGE UP (ref 10–45)
ANION GAP SERPL CALC-SCNC: 17 MMOL/L — SIGNIFICANT CHANGE UP (ref 5–17)
AST SERPL-CCNC: 31 U/L — SIGNIFICANT CHANGE UP (ref 10–40)
BASOPHILS # BLD AUTO: 0.04 K/UL — SIGNIFICANT CHANGE UP (ref 0–0.2)
BASOPHILS NFR BLD AUTO: 0.5 % — SIGNIFICANT CHANGE UP (ref 0–2)
BILIRUB SERPL-MCNC: 0.3 MG/DL — SIGNIFICANT CHANGE UP (ref 0.2–1.2)
BUN SERPL-MCNC: 21 MG/DL — SIGNIFICANT CHANGE UP (ref 7–23)
CALCIUM SERPL-MCNC: 9.6 MG/DL — SIGNIFICANT CHANGE UP (ref 8.4–10.5)
CHLORIDE SERPL-SCNC: 103 MMOL/L — SIGNIFICANT CHANGE UP (ref 96–108)
CO2 SERPL-SCNC: 20 MMOL/L — LOW (ref 22–31)
CREAT SERPL-MCNC: 0.76 MG/DL — SIGNIFICANT CHANGE UP (ref 0.5–1.3)
EGFR: 78 ML/MIN/1.73M2 — SIGNIFICANT CHANGE UP
EOSINOPHIL # BLD AUTO: 0.24 K/UL — SIGNIFICANT CHANGE UP (ref 0–0.5)
EOSINOPHIL NFR BLD AUTO: 3.2 % — SIGNIFICANT CHANGE UP (ref 0–6)
GLUCOSE SERPL-MCNC: 176 MG/DL — HIGH (ref 70–99)
HCT VFR BLD CALC: 39.1 % — SIGNIFICANT CHANGE UP (ref 34.5–45)
HGB BLD-MCNC: 13.2 G/DL — SIGNIFICANT CHANGE UP (ref 11.5–15.5)
IMM GRANULOCYTES NFR BLD AUTO: 0.4 % — SIGNIFICANT CHANGE UP (ref 0–0.9)
LYMPHOCYTES # BLD AUTO: 2.18 K/UL — SIGNIFICANT CHANGE UP (ref 1–3.3)
LYMPHOCYTES # BLD AUTO: 28.8 % — SIGNIFICANT CHANGE UP (ref 13–44)
MCHC RBC-ENTMCNC: 31.7 PG — SIGNIFICANT CHANGE UP (ref 27–34)
MCHC RBC-ENTMCNC: 33.8 G/DL — SIGNIFICANT CHANGE UP (ref 32–36)
MCV RBC AUTO: 94 FL — SIGNIFICANT CHANGE UP (ref 80–100)
MONOCYTES # BLD AUTO: 0.39 K/UL — SIGNIFICANT CHANGE UP (ref 0–0.9)
MONOCYTES NFR BLD AUTO: 5.2 % — SIGNIFICANT CHANGE UP (ref 2–14)
NEUTROPHILS # BLD AUTO: 4.69 K/UL — SIGNIFICANT CHANGE UP (ref 1.8–7.4)
NEUTROPHILS NFR BLD AUTO: 61.9 % — SIGNIFICANT CHANGE UP (ref 43–77)
NRBC # BLD: 0 /100 WBCS — SIGNIFICANT CHANGE UP (ref 0–0)
PLATELET # BLD AUTO: 194 K/UL — SIGNIFICANT CHANGE UP (ref 150–400)
POTASSIUM SERPL-MCNC: 5.3 MMOL/L — SIGNIFICANT CHANGE UP (ref 3.5–5.3)
POTASSIUM SERPL-SCNC: 5.3 MMOL/L — SIGNIFICANT CHANGE UP (ref 3.5–5.3)
PROT SERPL-MCNC: 6.4 G/DL — SIGNIFICANT CHANGE UP (ref 6–8.3)
RBC # BLD: 4.16 M/UL — SIGNIFICANT CHANGE UP (ref 3.8–5.2)
RBC # FLD: 12.4 % — SIGNIFICANT CHANGE UP (ref 10.3–14.5)
SODIUM SERPL-SCNC: 140 MMOL/L — SIGNIFICANT CHANGE UP (ref 135–145)
WBC # BLD: 7.57 K/UL — SIGNIFICANT CHANGE UP (ref 3.8–10.5)
WBC # FLD AUTO: 7.57 K/UL — SIGNIFICANT CHANGE UP (ref 3.8–10.5)

## 2023-06-01 PROCEDURE — 99214 OFFICE O/P EST MOD 30 MIN: CPT

## 2023-06-02 NOTE — HISTORY OF PRESENT ILLNESS
[Disease: _____________________] : Disease: [unfilled] [AJCC Stage: ____] : AJCC Stage: [unfilled] [de-identified] : 82F former smoker with multiple comorbidities was undergoing orthopedic evaluation as an outpatient due to ongoing chronic back pain and gait imbalance.  She ultimately presented to [facility] ED on 7/19/2022.  Brain imaging demonstrated evidence of metastatic disease including a dominant right temporal lobe 3.1 cm enhancing mass with vasogenic edema.  CT scan of her body demonstrated a ANTHONY tumor with subcentimeter pulmonary nodules and a nonspecific adrenal nodule.  Bronchoscopy with biopsy performed on 7/20/2022 demonstrated adenocarcinoma consistent with lung primary; tumor tested PD-L1 negative and was negative for actionable mutations. however, tested TMB-High.  She underwent craniotomy resection of the dominant brain mass on 7/27/2022 with pathology revealing the same findings.  Hospital course was complicated by NSTEMI, Afib with RVR and RML PE.  She was put on medications for seizure prophylaxis.  She was subsequently discharged to acute rehab on 8/10–8/26/2022.  Treated with GK-SRS to brain metastases in Sept 2022. Started 1st line single agent Pembro in Oct 2022; achieved [place].  [de-identified] : Patient started first-line single agent Pembrolizumab in Oct 2022; achieved [place].  \par Patient seen prior to continuation of treatment with single agent pembrolizumab.\par \par Patient seen today with caregiver and daughter. Complains of non cancer related back pain for which she takes gabapentin increased to 200mg am and 300mg pm. She had an appointment with PM&R which she had cancelled and was advised to reschedule. She continues to complain of pruritic skin, she has been using hydrocortisone cream with minimal relief.  [de-identified] : – Left mainstem bronchus biopsy 7/20/2022: Poorly differentiated adenocarcinoma of lung origin.\par PD-L1 negative/0%.\par Foundation One:  No actionable mutations (TP53+, among others); TMB-High.  \par –Right brain tumor resection 7/27/2022: Metastatic non-small cell carcinoma, favor adenocarcinoma, consistent with lung origin.\par PD-L1 negative.

## 2023-06-02 NOTE — ASSESSMENT
[FreeTextEntry1] : Metastatic NSCLC with adenocarcinoma histology; tumor tested PDL1 Negative and lacks actionable mutations but tested TMB-High.  Treated with GK-SRS for brain metastases in Sept 2022.  Started first line single agent Pembrolizumab in Oct 2022; achieved [place]. \par Restaging Brain MRI Feb 2023 with sustained intracranial response.  \par Restaging PET/CT March 2023 with sustained response.  \par Recommend:\par – Continue with first-line systemic therapy with single agent Pembrolizumab for as long as it benefits the patient.  \par -Repeat labs today.  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  Free T4 normal.  Monitor. \par – Brain Metastases:  F/U with Radiation oncology s/p GK-SRS to brain metastases.  Repeat Brain MRI scheduled for june. Follows with Neuro-Onc as well. \par –Continue f/u with Palliative Care \par – PE: Continue Eliquis anticoagulation indefinitely given PE and Afib.\par – Continue follow-up and management with her PCP/cardiologist for her other ongoing medical problems and comorbidities.\par -Back pain: Pt with h/o spinal stenosis.  Takes gabapentin twice a day, recently increased by palliative care. Continue Tylenol 1000mg tid prn and Gabapentin 200mg qam. continue 300mg qhs.  Referral previously placed for PMR evaluation - she will call to reschedule \par -Iron deficiency:  On PO iron repletion.  Monitor hemoglobin. \par -Dermatitis:  frequent moisturizer and OTC cortisone cream.  Can utilize OTC non-drowsy allergy medication if other measures are insufficient.  Consider Derm referral for skin/scalp complaints\par -Follow up prior to each cycle.  Restaging scan ordered for Late June, appropriately scheduled.

## 2023-06-02 NOTE — PHYSICAL EXAM
[Ambulatory and capable of all self care but unable to carry out any work activities] : Status 2- Ambulatory and capable of all self care but unable to carry out any work activities. Up and about more than 50% of waking hours [Normal] : affect appropriate [de-identified] : Elderly woman NAD [de-identified] : No icterus  [de-identified] : MMM O/P Clear  [de-identified] : No LAD  [de-identified] : Clear  [de-identified] : S1 S2 [de-identified] : LE edema resolved [de-identified] : No spine/CVA tenderness [de-identified] : Scattered erythema secondary to scratching. No rash noted

## 2023-06-09 RX ORDER — FERROUS GLUCONATE 324(38)MG
324 (38 FE) TABLET ORAL TWICE DAILY
Qty: 60 | Refills: 3 | Status: ACTIVE | COMMUNITY
Start: 2022-10-13 | End: 1900-01-01

## 2023-06-15 ENCOUNTER — OUTPATIENT (OUTPATIENT)
Dept: OUTPATIENT SERVICES | Facility: HOSPITAL | Age: 83
LOS: 1 days | End: 2023-06-15
Payer: MEDICARE

## 2023-06-15 ENCOUNTER — APPOINTMENT (OUTPATIENT)
Dept: MRI IMAGING | Facility: CLINIC | Age: 83
End: 2023-06-15
Payer: MEDICARE

## 2023-06-15 DIAGNOSIS — I25.10 ATHEROSCLEROTIC HEART DISEASE OF NATIVE CORONARY ARTERY WITHOUT ANGINA PECTORIS: Chronic | ICD-10-CM

## 2023-06-15 DIAGNOSIS — C79.31 SECONDARY MALIGNANT NEOPLASM OF BRAIN: ICD-10-CM

## 2023-06-15 DIAGNOSIS — I73.9 PERIPHERAL VASCULAR DISEASE, UNSPECIFIED: Chronic | ICD-10-CM

## 2023-06-15 PROCEDURE — A9585: CPT

## 2023-06-15 PROCEDURE — 70553 MRI BRAIN STEM W/O & W/DYE: CPT | Mod: 26,MH

## 2023-06-15 PROCEDURE — 70553 MRI BRAIN STEM W/O & W/DYE: CPT

## 2023-06-19 ENCOUNTER — NON-APPOINTMENT (OUTPATIENT)
Age: 83
End: 2023-06-19

## 2023-06-20 ENCOUNTER — NON-APPOINTMENT (OUTPATIENT)
Age: 83
End: 2023-06-20

## 2023-06-22 ENCOUNTER — OUTPATIENT (OUTPATIENT)
Dept: OUTPATIENT SERVICES | Facility: HOSPITAL | Age: 83
LOS: 1 days | End: 2023-06-22
Payer: MEDICARE

## 2023-06-22 ENCOUNTER — APPOINTMENT (OUTPATIENT)
Dept: NUCLEAR MEDICINE | Facility: IMAGING CENTER | Age: 83
End: 2023-06-22
Payer: MEDICARE

## 2023-06-22 DIAGNOSIS — I73.9 PERIPHERAL VASCULAR DISEASE, UNSPECIFIED: Chronic | ICD-10-CM

## 2023-06-22 DIAGNOSIS — C34.12 MALIGNANT NEOPLASM OF UPPER LOBE, LEFT BRONCHUS OR LUNG: ICD-10-CM

## 2023-06-22 DIAGNOSIS — I25.10 ATHEROSCLEROTIC HEART DISEASE OF NATIVE CORONARY ARTERY WITHOUT ANGINA PECTORIS: Chronic | ICD-10-CM

## 2023-06-22 PROCEDURE — A9552: CPT

## 2023-06-22 PROCEDURE — 78815 PET IMAGE W/CT SKULL-THIGH: CPT | Mod: 26,PS,MH

## 2023-06-22 PROCEDURE — 78815 PET IMAGE W/CT SKULL-THIGH: CPT

## 2023-06-27 ENCOUNTER — APPOINTMENT (OUTPATIENT)
Dept: RADIATION ONCOLOGY | Facility: CLINIC | Age: 83
End: 2023-06-27
Payer: MEDICARE

## 2023-06-27 ENCOUNTER — APPOINTMENT (OUTPATIENT)
Dept: HEMATOLOGY ONCOLOGY | Facility: CLINIC | Age: 83
End: 2023-06-27
Payer: MEDICARE

## 2023-06-27 VITALS
TEMPERATURE: 95.18 F | RESPIRATION RATE: 16 BRPM | HEIGHT: 62.5 IN | DIASTOLIC BLOOD PRESSURE: 83 MMHG | BODY MASS INDEX: 28.89 KG/M2 | SYSTOLIC BLOOD PRESSURE: 126 MMHG | OXYGEN SATURATION: 98 % | HEART RATE: 75 BPM | WEIGHT: 161 LBS

## 2023-06-27 VITALS
HEART RATE: 79 BPM | TEMPERATURE: 96.9 F | RESPIRATION RATE: 16 BRPM | DIASTOLIC BLOOD PRESSURE: 74 MMHG | OXYGEN SATURATION: 99 % | SYSTOLIC BLOOD PRESSURE: 123 MMHG | BODY MASS INDEX: 29.6 KG/M2 | WEIGHT: 161.82 LBS

## 2023-06-27 PROCEDURE — 99212 OFFICE O/P EST SF 10 MIN: CPT

## 2023-06-27 PROCEDURE — 99214 OFFICE O/P EST MOD 30 MIN: CPT

## 2023-06-27 RX ORDER — TRAMADOL HYDROCHLORIDE 50 MG/1
50 TABLET, COATED ORAL
Refills: 0 | Status: ACTIVE | COMMUNITY

## 2023-06-27 NOTE — REASON FOR VISIT
[Routine Follow-Up] : routine follow-up visit for [Brain Metastasis] : brain metastasis [Family Member] : family member [Formal Caregiver] : formal caregiver

## 2023-06-27 NOTE — VITALS
[Least Pain Intensity: 0/10] : 0/10 [Maximal Pain Intensity: 5/10] : 5/10 [Pain Location: ___] : Pain Location: [unfilled] [70: Cares for self; unalbe to carry on normal activity or do active work.] : 70: Cares for self; unable to carry on normal activity or do active work.

## 2023-06-27 NOTE — PHYSICAL EXAM
[] : no respiratory distress [Motor Tone] : muscle strength and tone were normal [Normal] : oriented to person, place and time, the affect was normal, the mood was normal and not anxious [de-identified] : In a wheelchair

## 2023-06-27 NOTE — REVIEW OF SYSTEMS
[Dizziness] : dizziness [Difficulty Walking] : difficulty walking [Negative] : Psychiatric [Fever] : no fever [Chills] : no chills [Night Sweats] : no night sweats [Eye Pain] : no eye pain [Red Eyes] : eyes not red [Dry Eyes] : no dryness of the eyes [Dysphagia] : no dysphagia [Loss of Hearing] : no loss of hearing [Chest Pain] : no chest pain [Palpitations] : no palpitations [Shortness Of Breath] : no shortness of breath [Wheezing] : no wheezing [FreeTextEntry3] : notes eyes are watery since surgery [FreeTextEntry4] : still bothered by voice [FreeTextEntry9] : denies focal weakness. ambulation long distances remains hard [de-identified] : unsteady since surgery, stable. uses wheelchair for appointments. intermittent mild head pain. forgetfulness, notes stable since prior to RT. Unsteady gait since surgery, stable.

## 2023-06-27 NOTE — HISTORY OF PRESENT ILLNESS
[FreeTextEntry1] : Ms. Ros Phan presented initially for evaluation of brain mets on 9/6/2022 (notably a right temporal lobe peripherally enhancing, centrally necrotic 3.1 x 2.6 cm mass demonstrating mild diffusion restriction with surrounding vasogenic edema as well as a left parafalcine lesion) s/p craniotomy and resection of R temporal lesion on 7/27/2022, and now being considered for RT. Patient has a lung primary, NSCLC (adenocarcinoma on histology, PD-L1 negative and negative for targetable mutations).\par She completed radiation therapy for a total of 2700 cgy to a right temporal cavity and a left frontal lesion over 3 fractions 9/19-9/22/2022, and to a left frontal lesion 1800 cgy over 1 fraction on 9/22/2022\par \par ONCOLOGIC HISTORY\par 82F former smoker, PMH of HTN, HLD, CAD, PAD, MI, DM2, peripheral neuropathy who was undergoing outpatient orthopedic evaluation due to ongoing chronic back pain (20+ years) and gait imbalance. MRI outpatient was reported to be abnormal and told to go to ED for further imaging. She ultimately presented to [facility] ED on 7/19/2022. Brain imaging demonstrated evidence of metastatic disease including a dominant right temporal lobe 3.1 cm enhancing mass with vasogenic edema. There was also a Left parafalcine lesion noted. CT scan of her body demonstrated a ANTHONY tumor with subcentimeter pulmonary nodules and a nonspecific adrenal nodule. Bronchoscopy with biopsy performed on 7/20/2022 demonstrated adenocarcinoma consistent with lung primary; tumor tested PD-L1 negative and was negative for actionable mutations. She underwent craniotomy resection of the dominant brain mass on 7/27/2022 with pathology revealing the same findings. Hospital course was complicated by NSTEMI, A. fib with RVR and RML PE. She was put on medications for seizure prophylaxis. She was put on anticoagulation and currently remains on prophylactic dosing of anticoagulation with enoxaparin 40 mg subcu daily, pending clearance for therapeutic anticoagulation by neurosurgery. She was subsequently discharged to acute rehab on 8/10–8/26/2022.\par \par 9/5/2022: Patient presents for initial consult today. She is off of steroids at this time. Continues on keppra and deptakote at this time. Recently follow up with Dr. Ramon and plans to start patient on carboplatin/pemetrexed/pembro. Patient has a PET scan scheduled for Friday. She notes blurry vision in the L eye, short-term memory issues, gait instability following surgery. \par \par 12/15/2022- Ms. Phan presents today for post treatment evaluation.\par Brain MRI 12/8/2022 showed Interval decrease in size and peripheral enhancement of the medial left frontal lesion. Smaller ring-enhancing lesion more laterally along the left frontal cortex no longer visualized.\par No new lesions seen\par Postsurgical cavity right temporal lobe with mild adjacent enhancement. Continued follow-up recommended.\par \par She continues to follow with Dr. Ramon. continues on first line therapy with single agent pembrolizumab. \par \par PET scan 12/8/2022 showed 1. FDG-avid left upper lobe lung nodule is decreased in size and metabolism, compatible with a partial response to interval therapy. Interval decrease in adjacent left upper lobe paramediastinal opacity, likely atelectasis, which is unchanged in metabolism.\par 2. Right temporal craniotomy with photopenic low attenuation within right temporal lobe, compatible with vasogenic edema, gliosis, and/or postsurgical changes, better evaluated on contrast-enhanced CT or MRI, not significantly changed.\par 3. 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 Nonspecific hypermetabolism in small bowel and colon, unchanged, likely related to metformin.\par 5. No scan evidence of metastatic disease.\par \par Today she feels well overall. no headaches, no nausea. notes some persistent dysphagia since post surgery. still having trouble ambulating since surgery due to imbalance, but not worse at all. Uses a wheelchair only when out for appointments. memory is poor, remaining since surgery, not worse. \par \par 3/2/2023- Ms. Phan presents today for follow up. Has continued to follow with Dr. Ramon. continues on pembrolizumab. has followed with Dr. Loomis and Dr. Hauser regarding intermittent aphasia episodes. keppra increased to 750 mg BID. Noted to be high vascular risk. \par \par Brain MRI 2/23/2023 showed Stable medial left frontal lobe nonenhancing cystic lesion. Stable linear enhancement medial to the right temporal lobe surgical cavity. No new areas abnormal enhancement. Continued follow-up is recommended\par \par Saw Dr. Cox regarding possible vocal cord procedures.  PET scan to be done today 3/2. \par \par Carotid ultrasound 2/23/2023 showed Bilateral 50-69% internal carotid artery stenosis.Right external carotid artery stenosis.\par Bilateral nonspecific thyroid nodules. Follow-up dedicated thyroid ultrasound is recommended.\par \par Today she feels overall well. notes some trouble with balance persists. mild head pain every once in a while. did have an episode of speech trouble yesterday. no nausea, no vomiting, no focal weakness. using a wheelchair today, but does not use it much at home. notes eyes and nose are draining since surgery.\par \par 6/27/23 Ms Phan presents for follow up. Brain MRI done on 6/15/23 IMPRESSION:\par Stable right temporal postoperative changes. Stable subcentimeter focus of enhancement along the medial aspect of the cavity, likely posttreatment changes. No new or worsening enhancement identified.\par \par 6/22/23 PET/CT:\par IMPRESSION: Compared to FDG PET/CT dated 3/2/2023:\par 1. Mildly FDG-avid left upper lobe lung nodule is not significantly changed in size or metabolism, compatible with stable disease. A minimally FDG-avid adjacent left upper lobe opacity abutting the mediastinum is decreased in size and unchanged and avidity.\par 2. Right temporal craniotomy with photopenic low attenuation within right temporal lobe, compatible with posttreatment changes, unchanged.\par 3. Nonspecific diffuse distal small bowel hypermetabolism, increased as compared to prior study, and pancolonic hypermetabolism, decreased as compared to prior study, without corresponding abnormalities on CT, likely are related to metformin.\par 4. Nonspecific mildly FDG-avid skin thickening and subcutaneous stranding, bilateral mid/lower anterior abdominal wall, decreased as compared to prior study. Please correlate clinically.\par 5. Remainder of study demonstrates no evidence of FDG-avid disease.\par \par Today, she followed up with Dr Ramon, continues on Pembrolizumab. Follows outside pain management for history of spinal stenosis and associated back pain. Taking Gabapentin and Tylenol and recently started Tramadol 50 mg TID. She is ambulatory without assistive device for short distance\par

## 2023-06-29 ENCOUNTER — APPOINTMENT (OUTPATIENT)
Dept: INFUSION THERAPY | Facility: HOSPITAL | Age: 83
End: 2023-06-29

## 2023-06-29 ENCOUNTER — NON-APPOINTMENT (OUTPATIENT)
Age: 83
End: 2023-06-29

## 2023-06-29 ENCOUNTER — RESULT REVIEW (OUTPATIENT)
Age: 83
End: 2023-06-29

## 2023-06-29 LAB
ALBUMIN SERPL ELPH-MCNC: 4.4 G/DL — SIGNIFICANT CHANGE UP (ref 3.3–5)
ALP SERPL-CCNC: 74 U/L — SIGNIFICANT CHANGE UP (ref 40–120)
ALT FLD-CCNC: 19 U/L — SIGNIFICANT CHANGE UP (ref 10–45)
ANION GAP SERPL CALC-SCNC: 17 MMOL/L — SIGNIFICANT CHANGE UP (ref 5–17)
AST SERPL-CCNC: 32 U/L — SIGNIFICANT CHANGE UP (ref 10–40)
BASOPHILS # BLD AUTO: 0.03 K/UL — SIGNIFICANT CHANGE UP (ref 0–0.2)
BASOPHILS NFR BLD AUTO: 0.4 % — SIGNIFICANT CHANGE UP (ref 0–2)
BILIRUB SERPL-MCNC: 0.4 MG/DL — SIGNIFICANT CHANGE UP (ref 0.2–1.2)
BUN SERPL-MCNC: 22 MG/DL — SIGNIFICANT CHANGE UP (ref 7–23)
CALCIUM SERPL-MCNC: 9.5 MG/DL — SIGNIFICANT CHANGE UP (ref 8.4–10.5)
CHLORIDE SERPL-SCNC: 101 MMOL/L — SIGNIFICANT CHANGE UP (ref 96–108)
CO2 SERPL-SCNC: 21 MMOL/L — LOW (ref 22–31)
CREAT SERPL-MCNC: 0.79 MG/DL — SIGNIFICANT CHANGE UP (ref 0.5–1.3)
EGFR: 74 ML/MIN/1.73M2 — SIGNIFICANT CHANGE UP
EOSINOPHIL # BLD AUTO: 0.28 K/UL — SIGNIFICANT CHANGE UP (ref 0–0.5)
EOSINOPHIL NFR BLD AUTO: 3.6 % — SIGNIFICANT CHANGE UP (ref 0–6)
GLUCOSE SERPL-MCNC: 186 MG/DL — HIGH (ref 70–99)
HCT VFR BLD CALC: 38.5 % — SIGNIFICANT CHANGE UP (ref 34.5–45)
HGB BLD-MCNC: 13 G/DL — SIGNIFICANT CHANGE UP (ref 11.5–15.5)
IMM GRANULOCYTES NFR BLD AUTO: 0.4 % — SIGNIFICANT CHANGE UP (ref 0–0.9)
LYMPHOCYTES # BLD AUTO: 1.85 K/UL — SIGNIFICANT CHANGE UP (ref 1–3.3)
LYMPHOCYTES # BLD AUTO: 23.6 % — SIGNIFICANT CHANGE UP (ref 13–44)
MCHC RBC-ENTMCNC: 32.1 PG — SIGNIFICANT CHANGE UP (ref 27–34)
MCHC RBC-ENTMCNC: 33.8 G/DL — SIGNIFICANT CHANGE UP (ref 32–36)
MCV RBC AUTO: 95.1 FL — SIGNIFICANT CHANGE UP (ref 80–100)
MONOCYTES # BLD AUTO: 0.45 K/UL — SIGNIFICANT CHANGE UP (ref 0–0.9)
MONOCYTES NFR BLD AUTO: 5.7 % — SIGNIFICANT CHANGE UP (ref 2–14)
NEUTROPHILS # BLD AUTO: 5.2 K/UL — SIGNIFICANT CHANGE UP (ref 1.8–7.4)
NEUTROPHILS NFR BLD AUTO: 66.3 % — SIGNIFICANT CHANGE UP (ref 43–77)
NRBC # BLD: 0 /100 WBCS — SIGNIFICANT CHANGE UP (ref 0–0)
PLATELET # BLD AUTO: 251 K/UL — SIGNIFICANT CHANGE UP (ref 150–400)
POTASSIUM SERPL-MCNC: 4.8 MMOL/L — SIGNIFICANT CHANGE UP (ref 3.5–5.3)
POTASSIUM SERPL-SCNC: 4.8 MMOL/L — SIGNIFICANT CHANGE UP (ref 3.5–5.3)
PROT SERPL-MCNC: 6.4 G/DL — SIGNIFICANT CHANGE UP (ref 6–8.3)
RBC # BLD: 4.05 M/UL — SIGNIFICANT CHANGE UP (ref 3.8–5.2)
RBC # FLD: 12.7 % — SIGNIFICANT CHANGE UP (ref 10.3–14.5)
SODIUM SERPL-SCNC: 139 MMOL/L — SIGNIFICANT CHANGE UP (ref 135–145)
T4 FREE+ TSH PNL SERPL: 0.37 UIU/ML — SIGNIFICANT CHANGE UP (ref 0.27–4.2)
WBC # BLD: 7.84 K/UL — SIGNIFICANT CHANGE UP (ref 3.8–10.5)
WBC # FLD AUTO: 7.84 K/UL — SIGNIFICANT CHANGE UP (ref 3.8–10.5)

## 2023-07-04 NOTE — RESULTS/DATA
[FreeTextEntry1] : -CT Brain 7/18/22:  Cystic versus a necrotic masses measure approximately 4.8 x 3.2 x 3.2 cm in the right temporal lobe and 1.4 x 1.3 x 1.6 cm in the medial left frontal lobe.  Moderate to large amount of vasogenic edema in the right frontal, parietal and temporal lobes. Trace edema in the left frontal lobe.  Regional mass effect in the right cerebral hemisphere with partial effacement of the ventricular system and 5 mm subfalcine herniation of the right frontal lobe underneath the cerebral falx.  The findings may represent intracranial metastatic disease versus glioblastoma. Contrast-enhanced brain MRI is recommended for further evaluation.\par \par -CT C/A/P 7/18/22:  Left upper lobe lung mass with partial atelectasis of the left upper lobe, concerning for primary lung neoplasm. Few scattered sub-4 mm pulmonary nodules, indeterminate.  Indeterminant left adrenal nodule for which contrast enhanced MRI is recommended for further evaluation.\par \par -MRI Brain 7/20/22:  Right temporal lobe peripherally enhancing, centrally necrotic 3.1 x 2.6 cm mass demonstrating mild diffusion restriction. There is surrounding vasogenic edema. 8 mm leftward midline shift. Right uncal herniation. Additional left parafalcine 1.3 x 0.9 cm rim-enhancing cystic lesion with mild surrounding vasogenic edema. Tiny 0.3 x 0.3 cm rim-enhancing lesion within the left frontal cortex. Findings are concerning for metastatic disease in the context of patient's known primary lung cancer. The left lateral ventricle is mildly dilated.\par \par -LE Dopplers 7/24/22:  No evidence of deep venous thrombosis in either lower extremity.\par -LE Dopplers 7/28/22:  No evidence of deep venous thrombosis in either lower extremity.\par \par -CT Chest Angio 8/3/22:  Pulmonary embolism in the lateral segmental branch of the right middle lobe. No evidence of right heart strain.  Interval decrease in left upper lobe mass.  Unchanged obstruction of the posterior bronchus, and stable fibroglandular lymph node.\par \par -Abdominal U/S 8/4/22:   Mildly distended gallbladder. No evidence of acute cholecystitis.  Possible mild hydronephrosis.\par \par -LE Dopplers 8/7/22:  No evidence of deep venous thrombosis in either lower extremity.\par \par -LE Dopplers 9/1/22:  No evidence of deep venous thrombosis in either lower extremity.\par \par -PET/CT 9/9/22:  Abnormal FDG-PET/CT scan.\par 1. FDG-avid left upper lobe lung mass abutting the mediastinum at level of AP window, with associated atelectasis along the mediastinum, unchanged as compared to CT dated 8/3/2022, corresponds to patient's known adenocarcinoma.\par 2. Right temporal craniotomy with photopenic low attenuation within right temporal lobe, compatible with vasogenic edema, gliosis, and/or postsurgical changes, better evaluated on contrast-enhanced CT or MRI.\par 3. Findings suggestive of left vocal cord paralysis, likely secondary to left upper lobe lung mass. Please correlate clinically and with direct visualization, as indicated.\par 4. Nonspecific hypermetabolism in small bowel and colon likely is related to metformin.\par 5. No scan evidence of metastatic disease.\par \par -PET/CT 12/8/22: \par 1. FDG-avid left upper lobe lung nodule is decreased in size and metabolism, compatible with a partial response to interval therapy. Interval decrease in adjacent left upper lobe paramediastinal opacity, likely atelectasis, which is unchanged in metabolism.\par 2. Right temporal craniotomy with photopenic low attenuation within right temporal lobe, compatible with vasogenic edema, gliosis, and/or postsurgical changes, better evaluated on contrast-enhanced CT or MRI, not significantly changed.\par 3. 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 Nonspecific hypermetabolism in small bowel and colon, unchanged, likely related to metformin.\par 5. No scan evidence of metastatic disease.\par \par -MRI 12/8/22:  Interval decrease in size and peripheral enhancement of the medial left frontal lesion. Smaller ring-enhancing lesion more laterally along the left frontal cortex no longer visualized.  No new lesions seen.  Postsurgical cavity right temporal lobe with mild adjacent enhancement. Continued follow-up recommended.\par \par –MRI Brain 2/23/23:  Stable medial left frontal lobe nonenhancing cystic lesion. Stable linear enhancement medial to the right temporal lobe surgical cavity. No new areas abnormal enhancement. Continued follow-up is recommended\par \par -PET/CT 3/2/23:  Compared to FDG PET/CT dated 12/8/2022:\par 1. FDG-avid left upper lobe lung nodule is not significantly changed in size or metabolism, compatible with stable disease. Interval decrease in size and metabolism of adjacent left upper lobe opacity abutting the mediastinum.\par 2. Nonspecific FDG-avid skin thickening, bilateral mid/lower anterior abdominal wall with new associated subcutaneous stranding, and increased in metabolism, likely secondary to inflammation. Please correlate clinically.\par 3. Right temporal craniotomy with photopenic low attenuation within right temporal lobe, compatible with vasogenic edema, gliosis, and/or postsurgical changes, better evaluated on contrast-enhanced CT or MRI, not significantly changed.\par 4. Nonspecific pancolonic hypermetabolism, not significantly changed, likely is related to metformin.\par 5. No scan evidence of metastatic disease.\par \par Images Reviewed/Interpreted:\par \par -MRI Brain 6/15/23:  Stable right temporal postoperative changes. Stable subcentimeter focus of enhancement along the medial aspect of the cavity, likely posttreatment changes. No new or worsening enhancement identified.\par \par -PET/CT 6/22/23:  Compared to FDG PET/CT dated 3/2/2023:\par 1. Mildly FDG-avid left upper lobe lung nodule is not significantly changed in size or metabolism, compatible with stable disease. A minimally FDG-avid adjacent left upper lobe opacity abutting the mediastinum is decreased in size and unchanged and avidity.\par 2. Right temporal craniotomy with photopenic low attenuation within right temporal lobe, compatible with posttreatment changes, unchanged.\par 3. Nonspecific diffuse distal small bowel hypermetabolism, increased as compared to prior study, and pancolonic hypermetabolism, decreased as compared to prior study, without corresponding abnormalities on CT, likely are related to metformin.\par 4. Nonspecific mildly FDG-avid skin thickening and subcutaneous stranding, bilateral mid/lower anterior abdominal wall, decreased as compared to prior study. Please correlate clinically.\par 5. Remainder of study demonstrates no evidence of FDG-avid disease.\par \par

## 2023-07-04 NOTE — PHYSICAL EXAM
[Ambulatory and capable of all self care but unable to carry out any work activities] : Status 2- Ambulatory and capable of all self care but unable to carry out any work activities. Up and about more than 50% of waking hours [Normal] : affect appropriate [de-identified] : Elderly woman NAD [de-identified] : No icterus  [de-identified] : MMM O/P Clear  [de-identified] : No LAD  [de-identified] : Clear  [de-identified] : S1 S2 [de-identified] : LE edema resolved [de-identified] : No spine/CVA tenderness [de-identified] : Scattered erythema secondary to scratching. No rash noted

## 2023-07-04 NOTE — HISTORY OF PRESENT ILLNESS
[Disease: _____________________] : Disease: [unfilled] [AJCC Stage: ____] : AJCC Stage: [unfilled] [de-identified] : 82F former smoker with multiple comorbidities was undergoing orthopedic evaluation as an outpatient due to ongoing chronic back pain and gait imbalance.  She ultimately presented to [facility] ED on 7/19/2022.  Brain imaging demonstrated evidence of metastatic disease including a dominant right temporal lobe 3.1 cm enhancing mass with vasogenic edema.  CT scan of her body demonstrated a ANTHONY tumor with subcentimeter pulmonary nodules and a nonspecific adrenal nodule.  Bronchoscopy with biopsy performed on 7/20/2022 demonstrated adenocarcinoma consistent with lung primary; tumor tested PD-L1 negative and was negative for actionable mutations. however, tested TMB-High.  She underwent craniotomy resection of the dominant brain mass on 7/27/2022 with pathology revealing the same findings.  Hospital course was complicated by NSTEMI, Afib with RVR and RML PE.  She was put on medications for seizure prophylaxis.  She was subsequently discharged to acute rehab on 8/10–8/26/2022.  Treated with GK-SRS to brain metastases in Sept 2022. Started 1st line single agent Pembro in Oct 2022; achieved AK.  [de-identified] : – Left mainstem bronchus biopsy 7/20/2022: Poorly differentiated adenocarcinoma of lung origin.\par PD-L1 negative/0%.\par Foundation One:  No actionable mutations (TP53+, among others); TMB-High.  \par –Right brain tumor resection 7/27/2022: Metastatic non-small cell carcinoma, favor adenocarcinoma, consistent with lung origin.\par PD-L1 negative. [de-identified] : Patient started first-line single agent Pembrolizumab in Oct 2022; achieved MS.  \par Patient presents today with her son and caregiver.  She complains of significant pruritus for which she has been using hydrocortisone cream without relief.  Recommended she see dermatology.\par She follows with palliative care and is on treatment with tramadol and medical cannabis for noncancer related back pain.  She is also on gabapentin.  She was referred to interventional pain management and may undergo an epidural procedure.\par \par Restaging brain MRI with sustained intracranial response.\par Restaging PET/CT with sustained systemic response.

## 2023-07-04 NOTE — ASSESSMENT
[FreeTextEntry1] : Metastatic NSCLC with adenocarcinoma histology; tumor tested PDL1 Negative and lacks actionable mutations but tested TMB-High.  Treated with GK-SRS for brain metastases in Sept 2022.  Started first line single agent Pembrolizumab in Oct 2022; achieved TX. \par Restaging Brain MRI June 2023 with sustained intracranial response.  \par Restaging PET/CT June 2023 with sustained response.  \par Recommend:\par – Continue with first-line systemic therapy with single agent Pembrolizumab for as long as it benefits the patient.  Scheduled for treatment later this week. 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  Monitor. \par – Brain Metastases:  F/U with Radiation oncology s/p GK-SRS to brain metastases.  Follows with Neuro-Onc as well. \par –Continue f/u with Palliative Care \par – PE: Continue Eliquis anticoagulation indefinitely given PE and Afib.\par – Continue follow-up and management with her PCP/cardiologist for her other ongoing medical problems and comorbidities.\par -Back pain: Pt with h/o spinal stenosis.  Follows with Pall Care.  Takes gabapentin, tramadol and medical cannabis.  May undergo epidural by interventional pain management for whom she was referred to\par -Iron deficiency:  On PO iron repletion.  Monitor hemoglobin. \par -Pruritus:  request for Derm consultation.  \par -Follow up prior to each cycle.  Plan to obtain her next restaging body scan in OCTOBER if she remains clinically stable

## 2023-07-12 ENCOUNTER — OUTPATIENT (OUTPATIENT)
Dept: OUTPATIENT SERVICES | Facility: HOSPITAL | Age: 83
LOS: 1 days | Discharge: HOME | End: 2023-07-12

## 2023-07-12 DIAGNOSIS — I73.9 PERIPHERAL VASCULAR DISEASE, UNSPECIFIED: Chronic | ICD-10-CM

## 2023-07-12 DIAGNOSIS — C34.90 MALIGNANT NEOPLASM OF UNSPECIFIED PART OF UNSPECIFIED BRONCHUS OR LUNG: ICD-10-CM

## 2023-07-12 DIAGNOSIS — I25.10 ATHEROSCLEROTIC HEART DISEASE OF NATIVE CORONARY ARTERY WITHOUT ANGINA PECTORIS: Chronic | ICD-10-CM

## 2023-07-16 ENCOUNTER — INPATIENT (INPATIENT)
Facility: HOSPITAL | Age: 83
LOS: 1 days | Discharge: HOME HEALTH | DRG: 101 | End: 2023-07-18
Attending: PSYCHIATRY & NEUROLOGY | Admitting: PSYCHIATRY & NEUROLOGY
Payer: MEDICARE

## 2023-07-16 VITALS
RESPIRATION RATE: 18 BRPM | DIASTOLIC BLOOD PRESSURE: 73 MMHG | HEART RATE: 93 BPM | TEMPERATURE: 98 F | HEIGHT: 62.5 IN | SYSTOLIC BLOOD PRESSURE: 144 MMHG | OXYGEN SATURATION: 100 %

## 2023-07-16 DIAGNOSIS — R41.0 DISORIENTATION, UNSPECIFIED: ICD-10-CM

## 2023-07-16 DIAGNOSIS — I73.9 PERIPHERAL VASCULAR DISEASE, UNSPECIFIED: Chronic | ICD-10-CM

## 2023-07-16 DIAGNOSIS — I25.10 ATHEROSCLEROTIC HEART DISEASE OF NATIVE CORONARY ARTERY WITHOUT ANGINA PECTORIS: Chronic | ICD-10-CM

## 2023-07-16 LAB
ALBUMIN SERPL ELPH-MCNC: 4.3 G/DL — SIGNIFICANT CHANGE UP (ref 3.3–5)
ALP SERPL-CCNC: 73 U/L — SIGNIFICANT CHANGE UP (ref 40–120)
ALT FLD-CCNC: 16 U/L — SIGNIFICANT CHANGE UP (ref 10–45)
ANION GAP SERPL CALC-SCNC: 15 MMOL/L — SIGNIFICANT CHANGE UP (ref 5–17)
APPEARANCE UR: CLEAR — SIGNIFICANT CHANGE UP
APTT BLD: 29.4 SEC — SIGNIFICANT CHANGE UP (ref 27.5–35.5)
AST SERPL-CCNC: 15 U/L — SIGNIFICANT CHANGE UP (ref 10–40)
BACTERIA # UR AUTO: NEGATIVE — SIGNIFICANT CHANGE UP
BASE EXCESS BLDV CALC-SCNC: -1.7 MMOL/L — SIGNIFICANT CHANGE UP (ref -2–3)
BASE EXCESS BLDV CALC-SCNC: 0.1 MMOL/L — SIGNIFICANT CHANGE UP (ref -2–3)
BASOPHILS # BLD AUTO: 0.05 K/UL — SIGNIFICANT CHANGE UP (ref 0–0.2)
BASOPHILS NFR BLD AUTO: 0.4 % — SIGNIFICANT CHANGE UP (ref 0–2)
BILIRUB SERPL-MCNC: 0.4 MG/DL — SIGNIFICANT CHANGE UP (ref 0.2–1.2)
BILIRUB UR-MCNC: NEGATIVE — SIGNIFICANT CHANGE UP
BLOOD GAS VENOUS - CREATININE: SIGNIFICANT CHANGE UP MG/DL (ref 0.5–1.3)
BUN SERPL-MCNC: 24 MG/DL — HIGH (ref 7–23)
CA-I SERPL-SCNC: 1.15 MMOL/L — SIGNIFICANT CHANGE UP (ref 1.15–1.33)
CA-I SERPL-SCNC: 1.22 MMOL/L — SIGNIFICANT CHANGE UP (ref 1.15–1.33)
CALCIUM SERPL-MCNC: 9.6 MG/DL — SIGNIFICANT CHANGE UP (ref 8.4–10.5)
CHLORIDE BLDV-SCNC: 103 MMOL/L — SIGNIFICANT CHANGE UP (ref 96–108)
CHLORIDE BLDV-SCNC: 105 MMOL/L — SIGNIFICANT CHANGE UP (ref 96–108)
CHLORIDE SERPL-SCNC: 103 MMOL/L — SIGNIFICANT CHANGE UP (ref 96–108)
CO2 BLDV-SCNC: 27 MMOL/L — HIGH (ref 22–26)
CO2 BLDV-SCNC: 30 MMOL/L — HIGH (ref 22–26)
CO2 SERPL-SCNC: 24 MMOL/L — SIGNIFICANT CHANGE UP (ref 22–31)
COLOR SPEC: SIGNIFICANT CHANGE UP
CREAT SERPL-MCNC: 0.92 MG/DL — SIGNIFICANT CHANGE UP (ref 0.5–1.3)
DIFF PNL FLD: NEGATIVE — SIGNIFICANT CHANGE UP
EGFR: 62 ML/MIN/1.73M2 — SIGNIFICANT CHANGE UP
EOSINOPHIL # BLD AUTO: 0.27 K/UL — SIGNIFICANT CHANGE UP (ref 0–0.5)
EOSINOPHIL NFR BLD AUTO: 2.1 % — SIGNIFICANT CHANGE UP (ref 0–6)
EPI CELLS # UR: 1 /HPF — SIGNIFICANT CHANGE UP
GAS PNL BLDV: 138 MMOL/L — SIGNIFICANT CHANGE UP (ref 136–145)
GAS PNL BLDV: 139 MMOL/L — SIGNIFICANT CHANGE UP (ref 136–145)
GAS PNL BLDV: SIGNIFICANT CHANGE UP
GLUCOSE BLDV-MCNC: 239 MG/DL — HIGH (ref 70–99)
GLUCOSE BLDV-MCNC: 268 MG/DL — HIGH (ref 70–99)
GLUCOSE SERPL-MCNC: 265 MG/DL — HIGH (ref 70–99)
GLUCOSE UR QL: ABNORMAL
HCO3 BLDV-SCNC: 26 MMOL/L — SIGNIFICANT CHANGE UP (ref 22–29)
HCO3 BLDV-SCNC: 28 MMOL/L — SIGNIFICANT CHANGE UP (ref 22–29)
HCT VFR BLD CALC: 40.4 % — SIGNIFICANT CHANGE UP (ref 34.5–45)
HCT VFR BLDA CALC: 35 % — SIGNIFICANT CHANGE UP (ref 34.5–46.5)
HCT VFR BLDA CALC: 40 % — SIGNIFICANT CHANGE UP (ref 34.5–46.5)
HGB BLD CALC-MCNC: 11.6 G/DL — LOW (ref 11.7–16.1)
HGB BLD CALC-MCNC: 13.4 G/DL — SIGNIFICANT CHANGE UP (ref 11.7–16.1)
HGB BLD-MCNC: 13.3 G/DL — SIGNIFICANT CHANGE UP (ref 11.5–15.5)
HYALINE CASTS # UR AUTO: 1 /LPF — SIGNIFICANT CHANGE UP (ref 0–2)
IMM GRANULOCYTES NFR BLD AUTO: 0.7 % — SIGNIFICANT CHANGE UP (ref 0–0.9)
INR BLD: 1.22 RATIO — HIGH (ref 0.88–1.16)
KETONES UR-MCNC: NEGATIVE — SIGNIFICANT CHANGE UP
LACTATE BLDV-MCNC: 2.9 MMOL/L — HIGH (ref 0.5–2)
LACTATE BLDV-MCNC: 3.4 MMOL/L — HIGH (ref 0.5–2)
LEUKOCYTE ESTERASE UR-ACNC: ABNORMAL
LYMPHOCYTES # BLD AUTO: 2.94 K/UL — SIGNIFICANT CHANGE UP (ref 1–3.3)
LYMPHOCYTES # BLD AUTO: 23.1 % — SIGNIFICANT CHANGE UP (ref 13–44)
MCHC RBC-ENTMCNC: 32.2 PG — SIGNIFICANT CHANGE UP (ref 27–34)
MCHC RBC-ENTMCNC: 32.9 GM/DL — SIGNIFICANT CHANGE UP (ref 32–36)
MCV RBC AUTO: 97.8 FL — SIGNIFICANT CHANGE UP (ref 80–100)
MONOCYTES # BLD AUTO: 0.66 K/UL — SIGNIFICANT CHANGE UP (ref 0–0.9)
MONOCYTES NFR BLD AUTO: 5.2 % — SIGNIFICANT CHANGE UP (ref 2–14)
NEUTROPHILS # BLD AUTO: 8.69 K/UL — HIGH (ref 1.8–7.4)
NEUTROPHILS NFR BLD AUTO: 68.5 % — SIGNIFICANT CHANGE UP (ref 43–77)
NITRITE UR-MCNC: NEGATIVE — SIGNIFICANT CHANGE UP
NRBC # BLD: 0 /100 WBCS — SIGNIFICANT CHANGE UP (ref 0–0)
PCO2 BLDV: 53 MMHG — HIGH (ref 39–42)
PCO2 BLDV: 58 MMHG — HIGH (ref 39–42)
PH BLDV: 7.29 — LOW (ref 7.32–7.43)
PH BLDV: 7.29 — LOW (ref 7.32–7.43)
PH UR: 6 — SIGNIFICANT CHANGE UP (ref 5–8)
PLATELET # BLD AUTO: 266 K/UL — SIGNIFICANT CHANGE UP (ref 150–400)
PO2 BLDV: 23 MMHG — LOW (ref 25–45)
PO2 BLDV: 28 MMHG — SIGNIFICANT CHANGE UP (ref 25–45)
POTASSIUM BLDV-SCNC: 3.8 MMOL/L — SIGNIFICANT CHANGE UP (ref 3.5–5.1)
POTASSIUM BLDV-SCNC: 4.5 MMOL/L — SIGNIFICANT CHANGE UP (ref 3.5–5.1)
POTASSIUM SERPL-MCNC: 4.8 MMOL/L — SIGNIFICANT CHANGE UP (ref 3.5–5.3)
POTASSIUM SERPL-SCNC: 4.8 MMOL/L — SIGNIFICANT CHANGE UP (ref 3.5–5.3)
PROT SERPL-MCNC: 6.7 G/DL — SIGNIFICANT CHANGE UP (ref 6–8.3)
PROT UR-MCNC: ABNORMAL
PROTHROM AB SERPL-ACNC: 14.1 SEC — HIGH (ref 10.5–13.4)
RBC # BLD: 4.13 M/UL — SIGNIFICANT CHANGE UP (ref 3.8–5.2)
RBC # FLD: 12.4 % — SIGNIFICANT CHANGE UP (ref 10.3–14.5)
RBC CASTS # UR COMP ASSIST: 20 /HPF — HIGH (ref 0–4)
SAO2 % BLDV: 18.2 % — LOW (ref 67–88)
SAO2 % BLDV: 35.4 % — LOW (ref 67–88)
SODIUM SERPL-SCNC: 142 MMOL/L — SIGNIFICANT CHANGE UP (ref 135–145)
SP GR SPEC: >1.05 (ref 1.01–1.02)
TROPONIN T, HIGH SENSITIVITY RESULT: 12 NG/L — SIGNIFICANT CHANGE UP (ref 0–51)
UROBILINOGEN FLD QL: NEGATIVE — SIGNIFICANT CHANGE UP
WBC # BLD: 12.7 K/UL — HIGH (ref 3.8–10.5)
WBC # FLD AUTO: 12.7 K/UL — HIGH (ref 3.8–10.5)
WBC UR QL: 11 /HPF — HIGH (ref 0–5)

## 2023-07-16 PROCEDURE — 0042T: CPT | Mod: MA

## 2023-07-16 PROCEDURE — 99285 EMERGENCY DEPT VISIT HI MDM: CPT

## 2023-07-16 PROCEDURE — 70496 CT ANGIOGRAPHY HEAD: CPT | Mod: 26,MA

## 2023-07-16 PROCEDURE — 70498 CT ANGIOGRAPHY NECK: CPT | Mod: 26,MA

## 2023-07-16 RX ORDER — ATORVASTATIN CALCIUM 80 MG/1
40 TABLET, FILM COATED ORAL AT BEDTIME
Refills: 0 | Status: DISCONTINUED | OUTPATIENT
Start: 2023-07-16 | End: 2023-07-18

## 2023-07-16 RX ORDER — SODIUM CHLORIDE 9 MG/ML
1000 INJECTION INTRAMUSCULAR; INTRAVENOUS; SUBCUTANEOUS ONCE
Refills: 0 | Status: COMPLETED | OUTPATIENT
Start: 2023-07-16 | End: 2023-07-16

## 2023-07-16 RX ORDER — MIDAZOLAM HYDROCHLORIDE 1 MG/ML
5 INJECTION, SOLUTION INTRAMUSCULAR; INTRAVENOUS ONCE
Refills: 0 | Status: DISCONTINUED | OUTPATIENT
Start: 2023-07-16 | End: 2023-07-18

## 2023-07-16 RX ORDER — APIXABAN 2.5 MG/1
2.5 TABLET, FILM COATED ORAL
Refills: 0 | Status: DISCONTINUED | OUTPATIENT
Start: 2023-07-17 | End: 2023-07-18

## 2023-07-16 RX ORDER — CEFTRIAXONE 500 MG/1
1000 INJECTION, POWDER, FOR SOLUTION INTRAMUSCULAR; INTRAVENOUS EVERY 24 HOURS
Refills: 0 | Status: DISCONTINUED | OUTPATIENT
Start: 2023-07-16 | End: 2023-07-18

## 2023-07-16 RX ORDER — LEVETIRACETAM 250 MG/1
750 TABLET, FILM COATED ORAL
Refills: 0 | Status: DISCONTINUED | OUTPATIENT
Start: 2023-07-16 | End: 2023-07-18

## 2023-07-16 RX ADMIN — ATORVASTATIN CALCIUM 40 MILLIGRAM(S): 80 TABLET, FILM COATED ORAL at 23:25

## 2023-07-16 RX ADMIN — SODIUM CHLORIDE 1000 MILLILITER(S): 9 INJECTION INTRAMUSCULAR; INTRAVENOUS; SUBCUTANEOUS at 21:09

## 2023-07-16 RX ADMIN — LEVETIRACETAM 750 MILLIGRAM(S): 250 TABLET, FILM COATED ORAL at 23:25

## 2023-07-16 NOTE — ED PROVIDER NOTE - CLINICAL SUMMARY MEDICAL DECISION MAKING FREE TEXT BOX
HTN, DM, HLD, CAD, MI, PAD, peripheral neuropathy, former smoker, NSCLC with ANTHONY primary tumor and brain metastasis s/p right craniotomy & resection on 7/27/22 and post-op RT on active treatment, recent STEMI (10/2022), afib on eliquis 2.5mg, hx of RML PE presenting from home with confusion at 7pm. Code stroke called. Son and daughter at bedside. Vitals wnl. Will obtain stroke labs and obtain imaging per stroke protocol. Dispo pending labs, imaging and neurology recommendations. HTN, DM, HLD, CAD, MI, PAD, peripheral neuropathy, former smoker, NSCLC with ANTHONY primary tumor and brain metastasis s/p right craniotomy & resection on 7/27/22 and post-op RT on active treatment, recent STEMI (10/2022), afib on eliquis 2.5mg, hx of RML PE presenting from home with confusion at 7pm. Code stroke called. Son and daughter at bedside. Vitals wnl. Will obtain stroke labs and obtain imaging per stroke protocol. Dispo pending labs, imaging and neurology recommendations.  Attending Mago Martin: 83-year-old female with multiple medical issues including history of high blood pressure, diabetes, hyperlipidemia, coronary artery disease, prior myocardial infarction with diagnosis of lung cancer with brain mets status post right craniotomy with resection in July 2022 presenting with concern for slurred speech and not acting right.  Per family last saw her normal at approximately 3 3:30 PM today.  Received a phone call at approximately 7 PM saying that she was confused and not acting like her self.  They reported that she was having some slurred speech.  No reports of any falls or trauma.  Code stroke was called upon arrival and patient taken to CT scan.  Patient is not a tenecteplase candidate as had prior brain surgery and is on Eliquis.  Afebrile orally.  Nonfocal neurologic exam with equal sensation and strength.  Patient does have some slurred speech on exam.  Abdomen soft and nontender.  No known history of any seizure disorder.  Will obtain labs, CT scans of the head and neck, check urine analysis and discussed with neurology.

## 2023-07-16 NOTE — ED PROVIDER NOTE - ATTENDING WITH...
Problem: Knowledge deficit - Parent/Caregiver  Goal: Family verbalizes understanding of infant's condition  Intervention: Inform parents of plan of care  Parents of infant updated on plan of care at bedside.  All questions answered at this time.  Reinforcement needed.    Problem: Infection  Goal: Prevention of Infection  Intervention: Clean/Disinfect all high touch surfaces every shift  Bedside and all high touch surfaces disinfected with germicidal wipes at beginning of shift and as needed.    Problem: Oxygenation/Respiratory Function  Goal: Optimized air exchange  Infant remains on 5 L of O2 via high flow nasal cannula, FiO2 34-37%.  Infant turned and repositioned every 3 hours and as needed to aid in optimal air exchange.    Problem: Fluid and Electrolyte imbalance  Goal: Promotion of Fluid Balance  D10% Vanilla to infuse via PICC at 1 mL/hr.    Problem: Nutrition/Feeding  Goal: Tolerating transition to enteral feedings  Intervention: Gavage feeding per feeding tube guidelines. Offer pacifier wtih gavage feeds.  Infant receiving mothers breast milk 20 calorie.  55 mL gavaged every 3 hours.  Infant tolerating feeds, no emesis.         Resident

## 2023-07-16 NOTE — CONSULT NOTE ADULT - ASSESSMENT
Assessment: 83y (1940) woman with a PMHx significant for HTN, DM, HLD, CAD, MI, PAD, peripheral neuropathy, former smoker, NSCLC with ANTHONY primary tumor and brain metastasis s/p right craniotomy & resection on 7/27/22 and post-op RT on active treatment, recent STEMI (10/2022), afib on eliquis 2.5mg, hx of RML PE presenting from home with confusion. Son and daughter in law at bedside. They visited her this afternoon and she was in her normal state of health, LKW 3:30pm. Patient has a live in home aid who is on vacation this week. Aid helps with showering and administering medication. Unknown if and when she took her last eliquis. She ambulates with a walker. Around 7pm she called her son and the daughter in law picked up the phone. She sounded confusion and slight slurred speech. Patient told them to come over because something was "wrong with my head". Patient has no complaints. She is AAOx3. Denies weakness, numbness, vision changes, trouble swallowing. Patient reports she was losing periods of time through her day. She feels like she lost her mind. Patient thinks she would loose about 20 minutes at a time. She says she felt an abnormal feeling/shaking inside? Denies shaking of extremities. Ct head unremarkable for acute findings. Patient is on keppra 750mg bid. family reports compliance. WBC 12.7, glucose 265.    LKW 3:30pm 7/16  NIHSS 1  mRS 2    Impression: confusion with loss of periods of time 2/2 unknown etiology. may be nonconvulsive seizures with post-ictal periods of confusion vs toxic metabolic etiology vs less likely acute ischemic infarct    Plan  [] continue home eliquis 2.5mg bid & keppra 750mg bid  [x] cbc. cmp, INR, pt/ptt  [] follow up CTA report  [] 2hr routine EEG  [] MRI brain without contrast  [] keppra level  [] UA    Case discussed with Stroke fellow    Assessment: 83y (1940) woman with a PMHx significant for HTN, DM, HLD, CAD, MI, PAD, peripheral neuropathy, former smoker, NSCLC with ANTHONY primary tumor and brain metastasis s/p right craniotomy & resection on 7/27/22 and post-op RT on active treatment, recent STEMI (10/2022), afib on eliquis 2.5mg, hx of RML PE presenting from home with confusion. Son and daughter in law at bedside. They visited her this afternoon and she was in her normal state of health, LKW 3:30pm. Patient has a live in home aid who is on vacation this week. Aid helps with showering and administering medication. Unknown if and when she took her last eliquis. She ambulates with a walker. Around 7pm she called her son and the daughter in law picked up the phone. She sounded confusion and slight slurred speech. Patient told them to come over because something was "wrong with my head". Patient has no complaints. She is AAOx3. Denies weakness, numbness, vision changes, trouble swallowing. Patient reports she was losing periods of time through her day. She feels like she lost her mind. Patient thinks she would loose about 20 minutes at a time. She says she felt an abnormal feeling/shaking inside? Denies shaking of extremities. Ct head unremarkable for acute findings. Patient is on keppra 750mg bid. family reports compliance. WBC 12.7, glucose 265. Not a tenecteplace candidate due to not in the window. Not MT candidate due to no LVO.     LKW 3:30pm 7/16  NIHSS 1  mRS 2    Impression: confusion with loss of periods of time 2/2 unknown etiology. may be nonconvulsive seizures with post-ictal periods of confusion vs toxic metabolic etiology vs acute ischemic infarct    Plan  [] continue home eliquis 2.5mg bid & keppra 750mg bid  [x] cbc. cmp, INR, pt/ptt  [] follow up CTA report  [] 2hr routine EEG  [] MRI brain without contrast  [] keppra level  [] UA    Case discussed with Stroke fellow

## 2023-07-16 NOTE — H&P ADULT - HISTORY OF PRESENT ILLNESS
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 Son and daughter in law at bedside. They visited her this afternoon and she was in her normal state of health, LKW 3:30pm. Patient has a live in home aid who is on vacation this week. Aid helps with showering and administering medication. Unknown if and when she took her last eliquis. She ambulates with a walker. Around 7pm she called her son and the daughter in law picked up the phone. She sounded confusion and slight slurred speech. Patient told them to come over because something was "wrong with my head". Patient has no complaints. She is AAOx3. Denies weakness, numbness, vision changes, trouble swallowing. Patient reports she was losing periods of time through her day. She feels like she lost her mind. Patient thinks she would loose about 20 minutes at a time. She says she felt an abnormal feeling/shaking inside? Denies shaking of extremities.     LKW 3:30pm 7/16  NIHSS 1  mRS 2

## 2023-07-16 NOTE — ED PROVIDER NOTE - CADM POA URETHRAL CATHETER
PRN Ativan was successful in in decreasing pt's anxiety  Pt's anxiety is now a 0/10  Will continue to monitor and assess  No

## 2023-07-16 NOTE — ED PROVIDER NOTE - NSICDXPASTMEDICALHX_GEN_ALL_CORE_FT
PAST MEDICAL HISTORY:  Chronic back pain     Coronary artery disease     Diabetes     Former smoker     Hyperlipidemia     Hypertension     Myocardial infarct     Non-small cell lung cancer     Peripheral artery disease     Peripheral neuropathy

## 2023-07-16 NOTE — H&P ADULT - NSHPPHYSICALEXAM_GEN_ALL_CORE
Vitals:  T(C): 36.7 (07-16-23 @ 19:57), Max: 36.7 (07-16-23 @ 19:57)  HR: 93 (07-16-23 @ 19:57) (93 - 93)  BP: 144/73 (07-16-23 @ 19:57) (144/73 - 144/73)  RR: 18 (07-16-23 @ 19:57) (18 - 18)  SpO2: 100% (07-16-23 @ 19:57) (100% - 100%)    Physical Examination:   General - NAD, feels confused    Neurologic Exam:  Mental status - Awake, Alert, Oriented to person, place, and time. Speech fluent, repetition and naming intact. Follows simple and complex commands. Cannot remember the name of the current president which is abnormal for her.    Cranial nerves - PERRLA, VFF, EOMI, face sensation (V1-V3) intact b/l, facial strength intact without asymmetry b/l, hearing intact b/l, palate with symmetric elevation, trapezius 5/5 strength b/l, tongue midline on protrusion with full lateral movement    Motor - Normal bulk and tone throughout. No pronator drift.  Strength testing            Deltoid      Biceps      Triceps        R            5                 5               5                     5                             L             5                 5               5                     5                                    Hip Flexion     Knee Flexion    Knee Extension    Dorsiflexion    Plantar Flexion  R              5                           5                       5                           5                            5                  L              5                           5                        5                           5                            5           Sensation - Light touch intact throughout    DTR's - unable to elicit due to would no relax               Toes/plantar response              mute                      mute    Coordination - Finger to Nose intact b/l. LUE slight tremor     Gait and station - Normal casual gait.

## 2023-07-16 NOTE — STROKE CODE NOTE - NSRESATTESTSELECT_ED_ALL_CORE
Stroke Fellow Tremfya Counseling: I discussed with the patient the risks of guselkumab including but not limited to immunosuppression, serious infections, worsening of inflammatory bowel disease and drug reactions.  The patient understands that monitoring is required including a PPD at baseline and must alert us or the primary physician if symptoms of infection or other concerning signs are noted.

## 2023-07-16 NOTE — ED PROVIDER NOTE - PHYSICAL EXAMINATION
General: NAD  HEENT: NCAT.  Cardiac: RRR, 2+ radial pulses  Chest: CTA  Abdomen: soft, non-distended, no ttp, no rebound or guarding  Extremities: no peripheral edema, calf tenderness, or leg size discrepancies  Skin: no rashes  Neuro: AAOx3, motor and sensory grossly intact. CN II-XII intact. 5/5 strength upper and lower extremities. Normal sensation bilaterally upper and lower extremities.   Psych: mood and affect appropriate General: NAD  HEENT: NCAT.  Cardiac: RRR, 2+ radial pulses  Chest: CTA  Abdomen: soft, non-distended, no ttp, no rebound or guarding  Extremities: no peripheral edema, calf tenderness, or leg size discrepancies  Skin: no rashes  Neuro: AAOx3, motor and sensory grossly intact. CN II-XII intact. 5/5 strength upper and lower extremities. Normal sensation bilaterally upper and lower extremities.   Psych: mood and affect appropriate  Attending Mago Martin: Gen: NAD, heent: atrauamtic, eomi, mmm, op pink, neck; nttp, no nuchal rigidity, chest: nttp, no crepitus, cv: rrr,  lungs: ctab, abd: soft, nontender, nondistended, no peritoneal signs, no guarding, ext: wwp, neg homans, skin: no rash, neuro: awake and alert, following commands,  sensation and strength intact, no focal deficits slurred speech, no protonator drift

## 2023-07-16 NOTE — H&P ADULT - ASSESSMENT
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 Son and daughter in law at bedside. They visited her this afternoon and she was in her normal state of health, LKW 3:30pm. Patient has a live in home aid who is on vacation this week. Aid helps with showering and administering medication. Unknown if and when she took her last eliquis. She ambulates with a walker. Around 7pm she called her son and the daughter in law picked up the phone. She sounded confusion and slight slurred speech. Patient told them to come over because something was "wrong with my head". Patient has no complaints. She is AAOx3. Denies weakness, numbness, vision changes, trouble swallowing. Patient reports she was losing periods of time through her day. She feels like she lost her mind. Patient thinks she would loose about 20 minutes at a time. She says she felt an abnormal feeling/shaking inside? Denies shaking of extremities. Ct head unremarkable for acute findings. Patient is on keppra 750mg bid. family reports compliance. WBC 12.7, glucose 265. Not a tenecteplace candidate due to not in the window. Not MT candidate due to no LVO.     LKW 3:30pm 7/16  NIHSS 1  mRS 2    Impression: confusion with loss of periods of time 2/2 unknown etiology. may be nonconvulsive seizures with post-ictal periods of confusion vs toxic metabolic etiology vs acute ischemic infarct    Plan  [] continue home eliquis 2.5mg bid & keppra 750mg bid  [x] cbc. cmp, INR, pt/ptt  [] follow up CTA report  [] 2hr routine EEG  [] MRI brain without contrast  [] keppra level  [] UA    Case discussed with Stroke fellow, neurology attending Dr. Dumont and Dr. Corrales

## 2023-07-16 NOTE — CONSULT NOTE ADULT - SUBJECTIVE AND OBJECTIVE BOX
Neurology - Consult Note    -  Spectra: [phone] ([facility]), [phone] ([facility])  -  HPI: Patient LANIE BERTRAND is a 83y (1940) woman with a PMHx significant for HTN, DM, HLD, CAD, MI, PAD, peripheral neuropathy, former smoker, NSCLC with ANTHONY primary tumor and brain metastasis s/p right craniotomy & resection on 7/27/22 and post-op RT on active treatment, recent STEMI (10/2022), afib on eliquis 2.5mg, hx of RML PE presenting from home with confusion. Son and daughter in law at bedside. They visited her this afternoon and she was in her normal state of health, LKW 3:30pm. Patient has a live in home aid who is on vacation this week. Aid helps with showering and administering medication. Unknown if and when she took her last eliquis. She ambulates with a walker. Around 7pm she called her son and the daughter in law picked up the phone. She sounded confusion and slight slurred speech. Patient told them to come over because something was "wrong with my head". Patient has no complaints. She is AAOx3. Denies weakness, numbness, vision changes, trouble swallowing.       LKW 3:30pm 7/16  NIHSS 1  mRS 2    Review of Systems: refer to HPI    Allergies:  No Known Drug Allergies  adhesives (Pruritus)    PMHx/PSHx/Family Hx: As above, otherwise see below   Hypertension  Coronary artery disease  Hyperlipidemia  Peripheral artery disease  Myocardial infarct  Peripheral neuropathy  Diabetes  Chronic back pain  Former smoker  Non-small cell lung cancer    Social Hx:  No current use of tobacco, alcohol, or illicit drugs  Lives with ***    Medications:  MEDICATIONS  (STANDING):    MEDICATIONS  (PRN):      Vitals:  T(C): 36.7 (07-16-23 @ 19:57), Max: 36.7 (07-16-23 @ 19:57)  HR: 93 (07-16-23 @ 19:57) (93 - 93)  BP: 144/73 (07-16-23 @ 19:57) (144/73 - 144/73)  RR: 18 (07-16-23 @ 19:57) (18 - 18)  SpO2: 100% (07-16-23 @ 19:57) (100% - 100%)    Physical Examination: INCOMPLETE  General - NAD  Cardiovascular - Peripheral pulses palpable, no edema  Eyes - Fundoscopy with flat, sharp optic discs and no hemorrhage or exudates; Fundoscopy not well visualized; Fundoscopy not performed due to safety precautions in the setting of the COVID-19 pandemic    Neurologic Exam:  Mental status - Awake, Alert, Oriented to person, place, and time. Speech fluent, repetition and naming intact. Follows simple and complex commands. Attention/concentration, recent and remote memory (including registration and recall), and fund of knowledge intact    Cranial nerves - PERRLA, VFF, EOMI, face sensation (V1-V3) intact b/l, facial strength intact without asymmetry b/l, hearing intact b/l, palate with symmetric elevation, trapezius OR sternocleidomastiod 5/5 strength b/l, tongue midline on protrusion with full lateral movement    Motor - Normal bulk and tone throughout. No pronator drift.  Strength testing            Deltoid      Biceps      Triceps     Wrist Extension    Wrist Flexion     Interossei         R            5                 5               5                     5                              5                        5                 5  L             5                 5               5                     5                              5                        5                 5              Hip Flexion    Hip Extension    Knee Flexion    Knee Extension    Dorsiflexion    Plantar Flexion  R              5                           5                       5                           5                            5                          5  L              5                           5                        5                           5                            5                          5    Sensation - Light touch/temperature OR pain/vibration intact throughout    DTR's -             Biceps      Triceps     Brachioradialis      Patellar    Ankle    Toes/plantar response  R             2+             2+                  2+                       2+            2+                 Down  L              2+             2+                 2+                        2+           2+                 Down    Coordination - Finger to Nose intact b/l. No tremors appreciated    Gait and station - Normal casual gait. Romberg (-)    Labs:                        13.3   12.70 )-----------( 266      ( 16 Jul 2023 20:16 )             40.4           CAPILLARY BLOOD GLUCOSE      POCT Blood Glucose.: 202 mg/dL (16 Jul 2023 20:01)                      Radiology:     Neurology - Consult Note    -  Spectra: [phone] ([facility]), [phone] ([facility])  -  HPI: Patient LANIE BERTRAND is a 83y (1940) woman with a PMHx significant for HTN, DM, HLD, CAD, MI, PAD, peripheral neuropathy, former smoker, NSCLC with ANTHONY primary tumor and brain metastasis s/p right craniotomy & resection on 7/27/22 and post-op RT on active treatment, recent STEMI (10/2022), afib on eliquis 2.5mg, hx of RML PE presenting from home with confusion. Son and daughter in law at bedside. They visited her this afternoon and she was in her normal state of health, LKW 3:30pm. Patient has a live in home aid who is on vacation this week. Aid helps with showering and administering medication. Unknown if and when she took her last eliquis. She ambulates with a walker. Around 7pm she called her son and the daughter in law picked up the phone. She sounded confusion and slight slurred speech. Patient told them to come over because something was "wrong with my head". Patient has no complaints. She is AAOx3. Denies weakness, numbness, vision changes, trouble swallowing. Patient reports she was losing periods of time through her day. She feels like she lost her mind. Patient thinks she would loose about 20 minutes at a time. She says she felt an abnormal feeling/shaking inside? Denies shaking of extremities.     LKW 3:30pm 7/16  NIHSS 1  mRS 2    Review of Systems: refer to HPI    Allergies:  No Known Drug Allergies  adhesives (Pruritus)    PMHx/PSHx/Family Hx: As above, otherwise see below   Hypertension  Coronary artery disease  Hyperlipidemia  Peripheral artery disease  Myocardial infarct  Peripheral neuropathy  Diabetes  Chronic back pain  Former smoker  Non-small cell lung cancer    Social Hx:  No current use of tobacco, alcohol, or illicit drugs  Lives with ***    Medications:  MEDICATIONS  (STANDING):    MEDICATIONS  (PRN):      Vitals:  T(C): 36.7 (07-16-23 @ 19:57), Max: 36.7 (07-16-23 @ 19:57)  HR: 93 (07-16-23 @ 19:57) (93 - 93)  BP: 144/73 (07-16-23 @ 19:57) (144/73 - 144/73)  RR: 18 (07-16-23 @ 19:57) (18 - 18)  SpO2: 100% (07-16-23 @ 19:57) (100% - 100%)    Physical Examination:   General - NAD, feels confused    Neurologic Exam:  Mental status - Awake, Alert, Oriented to person, place, and time. Speech fluent, repetition and naming intact. Follows simple and complex commands. Cannot remember the name of the current president which is abnormal for her.    Cranial nerves - PERRLA, VFF, EOMI, face sensation (V1-V3) intact b/l, facial strength intact without asymmetry b/l, hearing intact b/l, palate with symmetric elevation, trapezius 5/5 strength b/l, tongue midline on protrusion with full lateral movement    Motor - Normal bulk and tone throughout. No pronator drift.  Strength testing            Deltoid      Biceps      Triceps        R            5                 5               5                     5                             L             5                 5               5                     5                                    Hip Flexion     Knee Flexion    Knee Extension    Dorsiflexion    Plantar Flexion  R              5                           5                       5                           5                            5                  L              5                           5                        5                           5                            5           Sensation - Light touch intact throughout    DTR's - unable to elicit due to would no relax               Toes/plantar response              mute                      mute    Coordination - Finger to Nose intact b/l. LUE slight tremor     Gait and station - Normal casual gait.    Labs:                        13.3   12.70 )-----------( 266      ( 16 Jul 2023 20:16 )             40.4     Radiology:  Ct head: Stable exam. No acute intracranial hemorrhage, vasogenic edema, hydrocephalus or extra-axial collection. Status post right temporal craniotomy with similar appearing postoperative changes.    MRI brain with and without contrast 6/15/23  Stable right temporal postoperative changes. Stable subcentimeter focus   of enhancement along the medial aspect of the cavity, likely   posttreatment changes. No new or worsening enhancement identified.

## 2023-07-16 NOTE — ED ADULT TRIAGE NOTE - CHIEF COMPLAINT QUOTE
"talking gibberish and altered mental status per son" LKW 1500, around 1900 pt having symptoms of slurred speech and no balance

## 2023-07-16 NOTE — H&P ADULT - ATTENDING COMMENTS
Patient presents with brief episode of strange feeling and interruption of awareness. She is quite clear that this was marked change in her baseline. Event very suspicious for complex partial seizure.  Plan for overnight EEG to see if there are additional subclinical seizures or interictal discharges.

## 2023-07-16 NOTE — ED PROVIDER NOTE - OBJECTIVE STATEMENT
HTN, DM, HLD, CAD, MI, PAD, peripheral neuropathy, former smoker, NSCLC with ANTHONY primary tumor and brain metastasis s/p right craniotomy & resection on 7/27/22 and post-op RT on active treatment, recent STEMI (10/2022), afib on eliquis 2.5mg, hx of RML PE presenting from home with confusion at 7pm. Code stroke called. Son and daughter at bedside. Reports that the son got a call from her mom at 7pm stating that she does not feel right and started feeling "weird feeling" on the L head. Denies any fever/chills, chest pain, shortness of breath, abdominal pain. Patient states she is confused. Denies any numbness/tingling, weakness of her body.

## 2023-07-16 NOTE — STROKE CODE NOTE - NS SC SS NIH ATYP SYMPTOMS_GEN_A_CORE
Let's see him next week and schedule him for a repeat EGD and dilation  I saw him on 6/24/20 and he said that he was doing ok with eating at that time  None

## 2023-07-16 NOTE — ED PROVIDER NOTE - ATTENDING CONTRIBUTION TO CARE
Attending MD Mago Martin:  I personally have seen and examined this patient.  Resident note reviewed and agree on plan of care and except where noted.  See HPI, PE, and MDM for details.

## 2023-07-16 NOTE — H&P ADULT - NSHPLABSRESULTS_GEN_ALL_CORE
Labs:                        13.3   12.70 )-----------( 266      ( 16 Jul 2023 20:16 )             40.4     Radiology:  Ct head: Stable exam. No acute intracranial hemorrhage, vasogenic edema, hydrocephalus or extra-axial collection. Status post right temporal craniotomy with similar appearing postoperative changes.    MRI brain with and without contrast 6/15/23  Stable right temporal postoperative changes. Stable subcentimeter focus   of enhancement along the medial aspect of the cavity, likely   posttreatment changes. No new or worsening enhancement identified.    CT PERFUSION: Perfusion abnormality localized to hypodense region in the temporal lobe, related to postoperative changes rather than true perfusion defect. No evidence of core infarct or ischemic penumbra otherwise.    CT ANGIOGRAPHY NECK: Patent vasculature without occlusion. Changes of prior right carotid endarterectomy. Marked narrowing and luminal irregularity of the proximal right internal carotid artery just beyond the origin with limited assessment of stenosis by NASCET criteria given the overall diminutive caliber to the cervical right internal carotid artery. Moderate stenosis origin left internal carotid artery by NASCET criteria. No critical stenosis or dissection.    CT ANGIOGRAPHY BRAIN: No vessel occlusion, flow-limiting stenosis or aneurysm.

## 2023-07-17 ENCOUNTER — NON-APPOINTMENT (OUTPATIENT)
Age: 83
End: 2023-07-17

## 2023-07-17 LAB
ALBUMIN SERPL ELPH-MCNC: 3.7 G/DL — SIGNIFICANT CHANGE UP (ref 3.3–5)
ALP SERPL-CCNC: 68 U/L — SIGNIFICANT CHANGE UP (ref 40–120)
ALT FLD-CCNC: 11 U/L — SIGNIFICANT CHANGE UP (ref 10–45)
ANION GAP SERPL CALC-SCNC: 14 MMOL/L — SIGNIFICANT CHANGE UP (ref 5–17)
AST SERPL-CCNC: 14 U/L — SIGNIFICANT CHANGE UP (ref 10–40)
BASOPHILS # BLD AUTO: 0.04 K/UL — SIGNIFICANT CHANGE UP (ref 0–0.2)
BASOPHILS NFR BLD AUTO: 0.5 % — SIGNIFICANT CHANGE UP (ref 0–2)
BILIRUB SERPL-MCNC: 0.2 MG/DL — SIGNIFICANT CHANGE UP (ref 0.2–1.2)
BUN SERPL-MCNC: 21 MG/DL — SIGNIFICANT CHANGE UP (ref 7–23)
CALCIUM SERPL-MCNC: 8.8 MG/DL — SIGNIFICANT CHANGE UP (ref 8.4–10.5)
CHLORIDE SERPL-SCNC: 107 MMOL/L — SIGNIFICANT CHANGE UP (ref 96–108)
CO2 SERPL-SCNC: 24 MMOL/L — SIGNIFICANT CHANGE UP (ref 22–31)
CREAT SERPL-MCNC: 0.74 MG/DL — SIGNIFICANT CHANGE UP (ref 0.5–1.3)
EGFR: 80 ML/MIN/1.73M2 — SIGNIFICANT CHANGE UP
EOSINOPHIL # BLD AUTO: 0.26 K/UL — SIGNIFICANT CHANGE UP (ref 0–0.5)
EOSINOPHIL NFR BLD AUTO: 3.1 % — SIGNIFICANT CHANGE UP (ref 0–6)
GLUCOSE SERPL-MCNC: 151 MG/DL — HIGH (ref 70–99)
HCT VFR BLD CALC: 34.8 % — SIGNIFICANT CHANGE UP (ref 34.5–45)
HGB BLD-MCNC: 11.5 G/DL — SIGNIFICANT CHANGE UP (ref 11.5–15.5)
IMM GRANULOCYTES NFR BLD AUTO: 0.5 % — SIGNIFICANT CHANGE UP (ref 0–0.9)
LYMPHOCYTES # BLD AUTO: 2.16 K/UL — SIGNIFICANT CHANGE UP (ref 1–3.3)
LYMPHOCYTES # BLD AUTO: 25.5 % — SIGNIFICANT CHANGE UP (ref 13–44)
MCHC RBC-ENTMCNC: 32 PG — SIGNIFICANT CHANGE UP (ref 27–34)
MCHC RBC-ENTMCNC: 33 GM/DL — SIGNIFICANT CHANGE UP (ref 32–36)
MCV RBC AUTO: 96.9 FL — SIGNIFICANT CHANGE UP (ref 80–100)
MONOCYTES # BLD AUTO: 0.68 K/UL — SIGNIFICANT CHANGE UP (ref 0–0.9)
MONOCYTES NFR BLD AUTO: 8 % — SIGNIFICANT CHANGE UP (ref 2–14)
NEUTROPHILS # BLD AUTO: 5.29 K/UL — SIGNIFICANT CHANGE UP (ref 1.8–7.4)
NEUTROPHILS NFR BLD AUTO: 62.4 % — SIGNIFICANT CHANGE UP (ref 43–77)
NRBC # BLD: 0 /100 WBCS — SIGNIFICANT CHANGE UP (ref 0–0)
PLATELET # BLD AUTO: 214 K/UL — SIGNIFICANT CHANGE UP (ref 150–400)
POTASSIUM SERPL-MCNC: 4 MMOL/L — SIGNIFICANT CHANGE UP (ref 3.5–5.3)
POTASSIUM SERPL-SCNC: 4 MMOL/L — SIGNIFICANT CHANGE UP (ref 3.5–5.3)
PROT SERPL-MCNC: 6 G/DL — SIGNIFICANT CHANGE UP (ref 6–8.3)
RBC # BLD: 3.59 M/UL — LOW (ref 3.8–5.2)
RBC # FLD: 12.4 % — SIGNIFICANT CHANGE UP (ref 10.3–14.5)
SODIUM SERPL-SCNC: 145 MMOL/L — SIGNIFICANT CHANGE UP (ref 135–145)
WBC # BLD: 8.47 K/UL — SIGNIFICANT CHANGE UP (ref 3.8–10.5)
WBC # FLD AUTO: 8.47 K/UL — SIGNIFICANT CHANGE UP (ref 3.8–10.5)

## 2023-07-17 PROCEDURE — 99222 1ST HOSP IP/OBS MODERATE 55: CPT | Mod: GC

## 2023-07-17 PROCEDURE — 70553 MRI BRAIN STEM W/O & W/DYE: CPT | Mod: 26

## 2023-07-17 PROCEDURE — 95720 EEG PHY/QHP EA INCR W/VEEG: CPT

## 2023-07-17 RX ORDER — INSULIN LISPRO 100/ML
VIAL (ML) SUBCUTANEOUS AT BEDTIME
Refills: 0 | Status: DISCONTINUED | OUTPATIENT
Start: 2023-07-17 | End: 2023-07-18

## 2023-07-17 RX ORDER — DEXTROSE 50 % IN WATER 50 %
12.5 SYRINGE (ML) INTRAVENOUS ONCE
Refills: 0 | Status: DISCONTINUED | OUTPATIENT
Start: 2023-07-17 | End: 2023-07-18

## 2023-07-17 RX ORDER — SODIUM CHLORIDE 9 MG/ML
1000 INJECTION, SOLUTION INTRAVENOUS
Refills: 0 | Status: DISCONTINUED | OUTPATIENT
Start: 2023-07-17 | End: 2023-07-18

## 2023-07-17 RX ORDER — DEXTROSE 50 % IN WATER 50 %
25 SYRINGE (ML) INTRAVENOUS ONCE
Refills: 0 | Status: DISCONTINUED | OUTPATIENT
Start: 2023-07-17 | End: 2023-07-18

## 2023-07-17 RX ORDER — DEXTROSE 50 % IN WATER 50 %
15 SYRINGE (ML) INTRAVENOUS ONCE
Refills: 0 | Status: DISCONTINUED | OUTPATIENT
Start: 2023-07-17 | End: 2023-07-18

## 2023-07-17 RX ORDER — FERROUS SULFATE 325(65) MG
325 TABLET ORAL
Refills: 0 | Status: DISCONTINUED | OUTPATIENT
Start: 2023-07-17 | End: 2023-07-18

## 2023-07-17 RX ORDER — METOPROLOL TARTRATE 50 MG
25 TABLET ORAL
Refills: 0 | Status: DISCONTINUED | OUTPATIENT
Start: 2023-07-17 | End: 2023-07-18

## 2023-07-17 RX ORDER — PANTOPRAZOLE SODIUM 20 MG/1
40 TABLET, DELAYED RELEASE ORAL
Refills: 0 | Status: DISCONTINUED | OUTPATIENT
Start: 2023-07-17 | End: 2023-07-18

## 2023-07-17 RX ORDER — GABAPENTIN 400 MG/1
200 CAPSULE ORAL AT BEDTIME
Refills: 0 | Status: DISCONTINUED | OUTPATIENT
Start: 2023-07-17 | End: 2023-07-18

## 2023-07-17 RX ORDER — GLUCAGON INJECTION, SOLUTION 0.5 MG/.1ML
1 INJECTION, SOLUTION SUBCUTANEOUS ONCE
Refills: 0 | Status: DISCONTINUED | OUTPATIENT
Start: 2023-07-17 | End: 2023-07-18

## 2023-07-17 RX ORDER — INSULIN LISPRO 100/ML
VIAL (ML) SUBCUTANEOUS
Refills: 0 | Status: DISCONTINUED | OUTPATIENT
Start: 2023-07-17 | End: 2023-07-18

## 2023-07-17 RX ADMIN — Medication 1: at 13:09

## 2023-07-17 RX ADMIN — Medication 325 MILLIGRAM(S): at 17:23

## 2023-07-17 RX ADMIN — ATORVASTATIN CALCIUM 40 MILLIGRAM(S): 80 TABLET, FILM COATED ORAL at 22:44

## 2023-07-17 RX ADMIN — GABAPENTIN 200 MILLIGRAM(S): 400 CAPSULE ORAL at 22:44

## 2023-07-17 RX ADMIN — Medication 1: at 16:42

## 2023-07-17 RX ADMIN — APIXABAN 2.5 MILLIGRAM(S): 2.5 TABLET, FILM COATED ORAL at 17:23

## 2023-07-17 RX ADMIN — PANTOPRAZOLE SODIUM 40 MILLIGRAM(S): 20 TABLET, DELAYED RELEASE ORAL at 09:40

## 2023-07-17 RX ADMIN — Medication 25 MILLIGRAM(S): at 17:22

## 2023-07-17 RX ADMIN — LEVETIRACETAM 750 MILLIGRAM(S): 250 TABLET, FILM COATED ORAL at 06:12

## 2023-07-17 RX ADMIN — LEVETIRACETAM 750 MILLIGRAM(S): 250 TABLET, FILM COATED ORAL at 17:22

## 2023-07-17 RX ADMIN — APIXABAN 2.5 MILLIGRAM(S): 2.5 TABLET, FILM COATED ORAL at 06:12

## 2023-07-17 RX ADMIN — CEFTRIAXONE 100 MILLIGRAM(S): 500 INJECTION, POWDER, FOR SOLUTION INTRAMUSCULAR; INTRAVENOUS at 00:12

## 2023-07-17 NOTE — CONSULT NOTE ADULT - ASSESSMENT
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    #AMS  -CT brain noted  -Neuro w/u  -F/u mri  -F/u eeg    #Paroxysmal atrial fibrillation.   -Continue apixaban   -Continue metoprolol    #hypertension  -stable    #Hx NSTEMI, CAD, s/p PCI  -EKG NSR 80s, no significant changes  -had recent STEMI in 8/2022 treated medically at the time in setting of recent craniotomy  -No chest pain or sob    #Metast. Lung cancer to brain.   -s/p right craniotomy for resection of brain mass 7/27/22  -Neruo f/u      -BB  -hold arb/ccb to minimize hypotension    #history of PE - dx in AUG 2022   -oral ac to be determine      A/p  83y (1940) woman with a PMHx significant for HTN, DM, HLD, CAD, MI, PAD, peripheral neuropathy, former smoker, NSCLC with ANTHONY primary tumor and brain metastasis s/p right craniotomy & resection on 7/27/22 and post-op RT on active treatment, recent STEMI (10/2022), afib on eliquis 2.5mg, hx of RML PE presenting from home with confusion.    #AMS  -CT brain noted  -Neuro w/u  -Infectious w/u  -F/u mri  -F/u eeg    #Paroxysmal atrial fibrillation.   -Continue apixaban   -Continue metoprolol    #hypertension  -stable  -Cont bb    #Hx NSTEMI, CAD, s/p PCI  -EKG NSR 80s, no significant changes  -had recent STEMI in 8/2022 treated medically at the time in setting of recent craniotomy  -No chest pain or sob  -Check echo  -Cont atorvastatin    #Metast. Lung cancer to brain.   -s/p right craniotomy for resection of brain mass 7/27/22  -Heme/onc f/u    #history of PE - dx in AUG 2022   -Cont ac

## 2023-07-17 NOTE — CONSULT NOTE ADULT - SUBJECTIVE AND OBJECTIVE BOX
CARDIOLOGY CONSULT - Dr. Alcaraz         HPI:  83y (1940) woman with a PMHx significant for HTN, DM, HLD, CAD, MI, PAD, peripheral neuropathy, former smoker, NSCLC with ANTHONY primary tumor and brain metastasis s/p right craniotomy & resection on 7/27/22 and post-op RT on active treatment, recent STEMI (10/2022), afib on eliquis 2.5mg, hx of RML PE presenting from home with confusion. Son and daughter in law at bedside. They visited her this afternoon and she was in her normal state of health, LKW 3:30pm. Patient has a live in home aid who is on vacation this week. Aid helps with showering and administering medication. Unknown if and when she took her last eliquis. She ambulates with a walker. Around 7pm she called her son and the daughter in law picked up the phone. She sounded confusion and slight slurred speech. Patient told them to come over because something was "wrong with my head". Patient has no complaints. She is AAOx3. Denies weakness, numbness, vision changes, trouble swallowing. Patient reports she was losing periods of time through her day. She feels like she lost her mind. Patient thinks she would loose about 20 minutes at a time. She says she felt an abnormal feeling/shaking inside? Denies shaking of extremities.     LKW 3:30pm 7/16  NIHSS 1  mRS 2 (16 Jul 2023 23:01)      Pt examined at bedside, feels well, back to her baseline mental status. Does not recall events from yesterday. Denies chest pain, pressure, palpitations, sob and dizziness.    PAST MEDICAL & SURGICAL HISTORY:  Hypertension      Coronary artery disease      Hyperlipidemia      Peripheral artery disease      Myocardial infarct      Peripheral neuropathy      Diabetes      Chronic back pain      Former smoker      Non-small cell lung cancer      PAD (peripheral artery disease)      CAD (coronary artery disease)  stents              PREVIOUS DIAGNOSTIC TESTING:    [x] Echocardiogram:  < from: TTE with Doppler (w/Cont) (10.03.22 @ 09:51) >  Conclusions:  1. Normal mitral valve. Mild mitral regurgitation.  2. Normal left atrium.  LA volume index = 21 cc/m2.  Endocardial visualization enhanced with intravenous  injection of Ultrasonic Enhancing Agent (Definity).  Moderate segmental left ventricular systolic dysfunction.  LVEF visually is 40-45%. Akinesis of the inferior with  hypokinesis of the inferolateral, septum and anterolateral  hypokinetic. Remaining walls are normal. No left  ventricular thrombus.  4. Mild diastolic dysfunction (Stage I).  5. Normal right atrium.  6. Normal right ventricular size and function.  7. Normal tricuspid valve. No tricuspid regurgitation.RVSP  not calculated due to insufficient Doppler signal.  8. No pericardial effusion seen.  *** Compared with echocardiogram of 8/8/2022, there is a  decrease in LVEF and wall motion abnormalities are new.    < end of copied text >    [ ]  Catheterization:  [ ] Stress Test:  	    MEDICATIONS:  Home Medications:  ferrous gluconate 324 mg (38 mg elemental iron) oral tablet: 1 orally 2 times a day (17 Jul 2023 12:57)  insulin glargine 100 units/mL subcutaneous solution: 12 unit(s) subcutaneous once a day (at bedtime)- Home dose (01 Nov 2022 17:12)      MEDICATIONS  (STANDING):  apixaban 2.5 milliGRAM(s) Oral two times a day  atorvastatin 40 milliGRAM(s) Oral at bedtime  cefTRIAXone   IVPB 1000 milliGRAM(s) IV Intermittent every 24 hours  dextrose 5%. 1000 milliLiter(s) (50 mL/Hr) IV Continuous <Continuous>  dextrose 5%. 1000 milliLiter(s) (100 mL/Hr) IV Continuous <Continuous>  dextrose 50% Injectable 25 Gram(s) IV Push once  dextrose 50% Injectable 12.5 Gram(s) IV Push once  dextrose 50% Injectable 25 Gram(s) IV Push once  ferrous    sulfate 325 milliGRAM(s) Oral two times a day  gabapentin 200 milliGRAM(s) Oral at bedtime  glucagon  Injectable 1 milliGRAM(s) IntraMuscular once  insulin lispro (ADMELOG) corrective regimen sliding scale   SubCutaneous three times a day before meals  insulin lispro (ADMELOG) corrective regimen sliding scale   SubCutaneous at bedtime  levETIRAcetam 750 milliGRAM(s) Oral two times a day  metoprolol tartrate 25 milliGRAM(s) Oral two times a day  pantoprazole    Tablet 40 milliGRAM(s) Oral before breakfast      FAMILY HISTORY:  Family history of stroke (Sibling)        SOCIAL HISTORY:    [x] Non-smoker  [ ] Smoker  [ ] Alcohol    Allergies    No Known Drug Allergies  adhesives (Pruritus)    Intolerances    	    REVIEW OF SYSTEMS:  CONSTITUTIONAL: No fever, weight loss, or fatigue  EYES: No eye pain, visual disturbances, or discharge  ENMT:  No difficulty hearing, tinnitus, vertigo; No sinus or throat pain  NECK: No pain or stiffness  RESPIRATORY: No cough, wheezing, chills or hemoptysis; No Shortness of Breath  CARDIOVASCULAR: No chest pain, palpitations, passing out, dizziness, or leg swelling  GASTROINTESTINAL: No abdominal or epigastric pain. No nausea, vomiting, or hematemesis; No diarrhea or constipation. No melena or hematochezia.  GENITOURINARY: No dysuria, frequency, hematuria, or incontinence  NEUROLOGICAL: No headaches, memory loss, loss of strength, numbness, or tremors  SKIN: No itching, burning, rashes, or lesions   	    [x] All others negative	  [ ] Unable to obtain    PHYSICAL EXAM:  T(C): 36.6 (07-17-23 @ 14:45), Max: 36.9 (07-17-23 @ 00:03)  HR: 70 (07-17-23 @ 14:45) (65 - 93)  BP: 131/74 (07-17-23 @ 14:45) (105/71 - 144/73)  RR: 18 (07-17-23 @ 14:45) (17 - 20)  SpO2: 99% (07-17-23 @ 14:45) (96% - 100%)  Wt(kg): --  I&O's Summary    16 Jul 2023 07:01  -  17 Jul 2023 07:00  --------------------------------------------------------  IN: 360 mL / OUT: 0 mL / NET: 360 mL    17 Jul 2023 07:01  -  17 Jul 2023 15:07  --------------------------------------------------------  IN: 480 mL / OUT: 0 mL / NET: 480 mL        Appearance: Normal	  Psychiatry: A & O x 3, Mood & affect appropriate  HEENT:   Normal oral mucosa, PERRL, EOMI	  Lymphatic: No lymphadenopathy  Cardiovascular: Normal S1 S2,RRR, No JVD, No murmurs  Respiratory: Lungs clear to auscultation b/l  Gastrointestinal:  Soft, Non-tender, + BS	  Skin: No rashes, No ecchymoses, No cyanosis	  Neurologic: Non-focal  Extremities: Normal range of motion, No clubbing, cyanosis or edema  Vascular: Peripheral pulses palpable 2+ bilaterally    TELEMETRY: 	    ECG:  	  RADIOLOGY:  OTHER: 	  	  LABS:	 	    CARDIAC MARKERS:  Troponin T, High Sensitivity Result: 12 ng/L (07-16 @ 20:16)                                  11.5   8.47  )-----------( 214      ( 17 Jul 2023 06:58 )             34.8     07-17    145  |  107  |  21  ----------------------------<  151<H>  4.0   |  24  |  0.74    Ca    8.8      17 Jul 2023 07:01    TPro  6.0  /  Alb  3.7  /  TBili  0.2  /  DBili  x   /  AST  14  /  ALT  11  /  AlkPhos  68  07-17    PT/INR - ( 16 Jul 2023 20:16 )   PT: 14.1 sec;   INR: 1.22 ratio         PTT - ( 16 Jul 2023 20:16 )  PTT:29.4 sec  proBNP:   Lipid Profile:   HgA1c:   TSH:        CARDIOLOGY CONSULT - Dr. Alcaraz       HPI:  83y (1940) woman with a PMHx significant for HTN, DM, HLD, CAD, MI, PAD, peripheral neuropathy, former smoker, NSCLC with ANTHONY primary tumor and brain metastasis s/p right craniotomy & resection on 7/27/22 and post-op RT on active treatment, recent STEMI (10/2022), afib on eliquis 2.5mg, hx of RML PE presenting from home with confusion. Son and daughter in law at bedside. They visited her this afternoon and she was in her normal state of health, LKW 3:30pm. Patient has a live in home aid who is on vacation this week. Aid helps with showering and administering medication. Unknown if and when she took her last eliquis. She ambulates with a walker. Around 7pm she called her son and the daughter in law picked up the phone. She sounded confusion and slight slurred speech. Patient told them to come over because something was "wrong with my head". Patient has no complaints. She is AAOx3. Denies weakness, numbness, vision changes, trouble swallowing. Patient reports she was losing periods of time through her day. She feels like she lost her mind. Patient thinks she would loose about 20 minutes at a time. She says she felt an abnormal feeling/shaking inside? Denies shaking of extremities.     LKW 3:30pm 7/16  NIHSS 1  mRS 2 (16 Jul 2023 23:01)      Pt examined at bedside, feels well, back to her baseline mental status. Does not recall events from yesterday. Denies chest pain, pressure, palpitations, sob and dizziness.    PAST MEDICAL & SURGICAL HISTORY:  Hypertension      Coronary artery disease      Hyperlipidemia      Peripheral artery disease      Myocardial infarct      Peripheral neuropathy      Diabetes      Chronic back pain      Former smoker      Non-small cell lung cancer      PAD (peripheral artery disease)      CAD (coronary artery disease)  stents        PREVIOUS DIAGNOSTIC TESTING:    [x] Echocardiogram:  < from: TTE with Doppler (w/Cont) (10.03.22 @ 09:51) >  Conclusions:  1. Normal mitral valve. Mild mitral regurgitation.  2. Normal left atrium.  LA volume index = 21 cc/m2.  Endocardial visualization enhanced with intravenous  injection of Ultrasonic Enhancing Agent (Definity).  Moderate segmental left ventricular systolic dysfunction.  LVEF visually is 40-45%. Akinesis of the inferior with  hypokinesis of the inferolateral, septum and anterolateral  hypokinetic. Remaining walls are normal. No left  ventricular thrombus.  4. Mild diastolic dysfunction (Stage I).  5. Normal right atrium.  6. Normal right ventricular size and function.  7. Normal tricuspid valve. No tricuspid regurgitation.RVSP  not calculated due to insufficient Doppler signal.  8. No pericardial effusion seen.  *** Compared with echocardiogram of 8/8/2022, there is a  decrease in LVEF and wall motion abnormalities are new.    < end of copied text >    [ ]  Catheterization:  [ ] Stress Test:  	    MEDICATIONS:  Home Medications:  ferrous gluconate 324 mg (38 mg elemental iron) oral tablet: 1 orally 2 times a day (17 Jul 2023 12:57)  insulin glargine 100 units/mL subcutaneous solution: 12 unit(s) subcutaneous once a day (at bedtime)- Home dose (01 Nov 2022 17:12)      MEDICATIONS  (STANDING):  apixaban 2.5 milliGRAM(s) Oral two times a day  atorvastatin 40 milliGRAM(s) Oral at bedtime  cefTRIAXone   IVPB 1000 milliGRAM(s) IV Intermittent every 24 hours  dextrose 5%. 1000 milliLiter(s) (50 mL/Hr) IV Continuous <Continuous>  dextrose 5%. 1000 milliLiter(s) (100 mL/Hr) IV Continuous <Continuous>  dextrose 50% Injectable 25 Gram(s) IV Push once  dextrose 50% Injectable 12.5 Gram(s) IV Push once  dextrose 50% Injectable 25 Gram(s) IV Push once  ferrous    sulfate 325 milliGRAM(s) Oral two times a day  gabapentin 200 milliGRAM(s) Oral at bedtime  glucagon  Injectable 1 milliGRAM(s) IntraMuscular once  insulin lispro (ADMELOG) corrective regimen sliding scale   SubCutaneous three times a day before meals  insulin lispro (ADMELOG) corrective regimen sliding scale   SubCutaneous at bedtime  levETIRAcetam 750 milliGRAM(s) Oral two times a day  metoprolol tartrate 25 milliGRAM(s) Oral two times a day  pantoprazole    Tablet 40 milliGRAM(s) Oral before breakfast      FAMILY HISTORY:  Family history of stroke (Sibling)        SOCIAL HISTORY:    [x] Non-smoker  [ ] Smoker  [ ] Alcohol    Allergies    No Known Drug Allergies  adhesives (Pruritus)    Intolerances    	    REVIEW OF SYSTEMS:  CONSTITUTIONAL: No fever, weight loss, or fatigue  EYES: No eye pain, visual disturbances, or discharge  ENMT:  No difficulty hearing, tinnitus, vertigo; No sinus or throat pain  NECK: No pain or stiffness  RESPIRATORY: No cough, wheezing, chills or hemoptysis; No Shortness of Breath  CARDIOVASCULAR: No chest pain, palpitations, passing out, dizziness, or leg swelling  GASTROINTESTINAL: No abdominal or epigastric pain. No nausea, vomiting, or hematemesis; No diarrhea or constipation. No melena or hematochezia.  GENITOURINARY: No dysuria, frequency, hematuria, or incontinence  NEUROLOGICAL: No headaches, memory loss, loss of strength, numbness, or tremors  SKIN: No itching, burning, rashes, or lesions   	    [x] All others negative	  [ ] Unable to obtain    PHYSICAL EXAM:  T(C): 36.6 (07-17-23 @ 14:45), Max: 36.9 (07-17-23 @ 00:03)  HR: 70 (07-17-23 @ 14:45) (65 - 93)  BP: 131/74 (07-17-23 @ 14:45) (105/71 - 144/73)  RR: 18 (07-17-23 @ 14:45) (17 - 20)  SpO2: 99% (07-17-23 @ 14:45) (96% - 100%)  Wt(kg): --  I&O's Summary    16 Jul 2023 07:01  -  17 Jul 2023 07:00  --------------------------------------------------------  IN: 360 mL / OUT: 0 mL / NET: 360 mL    17 Jul 2023 07:01  -  17 Jul 2023 15:07  --------------------------------------------------------  IN: 480 mL / OUT: 0 mL / NET: 480 mL        Appearance: Normal	  Psychiatry: A & O x 3, Mood & affect appropriate  HEENT:   Normal oral mucosa, PERRL, EOMI	  Lymphatic: No lymphadenopathy  Cardiovascular: Normal S1 S2,RRR, No JVD, No murmurs  Respiratory: Lungs clear to auscultation b/l  Gastrointestinal:  Soft, Non-tender, + BS	  Skin: No rashes, No ecchymoses, No cyanosis	  Neurologic: Non-focal  Extremities: Normal range of motion, No clubbing, cyanosis or edema  Vascular: Peripheral pulses palpable 2+ bilaterally    TELEMETRY: SR 60-70	    ECG:  	  RADIOLOGY:  < from: CT Brain Perfusion Maps Stroke (07.16.23 @ 20:41) >    IMPRESSION:    CT PERFUSION: Perfusion abnormality localized to hypodense region in the   temporal lobe, related to postoperative changes rather than true   perfusion defect. No evidence of core infarct or ischemic penumbra   otherwise.    CT ANGIOGRAPHY NECK: Patent vasculature without occlusion. Changes of   prior right carotid endarterectomy. Marked narrowing and luminal   irregularity of the proximal right internal carotid artery just beyond   the origin with limited assessment of stenosis by NASCET criteria given   the overall diminutive caliber to the cervical right internal carotid   artery. Moderate stenosis origin left internal carotid artery by NASCET   criteria. No critical stenosis or dissection.    CT ANGIOGRAPHY BRAIN: No vessel occlusion, flow-limiting stenosis or   aneurysm.    Preliminary findings were discussed with Dr. Hunt, Neurology at   7/16/2023 9:15PM.    < end of copied text >        OTHER: 	  	  LABS:	 	    CARDIAC MARKERS:  Troponin T, High Sensitivity Result: 12 ng/L (07-16 @ 20:16)                                  11.5   8.47  )-----------( 214      ( 17 Jul 2023 06:58 )             34.8     07-17    145  |  107  |  21  ----------------------------<  151<H>  4.0   |  24  |  0.74    Ca    8.8      17 Jul 2023 07:01    TPro  6.0  /  Alb  3.7  /  TBili  0.2  /  DBili  x   /  AST  14  /  ALT  11  /  AlkPhos  68  07-17    PT/INR - ( 16 Jul 2023 20:16 )   PT: 14.1 sec;   INR: 1.22 ratio         PTT - ( 16 Jul 2023 20:16 )  PTT:29.4 sec  proBNP:   Lipid Profile:   HgA1c:   TSH:

## 2023-07-17 NOTE — CONSULT NOTE ADULT - SUBJECTIVE AND OBJECTIVE BOX
HEMATOLOGY ONCOLOGY CONSULT     Patient is a 83y old  Female who presents with a chief complaint of seizure (16 Jul 2023 23:01)      HPI:  83y (1940) woman with a PMHx significant for HTN, DM, HLD, CAD, MI, PAD, peripheral neuropathy, former smoker, NSCLC with ANTHONY primary tumor and brain metastasis s/p right craniotomy & resection on 7/27/22 and post-op RT on active treatment, recent STEMI (10/2022), afib on eliquis 2.5mg, hx of RML PE presenting from home with confusion. Son and daughter in law at bedside. They visited her this afternoon and she was in her normal state of health, LKW 3:30pm. Patient has a live in home aid who is on vacation this week. Aid helps with showering and administering medication. Unknown if and when she took her last eliquis. She ambulates with a walker. Around 7pm she called her son and the daughter in law picked up the phone. She sounded confusion and slight slurred speech. Patient told them to come over because something was "wrong with my head". Patient has no complaints. She is AAOx3. Denies weakness, numbness, vision changes, trouble swallowing. Patient reports she was losing periods of time through her day. She feels like she lost her mind. Patient thinks she would loose about 20 minutes at a time. She says she felt an abnormal feeling/shaking inside? Denies shaking of extremities.     LKW 3:30pm 7/16  NIHSS 1  mRS 2 (16 Jul 2023 23:01)       ROS:  Negative except for:    PAST MEDICAL & SURGICAL HISTORY:  Hypertension      Coronary artery disease      Hyperlipidemia      Peripheral artery disease      Myocardial infarct      Peripheral neuropathy      Diabetes      Chronic back pain      Former smoker      Non-small cell lung cancer      PAD (peripheral artery disease)      CAD (coronary artery disease)  stents          SOCIAL HISTORY:    FAMILY HISTORY:  Family history of stroke (Sibling)        MEDICATIONS  (STANDING):  apixaban 2.5 milliGRAM(s) Oral two times a day  atorvastatin 40 milliGRAM(s) Oral at bedtime  cefTRIAXone   IVPB 1000 milliGRAM(s) IV Intermittent every 24 hours  dextrose 5%. 1000 milliLiter(s) (50 mL/Hr) IV Continuous <Continuous>  dextrose 5%. 1000 milliLiter(s) (100 mL/Hr) IV Continuous <Continuous>  dextrose 50% Injectable 25 Gram(s) IV Push once  dextrose 50% Injectable 12.5 Gram(s) IV Push once  dextrose 50% Injectable 25 Gram(s) IV Push once  ferrous    sulfate 325 milliGRAM(s) Oral two times a day  gabapentin 200 milliGRAM(s) Oral at bedtime  glucagon  Injectable 1 milliGRAM(s) IntraMuscular once  insulin lispro (ADMELOG) corrective regimen sliding scale   SubCutaneous three times a day before meals  insulin lispro (ADMELOG) corrective regimen sliding scale   SubCutaneous at bedtime  levETIRAcetam 750 milliGRAM(s) Oral two times a day  metoprolol tartrate 25 milliGRAM(s) Oral two times a day  pantoprazole    Tablet 40 milliGRAM(s) Oral before breakfast    MEDICATIONS  (PRN):  artificial  tears Solution 1 Drop(s) Both EYES three times a day PRN Dry Eyes  dextrose Oral Gel 15 Gram(s) Oral once PRN Blood Glucose LESS THAN 70 milliGRAM(s)/deciliter  LORazepam   Injectable 2 milliGRAM(s) IV Push once PRN Seizure Activity  midazolam Injectable 5 milliGRAM(s) IV Push once PRN Seizure Activity      Allergies    No Known Drug Allergies  adhesives (Pruritus)    Intolerances        Vital Signs Last 24 Hrs  T(C): 36.4 (17 Jul 2023 09:38), Max: 36.9 (17 Jul 2023 00:03)  T(F): 97.5 (17 Jul 2023 09:38), Max: 98.4 (17 Jul 2023 00:03)  HR: 65 (17 Jul 2023 09:38) (65 - 93)  BP: 105/71 (17 Jul 2023 09:38) (105/71 - 144/73)  BP(mean): 75 (16 Jul 2023 21:07) (75 - 75)  RR: 18 (17 Jul 2023 09:38) (17 - 20)  SpO2: 99% (17 Jul 2023 09:38) (96% - 100%)    Parameters below as of 17 Jul 2023 04:28  Patient On (Oxygen Delivery Method): room air        PHYSICAL EXAM  General: adult in NAD  HEENT: clear oropharynx, anicteric sclera, pink conjunctiva  Neck: supple  CV: normal S1/S2 with no murmur rubs or gallops  Lungs: positive air movement b/l ant lungs,clear to auscultation, no wheezes, no rales  Abdomen: soft non-tender non-distended, no hepatosplenomegaly  Ext: no clubbing cyanosis or edema  Skin: no rashes and no petechiae  Neuro: alert and oriented X 4, no focal deficits      07-16-23 @ 07:01  -  07-17-23 @ 07:00  --------------------------------------------------------  IN: 360 mL / OUT: 0 mL / NET: 360 mL      LABS:                          11.5   8.47  )-----------( 214      ( 17 Jul 2023 06:58 )             34.8         Mean Cell Volume : 96.9 fl  Mean Cell Hemoglobin : 32.0 pg  Mean Cell Hemoglobin Concentration : 33.0 gm/dL  Auto Neutrophil # : 5.29 K/uL  Auto Lymphocyte # : 2.16 K/uL  Auto Monocyte # : 0.68 K/uL  Auto Eosinophil # : 0.26 K/uL  Auto Basophil # : 0.04 K/uL  Auto Neutrophil % : 62.4 %  Auto Lymphocyte % : 25.5 %  Auto Monocyte % : 8.0 %  Auto Eosinophil % : 3.1 %  Auto Basophil % : 0.5 %      07-17    145  |  107  |  21  ----------------------------<  151<H>  4.0   |  24  |  0.74    Ca    8.8      17 Jul 2023 07:01    TPro  6.0  /  Alb  3.7  /  TBili  0.2  /  DBili  x   /  AST  14  /  ALT  11  /  AlkPhos  68  07-17      PT/INR - ( 16 Jul 2023 20:16 )   PT: 14.1 sec;   INR: 1.22 ratio         PTT - ( 16 Jul 2023 20:16 )  PTT:29.4 sec                BLOOD SMEAR INTERPRETATION:       RADIOLOGY & ADDITIONAL STUDIES:

## 2023-07-17 NOTE — CONSULT NOTE ADULT - TIME BILLING
Patient seen and examined.  Agree with above PA note.  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    #AMS  -Neuro w/u  -Infectious w/u  -F/u mri/eeg    #Paroxysmal atrial fibrillation.   -Continue apixaban   -Continue metoprolol    #hypertension  -stable  -Cont bb    #Hx NSTEMI, CAD, s/p PCI  -stable  -recent STEMI in 8/2022 treated medically at the time in setting of recent craniotomy  -Check echo  -Cont atorvastatin  -consider ASA 81 Qd for hx of pci, mi if no neuro/neuro sx CI    #Metast. Lung cancer to brain.   -s/p right craniotomy for resection of brain mass 7/27/22  -Heme/onc f/u    #history of PE - dx in AUG 2022   -Cont ac

## 2023-07-17 NOTE — CONSULT NOTE ADULT - ASSESSMENT
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 Oncology Consulted for continuity of care.    #NSCLC  - Pt follows Dr. Sudhakar Ramon at [facility]. She was initially diagnosed with Stage IVB metastatic lung cancer (PDL1 negative, TMB - high) s/p craniotomy on 7/2022 and SRS to the temporal cavity and two metastatic lesions currently on single agent Pembrolizumab since October 2022.  - Pt is scheduled for next dose of Pembrolizumab on Thursday 7/20.  - No role for inpatient immunotherapy  - Pt should follow up after discharge with Dr. Ramon.    #AMS  - Appreciate Neuro input and workup  - MRI negative for progression of disease. Pending EEG.    Attending recommendations to follow.    Alison Pro M.D.  Hematology and Medical Oncology Fellow  Pager: [phone]  For weekends and evenings (5 pm - 8 am), please page Heme/Onc fellow on call.

## 2023-07-17 NOTE — ED ADULT NURSE NOTE - OBJECTIVE STATEMENT
83 year old female with a PMH HTN, CAD, seizures on Keppra, NSCLC with ANTHONY primary tumor and brain metastasis s/p right craniotomy & resection on 7/27/22 comes to the ED as CODE STROKE activated 20:02 for AMS and slurred speech. Per family, pt called son around 7pm and was noticed to be confused and what sounded to be slurred speech. Upon arrival, slurred speech noted, a/ox4 with intermittent episodes of confusion, asking repetitive questions. Pt follows commands, speaking coherently and in NAD at this time. Pt denies numbness/tingling/weakness, visual changes, gross neuro function intact at this time. Neurology team seen at patient's bedside, line and labs obtained. Pt denies CP/SOB, f/c, n/v/d, dizziness/lightheadedness, numbness/tingling/weakness, abdominal pain, back pain, hematuria/dysuria at this time.

## 2023-07-18 ENCOUNTER — TRANSCRIPTION ENCOUNTER (OUTPATIENT)
Age: 83
End: 2023-07-18

## 2023-07-18 VITALS
DIASTOLIC BLOOD PRESSURE: 79 MMHG | OXYGEN SATURATION: 98 % | SYSTOLIC BLOOD PRESSURE: 136 MMHG | HEART RATE: 77 BPM | RESPIRATION RATE: 18 BRPM | TEMPERATURE: 98 F

## 2023-07-18 LAB
A1C WITH ESTIMATED AVERAGE GLUCOSE RESULT: 8.1 % — HIGH (ref 4–5.6)
ANION GAP SERPL CALC-SCNC: 13 MMOL/L — SIGNIFICANT CHANGE UP (ref 5–17)
BUN SERPL-MCNC: 16 MG/DL — SIGNIFICANT CHANGE UP (ref 7–23)
CALCIUM SERPL-MCNC: 9.1 MG/DL — SIGNIFICANT CHANGE UP (ref 8.4–10.5)
CHLORIDE SERPL-SCNC: 108 MMOL/L — SIGNIFICANT CHANGE UP (ref 96–108)
CO2 SERPL-SCNC: 21 MMOL/L — LOW (ref 22–31)
CREAT SERPL-MCNC: 0.79 MG/DL — SIGNIFICANT CHANGE UP (ref 0.5–1.3)
EGFR: 74 ML/MIN/1.73M2 — SIGNIFICANT CHANGE UP
ESTIMATED AVERAGE GLUCOSE: 186 MG/DL — HIGH (ref 68–114)
GLUCOSE SERPL-MCNC: 192 MG/DL — HIGH (ref 70–99)
HCT VFR BLD CALC: 41.2 % — SIGNIFICANT CHANGE UP (ref 34.5–45)
HGB BLD-MCNC: 13.1 G/DL — SIGNIFICANT CHANGE UP (ref 11.5–15.5)
MAGNESIUM SERPL-MCNC: 1.2 MG/DL — LOW (ref 1.6–2.6)
MCHC RBC-ENTMCNC: 31.8 GM/DL — LOW (ref 32–36)
MCHC RBC-ENTMCNC: 32.3 PG — SIGNIFICANT CHANGE UP (ref 27–34)
MCV RBC AUTO: 101.7 FL — HIGH (ref 80–100)
NRBC # BLD: 0 /100 WBCS — SIGNIFICANT CHANGE UP (ref 0–0)
PHOSPHATE SERPL-MCNC: 3.1 MG/DL — SIGNIFICANT CHANGE UP (ref 2.5–4.5)
PLATELET # BLD AUTO: 137 K/UL — LOW (ref 150–400)
POTASSIUM SERPL-MCNC: 4.1 MMOL/L — SIGNIFICANT CHANGE UP (ref 3.5–5.3)
POTASSIUM SERPL-SCNC: 4.1 MMOL/L — SIGNIFICANT CHANGE UP (ref 3.5–5.3)
RBC # BLD: 4.05 M/UL — SIGNIFICANT CHANGE UP (ref 3.8–5.2)
RBC # FLD: 12.4 % — SIGNIFICANT CHANGE UP (ref 10.3–14.5)
SODIUM SERPL-SCNC: 142 MMOL/L — SIGNIFICANT CHANGE UP (ref 135–145)
WBC # BLD: 7.4 K/UL — SIGNIFICANT CHANGE UP (ref 3.8–10.5)
WBC # FLD AUTO: 7.4 K/UL — SIGNIFICANT CHANGE UP (ref 3.8–10.5)

## 2023-07-18 PROCEDURE — 85025 COMPLETE CBC W/AUTO DIFF WBC: CPT

## 2023-07-18 PROCEDURE — 82803 BLOOD GASES ANY COMBINATION: CPT

## 2023-07-18 PROCEDURE — 80053 COMPREHEN METABOLIC PANEL: CPT

## 2023-07-18 PROCEDURE — 85018 HEMOGLOBIN: CPT

## 2023-07-18 PROCEDURE — 95700 EEG CONT REC W/VID EEG TECH: CPT

## 2023-07-18 PROCEDURE — 82435 ASSAY OF BLOOD CHLORIDE: CPT

## 2023-07-18 PROCEDURE — 82962 GLUCOSE BLOOD TEST: CPT

## 2023-07-18 PROCEDURE — 82565 ASSAY OF CREATININE: CPT

## 2023-07-18 PROCEDURE — 81001 URINALYSIS AUTO W/SCOPE: CPT

## 2023-07-18 PROCEDURE — 85027 COMPLETE CBC AUTOMATED: CPT

## 2023-07-18 PROCEDURE — 80048 BASIC METABOLIC PNL TOTAL CA: CPT

## 2023-07-18 PROCEDURE — 83735 ASSAY OF MAGNESIUM: CPT

## 2023-07-18 PROCEDURE — 84484 ASSAY OF TROPONIN QUANT: CPT

## 2023-07-18 PROCEDURE — 82330 ASSAY OF CALCIUM: CPT

## 2023-07-18 PROCEDURE — 84295 ASSAY OF SERUM SODIUM: CPT

## 2023-07-18 PROCEDURE — 99285 EMERGENCY DEPT VISIT HI MDM: CPT

## 2023-07-18 PROCEDURE — 80177 DRUG SCRN QUAN LEVETIRACETAM: CPT

## 2023-07-18 PROCEDURE — 99238 HOSP IP/OBS DSCHRG MGMT 30/<: CPT

## 2023-07-18 PROCEDURE — 85730 THROMBOPLASTIN TIME PARTIAL: CPT

## 2023-07-18 PROCEDURE — 70553 MRI BRAIN STEM W/O & W/DYE: CPT

## 2023-07-18 PROCEDURE — 83036 HEMOGLOBIN GLYCOSYLATED A1C: CPT

## 2023-07-18 PROCEDURE — 84132 ASSAY OF SERUM POTASSIUM: CPT

## 2023-07-18 PROCEDURE — 70498 CT ANGIOGRAPHY NECK: CPT | Mod: MA

## 2023-07-18 PROCEDURE — 70450 CT HEAD/BRAIN W/O DYE: CPT | Mod: MA

## 2023-07-18 PROCEDURE — 97165 OT EVAL LOW COMPLEX 30 MIN: CPT

## 2023-07-18 PROCEDURE — 97161 PT EVAL LOW COMPLEX 20 MIN: CPT

## 2023-07-18 PROCEDURE — 82947 ASSAY GLUCOSE BLOOD QUANT: CPT

## 2023-07-18 PROCEDURE — 0042T: CPT | Mod: MA

## 2023-07-18 PROCEDURE — 85610 PROTHROMBIN TIME: CPT

## 2023-07-18 PROCEDURE — 85014 HEMATOCRIT: CPT

## 2023-07-18 PROCEDURE — 84100 ASSAY OF PHOSPHORUS: CPT

## 2023-07-18 PROCEDURE — A9585: CPT

## 2023-07-18 PROCEDURE — 70496 CT ANGIOGRAPHY HEAD: CPT | Mod: MA

## 2023-07-18 PROCEDURE — 36415 COLL VENOUS BLD VENIPUNCTURE: CPT

## 2023-07-18 PROCEDURE — 87186 SC STD MICRODIL/AGAR DIL: CPT

## 2023-07-18 PROCEDURE — 95716 VEEG EA 12-26HR CONT MNTR: CPT

## 2023-07-18 PROCEDURE — 87086 URINE CULTURE/COLONY COUNT: CPT

## 2023-07-18 PROCEDURE — 83605 ASSAY OF LACTIC ACID: CPT

## 2023-07-18 RX ORDER — CEFPODOXIME PROXETIL 100 MG
1 TABLET ORAL
Qty: 2 | Refills: 0
Start: 2023-07-18 | End: 2023-07-18

## 2023-07-18 RX ORDER — CEFPODOXIME PROXETIL 100 MG
1 TABLET ORAL
Qty: 6 | Refills: 0
Start: 2023-07-18 | End: 2023-07-20

## 2023-07-18 RX ORDER — LEVETIRACETAM 250 MG/1
2 TABLET, FILM COATED ORAL
Qty: 120 | Refills: 0
Start: 2023-07-18

## 2023-07-18 RX ADMIN — PANTOPRAZOLE SODIUM 40 MILLIGRAM(S): 20 TABLET, DELAYED RELEASE ORAL at 05:14

## 2023-07-18 RX ADMIN — CEFTRIAXONE 100 MILLIGRAM(S): 500 INJECTION, POWDER, FOR SOLUTION INTRAMUSCULAR; INTRAVENOUS at 00:08

## 2023-07-18 RX ADMIN — Medication 25 MILLIGRAM(S): at 05:15

## 2023-07-18 RX ADMIN — APIXABAN 2.5 MILLIGRAM(S): 2.5 TABLET, FILM COATED ORAL at 05:14

## 2023-07-18 RX ADMIN — Medication 325 MILLIGRAM(S): at 05:14

## 2023-07-18 RX ADMIN — LEVETIRACETAM 750 MILLIGRAM(S): 250 TABLET, FILM COATED ORAL at 05:15

## 2023-07-18 RX ADMIN — Medication 3: at 12:09

## 2023-07-18 RX ADMIN — Medication 2: at 08:10

## 2023-07-18 NOTE — PHYSICAL THERAPY INITIAL EVALUATION ADULT - DISCHARGE PLANNER MADE AWARE
You can access the FollowMyHealth Patient Portal offered by [facility] by registering at the following website: [email or web address]. By joining [employer]’s FollowMyHealth portal, you will also be able to view your health information using other applications (apps) compatible with our system. yes

## 2023-07-18 NOTE — PROGRESS NOTE ADULT - SUBJECTIVE AND OBJECTIVE BOX
Neurology Progress Note    SUBJECTIVE/OBJECTIVE/INTERVAL EVENTS: Patient seen and examined at bedside w/ neuro attending and team.     INTERVAL HISTORY:  No acute events overnight.    This AM, patient says she is ready to leave. Says the only thing wrong with her is her spine. She is hesitant to change her levetiracetam dose but does acknowledge she believes she had a breakthrough seizure. Is eventually agreeable to dose increase.    REVIEW OF SYSTEMS: Few questions of a 10-system ROS was performed and is negative except for those items noted above and/or in the HPI.    VITALS & EXAMINATION:  Vital Signs Last 24 Hrs  T(C): 36.8 (18 Jul 2023 12:59), Max: 36.8 (17 Jul 2023 17:22)  T(F): 98.3 (18 Jul 2023 12:59), Max: 98.3 (17 Jul 2023 20:05)  HR: 77 (18 Jul 2023 12:59) (57 - 77)  BP: 136/79 (18 Jul 2023 12:59) (113/69 - 150/78)  BP(mean): --  RR: 18 (18 Jul 2023 12:59) (18 - 18)  SpO2: 98% (18 Jul 2023 12:59) (95% - 99%)    Parameters below as of 18 Jul 2023 12:59  Patient On (Oxygen Delivery Method): room air        General:  Constitutional: Female, appears stated age, nontoxic, not in distress,    Head: Normocephalic;   Eyes: clear sclera;   Extremities: No cyanosis;   Resp: breathing comfortably     Neurological (>12):  MS: Awake, alert. Attends to examiner.  Language: Speech is clear, fluent  CNs: PERRL (R 3mm, L 3mm). VFF. EOMI. No disconjugate gaze, skew. V1-3 intact LT, No facial asymmetry b/l. Hearing grossly normal b/l. Tongue midline.     Motor - Normal bulk throughout for age.    L/R (out of 5)       Deltoid  5/5    Biceps   5/5      Triceps  5/5         Wrist Extension 5/5   Wrist Flexion  5/5   Interossei 5/5     5/5   L/R (out of 5)       Hip Flexion  5/5    Hip Extension  5/5  Knee Extension  5/5  Dorsiflexion  5/5      Plantar Flexion 5/5     Sensation: Intact to LT b/l.  Reflexes L/R:  Did not test  Toes: mute  Coordination: No dysmetria to FTN b/l UE  Gait: Did not walk due to safety concerns.    LABORATORY:  CBC                       13.1   7.40  )-----------( 137      ( 18 Jul 2023 05:28 )             41.2     Chem 07-18    142  |  108  |  16  ----------------------------<  192<H>  4.1   |  21<L>  |  0.79    Ca    9.1      18 Jul 2023 05:29  Phos  3.1     07-18  Mg     1.2     07-18    TPro  6.0  /  Alb  3.7  /  TBili  0.2  /  DBili  x   /  AST  14  /  ALT  11  /  AlkPhos  68  07-17    LFTs LIVER FUNCTIONS - ( 17 Jul 2023 07:01 )  Alb: 3.7 g/dL / Pro: 6.0 g/dL / ALK PHOS: 68 U/L / ALT: 11 U/L / AST: 14 U/L / GGT: x           Coagulopathy PT/INR - ( 16 Jul 2023 20:16 )   PT: 14.1 sec;   INR: 1.22 ratio         PTT - ( 16 Jul 2023 20:16 )  PTT:29.4 sec  Lipid Panel   A1c   Cardiac enzymes     U/A Urinalysis Basic - ( 18 Jul 2023 05:29 )    Color: x / Appearance: x / SG: x / pH: x  Gluc: 192 mg/dL / Ketone: x  / Bili: x / Urobili: x   Blood: x / Protein: x / Nitrite: x   Leuk Esterase: x / RBC: x / WBC x   Sq Epi: x / Non Sq Epi: x / Bacteria: x      CSF  Immunological  Other    STUDIES & IMAGING: (EEG, CT, MR, U/S, TTE/MARVIN):    MRI brain w/wo IV contrast (7/17/2023):  IMPRESSION:    No significant interval change from 6/15/2023.    Similar-appearing postsurgical encephalomalacia and gliosis in the right mid temporal lobe.    Similar 5 mm focus of enhancement along the mesial aspect of the surgical cavity, likely postsurgical in nature.    No new abnormal parenchymal or leptomeningeal enhancement.    Similar nonspecific T2 and FLAIR hyperintense signal surrounding the surgical cavity, likely representing gliosis.
CARDIOLOGY FOLLOW UP - Dr. Alcaraz  DATE OF SERVICE: 7/18/23    CC  No cv complaints    REVIEW OF SYSTEMS:  CONSTITUTIONAL: No fever, weight loss, or fatigue  RESPIRATORY: No cough, wheezing, chills or hemoptysis; No Shortness of Breath  CARDIOVASCULAR: No chest pain, palpitations, passing out, dizziness, or leg swelling  GASTROINTESTINAL: No abdominal or epigastric pain. No nausea, vomiting, or hematemesis; No diarrhea or constipation. No melena or hematochezia.  VASCULAR: No edema     PHYSICAL EXAM:  T(C): 36.8 (07-18-23 @ 09:13), Max: 36.8 (07-17-23 @ 17:22)  HR: 636 (07-18-23 @ 09:13) (68 - 636)  BP: 130/84 (07-18-23 @ 09:13) (113/69 - 150/78)  RR: 18 (07-18-23 @ 09:13) (18 - 18)  SpO2: 98% (07-18-23 @ 09:13) (95% - 99%)  Wt(kg): --  I&O's Summary    17 Jul 2023 07:01  -  18 Jul 2023 07:00  --------------------------------------------------------  IN: 890 mL / OUT: 2200 mL / NET: -1310 mL    18 Jul 2023 07:01  -  18 Jul 2023 12:09  --------------------------------------------------------  IN: 360 mL / OUT: 0 mL / NET: 360 mL        Appearance: Normal	  Cardiovascular: Normal S1 S2,RRR, No JVD, No murmurs  Respiratory: Lungs clear to auscultation b/l  Gastrointestinal:  Soft, Non-tender, + BS	  Extremities: Normal range of motion, No clubbing, cyanosis or edema      Home Medications:  ferrous gluconate 324 mg (38 mg elemental iron) oral tablet: 1 orally 2 times a day (17 Jul 2023 12:57)  insulin glargine 100 units/mL subcutaneous solution: 12 unit(s) subcutaneous once a day (at bedtime)- Home dose (01 Nov 2022 17:12)      MEDICATIONS  (STANDING):  apixaban 2.5 milliGRAM(s) Oral two times a day  atorvastatin 40 milliGRAM(s) Oral at bedtime  cefTRIAXone   IVPB 1000 milliGRAM(s) IV Intermittent every 24 hours  dextrose 5%. 1000 milliLiter(s) (50 mL/Hr) IV Continuous <Continuous>  dextrose 5%. 1000 milliLiter(s) (100 mL/Hr) IV Continuous <Continuous>  dextrose 50% Injectable 25 Gram(s) IV Push once  dextrose 50% Injectable 12.5 Gram(s) IV Push once  dextrose 50% Injectable 25 Gram(s) IV Push once  ferrous    sulfate 325 milliGRAM(s) Oral two times a day  gabapentin 200 milliGRAM(s) Oral at bedtime  glucagon  Injectable 1 milliGRAM(s) IntraMuscular once  insulin lispro (ADMELOG) corrective regimen sliding scale   SubCutaneous at bedtime  insulin lispro (ADMELOG) corrective regimen sliding scale   SubCutaneous three times a day before meals  levETIRAcetam 750 milliGRAM(s) Oral two times a day  metoprolol tartrate 25 milliGRAM(s) Oral two times a day  pantoprazole    Tablet 40 milliGRAM(s) Oral before breakfast      TELEMETRY: 	    ECG:  	  RADIOLOGY:   < from: MR Head w/wo IV Cont (07.17.23 @ 11:52) >    IMPRESSION:    No significant interval change from 6/15/2023.    Similar-appearing postsurgical encephalomalacia and gliosis in the right   mid temporal lobe.    Similar 5 mm focus of enhancement along the mesial aspect of the surgical   cavity, likely postsurgical in nature.    No new abnormal parenchymal or leptomeningeal enhancement.    Similar nonspecific T2 and FLAIR hyperintense signal surrounding the   surgical cavity, likely representing gliosis.    < end of copied text >    DIAGNOSTIC TESTING:  [ ] Echocardiogram:  [ ]  Catheterization:  [ ] Stress Test:    OTHER: 	    LABS:	 	    Troponin T, High Sensitivity Result: 12 ng/L [0 - 51] (07-16 @ 20:16)                          13.1   7.40  )-----------( 137      ( 18 Jul 2023 05:28 )             41.2     07-18    142  |  108  |  16  ----------------------------<  192<H>  4.1   |  21<L>  |  0.79    Ca    9.1      18 Jul 2023 05:29  Phos  3.1     07-18  Mg     1.2     07-18    TPro  6.0  /  Alb  3.7  /  TBili  0.2  /  DBili  x   /  AST  14  /  ALT  11  /  AlkPhos  68  07-17    PT/INR - ( 16 Jul 2023 20:16 )   PT: 14.1 sec;   INR: 1.22 ratio         PTT - ( 16 Jul 2023 20:16 )  PTT:29.4 sec

## 2023-07-18 NOTE — DISCHARGE NOTE NURSING/CASE MANAGEMENT/SOCIAL WORK - NSDCVIVACCINE_GEN_ALL_CORE_FT
influenza, high-dose, quadrivalent; 05-Oct-2022 15:20; Meera Jha (RN); [employer]; [id number] (Exp. Date: 30-Jun-2023); IntraMuscular; Deltoid Left.; 0.7 milliLiter(s); VIS (VIS Published: 06-Aug-2021, VIS Presented: 05-Oct-2022);

## 2023-07-18 NOTE — PHYSICAL THERAPY INITIAL EVALUATION ADULT - GENERAL OBSERVATIONS, REHAB EVAL
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 CTH, CTA H&N, & MRI head all unremarkable, pt placed on vEEG. Pt received semi-supine in bed in NAD, VSS, +vEEG, +IV, +purewick, A&Ox3-4, at times with decreased insight, however following all commands.

## 2023-07-18 NOTE — DISCHARGE NOTE PROVIDER - NSDCCPCAREPLAN_GEN_ALL_CORE_FT
PRINCIPAL DISCHARGE DIAGNOSIS  Diagnosis: Seizure  Assessment and Plan of Treatment: Increase your Keppra dose to 1000mg twice a day. Please follow up with Dr. Corrales.      SECONDARY DISCHARGE DIAGNOSES  Diagnosis: Urinary tract infection  Assessment and Plan of Treatment: Take Cefpodoxime 100mg twice a day for one day in order to complete your course of antibiotics.

## 2023-07-18 NOTE — DISCHARGE NOTE PROVIDER - NSDCCONDITION_GEN_ALL_CORE
Pt.s spouse called back. She did test positive for COVID. Her main issue is the ST. I instructed pt.s spouse to have her take tylenol as needed for the ST, warm salt water gargles and push fluids. We called and talked to pt.s cardiology office. I spoke with [facility]. With pt.s hx of a stent and a CVA it would not be recommended to stop her Plavix and start Paxlovid. Pt.s main complaint is a ST. She denies having any shortness of breath or chest pain. She has a minimal dry cough. I instructed pt.s spouse if she would develop any worsening sx. to go to the ER. Spouse agreed to plan and verbalized understanding. Stable

## 2023-07-18 NOTE — EEG REPORT - NS EEG TEXT BOX
Patient Name: Ros Phan    Age: 83 year, : 1940  MRN #: - MR#  [id number]  [place]: - [facility] [address]  Referring Physician: -  ER admit        OSH transfer    Study Started: 2023 2:55:41 PM  Study Ended: 2023  11:58 AM      -------------------------------------------------------------------------------------------------------------------------------------------------------  STUDY INFORMATION:    EEG Recording Technique:  The patient underwent continuous Video-EEG monitoring, using Telemetry System hardware on the XLTek Digital System. EEG and video data were stored on a computer hard drive with important events saved in digital archive files. The material was reviewed by a physician (electroencephalographer / epileptologist) on a daily basis. David and seizure detection algorithms were utilized and reviewed. An EEG Technician attended to the patient, and was available throughout daytime work hours.  The epilepsy center neurologist was available in person or on call 24-hours per day.    EEG Placement and Labeling of Electrodes:  The EEG was performed utilizing 20 channel referential EEG connections (coronal over temporal over parasagittal montage) using all standard 10-20 electrode placements with EKG, with additional electrodes placed in the inferior temporal region using the modified 10-10 montage electrode placements for elective admissions, or if deemed necessary. Recording was at a sampling rate of 256 samples per second per channel. Time synchronized digital video recording was done simultaneously with EEG recording. A low light infrared camera was used for low light recording.     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 She also noted that she has been having episodes of time lapse that are about 20 mins at a time.    Home Antiepileptic Medication and Device  Keppra 750mg BID    -------------------------------------------------------------------------------------------------------------------------------------------------------  INTERPRETATION:    DAY 1 	START: 2023  2:55:41 PM     	END: 2023  11:58 AM  	DURATION: 21 HR     DAILY EEG VISUAL ANALYSIS    The background was continuous, asymmetric, spontaneously variable and reactive. During wakefulness, the posterior dominant rhythm consisted of asymmetric, well-modulated 7 Hz activity, with amplitude to 30 uV, that attenuated to eye opening.  Low amplitude frontal beta was noted in wakefulness.   The anterior to posterior gradient was present.     BACKGROUND SLOWING:  No generalized background slowing was present.    FOCAL SLOWING:   Abundant focal polymorphic delta-theta slowing at the right hemisphere was present which was more prominent at the fronto-temporal and temporo-parietal chains.    SLEEP BACKGROUND:  Drowsiness was characterized by fragmentation, attenuation, and slowing of the background activity.    Sleep was characterized by the presence of vertex waves, symmetric sleep spindles and K-complexes.    OTHER NON-EPILEPTIFORM FINDINGS:  Breach effect in right fronto-temporal leads characterized by higher amplitude, sharply contoured waves.    ACTIVATION PROCEDURES:   Hyperventilation was not performed.    Photic stimulation was not performed.    INTERICTAL EPILEPTIFORM ACTIVITY:   None were present.    EVENTS:  No events or seizures recorded.    ARTIFACTS:  Intermittent myogenic and movement artifacts were noted.    ECG:  The heart rate on single channel ECG was predominantly between 60-70 BPM.    ASMs:   Keppra 750mg BID  -------------------------------------------------------------------------------------------------------------------------------------------------------  EEG SUMMARY:  Abnormal EEG in the awake, drowsy and asleep states.  •	Slow posterior dominant rhythm  •	Focal right sided slowing with superimposed breach effect    -------------------------------------------------------------------------------------------------------------------------------------------------------  IMPRESSION/CLINICAL CORRELATE:  This is an abnormal EEG record.   •	Mild generalized cerebral dysfunction  •	Additional focal right hemispheric dysfuction     Patient Name: Ros Phan    Age: 83 year, : 1940  MRN #: - MR#  [id number]  [place]: - [facility] [address]  Referring Physician: -  ER admit        OSH transfer    Study Started: 2023 2:55:41 PM  Study Ended: 2023  11:58 AM      -------------------------------------------------------------------------------------------------------------------------------------------------------  STUDY INFORMATION:    EEG Recording Technique:  The patient underwent continuous Video-EEG monitoring, using Telemetry System hardware on the XLTek Digital System. EEG and video data were stored on a computer hard drive with important events saved in digital archive files. The material was reviewed by a physician (electroencephalographer / epileptologist) on a daily basis. David and seizure detection algorithms were utilized and reviewed. An EEG Technician attended to the patient, and was available throughout daytime work hours.  The epilepsy center neurologist was available in person or on call 24-hours per day.    EEG Placement and Labeling of Electrodes:  The EEG was performed utilizing 20 channel referential EEG connections (coronal over temporal over parasagittal montage) using all standard 10-20 electrode placements with EKG, with additional electrodes placed in the inferior temporal region using the modified 10-10 montage electrode placements for elective admissions, or if deemed necessary. Recording was at a sampling rate of 256 samples per second per channel. Time synchronized digital video recording was done simultaneously with EEG recording. A low light infrared camera was used for low light recording.     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 She also noted that she has been having episodes of time lapse that are about 20 mins at a time.    Home Antiepileptic Medication and Device  Keppra 750mg BID    -------------------------------------------------------------------------------------------------------------------------------------------------------  INTERPRETATION:    DAY 1 	START: 2023  2:55:41 PM     	END: 2023  11:58 AM  	DURATION: 21 HR     DAILY EEG VISUAL ANALYSIS    The background was continuous, asymmetric, spontaneously variable and reactive. During wakefulness, the posterior dominant rhythm consisted of asymmetric, well-modulated 7 Hz activity, with amplitude to 30 uV, that attenuated to eye opening.  Low amplitude frontal beta was noted in wakefulness.   The anterior to posterior gradient was present.     BACKGROUND SLOWING:  No generalized background slowing was present.    FOCAL SLOWING:   Abundant focal polymorphic delta-theta slowing at the right hemisphere was present which was more prominent at the fronto-temporal and temporo-parietal chains.    SLEEP BACKGROUND:  Drowsiness was characterized by fragmentation, attenuation, and slowing of the background activity.    Sleep was characterized by the presence of vertex waves, symmetric sleep spindles and K-complexes.    OTHER NON-EPILEPTIFORM FINDINGS:  Breach effect in right fronto-temporal leads characterized by higher amplitude, sharply contoured waves.    ACTIVATION PROCEDURES:   Hyperventilation was not performed.    Photic stimulation was not performed.    INTERICTAL EPILEPTIFORM ACTIVITY:   None were present.    EVENTS:  No events or seizures recorded.    ARTIFACTS:  Intermittent myogenic and movement artifacts were noted.    ECG:  The heart rate on single channel ECG was predominantly between 60-70 BPM.    ASMs:   Keppra 750mg BID  -------------------------------------------------------------------------------------------------------------------------------------------------------  EEG SUMMARY:  Abnormal EEG in the awake, drowsy and asleep states.  •	Slow posterior dominant rhythm  •	Focal right sided slowing with superimposed breach effect    -------------------------------------------------------------------------------------------------------------------------------------------------------  IMPRESSION/CLINICAL CORRELATE:  This is an abnormal EEG record.   •	Mild generalized cerebral dysfunction  •	Additional focal right hemispheric dysfunction

## 2023-07-18 NOTE — PROGRESS NOTE ADULT - ASSESSMENT
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 Son and daughter in law at bedside. They visited her this afternoon and she was in her normal state of health, LKW 3:30pm. Patient has a live in home aid who is on vacation this week. Aid helps with showering and administering medication. Unknown if and when she took her last eliquis. She ambulates with a walker. Around 7pm she called her son and the daughter in law picked up the phone. She sounded confusion and slight slurred speech. Patient told them to come over because something was "wrong with my head". Patient has no complaints. She is AAOx3. Denies weakness, numbness, vision changes, trouble swallowing. Patient reports she was losing periods of time through her day. She feels like she lost her mind. Patient thinks she would loose about 20 minutes at a time. She says she felt an abnormal feeling/shaking inside? Denies shaking of extremities. Ct head unremarkable for acute findings. Patient is on keppra 750mg bid. family reports compliance. WBC 12.7, glucose 265. Not a tenecteplace candidate due to not in the window. Not MT candidate due to no LVO.     LKW 3:30pm 7/16  NIHSS 1  mRS 2    Impression: confusion with loss of periods of time 2/2 unknown etiology. may be nonconvulsive seizures with post-ictal periods of confusion vs toxic metabolic etiology vs acute ischemic infarct    Plan  [] continue home eliquis 2.5mg bid & keppra 750mg bid  [x] cbc. cmp, INR, pt/ptt  [] follow up CTA report  [] 2hr routine EEG  [] MRI brain without contrast  [] keppra level  [] UA    Case discussed with Stroke fellow, neurology attending Dr. Dumont and Dr. 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 Originally was evaluated as a Code Stroke with NIHSS 1 for dysarthria. Initial stroke workup unremarkable. Admitted to the EMU for further evaluation.    Impression: Suspected breakthrough seizure in the setting of known seizure disorder - on levetiracetam 750mg BID. NOT a stroke.    Plan  [x] MRI brain w/wo IV contrast - demonstrates postsurgical changes  [x] continue home eliquis 2.5mg bid & keppra 750mg bid while inpatient  [x] vEEG complete - no seizures noted  [] keppra level obtained  [] Increase home levetiracetam dose to 1000mg BID and switch to ER formulation - this will be [id number] ER tablets BID  [] Plan for outpatient follow up

## 2023-07-18 NOTE — DISCHARGE NOTE NURSING/CASE MANAGEMENT/SOCIAL WORK - NSDCPEFALRISK_GEN_ALL_CORE
For information on Fall & Injury Prevention, visit: [email or web address] OR  [email or web address] OR  https://www.cdc.gov/steadi/patient.html

## 2023-07-18 NOTE — PROGRESS NOTE ADULT - ATTENDING COMMENTS
Patient presents with brief episode of strange feeling and interruption of awareness. She is quite clear that this was marked change in her baseline. Event very suspicious for complex partial seizure.  Plan for overnight EEG to see if there are additional subclinical seizures or interictal discharges.  Overnight EEG shows only focal slowing continue with known history of prior metastatic lesion.  No epileptiform abnormalities or seizures.    Plan:  will dc home if ambulating safely with change in LEV ER 1000 q12  plan discussed with patient at length, all questions answered.

## 2023-07-18 NOTE — PHYSICAL THERAPY INITIAL EVALUATION ADULT - LIVES WITH, PROFILE
No with her 24/7 HHA (is off on some weekends during the month) lives in coop with 3 steps to enter +1 rail/alone

## 2023-07-18 NOTE — DISCHARGE NOTE PROVIDER - CARE PROVIDER_API CALL
Anthony Corrales  Neurology  [address] [place] [address], [address]  [place] [address]  Phone: [phone]  Fax: [phone]  Follow Up Time: Routine

## 2023-07-18 NOTE — PROGRESS NOTE ADULT - ASSESSMENT
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    #AMS  -CT brain noted  -Neuro w/u  -Infectious w/u  -F/u eeg  -MRI brain noted     #Paroxysmal atrial fibrillation  -Continue apixaban   -Continue metoprolol    #hypertension  -stable  -Cont bb    #Hx NSTEMI, CAD, s/p PCI  -EKG NSR 80s, no significant changes  -had recent STEMI in 8/2022 treated medically at the time in setting of recent craniotomy  -No chest pain or sob  -Cont atorvastatin  -F/u echo    #Metast. Lung cancer to brain.   -s/p right craniotomy for resection of brain mass 7/27/22  -Heme/onc f/u    #history of PE - dx in AUG 2022   -Cont ac

## 2023-07-18 NOTE — PHYSICAL THERAPY INITIAL EVALUATION ADULT - IMPAIRMENTS FOUND, PT EVAL
Internal Medicine Daily Progress Note    Maria E Lopes  78 year old female  [id number]    Date: 10/12/2017     Chief Complaint: Cholelithiasis, hard of hearing, obesity, hypomagnesemia, hypocalcemia, recently diagnosed diabetes mellitus    Subjective: Continues to complain of right upper quadrant abdominal pain and nausea, no vomiting, fever or chills.   Had episode of diarrhea 4 days ago, denies having black or bloody stools, urinary urgency or frequency    Allergies-reviewed    Review of Systems : 10 point review of systems: Pertinent positives and negatives are mentioned in the history of present illness, otherwise negative     Vitals :   Vitals:    10/12/17 1349   BP: 142/62   Pulse: 70   Resp: 18   Temp: 98 °F (36.7 °C)       Physical Examination :  General : Awake, Alert, Oriented to person, place and time. No acute distress. Obese, hard of hearing  HEENT : Head is normocephalic, atraumatic. ALEKSANDER. Oral mucosa moist.  Neck : Supple, No JVD, No LAD.  Lungs : Not using accessory muscles of respiration.Clear to auscultation bilaterally. No wheezes, crackles, or rhonchi.  Heart : Regular rate and rhythm. No murmurs, gallops, or rubs.Dorsalis pedis and posterior tibial intact.  Abdomen : Soft, positive bowel sounds, tender to palpation right upper quadrant Non distended.  Musculoskeletal : No clubbing, cyanosis, or edema in bilateral lower extremities.  Skin : No lesions, rashes, or ulcers.No induration or erythema  Neurological : No focal neurologic deficit,moves all extremities, reflexes intact,strength upper and lower extremities and sensation intact, cranial nerves II through XII grossly intact    Past medical History :   Past Medical History:   Diagnosis Date   • Arthritis    • Diabetes mellitus (CMS/[facility])    • Gastroesophageal reflux disease     rarely   • Knee pain     left   • Left shoulder pain    • Low back pain    • Pneumonia    • Rash    • Rotator cuff tear     right   • Sinusitis, chronic 10/2015     Infection   • Tinnitus     chronic   • Trigger middle finger of left hand 2014   • Unspecified essential hypertension      Past Surgical History:   Procedure Laterality Date   • ARTHROSCOPY SHLD W/ROT CUFF RP Left 10/16/2015    Left shoulder scope; RTC repair; Biceps Tenodesis    • BACK SURGERY      laminectomy   • CRANIOTOMY      Implant and removal of device for tinnitus   • DEXA BONE DENSITY AXIAL SKELETON  09/15/08   • KNEE SCOPE,DIAGNOSTIC  05/19/2010    Knee Arthroscopy   • LUMBAR EPIDURAL INJECTION  2011    x3   • SACROILIAC JOINT INJECTION  2012    Right   • SHOULDER ARTHROSCOPY  3/2/11    Right RCR   • TONSILLECTOMY         Medications : Reviewed.    Laboratory data :      Recent Labs  Lab 10/12/17  1222 10/12/17  0500 10/11/17  1910 10/11/17  0606   WBC  --  7.0  --  8.8   HCT  --  32.4*  --  34.7*   HGB  --  11.1*  --  12.1   PLT  --  204  --  248   SODIUM  --  140  --  138   POTASSIUM 3.8 3.6 3.8 3.7   CHLORIDE  --  107  --  102   CO2  --  26  --  27   CALCIUM  --  11.0*  --  14.2*   GLUCOSE  --  135*  --  141*   BUN  --  21*  --  21*   CREATININE  --  1.17*  --  0.95   AST  --  22  --  24   GPT  --  25  --  31   ALKPT  --  66  --  80   BILIRUBIN  --  0.5  --  0.4   ALBUMIN  --  3.1*  --  3.4*   LIPA  --   --   --  121   GFRNA  --  45  --  57     CT chest - 10/11  IMPRESSION:     1.  No evidence of pulmonary embolism.  2.  Cholelithiasis.  3.  Diffuse diverticulosis without evidence of diverticulitis.  4.  Unchanged low density lesion within the caudate lobe likely  representing focal fat deposition or hepatic cyst  5.  Suspected 1.5 cm pancreatic cyst. No change since 7/28/2017. Management  as below.   RUQ US  IMPRESSION:     1. Abnormal gallbladder. Gallbladder wall calcification versus extensive  cholelithiasis. Gallbladder wall thickness at 5 mm, which could represent  acute cholecystitis in appropriate clinical setting.  2. Diffuse hepatic steatosis.  3. No biliary ductal dilatation.     Assessment  and plan:    1. Right upper quadrant abdominal pain secondary to cholelithiasis  - CT abd/ RUQ US - as above   - Continue with pain control, PPIs  -Zofran p.r.n.  -Surgery consulted for laparoscopic cholecystectomy    2. Diabetes mellitus  - Sliding scale insulin  3. Hypomagnesemia  - Supplement  4. Hypercalcemia  - Check vitamin D, PTH and intact PTH  -Consult endocrinology  5. Hypertension  -Controlled well  6. CK D stage II- avoid nephrotoxins  7 . DVT and GI prophylaxis  8. CODE STATUS-DO NOT RESUSCITATE with maximal medical support    Dr. mcdaniels     aerobic capacity/endurance aerobic capacity/endurance/gait, locomotion, and balance/muscle strength/poor safety awareness

## 2023-07-18 NOTE — OCCUPATIONAL THERAPY INITIAL EVALUATION ADULT - ADDITIONAL COMMENTS
Pt lives alone in a private home +3 steps to enter; one level inside; pt has 24-7 HHA who assists with ADLs. Pt owns a RW.

## 2023-07-18 NOTE — PHYSICAL THERAPY INITIAL EVALUATION ADULT - REFERRING PHYSICIAN, REHAB EVAL
Antoni ESPINOZA A-T Advancement Flap Text: The defect edges were debeveled with a #15 scalpel blade.  Given the location of the defect, shape of the defect and the proximity to free margins an A-T advancement flap was deemed most appropriate.  Using a sterile surgical marker, an appropriate advancement flap was drawn incorporating the defect and placing the expected incisions within the relaxed skin tension lines where possible.    The area thus outlined was incised deep to adipose tissue with a #15 scalpel blade.  The skin margins were undermined to an appropriate distance in all directions utilizing iris scissors.

## 2023-07-18 NOTE — DISCHARGE NOTE PROVIDER - NSDCMRMEDTOKEN_GEN_ALL_CORE_FT
apixaban 2.5 mg oral tablet: 1 tab(s) orally 2 times a day  atorvastatin 40 mg oral tablet: 1 tab(s) orally once a day (at bedtime)  ferrous gluconate 324 mg (38 mg elemental iron) oral tablet: 1 orally 2 times a day  gabapentin 100 mg oral capsule: 2 cap(s) orally once a day (at bedtime)  insulin glargine 100 units/mL subcutaneous solution: 12 unit(s) subcutaneous once a day (at bedtime)- Home dose  Keppra 500 mg oral tablet: 1 tab(s) orally 2 times a day   metFORMIN 500 mg oral tablet: 1 tab(s) orally 2 times a day  metoprolol tartrate 25 mg oral tablet: 1 tab(s) orally 2 times a day  ocular lubricant ophthalmic solution: 1 drop(s) to each affected eye 3 times a day  pantoprazole 40 mg oral delayed release tablet: 1 tab(s) orally once a day (before a meal)   apixaban 2.5 mg oral tablet: 1 tab(s) orally 2 times a day  atorvastatin 40 mg oral tablet: 1 tab(s) orally once a day (at bedtime)  cefpodoxime 100 mg oral tablet: 1 tab(s) orally 2 times a day  ferrous gluconate 324 mg (38 mg elemental iron) oral tablet: 1 orally 2 times a day  gabapentin 100 mg oral capsule: 2 cap(s) orally once a day (at bedtime)  insulin glargine 100 units/mL subcutaneous solution: 12 unit(s) subcutaneous once a day (at bedtime)- Home dose  Keppra  mg oral tablet, extended release: 2 tab(s) orally 2 times a day  metFORMIN 500 mg oral tablet: 1 tab(s) orally 2 times a day  metoprolol tartrate 25 mg oral tablet: 1 tab(s) orally 2 times a day  ocular lubricant ophthalmic solution: 1 drop(s) to each affected eye 3 times a day  pantoprazole 40 mg oral delayed release tablet: 1 tab(s) orally once a day (before a meal)

## 2023-07-18 NOTE — OCCUPATIONAL THERAPY INITIAL EVALUATION ADULT - LIGHT TOUCH SENSATION, LUE, REHAB EVAL
within normal limits Mastoid Interpolation Flap Text: A decision was made to reconstruct the defect utilizing an interpolation axial flap and a staged reconstruction.  A telfa template was made of the defect.  This telfa template was then used to outline the mastoid interpolation flap.  The donor area for the pedicle flap was then injected with anesthesia.  The flap was excised through the skin and subcutaneous tissue down to the layer of the underlying musculature.  The pedicle flap was carefully excised within this deep plane to maintain its blood supply.  The edges of the donor site were undermined.   The donor site was closed in a primary fashion.  The pedicle was then rotated into position and sutured.  Once the tube was sutured into place, adequate blood supply was confirmed with blanching and refill.  The pedicle was then wrapped with xeroform gauze and dressed appropriately with a telfa and gauze bandage to ensure continued blood supply and protect the attached pedicle.

## 2023-07-18 NOTE — DISCHARGE NOTE PROVIDER - HOSPITAL COURSE
82yo woman with HTN, DM, HLD, CAD, MI, PAD, peripheral neuropathy, former smoker, NSCLC with ANTHONY primary tumor and brain metastasis s/p right craniotomy & resection on 7/27/22 and post-op RT on active treatment, recent STEMI (10/2022), afib on eliquis 2.5mg, hx of RML PE presenting from home with confusion. Son and daughter in law at bedside. They visited her this afternoon and she was in her normal state of health, LKW 3:30pm. Patient has a live in home aid who is on vacation this week. Aid helps with showering and administering medication. Unknown if and when she took her last eliquis. She ambulates with a walker. Around 7pm she called her son and the daughter in law picked up the phone. She sounded confusion and slight slurred speech. Patient told them to come over because something was "wrong with my head". Patient has no complaints. She is AAOx3. Denies weakness, numbness, vision changes, trouble swallowing. Patient reports she was losing periods of time through her day. She feels like she lost her mind. Patient thinks she would loose about 20 minutes at a time. She says she felt an abnormal feeling/shaking inside? Denies shaking of extremities. 84yo woman with HTN, DM, HLD, CAD, MI, PAD, peripheral neuropathy, former smoker, NSCLC with ANTHONY primary tumor and brain metastasis s/p right craniotomy & resection on 7/27/22 and post-op RT on active treatment, recent STEMI (10/2022), afib on Eliquis 2.5mg, hx of RML PE presented to the ED from home with confusion. Son and daughter in law at bedside. They visited her this afternoon and she was in her normal state of health, LKW 3:30pm. Unknown if and when she took her last eliquis. She ambulates with a walker. Around 7pm she called her son and the daughter in law picked up the phone. She sounded confusion and slight slurred speech. Patient told them to come over because something was "wrong with my head". Denies weakness, numbness, vision changes, trouble swallowing. Patient reports she was losing periods of time through her day. She feels like she lost her mind. Patient thinks she would loose about 20 minutes at a time.    Hospital course: The patient was admitted to EMU for further workup. MRI Brain completed which showed stable findings. Keppra 750mg BID continued and pt was connected to vEEG monitoring.     MRI Brain 7/17/23:  No significant interval change from 6/15/2023.  Similar-appearing postsurgical encephalomalacia and gliosis in the right mid temporal lobe.  Similar 5 mm focus of enhancement along the mesial aspect of the surgical cavity, likely postsurgical in nature.  No new abnormal parenchymal or leptomeningeal enhancement.  Similar nonspecific T2 and FLAIR hyperintense signal surrounding the surgical cavity, likely representing gliosis.    CT Head 7/16/23:  Stable exam. No acute intracranial hemorrhage, vasogenic edema, hydrocephalus or extra-axial collection. Status post right temporal craniotomy with similar appearing postoperative changes.    CTA Head/Neck 7/16/23:  CT PERFUSION: Perfusion abnormality localized to hypodense region in the   temporal lobe, related to postoperative changes rather than true   perfusion defect. No evidence of core infarct or ischemic penumbra   otherwise.    CT ANGIOGRAPHY NECK: Patent vasculature without occlusion. Changes of   prior right carotid endarterectomy. Marked narrowing and luminal   irregularity of the proximal right internal carotid artery just beyond   the origin with limited assessment of stenosis by NASCET criteria given   the overall diminutive caliber to the cervical right internal carotid   artery. Moderate stenosis origin left internal carotid artery by NASCET   criteria. No critical stenosis or dissection.    CT ANGIOGRAPHY BRAIN: No vessel occlusion, flow-limiting stenosis or   aneurysm.    Preliminary findings were discussed with Dr. Hunt, Neurology at   7/16/2023 9:15PM.    NASCET CAROTID STENOSIS CRITERIA (distal normal appearing ICA as   denominator for measurement): 0%-none, 1-49%-mild, 50-70%-moderate,   70-89%-severe, 90-99%-critical.     84yo woman with HTN, DM, HLD, CAD, MI, PAD, peripheral neuropathy, former smoker, NSCLC with ANTHONY primary tumor and brain metastasis s/p right craniotomy & resection on 7/27/22 and post-op RT on active treatment, recent STEMI (10/2022), afib on Eliquis 2.5mg, hx of RML PE presented to the ED from home with confusion. Son and daughter in law at bedside. They visited her this afternoon and she was in her normal state of health, LKW 3:30pm. Unknown if and when she took her last eliquis. She ambulates with a walker. Around 7pm she called her son and the daughter in law picked up the phone. She sounded confusion and slight slurred speech. Patient told them to come over because something was "wrong with my head". Denies weakness, numbness, vision changes, trouble swallowing. Patient reports she was losing periods of time through her day. She feels like she lost her mind. Patient thinks she would loose about 20 minutes at a time.    Hospital course: The patient was admitted to EMU for further workup. MRI Brain completed which showed stable findings. Keppra 750mg BID continued and pt was connected to vEEG monitoring. vEEG read showed no evidence of seizures, but given clinical event the decision was made to increase the patient's Keppra to 1000mg BID. The patient was cleared for discharge by the Neurology attending and will follow up as an outpatient.     MRI Brain 7/17/23:  No significant interval change from 6/15/2023.  Similar-appearing postsurgical encephalomalacia and gliosis in the right mid temporal lobe.  Similar 5 mm focus of enhancement along the mesial aspect of the surgical cavity, likely postsurgical in nature.  No new abnormal parenchymal or leptomeningeal enhancement.  Similar nonspecific T2 and FLAIR hyperintense signal surrounding the surgical cavity, likely representing gliosis.    CT Head 7/16/23:  Stable exam. No acute intracranial hemorrhage, vasogenic edema, hydrocephalus or extra-axial collection. Status post right temporal craniotomy with similar appearing postoperative changes.    CTA Head/Neck 7/16/23:  CT PERFUSION: Perfusion abnormality localized to hypodense region in the   temporal lobe, related to postoperative changes rather than true   perfusion defect. No evidence of core infarct or ischemic penumbra   otherwise.    CT ANGIOGRAPHY NECK: Patent vasculature without occlusion. Changes of   prior right carotid endarterectomy. Marked narrowing and luminal   irregularity of the proximal right internal carotid artery just beyond   the origin with limited assessment of stenosis by NASCET criteria given   the overall diminutive caliber to the cervical right internal carotid   artery. Moderate stenosis origin left internal carotid artery by NASCET   criteria. No critical stenosis or dissection.    CT ANGIOGRAPHY BRAIN: No vessel occlusion, flow-limiting stenosis or   aneurysm.    NASCET CAROTID STENOSIS CRITERIA (distal normal appearing ICA as   denominator for measurement): 0%-none, 1-49%-mild, 50-70%-moderate,   70-89%-severe, 90-99%-critical.

## 2023-07-18 NOTE — DISCHARGE NOTE NURSING/CASE MANAGEMENT/SOCIAL WORK - PATIENT PORTAL LINK FT
You can access the FollowMyHealth Patient Portal offered by [facility] by registering at the following website: [email or web address]. By joining [facility]’s FollowMyHealth portal, you will also be able to view your health information using other applications (apps) compatible with our system.

## 2023-07-18 NOTE — PHYSICAL THERAPY INITIAL EVALUATION ADULT - PERTINENT HX OF CURRENT PROBLEM, REHAB EVAL
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 Son and daughter in law at bedside. They visited her this afternoon and she was in her normal state of health, LKW 3:30pm. Around 7pm she called her son and the daughter in law picked up the phone. She sounded confusion and slight slurred speech. Patient told them to come over because something was "wrong with my head". Patient has no complaints. She is AAOx3. Denies weakness, numbness, vision changes, trouble swallowing. Ct head: Stable exam. No acute intracranial hemorrhage, vasogenic edema, hydrocephalus or extra-axial collection. Status post right temporal craniotomy with similar appearing postoperative changes. MRI brain with and without contrast 6/15/23 Stable right temporal postoperative changes. Stable subcentimeter focus of enhancement along the medial aspect of the cavity, likely posttreatment changes. No new or worsening enhancement identified. CT PERFUSION: Perfusion abnormality localized to hypodense region in the temporal lobe, related to postoperative changes rather than true perfusion defect. No evidence of core infarct or ischemic penumbra otherwise.CT ANGIOGRAPHY NECK: Patent vasculature without occlusion. Changes of prior right carotid endarterectomy. Marked narrowing and luminal irregularity of the proximal right internal carotid artery just beyond the origin with limited assessment of stenosis by NASCET criteria given the overall diminutive caliber to the cervical right internal carotid artery. Moderate stenosis origin left internal carotid artery by NASCET criteria. No critical stenosis or dissection.CT ANGIOGRAPHY BRAIN: No vessel occlusion, flow-limiting stenosis or aneurysm. Not a tenecteplace candidate due to not in the window. Not MT candidate due to no LVO. Impression: confusion with loss of periods of time 2/2 unknown etiology. may be nonconvulsive seizures with post-ictal periods of confusion vs toxic metabolic etiology vs acute ischemic infarct 83y woman with a PMHx significant for HTN, DM, HLD, CAD, MI, PAD, peripheral neuropathy, former smoker, NSCLC with ANTHONY primary tumor and brain metastasis s/p right craniotomy & resection on 7/27/22 and post-op RT on active treatment, recent STEMI (10/2022), afib on eliquis 2.5mg, hx of RML PE presenting from home with confusion. Son and daughter in law at bedside. They visited her this afternoon and she was in her normal state of health, LKW 3:30pm. Around 7pm she called her son and the daughter in law picked up the phone. She sounded confusion and slight slurred speech. Patient told them to come over because something was "wrong with my head". Patient has no complaints. She is AAOx3. Denies weakness, numbness, vision changes, trouble swallowing. Ct head: Stable exam. No acute intracranial hemorrhage, vasogenic edema, hydrocephalus or extra-axial collection. Status post right temporal craniotomy with similar appearing postoperative changes. MRI brain with and without contrast 6/15/23 Stable right temporal postoperative changes. Stable subcentimeter focus of enhancement along the medial aspect of the cavity, likely posttreatment changes. No new or worsening enhancement identified. CT PERFUSION: Perfusion abnormality localized to hypodense region in the temporal lobe, related to postoperative changes rather than true perfusion defect. No evidence of core infarct or ischemic penumbra otherwise.CT ANGIOGRAPHY NECK: Patent vasculature without occlusion. Changes of prior right carotid endarterectomy. Marked narrowing and luminal irregularity of the proximal right internal carotid artery just beyond the origin with limited assessment of stenosis by NASCET criteria given the overall diminutive caliber to the cervical right internal carotid artery. Moderate stenosis origin left internal carotid artery by NASCET criteria. No critical stenosis or dissection.CT ANGIOGRAPHY BRAIN: No vessel occlusion, flow-limiting stenosis or aneurysm. Not a tenecteplace candidate due to not in the window. Not MT candidate due to no LVO. Impression: confusion with loss of periods of time 2/2 unknown etiology. may be nonconvulsive seizures with post-ictal periods of confusion vs toxic metabolic etiology vs acute ischemic infarct

## 2023-07-18 NOTE — PROGRESS NOTE ADULT - TIME BILLING
agree with above  #AMS  -CT brain noted  -Neuro w/u  -Infectious w/u  -F/u eeg  -MRI brain noted     #Paroxysmal atrial fibrillation  -Continue apixaban   -Continue metoprolol    #hypertension  -stable  -Cont bb    #Hx NSTEMI, CAD, s/p PCI  -EKG NSR 80s, no significant changes  -had recent STEMI in 8/2022 treated medically at the time in setting of recent craniotomy  -No chest pain or sob  -Cont atorvastatin  -F/u echo

## 2023-07-18 NOTE — OCCUPATIONAL THERAPY INITIAL EVALUATION ADULT - PERTINENT HX OF CURRENT PROBLEM, REHAB EVAL
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 Son and daughter in law at bedside. They visited her this afternoon and she was in her normal state of health, LKW 3:30pm. Around 7pm she called her son and the daughter in law picked up the phone. She sounded confusion and slight slurred speech. Patient told them to come over because something was "wrong with my head". Patient has no complaints. She is AAOx3. Denies weakness, numbness, vision changes, trouble swallowing. Ct head: Stable exam. No acute intracranial hemorrhage, vasogenic edema, hydrocephalus or extra-axial collection. Status post right temporal craniotomy with similar appearing postoperative changes. MRI brain with and without contrast 6/15/23 Stable right temporal postoperative changes. Stable subcentimeter focus of enhancement along the medial aspect of the cavity, likely posttreatment changes. No new or worsening enhancement identified. CT PERFUSION: Perfusion abnormality localized to hypodense region in the temporal lobe, related to postoperative changes rather than true perfusion defect. No evidence of core infarct or ischemic penumbra otherwise.CT ANGIOGRAPHY NECK: Patent vasculature without occlusion. Changes of prior right carotid endarterectomy. Marked narrowing and luminal irregularity of the proximal right internal carotid artery just beyond the origin with limited assessment of stenosis by NASCET criteria given the overall diminutive caliber to the cervical right internal carotid artery. Moderate stenosis origin left internal carotid artery by NASCET criteria. No critical stenosis or dissection.CT ANGIOGRAPHY BRAIN: No vessel occlusion, flow-limiting stenosis or aneurysm. Not a tenecteplace candidate due to not in the window. Not MT candidate due to no LVO. Impression: confusion with loss of periods of time 2/2 unknown etiology. may be nonconvulsive seizures with post-ictal periods of confusion vs toxic metabolic etiology vs acute ischemic infarct

## 2023-07-18 NOTE — PHYSICAL THERAPY INITIAL EVALUATION ADULT - GAIT DISTANCE, PT EVAL
x 2 trials/25 feet x 2 trials, 1st trial without rolling walker requiring contact guard, 2nd trial with rolling walker requiring supervision/25 feet

## 2023-07-18 NOTE — DISCHARGE NOTE PROVIDER - NSDCFUSCHEDAPPT_GEN_ALL_CORE_FT
[facility]  [facility]  Scheduled Appointment: 07/20/2023    [facility]  [facility]  Scheduled Appointment: 07/20/2023    [facility]  [facility]  Scheduled Appointment: 08/10/2023    [facility]  [facility]  Scheduled Appointment: 08/10/2023    [facility]  [facility]  Scheduled Appointment: 08/31/2023    [facility]  [facility]  Scheduled Appointment: 08/31/2023    [facility]  [facility]  [facility] [address] [place] [address]  Scheduled Appointment: 09/07/2023    [facility]  [facility]  Scheduled Appointment: 09/21/2023    [facility]  [facility]  Scheduled Appointment: 09/21/2023

## 2023-07-18 NOTE — PHYSICAL THERAPY INITIAL EVALUATION ADULT - ADDITIONAL COMMENTS
PTA pt states she ambulated independently however can be limited by back pain, limits her from getting OOB some days, does use a rolling walker if she needs to. Pt has 24/7 HHA, aide provides supervision for ADLs (pt uses shower chair, can shower self however aide assists her getting in & out), assists with cooking/cleaning, is there to assist pt if needed. Pt owns rolling walker, shower chair, transport wheelchair.

## 2023-07-19 ENCOUNTER — NON-APPOINTMENT (OUTPATIENT)
Age: 83
End: 2023-07-19

## 2023-07-19 LAB
-  AMIKACIN: SIGNIFICANT CHANGE UP
-  AMOXICILLIN/CLAVULANIC ACID: SIGNIFICANT CHANGE UP
-  AMPICILLIN/SULBACTAM: SIGNIFICANT CHANGE UP
-  AMPICILLIN: SIGNIFICANT CHANGE UP
-  AZTREONAM: SIGNIFICANT CHANGE UP
-  CEFAZOLIN: SIGNIFICANT CHANGE UP
-  CEFEPIME: SIGNIFICANT CHANGE UP
-  CEFOXITIN: SIGNIFICANT CHANGE UP
-  CEFTRIAXONE: SIGNIFICANT CHANGE UP
-  CEFUROXIME: SIGNIFICANT CHANGE UP
-  CIPROFLOXACIN: SIGNIFICANT CHANGE UP
-  ERTAPENEM: SIGNIFICANT CHANGE UP
-  GENTAMICIN: SIGNIFICANT CHANGE UP
-  IMIPENEM: SIGNIFICANT CHANGE UP
-  LEVOFLOXACIN: SIGNIFICANT CHANGE UP
-  MEROPENEM: SIGNIFICANT CHANGE UP
-  NITROFURANTOIN: SIGNIFICANT CHANGE UP
-  PIPERACILLIN/TAZOBACTAM: SIGNIFICANT CHANGE UP
-  TOBRAMYCIN: SIGNIFICANT CHANGE UP
-  TRIMETHOPRIM/SULFAMETHOXAZOLE: SIGNIFICANT CHANGE UP
CULTURE RESULTS: SIGNIFICANT CHANGE UP
LEVETIRACETAM SERPL-MCNC: 48.4 UG/ML — HIGH (ref 10–40)
METHOD TYPE: SIGNIFICANT CHANGE UP
ORGANISM # SPEC MICROSCOPIC CNT: SIGNIFICANT CHANGE UP
ORGANISM # SPEC MICROSCOPIC CNT: SIGNIFICANT CHANGE UP
SPECIMEN SOURCE: SIGNIFICANT CHANGE UP

## 2023-07-20 ENCOUNTER — RESULT REVIEW (OUTPATIENT)
Age: 83
End: 2023-07-20

## 2023-07-20 ENCOUNTER — APPOINTMENT (OUTPATIENT)
Dept: HEMATOLOGY ONCOLOGY | Facility: CLINIC | Age: 83
End: 2023-07-20
Payer: MEDICARE

## 2023-07-20 ENCOUNTER — APPOINTMENT (OUTPATIENT)
Dept: INFUSION THERAPY | Facility: HOSPITAL | Age: 83
End: 2023-07-20

## 2023-07-20 VITALS
DIASTOLIC BLOOD PRESSURE: 63 MMHG | BODY MASS INDEX: 28.77 KG/M2 | RESPIRATION RATE: 16 BRPM | SYSTOLIC BLOOD PRESSURE: 104 MMHG | WEIGHT: 159.84 LBS | TEMPERATURE: 205.7 F | HEART RATE: 63 BPM | OXYGEN SATURATION: 91 %

## 2023-07-20 LAB
ALBUMIN SERPL ELPH-MCNC: 3.9 G/DL — SIGNIFICANT CHANGE UP (ref 3.3–5)
ALP SERPL-CCNC: 76 U/L — SIGNIFICANT CHANGE UP (ref 40–120)
ALT FLD-CCNC: 14 U/L — SIGNIFICANT CHANGE UP (ref 10–45)
ANION GAP SERPL CALC-SCNC: 14 MMOL/L — SIGNIFICANT CHANGE UP (ref 5–17)
AST SERPL-CCNC: 13 U/L — SIGNIFICANT CHANGE UP (ref 10–40)
BASOPHILS # BLD AUTO: 0.04 K/UL — SIGNIFICANT CHANGE UP (ref 0–0.2)
BASOPHILS NFR BLD AUTO: 0.5 % — SIGNIFICANT CHANGE UP (ref 0–2)
BILIRUB SERPL-MCNC: 0.4 MG/DL — SIGNIFICANT CHANGE UP (ref 0.2–1.2)
BUN SERPL-MCNC: 22 MG/DL — SIGNIFICANT CHANGE UP (ref 7–23)
CALCIUM SERPL-MCNC: 8.6 MG/DL — SIGNIFICANT CHANGE UP (ref 8.4–10.5)
CHLORIDE SERPL-SCNC: 103 MMOL/L — SIGNIFICANT CHANGE UP (ref 96–108)
CO2 SERPL-SCNC: 23 MMOL/L — SIGNIFICANT CHANGE UP (ref 22–31)
CREAT SERPL-MCNC: 0.94 MG/DL — SIGNIFICANT CHANGE UP (ref 0.5–1.3)
EGFR: 60 ML/MIN/1.73M2 — SIGNIFICANT CHANGE UP
EOSINOPHIL # BLD AUTO: 0.3 K/UL — SIGNIFICANT CHANGE UP (ref 0–0.5)
EOSINOPHIL NFR BLD AUTO: 3.4 % — SIGNIFICANT CHANGE UP (ref 0–6)
GLUCOSE SERPL-MCNC: 191 MG/DL — HIGH (ref 70–99)
HCT VFR BLD CALC: 36.3 % — SIGNIFICANT CHANGE UP (ref 34.5–45)
HGB BLD-MCNC: 12 G/DL — SIGNIFICANT CHANGE UP (ref 11.5–15.5)
IMM GRANULOCYTES NFR BLD AUTO: 0.5 % — SIGNIFICANT CHANGE UP (ref 0–0.9)
LYMPHOCYTES # BLD AUTO: 1.9 K/UL — SIGNIFICANT CHANGE UP (ref 1–3.3)
LYMPHOCYTES # BLD AUTO: 21.5 % — SIGNIFICANT CHANGE UP (ref 13–44)
MCHC RBC-ENTMCNC: 31.8 PG — SIGNIFICANT CHANGE UP (ref 27–34)
MCHC RBC-ENTMCNC: 33.1 G/DL — SIGNIFICANT CHANGE UP (ref 32–36)
MCV RBC AUTO: 96.3 FL — SIGNIFICANT CHANGE UP (ref 80–100)
MONOCYTES # BLD AUTO: 0.5 K/UL — SIGNIFICANT CHANGE UP (ref 0–0.9)
MONOCYTES NFR BLD AUTO: 5.6 % — SIGNIFICANT CHANGE UP (ref 2–14)
NEUTROPHILS # BLD AUTO: 6.07 K/UL — SIGNIFICANT CHANGE UP (ref 1.8–7.4)
NEUTROPHILS NFR BLD AUTO: 68.5 % — SIGNIFICANT CHANGE UP (ref 43–77)
NRBC # BLD: 0 /100 WBCS — SIGNIFICANT CHANGE UP (ref 0–0)
PLATELET # BLD AUTO: 251 K/UL — SIGNIFICANT CHANGE UP (ref 150–400)
POTASSIUM SERPL-MCNC: 4.2 MMOL/L — SIGNIFICANT CHANGE UP (ref 3.5–5.3)
POTASSIUM SERPL-SCNC: 4.2 MMOL/L — SIGNIFICANT CHANGE UP (ref 3.5–5.3)
PROT SERPL-MCNC: 5.9 G/DL — LOW (ref 6–8.3)
RBC # BLD: 3.77 M/UL — LOW (ref 3.8–5.2)
RBC # FLD: 12.2 % — SIGNIFICANT CHANGE UP (ref 10.3–14.5)
SODIUM SERPL-SCNC: 140 MMOL/L — SIGNIFICANT CHANGE UP (ref 135–145)
WBC # BLD: 8.85 K/UL — SIGNIFICANT CHANGE UP (ref 3.8–10.5)
WBC # FLD AUTO: 8.85 K/UL — SIGNIFICANT CHANGE UP (ref 3.8–10.5)

## 2023-07-20 PROCEDURE — 99214 OFFICE O/P EST MOD 30 MIN: CPT

## 2023-07-21 DIAGNOSIS — Z51.11 ENCOUNTER FOR ANTINEOPLASTIC CHEMOTHERAPY: ICD-10-CM

## 2023-07-21 DIAGNOSIS — C34.12 MALIGNANT NEOPLASM OF UPPER LOBE, LEFT BRONCHUS OR LUNG: ICD-10-CM

## 2023-07-21 DIAGNOSIS — E05.90 THYROTOXICOSIS, UNSPECIFIED WITHOUT THYROTOXIC CRISIS OR STORM: ICD-10-CM

## 2023-07-26 NOTE — HISTORY OF PRESENT ILLNESS
[Disease: _____________________] : Disease: [unfilled] [AJCC Stage: ____] : AJCC Stage: [unfilled] [de-identified] : 82F former smoker with multiple comorbidities was undergoing orthopedic evaluation as an outpatient due to ongoing chronic back pain and gait imbalance.  She ultimately presented to [facility] ED on 7/19/2022.  Brain imaging demonstrated evidence of metastatic disease including a dominant right temporal lobe 3.1 cm enhancing mass with vasogenic edema.  CT scan of her body demonstrated a ANTHONY tumor with subcentimeter pulmonary nodules and a nonspecific adrenal nodule.  Bronchoscopy with biopsy performed on 7/20/2022 demonstrated adenocarcinoma consistent with lung primary; tumor tested PD-L1 negative and was negative for actionable mutations. however, tested TMB-High.  She underwent craniotomy resection of the dominant brain mass on 7/27/2022 with pathology revealing the same findings.  Hospital course was complicated by NSTEMI, Afib with RVR and RML PE.  She was put on medications for seizure prophylaxis.  She was subsequently discharged to acute rehab on 8/10–8/26/2022.  Treated with GK-SRS to brain metastases in Sept 2022. Started 1st line single agent Pembro in Oct 2022; achieved [place].  [de-identified] : – Left mainstem bronchus biopsy 7/20/2022: Poorly differentiated adenocarcinoma of lung origin.\par PD-L1 negative/0%.\par Foundation One:  No actionable mutations (TP53+, among others); TMB-High.  \par –Right brain tumor resection 7/27/2022: Metastatic non-small cell carcinoma, favor adenocarcinoma, consistent with lung origin.\par PD-L1 negative. [de-identified] : Patient started first-line single agent Pembrolizumab in Oct 2022; achieved [place]. \par On 7/16, the patient noticed gait imbalance, confusion and slurred speech. She was also unsure of when she took her Eliquis. \par The patient was admitted to EMU for further workup. MRI Brain completed which showed stable findings. Keppra 750mg BID continued and pt was connected to vEEG monitoring. vEEG read showed no evidence of seizures, but given clinical event the decision was made to increase the patient's Keppra to 1000mg BID. The patient was cleared for discharge by the Neurology attending and will follow up as an outpatient. She was discharged home on 7/18. \par \par Patient presents today with her son and caregiver. She reports compliance with increased dose of Keppra 1000mg bid.  She is feeling improved since her hospitalization without further confusion but is still fatigued. ~Two weeks ago had an epidural injection in her low back with significant improvement of pain; she follows with palliative care for her noncancer related back pain. We discussed that she has the option to continue medical cannabis as needed.  She continues gabapentin. Reports that she has resumed Eliquis. Denies N/V/D/C.

## 2023-07-26 NOTE — ASSESSMENT
[FreeTextEntry1] : Metastatic NSCLC with adenocarcinoma histology; tumor tested PDL1 Negative and lacks actionable mutations but tested TMB-High.  Treated with GK-SRS for brain metastases in Sept 2022.  Started first line single agent Pembrolizumab in Oct 2022; achieved [place]. \par Restaging Brain MRI June 2023 with sustained intracranial response.  \par Restaging PET/CT June 2023 with sustained response.  \par Recommend:\par – Continue with first-line systemic therapy with single agent Pembrolizumab for as long as it benefits the patient.  Scheduled for treatment later this week. 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  Monitor. \par – Brain Metastases:  F/U with Radiation oncology s/p GK-SRS to brain metastases.  Follows with Neuro-Onc as well. Keppra increased to 1000mg bid during 7/16-18 hospitalization given clinical event though no seizure activity on EEG.  \par –Continue f/u with Palliative Care \par – PE: Continue Eliquis anticoagulation indefinitely given PE and Afib.\par – Continue follow-up and management with her PCP/cardiologist for her other ongoing medical problems and comorbidities.\par -Back pain: Pt with h/o spinal stenosis.  Now s/p Epidural with Dr. Ryder Salazar([phone]). Follows with Pall Care.  Takes gabapentin, tramadol and medical cannabis. \par -Iron deficiency:  On PO iron repletion.  Monitor hemoglobin. \par -Pruritus:  request for Derm consultation.  \par -Follow up prior to each cycle.  Plan to obtain her next restaging body scan in OCTOBER if she remains clinically stable

## 2023-07-26 NOTE — RESULTS/DATA
[FreeTextEntry1] : -CT Brain 7/18/22:  Cystic versus a necrotic masses measure approximately 4.8 x 3.2 x 3.2 cm in the right temporal lobe and 1.4 x 1.3 x 1.6 cm in the medial left frontal lobe.  Moderate to large amount of vasogenic edema in the right frontal, parietal and temporal lobes. Trace edema in the left frontal lobe.  Regional mass effect in the right cerebral hemisphere with partial effacement of the ventricular system and 5 mm subfalcine herniation of the right frontal lobe underneath the cerebral falx.  The findings may represent intracranial metastatic disease versus glioblastoma. Contrast-enhanced brain MRI is recommended for further evaluation.\par \par -CT C/A/P 7/18/22:  Left upper lobe lung mass with partial atelectasis of the left upper lobe, concerning for primary lung neoplasm. Few scattered sub-4 mm pulmonary nodules, indeterminate.  Indeterminant left adrenal nodule for which contrast enhanced MRI is recommended for further evaluation.\par \par -MRI Brain 7/20/22:  Right temporal lobe peripherally enhancing, centrally necrotic 3.1 x 2.6 cm mass demonstrating mild diffusion restriction. There is surrounding vasogenic edema. 8 mm leftward midline shift. Right uncal herniation. Additional left parafalcine 1.3 x 0.9 cm rim-enhancing cystic lesion with mild surrounding vasogenic edema. Tiny 0.3 x 0.3 cm rim-enhancing lesion within the left frontal cortex. Findings are concerning for metastatic disease in the context of patient's known primary lung cancer. The left lateral ventricle is mildly dilated.\par \par -LE Dopplers 7/24/22:  No evidence of deep venous thrombosis in either lower extremity.\par -LE Dopplers 7/28/22:  No evidence of deep venous thrombosis in either lower extremity.\par \par -CT Chest Angio 8/3/22:  Pulmonary embolism in the lateral segmental branch of the right middle lobe. No evidence of right heart strain.  Interval decrease in left upper lobe mass.  Unchanged obstruction of the posterior bronchus, and stable fibroglandular lymph node.\par \par -Abdominal U/S 8/4/22:   Mildly distended gallbladder. No evidence of acute cholecystitis.  Possible mild hydronephrosis.\par \par -LE Dopplers 8/7/22:  No evidence of deep venous thrombosis in either lower extremity.\par \par -LE Dopplers 9/1/22:  No evidence of deep venous thrombosis in either lower extremity.\par \par -PET/CT 9/9/22:  Abnormal FDG-PET/CT scan.\par 1. FDG-avid left upper lobe lung mass abutting the mediastinum at level of AP window, with associated atelectasis along the mediastinum, unchanged as compared to CT dated 8/3/2022, corresponds to patient's known adenocarcinoma.\par 2. Right temporal craniotomy with photopenic low attenuation within right temporal lobe, compatible with vasogenic edema, gliosis, and/or postsurgical changes, better evaluated on contrast-enhanced CT or MRI.\par 3. Findings suggestive of left vocal cord paralysis, likely secondary to left upper lobe lung mass. Please correlate clinically and with direct visualization, as indicated.\par 4. Nonspecific hypermetabolism in small bowel and colon likely is related to metformin.\par 5. No scan evidence of metastatic disease.\par \par -PET/CT 12/8/22: \par 1. FDG-avid left upper lobe lung nodule is decreased in size and metabolism, compatible with a partial response to interval therapy. Interval decrease in adjacent left upper lobe paramediastinal opacity, likely atelectasis, which is unchanged in metabolism.\par 2. Right temporal craniotomy with photopenic low attenuation within right temporal lobe, compatible with vasogenic edema, gliosis, and/or postsurgical changes, better evaluated on contrast-enhanced CT or MRI, not significantly changed.\par 3. 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 Nonspecific hypermetabolism in small bowel and colon, unchanged, likely related to metformin.\par 5. No scan evidence of metastatic disease.\par \par -MRI 12/8/22:  Interval decrease in size and peripheral enhancement of the medial left frontal lesion. Smaller ring-enhancing lesion more laterally along the left frontal cortex no longer visualized.  No new lesions seen.  Postsurgical cavity right temporal lobe with mild adjacent enhancement. Continued follow-up recommended.\par \par –MRI Brain 2/23/23:  Stable medial left frontal lobe nonenhancing cystic lesion. Stable linear enhancement medial to the right temporal lobe surgical cavity. No new areas abnormal enhancement. Continued follow-up is recommended\par \par -PET/CT 3/2/23:  Compared to FDG PET/CT dated 12/8/2022:\par 1. FDG-avid left upper lobe lung nodule is not significantly changed in size or metabolism, compatible with stable disease. Interval decrease in size and metabolism of adjacent left upper lobe opacity abutting the mediastinum.\par 2. Nonspecific FDG-avid skin thickening, bilateral mid/lower anterior abdominal wall with new associated subcutaneous stranding, and increased in metabolism, likely secondary to inflammation. Please correlate clinically.\par 3. Right temporal craniotomy with photopenic low attenuation within right temporal lobe, compatible with vasogenic edema, gliosis, and/or postsurgical changes, better evaluated on contrast-enhanced CT or MRI, not significantly changed.\par 4. Nonspecific pancolonic hypermetabolism, not significantly changed, likely is related to metformin.\par 5. No scan evidence of metastatic disease.\par \par Images Reviewed/Interpreted:\par \par -MRI Brain 6/15/23:  Stable right temporal postoperative changes. Stable subcentimeter focus of enhancement along the medial aspect of the cavity, likely posttreatment changes. No new or worsening enhancement identified.\par \par -PET/CT 6/22/23:  Compared to FDG PET/CT dated 3/2/2023:\par 1. Mildly FDG-avid left upper lobe lung nodule is not significantly changed in size or metabolism, compatible with stable disease. A minimally FDG-avid adjacent left upper lobe opacity abutting the mediastinum is decreased in size and unchanged and avidity.\par 2. Right temporal craniotomy with photopenic low attenuation within right temporal lobe, compatible with posttreatment changes, unchanged.\par 3. Nonspecific diffuse distal small bowel hypermetabolism, increased as compared to prior study, and pancolonic hypermetabolism, decreased as compared to prior study, without corresponding abnormalities on CT, likely are related to metformin.\par 4. Nonspecific mildly FDG-avid skin thickening and subcutaneous stranding, bilateral mid/lower anterior abdominal wall, decreased as compared to prior study. Please correlate clinically.\par 5. Remainder of study demonstrates no evidence of FDG-avid disease.\par \par In hospital:\par -MRI Brain 7/16/23: No significant interval change from 6/15/2023. \par Similar-appearing postsurgical encephalomalacia and gliosis in the right mid temporal lobe.\par Similar 5 mm focus of enhancement along the mesial aspect of the surgical cavity, likely postsurgical in nature.\par No new abnormal parenchymal or leptomeningeal enhancement.\par Similar nonspecific T2 and FLAIR hyperintense signal surrounding the surgical cavity, likely representing gliosis.\par \par -CT PERFUSION 7/16/23: Perfusion abnormality localized to hypodense region in the temporal lobe, related to postoperative changes rather than true perfusion defect. No evidence of core infarct or ischemic penumbra otherwise.\par \par -CT ANGIOGRAPHY NECK 7/16/23: Patent vasculature without occlusion. Changes of prior right carotid endarterectomy. Marked narrowing and luminal irregularity of the proximal right internal carotid artery just beyond the origin with limited assessment of stenosis by NASCET criteria given the overall diminutive caliber to the cervical right internal carotid artery. Moderate stenosis origin left internal carotid artery by NASCET criteria. No critical stenosis or dissection.\par \par -CT ANGIOGRAPHY BRAIN 7/16/23: No vessel occlusion, flow-limiting stenosis or aneurysm.\par NASCET CAROTID STENOSIS CRITERIA (distal normal appearing ICA as denominator for measurement): 0%-none, 1-49%-mild, 50-70%-moderate, 70-89%-severe, 90-99%-critical.\par \par

## 2023-07-26 NOTE — PHYSICAL EXAM
[Ambulatory and capable of all self care but unable to carry out any work activities] : Status 2- Ambulatory and capable of all self care but unable to carry out any work activities. Up and about more than 50% of waking hours [Normal] : affect appropriate [de-identified] : Elderly woman NAD [de-identified] : No icterus  [de-identified] : MMM O/P Clear  [de-identified] : No LAD  [de-identified] : Clear  [de-identified] : S1 S2 [de-identified] : LE edema resolved [de-identified] : No spine/CVA tenderness [de-identified] : Scattered erythema secondary to scratching. No rash noted

## 2023-08-10 ENCOUNTER — APPOINTMENT (OUTPATIENT)
Dept: HEMATOLOGY ONCOLOGY | Facility: CLINIC | Age: 83
End: 2023-08-10
Payer: MEDICARE

## 2023-08-10 ENCOUNTER — RESULT REVIEW (OUTPATIENT)
Age: 83
End: 2023-08-10

## 2023-08-10 ENCOUNTER — APPOINTMENT (OUTPATIENT)
Dept: INFUSION THERAPY | Facility: HOSPITAL | Age: 83
End: 2023-08-10

## 2023-08-10 VITALS
HEART RATE: 59 BPM | HEIGHT: 62.5 IN | TEMPERATURE: 208.04 F | DIASTOLIC BLOOD PRESSURE: 84 MMHG | OXYGEN SATURATION: 98 % | WEIGHT: 160.25 LBS | RESPIRATION RATE: 16 BRPM | SYSTOLIC BLOOD PRESSURE: 142 MMHG | BODY MASS INDEX: 28.75 KG/M2

## 2023-08-10 DIAGNOSIS — C71.9 MALIGNANT NEOPLASM OF BRAIN, UNSPECIFIED: ICD-10-CM

## 2023-08-10 DIAGNOSIS — L30.9 DERMATITIS, UNSPECIFIED: ICD-10-CM

## 2023-08-10 LAB
ALBUMIN SERPL ELPH-MCNC: 4.4 G/DL — SIGNIFICANT CHANGE UP (ref 3.3–5)
ALP SERPL-CCNC: 78 U/L — SIGNIFICANT CHANGE UP (ref 40–120)
ALT FLD-CCNC: 7 U/L — LOW (ref 10–45)
ANION GAP SERPL CALC-SCNC: 12 MMOL/L — SIGNIFICANT CHANGE UP (ref 5–17)
AST SERPL-CCNC: 28 U/L — SIGNIFICANT CHANGE UP (ref 10–40)
BASOPHILS # BLD AUTO: 0.03 K/UL — SIGNIFICANT CHANGE UP (ref 0–0.2)
BASOPHILS NFR BLD AUTO: 0.4 % — SIGNIFICANT CHANGE UP (ref 0–2)
BILIRUB SERPL-MCNC: 0.4 MG/DL — SIGNIFICANT CHANGE UP (ref 0.2–1.2)
BUN SERPL-MCNC: 28 MG/DL — HIGH (ref 7–23)
CALCIUM SERPL-MCNC: 9.8 MG/DL — SIGNIFICANT CHANGE UP (ref 8.4–10.5)
CHLORIDE SERPL-SCNC: 103 MMOL/L — SIGNIFICANT CHANGE UP (ref 96–108)
CO2 SERPL-SCNC: 26 MMOL/L — SIGNIFICANT CHANGE UP (ref 22–31)
CREAT SERPL-MCNC: 0.84 MG/DL — SIGNIFICANT CHANGE UP (ref 0.5–1.3)
EGFR: 69 ML/MIN/1.73M2 — SIGNIFICANT CHANGE UP
EOSINOPHIL # BLD AUTO: 0.19 K/UL — SIGNIFICANT CHANGE UP (ref 0–0.5)
EOSINOPHIL NFR BLD AUTO: 2.4 % — SIGNIFICANT CHANGE UP (ref 0–6)
GLUCOSE SERPL-MCNC: 212 MG/DL — HIGH (ref 70–99)
HCT VFR BLD CALC: 38 % — SIGNIFICANT CHANGE UP (ref 34.5–45)
HGB BLD-MCNC: 12.6 G/DL — SIGNIFICANT CHANGE UP (ref 11.5–15.5)
IMM GRANULOCYTES NFR BLD AUTO: 0.5 % — SIGNIFICANT CHANGE UP (ref 0–0.9)
LYMPHOCYTES # BLD AUTO: 2.36 K/UL — SIGNIFICANT CHANGE UP (ref 1–3.3)
LYMPHOCYTES # BLD AUTO: 29.2 % — SIGNIFICANT CHANGE UP (ref 13–44)
MCHC RBC-ENTMCNC: 32 PG — SIGNIFICANT CHANGE UP (ref 27–34)
MCHC RBC-ENTMCNC: 33.2 G/DL — SIGNIFICANT CHANGE UP (ref 32–36)
MCV RBC AUTO: 96.4 FL — SIGNIFICANT CHANGE UP (ref 80–100)
MONOCYTES # BLD AUTO: 0.47 K/UL — SIGNIFICANT CHANGE UP (ref 0–0.9)
MONOCYTES NFR BLD AUTO: 5.8 % — SIGNIFICANT CHANGE UP (ref 2–14)
NEUTROPHILS # BLD AUTO: 4.99 K/UL — SIGNIFICANT CHANGE UP (ref 1.8–7.4)
NEUTROPHILS NFR BLD AUTO: 61.7 % — SIGNIFICANT CHANGE UP (ref 43–77)
NRBC # BLD: 0 /100 WBCS — SIGNIFICANT CHANGE UP (ref 0–0)
PLATELET # BLD AUTO: 296 K/UL — SIGNIFICANT CHANGE UP (ref 150–400)
POTASSIUM SERPL-MCNC: 5 MMOL/L — SIGNIFICANT CHANGE UP (ref 3.5–5.3)
POTASSIUM SERPL-SCNC: 5 MMOL/L — SIGNIFICANT CHANGE UP (ref 3.5–5.3)
PROT SERPL-MCNC: 6.8 G/DL — SIGNIFICANT CHANGE UP (ref 6–8.3)
RBC # BLD: 3.94 M/UL — SIGNIFICANT CHANGE UP (ref 3.8–5.2)
RBC # FLD: 12.1 % — SIGNIFICANT CHANGE UP (ref 10.3–14.5)
SODIUM SERPL-SCNC: 140 MMOL/L — SIGNIFICANT CHANGE UP (ref 135–145)
T4 FREE+ TSH PNL SERPL: 1 UIU/ML — SIGNIFICANT CHANGE UP (ref 0.27–4.2)
WBC # BLD: 8.08 K/UL — SIGNIFICANT CHANGE UP (ref 3.8–10.5)
WBC # FLD AUTO: 8.08 K/UL — SIGNIFICANT CHANGE UP (ref 3.8–10.5)

## 2023-08-10 PROCEDURE — 99213 OFFICE O/P EST LOW 20 MIN: CPT

## 2023-08-10 NOTE — PHYSICAL EXAM
[Ambulatory and capable of all self care but unable to carry out any work activities] : Status 2- Ambulatory and capable of all self care but unable to carry out any work activities. Up and about more than 50% of waking hours [Normal] : affect appropriate [de-identified] : Elderly woman NAD [de-identified] : No icterus  [de-identified] : MMM O/P Clear  [de-identified] : No LAD  [de-identified] : Clear  [de-identified] : S1 S2 [de-identified] : LE edema resolved [de-identified] : No spine/CVA tenderness [de-identified] : Scattered erythema secondary to scratching. No rash noted

## 2023-08-10 NOTE — ASSESSMENT
[FreeTextEntry1] : Metastatic NSCLC with adenocarcinoma histology; tumor tested PDL1 Negative and lacks actionable mutations but tested TMB-High.  Treated with GK-SRS for brain metastases in Sept 2022.  Started first line single agent Pembrolizumab in Oct 2022; achieved [place].  Restaging Brain MRI June 2023 with sustained intracranial response.   Restaging PET/CT June 2023 with sustained response.   Recommend: - Continue with first-line systemic therapy with single agent Pembrolizumab for as long as it benefits the patient.  Scheduled for treatment later this week.  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  Monitor.  - Brain Metastases:  F/U with Radiation oncology s/p GK-SRS to brain metastases.  Follows with Neuro-Onc as well. Keppra increased to 1000mg bid during 7/16-18 hospitalization given clinical event though no seizure activity on EEG. Has f/u with Dr. Hauser later this month. -Continue f/u with Palliative Care  - PE: Continue Eliquis anticoagulation indefinitely given PE and Afib. - Continue follow-up and management with her PCP/cardiologist for her other ongoing medical problems and comorbidities. -Constipation: start MiraLAX daily -Patient will consider referral to psychologist here at [facility] -Back pain: Pt with h/o spinal stenosis.  Now s/p Epidural with Dr. Ryder Salazar ([phone]). Follows with Pall Care.  Takes gabapentin, hydromorphone and medical cannabis.  -Iron deficiency: Can discontinue given constipation. Monitor hemaglobin -Pruritus:  request for Derm consultation.   -Follow up prior to each cycle.  Plan to obtain her next restaging body scan in OCTOBER if she remains clinically stable

## 2023-08-10 NOTE — HISTORY OF PRESENT ILLNESS
[Disease: _____________________] : Disease: [unfilled] [AJCC Stage: ____] : AJCC Stage: [unfilled] [de-identified] : 82F former smoker with multiple comorbidities was undergoing orthopedic evaluation as an outpatient due to ongoing chronic back pain and gait imbalance.  She ultimately presented to [facility] ED on 7/19/2022.  Brain imaging demonstrated evidence of metastatic disease including a dominant right temporal lobe 3.1 cm enhancing mass with vasogenic edema.  CT scan of her body demonstrated a ANTHONY tumor with subcentimeter pulmonary nodules and a nonspecific adrenal nodule.  Bronchoscopy with biopsy performed on 7/20/2022 demonstrated adenocarcinoma consistent with lung primary; tumor tested PD-L1 negative and was negative for actionable mutations. however, tested TMB-High.  She underwent craniotomy resection of the dominant brain mass on 7/27/2022 with pathology revealing the same findings.  Hospital course was complicated by NSTEMI, Afib with RVR and RML PE.  She was put on medications for seizure prophylaxis.  She was subsequently discharged to acute rehab on 8/10-8/26/2022.  Treated with GK-SRS to brain metastases in Sept 2022. Started 1st line single agent Pembro in Oct 2022; achieved [place].  [de-identified] : - Left mainstem bronchus biopsy 7/20/2022: Poorly differentiated adenocarcinoma of lung origin.\par  PD-L1 negative/0%.\par  Foundation One:  No actionable mutations (TP53+, among others); TMB-High.  \par  -Right brain tumor resection 7/27/2022: Metastatic non-small cell carcinoma, favor adenocarcinoma, consistent with lung origin.\par  PD-L1 negative. [de-identified] : Patient started first-line single agent Pembrolizumab in Oct 2022; achieved [place].  Patient presents today with her son and caregiver. She reports compliance with increased dose of Keppra 1000mg bid and denies seizure activity. She continues with pruritis without rash for which she uses calamine lotion with relief. Her breathing is stable. Offers no other complaints with regards to her cancer diagnosis or treatment. Her most prominent issue is back pain; she is s/p two epidural injections with short lived relief after the first and virtually no relief after the second. She is utilizing hydromorphone tid prn from an outside physician. She states that she is experiencing constipation and is currently taking Miralax prn; discussed taking this daily.  She continues gabapentin. Reports compliance with Eliquis. Denies N/V/D/C. She does admit to periods of sadness and will consider if she would like to speak to our psychologist here at [facility].

## 2023-08-10 NOTE — RESULTS/DATA
[FreeTextEntry1] : -CT Brain 7/18/22:  Cystic versus a necrotic masses measure approximately 4.8 x 3.2 x 3.2 cm in the right temporal lobe and 1.4 x 1.3 x 1.6 cm in the medial left frontal lobe.  Moderate to large amount of vasogenic edema in the right frontal, parietal and temporal lobes. Trace edema in the left frontal lobe.  Regional mass effect in the right cerebral hemisphere with partial effacement of the ventricular system and 5 mm subfalcine herniation of the right frontal lobe underneath the cerebral falx.  The findings may represent intracranial metastatic disease versus glioblastoma. Contrast-enhanced brain MRI is recommended for further evaluation.  -CT C/A/P 7/18/22:  Left upper lobe lung mass with partial atelectasis of the left upper lobe, concerning for primary lung neoplasm. Few scattered sub-4 mm pulmonary nodules, indeterminate.  Indeterminant left adrenal nodule for which contrast enhanced MRI is recommended for further evaluation.  -MRI Brain 7/20/22:  Right temporal lobe peripherally enhancing, centrally necrotic 3.1 x 2.6 cm mass demonstrating mild diffusion restriction. There is surrounding vasogenic edema. 8 mm leftward midline shift. Right uncal herniation. Additional left parafalcine 1.3 x 0.9 cm rim-enhancing cystic lesion with mild surrounding vasogenic edema. Tiny 0.3 x 0.3 cm rim-enhancing lesion within the left frontal cortex. Findings are concerning for metastatic disease in the context of patient's known primary lung cancer. The left lateral ventricle is mildly dilated.  -LE Dopplers 7/24/22:  No evidence of deep venous thrombosis in either lower extremity. -LE Dopplers 7/28/22:  No evidence of deep venous thrombosis in either lower extremity.  -CT Chest Angio 8/3/22:  Pulmonary embolism in the lateral segmental branch of the right middle lobe. No evidence of right heart strain.  Interval decrease in left upper lobe mass.  Unchanged obstruction of the posterior bronchus, and stable fibroglandular lymph node.  -Abdominal U/S 8/4/22:   Mildly distended gallbladder. No evidence of acute cholecystitis.  Possible mild hydronephrosis.  -LE Dopplers 8/7/22:  No evidence of deep venous thrombosis in either lower extremity.  -LE Dopplers 9/1/22:  No evidence of deep venous thrombosis in either lower extremity.  -PET/CT 9/9/22:  Abnormal FDG-PET/CT scan. 1. FDG-avid left upper lobe lung mass abutting the mediastinum at level of AP window, with associated atelectasis along the mediastinum, unchanged as compared to CT dated 8/3/2022, corresponds to patient's known adenocarcinoma. 2. Right temporal craniotomy with photopenic low attenuation within right temporal lobe, compatible with vasogenic edema, gliosis, and/or postsurgical changes, better evaluated on contrast-enhanced CT or MRI. 3. Findings suggestive of left vocal cord paralysis, likely secondary to left upper lobe lung mass. Please correlate clinically and with direct visualization, as indicated. 4. Nonspecific hypermetabolism in small bowel and colon likely is related to metformin. 5. No scan evidence of metastatic disease.  -PET/CT 12/8/22:  1. FDG-avid left upper lobe lung nodule is decreased in size and metabolism, compatible with a partial response to interval therapy. Interval decrease in adjacent left upper lobe paramediastinal opacity, likely atelectasis, which is unchanged in metabolism. 2. Right temporal craniotomy with photopenic low attenuation within right temporal lobe, compatible with vasogenic edema, gliosis, and/or postsurgical changes, better evaluated on contrast-enhanced CT or MRI, not significantly changed. 3. Findings suggestive of left vocal cord paralysis, likely secondary to left upper lobe lung mass, unchanged. 4. Nonspecific hypermetabolism in small bowel and colon, unchanged, likely related to metformin. 5. No scan evidence of metastatic disease.  -MRI 12/8/22:  Interval decrease in size and peripheral enhancement of the medial left frontal lesion. Smaller ring-enhancing lesion more laterally along the left frontal cortex no longer visualized.  No new lesions seen.  Postsurgical cavity right temporal lobe with mild adjacent enhancement. Continued follow-up recommended.  -MRI Brain 2/23/23:  Stable medial left frontal lobe nonenhancing cystic lesion. Stable linear enhancement medial to the right temporal lobe surgical cavity. No new areas abnormal enhancement. Continued follow-up is recommended  -PET/CT 3/2/23:  Compared to FDG PET/CT dated 12/8/2022: 1. FDG-avid left upper lobe lung nodule is not significantly changed in size or metabolism, compatible with stable disease. Interval decrease in size and metabolism of adjacent left upper lobe opacity abutting the mediastinum. 2. Nonspecific FDG-avid skin thickening, bilateral mid/lower anterior abdominal wall with new associated subcutaneous stranding, and increased in metabolism, likely secondary to inflammation. Please correlate clinically. 3. Right temporal craniotomy with photopenic low attenuation within right temporal lobe, compatible with vasogenic edema, gliosis, and/or postsurgical changes, better evaluated on contrast-enhanced CT or MRI, not significantly changed. 4. Nonspecific pancolonic hypermetabolism, not significantly changed, likely is related to metformin. 5. No scan evidence of metastatic disease.  Images Reviewed/Interpreted:  -MRI Brain 6/15/23:  Stable right temporal postoperative changes. Stable subcentimeter focus of enhancement along the medial aspect of the cavity, likely posttreatment changes. No new or worsening enhancement identified.  -PET/CT 6/22/23:  Compared to FDG PET/CT dated 3/2/2023: 1. Mildly FDG-avid left upper lobe lung nodule is not significantly changed in size or metabolism, compatible with stable disease. A minimally FDG-avid adjacent left upper lobe opacity abutting the mediastinum is decreased in size and unchanged and avidity. 2. Right temporal craniotomy with photopenic low attenuation within right temporal lobe, compatible with posttreatment changes, unchanged. 3. Nonspecific diffuse distal small bowel hypermetabolism, increased as compared to prior study, and pancolonic hypermetabolism, decreased as compared to prior study, without corresponding abnormalities on CT, likely are related to metformin. 4. Nonspecific mildly FDG-avid skin thickening and subcutaneous stranding, bilateral mid/lower anterior abdominal wall, decreased as compared to prior study. Please correlate clinically. 5. Remainder of study demonstrates no evidence of FDG-avid disease.  In hospital: -MRI Brain 7/16/23: No significant interval change from 6/15/2023.  Similar-appearing postsurgical encephalomalacia and gliosis in the right mid temporal lobe. Similar 5 mm focus of enhancement along the mesial aspect of the surgical cavity, likely postsurgical in nature. No new abnormal parenchymal or leptomeningeal enhancement. Similar nonspecific T2 and FLAIR hyperintense signal surrounding the surgical cavity, likely representing gliosis.  -CT PERFUSION 7/16/23: Perfusion abnormality localized to hypodense region in the temporal lobe, related to postoperative changes rather than true perfusion defect. No evidence of core infarct or ischemic penumbra otherwise.  -CT ANGIOGRAPHY NECK 7/16/23: Patent vasculature without occlusion. Changes of prior right carotid endarterectomy. Marked narrowing and luminal irregularity of the proximal right internal carotid artery just beyond the origin with limited assessment of stenosis by NASCET criteria given the overall diminutive caliber to the cervical right internal carotid artery. Moderate stenosis origin left internal carotid artery by NASCET criteria. No critical stenosis or dissection.  -CT ANGIOGRAPHY BRAIN 7/16/23: No vessel occlusion, flow-limiting stenosis or aneurysm. NASCET CAROTID STENOSIS CRITERIA (distal normal appearing ICA as denominator for measurement): 0%-none, 1-49%-mild, 50-70%-moderate, 70-89%-severe, 90-99%-critical.

## 2023-08-24 ENCOUNTER — APPOINTMENT (OUTPATIENT)
Dept: NEUROLOGY | Facility: CLINIC | Age: 83
End: 2023-08-24
Payer: MEDICARE

## 2023-08-24 VITALS
HEART RATE: 74 BPM | TEMPERATURE: 98.3 F | DIASTOLIC BLOOD PRESSURE: 72 MMHG | BODY MASS INDEX: 28.71 KG/M2 | OXYGEN SATURATION: 95 % | HEIGHT: 62.5 IN | WEIGHT: 160 LBS | SYSTOLIC BLOOD PRESSURE: 125 MMHG | RESPIRATION RATE: 16 BRPM

## 2023-08-24 PROCEDURE — 99215 OFFICE O/P EST HI 40 MIN: CPT

## 2023-08-24 NOTE — DISCUSSION/SUMMARY
[FreeTextEntry1] : Episodic aphasia.  She has vascular risk (albeit on eliquis,however also history of carotid disease and smoking), and interictal spikes on EEG at time of surgery but on RIGHT side.  Left sided lesions seem unlikely to be cause of symptoms given size and edema. MRI head 2/23 stable. Seizures do not seem to be etiology for the aphasia.  Semiology seems to be seizures, perhaps she is right dominant.  It is also possible that she is having TIAs as she did not start the asa.    APHASIA: Continue eliquis, if persist on increased keppra will start asa 81.  LDL at goal.   POSSIBLE EPILEPSY: prescribed increased keppra to 1000 bid ER  BRAIN METS: Next MRI in 10/23, coordinated through Dr. Funes office  PAIN: at her request I refilled her GBP prescription  RTC 2mo

## 2023-08-24 NOTE — DATA REVIEWED
[de-identified] : I reviewed pre and post oeprative scans from 7/22 showing resection of right temporal tumor, and SRS planning scan 9/22 showing cystic left frontal parasag lesion and small left frontal lateral lesion, both improved on 12/22 MRI without edema, stable without new lesion in 2/23, 6/23, 7/23 MRI. [de-identified] : US bilateral carotid doppler 02/23 reveals bilateral 50-69% internal carotid artery stenosis.

## 2023-08-24 NOTE — HISTORY OF PRESENT ILLNESS
[FreeTextEntry1] : NEURO-ONCOLOGY  This 82 year old right handed woman is seen in follow up for evaluation and management of episodic aphasia  She has NSCLC.  She presented with headaches, and was found to have a dominant right temporal and two small left frontal brain metastases.  The dominant lesion was resected by Dr. Loomis in 7/22, followed by SRS to cavity and two small lesions in left frontal lobe in 9/22 with good response.  She started pembrolizumab under Dr. Lay's care. EEG at time of diagnosis with RIGHT temporal sharps.   She has had multiple episodes of transient expressive aphasia lasting 5-10 minutes, in 9/22, 10/22 and 1/23 and 3/23. One occurred off eliquis, but the others occurred on eliquis and keppra. She's been on keppra since surgery. She had no language issues following surgery, but does note that her handwriting (right hand) has become worse.  She's had intermittent headaches. US carotid dopplers reveal 50-69% bilateral internal carotid artery stenosis.   INTERVAL HISTORY: Though I had spoken to cardiologist about starting ASA for possible TIAs from LICA stenosis (in addition to eliquis) she did not.  She was admitted in 7/23 with episode of aphasia.  EEG with right temporal slowing, MRI stable.  She is back at baseline.  Though she was prescribed keppra XR 1000 bid, she did not ever get it from pharmacy.  She remains on 750 bid.  No further spells. Main complaint is back pain, chronic for which she's on GBP.   PMH:  Cardiac stents in 1990s.  MI.  PE in 8/22.  Afib listed in chart, but cardiologist denies she had it,  PSH:  CEA on RIGHT.   SHX:  Office work.  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  no etoh. here with son and aide.

## 2023-08-31 ENCOUNTER — NON-APPOINTMENT (OUTPATIENT)
Age: 83
End: 2023-08-31

## 2023-08-31 ENCOUNTER — APPOINTMENT (OUTPATIENT)
Dept: HEMATOLOGY ONCOLOGY | Facility: CLINIC | Age: 83
End: 2023-08-31
Payer: MEDICARE

## 2023-08-31 ENCOUNTER — RESULT REVIEW (OUTPATIENT)
Age: 83
End: 2023-08-31

## 2023-08-31 ENCOUNTER — APPOINTMENT (OUTPATIENT)
Dept: INFUSION THERAPY | Facility: HOSPITAL | Age: 83
End: 2023-08-31

## 2023-08-31 VITALS
TEMPERATURE: 97 F | HEART RATE: 76 BPM | DIASTOLIC BLOOD PRESSURE: 74 MMHG | SYSTOLIC BLOOD PRESSURE: 115 MMHG | RESPIRATION RATE: 16 BRPM | BODY MASS INDEX: 28.33 KG/M2 | WEIGHT: 157.41 LBS | OXYGEN SATURATION: 94 %

## 2023-08-31 LAB
ALBUMIN SERPL ELPH-MCNC: 4.3 G/DL — SIGNIFICANT CHANGE UP (ref 3.3–5)
ALP SERPL-CCNC: 74 U/L — SIGNIFICANT CHANGE UP (ref 40–120)
ALT FLD-CCNC: 7 U/L — LOW (ref 10–45)
ANION GAP SERPL CALC-SCNC: 14 MMOL/L — SIGNIFICANT CHANGE UP (ref 5–17)
AST SERPL-CCNC: 22 U/L — SIGNIFICANT CHANGE UP (ref 10–40)
BASOPHILS # BLD AUTO: 0.03 K/UL — SIGNIFICANT CHANGE UP (ref 0–0.2)
BASOPHILS NFR BLD AUTO: 0.5 % — SIGNIFICANT CHANGE UP (ref 0–2)
BILIRUB SERPL-MCNC: 0.4 MG/DL — SIGNIFICANT CHANGE UP (ref 0.2–1.2)
BUN SERPL-MCNC: 24 MG/DL — HIGH (ref 7–23)
CALCIUM SERPL-MCNC: 9.8 MG/DL — SIGNIFICANT CHANGE UP (ref 8.4–10.5)
CHLORIDE SERPL-SCNC: 102 MMOL/L — SIGNIFICANT CHANGE UP (ref 96–108)
CO2 SERPL-SCNC: 24 MMOL/L — SIGNIFICANT CHANGE UP (ref 22–31)
CREAT SERPL-MCNC: 0.86 MG/DL — SIGNIFICANT CHANGE UP (ref 0.5–1.3)
EGFR: 67 ML/MIN/1.73M2 — SIGNIFICANT CHANGE UP
EOSINOPHIL # BLD AUTO: 0.19 K/UL — SIGNIFICANT CHANGE UP (ref 0–0.5)
EOSINOPHIL NFR BLD AUTO: 3.1 % — SIGNIFICANT CHANGE UP (ref 0–6)
GLUCOSE SERPL-MCNC: 310 MG/DL — HIGH (ref 70–99)
HCT VFR BLD CALC: 38 % — SIGNIFICANT CHANGE UP (ref 34.5–45)
HGB BLD-MCNC: 12.9 G/DL — SIGNIFICANT CHANGE UP (ref 11.5–15.5)
IMM GRANULOCYTES NFR BLD AUTO: 0.3 % — SIGNIFICANT CHANGE UP (ref 0–0.9)
LYMPHOCYTES # BLD AUTO: 1.97 K/UL — SIGNIFICANT CHANGE UP (ref 1–3.3)
LYMPHOCYTES # BLD AUTO: 31.9 % — SIGNIFICANT CHANGE UP (ref 13–44)
MCHC RBC-ENTMCNC: 32.2 PG — SIGNIFICANT CHANGE UP (ref 27–34)
MCHC RBC-ENTMCNC: 33.9 G/DL — SIGNIFICANT CHANGE UP (ref 32–36)
MCV RBC AUTO: 94.8 FL — SIGNIFICANT CHANGE UP (ref 80–100)
MONOCYTES # BLD AUTO: 0.43 K/UL — SIGNIFICANT CHANGE UP (ref 0–0.9)
MONOCYTES NFR BLD AUTO: 7 % — SIGNIFICANT CHANGE UP (ref 2–14)
NEUTROPHILS # BLD AUTO: 3.54 K/UL — SIGNIFICANT CHANGE UP (ref 1.8–7.4)
NEUTROPHILS NFR BLD AUTO: 57.2 % — SIGNIFICANT CHANGE UP (ref 43–77)
NRBC # BLD: 0 /100 WBCS — SIGNIFICANT CHANGE UP (ref 0–0)
PLATELET # BLD AUTO: 260 K/UL — SIGNIFICANT CHANGE UP (ref 150–400)
POTASSIUM SERPL-MCNC: 4.6 MMOL/L — SIGNIFICANT CHANGE UP (ref 3.5–5.3)
POTASSIUM SERPL-SCNC: 4.6 MMOL/L — SIGNIFICANT CHANGE UP (ref 3.5–5.3)
PROT SERPL-MCNC: 6.6 G/DL — SIGNIFICANT CHANGE UP (ref 6–8.3)
RBC # BLD: 4.01 M/UL — SIGNIFICANT CHANGE UP (ref 3.8–5.2)
RBC # FLD: 11.7 % — SIGNIFICANT CHANGE UP (ref 10.3–14.5)
SODIUM SERPL-SCNC: 140 MMOL/L — SIGNIFICANT CHANGE UP (ref 135–145)
WBC # BLD: 6.18 K/UL — SIGNIFICANT CHANGE UP (ref 3.8–10.5)
WBC # FLD AUTO: 6.18 K/UL — SIGNIFICANT CHANGE UP (ref 3.8–10.5)

## 2023-08-31 PROCEDURE — 99214 OFFICE O/P EST MOD 30 MIN: CPT

## 2023-08-31 NOTE — PHYSICAL EXAM
[Ambulatory and capable of all self care but unable to carry out any work activities] : Status 2- Ambulatory and capable of all self care but unable to carry out any work activities. Up and about more than 50% of waking hours [Normal] : affect appropriate [de-identified] : Elderly woman NAD [de-identified] : No icterus  [de-identified] : MMM O/P Clear  [de-identified] : No LAD  [de-identified] : Clear  [de-identified] : S1 S2 [de-identified] : LE edema resolved [de-identified] : No spine/CVA tenderness [de-identified] : Scattered erythema secondary to scratching. No rash noted

## 2023-08-31 NOTE — ASSESSMENT
[FreeTextEntry1] : Metastatic NSCLC with adenocarcinoma histology; tumor tested PDL1 Negative and lacks actionable mutations but tested TMB-High.  Treated with GK-SRS for brain metastases in Sept 2022.  Started first line single agent Pembrolizumab in Oct 2022; achieved [place].  Restaging Brain MRI June 2023 with sustained intracranial response.   Restaging PET/CT June 2023 with sustained response.   Recommend: - Continue with first-line systemic therapy with single agent Pembrolizumab for as long as it benefits the patient.  Scheduled for treatment later this week.  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  Monitor.  - Brain Metastases:  F/U with Radiation oncology s/p GK-SRS to brain metastases.  Follows with Neuro-Onc as well. Keppra increased to 1000mg bid during 7/16-18 hospitalization given clinical event though no seizure activity on EEG: she did not start increased dosage and remains on Keppra 750mg bid with no further events. -Continue f/u with Palliative Care for medical cannabis dosing recommendation.  - PE: Continue Eliquis anticoagulation indefinitely given PE and Afib. - Continue follow-up and management with her PCP/cardiologist for her other ongoing medical problems and comorbidities. -Constipation: MiraLAX daily -Patient will consider referral to psychologist here at [facility] -Back pain: Pt with h/o spinal stenosis.  Now s/p Epidural with Dr. Ryder Salazar ([phone]). Follows with Pall Care.  Takes gabapentin, hydrocodone/acetaminophen 7.5mg-325mg.  Medical cannabis not effective at initial dosage; encouraged follow up for recommendations.  -Iron deficiency: Can discontinue given constipation. Monitor hemaglobin -Follow up prior to each cycle.  Plan to obtain her next restaging body scan in OCTOBER (req given) if she remains clinically stable.

## 2023-08-31 NOTE — HISTORY OF PRESENT ILLNESS
[Disease: _____________________] : Disease: [unfilled] [AJCC Stage: ____] : AJCC Stage: [unfilled] [de-identified] : 82F former smoker with multiple comorbidities was undergoing orthopedic evaluation as an outpatient due to ongoing chronic back pain and gait imbalance.  She ultimately presented to [facility] ED on 7/19/2022.  Brain imaging demonstrated evidence of metastatic disease including a dominant right temporal lobe 3.1 cm enhancing mass with vasogenic edema.  CT scan of her body demonstrated a ANTHONY tumor with subcentimeter pulmonary nodules and a nonspecific adrenal nodule.  Bronchoscopy with biopsy performed on 7/20/2022 demonstrated adenocarcinoma consistent with lung primary; tumor tested PD-L1 negative and was negative for actionable mutations. however, tested TMB-High.  She underwent craniotomy resection of the dominant brain mass on 7/27/2022 with pathology revealing the same findings.  Hospital course was complicated by NSTEMI, Afib with RVR and RML PE.  She was put on medications for seizure prophylaxis.  She was subsequently discharged to acute rehab on 8/10-8/26/2022.  Treated with GK-SRS to brain metastases in Sept 2022. Started 1st line single agent Pembro in Oct 2022; achieved [place].  [de-identified] : - Left mainstem bronchus biopsy 7/20/2022: Poorly differentiated adenocarcinoma of lung origin.\par  PD-L1 negative/0%.\par  Foundation One:  No actionable mutations (TP53+, among others); TMB-High.  \par  -Right brain tumor resection 7/27/2022: Metastatic non-small cell carcinoma, favor adenocarcinoma, consistent with lung origin.\par  PD-L1 negative. [de-identified] : Patient started first-line single agent Pembrolizumab in Oct 2022; achieved [place].  Since her last visit she saw Dr. Hauser and though he had spoken to her cardiologist about starting ASA for possible TIAs from LICA stenosis (in addition to Eliquis) she did not. She was admitted in 7/23 with episode of aphasia. EEG with right temporal slowing, MRI stable. She is now back at baseline. Though she was prescribed keppra XR 1000 bid, she did not ever get it from pharmacy. She remains on 750 bid. No further spells.  She has an MRI Brain scheduled for Oct 2023.   Patient presents today with her son and caregiver. Most bothersome is her back and leg pain for which she has had two epidural injections without relief. She is utilizing Hydrocodone/acetaminophen 7.5mg-325mg tid as per outside pain management physician Dr. Salazar; they have follow up with him next week. Otherwise feeling stable. Her appetite continues to be low, with a 3 lb weight loss; discussed eating out of habit and including Ensure for nutritional supplementation. Discussed follow up with Dr. Lj Adamson for adjustment of medical cannabis that does not seem to be helping with appetite or pain.  She reports compliance with Keppra 750 mg bid and denies seizure activity. Constipation and is managed with MiraLAX daily.  She continues gabapentin. Reports compliance with Eliquis. Denies N/V/F/C.

## 2023-09-05 NOTE — HISTORY OF PRESENT ILLNESS
[de-identified] : LANIE BERTRAND is a 83 year old female who presents to the [facility] for follow up of her left vocal fold paralysis, lung cancer, dysphonia, and dysphagia.  I last saw the patient on 1/5/23. As she had poor pain tolerance of the scope on prior visit I did not think she would be a good candidate for injection laryngoplasty in the office. She was offered other options but elected for observation.  Previously reported:  dysphonia and known left vocal fold immobility. States her voice changed after her prior craniotomy July 2022. States her voice will vary between normal to hoarse depending on the amount she speaks. Reports this does not bother her. She has a history of left upper lobe lung adenocarcinoma currently getting Pembrolizumab. Also has history of brain mets s/p craniotomy and resection of right temporal lesion on 7/27/2022.    Patient is referred by Dr. Block who saw the patient on 12/22/22 and first identified vocal fold immobility. He referred to me for possible injection laryngoplasty.    PET scan completed 12/8/22. I have reviewed these images and agree with the report.    Denies dysphagia (expect to large pills), odynophagia, dyspnea.  Denies regurgitation of recently eaten foods.  Denies recent weight loss.  Denies frequent classic reflux symptoms.

## 2023-09-07 ENCOUNTER — APPOINTMENT (OUTPATIENT)
Dept: OTOLARYNGOLOGY | Facility: CLINIC | Age: 83
End: 2023-09-07

## 2023-09-08 ENCOUNTER — RESULT REVIEW (OUTPATIENT)
Age: 83
End: 2023-09-08

## 2023-09-12 ENCOUNTER — OUTPATIENT (OUTPATIENT)
Dept: OUTPATIENT SERVICES | Facility: HOSPITAL | Age: 83
LOS: 1 days | Discharge: HOME | End: 2023-09-12

## 2023-09-12 DIAGNOSIS — I25.10 ATHEROSCLEROTIC HEART DISEASE OF NATIVE CORONARY ARTERY WITHOUT ANGINA PECTORIS: Chronic | ICD-10-CM

## 2023-09-12 DIAGNOSIS — I73.9 PERIPHERAL VASCULAR DISEASE, UNSPECIFIED: Chronic | ICD-10-CM

## 2023-09-12 DIAGNOSIS — C34.90 MALIGNANT NEOPLASM OF UNSPECIFIED PART OF UNSPECIFIED BRONCHUS OR LUNG: ICD-10-CM

## 2023-09-14 NOTE — PROGRESS NOTE ADULT - PROBLEM SELECTOR PLAN 1
62M PMH of DM2, HTN presents with fall, right sided weakness, dysarthria and right facial droop found to have LM1 occlusion s/p TNK in ER subsequent mechanical thrombectomy and decompressive craniectomy for midline shift. SAMARA negative for thrombus. Cardiology recommending ILR /MCOT as outpatient. Extubated on 9/1/23, monitored in ICU and transferred to medicine for further management.     #CVA + antiphospholipid syndrome (embolic stroke+ positive antiphospholipid antibody)- Started on Eliquis-   AC cleared by Neuro Sx and Hematology recommended Eliquis BID   s/p cranioplasty  c/w ASA, lipitor- Rehab/ Impaired mobility and function   SAMARA negative for thrombus, +atheroma of aortic arch and aorta   mcot/ILR on DC  pt/ot/speech/ PM&R- cont   s/p keppra  Cranioplasty with neurosx outpatient   CT head with revolving left MCA infarct.  Neurology following.    Klebsiella Bacteremia  leukocytosis slowly improving.   cont Ceftriaxone  f/u blood cx from 9/11 - negative to date  ID consulted.    #urinary retention longo   Flomax  0.8mg po daily  bowel regimen  TOV near discharge vs. ALDEN     #Hematuria- some yesterday will - cont monitor on Eliquis   reached out to regina Sanchez - heme onc with original recommendations - regards switching to heparin   last night had an episode of hematuria   and prior   pt noted with 1 episode of hematuria on 9/7, suspect 2/2 traumatic Longo  H/H stable    #Constipation  miralax/senna    #Dysphagia  pureed diet with thick liquids  aspiration precautions      #Uneven BP   CT angio of the neck and A/P did not show any acute pathologies  likely chronic atherosclerosis disease    #Hypernatremia -resolved  PO fluid intake    #Type 2DM  sliding scale    #Elevated LFTs  Abdominal US ordered.  Repeat CMP in am.   Will adjust Lantus to be 28u at bedtime  Will continue 16/12/16 units before each meal as well as coverage scale. Will continue monitoring FS and FU, will adjust dose as steroids are tapered/dc.  Discussed plan with patient, counseled for compliance with consistent low carb diet and exercise as tolerated outpatient. Postoperatively, suggest to keep on the following DM regimen:  -Lantus 28u at bedtime  -Admelog 8/6/8 units before meals as well as coverage scale.  Will continue monitoring FS and FU, will adjust dose as steroids are tapered/dc.  Discussed plan with patient, counseled for compliance with consistent low carb diet and exercise as tolerated outpatient.

## 2023-09-21 ENCOUNTER — RESULT REVIEW (OUTPATIENT)
Age: 83
End: 2023-09-21

## 2023-09-21 ENCOUNTER — APPOINTMENT (OUTPATIENT)
Dept: INFUSION THERAPY | Facility: HOSPITAL | Age: 83
End: 2023-09-21

## 2023-09-21 ENCOUNTER — APPOINTMENT (OUTPATIENT)
Dept: HEMATOLOGY ONCOLOGY | Facility: CLINIC | Age: 83
End: 2023-09-21
Payer: MEDICARE

## 2023-09-21 VITALS
SYSTOLIC BLOOD PRESSURE: 109 MMHG | WEIGHT: 157.92 LBS | HEIGHT: 62.5 IN | TEMPERATURE: 97.3 F | HEART RATE: 67 BPM | DIASTOLIC BLOOD PRESSURE: 73 MMHG | BODY MASS INDEX: 28.33 KG/M2 | RESPIRATION RATE: 16 BRPM | OXYGEN SATURATION: 98 %

## 2023-09-21 LAB
ALT FLD-CCNC: 8 U/L — LOW (ref 10–45)
ALT FLD-CCNC: 8 U/L — LOW (ref 10–45)
BASOPHILS NFR BLD AUTO: 0.6 % — SIGNIFICANT CHANGE UP (ref 0–2)
BASOPHILS NFR BLD AUTO: 0.6 % — SIGNIFICANT CHANGE UP (ref 0–2)
BUN SERPL-MCNC: 19 MG/DL — SIGNIFICANT CHANGE UP (ref 7–23)
BUN SERPL-MCNC: 19 MG/DL — SIGNIFICANT CHANGE UP (ref 7–23)
EOSINOPHIL NFR BLD AUTO: 5.4 % — SIGNIFICANT CHANGE UP (ref 0–6)
EOSINOPHIL NFR BLD AUTO: 5.4 % — SIGNIFICANT CHANGE UP (ref 0–6)
HGB BLD-MCNC: 12.3 G/DL — SIGNIFICANT CHANGE UP (ref 11.5–15.5)
HGB BLD-MCNC: 12.3 G/DL — SIGNIFICANT CHANGE UP (ref 11.5–15.5)
MCHC RBC-ENTMCNC: 32.2 PG — SIGNIFICANT CHANGE UP (ref 27–34)
MCHC RBC-ENTMCNC: 32.2 PG — SIGNIFICANT CHANGE UP (ref 27–34)
NEUTROPHILS NFR BLD AUTO: 53.9 % — SIGNIFICANT CHANGE UP (ref 43–77)
NEUTROPHILS NFR BLD AUTO: 53.9 % — SIGNIFICANT CHANGE UP (ref 43–77)
PLATELET # BLD AUTO: 279 K/UL — SIGNIFICANT CHANGE UP (ref 150–400)
PLATELET # BLD AUTO: 279 K/UL — SIGNIFICANT CHANGE UP (ref 150–400)
POTASSIUM SERPL-MCNC: 4.1 MMOL/L — SIGNIFICANT CHANGE UP (ref 3.5–5.3)
POTASSIUM SERPL-MCNC: 4.1 MMOL/L — SIGNIFICANT CHANGE UP (ref 3.5–5.3)
POTASSIUM SERPL-SCNC: 4.1 MMOL/L — SIGNIFICANT CHANGE UP (ref 3.5–5.3)
POTASSIUM SERPL-SCNC: 4.1 MMOL/L — SIGNIFICANT CHANGE UP (ref 3.5–5.3)
RBC # BLD: 3.82 M/UL — SIGNIFICANT CHANGE UP (ref 3.8–5.2)
RBC # BLD: 3.82 M/UL — SIGNIFICANT CHANGE UP (ref 3.8–5.2)
SODIUM SERPL-SCNC: 142 MMOL/L — SIGNIFICANT CHANGE UP (ref 135–145)
SODIUM SERPL-SCNC: 142 MMOL/L — SIGNIFICANT CHANGE UP (ref 135–145)
T4 FREE+ TSH PNL SERPL: 0.93 UIU/ML — SIGNIFICANT CHANGE UP (ref 0.27–4.2)

## 2023-09-21 PROCEDURE — 99214 OFFICE O/P EST MOD 30 MIN: CPT

## 2023-09-22 DIAGNOSIS — Z51.11 ENCOUNTER FOR ANTINEOPLASTIC CHEMOTHERAPY: ICD-10-CM

## 2023-09-22 DIAGNOSIS — E05.90 THYROTOXICOSIS, UNSPECIFIED WITHOUT THYROTOXIC CRISIS OR STORM: ICD-10-CM

## 2023-10-05 ENCOUNTER — OUTPATIENT (OUTPATIENT)
Dept: OUTPATIENT SERVICES | Facility: HOSPITAL | Age: 83
LOS: 1 days | End: 2023-10-05
Payer: MEDICARE

## 2023-10-05 ENCOUNTER — APPOINTMENT (OUTPATIENT)
Dept: NUCLEAR MEDICINE | Facility: IMAGING CENTER | Age: 83
End: 2023-10-05
Payer: MEDICARE

## 2023-10-05 DIAGNOSIS — C34.12 MALIGNANT NEOPLASM OF UPPER LOBE, LEFT BRONCHUS OR LUNG: ICD-10-CM

## 2023-10-05 DIAGNOSIS — I25.10 ATHEROSCLEROTIC HEART DISEASE OF NATIVE CORONARY ARTERY WITHOUT ANGINA PECTORIS: Chronic | ICD-10-CM

## 2023-10-05 DIAGNOSIS — I73.9 PERIPHERAL VASCULAR DISEASE, UNSPECIFIED: Chronic | ICD-10-CM

## 2023-10-05 PROCEDURE — A9552: CPT

## 2023-10-05 PROCEDURE — 78815 PET IMAGE W/CT SKULL-THIGH: CPT | Mod: 26,PS,KX,MH

## 2023-10-05 PROCEDURE — 78815 PET IMAGE W/CT SKULL-THIGH: CPT

## 2023-10-17 ENCOUNTER — RESULT REVIEW (OUTPATIENT)
Age: 83
End: 2023-10-17

## 2023-10-17 ENCOUNTER — APPOINTMENT (OUTPATIENT)
Dept: HEMATOLOGY ONCOLOGY | Facility: CLINIC | Age: 83
End: 2023-10-17
Payer: MEDICARE

## 2023-10-17 ENCOUNTER — APPOINTMENT (OUTPATIENT)
Dept: INFUSION THERAPY | Facility: HOSPITAL | Age: 83
End: 2023-10-17

## 2023-10-17 VITALS
RESPIRATION RATE: 16 BRPM | HEART RATE: 82 BPM | WEIGHT: 158 LBS | BODY MASS INDEX: 28.44 KG/M2 | SYSTOLIC BLOOD PRESSURE: 125 MMHG | OXYGEN SATURATION: 99 % | DIASTOLIC BLOOD PRESSURE: 72 MMHG | TEMPERATURE: 96.8 F

## 2023-10-17 LAB
ALBUMIN SERPL ELPH-MCNC: 4.2 G/DL — SIGNIFICANT CHANGE UP (ref 3.3–5)
ALBUMIN SERPL ELPH-MCNC: 4.2 G/DL — SIGNIFICANT CHANGE UP (ref 3.3–5)
ALP SERPL-CCNC: 215 U/L — HIGH (ref 40–120)
ALP SERPL-CCNC: 215 U/L — HIGH (ref 40–120)
ALT FLD-CCNC: 39 U/L — SIGNIFICANT CHANGE UP (ref 10–45)
ALT FLD-CCNC: 39 U/L — SIGNIFICANT CHANGE UP (ref 10–45)
ANION GAP SERPL CALC-SCNC: 16 MMOL/L — SIGNIFICANT CHANGE UP (ref 5–17)
ANION GAP SERPL CALC-SCNC: 16 MMOL/L — SIGNIFICANT CHANGE UP (ref 5–17)
AST SERPL-CCNC: 55 U/L — HIGH (ref 10–40)
AST SERPL-CCNC: 55 U/L — HIGH (ref 10–40)
BILIRUB SERPL-MCNC: 0.4 MG/DL — SIGNIFICANT CHANGE UP (ref 0.2–1.2)
BILIRUB SERPL-MCNC: 0.4 MG/DL — SIGNIFICANT CHANGE UP (ref 0.2–1.2)
BUN SERPL-MCNC: 18 MG/DL — SIGNIFICANT CHANGE UP (ref 7–23)
BUN SERPL-MCNC: 18 MG/DL — SIGNIFICANT CHANGE UP (ref 7–23)
CALCIUM SERPL-MCNC: 9.4 MG/DL — SIGNIFICANT CHANGE UP (ref 8.4–10.5)
CALCIUM SERPL-MCNC: 9.4 MG/DL — SIGNIFICANT CHANGE UP (ref 8.4–10.5)
CHLORIDE SERPL-SCNC: 99 MMOL/L — SIGNIFICANT CHANGE UP (ref 96–108)
CHLORIDE SERPL-SCNC: 99 MMOL/L — SIGNIFICANT CHANGE UP (ref 96–108)
CO2 SERPL-SCNC: 25 MMOL/L — SIGNIFICANT CHANGE UP (ref 22–31)
CO2 SERPL-SCNC: 25 MMOL/L — SIGNIFICANT CHANGE UP (ref 22–31)
CREAT SERPL-MCNC: 0.84 MG/DL — SIGNIFICANT CHANGE UP (ref 0.5–1.3)
CREAT SERPL-MCNC: 0.84 MG/DL — SIGNIFICANT CHANGE UP (ref 0.5–1.3)
EGFR: 69 ML/MIN/1.73M2 — SIGNIFICANT CHANGE UP
EGFR: 69 ML/MIN/1.73M2 — SIGNIFICANT CHANGE UP
GLUCOSE SERPL-MCNC: 161 MG/DL — HIGH (ref 70–99)
GLUCOSE SERPL-MCNC: 161 MG/DL — HIGH (ref 70–99)
POTASSIUM SERPL-MCNC: 4.2 MMOL/L — SIGNIFICANT CHANGE UP (ref 3.5–5.3)
POTASSIUM SERPL-MCNC: 4.2 MMOL/L — SIGNIFICANT CHANGE UP (ref 3.5–5.3)
POTASSIUM SERPL-SCNC: 4.2 MMOL/L — SIGNIFICANT CHANGE UP (ref 3.5–5.3)
POTASSIUM SERPL-SCNC: 4.2 MMOL/L — SIGNIFICANT CHANGE UP (ref 3.5–5.3)
PROT SERPL-MCNC: 7 G/DL — SIGNIFICANT CHANGE UP (ref 6–8.3)
PROT SERPL-MCNC: 7 G/DL — SIGNIFICANT CHANGE UP (ref 6–8.3)
SODIUM SERPL-SCNC: 139 MMOL/L — SIGNIFICANT CHANGE UP (ref 135–145)
SODIUM SERPL-SCNC: 139 MMOL/L — SIGNIFICANT CHANGE UP (ref 135–145)

## 2023-10-17 PROCEDURE — 99214 OFFICE O/P EST MOD 30 MIN: CPT

## 2023-10-18 DIAGNOSIS — C34.11 MALIGNANT NEOPLASM OF UPPER LOBE, RIGHT BRONCHUS OR LUNG: ICD-10-CM

## 2023-11-02 ENCOUNTER — APPOINTMENT (OUTPATIENT)
Dept: MRI IMAGING | Facility: IMAGING CENTER | Age: 83
End: 2023-11-02
Payer: MEDICARE

## 2023-11-02 ENCOUNTER — OUTPATIENT (OUTPATIENT)
Dept: OUTPATIENT SERVICES | Facility: HOSPITAL | Age: 83
LOS: 1 days | End: 2023-11-02
Payer: MEDICARE

## 2023-11-02 ENCOUNTER — APPOINTMENT (OUTPATIENT)
Dept: NEUROLOGY | Facility: CLINIC | Age: 83
End: 2023-11-02
Payer: MEDICARE

## 2023-11-02 VITALS
RESPIRATION RATE: 16 BRPM | HEIGHT: 62.5 IN | OXYGEN SATURATION: 95 % | BODY MASS INDEX: 28.35 KG/M2 | SYSTOLIC BLOOD PRESSURE: 123 MMHG | WEIGHT: 158 LBS | DIASTOLIC BLOOD PRESSURE: 73 MMHG | HEART RATE: 67 BPM

## 2023-11-02 DIAGNOSIS — I73.9 PERIPHERAL VASCULAR DISEASE, UNSPECIFIED: Chronic | ICD-10-CM

## 2023-11-02 DIAGNOSIS — C79.31 SECONDARY MALIGNANT NEOPLASM OF BRAIN: ICD-10-CM

## 2023-11-02 DIAGNOSIS — I25.10 ATHEROSCLEROTIC HEART DISEASE OF NATIVE CORONARY ARTERY WITHOUT ANGINA PECTORIS: Chronic | ICD-10-CM

## 2023-11-02 PROCEDURE — A9585: CPT

## 2023-11-02 PROCEDURE — 70553 MRI BRAIN STEM W/O & W/DYE: CPT

## 2023-11-02 PROCEDURE — 99214 OFFICE O/P EST MOD 30 MIN: CPT

## 2023-11-02 PROCEDURE — 70553 MRI BRAIN STEM W/O & W/DYE: CPT | Mod: 26,MH

## 2023-11-09 ENCOUNTER — APPOINTMENT (OUTPATIENT)
Dept: HEMATOLOGY ONCOLOGY | Facility: CLINIC | Age: 83
End: 2023-11-09
Payer: MEDICARE

## 2023-11-09 ENCOUNTER — RESULT REVIEW (OUTPATIENT)
Age: 83
End: 2023-11-09

## 2023-11-09 ENCOUNTER — APPOINTMENT (OUTPATIENT)
Dept: RADIATION ONCOLOGY | Facility: CLINIC | Age: 83
End: 2023-11-09
Payer: MEDICARE

## 2023-11-09 ENCOUNTER — APPOINTMENT (OUTPATIENT)
Dept: GERIATRICS | Facility: CLINIC | Age: 83
End: 2023-11-09
Payer: MEDICARE

## 2023-11-09 ENCOUNTER — APPOINTMENT (OUTPATIENT)
Dept: INFUSION THERAPY | Facility: HOSPITAL | Age: 83
End: 2023-11-09

## 2023-11-09 VITALS
HEIGHT: 62.5 IN | BODY MASS INDEX: 27.99 KG/M2 | OXYGEN SATURATION: 96 % | HEART RATE: 66 BPM | TEMPERATURE: 97 F | RESPIRATION RATE: 16 BRPM | WEIGHT: 156 LBS | DIASTOLIC BLOOD PRESSURE: 66 MMHG | SYSTOLIC BLOOD PRESSURE: 104 MMHG

## 2023-11-09 VITALS — WEIGHT: 156.31 LBS | HEART RATE: 67 BPM | OXYGEN SATURATION: 97 % | BODY MASS INDEX: 28.13 KG/M2

## 2023-11-09 VITALS — DIASTOLIC BLOOD PRESSURE: 73 MMHG | SYSTOLIC BLOOD PRESSURE: 138 MMHG

## 2023-11-09 DIAGNOSIS — E27.8 OTHER SPECIFIED DISORDERS OF ADRENAL GLAND: ICD-10-CM

## 2023-11-09 DIAGNOSIS — Z51.12 ENCOUNTER FOR ANTINEOPLASTIC IMMUNOTHERAPY: ICD-10-CM

## 2023-11-09 LAB
ALBUMIN SERPL ELPH-MCNC: 3.8 G/DL — SIGNIFICANT CHANGE UP (ref 3.3–5)
ALBUMIN SERPL ELPH-MCNC: 3.8 G/DL — SIGNIFICANT CHANGE UP (ref 3.3–5)
ALP SERPL-CCNC: 986 U/L — HIGH (ref 40–120)
ALP SERPL-CCNC: 986 U/L — HIGH (ref 40–120)
ALT FLD-CCNC: 370 U/L — HIGH (ref 10–45)
ALT FLD-CCNC: 370 U/L — HIGH (ref 10–45)
ANION GAP SERPL CALC-SCNC: 15 MMOL/L — SIGNIFICANT CHANGE UP (ref 5–17)
ANION GAP SERPL CALC-SCNC: 15 MMOL/L — SIGNIFICANT CHANGE UP (ref 5–17)
AST SERPL-CCNC: 434 U/L — HIGH (ref 10–40)
AST SERPL-CCNC: 434 U/L — HIGH (ref 10–40)
BASOPHILS # BLD AUTO: 0.04 K/UL — SIGNIFICANT CHANGE UP (ref 0–0.2)
BASOPHILS # BLD AUTO: 0.04 K/UL — SIGNIFICANT CHANGE UP (ref 0–0.2)
BASOPHILS NFR BLD AUTO: 0.5 % — SIGNIFICANT CHANGE UP (ref 0–2)
BASOPHILS NFR BLD AUTO: 0.5 % — SIGNIFICANT CHANGE UP (ref 0–2)
BILIRUB DIRECT SERPL-MCNC: 0.3 MG/DL — SIGNIFICANT CHANGE UP (ref 0–0.3)
BILIRUB DIRECT SERPL-MCNC: 0.3 MG/DL — SIGNIFICANT CHANGE UP (ref 0–0.3)
BILIRUB INDIRECT FLD-MCNC: 0.3 MG/DL — SIGNIFICANT CHANGE UP (ref 0.2–1.2)
BILIRUB INDIRECT FLD-MCNC: 0.3 MG/DL — SIGNIFICANT CHANGE UP (ref 0.2–1.2)
BILIRUB SERPL-MCNC: 0.6 MG/DL — SIGNIFICANT CHANGE UP (ref 0.2–1.2)
BILIRUB SERPL-MCNC: 0.6 MG/DL — SIGNIFICANT CHANGE UP (ref 0.2–1.2)
BUN SERPL-MCNC: 16 MG/DL — SIGNIFICANT CHANGE UP (ref 7–23)
BUN SERPL-MCNC: 16 MG/DL — SIGNIFICANT CHANGE UP (ref 7–23)
CALCIUM SERPL-MCNC: 9 MG/DL — SIGNIFICANT CHANGE UP (ref 8.4–10.5)
CALCIUM SERPL-MCNC: 9 MG/DL — SIGNIFICANT CHANGE UP (ref 8.4–10.5)
CHLORIDE SERPL-SCNC: 100 MMOL/L — SIGNIFICANT CHANGE UP (ref 96–108)
CHLORIDE SERPL-SCNC: 100 MMOL/L — SIGNIFICANT CHANGE UP (ref 96–108)
CO2 SERPL-SCNC: 22 MMOL/L — SIGNIFICANT CHANGE UP (ref 22–31)
CO2 SERPL-SCNC: 22 MMOL/L — SIGNIFICANT CHANGE UP (ref 22–31)
CREAT SERPL-MCNC: 0.86 MG/DL — SIGNIFICANT CHANGE UP (ref 0.5–1.3)
CREAT SERPL-MCNC: 0.86 MG/DL — SIGNIFICANT CHANGE UP (ref 0.5–1.3)
EGFR: 67 ML/MIN/1.73M2 — SIGNIFICANT CHANGE UP
EGFR: 67 ML/MIN/1.73M2 — SIGNIFICANT CHANGE UP
EOSINOPHIL # BLD AUTO: 0.72 K/UL — HIGH (ref 0–0.5)
EOSINOPHIL # BLD AUTO: 0.72 K/UL — HIGH (ref 0–0.5)
EOSINOPHIL NFR BLD AUTO: 9.7 % — HIGH (ref 0–6)
EOSINOPHIL NFR BLD AUTO: 9.7 % — HIGH (ref 0–6)
GGT SERPL-CCNC: 525 U/L — HIGH (ref 8–40)
GGT SERPL-CCNC: 525 U/L — HIGH (ref 8–40)
GLUCOSE SERPL-MCNC: 155 MG/DL — HIGH (ref 70–99)
GLUCOSE SERPL-MCNC: 155 MG/DL — HIGH (ref 70–99)
HCT VFR BLD CALC: 39 % — SIGNIFICANT CHANGE UP (ref 34.5–45)
HCT VFR BLD CALC: 39 % — SIGNIFICANT CHANGE UP (ref 34.5–45)
HGB BLD-MCNC: 13 G/DL — SIGNIFICANT CHANGE UP (ref 11.5–15.5)
HGB BLD-MCNC: 13 G/DL — SIGNIFICANT CHANGE UP (ref 11.5–15.5)
IMM GRANULOCYTES NFR BLD AUTO: 0.9 % — SIGNIFICANT CHANGE UP (ref 0–0.9)
IMM GRANULOCYTES NFR BLD AUTO: 0.9 % — SIGNIFICANT CHANGE UP (ref 0–0.9)
LYMPHOCYTES # BLD AUTO: 1.34 K/UL — SIGNIFICANT CHANGE UP (ref 1–3.3)
LYMPHOCYTES # BLD AUTO: 1.34 K/UL — SIGNIFICANT CHANGE UP (ref 1–3.3)
LYMPHOCYTES # BLD AUTO: 18 % — SIGNIFICANT CHANGE UP (ref 13–44)
LYMPHOCYTES # BLD AUTO: 18 % — SIGNIFICANT CHANGE UP (ref 13–44)
MCHC RBC-ENTMCNC: 31.5 PG — SIGNIFICANT CHANGE UP (ref 27–34)
MCHC RBC-ENTMCNC: 31.5 PG — SIGNIFICANT CHANGE UP (ref 27–34)
MCHC RBC-ENTMCNC: 33.3 G/DL — SIGNIFICANT CHANGE UP (ref 32–36)
MCHC RBC-ENTMCNC: 33.3 G/DL — SIGNIFICANT CHANGE UP (ref 32–36)
MCV RBC AUTO: 94.4 FL — SIGNIFICANT CHANGE UP (ref 80–100)
MCV RBC AUTO: 94.4 FL — SIGNIFICANT CHANGE UP (ref 80–100)
MONOCYTES # BLD AUTO: 0.54 K/UL — SIGNIFICANT CHANGE UP (ref 0–0.9)
MONOCYTES # BLD AUTO: 0.54 K/UL — SIGNIFICANT CHANGE UP (ref 0–0.9)
MONOCYTES NFR BLD AUTO: 7.3 % — SIGNIFICANT CHANGE UP (ref 2–14)
MONOCYTES NFR BLD AUTO: 7.3 % — SIGNIFICANT CHANGE UP (ref 2–14)
NEUTROPHILS # BLD AUTO: 4.72 K/UL — SIGNIFICANT CHANGE UP (ref 1.8–7.4)
NEUTROPHILS # BLD AUTO: 4.72 K/UL — SIGNIFICANT CHANGE UP (ref 1.8–7.4)
NEUTROPHILS NFR BLD AUTO: 63.6 % — SIGNIFICANT CHANGE UP (ref 43–77)
NEUTROPHILS NFR BLD AUTO: 63.6 % — SIGNIFICANT CHANGE UP (ref 43–77)
NRBC # BLD: 0 /100 WBCS — SIGNIFICANT CHANGE UP (ref 0–0)
NRBC # BLD: 0 /100 WBCS — SIGNIFICANT CHANGE UP (ref 0–0)
PLATELET # BLD AUTO: 316 K/UL — SIGNIFICANT CHANGE UP (ref 150–400)
PLATELET # BLD AUTO: 316 K/UL — SIGNIFICANT CHANGE UP (ref 150–400)
POTASSIUM SERPL-MCNC: 5.3 MMOL/L — SIGNIFICANT CHANGE UP (ref 3.5–5.3)
POTASSIUM SERPL-MCNC: 5.3 MMOL/L — SIGNIFICANT CHANGE UP (ref 3.5–5.3)
POTASSIUM SERPL-SCNC: 5.3 MMOL/L — SIGNIFICANT CHANGE UP (ref 3.5–5.3)
POTASSIUM SERPL-SCNC: 5.3 MMOL/L — SIGNIFICANT CHANGE UP (ref 3.5–5.3)
PROT SERPL-MCNC: 6.6 G/DL — SIGNIFICANT CHANGE UP (ref 6–8.3)
PROT SERPL-MCNC: 6.6 G/DL — SIGNIFICANT CHANGE UP (ref 6–8.3)
RBC # BLD: 4.13 M/UL — SIGNIFICANT CHANGE UP (ref 3.8–5.2)
RBC # BLD: 4.13 M/UL — SIGNIFICANT CHANGE UP (ref 3.8–5.2)
RBC # FLD: 12 % — SIGNIFICANT CHANGE UP (ref 10.3–14.5)
RBC # FLD: 12 % — SIGNIFICANT CHANGE UP (ref 10.3–14.5)
SODIUM SERPL-SCNC: 137 MMOL/L — SIGNIFICANT CHANGE UP (ref 135–145)
SODIUM SERPL-SCNC: 137 MMOL/L — SIGNIFICANT CHANGE UP (ref 135–145)
T4 FREE+ TSH PNL SERPL: 0.93 UIU/ML — SIGNIFICANT CHANGE UP (ref 0.27–4.2)
T4 FREE+ TSH PNL SERPL: 0.93 UIU/ML — SIGNIFICANT CHANGE UP (ref 0.27–4.2)
WBC # BLD: 7.43 K/UL — SIGNIFICANT CHANGE UP (ref 3.8–10.5)
WBC # BLD: 7.43 K/UL — SIGNIFICANT CHANGE UP (ref 3.8–10.5)
WBC # FLD AUTO: 7.43 K/UL — SIGNIFICANT CHANGE UP (ref 3.8–10.5)
WBC # FLD AUTO: 7.43 K/UL — SIGNIFICANT CHANGE UP (ref 3.8–10.5)

## 2023-11-09 PROCEDURE — 99213 OFFICE O/P EST LOW 20 MIN: CPT

## 2023-11-09 PROCEDURE — 99214 OFFICE O/P EST MOD 30 MIN: CPT

## 2023-11-09 PROCEDURE — 99215 OFFICE O/P EST HI 40 MIN: CPT

## 2023-11-10 DIAGNOSIS — R53.83 OTHER FATIGUE: ICD-10-CM

## 2023-11-10 DIAGNOSIS — R74.01 ELEVATION OF LEVELS OF LIVER TRANSAMINASE LEVELS: ICD-10-CM

## 2023-11-15 ENCOUNTER — OUTPATIENT (OUTPATIENT)
Dept: OUTPATIENT SERVICES | Facility: HOSPITAL | Age: 83
LOS: 1 days | Discharge: HOME | End: 2023-11-15

## 2023-11-15 DIAGNOSIS — C34.90 MALIGNANT NEOPLASM OF UNSPECIFIED PART OF UNSPECIFIED BRONCHUS OR LUNG: ICD-10-CM

## 2023-11-15 DIAGNOSIS — I73.9 PERIPHERAL VASCULAR DISEASE, UNSPECIFIED: Chronic | ICD-10-CM

## 2023-11-15 DIAGNOSIS — I25.10 ATHEROSCLEROTIC HEART DISEASE OF NATIVE CORONARY ARTERY WITHOUT ANGINA PECTORIS: Chronic | ICD-10-CM

## 2023-11-16 ENCOUNTER — NON-APPOINTMENT (OUTPATIENT)
Age: 83
End: 2023-11-16

## 2023-11-16 ENCOUNTER — APPOINTMENT (OUTPATIENT)
Dept: ULTRASOUND IMAGING | Facility: IMAGING CENTER | Age: 83
End: 2023-11-16
Payer: MEDICARE

## 2023-11-16 ENCOUNTER — OUTPATIENT (OUTPATIENT)
Dept: OUTPATIENT SERVICES | Facility: HOSPITAL | Age: 83
LOS: 1 days | End: 2023-11-16
Payer: MEDICARE

## 2023-11-16 DIAGNOSIS — C34.12 MALIGNANT NEOPLASM OF UPPER LOBE, LEFT BRONCHUS OR LUNG: ICD-10-CM

## 2023-11-16 DIAGNOSIS — I73.9 PERIPHERAL VASCULAR DISEASE, UNSPECIFIED: Chronic | ICD-10-CM

## 2023-11-16 DIAGNOSIS — I25.10 ATHEROSCLEROTIC HEART DISEASE OF NATIVE CORONARY ARTERY WITHOUT ANGINA PECTORIS: Chronic | ICD-10-CM

## 2023-11-16 PROCEDURE — 76700 US EXAM ABDOM COMPLETE: CPT

## 2023-11-16 PROCEDURE — 76700 US EXAM ABDOM COMPLETE: CPT | Mod: 26

## 2023-11-30 ENCOUNTER — APPOINTMENT (OUTPATIENT)
Dept: INFUSION THERAPY | Facility: HOSPITAL | Age: 83
End: 2023-11-30

## 2023-11-30 ENCOUNTER — APPOINTMENT (OUTPATIENT)
Dept: GERIATRICS | Facility: CLINIC | Age: 83
End: 2023-11-30

## 2023-11-30 ENCOUNTER — APPOINTMENT (OUTPATIENT)
Dept: HEMATOLOGY ONCOLOGY | Facility: CLINIC | Age: 83
End: 2023-11-30

## 2023-12-07 ENCOUNTER — APPOINTMENT (OUTPATIENT)
Dept: HEPATOLOGY | Facility: CLINIC | Age: 83
End: 2023-12-07
Payer: MEDICARE

## 2023-12-07 VITALS
RESPIRATION RATE: 16 BRPM | TEMPERATURE: 97.3 F | HEIGHT: 62.5 IN | SYSTOLIC BLOOD PRESSURE: 130 MMHG | DIASTOLIC BLOOD PRESSURE: 81 MMHG | WEIGHT: 160 LBS | HEART RATE: 86 BPM | BODY MASS INDEX: 28.71 KG/M2 | OXYGEN SATURATION: 98 %

## 2023-12-07 PROCEDURE — 99213 OFFICE O/P EST LOW 20 MIN: CPT

## 2023-12-09 ENCOUNTER — INPATIENT (INPATIENT)
Facility: HOSPITAL | Age: 83
LOS: 8 days | Discharge: HOME HEALTH | DRG: 442 | End: 2023-12-18
Attending: INTERNAL MEDICINE | Admitting: INTERNAL MEDICINE
Payer: MEDICARE

## 2023-12-09 VITALS
DIASTOLIC BLOOD PRESSURE: 76 MMHG | HEIGHT: 62.5 IN | WEIGHT: 160.06 LBS | OXYGEN SATURATION: 96 % | TEMPERATURE: 99 F | HEART RATE: 64 BPM | RESPIRATION RATE: 18 BRPM | SYSTOLIC BLOOD PRESSURE: 122 MMHG

## 2023-12-09 DIAGNOSIS — E78.5 HYPERLIPIDEMIA, UNSPECIFIED: ICD-10-CM

## 2023-12-09 DIAGNOSIS — I25.10 ATHEROSCLEROTIC HEART DISEASE OF NATIVE CORONARY ARTERY WITHOUT ANGINA PECTORIS: Chronic | ICD-10-CM

## 2023-12-09 DIAGNOSIS — I73.9 PERIPHERAL VASCULAR DISEASE, UNSPECIFIED: Chronic | ICD-10-CM

## 2023-12-09 DIAGNOSIS — C34.90 MALIGNANT NEOPLASM OF UNSPECIFIED PART OF UNSPECIFIED BRONCHUS OR LUNG: ICD-10-CM

## 2023-12-09 DIAGNOSIS — I48.20 CHRONIC ATRIAL FIBRILLATION, UNSPECIFIED: ICD-10-CM

## 2023-12-09 DIAGNOSIS — I10 ESSENTIAL (PRIMARY) HYPERTENSION: ICD-10-CM

## 2023-12-09 DIAGNOSIS — K76.9 LIVER DISEASE, UNSPECIFIED: ICD-10-CM

## 2023-12-09 DIAGNOSIS — R74.01 ELEVATION OF LEVELS OF LIVER TRANSAMINASE LEVELS: ICD-10-CM

## 2023-12-09 DIAGNOSIS — Z29.9 ENCOUNTER FOR PROPHYLACTIC MEASURES, UNSPECIFIED: ICD-10-CM

## 2023-12-09 DIAGNOSIS — E11.9 TYPE 2 DIABETES MELLITUS WITHOUT COMPLICATIONS: ICD-10-CM

## 2023-12-09 DIAGNOSIS — I25.10 ATHEROSCLEROTIC HEART DISEASE OF NATIVE CORONARY ARTERY WITHOUT ANGINA PECTORIS: ICD-10-CM

## 2023-12-09 LAB
ALBUMIN SERPL ELPH-MCNC: 3.5 G/DL — SIGNIFICANT CHANGE UP (ref 3.3–5)
ALBUMIN SERPL ELPH-MCNC: 3.5 G/DL — SIGNIFICANT CHANGE UP (ref 3.3–5)
ALP SERPL-CCNC: 1331 U/L — HIGH (ref 40–120)
ALP SERPL-CCNC: 1331 U/L — HIGH (ref 40–120)
ALT FLD-CCNC: 929 U/L — HIGH (ref 10–45)
ALT FLD-CCNC: 929 U/L — HIGH (ref 10–45)
ANION GAP SERPL CALC-SCNC: 16 MMOL/L — SIGNIFICANT CHANGE UP (ref 5–17)
ANION GAP SERPL CALC-SCNC: 16 MMOL/L — SIGNIFICANT CHANGE UP (ref 5–17)
APPEARANCE UR: ABNORMAL
APPEARANCE UR: ABNORMAL
APTT BLD: 38.9 SEC — HIGH (ref 24.5–35.6)
APTT BLD: 38.9 SEC — HIGH (ref 24.5–35.6)
AST SERPL-CCNC: 1481 U/L — HIGH (ref 10–40)
AST SERPL-CCNC: 1481 U/L — HIGH (ref 10–40)
BACTERIA # UR AUTO: ABNORMAL /HPF
BACTERIA # UR AUTO: ABNORMAL /HPF
BASOPHILS # BLD AUTO: 0.03 K/UL — SIGNIFICANT CHANGE UP (ref 0–0.2)
BASOPHILS # BLD AUTO: 0.03 K/UL — SIGNIFICANT CHANGE UP (ref 0–0.2)
BASOPHILS NFR BLD AUTO: 0.6 % — SIGNIFICANT CHANGE UP (ref 0–2)
BASOPHILS NFR BLD AUTO: 0.6 % — SIGNIFICANT CHANGE UP (ref 0–2)
BILIRUB DIRECT SERPL-MCNC: 4.6 MG/DL — HIGH (ref 0–0.3)
BILIRUB DIRECT SERPL-MCNC: 4.6 MG/DL — HIGH (ref 0–0.3)
BILIRUB INDIRECT FLD-MCNC: 1.3 MG/DL — HIGH (ref 0.2–1)
BILIRUB INDIRECT FLD-MCNC: 1.3 MG/DL — HIGH (ref 0.2–1)
BILIRUB SERPL-MCNC: 5.9 MG/DL — HIGH (ref 0.2–1.2)
BILIRUB UR-MCNC: ABNORMAL
BILIRUB UR-MCNC: ABNORMAL
BUN SERPL-MCNC: 34 MG/DL — HIGH (ref 7–23)
BUN SERPL-MCNC: 34 MG/DL — HIGH (ref 7–23)
CALCIUM SERPL-MCNC: 9.4 MG/DL — SIGNIFICANT CHANGE UP (ref 8.4–10.5)
CALCIUM SERPL-MCNC: 9.4 MG/DL — SIGNIFICANT CHANGE UP (ref 8.4–10.5)
CAST: 5 /LPF — HIGH (ref 0–4)
CAST: 5 /LPF — HIGH (ref 0–4)
CHLORIDE SERPL-SCNC: 97 MMOL/L — SIGNIFICANT CHANGE UP (ref 96–108)
CHLORIDE SERPL-SCNC: 97 MMOL/L — SIGNIFICANT CHANGE UP (ref 96–108)
CMV IGG FLD QL: >10 U/ML — HIGH
CMV IGG FLD QL: >10 U/ML — HIGH
CMV IGG SERPL-IMP: POSITIVE
CMV IGG SERPL-IMP: POSITIVE
CMV IGM FLD-ACNC: <8 AU/ML — SIGNIFICANT CHANGE UP
CMV IGM FLD-ACNC: <8 AU/ML — SIGNIFICANT CHANGE UP
CMV IGM SERPL QL: NEGATIVE — SIGNIFICANT CHANGE UP
CMV IGM SERPL QL: NEGATIVE — SIGNIFICANT CHANGE UP
CO2 SERPL-SCNC: 24 MMOL/L — SIGNIFICANT CHANGE UP (ref 22–31)
CO2 SERPL-SCNC: 24 MMOL/L — SIGNIFICANT CHANGE UP (ref 22–31)
COLOR SPEC: SIGNIFICANT CHANGE UP
COLOR SPEC: SIGNIFICANT CHANGE UP
CREAT SERPL-MCNC: 1.5 MG/DL — HIGH (ref 0.5–1.3)
CREAT SERPL-MCNC: 1.5 MG/DL — HIGH (ref 0.5–1.3)
DIFF PNL FLD: NEGATIVE — SIGNIFICANT CHANGE UP
DIFF PNL FLD: NEGATIVE — SIGNIFICANT CHANGE UP
EBV EA AB SER IA-ACNC: <5 U/ML — SIGNIFICANT CHANGE UP
EBV EA AB SER IA-ACNC: <5 U/ML — SIGNIFICANT CHANGE UP
EBV EA AB TITR SER IF: POSITIVE
EBV EA AB TITR SER IF: POSITIVE
EBV EA IGG SER-ACNC: NEGATIVE — SIGNIFICANT CHANGE UP
EBV EA IGG SER-ACNC: NEGATIVE — SIGNIFICANT CHANGE UP
EBV NA IGG SER IA-ACNC: 55.7 U/ML — HIGH
EBV NA IGG SER IA-ACNC: 55.7 U/ML — HIGH
EBV PATRN SPEC IB-IMP: SIGNIFICANT CHANGE UP
EBV PATRN SPEC IB-IMP: SIGNIFICANT CHANGE UP
EBV VCA IGG AVIDITY SER QL IA: POSITIVE
EBV VCA IGG AVIDITY SER QL IA: POSITIVE
EBV VCA IGM SER IA-ACNC: 12.1 U/ML — SIGNIFICANT CHANGE UP
EBV VCA IGM SER IA-ACNC: 12.1 U/ML — SIGNIFICANT CHANGE UP
EBV VCA IGM SER IA-ACNC: >750 U/ML — HIGH
EBV VCA IGM SER IA-ACNC: >750 U/ML — HIGH
EBV VCA IGM TITR FLD: NEGATIVE — SIGNIFICANT CHANGE UP
EBV VCA IGM TITR FLD: NEGATIVE — SIGNIFICANT CHANGE UP
EGFR: 34 ML/MIN/1.73M2 — LOW
EGFR: 34 ML/MIN/1.73M2 — LOW
EOSINOPHIL # BLD AUTO: 0.3 K/UL — SIGNIFICANT CHANGE UP (ref 0–0.5)
EOSINOPHIL # BLD AUTO: 0.3 K/UL — SIGNIFICANT CHANGE UP (ref 0–0.5)
EOSINOPHIL NFR BLD AUTO: 5.5 % — SIGNIFICANT CHANGE UP (ref 0–6)
EOSINOPHIL NFR BLD AUTO: 5.5 % — SIGNIFICANT CHANGE UP (ref 0–6)
GLUCOSE BLDC GLUCOMTR-MCNC: 209 MG/DL — HIGH (ref 70–99)
GLUCOSE BLDC GLUCOMTR-MCNC: 209 MG/DL — HIGH (ref 70–99)
GLUCOSE BLDC GLUCOMTR-MCNC: 258 MG/DL — HIGH (ref 70–99)
GLUCOSE BLDC GLUCOMTR-MCNC: 258 MG/DL — HIGH (ref 70–99)
GLUCOSE SERPL-MCNC: 206 MG/DL — HIGH (ref 70–99)
GLUCOSE SERPL-MCNC: 206 MG/DL — HIGH (ref 70–99)
GLUCOSE UR QL: NEGATIVE MG/DL — SIGNIFICANT CHANGE UP
GLUCOSE UR QL: NEGATIVE MG/DL — SIGNIFICANT CHANGE UP
HAV IGM SER-ACNC: SIGNIFICANT CHANGE UP
HAV IGM SER-ACNC: SIGNIFICANT CHANGE UP
HBV CORE IGM SER-ACNC: SIGNIFICANT CHANGE UP
HBV CORE IGM SER-ACNC: SIGNIFICANT CHANGE UP
HBV SURFACE AG SER-ACNC: SIGNIFICANT CHANGE UP
HBV SURFACE AG SER-ACNC: SIGNIFICANT CHANGE UP
HCT VFR BLD CALC: 38.3 % — SIGNIFICANT CHANGE UP (ref 34.5–45)
HCT VFR BLD CALC: 38.3 % — SIGNIFICANT CHANGE UP (ref 34.5–45)
HCV AB S/CO SERPL IA: 0.06 S/CO — SIGNIFICANT CHANGE UP (ref 0–0.99)
HCV AB S/CO SERPL IA: 0.06 S/CO — SIGNIFICANT CHANGE UP (ref 0–0.99)
HCV AB SERPL-IMP: SIGNIFICANT CHANGE UP
HCV AB SERPL-IMP: SIGNIFICANT CHANGE UP
HGB BLD-MCNC: 12.7 G/DL — SIGNIFICANT CHANGE UP (ref 11.5–15.5)
HGB BLD-MCNC: 12.7 G/DL — SIGNIFICANT CHANGE UP (ref 11.5–15.5)
IMM GRANULOCYTES NFR BLD AUTO: 0.2 % — SIGNIFICANT CHANGE UP (ref 0–0.9)
IMM GRANULOCYTES NFR BLD AUTO: 0.2 % — SIGNIFICANT CHANGE UP (ref 0–0.9)
INR BLD: 1.47 RATIO — HIGH (ref 0.85–1.18)
INR BLD: 1.47 RATIO — HIGH (ref 0.85–1.18)
KETONES UR-MCNC: NEGATIVE MG/DL — SIGNIFICANT CHANGE UP
KETONES UR-MCNC: NEGATIVE MG/DL — SIGNIFICANT CHANGE UP
LEUKOCYTE ESTERASE UR-ACNC: ABNORMAL
LEUKOCYTE ESTERASE UR-ACNC: ABNORMAL
LIDOCAIN IGE QN: 43 U/L — SIGNIFICANT CHANGE UP (ref 7–60)
LIDOCAIN IGE QN: 43 U/L — SIGNIFICANT CHANGE UP (ref 7–60)
LYMPHOCYTES # BLD AUTO: 1.17 K/UL — SIGNIFICANT CHANGE UP (ref 1–3.3)
LYMPHOCYTES # BLD AUTO: 1.17 K/UL — SIGNIFICANT CHANGE UP (ref 1–3.3)
LYMPHOCYTES # BLD AUTO: 21.5 % — SIGNIFICANT CHANGE UP (ref 13–44)
LYMPHOCYTES # BLD AUTO: 21.5 % — SIGNIFICANT CHANGE UP (ref 13–44)
MAGNESIUM SERPL-MCNC: 1.5 MG/DL — LOW (ref 1.6–2.6)
MAGNESIUM SERPL-MCNC: 1.5 MG/DL — LOW (ref 1.6–2.6)
MCHC RBC-ENTMCNC: 30.2 PG — SIGNIFICANT CHANGE UP (ref 27–34)
MCHC RBC-ENTMCNC: 30.2 PG — SIGNIFICANT CHANGE UP (ref 27–34)
MCHC RBC-ENTMCNC: 33.2 GM/DL — SIGNIFICANT CHANGE UP (ref 32–36)
MCHC RBC-ENTMCNC: 33.2 GM/DL — SIGNIFICANT CHANGE UP (ref 32–36)
MCV RBC AUTO: 91.2 FL — SIGNIFICANT CHANGE UP (ref 80–100)
MCV RBC AUTO: 91.2 FL — SIGNIFICANT CHANGE UP (ref 80–100)
MONOCYTES # BLD AUTO: 0.53 K/UL — SIGNIFICANT CHANGE UP (ref 0–0.9)
MONOCYTES # BLD AUTO: 0.53 K/UL — SIGNIFICANT CHANGE UP (ref 0–0.9)
MONOCYTES NFR BLD AUTO: 9.7 % — SIGNIFICANT CHANGE UP (ref 2–14)
MONOCYTES NFR BLD AUTO: 9.7 % — SIGNIFICANT CHANGE UP (ref 2–14)
NEUTROPHILS # BLD AUTO: 3.4 K/UL — SIGNIFICANT CHANGE UP (ref 1.8–7.4)
NEUTROPHILS # BLD AUTO: 3.4 K/UL — SIGNIFICANT CHANGE UP (ref 1.8–7.4)
NEUTROPHILS NFR BLD AUTO: 62.5 % — SIGNIFICANT CHANGE UP (ref 43–77)
NEUTROPHILS NFR BLD AUTO: 62.5 % — SIGNIFICANT CHANGE UP (ref 43–77)
NITRITE UR-MCNC: NEGATIVE — SIGNIFICANT CHANGE UP
NITRITE UR-MCNC: NEGATIVE — SIGNIFICANT CHANGE UP
NRBC # BLD: 0 /100 WBCS — SIGNIFICANT CHANGE UP (ref 0–0)
NRBC # BLD: 0 /100 WBCS — SIGNIFICANT CHANGE UP (ref 0–0)
PH UR: 5 — SIGNIFICANT CHANGE UP (ref 5–8)
PH UR: 5 — SIGNIFICANT CHANGE UP (ref 5–8)
PLATELET # BLD AUTO: 266 K/UL — SIGNIFICANT CHANGE UP (ref 150–400)
PLATELET # BLD AUTO: 266 K/UL — SIGNIFICANT CHANGE UP (ref 150–400)
POTASSIUM SERPL-MCNC: 3.9 MMOL/L — SIGNIFICANT CHANGE UP (ref 3.5–5.3)
POTASSIUM SERPL-MCNC: 3.9 MMOL/L — SIGNIFICANT CHANGE UP (ref 3.5–5.3)
POTASSIUM SERPL-SCNC: 3.9 MMOL/L — SIGNIFICANT CHANGE UP (ref 3.5–5.3)
POTASSIUM SERPL-SCNC: 3.9 MMOL/L — SIGNIFICANT CHANGE UP (ref 3.5–5.3)
PROT SERPL-MCNC: 6.8 G/DL — SIGNIFICANT CHANGE UP (ref 6–8.3)
PROT SERPL-MCNC: 6.8 G/DL — SIGNIFICANT CHANGE UP (ref 6–8.3)
PROT UR-MCNC: 30 MG/DL
PROT UR-MCNC: 30 MG/DL
PROTHROM AB SERPL-ACNC: 16 SEC — HIGH (ref 9.5–13)
PROTHROM AB SERPL-ACNC: 16 SEC — HIGH (ref 9.5–13)
RBC # BLD: 4.2 M/UL — SIGNIFICANT CHANGE UP (ref 3.8–5.2)
RBC # BLD: 4.2 M/UL — SIGNIFICANT CHANGE UP (ref 3.8–5.2)
RBC # FLD: 14.6 % — HIGH (ref 10.3–14.5)
RBC # FLD: 14.6 % — HIGH (ref 10.3–14.5)
RBC CASTS # UR COMP ASSIST: 2 /HPF — SIGNIFICANT CHANGE UP (ref 0–4)
RBC CASTS # UR COMP ASSIST: 2 /HPF — SIGNIFICANT CHANGE UP (ref 0–4)
REVIEW: SIGNIFICANT CHANGE UP
REVIEW: SIGNIFICANT CHANGE UP
SODIUM SERPL-SCNC: 137 MMOL/L — SIGNIFICANT CHANGE UP (ref 135–145)
SODIUM SERPL-SCNC: 137 MMOL/L — SIGNIFICANT CHANGE UP (ref 135–145)
SP GR SPEC: >1.03 — HIGH (ref 1–1.03)
SP GR SPEC: >1.03 — HIGH (ref 1–1.03)
SQUAMOUS # UR AUTO: 11 /HPF — HIGH (ref 0–5)
SQUAMOUS # UR AUTO: 11 /HPF — HIGH (ref 0–5)
UROBILINOGEN FLD QL: 1 MG/DL — SIGNIFICANT CHANGE UP (ref 0.2–1)
UROBILINOGEN FLD QL: 1 MG/DL — SIGNIFICANT CHANGE UP (ref 0.2–1)
WBC # BLD: 5.44 K/UL — SIGNIFICANT CHANGE UP (ref 3.8–10.5)
WBC # BLD: 5.44 K/UL — SIGNIFICANT CHANGE UP (ref 3.8–10.5)
WBC # FLD AUTO: 5.44 K/UL — SIGNIFICANT CHANGE UP (ref 3.8–10.5)
WBC # FLD AUTO: 5.44 K/UL — SIGNIFICANT CHANGE UP (ref 3.8–10.5)
WBC CLUMPS # UR AUTO: PRESENT
WBC CLUMPS # UR AUTO: PRESENT
WBC UR QL: 66 /HPF — HIGH (ref 0–5)
WBC UR QL: 66 /HPF — HIGH (ref 0–5)

## 2023-12-09 PROCEDURE — 74177 CT ABD & PELVIS W/CONTRAST: CPT | Mod: 26,MG

## 2023-12-09 PROCEDURE — 71045 X-RAY EXAM CHEST 1 VIEW: CPT | Mod: 26

## 2023-12-09 PROCEDURE — 99223 1ST HOSP IP/OBS HIGH 75: CPT

## 2023-12-09 PROCEDURE — G1004: CPT

## 2023-12-09 PROCEDURE — 99285 EMERGENCY DEPT VISIT HI MDM: CPT | Mod: FS

## 2023-12-09 PROCEDURE — 99222 1ST HOSP IP/OBS MODERATE 55: CPT

## 2023-12-09 RX ORDER — SODIUM CHLORIDE 9 MG/ML
500 INJECTION INTRAMUSCULAR; INTRAVENOUS; SUBCUTANEOUS ONCE
Refills: 0 | Status: COMPLETED | OUTPATIENT
Start: 2023-12-09 | End: 2023-12-09

## 2023-12-09 RX ORDER — SODIUM CHLORIDE 9 MG/ML
1000 INJECTION, SOLUTION INTRAVENOUS
Refills: 0 | Status: DISCONTINUED | OUTPATIENT
Start: 2023-12-09 | End: 2023-12-18

## 2023-12-09 RX ORDER — GLUCAGON INJECTION, SOLUTION 0.5 MG/.1ML
1 INJECTION, SOLUTION SUBCUTANEOUS ONCE
Refills: 0 | Status: DISCONTINUED | OUTPATIENT
Start: 2023-12-09 | End: 2023-12-18

## 2023-12-09 RX ORDER — DEXTROSE 50 % IN WATER 50 %
12.5 SYRINGE (ML) INTRAVENOUS ONCE
Refills: 0 | Status: DISCONTINUED | OUTPATIENT
Start: 2023-12-09 | End: 2023-12-18

## 2023-12-09 RX ORDER — GABAPENTIN 400 MG/1
1 CAPSULE ORAL
Refills: 0 | DISCHARGE

## 2023-12-09 RX ORDER — LEVETIRACETAM 250 MG/1
1000 TABLET, FILM COATED ORAL
Refills: 0 | Status: DISCONTINUED | OUTPATIENT
Start: 2023-12-09 | End: 2023-12-18

## 2023-12-09 RX ORDER — PREGABALIN 225 MG/1
0 CAPSULE ORAL
Refills: 0 | DISCHARGE

## 2023-12-09 RX ORDER — GABAPENTIN 400 MG/1
100 CAPSULE ORAL DAILY
Refills: 0 | Status: DISCONTINUED | OUTPATIENT
Start: 2023-12-09 | End: 2023-12-18

## 2023-12-09 RX ORDER — DEXTROSE 50 % IN WATER 50 %
15 SYRINGE (ML) INTRAVENOUS ONCE
Refills: 0 | Status: DISCONTINUED | OUTPATIENT
Start: 2023-12-09 | End: 2023-12-18

## 2023-12-09 RX ORDER — CHOLECALCIFEROL (VITAMIN D3) 125 MCG
1000 CAPSULE ORAL DAILY
Refills: 0 | Status: DISCONTINUED | OUTPATIENT
Start: 2023-12-09 | End: 2023-12-18

## 2023-12-09 RX ORDER — APIXABAN 2.5 MG/1
2.5 TABLET, FILM COATED ORAL EVERY 12 HOURS
Refills: 0 | Status: DISCONTINUED | OUTPATIENT
Start: 2023-12-09 | End: 2023-12-18

## 2023-12-09 RX ORDER — GABAPENTIN 400 MG/1
300 CAPSULE ORAL AT BEDTIME
Refills: 0 | Status: DISCONTINUED | OUTPATIENT
Start: 2023-12-09 | End: 2023-12-18

## 2023-12-09 RX ORDER — DEXTROSE 50 % IN WATER 50 %
25 SYRINGE (ML) INTRAVENOUS ONCE
Refills: 0 | Status: DISCONTINUED | OUTPATIENT
Start: 2023-12-09 | End: 2023-12-18

## 2023-12-09 RX ORDER — INSULIN LISPRO 100/ML
VIAL (ML) SUBCUTANEOUS
Refills: 0 | Status: DISCONTINUED | OUTPATIENT
Start: 2023-12-09 | End: 2023-12-18

## 2023-12-09 RX ORDER — SODIUM CHLORIDE 9 MG/ML
1000 INJECTION INTRAMUSCULAR; INTRAVENOUS; SUBCUTANEOUS ONCE
Refills: 0 | Status: DISCONTINUED | OUTPATIENT
Start: 2023-12-09 | End: 2023-12-09

## 2023-12-09 RX ORDER — FERROUS GLUCONATE 100 %
1 POWDER (GRAM) MISCELLANEOUS
Refills: 0 | DISCHARGE

## 2023-12-09 RX ORDER — CHOLECALCIFEROL (VITAMIN D3) 125 MCG
1 CAPSULE ORAL
Refills: 0 | DISCHARGE

## 2023-12-09 RX ORDER — TRAMADOL HYDROCHLORIDE 50 MG/1
1 TABLET ORAL
Refills: 0 | DISCHARGE

## 2023-12-09 RX ORDER — TRAMADOL HYDROCHLORIDE 50 MG/1
50 TABLET ORAL THREE TIMES A DAY
Refills: 0 | Status: DISCONTINUED | OUTPATIENT
Start: 2023-12-09 | End: 2023-12-16

## 2023-12-09 RX ORDER — INSULIN LISPRO 100/ML
VIAL (ML) SUBCUTANEOUS AT BEDTIME
Refills: 0 | Status: DISCONTINUED | OUTPATIENT
Start: 2023-12-09 | End: 2023-12-18

## 2023-12-09 RX ORDER — METOPROLOL TARTRATE 50 MG
50 TABLET ORAL
Refills: 0 | Status: DISCONTINUED | OUTPATIENT
Start: 2023-12-09 | End: 2023-12-18

## 2023-12-09 RX ORDER — MAGNESIUM SULFATE 500 MG/ML
2 VIAL (ML) INJECTION ONCE
Refills: 0 | Status: COMPLETED | OUTPATIENT
Start: 2023-12-09 | End: 2023-12-09

## 2023-12-09 RX ADMIN — Medication 25 GRAM(S): at 21:08

## 2023-12-09 RX ADMIN — GABAPENTIN 300 MILLIGRAM(S): 400 CAPSULE ORAL at 22:21

## 2023-12-09 RX ADMIN — TRAMADOL HYDROCHLORIDE 50 MILLIGRAM(S): 50 TABLET ORAL at 22:21

## 2023-12-09 RX ADMIN — Medication 1: at 22:22

## 2023-12-09 RX ADMIN — LEVETIRACETAM 1000 MILLIGRAM(S): 250 TABLET, FILM COATED ORAL at 22:21

## 2023-12-09 RX ADMIN — Medication 1 DROP(S): at 22:22

## 2023-12-09 RX ADMIN — SODIUM CHLORIDE 500 MILLILITER(S): 9 INJECTION INTRAMUSCULAR; INTRAVENOUS; SUBCUTANEOUS at 08:33

## 2023-12-09 RX ADMIN — TRAMADOL HYDROCHLORIDE 50 MILLIGRAM(S): 50 TABLET ORAL at 23:22

## 2023-12-09 NOTE — H&P ADULT - NSHPLABSRESULTS_GEN_ALL_CORE
LABS:                      12.7   5.44  )-----------( 266      ( 09 Dec 2023 08:27 )             38.3     12-09    137  |  97  |  34<H>  ----------------------------<  206<H>  3.9   |  24  |  1.50<H>    Ca    9.4      09 Dec 2023 08:27  Mg     1.5     12-09    TPro  6.8  /  Alb  3.5  /  TBili  5.9<H>  /  DBili  4.6<H>  /  AST  1481<H>  /  ALT  929<H>  /  AlkPhos  1331<H>  12-09    LIVER FUNCTIONS - ( 09 Dec 2023 08:27 )  Alb: 3.5 g/dL / Pro: 6.8 g/dL / ALK PHOS: 1331 U/L / ALT: 929 U/L / AST: 1481 U/L / GGT: x           PT/INR - ( 09 Dec 2023 08:27 )   PT: 16.0 sec;   INR: 1.47 ratio    PTT - ( 09 Dec 2023 08:27 )  PTT:38.9 sec    Urinalysis Basic - ( 09 Dec 2023 16:10 )  Color: Dark Yellow / Appearance: Cloudy / SG: >1.030 / pH: x  Gluc: x / Ketone: Negative mg/dL  / Bili: Small / Urobili: 1.0 mg/dL   Blood: x / Protein: 30 mg/dL / Nitrite: Negative   Leuk Esterase: Moderate / RBC: 2 /HPF / WBC 66 /HPF   Sq Epi: x / Non Sq Epi: 11 /HPF / Bacteria: Many /HPF    IMAGING:  CT Abdomen and Pelvis w/ IV Cont (12.09.23 @ 09:03)  IMPRESSION:  - Mild fat stranding surrounding the 1st and 2nd portions of the duodenum of uncertain etiology, however raising the possibility of duodenal inflammation.  - Periportal lymph node, new since July 18, 2022.    [X] Imaging personally reviewed by me- ?duodenal inflammation    [X] ECG personally interpreted by me- Sinus w/ PACs

## 2023-12-09 NOTE — H&P ADULT - ASSESSMENT
84yo F w/ PMH of HTN, HLD, DM2, CAD c/b MI, Afib on Eliquis, PAD, metastatic lung CA w/ known brain mets s/p craniectomy previously on Keytruda pw elevated liver enzymes noted on outpt labs 82yo F w/ PMH of HTN, HLD, DM2, CAD c/b MI, Afib on Eliquis, PAD, metastatic lung CA w/ known brain mets s/p craniectomy previously on Keytruda pw elevated liver enzymes noted on outpt labs

## 2023-12-09 NOTE — H&P ADULT - NSHPREVIEWOFSYSTEMS_GEN_ALL_CORE
CONSTITUTIONAL: No fever, weight loss  EYES: No eye pain, visual disturbances, or discharge  ENMT: No difficulty hearing, tinnitus, vertigo; No sinus or throat pain  RESPIRATORY: No SOB. No cough, wheezing, chills or hemoptysis  CARDIOVASCULAR: No chest pain, palpitations, dizziness, or leg swelling  GASTROINTESTINAL: No abdominal or epigastric pain. No nausea, vomiting, or hematemesis; No diarrhea or constipation. No melena or hematochezia.  GENITOURINARY: No dysuria, frequency, hematuria, or incontinence  NEUROLOGICAL: No headaches, memory loss, loss of strength, numbness, or tremors  SKIN: Pruritis. No burning, rashes, or lesions   LYMPH NODES: No enlarged glands  ENDOCRINE: No heat or cold intolerance; No hair loss  MUSCULOSKELETAL: No joint pain or swelling; No muscle, back pain  PSYCHIATRIC: No depression, anxiety, mood swings, or difficulty sleeping  HEME/LYMPH: No easy bruising, or bleeding gums

## 2023-12-09 NOTE — ED ADULT NURSE NOTE - NSFALLRISKINTERV_ED_ALL_ED
Monitor for mental status changes and reorient to person, place, and time, as needed/Reinforce activity limits and safety measures with patient and family/Bed in lowest position, wheels locked, appropriate side rails in place/Physically safe environment - no spills, clutter or unnecessary equipment/Purposeful Proactive Rounding

## 2023-12-09 NOTE — CONSULT NOTE ADULT - ASSESSMENT
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 autoimmune or viral hepatitis vs. less likely ischemic hepatitis  #coagulopathy, INR 1.47 (on Eliquis at home)  #A&Ox3; no asterixis  - AST/ALT 1400/900; TB 5.9; ALP 1300s on presentation; previously AST/ALT 400s/300s (11/9/23); and prior to that, numbers were normal  - last dose of Keytruda 11/9/23  - recent outpatient workup: negative HAV IgG, EBV PCR, CMV PCR, ANNETTE, IGG, M2 Ab  - US Abd 11/16 normal liver; CBD 4mm  - no alcohol, Tylenol use, or herbal supplement usage    Recommendations  - acute viral hepatitis panel, plus HBV PCR, HCV PCR, HEV IgM/IgG  - ASMA, AMA, LKM  - trend LFTs Q8H  - monitor mental status; if any signs of encephalopathy, please call GI fellow on call and start lactulose 20g TID with goal 3-5 BMs per day  - collect ammonia level    Note and recommendations are incomplete until reviewed and attested by attending.    Chris Murdock MD  GI/Hepatology Fellow, PGY4  Teams preferred (7AM to 5PM); after 5PM, call GI fellow on call    NEW CONSULTS:  Please email [email or web address] with hx of hypertension, hyperlipidemia, NSTEMI, PAD, A-fib on Eliquis, DM, metastatic lung CA with known brain mets s/p craniectomy/resection/radiation therapy followed by Keytruda (last dose >3 weeks ago; stopped due to suspected hepatotoxicity) presenting with worsening LFT elevation    Impression  #hepatocellular injury; DDx include DILI 2/2 Keytruda vs. autoimmune or viral hepatitis vs. less likely ischemic hepatitis  #coagulopathy, INR 1.47 (on Eliquis at home)  #A&Ox3; no asterixis  - AST/ALT 1400/900; TB 5.9; ALP 1300s on presentation; previously AST/ALT 400s/300s (11/9/23); and prior to that, numbers were normal  - last dose of Keytruda 11/9/23  - recent outpatient workup: negative HAV IgG, EBV PCR, CMV PCR, ANNETTE, IGG, M2 Ab  - US Abd 11/16 normal liver; CBD 4mm  - no alcohol, Tylenol use, or herbal supplement usage    Recommendations  - acute viral hepatitis panel, plus HBV PCR, HCV PCR, HEV IgM/IgG  - ASMA, AMA, LKM  - trend LFTs Q8H  - monitor mental status; if any signs of encephalopathy, please call GI fellow on call and start lactulose 20g TID with goal 3-5 BMs per day  - collect ammonia level    Note and recommendations are incomplete until reviewed and attested by attending.    Chris Murdock MD  GI/Hepatology Fellow, PGY4  Teams preferred (7AM to 5PM); after 5PM, call GI fellow on call    NEW CONSULTS:  Please email [email or web address] with hx of hypertension, hyperlipidemia, NSTEMI, PAD, A-fib on Eliquis, DM, metastatic lung CA with known brain mets s/p craniectomy/resection/radiation therapy followed by Keytruda (last dose >3 weeks ago; stopped due to suspected hepatotoxicity) presenting with worsening LFT elevation    Impression  #hepatocellular injury; DDx include DILI 2/2 Keytruda vs. autoimmune or viral hepatitis vs. less likely ischemic hepatitis  #coagulopathy, INR 1.47 (on Eliquis at home)  #A&Ox3; no asterixis  - AST/ALT 1400/900; TB 5.9; ALP 1300s on presentation; previously AST/ALT 400s/300s (11/9/23); and prior to that, numbers were normal  - last dose of Keytruda 11/9/23  - recent outpatient workup: negative HAV IgG, EBV PCR, CMV PCR, ANNETTE, IGG, M2 Ab  - US Abd 11/16 normal liver; CBD 4mm  - no alcohol, Tylenol use, or herbal supplement usage    Recommendations  - acute viral hepatitis panel, plus HBV PCR, HCV PCR, HEV IgM/IgG  - ASMA, AMA, LKM  - trend LFTs Q8H  - full infectious workup  - UTox  - monitor mental status; if any signs of encephalopathy, please call GI fellow on call and start lactulose 20g TID with goal 3-5 BMs per day  - collect ammonia level    Note and recommendations are incomplete until reviewed and attested by attending.    Chris Murdock MD  GI/Hepatology Fellow, PGY4  Teams preferred (7AM to 5PM); after 5PM, call GI fellow on call    NEW CONSULTS:  Please email [email or web address] with hx of hypertension, hyperlipidemia, NSTEMI, PAD, A-fib on Eliquis, DM, metastatic lung CA with known brain mets s/p craniectomy/resection/radiation therapy followed by Keytruda (last dose >3 weeks ago; stopped due to suspected hepatotoxicity) presenting with worsening LFT elevation    Impression  #hepatocellular injury; DDx include DILI 2/2 Keytruda vs. autoimmune or viral hepatitis vs. less likely ischemic hepatitis  #coagulopathy, INR 1.47 (on Eliquis at home)  #A&Ox3; no asterixis  - AST/ALT 1400/900; TB 5.9; ALP 1300s on presentation; previously AST/ALT 400s/300s (11/9/23); and prior to that, numbers were normal  - last dose of Keytruda 11/9/23  - recent outpatient workup: negative HAV IgG, EBV PCR, CMV PCR, ANNETTE, IGG, M2 Ab  - US Abd 11/16 normal liver; CBD 4mm  - no alcohol, Tylenol use, or herbal supplement usage    Recommendations  - acute viral hepatitis panel, plus HBV PCR, HCV PCR, HEV IgM/IgG  - ASMA, AMA, LKM  - trend LFTs Q8H  - full infectious workup  - UTox  - monitor mental status; if any signs of encephalopathy, please call GI fellow on call and start lactulose 20g TID with goal 3-5 BMs per day  - collect ammonia level    Note and recommendations are incomplete until reviewed and attested by attending.    Chris Murdock MD  GI/Hepatology Fellow, PGY4  Teams preferred (7AM to 5PM); after 5PM, call GI fellow on call    NEW CONSULTS:  Please email [email or web address] with hx of hypertension, hyperlipidemia, NSTEMI, PAD, A-fib on Eliquis, DM, metastatic lung CA with known brain mets s/p craniectomy/resection/radiation therapy followed by Keytruda (last dose >3 weeks ago; stopped due to suspected hepatotoxicity) presenting with worsening LFT elevation    Impression  #hepatocellular injury; DDx include DILI 2/2 Keytruda vs. autoimmune or viral hepatitis vs. less likely ischemic hepatitis  #coagulopathy, INR 1.47 (on Eliquis at home)  #A&Ox3; no asterixis  - AST/ALT 1400/900; TB 5.9; ALP 1300s on presentation; previously AST/ALT 400s/300s (11/9/23); and prior to that, numbers were normal  - last dose of Keytruda 11/9/23  - recent outpatient workup: negative HAV IgG, EBV PCR, CMV PCR, ANNETTE, IGG, M2 Ab  - US Abd 11/16 normal liver; CBD 4mm  - no alcohol, Tylenol use, or herbal supplement usage    Recommendations  - MRI Abdomen WIC  - acute viral hepatitis panel, plus HBV PCR, HCV PCR, HEV IgM/IgG  - ASMA, AMA, LKM  - trend LFTs Q8H  - full infectious workup  - UTox  - monitor mental status; if any signs of encephalopathy, please call GI fellow on call and start lactulose 20g TID with goal 3-5 BMs per day  - collect ammonia level    Note and recommendations are incomplete until reviewed and attested by attending.    Chris Murdock MD  GI/Hepatology Fellow, PGY4  Teams preferred (7AM to 5PM); after 5PM, call GI fellow on call    NEW CONSULTS:  Please email [email or web address] with hx of hypertension, hyperlipidemia, NSTEMI, PAD, A-fib on Eliquis, DM, metastatic lung CA with known brain mets s/p craniectomy/resection/radiation therapy followed by Keytruda (last dose >3 weeks ago; stopped due to suspected hepatotoxicity) presenting with worsening LFT elevation    Impression  #hepatocellular injury; DDx include DILI 2/2 Keytruda vs. autoimmune or viral hepatitis vs. less likely ischemic hepatitis  #coagulopathy, INR 1.47 (on Eliquis at home)  #A&Ox3; no asterixis  - AST/ALT 1400/900; TB 5.9; ALP 1300s on presentation; previously AST/ALT 400s/300s (11/9/23); and prior to that, numbers were normal  - last dose of Keytruda 11/9/23  - recent outpatient workup: negative HAV IgG, EBV PCR, CMV PCR, ANNETTE, IGG, M2 Ab  - US Abd 11/16 normal liver; CBD 4mm  - no alcohol, Tylenol use, or herbal supplement usage    Recommendations  - MRI Abdomen WIC  - acute viral hepatitis panel, plus HBV PCR, HCV PCR, HEV IgM/IgG  - ASMA, AMA, LKM  - trend LFTs Q8H  - full infectious workup  - UTox  - monitor mental status; if any signs of encephalopathy, please call GI fellow on call and start lactulose 20g TID with goal 3-5 BMs per day  - collect ammonia level    Note and recommendations are incomplete until reviewed and attested by attending.    Chris Murdock MD  GI/Hepatology Fellow, PGY4  Teams preferred (7AM to 5PM); after 5PM, call GI fellow on call    NEW CONSULTS:  Please email [email or web address]

## 2023-12-09 NOTE — CONSULT NOTE ADULT - ASSESSMENT
ECHO 10/3/22: EF 40-45%. Akinesis of the inferior with hypokinesis of the inferolateral, septum and anterolateral hypokinetic.    A/p  83y  woman with a PMHx significant for HTN, DM, HLD, CAD, MI, PAD, peripheral neuropathy, former smoker, NSCLC with ANTHONY primary tumor and brain metastasis s/p right craniotomy & resection on 7/27/22 and post-op RT on active treatment, STEMI (10/2022), afib on eliquis, hx of RML PE presenting with elevated lfts     #elevated lfts   -work up per ER   ? med induced   -hepatology eval   -CT abd/pelv noted     #Paroxysmal atrial fibrillation  -resume apixaban   -resume metoprolol    #hypertension  -stable  -resume bb    #Hx NSTEMI, CAD, s/p PCI  -STEMI in 8/2022 treated medically at the time in setting of recent craniotomy  -No chest pain or sob  -no statins given lfts  -can check echo     #Metast. Lung cancer to brain.   -s/p right craniotomy for resection of brain mass 7/27/22  -Heme/onc eval     #history of PE - dx in AUG 2022   -resume ac

## 2023-12-09 NOTE — ED PROVIDER NOTE - PHYSICAL EXAMINATION
Prior Patlois Means was denied CONSTITUTIONAL: Patient is awake, alert and oriented x 3. Patient is well appearing and in no acute distress  HEAD: NCAT  NECK: supple, FROM  LUNGS: CTA b/l, no wheezing or rales   HEART: RRR.+S1S2 no murmurs  ABDOMEN: Soft, non-distended, nttp, no rebound or guarding  EXTREMITY: no edema or calf tenderness b/l, FROM upper and lower ext b/l  SKIN: with no rash or lesions  NEURO: No focal deficits

## 2023-12-09 NOTE — CONSULT NOTE ADULT - TIME BILLING
agree with above  83y  woman with a PMHx significant for HTN, DM, HLD, CAD, MI, PAD, peripheral neuropathy, former smoker, NSCLC with ANTHONY primary tumor and brain metastasis s/p right craniotomy & resection on 7/27/22 and post-op RT on active treatment, STEMI (10/2022), afib on eliquis, hx of RML PE presenting with elevated lfts     #elevated lfts   -work up per ER   ? med induced   -hepatology eval   -CT abd/pelv noted     #Paroxysmal atrial fibrillation  -resume apixaban   -resume metoprolol    #hypertension  -stable  -resume bb    #Hx NSTEMI, CAD, s/p PCI  -STEMI in 8/2022 treated medically at the time in setting of recent craniotomy  -No chest pain or sob  -no statins given lfts  -can check echo     #Metast. Lung cancer to brain.   -s/p right craniotomy for resection of brain mass 7/27/22  -Heme/onc eval     #history of PE - dx in AUG 2022   -resume ac

## 2023-12-09 NOTE — CONSULT NOTE ADULT - SUBJECTIVE AND OBJECTIVE BOX
Chief Complaint:  Patient is a 83y old  Female who presents with a chief complaint of     HPI:  83F with hx of hypertension, hyperlipidemia, NSTEMI, PAD, A-fib on Eliquis, DM, metastatic lung CA with known brain mets s/p craniectomy previously on Keytruda which was recently stopped due to suspected hepatotoxicity presents to emergency department for increasing liver function testing.  Per patient and aide, over the last month patient has had up-trending of her liver function.  She was referred to hepatology whom she been following with and was recommended to come to the emergency department after lab test on 12/7 revealed up-trending AST, ALT and bilirubin.  Patient reports that since her previous to me she has had poor appetite, fatigue and itching to her skin which is unchanged.  She denies headaches, dizziness, chest pain, shortness of breath, abdominal pain nausea or vomiting.    Allergies:  No Known Drug Allergies  adhesives (Pruritus)      Home Medications:    Hospital Medications:      PMHX/PSHX:  Hypertension    Coronary artery disease    Hyperlipidemia    Peripheral artery disease    Myocardial infarct    Peripheral neuropathy    Diabetes    Chronic back pain    Former smoker    Non-small cell lung cancer    PAD (peripheral artery disease)    CAD (coronary artery disease)        Family history:  No pertinent family history in first degree relatives    Family history of stroke (Sibling)        Denies family history of colon cancer/polyps, stomach cancer/polyps, pancreatic cancer/masses, liver cancer/disease, ovarian cancer and endometrial cancer.    Social History:     Tob: Denies  EtOH: Denies  Illicit Drugs: Denies    ROS:     General:  No wt loss, fevers, chills  ENT:  No dysphagia  CV:  No pain, palpitations  Pulm:  No dyspnea, cough  GI:  No pain, No nausea, No vomiting, No diarrhea, No constipation, No rectal bleeding, No tarry stools, No dysphagia,  Muscle:  No pain, weakness  Neuro:  No weakness  Heme:  No ecchymosis  Skin:  No rash    PHYSICAL EXAM:     GENERAL:  No acute distress  HEENT:  no scleral icterus  CHEST:  no accessory muscle use  HEART:  Regular rate and rhythm  ABDOMEN:  Soft, non-tender, non-distended  EXTREMITIES: No edema  SKIN:  No rash/ecchymoses  NEURO:  Alert and oriented x 3; no asterixis    Vital Signs:  Vital Signs Last 24 Hrs  T(C): 36.8 (09 Dec 2023 14:07), Max: 37.1 (09 Dec 2023 07:17)  T(F): 98.2 (09 Dec 2023 14:07), Max: 98.8 (09 Dec 2023 07:17)  HR: 69 (09 Dec 2023 14:07) (64 - 81)  BP: 115/83 (09 Dec 2023 14:07) (89/77 - 122/76)  BP(mean): 94 (09 Dec 2023 14:07) (83 - 94)  RR: 18 (09 Dec 2023 14:07) (16 - 18)  SpO2: 97% (09 Dec 2023 14:07) (95% - 97%)    Parameters below as of 09 Dec 2023 14:07  Patient On (Oxygen Delivery Method): room air      Daily Height in cm: 158.75 (09 Dec 2023 07:17)    Daily     LABS:                        12.7   5.44  )-----------( 266      ( 09 Dec 2023 08:27 )             38.3     Mean Cell Volume: 91.2 fl (12-09-23 @ 08:27)    12-09    137  |  97  |  34<H>  ----------------------------<  206<H>  3.9   |  24  |  1.50<H>    Ca    9.4      09 Dec 2023 08:27  Mg     1.5     12-09    TPro  6.8  /  Alb  3.5  /  TBili  5.9<H>  /  DBili  4.6<H>  /  AST  1481<H>  /  ALT  929<H>  /  AlkPhos  1331<H>  12-09    LIVER FUNCTIONS - ( 09 Dec 2023 08:27 )  Alb: 3.5 g/dL / Pro: 6.8 g/dL / ALK PHOS: 1331 U/L / ALT: 929 U/L / AST: 1481 U/L / GGT: x           PT/INR - ( 09 Dec 2023 08:27 )   PT: 16.0 sec;   INR: 1.47 ratio         PTT - ( 09 Dec 2023 08:27 )  PTT:38.9 sec  Urinalysis Basic - ( 09 Dec 2023 08:27 )    Color: x / Appearance: x / SG: x / pH: x  Gluc: 206 mg/dL / Ketone: x  / Bili: x / Urobili: x   Blood: x / Protein: x / Nitrite: x   Leuk Esterase: x / RBC: x / WBC x   Sq Epi: x / Non Sq Epi: x / Bacteria: x      Amylase Serum--      Lipase serum43       Ammonia--                          12.7   5.44  )-----------( 266      ( 09 Dec 2023 08:27 )             38.3

## 2023-12-09 NOTE — H&P ADULT - PROBLEM SELECTOR PLAN 1
Lincoln Reina(Resident)
- ddx includes DILI 2/2 Keytruda vs less likely Keppra as has been on for a year vs AI vs less likely ischemic   - No longer on Keytruda  - US unremarkable   - f/u hepatitis panel and other hepatitis w/u ordered  - Order AI w/u  - MR Abdomen w/ IV   - Infectious w/u   - Hepatology following, recs appreciated

## 2023-12-09 NOTE — H&P ADULT - HISTORY OF PRESENT ILLNESS
Pt is an 82yo F w/ PMH of HTN, HLD, DM2, CAD c/b MI, Afib on Eliquis, PAD, metastatic lung CA w/ known brain mets s/p craniectomy previously on Keytruda pw elevated liver enzymes noted on outpt labs.     Only symptoms she endorses are feeling weak w/ decreased appetite x1 yr since craniectomy. Went to endo visit about 2-3 weeks ago and was noted to have increased liver enzymes which continued to worsen over that time period, leading ot her presentation to [facility].     Here she denies any HA, dizziness, CP, SOB, abd pain, N/V. Does endorse pruritis that's unchanged. In the ED VSS w/ labs as below w/ elevated liver enzymes. Pt is an 82yo F w/ PMH of HTN, HLD, DM2, CAD c/b MI, Afib on Eliquis, PAD, metastatic lung CA w/ known brain mets s/p craniectomy previously on Keytruda pw elevated liver enzymes noted on outpt labs.     Only symptoms she endorses are feeling weak w/ decreased appetite x1 yr since craniectomy. Went to endo visit about 2-3 weeks ago and was noted to have increased liver enzymes which continued to worsen over that time period, leading ot her presentation to [facility].     Here she denies any HA, dizziness, CP, SOB, abd pain, N/V. Does endorse pruritis that's unchanged. In the ED VSS w/ labs as below w/ elevated liver enzymes.

## 2023-12-09 NOTE — CONSULT NOTE ADULT - SUBJECTIVE AND OBJECTIVE BOX
CARDIOLOGY CONSULT - Dr. Alcaraz         HPI:   83-year-old female with past medical history of hypertension, hyperlipidemia, CAD status post MI, PAD, A-fib on Eliquis, DM, metastatic lung CA with known brain mets status post craniectomy previously on Keytruda which was recently stopped presents emergency department for increasing liver function testing.  Per patient and daughter over the last month patient has had up-trending of her liver function.  She was referred to hepatology whom she been following with and was recommended to come to the emergency department after lab test on 12/7 revealed up-trending AST, ALT and bilirubin.  pt is known form priveious admission, see dr dean as outpt   denies any cp or sb   ros otherwise negative         PAST MEDICAL & SURGICAL HISTORY:  Hypertension      Coronary artery disease      Hyperlipidemia      Peripheral artery disease      Myocardial infarct      Peripheral neuropathy      Diabetes      Chronic back pain      Former smoker      Non-small cell lung cancer      PAD (peripheral artery disease)      CAD (coronary artery disease)  stents              PREVIOUS DIAGNOSTIC TESTING:    < from: TTE with Doppler (w/Cont) (10.03.22 @ 09:51) >  EF (Visual Estimate): 40-45 %  ------------------------------------------------------------------------  Observations:  Mitral Valve: Normal mitral valve. Mild mitral  regurgitation.  Aortic Valve/Aorta: Normal trileaflet aortic valve. No  aortic valve regurgitation seen.  Aortic Root: 3.5 cm.  Left Atrium: Normal left atrium.  LA volume index = 21  cc/m2.  Left Ventricle: Endocardial visualization enhanced with  intravenous injection of Ultrasonic Enhancing Agent  (Definity).  Moderate  segmental left ventricular systolic  dysfunction. LVEF visually is 40-45%. Akinesis of the  inferior with hypokinesis of the inferolateral, septum and  anterolateral hypokinetic. Remaining walls are normal. No  left ventricular thrombus. Normal left ventricular internal  dimensions and wall thicknesses. Mild diastolic dysfunction  (Stage I).  Right Heart: Normal right atrium. Normal right ventricular  size and function. Normal tricuspid valve. No tricuspid  regurgitation. RVSP not calculated due to insufficient  Doppler signal. Normal pulmonic valve.  Pericardium/Pleura: No pericardial effusion seen.  Hemodynamic: Normal IVC.  ------------------------------------------------------------------------  Conclusions:  1. Normal mitral valve. Mild mitral regurgitation.  2. Normal left atrium.  LA volume index = 21 cc/m2.  Endocardial visualization enhanced with intravenous  injection of Ultrasonic Enhancing Agent (Definity).  Moderate segmental left ventricular systolic dysfunction.  LVEF visually is 40-45%. Akinesis of the inferior with  hypokinesis of the inferolateral, septum and anterolateral  hypokinetic. Remaining walls are normal. No left  ventricular thrombus.  4. Mild diastolic dysfunction (Stage I).  5. Normal right atrium.  6. Normal right ventricular size and function.  7. Normal tricuspid valve. No tricuspid regurgitation.RVSP  not calculated due to insufficient Doppler signal.  8. No pericardial effusion seen.  *** Compared with echocardiogram of 8/8/2022, there is a  decrease in LVEF and wall motion abnormalities are new.  ------------------------------------------------------------------------  Confirmed on  10/3/2022 - 17:14:47 by Mariposa Jose M.D.  ------------------------------------------------------------------------    < end of copied text >      MEDICATIONS:  Home Medications:  ferrous gluconate 324 mg (38 mg elemental iron) oral tablet: 1 orally 2 times a day (17 Jul 2023 12:57)  insulin glargine 100 units/mL subcutaneous solution: 12 unit(s) subcutaneous once a day (at bedtime)- Home dose (01 Nov 2022 17:12)      MEDICATIONS  (STANDING):      FAMILY HISTORY:  Family history of stroke (Sibling)        SOCIAL HISTORY:    [ ] Non-smoker  [ ] Smoker  [ ] Alcohol    Allergies    No Known Drug Allergies  adhesives (Pruritus)    Intolerances    	    REVIEW OF SYSTEMS:  CONSTITUTIONAL: No fever, weight loss, or fatigue  EYES: No eye pain, visual disturbances, or discharge  ENMT:  No difficulty hearing, tinnitus, vertigo; No sinus or throat pain  NECK: No pain or stiffness  RESPIRATORY: No cough, wheezing, chills or hemoptysis; No Shortness of Breath  CARDIOVASCULAR: No chest pain, palpitations, passing out, dizziness, or leg swelling  GASTROINTESTINAL: No abdominal or epigastric pain. No nausea, vomiting, or hematemesis; No diarrhea or constipation. No melena or hematochezia.  GENITOURINARY: No dysuria, frequency, hematuria, or incontinence  NEUROLOGICAL: No headaches, memory loss, loss of strength, numbness, or tremors  SKIN: No itching, burning, rashes, or lesions   	    [ ] All others negative	  [ ] Unable to obtain    PHYSICAL EXAM:  T(C): 37.1 (12-09-23 @ 07:17), Max: 37.1 (12-09-23 @ 07:17)  HR: 77 (12-09-23 @ 08:38) (64 - 81)  BP: 104/48 (12-09-23 @ 08:38) (89/77 - 122/76)  RR: 16 (12-09-23 @ 08:38) (16 - 18)  SpO2: 96% (12-09-23 @ 08:38) (96% - 97%)  Wt(kg): --  I&O's Summary      Appearance: Normal	  Psychiatry: A & O x 3, Mood & affect appropriate  HEENT:   Normal oral mucosa, PERRL, EOMI	  Lymphatic: No lymphadenopathy  Cardiovascular: Normal S1 S2,RRR, No JVD, No murmurs  Respiratory: Lungs clear to auscultation	  Gastrointestinal:  Soft, Non-tender, + BS	  Skin: No rashes, No ecchymoses, No cyanosis	  Neurologic: Non-focal  Extremities: Normal range of motion, No clubbing, cyanosis or edema  Vascular: Peripheral pulses palpable 2+ bilaterally    TELEMETRY: 	    ECG:  	  RADIOLOGY:  < from: CT Abdomen and Pelvis w/ IV Cont (12.09.23 @ 09:03) >    IMPRESSION:    MIld fat stranding surrounding the 1st and 2nd portions of the duodenum   of uncertain etiology, however raising the possibility of duodenal   inflammation.    Periportal lymph node, new since July 18, 2022.      < end of copied text >    OTHER: 	  	  LABS:	 	    CARDIAC MARKERS:                                  12.7   5.44  )-----------( 266      ( 09 Dec 2023 08:27 )             38.3     12-09    137  |  97  |  34<H>  ----------------------------<  206<H>  3.9   |  24  |  1.50<H>    Ca    9.4      09 Dec 2023 08:27  Mg     1.5     12-09    TPro  6.8  /  Alb  3.5  /  TBili  5.9<H>  /  DBili  4.6<H>  /  AST  1481<H>  /  ALT  929<H>  /  AlkPhos  1331<H>  12-09    PT/INR - ( 09 Dec 2023 08:27 )   PT: 16.0 sec;   INR: 1.47 ratio         PTT - ( 09 Dec 2023 08:27 )  PTT:38.9 sec  proBNP:   Lipid Profile:   HgA1c:   TSH:

## 2023-12-09 NOTE — H&P ADULT - NSHPPHYSICALEXAM_GEN_ALL_CORE
Vital Signs Last 24 Hrs  T(C): 36.5 (09 Dec 2023 16:55), Max: 37.1 (09 Dec 2023 07:17)  T(F): 97.7 (09 Dec 2023 16:55), Max: 98.8 (09 Dec 2023 07:17)  HR: 57 (09 Dec 2023 16:55) (57 - 81)  BP: 116/75 (09 Dec 2023 16:55) (89/77 - 122/76)  BP(mean): 94 (09 Dec 2023 14:07) (83 - 94)  RR: 18 (09 Dec 2023 16:55) (16 - 18)  SpO2: 98% (09 Dec 2023 16:55) (95% - 98%)    Parameters below as of 09 Dec 2023 16:55  Patient On (Oxygen Delivery Method): room air    CONSTITUTIONAL: Well-groomed, in no apparent distress  EYES: Scleral icterus. No conjunctival or scleral injection;   ENMT: No external nasal lesions; MMM  NECK: Trachea midline without palpable neck mass; thyroid not enlarged and non-tender  RESPIRATORY: Breathing comfortably; no dullness to percussion; lungs CTA without wheeze/rhonchi/rales  CARDIOVASCULAR: +S1S2, RRR, no M/G/R; pedal pulses full and symmetric; no lower extremity edema  GASTROINTESTINAL: No palpable masses or tenderness, +BS throughout, no rebound/guarding; no hepatosplenomegaly; no hernia palpated  LYMPHATIC: No cervical LAD or tenderness  SKIN: No rashes or ulcers noted  NEUROLOGIC: CN II-XII intact; sensation intact in LEs b/l to light touch  PSYCHIATRIC: A&Ox3; mood and affect appropriate; appropriate insight and judgment

## 2023-12-09 NOTE — ED ADULT NURSE NOTE - NSFALLASSESSNEED_ED_ALL_ED
Additional Notes: Hospitalized w/COVID in September. New meds Coreg & hydralazine yes Render Risk Assessment In Note?: no Additional Notes: Pt. advised to elevate legs and use compression socks or stockings to reduce swelling Detail Level: Simple Additional Notes: Pt. advised to use cold compress when itching persists

## 2023-12-09 NOTE — ED PROCEDURE NOTE - PROCEDURE ADDITIONAL DETAILS
Emergency Department Focused Ultrasound performed at patient's bedside for educational purposes. An appropriate follow up study is ordered. -Rae Pat MD
no

## 2023-12-09 NOTE — ED PROVIDER NOTE - ATTENDING APP SHARED VISIT CONTRIBUTION OF CARE
Attending MD SANTOSH Pat- This was a shared visit with JHON.  I have reviewed and discussed the case with the JOHN and agree with verified documentation unless otherwise documented.  I have independently spoken with and examined the patient and my documentation of history/physical exam and MDM are as follows:    83 F with PMH HTN, DM, HLD, CAD, MI, PAD, peripheral neuropathy, former smoker, NSCLC with ANTHONY primary tumor and brain metastasis s/p right craniotomy & resection on 7/27/22 and post-op RT (previously on Keytruda but discontinued, last dose October), recent STEMI (10/2022), afib on Eliquis 2.5mg, hx of RML PE presenting to ED for evaluation of elevated LFTs.  Patient reports chronic decreased appetite and skin itching, denies new symptoms.  No fever, chest pain, shortness breath, cough, abdominal pain, GI symptoms, diarrhea.  On exam, patient no acute distress, mildly jaundiced with icteric sclera, abdomen soft nontender, no pedal edema.    MDM–elderly female with multiple comorbidities presenting with worsening outpatient LFTs, will repeat labs and obtain CT imaging to evaluate liver and assess for surgical pathology. Consult GI.    EKG sinus rhythm 76, normal axis, normal intervals, Q waves and slight TWI in inferior and precordial anterolateral leads, no acute ischemia. Similar to previous EKG on 7/16/23 Attending MD SANTOSH Pat- This was a shared visit with JOHN.  I have reviewed and discussed the case with the JOHN and agree with verified documentation unless otherwise documented.  I have independently spoken with and examined the patient and my documentation of history/physical exam and MDM are as follows:    83 F with PMH HTN, DM, HLD, CAD, MI, PAD, peripheral neuropathy, former smoker, NSCLC with ANTHONY primary tumor and brain metastasis s/p right craniotomy & resection on 7/27/22 and post-op RT (previously on Keytruda but discontinued, last dose October), recent STEMI (10/2022), afib on Eliquis 2.5mg, hx of RML PE presenting to ED for evaluation of elevated LFTs.  Patient reports chronic decreased appetite and skin itching, denies new symptoms.  No fever, chest pain, shortness breath, cough, abdominal pain, GI symptoms, diarrhea.  On exam, patient no acute distress, mildly jaundiced with icteric sclera, abdomen soft nontender, no pedal edema.    MDM–elderly female with multiple comorbidities presenting with worsening outpatient LFTs, will repeat labs and obtain CT imaging to evaluate liver and assess for surgical pathology. Consult GI.    EKG sinus rhythm 76, normal axis, normal intervals, Q waves and slight TWI in inferior and precordial anterolateral leads, no acute ischemia. Similar to previous EKG on 7/16/23

## 2023-12-09 NOTE — ED PROVIDER NOTE - OBJECTIVE STATEMENT
83-year-old female with past medical history of hypertension, hyperlipidemia, CAD status post MI, PAD, A-fib on Eliquis, DM, metastatic lung CA with known brain mets status post craniectomy previously on Keytruda which was recently stopped presents emergency department for increasing liver function testing.  Per patient and son over the last month patient has had up-trending of her liver function.  She was referred to hepatology whom she been following with and was recommended to come to the emergency department after lab test on 12/7 revealed up-trending AST, ALT and bilirubin.  Patient reports that since her previous to me she has had poor appetite, fatigue and itching to her skin which is unchanged.  She denies headaches, dizziness, chest pain, shortness of breath, abdominal pain nausea or vomiting.

## 2023-12-09 NOTE — ED PROCEDURE NOTE - NS_EDPROVIDERDISPOUSERTYPE_ED_A_ED
Pt discharged home with parent/guardian. Pt acting age appropriately, respirations regular and unlabored, cap refill less than two seconds. Skin pink, dry and warm. Lungs clear bilaterally. No further complaints at this time. Parent/guardian verbalized understanding of discharge paperwork and has no further questions at this time. Education provided about continuation of care, follow up care and medication administration (amoxicillin, motrin). Parent/guardian able to provided teach back about discharge instructions. Attending Attestation (For Attendings USE Only)...

## 2023-12-10 LAB
A1C WITH ESTIMATED AVERAGE GLUCOSE RESULT: 7.8 % — HIGH (ref 4–5.6)
A1C WITH ESTIMATED AVERAGE GLUCOSE RESULT: 7.8 % — HIGH (ref 4–5.6)
ALBUMIN SERPL ELPH-MCNC: 3.3 G/DL — SIGNIFICANT CHANGE UP (ref 3.3–5)
ALBUMIN SERPL ELPH-MCNC: 3.3 G/DL — SIGNIFICANT CHANGE UP (ref 3.3–5)
ALP SERPL-CCNC: 1077 U/L — HIGH (ref 40–120)
ALP SERPL-CCNC: 1077 U/L — HIGH (ref 40–120)
ALT FLD-CCNC: 763 U/L — HIGH (ref 10–45)
ALT FLD-CCNC: 763 U/L — HIGH (ref 10–45)
AMMONIA BLD-MCNC: 59 UMOL/L — HIGH (ref 11–55)
AMMONIA BLD-MCNC: 59 UMOL/L — HIGH (ref 11–55)
AMPHET UR-MCNC: NEGATIVE — SIGNIFICANT CHANGE UP
AMPHET UR-MCNC: NEGATIVE — SIGNIFICANT CHANGE UP
ANION GAP SERPL CALC-SCNC: 14 MMOL/L — SIGNIFICANT CHANGE UP (ref 5–17)
AST SERPL-CCNC: 1080 U/L — HIGH (ref 10–40)
AST SERPL-CCNC: 1080 U/L — HIGH (ref 10–40)
BARBITURATES UR SCN-MCNC: NEGATIVE — SIGNIFICANT CHANGE UP
BARBITURATES UR SCN-MCNC: NEGATIVE — SIGNIFICANT CHANGE UP
BENZODIAZ UR-MCNC: NEGATIVE — SIGNIFICANT CHANGE UP
BENZODIAZ UR-MCNC: NEGATIVE — SIGNIFICANT CHANGE UP
BILIRUB SERPL-MCNC: 5 MG/DL — HIGH (ref 0.2–1.2)
BILIRUB SERPL-MCNC: 5 MG/DL — HIGH (ref 0.2–1.2)
BUN SERPL-MCNC: 23 MG/DL — SIGNIFICANT CHANGE UP (ref 7–23)
BUN SERPL-MCNC: 23 MG/DL — SIGNIFICANT CHANGE UP (ref 7–23)
BUN SERPL-MCNC: 26 MG/DL — HIGH (ref 7–23)
BUN SERPL-MCNC: 26 MG/DL — HIGH (ref 7–23)
CALCIUM SERPL-MCNC: 8.9 MG/DL — SIGNIFICANT CHANGE UP (ref 8.4–10.5)
CALCIUM SERPL-MCNC: 8.9 MG/DL — SIGNIFICANT CHANGE UP (ref 8.4–10.5)
CALCIUM SERPL-MCNC: 9.5 MG/DL — SIGNIFICANT CHANGE UP (ref 8.4–10.5)
CALCIUM SERPL-MCNC: 9.5 MG/DL — SIGNIFICANT CHANGE UP (ref 8.4–10.5)
CHLORIDE SERPL-SCNC: 102 MMOL/L — SIGNIFICANT CHANGE UP (ref 96–108)
CHLORIDE SERPL-SCNC: 102 MMOL/L — SIGNIFICANT CHANGE UP (ref 96–108)
CHLORIDE SERPL-SCNC: 103 MMOL/L — SIGNIFICANT CHANGE UP (ref 96–108)
CHLORIDE SERPL-SCNC: 103 MMOL/L — SIGNIFICANT CHANGE UP (ref 96–108)
CO2 SERPL-SCNC: 21 MMOL/L — LOW (ref 22–31)
COCAINE METAB.OTHER UR-MCNC: NEGATIVE — SIGNIFICANT CHANGE UP
COCAINE METAB.OTHER UR-MCNC: NEGATIVE — SIGNIFICANT CHANGE UP
CREAT SERPL-MCNC: 1.22 MG/DL — SIGNIFICANT CHANGE UP (ref 0.5–1.3)
CREAT SERPL-MCNC: 1.22 MG/DL — SIGNIFICANT CHANGE UP (ref 0.5–1.3)
CREAT SERPL-MCNC: 1.38 MG/DL — HIGH (ref 0.5–1.3)
CREAT SERPL-MCNC: 1.38 MG/DL — HIGH (ref 0.5–1.3)
EGFR: 38 ML/MIN/1.73M2 — LOW
EGFR: 38 ML/MIN/1.73M2 — LOW
EGFR: 44 ML/MIN/1.73M2 — LOW
EGFR: 44 ML/MIN/1.73M2 — LOW
ESTIMATED AVERAGE GLUCOSE: 177 MG/DL — HIGH (ref 68–114)
ESTIMATED AVERAGE GLUCOSE: 177 MG/DL — HIGH (ref 68–114)
GLUCOSE BLDC GLUCOMTR-MCNC: 149 MG/DL — HIGH (ref 70–99)
GLUCOSE BLDC GLUCOMTR-MCNC: 149 MG/DL — HIGH (ref 70–99)
GLUCOSE BLDC GLUCOMTR-MCNC: 207 MG/DL — HIGH (ref 70–99)
GLUCOSE BLDC GLUCOMTR-MCNC: 207 MG/DL — HIGH (ref 70–99)
GLUCOSE BLDC GLUCOMTR-MCNC: 246 MG/DL — HIGH (ref 70–99)
GLUCOSE BLDC GLUCOMTR-MCNC: 246 MG/DL — HIGH (ref 70–99)
GLUCOSE BLDC GLUCOMTR-MCNC: 253 MG/DL — HIGH (ref 70–99)
GLUCOSE BLDC GLUCOMTR-MCNC: 253 MG/DL — HIGH (ref 70–99)
GLUCOSE SERPL-MCNC: 156 MG/DL — HIGH (ref 70–99)
GLUCOSE SERPL-MCNC: 156 MG/DL — HIGH (ref 70–99)
GLUCOSE SERPL-MCNC: 225 MG/DL — HIGH (ref 70–99)
GLUCOSE SERPL-MCNC: 225 MG/DL — HIGH (ref 70–99)
HAV IGM SER-ACNC: SIGNIFICANT CHANGE UP
HAV IGM SER-ACNC: SIGNIFICANT CHANGE UP
HBV CORE IGM SER-ACNC: SIGNIFICANT CHANGE UP
HBV CORE IGM SER-ACNC: SIGNIFICANT CHANGE UP
HBV SURFACE AG SER-ACNC: SIGNIFICANT CHANGE UP
HBV SURFACE AG SER-ACNC: SIGNIFICANT CHANGE UP
HCT VFR BLD CALC: 32.6 % — LOW (ref 34.5–45)
HCT VFR BLD CALC: 32.6 % — LOW (ref 34.5–45)
HCV AB S/CO SERPL IA: 0.04 S/CO — SIGNIFICANT CHANGE UP (ref 0–0.99)
HCV AB S/CO SERPL IA: 0.04 S/CO — SIGNIFICANT CHANGE UP (ref 0–0.99)
HCV AB SERPL-IMP: SIGNIFICANT CHANGE UP
HCV AB SERPL-IMP: SIGNIFICANT CHANGE UP
HGB BLD-MCNC: 11 G/DL — LOW (ref 11.5–15.5)
HGB BLD-MCNC: 11 G/DL — LOW (ref 11.5–15.5)
MAGNESIUM SERPL-MCNC: 1.7 MG/DL — SIGNIFICANT CHANGE UP (ref 1.6–2.6)
MAGNESIUM SERPL-MCNC: 1.7 MG/DL — SIGNIFICANT CHANGE UP (ref 1.6–2.6)
MAGNESIUM SERPL-MCNC: 2.1 MG/DL — SIGNIFICANT CHANGE UP (ref 1.6–2.6)
MAGNESIUM SERPL-MCNC: 2.1 MG/DL — SIGNIFICANT CHANGE UP (ref 1.6–2.6)
MCHC RBC-ENTMCNC: 30.7 PG — SIGNIFICANT CHANGE UP (ref 27–34)
MCHC RBC-ENTMCNC: 30.7 PG — SIGNIFICANT CHANGE UP (ref 27–34)
MCHC RBC-ENTMCNC: 33.7 GM/DL — SIGNIFICANT CHANGE UP (ref 32–36)
MCHC RBC-ENTMCNC: 33.7 GM/DL — SIGNIFICANT CHANGE UP (ref 32–36)
MCV RBC AUTO: 91.1 FL — SIGNIFICANT CHANGE UP (ref 80–100)
MCV RBC AUTO: 91.1 FL — SIGNIFICANT CHANGE UP (ref 80–100)
METHADONE UR-MCNC: NEGATIVE — SIGNIFICANT CHANGE UP
METHADONE UR-MCNC: NEGATIVE — SIGNIFICANT CHANGE UP
NRBC # BLD: 0 /100 WBCS — SIGNIFICANT CHANGE UP (ref 0–0)
NRBC # BLD: 0 /100 WBCS — SIGNIFICANT CHANGE UP (ref 0–0)
OPIATES UR-MCNC: NEGATIVE — SIGNIFICANT CHANGE UP
OPIATES UR-MCNC: NEGATIVE — SIGNIFICANT CHANGE UP
OXYCODONE UR-MCNC: NEGATIVE — SIGNIFICANT CHANGE UP
OXYCODONE UR-MCNC: NEGATIVE — SIGNIFICANT CHANGE UP
PCP SPEC-MCNC: SIGNIFICANT CHANGE UP
PCP SPEC-MCNC: SIGNIFICANT CHANGE UP
PCP UR-MCNC: NEGATIVE — SIGNIFICANT CHANGE UP
PCP UR-MCNC: NEGATIVE — SIGNIFICANT CHANGE UP
PLATELET # BLD AUTO: 226 K/UL — SIGNIFICANT CHANGE UP (ref 150–400)
PLATELET # BLD AUTO: 226 K/UL — SIGNIFICANT CHANGE UP (ref 150–400)
POTASSIUM SERPL-MCNC: 2.9 MMOL/L — CRITICAL LOW (ref 3.5–5.3)
POTASSIUM SERPL-MCNC: 2.9 MMOL/L — CRITICAL LOW (ref 3.5–5.3)
POTASSIUM SERPL-MCNC: 4.1 MMOL/L — SIGNIFICANT CHANGE UP (ref 3.5–5.3)
POTASSIUM SERPL-MCNC: 4.1 MMOL/L — SIGNIFICANT CHANGE UP (ref 3.5–5.3)
POTASSIUM SERPL-SCNC: 2.9 MMOL/L — CRITICAL LOW (ref 3.5–5.3)
POTASSIUM SERPL-SCNC: 2.9 MMOL/L — CRITICAL LOW (ref 3.5–5.3)
POTASSIUM SERPL-SCNC: 4.1 MMOL/L — SIGNIFICANT CHANGE UP (ref 3.5–5.3)
POTASSIUM SERPL-SCNC: 4.1 MMOL/L — SIGNIFICANT CHANGE UP (ref 3.5–5.3)
PROT SERPL-MCNC: 5.8 G/DL — LOW (ref 6–8.3)
PROT SERPL-MCNC: 5.8 G/DL — LOW (ref 6–8.3)
RBC # BLD: 3.58 M/UL — LOW (ref 3.8–5.2)
RBC # BLD: 3.58 M/UL — LOW (ref 3.8–5.2)
RBC # FLD: 14.7 % — HIGH (ref 10.3–14.5)
RBC # FLD: 14.7 % — HIGH (ref 10.3–14.5)
SODIUM SERPL-SCNC: 137 MMOL/L — SIGNIFICANT CHANGE UP (ref 135–145)
SODIUM SERPL-SCNC: 137 MMOL/L — SIGNIFICANT CHANGE UP (ref 135–145)
SODIUM SERPL-SCNC: 138 MMOL/L — SIGNIFICANT CHANGE UP (ref 135–145)
SODIUM SERPL-SCNC: 138 MMOL/L — SIGNIFICANT CHANGE UP (ref 135–145)
THC UR QL: POSITIVE
THC UR QL: POSITIVE
WBC # BLD: 4.83 K/UL — SIGNIFICANT CHANGE UP (ref 3.8–10.5)
WBC # BLD: 4.83 K/UL — SIGNIFICANT CHANGE UP (ref 3.8–10.5)
WBC # FLD AUTO: 4.83 K/UL — SIGNIFICANT CHANGE UP (ref 3.8–10.5)
WBC # FLD AUTO: 4.83 K/UL — SIGNIFICANT CHANGE UP (ref 3.8–10.5)

## 2023-12-10 RX ORDER — POTASSIUM CHLORIDE 20 MEQ
40 PACKET (EA) ORAL ONCE
Refills: 0 | Status: COMPLETED | OUTPATIENT
Start: 2023-12-10 | End: 2023-12-10

## 2023-12-10 RX ORDER — MAGNESIUM SULFATE 500 MG/ML
2 VIAL (ML) INJECTION ONCE
Refills: 0 | Status: COMPLETED | OUTPATIENT
Start: 2023-12-10 | End: 2023-12-10

## 2023-12-10 RX ADMIN — Medication 2: at 13:06

## 2023-12-10 RX ADMIN — Medication 40 MILLIEQUIVALENT(S): at 08:47

## 2023-12-10 RX ADMIN — GABAPENTIN 100 MILLIGRAM(S): 400 CAPSULE ORAL at 12:24

## 2023-12-10 RX ADMIN — APIXABAN 2.5 MILLIGRAM(S): 2.5 TABLET, FILM COATED ORAL at 05:03

## 2023-12-10 RX ADMIN — Medication 50 MILLIGRAM(S): at 05:03

## 2023-12-10 RX ADMIN — Medication 1: at 21:46

## 2023-12-10 RX ADMIN — TRAMADOL HYDROCHLORIDE 50 MILLIGRAM(S): 50 TABLET ORAL at 05:02

## 2023-12-10 RX ADMIN — Medication 1 DROP(S): at 14:21

## 2023-12-10 RX ADMIN — LEVETIRACETAM 1000 MILLIGRAM(S): 250 TABLET, FILM COATED ORAL at 17:37

## 2023-12-10 RX ADMIN — Medication 1 DROP(S): at 05:35

## 2023-12-10 RX ADMIN — APIXABAN 2.5 MILLIGRAM(S): 2.5 TABLET, FILM COATED ORAL at 17:37

## 2023-12-10 RX ADMIN — Medication 50 MILLIGRAM(S): at 17:37

## 2023-12-10 RX ADMIN — TRAMADOL HYDROCHLORIDE 50 MILLIGRAM(S): 50 TABLET ORAL at 06:02

## 2023-12-10 RX ADMIN — TRAMADOL HYDROCHLORIDE 50 MILLIGRAM(S): 50 TABLET ORAL at 22:06

## 2023-12-10 RX ADMIN — GABAPENTIN 300 MILLIGRAM(S): 400 CAPSULE ORAL at 21:46

## 2023-12-10 RX ADMIN — Medication 1000 UNIT(S): at 12:23

## 2023-12-10 RX ADMIN — LEVETIRACETAM 1000 MILLIGRAM(S): 250 TABLET, FILM COATED ORAL at 05:03

## 2023-12-10 RX ADMIN — Medication 25 GRAM(S): at 12:24

## 2023-12-10 RX ADMIN — Medication 1 DROP(S): at 21:46

## 2023-12-10 RX ADMIN — TRAMADOL HYDROCHLORIDE 50 MILLIGRAM(S): 50 TABLET ORAL at 21:46

## 2023-12-10 RX ADMIN — TRAMADOL HYDROCHLORIDE 50 MILLIGRAM(S): 50 TABLET ORAL at 14:22

## 2023-12-10 RX ADMIN — TRAMADOL HYDROCHLORIDE 50 MILLIGRAM(S): 50 TABLET ORAL at 14:52

## 2023-12-10 RX ADMIN — Medication 2: at 10:14

## 2023-12-10 NOTE — PATIENT PROFILE ADULT - FUNCTIONAL ASSESSMENT - BASIC MOBILITY 6.
3-calculated by average/Not able to assess (calculate score using [facility] averaging method)  3-calculated by average/Not able to assess (calculate score using [facility] averaging method)

## 2023-12-10 NOTE — PATIENT PROFILE ADULT - FALL HARM RISK - HARM RISK INTERVENTIONS
Assistance with ambulation/Assistance OOB with selected safe patient handling equipment/Communicate Risk of Fall with Harm to all staff/Discuss with provider need for PT consult/Monitor gait and stability/Provide patient with walking aids - walker, cane, crutches/Reinforce activity limits and safety measures with patient and family/Tailored Fall Risk Interventions/Visual Cue: Yellow wristband and red socks/Bed in lowest position, wheels locked, appropriate side rails in place/Call bell, personal items and telephone in reach/Instruct patient to call for assistance before getting out of bed or chair/Non-slip footwear when patient is out of bed/[place] to call system/Physically safe environment - no spills, clutter or unnecessary equipment/Purposeful Proactive Rounding/Room/bathroom lighting operational, light cord in reach Assistance with ambulation/Assistance OOB with selected safe patient handling equipment/Communicate Risk of Fall with Harm to all staff/Discuss with provider need for PT consult/Monitor gait and stability/Provide patient with walking aids - walker, cane, crutches/Reinforce activity limits and safety measures with patient and family/Tailored Fall Risk Interventions/Visual Cue: Yellow wristband and red socks/Bed in lowest position, wheels locked, appropriate side rails in place/Call bell, personal items and telephone in reach/Instruct patient to call for assistance before getting out of bed or chair/Non-slip footwear when patient is out of bed/[place] to call system/Physically safe environment - no spills, clutter or unnecessary equipment/Purposeful Proactive Rounding/Room/bathroom lighting operational, light cord in reach

## 2023-12-10 NOTE — PROGRESS NOTE ADULT - SUBJECTIVE AND OBJECTIVE BOX
Pt is an 82yo F w/ PMH of HTN, HLD, DM2, CAD c/b MI, Afib on Eliquis, PAD, metastatic lung CA w/ known brain mets s/p craniectomy previously on Keytruda pw elevated liver enzymes noted on outpt labs.     Only symptoms she endorses are feeling weak w/ decreased appetite x1 yr since craniectomy. Went to endo visit about 2-3 weeks ago and was noted to have increased liver enzymes which continued to worsen over that time period, leading ot her presentation to [facility].     Here she denies any HA, dizziness, CP, SOB, abd pain, N/V. Does endorse pruritis that's unchanged. In the ED VSS w/ labs as below w/ elevated liver enzymes. (09 Dec 2023 21:48)    12-10-23 @ 10:32  PAST MEDICAL & SURGICAL HISTORY:  Hypertension      Coronary artery disease      Hyperlipidemia      Peripheral artery disease      Myocardial infarct      Peripheral neuropathy      Diabetes      Chronic back pain      Former smoker      Non-small cell lung cancer      PAD (peripheral artery disease)      CAD (coronary artery disease)  stents          Review of Systems:   CONSTITUTIONAL: No fever, weight loss, or fatigue  EYES: No eye pain, visual disturbances, or discharge  ENMT:  No difficulty hearing, tinnitus, vertigo; No sinus or throat pain  NECK: No pain or stiffness  BREASTS: No pain, masses, or nipple discharge  RESPIRATORY: No cough, wheezing, chills or hemoptysis; No shortness of breath  CARDIOVASCULAR: No chest pain, palpitations, dizziness, or leg swelling  GASTROINTESTINAL: No abdominal or epigastric pain. No nausea, vomiting, or hematemesis; No diarrhea or constipation. No melena or hematochezia.  GENITOURINARY: No dysuria, frequency, hematuria, or incontinence  NEUROLOGICAL: No headaches, memory loss, loss of strength, numbness, or tremors  SKIN: No itching, burning, rashes, or lesions   LYMPH NODES: No enlarged glands  ENDOCRINE: No heat or cold intolerance; No hair loss  MUSCULOSKELETAL: No joint pain or swelling; No muscle, back, or extremity pain  PSYCHIATRIC: No depression, anxiety, mood swings, or difficulty sleeping  HEME/LYMPH: No easy bruising, or bleeding gums  ALLERY AND IMMUNOLOGIC: No hives or eczema    Allergies    No Known Drug Allergies  adhesives (Pruritus)    Intolerances        Social History:     FAMILY HISTORY:  Family history of stroke (Sibling)        MEDICATIONS  (STANDING):  apixaban 2.5 milliGRAM(s) Oral every 12 hours  artificial tears (preservative free) Ophthalmic Solution 1 Drop(s) Both EYES three times a day  cholecalciferol 1000 Unit(s) Oral daily  dextrose 5%. 1000 milliLiter(s) (50 mL/Hr) IV Continuous <Continuous>  dextrose 5%. 1000 milliLiter(s) (100 mL/Hr) IV Continuous <Continuous>  dextrose 50% Injectable 25 Gram(s) IV Push once  dextrose 50% Injectable 25 Gram(s) IV Push once  dextrose 50% Injectable 12.5 Gram(s) IV Push once  gabapentin 300 milliGRAM(s) Oral at bedtime  gabapentin 100 milliGRAM(s) Oral daily  glucagon  Injectable 1 milliGRAM(s) IntraMuscular once  insulin lispro (ADMELOG) corrective regimen sliding scale   SubCutaneous at bedtime  insulin lispro (ADMELOG) corrective regimen sliding scale   SubCutaneous three times a day before meals  levETIRAcetam 1000 milliGRAM(s) Oral two times a day  metoprolol tartrate 50 milliGRAM(s) Oral two times a day  traMADol 50 milliGRAM(s) Oral three times a day    MEDICATIONS  (PRN):  dextrose Oral Gel 15 Gram(s) Oral once PRN Blood Glucose LESS THAN 70 milliGRAM(s)/deciliter      Vital Signs Last 24 Hrs  T(C): 36.3 (10 Dec 2023 04:52), Max: 37.1 (10 Dec 2023 00:20)  T(F): 97.4 (10 Dec 2023 04:52), Max: 98.7 (10 Dec 2023 00:20)  HR: 66 (10 Dec 2023 04:52) (57 - 74)  BP: 122/75 (10 Dec 2023 04:52) (111/63 - 144/64)  BP(mean): 94 (09 Dec 2023 14:07) (94 - 94)  RR: 17 (10 Dec 2023 04:52) (15 - 18)  SpO2: 94% (10 Dec 2023 04:52) (94% - 98%)    Parameters below as of 10 Dec 2023 04:52  Patient On (Oxygen Delivery Method): room air      CAPILLARY BLOOD GLUCOSE      POCT Blood Glucose.: 246 mg/dL (10 Dec 2023 10:10)  POCT Blood Glucose.: 258 mg/dL (09 Dec 2023 21:51)  POCT Blood Glucose.: 209 mg/dL (09 Dec 2023 18:43)    I&O's Summary    09 Dec 2023 07:01  -  10 Dec 2023 07:00  --------------------------------------------------------  IN: 90 mL / OUT: 600 mL / NET: -510 mL    10 Dec 2023 07:01  -  10 Dec 2023 10:32  --------------------------------------------------------  IN: 0 mL / OUT: 400 mL / NET: -400 mL        PHYSICAL EXAM:  GENERAL: NAD, well-developed  HEAD:  Atraumatic, Normocephalic  EYES: EOMI, PERRLA, conjunctiva and sclera clear  NECK: Supple, No JVD  CHEST/LUNG: Clear to auscultation bilaterally; No wheeze  HEART: Regular rate and rhythm; No murmurs, rubs, or gallops  ABDOMEN: Soft, Nontender, Nondistended; Bowel sounds present  EXTREMITIES:  2+ Peripheral Pulses, No clubbing, cyanosis, or edema  PSYCH: AAOx3  NEUROLOGY: non-focal  SKIN: No rashes or lesions    LABS:                        11.0   4.83  )-----------( 226      ( 10 Dec 2023 07:32 )             32.6     12-10    137  |  102  |  26<H>  ----------------------------<  225<H>  2.9<LL>   |  21<L>  |  1.38<H>    Ca    8.9      10 Dec 2023 07:32  Mg     1.7     12-10    TPro  5.8<L>  /  Alb  3.3  /  TBili  5.0<H>  /  DBili  x   /  AST  1080<H>  /  ALT  763<H>  /  AlkPhos  1077<H>  12-10    PT/INR - ( 09 Dec 2023 08:27 )   PT: 16.0 sec;   INR: 1.47 ratio         PTT - ( 09 Dec 2023 08:27 )  PTT:38.9 sec      Urinalysis Basic - ( 10 Dec 2023 07:32 )    Color: x / Appearance: x / SG: x / pH: x  Gluc: 225 mg/dL / Ketone: x  / Bili: x / Urobili: x   Blood: x / Protein: x / Nitrite: x   Leuk Esterase: x / RBC: x / WBC x   Sq Epi: x / Non Sq Epi: x / Bacteria: x        RADIOLOGY & ADDITIONAL TESTS:    Imaging Personally Reviewed:    Consultant(s) Notes Reviewed:      Care Discussed with Consultants/Other Providers:   Pt is an 82yo F w/ PMH of HTN, HLD, DM2, CAD c/b MI, Afib on Eliquis, PAD, metastatic lung CA w/ known brain mets s/p craniectomy previously on Keytruda pw elevated liver enzymes noted on outpt labs.     Only symptoms she endorses are feeling weak w/ decreased appetite x1 yr since craniectomy. Went to endo visit about 2-3 weeks ago and was noted to have increased liver enzymes which continued to worsen over that time period, leading ot her presentation to [facility].     Here she denies any HA, dizziness, CP, SOB, abd pain, N/V. Does endorse pruritis that's unchanged. In the ED VSS w/ labs as below w/ elevated liver enzymes. (09 Dec 2023 21:48)    12-10-23 @ 10:32  PAST MEDICAL & SURGICAL HISTORY:  Hypertension      Coronary artery disease      Hyperlipidemia      Peripheral artery disease      Myocardial infarct      Peripheral neuropathy      Diabetes      Chronic back pain      Former smoker      Non-small cell lung cancer      PAD (peripheral artery disease)      CAD (coronary artery disease)  stents          Review of Systems:   CONSTITUTIONAL: No fever, weight loss, or fatigue  EYES: No eye pain, visual disturbances, or discharge  ENMT:  No difficulty hearing, tinnitus, vertigo; No sinus or throat pain  NECK: No pain or stiffness  BREASTS: No pain, masses, or nipple discharge  RESPIRATORY: No cough, wheezing, chills or hemoptysis; No shortness of breath  CARDIOVASCULAR: No chest pain, palpitations, dizziness, or leg swelling  GASTROINTESTINAL: No abdominal or epigastric pain. No nausea, vomiting, or hematemesis; No diarrhea or constipation. No melena or hematochezia.  GENITOURINARY: No dysuria, frequency, hematuria, or incontinence  NEUROLOGICAL: No headaches, memory loss, loss of strength, numbness, or tremors  SKIN: No itching, burning, rashes, or lesions   LYMPH NODES: No enlarged glands  ENDOCRINE: No heat or cold intolerance; No hair loss  MUSCULOSKELETAL: No joint pain or swelling; No muscle, back, or extremity pain  PSYCHIATRIC: No depression, anxiety, mood swings, or difficulty sleeping  HEME/LYMPH: No easy bruising, or bleeding gums  ALLERY AND IMMUNOLOGIC: No hives or eczema    Allergies    No Known Drug Allergies  adhesives (Pruritus)    Intolerances        Social History:     FAMILY HISTORY:  Family history of stroke (Sibling)        MEDICATIONS  (STANDING):  apixaban 2.5 milliGRAM(s) Oral every 12 hours  artificial tears (preservative free) Ophthalmic Solution 1 Drop(s) Both EYES three times a day  cholecalciferol 1000 Unit(s) Oral daily  dextrose 5%. 1000 milliLiter(s) (50 mL/Hr) IV Continuous <Continuous>  dextrose 5%. 1000 milliLiter(s) (100 mL/Hr) IV Continuous <Continuous>  dextrose 50% Injectable 25 Gram(s) IV Push once  dextrose 50% Injectable 25 Gram(s) IV Push once  dextrose 50% Injectable 12.5 Gram(s) IV Push once  gabapentin 300 milliGRAM(s) Oral at bedtime  gabapentin 100 milliGRAM(s) Oral daily  glucagon  Injectable 1 milliGRAM(s) IntraMuscular once  insulin lispro (ADMELOG) corrective regimen sliding scale   SubCutaneous at bedtime  insulin lispro (ADMELOG) corrective regimen sliding scale   SubCutaneous three times a day before meals  levETIRAcetam 1000 milliGRAM(s) Oral two times a day  metoprolol tartrate 50 milliGRAM(s) Oral two times a day  traMADol 50 milliGRAM(s) Oral three times a day    MEDICATIONS  (PRN):  dextrose Oral Gel 15 Gram(s) Oral once PRN Blood Glucose LESS THAN 70 milliGRAM(s)/deciliter      Vital Signs Last 24 Hrs  T(C): 36.3 (10 Dec 2023 04:52), Max: 37.1 (10 Dec 2023 00:20)  T(F): 97.4 (10 Dec 2023 04:52), Max: 98.7 (10 Dec 2023 00:20)  HR: 66 (10 Dec 2023 04:52) (57 - 74)  BP: 122/75 (10 Dec 2023 04:52) (111/63 - 144/64)  BP(mean): 94 (09 Dec 2023 14:07) (94 - 94)  RR: 17 (10 Dec 2023 04:52) (15 - 18)  SpO2: 94% (10 Dec 2023 04:52) (94% - 98%)    Parameters below as of 10 Dec 2023 04:52  Patient On (Oxygen Delivery Method): room air      CAPILLARY BLOOD GLUCOSE      POCT Blood Glucose.: 246 mg/dL (10 Dec 2023 10:10)  POCT Blood Glucose.: 258 mg/dL (09 Dec 2023 21:51)  POCT Blood Glucose.: 209 mg/dL (09 Dec 2023 18:43)    I&O's Summary    09 Dec 2023 07:01  -  10 Dec 2023 07:00  --------------------------------------------------------  IN: 90 mL / OUT: 600 mL / NET: -510 mL    10 Dec 2023 07:01  -  10 Dec 2023 10:32  --------------------------------------------------------  IN: 0 mL / OUT: 400 mL / NET: -400 mL        PHYSICAL EXAM:  GENERAL: NAD, well-developed  HEAD:  Atraumatic, Normocephalic  EYES: EOMI, PERRLA, conjunctiva and sclera clear  NECK: Supple, No JVD  CHEST/LUNG: Clear to auscultation bilaterally; No wheeze  HEART: Regular rate and rhythm; No murmurs, rubs, or gallops  ABDOMEN: Soft, Nontender, Nondistended; Bowel sounds present  EXTREMITIES:  2+ Peripheral Pulses, No clubbing, cyanosis, or edema  PSYCH: AAOx3  NEUROLOGY: non-focal  SKIN: No rashes or lesions    LABS:                        11.0   4.83  )-----------( 226      ( 10 Dec 2023 07:32 )             32.6     12-10    137  |  102  |  26<H>  ----------------------------<  225<H>  2.9<LL>   |  21<L>  |  1.38<H>    Ca    8.9      10 Dec 2023 07:32  Mg     1.7     12-10    TPro  5.8<L>  /  Alb  3.3  /  TBili  5.0<H>  /  DBili  x   /  AST  1080<H>  /  ALT  763<H>  /  AlkPhos  1077<H>  12-10    PT/INR - ( 09 Dec 2023 08:27 )   PT: 16.0 sec;   INR: 1.47 ratio         PTT - ( 09 Dec 2023 08:27 )  PTT:38.9 sec      Urinalysis Basic - ( 10 Dec 2023 07:32 )    Color: x / Appearance: x / SG: x / pH: x  Gluc: 225 mg/dL / Ketone: x  / Bili: x / Urobili: x   Blood: x / Protein: x / Nitrite: x   Leuk Esterase: x / RBC: x / WBC x   Sq Epi: x / Non Sq Epi: x / Bacteria: x        RADIOLOGY & ADDITIONAL TESTS:    Imaging Personally Reviewed:    Consultant(s) Notes Reviewed:      Care Discussed with Consultants/Other Providers:

## 2023-12-10 NOTE — PROGRESS NOTE ADULT - SUBJECTIVE AND OBJECTIVE BOX
CARDIOLOGY FOLLOW UP - Dr. Alcaraz  Date of Service: 12/10/2023  CC: no cp/sob    Review of Systems:  Constitutional: No fever, weight loss, or fatigue  Respiratory: No cough, wheezing, or hemoptysis, no shortness of breath  Cardiovascular: No chest pain, palpitations, passing out, dizziness, or leg swelling  Gastrointestinal: No abd or epigastric pain. No nausea, vomiting, or hematemesis; no diarrhea or consiptaiton, no melena or hematochezia  Vascular: No edema     TELEMETRY:    PHYSICAL EXAM:  T(C): 36.3 (12-10-23 @ 04:52), Max: 37.1 (12-10-23 @ 00:20)  HR: 66 (12-10-23 @ 04:52) (57 - 81)  BP: 122/75 (12-10-23 @ 04:52) (89/77 - 144/64)  RR: 17 (12-10-23 @ 04:52) (15 - 18)  SpO2: 94% (12-10-23 @ 04:52) (94% - 98%)  Wt(kg): --  I&O's Summary    09 Dec 2023 07:01  -  10 Dec 2023 07:00  --------------------------------------------------------  IN: 90 mL / OUT: 600 mL / NET: -510 mL        Appearance: Normal	  Cardiovascular: Normal S1 S2,RRR, No JVD, No murmurs  Respiratory: Lungs clear to auscultation	  Gastrointestinal:  Soft, Non-tender, + BS	  Extremities: Normal range of motion, No clubbing, cyanosis or edema  Vascular: Peripheral pulses palpable 2+ bilaterally       Home Medications:  gabapentin 100 mg oral capsule: 1 cap(s) orally once a day (in the morning) (09 Dec 2023 15:06)  gabapentin 300 mg oral capsule: 1 cap(s) orally once a day (in the evening) (09 Dec 2023 15:06)  metFORMIN 1000 mg oral tablet: 1 tab(s) orally 2 times a day (09 Dec 2023 15:03)  metoprolol tartrate 50 mg oral tablet: 1 tab(s) orally 2 times a day (09 Dec 2023 15:03)  Ozempic 2 mg/1.5 mL (0.25 mg or 0.5 mg dose) subcutaneous solution: 0.25 milligram(s) subcutaneously once a week on tuesday (09 Dec 2023 15:04)  traMADol 50 mg oral tablet: 1 tab(s) orally 3 times a day (09 Dec 2023 15:04)  Tresiba FlexTouch 100 units/mL subcutaneous solution: 14 unit(s) subcutaneous once a day (09 Dec 2023 15:04)  Vitamin B-12: 1 tab once a day (09 Dec 2023 15:04)  Vitamin D3 25 mcg (1000 intl units) oral tablet: 1 tab(s) orally once a day (09 Dec 2023 15:04)        MEDICATIONS  (STANDING):  apixaban 2.5 milliGRAM(s) Oral every 12 hours  artificial tears (preservative free) Ophthalmic Solution 1 Drop(s) Both EYES three times a day  cholecalciferol 1000 Unit(s) Oral daily  dextrose 5%. 1000 milliLiter(s) (50 mL/Hr) IV Continuous <Continuous>  dextrose 5%. 1000 milliLiter(s) (100 mL/Hr) IV Continuous <Continuous>  dextrose 50% Injectable 12.5 Gram(s) IV Push once  dextrose 50% Injectable 25 Gram(s) IV Push once  dextrose 50% Injectable 25 Gram(s) IV Push once  gabapentin 300 milliGRAM(s) Oral at bedtime  gabapentin 100 milliGRAM(s) Oral daily  glucagon  Injectable 1 milliGRAM(s) IntraMuscular once  insulin lispro (ADMELOG) corrective regimen sliding scale   SubCutaneous three times a day before meals  insulin lispro (ADMELOG) corrective regimen sliding scale   SubCutaneous at bedtime  levETIRAcetam 1000 milliGRAM(s) Oral two times a day  metoprolol tartrate 50 milliGRAM(s) Oral two times a day  traMADol 50 milliGRAM(s) Oral three times a day        EKG:  RADIOLOGY:  DIAGNOSTIC TESTING:  [ ] Echocardiogram:  [ ] Catherterization:  [ ] Stress Test:  OTHER:     LABS:	 	                          11.0   4.83  )-----------( 226      ( 10 Dec 2023 07:32 )             32.6     12-09    137  |  97  |  34<H>  ----------------------------<  206<H>  3.9   |  24  |  1.50<H>    Ca    9.4      09 Dec 2023 08:27  Mg     1.5     12-09    TPro  6.8  /  Alb  3.5  /  TBili  5.9<H>  /  DBili  4.6<H>  /  AST  1481<H>  /  ALT  929<H>  /  AlkPhos  1331<H>  12-09      PT/INR - ( 09 Dec 2023 08:27 )   PT: 16.0 sec;   INR: 1.47 ratio         PTT - ( 09 Dec 2023 08:27 )  PTT:38.9 sec    CARDIAC MARKERS:

## 2023-12-10 NOTE — PROVIDER CONTACT NOTE (CRITICAL VALUE NOTIFICATION) - TEST AND RESULT REPORTED:
Emmy Marino 3/5/2018 10:30 AM CST    This message was received as a [facility] message from the patient. Please review the message and respond to the patient.     ----- Message -----  From: Asha Hollis  Sent: 3/5/2018 10:29 AM  To: [facility] Default Message Pool  Subject: RE:Urine Culture     thank you, but is there an explanation as to why there are white blood cells and blood in my urine? This is the second time this has happened and the second time the culture has come back as negative.   ----- Message -----  From: Alicia WESTON  Sent: 3/5/2018 9:38 AM CST  To: Asha Hollis  Subject: Urine Culture  Good Morning Asha,    Your Urine Culture results are NEGATIVE. If you have any questions or concerns, please feel free to contact our office at [phone].    Thank you and have a good day!      
Potassium  2.9

## 2023-12-10 NOTE — PROGRESS NOTE ADULT - ASSESSMENT
ECHO 10/3/22: EF 40-45%. Akinesis of the inferior with hypokinesis of the inferolateral, septum and anterolateral hypokinetic.    A/p  83y  woman with a PMHx significant for HTN, DM, HLD, CAD, MI, PAD, peripheral neuropathy, former smoker, NSCLC with ANTHONY primary tumor and brain metastasis s/p right craniotomy & resection on 7/27/22 and post-op RT on active treatment, STEMI (10/2022), afib on eliquis, hx of RML PE presenting with elevated lfts     #elevated lfts   -work up per hepatology  -? med induced   -hepatology eval noted  -CT abd/pelv noted     #Paroxysmal atrial fibrillation  -cont apixaban   -cont metoprolol    #hypertension  -stable  -cont bb    #Hx NSTEMI, CAD, s/p PCI  -STEMI in 8/2022 treated medically at the time in setting of recent craniotomy  -No chest pain or sob  -no statins given lfts  -can check echo     #Metast. Lung cancer to brain.   -s/p right craniotomy for resection of brain mass 7/27/22  -Heme/onc eval     #history of PE - dx in AUG 2022   -cont ac    35 minutes spent on total encounter; more than 50% of the visit was spent counseling and/or coordinating care by the attending physician.       3

## 2023-12-10 NOTE — PROGRESS NOTE ADULT - ASSESSMENT
84yo F w/ PMH of HTN, HLD, DM2, CAD c/b MI, Afib on Eliquis, PAD, metastatic lung CA w/ known brain mets s/p craniectomy previously on Keytruda pw elevated liver enzymes noted on outpt labs       Problem/Plan - 1:  ·  Problem: Hepatocellular injury.   ·  Plan: - ddx includes DILI 2/2 Keytruda vs less likely Keppra as has been on for a year vs AI vs less likely ischemic   - No longer on Keytruda  - US unremarkable   - f/u hepatitis panel and other hepatitis w/u ordered  - Order AI w/u  - MR Abdomen w/ IV   - Infectious w/u   -  MRI Abdomen WIC  - acute viral hepatitis panel, plus HBV PCR, HCV PCR, HEV IgM/IgG  - ASMA, AMA, LKM  - trend LFTs Q8H  - full infectious workup  - UTox  - monitor mental status; if any signs of encephalopathy, please call GI fellow on call and start lactulose 20g TID with goal 3-5 BMs per day  - collect ammonia level       Problem/Plan - 2:  ·  Problem: Chronic atrial fibrillation.   ·  Plan: - On Eliquis 2.5mg BID, continue   - c/w Lopressor 50mg BID.     Problem/Plan - 3:  ·  Problem: Type 2 diabetes mellitus.   ·  Plan: - f/u AM A1c  - SALAS for now  - CC diet  - Diabetic education.     Problem/Plan - 4:  ·  Problem: HTN (hypertension).   ·  Plan: - c/w home medications w/ hold parameters.     Problem/Plan - 5:  ·  Problem: HLD (hyperlipidemia).   ·  Plan: - No statin given elevated liver enzymes.     Problem/Plan - 6:  ·  Problem: CAD (coronary artery disease).   ·  Plan: - On Eliquis.     Problem/Plan - 7:  ·  Problem: Primary lung cancer with metastasis from lung to other site.   ·  Plan: - Holding Keytruda  - c/w Keppra for brain mets.     Problem/Plan - 8:  ·  Problem: Prophylactic measure.   ·  Plan: DVT ppx: On Eliquis  Diet: DASH/CC  Dispo: pending clinical improvement.   82yo F w/ PMH of HTN, HLD, DM2, CAD c/b MI, Afib on Eliquis, PAD, metastatic lung CA w/ known brain mets s/p craniectomy previously on Keytruda pw elevated liver enzymes noted on outpt labs       Problem/Plan - 1:  ·  Problem: Hepatocellular injury.   ·  Plan: - ddx includes DILI 2/2 Keytruda vs less likely Keppra as has been on for a year vs AI vs less likely ischemic   - No longer on Keytruda  - US unremarkable   - f/u hepatitis panel and other hepatitis w/u ordered  - Order AI w/u  - MR Abdomen w/ IV   - Infectious w/u   -  MRI Abdomen WIC  - acute viral hepatitis panel, plus HBV PCR, HCV PCR, HEV IgM/IgG  - ASMA, AMA, LKM  - trend LFTs Q8H  - full infectious workup  - UTox  - monitor mental status; if any signs of encephalopathy, please call GI fellow on call and start lactulose 20g TID with goal 3-5 BMs per day  - collect ammonia level       Problem/Plan - 2:  ·  Problem: Chronic atrial fibrillation.   ·  Plan: - On Eliquis 2.5mg BID, continue   - c/w Lopressor 50mg BID.     Problem/Plan - 3:  ·  Problem: Type 2 diabetes mellitus.   ·  Plan: - f/u AM A1c  - SALAS for now  - CC diet  - Diabetic education.     Problem/Plan - 4:  ·  Problem: HTN (hypertension).   ·  Plan: - c/w home medications w/ hold parameters.     Problem/Plan - 5:  ·  Problem: HLD (hyperlipidemia).   ·  Plan: - No statin given elevated liver enzymes.     Problem/Plan - 6:  ·  Problem: CAD (coronary artery disease).   ·  Plan: - On Eliquis.     Problem/Plan - 7:  ·  Problem: Primary lung cancer with metastasis from lung to other site.   ·  Plan: - Holding Keytruda  - c/w Keppra for brain mets.     Problem/Plan - 8:  ·  Problem: Prophylactic measure.   ·  Plan: DVT ppx: On Eliquis  Diet: DASH/CC  Dispo: pending clinical improvement.

## 2023-12-11 LAB
ALBUMIN SERPL ELPH-MCNC: 2.9 G/DL — LOW (ref 3.3–5)
ALBUMIN SERPL ELPH-MCNC: 2.9 G/DL — LOW (ref 3.3–5)
ALP SERPL-CCNC: 1037 U/L — HIGH (ref 40–120)
ALP SERPL-CCNC: 1037 U/L — HIGH (ref 40–120)
ALT FLD-CCNC: 681 U/L — HIGH (ref 10–45)
ALT FLD-CCNC: 681 U/L — HIGH (ref 10–45)
ANION GAP SERPL CALC-SCNC: 11 MMOL/L — SIGNIFICANT CHANGE UP (ref 5–17)
ANION GAP SERPL CALC-SCNC: 11 MMOL/L — SIGNIFICANT CHANGE UP (ref 5–17)
AST SERPL-CCNC: 978 U/L — HIGH (ref 10–40)
AST SERPL-CCNC: 978 U/L — HIGH (ref 10–40)
BILIRUB DIRECT SERPL-MCNC: 3.6 MG/DL — HIGH (ref 0–0.3)
BILIRUB DIRECT SERPL-MCNC: 3.6 MG/DL — HIGH (ref 0–0.3)
BILIRUB INDIRECT FLD-MCNC: 1.2 MG/DL — HIGH (ref 0.2–1)
BILIRUB INDIRECT FLD-MCNC: 1.2 MG/DL — HIGH (ref 0.2–1)
BILIRUB SERPL-MCNC: 4.8 MG/DL — HIGH (ref 0.2–1.2)
BILIRUB SERPL-MCNC: 4.8 MG/DL — HIGH (ref 0.2–1.2)
BUN SERPL-MCNC: 23 MG/DL — SIGNIFICANT CHANGE UP (ref 7–23)
BUN SERPL-MCNC: 23 MG/DL — SIGNIFICANT CHANGE UP (ref 7–23)
CALCIUM SERPL-MCNC: 8.7 MG/DL — SIGNIFICANT CHANGE UP (ref 8.4–10.5)
CALCIUM SERPL-MCNC: 8.7 MG/DL — SIGNIFICANT CHANGE UP (ref 8.4–10.5)
CHLORIDE SERPL-SCNC: 101 MMOL/L — SIGNIFICANT CHANGE UP (ref 96–108)
CHLORIDE SERPL-SCNC: 101 MMOL/L — SIGNIFICANT CHANGE UP (ref 96–108)
CO2 SERPL-SCNC: 24 MMOL/L — SIGNIFICANT CHANGE UP (ref 22–31)
CO2 SERPL-SCNC: 24 MMOL/L — SIGNIFICANT CHANGE UP (ref 22–31)
CREAT SERPL-MCNC: 1.06 MG/DL — SIGNIFICANT CHANGE UP (ref 0.5–1.3)
CREAT SERPL-MCNC: 1.06 MG/DL — SIGNIFICANT CHANGE UP (ref 0.5–1.3)
EGFR: 52 ML/MIN/1.73M2 — LOW
EGFR: 52 ML/MIN/1.73M2 — LOW
GLUCOSE BLDC GLUCOMTR-MCNC: 125 MG/DL — HIGH (ref 70–99)
GLUCOSE BLDC GLUCOMTR-MCNC: 125 MG/DL — HIGH (ref 70–99)
GLUCOSE BLDC GLUCOMTR-MCNC: 250 MG/DL — HIGH (ref 70–99)
GLUCOSE BLDC GLUCOMTR-MCNC: 250 MG/DL — HIGH (ref 70–99)
GLUCOSE BLDC GLUCOMTR-MCNC: 272 MG/DL — HIGH (ref 70–99)
GLUCOSE BLDC GLUCOMTR-MCNC: 272 MG/DL — HIGH (ref 70–99)
GLUCOSE BLDC GLUCOMTR-MCNC: 300 MG/DL — HIGH (ref 70–99)
GLUCOSE BLDC GLUCOMTR-MCNC: 300 MG/DL — HIGH (ref 70–99)
GLUCOSE BLDC GLUCOMTR-MCNC: 328 MG/DL — HIGH (ref 70–99)
GLUCOSE BLDC GLUCOMTR-MCNC: 328 MG/DL — HIGH (ref 70–99)
GLUCOSE SERPL-MCNC: 243 MG/DL — HIGH (ref 70–99)
GLUCOSE SERPL-MCNC: 243 MG/DL — HIGH (ref 70–99)
HBV DNA # SERPL NAA+PROBE: SIGNIFICANT CHANGE UP
HBV DNA # SERPL NAA+PROBE: SIGNIFICANT CHANGE UP
HBV DNA SERPL NAA+PROBE-LOG#: SIGNIFICANT CHANGE UP LOGIU/ML
HBV DNA SERPL NAA+PROBE-LOG#: SIGNIFICANT CHANGE UP LOGIU/ML
HCT VFR BLD CALC: 34.3 % — LOW (ref 34.5–45)
HCT VFR BLD CALC: 34.3 % — LOW (ref 34.5–45)
HCV RNA SPEC NAA+PROBE-LOG IU: SIGNIFICANT CHANGE UP
HCV RNA SPEC NAA+PROBE-LOG IU: SIGNIFICANT CHANGE UP
HCV RNA SPEC NAA+PROBE-LOG IU: SIGNIFICANT CHANGE UP LOGIU/ML
HCV RNA SPEC NAA+PROBE-LOG IU: SIGNIFICANT CHANGE UP LOGIU/ML
HGB BLD-MCNC: 11.5 G/DL — SIGNIFICANT CHANGE UP (ref 11.5–15.5)
HGB BLD-MCNC: 11.5 G/DL — SIGNIFICANT CHANGE UP (ref 11.5–15.5)
MCHC RBC-ENTMCNC: 30.4 PG — SIGNIFICANT CHANGE UP (ref 27–34)
MCHC RBC-ENTMCNC: 30.4 PG — SIGNIFICANT CHANGE UP (ref 27–34)
MCHC RBC-ENTMCNC: 33.5 GM/DL — SIGNIFICANT CHANGE UP (ref 32–36)
MCHC RBC-ENTMCNC: 33.5 GM/DL — SIGNIFICANT CHANGE UP (ref 32–36)
MCV RBC AUTO: 90.7 FL — SIGNIFICANT CHANGE UP (ref 80–100)
MCV RBC AUTO: 90.7 FL — SIGNIFICANT CHANGE UP (ref 80–100)
NRBC # BLD: 0 /100 WBCS — SIGNIFICANT CHANGE UP (ref 0–0)
NRBC # BLD: 0 /100 WBCS — SIGNIFICANT CHANGE UP (ref 0–0)
PLATELET # BLD AUTO: 228 K/UL — SIGNIFICANT CHANGE UP (ref 150–400)
PLATELET # BLD AUTO: 228 K/UL — SIGNIFICANT CHANGE UP (ref 150–400)
POTASSIUM SERPL-MCNC: 3.2 MMOL/L — LOW (ref 3.5–5.3)
POTASSIUM SERPL-MCNC: 3.2 MMOL/L — LOW (ref 3.5–5.3)
POTASSIUM SERPL-SCNC: 3.2 MMOL/L — LOW (ref 3.5–5.3)
POTASSIUM SERPL-SCNC: 3.2 MMOL/L — LOW (ref 3.5–5.3)
PROT SERPL-MCNC: 5.9 G/DL — LOW (ref 6–8.3)
PROT SERPL-MCNC: 5.9 G/DL — LOW (ref 6–8.3)
RBC # BLD: 3.78 M/UL — LOW (ref 3.8–5.2)
RBC # BLD: 3.78 M/UL — LOW (ref 3.8–5.2)
RBC # FLD: 15.2 % — HIGH (ref 10.3–14.5)
RBC # FLD: 15.2 % — HIGH (ref 10.3–14.5)
SODIUM SERPL-SCNC: 136 MMOL/L — SIGNIFICANT CHANGE UP (ref 135–145)
SODIUM SERPL-SCNC: 136 MMOL/L — SIGNIFICANT CHANGE UP (ref 135–145)
WBC # BLD: 5.31 K/UL — SIGNIFICANT CHANGE UP (ref 3.8–10.5)
WBC # BLD: 5.31 K/UL — SIGNIFICANT CHANGE UP (ref 3.8–10.5)
WBC # FLD AUTO: 5.31 K/UL — SIGNIFICANT CHANGE UP (ref 3.8–10.5)
WBC # FLD AUTO: 5.31 K/UL — SIGNIFICANT CHANGE UP (ref 3.8–10.5)

## 2023-12-11 PROCEDURE — 99223 1ST HOSP IP/OBS HIGH 75: CPT | Mod: GC

## 2023-12-11 PROCEDURE — 74183 MRI ABD W/O CNTR FLWD CNTR: CPT | Mod: 26

## 2023-12-11 PROCEDURE — 99232 SBSQ HOSP IP/OBS MODERATE 35: CPT | Mod: GC

## 2023-12-11 PROCEDURE — 93306 TTE W/DOPPLER COMPLETE: CPT | Mod: 26

## 2023-12-11 RX ORDER — POTASSIUM CHLORIDE 20 MEQ
10 PACKET (EA) ORAL
Refills: 0 | Status: DISCONTINUED | OUTPATIENT
Start: 2023-12-11 | End: 2023-12-11

## 2023-12-11 RX ORDER — METFORMIN HYDROCHLORIDE 850 MG/1
1 TABLET ORAL
Refills: 0 | DISCHARGE

## 2023-12-11 RX ORDER — POTASSIUM CHLORIDE 20 MEQ
20 PACKET (EA) ORAL
Refills: 0 | Status: DISCONTINUED | OUTPATIENT
Start: 2023-12-11 | End: 2023-12-11

## 2023-12-11 RX ORDER — SEMAGLUTIDE 0.68 MG/ML
0.25 INJECTION, SOLUTION SUBCUTANEOUS
Refills: 0 | DISCHARGE

## 2023-12-11 RX ORDER — POTASSIUM CHLORIDE 20 MEQ
20 PACKET (EA) ORAL
Refills: 0 | Status: COMPLETED | OUTPATIENT
Start: 2023-12-11 | End: 2023-12-11

## 2023-12-11 RX ADMIN — Medication 1000 UNIT(S): at 12:58

## 2023-12-11 RX ADMIN — APIXABAN 2.5 MILLIGRAM(S): 2.5 TABLET, FILM COATED ORAL at 18:13

## 2023-12-11 RX ADMIN — Medication 50 MILLIGRAM(S): at 18:13

## 2023-12-11 RX ADMIN — TRAMADOL HYDROCHLORIDE 50 MILLIGRAM(S): 50 TABLET ORAL at 21:14

## 2023-12-11 RX ADMIN — Medication 50 MILLIGRAM(S): at 05:36

## 2023-12-11 RX ADMIN — GABAPENTIN 100 MILLIGRAM(S): 400 CAPSULE ORAL at 12:57

## 2023-12-11 RX ADMIN — TRAMADOL HYDROCHLORIDE 50 MILLIGRAM(S): 50 TABLET ORAL at 15:15

## 2023-12-11 RX ADMIN — Medication 2: at 09:23

## 2023-12-11 RX ADMIN — Medication 1 DROP(S): at 15:15

## 2023-12-11 RX ADMIN — Medication 3: at 13:07

## 2023-12-11 RX ADMIN — Medication 1 DROP(S): at 05:37

## 2023-12-11 RX ADMIN — TRAMADOL HYDROCHLORIDE 50 MILLIGRAM(S): 50 TABLET ORAL at 05:37

## 2023-12-11 RX ADMIN — Medication 2: at 21:30

## 2023-12-11 RX ADMIN — Medication 20 MILLIEQUIVALENT(S): at 15:56

## 2023-12-11 RX ADMIN — APIXABAN 2.5 MILLIGRAM(S): 2.5 TABLET, FILM COATED ORAL at 05:37

## 2023-12-11 RX ADMIN — Medication 20 MILLIEQUIVALENT(S): at 18:13

## 2023-12-11 RX ADMIN — GABAPENTIN 300 MILLIGRAM(S): 400 CAPSULE ORAL at 21:14

## 2023-12-11 RX ADMIN — TRAMADOL HYDROCHLORIDE 50 MILLIGRAM(S): 50 TABLET ORAL at 06:05

## 2023-12-11 RX ADMIN — Medication 3: at 18:09

## 2023-12-11 RX ADMIN — Medication 1 DROP(S): at 21:14

## 2023-12-11 RX ADMIN — TRAMADOL HYDROCHLORIDE 50 MILLIGRAM(S): 50 TABLET ORAL at 21:44

## 2023-12-11 RX ADMIN — LEVETIRACETAM 1000 MILLIGRAM(S): 250 TABLET, FILM COATED ORAL at 05:37

## 2023-12-11 RX ADMIN — LEVETIRACETAM 1000 MILLIGRAM(S): 250 TABLET, FILM COATED ORAL at 18:13

## 2023-12-11 NOTE — CONSULT NOTE ADULT - SUBJECTIVE AND OBJECTIVE BOX
HPI:  Pt is an 84yo F w/ PMH of HTN, HLD, DM2, CAD c/b MI, Afib on Eliquis, PAD, metastatic lung CA w/ known brain mets s/p craniectomy previously on Keytruda pw elevated liver enzymes noted on outpt labs.     Only symptoms she endorses are feeling weak w/ decreased appetite x1 yr since craniectomy. Went to endo visit about 2-3 weeks ago and was noted to have increased liver enzymes which continued to worsen over that time period, leading ot her presentation to [facility].     Here she denies any HA, dizziness, CP, SOB, abd pain, N/V. Does endorse pruritis that's unchanged. In the ED VSS w/ labs as below w/ elevated liver enzymes. (09 Dec 2023 21:48)      PAST MEDICAL & SURGICAL HISTORY:  Hypertension    Coronary artery disease    Hyperlipidemia    Peripheral artery disease    Myocardial infarct    Peripheral neuropathy    Diabetes    Chronic back pain    Former smoker    Non-small cell lung cancer    PAD (peripheral artery disease)    CAD (coronary artery disease)  stents      Allergies    No Known Drug Allergies  adhesives (Pruritus)    Intolerances      MEDICATIONS  (STANDING):  apixaban 2.5 milliGRAM(s) Oral every 12 hours  artificial tears (preservative free) Ophthalmic Solution 1 Drop(s) Both EYES three times a day  cholecalciferol 1000 Unit(s) Oral daily  dextrose 5%. 1000 milliLiter(s) (50 mL/Hr) IV Continuous <Continuous>  dextrose 5%. 1000 milliLiter(s) (100 mL/Hr) IV Continuous <Continuous>  dextrose 50% Injectable 12.5 Gram(s) IV Push once  dextrose 50% Injectable 25 Gram(s) IV Push once  dextrose 50% Injectable 25 Gram(s) IV Push once  gabapentin 300 milliGRAM(s) Oral at bedtime  gabapentin 100 milliGRAM(s) Oral daily  glucagon  Injectable 1 milliGRAM(s) IntraMuscular once  insulin lispro (ADMELOG) corrective regimen sliding scale   SubCutaneous three times a day before meals  insulin lispro (ADMELOG) corrective regimen sliding scale   SubCutaneous at bedtime  levETIRAcetam 1000 milliGRAM(s) Oral two times a day  metoprolol tartrate 50 milliGRAM(s) Oral two times a day  potassium chloride    Tablet ER 20 milliEquivalent(s) Oral every 2 hours  traMADol 50 milliGRAM(s) Oral three times a day    MEDICATIONS  (PRN):  dextrose Oral Gel 15 Gram(s) Oral once PRN Blood Glucose LESS THAN 70 milliGRAM(s)/deciliter      FAMILY HISTORY:  Family history of stroke (Sibling)      SOCIAL HISTORY: No EtOH, former tobacco use    REVIEW OF SYSTEMS:    CONSTITUTIONAL: No weakness, fevers or chills  EYES/ENT: No visual changes;  No vertigo or throat pain   NECK: No pain or stiffness  RESPIRATORY: No cough, wheezing, hemoptysis; No shortness of breath  CARDIOVASCULAR: No chest pain or palpitations  GASTROINTESTINAL: +GERD  GENITOURINARY: No dysuria, frequency or hematuria  NEUROLOGICAL: No numbness or weakness  SKIN: No itching, burning, rashes, or lesions   All other review of systems is negative unless indicated above.      T(F): 97.8 (12-11-23 @ 12:57), Max: 98.5 (12-10-23 @ 17:33)  HR: 68 (12-11-23 @ 12:57)  BP: 104/64 (12-11-23 @ 12:57)  RR: 18 (12-11-23 @ 12:57)  SpO2: 93% (12-11-23 @ 12:57)  Wt(kg): --    GENERAL: NAD, sitting in chair  HEAD:  Atraumatic, Normocephalic  EYES: EOMI, conjunctiva and sclera clear  NECK: Supple  CHEST/LUNG: Clear to auscultation bilaterally; No wheeze  HEART: Regular rate and rhythm; No murmurs  ABDOMEN: Soft, Nontender, Nondistended  EXTREMITIES:  2+ Peripheral Pulses, No LE edema  NEUROLOGY: non-focal  SKIN: No rashes or lesions                          11.5   5.31  )-----------( 228      ( 11 Dec 2023 07:28 )             34.3       12-11    136  |  101  |  23  ----------------------------<  243<H>  3.2<L>   |  24  |  1.06    Ca    8.7      11 Dec 2023 07:30  Mg     2.1     12-10    TPro  5.9<L>  /  Alb  2.9<L>  /  TBili  4.8<H>  /  DBili  3.6<H>  /  AST  978<H>  /  ALT  681<H>  /  AlkPhos  1037<H>  12-11      Magnesium: 2.1 mg/dL (12-10 @ 16:06)          .Blood Blood-Venous  12-09 @ 22:02   No growth at 24 hours  --  --      .Blood Blood-Peripheral  12-09 @ 21:52   No growth at 24 hours  --  --      Clean Catch Clean Catch (Midstream)  12-09 @ 16:10   >100,000 CFU/ml Enterococcus species  --  --       HPI:  Pt is an 82yo F w/ PMH of HTN, HLD, DM2, CAD c/b MI, Afib on Eliquis, PAD, metastatic lung CA w/ known brain mets s/p craniectomy previously on Keytruda pw elevated liver enzymes noted on outpt labs.     Only symptoms she endorses are feeling weak w/ decreased appetite x1 yr since craniectomy. Went to endo visit about 2-3 weeks ago and was noted to have increased liver enzymes which continued to worsen over that time period, leading ot her presentation to [facility].     Here she denies any HA, dizziness, CP, SOB, abd pain, N/V. Does endorse pruritis that's unchanged. In the ED VSS w/ labs as below w/ elevated liver enzymes. (09 Dec 2023 21:48)      PAST MEDICAL & SURGICAL HISTORY:  Hypertension    Coronary artery disease    Hyperlipidemia    Peripheral artery disease    Myocardial infarct    Peripheral neuropathy    Diabetes    Chronic back pain    Former smoker    Non-small cell lung cancer    PAD (peripheral artery disease)    CAD (coronary artery disease)  stents      Allergies    No Known Drug Allergies  adhesives (Pruritus)    Intolerances      MEDICATIONS  (STANDING):  apixaban 2.5 milliGRAM(s) Oral every 12 hours  artificial tears (preservative free) Ophthalmic Solution 1 Drop(s) Both EYES three times a day  cholecalciferol 1000 Unit(s) Oral daily  dextrose 5%. 1000 milliLiter(s) (50 mL/Hr) IV Continuous <Continuous>  dextrose 5%. 1000 milliLiter(s) (100 mL/Hr) IV Continuous <Continuous>  dextrose 50% Injectable 12.5 Gram(s) IV Push once  dextrose 50% Injectable 25 Gram(s) IV Push once  dextrose 50% Injectable 25 Gram(s) IV Push once  gabapentin 300 milliGRAM(s) Oral at bedtime  gabapentin 100 milliGRAM(s) Oral daily  glucagon  Injectable 1 milliGRAM(s) IntraMuscular once  insulin lispro (ADMELOG) corrective regimen sliding scale   SubCutaneous three times a day before meals  insulin lispro (ADMELOG) corrective regimen sliding scale   SubCutaneous at bedtime  levETIRAcetam 1000 milliGRAM(s) Oral two times a day  metoprolol tartrate 50 milliGRAM(s) Oral two times a day  potassium chloride    Tablet ER 20 milliEquivalent(s) Oral every 2 hours  traMADol 50 milliGRAM(s) Oral three times a day    MEDICATIONS  (PRN):  dextrose Oral Gel 15 Gram(s) Oral once PRN Blood Glucose LESS THAN 70 milliGRAM(s)/deciliter      FAMILY HISTORY:  Family history of stroke (Sibling)      SOCIAL HISTORY: No EtOH, former tobacco use    REVIEW OF SYSTEMS:    CONSTITUTIONAL: No weakness, fevers or chills  EYES/ENT: No visual changes;  No vertigo or throat pain   NECK: No pain or stiffness  RESPIRATORY: No cough, wheezing, hemoptysis; No shortness of breath  CARDIOVASCULAR: No chest pain or palpitations  GASTROINTESTINAL: +GERD  GENITOURINARY: No dysuria, frequency or hematuria  NEUROLOGICAL: No numbness or weakness  SKIN: No itching, burning, rashes, or lesions   All other review of systems is negative unless indicated above.      T(F): 97.8 (12-11-23 @ 12:57), Max: 98.5 (12-10-23 @ 17:33)  HR: 68 (12-11-23 @ 12:57)  BP: 104/64 (12-11-23 @ 12:57)  RR: 18 (12-11-23 @ 12:57)  SpO2: 93% (12-11-23 @ 12:57)  Wt(kg): --    GENERAL: NAD, sitting in chair  HEAD:  Atraumatic, Normocephalic  EYES: EOMI, conjunctiva and sclera clear  NECK: Supple  CHEST/LUNG: Clear to auscultation bilaterally; No wheeze  HEART: Regular rate and rhythm; No murmurs  ABDOMEN: Soft, Nontender, Nondistended  EXTREMITIES:  2+ Peripheral Pulses, No LE edema  NEUROLOGY: non-focal  SKIN: No rashes or lesions                          11.5   5.31  )-----------( 228      ( 11 Dec 2023 07:28 )             34.3       12-11    136  |  101  |  23  ----------------------------<  243<H>  3.2<L>   |  24  |  1.06    Ca    8.7      11 Dec 2023 07:30  Mg     2.1     12-10    TPro  5.9<L>  /  Alb  2.9<L>  /  TBili  4.8<H>  /  DBili  3.6<H>  /  AST  978<H>  /  ALT  681<H>  /  AlkPhos  1037<H>  12-11      Magnesium: 2.1 mg/dL (12-10 @ 16:06)          .Blood Blood-Venous  12-09 @ 22:02   No growth at 24 hours  --  --      .Blood Blood-Peripheral  12-09 @ 21:52   No growth at 24 hours  --  --      Clean Catch Clean Catch (Midstream)  12-09 @ 16:10   >100,000 CFU/ml Enterococcus species  --  --

## 2023-12-11 NOTE — PROGRESS NOTE ADULT - ASSESSMENT
ECHO 10/3/22: EF 40-45%. Akinesis of the inferior with hypokinesis of the inferolateral, septum and anterolateral hypokinetic.    A/p  83y  woman with a PMHx significant for HTN, DM, HLD, CAD, MI, PAD, peripheral neuropathy, former smoker, NSCLC with ANTHONY primary tumor and brain metastasis s/p right craniotomy & resection on 7/27/22 and post-op RT on active treatment, STEMI (10/2022), afib on eliquis, hx of RML PE presenting with elevated lfts     #elevated lfts   -work up per hepatology  -? med induced   -hepatology eval noted  -CT abd/pelv noted   -F/u MR abdomen    #Paroxysmal atrial fibrillation  -cont apixaban   -cont metoprolol    #hypertension  -stable  -cont bb    #Hx NSTEMI, CAD, s/p PCI  -STEMI in 8/2022 treated medically at the time in setting of recent craniotomy  -No chest pain or sob  -no statins given lfts  -can check echo     #Metast. Lung cancer to brain.   -s/p right craniotomy for resection of brain mass 7/27/22  -Heme/onc eval     #history of PE - dx in AUG 2022   -cont ac

## 2023-12-11 NOTE — CONSULT NOTE ADULT - ASSESSMENT
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 Oncology Consulted for continuity of care.    #NSCLC  - Pt follows Dr. Sudhakar Ramon at [facility]. She was initially diagnosed with Stage IVB metastatic lung cancer (PDL1 negative, TMB - high) s/p craniotomy on 7/2022 and SRS to the temporal cavity and two metastatic lesions currently on single agent Pembrolizumab since October 2022.  - Pembrolizumab held in the setting of elevated liver enzymes    #Elevated liver enzymes  - Possibly DILI secondary to Pembrolizumab  - liver enzymes downtrending  - Appreciate hepatology recs    Recommend:  - Follow up MR abdomen   - Follow up hepatology labs, trend liver enzymes daily  - pending above workup may require liver biopsy with IR to rule out DILI secondary to Pembrolizumab  - No plan for inpatient immunotherapy    Case discussed w/ Dr. 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 Oncology Consulted for continuity of care.    #NSCLC  - Pt follows Dr. Sudhakar Ramon at [facility]. She was initially diagnosed with Stage IVB metastatic lung cancer (PDL1 negative, TMB - high) s/p craniotomy on 7/2022 and SRS to the temporal cavity and two metastatic lesions currently on single agent Pembrolizumab since October 2022.  - Pembrolizumab held in the setting of elevated liver enzymes    #Elevated liver enzymes  - Possibly DILI secondary to Pembrolizumab  - liver enzymes downtrending  - Appreciate hepatology recs    Recommend:  - Follow up MR abdomen   - Follow up hepatology labs, trend liver enzymes daily  - pending above workup may require liver biopsy with IR to rule out DILI secondary to Pembrolizumab  - No plan for inpatient immunotherapy    Case discussed w/ Dr. Scott Lee, PGY-4  Fellow Hematology/Oncology  pager [phone]  Available on TEAMS  After 5pm or on weekends please contact  to page on-call fellow

## 2023-12-11 NOTE — PROGRESS NOTE ADULT - SUBJECTIVE AND OBJECTIVE BOX
DATE OF SERVICE: 12-11-23 @ 13:02    Patient is a 83y old  Female who presents with a chief complaint of Elevated liver enzymes (11 Dec 2023 10:04)      SUBJECTIVE / OVERNIGHT EVENTS:  No chest pain. No shortness of breath. No complaints. No events overnight.     MEDICATIONS  (STANDING):  apixaban 2.5 milliGRAM(s) Oral every 12 hours  artificial tears (preservative free) Ophthalmic Solution 1 Drop(s) Both EYES three times a day  cholecalciferol 1000 Unit(s) Oral daily  dextrose 5%. 1000 milliLiter(s) (50 mL/Hr) IV Continuous <Continuous>  dextrose 5%. 1000 milliLiter(s) (100 mL/Hr) IV Continuous <Continuous>  dextrose 50% Injectable 12.5 Gram(s) IV Push once  dextrose 50% Injectable 25 Gram(s) IV Push once  dextrose 50% Injectable 25 Gram(s) IV Push once  gabapentin 300 milliGRAM(s) Oral at bedtime  gabapentin 100 milliGRAM(s) Oral daily  glucagon  Injectable 1 milliGRAM(s) IntraMuscular once  insulin lispro (ADMELOG) corrective regimen sliding scale   SubCutaneous three times a day before meals  insulin lispro (ADMELOG) corrective regimen sliding scale   SubCutaneous at bedtime  levETIRAcetam 1000 milliGRAM(s) Oral two times a day  metoprolol tartrate 50 milliGRAM(s) Oral two times a day  potassium chloride  10 mEq/50 mL IVPB 10 milliEquivalent(s) IV Intermittent every 1 hour  traMADol 50 milliGRAM(s) Oral three times a day    MEDICATIONS  (PRN):  dextrose Oral Gel 15 Gram(s) Oral once PRN Blood Glucose LESS THAN 70 milliGRAM(s)/deciliter      Vital Signs Last 24 Hrs  T(C): 36.8 (11 Dec 2023 08:48), Max: 36.9 (10 Dec 2023 17:33)  T(F): 98.2 (11 Dec 2023 08:48), Max: 98.5 (10 Dec 2023 17:33)  HR: 63 (11 Dec 2023 08:48) (60 - 76)  BP: 125/73 (11 Dec 2023 08:48) (110/59 - 125/73)  BP(mean): --  RR: 18 (11 Dec 2023 08:48) (17 - 18)  SpO2: 99% (11 Dec 2023 08:48) (96% - 100%)    Parameters below as of 11 Dec 2023 08:48  Patient On (Oxygen Delivery Method): room air      CAPILLARY BLOOD GLUCOSE      POCT Blood Glucose.: 250 mg/dL (11 Dec 2023 08:49)  POCT Blood Glucose.: 253 mg/dL (10 Dec 2023 21:28)  POCT Blood Glucose.: 149 mg/dL (10 Dec 2023 17:30)    I&O's Summary    10 Dec 2023 07:01  -  11 Dec 2023 07:00  --------------------------------------------------------  IN: 530 mL / OUT: 2100 mL / NET: -1570 mL    11 Dec 2023 07:01  -  11 Dec 2023 13:02  --------------------------------------------------------  IN: 140 mL / OUT: 0 mL / NET: 140 mL        PHYSICAL EXAM:  GENERAL: NAD, well-developed  HEAD:  Atraumatic, Normocephalic  EYES: EOMI, PERRLA, conjunctiva and sclera clear  NECK: Supple, No JVD  CHEST/LUNG: Clear to auscultation bilaterally; No wheeze  HEART: Regular rate and rhythm; No murmurs, rubs, or gallops  ABDOMEN: Soft, Nontender, Nondistended; Bowel sounds present  EXTREMITIES:  2+ Peripheral Pulses, No clubbing, cyanosis, or edema  PSYCH: AAOx3  NEUROLOGY: non-focal  SKIN: No rashes or lesions    LABS:                        11.5   5.31  )-----------( 228      ( 11 Dec 2023 07:28 )             34.3     12-11    136  |  101  |  23  ----------------------------<  243<H>  3.2<L>   |  24  |  1.06    Ca    8.7      11 Dec 2023 07:30  Mg     2.1     12-10    TPro  5.9<L>  /  Alb  2.9<L>  /  TBili  4.8<H>  /  DBili  3.6<H>  /  AST  978<H>  /  ALT  681<H>  /  AlkPhos  1037<H>  12-11          Urinalysis Basic - ( 11 Dec 2023 07:30 )    Color: x / Appearance: x / SG: x / pH: x  Gluc: 243 mg/dL / Ketone: x  / Bili: x / Urobili: x   Blood: x / Protein: x / Nitrite: x   Leuk Esterase: x / RBC: x / WBC x   Sq Epi: x / Non Sq Epi: x / Bacteria: x    < from: MR Head w/wo IV Cont (11.02.23 @ 14:18) >  FINDINGS:    Stable postsurgical changes from right temporoparietal craniotomy with   postsurgical encephalomalacia and gliosis in the right lateral temporal   lobe. Stable 5 mm focus of enhancement along the mesial aspect of the   surgical cavity. There is no new parenchymal or meningeal enhancement.    Stable nonenhancing cystic lesion along the medial left frontal lobe   measuring approximately 7 mm.    No hydrocephalus, midline shift, or extra-axial fluid collections. No   acute infarcts or other diffusion abnormalities. No acute intracranial   hemorrhage.    The visualized paranasal sinuses and mastoid air cells are clear.    The orbits, sellar and suprasellar structures, and craniocervical   junction are unremarkable.    IMPRESSION:    Stable exam compared to 7/17/2023.      < end of copied text >  < from: TTE W or WO Ultrasound Enhancing Agent (12.11.23 @ 10:05) >  CONCLUSIONS:      1. Technically difficult image quality.   2. Left ventricular cavity is normal. Left ventricular wall thickness is normal. Left ventricular systolic function is normal with an ejection fraction of 53 % by Eaton's method of disks. Regional wall motion abnormalities present.   3. Normal right ventricular cavity size, wall thickness, and systolic function.   4. There is mild mitral regurgitation.   5. Pulmonary artery systolic pressure could not be estimated.   6. No pericardial effusion seen.   7. No prior echocardiogram is available for comparison.      < end of copied text >      RADIOLOGY & ADDITIONAL TESTS:    Imaging Personally Reviewed:    Consultant(s) Notes Reviewed:      Care Discussed with Consultants/Other Providers:

## 2023-12-11 NOTE — PROGRESS NOTE ADULT - SUBJECTIVE AND OBJECTIVE BOX
Chief Complaint:  Patient is a 83y old  Female who presents with a chief complaint of Elevated liver enzymes (11 Dec 2023 09:25)      Interval Events:   no events overnight; vital signs stable    Allergies:  No Known Drug Allergies  adhesives (Pruritus)        Hospital Medications:  apixaban 2.5 milliGRAM(s) Oral every 12 hours  artificial tears (preservative free) Ophthalmic Solution 1 Drop(s) Both EYES three times a day  cholecalciferol 1000 Unit(s) Oral daily  dextrose 5%. 1000 milliLiter(s) IV Continuous <Continuous>  dextrose 5%. 1000 milliLiter(s) IV Continuous <Continuous>  dextrose 50% Injectable 12.5 Gram(s) IV Push once  dextrose 50% Injectable 25 Gram(s) IV Push once  dextrose 50% Injectable 25 Gram(s) IV Push once  dextrose Oral Gel 15 Gram(s) Oral once PRN  gabapentin 300 milliGRAM(s) Oral at bedtime  gabapentin 100 milliGRAM(s) Oral daily  glucagon  Injectable 1 milliGRAM(s) IntraMuscular once  insulin lispro (ADMELOG) corrective regimen sliding scale   SubCutaneous three times a day before meals  insulin lispro (ADMELOG) corrective regimen sliding scale   SubCutaneous at bedtime  levETIRAcetam 1000 milliGRAM(s) Oral two times a day  metoprolol tartrate 50 milliGRAM(s) Oral two times a day  potassium chloride  20 mEq/100 mL IVPB 20 milliEquivalent(s) IV Intermittent every 2 hours  traMADol 50 milliGRAM(s) Oral three times a day      PMHX/PSHX:  Hypertension    Coronary artery disease    Hyperlipidemia    Peripheral artery disease    Myocardial infarct    Peripheral neuropathy    Diabetes    Chronic back pain    Former smoker    Non-small cell lung cancer    PAD (peripheral artery disease)    CAD (coronary artery disease)        Family history:  No pertinent family history in first degree relatives    Family history of stroke (Sibling)        PHYSICAL EXAM:   Vital Signs:  Vital Signs Last 24 Hrs  T(C): 36.8 (11 Dec 2023 08:48), Max: 36.9 (10 Dec 2023 17:33)  T(F): 98.2 (11 Dec 2023 08:48), Max: 98.5 (10 Dec 2023 17:33)  HR: 63 (11 Dec 2023 08:48) (60 - 76)  BP: 125/73 (11 Dec 2023 08:48) (110/59 - 125/73)  BP(mean): --  RR: 18 (11 Dec 2023 08:48) (17 - 18)  SpO2: 99% (11 Dec 2023 08:48) (96% - 100%)    Parameters below as of 11 Dec 2023 08:48  Patient On (Oxygen Delivery Method): room air      Daily     Daily     GENERAL:  No acute distress  HEENT:  no scleral icterus  CHEST:  no accessory muscle use  HEART:  Regular rate and rhythm  ABDOMEN:  Soft, non-tender, non-distended  EXTREMITIES:  No edema  SKIN:  No rash/ecchymoses  NEURO:  Alert and oriented x 3    LABS:                        11.5   5.31  )-----------( 228      ( 11 Dec 2023 07:28 )             34.3     Mean Cell Volume: 90.7 fl (12-11-23 @ 07:28)    12-11    136  |  101  |  23  ----------------------------<  243<H>  3.2<L>   |  24  |  1.06    Ca    8.7      11 Dec 2023 07:30  Mg     2.1     12-10    TPro  5.8<L>  /  Alb  3.3  /  TBili  5.0<H>  /  DBili  x   /  AST  1080<H>  /  ALT  763<H>  /  AlkPhos  1077<H>  12-10    LIVER FUNCTIONS - ( 10 Dec 2023 07:32 )  Alb: 3.3 g/dL / Pro: 5.8 g/dL / ALK PHOS: 1077 U/L / ALT: 763 U/L / AST: 1080 U/L / GGT: x             Urinalysis Basic - ( 11 Dec 2023 07:30 )    Color: x / Appearance: x / SG: x / pH: x  Gluc: 243 mg/dL / Ketone: x  / Bili: x / Urobili: x   Blood: x / Protein: x / Nitrite: x   Leuk Esterase: x / RBC: x / WBC x   Sq Epi: x / Non Sq Epi: x / Bacteria: x                              11.5   5.31  )-----------( 228      ( 11 Dec 2023 07:28 )             34.3                         11.0   4.83  )-----------( 226      ( 10 Dec 2023 07:32 )             32.6                         12.7   5.44  )-----------( 266      ( 09 Dec 2023 08:27 )             38.3       Imaging:

## 2023-12-11 NOTE — PROGRESS NOTE ADULT - ATTENDING COMMENTS
Elevated LFTs, improved today. Serologic workup unrevealing thus far.  Agree with above - will discuss with oncology about liver biopsy - benefit depends on existence of alternatives to pembrolizumab and overall prognosis.  - send HSV Ab, f/u HBV PCR, HEV PCR, MRI/MRCP

## 2023-12-11 NOTE — PROGRESS NOTE ADULT - ASSESSMENT
84yo F w/ PMH of HTN, HLD, DM2, CAD c/b MI, Afib on Eliquis, PAD, metastatic lung CA w/ known brain mets s/p craniectomy previously on Keytruda pw elevated liver enzymes noted on outpt labs       Problem/Plan - 1:  ·  Problem: Hepatocellular injury.   ·  Plan: - ddx includes DILI 2/2 Keytruda vs less likely Keppra as has been on for a year vs AI vs less likely ischemic   - No longer on Keytruda  - US unremarkable   - Order AI w/u  - MR Abdomen w/ IV   - acute viral hepatitis panel, plus HBV PCR, HCV PCR, HEV IgM/IgG  - ASMA, AMA, LKM  - trend LFTs Q8H  - full infectious workup  - UTox   follow up with oncology re: alternatives to Keytruda for further treatment; if no alternatives, can consider liver biopsy to confirm diagnosis; however, if alternatives available, then would defer bx as liver numbers are improving  - MRI Abdomen WIC pending  - HBV PCR, HCV PCR, HEV IgM/IgG  - ASMA, AMA, LKM  - trend LFTs  -  follow up with oncology re: alternatives to Keytruda for further treatment; if no alternatives, can consider liver biopsy to confirm diagnosis; however, if alternatives available, then would defer bx as liver numbers are improving  - MRI Abdomen WIC pending  - HBV PCR, HCV PCR, HEV IgM/IgG  - ASMA, AMA, LKM  - trend LFTs  - BCx NGTD     Problem/Plan - 2:  ·  Problem: Chronic atrial fibrillation.   ·  Plan: - On Eliquis 2.5mg BID, continue   - c/w Lopressor 50mg BID.     Problem/Plan - 3:  ·  Problem: Type 2 diabetes mellitus.   ·  Plan: - f/u AM A1c  - SALAS for now  - CC diet  - Diabetic education.     Problem/Plan - 4:  ·  Problem: HTN (hypertension).   ·  Plan: - c/w home medications w/ hold parameters.     Problem/Plan - 5:  ·  Problem: HLD (hyperlipidemia).   ·  Plan: - No statin given elevated liver enzymes.     Problem/Plan - 6:  ·  Problem: CAD (coronary artery disease).   ·  Plan: - On Eliquis.     Problem/Plan - 7:  ·  Problem: Primary lung cancer with metastasis from lung to other site.   ·  Plan: - Holding Keytruda  - c/w Keppra for brain mets.     Problem/Plan - 8:  ·  Problem: Prophylactic measure.   ·  Plan: DVT ppx: On Eliquis  Diet: DASH/CC  Dispo: pending clinical improvement.

## 2023-12-11 NOTE — PROGRESS NOTE ADULT - SUBJECTIVE AND OBJECTIVE BOX
CARDIOLOGY FOLLOW UP - Dr. Alcaraz  DATE OF SERVICE: 12/11/23    CC  No CV complaints     REVIEW OF SYSTEMS:  CONSTITUTIONAL: No fever, weight loss, or fatigue  RESPIRATORY: No cough, wheezing, chills or hemoptysis; No Shortness of Breath  CARDIOVASCULAR: No chest pain, palpitations, passing out, dizziness, or leg swelling  GASTROINTESTINAL: No abdominal or epigastric pain. No nausea, vomiting, or hematemesis; No diarrhea or constipation. No melena or hematochezia.  VASCULAR: No edema     PHYSICAL EXAM:  T(C): 36.8 (12-11-23 @ 08:48), Max: 36.9 (12-10-23 @ 17:33)  HR: 63 (12-11-23 @ 08:48) (60 - 76)  BP: 125/73 (12-11-23 @ 08:48) (101/59 - 125/73)  RR: 18 (12-11-23 @ 08:48) (17 - 18)  SpO2: 99% (12-11-23 @ 08:48) (96% - 100%)  Wt(kg): --  I&O's Summary    10 Dec 2023 07:01  -  11 Dec 2023 07:00  --------------------------------------------------------  IN: 530 mL / OUT: 2100 mL / NET: -1570 mL    11 Dec 2023 07:01  -  11 Dec 2023 09:25  --------------------------------------------------------  IN: 0 mL / OUT: 0 mL / NET: 0 mL        Appearance: Normal	  Cardiovascular: Normal S1 S2,RRR, No JVD, No murmurs  Respiratory: Lungs clear to auscultation b/l   Gastrointestinal:  Soft, Non-tender, + BS	  Extremities: Normal range of motion, No clubbing, cyanosis or edema      Home Medications:  gabapentin 100 mg oral capsule: 1 cap(s) orally once a day (in the morning) (09 Dec 2023 15:06)  gabapentin 300 mg oral capsule: 1 cap(s) orally once a day (in the evening) (09 Dec 2023 15:06)  metFORMIN 1000 mg oral tablet: 1 tab(s) orally 2 times a day (09 Dec 2023 15:03)  metoprolol tartrate 50 mg oral tablet: 1 tab(s) orally 2 times a day (09 Dec 2023 15:03)  Ozempic 2 mg/1.5 mL (0.25 mg or 0.5 mg dose) subcutaneous solution: 0.25 milligram(s) subcutaneously once a week on tuesday (09 Dec 2023 15:04)  traMADol 50 mg oral tablet: 1 tab(s) orally 3 times a day (09 Dec 2023 15:04)  Tresiba FlexTouch 100 units/mL subcutaneous solution: 14 unit(s) subcutaneous once a day (09 Dec 2023 15:04)  Vitamin B-12: 1 tab once a day (09 Dec 2023 15:04)  Vitamin D3 25 mcg (1000 intl units) oral tablet: 1 tab(s) orally once a day (09 Dec 2023 15:04)      MEDICATIONS  (STANDING):  apixaban 2.5 milliGRAM(s) Oral every 12 hours  artificial tears (preservative free) Ophthalmic Solution 1 Drop(s) Both EYES three times a day  cholecalciferol 1000 Unit(s) Oral daily  dextrose 5%. 1000 milliLiter(s) (50 mL/Hr) IV Continuous <Continuous>  dextrose 5%. 1000 milliLiter(s) (100 mL/Hr) IV Continuous <Continuous>  dextrose 50% Injectable 12.5 Gram(s) IV Push once  dextrose 50% Injectable 25 Gram(s) IV Push once  dextrose 50% Injectable 25 Gram(s) IV Push once  gabapentin 300 milliGRAM(s) Oral at bedtime  gabapentin 100 milliGRAM(s) Oral daily  glucagon  Injectable 1 milliGRAM(s) IntraMuscular once  insulin lispro (ADMELOG) corrective regimen sliding scale   SubCutaneous three times a day before meals  insulin lispro (ADMELOG) corrective regimen sliding scale   SubCutaneous at bedtime  levETIRAcetam 1000 milliGRAM(s) Oral two times a day  metoprolol tartrate 50 milliGRAM(s) Oral two times a day  potassium chloride  20 mEq/100 mL IVPB 20 milliEquivalent(s) IV Intermittent every 2 hours  traMADol 50 milliGRAM(s) Oral three times a day      TELEMETRY: 	    ECG:  	  RADIOLOGY:   DIAGNOSTIC TESTING:  [ ] Echocardiogram:  [ ]  Catheterization:  [ ] Stress Test:    OTHER: 	    LABS:	 	                            11.5   5.31  )-----------( 228      ( 11 Dec 2023 07:28 )             34.3     12-11    136  |  101  |  23  ----------------------------<  243<H>  3.2<L>   |  24  |  1.06    Ca    8.7      11 Dec 2023 07:30  Mg     2.1     12-10    TPro  5.8<L>  /  Alb  3.3  /  TBili  5.0<H>  /  DBili  x   /  AST  1080<H>  /  ALT  763<H>  /  AlkPhos  1077<H>  12-10

## 2023-12-11 NOTE — CONSULT NOTE ADULT - ATTENDING COMMENTS
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 Pembrolizumab held in the setting of elevated liver enzymes. Appreciate hepatology input re transaminitis. MRCP pending. May need liver bx to establish dx

## 2023-12-12 LAB
ALBUMIN SERPL ELPH-MCNC: 3.1 G/DL — LOW (ref 3.3–5)
ALBUMIN SERPL ELPH-MCNC: 3.1 G/DL — LOW (ref 3.3–5)
ALBUMIN SERPL ELPH-MCNC: 3.9 G/DL
ALP BLD-CCNC: 1369 U/L
ALP SERPL-CCNC: 933 U/L — HIGH (ref 40–120)
ALP SERPL-CCNC: 933 U/L — HIGH (ref 40–120)
ALT FLD-CCNC: 645 U/L — HIGH (ref 10–45)
ALT FLD-CCNC: 645 U/L — HIGH (ref 10–45)
ALT SERPL-CCNC: 1013 U/L
ANA SER IF-ACNC: NEGATIVE
ANION GAP SERPL CALC-SCNC: 11 MMOL/L — SIGNIFICANT CHANGE UP (ref 5–17)
ANION GAP SERPL CALC-SCNC: 11 MMOL/L — SIGNIFICANT CHANGE UP (ref 5–17)
ANION GAP SERPL CALC-SCNC: 18 MMOL/L
AST SERPL-CCNC: 1463 U/L
AST SERPL-CCNC: 863 U/L — HIGH (ref 10–40)
AST SERPL-CCNC: 863 U/L — HIGH (ref 10–40)
BILIRUB DIRECT SERPL-MCNC: 3.3 MG/DL — HIGH (ref 0–0.3)
BILIRUB DIRECT SERPL-MCNC: 3.3 MG/DL — HIGH (ref 0–0.3)
BILIRUB INDIRECT FLD-MCNC: 0.9 MG/DL — SIGNIFICANT CHANGE UP (ref 0.2–1)
BILIRUB INDIRECT FLD-MCNC: 0.9 MG/DL — SIGNIFICANT CHANGE UP (ref 0.2–1)
BILIRUB SERPL-MCNC: 4.2 MG/DL — HIGH (ref 0.2–1.2)
BILIRUB SERPL-MCNC: 4.2 MG/DL — HIGH (ref 0.2–1.2)
BILIRUB SERPL-MCNC: 5.2 MG/DL
BUN SERPL-MCNC: 25 MG/DL — HIGH (ref 7–23)
BUN SERPL-MCNC: 25 MG/DL — HIGH (ref 7–23)
BUN SERPL-MCNC: 26 MG/DL
CALCIUM SERPL-MCNC: 8.8 MG/DL — SIGNIFICANT CHANGE UP (ref 8.4–10.5)
CALCIUM SERPL-MCNC: 8.8 MG/DL — SIGNIFICANT CHANGE UP (ref 8.4–10.5)
CALCIUM SERPL-MCNC: 9.5 MG/DL
CHLORIDE SERPL-SCNC: 100 MMOL/L
CHLORIDE SERPL-SCNC: 102 MMOL/L — SIGNIFICANT CHANGE UP (ref 96–108)
CHLORIDE SERPL-SCNC: 102 MMOL/L — SIGNIFICANT CHANGE UP (ref 96–108)
CMV DNA SPEC QL NAA+PROBE: NOT DETECTED IU/ML
CMVPCR LOG: NOT DETECTED LOG10IU/ML
CO2 SERPL-SCNC: 22 MMOL/L
CO2 SERPL-SCNC: 24 MMOL/L — SIGNIFICANT CHANGE UP (ref 22–31)
CO2 SERPL-SCNC: 24 MMOL/L — SIGNIFICANT CHANGE UP (ref 22–31)
CREAT SERPL-MCNC: 1.07 MG/DL — SIGNIFICANT CHANGE UP (ref 0.5–1.3)
CREAT SERPL-MCNC: 1.07 MG/DL — SIGNIFICANT CHANGE UP (ref 0.5–1.3)
CREAT SERPL-MCNC: 1.21 MG/DL
EBV DNA SERPL NAA+PROBE-ACNC: NOT DETECTED IU/ML
EBVPCR LOG: NOT DETECTED LOG10IU/ML
EGFR: 44 ML/MIN/1.73M2
EGFR: 52 ML/MIN/1.73M2 — LOW
EGFR: 52 ML/MIN/1.73M2 — LOW
FERRITIN SERPL-MCNC: 679 NG/ML
GLUCOSE BLDC GLUCOMTR-MCNC: 231 MG/DL — HIGH (ref 70–99)
GLUCOSE BLDC GLUCOMTR-MCNC: 231 MG/DL — HIGH (ref 70–99)
GLUCOSE BLDC GLUCOMTR-MCNC: 239 MG/DL — HIGH (ref 70–99)
GLUCOSE BLDC GLUCOMTR-MCNC: 239 MG/DL — HIGH (ref 70–99)
GLUCOSE BLDC GLUCOMTR-MCNC: 262 MG/DL — HIGH (ref 70–99)
GLUCOSE SERPL-MCNC: 128 MG/DL
GLUCOSE SERPL-MCNC: 251 MG/DL — HIGH (ref 70–99)
GLUCOSE SERPL-MCNC: 251 MG/DL — HIGH (ref 70–99)
HCT VFR BLD CALC: 33.4 % — LOW (ref 34.5–45)
HCT VFR BLD CALC: 33.4 % — LOW (ref 34.5–45)
HEPATITIS A IGG ANTIBODY: NONREACTIVE
HEV AB FLD QL: NEGATIVE — SIGNIFICANT CHANGE UP
HEV AB FLD QL: NEGATIVE — SIGNIFICANT CHANGE UP
HEV IGM SER QL: NEGATIVE — SIGNIFICANT CHANGE UP
HEV IGM SER QL: NEGATIVE — SIGNIFICANT CHANGE UP
HGB BLD-MCNC: 11.1 G/DL — LOW (ref 11.5–15.5)
HGB BLD-MCNC: 11.1 G/DL — LOW (ref 11.5–15.5)
IGG SER QL IEP: 792 MG/DL
INR PPP: 1.47 RATIO
LKM AB SER-ACNC: <20.1 UNITS — SIGNIFICANT CHANGE UP (ref 0–20)
LKM AB SER-ACNC: <20.1 UNITS — SIGNIFICANT CHANGE UP (ref 0–20)
MCHC RBC-ENTMCNC: 30.1 PG — SIGNIFICANT CHANGE UP (ref 27–34)
MCHC RBC-ENTMCNC: 30.1 PG — SIGNIFICANT CHANGE UP (ref 27–34)
MCHC RBC-ENTMCNC: 33.2 GM/DL — SIGNIFICANT CHANGE UP (ref 32–36)
MCHC RBC-ENTMCNC: 33.2 GM/DL — SIGNIFICANT CHANGE UP (ref 32–36)
MCV RBC AUTO: 90.5 FL — SIGNIFICANT CHANGE UP (ref 80–100)
MCV RBC AUTO: 90.5 FL — SIGNIFICANT CHANGE UP (ref 80–100)
MITOCHONDRIA AB SER-ACNC: SIGNIFICANT CHANGE UP
MITOCHONDRIA AB SER-ACNC: SIGNIFICANT CHANGE UP
MITOCHONDRIAL (M2) AB, SERUM: <20 UNITS
NRBC # BLD: 0 /100 WBCS — SIGNIFICANT CHANGE UP (ref 0–0)
NRBC # BLD: 0 /100 WBCS — SIGNIFICANT CHANGE UP (ref 0–0)
PLATELET # BLD AUTO: 242 K/UL — SIGNIFICANT CHANGE UP (ref 150–400)
PLATELET # BLD AUTO: 242 K/UL — SIGNIFICANT CHANGE UP (ref 150–400)
POTASSIUM SERPL-MCNC: 3.6 MMOL/L — SIGNIFICANT CHANGE UP (ref 3.5–5.3)
POTASSIUM SERPL-MCNC: 3.6 MMOL/L — SIGNIFICANT CHANGE UP (ref 3.5–5.3)
POTASSIUM SERPL-SCNC: 3.6 MMOL/L — SIGNIFICANT CHANGE UP (ref 3.5–5.3)
POTASSIUM SERPL-SCNC: 3.6 MMOL/L — SIGNIFICANT CHANGE UP (ref 3.5–5.3)
POTASSIUM SERPL-SCNC: 3.9 MMOL/L
PROT SERPL-MCNC: 5.8 G/DL — LOW (ref 6–8.3)
PROT SERPL-MCNC: 5.8 G/DL — LOW (ref 6–8.3)
PROT SERPL-MCNC: 6.5 G/DL
PT BLD: 16.4 SEC
RBC # BLD: 3.69 M/UL — LOW (ref 3.8–5.2)
RBC # BLD: 3.69 M/UL — LOW (ref 3.8–5.2)
RBC # FLD: 15.2 % — HIGH (ref 10.3–14.5)
RBC # FLD: 15.2 % — HIGH (ref 10.3–14.5)
SMOOTH MUSCLE AB SER QL IF: ABNORMAL
SMOOTH MUSCLE AB SER-ACNC: ABNORMAL
SMOOTH MUSCLE AB SER-ACNC: ABNORMAL
SODIUM SERPL-SCNC: 137 MMOL/L — SIGNIFICANT CHANGE UP (ref 135–145)
SODIUM SERPL-SCNC: 137 MMOL/L — SIGNIFICANT CHANGE UP (ref 135–145)
SODIUM SERPL-SCNC: 140 MMOL/L
WBC # BLD: 5.43 K/UL — SIGNIFICANT CHANGE UP (ref 3.8–10.5)
WBC # BLD: 5.43 K/UL — SIGNIFICANT CHANGE UP (ref 3.8–10.5)
WBC # FLD AUTO: 5.43 K/UL — SIGNIFICANT CHANGE UP (ref 3.8–10.5)
WBC # FLD AUTO: 5.43 K/UL — SIGNIFICANT CHANGE UP (ref 3.8–10.5)

## 2023-12-12 PROCEDURE — 99232 SBSQ HOSP IP/OBS MODERATE 35: CPT | Mod: GC

## 2023-12-12 RX ADMIN — TRAMADOL HYDROCHLORIDE 50 MILLIGRAM(S): 50 TABLET ORAL at 05:53

## 2023-12-12 RX ADMIN — Medication 3: at 13:36

## 2023-12-12 RX ADMIN — TRAMADOL HYDROCHLORIDE 50 MILLIGRAM(S): 50 TABLET ORAL at 14:07

## 2023-12-12 RX ADMIN — APIXABAN 2.5 MILLIGRAM(S): 2.5 TABLET, FILM COATED ORAL at 05:53

## 2023-12-12 RX ADMIN — GABAPENTIN 100 MILLIGRAM(S): 400 CAPSULE ORAL at 12:25

## 2023-12-12 RX ADMIN — LEVETIRACETAM 1000 MILLIGRAM(S): 250 TABLET, FILM COATED ORAL at 05:54

## 2023-12-12 RX ADMIN — Medication 1 DROP(S): at 17:24

## 2023-12-12 RX ADMIN — TRAMADOL HYDROCHLORIDE 50 MILLIGRAM(S): 50 TABLET ORAL at 21:45

## 2023-12-12 RX ADMIN — Medication 1000 UNIT(S): at 12:25

## 2023-12-12 RX ADMIN — Medication 1: at 21:46

## 2023-12-12 RX ADMIN — APIXABAN 2.5 MILLIGRAM(S): 2.5 TABLET, FILM COATED ORAL at 17:24

## 2023-12-12 RX ADMIN — LEVETIRACETAM 1000 MILLIGRAM(S): 250 TABLET, FILM COATED ORAL at 17:24

## 2023-12-12 RX ADMIN — TRAMADOL HYDROCHLORIDE 50 MILLIGRAM(S): 50 TABLET ORAL at 22:15

## 2023-12-12 RX ADMIN — TRAMADOL HYDROCHLORIDE 50 MILLIGRAM(S): 50 TABLET ORAL at 13:37

## 2023-12-12 RX ADMIN — Medication 2: at 18:04

## 2023-12-12 RX ADMIN — Medication 1 DROP(S): at 05:54

## 2023-12-12 RX ADMIN — Medication 50 MILLIGRAM(S): at 05:54

## 2023-12-12 RX ADMIN — Medication 2: at 09:02

## 2023-12-12 RX ADMIN — GABAPENTIN 300 MILLIGRAM(S): 400 CAPSULE ORAL at 21:45

## 2023-12-12 RX ADMIN — Medication 50 MILLIGRAM(S): at 18:05

## 2023-12-12 RX ADMIN — Medication 1 DROP(S): at 21:45

## 2023-12-12 NOTE — PROGRESS NOTE ADULT - ASSESSMENT
ECHO 10/3/22: EF 40-45%. Akinesis of the inferior with hypokinesis of the inferolateral, septum and anterolateral hypokinetic.    A/p  83y  woman with a PMHx significant for HTN, DM, HLD, CAD, MI, PAD, peripheral neuropathy, former smoker, NSCLC with ANTHONY primary tumor and brain metastasis s/p right craniotomy & resection on 7/27/22 and post-op RT on active treatment, STEMI (10/2022), afib on eliquis, hx of RML PE presenting with elevated lfts     #elevated lfts   -work up per hepatology  -? med induced   -hepatology eval noted  -CT abd/pelv noted   -MR abdomen noted   -Per heme/onc may require liver bx - if needed, remains cv stable to proceed    #Paroxysmal atrial fibrillation  -cont apixaban   -cont metoprolol    #hypertension  -stable  -cont bb    #Hx NSTEMI, CAD, s/p PCI  -STEMI in 8/2022 treated medically at the time in setting of recent craniotomy  -No chest pain or sob  -no statins given lfts  -Echo with nml lv fxn, mild MR   -Recommend starting asa if no c/i from neuro/neurosx    #Metast. Lung cancer to brain.   -s/p right craniotomy for resection of brain mass 7/27/22  -Heme/onc eval noted     #history of PE - dx in AUG 2022   -cont ac

## 2023-12-12 NOTE — PHYSICAL THERAPY INITIAL EVALUATION ADULT - ADDITIONAL COMMENTS
Pt lives with a 24/7 HHA in a private home, 3 steps to enter, no steps inside. Pt occasionally requires assistance and RW from her HHA for ambulation and ADLs.

## 2023-12-12 NOTE — PHYSICAL THERAPY INITIAL EVALUATION ADULT - PREDICTED DURATION OF THERAPY (DAYS/WKS), PT EVAL
[de-identified] : ABDIFATAH CARDOZA has a history of myringotomy on multiple occasions in the past. Otorrhea noted in December 2020 prior to Covid infection.  Feels better now with less hearing loss and no otorrhea. \par No sino nasal disease.\par \par 03/23/2021 \par Developed crusting in the right ear several days after surgery. Started on TobraDex last week but was unable to use it due to discomfort. Now with mild pain and otorrhea.\par \par On exam:\par Right ear-crusting and mild purulence in the meatus and ear canal debrided with suction. The tympanic membrane is acutely inflamed.\par \par \par 4/2/21: Patient presents today following up on right ear crusting. Patient admits to minimal ottorhea and scratching ear excessively with nails. Patient is s/p right tympanoplasty with Dr. Blanco 3/5/21.  [[email or web address]] : 06/18/21 : Patient presents today c/o  perforation of right tympanic membrane.  Has surgery in right ear 2 months ago , having issues hearing from it.  2 weeks

## 2023-12-12 NOTE — PROGRESS NOTE ADULT - SUBJECTIVE AND OBJECTIVE BOX
Progress Note   Shakila Smyth- PGY4- GI/Hep    SUBJECTIVE: Patient seen and examined at bedside.     OBJECTIVE:    VITAL SIGNS:  ICU Vital Signs Last 24 Hrs  T(C): 36.3 (12 Dec 2023 09:50), Max: 36.8 (11 Dec 2023 20:48)  T(F): 97.4 (12 Dec 2023 09:50), Max: 98.2 (11 Dec 2023 20:48)  HR: 62 (12 Dec 2023 09:50) (60 - 68)  BP: 125/71 (12 Dec 2023 09:50) (104/64 - 125/71)  BP(mean): --  ABP: --  ABP(mean): --  RR: 18 (12 Dec 2023 09:50) (16 - 18)  SpO2: 95% (12 Dec 2023 09:50) (93% - 99%)    O2 Parameters below as of 12 Dec 2023 09:50  Patient On (Oxygen Delivery Method): room air              12-11 @ 07:01  -  12-12 @ 07:00  --------------------------------------------------------  IN: 140 mL / OUT: 650 mL / NET: -510 mL    12-12 @ 07:01  -  12-12 @ 11:02  --------------------------------------------------------  IN: 100 mL / OUT: 200 mL / NET: -100 mL        PHYSICAL EXAM:    General: NAD  HEENT: NC/AT; PERRLA  Neck: supple  Respiratory: CTA b/l  Cardiovascular: +S1/S2; RRR  Abdomen: soft, NT/ND; +BS x4  Extremities: WWP, 2+ peripheral pulses b/l  Skin: normal color and turgor; no rash  Neurological: nonfocal     MEDICATIONS:  MEDICATIONS  (STANDING):  apixaban 2.5 milliGRAM(s) Oral every 12 hours  artificial tears (preservative free) Ophthalmic Solution 1 Drop(s) Both EYES three times a day  cholecalciferol 1000 Unit(s) Oral daily  dextrose 5%. 1000 milliLiter(s) (50 mL/Hr) IV Continuous <Continuous>  dextrose 5%. 1000 milliLiter(s) (100 mL/Hr) IV Continuous <Continuous>  dextrose 50% Injectable 12.5 Gram(s) IV Push once  dextrose 50% Injectable 25 Gram(s) IV Push once  dextrose 50% Injectable 25 Gram(s) IV Push once  gabapentin 300 milliGRAM(s) Oral at bedtime  gabapentin 100 milliGRAM(s) Oral daily  glucagon  Injectable 1 milliGRAM(s) IntraMuscular once  insulin lispro (ADMELOG) corrective regimen sliding scale   SubCutaneous at bedtime  insulin lispro (ADMELOG) corrective regimen sliding scale   SubCutaneous three times a day before meals  levETIRAcetam 1000 milliGRAM(s) Oral two times a day  metoprolol tartrate 50 milliGRAM(s) Oral two times a day  traMADol 50 milliGRAM(s) Oral three times a day    MEDICATIONS  (PRN):  dextrose Oral Gel 15 Gram(s) Oral once PRN Blood Glucose LESS THAN 70 milliGRAM(s)/deciliter      ALLERGIES:  Allergies    No Known Drug Allergies  adhesives (Pruritus)    Intolerances        LABS:                        11.1   5.43  )-----------( 242      ( 12 Dec 2023 07:21 )             33.4     12-12    137  |  102  |  25<H>  ----------------------------<  251<H>  3.6   |  24  |  1.07    Ca    8.8      12 Dec 2023 07:20  Mg     2.1     12-10    TPro  5.9<L>  /  Alb  2.9<L>  /  TBili  4.8<H>  /  DBili  3.6<H>  /  AST  978<H>  /  ALT  681<H>  /  AlkPhos  1037<H>  12-11      Urinalysis Basic - ( 12 Dec 2023 07:20 )    Color: x / Appearance: x / SG: x / pH: x  Gluc: 251 mg/dL / Ketone: x  / Bili: x / Urobili: x   Blood: x / Protein: x / Nitrite: x   Leuk Esterase: x / RBC: x / WBC x   Sq Epi: x / Non Sq Epi: x / Bacteria: x

## 2023-12-12 NOTE — PROGRESS NOTE ADULT - ASSESSMENT
84yo F w/ PMH of HTN, HLD, DM2, CAD c/b MI, Afib on Eliquis, PAD, metastatic lung CA w/ known brain mets s/p craniectomy previously on Keytruda pw elevated liver enzymes noted on outpt labs       Problem/Plan - 1:  ·  Problem: Hepatocellular injury.   ·  Plan: - ddx includes DILI 2/2 Keytruda vs less likely Keppra as has been on for a year vs AI vs less likely ischemic   - No longer on Keytruda  - US unremarkable   - Order AI w/u  - MR Abdomen w/ IV   - acute viral hepatitis panel, plus HBV PCR, HCV PCR, HEV IgM/IgG  - ASMA, AMA, LKM  - trend LFTs Q8H  - full infectious workup  - UTox   follow up with oncology re: alternatives to Keytruda for further treatment; if no alternatives, can consider liver biopsy to confirm diagnosis; however, if alternatives available, then would defer bx as liver numbers are improving  - trend LFTs  -  follow up with oncology re: alternatives to Keytruda for further treatment; if no alternatives, can consider liver biopsy to confirm diagnosis; however, if alternatives available, then would defer bx as liver numbers are improving  - BCx NGTD  - plan as per hepatology     Problem/Plan - 2:  ·  Problem: Chronic atrial fibrillation.   ·  Plan: - On Eliquis 2.5mg BID, continue   - c/w Lopressor 50mg BID.     Problem/Plan - 3:  ·  Problem: Type 2 diabetes mellitus.   -  AM A1c 7.8  - SALAS for now  - CC diet  - Diabetic education.     Problem/Plan - 4:  ·  Problem: HTN (hypertension).   ·  Plan: - c/w home medications w/ hold parameters.     Problem/Plan - 5:  ·  Problem: HLD (hyperlipidemia).   ·  Plan: - No statin given elevated liver enzymes.     Problem/Plan - 6:  ·  Problem: CAD (coronary artery disease).   ·  Plan: - On Eliquis.     Problem/Plan - 7:  ·  Problem: Primary lung cancer with metastasis from lung to other site.   ·  Plan: - Holding Keytruda  - c/w Keppra for brain mets.     Problem/Plan - 8:  ·  Problem: Prophylactic measure.   ·  Plan: DVT ppx: On Eliquis  Diet: DASH/CC  Dispo: pending clinical improvement.

## 2023-12-12 NOTE — PROGRESS NOTE ADULT - SUBJECTIVE AND OBJECTIVE BOX
CARDIOLOGY FOLLOW UP - Dr. Alcaraz  DATE OF SERVICE: 12/12/23    CC  No CV complaints     REVIEW OF SYSTEMS:  CONSTITUTIONAL: No fever, weight loss, or fatigue  RESPIRATORY: No cough, wheezing, chills or hemoptysis; No Shortness of Breath  CARDIOVASCULAR: No chest pain, palpitations, passing out, dizziness, or leg swelling  GASTROINTESTINAL: No abdominal or epigastric pain. No nausea, vomiting, or hematemesis; No diarrhea or constipation. No melena or hematochezia.  VASCULAR: No edema     PHYSICAL EXAM:  T(C): 36.4 (12-12-23 @ 05:52), Max: 36.8 (12-11-23 @ 20:48)  HR: 60 (12-12-23 @ 05:52) (60 - 68)  BP: 124/62 (12-12-23 @ 05:52) (104/64 - 124/62)  RR: 16 (12-12-23 @ 05:52) (16 - 18)  SpO2: 96% (12-12-23 @ 05:52) (93% - 99%)  Wt(kg): --  I&O's Summary    11 Dec 2023 07:01  -  12 Dec 2023 07:00  --------------------------------------------------------  IN: 140 mL / OUT: 650 mL / NET: -510 mL        Appearance: Normal	  Cardiovascular: Normal S1 S2,RRR, No JVD, No murmurs  Respiratory: Lungs clear to auscultation b/l   Gastrointestinal:  Soft, Non-tender, + BS	  Extremities: Normal range of motion, No clubbing, cyanosis or edema      Home Medications:  gabapentin 100 mg oral capsule: 1 cap(s) orally once a day (in the morning) (09 Dec 2023 15:06)  gabapentin 300 mg oral capsule: 1 cap(s) orally once a day (in the evening) (09 Dec 2023 15:06)  metFORMIN 1000 mg oral tablet: 1 tab(s) orally 2 times a day (11 Dec 2023 10:23)  metoprolol tartrate 50 mg oral tablet: 1 tab(s) orally 2 times a day note: rx has 3 times per day. (11 Dec 2023 10:23)  Ozempic 2 mg/1.5 mL (0.25 mg or 0.5 mg dose) subcutaneous solution: 0.25 milligram(s) subcutaneously once a week on tuesday (11 Dec 2023 10:23)  traMADol 50 mg oral tablet: 1 tab(s) orally 3 times a day (09 Dec 2023 15:04)  Tresiba FlexTouch 100 units/mL subcutaneous solution: 14 unit(s) subcutaneous once a day (11 Dec 2023 10:23)  Vitamin B-12: 1 tab once a day (09 Dec 2023 15:04)  Vitamin D3 25 mcg (1000 intl units) oral tablet: 1 tab(s) orally once a day (09 Dec 2023 15:04)      MEDICATIONS  (STANDING):  apixaban 2.5 milliGRAM(s) Oral every 12 hours  artificial tears (preservative free) Ophthalmic Solution 1 Drop(s) Both EYES three times a day  cholecalciferol 1000 Unit(s) Oral daily  dextrose 5%. 1000 milliLiter(s) (50 mL/Hr) IV Continuous <Continuous>  dextrose 5%. 1000 milliLiter(s) (100 mL/Hr) IV Continuous <Continuous>  dextrose 50% Injectable 12.5 Gram(s) IV Push once  dextrose 50% Injectable 25 Gram(s) IV Push once  dextrose 50% Injectable 25 Gram(s) IV Push once  gabapentin 300 milliGRAM(s) Oral at bedtime  gabapentin 100 milliGRAM(s) Oral daily  glucagon  Injectable 1 milliGRAM(s) IntraMuscular once  insulin lispro (ADMELOG) corrective regimen sliding scale   SubCutaneous at bedtime  insulin lispro (ADMELOG) corrective regimen sliding scale   SubCutaneous three times a day before meals  levETIRAcetam 1000 milliGRAM(s) Oral two times a day  metoprolol tartrate 50 milliGRAM(s) Oral two times a day  traMADol 50 milliGRAM(s) Oral three times a day      TELEMETRY: 	    ECG:  	  RADIOLOGY:   < from: MR Abdomen w/wo IV Cont (12.11.23 @ 11:41) >  IMPRESSION:  *  Mild periportal edema. Otherwise unremarkable appearance of the liver.  *  No biliary duct dilation.  *  Mild nonspecific edematous gallbladder wall thickening, potentially   reactive to the liver. Consider correlation with right upper quadrant   ultrasound as warranted.  *  Nonspecific periportal lymphadenopathy.        --- End of Report ---    < end of copied text >    DIAGNOSTIC TESTING:  [x] Echocardiogram:  < from: TTE W or WO Ultrasound Enhancing Agent (12.11.23 @ 10:05) >  CONCLUSIONS:      1. Technically difficult image quality.   2. Left ventricular cavity is normal. Left ventricular wall thickness is normal. Left ventricular systolic function is normal with an ejection fraction of 53 % by Eaton's method of disks. Regional wall motion abnormalities present.   3. Normal right ventricular cavity size, wall thickness, and systolic function.   4. There is mild mitral regurgitation.   5. Pulmonary artery systolic pressure could not be estimated.   6. No pericardial effusion seen.   7. No prior echocardiogram is available for comparison.    < end of copied text >    [ ]  Catheterization:  [ ] Stress Test:    OTHER: 	    LABS:	 	                            11.1   5.43  )-----------( 242      ( 12 Dec 2023 07:21 )             33.4     12-12    137  |  102  |  25<H>  ----------------------------<  251<H>  3.6   |  24  |  1.07    Ca    8.8      12 Dec 2023 07:20  Mg     2.1     12-10    TPro  5.9<L>  /  Alb  2.9<L>  /  TBili  4.8<H>  /  DBili  3.6<H>  /  AST  978<H>  /  ALT  681<H>  /  AlkPhos  1037<H>  12-11

## 2023-12-12 NOTE — PROGRESS NOTE ADULT - ASSESSMENT
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 autoimmune or viral hepatitis vs. less likely ischemic hepatitis  #coagulopathy, INR 1.47 (on Eliquis at home)  #A&Ox3; no asterixis  - AST/ALT 1400/900; TB 5.9; ALP 1300s on presentation; previously AST/ALT 400s/300s (11/9/23); and prior to that, numbers were normal  - last dose of Keytruda 11/9/23  - recent outpatient workup: negative HAV IgG, EBV PCR, CMV PCR, ANNETTE, IGG, M2 Ab  - US Abd 11/16 normal liver; CBD 4mm  - no alcohol, Tylenol use, or herbal supplement usage  - acute hepatitis panel negative    Recommendations  - please start 40mg prednisone for DILI  - follow up with oncology re: alternatives to Keytruda for further treatment; if no alternatives, can consider liver biopsy to confirm diagnosis; however, if alternatives available, then would defer bx as liver numbers are improving  - trend CMP and INR daily     Note and recommendations are incomplete until reviewed and attested by attending.    Shakila Smyth MD  Gastroenterology/Hepatology Fellow, PGY-4  Please contact via TEAMS    NON-URGENT CONSULTS:  Please email [email or web address]  AT NIGHT AND ON WEEKENDS:  Contact on-call GI fellow via answering service ([phone]) from 5pm-8am and on weekends/holidays  MONDAY-FRIDAY 8AM-5PM:  Pager# [phone]   83F with hx of hypertension, hyperlipidemia, NSTEMI, PAD, A-fib on Eliquis, DM, metastatic lung CA with known brain mets s/p craniectomy/resection/radiation therapy followed by Keytruda (last dose >3 weeks ago; stopped due to suspected hepatotoxicity) presenting with worsening LFT elevation    Impression  #hepatocellular injury; DDx include DILI 2/2 Keytruda vs. autoimmune or viral hepatitis vs. less likely ischemic hepatitis  #coagulopathy, INR 1.47 (on Eliquis at home)  #A&Ox3; no asterixis  - AST/ALT 1400/900; TB 5.9; ALP 1300s on presentation; previously AST/ALT 400s/300s (11/9/23); and prior to that, numbers were normal  - last dose of Keytruda 11/9/23  - recent outpatient workup: negative HAV IgG, EBV PCR, CMV PCR, ANNETTE, IGG, M2 Ab  - US Abd 11/16 normal liver; CBD 4mm  - no alcohol, Tylenol use, or herbal supplement usage  - acute hepatitis panel negative    Recommendations  - please start 40mg prednisone for DILI  - follow up with oncology re: alternatives to Keytruda for further treatment; if no alternatives, can consider liver biopsy to confirm diagnosis; however, if alternatives available, then would defer bx as liver numbers are improving  - trend CMP and INR daily     Note and recommendations are incomplete until reviewed and attested by attending.    Shakila Smyth MD  Gastroenterology/Hepatology Fellow, PGY-4  Please contact via TEAMS    NON-URGENT CONSULTS:  Please email [email or web address]  AT NIGHT AND ON WEEKENDS:  Contact on-call GI fellow via answering service ([phone]) from 5pm-8am and on weekends/holidays  MONDAY-FRIDAY 8AM-5PM:  Pager# [phone]   83F with hx of hypertension, hyperlipidemia, NSTEMI, PAD, A-fib on Eliquis, DM, metastatic lung CA with known brain mets s/p craniectomy/resection/radiation therapy followed by Keytruda (last dose >3 weeks ago; stopped due to suspected hepatotoxicity) presenting with worsening LFT elevation    Impression  #hepatocellular injury; DDx include DILI 2/2 Keytruda vs. autoimmune or viral hepatitis vs. less likely ischemic hepatitis  #coagulopathy, INR 1.47 (on Eliquis at home)  #A&Ox3; no asterixis  - AST/ALT 1400/900; TB 5.9; ALP 1300s on presentation; previously AST/ALT 400s/300s (11/9/23); and prior to that, numbers were normal  - last dose of Keytruda 11/9/23  - recent outpatient workup: negative HAV IgG, EBV PCR, CMV PCR, ANNETTE, IGG, M2 Ab  - US Abd 11/16 normal liver; CBD 4mm  - no alcohol, Tylenol use, or herbal supplement usage  - acute hepatitis panel negative    Recommendations  - please start 40mg prednisone for DILI  - follow up with oncology re: alternatives to Keytruda for further treatment; if no alternatives, can consider liver biopsy to confirm diagnosis; however, if alternatives available, then would defer bx as liver numbers are improving and DILI is most likely cause of liver injury  - trend CMP and INR daily     Note and recommendations are incomplete until reviewed and attested by attending.    Shakila Smyth MD  Gastroenterology/Hepatology Fellow, PGY-4  Please contact via TEAMS    NON-URGENT CONSULTS:  Please email [email or web address]  AT NIGHT AND ON WEEKENDS:  Contact on-call GI fellow via answering service ([phone]) from 5pm-8am and on weekends/holidays  MONDAY-FRIDAY 8AM-5PM:  Pager# [phone]   83F with hx of hypertension, hyperlipidemia, NSTEMI, PAD, A-fib on Eliquis, DM, metastatic lung CA with known brain mets s/p craniectomy/resection/radiation therapy followed by Keytruda (last dose >3 weeks ago; stopped due to suspected hepatotoxicity) presenting with worsening LFT elevation    Impression  #hepatocellular injury; DDx include DILI 2/2 Keytruda vs. autoimmune or viral hepatitis vs. less likely ischemic hepatitis  #coagulopathy, INR 1.47 (on Eliquis at home)  #A&Ox3; no asterixis  - AST/ALT 1400/900; TB 5.9; ALP 1300s on presentation; previously AST/ALT 400s/300s (11/9/23); and prior to that, numbers were normal  - last dose of Keytruda 11/9/23  - recent outpatient workup: negative HAV IgG, EBV PCR, CMV PCR, ANNETTE, IGG, M2 Ab  - US Abd 11/16 normal liver; CBD 4mm  - no alcohol, Tylenol use, or herbal supplement usage  - acute hepatitis panel negative    Recommendations  - please start 40mg prednisone for DILI  - follow up with oncology re: alternatives to Keytruda for further treatment; if no alternatives, can consider liver biopsy to confirm diagnosis; however, if alternatives available, then would defer bx as liver numbers are improving and DILI is most likely cause of liver injury  - trend CMP and INR daily     Note and recommendations are incomplete until reviewed and attested by attending.    Shakila Smyth MD  Gastroenterology/Hepatology Fellow, PGY-4  Please contact via TEAMS    NON-URGENT CONSULTS:  Please email [email or web address]  AT NIGHT AND ON WEEKENDS:  Contact on-call GI fellow via answering service ([phone]) from 5pm-8am and on weekends/holidays  MONDAY-FRIDAY 8AM-5PM:  Pager# [phone]

## 2023-12-12 NOTE — PROGRESS NOTE ADULT - SUBJECTIVE AND OBJECTIVE BOX
DATE OF SERVICE: 12-12-23 @ 11:12    Patient is a 83y old  Female who presents with a chief complaint of Elevated liver enzymes (12 Dec 2023 08:50)      SUBJECTIVE / OVERNIGHT EVENTS:  No chest pain. No shortness of breath. No complaints. No events overnight.     MEDICATIONS  (STANDING):  apixaban 2.5 milliGRAM(s) Oral every 12 hours  artificial tears (preservative free) Ophthalmic Solution 1 Drop(s) Both EYES three times a day  cholecalciferol 1000 Unit(s) Oral daily  dextrose 5%. 1000 milliLiter(s) (50 mL/Hr) IV Continuous <Continuous>  dextrose 5%. 1000 milliLiter(s) (100 mL/Hr) IV Continuous <Continuous>  dextrose 50% Injectable 12.5 Gram(s) IV Push once  dextrose 50% Injectable 25 Gram(s) IV Push once  dextrose 50% Injectable 25 Gram(s) IV Push once  gabapentin 300 milliGRAM(s) Oral at bedtime  gabapentin 100 milliGRAM(s) Oral daily  glucagon  Injectable 1 milliGRAM(s) IntraMuscular once  insulin lispro (ADMELOG) corrective regimen sliding scale   SubCutaneous at bedtime  insulin lispro (ADMELOG) corrective regimen sliding scale   SubCutaneous three times a day before meals  levETIRAcetam 1000 milliGRAM(s) Oral two times a day  metoprolol tartrate 50 milliGRAM(s) Oral two times a day  traMADol 50 milliGRAM(s) Oral three times a day    MEDICATIONS  (PRN):  dextrose Oral Gel 15 Gram(s) Oral once PRN Blood Glucose LESS THAN 70 milliGRAM(s)/deciliter      Vital Signs Last 24 Hrs  T(C): 36.3 (12 Dec 2023 09:50), Max: 36.8 (11 Dec 2023 20:48)  T(F): 97.4 (12 Dec 2023 09:50), Max: 98.2 (11 Dec 2023 20:48)  HR: 62 (12 Dec 2023 09:50) (60 - 68)  BP: 125/71 (12 Dec 2023 09:50) (104/64 - 125/71)  BP(mean): --  RR: 18 (12 Dec 2023 09:50) (16 - 18)  SpO2: 95% (12 Dec 2023 09:50) (93% - 99%)    Parameters below as of 12 Dec 2023 09:50  Patient On (Oxygen Delivery Method): room air      CAPILLARY BLOOD GLUCOSE      POCT Blood Glucose.: 231 mg/dL (12 Dec 2023 09:00)  POCT Blood Glucose.: 328 mg/dL (11 Dec 2023 21:28)  POCT Blood Glucose.: 300 mg/dL (11 Dec 2023 17:25)  POCT Blood Glucose.: 272 mg/dL (11 Dec 2023 13:04)    I&O's Summary    11 Dec 2023 07:01  -  12 Dec 2023 07:00  --------------------------------------------------------  IN: 140 mL / OUT: 650 mL / NET: -510 mL    12 Dec 2023 07:01  -  12 Dec 2023 11:12  --------------------------------------------------------  IN: 100 mL / OUT: 200 mL / NET: -100 mL        PHYSICAL EXAM:  GENERAL: NAD, well-developed  HEAD:  Atraumatic, Normocephalic  EYES: EOMI, PERRLA, conjunctiva and sclera clear  NECK: Supple, No JVD  CHEST/LUNG: Clear to auscultation bilaterally; No wheeze  HEART: Regular rate and rhythm; No murmurs, rubs, or gallops  ABDOMEN: Soft, Nontender, Nondistended; Bowel sounds present  EXTREMITIES:  2+ Peripheral Pulses, No clubbing, cyanosis, or edema  PSYCH: AAOx3  NEUROLOGY: non-focal  SKIN: No rashes or lesions    LABS:                        11.1   5.43  )-----------( 242      ( 12 Dec 2023 07:21 )             33.4     12-12    137  |  102  |  25<H>  ----------------------------<  251<H>  3.6   |  24  |  1.07    Ca    8.8      12 Dec 2023 07:20  Mg     2.1     12-10    TPro  5.9<L>  /  Alb  2.9<L>  /  TBili  4.8<H>  /  DBili  3.6<H>  /  AST  978<H>  /  ALT  681<H>  /  AlkPhos  1037<H>  12-11          Urinalysis Basic - ( 12 Dec 2023 07:20 )    Color: x / Appearance: x / SG: x / pH: x  Gluc: 251 mg/dL / Ketone: x  / Bili: x / Urobili: x   Blood: x / Protein: x / Nitrite: x   Leuk Esterase: x / RBC: x / WBC x   Sq Epi: x / Non Sq Epi: x / Bacteria: x        RADIOLOGY & ADDITIONAL TESTS:    Imaging Personally Reviewed:    Consultant(s) Notes Reviewed:      Care Discussed with Consultants/Other Providers:

## 2023-12-12 NOTE — PHYSICAL THERAPY INITIAL EVALUATION ADULT - PERTINENT HX OF CURRENT PROBLEM, REHAB EVAL
82yo F w/ PMH of HTN, HLD, DM2, CAD c/b MI, Afib on Eliquis, PAD, metastatic lung CA w/ known brain mets s/p craniectomy previously on Keytruda pw elevated liver enzymes noted on outpt labs. Only symptoms she endorses are feeling weak w/ decreased appetite x1 yr since craniectomy. Went to endo visit about 2-3 weeks ago and was noted to have increased liver enzymes which continued to worsen over that time period, leading ot her presentation to [facility]. 12/11 MRI Abd:  Mild periportal edema. Otherwise unremarkable appearance of the liver. No biliary duct dilation. Mild nonspecific edematous gallbladder wall thickening, potentially reactive to the liver. Consider correlation with right upper quadrant   ultrasound as warranted. Nonspecific periportal lymphadenopathy. 84yo F w/ PMH of HTN, HLD, DM2, CAD c/b MI, Afib on Eliquis, PAD, metastatic lung CA w/ known brain mets s/p craniectomy previously on Keytruda pw elevated liver enzymes noted on outpt labs. Only symptoms she endorses are feeling weak w/ decreased appetite x1 yr since craniectomy. Went to endo visit about 2-3 weeks ago and was noted to have increased liver enzymes which continued to worsen over that time period, leading ot her presentation to [facility]. 12/11 MRI Abd:  Mild periportal edema. Otherwise unremarkable appearance of the liver. No biliary duct dilation. Mild nonspecific edematous gallbladder wall thickening, potentially reactive to the liver. Consider correlation with right upper quadrant   ultrasound as warranted. Nonspecific periportal lymphadenopathy.

## 2023-12-13 LAB
-  AMPICILLIN: SIGNIFICANT CHANGE UP
-  AMPICILLIN: SIGNIFICANT CHANGE UP
-  CIPROFLOXACIN: SIGNIFICANT CHANGE UP
-  CIPROFLOXACIN: SIGNIFICANT CHANGE UP
-  LEVOFLOXACIN: SIGNIFICANT CHANGE UP
-  LEVOFLOXACIN: SIGNIFICANT CHANGE UP
-  NITROFURANTOIN: SIGNIFICANT CHANGE UP
-  NITROFURANTOIN: SIGNIFICANT CHANGE UP
-  TETRACYCLINE: SIGNIFICANT CHANGE UP
-  TETRACYCLINE: SIGNIFICANT CHANGE UP
-  VANCOMYCIN: SIGNIFICANT CHANGE UP
-  VANCOMYCIN: SIGNIFICANT CHANGE UP
ALBUMIN SERPL ELPH-MCNC: 3.4 G/DL — SIGNIFICANT CHANGE UP (ref 3.3–5)
ALBUMIN SERPL ELPH-MCNC: 3.4 G/DL — SIGNIFICANT CHANGE UP (ref 3.3–5)
ALP SERPL-CCNC: 963 U/L — HIGH (ref 40–120)
ALP SERPL-CCNC: 963 U/L — HIGH (ref 40–120)
ALT FLD-CCNC: 667 U/L — HIGH (ref 10–45)
ALT FLD-CCNC: 667 U/L — HIGH (ref 10–45)
AMPHET UR-MCNC: NEGATIVE NG/ML — SIGNIFICANT CHANGE UP
AMPHET UR-MCNC: NEGATIVE NG/ML — SIGNIFICANT CHANGE UP
ANION GAP SERPL CALC-SCNC: 12 MMOL/L — SIGNIFICANT CHANGE UP (ref 5–17)
ANION GAP SERPL CALC-SCNC: 12 MMOL/L — SIGNIFICANT CHANGE UP (ref 5–17)
AST SERPL-CCNC: 901 U/L — HIGH (ref 10–40)
AST SERPL-CCNC: 901 U/L — HIGH (ref 10–40)
BARBITURATES UR QL SCN: NEGATIVE NG/ML — SIGNIFICANT CHANGE UP
BARBITURATES UR QL SCN: NEGATIVE NG/ML — SIGNIFICANT CHANGE UP
BARBITURATES UR-MCNC: NEGATIVE NG/ML — SIGNIFICANT CHANGE UP
BARBITURATES UR-MCNC: NEGATIVE NG/ML — SIGNIFICANT CHANGE UP
BENZODIAZ UR-MCNC: NEGATIVE NG/ML — SIGNIFICANT CHANGE UP
BENZODIAZ UR-MCNC: NEGATIVE NG/ML — SIGNIFICANT CHANGE UP
BILIRUB DIRECT SERPL-MCNC: 3.2 MG/DL — HIGH (ref 0–0.3)
BILIRUB DIRECT SERPL-MCNC: 3.2 MG/DL — HIGH (ref 0–0.3)
BILIRUB INDIRECT FLD-MCNC: 1.1 MG/DL — HIGH (ref 0.2–1)
BILIRUB INDIRECT FLD-MCNC: 1.1 MG/DL — HIGH (ref 0.2–1)
BILIRUB SERPL-MCNC: 4.3 MG/DL — HIGH (ref 0.2–1.2)
BILIRUB SERPL-MCNC: 4.3 MG/DL — HIGH (ref 0.2–1.2)
BUN SERPL-MCNC: 27 MG/DL — HIGH (ref 7–23)
BUN SERPL-MCNC: 27 MG/DL — HIGH (ref 7–23)
CALCIUM SERPL-MCNC: 9.1 MG/DL — SIGNIFICANT CHANGE UP (ref 8.4–10.5)
CALCIUM SERPL-MCNC: 9.1 MG/DL — SIGNIFICANT CHANGE UP (ref 8.4–10.5)
CANNABINOID RESULT, UR: POSITIVE
CANNABINOID RESULT, UR: POSITIVE
CANNABINOIDS UR QL SCN: POSITIVE
CANNABINOIDS UR QL SCN: POSITIVE
CHLORIDE SERPL-SCNC: 101 MMOL/L — SIGNIFICANT CHANGE UP (ref 96–108)
CHLORIDE SERPL-SCNC: 101 MMOL/L — SIGNIFICANT CHANGE UP (ref 96–108)
CO2 SERPL-SCNC: 25 MMOL/L — SIGNIFICANT CHANGE UP (ref 22–31)
CO2 SERPL-SCNC: 25 MMOL/L — SIGNIFICANT CHANGE UP (ref 22–31)
COCAINE METAB.OTHER UR-MCNC: NEGATIVE NG/ML — SIGNIFICANT CHANGE UP
COCAINE METAB.OTHER UR-MCNC: NEGATIVE NG/ML — SIGNIFICANT CHANGE UP
CREAT SERPL-MCNC: 1.17 MG/DL — SIGNIFICANT CHANGE UP (ref 0.5–1.3)
CREAT SERPL-MCNC: 1.17 MG/DL — SIGNIFICANT CHANGE UP (ref 0.5–1.3)
CREATININE, URINE THERAPEUTIC: 64.2 MG/DL — SIGNIFICANT CHANGE UP (ref 20–300)
CREATININE, URINE THERAPEUTIC: 64.2 MG/DL — SIGNIFICANT CHANGE UP (ref 20–300)
EGFR: 46 ML/MIN/1.73M2 — LOW
EGFR: 46 ML/MIN/1.73M2 — LOW
FENTANYL RESULT, UR: NEGATIVE NG/ML — SIGNIFICANT CHANGE UP
FENTANYL RESULT, UR: NEGATIVE NG/ML — SIGNIFICANT CHANGE UP
FENTANYL UR QL SCN: NEGATIVE NG/ML — SIGNIFICANT CHANGE UP
FENTANYL UR QL SCN: NEGATIVE NG/ML — SIGNIFICANT CHANGE UP
GLUCOSE BLDC GLUCOMTR-MCNC: 291 MG/DL — HIGH (ref 70–99)
GLUCOSE BLDC GLUCOMTR-MCNC: 291 MG/DL — HIGH (ref 70–99)
GLUCOSE BLDC GLUCOMTR-MCNC: 305 MG/DL — HIGH (ref 70–99)
GLUCOSE BLDC GLUCOMTR-MCNC: 305 MG/DL — HIGH (ref 70–99)
GLUCOSE BLDC GLUCOMTR-MCNC: 315 MG/DL — HIGH (ref 70–99)
GLUCOSE BLDC GLUCOMTR-MCNC: 315 MG/DL — HIGH (ref 70–99)
GLUCOSE BLDC GLUCOMTR-MCNC: 408 MG/DL — HIGH (ref 70–99)
GLUCOSE SERPL-MCNC: 237 MG/DL — HIGH (ref 70–99)
GLUCOSE SERPL-MCNC: 237 MG/DL — HIGH (ref 70–99)
HCT VFR BLD CALC: 35.7 % — SIGNIFICANT CHANGE UP (ref 34.5–45)
HCT VFR BLD CALC: 35.7 % — SIGNIFICANT CHANGE UP (ref 34.5–45)
HGB BLD-MCNC: 11.8 G/DL — SIGNIFICANT CHANGE UP (ref 11.5–15.5)
HGB BLD-MCNC: 11.8 G/DL — SIGNIFICANT CHANGE UP (ref 11.5–15.5)
Lab: SIGNIFICANT CHANGE UP
Lab: SIGNIFICANT CHANGE UP
MCHC RBC-ENTMCNC: 30 PG — SIGNIFICANT CHANGE UP (ref 27–34)
MCHC RBC-ENTMCNC: 30 PG — SIGNIFICANT CHANGE UP (ref 27–34)
MCHC RBC-ENTMCNC: 33.1 GM/DL — SIGNIFICANT CHANGE UP (ref 32–36)
MCHC RBC-ENTMCNC: 33.1 GM/DL — SIGNIFICANT CHANGE UP (ref 32–36)
MCV RBC AUTO: 90.8 FL — SIGNIFICANT CHANGE UP (ref 80–100)
MCV RBC AUTO: 90.8 FL — SIGNIFICANT CHANGE UP (ref 80–100)
METHADONE UR-MCNC: NEGATIVE NG/ML — SIGNIFICANT CHANGE UP
METHADONE UR-MCNC: NEGATIVE NG/ML — SIGNIFICANT CHANGE UP
METHOD TYPE: SIGNIFICANT CHANGE UP
METHOD TYPE: SIGNIFICANT CHANGE UP
NRBC # BLD: 0 /100 WBCS — SIGNIFICANT CHANGE UP (ref 0–0)
NRBC # BLD: 0 /100 WBCS — SIGNIFICANT CHANGE UP (ref 0–0)
OPIATES UR-MCNC: NEGATIVE NG/ML — SIGNIFICANT CHANGE UP
OPIATES UR-MCNC: NEGATIVE NG/ML — SIGNIFICANT CHANGE UP
OXYCODONE UR QL SCN: NEGATIVE NG/ML — SIGNIFICANT CHANGE UP
OXYCODONE UR QL SCN: NEGATIVE NG/ML — SIGNIFICANT CHANGE UP
PCP UR-MCNC: NEGATIVE NG/ML — SIGNIFICANT CHANGE UP
PCP UR-MCNC: NEGATIVE NG/ML — SIGNIFICANT CHANGE UP
PH, URINE RESULT: 5.3 — SIGNIFICANT CHANGE UP (ref 4.5–8.9)
PH, URINE RESULT: 5.3 — SIGNIFICANT CHANGE UP (ref 4.5–8.9)
PLATELET # BLD AUTO: 276 K/UL — SIGNIFICANT CHANGE UP (ref 150–400)
PLATELET # BLD AUTO: 276 K/UL — SIGNIFICANT CHANGE UP (ref 150–400)
POTASSIUM SERPL-MCNC: 3.6 MMOL/L — SIGNIFICANT CHANGE UP (ref 3.5–5.3)
POTASSIUM SERPL-MCNC: 3.6 MMOL/L — SIGNIFICANT CHANGE UP (ref 3.5–5.3)
POTASSIUM SERPL-SCNC: 3.6 MMOL/L — SIGNIFICANT CHANGE UP (ref 3.5–5.3)
POTASSIUM SERPL-SCNC: 3.6 MMOL/L — SIGNIFICANT CHANGE UP (ref 3.5–5.3)
PROT SERPL-MCNC: 5.9 G/DL — LOW (ref 6–8.3)
PROT SERPL-MCNC: 5.9 G/DL — LOW (ref 6–8.3)
RBC # BLD: 3.93 M/UL — SIGNIFICANT CHANGE UP (ref 3.8–5.2)
RBC # BLD: 3.93 M/UL — SIGNIFICANT CHANGE UP (ref 3.8–5.2)
RBC # FLD: 15.5 % — HIGH (ref 10.3–14.5)
RBC # FLD: 15.5 % — HIGH (ref 10.3–14.5)
SODIUM SERPL-SCNC: 138 MMOL/L — SIGNIFICANT CHANGE UP (ref 135–145)
SODIUM SERPL-SCNC: 138 MMOL/L — SIGNIFICANT CHANGE UP (ref 135–145)
THC UR CFM-MCNC: 59 NG/ML — SIGNIFICANT CHANGE UP
THC UR CFM-MCNC: 59 NG/ML — SIGNIFICANT CHANGE UP
THC UR QL: SIGNIFICANT CHANGE UP NG/ML
THC UR QL: SIGNIFICANT CHANGE UP NG/ML
WBC # BLD: 5.44 K/UL — SIGNIFICANT CHANGE UP (ref 3.8–10.5)
WBC # BLD: 5.44 K/UL — SIGNIFICANT CHANGE UP (ref 3.8–10.5)
WBC # FLD AUTO: 5.44 K/UL — SIGNIFICANT CHANGE UP (ref 3.8–10.5)
WBC # FLD AUTO: 5.44 K/UL — SIGNIFICANT CHANGE UP (ref 3.8–10.5)

## 2023-12-13 PROCEDURE — 99232 SBSQ HOSP IP/OBS MODERATE 35: CPT | Mod: GC

## 2023-12-13 RX ORDER — INSULIN LISPRO 100/ML
5 VIAL (ML) SUBCUTANEOUS
Refills: 0 | Status: DISCONTINUED | OUTPATIENT
Start: 2023-12-13 | End: 2023-12-13

## 2023-12-13 RX ORDER — INSULIN GLARGINE 100 [IU]/ML
15 INJECTION, SOLUTION SUBCUTANEOUS AT BEDTIME
Refills: 0 | Status: DISCONTINUED | OUTPATIENT
Start: 2023-12-13 | End: 2023-12-14

## 2023-12-13 RX ORDER — INSULIN LISPRO 100/ML
7 VIAL (ML) SUBCUTANEOUS
Refills: 0 | Status: DISCONTINUED | OUTPATIENT
Start: 2023-12-13 | End: 2023-12-14

## 2023-12-13 RX ADMIN — TRAMADOL HYDROCHLORIDE 50 MILLIGRAM(S): 50 TABLET ORAL at 13:15

## 2023-12-13 RX ADMIN — Medication 50 MILLIGRAM(S): at 18:14

## 2023-12-13 RX ADMIN — Medication 5 UNIT(S): at 14:02

## 2023-12-13 RX ADMIN — Medication 3: at 10:08

## 2023-12-13 RX ADMIN — Medication 1 DROP(S): at 05:36

## 2023-12-13 RX ADMIN — Medication 6: at 14:01

## 2023-12-13 RX ADMIN — TRAMADOL HYDROCHLORIDE 50 MILLIGRAM(S): 50 TABLET ORAL at 14:15

## 2023-12-13 RX ADMIN — APIXABAN 2.5 MILLIGRAM(S): 2.5 TABLET, FILM COATED ORAL at 05:36

## 2023-12-13 RX ADMIN — LEVETIRACETAM 1000 MILLIGRAM(S): 250 TABLET, FILM COATED ORAL at 05:36

## 2023-12-13 RX ADMIN — INSULIN GLARGINE 15 UNIT(S): 100 INJECTION, SOLUTION SUBCUTANEOUS at 22:07

## 2023-12-13 RX ADMIN — TRAMADOL HYDROCHLORIDE 50 MILLIGRAM(S): 50 TABLET ORAL at 22:37

## 2023-12-13 RX ADMIN — Medication 1 DROP(S): at 13:14

## 2023-12-13 RX ADMIN — Medication 7 UNIT(S): at 18:24

## 2023-12-13 RX ADMIN — Medication 2: at 22:05

## 2023-12-13 RX ADMIN — Medication 4: at 18:24

## 2023-12-13 RX ADMIN — Medication 50 MILLIGRAM(S): at 05:35

## 2023-12-13 RX ADMIN — TRAMADOL HYDROCHLORIDE 50 MILLIGRAM(S): 50 TABLET ORAL at 06:05

## 2023-12-13 RX ADMIN — Medication 1 DROP(S): at 22:06

## 2023-12-13 RX ADMIN — TRAMADOL HYDROCHLORIDE 50 MILLIGRAM(S): 50 TABLET ORAL at 22:07

## 2023-12-13 RX ADMIN — Medication 1000 UNIT(S): at 13:14

## 2023-12-13 RX ADMIN — TRAMADOL HYDROCHLORIDE 50 MILLIGRAM(S): 50 TABLET ORAL at 05:35

## 2023-12-13 RX ADMIN — Medication 40 MILLIGRAM(S): at 05:35

## 2023-12-13 RX ADMIN — GABAPENTIN 300 MILLIGRAM(S): 400 CAPSULE ORAL at 22:07

## 2023-12-13 RX ADMIN — GABAPENTIN 100 MILLIGRAM(S): 400 CAPSULE ORAL at 13:14

## 2023-12-13 RX ADMIN — APIXABAN 2.5 MILLIGRAM(S): 2.5 TABLET, FILM COATED ORAL at 18:14

## 2023-12-13 RX ADMIN — LEVETIRACETAM 1000 MILLIGRAM(S): 250 TABLET, FILM COATED ORAL at 18:14

## 2023-12-13 NOTE — PROGRESS NOTE ADULT - ASSESSMENT
84yo F w/ PMH of HTN, HLD, DM2, CAD c/b MI, Afib on Eliquis, PAD, metastatic lung CA w/ known brain mets s/p craniectomy previously on Keytruda pw elevated liver enzymes noted on outpt labs       Problem/Plan - 1:  ·  Problem: Hepatocellular injury.   ·  Plan: - ddx includes DILI 2/2 Keytruda vs less likely Keppra as has been on for a year vs AI vs less likely ischemic   - No longer on Keytruda  - US unremarkable   - Order AI w/u  - MR Abdomen w/ IV   - acute viral hepatitis panel, plus HBV PCR, HCV PCR, HEV IgM/IgG  - ASMA, AMA, LKM  - trend LFTs Q8H  - full infectious workup  - UTox   follow up with oncology re: alternatives to Keytruda for further treatment; if no alternatives, can consider liver biopsy to confirm diagnosis; however, if alternatives available, then would defer bx as liver numbers are improving  - trend LFTs  -  follow up with oncology re: alternatives to Keytruda for further treatment; if no alternatives, can consider liver biopsy to confirm diagnosis; however, if alternatives available, then would defer bx as liver numbers are improving  - BCx NGTD  - plan as per hepatology     Problem/Plan - 2:  ·  Problem: Chronic atrial fibrillation.   ·  Plan: - On Eliquis 2.5mg BID, continue   - c/w Lopressor 50mg BID.     Problem/Plan - 3:  ·  Problem: Type 2 diabetes mellitus.   -  AM A1c 7.8  - SALAS for now  - CC diet  - Diabetic education.     Problem/Plan - 4:  ·  Problem: HTN (hypertension).   ·  Plan: - c/w home medications w/ hold parameters.     Problem/Plan - 5:  ·  Problem: HLD (hyperlipidemia).   ·  Plan: - No statin given elevated liver enzymes.     Problem/Plan - 6:  ·  Problem: CAD (coronary artery disease).   ·  Plan: - On Eliquis.     Problem/Plan - 7:  ·  Problem: Primary lung cancer with metastasis from lung to other site.   ·  Plan: - Holding Keytruda  - c/w Keppra for brain mets.     Problem/Plan - 8:  ·  Problem: Prophylactic measure.   ·  Plan: DVT ppx: On Eliquis  Diet: DASH/CC  Dispo: pending clinical improvement.   82yo F w/ PMH of HTN, HLD, DM2, CAD c/b MI, Afib on Eliquis, PAD, metastatic lung CA w/ known brain mets s/p craniectomy previously on Keytruda pw elevated liver enzymes noted on outpt labs       Problem/Plan - 1:  ·  Problem: Hepatocellular injury.   ·  Plan: - ddx includes DILI 2/2 Keytruda vs less likely Keppra as has been on for a year vs AI vs less likely ischemic   - No longer on Keytruda  - US unremarkable   - Order AI w/u  - MR Abdomen w/ IV   - acute viral hepatitis panel, plus HBV PCR, HCV PCR, HEV IgM/IgG  - ASMA, AMA, LKM  - trend LFTs Q8H  - full infectious workup  - UTox   follow up with oncology re: alternatives to Keytruda for further treatment; if no alternatives, can consider liver biopsy to confirm diagnosis; however, if alternatives available, then would defer bx as liver numbers are improving  - trend LFTs  -  follow up with oncology re: alternatives to Keytruda for further treatment; if no alternatives, can consider liver biopsy to confirm diagnosis; however, if alternatives available, then would defer bx as liver numbers are improving  - BCx NGTD  - plan as per hepatology     Problem/Plan - 2:  ·  Problem: Chronic atrial fibrillation.   ·  Plan: - On Eliquis 2.5mg BID, continue   - c/w Lopressor 50mg BID.     Problem/Plan - 3:  ·  Problem: Type 2 diabetes mellitus.   -  AM A1c 7.8  - SALAS for now  - CC diet  - Diabetic education.     Problem/Plan - 4:  ·  Problem: HTN (hypertension).   ·  Plan: - c/w home medications w/ hold parameters.     Problem/Plan - 5:  ·  Problem: HLD (hyperlipidemia).   ·  Plan: - No statin given elevated liver enzymes.     Problem/Plan - 6:  ·  Problem: CAD (coronary artery disease).   ·  Plan: - On Eliquis.     Problem/Plan - 7:  ·  Problem: Primary lung cancer with metastasis from lung to other site.   ·  Plan: - Holding Keytruda  - c/w Keppra for brain mets.     Problem/Plan - 8:  ·  Problem: Prophylactic measure.   ·  Plan: DVT ppx: On Eliquis  Diet: DASH/CC  Dispo: pending clinical improvement.

## 2023-12-13 NOTE — PROGRESS NOTE ADULT - SUBJECTIVE AND OBJECTIVE BOX
DATE OF SERVICE: 12-13-23 @ 12:46    Patient is a 83y old  Female who presents with a chief complaint of Elevated liver enzymes (13 Dec 2023 11:26)      SUBJECTIVE / OVERNIGHT EVENTS:  No chest pain. No shortness of breath. No complaints. No events overnight.     MEDICATIONS  (STANDING):  apixaban 2.5 milliGRAM(s) Oral every 12 hours  artificial tears (preservative free) Ophthalmic Solution 1 Drop(s) Both EYES three times a day  cholecalciferol 1000 Unit(s) Oral daily  dextrose 5%. 1000 milliLiter(s) (50 mL/Hr) IV Continuous <Continuous>  dextrose 5%. 1000 milliLiter(s) (100 mL/Hr) IV Continuous <Continuous>  dextrose 50% Injectable 12.5 Gram(s) IV Push once  dextrose 50% Injectable 25 Gram(s) IV Push once  dextrose 50% Injectable 25 Gram(s) IV Push once  gabapentin 300 milliGRAM(s) Oral at bedtime  gabapentin 100 milliGRAM(s) Oral daily  glucagon  Injectable 1 milliGRAM(s) IntraMuscular once  insulin lispro (ADMELOG) corrective regimen sliding scale   SubCutaneous at bedtime  insulin lispro (ADMELOG) corrective regimen sliding scale   SubCutaneous three times a day before meals  levETIRAcetam 1000 milliGRAM(s) Oral two times a day  metoprolol tartrate 50 milliGRAM(s) Oral two times a day  predniSONE   Tablet 40 milliGRAM(s) Oral daily  traMADol 50 milliGRAM(s) Oral three times a day    MEDICATIONS  (PRN):  dextrose Oral Gel 15 Gram(s) Oral once PRN Blood Glucose LESS THAN 70 milliGRAM(s)/deciliter      Vital Signs Last 24 Hrs  T(C): 36.6 (13 Dec 2023 09:58), Max: 36.7 (12 Dec 2023 13:00)  T(F): 97.9 (13 Dec 2023 09:58), Max: 98.1 (12 Dec 2023 20:35)  HR: 69 (13 Dec 2023 09:58) (61 - 75)  BP: 137/89 (13 Dec 2023 09:58) (111/62 - 137/89)  BP(mean): --  RR: 18 (13 Dec 2023 09:58) (18 - 18)  SpO2: 98% (13 Dec 2023 09:58) (95% - 99%)    Parameters below as of 13 Dec 2023 09:58  Patient On (Oxygen Delivery Method): room air      CAPILLARY BLOOD GLUCOSE      POCT Blood Glucose.: 291 mg/dL (13 Dec 2023 09:36)  POCT Blood Glucose.: 262 mg/dL (12 Dec 2023 21:05)  POCT Blood Glucose.: 239 mg/dL (12 Dec 2023 18:01)  POCT Blood Glucose.: 262 mg/dL (12 Dec 2023 13:10)    I&O's Summary    12 Dec 2023 07:01  -  13 Dec 2023 07:00  --------------------------------------------------------  IN: 300 mL / OUT: 750 mL / NET: -450 mL    13 Dec 2023 07:01  -  13 Dec 2023 12:46  --------------------------------------------------------  IN: 260 mL / OUT: 250 mL / NET: 10 mL        PHYSICAL EXAM:  GENERAL: NAD, well-developed  HEAD:  Atraumatic, Normocephalic  EYES: EOMI, PERRLA, conjunctiva and sclera clear  NECK: Supple, No JVD  CHEST/LUNG: Clear to auscultation bilaterally; No wheeze  HEART: Regular rate and rhythm; No murmurs, rubs, or gallops  ABDOMEN: Soft, Nontender, Nondistended; Bowel sounds present  EXTREMITIES:  2+ Peripheral Pulses, No clubbing, cyanosis, or edema  PSYCH: AAOx3  NEUROLOGY: non-focal  SKIN: No rashes or lesions    LABS:                        11.8   5.44  )-----------( 276      ( 13 Dec 2023 07:09 )             35.7     12-13    138  |  101  |  27<H>  ----------------------------<  237<H>  3.6   |  25  |  1.17    Ca    9.1      13 Dec 2023 07:09    TPro  5.8<L>  /  Alb  3.1<L>  /  TBili  4.2<H>  /  DBili  3.3<H>  /  AST  863<H>  /  ALT  645<H>  /  AlkPhos  933<H>  12-12          Urinalysis Basic - ( 13 Dec 2023 07:09 )    Color: x / Appearance: x / SG: x / pH: x  Gluc: 237 mg/dL / Ketone: x  / Bili: x / Urobili: x   Blood: x / Protein: x / Nitrite: x   Leuk Esterase: x / RBC: x / WBC x   Sq Epi: x / Non Sq Epi: x / Bacteria: x        RADIOLOGY & ADDITIONAL TESTS:    Imaging Personally Reviewed:    Consultant(s) Notes Reviewed:      Care Discussed with Consultants/Other Providers:

## 2023-12-13 NOTE — CONSULT NOTE ADULT - NS ATTEND AMEND GEN_ALL_CORE FT
Chart, labs, vitals, radiology reviewed. Above H&P reviewed and edited where appropriate. Agree with history and physical exam. Agree with assessment and plan. I reviewed the overnight course of events and discussed the care with the patient/ family.  All the decisions in assessment and plan are made by me

## 2023-12-13 NOTE — CONSULT NOTE ADULT - PROBLEM SELECTOR RECOMMENDATION 9
Will start Lantus 15 units at bed time.  Will start Admelog to 7 units before each meal in addition to Admelog correction scale coverage.  Will continue monitoring FS, log, and glucose trends, will Follow up.  Patient counseled for compliance with consistent low carb diet and exercise as tolerated outpatient.
HYPERMETRIA

## 2023-12-13 NOTE — PROGRESS NOTE ADULT - SUBJECTIVE AND OBJECTIVE BOX
INTERVAL HPI/OVERNIGHT EVENTS:  Patient S&E at bedside. No o/n events, she reports she feels well overall. Reports some urinary symptoms and bronze urine.    PAST MEDICAL & SURGICAL HISTORY:  Hypertension    Coronary artery disease    Hyperlipidemia    Peripheral artery disease    Myocardial infarct    Peripheral neuropathy    Diabetes    Chronic back pain    Former smoker    Non-small cell lung cancer    PAD (peripheral artery disease)    CAD (coronary artery disease)  stents      FAMILY HISTORY:  Family history of stroke (Sibling)      REVIEW OF SYSTEMS  General: No fevers, no chills, no fatigue, no weight loss  Skin: No rash  ENMT: No sore throat, no dysphagia, no mouth sores	  Respiratory and Thorax: No dyspnea, no cough, no wheezing  Cardiovascular: No chest pain, no palpitations  Gastrointestinal:	No N/V/D, no abdominal pain  Genitourinary: +bladder discomfort prior to urination, +"bronze urine"  Musculoskeletal:	No joint pain, no muscle pain  Neurological:	No dizziness, no neuropathy  Psychiatric: No depression or anxiety  Hematology/Lymphatics: No easy bleeding or bruising, no swollen nodes noticed.       VITAL SIGNS:  T(F): 98.3 (12-13-23 @ 14:03)  HR: 70 (12-13-23 @ 14:03)  BP: 133/70 (12-13-23 @ 14:03)  RR: 18 (12-13-23 @ 14:03)  SpO2: 97% (12-13-23 @ 14:03)  Wt(kg): --    PHYSICAL EXAM:    Constitutional: NAD  Eyes: EOMI, sclera non-icteric  Respiratory: nonlabored breathing  Gastrointestinal: nondistended  Extremities: no LE edema  Neurological: AAOx3      MEDICATIONS  (STANDING):  apixaban 2.5 milliGRAM(s) Oral every 12 hours  artificial tears (preservative free) Ophthalmic Solution 1 Drop(s) Both EYES three times a day  cholecalciferol 1000 Unit(s) Oral daily  dextrose 5%. 1000 milliLiter(s) (50 mL/Hr) IV Continuous <Continuous>  dextrose 5%. 1000 milliLiter(s) (100 mL/Hr) IV Continuous <Continuous>  dextrose 50% Injectable 12.5 Gram(s) IV Push once  dextrose 50% Injectable 25 Gram(s) IV Push once  dextrose 50% Injectable 25 Gram(s) IV Push once  gabapentin 300 milliGRAM(s) Oral at bedtime  gabapentin 100 milliGRAM(s) Oral daily  glucagon  Injectable 1 milliGRAM(s) IntraMuscular once  insulin glargine Injectable (LANTUS) 15 Unit(s) SubCutaneous at bedtime  insulin lispro (ADMELOG) corrective regimen sliding scale   SubCutaneous three times a day before meals  insulin lispro (ADMELOG) corrective regimen sliding scale   SubCutaneous at bedtime  insulin lispro Injectable (ADMELOG) 7 Unit(s) SubCutaneous three times a day before meals  levETIRAcetam 1000 milliGRAM(s) Oral two times a day  metoprolol tartrate 50 milliGRAM(s) Oral two times a day  predniSONE   Tablet 40 milliGRAM(s) Oral daily  traMADol 50 milliGRAM(s) Oral three times a day    MEDICATIONS  (PRN):  dextrose Oral Gel 15 Gram(s) Oral once PRN Blood Glucose LESS THAN 70 milliGRAM(s)/deciliter      Allergies    No Known Drug Allergies  adhesives (Pruritus)    Intolerances        LABS:                        11.8   5.44  )-----------( 276      ( 13 Dec 2023 07:09 )             35.7     12-13    138  |  101  |  27<H>  ----------------------------<  237<H>  3.6   |  25  |  1.17    Ca    9.1      13 Dec 2023 07:09    TPro  5.9<L>  /  Alb  3.4  /  TBili  4.3<H>  /  DBili  3.2<H>  /  AST  901<H>  /  ALT  667<H>  /  AlkPhos  963<H>  12-13      Urinalysis Basic - ( 13 Dec 2023 07:09 )    Color: x / Appearance: x / SG: x / pH: x  Gluc: 237 mg/dL / Ketone: x  / Bili: x / Urobili: x   Blood: x / Protein: x / Nitrite: x   Leuk Esterase: x / RBC: x / WBC x   Sq Epi: x / Non Sq Epi: x / Bacteria: x        RADIOLOGY & ADDITIONAL TESTS:  Studies reviewed.    < from: MR Abdomen w/wo IV Cont (12.11.23 @ 11:41) >  IMPRESSION:  *  Mild periportal edema. Otherwise unremarkable appearance of the liver.  *  No biliary duct dilation.  *  Mild nonspecific edematous gallbladder wall thickening, potentially   reactive to the liver. Consider correlation with right upper quadrant   ultrasound as warranted.  *  Nonspecific periportal lymphadenopathy.        --- End of Report ---    < end of copied text >

## 2023-12-13 NOTE — PROGRESS NOTE ADULT - ASSESSMENT
ECHO 10/3/22: EF 40-45%. Akinesis of the inferior with hypokinesis of the inferolateral, septum and anterolateral hypokinetic.    A/p  83y  woman with a PMHx significant for HTN, DM, HLD, CAD, MI, PAD, peripheral neuropathy, former smoker, NSCLC with ANTHONY primary tumor and brain metastasis s/p right craniotomy & resection on 7/27/22 and post-op RT on active treatment, STEMI (10/2022), afib on eliquis, hx of RML PE presenting with elevated lfts     #elevated lfts   -work up per hepatology  -? med induced   -hepatology eval noted  -LFTs improving   -CT abd/pelv noted   -MR abdomen noted   -Per heme/onc may require liver bx - if needed, remains cv stable to proceed - apixaban will need to be held    #Paroxysmal atrial fibrillation  -cont apixaban   -cont metoprolol    #hypertension  -stable  -cont bb    #Hx NSTEMI, CAD, s/p PCI  -STEMI in 8/2022 treated medically at the time in setting of recent craniotomy  -No chest pain or sob  -no statins given lfts  -Echo with nml lv fxn, mild MR   -Recommend starting asa if no c/i from neuro/neurosx    #Metast. Lung cancer to brain.   -s/p right craniotomy for resection of brain mass 7/27/22  -Heme/onc eval noted     #history of PE - dx in AUG 2022   -cont ac

## 2023-12-13 NOTE — PROGRESS NOTE ADULT - SUBJECTIVE AND OBJECTIVE BOX
CARDIOLOGY FOLLOW UP - Dr. Alcaraz  DATE OF SERVICE: 12/13/23    CC  No CV complaints     REVIEW OF SYSTEMS:  CONSTITUTIONAL: No fever, weight loss, or fatigue  RESPIRATORY: No cough, wheezing, chills or hemoptysis; No Shortness of Breath  CARDIOVASCULAR: No chest pain, palpitations, passing out, dizziness, or leg swelling  GASTROINTESTINAL: No abdominal or epigastric pain. No nausea, vomiting, or hematemesis; No diarrhea or constipation. No melena or hematochezia.  VASCULAR: No edema     PHYSICAL EXAM:  T(C): 36.6 (12-13-23 @ 09:58), Max: 36.7 (12-12-23 @ 13:00)  HR: 69 (12-13-23 @ 09:58) (61 - 75)  BP: 137/89 (12-13-23 @ 09:58) (111/62 - 137/89)  RR: 18 (12-13-23 @ 09:58) (18 - 18)  SpO2: 98% (12-13-23 @ 09:58) (95% - 99%)  Wt(kg): --  I&O's Summary    12 Dec 2023 07:01  -  13 Dec 2023 07:00  --------------------------------------------------------  IN: 300 mL / OUT: 750 mL / NET: -450 mL    13 Dec 2023 07:01  -  13 Dec 2023 11:27  --------------------------------------------------------  IN: 260 mL / OUT: 250 mL / NET: 10 mL        Appearance: Normal	  Cardiovascular: Normal S1 S2,RRR, No JVD, No murmurs  Respiratory: Lungs clear to auscultation b/l   Gastrointestinal:  Soft, Non-tender, + BS	  Extremities: Normal range of motion, No clubbing, cyanosis or edema      Home Medications:  gabapentin 100 mg oral capsule: 1 cap(s) orally once a day (in the morning) (09 Dec 2023 15:06)  gabapentin 300 mg oral capsule: 1 cap(s) orally once a day (in the evening) (09 Dec 2023 15:06)  metFORMIN 1000 mg oral tablet: 1 tab(s) orally 2 times a day (11 Dec 2023 10:23)  metoprolol tartrate 50 mg oral tablet: 1 tab(s) orally 2 times a day note: rx has 3 times per day. (11 Dec 2023 10:23)  Ozempic 2 mg/1.5 mL (0.25 mg or 0.5 mg dose) subcutaneous solution: 0.25 milligram(s) subcutaneously once a week on tuesday (11 Dec 2023 10:23)  traMADol 50 mg oral tablet: 1 tab(s) orally 3 times a day (09 Dec 2023 15:04)  Tresiba FlexTouch 100 units/mL subcutaneous solution: 14 unit(s) subcutaneous once a day (11 Dec 2023 10:23)  Vitamin B-12: 1 tab once a day (09 Dec 2023 15:04)  Vitamin D3 25 mcg (1000 intl units) oral tablet: 1 tab(s) orally once a day (09 Dec 2023 15:04)      MEDICATIONS  (STANDING):  apixaban 2.5 milliGRAM(s) Oral every 12 hours  artificial tears (preservative free) Ophthalmic Solution 1 Drop(s) Both EYES three times a day  cholecalciferol 1000 Unit(s) Oral daily  dextrose 5%. 1000 milliLiter(s) (50 mL/Hr) IV Continuous <Continuous>  dextrose 5%. 1000 milliLiter(s) (100 mL/Hr) IV Continuous <Continuous>  dextrose 50% Injectable 12.5 Gram(s) IV Push once  dextrose 50% Injectable 25 Gram(s) IV Push once  dextrose 50% Injectable 25 Gram(s) IV Push once  gabapentin 300 milliGRAM(s) Oral at bedtime  gabapentin 100 milliGRAM(s) Oral daily  glucagon  Injectable 1 milliGRAM(s) IntraMuscular once  insulin lispro (ADMELOG) corrective regimen sliding scale   SubCutaneous three times a day before meals  insulin lispro (ADMELOG) corrective regimen sliding scale   SubCutaneous at bedtime  levETIRAcetam 1000 milliGRAM(s) Oral two times a day  metoprolol tartrate 50 milliGRAM(s) Oral two times a day  predniSONE   Tablet 40 milliGRAM(s) Oral daily  traMADol 50 milliGRAM(s) Oral three times a day      TELEMETRY: 	    ECG:  	  RADIOLOGY:   DIAGNOSTIC TESTING:  [ ] Echocardiogram:  [ ]  Catheterization:  [ ] Stress Test:    OTHER: 	    LABS:	 	                            11.8   5.44  )-----------( 276      ( 13 Dec 2023 07:09 )             35.7     12-13    138  |  101  |  27<H>  ----------------------------<  237<H>  3.6   |  25  |  1.17    Ca    9.1      13 Dec 2023 07:09    TPro  5.8<L>  /  Alb  3.1<L>  /  TBili  4.2<H>  /  DBili  3.3<H>  /  AST  863<H>  /  ALT  645<H>  /  AlkPhos  933<H>  12-12

## 2023-12-13 NOTE — PROGRESS NOTE ADULT - ASSESSMENT
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 Oncology Consulted for continuity of care.    #NSCLC  - Pt follows Dr. Sudhakar Ramon at [facility]. She was initially diagnosed with Stage IVB metastatic lung cancer (PDL1 negative, TMB - high) s/p craniotomy on 7/2022 and SRS to the temporal cavity and two metastatic lesions currently on single agent Pembrolizumab since October 2022.  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 Otherwise unremarkable appearance of the liver. No biliary duct dilation.  Mild nonspecific edematous gallbladder wall thickening, potentially reactive to the liver. Nonspecific periportal lymphadenopathy.  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 Plan to monitor off of treatment until follow up Oncology appointment.  - Given higher suspicion for DILI, if there are no alternate etiologies suspected from hepatology standpoint then okay to defer liver biopsy at this time  - Upon discharge will need close follow up with Dr. Ramon at [facility] for ongoing management     Case discussed w/ Dr. 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 Oncology Consulted for continuity of care.    #NSCLC  - Pt follows Dr. Sudhakar Ramon at [facility]. She was initially diagnosed with Stage IVB metastatic lung cancer (PDL1 negative, TMB - high) s/p craniotomy on 7/2022 and SRS to the temporal cavity and two metastatic lesions currently on single agent Pembrolizumab since October 2022.  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 Otherwise unremarkable appearance of the liver. No biliary duct dilation.  Mild nonspecific edematous gallbladder wall thickening, potentially reactive to the liver. Nonspecific periportal lymphadenopathy.  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 Plan to monitor off of treatment until follow up Oncology appointment.  - Given higher suspicion for DILI, if there are no alternate etiologies suspected from hepatology standpoint then okay to defer liver biopsy at this time  - Upon discharge will need close follow up with Dr. Ramon at [facility] for ongoing management     Case discussed w/ Dr. Scott Lee, PGY-4  Fellow Hematology/Oncology  pager [phone]  Available on TEAMS  After 5pm or on weekends please contact  to page on-call fellow

## 2023-12-13 NOTE — CONSULT NOTE ADULT - SUBJECTIVE AND OBJECTIVE BOX
HPI:  Pt is an 82yo F w/ PMH of HTN, HLD, DM2, CAD c/b MI, Afib on Eliquis, PAD, metastatic lung CA w/ known brain mets s/p craniectomy previously on Keytruda pw elevated liver enzymes noted on outpt labs.     Only symptoms she endorses are feeling weak w/ decreased appetite x1 yr since craniectomy. Went to endo visit about 2-3 weeks ago and was noted to have increased liver enzymes which continued to worsen over that time period, leading ot her presentation to [facility].     Here she denies any HA, dizziness, CP, SOB, abd pain, N/V. Does endorse pruritis that's unchanged. In the ED VSS w/ labs as below w/ elevated liver enzymes. (09 Dec 2023 21:48)      Patient has history of diabetes, A1C 7.8 % on home insulin + ozempic    Endo was consulted for glycemic control.      PAST MEDICAL & SURGICAL HISTORY:  Hypertension      Coronary artery disease      Hyperlipidemia      Peripheral artery disease      Myocardial infarct      Peripheral neuropathy      Diabetes      Chronic back pain      Former smoker      Non-small cell lung cancer      PAD (peripheral artery disease)      CAD (coronary artery disease)  stents          FAMILY HISTORY:  Family history of stroke (Sibling)        Social History:  - Former tobacco use  - Denies EtOH consumption  - Denies illicit drug use (09 Dec 2023 21:48)            HOME MEDICATIONS:  Home Medications:  gabapentin 100 mg oral capsule: 1 cap(s) orally once a day (in the morning) (09 Dec 2023 15:06)  gabapentin 300 mg oral capsule: 1 cap(s) orally once a day (in the evening) (09 Dec 2023 15:06)  metFORMIN 1000 mg oral tablet: 1 tab(s) orally 2 times a day (11 Dec 2023 10:23)  metoprolol tartrate 50 mg oral tablet: 1 tab(s) orally 2 times a day note: rx has 3 times per day. (11 Dec 2023 10:23)  Ozempic 2 mg/1.5 mL (0.25 mg or 0.5 mg dose) subcutaneous solution: 0.25 milligram(s) subcutaneously once a week on tuesday (11 Dec 2023 10:23)  traMADol 50 mg oral tablet: 1 tab(s) orally 3 times a day (09 Dec 2023 15:04)  Tresiba FlexTouch 100 units/mL subcutaneous solution: 14 unit(s) subcutaneous once a day (11 Dec 2023 10:23)  Vitamin B-12: 1 tab once a day (09 Dec 2023 15:04)  Vitamin D3 25 mcg (1000 intl units) oral tablet: 1 tab(s) orally once a day (09 Dec 2023 15:04)            MEDICATIONS  (STANDING):  apixaban 2.5 milliGRAM(s) Oral every 12 hours  artificial tears (preservative free) Ophthalmic Solution 1 Drop(s) Both EYES three times a day  cholecalciferol 1000 Unit(s) Oral daily  dextrose 5%. 1000 milliLiter(s) (100 mL/Hr) IV Continuous <Continuous>  dextrose 5%. 1000 milliLiter(s) (50 mL/Hr) IV Continuous <Continuous>  dextrose 50% Injectable 12.5 Gram(s) IV Push once  dextrose 50% Injectable 25 Gram(s) IV Push once  dextrose 50% Injectable 25 Gram(s) IV Push once  gabapentin 300 milliGRAM(s) Oral at bedtime  gabapentin 100 milliGRAM(s) Oral daily  glucagon  Injectable 1 milliGRAM(s) IntraMuscular once  insulin glargine Injectable (LANTUS) 15 Unit(s) SubCutaneous at bedtime  insulin lispro (ADMELOG) corrective regimen sliding scale   SubCutaneous three times a day before meals  insulin lispro (ADMELOG) corrective regimen sliding scale   SubCutaneous at bedtime  insulin lispro Injectable (ADMELOG) 7 Unit(s) SubCutaneous three times a day before meals  levETIRAcetam 1000 milliGRAM(s) Oral two times a day  metoprolol tartrate 50 milliGRAM(s) Oral two times a day  predniSONE   Tablet 40 milliGRAM(s) Oral daily  traMADol 50 milliGRAM(s) Oral three times a day    MEDICATIONS  (PRN):  dextrose Oral Gel 15 Gram(s) Oral once PRN Blood Glucose LESS THAN 70 milliGRAM(s)/deciliter      Allergies    No Known Drug Allergies  adhesives (Pruritus)    Intolerances        Review of Systems:  Neuro: No HA, no dizziness  Cardiovascular: No chest pain, no palpitations  Respiratory: no SOB, no cough  GI: No nausea, vomiting, abdominal pain  MSK: Denies joint/muscle pain      ALL OTHER SYSTEMS REVIEWED AND NEGATIVE      PHYSICAL EXAM:  VITALS: T(C): 36.8 (12-13-23 @ 14:03)  T(F): 98.3 (12-13-23 @ 14:03), Max: 98.3 (12-13-23 @ 14:03)  HR: 70 (12-13-23 @ 14:03) (61 - 70)  BP: 133/70 (12-13-23 @ 14:03) (118/79 - 137/89)  RR:  (18 - 18)  SpO2:  (96% - 99%)  Wt(kg): --  GENERAL: NAD, well-groomed, well-developed  NEURO:  alert and oriented  RESPIRATORY: Clear to auscultation bilaterally; No rales, rhonchi, wheezing  CARDIOVASCULAR: Si S2  GI: Soft, non distended, normal bowel sounds  MUSCULOSKELETAL: Moves all extremities equally       POCT Blood Glucose.: 408 mg/dL (12-13-23 @ 13:58)  POCT Blood Glucose.: 408 mg/dL (12-13-23 @ 13:55)  POCT Blood Glucose.: 291 mg/dL (12-13-23 @ 09:36)  POCT Blood Glucose.: 262 mg/dL (12-12-23 @ 21:05)  POCT Blood Glucose.: 239 mg/dL (12-12-23 @ 18:01)  POCT Blood Glucose.: 262 mg/dL (12-12-23 @ 13:10)  POCT Blood Glucose.: 231 mg/dL (12-12-23 @ 09:00)  POCT Blood Glucose.: 328 mg/dL (12-11-23 @ 21:28)  POCT Blood Glucose.: 300 mg/dL (12-11-23 @ 17:25)  POCT Blood Glucose.: 272 mg/dL (12-11-23 @ 13:04)  POCT Blood Glucose.: 250 mg/dL (12-11-23 @ 08:49)  POCT Blood Glucose.: 253 mg/dL (12-10-23 @ 21:28)  POCT Blood Glucose.: 149 mg/dL (12-10-23 @ 17:30)                            11.8   5.44  )-----------( 276      ( 13 Dec 2023 07:09 )             35.7       12-13    138  |  101  |  27<H>  ----------------------------<  237<H>  3.6   |  25  |  1.17    eGFR: 46<L>    Ca    9.1      12-13  Mg     2.1     12-10    TPro  5.9<L>  /  Alb  3.4  /  TBili  4.3<H>  /  DBili  3.2<H>  /  AST  901<H>  /  ALT  667<H>  /  AlkPhos  963<H>  12-13      Thyroid Function Tests:    Diet, DASH/TLC:   Sodium & Cholesterol Restricted  Consistent Carbohydrate No Snacks (CSTCHO) (12-09-23 @ 20:29) [Active]          A1C with Estimated Average Glucose Result: 7.8 % (12-10-23 @ 07:29)  A1C with Estimated Average Glucose Result: 8.1 % (07-18-23 @ 05:28)                     HPI:  Pt is an 84yo F w/ PMH of HTN, HLD, DM2, CAD c/b MI, Afib on Eliquis, PAD, metastatic lung CA w/ known brain mets s/p craniectomy previously on Keytruda pw elevated liver enzymes noted on outpt labs.     Only symptoms she endorses are feeling weak w/ decreased appetite x1 yr since craniectomy. Went to endo visit about 2-3 weeks ago and was noted to have increased liver  enzymes which continued to worsen over that time period, leading ot her presentation to [facility].     Here she denies any HA, dizziness, CP, SOB, abd pain, N/V. Does endorse pruritis that's unchanged. In the ED VSS w/ labs as below w/ elevated liver enzymes. (09 Dec 2023 21:48)      Patient has history of diabetes, A1C 7.8 % on home insulin + ozempic    Endo was consulted for glycemic control.      PAST MEDICAL & SURGICAL HISTORY:  Hypertension      Coronary artery disease      Hyperlipidemia      Peripheral artery disease      Myocardial infarct      Peripheral neuropathy      Diabetes      Chronic back pain      Former smoker      Non-small cell lung cancer      PAD (peripheral artery disease)      CAD (coronary artery disease)  stents          FAMILY HISTORY:  Family history of stroke (Sibling)        Social History:  - Former tobacco use  - Denies EtOH consumption  - Denies illicit drug use (09 Dec 2023 21:48)            HOME MEDICATIONS:  Home Medications:  gabapentin 100 mg oral capsule: 1 cap(s) orally once a day (in the morning) (09 Dec 2023 15:06)  gabapentin 300 mg oral capsule: 1 cap(s) orally once a day (in the evening) (09 Dec 2023 15:06)  metFORMIN 1000 mg oral tablet: 1 tab(s) orally 2 times a day (11 Dec 2023 10:23)  metoprolol tartrate 50 mg oral tablet: 1 tab(s) orally 2 times a day note: rx has 3 times per day. (11 Dec 2023 10:23)  Ozempic 2 mg/1.5 mL (0.25 mg or 0.5 mg dose) subcutaneous solution: 0.25 milligram(s) subcutaneously once a week on tuesday (11 Dec 2023 10:23)  traMADol 50 mg oral tablet: 1 tab(s) orally 3 times a day (09 Dec 2023 15:04)  Tresiba FlexTouch 100 units/mL subcutaneous solution: 14 unit(s) subcutaneous once a day (11 Dec 2023 10:23)  Vitamin B-12: 1 tab once a day (09 Dec 2023 15:04)  Vitamin D3 25 mcg (1000 intl units) oral tablet: 1 tab(s) orally once a day (09 Dec 2023 15:04)            MEDICATIONS  (STANDING):  apixaban 2.5 milliGRAM(s) Oral every 12 hours  artificial tears (preservative free) Ophthalmic Solution 1 Drop(s) Both EYES three times a day  cholecalciferol 1000 Unit(s) Oral daily  dextrose 5%. 1000 milliLiter(s) (100 mL/Hr) IV Continuous <Continuous>  dextrose 5%. 1000 milliLiter(s) (50 mL/Hr) IV Continuous <Continuous>  dextrose 50% Injectable 12.5 Gram(s) IV Push once  dextrose 50% Injectable 25 Gram(s) IV Push once  dextrose 50% Injectable 25 Gram(s) IV Push once  gabapentin 300 milliGRAM(s) Oral at bedtime  gabapentin 100 milliGRAM(s) Oral daily  glucagon  Injectable 1 milliGRAM(s) IntraMuscular once  insulin glargine Injectable (LANTUS) 15 Unit(s) SubCutaneous at bedtime  insulin lispro (ADMELOG) corrective regimen sliding scale   SubCutaneous three times a day before meals  insulin lispro (ADMELOG) corrective regimen sliding scale   SubCutaneous at bedtime  insulin lispro Injectable (ADMELOG) 7 Unit(s) SubCutaneous three times a day before meals  levETIRAcetam 1000 milliGRAM(s) Oral two times a day  metoprolol tartrate 50 milliGRAM(s) Oral two times a day  predniSONE   Tablet 40 milliGRAM(s) Oral daily  traMADol 50 milliGRAM(s) Oral three times a day    MEDICATIONS  (PRN):  dextrose Oral Gel 15 Gram(s) Oral once PRN Blood Glucose LESS THAN 70 milliGRAM(s)/deciliter      Allergies    No Known Drug Allergies  adhesives (Pruritus)    Intolerances        Review of Systems:  Neuro: No HA, no dizziness  Cardiovascular: No chest pain, no palpitations  Respiratory: no SOB, no cough  GI: No nausea, vomiting, abdominal pain  MSK: Denies joint/muscle pain      ALL OTHER SYSTEMS REVIEWED AND NEGATIVE      PHYSICAL EXAM:  VITALS: T(C): 36.8 (12-13-23 @ 14:03)  T(F): 98.3 (12-13-23 @ 14:03), Max: 98.3 (12-13-23 @ 14:03)  HR: 70 (12-13-23 @ 14:03) (61 - 70)  BP: 133/70 (12-13-23 @ 14:03) (118/79 - 137/89)  RR:  (18 - 18)  SpO2:  (96% - 99%)  Wt(kg): --  GENERAL: NAD, well-groomed, well-developed  NEURO:  alert and oriented  RESPIRATORY: Clear to auscultation bilaterally; No rales, rhonchi, wheezing  CARDIOVASCULAR: Si S2  GI: Soft, non distended, normal bowel sounds  MUSCULOSKELETAL: Moves all extremities equally       POCT Blood Glucose.: 408 mg/dL (12-13-23 @ 13:58)  POCT Blood Glucose.: 408 mg/dL (12-13-23 @ 13:55)  POCT Blood Glucose.: 291 mg/dL (12-13-23 @ 09:36)  POCT Blood Glucose.: 262 mg/dL (12-12-23 @ 21:05)  POCT Blood Glucose.: 239 mg/dL (12-12-23 @ 18:01)  POCT Blood Glucose.: 262 mg/dL (12-12-23 @ 13:10)  POCT Blood Glucose.: 231 mg/dL (12-12-23 @ 09:00)  POCT Blood Glucose.: 328 mg/dL (12-11-23 @ 21:28)  POCT Blood Glucose.: 300 mg/dL (12-11-23 @ 17:25)  POCT Blood Glucose.: 272 mg/dL (12-11-23 @ 13:04)  POCT Blood Glucose.: 250 mg/dL (12-11-23 @ 08:49)  POCT Blood Glucose.: 253 mg/dL (12-10-23 @ 21:28)  POCT Blood Glucose.: 149 mg/dL (12-10-23 @ 17:30)                            11.8   5.44  )-----------( 276      ( 13 Dec 2023 07:09 )             35.7       12-13    138  |  101  |  27<H>  ----------------------------<  237<H>  3.6   |  25  |  1.17    eGFR: 46<L>    Ca    9.1      12-13  Mg     2.1     12-10    TPro  5.9<L>  /  Alb  3.4  /  TBili  4.3<H>  /  DBili  3.2<H>  /  AST  901<H>  /  ALT  667<H>  /  AlkPhos  963<H>  12-13      Thyroid Function Tests:    Diet, DASH/TLC:   Sodium & Cholesterol Restricted  Consistent Carbohydrate No Snacks (CSTCHO) (12-09-23 @ 20:29) [Active]          A1C with Estimated Average Glucose Result: 7.8 % (12-10-23 @ 07:29)  A1C with Estimated Average Glucose Result: 8.1 % (07-18-23 @ 05:28)                     HPI:  Pt is an 84yo F w/ PMH of HTN, HLD, DM2, CAD c/b MI, Afib on Eliquis, PAD, metastatic lung CA w/ known brain mets s/p craniectomy previously on Keytruda pw elevated liver enzymes noted on outpt labs.     Only symptoms she endorses are feeling weak w/ decreased appetite x1 yr since craniectomy. Went to endo visit about 2-3 weeks ago and was noted to have increased liver  enzymes which continued to worsen over that time period, leading ot her presentation to [facility].     Here she denies any HA, dizziness, CP, SOB, abd pain, N/V. Does endorse pruritis that's unchanged. In the ED VSS w/ labs as below w/ elevated liver enzymes. (09 Dec 2023 21:48)      Patient has history of diabetes, A1C 7.8 % on home insulin + ozempic    Endo was consulted for glycemic control.      PAST MEDICAL & SURGICAL HISTORY:  Hypertension      Coronary artery disease      Hyperlipidemia      Peripheral artery disease      Myocardial infarct      Peripheral neuropathy      Diabetes      Chronic back pain      Former smoker      Non-small cell lung cancer      PAD (peripheral artery disease)      CAD (coronary artery disease)  stents          FAMILY HISTORY:  Family history of stroke (Sibling)        Social History:  - Former tobacco use  - Denies EtOH consumption  - Denies illicit drug use (09 Dec 2023 21:48)            HOME MEDICATIONS:  Home Medications:  gabapentin 100 mg oral capsule: 1 cap(s) orally once a day (in the morning) (09 Dec 2023 15:06)  gabapentin 300 mg oral capsule: 1 cap(s) orally once a day (in the evening) (09 Dec 2023 15:06)  metFORMIN 1000 mg oral tablet: 1 tab(s) orally 2 times a day (11 Dec 2023 10:23)  metoprolol tartrate 50 mg oral tablet: 1 tab(s) orally 2 times a day note: rx has 3 times per day. (11 Dec 2023 10:23)  Ozempic 2 mg/1.5 mL (0.25 mg or 0.5 mg dose) subcutaneous solution: 0.25 milligram(s) subcutaneously once a week on tuesday (11 Dec 2023 10:23)  traMADol 50 mg oral tablet: 1 tab(s) orally 3 times a day (09 Dec 2023 15:04)  Tresiba FlexTouch 100 units/mL subcutaneous solution: 14 unit(s) subcutaneous once a day (11 Dec 2023 10:23)  Vitamin B-12: 1 tab once a day (09 Dec 2023 15:04)  Vitamin D3 25 mcg (1000 intl units) oral tablet: 1 tab(s) orally once a day (09 Dec 2023 15:04)            MEDICATIONS  (STANDING):  apixaban 2.5 milliGRAM(s) Oral every 12 hours  artificial tears (preservative free) Ophthalmic Solution 1 Drop(s) Both EYES three times a day  cholecalciferol 1000 Unit(s) Oral daily  dextrose 5%. 1000 milliLiter(s) (100 mL/Hr) IV Continuous <Continuous>  dextrose 5%. 1000 milliLiter(s) (50 mL/Hr) IV Continuous <Continuous>  dextrose 50% Injectable 12.5 Gram(s) IV Push once  dextrose 50% Injectable 25 Gram(s) IV Push once  dextrose 50% Injectable 25 Gram(s) IV Push once  gabapentin 300 milliGRAM(s) Oral at bedtime  gabapentin 100 milliGRAM(s) Oral daily  glucagon  Injectable 1 milliGRAM(s) IntraMuscular once  insulin glargine Injectable (LANTUS) 15 Unit(s) SubCutaneous at bedtime  insulin lispro (ADMELOG) corrective regimen sliding scale   SubCutaneous three times a day before meals  insulin lispro (ADMELOG) corrective regimen sliding scale   SubCutaneous at bedtime  insulin lispro Injectable (ADMELOG) 7 Unit(s) SubCutaneous three times a day before meals  levETIRAcetam 1000 milliGRAM(s) Oral two times a day  metoprolol tartrate 50 milliGRAM(s) Oral two times a day  predniSONE   Tablet 40 milliGRAM(s) Oral daily  traMADol 50 milliGRAM(s) Oral three times a day    MEDICATIONS  (PRN):  dextrose Oral Gel 15 Gram(s) Oral once PRN Blood Glucose LESS THAN 70 milliGRAM(s)/deciliter      Allergies    No Known Drug Allergies  adhesives (Pruritus)    Intolerances        Review of Systems:  Neuro: No HA, no dizziness  Cardiovascular: No chest pain, no palpitations  Respiratory: no SOB, no cough  GI: No nausea, vomiting, abdominal pain  MSK: Denies joint/muscle pain      ALL OTHER SYSTEMS REVIEWED AND NEGATIVE      PHYSICAL EXAM:  VITALS: T(C): 36.8 (12-13-23 @ 14:03)  T(F): 98.3 (12-13-23 @ 14:03), Max: 98.3 (12-13-23 @ 14:03)  HR: 70 (12-13-23 @ 14:03) (61 - 70)  BP: 133/70 (12-13-23 @ 14:03) (118/79 - 137/89)  RR:  (18 - 18)  SpO2:  (96% - 99%)  Wt(kg): --  GENERAL: NAD, well-groomed, well-developed  NEURO:  alert and oriented  RESPIRATORY: Clear to auscultation bilaterally; No rales, rhonchi, wheezing  CARDIOVASCULAR: Si S2  GI: Soft, non distended, normal bowel sounds  MUSCULOSKELETAL: Moves all extremities equally       POCT Blood Glucose.: 408 mg/dL (12-13-23 @ 13:58)  POCT Blood Glucose.: 408 mg/dL (12-13-23 @ 13:55)  POCT Blood Glucose.: 291 mg/dL (12-13-23 @ 09:36)  POCT Blood Glucose.: 262 mg/dL (12-12-23 @ 21:05)  POCT Blood Glucose.: 239 mg/dL (12-12-23 @ 18:01)  POCT Blood Glucose.: 262 mg/dL (12-12-23 @ 13:10)  POCT Blood Glucose.: 231 mg/dL (12-12-23 @ 09:00)  POCT Blood Glucose.: 328 mg/dL (12-11-23 @ 21:28)  POCT Blood Glucose.: 300 mg/dL (12-11-23 @ 17:25)  POCT Blood Glucose.: 272 mg/dL (12-11-23 @ 13:04)  POCT Blood Glucose.: 250 mg/dL (12-11-23 @ 08:49)  POCT Blood Glucose.: 253 mg/dL (12-10-23 @ 21:28)  POCT Blood Glucose.: 149 mg/dL (12-10-23 @ 17:30)                            11.8   5.44  )-----------( 276      ( 13 Dec 2023 07:09 )             35.7       12-13    138  |  101  |  27<H>  ----------------------------<  237<H>  3.6   |  25  |  1.17    eGFR: 46<L>    Ca    9.1      12-13  Mg     2.1     12-10    TPro  5.9<L>  /  Alb  3.4  /  TBili  4.3<H>  /  DBili  3.2<H>  /  AST  901<H>  /  ALT  667<H>  /  AlkPhos  963<H>  12-13      Thyroid Function Tests:    Diet, DASH/TLC:   Sodium & Cholesterol Restricted  Consistent Carbohydrate No Snacks (CSTCHO) (12-09-23 @ 20:29) [Active]          A1C with Estimated Average Glucose Result: 7.8 % (12-10-23 @ 07:29)  A1C with Estimated Average Glucose Result: 8.1 % (07-18-23 @ 05:28)

## 2023-12-13 NOTE — CONSULT NOTE ADULT - ASSESSMENT
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 Previously seen by our service. Endocrine consulted for hyperglycemias  Assessment  DMT2: 83y Female with DM T2 with hyperglycemia, A1C 7.8%, was on oral meds and insulin at home, now on basal bolus insulin with coverage, blood sugars running high. On prednisone PO, having steroid induced hyperglycemias.   HTN: on antihypertensive medications, monitored, asymptomatic.  Obesity: No strict exercise routines, not on any weight loss program, neither on low calorie diet. On Home GLP1-a        Discussed plan and management wit Dr La Ivey NP-TEAMS  Marsha Tovar MD  Cell: [phone]  Office: [phone]               82yo F w/ PMH of HTN, HLD, DM2, CAD c/b MI, Afib on Eliquis, PAD, metastatic lung CA w/ known brain mets s/p craniectomy previously on Keytruda pw elevated liver enzymes. Previously seen by our service. Endocrine consulted for hyperglycemias  Assessment  DMT2: 83y Female with DM T2 with hyperglycemia, A1C 7.8%, was on oral meds and insulin at home, now on basal bolus insulin with coverage, blood sugars running high. On prednisone PO, having steroid induced hyperglycemias.   HTN: on antihypertensive medications, monitored, asymptomatic.  Obesity: No strict exercise routines, not on any weight loss program, neither on low calorie diet. On Home GLP1-a        Discussed plan and management wit Dr La Ivey NP-TEAMS  Marsha Tovar MD  Cell: [phone]  Office: [phone]               82yo F w/ PMH of HTN, HLD, DM2, CAD c/b MI, Afib on Eliquis, PAD, metastatic lung CA w/ known brain mets s/p craniectomy previously on Keytruda pw elevated liver enzymes. Previously seen by our service. Endocrine consulted for hyperglycemias  Assessment  DMT2: 83y Female with DM T2 with hyperglycemia, A1C 7.8%, was on oral meds and insulin at home, now on basal bolus insulin with coverage, blood sugars running high. On  prednisone PO, having steroid induced hyperglycemias.   HTN: on antihypertensive medications, monitored, asymptomatic.  Obesity: No strict exercise routines, not on any weight loss program, neither on low calorie diet. On Home GLP1-a        Discussed plan and management wit Dr La Ivey NP-TEAMS  Marsha Tovar MD  Cell: [phone]  Office: [phone]               82yo F w/ PMH of HTN, HLD, DM2, CAD c/b MI, Afib on Eliquis, PAD, metastatic lung CA w/ known brain mets s/p craniectomy previously on Keytruda pw elevated liver enzymes. Previously seen by our service. Endocrine consulted for hyperglycemias  Assessment  DMT2: 83y Female with DM T2 with hyperglycemia, A1C 7.8%, was on oral meds and insulin at home, now on basal bolus insulin with coverage, blood sugars running high. On  prednisone PO, having steroid induced hyperglycemias.   HTN: on antihypertensive medications, monitored, asymptomatic.  Obesity: No strict exercise routines, not on any weight loss program, neither on low calorie diet. On Home GLP1-a        Discussed plan and management wit Dr La Ivey NP-TEAMS  Marsha Tovar MD  Cell: [phone]  Office: [phone]

## 2023-12-13 NOTE — PROGRESS NOTE ADULT - ATTENDING COMMENTS
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 LFT issue is likely 2/2 pembro and is slowly improving. She is on steroids. Agree liver bx can be held off. 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 LFT issue is likely 2/2 pembro and is slowly improving. She is on steroids. Agree liver bx can be held off. F/u with Dr Ramon after discharge

## 2023-12-14 LAB
-  AMOXICILLIN/CLAVULANIC ACID: SIGNIFICANT CHANGE UP
-  AMOXICILLIN/CLAVULANIC ACID: SIGNIFICANT CHANGE UP
-  AMPICILLIN/SULBACTAM: SIGNIFICANT CHANGE UP
-  AMPICILLIN/SULBACTAM: SIGNIFICANT CHANGE UP
-  AMPICILLIN: SIGNIFICANT CHANGE UP
-  AMPICILLIN: SIGNIFICANT CHANGE UP
-  AZTREONAM: SIGNIFICANT CHANGE UP
-  AZTREONAM: SIGNIFICANT CHANGE UP
-  CEFAZOLIN: SIGNIFICANT CHANGE UP
-  CEFAZOLIN: SIGNIFICANT CHANGE UP
-  CEFEPIME: SIGNIFICANT CHANGE UP
-  CEFEPIME: SIGNIFICANT CHANGE UP
-  CEFOXITIN: SIGNIFICANT CHANGE UP
-  CEFOXITIN: SIGNIFICANT CHANGE UP
-  CEFTRIAXONE: SIGNIFICANT CHANGE UP
-  CEFTRIAXONE: SIGNIFICANT CHANGE UP
-  CIPROFLOXACIN: SIGNIFICANT CHANGE UP
-  CIPROFLOXACIN: SIGNIFICANT CHANGE UP
-  ERTAPENEM: SIGNIFICANT CHANGE UP
-  ERTAPENEM: SIGNIFICANT CHANGE UP
-  GENTAMICIN: SIGNIFICANT CHANGE UP
-  GENTAMICIN: SIGNIFICANT CHANGE UP
-  IMIPENEM: SIGNIFICANT CHANGE UP
-  IMIPENEM: SIGNIFICANT CHANGE UP
-  LEVOFLOXACIN: SIGNIFICANT CHANGE UP
-  LEVOFLOXACIN: SIGNIFICANT CHANGE UP
-  MEROPENEM: SIGNIFICANT CHANGE UP
-  MEROPENEM: SIGNIFICANT CHANGE UP
-  NITROFURANTOIN: SIGNIFICANT CHANGE UP
-  NITROFURANTOIN: SIGNIFICANT CHANGE UP
-  PIPERACILLIN/TAZOBACTAM: SIGNIFICANT CHANGE UP
-  PIPERACILLIN/TAZOBACTAM: SIGNIFICANT CHANGE UP
-  TOBRAMYCIN: SIGNIFICANT CHANGE UP
-  TOBRAMYCIN: SIGNIFICANT CHANGE UP
-  TRIMETHOPRIM/SULFAMETHOXAZOLE: SIGNIFICANT CHANGE UP
-  TRIMETHOPRIM/SULFAMETHOXAZOLE: SIGNIFICANT CHANGE UP
ALBUMIN SERPL ELPH-MCNC: 3.3 G/DL — SIGNIFICANT CHANGE UP (ref 3.3–5)
ALBUMIN SERPL ELPH-MCNC: 3.3 G/DL — SIGNIFICANT CHANGE UP (ref 3.3–5)
ALP SERPL-CCNC: 891 U/L — HIGH (ref 40–120)
ALP SERPL-CCNC: 891 U/L — HIGH (ref 40–120)
ALT FLD-CCNC: 597 U/L — HIGH (ref 10–45)
ALT FLD-CCNC: 597 U/L — HIGH (ref 10–45)
ANION GAP SERPL CALC-SCNC: 13 MMOL/L — SIGNIFICANT CHANGE UP (ref 5–17)
ANION GAP SERPL CALC-SCNC: 13 MMOL/L — SIGNIFICANT CHANGE UP (ref 5–17)
APPEARANCE UR: ABNORMAL
APPEARANCE UR: ABNORMAL
APTT BLD: 32.9 SEC — SIGNIFICANT CHANGE UP (ref 24.5–35.6)
APTT BLD: 32.9 SEC — SIGNIFICANT CHANGE UP (ref 24.5–35.6)
AST SERPL-CCNC: 681 U/L — HIGH (ref 10–40)
AST SERPL-CCNC: 681 U/L — HIGH (ref 10–40)
BACTERIA # UR AUTO: ABNORMAL /HPF
BACTERIA # UR AUTO: ABNORMAL /HPF
BILIRUB DIRECT SERPL-MCNC: 2.2 MG/DL — HIGH (ref 0–0.3)
BILIRUB DIRECT SERPL-MCNC: 2.2 MG/DL — HIGH (ref 0–0.3)
BILIRUB INDIRECT FLD-MCNC: 1 MG/DL — SIGNIFICANT CHANGE UP (ref 0.2–1)
BILIRUB INDIRECT FLD-MCNC: 1 MG/DL — SIGNIFICANT CHANGE UP (ref 0.2–1)
BILIRUB SERPL-MCNC: 3.2 MG/DL — HIGH (ref 0.2–1.2)
BILIRUB SERPL-MCNC: 3.2 MG/DL — HIGH (ref 0.2–1.2)
BILIRUB UR-MCNC: NEGATIVE — SIGNIFICANT CHANGE UP
BILIRUB UR-MCNC: NEGATIVE — SIGNIFICANT CHANGE UP
BUN SERPL-MCNC: 38 MG/DL — HIGH (ref 7–23)
BUN SERPL-MCNC: 38 MG/DL — HIGH (ref 7–23)
CALCIUM SERPL-MCNC: 9.3 MG/DL — SIGNIFICANT CHANGE UP (ref 8.4–10.5)
CALCIUM SERPL-MCNC: 9.3 MG/DL — SIGNIFICANT CHANGE UP (ref 8.4–10.5)
CAST: 2 /LPF — SIGNIFICANT CHANGE UP (ref 0–4)
CAST: 2 /LPF — SIGNIFICANT CHANGE UP (ref 0–4)
CHLORIDE SERPL-SCNC: 100 MMOL/L — SIGNIFICANT CHANGE UP (ref 96–108)
CHLORIDE SERPL-SCNC: 100 MMOL/L — SIGNIFICANT CHANGE UP (ref 96–108)
CO2 SERPL-SCNC: 24 MMOL/L — SIGNIFICANT CHANGE UP (ref 22–31)
CO2 SERPL-SCNC: 24 MMOL/L — SIGNIFICANT CHANGE UP (ref 22–31)
COLOR SPEC: YELLOW — SIGNIFICANT CHANGE UP
COLOR SPEC: YELLOW — SIGNIFICANT CHANGE UP
CREAT SERPL-MCNC: 1.01 MG/DL — SIGNIFICANT CHANGE UP (ref 0.5–1.3)
CREAT SERPL-MCNC: 1.01 MG/DL — SIGNIFICANT CHANGE UP (ref 0.5–1.3)
CULTURE RESULTS: ABNORMAL
CULTURE RESULTS: ABNORMAL
DIFF PNL FLD: ABNORMAL
DIFF PNL FLD: ABNORMAL
EGFR: 55 ML/MIN/1.73M2 — LOW
EGFR: 55 ML/MIN/1.73M2 — LOW
GLUCOSE BLDC GLUCOMTR-MCNC: 239 MG/DL — HIGH (ref 70–99)
GLUCOSE BLDC GLUCOMTR-MCNC: 239 MG/DL — HIGH (ref 70–99)
GLUCOSE BLDC GLUCOMTR-MCNC: 316 MG/DL — HIGH (ref 70–99)
GLUCOSE BLDC GLUCOMTR-MCNC: 316 MG/DL — HIGH (ref 70–99)
GLUCOSE BLDC GLUCOMTR-MCNC: 326 MG/DL — HIGH (ref 70–99)
GLUCOSE BLDC GLUCOMTR-MCNC: 326 MG/DL — HIGH (ref 70–99)
GLUCOSE BLDC GLUCOMTR-MCNC: 438 MG/DL — HIGH (ref 70–99)
GLUCOSE BLDC GLUCOMTR-MCNC: 438 MG/DL — HIGH (ref 70–99)
GLUCOSE SERPL-MCNC: 268 MG/DL — HIGH (ref 70–99)
GLUCOSE SERPL-MCNC: 268 MG/DL — HIGH (ref 70–99)
GLUCOSE UR QL: >=1000 MG/DL
GLUCOSE UR QL: >=1000 MG/DL
HCT VFR BLD CALC: 35 % — SIGNIFICANT CHANGE UP (ref 34.5–45)
HCT VFR BLD CALC: 35 % — SIGNIFICANT CHANGE UP (ref 34.5–45)
HGB BLD-MCNC: 11.5 G/DL — SIGNIFICANT CHANGE UP (ref 11.5–15.5)
HGB BLD-MCNC: 11.5 G/DL — SIGNIFICANT CHANGE UP (ref 11.5–15.5)
INR BLD: 1.36 RATIO — HIGH (ref 0.85–1.18)
INR BLD: 1.36 RATIO — HIGH (ref 0.85–1.18)
KETONES UR-MCNC: NEGATIVE MG/DL — SIGNIFICANT CHANGE UP
KETONES UR-MCNC: NEGATIVE MG/DL — SIGNIFICANT CHANGE UP
LEUKOCYTE ESTERASE UR-ACNC: ABNORMAL
LEUKOCYTE ESTERASE UR-ACNC: ABNORMAL
MCHC RBC-ENTMCNC: 29.9 PG — SIGNIFICANT CHANGE UP (ref 27–34)
MCHC RBC-ENTMCNC: 29.9 PG — SIGNIFICANT CHANGE UP (ref 27–34)
MCHC RBC-ENTMCNC: 32.9 GM/DL — SIGNIFICANT CHANGE UP (ref 32–36)
MCHC RBC-ENTMCNC: 32.9 GM/DL — SIGNIFICANT CHANGE UP (ref 32–36)
MCV RBC AUTO: 91.1 FL — SIGNIFICANT CHANGE UP (ref 80–100)
MCV RBC AUTO: 91.1 FL — SIGNIFICANT CHANGE UP (ref 80–100)
METHOD TYPE: SIGNIFICANT CHANGE UP
METHOD TYPE: SIGNIFICANT CHANGE UP
NITRITE UR-MCNC: NEGATIVE — SIGNIFICANT CHANGE UP
NITRITE UR-MCNC: NEGATIVE — SIGNIFICANT CHANGE UP
NRBC # BLD: 0 /100 WBCS — SIGNIFICANT CHANGE UP (ref 0–0)
NRBC # BLD: 0 /100 WBCS — SIGNIFICANT CHANGE UP (ref 0–0)
ORGANISM # SPEC MICROSCOPIC CNT: ABNORMAL
PH UR: 5.5 — SIGNIFICANT CHANGE UP (ref 5–8)
PH UR: 5.5 — SIGNIFICANT CHANGE UP (ref 5–8)
PLATELET # BLD AUTO: 304 K/UL — SIGNIFICANT CHANGE UP (ref 150–400)
PLATELET # BLD AUTO: 304 K/UL — SIGNIFICANT CHANGE UP (ref 150–400)
POTASSIUM SERPL-MCNC: 3.1 MMOL/L — LOW (ref 3.5–5.3)
POTASSIUM SERPL-MCNC: 3.1 MMOL/L — LOW (ref 3.5–5.3)
POTASSIUM SERPL-SCNC: 3.1 MMOL/L — LOW (ref 3.5–5.3)
POTASSIUM SERPL-SCNC: 3.1 MMOL/L — LOW (ref 3.5–5.3)
PROT SERPL-MCNC: 6 G/DL — SIGNIFICANT CHANGE UP (ref 6–8.3)
PROT SERPL-MCNC: 6 G/DL — SIGNIFICANT CHANGE UP (ref 6–8.3)
PROT UR-MCNC: SIGNIFICANT CHANGE UP MG/DL
PROT UR-MCNC: SIGNIFICANT CHANGE UP MG/DL
PROTHROM AB SERPL-ACNC: 14.2 SEC — HIGH (ref 9.5–13)
PROTHROM AB SERPL-ACNC: 14.2 SEC — HIGH (ref 9.5–13)
RBC # BLD: 3.84 M/UL — SIGNIFICANT CHANGE UP (ref 3.8–5.2)
RBC # BLD: 3.84 M/UL — SIGNIFICANT CHANGE UP (ref 3.8–5.2)
RBC # FLD: 15.9 % — HIGH (ref 10.3–14.5)
RBC # FLD: 15.9 % — HIGH (ref 10.3–14.5)
RBC CASTS # UR COMP ASSIST: 3 /HPF — SIGNIFICANT CHANGE UP (ref 0–4)
RBC CASTS # UR COMP ASSIST: 3 /HPF — SIGNIFICANT CHANGE UP (ref 0–4)
SODIUM SERPL-SCNC: 137 MMOL/L — SIGNIFICANT CHANGE UP (ref 135–145)
SODIUM SERPL-SCNC: 137 MMOL/L — SIGNIFICANT CHANGE UP (ref 135–145)
SP GR SPEC: 1.02 — SIGNIFICANT CHANGE UP (ref 1–1.03)
SP GR SPEC: 1.02 — SIGNIFICANT CHANGE UP (ref 1–1.03)
SPECIMEN SOURCE: SIGNIFICANT CHANGE UP
SPECIMEN SOURCE: SIGNIFICANT CHANGE UP
SQUAMOUS # UR AUTO: 2 /HPF — SIGNIFICANT CHANGE UP (ref 0–5)
SQUAMOUS # UR AUTO: 2 /HPF — SIGNIFICANT CHANGE UP (ref 0–5)
T4 FREE SERPL-MCNC: 1.6 NG/DL — SIGNIFICANT CHANGE UP (ref 0.9–1.8)
T4 FREE SERPL-MCNC: 1.6 NG/DL — SIGNIFICANT CHANGE UP (ref 0.9–1.8)
TSH SERPL-MCNC: 0.83 UIU/ML — SIGNIFICANT CHANGE UP (ref 0.27–4.2)
TSH SERPL-MCNC: 0.83 UIU/ML — SIGNIFICANT CHANGE UP (ref 0.27–4.2)
UROBILINOGEN FLD QL: 1 MG/DL — SIGNIFICANT CHANGE UP (ref 0.2–1)
UROBILINOGEN FLD QL: 1 MG/DL — SIGNIFICANT CHANGE UP (ref 0.2–1)
WBC # BLD: 6.74 K/UL — SIGNIFICANT CHANGE UP (ref 3.8–10.5)
WBC # BLD: 6.74 K/UL — SIGNIFICANT CHANGE UP (ref 3.8–10.5)
WBC # FLD AUTO: 6.74 K/UL — SIGNIFICANT CHANGE UP (ref 3.8–10.5)
WBC # FLD AUTO: 6.74 K/UL — SIGNIFICANT CHANGE UP (ref 3.8–10.5)
WBC UR QL: 249 /HPF — HIGH (ref 0–5)
WBC UR QL: 249 /HPF — HIGH (ref 0–5)

## 2023-12-14 PROCEDURE — 72170 X-RAY EXAM OF PELVIS: CPT | Mod: 26

## 2023-12-14 RX ORDER — INSULIN LISPRO 100/ML
8 VIAL (ML) SUBCUTANEOUS
Refills: 0 | Status: DISCONTINUED | OUTPATIENT
Start: 2023-12-14 | End: 2023-12-14

## 2023-12-14 RX ORDER — SALIVA SUBSTITUTE COMB NO.11 351 MG
5 POWDER IN PACKET (EA) MUCOUS MEMBRANE
Refills: 0 | Status: DISCONTINUED | OUTPATIENT
Start: 2023-12-14 | End: 2023-12-18

## 2023-12-14 RX ORDER — INSULIN LISPRO 100/ML
10 VIAL (ML) SUBCUTANEOUS
Refills: 0 | Status: DISCONTINUED | OUTPATIENT
Start: 2023-12-14 | End: 2023-12-15

## 2023-12-14 RX ORDER — INSULIN GLARGINE 100 [IU]/ML
22 INJECTION, SOLUTION SUBCUTANEOUS AT BEDTIME
Refills: 0 | Status: DISCONTINUED | OUTPATIENT
Start: 2023-12-14 | End: 2023-12-17

## 2023-12-14 RX ADMIN — Medication 10 UNIT(S): at 18:05

## 2023-12-14 RX ADMIN — GABAPENTIN 300 MILLIGRAM(S): 400 CAPSULE ORAL at 21:32

## 2023-12-14 RX ADMIN — Medication 1 DROP(S): at 05:33

## 2023-12-14 RX ADMIN — LEVETIRACETAM 1000 MILLIGRAM(S): 250 TABLET, FILM COATED ORAL at 05:33

## 2023-12-14 RX ADMIN — Medication 40 MILLIGRAM(S): at 05:34

## 2023-12-14 RX ADMIN — Medication 50 MILLIGRAM(S): at 18:06

## 2023-12-14 RX ADMIN — Medication 1 DROP(S): at 14:34

## 2023-12-14 RX ADMIN — INSULIN GLARGINE 22 UNIT(S): 100 INJECTION, SOLUTION SUBCUTANEOUS at 21:33

## 2023-12-14 RX ADMIN — TRAMADOL HYDROCHLORIDE 50 MILLIGRAM(S): 50 TABLET ORAL at 15:08

## 2023-12-14 RX ADMIN — TRAMADOL HYDROCHLORIDE 50 MILLIGRAM(S): 50 TABLET ORAL at 14:34

## 2023-12-14 RX ADMIN — Medication 4: at 09:35

## 2023-12-14 RX ADMIN — TRAMADOL HYDROCHLORIDE 50 MILLIGRAM(S): 50 TABLET ORAL at 21:33

## 2023-12-14 RX ADMIN — Medication 50 MILLIGRAM(S): at 05:33

## 2023-12-14 RX ADMIN — APIXABAN 2.5 MILLIGRAM(S): 2.5 TABLET, FILM COATED ORAL at 05:33

## 2023-12-14 RX ADMIN — Medication 10 UNIT(S): at 14:35

## 2023-12-14 RX ADMIN — Medication 1 DROP(S): at 21:33

## 2023-12-14 RX ADMIN — Medication 6: at 14:35

## 2023-12-14 RX ADMIN — TRAMADOL HYDROCHLORIDE 50 MILLIGRAM(S): 50 TABLET ORAL at 22:03

## 2023-12-14 RX ADMIN — TRAMADOL HYDROCHLORIDE 50 MILLIGRAM(S): 50 TABLET ORAL at 05:34

## 2023-12-14 RX ADMIN — Medication 1000 UNIT(S): at 12:59

## 2023-12-14 RX ADMIN — LEVETIRACETAM 1000 MILLIGRAM(S): 250 TABLET, FILM COATED ORAL at 18:04

## 2023-12-14 RX ADMIN — GABAPENTIN 100 MILLIGRAM(S): 400 CAPSULE ORAL at 12:59

## 2023-12-14 RX ADMIN — APIXABAN 2.5 MILLIGRAM(S): 2.5 TABLET, FILM COATED ORAL at 18:06

## 2023-12-14 RX ADMIN — TRAMADOL HYDROCHLORIDE 50 MILLIGRAM(S): 50 TABLET ORAL at 06:04

## 2023-12-14 RX ADMIN — Medication 4: at 18:04

## 2023-12-14 NOTE — SWALLOW BEDSIDE ASSESSMENT ADULT - SLP PERTINENT HISTORY OF CURRENT PROBLEM
82yo F w/ PMH of HTN, HLD, DM2, CAD c/b MI, Afib on Eliquis, PAD, metastatic lung CA w/ known brain mets s/p craniectomy previously on Keytruda pw elevated liver enzymes noted on outpt labs 84yo F w/ PMH of HTN, HLD, DM2, CAD c/b MI, Afib on Eliquis, PAD, metastatic lung CA w/ known brain mets s/p craniectomy previously on Keytruda pw elevated liver enzymes noted on outpt labs

## 2023-12-14 NOTE — PROGRESS NOTE ADULT - ASSESSMENT
84yo F w/ PMH of HTN, HLD, DM2, CAD c/b MI, Afib on Eliquis, PAD, metastatic lung CA w/ known brain mets s/p craniectomy previously on Keytruda pw elevated liver enzymes noted on outpt labs       Problem/Plan - 1:  ·  Problem: Hepatocellular injury.   ·  Plan: - ddx includes DILI 2/2 Keytruda vs less likely Keppra as has been on for a year vs AI vs less likely ischemic   - No longer on Keytruda  - US unremarkable   - Order AI w/u  - MR Abdomen w/ IV   - acute viral hepatitis panel, plus HBV PCR, HCV PCR, HEV IgM/IgG  - ASMA, AMA, LKM  - full infectious workup  - UTox   follow up with oncology re: alternatives to Keytruda for further treatment; if no alternatives, can consider liver biopsy to confirm diagnosis; however, if alternatives available, then would defer bx as liver numbers are improving  - trend LFTs  -  follow up with oncology re: alternatives to Keytruda for further treatment; if no alternatives, can consider liver biopsy to confirm diagnosis; however, if alternatives available, then would defer bx as liver numbers are improving  - BCx NGTD  - plan as per hepatology     Problem/Plan - 2:  ·  Problem: Chronic atrial fibrillation.   ·  Plan: - On Eliquis 2.5mg BID, continue   - c/w Lopressor 50mg BID.     Problem/Plan - 3:  ·  Problem: Type 2 diabetes mellitus.   -  AM A1c 7.8  - SALAS for now  - CC diet  - Diabetic education.     Problem/Plan - 4:  ·  Problem: HTN (hypertension).   ·  Plan: - c/w home medications w/ hold parameters.     Problem/Plan - 5:  ·  Problem: HLD (hyperlipidemia).   ·  Plan: - No statin given elevated liver enzymes.     Problem/Plan - 6:  ·  Problem: CAD (coronary artery disease).   ·  Plan: - On Eliquis.     Problem/Plan - 7:  ·  Problem: Primary lung cancer with metastasis from lung to other site.   ·  Plan: - Holding Keytruda  - c/w Keppra for brain mets.     Problem/Plan - 8:  ·  Problem: Prophylactic measure.   ·  Plan: DVT ppx: On Eliquis  Diet: DASH/CC  Dispo: can be discharged if cleared by hepatology   82yo F w/ PMH of HTN, HLD, DM2, CAD c/b MI, Afib on Eliquis, PAD, metastatic lung CA w/ known brain mets s/p craniectomy previously on Keytruda pw elevated liver enzymes noted on outpt labs       Problem/Plan - 1:  ·  Problem: Hepatocellular injury.   ·  Plan: - ddx includes DILI 2/2 Keytruda vs less likely Keppra as has been on for a year vs AI vs less likely ischemic   - No longer on Keytruda  - US unremarkable   - Order AI w/u  - MR Abdomen w/ IV   - acute viral hepatitis panel, plus HBV PCR, HCV PCR, HEV IgM/IgG  - ASMA, AMA, LKM  - full infectious workup  - UTox   follow up with oncology re: alternatives to Keytruda for further treatment; if no alternatives, can consider liver biopsy to confirm diagnosis; however, if alternatives available, then would defer bx as liver numbers are improving  - trend LFTs  -  follow up with oncology re: alternatives to Keytruda for further treatment; if no alternatives, can consider liver biopsy to confirm diagnosis; however, if alternatives available, then would defer bx as liver numbers are improving  - BCx NGTD  - plan as per hepatology     Problem/Plan - 2:  ·  Problem: Chronic atrial fibrillation.   ·  Plan: - On Eliquis 2.5mg BID, continue   - c/w Lopressor 50mg BID.     Problem/Plan - 3:  ·  Problem: Type 2 diabetes mellitus.   -  AM A1c 7.8  - SALAS for now  - CC diet  - Diabetic education.     Problem/Plan - 4:  ·  Problem: HTN (hypertension).   ·  Plan: - c/w home medications w/ hold parameters.     Problem/Plan - 5:  ·  Problem: HLD (hyperlipidemia).   ·  Plan: - No statin given elevated liver enzymes.     Problem/Plan - 6:  ·  Problem: CAD (coronary artery disease).   ·  Plan: - On Eliquis.     Problem/Plan - 7:  ·  Problem: Primary lung cancer with metastasis from lung to other site.   ·  Plan: - Holding Keytruda  - c/w Keppra for brain mets.     Problem/Plan - 8:  ·  Problem: Prophylactic measure.   ·  Plan: DVT ppx: On Eliquis  Diet: DASH/CC  Dispo: can be discharged if cleared by hepatology

## 2023-12-14 NOTE — SWALLOW BEDSIDE ASSESSMENT ADULT - SWALLOW EVAL: DIAGNOSIS
82 yo F pmhx lung ca with brain mets s/p crani; s/p chemo/rad; pmhx R tvc paralysis with c/o choking episode today and c/o "food getting stuck" x 1month. Patient presents with complaints suggestive of a pharyngeal vs pharyngo-esophageal dysphagia; c/o dry mouth/throat may be exacerbating swallowing difficulty. No overt signs of penetration/aspiration on bedside exam however given patient complaints, MBS is recommended for assessment of pharyngeal/pharyngo-esophageal stage. 84 yo F pmhx lung ca with brain mets s/p crani; s/p chemo/rad; pmhx R tvc paralysis with c/o choking episode today and c/o "food getting stuck" x 1month. Patient presents with complaints suggestive of a pharyngeal vs pharyngo-esophageal dysphagia; c/o dry mouth/throat may be exacerbating swallowing difficulty. No overt signs of penetration/aspiration on bedside exam however given patient complaints, MBS is recommended for assessment of pharyngeal/pharyngo-esophageal stage.

## 2023-12-14 NOTE — CHART NOTE - NSCHARTNOTEFT_GEN_A_CORE
Patient is a 83y old  Female who presents with a chief complaint of Elevated liver enzymes (14 Dec 2023 14:00)      Vital Signs Last 24 Hrs  T(C): 36.5 (14 Dec 2023 13:29), Max: 36.7 (13 Dec 2023 20:41)  T(F): 97.7 (14 Dec 2023 13:29), Max: 98 (13 Dec 2023 20:41)  HR: 69 (14 Dec 2023 14:00) (61 - 69)  BP: 119/86 (14 Dec 2023 14:00) (110/65 - 137/59)  BP(mean): --  RR: 18 (14 Dec 2023 13:29) (18 - 18)  SpO2: 96% (14 Dec 2023 13:29) (95% - 99%)    Parameters below as of 14 Dec 2023 13:29  Patient On (Oxygen Delivery Method): room air        Physical Exam:  General: alert, NAD  Neurology: A&Ox3, nonfocal  Head:  Normocephalic, atraumatic  Respiratory: CTA B/L  CV: RRR, S1S2, no murmur  Abdominal: Soft, NT, ND no palpable mass  MSK: No edema,    Labs:                          11.5   6.74  )-----------( 304      ( 14 Dec 2023 07:14 )             35.0     12-14    137  |  100  |  38<H>  ----------------------------<  268<H>  3.1<L>   |  24  |  1.01    Ca    9.3      14 Dec 2023 07:14    TPro  6.0  /  Alb  3.3  /  TBili  3.2<H>  /  DBili  2.2<H>  /  AST  681<H>  /  ALT  597<H>  /  AlkPhos  891<H>  12-14            Radiology:    HPI:  Pt is an 84yo F w/ PMH of HTN, HLD, DM2, CAD c/b MI, Afib on Eliquis, PAD, metastatic lung CA w/ known brain mets s/p craniectomy previously on Keytruda pw elevated liver enzymes noted on outpt labs.     Only symptoms she endorses are feeling weak w/ decreased appetite x1 yr since craniectomy. Went to endo visit about 2-3 weeks ago and was noted to have increased liver enzymes which continued to worsen over that time period, leading ot her presentation to [facility].     Here she denies any HA, dizziness, CP, SOB, abd pain, N/V. Does endorse pruritis that's unchanged. In the ED VSS w/ labs as below w/ elevated liver enzymes. (09 Dec 2023 21:48)    Assessment & Plan:  >  >  >  >  Leida Sequeira NP-BC Patient is a 83y old  Female who presents with a chief complaint of Elevated liver enzymes (14 Dec 2023 14:00)      Vital Signs Last 24 Hrs  T(C): 36.5 (14 Dec 2023 13:29), Max: 36.7 (13 Dec 2023 20:41)  T(F): 97.7 (14 Dec 2023 13:29), Max: 98 (13 Dec 2023 20:41)  HR: 69 (14 Dec 2023 14:00) (61 - 69)  BP: 119/86 (14 Dec 2023 14:00) (110/65 - 137/59)  BP(mean): --  RR: 18 (14 Dec 2023 13:29) (18 - 18)  SpO2: 96% (14 Dec 2023 13:29) (95% - 99%)    Parameters below as of 14 Dec 2023 13:29  Patient On (Oxygen Delivery Method): room air        Physical Exam:  General: alert, NAD  Neurology: A&Ox3, nonfocal  Head:  Normocephalic, atraumatic  Respiratory: CTA B/L  CV: RRR, S1S2, no murmur  Abdominal: Soft, NT, ND no palpable mass  MSK: No edema,    Labs:                          11.5   6.74  )-----------( 304      ( 14 Dec 2023 07:14 )             35.0     12-14    137  |  100  |  38<H>  ----------------------------<  268<H>  3.1<L>   |  24  |  1.01    Ca    9.3      14 Dec 2023 07:14    TPro  6.0  /  Alb  3.3  /  TBili  3.2<H>  /  DBili  2.2<H>  /  AST  681<H>  /  ALT  597<H>  /  AlkPhos  891<H>  12-14            Radiology:    HPI:  Pt is an 82yo F w/ PMH of HTN, HLD, DM2, CAD c/b MI, Afib on Eliquis, PAD, metastatic lung CA w/ known brain mets s/p craniectomy previously on Keytruda pw elevated liver enzymes noted on outpt labs.     Only symptoms she endorses are feeling weak w/ decreased appetite x1 yr since craniectomy. Went to endo visit about 2-3 weeks ago and was noted to have increased liver enzymes which continued to worsen over that time period, leading ot her presentation to [facility].     Here she denies any HA, dizziness, CP, SOB, abd pain, N/V. Does endorse pruritis that's unchanged. In the ED VSS w/ labs as below w/ elevated liver enzymes. (09 Dec 2023 21:48)    Assessment & Plan:  >  >  >  >  Leida Sequeira NP-BC Patient is a 83y old  Female who presents with a chief complaint of Elevated liver enzymes (14 Dec 2023 14:00)  Patient had choking episode today which resolved. She states her mouth is dry and feels "a lump in her throat".    Vital Signs Last 24 Hrs  T(C): 36.5 (14 Dec 2023 13:29), Max: 36.7 (13 Dec 2023 20:41)  T(F): 97.7 (14 Dec 2023 13:29), Max: 98 (13 Dec 2023 20:41)  HR: 69 (14 Dec 2023 14:00) (61 - 69)  BP: 119/86 (14 Dec 2023 14:00) (110/65 - 137/59)  BP(mean): --  RR: 18 (14 Dec 2023 13:29) (18 - 18)  SpO2: 96% (14 Dec 2023 13:29) (95% - 99%)    Parameters below as of 14 Dec 2023 13:29  Patient On (Oxygen Delivery Method): room air        Physical Exam:  General: alert, NAD  Neurology: A&Ox3, nonfocal  Head:  Normocephalic, atraumatic  Respiratory: CTA B/L  CV: RRR, S1S2, no murmur  Abdominal: Soft, NT, ND no palpable mass  MSK: No edema,    Labs:                          11.5   6.74  )-----------( 304      ( 14 Dec 2023 07:14 )             35.0     12-14    137  |  100  |  38<H>  ----------------------------<  268<H>  3.1<L>   |  24  |  1.01    Ca    9.3      14 Dec 2023 07:14    TPro  6.0  /  Alb  3.3  /  TBili  3.2<H>  /  DBili  2.2<H>  /  AST  681<H>  /  ALT  597<H>  /  AlkPhos  891<H>  12-14            Radiology:    HPI:  Pt is an 84yo F w/ PMH of HTN, HLD, DM2, CAD c/b MI, Afib on Eliquis, PAD, metastatic lung CA w/ known brain mets s/p craniectomy previously on Keytruda pw elevated liver enzymes noted on outpt labs.     Only symptoms she endorses are feeling weak w/ decreased appetite x1 yr since craniectomy. Went to endo visit about 2-3 weeks ago and was noted to have increased liver enzymes which continued to worsen over that time period, leading ot her presentation to [facility].     Here she denies any HA, dizziness, CP, SOB, abd pain, N/V. Does endorse pruritis that's unchanged. In the ED VSS w/ labs as below w/ elevated liver enzymes. (09 Dec 2023 21:48)      Patient had choking episode today which resolved. She states her mouth is dry and feels "a lump in her throat".      Assessment & Plan:  >Choking episode/ ?mass  S&S eval - recommends MBS and Biotene  >  >  >  Leida Sequeira NP-BC Patient is a 83y old  Female who presents with a chief complaint of Elevated liver enzymes (14 Dec 2023 14:00)  Patient had choking episode today which resolved. She states her mouth is dry and feels "a lump in her throat".    Vital Signs Last 24 Hrs  T(C): 36.5 (14 Dec 2023 13:29), Max: 36.7 (13 Dec 2023 20:41)  T(F): 97.7 (14 Dec 2023 13:29), Max: 98 (13 Dec 2023 20:41)  HR: 69 (14 Dec 2023 14:00) (61 - 69)  BP: 119/86 (14 Dec 2023 14:00) (110/65 - 137/59)  BP(mean): --  RR: 18 (14 Dec 2023 13:29) (18 - 18)  SpO2: 96% (14 Dec 2023 13:29) (95% - 99%)    Parameters below as of 14 Dec 2023 13:29  Patient On (Oxygen Delivery Method): room air        Physical Exam:  General: alert, NAD  Neurology: A&Ox3, nonfocal  Head:  Normocephalic, atraumatic  Respiratory: CTA B/L  CV: RRR, S1S2, no murmur  Abdominal: Soft, NT, ND no palpable mass  MSK: No edema,    Labs:                          11.5   6.74  )-----------( 304      ( 14 Dec 2023 07:14 )             35.0     12-14    137  |  100  |  38<H>  ----------------------------<  268<H>  3.1<L>   |  24  |  1.01    Ca    9.3      14 Dec 2023 07:14    TPro  6.0  /  Alb  3.3  /  TBili  3.2<H>  /  DBili  2.2<H>  /  AST  681<H>  /  ALT  597<H>  /  AlkPhos  891<H>  12-14            Radiology:    HPI:  Pt is an 82yo F w/ PMH of HTN, HLD, DM2, CAD c/b MI, Afib on Eliquis, PAD, metastatic lung CA w/ known brain mets s/p craniectomy previously on Keytruda pw elevated liver enzymes noted on outpt labs.     Only symptoms she endorses are feeling weak w/ decreased appetite x1 yr since craniectomy. Went to endo visit about 2-3 weeks ago and was noted to have increased liver enzymes which continued to worsen over that time period, leading ot her presentation to [facility].     Here she denies any HA, dizziness, CP, SOB, abd pain, N/V. Does endorse pruritis that's unchanged. In the ED VSS w/ labs as below w/ elevated liver enzymes. (09 Dec 2023 21:48)      Patient had choking episode today which resolved. She states her mouth is dry and feels "a lump in her throat".      Assessment & Plan:  >Choking episode/ ?mass  S&S eval - recommends MBS and Biotene  >  >  >  Leida Sequeira NP-BC

## 2023-12-14 NOTE — PROGRESS NOTE ADULT - ASSESSMENT
82yo F w/ PMH of HTN, HLD, DM2, CAD c/b MI, Afib on Eliquis, PAD, metastatic lung CA w/ known brain mets s/p craniectomy previously on Keytruda pw elevated liver enzymes. Previously seen by our service. Endocrine consulted for hyperglycemias  Assessment  DMT2: 83y Female with DM T2 with hyperglycemia, A1C 7.8%, was on oral meds and insulin at home, now on basal bolus insulin with coverage, blood sugars running high. On  prednisone PO, having steroid induced hyperglycemias.   HTN: on antihypertensive medications, monitored, asymptomatic.  Obesity: No strict exercise routines, not on any weight loss program, neither on low calorie diet. On Home GLP1-a      Discussed plan and management wit Dr La Ivey NP-TEAMS  Marsha Tovar MD  Cell: [phone]  Office: [phone]               84yo F w/ PMH of HTN, HLD, DM2, CAD c/b MI, Afib on Eliquis, PAD, metastatic lung CA w/ known brain mets s/p craniectomy previously on Keytruda pw elevated liver enzymes. Previously seen by our service. Endocrine consulted for hyperglycemias  Assessment  DMT2: 83y Female with DM T2 with hyperglycemia, A1C 7.8%, was on oral meds and insulin at home, now on basal bolus insulin with coverage, blood sugars running high. On  prednisone PO, having steroid induced hyperglycemias.   HTN: on antihypertensive medications, monitored, asymptomatic.  Obesity: No strict exercise routines, not on any weight loss program, neither on low calorie diet. On Home GLP1-a      Discussed plan and management wit Dr La Ivey NP-TEAMS  Marsha Tovar MD  Cell: [phone]  Office: [phone]               84yo F w/ PMH of HTN, HLD, DM2, CAD c/b MI, Afib on Eliquis, PAD, metastatic lung CA w/ known brain mets s/p craniectomy previously on Keytruda pw elevated liver enzymes. Previously seen by our service. Endocrine consulted for hyperglycemias  Assessment  DMT2: 83y Female with DM T2 with hyperglycemia, A1C 7.8%, was on oral meds and insulin at home, now on basal bolus insulin with coverage, blood sugars running high. On  prednisone PO, having steroid  induced hyperglycemias.   HTN: on antihypertensive medications, monitored, asymptomatic.  Obesity: No strict exercise routines, not on any weight loss program, neither on low calorie diet. On Home GLP1-a      Discussed plan and management wit Dr La Ivey NP-TEAMS  Marsha Tovar MD  Cell: [phone]  Office: [phone]               82yo F w/ PMH of HTN, HLD, DM2, CAD c/b MI, Afib on Eliquis, PAD, metastatic lung CA w/ known brain mets s/p craniectomy previously on Keytruda pw elevated liver enzymes. Previously seen by our service. Endocrine consulted for hyperglycemias  Assessment  DMT2: 83y Female with DM T2 with hyperglycemia, A1C 7.8%, was on oral meds and insulin at home, now on basal bolus insulin with coverage, blood sugars running high. On  prednisone PO, having steroid  induced hyperglycemias.   HTN: on antihypertensive medications, monitored, asymptomatic.  Obesity: No strict exercise routines, not on any weight loss program, neither on low calorie diet. On Home GLP1-a      Discussed plan and management wit Dr La Ivey NP-TEAMS  Marsha Tovar MD  Cell: [phone]  Office: [phone]

## 2023-12-14 NOTE — PROGRESS NOTE ADULT - SUBJECTIVE AND OBJECTIVE BOX
Chief complaint  Patient is a 83y old  Female who presents with a chief complaint of Elevated liver enzymes (14 Dec 2023 11:16)         Labs and Fingersticks  CAPILLARY BLOOD GLUCOSE      POCT Blood Glucose.: 326 mg/dL (14 Dec 2023 09:32)  POCT Blood Glucose.: 315 mg/dL (13 Dec 2023 21:28)  POCT Blood Glucose.: 305 mg/dL (13 Dec 2023 18:21)      Anion Gap: 13 (12-14 @ 07:14)  Anion Gap: 12 (12-13 @ 07:09)      Calcium: 9.3 (12-14 @ 07:14)  Calcium: 9.1 (12-13 @ 07:09)  Albumin: 3.3 (12-14 @ 07:14)  Albumin: 3.4 (12-13 @ 07:09)    Alanine Aminotransferase (ALT/SGPT): 597 *H* (12-14 @ 07:14)  Alanine Aminotransferase (ALT/SGPT): 667 *H* (12-13 @ 07:09)  Alkaline Phosphatase: 891 *H* (12-14 @ 07:14)  Alkaline Phosphatase: 963 *H* (12-13 @ 07:09)  Aspartate Aminotransferase (AST/SGOT): 681 *H* (12-14 @ 07:14)  Aspartate Aminotransferase (AST/SGOT): 901 *H* (12-13 @ 07:09)        12-14    137  |  100  |  38<H>  ----------------------------<  268<H>  3.1<L>   |  24  |  1.01    Ca    9.3      14 Dec 2023 07:14    TPro  6.0  /  Alb  3.3  /  TBili  3.2<H>  /  DBili  2.2<H>  /  AST  681<H>  /  ALT  597<H>  /  AlkPhos  891<H>  12-14                        11.5   6.74  )-----------( 304      ( 14 Dec 2023 07:14 )             35.0     Medications  MEDICATIONS  (STANDING):  apixaban 2.5 milliGRAM(s) Oral every 12 hours  artificial tears (preservative free) Ophthalmic Solution 1 Drop(s) Both EYES three times a day  bisacodyl Suppository 10 milliGRAM(s) Rectal once  cholecalciferol 1000 Unit(s) Oral daily  dextrose 5%. 1000 milliLiter(s) (50 mL/Hr) IV Continuous <Continuous>  dextrose 5%. 1000 milliLiter(s) (100 mL/Hr) IV Continuous <Continuous>  dextrose 50% Injectable 12.5 Gram(s) IV Push once  dextrose 50% Injectable 25 Gram(s) IV Push once  dextrose 50% Injectable 25 Gram(s) IV Push once  gabapentin 300 milliGRAM(s) Oral at bedtime  gabapentin 100 milliGRAM(s) Oral daily  glucagon  Injectable 1 milliGRAM(s) IntraMuscular once  insulin glargine Injectable (LANTUS) 22 Unit(s) SubCutaneous at bedtime  insulin lispro (ADMELOG) corrective regimen sliding scale   SubCutaneous at bedtime  insulin lispro (ADMELOG) corrective regimen sliding scale   SubCutaneous three times a day before meals  insulin lispro Injectable (ADMELOG) 10 Unit(s) SubCutaneous three times a day before meals  levETIRAcetam 1000 milliGRAM(s) Oral two times a day  metoprolol tartrate 50 milliGRAM(s) Oral two times a day  predniSONE   Tablet 40 milliGRAM(s) Oral daily  traMADol 50 milliGRAM(s) Oral three times a day      Physical Exam  General: Patient comfortable in bed / chair  Vital Signs Last 12 Hrs  T(F): 97.7 (12-14-23 @ 13:29), Max: 97.9 (12-14-23 @ 05:14)  HR: 62 (12-14-23 @ 13:29) (61 - 67)  BP: 110/65 (12-14-23 @ 13:29) (110/65 - 137/59)  BP(mean): --  RR: 18 (12-14-23 @ 13:29) (18 - 18)  SpO2: 96% (12-14-23 @ 13:29) (95% - 98%)    CVS: S1S2   Respiratory: No wheezing, no crepitations  GI: Abdomen soft, bowel sounds positive  Musculoskeletal:  moves all extremities  : Voiding / Wright ESRD/AVF         Chief complaint  Patient is a 83y old  Female who presents with a chief complaint of Elevated liver enzymes (14 Dec 2023 11:16)         Labs and Fingersticks  CAPILLARY BLOOD GLUCOSE      POCT Blood Glucose.: 326 mg/dL (14 Dec 2023 09:32)  POCT Blood Glucose.: 315 mg/dL (13 Dec 2023 21:28)  POCT Blood Glucose.: 305 mg/dL (13 Dec 2023 18:21)      Anion Gap: 13 (12-14 @ 07:14)  Anion Gap: 12 (12-13 @ 07:09)      Calcium: 9.3 (12-14 @ 07:14)  Calcium: 9.1 (12-13 @ 07:09)  Albumin: 3.3 (12-14 @ 07:14)  Albumin: 3.4 (12-13 @ 07:09)    Alanine Aminotransferase (ALT/SGPT): 597 *H* (12-14 @ 07:14)  Alanine Aminotransferase (ALT/SGPT): 667 *H* (12-13 @ 07:09)  Alkaline Phosphatase: 891 *H* (12-14 @ 07:14)  Alkaline Phosphatase: 963 *H* (12-13 @ 07:09)  Aspartate Aminotransferase (AST/SGOT): 681 *H* (12-14 @ 07:14)  Aspartate Aminotransferase (AST/SGOT): 901 *H* (12-13 @ 07:09)        12-14    137  |  100  |  38<H>  ----------------------------<  268<H>  3.1<L>   |  24  |  1.01    Ca    9.3      14 Dec 2023 07:14    TPro  6.0  /  Alb  3.3  /  TBili  3.2<H>  /  DBili  2.2<H>  /  AST  681<H>  /  ALT  597<H>  /  AlkPhos  891<H>  12-14                        11.5   6.74  )-----------( 304      ( 14 Dec 2023 07:14 )             35.0     Medications  MEDICATIONS  (STANDING):  apixaban 2.5 milliGRAM(s) Oral every 12 hours  artificial tears (preservative free) Ophthalmic Solution 1 Drop(s) Both EYES three times a day  bisacodyl Suppository 10 milliGRAM(s) Rectal once  cholecalciferol 1000 Unit(s) Oral daily  dextrose 5%. 1000 milliLiter(s) (50 mL/Hr) IV Continuous <Continuous>  dextrose 5%. 1000 milliLiter(s) (100 mL/Hr) IV Continuous <Continuous>  dextrose 50% Injectable 12.5 Gram(s) IV Push once  dextrose 50% Injectable 25 Gram(s) IV Push once  dextrose 50% Injectable 25 Gram(s) IV Push once  gabapentin 300 milliGRAM(s) Oral at bedtime  gabapentin 100 milliGRAM(s) Oral daily  glucagon  Injectable 1 milliGRAM(s) IntraMuscular once  insulin glargine Injectable (LANTUS) 22 Unit(s) SubCutaneous at bedtime  insulin lispro (ADMELOG) corrective regimen sliding scale   SubCutaneous at bedtime  insulin lispro (ADMELOG) corrective regimen sliding scale   SubCutaneous three times a day before meals  insulin lispro Injectable (ADMELOG) 10 Unit(s) SubCutaneous three times a day before meals  levETIRAcetam 1000 milliGRAM(s) Oral two times a day  metoprolol tartrate 50 milliGRAM(s) Oral two times a day  predniSONE   Tablet 40 milliGRAM(s) Oral daily  traMADol 50 milliGRAM(s) Oral three times a day      Physical Exam  General: Patient comfortable in bed  Vital Signs Last 12 Hrs  T(F): 97.7 (12-14-23 @ 13:29), Max: 97.9 (12-14-23 @ 05:14)  HR: 62 (12-14-23 @ 13:29) (61 - 67)  BP: 110/65 (12-14-23 @ 13:29) (110/65 - 137/59)  BP(mean): --  RR: 18 (12-14-23 @ 13:29) (18 - 18)  SpO2: 96% (12-14-23 @ 13:29) (95% - 98%)    CVS: S1S2   Respiratory: No wheezing, no crepitations  GI: Abdomen soft, bowel sounds positive  Musculoskeletal:  moves all extremities  : Voiding          Chief complaint  Patient is a 83y old  Female who presents with a chief complaint of Elevated liver enzymes (14 Dec 2023 11:16)     Labs and Fingersticks  CAPILLARY BLOOD GLUCOSE      POCT Blood Glucose.: 326 mg/dL (14 Dec 2023 09:32)  POCT Blood Glucose.: 315 mg/dL (13 Dec 2023 21:28)  POCT Blood Glucose.: 305 mg/dL (13 Dec 2023 18:21)      Anion Gap: 13 (12-14 @ 07:14)  Anion Gap: 12 (12-13 @ 07:09)      Calcium: 9.3 (12-14 @ 07:14)  Calcium: 9.1 (12-13 @ 07:09)  Albumin: 3.3 (12-14 @ 07:14)  Albumin: 3.4 (12-13 @ 07:09)    Alanine Aminotransferase (ALT/SGPT): 597 *H* (12-14 @ 07:14)  Alanine Aminotransferase (ALT/SGPT): 667 *H* (12-13 @ 07:09)  Alkaline Phosphatase: 891 *H* (12-14 @ 07:14)  Alkaline Phosphatase: 963 *H* (12-13 @ 07:09)  Aspartate Aminotransferase (AST/SGOT): 681 *H* (12-14 @ 07:14)  Aspartate Aminotransferase (AST/SGOT): 901 *H* (12-13 @ 07:09)        12-14    137  |  100  |  38<H>  ----------------------------<  268<H>  3.1<L>   |  24  |  1.01    Ca    9.3      14 Dec 2023 07:14    TPro  6.0  /  Alb  3.3  /  TBili  3.2<H>  /  DBili  2.2<H>  /  AST  681<H>  /  ALT  597<H>  /  AlkPhos  891<H>  12-14                        11.5   6.74  )-----------( 304      ( 14 Dec 2023 07:14 )             35.0     Medications  MEDICATIONS  (STANDING):  apixaban 2.5 milliGRAM(s) Oral every 12 hours  artificial tears (preservative free) Ophthalmic Solution 1 Drop(s) Both EYES three times a day  bisacodyl Suppository 10 milliGRAM(s) Rectal once  cholecalciferol 1000 Unit(s) Oral daily  dextrose 5%. 1000 milliLiter(s) (50 mL/Hr) IV Continuous <Continuous>  dextrose 5%. 1000 milliLiter(s) (100 mL/Hr) IV Continuous <Continuous>  dextrose 50% Injectable 12.5 Gram(s) IV Push once  dextrose 50% Injectable 25 Gram(s) IV Push once  dextrose 50% Injectable 25 Gram(s) IV Push once  gabapentin 300 milliGRAM(s) Oral at bedtime  gabapentin 100 milliGRAM(s) Oral daily  glucagon  Injectable 1 milliGRAM(s) IntraMuscular once  insulin glargine Injectable (LANTUS) 22 Unit(s) SubCutaneous at bedtime  insulin lispro (ADMELOG) corrective regimen sliding scale   SubCutaneous at bedtime  insulin lispro (ADMELOG) corrective regimen sliding scale   SubCutaneous three times a day before meals  insulin lispro Injectable (ADMELOG) 10 Unit(s) SubCutaneous three times a day before meals  levETIRAcetam 1000 milliGRAM(s) Oral two times a day  metoprolol tartrate 50 milliGRAM(s) Oral two times a day  predniSONE   Tablet 40 milliGRAM(s) Oral daily  traMADol 50 milliGRAM(s) Oral three times a day      Physical Exam  General: Patient comfortable in bed  Vital Signs Last 12 Hrs  T(F): 97.7 (12-14-23 @ 13:29), Max: 97.9 (12-14-23 @ 05:14)  HR: 62 (12-14-23 @ 13:29) (61 - 67)  BP: 110/65 (12-14-23 @ 13:29) (110/65 - 137/59)  BP(mean): --  RR: 18 (12-14-23 @ 13:29) (18 - 18)  SpO2: 96% (12-14-23 @ 13:29) (95% - 98%)    CVS: S1S2   Respiratory: No wheezing, no crepitations  GI: Abdomen soft, bowel sounds positive  Musculoskeletal:  moves all extremities  : Voiding

## 2023-12-14 NOTE — PROVIDER CONTACT NOTE (FALL NOTIFICATION) - SITUATION
Patient experienced a fall in the bathroom. Pt was found on the bathroom floor in a sitting position.

## 2023-12-14 NOTE — PROGRESS NOTE ADULT - SUBJECTIVE AND OBJECTIVE BOX
CARDIOLOGY FOLLOW UP - Dr. Alcaraz  DATE OF SERVICE: 12/14/23    CC  Events noted  - had mechanical fall this am.  No CV complaints - no cp, dizziness, lightheadedness    REVIEW OF SYSTEMS:  CONSTITUTIONAL: No fever, weight loss, or fatigue  RESPIRATORY: No cough, wheezing, chills or hemoptysis; No Shortness of Breath  CARDIOVASCULAR: No chest pain, palpitations, passing out, dizziness, or leg swelling  GASTROINTESTINAL: No abdominal or epigastric pain. No nausea, vomiting, or hematemesis; No diarrhea or constipation. No melena or hematochezia.  VASCULAR: No edema     PHYSICAL EXAM:  T(C): 36.6 (12-14-23 @ 05:14), Max: 36.8 (12-13-23 @ 14:03)  HR: 61 (12-14-23 @ 05:14) (61 - 70)  BP: 137/59 (12-14-23 @ 05:14) (114/59 - 137/89)  RR: 18 (12-14-23 @ 05:14) (18 - 18)  SpO2: 98% (12-14-23 @ 05:14) (97% - 99%)  Wt(kg): --  I&O's Summary    13 Dec 2023 07:01  -  14 Dec 2023 07:00  --------------------------------------------------------  IN: 600 mL / OUT: 700 mL / NET: -100 mL        Appearance: Normal	  Cardiovascular: Normal S1 S2,RRR, No JVD, No murmurs  Respiratory: Lungs clear to auscultation b/l   Gastrointestinal:  Soft, Non-tender, + BS	  Extremities: Normal range of motion, No clubbing, cyanosis or edema      Home Medications:  gabapentin 100 mg oral capsule: 1 cap(s) orally once a day (in the morning) (09 Dec 2023 15:06)  gabapentin 300 mg oral capsule: 1 cap(s) orally once a day (in the evening) (09 Dec 2023 15:06)  metFORMIN 1000 mg oral tablet: 1 tab(s) orally 2 times a day (11 Dec 2023 10:23)  metoprolol tartrate 50 mg oral tablet: 1 tab(s) orally 2 times a day note: rx has 3 times per day. (11 Dec 2023 10:23)  Ozempic 2 mg/1.5 mL (0.25 mg or 0.5 mg dose) subcutaneous solution: 0.25 milligram(s) subcutaneously once a week on tuesday (11 Dec 2023 10:23)  traMADol 50 mg oral tablet: 1 tab(s) orally 3 times a day (09 Dec 2023 15:04)  Tresiba FlexTouch 100 units/mL subcutaneous solution: 14 unit(s) subcutaneous once a day (11 Dec 2023 10:23)  Vitamin B-12: 1 tab once a day (09 Dec 2023 15:04)  Vitamin D3 25 mcg (1000 intl units) oral tablet: 1 tab(s) orally once a day (09 Dec 2023 15:04)      MEDICATIONS  (STANDING):  apixaban 2.5 milliGRAM(s) Oral every 12 hours  artificial tears (preservative free) Ophthalmic Solution 1 Drop(s) Both EYES three times a day  bisacodyl Suppository 10 milliGRAM(s) Rectal once  cholecalciferol 1000 Unit(s) Oral daily  dextrose 5%. 1000 milliLiter(s) (50 mL/Hr) IV Continuous <Continuous>  dextrose 5%. 1000 milliLiter(s) (100 mL/Hr) IV Continuous <Continuous>  dextrose 50% Injectable 12.5 Gram(s) IV Push once  dextrose 50% Injectable 25 Gram(s) IV Push once  dextrose 50% Injectable 25 Gram(s) IV Push once  gabapentin 300 milliGRAM(s) Oral at bedtime  gabapentin 100 milliGRAM(s) Oral daily  glucagon  Injectable 1 milliGRAM(s) IntraMuscular once  insulin glargine Injectable (LANTUS) 22 Unit(s) SubCutaneous at bedtime  insulin lispro (ADMELOG) corrective regimen sliding scale   SubCutaneous at bedtime  insulin lispro (ADMELOG) corrective regimen sliding scale   SubCutaneous three times a day before meals  insulin lispro Injectable (ADMELOG) 8 Unit(s) SubCutaneous three times a day before meals  levETIRAcetam 1000 milliGRAM(s) Oral two times a day  metoprolol tartrate 50 milliGRAM(s) Oral two times a day  predniSONE   Tablet 40 milliGRAM(s) Oral daily  traMADol 50 milliGRAM(s) Oral three times a day      TELEMETRY: 	    ECG:  	  RADIOLOGY:   DIAGNOSTIC TESTING:  [ ] Echocardiogram:  [ ]  Catheterization:  [ ] Stress Test:    OTHER: 	    LABS:	 	                            11.5   6.74  )-----------( 304      ( 14 Dec 2023 07:14 )             35.0     12-14    137  |  100  |  38<H>  ----------------------------<  268<H>  3.1<L>   |  24  |  1.01    Ca    9.3      14 Dec 2023 07:14    TPro  6.0  /  Alb  3.3  /  TBili  3.2<H>  /  DBili  2.2<H>  /  AST  681<H>  /  ALT  597<H>  /  AlkPhos  891<H>  12-14    PT/INR - ( 14 Dec 2023 07:14 )   PT: 14.2 sec;   INR: 1.36 ratio         PTT - ( 14 Dec 2023 07:14 )  PTT:32.9 sec

## 2023-12-14 NOTE — PROVIDER CONTACT NOTE (OTHER) - ASSESSMENT
LOV 1/13/17.  Last refill Voltaren gel 12/7/15  
Pt Aox4. All VSS as charted. O2sat 95% on RA. No food objects visibly dislodged in throat on assessment. Per pt "for the past month, whenever I eat it feels like I have something stuck in my throat & like indigestion"
Pt Aox4. All VSS as charted. No s/s hyperglycemia noted on assessment.

## 2023-12-14 NOTE — SWALLOW BEDSIDE ASSESSMENT ADULT - COMMENTS
Per MD, hepatocellular injury. ddx includes DILI 2/2 Keytruda vs less likely Keppra as has been on for a year vs AI vs less likely ischemic   Primary lung cancer with metastasis from lung to other site. On keppra for brain mets.    CXR 12/09: no acute findings Per MD, hepatocellular injury. ddx includes DILI 2/2 Keytruda vs less likely Keppra as has been on for a year vs AI vs less likely ischemic   Primary lung cancer with metastasis from lung to other site. On keppra for brain mets.    CXR 12/09: no acute findings    Swallow hx: Known to this service. Seen for MBS 7/22/22 during admission for dx brain mass, craniotomy. Grossly functional oropharyngeal swallow. Recommended Regular/thin diet textures. Per MD, hepatocellular injury. ddx includes DILI 2/2 Keytruda vs less likely Keppra as has been on for a year vs AI vs less likely ischemic   Primary lung cancer with metastasis from lung to other site. On keppra for brain mets.    CXR 12/09: no acute findings    Swallow hx: Known to this service. Seen for MBS 7/22/22 during admission for dx brain mass, craniotomy. Grossly functional oropharyngeal swallow. Recommended Regular/thin diet textures. 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 Unable to locate report in documentation. Patient states will talk with son who may have report and better recall of info.; Pt reports sensation of food getting stuck in throat, inconsistently x1 month. Reports liquid wash does not clear material, instead creates phlegm and regurgitation. Also c/o dry mouth.

## 2023-12-14 NOTE — PROGRESS NOTE ADULT - ASSESSMENT
ECHO 10/3/22: EF 40-45%. Akinesis of the inferior with hypokinesis of the inferolateral, septum and anterolateral hypokinetic.    A/p  83y  woman with a PMHx significant for HTN, DM, HLD, CAD, MI, PAD, peripheral neuropathy, former smoker, NSCLC with ANTHONY primary tumor and brain metastasis s/p right craniotomy & resection on 7/27/22 and post-op RT on active treatment, STEMI (10/2022), afib on eliquis, hx of RML PE presenting with elevated lfts     #elevated lfts   -work up per hepatology  -? 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   -s/p right craniotomy for resection of brain mass 7/27/22  -Heme/onc eval noted     #history of PE - dx in AUG 2022   -cont ac   ECHO 10/3/22: EF 40-45%. Akinesis of the inferior with hypokinesis of the inferolateral, septum and anterolateral hypokinetic.    A/p  83y  woman with a PMHx significant for HTN, DM, HLD, CAD, MI, PAD, peripheral neuropathy, former smoker, NSCLC with ANTHOYN primary tumor and brain metastasis s/p right craniotomy & resection on 7/27/22 and post-op RT on active treatment, STEMI (10/2022), afib on eliquis, hx of RML PE presenting with elevated lfts     #elevated lfts   -work up per hepatology  -? 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   -s/p right craniotomy for resection of brain mass 7/27/22  -Heme/onc eval noted     #history of PE - dx in AUG 2022   -cont ac

## 2023-12-14 NOTE — SWALLOW BEDSIDE ASSESSMENT ADULT - ASR SWALLOW ASPIRATION MONITOR
Monitor for s/s aspiration/laryngeal penetration. If noted:  D/C p.o. intake, provide non-oral nutrition/hydration/meds, and contact this service @ [phone]/change of breathing pattern/cough/gurgly voice/fever/pneumonia/throat clearing/upper respiratory infection Monitor for s/s aspiration/laryngeal penetration. If noted:  D/C p.o. intake, provide non-oral nutrition/hydration/meds, and contact this service @ [phone]/change of breathing pattern/cough/gurgly voice/fever/pneumonia/throat clearing/upper respiratory infection

## 2023-12-14 NOTE — SWALLOW BEDSIDE ASSESSMENT ADULT - SWALLOW EVAL: RECOMMENDED FEEDING/EATING TECHNIQUES
extra gravies/sauces/allow for swallow between intakes/alternate food with liquid/maintain upright posture during/after eating for 30 mins/oral hygiene/position upright (90 degrees)/small sips/bites

## 2023-12-14 NOTE — PROVIDER CONTACT NOTE (OTHER) - RECOMMENDATIONS
NP made aware. Correction dose 6u + 10u STAT (new order) premeal coverage given.
NP came to bedside to eval pt. Airway intact. Pt denies distress, sensation dissipated on its own.

## 2023-12-14 NOTE — PROGRESS NOTE ADULT - SUBJECTIVE AND OBJECTIVE BOX
DATE OF SERVICE: 12-14-23 @ 11:16    Patient is a 83y old  Female who presents with a chief complaint of Elevated liver enzymes (14 Dec 2023 08:57)      SUBJECTIVE / OVERNIGHT EVENTS:  No chest pain. No shortness of breath. No complaints. No events overnight.     MEDICATIONS  (STANDING):  apixaban 2.5 milliGRAM(s) Oral every 12 hours  artificial tears (preservative free) Ophthalmic Solution 1 Drop(s) Both EYES three times a day  bisacodyl Suppository 10 milliGRAM(s) Rectal once  cholecalciferol 1000 Unit(s) Oral daily  dextrose 5%. 1000 milliLiter(s) (50 mL/Hr) IV Continuous <Continuous>  dextrose 5%. 1000 milliLiter(s) (100 mL/Hr) IV Continuous <Continuous>  dextrose 50% Injectable 12.5 Gram(s) IV Push once  dextrose 50% Injectable 25 Gram(s) IV Push once  dextrose 50% Injectable 25 Gram(s) IV Push once  gabapentin 300 milliGRAM(s) Oral at bedtime  gabapentin 100 milliGRAM(s) Oral daily  glucagon  Injectable 1 milliGRAM(s) IntraMuscular once  insulin glargine Injectable (LANTUS) 22 Unit(s) SubCutaneous at bedtime  insulin lispro (ADMELOG) corrective regimen sliding scale   SubCutaneous three times a day before meals  insulin lispro (ADMELOG) corrective regimen sliding scale   SubCutaneous at bedtime  insulin lispro Injectable (ADMELOG) 8 Unit(s) SubCutaneous three times a day before meals  levETIRAcetam 1000 milliGRAM(s) Oral two times a day  metoprolol tartrate 50 milliGRAM(s) Oral two times a day  predniSONE   Tablet 40 milliGRAM(s) Oral daily  traMADol 50 milliGRAM(s) Oral three times a day    MEDICATIONS  (PRN):  dextrose Oral Gel 15 Gram(s) Oral once PRN Blood Glucose LESS THAN 70 milliGRAM(s)/deciliter      Vital Signs Last 24 Hrs  T(C): 36.6 (14 Dec 2023 09:35), Max: 36.8 (13 Dec 2023 14:03)  T(F): 97.8 (14 Dec 2023 09:35), Max: 98.3 (13 Dec 2023 14:03)  HR: 67 (14 Dec 2023 09:35) (61 - 70)  BP: 121/67 (14 Dec 2023 09:35) (114/59 - 137/59)  BP(mean): --  RR: 18 (14 Dec 2023 09:35) (18 - 18)  SpO2: 95% (14 Dec 2023 09:35) (95% - 99%)    Parameters below as of 14 Dec 2023 09:35  Patient On (Oxygen Delivery Method): room air      CAPILLARY BLOOD GLUCOSE      POCT Blood Glucose.: 326 mg/dL (14 Dec 2023 09:32)  POCT Blood Glucose.: 315 mg/dL (13 Dec 2023 21:28)  POCT Blood Glucose.: 305 mg/dL (13 Dec 2023 18:21)  POCT Blood Glucose.: 408 mg/dL (13 Dec 2023 13:58)  POCT Blood Glucose.: 408 mg/dL (13 Dec 2023 13:55)    I&O's Summary    13 Dec 2023 07:01  -  14 Dec 2023 07:00  --------------------------------------------------------  IN: 600 mL / OUT: 700 mL / NET: -100 mL    14 Dec 2023 07:01  -  14 Dec 2023 11:16  --------------------------------------------------------  IN: 320 mL / OUT: 350 mL / NET: -30 mL        PHYSICAL EXAM:  GENERAL: NAD, well-developed  HEAD:  Atraumatic, Normocephalic  EYES: EOMI, PERRLA, conjunctiva and sclera clear  NECK: Supple, No JVD  CHEST/LUNG: Clear to auscultation bilaterally; No wheeze  HEART: Regular rate and rhythm; No murmurs, rubs, or gallops  ABDOMEN: Soft, Nontender, Nondistended; Bowel sounds present  EXTREMITIES:  2+ Peripheral Pulses, No clubbing, cyanosis, or edema  PSYCH: AAOx3  NEUROLOGY: non-focal  SKIN: No rashes or lesions    LABS:                        11.5   6.74  )-----------( 304      ( 14 Dec 2023 07:14 )             35.0     12-14    137  |  100  |  38<H>  ----------------------------<  268<H>  3.1<L>   |  24  |  1.01    Ca    9.3      14 Dec 2023 07:14    TPro  6.0  /  Alb  3.3  /  TBili  3.2<H>  /  DBili  2.2<H>  /  AST  681<H>  /  ALT  597<H>  /  AlkPhos  891<H>  12-14    PT/INR - ( 14 Dec 2023 07:14 )   PT: 14.2 sec;   INR: 1.36 ratio         PTT - ( 14 Dec 2023 07:14 )  PTT:32.9 sec      Urinalysis Basic - ( 14 Dec 2023 07:14 )    Color: x / Appearance: x / SG: x / pH: x  Gluc: 268 mg/dL / Ketone: x  / Bili: x / Urobili: x   Blood: x / Protein: x / Nitrite: x   Leuk Esterase: x / RBC: x / WBC x   Sq Epi: x / Non Sq Epi: x / Bacteria: x        RADIOLOGY & ADDITIONAL TESTS:    Imaging Personally Reviewed:    Consultant(s) Notes Reviewed:      Care Discussed with Consultants/Other Providers:

## 2023-12-14 NOTE — PROVIDER CONTACT NOTE (FALL NOTIFICATION) - ASSESSMENT
Patient complains of mild pain in the buttocks, all vss. HR: 71, BP: 138/82, Temp: 98.0F, SPO2: 100% on RA, RR: 18.

## 2023-12-14 NOTE — CHART NOTE - NSCHARTNOTEFT_GEN_A_CORE
Night Medicine NP    Called by RN as pt with fall while in bathroom. Pt states she missed the toliet bowl and landed on her buttock. denies hitting head. Pt remains A&OX3 and able to make all needs known. Pt with c/o some tenderness to right hip site. Denies LOC, cp, sob, palpations, n/v/d/f/c.   Pelvic xrays with no acute fx or dislocations    Vital Signs Last 24 Hrs  T(C): 36.6 (14 Dec 2023 05:14), Max: 36.8 (13 Dec 2023 14:03)  T(F): 97.9 (14 Dec 2023 05:14), Max: 98.3 (13 Dec 2023 14:03)  HR: 61 (14 Dec 2023 05:14) (61 - 70)  BP: 137/59 (14 Dec 2023 05:14) (114/59 - 137/89)  BP(mean): --  RR: 18 (14 Dec 2023 05:14) (18 - 18)  SpO2: 98% (14 Dec 2023 05:14) (97% - 99%)    Parameters below as of 14 Dec 2023 05:14  Patient On (Oxygen Delivery Method): room air                        11.5   6.74  )-----------( 304      ( 14 Dec 2023 07:14 )             35.0     12-14    137  |  100  |  38<H>  ----------------------------<  268<H>  3.1<L>   |  24  |  1.01    Ca    9.3      14 Dec 2023 07:14    TPro  6.0  /  Alb  3.3  /  TBili  3.2<H>  /  DBili  2.2<H>  /  AST  681<H>  /  ALT  597<H>  /  AlkPhos  891<H>  12-14    PT/INR - ( 14 Dec 2023 07:14 )   PT: 14.2 sec;   INR: 1.36 ratio         PTT - ( 14 Dec 2023 07:14 )  PTT:32.9 sec    General: WN/WD NAD  Neurology: A&Ox3, nonfocal, DE LA GARZA x 4  Head:  Normocephalic, atraumatic  Respiratory: CTA B/L  CV: RRR, S1S2, no murmur  Abdominal: Soft, NT, ND no palpable mass  MSK: No edema, + peripheral pulses, FROM all 4 extremity    A/P:  STAT Pelvic Xray  Pain control   Educated on calling for assistance   Fall precautions in place        Jaci Yuan, ANP-BC  Dept of Medicine

## 2023-12-15 LAB
ALBUMIN SERPL ELPH-MCNC: 3.3 G/DL — SIGNIFICANT CHANGE UP (ref 3.3–5)
ALBUMIN SERPL ELPH-MCNC: 3.3 G/DL — SIGNIFICANT CHANGE UP (ref 3.3–5)
ALP SERPL-CCNC: 799 U/L — HIGH (ref 40–120)
ALP SERPL-CCNC: 799 U/L — HIGH (ref 40–120)
ALT FLD-CCNC: 548 U/L — HIGH (ref 10–45)
ALT FLD-CCNC: 548 U/L — HIGH (ref 10–45)
ANION GAP SERPL CALC-SCNC: 13 MMOL/L — SIGNIFICANT CHANGE UP (ref 5–17)
ANION GAP SERPL CALC-SCNC: 13 MMOL/L — SIGNIFICANT CHANGE UP (ref 5–17)
AST SERPL-CCNC: 549 U/L — HIGH (ref 10–40)
AST SERPL-CCNC: 549 U/L — HIGH (ref 10–40)
BILIRUB SERPL-MCNC: 2.6 MG/DL — HIGH (ref 0.2–1.2)
BILIRUB SERPL-MCNC: 2.6 MG/DL — HIGH (ref 0.2–1.2)
BUN SERPL-MCNC: 35 MG/DL — HIGH (ref 7–23)
BUN SERPL-MCNC: 35 MG/DL — HIGH (ref 7–23)
CALCIUM SERPL-MCNC: 9.4 MG/DL — SIGNIFICANT CHANGE UP (ref 8.4–10.5)
CALCIUM SERPL-MCNC: 9.4 MG/DL — SIGNIFICANT CHANGE UP (ref 8.4–10.5)
CHLORIDE SERPL-SCNC: 104 MMOL/L — SIGNIFICANT CHANGE UP (ref 96–108)
CHLORIDE SERPL-SCNC: 104 MMOL/L — SIGNIFICANT CHANGE UP (ref 96–108)
CO2 SERPL-SCNC: 23 MMOL/L — SIGNIFICANT CHANGE UP (ref 22–31)
CO2 SERPL-SCNC: 23 MMOL/L — SIGNIFICANT CHANGE UP (ref 22–31)
CREAT SERPL-MCNC: 1.04 MG/DL — SIGNIFICANT CHANGE UP (ref 0.5–1.3)
CREAT SERPL-MCNC: 1.04 MG/DL — SIGNIFICANT CHANGE UP (ref 0.5–1.3)
CULTURE RESULTS: SIGNIFICANT CHANGE UP
EGFR: 53 ML/MIN/1.73M2 — LOW
EGFR: 53 ML/MIN/1.73M2 — LOW
GLUCOSE BLDC GLUCOMTR-MCNC: 165 MG/DL — HIGH (ref 70–99)
GLUCOSE BLDC GLUCOMTR-MCNC: 165 MG/DL — HIGH (ref 70–99)
GLUCOSE BLDC GLUCOMTR-MCNC: 262 MG/DL — HIGH (ref 70–99)
GLUCOSE BLDC GLUCOMTR-MCNC: 262 MG/DL — HIGH (ref 70–99)
GLUCOSE BLDC GLUCOMTR-MCNC: 265 MG/DL — HIGH (ref 70–99)
GLUCOSE BLDC GLUCOMTR-MCNC: 265 MG/DL — HIGH (ref 70–99)
GLUCOSE BLDC GLUCOMTR-MCNC: 354 MG/DL — HIGH (ref 70–99)
GLUCOSE BLDC GLUCOMTR-MCNC: 354 MG/DL — HIGH (ref 70–99)
GLUCOSE SERPL-MCNC: 143 MG/DL — HIGH (ref 70–99)
GLUCOSE SERPL-MCNC: 143 MG/DL — HIGH (ref 70–99)
IGA FLD-MCNC: 254 MG/DL — SIGNIFICANT CHANGE UP (ref 84–499)
IGA FLD-MCNC: 254 MG/DL — SIGNIFICANT CHANGE UP (ref 84–499)
IGG FLD-MCNC: 850 MG/DL — SIGNIFICANT CHANGE UP (ref 610–1660)
IGG FLD-MCNC: 850 MG/DL — SIGNIFICANT CHANGE UP (ref 610–1660)
IGG4 SER-MCNC: 21.4 MG/DL — SIGNIFICANT CHANGE UP (ref 1–123)
IGG4 SER-MCNC: 21.4 MG/DL — SIGNIFICANT CHANGE UP (ref 1–123)
IGM SERPL-MCNC: 97 MG/DL — SIGNIFICANT CHANGE UP (ref 35–242)
IGM SERPL-MCNC: 97 MG/DL — SIGNIFICANT CHANGE UP (ref 35–242)
KAPPA LC SER QL IFE: 3.95 MG/DL — HIGH (ref 0.33–1.94)
KAPPA LC SER QL IFE: 3.95 MG/DL — HIGH (ref 0.33–1.94)
KAPPA/LAMBDA FREE LIGHT CHAIN RATIO, SERUM: 1.69 RATIO — HIGH (ref 0.26–1.65)
KAPPA/LAMBDA FREE LIGHT CHAIN RATIO, SERUM: 1.69 RATIO — HIGH (ref 0.26–1.65)
LAMBDA LC SER QL IFE: 2.34 MG/DL — SIGNIFICANT CHANGE UP (ref 0.57–2.63)
LAMBDA LC SER QL IFE: 2.34 MG/DL — SIGNIFICANT CHANGE UP (ref 0.57–2.63)
POTASSIUM SERPL-MCNC: 3.4 MMOL/L — LOW (ref 3.5–5.3)
POTASSIUM SERPL-MCNC: 3.4 MMOL/L — LOW (ref 3.5–5.3)
POTASSIUM SERPL-SCNC: 3.4 MMOL/L — LOW (ref 3.5–5.3)
POTASSIUM SERPL-SCNC: 3.4 MMOL/L — LOW (ref 3.5–5.3)
PROT SERPL-MCNC: 6.1 G/DL — SIGNIFICANT CHANGE UP (ref 6–8.3)
PROT SERPL-MCNC: 6.1 G/DL — SIGNIFICANT CHANGE UP (ref 6–8.3)
SODIUM SERPL-SCNC: 140 MMOL/L — SIGNIFICANT CHANGE UP (ref 135–145)
SODIUM SERPL-SCNC: 140 MMOL/L — SIGNIFICANT CHANGE UP (ref 135–145)
SPECIMEN SOURCE: SIGNIFICANT CHANGE UP

## 2023-12-15 PROCEDURE — 99231 SBSQ HOSP IP/OBS SF/LOW 25: CPT | Mod: GC

## 2023-12-15 PROCEDURE — 74230 X-RAY XM SWLNG FUNCJ C+: CPT | Mod: 26

## 2023-12-15 RX ORDER — INSULIN LISPRO 100/ML
12 VIAL (ML) SUBCUTANEOUS
Refills: 0 | Status: DISCONTINUED | OUTPATIENT
Start: 2023-12-15 | End: 2023-12-17

## 2023-12-15 RX ORDER — CEFTRIAXONE 500 MG/1
1000 INJECTION, POWDER, FOR SOLUTION INTRAMUSCULAR; INTRAVENOUS EVERY 24 HOURS
Refills: 0 | Status: COMPLETED | OUTPATIENT
Start: 2023-12-15 | End: 2023-12-17

## 2023-12-15 RX ADMIN — Medication 1000 UNIT(S): at 12:10

## 2023-12-15 RX ADMIN — TRAMADOL HYDROCHLORIDE 50 MILLIGRAM(S): 50 TABLET ORAL at 21:45

## 2023-12-15 RX ADMIN — Medication 50 MILLIGRAM(S): at 17:49

## 2023-12-15 RX ADMIN — CEFTRIAXONE 100 MILLIGRAM(S): 500 INJECTION, POWDER, FOR SOLUTION INTRAMUSCULAR; INTRAVENOUS at 14:04

## 2023-12-15 RX ADMIN — TRAMADOL HYDROCHLORIDE 50 MILLIGRAM(S): 50 TABLET ORAL at 13:58

## 2023-12-15 RX ADMIN — Medication 50 MILLIGRAM(S): at 05:05

## 2023-12-15 RX ADMIN — TRAMADOL HYDROCHLORIDE 50 MILLIGRAM(S): 50 TABLET ORAL at 14:58

## 2023-12-15 RX ADMIN — APIXABAN 2.5 MILLIGRAM(S): 2.5 TABLET, FILM COATED ORAL at 17:49

## 2023-12-15 RX ADMIN — TRAMADOL HYDROCHLORIDE 50 MILLIGRAM(S): 50 TABLET ORAL at 05:06

## 2023-12-15 RX ADMIN — Medication 40 MILLIGRAM(S): at 05:05

## 2023-12-15 RX ADMIN — APIXABAN 2.5 MILLIGRAM(S): 2.5 TABLET, FILM COATED ORAL at 05:05

## 2023-12-15 RX ADMIN — Medication 12 UNIT(S): at 14:03

## 2023-12-15 RX ADMIN — GABAPENTIN 100 MILLIGRAM(S): 400 CAPSULE ORAL at 12:10

## 2023-12-15 RX ADMIN — GABAPENTIN 300 MILLIGRAM(S): 400 CAPSULE ORAL at 21:43

## 2023-12-15 RX ADMIN — LEVETIRACETAM 1000 MILLIGRAM(S): 250 TABLET, FILM COATED ORAL at 05:06

## 2023-12-15 RX ADMIN — TRAMADOL HYDROCHLORIDE 50 MILLIGRAM(S): 50 TABLET ORAL at 22:15

## 2023-12-15 RX ADMIN — Medication 1 DROP(S): at 13:57

## 2023-12-15 RX ADMIN — TRAMADOL HYDROCHLORIDE 50 MILLIGRAM(S): 50 TABLET ORAL at 05:36

## 2023-12-15 RX ADMIN — Medication 1: at 09:08

## 2023-12-15 RX ADMIN — Medication 3: at 18:50

## 2023-12-15 RX ADMIN — Medication 1: at 21:44

## 2023-12-15 RX ADMIN — LEVETIRACETAM 1000 MILLIGRAM(S): 250 TABLET, FILM COATED ORAL at 17:48

## 2023-12-15 RX ADMIN — INSULIN GLARGINE 22 UNIT(S): 100 INJECTION, SOLUTION SUBCUTANEOUS at 21:43

## 2023-12-15 RX ADMIN — Medication 1 DROP(S): at 05:06

## 2023-12-15 RX ADMIN — Medication 1 DROP(S): at 21:43

## 2023-12-15 RX ADMIN — Medication 5: at 13:56

## 2023-12-15 RX ADMIN — Medication 12 UNIT(S): at 18:51

## 2023-12-15 RX ADMIN — Medication 10 UNIT(S): at 09:09

## 2023-12-15 NOTE — PROGRESS NOTE ADULT - SUBJECTIVE AND OBJECTIVE BOX
CARDIOLOGY FOLLOW UP - Dr. Alcaraz  DATE OF SERVICE: 12/15/23    CC  Events noted  Endorsing dysphagia  No cv complaints    REVIEW OF SYSTEMS:  CONSTITUTIONAL: No fever, weight loss, or fatigue  RESPIRATORY: No cough, wheezing, chills or hemoptysis; No Shortness of Breath  CARDIOVASCULAR: No chest pain, palpitations, passing out, dizziness, or leg swelling  GASTROINTESTINAL: No abdominal or epigastric pain. No nausea, vomiting, or hematemesis; No diarrhea or constipation. No melena or hematochezia.  VASCULAR: No edema     PHYSICAL EXAM:  T(C): 36.5 (12-15-23 @ 05:04), Max: 36.7 (12-14-23 @ 20:27)  HR: 56 (12-15-23 @ 05:04) (56 - 73)  BP: 121/66 (12-15-23 @ 05:04) (110/65 - 123/79)  RR: 18 (12-15-23 @ 05:04) (18 - 18)  SpO2: 98% (12-15-23 @ 05:04) (95% - 100%)  Wt(kg): --  I&O's Summary    14 Dec 2023 07:01  -  15 Dec 2023 07:00  --------------------------------------------------------  IN: 760 mL / OUT: 1800 mL / NET: -1040 mL        Appearance: Normal	  Cardiovascular: Normal S1 S2,RRR, No JVD, No murmurs  Respiratory: Lungs clear to auscultation b/l  Gastrointestinal:  Soft, Non-tender, + BS	  Extremities: Normal range of motion, No clubbing, cyanosis or edema      Home Medications:  gabapentin 100 mg oral capsule: 1 cap(s) orally once a day (in the morning) (09 Dec 2023 15:06)  gabapentin 300 mg oral capsule: 1 cap(s) orally once a day (in the evening) (09 Dec 2023 15:06)  metFORMIN 1000 mg oral tablet: 1 tab(s) orally 2 times a day (11 Dec 2023 10:23)  metoprolol tartrate 50 mg oral tablet: 1 tab(s) orally 2 times a day note: rx has 3 times per day. (11 Dec 2023 10:23)  Ozempic 2 mg/1.5 mL (0.25 mg or 0.5 mg dose) subcutaneous solution: 0.25 milligram(s) subcutaneously once a week on tuesday (11 Dec 2023 10:23)  traMADol 50 mg oral tablet: 1 tab(s) orally 3 times a day (09 Dec 2023 15:04)  Tresiba FlexTouch 100 units/mL subcutaneous solution: 14 unit(s) subcutaneous once a day (11 Dec 2023 10:23)  Vitamin B-12: 1 tab once a day (09 Dec 2023 15:04)  Vitamin D3 25 mcg (1000 intl units) oral tablet: 1 tab(s) orally once a day (09 Dec 2023 15:04)      MEDICATIONS  (STANDING):  apixaban 2.5 milliGRAM(s) Oral every 12 hours  artificial tears (preservative free) Ophthalmic Solution 1 Drop(s) Both EYES three times a day  bisacodyl Suppository 10 milliGRAM(s) Rectal once  cholecalciferol 1000 Unit(s) Oral daily  dextrose 5%. 1000 milliLiter(s) (50 mL/Hr) IV Continuous <Continuous>  dextrose 5%. 1000 milliLiter(s) (100 mL/Hr) IV Continuous <Continuous>  dextrose 50% Injectable 12.5 Gram(s) IV Push once  dextrose 50% Injectable 25 Gram(s) IV Push once  dextrose 50% Injectable 25 Gram(s) IV Push once  gabapentin 300 milliGRAM(s) Oral at bedtime  gabapentin 100 milliGRAM(s) Oral daily  glucagon  Injectable 1 milliGRAM(s) IntraMuscular once  insulin glargine Injectable (LANTUS) 22 Unit(s) SubCutaneous at bedtime  insulin lispro (ADMELOG) corrective regimen sliding scale   SubCutaneous three times a day before meals  insulin lispro (ADMELOG) corrective regimen sliding scale   SubCutaneous at bedtime  insulin lispro Injectable (ADMELOG) 10 Unit(s) SubCutaneous three times a day before meals  levETIRAcetam 1000 milliGRAM(s) Oral two times a day  metoprolol tartrate 50 milliGRAM(s) Oral two times a day  predniSONE   Tablet 40 milliGRAM(s) Oral daily  traMADol 50 milliGRAM(s) Oral three times a day      TELEMETRY: 	    ECG:  	  RADIOLOGY:   DIAGNOSTIC TESTING:  [ ] Echocardiogram:  [ ]  Catheterization:  [ ] Stress Test:    OTHER: 	    LABS:	 	                            11.5   6.74  )-----------( 304      ( 14 Dec 2023 07:14 )             35.0     12-14    137  |  100  |  38<H>  ----------------------------<  268<H>  3.1<L>   |  24  |  1.01    Ca    9.3      14 Dec 2023 07:14    TPro  6.0  /  Alb  3.3  /  TBili  3.2<H>  /  DBili  2.2<H>  /  AST  681<H>  /  ALT  597<H>  /  AlkPhos  891<H>  12-14    PT/INR - ( 14 Dec 2023 07:14 )   PT: 14.2 sec;   INR: 1.36 ratio         PTT - ( 14 Dec 2023 07:14 )  PTT:32.9 sec

## 2023-12-15 NOTE — PROGRESS NOTE ADULT - SUBJECTIVE AND OBJECTIVE BOX
Progress Note   Shakila Hemphill PGY4- GI/Hep    SUBJECTIVE: Patient seen at bedside.     OBJECTIVE:    VITAL SIGNS:  ICU Vital Signs Last 24 Hrs  T(C): 36.7 (15 Dec 2023 12:44), Max: 36.7 (14 Dec 2023 20:27)  T(F): 98 (15 Dec 2023 12:44), Max: 98 (14 Dec 2023 20:27)  HR: 60 (15 Dec 2023 12:44) (56 - 73)  BP: 114/56 (15 Dec 2023 12:44) (96/56 - 123/79)  BP(mean): --  ABP: --  ABP(mean): --  RR: 18 (15 Dec 2023 12:44) (18 - 18)  SpO2: 95% (15 Dec 2023 12:44) (95% - 100%)    O2 Parameters below as of 15 Dec 2023 12:44  Patient On (Oxygen Delivery Method): room air              12-14 @ 07:01  -  12-15 @ 07:00  --------------------------------------------------------  IN: 760 mL / OUT: 1800 mL / NET: -1040 mL    12-15 @ 07:01  -  12-15 @ 13:17  --------------------------------------------------------  IN: 340 mL / OUT: 300 mL / NET: 40 mL        MEDICATIONS:  MEDICATIONS  (STANDING):  apixaban 2.5 milliGRAM(s) Oral every 12 hours  artificial tears (preservative free) Ophthalmic Solution 1 Drop(s) Both EYES three times a day  bisacodyl Suppository 10 milliGRAM(s) Rectal once  cefTRIAXone   IVPB 1000 milliGRAM(s) IV Intermittent every 24 hours  cholecalciferol 1000 Unit(s) Oral daily  dextrose 5%. 1000 milliLiter(s) (50 mL/Hr) IV Continuous <Continuous>  dextrose 5%. 1000 milliLiter(s) (100 mL/Hr) IV Continuous <Continuous>  dextrose 50% Injectable 12.5 Gram(s) IV Push once  dextrose 50% Injectable 25 Gram(s) IV Push once  dextrose 50% Injectable 25 Gram(s) IV Push once  gabapentin 300 milliGRAM(s) Oral at bedtime  gabapentin 100 milliGRAM(s) Oral daily  glucagon  Injectable 1 milliGRAM(s) IntraMuscular once  insulin glargine Injectable (LANTUS) 22 Unit(s) SubCutaneous at bedtime  insulin lispro (ADMELOG) corrective regimen sliding scale   SubCutaneous at bedtime  insulin lispro (ADMELOG) corrective regimen sliding scale   SubCutaneous three times a day before meals  insulin lispro Injectable (ADMELOG) 10 Unit(s) SubCutaneous three times a day before meals  levETIRAcetam 1000 milliGRAM(s) Oral two times a day  metoprolol tartrate 50 milliGRAM(s) Oral two times a day  predniSONE   Tablet 40 milliGRAM(s) Oral daily  traMADol 50 milliGRAM(s) Oral three times a day    MEDICATIONS  (PRN):  Biotene Dry Mouth Oral Rinse 5 milliLiter(s) Swish and Spit four times a day PRN Mouth Care  dextrose Oral Gel 15 Gram(s) Oral once PRN Blood Glucose LESS THAN 70 milliGRAM(s)/deciliter      ALLERGIES:  Allergies    No Known Drug Allergies  adhesives (Pruritus)    Intolerances        LABS:                        11.5   6.74  )-----------( 304      ( 14 Dec 2023 07:14 )             35.0     12-15    140  |  104  |  35<H>  ----------------------------<  143<H>  3.4<L>   |  23  |  1.04    Ca    9.4      15 Dec 2023 08:35    TPro  6.1  /  Alb  3.3  /  TBili  2.6<H>  /  DBili  x   /  AST  549<H>  /  ALT  548<H>  /  AlkPhos  799<H>  12-15    PT/INR - ( 14 Dec 2023 07:14 )   PT: 14.2 sec;   INR: 1.36 ratio         PTT - ( 14 Dec 2023 07:14 )  PTT:32.9 sec  Urinalysis Basic - ( 15 Dec 2023 08:35 )    Color: x / Appearance: x / SG: x / pH: x  Gluc: 143 mg/dL / Ketone: x  / Bili: x / Urobili: x   Blood: x / Protein: x / Nitrite: x   Leuk Esterase: x / RBC: x / WBC x   Sq Epi: x / Non Sq Epi: x / Bacteria: x

## 2023-12-15 NOTE — PROGRESS NOTE ADULT - ASSESSMENT
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   ·  Plan: - ddx includes DILI 2/2 Keytruda vs less likely Keppra as has been on for a year vs AI vs less likely ischemic   - No longer on Keytruda  - US unremarkable   - Order AI w/u  - MR Abdomen w/ IV   - acute viral hepatitis panel, plus HBV PCR, HCV PCR, HEV IgM/IgG  - ASMA, AMA, LKM  - full infectious workup  - UTox   follow up with oncology re: alternatives to Keytruda for further treatment; if no alternatives, can consider liver biopsy to confirm diagnosis; however, if alternatives available, then would defer bx as liver numbers are improving  - trend LFTs  -  follow up with oncology re: alternatives to Keytruda for further treatment; if no alternatives, can consider liver biopsy to confirm diagnosis; however, if alternatives available, then would defer bx as liver numbers are improving  - BCx NGTD  - plan as per hepatology    dysphagia  - follow up mbs    Chronic atrial fibrillation.   ·  Plan: - On Eliquis 2.5mg BID, continue   - c/w Lopressor 50mg BID.     Type 2 diabetes mellitus.   -  AM A1c 7.8  - SALAS for now  - CC diet  - Diabetic education.    HTN (hypertension).   ·  Plan: - c/w home medications w/ hold parameters.    HLD (hyperlipidemia).   ·  Plan: - No statin given elevated liver enzymes.     CAD (coronary artery disease).   ·  Plan: - On Eliquis.    Primary lung cancer with metastasis from lung to other site.   ·  Plan: - Holding Keytruda  - c/w Keppra for brain mets.     Prophylactic measure.   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   ·  Plan: - ddx includes DILI 2/2 Keytruda vs less likely Keppra as has been on for a year vs AI vs less likely ischemic   - No longer on Keytruda  - US unremarkable   - Order AI w/u  - MR Abdomen w/ IV   - acute viral hepatitis panel, plus HBV PCR, HCV PCR, HEV IgM/IgG  - ASMA, AMA, LKM  - full infectious workup  - UTox   follow up with oncology re: alternatives to Keytruda for further treatment; if no alternatives, can consider liver biopsy to confirm diagnosis; however, if alternatives available, then would defer bx as liver numbers are improving  - trend LFTs  -  follow up with oncology re: alternatives to Keytruda for further treatment; if no alternatives, can consider liver biopsy to confirm diagnosis; however, if alternatives available, then would defer bx as liver numbers are improving  - BCx NGTD  - plan as per hepatology    dysphagia  - follow up mbs    Chronic atrial fibrillation.   ·  Plan: - On Eliquis 2.5mg BID, continue   - c/w Lopressor 50mg BID.     Type 2 diabetes mellitus.   -  AM A1c 7.8  - SALAS for now  - CC diet  - Diabetic education.    HTN (hypertension).   ·  Plan: - c/w home medications w/ hold parameters.    HLD (hyperlipidemia).   ·  Plan: - No statin given elevated liver enzymes.     CAD (coronary artery disease).   ·  Plan: - On Eliquis.    Primary lung cancer with metastasis from lung to other site.   ·  Plan: - Holding Keytruda  - c/w Keppra for brain mets.     Prophylactic measure.   ·  Plan: DVT ppx: On Eliquis  Diet: DASH/CC  Dispo: can be discharged if cleared by hepatology

## 2023-12-15 NOTE — PROGRESS NOTE ADULT - SUBJECTIVE AND OBJECTIVE BOX
Chief complaint  Patient is a 83y old  Female who presents with a chief complaint of Elevated liver enzymes (15 Dec 2023 13:13)         Labs and Fingersticks  CAPILLARY BLOOD GLUCOSE      POCT Blood Glucose.: 354 mg/dL (15 Dec 2023 13:36)  POCT Blood Glucose.: 165 mg/dL (15 Dec 2023 09:01)  POCT Blood Glucose.: 239 mg/dL (14 Dec 2023 21:12)  POCT Blood Glucose.: 316 mg/dL (14 Dec 2023 18:02)  POCT Blood Glucose.: 438 mg/dL (14 Dec 2023 14:30)      Anion Gap: 13 (12-15 @ 08:35)  Anion Gap: 13 (12-14 @ 07:14)      Calcium: 9.4 (12-15 @ 08:35)  Calcium: 9.3 (12-14 @ 07:14)  Albumin: 3.3 (12-15 @ 08:35)  Albumin: 3.3 (12-14 @ 07:14)    Alanine Aminotransferase (ALT/SGPT): 548 *H* (12-15 @ 08:35)  Alanine Aminotransferase (ALT/SGPT): 597 *H* (12-14 @ 07:14)  Alkaline Phosphatase: 799 *H* (12-15 @ 08:35)  Alkaline Phosphatase: 891 *H* (12-14 @ 07:14)  Aspartate Aminotransferase (AST/SGOT): 549 *H* (12-15 @ 08:35)  Aspartate Aminotransferase (AST/SGOT): 681 *H* (12-14 @ 07:14)        12-15    140  |  104  |  35<H>  ----------------------------<  143<H>  3.4<L>   |  23  |  1.04    Ca    9.4      15 Dec 2023 08:35    TPro  6.1  /  Alb  3.3  /  TBili  2.6<H>  /  DBili  x   /  AST  549<H>  /  ALT  548<H>  /  AlkPhos  799<H>  12-15                        11.5   6.74  )-----------( 304      ( 14 Dec 2023 07:14 )             35.0     Medications  MEDICATIONS  (STANDING):  apixaban 2.5 milliGRAM(s) Oral every 12 hours  artificial tears (preservative free) Ophthalmic Solution 1 Drop(s) Both EYES three times a day  bisacodyl Suppository 10 milliGRAM(s) Rectal once  cefTRIAXone   IVPB 1000 milliGRAM(s) IV Intermittent every 24 hours  cholecalciferol 1000 Unit(s) Oral daily  dextrose 5%. 1000 milliLiter(s) (50 mL/Hr) IV Continuous <Continuous>  dextrose 5%. 1000 milliLiter(s) (100 mL/Hr) IV Continuous <Continuous>  dextrose 50% Injectable 12.5 Gram(s) IV Push once  dextrose 50% Injectable 25 Gram(s) IV Push once  dextrose 50% Injectable 25 Gram(s) IV Push once  gabapentin 100 milliGRAM(s) Oral daily  gabapentin 300 milliGRAM(s) Oral at bedtime  glucagon  Injectable 1 milliGRAM(s) IntraMuscular once  insulin glargine Injectable (LANTUS) 22 Unit(s) SubCutaneous at bedtime  insulin lispro (ADMELOG) corrective regimen sliding scale   SubCutaneous three times a day before meals  insulin lispro (ADMELOG) corrective regimen sliding scale   SubCutaneous at bedtime  insulin lispro Injectable (ADMELOG) 12 Unit(s) SubCutaneous three times a day before meals  levETIRAcetam 1000 milliGRAM(s) Oral two times a day  metoprolol tartrate 50 milliGRAM(s) Oral two times a day  predniSONE   Tablet 40 milliGRAM(s) Oral daily  traMADol 50 milliGRAM(s) Oral three times a day      Physical Exam  General: Patient comfortable in bed   Vital Signs Last 12 Hrs  T(F): 98 (12-15-23 @ 12:44), Max: 98 (12-15-23 @ 12:44)  HR: 60 (12-15-23 @ 12:44) (56 - 63)  BP: 114/56 (12-15-23 @ 12:44) (96/56 - 121/66)  BP(mean): --  RR: 18 (12-15-23 @ 12:44) (18 - 18)  SpO2: 95% (12-15-23 @ 12:44) (95% - 98%)    CVS: S1S2   Respiratory: No wheezing, no crepitations  GI: Abdomen soft, bowel sounds positive  Musculoskeletal:  moves all extremities       Chief complaint  Patient is a 83y old  Female who presents with a chief complaint of Elevated liver enzymes (15 Dec 2023 13:13)         Labs and Fingersticks  CAPILLARY BLOOD GLUCOSE      POCT Blood Glucose.: 354 mg/dL (15 Dec 2023 13:36)  POCT Blood Glucose.: 165 mg/dL (15 Dec 2023 09:01)  POCT Blood Glucose.: 239 mg/dL (14 Dec 2023 21:12)  POCT Blood Glucose.: 316 mg/dL (14 Dec 2023 18:02)  POCT Blood Glucose.: 438 mg/dL (14 Dec 2023 14:30)      Anion Gap: 13 (12-15 @ 08:35)  Anion Gap: 13 (12-14 @ 07:14)      Calcium: 9.4 (12-15 @ 08:35)  Calcium: 9.3 (12-14 @ 07:14)  Albumin: 3.3 (12-15 @ 08:35)  Albumin: 3.3 (12-14 @ 07:14)    Alanine Aminotransferase (ALT/SGPT): 548 *H* (12-15 @ 08:35)  Alanine Aminotransferase (ALT/SGPT): 597 *H* (12-14 @ 07:14)  Alkaline Phosphatase: 799 *H* (12-15 @ 08:35)  Alkaline Phosphatase: 891 *H* (12-14 @ 07:14)  Aspartate Aminotransferase (AST/SGOT): 549 *H* (12-15 @ 08:35)  Aspartate Aminotransferase (AST/SGOT): 681 *H* (12-14 @ 07:14)        12-15    140  |  104  |  35<H>  ----------------------------<  143<H>  3.4<L>   |  23  |  1.04    Ca    9.4      15 Dec 2023 08:35    TPro  6.1  /  Alb  3.3  /  TBili  2.6<H>  /  DBili  x   /  AST  549<H>  /  ALT  548<H>  /  AlkPhos  799<H>  12-15                        11.5   6.74  )-----------( 304      ( 14 Dec 2023 07:14 )             35.0     Medications  MEDICATIONS  (STANDING):  apixaban 2.5 milliGRAM(s) Oral every 12 hours  artificial tears (preservative free) Ophthalmic Solution 1 Drop(s) Both EYES three times a day  bisacodyl Suppository 10 milliGRAM(s) Rectal once  cefTRIAXone   IVPB 1000 milliGRAM(s) IV Intermittent every 24 hours  cholecalciferol 1000 Unit(s) Oral daily  dextrose 5%. 1000 milliLiter(s) (50 mL/Hr) IV Continuous <Continuous>  dextrose 5%. 1000 milliLiter(s) (100 mL/Hr) IV Continuous <Continuous>  dextrose 50% Injectable 12.5 Gram(s) IV Push once  dextrose 50% Injectable 25 Gram(s) IV Push once  dextrose 50% Injectable 25 Gram(s) IV Push once  gabapentin 100 milliGRAM(s) Oral daily  gabapentin 300 milliGRAM(s) Oral at bedtime  glucagon  Injectable 1 milliGRAM(s) IntraMuscular once  insulin glargine Injectable (LANTUS) 22 Unit(s) SubCutaneous at bedtime  insulin lispro (ADMELOG) corrective regimen sliding scale   SubCutaneous three times a day before meals  insulin lispro (ADMELOG) corrective regimen sliding scale   SubCutaneous at bedtime  insulin lispro Injectable (ADMELOG) 12 Unit(s) SubCutaneous three times a day before meals  levETIRAcetam 1000 milliGRAM(s) Oral two times a day  metoprolol tartrate 50 milliGRAM(s) Oral two times a day  predniSONE   Tablet 40 milliGRAM(s) Oral daily  traMADol 50 milliGRAM(s) Oral three times a day      Physical Exam  General: Patient comfortable in bed   Vital Signs Last 12 Hrs  T(F): 98 (12-15-23 @ 12:44), Max: 98 (12-15-23 @ 12:44)  HR: 60 (12-15-23 @ 12:44) (56 - 63)  BP: 114/56 (12-15-23 @ 12:44) (96/56 - 121/66)  BP(mean): --  RR: 18 (12-15-23 @ 12:44) (18 - 18)  SpO2: 95% (12-15-23 @ 12:44) (95% - 98%)    CVS: S1S2   Respiratory: No wheezing, no crepitations  GI: Abdomen soft, bowel sounds positive  Musculoskeletal:  moves all extremities

## 2023-12-15 NOTE — PROGRESS NOTE ADULT - ASSESSMENT
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 Previously seen by our service. Endocrine consulted for hyperglycemias  Assessment  DMT2: 83y Female with DM T2 with hyperglycemia, A1C 7.8%, was on oral meds and insulin at home, now on basal bolus insulin with coverage, blood sugars running high. On  prednisone PO, having steroid  induced hyperglycemias.   HTN: on antihypertensive medications, monitored, asymptomatic.  Obesity: No strict exercise routines, not on any weight loss program, neither on low calorie diet. 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 Previously seen by our service. Endocrine consulted for hyperglycemias  Assessment  DMT2: 83y Female with DM T2 with hyperglycemia, A1C 7.8%, was on oral meds and insulin at home, now on basal bolus insulin with coverage, blood sugars running high. On  prednisone PO, having steroid  induced hyperglycemias.   HTN: on antihypertensive medications, monitored, asymptomatic.  Obesity: No strict exercise routines, not on any weight loss program, neither on low calorie diet. 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 Previously seen by our service. Endocrine consulted for hyperglycemias  Assessment  DMT2: 83y Female with DM T2 with hyperglycemia, A1C 7.8%, was on oral meds and insulin at home, now on basal bolus  insulin with coverage, blood sugars running high. On  prednisone PO, having steroid  induced hyperglycemias.   HTN: on antihypertensive medications, monitored, asymptomatic.  Obesity: No strict exercise routines, not on any weight loss program, neither on low calorie diet. On Home GLP1-a      Discussed plan and management wit Dr La Ivey NP-TEAMS  Marsha Tovar MD  Cell: [phone]  Office: [phone]               84yo F w/ PMH of HTN, HLD, DM2, CAD c/b MI, Afib on Eliquis, PAD, metastatic lung CA w/ known brain mets s/p craniectomy previously on Keytruda pw elevated liver enzymes. Previously seen by our service. Endocrine consulted for hyperglycemias  Assessment  DMT2: 83y Female with DM T2 with hyperglycemia, A1C 7.8%, was on oral meds and insulin at home, now on basal bolus  insulin with coverage, blood sugars running high. On  prednisone PO, having steroid  induced hyperglycemias.   HTN: on antihypertensive medications, monitored, asymptomatic.  Obesity: No strict exercise routines, not on any weight loss program, neither on low calorie diet. On Home GLP1-a      Discussed plan and management wit Dr La Ivey NP-TEAMS  Marsha Tovar MD  Cell: [phone]  Office: [phone]

## 2023-12-15 NOTE — PROGRESS NOTE ADULT - ASSESSMENT
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 autoimmune or viral hepatitis vs. less likely ischemic hepatitis  #coagulopathy, INR 1.47 (on Eliquis at home)  #A&Ox3; no asterixis  - AST/ALT 1400/900; TB 5.9; ALP 1300s on presentation; previously AST/ALT 400s/300s (11/9/23); and prior to that, numbers were normal  - last dose of Keytruda 11/9/23  - recent outpatient workup: negative HAV IgG, EBV PCR, CMV PCR, ANNETTE, IGG, M2 Ab  - US Abd 11/16 normal liver; CBD 4mm  - no alcohol, Tylenol use, or herbal supplement usage  - acute hepatitis panel negative    Recommendations  - patient is responding well to 40mg prednisone for DILI, will continue with 40mg prednisone upon discharge   - will hold off on biopsy and have patient follow up with hepatology outpatient   - follow up with oncology re: alternatives to Keytruda for further treatment  - trend CMP and INR daily     Hepatology team to sign off.   Thank you for inviting us to participate in this patient's care. Please call back with any further questions or concerns.     Follow up in Hepatology Clinic: [phone] ([facility] at [address] [place] [address]) or [phone] ([facility] at [address] [place] [address]) or [phone] ([facility] at [address] [place] [address])    Note and recommendations are incomplete until reviewed and attested by attending.    Shakila Smyth MD  Gastroenterology/Hepatology Fellow, PGY-4  Please contact via TEAMS    NON-URGENT CONSULTS:  Please email [email or web address]  [email or web address]  AT NIGHT AND ON WEEKENDS:  Contact on-call GI fellow via answering service ([phone]) from 5pm-8am and on weekends/holidays  MONDAY-FRIDAY 8AM-5PM:  Pager# [phone]   83F with hx of hypertension, hyperlipidemia, NSTEMI, PAD, A-fib on Eliquis, DM, metastatic lung CA with known brain mets s/p craniectomy/resection/radiation therapy followed by Keytruda (last dose >3 weeks ago; stopped due to suspected hepatotoxicity) presenting with worsening LFT elevation    Impression  #hepatocellular injury; DDx include DILI 2/2 Keytruda vs. autoimmune or viral hepatitis vs. less likely ischemic hepatitis  #coagulopathy, INR 1.47 (on Eliquis at home)  #A&Ox3; no asterixis  - AST/ALT 1400/900; TB 5.9; ALP 1300s on presentation; previously AST/ALT 400s/300s (11/9/23); and prior to that, numbers were normal  - last dose of Keytruda 11/9/23  - recent outpatient workup: negative HAV IgG, EBV PCR, CMV PCR, ANNETTE, IGG, M2 Ab  - US Abd 11/16 normal liver; CBD 4mm  - no alcohol, Tylenol use, or herbal supplement usage  - acute hepatitis panel negative    Recommendations  - patient is responding well to 40mg prednisone for DILI, will continue with 40mg prednisone upon discharge   - will hold off on biopsy and have patient follow up with hepatology outpatient   - follow up with oncology re: alternatives to Keytruda for further treatment  - trend CMP and INR daily     Hepatology team to sign off.   Thank you for inviting us to participate in this patient's care. Please call back with any further questions or concerns.     Follow up in Hepatology Clinic: [phone] ([facility] at [address] [place] [address]) or [phone] ([facility] at [address] [place] [address]) or [phone] ([facility] at [address] [place] [address])    Note and recommendations are incomplete until reviewed and attested by attending.    Shakila Smyth MD  Gastroenterology/Hepatology Fellow, PGY-4  Please contact via TEAMS    NON-URGENT CONSULTS:  Please email [email or web address]  AT NIGHT AND ON WEEKENDS:  Contact on-call GI fellow via answering service ([phone]) from 5pm-8am and on weekends/holidays  MONDAY-FRIDAY 8AM-5PM:  Pager# [phone]

## 2023-12-15 NOTE — SWALLOW VFSS/MBS ASSESSMENT ADULT - NS SWALLOW VFSS REC ASPIR MON
Monitor for s/s aspiration/laryngeal penetration. If noted:  D/C p.o. intake, provide non-oral nutrition/hydration/meds, and contact this service @ [phone]/change of breathing pattern/cough/gurgly voice/fever/pneumonia/throat clearing/upper respiratory infection Monitor for s/s aspiration/laryngeal penetration. If noted:  D/C p.o. intake, provide non-oral nutrition/hydration/meds, and contact this service @ [phone]/change of breathing pattern/cough/gurgly voice/fever/pneumonia/throat clearing/upper respiratory infection

## 2023-12-15 NOTE — PROGRESS NOTE ADULT - SUBJECTIVE AND OBJECTIVE BOX
DATE OF SERVICE: 12-15-23 @ 11:40    Patient is a 83y old  Female who presents with a chief complaint of Elevated liver enzymes (15 Dec 2023 08:29)      SUBJECTIVE / OVERNIGHT EVENTS:  No chest pain. No shortness of breath. No complaints. No events overnight.     MEDICATIONS  (STANDING):  apixaban 2.5 milliGRAM(s) Oral every 12 hours  artificial tears (preservative free) Ophthalmic Solution 1 Drop(s) Both EYES three times a day  bisacodyl Suppository 10 milliGRAM(s) Rectal once  cholecalciferol 1000 Unit(s) Oral daily  dextrose 5%. 1000 milliLiter(s) (50 mL/Hr) IV Continuous <Continuous>  dextrose 5%. 1000 milliLiter(s) (100 mL/Hr) IV Continuous <Continuous>  dextrose 50% Injectable 25 Gram(s) IV Push once  dextrose 50% Injectable 25 Gram(s) IV Push once  dextrose 50% Injectable 12.5 Gram(s) IV Push once  gabapentin 300 milliGRAM(s) Oral at bedtime  gabapentin 100 milliGRAM(s) Oral daily  glucagon  Injectable 1 milliGRAM(s) IntraMuscular once  insulin glargine Injectable (LANTUS) 22 Unit(s) SubCutaneous at bedtime  insulin lispro (ADMELOG) corrective regimen sliding scale   SubCutaneous at bedtime  insulin lispro (ADMELOG) corrective regimen sliding scale   SubCutaneous three times a day before meals  insulin lispro Injectable (ADMELOG) 10 Unit(s) SubCutaneous three times a day before meals  levETIRAcetam 1000 milliGRAM(s) Oral two times a day  metoprolol tartrate 50 milliGRAM(s) Oral two times a day  predniSONE   Tablet 40 milliGRAM(s) Oral daily  traMADol 50 milliGRAM(s) Oral three times a day    MEDICATIONS  (PRN):  Biotene Dry Mouth Oral Rinse 5 milliLiter(s) Swish and Spit four times a day PRN Mouth Care  dextrose Oral Gel 15 Gram(s) Oral once PRN Blood Glucose LESS THAN 70 milliGRAM(s)/deciliter      Vital Signs Last 24 Hrs  T(C): 36.4 (15 Dec 2023 10:04), Max: 36.7 (14 Dec 2023 20:27)  T(F): 97.5 (15 Dec 2023 10:04), Max: 98 (14 Dec 2023 20:27)  HR: 63 (15 Dec 2023 10:04) (56 - 73)  BP: 96/56 (15 Dec 2023 10:04) (96/56 - 123/79)  BP(mean): --  RR: 18 (15 Dec 2023 10:04) (18 - 18)  SpO2: 97% (15 Dec 2023 10:04) (96% - 100%)    Parameters below as of 15 Dec 2023 10:04  Patient On (Oxygen Delivery Method): room air      CAPILLARY BLOOD GLUCOSE      POCT Blood Glucose.: 165 mg/dL (15 Dec 2023 09:01)  POCT Blood Glucose.: 239 mg/dL (14 Dec 2023 21:12)  POCT Blood Glucose.: 316 mg/dL (14 Dec 2023 18:02)  POCT Blood Glucose.: 438 mg/dL (14 Dec 2023 14:30)    I&O's Summary    14 Dec 2023 07:01  -  15 Dec 2023 07:00  --------------------------------------------------------  IN: 760 mL / OUT: 1800 mL / NET: -1040 mL    15 Dec 2023 07:01  -  15 Dec 2023 11:40  --------------------------------------------------------  IN: 340 mL / OUT: 300 mL / NET: 40 mL        PHYSICAL EXAM:  GENERAL: NAD, well-developed  HEAD:  Atraumatic, Normocephalic  EYES: EOMI, PERRLA, conjunctiva and sclera clear  NECK: Supple, No JVD  CHEST/LUNG: Clear to auscultation bilaterally; No wheeze  HEART: Regular rate and rhythm; No murmurs, rubs, or gallops  ABDOMEN: Soft, Nontender, Nondistended; Bowel sounds present  EXTREMITIES:  2+ Peripheral Pulses, No clubbing, cyanosis, or edema  PSYCH: AAOx3  NEUROLOGY: non-focal  SKIN: No rashes or lesions    LABS:                        11.5   6.74  )-----------( 304      ( 14 Dec 2023 07:14 )             35.0     12-15    140  |  104  |  35<H>  ----------------------------<  143<H>  3.4<L>   |  23  |  1.04    Ca    9.4      15 Dec 2023 08:35    TPro  6.1  /  Alb  3.3  /  TBili  2.6<H>  /  DBili  x   /  AST  549<H>  /  ALT  548<H>  /  AlkPhos  799<H>  12-15    PT/INR - ( 14 Dec 2023 07:14 )   PT: 14.2 sec;   INR: 1.36 ratio         PTT - ( 14 Dec 2023 07:14 )  PTT:32.9 sec      Urinalysis Basic - ( 15 Dec 2023 08:35 )    Color: x / Appearance: x / SG: x / pH: x  Gluc: 143 mg/dL / Ketone: x  / Bili: x / Urobili: x   Blood: x / Protein: x / Nitrite: x   Leuk Esterase: x / RBC: x / WBC x   Sq Epi: x / Non Sq Epi: x / Bacteria: x        RADIOLOGY & ADDITIONAL TESTS:    Imaging Personally Reviewed:    Consultant(s) Notes Reviewed:      Care Discussed with Consultants/Other Providers:

## 2023-12-15 NOTE — SWALLOW VFSS/MBS ASSESSMENT ADULT - DIAGNOSTIC IMPRESSIONS
84 yo F with lung ca, brain mets s/p crani, s/p chemo/rad and immunotherapy; admitted for elevated liver enzymes; h/o R vocal cord paralysis dx 1/2023; pt c/o instances of "food getting stuck" x1 month; choking episode yesterday, reportedly resolved with regurgitation; also c/o dry mouth/throat and "burning sensation" in throat. Patient presents on MBS with grossly functional oropharyngeal swallow. There is adequate oral management across textures trialed. There is a delayed pharyngeal trigger and trace laryngeal penetration of puree and thin liquids which may be considered normal with aging. Material is retrieved during normal course of swallowing. There is no evidence of significant pharyngeal stasis; no e/o aspiration. A barium tablet given with water courses unimpeded through the UES.

## 2023-12-15 NOTE — PROGRESS NOTE ADULT - ASSESSMENT
ECHO 10/3/22: EF 40-45%. Akinesis of the inferior with hypokinesis of the inferolateral, septum and anterolateral hypokinetic.    A/p  83y  woman with a PMHx significant for HTN, DM, HLD, CAD, MI, PAD, peripheral neuropathy, former smoker, NSCLC with ANTHONY primary tumor and brain metastasis s/p right craniotomy & resection on 7/27/22 and post-op RT on active treatment, STEMI (10/2022), afib on eliquis, hx of RML PE presenting with elevated lfts     #elevated lfts   -work up per hepatology  -? 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   -s/p right craniotomy for resection of brain mass 7/27/22  -Heme/onc eval noted     #history of PE - dx in AUG 2022   -cont ac    #Dysphagia  -F/u MBS        d/w acp

## 2023-12-15 NOTE — SWALLOW VFSS/MBS ASSESSMENT ADULT - COMMENTS
Per MD, hepatocellular injury. ddx includes DILI 2/2 Keytruda vs less likely Keppra as has been on for a year vs AI vs less likely ischemic   Primary lung cancer with metastasis from lung to other site. On keppra for brain mets.    CXR 12/09: no acute findings    Swallow hx: Known to this service. Seen for MBS 7/22/22 during admission for dx brain mass, craniotomy. Grossly functional oropharyngeal swallow. Recommended Regular/thin diet textures. 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 Unable to locate report in documentation. Patient states will talk with son who may have report and better recall of info.; Pt reports sensation of food getting stuck in throat, inconsistently x1 month. Reports liquid wash does not clear material, instead creates phlegm and regurgitation. Also c/o dry mouth.  Bedside swallow evaluation was completed 12/14 with no overt signs of laryngeal penetration/aspiration. MBS recommended for objective assessment.

## 2023-12-16 LAB
ALBUMIN SERPL ELPH-MCNC: 3.3 G/DL — SIGNIFICANT CHANGE UP (ref 3.3–5)
ALBUMIN SERPL ELPH-MCNC: 3.3 G/DL — SIGNIFICANT CHANGE UP (ref 3.3–5)
ALP SERPL-CCNC: 751 U/L — HIGH (ref 40–120)
ALP SERPL-CCNC: 751 U/L — HIGH (ref 40–120)
ALT FLD-CCNC: 483 U/L — HIGH (ref 10–45)
ALT FLD-CCNC: 483 U/L — HIGH (ref 10–45)
ANION GAP SERPL CALC-SCNC: 13 MMOL/L — SIGNIFICANT CHANGE UP (ref 5–17)
ANION GAP SERPL CALC-SCNC: 13 MMOL/L — SIGNIFICANT CHANGE UP (ref 5–17)
AST SERPL-CCNC: 426 U/L — HIGH (ref 10–40)
AST SERPL-CCNC: 426 U/L — HIGH (ref 10–40)
BILIRUB SERPL-MCNC: 2.2 MG/DL — HIGH (ref 0.2–1.2)
BILIRUB SERPL-MCNC: 2.2 MG/DL — HIGH (ref 0.2–1.2)
BUN SERPL-MCNC: 33 MG/DL — HIGH (ref 7–23)
BUN SERPL-MCNC: 33 MG/DL — HIGH (ref 7–23)
CALCIUM SERPL-MCNC: 9.8 MG/DL — SIGNIFICANT CHANGE UP (ref 8.4–10.5)
CALCIUM SERPL-MCNC: 9.8 MG/DL — SIGNIFICANT CHANGE UP (ref 8.4–10.5)
CHLORIDE SERPL-SCNC: 103 MMOL/L — SIGNIFICANT CHANGE UP (ref 96–108)
CHLORIDE SERPL-SCNC: 103 MMOL/L — SIGNIFICANT CHANGE UP (ref 96–108)
CO2 SERPL-SCNC: 23 MMOL/L — SIGNIFICANT CHANGE UP (ref 22–31)
CO2 SERPL-SCNC: 23 MMOL/L — SIGNIFICANT CHANGE UP (ref 22–31)
CREAT SERPL-MCNC: 1.05 MG/DL — SIGNIFICANT CHANGE UP (ref 0.5–1.3)
CREAT SERPL-MCNC: 1.05 MG/DL — SIGNIFICANT CHANGE UP (ref 0.5–1.3)
EGFR: 53 ML/MIN/1.73M2 — LOW
EGFR: 53 ML/MIN/1.73M2 — LOW
GLUCOSE BLDC GLUCOMTR-MCNC: 169 MG/DL — HIGH (ref 70–99)
GLUCOSE BLDC GLUCOMTR-MCNC: 169 MG/DL — HIGH (ref 70–99)
GLUCOSE BLDC GLUCOMTR-MCNC: 255 MG/DL — HIGH (ref 70–99)
GLUCOSE BLDC GLUCOMTR-MCNC: 255 MG/DL — HIGH (ref 70–99)
GLUCOSE BLDC GLUCOMTR-MCNC: 273 MG/DL — HIGH (ref 70–99)
GLUCOSE BLDC GLUCOMTR-MCNC: 273 MG/DL — HIGH (ref 70–99)
GLUCOSE BLDC GLUCOMTR-MCNC: 389 MG/DL — HIGH (ref 70–99)
GLUCOSE BLDC GLUCOMTR-MCNC: 389 MG/DL — HIGH (ref 70–99)
GLUCOSE SERPL-MCNC: 117 MG/DL — HIGH (ref 70–99)
GLUCOSE SERPL-MCNC: 117 MG/DL — HIGH (ref 70–99)
POTASSIUM SERPL-MCNC: 3.8 MMOL/L — SIGNIFICANT CHANGE UP (ref 3.5–5.3)
POTASSIUM SERPL-MCNC: 3.8 MMOL/L — SIGNIFICANT CHANGE UP (ref 3.5–5.3)
POTASSIUM SERPL-SCNC: 3.8 MMOL/L — SIGNIFICANT CHANGE UP (ref 3.5–5.3)
POTASSIUM SERPL-SCNC: 3.8 MMOL/L — SIGNIFICANT CHANGE UP (ref 3.5–5.3)
PROT SERPL-MCNC: 6.2 G/DL — SIGNIFICANT CHANGE UP (ref 6–8.3)
PROT SERPL-MCNC: 6.2 G/DL — SIGNIFICANT CHANGE UP (ref 6–8.3)
SODIUM SERPL-SCNC: 139 MMOL/L — SIGNIFICANT CHANGE UP (ref 135–145)
SODIUM SERPL-SCNC: 139 MMOL/L — SIGNIFICANT CHANGE UP (ref 135–145)

## 2023-12-16 RX ORDER — TRAMADOL HYDROCHLORIDE 50 MG/1
50 TABLET ORAL THREE TIMES A DAY
Refills: 0 | Status: DISCONTINUED | OUTPATIENT
Start: 2023-12-16 | End: 2023-12-18

## 2023-12-16 RX ADMIN — Medication 12 UNIT(S): at 13:58

## 2023-12-16 RX ADMIN — CEFTRIAXONE 100 MILLIGRAM(S): 500 INJECTION, POWDER, FOR SOLUTION INTRAMUSCULAR; INTRAVENOUS at 13:29

## 2023-12-16 RX ADMIN — APIXABAN 2.5 MILLIGRAM(S): 2.5 TABLET, FILM COATED ORAL at 17:39

## 2023-12-16 RX ADMIN — Medication 1: at 09:33

## 2023-12-16 RX ADMIN — TRAMADOL HYDROCHLORIDE 50 MILLIGRAM(S): 50 TABLET ORAL at 06:29

## 2023-12-16 RX ADMIN — Medication 40 MILLIGRAM(S): at 05:40

## 2023-12-16 RX ADMIN — LEVETIRACETAM 1000 MILLIGRAM(S): 250 TABLET, FILM COATED ORAL at 17:38

## 2023-12-16 RX ADMIN — Medication 12 UNIT(S): at 17:40

## 2023-12-16 RX ADMIN — LEVETIRACETAM 1000 MILLIGRAM(S): 250 TABLET, FILM COATED ORAL at 05:39

## 2023-12-16 RX ADMIN — Medication 1 DROP(S): at 13:26

## 2023-12-16 RX ADMIN — TRAMADOL HYDROCHLORIDE 50 MILLIGRAM(S): 50 TABLET ORAL at 06:08

## 2023-12-16 RX ADMIN — TRAMADOL HYDROCHLORIDE 50 MILLIGRAM(S): 50 TABLET ORAL at 21:14

## 2023-12-16 RX ADMIN — Medication 1 DROP(S): at 05:39

## 2023-12-16 RX ADMIN — Medication 3: at 13:28

## 2023-12-16 RX ADMIN — Medication 1000 UNIT(S): at 13:27

## 2023-12-16 RX ADMIN — GABAPENTIN 100 MILLIGRAM(S): 400 CAPSULE ORAL at 13:26

## 2023-12-16 RX ADMIN — Medication 5: at 17:39

## 2023-12-16 RX ADMIN — TRAMADOL HYDROCHLORIDE 50 MILLIGRAM(S): 50 TABLET ORAL at 13:27

## 2023-12-16 RX ADMIN — APIXABAN 2.5 MILLIGRAM(S): 2.5 TABLET, FILM COATED ORAL at 06:08

## 2023-12-16 RX ADMIN — INSULIN GLARGINE 22 UNIT(S): 100 INJECTION, SOLUTION SUBCUTANEOUS at 21:18

## 2023-12-16 RX ADMIN — Medication 1: at 21:19

## 2023-12-16 RX ADMIN — Medication 50 MILLIGRAM(S): at 17:39

## 2023-12-16 RX ADMIN — GABAPENTIN 300 MILLIGRAM(S): 400 CAPSULE ORAL at 21:13

## 2023-12-16 RX ADMIN — Medication 50 MILLIGRAM(S): at 05:40

## 2023-12-16 RX ADMIN — Medication 12 UNIT(S): at 09:33

## 2023-12-16 RX ADMIN — Medication 1 DROP(S): at 21:13

## 2023-12-16 NOTE — PROGRESS NOTE ADULT - ASSESSMENT
84yo F w/ PMH of HTN, HLD, DM2, CAD c/b MI, Afib on Eliquis, PAD, metastatic lung CA w/ known brain mets s/p craniectomy previously on Keytruda pw elevated liver enzymes noted on outpt labs    UTI  - ceftriaxone x 3 days     Hepatocellular injury.   ·  Plan: - ddx includes DILI 2/2 Keytruda vs less likely Keppra as has been on for a year vs AI vs less likely ischemic   - No longer on Keytruda  - US unremarkable   - Order AI w/u  - MR Abdomen w/ IV   - acute viral hepatitis panel, plus HBV PCR, HCV PCR, HEV IgM/IgG  - ASMA, AMA, LKM  - full infectious workup  - UTox   follow up with oncology re: alternatives to Keytruda for further treatment; if no alternatives, can consider liver biopsy to confirm diagnosis; however, if alternatives available, then would defer bx as liver numbers are improving  - trend LFTs  -  follow up with oncology re: alternatives to Keytruda for further treatment; if no alternatives, can consider liver biopsy to confirm diagnosis; however, if alternatives available, then would defer bx as liver numbers are improving  - BCx NGTD  - plan as per hepatology    dysphagia  - s/p mbs  - regular diet with thin liquids    Chronic atrial fibrillation.   ·  Plan: - On Eliquis 2.5mg BID, continue   - c/w Lopressor 50mg BID.     Type 2 diabetes mellitus.   -  AM A1c 7.8  - SALAS for now  - CC diet  - Diabetic education.    HTN (hypertension).   ·  Plan: - c/w home medications w/ hold parameters.    HLD (hyperlipidemia).   ·  Plan: - No statin given elevated liver enzymes.     CAD (coronary artery disease).   ·  Plan: - On Eliquis.    Primary lung cancer with metastasis from lung to other site.   ·  Plan: - Holding Keytruda  - c/w Keppra for brain mets.     Prophylactic measure.   ·  Plan: DVT ppx: On Eliquis  Diet: DASH/CC  Dispo: can be discharged if cleared by hepatology

## 2023-12-16 NOTE — PROGRESS NOTE ADULT - SUBJECTIVE AND OBJECTIVE BOX
DATE OF SERVICE: 12-16-23 @ 07:54    Patient is a 83y old  Female who presents with a chief complaint of Elevated liver enzymes (15 Dec 2023 13:43)      SUBJECTIVE / OVERNIGHT EVENTS:  No chest pain. No shortness of breath. No complaints. No events overnight.     MEDICATIONS  (STANDING):  apixaban 2.5 milliGRAM(s) Oral every 12 hours  artificial tears (preservative free) Ophthalmic Solution 1 Drop(s) Both EYES three times a day  bisacodyl Suppository 10 milliGRAM(s) Rectal once  cefTRIAXone   IVPB 1000 milliGRAM(s) IV Intermittent every 24 hours  cholecalciferol 1000 Unit(s) Oral daily  dextrose 5%. 1000 milliLiter(s) (50 mL/Hr) IV Continuous <Continuous>  dextrose 5%. 1000 milliLiter(s) (100 mL/Hr) IV Continuous <Continuous>  dextrose 50% Injectable 12.5 Gram(s) IV Push once  dextrose 50% Injectable 25 Gram(s) IV Push once  dextrose 50% Injectable 25 Gram(s) IV Push once  gabapentin 300 milliGRAM(s) Oral at bedtime  gabapentin 100 milliGRAM(s) Oral daily  glucagon  Injectable 1 milliGRAM(s) IntraMuscular once  insulin glargine Injectable (LANTUS) 22 Unit(s) SubCutaneous at bedtime  insulin lispro (ADMELOG) corrective regimen sliding scale   SubCutaneous three times a day before meals  insulin lispro (ADMELOG) corrective regimen sliding scale   SubCutaneous at bedtime  insulin lispro Injectable (ADMELOG) 12 Unit(s) SubCutaneous three times a day before meals  levETIRAcetam 1000 milliGRAM(s) Oral two times a day  metoprolol tartrate 50 milliGRAM(s) Oral two times a day  predniSONE   Tablet 40 milliGRAM(s) Oral daily  traMADol 50 milliGRAM(s) Oral three times a day    MEDICATIONS  (PRN):  Biotene Dry Mouth Oral Rinse 5 milliLiter(s) Swish and Spit four times a day PRN Mouth Care  dextrose Oral Gel 15 Gram(s) Oral once PRN Blood Glucose LESS THAN 70 milliGRAM(s)/deciliter      Vital Signs Last 24 Hrs  T(C): 36.8 (16 Dec 2023 05:48), Max: 36.9 (15 Dec 2023 15:43)  T(F): 98.3 (16 Dec 2023 05:48), Max: 98.4 (15 Dec 2023 15:43)  HR: 68 (16 Dec 2023 05:48) (60 - 75)  BP: 122/60 (16 Dec 2023 05:48) (96/56 - 138/70)  BP(mean): --  RR: 18 (16 Dec 2023 05:48) (18 - 18)  SpO2: 99% (16 Dec 2023 05:48) (95% - 99%)    Parameters below as of 16 Dec 2023 05:48  Patient On (Oxygen Delivery Method): room air      CAPILLARY BLOOD GLUCOSE      POCT Blood Glucose.: 262 mg/dL (15 Dec 2023 21:10)  POCT Blood Glucose.: 265 mg/dL (15 Dec 2023 18:47)  POCT Blood Glucose.: 354 mg/dL (15 Dec 2023 13:36)  POCT Blood Glucose.: 165 mg/dL (15 Dec 2023 09:01)    I&O's Summary    15 Dec 2023 07:01  -  16 Dec 2023 07:00  --------------------------------------------------------  IN: 1190 mL / OUT: 781 mL / NET: 409 mL        PHYSICAL EXAM:  GENERAL: NAD, well-developed  HEAD:  Atraumatic, Normocephalic  EYES: EOMI, PERRLA, conjunctiva and sclera clear  NECK: Supple, No JVD  CHEST/LUNG: Clear to auscultation bilaterally; No wheeze  HEART: Regular rate and rhythm; No murmurs, rubs, or gallops  ABDOMEN: Soft, Nontender, Nondistended; Bowel sounds present  EXTREMITIES:  2+ Peripheral Pulses, No clubbing, cyanosis, or edema  PSYCH: AAOx3  NEUROLOGY: non-focal  SKIN: No rashes or lesions    LABS:    12-16    139  |  103  |  33<H>  ----------------------------<  117<H>  3.8   |  23  |  1.05    Ca    9.8      16 Dec 2023 06:47    TPro  6.2  /  Alb  3.3  /  TBili  2.2<H>  /  DBili  x   /  AST  426<H>  /  ALT  483<H>  /  AlkPhos  751<H>  12-16          Urinalysis Basic - ( 16 Dec 2023 06:47 )    Color: x / Appearance: x / SG: x / pH: x  Gluc: 117 mg/dL / Ketone: x  / Bili: x / Urobili: x   Blood: x / Protein: x / Nitrite: x   Leuk Esterase: x / RBC: x / WBC x   Sq Epi: x / Non Sq Epi: x / Bacteria: x        RADIOLOGY & ADDITIONAL TESTS:    Imaging Personally Reviewed:    Consultant(s) Notes Reviewed:      Care Discussed with Consultants/Other Providers:

## 2023-12-16 NOTE — PROGRESS NOTE ADULT - ASSESSMENT
82yo F w/ PMH of HTN, HLD, DM2, CAD c/b MI, Afib on Eliquis, PAD, metastatic lung CA w/ known brain mets s/p craniectomy previously on Keytruda pw elevated liver enzymes. Previously seen by our service. Endocrine consulted for hyperglycemias  Assessment  DMT2: 83y Female with DM T2 with hyperglycemia, A1C 7.8%, was on oral meds and insulin at home, now on basal bolus insulin with coverage, blood sugars are elevated but stable, on high dose steroids,    HTN: on antihypertensive medications, monitored, asymptomatic.  Obesity: No strict exercise routines, not on any weight loss program, neither on low calorie diet. On Home GLP1-a        Marsha Tovar MD  Cell: [phone]  Office: [phone]               84yo F w/ PMH of HTN, HLD, DM2, CAD c/b MI, Afib on Eliquis, PAD, metastatic lung CA w/ known brain mets s/p craniectomy previously on Keytruda pw elevated liver enzymes. Previously seen by our service. Endocrine consulted for hyperglycemias  Assessment  DMT2: 83y Female with DM T2 with hyperglycemia, A1C 7.8%, was on oral meds and insulin at home, now on basal bolus insulin with coverage, blood sugars are elevated but stable, on high dose steroids,    HTN: on antihypertensive medications, monitored, asymptomatic.  Obesity: No strict exercise routines, not on any weight loss program, neither on low calorie diet. On Home GLP1-a        Marsha Tovar MD  Cell: [phone]  Office: [phone]

## 2023-12-16 NOTE — PROGRESS NOTE ADULT - SUBJECTIVE AND OBJECTIVE BOX
Chief complaint    Patient is a 83y old  Female who presents with a chief complaint of Elevated liver enzymes (16 Dec 2023 10:39)   Review of systems  Patient appears comfortable.    Labs and Fingersticks  CAPILLARY BLOOD GLUCOSE      POCT Blood Glucose.: 273 mg/dL (16 Dec 2023 13:11)  POCT Blood Glucose.: 169 mg/dL (16 Dec 2023 09:23)  POCT Blood Glucose.: 262 mg/dL (15 Dec 2023 21:10)  POCT Blood Glucose.: 265 mg/dL (15 Dec 2023 18:47)  POCT Blood Glucose.: 354 mg/dL (15 Dec 2023 13:36)      Anion Gap: 13 (12-16 @ 06:47)  Anion Gap: 13 (12-15 @ 08:35)      Calcium: 9.8 (12-16 @ 06:47)  Calcium: 9.4 (12-15 @ 08:35)  Albumin: 3.3 (12-16 @ 06:47)  Albumin: 3.3 (12-15 @ 08:35)    Alanine Aminotransferase (ALT/SGPT): 483 *H* (12-16 @ 06:47)  Alanine Aminotransferase (ALT/SGPT): 548 *H* (12-15 @ 08:35)  Alkaline Phosphatase: 751 *H* (12-16 @ 06:47)  Alkaline Phosphatase: 799 *H* (12-15 @ 08:35)  Aspartate Aminotransferase (AST/SGOT): 426 *H* (12-16 @ 06:47)  Aspartate Aminotransferase (AST/SGOT): 549 *H* (12-15 @ 08:35)        12-16    139  |  103  |  33<H>  ----------------------------<  117<H>  3.8   |  23  |  1.05    Ca    9.8      16 Dec 2023 06:47    TPro  6.2  /  Alb  3.3  /  TBili  2.2<H>  /  DBili  x   /  AST  426<H>  /  ALT  483<H>  /  AlkPhos  751<H>  12-16    Medications  MEDICATIONS  (STANDING):  apixaban 2.5 milliGRAM(s) Oral every 12 hours  artificial tears (preservative free) Ophthalmic Solution 1 Drop(s) Both EYES three times a day  bisacodyl Suppository 10 milliGRAM(s) Rectal once  cefTRIAXone   IVPB 1000 milliGRAM(s) IV Intermittent every 24 hours  cholecalciferol 1000 Unit(s) Oral daily  dextrose 5%. 1000 milliLiter(s) (50 mL/Hr) IV Continuous <Continuous>  dextrose 5%. 1000 milliLiter(s) (100 mL/Hr) IV Continuous <Continuous>  dextrose 50% Injectable 12.5 Gram(s) IV Push once  dextrose 50% Injectable 25 Gram(s) IV Push once  dextrose 50% Injectable 25 Gram(s) IV Push once  gabapentin 300 milliGRAM(s) Oral at bedtime  gabapentin 100 milliGRAM(s) Oral daily  glucagon  Injectable 1 milliGRAM(s) IntraMuscular once  insulin glargine Injectable (LANTUS) 22 Unit(s) SubCutaneous at bedtime  insulin lispro (ADMELOG) corrective regimen sliding scale   SubCutaneous at bedtime  insulin lispro (ADMELOG) corrective regimen sliding scale   SubCutaneous three times a day before meals  insulin lispro Injectable (ADMELOG) 12 Unit(s) SubCutaneous three times a day before meals  levETIRAcetam 1000 milliGRAM(s) Oral two times a day  metoprolol tartrate 50 milliGRAM(s) Oral two times a day  predniSONE   Tablet 40 milliGRAM(s) Oral daily  traMADol 50 milliGRAM(s) Oral three times a day      Physical Exam  General: Patient appears comfortable.  Vital Signs Last 12 Hrs  T(F): 98.2 (12-16-23 @ 13:16), Max: 98.3 (12-16-23 @ 05:48)  HR: 64 (12-16-23 @ 13:16) (60 - 68)  BP: 102/61 (12-16-23 @ 13:16) (102/61 - 122/60)  BP(mean): --  RR: 18 (12-16-23 @ 13:16) (18 - 18)  SpO2: 97% (12-16-23 @ 13:16) (97% - 99%)  Neck: No palpable thyroid nodules.  CVS: S1S2, No murmurs  Respiratory: No wheezing, no crepitations  GI: Abdomen soft, non tender.    Diagnostics        Radiology:

## 2023-12-16 NOTE — PROGRESS NOTE ADULT - ASSESSMENT
ECHO 10/3/22: EF 40-45%. Akinesis of the inferior with hypokinesis of the inferolateral, septum and anterolateral hypokinetic.    A/p  83y  woman with a PMHx significant for HTN, DM, HLD, CAD, MI, PAD, peripheral neuropathy, former smoker, NSCLC with ANTHONY primary tumor and brain metastasis s/p right craniotomy & resection on 7/27/22 and post-op RT on active treatment, STEMI (10/2022), afib on eliquis, hx of RML PE presenting with elevated lfts     #elevated lfts   -work up per hepatology  -? 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   -s/p right craniotomy for resection of brain mass 7/27/22  -Heme/onc eval noted     #history of PE - dx in AUG 2022   -cont ac    #Dysphagia  -sp cineesophagogram neg   -med fu             d/w acp

## 2023-12-16 NOTE — PROGRESS NOTE ADULT - SUBJECTIVE AND OBJECTIVE BOX
CARDIOLOGY FOLLOW UP - Dr. Alcaraz  DATE OF SERVICE: 12/16/23     CC no cp or sob       REVIEW OF SYSTEMS:  CONSTITUTIONAL: No fever, weight loss, or fatigue  RESPIRATORY: No cough, wheezing, chills or hemoptysis; No Shortness of Breath  CARDIOVASCULAR: No chest pain, palpitations, passing out, dizziness, or leg swelling  GASTROINTESTINAL: No abdominal or epigastric pain. No nausea, vomiting, or hematemesis; No diarrhea or constipation. No melena or hematochezia.  VASCULAR: No edema     PHYSICAL EXAM:  T(C): 36.3 (12-16-23 @ 09:56), Max: 36.9 (12-15-23 @ 15:43)  HR: 60 (12-16-23 @ 09:56) (60 - 75)  BP: 113/68 (12-16-23 @ 09:56) (113/68 - 138/70)  RR: 18 (12-16-23 @ 09:56) (18 - 18)  SpO2: 98% (12-16-23 @ 09:56) (95% - 99%)  Wt(kg): --  I&O's Summary    15 Dec 2023 07:01  -  16 Dec 2023 07:00  --------------------------------------------------------  IN: 1190 mL / OUT: 781 mL / NET: 409 mL    16 Dec 2023 07:01  -  16 Dec 2023 10:39  --------------------------------------------------------  IN: 340 mL / OUT: 0 mL / NET: 340 mL        Appearance: Normal	  Cardiovascular: Normal S1 S2,RRR, No JVD, No murmurs  Respiratory: Lungs clear to auscultation	  Gastrointestinal:  Soft, Non-tender, + BS	  Extremities: Normal range of motion, No clubbing, cyanosis or edema      Home Medications:  gabapentin 100 mg oral capsule: 1 cap(s) orally once a day (in the morning) (09 Dec 2023 15:06)  gabapentin 300 mg oral capsule: 1 cap(s) orally once a day (in the evening) (09 Dec 2023 15:06)  metFORMIN 1000 mg oral tablet: 1 tab(s) orally 2 times a day (11 Dec 2023 10:23)  metoprolol tartrate 50 mg oral tablet: 1 tab(s) orally 2 times a day note: rx has 3 times per day. (11 Dec 2023 10:23)  Ozempic 2 mg/1.5 mL (0.25 mg or 0.5 mg dose) subcutaneous solution: 0.25 milligram(s) subcutaneously once a week on tuesday (11 Dec 2023 10:23)  traMADol 50 mg oral tablet: 1 tab(s) orally 3 times a day (09 Dec 2023 15:04)  Tresiba FlexTouch 100 units/mL subcutaneous solution: 14 unit(s) subcutaneous once a day (11 Dec 2023 10:23)  Vitamin B-12: 1 tab once a day (09 Dec 2023 15:04)  Vitamin D3 25 mcg (1000 intl units) oral tablet: 1 tab(s) orally once a day (09 Dec 2023 15:04)      MEDICATIONS  (STANDING):  apixaban 2.5 milliGRAM(s) Oral every 12 hours  artificial tears (preservative free) Ophthalmic Solution 1 Drop(s) Both EYES three times a day  bisacodyl Suppository 10 milliGRAM(s) Rectal once  cefTRIAXone   IVPB 1000 milliGRAM(s) IV Intermittent every 24 hours  cholecalciferol 1000 Unit(s) Oral daily  dextrose 5%. 1000 milliLiter(s) (50 mL/Hr) IV Continuous <Continuous>  dextrose 5%. 1000 milliLiter(s) (100 mL/Hr) IV Continuous <Continuous>  dextrose 50% Injectable 12.5 Gram(s) IV Push once  dextrose 50% Injectable 25 Gram(s) IV Push once  dextrose 50% Injectable 25 Gram(s) IV Push once  gabapentin 300 milliGRAM(s) Oral at bedtime  gabapentin 100 milliGRAM(s) Oral daily  glucagon  Injectable 1 milliGRAM(s) IntraMuscular once  insulin glargine Injectable (LANTUS) 22 Unit(s) SubCutaneous at bedtime  insulin lispro (ADMELOG) corrective regimen sliding scale   SubCutaneous three times a day before meals  insulin lispro (ADMELOG) corrective regimen sliding scale   SubCutaneous at bedtime  insulin lispro Injectable (ADMELOG) 12 Unit(s) SubCutaneous three times a day before meals  levETIRAcetam 1000 milliGRAM(s) Oral two times a day  metoprolol tartrate 50 milliGRAM(s) Oral two times a day  predniSONE   Tablet 40 milliGRAM(s) Oral daily  traMADol 50 milliGRAM(s) Oral three times a day      TELEMETRY: 	    ECG:  	  RADIOLOGY:   DIAGNOSTIC TESTING:  [ ] Echocardiogram:  [ ]  Catheterization:  [ ] Stress Test:    OTHER: 	    LABS:	 	        12-16    139  |  103  |  33<H>  ----------------------------<  117<H>  3.8   |  23  |  1.05    Ca    9.8      16 Dec 2023 06:47    TPro  6.2  /  Alb  3.3  /  TBili  2.2<H>  /  DBili  x   /  AST  426<H>  /  ALT  483<H>  /  AlkPhos  751<H>  12-16

## 2023-12-17 LAB
ALBUMIN SERPL ELPH-MCNC: 3.4 G/DL — SIGNIFICANT CHANGE UP (ref 3.3–5)
ALBUMIN SERPL ELPH-MCNC: 3.4 G/DL — SIGNIFICANT CHANGE UP (ref 3.3–5)
ALP SERPL-CCNC: 702 U/L — HIGH (ref 40–120)
ALP SERPL-CCNC: 702 U/L — HIGH (ref 40–120)
ALT FLD-CCNC: 439 U/L — HIGH (ref 10–45)
ALT FLD-CCNC: 439 U/L — HIGH (ref 10–45)
ANION GAP SERPL CALC-SCNC: 13 MMOL/L — SIGNIFICANT CHANGE UP (ref 5–17)
ANION GAP SERPL CALC-SCNC: 13 MMOL/L — SIGNIFICANT CHANGE UP (ref 5–17)
AST SERPL-CCNC: 363 U/L — HIGH (ref 10–40)
AST SERPL-CCNC: 363 U/L — HIGH (ref 10–40)
BILIRUB SERPL-MCNC: 2 MG/DL — HIGH (ref 0.2–1.2)
BILIRUB SERPL-MCNC: 2 MG/DL — HIGH (ref 0.2–1.2)
BUN SERPL-MCNC: 31 MG/DL — HIGH (ref 7–23)
BUN SERPL-MCNC: 31 MG/DL — HIGH (ref 7–23)
CALCIUM SERPL-MCNC: 9.2 MG/DL — SIGNIFICANT CHANGE UP (ref 8.4–10.5)
CALCIUM SERPL-MCNC: 9.2 MG/DL — SIGNIFICANT CHANGE UP (ref 8.4–10.5)
CHLORIDE SERPL-SCNC: 103 MMOL/L — SIGNIFICANT CHANGE UP (ref 96–108)
CHLORIDE SERPL-SCNC: 103 MMOL/L — SIGNIFICANT CHANGE UP (ref 96–108)
CO2 SERPL-SCNC: 23 MMOL/L — SIGNIFICANT CHANGE UP (ref 22–31)
CO2 SERPL-SCNC: 23 MMOL/L — SIGNIFICANT CHANGE UP (ref 22–31)
CREAT SERPL-MCNC: 0.97 MG/DL — SIGNIFICANT CHANGE UP (ref 0.5–1.3)
CREAT SERPL-MCNC: 0.97 MG/DL — SIGNIFICANT CHANGE UP (ref 0.5–1.3)
EGFR: 58 ML/MIN/1.73M2 — LOW
EGFR: 58 ML/MIN/1.73M2 — LOW
GLUCOSE BLDC GLUCOMTR-MCNC: 193 MG/DL — HIGH (ref 70–99)
GLUCOSE BLDC GLUCOMTR-MCNC: 193 MG/DL — HIGH (ref 70–99)
GLUCOSE BLDC GLUCOMTR-MCNC: 312 MG/DL — HIGH (ref 70–99)
GLUCOSE BLDC GLUCOMTR-MCNC: 312 MG/DL — HIGH (ref 70–99)
GLUCOSE BLDC GLUCOMTR-MCNC: 411 MG/DL — HIGH (ref 70–99)
GLUCOSE BLDC GLUCOMTR-MCNC: 411 MG/DL — HIGH (ref 70–99)
GLUCOSE BLDC GLUCOMTR-MCNC: 436 MG/DL — HIGH (ref 70–99)
GLUCOSE BLDC GLUCOMTR-MCNC: 436 MG/DL — HIGH (ref 70–99)
GLUCOSE BLDC GLUCOMTR-MCNC: 504 MG/DL — CRITICAL HIGH (ref 70–99)
GLUCOSE BLDC GLUCOMTR-MCNC: 504 MG/DL — CRITICAL HIGH (ref 70–99)
GLUCOSE BLDC GLUCOMTR-MCNC: 529 MG/DL — CRITICAL HIGH (ref 70–99)
GLUCOSE BLDC GLUCOMTR-MCNC: 529 MG/DL — CRITICAL HIGH (ref 70–99)
GLUCOSE SERPL-MCNC: 195 MG/DL — HIGH (ref 70–99)
GLUCOSE SERPL-MCNC: 195 MG/DL — HIGH (ref 70–99)
HCT VFR BLD CALC: 37.6 % — SIGNIFICANT CHANGE UP (ref 34.5–45)
HCT VFR BLD CALC: 37.6 % — SIGNIFICANT CHANGE UP (ref 34.5–45)
HGB BLD-MCNC: 12.4 G/DL — SIGNIFICANT CHANGE UP (ref 11.5–15.5)
HGB BLD-MCNC: 12.4 G/DL — SIGNIFICANT CHANGE UP (ref 11.5–15.5)
MCHC RBC-ENTMCNC: 30.8 PG — SIGNIFICANT CHANGE UP (ref 27–34)
MCHC RBC-ENTMCNC: 30.8 PG — SIGNIFICANT CHANGE UP (ref 27–34)
MCHC RBC-ENTMCNC: 33 GM/DL — SIGNIFICANT CHANGE UP (ref 32–36)
MCHC RBC-ENTMCNC: 33 GM/DL — SIGNIFICANT CHANGE UP (ref 32–36)
MCV RBC AUTO: 93.3 FL — SIGNIFICANT CHANGE UP (ref 80–100)
MCV RBC AUTO: 93.3 FL — SIGNIFICANT CHANGE UP (ref 80–100)
NRBC # BLD: 0 /100 WBCS — SIGNIFICANT CHANGE UP (ref 0–0)
NRBC # BLD: 0 /100 WBCS — SIGNIFICANT CHANGE UP (ref 0–0)
PLATELET # BLD AUTO: 282 K/UL — SIGNIFICANT CHANGE UP (ref 150–400)
PLATELET # BLD AUTO: 282 K/UL — SIGNIFICANT CHANGE UP (ref 150–400)
POTASSIUM SERPL-MCNC: 4.5 MMOL/L — SIGNIFICANT CHANGE UP (ref 3.5–5.3)
POTASSIUM SERPL-MCNC: 4.5 MMOL/L — SIGNIFICANT CHANGE UP (ref 3.5–5.3)
POTASSIUM SERPL-SCNC: 4.5 MMOL/L — SIGNIFICANT CHANGE UP (ref 3.5–5.3)
POTASSIUM SERPL-SCNC: 4.5 MMOL/L — SIGNIFICANT CHANGE UP (ref 3.5–5.3)
PROT SERPL-MCNC: 6.4 G/DL — SIGNIFICANT CHANGE UP (ref 6–8.3)
PROT SERPL-MCNC: 6.4 G/DL — SIGNIFICANT CHANGE UP (ref 6–8.3)
RBC # BLD: 4.03 M/UL — SIGNIFICANT CHANGE UP (ref 3.8–5.2)
RBC # BLD: 4.03 M/UL — SIGNIFICANT CHANGE UP (ref 3.8–5.2)
RBC # FLD: 16.5 % — HIGH (ref 10.3–14.5)
RBC # FLD: 16.5 % — HIGH (ref 10.3–14.5)
SODIUM SERPL-SCNC: 139 MMOL/L — SIGNIFICANT CHANGE UP (ref 135–145)
SODIUM SERPL-SCNC: 139 MMOL/L — SIGNIFICANT CHANGE UP (ref 135–145)
WBC # BLD: 8.48 K/UL — SIGNIFICANT CHANGE UP (ref 3.8–10.5)
WBC # BLD: 8.48 K/UL — SIGNIFICANT CHANGE UP (ref 3.8–10.5)
WBC # FLD AUTO: 8.48 K/UL — SIGNIFICANT CHANGE UP (ref 3.8–10.5)
WBC # FLD AUTO: 8.48 K/UL — SIGNIFICANT CHANGE UP (ref 3.8–10.5)

## 2023-12-17 PROCEDURE — 70450 CT HEAD/BRAIN W/O DYE: CPT | Mod: 26

## 2023-12-17 RX ORDER — INSULIN GLARGINE 100 [IU]/ML
25 INJECTION, SOLUTION SUBCUTANEOUS AT BEDTIME
Refills: 0 | Status: DISCONTINUED | OUTPATIENT
Start: 2023-12-17 | End: 2023-12-18

## 2023-12-17 RX ORDER — INSULIN LISPRO 100/ML
15 VIAL (ML) SUBCUTANEOUS
Refills: 0 | Status: DISCONTINUED | OUTPATIENT
Start: 2023-12-17 | End: 2023-12-18

## 2023-12-17 RX ORDER — INSULIN LISPRO 100/ML
13 VIAL (ML) SUBCUTANEOUS
Refills: 0 | Status: DISCONTINUED | OUTPATIENT
Start: 2023-12-17 | End: 2023-12-17

## 2023-12-17 RX ORDER — ASPIRIN/CALCIUM CARB/MAGNESIUM 324 MG
81 TABLET ORAL DAILY
Refills: 0 | Status: DISCONTINUED | OUTPATIENT
Start: 2023-12-17 | End: 2023-12-17

## 2023-12-17 RX ADMIN — TRAMADOL HYDROCHLORIDE 50 MILLIGRAM(S): 50 TABLET ORAL at 05:47

## 2023-12-17 RX ADMIN — Medication 1: at 10:17

## 2023-12-17 RX ADMIN — Medication 50 MILLIGRAM(S): at 18:32

## 2023-12-17 RX ADMIN — Medication 50 MILLIGRAM(S): at 05:42

## 2023-12-17 RX ADMIN — Medication 6: at 13:00

## 2023-12-17 RX ADMIN — GABAPENTIN 100 MILLIGRAM(S): 400 CAPSULE ORAL at 13:11

## 2023-12-17 RX ADMIN — TRAMADOL HYDROCHLORIDE 50 MILLIGRAM(S): 50 TABLET ORAL at 13:11

## 2023-12-17 RX ADMIN — TRAMADOL HYDROCHLORIDE 50 MILLIGRAM(S): 50 TABLET ORAL at 22:57

## 2023-12-17 RX ADMIN — TRAMADOL HYDROCHLORIDE 50 MILLIGRAM(S): 50 TABLET ORAL at 21:57

## 2023-12-17 RX ADMIN — LEVETIRACETAM 1000 MILLIGRAM(S): 250 TABLET, FILM COATED ORAL at 05:42

## 2023-12-17 RX ADMIN — CEFTRIAXONE 100 MILLIGRAM(S): 500 INJECTION, POWDER, FOR SOLUTION INTRAMUSCULAR; INTRAVENOUS at 13:44

## 2023-12-17 RX ADMIN — INSULIN GLARGINE 25 UNIT(S): 100 INJECTION, SOLUTION SUBCUTANEOUS at 21:57

## 2023-12-17 RX ADMIN — Medication 1000 UNIT(S): at 13:10

## 2023-12-17 RX ADMIN — APIXABAN 2.5 MILLIGRAM(S): 2.5 TABLET, FILM COATED ORAL at 18:32

## 2023-12-17 RX ADMIN — Medication 13 UNIT(S): at 13:01

## 2023-12-17 RX ADMIN — Medication 1 DROP(S): at 13:11

## 2023-12-17 RX ADMIN — GABAPENTIN 300 MILLIGRAM(S): 400 CAPSULE ORAL at 21:57

## 2023-12-17 RX ADMIN — Medication 15 UNIT(S): at 17:33

## 2023-12-17 RX ADMIN — LEVETIRACETAM 1000 MILLIGRAM(S): 250 TABLET, FILM COATED ORAL at 18:32

## 2023-12-17 RX ADMIN — Medication 2: at 21:56

## 2023-12-17 RX ADMIN — APIXABAN 2.5 MILLIGRAM(S): 2.5 TABLET, FILM COATED ORAL at 05:43

## 2023-12-17 RX ADMIN — Medication 1 DROP(S): at 05:42

## 2023-12-17 RX ADMIN — Medication 6: at 17:33

## 2023-12-17 RX ADMIN — Medication 1 DROP(S): at 21:57

## 2023-12-17 RX ADMIN — Medication 40 MILLIGRAM(S): at 05:43

## 2023-12-17 NOTE — CHART NOTE - NSCHARTNOTEFT_GEN_A_CORE
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 Primary team enquiring about starting ASA on top of her eliquis for afib. No head imaging on this admission. 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 Primary team enquiring about starting ASA on top of her eliquis for afib. No head imaging on this admission. - please obtain CTH in order to evaluate for intracranial heme prior to starting ASA  - please page [phone] once CTH complete

## 2023-12-17 NOTE — PROGRESS NOTE ADULT - SUBJECTIVE AND OBJECTIVE BOX
DATE OF SERVICE: 12-17-23 @ 15:04    Patient is a 83y old  Female who presents with a chief complaint of Elevated liver enzymes (17 Dec 2023 14:25)      SUBJECTIVE / OVERNIGHT EVENTS:  No chest pain. No shortness of breath. No complaints. No events overnight. dysuria resolved    MEDICATIONS  (STANDING):  apixaban 2.5 milliGRAM(s) Oral every 12 hours  artificial tears (preservative free) Ophthalmic Solution 1 Drop(s) Both EYES three times a day  bisacodyl Suppository 10 milliGRAM(s) Rectal once  cholecalciferol 1000 Unit(s) Oral daily  dextrose 5%. 1000 milliLiter(s) (50 mL/Hr) IV Continuous <Continuous>  dextrose 5%. 1000 milliLiter(s) (100 mL/Hr) IV Continuous <Continuous>  dextrose 50% Injectable 12.5 Gram(s) IV Push once  dextrose 50% Injectable 25 Gram(s) IV Push once  dextrose 50% Injectable 25 Gram(s) IV Push once  gabapentin 300 milliGRAM(s) Oral at bedtime  gabapentin 100 milliGRAM(s) Oral daily  glucagon  Injectable 1 milliGRAM(s) IntraMuscular once  insulin glargine Injectable (LANTUS) 25 Unit(s) SubCutaneous at bedtime  insulin lispro (ADMELOG) corrective regimen sliding scale   SubCutaneous three times a day before meals  insulin lispro (ADMELOG) corrective regimen sliding scale   SubCutaneous at bedtime  insulin lispro Injectable (ADMELOG) 15 Unit(s) SubCutaneous three times a day before meals  levETIRAcetam 1000 milliGRAM(s) Oral two times a day  metoprolol tartrate 50 milliGRAM(s) Oral two times a day  predniSONE   Tablet 40 milliGRAM(s) Oral daily  traMADol 50 milliGRAM(s) Oral three times a day    MEDICATIONS  (PRN):  Biotene Dry Mouth Oral Rinse 5 milliLiter(s) Swish and Spit four times a day PRN Mouth Care  dextrose Oral Gel 15 Gram(s) Oral once PRN Blood Glucose LESS THAN 70 milliGRAM(s)/deciliter      Vital Signs Last 24 Hrs  T(C): 36.9 (17 Dec 2023 12:13), Max: 36.9 (17 Dec 2023 12:13)  T(F): 98.4 (17 Dec 2023 12:13), Max: 98.4 (17 Dec 2023 12:13)  HR: 63 (17 Dec 2023 12:13) (60 - 69)  BP: 125/70 (17 Dec 2023 12:13) (117/63 - 143/60)  BP(mean): --  RR: 18 (17 Dec 2023 12:13) (18 - 18)  SpO2: 97% (17 Dec 2023 12:13) (97% - 98%)    Parameters below as of 17 Dec 2023 12:13  Patient On (Oxygen Delivery Method): room air      CAPILLARY BLOOD GLUCOSE      POCT Blood Glucose.: 529 mg/dL (17 Dec 2023 12:41)  POCT Blood Glucose.: 504 mg/dL (17 Dec 2023 12:39)  POCT Blood Glucose.: 193 mg/dL (17 Dec 2023 09:24)  POCT Blood Glucose.: 255 mg/dL (16 Dec 2023 21:15)  POCT Blood Glucose.: 389 mg/dL (16 Dec 2023 17:32)    I&O's Summary    16 Dec 2023 07:01  -  17 Dec 2023 07:00  --------------------------------------------------------  IN: 900 mL / OUT: 300 mL / NET: 600 mL    17 Dec 2023 07:01  -  17 Dec 2023 15:04  --------------------------------------------------------  IN: 500 mL / OUT: 0 mL / NET: 500 mL        PHYSICAL EXAM:  GENERAL: NAD, well-developed  HEAD:  Atraumatic, Normocephalic  EYES: EOMI, PERRLA, conjunctiva and sclera clear  NECK: Supple, No JVD  CHEST/LUNG: Clear to auscultation bilaterally; No wheeze  HEART: Regular rate and rhythm; No murmurs, rubs, or gallops  ABDOMEN: Soft, Nontender, Nondistended; Bowel sounds present  EXTREMITIES:  2+ Peripheral Pulses, No clubbing, cyanosis, or edema  PSYCH: AAOx3  NEUROLOGY: non-focal  SKIN: No rashes or lesions    LABS:                        12.4   8.48  )-----------( 282      ( 17 Dec 2023 08:32 )             37.6     12-17    139  |  103  |  31<H>  ----------------------------<  195<H>  4.5   |  23  |  0.97    Ca    9.2      17 Dec 2023 08:32    TPro  6.4  /  Alb  3.4  /  TBili  2.0<H>  /  DBili  x   /  AST  363<H>  /  ALT  439<H>  /  AlkPhos  702<H>  12-17          Urinalysis Basic - ( 17 Dec 2023 08:32 )    Color: x / Appearance: x / SG: x / pH: x  Gluc: 195 mg/dL / Ketone: x  / Bili: x / Urobili: x   Blood: x / Protein: x / Nitrite: x   Leuk Esterase: x / RBC: x / WBC x   Sq Epi: x / Non Sq Epi: x / Bacteria: x        RADIOLOGY & ADDITIONAL TESTS:    Imaging Personally Reviewed:    Consultant(s) Notes Reviewed:      Care Discussed with Consultants/Other Providers:

## 2023-12-17 NOTE — PROGRESS NOTE ADULT - SUBJECTIVE AND OBJECTIVE BOX
Chief complaint  Patient is a 83y old  Female who presents with a chief complaint of Elevated liver enzymes (17 Dec 2023 08:37)         Labs and Fingersticks  CAPILLARY BLOOD GLUCOSE      POCT Blood Glucose.: 529 mg/dL (17 Dec 2023 12:41)  POCT Blood Glucose.: 504 mg/dL (17 Dec 2023 12:39)  POCT Blood Glucose.: 193 mg/dL (17 Dec 2023 09:24)  POCT Blood Glucose.: 255 mg/dL (16 Dec 2023 21:15)  POCT Blood Glucose.: 389 mg/dL (16 Dec 2023 17:32)      Anion Gap: 13 (12-17 @ 08:32)  Anion Gap: 13 (12-16 @ 06:47)      Calcium: 9.2 (12-17 @ 08:32)  Calcium: 9.8 (12-16 @ 06:47)  Albumin: 3.4 (12-17 @ 08:32)  Albumin: 3.3 (12-16 @ 06:47)    Alanine Aminotransferase (ALT/SGPT): 439 *H* (12-17 @ 08:32)  Alanine Aminotransferase (ALT/SGPT): 483 *H* (12-16 @ 06:47)  Alkaline Phosphatase: 702 *H* (12-17 @ 08:32)  Alkaline Phosphatase: 751 *H* (12-16 @ 06:47)  Aspartate Aminotransferase (AST/SGOT): 363 *H* (12-17 @ 08:32)  Aspartate Aminotransferase (AST/SGOT): 426 *H* (12-16 @ 06:47)        12-17    139  |  103  |  31<H>  ----------------------------<  195<H>  4.5   |  23  |  0.97    Ca    9.2      17 Dec 2023 08:32    TPro  6.4  /  Alb  3.4  /  TBili  2.0<H>  /  DBili  x   /  AST  363<H>  /  ALT  439<H>  /  AlkPhos  702<H>  12-17                        12.4   8.48  )-----------( 282      ( 17 Dec 2023 08:32 )             37.6     Medications  MEDICATIONS  (STANDING):  apixaban 2.5 milliGRAM(s) Oral every 12 hours  artificial tears (preservative free) Ophthalmic Solution 1 Drop(s) Both EYES three times a day  bisacodyl Suppository 10 milliGRAM(s) Rectal once  cholecalciferol 1000 Unit(s) Oral daily  dextrose 5%. 1000 milliLiter(s) (50 mL/Hr) IV Continuous <Continuous>  dextrose 5%. 1000 milliLiter(s) (100 mL/Hr) IV Continuous <Continuous>  dextrose 50% Injectable 12.5 Gram(s) IV Push once  dextrose 50% Injectable 25 Gram(s) IV Push once  dextrose 50% Injectable 25 Gram(s) IV Push once  gabapentin 300 milliGRAM(s) Oral at bedtime  gabapentin 100 milliGRAM(s) Oral daily  glucagon  Injectable 1 milliGRAM(s) IntraMuscular once  insulin glargine Injectable (LANTUS) 25 Unit(s) SubCutaneous at bedtime  insulin lispro (ADMELOG) corrective regimen sliding scale   SubCutaneous at bedtime  insulin lispro (ADMELOG) corrective regimen sliding scale   SubCutaneous three times a day before meals  insulin lispro Injectable (ADMELOG) 15 Unit(s) SubCutaneous three times a day before meals  levETIRAcetam 1000 milliGRAM(s) Oral two times a day  metoprolol tartrate 50 milliGRAM(s) Oral two times a day  predniSONE   Tablet 40 milliGRAM(s) Oral daily  traMADol 50 milliGRAM(s) Oral three times a day      Physical Exam  General: Patient comfortable in bed   Vital Signs Last 12 Hrs  T(F): 98.4 (12-17-23 @ 12:13), Max: 98.4 (12-17-23 @ 12:13)  HR: 63 (12-17-23 @ 12:13) (60 - 63)  BP: 125/70 (12-17-23 @ 12:13) (125/70 - 143/60)  BP(mean): --  RR: 18 (12-17-23 @ 12:13) (18 - 18)  SpO2: 97% (12-17-23 @ 12:13) (97% - 98%)    CVS: S1S2   Respiratory: No wheezing, no crepitations  GI: Abdomen soft, bowel sounds positive  Musculoskeletal:  moves all extremities         Chief complaint  Patient is a 83y old  Female who presents with a chief complaint of Elevated liver enzymes (17 Dec 2023 08:37)         Labs and Fingersticks  CAPILLARY BLOOD GLUCOSE      POCT Blood Glucose.: 529 mg/dL (17 Dec 2023 12:41)  POCT Blood Glucose.: 504 mg/dL (17 Dec 2023 12:39)  POCT Blood Glucose.: 193 mg/dL (17 Dec 2023 09:24)  POCT Blood Glucose.: 255 mg/dL (16 Dec 2023 21:15)  POCT Blood Glucose.: 389 mg/dL (16 Dec 2023 17:32)      Anion Gap: 13 (12-17 @ 08:32)  Anion Gap: 13 (12-16 @ 06:47)      Calcium: 9.2 (12-17 @ 08:32)  Calcium: 9.8 (12-16 @ 06:47)  Albumin: 3.4 (12-17 @ 08:32)  Albumin: 3.3 (12-16 @ 06:47)    Alanine Aminotransferase (ALT/SGPT): 439 *H* (12-17 @ 08:32)  Alanine Aminotransferase (ALT/SGPT): 483 *H* (12-16 @ 06:47)  Alkaline Phosphatase: 702 *H* (12-17 @ 08:32)  Alkaline Phosphatase: 751 *H* (12-16 @ 06:47)  Aspartate Aminotransferase (AST/SGOT): 363 *H* (12-17 @ 08:32)  Aspartate Aminotransferase (AST/SGOT): 426 *H* (12-16 @ 06:47)        12-17    139  |  103  |  31<H>  ----------------------------<  195<H>  4.5   |  23  |  0.97    Ca    9.2      17 Dec 2023 08:32    TPro  6.4  /  Alb  3.4  /  TBili  2.0<H>  /  DBili  x   /  AST  363<H>  /  ALT  439<H>  /  AlkPhos  702<H>  12-17                        12.4   8.48  )-----------( 282      ( 17 Dec 2023 08:32 )             37.6     Medications  MEDICATIONS  (STANDING):  apixaban 2.5 milliGRAM(s) Oral every 12 hours  artificial tears (preservative free) Ophthalmic Solution 1 Drop(s) Both EYES three times a day  bisacodyl Suppository 10 milliGRAM(s) Rectal once  cholecalciferol 1000 Unit(s) Oral daily  dextrose 5%. 1000 milliLiter(s) (50 mL/Hr) IV Continuous <Continuous>  dextrose 5%. 1000 milliLiter(s) (100 mL/Hr) IV Continuous <Continuous>  dextrose 50% Injectable 12.5 Gram(s) IV Push once  dextrose 50% Injectable 25 Gram(s) IV Push once  dextrose 50% Injectable 25 Gram(s) IV Push once  gabapentin 300 milliGRAM(s) Oral at bedtime  gabapentin 100 milliGRAM(s) Oral daily  glucagon  Injectable 1 milliGRAM(s) IntraMuscular once  insulin glargine Injectable (LANTUS) 25 Unit(s) SubCutaneous at bedtime  insulin lispro (ADMELOG) corrective regimen sliding scale   SubCutaneous at bedtime  insulin lispro (ADMELOG) corrective regimen sliding scale   SubCutaneous three times a day before meals  insulin lispro Injectable (ADMELOG) 15 Unit(s) SubCutaneous three times a day before meals  levETIRAcetam 1000 milliGRAM(s) Oral two times a day  metoprolol tartrate 50 milliGRAM(s) Oral two times a day  predniSONE   Tablet 40 milliGRAM(s) Oral daily  traMADol 50 milliGRAM(s) Oral three times a day      Physical Exam  General: Patient comfortable in bed   Vital Signs Last 12 Hrs  T(F): 98.4 (12-17-23 @ 12:13), Max: 98.4 (12-17-23 @ 12:13)  HR: 63 (12-17-23 @ 12:13) (60 - 63)  BP: 125/70 (12-17-23 @ 12:13) (125/70 - 143/60)  BP(mean): --  RR: 18 (12-17-23 @ 12:13) (18 - 18)  SpO2: 97% (12-17-23 @ 12:13) (97% - 98%)    CVS: S1S2   Respiratory: No wheezing, no crepitations  GI: Abdomen soft, bowel sounds positive  Musculoskeletal:  moves all extremities

## 2023-12-17 NOTE — PROGRESS NOTE ADULT - ASSESSMENT
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   ·  Plan: - ddx includes DILI 2/2 Keytruda vs less likely Keppra as has been on for a year vs AI vs less likely ischemic   - No longer on Keytruda  - US unremarkable   - Order AI w/u  - MR Abdomen w/ IV   - acute viral hepatitis panel, plus HBV PCR, HCV PCR, HEV IgM/IgG  - ASMA, AMA, LKM  - full infectious workup  - UTox   follow up with oncology re: alternatives to Keytruda for further treatment; if no alternatives, can consider liver biopsy to confirm diagnosis; however, if alternatives available, then would defer bx as liver numbers are improving  - trend LFTs  -  follow up with oncology re: alternatives to Keytruda for further treatment; if no alternatives, can consider liver biopsy to confirm diagnosis; however, if alternatives available, then would defer bx as liver numbers are improving  - BCx NGTD  - plan as per hepatology    dysphagia  - s/p mbs  - regular diet with thin liquids    Chronic atrial fibrillation.   ·  Plan: - On Eliquis 2.5mg BID, continue   - c/w Lopressor 50mg BID.     Type 2 diabetes mellitus.   -  AM A1c 7.8  - SALAS for now  - CC diet  - Diabetic education.    HTN (hypertension).   ·  Plan: - c/w home medications w/ hold parameters.    HLD (hyperlipidemia).   ·  Plan: - No statin given elevated liver enzymes.     CAD (coronary artery disease).   ·  Plan: - On Eliquis.  - start asa if ok with neuro surg  - CT of head first to see if hemorrhage    Primary lung cancer with metastasis from lung to other site.   ·  Plan: - Holding Keytruda  - c/w Keppra for brain mets.  - follow up with oncology     Prophylactic measure.   ·  Plan: DVT ppx: On Eliquis  Diet: DASH/CC  Dispo: can be discharged if cleared by hepatology

## 2023-12-17 NOTE — PROGRESS NOTE ADULT - SUBJECTIVE AND OBJECTIVE BOX
CARDIOLOGY FOLLOW UP - Dr. Alcaraz  Date of Service: 12/17/2023  CC: no events    Review of Systems:  Constitutional: No fever, weight loss, or fatigue  Respiratory: No cough, wheezing, or hemoptysis, no shortness of breath  Cardiovascular: No chest pain, palpitations, passing out, dizziness, or leg swelling  Gastrointestinal: No abd or epigastric pain. No nausea, vomiting, or hematemesis; no diarrhea or consiptaiton, no melena or hematochezia  Vascular: No edema     TELEMETRY:    PHYSICAL EXAM:  T(C): 36.7 (12-17-23 @ 04:11), Max: 36.8 (12-16-23 @ 13:16)  HR: 62 (12-17-23 @ 04:11) (60 - 69)  BP: 143/60 (12-17-23 @ 04:11) (102/61 - 143/60)  RR: 18 (12-17-23 @ 04:11) (18 - 18)  SpO2: 98% (12-17-23 @ 04:11) (97% - 98%)  Wt(kg): --  I&O's Summary    16 Dec 2023 07:01  -  17 Dec 2023 07:00  --------------------------------------------------------  IN: 900 mL / OUT: 300 mL / NET: 600 mL        Appearance: Normal	  Cardiovascular: Normal S1 S2,RRR, No JVD, No murmurs  Respiratory: Lungs clear to auscultation	  Gastrointestinal:  Soft, Non-tender, + BS	  Extremities: Normal range of motion, No clubbing, cyanosis or edema  Vascular: Peripheral pulses palpable 2+ bilaterally       Home Medications:  gabapentin 100 mg oral capsule: 1 cap(s) orally once a day (in the morning) (09 Dec 2023 15:06)  gabapentin 300 mg oral capsule: 1 cap(s) orally once a day (in the evening) (09 Dec 2023 15:06)  metFORMIN 1000 mg oral tablet: 1 tab(s) orally 2 times a day (11 Dec 2023 10:23)  metoprolol tartrate 50 mg oral tablet: 1 tab(s) orally 2 times a day note: rx has 3 times per day. (11 Dec 2023 10:23)  Ozempic 2 mg/1.5 mL (0.25 mg or 0.5 mg dose) subcutaneous solution: 0.25 milligram(s) subcutaneously once a week on tuesday (11 Dec 2023 10:23)  traMADol 50 mg oral tablet: 1 tab(s) orally 3 times a day (09 Dec 2023 15:04)  Tresiba FlexTouch 100 units/mL subcutaneous solution: 14 unit(s) subcutaneous once a day (11 Dec 2023 10:23)  Vitamin B-12: 1 tab once a day (09 Dec 2023 15:04)  Vitamin D3 25 mcg (1000 intl units) oral tablet: 1 tab(s) orally once a day (09 Dec 2023 15:04)        MEDICATIONS  (STANDING):  apixaban 2.5 milliGRAM(s) Oral every 12 hours  artificial tears (preservative free) Ophthalmic Solution 1 Drop(s) Both EYES three times a day  bisacodyl Suppository 10 milliGRAM(s) Rectal once  cefTRIAXone   IVPB 1000 milliGRAM(s) IV Intermittent every 24 hours  cholecalciferol 1000 Unit(s) Oral daily  dextrose 5%. 1000 milliLiter(s) (50 mL/Hr) IV Continuous <Continuous>  dextrose 5%. 1000 milliLiter(s) (100 mL/Hr) IV Continuous <Continuous>  dextrose 50% Injectable 12.5 Gram(s) IV Push once  dextrose 50% Injectable 25 Gram(s) IV Push once  dextrose 50% Injectable 25 Gram(s) IV Push once  gabapentin 300 milliGRAM(s) Oral at bedtime  gabapentin 100 milliGRAM(s) Oral daily  glucagon  Injectable 1 milliGRAM(s) IntraMuscular once  insulin glargine Injectable (LANTUS) 22 Unit(s) SubCutaneous at bedtime  insulin lispro (ADMELOG) corrective regimen sliding scale   SubCutaneous three times a day before meals  insulin lispro (ADMELOG) corrective regimen sliding scale   SubCutaneous at bedtime  insulin lispro Injectable (ADMELOG) 12 Unit(s) SubCutaneous three times a day before meals  levETIRAcetam 1000 milliGRAM(s) Oral two times a day  metoprolol tartrate 50 milliGRAM(s) Oral two times a day  predniSONE   Tablet 40 milliGRAM(s) Oral daily  traMADol 50 milliGRAM(s) Oral three times a day        EKG:  RADIOLOGY:  DIAGNOSTIC TESTING:  [ ] Echocardiogram:  [ ] Catherterization:  [ ] Stress Test:  OTHER:     LABS:	 	      12-16    139  |  103  |  33<H>  ----------------------------<  117<H>  3.8   |  23  |  1.05    Ca    9.8      16 Dec 2023 06:47    TPro  6.2  /  Alb  3.3  /  TBili  2.2<H>  /  DBili  x   /  AST  426<H>  /  ALT  483<H>  /  AlkPhos  751<H>  12-16          CARDIAC MARKERS:

## 2023-12-17 NOTE — PROGRESS NOTE ADULT - ASSESSMENT
ECHO 10/3/22: EF 40-45%. Akinesis of the inferior with hypokinesis of the inferolateral, septum and anterolateral hypokinetic.    A/p  83y  woman with a PMHx significant for HTN, DM, HLD, CAD, MI, PAD, peripheral neuropathy, former smoker, NSCLC with ANTHONY primary tumor and brain metastasis s/p right craniotomy & resection on 7/27/22 and post-op RT on active treatment, STEMI (10/2022), afib on eliquis, hx of RML PE presenting with elevated lfts     #elevated lfts   -work up per hepatology  -? 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   -s/p right craniotomy for resection of brain mass 7/27/22  -Heme/onc eval noted     #history of PE - dx in AUG 2022   -cont ac    #Dysphagia  -sp cineesophagogram neg   -med fu             d/w acp      35 minutes spent on total encounter; more than 50% of the visit was spent counseling and/or coordinating care by the attending physician.

## 2023-12-17 NOTE — PROGRESS NOTE ADULT - ASSESSMENT
82yo F w/ PMH of HTN, HLD, DM2, CAD c/b MI, Afib on Eliquis, PAD, metastatic lung CA w/ known brain mets s/p craniectomy previously on Keytruda pw elevated liver enzymes. Previously seen by our service. Endocrine consulted for hyperglycemias  Assessment  DMT2: 83y Female with DM T2 with hyperglycemia, A1C 7.8%, was on oral meds and insulin at home, now on basal bolus insulin with coverage, blood sugars are elevated with fluctuations, on high dose steroids,    HTN: on antihypertensive medications, monitored, asymptomatic.  Obesity: No strict exercise routines, not on any weight loss program, neither on low calorie diet. On Home GLP1-a        Discussed plan and management with Dr La Ivey NP - TEAMS  Marsha Tovar MD  Cell: [phone]  Office: [phone]            84yo F w/ PMH of HTN, HLD, DM2, CAD c/b MI, Afib on Eliquis, PAD, metastatic lung CA w/ known brain mets s/p craniectomy previously on Keytruda pw elevated liver enzymes. Previously seen by our service. Endocrine consulted for hyperglycemias  Assessment  DMT2: 83y Female with DM T2 with hyperglycemia, A1C 7.8%, was on oral meds and insulin at home, now on basal bolus insulin with coverage, blood sugars are elevated with fluctuations, on high dose steroids,    HTN: on antihypertensive medications, monitored, asymptomatic.  Obesity: No strict exercise routines, not on any weight loss program, neither on low calorie diet. On Home GLP1-a        Discussed plan and management with Dr La Ivey NP - TEAMS  Marsha Tovar MD  Cell: [phone]  Office: [phone]            82yo F w/ PMH of HTN, HLD, DM2, CAD c/b MI, Afib on Eliquis, PAD, metastatic lung CA w/ known brain mets s/p craniectomy previously on Keytruda pw elevated liver enzymes. Previously seen by our service. Endocrine consulted for hyperglycemias  Assessment  DMT2: 83y Female with DM T2 with hyperglycemia, A1C 7.8%, was on oral meds and insulin at home, now on basal bolus insulin with coverage, blood sugars are elevated with fluctuations, remains on steroids.     HTN: on antihypertensive medications, monitored, asymptomatic.  Obesity: No strict exercise routines, not on any weight loss program, neither on low calorie diet. On Home GLP1-a        Discussed plan and management with Dr La Ivey NP - TEAMS  Marsha Tovar MD  Cell: [phone]  Office: [phone]            82yo F w/ PMH of HTN, HLD, DM2, CAD c/b MI, Afib on Eliquis, PAD, metastatic lung CA w/ known brain mets s/p craniectomy previously on Keytruda pw elevated liver enzymes. Previously seen by our service. Endocrine consulted for hyperglycemias  Assessment  DMT2: 83y Female with DM T2 with hyperglycemia, A1C 7.8%, was on oral meds and insulin at home, now on basal bolus insulin with coverage, blood sugars are elevated with fluctuations, remains on steroids.     HTN: on antihypertensive medications, monitored, asymptomatic.  Obesity: No strict exercise routines, not on any weight loss program, neither on low calorie diet. On Home GLP1-a        Discussed plan and management with Dr La Ivey NP - TEAMS  Marsha Tovar MD  Cell: [phone]  Office: [phone]            82yo F w/ PMH of HTN, HLD, DM2, CAD c/b MI, Afib on Eliquis, PAD, metastatic lung CA w/ known brain mets s/p craniectomy previously on Keytruda pw elevated liver enzymes. Previously seen by our service. Endocrine consulted for hyperglycemias  Assessment  DMT2: 83y Female with DM T2 with hyperglycemia, A1C 7.8%, was on oral meds and insulin at home, now on basal bolus insulin with coverage, blood  sugars are elevated with fluctuations, remains on steroids.     HTN: on antihypertensive medications, monitored, asymptomatic.  Obesity: No strict exercise routines, not on any weight loss program, neither on low calorie diet. On Home GLP1-a        Discussed plan and management with Dr La Ivey NP - TEAMS  Marsha Tovar MD  Cell: [phone]  Office: [phone]            82yo F w/ PMH of HTN, HLD, DM2, CAD c/b MI, Afib on Eliquis, PAD, metastatic lung CA w/ known brain mets s/p craniectomy previously on Keytruda pw elevated liver enzymes. Previously seen by our service. Endocrine consulted for hyperglycemias  Assessment  DMT2: 83y Female with DM T2 with hyperglycemia, A1C 7.8%, was on oral meds and insulin at home, now on basal bolus insulin with coverage, blood  sugars are elevated with fluctuations, remains on steroids.     HTN: on antihypertensive medications, monitored, asymptomatic.  Obesity: No strict exercise routines, not on any weight loss program, neither on low calorie diet. On Home GLP1-a        Discussed plan and management with Dr La Ivey NP - TEAMS  Marsha oTvar MD  Cell: [phone]  Office: [phone]

## 2023-12-18 ENCOUNTER — TRANSCRIPTION ENCOUNTER (OUTPATIENT)
Age: 83
End: 2023-12-18

## 2023-12-18 ENCOUNTER — NON-APPOINTMENT (OUTPATIENT)
Age: 83
End: 2023-12-18

## 2023-12-18 VITALS
OXYGEN SATURATION: 98 % | TEMPERATURE: 99 F | HEART RATE: 68 BPM | DIASTOLIC BLOOD PRESSURE: 75 MMHG | SYSTOLIC BLOOD PRESSURE: 130 MMHG | RESPIRATION RATE: 18 BRPM

## 2023-12-18 LAB
ALBUMIN SERPL ELPH-MCNC: 3.3 G/DL — SIGNIFICANT CHANGE UP (ref 3.3–5)
ALBUMIN SERPL ELPH-MCNC: 3.3 G/DL — SIGNIFICANT CHANGE UP (ref 3.3–5)
ALP SERPL-CCNC: 643 U/L — HIGH (ref 40–120)
ALP SERPL-CCNC: 643 U/L — HIGH (ref 40–120)
ALT FLD-CCNC: 409 U/L — HIGH (ref 10–45)
ALT FLD-CCNC: 409 U/L — HIGH (ref 10–45)
ANA PAT FLD IF-IMP: ABNORMAL
ANA PAT FLD IF-IMP: ABNORMAL
ANA TITR SER: ABNORMAL
ANA TITR SER: ABNORMAL
ANION GAP SERPL CALC-SCNC: 15 MMOL/L — SIGNIFICANT CHANGE UP (ref 5–17)
ANION GAP SERPL CALC-SCNC: 15 MMOL/L — SIGNIFICANT CHANGE UP (ref 5–17)
APTT BLD: 32.6 SEC — SIGNIFICANT CHANGE UP (ref 24.5–35.6)
APTT BLD: 32.6 SEC — SIGNIFICANT CHANGE UP (ref 24.5–35.6)
AST SERPL-CCNC: 313 U/L — HIGH (ref 10–40)
AST SERPL-CCNC: 313 U/L — HIGH (ref 10–40)
BILIRUB SERPL-MCNC: 1.8 MG/DL — HIGH (ref 0.2–1.2)
BILIRUB SERPL-MCNC: 1.8 MG/DL — HIGH (ref 0.2–1.2)
BUN SERPL-MCNC: 30 MG/DL — HIGH (ref 7–23)
BUN SERPL-MCNC: 30 MG/DL — HIGH (ref 7–23)
CALCIUM SERPL-MCNC: 9.5 MG/DL — SIGNIFICANT CHANGE UP (ref 8.4–10.5)
CALCIUM SERPL-MCNC: 9.5 MG/DL — SIGNIFICANT CHANGE UP (ref 8.4–10.5)
CHLORIDE SERPL-SCNC: 102 MMOL/L — SIGNIFICANT CHANGE UP (ref 96–108)
CHLORIDE SERPL-SCNC: 102 MMOL/L — SIGNIFICANT CHANGE UP (ref 96–108)
CO2 SERPL-SCNC: 25 MMOL/L — SIGNIFICANT CHANGE UP (ref 22–31)
CO2 SERPL-SCNC: 25 MMOL/L — SIGNIFICANT CHANGE UP (ref 22–31)
CREAT SERPL-MCNC: 1.21 MG/DL — SIGNIFICANT CHANGE UP (ref 0.5–1.3)
CREAT SERPL-MCNC: 1.21 MG/DL — SIGNIFICANT CHANGE UP (ref 0.5–1.3)
EGFR: 44 ML/MIN/1.73M2 — LOW
EGFR: 44 ML/MIN/1.73M2 — LOW
GLUCOSE BLDC GLUCOMTR-MCNC: 170 MG/DL — HIGH (ref 70–99)
GLUCOSE BLDC GLUCOMTR-MCNC: 170 MG/DL — HIGH (ref 70–99)
GLUCOSE BLDC GLUCOMTR-MCNC: 230 MG/DL — HIGH (ref 70–99)
GLUCOSE BLDC GLUCOMTR-MCNC: 230 MG/DL — HIGH (ref 70–99)
GLUCOSE BLDC GLUCOMTR-MCNC: 331 MG/DL — HIGH (ref 70–99)
GLUCOSE BLDC GLUCOMTR-MCNC: 331 MG/DL — HIGH (ref 70–99)
GLUCOSE SERPL-MCNC: 194 MG/DL — HIGH (ref 70–99)
GLUCOSE SERPL-MCNC: 194 MG/DL — HIGH (ref 70–99)
HCT VFR BLD CALC: 36.8 % — SIGNIFICANT CHANGE UP (ref 34.5–45)
HCT VFR BLD CALC: 36.8 % — SIGNIFICANT CHANGE UP (ref 34.5–45)
HGB BLD-MCNC: 12 G/DL — SIGNIFICANT CHANGE UP (ref 11.5–15.5)
HGB BLD-MCNC: 12 G/DL — SIGNIFICANT CHANGE UP (ref 11.5–15.5)
INR BLD: 1.39 RATIO — HIGH (ref 0.85–1.18)
INR BLD: 1.39 RATIO — HIGH (ref 0.85–1.18)
MAGNESIUM SERPL-MCNC: 1.5 MG/DL — LOW (ref 1.6–2.6)
MAGNESIUM SERPL-MCNC: 1.5 MG/DL — LOW (ref 1.6–2.6)
MCHC RBC-ENTMCNC: 30.4 PG — SIGNIFICANT CHANGE UP (ref 27–34)
MCHC RBC-ENTMCNC: 30.4 PG — SIGNIFICANT CHANGE UP (ref 27–34)
MCHC RBC-ENTMCNC: 32.6 GM/DL — SIGNIFICANT CHANGE UP (ref 32–36)
MCHC RBC-ENTMCNC: 32.6 GM/DL — SIGNIFICANT CHANGE UP (ref 32–36)
MCV RBC AUTO: 93.2 FL — SIGNIFICANT CHANGE UP (ref 80–100)
MCV RBC AUTO: 93.2 FL — SIGNIFICANT CHANGE UP (ref 80–100)
NRBC # BLD: 0 /100 WBCS — SIGNIFICANT CHANGE UP (ref 0–0)
NRBC # BLD: 0 /100 WBCS — SIGNIFICANT CHANGE UP (ref 0–0)
PHOSPHATE SERPL-MCNC: 2.6 MG/DL — SIGNIFICANT CHANGE UP (ref 2.5–4.5)
PHOSPHATE SERPL-MCNC: 2.6 MG/DL — SIGNIFICANT CHANGE UP (ref 2.5–4.5)
PLATELET # BLD AUTO: 254 K/UL — SIGNIFICANT CHANGE UP (ref 150–400)
PLATELET # BLD AUTO: 254 K/UL — SIGNIFICANT CHANGE UP (ref 150–400)
POTASSIUM SERPL-MCNC: 3.9 MMOL/L — SIGNIFICANT CHANGE UP (ref 3.5–5.3)
POTASSIUM SERPL-MCNC: 3.9 MMOL/L — SIGNIFICANT CHANGE UP (ref 3.5–5.3)
POTASSIUM SERPL-SCNC: 3.9 MMOL/L — SIGNIFICANT CHANGE UP (ref 3.5–5.3)
POTASSIUM SERPL-SCNC: 3.9 MMOL/L — SIGNIFICANT CHANGE UP (ref 3.5–5.3)
PROT SERPL-MCNC: 6.1 G/DL — SIGNIFICANT CHANGE UP (ref 6–8.3)
PROT SERPL-MCNC: 6.1 G/DL — SIGNIFICANT CHANGE UP (ref 6–8.3)
PROTHROM AB SERPL-ACNC: 14.5 SEC — HIGH (ref 9.5–13)
PROTHROM AB SERPL-ACNC: 14.5 SEC — HIGH (ref 9.5–13)
RBC # BLD: 3.95 M/UL — SIGNIFICANT CHANGE UP (ref 3.8–5.2)
RBC # BLD: 3.95 M/UL — SIGNIFICANT CHANGE UP (ref 3.8–5.2)
RBC # FLD: 16.5 % — HIGH (ref 10.3–14.5)
RBC # FLD: 16.5 % — HIGH (ref 10.3–14.5)
SODIUM SERPL-SCNC: 142 MMOL/L — SIGNIFICANT CHANGE UP (ref 135–145)
SODIUM SERPL-SCNC: 142 MMOL/L — SIGNIFICANT CHANGE UP (ref 135–145)
WBC # BLD: 9.24 K/UL — SIGNIFICANT CHANGE UP (ref 3.8–10.5)
WBC # BLD: 9.24 K/UL — SIGNIFICANT CHANGE UP (ref 3.8–10.5)
WBC # FLD AUTO: 9.24 K/UL — SIGNIFICANT CHANGE UP (ref 3.8–10.5)
WBC # FLD AUTO: 9.24 K/UL — SIGNIFICANT CHANGE UP (ref 3.8–10.5)

## 2023-12-18 PROCEDURE — 81001 URINALYSIS AUTO W/SCOPE: CPT

## 2023-12-18 PROCEDURE — 92610 EVALUATE SWALLOWING FUNCTION: CPT

## 2023-12-18 PROCEDURE — 86038 ANTINUCLEAR ANTIBODIES: CPT

## 2023-12-18 PROCEDURE — 82247 BILIRUBIN TOTAL: CPT

## 2023-12-18 PROCEDURE — 85610 PROTHROMBIN TIME: CPT

## 2023-12-18 PROCEDURE — 82787 IGG 1 2 3 OR 4 EACH: CPT

## 2023-12-18 PROCEDURE — 83036 HEMOGLOBIN GLYCOSYLATED A1C: CPT

## 2023-12-18 PROCEDURE — 74230 X-RAY XM SWLNG FUNCJ C+: CPT

## 2023-12-18 PROCEDURE — 87522 HEPATITIS C REVRS TRNSCRPJ: CPT

## 2023-12-18 PROCEDURE — 80307 DRUG TEST PRSMV CHEM ANLYZR: CPT

## 2023-12-18 PROCEDURE — 80053 COMPREHEN METABOLIC PANEL: CPT

## 2023-12-18 PROCEDURE — 87040 BLOOD CULTURE FOR BACTERIA: CPT

## 2023-12-18 PROCEDURE — 86645 CMV ANTIBODY IGM: CPT

## 2023-12-18 PROCEDURE — 87517 HEPATITIS B DNA QUANT: CPT

## 2023-12-18 PROCEDURE — 74183 MRI ABD W/O CNTR FLWD CNTR: CPT

## 2023-12-18 PROCEDURE — 80048 BASIC METABOLIC PNL TOTAL CA: CPT

## 2023-12-18 PROCEDURE — 86790 VIRUS ANTIBODY NOS: CPT

## 2023-12-18 PROCEDURE — 85025 COMPLETE CBC W/AUTO DIFF WBC: CPT

## 2023-12-18 PROCEDURE — 84100 ASSAY OF PHOSPHORUS: CPT

## 2023-12-18 PROCEDURE — 82140 ASSAY OF AMMONIA: CPT

## 2023-12-18 PROCEDURE — 72170 X-RAY EXAM OF PELVIS: CPT

## 2023-12-18 PROCEDURE — 82784 ASSAY IGA/IGD/IGG/IGM EACH: CPT

## 2023-12-18 PROCEDURE — 87077 CULTURE AEROBIC IDENTIFY: CPT

## 2023-12-18 PROCEDURE — 82248 BILIRUBIN DIRECT: CPT

## 2023-12-18 PROCEDURE — 85027 COMPLETE CBC AUTOMATED: CPT

## 2023-12-18 PROCEDURE — 99285 EMERGENCY DEPT VISIT HI MDM: CPT

## 2023-12-18 PROCEDURE — 85730 THROMBOPLASTIN TIME PARTIAL: CPT

## 2023-12-18 PROCEDURE — 74177 CT ABD & PELVIS W/CONTRAST: CPT | Mod: MG

## 2023-12-18 PROCEDURE — 86381 MITOCHONDRIAL ANTIBODY EACH: CPT

## 2023-12-18 PROCEDURE — 97116 GAIT TRAINING THERAPY: CPT

## 2023-12-18 PROCEDURE — 80076 HEPATIC FUNCTION PANEL: CPT

## 2023-12-18 PROCEDURE — A9585: CPT

## 2023-12-18 PROCEDURE — 86376 MICROSOMAL ANTIBODY EACH: CPT

## 2023-12-18 PROCEDURE — 84439 ASSAY OF FREE THYROXINE: CPT

## 2023-12-18 PROCEDURE — 97161 PT EVAL LOW COMPLEX 20 MIN: CPT

## 2023-12-18 PROCEDURE — 87086 URINE CULTURE/COLONY COUNT: CPT

## 2023-12-18 PROCEDURE — 83735 ASSAY OF MAGNESIUM: CPT

## 2023-12-18 PROCEDURE — 87186 SC STD MICRODIL/AGAR DIL: CPT

## 2023-12-18 PROCEDURE — 86255 FLUORESCENT ANTIBODY SCREEN: CPT

## 2023-12-18 PROCEDURE — 80074 ACUTE HEPATITIS PANEL: CPT

## 2023-12-18 PROCEDURE — 86665 EPSTEIN-BARR CAPSID VCA: CPT

## 2023-12-18 PROCEDURE — 86663 EPSTEIN-BARR ANTIBODY: CPT

## 2023-12-18 PROCEDURE — 70450 CT HEAD/BRAIN W/O DYE: CPT

## 2023-12-18 PROCEDURE — C8929: CPT

## 2023-12-18 PROCEDURE — 86664 EPSTEIN-BARR NUCLEAR ANTIGEN: CPT

## 2023-12-18 PROCEDURE — 86644 CMV ANTIBODY: CPT

## 2023-12-18 PROCEDURE — 83690 ASSAY OF LIPASE: CPT

## 2023-12-18 PROCEDURE — 92611 MOTION FLUOROSCOPY/SWALLOW: CPT

## 2023-12-18 PROCEDURE — 36415 COLL VENOUS BLD VENIPUNCTURE: CPT

## 2023-12-18 PROCEDURE — 84443 ASSAY THYROID STIM HORMONE: CPT

## 2023-12-18 PROCEDURE — 71045 X-RAY EXAM CHEST 1 VIEW: CPT

## 2023-12-18 PROCEDURE — G1004: CPT

## 2023-12-18 PROCEDURE — 82962 GLUCOSE BLOOD TEST: CPT

## 2023-12-18 RX ORDER — INSULIN DEGLUDEC 100 U/ML
25 INJECTION, SOLUTION SUBCUTANEOUS
Qty: 0 | Refills: 0 | DISCHARGE

## 2023-12-18 RX ORDER — MAGNESIUM SULFATE 500 MG/ML
2 VIAL (ML) INJECTION ONCE
Refills: 0 | Status: COMPLETED | OUTPATIENT
Start: 2023-12-18 | End: 2023-12-18

## 2023-12-18 RX ORDER — REPAGLINIDE 1 MG/1
1 TABLET ORAL
Qty: 90 | Refills: 0
Start: 2023-12-18 | End: 2024-01-16

## 2023-12-18 RX ORDER — METOPROLOL TARTRATE 50 MG
1 TABLET ORAL
Qty: 0 | Refills: 0 | DISCHARGE

## 2023-12-18 RX ADMIN — Medication 50 MILLIGRAM(S): at 17:31

## 2023-12-18 RX ADMIN — Medication 25 GRAM(S): at 09:58

## 2023-12-18 RX ADMIN — TRAMADOL HYDROCHLORIDE 50 MILLIGRAM(S): 50 TABLET ORAL at 05:39

## 2023-12-18 RX ADMIN — Medication 15 UNIT(S): at 08:52

## 2023-12-18 RX ADMIN — Medication 15 UNIT(S): at 12:18

## 2023-12-18 RX ADMIN — GABAPENTIN 100 MILLIGRAM(S): 400 CAPSULE ORAL at 12:17

## 2023-12-18 RX ADMIN — Medication 15 UNIT(S): at 17:30

## 2023-12-18 RX ADMIN — Medication 40 MILLIGRAM(S): at 05:37

## 2023-12-18 RX ADMIN — TRAMADOL HYDROCHLORIDE 50 MILLIGRAM(S): 50 TABLET ORAL at 14:30

## 2023-12-18 RX ADMIN — LEVETIRACETAM 1000 MILLIGRAM(S): 250 TABLET, FILM COATED ORAL at 05:37

## 2023-12-18 RX ADMIN — Medication 1 DROP(S): at 05:37

## 2023-12-18 RX ADMIN — TRAMADOL HYDROCHLORIDE 50 MILLIGRAM(S): 50 TABLET ORAL at 13:44

## 2023-12-18 RX ADMIN — Medication 1: at 08:51

## 2023-12-18 RX ADMIN — LEVETIRACETAM 1000 MILLIGRAM(S): 250 TABLET, FILM COATED ORAL at 17:31

## 2023-12-18 RX ADMIN — APIXABAN 2.5 MILLIGRAM(S): 2.5 TABLET, FILM COATED ORAL at 17:31

## 2023-12-18 RX ADMIN — Medication 1 DROP(S): at 13:44

## 2023-12-18 RX ADMIN — APIXABAN 2.5 MILLIGRAM(S): 2.5 TABLET, FILM COATED ORAL at 05:37

## 2023-12-18 RX ADMIN — Medication 2: at 12:18

## 2023-12-18 RX ADMIN — TRAMADOL HYDROCHLORIDE 50 MILLIGRAM(S): 50 TABLET ORAL at 06:32

## 2023-12-18 RX ADMIN — Medication 1000 UNIT(S): at 12:17

## 2023-12-18 RX ADMIN — Medication 4: at 17:30

## 2023-12-18 NOTE — PROGRESS NOTE ADULT - ASSESSMENT
84yo F w/ PMH of HTN, HLD, DM2, CAD c/b MI, Afib on Eliquis, PAD, metastatic lung CA w/ known brain mets s/p craniectomy previously on Keytruda pw elevated liver enzymes noted on outpt labs    UTI  - ceftriaxone x 3 days     Hepatocellular injury.   ·  Plan: - ddx includes DILI 2/2 Keytruda vs less likely Keppra as has been on for a year vs AI vs less likely ischemic   - No longer on Keytruda  - US unremarkable   - Order AI w/u  - MR Abdomen w/ IV   - acute viral hepatitis panel, plus HBV PCR, HCV PCR, HEV IgM/IgG  - ASMA, AMA, LKM  - full infectious workup  - UTox   follow up with oncology re: alternatives to Keytruda for further treatment; if no alternatives, can consider liver biopsy to confirm diagnosis; however, if alternatives available, then would defer bx as liver numbers are improving  - trend LFTs  -  follow up with oncology re: alternatives to Keytruda for further treatment; if no alternatives, can consider liver biopsy to confirm diagnosis; however, if alternatives available, then would defer bx as liver numbers are improving  - BCx NGTD  - plan as per hepatology    dysphagia  - s/p mbs  - regular diet with thin liquids    CAD  - start asa if head ct does not show bleed    Chronic atrial fibrillation.   ·  Plan: - On Eliquis 2.5mg BID, continue   - c/w Lopressor 50mg BID.     Type 2 diabetes mellitus.   -  AM A1c 7.8  - SALAS for now  - CC diet  - Diabetic education.    HTN (hypertension).   ·  Plan: - c/w home medications w/ hold parameters.    HLD (hyperlipidemia).   ·  Plan: - No statin given elevated liver enzymes.     CAD (coronary artery disease).   ·  Plan: - On Eliquis.  - start asa if ok with neuro surg  - CT of head first to see if hemorrhage    Primary lung cancer with metastasis from lung to other site.   ·  Plan: - Holding Keytruda  - c/w Keppra for brain mets.  - follow up with oncology     Prophylactic measure.   ·  Plan: DVT ppx: On Eliquis  Diet: DASH/CC  Dispo: can be discharged today   82yo F w/ PMH of HTN, HLD, DM2, CAD c/b MI, Afib on Eliquis, PAD, metastatic lung CA w/ known brain mets s/p craniectomy previously on Keytruda pw elevated liver enzymes noted on outpt labs    UTI  - ceftriaxone x 3 days     Hepatocellular injury.   ·  Plan: - ddx includes DILI 2/2 Keytruda vs less likely Keppra as has been on for a year vs AI vs less likely ischemic   - No longer on Keytruda  - US unremarkable   - Order AI w/u  - MR Abdomen w/ IV   - acute viral hepatitis panel, plus HBV PCR, HCV PCR, HEV IgM/IgG  - ASMA, AMA, LKM  - full infectious workup  - UTox   follow up with oncology re: alternatives to Keytruda for further treatment; if no alternatives, can consider liver biopsy to confirm diagnosis; however, if alternatives available, then would defer bx as liver numbers are improving  - trend LFTs  -  follow up with oncology re: alternatives to Keytruda for further treatment; if no alternatives, can consider liver biopsy to confirm diagnosis; however, if alternatives available, then would defer bx as liver numbers are improving  - BCx NGTD  - plan as per hepatology    dysphagia  - s/p mbs  - regular diet with thin liquids    CAD  - start asa if head ct does not show bleed    Chronic atrial fibrillation.   ·  Plan: - On Eliquis 2.5mg BID, continue   - c/w Lopressor 50mg BID.     Type 2 diabetes mellitus.   -  AM A1c 7.8  - SALAS for now  - CC diet  - Diabetic education.    HTN (hypertension).   ·  Plan: - c/w home medications w/ hold parameters.    HLD (hyperlipidemia).   ·  Plan: - No statin given elevated liver enzymes.     CAD (coronary artery disease).   ·  Plan: - On Eliquis.  - start asa if ok with neuro surg  - CT of head first to see if hemorrhage    Primary lung cancer with metastasis from lung to other site.   ·  Plan: - Holding Keytruda  - c/w Keppra for brain mets.  - follow up with oncology     Prophylactic measure.   ·  Plan: DVT ppx: On Eliquis  Diet: DASH/CC  Dispo: can be discharged today

## 2023-12-18 NOTE — PROGRESS NOTE ADULT - PROBLEM SELECTOR PLAN 4
Will continue statin, primary team FU.

## 2023-12-18 NOTE — DISCHARGE NOTE PROVIDER - HOSPITAL COURSE
HPI:  Pt is an 82yo F w/ PMH of HTN, HLD, DM2, CAD c/b MI, Afib on Eliquis, PAD, metastatic lung CA w/ known brain mets s/p craniectomy previously on Keytruda pw elevated liver enzymes noted on outpt labs.     Only symptoms she endorses are feeling weak w/ decreased appetite x1 yr since craniectomy. Went to endo visit about 2-3 weeks ago and was noted to have increased liver enzymes which continued to worsen over that time period, leading ot her presentation to [facility].     Here she denies any HA, dizziness, CP, SOB, abd pain, N/V. Does endorse pruritis that's unchanged. In the ED VSS w/ labs as below w/ elevated liver enzymes. (09 Dec 2023 21:48)    Hospital Course:  Patient admitted for elevated liver enzymes on outpatient labs. Abdominal ultrasound with Left renal cyst. Otherwise, unremarkable.  Hepatology consulted.  Acute hepatitis panel negative. CT Abdominal/Pelvis with mild fat stranding surrounding the 1st and 2nd portions of the duodenum of uncertain etiology, however raising the possibility of duodenal inflammation. Periportal lymph node, new since July 18, 2022.  MRI abdomen with mild periportal edema and mild nonspecific gallbladder wall thickening, nothing to explain the elevated LFTs. Oncology consulted, chemotherapy held in the inpatient setting.  Follow up with Dr Sudhakar Ramon outpatient as to further options for treatment of his metastatic lung cancer.  Started on steroids and LFTs improved, will continue outpatient. No further inpatient workup recommended, follow up with hepatology outpatient, may need to pursue liver biopsy if enzymes don’t improve.     Patient with history of NSTEMI, but currently not on a daily aspirin.  Cardiology consulted.  Patient is s/p craniectomy on 2022.  Neurosurgery consulted.  CT Head …….    Incidentally found to have a UTI, urine culture with enterococcus faecalis, treated with 3 day course of Ceftriaxone.       Important Medication Changes and Reason:    Active or Pending Issues Requiring Follow-up:  Follow up with DR Ramon for further treatment of lung canacer    Advanced Directives:   [x] Full code  [ ] DNR  [ ] Hospice    Discharge Diagnoses:  Elevated LFTs  UTI  pAfib  Metastatic lung cancer  Hypertension         HPI:  Pt is an 82yo F w/ PMH of HTN, HLD, DM2, CAD c/b MI, Afib on Eliquis, PAD, metastatic lung CA w/ known brain mets s/p craniectomy previously on Keytruda pw elevated liver enzymes noted on outpt labs.     Only symptoms she endorses are feeling weak w/ decreased appetite x1 yr since craniectomy. Went to endo visit about 2-3 weeks ago and was noted to have increased liver enzymes which continued to worsen over that time period, leading ot her presentation to [facility].     Here she denies any HA, dizziness, CP, SOB, abd pain, N/V. Does endorse pruritis that's unchanged. In the ED VSS w/ labs as below w/ elevated liver enzymes. (09 Dec 2023 21:48)    Hospital Course:  Patient admitted for elevated liver enzymes on outpatient labs. Abdominal ultrasound with Left renal cyst. Otherwise, unremarkable.  Hepatology consulted.  Acute hepatitis panel negative. CT Abdominal/Pelvis with mild fat stranding surrounding the 1st and 2nd portions of the duodenum of uncertain etiology, however raising the possibility of duodenal inflammation. Periportal lymph node, new since July 18, 2022.  MRI abdomen with mild periportal edema and mild nonspecific gallbladder wall thickening, nothing to explain the elevated LFTs. Oncology consulted, chemotherapy held in the inpatient setting.  Follow up with Dr Sudhakar Ramon outpatient as to further options for treatment of his metastatic lung cancer.  Started on steroids and LFTs improved, will continue outpatient. No further inpatient workup recommended, follow up with hepatology outpatient, may need to pursue liver biopsy if enzymes don’t improve.     Patient with history of NSTEMI, but currently not on a daily aspirin.  Cardiology consulted.  Patient is s/p craniectomy on 2022.  Neurosurgery consulted.  CT Head …….    Incidentally found to have a UTI, urine culture with enterococcus faecalis, treated with 3 day course of Ceftriaxone.       Important Medication Changes and Reason:    Active or Pending Issues Requiring Follow-up:  Follow up with DR Ramon for further treatment of lung canacer    Advanced Directives:   [x] Full code  [ ] DNR  [ ] Hospice    Discharge Diagnoses:  Elevated LFTs  UTI  pAfib  Metastatic lung cancer  Hypertension         HPI:  Pt is an 82yo F w/ PMH of HTN, HLD, DM2, CAD c/b MI, Afib on Eliquis, PAD, metastatic lung CA w/ known brain mets s/p craniectomy previously on Keytruda pw elevated liver enzymes noted on outpt labs. Only symptoms she endorses are feeling weak w/ decreased appetite x1 yr since craniectomy. Went to endo visit about 2-3 weeks ago and was noted to have increased liver enzymes which continued to worsen over that time period, leading ot her presentation to [facility]. Here she denies any HA, dizziness, CP, SOB, abd pain, N/V. Does endorse pruritis that's unchanged. In the ED VSS w/ labs as below w/ elevated liver enzymes.     Hospital Course:  Patient admitted for elevated liver enzymes on outpatient labs. Abdominal ultrasound with Left renal cyst. Otherwise, unremarkable.  Hepatology consulted.  Acute hepatitis panel negative. CT Abdominal/Pelvis with mild fat stranding surrounding the 1st and 2nd portions of the duodenum of uncertain etiology, however raising the possibility of duodenal inflammation. Periportal lymph node, new since July 18, 2022.  MRI abdomen with mild periportal edema and mild nonspecific gallbladder wall thickening, nothing to explain the elevated LFTs. Oncology consulted, chemotherapy held in the inpatient setting.  Follow up with Dr Sudhakar Ramon outpatient as to further options for treatment of his metastatic lung cancer.  Started on steroids and LFTs improved, will continue outpatient. No further inpatient workup recommended, follow up with hepatology outpatient, may need to pursue liver biopsy if enzymes don’t improve. Patient with history of NSTEMI, but currently not on a daily aspirin.  Cardiology consulted.  Patient is s/p craniectomy on 2022.  Neurosurgery consulted.  CT Head negative for acute findings, and no evidence of intracranial heme. CTH reviewed by neurosurgery and regarding resuming AP therapy was recommended for _______________________. Incidentally found to have a UTI, urine culture with enterococcus faecalis, treated with 3 day course of Ceftriaxone, completed.       Important Medication Changes and Reason:    Active or Pending Issues Requiring Follow-up:  -Follow up with PCP   -Follow up with Dr. Ramon for further treatment of lung canacer    Advanced Directives:   [x] Full code  [ ] DNR  [ ] Hospice    Discharge Diagnoses:  Elevated LFTs  UTI  pAfib  Metastatic lung cancer  Hypertension         HPI:  Pt is an 84yo F w/ PMH of HTN, HLD, DM2, CAD c/b MI, Afib on Eliquis, PAD, metastatic lung CA w/ known brain mets s/p craniectomy previously on Keytruda pw elevated liver enzymes noted on outpt labs. Only symptoms she endorses are feeling weak w/ decreased appetite x1 yr since craniectomy. Went to endo visit about 2-3 weeks ago and was noted to have increased liver enzymes which continued to worsen over that time period, leading ot her presentation to [facility]. Here she denies any HA, dizziness, CP, SOB, abd pain, N/V. Does endorse pruritis that's unchanged. In the ED VSS w/ labs as below w/ elevated liver enzymes.     Hospital Course:  Patient admitted for elevated liver enzymes on outpatient labs. Abdominal ultrasound with Left renal cyst. Otherwise, unremarkable.  Hepatology consulted.  Acute hepatitis panel negative. CT Abdominal/Pelvis with mild fat stranding surrounding the 1st and 2nd portions of the duodenum of uncertain etiology, however raising the possibility of duodenal inflammation. Periportal lymph node, new since July 18, 2022.  MRI abdomen with mild periportal edema and mild nonspecific gallbladder wall thickening, nothing to explain the elevated LFTs. Oncology consulted, chemotherapy held in the inpatient setting.  Follow up with Dr Sudhakar Ramon outpatient as to further options for treatment of his metastatic lung cancer.  Started on steroids and LFTs improved, will continue outpatient. No further inpatient workup recommended, follow up with hepatology outpatient, may need to pursue liver biopsy if enzymes don’t improve. Patient with history of NSTEMI, but currently not on a daily aspirin.  Cardiology consulted.  Patient is s/p craniectomy on 2022.  Neurosurgery consulted.  CT Head negative for acute findings, and no evidence of intracranial heme. CTH reviewed by neurosurgery and regarding resuming AP therapy was recommended for _______________________. Incidentally found to have a UTI, urine culture with enterococcus faecalis, treated with 3 day course of Ceftriaxone, completed.       Important Medication Changes and Reason:    Active or Pending Issues Requiring Follow-up:  -Follow up with PCP   -Follow up with Dr. Ramon for further treatment of lung canacer    Advanced Directives:   [x] Full code  [ ] DNR  [ ] Hospice    Discharge Diagnoses:  Elevated LFTs  UTI  pAfib  Metastatic lung cancer  Hypertension         HPI:  Pt is an 82yo F w/ PMH of HTN, HLD, DM2, CAD c/b MI, Afib on Eliquis, PAD, metastatic lung CA w/ known brain mets s/p craniectomy previously on Keytruda pw elevated liver enzymes noted on outpt labs. Only symptoms she endorses are feeling weak w/ decreased appetite x1 yr since craniectomy. Went to endo visit about 2-3 weeks ago and was noted to have increased liver enzymes which continued to worsen over that time period, leading ot her presentation to [facility]. Here she denies any HA, dizziness, CP, SOB, abd pain, N/V. Does endorse pruritis that's unchanged. In the ED VSS w/ labs as below w/ elevated liver enzymes.     Hospital Course:  Patient admitted for elevated liver enzymes on outpatient labs. Abdominal ultrasound with Left renal cyst. Otherwise, unremarkable.  Hepatology consulted.  Acute hepatitis panel negative. CT Abdominal/Pelvis with mild fat stranding surrounding the 1st and 2nd portions of the duodenum of uncertain etiology, however raising the possibility of duodenal inflammation. Periportal lymph node, new since July 18, 2022.  MRI abdomen with mild periportal edema and mild nonspecific gallbladder wall thickening, nothing to explain the elevated LFTs. Oncology consulted, chemotherapy held in the inpatient setting. Keytruda held, to continue holding until follow up appointment with oncology outpatient.  Follow up with Dr Sudhakar Ramon outpatient as to further options for treatment of his metastatic lung cancer.  Started on steroids, prednisone 40 mg daily, and LFTs improved, will continue outpatient. No further inpatient workup recommended, follow up with hepatology outpatient, may need to pursue liver biopsy if enzymes don’t improve, for now holding off on liver biopsy per hepatology. Patient with history of NSTEMI, but currently not on a daily aspirin.  Cardiology consulted.  Patient is s/p craniectomy on 2022.  Neurosurgery consulted for clearance of resuming aspirin.  CT Head negative for acute findings, and no evidence of intracranial heme. CTH reviewed by neurosurgery and regarding resuming AP therapy was recommended for _______________________. Incidentally found to have a UTI, urine culture with enterococcus faecalis, treated with 3 day course of Ceftriaxone, completed. Pt to follow up with PCP as outpatient.     Important Medication Changes and Reason:  - Keytruda holding. Do not resume without discussing with outpatient oncology   -On prednisone 40 mg daily  -started on prandin per endocrine    Active or Pending Issues Requiring Follow-up:  -Follow up with PCP   -Follow up with Dr. Ramon for further treatment of lung cancer  -Follow up with ENT outpt for vocal cord paralysis  -Follow up with hepatology clinic outpatient   -Follow up with cardiology outpatient   -Follow up with endocrinology outpatient- may need to adjust prandin dose based on prednisone dosage/adjustment     Advanced Directives:   [x] Full code  [ ] DNR  [ ] Hospice    Discharge Diagnoses:  -Elevated LFTs  -UTI  -Metastatic lung cancer      Discharge/Dispo/Med rec discussed with attending Dr. Garza. Patient medically cleared for discharge Home w/ home PT and HHA, with outpatient follow up with PCP, oncology, hepatology, ENT        HPI:  Pt is an 82yo F w/ PMH of HTN, HLD, DM2, CAD c/b MI, Afib on Eliquis, PAD, metastatic lung CA w/ known brain mets s/p craniectomy previously on Keytruda pw elevated liver enzymes noted on outpt labs. Only symptoms she endorses are feeling weak w/ decreased appetite x1 yr since craniectomy. Went to endo visit about 2-3 weeks ago and was noted to have increased liver enzymes which continued to worsen over that time period, leading ot her presentation to [facility]. Here she denies any HA, dizziness, CP, SOB, abd pain, N/V. Does endorse pruritis that's unchanged. In the ED VSS w/ labs as below w/ elevated liver enzymes.     Hospital Course:  Patient admitted for elevated liver enzymes on outpatient labs. Abdominal ultrasound with Left renal cyst. Otherwise, unremarkable.  Hepatology consulted.  Acute hepatitis panel negative. CT Abdominal/Pelvis with mild fat stranding surrounding the 1st and 2nd portions of the duodenum of uncertain etiology, however raising the possibility of duodenal inflammation. Periportal lymph node, new since July 18, 2022.  MRI abdomen with mild periportal edema and mild nonspecific gallbladder wall thickening, nothing to explain the elevated LFTs. Oncology consulted, chemotherapy held in the inpatient setting. Keytruda held, to continue holding until follow up appointment with oncology outpatient.  Follow up with Dr Sudhakar Ramon outpatient as to further options for treatment of his metastatic lung cancer.  Started on steroids, prednisone 40 mg daily, and LFTs improved, will continue outpatient. No further inpatient workup recommended, follow up with hepatology outpatient, may need to pursue liver biopsy if enzymes don’t improve, for now holding off on liver biopsy per hepatology. Patient with history of NSTEMI, but currently not on a daily aspirin.  Cardiology consulted.  Patient is s/p craniectomy on 2022.  Neurosurgery consulted for clearance of resuming aspirin.  CT Head negative for acute findings, and no evidence of intracranial heme. CTH reviewed by neurosurgery and regarding resuming AP therapy was recommended for _______________________. Incidentally found to have a UTI, urine culture with enterococcus faecalis, treated with 3 day course of Ceftriaxone, completed. Pt to follow up with PCP as outpatient.     Important Medication Changes and Reason:  - Keytruda holding. Do not resume without discussing with outpatient oncology   -On prednisone 40 mg daily  -started on prandin per endocrine    Active or Pending Issues Requiring Follow-up:  -Follow up with PCP   -Follow up with Dr. Ramon for further treatment of lung cancer  -Follow up with ENT outpt for vocal cord paralysis  -Follow up with hepatology clinic outpatient   -Follow up with cardiology outpatient   -Follow up with endocrinology outpatient- may need to adjust prandin dose based on prednisone dosage/adjustment     Advanced Directives:   [x] Full code  [ ] DNR  [ ] Hospice    Discharge Diagnoses:  -Elevated LFTs  -UTI  -Metastatic lung cancer      Discharge/Dispo/Med rec discussed with attending Dr. Garza. Patient medically cleared for discharge Home w/ home PT and HHA, with outpatient follow up with PCP, oncology, hepatology, ENT        HPI:  Pt is an 82yo F w/ PMH of HTN, HLD, DM2, CAD c/b MI, Afib on Eliquis, PAD, metastatic lung CA w/ known brain mets s/p craniectomy previously on Keytruda pw elevated liver enzymes noted on outpt labs. Only symptoms she endorses are feeling weak w/ decreased appetite x1 yr since craniectomy. Went to endo visit about 2-3 weeks ago and was noted to have increased liver enzymes which continued to worsen over that time period, leading ot her presentation to [facility]. Here she denies any HA, dizziness, CP, SOB, abd pain, N/V. Does endorse pruritis that's unchanged. In the ED VSS w/ labs as below w/ elevated liver enzymes.     Hospital Course:  Patient admitted for elevated liver enzymes on outpatient labs. Abdominal ultrasound with Left renal cyst. Otherwise, unremarkable.  Hepatology consulted.  Acute hepatitis panel negative. CT Abdominal/Pelvis with mild fat stranding surrounding the 1st and 2nd portions of the duodenum of uncertain etiology, however raising the possibility of duodenal inflammation. Periportal lymph node, new since July 18, 2022.  MRI abdomen with mild periportal edema and mild nonspecific gallbladder wall thickening, nothing to explain the elevated LFTs. Oncology consulted, chemotherapy held in the inpatient setting. Keytruda held, to continue holding until follow up appointment with oncology outpatient.  Follow up with Dr Sudhakar Ramon outpatient as to further options for treatment of his metastatic lung cancer.  Started on steroids, prednisone 40 mg daily, and LFTs improved, will continue outpatient. No further inpatient workup recommended, follow up with hepatology outpatient, may need to pursue liver biopsy if enzymes don’t improve, for now holding off on liver biopsy per hepatology. Patient with history of NSTEMI, but currently not on a daily aspirin.  Cardiology consulted.  Patient is s/p craniectomy on 2022.  Neurosurgery consulted for clearance of resuming aspirin.  CT Head negative for acute findings, and no evidence of intracranial heme. CTH reviewed by neurosurgery. Per neuro surgery and cardiology "While there is no absolute neurosurgical contraindication to systemic anti coagulation and anti-platelet therapy, there does exist an increased risk of intracranial hemorrhage which can result in significant morbidity. Risks of starting aspirin outweigh benefit at time". Aspirin to continue to be held, pt not to resume. Incidentally found to have a UTI, urine culture with enterococcus faecalis, treated with 3 day course of Ceftriaxone, completed. Pt to follow up with PCP as outpatient.     Important Medication Changes and Reason:  - Keytruda holding. Do not resume without discussing with outpatient oncology   -On prednisone 40 mg daily  -started on prandin per endocrine  -Do not resume aspirin     Active or Pending Issues Requiring Follow-up:  -Follow up with PCP   -Follow up with Dr. Ramon for further treatment of lung cancer  -Follow up with ENT outpt for vocal cord paralysis  -Follow up with hepatology clinic outpatient   -Follow up with cardiology outpatient   -Follow up with endocrinology outpatient- may need to adjust prandin dose based on prednisone dosage/adjustment     Advanced Directives:   [x] Full code  [ ] DNR  [ ] Hospice    Discharge Diagnoses:  -Elevated LFTs  -UTI  -Metastatic lung cancer      Discharge/Dispo/Med rec discussed with attending Dr. Garza. Patient medically cleared for discharge Home w/ home PT and HHA, with outpatient follow up with PCP, oncology, hepatology, ENT        HPI:  Pt is an 84yo F w/ PMH of HTN, HLD, DM2, CAD c/b MI, Afib on Eliquis, PAD, metastatic lung CA w/ known brain mets s/p craniectomy previously on Keytruda pw elevated liver enzymes noted on outpt labs. Only symptoms she endorses are feeling weak w/ decreased appetite x1 yr since craniectomy. Went to endo visit about 2-3 weeks ago and was noted to have increased liver enzymes which continued to worsen over that time period, leading ot her presentation to [facility]. Here she denies any HA, dizziness, CP, SOB, abd pain, N/V. Does endorse pruritis that's unchanged. In the ED VSS w/ labs as below w/ elevated liver enzymes.     Hospital Course:  Patient admitted for elevated liver enzymes on outpatient labs. Abdominal ultrasound with Left renal cyst. Otherwise, unremarkable.  Hepatology consulted.  Acute hepatitis panel negative. CT Abdominal/Pelvis with mild fat stranding surrounding the 1st and 2nd portions of the duodenum of uncertain etiology, however raising the possibility of duodenal inflammation. Periportal lymph node, new since July 18, 2022.  MRI abdomen with mild periportal edema and mild nonspecific gallbladder wall thickening, nothing to explain the elevated LFTs. Oncology consulted, chemotherapy held in the inpatient setting. Keytruda held, to continue holding until follow up appointment with oncology outpatient.  Follow up with Dr Sudhakar Ramon outpatient as to further options for treatment of his metastatic lung cancer.  Started on steroids, prednisone 40 mg daily, and LFTs improved, will continue outpatient. No further inpatient workup recommended, follow up with hepatology outpatient, may need to pursue liver biopsy if enzymes don’t improve, for now holding off on liver biopsy per hepatology. Patient with history of NSTEMI, but currently not on a daily aspirin.  Cardiology consulted.  Patient is s/p craniectomy on 2022.  Neurosurgery consulted for clearance of resuming aspirin.  CT Head negative for acute findings, and no evidence of intracranial heme. CTH reviewed by neurosurgery. Per neuro surgery and cardiology "While there is no absolute neurosurgical contraindication to systemic anti coagulation and anti-platelet therapy, there does exist an increased risk of intracranial hemorrhage which can result in significant morbidity. Risks of starting aspirin outweigh benefit at time". Aspirin to continue to be held, pt not to resume. Incidentally found to have a UTI, urine culture with enterococcus faecalis, treated with 3 day course of Ceftriaxone, completed. Pt to follow up with PCP as outpatient.     Important Medication Changes and Reason:  - Keytruda holding. Do not resume without discussing with outpatient oncology   -On prednisone 40 mg daily  -started on prandin per endocrine  -Do not resume aspirin     Active or Pending Issues Requiring Follow-up:  -Follow up with PCP   -Follow up with Dr. Ramon for further treatment of lung cancer  -Follow up with ENT outpt for vocal cord paralysis  -Follow up with hepatology clinic outpatient   -Follow up with cardiology outpatient   -Follow up with endocrinology outpatient- may need to adjust prandin dose based on prednisone dosage/adjustment     Advanced Directives:   [x] Full code  [ ] DNR  [ ] Hospice    Discharge Diagnoses:  -Elevated LFTs  -UTI  -Metastatic lung cancer      Discharge/Dispo/Med rec discussed with attending Dr. Garza. Patient medically cleared for discharge Home w/ home PT and HHA, with outpatient follow up with PCP, oncology, hepatology, ENT

## 2023-12-18 NOTE — DISCHARGE NOTE PROVIDER - NSDCMRMEDTOKEN_GEN_ALL_CORE_FT
apixaban 2.5 mg oral tablet: 1 tab(s) orally 2 times a day  gabapentin 100 mg oral capsule: 1 cap(s) orally once a day (in the morning)  gabapentin 300 mg oral capsule: 1 cap(s) orally once a day (in the evening)  Keppra  mg oral tablet, extended release: 2 tab(s) orally 2 times a day  metFORMIN 1000 mg oral tablet: 1 tab(s) orally 2 times a day  metoprolol tartrate 50 mg oral tablet: 1 tab(s) orally 2 times a day note: rx has 3 times per day.  ocular lubricant ophthalmic solution: 1 drop(s) to each affected eye 3 times a day  Ozempic 2 mg/1.5 mL (0.25 mg or 0.5 mg dose) subcutaneous solution: 0.25 milligram(s) subcutaneously once a week on tuesday  traMADol 50 mg oral tablet: 1 tab(s) orally 3 times a day  Tresiba FlexTouch 100 units/mL subcutaneous solution: 14 unit(s) subcutaneous once a day  Vitamin B-12: 1 tab once a day  Vitamin D3 25 mcg (1000 intl units) oral tablet: 1 tab(s) orally once a day   apixaban 2.5 mg oral tablet: 1 tab(s) orally 2 times a day  gabapentin 100 mg oral capsule: 1 cap(s) orally once a day (in the morning)  gabapentin 300 mg oral capsule: 1 cap(s) orally once a day (in the evening)  Keppra  mg oral tablet, extended release: 2 tab(s) orally 2 times a day  metFORMIN 1000 mg oral tablet: 1 tab(s) orally 2 times a day  metoprolol tartrate 50 mg oral tablet: 1 tab(s) orally 2 times a day  ocular lubricant ophthalmic solution: 1 drop(s) to each affected eye 3 times a day  Ozempic 2 mg/1.5 mL (0.25 mg or 0.5 mg dose) subcutaneous solution: 0.25 milligram(s) subcutaneously once a week on tuesday  traMADol 50 mg oral tablet: 1 tab(s) orally 3 times a day  Tresiba FlexTouch 100 units/mL subcutaneous solution: 25 unit(s) subcutaneous once a day (at bedtime)  Vitamin B-12: 1 tab once a day  Vitamin D3 25 mcg (1000 intl units) oral tablet: 1 tab(s) orally once a day   apixaban 2.5 mg oral tablet: 1 tab(s) orally 2 times a day  gabapentin 100 mg oral capsule: 1 cap(s) orally once a day (in the morning)  gabapentin 300 mg oral capsule: 1 cap(s) orally once a day (in the evening)  Keppra  mg oral tablet, extended release: 2 tab(s) orally 2 times a day  metFORMIN 1000 mg oral tablet: 1 tab(s) orally 2 times a day  metoprolol tartrate 50 mg oral tablet: 1 tab(s) orally 2 times a day  ocular lubricant ophthalmic solution: 1 drop(s) to each affected eye 3 times a day  Ozempic 2 mg/1.5 mL (0.25 mg or 0.5 mg dose) subcutaneous solution: 0.25 milligram(s) subcutaneously once a week on tuesday  predniSONE 20 mg oral tablet: 2 tab(s) orally once a day  repaglinide 2 mg oral tablet: 1 tab(s) orally 3 times a day (before meals) follow up with endocrine for dose adjustments if changes to prednisone are made  traMADol 50 mg oral tablet: 1 tab(s) orally 3 times a day  Tresiba FlexTouch 100 units/mL subcutaneous solution: 25 unit(s) subcutaneous once a day (at bedtime)  Vitamin B-12: 1 tab once a day  Vitamin D3 25 mcg (1000 intl units) oral tablet: 1 tab(s) orally once a day

## 2023-12-18 NOTE — DISCHARGE NOTE PROVIDER - NSDCFUADDINST_GEN_ALL_CORE_FT
Follow up in Hepatology Clinic: [phone] ([facility] at [address] [place] [address]) or [phone] ([facility] at [address] [place] [address]) or [phone] ([facility] at [address] [place] [address]) Follow up in Hepatology Clinic: [phone] ([facility] at [address] [place] [address]) or [phone] ([facility] at [address] [place] [address]) or [phone] ([facility] at [address] [place] [address]) Follow up in Hepatology Clinic: [phone] ([facility] at [address] [place] [address]) or [phone] ([facility] at [address] [place] [address]) or [phone] ([facility] at [address] [place] [address])  Follow up with endocrinology outpatient- may need to adjust prandin dose based on prednisone dosage/adjustment/discontinuation. If prednisone is changed, contact endocrine  Follow up in Hepatology Clinic: [phone] ([facility] at [address] [place] [address]) or [phone] ([facility] at [address] [place] [address]) or [phone] ([facility] at [address] [place] [address])  Follow up with endocrinology outpatient- may need to adjust prandin dose based on prednisone dosage/adjustment/discontinuation. If prednisone is changed, contact endocrine  Follow up in Hepatology Clinic: [phone] ([facility] at [address] [place] [address]) or [phone] ([facility] at [address] [place] [address]) or [phone] ([facility] at [address] [place] [address])  Follow up with endocrinology outpatient- may need to adjust prandin dose based on prednisone dosage/adjustment/discontinuation. If prednisone is changed, contact endocrine   Pt to not resume aspirin, aspirin discontinued given risks Follow up in Hepatology Clinic: [phone] ([facility] at [address] [place] [address]) or [phone] ([facility] at [address] [place] [address]) or [phone] ([facility] at [address] [place] [address])  Follow up with endocrinology outpatient- may need to adjust prandin dose based on prednisone dosage/adjustment/discontinuation. If prednisone is changed, contact endocrine   Pt to not resume aspirin, aspirin discontinued given risks

## 2023-12-18 NOTE — PROGRESS NOTE ADULT - PROBLEM SELECTOR PLAN 1
Will continue current insulin regimen for now. Will continue monitoring  blood sugars, will Follow up.  Patient counseled for compliance with consistent low carb diet.  .
Will increase Lantus to 22 units at bed time.  Will increase Admelog to 12 units before each meal in addition to Admelog correction scale coverage.  Will continue monitoring FS, log, and glucose trends, will Follow up.  Patient counseled for compliance with consistent low carb diet and exercise as tolerated outpatient.
Will increase Lantus to 22 units at bed time.  Will increase Admelog to 10 units before each meal in addition to Admelog correction scale coverage.  Will continue monitoring FS, log, and glucose trends, will Follow up.  Patient counseled for compliance with consistent low carb diet and exercise as tolerated outpatient.
Will increase Lantus to 25 u at bedtime.  Will increase Admelog to 15 u before each meal and continue Admelog correction scale coverage.   Will continue monitoring  blood sugars, will Follow up.  Patient counseled for compliance with consistent low carb diet.
Will continue Lantus 25 u at bedtime.  Will continue Admelog 15 u before each meal and continue Admelog correction scale coverage.   Will continue monitoring  blood sugars, will Follow up.  Patient counseled for compliance with consistent low carb diet.    DC plan  Remained on prednisone  Home Tresiba 25 units at bedtime  Continue Metformin  Add Prandin 2 mg TID with meals  (prandin to be adjusted if prednisone tapered or stopped)  FU outpatient   She can continue her outpt Ozempic

## 2023-12-18 NOTE — DISCHARGE NOTE PROVIDER - CARE PROVIDER_API CALL
Kimani Uribe  Cardiovascular Disease  [address] [facility], [address]  [place] [address]  Phone: [phone]  Fax: [phone]  Follow Up Time: 2 weeks    Sudhakar Ramon  Medical Oncology  [address] [place] [address]  Phone: [phone]  Fax: [phone]  Follow Up Time: 2 weeks   Kimani Uribe  Cardiovascular Disease  [address] [facility], [address]  [place] [address]  Phone: [phone]  Fax: [phone]  Follow Up Time: 2 weeks    Sudhakar Ramon  Medical Oncology  [address] [place] [address]  Phone: [phone]  Fax: [phone]  Follow Up Time: 2 weeks   Kimani Uribe  Cardiovascular Disease  [address] [facility], [address]  [place] [address]  Phone: [phone]  Fax: [phone]  Follow Up Time: 2 weeks    Sudhakar Ramon  Medical Oncology  [address] [place] [address]  Phone: [phone]  Fax: [phone]  Follow Up Time: 2 weeks    Leonardo Alcaraz  Cardiovascular Disease  [address]  [place] [address]  Phone: [phone]  Fax: [phone]  Follow Up Time:     Marsha Tovar)  Endocrinology/Metab/Diabetes  [address] [place] [address]  Phone: [phone]  Fax: [phone]  Follow Up Time:    Kimani Uribe  Cardiovascular Disease  [address] [facility], [address]  [place] [address]  Phone: [phone]  Fax: [phone]  Follow Up Time: 2 weeks    Sudhakar Ramon  Medical Oncology  [address] [place] [address]  Phone: [phone]  Fax: [phone]  Follow Up Time: 2 weeks    Leonardo Alcaraz  Cardiovascular Disease  [address] [facility] [address]  [place] [address]  Phone: [phone]  Fax: [phone]  Follow Up Time:     Marsha Tovar)  Endocrinology/Metab/Diabetes  [address] [place] [address]  Phone: [phone]  Fax: [phone]  Follow Up Time:

## 2023-12-18 NOTE — PROGRESS NOTE ADULT - REASON FOR ADMISSION
Elevated liver enzymes

## 2023-12-18 NOTE — DISCHARGE NOTE PROVIDER - NSDCFUSCHEDAPPT_GEN_ALL_CORE_FT
[facility]  [facility]  Scheduled Appointment: 12/21/2023    [facility]  [facility]  Scheduled Appointment: 01/11/2024    [facility]  [facility]  Scheduled Appointment: 01/11/2024    Maddy Voss  [facility]  HEPATOLOGY [address]   Scheduled Appointment: 01/18/2024    Sudhakar Ramon  [facility]  [facility]  Scheduled Appointment: 01/30/2024    [facility]  [facility]  Scheduled Appointment: 01/30/2024    [facility]  [facility]  Scheduled Appointment: 02/22/2024    [facility]  [facility]  Scheduled Appointment: 02/22/2024    [facility]  MRI  [facility]  Scheduled Appointment: 03/06/2024    Shasha Duran  [facility]  NEUROLOGY [address]  Scheduled Appointment: 03/06/2024     [facility]  [facility]  Scheduled Appointment: 12/21/2023    [facility]  [facility]  Scheduled Appointment: 01/11/2024    [facility]  [facility]  Scheduled Appointment: 01/11/2024    Maddy Voss  [facility]  HEPATOLOGY [address]   Scheduled Appointment: 01/18/2024    Sudhakar Ramon  [facility]  [facility]  Scheduled Appointment: 01/30/2024    [facility]  [facility]  Scheduled Appointment: 01/30/2024    [facility]  [facility]  Scheduled Appointment: 02/22/2024    [facility]  [facility]  Scheduled Appointment: 02/22/2024    [facility]  MRI  [facility]kv  Scheduled Appointment: 03/06/2024    Shasha Duran  [facility]  NEUROLOGY [address]  Scheduled Appointment: 03/06/2024

## 2023-12-18 NOTE — PROGRESS NOTE ADULT - PROBLEM SELECTOR PROBLEM 2
Hepatocellular injury

## 2023-12-18 NOTE — PROGRESS NOTE ADULT - SUBJECTIVE AND OBJECTIVE BOX
CARDIOLOGY FOLLOW UP - Dr. Alcaraz  DATE OF SERVICE: 12/18/23    CC  No CV complaints     REVIEW OF SYSTEMS:  CONSTITUTIONAL: No fever, weight loss, or fatigue  RESPIRATORY: No cough, wheezing, chills or hemoptysis; No Shortness of Breath  CARDIOVASCULAR: No chest pain, palpitations, passing out, dizziness, or leg swelling  GASTROINTESTINAL: No abdominal or epigastric pain. No nausea, vomiting, or hematemesis; No diarrhea or constipation. No melena or hematochezia.  VASCULAR: No edema     PHYSICAL EXAM:  T(C): 36.6 (12-18-23 @ 04:43), Max: 37 (12-17-23 @ 20:42)  HR: 53 (12-18-23 @ 04:43) (53 - 74)  BP: 132/79 (12-18-23 @ 04:43) (125/70 - 143/63)  RR: 18 (12-18-23 @ 04:43) (18 - 18)  SpO2: 94% (12-18-23 @ 04:43) (94% - 99%)  Wt(kg): --  I&O's Summary    17 Dec 2023 07:01  -  18 Dec 2023 07:00  --------------------------------------------------------  IN: 620 mL / OUT: 0 mL / NET: 620 mL        Appearance: Normal	  Cardiovascular: Normal S1 S2,RRR, No JVD, No murmurs  Respiratory: Lungs clear to auscultation b/l 	  Gastrointestinal:  Soft, Non-tender, + BS	  Extremities: Normal range of motion, No clubbing, cyanosis or edema      Home Medications:  gabapentin 100 mg oral capsule: 1 cap(s) orally once a day (in the morning) (09 Dec 2023 15:06)  gabapentin 300 mg oral capsule: 1 cap(s) orally once a day (in the evening) (09 Dec 2023 15:06)  metFORMIN 1000 mg oral tablet: 1 tab(s) orally 2 times a day (11 Dec 2023 10:23)  metoprolol tartrate 50 mg oral tablet: 1 tab(s) orally 2 times a day note: rx has 3 times per day. (11 Dec 2023 10:23)  Ozempic 2 mg/1.5 mL (0.25 mg or 0.5 mg dose) subcutaneous solution: 0.25 milligram(s) subcutaneously once a week on tuesday (11 Dec 2023 10:23)  traMADol 50 mg oral tablet: 1 tab(s) orally 3 times a day (09 Dec 2023 15:04)  Tresiba FlexTouch 100 units/mL subcutaneous solution: 14 unit(s) subcutaneous once a day (11 Dec 2023 10:23)  Vitamin B-12: 1 tab once a day (09 Dec 2023 15:04)  Vitamin D3 25 mcg (1000 intl units) oral tablet: 1 tab(s) orally once a day (09 Dec 2023 15:04)      MEDICATIONS  (STANDING):  apixaban 2.5 milliGRAM(s) Oral every 12 hours  artificial tears (preservative free) Ophthalmic Solution 1 Drop(s) Both EYES three times a day  bisacodyl Suppository 10 milliGRAM(s) Rectal once  cholecalciferol 1000 Unit(s) Oral daily  dextrose 5%. 1000 milliLiter(s) (50 mL/Hr) IV Continuous <Continuous>  dextrose 5%. 1000 milliLiter(s) (100 mL/Hr) IV Continuous <Continuous>  dextrose 50% Injectable 12.5 Gram(s) IV Push once  dextrose 50% Injectable 25 Gram(s) IV Push once  dextrose 50% Injectable 25 Gram(s) IV Push once  gabapentin 300 milliGRAM(s) Oral at bedtime  gabapentin 100 milliGRAM(s) Oral daily  glucagon  Injectable 1 milliGRAM(s) IntraMuscular once  insulin glargine Injectable (LANTUS) 25 Unit(s) SubCutaneous at bedtime  insulin lispro (ADMELOG) corrective regimen sliding scale   SubCutaneous three times a day before meals  insulin lispro (ADMELOG) corrective regimen sliding scale   SubCutaneous at bedtime  insulin lispro Injectable (ADMELOG) 15 Unit(s) SubCutaneous three times a day before meals  levETIRAcetam 1000 milliGRAM(s) Oral two times a day  metoprolol tartrate 50 milliGRAM(s) Oral two times a day  predniSONE   Tablet 40 milliGRAM(s) Oral daily  traMADol 50 milliGRAM(s) Oral three times a day      TELEMETRY: 	    ECG:  	  RADIOLOGY:   DIAGNOSTIC TESTING:  [ ] Echocardiogram:  [ ]  Catheterization:  [ ] Stress Test:    OTHER: 	    LABS:	 	                            12.0   9.24  )-----------( 254      ( 18 Dec 2023 06:31 )             36.8     12-18    142  |  102  |  30<H>  ----------------------------<  194<H>  3.9   |  25  |  1.21    Ca    9.5      18 Dec 2023 06:30  Phos  2.6     12-18  Mg     1.5     12-18    TPro  6.1  /  Alb  3.3  /  TBili  1.8<H>  /  DBili  x   /  AST  313<H>  /  ALT  409<H>  /  AlkPhos  643<H>  12-18

## 2023-12-18 NOTE — DISCHARGE NOTE PROVIDER - CARE PROVIDERS DIRECT ADDRESSES
,DirectAddress_Unknown,[email or web address] ,DirectAddress_Unknown,[email or web address] ,DirectAddress_Unknown,[email or web address],DirectAddress_Unknown,DirectAddress_Unknown ,DirectAddress_Unknown,[email or web address],DirectAddress_Unknown,DirectAddress_Unknown

## 2023-12-18 NOTE — DISCHARGE NOTE PROVIDER - PROVIDER TOKENS
PROVIDER:[TOKEN:[[id number]],FOLLOWUP:[2 weeks]],PROVIDER:[TOKEN:[[id number]],FOLLOWUP:[2 weeks]] PROVIDER:[TOKEN:[[id number]],FOLLOWUP:[2 weeks]],PROVIDER:[TOKEN:[[id number]],FOLLOWUP:[2 weeks]],PROVIDER:[TOKEN:[[id number]]],PROVIDER:[TOKEN:[[id number]]]

## 2023-12-18 NOTE — DISCHARGE NOTE NURSING/CASE MANAGEMENT/SOCIAL WORK - NSDCPEFALRISK_GEN_ALL_CORE
For information on Fall & Injury Prevention, visit: [email or web address] OR  [email or web address] OR  https://www.cdc.gov/steadi/patient.html For information on Fall & Injury Prevention, visit: [email or web address] OR  [email or web address] OR  https://www.cdc.gov/steadi/patient.html

## 2023-12-18 NOTE — DISCHARGE NOTE NURSING/CASE MANAGEMENT/SOCIAL WORK - NSDCVIVACCINE_GEN_ALL_CORE_FT
influenza, high-dose, quadrivalent; 05-Oct-2022 15:20; Meera Jha (RN); [employer]; [id number] (Exp. Date: 30-Jun-2023); IntraMuscular; Deltoid Left.; 0.7 milliLiter(s); VIS (VIS Published: 06-Aug-2021, VIS Presented: 05-Oct-2022);    influenza, high-dose, quadrivalent; 05-Oct-2022 15:20; Meera Jha (RN); [employer]; [id number] (Exp. Date: 30-Jun-2023); IntraMuscular; Deltoid Left.; 0.7 milliLiter(s); VIS (VIS Published: 06-Aug-2021, VIS Presented: 05-Oct-2022);

## 2023-12-18 NOTE — CHART NOTE - NSCHARTNOTEFT_GEN_A_CORE
Chart and imaging reviewed. CTH done, shows no evidence of intracrnial heme.     While there is no absolute neurosurgical contraindication to systemic anti coagulation and anti-platelet therapy, there does exist an increased risk of intracranial hemorrhage which can result in significant morbidity including but not limited to headache, seizure, stroke, paralysis, and in severe cases even death.    The risks and benefits of starting systemic AC/AP must be considered carefully in the setting of the patients medical history and current clinical condition.

## 2023-12-18 NOTE — DISCHARGE NOTE PROVIDER - NPI NUMBER (FOR SYSADMIN USE ONLY) :
[[id number]],[[id number]] [[id number]],[[id number]] [[id number]],[[id number]],[[id number]],[[id number]] [[id number]],[[id number]],[[id number]],[[id number]]

## 2023-12-18 NOTE — DISCHARGE NOTE PROVIDER - NSDCFUADDAPPT_GEN_ALL_CORE_FT
APPTS ARE READY TO BE MADE: [x] YES    Best Family or Patient Contact (if needed):    Additional Information about above appointments (if needed):    1:   2:   3:     Other comments or requests:    APPTS ARE READY TO BE MADE: [x] YES    Best Family or Patient Contact (if needed):    Additional Information about above appointments (if needed):    1: Follow up with PCP Dr. Uribe outpatient  2: Follow up with oncology Dr. Ramon outpatient   3: Follow up with hepatology clinic outpatient     Other comments or requests:    APPTS ARE READY TO BE MADE: [x] YES    Best Family or Patient Contact (if needed):    Additional Information about above appointments (if needed):    1: Follow up with PCP Dr. Uribe outpatient  2: Follow up with oncology Dr. Ramon outpatient   3: Follow up with hepatology clinic outpatient   4: Follow up with cardiology Dr. Alcaraz outpatient   5: Follow up with ENT outpatient   6: Follow up with endocrine Dr. Tovar outpatient      Other comments or requests:    APPTS ARE READY TO BE MADE: [x] YES    Best Family or Patient Contact (if needed):    Additional Information about above appointments (if needed):    1: Follow up with PCP Dr. Uribe outpatient  2: Follow up with oncology Dr. Ramon outpatient   3: Follow up with hepatology clinic outpatient   4: Follow up with cardiology Dr. Alcaraz outpatient   5: Follow up with ENT outpatient   6: Follow up with endocrine Dr. Tovar outpatient      Other comments or requests:     Patient was previously scheduled to see Dr. Leigh at 10:00AM on 12/21 at [address] [place] [address] APPTS ARE READY TO BE MADE: [x] YES    Best Family or Patient Contact (if needed):    Additional Information about above appointments (if needed):    1: Follow up with PCP Dr. Uribe outpatient  2: Follow up with oncology Dr. Ramon outpatient   3: Follow up with hepatology clinic outpatient   4: Follow up with cardiology Dr. Alcaraz outpatient   5: Follow up with ENT outpatient   6: Follow up with endocrine Dr. Tovar outpatient      Other comments or requests:     Patient was previously scheduled to see Dr. Leigh at 10:00AM on 12/21 at [address] [place] [address] APPTS ARE READY TO BE MADE: [x] YES    Best Family or Patient Contact (if needed):    Additional Information about above appointments (if needed):    1: Follow up with PCP Dr. Uribe outpatient  2: Follow up with oncology Dr. Ramon outpatient   3: Follow up with hepatology clinic outpatient   4: Follow up with cardiology Dr. Alcaraz outpatient   5: Follow up with ENT outpatient   6: Follow up with endocrine Dr. Tovar outpatient      Other comments or requests:     Patient was previously scheduled to see Dr. Leigh at 10:00AM on 12/21 at [address] [place] [address]    Dick declined scheduling assistance as he has yet to go through the discharge papers since the patient's discharge yesterday. He will return our call if unable to secure providers within each specialty. APPTS ARE READY TO BE MADE: [x] YES    Best Family or Patient Contact (if needed):    Additional Information about above appointments (if needed):    1: Follow up with PCP Dr. Uribe outpatient  2: Follow up with oncology Dr. Ramon outpatient   3: Follow up with hepatology clinic outpatient   4: Follow up with cardiology Dr. Alcaraz outpatient   5: Follow up with ENT outpatient   6: Follow up with endocrine Dr. Tovar outpatient      Other comments or requests:     Patient was previously scheduled to see Dr. Leigh at 10:00AM on 12/21 at [address] [place] [address]    Dick declined scheduling assistance as he has yet to go through the discharge papers since the patient's discharge yesterday. He will return our call if unable to secure providers within each specialty. APPTS ARE READY TO BE MADE: [x] YES    Best Family or Patient Contact (if needed):    Additional Information about above appointments (if needed):    1: Follow up with PCP Dr. Uribe outpatient  2: Follow up with oncology Dr. Ramon outpatient   3: Follow up with hepatology clinic outpatient   4: Follow up with cardiology Dr. Alcaraz outpatient   5: Follow up with ENT outpatient   6: Follow up with endocrine Dr. Tovar outpatient      Other comments or requests:     Patient was previously scheduled to see Dr. Leigh at 10:00AM on 12/21 at [address] [place] [id number]    Patient was previously scheduled to see Dr. Voss at 10:45AM on 1/11 at [address] [place] [id number]    Dick declined scheduling assistance as he has yet to go through the discharge papers since the patient's discharge yesterday. He will return our call if unable to secure providers within each specialty. APPTS ARE READY TO BE MADE: [x] YES    Best Family or Patient Contact (if needed):    Additional Information about above appointments (if needed):    1: Follow up with PCP Dr. Uribe outpatient  2: Follow up with oncology Dr. Ramon outpatient   3: Follow up with hepatology clinic outpatient   4: Follow up with cardiology Dr. Alcaraz outpatient   5: Follow up with ENT outpatient   6: Follow up with endocrine Dr. Tovar outpatient      Other comments or requests:     Patient was previously scheduled to see Dr. Leigh at 10:00AM on 12/21 at [address] [place] [id number]    Patient was previously scheduled to see Dr. Voss at 10:45AM on 1/11 at [address] [place] [id number]    Dick declined scheduling assistance as he has yet to go through the discharge papers since the patient's discharge yesterday. He will return our call if unable to secure providers within each specialty.

## 2023-12-18 NOTE — PROGRESS NOTE ADULT - SUBJECTIVE AND OBJECTIVE BOX
Chief complaint  Patient is a 83y old  Female who presents with a chief complaint of Elevated liver enzymes (18 Dec 2023 08:48)         Labs and Fingersticks  CAPILLARY BLOOD GLUCOSE      POCT Blood Glucose.: 170 mg/dL (18 Dec 2023 08:48)  POCT Blood Glucose.: 312 mg/dL (17 Dec 2023 21:30)  POCT Blood Glucose.: 436 mg/dL (17 Dec 2023 17:26)  POCT Blood Glucose.: 411 mg/dL (17 Dec 2023 17:21)  POCT Blood Glucose.: 529 mg/dL (17 Dec 2023 12:41)  POCT Blood Glucose.: 504 mg/dL (17 Dec 2023 12:39)      Anion Gap: 15 (12-18 @ 06:30)  Anion Gap: 13 (12-17 @ 08:32)      Calcium: 9.5 (12-18 @ 06:30)  Calcium: 9.2 (12-17 @ 08:32)  Albumin: 3.3 (12-18 @ 06:30)  Albumin: 3.4 (12-17 @ 08:32)    Alanine Aminotransferase (ALT/SGPT): 409 *H* (12-18 @ 06:30)  Alanine Aminotransferase (ALT/SGPT): 439 *H* (12-17 @ 08:32)  Alkaline Phosphatase: 643 *H* (12-18 @ 06:30)  Alkaline Phosphatase: 702 *H* (12-17 @ 08:32)  Aspartate Aminotransferase (AST/SGOT): 313 *H* (12-18 @ 06:30)  Aspartate Aminotransferase (AST/SGOT): 363 *H* (12-17 @ 08:32)        12-18    142  |  102  |  30<H>  ----------------------------<  194<H>  3.9   |  25  |  1.21    Ca    9.5      18 Dec 2023 06:30  Phos  2.6     12-18  Mg     1.5     12-18    TPro  6.1  /  Alb  3.3  /  TBili  1.8<H>  /  DBili  x   /  AST  313<H>  /  ALT  409<H>  /  AlkPhos  643<H>  12-18                        12.0   9.24  )-----------( 254      ( 18 Dec 2023 06:31 )             36.8     Medications  MEDICATIONS  (STANDING):  apixaban 2.5 milliGRAM(s) Oral every 12 hours  artificial tears (preservative free) Ophthalmic Solution 1 Drop(s) Both EYES three times a day  bisacodyl Suppository 10 milliGRAM(s) Rectal once  cholecalciferol 1000 Unit(s) Oral daily  dextrose 5%. 1000 milliLiter(s) (50 mL/Hr) IV Continuous <Continuous>  dextrose 5%. 1000 milliLiter(s) (100 mL/Hr) IV Continuous <Continuous>  dextrose 50% Injectable 12.5 Gram(s) IV Push once  dextrose 50% Injectable 25 Gram(s) IV Push once  dextrose 50% Injectable 25 Gram(s) IV Push once  gabapentin 300 milliGRAM(s) Oral at bedtime  gabapentin 100 milliGRAM(s) Oral daily  glucagon  Injectable 1 milliGRAM(s) IntraMuscular once  insulin glargine Injectable (LANTUS) 25 Unit(s) SubCutaneous at bedtime  insulin lispro (ADMELOG) corrective regimen sliding scale   SubCutaneous three times a day before meals  insulin lispro (ADMELOG) corrective regimen sliding scale   SubCutaneous at bedtime  insulin lispro Injectable (ADMELOG) 15 Unit(s) SubCutaneous three times a day before meals  levETIRAcetam 1000 milliGRAM(s) Oral two times a day  metoprolol tartrate 50 milliGRAM(s) Oral two times a day  predniSONE   Tablet 40 milliGRAM(s) Oral daily  traMADol 50 milliGRAM(s) Oral three times a day      Physical Exam  General: Patient comfortable in bed  Vital Signs Last 12 Hrs  T(F): 98.5 (12-18-23 @ 08:49), Max: 98.5 (12-18-23 @ 08:49)  HR: 56 (12-18-23 @ 08:49) (53 - 56)  BP: 133/82 (12-18-23 @ 08:49) (132/79 - 133/82)  BP(mean): --  RR: 18 (12-18-23 @ 08:49) (18 - 18)  SpO2: 98% (12-18-23 @ 08:49) (94% - 98%)    CVS: S1S2   Respiratory: No wheezing, no crepitations  GI: Abdomen soft, bowel sounds positive  Musculoskeletal:  moves all extremities       Chief complaint  Patient is a 83y old  Female who presents with a chief complaint of Elevated liver enzymes (18 Dec 2023 08:48)         Labs and Fingersticks  CAPILLARY BLOOD GLUCOSE      POCT Blood Glucose.: 170 mg/dL (18 Dec 2023 08:48)  POCT Blood Glucose.: 312 mg/dL (17 Dec 2023 21:30)  POCT Blood Glucose.: 436 mg/dL (17 Dec 2023 17:26)  POCT Blood Glucose.: 411 mg/dL (17 Dec 2023 17:21)  POCT Blood Glucose.: 529 mg/dL (17 Dec 2023 12:41)  POCT Blood Glucose.: 504 mg/dL (17 Dec 2023 12:39)      Anion Gap: 15 (12-18 @ 06:30)  Anion Gap: 13 (12-17 @ 08:32)      Calcium: 9.5 (12-18 @ 06:30)  Calcium: 9.2 (12-17 @ 08:32)  Albumin: 3.3 (12-18 @ 06:30)  Albumin: 3.4 (12-17 @ 08:32)    Alanine Aminotransferase (ALT/SGPT): 409 *H* (12-18 @ 06:30)  Alanine Aminotransferase (ALT/SGPT): 439 *H* (12-17 @ 08:32)  Alkaline Phosphatase: 643 *H* (12-18 @ 06:30)  Alkaline Phosphatase: 702 *H* (12-17 @ 08:32)  Aspartate Aminotransferase (AST/SGOT): 313 *H* (12-18 @ 06:30)  Aspartate Aminotransferase (AST/SGOT): 363 *H* (12-17 @ 08:32)        12-18    142  |  102  |  30<H>  ----------------------------<  194<H>  3.9   |  25  |  1.21    Ca    9.5      18 Dec 2023 06:30  Phos  2.6     12-18  Mg     1.5     12-18    TPro  6.1  /  Alb  3.3  /  TBili  1.8<H>  /  DBili  x   /  AST  313<H>  /  ALT  409<H>  /  AlkPhos  643<H>  12-18                        12.0   9.24  )-----------( 254      ( 18 Dec 2023 06:31 )             36.8     Medications  MEDICATIONS  (STANDING):  apixaban 2.5 milliGRAM(s) Oral every 12 hours  artificial tears (preservative free) Ophthalmic Solution 1 Drop(s) Both EYES three times a day  bisacodyl Suppository 10 milliGRAM(s) Rectal once  cholecalciferol 1000 Unit(s) Oral daily  dextrose 5%. 1000 milliLiter(s) (50 mL/Hr) IV Continuous <Continuous>  dextrose 5%. 1000 milliLiter(s) (100 mL/Hr) IV Continuous <Continuous>  dextrose 50% Injectable 12.5 Gram(s) IV Push once  dextrose 50% Injectable 25 Gram(s) IV Push once  dextrose 50% Injectable 25 Gram(s) IV Push once  gabapentin 300 milliGRAM(s) Oral at bedtime  gabapentin 100 milliGRAM(s) Oral daily  glucagon  Injectable 1 milliGRAM(s) IntraMuscular once  insulin glargine Injectable (LANTUS) 25 Unit(s) SubCutaneous at bedtime  insulin lispro (ADMELOG) corrective regimen sliding scale   SubCutaneous three times a day before meals  insulin lispro (ADMELOG) corrective regimen sliding scale   SubCutaneous at bedtime  insulin lispro Injectable (ADMELOG) 15 Unit(s) SubCutaneous three times a day before meals  levETIRAcetam 1000 milliGRAM(s) Oral two times a day  metoprolol tartrate 50 milliGRAM(s) Oral two times a day  predniSONE   Tablet 40 milliGRAM(s) Oral daily  traMADol 50 milliGRAM(s) Oral three times a day      Physical Exam  General: Patient comfortable in bed  Vital Signs Last 12 Hrs  T(F): 98.5 (12-18-23 @ 08:49), Max: 98.5 (12-18-23 @ 08:49)  HR: 56 (12-18-23 @ 08:49) (53 - 56)  BP: 133/82 (12-18-23 @ 08:49) (132/79 - 133/82)  BP(mean): --  RR: 18 (12-18-23 @ 08:49) (18 - 18)  SpO2: 98% (12-18-23 @ 08:49) (94% - 98%)    CVS: S1S2   Respiratory: No wheezing, no crepitations  GI: Abdomen soft, bowel sounds positive  Musculoskeletal:  moves all extremities

## 2023-12-18 NOTE — PROGRESS NOTE ADULT - ASSESSMENT
ECHO 10/3/22: EF 40-45%. Akinesis of the inferior with hypokinesis of the inferolateral, septum and anterolateral hypokinetic.    A/p  83y  woman with a PMHx significant for HTN, DM, HLD, CAD, MI, PAD, peripheral neuropathy, former smoker, NSCLC with ANTHONY primary tumor and brain metastasis s/p right craniotomy & resection on 7/27/22 and post-op RT on active treatment, STEMI (10/2022), afib on eliquis, hx of RML PE presenting with elevated lfts     #elevated lfts   -work up per hepatology  -? med induced   -hepatology eval noted  -LFTs improving   -CT abd/pelv noted   -MR abdomen noted   -Per heme/onc will defer liver bx at this time  -Cont steroids per hepatology    #Paroxysmal atrial fibrillation  -cont apixaban   -cont metoprolol  -Replete mg    #hypertension  -stable  -cont bb    #Hx NSTEMI, CAD, s/p PCI  -STEMI in 8/2022 treated medically at the time in setting of recent craniotomy  -No chest pain or sob  -no statins given lfts  -Echo with nml lv fxn, mild MR   -Per neurosx, requires CTH prior to starting asa qd  -F/u CTH results & further neurosx recs     #Metast. Lung cancer to brain.   -s/p right craniotomy for resection of brain mass 7/27/22  -Heme/onc eval noted     #history of PE - dx in AUG 2022   -cont ac    #Dysphagia  -sp cineesophagogram neg   -med fu             d/w acp

## 2023-12-18 NOTE — PROGRESS NOTE ADULT - ASSESSMENT
82yo F w/ PMH of HTN, HLD, DM2, CAD c/b MI, Afib on Eliquis, PAD, metastatic lung CA w/ known brain mets s/p craniectomy previously on Keytruda pw elevated liver enzymes. Previously seen by our service. Endocrine consulted for hyperglycemias  Assessment  DMT2: 83y Female with DM T2 with hyperglycemia, A1C 7.8%, was on oral meds and insulin at home, now on basal bolus insulin with coverage, blood  sugars are elevated with fluctuations, remains on steroids.     DC planning, glucose improving.   HTN: on antihypertensive medications, monitored, asymptomatic.  Obesity: No strict exercise routines, not on any weight loss program, neither on low calorie diet. On Home GLP1-a        Discussed plan and management with Dr La Ivey NP - TEAMS  Marsha Tovar MD  Cell: [phone]  Office: [phone]            84yo F w/ PMH of HTN, HLD, DM2, CAD c/b MI, Afib on Eliquis, PAD, metastatic lung CA w/ known brain mets s/p craniectomy previously on Keytruda pw elevated liver enzymes. Previously seen by our service. Endocrine consulted for hyperglycemias  Assessment  DMT2: 83y Female with DM T2 with hyperglycemia, A1C 7.8%, was on oral meds and insulin at home, now on basal bolus insulin with coverage, blood  sugars are elevated with fluctuations, remains on steroids.     DC planning, glucose improving.   HTN: on antihypertensive medications, monitored, asymptomatic.  Obesity: No strict exercise routines, not on any weight loss program, neither on low calorie diet. On Home GLP1-a        Discussed plan and management with Dr La Ivey NP - TEAMS  Marsha Tovar MD  Cell: [phone]  Office: [phone]            84yo F w/ PMH of HTN, HLD, DM2, CAD c/b MI, Afib on Eliquis, PAD, metastatic lung CA w/ known brain mets s/p craniectomy previously on Keytruda pw elevated liver enzymes. Previously seen by our service. Endocrine consulted for hyperglycemias  Assessment  DMT2: 83y Female with DM T2 with hyperglycemia, A1C 7.8%, was on oral meds and insulin at home, now on basal bolus insulin with coverage, blood sugars are elevated with fluctuations, remains on steroids.     DC planning, glucose improving.   HTN: on antihypertensive medications, monitored, asymptomatic.  Obesity: No strict exercise routines, not on any weight loss program, neither on low calorie diet. On Home GLP1-a        Discussed plan and management with Dr La Ivey NP - TEAMS  Marsha Tovar MD  Cell: [phone]  Office: [phone]            84yo F w/ PMH of HTN, HLD, DM2, CAD c/b MI, Afib on Eliquis, PAD, metastatic lung CA w/ known brain mets s/p craniectomy previously on Keytruda pw elevated liver enzymes. Previously seen by our service. Endocrine consulted for hyperglycemias  Assessment  DMT2: 83y Female with DM T2 with hyperglycemia, A1C 7.8%, was on oral meds and insulin at home, now on basal bolus insulin with coverage, blood sugars are elevated with fluctuations, remains on steroids.     DC planning, glucose improving.   HTN: on antihypertensive medications, monitored, asymptomatic.  Obesity: No strict exercise routines, not on any weight loss program, neither on low calorie diet. On Home GLP1-a        Discussed plan and management with Dr La Ivey NP - TEAMS  Marsha Tovar MD  Cell: [phone]  Office: [phone]

## 2023-12-18 NOTE — PROGRESS NOTE ADULT - PROVIDER SPECIALTY LIST ADULT
Cardiology
Hepatology
Hepatology
Internal Medicine
Internal Medicine
Endocrinology
Internal Medicine
Cardiology
Cardiology
Internal Medicine
Cardiology
Hepatology
Internal Medicine
Endocrinology
Heme/Onc
Endocrinology

## 2023-12-18 NOTE — DISCHARGE NOTE PROVIDER - NSFOLLOWUPCLINICSTOKEN_GEN_ALL_ED_FT
[id number]: || ||00\01||False;[id number]: || ||00\01||False;[id number]: || ||00\01||False; [id number]: || ||00\01||False;[id number]: || ||00\01||False;[id number]: || ||00\01||False;

## 2023-12-18 NOTE — DISCHARGE NOTE PROVIDER - NSDCCPCAREPLAN_GEN_ALL_CORE_FT
PRINCIPAL DISCHARGE DIAGNOSIS  Diagnosis: Transaminitis  Assessment and Plan of Treatment: You had an abdominal ultrasound, CT A/P and MRI abdomen that did not an etiology for the elevated liver enzymes  Continue prednisone as prescribed as your liver enzymes improved after it was started  Follow up with hepatology clinic outpatient, [phone].  You may need a liver biopsy.      SECONDARY DISCHARGE DIAGNOSES  Diagnosis: Urinary tract infection  Assessment and Plan of Treatment: You had completed your course of antibiotics.   Now resolved      Diagnosis: Metastatic primary lung cancer  Assessment and Plan of Treatment: Follow up with Dr Sudhakar Ramon outpatient for continuity of care.    Diagnosis: Paroxysmal atrial fibrillation  Assessment and Plan of Treatment: Continue Eliquis as prescribed  Atrial fibrillation is the most common heart rhythm problem & has the risk of stroke & heart attack  It helps if you control your blood pressure, not drink more than 1-2 alcohol drinks per day, cut down on caffeine, getting treatment for over active thyroid gland, & getting exercise  Call your doctor if you feel your heart racing or beating unusually, chest tightness or pain, lightheaded, faint, shortness of breath especially with exercise  It is important to take your heart medication as prescribed      Diagnosis: Hypertension  Assessment and Plan of Treatment: Low salt diet  Activity as tolerated.  Take all medication as prescribed.  Follow up with your medical doctor for routine blood pressure monitoring at your next visit.  Notify your doctor if you have any of the following symptoms:   Dizziness, Lightheadedness, Blurry vision, Headache, Chest pain, Shortness of breath      Diagnosis: Type 2 diabetes mellitus  Assessment and Plan of Treatment: HgA1C this admission was 7.8.  Make sure you get your HgA1c checked every three months.  If you take oral diabetes medications, check your blood glucose two times a day.  If you take insulin, check your blood glucose before meals and at bedtime.  It's important not to skip any meals.       PRINCIPAL DISCHARGE DIAGNOSIS  Diagnosis: Transaminitis  Assessment and Plan of Treatment: You had an abdominal ultrasound, CT A/P and MRI abdomen that did not an etiology for the elevated liver enzymes  Continue prednisone as prescribed as your liver enzymes improved after it was started  Follow up with hepatology clinic outpatient, [phone].  You may need a liver biopsy.      SECONDARY DISCHARGE DIAGNOSES  Diagnosis: Urinary tract infection  Assessment and Plan of Treatment: You had completed your course of antibiotics.   Now resolved      Diagnosis: Metastatic primary lung cancer  Assessment and Plan of Treatment: Follow up with Dr Sudhakar Ramon outpatient for continuity of care.    Diagnosis: Paroxysmal atrial fibrillation  Assessment and Plan of Treatment: Continue Eliquis as prescribed  Atrial fibrillation is the most common heart rhythm problem & has the risk of stroke & heart attack  It helps if you control your blood pressure, not drink more than 1-2 alcohol drinks per day, cut down on caffeine, getting treatment for over active thyroid gland, & getting exercise  Call your doctor if you feel your heart racing or beating unusually, chest tightness or pain, lightheaded, faint, shortness of breath especially with exercise  It is important to take your heart medication as prescribed      Diagnosis: Hypertension  Assessment and Plan of Treatment: Low salt diet  Activity as tolerated.  Take all medication as prescribed.  Follow up with your medical doctor for routine blood pressure monitoring at your next visit.  Notify your doctor if you have any of the following symptoms:   Dizziness, Lightheadedness, Blurry vision, Headache, Chest pain, Shortness of breath      Diagnosis: Type 2 diabetes mellitus  Assessment and Plan of Treatment: HgA1C this admission was 7.8.  Make sure you get your HgA1c checked every three months.  If you take oral diabetes medications, check your blood glucose two times a day.  If you take insulin, check your blood glucose before meals and at bedtime.  It's important not to skip any meals.       PRINCIPAL DISCHARGE DIAGNOSIS  Diagnosis: Transaminitis  Assessment and Plan of Treatment: You had an abdominal ultrasound, CT A/P and MRI abdomen that did not an etiology for the elevated liver enzymes  Continue prednisone as prescribed as your liver enzymes improved after it was started  Follow up with hepatology clinic outpatient, [phone].  You may need a liver biopsy.      SECONDARY DISCHARGE DIAGNOSES  Diagnosis: Urinary tract infection  Assessment and Plan of Treatment: You had completed your course of antibiotics.   Now resolved      Diagnosis: Metastatic primary lung cancer  Assessment and Plan of Treatment: Follow up with Dr Sudhakar Cantor outpatient for continuity of care.  do not resume keytruda before discussing with dr cantor    Diagnosis: Paroxysmal atrial fibrillation  Assessment and Plan of Treatment: Continue Eliquis as prescribed  Atrial fibrillation is the most common heart rhythm problem & has the risk of stroke & heart attack  It helps if you control your blood pressure, not drink more than 1-2 alcohol drinks per day, cut down on caffeine, getting treatment for over active thyroid gland, & getting exercise  Call your doctor if you feel your heart racing or beating unusually, chest tightness or pain, lightheaded, faint, shortness of breath especially with exercise  It is important to take your heart medication as prescribed  follow up with cardiology outpatient       Diagnosis: Hypertension  Assessment and Plan of Treatment: Low salt diet  Activity as tolerated.  Take all medication as prescribed.  Follow up with your medical doctor for routine blood pressure monitoring at your next visit.  Notify your doctor if you have any of the following symptoms:   Dizziness, Lightheadedness, Blurry vision, Headache, Chest pain, Shortness of breath      Diagnosis: Type 2 diabetes mellitus  Assessment and Plan of Treatment: HgA1C this admission was 7.8.  Make sure you get your HgA1c checked every three months.  If you take oral diabetes medications, check your blood glucose two times a day.  If you take insulin, check your blood glucose before meals and at bedtime.  It's important not to skip any meals.  follow up with endocrine outpatient , continue medication as prescribed  Prandin dose may need to be adjusted depending on steroid dosage/discontinuation, if any changes to prednisone, contact endocrine       PRINCIPAL DISCHARGE DIAGNOSIS  Diagnosis: Transaminitis  Assessment and Plan of Treatment: You had an abdominal ultrasound, CT A/P and MRI abdomen that did not an etiology for the elevated liver enzymes  Continue prednisone as prescribed as your liver enzymes improved after it was started  Follow up with hepatology clinic outpatient, [phone].  You may need a liver biopsy.      SECONDARY DISCHARGE DIAGNOSES  Diagnosis: Urinary tract infection  Assessment and Plan of Treatment: You had completed your course of antibiotics.   Now resolved      Diagnosis: Metastatic primary lung cancer  Assessment and Plan of Treatment: Follow up with Dr Sudhakar Cantor outpatient for continuity of care.  do not resume keytruda before discussing with dr cantor    Diagnosis: Paroxysmal atrial fibrillation  Assessment and Plan of Treatment: Continue Eliquis as prescribed  Atrial fibrillation is the most common heart rhythm problem & has the risk of stroke & heart attack  It helps if you control your blood pressure, not drink more than 1-2 alcohol drinks per day, cut down on caffeine, getting treatment for over active thyroid gland, & getting exercise  Call your doctor if you feel your heart racing or beating unusually, chest tightness or pain, lightheaded, faint, shortness of breath especially with exercise  It is important to take your heart medication as prescribed  follow up with cardiology outpatient       Diagnosis: Hypertension  Assessment and Plan of Treatment: Low salt diet  Activity as tolerated.  Take all medication as prescribed.  Follow up with your medical doctor for routine blood pressure monitoring at your next visit.  Notify your doctor if you have any of the following symptoms:   Dizziness, Lightheadedness, Blurry vision, Headache, Chest pain, Shortness of breath      Diagnosis: Type 2 diabetes mellitus  Assessment and Plan of Treatment: HgA1C this admission was 7.8.  Make sure you get your HgA1c checked every three months.  If you take oral diabetes medications, check your blood glucose two times a day.  If you take insulin, check your blood glucose before meals and at bedtime.  It's important not to skip any meals.  follow up with endocrine outpatient , continue medication as prescribed  Prandin dose may need to be adjusted depending on steroid dosage/discontinuation, if any changes to prednisone, contact endocrine       PRINCIPAL DISCHARGE DIAGNOSIS  Diagnosis: Transaminitis  Assessment and Plan of Treatment: You had an abdominal ultrasound, CT A/P and MRI abdomen that did not an etiology for the elevated liver enzymes  Continue prednisone as prescribed as your liver enzymes improved after it was started  Follow up with hepatology clinic outpatient, [phone].  You may need a liver biopsy.      SECONDARY DISCHARGE DIAGNOSES  Diagnosis: Urinary tract infection  Assessment and Plan of Treatment: You had completed your course of antibiotics.   Now resolved      Diagnosis: Metastatic primary lung cancer  Assessment and Plan of Treatment: Follow up with Dr Sudhakar Cantor outpatient for continuity of care.  do not resume keytruda before discussing with dr cantor    Diagnosis: Paroxysmal atrial fibrillation  Assessment and Plan of Treatment: Continue Eliquis as prescribed  Atrial fibrillation is the most common heart rhythm problem & has the risk of stroke & heart attack  It helps if you control your blood pressure, not drink more than 1-2 alcohol drinks per day, cut down on caffeine, getting treatment for over active thyroid gland, & getting exercise  Call your doctor if you feel your heart racing or beating unusually, chest tightness or pain, lightheaded, faint, shortness of breath especially with exercise  It is important to take your heart medication as prescribed  follow up with cardiology outpatient   Do NOT resume aspirin       Diagnosis: Hypertension  Assessment and Plan of Treatment: Low salt diet  Activity as tolerated.  Take all medication as prescribed.  Follow up with your medical doctor for routine blood pressure monitoring at your next visit.  Notify your doctor if you have any of the following symptoms:   Dizziness, Lightheadedness, Blurry vision, Headache, Chest pain, Shortness of breath      Diagnosis: Type 2 diabetes mellitus  Assessment and Plan of Treatment: HgA1C this admission was 7.8.  Make sure you get your HgA1c checked every three months.  If you take oral diabetes medications, check your blood glucose two times a day.  If you take insulin, check your blood glucose before meals and at bedtime.  It's important not to skip any meals.  follow up with endocrine outpatient , continue medication as prescribed  Prandin dose may need to be adjusted depending on steroid dosage/discontinuation, if any changes to prednisone, contact endocrine       PRINCIPAL DISCHARGE DIAGNOSIS  Diagnosis: Transaminitis  Assessment and Plan of Treatment: You had an abdominal ultrasound, CT A/P and MRI abdomen that did not an etiology for the elevated liver enzymes  Continue prednisone as prescribed as your liver enzymes improved after it was started  Follow up with hepatology clinic outpatient, [phone].  You may need a liver biopsy.      SECONDARY DISCHARGE DIAGNOSES  Diagnosis: Urinary tract infection  Assessment and Plan of Treatment: You had completed your course of antibiotics.   Now resolved      Diagnosis: Metastatic primary lung cancer  Assessment and Plan of Treatment: Follow up with Dr Sudhakar Cantor outpatient for continuity of care.  do not resume keytruda before discussing with dr cantor    Diagnosis: Paroxysmal atrial fibrillation  Assessment and Plan of Treatment: Continue Eliquis as prescribed  Atrial fibrillation is the most common heart rhythm problem & has the risk of stroke & heart attack  It helps if you control your blood pressure, not drink more than 1-2 alcohol drinks per day, cut down on caffeine, getting treatment for over active thyroid gland, & getting exercise  Call your doctor if you feel your heart racing or beating unusually, chest tightness or pain, lightheaded, faint, shortness of breath especially with exercise  It is important to take your heart medication as prescribed  follow up with cardiology outpatient   Do NOT resume aspirin       Diagnosis: Hypertension  Assessment and Plan of Treatment: Low salt diet  Activity as tolerated.  Take all medication as prescribed.  Follow up with your medical doctor for routine blood pressure monitoring at your next visit.  Notify your doctor if you have any of the following symptoms:   Dizziness, Lightheadedness, Blurry vision, Headache, Chest pain, Shortness of breath      Diagnosis: Type 2 diabetes mellitus  Assessment and Plan of Treatment: HgA1C this admission was 7.8.  Make sure you get your HgA1c checked every three months.  If you take oral diabetes medications, check your blood glucose two times a day.  If you take insulin, check your blood glucose before meals and at bedtime.  It's important not to skip any meals.  follow up with endocrine outpatient , continue medication as prescribed  Prandin dose may need to be adjusted depending on steroid dosage/discontinuation, if any changes to prednisone, contact endocrine

## 2023-12-18 NOTE — DISCHARGE NOTE NURSING/CASE MANAGEMENT/SOCIAL WORK - NSDCFUADDAPPT_GEN_ALL_CORE_FT
APPTS ARE READY TO BE MADE: [x] YES    Best Family or Patient Contact (if needed):    Additional Information about above appointments (if needed):    1: Follow up with PCP Dr. Uribe outpatient  2: Follow up with oncology Dr. Ramon outpatient   3: Follow up with hepatology clinic outpatient   4: Follow up with cardiology Dr. Alcaraz outpatient   5: Follow up with ENT outpatient   6: Follow up with endocrine Dr. Tovar outpatient      Other comments or requests:

## 2023-12-18 NOTE — DISCHARGE NOTE NURSING/CASE MANAGEMENT/SOCIAL WORK - PATIENT PORTAL LINK FT
You can access the FollowMyHealth Patient Portal offered by [facility] by registering at the following website: [email or web address]. By joining [employer]’s FollowMyHealth portal, you will also be able to view your health information using other applications (apps) compatible with our system. You can access the FollowMyHealth Patient Portal offered by [facility] by registering at the following website: [email or web address]. By joining [employer]’s FollowMyHealth portal, you will also be able to view your health information using other applications (apps) compatible with our system.

## 2023-12-18 NOTE — PROGRESS NOTE ADULT - SUBJECTIVE AND OBJECTIVE BOX
DATE OF SERVICE: 12-18-23 @ 10:59    Patient is a 83y old  Female who presents with a chief complaint of Elevated liver enzymes (18 Dec 2023 10:47)      SUBJECTIVE / OVERNIGHT EVENTS:  No chest pain. No shortness of breath. No complaints. No events overnight.     MEDICATIONS  (STANDING):  apixaban 2.5 milliGRAM(s) Oral every 12 hours  artificial tears (preservative free) Ophthalmic Solution 1 Drop(s) Both EYES three times a day  bisacodyl Suppository 10 milliGRAM(s) Rectal once  cholecalciferol 1000 Unit(s) Oral daily  dextrose 5%. 1000 milliLiter(s) (50 mL/Hr) IV Continuous <Continuous>  dextrose 5%. 1000 milliLiter(s) (100 mL/Hr) IV Continuous <Continuous>  dextrose 50% Injectable 12.5 Gram(s) IV Push once  dextrose 50% Injectable 25 Gram(s) IV Push once  dextrose 50% Injectable 25 Gram(s) IV Push once  gabapentin 300 milliGRAM(s) Oral at bedtime  gabapentin 100 milliGRAM(s) Oral daily  glucagon  Injectable 1 milliGRAM(s) IntraMuscular once  insulin glargine Injectable (LANTUS) 25 Unit(s) SubCutaneous at bedtime  insulin lispro (ADMELOG) corrective regimen sliding scale   SubCutaneous three times a day before meals  insulin lispro (ADMELOG) corrective regimen sliding scale   SubCutaneous at bedtime  insulin lispro Injectable (ADMELOG) 15 Unit(s) SubCutaneous three times a day before meals  levETIRAcetam 1000 milliGRAM(s) Oral two times a day  metoprolol tartrate 50 milliGRAM(s) Oral two times a day  predniSONE   Tablet 40 milliGRAM(s) Oral daily  traMADol 50 milliGRAM(s) Oral three times a day    MEDICATIONS  (PRN):  Biotene Dry Mouth Oral Rinse 5 milliLiter(s) Swish and Spit four times a day PRN Mouth Care  dextrose Oral Gel 15 Gram(s) Oral once PRN Blood Glucose LESS THAN 70 milliGRAM(s)/deciliter      Vital Signs Last 24 Hrs  T(C): 36.9 (18 Dec 2023 08:49), Max: 37 (17 Dec 2023 20:42)  T(F): 98.5 (18 Dec 2023 08:49), Max: 98.6 (17 Dec 2023 20:42)  HR: 56 (18 Dec 2023 08:49) (53 - 74)  BP: 133/82 (18 Dec 2023 08:49) (125/70 - 143/63)  BP(mean): --  RR: 18 (18 Dec 2023 08:49) (18 - 18)  SpO2: 98% (18 Dec 2023 08:49) (94% - 99%)    Parameters below as of 18 Dec 2023 08:49  Patient On (Oxygen Delivery Method): room air      CAPILLARY BLOOD GLUCOSE      POCT Blood Glucose.: 170 mg/dL (18 Dec 2023 08:48)  POCT Blood Glucose.: 312 mg/dL (17 Dec 2023 21:30)  POCT Blood Glucose.: 436 mg/dL (17 Dec 2023 17:26)  POCT Blood Glucose.: 411 mg/dL (17 Dec 2023 17:21)  POCT Blood Glucose.: 529 mg/dL (17 Dec 2023 12:41)  POCT Blood Glucose.: 504 mg/dL (17 Dec 2023 12:39)    I&O's Summary    17 Dec 2023 07:01  -  18 Dec 2023 07:00  --------------------------------------------------------  IN: 620 mL / OUT: 0 mL / NET: 620 mL    18 Dec 2023 07:01  -  18 Dec 2023 10:59  --------------------------------------------------------  IN: 290 mL / OUT: 0 mL / NET: 290 mL        PHYSICAL EXAM:  GENERAL: NAD, well-developed  HEAD:  Atraumatic, Normocephalic  EYES: EOMI, PERRLA, conjunctiva and sclera clear  NECK: Supple, No JVD  CHEST/LUNG: Clear to auscultation bilaterally; No wheeze  HEART: Regular rate and rhythm; No murmurs, rubs, or gallops  ABDOMEN: Soft, Nontender, Nondistended; Bowel sounds present  EXTREMITIES:  2+ Peripheral Pulses, No clubbing, cyanosis, or edema  PSYCH: AAOx3  NEUROLOGY: non-focal  SKIN: No rashes or lesions    LABS:                        12.0   9.24  )-----------( 254      ( 18 Dec 2023 06:31 )             36.8     12-18    142  |  102  |  30<H>  ----------------------------<  194<H>  3.9   |  25  |  1.21    Ca    9.5      18 Dec 2023 06:30  Phos  2.6     12-18  Mg     1.5     12-18    TPro  6.1  /  Alb  3.3  /  TBili  1.8<H>  /  DBili  x   /  AST  313<H>  /  ALT  409<H>  /  AlkPhos  643<H>  12-18    PT/INR - ( 18 Dec 2023 07:39 )   PT: 14.5 sec;   INR: 1.39 ratio         PTT - ( 18 Dec 2023 07:39 )  PTT:32.6 sec      Urinalysis Basic - ( 18 Dec 2023 06:30 )    Color: x / Appearance: x / SG: x / pH: x  Gluc: 194 mg/dL / Ketone: x  / Bili: x / Urobili: x   Blood: x / Protein: x / Nitrite: x   Leuk Esterase: x / RBC: x / WBC x   Sq Epi: x / Non Sq Epi: x / Bacteria: x        RADIOLOGY & ADDITIONAL TESTS:    Imaging Personally Reviewed:    Consultant(s) Notes Reviewed:      Care Discussed with Consultants/Other Providers:

## 2023-12-18 NOTE — DISCHARGE NOTE PROVIDER - NSFOLLOWUPCLINICS_GEN_ALL_ED_FT
[facility] - ENT  Otolaryngology (ENT)  [address] [place] [address]  Phone: [phone]  Fax:     Gastroenterology at [facility]  Gastroenterology  [address] [place] [address]  Phone: [phone]  Fax:     [facility] Gastroenterology  Gastroenterology  [address] [address]  Phone: [phone]  Fax:      [facility] - ENT  Otolaryngology (ENT)  [address] [place] [address]  Phone: [phone]  Fax:     Gastroenterology at [facility]  Gastroenterology  [address] [place] [address]  Phone: [phone]  Fax:     [facility] Gastroenterology  Gastroenterology  [address] [address]  Phone: [phone]  Fax:

## 2023-12-18 NOTE — PROGRESS NOTE ADULT - NS ATTEND AMEND GEN_ALL_CORE FT
Chart, labs, vitals, radiology reviewed. Above H&P reviewed and edited where appropriate. Agree with history and physical exam. Agree with assessment and plan. I reviewed the overnight course of events and discussed the care with the patient/ family.  All the decisions in assessment and plan are made by me
Chart, labs, vitals, radiology reviewed. Above H&P reviewed and edited where appropriate. Agree with history and physical exam. Agree with assessment and plan. I reviewed the overnight course of events and discussed the care with the patient/ family.  All the decisions in assessment and plan are made by me.

## 2023-12-18 NOTE — PROGRESS NOTE ADULT - TIME BILLING
Agree with above NP note.  events noted  cv stable  cont current tx  cont a/c  asa 81 if no neurosx CI
Agree with above PA note.  cv stable  cont current tx  cont a/c  remains off antiplat for cad/pci in light of prev history of craniotomy/brain mass/ICH  consider starting asa 81 if no neuro/neurosx CI after biopsy(pt optimized)  echo with overall preserved EF
Agree with above PA note.  cv stable  cont current tx  hold ac if biopsy planned  remains off antiplat for cad/pci in light of prev history of craniotomy/brain mass/ICH  consider starting asa 81 if no neuro/neurosx CI after biopsy(pt optimized), hold if biopsy planned  echo with overall preserved EF
Agree with above PA note.  events noted  cv stable  cont current tx  cont a/c  asa 81 if no neurosx CI
Agree with above PA note.  events noted  cv stable  cont current tx  cont a/c
Agree with above PA note.  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
Patient seen and examined.  Agree with above PA note.  cv stable   cont current tc  dcp  cont a/c   neurosx eval noted  in light of hx of ich/neurosx/malignancy and a/c use will hold ASA for now to minimize risk of ICH  patient without recent chest pain  d/w son lino via phone who agrees with holding ASA

## 2023-12-21 ENCOUNTER — APPOINTMENT (OUTPATIENT)
Dept: HEMATOLOGY ONCOLOGY | Facility: CLINIC | Age: 83
End: 2023-12-21

## 2023-12-21 ENCOUNTER — APPOINTMENT (OUTPATIENT)
Dept: INFUSION THERAPY | Facility: HOSPITAL | Age: 83
End: 2023-12-21

## 2024-01-02 ENCOUNTER — RESULT REVIEW (OUTPATIENT)
Age: 84
End: 2024-01-02

## 2024-01-02 ENCOUNTER — APPOINTMENT (OUTPATIENT)
Dept: HEMATOLOGY ONCOLOGY | Facility: CLINIC | Age: 84
End: 2024-01-02
Payer: MEDICARE

## 2024-01-02 ENCOUNTER — NON-APPOINTMENT (OUTPATIENT)
Age: 84
End: 2024-01-02

## 2024-01-02 VITALS
DIASTOLIC BLOOD PRESSURE: 79 MMHG | SYSTOLIC BLOOD PRESSURE: 119 MMHG | OXYGEN SATURATION: 100 % | TEMPERATURE: 97.1 F | BODY MASS INDEX: 27.42 KG/M2 | WEIGHT: 152.34 LBS | RESPIRATION RATE: 16 BRPM | HEART RATE: 69 BPM

## 2024-01-02 LAB
BASOPHILS # BLD AUTO: 0.01 K/UL — SIGNIFICANT CHANGE UP (ref 0–0.2)
BASOPHILS # BLD AUTO: 0.01 K/UL — SIGNIFICANT CHANGE UP (ref 0–0.2)
BASOPHILS NFR BLD AUTO: 0.1 % — SIGNIFICANT CHANGE UP (ref 0–2)
BASOPHILS NFR BLD AUTO: 0.1 % — SIGNIFICANT CHANGE UP (ref 0–2)
EOSINOPHIL # BLD AUTO: 0 K/UL — SIGNIFICANT CHANGE UP (ref 0–0.5)
EOSINOPHIL # BLD AUTO: 0 K/UL — SIGNIFICANT CHANGE UP (ref 0–0.5)
EOSINOPHIL NFR BLD AUTO: 0 % — SIGNIFICANT CHANGE UP (ref 0–6)
EOSINOPHIL NFR BLD AUTO: 0 % — SIGNIFICANT CHANGE UP (ref 0–6)
HCT VFR BLD CALC: 37.4 % — SIGNIFICANT CHANGE UP (ref 34.5–45)
HCT VFR BLD CALC: 37.4 % — SIGNIFICANT CHANGE UP (ref 34.5–45)
HGB BLD-MCNC: 12.6 G/DL — SIGNIFICANT CHANGE UP (ref 11.5–15.5)
HGB BLD-MCNC: 12.6 G/DL — SIGNIFICANT CHANGE UP (ref 11.5–15.5)
IMM GRANULOCYTES NFR BLD AUTO: 0.8 % — SIGNIFICANT CHANGE UP (ref 0–0.9)
IMM GRANULOCYTES NFR BLD AUTO: 0.8 % — SIGNIFICANT CHANGE UP (ref 0–0.9)
LYMPHOCYTES # BLD AUTO: 0.66 K/UL — LOW (ref 1–3.3)
LYMPHOCYTES # BLD AUTO: 0.66 K/UL — LOW (ref 1–3.3)
LYMPHOCYTES # BLD AUTO: 7.2 % — LOW (ref 13–44)
LYMPHOCYTES # BLD AUTO: 7.2 % — LOW (ref 13–44)
MCHC RBC-ENTMCNC: 31.5 PG — SIGNIFICANT CHANGE UP (ref 27–34)
MCHC RBC-ENTMCNC: 31.5 PG — SIGNIFICANT CHANGE UP (ref 27–34)
MCHC RBC-ENTMCNC: 33.7 G/DL — SIGNIFICANT CHANGE UP (ref 32–36)
MCHC RBC-ENTMCNC: 33.7 G/DL — SIGNIFICANT CHANGE UP (ref 32–36)
MCV RBC AUTO: 93.5 FL — SIGNIFICANT CHANGE UP (ref 80–100)
MCV RBC AUTO: 93.5 FL — SIGNIFICANT CHANGE UP (ref 80–100)
MONOCYTES # BLD AUTO: 0.12 K/UL — SIGNIFICANT CHANGE UP (ref 0–0.9)
MONOCYTES # BLD AUTO: 0.12 K/UL — SIGNIFICANT CHANGE UP (ref 0–0.9)
MONOCYTES NFR BLD AUTO: 1.3 % — LOW (ref 2–14)
MONOCYTES NFR BLD AUTO: 1.3 % — LOW (ref 2–14)
NEUTROPHILS # BLD AUTO: 8.32 K/UL — HIGH (ref 1.8–7.4)
NEUTROPHILS # BLD AUTO: 8.32 K/UL — HIGH (ref 1.8–7.4)
NEUTROPHILS NFR BLD AUTO: 90.6 % — HIGH (ref 43–77)
NEUTROPHILS NFR BLD AUTO: 90.6 % — HIGH (ref 43–77)
NRBC # BLD: 0 /100 WBCS — SIGNIFICANT CHANGE UP (ref 0–0)
NRBC # BLD: 0 /100 WBCS — SIGNIFICANT CHANGE UP (ref 0–0)
PLATELET # BLD AUTO: 255 K/UL — SIGNIFICANT CHANGE UP (ref 150–400)
PLATELET # BLD AUTO: 255 K/UL — SIGNIFICANT CHANGE UP (ref 150–400)
RBC # BLD: 4 M/UL — SIGNIFICANT CHANGE UP (ref 3.8–5.2)
RBC # BLD: 4 M/UL — SIGNIFICANT CHANGE UP (ref 3.8–5.2)
RBC # FLD: 15.3 % — HIGH (ref 10.3–14.5)
RBC # FLD: 15.3 % — HIGH (ref 10.3–14.5)
WBC # BLD: 9.18 K/UL — SIGNIFICANT CHANGE UP (ref 3.8–10.5)
WBC # BLD: 9.18 K/UL — SIGNIFICANT CHANGE UP (ref 3.8–10.5)
WBC # FLD AUTO: 9.18 K/UL — SIGNIFICANT CHANGE UP (ref 3.8–10.5)
WBC # FLD AUTO: 9.18 K/UL — SIGNIFICANT CHANGE UP (ref 3.8–10.5)

## 2024-01-02 PROCEDURE — 99215 OFFICE O/P EST HI 40 MIN: CPT

## 2024-01-02 RX ORDER — REPAGLINIDE 2 MG/1
2 TABLET ORAL
Refills: 0 | Status: ACTIVE | COMMUNITY

## 2024-01-02 RX ORDER — SEMAGLUTIDE 1.34 MG/ML
2 INJECTION, SOLUTION SUBCUTANEOUS
Refills: 0 | Status: ACTIVE | COMMUNITY

## 2024-01-02 RX ORDER — INSULIN DEGLUDEC INJECTION 100 U/ML
100 INJECTION, SOLUTION SUBCUTANEOUS
Refills: 0 | Status: ACTIVE | COMMUNITY

## 2024-01-02 RX ORDER — METFORMIN HYDROCHLORIDE 1000 MG/1
1000 TABLET, COATED ORAL
Refills: 0 | Status: ACTIVE | COMMUNITY

## 2024-01-02 RX ORDER — CHOLECALCIFEROL (VITAMIN D3) 25 MCG
TABLET ORAL
Refills: 0 | Status: ACTIVE | COMMUNITY

## 2024-01-02 NOTE — ASSESSMENT
[FreeTextEntry1] : Metastatic NSCLC with adenocarcinoma histology; tumor tested PDL1 Negative and lacks actionable mutations but tested TMB-High. Treated with GK-SRS for brain metastases in Sept 2022. Started first line single agent Pembrolizumab in Oct 2022; achieved DE. Restaging PET/CT Oct 2023 with sustained response. Restaging Brain MRI Nov 2023 with sustained intracranial response. 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  Now s/p hospitalization Dec 2023 and started on steroids with improvement in liver enzymes.   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  Patient is on prednisone 40 mg p.o. daily.  Follow-up with hepatology; currently scheduled for later this month.  If she continues to respond to the steroids, she would likely require a slow taper. - Repeat labs today to monitor the liver enzymes. -Back pain: Pt with h/o spinal stenosis. Follows with outside pain management Dr. Ryder Salazar ([phone]). Follows with [facility]. Takes gabapentin, Tramadol and medical cannabis.  Consider PMR evaluation - previously provided phone number for [facility] for her to schedule.   - Brain Metastases: F/U with Radiation oncology s/p GK-SRS to brain metastases. Follows with Neuro-Onc as well. On Keppra.  MRI Brain planned for March 2023.  - PE: Continue Eliquis anticoagulation indefinitely given PE and Afib. - Continue follow-up and management with her PCP/cardiologist for her other ongoing medical problems and comorbidities. -Iron-deficiency anemia:  s/p PO iron repletion.  Monitor hemoglobin and need for further iron repletion.   -Obtain PET/CT later this month to evaluate the status of her disease.  PET/CT can serve to evaluate the equivocal CT scan findings regarding the periportal LN's.  May be able to taper steroids to lower dosing prior to PET/CT to ensure glucose is satisfactory for the scan.   Will cancel other appointments other than her f/u appt with me later this month.

## 2024-01-02 NOTE — HISTORY OF PRESENT ILLNESS
[Disease: _____________________] : Disease: [unfilled] [AJCC Stage: ____] : AJCC Stage: [unfilled] [de-identified] : 82F former smoker with multiple comorbidities was undergoing orthopedic evaluation as an outpatient due to ongoing chronic back pain and gait imbalance.  She ultimately presented to [facility] ED on 7/19/2022.  Brain imaging demonstrated evidence of metastatic disease including a dominant right temporal lobe 3.1 cm enhancing mass with vasogenic edema.  CT scan of her body demonstrated a ANTHONY tumor with subcentimeter pulmonary nodules and a nonspecific adrenal nodule.  Bronchoscopy with biopsy performed on 7/20/2022 demonstrated adenocarcinoma consistent with lung primary; tumor tested PD-L1 negative and was negative for actionable mutations. however, tested TMB-High.  She underwent craniotomy resection of the dominant brain mass on 7/27/2022 with pathology revealing the same findings.  Hospital course was complicated by NSTEMI, Afib with RVR and RML PE.  She was put on medications for seizure prophylaxis.  She was subsequently discharged to acute rehab on 8/10-8/26/2022.  Treated with GK-SRS to brain metastases in Sept 2022. Started 1st line single agent Pembro in Oct 2022; achieved PA.  Patient was treated through early Nov 2023 when the immunotherapy was held after development of transaminitis and hyperbilirubinemia concering for ICI-induced hepatitis.   [de-identified] : - Left mainstem bronchus biopsy 7/20/2022: Poorly differentiated adenocarcinoma of lung origin.\par  PD-L1 negative/0%.\par  Foundation One:  No actionable mutations (TP53+, among others); TMB-High.  \par  -Right brain tumor resection 7/27/2022: Metastatic non-small cell carcinoma, favor adenocarcinoma, consistent with lung origin.\par  PD-L1 negative. [de-identified] : Patient started first-line single agent Pembrolizumab in Oct 2022; achieved DE.  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   Patient is status post hospital admission 12/9 - 12/18/2023 after presenting with elevated liver enzymes, originally noted by endocrinologist, which were worsening.  Abdominal ultrasound revealed a left renal cyst and otherwise was unremarkable.  Hepatology was consulted.  Acute hepatitis panel was negative.  CT abdomen and MRI abdomen were uninformative with regards to etiology of transaminitis.  Patient was started on steroids with prednisone 40 mg daily with improvement in the LFTs.  Liver biopsy was avoided.  She was treated for UTI during her hospital stay.  DM was medically managed given the addition of the steroids. She is feeling improved.  Pruritus is significantly better, although continues on her LE's.   Patient has longstanding chronic back pain that predates her diagnosis; she recently resumed medical cannabis and Tramadol with some improvement, although she still has pain when standing or walking and is unstready on her feet.  Now following with [facility] for symptom management.

## 2024-01-02 NOTE — GOALS
[FreeTextEntry1] : Dick Phan [FreeTextEntry2] : Son [FreeTextEntry3] : [phone] [FreeTextEntry7] :  Form provided.  Copy to be provided once completed

## 2024-01-02 NOTE — RESULTS/DATA
[FreeTextEntry1] : -CT Brain 7/18/22:  Cystic versus a necrotic masses measure approximately 4.8 x 3.2 x 3.2 cm in the right temporal lobe and 1.4 x 1.3 x 1.6 cm in the medial left frontal lobe.  Moderate to large amount of vasogenic edema in the right frontal, parietal and temporal lobes. Trace edema in the left frontal lobe.  Regional mass effect in the right cerebral hemisphere with partial effacement of the ventricular system and 5 mm subfalcine herniation of the right frontal lobe underneath the cerebral falx.  The findings may represent intracranial metastatic disease versus glioblastoma. Contrast-enhanced brain MRI is recommended for further evaluation.  -CT C/A/P 7/18/22:  Left upper lobe lung mass with partial atelectasis of the left upper lobe, concerning for primary lung neoplasm. Few scattered sub-4 mm pulmonary nodules, indeterminate.  Indeterminant left adrenal nodule for which contrast enhanced MRI is recommended for further evaluation.  -MRI Brain 7/20/22:  Right temporal lobe peripherally enhancing, centrally necrotic 3.1 x 2.6 cm mass demonstrating mild diffusion restriction. There is surrounding vasogenic edema. 8 mm leftward midline shift. Right uncal herniation. Additional left parafalcine 1.3 x 0.9 cm rim-enhancing cystic lesion with mild surrounding vasogenic edema. Tiny 0.3 x 0.3 cm rim-enhancing lesion within the left frontal cortex. Findings are concerning for metastatic disease in the context of patient's known primary lung cancer. The left lateral ventricle is mildly dilated.  -LE Dopplers 7/24/22:  No evidence of deep venous thrombosis in either lower extremity. -LE Dopplers 7/28/22:  No evidence of deep venous thrombosis in either lower extremity.  -CT Chest Angio 8/3/22:  Pulmonary embolism in the lateral segmental branch of the right middle lobe. No evidence of right heart strain.  Interval decrease in left upper lobe mass.  Unchanged obstruction of the posterior bronchus, and stable fibroglandular lymph node.  -Abdominal U/S 8/4/22:   Mildly distended gallbladder. No evidence of acute cholecystitis.  Possible mild hydronephrosis.  -LE Dopplers 8/7/22:  No evidence of deep venous thrombosis in either lower extremity.  -LE Dopplers 9/1/22:  No evidence of deep venous thrombosis in either lower extremity.  -PET/CT 9/9/22:  Abnormal FDG-PET/CT scan. 1. FDG-avid left upper lobe lung mass abutting the mediastinum at level of AP window, with associated atelectasis along the mediastinum, unchanged as compared to CT dated 8/3/2022, corresponds to patient's known adenocarcinoma. 2. Right temporal craniotomy with photopenic low attenuation within right temporal lobe, compatible with vasogenic edema, gliosis, and/or postsurgical changes, better evaluated on contrast-enhanced CT or MRI. 3. Findings suggestive of left vocal cord paralysis, likely secondary to left upper lobe lung mass. Please correlate clinically and with direct visualization, as indicated. 4. Nonspecific hypermetabolism in small bowel and colon likely is related to metformin. 5. No scan evidence of metastatic disease.  -PET/CT 12/8/22:  1. FDG-avid left upper lobe lung nodule is decreased in size and metabolism, compatible with a partial response to interval therapy. Interval decrease in adjacent left upper lobe paramediastinal opacity, likely atelectasis, which is unchanged in metabolism. 2. Right temporal craniotomy with photopenic low attenuation within right temporal lobe, compatible with vasogenic edema, gliosis, and/or postsurgical changes, better evaluated on contrast-enhanced CT or MRI, not significantly changed. 3. Findings suggestive of left vocal cord paralysis, likely secondary to left upper lobe lung mass, unchanged. 4. Nonspecific hypermetabolism in small bowel and colon, unchanged, likely related to metformin. 5. No scan evidence of metastatic disease.  -MRI 12/8/22:  Interval decrease in size and peripheral enhancement of the medial left frontal lesion. Smaller ring-enhancing lesion more laterally along the left frontal cortex no longer visualized.  No new lesions seen.  Postsurgical cavity right temporal lobe with mild adjacent enhancement. Continued follow-up recommended.  -MRI Brain 2/23/23:  Stable medial left frontal lobe nonenhancing cystic lesion. Stable linear enhancement medial to the right temporal lobe surgical cavity. No new areas abnormal enhancement. Continued follow-up is recommended  -PET/CT 3/2/23:  Compared to FDG PET/CT dated 12/8/2022: 1. FDG-avid left upper lobe lung nodule is not significantly changed in size or metabolism, compatible with stable disease. Interval decrease in size and metabolism of adjacent left upper lobe opacity abutting the mediastinum. 2. Nonspecific FDG-avid skin thickening, bilateral mid/lower anterior abdominal wall with new associated subcutaneous stranding, and increased in metabolism, likely secondary to inflammation. Please correlate clinically. 3. Right temporal craniotomy with photopenic low attenuation within right temporal lobe, compatible with vasogenic edema, gliosis, and/or postsurgical changes, better evaluated on contrast-enhanced CT or MRI, not significantly changed. 4. Nonspecific pancolonic hypermetabolism, not significantly changed, likely is related to metformin. 5. No scan evidence of metastatic disease.  -MRI Brain 6/15/23:  Stable right temporal postoperative changes. Stable subcentimeter focus of enhancement along the medial aspect of the cavity, likely posttreatment changes. No new or worsening enhancement identified.  -PET/CT 6/22/23:  Compared to FDG PET/CT dated 3/2/2023: 1. Mildly FDG-avid left upper lobe lung nodule is not significantly changed in size or metabolism, compatible with stable disease. A minimally FDG-avid adjacent left upper lobe opacity abutting the mediastinum is decreased in size and unchanged and avidity. 2. Right temporal craniotomy with photopenic low attenuation within right temporal lobe, compatible with posttreatment changes, unchanged. 3. Nonspecific diffuse distal small bowel hypermetabolism, increased as compared to prior study, and pancolonic hypermetabolism, decreased as compared to prior study, without corresponding abnormalities on CT, likely are related to metformin. 4. Nonspecific mildly FDG-avid skin thickening and subcutaneous stranding, bilateral mid/lower anterior abdominal wall, decreased as compared to prior study. Please correlate clinically. 5. Remainder of study demonstrates no evidence of FDG-avid disease.  -MRI Brain 7/16/23: No significant interval change from 6/15/2023.  Similar-appearing postsurgical encephalomalacia and gliosis in the right mid temporal lobe. Similar 5 mm focus of enhancement along the mesial aspect of the surgical cavity, likely postsurgical in nature. No new abnormal parenchymal or leptomeningeal enhancement. Similar nonspecific T2 and FLAIR hyperintense signal surrounding the surgical cavity, likely representing gliosis.  -CT PERFUSION 7/16/23: Perfusion abnormality localized to hypodense region in the temporal lobe, related to postoperative changes rather than true perfusion defect. No evidence of core infarct or ischemic penumbra otherwise.  -CT ANGIOGRAPHY NECK 7/16/23: Patent vasculature without occlusion. Changes of prior right carotid endarterectomy. Marked narrowing and luminal irregularity of the proximal right internal carotid artery just beyond the origin with limited assessment of stenosis by NASCET criteria given the overall diminutive caliber to the cervical right internal carotid artery. Moderate stenosis origin left internal carotid artery by NASCET criteria. No critical stenosis or dissection.  -CT ANGIOGRAPHY BRAIN 7/16/23: No vessel occlusion, flow-limiting stenosis or aneurysm. NASCET CAROTID STENOSIS CRITERIA (distal normal appearing ICA as denominator for measurement): 0%-none, 1-49%-mild, 50-70%-moderate, 70-89%-severe, 90-99%-critical.  -MRI Brain 7/17/23:  No significant interval change from 6/15/2023.  Similar-appearing postsurgical encephalomalacia and gliosis in the right mid temporal lobe.  Similar 5 mm focus of enhancement along the mesial aspect of the surgical cavity, likely postsurgical in nature.  No new abnormal parenchymal or leptomeningeal enhancement.  Similar nonspecific T2 and FLAIR hyperintense signal surrounding the surgical cavity, likely representing gliosis.  -PET/CT 10/5/23:  Compared to FDG PET/CT dated 6/22/2023: 1. Small difficult to delineate FDG-avid focus in left upper lobe paramediastinal region is further decreased in size and metabolism. Small focus of residual disease is not excluded. Minimally FDG-avid linear left upper lobe opacity abutting the mediastinum, not significantly changed, may represent a combination of atelectasis and treated disease. 2. Nonspecific diffuse distal small bowel and pancolonic hypermetabolism, not significantly changed, likely is related to metformin. 3. Nonspecific mildly FDG-avid skin thickening and subcutaneous stranding, bilateral mid/lower anterior abdominal wall, decreased in thickness and unchanged in metabolism. Please correlate clinically. 4. Remainder of study demonstrates no evidence of FDG-avid disease. No new lesion.  -MRI Brain 11/2/23: stable exam compared to 7/17/2023  Images Reviewed/Interpreted:  -US Abdomen 11/16/23:  Left renal cyst. Otherwise, unremarkable abdominal sonogram.  -CT Abdomen/Pelvis 12/9/23:  MIld fat stranding surrounding the 1st and 2nd portions of the duodenum of uncertain etiology, however raising the possibility of duodenal inflammation.  Periportal lymph node, new since July 18, 2022.  -MR Abdomen 12/11/23:   * Mild periportal edema. Otherwise unremarkable appearance of the liver. * No biliary duct dilation. * Mild nonspecific edematous gallbladder wall thickening, potentially reactive to the liver. Consider correlation with right upper quadrant ultrasound as warranted. * Nonspecific periportal lymphadenopathy.  -MBS 12/15/23:  There was no evidence of penetration or aspiration with the tested consistencies.  -CT Head 12/17/23:  Stable exam. No acute intracranial hemorrhage, mass effect, or midline shift.  Stable postsurgical changes in the right temporal bone, with subadjacent encephalomalacia and gliosis.  -Labs prior to hospital discharge 12/18/2023: CBC normal.  Serum creatinine 1.2.  Total bilirubin down to 1.8 (from high of 5.9).  Alkaline phosphatase 643, , .  All transaminases decreasing.

## 2024-01-02 NOTE — PHYSICAL EXAM
[Ambulatory and capable of all self care but unable to carry out any work activities] : Status 2- Ambulatory and capable of all self care but unable to carry out any work activities. Up and about more than 50% of waking hours [Normal] : affect appropriate [de-identified] : Elderly woman in wheelchair in mild emotional distress [de-identified] : No icterus  [de-identified] : MMM O/P Clear  [de-identified] : No LAD  [de-identified] : Clear  [de-identified] : S1 S2 [de-identified] : No edema [de-identified] : No spine/CVA tenderness [de-identified] : Scattered erythema secondary to scratching. No rash noted

## 2024-01-02 NOTE — REASON FOR VISIT
[Follow-Up Visit] : a follow-up [Formal Caregiver] : formal caregiver [Other: _____] : [unfilled] [FreeTextEntry2] : Lung Cancer

## 2024-01-03 ENCOUNTER — NON-APPOINTMENT (OUTPATIENT)
Age: 84
End: 2024-01-03

## 2024-01-05 ENCOUNTER — NON-APPOINTMENT (OUTPATIENT)
Age: 84
End: 2024-01-05

## 2024-01-05 LAB
ALBUMIN SERPL ELPH-MCNC: 4 G/DL
ALP BLD-CCNC: 234 U/L
ALT SERPL-CCNC: 46 U/L
ANION GAP SERPL CALC-SCNC: 22 MMOL/L
AST SERPL-CCNC: 24 U/L
BILIRUB DIRECT SERPL-MCNC: 0.7 MG/DL
BILIRUB INDIRECT SERPL-MCNC: 0.5 MG/DL
BILIRUB SERPL-MCNC: 1.2 MG/DL
BUN SERPL-MCNC: 32 MG/DL
CALCIUM SERPL-MCNC: 9.7 MG/DL
CHLORIDE SERPL-SCNC: 93 MMOL/L
CO2 SERPL-SCNC: 21 MMOL/L
CREAT SERPL-MCNC: 0.86 MG/DL
EGFR: 67 ML/MIN/1.73M2
GLUCOSE SERPL-MCNC: 521 MG/DL
POTASSIUM SERPL-SCNC: 4.4 MMOL/L
PROT SERPL-MCNC: 6.2 G/DL
SODIUM SERPL-SCNC: 136 MMOL/L

## 2024-01-11 ENCOUNTER — APPOINTMENT (OUTPATIENT)
Dept: INFUSION THERAPY | Facility: HOSPITAL | Age: 84
End: 2024-01-11

## 2024-01-11 ENCOUNTER — APPOINTMENT (OUTPATIENT)
Dept: HEMATOLOGY ONCOLOGY | Facility: CLINIC | Age: 84
End: 2024-01-11

## 2024-01-17 PROBLEM — R74.8 ELEVATED LIVER ENZYMES: Status: ACTIVE | Noted: 2023-12-07

## 2024-01-18 ENCOUNTER — APPOINTMENT (OUTPATIENT)
Dept: HEPATOLOGY | Facility: CLINIC | Age: 84
End: 2024-01-18
Payer: MEDICARE

## 2024-01-18 VITALS
HEART RATE: 80 BPM | OXYGEN SATURATION: 98 % | RESPIRATION RATE: 16 BRPM | HEIGHT: 62.5 IN | TEMPERATURE: 97.3 F | WEIGHT: 151 LBS | DIASTOLIC BLOOD PRESSURE: 78 MMHG | BODY MASS INDEX: 27.09 KG/M2 | SYSTOLIC BLOOD PRESSURE: 114 MMHG

## 2024-01-18 DIAGNOSIS — R74.8 ABNORMAL LEVELS OF OTHER SERUM ENZYMES: ICD-10-CM

## 2024-01-18 PROCEDURE — 99213 OFFICE O/P EST LOW 20 MIN: CPT

## 2024-01-18 NOTE — PHYSICAL EXAM
[General Appearance - Alert] : alert [General Appearance - In No Acute Distress] : in no acute distress [General Appearance - Well Nourished] : well nourished [General Appearance - Well Developed] : well developed [Sclera] : the sclera and conjunctiva were normal [Extraocular Movements] : extraocular movements were intact [Respiration, Rhythm And Depth] : normal respiratory rhythm and effort [Exaggerated Use Of Accessory Muscles For Inspiration] : no accessory muscle use [Auscultation Breath Sounds / Voice Sounds] : lungs were clear to auscultation bilaterally [Heart Sounds] : normal S1 and S2 [Bowel Sounds] : normal bowel sounds [Abdomen Soft] : soft [Abdomen Tenderness] : non-tender [Nail Clubbing] : no clubbing  or cyanosis of the fingernails [Musculoskeletal - Swelling] : no joint swelling seen [Skin Color & Pigmentation] : normal skin color and pigmentation [Skin Turgor] : normal skin turgor [] : no rash [No Focal Deficits] : no focal deficits [Oriented To Time, Place, And Person] : oriented to person, place, and time [Impaired Insight] : insight and judgment were intact [Affect] : the affect was normal [Mood] : the mood was normal [Scleral Icterus] : No Scleral Icterus [Abdominal  Ascites] : no ascites [Jaundice] : No jaundice [FreeTextEntry1] : Walking with a rolling walker

## 2024-01-18 NOTE — REASON FOR VISIT
[Follow-Up: _____] : a [unfilled] follow-up visit [Formal Caregiver] : formal caregiver [Family Member] : family member [FreeTextEntry1] : ICI hepatitis

## 2024-01-18 NOTE — ASSESSMENT
[FreeTextEntry1] : Ms. 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 Was admitted and treated with steroid. Pembro has been held for over 2 months.  On prednisone taper at 35mg now and liver chemistries improving. Will repeat labs today. Will call with lab results. Plan for continued pred taper as long as enzymes continue to trend down. Continue to hold Statins until liver enzymes improve.   RTO in 8 weeks.

## 2024-01-18 NOTE — HISTORY OF PRESENT ILLNESS
[FreeTextEntry1] : Ms. Phan is a 84y/o Lady with a history of NSTEMI, Afib with RVR (on eliquis), metastatic NSCLC diagnosed in the summer of 2022. She was undergoing work up for chronic back pain when she was incidentally noted to have brain lesion and subsequently found to have pulm nodules. Bronch with biopsy showed adenocarcinoma consistent with lung primary. She is s/p craniotomy resection of the dominant brain mass on 7/27/2022 with pathology consistent with metastatic disease. She is also s/p radiation therapy to her brain mets with good response. Was stared on pembrolizumab in October, 2022 and tolerated it well however, she recently developed elevated liver enzymes presumed due to ICI hepatitis. She was admitted after her last visit with me as her enzymes continued to trend up with mild liver dysfunction.  Pembro has been held for over 2 months.  Today, she is accompanied by her son Destin and her Aide.  She reports feeling fine has no complaints.  Denies any abdominal pain, nausea, vomiting. Her urine color is normal. She is eating well.  Current meds: Eliquis, Atorvastatin, Gabapentin 100/300mg, Ozempic 0.25 weekly and Insulin, Tramadol 50mg TID, CBD oil B12, D3, keppra for seizures, Metformin 500mg BID, metoprolol 25mg BID, prednisone 35mg, repaglinide 2mg

## 2024-01-19 LAB
ALBUMIN SERPL ELPH-MCNC: 4 G/DL
ALP BLD-CCNC: 104 U/L
ALT SERPL-CCNC: 17 U/L
ANION GAP SERPL CALC-SCNC: 13 MMOL/L
AST SERPL-CCNC: 15 U/L
BILIRUB SERPL-MCNC: 0.6 MG/DL
BUN SERPL-MCNC: 33 MG/DL
CALCIUM SERPL-MCNC: 10.2 MG/DL
CHLORIDE SERPL-SCNC: 98 MMOL/L
CO2 SERPL-SCNC: 25 MMOL/L
CREAT SERPL-MCNC: 1.14 MG/DL
EGFR: 48 ML/MIN/1.73M2
GLUCOSE SERPL-MCNC: 325 MG/DL
INR PPP: 1.07 RATIO
POTASSIUM SERPL-SCNC: 5.2 MMOL/L
PROT SERPL-MCNC: 5.9 G/DL
PT BLD: 12.1 SEC
SODIUM SERPL-SCNC: 137 MMOL/L

## 2024-01-22 RX ORDER — PREDNISONE 5 MG/1
5 TABLET ORAL
Qty: 50 | Refills: 0 | Status: ACTIVE | COMMUNITY
Start: 2024-01-05 | End: 1900-01-01

## 2024-01-24 ENCOUNTER — OUTPATIENT (OUTPATIENT)
Dept: OUTPATIENT SERVICES | Facility: HOSPITAL | Age: 84
LOS: 1 days | Discharge: HOME | End: 2024-01-24

## 2024-01-24 DIAGNOSIS — I73.9 PERIPHERAL VASCULAR DISEASE, UNSPECIFIED: Chronic | ICD-10-CM

## 2024-01-24 DIAGNOSIS — C34.90 MALIGNANT NEOPLASM OF UNSPECIFIED PART OF UNSPECIFIED BRONCHUS OR LUNG: ICD-10-CM

## 2024-01-24 DIAGNOSIS — I25.10 ATHEROSCLEROTIC HEART DISEASE OF NATIVE CORONARY ARTERY WITHOUT ANGINA PECTORIS: Chronic | ICD-10-CM

## 2024-01-25 ENCOUNTER — APPOINTMENT (OUTPATIENT)
Dept: NUCLEAR MEDICINE | Facility: CLINIC | Age: 84
End: 2024-01-25
Payer: MEDICARE

## 2024-01-25 PROCEDURE — A9552: CPT

## 2024-01-25 PROCEDURE — 78815 PET IMAGE W/CT SKULL-THIGH: CPT | Mod: PS,KX

## 2024-01-25 RX ORDER — PREDNISONE 20 MG/1
20 TABLET ORAL
Qty: 20 | Refills: 0 | Status: ACTIVE | COMMUNITY
Start: 1900-01-01 | End: 1900-01-01

## 2024-01-30 ENCOUNTER — APPOINTMENT (OUTPATIENT)
Dept: INFUSION THERAPY | Facility: HOSPITAL | Age: 84
End: 2024-01-30

## 2024-02-02 ENCOUNTER — RESULT REVIEW (OUTPATIENT)
Age: 84
End: 2024-02-02

## 2024-02-02 ENCOUNTER — APPOINTMENT (OUTPATIENT)
Dept: HEMATOLOGY ONCOLOGY | Facility: CLINIC | Age: 84
End: 2024-02-02
Payer: MEDICARE

## 2024-02-02 VITALS
DIASTOLIC BLOOD PRESSURE: 74 MMHG | OXYGEN SATURATION: 96 % | SYSTOLIC BLOOD PRESSURE: 112 MMHG | RESPIRATION RATE: 16 BRPM | BODY MASS INDEX: 26.98 KG/M2 | WEIGHT: 149.91 LBS | TEMPERATURE: 96.6 F | HEART RATE: 87 BPM

## 2024-02-02 DIAGNOSIS — J18.9 PNEUMONIA, UNSPECIFIED ORGANISM: ICD-10-CM

## 2024-02-02 DIAGNOSIS — R91.8 OTHER NONSPECIFIC ABNORMAL FINDING OF LUNG FIELD: ICD-10-CM

## 2024-02-02 DIAGNOSIS — R63.4 ABNORMAL WEIGHT LOSS: ICD-10-CM

## 2024-02-02 LAB
BASOPHILS # BLD AUTO: 0.03 K/UL — SIGNIFICANT CHANGE UP (ref 0–0.2)
BASOPHILS NFR BLD AUTO: 0.3 % — SIGNIFICANT CHANGE UP (ref 0–2)
EOSINOPHIL # BLD AUTO: 0.07 K/UL — SIGNIFICANT CHANGE UP (ref 0–0.5)
EOSINOPHIL NFR BLD AUTO: 0.6 % — SIGNIFICANT CHANGE UP (ref 0–6)
HCT VFR BLD CALC: 38.9 % — SIGNIFICANT CHANGE UP (ref 34.5–45)
HGB BLD-MCNC: 13.1 G/DL — SIGNIFICANT CHANGE UP (ref 11.5–15.5)
IMM GRANULOCYTES NFR BLD AUTO: 1.9 % — HIGH (ref 0–0.9)
LYMPHOCYTES # BLD AUTO: 1.04 K/UL — SIGNIFICANT CHANGE UP (ref 1–3.3)
LYMPHOCYTES # BLD AUTO: 9.4 % — LOW (ref 13–44)
MCHC RBC-ENTMCNC: 31.7 PG — SIGNIFICANT CHANGE UP (ref 27–34)
MCHC RBC-ENTMCNC: 33.7 G/DL — SIGNIFICANT CHANGE UP (ref 32–36)
MCV RBC AUTO: 94.2 FL — SIGNIFICANT CHANGE UP (ref 80–100)
MONOCYTES # BLD AUTO: 0.53 K/UL — SIGNIFICANT CHANGE UP (ref 0–0.9)
MONOCYTES NFR BLD AUTO: 4.8 % — SIGNIFICANT CHANGE UP (ref 2–14)
NEUTROPHILS # BLD AUTO: 9.21 K/UL — HIGH (ref 1.8–7.4)
NEUTROPHILS NFR BLD AUTO: 83 % — HIGH (ref 43–77)
NRBC # BLD: 0 /100 WBCS — SIGNIFICANT CHANGE UP (ref 0–0)
PLATELET # BLD AUTO: 319 K/UL — SIGNIFICANT CHANGE UP (ref 150–400)
RBC # BLD: 4.13 M/UL — SIGNIFICANT CHANGE UP (ref 3.8–5.2)
RBC # FLD: 13.7 % — SIGNIFICANT CHANGE UP (ref 10.3–14.5)
WBC # BLD: 11.09 K/UL — HIGH (ref 3.8–10.5)
WBC # FLD AUTO: 11.09 K/UL — HIGH (ref 3.8–10.5)

## 2024-02-02 PROCEDURE — 99215 OFFICE O/P EST HI 40 MIN: CPT

## 2024-02-02 PROCEDURE — G2211 COMPLEX E/M VISIT ADD ON: CPT

## 2024-02-02 RX ORDER — LEVOFLOXACIN 750 MG/1
750 TABLET, FILM COATED ORAL DAILY
Qty: 5 | Refills: 0 | Status: ACTIVE | COMMUNITY
Start: 2024-02-02 | End: 1900-01-01

## 2024-02-04 LAB
ALBUMIN SERPL ELPH-MCNC: 3.8 G/DL
ALP BLD-CCNC: 74 U/L
ALT SERPL-CCNC: 11 U/L
ANION GAP SERPL CALC-SCNC: 15 MMOL/L
AST SERPL-CCNC: 15 U/L
BILIRUB SERPL-MCNC: 0.4 MG/DL
BUN SERPL-MCNC: 34 MG/DL
CALCIUM SERPL-MCNC: 9.4 MG/DL
CHLORIDE SERPL-SCNC: 94 MMOL/L
CO2 SERPL-SCNC: 25 MMOL/L
CREAT SERPL-MCNC: 1.14 MG/DL
EGFR: 48 ML/MIN/1.73M2
FERRITIN SERPL-MCNC: 199 NG/ML
GLUCOSE SERPL-MCNC: 376 MG/DL
IRON SATN MFR SERPL: 17 %
IRON SERPL-MCNC: 45 UG/DL
POTASSIUM SERPL-SCNC: 5 MMOL/L
PROT SERPL-MCNC: 5.9 G/DL
SODIUM SERPL-SCNC: 134 MMOL/L
TIBC SERPL-MCNC: 268 UG/DL
TSH SERPL-ACNC: 0.82 UIU/ML
UIBC SERPL-MCNC: 222 UG/DL

## 2024-02-08 ENCOUNTER — NON-APPOINTMENT (OUTPATIENT)
Age: 84
End: 2024-02-08

## 2024-02-14 ENCOUNTER — APPOINTMENT (OUTPATIENT)
Dept: DERMATOLOGY | Facility: CLINIC | Age: 84
End: 2024-02-14
Payer: MEDICARE

## 2024-02-14 ENCOUNTER — LABORATORY RESULT (OUTPATIENT)
Age: 84
End: 2024-02-14

## 2024-02-14 DIAGNOSIS — D48.5 NEOPLASM OF UNCERTAIN BEHAVIOR OF SKIN: ICD-10-CM

## 2024-02-14 DIAGNOSIS — L29.9 PRURITUS, UNSPECIFIED: ICD-10-CM

## 2024-02-14 PROCEDURE — 11102 TANGNTL BX SKIN SINGLE LES: CPT

## 2024-02-14 PROCEDURE — 99204 OFFICE O/P NEW MOD 45 MIN: CPT | Mod: 25

## 2024-02-14 RX ORDER — TRIAMCINOLONE ACETONIDE 1 MG/G
0.1 CREAM TOPICAL TWICE DAILY
Qty: 1 | Refills: 1 | Status: ACTIVE | COMMUNITY
Start: 2024-02-14 | End: 1900-01-01

## 2024-02-14 NOTE — PHYSICAL EXAM
[Alert] : alert [Oriented x 3] : ~L oriented x 3 [Well Nourished] : well nourished [Conjunctiva Non-injected] : conjunctiva non-injected [No Visual Lymphadenopathy] : no visual  lymphadenopathy [No Clubbing] : no clubbing [No Edema] : no edema [No Bromhidrosis] : no bromhidrosis [No Chromhidrosis] : no chromhidrosis [FreeTextEntry3] : Eroded hyperkeratotic plaque on the central chest Ecchymoses on the arms. No rash

## 2024-02-14 NOTE — HISTORY OF PRESENT ILLNESS
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 Not healing Also complaining of itch on the arms that is worse at night. No prior rash

## 2024-02-22 ENCOUNTER — APPOINTMENT (OUTPATIENT)
Dept: GERIATRICS | Facility: CLINIC | Age: 84
End: 2024-02-22
Payer: MEDICARE

## 2024-02-22 ENCOUNTER — APPOINTMENT (OUTPATIENT)
Dept: INFUSION THERAPY | Facility: HOSPITAL | Age: 84
End: 2024-02-22

## 2024-02-22 ENCOUNTER — APPOINTMENT (OUTPATIENT)
Dept: HEMATOLOGY ONCOLOGY | Facility: CLINIC | Age: 84
End: 2024-02-22

## 2024-02-22 DIAGNOSIS — F43.21 ADJUSTMENT DISORDER WITH DEPRESSED MOOD: ICD-10-CM

## 2024-02-22 DIAGNOSIS — K59.00 CONSTIPATION, UNSPECIFIED: ICD-10-CM

## 2024-02-22 PROCEDURE — 99215 OFFICE O/P EST HI 40 MIN: CPT

## 2024-02-22 RX ORDER — TRAMADOL HYDROCHLORIDE 50 MG/1
50 TABLET, COATED ORAL
Qty: 84 | Refills: 0 | Status: ACTIVE | COMMUNITY
Start: 2023-11-09 | End: 1900-01-01

## 2024-02-22 NOTE — ASSESSMENT
[______] : HCP: [unfilled] [FreeTextEntry1] : 82yoF with:  # Stage IV Lung Adenocarcinoma - Keytruda (on hold since 11/2023); s/p gamma knife surgery. Med Onc follow up.  # Chronic lower back pain/hip pain - Patient has a multitude of lumbar spine issues as indicated on MRI LS Spine (7/2022).  - Increase Tramadol to 75mg PRN for severe pain, as this has been beneficial for her. Discussed that if patient no longer wishes to pursue interventional procedures, then palliative care will take over her pain regimen to streamline patients visits in the future.  - C/w Tylenol 500-1000mg PRN. - C/w gabapentin to 100mg QAM and C/w Gabapentin to 300mg QHS. Will continue to increase gabapentin to therapeutic dosing as tolerated. - Refer to spine center for possible rehab and alternative approaches to her back pain.  - C/w medical cannabis.   # Decreased appetite - Discussed how medical cannabis can be beneficial. Recommend dosing pre-meals. Recommend small, frequent meals focused on high-calorie, high-protein, nutrient dense healthy foods.  # Constipation - Recommend use of Miralax daily.  # Adjustment d/o - Empathetic listening and validation of patient's emotions provided.  # Encounter for palliative care- Explained the role of palliative care in enhancing quality of life in the setting of serious illness. Health Care Proxy (HCP) form on file.  Follow up in 3 weeks, call with questions or issues.

## 2024-02-22 NOTE — DATA REVIEWED
[FreeTextEntry1] : PETCT  KEVIN TORRES ONC FDGSUBS KX  (01/25/2024):   COMPARISON:  FDG-PET/CT dated  10/5/2023  OTHER STUDIES USED FOR CORRELATION: CT head 12/17/2023. CT abdomen 12/9/2023.  FINDINGS:  HEAD/NECK: Incompletely imaged right temporal craniotomy and photopenic hypodense area in right temporal lobe, unchanged, compatible with postsurgical encephalomalacia and gliosis, unchanged, and better evaluated on MRI. Physiologic FDG activity in remainder of visualized head and neck.  CHEST: No enlarged or FDG-avid mediastinal or hilar lymph node.  LUNGS: Compared to PET/CT 10/5/2023, there has been interval development of FDG avid groundglass opacities in the left upper lobe. For example, an anterior left upper lobe opacity measures 1.1 x 0.8 cm, SUV 2.6 (image 68). An additional linear left upper lobe opacity measures 1.4 x 0.5 cm, SUV 3 (image 70). Additional groundglass opacities are evident in the lungs bilaterally, increased since prior exam, without FDG uptake.  Minimally FDG-avid linear left upper lobe opacity abutting the mediastinum is not significantly changed (image 60). Similar minimally FDG avid linear opacity at the level of the AP window (image 75).  PLEURA/PERICARDIUM: No abnormal FDG activity. No effusion.  HEPATOBILIARY/PANCREAS: Physiologic FDG activity. Liver SUV mean is 3.1; previously 3.6.  SPLEEN: Physiologic FDG activity. Normal in size.  ADRENAL GLANDS: No abnormal FDG activity. No nodule.  KIDNEYS/URINARY BLADDER: Physiologic excreted FDG activity.  REPRODUCTIVE ORGANS: No abnormal FDG activity.  ABDOMINOPELVIC NODES: No enlarged or FDG-avid lymph node.  BOWEL/PERITONEUM/MESENTERY: Nonspecific diffuse distal small bowel and pancolonic hypermetabolism, not significantly changed, and without corresponding abnormalities on CT, likely is related to metformin.  VESSELS: Coronary artery calcifications and/or stent. Atherosclerotic changes in the aorta and branches. No aneurysm.  BONES/SOFT TISSUES: New FDG avid cutaneous nodule on the anterior chest just right of midline measuring 0.9 x 0.4 cm, SUV 3 (image 70).  Nonspecific, mild FDG activity adjacent to right shoulder prosthesis, and FDG-avid degenerative change, left glenohumeral joint and lumbar spine, unchanged.  Nonspecific, mildly FDG-avid skin thickening and subcutaneous stranding, bilateral mid/lower anterior abdominal wall, similar to prior.  IMPRESSION:  1. Compared to PET/CT 10/5/2023, there has been interval development of FDG avid groundglass opacities in the left upper lobe, one measuring 1.1 x 0.8 cm, SUV 2.6, and one linear opacity measuring 1.4 x 0.5 cm, SUV 3. Recurrent disease is a consideration, as well as inflammatory/infectious changes. Note that there are additional groundglass opacities in the lungs which have increased since prior exam, which are non-FDG avid. Consider clinical correlation for signs and symptoms of infection and interval short-term follow-up PET/CT for further evaluation.  2. Compared with PET/CT 10/5/2023, there has been interval development of an FDG avid skin nodule in the anterior chest just right of midline. Recommend clinical examination for signs of neoplasm or infection.

## 2024-02-23 RX ORDER — LEVETIRACETAM 750 MG/1
750 TABLET, FILM COATED ORAL TWICE DAILY
Qty: 180 | Refills: 2 | Status: DISCONTINUED | COMMUNITY
Start: 2023-01-24 | End: 2024-02-23

## 2024-02-25 ENCOUNTER — NON-APPOINTMENT (OUTPATIENT)
Age: 84
End: 2024-02-25

## 2024-03-06 ENCOUNTER — APPOINTMENT (OUTPATIENT)
Dept: NEUROLOGY | Facility: CLINIC | Age: 84
End: 2024-03-06

## 2024-03-07 ENCOUNTER — OUTPATIENT (OUTPATIENT)
Dept: OUTPATIENT SERVICES | Facility: HOSPITAL | Age: 84
LOS: 1 days | End: 2024-03-07
Payer: MEDICARE

## 2024-03-07 ENCOUNTER — APPOINTMENT (OUTPATIENT)
Dept: MRI IMAGING | Facility: IMAGING CENTER | Age: 84
End: 2024-03-07
Payer: MEDICARE

## 2024-03-07 DIAGNOSIS — C79.31 SECONDARY MALIGNANT NEOPLASM OF BRAIN: ICD-10-CM

## 2024-03-07 DIAGNOSIS — I25.10 ATHEROSCLEROTIC HEART DISEASE OF NATIVE CORONARY ARTERY WITHOUT ANGINA PECTORIS: Chronic | ICD-10-CM

## 2024-03-07 DIAGNOSIS — I73.9 PERIPHERAL VASCULAR DISEASE, UNSPECIFIED: Chronic | ICD-10-CM

## 2024-03-07 PROCEDURE — 70553 MRI BRAIN STEM W/O & W/DYE: CPT | Mod: 26,MH

## 2024-03-07 PROCEDURE — 70553 MRI BRAIN STEM W/O & W/DYE: CPT

## 2024-03-07 PROCEDURE — A9585: CPT

## 2024-03-11 ENCOUNTER — APPOINTMENT (OUTPATIENT)
Dept: NEUROLOGY | Facility: CLINIC | Age: 84
End: 2024-03-11
Payer: MEDICARE

## 2024-03-11 ENCOUNTER — APPOINTMENT (OUTPATIENT)
Dept: NEUROLOGY | Facility: CLINIC | Age: 84
End: 2024-03-11

## 2024-03-11 DIAGNOSIS — G40.802 OTHER EPILEPSY, NOT INTRACTABLE, W/OUT STATUS EPILEPTICUS: ICD-10-CM

## 2024-03-11 DIAGNOSIS — R47.01 APHASIA: ICD-10-CM

## 2024-03-11 PROCEDURE — 99214 OFFICE O/P EST MOD 30 MIN: CPT

## 2024-03-11 RX ORDER — LEVETIRACETAM 500 MG/1
500 TABLET, FILM COATED, EXTENDED RELEASE ORAL
Qty: 120 | Refills: 5 | Status: ACTIVE | COMMUNITY
Start: 2023-08-24 | End: 1900-01-01

## 2024-03-14 PROBLEM — R47.01 APHASIA OF UNKNOWN ORIGIN: Status: ACTIVE | Noted: 2023-08-24

## 2024-03-14 PROBLEM — G40.802: Status: ACTIVE | Noted: 2023-02-02

## 2024-03-14 NOTE — HISTORY OF PRESENT ILLNESS
[FreeTextEntry1] : NEURO-ONCOLOGY  This 83 year old right handed woman is seen in follow up for evaluation and management of episodic aphasia  She has NSCLC.  She presented with headaches, and was found to have a dominant right temporal and two small left frontal brain metastases.  The dominant lesion was resected by Dr. Loomis in 7/22, followed by SRS to cavity and two small lesions in left frontal lobe in 9/22 with good response.  She started pembrolizumab under Dr. Ramon's care. EEG at time of diagnosis with RIGHT temporal sharps.  She stopped pembro for elevated LFTs in 11/23.   She has had multiple episodes of transient expressive aphasia lasting 5-10 minutes, in 9/22, 10/22 and 1/23 and 3/23. One occurred off eliquis, but the others occurred on eliquis and keppra. She's been on keppra since surgery. She had no language issues following surgery, but does note that her handwriting (right hand) has become worse.  She's had intermittent headaches. US carotid dopplers reveal 50-69% bilateral internal carotid artery stenosis. Last event was in 7/23 and keppra changed to XR.   INTERVAL HISTORY:  No spells or seizures.  MRI is stable on my read.  No new symptoms except for severe chronic back pain.  The pain radiates to left leg and buttock.  She is able to walk.    PMH:  Cardiac stents in 1990s.  MI.  PE in 8/22.  Afib listed in chart, but cardiologist denies she had it,  PSH:  CEA on RIGHT.   SHX:  Office work.  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  no etoh. here with son and aide.

## 2024-03-14 NOTE — DATA REVIEWED
[de-identified] : I reviewed pre and post oeprative scans from 7/22 showing resection of right temporal tumor, and SRS planning scan 9/22 showing cystic left frontal parasag lesion and small left frontal lateral lesion, both improved on 12/22 MRI without edema, stable without new lesion in 2/23, 6/23, 7/23, 11/23 3/24 MRI. [de-identified] : US bilateral carotid doppler 02/23 reveals bilateral 50-69% internal carotid artery stenosis.

## 2024-03-14 NOTE — REASON FOR VISIT
[Home] : at home, [unfilled] , at the time of the visit. [Formal Caregiver] : formal caregiver [Medical Office: ([facility])___] : at the medical office located in  [Self] : self [Patient] : the patient

## 2024-03-14 NOTE — DISCUSSION/SUMMARY
[FreeTextEntry1] : Episodic aphasia.  She has vascular risk (albeit on eliquis,however also history of carotid disease and smoking), and interictal spikes on EEG at time of surgery but on RIGHT side.  Left sided lesions seem unlikely to be cause of symptoms given size and edema. Semiology seems to be seizures, perhaps she is right dominant.  It is also possible that she is having TIAs as she did not start the asa.    Either way scan stable.  APHASIA: Continue eliquis, continue keppra  POSSIBLE EPILEPSY: keppra xr 1000 bid  BRAIN METS: Next MRI in 4mo coordinated with in person f/u  PAIN: refilled GBP  RTC 4mo

## 2024-03-14 NOTE — PHYSICAL EXAM
[FreeTextEntry1] : Limited by TEB  Well appearing woman Alert and oriented, able to provide history CN intact, EOMI, face symmetric, tongue midline Moves all four extremities equally walks on own with walker

## 2024-03-15 ENCOUNTER — APPOINTMENT (OUTPATIENT)
Dept: HEMATOLOGY ONCOLOGY | Facility: CLINIC | Age: 84
End: 2024-03-15
Payer: MEDICARE

## 2024-03-15 PROCEDURE — 99214 OFFICE O/P EST MOD 30 MIN: CPT

## 2024-03-15 PROCEDURE — G2211 COMPLEX E/M VISIT ADD ON: CPT

## 2024-03-18 ENCOUNTER — NON-APPOINTMENT (OUTPATIENT)
Age: 84
End: 2024-03-18

## 2024-03-19 RX ORDER — DOCUSATE SODIUM 100 MG/1
100 CAPSULE ORAL 3 TIMES DAILY
Qty: 90 | Refills: 0 | Status: ACTIVE | COMMUNITY
Start: 2024-03-19 | End: 1900-01-01

## 2024-03-19 NOTE — RESULTS/DATA
[FreeTextEntry1] : -CT Brain 7/18/22:  Cystic versus a necrotic masses measure approximately 4.8 x 3.2 x 3.2 cm in the right temporal lobe and 1.4 x 1.3 x 1.6 cm in the medial left frontal lobe.  Moderate to large amount of vasogenic edema in the right frontal, parietal and temporal lobes. Trace edema in the left frontal lobe.  Regional mass effect in the right cerebral hemisphere with partial effacement of the ventricular system and 5 mm subfalcine herniation of the right frontal lobe underneath the cerebral falx.  The findings may represent intracranial metastatic disease versus glioblastoma. Contrast-enhanced brain MRI is recommended for further evaluation.  -CT C/A/P 7/18/22:  Left upper lobe lung mass with partial atelectasis of the left upper lobe, concerning for primary lung neoplasm. Few scattered sub-4 mm pulmonary nodules, indeterminate.  Indeterminant left adrenal nodule for which contrast enhanced MRI is recommended for further evaluation.  -MRI Brain 7/20/22:  Right temporal lobe peripherally enhancing, centrally necrotic 3.1 x 2.6 cm mass demonstrating mild diffusion restriction. There is surrounding vasogenic edema. 8 mm leftward midline shift. Right uncal herniation. Additional left parafalcine 1.3 x 0.9 cm rim-enhancing cystic lesion with mild surrounding vasogenic edema. Tiny 0.3 x 0.3 cm rim-enhancing lesion within the left frontal cortex. Findings are concerning for metastatic disease in the context of patient's known primary lung cancer. The left lateral ventricle is mildly dilated.  -LE Dopplers 7/24/22:  No evidence of deep venous thrombosis in either lower extremity. -LE Dopplers 7/28/22:  No evidence of deep venous thrombosis in either lower extremity.  -CT Chest Angio 8/3/22:  Pulmonary embolism in the lateral segmental branch of the right middle lobe. No evidence of right heart strain.  Interval decrease in left upper lobe mass.  Unchanged obstruction of the posterior bronchus, and stable fibroglandular lymph node.  -Abdominal U/S 8/4/22:   Mildly distended gallbladder. No evidence of acute cholecystitis.  Possible mild hydronephrosis.  -LE Dopplers 8/7/22:  No evidence of deep venous thrombosis in either lower extremity.  -LE Dopplers 9/1/22:  No evidence of deep venous thrombosis in either lower extremity.  -PET/CT 9/9/22:  Abnormal FDG-PET/CT scan. 1. FDG-avid left upper lobe lung mass abutting the mediastinum at level of AP window, with associated atelectasis along the mediastinum, unchanged as compared to CT dated 8/3/2022, corresponds to patient's known adenocarcinoma. 2. Right temporal craniotomy with photopenic low attenuation within right temporal lobe, compatible with vasogenic edema, gliosis, and/or postsurgical changes, better evaluated on contrast-enhanced CT or MRI. 3. Findings suggestive of left vocal cord paralysis, likely secondary to left upper lobe lung mass. Please correlate clinically and with direct visualization, as indicated. 4. Nonspecific hypermetabolism in small bowel and colon likely is related to metformin. 5. No scan evidence of metastatic disease.  -PET/CT 12/8/22:  1. FDG-avid left upper lobe lung nodule is decreased in size and metabolism, compatible with a partial response to interval therapy. Interval decrease in adjacent left upper lobe paramediastinal opacity, likely atelectasis, which is unchanged in metabolism. 2. Right temporal craniotomy with photopenic low attenuation within right temporal lobe, compatible with vasogenic edema, gliosis, and/or postsurgical changes, better evaluated on contrast-enhanced CT or MRI, not significantly changed. 3. Findings suggestive of left vocal cord paralysis, likely secondary to left upper lobe lung mass, unchanged. 4. Nonspecific hypermetabolism in small bowel and colon, unchanged, likely related to metformin. 5. No scan evidence of metastatic disease.  -MRI 12/8/22:  Interval decrease in size and peripheral enhancement of the medial left frontal lesion. Smaller ring-enhancing lesion more laterally along the left frontal cortex no longer visualized.  No new lesions seen.  Postsurgical cavity right temporal lobe with mild adjacent enhancement. Continued follow-up recommended.  -MRI Brain 2/23/23:  Stable medial left frontal lobe nonenhancing cystic lesion. Stable linear enhancement medial to the right temporal lobe surgical cavity. No new areas abnormal enhancement. Continued follow-up is recommended  -PET/CT 3/2/23:  Compared to FDG PET/CT dated 12/8/2022: 1. FDG-avid left upper lobe lung nodule is not significantly changed in size or metabolism, compatible with stable disease. Interval decrease in size and metabolism of adjacent left upper lobe opacity abutting the mediastinum. 2. Nonspecific FDG-avid skin thickening, bilateral mid/lower anterior abdominal wall with new associated subcutaneous stranding, and increased in metabolism, likely secondary to inflammation. Please correlate clinically. 3. Right temporal craniotomy with photopenic low attenuation within right temporal lobe, compatible with vasogenic edema, gliosis, and/or postsurgical changes, better evaluated on contrast-enhanced CT or MRI, not significantly changed. 4. Nonspecific pancolonic hypermetabolism, not significantly changed, likely is related to metformin. 5. No scan evidence of metastatic disease.  -MRI Brain 6/15/23:  Stable right temporal postoperative changes. Stable subcentimeter focus of enhancement along the medial aspect of the cavity, likely posttreatment changes. No new or worsening enhancement identified.  -PET/CT 6/22/23:  Compared to FDG PET/CT dated 3/2/2023: 1. Mildly FDG-avid left upper lobe lung nodule is not significantly changed in size or metabolism, compatible with stable disease. A minimally FDG-avid adjacent left upper lobe opacity abutting the mediastinum is decreased in size and unchanged and avidity. 2. Right temporal craniotomy with photopenic low attenuation within right temporal lobe, compatible with posttreatment changes, unchanged. 3. Nonspecific diffuse distal small bowel hypermetabolism, increased as compared to prior study, and pancolonic hypermetabolism, decreased as compared to prior study, without corresponding abnormalities on CT, likely are related to metformin. 4. Nonspecific mildly FDG-avid skin thickening and subcutaneous stranding, bilateral mid/lower anterior abdominal wall, decreased as compared to prior study. Please correlate clinically. 5. Remainder of study demonstrates no evidence of FDG-avid disease.  -MRI Brain 7/16/23: No significant interval change from 6/15/2023.  Similar-appearing postsurgical encephalomalacia and gliosis in the right mid temporal lobe. Similar 5 mm focus of enhancement along the mesial aspect of the surgical cavity, likely postsurgical in nature. No new abnormal parenchymal or leptomeningeal enhancement. Similar nonspecific T2 and FLAIR hyperintense signal surrounding the surgical cavity, likely representing gliosis.  -CT PERFUSION 7/16/23: Perfusion abnormality localized to hypodense region in the temporal lobe, related to postoperative changes rather than true perfusion defect. No evidence of core infarct or ischemic penumbra otherwise.  -CT ANGIOGRAPHY NECK 7/16/23: Patent vasculature without occlusion. Changes of prior right carotid endarterectomy. Marked narrowing and luminal irregularity of the proximal right internal carotid artery just beyond the origin with limited assessment of stenosis by NASCET criteria given the overall diminutive caliber to the cervical right internal carotid artery. Moderate stenosis origin left internal carotid artery by NASCET criteria. No critical stenosis or dissection.  -CT ANGIOGRAPHY BRAIN 7/16/23: No vessel occlusion, flow-limiting stenosis or aneurysm. NASCET CAROTID STENOSIS CRITERIA (distal normal appearing ICA as denominator for measurement): 0%-none, 1-49%-mild, 50-70%-moderate, 70-89%-severe, 90-99%-critical.  -MRI Brain 7/17/23:  No significant interval change from 6/15/2023.  Similar-appearing postsurgical encephalomalacia and gliosis in the right mid temporal lobe.  Similar 5 mm focus of enhancement along the mesial aspect of the surgical cavity, likely postsurgical in nature.  No new abnormal parenchymal or leptomeningeal enhancement.  Similar nonspecific T2 and FLAIR hyperintense signal surrounding the surgical cavity, likely representing gliosis.  -PET/CT 10/5/23:  Compared to FDG PET/CT dated 6/22/2023: 1. Small difficult to delineate FDG-avid focus in left upper lobe paramediastinal region is further decreased in size and metabolism. Small focus of residual disease is not excluded. Minimally FDG-avid linear left upper lobe opacity abutting the mediastinum, not significantly changed, may represent a combination of atelectasis and treated disease. 2. Nonspecific diffuse distal small bowel and pancolonic hypermetabolism, not significantly changed, likely is related to metformin. 3. Nonspecific mildly FDG-avid skin thickening and subcutaneous stranding, bilateral mid/lower anterior abdominal wall, decreased in thickness and unchanged in metabolism. Please correlate clinically. 4. Remainder of study demonstrates no evidence of FDG-avid disease. No new lesion.  -MRI Brain 11/2/23: stable exam compared to 7/17/2023  -US Abdomen 11/16/23:  Left renal cyst. Otherwise, unremarkable abdominal sonogram.  -CT Abdomen/Pelvis 12/9/23:  MIld fat stranding surrounding the 1st and 2nd portions of the duodenum of uncertain etiology, however raising the possibility of duodenal inflammation.  Periportal lymph node, new since July 18, 2022.  -MR Abdomen 12/11/23:   * Mild periportal edema. Otherwise unremarkable appearance of the liver. * No biliary duct dilation. * Mild nonspecific edematous gallbladder wall thickening, potentially reactive to the liver. Consider correlation with right upper quadrant ultrasound as warranted. * Nonspecific periportal lymphadenopathy.  -MBS 12/15/23:  There was no evidence of penetration or aspiration with the tested consistencies.  -CT Head 12/17/23:  Stable exam. No acute intracranial hemorrhage, mass effect, or midline shift.  Stable postsurgical changes in the right temporal bone, with subadjacent encephalomalacia and gliosis.  -PET/CT 1/25/24:   1. Compared to PET/CT 10/5/2023, there has been interval development of FDG avid groundglass opacities in the left upper lobe, one measuring 1.1 x 0.8 cm, SUV 2.6, and one linear opacity measuring 1.4 x 0.5 cm, SUV 3. Recurrent disease is a consideration, as well as inflammatory/infectious changes. Note that there are additional groundglass opacities in the lungs which have increased since prior exam, which are non-FDG avid. Consider clinical correlation for signs and symptoms of infection and interval short-term follow-up PET/CT for further evaluation. 2. Compared with PET/CT 10/5/2023, there has been interval development of an FDG avid skin nodule in the anterior chest just right of midline. Recommend clinical examination for signs of neoplasm or infection.  Images Reviewed/Interpreted:  -MRI Brain 3/7/24:  Redemonstration of right temporal surgical cavity with similar nonspecific surrounding T2 and FLAIR hyperintense signal which likely represents gliosis/postsurgical changes. Similar ex vacuo dilatation of the adjacent right temporal horn.  Slightly increased size of curvilinear focus of enhancement along the superomedial aspect of the right temporal surgical cavity, currently 5 x 2 mm, previously 4 x 2 mm.  No new abnormal parenchymal enhancement. No abnormal leptomeningeal enhancement.  MR perfusion demonstrates decreased relative cerebral blood flow and relative cerebral blood volume in the region of the right temporal surgical cavity and surrounding T2 and FLAIR hyperintense signal, suggesting the presence of posttreatment/postsurgical changes. Tiny enhancing focus along the superomedial aspect of the surgical cavity is poorly evaluated due to its small size.

## 2024-03-19 NOTE — ED ADULT NURSE NOTE - HOW OFTEN DO YOU HAVE A DRINK CONTAINING ALCOHOL?
Transition of Care Plan:    RUR: 20%   Prior Level of Functioning: Independent w/ Adls and Iadls   The pt reported that she lives with her daughter in a 2 level home. The pt reported that her daughter typically works until 6pm.   The pt reported that hse has hx of HH and rehab but unable to recall the name of the agency.   Disposition: IPR vs. SNF   If SNF or IPR: Date FOC offered: 3/19   Date FOC received: 3/19   Pending facility: [facility]   The pt reported that she was interested in this cm sending a referral to the facilities listed above.   Date authorization started with reference number: N/A   Date authorization received and expires: N/A   Follow up appointments: PCP   DME needed: None   Transportation at discharge: BLS   IM/IMM Medicare/[facility] letter given: Received   Caregiver Contact: Magaly Barneselle (Child)  [phone]  Discharge Caregiver contacted prior to discharge? N/A   Care Conference needed? No   Barriers to discharge: Medical, Placement,      03/19/24 1232   Service Assessment   Patient Orientation Alert and Oriented   Cognition Alert   History Provided By Patient   Primary Caregiver Self   Support Systems Children   Patient's Healthcare Decision Maker is: Named in Scanned ACP Document   PCP Verified by CM Yes   Last Visit to PCP Within last 3 months   Prior Functional Level Independent in ADLs/IADLs   Current Functional Level Independent in ADLs/IADLs   Can patient return to prior living arrangement Yes  (MollyBreonna (Child)  [phone])   Ability to make needs known: Good   Family able to assist with home care needs: Yes   Would you like for me to discuss the discharge plan with any other family members/significant others, and if so, who? Yes   Financial Resources Medicare   Social/Functional History   Lives With Daughter   Type of Home House   Home Layout Two level   Home Access Stairs to enter with rails   Entrance Stairs - Number of Steps 3   Bathroom Shower/Tub Tub/Shower  Never

## 2024-03-19 NOTE — REASON FOR VISIT
[Follow-Up Visit] : a follow-up [Other: _____] : [unfilled] [Formal Caregiver] : formal caregiver [FreeTextEntry2] : Lung Cancer

## 2024-03-19 NOTE — ASSESSMENT
[FreeTextEntry1] : Metastatic NSCLC with adenocarcinoma histology; tumor tested PDL1 Negative and lacks actionable mutations but tested TMB-High. Treated with GK-SRS for brain metastases in Sept 2022. Started first line single agent Pembrolizumab in Oct 2022; achieved NM. 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  She is s/p hospitalization Dec 2023 and started on steroids with normalization of liver enzymes.   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  She completed Prednisone taper.  Follow-up with hepatology.   - Lung opacities noted on January 2024 PET CT scan are possibly infectious/inflammatory.  She completed course of Abx.  Can monitor these findings on subsequent scan. -Back pain: Pt with h/o spinal stenosis and longstanding chronic back pain that predates her diagnosis. Follows with outside pain management Dr. Ryder Salazar ([phone]). Follows with [facility]. Takes gabapentin, Tramadol and medical cannabis.  Will see if PMR can reach out again; she wanted her son contacted - Brain Metastases: F/U with Radiation oncology s/p GK-SRS to brain metastases. Follows with Neuro-Onc as well. On [place].  MRI Brain from March 2023 with sustained intracranial response.  A 4 month brain MRI is planned (July).    - PE: Continue Eliquis anticoagulation indefinitely given PE and Afib. - Continue follow-up and management with her PCP/cardiologist for her other ongoing medical problems and comorbidities. -Iron-deficiency anemia:  s/p PO iron repletion.  Monitor hemoglobin and need for further iron repletion.   - Saw Derm for chest skin lesion status post biopsy on 2/14/2024 revealing invasive squamous cell carcinoma.  Patient is supposed to see surgeon for Mohs procedure. - Restaging CT Chest in late April (3 months) to monitor the current findings.  Her current scan appears to show a sustained response with regards to her malignancy; the other nonspecific findings can be monitored. F/U to review results.

## 2024-03-19 NOTE — PHYSICAL EXAM
[Ambulatory and capable of all self care but unable to carry out any work activities] : Status 2- Ambulatory and capable of all self care but unable to carry out any work activities. Up and about more than 50% of waking hours [Normal] : affect appropriate [de-identified] : S1 S2 [de-identified] : No spine/CVA tenderness [de-identified] : Nodular growth on aterior right chest wall [de-identified] : NAD [de-identified] : No icterus  [de-identified] : PAU [de-identified] : No LAD  [de-identified] : Not SOB [de-identified] : No edema

## 2024-03-19 NOTE — HISTORY OF PRESENT ILLNESS
[Disease: _____________________] : Disease: [unfilled] [AJCC Stage: ____] : AJCC Stage: [unfilled] [Home] : at home, [unfilled] , at the time of the visit. [Medical Office: ([facility])___] : at the medical office located in  [Formal Caregiver] : formal caregiver [Verbal consent obtained from patient] : the patient, [unfilled] [de-identified] : 82F former smoker with multiple comorbidities was undergoing orthopedic evaluation as an outpatient due to ongoing chronic back pain and gait imbalance.  She ultimately presented to [facility] ED on 7/19/2022.  Brain imaging demonstrated evidence of metastatic disease including a dominant right temporal lobe 3.1 cm enhancing mass with vasogenic edema.  CT scan of her body demonstrated a ANTHONY tumor with subcentimeter pulmonary nodules and a nonspecific adrenal nodule.  Bronchoscopy with biopsy performed on 7/20/2022 demonstrated adenocarcinoma consistent with lung primary; tumor tested PD-L1 negative and was negative for actionable mutations. however, tested TMB-High.  She underwent craniotomy resection of the dominant brain mass on 7/27/2022 with pathology revealing the same findings.  Hospital course was complicated by NSTEMI, Afib with RVR and RML PE.  She was put on medications for seizure prophylaxis.  She was subsequently discharged to acute rehab on 8/10-8/26/2022.  Treated with GK-SRS to brain metastases in Sept 2022. Started 1st line single agent Pembro in Oct 2022; achieved [place].  Patient was treated through early Nov 2023 when the immunotherapy was held after development of transaminitis and hyperbilirubinemia concering for ICI-induced hepatitis.  Patient is status post hospital admission 12/9 - 12/18/2023 after presenting with elevated liver enzymes, originally noted by endocrinologist, which were worsening.  Abdominal ultrasound revealed a left renal cyst and otherwise was unremarkable.  Hepatology was consulted.  Acute hepatitis panel was negative.  CT abdomen and MRI abdomen were uninformative with regards to etiology of transaminitis.  Patient was started on steroids with prednisone 40 mg daily with improvement in the LFTs.  Liver biopsy was avoided.  She was treated for UTI during her hospital stay.  DM was medically managed given the addition of the steroids.  Monitored off systemic therapy after last dose of Pembro in Nov 2023.   [FreeTextEntry4] : Elijah Moeller [de-identified] : - Left mainstem bronchus biopsy 7/20/2022: Poorly differentiated adenocarcinoma of lung origin.\par  PD-L1 negative/0%.\par  Foundation One:  No actionable mutations (TP53+, among others); TMB-High.  \par  -Right brain tumor resection 7/27/2022: Metastatic non-small cell carcinoma, favor adenocarcinoma, consistent with lung origin.\par  PD-L1 negative. [de-identified] : Patient started first-line single agent Pembrolizumab in Oct 2022; achieved [place].  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    Today's visit is via telehealth; her aide is present.   She completed prednisone taper for autoimmune hepatitis. Her main complaint is that of ongoing back pain.   PMR referral was placed with her last visit; she has not seen them.  Discussed that there is documentation that 2 messages were left for patient to schedule, however she is unaware of any of this.   She is taking tramadol and medical cannabis for pain.  Following with Pall Care.   Continues following with Neuro-Onc.  Her aide reports patient had a seizure consisting of an episode of her "zoning out" on 3/13; they notified Neuro-Onc.   Saw Derm for chest skin lesion status post biopsy on 2/14/2024 revealing invasive squamous cell carcinoma.  Patient is supposed to see surgeon for Mohs procedure.  Restaging Brain MRI this month with sustained intracranial response.

## 2024-03-21 ENCOUNTER — APPOINTMENT (OUTPATIENT)
Dept: HEPATOLOGY | Facility: CLINIC | Age: 84
End: 2024-03-21

## 2024-03-26 ENCOUNTER — NON-APPOINTMENT (OUTPATIENT)
Age: 84
End: 2024-03-26

## 2024-03-26 ENCOUNTER — APPOINTMENT (OUTPATIENT)
Dept: DERMATOLOGY | Facility: CLINIC | Age: 84
End: 2024-03-26
Payer: MEDICARE

## 2024-03-26 PROCEDURE — 12032 INTMD RPR S/A/T/EXT 2.6-7.5: CPT

## 2024-03-26 PROCEDURE — 17313 MOHS 1 STAGE T/A/L: CPT

## 2024-03-26 NOTE — HISTORY OF PRESENT ILLNESS
[FreeTextEntry1] : Mohs Consultation and Surgery for an SCC on the central chest [de-identified] : 03/26/2024 Mohs Consultation and Surgery for an SCC on the central chest  Referred by Dr. Encarnacion  We had the pleasure of seeing your patient in consultation for Mohs Micrographic Surgery. The lesion has been present for many years, and has been picked off repeatedly.   Patient comes with her son, Dick, and brice to the office visit.  LANIE BERTRAND is a 83 year old F who presents for Mohs Consultation and Surgery for an SCC on the central chest.  Skin Cancer History: none prior  SH: Is retired. Used to work as a . Upcoming travel plans: none  Allergies: NKA Smoking: former  Pertinent positives noted below  History of HIV or hepatitis: No Blood thinners: eliquis  Antibiotic Prophylaxis: None  Medical implants: Yes - L shoulder  The patient's review of systems questionnaire was reviewed. Education needs were identified. There were no barriers to learning.

## 2024-03-26 NOTE — PHYSICAL EXAM
[Alert] : alert [Well Nourished] : well nourished [Conjunctiva Non-injected] : conjunctiva non-injected [No Visual Lymphadenopathy] : no visual  lymphadenopathy [No Clubbing] : no clubbing [No Edema] : no edema [No Bromhidrosis] : no bromhidrosis [No Chromhidrosis] : no chromhidrosis [FreeTextEntry3] : -- 1.6 x 1.0 cm pink gritty scaly plaque on the central chest -- no axillary LAD

## 2024-04-01 ENCOUNTER — APPOINTMENT (OUTPATIENT)
Dept: GERIATRICS | Facility: CLINIC | Age: 84
End: 2024-04-01
Payer: MEDICARE

## 2024-04-01 DIAGNOSIS — M54.42 LUMBAGO WITH SCIATICA, LEFT SIDE: ICD-10-CM

## 2024-04-01 PROCEDURE — 99214 OFFICE O/P EST MOD 30 MIN: CPT

## 2024-04-01 NOTE — REASON FOR VISIT
[Home] : at home, [unfilled] , at the time of the visit. [Patient] : the patient [Follow-Up] : a follow-up visit [Other: _____] : [unfilled] [Medical Office: ([facility])___] : at the medical office located in

## 2024-04-03 NOTE — HISTORY OF PRESENT ILLNESS
[FreeTextEntry1] : 83yoF with stage IV NSCLC presents for follow-up palliative care visit.  PMH significant for NSTEMI, DM2, Afib with RVR and RML PE, chronic low back pain, former smoker.   Patient reports that she was suffering with chronic low back pain and progressive gait instability, was evaluated by orthopedist in 2022. She underwent imaging of the spinerain imaging demonstrated evidence of metastatic disease including a dominant right temporal lobe 3.1 cm enhancing mass with vasogenic edema. CT scan of her body demonstrated a ANTHONY tumor with subcentimeter pulmonary nodules and a nonspecific adrenal nodule. Bronchoscopy with biopsy performed on 2022 demonstrated adenocarcinoma consistent with lung primary; tumor tested PD-L1 negative and was negative for actionable mutations. She underwent craniotomy resection of the dominant brain mass on 2022 with pathology revealing the same findings. Hospital course was complicated by NSTEMI, A. fib with RVR and RML PE. She was put on medications for seizure prophylaxis. She was put on anticoagulation and currently remains on prophylactic dosing of anticoagulation with enoxaparin 40 mg subcu daily, pending clearance for therapeutic anticoagulation by neurosurgery. She was subsequently discharged to acute rehab on 8/. Has initiated home PT services.   Pt met with Dr. Funes yesterday for evaluation for RT.  Patient presents to establish with palliative care early on.  She reports fatigue and weakness as her major issues lately.   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 She remains on a prednisone taper, currently 5mg, for autoimmune hepatitis. She follows with hepatology and her liver enzymes have now normalized.  Appetite is good. Constipated, moves her bowels every two to three days with Miralax as-needed. Mood fluctuates, she is frustrated with her loss of independence and ongoing pain.   Interval history (24): Patient seen via telemedicine for palliative medicine follow up, accompanied by her HHA.  Main reason for visit today is to discuss acute on chronic pain.  Pt describes worsening of pain in her left low back, by the iliac crest. This pain is different for her as she typically has suffered with pain in her mid-back. The pain is not much of an issue when she lays down flat . She is now on oxycodone 2.5mg PRN as her use of the tramadol was increasing. The oxycodone is being taken every 6 hours but is not helping her pain at all. Remains on gabapentin 100mg QAM and 300mg QHS. She uses medical cannabis 1:1 tincture BID for help with appetite. Has not benefited this acute pain.  Of note pt has a long history of spine pain related to spinal stenosis, this has been managed in the past with epidural injections and a pain regimen with Dr. Salazar. Her most recent epidural injection provided relief for ~4 days, she does not wish to try any further interventional procedures. She has stopped following with Dr. Salazar due to her limited ability to attend multiple physician appointments, and instead has been following closely with palliative care while at [facility]. She has significant weakness in her legs due to the pain and feels off balance. She ambulates with a walker.   ROS: + mood labile + urticaria  + has been losing her hair (this precedes her cancer diagnosis) + appetite is low; improved + constipation, uses PRN Miralax + sleep disturbances, finds medical cannabis helpful +recent MOHS for SCC of chest wall Denies n/v Remainder of ROS non-contributory  Patient is , lives alone. She has one son who lives nearby (two other sons are ). She has a 24/7 HHA since being home, is requiring assistance with her ADLs. She has a shower chair, grab bars. Has not had any falls since being home. Prior to the surgery she was independent, driving.   PMD/Cardiologist: Dr. Kimani Uribe  Endocrinologist: Dr. Chaz Richardson  I-STOP Ref#: [id number]

## 2024-04-03 NOTE — ASSESSMENT
[______] : HCP: [unfilled] [FreeTextEntry1] : 83yoF with:  # Stage IV Lung Adenocarcinoma - Keytruda (on hold since 11/2023); s/p gamma knife surgery. Med Onc follow up.  # Chronic lower back pain/hip pain - Patient has a multitude of lumbar spine issues as indicated on MRI LS Spine (7/2022).  Assessment is limited today given the visit is done virtually.  - C/w Tylenol 500-1000mg PRN. - Increase gabapentin to 200mg QAM and C/w Gabapentin to 300mg QHS. Will continue to increase gabapentin to therapeutic dosing as tolerated. -Increase Oxycodone dose to 5mg Q4h PRN, log usage.  Counseled on importance of maintaining bowel regularity in light of regular opioid use. - Previously provided referral information for the spine center for possible rehab and alternative approaches to her back pain.  -MRI L spine ordered.   # Decreased appetite - c/w medicinal cannabis 1:1  # Constipation - Recommend use of Miralax daily.  # Adjustment d/o - Empathetic listening and validation of patient's emotions provided.  # Encounter for palliative care- Health Care Proxy (HCP) form on file.  Follow up in 3 weeks, call with questions or issues.

## 2024-04-03 NOTE — DATA REVIEWED
[FreeTextEntry1] : MRI Brain (3/7/2024)  FINDINGS:  Redemonstration of right parietotemporal craniotomy.  Redemonstration of right temporal surgical cavity with similar nonspecific surrounding T2 and FLAIR hyperintense signal. Similar ex vacuo dilatation of the adjacent right temporal horn.  Slightly increased size of curvilinear focus of enhancement along the superomedial aspect of the right temporal surgical cavity, currently 5 x 2 mm, previously 4 x 2 mm (9-61).  No new abnormal parenchymal enhancement. No abnormal leptomeningeal enhancement.  Ventricles are persistently mildly dilated.  No acute intracranial hemorrhage, restricted diffusion, or acute territorial infarct.  Mild white matter microvascular ischemic disease.  Narrowing of the right skull base ICA flow void.  The visualized paranasal sinuses and mastoid air cells are clear.  The orbits, sellar and suprasellar structures, and craniocervical junction are unremarkable.  MR perfusion demonstrates decreased relative cerebral blood flow and relative cerebral blood volume in the region of the right temporal surgical cavity and surrounding T2 and FLAIR hyperintense signal. Tiny enhancing focus along the superomedial aspect of the surgical cavity is poorly evaluated due to its small size.  IMPRESSION: Redemonstration of right temporal surgical cavity with similar nonspecific surrounding T2 and FLAIR hyperintense signal which likely represents gliosis/postsurgical changes. Similar ex vacuo dilatation of the adjacent right temporal horn.  Slightly increased size of curvilinear focus of enhancement along the superomedial aspect of the right temporal surgical cavity, currently 5 x 2 mm, previously 4 x 2 mm (9-61).  No new abnormal parenchymal enhancement. No abnormal leptomeningeal enhancement.  MR perfusion demonstrates decreased relative cerebral blood flow and relative cerebral blood volume in the region of the right temporal surgical cavity and surrounding T2 and FLAIR hyperintense signal, suggesting the presence of posttreatment/postsurgical changes. Tiny enhancing focus along the superomedial aspect of the surgical cavity is poorly evaluated due to its small size.  --- End of Report ---

## 2024-04-24 ENCOUNTER — RX RENEWAL (OUTPATIENT)
Age: 84
End: 2024-04-24

## 2024-04-24 RX ORDER — SENNOSIDES 8.6 MG TABLETS 8.6 MG/1
8.6 TABLET ORAL
Qty: 180 | Refills: 0 | Status: ACTIVE | COMMUNITY
Start: 2024-03-19 | End: 1900-01-01

## 2024-04-25 ENCOUNTER — APPOINTMENT (OUTPATIENT)
Dept: CT IMAGING | Facility: IMAGING CENTER | Age: 84
End: 2024-04-25
Payer: MEDICARE

## 2024-04-25 ENCOUNTER — OUTPATIENT (OUTPATIENT)
Dept: OUTPATIENT SERVICES | Facility: HOSPITAL | Age: 84
LOS: 1 days | End: 2024-04-25
Payer: MEDICARE

## 2024-04-25 DIAGNOSIS — C34.12 MALIGNANT NEOPLASM OF UPPER LOBE, LEFT BRONCHUS OR LUNG: ICD-10-CM

## 2024-04-25 DIAGNOSIS — I25.10 ATHEROSCLEROTIC HEART DISEASE OF NATIVE CORONARY ARTERY WITHOUT ANGINA PECTORIS: Chronic | ICD-10-CM

## 2024-04-25 PROCEDURE — 71250 CT THORAX DX C-: CPT | Mod: 26,MH

## 2024-04-25 PROCEDURE — 71250 CT THORAX DX C-: CPT

## 2024-04-29 ENCOUNTER — OUTPATIENT (OUTPATIENT)
Dept: OUTPATIENT SERVICES | Facility: HOSPITAL | Age: 84
LOS: 1 days | Discharge: HOME | End: 2024-04-29

## 2024-04-29 DIAGNOSIS — C34.90 MALIGNANT NEOPLASM OF UNSPECIFIED PART OF UNSPECIFIED BRONCHUS OR LUNG: ICD-10-CM

## 2024-04-29 DIAGNOSIS — I25.10 ATHEROSCLEROTIC HEART DISEASE OF NATIVE CORONARY ARTERY WITHOUT ANGINA PECTORIS: Chronic | ICD-10-CM

## 2024-04-29 DIAGNOSIS — I73.9 PERIPHERAL VASCULAR DISEASE, UNSPECIFIED: Chronic | ICD-10-CM

## 2024-05-02 ENCOUNTER — APPOINTMENT (OUTPATIENT)
Dept: MRI IMAGING | Facility: CLINIC | Age: 84
End: 2024-05-02
Payer: MEDICARE

## 2024-05-02 ENCOUNTER — OUTPATIENT (OUTPATIENT)
Dept: OUTPATIENT SERVICES | Facility: HOSPITAL | Age: 84
LOS: 1 days | End: 2024-05-02
Payer: MEDICARE

## 2024-05-02 DIAGNOSIS — I73.9 PERIPHERAL VASCULAR DISEASE, UNSPECIFIED: Chronic | ICD-10-CM

## 2024-05-02 DIAGNOSIS — I25.10 ATHEROSCLEROTIC HEART DISEASE OF NATIVE CORONARY ARTERY WITHOUT ANGINA PECTORIS: Chronic | ICD-10-CM

## 2024-05-02 DIAGNOSIS — M54.42 LUMBAGO WITH SCIATICA, LEFT SIDE: ICD-10-CM

## 2024-05-02 PROCEDURE — A9585: CPT

## 2024-05-02 PROCEDURE — 72158 MRI LUMBAR SPINE W/O & W/DYE: CPT | Mod: 26,MH

## 2024-05-02 PROCEDURE — 72158 MRI LUMBAR SPINE W/O & W/DYE: CPT

## 2024-05-03 ENCOUNTER — APPOINTMENT (OUTPATIENT)
Dept: HEMATOLOGY ONCOLOGY | Facility: CLINIC | Age: 84
End: 2024-05-03
Payer: MEDICARE

## 2024-05-03 DIAGNOSIS — D50.9 IRON DEFICIENCY ANEMIA, UNSPECIFIED: ICD-10-CM

## 2024-05-03 DIAGNOSIS — C79.31 SECONDARY MALIGNANT NEOPLASM OF BRAIN: ICD-10-CM

## 2024-05-03 DIAGNOSIS — I26.99 OTHER PULMONARY EMBOLISM W/OUT ACUTE COR PULMONALE: ICD-10-CM

## 2024-05-03 DIAGNOSIS — K71.9 TOXIC LIVER DISEASE, UNSPECIFIED: ICD-10-CM

## 2024-05-03 PROCEDURE — 99214 OFFICE O/P EST MOD 30 MIN: CPT

## 2024-05-03 PROCEDURE — G2211 COMPLEX E/M VISIT ADD ON: CPT

## 2024-05-06 PROBLEM — D50.9 ANEMIA, IRON DEFICIENCY: Status: ACTIVE | Noted: 2022-10-13

## 2024-05-06 PROBLEM — I26.99 PULMONARY EMBOLUS: Status: ACTIVE | Noted: 2022-08-31

## 2024-05-06 PROBLEM — C79.31 METASTASIS TO BRAIN: Status: ACTIVE | Noted: 2022-09-09

## 2024-05-06 PROBLEM — K71.9 DRUG-INDUCED LIVER INJURY: Status: ACTIVE | Noted: 2024-01-02

## 2024-05-06 NOTE — ASSESSMENT
[FreeTextEntry1] : Metastatic NSCLC with adenocarcinoma histology; tumor tested PDL1 Negative and lacks actionable mutations but tested TMB-High. Treated with GK-SRS for brain metastases in Sept 2022. Started first line single agent Pembrolizumab in Oct 2022; achieved [place]. 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  She is s/p hospitalization Dec 2023 and started on steroids with normalization of liver enzymes.  Completed prednisone taper and monitored off systemic therapy.   Restaging CT chest late April 2024 with sustained response with resolution of prior lung opacities. 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  She completed Prednisone taper.  Follow-up with hepatology.   -Back pain: Pt with h/o spinal stenosis and longstanding chronic back pain that predates her diagnosis. Follows with outside pain management Dr. Ryder Salazar ([phone]). Follows with \[facility]\"" Care. Takes gabapentin, oxycodone and medical cannabis.  Place PMR referral again.  - Brain Metastases: F/U with Radiation oncology s/p GK-SRS to brain metastases. Follows with Neuro-Onc as well. On [place].  MRI Brain from March 2023 with sustained intracranial response.  A 4 month brain MRI is planned (July).    - PE: Continue Eliquis anticoagulation indefinitely given PE and Afib. - Continue follow-up and management with her PCP/cardiologist for her other ongoing medical problems and comorbidities. -Iron-deficiency anemia:  s/p PO iron repletion.  Monitor hemoglobin and need for further iron repletion.   - F/U with Derm s/p Mohs procedure for SCC on chest. -Obtain labs at [facility]:   - Restaging PET/CT in late July (3 months) and f/u to review results.

## 2024-05-06 NOTE — PHYSICAL EXAM
[Ambulatory and capable of all self care but unable to carry out any work activities] : Status 2- Ambulatory and capable of all self care but unable to carry out any work activities. Up and about more than 50% of waking hours [Normal] : affect appropriate [de-identified] : NAD [de-identified] : No icterus  [de-identified] : PAU [de-identified] : No LAD  [de-identified] : Not SOB [de-identified] : No edema

## 2024-05-06 NOTE — HISTORY OF PRESENT ILLNESS
[Disease: _____________________] : Disease: [unfilled] [AJCC Stage: ____] : AJCC Stage: [unfilled] [Home] : at home, [unfilled] , at the time of the visit. [Medical Office: ([facility])___] : at the medical office located in  [Formal Caregiver] : formal caregiver [Verbal consent obtained from patient] : the patient, [unfilled] [FreeTextEntry4] : Rogelio Mckeon [de-identified] : Patient is a former smoker with multiple comorbidities was undergoing orthopedic evaluation as an outpatient due to ongoing chronic back pain and gait imbalance.  She ultimately presented to [facility] ED on 7/19/2022.  Brain imaging demonstrated evidence of metastatic disease including a dominant right temporal lobe 3.1 cm enhancing mass with vasogenic edema.  CT scan of her body demonstrated a ANTHONY tumor with subcentimeter pulmonary nodules and a nonspecific adrenal nodule.  Bronchoscopy with biopsy performed on 7/20/2022 demonstrated adenocarcinoma consistent with lung primary; tumor tested PD-L1 negative and was negative for actionable mutations. however, tested TMB-High.  She underwent craniotomy resection of the dominant brain mass on 7/27/2022 with pathology revealing the same findings.  Hospital course was complicated by NSTEMI, Afib with RVR and RML PE.  She was put on medications for seizure prophylaxis.  She was subsequently discharged to acute rehab on 8/10-8/26/2022.  Treated with GK-SRS to brain metastases in Sept 2022. Started 1st line single agent Pembro in Oct 2022; achieved [place].  Patient was treated through early Nov 2023 when the immunotherapy was held after development of transaminitis and hyperbilirubinemia concering for ICI-induced hepatitis.  Patient is status post hospital admission 12/9 - 12/18/2023 after presenting with elevated liver enzymes, originally noted by endocrinologist, which were worsening.  Abdominal ultrasound revealed a left renal cyst and otherwise was unremarkable.  Hepatology was consulted.  Acute hepatitis panel was negative.  CT abdomen and MRI abdomen were uninformative with regards to etiology of transaminitis.  Patient was started on steroids with prednisone 40 mg daily with improvement in the LFTs.  Liver biopsy was avoided.  She was treated for UTI during her hospital stay.  DM was medically managed given the addition of the steroids.  Monitored off systemic therapy after last dose of Pembro in Nov 2023.   [de-identified] : - Left mainstem bronchus biopsy 7/20/2022: Poorly differentiated adenocarcinoma of lung origin.\par  PD-L1 negative/0%.\par  Foundation One:  No actionable mutations (TP53+, among others); TMB-High.  \par  -Right brain tumor resection 7/27/2022: Metastatic non-small cell carcinoma, favor adenocarcinoma, consistent with lung origin.\par  PD-L1 negative. [de-identified] : Patient started first-line single agent Pembrolizumab in Oct 2022; achieved [place].  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  Treated with steroids which she responded to.  Monitored off systemic therapy.    Today's visit is via telehealth; her aide is present.   Patient underwent Mohs surgery for squamous cell carcinoma of the skin of her chest in March 2024. Patient has ongoing significant LBP.  She has been following with palliative care.  She is utilizing medical cannabis as well as oxycodone as well as gabapentin. Patient was sent for MRI L-spine performed on 5/2/2024 ordered by palliative care revealing multilevel discogenic degenerative disease, arthropathy, stenosis. Again discussed PMR evaluation; she wanted them to reach out to her son to schedule. She reports ongoing pruritus. She is not on any prednisone.  Restaging CT chest late April 2024 with sustained response with resolution of prior lung opacities.

## 2024-05-06 NOTE — RESULTS/DATA
[FreeTextEntry1] : -CT Brain 7/18/22:  Cystic versus a necrotic masses measure approximately 4.8 x 3.2 x 3.2 cm in the right temporal lobe and 1.4 x 1.3 x 1.6 cm in the medial left frontal lobe.  Moderate to large amount of vasogenic edema in the right frontal, parietal and temporal lobes. Trace edema in the left frontal lobe.  Regional mass effect in the right cerebral hemisphere with partial effacement of the ventricular system and 5 mm subfalcine herniation of the right frontal lobe underneath the cerebral falx.  The findings may represent intracranial metastatic disease versus glioblastoma. Contrast-enhanced brain MRI is recommended for further evaluation.  -CT C/A/P 7/18/22:  Left upper lobe lung mass with partial atelectasis of the left upper lobe, concerning for primary lung neoplasm. Few scattered sub-4 mm pulmonary nodules, indeterminate.  Indeterminant left adrenal nodule for which contrast enhanced MRI is recommended for further evaluation.  -MRI Brain 7/20/22:  Right temporal lobe peripherally enhancing, centrally necrotic 3.1 x 2.6 cm mass demonstrating mild diffusion restriction. There is surrounding vasogenic edema. 8 mm leftward midline shift. Right uncal herniation. Additional left parafalcine 1.3 x 0.9 cm rim-enhancing cystic lesion with mild surrounding vasogenic edema. Tiny 0.3 x 0.3 cm rim-enhancing lesion within the left frontal cortex. Findings are concerning for metastatic disease in the context of patient's known primary lung cancer. The left lateral ventricle is mildly dilated.  -LE Dopplers 7/24/22:  No evidence of deep venous thrombosis in either lower extremity. -LE Dopplers 7/28/22:  No evidence of deep venous thrombosis in either lower extremity.  -CT Chest Angio 8/3/22:  Pulmonary embolism in the lateral segmental branch of the right middle lobe. No evidence of right heart strain.  Interval decrease in left upper lobe mass.  Unchanged obstruction of the posterior bronchus, and stable fibroglandular lymph node.  -Abdominal U/S 8/4/22:   Mildly distended gallbladder. No evidence of acute cholecystitis.  Possible mild hydronephrosis.  -LE Dopplers 8/7/22:  No evidence of deep venous thrombosis in either lower extremity.  -LE Dopplers 9/1/22:  No evidence of deep venous thrombosis in either lower extremity.  -PET/CT 9/9/22:  Abnormal FDG-PET/CT scan. 1. FDG-avid left upper lobe lung mass abutting the mediastinum at level of AP window, with associated atelectasis along the mediastinum, unchanged as compared to CT dated 8/3/2022, corresponds to patient's known adenocarcinoma. 2. Right temporal craniotomy with photopenic low attenuation within right temporal lobe, compatible with vasogenic edema, gliosis, and/or postsurgical changes, better evaluated on contrast-enhanced CT or MRI. 3. Findings suggestive of left vocal cord paralysis, likely secondary to left upper lobe lung mass. Please correlate clinically and with direct visualization, as indicated. 4. Nonspecific hypermetabolism in small bowel and colon likely is related to metformin. 5. No scan evidence of metastatic disease.  -PET/CT 12/8/22:  1. FDG-avid left upper lobe lung nodule is decreased in size and metabolism, compatible with a partial response to interval therapy. Interval decrease in adjacent left upper lobe paramediastinal opacity, likely atelectasis, which is unchanged in metabolism. 2. Right temporal craniotomy with photopenic low attenuation within right temporal lobe, compatible with vasogenic edema, gliosis, and/or postsurgical changes, better evaluated on contrast-enhanced CT or MRI, not significantly changed. 3. Findings suggestive of left vocal cord paralysis, likely secondary to left upper lobe lung mass, unchanged. 4. Nonspecific hypermetabolism in small bowel and colon, unchanged, likely related to metformin. 5. No scan evidence of metastatic disease.  -MRI 12/8/22:  Interval decrease in size and peripheral enhancement of the medial left frontal lesion. Smaller ring-enhancing lesion more laterally along the left frontal cortex no longer visualized.  No new lesions seen.  Postsurgical cavity right temporal lobe with mild adjacent enhancement. Continued follow-up recommended.  -MRI Brain 2/23/23:  Stable medial left frontal lobe nonenhancing cystic lesion. Stable linear enhancement medial to the right temporal lobe surgical cavity. No new areas abnormal enhancement. Continued follow-up is recommended  -PET/CT 3/2/23:  Compared to FDG PET/CT dated 12/8/2022: 1. FDG-avid left upper lobe lung nodule is not significantly changed in size or metabolism, compatible with stable disease. Interval decrease in size and metabolism of adjacent left upper lobe opacity abutting the mediastinum. 2. Nonspecific FDG-avid skin thickening, bilateral mid/lower anterior abdominal wall with new associated subcutaneous stranding, and increased in metabolism, likely secondary to inflammation. Please correlate clinically. 3. Right temporal craniotomy with photopenic low attenuation within right temporal lobe, compatible with vasogenic edema, gliosis, and/or postsurgical changes, better evaluated on contrast-enhanced CT or MRI, not significantly changed. 4. Nonspecific pancolonic hypermetabolism, not significantly changed, likely is related to metformin. 5. No scan evidence of metastatic disease.  -MRI Brain 6/15/23:  Stable right temporal postoperative changes. Stable subcentimeter focus of enhancement along the medial aspect of the cavity, likely posttreatment changes. No new or worsening enhancement identified.  -PET/CT 6/22/23:  Compared to FDG PET/CT dated 3/2/2023: 1. Mildly FDG-avid left upper lobe lung nodule is not significantly changed in size or metabolism, compatible with stable disease. A minimally FDG-avid adjacent left upper lobe opacity abutting the mediastinum is decreased in size and unchanged and avidity. 2. Right temporal craniotomy with photopenic low attenuation within right temporal lobe, compatible with posttreatment changes, unchanged. 3. Nonspecific diffuse distal small bowel hypermetabolism, increased as compared to prior study, and pancolonic hypermetabolism, decreased as compared to prior study, without corresponding abnormalities on CT, likely are related to metformin. 4. Nonspecific mildly FDG-avid skin thickening and subcutaneous stranding, bilateral mid/lower anterior abdominal wall, decreased as compared to prior study. Please correlate clinically. 5. Remainder of study demonstrates no evidence of FDG-avid disease.  -MRI Brain 7/16/23: No significant interval change from 6/15/2023.  Similar-appearing postsurgical encephalomalacia and gliosis in the right mid temporal lobe. Similar 5 mm focus of enhancement along the mesial aspect of the surgical cavity, likely postsurgical in nature. No new abnormal parenchymal or leptomeningeal enhancement. Similar nonspecific T2 and FLAIR hyperintense signal surrounding the surgical cavity, likely representing gliosis.  -CT PERFUSION 7/16/23: Perfusion abnormality localized to hypodense region in the temporal lobe, related to postoperative changes rather than true perfusion defect. No evidence of core infarct or ischemic penumbra otherwise.  -CT ANGIOGRAPHY NECK 7/16/23: Patent vasculature without occlusion. Changes of prior right carotid endarterectomy. Marked narrowing and luminal irregularity of the proximal right internal carotid artery just beyond the origin with limited assessment of stenosis by NASCET criteria given the overall diminutive caliber to the cervical right internal carotid artery. Moderate stenosis origin left internal carotid artery by NASCET criteria. No critical stenosis or dissection.  -CT ANGIOGRAPHY BRAIN 7/16/23: No vessel occlusion, flow-limiting stenosis or aneurysm. NASCET CAROTID STENOSIS CRITERIA (distal normal appearing ICA as denominator for measurement): 0%-none, 1-49%-mild, 50-70%-moderate, 70-89%-severe, 90-99%-critical.  -MRI Brain 7/17/23:  No significant interval change from 6/15/2023.  Similar-appearing postsurgical encephalomalacia and gliosis in the right mid temporal lobe.  Similar 5 mm focus of enhancement along the mesial aspect of the surgical cavity, likely postsurgical in nature.  No new abnormal parenchymal or leptomeningeal enhancement.  Similar nonspecific T2 and FLAIR hyperintense signal surrounding the surgical cavity, likely representing gliosis.  -PET/CT 10/5/23:  Compared to FDG PET/CT dated 6/22/2023: 1. Small difficult to delineate FDG-avid focus in left upper lobe paramediastinal region is further decreased in size and metabolism. Small focus of residual disease is not excluded. Minimally FDG-avid linear left upper lobe opacity abutting the mediastinum, not significantly changed, may represent a combination of atelectasis and treated disease. 2. Nonspecific diffuse distal small bowel and pancolonic hypermetabolism, not significantly changed, likely is related to metformin. 3. Nonspecific mildly FDG-avid skin thickening and subcutaneous stranding, bilateral mid/lower anterior abdominal wall, decreased in thickness and unchanged in metabolism. Please correlate clinically. 4. Remainder of study demonstrates no evidence of FDG-avid disease. No new lesion.  -MRI Brain 11/2/23: stable exam compared to 7/17/2023  -US Abdomen 11/16/23:  Left renal cyst. Otherwise, unremarkable abdominal sonogram.  -CT Abdomen/Pelvis 12/9/23:  MIld fat stranding surrounding the 1st and 2nd portions of the duodenum of uncertain etiology, however raising the possibility of duodenal inflammation.  Periportal lymph node, new since July 18, 2022.  -MR Abdomen 12/11/23:   * Mild periportal edema. Otherwise unremarkable appearance of the liver. * No biliary duct dilation. * Mild nonspecific edematous gallbladder wall thickening, potentially reactive to the liver. Consider correlation with right upper quadrant ultrasound as warranted. * Nonspecific periportal lymphadenopathy.  -MBS 12/15/23:  There was no evidence of penetration or aspiration with the tested consistencies.  -CT Head 12/17/23:  Stable exam. No acute intracranial hemorrhage, mass effect, or midline shift.  Stable postsurgical changes in the right temporal bone, with subadjacent encephalomalacia and gliosis.  -PET/CT 1/25/24:   1. Compared to PET/CT 10/5/2023, there has been interval development of FDG avid groundglass opacities in the left upper lobe, one measuring 1.1 x 0.8 cm, SUV 2.6, and one linear opacity measuring 1.4 x 0.5 cm, SUV 3. Recurrent disease is a consideration, as well as inflammatory/infectious changes. Note that there are additional groundglass opacities in the lungs which have increased since prior exam, which are non-FDG avid. Consider clinical correlation for signs and symptoms of infection and interval short-term follow-up PET/CT for further evaluation. 2. Compared with PET/CT 10/5/2023, there has been interval development of an FDG avid skin nodule in the anterior chest just right of midline. Recommend clinical examination for signs of neoplasm or infection.  Images Reviewed/Interpreted:  - MRI Brain 3/7/24:  Redemonstration of right temporal surgical cavity with similar nonspecific surrounding T2 and FLAIR hyperintense signal which likely represents gliosis/postsurgical changes. Similar ex vacuo dilatation of the adjacent right temporal horn. Slightly increased size of curvilinear focus of enhancement along the superomedial aspect of the right temporal surgical cavity, currently 5 x 2 mm, previously 4 x 2 mm. No new abnormal parenchymal enhancement. No abnormal leptomeningeal enhancement. MR perfusion demonstrates decreased relative cerebral blood flow and relative cerebral blood volume in the region of the right temporal surgical cavity and surrounding T2 and FLAIR hyperintense signal, suggesting the presence of posttreatment/postsurgical changes. Tiny enhancing focus along the superomedial aspect of the surgical cavity is poorly evaluated due to its small size.  -CT Chest 4/25/24:  No interval change in a linear opacity in the paramediastinal left upper lobe corresponding to patient's known lung cancer. The previously seen groundglass opacities in the left upper lobe has resolved.  -MRI L-Spine 5/2/24:  Multilevel discogenic degenerative disease and facet arthropathy of the lumbar spine, most pronounced at L2-L3 where there is moderate central canal narrowing, severe left neural foraminal narrowing and moderate right neural foraminal narrowing. Additional varying degrees of central canal and neural foraminal narrowing, as above.

## 2024-05-08 NOTE — REASON FOR VISIT
[Home] : at home, [unfilled] , at the time of the visit. [Medical Office: ([facility])___] : at the medical office located in  [Patient] : the patient [Follow-Up] : a follow-up visit [Other: _____] : [unfilled]

## 2024-05-09 ENCOUNTER — APPOINTMENT (OUTPATIENT)
Dept: GERIATRICS | Facility: CLINIC | Age: 84
End: 2024-05-09
Payer: MEDICARE

## 2024-05-09 DIAGNOSIS — G89.29 OTHER CHRONIC PAIN: ICD-10-CM

## 2024-05-09 DIAGNOSIS — M25.559 PAIN IN UNSPECIFIED HIP: ICD-10-CM

## 2024-05-09 DIAGNOSIS — R53.83 OTHER FATIGUE: ICD-10-CM

## 2024-05-09 DIAGNOSIS — C34.12 MALIGNANT NEOPLASM OF UPPER LOBE, LEFT BRONCHUS OR LUNG: ICD-10-CM

## 2024-05-09 DIAGNOSIS — R63.0 ANOREXIA: ICD-10-CM

## 2024-05-09 DIAGNOSIS — M54.9 DORSALGIA, UNSPECIFIED: ICD-10-CM

## 2024-05-09 DIAGNOSIS — M54.50 LOW BACK PAIN, UNSPECIFIED: ICD-10-CM

## 2024-05-09 DIAGNOSIS — Z51.5 ENCOUNTER FOR PALLIATIVE CARE: ICD-10-CM

## 2024-05-09 PROCEDURE — 99215 OFFICE O/P EST HI 40 MIN: CPT

## 2024-05-09 NOTE — HISTORY OF PRESENT ILLNESS
[FreeTextEntry1] : 84yoF with stage IV NSCLC presents for follow-up palliative care visit.  PMH significant for NSTEMI, DM2, Afib with RVR and RML PE, chronic low back pain, former smoker.   Patient reports that she was suffering with chronic low back pain and progressive gait instability, was evaluated by orthopedist in 2022. She underwent imaging of the spinerain imaging demonstrated evidence of metastatic disease including a dominant right temporal lobe 3.1 cm enhancing mass with vasogenic edema. CT scan of her body demonstrated a ANTHONY tumor with subcentimeter pulmonary nodules and a nonspecific adrenal nodule. Bronchoscopy with biopsy performed on 2022 demonstrated adenocarcinoma consistent with lung primary; tumor tested PD-L1 negative and was negative for actionable mutations. She underwent craniotomy resection of the dominant brain mass on 2022 with pathology revealing the same findings. Hospital course was complicated by NSTEMI, A. fib with RVR and RML PE. She was put on medications for seizure prophylaxis. She was put on anticoagulation and currently remains on prophylactic dosing of anticoagulation with enoxaparin 40 mg subcu daily, pending clearance for therapeutic anticoagulation by neurosurgery. She was subsequently discharged to acute rehab on 8/. Has initiated home PT services.   Pt met with Dr. Funes yesterday for evaluation for RT.  Patient presents to establish with palliative care early on.  She reports fatigue and weakness as her major issues lately.   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 She remains on a prednisone taper, currently 5mg, for autoimmune hepatitis. She follows with hepatology and her liver enzymes have now normalized.  Appetite is good. Constipated, moves her bowels every two to three days with Miralax as-needed. Mood fluctuates, she is frustrated with her loss of independence and ongoing pain.   Interval history (24): Patient seen via telemedicine for palliative medicine follow up, accompanied by her HHA.  Main reason for visit today is to discuss acute on chronic pain.  Pt describes worsening of pain in her left low back, by the iliac crest. This pain is different for her as she typically has suffered with pain in her mid-back. The pain is not much of an issue when she lays down flat . She is now on oxycodone 5mg TID. The oxycodone is being taken every 6 hours but is not helping her pain at all. Remains on gabapentin 200mg QAM and 300mg QHS. She uses medical cannabis 1:1 tincture QHS for help with appetite, sleep. Has not benefited this acute pain.  Of note pt has a long history of spine pain related to spinal stenosis, this has been managed in the past with epidural injections and a pain regimen with Dr. Salazar. Her most recent epidural injection provided relief for ~4 days, she does not wish to try any further interventional procedures. She has stopped following with Dr. Salazar due to her limited ability to attend multiple physician appointments, and instead has been following closely with palliative care while at [facility]. She has significant weakness in her legs due to the pain and feels off balance. She ambulates with a walker and unable to stand straight.   ROS: + mood labile + urticaria  + has been losing her hair (this precedes her cancer diagnosis) + appetite is low; improved + constipation, uses PRN Miralax + sleep disturbances, finds medical cannabis helpful +recent MOHS for SCC of chest wall Denies n/v Remainder of ROS non-contributory  Patient is , lives alone. She has one son who lives nearby (two other sons are ). She has a 24/7 HHA since being home, is requiring assistance with her ADLs. She has a shower chair, grab bars. Has not had any falls since being home. Prior to the surgery she was independent, driving.   PMD/Cardiologist: Dr. Kimani Uribe  Endocrinologist: Dr. Chaz Richardson  I-STOP Ref#: [id number]

## 2024-05-09 NOTE — ASSESSMENT
[______] : HCP: [unfilled] [FreeTextEntry1] : 84yoF with:  # Stage IV Lung Adenocarcinoma - Keytruda (on hold since 11/2023); s/p gamma knife surgery. Med Onc follow up.  # Chronic lower back pain/hip pain - Patient has a multitude of lumbar spine issues as indicated on MRI LS Spine (7/2022).  Assessment is limited today given the visit is done virtually.  - C/w Tylenol 500-1000mg PRN. - Increase gabapentin to 200mg QAM and C/w Gabapentin to 300mg QHS. Will continue to increase gabapentin to therapeutic dosing as tolerated. -Increase Oxycodone dose to 7.5mg Q4h PRN, log usage.  Counseled on importance of maintaining bowel regularity in light of regular opioid use. - Previously provided referral information for the spine center for possible rehab and alternative approaches to her back pain.    # Decreased appetite - c/w medicinal cannabis 1:1  # Constipation - Recommend use of Miralax daily.  # Adjustment d/o - Empathetic listening and validation of patient's emotions provided.  # Encounter for palliative care- Health Care Proxy (HCP) form on file.  Follow up in 4 weeks, call with questions or issues.

## 2024-05-09 NOTE — DATA REVIEWED
[FreeTextEntry1] : MR Lumbar Spine (5/2/2024)    FINDINGS:  LOCALIZER: No additional findings. BONES: There is no fracture or bone marrow edema. Scattered Modic endplate changes, most pronounced at L2-L3. ALIGNMENT: Grade 1 anterolisthesis at L4-5 and L5-S1. SACROILIAC JOINTS/SACRUM: There is no sacral fracture. The SI joints are partially visualized but are intact. CONUS AND CAUDA EQUINA: The distal cord and conus are normal in signal. Conus terminates at L1. VISUALIZED INTRAPELVIC/INTRA-ABDOMINAL SOFT TISSUES: Normal. PARASPINAL SOFT TISSUES: Normal.   INDIVIDUAL LEVELS:  LOWER THORACIC SPINE: No spinal canal or neuroforaminal stenosis.  L1-L2: Broad-based posterior disc osteophyte complex and mild to moderate bilateral facet arthropathy result in moderate right neural foraminal narrowing, moderate to severe left neural foraminal narrowing but no significant central canal narrowing. L2-L3: Broad-based posterior disc bulge and moderate bilateral facet arthropathy result in severe left neural foraminal narrowing, moderate right neural foraminal narrowing and moderate central canal narrowing. There is bilateral lateral recess stenosis. L3-L4: Broad-based posterior disc bulge and moderate bilateral facet arthropathy result in moderate to severe right neural foraminal narrowing, moderate left neural foraminal narrowing and mild central canal narrowing. There is right lateral recess stenosis. L4-L5: Grade 1 anterolisthesis. Broad-based posterior disc bulge and moderate bilateral facet arthropathy result in moderate bilateral neural foraminal narrowing but no significant central canal narrowing. L5-S1: Grade 1 anterolisthesis. Broad-based posterior disc bulge and moderate bilateral facet arthropathy result in moderate to severe left neural foraminal narrowing, moderate right neural foraminal narrowing but no significant central canal narrowing.   IMPRESSION:  Multilevel discogenic degenerative disease and facet arthropathy of the lumbar spine, most pronounced at L2-L3 where there is moderate central canal narrowing, severe left neural foraminal narrowing and moderate right neural foraminal narrowing. Additional varying degrees of central canal and neural foraminal narrowing, as above.  --- End of Report ---      CT Chest (4/25/2024)  FINDINGS:  CARDIOVASCULAR, MEDIASTINUM:  Vasculature: Thoracic aorta: Normal in caliber and course with atherosclerotic calcifications.  Heart and pericardium: Normal heart size. Coronary artery calcifications. No pericardial effusion.  Esophagus: Normally decompressed.  LYMPH NODES: No mediastinal or axillary lymphadenopathy. No gross hilar adenopathy.  LUNGS/AIRWAYS/PLEURA: Patent central airways. No interval change in a linear opacity in the paramediastinal left upper lobe (series 2, image 35) corresponding to patient's known lung cancer. The previously seen groundglass opacities in the left upper lobe has resolved. Thin-walled cyst in the left lower lobe. No pleural effusion or pneumothorax.  UPPER ABDOMEN: Evaluation of the partially-imaged upper abdomen demonstrates no acute abnormality.  BONES/CHEST WALL: Asymmetric 9 mm soft tissue in the left breast (series 2, image 60) Status post right shoulder arthroplasty. Multilevel degenerative disc disease.  IMPRESSION:  No interval change in a linear opacity in the paramediastinal left upper lobe corresponding to patient's known lung cancer.  The previously seen groundglass opacities in the left upper lobe has resolved.  --- End of Report ---

## 2024-05-12 LAB
ALBUMIN SERPL ELPH-MCNC: 4.4 G/DL
ALP BLD-CCNC: 82 U/L
ALT SERPL-CCNC: 9 U/L
ANION GAP SERPL CALC-SCNC: 12 MMOL/L
AST SERPL-CCNC: 12 U/L
BASOPHILS # BLD AUTO: 0.06 K/UL
BASOPHILS NFR BLD AUTO: 0.9 %
BILIRUB DIRECT SERPL-MCNC: 0.1 MG/DL
BILIRUB INDIRECT SERPL-MCNC: 0.2 MG/DL
BILIRUB SERPL-MCNC: 0.3 MG/DL
BUN SERPL-MCNC: 26 MG/DL
CALCIUM SERPL-MCNC: 10.1 MG/DL
CHLORIDE SERPL-SCNC: 105 MMOL/L
CO2 SERPL-SCNC: 26 MMOL/L
CREAT SERPL-MCNC: 0.99 MG/DL
EGFR: 56 ML/MIN/1.73M2
EOSINOPHIL # BLD AUTO: 0.35 K/UL
EOSINOPHIL NFR BLD AUTO: 5 %
FERRITIN SERPL-MCNC: 36 NG/ML
GLUCOSE SERPL-MCNC: 142 MG/DL
HCT VFR BLD CALC: 41.5 %
HGB BLD-MCNC: 13.2 G/DL
IMM GRANULOCYTES NFR BLD AUTO: 0.3 %
IRON SATN MFR SERPL: 19 %
IRON SERPL-MCNC: 68 UG/DL
LYMPHOCYTES # BLD AUTO: 2.52 K/UL
LYMPHOCYTES NFR BLD AUTO: 36.3 %
MAN DIFF?: NORMAL
MCHC RBC-ENTMCNC: 31.1 PG
MCHC RBC-ENTMCNC: 31.8 GM/DL
MCV RBC AUTO: 97.9 FL
MONOCYTES # BLD AUTO: 0.48 K/UL
MONOCYTES NFR BLD AUTO: 6.9 %
NEUTROPHILS # BLD AUTO: 3.51 K/UL
NEUTROPHILS NFR BLD AUTO: 50.6 %
PLATELET # BLD AUTO: 297 K/UL
POTASSIUM SERPL-SCNC: 5 MMOL/L
PROT SERPL-MCNC: 6.3 G/DL
RBC # BLD: 4.24 M/UL
RBC # FLD: 13.2 %
SODIUM SERPL-SCNC: 143 MMOL/L
TIBC SERPL-MCNC: 361 UG/DL
TSH SERPL-ACNC: 0.6 UIU/ML
UIBC SERPL-MCNC: 293 UG/DL
WBC # FLD AUTO: 6.94 K/UL

## 2024-05-16 ENCOUNTER — NON-APPOINTMENT (OUTPATIENT)
Age: 84
End: 2024-05-16

## 2024-05-29 RX ORDER — OXYCODONE 5 MG/1
5 TABLET ORAL
Qty: 120 | Refills: 0 | Status: ACTIVE | COMMUNITY
Start: 2024-03-19 | End: 1900-01-01

## 2024-06-06 RX ORDER — GABAPENTIN 100 MG/1
100 CAPSULE ORAL DAILY
Qty: 60 | Refills: 2 | Status: ACTIVE | COMMUNITY
Start: 2023-11-09 | End: 1900-01-01

## 2024-06-06 RX ORDER — GABAPENTIN 300 MG/1
300 CAPSULE ORAL
Qty: 30 | Refills: 5 | Status: ACTIVE | COMMUNITY
Start: 1900-01-01 | End: 1900-01-01

## 2024-06-12 ENCOUNTER — APPOINTMENT (OUTPATIENT)
Dept: DERMATOLOGY | Facility: CLINIC | Age: 84
End: 2024-06-12
Payer: MEDICARE

## 2024-06-12 DIAGNOSIS — F45.8 OTHER SOMATOFORM DISORDERS: ICD-10-CM

## 2024-06-12 DIAGNOSIS — C44.529 SQUAMOUS CELL CARCINOMA OF SKIN OF OTHER PART OF TRUNK: ICD-10-CM

## 2024-06-12 PROCEDURE — 99213 OFFICE O/P EST LOW 20 MIN: CPT

## 2024-06-12 NOTE — PHYSICAL EXAM
[Alert] : alert [Oriented x 3] : ~L oriented x 3 [Well Nourished] : well nourished [Conjunctiva Non-injected] : conjunctiva non-injected [No Visual Lymphadenopathy] : no visual  lymphadenopathy [No Clubbing] : no clubbing [No Edema] : no edema [No Bromhidrosis] : no bromhidrosis [No Chromhidrosis] : no chromhidrosis [FreeTextEntry3] : Well-healed scar on the R central chest without nodularity Ecchymoses on the arms. No rash. Scars on the face and neck

## 2024-06-12 NOTE — HISTORY OF PRESENT ILLNESS
[FreeTextEntry1] : itching [de-identified] : 84F with a hx of NSCLC previously on pembro, now off all therapy due to ICI-induced hepatitis, also with a hx of SCC on the central chest s/p Mohs (3/2024) here for f/u. Notes ongoing itching on the face, neck, and arms. Most recent MRI of the spine with significant degenerative disease and spinal stenosis. On gabapentin 500mg QD.

## 2024-07-03 ENCOUNTER — APPOINTMENT (OUTPATIENT)
Dept: GERIATRICS | Facility: CLINIC | Age: 84
End: 2024-07-03
Payer: MEDICARE

## 2024-07-03 DIAGNOSIS — M54.50 LOW BACK PAIN, UNSPECIFIED: ICD-10-CM

## 2024-07-03 DIAGNOSIS — R47.01 APHASIA: ICD-10-CM

## 2024-07-03 DIAGNOSIS — M54.42 LUMBAGO WITH SCIATICA, LEFT SIDE: ICD-10-CM

## 2024-07-03 DIAGNOSIS — C34.12 MALIGNANT NEOPLASM OF UPPER LOBE, LEFT BRONCHUS OR LUNG: ICD-10-CM

## 2024-07-03 DIAGNOSIS — G89.29 OTHER CHRONIC PAIN: ICD-10-CM

## 2024-07-03 DIAGNOSIS — R53.83 OTHER FATIGUE: ICD-10-CM

## 2024-07-03 DIAGNOSIS — R63.0 ANOREXIA: ICD-10-CM

## 2024-07-03 DIAGNOSIS — K59.00 CONSTIPATION, UNSPECIFIED: ICD-10-CM

## 2024-07-03 DIAGNOSIS — Z51.5 ENCOUNTER FOR PALLIATIVE CARE: ICD-10-CM

## 2024-07-03 PROCEDURE — 99215 OFFICE O/P EST HI 40 MIN: CPT

## 2024-07-09 ENCOUNTER — APPOINTMENT (OUTPATIENT)
Dept: NEUROLOGY | Facility: CLINIC | Age: 84
End: 2024-07-09
Payer: MEDICARE

## 2024-07-09 ENCOUNTER — OUTPATIENT (OUTPATIENT)
Dept: OUTPATIENT SERVICES | Facility: HOSPITAL | Age: 84
LOS: 1 days | End: 2024-07-09
Payer: MEDICARE

## 2024-07-09 ENCOUNTER — APPOINTMENT (OUTPATIENT)
Dept: MRI IMAGING | Facility: IMAGING CENTER | Age: 84
End: 2024-07-09
Payer: MEDICARE

## 2024-07-09 VITALS
HEART RATE: 82 BPM | BODY MASS INDEX: 26.91 KG/M2 | OXYGEN SATURATION: 97 % | WEIGHT: 150 LBS | DIASTOLIC BLOOD PRESSURE: 85 MMHG | TEMPERATURE: 98.2 F | SYSTOLIC BLOOD PRESSURE: 120 MMHG | RESPIRATION RATE: 16 BRPM | HEIGHT: 62.5 IN

## 2024-07-09 DIAGNOSIS — C71.9 MALIGNANT NEOPLASM OF BRAIN, UNSPECIFIED: ICD-10-CM

## 2024-07-09 DIAGNOSIS — G40.802 OTHER EPILEPSY, NOT INTRACTABLE, W/OUT STATUS EPILEPTICUS: ICD-10-CM

## 2024-07-09 DIAGNOSIS — I25.10 ATHEROSCLEROTIC HEART DISEASE OF NATIVE CORONARY ARTERY WITHOUT ANGINA PECTORIS: Chronic | ICD-10-CM

## 2024-07-09 DIAGNOSIS — I73.9 PERIPHERAL VASCULAR DISEASE, UNSPECIFIED: Chronic | ICD-10-CM

## 2024-07-09 DIAGNOSIS — C79.31 SECONDARY MALIGNANT NEOPLASM OF BRAIN: ICD-10-CM

## 2024-07-09 PROCEDURE — 70553 MRI BRAIN STEM W/O & W/DYE: CPT | Mod: 26,MH

## 2024-07-09 PROCEDURE — 70553 MRI BRAIN STEM W/O & W/DYE: CPT

## 2024-07-09 PROCEDURE — A9585: CPT

## 2024-07-09 PROCEDURE — 99214 OFFICE O/P EST MOD 30 MIN: CPT

## 2024-07-09 NOTE — CHART NOTE - NSCHARTNOTEFT_GEN_A_CORE
Removal of Femoral Sheath    Pulses in the right lower extremity are palpable.   The patient was placed in the supine position. The insertion site was identified and the sutures were removed per protocol.    The __6__ Vincentian femoral sheath was then removed.   Direct pressure was applied for  ___20___ minutes.   Complications: None, VSS, Good Hemostasis.     Monitoring of the right groin and both lower extremities including neuro-vascular checks and vital signs every 15 minutes x 4, then every 30 minutes x 2, then every 1 hour was ordered.    Discharge Instruction discussed with patient: ASA, Plavix, statin, diet, activities, access site care, follow up care, reportable signs and symptoms.     A/P   HPI:  80f hx CAD/PAD last cardiac stent 1990's, recent RLE PAD stent on aspirin plavix, HTN, DM, presenting from PCPs office for new EKG changes. Pt states she had an episode of chest pressure on monday that self resolved after taking tums for presumed dyspepsia. No recurrent symptoms, no SOB, cough, no fevers/chills. No further abd symptoms/n/v/d. Went for routine check up today to Dr. Uribe's office, where she had EKG performed that prompted him to send her ED. Unclear what these new EKG changes were. At present, pt reports she is chest pain free, no acute symptoms. Of note, patient reports longstanding emotional stress from the loss of her  and two sons. ROS otherwise negative.    In ED: 98.9, 72, 173/82, 18, 100 RA. Seen by cardiology who recommended heparin gtt. (24 Mar 2021 17:53)    Pt s/p PCI/POBA to the pCX 90% via RFA with sheath removed with no complications    Plan: continue to monitor, Continue ASA, Plavix, Statin,   discharge home in am if stable or as per Primary service  Pt will follow up with his/her cardiologist in 1-2weeks with Brachytherapy to be scheduled in a few weeks.     Kayli Sidhu, [facility]  Cardiology No antibiotics or any antibiotics < 2 hrs prior to birth

## 2024-07-18 ENCOUNTER — OUTPATIENT (OUTPATIENT)
Dept: OUTPATIENT SERVICES | Facility: HOSPITAL | Age: 84
LOS: 1 days | End: 2024-07-18
Payer: MEDICARE

## 2024-07-18 ENCOUNTER — APPOINTMENT (OUTPATIENT)
Dept: NUCLEAR MEDICINE | Facility: IMAGING CENTER | Age: 84
End: 2024-07-18

## 2024-07-18 DIAGNOSIS — C34.12 MALIGNANT NEOPLASM OF UPPER LOBE, LEFT BRONCHUS OR LUNG: ICD-10-CM

## 2024-07-18 DIAGNOSIS — I25.10 ATHEROSCLEROTIC HEART DISEASE OF NATIVE CORONARY ARTERY WITHOUT ANGINA PECTORIS: Chronic | ICD-10-CM

## 2024-07-18 PROCEDURE — 78815 PET IMAGE W/CT SKULL-THIGH: CPT | Mod: 26,PS,KX,MH

## 2024-07-18 PROCEDURE — 78815 PET IMAGE W/CT SKULL-THIGH: CPT

## 2024-07-18 PROCEDURE — A9552: CPT

## 2024-07-26 ENCOUNTER — OUTPATIENT (OUTPATIENT)
Dept: OUTPATIENT SERVICES | Facility: HOSPITAL | Age: 84
LOS: 1 days | Discharge: HOME | End: 2024-07-26

## 2024-07-26 DIAGNOSIS — I25.10 ATHEROSCLEROTIC HEART DISEASE OF NATIVE CORONARY ARTERY WITHOUT ANGINA PECTORIS: Chronic | ICD-10-CM

## 2024-07-26 DIAGNOSIS — C34.90 MALIGNANT NEOPLASM OF UNSPECIFIED PART OF UNSPECIFIED BRONCHUS OR LUNG: ICD-10-CM

## 2024-07-26 DIAGNOSIS — I73.9 PERIPHERAL VASCULAR DISEASE, UNSPECIFIED: Chronic | ICD-10-CM

## 2024-08-02 ENCOUNTER — APPOINTMENT (OUTPATIENT)
Dept: HEMATOLOGY ONCOLOGY | Facility: CLINIC | Age: 84
End: 2024-08-02
Payer: MEDICARE

## 2024-08-02 DIAGNOSIS — D50.9 IRON DEFICIENCY ANEMIA, UNSPECIFIED: ICD-10-CM

## 2024-08-02 DIAGNOSIS — C34.12 MALIGNANT NEOPLASM OF UPPER LOBE, LEFT BRONCHUS OR LUNG: ICD-10-CM

## 2024-08-02 DIAGNOSIS — I26.99 OTHER PULMONARY EMBOLISM W/OUT ACUTE COR PULMONALE: ICD-10-CM

## 2024-08-02 DIAGNOSIS — C79.31 SECONDARY MALIGNANT NEOPLASM OF BRAIN: ICD-10-CM

## 2024-08-02 PROCEDURE — 99213 OFFICE O/P EST LOW 20 MIN: CPT

## 2024-08-02 PROCEDURE — G2211 COMPLEX E/M VISIT ADD ON: CPT

## 2024-08-07 NOTE — REASON FOR VISIT
[Home] : at home, [unfilled] , at the time of the visit. [Medical Office: ([facility])___] : at the medical office located in  [Patient] : the patient [Follow-Up] : a follow-up visit [Other: _____] : [unfilled]

## 2024-08-08 ENCOUNTER — APPOINTMENT (OUTPATIENT)
Dept: GERIATRICS | Facility: CLINIC | Age: 84
End: 2024-08-08

## 2024-08-08 PROCEDURE — 99215 OFFICE O/P EST HI 40 MIN: CPT

## 2024-08-08 NOTE — ASSESSMENT
[FreeTextEntry1] : Metastatic NSCLC with adenocarcinoma histology; tumor tested PDL1 Negative and lacks actionable mutations but tested TMB-High. Treated with GK-SRS for brain metastases in Sept 2022. Started first line single agent Pembrolizumab in Oct 2022; achieved [place]. 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  She is s/p hospitalization Dec 2023 and started on steroids with normalization of liver enzymes.  Completed prednisone taper and monitored off systemic therapy.   Restaging brain MRI July 2024 with sustained intracranial response. Restaging PET/CT July 2024 with sustained systemic response. 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  She completed Prednisone taper.  Follow-up with hepatology.   -Back pain: Pt with h/o spinal stenosis and longstanding chronic back pain that predates her diagnosis. Follows with outside pain management Dr. Ryder Salazar ([phone]). Follows with [facility]. Takes gabapentin, oxycodone and medical cannabis.  Has not seen PMR.   - Pruritus: Consider dose adjustment of gabapentin as recommended by dermatology.  Patient to discuss with palliative care. - Brain Metastases: F/U with Radiation oncology s/p GK-SRS to brain metastases. Follows with Neuro-Onc as well. On Keppra.  MRI Brain from July 2024 with sustained intracranial response.  A 4 month brain MRI is planned (November).    - PE: Continue Eliquis anticoagulation indefinitely given PE and Afib. - Continue follow-up and management with her PCP/cardiologist for her other ongoing medical problems and comorbidities. -Iron-deficiency anemia:  s/p PO iron repletion.  Monitor hemoglobin and need for further iron repletion.   - - Recommend monitoring off systemic therapy until time of disease progression.  Obtain restaging PET/CT in late November after Thanksgiving and follow-up to review results.

## 2024-08-08 NOTE — HISTORY OF PRESENT ILLNESS
[Disease: _____________________] : Disease: [unfilled] [AJCC Stage: ____] : AJCC Stage: [unfilled] [Home] : at home, [unfilled] , at the time of the visit. [Medical Office: ([facility])___] : at the medical office located in  [Formal Caregiver] : formal caregiver [Verbal consent obtained from patient] : the patient, [unfilled] [FreeTextEntry4] : Elijah Moeller  [de-identified] : Patient is a former smoker with multiple comorbidities was undergoing orthopedic evaluation as an outpatient due to ongoing chronic back pain and gait imbalance.  She ultimately presented to [facility] ED on 7/19/2022.  Brain imaging demonstrated evidence of metastatic disease including a dominant right temporal lobe 3.1 cm enhancing mass with vasogenic edema.  CT scan of her body demonstrated a ANTHONY tumor with subcentimeter pulmonary nodules and a nonspecific adrenal nodule.  Bronchoscopy with biopsy performed on 7/20/2022 demonstrated adenocarcinoma consistent with lung primary; tumor tested PD-L1 negative and was negative for actionable mutations. however, tested TMB-High.  She underwent craniotomy resection of the dominant brain mass on 7/27/2022 with pathology revealing the same findings.  Hospital course was complicated by NSTEMI, Afib with RVR and RML PE.  She was put on medications for seizure prophylaxis.  She was subsequently discharged to acute rehab on 8/10-8/26/2022.  Treated with GK-SRS to brain metastases in Sept 2022. Started 1st line single agent Pembro in Oct 2022; achieved NM.  Patient was treated through early Nov 2023 when the immunotherapy was held after development of transaminitis and hyperbilirubinemia concering for ICI-induced hepatitis.  Patient is status post hospital admission 12/9 - 12/18/2023 after presenting with elevated liver enzymes, originally noted by endocrinologist, which were worsening.  Abdominal ultrasound revealed a left renal cyst and otherwise was unremarkable.  Hepatology was consulted.  Acute hepatitis panel was negative.  CT abdomen and MRI abdomen were uninformative with regards to etiology of transaminitis.  Patient was started on steroids with prednisone 40 mg daily with improvement in the LFTs.  Liver biopsy was avoided.  She was treated for UTI during her hospital stay.  DM was medically managed given the addition of the steroids.  Monitored off systemic therapy after last dose of Pembro in Nov 2023.   [de-identified] : - Left mainstem bronchus biopsy 7/20/2022: Poorly differentiated adenocarcinoma of lung origin.\par  PD-L1 negative/0%.\par  Foundation One:  No actionable mutations (TP53+, among others); TMB-High.  \par  -Right brain tumor resection 7/27/2022: Metastatic non-small cell carcinoma, favor adenocarcinoma, consistent with lung origin.\par  PD-L1 negative. [de-identified] : Patient started first-line single agent Pembrolizumab in Oct 2022; achieved [place].  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  Treated with steroids which she responded to.  Monitored off systemic therapy since that time.      Today's visit is via telehealth; her aide is present.   Patient has ongoing significant LBP.  She has been following with palliative care.   She is utilizing medical cannabis, oxycodone and gabapentin.  She reports that she takes oxycodone 7.5 mg once daily which effectively controls her back pain. She has ongoing pruritus symptoms.  Dermatology suggested adjusting the dose of gabapentin; recommended she discuss this with palliative care.  Brain MRI from 7/9/2024 with sustained intracranial response. PET/CT from 7/18/2024 with sustained response off systemic therapy.

## 2024-08-08 NOTE — ASSESSMENT
[______] : HCP: [unfilled] [FreeTextEntry1] : 84yoF with:  # Stage IV Lung Adenocarcinoma - Keytruda (on hold since 11/2023); s/p gamma knife surgery. Med Onc follow up.  # Chronic lower back pain/hip pain - Patient has a multitude of lumbar spine issues as indicated on MRI LS Spine (7/2022).  Assessment is limited today given the visit is done virtually.  - C/w Tylenol 500-1000mg PRN. - C/W gabapentin to 200mg QAM and 300mg QHS. Will continue to increase gabapentin to therapeutic dosing as tolerated. -Increase Oxycodone dose to 7.5mg Q4h PRN, log usage.  Counseled on importance of maintaining bowel regularity in light of regular opioid use. - Previously provided referral information for the spine center for possible rehab and alternative approaches to her back pain.    # Decreased appetite - c/w medicinal cannabis 1:1  # Constipation - Recommend use of Miralax daily.  # Adjustment d/o - Empathetic listening and validation of patient's emotions provided.  # Encounter for palliative care- Health Care Proxy (HCP) form on file.  Follow up in 4-6 weeks, call with questions or issues.

## 2024-08-08 NOTE — HISTORY OF PRESENT ILLNESS
[Disease: _____________________] : Disease: [unfilled] [AJCC Stage: ____] : AJCC Stage: [unfilled] [Home] : at home, [unfilled] , at the time of the visit. [Medical Office: ([facility])___] : at the medical office located in  [Formal Caregiver] : formal caregiver [Verbal consent obtained from patient] : the patient, [unfilled] [FreeTextEntry4] : Elijah Moeller  [de-identified] : Patient is a former smoker with multiple comorbidities was undergoing orthopedic evaluation as an outpatient due to ongoing chronic back pain and gait imbalance.  She ultimately presented to [facility] ED on 7/19/2022.  Brain imaging demonstrated evidence of metastatic disease including a dominant right temporal lobe 3.1 cm enhancing mass with vasogenic edema.  CT scan of her body demonstrated a ANTHONY tumor with subcentimeter pulmonary nodules and a nonspecific adrenal nodule.  Bronchoscopy with biopsy performed on 7/20/2022 demonstrated adenocarcinoma consistent with lung primary; tumor tested PD-L1 negative and was negative for actionable mutations. however, tested TMB-High.  She underwent craniotomy resection of the dominant brain mass on 7/27/2022 with pathology revealing the same findings.  Hospital course was complicated by NSTEMI, Afib with RVR and RML PE.  She was put on medications for seizure prophylaxis.  She was subsequently discharged to acute rehab on 8/10-8/26/2022.  Treated with GK-SRS to brain metastases in Sept 2022. Started 1st line single agent Pembro in Oct 2022; achieved MT.  Patient was treated through early Nov 2023 when the immunotherapy was held after development of transaminitis and hyperbilirubinemia concering for ICI-induced hepatitis.  Patient is status post hospital admission 12/9 - 12/18/2023 after presenting with elevated liver enzymes, originally noted by endocrinologist, which were worsening.  Abdominal ultrasound revealed a left renal cyst and otherwise was unremarkable.  Hepatology was consulted.  Acute hepatitis panel was negative.  CT abdomen and MRI abdomen were uninformative with regards to etiology of transaminitis.  Patient was started on steroids with prednisone 40 mg daily with improvement in the LFTs.  Liver biopsy was avoided.  She was treated for UTI during her hospital stay.  DM was medically managed given the addition of the steroids.  Monitored off systemic therapy after last dose of Pembro in Nov 2023.   [de-identified] : - Left mainstem bronchus biopsy 7/20/2022: Poorly differentiated adenocarcinoma of lung origin.\par  PD-L1 negative/0%.\par  Foundation One:  No actionable mutations (TP53+, among others); TMB-High.  \par  -Right brain tumor resection 7/27/2022: Metastatic non-small cell carcinoma, favor adenocarcinoma, consistent with lung origin.\par  PD-L1 negative. [de-identified] : Patient started first-line single agent Pembrolizumab in Oct 2022; achieved MA.  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  Treated with steroids which she responded to.  Monitored off systemic therapy since that time.      Today's visit is via telehealth; her aide is present.   Patient has ongoing significant LBP.  She has been following with palliative care.   She is utilizing medical cannabis, oxycodone and gabapentin.  She reports that she takes oxycodone 7.5 mg once daily which effectively controls her back pain. She has ongoing pruritus symptoms.  Dermatology suggested adjusting the dose of gabapentin; recommended she discuss this with palliative care.  Brain MRI from 7/9/2024 with sustained intracranial response. PET/CT from 7/18/2024 with sustained response off systemic therapy.

## 2024-08-08 NOTE — DATA REVIEWED
[FreeTextEntry1] : PET Skull to Thigh (7/18/2024) FINDINGS:  HEAD/NECK: Incompletely imaged right temporal craniotomy and photopenic hypodense area in right temporal lobe, unchanged, compatible with postsurgical encephalomalacia and gliosis, unchanged, and better evaluated on MRI. Physiologic FDG activity in remainder of visualized head and neck.  CHEST: No enlarged or FDG-avid mediastinal or hilar lymph node.  LUNGS: Interval resolution of FDG avid groundglass opacities evident on prior exam 1/25/2024.  Minimally FDG-avid linear left upper lobe opacity abutting the mediastinum is not significantly changed (image 77). Similar minimally FDG avid linear opacity at the level of the AP window (image 95).  PLEURA/PERICARDIUM: No abnormal FDG activity. No effusion.  HEPATOBILIARY/PANCREAS: Physiologic FDG activity. Liver SUV mean is 3.9; previously 3.1.  SPLEEN: Physiologic FDG activity. Normal in size.  ADRENAL GLANDS: No abnormal FDG activity. Similar non-FDG avid 0.9 cm left adrenal nodule.  KIDNEYS/URINARY BLADDER: Physiologic excreted FDG activity. Left renal cyst.  REPRODUCTIVE ORGANS: No abnormal FDG activity.  ABDOMINOPELVIC NODES: No enlarged or FDG-avid lymph node.  BOWEL/PERITONEUM/MESENTERY: Nonspecific diffuse distal small bowel and pancolonic hypermetabolism, not significantly changed, and without corresponding abnormalities on CT, likely is related to metformin.  VESSELS: Coronary artery calcifications and/or stent. Atherosclerotic changes in the aorta and branches. No aneurysm.  BONES/SOFT TISSUES: Interval resolution of FDG avid cutaneous nodule on the anterior chest just right of midline; previously measuring 0.9 x 0.4 cm, SUV 3.  Nonspecific, mild FDG activity adjacent to right shoulder prosthesis, and FDG-avid degenerative change, left glenohumeral joint and lumbar spine, unchanged.  Nonspecific, mildly FDG-avid skin thickening and subcutaneous stranding, bilateral mid/lower anterior abdominal wall, similar to prior.  IMPRESSION:  1. Compared to PET/CT January 25, 2024, there has been interval resolution of FDG avid groundglass opacities in the lungs.  2. Resolution of FDG avid skin nodule in the anterior chest.  3. No FDG avid disease.  --- End of Report ---

## 2024-08-08 NOTE — PHYSICAL EXAM
[Ambulatory and capable of all self care but unable to carry out any work activities] : Status 2- Ambulatory and capable of all self care but unable to carry out any work activities. Up and about more than 50% of waking hours [Normal] : affect appropriate [de-identified] : NAD [de-identified] : No icterus  [de-identified] : PAU [de-identified] : No LAD  [de-identified] : Not SOB [de-identified] : No edema

## 2024-08-08 NOTE — ASSESSMENT
[FreeTextEntry1] : Metastatic NSCLC with adenocarcinoma histology; tumor tested PDL1 Negative and lacks actionable mutations but tested TMB-High. Treated with GK-SRS for brain metastases in Sept 2022. Started first line single agent Pembrolizumab in Oct 2022; achieved [place]. 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  She is s/p hospitalization Dec 2023 and started on steroids with normalization of liver enzymes.  Completed prednisone taper and monitored off systemic therapy.   Restaging brain MRI July 2024 with sustained intracranial response. Restaging PET/CT July 2024 with sustained systemic response. 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  She completed Prednisone taper.  Follow-up with hepatology.   -Back pain: Pt with h/o spinal stenosis and longstanding chronic back pain that predates her diagnosis. Follows with outside pain management Dr. Ryder Salazar ([phone]). Follows with [facility]. Takes gabapentin, oxycodone and medical cannabis.  Has not seen PMR.   - Pruritus: Consider dose adjustment of gabapentin as recommended by dermatology.  Patient to discuss with palliative care. - Brain Metastases: F/U with Radiation oncology s/p GK-SRS to brain metastases. Follows with Neuro-Onc as well. On Keppra.  MRI Brain from July 2024 with sustained intracranial response.  A 4 month brain MRI is planned (November).    - PE: Continue Eliquis anticoagulation indefinitely given PE and Afib. - Continue follow-up and management with her PCP/cardiologist for her other ongoing medical problems and comorbidities. -Iron-deficiency anemia:  s/p PO iron repletion.  Monitor hemoglobin and need for further iron repletion.   - - Recommend monitoring off systemic therapy until time of disease progression.  Obtain restaging PET/CT in late November after Thanksgiving and follow-up to review results.

## 2024-08-08 NOTE — HISTORY OF PRESENT ILLNESS
[FreeTextEntry1] : 84yoF with stage IV NSCLC presents for follow-up palliative care visit.  PMH significant for NSTEMI, DM2, Afib with RVR and RML PE, chronic low back pain, former smoker.   Patient reports that she was suffering with chronic low back pain and progressive gait instability, was evaluated by orthopedist in 2022. She underwent imaging of the spinerain imaging demonstrated evidence of metastatic disease including a dominant right temporal lobe 3.1 cm enhancing mass with vasogenic edema. CT scan of her body demonstrated a ANTHONY tumor with subcentimeter pulmonary nodules and a nonspecific adrenal nodule. Bronchoscopy with biopsy performed on 2022 demonstrated adenocarcinoma consistent with lung primary; tumor tested PD-L1 negative and was negative for actionable mutations. She underwent craniotomy resection of the dominant brain mass on 2022 with pathology revealing the same findings. Hospital course was complicated by NSTEMI, A. fib with RVR and RML PE. She was put on medications for seizure prophylaxis. She was put on anticoagulation and currently remains on prophylactic dosing of anticoagulation with enoxaparin 40 mg subcu daily, pending clearance for therapeutic anticoagulation by neurosurgery. She was subsequently discharged to acute rehab on 8/. Has initiated home PT services.   Pt met with Dr. Funes yesterday for evaluation for RT.  Patient presents to establish with palliative care early on.  She reports fatigue and weakness as her major issues lately.   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 She remains on a prednisone taper, currently 5mg, for autoimmune hepatitis. She follows with hepatology and her liver enzymes have now normalized.  Appetite is good. Constipated, moves her bowels every two to three days with Miralax as-needed. Mood fluctuates, she is frustrated with her loss of independence and ongoing pain.   Interval history (24): Patient seen via telemedicine for palliative medicine follow up, accompanied by her HHA, Myla.  Main reason for visit today is to discuss acute ongoing chronic pain.  Pt describes worsening of pain in her left low back, by the iliac crest. This pain is different for her as she typically has suffered with pain in her mid-back. The pain is not much of an issue when she lays down flat. She utilizes oxycodone 7.5mg daily, taken at 3am when she wakes to use the bathroom. She finds that dosing at this time allows her to awaken with less pain and is able to go about her day more comfortably. Encouraged to supplement with Tylenol 1000mg q 8 for mild pain. Remains on gabapentin 200mg QAM and 300mg QHS. Reports decreased appetite, prefers fresh fruits which increases her blood sugars. Denies N/V. Continues with constipation, bowel regimen in place.  She uses medical cannabis 1:1 tincture QHS for help with appetite, sleep. Has not benefited this acute pain. Patient reports experiencing absence- like seizure a month ago. She has followed up with neurology and had no further episodes.   Of note pt has a long history of spine pain related to spinal stenosis, this has been managed in the past with epidural injections and a pain regimen with Dr. Salazar. Her most recent epidural injection provided relief for ~4 days, she does not wish to try any further interventional procedures. She has stopped following with Dr. Salazar due to her limited ability to attend multiple physician appointments, and instead has been following closely with palliative care while at [facility]. She has significant weakness in her legs due to the pain and feels off balance. She ambulates with a walker and unable to stand straight.    ROS: + mood labile + urticaria  + has been losing her hair (this precedes her cancer diagnosis) + appetite is low; improved + constipation, uses PRN Miralax + sleep disturbances, finds medical cannabis helpful + MOHS for SCC of chest wall Denies n/v Remainder of ROS non-contributory  Patient is , lives alone. She has one son who lives nearby (two other sons are ). She has a 24/7 HHA since being home, is requiring assistance with her ADLs. She has a shower chair, grab bars. Has not had any falls since being home. Prior to the surgery she was independent, driving.   PMD/Cardiologist: Dr. Kimani Uribe  Endocrinologist: Dr. Chaz Richardson  I-STOP Ref#: [id number]

## 2024-08-08 NOTE — HISTORY OF PRESENT ILLNESS
[FreeTextEntry1] : 84yoF with stage IV NSCLC presents for follow-up palliative care visit.  PMH significant for NSTEMI, DM2, Afib with RVR and RML PE, chronic low back pain, former smoker.   Patient reports that she was suffering with chronic low back pain and progressive gait instability, was evaluated by orthopedist in 2022. She underwent imaging of the spinerain imaging demonstrated evidence of metastatic disease including a dominant right temporal lobe 3.1 cm enhancing mass with vasogenic edema. CT scan of her body demonstrated a ANTHONY tumor with subcentimeter pulmonary nodules and a nonspecific adrenal nodule. Bronchoscopy with biopsy performed on 2022 demonstrated adenocarcinoma consistent with lung primary; tumor tested PD-L1 negative and was negative for actionable mutations. She underwent craniotomy resection of the dominant brain mass on 2022 with pathology revealing the same findings. Hospital course was complicated by NSTEMI, A. fib with RVR and RML PE. She was put on medications for seizure prophylaxis. She was put on anticoagulation and currently remains on prophylactic dosing of anticoagulation with enoxaparin 40 mg subcu daily, pending clearance for therapeutic anticoagulation by neurosurgery. She was subsequently discharged to acute rehab on 8/. Has initiated home PT services.   Pt met with Dr. Funes yesterday for evaluation for RT.  Patient presents to establish with palliative care early on.  She reports fatigue and weakness as her major issues lately.   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 She remains on a prednisone taper, currently 5mg, for autoimmune hepatitis. She follows with hepatology and her liver enzymes have now normalized.  Appetite is good. Constipated, moves her bowels every two to three days with Miralax as-needed. Mood fluctuates, she is frustrated with her loss of independence and ongoing pain.   Interval history (24): Patient seen via telemedicine for palliative medicine follow up, accompanied by her HHA, Myla.  Main reason for visit today is to discuss acute ongoing chronic pain.  Pt describes worsening of pain in her left low back, by the iliac crest. This pain is different for her as she typically has suffered with pain in her mid-back. The pain is not much of an issue when she lays down flat. She utilizes oxycodone 7.5mg daily, taken at 3am when she wakes to use the bathroom. She finds that dosing at this time allows her to awaken with less pain and is able to go about her day more comfortably. Encouraged to supplement with Tylenol 1000mg q 8 for mild pain. Remains on gabapentin 200mg QAM and 300mg QHS. Reports decreased appetite, prefers fresh fruits which increases her blood sugars. Denies N/V. Continues with constipation, bowel regimen in place.  She uses medical cannabis 1:1 tincture QHS for help with appetite, sleep. Has not benefited this acute pain. Patient reports experiencing absence- like seizure a month ago. She has followed up with neurology and had no further episodes.   Of note pt has a long history of spine pain related to spinal stenosis, this has been managed in the past with epidural injections and a pain regimen with Dr. Salazar. Her most recent epidural injection provided relief for ~4 days, she does not wish to try any further interventional procedures. She has stopped following with Dr. Salazar due to her limited ability to attend multiple physician appointments, and instead has been following closely with palliative care while at [facility]. She has significant weakness in her legs due to the pain and feels off balance. She ambulates with a walker and unable to stand straight.    ROS: + mood labile + urticaria  + has been losing her hair (this precedes her cancer diagnosis) + appetite is low; improved + constipation, uses PRN Miralax + sleep disturbances, finds medical cannabis helpful + MOHS for SCC of chest wall Denies n/v Remainder of ROS non-contributory  Patient is , lives alone. She has one son who lives nearby (two other sons are ). She has a 24/7 HHA since being home, is requiring assistance with her ADLs. She has a shower chair, grab bars. Has not had any falls since being home. Prior to the surgery she was independent, driving.   PMD/Cardiologist: Dr. Kimani Uribe  Endocrinologist: Dr. Chaz Richardson  I-STOP Ref#: [id number]

## 2024-08-08 NOTE — RESULTS/DATA
[FreeTextEntry1] : -CT Brain 7/18/22:  Cystic versus a necrotic masses measure approximately 4.8 x 3.2 x 3.2 cm in the right temporal lobe and 1.4 x 1.3 x 1.6 cm in the medial left frontal lobe.  Moderate to large amount of vasogenic edema in the right frontal, parietal and temporal lobes. Trace edema in the left frontal lobe.  Regional mass effect in the right cerebral hemisphere with partial effacement of the ventricular system and 5 mm subfalcine herniation of the right frontal lobe underneath the cerebral falx.  The findings may represent intracranial metastatic disease versus glioblastoma. Contrast-enhanced brain MRI is recommended for further evaluation.  -CT C/A/P 7/18/22:  Left upper lobe lung mass with partial atelectasis of the left upper lobe, concerning for primary lung neoplasm. Few scattered sub-4 mm pulmonary nodules, indeterminate.  Indeterminant left adrenal nodule for which contrast enhanced MRI is recommended for further evaluation.  -MRI Brain 7/20/22:  Right temporal lobe peripherally enhancing, centrally necrotic 3.1 x 2.6 cm mass demonstrating mild diffusion restriction. There is surrounding vasogenic edema. 8 mm leftward midline shift. Right uncal herniation. Additional left parafalcine 1.3 x 0.9 cm rim-enhancing cystic lesion with mild surrounding vasogenic edema. Tiny 0.3 x 0.3 cm rim-enhancing lesion within the left frontal cortex. Findings are concerning for metastatic disease in the context of patient's known primary lung cancer. The left lateral ventricle is mildly dilated.  -LE Dopplers 7/24/22:  No evidence of deep venous thrombosis in either lower extremity. -LE Dopplers 7/28/22:  No evidence of deep venous thrombosis in either lower extremity.  -CT Chest Angio 8/3/22:  Pulmonary embolism in the lateral segmental branch of the right middle lobe. No evidence of right heart strain.  Interval decrease in left upper lobe mass.  Unchanged obstruction of the posterior bronchus, and stable fibroglandular lymph node.  -Abdominal U/S 8/4/22:   Mildly distended gallbladder. No evidence of acute cholecystitis.  Possible mild hydronephrosis.  -LE Dopplers 8/7/22:  No evidence of deep venous thrombosis in either lower extremity.  -LE Dopplers 9/1/22:  No evidence of deep venous thrombosis in either lower extremity.  -PET/CT 9/9/22:  Abnormal FDG-PET/CT scan. 1. FDG-avid left upper lobe lung mass abutting the mediastinum at level of AP window, with associated atelectasis along the mediastinum, unchanged as compared to CT dated 8/3/2022, corresponds to patient's known adenocarcinoma. 2. Right temporal craniotomy with photopenic low attenuation within right temporal lobe, compatible with vasogenic edema, gliosis, and/or postsurgical changes, better evaluated on contrast-enhanced CT or MRI. 3. Findings suggestive of left vocal cord paralysis, likely secondary to left upper lobe lung mass. Please correlate clinically and with direct visualization, as indicated. 4. Nonspecific hypermetabolism in small bowel and colon likely is related to metformin. 5. No scan evidence of metastatic disease.  -PET/CT 12/8/22:  1. FDG-avid left upper lobe lung nodule is decreased in size and metabolism, compatible with a partial response to interval therapy. Interval decrease in adjacent left upper lobe paramediastinal opacity, likely atelectasis, which is unchanged in metabolism. 2. Right temporal craniotomy with photopenic low attenuation within right temporal lobe, compatible with vasogenic edema, gliosis, and/or postsurgical changes, better evaluated on contrast-enhanced CT or MRI, not significantly changed. 3. Findings suggestive of left vocal cord paralysis, likely secondary to left upper lobe lung mass, unchanged. 4. Nonspecific hypermetabolism in small bowel and colon, unchanged, likely related to metformin. 5. No scan evidence of metastatic disease.  -MRI 12/8/22:  Interval decrease in size and peripheral enhancement of the medial left frontal lesion. Smaller ring-enhancing lesion more laterally along the left frontal cortex no longer visualized.  No new lesions seen.  Postsurgical cavity right temporal lobe with mild adjacent enhancement. Continued follow-up recommended.  -MRI Brain 2/23/23:  Stable medial left frontal lobe nonenhancing cystic lesion. Stable linear enhancement medial to the right temporal lobe surgical cavity. No new areas abnormal enhancement. Continued follow-up is recommended  -PET/CT 3/2/23:  Compared to FDG PET/CT dated 12/8/2022: 1. FDG-avid left upper lobe lung nodule is not significantly changed in size or metabolism, compatible with stable disease. Interval decrease in size and metabolism of adjacent left upper lobe opacity abutting the mediastinum. 2. Nonspecific FDG-avid skin thickening, bilateral mid/lower anterior abdominal wall with new associated subcutaneous stranding, and increased in metabolism, likely secondary to inflammation. Please correlate clinically. 3. Right temporal craniotomy with photopenic low attenuation within right temporal lobe, compatible with vasogenic edema, gliosis, and/or postsurgical changes, better evaluated on contrast-enhanced CT or MRI, not significantly changed. 4. Nonspecific pancolonic hypermetabolism, not significantly changed, likely is related to metformin. 5. No scan evidence of metastatic disease.  -MRI Brain 6/15/23:  Stable right temporal postoperative changes. Stable subcentimeter focus of enhancement along the medial aspect of the cavity, likely posttreatment changes. No new or worsening enhancement identified.  -PET/CT 6/22/23:  Compared to FDG PET/CT dated 3/2/2023: 1. Mildly FDG-avid left upper lobe lung nodule is not significantly changed in size or metabolism, compatible with stable disease. A minimally FDG-avid adjacent left upper lobe opacity abutting the mediastinum is decreased in size and unchanged and avidity. 2. Right temporal craniotomy with photopenic low attenuation within right temporal lobe, compatible with posttreatment changes, unchanged. 3. Nonspecific diffuse distal small bowel hypermetabolism, increased as compared to prior study, and pancolonic hypermetabolism, decreased as compared to prior study, without corresponding abnormalities on CT, likely are related to metformin. 4. Nonspecific mildly FDG-avid skin thickening and subcutaneous stranding, bilateral mid/lower anterior abdominal wall, decreased as compared to prior study. Please correlate clinically. 5. Remainder of study demonstrates no evidence of FDG-avid disease.  -MRI Brain 7/16/23: No significant interval change from 6/15/2023.  Similar-appearing postsurgical encephalomalacia and gliosis in the right mid temporal lobe. Similar 5 mm focus of enhancement along the mesial aspect of the surgical cavity, likely postsurgical in nature. No new abnormal parenchymal or leptomeningeal enhancement. Similar nonspecific T2 and FLAIR hyperintense signal surrounding the surgical cavity, likely representing gliosis.  -CT PERFUSION 7/16/23: Perfusion abnormality localized to hypodense region in the temporal lobe, related to postoperative changes rather than true perfusion defect. No evidence of core infarct or ischemic penumbra otherwise.  -CT ANGIOGRAPHY NECK 7/16/23: Patent vasculature without occlusion. Changes of prior right carotid endarterectomy. Marked narrowing and luminal irregularity of the proximal right internal carotid artery just beyond the origin with limited assessment of stenosis by NASCET criteria given the overall diminutive caliber to the cervical right internal carotid artery. Moderate stenosis origin left internal carotid artery by NASCET criteria. No critical stenosis or dissection.  -CT ANGIOGRAPHY BRAIN 7/16/23: No vessel occlusion, flow-limiting stenosis or aneurysm. NASCET CAROTID STENOSIS CRITERIA (distal normal appearing ICA as denominator for measurement): 0%-none, 1-49%-mild, 50-70%-moderate, 70-89%-severe, 90-99%-critical.  -MRI Brain 7/17/23:  No significant interval change from 6/15/2023.  Similar-appearing postsurgical encephalomalacia and gliosis in the right mid temporal lobe.  Similar 5 mm focus of enhancement along the mesial aspect of the surgical cavity, likely postsurgical in nature.  No new abnormal parenchymal or leptomeningeal enhancement.  Similar nonspecific T2 and FLAIR hyperintense signal surrounding the surgical cavity, likely representing gliosis.  -PET/CT 10/5/23:  Compared to FDG PET/CT dated 6/22/2023: 1. Small difficult to delineate FDG-avid focus in left upper lobe paramediastinal region is further decreased in size and metabolism. Small focus of residual disease is not excluded. Minimally FDG-avid linear left upper lobe opacity abutting the mediastinum, not significantly changed, may represent a combination of atelectasis and treated disease. 2. Nonspecific diffuse distal small bowel and pancolonic hypermetabolism, not significantly changed, likely is related to metformin. 3. Nonspecific mildly FDG-avid skin thickening and subcutaneous stranding, bilateral mid/lower anterior abdominal wall, decreased in thickness and unchanged in metabolism. Please correlate clinically. 4. Remainder of study demonstrates no evidence of FDG-avid disease. No new lesion.  -MRI Brain 11/2/23: stable exam compared to 7/17/2023  -US Abdomen 11/16/23:  Left renal cyst. Otherwise, unremarkable abdominal sonogram.  -CT Abdomen/Pelvis 12/9/23:  MIld fat stranding surrounding the 1st and 2nd portions of the duodenum of uncertain etiology, however raising the possibility of duodenal inflammation.  Periportal lymph node, new since July 18, 2022.  -MR Abdomen 12/11/23:   * Mild periportal edema. Otherwise unremarkable appearance of the liver. * No biliary duct dilation. * Mild nonspecific edematous gallbladder wall thickening, potentially reactive to the liver. Consider correlation with right upper quadrant ultrasound as warranted. * Nonspecific periportal lymphadenopathy.  -MBS 12/15/23:  There was no evidence of penetration or aspiration with the tested consistencies.  -CT Head 12/17/23:  Stable exam. No acute intracranial hemorrhage, mass effect, or midline shift.  Stable postsurgical changes in the right temporal bone, with subadjacent encephalomalacia and gliosis.  -PET/CT 1/25/24:   1. Compared to PET/CT 10/5/2023, there has been interval development of FDG avid groundglass opacities in the left upper lobe, one measuring 1.1 x 0.8 cm, SUV 2.6, and one linear opacity measuring 1.4 x 0.5 cm, SUV 3. Recurrent disease is a consideration, as well as inflammatory/infectious changes. Note that there are additional groundglass opacities in the lungs which have increased since prior exam, which are non-FDG avid. Consider clinical correlation for signs and symptoms of infection and interval short-term follow-up PET/CT for further evaluation. 2. Compared with PET/CT 10/5/2023, there has been interval development of an FDG avid skin nodule in the anterior chest just right of midline. Recommend clinical examination for signs of neoplasm or infection.  Images Reviewed/Interpreted:  - MRI Brain 3/7/24:  Redemonstration of right temporal surgical cavity with similar nonspecific surrounding T2 and FLAIR hyperintense signal which likely represents gliosis/postsurgical changes. Similar ex vacuo dilatation of the adjacent right temporal horn. Slightly increased size of curvilinear focus of enhancement along the superomedial aspect of the right temporal surgical cavity, currently 5 x 2 mm, previously 4 x 2 mm. No new abnormal parenchymal enhancement. No abnormal leptomeningeal enhancement. MR perfusion demonstrates decreased relative cerebral blood flow and relative cerebral blood volume in the region of the right temporal surgical cavity and surrounding T2 and FLAIR hyperintense signal, suggesting the presence of posttreatment/postsurgical changes. Tiny enhancing focus along the superomedial aspect of the surgical cavity is poorly evaluated due to its small size.  -CT Chest 4/25/24:  No interval change in a linear opacity in the paramediastinal left upper lobe corresponding to patient's known lung cancer. The previously seen groundglass opacities in the left upper lobe has resolved.  -MRI L-Spine 5/2/24:  Multilevel discogenic degenerative disease and facet arthropathy of the lumbar spine, most pronounced at L2-L3 where there is moderate central canal narrowing, severe left neural foraminal narrowing and moderate right neural foraminal narrowing. Additional varying degrees of central canal and neural foraminal narrowing, as above.  Images Reviewed/Interpreted:  -MRI Brain 7/9/24:  Stable postsurgical changes in the right temporal lobe. No evidence of disease progression. No new metastases.  -PET/CT 7/18/24:   1. Compared to PET/CT January 25, 2024, there has been interval resolution of FDG avid groundglass opacities in the lungs. 2. Resolution of FDG avid skin nodule in the anterior chest. 3. No FDG avid disease.

## 2024-08-08 NOTE — PHYSICAL EXAM
[Ambulatory and capable of all self care but unable to carry out any work activities] : Status 2- Ambulatory and capable of all self care but unable to carry out any work activities. Up and about more than 50% of waking hours [Normal] : affect appropriate [de-identified] : NAD [de-identified] : No icterus  [de-identified] : PAU [de-identified] : No LAD  [de-identified] : Not SOB [de-identified] : No edema

## 2024-08-18 NOTE — DIETITIAN INITIAL EVALUATION ADULT - NUTRITIONGOAL OUTCOME1
Alert and oriented, no focal deficits, no motor or sensory deficits. Pt to meet > 80% of estimated nutrition needs via best tolerated route

## 2024-09-18 NOTE — REASON FOR VISIT
[Home] : at home, [unfilled] , at the time of the visit. [Medical Office: ([facility])___] : at the medical office located in  [Patient] : the patient [Follow-Up] : a follow-up visit [Other: _____] : [unfilled]

## 2024-09-18 NOTE — REASON FOR VISIT
[Home] : at home, [unfilled] , at the time of the visit. [Medical Office: ([facility])___] : at the medical office located in  [Patient] : the patient [Follow-Up] : a follow-up visit [Other: _____] : [unfilled]

## 2024-09-19 ENCOUNTER — APPOINTMENT (OUTPATIENT)
Dept: GERIATRICS | Facility: CLINIC | Age: 84
End: 2024-09-19
Payer: MEDICARE

## 2024-09-19 DIAGNOSIS — M54.9 DORSALGIA, UNSPECIFIED: ICD-10-CM

## 2024-09-19 DIAGNOSIS — M54.50 LOW BACK PAIN, UNSPECIFIED: ICD-10-CM

## 2024-09-19 DIAGNOSIS — R63.0 ANOREXIA: ICD-10-CM

## 2024-09-19 DIAGNOSIS — Z51.5 ENCOUNTER FOR PALLIATIVE CARE: ICD-10-CM

## 2024-09-19 DIAGNOSIS — K59.00 CONSTIPATION, UNSPECIFIED: ICD-10-CM

## 2024-09-19 DIAGNOSIS — C34.12 MALIGNANT NEOPLASM OF UPPER LOBE, LEFT BRONCHUS OR LUNG: ICD-10-CM

## 2024-09-19 DIAGNOSIS — M54.42 LUMBAGO WITH SCIATICA, LEFT SIDE: ICD-10-CM

## 2024-09-19 DIAGNOSIS — G89.29 OTHER CHRONIC PAIN: ICD-10-CM

## 2024-09-19 DIAGNOSIS — M25.559 PAIN IN UNSPECIFIED HIP: ICD-10-CM

## 2024-09-19 DIAGNOSIS — C79.31 SECONDARY MALIGNANT NEOPLASM OF BRAIN: ICD-10-CM

## 2024-09-19 PROCEDURE — 99215 OFFICE O/P EST HI 40 MIN: CPT

## 2024-09-19 NOTE — DISCHARGE NOTE PROVIDER - NSDCCPCAREPLAN_GEN_ALL_CORE_FT
PRINCIPAL DISCHARGE DIAGNOSIS  Diagnosis: Unstable angina  Assessment and Plan of Treatment:       SECONDARY DISCHARGE DIAGNOSES  Diagnosis: CAD (coronary artery disease)  Assessment and Plan of Treatment: Low salt, low fat diet.   Weight management.   Take medications as prescribed.   No smoking.  Follow up appointments with your doctor(s)  as instruced.  Continue your medications. Do not stop Aspirin or Plavix unless instructed by your cardiologist.  No heavy lifting, strenuous activity, bending, straining or unnecessary stair climbing for 2 weeks. No sex for 1 week.  No driving for 2 days. You may shower 24 hours following procedure but avoid baths and swimming for 1 week. Check groin site for bleeding and/or swelling daily following procedure. Call your doctor/cardiologist immediately for bleeding or swelling or if you have increased/persistent pain or drainage at the site. Follow up with your cardiologist in 1- 2 weeks. You may call [facility] at [phone] or [phone] after office hours and weekends with any questions or concerns following your procedure.     4 = No assist / stand by assistance

## 2024-09-19 NOTE — ASSESSMENT
[______] : HCP: [unfilled] [FreeTextEntry1] : 84yoF with:  # Stage IV Lung Adenocarcinoma - Keytruda (on hold since 11/2023); s/p gamma knife surgery. Med Onc follow up.  # Chronic lower back pain/hip pain - Patient has a multitude of lumbar spine issues as indicated on MRI LS Spine (7/2022).  Assessment is limited today given the visit is done virtually.  - C/w Tylenol 500-1000mg PRN. - C/W gabapentin to 200mg QAM and 300mg QHS. Will continue to increase gabapentin to therapeutic dosing as tolerated. -Increase Oxycodone dose to 7.5mg Q4h PRN, log usage.  Counseled on importance of maintaining bowel regularity in light of regular opioid use. - Previously provided referral information for the spine center for possible rehab and alternative approaches to her back pain.    # Decreased appetite - c/w medicinal cannabis 1:1  # Constipation - Recommend use of Miralax daily.  # Adjustment d/o - Empathetic listening and validation of patient's emotions provided.  # Encounter for palliative care- Health Care Proxy (HCP) form on file.  Follow up in 6 weeks, call with questions or issues.

## 2024-09-19 NOTE — HISTORY OF PRESENT ILLNESS
[FreeTextEntry1] : 84yoF with stage IV NSCLC presents for follow-up palliative care visit.  PMH significant for NSTEMI, DM2, Afib with RVR and RML PE, chronic low back pain, former smoker.   Patient reports that she was suffering with chronic low back pain and progressive gait instability, was evaluated by orthopedist in 2022. She underwent imaging of the spinerain imaging demonstrated evidence of metastatic disease including a dominant right temporal lobe 3.1 cm enhancing mass with vasogenic edema. CT scan of her body demonstrated a ANTHONY tumor with subcentimeter pulmonary nodules and a nonspecific adrenal nodule. Bronchoscopy with biopsy performed on 2022 demonstrated adenocarcinoma consistent with lung primary; tumor tested PD-L1 negative and was negative for actionable mutations. She underwent craniotomy resection of the dominant brain mass on 2022 with pathology revealing the same findings. Hospital course was complicated by NSTEMI, A. fib with RVR and RML PE. She was put on medications for seizure prophylaxis. She was put on anticoagulation and currently remains on prophylactic dosing of anticoagulation with enoxaparin 40 mg subcu daily, pending clearance for therapeutic anticoagulation by neurosurgery. She was subsequently discharged to acute rehab on 8/. Has initiated home PT services.   Pt met with Dr. Funes yesterday for evaluation for RT.  Patient presents to establish with palliative care early on.  She reports fatigue and weakness as her major issues lately.   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 She remains on a prednisone taper, currently 5mg, for autoimmune hepatitis. She follows with hepatology and her liver enzymes have now normalized.  Appetite is good. Constipated, moves her bowels every two to three days with Miralax as-needed. Mood fluctuates, she is frustrated with her loss of independence and ongoing pain.   Interval history (24): Patient seen via telemedicine for palliative medicine follow up, accompanied by her HHA, Myla and johanna Jennings.  Main reason for visit today is to discuss acute ongoing chronic pain.  Pt describes worsening of pain in her left low back, by the iliac crest. This pain is different for her as she typically has suffered with pain in her mid-back. The pain is not much of an issue when she lays down flat. She utilizes oxycodone 7.5mg daily, taken at 3am when she wakes to use the bathroom. She finds that dosing at this time allows her to awaken with less pain and is able to go about her day more comfortably. Encouraged to supplement with Tylenol 1000mg q 8 for mild pain. Remains on gabapentin 200mg QAM and 300mg QHS. Reports decreased appetite, prefers fresh fruits which increases her blood sugars. Denies N/V. Continues with constipation, bowel regimen in place.  She uses medical cannabis 1:1 tincture QHS for help with appetite, sleep. Has not benefited this acute pain. She reports not experiencing any seizure type activity as of late, continues to follow with neurology. Mood is good, she is looking forward to her granddaughters wedding next weekend. She states that her greatest wish is to be able to dance again.    Of note pt has a long history of spine pain related to spinal stenosis, this has been managed in the past with epidural injections and a pain regimen with Dr. Salazar. Her most recent epidural injection provided relief for ~4 days, she does not wish to try any further interventional procedures. She has stopped following with Dr. Salazar due to her limited ability to attend multiple physician appointments, and instead has been following closely with palliative care while at [facility]. She has significant weakness in her legs due to the pain and feels off balance. She ambulates with a rollator and unable to stand straight.     ROS: + mood labile + urticaria  + has been losing her hair (this precedes her cancer diagnosis) + appetite is low; improved + constipation, uses PRN Miralax + sleep disturbances, finds medical cannabis helpful + MOHS for SCC of chest wall Denies n/v Remainder of ROS non-contributory  Patient is , lives alone. She has one son who lives nearby (two other sons are ). She has a 24/7 HHA since being home, is requiring assistance with her ADLs. She has a shower chair, grab bars. Has not had any falls since being home. Prior to the surgery she was independent, driving.   PMD/Cardiologist: Dr. Kimani Uribe  Endocrinologist: Dr. Chaz Richardson Neurologist: Dmitry Hauser  I-STOP Ref#: [id number]

## 2024-09-19 NOTE — HISTORY OF PRESENT ILLNESS
[FreeTextEntry1] : 84yoF with stage IV NSCLC presents for follow-up palliative care visit.  PMH significant for NSTEMI, DM2, Afib with RVR and RML PE, chronic low back pain, former smoker.   Patient reports that she was suffering with chronic low back pain and progressive gait instability, was evaluated by orthopedist in 2022. She underwent imaging of the spinerain imaging demonstrated evidence of metastatic disease including a dominant right temporal lobe 3.1 cm enhancing mass with vasogenic edema. CT scan of her body demonstrated a ANTHONY tumor with subcentimeter pulmonary nodules and a nonspecific adrenal nodule. Bronchoscopy with biopsy performed on 2022 demonstrated adenocarcinoma consistent with lung primary; tumor tested PD-L1 negative and was negative for actionable mutations. She underwent craniotomy resection of the dominant brain mass on 2022 with pathology revealing the same findings. Hospital course was complicated by NSTEMI, A. fib with RVR and RML PE. She was put on medications for seizure prophylaxis. She was put on anticoagulation and currently remains on prophylactic dosing of anticoagulation with enoxaparin 40 mg subcu daily, pending clearance for therapeutic anticoagulation by neurosurgery. She was subsequently discharged to acute rehab on 8/. Has initiated home PT services.   Pt met with Dr. Funes yesterday for evaluation for RT.  Patient presents to establish with palliative care early on.  She reports fatigue and weakness as her major issues lately.   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 She remains on a prednisone taper, currently 5mg, for autoimmune hepatitis. She follows with hepatology and her liver enzymes have now normalized.  Appetite is good. Constipated, moves her bowels every two to three days with Miralax as-needed. Mood fluctuates, she is frustrated with her loss of independence and ongoing pain.   Interval history (24): Patient seen via telemedicine for palliative medicine follow up, accompanied by her HHA, Myla and johanna Jennings.  Main reason for visit today is to discuss acute ongoing chronic pain.  Pt describes worsening of pain in her left low back, by the iliac crest. This pain is different for her as she typically has suffered with pain in her mid-back. The pain is not much of an issue when she lays down flat. She utilizes oxycodone 7.5mg daily, taken at 3am when she wakes to use the bathroom. She finds that dosing at this time allows her to awaken with less pain and is able to go about her day more comfortably. Encouraged to supplement with Tylenol 1000mg q 8 for mild pain. Remains on gabapentin 200mg QAM and 300mg QHS. Reports decreased appetite, prefers fresh fruits which increases her blood sugars. Denies N/V. Continues with constipation, bowel regimen in place.  She uses medical cannabis 1:1 tincture QHS for help with appetite, sleep. Has not benefited this acute pain. She reports not experiencing any seizure type activity as of late, continues to follow with neurology. Mood is good, she is looking forward to her granddaughters wedding next weekend. She states that her greatest wish is to be able to dance again.    Of note pt has a long history of spine pain related to spinal stenosis, this has been managed in the past with epidural injections and a pain regimen with Dr. Salazar. Her most recent epidural injection provided relief for ~4 days, she does not wish to try any further interventional procedures. She has stopped following with Dr. Salazar due to her limited ability to attend multiple physician appointments, and instead has been following closely with palliative care while at [facility]. She has significant weakness in her legs due to the pain and feels off balance. She ambulates with a rollator and unable to stand straight.     ROS: + mood labile + urticaria  + has been losing her hair (this precedes her cancer diagnosis) + appetite is low; improved + constipation, uses PRN Miralax + sleep disturbances, finds medical cannabis helpful + MOHS for SCC of chest wall Denies n/v Remainder of ROS non-contributory  Patient is , lives alone. She has one son who lives nearby (two other sons are ). She has a 24/7 HHA since being home, is requiring assistance with her ADLs. She has a shower chair, grab bars. Has not had any falls since being home. Prior to the surgery she was independent, driving.   PMD/Cardiologist: Dr. Kimani Uribe  Endocrinologist: Dr. Chaz Richardson Neurologist: Dmitry Hauser  I-STOP Ref#: [id number]

## 2024-09-23 NOTE — PATIENT PROFILE ADULT - NSPROSPHOSPCHAPLAINYN_GEN_A_NUR
Report called and given to Sandra at [facility]:    Pt is discharging to [place] on 9/23/2024 date. In addition to standard report the facility was provide with the following detail......   Patient's baseline mentation a/o x 4 Medication details takes whole with water, last pain medication given at 0817 this morning.   Therapy needs daily, 2 pivot, 50% WB RLE   Wound care needs none   IV antibiotic needs and time of next dose n/a   Last COVID-19 test/ results negative   Oxygen and Hemodialysis needs 2L at night and when sleeping      no

## 2024-10-15 ENCOUNTER — RX ONLY (RX ONLY)
Age: 84
End: 2024-10-15

## 2024-10-15 ENCOUNTER — DOCTOR'S OFFICE (OUTPATIENT)
Facility: LOCATION | Age: 84
Setting detail: OPHTHALMOLOGY
End: 2024-10-15
Payer: MEDICARE

## 2024-10-15 DIAGNOSIS — E11.3511: ICD-10-CM

## 2024-10-15 DIAGNOSIS — E10.3413: ICD-10-CM

## 2024-10-15 PROBLEM — H16.222 DRY EYE SYNDROME K SICCA;  , LEFT EYE: Status: ACTIVE | Noted: 2024-10-15

## 2024-10-15 PROBLEM — H11.152 PINGUECULA; LEFT EYE: Status: ACTIVE | Noted: 2024-10-15

## 2024-10-15 PROBLEM — H16.421 CORNEAL PANNUS; RIGHT EYE: Status: ACTIVE | Noted: 2024-10-15

## 2024-10-15 PROBLEM — H16.233 NEUROTROPHIC KERATOCONJUNCTIVITIS; BOTH EYES: Status: ACTIVE | Noted: 2024-10-15

## 2024-10-15 PROBLEM — H01.001 BLEPHARITIS; RIGHT UPPER LID, LEFT UPPER LID: Status: ACTIVE | Noted: 2024-10-15

## 2024-10-15 PROBLEM — H00.022 HORDEOLUM INTERNUM; RIGHT LOWER LID, LEFT LOWER LID: Status: ACTIVE | Noted: 2024-10-15

## 2024-10-15 PROBLEM — H01.004 BLEPHARITIS; RIGHT UPPER LID, LEFT UPPER LID: Status: ACTIVE | Noted: 2024-10-15

## 2024-10-15 PROBLEM — H00.025 HORDEOLUM INTERNUM; RIGHT LOWER LID, LEFT LOWER LID: Status: ACTIVE | Noted: 2024-10-15

## 2024-10-15 PROBLEM — E11.3512 DM TYPE 2; RIGHT PROLIFERATIVE WITH ME, LEFT PROLIFERATIVE WITH ME: Status: ACTIVE | Noted: 2024-10-15

## 2024-10-15 PROBLEM — H17.823 CORNEAL SCAR PERIPHERAL; BOTH EYES: Status: ACTIVE | Noted: 2024-10-15

## 2024-10-15 PROCEDURE — 92134 CPTRZ OPH DX IMG PST SGM RTA: CPT | Performed by: OPHTHALMOLOGY

## 2024-10-15 PROCEDURE — 92235 FLUORESCEIN ANGRPH MLTIFRAME: CPT | Performed by: OPHTHALMOLOGY

## 2024-10-15 PROCEDURE — 67210 TREATMENT OF RETINAL LESION: CPT | Mod: RT | Performed by: OPHTHALMOLOGY

## 2024-10-15 ASSESSMENT — SUPERFICIAL PUNCTATE KERATITIS (SPK)
OD_SPK: 2+
OS_SPK: 2+

## 2024-10-15 ASSESSMENT — CONFRONTATIONAL VISUAL FIELD TEST (CVF)
OD_COMMENTS: PRP
OD_FINDINGS: CONSTRICTION
OS_COMMENTS: PRP
OS_FINDINGS: CONSTRICTION

## 2024-10-15 ASSESSMENT — VISUAL ACUITY
OD_BCVA: 20/25
OS_BCVA: 20/20

## 2024-11-07 ENCOUNTER — DOCTOR'S OFFICE (OUTPATIENT)
Facility: LOCATION | Age: 84
Setting detail: OPHTHALMOLOGY
End: 2024-11-07
Payer: MEDICARE

## 2024-11-07 DIAGNOSIS — H11.152: ICD-10-CM

## 2024-11-07 DIAGNOSIS — H16.233: ICD-10-CM

## 2024-11-07 DIAGNOSIS — H16.222: ICD-10-CM

## 2024-11-07 DIAGNOSIS — H17.823: ICD-10-CM

## 2024-11-07 PROCEDURE — 99213 OFFICE O/P EST LOW 20 MIN: CPT | Mod: 24 | Performed by: OPHTHALMOLOGY

## 2024-11-07 ASSESSMENT — REFRACTION_AUTOREFRACTION
OS_CYLINDER: +1.50
OS_AXIS: 153
OD_SPHERE: PLANO
OD_AXIS: 146
OD_CYLINDER: +1.00
OS_SPHERE: -1.50

## 2024-11-07 ASSESSMENT — VISUAL ACUITY
OD_BCVA: 20/20-2
OS_BCVA: 20/20-1

## 2024-11-07 ASSESSMENT — CONFRONTATIONAL VISUAL FIELD TEST (CVF)
OD_FINDINGS: FULL
OS_FINDINGS: FULL

## 2024-11-07 ASSESSMENT — KERATOMETRY
OD_AXISANGLE_DEGREES: 155
OS_K2POWER_DIOPTERS: 43.75
OD_K1POWER_DIOPTERS: 40.75
OD_K2POWER_DIOPTERS: 44.00
OS_K1POWER_DIOPTERS: 42.50
OS_AXISANGLE_DEGREES: 167

## 2024-11-21 ENCOUNTER — OUTPATIENT (OUTPATIENT)
Dept: OUTPATIENT SERVICES | Facility: HOSPITAL | Age: 84
LOS: 1 days | End: 2024-11-21
Payer: MEDICARE

## 2024-11-21 ENCOUNTER — APPOINTMENT (OUTPATIENT)
Dept: NUCLEAR MEDICINE | Facility: IMAGING CENTER | Age: 84
End: 2024-11-21
Payer: MEDICARE

## 2024-11-21 DIAGNOSIS — I25.10 ATHEROSCLEROTIC HEART DISEASE OF NATIVE CORONARY ARTERY WITHOUT ANGINA PECTORIS: Chronic | ICD-10-CM

## 2024-11-21 DIAGNOSIS — I73.9 PERIPHERAL VASCULAR DISEASE, UNSPECIFIED: Chronic | ICD-10-CM

## 2024-11-21 DIAGNOSIS — C34.12 MALIGNANT NEOPLASM OF UPPER LOBE, LEFT BRONCHUS OR LUNG: ICD-10-CM

## 2024-11-21 PROCEDURE — 78815 PET IMAGE W/CT SKULL-THIGH: CPT | Mod: 26,PS,KX,MH

## 2024-11-21 PROCEDURE — A9552: CPT

## 2024-11-21 PROCEDURE — 78815 PET IMAGE W/CT SKULL-THIGH: CPT

## 2024-12-02 ENCOUNTER — OUTPATIENT (OUTPATIENT)
Dept: OUTPATIENT SERVICES | Facility: HOSPITAL | Age: 84
LOS: 1 days | Discharge: HOME | End: 2024-12-02

## 2024-12-02 DIAGNOSIS — C34.90 MALIGNANT NEOPLASM OF UNSPECIFIED PART OF UNSPECIFIED BRONCHUS OR LUNG: ICD-10-CM

## 2024-12-02 DIAGNOSIS — I25.10 ATHEROSCLEROTIC HEART DISEASE OF NATIVE CORONARY ARTERY WITHOUT ANGINA PECTORIS: Chronic | ICD-10-CM

## 2024-12-02 DIAGNOSIS — I73.9 PERIPHERAL VASCULAR DISEASE, UNSPECIFIED: Chronic | ICD-10-CM

## 2024-12-06 ENCOUNTER — APPOINTMENT (OUTPATIENT)
Dept: HEMATOLOGY ONCOLOGY | Facility: CLINIC | Age: 84
End: 2024-12-06
Payer: MEDICARE

## 2024-12-06 DIAGNOSIS — M54.50 LOW BACK PAIN, UNSPECIFIED: ICD-10-CM

## 2024-12-06 DIAGNOSIS — C34.12 MALIGNANT NEOPLASM OF UPPER LOBE, LEFT BRONCHUS OR LUNG: ICD-10-CM

## 2024-12-06 DIAGNOSIS — I26.99 OTHER PULMONARY EMBOLISM W/OUT ACUTE COR PULMONALE: ICD-10-CM

## 2024-12-06 DIAGNOSIS — L29.9 PRURITUS, UNSPECIFIED: ICD-10-CM

## 2024-12-06 PROCEDURE — 99213 OFFICE O/P EST LOW 20 MIN: CPT

## 2024-12-06 PROCEDURE — G2211 COMPLEX E/M VISIT ADD ON: CPT

## 2024-12-09 ENCOUNTER — NON-APPOINTMENT (OUTPATIENT)
Age: 84
End: 2024-12-09

## 2024-12-09 ENCOUNTER — APPOINTMENT (OUTPATIENT)
Dept: NEUROLOGY | Facility: CLINIC | Age: 84
End: 2024-12-09
Payer: MEDICARE

## 2024-12-09 ENCOUNTER — OUTPATIENT (OUTPATIENT)
Dept: OUTPATIENT SERVICES | Facility: HOSPITAL | Age: 84
LOS: 1 days | End: 2024-12-09
Payer: MEDICARE

## 2024-12-09 ENCOUNTER — APPOINTMENT (OUTPATIENT)
Dept: MRI IMAGING | Facility: IMAGING CENTER | Age: 84
End: 2024-12-09
Payer: MEDICARE

## 2024-12-09 VITALS
HEART RATE: 85 BPM | SYSTOLIC BLOOD PRESSURE: 122 MMHG | DIASTOLIC BLOOD PRESSURE: 89 MMHG | RESPIRATION RATE: 16 BRPM | OXYGEN SATURATION: 99 % | HEIGHT: 62.5 IN | TEMPERATURE: 98.1 F | BODY MASS INDEX: 28.17 KG/M2 | WEIGHT: 157 LBS

## 2024-12-09 DIAGNOSIS — I25.10 ATHEROSCLEROTIC HEART DISEASE OF NATIVE CORONARY ARTERY WITHOUT ANGINA PECTORIS: Chronic | ICD-10-CM

## 2024-12-09 DIAGNOSIS — G40.802 OTHER EPILEPSY, NOT INTRACTABLE, W/OUT STATUS EPILEPTICUS: ICD-10-CM

## 2024-12-09 DIAGNOSIS — C79.31 SECONDARY MALIGNANT NEOPLASM OF BRAIN: ICD-10-CM

## 2024-12-09 DIAGNOSIS — C71.9 MALIGNANT NEOPLASM OF BRAIN, UNSPECIFIED: ICD-10-CM

## 2024-12-09 DIAGNOSIS — R47.01 APHASIA: ICD-10-CM

## 2024-12-09 DIAGNOSIS — I73.9 PERIPHERAL VASCULAR DISEASE, UNSPECIFIED: Chronic | ICD-10-CM

## 2024-12-09 PROCEDURE — A9585: CPT

## 2024-12-09 PROCEDURE — 70553 MRI BRAIN STEM W/O & W/DYE: CPT | Mod: 26,MH

## 2024-12-09 PROCEDURE — 99214 OFFICE O/P EST MOD 30 MIN: CPT

## 2024-12-09 PROCEDURE — 70553 MRI BRAIN STEM W/O & W/DYE: CPT

## 2025-01-02 ENCOUNTER — DOCTOR'S OFFICE (OUTPATIENT)
Facility: LOCATION | Age: 85
Setting detail: OPHTHALMOLOGY
End: 2025-01-02
Payer: MEDICARE

## 2025-01-02 DIAGNOSIS — H16.222: ICD-10-CM

## 2025-01-02 DIAGNOSIS — H11.152: ICD-10-CM

## 2025-01-02 DIAGNOSIS — H16.233: ICD-10-CM

## 2025-01-02 DIAGNOSIS — H17.823: ICD-10-CM

## 2025-01-02 PROCEDURE — 99212 OFFICE O/P EST SF 10 MIN: CPT | Performed by: OPHTHALMOLOGY

## 2025-01-02 ASSESSMENT — VISUAL ACUITY
OD_BCVA: 20/20-2
OS_BCVA: 20/20-

## 2025-01-02 ASSESSMENT — REFRACTION_AUTOREFRACTION
OD_AXIS: 143
OD_SPHERE: -0.25
OD_CYLINDER: +1.00
OS_CYLINDER: +1.00
OS_AXIS: 154
OS_SPHERE: -1.00

## 2025-01-02 ASSESSMENT — KERATOMETRY
OD_K1POWER_DIOPTERS: 40.25
OD_AXISANGLE_DEGREES: 163
OS_AXISANGLE_DEGREES: 161
OD_K2POWER_DIOPTERS: 43.50
OS_K2POWER_DIOPTERS: 43.50
OS_K1POWER_DIOPTERS: 42.25

## 2025-01-02 ASSESSMENT — CONFRONTATIONAL VISUAL FIELD TEST (CVF)
OD_FINDINGS: FULL
OS_FINDINGS: FULL

## 2025-01-23 ENCOUNTER — DOCTOR'S OFFICE (OUTPATIENT)
Facility: LOCATION | Age: 85
Setting detail: OPHTHALMOLOGY
End: 2025-01-23
Payer: MEDICARE

## 2025-01-23 DIAGNOSIS — H35.3131: ICD-10-CM

## 2025-01-23 DIAGNOSIS — H35.373: ICD-10-CM

## 2025-01-23 DIAGNOSIS — E11.3511: ICD-10-CM

## 2025-01-23 DIAGNOSIS — H43.813: ICD-10-CM

## 2025-01-23 DIAGNOSIS — E11.3512: ICD-10-CM

## 2025-01-23 PROCEDURE — 92134 CPTRZ OPH DX IMG PST SGM RTA: CPT | Performed by: OPHTHALMOLOGY

## 2025-01-23 PROCEDURE — 99213 OFFICE O/P EST LOW 20 MIN: CPT | Performed by: OPHTHALMOLOGY

## 2025-01-23 PROCEDURE — 92235 FLUORESCEIN ANGRPH MLTIFRAME: CPT | Performed by: OPHTHALMOLOGY

## 2025-01-23 ASSESSMENT — REFRACTION_AUTOREFRACTION
OD_CYLINDER: +1.00
OD_SPHERE: -0.25
OS_SPHERE: -1.00
OS_AXIS: 154
OS_CYLINDER: +1.00
OD_AXIS: 143

## 2025-01-23 ASSESSMENT — LID EXAM ASSESSMENTS
OS_BLEPHARITIS: LUL T
OS_MEIBOMITIS: LLL 1+
OD_BLEPHARITIS: RUL T
OD_MEIBOMITIS: RLL 1+

## 2025-01-23 ASSESSMENT — KERATOMETRY
OD_K2POWER_DIOPTERS: 43.50
OS_AXISANGLE_DEGREES: 161
OD_K1POWER_DIOPTERS: 40.25
OS_K1POWER_DIOPTERS: 42.25
OD_AXISANGLE_DEGREES: 163
OS_K2POWER_DIOPTERS: 43.50

## 2025-01-23 ASSESSMENT — CONFRONTATIONAL VISUAL FIELD TEST (CVF)
OD_FINDINGS: FULL
OS_FINDINGS: FULL

## 2025-01-23 ASSESSMENT — VISUAL ACUITY
OS_BCVA: 20/20-
OD_BCVA: 20/20-

## 2025-02-20 NOTE — PROVIDER CONTACT NOTE (CRITICAL VALUE NOTIFICATION) - SITUATION
patient troponin 697
Mood disorder/Psychotic disorder/Alcohol/Substance Use disorders/Cluster B Personality disorder/traits
patient Mg 0.9

## 2025-03-20 NOTE — ED PROVIDER NOTE - NS ED MD DISPO ISOLATION TYPES
Elena Martell is a 54 y.o. female here for a non-provider visit for PPD reading -- Step 1 of 1.      1.  Resulted in Epic under enter/edit results? Yes   2.  TB evaluation questionnaire scanned into chart and original given to patient?Yes      3. Was induration greater than 0 mm? Yes, verified by Provider: 18mm.    XRAY ORDERED - PER PROVIDER, XRAY WAS NEGATIVE   None

## 2025-03-31 NOTE — PROGRESS NOTE ADULT - SUBJECTIVE AND OBJECTIVE BOX
Called and spoke with patient regarding PA. Patient verbalized understanding and has no questions at this time.     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; ataxia; frontal lobe syndrome    INTERVAL HISTORY: s/p bronchoscopy with endobronchial brushings/biopsies of an obstructing left upper lobe endobronchial lesion -> preliminary pathology c/w poorly differentiated cancer; no shortness of breath or hypoxemia on room air; no cough, sputum production, hemoptysis, chest congestion or wheeze; no fevers, chills or sweats; no chest pain/pressure or palpitations; ongoing decreased inhibition, pressured speech and ataxia    REVIEW OF SYSTEMS:  Constitutional: As per interval history  HEENT: Within normal limits  CV: As per interval history  Resp: As per interval history  GI: Within normal limits   : Within normal limits  Musculoskeletal: Within normal limits  Skin: Within normal limits  Neurological: ataxia - pressured speech  Psychiatric: Within normal limits  Endocrine: Within normal limits  Hematologic/Lymphatic: lung cancer  Allergic/Immunologic: Within normal limits    MEDICATIONS:     Pulmonary "    Anti-microbials:    Cardiovascular:  hydrochlorothiazide 25 milliGRAM(s) Oral daily  losartan 100 milliGRAM(s) Oral daily  metoprolol tartrate 50 milliGRAM(s) Oral three times a day    Other:  atorvastatin 40 milliGRAM(s) Oral at bedtime  chlorhexidine 2% Cloths 1 Application(s) Topical daily  dexAMETHasone     Tablet 4 milliGRAM(s) Oral every 6 hours  dextrose 5%. 1000 milliLiter(s) IV Continuous <Continuous>  dextrose 5%. 1000 milliLiter(s) IV Continuous <Continuous>  dextrose 50% Injectable 25 Gram(s) IV Push once  dextrose 50% Injectable 12.5 Gram(s) IV Push once  dextrose 50% Injectable 25 Gram(s) IV Push once  gabapentin 200 milliGRAM(s) Oral at bedtime  glucagon  Injectable 1 milliGRAM(s) IntraMuscular once  insulin glargine Injectable (LANTUS) 30 Unit(s) SubCutaneous at bedtime  insulin lispro (ADMELOG) corrective regimen sliding scale   SubCutaneous three times a day before meals  insulin lispro (ADMELOG) corrective regimen sliding scale   SubCutaneous at bedtime  insulin lispro Injectable (ADMELOG) 12 Unit(s) SubCutaneous before lunch  insulin lispro Injectable (ADMELOG) 16 Unit(s) SubCutaneous before dinner  insulin lispro Injectable (ADMELOG) 16 Unit(s) SubCutaneous before breakfast  levETIRAcetam 500 milliGRAM(s) Oral two times a day  melatonin 3 milliGRAM(s) Oral at bedtime  sodium chloride 0.9%. 1000 milliLiter(s) IV Continuous <Continuous>    MEDICATIONS  (PRN):  dextrose Oral Gel 15 Gram(s) Oral once PRN Blood Glucose LESS THAN 70 milliGRAM(s)/deciliter      OBJECTIVE:    POCT Blood Glucose.: 119 mg/dL (25 Jul 2022 11:31)  POCT Blood Glucose.: 108 mg/dL (25 Jul 2022 07:35)  POCT Blood Glucose.: 122 mg/dL (24 Jul 2022 21:31)  POCT Blood Glucose.: 108 mg/dL (24 Jul 2022 16:37)      PHYSICAL EXAM:       ICU Vital Signs Last 24 Hrs  T(C): 36.7 (25 Jul 2022 09:56), Max: 36.9 (24 Jul 2022 16:00)  T(F): 98 (25 Jul 2022 09:56), Max: 98.5 (24 Jul 2022 16:00)  HR: 56 (25 Jul 2022 09:56) (56 - 72)  BP: 109/49 (25 Jul 2022 09:56) (101/54 - 109/49)  BP(mean): --  ABP: --  ABP(mean): --  RR: 18 (25 Jul 2022 09:56) (18 - 18)  SpO2: 99% (25 Jul 2022 09:56) (95% - 99%) on room air     General: Awake. Alert. Cooperative. No distress. Appears stated age. Loquacious. Pressured speech.	  HEENT:  Atraumatic. Normocephalic. Anicteric. Normal oral mucosa. PERRL. EOMI.  Neck: Supple. Trachea midline. Thyroid without enlargement/tenderness/nodules. No carotid bruit. No JVD.	  Cardiovascular: Regular rate and rhythm. S1 S2 normal. No murmurs, rubs or gallops.  Respiratory: Respirations unlabored. Clear to auscultation and percussion bilaterally. No curvature.  Abdomen: Soft. Non-tender. Non-distended. No organomegaly. No masses. Normal bowel sounds.  Extremities: Warm to touch. No clubbing or cyanosis. No pedal edema.  Pulses: 2+ peripheral pulses all extremities.	  Skin: Normal skin color. No rashes or lesions. No ecchymoses. No cyanosis. Warm to touch.  Lymph Nodes: Cervical, supraclavicular and axillary nodes normal  Neurological: Ataxic gait. A and O x 3  Psychiatry: Appropriate mood and affect.    LABS:                          15.2   14.95 )-----------( 374      ( 24 Jul 2022 06:15 )             47.1     CBC    WBC  14.95 <==, 15.12 <==, 11.97 <==, 9.73 <==    Hemoglobin  15.2 <<==, 13.6 <<==, 13.3 <<==, 13.8 <<==    Hematocrit  47.1 <==, 41.8 <==, 41.5 <==, 43.2 <==    Platelets  374 <==, 417 <==, 329 <==, 387 <==      141  |  107  |  52<H>  ----------------------------<  75    07-25  4.2   |  23  |  1.06      LYTES    sodium  141 <==, 142 <==, 138 <==, 140 <==, 142 <==    potassium   4.2 <==, 3.3 <==, 4.1 <==, 4.7 <==, 3.8 <==    chloride  107 <==, 102 <==, 100 <==, 104 <==, 103 <==    carbon dioxide  23 <==, 26 <==, 23 <==, 22 <==, 25 <==    =============================================================================================  RENAL FUNCTION:    Creatinine:   1.06  <<==, 1.08  <<==, 0.98  <<==, 1.08  <<==, 0.90  <<==    BUN:   52 <==, 38 <==, 38 <==, 36 <==, 29 <==    ============================================================================================    calcium   8.8 <==, 9.6 <==, 9.8 <==, 9.8 <==, 10.3 <==    phos   3.3 <==, 3.4 <==    mag   2.2 <==, 1.5 <==    ============================================================================================  LFTs    AST:   16 <==     ALT:  9  <==     AP:  96  <=    Bili:  0.4  <=    PT/INR - ( 24 Jul 2022 06:15 )   PT: 10.1 sec;   INR: 0.87 ratio       PTT - ( 24 Jul 2022 06:15 )  PTT:25.7 sec    < from: TTE with Doppler (w/Cont) (07.22.22 @ 10:09) >    Patient name: LANIE BERTRAND  YOB: 1940   Age: 82 (F)   MR#: [id number]  Study Date: 7/22/2022  Location: [address] [place], [place] [id number]: Yelena Louis RDCS  Study quality: Technically difficult  Referring Physician: Mk Garza MD  BloodPressure: 116/72 mmHg  Height: 165 cm  Weight: 73 kg  BSA: 1.8 m2  ------------------------------------------------------------------------  PROCEDURE: Transthoracic echocardiogram with 2-D, M-Mode  and complete spectral and color flow Doppler.  INDICATION: Encounter for other preprocedural examination  (Z01.818)  ------------------------------------------------------------------------  Dimensions:    Normal Values:  LA:     3.4    2.0 - 4.0 cm  Ao:     3.1    2.0 - 3.8 cm  SEPTUM: 1.0    0.6 - 1.2 cm  PWT:    0.8    0.6 - 1.1 cm  LVIDd:  4.8    3.0 - 5.6 cm  LVIDs:  3.7    1.8 - 4.0 cm  Derived variables:  LVMI: 82 g/m2  RWT: 0.33  EF (Visual Estimate): 55 %  ------------------------------------------------------------------------  Observations:  Mitral Valve: Mitral annular calcification.  Aortic Valve/Aorta: Normal aortic valve.  Normal aortic root size.  Left Atrium: Normal left atrium.  Left Ventricle: Endocardial visualization enhanced with  intravenous injection of Ultrasonic Enhancing Agent  (Lumason).  Normal left ventricular internal dimensions with discrete  upper septal hypertrophy.  Estimated ejection fraction 55%, The inferolateral wall is  akinetic.  Impaired LV-relaxation with normal filling pressure.  Right Heart: Normal right atrium. Normal right ventricular  size and function.  Normal tricuspid valve.  Normal pulmonic valve. Mild pulmonic regurgitation.  Pericardium/Pleura: Normal pericardium with no pericardial  effusion.  Hemodynamic: Estimated right atrial pressure is normal.  No evidence of pulmonary hypertension.  ------------------------------------------------------------------------  Conclusions:  Endocardial visualization enhanced with intravenous  injection of Ultrasonic Enhancing Agent (Lumason).  Normal left ventricular internal dimensions with discrete  upper septal hypertrophy.  Estimated ejection fraction 55%, The inferolateral wall is  akinetic.  ------------------------------------------------------------------------  Confirmed on  7/22/2022 - 14:42:07 by David Vicente MD, FASE  ------------------------------------------------------------------------  ---------------------------------------------------------------------------------------------------------------        MICROBIOLOGY:   COVID-19 PCR . (07.18.22 @ 23:25)   [facility]    Culture - Bronchial (07.20.22 @ 16:52)   Culture Results:   Normal Respiratory Tamy present     Culture - Acid Fast - Bronchial w/Smear (07.20.22 @ 16:52)   Specimen Source: .Bronchial Bronchial Lavage   Acid Fast Bacilli Smear:   No acid fast bacilli seen by fluorochrome stain     RADIOLOGY:  [x ] Chest radiographs reviewed and interpreted by me    EXAM:  CT ABDOMEN AND PELVIS IC                        EXAM:  CT CHEST IC                          PROCEDURE DATE:  07/18/2022      FINDINGS:  CHEST:  LUNGS AND LARGE AIRWAYS: There is a heterogeneous left upper lobe mass   approximately measuring 3.3 x 2.6 cm (2, 28) obstructing the left apical   posterior bronchus with partial atelectasis of the left upper lobe. Few   scattered bilateral pulmonary nodules measuring up to 3 mm in the right   upper lobe (2, 39).  PLEURA: No pleural effusion.  VESSELS: Atherosclerotic changes of the aorta and coronary arteries.  HEART: Heart size is normal. No pericardial effusion.  MEDIASTINUM AND SIXTO: Soft tissue contiguous with mass versus adenopathy   measuring 1.7 x 1.5 cm (2, 33).  CHEST WALL AND LOWER NECK: Bilateral subcentimeter hypoattenuating   thyroid nodules.    ABDOMEN AND PELVIS:  LIVER: Within normal limits.  BILE DUCTS: Normal caliber.  GALLBLADDER: Within normal limits.  SPLEEN: Within normal limits.  PANCREAS: Within normal limits.  ADRENALS: Indeterminant left adrenal nodule measuring 1.2 cm.  KIDNEYS/URETERS: No hydronephrosis. Renal cysts and subcentimeter   hypoattenuating foci bilaterally, too small to characterize.    BLADDER: Within normal limits.  REPRODUCTIVE ORGANS: Uterus and adnexa within normal limits.    BOWEL: Colonic diverticula withoutevidence of acute diverticulitis. No   bowel obstruction. Appendix is normal.  PERITONEUM: No ascites.  VESSELS: Atherosclerotic changes.  RETROPERITONEUM/LYMPH NODES: No lymphadenopathy.  ABDOMINAL WALL: Within normal limits.  BONES: Degenerative changes. Grade 1 anterolisthesis of L5 on S1. Status   post right shoulder arthroplasty.    IMPRESSION:  Left upper lobe lung mass with partial atelectasis of the left upper   lobe, concerning for primary lung neoplasm. Few scattered sub-4 mm   pulmonary nodules, indeterminate.    Indeterminant left adrenal nodule for which contrast enhanced MRI is   recommended for further evaluation.    DEEPAK HARRINGTON MD; Resident Radiologist  This document has been electronically signed.  HAYLEY DENTON MD; Attending Radiologist  This document has been electronically signed. Jul 19 2022  9:02AM  ---------------------------------------------------------------------------------------------------------------  EXAM:  CT BRAIN                          PROCEDURE DATE:  07/18/2022      FINDINGS:  A cystic versus centrally necrotic mass in the right temporal lobe   measures approximately 4.8 x 3.2 x 3.2 cm (602:15 and 601:33).    There moderate tolarge amount of vasogenic edema in the right frontal,   parietal and temporal lobes.  There is regional mass effect in the right   cerebral hemisphere with partial effacement of the right lateral   ventricle and third ventricle.  There is medial bulging of the right   thalamus across the midline by up to 1.4 cm (2:17).  There is   approximately 5 mm subfalcine subfalcine herniation of the right frontal   lobe underneath the cerebral falx (2:21).    There is an approximately 1.4 x 1.3 x 1.6 cm cystic versus centimeter   necrotic mass in the medial left frontal lobe.  There appears to be trace   edema surrounding this lesion, without significant mass effect.    There is no evidence of acute intracranial hemorrhage, transtentorial   herniation or acute territorial infarct.  Mild prominence the left   lateral ventricle may be related to pre-existing cerebral volume loss   rather than hydrocephalus.    The paranasal sinuses and mastoids are grossly clear.    The patient is status post cataractlens placement surgery bilaterally.    The calvarium and skull base appear within normal limits.    IMPRESSION:  Cystic versus a necrotic masses measure approximately 4.8 x 3.2 x 3.2 cm   in the right temporal lobe and 1.4 x 1.3 x 1.6 cm in the medial left   frontal lobe.    Moderate to large amount of vasogenic edema in the right frontal,   parietal and temporal lobes.  Trace edema in the left frontal lobe.    Regional mass effect in the right cerebral hemisphere with partial   effacement of theventricular system and 5 mm subfalcine herniation of   the right frontal lobe underneath the cerebral falx.    The findings may represent intracranial metastatic disease versus   glioblastoma.  Contrast-enhanced brain MRI is recommended for further   evaluation.      Findings discussed with DAVID Albarran by Dr. Harrington at 2153 hours on   7/8/2022 with readback confirmation.    DEEPAK HARRINGTON MD; Resident Radiologist  This document has been electronically signed.  PHILLY CISSE MD; Attending Radiologist  This document has been electronically signed. Jul 18 2022 10:15PM  ---------------------------------------------------------------------------------------------------------------  EXAM:  MR BRAIN WAW IC                          PROCEDURE DATE:  07/20/2022      FINDINGS:    Right temporal lobe peripherally enhancing, centrally necrotic 3.1 x 2.6   cm mass demonstrating mild diffusion restriction. There is surrounding   vasogenic edema. 8 mm leftward midline shift. Right uncal herniation.   Additional left parafalcine 1.3 x 0.9 cm rim-enhancing cystic lesion with   mild surrounding vasogenic edema. Tiny 0.3 x 0.3 cm rim-enhancing lesion   within the left frontal cortex (10:125).    Scattered foci of T2/FLAIR hyperintensity in the bilateral hemispheric   white matter are nonspecific but likely related to chronic white matter   microvascular ischemic disease. The left lateral ventricle is mildly   dilated. Flow voids of the major intracranial vessels at the skull base   follow expected course and contour.    The paranasal sinuses demonstrate no signal abnormality. The mastoids   demonstrate no signal abnormality.  Status post bilateral intraocular   lens implants.      IMPRESSION:  Right temporal lobe peripherally enhancing, centrally necrotic 3.1 x 2.6   cm mass demonstrating mild diffusion restriction. There is surrounding   vasogenic edema. 8 mm leftward midline shift. Right uncal herniation.   Additional left parafalcine 1.3 x 0.9 cm rim-enhancing cystic lesion with   mild surrounding vasogenic edema. Tiny 0.3 x 0.3 cm rim-enhancing lesion   within the left frontal cortex. Findings are concerning for metastatic   disease in the context of patient's known primary lung cancer. The left   lateral ventricle is mildly dilated.    NEIL DIOP MD; Resident Radiologist  This document has been electronically signed.  NERI BARRERA MD; Attending Radiologist  This document has been electronically signed. Jul 21 2022  4:26PM  ---------------------------------------------------------------------------------------------------------------  EXAM:  DUPLEX SCAN EXT VEINS LOWER BI                          PROCEDURE DATE:  07/24/2022      IMPRESSION:  No evidence of deep venous thrombosis in either lower extremity.    TISH VIDAL MD; Attending Radiologist  This document has been electronically signed. Jul 24 2022 11:19AM  ---------------------------------------------------------------------------------------------------------------

## 2025-04-03 ENCOUNTER — APPOINTMENT (OUTPATIENT)
Dept: NUCLEAR MEDICINE | Facility: IMAGING CENTER | Age: 85
End: 2025-04-03
Payer: MEDICARE

## 2025-04-03 ENCOUNTER — OUTPATIENT (OUTPATIENT)
Dept: OUTPATIENT SERVICES | Facility: HOSPITAL | Age: 85
LOS: 1 days | Discharge: HOME | End: 2025-04-03

## 2025-04-03 ENCOUNTER — OUTPATIENT (OUTPATIENT)
Dept: OUTPATIENT SERVICES | Facility: HOSPITAL | Age: 85
LOS: 1 days | End: 2025-04-03
Payer: MEDICARE

## 2025-04-03 DIAGNOSIS — C34.90 MALIGNANT NEOPLASM OF UNSPECIFIED PART OF UNSPECIFIED BRONCHUS OR LUNG: ICD-10-CM

## 2025-04-03 DIAGNOSIS — I25.10 ATHEROSCLEROTIC HEART DISEASE OF NATIVE CORONARY ARTERY WITHOUT ANGINA PECTORIS: Chronic | ICD-10-CM

## 2025-04-03 DIAGNOSIS — C34.12 MALIGNANT NEOPLASM OF UPPER LOBE, LEFT BRONCHUS OR LUNG: ICD-10-CM

## 2025-04-03 DIAGNOSIS — I73.9 PERIPHERAL VASCULAR DISEASE, UNSPECIFIED: Chronic | ICD-10-CM

## 2025-04-03 PROCEDURE — 78815 PET IMAGE W/CT SKULL-THIGH: CPT | Mod: 26,PS,KX

## 2025-04-03 PROCEDURE — A9552: CPT

## 2025-04-03 PROCEDURE — 78815 PET IMAGE W/CT SKULL-THIGH: CPT

## 2025-04-08 ENCOUNTER — APPOINTMENT (OUTPATIENT)
Dept: HEMATOLOGY ONCOLOGY | Facility: CLINIC | Age: 85
End: 2025-04-08
Payer: MEDICARE

## 2025-04-08 ENCOUNTER — APPOINTMENT (OUTPATIENT)
Dept: GERIATRICS | Facility: CLINIC | Age: 85
End: 2025-04-08
Payer: MEDICARE

## 2025-04-08 DIAGNOSIS — Z51.5 ENCOUNTER FOR PALLIATIVE CARE: ICD-10-CM

## 2025-04-08 DIAGNOSIS — G62.9 POLYNEUROPATHY, UNSPECIFIED: ICD-10-CM

## 2025-04-08 DIAGNOSIS — F43.21 ADJUSTMENT DISORDER WITH DEPRESSED MOOD: ICD-10-CM

## 2025-04-08 DIAGNOSIS — M54.50 LOW BACK PAIN, UNSPECIFIED: ICD-10-CM

## 2025-04-08 DIAGNOSIS — C79.31 SECONDARY MALIGNANT NEOPLASM OF BRAIN: ICD-10-CM

## 2025-04-08 DIAGNOSIS — R63.0 ANOREXIA: ICD-10-CM

## 2025-04-08 DIAGNOSIS — J44.9 CHRONIC OBSTRUCTIVE PULMONARY DISEASE, UNSPECIFIED: ICD-10-CM

## 2025-04-08 DIAGNOSIS — C34.12 MALIGNANT NEOPLASM OF UPPER LOBE, LEFT BRONCHUS OR LUNG: ICD-10-CM

## 2025-04-08 DIAGNOSIS — K59.00 CONSTIPATION, UNSPECIFIED: ICD-10-CM

## 2025-04-08 PROCEDURE — 99213 OFFICE O/P EST LOW 20 MIN: CPT | Mod: 2W

## 2025-04-08 PROCEDURE — 99215 OFFICE O/P EST HI 40 MIN: CPT | Mod: 2W

## 2025-04-29 ENCOUNTER — APPOINTMENT (OUTPATIENT)
Dept: PULMONOLOGY | Facility: CLINIC | Age: 85
End: 2025-04-29
Payer: MEDICARE

## 2025-04-29 VITALS
HEART RATE: 70 BPM | WEIGHT: 161 LBS | RESPIRATION RATE: 15 BRPM | HEIGHT: 62 IN | DIASTOLIC BLOOD PRESSURE: 75 MMHG | SYSTOLIC BLOOD PRESSURE: 120 MMHG | BODY MASS INDEX: 29.63 KG/M2 | TEMPERATURE: 98 F | OXYGEN SATURATION: 99 %

## 2025-04-29 PROCEDURE — 99215 OFFICE O/P EST HI 40 MIN: CPT

## 2025-04-29 PROCEDURE — G2211 COMPLEX E/M VISIT ADD ON: CPT

## 2025-05-01 ENCOUNTER — NON-APPOINTMENT (OUTPATIENT)
Age: 85
End: 2025-05-01

## 2025-05-01 ENCOUNTER — APPOINTMENT (OUTPATIENT)
Dept: PULMONOLOGY | Facility: CLINIC | Age: 85
End: 2025-05-01
Payer: MEDICARE

## 2025-05-01 PROCEDURE — 94729 DIFFUSING CAPACITY: CPT

## 2025-05-01 PROCEDURE — 94726 PLETHYSMOGRAPHY LUNG VOLUMES: CPT

## 2025-05-01 PROCEDURE — 94010 BREATHING CAPACITY TEST: CPT

## 2025-05-01 NOTE — PROGRESS NOTE ADULT - ATTENDING COMMENTS
Subjective:       Patient ID: Farzad Mckenzie is a 77 y.o. male     Chief Complaint:    Chief Complaint   Patient presents with    Follow-up        Allergies:  Patient has no known allergies.    Current Medications:    Outpatient Encounter Medications as of 5/1/2025   Medication Sig Dispense Refill    aspirin (ECOTRIN) 81 MG EC tablet Take 81 mg by mouth once daily.      atorvastatin (LIPITOR) 80 MG tablet Take 80 mg by mouth once daily.      cholecalciferol, vitamin D3, 10 mcg (400 unit) Cap capsule Take 400 Units by mouth once daily.      colchicine, gout, (COLCRYS) 0.6 mg tablet 1.2 mg.      primidone (MYSOLINE) 50 MG Tab Take 50 mg by mouth once daily.      [DISCONTINUED] carBAMazepine (TEGRETOL XR) 100 MG 12 hr tablet Take 1 tablet (100 mg total) by mouth 2 (two) times daily. 180 tablet 1    carBAMazepine (TEGRETOL XR) 100 MG 12 hr tablet Take 1 tablet (100 mg total) by mouth 2 (two) times daily. 180 tablet 3    pantoprazole (PROTONIX) 40 MG tablet Take 1 tablet by mouth once daily.      terazosin (HYTRIN) 5 MG capsule Take 1 capsule by mouth once daily.      terazosin (HYTRIN) 5 MG capsule Take 1 capsule by mouth once daily. (Patient not taking: Reported on 5/1/2024)      [DISCONTINUED] atenoloL (TENORMIN) 50 MG tablet Take 1 tablet by mouth once daily.      [DISCONTINUED] atenoloL (TENORMIN) 50 MG tablet Take 1 tablet by mouth once daily. (Patient not taking: Reported on 5/1/2024)      [DISCONTINUED] fluticasone propionate (FLONASE) 50 mcg/actuation nasal spray 2 sprays (100 mcg total) by Each Nostril route once daily. 16 g 0    [DISCONTINUED] pantoprazole (PROTONIX) 40 MG tablet Take 1 tablet by mouth once daily. (Patient not taking: Reported on 5/1/2024)       No facility-administered encounter medications on file as of 5/1/2025.       History of Present Illness  78 y/o male following in neurology for trigeminal neuralgia.    Prescribed tegretol 100mg bid which he actually varies dosing depending on  his symptoms.  Taking QOD when symptoms are settled down, and BID when acute flair.  Onset about age of 40 years, typically on right side of face.  No clear triggers identified.  Last episode was April of 2022.  He has been doing well until about 4/13/25 when it seemed to worsen slightly, he started 1 tablet daily and this has worked well.    Prior MRI brain 11/21/22 was negative.  MRA of brain 11/21/22 also negative.           Review of Systems  Review of Systems   Constitutional:  Negative for diaphoresis and fever.   HENT:  Negative for congestion, hearing loss and tinnitus.    Eyes:  Negative for blurred vision, double vision, photophobia, discharge and redness.   Respiratory:  Negative for cough and shortness of breath.    Cardiovascular:  Negative for chest pain.   Gastrointestinal:  Negative for abdominal pain, nausea and vomiting.   Musculoskeletal:  Negative for back pain, joint pain, myalgias and neck pain.   Skin:  Negative for itching and rash.   Neurological:  Positive for tremors and headaches. Negative for dizziness, sensory change, speech change, focal weakness, seizures, loss of consciousness and weakness.   Psychiatric/Behavioral:  Negative for depression, hallucinations and memory loss. The patient does not have insomnia.    All other systems reviewed and are negative.     Objective:     NEUROLOGICAL EXAMINATION:     MENTAL STATUS   Oriented to person, place, and time.   Registration: recalls 3 of 3 objects. Recall at 5 minutes: recalls 3 of 3 objects.   Attention: normal. Concentration: normal.   Speech: speech is normal   Level of consciousness: alert  Knowledge: good and consistent with education.   Normal comprehension.     CRANIAL NERVES     CN II   Visual fields full to confrontation.   Visual acuity: normal  Right visual field deficit: none  Left visual field deficit: none     CN III, IV, VI   Pupils are equal, round, and reactive to light.  Extraocular motions are normal.   Right pupil:  Size: 3 mm. Shape: regular. Reactivity: brisk. Consensual response: intact. Accommodation: intact.   Left pupil: Size: 3 mm. Shape: regular. Reactivity: brisk. Consensual response: intact. Accommodation: intact.   CN III: no CN III palsy  CN VI: no CN VI palsy  Nystagmus: none   Diplopia: none  Upgaze: normal  Downgaze: normal  Conjugate gaze: present  Vestibulo-ocular reflex: present    CN V   Facial sensation intact.   Right facial sensation deficit: none  Left facial sensation deficit: none  Right corneal reflex: normal  Left corneal reflex: normal    CN VII   Facial expression full, symmetric.   Right facial weakness: none  Left facial weakness: none  Right taste: normal  Left taste: normal    CN VIII   CN VIII normal.   Hearing: intact    CN IX, X   CN IX normal.   CN X normal.   Palate: symmetric    CN XI   CN XI normal.   Right sternocleidomastoid strength: normal  Left sternocleidomastoid strength: normal  Right trapezius strength: normal  Left trapezius strength: normal    CN XII   CN XII normal.   Tongue: not atrophic  Fasciculations: absent  Tongue deviation: none    MOTOR EXAM   Muscle bulk: normal  Overall muscle tone: normal  Right arm tone: normal  Left arm tone: normal  Right arm pronator drift: absent  Left arm pronator drift: absent  Right leg tone: normal  Left leg tone: normal    Strength   Right neck flexion: 5/5  Left neck flexion: 5/5  Right neck extension: 5/5  Left neck extension: 5/5  Right deltoid: 5/5  Left deltoid: 5/5  Right biceps: 5/5  Left biceps: 5/5  Right triceps: 5/5  Left triceps: 5/5  Right wrist flexion: 5/5  Left wrist flexion: 5/5  Right wrist extension: 5/5  Left wrist extension: 5/5  Right interossei: 5/5  Left interossei: 5/5  Right iliopsoas: 5/5  Left iliopsoas: 5/5  Right quadriceps: 5/5  Left quadriceps: 5/5  Right hamstrin/5  Left hamstrin/5  Right anterior tibial: 5/5  Left anterior tibial: 5/5  Right posterior tibial: 5/5  Left posterior tibial: 5/5  Right  gastroc: 5/5  Left gastroc: 5/5    REFLEXES     Reflexes   Right brachioradialis: 2+  Left brachioradialis: 2+  Right biceps: 2+  Left biceps: 2+  Right triceps: 2+  Left triceps: 2+  Right patellar: 2+  Left patellar: 2+  Right achilles: 2+  Left achilles: 2+  Right plantar: normal  Left plantar: normal  Right Gray: absent  Left Gray: absent  Right ankle clonus: absent  Left ankle clonus: absent  Right pendular knee jerk: absent  Left pendular knee jerk: absent    SENSORY EXAM   Light touch normal.   Right arm light touch: normal  Left arm light touch: normal  Right leg light touch: normal  Left leg light touch: normal  Vibration normal.   Right arm vibration: normal  Left arm vibration: normal  Right leg vibration: normal  Left leg vibration: normal  Proprioception normal.   Right arm proprioception: normal  Left arm proprioception: normal  Right leg proprioception: normal  Left leg proprioception: normal  Pinprick normal.   Right arm pinprick: normal  Left arm pinprick: normal  Right leg pinprick: normal  Left leg pinprick: normal  Graphesthesia: normal  Romberg: negative  Stereognosis: normal    GAIT AND COORDINATION     Gait  Gait: normal     Coordination   Finger to nose coordination: normal  Heel to shin coordination: normal  Tandem walking coordination: normal    Tremor   Resting tremor: absent  Intention tremor: absent  Action tremor: absent       Physical Exam  Vitals and nursing note reviewed.   Constitutional:       Appearance: Normal appearance.   HENT:      Head: Normocephalic.   Eyes:      Extraocular Movements: EOM normal.      Pupils: Pupils are equal, round, and reactive to light.   Cardiovascular:      Rate and Rhythm: Normal rate and regular rhythm.   Pulmonary:      Effort: Pulmonary effort is normal.   Musculoskeletal:         General: Normal range of motion.      Cervical back: Normal range of motion and neck supple.   Skin:     General: Skin is warm and dry.   Neurological:       General: No focal deficit present.      Mental Status: He is alert and oriented to person, place, and time.      Cranial Nerves: No cranial nerve deficit.      Sensory: No sensory deficit.      Motor: No weakness.      Coordination: Coordination normal. Finger-Nose-Finger Test, Heel to Shin Test and Romberg Test normal.      Gait: Gait is intact. Gait and tandem walk normal.      Deep Tendon Reflexes: Reflexes normal.      Reflex Scores:       Tricep reflexes are 2+ on the right side and 2+ on the left side.       Bicep reflexes are 2+ on the right side and 2+ on the left side.       Brachioradialis reflexes are 2+ on the right side and 2+ on the left side.       Patellar reflexes are 2+ on the right side and 2+ on the left side.       Achilles reflexes are 2+ on the right side and 2+ on the left side.  Psychiatric:         Mood and Affect: Mood normal.         Speech: Speech normal.         Behavior: Behavior normal.          Assessment:     Problem List Items Addressed This Visit          Neuro    Trigeminal neuralgia - Primary    Relevant Medications    carBAMazepine (TEGRETOL XR) 100 MG 12 hr tablet                Primary Diagnosis and ICD10  Trigeminal neuralgia [G50.0]    Plan:     There are no Patient Instructions on file for this visit.    Medications Discontinued During This Encounter   Medication Reason    carBAMazepine (TEGRETOL XR) 100 MG 12 hr tablet     atenoloL (TENORMIN) 50 MG tablet     atenoloL (TENORMIN) 50 MG tablet     fluticasone propionate (FLONASE) 50 mcg/actuation nasal spray     pantoprazole (PROTONIX) 40 MG tablet            Requested Prescriptions     Signed Prescriptions Disp Refills    carBAMazepine (TEGRETOL XR) 100 MG 12 hr tablet 180 tablet 3     Sig: Take 1 tablet (100 mg total) by mouth 2 (two) times daily.       No orders of the defined types were placed in this encounter.             Data reviewed, patient seen/examined and care plan reviewed/updated with fellow. Care plan coordinated with NSGY.    Glycemic control: d/c Lantus, increase NPH

## 2025-05-22 ENCOUNTER — DOCTOR'S OFFICE (OUTPATIENT)
Facility: LOCATION | Age: 85
Setting detail: OPHTHALMOLOGY
End: 2025-05-22
Payer: MEDICARE

## 2025-05-22 DIAGNOSIS — E11.3512: ICD-10-CM

## 2025-05-22 DIAGNOSIS — E11.3511: ICD-10-CM

## 2025-05-22 DIAGNOSIS — H35.3124: ICD-10-CM

## 2025-05-22 DIAGNOSIS — H43.813: ICD-10-CM

## 2025-05-22 DIAGNOSIS — H35.3114: ICD-10-CM

## 2025-05-22 DIAGNOSIS — H35.373: ICD-10-CM

## 2025-05-22 PROCEDURE — 99214 OFFICE O/P EST MOD 30 MIN: CPT | Performed by: OPHTHALMOLOGY

## 2025-05-22 PROCEDURE — 92235 FLUORESCEIN ANGRPH MLTIFRAME: CPT | Performed by: OPHTHALMOLOGY

## 2025-05-22 PROCEDURE — 92134 CPTRZ OPH DX IMG PST SGM RTA: CPT | Performed by: OPHTHALMOLOGY

## 2025-05-22 ASSESSMENT — LID EXAM ASSESSMENTS
OD_MEIBOMITIS: RLL 1+
OD_BLEPHARITIS: RUL T
OS_MEIBOMITIS: LLL 1+
OS_BLEPHARITIS: LUL T

## 2025-05-22 ASSESSMENT — KERATOMETRY
OS_K1POWER_DIOPTERS: 42.25
OD_AXISANGLE_DEGREES: 163
OD_K2POWER_DIOPTERS: 43.50
OS_K2POWER_DIOPTERS: 43.50
OD_K1POWER_DIOPTERS: 40.25
OS_AXISANGLE_DEGREES: 161

## 2025-05-22 ASSESSMENT — REFRACTION_AUTOREFRACTION
OD_CYLINDER: +1.00
OS_SPHERE: -1.00
OD_AXIS: 143
OS_AXIS: 154
OD_SPHERE: -0.25
OS_CYLINDER: +1.00

## 2025-05-22 ASSESSMENT — VISUAL ACUITY
OD_BCVA: 20/25-3+1
OS_BCVA: 20/20-1

## 2025-06-03 ENCOUNTER — EMERGENCY (EMERGENCY)
Facility: HOSPITAL | Age: 85
LOS: 1 days | End: 2025-06-03
Attending: EMERGENCY MEDICINE
Payer: MEDICARE

## 2025-06-03 VITALS
RESPIRATION RATE: 18 BRPM | SYSTOLIC BLOOD PRESSURE: 139 MMHG | DIASTOLIC BLOOD PRESSURE: 62 MMHG | TEMPERATURE: 98 F | HEART RATE: 78 BPM | OXYGEN SATURATION: 97 %

## 2025-06-03 VITALS
RESPIRATION RATE: 20 BRPM | OXYGEN SATURATION: 96 % | TEMPERATURE: 98 F | HEART RATE: 83 BPM | SYSTOLIC BLOOD PRESSURE: 116 MMHG | HEIGHT: 65 IN | DIASTOLIC BLOOD PRESSURE: 79 MMHG | WEIGHT: 151.02 LBS

## 2025-06-03 DIAGNOSIS — I25.10 ATHEROSCLEROTIC HEART DISEASE OF NATIVE CORONARY ARTERY WITHOUT ANGINA PECTORIS: Chronic | ICD-10-CM

## 2025-06-03 DIAGNOSIS — I73.9 PERIPHERAL VASCULAR DISEASE, UNSPECIFIED: Chronic | ICD-10-CM

## 2025-06-03 PROCEDURE — 99284 EMERGENCY DEPT VISIT MOD MDM: CPT | Mod: 25

## 2025-06-03 PROCEDURE — 73030 X-RAY EXAM OF SHOULDER: CPT

## 2025-06-03 PROCEDURE — 73562 X-RAY EXAM OF KNEE 3: CPT | Mod: 26,RT

## 2025-06-03 PROCEDURE — 73110 X-RAY EXAM OF WRIST: CPT

## 2025-06-03 PROCEDURE — 99284 EMERGENCY DEPT VISIT MOD MDM: CPT | Mod: FS

## 2025-06-03 PROCEDURE — 73030 X-RAY EXAM OF SHOULDER: CPT | Mod: 26,RT

## 2025-06-03 PROCEDURE — 73590 X-RAY EXAM OF LOWER LEG: CPT

## 2025-06-03 PROCEDURE — 73630 X-RAY EXAM OF FOOT: CPT | Mod: 26,RT

## 2025-06-03 PROCEDURE — 73630 X-RAY EXAM OF FOOT: CPT

## 2025-06-03 PROCEDURE — 73110 X-RAY EXAM OF WRIST: CPT | Mod: 26,RT

## 2025-06-03 PROCEDURE — 73590 X-RAY EXAM OF LOWER LEG: CPT | Mod: 26,RT

## 2025-06-03 PROCEDURE — 73562 X-RAY EXAM OF KNEE 3: CPT

## 2025-06-03 NOTE — ED PROVIDER NOTE - PATIENT PORTAL LINK FT
You can access the FollowMyHealth Patient Portal offered by [facility] by registering at the following website: [email or web address]. By joining [facility]’s FollowMyHealth portal, you will also be able to view your health information using other applications (apps) compatible with our system.

## 2025-06-03 NOTE — ED PROVIDER NOTE - NSFOLLOWUPINSTRUCTIONS_ED_ALL_ED_FT
1. Follow up with your PCP within 2-3 days.   2. Continue home medications. Take pain medication at home as directed.   3. Rest. Ice. Elevate.   4. Return to the emergency department if you have worsening pain, difficulty ambulating, redness, fevers, swelling or all other concerns.

## 2025-06-03 NOTE — ED PROVIDER NOTE - ATTENDING APP SHARED VISIT CONTRIBUTION OF CARE
Traveling alone I performed a history and physical exam of the patient and discussed their management with the resident and /or advanced care provider. I reviewed the resident and /or ACP's note and agree with the documented findings and plan of care. My medical decision making and observations are found above.  Lungs clear, abd soft, pain and warmth in rt foot.

## 2025-06-03 NOTE — ED ADULT NURSE NOTE - FINAL NURSING ELECTRONIC SIGNATURE
I have reviewed the surgical (or preoperative) H&P that is linked to this encounter, and examined the patient. There are no significant changes    Clinical Conditions Present on Arrival:  Clinically Significant Risk Factors Present on Admission                        03-Jun-2025 14:25

## 2025-06-03 NOTE — ED ADULT NURSE NOTE - OBJECTIVE STATEMENT
86y/o female presents to ER from home with c/o fall. Pt had a fall a few days ago in bedroom. No LOC or head hit. Pt takes Eliquis. Endorsing right leg pain and difficulty walking. Denies SOB, CP, n/v/d, abd pain. A&Ox3, airway patent. Moving all extremities without difficulty.

## 2025-06-03 NOTE — ED PROVIDER NOTE - CLINICAL SUMMARY MEDICAL DECISION MAKING FREE TEXT BOX
Nitish: Patient is an 85-year-old who fell at home.  Patient since the fall has had pain whenever she tries to walk on her right leg.  Patient with a pre-existing neuropathy in her lower extremities.  Patient also with previous injury to her right shoulder which is also tender now and she is found that since the fall her right wrist also is sore with motion.  No obvious swelling to these areas some warmth in the right foot.  Will x-ray the affected areas will treat her pain.  If everything is negative would consider this muscular or because of the warm right foot gout.

## 2025-06-03 NOTE — ED PROVIDER NOTE - OBJECTIVE STATEMENT
86 yo F w/ PMH of HTN, HLD, DM2, CAD c/b MI, Afib on Eliquis, PAD, metastatic lung CA w/ known brain mets s/p craniectomy previously on Keytruda Presents to the emergency department complaining of pain to right foot since yesterday.  Patient states that yesterday was in the kitchen and her legs gave out she fell to the floor landed on the right side.  Her aide was in the other room and came in right away and helped her up.  Patient initially thought she was okay however today developed worsening pain to the right foot which radiates up her right leg.  She is having difficulty walking today with her walker.  Denies any head trauma or LOC.  Denies any headache, fever, chills, chest pain, vomiting.

## 2025-06-03 NOTE — ED PROVIDER NOTE - PHYSICAL EXAMINATION
CONSTITUTIONAL: Patient is awake, alert and oriented x 3.   HEAD: NCAT  EYES: PERRL bilaterally,  ENT: Airway patent, Nasal mucosa clear  NECK: Supple,   LUNGS: CTA B/L,   HEART: RRR.+S1S2   ABDOMEN: Soft, non-tender to palpation throughout all four quadrants  MSK: mild ttp to dorsum of right foot, no bony ttp to right hip, knee or ankle, FROM upper and lower ext b/l, distal pulses intact;   SKIN: mild erythema edema and warmth to dorsum of right foot;   NEURO: No focal deficits

## 2025-06-03 NOTE — ED ADULT NURSE NOTE - NSFALLUNIVINTERV_ED_ALL_ED
Bed/Stretcher in lowest position, wheels locked, appropriate side rails in place/Call bell, personal items and telephone in reach/Instruct patient to call for assistance before getting out of bed/chair/stretcher/Non-slip footwear applied when patient is off stretcher/[place] to call system/Physically safe environment - no spills, clutter or unnecessary equipment/Purposeful proactive rounding/Room/bathroom lighting operational, light cord in reach

## 2025-06-09 ENCOUNTER — OUTPATIENT (OUTPATIENT)
Dept: OUTPATIENT SERVICES | Facility: HOSPITAL | Age: 85
LOS: 1 days | End: 2025-06-09
Payer: MEDICARE

## 2025-06-09 ENCOUNTER — APPOINTMENT (OUTPATIENT)
Dept: MRI IMAGING | Facility: IMAGING CENTER | Age: 85
End: 2025-06-09
Payer: MEDICARE

## 2025-06-09 ENCOUNTER — APPOINTMENT (OUTPATIENT)
Dept: NEUROLOGY | Facility: CLINIC | Age: 85
End: 2025-06-09
Payer: MEDICARE

## 2025-06-09 ENCOUNTER — NON-APPOINTMENT (OUTPATIENT)
Age: 85
End: 2025-06-09

## 2025-06-09 VITALS
RESPIRATION RATE: 16 BRPM | BODY MASS INDEX: 29.26 KG/M2 | TEMPERATURE: 88.4 F | OXYGEN SATURATION: 98 % | HEART RATE: 74 BPM | DIASTOLIC BLOOD PRESSURE: 78 MMHG | SYSTOLIC BLOOD PRESSURE: 122 MMHG | HEIGHT: 62 IN | WEIGHT: 159 LBS

## 2025-06-09 DIAGNOSIS — C71.9 MALIGNANT NEOPLASM OF BRAIN, UNSPECIFIED: ICD-10-CM

## 2025-06-09 DIAGNOSIS — I25.10 ATHEROSCLEROTIC HEART DISEASE OF NATIVE CORONARY ARTERY WITHOUT ANGINA PECTORIS: Chronic | ICD-10-CM

## 2025-06-09 DIAGNOSIS — I73.9 PERIPHERAL VASCULAR DISEASE, UNSPECIFIED: Chronic | ICD-10-CM

## 2025-06-09 PROCEDURE — 70553 MRI BRAIN STEM W/O & W/DYE: CPT | Mod: 26

## 2025-06-09 PROCEDURE — 70553 MRI BRAIN STEM W/O & W/DYE: CPT

## 2025-06-09 PROCEDURE — 99214 OFFICE O/P EST MOD 30 MIN: CPT

## 2025-07-11 NOTE — ED CLERICAL - DIVISION
[facility]... DM2 (diabetes mellitus, type 2) DM2 (diabetes mellitus, type 2) DM2 (diabetes mellitus, type 2) DM2 (diabetes mellitus, type 2) DM2 (diabetes mellitus, type 2) DM2 (diabetes mellitus, type 2) DM2 (diabetes mellitus, type 2)

## 2025-07-31 NOTE — DISCHARGE NOTE PROVIDER - DISCHARGE SERVICE FOR PATIENT
Today :We administered denosumab.     For:   1. Age-related osteoporosis without current pathological fracture          (Tell all doctors including dentists that you are taking this medication)     Go to the emergency room or call 911 if:  -You have signs of allergic reaction:   -Rash, hives, itching.   -Swollen, blistered, peeling skin.   -Swelling of face, lips, mouth, tongue or throat.   -Tightness of chest, trouble breathing, swallowing or talking     Call your doctor:  - If IV / injection site gets red, warm, swollen, itchy or leaks fluid or pus.     (Leave dressing on your IV site for at least 2 hours and keep area clean and dry  - If you get sick or have symptoms of infection or are not feeling well for any reason.    (Wash your hands often, stay away from people who are sick)  - If you have side effects from your medication that do not go away or are bothersome.     (Refer to the teaching your nurse gave you for side effects to call your doctor about)    - Common side effects may include:  stuffy nose, headache, feeling tired, muscle aches, upset stomach  - Before receiving any vaccines     - Call the [facility] at   If:  - You get sick, are on antibiotics, have had a recent vaccine, have surgery or dental work and your doctor wants your visit rescheduled.  - You need to cancel and reschedule your visit for any reason. Call at least 2 days before your visit if you need to cancel.   - Your insurance changes before your next visit.    (We will need to get approval from your new insurance. This can take up to two weeks.)     The [facility] is opened Monday thru Friday. We are closed on weekends and holidays.   Voice mail will take your call if the center is closed. If you leave a message please allow 24 hours for a call back during weekdays. If you leave a message on a weekend/holiday, we will call you back the next business day.    A pharmacist is available Monday - Friday from 8:30AM to  3:30PM to help answer any questions you may have about your prescriptions(s). Please call pharmacy at:    [facility]: [phone]  [facility]: [phone]  [facility]: [phone]               on the discharge service for the patient. I have reviewed and made amendments to the documentation where necessary.

## 2025-08-04 ENCOUNTER — NON-APPOINTMENT (OUTPATIENT)
Age: 85
End: 2025-08-04

## 2025-08-21 ENCOUNTER — OUTPATIENT (OUTPATIENT)
Dept: OUTPATIENT SERVICES | Facility: HOSPITAL | Age: 85
LOS: 1 days | End: 2025-08-21
Payer: MEDICARE

## 2025-08-21 ENCOUNTER — APPOINTMENT (OUTPATIENT)
Dept: NUCLEAR MEDICINE | Facility: IMAGING CENTER | Age: 85
End: 2025-08-21
Payer: MEDICARE

## 2025-08-21 DIAGNOSIS — I25.10 ATHEROSCLEROTIC HEART DISEASE OF NATIVE CORONARY ARTERY WITHOUT ANGINA PECTORIS: Chronic | ICD-10-CM

## 2025-08-21 DIAGNOSIS — I73.9 PERIPHERAL VASCULAR DISEASE, UNSPECIFIED: Chronic | ICD-10-CM

## 2025-08-21 DIAGNOSIS — C34.12 MALIGNANT NEOPLASM OF UPPER LOBE, LEFT BRONCHUS OR LUNG: ICD-10-CM

## 2025-08-21 PROCEDURE — 78815 PET IMAGE W/CT SKULL-THIGH: CPT

## 2025-08-21 PROCEDURE — 78815 PET IMAGE W/CT SKULL-THIGH: CPT | Mod: 26,PS

## 2025-08-21 PROCEDURE — A9552: CPT

## 2025-08-26 ENCOUNTER — APPOINTMENT (OUTPATIENT)
Dept: HEMATOLOGY ONCOLOGY | Facility: CLINIC | Age: 85
End: 2025-08-26
Payer: MEDICARE

## 2025-08-26 ENCOUNTER — APPOINTMENT (OUTPATIENT)
Dept: HEMATOLOGY ONCOLOGY | Facility: CLINIC | Age: 85
End: 2025-08-26

## 2025-08-26 DIAGNOSIS — C79.31 SECONDARY MALIGNANT NEOPLASM OF BRAIN: ICD-10-CM

## 2025-08-26 DIAGNOSIS — C34.12 MALIGNANT NEOPLASM OF UPPER LOBE, LEFT BRONCHUS OR LUNG: ICD-10-CM

## 2025-08-26 PROCEDURE — 99213 OFFICE O/P EST LOW 20 MIN: CPT | Mod: 2W

## 2025-08-26 PROCEDURE — G2211 COMPLEX E/M VISIT ADD ON: CPT | Mod: 2W

## (undated) DEVICE — DRAPE VARI-LENS2 MICROSCPOPE 68MM

## (undated) DEVICE — CANISTER SUCTION 2000CC

## (undated) DEVICE — ELCTR BIPOLAR CORD IRRIGATION AESCULAP DISP

## (undated) DEVICE — VISITEC 4X4

## (undated) DEVICE — GLV 8.5 PROTEXIS (WHITE)

## (undated) DEVICE — GLV 7.5 PROTEXIS (WHITE)

## (undated) DEVICE — DRAPE TOWEL BLUE 17" X 24"

## (undated) DEVICE — DRSG TELFA 3 X 8

## (undated) DEVICE — STAPLER SKIN VISI-STAT 35 WIDE

## (undated) DEVICE — TRAP SPECIMEN SPUTUM 40CC

## (undated) DEVICE — GLV 7 PROTEXIS (WHITE)

## (undated) DEVICE — DRAPE CAMERA VIDEO 7"X96"

## (undated) DEVICE — BALLOON SINGLE FOR BF-UC160F

## (undated) DEVICE — VALVE BIOPSY BRONCHOVIDEOSCOPE

## (undated) DEVICE — BIPOLAR FORCEP CODMAN VERSATRU 8" X 0.5MM (BLUE)

## (undated) DEVICE — TAPE SILK 3"

## (undated) DEVICE — SOL INJ NS 0.9% 500ML 1-PORT

## (undated) DEVICE — Device

## (undated) DEVICE — VENODYNE/SCD SLEEVE CALF LARGE

## (undated) DEVICE — SYR LUER LOK 50CC

## (undated) DEVICE — ELCTR BOVIE TIP BLADE INSULATED 2.75" EDGE

## (undated) DEVICE — LAP PAD 18 X 18"

## (undated) DEVICE — ELCTR SUBDERMAL NDL CLASSIC 1.5M X 59" (6 COLOR)

## (undated) DEVICE — SUT SOFSILK 4-0 18" CV-23

## (undated) DEVICE — PREP CHLORAPREP HI-LITE ORANGE 26ML

## (undated) DEVICE — FILTERLINE NASAL O2 CO2 ADLT

## (undated) DEVICE — BRUSH CYTO ENDO

## (undated) DEVICE — VAGINAL PACKING 2 X 6"

## (undated) DEVICE — SUCTION CATH ARGYLE WHISTLE TIP 14FR STRAIGHT PACKED

## (undated) DEVICE — GOWN TRIMAX LG

## (undated) DEVICE — DRAPE MAYO STAND 30"

## (undated) DEVICE — NDL ASPIRATION 22G W SYR

## (undated) DEVICE — SOL IRR POUR H2O 250ML

## (undated) DEVICE — ELCTR GROUNDING PAD ADULT COVIDIEN

## (undated) DEVICE — ELCTR BIPOLAR PROBE

## (undated) DEVICE — FORCEP BIOPSY BRONCHOSCOPE DISP

## (undated) DEVICE — TUBING SUCTION CONN 6FT STERILE

## (undated) DEVICE — SUT VICRYL 3-0 18" X-1 (POP-OFF)

## (undated) DEVICE — POSITIONER FOAM EGG CRATE ULNAR 2PCS (PINK)

## (undated) DEVICE — SYR LUER LOK 10CC

## (undated) DEVICE — BLADE SCALPEL SAFETYLOCK #15

## (undated) DEVICE — SYR LUER LOK 20CC

## (undated) DEVICE — NDL ASPIRATION 21G

## (undated) DEVICE — ELCTR 4-DISC 20MM 49" (RED, BLUE, GREEN, BLACK)

## (undated) DEVICE — DRAPE SURGICAL #1010

## (undated) DEVICE — FILTERLINE ET TUBE PED/ADLT ETCO2

## (undated) DEVICE — GLV 6.5 PROTEXIS (WHITE)

## (undated) DEVICE — MIDAS REX LEGEND TAPERED SM BORE 2.3MM X 8CM

## (undated) DEVICE — FOLEY HOLDER STATLOCK 2 WAY ADULT

## (undated) DEVICE — LONE STAR ELASTIC STAY HOOK 5MM SHARP

## (undated) DEVICE — MASK O2 NON REBREATH 3IN1 ADULT

## (undated) DEVICE — DISSECTING TOOL MIDAS REX 6MM BALL FLUTED

## (undated) DEVICE — SOL IRR POUR NS 0.9% 500ML

## (undated) DEVICE — AESCULAP SCALPFIX 10 CLIPS

## (undated) DEVICE — MIDAS REX LEGEND LUBRICANT DIFFUSER CARTRIDGE

## (undated) DEVICE — ELCTR MONOPOLAR STIMULATOR PROBE FLUSH-TIP

## (undated) DEVICE — SNAP ON SPHERZ 5 PACK

## (undated) DEVICE — DRAPE MICROSCOPE OPMI VISIONGUARD 48X118"

## (undated) DEVICE — CODMAN PERFORATOR 14MM (BLUE)

## (undated) DEVICE — SPECIMEN CONTAINER 100ML

## (undated) DEVICE — STRYKER SONOPET IQ TUBING SET

## (undated) DEVICE — SPHERE MARKER (5 SPHERES)

## (undated) DEVICE — MEDICATION LABELS W MARKER

## (undated) DEVICE — ELCTR PEDICLE SCREW PROBE 3MM BALL 1.8MM X 100MM

## (undated) DEVICE — SUCTION YANKAUER NO CONTROL VENT

## (undated) DEVICE — BLADE SCALPEL SAFETYLOCK #10

## (undated) DEVICE — DRSG KLING 4"

## (undated) DEVICE — NDL HYPO SAFE 18G X 1.5" (PINK)

## (undated) DEVICE — DRSG XEROFORM 1 X 8"

## (undated) DEVICE — ELCTR SUBDERMAL NDL 27G X 1/2" WITH TWISTED PAIR

## (undated) DEVICE — DRSG CURITY GAUZE SPONGE 4 X 4" 12-PLY

## (undated) DEVICE — MARKING PEN W RULER

## (undated) DEVICE — FORCEP RADIAL JAW 4 PEDIATRIC W NDL 1.8MM 2MM 160CM DISP

## (undated) DEVICE — TUBING SUCTION 20FT

## (undated) DEVICE — DRAPE LIGHT HANDLE COVER (BLUE)

## (undated) DEVICE — DRAIN PLEUROVAC

## (undated) DEVICE — DRAPE 1/2 SHEET 40X57"

## (undated) DEVICE — GLV 8 PROTEXIS (WHITE)

## (undated) DEVICE — SOL INJ NS 0.9% 250ML

## (undated) DEVICE — FOLEY TRAY 16FR 5CC LTX UMETER CLOSED

## (undated) DEVICE — ELCTR SUBDERMAL CORKSCREW NDL 1.2MM

## (undated) DEVICE — DRAPE 3/4 SHEET W REINFORCEMENT 56X77"

## (undated) DEVICE — SYR LUER LOK 3CC

## (undated) DEVICE — VALVE SUCTION EVIS 160/200/240

## (undated) DEVICE — WARMING BLANKET FULL ADULT